# Patient Record
Sex: FEMALE | Race: ASIAN | NOT HISPANIC OR LATINO
[De-identification: names, ages, dates, MRNs, and addresses within clinical notes are randomized per-mention and may not be internally consistent; named-entity substitution may affect disease eponyms.]

---

## 2022-07-13 ENCOUNTER — APPOINTMENT (OUTPATIENT)
Dept: UROLOGY | Facility: CLINIC | Age: 57
End: 2022-07-13

## 2022-07-13 PROBLEM — Z00.00 ENCOUNTER FOR PREVENTIVE HEALTH EXAMINATION: Status: ACTIVE | Noted: 2022-07-13

## 2022-08-17 ENCOUNTER — APPOINTMENT (OUTPATIENT)
Dept: UROLOGY | Facility: CLINIC | Age: 57
End: 2022-08-17

## 2022-09-27 ENCOUNTER — INPATIENT (INPATIENT)
Facility: HOSPITAL | Age: 57
LOS: 9 days | Discharge: HOME | End: 2022-10-07
Attending: STUDENT IN AN ORGANIZED HEALTH CARE EDUCATION/TRAINING PROGRAM | Admitting: STUDENT IN AN ORGANIZED HEALTH CARE EDUCATION/TRAINING PROGRAM

## 2022-09-27 VITALS
RESPIRATION RATE: 15 BRPM | TEMPERATURE: 98 F | DIASTOLIC BLOOD PRESSURE: 154 MMHG | OXYGEN SATURATION: 100 % | SYSTOLIC BLOOD PRESSURE: 183 MMHG | HEART RATE: 67 BPM

## 2022-09-27 PROCEDURE — 93010 ELECTROCARDIOGRAM REPORT: CPT

## 2022-09-27 PROCEDURE — 99291 CRITICAL CARE FIRST HOUR: CPT | Mod: 25

## 2022-09-27 NOTE — ED PROVIDER NOTE - NSTIMEPROVIDERCAREINITIATE_GEN_ER
Instructions: This plan will send the code FBSE to the PM system.  DO NOT or CHANGE the price.
Detail Level: Simple
Price (Do Not Change): 0.00
27-Sep-2022 23:22

## 2022-09-27 NOTE — ED PROVIDER NOTE - PROGRESS NOTE DETAILS
Neptali PGY2: pt tolerating being off BIPAP, much more comfortable, talking full sentences. Off O2 altogether. Agreeable to admit.

## 2022-09-27 NOTE — ED ADULT TRIAGE NOTE - CHIEF COMPLAINT QUOTE
presents C/O SOB/ CP. No complaints of chest pain, headache, nausea, dizziness, vomiting fever, chills verbalized.. Pmhx HTN. anemia. DM

## 2022-09-27 NOTE — ED PROVIDER NOTE - CLINICAL SUMMARY MEDICAL DECISION MAKING FREE TEXT BOX
Neptali PGY2: 56yo F with PMH of HTN HLD DM2 presents to ED for eval of SOB with tachypnea and inc WOB with crackles in bases and HTN. Likely again in HTN emergency, no active CP. Plan for labs, BIPAP, EKG, CXR and plan for further tx based on results. Likely admit.

## 2022-09-27 NOTE — ED PROVIDER NOTE - NS ED ROS FT
Constitutional:  See HPI, +chills, afebrile  ENMT: No neck pain or stiffness  Cardiac:  No chest pain  Respiratory:  ++ respiratory distress. no cough  GI:  No nausea, vomiting, diarrhea or abdominal pain.  MS:  No back pain. no LE edema  Neuro:  No headache   Except as documented in the HPI,  all other systems are negative

## 2022-09-27 NOTE — ED PROVIDER NOTE - PHYSICAL EXAMINATION
CONSTITUTIONAL: mod respiratory distress   SKIN: Warm dry  HEAD: NCAT  EYES: NL inspection  ENT: dry oral mucosa  NECK: Supple; non tender.  CARD: RRR  RESP: b/l lower base crackles, tachypnea, inc WOB, accessory muscle use   ABD: S/NT no R/G  EXT: trace foot b/l pedal edema  NEURO: Grossly unremarkable  PSYCH: Cooperative, appropriate.

## 2022-09-27 NOTE — ED PROVIDER NOTE - CARE PLAN
Principal Discharge DX:	SOB (shortness of breath)   1 Principal Discharge DX:	Hypertensive emergency  Secondary Diagnosis:	SOB (shortness of breath)  Secondary Diagnosis:	ISAMAR (acute kidney injury)  Secondary Diagnosis:	Hyperkalemia

## 2022-09-27 NOTE — ED PROVIDER NOTE - ATTENDING CONTRIBUTION TO CARE
58 yo F with PMH HTN DM HLD Presenting with one day of progressive SOB that is now constant and severe.  Contrary to the triage note there is no CP reported.  Was recently seen and dc from [facility] for HTN emergency with some pleural effusions.  Symptoms had improved since dc but returned today.  Has also had some associated chills.      Vitals: I have reviewed the patients vital signs  General: nontoxic appearing severely SOB with two word sentences   HEENT: Atraumatic, normocephalic, airway patent  Eyes: EOMI, tracking appropriately  Neck: no tracheal deviation  Chest/Lungs: no trauma, symmetric chest rise, speaking in 2 word sentences,  clear resp distress with fine crackles in the bases  Heart: skin and extremities well perfused, regular rate and rhythm  Neuro: A+Ox3, appears non focal  MSK: strength at baseline in all extremities, no muscle wasting or atrophy  Skin: no cyanosis, no jaundice     58 yo with progressive and severe resp distress.  BP once again elevated.  Likely secondary to HTN emergency.  Is reporting compliance with her home meds.  Will place on BiPap for WOB and control BP.  Due to ravinder will attempt some clonidine first.  Will use parental agents if not responsive.  No signs of fluid overload.  Will look for possible infectious source.  As first time BiPap will likely need MICU consult.  Will need admission.  Also found to have some HyperK without any EKG changes.  Will treat with meds.  *The above represents an initial assessment/impression. Please refer to progress notes for potential changes in patient clinical course*    Upon my evaluation, this patient had a high probability of imminent or life-threatening deterioration due to HTN Emergency, hyperkalemia and SOB, which required my direct attention, intervention, and personal management.  The patient has a  medical condition that impairs one or more vital organ systems.  Frequent personal assessment and adjustment of medical interventions was performed.      I have personally provided 45 minutes of critical care time exclusive of time spent on separately billable procedures. Time includes review of laboratory data, radiology results, discussion with consultants, patient and family; monitoring for potential decompensation, as well as time spent retrieving data and reviewing the chart and documenting the visit. Interventions were performed as documented above.

## 2022-09-27 NOTE — ED PROVIDER NOTE - OBJECTIVE STATEMENT
58yo F with PMH of HTN HLD DM2 presents to ED for eval of SOB. Pt dc on 9/23 from [facility] for HTN emergency with pulm edema. Had CTA at that time which per papers was negative. DC on PO meds and had no sxs on dc then today suddenly developed inc SOB again to pt she was having difficulty breathing. Denying CP, cough, abd pain, NVDC. Does endorse sme chills but no fever. Endorses compliance with HTN meds but unclear if she is following.

## 2022-09-28 DIAGNOSIS — Z29.9 ENCOUNTER FOR PROPHYLACTIC MEASURES, UNSPECIFIED: ICD-10-CM

## 2022-09-28 DIAGNOSIS — E11.9 TYPE 2 DIABETES MELLITUS WITHOUT COMPLICATIONS: ICD-10-CM

## 2022-09-28 DIAGNOSIS — R74.01 ELEVATION OF LEVELS OF LIVER TRANSAMINASE LEVELS: ICD-10-CM

## 2022-09-28 DIAGNOSIS — E03.9 HYPOTHYROIDISM, UNSPECIFIED: ICD-10-CM

## 2022-09-28 DIAGNOSIS — E87.5 HYPERKALEMIA: ICD-10-CM

## 2022-09-28 DIAGNOSIS — N17.9 ACUTE KIDNEY FAILURE, UNSPECIFIED: ICD-10-CM

## 2022-09-28 DIAGNOSIS — R06.02 SHORTNESS OF BREATH: ICD-10-CM

## 2022-09-28 DIAGNOSIS — I16.1 HYPERTENSIVE EMERGENCY: ICD-10-CM

## 2022-09-28 DIAGNOSIS — I50.9 HEART FAILURE, UNSPECIFIED: ICD-10-CM

## 2022-09-28 DIAGNOSIS — D50.9 IRON DEFICIENCY ANEMIA, UNSPECIFIED: ICD-10-CM

## 2022-09-28 LAB
A1C WITH ESTIMATED AVERAGE GLUCOSE RESULT: 9.2 % — HIGH (ref 4–5.6)
ALBUMIN SERPL ELPH-MCNC: 4.3 G/DL — SIGNIFICANT CHANGE UP (ref 3.3–5)
ALBUMIN SERPL ELPH-MCNC: 4.3 G/DL — SIGNIFICANT CHANGE UP (ref 3.3–5)
ALDOST SERPL-MCNC: 5.2 NG/DL — SIGNIFICANT CHANGE UP
ALP SERPL-CCNC: 517 U/L — HIGH (ref 40–120)
ALP SERPL-CCNC: 580 U/L — HIGH (ref 40–120)
ALT FLD-CCNC: 275 U/L — HIGH (ref 4–33)
ALT FLD-CCNC: 326 U/L — HIGH (ref 4–33)
ANION GAP SERPL CALC-SCNC: 12 MMOL/L — SIGNIFICANT CHANGE UP (ref 7–14)
ANION GAP SERPL CALC-SCNC: 12 MMOL/L — SIGNIFICANT CHANGE UP (ref 7–14)
APPEARANCE UR: CLEAR — SIGNIFICANT CHANGE UP
AST SERPL-CCNC: 159 U/L — HIGH (ref 4–32)
AST SERPL-CCNC: 294 U/L — HIGH (ref 4–32)
B PERT DNA SPEC QL NAA+PROBE: SIGNIFICANT CHANGE UP
B PERT+PARAPERT DNA PNL SPEC NAA+PROBE: SIGNIFICANT CHANGE UP
BACTERIA # UR AUTO: NEGATIVE — SIGNIFICANT CHANGE UP
BASOPHILS # BLD AUTO: 0.03 K/UL — SIGNIFICANT CHANGE UP (ref 0–0.2)
BASOPHILS NFR BLD AUTO: 0.4 % — SIGNIFICANT CHANGE UP (ref 0–2)
BILIRUB SERPL-MCNC: 0.7 MG/DL — SIGNIFICANT CHANGE UP (ref 0.2–1.2)
BILIRUB SERPL-MCNC: 0.7 MG/DL — SIGNIFICANT CHANGE UP (ref 0.2–1.2)
BILIRUB UR-MCNC: NEGATIVE — SIGNIFICANT CHANGE UP
BLOOD GAS VENOUS COMPREHENSIVE RESULT: SIGNIFICANT CHANGE UP
BORDETELLA PARAPERTUSSIS (RAPRVP): SIGNIFICANT CHANGE UP
BUN SERPL-MCNC: 54 MG/DL — HIGH (ref 7–23)
BUN SERPL-MCNC: 59 MG/DL — HIGH (ref 7–23)
C PNEUM DNA SPEC QL NAA+PROBE: SIGNIFICANT CHANGE UP
CALCIUM SERPL-MCNC: 9.4 MG/DL — SIGNIFICANT CHANGE UP (ref 8.4–10.5)
CALCIUM SERPL-MCNC: 9.6 MG/DL — SIGNIFICANT CHANGE UP (ref 8.4–10.5)
CHLORIDE SERPL-SCNC: 103 MMOL/L — SIGNIFICANT CHANGE UP (ref 98–107)
CHLORIDE SERPL-SCNC: 106 MMOL/L — SIGNIFICANT CHANGE UP (ref 98–107)
CO2 SERPL-SCNC: 20 MMOL/L — LOW (ref 22–31)
CO2 SERPL-SCNC: 21 MMOL/L — LOW (ref 22–31)
COLOR SPEC: SIGNIFICANT CHANGE UP
CREAT ?TM UR-MCNC: 44 MG/DL — SIGNIFICANT CHANGE UP
CREAT SERPL-MCNC: 1.37 MG/DL — HIGH (ref 0.5–1.3)
CREAT SERPL-MCNC: 1.44 MG/DL — HIGH (ref 0.5–1.3)
DIFF PNL FLD: ABNORMAL
EGFR: 43 ML/MIN/1.73M2 — LOW
EGFR: 45 ML/MIN/1.73M2 — LOW
EOSINOPHIL # BLD AUTO: 0.15 K/UL — SIGNIFICANT CHANGE UP (ref 0–0.5)
EOSINOPHIL NFR BLD AUTO: 2 % — SIGNIFICANT CHANGE UP (ref 0–6)
EPI CELLS # UR: 1 /HPF — SIGNIFICANT CHANGE UP (ref 0–5)
ESTIMATED AVERAGE GLUCOSE: 217 — SIGNIFICANT CHANGE UP
FLUAV SUBTYP SPEC NAA+PROBE: SIGNIFICANT CHANGE UP
FLUBV RNA SPEC QL NAA+PROBE: SIGNIFICANT CHANGE UP
GLUCOSE BLDC GLUCOMTR-MCNC: 195 MG/DL — HIGH (ref 70–99)
GLUCOSE BLDC GLUCOMTR-MCNC: 205 MG/DL — HIGH (ref 70–99)
GLUCOSE BLDC GLUCOMTR-MCNC: 235 MG/DL — HIGH (ref 70–99)
GLUCOSE BLDC GLUCOMTR-MCNC: 243 MG/DL — HIGH (ref 70–99)
GLUCOSE SERPL-MCNC: 166 MG/DL — HIGH (ref 70–99)
GLUCOSE SERPL-MCNC: 370 MG/DL — HIGH (ref 70–99)
GLUCOSE UR QL: ABNORMAL
HADV DNA SPEC QL NAA+PROBE: SIGNIFICANT CHANGE UP
HCG SERPL-ACNC: <5 MIU/ML — SIGNIFICANT CHANGE UP
HCOV 229E RNA SPEC QL NAA+PROBE: SIGNIFICANT CHANGE UP
HCOV HKU1 RNA SPEC QL NAA+PROBE: SIGNIFICANT CHANGE UP
HCOV NL63 RNA SPEC QL NAA+PROBE: SIGNIFICANT CHANGE UP
HCOV OC43 RNA SPEC QL NAA+PROBE: SIGNIFICANT CHANGE UP
HCT VFR BLD CALC: 22.9 % — LOW (ref 34.5–45)
HCT VFR BLD CALC: 24.3 % — LOW (ref 34.5–45)
HGB BLD-MCNC: 7.2 G/DL — LOW (ref 11.5–15.5)
HGB BLD-MCNC: 7.8 G/DL — LOW (ref 11.5–15.5)
HMPV RNA SPEC QL NAA+PROBE: SIGNIFICANT CHANGE UP
HPIV1 RNA SPEC QL NAA+PROBE: SIGNIFICANT CHANGE UP
HPIV2 RNA SPEC QL NAA+PROBE: SIGNIFICANT CHANGE UP
HPIV3 RNA SPEC QL NAA+PROBE: SIGNIFICANT CHANGE UP
HPIV4 RNA SPEC QL NAA+PROBE: SIGNIFICANT CHANGE UP
HYALINE CASTS # UR AUTO: 1 /LPF — SIGNIFICANT CHANGE UP (ref 0–7)
IANC: 5.99 K/UL — SIGNIFICANT CHANGE UP (ref 1.8–7.4)
IMM GRANULOCYTES NFR BLD AUTO: 1.4 % — HIGH (ref 0–0.9)
KETONES UR-MCNC: NEGATIVE — SIGNIFICANT CHANGE UP
LEUKOCYTE ESTERASE UR-ACNC: NEGATIVE — SIGNIFICANT CHANGE UP
LYMPHOCYTES # BLD AUTO: 0.56 K/UL — LOW (ref 1–3.3)
LYMPHOCYTES # BLD AUTO: 7.3 % — LOW (ref 13–44)
M PNEUMO DNA SPEC QL NAA+PROBE: SIGNIFICANT CHANGE UP
MAGNESIUM SERPL-MCNC: 2.1 MG/DL — SIGNIFICANT CHANGE UP (ref 1.6–2.6)
MAGNESIUM SERPL-MCNC: 2.1 MG/DL — SIGNIFICANT CHANGE UP (ref 1.6–2.6)
MCHC RBC-ENTMCNC: 23.8 PG — LOW (ref 27–34)
MCHC RBC-ENTMCNC: 23.9 PG — LOW (ref 27–34)
MCHC RBC-ENTMCNC: 31.4 GM/DL — LOW (ref 32–36)
MCHC RBC-ENTMCNC: 32.1 GM/DL — SIGNIFICANT CHANGE UP (ref 32–36)
MCV RBC AUTO: 74.5 FL — LOW (ref 80–100)
MCV RBC AUTO: 75.6 FL — LOW (ref 80–100)
MONOCYTES # BLD AUTO: 0.82 K/UL — SIGNIFICANT CHANGE UP (ref 0–0.9)
MONOCYTES NFR BLD AUTO: 10.7 % — SIGNIFICANT CHANGE UP (ref 2–14)
NEUTROPHILS # BLD AUTO: 5.99 K/UL — SIGNIFICANT CHANGE UP (ref 1.8–7.4)
NEUTROPHILS NFR BLD AUTO: 78.2 % — HIGH (ref 43–77)
NITRITE UR-MCNC: NEGATIVE — SIGNIFICANT CHANGE UP
NRBC # BLD: 0 /100 WBCS — SIGNIFICANT CHANGE UP (ref 0–0)
NRBC # BLD: 0 /100 WBCS — SIGNIFICANT CHANGE UP (ref 0–0)
NRBC # FLD: 0 K/UL — SIGNIFICANT CHANGE UP (ref 0–0)
NRBC # FLD: 0.02 K/UL — HIGH (ref 0–0)
NT-PROBNP SERPL-SCNC: 6177 PG/ML — HIGH
PH UR: 6 — SIGNIFICANT CHANGE UP (ref 5–8)
PHOSPHATE SERPL-MCNC: 3.1 MG/DL — SIGNIFICANT CHANGE UP (ref 2.5–4.5)
PLATELET # BLD AUTO: 161 K/UL — SIGNIFICANT CHANGE UP (ref 150–400)
PLATELET # BLD AUTO: 162 K/UL — SIGNIFICANT CHANGE UP (ref 150–400)
POTASSIUM SERPL-MCNC: 4.5 MMOL/L — SIGNIFICANT CHANGE UP (ref 3.5–5.3)
POTASSIUM SERPL-MCNC: 5.8 MMOL/L — HIGH (ref 3.5–5.3)
POTASSIUM SERPL-SCNC: 4.5 MMOL/L — SIGNIFICANT CHANGE UP (ref 3.5–5.3)
POTASSIUM SERPL-SCNC: 5.8 MMOL/L — HIGH (ref 3.5–5.3)
PROT SERPL-MCNC: 7.1 G/DL — SIGNIFICANT CHANGE UP (ref 6–8.3)
PROT SERPL-MCNC: 7.1 G/DL — SIGNIFICANT CHANGE UP (ref 6–8.3)
PROT UR-MCNC: ABNORMAL
RAPID RVP RESULT: SIGNIFICANT CHANGE UP
RBC # BLD: 3.03 M/UL — LOW (ref 3.8–5.2)
RBC # BLD: 3.26 M/UL — LOW (ref 3.8–5.2)
RBC # FLD: 15.4 % — HIGH (ref 10.3–14.5)
RBC # FLD: 15.7 % — HIGH (ref 10.3–14.5)
RBC CASTS # UR COMP ASSIST: 4 /HPF — SIGNIFICANT CHANGE UP (ref 0–4)
RSV RNA SPEC QL NAA+PROBE: SIGNIFICANT CHANGE UP
RV+EV RNA SPEC QL NAA+PROBE: SIGNIFICANT CHANGE UP
SARS-COV-2 RNA SPEC QL NAA+PROBE: SIGNIFICANT CHANGE UP
SODIUM SERPL-SCNC: 135 MMOL/L — SIGNIFICANT CHANGE UP (ref 135–145)
SODIUM SERPL-SCNC: 139 MMOL/L — SIGNIFICANT CHANGE UP (ref 135–145)
SODIUM UR-SCNC: 39 MMOL/L — SIGNIFICANT CHANGE UP
SP GR SPEC: 1.01 — SIGNIFICANT CHANGE UP (ref 1.01–1.05)
TROPONIN T, HIGH SENSITIVITY RESULT: 26 NG/L — SIGNIFICANT CHANGE UP
TROPONIN T, HIGH SENSITIVITY RESULT: 26 NG/L — SIGNIFICANT CHANGE UP
TSH SERPL-MCNC: 8.63 UIU/ML — HIGH (ref 0.27–4.2)
UROBILINOGEN FLD QL: SIGNIFICANT CHANGE UP
WBC # BLD: 5.5 K/UL — SIGNIFICANT CHANGE UP (ref 3.8–10.5)
WBC # BLD: 7.66 K/UL — SIGNIFICANT CHANGE UP (ref 3.8–10.5)
WBC # FLD AUTO: 5.5 K/UL — SIGNIFICANT CHANGE UP (ref 3.8–10.5)
WBC # FLD AUTO: 7.66 K/UL — SIGNIFICANT CHANGE UP (ref 3.8–10.5)
WBC UR QL: 1 /HPF — SIGNIFICANT CHANGE UP (ref 0–5)

## 2022-09-28 PROCEDURE — 99223 1ST HOSP IP/OBS HIGH 75: CPT

## 2022-09-28 PROCEDURE — 71045 X-RAY EXAM CHEST 1 VIEW: CPT | Mod: 26

## 2022-09-28 PROCEDURE — 93306 TTE W/DOPPLER COMPLETE: CPT | Mod: 26

## 2022-09-28 PROCEDURE — 76700 US EXAM ABDOM COMPLETE: CPT | Mod: 26

## 2022-09-28 PROCEDURE — 99223 1ST HOSP IP/OBS HIGH 75: CPT | Mod: GC

## 2022-09-28 RX ORDER — AMLODIPINE BESYLATE 2.5 MG/1
10 TABLET ORAL DAILY
Refills: 0 | Status: DISCONTINUED | OUTPATIENT
Start: 2022-09-28 | End: 2022-09-28

## 2022-09-28 RX ORDER — INSULIN GLARGINE 100 [IU]/ML
13 INJECTION, SOLUTION SUBCUTANEOUS AT BEDTIME
Refills: 0 | Status: DISCONTINUED | OUTPATIENT
Start: 2022-09-28 | End: 2022-09-28

## 2022-09-28 RX ORDER — DEXTROSE 50 % IN WATER 50 %
25 SYRINGE (ML) INTRAVENOUS ONCE
Refills: 0 | Status: DISCONTINUED | OUTPATIENT
Start: 2022-09-28 | End: 2022-10-07

## 2022-09-28 RX ORDER — DEXTROSE 50 % IN WATER 50 %
15 SYRINGE (ML) INTRAVENOUS ONCE
Refills: 0 | Status: DISCONTINUED | OUTPATIENT
Start: 2022-09-28 | End: 2022-10-07

## 2022-09-28 RX ORDER — LABETALOL HCL 100 MG
50 TABLET ORAL
Refills: 0 | Status: DISCONTINUED | OUTPATIENT
Start: 2022-09-28 | End: 2022-09-28

## 2022-09-28 RX ORDER — PANTOPRAZOLE SODIUM 20 MG/1
40 TABLET, DELAYED RELEASE ORAL
Refills: 0 | Status: DISCONTINUED | OUTPATIENT
Start: 2022-09-28 | End: 2022-10-07

## 2022-09-28 RX ORDER — GLUCAGON INJECTION, SOLUTION 0.5 MG/.1ML
1 INJECTION, SOLUTION SUBCUTANEOUS ONCE
Refills: 0 | Status: DISCONTINUED | OUTPATIENT
Start: 2022-09-28 | End: 2022-10-07

## 2022-09-28 RX ORDER — HYDRALAZINE HCL 50 MG
100 TABLET ORAL THREE TIMES A DAY
Refills: 0 | Status: DISCONTINUED | OUTPATIENT
Start: 2022-09-28 | End: 2022-09-29

## 2022-09-28 RX ORDER — HEPARIN SODIUM 5000 [USP'U]/ML
5000 INJECTION INTRAVENOUS; SUBCUTANEOUS EVERY 12 HOURS
Refills: 0 | Status: DISCONTINUED | OUTPATIENT
Start: 2022-09-28 | End: 2022-10-07

## 2022-09-28 RX ORDER — INSULIN GLARGINE 100 [IU]/ML
7 INJECTION, SOLUTION SUBCUTANEOUS ONCE
Refills: 0 | Status: DISCONTINUED | OUTPATIENT
Start: 2022-09-28 | End: 2022-09-28

## 2022-09-28 RX ORDER — INSULIN LISPRO 100/ML
VIAL (ML) SUBCUTANEOUS
Refills: 0 | Status: DISCONTINUED | OUTPATIENT
Start: 2022-09-28 | End: 2022-10-07

## 2022-09-28 RX ORDER — AMLODIPINE BESYLATE 2.5 MG/1
10 TABLET ORAL DAILY
Refills: 0 | Status: DISCONTINUED | OUTPATIENT
Start: 2022-09-28 | End: 2022-09-29

## 2022-09-28 RX ORDER — LEVOTHYROXINE SODIUM 125 MCG
50 TABLET ORAL DAILY
Refills: 0 | Status: DISCONTINUED | OUTPATIENT
Start: 2022-09-28 | End: 2022-10-07

## 2022-09-28 RX ORDER — SODIUM CHLORIDE 9 MG/ML
1000 INJECTION, SOLUTION INTRAVENOUS
Refills: 0 | Status: DISCONTINUED | OUTPATIENT
Start: 2022-09-28 | End: 2022-10-07

## 2022-09-28 RX ORDER — INSULIN GLARGINE 100 [IU]/ML
16 INJECTION, SOLUTION SUBCUTANEOUS EVERY MORNING
Refills: 0 | Status: DISCONTINUED | OUTPATIENT
Start: 2022-09-28 | End: 2022-09-29

## 2022-09-28 RX ORDER — INSULIN HUMAN 100 [IU]/ML
5 INJECTION, SOLUTION SUBCUTANEOUS ONCE
Refills: 0 | Status: COMPLETED | OUTPATIENT
Start: 2022-09-28 | End: 2022-09-28

## 2022-09-28 RX ORDER — FUROSEMIDE 40 MG
20 TABLET ORAL ONCE
Refills: 0 | Status: COMPLETED | OUTPATIENT
Start: 2022-09-28 | End: 2022-09-28

## 2022-09-28 RX ORDER — HYDRALAZINE HCL 50 MG
50 TABLET ORAL THREE TIMES A DAY
Refills: 0 | Status: DISCONTINUED | OUTPATIENT
Start: 2022-09-28 | End: 2022-09-28

## 2022-09-28 RX ORDER — INSULIN LISPRO 100/ML
VIAL (ML) SUBCUTANEOUS AT BEDTIME
Refills: 0 | Status: DISCONTINUED | OUTPATIENT
Start: 2022-09-28 | End: 2022-10-07

## 2022-09-28 RX ORDER — DEXTROSE 50 % IN WATER 50 %
12.5 SYRINGE (ML) INTRAVENOUS ONCE
Refills: 0 | Status: DISCONTINUED | OUTPATIENT
Start: 2022-09-28 | End: 2022-10-07

## 2022-09-28 RX ORDER — HYDRALAZINE HCL 50 MG
50 TABLET ORAL ONCE
Refills: 0 | Status: DISCONTINUED | OUTPATIENT
Start: 2022-09-28 | End: 2022-09-28

## 2022-09-28 RX ORDER — ATORVASTATIN CALCIUM 80 MG/1
40 TABLET, FILM COATED ORAL AT BEDTIME
Refills: 0 | Status: DISCONTINUED | OUTPATIENT
Start: 2022-09-28 | End: 2022-10-07

## 2022-09-28 RX ORDER — FUROSEMIDE 40 MG
20 TABLET ORAL ONCE
Refills: 0 | Status: DISCONTINUED | OUTPATIENT
Start: 2022-09-28 | End: 2022-09-28

## 2022-09-28 RX ORDER — LABETALOL HCL 100 MG
50 TABLET ORAL
Refills: 0 | Status: DISCONTINUED | OUTPATIENT
Start: 2022-09-28 | End: 2022-09-29

## 2022-09-28 RX ADMIN — Medication 50 MILLIGRAM(S): at 21:34

## 2022-09-28 RX ADMIN — ATORVASTATIN CALCIUM 40 MILLIGRAM(S): 80 TABLET, FILM COATED ORAL at 22:14

## 2022-09-28 RX ADMIN — Medication 50 MICROGRAM(S): at 06:24

## 2022-09-28 RX ADMIN — Medication 20 MILLIGRAM(S): at 06:49

## 2022-09-28 RX ADMIN — HEPARIN SODIUM 5000 UNIT(S): 5000 INJECTION INTRAVENOUS; SUBCUTANEOUS at 18:12

## 2022-09-28 RX ADMIN — INSULIN HUMAN 5 UNIT(S): 100 INJECTION, SOLUTION SUBCUTANEOUS at 01:59

## 2022-09-28 RX ADMIN — Medication 100 MILLIGRAM(S): at 06:49

## 2022-09-28 RX ADMIN — Medication 2: at 12:41

## 2022-09-28 RX ADMIN — AMLODIPINE BESYLATE 10 MILLIGRAM(S): 2.5 TABLET ORAL at 07:19

## 2022-09-28 RX ADMIN — PANTOPRAZOLE SODIUM 40 MILLIGRAM(S): 20 TABLET, DELAYED RELEASE ORAL at 06:24

## 2022-09-28 RX ADMIN — Medication 0: at 21:35

## 2022-09-28 RX ADMIN — Medication 2: at 18:10

## 2022-09-28 RX ADMIN — Medication 0.2 MILLIGRAM(S): at 18:12

## 2022-09-28 RX ADMIN — Medication 100 MILLIGRAM(S): at 18:12

## 2022-09-28 RX ADMIN — INSULIN GLARGINE 16 UNIT(S): 100 INJECTION, SOLUTION SUBCUTANEOUS at 12:40

## 2022-09-28 RX ADMIN — HEPARIN SODIUM 5000 UNIT(S): 5000 INJECTION INTRAVENOUS; SUBCUTANEOUS at 06:24

## 2022-09-28 RX ADMIN — Medication 20 MILLIGRAM(S): at 13:30

## 2022-09-28 RX ADMIN — Medication 100 MILLIGRAM(S): at 21:34

## 2022-09-28 RX ADMIN — Medication 0.2 MILLIGRAM(S): at 02:32

## 2022-09-28 NOTE — PROGRESS NOTE ADULT - ASSESSMENT
58yo F with PMH of HTN, HLD, DM2, anemia presenting w resistant hypertension resulting in SOB c/f flash pulm edema  58yo F with PMH of HTN, HLD, DM2, anemia presenting w resistant hypertension resulting in SOB c/f flash pulm edema. Echo remarkable for elevated right-sided pressures consistent with severe pulmonary hypertension of unclear etiology. Cards c/s pending. Cont. with home BP meds, s/p IV lasix 20mg x doses for diureses. Dispo pending medical improvement.

## 2022-09-28 NOTE — H&P ADULT - PROBLEM SELECTOR PLAN 6
Anemia unchanged from prev hospitalization  Pt denies acute bleed  - F/up iron studies  - c/w ferrous sulfate  - outpt cancer screening per guidelines Unknown etx, potentially from htn emergency   - s/p 5 units insulin  - ekg if any chest pain/worsening hyperkalemia   - CTM bmp

## 2022-09-28 NOTE — H&P ADULT - ASSESSMENT
approved   56yo F with PMH of HTN, HLD, DM2, anemia presenting w resistant hypertension resulting in SOB c/f flash pulm edema

## 2022-09-28 NOTE — H&P ADULT - PROBLEM SELECTOR PLAN 1
- s/p clonidine + hydral  - goal to lower by 25% in 24 hours? - s/p clonidine + hydral  - goal to lower by 20-25% within 1st hour  - goal 160 systolic within 2-6 hrs  - lower to normal over 24 to 48 horus s/p clonidine + hydral  BP lowering goal:  - Lower by 20-25% within 1st hour  - goal 160 systolic within 2-6 hrs  - lower to normal over 24 to 48 horus  c/w home meds w/ hold parameters  - F/up renin/aldos  - f/up echo   - F/up tsh  - Consider renal artery duplex for ana paula down the line s/p clonidine + hydral  BP lowering goal:  - Lower by 20-25% within 1st hour  - goal 160 systolic within 2-6 hrs  - lower to normal over 24 to 48 horus  c/w home meds w/ hold parameters: amlodipine 10mg qd, labetalol 50mg BID, clonidine 0.2mg BID, hydral 50 --> increase to 100mg TID  - F/up renin/aldos  - f/up echo   - F/up tsh  - Consider renal artery duplex for ana paula down the line

## 2022-09-28 NOTE — H&P ADULT - ATTENDING COMMENTS
57F with PMH of HTN, HLD, DM2, hypothyroidism, anemia presenting with respiratory distress 2/2 HTN emergency with ADHF    #Hypertensive emergency  Previously requiring BIPAP and now off. Missed clonidine dose at home today, otherwise compliant with meds. Possible flash pulm edema  -c/w amlodipine 10mg qd, clonidine 0.2mg BID, labetalol 50mg BID  -increase hydral to 100mg TID  -will give 20 IV lasix now stat and reassess PRN  -echo  -trops flat  -EKG nonischemic. No CP/SOB at this time  -renin/reji    #ADHF  Mild pulm edema but likely flash pulm edema in setting of HTN emergency. Probnp elevated  Also per dc summary from [facility] has had mild-mod pericardial effusion and mild-mod pleural effusions  -echo  -lasix 20 IV now  -HF c/s in AM  -BP control as above    #ISAMAR  Creatinine elevated at OSH as well. May be from HTN  -US kidney and bladder  -urine lytes    #Transaminitis  RUQ sono    #DM2  Takes levemir 20u qhs depending on FSGs at home  -hold metformin and repaglinide inpatient  -lantus 7u stat now and 13u tonight. Uptitrate as needed    #HLD  Resume statin    #Hypothyroidism  Resume levothyroxine    #Anemia  No active bleeding  Iron studies  f/u PMD  Cancer screenings    PPx  HSQ  DASH/TLC CC fluid restriction

## 2022-09-28 NOTE — PROGRESS NOTE ADULT - PROBLEM SELECTOR PLAN 2
Likely cause of SOB, probnp >6k  c/w BIPAP O/N  s/p lasix 20, assess for prn meed   f/up echo  BP control as above  HF consult in AM  Strict I's + Os  mg >2, K >4 On admission, probnp >6k. On exam, clinical symptoms of right sided heart failure evident. Echo: EF: 64%, elevated right sided pressures consistent with severe pulmonary hypertension.  - d/c BIPAP O/N  - s/p IV lasix 20 x 2 doses, reassess for symptoms and evaluate if patient requires prn   - Cards c/s pending   - Strict I's and O's  - Goal of Mg >2, K >4

## 2022-09-28 NOTE — CONSULT NOTE ADULT - SUBJECTIVE AND OBJECTIVE BOX
CARDIOLOGY FELLOW CONSULT NOTE    HPI:  58yo F with PMH of HTN, HLD, DM2, anemia presents to ED for progressive and severe resp distress.    recently discharged on 9/23 from [facility] after one week for HTN emergency with pulm edema and chest pain. Her hospital course was complicated by ISAMAR and hyponatremia (resolving toward dc w/ bp control), hyperkalemia w/ peaked T waves on EKG (requiring calc gluconate, insulin, lokelma) and hyperglycemic to 400s.  She had CTA at that time which per papers was negative for PE but showed pulmonary edema w/ b/l pleural effusions, cardiomegaly w/ RH dysfunction and small to mod pericardial effusion.    On discharge she was initiated on norvasc 10, hydral 50 TID, told to continue her clonidine 0.2 BID, change labetalol to 100 mg 0.5 BID and stop taking hydrochlorothiazide. Per daughter pt has had multiple episodes of hypertensive emergency starting in march of this year for which she was hospitalized >3x. Today her breathing was abnormal, and she was lying down more than usual. She has worsening SOB and difficulty breathing, her  brought her to the ED. Per daughter she saw blood on the pts mask at some point however pt denies hematemsis/hemoptysis. Pt denies CP, cough, abd pain, NVDC, fevers.     Vitals on arrival 183/124, 67 bpm, 98F, 100% RA. In the ED pt recieved 5 units insulin for hyperK of 5.8, 0.2 clonidine, 50 hydral. Most recent BP is in the 190s/100s (28 Sep 2022 04:44)    Interval Events:  3L NC, BP 130s/60s-70s, HR 60s-80s  hs trop flat at 26 x 2, BNP 6177    PMHx:   Benign essential HTN    HTN (hypertension)    HLD (hyperlipidemia)    DM (diabetes mellitus)        PSHx:   No significant past surgical history        Allergies:  No Known Allergies      Home Meds:    Current Medications:   amLODIPine   Tablet 10 milliGRAM(s) Oral daily  atorvastatin 40 milliGRAM(s) Oral at bedtime  cloNIDine 0.2 milliGRAM(s) Oral two times a day  dextrose 5%. 1000 milliLiter(s) IV Continuous <Continuous>  dextrose 5%. 1000 milliLiter(s) IV Continuous <Continuous>  dextrose 50% Injectable 25 Gram(s) IV Push once  dextrose 50% Injectable 12.5 Gram(s) IV Push once  dextrose 50% Injectable 25 Gram(s) IV Push once  dextrose Oral Gel 15 Gram(s) Oral once PRN  glucagon  Injectable 1 milliGRAM(s) IntraMuscular once  heparin   Injectable 5000 Unit(s) SubCutaneous every 12 hours  hydrALAZINE 100 milliGRAM(s) Oral three times a day  insulin glargine Injectable (LANTUS) 16 Unit(s) SubCutaneous every morning  insulin lispro (ADMELOG) corrective regimen sliding scale   SubCutaneous three times a day before meals  insulin lispro (ADMELOG) corrective regimen sliding scale   SubCutaneous at bedtime  labetalol 50 milliGRAM(s) Oral two times a day  levothyroxine 50 MICROGram(s) Oral daily  pantoprazole    Tablet 40 milliGRAM(s) Oral before breakfast      FAMILY HISTORY:  No pertinent family history in first degree relatives        Social History:  Smoking History:  Alcohol Use:  Drug Use:    REVIEW OF SYSTEMS:  14pt ROS neg unless stated above    Physical Exam:  T(F): 97.1 (09-28), Max: 98 (09-27)  HR: 88 (09-28) (55 - 88)  BP: 135/78 (09-28) (130/60 - 186/144)  RR: 18 (09-28)  SpO2: 100% (09-28)  GENERAL: No acute distress, well-developed  HEAD:  Atraumatic, Normocephalic  ENT: EOMI, PERRLA, conjunctiva and sclera clear, Neck supple, No JVD, moist mucosa  CHEST/LUNG: Clear to auscultation bilaterally; No wheeze, equal breath sounds bilaterally   BACK: No spinal tenderness  HEART: Regular rate and rhythm; No murmurs, rubs, or gallops  ABDOMEN: Soft, Nontender, Nondistended; Bowel sounds present  EXTREMITIES:  No clubbing, cyanosis, or edema  PSYCH: Nl behavior, nl affect  NEUROLOGY: AAOx3, non-focal, cranial nerves intact  SKIN: Normal color, No rashes or lesions  LINES:    ECG:     Echo: DIMENSIONS:  Dimensions:     Normal Values:  LA:     3.5 cm    2.0 - 4.0 cm  Ao:     2.5 cm    2.0 - 3.8 cm  SEPTUM: 0.7 cm    0.6 - 1.2 cm  PWT:    0.8 cm    0.6 - 1.1 cm  LVIDd:  4.6 cm    3.0 - 5.6 cm  LVIDs:  3.0 cm    1.8 - 4.0 cm  Derived Variables:  LVMI: 68 g/m2  RWT: 0.34  Fractional short: 35 %  Ejection Fraction (Modified Macedo Rule): 64 %  ------------------------------------------------------------------------  OBSERVATIONS:  Mitral Valve: Normal mitral valve. Mild mitral  regurgitation.  Aortic Root: Normal aortic root.  Aortic Valve: Normal trileaflet aortic valve.  Left Atrium: Mildly dilated left atrium.  LA volume index =  36 cc/m2.  Left Ventricle: Normal left ventricular systolic function.  No segmental wall motion abnormalities. Normal left  ventricular internal dimensions and wall thicknesses.  Right Heart: Normal right atrium. The right ventricle is  not well visualized; grossly normal right ventricular  systolic function. Normal tricuspid valve.  Mild-moderate  tricuspid regurgitation. Normal pulmonic valve. Mild  pulmonic regurgitation.  Pericardium/PleuraSmall pericardial effusion posterior to  the left ventricle.  Hemodynamic: Estimated right ventricular systolic pressure  equals 68 mm Hg, assuming right atrial pressure equals 10  mm Hg, consistent with severe pulmonary hypertension.  ------------------------------------------------------------------------  CONCLUSIONS:  1. Normal mitral valve. Mild mitral regurgitation.  2. Mildly dilated left atrium.  LA volume index = 36 cc/m2.  3. Normal left ventricular internal dimensions and wall  thicknesses.  4. Normal left ventricular systolic function. No segmental  wall motion abnormalities.  5. The right ventricle is not well visualized; grossly  normal right ventricular systolic function.  6. Estimated right ventricular systolic pressure equals 68  mm Hg, assuming right atrial pressure equals 10 mm Hg,  consistent with severe pulmonary hypertension.    CXR:  bilateral pleural effusions, congestion centrally  Labs: Personally reviewed                        7.2    5.50  )-----------( 161      ( 28 Sep 2022 07:47 )             22.9     09-28    139  |  106  |  54<H>  ----------------------------<  166<H>  4.5   |  21<L>  |  1.37<H>    Ca    9.6      28 Sep 2022 07:47  Phos  3.1     09-28  Mg     2.10     09-28    TPro  7.1  /  Alb  4.3  /  TBili  0.7  /  DBili  x   /  AST  159<H>  /  ALT  275<H>  /  AlkPhos  517<H>  09-28        CARDIAC MARKERS ( 28 Sep 2022 01:57 )  26 ng/L / x     / x     / x     / x     / x      CARDIAC MARKERS ( 27 Sep 2022 23:50 )  26 ng/L / x     / x     / x     / x     / x            Serum Pro-Brain Natriuretic Peptide: 6177 pg/mL (09-27 @ 23:50)        Thyroid Stimulating Hormone, Serum: 8.63 uIU/mL (09-28 @ 07:47) CARDIOLOGY FELLOW CONSULT NOTE    HPI:  56yo F with PMH of HTN, HLD, DM2, anemia presents to ED for progressive and severe resp distress.    recently discharged on 9/23 from [facility] after one week for HTN emergency with pulm edema and chest pain. Her hospital course was complicated by ISAMAR and hyponatremia (resolving toward dc w/ bp control), hyperkalemia w/ peaked T waves on EKG (requiring calc gluconate, insulin, lokelma) and hyperglycemic to 400s.  She had CTA at that time which per papers was negative for PE but showed pulmonary edema w/ b/l pleural effusions, cardiomegaly w/ RH dysfunction and small to mod pericardial effusion.    On discharge she was initiated on norvasc 10, hydral 50 TID, told to continue her clonidine 0.2 BID, change labetalol to 100 mg 0.5 BID and stop taking hydrochlorothiazide. Per daughter pt has had multiple episodes of hypertensive emergency starting in march of this year for which she was hospitalized >3x. Today her breathing was abnormal, and she was lying down more than usual. She has worsening SOB and difficulty breathing, her  brought her to the ED. Per daughter she saw blood on the pts mask at some point however pt denies hematemsis/hemoptysis. Pt denies CP, cough, abd pain, NVDC, fevers.     Vitals on arrival 183/124, 67 bpm, 98F, 100% RA. In the ED pt recieved 5 units insulin for hyperK of 5.8, 0.2 clonidine, 50 hydral. Most recent BP is in the 190s/100s (28 Sep 2022 04:44)    Interval Events:  3L NC, BP 130s/60s-70s, HR 60s-80s  hs trop flat at 26 x 2, BNP 6177    PMHx:   Benign essential HTN    HTN (hypertension)    HLD (hyperlipidemia)    DM (diabetes mellitus)        PSHx:   No significant past surgical history        Allergies:  No Known Allergies      Home Meds:    Current Medications:   amLODIPine   Tablet 10 milliGRAM(s) Oral daily  atorvastatin 40 milliGRAM(s) Oral at bedtime  cloNIDine 0.2 milliGRAM(s) Oral two times a day  dextrose 5%. 1000 milliLiter(s) IV Continuous <Continuous>  dextrose 5%. 1000 milliLiter(s) IV Continuous <Continuous>  dextrose 50% Injectable 25 Gram(s) IV Push once  dextrose 50% Injectable 12.5 Gram(s) IV Push once  dextrose 50% Injectable 25 Gram(s) IV Push once  dextrose Oral Gel 15 Gram(s) Oral once PRN  glucagon  Injectable 1 milliGRAM(s) IntraMuscular once  heparin   Injectable 5000 Unit(s) SubCutaneous every 12 hours  hydrALAZINE 100 milliGRAM(s) Oral three times a day  insulin glargine Injectable (LANTUS) 16 Unit(s) SubCutaneous every morning  insulin lispro (ADMELOG) corrective regimen sliding scale   SubCutaneous three times a day before meals  insulin lispro (ADMELOG) corrective regimen sliding scale   SubCutaneous at bedtime  labetalol 50 milliGRAM(s) Oral two times a day  levothyroxine 50 MICROGram(s) Oral daily  pantoprazole    Tablet 40 milliGRAM(s) Oral before breakfast      FAMILY HISTORY:  No pertinent family history in first degree relatives        Social History:  Smoking History:  Alcohol Use:  Drug Use:    REVIEW OF SYSTEMS:  14pt ROS neg unless stated above    Physical Exam:  T(F): 97.1 (09-28), Max: 98 (09-27)  HR: 88 (09-28) (55 - 88)  BP: 135/78 (09-28) (130/60 - 186/144)  RR: 18 (09-28)  SpO2: 100% (09-28)  GENERAL: No acute distress, well-developed  HEAD:  Atraumatic, Normocephalic  ENT: +JVD to angle of jaw   CHEST/LUNG: crackles at the bases bilaterally   BACK: No spinal tenderness  HEART: +MENDEL  ABDOMEN: Soft, Nontender, Nondistended; Bowel sounds present  EXTREMITIES: pitting edema to lower extremities   PSYCH: Nl behavior, nl affect  NEUROLOGY: AAOx3, non-focal, cranial nerves intact  SKIN: Normal color, No rashes or lesions  LINES:    Echo: DIMENSIONS:  Dimensions:     Normal Values:  LA:     3.5 cm    2.0 - 4.0 cm  Ao:     2.5 cm    2.0 - 3.8 cm  SEPTUM: 0.7 cm    0.6 - 1.2 cm  PWT:    0.8 cm    0.6 - 1.1 cm  LVIDd:  4.6 cm    3.0 - 5.6 cm  LVIDs:  3.0 cm    1.8 - 4.0 cm  Derived Variables:  LVMI: 68 g/m2  RWT: 0.34  Fractional short: 35 %  Ejection Fraction (Modified Macedo Rule): 64 %  ------------------------------------------------------------------------  OBSERVATIONS:  Mitral Valve: Normal mitral valve. Mild mitral  regurgitation.  Aortic Root: Normal aortic root.  Aortic Valve: Normal trileaflet aortic valve.  Left Atrium: Mildly dilated left atrium.  LA volume index =  36 cc/m2.  Left Ventricle: Normal left ventricular systolic function.  No segmental wall motion abnormalities. Normal left  ventricular internal dimensions and wall thicknesses.  Right Heart: Normal right atrium. The right ventricle is  not well visualized; grossly normal right ventricular  systolic function. Normal tricuspid valve.  Mild-moderate  tricuspid regurgitation. Normal pulmonic valve. Mild  pulmonic regurgitation.  Pericardium/PleuraSmall pericardial effusion posterior to  the left ventricle.  Hemodynamic: Estimated right ventricular systolic pressure  equals 68 mm Hg, assuming right atrial pressure equals 10  mm Hg, consistent with severe pulmonary hypertension.  ------------------------------------------------------------------------  CONCLUSIONS:  1. Normal mitral valve. Mild mitral regurgitation.  2. Mildly dilated left atrium.  LA volume index = 36 cc/m2.  3. Normal left ventricular internal dimensions and wall  thicknesses.  4. Normal left ventricular systolic function. No segmental  wall motion abnormalities.  5. The right ventricle is not well visualized; grossly  normal right ventricular systolic function.  6. Estimated right ventricular systolic pressure equals 68  mm Hg, assuming right atrial pressure equals 10 mm Hg,  consistent with severe pulmonary hypertension.    CXR:  bilateral pleural effusions, congestion centrally  Labs: Personally reviewed                        7.2    5.50  )-----------( 161      ( 28 Sep 2022 07:47 )             22.9     09-28    139  |  106  |  54<H>  ----------------------------<  166<H>  4.5   |  21<L>  |  1.37<H>    Ca    9.6      28 Sep 2022 07:47  Phos  3.1     09-28  Mg     2.10     09-28    TPro  7.1  /  Alb  4.3  /  TBili  0.7  /  DBili  x   /  AST  159<H>  /  ALT  275<H>  /  AlkPhos  517<H>  09-28        CARDIAC MARKERS ( 28 Sep 2022 01:57 )  26 ng/L / x     / x     / x     / x     / x      CARDIAC MARKERS ( 27 Sep 2022 23:50 )  26 ng/L / x     / x     / x     / x     / x            Serum Pro-Brain Natriuretic Peptide: 6177 pg/mL (09-27 @ 23:50)        Thyroid Stimulating Hormone, Serum: 8.63 uIU/mL (09-28 @ 07:47)

## 2022-09-28 NOTE — PROGRESS NOTE ADULT - ATTENDING COMMENTS
57 y.o. F w/ a hx of HTN, DM2, hypothyroidism hospitalized for ADHF 2/2 HTN emergency.     Patient reports she was hospitalized at [facility] for 1 week for HTN, on discharge, BP was "normal". However, since discharge, patient has been feeling chills, took Tylenol. notes her BP was less than 180 before presenting.  On Monday, Tuesday, felt dyspneic which worsened-  BP was rising to 180's prompting her family to bring her to the ED. PE notable for clear lungs, elevated JVP, 3+ LE edema to knees (improved per pt). Labs notable for Cr 1.37 from 1.44; , AST//275. TSH 8.63, A1C 9.2.     # HTN emergency c/b ADHF: Wean O2 as able. Cont diuresis. Check TTE. BP markedly improved with just home medications, unclear why she became uncontrolled, pt reports compliance but did miss a dose of clonidine and labetalol on Tuesday- however SOB started Monday. Perform secondary HTN workup- check renal dopplers, renin/reji, screen for OLIVE. Will obtain records from [facility].   # ISAMAR: Unknown baseline, will obtain records from [facility]. Check renal Doppler  # Transaminitis: Suspect hepatic congestion, improving with diuresis, continue.   # DM2: Missed home dose of Lantus last night. Did not get AM Lantus 7. Give 16u Lantus now + SSI, up-titrate as necessary. A1C 9.2, will check [facility] records  # Hypothyroidism: TSH elevated 8.63- will need to obtain collateral if this is an improvement from OP   # Microcytic anemia: Check iron studies. Patient reports she may have had a colonoscopy at [facility]? F/u records.

## 2022-09-28 NOTE — PATIENT PROFILE ADULT - FALL HARM RISK - UNIVERSAL INTERVENTIONS
Bed in lowest position, wheels locked, appropriate side rails in place/Call bell, personal items and telephone in reach/Instruct patient to call for assistance before getting out of bed or chair/Non-slip footwear when patient is out of bed/[place] to call system/Physically safe environment - no spills, clutter or unnecessary equipment/Purposeful Proactive Rounding/Room/bathroom lighting operational, light cord in reach

## 2022-09-28 NOTE — CONSULT NOTE ADULT - ASSESSMENT
56yo F with PMH of HTN, HLD, DM2, anemia presents to ED for progressive and severe resp distress, with hypoxemic respiratory failure and symptoms of heart failure exacerbation.  Cardiology has been consulted for management of htn in lieu of multiple hypertensive crises; she had been discharged on multiple medications (hydral, clonidine); while controlled currently, they run risk of rebound hypertension. Given her successive hospitalizations, it would also be reasonable to work up for renovascular hypertension, pheochromocytoma, primary reji, etc.  We were also consulted as it relates to finding of pulmonary artery hypertension suggestion on TTE. While TTE doesn't show LV systolic dysfunction, it does have an enlarged LA indicating chronicity of structural loading, and of some component of diastolic dysfunction. At present time she is volume overloaded, with oxygen requirement, and likely has a significant component of Group II (cardiac) pulm artery hypertension. Expect this to improve after approaching euvolemia, and this test should be repeated to re-assess.    Plan:  - Continue diuresis, goal net neg 1-2L   - Cre unclear if ISAMAR (renocongestion?) vs. CKD. For now can continue labetalol, hydral/isordil for afterload control. Prioritize weaning off clonidine   - Microcytic anemia. F/u iron studies. if ferritin < 100 or iron sat < 20%, would give IV iron x 5 days this hospitalization  - Renal US with arterial duplex   - f/u measurements of urinary and plasma fractionated metanephrines   - f/u renin-reji  56yo F with PMH of HTN, HLD, DM2, anemia presents to ED for progressive and severe resp distress, with hypoxemic respiratory failure and symptoms of heart failure exacerbation.  Cardiology has been consulted for management of htn in lieu of multiple hypertensive crises; she had been discharged on multiple medications (hydral, clonidine); while controlled currently, they run risk of rebound hypertension. Given her successive hospitalizations, it would also be reasonable to work up for renovascular hypertension, pheochromocytoma, primary reji, etc.  We were also consulted as it relates to finding of pulmonary artery hypertension suggestion on TTE. While TTE doesn't show LV systolic dysfunction, it does have an enlarged LA indicating chronicity of structural loading, and of some component of diastolic dysfunction. At present time she is volume overloaded, with oxygen requirement, and likely has a significant component of Group II (cardiac) pulm artery hypertension. Expect this to improve after approaching euvolemia, and this test should be repeated to re-assess.    Plan:  - Continue diuresis, goal net neg 1-2L   - Cre unclear if ISAMAR (renocongestion?) vs. CKD. For now can continue labetalol, hydral/isordil for afterload control. Prioritize weaning off clonidine  If Cre corrects, can consider MCRA   - Microcytic anemia. F/u iron studies. if ferritin < 100 or iron sat < 20%, would give IV iron x 5 days this hospitalization  - Renal US with arterial duplex. Patient may need to be NPO prior to this study, confirm with vascular lab   - Consider measurements of urinary and plasma fractionated metanephrines, renin-reji  56yo F with PMH of HTN, HLD, DM2, anemia presents to ED for progressive and severe resp distress, with hypoxemic respiratory failure and symptoms of heart failure exacerbation.  Cardiology has been consulted for management of htn in lieu of multiple hypertensive crises; she had been discharged on multiple medications (hydral, clonidine); while controlled currently, they run risk of rebound hypertension. Given her successive hospitalizations, episodic nature, it would also be reasonable to work up for renovascular hypertension, pheochromocytoma, primary reji, etc.  We were also consulted as it relates to finding of pulmonary artery hypertension suggestion on TTE. While TTE doesn't show LV systolic dysfunction, it does have an enlarged LA indicating chronicity of structural loading, and of some component of diastolic dysfunction. At present time she is volume overloaded, with oxygen requirement, and likely has a significant component of Group II (cardiac) pulm artery hypertension. Expect this to improve after approaching euvolemia, and this test should be repeated to re-assess.    Plan:  - Continue diuresis, goal net neg 1-2L   - Cre unclear if ISAMAR (renocongestion?) vs. CKD. For now can continue labetalol, hydral/isordil for afterload control. Prioritize weaning off clonidine  If Cre corrects, can consider MCRA instead of the above    - Microcytic anemia. F/u iron studies. if ferritin < 100 or iron sat < 20%, would give IV iron x 5 days this hospitalization  - Renal US with arterial duplex. Patient may need to be NPO prior to this study, confirm with vascular lab   - Consider measurements of urinary and plasma fractionated metanephrines, renin-reji

## 2022-09-28 NOTE — H&P ADULT - PROBLEM SELECTOR PLAN 5
DVT ppx w/ lovenox  gi ppx w/ pantoprazole Unknown etx, potentially from htn emergency   - s/p 5 units insulin  - ekg if any chest pain/worsening hyperkalemia   - CTM bmp Elevated liver enzymes, likely secondary to acute decompensated HF  - f/up RUQ U/S  - CTM LFTs

## 2022-09-28 NOTE — H&P ADULT - PROBLEM SELECTOR PLAN 7
DVT ppx w/ lovenox  gi ppx w/ pantoprazole Anemia unchanged from prev hospitalization  Pt denies acute bleed  - F/up iron studies  - c/w ferrous sulfate  - outpt cancer screening per guidelines

## 2022-09-28 NOTE — ED ADULT NURSE REASSESSMENT NOTE - NS ED NURSE REASSESS COMMENT FT1
Patient returns from US- placed on cardiac monitoring- VS obtained -reported team is aware of heart rate to keep <150- Pt denies CP but reports SOB. Patient returns from US- placed on cardiac monitoring- VS obtained -Pt denies CP but reports SOB. No acute changes noted or reported.

## 2022-09-28 NOTE — H&P ADULT - PROBLEM SELECTOR PLAN 3
May be secondary to hypertensive emergency  - unkown baseline  - paperwork shows??  - Kidney U/S?  - F/up urine Cr/Na  - CTM Bun/Cr May be secondary to hypertensive emergency  - unkown baseline, elevated at last hospitlization  - f/up kidney US  - F/up urine Cr/Na  - f/up UA  - CTM Bun/Cr May be secondary to hypertensive emergency  - unknown baseline, elevated at last hospitalization  - f/up kidney US  - F/up urine Cr/Na  - f/up UA  - CTM Bun/Cr

## 2022-09-28 NOTE — H&P ADULT - NSHPLABSRESULTS_GEN_ALL_CORE
LABS:                        7.8    7.66  )-----------( 162      ( 27 Sep 2022 23:50 )             24.3     27 Sep 2022 23:50    135    |  103    |  59     ----------------------------<  370    5.8     |  20     |  1.44     Ca    9.4        27 Sep 2022 23:50  Mg     2.10      27 Sep 2022 23:50    TPro  7.1    /  Alb  4.3    /  TBili  0.7    /  DBili  x      /  AST  294    /  ALT  326    /  AlkPhos  580    27 Sep 2022 23:50      CAPILLARY BLOOD GLUCOSE      POCT Blood Glucose.: 193 mg/dL (28 Sep 2022 04:32)  POCT Blood Glucose.: 335 mg/dL (28 Sep 2022 01:52)  POCT Blood Glucose.: 415 mg/dL (27 Sep 2022 21:26)    BLOOD CULTURE    RADIOLOGY & ADDITIONAL TESTS:    I< from: Xray Chest 1 View AP/PA (09.28.22 @ 01:34) >      FINDINGS:    The lungs are clear. No pleural effusion or pneumothorax.  Cardiomediastinal silhouette is poorly evaluated on this projection.  No acute bony pathology.    IMPRESSION:    Clear lungs.    < end of copied text > Labs reviewed by me                        7.8    7.66  )-----------( 162      ( 27 Sep 2022 23:50 )             24.3     27 Sep 2022 23:50    135    |  103    |  59     ----------------------------<  370    5.8     |  20     |  1.44     Ca    9.4        27 Sep 2022 23:50  Mg     2.10      27 Sep 2022 23:50    TPro  7.1    /  Alb  4.3    /  TBili  0.7    /  DBili  x      /  AST  294    /  ALT  326    /  AlkPhos  580    27 Sep 2022 23:50      CAPILLARY BLOOD GLUCOSE      POCT Blood Glucose.: 193 mg/dL (28 Sep 2022 04:32)  POCT Blood Glucose.: 335 mg/dL (28 Sep 2022 01:52)  POCT Blood Glucose.: 415 mg/dL (27 Sep 2022 21:26)    BLOOD CULTURE      Imaging reviewed by me:  RADIOLOGY & ADDITIONAL TESTS:    I< from: Xray Chest 1 View AP/PA (09.28.22 @ 01:34) >      FINDINGS:    The lungs are clear. No pleural effusion or pneumothorax.  Cardiomediastinal silhouette is poorly evaluated on this projection.  No acute bony pathology.    IMPRESSION:    Clear lungs.    < end of copied text >    EKG interpretation: NSR, no STD or SUZANNA

## 2022-09-28 NOTE — PATIENT PROFILE ADULT - FUNCTIONAL ASSESSMENT - BASIC MOBILITY 6.
4-calculated by average/Not able to assess (calculate score using [facility] averaging method)

## 2022-09-28 NOTE — PROGRESS NOTE ADULT - PROBLEM SELECTOR PLAN 1
s/p clonidine + hydral  BP lowering goal:  - Lower by 20-25% within 1st hour  - goal 160 systolic within 2-6 hrs  - lower to normal over 24 to 48 horus  c/w home meds w/ hold parameters: amlodipine 10mg qd, labetalol 50mg BID, clonidine 0.2mg BID, hydral 50 --> increase to 100mg TID  - F/up renin/aldos  - f/up echo   - F/up tsh  - Consider renal artery duplex for ana paula down the line Patient with resistant secondary HTN of unclear etiology. Found to have severe pulmonary hypertension on Echo. D/dx for secondary HTN includes renal artery stenosis vs primary pulmonary HTN vs OLIVE, less likely pheochromocytoma given clinical symptoms.    - goal 150s systolic within 2-6 hrs, plan to lower to normal within 24-48 hrs   c/w home meds w/ hold parameters: amlodipine 10mg qd, labetalol 50mg BID, clonidine 0.2mg BID, hydral 100mg TID  - [facility] Duplex Abdomen/Pelvis pending   - TSH elevated to 8.63, Free T4 WNL  - F/up renin/reji

## 2022-09-28 NOTE — ED ADULT NURSE NOTE - OBJECTIVE STATEMENT
Pt presents with c/o sob, recently Pt presents with c/o sob, recently was seen and treated IP at [facility] for pulmonary edema and elevated K+, dc last week, states started having sob and chills while home over weekend, presents today with sob, breathing labored , unable to complete full sentences, PMH HTN, DM2, HLD, pt reports compliant with medication

## 2022-09-28 NOTE — H&P ADULT - HISTORY OF PRESENT ILLNESS
THIS NOTE IS NOT COMPLETE THIS NOTE IS NOT COMPLETE THIS NOTE IS NOT COMPLETE THIS NOTE IS NOT COMPLETE    58yo F with PMH of HTN HLD DM2 presents to ED for progressive and severe resp distress. Pt dc on 9/23 from [facility] for HTN emergency with pulm edema. She had CTA at that time which per papers was negative.  She reports compliance with her home meds. DC on PO meds and had no sxs on dc then today suddenly developed inc SOB again to pt she was having difficulty breathing. Denying CP, cough, abd pain, NVDC. Does endorse sme chills but no fever. Endorses compliance with HTN meds but unclear if she is following.    Vitals on arrival 183/124, 67 bpm, 98F, 100% RA. In the ED pt recieved 5 units insulin for hyperK of 5.8, 0.3 clonidine, 50 hydral. Most recent BP is  58yo F with PMH of HTN, HLD, DM2, anemia presents to ED for progressive and severe resp distress. the patient was recently discharged on 9/23 from [facility] for HTN emergency with pulm edema and chest pain. Her hospital course was complicated by ISAMAR and hyponatremia (resolving toward dc w/ bp control), hyperkalemia w/ peaked T waves on EKG (requiring calc gluconate, insulin, lokelma) and hyperglycemia to 400s.  She had CTA at that time which per papers was negative.  On discharge she was initiated on norvasc 10, hydral 50 TID, told to continue her clonidine 0.2 BID, increase labetalol to 100 mg 0.5 BID and stop taking hydrochlorothiazide. She reports compliance with her home meds. Per daughter pt has had multiple episodes of hypertensive emergency starting in march of this year for which she was hospitalized >3x. Today her breathing was abnormal, and she was lying down more than usual. She has worsening SOB and difficulty breathing, her  brought her to the ED. Per daughter she saw blood on the pts mask at some point however pt denies hematemsis/hemoptysis. Pt denies    CP, cough, abd pain, NVDC, fevers.     Vitals on arrival 183/124, 67 bpm, 98F, 100% RA. In the ED pt recieved 5 units insulin for hyperK of 5.8, 0.3 clonidine, 50 hydral. Most recent BP is in the 190s/100s 58yo F with PMH of HTN, HLD, DM2, anemia presents to ED for progressive and severe resp distress. the patient was recently discharged on 9/23 from [facility] for HTN emergency with pulm edema and chest pain. Her hospital course was complicated by ISAMAR and hyponatremia (resolving toward dc w/ bp control), hyperkalemia w/ peaked T waves on EKG (requiring calc gluconate, insulin, lokelma) and hyperglycemic to 400s.  She had CTA at that time which per papers was negative for PE but showed ________.  On discharge she was initiated on norvasc 10, hydral 50 TID, told to continue her clonidine 0.2 BID, change labetalol to 100 mg 0.5 BID and stop taking hydrochlorothiazide. She reports compliance with her home meds. Per daughter pt has had multiple episodes of hypertensive emergency starting in march of this year for which she was hospitalized >3x. Today her breathing was abnormal, and she was lying down more than usual. She has worsening SOB and difficulty breathing, her  brought her to the ED. Per daughter she saw blood on the pts mask at some point however pt denies hematemsis/hemoptysis. Pt denies CP, cough, abd pain, NVDC, fevers.     Vitals on arrival 183/124, 67 bpm, 98F, 100% RA. In the ED pt recieved 5 units insulin for hyperK of 5.8, 0.2 clonidine, 50 hydral. Most recent BP is in the 190s/100s 58yo F with PMH of HTN, HLD, DM2, anemia presents to ED for progressive and severe resp distress. the patient was recently discharged on 9/23 from [facility] for HTN emergency with pulm edema and chest pain. Her hospital course was complicated by ISAMAR and hyponatremia (resolving toward dc w/ bp control), hyperkalemia w/ peaked T waves on EKG (requiring calc gluconate, insulin, lokelma) and hyperglycemic to 400s.  She had CTA at that time which per papers was negative for PE but showed pulmonary edema w/ b/l pleural effusions, cardiomegaly w/ RH dysfunction and small to mod pericardial effusion.  On discharge she was initiated on norvasc 10, hydral 50 TID, told to continue her clonidine 0.2 BID, change labetalol to 100 mg 0.5 BID and stop taking hydrochlorothiazide. She reports compliance with her home meds. Per daughter pt has had multiple episodes of hypertensive emergency starting in march of this year for which she was hospitalized >3x. Today her breathing was abnormal, and she was lying down more than usual. She has worsening SOB and difficulty breathing, her  brought her to the ED. Per daughter she saw blood on the pts mask at some point however pt denies hematemsis/hemoptysis. Pt denies CP, cough, abd pain, NVDC, fevers.     Vitals on arrival 183/124, 67 bpm, 98F, 100% RA. In the ED pt recieved 5 units insulin for hyperK of 5.8, 0.2 clonidine, 50 hydral. Most recent BP is in the 190s/100s 58yo F with PMH of HTN, HLD, DM2, anemia presents to ED for progressive and severe resp distress. the patient was recently discharged on 9/23 from [facility] after one week for HTN emergency with pulm edema and chest pain. Her hospital course was complicated by ISAMAR and hyponatremia (resolving toward dc w/ bp control), hyperkalemia w/ peaked T waves on EKG (requiring calc gluconate, insulin, lokelma) and hyperglycemic to 400s.  She had CTA at that time which per papers was negative for PE but showed pulmonary edema w/ b/l pleural effusions, cardiomegaly w/ RH dysfunction and small to mod pericardial effusion.  On discharge she was initiated on norvasc 10, hydral 50 TID, told to continue her clonidine 0.2 BID, change labetalol to 100 mg 0.5 BID and stop taking hydrochlorothiazide. She reports compliance with her home meds. Per daughter pt has had multiple episodes of hypertensive emergency starting in march of this year for which she was hospitalized >3x. Today her breathing was abnormal, and she was lying down more than usual. She has worsening SOB and difficulty breathing, her  brought her to the ED. Per daughter she saw blood on the pts mask at some point however pt denies hematemsis/hemoptysis. Pt denies CP, cough, abd pain, NVDC, fevers.     Vitals on arrival 183/124, 67 bpm, 98F, 100% RA. In the ED pt recieved 5 units insulin for hyperK of 5.8, 0.2 clonidine, 50 hydral. Most recent BP is in the 190s/100s

## 2022-09-28 NOTE — H&P ADULT - NSHPPHYSICALEXAM_GEN_ALL_CORE
T(C): 36.2 (09-28-22 @ 01:05), Max: 36.7 (09-27-22 @ 21:21)  HR: 60 (09-28-22 @ 03:58) (60 - 67)  BP: 177/96 (09-28-22 @ 03:58) (130/91 - 186/144)  RR: 20 (09-28-22 @ 02:55) (15 - 22)  SpO2: 99% (09-28-22 @ 03:58) (98% - 100%)    GENERAL APPEARANCE: Well developed, NAD  HEENT:  PERRL, EOMI. hearing grossly intact.  NECK: Neck supple, non-tender no lymphadenopathy, masses or thyromegaly.  CARDIAC: Normal S1 and S2. no mrg. RRR  LUNGS: Clear to auscultation B/L, no rales, rhonchi, or wheezing  ABDOMEN: Soft , NTND, bowel sounds normal. No guarding or rebound.   MUSCULOSKELETAL: ROM intact.  No joint erythema or tenderness.   EXTREMITIES: No edema. Peripheral pulses intact.   NEUROLOGICAL: Non focal. Strength and sensation symmetric and intact throughout.   SKIN: Warm and dry , Well perfused  PSYCHIATRIC: AOx3 , Normal mood and affect T(C): 36.2 (09-28-22 @ 01:05), Max: 36.7 (09-27-22 @ 21:21)  HR: 60 (09-28-22 @ 03:58) (60 - 67)  BP: 177/96 (09-28-22 @ 03:58) (130/91 - 186/144)  RR: 20 (09-28-22 @ 02:55) (15 - 22)  SpO2: 99% (09-28-22 @ 03:58) (98% - 100%)    GENERAL APPEARANCE: Well developed, NAD  HEENT:  PERRL, EOMI. hearing grossly intact.  NECK: Neck supple, non-tender no lymphadenopathy, masses or thyromegaly.  CARDIAC: Normal S1 and S2. no mrg. RRR  LUNGS: Clear to auscultation B/L, no rales, rhonchi, or wheezing  ABDOMEN: Soft , distended, nontender. bowel sounds normal. No guarding or rebound.   MUSCULOSKELETAL: ROM intact.  No joint erythema or tenderness.   EXTREMITIES: trace peripheral edema. Peripheral pulses intact.   NEUROLOGICAL: Non focal. Strength and sensation symmetric and intact throughout.   SKIN: Warm and dry , Well perfused  PSYCHIATRIC: AOx3 , Normal mood and affect T(C): 36.2 (09-28-22 @ 01:05), Max: 36.7 (09-27-22 @ 21:21)  HR: 60 (09-28-22 @ 03:58) (60 - 67)  BP: 177/96 (09-28-22 @ 03:58) (130/91 - 186/144)  RR: 20 (09-28-22 @ 02:55) (15 - 22)  SpO2: 99% (09-28-22 @ 03:58) (98% - 100%)    GENERAL APPEARANCE: Well developed, NAD  HEENT:  PERRL, EOMI. hearing grossly intact.  NECK: Neck supple, non-tender no lymphadenopathy, masses or thyromegaly.  CARDIAC: Normal S1 and S2. no mrg. RRR, trace to 1+ pitting edema bilaterally  LUNGS: Clear to auscultation B/L, no rales, rhonchi, or wheezing, no tachypnea  ABDOMEN: Soft , distended, nontender. bowel sounds normal. No guarding or rebound.   MUSCULOSKELETAL: ROM intact.  No joint erythema or tenderness.   EXTREMITIES: trace peripheral edema. Peripheral pulses intact.   NEUROLOGICAL: Non focal. Strength and sensation symmetric and intact throughout.   SKIN: Warm and dry , Well perfused  PSYCHIATRIC: AOx3 , Normal mood and affect

## 2022-09-28 NOTE — PROGRESS NOTE ADULT - PROBLEM SELECTOR PLAN 5
Elevated liver enzymes, likely secondary to acute decompensated HF  - f/up RUQ U/S  - CTM LFTs Elevated liver enzymes, likely secondary to acute decompensated HF  - RUQ U/S: unremarkable   - CTM LFTs

## 2022-09-28 NOTE — PROGRESS NOTE ADULT - PROBLEM SELECTOR PLAN 4
Per pt takes 20 of levemir depending on FSGs at home. Missed night of admission  - Will give 7 units stat, 13 tonight  - low dose sliding scale  - CTM blood sugars for adjustment  - f/up a1c Per pt takes 20 of levemir depending on FSGs at home  - A1C: 9.2   - Will cont with 16 units of lantus   - low dose sliding scale  - CTM blood sugars for adjustment

## 2022-09-28 NOTE — PROGRESS NOTE ADULT - PROBLEM SELECTOR PLAN 6
Unknown etx, potentially from htn emergency   - s/p 5 units insulin  - ekg if any chest pain/worsening hyperkalemia   - CTM bmp IMPROVING/RESOLVED   - On admission, K: 5.8-->4.5, likely secondary to hypertensive emergency   - s/p 5 units insulin in ED   - Plan for EKG if any chest pain/worsening hyperkalemia   - CTM bmp

## 2022-09-28 NOTE — H&P ADULT - NSHPREVIEWOFSYSTEMS_GEN_ALL_CORE
CONSTITUTIONAL:  No weight loss, fever, chills, weakness or fatigue.  HEENT:  Eyes:  No visual loss, blurred vision, double vision or yellow sclerae. Ears, Nose, Throat:  No hearing loss, sneezing, congestion, runny nose or sore throat.  CARDIOVASCULAR:  No chest pain, chest pressure or chest discomfort. No palpitations.  RESPIRATORY:  No shortness of breath, cough or sputum.  GASTROINTESTINAL:  No anorexia, nausea, vomiting or diarrhea. No abdominal pain or blood.  GENITOURINARY:  Denies hematuria, dysuria.   NEUROLOGICAL:  No headache, dizziness, syncope, paralysis, ataxia, numbness or tingling in the extremities. No change in bowel or bladder control.  MUSCULOSKELETAL:  No muscle, back pain, joint pain or stiffness.  HEMATOLOGIC:  No anemia, bleeding or bruising.  LYMPHATICS:  No enlarged nodes.   PSYCHIATRIC:  No history of depression or anxiety.  ENDOCRINOLOGIC:  No reports of sweating, cold or heat intolerance. No polyuria or polydipsia.  ALLERGIES:  No history of asthma, hives, eczema or rhinitis. CONSTITUTIONAL:  No weight loss, fever  +chills, weakness, fatigue  HEENT:  Eyes:  No visual loss, blurred vision, double vision or yellow sclerae. Ears, Nose, Throat:  No hearing loss, sneezing, congestion, runny nose or sore throat.  CARDIOVASCULAR:  No chest pain, chest pressure or chest discomfort. No palpitations.  RESPIRATORY:  + shortness of breath no cough or sputum.  GASTROINTESTINAL:  No anorexia, nausea, vomiting or diarrhea. No abdominal pain or blood.  GENITOURINARY:  Denies hematuria, dysuria.   NEUROLOGICAL:  No headache, dizziness, syncope, paralysis, ataxia, numbness or tingling in the extremities. No change in bowel or bladder control.  MUSCULOSKELETAL:  No muscle, back pain, joint pain or stiffness.  HEMATOLOGIC:  No anemia, bleeding or bruising.  LYMPHATICS:  No enlarged nodes.   PSYCHIATRIC:  No history of depression or anxiety.  ENDOCRINOLOGIC:  No reports of sweating, cold or heat intolerance. No polyuria or polydipsia.  ALLERGIES:  No history of asthma, hives, eczema or rhinitis. CONSTITUTIONAL:  No weight loss, fever  +chills, weakness, fatigue  HEENT:  Eyes:  No visual loss, blurred vision, double vision or yellow sclerae. Ears, Nose, Throat:  No hearing loss, sneezing, congestion, runny nose or sore throat.  CARDIOVASCULAR:  No chest pain, chest pressure or chest discomfort. No palpitations.  RESPIRATORY:  + shortness of breath no cough or sputum.  GASTROINTESTINAL:  No anorexia, nausea, vomiting or diarrhea. No abdominal pain or blood.  GENITOURINARY:  Denies hematuria, dysuria.   NEUROLOGICAL:  No headache, dizziness, syncope, paralysis, ataxia, numbness or tingling in the extremities. No change in bowel or bladder control.  MUSCULOSKELETAL:  No muscle, back pain, joint pain or stiffness.  HEMATOLOGIC: +anemia, bleeding or bruising.  LYMPHATICS:  No enlarged nodes.   PSYCHIATRIC:  No history of depression or anxiety.  ENDOCRINOLOGIC:  No reports of sweating, cold or heat intolerance. No polyuria or polydipsia.  ALLERGIES:  No history of asthma, hives, eczema or rhinitis.

## 2022-09-28 NOTE — PROGRESS NOTE ADULT - PROBLEM SELECTOR PLAN 7
Anemia unchanged from prev hospitalization  Pt denies acute bleed  - F/up iron studies  - c/w ferrous sulfate  - outpt cancer screening per guidelines Anemia unchanged from prev hospitalization. No signs of acute bleed, pt. hemodynamically stable   - Iron studies and hemolysis labs pending   - cont to trend Hgb  - outpt cancer screening per guidelines

## 2022-09-28 NOTE — PROGRESS NOTE ADULT - PROBLEM SELECTOR PLAN 3
May be secondary to hypertensive emergency  - unknown baseline, elevated at last hospitalization  - f/up kidney US  - F/up urine Cr/Na  - f/up UA  - CTM Bun/Cr May be secondary to hypertensive emergency  - unknown baseline, elevated at last hospitalization  - F/u records from [facility] for baseline   - [facility] Duplex Abdomen/Pelvis pending   - Urine Na and Urine Cr WNL  - U/A unremarkable   - CTM Bun/Cr

## 2022-09-28 NOTE — PROGRESS NOTE ADULT - SUBJECTIVE AND OBJECTIVE BOX
LAUREN CARRILLO  57y  Female    Subjective:     Patient seen and examined. No acute overnight events.            Vital Signs Last 24 Hrs  T(C): 36.2 (28 Sep 2022 01:05), Max: 36.7 (27 Sep 2022 21:21)  T(F): 97.1 (28 Sep 2022 01:05), Max: 98 (27 Sep 2022 21:21)  HR: 60 (28 Sep 2022 08:25) (55 - 88)  BP: 146/79 (28 Sep 2022 08:25) (130/91 - 186/144)  BP(mean): --  RR: 20 (28 Sep 2022 02:55) (15 - 22)  SpO2: 97% (28 Sep 2022 07:28) (97% - 100%)    Parameters below as of 28 Sep 2022 07:28  Patient On (Oxygen Delivery Method): room air        PHYSICAL EXAM:  GENERAL: NAD, well-groomed, well-developed  HEENT - Normocephalic and atraumatic, No scleral icterus, no eye discharge, EOM intact, PERRLA; Moist mucous membranes, No lesions  NECK: Supple, No JVD  CHEST/LUNG: No Respiratory distress. Clear to auscultation bilaterally; No rales, rhonchi, wheezing  HEART: Regular rate and rhythm; No murmurs, rubs, or gallops  ABDOMEN: Soft, Nontender, Nondistended; Bowel sounds present  EXTREMITIES:  2+ Peripheral Pulses, No edema  NEURO:  No Focal deficits, sensory and motor intact  SKIN: No rashes or lesions        Consultant(s) Notes Reviewed:  [ x ] YES  [ ] NO  Care Discussed with Consultants/Other Providers [ x] YES  [ ] NO    LABS:                            7.2    5.50  )-----------( 161      ( 28 Sep 2022 07:47 )             22.9     -28    139  |  106  |  54<H>  ----------------------------<  166<H>  4.5   |  21<L>  |  1.37<H>    Ca    9.6      28 Sep 2022 07:47  Phos  3.1     -  Mg     2.10     -    TPro  7.1  /  Alb  4.3  /  TBili  0.7  /  DBili  x   /  AST  159<H>  /  ALT  275<H>  /  AlkPhos  517<H>      LIVER FUNCTIONS - ( 28 Sep 2022 07:47 )  Alb: 4.3 g/dL / Pro: 7.1 g/dL / ALK PHOS: 517 U/L / ALT: 275 U/L / AST: 159 U/L / GGT: x               LIVER FUNCTIONS - ( 28 Sep 2022 07:47 )  Alb: 4.3 g/dL / Pro: 7.1 g/dL / ALK PHOS: 517 U/L / ALT: 275 U/L / AST: 159 U/L / GGT: x           Urinalysis Basic - ( 28 Sep 2022 07:43 )    Color: Light Yellow / Appearance: Clear / S.014 / pH: x  Gluc: x / Ketone: Negative  / Bili: Negative / Urobili: <2 mg/dL   Blood: x / Protein: 300 mg/dL / Nitrite: Negative   Leuk Esterase: Negative / RBC: 4 /HPF / WBC 1 /HPF   Sq Epi: x / Non Sq Epi: 1 /HPF / Bacteria: Negative          RADIOLOGY & ADDITIONAL TESTS:    Imaging Personally Reviewed:  [ X ] YES  [ ] NO    Meds MEDICATIONS  (STANDING):  amLODIPine   Tablet 10 milliGRAM(s) Oral daily  atorvastatin 40 milliGRAM(s) Oral at bedtime  cloNIDine 0.2 milliGRAM(s) Oral two times a day  dextrose 5%. 1000 milliLiter(s) (100 mL/Hr) IV Continuous <Continuous>  dextrose 5%. 1000 milliLiter(s) (50 mL/Hr) IV Continuous <Continuous>  dextrose 50% Injectable 25 Gram(s) IV Push once  dextrose 50% Injectable 12.5 Gram(s) IV Push once  dextrose 50% Injectable 25 Gram(s) IV Push once  glucagon  Injectable 1 milliGRAM(s) IntraMuscular once  heparin   Injectable 5000 Unit(s) SubCutaneous every 12 hours  hydrALAZINE 100 milliGRAM(s) Oral three times a day  insulin glargine Injectable (LANTUS) 13 Unit(s) SubCutaneous at bedtime  insulin glargine Injectable (LANTUS) 7 Unit(s) SubCutaneous once  insulin lispro (ADMELOG) corrective regimen sliding scale   SubCutaneous three times a day before meals  insulin lispro (ADMELOG) corrective regimen sliding scale   SubCutaneous at bedtime  labetalol 50 milliGRAM(s) Oral two times a day  levothyroxine 50 MICROGram(s) Oral daily  pantoprazole    Tablet 40 milliGRAM(s) Oral before breakfast    MEDICATIONS  (PRN):  dextrose Oral Gel 15 Gram(s) Oral once PRN Blood Glucose LESS THAN 70 milliGRAM(s)/deciliter      HEALTH ISSUES - PROBLEM Dx:  Hypertensive emergency    ISAMAR (acute kidney injury)    Hyperkalemia    Prophylactic measure    Acute decompensated heart failure    Diabetes mellitus    Microcytic anemia    Transaminitis             Bishnu Hernandez, DO   PGY-1, Psychiatry    GERARDO, LAUREN  57y  Female    INTERVAL HPI/OVERNIGHT EVENTS: No overnight events. Vitals stable.     Subjective:     Patient seen and examined at bedside, on 2L NC.  Patient states that she her breathing is improved with NC and currently denies respiratory distress. Patient reports that she did not take her home dose of labetalol and clonidine last night, but has been compliant with medications otherwise. Patient states she started to experience SOB on Monday. Had been complaint with medication prior to that. Reports BPs were in the 150s systolic, with increase to 200s prior to admission. Of note, patient states she began noticing elevated BPs after COVID diagnosis. Reports B/L edema has been present since hypertensive episodes started in January. Patient denies hx of OLIVE, but has not had formal sleep study. Denies headache, fever, chills, N/V, chest pain, palpitations, abdominal pain, dysuria and hematuria.      Vital Signs Last 24 Hrs  T(C): 36.2 (28 Sep 2022 01:05), Max: 36.7 (27 Sep 2022 21:21)  T(F): 97.1 (28 Sep 2022 01:05), Max: 98 (27 Sep 2022 21:21)  HR: 60 (28 Sep 2022 08:25) (55 - 88)  BP: 146/79 (28 Sep 2022 08:25) (130/91 - 186/144)  BP(mean): --  RR: 20 (28 Sep 2022 02:55) (15 - 22)  SpO2: 97% (28 Sep 2022 07:28) (97% - 100%)    Parameters below as of 28 Sep 2022 07:28  Patient On (Oxygen Delivery Method): room air        PHYSICAL EXAM:  GENERAL: NAD, well-groomed, well-developed  HEENT - Normocephalic and atraumatic, No scleral icterus, no eye discharge, moist mucous membranes, No lesions  CHEST/LUNG: No Respiratory distress. Clear to auscultation bilaterally; No rales, rhonchi, wheezing  HEART: Regular rate and rhythm; No murmurs, rubs, or gallops, +hepatojugular reflex  EXTREMITIES:  2+ Peripheral Pulses, +1 pitting edema   NEURO:  No Focal deficits, sensory and motor intact  SKIN: No rashes or lesions      Vital Signs Last 24 Hrs  T(C): 36.2 (28 Sep 2022 01:05), Max: 36.7 (27 Sep 2022 21:21)  T(F): 97.1 (28 Sep 2022 01:05), Max: 98 (27 Sep 2022 21:21)  HR: 60 (28 Sep 2022 08:25) (55 - 88)  BP: 146/79 (28 Sep 2022 08:25) (130/91 - 186/144)  BP(mean): --  RR: 20 (28 Sep 2022 02:55) (15 - 22)  SpO2: 97% (28 Sep 2022 07:28) (97% - 100%)    Parameters below as of 28 Sep 2022 07:28  Patient On (Oxygen Delivery Method): room air        PHYSICAL EXAM:  GENERAL: NAD, well-groomed, well-developed  HEENT - Normocephalic and atraumatic, No scleral icterus, no eye discharge, EOM intact, PERRLA; Moist mucous membranes, No lesions  NECK: Supple, No JVD  CHEST/LUNG: No Respiratory distress. Clear to auscultation bilaterally; No rales, rhonchi, wheezing  HEART: Regular rate and rhythm; No murmurs, rubs, or gallops  ABDOMEN: Soft, Nontender, Nondistended; Bowel sounds present  EXTREMITIES:  2+ Peripheral Pulses, No edema  NEURO:  No Focal deficits, sensory and motor intact  SKIN: No rashes or lesions        Consultant(s) Notes Reviewed:  [ x ] YES  [ ] NO  Care Discussed with Consultants/Other Providers [ x] YES  [ ] NO    LABS:                            7.2    5.50  )-----------( 161      ( 28 Sep 2022 07:47 )             22.9         139  |  106  |  54<H>  ----------------------------<  166<H>  4.5   |  21<L>  |  1.37<H>    Ca    9.6      28 Sep 2022 07:47  Phos  3.1       Mg     2.10         TPro  7.1  /  Alb  4.3  /  TBili  0.7  /  DBili  x   /  AST  159<H>  /  ALT  275<H>  /  AlkPhos  517<H>      LIVER FUNCTIONS - ( 28 Sep 2022 07:47 )  Alb: 4.3 g/dL / Pro: 7.1 g/dL / ALK PHOS: 517 U/L / ALT: 275 U/L / AST: 159 U/L / GGT: x               LIVER FUNCTIONS - ( 28 Sep 2022 07:47 )  Alb: 4.3 g/dL / Pro: 7.1 g/dL / ALK PHOS: 517 U/L / ALT: 275 U/L / AST: 159 U/L / GGT: x           Urinalysis Basic - ( 28 Sep 2022 07:43 )    Color: Light Yellow / Appearance: Clear / S.014 / pH: x  Gluc: x / Ketone: Negative  / Bili: Negative / Urobili: <2 mg/dL   Blood: x / Protein: 300 mg/dL / Nitrite: Negative   Leuk Esterase: Negative / RBC: 4 /HPF / WBC 1 /HPF   Sq Epi: x / Non Sq Epi: 1 /HPF / Bacteria: Negative          RADIOLOGY & ADDITIONAL TESTS:    Imaging Personally Reviewed:  [ X ] YES  [ ] NO    Meds MEDICATIONS  (STANDING):  amLODIPine   Tablet 10 milliGRAM(s) Oral daily  atorvastatin 40 milliGRAM(s) Oral at bedtime  cloNIDine 0.2 milliGRAM(s) Oral two times a day  dextrose 5%. 1000 milliLiter(s) (100 mL/Hr) IV Continuous <Continuous>  dextrose 5%. 1000 milliLiter(s) (50 mL/Hr) IV Continuous <Continuous>  dextrose 50% Injectable 25 Gram(s) IV Push once  dextrose 50% Injectable 12.5 Gram(s) IV Push once  dextrose 50% Injectable 25 Gram(s) IV Push once  glucagon  Injectable 1 milliGRAM(s) IntraMuscular once  heparin   Injectable 5000 Unit(s) SubCutaneous every 12 hours  hydrALAZINE 100 milliGRAM(s) Oral three times a day  insulin glargine Injectable (LANTUS) 13 Unit(s) SubCutaneous at bedtime  insulin glargine Injectable (LANTUS) 7 Unit(s) SubCutaneous once  insulin lispro (ADMELOG) corrective regimen sliding scale   SubCutaneous three times a day before meals  insulin lispro (ADMELOG) corrective regimen sliding scale   SubCutaneous at bedtime  labetalol 50 milliGRAM(s) Oral two times a day  levothyroxine 50 MICROGram(s) Oral daily  pantoprazole    Tablet 40 milliGRAM(s) Oral before breakfast    MEDICATIONS  (PRN):  dextrose Oral Gel 15 Gram(s) Oral once PRN Blood Glucose LESS THAN 70 milliGRAM(s)/deciliter      HEALTH ISSUES - PROBLEM Dx:  Hypertensive emergency    ISAMAR (acute kidney injury)    Hyperkalemia    Prophylactic measure    Acute decompensated heart failure    Diabetes mellitus    Microcytic anemia    Transaminitis

## 2022-09-28 NOTE — H&P ADULT - PROBLEM SELECTOR PLAN 2
- BIPAP  - Pulm consult in AM - BIPAP O/N  - Pulm consult in AM Likely cause of SOB, probnp >6k  c/w BIPAP O/N  s/p lasix 20, assess for prn meed   f/up echo  BP control as above  HF consult in AM  Strict I's + Os  mg >2, K >4

## 2022-09-28 NOTE — H&P ADULT - PROBLEM SELECTOR PLAN 4
- s/p 5 units insulin  - CTM bmp - s/p 5 units insulin  - ekg if any chest pain/worsening hyperkalemia   - CTM bmp Per pt takes 20 of levemir (as needed?) at home  - Will give 7 units stat, 13 tonight  - low dose sliding scale  - CTM blood sugars for adjustment  - f/up a1c Per pt takes 20 of levemir depending on FSGs at home. Missed night of admission  - Will give 7 units stat, 13 tonight  - low dose sliding scale  - CTM blood sugars for adjustment  - f/up a1c

## 2022-09-29 LAB
A1C WITH ESTIMATED AVERAGE GLUCOSE RESULT: 9.1 % — HIGH (ref 4–5.6)
ALBUMIN SERPL ELPH-MCNC: 3.8 G/DL — SIGNIFICANT CHANGE UP (ref 3.3–5)
ALP SERPL-CCNC: 381 U/L — HIGH (ref 40–120)
ALT FLD-CCNC: 194 U/L — HIGH (ref 4–33)
ANION GAP SERPL CALC-SCNC: 11 MMOL/L — SIGNIFICANT CHANGE UP (ref 7–14)
AST SERPL-CCNC: 73 U/L — HIGH (ref 4–32)
BASOPHILS # BLD AUTO: 0.03 K/UL — SIGNIFICANT CHANGE UP (ref 0–0.2)
BASOPHILS NFR BLD AUTO: 0.5 % — SIGNIFICANT CHANGE UP (ref 0–2)
BILIRUB SERPL-MCNC: 0.3 MG/DL — SIGNIFICANT CHANGE UP (ref 0.2–1.2)
BLD GP AB SCN SERPL QL: NEGATIVE — SIGNIFICANT CHANGE UP
BUN SERPL-MCNC: 57 MG/DL — HIGH (ref 7–23)
CALCIUM SERPL-MCNC: 9.3 MG/DL — SIGNIFICANT CHANGE UP (ref 8.4–10.5)
CHLORIDE SERPL-SCNC: 106 MMOL/L — SIGNIFICANT CHANGE UP (ref 98–107)
CO2 SERPL-SCNC: 22 MMOL/L — SIGNIFICANT CHANGE UP (ref 22–31)
CREAT SERPL-MCNC: 1.7 MG/DL — HIGH (ref 0.5–1.3)
EGFR: 35 ML/MIN/1.73M2 — LOW
EOSINOPHIL # BLD AUTO: 0.74 K/UL — HIGH (ref 0–0.5)
EOSINOPHIL NFR BLD AUTO: 11.5 % — HIGH (ref 0–6)
ESTIMATED AVERAGE GLUCOSE: 214 — SIGNIFICANT CHANGE UP
FERRITIN SERPL-MCNC: 821 NG/ML — HIGH (ref 15–150)
GLUCOSE BLDC GLUCOMTR-MCNC: 139 MG/DL — HIGH (ref 70–99)
GLUCOSE BLDC GLUCOMTR-MCNC: 260 MG/DL — HIGH (ref 70–99)
GLUCOSE BLDC GLUCOMTR-MCNC: 262 MG/DL — HIGH (ref 70–99)
GLUCOSE BLDC GLUCOMTR-MCNC: 284 MG/DL — HIGH (ref 70–99)
GLUCOSE SERPL-MCNC: 130 MG/DL — HIGH (ref 70–99)
HAPTOGLOB SERPL-MCNC: 118 MG/DL — SIGNIFICANT CHANGE UP (ref 34–200)
HCT VFR BLD CALC: 21 % — CRITICAL LOW (ref 34.5–45)
HCT VFR BLD CALC: 21.5 % — LOW (ref 34.5–45)
HGB BLD-MCNC: 6.8 G/DL — CRITICAL LOW (ref 11.5–15.5)
HGB BLD-MCNC: 6.8 G/DL — CRITICAL LOW (ref 11.5–15.5)
IANC: 4.07 K/UL — SIGNIFICANT CHANGE UP (ref 1.8–7.4)
IMM GRANULOCYTES NFR BLD AUTO: 1.1 % — HIGH (ref 0–0.9)
IRON SATN MFR SERPL: 16 % — SIGNIFICANT CHANGE UP (ref 14–50)
IRON SATN MFR SERPL: 43 UG/DL — SIGNIFICANT CHANGE UP (ref 30–160)
LDH SERPL L TO P-CCNC: 249 U/L — HIGH (ref 135–225)
LYMPHOCYTES # BLD AUTO: 0.97 K/UL — LOW (ref 1–3.3)
LYMPHOCYTES # BLD AUTO: 15.1 % — SIGNIFICANT CHANGE UP (ref 13–44)
MAGNESIUM SERPL-MCNC: 2 MG/DL — SIGNIFICANT CHANGE UP (ref 1.6–2.6)
MCHC RBC-ENTMCNC: 23.6 PG — LOW (ref 27–34)
MCHC RBC-ENTMCNC: 24.5 PG — LOW (ref 27–34)
MCHC RBC-ENTMCNC: 31.6 GM/DL — LOW (ref 32–36)
MCHC RBC-ENTMCNC: 32.4 GM/DL — SIGNIFICANT CHANGE UP (ref 32–36)
MCV RBC AUTO: 74.7 FL — LOW (ref 80–100)
MCV RBC AUTO: 75.5 FL — LOW (ref 80–100)
MONOCYTES # BLD AUTO: 0.53 K/UL — SIGNIFICANT CHANGE UP (ref 0–0.9)
MONOCYTES NFR BLD AUTO: 8.3 % — SIGNIFICANT CHANGE UP (ref 2–14)
NEUTROPHILS # BLD AUTO: 4.07 K/UL — SIGNIFICANT CHANGE UP (ref 1.8–7.4)
NEUTROPHILS NFR BLD AUTO: 63.5 % — SIGNIFICANT CHANGE UP (ref 43–77)
NRBC # BLD: 0 /100 WBCS — SIGNIFICANT CHANGE UP (ref 0–0)
NRBC # BLD: 0 /100 WBCS — SIGNIFICANT CHANGE UP (ref 0–0)
NRBC # FLD: 0 K/UL — SIGNIFICANT CHANGE UP (ref 0–0)
NRBC # FLD: 0 K/UL — SIGNIFICANT CHANGE UP (ref 0–0)
PHOSPHATE SERPL-MCNC: 3.8 MG/DL — SIGNIFICANT CHANGE UP (ref 2.5–4.5)
PLATELET # BLD AUTO: 160 K/UL — SIGNIFICANT CHANGE UP (ref 150–400)
PLATELET # BLD AUTO: 168 K/UL — SIGNIFICANT CHANGE UP (ref 150–400)
POTASSIUM SERPL-MCNC: 4.9 MMOL/L — SIGNIFICANT CHANGE UP (ref 3.5–5.3)
POTASSIUM SERPL-SCNC: 4.9 MMOL/L — SIGNIFICANT CHANGE UP (ref 3.5–5.3)
PROT SERPL-MCNC: 6.1 G/DL — SIGNIFICANT CHANGE UP (ref 6–8.3)
RBC # BLD: 2.78 M/UL — LOW (ref 3.8–5.2)
RBC # BLD: 2.88 M/UL — LOW (ref 3.8–5.2)
RBC # FLD: 15.9 % — HIGH (ref 10.3–14.5)
RBC # FLD: 16 % — HIGH (ref 10.3–14.5)
RH IG SCN BLD-IMP: POSITIVE — SIGNIFICANT CHANGE UP
SODIUM SERPL-SCNC: 139 MMOL/L — SIGNIFICANT CHANGE UP (ref 135–145)
TIBC SERPL-MCNC: 266 UG/DL — SIGNIFICANT CHANGE UP (ref 220–430)
TRANSFERRIN SERPL-MCNC: 220 MG/DL — SIGNIFICANT CHANGE UP (ref 200–360)
UIBC SERPL-MCNC: 223 UG/DL — SIGNIFICANT CHANGE UP (ref 110–370)
WBC # BLD: 6.41 K/UL — SIGNIFICANT CHANGE UP (ref 3.8–10.5)
WBC # BLD: 6.73 K/UL — SIGNIFICANT CHANGE UP (ref 3.8–10.5)
WBC # FLD AUTO: 6.41 K/UL — SIGNIFICANT CHANGE UP (ref 3.8–10.5)
WBC # FLD AUTO: 6.73 K/UL — SIGNIFICANT CHANGE UP (ref 3.8–10.5)

## 2022-09-29 PROCEDURE — 93975 VASCULAR STUDY: CPT | Mod: 26

## 2022-09-29 PROCEDURE — 99233 SBSQ HOSP IP/OBS HIGH 50: CPT | Mod: GC

## 2022-09-29 PROCEDURE — 99233 SBSQ HOSP IP/OBS HIGH 50: CPT

## 2022-09-29 RX ORDER — LABETALOL HCL 100 MG
50 TABLET ORAL
Refills: 0 | Status: DISCONTINUED | OUTPATIENT
Start: 2022-09-29 | End: 2022-10-02

## 2022-09-29 RX ORDER — INSULIN GLARGINE 100 [IU]/ML
14 INJECTION, SOLUTION SUBCUTANEOUS EVERY MORNING
Refills: 0 | Status: DISCONTINUED | OUTPATIENT
Start: 2022-09-29 | End: 2022-10-03

## 2022-09-29 RX ORDER — FUROSEMIDE 40 MG
20 TABLET ORAL ONCE
Refills: 0 | Status: COMPLETED | OUTPATIENT
Start: 2022-09-29 | End: 2022-09-29

## 2022-09-29 RX ORDER — AMLODIPINE BESYLATE 2.5 MG/1
10 TABLET ORAL DAILY
Refills: 0 | Status: DISCONTINUED | OUTPATIENT
Start: 2022-09-29 | End: 2022-10-07

## 2022-09-29 RX ORDER — HYDRALAZINE HCL 50 MG
100 TABLET ORAL THREE TIMES A DAY
Refills: 0 | Status: DISCONTINUED | OUTPATIENT
Start: 2022-09-29 | End: 2022-10-07

## 2022-09-29 RX ORDER — INSULIN LISPRO 100/ML
2 VIAL (ML) SUBCUTANEOUS
Refills: 0 | Status: DISCONTINUED | OUTPATIENT
Start: 2022-09-29 | End: 2022-10-01

## 2022-09-29 RX ORDER — IRON SUCROSE 20 MG/ML
100 INJECTION, SOLUTION INTRAVENOUS EVERY 24 HOURS
Refills: 0 | Status: COMPLETED | OUTPATIENT
Start: 2022-09-29 | End: 2022-10-03

## 2022-09-29 RX ADMIN — Medication 3: at 12:17

## 2022-09-29 RX ADMIN — Medication 50 MILLIGRAM(S): at 05:29

## 2022-09-29 RX ADMIN — HEPARIN SODIUM 5000 UNIT(S): 5000 INJECTION INTRAVENOUS; SUBCUTANEOUS at 17:22

## 2022-09-29 RX ADMIN — Medication 3: at 17:24

## 2022-09-29 RX ADMIN — Medication 0.1 MILLIGRAM(S): at 15:07

## 2022-09-29 RX ADMIN — Medication 20 MILLIGRAM(S): at 17:12

## 2022-09-29 RX ADMIN — INSULIN GLARGINE 16 UNIT(S): 100 INJECTION, SOLUTION SUBCUTANEOUS at 10:20

## 2022-09-29 RX ADMIN — Medication 0.1 MILLIGRAM(S): at 21:44

## 2022-09-29 RX ADMIN — PANTOPRAZOLE SODIUM 40 MILLIGRAM(S): 20 TABLET, DELAYED RELEASE ORAL at 05:28

## 2022-09-29 RX ADMIN — HEPARIN SODIUM 5000 UNIT(S): 5000 INJECTION INTRAVENOUS; SUBCUTANEOUS at 05:28

## 2022-09-29 RX ADMIN — Medication 50 MILLIGRAM(S): at 17:22

## 2022-09-29 RX ADMIN — Medication 1: at 21:44

## 2022-09-29 RX ADMIN — Medication 0.2 MILLIGRAM(S): at 05:27

## 2022-09-29 RX ADMIN — Medication 100 MILLIGRAM(S): at 21:45

## 2022-09-29 RX ADMIN — AMLODIPINE BESYLATE 10 MILLIGRAM(S): 2.5 TABLET ORAL at 05:28

## 2022-09-29 RX ADMIN — Medication 2 UNIT(S): at 17:24

## 2022-09-29 RX ADMIN — IRON SUCROSE 210 MILLIGRAM(S): 20 INJECTION, SOLUTION INTRAVENOUS at 21:37

## 2022-09-29 RX ADMIN — ATORVASTATIN CALCIUM 40 MILLIGRAM(S): 80 TABLET, FILM COATED ORAL at 21:45

## 2022-09-29 RX ADMIN — Medication 50 MICROGRAM(S): at 05:28

## 2022-09-29 NOTE — PROGRESS NOTE ADULT - SUBJECTIVE AND OBJECTIVE BOX
Abdifatah Marina MD  Cardiology Fellow  [phone]  All Cardiology service information can be found 24/7 on [email or web address], password: [email or web address]    Patient seen and examined at bedside.    Overnight Events:     Review Of Systems: No chest pain, shortness of breath, or palpitations            Current Meds:  amLODIPine   Tablet 10 milliGRAM(s) Oral daily  atorvastatin 40 milliGRAM(s) Oral at bedtime  cloNIDine 0.2 milliGRAM(s) Oral two times a day  dextrose 5%. 1000 milliLiter(s) IV Continuous <Continuous>  dextrose 5%. 1000 milliLiter(s) IV Continuous <Continuous>  dextrose 50% Injectable 25 Gram(s) IV Push once  dextrose 50% Injectable 12.5 Gram(s) IV Push once  dextrose 50% Injectable 25 Gram(s) IV Push once  dextrose Oral Gel 15 Gram(s) Oral once PRN  glucagon  Injectable 1 milliGRAM(s) IntraMuscular once  heparin   Injectable 5000 Unit(s) SubCutaneous every 12 hours  hydrALAZINE 100 milliGRAM(s) Oral three times a day  insulin glargine Injectable (LANTUS) 16 Unit(s) SubCutaneous every morning  insulin lispro (ADMELOG) corrective regimen sliding scale   SubCutaneous three times a day before meals  insulin lispro (ADMELOG) corrective regimen sliding scale   SubCutaneous at bedtime  labetalol 50 milliGRAM(s) Oral two times a day  levothyroxine 50 MICROGram(s) Oral daily  pantoprazole    Tablet 40 milliGRAM(s) Oral before breakfast      Vitals:  T(F): 97.7 (09-29), Max: 98.1 (09-28)  HR: 53 (09-29) (53 - 88)  BP: 126/56 (09-29) (126/56 - 172/96)  RR: 17 (09-29)  SpO2: 100% (09-29)  I&O's Summary      Physical Exam:  Appearance: No acute distress; well appearing  Eyes: PERRL, EOMI, pink conjunctiva  HEENT: Normal oral mucosa  Cardiovascular: RRR, S1, S2, no murmurs, rubs, or gallops; no edema; no JVD  Respiratory: Clear to auscultation bilaterally  Gastrointestinal: soft, non-tender, non-distended with normal bowel sounds  Musculoskeletal: No clubbing; no joint deformity   Neurologic: Non-focal  Lymphatic: No lymphadenopathy  Psychiatry: AAOx3, mood & affect appropriate  Skin: No rashes, ecchymoses, or cyanosis                          6.8    6.41  )-----------( 168      ( 29 Sep 2022 06:00 )             21.5     09-29    139  |  106  |  57<H>  ----------------------------<  130<H>  4.9   |  22  |  1.70<H>    Ca    9.3      29 Sep 2022 06:00  Phos  3.8     09-29  Mg     2.00     09-29    TPro  6.1  /  Alb  3.8  /  TBili  0.3  /  DBili  x   /  AST  73<H>  /  ALT  194<H>  /  AlkPhos  381<H>  09-29      CARDIAC MARKERS ( 28 Sep 2022 01:57 )  26 ng/L / x     / x     / x     / x     / x      CARDIAC MARKERS ( 27 Sep 2022 23:50 )  26 ng/L / x     / x     / x     / x     / x          Serum Pro-Brain Natriuretic Peptide: 6177 pg/mL (09-27 @ 23:50)          New ECG(s): Personally reviewed    Echo:    Stress Testing:     Cath:    Imaging:    Interpretation of Telemetry:   Abdifatah Marina MD  Cardiology Fellow  [phone]  All Cardiology service information can be found 24/7 on [email or web address], password: [email or web address]    Patient seen and examined at bedside.    Overnight Events:     Review Of Systems: No chest pain, shortness of breath, or palpitations            Current Meds:  amLODIPine   Tablet 10 milliGRAM(s) Oral daily  atorvastatin 40 milliGRAM(s) Oral at bedtime  cloNIDine 0.2 milliGRAM(s) Oral two times a day  dextrose 5%. 1000 milliLiter(s) IV Continuous <Continuous>  dextrose 5%. 1000 milliLiter(s) IV Continuous <Continuous>  dextrose 50% Injectable 25 Gram(s) IV Push once  dextrose 50% Injectable 12.5 Gram(s) IV Push once  dextrose 50% Injectable 25 Gram(s) IV Push once  dextrose Oral Gel 15 Gram(s) Oral once PRN  glucagon  Injectable 1 milliGRAM(s) IntraMuscular once  heparin   Injectable 5000 Unit(s) SubCutaneous every 12 hours  hydrALAZINE 100 milliGRAM(s) Oral three times a day  insulin glargine Injectable (LANTUS) 16 Unit(s) SubCutaneous every morning  insulin lispro (ADMELOG) corrective regimen sliding scale   SubCutaneous three times a day before meals  insulin lispro (ADMELOG) corrective regimen sliding scale   SubCutaneous at bedtime  labetalol 50 milliGRAM(s) Oral two times a day  levothyroxine 50 MICROGram(s) Oral daily  pantoprazole    Tablet 40 milliGRAM(s) Oral before breakfast      Vitals:  T(F): 97.7 (09-29), Max: 98.1 (09-28)  HR: 53 (09-29) (53 - 88)  BP: 126/56 (09-29) (126/56 - 172/96)  RR: 17 (09-29)  SpO2: 100% (09-29)  I&O's Summary      Physical Exam:  Appearance: No acute distress; well appearing  Eyes: PERRL, EOMI, pink conjunctiva  HEENT: Normal oral mucosa  Cardiovascular: RRR, S1, S2, no murmurs, rubs, or gallops; no edema; no JVD  Respiratory: Clear to auscultation bilaterally  Gastrointestinal: soft, non-tender, non-distended with normal bowel sounds  Musculoskeletal: No clubbing; no joint deformity   Neurologic: Non-focal  Lymphatic: No lymphadenopathy  Psychiatry: AAOx3, mood & affect appropriate  Skin: No rashes, ecchymoses, or cyanosis                          6.8    6.41  )-----------( 168      ( 29 Sep 2022 06:00 )             21.5     09-29    139  |  106  |  57<H>  ----------------------------<  130<H>  4.9   |  22  |  1.70<H>    Ca    9.3      29 Sep 2022 06:00  Phos  3.8     09-29  Mg     2.00     09-29    TPro  6.1  /  Alb  3.8  /  TBili  0.3  /  DBili  x   /  AST  73<H>  /  ALT  194<H>  /  AlkPhos  381<H>  09-29      CARDIAC MARKERS ( 28 Sep 2022 01:57 )  26 ng/L / x     / x     / x     / x     / x      CARDIAC MARKERS ( 27 Sep 2022 23:50 )  26 ng/L / x     / x     / x     / x     / x          Serum Pro-Brain Natriuretic Peptide: 6177 pg/mL (09-27 @ 23:50)          New ECG(s): Personally reviewed    Echo:    Stress Testing:     Cath:    Imaging:    Interpretation of Telemetry:   Abdifatah Marina MD  Cardiology Fellow  [phone]  All Cardiology service information can be found 24/7 on [email or web address], password: [email or web address]    Patient seen and examined at bedside.    Overnight Events:   - No tele events  - Reports good UOP    Review Of Systems: No chest pain, shortness of breath, or palpitations            Current Meds:  amLODIPine   Tablet 10 milliGRAM(s) Oral daily  atorvastatin 40 milliGRAM(s) Oral at bedtime  cloNIDine 0.2 milliGRAM(s) Oral two times a day  dextrose 5%. 1000 milliLiter(s) IV Continuous <Continuous>  dextrose 5%. 1000 milliLiter(s) IV Continuous <Continuous>  dextrose 50% Injectable 25 Gram(s) IV Push once  dextrose 50% Injectable 12.5 Gram(s) IV Push once  dextrose 50% Injectable 25 Gram(s) IV Push once  dextrose Oral Gel 15 Gram(s) Oral once PRN  glucagon  Injectable 1 milliGRAM(s) IntraMuscular once  heparin   Injectable 5000 Unit(s) SubCutaneous every 12 hours  hydrALAZINE 100 milliGRAM(s) Oral three times a day  insulin glargine Injectable (LANTUS) 16 Unit(s) SubCutaneous every morning  insulin lispro (ADMELOG) corrective regimen sliding scale   SubCutaneous three times a day before meals  insulin lispro (ADMELOG) corrective regimen sliding scale   SubCutaneous at bedtime  labetalol 50 milliGRAM(s) Oral two times a day  levothyroxine 50 MICROGram(s) Oral daily  pantoprazole    Tablet 40 milliGRAM(s) Oral before breakfast      Vitals:  T(F): 97.7 (09-29), Max: 98.1 (09-28)  HR: 53 (09-29) (53 - 88)  BP: 126/56 (09-29) (126/56 - 172/96)  RR: 17 (09-29)  SpO2: 100% (09-29)  I&O's Summary      Physical Exam:  GENERAL: No acute distress, well-developed  HEAD:  Atraumatic, Normocephalic  ENT: +JVD to angle of jaw   CHEST/LUNG: crackles at the bases bilaterally   BACK: No spinal tenderness  HEART: +MENDEL  ABDOMEN: Soft, Nontender, Nondistended; Bowel sounds present  EXTREMITIES: pitting edema to lower extremities   PSYCH: Nl behavior, nl affect  NEUROLOGY: AAOx3, non-focal, cranial nerves intact  SKIN: Normal color, No rashes or lesions                          6.8    6.41  )-----------( 168      ( 29 Sep 2022 06:00 )             21.5     09-29    139  |  106  |  57<H>  ----------------------------<  130<H>  4.9   |  22  |  1.70<H>    Ca    9.3      29 Sep 2022 06:00  Phos  3.8     09-29  Mg     2.00     09-29    TPro  6.1  /  Alb  3.8  /  TBili  0.3  /  DBili  x   /  AST  73<H>  /  ALT  194<H>  /  AlkPhos  381<H>  09-29      CARDIAC MARKERS ( 28 Sep 2022 01:57 )  26 ng/L / x     / x     / x     / x     / x      CARDIAC MARKERS ( 27 Sep 2022 23:50 )  26 ng/L / x     / x     / x     / x     / x          Serum Pro-Brain Natriuretic Peptide: 6177 pg/mL (09-27 @ 23:50)          New ECG(s): Personally reviewed    Echo: DIMENSIONS:  Dimensions:     Normal Values:  LA:     3.5 cm    2.0 - 4.0 cm  Ao:     2.5 cm    2.0 - 3.8 cm  SEPTUM: 0.7 cm    0.6 - 1.2 cm  PWT:    0.8 cm    0.6 - 1.1 cm  LVIDd:  4.6 cm    3.0 - 5.6 cm  LVIDs:  3.0 cm    1.8 - 4.0 cm  Derived Variables:  LVMI: 68 g/m2  RWT: 0.34  Fractional short: 35 %  Ejection Fraction (Modified Macedo Rule): 64 %  ------------------------------------------------------------------------  OBSERVATIONS:  Mitral Valve: Normal mitral valve. Mild mitral  regurgitation.  Aortic Root: Normal aortic root.  Aortic Valve: Normal trileaflet aortic valve.  Left Atrium: Mildly dilated left atrium.  LA volume index =  36 cc/m2.  Left Ventricle: Normal left ventricular systolic function.  No segmental wall motion abnormalities. Normal left  ventricular internal dimensions and wall thicknesses.  Right Heart: Normal right atrium. The right ventricle is  not well visualized; grossly normal right ventricular  systolic function. Normal tricuspid valve.  Mild-moderate  tricuspid regurgitation. Normal pulmonic valve. Mild  pulmonic regurgitation.  Pericardium/PleuraSmall pericardial effusion posterior to  the left ventricle.  Hemodynamic: Estimated right ventricular systolic pressure  equals 68 mm Hg, assuming right atrial pressure equals 10  mm Hg, consistent with severe pulmonary hypertension.  ------------------------------------------------------------------------  CONCLUSIONS:  1. Normal mitral valve. Mild mitral regurgitation.  2. Mildly dilated left atrium.  LA volume index = 36 cc/m2.  3. Normal left ventricular internal dimensions and wall  thicknesses.  4. Normal left ventricular systolic function. No segmental  wall motion abnormalities.  5. The right ventricle is not well visualized; grossly  normal right ventricular systolic function.  6. Estimated right ventricular systolic pressure equals 68  mm Hg, assuming right atrial pressure equals 10 mm Hg,  consistent with severe pulmonary hypertension.

## 2022-09-29 NOTE — PROGRESS NOTE ADULT - PROBLEM SELECTOR PLAN 1
Patient with resistant secondary HTN of unclear etiology. Found to have severe pulmonary hypertension on Echo. D/dx for secondary HTN includes renal artery stenosis vs primary pulmonary HTN vs OLIVE, less likely pheochromocytoma given clinical symptoms.    - goal 150s systolic within 2-6 hrs, plan to lower to normal within 24-48 hrs   c/w home meds w/ hold parameters: amlodipine 10mg qd, labetalol 50mg BID, clonidine 0.2mg BID, hydral 100mg TID  - [facility] Duplex Abdomen/Pelvis pending   - TSH elevated to 8.63, Free T4 WNL  - F/up renin/reij

## 2022-09-29 NOTE — PROGRESS NOTE ADULT - PROBLEM SELECTOR PLAN 5
Elevated liver enzymes, likely secondary to acute decompensated HF  - f/up RUQ U/S  - CTM LFTs IMPROVING  Elevated liver enzymes on admission likely iso of acute decompensated HF, now improving likely 2/2 improved volume status  - RUQ: unremarkable  - ctm LFTs

## 2022-09-29 NOTE — PROGRESS NOTE ADULT - PROBLEM SELECTOR PLAN 9
HSQ  gi ppx w/ pantoprazole  Diet: DASH       Dispo: Pending medical improvement
HSQ  gi ppx w/ pantoprazole  Diet: DASH       Dispo: Pending medical improvement

## 2022-09-29 NOTE — PROGRESS NOTE ADULT - ASSESSMENT
58yo F with PMH of HTN, HLD, DM2, anemia presents to ED for progressive and severe resp distress, with hypoxemic respiratory failure and symptoms of heart failure exacerbation.  Cardiology has been consulted for management of htn in lieu of multiple hypertensive crises; she had been discharged on multiple medications (hydral, clonidine); while controlled currently, they run risk of rebound hypertension. Given her successive hospitalizations, episodic nature, it would also be reasonable to work up for renovascular hypertension, pheochromocytoma, primary reji, etc.  We were also consulted as it relates to finding of pulmonary artery hypertension suggestion on TTE. While TTE doesn't show LV systolic dysfunction, it does have an enlarged LA indicating chronicity of structural loading, and of some component of diastolic dysfunction. At present time she is volume overloaded, with oxygen requirement, and likely has a significant component of Group II (cardiac) pulm artery hypertension. Expect this to improve after approaching euvolemia, and this test should be repeated to re-assess.    Plan:  - Continue diuresis, goal net neg 1-2L   - Cre unclear if ISAMAR (renocongestion?) vs. CKD. For now can continue labetalol, hydral/isordil for afterload control. Prioritize weaning off clonidine  If Cre corrects, can consider MCRA instead of the above    - Microcytic anemia. F/u iron studies. if ferritin < 100 or iron sat < 20%, would give IV iron x 5 days this hospitalization  - Renal US with arterial duplex. Patient may need to be NPO prior to this study, confirm with vascular lab   - Consider measurements of urinary and plasma fractionated metanephrines, renin-reji  58yo F with PMH of HTN, HLD, DM2, anemia presents to ED for progressive and severe resp distress, with hypoxemic respiratory failure and symptoms of heart failure exacerbation.  Cardiology has been consulted for management of htn in lieu of multiple hypertensive crises; she had been discharged on multiple medications (hydral, clonidine); while controlled currently, they run risk of rebound hypertension. Given her successive hospitalizations, episodic nature, it would also be reasonable to work up for renovascular hypertension, pheochromocytoma, primary reji, etc.  We were also consulted as it relates to finding of pulmonary artery hypertension suggestion on TTE. While TTE doesn't show LV systolic dysfunction, it does have an enlarged LA indicating chronicity of structural loading, and of some component of diastolic dysfunction. At present time she is volume overloaded, with oxygen requirement, and likely has a significant component of Group II (cardiac) pulm artery hypertension. Expect this to improve after approaching euvolemia, and this test should be repeated to re-assess.    Plan:  - Continue diuresis, goal net neg 1-2L   - Cre unclear if ISAMAR (renocongestion?) vs. CKD. For now can continue labetalol, hydral/isordil for afterload control. Prioritize weaning off clonidine  If Cre corrects, can consider MCRA instead of the above    - Microcytic anemia. F/u iron studies. if ferritin < 100 or iron sat < 20%, would give IV iron x 5 days this hospitalization  - Renal US with arterial duplex. Patient may need to be NPO prior to this study, confirm with vascular lab   - Consider measurements of urinary and plasma fractionated metanephrines, renin-reji   - Will repeat TTE to reassess pulmonary pressures once more euvolemic

## 2022-09-29 NOTE — PROGRESS NOTE ADULT - PROBLEM SELECTOR PLAN 1
s/p clonidine + hydral  BP lowering goal:  - Lower by 20-25% within 1st hour  - goal 160 systolic within 2-6 hrs  - lower to normal over 24 to 48 horus  c/w home meds w/ hold parameters: amlodipine 10mg qd, labetalol 50mg BID, clonidine 0.2mg BID, hydral 50 --> increase to 100mg TID  - F/up renin/aldos  - f/up echo   - F/up tsh  - Consider renal artery duplex for ana paula down the line Patient with resistant secondary HTN of unclear etiology. Found to have severe pulmonary hypertension on Echo. D/dx for secondary HTN includes renal artery stenosis vs primary pulmonary HTN vs OLIVE, less likely pheochromocytoma given clinical symptoms.    - goal 120s-150s systolic    - c/w home meds w/ hold parameters: amlodipine 10mg qd, labetalol 50mg BID, clonidine 0.2mg BID, hydral 100mg TID  - plan to wean off clonidine as per Cardio recs-->start clonidine 0.1 TID  - [facility] Duplex Abdomen/Pelvis pending   - TSH elevated to 8.63, Free T4 WNL  - f/u renin/reji  - f/w urine/plasma metanephrines

## 2022-09-29 NOTE — PROGRESS NOTE ADULT - SUBJECTIVE AND OBJECTIVE BOX
LAUREN CARRILLO  57y  Female    Subjective:     Patient seen and examined. No acute overnight events.            Vital Signs Last 24 Hrs  T(C): 36.5 (29 Sep 2022 04:51), Max: 36.7 (28 Sep 2022 22:12)  T(F): 97.7 (29 Sep 2022 04:51), Max: 98.1 (28 Sep 2022 22:12)  HR: 58 (29 Sep 2022 04:51) (58 - 88)  BP: 157/74 (29 Sep 2022 04:51) (130/60 - 172/96)  BP(mean): --  RR: 18 (29 Sep 2022 04:51) (18 - 20)  SpO2: 100% (29 Sep 2022 04:51) (97% - 100%)    Parameters below as of 29 Sep 2022 04:51  Patient On (Oxygen Delivery Method): room air        PHYSICAL EXAM:  GENERAL: NAD, well-groomed, well-developed  HEENT - Normocephalic and atraumatic, No scleral icterus, no eye discharge, EOM intact, PERRLA; Moist mucous membranes, No lesions  NECK: Supple, No JVD  CHEST/LUNG: No Respiratory distress. Clear to auscultation bilaterally; No rales, rhonchi, wheezing  HEART: Regular rate and rhythm; No murmurs, rubs, or gallops  ABDOMEN: Soft, Nontender, Nondistended; Bowel sounds present  EXTREMITIES:  2+ Peripheral Pulses, No edema  NEURO:  No Focal deficits, sensory and motor intact  SKIN: No rashes or lesions        Consultant(s) Notes Reviewed:  [ x ] YES  [ ] NO  Care Discussed with Consultants/Other Providers [ x] YES  [ ] NO    LABS:                            7.2    5.50  )-----------( 161      ( 28 Sep 2022 07:47 )             22.9     09-28    139  |  106  |  54<H>  ----------------------------<  166<H>  4.5   |  21<L>  |  1.37<H>    Ca    9.6      28 Sep 2022 07:47  Phos  3.1     -  Mg     2.10     -    TPro  7.1  /  Alb  4.3  /  TBili  0.7  /  DBili  x   /  AST  159<H>  /  ALT  275<H>  /  AlkPhos  517<H>      LIVER FUNCTIONS - ( 28 Sep 2022 07:47 )  Alb: 4.3 g/dL / Pro: 7.1 g/dL / ALK PHOS: 517 U/L / ALT: 275 U/L / AST: 159 U/L / GGT: x               LIVER FUNCTIONS - ( 28 Sep 2022 07:47 )  Alb: 4.3 g/dL / Pro: 7.1 g/dL / ALK PHOS: 517 U/L / ALT: 275 U/L / AST: 159 U/L / GGT: x           Urinalysis Basic - ( 28 Sep 2022 07:43 )    Color: Light Yellow / Appearance: Clear / S.014 / pH: x  Gluc: x / Ketone: Negative  / Bili: Negative / Urobili: <2 mg/dL   Blood: x / Protein: 300 mg/dL / Nitrite: Negative   Leuk Esterase: Negative / RBC: 4 /HPF / WBC 1 /HPF   Sq Epi: x / Non Sq Epi: 1 /HPF / Bacteria: Negative          RADIOLOGY & ADDITIONAL TESTS:    Imaging Personally Reviewed:  [ X ] YES  [ ] NO    Meds MEDICATIONS  (STANDING):  amLODIPine   Tablet 10 milliGRAM(s) Oral daily  atorvastatin 40 milliGRAM(s) Oral at bedtime  cloNIDine 0.2 milliGRAM(s) Oral two times a day  dextrose 5%. 1000 milliLiter(s) (100 mL/Hr) IV Continuous <Continuous>  dextrose 5%. 1000 milliLiter(s) (50 mL/Hr) IV Continuous <Continuous>  dextrose 50% Injectable 25 Gram(s) IV Push once  dextrose 50% Injectable 12.5 Gram(s) IV Push once  dextrose 50% Injectable 25 Gram(s) IV Push once  glucagon  Injectable 1 milliGRAM(s) IntraMuscular once  heparin   Injectable 5000 Unit(s) SubCutaneous every 12 hours  hydrALAZINE 100 milliGRAM(s) Oral three times a day  insulin glargine Injectable (LANTUS) 16 Unit(s) SubCutaneous every morning  insulin lispro (ADMELOG) corrective regimen sliding scale   SubCutaneous three times a day before meals  insulin lispro (ADMELOG) corrective regimen sliding scale   SubCutaneous at bedtime  labetalol 50 milliGRAM(s) Oral two times a day  levothyroxine 50 MICROGram(s) Oral daily  pantoprazole    Tablet 40 milliGRAM(s) Oral before breakfast    MEDICATIONS  (PRN):  dextrose Oral Gel 15 Gram(s) Oral once PRN Blood Glucose LESS THAN 70 milliGRAM(s)/deciliter      HEALTH ISSUES - PROBLEM Dx:  Hypertensive emergency    Acute decompensated heart failure    ISAMAR (acute kidney injury)    Diabetes mellitus    Transaminitis    Hyperkalemia    Microcytic anemia    Hypothyroidism    Prophylactic measure             Bishnu Hernandez, DO     GERARDO, LAUREN  57y  Female    Subjective:     Patient seen and examined. No acute overnight events.            Vital Signs Last 24 Hrs  T(C): 36.5 (29 Sep 2022 04:51), Max: 36.7 (28 Sep 2022 22:12)  T(F): 97.7 (29 Sep 2022 04:51), Max: 98.1 (28 Sep 2022 22:12)  HR: 58 (29 Sep 2022 04:51) (58 - 88)  BP: 157/74 (29 Sep 2022 04:51) (130/60 - 172/96)  BP(mean): --  RR: 18 (29 Sep 2022 04:51) (18 - 20)  SpO2: 100% (29 Sep 2022 04:51) (97% - 100%)    Parameters below as of 29 Sep 2022 04:51  Patient On (Oxygen Delivery Method): room air        PHYSICAL EXAM:  GENERAL: NAD, well-groomed, well-developed  HEENT - Normocephalic and atraumatic, No scleral icterus, no eye discharge, EOM intact, PERRLA; Moist mucous membranes, No lesions  NECK: Supple, No JVD  CHEST/LUNG: No Respiratory distress. Clear to auscultation bilaterally; No rales, rhonchi, wheezing  HEART: Regular rate and rhythm; No murmurs, rubs, or gallops  ABDOMEN: Soft, Nontender, Nondistended; Bowel sounds present  EXTREMITIES:  2+ Peripheral Pulses, No edema  NEURO:  No Focal deficits, sensory and motor intact  SKIN: No rashes or lesions        Consultant(s) Notes Reviewed:  [ x ] YES  [ ] NO  Care Discussed with Consultants/Other Providers [ x] YES  [ ] NO    LABS:                            7.2    5.50  )-----------( 161      ( 28 Sep 2022 07:47 )             22.9     -    139  |  106  |  54<H>  ----------------------------<  166<H>  4.5   |  21<L>  |  1.37<H>    Ca    9.6      28 Sep 2022 07:47  Phos  3.1     -  Mg     2.10     -    TPro  7.1  /  Alb  4.3  /  TBili  0.7  /  DBili  x   /  AST  159<H>  /  ALT  275<H>  /  AlkPhos  517<H>      LIVER FUNCTIONS - ( 28 Sep 2022 07:47 )  Alb: 4.3 g/dL / Pro: 7.1 g/dL / ALK PHOS: 517 U/L / ALT: 275 U/L / AST: 159 U/L / GGT: x               LIVER FUNCTIONS - ( 28 Sep 2022 07:47 )  Alb: 4.3 g/dL / Pro: 7.1 g/dL / ALK PHOS: 517 U/L / ALT: 275 U/L / AST: 159 U/L / GGT: x           Urinalysis Basic - ( 28 Sep 2022 07:43 )    Color: Light Yellow / Appearance: Clear / S.014 / pH: x  Gluc: x / Ketone: Negative  / Bili: Negative / Urobili: <2 mg/dL   Blood: x / Protein: 300 mg/dL / Nitrite: Negative   Leuk Esterase: Negative / RBC: 4 /HPF / WBC 1 /HPF   Sq Epi: x / Non Sq Epi: 1 /HPF / Bacteria: Negative          RADIOLOGY & ADDITIONAL TESTS:    Imaging Personally Reviewed:  [ X ] YES  [ ] NO    Meds MEDICATIONS  (STANDING):  amLODIPine   Tablet 10 milliGRAM(s) Oral daily  atorvastatin 40 milliGRAM(s) Oral at bedtime  cloNIDine 0.2 milliGRAM(s) Oral two times a day  dextrose 5%. 1000 milliLiter(s) (100 mL/Hr) IV Continuous <Continuous>  dextrose 5%. 1000 milliLiter(s) (50 mL/Hr) IV Continuous <Continuous>  dextrose 50% Injectable 25 Gram(s) IV Push once  dextrose 50% Injectable 12.5 Gram(s) IV Push once  dextrose 50% Injectable 25 Gram(s) IV Push once  glucagon  Injectable 1 milliGRAM(s) IntraMuscular once  heparin   Injectable 5000 Unit(s) SubCutaneous every 12 hours  hydrALAZINE 100 milliGRAM(s) Oral three times a day  insulin glargine Injectable (LANTUS) 16 Unit(s) SubCutaneous every morning  insulin lispro (ADMELOG) corrective regimen sliding scale   SubCutaneous three times a day before meals  insulin lispro (ADMELOG) corrective regimen sliding scale   SubCutaneous at bedtime  labetalol 50 milliGRAM(s) Oral two times a day  levothyroxine 50 MICROGram(s) Oral daily  pantoprazole    Tablet 40 milliGRAM(s) Oral before breakfast    MEDICATIONS  (PRN):  dextrose Oral Gel 15 Gram(s) Oral once PRN Blood Glucose LESS THAN 70 milliGRAM(s)/deciliter      HEALTH ISSUES - PROBLEM Dx:  Hypertensive emergency    Acute decompensated heart failure    ISAMAR (acute kidney injury)    Diabetes mellitus    Transaminitis    Hyperkalemia    Microcytic anemia    Hypothyroidism    Prophylactic measure             Bishnu Hernandez,    PGY-1, Psychiatry    LAUREN CARRILLO  57y  Female    INTERVAL HPI/OVERNIGHT EVENTS: No overnight events. Vitals stable.    Subjective:     Patient seen and examined at bedside. Patient denies symptoms of respiratory distress during assessment. Denied SOB or orthopnea and reports that she feels her symptoms have improved. Otherwise, no complaints. Denies headache, lightheadedness, chest pain, SOB, palpitations, abdominal pain or dysuria.      Vital Signs Last 24 Hrs  T(C): 36.5 (29 Sep 2022 04:51), Max: 36.7 (28 Sep 2022 22:12)  T(F): 97.7 (29 Sep 2022 04:51), Max: 98.1 (28 Sep 2022 22:12)  HR: 58 (29 Sep 2022 04:51) (58 - 88)  BP: 157/74 (29 Sep 2022 04:51) (130/60 - 172/96)  BP(mean): --  RR: 18 (29 Sep 2022 04:51) (18 - 20)  SpO2: 100% (29 Sep 2022 04:51) (97% - 100%)    Parameters below as of 29 Sep 2022 04:51  Patient On (Oxygen Delivery Method): room air        PHYSICAL EXAM:  GENERAL: NAD, well-groomed, well-developed  HEENT - Normocephalic and atraumatic, No scleral icterus, no eye discharge, moist mucous membranes, No lesions  CHEST/LUNG: No Respiratory distress. Clear to auscultation bilaterally; No rales, rhonchi, wheezing  HEART: Regular rate and rhythm; No murmurs, rubs, or gallops  ABDOMEN: Soft, Nontender, Nondistended; Bowel sounds present        Consultant(s) Notes Reviewed:  [ x ] YES  [ ] NO  Care Discussed with Consultants/Other Providers [ x] YES  [ ] NO    LABS:                            7.2    5.50  )-----------( 161      ( 28 Sep 2022 07:47 )             22.9     -    139  |  106  |  54<H>  ----------------------------<  166<H>  4.5   |  21<L>  |  1.37<H>    Ca    9.6      28 Sep 2022 07:47  Phos  3.1       Mg     2.10         TPro  7.1  /  Alb  4.3  /  TBili  0.7  /  DBili  x   /  AST  159<H>  /  ALT  275<H>  /  AlkPhos  517<H>      LIVER FUNCTIONS - ( 28 Sep 2022 07:47 )  Alb: 4.3 g/dL / Pro: 7.1 g/dL / ALK PHOS: 517 U/L / ALT: 275 U/L / AST: 159 U/L / GGT: x               LIVER FUNCTIONS - ( 28 Sep 2022 07:47 )  Alb: 4.3 g/dL / Pro: 7.1 g/dL / ALK PHOS: 517 U/L / ALT: 275 U/L / AST: 159 U/L / GGT: x           Urinalysis Basic - ( 28 Sep 2022 07:43 )    Color: Light Yellow / Appearance: Clear / S.014 / pH: x  Gluc: x / Ketone: Negative  / Bili: Negative / Urobili: <2 mg/dL   Blood: x / Protein: 300 mg/dL / Nitrite: Negative   Leuk Esterase: Negative / RBC: 4 /HPF / WBC 1 /HPF   Sq Epi: x / Non Sq Epi: 1 /HPF / Bacteria: Negative          RADIOLOGY & ADDITIONAL TESTS:    Imaging Personally Reviewed:  [ X ] YES  [ ] NO    Meds MEDICATIONS  (STANDING):  amLODIPine   Tablet 10 milliGRAM(s) Oral daily  atorvastatin 40 milliGRAM(s) Oral at bedtime  cloNIDine 0.2 milliGRAM(s) Oral two times a day  dextrose 5%. 1000 milliLiter(s) (100 mL/Hr) IV Continuous <Continuous>  dextrose 5%. 1000 milliLiter(s) (50 mL/Hr) IV Continuous <Continuous>  dextrose 50% Injectable 25 Gram(s) IV Push once  dextrose 50% Injectable 12.5 Gram(s) IV Push once  dextrose 50% Injectable 25 Gram(s) IV Push once  glucagon  Injectable 1 milliGRAM(s) IntraMuscular once  heparin   Injectable 5000 Unit(s) SubCutaneous every 12 hours  hydrALAZINE 100 milliGRAM(s) Oral three times a day  insulin glargine Injectable (LANTUS) 16 Unit(s) SubCutaneous every morning  insulin lispro (ADMELOG) corrective regimen sliding scale   SubCutaneous three times a day before meals  insulin lispro (ADMELOG) corrective regimen sliding scale   SubCutaneous at bedtime  labetalol 50 milliGRAM(s) Oral two times a day  levothyroxine 50 MICROGram(s) Oral daily  pantoprazole    Tablet 40 milliGRAM(s) Oral before breakfast    MEDICATIONS  (PRN):  dextrose Oral Gel 15 Gram(s) Oral once PRN Blood Glucose LESS THAN 70 milliGRAM(s)/deciliter      HEALTH ISSUES - PROBLEM Dx:  Hypertensive emergency    Acute decompensated heart failure    ISAMAR (acute kidney injury)    Diabetes mellitus    Transaminitis    Hyperkalemia    Microcytic anemia    Hypothyroidism    Prophylactic measure

## 2022-09-29 NOTE — PROGRESS NOTE ADULT - ASSESSMENT
56yo F with PMH of HTN, HLD, DM2, anemia presenting w resistant hypertension resulting in SOB c/f flash pulm edema. Echo remarkable for elevated right-sided pressures consistent with severe pulmonary hypertension of unclear etiology. Cards c/s pending. Cont. with home BP meds, s/p IV lasix 20mg x doses for diureses. Dispo pending medical improvement.

## 2022-09-29 NOTE — PROGRESS NOTE ADULT - PROBLEM SELECTOR PLAN 4
Per pt takes 20 of levemir depending on FSGs at home. Missed night of admission  - Will give 7 units stat, 13 tonight  - low dose sliding scale  - CTM blood sugars for adjustment  - f/up a1c Per pt takes 20 of levemir depending on FSGs at home. Missed night of admission  - Increasing FSGs over past 24 hours   - 14u lantus, 2 units pre-meal ordered   - c/w low dose sliding scale  - ctm blood sugars for adjustment

## 2022-09-29 NOTE — PROGRESS NOTE ADULT - PROBLEM SELECTOR PLAN 2
On admission, probnp >6k. On exam, clinical symptoms of right sided heart failure evident. Echo: EF: 64%, elevated right sided pressures consistent with severe pulmonary hypertension.  - d/c BIPAP O/N  - s/p IV lasix 20 x 2 doses, reassess for symptoms and evaluate if patient requires prn   - Cards c/s pending   - Strict I's and O's  - Goal of Mg >2, K >4

## 2022-09-29 NOTE — PROGRESS NOTE ADULT - PROBLEM SELECTOR PLAN 4
Per pt takes 20 of levemir depending on FSGs at home  - A1C: 9.2   - Will cont with 16 units of lantus   - low dose sliding scale  - CTM blood sugars for adjustment

## 2022-09-29 NOTE — PROGRESS NOTE ADULT - PROBLEM SELECTOR PLAN 2
Likely cause of SOB, probnp >6k  c/w BIPAP O/N  s/p lasix 20, assess for prn meed   f/up echo  BP control as above  HF consult in AM  Strict I's + Os  mg >2, K >4 Acute decompensated heart failure.   On admission, probnp. Echo: EF: 64%, elevated right sided pressures consistent with severe pulmonary hypertension, but no LV dysfunction   - d/c BIPAP O/N  - s/p IV lasix 20 x 3 doses, reassess for symptoms and evaluate if patient requires additional prns  - Cards: repeat Echo when euvolemic to reassess pulmonary hypertension as patient is fluid overloaded   - Strict I's and O's  - Goal of Mg >2, K >4.

## 2022-09-29 NOTE — PROGRESS NOTE ADULT - SUBJECTIVE AND OBJECTIVE BOX
LAUREN CARRILLO  57y  Female    Subjective:     Patient seen and examined. No acute overnight events.            Vital Signs Last 24 Hrs  T(C): 36.5 (29 Sep 2022 04:51), Max: 36.7 (28 Sep 2022 22:12)  T(F): 97.7 (29 Sep 2022 04:51), Max: 98.1 (28 Sep 2022 22:12)  HR: 58 (29 Sep 2022 04:51) (58 - 88)  BP: 157/74 (29 Sep 2022 04:51) (130/60 - 172/96)  BP(mean): --  RR: 18 (29 Sep 2022 04:51) (18 - 20)  SpO2: 100% (29 Sep 2022 04:51) (97% - 100%)    Parameters below as of 29 Sep 2022 04:51  Patient On (Oxygen Delivery Method): room air        PHYSICAL EXAM:  GENERAL: NAD, well-groomed, well-developed  HEENT - Normocephalic and atraumatic, No scleral icterus, no eye discharge, EOM intact, PERRLA; Moist mucous membranes, No lesions  NECK: Supple, No JVD  CHEST/LUNG: No Respiratory distress. Clear to auscultation bilaterally; No rales, rhonchi, wheezing  HEART: Regular rate and rhythm; No murmurs, rubs, or gallops  ABDOMEN: Soft, Nontender, Nondistended; Bowel sounds present  EXTREMITIES:  2+ Peripheral Pulses, No edema  NEURO:  No Focal deficits, sensory and motor intact  SKIN: No rashes or lesions        Consultant(s) Notes Reviewed:  [ x ] YES  [ ] NO  Care Discussed with Consultants/Other Providers [ x] YES  [ ] NO    LABS:                            7.2    5.50  )-----------( 161      ( 28 Sep 2022 07:47 )             22.9     09-28    139  |  106  |  54<H>  ----------------------------<  166<H>  4.5   |  21<L>  |  1.37<H>    Ca    9.6      28 Sep 2022 07:47  Phos  3.1     -  Mg     2.10     -    TPro  7.1  /  Alb  4.3  /  TBili  0.7  /  DBili  x   /  AST  159<H>  /  ALT  275<H>  /  AlkPhos  517<H>      LIVER FUNCTIONS - ( 28 Sep 2022 07:47 )  Alb: 4.3 g/dL / Pro: 7.1 g/dL / ALK PHOS: 517 U/L / ALT: 275 U/L / AST: 159 U/L / GGT: x               LIVER FUNCTIONS - ( 28 Sep 2022 07:47 )  Alb: 4.3 g/dL / Pro: 7.1 g/dL / ALK PHOS: 517 U/L / ALT: 275 U/L / AST: 159 U/L / GGT: x           Urinalysis Basic - ( 28 Sep 2022 07:43 )    Color: Light Yellow / Appearance: Clear / S.014 / pH: x  Gluc: x / Ketone: Negative  / Bili: Negative / Urobili: <2 mg/dL   Blood: x / Protein: 300 mg/dL / Nitrite: Negative   Leuk Esterase: Negative / RBC: 4 /HPF / WBC 1 /HPF   Sq Epi: x / Non Sq Epi: 1 /HPF / Bacteria: Negative          RADIOLOGY & ADDITIONAL TESTS:    Imaging Personally Reviewed:  [ X ] YES  [ ] NO    Meds MEDICATIONS  (STANDING):  amLODIPine   Tablet 10 milliGRAM(s) Oral daily  atorvastatin 40 milliGRAM(s) Oral at bedtime  cloNIDine 0.2 milliGRAM(s) Oral two times a day  dextrose 5%. 1000 milliLiter(s) (100 mL/Hr) IV Continuous <Continuous>  dextrose 5%. 1000 milliLiter(s) (50 mL/Hr) IV Continuous <Continuous>  dextrose 50% Injectable 25 Gram(s) IV Push once  dextrose 50% Injectable 12.5 Gram(s) IV Push once  dextrose 50% Injectable 25 Gram(s) IV Push once  glucagon  Injectable 1 milliGRAM(s) IntraMuscular once  heparin   Injectable 5000 Unit(s) SubCutaneous every 12 hours  hydrALAZINE 100 milliGRAM(s) Oral three times a day  insulin glargine Injectable (LANTUS) 16 Unit(s) SubCutaneous every morning  insulin lispro (ADMELOG) corrective regimen sliding scale   SubCutaneous three times a day before meals  insulin lispro (ADMELOG) corrective regimen sliding scale   SubCutaneous at bedtime  labetalol 50 milliGRAM(s) Oral two times a day  levothyroxine 50 MICROGram(s) Oral daily  pantoprazole    Tablet 40 milliGRAM(s) Oral before breakfast    MEDICATIONS  (PRN):  dextrose Oral Gel 15 Gram(s) Oral once PRN Blood Glucose LESS THAN 70 milliGRAM(s)/deciliter      HEALTH ISSUES - PROBLEM Dx:  Hypertensive emergency    Acute decompensated heart failure    ISAMAR (acute kidney injury)    Diabetes mellitus    Transaminitis    Hyperkalemia    Microcytic anemia    Hypothyroidism    Prophylactic measure

## 2022-09-29 NOTE — PROGRESS NOTE ADULT - PROBLEM SELECTOR PLAN 7
Anemia unchanged from prev hospitalization. No signs of acute bleed, pt. hemodynamically stable   - Iron studies and hemolysis labs pending   - cont to trend Hgb  - outpt cancer screening per guidelines

## 2022-09-29 NOTE — PROGRESS NOTE ADULT - PROBLEM SELECTOR PLAN 6
IMPROVING/RESOLVED   - On admission, K: 5.8-->4.5, likely secondary to hypertensive emergency   - s/p 5 units insulin in ED   - Plan for EKG if any chest pain/worsening hyperkalemia   - CTM bmp

## 2022-09-29 NOTE — PROGRESS NOTE ADULT - PROBLEM SELECTOR PLAN 3
May be secondary to hypertensive emergency  - unknown baseline, elevated at last hospitalization  - F/u records from [facility] for baseline   - [facility] Duplex Abdomen/Pelvis pending   - Urine Na and Urine Cr WNL  - U/A unremarkable   - CTM Bun/Cr

## 2022-09-29 NOTE — PROGRESS NOTE ADULT - ASSESSMENT
58yo F with PMH of HTN, HLD, DM2, anemia presenting w resistant hypertension resulting in SOB c/f flash pulm edema

## 2022-09-29 NOTE — PROGRESS NOTE ADULT - PROBLEM SELECTOR PLAN 6
Unknown etx, potentially from htn emergency   - s/p 5 units insulin  - ekg if any chest pain/worsening hyperkalemia   - CTM bmp Hgb to 6.8 this morning, 1 unit of packed RBCs transfused. Unclear etiology, possibly undiagnosed CKD, less likely active bleed as patient is hemodynamically stable and asymptomatic.  - Iron studies WNL  - Iron saturation <20%, plan to administer IV iron sucrose x 5 days   - Hemolysis studies negative   - outpt cancer screening per guideline

## 2022-09-29 NOTE — PROGRESS NOTE ADULT - PROBLEM SELECTOR PLAN 7
Anemia unchanged from prev hospitalization  Pt denies acute bleed  - F/up iron studies  - c/w ferrous sulfate  - outpt cancer screening per guidelines HSQ  gi ppx w/ pantoprazole

## 2022-09-29 NOTE — PROGRESS NOTE ADULT - PROBLEM SELECTOR PLAN 3
May be secondary to hypertensive emergency  - unknown baseline, elevated at last hospitalization  - f/up kidney US  - F/up urine Cr/Na  - f/up UA  - CTM Bun/Cr Worsening Scr to 1.7. likely sequelae 2/2 to hypertensive emergency. Cannot r/o underlying CKD  - unknown baseline, elevated at last hospitalization  - pending [facility] records for baseline   - [facility] Duplex Abdomen/Pelvis pending   - Urine Na and Urine Cr WNL  - U/A unremarkable   - CTM Bun/Cr.

## 2022-09-29 NOTE — PROGRESS NOTE ADULT - ATTENDING COMMENTS
57 y.o. F w/ a hx of HTN, DM2, hypothyroidism hospitalized for ADHF 2/2 HTN emergency.     Patient feels well, breathing improved. Denies any lightheadedness/dizziness, increased thirst. UOP has been average. PE: 2+ LE edema to knees, moist MM. Cr 1.7, Hbg 6.8. Reportedly underwent a colonoscopy at OSH but was not a good study.     # HTN emergency c/b ADHF: BP improved. Lower clonidine to .1mg TID, cont home meds. Follow up renal Dopplers, renin/reji, plasma metanephrines. Awaiting records from [facility].   # ISAMAR: Unknown baseline though Cr rising today. BP improved, no nephrotoxic medications, no hydro on US. Does not appear to be over-diuresed. Follow up Cr and monitor I/O's.  # DM2: Change to Lantus 14 units + SSI, will add on meal time coverage as needed pending FS checks.   # Transaminitis: Suspect hepatic congestion, improving with diuresis, continue.   # Hypothyroidism: TSH elevated 8.63- will need to obtain collateral if this is an improvement from OP. Cont Synthroid.   # Microcytic anemia: Iron supplementation, follow up workup from [facility]. 57 y.o. F w/ a hx of HTN, DM2, hypothyroidism hospitalized for ADHF 2/2 HTN emergency.     Patient feels well, breathing improved. Denies any lightheadedness/dizziness, increased thirst. UOP has been average. PE: 2+ LE edema to knees, moist MM. Cr 1.7, Hbg 6.8. Reportedly underwent a colonoscopy at OSH but was not a good study.     # HTN emergency c/b ADHF: BP improved. Lower clonidine to .1mg TID, cont home meds. Follow up renal Dopplers, renin/reji, plasma metanephrines. Awaiting records from [facility].   # ISAMAR: Unknown baseline though Cr rising today. BP improved, no nephrotoxic medications, no hydro on US. Does not appear to be over-diuresed. Follow up Cr and monitor I/O's.  # DM2: Change to Lantus 14 units + SSI, will add on meal time coverage as needed pending FS checks.   # Transaminitis: Suspect hepatic congestion, improving with diuresis, continue.   # Hypothyroidism: TSH elevated 8.63- will need to obtain collateral if this is an improvement from OP. Cont Synthroid.   # Microcytic anemia: Transfuse 1 unit PRBC today. Iron supplementation, follow up workup from [facility].

## 2022-09-29 NOTE — PROGRESS NOTE ADULT - ATTENDING COMMENTS
Continue diuresis, wean clonidine as tolerated.   Echo showed normal LV and RV function, and elevated PASP 68s   Follow up Renal Us duplex

## 2022-09-29 NOTE — PROGRESS NOTE ADULT - PROBLEM SELECTOR PLAN 5
Elevated liver enzymes, likely secondary to acute decompensated HF  - RUQ U/S: unremarkable   - CTM LFTs

## 2022-09-30 LAB
ALBUMIN SERPL ELPH-MCNC: 3.8 G/DL — SIGNIFICANT CHANGE UP (ref 3.3–5)
ALP SERPL-CCNC: 377 U/L — HIGH (ref 40–120)
ALT FLD-CCNC: 155 U/L — HIGH (ref 4–33)
ANION GAP SERPL CALC-SCNC: 11 MMOL/L — SIGNIFICANT CHANGE UP (ref 7–14)
AST SERPL-CCNC: 44 U/L — HIGH (ref 4–32)
BASOPHILS # BLD AUTO: 0.02 K/UL — SIGNIFICANT CHANGE UP (ref 0–0.2)
BASOPHILS NFR BLD AUTO: 0.3 % — SIGNIFICANT CHANGE UP (ref 0–2)
BILIRUB SERPL-MCNC: 0.5 MG/DL — SIGNIFICANT CHANGE UP (ref 0.2–1.2)
BUN SERPL-MCNC: 60 MG/DL — HIGH (ref 7–23)
CALCIUM SERPL-MCNC: 8.8 MG/DL — SIGNIFICANT CHANGE UP (ref 8.4–10.5)
CHLORIDE SERPL-SCNC: 104 MMOL/L — SIGNIFICANT CHANGE UP (ref 98–107)
CO2 SERPL-SCNC: 21 MMOL/L — LOW (ref 22–31)
CREAT SERPL-MCNC: 1.54 MG/DL — HIGH (ref 0.5–1.3)
EGFR: 39 ML/MIN/1.73M2 — LOW
EOSINOPHIL # BLD AUTO: 0.62 K/UL — HIGH (ref 0–0.5)
EOSINOPHIL NFR BLD AUTO: 8.8 % — HIGH (ref 0–6)
GLUCOSE BLDC GLUCOMTR-MCNC: 132 MG/DL — HIGH (ref 70–99)
GLUCOSE BLDC GLUCOMTR-MCNC: 152 MG/DL — HIGH (ref 70–99)
GLUCOSE BLDC GLUCOMTR-MCNC: 206 MG/DL — HIGH (ref 70–99)
GLUCOSE BLDC GLUCOMTR-MCNC: 265 MG/DL — HIGH (ref 70–99)
GLUCOSE BLDC GLUCOMTR-MCNC: 277 MG/DL — HIGH (ref 70–99)
GLUCOSE BLDC GLUCOMTR-MCNC: 309 MG/DL — HIGH (ref 70–99)
GLUCOSE SERPL-MCNC: 124 MG/DL — HIGH (ref 70–99)
HCT VFR BLD CALC: 25.1 % — LOW (ref 34.5–45)
HCT VFR BLD CALC: 25.3 % — LOW (ref 34.5–45)
HGB BLD-MCNC: 7.9 G/DL — LOW (ref 11.5–15.5)
HGB BLD-MCNC: 8 G/DL — LOW (ref 11.5–15.5)
IANC: 4.94 K/UL — SIGNIFICANT CHANGE UP (ref 1.8–7.4)
IMM GRANULOCYTES NFR BLD AUTO: 0.9 % — SIGNIFICANT CHANGE UP (ref 0–0.9)
LYMPHOCYTES # BLD AUTO: 0.88 K/UL — LOW (ref 1–3.3)
LYMPHOCYTES # BLD AUTO: 12.5 % — LOW (ref 13–44)
MAGNESIUM SERPL-MCNC: 1.9 MG/DL — SIGNIFICANT CHANGE UP (ref 1.6–2.6)
MCHC RBC-ENTMCNC: 24.6 PG — LOW (ref 27–34)
MCHC RBC-ENTMCNC: 24.8 PG — LOW (ref 27–34)
MCHC RBC-ENTMCNC: 31.5 GM/DL — LOW (ref 32–36)
MCHC RBC-ENTMCNC: 31.6 GM/DL — LOW (ref 32–36)
MCV RBC AUTO: 77.8 FL — LOW (ref 80–100)
MCV RBC AUTO: 78.9 FL — LOW (ref 80–100)
MONOCYTES # BLD AUTO: 0.51 K/UL — SIGNIFICANT CHANGE UP (ref 0–0.9)
MONOCYTES NFR BLD AUTO: 7.3 % — SIGNIFICANT CHANGE UP (ref 2–14)
NEUTROPHILS # BLD AUTO: 4.94 K/UL — SIGNIFICANT CHANGE UP (ref 1.8–7.4)
NEUTROPHILS NFR BLD AUTO: 70.2 % — SIGNIFICANT CHANGE UP (ref 43–77)
NRBC # BLD: 0 /100 WBCS — SIGNIFICANT CHANGE UP (ref 0–0)
NRBC # BLD: 0 /100 WBCS — SIGNIFICANT CHANGE UP (ref 0–0)
NRBC # FLD: 0 K/UL — SIGNIFICANT CHANGE UP (ref 0–0)
NRBC # FLD: 0 K/UL — SIGNIFICANT CHANGE UP (ref 0–0)
PHOSPHATE SERPL-MCNC: 4.2 MG/DL — SIGNIFICANT CHANGE UP (ref 2.5–4.5)
PLATELET # BLD AUTO: 166 K/UL — SIGNIFICANT CHANGE UP (ref 150–400)
PLATELET # BLD AUTO: 174 K/UL — SIGNIFICANT CHANGE UP (ref 150–400)
POTASSIUM SERPL-MCNC: 5 MMOL/L — SIGNIFICANT CHANGE UP (ref 3.5–5.3)
POTASSIUM SERPL-SCNC: 5 MMOL/L — SIGNIFICANT CHANGE UP (ref 3.5–5.3)
PROT SERPL-MCNC: 6.5 G/DL — SIGNIFICANT CHANGE UP (ref 6–8.3)
RBC # BLD: 3.18 M/UL — LOW (ref 3.8–5.2)
RBC # BLD: 3.25 M/UL — LOW (ref 3.8–5.2)
RBC # FLD: 16.5 % — HIGH (ref 10.3–14.5)
RBC # FLD: 16.7 % — HIGH (ref 10.3–14.5)
SODIUM SERPL-SCNC: 136 MMOL/L — SIGNIFICANT CHANGE UP (ref 135–145)
WBC # BLD: 7.03 K/UL — SIGNIFICANT CHANGE UP (ref 3.8–10.5)
WBC # BLD: 7.52 K/UL — SIGNIFICANT CHANGE UP (ref 3.8–10.5)
WBC # FLD AUTO: 7.03 K/UL — SIGNIFICANT CHANGE UP (ref 3.8–10.5)
WBC # FLD AUTO: 7.52 K/UL — SIGNIFICANT CHANGE UP (ref 3.8–10.5)

## 2022-09-30 PROCEDURE — 99233 SBSQ HOSP IP/OBS HIGH 50: CPT

## 2022-09-30 PROCEDURE — 99233 SBSQ HOSP IP/OBS HIGH 50: CPT | Mod: GC

## 2022-09-30 RX ORDER — FUROSEMIDE 40 MG
40 TABLET ORAL ONCE
Refills: 0 | Status: COMPLETED | OUTPATIENT
Start: 2022-09-30 | End: 2022-09-30

## 2022-09-30 RX ADMIN — Medication 1: at 17:11

## 2022-09-30 RX ADMIN — ATORVASTATIN CALCIUM 40 MILLIGRAM(S): 80 TABLET, FILM COATED ORAL at 22:25

## 2022-09-30 RX ADMIN — HEPARIN SODIUM 5000 UNIT(S): 5000 INJECTION INTRAVENOUS; SUBCUTANEOUS at 17:09

## 2022-09-30 RX ADMIN — Medication 100 MILLIGRAM(S): at 22:25

## 2022-09-30 RX ADMIN — Medication 1: at 22:26

## 2022-09-30 RX ADMIN — IRON SUCROSE 210 MILLIGRAM(S): 20 INJECTION, SOLUTION INTRAVENOUS at 23:14

## 2022-09-30 RX ADMIN — PANTOPRAZOLE SODIUM 40 MILLIGRAM(S): 20 TABLET, DELAYED RELEASE ORAL at 05:41

## 2022-09-30 RX ADMIN — Medication 4: at 13:31

## 2022-09-30 RX ADMIN — Medication 0.1 MILLIGRAM(S): at 05:41

## 2022-09-30 RX ADMIN — HEPARIN SODIUM 5000 UNIT(S): 5000 INJECTION INTRAVENOUS; SUBCUTANEOUS at 05:40

## 2022-09-30 RX ADMIN — Medication 50 MILLIGRAM(S): at 17:08

## 2022-09-30 RX ADMIN — Medication 0.1 MILLIGRAM(S): at 22:25

## 2022-09-30 RX ADMIN — Medication 2 UNIT(S): at 08:03

## 2022-09-30 RX ADMIN — Medication 100 MILLIGRAM(S): at 05:39

## 2022-09-30 RX ADMIN — Medication 50 MILLIGRAM(S): at 05:39

## 2022-09-30 RX ADMIN — Medication 2 UNIT(S): at 13:31

## 2022-09-30 RX ADMIN — Medication 50 MICROGRAM(S): at 05:38

## 2022-09-30 RX ADMIN — INSULIN GLARGINE 14 UNIT(S): 100 INJECTION, SOLUTION SUBCUTANEOUS at 08:05

## 2022-09-30 RX ADMIN — Medication 2 UNIT(S): at 19:34

## 2022-09-30 RX ADMIN — AMLODIPINE BESYLATE 10 MILLIGRAM(S): 2.5 TABLET ORAL at 05:41

## 2022-09-30 RX ADMIN — Medication 40 MILLIGRAM(S): at 11:57

## 2022-09-30 NOTE — DIETITIAN INITIAL EVALUATION ADULT - NS FNS DIET ORDER
Diet, DASH/TLC:   Sodium & Cholesterol Restricted  Consistent Carbohydrate {Evening Snack} (CSTCHOSN)  1200mL Fluid Restriction ([id number])  No Beef  No Pork (09-29-22 @ 16:52)

## 2022-09-30 NOTE — PROGRESS NOTE ADULT - PROBLEM SELECTOR PLAN 3
Worsening Scr to 1.7. likely sequelae 2/2 to hypertensive emergency. Cannot r/o underlying CKD  - unknown baseline, elevated at last hospitalization  - pending [facility] records for baseline   - VA Duplex Abdomen/Pelvis pending   - Urine Na and Urine Cr WNL  - U/A unremarkable   - CTM Bun/Cr. Worsening Scr to 1.7. likely sequelae 2/2 to hypertensive emergency. Cannot r/o underlying CKD  - unknown baseline, elevated at last hospitalization  - per [facility] records Creatinine 1.83 at discharge 9/16/22  - VA Duplex Abdomen/Pelvis w/o concern for renal artery stenosis  - Urine Na and Urine Cr WNL  - U/A unremarkable   - CTM Bun/Cr.

## 2022-09-30 NOTE — PROGRESS NOTE ADULT - PROBLEM SELECTOR PLAN 1
Patient with resistant secondary HTN of unclear etiology. Found to have severe pulmonary hypertension on Echo. D/dx for secondary HTN includes renal artery stenosis vs primary pulmonary HTN vs OLIVE, less likely pheochromocytoma given clinical symptoms.    - goal 120s-150s systolic    - c/w home meds w/ hold parameters: amlodipine 10mg qd, labetalol 50mg BID, clonidine 0.2mg BID, hydral 100mg TID  - plan to wean off clonidine as per Cardio recs-->start clonidine 0.1 TID  - VA Duplex Abdomen/Pelvis w/ no significant renal stenosis  - TSH elevated to 8.63, Free T4 WNL  - f/u renin/reji (recent renin/reji at [facility] WNL)  - f/w urine/plasma metanephrines Patient with resistant secondary HTN of unclear etiology. Found to have severe pulmonary hypertension on Echo. D/dx for secondary HTN includes renal artery stenosis vs primary pulmonary HTN vs OLIVE, less likely pheochromocytoma given clinical symptoms.    - goal 120s-150s systolic    - c/w home meds w/ hold parameters: amlodipine 10mg qd, labetalol 50mg BID, clonidine 0.1mg BID, hydral 100mg TID  - plan to wean off clonidine as per Cardio recs--continue clonidine 0.1 TID  - VA Duplex Abdomen/Pelvis w/ no significant renal stenosis  - TSH elevated to 8.63, Free T4 WNL  - f/u renin/reji (recent renin/reji at [facility] WNL)  - f/w urine/plasma metanephrines

## 2022-09-30 NOTE — DIETITIAN INITIAL EVALUATION ADULT - OTHER INFO
57 y.o. F w/ a hx of HTN, DM2, hypothyroidism hospitalized for ADHF 2/2 HTN emergency.     Pt seen and reports 75% intake of meals with No GI distress (nausea/vomiting/diarrhea/constipation.) at present. Current wt- 161.7# (9/29/22)via bed scale. Pt not sure of her UBW. Noted skin intact, no edema per nursing flow sheet. Labs reviewed. A1c- 9.1% (H).

## 2022-09-30 NOTE — DIETITIAN INITIAL EVALUATION ADULT - PERTINENT LABORATORY DATA
09-30    136  |  104  |  60<H>  ----------------------------<  124<H>  5.0   |  21<L>  |  1.54<H>    Ca    8.8      30 Sep 2022 06:20  Phos  4.2     09-30  Mg     1.90     09-30    TPro  6.5  /  Alb  3.8  /  TBili  0.5  /  DBili  x   /  AST  44<H>  /  ALT  155<H>  /  AlkPhos  377<H>  09-30  POCT Blood Glucose.: 277 mg/dL (09-30-22 @ 11:52)  A1C with Estimated Average Glucose Result: 9.1 % (09-29-22 @ 06:00)  A1C with Estimated Average Glucose Result: 9.2 % (09-28-22 @ 07:47)

## 2022-09-30 NOTE — PROGRESS NOTE ADULT - PROBLEM SELECTOR PLAN 6
Hgb to 6.8 this morning, 1 unit of packed RBCs transfused. Unclear etiology, possibly undiagnosed CKD, less likely active bleed as patient is hemodynamically stable and asymptomatic.  - Iron studies WNL  - Iron saturation <20%, plan to administer IV iron sucrose x 5 days   - Hemolysis studies negative   - outpt cancer screening per guideline Hgb to 6.8 9/29, 1 unit of packed RBCs transfused 9/29. Unclear etiology, possibly undiagnosed CKD, less likely active bleed as patient is hemodynamically stable and asymptomatic.  - Iron studies WNL  - Iron saturation <20%, plan to administer IV iron sucrose x 5 days   - Hemolysis studies negative   - outpt cancer screening per guideline

## 2022-09-30 NOTE — PROGRESS NOTE ADULT - ASSESSMENT
56yo F with PMH of HTN, HLD, DM2, anemia presenting w resistant hypertension resulting in SOB c/f flash pulm edema. Now BP well controlled. Patient is still volume overloaded, requiring diuresis. Patient also anemic w/ transfusion 9/29.

## 2022-09-30 NOTE — DIETITIAN INITIAL EVALUATION ADULT - ORAL INTAKE PTA/DIET HISTORY
Pt with good appetite at home, not very compliant to CHO consistent diet. RD provided the patient with extensive verbal and written DM diet education; including, carb counting, label reading, meal planning, pre-prandial and post-prandial finger stick goals, and HbA1c goal. Patient was also made aware of the physiological implications of poor glycemic control. Also discussed Heart Healthy diet recommendations. Importance of having consistent carbohydrate with protein at each meals emphasized.

## 2022-09-30 NOTE — PROGRESS NOTE ADULT - SUBJECTIVE AND OBJECTIVE BOX
Patient seen and examined at bedside.    Overnight Events: No acute events.     Review Of Systems: No chest pain, shortness of breath, or palpitations          Tele sinus 50s.       Current Meds:  amLODIPine   Tablet 10 milliGRAM(s) Oral daily  atorvastatin 40 milliGRAM(s) Oral at bedtime  cloNIDine 0.1 milliGRAM(s) Oral three times a day  dextrose 5%. 1000 milliLiter(s) IV Continuous <Continuous>  dextrose 5%. 1000 milliLiter(s) IV Continuous <Continuous>  dextrose 50% Injectable 25 Gram(s) IV Push once  dextrose 50% Injectable 12.5 Gram(s) IV Push once  dextrose 50% Injectable 25 Gram(s) IV Push once  dextrose Oral Gel 15 Gram(s) Oral once PRN  glucagon  Injectable 1 milliGRAM(s) IntraMuscular once  heparin   Injectable 5000 Unit(s) SubCutaneous every 12 hours  hydrALAZINE 100 milliGRAM(s) Oral three times a day  insulin glargine Injectable (LANTUS) 14 Unit(s) SubCutaneous every morning  insulin lispro (ADMELOG) corrective regimen sliding scale   SubCutaneous three times a day before meals  insulin lispro (ADMELOG) corrective regimen sliding scale   SubCutaneous at bedtime  insulin lispro Injectable (ADMELOG) 2 Unit(s) SubCutaneous three times a day before meals  iron sucrose IVPB 100 milliGRAM(s) IV Intermittent every 24 hours  labetalol 50 milliGRAM(s) Oral two times a day  levothyroxine 50 MICROGram(s) Oral daily  pantoprazole    Tablet 40 milliGRAM(s) Oral before breakfast      Vitals:  T(F): 97.4 (09-30), Max: 98.4 (09-29)  HR: 60 (09-30) (50 - 60)  BP: 146/65 (09-30) (116/56 - 151/71)  RR: 20 (09-30)  SpO2: 100% (09-30)  I&O's Summary    29 Sep 2022 07:01  -  30 Sep 2022 07:00  --------------------------------------------------------  IN: 0 mL / OUT: 900 mL / NET: -900 mL        Physical Exam:  GENERAL: No acute distress, well-developed  HEAD:  Atraumatic, Normocephalic  CHEST/LUNG: crackles at the bases bilaterally   HEART: RRR, S1 S2 nl, no m/r/g  ABDOMEN: Soft, Nontender, Nondistended; Bowel sounds present  EXTREMITIES: trace edema   PSYCH: Nl behavior, nl affect  NEUROLOGY: AAOx3, non-focal, cranial nerves intact  SKIN: Normal color, No rashes or lesions                          8.0    7.52  )-----------( 174      ( 30 Sep 2022 06:20 )             25.3     09-30    136  |  104  |  60<H>  ----------------------------<  124<H>  5.0   |  21<L>  |  1.54<H>    Ca    8.8      30 Sep 2022 06:20  Phos  4.2     09-30  Mg     1.90     09-30    TPro  6.5  /  Alb  3.8  /  TBili  0.5  /  DBili  x   /  AST  44<H>  /  ALT  155<H>  /  AlkPhos  377<H>  09-30      CARDIAC MARKERS ( 28 Sep 2022 01:57 )  26 ng/L / x     / x     / x     / x     / x      CARDIAC MARKERS ( 27 Sep 2022 23:50 )  26 ng/L / x     / x     / x     / x     / x          Serum Pro-Brain Natriuretic Peptide: 6177 pg/mL (09-27 @ 23:50)

## 2022-09-30 NOTE — PROGRESS NOTE ADULT - PROBLEM SELECTOR PLAN 4
Per pt takes 20 of levemir depending on FSGs at home. Missed night of admission  - Increasing FSGs over past 24 hours   - 14u lantus, 2 units pre-meal ordered   - c/w low dose sliding scale  - ctm blood sugars for adjustment

## 2022-09-30 NOTE — PROGRESS NOTE ADULT - ASSESSMENT
56yo F with PMH of HTN, HLD, DM2, anemia presents to ED for progressive and severe resp distress, with hypoxemic respiratory failure and symptoms of heart failure exacerbation.    1. CHF  - Lasix 40mg IV daily, goal net negative 1-2L over 24hrs  - Monitor on Telemetry  - Keep K > 4, Mag > 2  - TTE 9/28 with nl LVEF, mildly dilated LA, severe pHTN (RVSP 68mmHg)  - Will repeat TTE to reassess pulmonary pressures once more euvolemic     2. HTN  - Continue labetalol, hydral/isordil for afterload control. Prioritize weaning off clonidine  - Renal ultrasound with arterial duplex   - Consider measurements of urinary and plasma fractionated metanephrines, renin-reji    Gavin Sandoval MD  Cardiology Fellow - PGY 5

## 2022-09-30 NOTE — PROGRESS NOTE ADULT - SUBJECTIVE AND OBJECTIVE BOX
Walter Kincaid, PGY2    DATE OF SERVICE: 22 @ 07:06    Patient is a 57y old  Female who presents with a chief complaint of shortness of breath (29 Sep 2022 10:15)      SUBJECTIVE / OVERNIGHT EVENTS: Post-transfusion CBC with appropriate response. Hgb 6.8->7.9. No acute events overnight. Patient seen and examined this AM.     MEDICATIONS  (STANDING):  amLODIPine   Tablet 10 milliGRAM(s) Oral daily  atorvastatin 40 milliGRAM(s) Oral at bedtime  cloNIDine 0.1 milliGRAM(s) Oral three times a day  dextrose 5%. 1000 milliLiter(s) (100 mL/Hr) IV Continuous <Continuous>  dextrose 5%. 1000 milliLiter(s) (50 mL/Hr) IV Continuous <Continuous>  dextrose 50% Injectable 25 Gram(s) IV Push once  dextrose 50% Injectable 12.5 Gram(s) IV Push once  dextrose 50% Injectable 25 Gram(s) IV Push once  glucagon  Injectable 1 milliGRAM(s) IntraMuscular once  heparin   Injectable 5000 Unit(s) SubCutaneous every 12 hours  hydrALAZINE 100 milliGRAM(s) Oral three times a day  insulin glargine Injectable (LANTUS) 14 Unit(s) SubCutaneous every morning  insulin lispro (ADMELOG) corrective regimen sliding scale   SubCutaneous three times a day before meals  insulin lispro (ADMELOG) corrective regimen sliding scale   SubCutaneous at bedtime  insulin lispro Injectable (ADMELOG) 2 Unit(s) SubCutaneous three times a day before meals  iron sucrose IVPB 100 milliGRAM(s) IV Intermittent every 24 hours  labetalol 50 milliGRAM(s) Oral two times a day  levothyroxine 50 MICROGram(s) Oral daily  pantoprazole    Tablet 40 milliGRAM(s) Oral before breakfast    MEDICATIONS  (PRN):  dextrose Oral Gel 15 Gram(s) Oral once PRN Blood Glucose LESS THAN 70 milliGRAM(s)/deciliter      Vital Signs Last 24 Hrs  T(C): 36.3 (30 Sep 2022 05:30), Max: 36.9 (29 Sep 2022 12:42)  T(F): 97.4 (30 Sep 2022 05:30), Max: 98.4 (29 Sep 2022 12:42)  HR: 60 (30 Sep 2022 05:30) (50 - 60)  BP: 146/65 (30 Sep 2022 05:30) (116/56 - 151/71)  BP(mean): --  RR: 20 (30 Sep 2022 05:30) (17 - 20)  SpO2: 100% (30 Sep 2022 05:30) (100% - 100%)    Parameters below as of 30 Sep 2022 05:30  Patient On (Oxygen Delivery Method): room air      CAPILLARY BLOOD GLUCOSE      POCT Blood Glucose.: 262 mg/dL (29 Sep 2022 21:18)  POCT Blood Glucose.: 260 mg/dL (29 Sep 2022 16:59)  POCT Blood Glucose.: 284 mg/dL (29 Sep 2022 12:02)  POCT Blood Glucose.: 139 mg/dL (29 Sep 2022 07:56)    I&O's Summary    29 Sep 2022 07:01  -  30 Sep 2022 07:00  --------------------------------------------------------  IN: 0 mL / OUT: 900 mL / NET: -900 mL        PHYSICAL EXAM:**********************************  GENERAL: NAD, well-developed  HEAD:  Atraumatic, Normocephalic  EYES: EOMI, PERRLA, conjunctiva and sclera clear  NECK: Supple, No JVD  CHEST/LUNG: Clear to auscultation bilaterally; No wheeze  HEART: Regular rate and rhythm; No murmurs, rubs, or gallops  ABDOMEN: Soft, Nontender, Nondistended; Bowel sounds present  EXTREMITIES:  2+ Peripheral Pulses, No clubbing, cyanosis, or edema  PSYCH: AAOx3  NEUROLOGY: non-focal  SKIN: No rashes or lesions    LABS:                        7.9    7.03  )-----------( 166      ( 30 Sep 2022 00:35 )             25.1     -    139  |  106  |  57<H>  ----------------------------<  130<H>  4.9   |  22  |  1.70<H>    Ca    9.3      29 Sep 2022 06:00  Phos  3.8       Mg     2.00         TPro  6.1  /  Alb  3.8  /  TBili  0.3  /  DBili  x   /  AST  73<H>  /  ALT  194<H>  /  AlkPhos  381<H>            Urinalysis Basic - ( 28 Sep 2022 07:43 )    Color: Light Yellow / Appearance: Clear / S.014 / pH: x  Gluc: x / Ketone: Negative  / Bili: Negative / Urobili: <2 mg/dL   Blood: x / Protein: 300 mg/dL / Nitrite: Negative   Leuk Esterase: Negative / RBC: 4 /HPF / WBC 1 /HPF   Sq Epi: x / Non Sq Epi: 1 /HPF / Bacteria: Negative        RADIOLOGY & ADDITIONAL TESTS:    Imaging Personally Reviewed:    Consultant(s) Notes Reviewed:      Care Discussed with Consultants/Other Providers:   Walter Kincaid, PGY2    DATE OF SERVICE: 22 @ 07:06    Patient is a 57y old  Female who presents with a chief complaint of shortness of breath (29 Sep 2022 10:15)      SUBJECTIVE / OVERNIGHT EVENTS: Post-transfusion CBC with appropriate response. Hgb 6.8->7.9. No acute events overnight. Patient seen and examined this AM. Doing well. Denies chest pain, dyspnea, abd pain, n/v. Asking about when going home, but informed she still has excess fluid that needs to be removed.     MEDICATIONS  (STANDING):  amLODIPine   Tablet 10 milliGRAM(s) Oral daily  atorvastatin 40 milliGRAM(s) Oral at bedtime  cloNIDine 0.1 milliGRAM(s) Oral three times a day  dextrose 5%. 1000 milliLiter(s) (100 mL/Hr) IV Continuous <Continuous>  dextrose 5%. 1000 milliLiter(s) (50 mL/Hr) IV Continuous <Continuous>  dextrose 50% Injectable 25 Gram(s) IV Push once  dextrose 50% Injectable 12.5 Gram(s) IV Push once  dextrose 50% Injectable 25 Gram(s) IV Push once  glucagon  Injectable 1 milliGRAM(s) IntraMuscular once  heparin   Injectable 5000 Unit(s) SubCutaneous every 12 hours  hydrALAZINE 100 milliGRAM(s) Oral three times a day  insulin glargine Injectable (LANTUS) 14 Unit(s) SubCutaneous every morning  insulin lispro (ADMELOG) corrective regimen sliding scale   SubCutaneous three times a day before meals  insulin lispro (ADMELOG) corrective regimen sliding scale   SubCutaneous at bedtime  insulin lispro Injectable (ADMELOG) 2 Unit(s) SubCutaneous three times a day before meals  iron sucrose IVPB 100 milliGRAM(s) IV Intermittent every 24 hours  labetalol 50 milliGRAM(s) Oral two times a day  levothyroxine 50 MICROGram(s) Oral daily  pantoprazole    Tablet 40 milliGRAM(s) Oral before breakfast    MEDICATIONS  (PRN):  dextrose Oral Gel 15 Gram(s) Oral once PRN Blood Glucose LESS THAN 70 milliGRAM(s)/deciliter      Vital Signs Last 24 Hrs  T(C): 36.3 (30 Sep 2022 05:30), Max: 36.9 (29 Sep 2022 12:42)  T(F): 97.4 (30 Sep 2022 05:30), Max: 98.4 (29 Sep 2022 12:42)  HR: 60 (30 Sep 2022 05:30) (50 - 60)  BP: 146/65 (30 Sep 2022 05:30) (116/56 - 151/71)  BP(mean): --  RR: 20 (30 Sep 2022 05:30) (17 - 20)  SpO2: 100% (30 Sep 2022 05:30) (100% - 100%)    Parameters below as of 30 Sep 2022 05:30  Patient On (Oxygen Delivery Method): room air      CAPILLARY BLOOD GLUCOSE      POCT Blood Glucose.: 262 mg/dL (29 Sep 2022 21:18)  POCT Blood Glucose.: 260 mg/dL (29 Sep 2022 16:59)  POCT Blood Glucose.: 284 mg/dL (29 Sep 2022 12:02)  POCT Blood Glucose.: 139 mg/dL (29 Sep 2022 07:56)    I&O's Summary    29 Sep 2022 07:01  -  30 Sep 2022 07:00  --------------------------------------------------------  IN: 0 mL / OUT: 900 mL / NET: -900 mL        PHYSICAL EXAM:  GENERAL: NAD, well-developed  HEAD:  Atraumatic, Normocephalic  EYES: EOMI, PERRLA, conjunctiva, and anicteric sclera  NECK: Supple, JVD mid neck  CHEST/LUNG: Clear to auscultation bilaterally; No wheeze  HEART: Normal rate and regular rhythm; No murmurs, rubs, or gallops  ABDOMEN: Soft, Nontender, Nondistended; Bowel sounds present  EXTREMITIES:  2+ Peripheral Pulses, 2+ bilateral lower extremity edema  PSYCH: AAOx3  NEUROLOGY: non-focal  SKIN: No rashes or lesions    LABS:                        7.9    7.03  )-----------( 166      ( 30 Sep 2022 00:35 )             25.1     -    139  |  106  |  57<H>  ----------------------------<  130<H>  4.9   |  22  |  1.70<H>    Ca    9.3      29 Sep 2022 06:00  Phos  3.8       Mg     2.00         TPro  6.1  /  Alb  3.8  /  TBili  0.3  /  DBili  x   /  AST  73<H>  /  ALT  194<H>  /  AlkPhos  381<H>            Urinalysis Basic - ( 28 Sep 2022 07:43 )    Color: Light Yellow / Appearance: Clear / S.014 / pH: x  Gluc: x / Ketone: Negative  / Bili: Negative / Urobili: <2 mg/dL   Blood: x / Protein: 300 mg/dL / Nitrite: Negative   Leuk Esterase: Negative / RBC: 4 /HPF / WBC 1 /HPF   Sq Epi: x / Non Sq Epi: 1 /HPF / Bacteria: Negative        RADIOLOGY & ADDITIONAL TESTS:    Imaging Personally Reviewed:    Consultant(s) Notes Reviewed:      Care Discussed with Consultants/Other Providers:

## 2022-09-30 NOTE — PROGRESS NOTE ADULT - ATTENDING COMMENTS
57 y.o. F w/ a hx of HTN, DM2, hypothyroidism hospitalized for ADHF 2/2 HTN emergency.     Patient feels well, reports poor compliance with some medications 2/2 inconvenient timing and concern they are lowering her BP too much. Stressed importance of compliance and adjustment by one outpatient provider. PE: 1+ LE edema. Renal Doppler negative.     # HTN emergency c/b ADHF: BP improved. Lower clonidine to .1mg TID, cont home meds. Follow up renin/reji, plasma metanephrines.   # ISAMAR: Cr 1.2 at [facility], improving here.   # DM2: Cont Lantus 14 units + Lispro 2 w/ meals+ SSI  # Transaminitis: Suspect hepatic congestion, improving with diuresis, continue.   # Hypothyroidism: TSH elevated 8.63- pt reports taking it in AM and takes a PPI soon after. Discussed importance of taking Synthroid on it's own on an empty stomach.   # Microcytic anemia: Cont iron repletion. Follow up OP GI for colonoscopy. 57 y.o. F w/ a hx of HTN, DM2, hypothyroidism hospitalized for ADHF 2/2 HTN emergency.     Patient feels well, reports poor compliance with some medications 2/2 inconvenient timing and concern they are lowering her BP too much. Stressed importance of compliance and adjustment by one outpatient provider. PE: 1+ LE edema. Renal Doppler negative.     # HTN emergency c/b ADHF: BP improved. Lower clonidine to .1mg TID, cont home meds. Follow up renin/reji, plasma metanephrines.   # ISAMAR: Unknown baseline, Cr improving here.   # DM2: Cont Lantus 14 units + Lispro 2 w/ meals+ SSI  # Transaminitis: Suspect hepatic congestion, improving with diuresis, continue.   # Hypothyroidism: TSH elevated 8.63- pt reports taking it in AM and takes a PPI soon after. Discussed importance of taking Synthroid on it's own on an empty stomach.   # Microcytic anemia: Cont iron repletion. Follow up OP GI for colonoscopy.

## 2022-09-30 NOTE — PROGRESS NOTE ADULT - PROBLEM SELECTOR PLAN 5
IMPROVING  Elevated liver enzymes on admission likely iso of acute decompensated HF, now improving likely 2/2 improved volume status  - RUQ: unremarkable  - ctm LFTs

## 2022-09-30 NOTE — PROGRESS NOTE ADULT - PROBLEM SELECTOR PLAN 2
Acute decompensated heart failure.   On admission, probnp. Echo: EF: 64%, elevated right sided pressures consistent with severe pulmonary hypertension, but no LV dysfunction   - d/c BIPAP O/N  - s/p IV lasix 20 x 3 doses, reassess for symptoms and evaluate if patient requires additional prns  - Cards: repeat Echo when euvolemic to reassess pulmonary hypertension as patient is fluid overloaded   - Strict I's and O's  - Goal of Mg >2, K >4. Acute decompensated heart failure.   On admission, probnp. Echo: EF: 64%, elevated right sided pressures consistent with severe pulmonary hypertension, but no LV dysfunction   - continue diuresis w/ 40mg IV lasix 9/30  - Cards: repeat Echo when euvolemic to reassess pulmonary hypertension as patient is fluid overloaded   - Strict I's and O's  - Goal of Mg >2, K >4.

## 2022-09-30 NOTE — DIETITIAN INITIAL EVALUATION ADULT - PERTINENT MEDS FT
MEDICATIONS  (STANDING):  amLODIPine   Tablet 10 milliGRAM(s) Oral daily  atorvastatin 40 milliGRAM(s) Oral at bedtime  cloNIDine 0.1 milliGRAM(s) Oral three times a day  dextrose 5%. 1000 milliLiter(s) (100 mL/Hr) IV Continuous <Continuous>  dextrose 5%. 1000 milliLiter(s) (50 mL/Hr) IV Continuous <Continuous>  dextrose 50% Injectable 25 Gram(s) IV Push once  dextrose 50% Injectable 12.5 Gram(s) IV Push once  dextrose 50% Injectable 25 Gram(s) IV Push once  glucagon  Injectable 1 milliGRAM(s) IntraMuscular once  heparin   Injectable 5000 Unit(s) SubCutaneous every 12 hours  hydrALAZINE 100 milliGRAM(s) Oral three times a day  insulin glargine Injectable (LANTUS) 14 Unit(s) SubCutaneous every morning  insulin lispro (ADMELOG) corrective regimen sliding scale   SubCutaneous three times a day before meals  insulin lispro (ADMELOG) corrective regimen sliding scale   SubCutaneous at bedtime  insulin lispro Injectable (ADMELOG) 2 Unit(s) SubCutaneous three times a day before meals  iron sucrose IVPB 100 milliGRAM(s) IV Intermittent every 24 hours  labetalol 50 milliGRAM(s) Oral two times a day  levothyroxine 50 MICROGram(s) Oral daily  pantoprazole    Tablet 40 milliGRAM(s) Oral before breakfast    MEDICATIONS  (PRN):  dextrose Oral Gel 15 Gram(s) Oral once PRN Blood Glucose LESS THAN 70 milliGRAM(s)/deciliter

## 2022-10-01 LAB
ALBUMIN SERPL ELPH-MCNC: 3.7 G/DL — SIGNIFICANT CHANGE UP (ref 3.3–5)
ALP SERPL-CCNC: 367 U/L — HIGH (ref 40–120)
ALT FLD-CCNC: 118 U/L — HIGH (ref 4–33)
ANION GAP SERPL CALC-SCNC: 12 MMOL/L — SIGNIFICANT CHANGE UP (ref 7–14)
AST SERPL-CCNC: 34 U/L — HIGH (ref 4–32)
BILIRUB SERPL-MCNC: 0.3 MG/DL — SIGNIFICANT CHANGE UP (ref 0.2–1.2)
BUN SERPL-MCNC: 65 MG/DL — HIGH (ref 7–23)
CALCIUM SERPL-MCNC: 8.5 MG/DL — SIGNIFICANT CHANGE UP (ref 8.4–10.5)
CHLORIDE SERPL-SCNC: 103 MMOL/L — SIGNIFICANT CHANGE UP (ref 98–107)
CO2 SERPL-SCNC: 20 MMOL/L — LOW (ref 22–31)
CREAT SERPL-MCNC: 1.65 MG/DL — HIGH (ref 0.5–1.3)
EGFR: 36 ML/MIN/1.73M2 — LOW
GLUCOSE BLDC GLUCOMTR-MCNC: 147 MG/DL — HIGH (ref 70–99)
GLUCOSE BLDC GLUCOMTR-MCNC: 175 MG/DL — HIGH (ref 70–99)
GLUCOSE BLDC GLUCOMTR-MCNC: 181 MG/DL — HIGH (ref 70–99)
GLUCOSE BLDC GLUCOMTR-MCNC: 247 MG/DL — HIGH (ref 70–99)
GLUCOSE SERPL-MCNC: 147 MG/DL — HIGH (ref 70–99)
HCT VFR BLD CALC: 23.9 % — LOW (ref 34.5–45)
HGB BLD-MCNC: 7.7 G/DL — LOW (ref 11.5–15.5)
MAGNESIUM SERPL-MCNC: 1.8 MG/DL — SIGNIFICANT CHANGE UP (ref 1.6–2.6)
MCHC RBC-ENTMCNC: 24.9 PG — LOW (ref 27–34)
MCHC RBC-ENTMCNC: 32.2 GM/DL — SIGNIFICANT CHANGE UP (ref 32–36)
MCV RBC AUTO: 77.3 FL — LOW (ref 80–100)
NRBC # BLD: 0 /100 WBCS — SIGNIFICANT CHANGE UP (ref 0–0)
NRBC # FLD: 0 K/UL — SIGNIFICANT CHANGE UP (ref 0–0)
PHOSPHATE SERPL-MCNC: 4.2 MG/DL — SIGNIFICANT CHANGE UP (ref 2.5–4.5)
PLATELET # BLD AUTO: 167 K/UL — SIGNIFICANT CHANGE UP (ref 150–400)
POTASSIUM SERPL-MCNC: 4.8 MMOL/L — SIGNIFICANT CHANGE UP (ref 3.5–5.3)
POTASSIUM SERPL-SCNC: 4.8 MMOL/L — SIGNIFICANT CHANGE UP (ref 3.5–5.3)
PROT SERPL-MCNC: 6.2 G/DL — SIGNIFICANT CHANGE UP (ref 6–8.3)
RBC # BLD: 3.09 M/UL — LOW (ref 3.8–5.2)
RBC # FLD: 16.7 % — HIGH (ref 10.3–14.5)
SODIUM SERPL-SCNC: 135 MMOL/L — SIGNIFICANT CHANGE UP (ref 135–145)
WBC # BLD: 7.19 K/UL — SIGNIFICANT CHANGE UP (ref 3.8–10.5)
WBC # FLD AUTO: 7.19 K/UL — SIGNIFICANT CHANGE UP (ref 3.8–10.5)

## 2022-10-01 PROCEDURE — 99232 SBSQ HOSP IP/OBS MODERATE 35: CPT | Mod: GC

## 2022-10-01 RX ORDER — INSULIN LISPRO 100/ML
4 VIAL (ML) SUBCUTANEOUS
Refills: 0 | Status: DISCONTINUED | OUTPATIENT
Start: 2022-10-01 | End: 2022-10-03

## 2022-10-01 RX ORDER — FUROSEMIDE 40 MG
80 TABLET ORAL DAILY
Refills: 0 | Status: DISCONTINUED | OUTPATIENT
Start: 2022-10-01 | End: 2022-10-02

## 2022-10-01 RX ADMIN — Medication 100 MILLIGRAM(S): at 05:58

## 2022-10-01 RX ADMIN — AMLODIPINE BESYLATE 10 MILLIGRAM(S): 2.5 TABLET ORAL at 05:56

## 2022-10-01 RX ADMIN — Medication 1: at 12:19

## 2022-10-01 RX ADMIN — Medication 50 MILLIGRAM(S): at 05:55

## 2022-10-01 RX ADMIN — Medication 4 UNIT(S): at 18:03

## 2022-10-01 RX ADMIN — Medication 100 MILLIGRAM(S): at 16:08

## 2022-10-01 RX ADMIN — Medication 0.1 MILLIGRAM(S): at 05:57

## 2022-10-01 RX ADMIN — Medication 0.1 MILLIGRAM(S): at 21:03

## 2022-10-01 RX ADMIN — Medication 2 UNIT(S): at 08:35

## 2022-10-01 RX ADMIN — HEPARIN SODIUM 5000 UNIT(S): 5000 INJECTION INTRAVENOUS; SUBCUTANEOUS at 18:01

## 2022-10-01 RX ADMIN — Medication 0.1 MILLIGRAM(S): at 16:09

## 2022-10-01 RX ADMIN — HEPARIN SODIUM 5000 UNIT(S): 5000 INJECTION INTRAVENOUS; SUBCUTANEOUS at 05:55

## 2022-10-01 RX ADMIN — IRON SUCROSE 210 MILLIGRAM(S): 20 INJECTION, SOLUTION INTRAVENOUS at 21:02

## 2022-10-01 RX ADMIN — ATORVASTATIN CALCIUM 40 MILLIGRAM(S): 80 TABLET, FILM COATED ORAL at 21:03

## 2022-10-01 RX ADMIN — INSULIN GLARGINE 14 UNIT(S): 100 INJECTION, SOLUTION SUBCUTANEOUS at 08:36

## 2022-10-01 RX ADMIN — Medication 100 MILLIGRAM(S): at 21:04

## 2022-10-01 RX ADMIN — Medication 3: at 18:01

## 2022-10-01 RX ADMIN — PANTOPRAZOLE SODIUM 40 MILLIGRAM(S): 20 TABLET, DELAYED RELEASE ORAL at 08:34

## 2022-10-01 RX ADMIN — Medication 50 MICROGRAM(S): at 05:55

## 2022-10-01 RX ADMIN — Medication 50 MILLIGRAM(S): at 18:01

## 2022-10-01 RX ADMIN — Medication 80 MILLIGRAM(S): at 14:58

## 2022-10-01 NOTE — PROGRESS NOTE ADULT - ASSESSMENT
58yo F with PMH of HTN, HLD, DM2, anemia presenting w resistant hypertension resulting in SOB c/f flash pulm edema. Now BP well controlled. Patient is still volume overloaded, requiring diuresis. Patient also anemic w/ transfusion 9/29.  56yo F with PMH of HTN, HLD, DM2, anemia presenting w resistant hypertension resulting in SOB c/f flash pulm edema. Now BP well controlled. Patient is still volume overloaded, requiring diuresis. Patient also anemic w/ transfusion 9/29. Increased lasxi to 80 mg IV daily on 10/1 and also increased lispro from 2u to 4u premeal.

## 2022-10-01 NOTE — PROGRESS NOTE ADULT - PROBLEM SELECTOR PLAN 2
Acute decompensated heart failure.   On admission, probnp. Echo: EF: 64%, elevated right sided pressures consistent with severe pulmonary hypertension, but no LV dysfunction   - continue diuresis w/ 40mg IV lasix 9/30  - Cards: repeat Echo when euvolemic to reassess pulmonary hypertension as patient is fluid overloaded   - Strict I's and O's  - Goal of Mg >2, K >4. Acute decompensated heart failure.   On admission, probnp. Echo: EF: 64%, elevated right sided pressures consistent with severe pulmonary hypertension, but no LV dysfunction   - s/p diuresis w/ 40mg IV lasix 9/30 --> increased to 80 mg IV lasix on 10/1 daily  - Cards: repeat Echo when euvolemic to reassess pulmonary hypertension as patient is fluid overloaded   - Strict I's and O's  - Goal of Mg >2, K >4.

## 2022-10-01 NOTE — PROGRESS NOTE ADULT - PROBLEM SELECTOR PLAN 6
Hgb to 6.8 9/29, 1 unit of packed RBCs transfused 9/29. Unclear etiology, possibly undiagnosed CKD, less likely active bleed as patient is hemodynamically stable and asymptomatic.  - Iron studies WNL  - Iron saturation <20%, plan to administer IV iron sucrose x 5 days   - Hemolysis studies negative   - outpt cancer screening per guideline

## 2022-10-01 NOTE — PROGRESS NOTE ADULT - ATTENDING COMMENTS
57 y.o. F w/ a hx of HTN, DM2, hypothyroidism hospitalized for ADHF 2/2 HTN emergency.     Patient feels well, is concerned that her SBP is in the 120's. PE: 2+ LE edema. Inaccurate I/O's, no weight.     # HTN emergency c/b ADHF: BP improved. Lower clonidine to .1mg TID, cont home meds. Follow up renin/reji, plasma metanephrines. Will diurese with Lasix 80 today as patient's edema appears mildly worse. Will need strict I/O's and weight.   # ISAMAR: Unknown baseline, Cr mildly worse today.   # DM2: Cont Lantus 14 units + increase Lispro 4 w/ meals+ SSI  # Transaminitis: Suspect hepatic congestion, improving with diuresis, continue.   # Hypothyroidism: TSH elevated 8.63- pt reports taking it in AM and takes a PPI soon after. Discussed importance of taking Synthroid on it's own on an empty stomach.   # Microcytic anemia: Cont iron repletion. Follow up OP GI for colonoscopy.

## 2022-10-01 NOTE — PROGRESS NOTE ADULT - PROBLEM SELECTOR PLAN 3
Worsening Scr to 1.7. likely sequelae 2/2 to hypertensive emergency. Cannot r/o underlying CKD  - unknown baseline, elevated at last hospitalization  - per [facility] records Creatinine 1.83 at discharge 9/16/22  - VA Duplex Abdomen/Pelvis w/o concern for renal artery stenosis  - Urine Na and Urine Cr WNL  - U/A unremarkable   - CTM Bun/Cr.

## 2022-10-01 NOTE — PROGRESS NOTE ADULT - PROBLEM SELECTOR PLAN 1
Patient with resistant secondary HTN of unclear etiology. Found to have severe pulmonary hypertension on Echo. D/dx for secondary HTN includes renal artery stenosis vs primary pulmonary HTN vs OLIVE, less likely pheochromocytoma given clinical symptoms.    - goal 120s-150s systolic    - c/w home meds w/ hold parameters: amlodipine 10mg qd, labetalol 50mg BID, clonidine 0.1mg BID, hydral 100mg TID  - plan to wean off clonidine as per Cardio recs--continue clonidine 0.1 TID  - VA Duplex Abdomen/Pelvis w/ no significant renal stenosis  - TSH elevated to 8.63, Free T4 WNL  - f/u renin/reji (recent renin/reji at [facility] WNL)  - f/w urine/plasma metanephrines

## 2022-10-01 NOTE — PROGRESS NOTE ADULT - SUBJECTIVE AND OBJECTIVE BOX
PROGRESS NOTE:   Authored by Dr. Isabella Lawton MD (PGY-1). Pager [facility] [phone] / [facility]     Patient is a 57y old  Female who presents with a chief complaint of Hypertensive emergency     (30 Sep 2022 12:07)      SUBJECTIVE / OVERNIGHT EVENTS:  No acute events overnight.     ADDITIONAL REVIEW OF SYSTEMS:  Patient denies fevers, chills, chest pain, shortness of breath, nausea, abdominal pain, diarrhea, constipation, dysuria, leg swelling, headache, light headedness.    MEDICATIONS  (STANDING):  amLODIPine   Tablet 10 milliGRAM(s) Oral daily  atorvastatin 40 milliGRAM(s) Oral at bedtime  cloNIDine 0.1 milliGRAM(s) Oral three times a day  dextrose 5%. 1000 milliLiter(s) (100 mL/Hr) IV Continuous <Continuous>  dextrose 5%. 1000 milliLiter(s) (50 mL/Hr) IV Continuous <Continuous>  dextrose 50% Injectable 25 Gram(s) IV Push once  dextrose 50% Injectable 12.5 Gram(s) IV Push once  dextrose 50% Injectable 25 Gram(s) IV Push once  glucagon  Injectable 1 milliGRAM(s) IntraMuscular once  heparin   Injectable 5000 Unit(s) SubCutaneous every 12 hours  hydrALAZINE 100 milliGRAM(s) Oral three times a day  insulin glargine Injectable (LANTUS) 14 Unit(s) SubCutaneous every morning  insulin lispro (ADMELOG) corrective regimen sliding scale   SubCutaneous three times a day before meals  insulin lispro (ADMELOG) corrective regimen sliding scale   SubCutaneous at bedtime  insulin lispro Injectable (ADMELOG) 2 Unit(s) SubCutaneous three times a day before meals  iron sucrose IVPB 100 milliGRAM(s) IV Intermittent every 24 hours  labetalol 50 milliGRAM(s) Oral two times a day  levothyroxine 50 MICROGram(s) Oral daily  pantoprazole    Tablet 40 milliGRAM(s) Oral before breakfast    MEDICATIONS  (PRN):  dextrose Oral Gel 15 Gram(s) Oral once PRN Blood Glucose LESS THAN 70 milliGRAM(s)/deciliter      CAPILLARY BLOOD GLUCOSE      POCT Blood Glucose.: 147 mg/dL (01 Oct 2022 07:33)  POCT Blood Glucose.: 265 mg/dL (30 Sep 2022 21:34)  POCT Blood Glucose.: 206 mg/dL (30 Sep 2022 19:32)  POCT Blood Glucose.: 152 mg/dL (30 Sep 2022 16:43)  POCT Blood Glucose.: 309 mg/dL (30 Sep 2022 13:26)  POCT Blood Glucose.: 277 mg/dL (30 Sep 2022 11:52)  POCT Blood Glucose.: 132 mg/dL (30 Sep 2022 07:54)    I&O's Summary      PHYSICAL EXAM:  Vital Signs Last 24 Hrs  T(C): 36.5 (01 Oct 2022 05:45), Max: 36.7 (30 Sep 2022 23:10)  T(F): 97.7 (01 Oct 2022 05:45), Max: 98 (30 Sep 2022 23:10)  HR: 59 (01 Oct 2022 05:45) (53 - 59)  BP: 145/70 (01 Oct 2022 05:45) (119/54 - 150/68)  BP(mean): --  RR: 18 (01 Oct 2022 05:45) (18 - 19)  SpO2: 100% (01 Oct 2022 05:45) (98% - 100%)    Parameters below as of 01 Oct 2022 05:45  Patient On (Oxygen Delivery Method): room air        CONSTITUTIONAL: NAD, well-developed  RESPIRATORY: Normal respiratory effort; lungs are clear to auscultation bilaterally  CARDIOVASCULAR: Regular rate and rhythm, normal S1 and S2, no murmur/rub/gallop; No lower extremity edema; Peripheral pulses are 2+ bilaterally  ABDOMEN: Nontender to palpation, normoactive bowel sounds, no rebound/guarding; No hepatosplenomegaly  MUSCLOSKELETAL: no clubbing or cyanosis of digits; no joint swelling or tenderness to palpation  PSYCH: A+O to person, place, and time; affect appropriate    LABS:                        8.0    7.52  )-----------( 174      ( 30 Sep 2022 06:20 )             25.3     09-30    136  |  104  |  60<H>  ----------------------------<  124<H>  5.0   |  21<L>  |  1.54<H>    Ca    8.8      30 Sep 2022 06:20  Phos  4.2     09-30  Mg     1.90     09-30    TPro  6.5  /  Alb  3.8  /  TBili  0.5  /  DBili  x   /  AST  44<H>  /  ALT  155<H>  /  AlkPhos  377<H>  09-30                Tele Reviewed:    RADIOLOGY & ADDITIONAL TESTS:  Results Reviewed:   Imaging Personally Reviewed:  Electrocardiogram Personally Reviewed:     PROGRESS NOTE:   Authored by Dr. Isabella Lawton MD (PGY-1). Pager [facility] [phone] / [facility]     Patient is a 57y old  Female who presents with a chief complaint of Hypertensive emergency  No complaints overnight. Pt does report edema in lower extremities.    (30 Sep 2022 12:07)      SUBJECTIVE / OVERNIGHT EVENTS:  No acute events overnight.     ADDITIONAL REVIEW OF SYSTEMS:  Patient denies fevers, chills, chest pain, shortness of breath, nausea, abdominal pain, diarrhea, constipation, dysuria, headache, light headedness.    MEDICATIONS  (STANDING):  amLODIPine   Tablet 10 milliGRAM(s) Oral daily  atorvastatin 40 milliGRAM(s) Oral at bedtime  cloNIDine 0.1 milliGRAM(s) Oral three times a day  dextrose 5%. 1000 milliLiter(s) (100 mL/Hr) IV Continuous <Continuous>  dextrose 5%. 1000 milliLiter(s) (50 mL/Hr) IV Continuous <Continuous>  dextrose 50% Injectable 25 Gram(s) IV Push once  dextrose 50% Injectable 12.5 Gram(s) IV Push once  dextrose 50% Injectable 25 Gram(s) IV Push once  glucagon  Injectable 1 milliGRAM(s) IntraMuscular once  heparin   Injectable 5000 Unit(s) SubCutaneous every 12 hours  hydrALAZINE 100 milliGRAM(s) Oral three times a day  insulin glargine Injectable (LANTUS) 14 Unit(s) SubCutaneous every morning  insulin lispro (ADMELOG) corrective regimen sliding scale   SubCutaneous three times a day before meals  insulin lispro (ADMELOG) corrective regimen sliding scale   SubCutaneous at bedtime  insulin lispro Injectable (ADMELOG) 2 Unit(s) SubCutaneous three times a day before meals  iron sucrose IVPB 100 milliGRAM(s) IV Intermittent every 24 hours  labetalol 50 milliGRAM(s) Oral two times a day  levothyroxine 50 MICROGram(s) Oral daily  pantoprazole    Tablet 40 milliGRAM(s) Oral before breakfast    MEDICATIONS  (PRN):  dextrose Oral Gel 15 Gram(s) Oral once PRN Blood Glucose LESS THAN 70 milliGRAM(s)/deciliter      CAPILLARY BLOOD GLUCOSE      POCT Blood Glucose.: 147 mg/dL (01 Oct 2022 07:33)  POCT Blood Glucose.: 265 mg/dL (30 Sep 2022 21:34)  POCT Blood Glucose.: 206 mg/dL (30 Sep 2022 19:32)  POCT Blood Glucose.: 152 mg/dL (30 Sep 2022 16:43)  POCT Blood Glucose.: 309 mg/dL (30 Sep 2022 13:26)  POCT Blood Glucose.: 277 mg/dL (30 Sep 2022 11:52)  POCT Blood Glucose.: 132 mg/dL (30 Sep 2022 07:54)    I&O's Summary      PHYSICAL EXAM:  Vital Signs Last 24 Hrs  T(C): 36.5 (01 Oct 2022 05:45), Max: 36.7 (30 Sep 2022 23:10)  T(F): 97.7 (01 Oct 2022 05:45), Max: 98 (30 Sep 2022 23:10)  HR: 59 (01 Oct 2022 05:45) (53 - 59)  BP: 145/70 (01 Oct 2022 05:45) (119/54 - 150/68)  BP(mean): --  RR: 18 (01 Oct 2022 05:45) (18 - 19)  SpO2: 100% (01 Oct 2022 05:45) (98% - 100%)    Parameters below as of 01 Oct 2022 05:45  Patient On (Oxygen Delivery Method): room air        CONSTITUTIONAL: NAD, well-developed  RESPIRATORY: Normal respiratory effort; lungs are clear to auscultation bilaterally  CARDIOVASCULAR: Regular rate and rhythm, normal S1 and S2, no murmur/rub/gallop; 1+lower extremity edema; Peripheral pulses are 2+ bilaterally  ABDOMEN: Nontender to palpation, normoactive bowel sounds, no rebound/guarding; No hepatosplenomegaly  MUSCLOSKELETAL: BL LE swelling 1+ edema  PSYCH: A+O to person, place, and time; affect appropriate    LABS:                        8.0    7.52  )-----------( 174      ( 30 Sep 2022 06:20 )             25.3     09-30    136  |  104  |  60<H>  ----------------------------<  124<H>  5.0   |  21<L>  |  1.54<H>    Ca    8.8      30 Sep 2022 06:20  Phos  4.2     09-30  Mg     1.90     09-30    TPro  6.5  /  Alb  3.8  /  TBili  0.5  /  DBili  x   /  AST  44<H>  /  ALT  155<H>  /  AlkPhos  377<H>  09-30

## 2022-10-01 NOTE — PROGRESS NOTE ADULT - PROBLEM SELECTOR PLAN 4
Per pt takes 20 of levemir depending on FSGs at home. Missed night of admission  - Increasing FSGs over past 24 hours   - 14u lantus, 2 units pre-meal ordered   - c/w low dose sliding scale  - ctm blood sugars for adjustment Per pt takes 20 of levemir depending on FSGs at home. Missed night of admission  - Increasing FSGs over past 24 hours   - 14u lantus  - increased premeal from2 units to 4 units  - c/w low dose sliding scale  - ctm blood sugars for adjustment

## 2022-10-02 LAB
ALBUMIN SERPL ELPH-MCNC: 4 G/DL — SIGNIFICANT CHANGE UP (ref 3.3–5)
ALP SERPL-CCNC: 369 U/L — HIGH (ref 40–120)
ALT FLD-CCNC: 111 U/L — HIGH (ref 4–33)
ANION GAP SERPL CALC-SCNC: 12 MMOL/L — SIGNIFICANT CHANGE UP (ref 7–14)
AST SERPL-CCNC: 39 U/L — HIGH (ref 4–32)
BILIRUB SERPL-MCNC: 0.3 MG/DL — SIGNIFICANT CHANGE UP (ref 0.2–1.2)
BUN SERPL-MCNC: 71 MG/DL — HIGH (ref 7–23)
CALCIUM SERPL-MCNC: 8.9 MG/DL — SIGNIFICANT CHANGE UP (ref 8.4–10.5)
CHLORIDE SERPL-SCNC: 100 MMOL/L — SIGNIFICANT CHANGE UP (ref 98–107)
CK SERPL-CCNC: 74 U/L — SIGNIFICANT CHANGE UP (ref 25–170)
CO2 SERPL-SCNC: 22 MMOL/L — SIGNIFICANT CHANGE UP (ref 22–31)
CREAT SERPL-MCNC: 1.75 MG/DL — HIGH (ref 0.5–1.3)
EGFR: 34 ML/MIN/1.73M2 — LOW
GLUCOSE BLDC GLUCOMTR-MCNC: 176 MG/DL — HIGH (ref 70–99)
GLUCOSE BLDC GLUCOMTR-MCNC: 231 MG/DL — HIGH (ref 70–99)
GLUCOSE BLDC GLUCOMTR-MCNC: 249 MG/DL — HIGH (ref 70–99)
GLUCOSE BLDC GLUCOMTR-MCNC: 266 MG/DL — HIGH (ref 70–99)
GLUCOSE SERPL-MCNC: 150 MG/DL — HIGH (ref 70–99)
HCT VFR BLD CALC: 25 % — LOW (ref 34.5–45)
HGB BLD-MCNC: 8 G/DL — LOW (ref 11.5–15.5)
MAGNESIUM SERPL-MCNC: 1.8 MG/DL — SIGNIFICANT CHANGE UP (ref 1.6–2.6)
MCHC RBC-ENTMCNC: 24.6 PG — LOW (ref 27–34)
MCHC RBC-ENTMCNC: 32 GM/DL — SIGNIFICANT CHANGE UP (ref 32–36)
MCV RBC AUTO: 76.9 FL — LOW (ref 80–100)
NRBC # BLD: 0 /100 WBCS — SIGNIFICANT CHANGE UP (ref 0–0)
NRBC # FLD: 0 K/UL — SIGNIFICANT CHANGE UP (ref 0–0)
NT-PROBNP SERPL-SCNC: 2164 PG/ML — HIGH
PHOSPHATE SERPL-MCNC: 4.4 MG/DL — SIGNIFICANT CHANGE UP (ref 2.5–4.5)
PLATELET # BLD AUTO: 168 K/UL — SIGNIFICANT CHANGE UP (ref 150–400)
POTASSIUM SERPL-MCNC: 4.8 MMOL/L — SIGNIFICANT CHANGE UP (ref 3.5–5.3)
POTASSIUM SERPL-SCNC: 4.8 MMOL/L — SIGNIFICANT CHANGE UP (ref 3.5–5.3)
PROT SERPL-MCNC: 6.6 G/DL — SIGNIFICANT CHANGE UP (ref 6–8.3)
RBC # BLD: 3.25 M/UL — LOW (ref 3.8–5.2)
RBC # FLD: 16.9 % — HIGH (ref 10.3–14.5)
SODIUM SERPL-SCNC: 134 MMOL/L — LOW (ref 135–145)
WBC # BLD: 6.51 K/UL — SIGNIFICANT CHANGE UP (ref 3.8–10.5)
WBC # FLD AUTO: 6.51 K/UL — SIGNIFICANT CHANGE UP (ref 3.8–10.5)

## 2022-10-02 PROCEDURE — 99233 SBSQ HOSP IP/OBS HIGH 50: CPT | Mod: GC

## 2022-10-02 RX ORDER — INFLUENZA VIRUS VACCINE 15; 15; 15; 15 UG/.5ML; UG/.5ML; UG/.5ML; UG/.5ML
0.5 SUSPENSION INTRAMUSCULAR ONCE
Refills: 0 | Status: DISCONTINUED | OUTPATIENT
Start: 2022-10-02 | End: 2022-10-07

## 2022-10-02 RX ORDER — FUROSEMIDE 40 MG
40 TABLET ORAL EVERY 12 HOURS
Refills: 0 | Status: DISCONTINUED | OUTPATIENT
Start: 2022-10-02 | End: 2022-10-03

## 2022-10-02 RX ORDER — LABETALOL HCL 100 MG
50 TABLET ORAL
Refills: 0 | Status: DISCONTINUED | OUTPATIENT
Start: 2022-10-02 | End: 2022-10-07

## 2022-10-02 RX ADMIN — Medication 40 MILLIGRAM(S): at 18:46

## 2022-10-02 RX ADMIN — PANTOPRAZOLE SODIUM 40 MILLIGRAM(S): 20 TABLET, DELAYED RELEASE ORAL at 06:23

## 2022-10-02 RX ADMIN — Medication 1: at 21:34

## 2022-10-02 RX ADMIN — Medication 50 MICROGRAM(S): at 06:23

## 2022-10-02 RX ADMIN — Medication 4 UNIT(S): at 08:53

## 2022-10-02 RX ADMIN — Medication 50 MILLIGRAM(S): at 05:27

## 2022-10-02 RX ADMIN — Medication 4 UNIT(S): at 12:31

## 2022-10-02 RX ADMIN — Medication 50 MILLIGRAM(S): at 18:43

## 2022-10-02 RX ADMIN — Medication 2: at 12:30

## 2022-10-02 RX ADMIN — INSULIN GLARGINE 14 UNIT(S): 100 INJECTION, SOLUTION SUBCUTANEOUS at 09:09

## 2022-10-02 RX ADMIN — HEPARIN SODIUM 5000 UNIT(S): 5000 INJECTION INTRAVENOUS; SUBCUTANEOUS at 18:46

## 2022-10-02 RX ADMIN — Medication 2: at 17:50

## 2022-10-02 RX ADMIN — Medication 1: at 08:52

## 2022-10-02 RX ADMIN — Medication 0.1 MILLIGRAM(S): at 14:32

## 2022-10-02 RX ADMIN — Medication 100 MILLIGRAM(S): at 14:33

## 2022-10-02 RX ADMIN — IRON SUCROSE 210 MILLIGRAM(S): 20 INJECTION, SOLUTION INTRAVENOUS at 21:33

## 2022-10-02 RX ADMIN — Medication 100 MILLIGRAM(S): at 05:27

## 2022-10-02 RX ADMIN — Medication 4 UNIT(S): at 17:50

## 2022-10-02 RX ADMIN — HEPARIN SODIUM 5000 UNIT(S): 5000 INJECTION INTRAVENOUS; SUBCUTANEOUS at 05:27

## 2022-10-02 RX ADMIN — Medication 100 MILLIGRAM(S): at 21:33

## 2022-10-02 RX ADMIN — Medication 0.1 MILLIGRAM(S): at 21:33

## 2022-10-02 RX ADMIN — ATORVASTATIN CALCIUM 40 MILLIGRAM(S): 80 TABLET, FILM COATED ORAL at 21:33

## 2022-10-02 RX ADMIN — AMLODIPINE BESYLATE 10 MILLIGRAM(S): 2.5 TABLET ORAL at 06:24

## 2022-10-02 NOTE — CONSULT NOTE ADULT - ATTENDING COMMENTS
55F with HTN, HLD, DM2, anemia presents to ED with respiratory distress, recent hospitalization for hypertensive emergency with pulm edema at [facility]  Echo with normal LV function and no significant valvular disease. PASP consistent with severe pulmonary hypertension.   SBP 130s, improved with resumption of home medications.   Consider addition of aldactone and wean off clonidine.
Pt. with ISAMAR on likely CKD. Pt. seen and examined in [facility] today (10/3/22). Pt. feels better but gives history of B/L LE edema. No fever, CP or SOB during rounds today. Pt. says she was recently advised to see a nephrologist as outpatient. Pt. clinically stable. Pt. with significant B/L LE edema, currently on IV Lasix 40 mg BID. Scr increased to 1.94 today (10/3/22). Assessment and plan for ISAMAR as outlined above. Monitor labs and urine output. Avoid any potential nephrotoxins. Dose medications as per eGFR.

## 2022-10-02 NOTE — PROGRESS NOTE ADULT - PROBLEM SELECTOR PLAN 4
Per pt takes 20 of levemir depending on FSGs at home. Missed night of admission  - Increasing FSGs over past 24 hours   - 14u lantus  - increased premeal from2 units to 4 units  - c/w low dose sliding scale  - ctm blood sugars for adjustment

## 2022-10-02 NOTE — CONSULT NOTE ADULT - SUBJECTIVE AND OBJECTIVE BOX
[facility] DIVISION OF KIDNEY DISEASES AND HYPERTENSION -- [phone]  -- INITIAL CONSULT NOTE  --------------------------------------------------------------------------------  HPI: 56yo F with PMH of HTN, HLD, DM2, anemia presents to ED for progressive and severe resp distress, hypoxemic respiratory failure due to acute on chronic heart failure exacerbation. Received IV diuresis by cardiology since admission & currently off it. Nephrology called for worsening SCr. SCr on admission was 1.4 on 9/27, which peaked to 1.70 on 9/29 & has stayed between 1.4-1.7 since then. Latest SCr is 1.75. No prior labs on [facility]. Last lasix IV dose was on 9/30. Pro-BNP trended down from 6000 to 2000.  cc in 24 hrs.         Patient seen & examined. Denies chest pain, fever, chills, increased frequency, dysuria, hematuria, pus in urine, frothy urine, SOB, leg edema, loss of appetite, pruritis, N/V.      PAST HISTORY  --------------------------------------------------------------------------------  PAST MEDICAL & SURGICAL HISTORY:  HTN (hypertension)      HLD (hyperlipidemia)      DM (diabetes mellitus)      No significant past surgical history        FAMILY HISTORY:  No pertinent family history in first degree relatives      PAST SOCIAL HISTORY:    ALLERGIES & MEDICATIONS  --------------------------------------------------------------------------------  Allergies    No Known Allergies    Intolerances      Standing Inpatient Medications  amLODIPine   Tablet 10 milliGRAM(s) Oral daily  atorvastatin 40 milliGRAM(s) Oral at bedtime  cloNIDine 0.1 milliGRAM(s) Oral three times a day  dextrose 5%. 1000 milliLiter(s) IV Continuous <Continuous>  dextrose 5%. 1000 milliLiter(s) IV Continuous <Continuous>  dextrose 50% Injectable 12.5 Gram(s) IV Push once  dextrose 50% Injectable 25 Gram(s) IV Push once  dextrose 50% Injectable 25 Gram(s) IV Push once  glucagon  Injectable 1 milliGRAM(s) IntraMuscular once  heparin   Injectable 5000 Unit(s) SubCutaneous every 12 hours  hydrALAZINE 100 milliGRAM(s) Oral three times a day  insulin glargine Injectable (LANTUS) 14 Unit(s) SubCutaneous every morning  insulin lispro (ADMELOG) corrective regimen sliding scale   SubCutaneous three times a day before meals  insulin lispro (ADMELOG) corrective regimen sliding scale   SubCutaneous at bedtime  insulin lispro Injectable (ADMELOG) 4 Unit(s) SubCutaneous three times a day before meals  iron sucrose IVPB 100 milliGRAM(s) IV Intermittent every 24 hours  labetalol 50 milliGRAM(s) Oral two times a day  levothyroxine 50 MICROGram(s) Oral daily  pantoprazole    Tablet 40 milliGRAM(s) Oral before breakfast    PRN Inpatient Medications  dextrose Oral Gel 15 Gram(s) Oral once PRN      REVIEW OF SYSTEMS  --------------------------------------------------------------------------------  Gen: No chills  Respiratory: No dyspnea, cough  CV: No chest pain  GI: No abdominal pain, diarrhea,  nausea, vomiting  : No increased frequency, dysuria, hematuria  MSK:  no edema  Neuro: No dizziness/lightheadedness    All other systems were reviewed and are negative, except as noted.    VITALS/PHYSICAL EXAM  --------------------------------------------------------------------------------  T(C): 36.9 (10-02-22 @ 05:26), Max: 36.9 (10-01-22 @ 21:02)  HR: 55 (10-02-22 @ 12:00) (55 - 80)  BP: 133/62 (10-02-22 @ 12:00) (124/78 - 138/74)  RR: 19 (10-02-22 @ 12:00) (17 - 19)  SpO2: 99% (10-02-22 @ 12:00) (99% - 100%)  Wt(kg): --        10-01-22 @ 07:01  -  10-02-22 @ 07:00  --------------------------------------------------------  IN: 0 mL / OUT: 800 mL / NET: -800 mL      Physical Exam:  	Gen: elderly, NAD  	HEENT: Anicteric, MMM, no JVD  	Pulm: CTA B/L  	CV: S1S2, no rub  	Abd: Soft, +BS, NT, ND           No presacral edema  	Ext: No LE edema B/L, no asterixis  	Neuro: Awake, alert  	Skin: Warm and dry  	Vascular access:    LABS/STUDIES  --------------------------------------------------------------------------------              8.0    6.51  >-----------<  168      [10-02-22 @ 06:22]              25.0     134  |  100  |  71  ----------------------------<  150      [10-02-22 @ 06:22]  4.8   |  22  |  1.75        Ca     8.9     [10-02-22 @ 06:22]      Mg     1.80     [10-02-22 @ 06:22]      Phos  4.4     [10-02-22 @ 06:22]    TPro  6.6  /  Alb  4.0  /  TBili  0.3  /  DBili  x   /  AST  39  /  ALT  111  /  AlkPhos  369  [10-02-22 @ 06:22]          Creatinine Trend:  SCr 1.75 [10-02 @ 06:22]  SCr 1.65 [10-01 @ 06:35]  SCr 1.54 [09-30 @ 06:20]  SCr 1.70 [09-29 @ 06:00]  SCr 1.37 [09-28 @ 07:47]    Urinalysis - [09-28-22 @ 07:43]      Color Light Yellow / Appearance Clear / SG 1.014 / pH 6.0      Gluc >= 1000 mg/dL / Ketone Negative  / Bili Negative / Urobili <2 mg/dL       Blood Trace / Protein 300 mg/dL / Leuk Est Negative / Nitrite Negative      RBC 4 / WBC 1 / Hyaline 1 / Gran  / Sq Epi  / Non Sq Epi 1 / Bacteria Negative    Urine Creatinine 44      [09-28-22 @ 07:43]  Urine Sodium 39      [09-28-22 @ 07:43]    Iron 43, TIBC 266, %sat 16      [09-29-22 @ 06:00]  Ferritin 821      [09-29-22 @ 06:00]  TSH 8.63      [09-28-22 @ 07:47]       [facility] DIVISION OF KIDNEY DISEASES AND HYPERTENSION -- [phone]  -- INITIAL CONSULT NOTE  --------------------------------------------------------------------------------  HPI: 56yo F with PMH of HTN, HLD, DM2, anemia presents to ED for progressive and severe resp distress, hypoxemic respiratory failure due to acute on chronic heart failure exacerbation. Received IV diuresis by cardiology since admission & currently off it. Nephrology called for worsening SCr. SCr on admission was 1.4 on 9/27, which peaked to 1.70 on 9/29 & has stayed between 1.4-1.7 since then. Latest SCr is 1.75. No prior labs on [facility]. Last lasix IV dose was on 9/30. Pro-BNP trended down from 6000 to 2000. Pt denies any prior history of kidney disease. No NSAIDs/ herbal meds used.  cc in 24 hrs.     Patient seen & examined. C/o SOB, PND, orthopnea & leg edema. Denies chest pain, fever, chills, dysuria, hematuria, pus in urine, frothy urine, N/V/D      PAST HISTORY  --------------------------------------------------------------------------------  PAST MEDICAL & SURGICAL HISTORY:  HTN (hypertension)      HLD (hyperlipidemia)      DM (diabetes mellitus)      No significant past surgical history        FAMILY HISTORY:  No pertinent family history in first degree relatives      PAST SOCIAL HISTORY:    ALLERGIES & MEDICATIONS  --------------------------------------------------------------------------------  Allergies    No Known Allergies    Intolerances      Standing Inpatient Medications  amLODIPine   Tablet 10 milliGRAM(s) Oral daily  atorvastatin 40 milliGRAM(s) Oral at bedtime  cloNIDine 0.1 milliGRAM(s) Oral three times a day  dextrose 5%. 1000 milliLiter(s) IV Continuous <Continuous>  dextrose 5%. 1000 milliLiter(s) IV Continuous <Continuous>  dextrose 50% Injectable 12.5 Gram(s) IV Push once  dextrose 50% Injectable 25 Gram(s) IV Push once  dextrose 50% Injectable 25 Gram(s) IV Push once  glucagon  Injectable 1 milliGRAM(s) IntraMuscular once  heparin   Injectable 5000 Unit(s) SubCutaneous every 12 hours  hydrALAZINE 100 milliGRAM(s) Oral three times a day  insulin glargine Injectable (LANTUS) 14 Unit(s) SubCutaneous every morning  insulin lispro (ADMELOG) corrective regimen sliding scale   SubCutaneous three times a day before meals  insulin lispro (ADMELOG) corrective regimen sliding scale   SubCutaneous at bedtime  insulin lispro Injectable (ADMELOG) 4 Unit(s) SubCutaneous three times a day before meals  iron sucrose IVPB 100 milliGRAM(s) IV Intermittent every 24 hours  labetalol 50 milliGRAM(s) Oral two times a day  levothyroxine 50 MICROGram(s) Oral daily  pantoprazole    Tablet 40 milliGRAM(s) Oral before breakfast    PRN Inpatient Medications  dextrose Oral Gel 15 Gram(s) Oral once PRN      REVIEW OF SYSTEMS  --------------------------------------------------------------------------------  Gen: No chills  Respiratory: + dyspnea, cough  CV: No chest pain  GI: No abdominal pain, diarrhea,  nausea, vomiting  : No increased frequency, dysuria, hematuria  MSK:  + edema  Neuro: No dizziness/lightheadedness    All other systems were reviewed and are negative, except as noted.    VITALS/PHYSICAL EXAM  --------------------------------------------------------------------------------  T(C): 36.9 (10-02-22 @ 05:26), Max: 36.9 (10-01-22 @ 21:02)  HR: 55 (10-02-22 @ 12:00) (55 - 80)  BP: 133/62 (10-02-22 @ 12:00) (124/78 - 138/74)  RR: 19 (10-02-22 @ 12:00) (17 - 19)  SpO2: 99% (10-02-22 @ 12:00) (99% - 100%)  Wt(kg): --        10-01-22 @ 07:01  -  10-02-22 @ 07:00  --------------------------------------------------------  IN: 0 mL / OUT: 800 mL / NET: -800 mL      Physical Exam:  	Gen: NAD  	HEENT: Anicteric, MMM, + JVD  	Pulm: bibasilar crackles  	CV: S1S2, no rub  	Abd: Soft, +BS, NT, + distended              + presacral edema  	Ext: ++ tense LE edema B/L  	Neuro: Awake, alert  	Skin: Warm and dry  	Vascular access:    LABS/STUDIES  --------------------------------------------------------------------------------              8.0    6.51  >-----------<  168      [10-02-22 @ 06:22]              25.0     134  |  100  |  71  ----------------------------<  150      [10-02-22 @ 06:22]  4.8   |  22  |  1.75        Ca     8.9     [10-02-22 @ 06:22]      Mg     1.80     [10-02-22 @ 06:22]      Phos  4.4     [10-02-22 @ 06:22]    TPro  6.6  /  Alb  4.0  /  TBili  0.3  /  DBili  x   /  AST  39  /  ALT  111  /  AlkPhos  369  [10-02-22 @ 06:22]          Creatinine Trend:  SCr 1.75 [10-02 @ 06:22]  SCr 1.65 [10-01 @ 06:35]  SCr 1.54 [09-30 @ 06:20]  SCr 1.70 [09-29 @ 06:00]  SCr 1.37 [09-28 @ 07:47]    Urinalysis - [09-28-22 @ 07:43]      Color Light Yellow / Appearance Clear / SG 1.014 / pH 6.0      Gluc >= 1000 mg/dL / Ketone Negative  / Bili Negative / Urobili <2 mg/dL       Blood Trace / Protein 300 mg/dL / Leuk Est Negative / Nitrite Negative      RBC 4 / WBC 1 / Hyaline 1 / Gran  / Sq Epi  / Non Sq Epi 1 / Bacteria Negative    Urine Creatinine 44      [09-28-22 @ 07:43]  Urine Sodium 39      [09-28-22 @ 07:43]    Iron 43, TIBC 266, %sat 16      [09-29-22 @ 06:00]  Ferritin 821      [09-29-22 @ 06:00]  TSH 8.63      [09-28-22 @ 07:47]       [facility] DIVISION OF KIDNEY DISEASES AND HYPERTENSION -- [phone]  -- INITIAL CONSULT NOTE  --------------------------------------------------------------------------------  HPI: 57-year-old female with PMH of HTN, HLD, DM2, anemia presents to ED for progressive and severe resp distress, hypoxemic respiratory failure due to acute on chronic heart failure exacerbation. Received IV diuresis as per by cardiology team since admission & currently off it. Nephrology called for worsening Scr. Scr on admission was 1.4 on 9/27, which peaked to 1.70 on 9/29 & has stayed between 1.4-1.7 since then. Latest Scr is 1.75. No prior labs on [facility]. Last Lasix IV dose was on 9/30. Pro-BNP trended down from 6000 to 2000. Pt. denies any prior history of kidney disease. No NSAIDs/ herbal meds used.  cc in 24 hrs.     Patient seen & examined. Pt. c/o SOB, PND, orthopnea & leg edema. Denies chest pain, fever, chills, dysuria, hematuria, pus in urine, frothy urine, N/V/D    PAST HISTORY  --------------------------------------------------------------------------------  PAST MEDICAL & SURGICAL HISTORY:  HTN (hypertension)  HLD (hyperlipidemia)  DM (diabetes mellitus)  No significant past surgical history    FAMILY HISTORY:  No pertinent family history in first degree relatives    PAST SOCIAL HISTORY:    ALLERGIES & MEDICATIONS  --------------------------------------------------------------------------------  Allergies    No Known Allergies    Intolerances    Standing Inpatient Medications  amLODIPine   Tablet 10 milliGRAM(s) Oral daily  atorvastatin 40 milliGRAM(s) Oral at bedtime  cloNIDine 0.1 milliGRAM(s) Oral three times a day  dextrose 5%. 1000 milliLiter(s) IV Continuous <Continuous>  dextrose 5%. 1000 milliLiter(s) IV Continuous <Continuous>  dextrose 50% Injectable 12.5 Gram(s) IV Push once  dextrose 50% Injectable 25 Gram(s) IV Push once  dextrose 50% Injectable 25 Gram(s) IV Push once  glucagon  Injectable 1 milliGRAM(s) IntraMuscular once  heparin   Injectable 5000 Unit(s) SubCutaneous every 12 hours  hydrALAZINE 100 milliGRAM(s) Oral three times a day  insulin glargine Injectable (LANTUS) 14 Unit(s) SubCutaneous every morning  insulin lispro (ADMELOG) corrective regimen sliding scale   SubCutaneous three times a day before meals  insulin lispro (ADMELOG) corrective regimen sliding scale   SubCutaneous at bedtime  insulin lispro Injectable (ADMELOG) 4 Unit(s) SubCutaneous three times a day before meals  iron sucrose IVPB 100 milliGRAM(s) IV Intermittent every 24 hours  labetalol 50 milliGRAM(s) Oral two times a day  levothyroxine 50 MICROGram(s) Oral daily  pantoprazole    Tablet 40 milliGRAM(s) Oral before breakfast    PRN Inpatient Medications  dextrose Oral Gel 15 Gram(s) Oral once PRN    REVIEW OF SYSTEMS  --------------------------------------------------------------------------------  Gen: No chills  Respiratory: + dyspnea, cough  CV: No chest pain  GI: No abdominal pain, diarrhea,  nausea, vomiting  : No increased frequency, dysuria, hematuria  MSK:  + edema  Neuro: No dizziness/lightheadedness    All other systems were reviewed and are negative, except as noted.    VITALS/PHYSICAL EXAM  --------------------------------------------------------------------------------  T(C): 36.9 (10-02-22 @ 05:26), Max: 36.9 (10-01-22 @ 21:02)  HR: 55 (10-02-22 @ 12:00) (55 - 80)  BP: 133/62 (10-02-22 @ 12:00) (124/78 - 138/74)  RR: 19 (10-02-22 @ 12:00) (17 - 19)  SpO2: 99% (10-02-22 @ 12:00) (99% - 100%)  Wt(kg): --    10-01-22 @ 07:01  -  10-02-22 @ 07:00  --------------------------------------------------------  IN: 0 mL / OUT: 800 mL / NET: -800 mL    Physical Exam:  	Gen: NAD  	HEENT: Anicteric, MMM, + JVD  	Pulm: bibasilar crackles+  	CV: S1S2, no rub  	Abd: Soft, +BS, NT, + distended              Back: + presacral edema  	Ext: ++ tense LE edema B/L  	Neuro: Awake, alert  	Skin: Warm and dry  	Vascular access: peripheral IV line    LABS/STUDIES  --------------------------------------------------------------------------------              8.0    6.51  >-----------<  168      [10-02-22 @ 06:22]              25.0     134  |  100  |  71  ----------------------------<  150      [10-02-22 @ 06:22]  4.8   |  22  |  1.75        Ca     8.9     [10-02-22 @ 06:22]      Mg     1.80     [10-02-22 @ 06:22]      Phos  4.4     [10-02-22 @ 06:22]    TPro  6.6  /  Alb  4.0  /  TBili  0.3  /  DBili  x   /  AST  39  /  ALT  111  /  AlkPhos  369  [10-02-22 @ 06:22]    10-03    129<L>  |  97<L>  |  78<H>  ----------------------------<  270<H>  4.7   |  19<L>  |  1.94<H>    Ca    8.9      03 Oct 2022 07:05  Phos  4.2     10-03  Mg     1.80     10-03    TPro  6.4  /  Alb  3.6  /  TBili  0.3  /  DBili  x   /  AST  23  /  ALT  85<H>  /  AlkPhos  318<H>  10-03    Creatinine Trend:  SCr 1.75 [10-02 @ 06:22]  SCr 1.65 [10-01 @ 06:35]  SCr 1.54 [09-30 @ 06:20]  SCr 1.70 [09-29 @ 06:00]  SCr 1.37 [09-28 @ 07:47]    Urinalysis - [09-28-22 @ 07:43]      Color Light Yellow / Appearance Clear / SG 1.014 / pH 6.0      Gluc >= 1000 mg/dL / Ketone Negative  / Bili Negative / Urobili <2 mg/dL       Blood Trace / Protein 300 mg/dL / Leuk Est Negative / Nitrite Negative      RBC 4 / WBC 1 / Hyaline 1 / Gran  / Sq Epi  / Non Sq Epi 1 / Bacteria Negative    Urine Creatinine 44      [09-28-22 @ 07:43]  Urine Sodium 39      [09-28-22 @ 07:43]

## 2022-10-02 NOTE — PROGRESS NOTE ADULT - PROBLEM SELECTOR PLAN 1
Patient with resistant secondary HTN of unclear etiology. Found to have severe pulmonary hypertension on Echo. D/dx for secondary HTN includes renal artery stenosis vs primary pulmonary HTN vs OLIVE, less likely pheochromocytoma given clinical symptoms.    - goal 120s-150s systolic    - c/w home meds w/ hold parameters: amlodipine 10mg qd, labetalol 50mg BID, clonidine 0.1mg BID, hydral 100mg TID  - plan to wean off clonidine as per Cardio recs--continue clonidine 0.1 TID  - [facility] Duplex Abdomen/Pelvis w/ no significant renal stenosis  - TSH elevated to 8.63, Free T4 WNL  - f/u renin/reji (recent renin/reji at [facility] WNL)  - f/w urine/plasma metanephrines

## 2022-10-02 NOTE — PROGRESS NOTE ADULT - PROBLEM SELECTOR PLAN 3
Worsening Scr to 1.7. likely sequelae 2/2 to hypertensive emergency. Cannot r/o underlying CKD  - unknown baseline, elevated at last hospitalization  - per [facility] records Creatinine 1.83 at discharge 9/16/22  - VA Duplex Abdomen/Pelvis w/o concern for renal artery stenosis  - Urine Na and Urine Cr WNL  - U/A unremarkable   - CTM Bun/Cr.

## 2022-10-02 NOTE — PROGRESS NOTE ADULT - ATTENDING COMMENTS
57 y.o. F w/ a hx of HTN, DM2, hypothyroidism hospitalized for ADHF 2/2 HTN emergency.     Patient feels well, wants to go home. PE 2+ LE edema, stable. Labs w/ Cr 1.7. BNP 2K    # HTN emergency c/b ADHF: BP improved. Lower clonidine to .1mg TID, cont home meds. Follow up renin/reji, plasma metanephrines. Will continue diuresis.   # ISAMAR: Baseline 1.3 per OSH records, not improving despite diuresis. Nephro c/s   # DM2: Cont Lantus 14 units + Lispro 4 w/ meals+ SSI  # Transaminitis: Suspect hepatic congestion, improving with diuresis, continue.   # Hypothyroidism: TSH elevated 8.63- pt reports taking it in AM and takes a PPI soon after. Discussed importance of taking Synthroid on it's own on an empty stomach.   # Microcytic anemia: Cont iron repletion. Follow up OP GI for colonoscopy.

## 2022-10-02 NOTE — CONSULT NOTE ADULT - PROBLEM SELECTOR RECOMMENDATION 9
Pt with ISAMAR (non oliguric) vs ISAMAR on CKD vs CKD in the setting of acute on chronic CHF & severe PHTN      SCr on admission was 1.4 on 9/27, which peaked to 1.70 on 9/29 & has stayed between 1.4-1.7 since then. Latest SCr is 1.75. No prior labs on [facility]. Last lasix IV dose was on 9/30. Pro-BNP trended down from 6000 to 2000.     Ua with 300 mg/dl protein, trace blood, glu. Send urine electrolytes including urine urea, spot urine TP/CR. Check CPK. Check bladder scan & Renal US. Recommend Singh catheter placement if retaining. Diuretics. Monitor labs and urine output. Avoid NSAIDs, ACEI/ARBS, RCA and nephrotoxins. Dose medications as per eGFR.        If you have any questions, please feel free to contact me  Marlyn Burton  Nephrology Fellow  Pager NS: [phone]/ [facility]: [phone]    (After 5 pm or on weekends please page the on-call fellow, can check [email or web address] for schedule. Login is natanael torres, schedule under [facility] medicine, psych, derm) Pt with ISAMAR (non oliguric) vs ISAMAR on CKD in the setting of acute on chronic CHF & severe PHTN causing renal venous congestion      SCr on admission was 1.4 on 9/27, which peaked to 1.70 on 9/29 & has stayed between 1.4-1.7 since then. Latest SCr is 1.75. No prior labs on [facility]. Last lasix IV dose was on 9/30. Pro-BNP trended down from 6000 to 2000.     UA with 300 mg/dl protein, trace blood, glu. Send urine electrolytes including urine urea, spot urine TP/CR. Check CPK. Check bladder scan & Renal US. Recommend Singh catheter placement if retaining. Pt appears volume overloaded with bibasilar crackles, +JVD & tense LE edema. Would start lasix 40 mg IV bid.  Monitor labs and urine output. Daily weight. Avoid NSAIDs, ACEI/ARBS, RCA and nephrotoxins. Dose medications as per eGFR.    Disccussed with primary team    If you have any questions, please feel free to contact alonso Bruton  Nephrology Fellow  Pager NS: [phone]/ [facility]: [phone]    (After 5 pm or on weekends please page the on-call fellow, can check [email or web address] for schedule. Login is natanael torres, schedule under [facility] medicine, psych, derm) Pt with ISAMAR (non oliguric) vs ISAMAR on CKD in the setting of acute on chronic CHF & severe PHTN causing renal venous congestion      SCr on admission was 1.4 on 9/27, which peaked to 1.70 on 9/29 & has stayed between 1.4-1.7 since then. Latest SCr is 1.75. No prior labs on [facility]. Last lasix IV dose was on 9/30. Pro-BNP trended down from 6000 to 2000.     UA with 300 mg/dl protein, trace blood, glu. Send urine electrolytes including urine urea, spot urine TP/CR. Check CPK. Check bladder scan & Renal US. Recommend Singh catheter placement if retaining. Pt appears volume overloaded with bibasilar crackles, +JVD & tense LE edema. Would start lasix 40 mg IV bid.  Monitor labs and urine output. Daily weight. Avoid NSAIDs, ACEI/ARBS, RCA and nephrotoxins. Dose medications as per eGFR.    Disccussed with primary team & Dr. Miller    If you have any questions, please feel free to contact me  Marlyn Burton  Nephrology Fellow  Pager NS: [phone]/ [facility]: [phone]    (After 5 pm or on weekends please page the on-call fellow, can check [email or web address] for schedule. Login is natanael torres, schedule under [facility] medicine, psych, derm) Pt. with non-oliguric ISAMAR on likely CKD in the setting of acute on chronic CHF & severe PHTN causing renal venous congestion. Scr on admission was 1.4 on 9/27, which peaked to 1.70 on 9/29 & has stayed between 1.4-1.7 since then. Latest Scr is 1.75. No prior labs on [facility]. Last Lasix IV dose was on 9/30. Pro-BNP trended down from 6000 to 2000. UA with 300 mg/dl protein, trace blood, glucose. Send urine electrolytes including urine urea, spot urine TP/CR. Check CPK. Check bladder scan & Renal US. Recommend Singh catheter placement if retaining. Pt appears volume overloaded with bibasilar crackles, +JVD & tense LE edema. Would start lasix 40 mg IV bid.  Monitor labs and urine output. Daily weight. Avoid NSAIDs, ACEI/ARBS, RCA and nephrotoxins. Dose medications as per eGFR.    Disccussed with primary team & Dr. Miller.    If you have any questions, please feel free to contact me  Marlyn Burton  Nephrology Fellow  Pager NS: [phone]/ [facility]: [phone]    (After 5 pm or on weekends please page the on-call fellow, can check [email or web address] for schedule. Login is natanael torres, schedule under [facility] medicine, psych, derm)

## 2022-10-02 NOTE — PROGRESS NOTE ADULT - ASSESSMENT
58yo F with PMH of HTN, HLD, DM2, anemia presenting w resistant hypertension resulting in SOB c/f flash pulm edema. Now BP well controlled. Patient is still volume overloaded, requiring diuresis. Patient also anemic w/ transfusion 9/29. Increased lasxi to 80 mg IV daily on 10/1 and also increased lispro from 2u to 4u premeal.

## 2022-10-02 NOTE — PROGRESS NOTE ADULT - PROBLEM SELECTOR PLAN 2
Acute decompensated heart failure.   On admission, probnp. Echo: EF: 64%, elevated right sided pressures consistent with severe pulmonary hypertension, but no LV dysfunction   - s/p diuresis w/ 40mg IV lasix 9/30 --> increased to 80 mg IV lasix on 10/1 daily  - Cards: repeat Echo when euvolemic to reassess pulmonary hypertension as patient is fluid overloaded   - Strict I's and O's  - Goal of Mg >2, K >4.

## 2022-10-02 NOTE — PROGRESS NOTE ADULT - SUBJECTIVE AND OBJECTIVE BOX
Walter Kincaid, PGY2    DATE OF SERVICE: 10-02-22 @ 07:36    Patient is a 57y old  Female who presents with a chief complaint of shortness of breath (01 Oct 2022 07:52)      SUBJECTIVE / OVERNIGHT EVENTS: No acute events overnight. Patient seen and examined at bedside this AM.     MEDICATIONS  (STANDING):  amLODIPine   Tablet 10 milliGRAM(s) Oral daily  atorvastatin 40 milliGRAM(s) Oral at bedtime  cloNIDine 0.1 milliGRAM(s) Oral three times a day  dextrose 5%. 1000 milliLiter(s) (100 mL/Hr) IV Continuous <Continuous>  dextrose 5%. 1000 milliLiter(s) (50 mL/Hr) IV Continuous <Continuous>  dextrose 50% Injectable 25 Gram(s) IV Push once  dextrose 50% Injectable 12.5 Gram(s) IV Push once  dextrose 50% Injectable 25 Gram(s) IV Push once  furosemide   Injectable 80 milliGRAM(s) IV Push daily  glucagon  Injectable 1 milliGRAM(s) IntraMuscular once  heparin   Injectable 5000 Unit(s) SubCutaneous every 12 hours  hydrALAZINE 100 milliGRAM(s) Oral three times a day  insulin glargine Injectable (LANTUS) 14 Unit(s) SubCutaneous every morning  insulin lispro (ADMELOG) corrective regimen sliding scale   SubCutaneous three times a day before meals  insulin lispro (ADMELOG) corrective regimen sliding scale   SubCutaneous at bedtime  insulin lispro Injectable (ADMELOG) 4 Unit(s) SubCutaneous three times a day before meals  iron sucrose IVPB 100 milliGRAM(s) IV Intermittent every 24 hours  labetalol 50 milliGRAM(s) Oral two times a day  levothyroxine 50 MICROGram(s) Oral daily  pantoprazole    Tablet 40 milliGRAM(s) Oral before breakfast    MEDICATIONS  (PRN):  dextrose Oral Gel 15 Gram(s) Oral once PRN Blood Glucose LESS THAN 70 milliGRAM(s)/deciliter      Vital Signs Last 24 Hrs  T(C): 36.9 (02 Oct 2022 05:26), Max: 36.9 (01 Oct 2022 21:02)  T(F): 98.4 (02 Oct 2022 05:26), Max: 98.4 (01 Oct 2022 21:02)  HR: 80 (02 Oct 2022 05:26) (55 - 80)  BP: 138/74 (02 Oct 2022 05:26) (122/75 - 138/74)  BP(mean): --  RR: 17 (02 Oct 2022 05:26) (17 - 18)  SpO2: 100% (02 Oct 2022 05:26) (100% - 100%)    Parameters below as of 02 Oct 2022 05:26  Patient On (Oxygen Delivery Method): room air      CAPILLARY BLOOD GLUCOSE      POCT Blood Glucose.: 181 mg/dL (01 Oct 2022 21:38)  POCT Blood Glucose.: 247 mg/dL (01 Oct 2022 16:57)  POCT Blood Glucose.: 175 mg/dL (01 Oct 2022 11:52)    I&O's Summary    01 Oct 2022 07:01  -  02 Oct 2022 07:00  --------------------------------------------------------  IN: 0 mL / OUT: 800 mL / NET: -800 mL        PHYSICAL EXAM:  CONSTITUTIONAL: NAD, well-developed  RESPIRATORY: Normal respiratory effort; lungs are clear to auscultation bilaterally  CARDIOVASCULAR: Regular rate and rhythm, normal S1 and S2, no murmur/rub/gallop; 1+lower extremity edema; Peripheral pulses are 2+ bilaterally  ABDOMEN: Nontender to palpation, normoactive bowel sounds, no rebound/guarding; No hepatosplenomegaly  MUSCLOSKELETAL: BL LE swelling 1+ edema  PSYCH: A+O to person, place, and time; affect appropriate    LABS:                        8.0    6.51  )-----------( 168      ( 02 Oct 2022 06:22 )             25.0     10-01    135  |  103  |  65<H>  ----------------------------<  147<H>  4.8   |  20<L>  |  1.65<H>    Ca    8.5      01 Oct 2022 06:35  Phos  4.2     10-01  Mg     1.80     10-01    TPro  6.2  /  Alb  3.7  /  TBili  0.3  /  DBili  x   /  AST  34<H>  /  ALT  118<H>  /  AlkPhos  367<H>  10-01              RADIOLOGY & ADDITIONAL TESTS:    Imaging Personally Reviewed:    Consultant(s) Notes Reviewed:      Care Discussed with Consultants/Other Providers:

## 2022-10-03 LAB
ALBUMIN SERPL ELPH-MCNC: 3.6 G/DL — SIGNIFICANT CHANGE UP (ref 3.3–5)
ALP SERPL-CCNC: 318 U/L — HIGH (ref 40–120)
ALT FLD-CCNC: 85 U/L — HIGH (ref 4–33)
ANION GAP SERPL CALC-SCNC: 13 MMOL/L — SIGNIFICANT CHANGE UP (ref 7–14)
AST SERPL-CCNC: 23 U/L — SIGNIFICANT CHANGE UP (ref 4–32)
BASOPHILS # BLD AUTO: 0.02 K/UL — SIGNIFICANT CHANGE UP (ref 0–0.2)
BASOPHILS NFR BLD AUTO: 0.2 % — SIGNIFICANT CHANGE UP (ref 0–2)
BILIRUB SERPL-MCNC: 0.3 MG/DL — SIGNIFICANT CHANGE UP (ref 0.2–1.2)
BLD GP AB SCN SERPL QL: NEGATIVE — SIGNIFICANT CHANGE UP
BUN SERPL-MCNC: 78 MG/DL — HIGH (ref 7–23)
CALCIUM SERPL-MCNC: 8.9 MG/DL — SIGNIFICANT CHANGE UP (ref 8.4–10.5)
CHLORIDE SERPL-SCNC: 97 MMOL/L — LOW (ref 98–107)
CHLORIDE UR-SCNC: 102 MMOL/L — SIGNIFICANT CHANGE UP
CO2 SERPL-SCNC: 19 MMOL/L — LOW (ref 22–31)
CREAT ?TM UR-MCNC: 22 MG/DL — SIGNIFICANT CHANGE UP
CREAT SERPL-MCNC: 1.94 MG/DL — HIGH (ref 0.5–1.3)
EGFR: 30 ML/MIN/1.73M2 — LOW
EOSINOPHIL # BLD AUTO: 0.48 K/UL — SIGNIFICANT CHANGE UP (ref 0–0.5)
EOSINOPHIL NFR BLD AUTO: 5.8 % — SIGNIFICANT CHANGE UP (ref 0–6)
GLUCOSE BLDC GLUCOMTR-MCNC: 133 MG/DL — HIGH (ref 70–99)
GLUCOSE BLDC GLUCOMTR-MCNC: 216 MG/DL — HIGH (ref 70–99)
GLUCOSE BLDC GLUCOMTR-MCNC: 247 MG/DL — HIGH (ref 70–99)
GLUCOSE BLDC GLUCOMTR-MCNC: 271 MG/DL — HIGH (ref 70–99)
GLUCOSE SERPL-MCNC: 270 MG/DL — HIGH (ref 70–99)
HCT VFR BLD CALC: 23.8 % — LOW (ref 34.5–45)
HGB BLD-MCNC: 7.6 G/DL — LOW (ref 11.5–15.5)
IANC: 6.58 K/UL — SIGNIFICANT CHANGE UP (ref 1.8–7.4)
IMM GRANULOCYTES NFR BLD AUTO: 1.1 % — HIGH (ref 0–0.9)
LYMPHOCYTES # BLD AUTO: 0.66 K/UL — LOW (ref 1–3.3)
LYMPHOCYTES # BLD AUTO: 7.9 % — LOW (ref 13–44)
MAGNESIUM SERPL-MCNC: 1.8 MG/DL — SIGNIFICANT CHANGE UP (ref 1.6–2.6)
MCHC RBC-ENTMCNC: 24.7 PG — LOW (ref 27–34)
MCHC RBC-ENTMCNC: 31.9 GM/DL — LOW (ref 32–36)
MCV RBC AUTO: 77.3 FL — LOW (ref 80–100)
MONOCYTES # BLD AUTO: 0.51 K/UL — SIGNIFICANT CHANGE UP (ref 0–0.9)
MONOCYTES NFR BLD AUTO: 6.1 % — SIGNIFICANT CHANGE UP (ref 2–14)
NEUTROPHILS # BLD AUTO: 6.58 K/UL — SIGNIFICANT CHANGE UP (ref 1.8–7.4)
NEUTROPHILS NFR BLD AUTO: 78.9 % — HIGH (ref 43–77)
NRBC # BLD: 0 /100 WBCS — SIGNIFICANT CHANGE UP (ref 0–0)
NRBC # FLD: 0 K/UL — SIGNIFICANT CHANGE UP (ref 0–0)
OSMOLALITY UR: 337 MOSM/KG — SIGNIFICANT CHANGE UP (ref 50–1200)
PHOSPHATE SERPL-MCNC: 4.2 MG/DL — SIGNIFICANT CHANGE UP (ref 2.5–4.5)
PLATELET # BLD AUTO: 175 K/UL — SIGNIFICANT CHANGE UP (ref 150–400)
POTASSIUM SERPL-MCNC: 4.7 MMOL/L — SIGNIFICANT CHANGE UP (ref 3.5–5.3)
POTASSIUM SERPL-SCNC: 4.7 MMOL/L — SIGNIFICANT CHANGE UP (ref 3.5–5.3)
POTASSIUM UR-SCNC: 20.9 MMOL/L — SIGNIFICANT CHANGE UP
PROT ?TM UR-MCNC: 30 MG/DL — SIGNIFICANT CHANGE UP
PROT SERPL-MCNC: 6.4 G/DL — SIGNIFICANT CHANGE UP (ref 6–8.3)
PROT/CREAT UR-RTO: 1.4 RATIO — HIGH (ref 0–0.2)
RBC # BLD: 3.08 M/UL — LOW (ref 3.8–5.2)
RBC # FLD: 17.2 % — HIGH (ref 10.3–14.5)
RH IG SCN BLD-IMP: POSITIVE — SIGNIFICANT CHANGE UP
SODIUM SERPL-SCNC: 129 MMOL/L — LOW (ref 135–145)
SODIUM UR-SCNC: 101 MMOL/L — SIGNIFICANT CHANGE UP
UUN UR-MCNC: 260.1 MG/DL — SIGNIFICANT CHANGE UP
WBC # BLD: 8.34 K/UL — SIGNIFICANT CHANGE UP (ref 3.8–10.5)
WBC # FLD AUTO: 8.34 K/UL — SIGNIFICANT CHANGE UP (ref 3.8–10.5)

## 2022-10-03 PROCEDURE — 99233 SBSQ HOSP IP/OBS HIGH 50: CPT

## 2022-10-03 PROCEDURE — 99233 SBSQ HOSP IP/OBS HIGH 50: CPT | Mod: GC

## 2022-10-03 PROCEDURE — 99222 1ST HOSP IP/OBS MODERATE 55: CPT

## 2022-10-03 RX ORDER — INSULIN GLARGINE 100 [IU]/ML
16 INJECTION, SOLUTION SUBCUTANEOUS EVERY MORNING
Refills: 0 | Status: DISCONTINUED | OUTPATIENT
Start: 2022-10-04 | End: 2022-10-07

## 2022-10-03 RX ORDER — INSULIN LISPRO 100/ML
6 VIAL (ML) SUBCUTANEOUS
Refills: 0 | Status: DISCONTINUED | OUTPATIENT
Start: 2022-10-03 | End: 2022-10-07

## 2022-10-03 RX ORDER — ACETAMINOPHEN 500 MG
650 TABLET ORAL EVERY 6 HOURS
Refills: 0 | Status: DISCONTINUED | OUTPATIENT
Start: 2022-10-03 | End: 2022-10-07

## 2022-10-03 RX ORDER — FUROSEMIDE 40 MG
40 TABLET ORAL ONCE
Refills: 0 | Status: COMPLETED | OUTPATIENT
Start: 2022-10-03 | End: 2022-10-03

## 2022-10-03 RX ADMIN — Medication 650 MILLIGRAM(S): at 18:14

## 2022-10-03 RX ADMIN — Medication 100 MILLIGRAM(S): at 05:10

## 2022-10-03 RX ADMIN — Medication 0.1 MILLIGRAM(S): at 18:15

## 2022-10-03 RX ADMIN — Medication 40 MILLIGRAM(S): at 18:14

## 2022-10-03 RX ADMIN — PANTOPRAZOLE SODIUM 40 MILLIGRAM(S): 20 TABLET, DELAYED RELEASE ORAL at 05:09

## 2022-10-03 RX ADMIN — Medication 6 UNIT(S): at 17:40

## 2022-10-03 RX ADMIN — IRON SUCROSE 210 MILLIGRAM(S): 20 INJECTION, SOLUTION INTRAVENOUS at 22:09

## 2022-10-03 RX ADMIN — Medication 0.1 MILLIGRAM(S): at 05:09

## 2022-10-03 RX ADMIN — ATORVASTATIN CALCIUM 40 MILLIGRAM(S): 80 TABLET, FILM COATED ORAL at 22:09

## 2022-10-03 RX ADMIN — AMLODIPINE BESYLATE 10 MILLIGRAM(S): 2.5 TABLET ORAL at 05:09

## 2022-10-03 RX ADMIN — Medication 3: at 12:41

## 2022-10-03 RX ADMIN — Medication 40 MILLIGRAM(S): at 05:07

## 2022-10-03 RX ADMIN — Medication 2: at 09:09

## 2022-10-03 RX ADMIN — Medication 50 MILLIGRAM(S): at 18:14

## 2022-10-03 RX ADMIN — Medication 2: at 17:40

## 2022-10-03 RX ADMIN — HEPARIN SODIUM 5000 UNIT(S): 5000 INJECTION INTRAVENOUS; SUBCUTANEOUS at 05:09

## 2022-10-03 RX ADMIN — HEPARIN SODIUM 5000 UNIT(S): 5000 INJECTION INTRAVENOUS; SUBCUTANEOUS at 18:14

## 2022-10-03 RX ADMIN — INSULIN GLARGINE 14 UNIT(S): 100 INJECTION, SOLUTION SUBCUTANEOUS at 09:10

## 2022-10-03 RX ADMIN — Medication 100 MILLIGRAM(S): at 15:10

## 2022-10-03 RX ADMIN — Medication 50 MILLIGRAM(S): at 05:08

## 2022-10-03 RX ADMIN — Medication 50 MICROGRAM(S): at 05:09

## 2022-10-03 RX ADMIN — Medication 100 MILLIGRAM(S): at 22:10

## 2022-10-03 RX ADMIN — Medication 4 UNIT(S): at 09:10

## 2022-10-03 NOTE — PROGRESS NOTE ADULT - SUBJECTIVE AND OBJECTIVE BOX
Overnight Events: None     Review Of Systems: No chest pain, shortness of breath, or palpitations            Current Meds:  amLODIPine   Tablet 10 milliGRAM(s) Oral daily  atorvastatin 40 milliGRAM(s) Oral at bedtime  cloNIDine 0.1 milliGRAM(s) Oral two times a day  dextrose 5%. 1000 milliLiter(s) IV Continuous <Continuous>  dextrose 5%. 1000 milliLiter(s) IV Continuous <Continuous>  dextrose 50% Injectable 25 Gram(s) IV Push once  dextrose 50% Injectable 12.5 Gram(s) IV Push once  dextrose 50% Injectable 25 Gram(s) IV Push once  dextrose Oral Gel 15 Gram(s) Oral once PRN  glucagon  Injectable 1 milliGRAM(s) IntraMuscular once  heparin   Injectable 5000 Unit(s) SubCutaneous every 12 hours  hydrALAZINE 100 milliGRAM(s) Oral three times a day  influenza   Vaccine 0.5 milliLiter(s) IntraMuscular once  insulin lispro (ADMELOG) corrective regimen sliding scale   SubCutaneous three times a day before meals  insulin lispro (ADMELOG) corrective regimen sliding scale   SubCutaneous at bedtime  insulin lispro Injectable (ADMELOG) 6 Unit(s) SubCutaneous three times a day before meals  iron sucrose IVPB 100 milliGRAM(s) IV Intermittent every 24 hours  labetalol 50 milliGRAM(s) Oral two times a day  levothyroxine 50 MICROGram(s) Oral daily  pantoprazole    Tablet 40 milliGRAM(s) Oral before breakfast      Vitals:  T(F): 97.6 (10-03), Max: 97.8 (10-03)  HR: 57 (10-03) (57 - 66)  BP: 118/61 (10-03) (118/61 - 140/63)  RR: 18 (10-03)  SpO2: 98% (10-03)  I&O's Summary    02 Oct 2022 07:01  -  03 Oct 2022 07:00  --------------------------------------------------------  IN: 440 mL / OUT: 600 mL / NET: -160 mL        Physical Exam:  Appearance: No acute distress; well appearing  Eyes: pink conjunctiva  HEENT: Normal oral mucosa  Cardiovascular: RRR, S1, S2, no murmurs, rubs, or gallops; 1-2+ edema; no JVD  Respiratory: Clear to auscultation bilaterally  Gastrointestinal: soft, non-tender, non-distended with normal bowel sounds  Musculoskeletal: No clubbing; no joint deformity   Neurologic: Normal speech  Psychiatry: AAOx3, mood & affect appropriate  Skin: No rashes, ecchymoses, or cyanosis                          7.6    8.34  )-----------( 175      ( 03 Oct 2022 07:05 )             23.8     10-03    129<L>  |  97<L>  |  78<H>  ----------------------------<  270<H>  4.7   |  19<L>  |  1.94<H>    Ca    8.9      03 Oct 2022 07:05  Phos  4.2     10-03  Mg     1.80     10-03    TPro  6.4  /  Alb  3.6  /  TBili  0.3  /  DBili  x   /  AST  23  /  ALT  85<H>  /  AlkPhos  318<H>  10-03      CARDIAC MARKERS ( 02 Oct 2022 06:22 )  x     / x     / x     / 74 U/L / x     / x      CARDIAC MARKERS ( 28 Sep 2022 01:57 )  26 ng/L / x     / x     / x     / x     / x      CARDIAC MARKERS ( 27 Sep 2022 23:50 )  26 ng/L / x     / x     / x     / x     / x          Serum Pro-Brain Natriuretic Peptide: 2164 pg/mL (10-02 @ 06:22)  Serum Pro-Brain Natriuretic Peptide: 6177 pg/mL (09-27 @ 23:50)

## 2022-10-03 NOTE — PROGRESS NOTE ADULT - PROBLEM SELECTOR PLAN 1
Patient with resistant secondary HTN of unclear etiology. Found to have severe pulmonary hypertension on Echo. D/dx for secondary HTN includes renal artery stenosis vs primary pulmonary HTN vs OLIVE, less likely pheochromocytoma given clinical symptoms.    - goal 120s-150s systolic    - c/w home meds w/ hold parameters: amlodipine 10mg qd, labetalol 50mg BID, clonidine 0.1mg BID, hydral 100mg TID  - plan to wean off clonidine as per Cardio recs--continue clonidine 0.1 TID  - VA Duplex Abdomen/Pelvis w/ no significant renal stenosis  - TSH elevated to 8.63, Free T4 WNL  - f/u renin/reji (recent renin/reji at [facility] WNL)  - f/w urine/plasma metanephrines Patient with resistant secondary HTN of unclear etiology. Found to have severe pulmonary hypertension on Echo. D/dx for secondary HTN includes renal artery stenosis vs primary pulmonary HTN vs OLIVE, less likely pheochromocytoma given clinical symptoms.    - goal 120s-150s systolic    - c/w amlodipine 10mg qd, labetalol 50mg BID, clonidine 0.1mg BID, hydral 100mg TID  - plan to wean off clonidine as per Cardio recs-- wean clonidine 0.1 BID  - VA Duplex Abdomen/Pelvis w/ no significant renal stenosis  - TSH elevated to 8.63, Free T4 WNL  - f/u renin/reji (recent renin/reji at [facility] WNL)  - f/w urine/plasma metanephrines

## 2022-10-03 NOTE — PROGRESS NOTE ADULT - SUBJECTIVE AND OBJECTIVE BOX
Walter Kincaid, PGY2    DATE OF SERVICE: 10-03-22 @ 11:01    Patient is a 57y old  Female who presents with a chief complaint of shortness of breath (02 Oct 2022 11:49)      SUBJECTIVE / OVERNIGHT EVENTS:    MEDICATIONS  (STANDING):  amLODIPine   Tablet 10 milliGRAM(s) Oral daily  atorvastatin 40 milliGRAM(s) Oral at bedtime  cloNIDine 0.1 milliGRAM(s) Oral three times a day  dextrose 5%. 1000 milliLiter(s) (100 mL/Hr) IV Continuous <Continuous>  dextrose 5%. 1000 milliLiter(s) (50 mL/Hr) IV Continuous <Continuous>  dextrose 50% Injectable 25 Gram(s) IV Push once  dextrose 50% Injectable 12.5 Gram(s) IV Push once  dextrose 50% Injectable 25 Gram(s) IV Push once  furosemide   Injectable 40 milliGRAM(s) IV Push every 12 hours  glucagon  Injectable 1 milliGRAM(s) IntraMuscular once  heparin   Injectable 5000 Unit(s) SubCutaneous every 12 hours  hydrALAZINE 100 milliGRAM(s) Oral three times a day  influenza   Vaccine 0.5 milliLiter(s) IntraMuscular once  insulin glargine Injectable (LANTUS) 14 Unit(s) SubCutaneous every morning  insulin lispro (ADMELOG) corrective regimen sliding scale   SubCutaneous three times a day before meals  insulin lispro (ADMELOG) corrective regimen sliding scale   SubCutaneous at bedtime  insulin lispro Injectable (ADMELOG) 4 Unit(s) SubCutaneous three times a day before meals  iron sucrose IVPB 100 milliGRAM(s) IV Intermittent every 24 hours  labetalol 50 milliGRAM(s) Oral two times a day  levothyroxine 50 MICROGram(s) Oral daily  pantoprazole    Tablet 40 milliGRAM(s) Oral before breakfast    MEDICATIONS  (PRN):  dextrose Oral Gel 15 Gram(s) Oral once PRN Blood Glucose LESS THAN 70 milliGRAM(s)/deciliter      Vital Signs Last 24 Hrs  T(C): 36.4 (03 Oct 2022 10:59), Max: 36.6 (03 Oct 2022 05:03)  T(F): 97.6 (03 Oct 2022 10:59), Max: 97.8 (03 Oct 2022 05:03)  HR: 57 (03 Oct 2022 10:59) (55 - 66)  BP: 118/61 (03 Oct 2022 10:59) (118/61 - 140/63)  BP(mean): --  RR: 18 (03 Oct 2022 10:59) (17 - 19)  SpO2: 98% (03 Oct 2022 10:59) (97% - 100%)    Parameters below as of 03 Oct 2022 10:59  Patient On (Oxygen Delivery Method): room air      CAPILLARY BLOOD GLUCOSE      POCT Blood Glucose.: 247 mg/dL (03 Oct 2022 08:35)  POCT Blood Glucose.: 266 mg/dL (02 Oct 2022 21:11)  POCT Blood Glucose.: 231 mg/dL (02 Oct 2022 17:14)  POCT Blood Glucose.: 249 mg/dL (02 Oct 2022 11:54)    I&O's Summary    02 Oct 2022 07:01  -  03 Oct 2022 07:00  --------------------------------------------------------  IN: 440 mL / OUT: 600 mL / NET: -160 mL        PHYSICAL EXAM:  GENERAL: NAD, well-developed  HEAD:  Atraumatic, Normocephalic  EYES: EOMI, PERRLA, conjunctiva and sclera clear  NECK: Supple, No JVD  CHEST/LUNG: Clear to auscultation bilaterally; No wheeze  HEART: Regular rate and rhythm; No murmurs, rubs, or gallops  ABDOMEN: Soft, Nontender, Nondistended; Bowel sounds present  EXTREMITIES:  2+ Peripheral Pulses, No clubbing, cyanosis, or edema  PSYCH: AAOx3  NEUROLOGY: non-focal  SKIN: No rashes or lesions    LABS:                        7.6    8.34  )-----------( 175      ( 03 Oct 2022 07:05 )             23.8     10-03    129<L>  |  97<L>  |  78<H>  ----------------------------<  270<H>  4.7   |  19<L>  |  1.94<H>    Ca    8.9      03 Oct 2022 07:05  Phos  4.2     10-03  Mg     1.80     10-03    TPro  6.4  /  Alb  3.6  /  TBili  0.3  /  DBili  x   /  AST  23  /  ALT  85<H>  /  AlkPhos  318<H>  10-03      CARDIAC MARKERS ( 02 Oct 2022 06:22 )  x     / x     / 74 U/L / x     / x              RADIOLOGY & ADDITIONAL TESTS:    Imaging Personally Reviewed:    Consultant(s) Notes Reviewed:      Care Discussed with Consultants/Other Providers:   Walter Kincaid, PGY2    DATE OF SERVICE: 10-03-22 @ 11:01    Patient is a 57y old  Female who presents with a chief complaint of shortness of breath (02 Oct 2022 11:49)      SUBJECTIVE / OVERNIGHT EVENTS: No acute events overnight. Patient seen and examined at bedside. Asking when she can go home. Explained patient has elevated creatinine and will need to remain in hospital. Patient notes some left chest pain after lifting herself in bed this morning. Denies dyspnea, abd pain, n/v.     MEDICATIONS  (STANDING):  amLODIPine   Tablet 10 milliGRAM(s) Oral daily  atorvastatin 40 milliGRAM(s) Oral at bedtime  cloNIDine 0.1 milliGRAM(s) Oral three times a day  dextrose 5%. 1000 milliLiter(s) (100 mL/Hr) IV Continuous <Continuous>  dextrose 5%. 1000 milliLiter(s) (50 mL/Hr) IV Continuous <Continuous>  dextrose 50% Injectable 25 Gram(s) IV Push once  dextrose 50% Injectable 12.5 Gram(s) IV Push once  dextrose 50% Injectable 25 Gram(s) IV Push once  furosemide   Injectable 40 milliGRAM(s) IV Push every 12 hours  glucagon  Injectable 1 milliGRAM(s) IntraMuscular once  heparin   Injectable 5000 Unit(s) SubCutaneous every 12 hours  hydrALAZINE 100 milliGRAM(s) Oral three times a day  influenza   Vaccine 0.5 milliLiter(s) IntraMuscular once  insulin glargine Injectable (LANTUS) 14 Unit(s) SubCutaneous every morning  insulin lispro (ADMELOG) corrective regimen sliding scale   SubCutaneous three times a day before meals  insulin lispro (ADMELOG) corrective regimen sliding scale   SubCutaneous at bedtime  insulin lispro Injectable (ADMELOG) 4 Unit(s) SubCutaneous three times a day before meals  iron sucrose IVPB 100 milliGRAM(s) IV Intermittent every 24 hours  labetalol 50 milliGRAM(s) Oral two times a day  levothyroxine 50 MICROGram(s) Oral daily  pantoprazole    Tablet 40 milliGRAM(s) Oral before breakfast    MEDICATIONS  (PRN):  dextrose Oral Gel 15 Gram(s) Oral once PRN Blood Glucose LESS THAN 70 milliGRAM(s)/deciliter      Vital Signs Last 24 Hrs  T(C): 36.4 (03 Oct 2022 10:59), Max: 36.6 (03 Oct 2022 05:03)  T(F): 97.6 (03 Oct 2022 10:59), Max: 97.8 (03 Oct 2022 05:03)  HR: 57 (03 Oct 2022 10:59) (55 - 66)  BP: 118/61 (03 Oct 2022 10:59) (118/61 - 140/63)  BP(mean): --  RR: 18 (03 Oct 2022 10:59) (17 - 19)  SpO2: 98% (03 Oct 2022 10:59) (97% - 100%)    Parameters below as of 03 Oct 2022 10:59  Patient On (Oxygen Delivery Method): room air      CAPILLARY BLOOD GLUCOSE      POCT Blood Glucose.: 247 mg/dL (03 Oct 2022 08:35)  POCT Blood Glucose.: 266 mg/dL (02 Oct 2022 21:11)  POCT Blood Glucose.: 231 mg/dL (02 Oct 2022 17:14)  POCT Blood Glucose.: 249 mg/dL (02 Oct 2022 11:54)    I&O's Summary    02 Oct 2022 07:01  -  03 Oct 2022 07:00  --------------------------------------------------------  IN: 440 mL / OUT: 600 mL / NET: -160 mL        PHYSICAL EXAM:  CONSTITUTIONAL: NAD, well-developed  RESPIRATORY: Normal respiratory effort; lungs are clear to auscultation bilaterally  CARDIOVASCULAR: Regular rate and rhythm, normal S1 and S2, no murmur/rub/gallop; 1+lower extremity edema; Peripheral pulses are 2+ bilaterally  ABDOMEN: Nontender to palpation, normoactive bowel sounds, no rebound/guarding; No hepatosplenomegaly  MUSCULOSKELETAL: BL LE swelling 1+ edema  PSYCH: A+O to person, place, and time; affect appropriate    LABS:                        7.6    8.34  )-----------( 175      ( 03 Oct 2022 07:05 )             23.8     10-03    129<L>  |  97<L>  |  78<H>  ----------------------------<  270<H>  4.7   |  19<L>  |  1.94<H>    Ca    8.9      03 Oct 2022 07:05  Phos  4.2     10-03  Mg     1.80     10-03    TPro  6.4  /  Alb  3.6  /  TBili  0.3  /  DBili  x   /  AST  23  /  ALT  85<H>  /  AlkPhos  318<H>  10-03      CARDIAC MARKERS ( 02 Oct 2022 06:22 )  x     / x     / 74 U/L / x     / x              RADIOLOGY & ADDITIONAL TESTS:    Imaging Personally Reviewed:    Consultant(s) Notes Reviewed:      Care Discussed with Consultants/Other Providers:

## 2022-10-03 NOTE — PROGRESS NOTE ADULT - PROBLEM SELECTOR PLAN 2
Acute decompensated heart failure.   On admission, probnp. Echo: EF: 64%, elevated right sided pressures consistent with severe pulmonary hypertension, but no LV dysfunction   - s/p diuresis w/ 40mg IV lasix 9/30 --> increased to 80 mg IV lasix on 10/1 daily  - Cards: repeat Echo when euvolemic to reassess pulmonary hypertension as patient is fluid overloaded   - Strict I's and O's  - Goal of Mg >2, K >4. Acute decompensated heart failure.   On admission, probnp. Echo: EF: 64%, elevated right sided pressures consistent with severe pulmonary hypertension, but no LV dysfunction   - hold off diuresis given worsening ISAMAR  - Cards: repeat Echo when euvolemic to reassess pulmonary hypertension as patient is fluid overloaded   - Strict I's and O's  - Goal of Mg >2, K >4.

## 2022-10-03 NOTE — PROGRESS NOTE ADULT - ASSESSMENT
58yo F with PMH of HTN, HLD, DM2, anemia presenting w resistant hypertension resulting in SOB c/f flash pulm edema. Now BP well controlled. Patient is still volume overloaded, requiring diuresis. Patient also anemic w/ transfusion 9/29. Increased lasxi to 80 mg IV daily on 10/1 and also increased lispro from 2u to 4u premeal.  56yo F with PMH of HTN, HLD, DM2, anemia presenting w resistant hypertension resulting in SOB c/f flash pulm edema. Now BP well controlled. Patient is still volume overloaded, requiring diuresis. Patient also anemic w/ transfusion 9/29. Nephrology consulted for ISAMAR. Adjusting BP meds as tolerated.

## 2022-10-03 NOTE — PROGRESS NOTE ADULT - ASSESSMENT
The patient is a 56yo F with PMH of HTN, HLD, DM2, anemia who presented to the ED for progressive and severe resp distress, with hypoxemic respiratory failure and symptoms of heart failure exacerbation.    Recommendations  - Continue Lasix 40mg IV daily, goal net negative 1-2L over 24hrs, can transition to PO Lasix tomorrow   - Monitor on Telemetry  - Keep K > 4, Mag > 2  - TTE 9/28 with nl LVEF, mildly dilated LA, severe pHTN (RVSP 68mmHg)  - Will repeat TTE to reassess pulmonary pressures once more euvolemic   - Continue labetalol, hydral/isordil for afterload control. Prioritize weaning off clonidine  - Consider secondary hypertension workup    Mireya Harrell MD  Cardiology Fellow     Recommendations are preliminary until cosigned by attending      The patient is a 58yo F with PMH of HTN, HLD, DM2, anemia who presented to the ED for progressive and severe resp distress, with hypoxemic respiratory failure and symptoms of heart failure exacerbation.    Recommendations  - Continue Lasix 40mg IV daily, goal net negative 1-2L over 24hrs, can transition to PO Lasix tomorrow   - Monitor on Telemetry  - Keep K > 4, Mag > 2  - TTE 9/28 with nl LVEF, mildly dilated LA, severe pHTN (RVSP 68mmHg)  - Will repeat TTE to reassess pulmonary pressures once more euvolemic   - Continue labetalol, hydral/isordil for afterload control. Prioritize weaning off clonidine  - Consider secondary hypertension workup    Mireya Harrell MD  Cardiology Fellow     Recommendations are preliminary until cosigned by attending     This patient was seen and examined personally by me and the plan was discussed with the fellow and/or resident above. Amendments were made as necessary to the above. Agree with the excellent note and plan above. Hypertensive management and TTE pend as above once euvolemic.    Franklin Marie MD, MPhil, [facility]  Cardiologist, [facility]  ; [facility] and [facility]  Email: [email or web address] on-service contact/call information, go to [email or web address] and use "[email or web address]" to login.  Outpatient Cardiology appointments, call [phone] to arrange with a colleague; I do not have outpatient Cardiology clinic.

## 2022-10-03 NOTE — PROGRESS NOTE ADULT - PROBLEM SELECTOR PLAN 3
Worsening Scr to 1.7. likely sequelae 2/2 to hypertensive emergency. Cannot r/o underlying CKD  - unknown baseline, elevated at last hospitalization  - per [facility] records Creatinine 1.83 at discharge 9/16/22  - VA Duplex Abdomen/Pelvis w/o concern for renal artery stenosis  - Urine Na and Urine Cr WNL  - U/A unremarkable   - CTM Bun/Cr. Worsening Scr to 1.7. likely sequelae 2/2 to hypertensive emergency. Cannot r/o underlying CKD  - unknown baseline, elevated at last hospitalization  - per [facility] records Creatinine 1.83  9/16/22, but baseline 1.2-1.3  - VA Duplex Abdomen/Pelvis w/o concern for renal artery stenosis  - Urine Na and Urine Cr WNL  - U/A unremarkable   - CTM Bun/Cr.  - FeUREA 29%-> indicative of pre-renal ISAMAR, hold diuretics 10/3

## 2022-10-03 NOTE — PROGRESS NOTE ADULT - ATTENDING COMMENTS
57 y.o. F w/ a hx of HTN, DM2, hypothyroidism hospitalized for ADHF 2/2 HTN emergency.     Patient feels well, wants to go home. PE 2+ LE edema, stable. Labs w/ Cr 1.9.     # HTN emergency c/b ADHF: BP improved. Lower clonidine to .1mg BID, cont home meds. Follow up renin/reji, plasma metanephrines. Will continue diuresis.   # ISAMAR: Baseline 1.3 per OSH records, worsening. Unclear overall picture- appears hypervolemic though BNP decreasing and Cr appears to be up-trending despite diuresis. Hold further diuresis for now.   # DM2: Increase Lantus 16 units + Lispro 6 w/ meals+ SSI  # Transaminitis: Suspect hepatic congestion, improving.   # Hypothyroidism: TSH elevated 8.63- pt reports taking it in AM and takes a PPI soon after. Discussed importance of taking Synthroid on it's own on an empty stomach.   # Microcytic anemia: Cont iron repletion. Follow up OP GI for colonoscopy. Patient seen and examined on 10/3 at 1200, note written shortly after however saved in incomplete state. Saved as complete on 10/4    57 y.o. F w/ a hx of HTN, DM2, hypothyroidism hospitalized for ADHF 2/2 HTN emergency.     Patient feels well, wants to go home. PE 2+ LE edema, stable. Labs w/ Cr 1.9.     # HTN emergency c/b ADHF: BP improved. Lower clonidine to .1mg BID, cont home meds. Follow up renin/reji, plasma metanephrines. Will continue diuresis.   # ISAMAR: Baseline 1.3 per OSH records, worsening. Unclear overall picture- appears hypervolemic though BNP decreasing and Cr appears to be up-trending despite diuresis. Hold further diuresis for now.   # DM2: Increase Lantus 16 units + Lispro 6 w/ meals+ SSI  # Transaminitis: Suspect hepatic congestion, improving.   # Hypothyroidism: TSH elevated 8.63- pt reports taking it in AM and takes a PPI soon after. Discussed importance of taking Synthroid on it's own on an empty stomach.   # Microcytic anemia: Cont iron repletion. Follow up OP GI for colonoscopy.

## 2022-10-03 NOTE — PROGRESS NOTE ADULT - PROBLEM SELECTOR PLAN 4
Per pt takes 20 of levemir depending on FSGs at home. Missed night of admission  - Increasing FSGs over past 24 hours   - 14u lantus  - increased premeal from2 units to 4 units  - c/w low dose sliding scale  - ctm blood sugars for adjustment Per pt takes 20 of levemir depending on FSGs at home. Missed night of admission  - Increasing FSGs over past 24 hours   - 16u lantus  - increased premeal from to 6 units  - c/w low dose sliding scale  - ctm blood sugars for adjustment

## 2022-10-04 LAB
ALBUMIN SERPL ELPH-MCNC: 3.8 G/DL — SIGNIFICANT CHANGE UP (ref 3.3–5)
ALP SERPL-CCNC: 323 U/L — HIGH (ref 40–120)
ALT FLD-CCNC: 74 U/L — HIGH (ref 4–33)
ANION GAP SERPL CALC-SCNC: 13 MMOL/L — SIGNIFICANT CHANGE UP (ref 7–14)
AST SERPL-CCNC: 25 U/L — SIGNIFICANT CHANGE UP (ref 4–32)
BASOPHILS # BLD AUTO: 0.02 K/UL — SIGNIFICANT CHANGE UP (ref 0–0.2)
BASOPHILS NFR BLD AUTO: 0.2 % — SIGNIFICANT CHANGE UP (ref 0–2)
BILIRUB SERPL-MCNC: 0.3 MG/DL — SIGNIFICANT CHANGE UP (ref 0.2–1.2)
BUN SERPL-MCNC: 81 MG/DL — HIGH (ref 7–23)
CALCIUM SERPL-MCNC: 9.1 MG/DL — SIGNIFICANT CHANGE UP (ref 8.4–10.5)
CHLORIDE SERPL-SCNC: 99 MMOL/L — SIGNIFICANT CHANGE UP (ref 98–107)
CO2 SERPL-SCNC: 21 MMOL/L — LOW (ref 22–31)
CREAT SERPL-MCNC: 1.96 MG/DL — HIGH (ref 0.5–1.3)
EGFR: 29 ML/MIN/1.73M2 — LOW
EOSINOPHIL # BLD AUTO: 0.72 K/UL — HIGH (ref 0–0.5)
EOSINOPHIL NFR BLD AUTO: 8.1 % — HIGH (ref 0–6)
GLUCOSE BLDC GLUCOMTR-MCNC: 113 MG/DL — HIGH (ref 70–99)
GLUCOSE BLDC GLUCOMTR-MCNC: 169 MG/DL — HIGH (ref 70–99)
GLUCOSE BLDC GLUCOMTR-MCNC: 183 MG/DL — HIGH (ref 70–99)
GLUCOSE BLDC GLUCOMTR-MCNC: 187 MG/DL — HIGH (ref 70–99)
GLUCOSE BLDC GLUCOMTR-MCNC: 97 MG/DL — SIGNIFICANT CHANGE UP (ref 70–99)
GLUCOSE SERPL-MCNC: 97 MG/DL — SIGNIFICANT CHANGE UP (ref 70–99)
HCT VFR BLD CALC: 25.9 % — LOW (ref 34.5–45)
HGB BLD-MCNC: 8.3 G/DL — LOW (ref 11.5–15.5)
IANC: 6.51 K/UL — SIGNIFICANT CHANGE UP (ref 1.8–7.4)
IMM GRANULOCYTES NFR BLD AUTO: 0.8 % — SIGNIFICANT CHANGE UP (ref 0–0.9)
LYMPHOCYTES # BLD AUTO: 0.98 K/UL — LOW (ref 1–3.3)
LYMPHOCYTES # BLD AUTO: 11 % — LOW (ref 13–44)
MAGNESIUM SERPL-MCNC: 1.7 MG/DL — SIGNIFICANT CHANGE UP (ref 1.6–2.6)
MCHC RBC-ENTMCNC: 24.8 PG — LOW (ref 27–34)
MCHC RBC-ENTMCNC: 32 GM/DL — SIGNIFICANT CHANGE UP (ref 32–36)
MCV RBC AUTO: 77.3 FL — LOW (ref 80–100)
METANEPHRINE, PL: 27.4 PG/ML — SIGNIFICANT CHANGE UP (ref 0–88)
MONOCYTES # BLD AUTO: 0.59 K/UL — SIGNIFICANT CHANGE UP (ref 0–0.9)
MONOCYTES NFR BLD AUTO: 6.6 % — SIGNIFICANT CHANGE UP (ref 2–14)
NEUTROPHILS # BLD AUTO: 6.51 K/UL — SIGNIFICANT CHANGE UP (ref 1.8–7.4)
NEUTROPHILS NFR BLD AUTO: 73.3 % — SIGNIFICANT CHANGE UP (ref 43–77)
NORMETANEPHRINE, PL: 141.9 PG/ML — SIGNIFICANT CHANGE UP (ref 0–244)
NRBC # BLD: 0 /100 WBCS — SIGNIFICANT CHANGE UP (ref 0–0)
NRBC # FLD: 0 K/UL — SIGNIFICANT CHANGE UP (ref 0–0)
PHOSPHATE SERPL-MCNC: 4.3 MG/DL — SIGNIFICANT CHANGE UP (ref 2.5–4.5)
PLATELET # BLD AUTO: 180 K/UL — SIGNIFICANT CHANGE UP (ref 150–400)
POTASSIUM SERPL-MCNC: 4.5 MMOL/L — SIGNIFICANT CHANGE UP (ref 3.5–5.3)
POTASSIUM SERPL-SCNC: 4.5 MMOL/L — SIGNIFICANT CHANGE UP (ref 3.5–5.3)
PROT SERPL-MCNC: 6.7 G/DL — SIGNIFICANT CHANGE UP (ref 6–8.3)
RBC # BLD: 3.35 M/UL — LOW (ref 3.8–5.2)
RBC # FLD: 17.4 % — HIGH (ref 10.3–14.5)
RENIN PLAS-CCNC: 0.78 NG/ML/HR — SIGNIFICANT CHANGE UP (ref 0.17–5.38)
SARS-COV-2 RNA SPEC QL NAA+PROBE: SIGNIFICANT CHANGE UP
SODIUM SERPL-SCNC: 133 MMOL/L — LOW (ref 135–145)
WBC # BLD: 8.89 K/UL — SIGNIFICANT CHANGE UP (ref 3.8–10.5)
WBC # FLD AUTO: 8.89 K/UL — SIGNIFICANT CHANGE UP (ref 3.8–10.5)

## 2022-10-04 PROCEDURE — 99233 SBSQ HOSP IP/OBS HIGH 50: CPT | Mod: GC

## 2022-10-04 PROCEDURE — 99232 SBSQ HOSP IP/OBS MODERATE 35: CPT | Mod: GC

## 2022-10-04 RX ORDER — FUROSEMIDE 40 MG
80 TABLET ORAL EVERY 12 HOURS
Refills: 0 | Status: COMPLETED | OUTPATIENT
Start: 2022-10-04 | End: 2022-10-06

## 2022-10-04 RX ADMIN — HEPARIN SODIUM 5000 UNIT(S): 5000 INJECTION INTRAVENOUS; SUBCUTANEOUS at 18:05

## 2022-10-04 RX ADMIN — Medication 50 MICROGRAM(S): at 05:20

## 2022-10-04 RX ADMIN — Medication 1: at 12:05

## 2022-10-04 RX ADMIN — Medication 6 UNIT(S): at 12:05

## 2022-10-04 RX ADMIN — Medication 100 MILLIGRAM(S): at 13:53

## 2022-10-04 RX ADMIN — AMLODIPINE BESYLATE 10 MILLIGRAM(S): 2.5 TABLET ORAL at 05:20

## 2022-10-04 RX ADMIN — ATORVASTATIN CALCIUM 40 MILLIGRAM(S): 80 TABLET, FILM COATED ORAL at 21:22

## 2022-10-04 RX ADMIN — Medication 0.1 MILLIGRAM(S): at 05:20

## 2022-10-04 RX ADMIN — PANTOPRAZOLE SODIUM 40 MILLIGRAM(S): 20 TABLET, DELAYED RELEASE ORAL at 05:20

## 2022-10-04 RX ADMIN — Medication 80 MILLIGRAM(S): at 18:04

## 2022-10-04 RX ADMIN — INSULIN GLARGINE 16 UNIT(S): 100 INJECTION, SOLUTION SUBCUTANEOUS at 08:42

## 2022-10-04 RX ADMIN — Medication 80 MILLIGRAM(S): at 10:59

## 2022-10-04 RX ADMIN — Medication 100 MILLIGRAM(S): at 21:22

## 2022-10-04 RX ADMIN — Medication 6 UNIT(S): at 08:42

## 2022-10-04 RX ADMIN — HEPARIN SODIUM 5000 UNIT(S): 5000 INJECTION INTRAVENOUS; SUBCUTANEOUS at 05:20

## 2022-10-04 RX ADMIN — Medication 50 MILLIGRAM(S): at 18:03

## 2022-10-04 RX ADMIN — Medication 100 MILLIGRAM(S): at 05:20

## 2022-10-04 RX ADMIN — Medication 6 UNIT(S): at 18:05

## 2022-10-04 RX ADMIN — Medication 0.1 MILLIGRAM(S): at 18:04

## 2022-10-04 NOTE — PROGRESS NOTE ADULT - PROBLEM SELECTOR PLAN 2
Acute decompensated heart failure.   On admission, probnp. Echo: EF: 64%, elevated right sided pressures consistent with severe pulmonary hypertension, but no LV dysfunction   - hold off diuresis given worsening ISAMAR  - Cards: repeat Echo when euvolemic to reassess pulmonary hypertension as patient is fluid overloaded   - Strict I's and O's  - Goal of Mg >2, K >4. Acute decompensated heart failure.   On admission, probnp. Echo: EF: 64%, elevated right sided pressures consistent with severe pulmonary hypertension, but no LV dysfunction   - diurese with 80mg IV lasix today  - Cards: repeat Echo when euvolemic to reassess pulmonary hypertension as patient is fluid overloaded   - Strict I's and O's  - Goal of Mg >2, K >4.

## 2022-10-04 NOTE — PROGRESS NOTE ADULT - ATTENDING COMMENTS
Pt. with ISAMAR on likely CKD. Pt. seen and examined in [facility] earlier today. Pt. feels better. No fever, CP or SOB during rounds today. B/L LE edema has decreased, with diuretic therapy. Scr elevated/stable at 1.96 and SNa has improved to 133 today. Assessment and plan for ISAMAR as outlined above. Monitor labs and urine output. Avoid any potential nephrotoxins. Dose medications as per eGFR.

## 2022-10-04 NOTE — PROGRESS NOTE ADULT - PROBLEM SELECTOR PLAN 1
Pt. with non-oliguric ISAMAR on likely CKD in the setting of acute on chronic CHF & severe PHTN causing renal venous congestion. Scr on admission was 1.4 on 9/27, which peaked to 1.70 on 9/29 & has stayed between 1.4-1.7 since then. Latest Scr is 1.75. No prior labs on [facility]. Last Lasix IV dose was on 9/30. Pro-BNP trended down from 6000 to 2000. UA with 300 mg/dl protein, trace blood, glucose. Send urine electrolytes including urine urea, spot urine TP/CR. Check CPK. Check bladder scan & Renal US. Recommend Singh catheter placement if retaining. Pt appears volume overloaded with bibasilar crackles, +JVD & tense LE edema. Would start lasix 40 mg IV bid.  Monitor labs and urine output. Daily weight. Avoid NSAIDs, ACEI/ARBS, RCA and nephrotoxins. Dose medications as per eGFR. Pt. with non-oliguric ISAMAR on likely CKD in the setting of acute on chronic CHF & severe PHTN causing renal venous congestion. Scr on admission was 1.4 on 9/27, which peaked to 1.90 on 10/03/22. No prior labs on [facility]. Pt currently on IV lasix 40 mg BID for volume overload. Last Scr elevated/stable at 1.96 today. Pt. clinically stable. Pt. says she feels better and LE swelling is improving. Recommend continue IV Lasix 40 mg BID. Monitor labs and urine output. Daily weight. Avoid NSAIDs, ACEI/ARBS, RCA and nephrotoxins. Dose medications as per eGFR. Pt. with non-oliguric ISAMAR on likely CKD in the setting of CHF. Scr on admission was 1.4 on 9/27, increased to 1.90 on 10/3/22. No prior labs on [facility]. Pt currently on IV Lasix 40 mg BID for volume overload/LE edema management. Scr elevated/stable at 1.96 today. Pt. clinically stable. Recommend to switch IV Lasix 40 mg BID to oral Torsemide 40 mg once daily upon discharge. Monitor labs and urine output. Daily weight. Avoid NSAIDs, ACEI/ARBS, RCA and nephrotoxins. Dose medications as per eGFR.

## 2022-10-04 NOTE — PROGRESS NOTE ADULT - ATTENDING COMMENTS
57 y.o. F w/ a hx of HTN, DM2, hypothyroidism hospitalized for ADHF 2/2 HTN emergency.     Patient feels well, wants to go home. PE 2+ LE edema, stable. Labs w/ Cr 1.96    # HTN emergency c/b ADHF: BP improved. Lower clonidine to .1mg BID, cont home meds. Follow up renin/reji, plasma metanephrines. Will continue diuresis.   # ISAMAR: Baseline 1.3 per OSH records, will obtain remainder of records. Still appears edematous. Cont diuresis today.   # DM2: Cont Lantus 14 units + Lispro 4 w/ meals+ SSI  # Transaminitis: Suspect hepatic congestion, improving with diuresis, continue.   # Hypothyroidism: TSH elevated 8.63- pt reports taking it in AM and takes a PPI soon after. Discussed importance of taking Synthroid on it's own on an empty stomach.   # Microcytic anemia: Cont iron repletion. Follow up OP GI for colonoscopy.

## 2022-10-04 NOTE — PROGRESS NOTE ADULT - SUBJECTIVE AND OBJECTIVE BOX
[facility] DIVISION OF KIDNEY DISEASES AND HYPERTENSION   FOLLOW UP NOTE    --------------------------------------------------------------------------------  HPI: 57-year-old female with PMH of HTN, HLD, DM2, anemia presents to ED for progressive and worsening SOB. Pt on admission was found to have volume overload. Initial work up in [facility] showed elevated Scr. Nephrology following for ISAMAR. .     Scr on admission was 1.4 on 9/27, and increased to 1.70 on 9/29. Pt was started on IV diuretics (lasix) for volume overload. Pt. says she was recently advised to see a nephrologist as outpatient. No prior labs on [facility]. Pt gives history recent admission at [facility] for similar complaints.     Patient seen & examined today in [facility]. Last Scr elevated/stable at 1.9. Pt. feels better but gives history of B/L LE edema. Denies chest pain, fever, chills, dysuria and hematuria.     PAST HISTORY  --------------------------------------------------------------------------------  No significant changes to PMH, PSH, FHx, SHx, unless otherwise noted    ALLERGIES & MEDICATIONS  --------------------------------------------------------------------------------  Allergies  No Known Allergies    Intolerances    Standing Inpatient Medications  amLODIPine   Tablet 10 milliGRAM(s) Oral daily  atorvastatin 40 milliGRAM(s) Oral at bedtime  cloNIDine 0.1 milliGRAM(s) Oral two times a day  dextrose 5%. 1000 milliLiter(s) IV Continuous <Continuous>  dextrose 5%. 1000 milliLiter(s) IV Continuous <Continuous>  dextrose 50% Injectable 25 Gram(s) IV Push once  dextrose 50% Injectable 12.5 Gram(s) IV Push once  dextrose 50% Injectable 25 Gram(s) IV Push once  glucagon  Injectable 1 milliGRAM(s) IntraMuscular once  heparin   Injectable 5000 Unit(s) SubCutaneous every 12 hours  hydrALAZINE 100 milliGRAM(s) Oral three times a day  influenza   Vaccine 0.5 milliLiter(s) IntraMuscular once  insulin glargine Injectable (LANTUS) 16 Unit(s) SubCutaneous every morning  insulin lispro (ADMELOG) corrective regimen sliding scale   SubCutaneous three times a day before meals  insulin lispro (ADMELOG) corrective regimen sliding scale   SubCutaneous at bedtime  insulin lispro Injectable (ADMELOG) 6 Unit(s) SubCutaneous three times a day before meals  labetalol 50 milliGRAM(s) Oral two times a day  levothyroxine 50 MICROGram(s) Oral daily  pantoprazole    Tablet 40 milliGRAM(s) Oral before breakfast    PRN Inpatient Medications  acetaminophen     Tablet .. 650 milliGRAM(s) Oral every 6 hours PRN  dextrose Oral Gel 15 Gram(s) Oral once PRN      REVIEW OF SYSTEMS  --------------------------------------------------------------------------------  Gen: No fevers/chills  Head/Eyes/Ears: No HA   Respiratory: No dyspnea, cough  CV: No chest pain  GI: No abdominal pain, diarrhea  : No dysuria, hematuria  MSK: No  edema  Skin: No rashes  Heme: No easy bruising or bleeding    All other systems were reviewed and are negative, except as noted.    VITALS/PHYSICAL EXAM  --------------------------------------------------------------------------------  T(C): 36.6 (10-04-22 @ 05:03), Max: 36.6 (10-03-22 @ 18:21)  HR: 60 (10-04-22 @ 05:03) (57 - 66)  BP: 133/55 (10-04-22 @ 05:03) (118/61 - 133/55)  RR: 18 (10-04-22 @ 05:03) (17 - 18)  SpO2: 100% (10-04-22 @ 05:03) (98% - 100%)  Wt(kg): --    Physical Exam:  	Gen: NAD  	HEENT: Anicteric  	Pulm: CTA B/L  	CV: S1S2+  	Abd: Soft, +BS      	Ext: B/L LE edema + (improved)  	Neuro: Awake        	Skin: Warm and dry    LABS/STUDIES  --------------------------------------------------------------------------------              8.3    8.89  >-----------<  180      [10-04-22 @ 05:20]              25.9     133  |  99  |  81  ----------------------------<  97      [10-04-22 @ 05:20]  4.5   |  21  |  1.96        Ca     9.1     [10-04-22 @ 05:20]      Mg     1.70     [10-04-22 @ 05:20]      Phos  4.3     [10-04-22 @ 05:20]    Creatinine Trend:  SCr 1.96 [10-04 @ 05:20]  SCr 1.94 [10-03 @ 07:05]  SCr 1.75 [10-02 @ 06:22]  SCr 1.65 [10-01 @ 06:35]  SCr 1.54 [09-30 @ 06:20]    Urinalysis - [09-28-22 @ 07:43]      Color Light Yellow / Appearance Clear / SG 1.014 / pH 6.0      Gluc >= 1000 mg/dL / Ketone Negative  / Bili Negative / Urobili <2 mg/dL       Blood Trace / Protein 300 mg/dL / Leuk Est Negative / Nitrite Negative      RBC 4 / WBC 1 / Hyaline 1 / Gran  / Sq Epi  / Non Sq Epi 1 / Bacteria Negative    Urine Creatinine 22      [10-02-22 @ 23:29]  Urine Protein 30      [10-02-22 @ 23:29]  Urine Sodium 101      [10-02-22 @ 23:29]  Urine Urea Nitrogen 260.1      [10-02-22 @ 23:29]  Urine Potassium 20.9      [10-02-22 @ 23:29]  Urine Chloride 102      [10-02-22 @ 23:29]  Urine Osmolality 337      [10-02-22 @ 23:29] [facility] DIVISION OF KIDNEY DISEASES AND HYPERTENSION   FOLLOW UP NOTE    --------------------------------------------------------------------------------  HPI: 57-year-old female with PMH of HTN, HLD, DM2, anemia presents to ED for progressive and worsening SOB. Pt on admission was found to have volume overload. Initial work up in [facility] showed elevated Scr. Nephrology following for ISAMAR. .     Scr on admission was 1.4 on 9/27, and increased to 1.70 on 9/29. Pt was started on IV diuretics (lasix) for volume overload. Pt. says she was recently advised to see a nephrologist as outpatient. No prior labs on [facility]. Pt gives history recent admission at [facility] for similar complaints.     Patient seen & examined today in [facility]. Last Scr elevated/stable at 1.9. Pt. feels better but gives history of B/L LE edema. Denies chest pain, fever, chills, dysuria and hematuria.     PAST HISTORY  --------------------------------------------------------------------------------  No significant changes to PMH, PSH, FHx, SHx, unless otherwise noted    ALLERGIES & MEDICATIONS  --------------------------------------------------------------------------------  Allergies  No Known Allergies    Intolerances    Standing Inpatient Medications  amLODIPine   Tablet 10 milliGRAM(s) Oral daily  atorvastatin 40 milliGRAM(s) Oral at bedtime  cloNIDine 0.1 milliGRAM(s) Oral two times a day  dextrose 5%. 1000 milliLiter(s) IV Continuous <Continuous>  dextrose 5%. 1000 milliLiter(s) IV Continuous <Continuous>  dextrose 50% Injectable 25 Gram(s) IV Push once  dextrose 50% Injectable 12.5 Gram(s) IV Push once  dextrose 50% Injectable 25 Gram(s) IV Push once  glucagon  Injectable 1 milliGRAM(s) IntraMuscular once  heparin   Injectable 5000 Unit(s) SubCutaneous every 12 hours  hydrALAZINE 100 milliGRAM(s) Oral three times a day  influenza   Vaccine 0.5 milliLiter(s) IntraMuscular once  insulin glargine Injectable (LANTUS) 16 Unit(s) SubCutaneous every morning  insulin lispro (ADMELOG) corrective regimen sliding scale   SubCutaneous three times a day before meals  insulin lispro (ADMELOG) corrective regimen sliding scale   SubCutaneous at bedtime  insulin lispro Injectable (ADMELOG) 6 Unit(s) SubCutaneous three times a day before meals  labetalol 50 milliGRAM(s) Oral two times a day  levothyroxine 50 MICROGram(s) Oral daily  pantoprazole    Tablet 40 milliGRAM(s) Oral before breakfast    PRN Inpatient Medications  acetaminophen     Tablet .. 650 milliGRAM(s) Oral every 6 hours PRN  dextrose Oral Gel 15 Gram(s) Oral once PRN      REVIEW OF SYSTEMS  --------------------------------------------------------------------------------  Gen: No fevers/chills  Head/Eyes/Ears: No HA   Respiratory: dyspnea (on admission), no cough  CV: No chest pain  GI: No abdominal pain, diarrhea  : No dysuria, hematuria  MSK: LE edema  Skin: No rashes  Heme: No easy bruising or bleeding    All other systems were reviewed and are negative, except as noted.    VITALS/PHYSICAL EXAM  --------------------------------------------------------------------------------  T(C): 36.6 (10-04-22 @ 05:03), Max: 36.6 (10-03-22 @ 18:21)  HR: 60 (10-04-22 @ 05:03) (57 - 66)  BP: 133/55 (10-04-22 @ 05:03) (118/61 - 133/55)  RR: 18 (10-04-22 @ 05:03) (17 - 18)  SpO2: 100% (10-04-22 @ 05:03) (98% - 100%)  Wt(kg): --    Physical Exam:  	Gen: NAD  	HEENT: Anicteric  	Pulm: CTA B/L  	CV: S1S2+  	Abd: Soft, +BS      	Ext: B/L LE edema + (improved)  	Neuro: Awake        	Skin: Warm and dry    LABS/STUDIES  --------------------------------------------------------------------------------              8.3    8.89  >-----------<  180      [10-04-22 @ 05:20]              25.9     133  |  99  |  81  ----------------------------<  97      [10-04-22 @ 05:20]  4.5   |  21  |  1.96        Ca     9.1     [10-04-22 @ 05:20]      Mg     1.70     [10-04-22 @ 05:20]      Phos  4.3     [10-04-22 @ 05:20]    Creatinine Trend:  SCr 1.96 [10-04 @ 05:20]  SCr 1.94 [10-03 @ 07:05]  SCr 1.75 [10-02 @ 06:22]  SCr 1.65 [10-01 @ 06:35]  SCr 1.54 [09-30 @ 06:20]    Urinalysis - [09-28-22 @ 07:43]      Color Light Yellow / Appearance Clear / SG 1.014 / pH 6.0      Gluc >= 1000 mg/dL / Ketone Negative  / Bili Negative / Urobili <2 mg/dL       Blood Trace / Protein 300 mg/dL / Leuk Est Negative / Nitrite Negative      RBC 4 / WBC 1 / Hyaline 1 / Gran  / Sq Epi  / Non Sq Epi 1 / Bacteria Negative    Urine Creatinine 22      [10-02-22 @ 23:29]  Urine Protein 30      [10-02-22 @ 23:29]  Urine Sodium 101      [10-02-22 @ 23:29]  Urine Urea Nitrogen 260.1      [10-02-22 @ 23:29]  Urine Potassium 20.9      [10-02-22 @ 23:29]  Urine Chloride 102      [10-02-22 @ 23:29]  Urine Osmolality 337      [10-02-22 @ 23:29] [facility] DIVISION OF KIDNEY DISEASES AND HYPERTENSION   FOLLOW UP NOTE    --------------------------------------------------------------------------------  HPI: 57-year-old female with PMH of HTN, HLD, DM2, anemia presents to ED for progressive and worsening SOB. Pt on admission was found to have volume overload. Initial work up in [facility] showed elevated Scr. Nephrology following for ISAMAR. .     Scr on admission was 1.4 on 9/27, and increased to 1.70 on 9/29. Pt was started on IV diuretics (lasix) for volume overload. Pt. says she was recently advised to see a nephrologist as outpatient. No prior labs on [facility]. Pt gives history recent admission at [facility] for similar complaints.     Patient seen & examined today in [facility]. Pt. feels better but gives history of B/L LE edema. Denies chest pain, fever, chills, dysuria and hematuria. Scr elevated/stable at 1.9 today.    PAST HISTORY  --------------------------------------------------------------------------------  No significant changes to PMH, PSH, FHx, SHx, unless otherwise noted    ALLERGIES & MEDICATIONS  --------------------------------------------------------------------------------  Allergies  No Known Allergies    Intolerances    Standing Inpatient Medications  amLODIPine   Tablet 10 milliGRAM(s) Oral daily  atorvastatin 40 milliGRAM(s) Oral at bedtime  cloNIDine 0.1 milliGRAM(s) Oral two times a day  dextrose 5%. 1000 milliLiter(s) IV Continuous <Continuous>  dextrose 5%. 1000 milliLiter(s) IV Continuous <Continuous>  dextrose 50% Injectable 25 Gram(s) IV Push once  dextrose 50% Injectable 12.5 Gram(s) IV Push once  dextrose 50% Injectable 25 Gram(s) IV Push once  glucagon  Injectable 1 milliGRAM(s) IntraMuscular once  heparin   Injectable 5000 Unit(s) SubCutaneous every 12 hours  hydrALAZINE 100 milliGRAM(s) Oral three times a day  influenza   Vaccine 0.5 milliLiter(s) IntraMuscular once  insulin glargine Injectable (LANTUS) 16 Unit(s) SubCutaneous every morning  insulin lispro (ADMELOG) corrective regimen sliding scale   SubCutaneous three times a day before meals  insulin lispro (ADMELOG) corrective regimen sliding scale   SubCutaneous at bedtime  insulin lispro Injectable (ADMELOG) 6 Unit(s) SubCutaneous three times a day before meals  labetalol 50 milliGRAM(s) Oral two times a day  levothyroxine 50 MICROGram(s) Oral daily  pantoprazole    Tablet 40 milliGRAM(s) Oral before breakfast    PRN Inpatient Medications  acetaminophen     Tablet .. 650 milliGRAM(s) Oral every 6 hours PRN  dextrose Oral Gel 15 Gram(s) Oral once PRN    REVIEW OF SYSTEMS  --------------------------------------------------------------------------------  Gen: No fever  Head/Eyes/Ears: No HA   Respiratory: dyspnea (on admission), no cough  CV: No chest pain  GI: No abdominal pain, diarrhea  : No dysuria, hematuria  MSK: LE edema+  Skin: No rashes  Heme: No easy bruising or bleeding    All other systems were reviewed and are negative, except as noted.    VITALS/PHYSICAL EXAM  --------------------------------------------------------------------------------  T(C): 36.6 (10-04-22 @ 05:03), Max: 36.6 (10-03-22 @ 18:21)  HR: 60 (10-04-22 @ 05:03) (57 - 66)  BP: 133/55 (10-04-22 @ 05:03) (118/61 - 133/55)  RR: 18 (10-04-22 @ 05:03) (17 - 18)  SpO2: 100% (10-04-22 @ 05:03) (98% - 100%)  Wt(kg): --    Physical Exam:  	Gen: resting, NAD  	HEENT: Anicteric  	Pulm: CTA B/L  	CV: S1S2+  	Abd: Soft, +BS      	Ext: Decreased B/L LE edema+   	Neuro: Awake, alert        	Skin: Warm and dry    LABS/STUDIES  --------------------------------------------------------------------------------              8.3    8.89  >-----------<  180      [10-04-22 @ 05:20]              25.9     133  |  99  |  81  ----------------------------<  97      [10-04-22 @ 05:20]  4.5   |  21  |  1.96        Ca     9.1     [10-04-22 @ 05:20]      Mg     1.70     [10-04-22 @ 05:20]      Phos  4.3     [10-04-22 @ 05:20]    Creatinine Trend:  SCr 1.96 [10-04 @ 05:20]  SCr 1.94 [10-03 @ 07:05]  SCr 1.75 [10-02 @ 06:22]  SCr 1.65 [10-01 @ 06:35]  SCr 1.54 [09-30 @ 06:20]    Urinalysis - [09-28-22 @ 07:43]      Color Light Yellow / Appearance Clear / SG 1.014 / pH 6.0      Gluc >= 1000 mg/dL / Ketone Negative  / Bili Negative / Urobili <2 mg/dL       Blood Trace / Protein 300 mg/dL / Leuk Est Negative / Nitrite Negative      RBC 4 / WBC 1 / Hyaline 1 / Gran  / Sq Epi  / Non Sq Epi 1 / Bacteria Negative    Urine Creatinine 22      [10-02-22 @ 23:29]  Urine Protein 30      [10-02-22 @ 23:29]  Urine Sodium 101      [10-02-22 @ 23:29]  Urine Urea Nitrogen 260.1      [10-02-22 @ 23:29]  Urine Potassium 20.9      [10-02-22 @ 23:29]  Urine Chloride 102      [10-02-22 @ 23:29]  Urine Osmolality 337      [10-02-22 @ 23:29]

## 2022-10-04 NOTE — PROGRESS NOTE ADULT - ASSESSMENT
56yo F with PMH of HTN, HLD, DM2, anemia presenting w resistant hypertension resulting in SOB c/f flash pulm edema. Now BP well controlled. Patient is still volume overloaded, requiring diuresis. Patient also anemic w/ transfusion 9/29. Nephrology consulted for ISAMAR. Adjusting BP meds as tolerated.

## 2022-10-04 NOTE — PROGRESS NOTE ADULT - PROBLEM SELECTOR PLAN 4
Per pt takes 20 of levemir depending on FSGs at home. Missed night of admission  - Increasing FSGs over past 24 hours   - 16u lantus  - increased premeal from to 6 units  - c/w low dose sliding scale  - ctm blood sugars for adjustment

## 2022-10-04 NOTE — PROGRESS NOTE ADULT - SUBJECTIVE AND OBJECTIVE BOX
Walter Kincaid, PGY2    DATE OF SERVICE: 10-04-22 @ 06:33    Patient is a 57y old  Female who presents with a chief complaint of shortness of breath (03 Oct 2022 15:07)      SUBJECTIVE / OVERNIGHT EVENTS: No acute events overnight. Patient seen and examined at bedside this AM.     MEDICATIONS  (STANDING):  amLODIPine   Tablet 10 milliGRAM(s) Oral daily  atorvastatin 40 milliGRAM(s) Oral at bedtime  cloNIDine 0.1 milliGRAM(s) Oral two times a day  dextrose 5%. 1000 milliLiter(s) (100 mL/Hr) IV Continuous <Continuous>  dextrose 5%. 1000 milliLiter(s) (50 mL/Hr) IV Continuous <Continuous>  dextrose 50% Injectable 25 Gram(s) IV Push once  dextrose 50% Injectable 12.5 Gram(s) IV Push once  dextrose 50% Injectable 25 Gram(s) IV Push once  glucagon  Injectable 1 milliGRAM(s) IntraMuscular once  heparin   Injectable 5000 Unit(s) SubCutaneous every 12 hours  hydrALAZINE 100 milliGRAM(s) Oral three times a day  influenza   Vaccine 0.5 milliLiter(s) IntraMuscular once  insulin glargine Injectable (LANTUS) 16 Unit(s) SubCutaneous every morning  insulin lispro (ADMELOG) corrective regimen sliding scale   SubCutaneous three times a day before meals  insulin lispro (ADMELOG) corrective regimen sliding scale   SubCutaneous at bedtime  insulin lispro Injectable (ADMELOG) 6 Unit(s) SubCutaneous three times a day before meals  labetalol 50 milliGRAM(s) Oral two times a day  levothyroxine 50 MICROGram(s) Oral daily  pantoprazole    Tablet 40 milliGRAM(s) Oral before breakfast    MEDICATIONS  (PRN):  acetaminophen     Tablet .. 650 milliGRAM(s) Oral every 6 hours PRN Temp greater or equal to 38C (100.4F), Mild Pain (1 - 3), Moderate Pain (4 - 6)  dextrose Oral Gel 15 Gram(s) Oral once PRN Blood Glucose LESS THAN 70 milliGRAM(s)/deciliter      Vital Signs Last 24 Hrs  T(C): 36.6 (04 Oct 2022 05:03), Max: 36.6 (03 Oct 2022 18:21)  T(F): 97.8 (04 Oct 2022 05:03), Max: 97.9 (03 Oct 2022 18:21)  HR: 60 (04 Oct 2022 05:03) (57 - 66)  BP: 133/55 (04 Oct 2022 05:03) (118/61 - 133/55)  BP(mean): --  RR: 18 (04 Oct 2022 05:03) (17 - 18)  SpO2: 100% (04 Oct 2022 05:03) (98% - 100%)    Parameters below as of 04 Oct 2022 05:03  Patient On (Oxygen Delivery Method): room air      CAPILLARY BLOOD GLUCOSE      POCT Blood Glucose.: 133 mg/dL (03 Oct 2022 22:18)  POCT Blood Glucose.: 216 mg/dL (03 Oct 2022 17:21)  POCT Blood Glucose.: 271 mg/dL (03 Oct 2022 11:57)  POCT Blood Glucose.: 247 mg/dL (03 Oct 2022 08:35)    I&O's Summary    02 Oct 2022 07:01  -  03 Oct 2022 07:00  --------------------------------------------------------  IN: 440 mL / OUT: 600 mL / NET: -160 mL        PHYSICAL EXAM:  GENERAL: NAD, well-developed  HEAD:  Atraumatic, Normocephalic  EYES: EOMI, PERRLA, conjunctiva and sclera clear  NECK: Supple, No JVD  CHEST/LUNG: Clear to auscultation bilaterally; No wheeze  HEART: Regular rate and rhythm; No murmurs, rubs, or gallops  ABDOMEN: Soft, Nontender, Nondistended; Bowel sounds present  EXTREMITIES:  2+ Peripheral Pulses, No clubbing, cyanosis, or edema  PSYCH: AAOx3  NEUROLOGY: non-focal  SKIN: No rashes or lesions    LABS:                        7.6    8.34  )-----------( 175      ( 03 Oct 2022 07:05 )             23.8     10-03    129<L>  |  97<L>  |  78<H>  ----------------------------<  270<H>  4.7   |  19<L>  |  1.94<H>    Ca    8.9      03 Oct 2022 07:05  Phos  4.2     10-03  Mg     1.80     10-03    TPro  6.4  /  Alb  3.6  /  TBili  0.3  /  DBili  x   /  AST  23  /  ALT  85<H>  /  AlkPhos  318<H>  10-03              RADIOLOGY & ADDITIONAL TESTS:    Imaging Personally Reviewed:    Consultant(s) Notes Reviewed:      Care Discussed with Consultants/Other Providers:   Walter Kincaid, PGY2    DATE OF SERVICE: 10-04-22 @ 06:33    Patient is a 57y old  Female who presents with a chief complaint of shortness of breath (03 Oct 2022 15:07)      SUBJECTIVE / OVERNIGHT EVENTS: No acute events overnight. Patient seen and examined at bedside this AM. Denies chest pain, dyspnea, abd pain, n/v. Has ongoing right upper extremity swelling around site of IV infiltration.     MEDICATIONS  (STANDING):  amLODIPine   Tablet 10 milliGRAM(s) Oral daily  atorvastatin 40 milliGRAM(s) Oral at bedtime  cloNIDine 0.1 milliGRAM(s) Oral two times a day  dextrose 5%. 1000 milliLiter(s) (100 mL/Hr) IV Continuous <Continuous>  dextrose 5%. 1000 milliLiter(s) (50 mL/Hr) IV Continuous <Continuous>  dextrose 50% Injectable 25 Gram(s) IV Push once  dextrose 50% Injectable 12.5 Gram(s) IV Push once  dextrose 50% Injectable 25 Gram(s) IV Push once  glucagon  Injectable 1 milliGRAM(s) IntraMuscular once  heparin   Injectable 5000 Unit(s) SubCutaneous every 12 hours  hydrALAZINE 100 milliGRAM(s) Oral three times a day  influenza   Vaccine 0.5 milliLiter(s) IntraMuscular once  insulin glargine Injectable (LANTUS) 16 Unit(s) SubCutaneous every morning  insulin lispro (ADMELOG) corrective regimen sliding scale   SubCutaneous three times a day before meals  insulin lispro (ADMELOG) corrective regimen sliding scale   SubCutaneous at bedtime  insulin lispro Injectable (ADMELOG) 6 Unit(s) SubCutaneous three times a day before meals  labetalol 50 milliGRAM(s) Oral two times a day  levothyroxine 50 MICROGram(s) Oral daily  pantoprazole    Tablet 40 milliGRAM(s) Oral before breakfast    MEDICATIONS  (PRN):  acetaminophen     Tablet .. 650 milliGRAM(s) Oral every 6 hours PRN Temp greater or equal to 38C (100.4F), Mild Pain (1 - 3), Moderate Pain (4 - 6)  dextrose Oral Gel 15 Gram(s) Oral once PRN Blood Glucose LESS THAN 70 milliGRAM(s)/deciliter      Vital Signs Last 24 Hrs  T(C): 36.6 (04 Oct 2022 05:03), Max: 36.6 (03 Oct 2022 18:21)  T(F): 97.8 (04 Oct 2022 05:03), Max: 97.9 (03 Oct 2022 18:21)  HR: 60 (04 Oct 2022 05:03) (57 - 66)  BP: 133/55 (04 Oct 2022 05:03) (118/61 - 133/55)  BP(mean): --  RR: 18 (04 Oct 2022 05:03) (17 - 18)  SpO2: 100% (04 Oct 2022 05:03) (98% - 100%)    Parameters below as of 04 Oct 2022 05:03  Patient On (Oxygen Delivery Method): room air      CAPILLARY BLOOD GLUCOSE      POCT Blood Glucose.: 133 mg/dL (03 Oct 2022 22:18)  POCT Blood Glucose.: 216 mg/dL (03 Oct 2022 17:21)  POCT Blood Glucose.: 271 mg/dL (03 Oct 2022 11:57)  POCT Blood Glucose.: 247 mg/dL (03 Oct 2022 08:35)    I&O's Summary    02 Oct 2022 07:01  -  03 Oct 2022 07:00  --------------------------------------------------------  IN: 440 mL / OUT: 600 mL / NET: -160 mL        PHYSICAL EXAM:  CONSTITUTIONAL: NAD, well-developed  RESPIRATORY: Normal respiratory effort; lungs are clear to auscultation bilaterally  CARDIOVASCULAR: Regular rate and rhythm, normal S1 and S2, no murmur/rub/gallop; 1+lower extremity edema; Peripheral pulses are 2+ bilaterally  ABDOMEN: Nontender to palpation, normoactive bowel sounds, no rebound/guarding; No hepatosplenomegaly  MUSCULOSKELETAL: BL LE swelling 1+ edema  PSYCH: A+O to person, place, and time; affect appropriate    LABS:                        7.6    8.34  )-----------( 175      ( 03 Oct 2022 07:05 )             23.8     10-03    129<L>  |  97<L>  |  78<H>  ----------------------------<  270<H>  4.7   |  19<L>  |  1.94<H>    Ca    8.9      03 Oct 2022 07:05  Phos  4.2     10-03  Mg     1.80     10-03    TPro  6.4  /  Alb  3.6  /  TBili  0.3  /  DBili  x   /  AST  23  /  ALT  85<H>  /  AlkPhos  318<H>  10-03              RADIOLOGY & ADDITIONAL TESTS:    Imaging Personally Reviewed:    Consultant(s) Notes Reviewed:      Care Discussed with Consultants/Other Providers:

## 2022-10-04 NOTE — PROGRESS NOTE ADULT - PROBLEM SELECTOR PLAN 1
Patient with resistant secondary HTN of unclear etiology. Found to have severe pulmonary hypertension on Echo. D/dx for secondary HTN includes renal artery stenosis vs primary pulmonary HTN vs OLIVE, less likely pheochromocytoma given clinical symptoms.    - goal 120s-150s systolic    - c/w amlodipine 10mg qd, labetalol 50mg BID, clonidine 0.1mg BID, hydral 100mg TID  - plan to wean off clonidine as per Cardio recs-- wean clonidine 0.1 BID  - VA Duplex Abdomen/Pelvis w/ no significant renal stenosis  - TSH elevated to 8.63, Free T4 WNL  - f/u renin/reji (recent renin/reji at [facility] WNL)  - f/w urine/plasma metanephrines Patient with resistant secondary HTN of unclear etiology. Found to have severe pulmonary hypertension on Echo. D/dx for secondary HTN includes renal artery stenosis vs primary pulmonary HTN vs OLIVE, less likely pheochromocytoma given clinical symptoms.    - goal 120s-150s systolic    - c/w amlodipine 10mg qd, labetalol 50mg BID, clonidine 0.1mg BID, hydral 100mg TID  - plan to wean off clonidine as per Cardio recs-- wean clonidine 0.1 BID  - VA Duplex Abdomen/Pelvis w/ no significant renal stenosis  - TSH elevated to 8.63, Free T4 WNL  - renin activity WNL (recent renin/reji at [facility] WNL)  - f/w urine/plasma metanephrines

## 2022-10-04 NOTE — PROVIDER CONTACT NOTE (OTHER) - RECOMMENDATIONS
DICK Tomlinson made aware. will continue to monitor Provider Carlos [id number] aware. will continue to monitor

## 2022-10-04 NOTE — PROGRESS NOTE ADULT - PROBLEM SELECTOR PLAN 3
Worsening Scr to 1.7. likely sequelae 2/2 to hypertensive emergency. Cannot r/o underlying CKD  - unknown baseline, elevated at last hospitalization  - per [facility] records Creatinine 1.83  9/16/22, but baseline 1.2-1.3  - VA Duplex Abdomen/Pelvis w/o concern for renal artery stenosis  - Urine Na and Urine Cr WNL  - U/A unremarkable   - CTM Bun/Cr.  - FeUREA 29%-> indicative of pre-renal ISAMAR likely sequelae 2/2 to hypertensive emergency. Cannot r/o underlying CKD  - unknown baseline, elevated at last hospitalization  - per [facility] records Creatinine 1.83  9/16/22, but baseline 1.2-1.3  - VA Duplex Abdomen/Pelvis w/o concern for renal artery stenosis  - Urine Na and Urine Cr WNL  - U/A unremarkable   - CTM Bun/Cr.  - FeUREA 29%-> indicative of pre-renal ISAMAR  - nephrology consulted, appreciate recs

## 2022-10-05 DIAGNOSIS — R79.89 OTHER SPECIFIED ABNORMAL FINDINGS OF BLOOD CHEMISTRY: ICD-10-CM

## 2022-10-05 LAB
ALBUMIN SERPL ELPH-MCNC: 3.9 G/DL — SIGNIFICANT CHANGE UP (ref 3.3–5)
ALP SERPL-CCNC: 280 U/L — HIGH (ref 40–120)
ALT FLD-CCNC: 61 U/L — HIGH (ref 4–33)
ANION GAP SERPL CALC-SCNC: 16 MMOL/L — HIGH (ref 7–14)
AST SERPL-CCNC: 26 U/L — SIGNIFICANT CHANGE UP (ref 4–32)
BASOPHILS # BLD AUTO: 0.02 K/UL — SIGNIFICANT CHANGE UP (ref 0–0.2)
BASOPHILS NFR BLD AUTO: 0.2 % — SIGNIFICANT CHANGE UP (ref 0–2)
BILIRUB SERPL-MCNC: 0.4 MG/DL — SIGNIFICANT CHANGE UP (ref 0.2–1.2)
BUN SERPL-MCNC: 83 MG/DL — HIGH (ref 7–23)
CALCIUM SERPL-MCNC: 9.2 MG/DL — SIGNIFICANT CHANGE UP (ref 8.4–10.5)
CHLORIDE SERPL-SCNC: 98 MMOL/L — SIGNIFICANT CHANGE UP (ref 98–107)
CO2 SERPL-SCNC: 22 MMOL/L — SIGNIFICANT CHANGE UP (ref 22–31)
CREAT SERPL-MCNC: 1.92 MG/DL — HIGH (ref 0.5–1.3)
CREATININE, RNDM UR: 92.6 MG/DL — SIGNIFICANT CHANGE UP
EGFR: 30 ML/MIN/1.73M2 — LOW
EOSINOPHIL # BLD AUTO: 0.73 K/UL — HIGH (ref 0–0.5)
EOSINOPHIL NFR BLD AUTO: 8.9 % — HIGH (ref 0–6)
GLUCOSE BLDC GLUCOMTR-MCNC: 124 MG/DL — HIGH (ref 70–99)
GLUCOSE BLDC GLUCOMTR-MCNC: 133 MG/DL — HIGH (ref 70–99)
GLUCOSE BLDC GLUCOMTR-MCNC: 208 MG/DL — HIGH (ref 70–99)
GLUCOSE BLDC GLUCOMTR-MCNC: 333 MG/DL — HIGH (ref 70–99)
GLUCOSE SERPL-MCNC: 108 MG/DL — HIGH (ref 70–99)
HCT VFR BLD CALC: 24.2 % — LOW (ref 34.5–45)
HGB BLD-MCNC: 7.8 G/DL — LOW (ref 11.5–15.5)
IANC: 5.75 K/UL — SIGNIFICANT CHANGE UP (ref 1.8–7.4)
IMM GRANULOCYTES NFR BLD AUTO: 0.6 % — SIGNIFICANT CHANGE UP (ref 0–0.9)
LYMPHOCYTES # BLD AUTO: 0.99 K/UL — LOW (ref 1–3.3)
LYMPHOCYTES # BLD AUTO: 12 % — LOW (ref 13–44)
MAGNESIUM SERPL-MCNC: 1.8 MG/DL — SIGNIFICANT CHANGE UP (ref 1.6–2.6)
MCHC RBC-ENTMCNC: 24.8 PG — LOW (ref 27–34)
MCHC RBC-ENTMCNC: 32.2 GM/DL — SIGNIFICANT CHANGE UP (ref 32–36)
MCV RBC AUTO: 77.1 FL — LOW (ref 80–100)
METANEPHS 24H UR-MCNC: 0.8 — SIGNIFICANT CHANGE UP (ref 0–1)
METANEPHS 24H UR-MCNC: 131 UG/L — SIGNIFICANT CHANGE UP
MONOCYTES # BLD AUTO: 0.68 K/UL — SIGNIFICANT CHANGE UP (ref 0–0.9)
MONOCYTES NFR BLD AUTO: 8.3 % — SIGNIFICANT CHANGE UP (ref 2–14)
NEUTROPHILS # BLD AUTO: 5.75 K/UL — SIGNIFICANT CHANGE UP (ref 1.8–7.4)
NEUTROPHILS NFR BLD AUTO: 70 % — SIGNIFICANT CHANGE UP (ref 43–77)
NORMETANEPHRINE.: 574 UG/L — SIGNIFICANT CHANGE UP
NRBC # BLD: 0 /100 WBCS — SIGNIFICANT CHANGE UP (ref 0–0)
NRBC # FLD: 0 K/UL — SIGNIFICANT CHANGE UP (ref 0–0)
PHOSPHATE SERPL-MCNC: 4.7 MG/DL — HIGH (ref 2.5–4.5)
PLATELET # BLD AUTO: 179 K/UL — SIGNIFICANT CHANGE UP (ref 150–400)
POTASSIUM SERPL-MCNC: 4.3 MMOL/L — SIGNIFICANT CHANGE UP (ref 3.5–5.3)
POTASSIUM SERPL-SCNC: 4.3 MMOL/L — SIGNIFICANT CHANGE UP (ref 3.5–5.3)
PROT SERPL-MCNC: 6.4 G/DL — SIGNIFICANT CHANGE UP (ref 6–8.3)
RBC # BLD: 3.14 M/UL — LOW (ref 3.8–5.2)
RBC # FLD: 17.4 % — HIGH (ref 10.3–14.5)
SODIUM SERPL-SCNC: 136 MMOL/L — SIGNIFICANT CHANGE UP (ref 135–145)
WBC # BLD: 8.22 K/UL — SIGNIFICANT CHANGE UP (ref 3.8–10.5)
WBC # FLD AUTO: 8.22 K/UL — SIGNIFICANT CHANGE UP (ref 3.8–10.5)

## 2022-10-05 PROCEDURE — 99232 SBSQ HOSP IP/OBS MODERATE 35: CPT | Mod: GC

## 2022-10-05 RX ORDER — MAGNESIUM SULFATE 500 MG/ML
2 VIAL (ML) INJECTION ONCE
Refills: 0 | Status: COMPLETED | OUTPATIENT
Start: 2022-10-05 | End: 2022-10-05

## 2022-10-05 RX ADMIN — HEPARIN SODIUM 5000 UNIT(S): 5000 INJECTION INTRAVENOUS; SUBCUTANEOUS at 05:26

## 2022-10-05 RX ADMIN — HEPARIN SODIUM 5000 UNIT(S): 5000 INJECTION INTRAVENOUS; SUBCUTANEOUS at 18:15

## 2022-10-05 RX ADMIN — Medication 100 MILLIGRAM(S): at 05:27

## 2022-10-05 RX ADMIN — Medication 2: at 18:39

## 2022-10-05 RX ADMIN — Medication 80 MILLIGRAM(S): at 18:16

## 2022-10-05 RX ADMIN — ATORVASTATIN CALCIUM 40 MILLIGRAM(S): 80 TABLET, FILM COATED ORAL at 21:23

## 2022-10-05 RX ADMIN — Medication 50 MILLIGRAM(S): at 18:15

## 2022-10-05 RX ADMIN — INSULIN GLARGINE 16 UNIT(S): 100 INJECTION, SOLUTION SUBCUTANEOUS at 08:59

## 2022-10-05 RX ADMIN — Medication 25 GRAM(S): at 10:24

## 2022-10-05 RX ADMIN — Medication 0.1 MILLIGRAM(S): at 05:27

## 2022-10-05 RX ADMIN — Medication 80 MILLIGRAM(S): at 05:27

## 2022-10-05 RX ADMIN — AMLODIPINE BESYLATE 10 MILLIGRAM(S): 2.5 TABLET ORAL at 05:28

## 2022-10-05 RX ADMIN — Medication 50 MILLIGRAM(S): at 05:28

## 2022-10-05 RX ADMIN — Medication 4: at 13:13

## 2022-10-05 RX ADMIN — Medication 6 UNIT(S): at 13:12

## 2022-10-05 RX ADMIN — Medication 6 UNIT(S): at 09:00

## 2022-10-05 RX ADMIN — Medication 50 MICROGRAM(S): at 05:27

## 2022-10-05 RX ADMIN — Medication 6 UNIT(S): at 18:40

## 2022-10-05 RX ADMIN — PANTOPRAZOLE SODIUM 40 MILLIGRAM(S): 20 TABLET, DELAYED RELEASE ORAL at 05:28

## 2022-10-05 RX ADMIN — Medication 100 MILLIGRAM(S): at 15:19

## 2022-10-05 NOTE — PROGRESS NOTE ADULT - PROBLEM SELECTOR PLAN 3
likely sequelae 2/2 to hypertensive emergency. Cannot r/o underlying CKD  - unknown baseline, elevated at last hospitalization  - per [facility] records Creatinine 1.83  9/16/22, but baseline 1.2-1.3  - VA Duplex Abdomen/Pelvis w/o concern for renal artery stenosis  - Urine Na and Urine Cr WNL  - U/A unremarkable   - CTM Bun/Cr.  - FeUREA 29%-> indicative of pre-renal ISAMAR  - nephrology consulted, appreciate recs likely sequelae 2/2 to hypertensive emergency. Cannot r/o underlying CKD  - unknown baseline, elevated at last hospitalization  - FeUREA 29%-> indicative of pre-renal ISAMAR, but given stable creatinine with continued diuresis, may be in setting of CKD  - per [facility] records Creatinine 1.83  9/16/22, but baseline 1.2-1.3  - VA Duplex Abdomen/Pelvis w/o concern for renal artery stenosis  - Urine Na and Urine Cr WNL  - U/A unremarkable   - CTM Bun/Cr.  - nephrology consulted, appreciate recs  - avoid nephrotoxic drugs, dose medications renally

## 2022-10-05 NOTE — PROGRESS NOTE ADULT - ASSESSMENT
58yo F with PMH of HTN, HLD, DM2, anemia presenting w resistant hypertension resulting in SOB c/f flash pulm edema. Now BP well controlled. Patient is still volume overloaded, requiring diuresis. Patient also anemic w/ transfusion 9/29. Nephrology consulted for ISAMAR. Adjusting BP meds as tolerated.

## 2022-10-05 NOTE — PROGRESS NOTE ADULT - PROBLEM SELECTOR PLAN 1
(3) Not Aware of Limitations Patient with resistant HTN of unclear etiology. Found to have severe pulmonary hypertension on Echo. Likely uncontrolled primary HTN in setting of medication non-compliance.     - goal 120s-150s systolic    - c/w amlodipine 10mg qd, labetalol 50mg BID, clonidine 0.1mg BID, hydral 100mg TID  - plan to wean off clonidine as per Cardio recs-- wean clonidine 0.1 BID  - VA Duplex Abdomen/Pelvis w/ no significant renal stenosis  - TSH elevated to 8.63, Free T4 WNL  - renin activity WNL (recent renin/reji at [facility] WNL)  - urine/plasma metanephrines WNL Patient with resistant HTN of unclear etiology. Found to have severe pulmonary hypertension on Echo. Likely uncontrolled primary HTN in setting of medication non-compliance.     - goal 120s-150s systolic    - c/w amlodipine 10mg qd, labetalol 50mg BID, clonidine 0.1mg daily, hydral 100mg TID  - plan to wean off clonidine as per Cardio recs-- wean clonidine 0.1mg  daily  - VA Duplex Abdomen/Pelvis w/ no significant renal stenosis  - TSH elevated to 8.63, Free T4 WNL  - renin activity WNL (recent renin/reji at [facility] WNL)  - urine/plasma metanephrines WNL

## 2022-10-05 NOTE — PROGRESS NOTE ADULT - SUBJECTIVE AND OBJECTIVE BOX
Walter Kincaid, PGY2    DATE OF SERVICE: 10-05-22 @ 07:31    Patient is a 57y old  Female who presents with a chief complaint of shortness of breath (04 Oct 2022 08:33)      SUBJECTIVE / OVERNIGHT EVENTS: No acute events overnight. Patient seen and examined this AM.     MEDICATIONS  (STANDING):  amLODIPine   Tablet 10 milliGRAM(s) Oral daily  atorvastatin 40 milliGRAM(s) Oral at bedtime  cloNIDine 0.1 milliGRAM(s) Oral two times a day  dextrose 5%. 1000 milliLiter(s) (100 mL/Hr) IV Continuous <Continuous>  dextrose 5%. 1000 milliLiter(s) (50 mL/Hr) IV Continuous <Continuous>  dextrose 50% Injectable 25 Gram(s) IV Push once  dextrose 50% Injectable 12.5 Gram(s) IV Push once  dextrose 50% Injectable 25 Gram(s) IV Push once  furosemide   Injectable 80 milliGRAM(s) IV Push every 12 hours  glucagon  Injectable 1 milliGRAM(s) IntraMuscular once  heparin   Injectable 5000 Unit(s) SubCutaneous every 12 hours  hydrALAZINE 100 milliGRAM(s) Oral three times a day  influenza   Vaccine 0.5 milliLiter(s) IntraMuscular once  insulin glargine Injectable (LANTUS) 16 Unit(s) SubCutaneous every morning  insulin lispro (ADMELOG) corrective regimen sliding scale   SubCutaneous three times a day before meals  insulin lispro (ADMELOG) corrective regimen sliding scale   SubCutaneous at bedtime  insulin lispro Injectable (ADMELOG) 6 Unit(s) SubCutaneous three times a day before meals  labetalol 50 milliGRAM(s) Oral two times a day  levothyroxine 50 MICROGram(s) Oral daily  pantoprazole    Tablet 40 milliGRAM(s) Oral before breakfast    MEDICATIONS  (PRN):  acetaminophen     Tablet .. 650 milliGRAM(s) Oral every 6 hours PRN Temp greater or equal to 38C (100.4F), Mild Pain (1 - 3), Moderate Pain (4 - 6)  dextrose Oral Gel 15 Gram(s) Oral once PRN Blood Glucose LESS THAN 70 milliGRAM(s)/deciliter      Vital Signs Last 24 Hrs  T(C): 36.7 (05 Oct 2022 05:25), Max: 36.7 (04 Oct 2022 10:05)  T(F): 98 (05 Oct 2022 05:25), Max: 98 (04 Oct 2022 10:05)  HR: 67 (05 Oct 2022 05:25) (60 - 67)  BP: 132/64 (05 Oct 2022 05:25) (120/52 - 147/63)  BP(mean): 84 (04 Oct 2022 13:50) (73 - 84)  RR: 19 (05 Oct 2022 05:25) (17 - 21)  SpO2: 98% (05 Oct 2022 05:25) (98% - 100%)    Parameters below as of 05 Oct 2022 05:25  Patient On (Oxygen Delivery Method): room air      CAPILLARY BLOOD GLUCOSE      POCT Blood Glucose.: 169 mg/dL (04 Oct 2022 23:09)  POCT Blood Glucose.: 187 mg/dL (04 Oct 2022 21:21)  POCT Blood Glucose.: 97 mg/dL (04 Oct 2022 16:53)  POCT Blood Glucose.: 183 mg/dL (04 Oct 2022 11:55)  POCT Blood Glucose.: 113 mg/dL (04 Oct 2022 08:27)    I&O's Summary    04 Oct 2022 07:01  -  05 Oct 2022 07:00  --------------------------------------------------------  IN: 860 mL / OUT: 1550 mL / NET: -690 mL        PHYSICAL EXAM:  CONSTITUTIONAL: NAD, well-developed  RESPIRATORY: Normal respiratory effort; lungs are clear to auscultation bilaterally  CARDIOVASCULAR: Regular rate and rhythm, normal S1 and S2, no murmur/rub/gallop; 1+lower extremity edema; Peripheral pulses are 2+ bilaterally  ABDOMEN: Nontender to palpation, normoactive bowel sounds, no rebound/guarding; No hepatosplenomegaly  MUSCULOSKELETAL: BL LE swelling 1+ edema  PSYCH: A+O to person, place, and time; affect appropriate    LABS:                        8.3    8.89  )-----------( 180      ( 04 Oct 2022 05:20 )             25.9     10-04    133<L>  |  99  |  81<H>  ----------------------------<  97  4.5   |  21<L>  |  1.96<H>    Ca    9.1      04 Oct 2022 05:20  Phos  4.3     10-04  Mg     1.70     10-04    TPro  6.7  /  Alb  3.8  /  TBili  0.3  /  DBili  x   /  AST  25  /  ALT  74<H>  /  AlkPhos  323<H>  10-04              RADIOLOGY & ADDITIONAL TESTS:    Imaging Personally Reviewed:    Consultant(s) Notes Reviewed:      Care Discussed with Consultants/Other Providers:   Walter Kincaid, PGY2    DATE OF SERVICE: 10-05-22 @ 07:31    Patient is a 57y old  Female who presents with a chief complaint of shortness of breath (04 Oct 2022 08:33)      SUBJECTIVE / OVERNIGHT EVENTS: No acute events overnight. Patient seen and examined this AM. Patient wants to go home, explained to her that she is still volume overloaded. Needs further IV diuresis. Patient doing well otherwise. Denies chest pain, dyspnea, abd pain, n/v. Right upper extremity swelling at site of IV extravasation is still present but less painful.     MEDICATIONS  (STANDING):  amLODIPine   Tablet 10 milliGRAM(s) Oral daily  atorvastatin 40 milliGRAM(s) Oral at bedtime  cloNIDine 0.1 milliGRAM(s) Oral two times a day  dextrose 5%. 1000 milliLiter(s) (100 mL/Hr) IV Continuous <Continuous>  dextrose 5%. 1000 milliLiter(s) (50 mL/Hr) IV Continuous <Continuous>  dextrose 50% Injectable 25 Gram(s) IV Push once  dextrose 50% Injectable 12.5 Gram(s) IV Push once  dextrose 50% Injectable 25 Gram(s) IV Push once  furosemide   Injectable 80 milliGRAM(s) IV Push every 12 hours  glucagon  Injectable 1 milliGRAM(s) IntraMuscular once  heparin   Injectable 5000 Unit(s) SubCutaneous every 12 hours  hydrALAZINE 100 milliGRAM(s) Oral three times a day  influenza   Vaccine 0.5 milliLiter(s) IntraMuscular once  insulin glargine Injectable (LANTUS) 16 Unit(s) SubCutaneous every morning  insulin lispro (ADMELOG) corrective regimen sliding scale   SubCutaneous three times a day before meals  insulin lispro (ADMELOG) corrective regimen sliding scale   SubCutaneous at bedtime  insulin lispro Injectable (ADMELOG) 6 Unit(s) SubCutaneous three times a day before meals  labetalol 50 milliGRAM(s) Oral two times a day  levothyroxine 50 MICROGram(s) Oral daily  pantoprazole    Tablet 40 milliGRAM(s) Oral before breakfast    MEDICATIONS  (PRN):  acetaminophen     Tablet .. 650 milliGRAM(s) Oral every 6 hours PRN Temp greater or equal to 38C (100.4F), Mild Pain (1 - 3), Moderate Pain (4 - 6)  dextrose Oral Gel 15 Gram(s) Oral once PRN Blood Glucose LESS THAN 70 milliGRAM(s)/deciliter      Vital Signs Last 24 Hrs  T(C): 36.7 (05 Oct 2022 05:25), Max: 36.7 (04 Oct 2022 10:05)  T(F): 98 (05 Oct 2022 05:25), Max: 98 (04 Oct 2022 10:05)  HR: 67 (05 Oct 2022 05:25) (60 - 67)  BP: 132/64 (05 Oct 2022 05:25) (120/52 - 147/63)  BP(mean): 84 (04 Oct 2022 13:50) (73 - 84)  RR: 19 (05 Oct 2022 05:25) (17 - 21)  SpO2: 98% (05 Oct 2022 05:25) (98% - 100%)    Parameters below as of 05 Oct 2022 05:25  Patient On (Oxygen Delivery Method): room air      CAPILLARY BLOOD GLUCOSE      POCT Blood Glucose.: 169 mg/dL (04 Oct 2022 23:09)  POCT Blood Glucose.: 187 mg/dL (04 Oct 2022 21:21)  POCT Blood Glucose.: 97 mg/dL (04 Oct 2022 16:53)  POCT Blood Glucose.: 183 mg/dL (04 Oct 2022 11:55)  POCT Blood Glucose.: 113 mg/dL (04 Oct 2022 08:27)    I&O's Summary    04 Oct 2022 07:01  -  05 Oct 2022 07:00  --------------------------------------------------------  IN: 860 mL / OUT: 1550 mL / NET: -690 mL        PHYSICAL EXAM:  CONSTITUTIONAL: NAD, well-developed  RESPIRATORY: Normal respiratory effort; lungs are clear to auscultation bilaterally  CARDIOVASCULAR: Regular rate and rhythm, normal S1 and S2, no murmur/rub/gallop; 1+lower extremity edema; Peripheral pulses are 2+ bilaterally  ABDOMEN: Nontender to palpation, normoactive bowel sounds, no rebound/guarding; No hepatosplenomegaly  MUSCULOSKELETAL: BL LE swelling 1+ edema, Right upper extremity swelling, full range of motion and sensation in right arm  PSYCH: A+O to person, place, and time; affect appropriate    LABS:                        8.3    8.89  )-----------( 180      ( 04 Oct 2022 05:20 )             25.9     10-04    133<L>  |  99  |  81<H>  ----------------------------<  97  4.5   |  21<L>  |  1.96<H>    Ca    9.1      04 Oct 2022 05:20  Phos  4.3     10-04  Mg     1.70     10-04    TPro  6.7  /  Alb  3.8  /  TBili  0.3  /  DBili  x   /  AST  25  /  ALT  74<H>  /  AlkPhos  323<H>  10-04              RADIOLOGY & ADDITIONAL TESTS:    Imaging Personally Reviewed:    Consultant(s) Notes Reviewed:      Care Discussed with Consultants/Other Providers:

## 2022-10-05 NOTE — CHART NOTE - NSCHARTNOTEFT_GEN_A_CORE
Source: Patient [x ]    Family [ ]     other [x ] Chart Review    Current Diet : Diet, DASH/TLC:   Sodium & Cholesterol Restricted  Consistent Carbohydrate {Evening Snack} (CSTCHOSN)  1200mL Fluid Restriction ([id number])  No Beef  No Pork (09-29-22 @ 16:52)    PO intake: % [x ]    Height: 5'2"/157.48cm  Weight: 65.7kg/144.8lbs (10/5/2022)  Weight History: 71.1kg/156.7lbs (10/2/2022)  Weight change: -11.9lbs/-7.6% BW x 3 days.  Fluid shift might contribute to weight changes.    Edema: As per flow sheet, patient has 1+edema to left foot, right foot, left ankle, right ankle, left leg, right leg, 2+ edema to right arm and elbow.  Skin: No pressure injury noted at this time    Nutrition Note:   58yo F with PMH of HTN, HLD, DM2, anemia presenting w resistant hypertension resulting in SOB c/f flash pulm edema, per chart.  Patient reports height of 5'2", usual body weight of around 130lbs. Patient has no known food allergies, does not follow a special diet at home, does not eat beef and pork. Patient reports good appetite, reports consuming >75% of meals. Denies any difficulty chewing or swallowing, denies any GI distress during visit, reports last bowel movement 10/4/2022. As per chart review, noted with elevated HgA1c- 9.1% (9/29/2022), elevated phos- 4.7 (10/5/2022). Recommend adding no concentrated phos restriction to diet.   Patient admits using fresh ingredients, olive oil in cooking at home. RD provided extensive nutrition education on heart healthy and diabetic diet, including avoid salt in cooking, nutrition labels, sodium in processed and canned foods, increase intake of vegetables and fiber, source of carbohydrates, portion control, avoid concentrated sweets and beverages. Patient is receptive to information provided.     __________________ Pertinent Medications__________________   MEDICATIONS  (STANDING):  amLODIPine   Tablet 10 milliGRAM(s) Oral daily  atorvastatin 40 milliGRAM(s) Oral at bedtime  dextrose 5%. 1000 milliLiter(s) (100 mL/Hr) IV Continuous <Continuous>  dextrose 5%. 1000 milliLiter(s) (50 mL/Hr) IV Continuous <Continuous>  dextrose 50% Injectable 25 Gram(s) IV Push once  dextrose 50% Injectable 12.5 Gram(s) IV Push once  dextrose 50% Injectable 25 Gram(s) IV Push once  furosemide   Injectable 80 milliGRAM(s) IV Push every 12 hours  glucagon  Injectable 1 milliGRAM(s) IntraMuscular once  heparin   Injectable 5000 Unit(s) SubCutaneous every 12 hours  hydrALAZINE 100 milliGRAM(s) Oral three times a day  influenza   Vaccine 0.5 milliLiter(s) IntraMuscular once  insulin glargine Injectable (LANTUS) 16 Unit(s) SubCutaneous every morning  insulin lispro (ADMELOG) corrective regimen sliding scale   SubCutaneous three times a day before meals  insulin lispro (ADMELOG) corrective regimen sliding scale   SubCutaneous at bedtime  insulin lispro Injectable (ADMELOG) 6 Unit(s) SubCutaneous three times a day before meals  labetalol 50 milliGRAM(s) Oral two times a day  levothyroxine 50 MICROGram(s) Oral daily  metolazone 2.5 milliGRAM(s) Oral once  pantoprazole    Tablet 40 milliGRAM(s) Oral before breakfast    MEDICATIONS  (PRN):  acetaminophen     Tablet .. 650 milliGRAM(s) Oral every 6 hours PRN Temp greater or equal to 38C (100.4F), Mild Pain (1 - 3), Moderate Pain (4 - 6)  dextrose Oral Gel 15 Gram(s) Oral once PRN Blood Glucose LESS THAN 70 milliGRAM(s)/deciliter      __________________ Pertinent Labs__________________   10-05 Na136 mmol/L Glu 108 mg/dL<H> K+ 4.3 mmol/L Cr  1.92 mg/dL<H> BUN 83 mg/dL<H> 10-05 Phos 4.7 mg/dL<H> 10-05 Alb 3.9 g/dL      Estimated Needs:   [ x] no change since previous assessment      Previous Nutrition Diagnosis:     [ x] Altered Nutrition Related Lab Values    Nutrition Diagnosis is [ x] ongoing       New Nutrition Diagnosis: [ x] not applicable      Recommend    [x] Change diet to DASH, consistent carb, no concentrated phos, no beef, no pork. Fluid needs per MD discretion.     Monitor  [x ] PO intake [ x] Tolerance to diet prescription [ x] weights [ x] follow up per protocol  [ x] other: Bowel movement, labs

## 2022-10-05 NOTE — PROGRESS NOTE ADULT - ATTENDING COMMENTS
57 y.o. F w/ a hx of HTN, DM2, hypothyroidism hospitalized for ADHF 2/2 HTN emergency.     Patient feels well, wants to go home. PE 2+ LE edema, improving. Labs w/ Cr 1.92    # HTN emergency c/b ADHF: BP improved. Lower clonidine to .1mg QD, cont home meds. Secondary HTN workup negative. Will continue diuresis. Repeat TTE when euvolemic.   # ISAMAR: Baseline 1.3 per OSH records, will obtain remainder of records. Still appears edematous. Cont diuresis today.   # DM2: Cont Lantus 16 units + Lispro 4 w/ meals+ SSI  # Transaminitis: Suspect hepatic congestion, improving with diuresis, continue.   # Hypothyroidism: TSH elevated 8.63- pt reports taking it in AM and takes a PPI soon after. Discussed importance of taking Synthroid on it's own on an empty stomach.   # Microcytic anemia: Cont iron repletion. Follow up OP GI for colonoscopy.

## 2022-10-05 NOTE — PROGRESS NOTE ADULT - PROBLEM SELECTOR PLAN 4
Per pt takes 20 of levemir depending on FSGs at home. Missed night of admission  - Increasing FSGs over past 24 hours   - 16u lantus  - increased premeal from to 6 units  - c/w low dose sliding scale  - ctm blood sugars for adjustment Per pt takes 20 of levemir depending on FSGs at home. Missed night of admission  - 16u lantus  - continue premeal 6 units  - c/w low dose sliding scale  - ctm blood sugars for adjustment

## 2022-10-05 NOTE — PROGRESS NOTE ADULT - PROBLEM SELECTOR PLAN 2
Acute decompensated heart failure.   On admission, probnp. Echo: EF: 64%, elevated right sided pressures consistent with severe pulmonary hypertension, but no LV dysfunction   - diurese with 80mg IV lasix today***  - Cards: repeat Echo when euvolemic to reassess pulmonary hypertension as patient is fluid overloaded   - Strict I's and O's  - Goal of Mg >2, K >4. Acute decompensated heart failure.   On admission, probnp. Echo: EF: 64%, elevated right sided pressures consistent with severe pulmonary hypertension, but no LV dysfunction   - diurese with 80mg IV lasix today  - Cards: repeat Echo when euvolemic to reassess pulmonary hypertension as patient is fluid overloaded   - Strict I's and O's  - Goal of Mg >2, K >4.

## 2022-10-06 ENCOUNTER — TRANSCRIPTION ENCOUNTER (OUTPATIENT)
Age: 57
End: 2022-10-06

## 2022-10-06 LAB
ALBUMIN SERPL ELPH-MCNC: 3.7 G/DL — SIGNIFICANT CHANGE UP (ref 3.3–5)
ALP SERPL-CCNC: 272 U/L — HIGH (ref 40–120)
ALT FLD-CCNC: 57 U/L — HIGH (ref 4–33)
ANION GAP SERPL CALC-SCNC: 14 MMOL/L — SIGNIFICANT CHANGE UP (ref 7–14)
AST SERPL-CCNC: 31 U/L — SIGNIFICANT CHANGE UP (ref 4–32)
BASOPHILS # BLD AUTO: 0.02 K/UL — SIGNIFICANT CHANGE UP (ref 0–0.2)
BASOPHILS NFR BLD AUTO: 0.2 % — SIGNIFICANT CHANGE UP (ref 0–2)
BILIRUB SERPL-MCNC: 0.4 MG/DL — SIGNIFICANT CHANGE UP (ref 0.2–1.2)
BLD GP AB SCN SERPL QL: NEGATIVE — SIGNIFICANT CHANGE UP
BUN SERPL-MCNC: 86 MG/DL — HIGH (ref 7–23)
CALCIUM SERPL-MCNC: 9.4 MG/DL — SIGNIFICANT CHANGE UP (ref 8.4–10.5)
CHLORIDE SERPL-SCNC: 98 MMOL/L — SIGNIFICANT CHANGE UP (ref 98–107)
CO2 SERPL-SCNC: 22 MMOL/L — SIGNIFICANT CHANGE UP (ref 22–31)
CREAT SERPL-MCNC: 1.99 MG/DL — HIGH (ref 0.5–1.3)
EGFR: 29 ML/MIN/1.73M2 — LOW
EOSINOPHIL # BLD AUTO: 0.88 K/UL — HIGH (ref 0–0.5)
EOSINOPHIL NFR BLD AUTO: 9.9 % — HIGH (ref 0–6)
GLUCOSE BLDC GLUCOMTR-MCNC: 113 MG/DL — HIGH (ref 70–99)
GLUCOSE BLDC GLUCOMTR-MCNC: 164 MG/DL — HIGH (ref 70–99)
GLUCOSE BLDC GLUCOMTR-MCNC: 168 MG/DL — HIGH (ref 70–99)
GLUCOSE BLDC GLUCOMTR-MCNC: 243 MG/DL — HIGH (ref 70–99)
GLUCOSE SERPL-MCNC: 90 MG/DL — SIGNIFICANT CHANGE UP (ref 70–99)
HCT VFR BLD CALC: 25.6 % — LOW (ref 34.5–45)
HGB BLD-MCNC: 8.1 G/DL — LOW (ref 11.5–15.5)
IANC: 6.12 K/UL — SIGNIFICANT CHANGE UP (ref 1.8–7.4)
IMM GRANULOCYTES NFR BLD AUTO: 0.6 % — SIGNIFICANT CHANGE UP (ref 0–0.9)
LYMPHOCYTES # BLD AUTO: 1.06 K/UL — SIGNIFICANT CHANGE UP (ref 1–3.3)
LYMPHOCYTES # BLD AUTO: 11.9 % — LOW (ref 13–44)
MAGNESIUM SERPL-MCNC: 2.3 MG/DL — SIGNIFICANT CHANGE UP (ref 1.6–2.6)
MCHC RBC-ENTMCNC: 24.7 PG — LOW (ref 27–34)
MCHC RBC-ENTMCNC: 31.6 GM/DL — LOW (ref 32–36)
MCV RBC AUTO: 78 FL — LOW (ref 80–100)
MONOCYTES # BLD AUTO: 0.76 K/UL — SIGNIFICANT CHANGE UP (ref 0–0.9)
MONOCYTES NFR BLD AUTO: 8.5 % — SIGNIFICANT CHANGE UP (ref 2–14)
NEUTROPHILS # BLD AUTO: 6.12 K/UL — SIGNIFICANT CHANGE UP (ref 1.8–7.4)
NEUTROPHILS NFR BLD AUTO: 68.9 % — SIGNIFICANT CHANGE UP (ref 43–77)
NRBC # BLD: 0 /100 WBCS — SIGNIFICANT CHANGE UP (ref 0–0)
NRBC # FLD: 0 K/UL — SIGNIFICANT CHANGE UP (ref 0–0)
PHOSPHATE SERPL-MCNC: 4.8 MG/DL — HIGH (ref 2.5–4.5)
PLATELET # BLD AUTO: 199 K/UL — SIGNIFICANT CHANGE UP (ref 150–400)
POTASSIUM SERPL-MCNC: 4.2 MMOL/L — SIGNIFICANT CHANGE UP (ref 3.5–5.3)
POTASSIUM SERPL-SCNC: 4.2 MMOL/L — SIGNIFICANT CHANGE UP (ref 3.5–5.3)
PROT SERPL-MCNC: 6.7 G/DL — SIGNIFICANT CHANGE UP (ref 6–8.3)
RBC # BLD: 3.28 M/UL — LOW (ref 3.8–5.2)
RBC # FLD: 17.3 % — HIGH (ref 10.3–14.5)
RH IG SCN BLD-IMP: POSITIVE — SIGNIFICANT CHANGE UP
SODIUM SERPL-SCNC: 134 MMOL/L — LOW (ref 135–145)
WBC # BLD: 8.89 K/UL — SIGNIFICANT CHANGE UP (ref 3.8–10.5)
WBC # FLD AUTO: 8.89 K/UL — SIGNIFICANT CHANGE UP (ref 3.8–10.5)

## 2022-10-06 PROCEDURE — 99232 SBSQ HOSP IP/OBS MODERATE 35: CPT | Mod: GC

## 2022-10-06 RX ORDER — AMLODIPINE BESYLATE 2.5 MG/1
10 TABLET ORAL ONCE
Refills: 0 | Status: COMPLETED | OUTPATIENT
Start: 2022-10-06 | End: 2022-10-06

## 2022-10-06 RX ADMIN — Medication 2: at 13:53

## 2022-10-06 RX ADMIN — Medication 100 MILLIGRAM(S): at 11:45

## 2022-10-06 RX ADMIN — Medication 50 MICROGRAM(S): at 05:31

## 2022-10-06 RX ADMIN — Medication 6 UNIT(S): at 09:15

## 2022-10-06 RX ADMIN — PANTOPRAZOLE SODIUM 40 MILLIGRAM(S): 20 TABLET, DELAYED RELEASE ORAL at 05:30

## 2022-10-06 RX ADMIN — Medication 0.1 MILLIGRAM(S): at 09:55

## 2022-10-06 RX ADMIN — HEPARIN SODIUM 5000 UNIT(S): 5000 INJECTION INTRAVENOUS; SUBCUTANEOUS at 18:14

## 2022-10-06 RX ADMIN — AMLODIPINE BESYLATE 10 MILLIGRAM(S): 2.5 TABLET ORAL at 09:55

## 2022-10-06 RX ADMIN — Medication 6 UNIT(S): at 18:05

## 2022-10-06 RX ADMIN — INSULIN GLARGINE 16 UNIT(S): 100 INJECTION, SOLUTION SUBCUTANEOUS at 09:14

## 2022-10-06 RX ADMIN — HEPARIN SODIUM 5000 UNIT(S): 5000 INJECTION INTRAVENOUS; SUBCUTANEOUS at 05:31

## 2022-10-06 RX ADMIN — Medication 80 MILLIGRAM(S): at 18:14

## 2022-10-06 RX ADMIN — ATORVASTATIN CALCIUM 40 MILLIGRAM(S): 80 TABLET, FILM COATED ORAL at 21:26

## 2022-10-06 RX ADMIN — Medication 100 MILLIGRAM(S): at 21:25

## 2022-10-06 RX ADMIN — Medication 50 MILLIGRAM(S): at 18:13

## 2022-10-06 RX ADMIN — Medication 6 UNIT(S): at 13:52

## 2022-10-06 RX ADMIN — Medication 1: at 18:05

## 2022-10-06 NOTE — DISCHARGE NOTE PROVIDER - CARE PROVIDERS DIRECT ADDRESSES
,DirectAddress_Unknown ,DirectAddress_Unknown,DirectAddress_Unknown,[email or web address]

## 2022-10-06 NOTE — PROGRESS NOTE ADULT - SUBJECTIVE AND OBJECTIVE BOX
[facility] DIVISION OF KIDNEY DISEASES AND HYPERTENSION   FOLLOW UP NOTE    --------------------------------------------------------------------------------  HPI: 57-year-old female with PMH of HTN, HLD, DM2, anemia presents to ED for progressive and worsening SOB. Pt on admission was found to have volume overload. Initial work up in Er showed elevated Scr. Nephrology following for ISAMAR. .     Scr on admission was 1.4 on 9/27, and increased to 1.70 on 9/29. Pt was started on IV diuretics (lasix) for volume overload. Pt. says she was recently advised to see a nephrologist as outpatient. No prior labs on [facility]. Pt gives history recent admission at [facility] for similar complaints.     Patient seen & examined today. Pt. feels better but gives history of B/L LE edema. Denies chest pain, fever, chills, dysuria and hematuria. Scr elevated/stable at 1.9 today.    PAST HISTORY  --------------------------------------------------------------------------------  No significant changes to PMH, PSH, FHx, SHx, unless otherwise noted    ALLERGIES & MEDICATIONS  --------------------------------------------------------------------------------  Allergies  No Known Allergies    Intolerances    Standing Inpatient Medications  amLODIPine   Tablet 10 milliGRAM(s) Oral daily  atorvastatin 40 milliGRAM(s) Oral at bedtime  dextrose 5%. 1000 milliLiter(s) IV Continuous <Continuous>  dextrose 5%. 1000 milliLiter(s) IV Continuous <Continuous>  dextrose 50% Injectable 25 Gram(s) IV Push once  dextrose 50% Injectable 12.5 Gram(s) IV Push once  dextrose 50% Injectable 25 Gram(s) IV Push once  furosemide   Injectable 80 milliGRAM(s) IV Push every 12 hours  glucagon  Injectable 1 milliGRAM(s) IntraMuscular once  heparin   Injectable 5000 Unit(s) SubCutaneous every 12 hours  hydrALAZINE 100 milliGRAM(s) Oral three times a day  influenza   Vaccine 0.5 milliLiter(s) IntraMuscular once  insulin glargine Injectable (LANTUS) 16 Unit(s) SubCutaneous every morning  insulin lispro (ADMELOG) corrective regimen sliding scale   SubCutaneous three times a day before meals  insulin lispro (ADMELOG) corrective regimen sliding scale   SubCutaneous at bedtime  insulin lispro Injectable (ADMELOG) 6 Unit(s) SubCutaneous three times a day before meals  labetalol 50 milliGRAM(s) Oral two times a day  levothyroxine 50 MICROGram(s) Oral daily  metolazone 2.5 milliGRAM(s) Oral once  pantoprazole    Tablet 40 milliGRAM(s) Oral before breakfast    PRN Inpatient Medications  acetaminophen     Tablet .. 650 milliGRAM(s) Oral every 6 hours PRN  dextrose Oral Gel 15 Gram(s) Oral once PRN    REVIEW OF SYSTEMS  --------------------------------------------------------------------------------  Gen: No fever  Head/Eyes/Ears: No HA   Respiratory: dyspnea (on admission), no cough  CV: No chest pain  GI: No abdominal pain, diarrhea  : No dysuria, hematuria  MSK: LE edema+  Skin: No rashes  Heme: No easy bruising or bleeding    All other systems were reviewed and are negative, except as noted.    VITALS/PHYSICAL EXAM  --------------------------------------------------------------------------------  T(C): 36.4 (10-06-22 @ 11:32), Max: 36.7 (10-05-22 @ 18:00)  HR: 68 (10-06-22 @ 11:32) (60 - 68)  BP: 136/51 (10-06-22 @ 11:32) (127/47 - 140/47)  RR: 18 (10-06-22 @ 11:32) (18 - 19)  SpO2: 100% (10-06-22 @ 11:32) (98% - 100%)  Wt(kg): --    Weight (kg): 66.5 (10-04-22 @ 23:17)    10-05-22 @ 07:01  -  10-06-22 @ 07:00  --------------------------------------------------------  IN: 0 mL / OUT: 2450 mL / NET: -2450 mL      Physical Exam:              Gen: resting, NAD  	HEENT: Anicteric  	Pulm: CTA B/L  	CV: S1S2+  	Abd: Soft, +BS      	Ext: Decreased B/L LE edema+   	Neuro: Awake, alert        	Skin: Warm and dry    LABS/STUDIES  --------------------------------------------------------------------------------              8.1    8.89  >-----------<  199      [10-06-22 @ 06:13]              25.6     134  |  98  |  86  ----------------------------<  90      [10-06-22 @ 06:13]  4.2   |  22  |  1.99        Ca     9.4     [10-06-22 @ 06:13]      Mg     2.30     [10-06-22 @ 06:13]      Phos  4.8     [10-06-22 @ 06:13]    Creatinine Trend:  SCr 1.99 [10-06 @ 06:13]  SCr 1.92 [10-05 @ 06:25]  SCr 1.96 [10-04 @ 05:20]  SCr 1.94 [10-03 @ 07:05]  SCr 1.75 [10-02 @ 06:22]    Urinalysis - [09-28-22 @ 07:43]      Color Light Yellow / Appearance Clear / SG 1.014 / pH 6.0      Gluc >= 1000 mg/dL / Ketone Negative  / Bili Negative / Urobili <2 mg/dL       Blood Trace / Protein 300 mg/dL / Leuk Est Negative / Nitrite Negative      RBC 4 / WBC 1 / Hyaline 1 / Gran  / Sq Epi  / Non Sq Epi 1 / Bacteria Negative    Urine Creatinine 22      [10-02-22 @ 23:29]  Urine Protein 30      [10-02-22 @ 23:29]  Urine Sodium 101      [10-02-22 @ 23:29]  Urine Urea Nitrogen 260.1      [10-02-22 @ 23:29]  Urine Potassium 20.9      [10-02-22 @ 23:29]  Urine Chloride 102      [10-02-22 @ 23:29]  Urine Osmolality 337      [10-02-22 @ 23:29] [facility] DIVISION OF KIDNEY DISEASES AND HYPERTENSION   FOLLOW UP NOTE    --------------------------------------------------------------------------------  HPI: 57-year-old female with PMH of HTN, HLD, DM2, anemia presents to ED for progressive and worsening SOB. Pt on admission was found to have volume overload. Initial work up in Er showed elevated Scr. Nephrology following for ISAMAR. .     Scr on admission was 1.4 on 9/27, and increased to 1.70 on 9/29. Pt was started on IV diuretics (lasix) for volume overload. Pt. says she was recently advised to see a nephrologist as outpatient. No prior labs on [facility]. Pt gives history recent admission at [facility] for similar complaints.     Pt. seen and examined today. Pt. feels better but gives history of B/L LE edema. Denies chest pain, fever, chills, dysuria and hematuria. Scr elevated/stable at 1.9 today.    PAST HISTORY  --------------------------------------------------------------------------------  No significant changes to PMH, PSH, FHx, SHx, unless otherwise noted    ALLERGIES & MEDICATIONS  --------------------------------------------------------------------------------  Allergies  No Known Allergies    Intolerances    Standing Inpatient Medications  amLODIPine   Tablet 10 milliGRAM(s) Oral daily  atorvastatin 40 milliGRAM(s) Oral at bedtime  dextrose 5%. 1000 milliLiter(s) IV Continuous <Continuous>  dextrose 5%. 1000 milliLiter(s) IV Continuous <Continuous>  dextrose 50% Injectable 25 Gram(s) IV Push once  dextrose 50% Injectable 12.5 Gram(s) IV Push once  dextrose 50% Injectable 25 Gram(s) IV Push once  furosemide   Injectable 80 milliGRAM(s) IV Push every 12 hours  glucagon  Injectable 1 milliGRAM(s) IntraMuscular once  heparin   Injectable 5000 Unit(s) SubCutaneous every 12 hours  hydrALAZINE 100 milliGRAM(s) Oral three times a day  influenza   Vaccine 0.5 milliLiter(s) IntraMuscular once  insulin glargine Injectable (LANTUS) 16 Unit(s) SubCutaneous every morning  insulin lispro (ADMELOG) corrective regimen sliding scale   SubCutaneous three times a day before meals  insulin lispro (ADMELOG) corrective regimen sliding scale   SubCutaneous at bedtime  insulin lispro Injectable (ADMELOG) 6 Unit(s) SubCutaneous three times a day before meals  labetalol 50 milliGRAM(s) Oral two times a day  levothyroxine 50 MICROGram(s) Oral daily  metolazone 2.5 milliGRAM(s) Oral once  pantoprazole    Tablet 40 milliGRAM(s) Oral before breakfast    PRN Inpatient Medications  acetaminophen     Tablet .. 650 milliGRAM(s) Oral every 6 hours PRN  dextrose Oral Gel 15 Gram(s) Oral once PRN    REVIEW OF SYSTEMS  --------------------------------------------------------------------------------  Gen: No fever  Head/Eyes/Ears: No HA   Respiratory: dyspnea (on admission), no cough  CV: No chest pain  GI: No abdominal pain, diarrhea  : No dysuria, hematuria  MSK: LE edema+  Skin: No rashes  Heme: No easy bruising or bleeding    All other systems were reviewed and are negative, except as noted.    VITALS/PHYSICAL EXAM  --------------------------------------------------------------------------------  T(C): 36.4 (10-06-22 @ 11:32), Max: 36.7 (10-05-22 @ 18:00)  HR: 68 (10-06-22 @ 11:32) (60 - 68)  BP: 136/51 (10-06-22 @ 11:32) (127/47 - 140/47)  RR: 18 (10-06-22 @ 11:32) (18 - 19)  SpO2: 100% (10-06-22 @ 11:32) (98% - 100%)  Wt(kg): --    Weight (kg): 66.5 (10-04-22 @ 23:17)    10-05-22 @ 07:01  -  10-06-22 @ 07:00  --------------------------------------------------------  IN: 0 mL / OUT: 2450 mL / NET: -2450 mL    Physical Exam:              Gen: resting, NAD  	HEENT: Anicteric  	Pulm: CTA B/L  	CV: S1S2+  	Abd: Soft, +BS      	Ext: Decreased B/L LE edema+   	Neuro: Awake, alert        	Skin: Warm and dry    LABS/STUDIES  --------------------------------------------------------------------------------              8.1    8.89  >-----------<  199      [10-06-22 @ 06:13]              25.6     134  |  98  |  86  ----------------------------<  90      [10-06-22 @ 06:13]  4.2   |  22  |  1.99        Ca     9.4     [10-06-22 @ 06:13]      Mg     2.30     [10-06-22 @ 06:13]      Phos  4.8     [10-06-22 @ 06:13]    Creatinine Trend:  SCr 1.99 [10-06 @ 06:13]  SCr 1.92 [10-05 @ 06:25]  SCr 1.96 [10-04 @ 05:20]  SCr 1.94 [10-03 @ 07:05]  SCr 1.75 [10-02 @ 06:22]    Urinalysis - [09-28-22 @ 07:43]      Color Light Yellow / Appearance Clear / SG 1.014 / pH 6.0      Gluc >= 1000 mg/dL / Ketone Negative  / Bili Negative / Urobili <2 mg/dL       Blood Trace / Protein 300 mg/dL / Leuk Est Negative / Nitrite Negative      RBC 4 / WBC 1 / Hyaline 1 / Gran  / Sq Epi  / Non Sq Epi 1 / Bacteria Negative    Urine Creatinine 22      [10-02-22 @ 23:29]  Urine Protein 30      [10-02-22 @ 23:29]  Urine Sodium 101      [10-02-22 @ 23:29]  Urine Urea Nitrogen 260.1      [10-02-22 @ 23:29]  Urine Potassium 20.9      [10-02-22 @ 23:29]  Urine Chloride 102      [10-02-22 @ 23:29]  Urine Osmolality 337      [10-02-22 @ 23:29]

## 2022-10-06 NOTE — DISCHARGE NOTE PROVIDER - HOSPITAL COURSE
Discharge Summary     Admission diagnoses:   HTN emergency  acute hypoxic respiratory failure  heart failure preserved ejection fraction  ISAMAR on CKD    Discharge diagnoses:   acute hypoxic respiratory failure 2/2 heart failure preserved ejection fraction-> improved  pulmonary htn  ISAMAR on CKD    Hospital Course:   For full details, please see H&P, progress notes, consult notes and ancillary notes. Briefly, Shila Hernandez is a 57yFemale with a history of of HTN, HLD, DM2, anemia presenting w resistant hypertension. The patient's hospital course will be summarized in a problem based format.    # HTN emergency  Patient presented in emergency with flash pulmonary edema. She was started on BIPAP and weaned to room air during hospitalization. She     # ***    On day of discharge, patient is clinically stable with no new exam findings or acute symptoms compared to prior. The patient was seen by the attending physician on the date of discharge and deemed stable and acceptable for discharge. The patient's chronic medical conditions were treated accordingly per the patient's home medication regimen. The patient's medication reconciliation (with changes made to chronic medications), follow up appointments, discharge orders, instructions, and significant lab and diagnostic studies are as noted.     Discharge follow up action items:     1. Follow up with PCP in 1-2 weeks.   2. Follow up labs CBC, CMP in 1 week.   3. Medication changes STOP clonidine, START torsemide      Patient's ordered code status: full    Patient disposition: home        Discharge Summary     Admission diagnoses:   HTN emergency  acute hypoxic respiratory failure  heart failure preserved ejection fraction  ISAMAR on CKD    Discharge diagnoses:   acute hypoxic respiratory failure 2/2 heart failure preserved ejection fraction-> improved  pulmonary htn  ISAMAR on CKD    Hospital Course:   For full details, please see H&P, progress notes, consult notes and ancillary notes. Briefly, Shila Hernandez is a 57yFemale with a history of of HTN, HLD, DM2, anemia presenting w resistant hypertension. The patient's hospital course will be summarized in a problem based format.    # HTN emergency  Patient presented in emergency with flash pulmonary edema. She was started on BIPAP and weaned to room air during hospitalization. Her medications were titrated and she will discharge on hydralazine 100mg TID, labetalol 50mg BID, amlodipine 10mg daily. Secondary HTN work-up negative. Normal plasma/renin ratio, normal metanephrines, renal vascular US without significant stenosis.     # Heart failure with preserved ejection fraction exacerbation  Patient had echo with preserved EF. Echo showed pulmonary HTN. Patient was diuresed with IV diuretics. She was approaching euvolemia at time of discharge. She will continue oral diuretics with torsemide 40mg on discharge.     #ISAMAR in CKD  Patient     On day of discharge, patient is clinically stable with no new exam findings or acute symptoms compared to prior. The patient was seen by the attending physician on the date of discharge and deemed stable and acceptable for discharge. The patient's chronic medical conditions were treated accordingly per the patient's home medication regimen. The patient's medication reconciliation (with changes made to chronic medications), follow up appointments, discharge orders, instructions, and significant lab and diagnostic studies are as noted.     Discharge follow up action items:     1. Follow up with PCP in 1-2 weeks.   2. Follow up labs CBC, CMP in 1 week.   3. Medication changes STOP clonidine, START torsemide      Patient's ordered code status: full    Patient disposition: home        Discharge Summary     Admission diagnoses:   HTN emergency  acute hypoxic respiratory failure  heart failure preserved ejection fraction  ISAMAR on CKD    Discharge diagnoses:   acute hypoxic respiratory failure 2/2 heart failure preserved ejection fraction-> improved  pulmonary htn  ISAMAR on CKD    Hospital Course:   For full details, please see H&P, progress notes, consult notes and ancillary notes. Briefly, Shila Hernandez is a 57yFemale with a history of of HTN, HLD, DM2, anemia presenting w resistant hypertension. The patient's hospital course will be summarized in a problem based format.    # HTN emergency  Patient presented in emergency with flash pulmonary edema. She was started on BIPAP and weaned to room air during hospitalization. Her medications were titrated and she will discharge on hydralazine 100mg TID, labetalol 50mg BID, amlodipine 10mg daily. Secondary HTN work-up negative. Normal plasma/renin ratio, normal metanephrines, renal vascular US without significant stenosis.     # Heart failure with preserved ejection fraction exacerbation  Patient had echo with preserved EF. Echo showed pulmonary HTN. She was evaluated by cardiology. Patient was diuresed with IV diuretics. She was approaching euvolemia at time of discharge. She will continue oral diuretics with torsemide 40mg on discharge.     #ISAMAR in CKD  Patient was noted to have elevated creatinine. Creatinine initially per with diuresis, but they remained steady. Likely that patient has CKD and was initially volume overloaded and dilution caused decreased creatinine value. Patient should follow up outpatient with nephrology.     On day of discharge, patient is clinically stable with no new exam findings or acute symptoms compared to prior. The patient was seen by the attending physician on the date of discharge and deemed stable and acceptable for discharge. The patient's chronic medical conditions were treated accordingly per the patient's home medication regimen. The patient's medication reconciliation (with changes made to chronic medications), follow up appointments, discharge orders, instructions, and significant lab and diagnostic studies are as noted.     Discharge follow up action items:     1. Follow up with PCP in 1-2 weeks.   2. Follow up labs CBC, CMP in 1 week.   3. Medication changes STOP clonidine, START torsemide      Patient's ordered code status: full    Patient disposition: home        Discharge Summary     Admission diagnoses:   HTN emergency  acute hypoxic respiratory failure  heart failure preserved ejection fraction  ISAMAR on CKD    Discharge diagnoses:   acute hypoxic respiratory failure 2/2 heart failure preserved ejection fraction-> improved  pulmonary htn  ISAMAR on CKD    Hospital Course:   For full details, please see H&P, progress notes, consult notes and ancillary notes. Briefly, Shila Hernandez is a 57yFemale with a history of of HTN, HLD, DM2, anemia presenting w resistant hypertension. The patient's hospital course will be summarized in a problem based format.    # HTN emergency  Patient presented in emergency with flash pulmonary edema. She was started on BIPAP and weaned to room air during hospitalization. Her medications were titrated and she will discharge on hydralazine 100mg TID, labetalol 50mg BID, amlodipine 10mg daily. Secondary HTN work-up negative. Normal plasma/renin ratio, normal metanephrines, renal vascular US without significant stenosis.     # Heart failure with preserved ejection fraction exacerbation  Patient had echo with preserved EF. Echo showed pulmonary HTN. She was evaluated by cardiology. Patient was diuresed with IV diuretics. She was approaching euvolemia at time of discharge. She will continue oral diuretics with torsemide 40mg on discharge.     #ISAMAR in CKD  Patient was noted to have elevated creatinine. Creatinine initially per with diuresis, but they remained steady. Likely that patient has CKD and was initially volume overloaded and dilution caused decreased creatinine value. Patient should follow up outpatient with nephrology.     On day of discharge, patient is clinically stable with no new exam findings or acute symptoms compared to prior. The patient was seen by the attending physician on the date of discharge and deemed stable and acceptable for discharge. The patient's chronic medical conditions were treated accordingly per the patient's home medication regimen. The patient's medication reconciliation (with changes made to chronic medications), follow up appointments, discharge orders, instructions, and significant lab and diagnostic studies are as noted.     Discharge follow up action items:     1. Follow up with PCP in 1 weeks. Follow with cardiology next week.   2. Follow up labs CBC, CMP in 1 week.   3. Medication changes STOP clonidine, START torsemide      Patient's ordered code status: full    Patient disposition: home        Discharge Summary     Admission diagnoses:   HTN emergency  acute hypoxic respiratory failure  heart failure preserved ejection fraction  ISAMAR on CKD    Discharge diagnoses:   acute hypoxic respiratory failure 2/2 heart failure preserved ejection fraction-> improved  pulmonary htn  ISAMAR on CKD    Hospital Course:   For full details, please see H&P, progress notes, consult notes and ancillary notes. Briefly, Shila Hernandez is a 57yFemale with a history of of HTN, HLD, DM2, anemia presenting w resistant hypertension. The patient's hospital course will be summarized in a problem based format.    # HTN emergency  Patient presented in emergency with flash pulmonary edema. She was started on BIPAP and weaned to room air during hospitalization. Her medications were titrated and she will discharge on hydralazine 100mg TID, labetalol 50mg BID, amlodipine 10mg daily. Secondary HTN work-up negative. Normal plasma/renin ratio, normal metanephrines, renal vascular US without significant stenosis.     # Heart failure with preserved ejection fraction exacerbation  Patient had echo with preserved EF. Echo showed pulmonary HTN. She was evaluated by cardiology. Patient was diuresed with IV diuretics. She was approaching euvolemia at time of discharge. She will continue oral diuretics with torsemide 40mg on discharge.     #ISAMAR in CKD  Patient was noted to have elevated creatinine. Creatinine initially per with diuresis, but they remained steady. Likely that patient has CKD and was initially volume overloaded and dilution caused decreased creatinine value. Patient should follow up outpatient with nephrology.     On day of discharge, patient is clinically stable with no new exam findings or acute symptoms compared to prior. The patient was seen by the attending physician on the date of discharge and deemed stable and acceptable for discharge. The patient's chronic medical conditions were treated accordingly per the patient's home medication regimen. The patient's medication reconciliation (with changes made to chronic medications), follow up appointments, discharge orders, instructions, and significant lab and diagnostic studies are as noted.     Discharge follow up action items:     1. Follow up with PCP in 1 weeks. Follow with cardiology next week.   2. Follow up labs CBC, CMP in 1 week.   3. Medication changes STOP clonidine, START torsemide    Patient's ordered code status: full    Patient disposition: home        Discharge Summary     Admission diagnoses:   HTN emergency  acute hypoxic respiratory failure  heart failure preserved ejection fraction  ISAMAR on CKD    Discharge diagnoses:   acute hypoxic respiratory failure 2/2 heart failure preserved ejection fraction-> improved  pulmonary htn  ISAMAR on CKD    Hospital Course:   For full details, please see H&P, progress notes, consult notes and ancillary notes. Briefly, Shila Hernandez is a 57yFemale with a history of of HTN, HLD, DM2, anemia presenting w resistant hypertension. The patient's hospital course will be summarized in a problem based format.    # HTN emergency  Patient presented in emergency with flash pulmonary edema. She was started on BIPAP and weaned to room air during hospitalization. Her medications were titrated and she will discharge on hydralazine 100mg TID, labetalol 50mg BID, amlodipine 10mg daily. Secondary HTN work-up negative. Normal plasma/renin ratio, normal metanephrines, renal vascular US without significant stenosis.     # Heart failure with preserved ejection fraction exacerbation  Patient had echo with preserved EF. Echo showed pulmonary HTN. She was evaluated by cardiology. Patient was diuresed with IV diuretics. She was approaching euvolemia at time of discharge. She will continue oral diuretics with torsemide 40mg on discharge.     #IASMAR on CKD  Patient was noted to have elevated creatinine. Creatinine initially per with diuresis, but they remained steady. Likely that patient has CKD and was initially volume overloaded and dilution caused decreased creatinine value. Patient should follow up outpatient with nephrology.     On day of discharge, patient is clinically stable with no new exam findings or acute symptoms compared to prior. The patient was seen by the attending physician on the date of discharge and deemed stable and acceptable for discharge. The patient's chronic medical conditions were treated accordingly per the patient's home medication regimen. The patient's medication reconciliation (with changes made to chronic medications), follow up appointments, discharge orders, instructions, and significant lab and diagnostic studies are as noted.     Discharge follow up action items:     1. Follow up with PCP in 1 weeks. Follow with cardiology next week.   2. Follow up labs CBC, CMP in 1 week.   3. Medication changes STOP clonidine, START torsemide    Patient's ordered code status: full    Patient disposition: home        Discharge Summary     Admission diagnoses:   HTN emergency  acute hypoxic respiratory failure  heart failure preserved ejection fraction  ISAMAR on CKD    Discharge diagnoses:   acute hypoxic respiratory failure 2/2 heart failure preserved ejection fraction-> improved  pulmonary htn  ISAMAR on CKD    Hospital Course:   For full details, please see H&P, progress notes, consult notes and ancillary notes. Briefly, Shila Hernandez is a 57yFemale with a history of of HTN, HLD, DM2, anemia presenting w resistant hypertension. The patient's hospital course will be summarized in a problem based format.    # HTN emergency  Patient presented in emergency with flash pulmonary edema. She was started on BIPAP and weaned to room air during hospitalization. Her medications were titrated and she will discharge on hydralazine 100mg TID, labetalol 50mg BID, amlodipine 10mg daily. Secondary HTN work-up negative. Normal plasma/renin ratio, normal metanephrines, renal vascular US without significant stenosis.     # Heart failure with preserved ejection fraction exacerbation  Patient had echo with preserved EF. Echo showed pulmonary HTN. She was evaluated by cardiology. Patient was diuresed with IV diuretics. She was approaching euvolemia at time of discharge. She will continue oral diuretics with torsemide 40mg on discharge.     #ISAMAR on CKD  Patient was noted to have elevated creatinine. Creatinine initially per with diuresis, but they remained steady. Likely that patient has CKD and was initially volume overloaded and dilution caused decreased creatinine value. Patient should follow up outpatient with nephrology. Creatinine at time of discharge was 1.9-2.0.     On day of discharge, patient is clinically stable with no new exam findings or acute symptoms compared to prior. The patient was seen by the attending physician on the date of discharge and deemed stable and acceptable for discharge. The patient's chronic medical conditions were treated accordingly per the patient's home medication regimen. The patient's medication reconciliation (with changes made to chronic medications), follow up appointments, discharge orders, instructions, and significant lab and diagnostic studies are as noted.     Discharge follow up action items:     1. Follow up with PCP in 1 weeks. Follow with cardiology next week.   2. Follow up labs CBC, CMP in 1 week.   3. Medication changes STOP clonidine, START torsemide    Patient's ordered code status: full    Patient disposition: home        Discharge Summary     Admission diagnoses:   HTN emergency  acute hypoxic respiratory failure  heart failure preserved ejection fraction  ISAMAR on CKD    Discharge diagnoses:   acute hypoxic respiratory failure 2/2 heart failure preserved ejection fraction-> improved  pulmonary htn  ISAMAR on CKD    Hospital Course:   For full details, please see H&P, progress notes, consult notes and ancillary notes. Briefly, Shila Hernandez is a 57yFemale with a history of of HTN, HLD, DM2, anemia presenting w resistant hypertension. The patient's hospital course will be summarized in a problem based format.    # HTN emergency  Patient presented in emergency with flash pulmonary edema. She was started on BIPAP and weaned to room air during hospitalization. Her medications were titrated and she will discharge on hydralazine 100mg TID, labetalol 50mg BID, amlodipine 10mg daily. Secondary HTN work-up negative. Normal plasma/renin ratio, normal metanephrines, renal vascular US without significant stenosis.     # Heart failure with preserved ejection fraction exacerbation  Patient had echo with preserved EF. Echo showed pulmonary HTN. She was evaluated by cardiology. Patient was diuresed with IV diuretics. She was approaching euvolemia at time of discharge. She will continue oral diuretics with torsemide 40mg on discharge.     #ISAMAR on CKD  Patient was noted to have elevated creatinine. Creatinine initially per with diuresis, but they remained steady. Likely that patient has CKD and was initially volume overloaded and dilution caused decreased creatinine value. Patient should follow up outpatient with nephrology. Creatinine at time of discharge was 1.9-2.0.     On day of discharge, patient is clinically stable with no new exam findings or acute symptoms compared to prior. The patient was seen by the attending physician on the date of discharge and deemed stable and acceptable for discharge. The patient's chronic medical conditions were treated accordingly per the patient's home medication regimen. The patient's medication reconciliation (with changes made to chronic medications), follow up appointments, discharge orders, instructions, and significant lab and diagnostic studies are as noted.     Discharge follow up action items:     1. Follow up with PCP in 1 weeks. Follow with cardiology next week.   2. Follow up labs CBC, CMP in 1 week.   3. Medication changes STOP clonidine, START torsemide. Increased levemir to 18U.     Patient's ordered code status: full    Patient disposition: home        Discharge Summary     Admission diagnoses:   HTN emergency  acute hypoxic respiratory failure  heart failure preserved ejection fraction  ISAMAR on CKD    Discharge diagnoses:   acute hypoxic respiratory failure 2/2 heart failure preserved ejection fraction-> improved  pulmonary htn  ISAMAR on CKD    Hospital Course:   For full details, please see H&P, progress notes, consult notes and ancillary notes. Briefly, Shila Hernandez is a 57yFemale with a history of of HTN, HLD, DM2, anemia presenting with shortness of breath. Patient was found to be hypoxic and required Bipap for pulmonary edema secondary to hypertensive emergency. The patient's hospital course will be summarized in a problem based format.    # HTN emergency  Patient presented in emergency with flash pulmonary edema. She was started on BIPAP and weaned to room air during hospitalization. Her medications were titrated and she will discharge on hydralazine 100mg TID, labetalol 50mg BID, amlodipine 10mg daily. Clonidine was weaned off. Secondary HTN work-up negative. Normal plasma/renin ratio, normal metanephrines, renal vascular US without significant stenosis.     # Heart failure with preserved ejection fraction exacerbation  Patient had echo with preserved EF. Echo showed pulmonary HTN. She was evaluated by cardiology. Patient was diuresed with IV diuretics. She was approaching euvolemia at time of discharge. She will continue oral diuretics with torsemide 40mg on discharge.     # Pulmonary hypertension   Patient noted to have severe pulmonary hypertension on admission TTE. She was seen by Cardiology and it was thought to be 2/2 hypervolemia. PASP improved with diuresis and patient had moderate pulmonary hypertension on repeat TTE. She will need to follow up with Cardiology for continued monitoring.     #ISAMAR on CKD  Patient was noted to have elevated creatinine. Creatinine initially per with diuresis, but they remained steady. Likely that patient has CKD and was initially volume overloaded and dilution caused decreased creatinine value. Patient should follow up outpatient with nephrology. Creatinine at time of discharge was 1.9-2.0.     On day of discharge, patient is clinically stable with no new exam findings or acute symptoms compared to prior. The patient was seen by the attending physician on the date of discharge and deemed stable and acceptable for discharge. The patient's chronic medical conditions were treated accordingly per the patient's home medication regimen. The patient's medication reconciliation (with changes made to chronic medications), follow up appointments, discharge orders, instructions, and significant lab and diagnostic studies are as noted.     Discharge follow up action items:     1. Follow up with PCP in 1 weeks. Follow with cardiology next week.   2. Follow up labs CBC, CMP in 1 week.   3. Medication changes STOP clonidine, START torsemide. Increased levemir to 18U.     Patient's ordered code status: full    Patient disposition: home

## 2022-10-06 NOTE — PROGRESS NOTE ADULT - PROBLEM SELECTOR PLAN 4
Per pt takes 20 of levemir depending on FSGs at home. Missed night of admission  - 16u lantus  - continue premeal 6 units  - c/w low dose sliding scale  - ctm blood sugars for adjustment

## 2022-10-06 NOTE — PROGRESS NOTE ADULT - SUBJECTIVE AND OBJECTIVE BOX
Walter Kincaid, PGY2    DATE OF SERVICE: 10-06-22 @ 06:11    Patient is a 57y old  Female who presents with a chief complaint of shortness of breath (05 Oct 2022 07:31)      SUBJECTIVE / OVERNIGHT EVENTS: No acute events overnight. Patient's hydralazine PM dose was held for BP in 120s and low diastolic value. Patient seen and examined at bedside this AM.     MEDICATIONS  (STANDING):  amLODIPine   Tablet 10 milliGRAM(s) Oral daily  atorvastatin 40 milliGRAM(s) Oral at bedtime  cloNIDine 0.1 milliGRAM(s) Oral daily  dextrose 5%. 1000 milliLiter(s) (100 mL/Hr) IV Continuous <Continuous>  dextrose 5%. 1000 milliLiter(s) (50 mL/Hr) IV Continuous <Continuous>  dextrose 50% Injectable 25 Gram(s) IV Push once  dextrose 50% Injectable 12.5 Gram(s) IV Push once  dextrose 50% Injectable 25 Gram(s) IV Push once  furosemide   Injectable 80 milliGRAM(s) IV Push every 12 hours  glucagon  Injectable 1 milliGRAM(s) IntraMuscular once  heparin   Injectable 5000 Unit(s) SubCutaneous every 12 hours  hydrALAZINE 100 milliGRAM(s) Oral three times a day  influenza   Vaccine 0.5 milliLiter(s) IntraMuscular once  insulin glargine Injectable (LANTUS) 16 Unit(s) SubCutaneous every morning  insulin lispro (ADMELOG) corrective regimen sliding scale   SubCutaneous three times a day before meals  insulin lispro (ADMELOG) corrective regimen sliding scale   SubCutaneous at bedtime  insulin lispro Injectable (ADMELOG) 6 Unit(s) SubCutaneous three times a day before meals  labetalol 50 milliGRAM(s) Oral two times a day  levothyroxine 50 MICROGram(s) Oral daily  pantoprazole    Tablet 40 milliGRAM(s) Oral before breakfast    MEDICATIONS  (PRN):  acetaminophen     Tablet .. 650 milliGRAM(s) Oral every 6 hours PRN Temp greater or equal to 38C (100.4F), Mild Pain (1 - 3), Moderate Pain (4 - 6)  dextrose Oral Gel 15 Gram(s) Oral once PRN Blood Glucose LESS THAN 70 milliGRAM(s)/deciliter      Vital Signs Last 24 Hrs  T(C): 36.6 (05 Oct 2022 21:30), Max: 36.7 (05 Oct 2022 13:00)  T(F): 97.9 (05 Oct 2022 21:30), Max: 98 (05 Oct 2022 13:00)  HR: 60 (05 Oct 2022 21:30) (59 - 61)  BP: 129/79 (05 Oct 2022 21:30) (122/47 - 136/74)  BP(mean): --  RR: 19 (05 Oct 2022 21:30) (19 - 19)  SpO2: 99% (05 Oct 2022 21:30) (98% - 99%)    Parameters below as of 05 Oct 2022 21:30  Patient On (Oxygen Delivery Method): room air      CAPILLARY BLOOD GLUCOSE      POCT Blood Glucose.: 124 mg/dL (05 Oct 2022 22:59)  POCT Blood Glucose.: 208 mg/dL (05 Oct 2022 18:38)  POCT Blood Glucose.: 333 mg/dL (05 Oct 2022 12:44)  POCT Blood Glucose.: 133 mg/dL (05 Oct 2022 08:46)    I&O's Summary    04 Oct 2022 07:01  -  05 Oct 2022 07:00  --------------------------------------------------------  IN: 860 mL / OUT: 1550 mL / NET: -690 mL    05 Oct 2022 07:01  -  06 Oct 2022 06:11  --------------------------------------------------------  IN: 0 mL / OUT: 1300 mL / NET: -1300 mL        PHYSICAL EXAM:  GENERAL: NAD, well-developed  HEAD:  Atraumatic, Normocephalic  EYES: EOMI, PERRLA, conjunctiva and sclera clear  NECK: Supple, No JVD  CHEST/LUNG: Clear to auscultation bilaterally; No wheeze  HEART: Regular rate and rhythm; No murmurs, rubs, or gallops  ABDOMEN: Soft, Nontender, Nondistended; Bowel sounds present  EXTREMITIES:  2+ Peripheral Pulses, No clubbing, cyanosis, or edema  PSYCH: AAOx3  NEUROLOGY: non-focal  SKIN: No rashes or lesions    LABS:                        7.8    8.22  )-----------( 179      ( 05 Oct 2022 06:25 )             24.2     10-05    136  |  98  |  83<H>  ----------------------------<  108<H>  4.3   |  22  |  1.92<H>    Ca    9.2      05 Oct 2022 06:25  Phos  4.7     10-05  Mg     1.80     10-05    TPro  6.4  /  Alb  3.9  /  TBili  0.4  /  DBili  x   /  AST  26  /  ALT  61<H>  /  AlkPhos  280<H>  10-05              RADIOLOGY & ADDITIONAL TESTS:    Imaging Personally Reviewed:    Consultant(s) Notes Reviewed:      Care Discussed with Consultants/Other Providers:   Walter Kincaid, PGY2    DATE OF SERVICE: 10-06-22 @ 06:11    Patient is a 57y old  Female who presents with a chief complaint of shortness of breath (05 Oct 2022 07:31)      SUBJECTIVE / OVERNIGHT EVENTS: No acute events overnight. Patient's hydralazine PM dose was held for BP in 120s and low diastolic value. AM anti-HTN meds held at 6AM. Patient seen and examined at bedside this AM.     MEDICATIONS  (STANDING):  amLODIPine   Tablet 10 milliGRAM(s) Oral daily  atorvastatin 40 milliGRAM(s) Oral at bedtime  cloNIDine 0.1 milliGRAM(s) Oral daily  dextrose 5%. 1000 milliLiter(s) (100 mL/Hr) IV Continuous <Continuous>  dextrose 5%. 1000 milliLiter(s) (50 mL/Hr) IV Continuous <Continuous>  dextrose 50% Injectable 25 Gram(s) IV Push once  dextrose 50% Injectable 12.5 Gram(s) IV Push once  dextrose 50% Injectable 25 Gram(s) IV Push once  furosemide   Injectable 80 milliGRAM(s) IV Push every 12 hours  glucagon  Injectable 1 milliGRAM(s) IntraMuscular once  heparin   Injectable 5000 Unit(s) SubCutaneous every 12 hours  hydrALAZINE 100 milliGRAM(s) Oral three times a day  influenza   Vaccine 0.5 milliLiter(s) IntraMuscular once  insulin glargine Injectable (LANTUS) 16 Unit(s) SubCutaneous every morning  insulin lispro (ADMELOG) corrective regimen sliding scale   SubCutaneous three times a day before meals  insulin lispro (ADMELOG) corrective regimen sliding scale   SubCutaneous at bedtime  insulin lispro Injectable (ADMELOG) 6 Unit(s) SubCutaneous three times a day before meals  labetalol 50 milliGRAM(s) Oral two times a day  levothyroxine 50 MICROGram(s) Oral daily  pantoprazole    Tablet 40 milliGRAM(s) Oral before breakfast    MEDICATIONS  (PRN):  acetaminophen     Tablet .. 650 milliGRAM(s) Oral every 6 hours PRN Temp greater or equal to 38C (100.4F), Mild Pain (1 - 3), Moderate Pain (4 - 6)  dextrose Oral Gel 15 Gram(s) Oral once PRN Blood Glucose LESS THAN 70 milliGRAM(s)/deciliter      Vital Signs Last 24 Hrs  T(C): 36.6 (05 Oct 2022 21:30), Max: 36.7 (05 Oct 2022 13:00)  T(F): 97.9 (05 Oct 2022 21:30), Max: 98 (05 Oct 2022 13:00)  HR: 60 (05 Oct 2022 21:30) (59 - 61)  BP: 129/79 (05 Oct 2022 21:30) (122/47 - 136/74)  BP(mean): --  RR: 19 (05 Oct 2022 21:30) (19 - 19)  SpO2: 99% (05 Oct 2022 21:30) (98% - 99%)    Parameters below as of 05 Oct 2022 21:30  Patient On (Oxygen Delivery Method): room air      CAPILLARY BLOOD GLUCOSE      POCT Blood Glucose.: 124 mg/dL (05 Oct 2022 22:59)  POCT Blood Glucose.: 208 mg/dL (05 Oct 2022 18:38)  POCT Blood Glucose.: 333 mg/dL (05 Oct 2022 12:44)  POCT Blood Glucose.: 133 mg/dL (05 Oct 2022 08:46)    I&O's Summary    04 Oct 2022 07:01  -  05 Oct 2022 07:00  --------------------------------------------------------  IN: 860 mL / OUT: 1550 mL / NET: -690 mL    05 Oct 2022 07:01  -  06 Oct 2022 06:11  --------------------------------------------------------  IN: 0 mL / OUT: 1300 mL / NET: -1300 mL        PHYSICAL EXAM:  CONSTITUTIONAL: NAD, well-developed  RESPIRATORY: Normal respiratory effort; lungs are clear to auscultation bilaterally  CARDIOVASCULAR: Regular rate and rhythm, normal S1 and S2, no murmur/rub/gallop; 1+lower extremity edema; Peripheral pulses are 2+ bilaterally  ABDOMEN: Nontender to palpation, normoactive bowel sounds, no rebound/guarding; No hepatosplenomegaly  MUSCULOSKELETAL: BL LE swelling 1+ edema, Right upper extremity swelling, full range of motion and sensation in right arm  PSYCH: A+O to person, place, and time; affect appropriate    LABS:                        7.8    8.22  )-----------( 179      ( 05 Oct 2022 06:25 )             24.2     10-05    136  |  98  |  83<H>  ----------------------------<  108<H>  4.3   |  22  |  1.92<H>    Ca    9.2      05 Oct 2022 06:25  Phos  4.7     10-05  Mg     1.80     10-05    TPro  6.4  /  Alb  3.9  /  TBili  0.4  /  DBili  x   /  AST  26  /  ALT  61<H>  /  AlkPhos  280<H>  10-05              RADIOLOGY & ADDITIONAL TESTS:    Imaging Personally Reviewed:    Consultant(s) Notes Reviewed:      Care Discussed with Consultants/Other Providers:   Walter Kincaid, PGY2    DATE OF SERVICE: 10-06-22 @ 06:11    Patient is a 57y old  Female who presents with a chief complaint of shortness of breath (05 Oct 2022 07:31)      SUBJECTIVE / OVERNIGHT EVENTS: No acute events overnight. Patient's hydralazine PM dose was held for BP in 120s and low diastolic value. AM anti-HTN meds held at 6AM. Patient seen and examined at bedside this AM.     Patient feels good. Denies chest pain, dyspnea, abd pain, n/v. Wanting to go home. Planning to diurese with IV lasix today and transition to PO tomorrow.     MEDICATIONS  (STANDING):  amLODIPine   Tablet 10 milliGRAM(s) Oral daily  atorvastatin 40 milliGRAM(s) Oral at bedtime  cloNIDine 0.1 milliGRAM(s) Oral daily  dextrose 5%. 1000 milliLiter(s) (100 mL/Hr) IV Continuous <Continuous>  dextrose 5%. 1000 milliLiter(s) (50 mL/Hr) IV Continuous <Continuous>  dextrose 50% Injectable 25 Gram(s) IV Push once  dextrose 50% Injectable 12.5 Gram(s) IV Push once  dextrose 50% Injectable 25 Gram(s) IV Push once  furosemide   Injectable 80 milliGRAM(s) IV Push every 12 hours  glucagon  Injectable 1 milliGRAM(s) IntraMuscular once  heparin   Injectable 5000 Unit(s) SubCutaneous every 12 hours  hydrALAZINE 100 milliGRAM(s) Oral three times a day  influenza   Vaccine 0.5 milliLiter(s) IntraMuscular once  insulin glargine Injectable (LANTUS) 16 Unit(s) SubCutaneous every morning  insulin lispro (ADMELOG) corrective regimen sliding scale   SubCutaneous three times a day before meals  insulin lispro (ADMELOG) corrective regimen sliding scale   SubCutaneous at bedtime  insulin lispro Injectable (ADMELOG) 6 Unit(s) SubCutaneous three times a day before meals  labetalol 50 milliGRAM(s) Oral two times a day  levothyroxine 50 MICROGram(s) Oral daily  pantoprazole    Tablet 40 milliGRAM(s) Oral before breakfast    MEDICATIONS  (PRN):  acetaminophen     Tablet .. 650 milliGRAM(s) Oral every 6 hours PRN Temp greater or equal to 38C (100.4F), Mild Pain (1 - 3), Moderate Pain (4 - 6)  dextrose Oral Gel 15 Gram(s) Oral once PRN Blood Glucose LESS THAN 70 milliGRAM(s)/deciliter      Vital Signs Last 24 Hrs  T(C): 36.6 (05 Oct 2022 21:30), Max: 36.7 (05 Oct 2022 13:00)  T(F): 97.9 (05 Oct 2022 21:30), Max: 98 (05 Oct 2022 13:00)  HR: 60 (05 Oct 2022 21:30) (59 - 61)  BP: 129/79 (05 Oct 2022 21:30) (122/47 - 136/74)  BP(mean): --  RR: 19 (05 Oct 2022 21:30) (19 - 19)  SpO2: 99% (05 Oct 2022 21:30) (98% - 99%)    Parameters below as of 05 Oct 2022 21:30  Patient On (Oxygen Delivery Method): room air      CAPILLARY BLOOD GLUCOSE      POCT Blood Glucose.: 124 mg/dL (05 Oct 2022 22:59)  POCT Blood Glucose.: 208 mg/dL (05 Oct 2022 18:38)  POCT Blood Glucose.: 333 mg/dL (05 Oct 2022 12:44)  POCT Blood Glucose.: 133 mg/dL (05 Oct 2022 08:46)    I&O's Summary    04 Oct 2022 07:01  -  05 Oct 2022 07:00  --------------------------------------------------------  IN: 860 mL / OUT: 1550 mL / NET: -690 mL    05 Oct 2022 07:01  -  06 Oct 2022 06:11  --------------------------------------------------------  IN: 0 mL / OUT: 1300 mL / NET: -1300 mL        PHYSICAL EXAM:  CONSTITUTIONAL: NAD, well-developed  RESPIRATORY: Normal respiratory effort; lungs are clear to auscultation bilaterally  CARDIOVASCULAR: Regular rate and rhythm, normal S1 and S2, no murmur/rub/gallop; trace-1+lower extremity edema; Peripheral pulses are 2+ bilaterally  ABDOMEN: Nontender to palpation, normoactive bowel sounds, no rebound/guarding; No hepatosplenomegaly  MUSCULOSKELETAL: BL LE swelling 1+ edema (improving), Right upper extremity swelling, full range of motion and sensation in right arm- improving  PSYCH: A+O to person, place, and time; affect appropriate    LABS:                        7.8    8.22  )-----------( 179      ( 05 Oct 2022 06:25 )             24.2     10-05    136  |  98  |  83<H>  ----------------------------<  108<H>  4.3   |  22  |  1.92<H>    Ca    9.2      05 Oct 2022 06:25  Phos  4.7     10-05  Mg     1.80     10-05    TPro  6.4  /  Alb  3.9  /  TBili  0.4  /  DBili  x   /  AST  26  /  ALT  61<H>  /  AlkPhos  280<H>  10-05              RADIOLOGY & ADDITIONAL TESTS:    Imaging Personally Reviewed:    Consultant(s) Notes Reviewed:      Care Discussed with Consultants/Other Providers:

## 2022-10-06 NOTE — DISCHARGE NOTE PROVIDER - NSDCMRMEDTOKEN_GEN_ALL_CORE_FT
amLODIPine 10 mg oral tablet: 1 tab(s) orally once a day  atorvastatin 40 mg oral tablet: 1 tab(s) orally once a day  cloNIDine 0.2 mg oral tablet: 1 tab(s) orally 2 times a day  hydrALAZINE 50 mg oral tablet: 1 tab(s) orally 3 times a day  labetalol 100 mg oral tablet: 0.5 tab(s) orally 2 times a day  levothyroxine 50 mcg (0.05 mg) oral capsule: 1 cap(s) orally once a day  metFORMIN 500 mg oral tablet: 1 tab(s) orally 2 times a day  pantoprazole 40 mg oral delayed release tablet: 1 tab(s) orally once a day  repaglinide 0.5 mg oral tablet: 1 tab(s) orally 3 times a day (before meals)   amLODIPine 10 mg oral tablet: 1 tab(s) orally once a day  atorvastatin 40 mg oral tablet: 1 tab(s) orally once a day  hydrALAZINE 100 mg oral tablet: 1 tab(s) orally 3 times a day  labetalol 100 mg oral tablet: 0.5 tab(s) orally 2 times a day  Levemir FlexPen 100 units/mL subcutaneous solution: 10 unit(s) subcutaneous once a day (at bedtime)  levothyroxine 50 mcg (0.05 mg) oral capsule: 1 cap(s) orally once a day  pantoprazole 40 mg oral delayed release tablet: 1 tab(s) orally once a day  repaglinide 0.5 mg oral tablet: 1 tab(s) orally 3 times a day (before meals)  torsemide 40 mg oral tablet: 1 tab(s) orally once a day   amLODIPine 10 mg oral tablet: 1 tab(s) orally once a day  atorvastatin 40 mg oral tablet: 1 tab(s) orally once a day  hydrALAZINE 100 mg oral tablet: 1 tab(s) orally 3 times a day  labetalol 100 mg oral tablet: 0.5 tab(s) orally 2 times a day  Levemir FlexPen 100 units/mL subcutaneous solution: 18 unit(s) subcutaneous once a day (at bedtime)   levothyroxine 50 mcg (0.05 mg) oral capsule: 1 cap(s) orally once a day  pantoprazole 40 mg oral delayed release tablet: 1 tab(s) orally once a day  repaglinide 0.5 mg oral tablet: 1 tab(s) orally 3 times a day (before meals)  torsemide 40 mg oral tablet: 1 tab(s) orally once a day

## 2022-10-06 NOTE — PROGRESS NOTE ADULT - ATTENDING COMMENTS
Pt. with ISAMAR on likely CKD. Pt. seen and examined earlier today. Pt. feels better and wants to go home. No fever, CP or SOB during rounds today. B/L LE edema has decreased, with diuretic therapy. Scr elevated/stable at 1.99 and SNa has improved to 134 today. Assessment and plan for ISAMAR as outlined above. Monitor labs and urine output. Avoid any potential nephrotoxins. Dose medications as per eGFR.

## 2022-10-06 NOTE — PROGRESS NOTE ADULT - PROBLEM SELECTOR PLAN 1
Pt. with non-oliguric ISAMAR on likely CKD in the setting of CHF. Scr on admission was 1.4 on 9/27, increased to 1.90 on 10/3/22. No prior labs on [facility]. Pt currently on IV Lasix 80 mg BID and PO Metolazone for volume overload/LE edema management as per primary team. Scr elevated/stable at 1.99 today. Pt. clinically stable. Recommend to switch to oral Torsemide 40 mg once daily upon discharge. Monitor labs and urine output. Daily weight. Avoid NSAIDs, ACEI/ARBS, RCA and nephrotoxins. Dose medications as per eGFR.

## 2022-10-06 NOTE — DISCHARGE NOTE PROVIDER - NSDCCPTREATMENT_GEN_ALL_CORE_FT
PRINCIPAL PROCEDURE  Procedure: Transthoracic echo  Findings and Treatment: DIMENSIONS: ****PRE-DIURESIS***  Dimensions:     Normal Values:  LA:     3.5 cm    2.0 - 4.0 cm  Ao:     2.5 cm    2.0 - 3.8 cm  SEPTUM: 0.7 cm    0.6 - 1.2 cm  PWT:    0.8 cm    0.6 - 1.1 cm  LVIDd:  4.6 cm    3.0 - 5.6 cm  LVIDs:  3.0 cm    1.8 - 4.0 cm  Derived Variables:  LVMI: 68 g/m2  RWT: 0.34  Fractional short: 35 %  Ejection Fraction (Modified Macedo Rule): 64 %  ------------------------------------------------------------------------  OBSERVATIONS:  Mitral Valve: Normal mitral valve. Mild mitral  regurgitation.  Aortic Root: Normal aortic root.  Aortic Valve: Normal trileaflet aortic valve.  Left Atrium: Mildly dilated left atrium.  LA volume index =  36 cc/m2.  Left Ventricle: Normal left ventricular systolic function.  No segmental wall motion abnormalities. Normal left  ventricular internal dimensions and wall thicknesses.  Right Heart: Normal right atrium. The right ventricle is  notwell visualized; grossly normal right ventricular  systolic function. Normal tricuspid valve.  Mild-moderate  tricuspid regurgitation. Normal pulmonic valve. Mild  pulmonic regurgitation.  Pericardium/PleuraSmall pericardial effusion posterior to  the left ventricle.  Hemodynamic: Estimated right ventricular systolic pressure  equals 68 mm Hg, assuming right atrial pressure equals 10  mm Hg, consistent with severe pulmonary hypertension.  ------------------------------------------------------------------------  CONCLUSIONS:  1. Normal mitral valve. Mild mitral regurgitation.  2. Mildly dilated left atrium.  LA volume index = 36 cc/m2.  3. Normal left ventricular internal dimensions and wall  thicknesses.  4. Normal left ventricular systolic function. No segmental  wall motion abnormalities.  5. The right ventricle is not well visualized; grossly  normal right ventricular systolic function.  6. Estimated right ventricular systolic pressure equals 68  mm Hg, assuming right atrial pressure equals 10 mm Hg,  consistent with severe pulmonary hypertension.         PRINCIPAL PROCEDURE  Procedure: Transthoracic echo  Findings and Treatment: DIMENSIONS: ****PRE-DIURESIS***  Dimensions:     Normal Values:  LA:     3.5 cm    2.0 - 4.0 cm  Ao:     2.5 cm    2.0 - 3.8 cm  SEPTUM: 0.7 cm    0.6 - 1.2 cm  PWT:    0.8 cm    0.6 - 1.1 cm  LVIDd:  4.6 cm    3.0 - 5.6 cm  LVIDs:  3.0 cm    1.8 - 4.0 cm  Derived Variables:  LVMI: 68 g/m2  RWT: 0.34  Fractional short: 35 %  Ejection Fraction (Modified Macedo Rule): 64 %  ------------------------------------------------------------------------  OBSERVATIONS:  Mitral Valve: Normal mitral valve. Mild mitral  regurgitation.  Aortic Root: Normal aortic root.  Aortic Valve: Normal trileaflet aortic valve.  Left Atrium: Mildly dilated left atrium.  LA volume index =  36 cc/m2.  Left Ventricle: Normal left ventricular systolic function.  No segmental wall motion abnormalities. Normal left  ventricular internal dimensions and wall thicknesses.  Right Heart: Normal right atrium. The right ventricle is  notwell visualized; grossly normal right ventricular  systolic function. Normal tricuspid valve.  Mild-moderate  tricuspid regurgitation. Normal pulmonic valve. Mild  pulmonic regurgitation.  Pericardium/PleuraSmall pericardial effusion posterior to  the left ventricle.  Hemodynamic: Estimated right ventricular systolic pressure  equals 68 mm Hg, assuming right atrial pressure equals 10  mm Hg, consistent with severe pulmonary hypertension.  ------------------------------------------------------------------------  CONCLUSIONS:  1. Normal mitral valve. Mild mitral regurgitation.  2. Mildly dilated left atrium.  LA volume index = 36 cc/m2.  3. Normal left ventricular internal dimensions and wall  thicknesses.  4. Normal left ventricular systolic function. No segmental  wall motion abnormalities.  5. The right ventricle is not well visualized; grossly  normal right ventricular systolic function.  6. Estimated right ventricular systolic pressure equals 68  mm Hg, assuming right atrial pressure equals 10 mm Hg,  consistent with severe pulmonary hypertension.        SECONDARY PROCEDURE  Procedure: Transthoracic echo  Findings and Treatment: OBSERVATIONS: *** POST DIURESIS***  Mitral Valve: Normal mitral valve.  Aortic Root: Normal aortic root.  Aortic Valve: Normal trileaflet aortic valve.  Left Atrium: Normal left atrium.  Right Heart: Normal right atrium. The right ventricle is  not well visualized; grossly normal right ventricular  systolic function. Normal tricuspid valve.  Mild tricuspid  regurgitation. Normal pulmonic valve. Mild pulmonic  regurgitation.  Pericardium/PleuraSmall pericardial effusion. Right pleural  effusion.  Hemodynamic: Estimated right ventricular systolic pressure  equals 58 mm Hg, assuming right atrial pressure equals 10  mm Hg, consistent with moderate pulmonary hypertension.  ------------------------------------------------------------------------  CONCLUSIONS:  1. Normal trileaflet aortic valve.  2. The right ventricle is not well visualized; grossly  normal right ventricular systolic function.  3. Estimated pulmonary artery systolic pressure equals 58  mm Hg, assuming right atrial pressure equals 10  mm Hg,  consistent with moderate pulmonary hypertension.  4. Right pleural effusion.

## 2022-10-06 NOTE — DISCHARGE NOTE PROVIDER - NSDCFUSCHEDAPPT_GEN_ALL_CORE_FT
Solo Yeager  [facility] Physician [facility]  UROLOGY [id number]  Scheduled Appointment: 10/18/2022

## 2022-10-06 NOTE — DISCHARGE NOTE PROVIDER - PROVIDER TOKENS
PROVIDER:[TOKEN:[[id number]],FOLLOWUP:[1 week]] PROVIDER:[TOKEN:[[id number]],FOLLOWUP:[1 week]],PROVIDER:[TOKEN:[[id number]],FOLLOWUP:[1 week]],PROVIDER:[TOKEN:[[id number]],FOLLOWUP:[2 weeks]]

## 2022-10-06 NOTE — PROGRESS NOTE ADULT - PROBLEM SELECTOR PLAN 2
Acute decompensated heart failure.   On admission, probnp. Echo: EF: 64%, elevated right sided pressures consistent with severe pulmonary hypertension, but no LV dysfunction   - diurese with 80mg IV lasix today  - Cards: repeat Echo when euvolemic to reassess pulmonary hypertension as patient is fluid overloaded   - Strict I's and O's  - Goal of Mg >2, K >4. Acute decompensated heart failure.   On admission, probnp. Echo: EF: 64%, elevated right sided pressures consistent with severe pulmonary hypertension, but no LV dysfunction   - diurese with 80mg IV lasix today and 2.5mg metolazone  - repeat TTE tomorrow  - Cards: repeat Echo when euvolemic to reassess pulmonary hypertension as patient is fluid overloaded   - Strict I's and O's  - Goal of Mg >2, K >4.

## 2022-10-06 NOTE — PROGRESS NOTE ADULT - ATTENDING COMMENTS
57 y.o. F w/ a hx of HTN, DM2, hypothyroidism hospitalized for ADHF 2/2 HTN emergency.     Patient feels well, breathing improved. PE: 2+ LE edema. Labs: Cr 1.99     # HTN emergency c/b ADHF: BP improved. Lower clonidine to .1mg QD, cont home meds. Secondary HTN workup negative. Will continue IV diuresis. Repeat TTE when euvolemic- likely tomorrow.   # Elevated Cr: Baseline 1.3 per OSH records, will obtain remainder of records. Cr stabalized- may represent true Cr and CKD rather than ISAMAR.   # DM2: Cont Lantus 16 units + Lispro 4 w/ meals+ SSI  # Transaminitis: Suspect hepatic congestion, improving with diuresis, continue.   # Hypothyroidism: TSH elevated 8.63 but improved since last hospitalization. Patient was taking synthroid shortly following it with Pantoprazole. Suspicion of poor absorption. Cont Synthroid, re-check TSH as an OP. -  # Microcytic anemia: Cont iron repletion. Follow up OP GI for colonoscopy.

## 2022-10-06 NOTE — DISCHARGE NOTE PROVIDER - NSDCCPCAREPLAN_GEN_ALL_CORE_FT
PRINCIPAL DISCHARGE DIAGNOSIS  Diagnosis: Hypertensive emergency  Assessment and Plan of Treatment: You were admitted to the hospital because your blood pressures were very elevated in the 200s. Your medications were slowly adjusted and your blood pressures became more controlled. You were discharged on amlodipine 10mg, hydralazine 100mg three times a day, labetalol 50mg twice a day. Since you had a lot of fluid on you, your were given a water pill to help you urinate more. Please continue Torsemide 40mg daily. Please check blood pressure at home. Call pcp or return to ED if Blood pressure remains extremely elevated, chest pain, shortness of breath, or worsening fluid on your lungs.      SECONDARY DISCHARGE DIAGNOSES  Diagnosis: SOB (shortness of breath)  Assessment and Plan of Treatment: You were initially short of breath due to fluid on your lungs. You were given medications to help you urinate more. Please continue torsemide 40mg daily on discharge. Echocardiogram showed preserved ejection fraction, so you were diagnosed with heart failure with preserved ejection fraction. Please follow up with cardiologist next week.    Diagnosis: Type 2 diabetes mellitus with hyperglycemia  Assessment and Plan of Treatment: Your blood glucose was monitored in the hospital. You were on lantus 16U and 6U admelog three times a day . When you go home, resume taking levemir 10U at bedtime and take repaglinide 0.5mg three times a day.    Diagnosis: Chronic kidney disease, unspecified CKD stage  Assessment and Plan of Treatment: Your creatinine level was elevated in the hospital. This is likely due to chronic kidney disease in setting of elevated blood pressures and diabetes. Please take medications as prescribed and follow with PCP and nephrology. Creatinine at time of discharge was stable 1.9-2.0 even with heavy diuresis.     PRINCIPAL DISCHARGE DIAGNOSIS  Diagnosis: Hypertensive emergency  Assessment and Plan of Treatment: You were admitted to the hospital because your blood pressures were very elevated in the 200s. Your medications were slowly adjusted and your blood pressures became more controlled. You were discharged on amlodipine 10mg, hydralazine 100mg three times a day, labetalol 50mg twice a day. Since you had a lot of fluid on you, your were given a water pill to help you urinate more. Please continue Torsemide 40mg daily. Please check blood pressure at home. Call pcp or return to ED if Blood pressure remains extremely elevated, chest pain, shortness of breath, or worsening fluid on your lungs.      SECONDARY DISCHARGE DIAGNOSES  Diagnosis: SOB (shortness of breath)  Assessment and Plan of Treatment: You were initially short of breath due to fluid on your lungs. You were given medications to help you urinate more. Please continue torsemide 40mg daily on discharge. Echocardiogram showed preserved ejection fraction, so you were diagnosed with heart failure with preserved ejection fraction. Please follow up with cardiologist next week.    Diagnosis: Type 2 diabetes mellitus with hyperglycemia  Assessment and Plan of Treatment: Your blood glucose was monitored in the hospital. You were on lantus 16U and 6U admelog three times a day . When you go home, resume taking levemir 10U at bedtime and take repaglinide 0.5mg three times a day. STOP metformin until you see your PCP.    Diagnosis: Chronic kidney disease, unspecified CKD stage  Assessment and Plan of Treatment: Your creatinine level was elevated in the hospital. This is likely due to chronic kidney disease in setting of elevated blood pressures and diabetes. Please take medications as prescribed and follow with PCP and nephrology. Creatinine at time of discharge was stable 1.9-2.0 even with heavy diuresis.     PRINCIPAL DISCHARGE DIAGNOSIS  Diagnosis: Hypertensive emergency  Assessment and Plan of Treatment: You were admitted to the hospital because your blood pressures were very elevated in the 200s. Your medications were slowly adjusted and your blood pressures became more controlled. You were discharged on amlodipine 10mg, hydralazine 100mg three times a day, labetalol 50mg twice a day. Since you had a lot of fluid on you, your were given a water pill to help you urinate more. Please continue Torsemide 40mg daily. Please check blood pressure at home. Call pcp or return to ED if Blood pressure remains extremely elevated, chest pain, shortness of breath, or worsening fluid on your lungs.      SECONDARY DISCHARGE DIAGNOSES  Diagnosis: SOB (shortness of breath)  Assessment and Plan of Treatment: You were initially short of breath due to fluid on your lungs. You were given medications to help you urinate more. Please continue torsemide 40mg daily on discharge. Echocardiogram showed preserved ejection fraction, so you were diagnosed with heart failure with preserved ejection fraction. Please follow up with cardiologist next week.    Diagnosis: Type 2 diabetes mellitus with hyperglycemia  Assessment and Plan of Treatment: Your blood glucose was monitored in the hospital. You were on lantus 16U and 6U admelog three times a day . When you go home, resume taking levemir 18U at bedtime and take repaglinide 0.5mg three times a day. STOP metformin until you see your PCP. Please follow with your PCP to increase the dosing of your levemir insulin. Please check fingersticks in the morning. If you notice sweating, fatigue, dizziness, light-headedness, please check fingerstick and call your PCP. Also, drink juice or eat a snack if sugar is low.    Diagnosis: Chronic kidney disease, unspecified CKD stage  Assessment and Plan of Treatment: Your creatinine level was elevated in the hospital. This is likely due to chronic kidney disease in setting of elevated blood pressures and diabetes. Please take medications as prescribed and follow with PCP and nephrology. Creatinine at time of discharge was stable 1.9-2.0 even with heavy diuresis.

## 2022-10-06 NOTE — PROGRESS NOTE ADULT - PROBLEM SELECTOR PLAN 1
Patient with resistant HTN of unclear etiology. Found to have severe pulmonary hypertension on Echo. Likely uncontrolled primary HTN in setting of medication non-compliance.     - goal 120s-150s systolic    - c/w amlodipine 10mg qd, labetalol 50mg BID, clonidine 0.1mg daily, hydral 100mg TID  - plan to wean off clonidine as per Cardio recs-- wean clonidine 0.1mg  daily  - VA Duplex Abdomen/Pelvis w/ no significant renal stenosis  - TSH elevated to 8.63, Free T4 WNL  - renin activity WNL (recent renin/reji at [facility] WNL)  - urine/plasma metanephrines WNL Patient with resistant HTN of unclear etiology. Found to have severe pulmonary hypertension on Echo. Likely uncontrolled primary HTN in setting of medication non-compliance.     - goal 120s-150s systolic    - c/w amlodipine 10mg qd, labetalol 50mg BID, clonidine 0.1mg daily, hydral 100mg TID  - plan to wean off clonidine as per Cardio recs-- wean clonidine 0.1mg off tomorrow  - VA Duplex Abdomen/Pelvis w/ no significant renal stenosis  - TSH elevated to 8.63, Free T4 WNL  - renin activity WNL (recent renin/reji at [facility] WNL)  - urine/plasma metanephrines WNL

## 2022-10-06 NOTE — DISCHARGE NOTE PROVIDER - CARE PROVIDER_API CALL
[facility]  [address] [place] [id number]  Phone: [phone]  Fax: [phone]  Follow Up Time: 1 week   [facility]  [address] [place] [address]  Phone: [phone]  Fax: [phone]  Follow Up Time: 1 week    Gautam Ordonez  CARDIOVASCULAR DISEASE  [address] [place] [address]  Phone: [phone]  Fax: [phone]  Follow Up Time: 1 week    Jonel Coyle)  Nephrology  [address] [facility] [facility]nd [place] [address]  Phone: [phone]  Fax: [phone]  Follow Up Time: 2 weeks

## 2022-10-06 NOTE — PROGRESS NOTE ADULT - PROBLEM SELECTOR PLAN 3
likely sequelae 2/2 to hypertensive emergency. Cannot r/o underlying CKD  - unknown baseline, elevated at last hospitalization  - FeUREA 29%-> indicative of pre-renal ISAMAR, but given stable creatinine with continued diuresis, may be in setting of CKD  - per [facility] records Creatinine 1.83  9/16/22, but baseline 1.2-1.3  - VA Duplex Abdomen/Pelvis w/o concern for renal artery stenosis  - Urine Na and Urine Cr WNL  - U/A unremarkable   - CTM Bun/Cr.  - nephrology consulted, appreciate recs  - avoid nephrotoxic drugs, dose medications renally

## 2022-10-07 ENCOUNTER — TRANSCRIPTION ENCOUNTER (OUTPATIENT)
Age: 57
End: 2022-10-07

## 2022-10-07 VITALS
SYSTOLIC BLOOD PRESSURE: 123 MMHG | DIASTOLIC BLOOD PRESSURE: 57 MMHG | HEART RATE: 65 BPM | RESPIRATION RATE: 18 BRPM | OXYGEN SATURATION: 98 % | TEMPERATURE: 98 F

## 2022-10-07 LAB
ALBUMIN SERPL ELPH-MCNC: 3.9 G/DL — SIGNIFICANT CHANGE UP (ref 3.3–5)
ALP SERPL-CCNC: 260 U/L — HIGH (ref 40–120)
ALT FLD-CCNC: 50 U/L — HIGH (ref 4–33)
ANION GAP SERPL CALC-SCNC: 14 MMOL/L — SIGNIFICANT CHANGE UP (ref 7–14)
AST SERPL-CCNC: 27 U/L — SIGNIFICANT CHANGE UP (ref 4–32)
BILIRUB SERPL-MCNC: 0.3 MG/DL — SIGNIFICANT CHANGE UP (ref 0.2–1.2)
BUN SERPL-MCNC: 92 MG/DL — HIGH (ref 7–23)
CALCIUM SERPL-MCNC: 9.1 MG/DL — SIGNIFICANT CHANGE UP (ref 8.4–10.5)
CHLORIDE SERPL-SCNC: 94 MMOL/L — LOW (ref 98–107)
CO2 SERPL-SCNC: 24 MMOL/L — SIGNIFICANT CHANGE UP (ref 22–31)
CREAT SERPL-MCNC: 2.05 MG/DL — HIGH (ref 0.5–1.3)
EGFR: 28 ML/MIN/1.73M2 — LOW
GLUCOSE BLDC GLUCOMTR-MCNC: 200 MG/DL — HIGH (ref 70–99)
GLUCOSE BLDC GLUCOMTR-MCNC: 303 MG/DL — HIGH (ref 70–99)
GLUCOSE SERPL-MCNC: 147 MG/DL — HIGH (ref 70–99)
HCT VFR BLD CALC: 23.9 % — LOW (ref 34.5–45)
HGB BLD-MCNC: 7.7 G/DL — LOW (ref 11.5–15.5)
MAGNESIUM SERPL-MCNC: 2.2 MG/DL — SIGNIFICANT CHANGE UP (ref 1.6–2.6)
MCHC RBC-ENTMCNC: 24.8 PG — LOW (ref 27–34)
MCHC RBC-ENTMCNC: 32.2 GM/DL — SIGNIFICANT CHANGE UP (ref 32–36)
MCV RBC AUTO: 77.1 FL — LOW (ref 80–100)
NRBC # BLD: 0 /100 WBCS — SIGNIFICANT CHANGE UP (ref 0–0)
NRBC # FLD: 0 K/UL — SIGNIFICANT CHANGE UP (ref 0–0)
PHOSPHATE SERPL-MCNC: 4.4 MG/DL — SIGNIFICANT CHANGE UP (ref 2.5–4.5)
PLATELET # BLD AUTO: 197 K/UL — SIGNIFICANT CHANGE UP (ref 150–400)
POTASSIUM SERPL-MCNC: 4.5 MMOL/L — SIGNIFICANT CHANGE UP (ref 3.5–5.3)
POTASSIUM SERPL-SCNC: 4.5 MMOL/L — SIGNIFICANT CHANGE UP (ref 3.5–5.3)
PROT SERPL-MCNC: 6.4 G/DL — SIGNIFICANT CHANGE UP (ref 6–8.3)
RBC # BLD: 3.1 M/UL — LOW (ref 3.8–5.2)
RBC # FLD: 17.4 % — HIGH (ref 10.3–14.5)
SODIUM SERPL-SCNC: 132 MMOL/L — LOW (ref 135–145)
WBC # BLD: 8.25 K/UL — SIGNIFICANT CHANGE UP (ref 3.8–10.5)
WBC # FLD AUTO: 8.25 K/UL — SIGNIFICANT CHANGE UP (ref 3.8–10.5)

## 2022-10-07 PROCEDURE — 99239 HOSP IP/OBS DSCHRG MGMT >30: CPT | Mod: GC

## 2022-10-07 PROCEDURE — 93306 TTE W/DOPPLER COMPLETE: CPT | Mod: 26

## 2022-10-07 RX ORDER — HYDRALAZINE HCL 50 MG
1 TABLET ORAL
Qty: 90 | Refills: 0
Start: 2022-10-07 | End: 2022-11-05

## 2022-10-07 RX ORDER — HYDRALAZINE HCL 50 MG
1 TABLET ORAL
Qty: 0 | Refills: 0 | DISCHARGE

## 2022-10-07 RX ORDER — METFORMIN HYDROCHLORIDE 850 MG/1
1 TABLET ORAL
Qty: 0 | Refills: 0 | DISCHARGE

## 2022-10-07 RX ORDER — INSULIN DETEMIR 100/ML (3)
10 INSULIN PEN (ML) SUBCUTANEOUS
Qty: 3 | Refills: 0
Start: 2022-10-07 | End: 2022-11-05

## 2022-10-07 RX ORDER — INSULIN DETEMIR 100/ML (3)
10 INSULIN PEN (ML) SUBCUTANEOUS
Qty: 0 | Refills: 0 | DISCHARGE

## 2022-10-07 RX ORDER — INSULIN DETEMIR 100/ML (3)
18 INSULIN PEN (ML) SUBCUTANEOUS
Qty: 5 | Refills: 0
Start: 2022-10-07 | End: 2022-11-05

## 2022-10-07 RX ADMIN — Medication 6 UNIT(S): at 09:03

## 2022-10-07 RX ADMIN — Medication 4: at 12:46

## 2022-10-07 RX ADMIN — Medication 100 MILLIGRAM(S): at 05:24

## 2022-10-07 RX ADMIN — PANTOPRAZOLE SODIUM 40 MILLIGRAM(S): 20 TABLET, DELAYED RELEASE ORAL at 09:04

## 2022-10-07 RX ADMIN — Medication 50 MILLIGRAM(S): at 05:24

## 2022-10-07 RX ADMIN — Medication 40 MILLIGRAM(S): at 05:25

## 2022-10-07 RX ADMIN — Medication 1: at 09:02

## 2022-10-07 RX ADMIN — Medication 50 MICROGRAM(S): at 05:24

## 2022-10-07 RX ADMIN — HEPARIN SODIUM 5000 UNIT(S): 5000 INJECTION INTRAVENOUS; SUBCUTANEOUS at 05:25

## 2022-10-07 RX ADMIN — INSULIN GLARGINE 16 UNIT(S): 100 INJECTION, SOLUTION SUBCUTANEOUS at 09:02

## 2022-10-07 RX ADMIN — AMLODIPINE BESYLATE 10 MILLIGRAM(S): 2.5 TABLET ORAL at 05:24

## 2022-10-07 RX ADMIN — Medication 6 UNIT(S): at 12:45

## 2022-10-07 NOTE — PROGRESS NOTE ADULT - SUBJECTIVE AND OBJECTIVE BOX
Walter Kincaid, PGY2    DATE OF SERVICE: 10-07-22 @ 07:15    Patient is a 57y old  Female who presents with a chief complaint of shortness of breath (06 Oct 2022 16:48)      SUBJECTIVE / OVERNIGHT EVENTS: No acute events overnight. Recorded net negative 500cc yesterday. Patient seen and examined this AM. Patient wanting to go home. Otherwise doing well. Feels like her leg swelling is improving. Denies chest pain, dyspnea, abd pain, n/v.     MEDICATIONS  (STANDING):  amLODIPine   Tablet 10 milliGRAM(s) Oral daily  atorvastatin 40 milliGRAM(s) Oral at bedtime  dextrose 5%. 1000 milliLiter(s) (50 mL/Hr) IV Continuous <Continuous>  dextrose 5%. 1000 milliLiter(s) (100 mL/Hr) IV Continuous <Continuous>  dextrose 50% Injectable 25 Gram(s) IV Push once  dextrose 50% Injectable 12.5 Gram(s) IV Push once  dextrose 50% Injectable 25 Gram(s) IV Push once  glucagon  Injectable 1 milliGRAM(s) IntraMuscular once  heparin   Injectable 5000 Unit(s) SubCutaneous every 12 hours  hydrALAZINE 100 milliGRAM(s) Oral three times a day  influenza   Vaccine 0.5 milliLiter(s) IntraMuscular once  insulin glargine Injectable (LANTUS) 16 Unit(s) SubCutaneous every morning  insulin lispro (ADMELOG) corrective regimen sliding scale   SubCutaneous three times a day before meals  insulin lispro (ADMELOG) corrective regimen sliding scale   SubCutaneous at bedtime  insulin lispro Injectable (ADMELOG) 6 Unit(s) SubCutaneous three times a day before meals  labetalol 50 milliGRAM(s) Oral two times a day  levothyroxine 50 MICROGram(s) Oral daily  pantoprazole    Tablet 40 milliGRAM(s) Oral before breakfast  torsemide 40 milliGRAM(s) Oral daily    MEDICATIONS  (PRN):  acetaminophen     Tablet .. 650 milliGRAM(s) Oral every 6 hours PRN Temp greater or equal to 38C (100.4F), Mild Pain (1 - 3), Moderate Pain (4 - 6)  dextrose Oral Gel 15 Gram(s) Oral once PRN Blood Glucose LESS THAN 70 milliGRAM(s)/deciliter      Vital Signs Last 24 Hrs  T(C): 36.6 (07 Oct 2022 05:20), Max: 36.7 (06 Oct 2022 21:20)  T(F): 97.8 (07 Oct 2022 05:20), Max: 98.1 (06 Oct 2022 21:20)  HR: 67 (07 Oct 2022 05:20) (60 - 68)  BP: 138/60 (07 Oct 2022 05:20) (111/88 - 140/47)  BP(mean): --  RR: 18 (07 Oct 2022 05:20) (16 - 18)  SpO2: 100% (07 Oct 2022 05:20) (100% - 100%)    Parameters below as of 07 Oct 2022 05:20  Patient On (Oxygen Delivery Method): room air      CAPILLARY BLOOD GLUCOSE      POCT Blood Glucose.: 164 mg/dL (06 Oct 2022 22:51)  POCT Blood Glucose.: 168 mg/dL (06 Oct 2022 17:45)  POCT Blood Glucose.: 243 mg/dL (06 Oct 2022 13:04)  POCT Blood Glucose.: 113 mg/dL (06 Oct 2022 08:32)    I&O's Summary    06 Oct 2022 07:01  -  07 Oct 2022 07:00  --------------------------------------------------------  IN: 1350 mL / OUT: 1850 mL / NET: -500 mL        PHYSICAL EXAM:  CONSTITUTIONAL: NAD, well-developed  RESPIRATORY: Normal respiratory effort; lungs are clear to auscultation bilaterally  CARDIOVASCULAR: Regular rate and rhythm, normal S1 and S2, no murmur/rub/gallop; trace-1+lower extremity edema; Peripheral pulses are 2+ bilaterally  ABDOMEN: Nontender to palpation, normoactive bowel sounds, no rebound/guarding; No hepatosplenomegaly  MUSCULOSKELETAL: BL LE swelling 1+ edema (improving), Right upper extremity swelling, full range of motion and sensation in right arm- improving  PSYCH: A+O to person, place, and time; affect appropriate    LABS:                        8.1    8.89  )-----------( 199      ( 06 Oct 2022 06:13 )             25.6     10-06    134<L>  |  98  |  86<H>  ----------------------------<  90  4.2   |  22  |  1.99<H>    Ca    9.4      06 Oct 2022 06:13  Phos  4.8     10-06  Mg     2.30     10-06    TPro  6.7  /  Alb  3.7  /  TBili  0.4  /  DBili  x   /  AST  31  /  ALT  57<H>  /  AlkPhos  272<H>  10-06              RADIOLOGY & ADDITIONAL TESTS:    Imaging Personally Reviewed:    Consultant(s) Notes Reviewed:      Care Discussed with Consultants/Other Providers:

## 2022-10-07 NOTE — PROGRESS NOTE ADULT - PROVIDER SPECIALTY LIST ADULT
Cardiology
Internal Medicine
Cardiology
Cardiology
Nephrology
Internal Medicine
Nephrology
Internal Medicine

## 2022-10-07 NOTE — PROGRESS NOTE ADULT - ATTENDING COMMENTS
57 y.o. F w/ a hx of HTN, DM2, hypothyroidism hospitalized for ADHF 2/2 HTN emergency.     Patient feels well, breathing improved. PE: 2+ LE edema. Labs: Cr 2.05    # HTN emergency c/b ADHF: BP improved. Clonidine discontinued. Cont home hydralazine, Labetalol amlodipine. Secondary HTN workup negative. Can d/c on Torsemide. Repeat TTE with improvement in PASP. OP Cardiology follow up scheduled for Wednesday.   # Elevated Cr: Baseline 1.3 per OSH records, have been unable to obtain remainder of records. Cr stabilized- may represent true Cr and CKD rather than ISAMAR.   # DM2: Cont Lantus 16 units + Lispro 4 w/ meals+ SSI  # Transaminitis: Suspect hepatic congestion, improving with diuresis, continue.   # Hypothyroidism: TSH elevated 8.63 but improved since last hospitalization. Patient was taking synthroid shortly following it with Pantoprazole. Suspicion of poor absorption. Cont Synthroid, re-check TSH as an OP. -  # Microcytic anemia: Cont iron repletion. Follow up OP GI for colonoscopy.    D/C home today. 32 minutes spent in discharge planning. Stressed importance of OP follow up for BP monitoring, diuresis, pulm HTN management, CKD monitoring and titration of Insulin and synthroid. Cardiology appointment made for Wednesday. Pt agreeable to medication regimen. 57 y.o. F w/ a hx of HTN, DM2, hypothyroidism hospitalized for ADHF 2/2 HTN emergency.     Patient feels well, breathing improved. PE: 2+ LE edema. Labs: Cr 2.05    # HTN emergency c/b ADHF: BP improved. Clonidine discontinued. Cont home hydralazine, Labetalol amlodipine. Secondary HTN workup negative. Can d/c on Torsemide. Repeat TTE with improvement in PASP. OP Cardiology follow up scheduled for Wednesday.   # Elevated Cr: Baseline 1.3 per OSH records, have been unable to obtain remainder of records. Cr stabilized- may represent true Cr and CKD rather than ISAMAR.   # DM2: Cont Lantus 16 units + Lispro 4 w/ meals+ SSI. D/C Metformin on d/c given CrCl  # Transaminitis: Suspect hepatic congestion, improving with diuresis, continue.   # Hypothyroidism: TSH elevated 8.63 but improved since last hospitalization. Patient was taking synthroid shortly following it with Pantoprazole. Suspicion of poor absorption. Cont Synthroid, re-check TSH as an OP.   # Microcytic anemia: Cont iron repletion. Follow up OP GI for colonoscopy.    D/C home today. 32 minutes spent in discharge planning. Stressed importance of OP follow up for BP monitoring, diuresis, pulm HTN management, CKD monitoring and titration of Insulin and synthroid. Cardiology appointment made for Wednesday. Pt agreeable to medication regimen.

## 2022-10-07 NOTE — DISCHARGE NOTE NURSING/CASE MANAGEMENT/SOCIAL WORK - PATIENT PORTAL LINK FT
You can access the FollowMyHealth Patient Portal offered by [facility] by registering at the following website: [email or web address]. By joining [employer]s FollowMyHealth portal, you will also be able to view your health information using other applications (apps) compatible with our system.

## 2022-10-07 NOTE — PROGRESS NOTE ADULT - PROBLEM SELECTOR PROBLEM 2
Acute decompensated heart failure

## 2022-10-07 NOTE — PROGRESS NOTE ADULT - PROBLEM SELECTOR PLAN 1
Patient with resistant HTN of unclear etiology. Found to have severe pulmonary hypertension on Echo. Likely uncontrolled primary HTN in setting of medication non-compliance.     - goal 120s-150s systolic    - c/w amlodipine 10mg qd, labetalol 50mg BID, clonidine 0.1mg daily, hydral 100mg TID  - plan to wean off clonidine as per Cardio recs-- wean clonidine 0.1mg off tomorrow  - VA Duplex Abdomen/Pelvis w/ no significant renal stenosis  - TSH elevated to 8.63, Free T4 WNL  - renin activity WNL (recent renin/reji at [facility] WNL)  - urine/plasma metanephrines WNL

## 2022-10-07 NOTE — PHYSICAL THERAPY INITIAL EVALUATION ADULT - PERTINENT HX OF CURRENT PROBLEM, REHAB EVAL
58yo F with PMH of HTN, HLD, DM2, anemia presents to ED for progressive and severe resp distress. the patient was recently discharged on 9/23 from [facility] after one week for HTN emergency with pulm edema and chest pain. Her hospital course was complicated by ISAMAR and hyponatremia (resolving toward dc w/ bp control), hyperkalemia w/ peaked T waves on EKG (requiring calc gluconate, insulin, lokelma) and hyperglycemic to 400s.

## 2022-10-07 NOTE — PHARMACOTHERAPY INTERVENTION NOTE - NSPHARMCOMMPTEDU
Patient Education - Other
Patient Education - Discharge Counseling
Patient Education - Discharge Counseling

## 2022-10-07 NOTE — PHARMACOTHERAPY INTERVENTION NOTE - COMMENTS
Discharge medications reviewed with the patient. Outpatient medication schedule was discussed in detail including: medication name, indication, dose, administration times, treatment duration, side effects, drug interactions, and special instructions. Patient questions and concerns were answered and addressed. Patient demonstrated understanding. Informed patient that medication list may change prior to discharge. Patient provided with a medication schedule but needs some adjustments before leaving to make it easier for pt to understand. Will revisit patient tomorrow with finalized medication schedule with BP meds on one sheet and all other meds on another as per pt request.    Milind Swift, PharmD  Clinical Pharmacy Specialist  [id number]  
Extensively discussed with patient the complications of longstanding hypertension and stressed the importance of adherence to medication regimen. Patient stated she takes medication exactly as its stated on the bottle, but can't recall how she takes them. When asked how she remembers to take her medication, she states she just remembers and takes it. Discussed the use of pill boxes and phone reminders to assist. Also discussed creating a calendar for patient that states what medications she can take together in the AM, afternoon, PM. Patient felt that would provide the most benefit to her and agreed to start using her pill box when she gets home based on this as well. If patient is still here next week, will do further education on HTN regimen for discharge when finalized.    Sharyn Malone, PharmD, AAHIVP  Clinical Pharmacy Specialist  Spectra: [phone]  
Discharge medications reviewed with the patient. Outpatient medication schedule was discussed in detail including: medication name, indication, dose, administration times, treatment duration and special instructions. Provided patient with updated medication schedule and helped patient set up medication reminders on her phone. She was also able to correctly demonstrate how she uses her Levemir at home with a demo pen. Also discussed A1c and the importance of diabetes control and the complications of uncontrolled diabetes. Patient questions and concerns were answered and addressed. Patient demonstrated understanding.    Kaitlin Ross, PharmD, Clinical Pharmacy Specialist, [phone]

## 2022-10-07 NOTE — PHYSICAL THERAPY INITIAL EVALUATION ADULT - ADDITIONAL COMMENTS
Pt lives in a home 5 steps to enter and staircase inside with family.  No history of falls.  Fully independent prior to admission.

## 2022-10-07 NOTE — PROGRESS NOTE ADULT - PROBLEM SELECTOR PROBLEM 1
Hypertensive emergency
ISAMAR (acute kidney injury)
Hypertensive emergency
Hypertensive emergency
ISAMAR (acute kidney injury)
Hypertensive emergency

## 2022-10-07 NOTE — PHYSICAL THERAPY INITIAL EVALUATION ADULT - MANUAL MUSCLE TESTING RESULTS, REHAB EVAL
Upper and lower extremities grossly assessed.  Bilaterally, pt presents with a 5/5 on MMT score./grossly assessed due to

## 2022-10-07 NOTE — PROGRESS NOTE ADULT - PROBLEM SELECTOR PLAN 2
Acute decompensated heart failure.   On admission, probnp. Echo: EF: 64%, elevated right sided pressures consistent with severe pulmonary hypertension, but no LV dysfunction   - transition to PO torsemide 40mg today  - repeat TTE today  - Cards: repeat Echo when euvolemic to reassess pulmonary hypertension as patient is fluid overloaded   - Strict I's and O's  - Goal of Mg >2, K >4.

## 2022-10-07 NOTE — PROGRESS NOTE ADULT - PROBLEM SELECTOR PLAN 3
likely sequelae 2/2 to hypertensive emergency. Cannot r/o underlying CKD  - unknown baseline, elevated at last hospitalization  - FeUREA 29%-> indicative of pre-renal ISAMAR, but given stable creatinine with continued diuresis, may be in setting of CKD  - per [facility] records Creatinine 1.83  9/16/22, but baseline 1.2-1.3  - VA Duplex Abdomen/Pelvis w/o concern for renal artery stenosis  - Urine Na and Urine Cr WNL  - U/A unremarkable   - CTM Bun/Cr.  - nephrology consulted, appreciate recs  - avoid nephrotoxic drugs, dose medications renally

## 2022-10-18 ENCOUNTER — APPOINTMENT (OUTPATIENT)
Dept: UROLOGY | Facility: CLINIC | Age: 57
End: 2022-10-18

## 2022-10-31 PROBLEM — E78.5 HYPERLIPIDEMIA, UNSPECIFIED: Chronic | Status: ACTIVE | Noted: 2022-09-28

## 2022-10-31 PROBLEM — I10 ESSENTIAL (PRIMARY) HYPERTENSION: Chronic | Status: ACTIVE | Noted: 2022-09-28

## 2022-10-31 PROBLEM — E11.9 TYPE 2 DIABETES MELLITUS WITHOUT COMPLICATIONS: Chronic | Status: ACTIVE | Noted: 2022-09-28

## 2022-11-25 ENCOUNTER — INPATIENT (INPATIENT)
Facility: HOSPITAL | Age: 57
LOS: 3 days | Discharge: HOME HEALTH | End: 2022-11-29
Attending: STUDENT IN AN ORGANIZED HEALTH CARE EDUCATION/TRAINING PROGRAM | Admitting: STUDENT IN AN ORGANIZED HEALTH CARE EDUCATION/TRAINING PROGRAM

## 2022-11-25 VITALS
RESPIRATION RATE: 22 BRPM | OXYGEN SATURATION: 100 % | SYSTOLIC BLOOD PRESSURE: 116 MMHG | HEART RATE: 70 BPM | DIASTOLIC BLOOD PRESSURE: 74 MMHG

## 2022-11-25 DIAGNOSIS — I10 ESSENTIAL (PRIMARY) HYPERTENSION: ICD-10-CM

## 2022-11-25 DIAGNOSIS — E11.65 TYPE 2 DIABETES MELLITUS WITH HYPERGLYCEMIA: ICD-10-CM

## 2022-11-25 DIAGNOSIS — E78.5 HYPERLIPIDEMIA, UNSPECIFIED: ICD-10-CM

## 2022-11-25 DIAGNOSIS — E03.9 HYPOTHYROIDISM, UNSPECIFIED: ICD-10-CM

## 2022-11-25 DIAGNOSIS — E87.5 HYPERKALEMIA: ICD-10-CM

## 2022-11-25 LAB
A1C WITH ESTIMATED AVERAGE GLUCOSE RESULT: 9.7 % — HIGH (ref 4–5.6)
ALBUMIN SERPL ELPH-MCNC: 3.5 G/DL — SIGNIFICANT CHANGE UP (ref 3.3–5)
ALBUMIN SERPL ELPH-MCNC: 3.8 G/DL — SIGNIFICANT CHANGE UP (ref 3.3–5)
ALBUMIN SERPL ELPH-MCNC: 3.9 G/DL — SIGNIFICANT CHANGE UP (ref 3.3–5)
ALP SERPL-CCNC: 385 U/L — HIGH (ref 40–120)
ALP SERPL-CCNC: 411 U/L — HIGH (ref 40–120)
ALP SERPL-CCNC: 434 U/L — HIGH (ref 40–120)
ALT FLD-CCNC: 112 U/L — HIGH (ref 4–33)
ALT FLD-CCNC: 129 U/L — HIGH (ref 4–33)
ALT FLD-CCNC: 130 U/L — HIGH (ref 4–33)
AMPHET UR-MCNC: NEGATIVE — SIGNIFICANT CHANGE UP
ANION GAP SERPL CALC-SCNC: 11 MMOL/L — SIGNIFICANT CHANGE UP (ref 7–14)
ANION GAP SERPL CALC-SCNC: 13 MMOL/L — SIGNIFICANT CHANGE UP (ref 7–14)
ANION GAP SERPL CALC-SCNC: 14 MMOL/L — SIGNIFICANT CHANGE UP (ref 7–14)
ANISOCYTOSIS BLD QL: SLIGHT — SIGNIFICANT CHANGE UP
APPEARANCE UR: ABNORMAL
APTT BLD: 39.6 SEC — HIGH (ref 27–36.3)
AST SERPL-CCNC: 83 U/L — HIGH (ref 4–32)
AST SERPL-CCNC: 85 U/L — HIGH (ref 4–32)
AST SERPL-CCNC: 89 U/L — HIGH (ref 4–32)
B-OH-BUTYR SERPL-SCNC: <0 MMOL/L — SIGNIFICANT CHANGE UP (ref 0–0.4)
BARBITURATES UR SCN-MCNC: NEGATIVE — SIGNIFICANT CHANGE UP
BASE EXCESS BLDV CALC-SCNC: -4.3 MMOL/L — LOW (ref -2–3)
BASE EXCESS BLDV CALC-SCNC: -7.3 MMOL/L — LOW (ref -2–3)
BASOPHILS # BLD AUTO: 0 K/UL — SIGNIFICANT CHANGE UP (ref 0–0.2)
BASOPHILS NFR BLD AUTO: 0 % — SIGNIFICANT CHANGE UP (ref 0–2)
BENZODIAZ UR-MCNC: POSITIVE
BILIRUB SERPL-MCNC: 0.5 MG/DL — SIGNIFICANT CHANGE UP (ref 0.2–1.2)
BILIRUB SERPL-MCNC: 0.6 MG/DL — SIGNIFICANT CHANGE UP (ref 0.2–1.2)
BILIRUB SERPL-MCNC: 0.6 MG/DL — SIGNIFICANT CHANGE UP (ref 0.2–1.2)
BILIRUB UR-MCNC: NEGATIVE — SIGNIFICANT CHANGE UP
BLD GP AB SCN SERPL QL: NEGATIVE — SIGNIFICANT CHANGE UP
BLOOD GAS ARTERIAL - LYTES,HGB,ICA,LACT RESULT: SIGNIFICANT CHANGE UP
BLOOD GAS VENOUS COMPREHENSIVE RESULT: SIGNIFICANT CHANGE UP
BLOOD GAS VENOUS COMPREHENSIVE RESULT: SIGNIFICANT CHANGE UP
BUN SERPL-MCNC: 97 MG/DL — HIGH (ref 7–23)
BUN SERPL-MCNC: 99 MG/DL — HIGH (ref 7–23)
BUN SERPL-MCNC: 99 MG/DL — HIGH (ref 7–23)
CALCIUM SERPL-MCNC: 10 MG/DL — SIGNIFICANT CHANGE UP (ref 8.4–10.5)
CALCIUM SERPL-MCNC: 8.8 MG/DL — SIGNIFICANT CHANGE UP (ref 8.4–10.5)
CALCIUM SERPL-MCNC: 9.1 MG/DL — SIGNIFICANT CHANGE UP (ref 8.4–10.5)
CHLORIDE BLDV-SCNC: 102 MMOL/L — SIGNIFICANT CHANGE UP (ref 96–108)
CHLORIDE BLDV-SCNC: 102 MMOL/L — SIGNIFICANT CHANGE UP (ref 96–108)
CHLORIDE SERPL-SCNC: 101 MMOL/L — SIGNIFICANT CHANGE UP (ref 98–107)
CHLORIDE SERPL-SCNC: 102 MMOL/L — SIGNIFICANT CHANGE UP (ref 98–107)
CHLORIDE SERPL-SCNC: 99 MMOL/L — SIGNIFICANT CHANGE UP (ref 98–107)
CHOLEST SERPL-MCNC: 107 MG/DL — SIGNIFICANT CHANGE UP
CK SERPL-CCNC: 150 U/L — SIGNIFICANT CHANGE UP (ref 25–170)
CO2 BLDV-SCNC: 20 MMOL/L — LOW (ref 22–26)
CO2 BLDV-SCNC: 23 MMOL/L — SIGNIFICANT CHANGE UP (ref 22–26)
CO2 SERPL-SCNC: 18 MMOL/L — LOW (ref 22–31)
CO2 SERPL-SCNC: 19 MMOL/L — LOW (ref 22–31)
CO2 SERPL-SCNC: 21 MMOL/L — LOW (ref 22–31)
COCAINE METAB.OTHER UR-MCNC: NEGATIVE — SIGNIFICANT CHANGE UP
COLOR SPEC: YELLOW — SIGNIFICANT CHANGE UP
CORTIS AM PEAK SERPL-MCNC: 26.3 UG/DL — HIGH (ref 6–18.4)
CREAT SERPL-MCNC: 2.15 MG/DL — HIGH (ref 0.5–1.3)
CREAT SERPL-MCNC: 2.16 MG/DL — HIGH (ref 0.5–1.3)
CREAT SERPL-MCNC: 2.19 MG/DL — HIGH (ref 0.5–1.3)
CREATININE URINE RESULT, DAU: 13 MG/DL — SIGNIFICANT CHANGE UP
DIFF PNL FLD: NEGATIVE — SIGNIFICANT CHANGE UP
EGFR: 26 ML/MIN/1.73M2 — LOW
EOSINOPHIL # BLD AUTO: 0.07 K/UL — SIGNIFICANT CHANGE UP (ref 0–0.5)
EOSINOPHIL NFR BLD AUTO: 1.8 % — SIGNIFICANT CHANGE UP (ref 0–6)
ESTIMATED AVERAGE GLUCOSE: 232 — SIGNIFICANT CHANGE UP
FLUAV AG NPH QL: SIGNIFICANT CHANGE UP
FLUBV AG NPH QL: SIGNIFICANT CHANGE UP
GAS PNL BLDV: 128 MMOL/L — LOW (ref 136–145)
GAS PNL BLDV: 132 MMOL/L — LOW (ref 136–145)
GAS PNL BLDV: SIGNIFICANT CHANGE UP
GIANT PLATELETS BLD QL SMEAR: PRESENT — SIGNIFICANT CHANGE UP
GLUCOSE BLDC GLUCOMTR-MCNC: 112 MG/DL — HIGH (ref 70–99)
GLUCOSE BLDC GLUCOMTR-MCNC: 148 MG/DL — HIGH (ref 70–99)
GLUCOSE BLDC GLUCOMTR-MCNC: 188 MG/DL — HIGH (ref 70–99)
GLUCOSE BLDC GLUCOMTR-MCNC: 209 MG/DL — HIGH (ref 70–99)
GLUCOSE BLDV-MCNC: 101 MG/DL — HIGH (ref 70–99)
GLUCOSE BLDV-MCNC: 389 MG/DL — HIGH (ref 70–99)
GLUCOSE SERPL-MCNC: 108 MG/DL — HIGH (ref 70–99)
GLUCOSE SERPL-MCNC: 141 MG/DL — HIGH (ref 70–99)
GLUCOSE SERPL-MCNC: 374 MG/DL — HIGH (ref 70–99)
GLUCOSE UR QL: ABNORMAL
HCG SERPL-ACNC: <5 MIU/ML — SIGNIFICANT CHANGE UP
HCO3 BLDV-SCNC: 19 MMOL/L — LOW (ref 22–29)
HCO3 BLDV-SCNC: 22 MMOL/L — SIGNIFICANT CHANGE UP (ref 22–29)
HCT VFR BLD CALC: 22.6 % — LOW (ref 34.5–45)
HCT VFR BLD CALC: 23.5 % — LOW (ref 34.5–45)
HCT VFR BLDA CALC: 22 % — LOW (ref 34.5–46.5)
HCT VFR BLDA CALC: 25 % — LOW (ref 34.5–46.5)
HDLC SERPL-MCNC: 61 MG/DL — SIGNIFICANT CHANGE UP
HGB BLD CALC-MCNC: 7.4 G/DL — LOW (ref 11.5–15.5)
HGB BLD CALC-MCNC: 8.3 G/DL — LOW (ref 11.5–15.5)
HGB BLD-MCNC: 7.1 G/DL — LOW (ref 11.5–15.5)
HGB BLD-MCNC: 7.4 G/DL — LOW (ref 11.5–15.5)
IANC: 2.84 K/UL — SIGNIFICANT CHANGE UP (ref 1.8–7.4)
INR BLD: 1.31 RATIO — HIGH (ref 0.88–1.16)
KETONES UR-MCNC: NEGATIVE — SIGNIFICANT CHANGE UP
LACTATE BLDV-MCNC: 0.7 MMOL/L — SIGNIFICANT CHANGE UP (ref 0.5–2)
LACTATE BLDV-MCNC: 1.4 MMOL/L — SIGNIFICANT CHANGE UP (ref 0.5–2)
LEUKOCYTE ESTERASE UR-ACNC: NEGATIVE — SIGNIFICANT CHANGE UP
LIPID PNL WITH DIRECT LDL SERPL: 40 MG/DL — SIGNIFICANT CHANGE UP
LYMPHOCYTES # BLD AUTO: 0.37 K/UL — LOW (ref 1–3.3)
LYMPHOCYTES # BLD AUTO: 9.6 % — LOW (ref 13–44)
MAGNESIUM SERPL-MCNC: 1.9 MG/DL — SIGNIFICANT CHANGE UP (ref 1.6–2.6)
MAGNESIUM SERPL-MCNC: 1.9 MG/DL — SIGNIFICANT CHANGE UP (ref 1.6–2.6)
MAGNESIUM SERPL-MCNC: 2 MG/DL — SIGNIFICANT CHANGE UP (ref 1.6–2.6)
MANUAL SMEAR VERIFICATION: SIGNIFICANT CHANGE UP
MCHC RBC-ENTMCNC: 24.7 PG — LOW (ref 27–34)
MCHC RBC-ENTMCNC: 24.7 PG — LOW (ref 27–34)
MCHC RBC-ENTMCNC: 31.4 GM/DL — LOW (ref 32–36)
MCHC RBC-ENTMCNC: 31.5 GM/DL — LOW (ref 32–36)
MCV RBC AUTO: 78.3 FL — LOW (ref 80–100)
MCV RBC AUTO: 78.7 FL — LOW (ref 80–100)
METHADONE UR-MCNC: NEGATIVE — SIGNIFICANT CHANGE UP
MICROCYTES BLD QL: SLIGHT — SIGNIFICANT CHANGE UP
MONOCYTES # BLD AUTO: 0.17 K/UL — SIGNIFICANT CHANGE UP (ref 0–0.9)
MONOCYTES NFR BLD AUTO: 4.4 % — SIGNIFICANT CHANGE UP (ref 2–14)
MYELOCYTES NFR BLD: 0.9 % — HIGH (ref 0–0)
NEUTROPHILS # BLD AUTO: 3.17 K/UL — SIGNIFICANT CHANGE UP (ref 1.8–7.4)
NEUTROPHILS NFR BLD AUTO: 83.3 % — HIGH (ref 43–77)
NITRITE UR-MCNC: NEGATIVE — SIGNIFICANT CHANGE UP
NON HDL CHOLESTEROL: 46 MG/DL — SIGNIFICANT CHANGE UP
NRBC # BLD: 0 /100 WBCS — SIGNIFICANT CHANGE UP (ref 0–0)
NRBC # BLD: 1 /100 — HIGH (ref 0–0)
NRBC # FLD: 0 K/UL — SIGNIFICANT CHANGE UP (ref 0–0)
NT-PROBNP SERPL-SCNC: 1395 PG/ML — HIGH
OPIATES UR-MCNC: NEGATIVE — SIGNIFICANT CHANGE UP
OXYCODONE UR-MCNC: NEGATIVE — SIGNIFICANT CHANGE UP
PCO2 BLDV: 40 MMHG — SIGNIFICANT CHANGE UP (ref 39–42)
PCO2 BLDV: 43 MMHG — HIGH (ref 39–42)
PCP SPEC-MCNC: SIGNIFICANT CHANGE UP
PCP UR-MCNC: NEGATIVE — SIGNIFICANT CHANGE UP
PH BLDV: 7.28 — LOW (ref 7.32–7.43)
PH BLDV: 7.31 — LOW (ref 7.32–7.43)
PH UR: 5.5 — SIGNIFICANT CHANGE UP (ref 5–8)
PHOSPHATE SERPL-MCNC: 5.4 MG/DL — HIGH (ref 2.5–4.5)
PHOSPHATE SERPL-MCNC: 5.6 MG/DL — HIGH (ref 2.5–4.5)
PHOSPHATE SERPL-MCNC: 5.7 MG/DL — HIGH (ref 2.5–4.5)
PLAT MORPH BLD: NORMAL — SIGNIFICANT CHANGE UP
PLATELET # BLD AUTO: 110 K/UL — LOW (ref 150–400)
PLATELET # BLD AUTO: 91 K/UL — LOW (ref 150–400)
PLATELET COUNT - ESTIMATE: ABNORMAL
PO2 BLDV: 54 MMHG — SIGNIFICANT CHANGE UP
PO2 BLDV: 87 MMHG — SIGNIFICANT CHANGE UP
POIKILOCYTOSIS BLD QL AUTO: SLIGHT — SIGNIFICANT CHANGE UP
POLYCHROMASIA BLD QL SMEAR: SLIGHT — SIGNIFICANT CHANGE UP
POTASSIUM BLDV-SCNC: 5.7 MMOL/L — HIGH (ref 3.5–5.1)
POTASSIUM BLDV-SCNC: 6.9 MMOL/L — CRITICAL HIGH (ref 3.5–5.1)
POTASSIUM SERPL-MCNC: 5.9 MMOL/L — HIGH (ref 3.5–5.3)
POTASSIUM SERPL-MCNC: 6 MMOL/L — HIGH (ref 3.5–5.3)
POTASSIUM SERPL-MCNC: 6.8 MMOL/L — CRITICAL HIGH (ref 3.5–5.3)
POTASSIUM SERPL-SCNC: 5.9 MMOL/L — HIGH (ref 3.5–5.3)
POTASSIUM SERPL-SCNC: 6 MMOL/L — HIGH (ref 3.5–5.3)
POTASSIUM SERPL-SCNC: 6.8 MMOL/L — CRITICAL HIGH (ref 3.5–5.3)
PROT SERPL-MCNC: 6.2 G/DL — SIGNIFICANT CHANGE UP (ref 6–8.3)
PROT SERPL-MCNC: 6.8 G/DL — SIGNIFICANT CHANGE UP (ref 6–8.3)
PROT SERPL-MCNC: 7.5 G/DL — SIGNIFICANT CHANGE UP (ref 6–8.3)
PROT UR-MCNC: ABNORMAL
PROTHROM AB SERPL-ACNC: 15.2 SEC — HIGH (ref 10.5–13.4)
RBC # BLD: 2.87 M/UL — LOW (ref 3.8–5.2)
RBC # BLD: 3 M/UL — LOW (ref 3.8–5.2)
RBC # FLD: 15.9 % — HIGH (ref 10.3–14.5)
RBC # FLD: 15.9 % — HIGH (ref 10.3–14.5)
RBC BLD AUTO: ABNORMAL
RBC CASTS # UR COMP ASSIST: 1 /HPF — SIGNIFICANT CHANGE UP (ref 0–4)
RH IG SCN BLD-IMP: POSITIVE — SIGNIFICANT CHANGE UP
RSV RNA NPH QL NAA+NON-PROBE: SIGNIFICANT CHANGE UP
SAO2 % BLDV: 81.9 % — SIGNIFICANT CHANGE UP
SAO2 % BLDV: 96.5 % — SIGNIFICANT CHANGE UP
SARS-COV-2 RNA SPEC QL NAA+PROBE: SIGNIFICANT CHANGE UP
SODIUM SERPL-SCNC: 129 MMOL/L — LOW (ref 135–145)
SODIUM SERPL-SCNC: 134 MMOL/L — LOW (ref 135–145)
SODIUM SERPL-SCNC: 135 MMOL/L — SIGNIFICANT CHANGE UP (ref 135–145)
SP GR SPEC: 1.01 — SIGNIFICANT CHANGE UP (ref 1.01–1.05)
T3 SERPL-MCNC: 79 NG/DL — LOW (ref 80–200)
T4 AB SER-ACNC: 5.51 UG/DL — SIGNIFICANT CHANGE UP (ref 5.1–13)
T4 FREE SERPL-MCNC: 1.2 NG/DL — SIGNIFICANT CHANGE UP (ref 0.9–1.8)
THC UR QL: NEGATIVE — SIGNIFICANT CHANGE UP
TRIGL SERPL-MCNC: 28 MG/DL — SIGNIFICANT CHANGE UP
TROPONIN T, HIGH SENSITIVITY RESULT: 39 NG/L — SIGNIFICANT CHANGE UP
TROPONIN T, HIGH SENSITIVITY RESULT: 41 NG/L — SIGNIFICANT CHANGE UP
TSH SERPL-MCNC: 40.8 UIU/ML — HIGH (ref 0.27–4.2)
UROBILINOGEN FLD QL: SIGNIFICANT CHANGE UP
WBC # BLD: 3.68 K/UL — LOW (ref 3.8–10.5)
WBC # BLD: 3.81 K/UL — SIGNIFICANT CHANGE UP (ref 3.8–10.5)
WBC # FLD AUTO: 3.68 K/UL — LOW (ref 3.8–10.5)
WBC # FLD AUTO: 3.81 K/UL — SIGNIFICANT CHANGE UP (ref 3.8–10.5)
WBC UR QL: SIGNIFICANT CHANGE UP /HPF (ref 0–5)

## 2022-11-25 PROCEDURE — 93306 TTE W/DOPPLER COMPLETE: CPT | Mod: 26

## 2022-11-25 PROCEDURE — 99291 CRITICAL CARE FIRST HOUR: CPT

## 2022-11-25 PROCEDURE — 93010 ELECTROCARDIOGRAM REPORT: CPT

## 2022-11-25 PROCEDURE — 99291 CRITICAL CARE FIRST HOUR: CPT | Mod: GC

## 2022-11-25 PROCEDURE — 99223 1ST HOSP IP/OBS HIGH 75: CPT

## 2022-11-25 PROCEDURE — 71045 X-RAY EXAM CHEST 1 VIEW: CPT | Mod: 26,77

## 2022-11-25 PROCEDURE — 36600 WITHDRAWAL OF ARTERIAL BLOOD: CPT

## 2022-11-25 PROCEDURE — 71045 X-RAY EXAM CHEST 1 VIEW: CPT | Mod: 26

## 2022-11-25 PROCEDURE — 99233 SBSQ HOSP IP/OBS HIGH 50: CPT

## 2022-11-25 PROCEDURE — 76937 US GUIDE VASCULAR ACCESS: CPT | Mod: 26

## 2022-11-25 PROCEDURE — 70450 CT HEAD/BRAIN W/O DYE: CPT | Mod: 26

## 2022-11-25 RX ORDER — DOPAMINE HYDROCHLORIDE 40 MG/ML
5 INJECTION, SOLUTION, CONCENTRATE INTRAVENOUS
Qty: 400 | Refills: 0 | Status: DISCONTINUED | OUTPATIENT
Start: 2022-11-25 | End: 2022-11-25

## 2022-11-25 RX ORDER — DEXTROSE 50 % IN WATER 50 %
50 SYRINGE (ML) INTRAVENOUS ONCE
Refills: 0 | Status: COMPLETED | OUTPATIENT
Start: 2022-11-25 | End: 2022-11-25

## 2022-11-25 RX ORDER — INSULIN HUMAN 100 [IU]/ML
5 INJECTION, SOLUTION SUBCUTANEOUS ONCE
Refills: 0 | Status: COMPLETED | OUTPATIENT
Start: 2022-11-25 | End: 2022-11-25

## 2022-11-25 RX ORDER — INSULIN LISPRO 100/ML
VIAL (ML) SUBCUTANEOUS EVERY 6 HOURS
Refills: 0 | Status: DISCONTINUED | OUTPATIENT
Start: 2022-11-25 | End: 2022-11-25

## 2022-11-25 RX ORDER — HYDROCORTISONE 20 MG
100 TABLET ORAL ONCE
Refills: 0 | Status: COMPLETED | OUTPATIENT
Start: 2022-11-25 | End: 2022-11-25

## 2022-11-25 RX ORDER — MAGNESIUM SULFATE 500 MG/ML
1 VIAL (ML) INJECTION ONCE
Refills: 0 | Status: COMPLETED | OUTPATIENT
Start: 2022-11-25 | End: 2022-11-25

## 2022-11-25 RX ORDER — PIPERACILLIN AND TAZOBACTAM 4; .5 G/20ML; G/20ML
3.38 INJECTION, POWDER, LYOPHILIZED, FOR SOLUTION INTRAVENOUS ONCE
Refills: 0 | Status: DISCONTINUED | OUTPATIENT
Start: 2022-11-25 | End: 2022-11-25

## 2022-11-25 RX ORDER — INSULIN GLARGINE 100 [IU]/ML
16 INJECTION, SOLUTION SUBCUTANEOUS AT BEDTIME
Refills: 0 | Status: DISCONTINUED | OUTPATIENT
Start: 2022-11-25 | End: 2022-11-29

## 2022-11-25 RX ORDER — PIPERACILLIN AND TAZOBACTAM 4; .5 G/20ML; G/20ML
3.38 INJECTION, POWDER, LYOPHILIZED, FOR SOLUTION INTRAVENOUS EVERY 8 HOURS
Refills: 0 | Status: COMPLETED | OUTPATIENT
Start: 2022-11-26 | End: 2022-11-28

## 2022-11-25 RX ORDER — CALCIUM GLUCONATE 100 MG/ML
1 VIAL (ML) INTRAVENOUS ONCE
Refills: 0 | Status: COMPLETED | OUTPATIENT
Start: 2022-11-25 | End: 2022-11-25

## 2022-11-25 RX ORDER — PIPERACILLIN AND TAZOBACTAM 4; .5 G/20ML; G/20ML
3.38 INJECTION, POWDER, LYOPHILIZED, FOR SOLUTION INTRAVENOUS ONCE
Refills: 0 | Status: COMPLETED | OUTPATIENT
Start: 2022-11-25 | End: 2022-11-25

## 2022-11-25 RX ORDER — SODIUM CHLORIDE 9 MG/ML
1000 INJECTION, SOLUTION INTRAVENOUS
Refills: 0 | Status: DISCONTINUED | OUTPATIENT
Start: 2022-11-25 | End: 2022-11-29

## 2022-11-25 RX ORDER — CALCIUM GLUCONATE 100 MG/ML
2 VIAL (ML) INTRAVENOUS ONCE
Refills: 0 | Status: COMPLETED | OUTPATIENT
Start: 2022-11-25 | End: 2022-11-25

## 2022-11-25 RX ORDER — INSULIN LISPRO 100/ML
VIAL (ML) SUBCUTANEOUS EVERY 6 HOURS
Refills: 0 | Status: DISCONTINUED | OUTPATIENT
Start: 2022-11-25 | End: 2022-11-26

## 2022-11-25 RX ORDER — DOPAMINE HYDROCHLORIDE 40 MG/ML
4 INJECTION, SOLUTION, CONCENTRATE INTRAVENOUS
Qty: 400 | Refills: 0 | Status: DISCONTINUED | OUTPATIENT
Start: 2022-11-25 | End: 2022-11-26

## 2022-11-25 RX ORDER — SODIUM ZIRCONIUM CYCLOSILICATE 10 G/10G
10 POWDER, FOR SUSPENSION ORAL ONCE
Refills: 0 | Status: COMPLETED | OUTPATIENT
Start: 2022-11-25 | End: 2022-11-25

## 2022-11-25 RX ORDER — FUROSEMIDE 40 MG
80 TABLET ORAL ONCE
Refills: 0 | Status: COMPLETED | OUTPATIENT
Start: 2022-11-25 | End: 2022-11-25

## 2022-11-25 RX ORDER — DEXTROSE 50 % IN WATER 50 %
50 SYRINGE (ML) INTRAVENOUS ONCE
Refills: 0 | Status: DISCONTINUED | OUTPATIENT
Start: 2022-11-25 | End: 2022-11-25

## 2022-11-25 RX ORDER — DEXTROSE 50 % IN WATER 50 %
15 SYRINGE (ML) INTRAVENOUS ONCE
Refills: 0 | Status: DISCONTINUED | OUTPATIENT
Start: 2022-11-25 | End: 2022-11-29

## 2022-11-25 RX ORDER — DEXTROSE 50 % IN WATER 50 %
25 SYRINGE (ML) INTRAVENOUS ONCE
Refills: 0 | Status: DISCONTINUED | OUTPATIENT
Start: 2022-11-25 | End: 2022-11-29

## 2022-11-25 RX ORDER — BENZOCAINE AND MENTHOL 5; 1 G/100ML; G/100ML
1 LIQUID ORAL ONCE
Refills: 0 | Status: COMPLETED | OUTPATIENT
Start: 2022-11-25 | End: 2022-11-25

## 2022-11-25 RX ORDER — GLUCAGON INJECTION, SOLUTION 0.5 MG/.1ML
1 INJECTION, SOLUTION SUBCUTANEOUS ONCE
Refills: 0 | Status: DISCONTINUED | OUTPATIENT
Start: 2022-11-25 | End: 2022-11-29

## 2022-11-25 RX ORDER — LEVOTHYROXINE SODIUM 125 MCG
100 TABLET ORAL
Refills: 0 | Status: DISCONTINUED | OUTPATIENT
Start: 2022-11-25 | End: 2022-11-27

## 2022-11-25 RX ORDER — CHLORHEXIDINE GLUCONATE 213 G/1000ML
1 SOLUTION TOPICAL
Refills: 0 | Status: DISCONTINUED | OUTPATIENT
Start: 2022-11-25 | End: 2022-11-29

## 2022-11-25 RX ORDER — FLUMAZENIL 0.1 MG/ML
0.1 VIAL (ML) INTRAVENOUS ONCE
Refills: 0 | Status: COMPLETED | OUTPATIENT
Start: 2022-11-25 | End: 2022-11-25

## 2022-11-25 RX ORDER — INSULIN HUMAN 100 [IU]/ML
5 INJECTION, SOLUTION SUBCUTANEOUS ONCE
Refills: 0 | Status: DISCONTINUED | OUTPATIENT
Start: 2022-11-25 | End: 2022-11-25

## 2022-11-25 RX ORDER — DEXTROSE 50 % IN WATER 50 %
12.5 SYRINGE (ML) INTRAVENOUS ONCE
Refills: 0 | Status: DISCONTINUED | OUTPATIENT
Start: 2022-11-25 | End: 2022-11-29

## 2022-11-25 RX ADMIN — Medication 2 GRAM(S): at 10:30

## 2022-11-25 RX ADMIN — INSULIN HUMAN 5 UNIT(S): 100 INJECTION, SOLUTION SUBCUTANEOUS at 22:43

## 2022-11-25 RX ADMIN — INSULIN HUMAN 5 UNIT(S): 100 INJECTION, SOLUTION SUBCUTANEOUS at 10:44

## 2022-11-25 RX ADMIN — Medication 50 MILLILITER(S): at 16:22

## 2022-11-25 RX ADMIN — Medication 200 GRAM(S): at 10:00

## 2022-11-25 RX ADMIN — Medication 200 GRAM(S): at 23:47

## 2022-11-25 RX ADMIN — Medication 100 GRAM(S): at 16:23

## 2022-11-25 RX ADMIN — Medication 100 MICROGRAM(S): at 18:46

## 2022-11-25 RX ADMIN — Medication 0.1 MILLIGRAM(S): at 10:58

## 2022-11-25 RX ADMIN — PIPERACILLIN AND TAZOBACTAM 200 GRAM(S): 4; .5 INJECTION, POWDER, LYOPHILIZED, FOR SOLUTION INTRAVENOUS at 16:24

## 2022-11-25 RX ADMIN — Medication 2: at 17:50

## 2022-11-25 RX ADMIN — Medication 100 MILLIGRAM(S): at 12:32

## 2022-11-25 RX ADMIN — Medication 100 GRAM(S): at 22:41

## 2022-11-25 RX ADMIN — Medication 50 MILLILITER(S): at 10:45

## 2022-11-25 RX ADMIN — Medication 80 MILLIGRAM(S): at 11:04

## 2022-11-25 RX ADMIN — SODIUM ZIRCONIUM CYCLOSILICATE 10 GRAM(S): 10 POWDER, FOR SUSPENSION ORAL at 17:18

## 2022-11-25 RX ADMIN — INSULIN GLARGINE 16 UNIT(S): 100 INJECTION, SOLUTION SUBCUTANEOUS at 22:42

## 2022-11-25 RX ADMIN — INSULIN HUMAN 5 UNIT(S): 100 INJECTION, SOLUTION SUBCUTANEOUS at 16:25

## 2022-11-25 RX ADMIN — DOPAMINE HYDROCHLORIDE 10.7 MICROGRAM(S)/KG/MIN: 40 INJECTION, SOLUTION, CONCENTRATE INTRAVENOUS at 17:52

## 2022-11-25 RX ADMIN — DOPAMINE HYDROCHLORIDE 10.7 MICROGRAM(S)/KG/MIN: 40 INJECTION, SOLUTION, CONCENTRATE INTRAVENOUS at 19:28

## 2022-11-25 RX ADMIN — Medication 50 MILLILITER(S): at 22:42

## 2022-11-25 RX ADMIN — DOPAMINE HYDROCHLORIDE 13.1 MICROGRAM(S)/KG/MIN: 40 INJECTION, SOLUTION, CONCENTRATE INTRAVENOUS at 10:32

## 2022-11-25 RX ADMIN — BENZOCAINE AND MENTHOL 1 LOZENGE: 5; 1 LIQUID ORAL at 23:47

## 2022-11-25 NOTE — CONSULT NOTE ADULT - ATTENDING COMMENTS
57F with TSH 40.8, bradycardia, hypothermia, hypotension on Dopamine in CCU consistent with myxedema coma.  Random cortisol with appropriate level (verify that blood sent before hydrocortisone given - if so then no need for ongoing steroid).  Agree with levothyroxine 100 mcg IV now then daily. DM2 uncontrolled recheck HbA1c will follow.  Patient is high risk and high level decision making, requiring ICU level of care.    Citlalli Mejia MD  Division of Endocrinology  Pager: [phone]    If after 6PM or before 9AM, or on weekends/holidays, please call endocrine answering service for assistance ([phone]).  For nonurgent matters email [email or web address] for assistance.

## 2022-11-25 NOTE — ED PROVIDER NOTE - OBJECTIVE STATEMENT
Carlos Kathia, PGY-3- 57 year old female with a pmhx of CKD, DM, HTN, CHF, pHTN, is brought to ED by EMS for evaluation of dizziness. On EMS arrival, pt with HR 30. Pt received 18G LUE IV and was given 5 mg Midazolam for pre-pacing sedation. Pt was then paced at 70 bpm 60 amps. Pt was AOX4 on EMS arrival but is now responsive to painful post Versed. Pt without hx of bradycardia per EMS. Per chart review, pt with hx of hyperkalemia.

## 2022-11-25 NOTE — ED PROVIDER NOTE - CARE PLAN
1 Principal Discharge DX:	Hyperkalemia  Secondary Diagnosis:	Acute renal failure  Secondary Diagnosis:	Hyperglycemia  Secondary Diagnosis:	Dizziness  Secondary Diagnosis:	Bradycardia

## 2022-11-25 NOTE — H&P ADULT - NSHPPHYSICALEXAM_GEN_ALL_CORE
PHYSICAL EXAM:  CONSTITUTIONAL: sedated, not responding to commands   HEAD: Atraumatic  EYES: Clear bilaterally, pupils equal, round and reactive to light.  ENMT: macroglossia  CARDIAC: bradycardia. +S1/S2. No murmurs, rubs or gallops.  RESPIRATORY: CTAB  ABDOMEN:  Soft, nondistended  NEUROLOGICAL: AOx0, not responding to commands   SKIN: Skin warm and dry. No evidence of rashes or lesions.

## 2022-11-25 NOTE — CONSULT NOTE ADULT - NS ATTEND AMEND GEN_ALL_CORE FT
57yoF w/ PMHx HTN, HLD, DM, HFpEF, CKD presents after calling EMS c/o dizziness without syncope, found to have HR 20's.  Was given versed and transcutaneously paced.  In ER, transcutaneous pacing discontinued and found to be in NSR/SB 20-30's.  Patient s/p versed administration and was not able to answer questions regarding his history of illness.  Started on dopamine gtt given SBP 's and admitted to CCU.  Labs significant for hyperkalemia K 6.8, pH 2.8. Plan to correct electrolyte derangements, will follow.

## 2022-11-25 NOTE — ED ADULT NURSE NOTE - NS ED NURSE REPORT GIVEN DT
25-Nov-2022 12:43 distance and reading/Partially impaired: cannot see medication labels or newsprint, but can see obstacles in path, and the surrounding layout; can count fingers at arm's length

## 2022-11-25 NOTE — ED ADULT TRIAGE NOTE - CHIEF COMPLAINT QUOTE
notification for low HR 30s, placed on external pacer by EMS. Midazolam was given. Taken straight to [facility]. Hx HTN, HLD, DM2

## 2022-11-25 NOTE — PROCEDURE NOTE - NSPROCDETAILS_GEN_ALL_CORE
location identified, draped/prepped, sterile technique used, needle inserted/introduced/positive blood return obtained via catheter

## 2022-11-25 NOTE — CHART NOTE - NSCHARTNOTEFT_GEN_A_CORE
Brief Endocrine Follow Up Note:    This is a 56 yo F /w a PMH of hypothyroidism the endocrine team was consulted on earlier today for concern for myxedema given presentation with AMS, hypothermia, hypotension, and elevated TSH. FT4 resulted 1.2.     Primary team reports AM cortisol resulted 26. It appears from the timing documented in the chart that the patient got the 100mg IV hydrocortisone at 12:32PM and the labs were drawn at 12:25. If sequence of events is accurate the high serum cortisol likely reflects the patient's own response to critical illness.     The patient has not received any further steroids since the dose at 12:25AM  Patient has been weaned off of pressors at this time    Recommendations:  Hold further hydrocortisone  Recheck AM serum cortisol at 8AM sharp  If patient decompensates please restart hydrocortisone 100mg every 8 hours    Case discussed with DICK Rojas MD   Endocrinology Fellow Brief Endocrine Follow Up Note:    This is a 58 yo F /w a PMH of hypothyroidism the endocrine team was consulted on earlier today for concern for myxedema given presentation with AMS, hypothermia, hypotension, and elevated TSH. FT4 resulted 1.2.     Primary team reports AM cortisol resulted 26. It appears from the timing documented in the chart that the patient got the 100mg IV hydrocortisone at 12:32PM. It is unclear if the labs were drawn or if they resulted at 12:25. The AM cortisol was done as an add on test to a troponin and the order for the troponin was written at 11:01AM. So if lab was drawn around then and hydrocortisone only given at 12:32, then this serum cortisol level would reflect the patient's own endogenous cortisol production in response to critical illness.    The patient has not received any further steroids since the dose at 12:25AM  Patient has been weaned off of pressors at this time    Recommendations:  Please clarify when the tube that the cortisol was added to either was drawn or resulted at 12:25. If tube was drawn at 11:01AM then we can safely stop the hydrocortisone  If there is any uncertainty on the timing of the tube draw, then please resume hydrocortisone 100mg every 8 hours for now. We can titrate her dose down over time and then reassess her HPA axis once on lower doses of hydrocortisone    Case and recommendations discussed with DICK Rojas MD   Endocrinology Fellow

## 2022-11-25 NOTE — PROCEDURE NOTE - NSCOMPLICATION_GEN_A_CORE
IMPRESSION: 57 Ashley Lopez NP  [employer]  [phone] no complications

## 2022-11-25 NOTE — CONSULT NOTE ADULT - SUBJECTIVE AND OBJECTIVE BOX
Date of Admission: 11/25/2022    CHIEF COMPLAINT: Dizziness     HISTORY OF PRESENT ILLNESS:      Allergies  No Known Allergies  Intolerances    MEDICATIONS:  DOPamine Infusion 5 MICROgram(s)/kG/Min IV Continuous <Continuous>    PAST MEDICAL & SURGICAL HISTORY:  HTN (hypertension)  HLD (hyperlipidemia)  DM (diabetes mellitus)  No significant past surgical history    FAMILY HISTORY:  No pertinent family history in first degree relatives    REVIEW OF SYSTEMS:  See HPI. Otherwise, 10 point ROS done and otherwise negative.    PHYSICAL EXAM:  T(C): 36.4 (11-25-22 @ 10:49), Max: 37 (11-25-22 @ 10:32)  HR: 45 (11-25-22 @ 10:32) (44 - 70)  BP: 97/48 (11-25-22 @ 10:32) (97/48 - 116/74)  RR: 15 (11-25-22 @ 10:32) (14 - 22)  SpO2: 100% (11-25-22 @ 10:32) (100% - 100%)  Wt(kg): --  I&O's Summary    Appearance: Normal	  HEENT:   Normal oral mucosa, PERRL, EOMI	  Lymphatic: No lymphadenopathy  Cardiovascular: Normal S1 S2, No JVD, No murmurs, No edema  Respiratory: Lungs clear to auscultation	  Psychiatry: A & O x 3, Mood & affect appropriate  Gastrointestinal:  Soft, Non-tender, + BS	  Skin: No rashes, No ecchymoses, No cyanosis	  Neurologic: Non-focal  Extremities: Normal range of motion, No clubbing, cyanosis or edema  Vascular: Peripheral pulses palpable 2+ bilaterally    LABS:	 	  CBC Full  -  ( 25 Nov 2022 09:57 )  WBC Count : x  Hemoglobin : 7.1 g/dL  Hematocrit : 22.6 %  Platelet Count - Automated : x  Mean Cell Volume : 78.7 fL  Mean Cell Hemoglobin : 24.7 pg  Mean Cell Hemoglobin Concentration : 31.4 gm/dL  Auto Neutrophil # : x  Auto Lymphocyte # : x  Auto Monocyte # : x  Auto Eosinophil # : x  Auto Basophil # : x  Auto Neutrophil % : x  Auto Lymphocyte % : x  Auto Monocyte % : x  Auto Eosinophil % : x  Auto Basophil % : x    11-25    x   |  x   |  99<H>  ----------------------------<  374<H>  x    |  19<L>  |  2.19<H>    Ca    8.8      25 Nov 2022 09:57  Phos  5.7     11-25  Mg     1.90     11-25    TPro  6.2  /  Alb  3.5  /  TBili  0.6  /  DBili  x   /  AST  85<H>  /  ALT  112<H>  /  AlkPhos  385<H>  11-25     Date of Admission: 11/25/2022    CHIEF COMPLAINT: Dizziness     HISTORY OF PRESENT ILLNESS:  This is a 57yoF w/ PMHx HTN, HLD, DM, HFpEF, CKD presents after calling EMS c/o dizziness without syncope, found to have HR 20's.  Was given versed and transcutaneously paced.  In ER, transcutaneous pacing discontinued and found to be in NSR/SB 20-30's.  Patient s/p versed administration and was not able to answer questions regarding his history of illness.  Started on dopamine gtt given SBP 's and admitted to CCU.  Labs significant for hyperkalemia K 6.8, pH 2.8.  EPS consulted for symptomatic bradycardia.      Allergies  No Known Allergies  Intolerances    MEDICATIONS:  DOPamine Infusion 5 MICROgram(s)/kG/Min IV Continuous <Continuous>    PAST MEDICAL & SURGICAL HISTORY:  HTN (hypertension)  HLD (hyperlipidemia)  DM (diabetes mellitus)  No significant past surgical history    FAMILY HISTORY:  No pertinent family history in first degree relatives    REVIEW OF SYSTEMS:  See HPI. Otherwise, 10 point ROS done and otherwise negative.    PHYSICAL EXAM:  T(C): 36.4 (11-25-22 @ 10:49), Max: 37 (11-25-22 @ 10:32)  HR: 45 (11-25-22 @ 10:32) (44 - 70)  BP: 97/48 (11-25-22 @ 10:32) (97/48 - 116/74)  RR: 15 (11-25-22 @ 10:32) (14 - 22)  SpO2: 100% (11-25-22 @ 10:32) (100% - 100%)  Wt(kg): --  I&O's Summary    Appearance: Normal	  HEENT:   Normal oral mucosa, PERRL, EOMI	  Lymphatic: No lymphadenopathy  Cardiovascular: Normal S1 S2, No JVD, No murmurs, No edema  Respiratory: Lungs clear to auscultation	  Psychiatry: A & O x 3, Mood & affect appropriate  Gastrointestinal:  Soft, Non-tender, + BS	  Skin: No rashes, No ecchymoses, No cyanosis	  Neurologic: Non-focal  Extremities: Normal range of motion, No clubbing, cyanosis or edema  Vascular: Peripheral pulses palpable 2+ bilaterally    LABS:	 	  CBC Full  -  ( 25 Nov 2022 09:57 )  WBC Count : x  Hemoglobin : 7.1 g/dL  Hematocrit : 22.6 %  Platelet Count - Automated : x  Mean Cell Volume : 78.7 fL  Mean Cell Hemoglobin : 24.7 pg  Mean Cell Hemoglobin Concentration : 31.4 gm/dL  Auto Neutrophil # : x  Auto Lymphocyte # : x  Auto Monocyte # : x  Auto Eosinophil # : x  Auto Basophil # : x  Auto Neutrophil % : x  Auto Lymphocyte % : x  Auto Monocyte % : x  Auto Eosinophil % : x  Auto Basophil % : x    11-25    x   |  x   |  99<H>  ----------------------------<  374<H>  x    |  19<L>  |  2.19<H>    Ca    8.8      25 Nov 2022 09:57  Phos  5.7     11-25  Mg     1.90     11-25    TPro  6.2  /  Alb  3.5  /  TBili  0.6  /  DBili  x   /  AST  85<H>  /  ALT  112<H>  /  AlkPhos  385<H>  11-25

## 2022-11-25 NOTE — CONSULT NOTE ADULT - ASSESSMENT
57yoF w/ PMHx HTN, HLD, DM, HFpEF, CKD presents after calling EMS c/o dizziness without syncope, found to have HR 20's.  Was given versed and transcutaneously paced.  In ER, transcutaneous pacing discontinued and found to be in NSR/SB 20-30's.  Patient s/p versed administration and was not able to answer questions regarding his history of illness.  Started on dopamine gtt given SBP 's and admitted to CCU.  Labs significant for hyperkalemia K 6.8, pH 2.8.  EPS consulted for symptomatic bradycardia.      Symptomatic bradycardia  - Likely 2/2 electrolyte dysfunction - correct underlying condition   - No pacing indications at this time   - On dopamine gtt- wean as tolerated  - appreciate nephro recs

## 2022-11-25 NOTE — H&P ADULT - ASSESSMENT
ASSESSMENT AND PLAN:  Shila Hernadnez is a 57 year old female with pmh of HTN, DM, pHTN, CKD presenting with dizziness, found to be profoundly bradycardic to the 30s, also noted to have a potassium of 6.9 on inital laboratory work. Patient admitted to MICU for management of hemodynamic instability and nephrology evaluation.     #Neuro  - AMS/lethargy- likely cardiac in origin i/s/o profound bradycardia vs 2/2 midazolam used prior to transcutaneous pacing     #Cardiovascular  - profound bradycardia- was transcutaneously paced by EMS  - Dopamine infusion     #Respiratory  -no active issues    #GI/Nutrition  - NPO in setting of AMS, if no resolution will re-evaluate nutrition     #/Renal  -CKD with hyperkalemia to 6.9 on initial labs without hemolysis. Awaiting repeat CMP, may require nephrology/HD    #Skin  -no active issues     #ID  - no active issues    #Endocrine  - history of hypothyroidism, macroglossia on exam- will check tsh/free t4     #Hematologic/DVT ppx  - anemia/thrombocytopenia- possible CKD contribution- will trend cbc's   - may be close to her baseline   - type/screen

## 2022-11-25 NOTE — ED PROVIDER NOTE - ATTENDING CONTRIBUTION TO CARE
Pt was seen and evaluated by me. Pt is a 56 y/o female with a PMHx of CKD, DM type 2, HTN, CHF, and HTN who presented to the ED for by EMS for dizziness. EMS was called to pt's residence and BLS crew found pt to be ravinder in the 30s. ACLS was called and gave pt 5mg IV of Versed. Pt was reported to be A&OX 3 prior. Pt arrived being paced at 70 and lethargic. Pt not responsive to voice.   VITALS: Vitals have been reviewed.  GEN APPEARANCE: Lethargic  HEAD: Atraumatic, normocephalic.   EYES: PERRL.   NOSE: No nasal discharge.   THROAT: MMM.   CV: Ravinder, being paced  LUNGS: CTAB. No wheezing. No rales. No rhonchi. No diminished breath sounds.   ABDOMEN: Soft, mild ascites noted  MSK/EXT: Spine appears normal, no spine point tenderness.   NEURO: Lethargic.  SKIN: Normal color for race, warm, dry and intact. No evidence of rash.  56 y/o female with a PMHx of CKD, DM type 2, HTN, CHF, and HTN who presented to the ED for by EMS for dizziness.   Concern for bradycardia/heart block/DKA/Renal Failure/Medication reaction  Labs, EKG, CXR, Calcium

## 2022-11-25 NOTE — ED ADULT NURSE NOTE - OBJECTIVE STATEMENT
notification for low HR 30s, placed on external pacer by EMS. Midazolam was given. Taken straight to TrC. Hx HTN, HLD, DM2  Original call from EMS was for dizziness. Pt became unresponsive on route. Patient given medication via left arm 18 gauge IV lock placed by EMS. Pt brought to [facility] and evaluated by MD for above complaints. Pt responsive to tactile stimuli and intermittently moving arms, legs and head. 20 gauge IV lock place to right antecubital and labs drawn and sent. Pt placed on pacer pads. Paced at 70BPM. Singh catheter placed to bedside drainage. Pt comfortable. No indication of pain.

## 2022-11-25 NOTE — CONSULT NOTE ADULT - SUBJECTIVE AND OBJECTIVE BOX
ENDOCRINE INITIAL CONSULT - hypothyroidism    HPI:  58 yo F with PMH of HTN, HLD, DM2, CKD, pulm HTN, hypothyroidism presenting with dizziness and found to be bradycardic to the 30s, was transcutanously paced by EMS, also found to be hyperkalemic in the ED. Patient was sedated with versed by EMS, prior was AOx4, is now lethargic but arousible.  (25 Nov 2022 13:42)      ENDOCRINE HISTORY       PAST MEDICAL & SURGICAL HISTORY:  HTN (hypertension)      HLD (hyperlipidemia)      DM (diabetes mellitus)      Hypothyroid      H/O pulmonary hypertension      CKD (chronic kidney disease)      No significant past surgical history          FAMILY HISTORY:      Social History:      Home Medications:  amLODIPine 10 mg oral tablet: 1 tab(s) orally once a day (07 Oct 2022 12:42)  atorvastatin 40 mg oral tablet: 1 tab(s) orally once a day (07 Oct 2022 12:42)  labetalol 100 mg oral tablet: 0.5 tab(s) orally 2 times a day (07 Oct 2022 12:42)  levothyroxine 50 mcg (0.05 mg) oral capsule: 1 cap(s) orally once a day (07 Oct 2022 12:42)  pantoprazole 40 mg oral delayed release tablet: 1 tab(s) orally once a day (07 Oct 2022 12:42)  repaglinide 0.5 mg oral tablet: 1 tab(s) orally 3 times a day (before meals) (07 Oct 2022 12:42)      MEDICATIONS  (STANDING):  chlorhexidine 2% Cloths 1 Application(s) Topical <User Schedule>  DOPamine Infusion 5 MICROgram(s)/kG/Min (13.1 mL/Hr) IV Continuous <Continuous>    MEDICATIONS  (PRN):      Allergies    No Known Allergies    Intolerances        REVIEW OF SYSTEMS  UNABLE TO OBTAIN     PHYSICAL EXAM   Vital Signs Last 24 Hrs  T(C): 31.4 (25 Nov 2022 13:21), Max: 37 (25 Nov 2022 10:32)  T(F): 88.5 (25 Nov 2022 13:21), Max: 98.6 (25 Nov 2022 10:32)  HR: 39 (25 Nov 2022 14:30) (31 - 70)  BP: 139/54 (25 Nov 2022 14:15) (74/47 - 141/70)  BP(mean): 72 (25 Nov 2022 14:15) (57 - 90)  RR: 16 (25 Nov 2022 14:30) (9 - 24)  SpO2: 100% (25 Nov 2022 14:30) (97% - 100%)    Parameters below as of 25 Nov 2022 13:21  Patient On (Oxygen Delivery Method): nasal cannula  O2 Flow (L/min): 3    GENERAL: NAD, well-groomed, well-developed  EYES: No proptosis, no lid lag, anicteric  HEENT:  Atraumatic, Normocephalic, moist mucous membranes  THYROID: Normal size, no palpable nodules  RESPIRATORY: Clear to auscultation bilaterally; No rales, rhonchi, wheezing  CARDIOVASCULAR: Regular rate and rhythm; No murmurs; no peripheral edema  GI: Soft, nontender, non distended, normal bowel sounds  SKIN: Dry, intact, No rashes or lesions  MUSCULOSKELETAL: Full range of motion, normal strength  NEURO: sensation intact, extraocular movements intact, no tremor  PSYCH: Alert and oriented x 3, reactive affect/mood   CUSHING'S SIGNS: no striae    CAPILLARY BLOOD GLUCOSE      POCT Blood Glucose.: 148 mg/dL (25 Nov 2022 13:42)  POCT Blood Glucose.: 463 mg/dL (25 Nov 2022 11:07)  POCT Blood Glucose.: 381 mg/dL (25 Nov 2022 10:37)  POCT Blood Glucose.: 385 mg/dL (25 Nov 2022 09:52)      eMAR:dextrose 50% Injectable   50 milliLiter(s) IV Push (11-25-22 @ 10:45)    hydrocortisone sodium succinate Injectable   100 milliGRAM(s) IV Push (11-25-22 @ 12:32)    insulin regular  human recombinant   5 Unit(s) IV Push (11-25-22 @ 10:44)        A1C with Estimated Average Glucose Result: 9.1 % (09-29-22 @ 06:00)  A1C with Estimated Average Glucose Result: 9.2 % (09-28-22 @ 07:47)                            7.4    3.68  )-----------( 110      ( 25 Nov 2022 14:20 )             23.5       11-25    129<L>  |  99  |  99<H>  ----------------------------<  374<H>  6.8<HH>   |  19<L>  |  2.19<H>  eGFR: 26 mL/min/1.73m2 (11.25.22 @ 14:20)        Ca    8.8      25 Nov 2022 09:57  Phos  5.7     11-25  Mg     1.90     11-25    TPro  6.2  /  Alb  3.5  /  TBili  0.6  /  DBili  x   /  AST  85<H>  /  ALT  112<H>  /  AlkPhos  385<H>  11-25      Thyroid Stimulating Hormone, Serum: 40.80 uIU/mL (11-25-22 @ 09:57)  Free Thyroxine, Serum: 1.2 ng/dL (11.25.22 @ 12:25)  Triiodothyronine, Total (T3 Total): 79 ng/dL (11.25.22 @ 09:57)    LIPIDS    RADIOLOGY ENDOCRINE INITIAL CONSULT - hypothyroidism    HPI:  58 yo F with PMH of HTN, HLD, DM2, CKD, pulm HTN, hypothyroidism presenting with dizziness and found to be bradycardic to the 30s, was transcutanously paced by EMS, also found to be hyperkalemic in the ED. Patient was sedated with versed by EMS, prior was AOx4, is now lethargic but arousible.  (25 Nov 2022 13:42)      ENDOCRINE HISTORY   Patient seen while in CCU. Unable to obtain history from patient, lethargic. Mildly arousable but not able to cooperate with history.   Currently on dopamine ggt. Initially presented with dizziness & severe bradycardia to the 30s requiring transcutaneous pacing by EMS. Also with hypotension, hypothermia, hyponatremia, hyperkalemia and noted to have an enlarged tongue on exam.   From chart review, patient with hx of hypothyroidism & uncontrolled dm2 (a1c 9.2%).   Was recently admitted in Late September/Early October 2022 for hypertensive emergency. TSH at that time was noted to be elevated to 8, no FT4 seen.   Patient was continued on levothyroxine 50mcg daily at that time.   From prior documentation, patient reportedly was on Levemir 20 units daily at home and Prandin 0.5mg tidac.   These medications were continued on discharge from the medication reconciliation seen in [facility].     PAST MEDICAL & SURGICAL HISTORY:  HTN (hypertension)      HLD (hyperlipidemia)      DM (diabetes mellitus)      Hypothyroid      H/O pulmonary hypertension      CKD (chronic kidney disease)      No significant past surgical history          FAMILY HISTORY:      Social History:      Home Medications:  amLODIPine 10 mg oral tablet: 1 tab(s) orally once a day (07 Oct 2022 12:42)  atorvastatin 40 mg oral tablet: 1 tab(s) orally once a day (07 Oct 2022 12:42)  labetalol 100 mg oral tablet: 0.5 tab(s) orally 2 times a day (07 Oct 2022 12:42)  levothyroxine 50 mcg (0.05 mg) oral capsule: 1 cap(s) orally once a day (07 Oct 2022 12:42)  pantoprazole 40 mg oral delayed release tablet: 1 tab(s) orally once a day (07 Oct 2022 12:42)  repaglinide 0.5 mg oral tablet: 1 tab(s) orally 3 times a day (before meals) (07 Oct 2022 12:42)      MEDICATIONS  (STANDING):  chlorhexidine 2% Cloths 1 Application(s) Topical <User Schedule>  DOPamine Infusion 5 MICROgram(s)/kG/Min (13.1 mL/Hr) IV Continuous <Continuous>    MEDICATIONS  (PRN):      Allergies    No Known Allergies    Intolerances        REVIEW OF SYSTEMS  UNABLE TO OBTAIN     PHYSICAL EXAM   Vital Signs Last 24 Hrs  T(C): 31.4 (25 Nov 2022 13:21), Max: 37 (25 Nov 2022 10:32)  T(F): 88.5 (25 Nov 2022 13:21), Max: 98.6 (25 Nov 2022 10:32)  HR: 39 (25 Nov 2022 14:30) (31 - 70)  BP: 139/54 (25 Nov 2022 14:15) (74/47 - 141/70)  BP(mean): 72 (25 Nov 2022 14:15) (57 - 90)  RR: 16 (25 Nov 2022 14:30) (9 - 24)  SpO2: 100% (25 Nov 2022 14:30) (97% - 100%)    Parameters below as of 25 Nov 2022 13:21  Patient On (Oxygen Delivery Method): nasal cannula  O2 Flow (L/min): 3    GENERAL: middle aged female, obese, somnolent  EYES: No proptosis, no lid lag  HEENT:  Atraumatic, Normocephalic, moist mucous membranes, enlarged tongue  THYROID: Normal size, no palpable nodules  RESPIRATORY: Clear to auscultation bilaterally; No rales, rhonchi, wheezing, NC, snoring  CARDIOVASCULAR: bradycardia, pitting edema in b/l LE   GI: Soft, non distended  SKIN: Dry, intact, No rashes or lesions; dry skin noted on b/l LE   MUSCULOSKELETAL: Full range of motion, normal strength   NEURO: sensation intact, no tremors, somnolent/lethargic,   PSYCH: unable to assess   CUSHING'S SIGNS: no striae    CAPILLARY BLOOD GLUCOSE      POCT Blood Glucose.: 148 mg/dL (25 Nov 2022 13:42)  POCT Blood Glucose.: 463 mg/dL (25 Nov 2022 11:07)  POCT Blood Glucose.: 381 mg/dL (25 Nov 2022 10:37)  POCT Blood Glucose.: 385 mg/dL (25 Nov 2022 09:52)      eMAR:dextrose 50% Injectable   50 milliLiter(s) IV Push (11-25-22 @ 10:45)    hydrocortisone sodium succinate Injectable   100 milliGRAM(s) IV Push (11-25-22 @ 12:32)    insulin regular  human recombinant   5 Unit(s) IV Push (11-25-22 @ 10:44)        A1C with Estimated Average Glucose Result: 9.1 % (09-29-22 @ 06:00)  A1C with Estimated Average Glucose Result: 9.2 % (09-28-22 @ 07:47)                            7.4    3.68  )-----------( 110      ( 25 Nov 2022 14:20 )             23.5       11-25    129<L>  |  99  |  99<H>  ----------------------------<  374<H>  6.8<HH>   |  19<L>  |  2.19<H>  eGFR: 26 mL/min/1.73m2 (11.25.22 @ 14:20)        Ca    8.8      25 Nov 2022 09:57  Phos  5.7     11-25  Mg     1.90     11-25    TPro  6.2  /  Alb  3.5  /  TBili  0.6  /  DBili  x   /  AST  85<H>  /  ALT  112<H>  /  AlkPhos  385<H>  11-25      Thyroid Stimulating Hormone, Serum: 40.80 uIU/mL (11-25-22 @ 09:57)  Free Thyroxine, Serum: 1.2 ng/dL (11.25.22 @ 12:25)  Triiodothyronine, Total (T3 Total): 79 ng/dL (11.25.22 @ 09:57)    LIPIDS    RADIOLOGY

## 2022-11-25 NOTE — H&P ADULT - NSICDXPASTMEDICALHX_GEN_ALL_CORE_FT
PAST MEDICAL HISTORY:  DM (diabetes mellitus)     H/O pulmonary hypertension     HLD (hyperlipidemia)     HTN (hypertension)     Hypothyroid      PAST MEDICAL HISTORY:  CKD (chronic kidney disease)     DM (diabetes mellitus)     H/O pulmonary hypertension     HLD (hyperlipidemia)     HTN (hypertension)     Hypothyroid

## 2022-11-25 NOTE — ED ADULT NURSE REASSESSMENT NOTE - NS ED NURSE REASSESS COMMENT FT1
Elian RN: MICU resident at bedside assessing pt, dopamine remains in progress, pt responsive to tactile stimuli. will continue to monitor.

## 2022-11-25 NOTE — H&P ADULT - NSHPLABSRESULTS_GEN_ALL_CORE
LABS:                        7.1    3.81  )-----------( 91       ( 2022 09:57 )             22.6     Hgb Trend: 7.1<--      129<L>  |  99  |  99<H>  ----------------------------<  374<H>  6.8<HH>   |  19<L>  |  2.19<H>    Ca    8.8      2022 09:57  Phos  5.7       Mg     1.90         TPro  6.2  /  Alb  3.5  /  TBili  0.6  /  DBili  x   /  AST  85<H>  /  ALT  112<H>  /  AlkPhos  385<H>      Creatinine Trend: 2.19<--  PT/INR - ( 2022 09:57 )   PT: 15.2 sec;   INR: 1.31 ratio         PTT - ( 2022 09:57 )  PTT:39.6 sec  Urinalysis Basic - ( 2022 09:57 )    Color: Yellow / Appearance: Slightly Turbid / S.013 / pH: x  Gluc: x / Ketone: Negative  / Bili: Negative / Urobili: <2 mg/dL   Blood: x / Protein: 100 mg/dL / Nitrite: Negative   Leuk Esterase: Negative / RBC: 1 /HPF / WBC 0-2 /HPF   Sq Epi: x / Non Sq Epi: x / Bacteria: x        Venous Blood Gas:   @ 09:57  7.28/40/54/19/81.9  VBG Lactate: 1.4

## 2022-11-25 NOTE — CONSULT NOTE ADULT - ASSESSMENT
#AMS, Hypotension, Hypothermia, Bradycardia w/ Metabolic derrangements c/f Myxedema coma vs AI?   #Hypothyroidism    vitals notable for severe bradycardia to 30s, hypothermia to 88F, hypotension requiring pressor support   TSH 40 elevated, FT4 1.2 wnl w/ TT3 mildly low at 79   hyponatremic to 129, hyperkalemic to 6.8   s/p HC 100mg IV x 1 dose in ED   Recommendations:     #DM2, uncontrolled   A1c 9.2% in September 2022  per med rec on prandin 0.5mg   Recommendations:   - check a1c   - Goal -180  - FS Blood glucose monitoring q6h  - Hypoglycemia protocol   - Carb Consistent Diet     #Hx of HTN  Recommendations:   - defer to ccu team   - at home on amlodipine & labetalol per med rec   - outpt BP goal < 130/80   - outpatient annual urinary microalb/cr ratio    #HLD  Recommendations:   - LDL goal < 70   - at home on atorvastatin 40mg per med rec  - outpatient fasting lipid profile     Mariaelena Arechiga DO   Endocrine Fellow  For follow up questions, discharge recommendations, or new consults please call answering service at [phone] (weekdays), [phone] (nights/weekends). For nonurgent matters, please email [email or web address] or [email or web address]    57 year old female with pmh of HTN, DM, pHTN, CKD presenting with dizziness, found to be profoundly bradycardic to the 30s, also noted to have a potassium of 6.9 on inital laboratory work. Patient admitted to MICU for management of hemodynamic instability. Endocrine consulted due to concern for myxemda.     #AMS, Hypotension, Hypothermia, Bradycardia w/ Metabolic derrangements c/f Myxedema coma   #Hypothyroidism  vitals notable for severe bradycardia to 30s, hypothermia to 88F, hypotension requiring pressor support   TSH 40 elevated, FT4 1.2 wnl w/ TT3 mildly low at 79 however clinical picture consistent with myxedema; myxedema coma score >60, around 110  hyponatremic to 129, hyperkalemic to 6.8   s/p Hydrocortisone 100mg IV x 1 dose in ED   Recommendations:   - Start Levothyroxine 100mcg IV daily (slightly higher than weight based LT4 dose)  - close hemodynamic monitoring     #DM2, uncontrolled   A1c 9.2% in September 2022  per med rec on prandin 0.5mg   Recommendations:   - check a1c   - Goal -180  - FS Blood glucose monitoring q6h  - Start Lantus 16 units QHS   - Start Admelog Low Dose Correction Scale [id number] (change to tidac/hs once back on po diet)   - Hypoglycemia protocol   - Carb Consistent Diet when resumed on po diet   - Once patient is back on po diet & eating, start Admelog 6 units tidac - hold if not eating     #Hx of HTN  Recommendations:   - defer to ccu team   - at home on amlodipine & labetalol per med rec   - outpt BP goal < 130/80   - outpatient annual urinary microalb/cr ratio    #HLD  Recommendations:   - LDL goal < 70   - at home on atorvastatin 40mg per med rec  - outpatient fasting lipid profile     Mariaelena Arechiga DO   Endocrine Fellow  For follow up questions, discharge recommendations, or new consults please call answering service at [phone] (weekdays), [phone] (nights/weekends). For nonurgent matters, please email [email or web address] or [email or web address]

## 2022-11-25 NOTE — H&P ADULT - HISTORY OF PRESENT ILLNESS
56 yo F with PMH of HTN, HLD, DM2, CKD, pulm HTN, hypothyroidism presenting with dizziness and found to be bradycardic to the 30s, was transcutanously paced by EMS, also found to be hyperkalemic in the ED. Patient was sedated with versed by EMS, prior was AOx4, is now lethargic but arousible.

## 2022-11-25 NOTE — H&P ADULT - ATTENDING COMMENTS
57 F with htn, CKD, pulm htn, HFpEF here with dizziness, found to have sinus bradycardia of unclear etiology, possibly myxedema coma    Patient called EMS at home for dizziness, found to have bradycardia and was given versed prior to EMS pacing her  Since then, she has had AMS; did not improve with flumazenil  TSH elevated, free T4 wnl but given clinical status, being treated for myxedema coma with synthroid  cortisol was elevated but this was drawn after solucortef was given, will likely need to continue steroids, will d/w endocrine  on dopamine for bradycardia, titrate to HR of 60 or MAP of 60  ISAMAR on CKD - hyperkalemia, will give lokelma and trend labs, uop  will start abx for possible aspiration pneumonia, f/u cultures  given pulm htn, will eventually need RHC    lvsf normal  will start dvt ppx if plt improves  full code per chart    Critically ill patient requiring frequent bedside visits with therapy changes.

## 2022-11-25 NOTE — ED PROVIDER NOTE - PROGRESS NOTE DETAILS
Carlos Bae, PGY-3- MICU called due to concern this more metabolic as opposed to cardiac. CMP partially resulted. Awaiting potassium, chloride, sodium, anion gap. Pt empirically given insulin and d50 for suspected hyperkalemia. K elevated on vbg, unknown if hemolysis. Carlos Bae, PGY-3- CCU at bedside, requesting Dopamine infusion. Do not recommend pacing at this time or other pressors. Carlos Bae, PGY-3- Concern that Versed is not being cleared efficiently due to hx of CKD. Toxicology recommending Flumazenil 0.1 mg to see if it helps. Carlos Bell, PGY-3- Potassium 6.9 not hemolyzed. Will order Lasix 80 mg. Pt on Torsemide 40 mg at home. Has faye in place. Poor urine out put thus far. MICU to evaluate. Carlos Bae, PGY-3- pt evaluated by MICU and accepted to unit. Mental status improving. Updated patient's daughter that she will need ICU.

## 2022-11-25 NOTE — ED PROVIDER NOTE - CLINICAL SUMMARY MEDICAL DECISION MAKING FREE TEXT BOX
Carlos Bae, PGY-3- 57 year old female with a pmhx of HTN, CKD, DM, pHTN, recently here for hyperkalemia, hypertensive urgency, and ISAMAR, BIBEMS for eval of dizziness. Found to be bradycardic to 30 on arrival. Arrives transcutaneously paced s/p 5 mg Midazolam for sedation. Responsive to painful stimuli. Finger stick 300s. BP stable. Received Calcium 2 g empirically for possible hyperkalemia. Unclear etiology of bradycardia, possibly medication SE, thyroid dysfunction, hyperkalemia and renal failure. Plan to continue transcutaneously pacing and await lab results for possible treatable cause. If not, may need TVP

## 2022-11-25 NOTE — PATIENT PROFILE ADULT - FALL HARM RISK - HARM RISK INTERVENTIONS

## 2022-11-25 NOTE — ED PROVIDER NOTE - PHYSICAL EXAMINATION
General: responsive to painful stimuli, appears to be sleeping   Skin: no rash, no pallor  Head: normocephalic, atraumatic  Eyes: clear conjunctiva, PERRL   ENMT: airway patent, no nasal discharge  Cardiovascular: transcutaneously paced, bradycardic, S1/S2, 2+ pitting edema thigh   Pulmonary: clear to auscultation bilaterally, no rales, rhonchi, or wheeze  Abdomen: soft, distended   Musculoskeletal: edema, no deformity  Psych: unable to assess, sleeping

## 2022-11-26 LAB
ALBUMIN SERPL ELPH-MCNC: 3.7 G/DL — SIGNIFICANT CHANGE UP (ref 3.3–5)
ALP SERPL-CCNC: 373 U/L — HIGH (ref 40–120)
ALT FLD-CCNC: 111 U/L — HIGH (ref 4–33)
ANION GAP SERPL CALC-SCNC: 16 MMOL/L — HIGH (ref 7–14)
AST SERPL-CCNC: 64 U/L — HIGH (ref 4–32)
BASE EXCESS BLDV CALC-SCNC: -6 MMOL/L — LOW (ref -2–3)
BASOPHILS # BLD AUTO: 0.01 K/UL — SIGNIFICANT CHANGE UP (ref 0–0.2)
BASOPHILS NFR BLD AUTO: 0.2 % — SIGNIFICANT CHANGE UP (ref 0–2)
BILIRUB SERPL-MCNC: 0.6 MG/DL — SIGNIFICANT CHANGE UP (ref 0.2–1.2)
BLOOD GAS VENOUS COMPREHENSIVE RESULT: SIGNIFICANT CHANGE UP
BUN SERPL-MCNC: 101 MG/DL — HIGH (ref 7–23)
CALCIUM SERPL-MCNC: 9.9 MG/DL — SIGNIFICANT CHANGE UP (ref 8.4–10.5)
CHLORIDE BLDV-SCNC: 100 MMOL/L — SIGNIFICANT CHANGE UP (ref 96–108)
CHLORIDE SERPL-SCNC: 97 MMOL/L — LOW (ref 98–107)
CO2 BLDV-SCNC: 20.8 MMOL/L — LOW (ref 22–26)
CO2 SERPL-SCNC: 19 MMOL/L — LOW (ref 22–31)
CREAT SERPL-MCNC: 2.17 MG/DL — HIGH (ref 0.5–1.3)
CULTURE RESULTS: NO GROWTH — SIGNIFICANT CHANGE UP
EGFR: 26 ML/MIN/1.73M2 — LOW
EOSINOPHIL # BLD AUTO: 0.01 K/UL — SIGNIFICANT CHANGE UP (ref 0–0.5)
EOSINOPHIL NFR BLD AUTO: 0.2 % — SIGNIFICANT CHANGE UP (ref 0–6)
GAS PNL BLDV: 129 MMOL/L — LOW (ref 136–145)
GLUCOSE BLDC GLUCOMTR-MCNC: 195 MG/DL — HIGH (ref 70–99)
GLUCOSE BLDC GLUCOMTR-MCNC: 287 MG/DL — HIGH (ref 70–99)
GLUCOSE BLDC GLUCOMTR-MCNC: 304 MG/DL — HIGH (ref 70–99)
GLUCOSE BLDV-MCNC: 222 MG/DL — HIGH (ref 70–99)
GLUCOSE SERPL-MCNC: 229 MG/DL — HIGH (ref 70–99)
HCO3 BLDV-SCNC: 20 MMOL/L — LOW (ref 22–29)
HCT VFR BLD CALC: 22.3 % — LOW (ref 34.5–45)
HCT VFR BLDA CALC: 22 % — LOW (ref 34.5–46.5)
HGB BLD CALC-MCNC: 7.3 G/DL — LOW (ref 11.5–15.5)
HGB BLD-MCNC: 7.2 G/DL — LOW (ref 11.5–15.5)
IANC: 4.53 K/UL — SIGNIFICANT CHANGE UP (ref 1.8–7.4)
IMM GRANULOCYTES NFR BLD AUTO: 1.3 % — HIGH (ref 0–0.9)
LACTATE BLDV-MCNC: 1 MMOL/L — SIGNIFICANT CHANGE UP (ref 0.5–2)
LYMPHOCYTES # BLD AUTO: 0.51 K/UL — LOW (ref 1–3.3)
LYMPHOCYTES # BLD AUTO: 9.5 % — LOW (ref 13–44)
MAGNESIUM SERPL-MCNC: 2.1 MG/DL — SIGNIFICANT CHANGE UP (ref 1.6–2.6)
MCHC RBC-ENTMCNC: 24.8 PG — LOW (ref 27–34)
MCHC RBC-ENTMCNC: 32.3 GM/DL — SIGNIFICANT CHANGE UP (ref 32–36)
MCV RBC AUTO: 76.9 FL — LOW (ref 80–100)
MONOCYTES # BLD AUTO: 0.23 K/UL — SIGNIFICANT CHANGE UP (ref 0–0.9)
MONOCYTES NFR BLD AUTO: 4.3 % — SIGNIFICANT CHANGE UP (ref 2–14)
MRSA PCR RESULT.: SIGNIFICANT CHANGE UP
NEUTROPHILS # BLD AUTO: 4.53 K/UL — SIGNIFICANT CHANGE UP (ref 1.8–7.4)
NEUTROPHILS NFR BLD AUTO: 84.5 % — HIGH (ref 43–77)
NRBC # BLD: 0 /100 WBCS — SIGNIFICANT CHANGE UP (ref 0–0)
NRBC # FLD: 0 K/UL — SIGNIFICANT CHANGE UP (ref 0–0)
PCO2 BLDV: 38 MMHG — LOW (ref 39–42)
PH BLDV: 7.32 — SIGNIFICANT CHANGE UP (ref 7.32–7.43)
PHOSPHATE SERPL-MCNC: 5.7 MG/DL — HIGH (ref 2.5–4.5)
PLATELET # BLD AUTO: 118 K/UL — LOW (ref 150–400)
PO2 BLDV: 83 MMHG — SIGNIFICANT CHANGE UP
POTASSIUM BLDV-SCNC: 5.5 MMOL/L — HIGH (ref 3.5–5.1)
POTASSIUM SERPL-MCNC: 5.4 MMOL/L — HIGH (ref 3.5–5.3)
POTASSIUM SERPL-SCNC: 5.4 MMOL/L — HIGH (ref 3.5–5.3)
PROT SERPL-MCNC: 6.4 G/DL — SIGNIFICANT CHANGE UP (ref 6–8.3)
RBC # BLD: 2.9 M/UL — LOW (ref 3.8–5.2)
RBC # FLD: 15.8 % — HIGH (ref 10.3–14.5)
S AUREUS DNA NOSE QL NAA+PROBE: DETECTED
SAO2 % BLDV: 96.1 % — SIGNIFICANT CHANGE UP
SODIUM SERPL-SCNC: 132 MMOL/L — LOW (ref 135–145)
SPECIMEN SOURCE: SIGNIFICANT CHANGE UP
WBC # BLD: 5.36 K/UL — SIGNIFICANT CHANGE UP (ref 3.8–10.5)
WBC # FLD AUTO: 5.36 K/UL — SIGNIFICANT CHANGE UP (ref 3.8–10.5)

## 2022-11-26 PROCEDURE — 93306 TTE W/DOPPLER COMPLETE: CPT | Mod: 26

## 2022-11-26 PROCEDURE — 99233 SBSQ HOSP IP/OBS HIGH 50: CPT | Mod: GC

## 2022-11-26 PROCEDURE — 99232 SBSQ HOSP IP/OBS MODERATE 35: CPT

## 2022-11-26 PROCEDURE — 99232 SBSQ HOSP IP/OBS MODERATE 35: CPT | Mod: GC

## 2022-11-26 RX ORDER — SODIUM ZIRCONIUM CYCLOSILICATE 10 G/10G
10 POWDER, FOR SUSPENSION ORAL ONCE
Refills: 0 | Status: COMPLETED | OUTPATIENT
Start: 2022-11-26 | End: 2022-11-26

## 2022-11-26 RX ORDER — INSULIN LISPRO 100/ML
6 VIAL (ML) SUBCUTANEOUS
Refills: 0 | Status: DISCONTINUED | OUTPATIENT
Start: 2022-11-26 | End: 2022-11-27

## 2022-11-26 RX ORDER — HYDROCORTISONE 20 MG
100 TABLET ORAL EVERY 8 HOURS
Refills: 0 | Status: DISCONTINUED | OUTPATIENT
Start: 2022-11-26 | End: 2022-11-26

## 2022-11-26 RX ORDER — HYDROCORTISONE 20 MG
50 TABLET ORAL EVERY 8 HOURS
Refills: 0 | Status: DISCONTINUED | OUTPATIENT
Start: 2022-11-26 | End: 2022-11-27

## 2022-11-26 RX ORDER — INSULIN LISPRO 100/ML
VIAL (ML) SUBCUTANEOUS AT BEDTIME
Refills: 0 | Status: DISCONTINUED | OUTPATIENT
Start: 2022-11-26 | End: 2022-11-29

## 2022-11-26 RX ORDER — HEPARIN SODIUM 5000 [USP'U]/ML
5000 INJECTION INTRAVENOUS; SUBCUTANEOUS EVERY 8 HOURS
Refills: 0 | Status: DISCONTINUED | OUTPATIENT
Start: 2022-11-26 | End: 2022-11-29

## 2022-11-26 RX ORDER — INSULIN LISPRO 100/ML
VIAL (ML) SUBCUTANEOUS
Refills: 0 | Status: DISCONTINUED | OUTPATIENT
Start: 2022-11-26 | End: 2022-11-29

## 2022-11-26 RX ORDER — FUROSEMIDE 40 MG
40 TABLET ORAL ONCE
Refills: 0 | Status: COMPLETED | OUTPATIENT
Start: 2022-11-26 | End: 2022-11-26

## 2022-11-26 RX ADMIN — INSULIN GLARGINE 16 UNIT(S): 100 INJECTION, SOLUTION SUBCUTANEOUS at 22:34

## 2022-11-26 RX ADMIN — Medication 6 UNIT(S): at 11:58

## 2022-11-26 RX ADMIN — SODIUM ZIRCONIUM CYCLOSILICATE 10 GRAM(S): 10 POWDER, FOR SUSPENSION ORAL at 06:03

## 2022-11-26 RX ADMIN — Medication 100 MICROGRAM(S): at 18:57

## 2022-11-26 RX ADMIN — PIPERACILLIN AND TAZOBACTAM 25 GRAM(S): 4; .5 INJECTION, POWDER, LYOPHILIZED, FOR SOLUTION INTRAVENOUS at 14:19

## 2022-11-26 RX ADMIN — CHLORHEXIDINE GLUCONATE 1 APPLICATION(S): 213 SOLUTION TOPICAL at 06:04

## 2022-11-26 RX ADMIN — Medication 1: at 22:35

## 2022-11-26 RX ADMIN — Medication 6 UNIT(S): at 18:35

## 2022-11-26 RX ADMIN — Medication 100 MILLIGRAM(S): at 14:18

## 2022-11-26 RX ADMIN — Medication 6 UNIT(S): at 07:33

## 2022-11-26 RX ADMIN — PIPERACILLIN AND TAZOBACTAM 25 GRAM(S): 4; .5 INJECTION, POWDER, LYOPHILIZED, FOR SOLUTION INTRAVENOUS at 22:36

## 2022-11-26 RX ADMIN — Medication 4: at 18:35

## 2022-11-26 RX ADMIN — Medication 3: at 11:58

## 2022-11-26 RX ADMIN — Medication 50 MILLIGRAM(S): at 22:32

## 2022-11-26 RX ADMIN — Medication 1: at 07:33

## 2022-11-26 RX ADMIN — HEPARIN SODIUM 5000 UNIT(S): 5000 INJECTION INTRAVENOUS; SUBCUTANEOUS at 14:18

## 2022-11-26 RX ADMIN — HEPARIN SODIUM 5000 UNIT(S): 5000 INJECTION INTRAVENOUS; SUBCUTANEOUS at 22:35

## 2022-11-26 RX ADMIN — Medication 100 MILLIGRAM(S): at 06:03

## 2022-11-26 RX ADMIN — Medication 40 MILLIGRAM(S): at 14:18

## 2022-11-26 NOTE — PROGRESS NOTE ADULT - ASSESSMENT
57yoF w/ PMHx HTN, HLD, DM, HFpEF, CKD presents after calling EMS c/o dizziness without syncope, found to have symptomatic bradycardia 2/2 myxedema, requiring dopamine briefly, now resolved    Symptomatic bradycardia 2/2 myxedema   Off Dopamine infusion. Treat underlying disorder. No e/o conduction disease     Horace Shea MD  Cardiology Fellow      Please check [email or web address]" for cardiology service schedule and contact information.

## 2022-11-26 NOTE — PROGRESS NOTE ADULT - SUBJECTIVE AND OBJECTIVE BOX
Significant recent/past 24 hr events:    Subjective:    Review of Systems         [ ] A ten-point review of systems was otherwise negative except as noted.  [ ] Due to altered mental status/intubation, subjective information were not able to be obtained from the patient. History was obtained, to the extent possible, from review of the chart and collateral sources of information.      Patient is a 57y old  Female who presents with a chief complaint of dizziness/bradycardia/hyperkalemia (2022 15:18)    HPI:  56 yo F with PMH of HTN, HLD, DM2, CKD, pulm HTN, hypothyroidism presenting with dizziness and found to be bradycardic to the 30s, was transcutanously paced by EMS, also found to be hyperkalemic in the ED. Patient was sedated with versed by EMS, prior was AOx4, is now lethargic but arousible.  (2022 13:42)    PAST MEDICAL & SURGICAL HISTORY:  HTN (hypertension)      HLD (hyperlipidemia)      DM (diabetes mellitus)      Hypothyroid      H/O pulmonary hypertension      CKD (chronic kidney disease)      No significant past surgical history        FAMILY HISTORY:      Vitals   ICU Vital Signs Last 24 Hrs  T(C): 36.3 (2022 06:00), Max: 37 (2022 10:32)  T(F): 97.4 (2022 06:00), Max: 98.6 (2022 10:32)  HR: 59 (2022 06:00) (31 - 70)  BP: 136/63 (2022 06:00) (74/47 - 165/68)  BP(mean): 81 (2022 06:00) (57 - 138)  ABP: --  ABP(mean): --  RR: 12 (2022 06:00) (9 - 28)  SpO2: 100% (2022 06:00) (97% - 100%)    O2 Parameters below as of 2022 13:21  Patient On (Oxygen Delivery Method): nasal cannula  O2 Flow (L/min): 3          Physical Exam:   Constitutional: NAD, well-groomed, well-developed  HEENT: PERRLA, EOMI, no drainage or redness  Neck: supple,  No JVD, Trachea midline  Back: Normal spine flexure, No CVA tenderness, No deformity or limitation of movement  Respiratory: Breath Sounds equal & clear bilaterally to auscultation, no accessory muscle use noted  Cardiovascular: Regular rate, regular rhythm, normal S1, S2; no murmurs or rub  Gastrointestinal: Soft, non-tender, non distended, no hepatosplenomegaly, normal bowel sounds  Extremities: MARTINEZ x 4, no peripheral edema, no cyanosis, no clubbing   Vascular: Equal and normal pulses: 2+ peripheral pulses throughout  Neurological: A+O x 3; speech clear and intact; no sensory, motor  deficits, normal reflexes  Psychiatric: calm, normal mood, normal affect  Musculoskeletal: No joint swelling or deformity; no limitation of movement  Skin: warm, dry, well perfused, no rashes    VENT SETTINGS     ABG - ( 2022 14:55 )  pH, Arterial: 7.30  pH, Blood: x     /  pCO2: 44    /  pO2: 155   / HCO3: 22    / Base Excess: -4.5  /  SaO2: 96.6                I&O's Detail    2022 07:01  -  2022 07:00  --------------------------------------------------------  IN:    DOPamine Infusion: 36.9 mL    DOPamine Infusion: 5 mL    DOPamine Infusion: 25.6 mL    Enteral Tube Flush: 100 mL    IV PiggyBack: 150 mL  Total IN: 317.5 mL    OUT:    Indwelling Catheter - Urethral (mL): 2175 mL    Other (mL): 150 mL  Total OUT: 2325 mL    Total NET: -2007.5 mL          LABS                        7.2    5.36  )-----------( 118      ( 2022 02:00 )             22.3     11-26    132<L>  |  97<L>  |  101<H>  ----------------------------<  229<H>  5.4<H>   |  19<L>  |  2.17<H>    Ca    9.9      2022 02:00  Phos  5.7     11-  Mg     2.10     -    TPro  6.4  /  Alb  3.7  /  TBili  0.6  /  DBili  x   /  AST  64<H>  /  ALT  111<H>  /  AlkPhos  373<H>      LIVER FUNCTIONS - ( 2022 02:00 )  Alb: 3.7 g/dL / Pro: 6.4 g/dL / ALK PHOS: 373 U/L / ALT: 111 U/L / AST: 64 U/L / GGT: x           PT/INR - ( 2022 09:57 )   PT: 15.2 sec;   INR: 1.31 ratio         PTT - ( 2022 09:57 )  PTT:39.6 sec  CARDIAC MARKERS ( 2022 21:00 )   U/L / CKMBx     / Troponin Tx     /        Urinalysis Basic - ( 2022 09:57 )    Color: Yellow / Appearance: Slightly Turbid / S.013 / pH: x  Gluc: x / Ketone: Negative  / Bili: Negative / Urobili: <2 mg/dL   Blood: x / Protein: 100 mg/dL / Nitrite: Negative   Leuk Esterase: Negative / RBC: 1 /HPF / WBC 0-2 /HPF   Sq Epi: x / Non Sq Epi: x / Bacteria: x      POCT Blood Glucose.: 195 mg/dL *H* (22 @ 07:24)  POCT Blood Glucose.: 188 mg/dL *H* (22 @ 23:17)  POCT Blood Glucose.: 112 mg/dL *H* (22 @ 22:37)  POCT Blood Glucose.: 209 mg/dL *H* (22 @ 17:02)  POCT Blood Glucose.: 148 mg/dL *H* (22 @ 13:42)  POCT Blood Glucose.: 463 mg/dL *HH* (22 @ 11:07)  POCT Blood Glucose.: 381 mg/dL *H* (22 @ 10:37)  POCT Blood Glucose.: 385 mg/dL *H* (22 @ 09:52)        MEDICATIONS  (STANDING):  chlorhexidine 2% Cloths 1 Application(s) Topical <User Schedule>  dextrose 5%. 1000 milliLiter(s) (50 mL/Hr) IV Continuous <Continuous>  dextrose 5%. 1000 milliLiter(s) (100 mL/Hr) IV Continuous <Continuous>  dextrose 50% Injectable 25 Gram(s) IV Push once  dextrose 50% Injectable 12.5 Gram(s) IV Push once  dextrose 50% Injectable 25 Gram(s) IV Push once  glucagon  Injectable 1 milliGRAM(s) IntraMuscular once  heparin   Injectable 5000 Unit(s) SubCutaneous every 8 hours  hydrocortisone sodium succinate Injectable 100 milliGRAM(s) IV Push every 8 hours  insulin glargine Injectable (LANTUS) 16 Unit(s) SubCutaneous at bedtime  insulin lispro (ADMELOG) corrective regimen sliding scale   SubCutaneous three times a day before meals  insulin lispro (ADMELOG) corrective regimen sliding scale   SubCutaneous at bedtime  insulin lispro Injectable (ADMELOG) 6 Unit(s) SubCutaneous three times a day before meals  levothyroxine Injectable 100 MICROGram(s) IV Push <User Schedule>  piperacillin/tazobactam IVPB.. 3.375 Gram(s) IV Intermittent every 8 hours    MEDICATIONS  (PRN):  dextrose Oral Gel 15 Gram(s) Oral once PRN Blood Glucose LESS THAN 70 milliGRAM(s)/deciliter      Allergies:  No Known Allergies        CRITICAL CARE TIME SPENT:  minutes of critical care time spent providing medical care for patient's acute illness/conditions that impairs at least one vital organ system and/or poses a high risk of imminent or life threatening deterioration in the patient's condition. It includes time spent evaluating and treating the patient's acute illness as well as time spent reviewing labs, radiology, discussing goals of care with patient and/or patient's family, and discussing the case with a multidisciplinary team, in an effort to prevent further life threatening deterioration or end organ damage. This time is independent of any procedures performed.       Significant recent/past 24 hr events: Weaned off dopamine gtt.     Subjective: The patient is AOx3, feels better.     Review of Systems         [ ] A ten-point review of systems was otherwise negative except as noted.  [ ] Due to altered mental status/intubation, subjective information were not able to be obtained from the patient. History was obtained, to the extent possible, from review of the chart and collateral sources of information.      Patient is a 57y old  Female who presents with a chief complaint of dizziness/bradycardia/hyperkalemia (2022 15:18)    HPI:  58 yo F with PMH of HTN, HLD, DM2, CKD, pulm HTN, hypothyroidism presenting with dizziness and found to be bradycardic to the 30s, was transcutanously paced by EMS, also found to be hyperkalemic in the ED. Patient was sedated with versed by EMS, prior was AOx4, is now lethargic but arousible.  (2022 13:42)    PAST MEDICAL & SURGICAL HISTORY:  HTN (hypertension)      HLD (hyperlipidemia)      DM (diabetes mellitus)      Hypothyroid      H/O pulmonary hypertension      CKD (chronic kidney disease)      No significant past surgical history        FAMILY HISTORY:      Vitals   ICU Vital Signs Last 24 Hrs  T(C): 36.3 (2022 06:00), Max: 37 (2022 10:32)  T(F): 97.4 (2022 06:00), Max: 98.6 (2022 10:32)  HR: 59 (2022 06:00) (31 - 70)  BP: 136/63 (2022 06:00) (74/47 - 165/68)  BP(mean): 81 (2022 06:00) (57 - 138)  ABP: --  ABP(mean): --  RR: 12 (2022 06:00) (9 - 28)  SpO2: 100% (2022 06:00) (97% - 100%)    O2 Parameters below as of 2022 13:21  Patient On (Oxygen Delivery Method): nasal cannula  O2 Flow (L/min): 3          Physical Exam:   Constitutional: NAD, well-groomed, well-developed  HEENT: PERRLA, EOMI, no drainage or redness  Neck: supple,  No JVD, Trachea midline  Back: Normal spine flexure, No CVA tenderness, No deformity or limitation of movement  Respiratory: Breath Sounds equal & clear bilaterally to auscultation, no accessory muscle use noted  Cardiovascular: Regular rate, regular rhythm, normal S1, S2; no murmurs or rub  Gastrointestinal: Soft, non-tender, non distended, no hepatosplenomegaly, normal bowel sounds  Extremities: MARTINEZ x 4, no peripheral edema, no cyanosis, no clubbing   Vascular: Equal and normal pulses: 2+ peripheral pulses throughout  Neurological: A+O x 3; speech clear and intact; no sensory, motor  deficits, normal reflexes  Psychiatric: calm, normal mood, normal affect  Musculoskeletal: No joint swelling or deformity; no limitation of movement  Skin: warm, dry, well perfused, no rashes    VENT SETTINGS     ABG - ( 2022 14:55 )  pH, Arterial: 7.30  pH, Blood: x     /  pCO2: 44    /  pO2: 155   / HCO3: 22    / Base Excess: -4.5  /  SaO2: 96.6                I&O's Detail    2022 07:01  -  2022 07:00  --------------------------------------------------------  IN:    DOPamine Infusion: 36.9 mL    DOPamine Infusion: 5 mL    DOPamine Infusion: 25.6 mL    Enteral Tube Flush: 100 mL    IV PiggyBack: 150 mL  Total IN: 317.5 mL    OUT:    Indwelling Catheter - Urethral (mL): 2175 mL    Other (mL): 150 mL  Total OUT: 2325 mL    Total NET: -2007.5 mL          LABS                        7.2    5.36  )-----------( 118      ( 2022 02:00 )             22.3     11-    132<L>  |  97<L>  |  101<H>  ----------------------------<  229<H>  5.4<H>   |  19<L>  |  2.17<H>    Ca    9.9      2022 02:00  Phos  5.7       Mg     2.10         TPro  6.4  /  Alb  3.7  /  TBili  0.6  /  DBili  x   /  AST  64<H>  /  ALT  111<H>  /  AlkPhos  373<H>      LIVER FUNCTIONS - ( 2022 02:00 )  Alb: 3.7 g/dL / Pro: 6.4 g/dL / ALK PHOS: 373 U/L / ALT: 111 U/L / AST: 64 U/L / GGT: x           PT/INR - ( 2022 09:57 )   PT: 15.2 sec;   INR: 1.31 ratio         PTT - ( 2022 09:57 )  PTT:39.6 sec  CARDIAC MARKERS ( 2022 21:00 )   U/L / CKMBx     / Troponin Tx     /        Urinalysis Basic - ( 2022 09:57 )    Color: Yellow / Appearance: Slightly Turbid / S.013 / pH: x  Gluc: x / Ketone: Negative  / Bili: Negative / Urobili: <2 mg/dL   Blood: x / Protein: 100 mg/dL / Nitrite: Negative   Leuk Esterase: Negative / RBC: 1 /HPF / WBC 0-2 /HPF   Sq Epi: x / Non Sq Epi: x / Bacteria: x      POCT Blood Glucose.: 195 mg/dL *H* (22 @ 07:24)  POCT Blood Glucose.: 188 mg/dL *H* (22 @ 23:17)  POCT Blood Glucose.: 112 mg/dL *H* (22 @ 22:37)  POCT Blood Glucose.: 209 mg/dL *H* (22 @ 17:02)  POCT Blood Glucose.: 148 mg/dL *H* (22 @ 13:42)  POCT Blood Glucose.: 463 mg/dL *HH* (22 @ 11:07)  POCT Blood Glucose.: 381 mg/dL *H* (22 @ 10:37)  POCT Blood Glucose.: 385 mg/dL *H* (22 @ 09:52)        MEDICATIONS  (STANDING):  chlorhexidine 2% Cloths 1 Application(s) Topical <User Schedule>  dextrose 5%. 1000 milliLiter(s) (50 mL/Hr) IV Continuous <Continuous>  dextrose 5%. 1000 milliLiter(s) (100 mL/Hr) IV Continuous <Continuous>  dextrose 50% Injectable 25 Gram(s) IV Push once  dextrose 50% Injectable 12.5 Gram(s) IV Push once  dextrose 50% Injectable 25 Gram(s) IV Push once  glucagon  Injectable 1 milliGRAM(s) IntraMuscular once  heparin   Injectable 5000 Unit(s) SubCutaneous every 8 hours  hydrocortisone sodium succinate Injectable 100 milliGRAM(s) IV Push every 8 hours  insulin glargine Injectable (LANTUS) 16 Unit(s) SubCutaneous at bedtime  insulin lispro (ADMELOG) corrective regimen sliding scale   SubCutaneous three times a day before meals  insulin lispro (ADMELOG) corrective regimen sliding scale   SubCutaneous at bedtime  insulin lispro Injectable (ADMELOG) 6 Unit(s) SubCutaneous three times a day before meals  levothyroxine Injectable 100 MICROGram(s) IV Push <User Schedule>  piperacillin/tazobactam IVPB.. 3.375 Gram(s) IV Intermittent every 8 hours    MEDICATIONS  (PRN):  dextrose Oral Gel 15 Gram(s) Oral once PRN Blood Glucose LESS THAN 70 milliGRAM(s)/deciliter      Allergies:  No Known Allergies        CRITICAL CARE TIME SPENT:  minutes of critical care time spent providing medical care for patient's acute illness/conditions that impairs at least one vital organ system and/or poses a high risk of imminent or life threatening deterioration in the patient's condition. It includes time spent evaluating and treating the patient's acute illness as well as time spent reviewing labs, radiology, discussing goals of care with patient and/or patient's family, and discussing the case with a multidisciplinary team, in an effort to prevent further life threatening deterioration or end organ damage. This time is independent of any procedures performed.

## 2022-11-26 NOTE — PHYSICAL THERAPY INITIAL EVALUATION ADULT - GAIT DEVIATIONS NOTED, PT EVAL
decreased hermann/decreased velocity of limb motion/decreased step length/decreased stride length/decreased weight-shifting ability

## 2022-11-26 NOTE — PHYSICAL THERAPY INITIAL EVALUATION ADULT - ADDITIONAL COMMENTS
Pt lives in a private house with her  and children; there are 5 steps to enter and ~9 steps inside to negotiate.

## 2022-11-26 NOTE — PHYSICAL THERAPY INITIAL EVALUATION ADULT - PERTINENT HX OF CURRENT PROBLEM, REHAB EVAL
Pt is a 57 year old female presenting with dizziness. Found to be profoundly bradycardiac; transcutaneously paced by EMS. In ED, pt also found to be hyperkalemic, hypothermic, hyponatremic, lethargic and not arousable. Patient admitted to MICU for management of hemodynamic instability.

## 2022-11-26 NOTE — PROGRESS NOTE ADULT - ASSESSMENT
57 year old female with pmh of HTN, DM, pHTN, CKD presenting with dizziness, found to be profoundly bradycardic to the 30s, also noted to have a potassium of 6.9 on inital laboratory work. Patient admitted to MICU for management of hemodynamic instability. Endocrine consulted due to concern for myxemda.     #AMS, Hypotension, Hypothermia, Bradycardia w/ Metabolic derrangements c/f Myxedema coma   #Hypothyroidism  vitals notable for severe bradycardia to 30s, hypothermia to 88F, hypotension requiring pressor support   TSH 40 elevated, FT4 1.2 wnl w/ TT3 mildly low at 79 however clinical picture consistent with myxedema; myxedema coma score >60, around 110  hyponatremic to 129, hyperkalemic to 6.8   s/p Hydrocortisone 100mg IV x 1 dose in ED   primary team unable to determine if patient had the serum cortisol drawn before or after the hydrocortisone dose was given  therefore continued on hydrocortisone 100mg every 8 hours  please decrease hydrocortisone to 50mg every 8 hours  Recommendations:   - Start Levothyroxine 100mcg IV daily (slightly higher than weight based LT4 dose)  - close hemodynamic monitoring     #DM2, uncontrolled   A1c 9.2% in September 2022  per med rec on prandin 0.5mg   Recommendations:   - check a1c   - Goal -180  - FS Blood glucose monitoring q6h  - Start Lantus 16 units QHS   - Start Admelog Low Dose Correction Scale [id number] (change to tidac/hs once back on po diet)   - Hypoglycemia protocol   - Carb Consistent Diet when resumed on po diet   - Once patient is back on po diet & eating, start Admelog 6 units tidac - hold if not eating     #Hx of HTN  Recommendations:   - defer to ccu team   - at home on amlodipine & labetalol per med rec   - outpt BP goal < 130/80   - outpatient annual urinary microalb/cr ratio    #HLD  Recommendations:   - LDL goal < 70   - at home on atorvastatin 40mg per med rec  - outpatient fasting lipid profile     Case discussed with Dr. Jayy Rojas MD  Endocrine Fellow  Can be reached via teams. For follow up questions, discharge recommendations, or new consults, please call answering service at [phone] (weekdays); [phone] (nights/weekends) 57 year old female with pmh of HTN, DM, pHTN, CKD presenting with dizziness, found to be profoundly bradycardic to the 30s, also noted to have a potassium of 6.9 on inital laboratory work. Patient admitted to MICU for management of hemodynamic instability. Endocrine consulted due to concern for myxemda.     #AMS, Hypotension, Hypothermia, Bradycardia w/ Metabolic derrangements c/f Myxedema coma   #Hypothyroidism  vitals notable for severe bradycardia to 30s, hypothermia to 88F, hypotension requiring pressor support   TSH 40 elevated, FT4 1.2 wnl w/ TT3 mildly low at 79 however clinical picture consistent with myxedema; myxedema coma score >60, around 110  hyponatremic to 129, hyperkalemic to 6.8   s/p Hydrocortisone 100mg IV x 1 dose in ED   primary team unable to determine if patient had the serum cortisol drawn before or after the hydrocortisone dose was given  therefore continued on hydrocortisone 100mg every 8 hours  please decrease hydrocortisone to 50mg every 8 hours  Recommendations:   - Start Levothyroxine 100mcg IV daily (slightly higher than weight based LT4 dose)  - close hemodynamic monitoring     #DM2, uncontrolled   A1c 9.2% in September 2022  per med rec on prandin 0.5mg   Recommendations:   - check a1c   - Goal -180  - FS Blood glucose monitoring q6h  - Start Lantus 16 units QHS   - Start Admelog Low Dose Correction Scale [id number] (change to tidac/hs once back on po diet)   - Hypoglycemia protocol   - Carb Consistent Diet when resumed on po diet   - Once patient is back on po diet & eating, start Admelog 6 units tidac - hold if not eating     #Hx of HTN  Recommendations:   - defer to ccu team   - at home on amlodipine & labetalol per med rec   - outpt BP goal < 130/80   - outpatient annual urinary microalb/cr ratio    #HLD  Recommendations:   - LDL goal < 70   - at home on atorvastatin 40mg per med rec  - outpatient fasting lipid profile     Case discussed with Dr. Jayy Rojas MD  Endocrine Fellow  Can be reached via teams. For follow up questions, discharge recommendations, or new consults, please call answering service at [phone] (weekdays); [phone] (nights/weekends)

## 2022-11-26 NOTE — PROGRESS NOTE ADULT - SUBJECTIVE AND OBJECTIVE BOX
Admitted for: Hyperkalemia        Following for: hypothyroidism    Subjective:   PAtient is awake and eating. She is confused about who she got to the ICU only states she remembers waiting for the ambulance. She cant tell me how long she has had hypothyroidism but states she took her synthroid most mornings, and misses it about 1-2 times per week    MEDICATIONS  (STANDING):  chlorhexidine 2% Cloths 1 Application(s) Topical <User Schedule>  dextrose 5%. 1000 milliLiter(s) (50 mL/Hr) IV Continuous <Continuous>  dextrose 5%. 1000 milliLiter(s) (100 mL/Hr) IV Continuous <Continuous>  dextrose 50% Injectable 25 Gram(s) IV Push once  dextrose 50% Injectable 12.5 Gram(s) IV Push once  dextrose 50% Injectable 25 Gram(s) IV Push once  furosemide   Injectable 40 milliGRAM(s) IV Push once  glucagon  Injectable 1 milliGRAM(s) IntraMuscular once  heparin   Injectable 5000 Unit(s) SubCutaneous every 8 hours  hydrocortisone sodium succinate Injectable 100 milliGRAM(s) IV Push every 8 hours  insulin glargine Injectable (LANTUS) 16 Unit(s) SubCutaneous at bedtime  insulin lispro (ADMELOG) corrective regimen sliding scale   SubCutaneous three times a day before meals  insulin lispro (ADMELOG) corrective regimen sliding scale   SubCutaneous at bedtime  insulin lispro Injectable (ADMELOG) 6 Unit(s) SubCutaneous three times a day before meals  levothyroxine Injectable 100 MICROGram(s) IV Push <User Schedule>  piperacillin/tazobactam IVPB.. 3.375 Gram(s) IV Intermittent every 8 hours    MEDICATIONS  (PRN):  dextrose Oral Gel 15 Gram(s) Oral once PRN Blood Glucose LESS THAN 70 milliGRAM(s)/deciliter      Allergies    No Known Allergies    Intolerances          PHYSICAL EXAM:  VITALS: T(C): 34.4 (11-26-22 @ 10:30)  T(F): 94 (11-26-22 @ 10:30), Max: 98.2 (11-26-22 @ 08:00)  HR: 66 (11-26-22 @ 14:00) (39 - 68)  BP: 125/50 (11-26-22 @ 13:00) (113/72 - 165/68)  RR:  (9 - 28)  SpO2:  (97% - 100%)  Wt(kg): --  GENERAL: NAD  EYES: No proptosis, no lid lag, anicteric  RESPIRATORY: Clear to auscultation bilaterally  CARDIOVASCULAR: Regular rate and rhythm  GI: Soft, nontender, non distended  EXT: b/l feet without wounds, 2+ pulses  PSYCH: Alert and oriented x 3, reactive affect      A1C with Estimated Average Glucose Result: 9.7 % (11-25-22 @ 14:20)  A1C with Estimated Average Glucose Result: 9.1 % (09-29-22 @ 06:00)  A1C with Estimated Average Glucose Result: 9.2 % (09-28-22 @ 07:47)      POCT Blood Glucose.: 287 mg/dL (11-26-22 @ 11:26)  POCT Blood Glucose.: 195 mg/dL (11-26-22 @ 07:24)  POCT Blood Glucose.: 188 mg/dL (11-25-22 @ 23:17)  POCT Blood Glucose.: 112 mg/dL (11-25-22 @ 22:37)  POCT Blood Glucose.: 209 mg/dL (11-25-22 @ 17:02)  POCT Blood Glucose.: 148 mg/dL (11-25-22 @ 13:42)  POCT Blood Glucose.: 463 mg/dL (11-25-22 @ 11:07)  POCT Blood Glucose.: 381 mg/dL (11-25-22 @ 10:37)  POCT Blood Glucose.: 385 mg/dL (11-25-22 @ 09:52)      11-26    132<L>  |  97<L>  |  101<H>  ----------------------------<  229<H>  5.4<H>   |  19<L>  |  2.17<H>    eGFR: 26<L>    Ca    9.9      11-26  Mg     2.10     11-26  Phos  5.7     11-26    TPro  6.4  /  Alb  3.7  /  TBili  0.6  /  DBili  x   /  AST  64<H>  /  ALT  111<H>  /  AlkPhos  373<H>  11-26      Thyroid Function Tests:  11-25 @ 12:25 TSH -- FreeT4 1.2 T3 -- Anti TPO -- Anti Thyroglobulin Ab -- TSI --  11-25 @ 09:57 TSH 40.80 FreeT4 -- T3 79 Anti TPO -- Anti Thyroglobulin Ab -- TSI --

## 2022-11-26 NOTE — PROGRESS NOTE ADULT - SUBJECTIVE AND OBJECTIVE BOX
Interval History: No overnight events. Off Dopamine.     Medications:  chlorhexidine 2% Cloths 1 Application(s) Topical <User Schedule>  dextrose 5%. 1000 milliLiter(s) IV Continuous <Continuous>  dextrose 5%. 1000 milliLiter(s) IV Continuous <Continuous>  dextrose 50% Injectable 25 Gram(s) IV Push once  dextrose 50% Injectable 12.5 Gram(s) IV Push once  dextrose 50% Injectable 25 Gram(s) IV Push once  dextrose Oral Gel 15 Gram(s) Oral once PRN  glucagon  Injectable 1 milliGRAM(s) IntraMuscular once  heparin   Injectable 5000 Unit(s) SubCutaneous every 8 hours  hydrocortisone sodium succinate Injectable 100 milliGRAM(s) IV Push every 8 hours  insulin glargine Injectable (LANTUS) 16 Unit(s) SubCutaneous at bedtime  insulin lispro (ADMELOG) corrective regimen sliding scale   SubCutaneous three times a day before meals  insulin lispro (ADMELOG) corrective regimen sliding scale   SubCutaneous at bedtime  insulin lispro Injectable (ADMELOG) 6 Unit(s) SubCutaneous three times a day before meals  levothyroxine Injectable 100 MICROGram(s) IV Push <User Schedule>  piperacillin/tazobactam IVPB.. 3.375 Gram(s) IV Intermittent every 8 hours      Vitals:  T(C): 36.8 (22 @ 08:00), Max: 37 (22 @ 10:32)  HR: 67 (22 @ 09:00) (31 - 70)  BP: 153/63 (22 @ 09:00) (74/47 - 165/68)  BP(mean): 86 (22 @ 09:00) (57 - 138)  RR: 15 (22 @ 09:00) (9 - 28)  SpO2: 99% (22 @ 09:00) (97% - 100%)      Daily Height in cm: 152.4 (2022 13:21)    Daily Weight in k.2 (2022 06:00)    Weight (kg): 71 ( @ 13:21)    I&O's Summary    2022 07:01  -  2022 07:00  --------------------------------------------------------  IN: 317.5 mL / OUT: 2425 mL / NET: -2107.5 mL    2022 07:01  -  2022 09:41  --------------------------------------------------------  IN: 0 mL / OUT: 175 mL / NET: -175 mL        Physical Exam:  Appearance: No Acute Distress  HEENT: No JVD  Cardiovascular: Normal S1 S2, No murmurs/rubs/gallops  Respiratory: Clear to auscultation bilaterally  Gastrointestinal: Soft, Non-tender	  Skin: No cyanosis	  Neurologic: Non-focal  Extremities: No LE edema  Psychiatry: A & O x 3, Mood & affect appropriate    Labs:                        7.2    5.36  )-----------( 118      ( 2022 02:00 )             22.3         132<L>  |  97<L>  |  101<H>  ----------------------------<  229<H>  5.4<H>   |  19<L>  |  2.17<H>    Ca    9.9      2022 02:00  Phos  5.7       Mg     2.10         TPro  6.4  /  Alb  3.7  /  TBili  0.6  /  DBili  x   /  AST  64<H>  /  ALT  111<H>  /  AlkPhos  373<H>      PT/INR - ( 2022 09:57 )   PT: 15.2 sec;   INR: 1.31 ratio         PTT - ( 2022 09:57 )  PTT:39.6 sec  CARDIAC MARKERS ( 2022 21:00 )  x     / x     / 150 U/L / x     / x          Serum Pro-Brain Natriuretic Peptide: 1395 pg/mL ( @ 09:57)          Total Cholesterol: 107  LDL: --  HDL: 61  T      TELEMETRY: sinus ravinder      Interval History: No overnight events. Off Dopamine.     Medications:  chlorhexidine 2% Cloths 1 Application(s) Topical <User Schedule>  heparin   Injectable 5000 Unit(s) SubCutaneous every 8 hours  hydrocortisone sodium succinate Injectable 100 milliGRAM(s) IV Push every 8 hours  insulin glargine Injectable (LANTUS) 16 Unit(s) SubCutaneous at bedtime  insulin lispro (ADMELOG) corrective regimen sliding scale   SubCutaneous three times a day before meals  insulin lispro (ADMELOG) corrective regimen sliding scale   SubCutaneous at bedtime  insulin lispro Injectable (ADMELOG) 6 Unit(s) SubCutaneous three times a day before meals  levothyroxine Injectable 100 MICROGram(s) IV Push <User Schedule>  piperacillin/tazobactam IVPB.. 3.375 Gram(s) IV Intermittent every 8 hours    Vitals:  T(C): 36.8 (22 @ 08:00), Max: 37 (22 @ 10:32)  HR: 67 (22 @ 09:00) (31 - 70)  BP: 153/63 (22 @ 09:00) (74/47 - 165/68)  BP(mean): 86 (22 @ 09:00) (57 - 138)  RR: 15 (22 @ 09:00) (9 - 28)  SpO2: 99% (22 @ 09:00) (97% - 100%)    Daily Height in cm: 152.4 (2022 13:21)    Daily Weight in k.2 (2022 06:00)    Weight (kg): 71 ( @ 13:21)    I&O's Summary    2022 07:01  -  2022 07:00  --------------------------------------------------------  IN: 317.5 mL / OUT: 2425 mL / NET: -2107.5 mL    2022 07:01  -  2022 09:41  --------------------------------------------------------  IN: 0 mL / OUT: 175 mL / NET: -175 mL    Physical Exam:  Appearance: No Acute Distress  HEENT: No JVD  Cardiovascular: Normal S1 S2, No murmurs/rubs/gallops  Respiratory: Clear to auscultation bilaterally  Gastrointestinal: Soft, Non-tender	  Skin: No cyanosis	  Neurologic: Non-focal  Extremities: No LE edema  Psychiatry: A & O x 3, Mood & affect appropriate    Labs:                        7.2    5.36  )-----------( 118      ( 2022 02:00 )             22.3         132<L>  |  97<L>  |  101<H>  ----------------------------<  229<H>  5.4<H>   |  19<L>  |  2.17<H>    Ca    9.9      2022 02:00  Phos  5.7       Mg     2.10         TPro  6.4  /  Alb  3.7  /  TBili  0.6  /  DBili  x   /  AST  64<H>  /  ALT  111<H>  /  AlkPhos  373<H>      PT/INR - ( 2022 09:57 )   PT: 15.2 sec;   INR: 1.31 ratio    PTT - ( 2022 09:57 )  PTT:39.6 sec    CARDIAC MARKERS ( 2022 21:00 )  x     / x     / 150 U/L / x     / x        Serum Pro-Brain Natriuretic Peptide: 1395 pg/mL ( @ 09:57)    Total Cholesterol: 107  LDL: --  HDL: 61  T    TELEMETRY: sinus ravinder 40s-60s

## 2022-11-26 NOTE — PROGRESS NOTE ADULT - ASSESSMENT
Shila Hernandez is a 57 year old female with pmh of HTN, DM, pHTN, CKD presenting with dizziness, found to be profoundly bradycardic to the 30s, also noted to have a potassium of 6.9 on inital laboratory work. Patient admitted to MICU for management of hemodynamic instability and nephrology evaluation.     #Neuro  - AMS/lethargy- likely cardiac in origin i/s/o profound bradycardia vs 2/2 midazolam used prior to transcutaneous pacing     #Cardiovascular  - profound bradycardia- was transcutaneously paced by EMS  - Dopamine infusion     #Respiratory  -no active issues    #GI/Nutrition  - NPO in setting of AMS, if no resolution will re-evaluate nutrition     #/Renal  -CKD with hyperkalemia to 6.9 on initial labs without hemolysis. Awaiting repeat CMP, may require nephrology/HD    #Skin  -no active issues     #ID  - no active issues    #Endocrine  - history of hypothyroidism, macroglossia on exam- will check tsh/free t4     #Hematologic/DVT ppx  - anemia/thrombocytopenia- possible CKD contribution- will trend cbc's   - may be close to her baseline   - type/screen

## 2022-11-27 LAB
ALBUMIN SERPL ELPH-MCNC: 3.8 G/DL — SIGNIFICANT CHANGE UP (ref 3.3–5)
ALP SERPL-CCNC: 342 U/L — HIGH (ref 40–120)
ALT FLD-CCNC: 93 U/L — HIGH (ref 4–33)
ANION GAP SERPL CALC-SCNC: 14 MMOL/L — SIGNIFICANT CHANGE UP (ref 7–14)
AST SERPL-CCNC: 42 U/L — HIGH (ref 4–32)
BASOPHILS # BLD AUTO: 0.01 K/UL — SIGNIFICANT CHANGE UP (ref 0–0.2)
BASOPHILS NFR BLD AUTO: 0.1 % — SIGNIFICANT CHANGE UP (ref 0–2)
BILIRUB SERPL-MCNC: 0.4 MG/DL — SIGNIFICANT CHANGE UP (ref 0.2–1.2)
BUN SERPL-MCNC: 84 MG/DL — HIGH (ref 7–23)
CALCIUM SERPL-MCNC: 9.8 MG/DL — SIGNIFICANT CHANGE UP (ref 8.4–10.5)
CHLORIDE SERPL-SCNC: 99 MMOL/L — SIGNIFICANT CHANGE UP (ref 98–107)
CO2 SERPL-SCNC: 24 MMOL/L — SIGNIFICANT CHANGE UP (ref 22–31)
CREAT SERPL-MCNC: 2.01 MG/DL — HIGH (ref 0.5–1.3)
EGFR: 29 ML/MIN/1.73M2 — LOW
EOSINOPHIL # BLD AUTO: 0.01 K/UL — SIGNIFICANT CHANGE UP (ref 0–0.5)
EOSINOPHIL NFR BLD AUTO: 0.1 % — SIGNIFICANT CHANGE UP (ref 0–6)
GLUCOSE SERPL-MCNC: 204 MG/DL — HIGH (ref 70–99)
HCT VFR BLD CALC: 24.1 % — LOW (ref 34.5–45)
HGB BLD-MCNC: 7.8 G/DL — LOW (ref 11.5–15.5)
IANC: 7.87 K/UL — HIGH (ref 1.8–7.4)
IMM GRANULOCYTES NFR BLD AUTO: 1.4 % — HIGH (ref 0–0.9)
LYMPHOCYTES # BLD AUTO: 0.66 K/UL — LOW (ref 1–3.3)
LYMPHOCYTES # BLD AUTO: 7.3 % — LOW (ref 13–44)
MCHC RBC-ENTMCNC: 25.2 PG — LOW (ref 27–34)
MCHC RBC-ENTMCNC: 32.4 GM/DL — SIGNIFICANT CHANGE UP (ref 32–36)
MCV RBC AUTO: 77.7 FL — LOW (ref 80–100)
MONOCYTES # BLD AUTO: 0.35 K/UL — SIGNIFICANT CHANGE UP (ref 0–0.9)
MONOCYTES NFR BLD AUTO: 3.9 % — SIGNIFICANT CHANGE UP (ref 2–14)
NEUTROPHILS # BLD AUTO: 7.87 K/UL — HIGH (ref 1.8–7.4)
NEUTROPHILS NFR BLD AUTO: 87.2 % — HIGH (ref 43–77)
NRBC # BLD: 0 /100 WBCS — SIGNIFICANT CHANGE UP (ref 0–0)
NRBC # FLD: 0.02 K/UL — HIGH (ref 0–0)
PLATELET # BLD AUTO: 147 K/UL — LOW (ref 150–400)
POTASSIUM SERPL-MCNC: 3.9 MMOL/L — SIGNIFICANT CHANGE UP (ref 3.5–5.3)
POTASSIUM SERPL-SCNC: 3.9 MMOL/L — SIGNIFICANT CHANGE UP (ref 3.5–5.3)
PROT SERPL-MCNC: 7 G/DL — SIGNIFICANT CHANGE UP (ref 6–8.3)
RBC # BLD: 3.1 M/UL — LOW (ref 3.8–5.2)
RBC # FLD: 16.2 % — HIGH (ref 10.3–14.5)
SODIUM SERPL-SCNC: 137 MMOL/L — SIGNIFICANT CHANGE UP (ref 135–145)
WBC # BLD: 9.03 K/UL — SIGNIFICANT CHANGE UP (ref 3.8–10.5)
WBC # FLD AUTO: 9.03 K/UL — SIGNIFICANT CHANGE UP (ref 3.8–10.5)

## 2022-11-27 PROCEDURE — 99232 SBSQ HOSP IP/OBS MODERATE 35: CPT

## 2022-11-27 RX ORDER — LEVOTHYROXINE SODIUM 125 MCG
112 TABLET ORAL DAILY
Refills: 0 | Status: DISCONTINUED | OUTPATIENT
Start: 2022-11-28 | End: 2022-11-29

## 2022-11-27 RX ORDER — HYDRALAZINE HCL 50 MG
2.5 TABLET ORAL ONCE
Refills: 0 | Status: COMPLETED | OUTPATIENT
Start: 2022-11-27 | End: 2022-11-27

## 2022-11-27 RX ORDER — LEVOTHYROXINE SODIUM 125 MCG
100 TABLET ORAL
Refills: 0 | Status: COMPLETED | OUTPATIENT
Start: 2022-11-27 | End: 2022-11-27

## 2022-11-27 RX ORDER — INSULIN LISPRO 100/ML
9 VIAL (ML) SUBCUTANEOUS
Refills: 0 | Status: DISCONTINUED | OUTPATIENT
Start: 2022-11-27 | End: 2022-11-29

## 2022-11-27 RX ORDER — HYDROCORTISONE 20 MG
25 TABLET ORAL EVERY 8 HOURS
Refills: 0 | Status: DISCONTINUED | OUTPATIENT
Start: 2022-11-27 | End: 2022-11-28

## 2022-11-27 RX ORDER — MUPIROCIN 20 MG/G
1 OINTMENT TOPICAL
Refills: 0 | Status: DISCONTINUED | OUTPATIENT
Start: 2022-11-27 | End: 2022-11-29

## 2022-11-27 RX ADMIN — MUPIROCIN 1 APPLICATION(S): 20 OINTMENT TOPICAL at 22:26

## 2022-11-27 RX ADMIN — Medication 50 MILLIGRAM(S): at 06:05

## 2022-11-27 RX ADMIN — INSULIN GLARGINE 16 UNIT(S): 100 INJECTION, SOLUTION SUBCUTANEOUS at 22:47

## 2022-11-27 RX ADMIN — Medication 6 UNIT(S): at 08:54

## 2022-11-27 RX ADMIN — HEPARIN SODIUM 5000 UNIT(S): 5000 INJECTION INTRAVENOUS; SUBCUTANEOUS at 06:05

## 2022-11-27 RX ADMIN — Medication 25 MILLIGRAM(S): at 23:58

## 2022-11-27 RX ADMIN — CHLORHEXIDINE GLUCONATE 1 APPLICATION(S): 213 SOLUTION TOPICAL at 06:04

## 2022-11-27 RX ADMIN — PIPERACILLIN AND TAZOBACTAM 25 GRAM(S): 4; .5 INJECTION, POWDER, LYOPHILIZED, FOR SOLUTION INTRAVENOUS at 22:14

## 2022-11-27 RX ADMIN — Medication 9 UNIT(S): at 18:16

## 2022-11-27 RX ADMIN — PIPERACILLIN AND TAZOBACTAM 25 GRAM(S): 4; .5 INJECTION, POWDER, LYOPHILIZED, FOR SOLUTION INTRAVENOUS at 13:10

## 2022-11-27 RX ADMIN — Medication 50 MILLIGRAM(S): at 13:02

## 2022-11-27 RX ADMIN — HEPARIN SODIUM 5000 UNIT(S): 5000 INJECTION INTRAVENOUS; SUBCUTANEOUS at 22:13

## 2022-11-27 RX ADMIN — Medication 100 MICROGRAM(S): at 22:26

## 2022-11-27 RX ADMIN — Medication 2.5 MILLIGRAM(S): at 22:15

## 2022-11-27 RX ADMIN — Medication 3: at 18:16

## 2022-11-27 RX ADMIN — Medication 2: at 08:55

## 2022-11-27 RX ADMIN — Medication 4: at 13:00

## 2022-11-27 RX ADMIN — PIPERACILLIN AND TAZOBACTAM 25 GRAM(S): 4; .5 INJECTION, POWDER, LYOPHILIZED, FOR SOLUTION INTRAVENOUS at 06:05

## 2022-11-27 RX ADMIN — Medication 1: at 22:48

## 2022-11-27 RX ADMIN — HEPARIN SODIUM 5000 UNIT(S): 5000 INJECTION INTRAVENOUS; SUBCUTANEOUS at 13:01

## 2022-11-27 RX ADMIN — Medication 9 UNIT(S): at 13:01

## 2022-11-27 NOTE — PROGRESS NOTE ADULT - SUBJECTIVE AND OBJECTIVE BOX
Admitted for: Hyperkalemia        Following for: hypothyroidism    Subjective:   Patient reports she is feeling better. She reports multiple times that she wants to go home    MEDICATIONS  (STANDING):  chlorhexidine 2% Cloths 1 Application(s) Topical <User Schedule>  dextrose 5%. 1000 milliLiter(s) (100 mL/Hr) IV Continuous <Continuous>  dextrose 5%. 1000 milliLiter(s) (50 mL/Hr) IV Continuous <Continuous>  dextrose 50% Injectable 25 Gram(s) IV Push once  dextrose 50% Injectable 12.5 Gram(s) IV Push once  dextrose 50% Injectable 25 Gram(s) IV Push once  glucagon  Injectable 1 milliGRAM(s) IntraMuscular once  heparin   Injectable 5000 Unit(s) SubCutaneous every 8 hours  hydrocortisone sodium succinate Injectable 50 milliGRAM(s) IV Push every 8 hours  insulin glargine Injectable (LANTUS) 16 Unit(s) SubCutaneous at bedtime  insulin lispro (ADMELOG) corrective regimen sliding scale   SubCutaneous three times a day before meals  insulin lispro (ADMELOG) corrective regimen sliding scale   SubCutaneous at bedtime  insulin lispro Injectable (ADMELOG) 9 Unit(s) SubCutaneous three times a day before meals  levothyroxine Injectable 100 MICROGram(s) IV Push <User Schedule>  mupirocin 2% Ointment 1 Application(s) Topical two times a day  piperacillin/tazobactam IVPB.. 3.375 Gram(s) IV Intermittent every 8 hours    MEDICATIONS  (PRN):  dextrose Oral Gel 15 Gram(s) Oral once PRN Blood Glucose LESS THAN 70 milliGRAM(s)/deciliter      Allergies    No Known Allergies    Intolerances          PHYSICAL EXAM:  VITALS: T(C): 36.6 (11-27-22 @ 08:30)  T(F): 97.9 (11-27-22 @ 08:30), Max: 98.2 (11-26-22 @ 16:20)  HR: 71 (11-27-22 @ 08:30) (66 - 78)  BP: 167/81 (11-27-22 @ 08:30) (131/57 - 167/81)  RR:  (16 - 18)  SpO2:  (97% - 100%)  Wt(kg): --  GENERAL: NAD  EYES: No proptosis, no lid lag, anicteric  RESPIRATORY: Clear to auscultation bilaterally  CARDIOVASCULAR: Regular rate and rhythm  GI: Soft, nontender, non distended  EXT: b/l feet without wounds, 2+ pulses  PSYCH: Alert and oriented x 3, reactive affect      A1C with Estimated Average Glucose Result: 9.7 % (11-25-22 @ 14:20)  A1C with Estimated Average Glucose Result: 9.1 % (09-29-22 @ 06:00)  A1C with Estimated Average Glucose Result: 9.2 % (09-28-22 @ 07:47)      POCT Blood Glucose.: 329 mg/dL (11-27-22 @ 12:29)  POCT Blood Glucose.: 215 mg/dL (11-27-22 @ 08:37)  POCT Blood Glucose.: 291 mg/dL (11-26-22 @ 22:08)  POCT Blood Glucose.: 329 mg/dL (11-26-22 @ 17:49)  POCT Blood Glucose.: 304 mg/dL (11-26-22 @ 16:45)  POCT Blood Glucose.: 287 mg/dL (11-26-22 @ 11:26)  POCT Blood Glucose.: 195 mg/dL (11-26-22 @ 07:24)  POCT Blood Glucose.: 188 mg/dL (11-25-22 @ 23:17)  POCT Blood Glucose.: 112 mg/dL (11-25-22 @ 22:37)  POCT Blood Glucose.: 209 mg/dL (11-25-22 @ 17:02)  POCT Blood Glucose.: 148 mg/dL (11-25-22 @ 13:42)  POCT Blood Glucose.: 463 mg/dL (11-25-22 @ 11:07)  POCT Blood Glucose.: 381 mg/dL (11-25-22 @ 10:37)  POCT Blood Glucose.: 385 mg/dL (11-25-22 @ 09:52)      11-27    137  |  99  |  84<H>  ----------------------------<  204<H>  3.9   |  24  |  2.01<H>    eGFR: 29<L>    Ca    9.8      11-27  Mg     2.10     11-26  Phos  5.7     11-26    TPro  7.0  /  Alb  3.8  /  TBili  0.4  /  DBili  x   /  AST  42<H>  /  ALT  93<H>  /  AlkPhos  342<H>  11-27      Thyroid Function Tests:  11-25 @ 12:25 TSH -- FreeT4 1.2 T3 -- Anti TPO -- Anti Thyroglobulin Ab -- TSI --  11-25 @ 09:57 TSH 40.80 FreeT4 -- T3 79 Anti TPO -- Anti Thyroglobulin Ab -- TSI --

## 2022-11-27 NOTE — PROGRESS NOTE ADULT - ASSESSMENT
57 year old female with pmh of HTN, DM, pHTN, CKD presenting with dizziness, found to be profoundly bradycardic to the 30s, also noted to have a potassium of 6.9 on inital laboratory work. Patient admitted to MICU for management of hemodynamic instability. Endocrine consulted due to concern for myxemda.     #AMS, Hypotension, Hypothermia, Bradycardia w/ Metabolic derrangements c/f Myxedema coma   #Hypothyroidism  vitals notable for severe bradycardia to 30s, hypothermia to 88F, hypotension requiring pressor support   TSH 40 elevated, FT4 1.2 wnl w/ TT3 mildly low at 79 however clinical picture consistent with myxedema; myxedema coma score >60, around 110  hyponatremic to 129, hyperkalemic to 6.8   s/p Hydrocortisone 100mg IV x 1 dose in ED   primary team unable to determine if patient had the serum cortisol drawn before or after the hydrocortisone dose was given  therefore continued on hydrocortisone 100mg every 8 hours  please decrease hydrocortisone to 25mg every 8 hours  Recommendations:   - stop Levothyroxine 100mcg IV daily (slightly higher than weight based LT4 dose)  -switch to oral levothyroxine 112mcg daily  - close hemodynamic monitoring     #DM2, uncontrolled   A1c 9.2% in September 2022  per med rec on prandin 0.5mg   Recommendations:   - check a1c   - Goal -180  - FS Blood glucose monitoring q6h  - Start Lantus 16 units QHS   - Start Admelog Low Dose Correction Scale [id number] (change to tidac/hs once back on po diet)   - Hypoglycemia protocol   - Carb Consistent Diet when resumed on po diet   - Once patient is back on po diet & eating, start Admelog 6 units tidac - hold if not eating     #Hx of HTN  Recommendations:   - defer to ccu team   - at home on amlodipine & labetalol per med rec   - outpt BP goal < 130/80   - outpatient annual urinary microalb/cr ratio    #HLD  Recommendations:   - LDL goal < 70   - at home on atorvastatin 40mg per med rec  - outpatient fasting lipid profile     Case discussed with Dr. Jayy Rojas MD  Endocrine Fellow  Can be reached via teams. For follow up questions, discharge recommendations, or new consults, please call answering service at [phone] (weekdays); [phone] (nights/weekends) 57 year old female with pmh of HTN, DM, pHTN, CKD presenting with dizziness, found to be profoundly bradycardic to the 30s, also noted to have a potassium of 6.9 on inital laboratory work. Patient admitted to MICU for management of hemodynamic instability. Endocrine consulted due to concern for myxemda.     #AMS, Hypotension, Hypothermia, Bradycardia w/ Metabolic derrangements c/f Myxedema coma   #Hypothyroidism  vitals notable for severe bradycardia to 30s, hypothermia to 88F, hypotension requiring pressor support   TSH 40 elevated, FT4 1.2 wnl w/ TT3 mildly low at 79 however clinical picture consistent with myxedema; myxedema coma score >60, around 110  hyponatremic to 129, hyperkalemic to 6.8   s/p Hydrocortisone 100mg IV x 1 dose in ED   primary team unable to determine if patient had the serum cortisol drawn before or after the hydrocortisone dose was given  therefore continued on hydrocortisone 100mg every 8 hours  please decrease hydrocortisone to 25mg every 8 hours  -give hydrocortisone 25mg in the morning tomorrow only  -check AM cortisol on 11/29AM prior to discharge  Recommendations:   - stop Levothyroxine 100mcg IV daily (slightly higher than weight based LT4 dose)  -switch to oral levothyroxine 112mcg daily tomororw morning on an empty stomach  - close hemodynamic monitoring     #DM2, uncontrolled   A1c 9.2% in September 2022  per med rec on prandin 0.5mg   Recommendations:   - check a1c   - Goal -180  - FS Blood glucose monitoring q6h  - Start Lantus 16 units QHS   - Start Admelog Low Dose Correction Scale [id number] (change to tidac/hs once back on po diet)   - Hypoglycemia protocol   - Carb Consistent Diet when resumed on po diet   - Once patient is back on po diet & eating, start Admelog 6 units tidac - hold if not eating     #Hx of HTN  Recommendations:   - defer to ccu team   - at home on amlodipine & labetalol per med rec   - outpt BP goal < 130/80   - outpatient annual urinary microalb/cr ratio    #HLD  Recommendations:   - LDL goal < 70   - at home on atorvastatin 40mg per med rec  - outpatient fasting lipid profile     Case discussed with Dr. Jayy Rojas MD  Endocrine Fellow  Can be reached via teams. For follow up questions, discharge recommendations, or new consults, please call answering service at [phone] (weekdays); [phone] (nights/weekends) 57 year old female with pmh of HTN, DM, pHTN, CKD presenting with dizziness, found to be profoundly bradycardic to the 30s, also noted to have a potassium of 6.9 on inital laboratory work. Patient admitted to MICU for management of hemodynamic instability. Endocrine consulted due to concern for myxemda.     #AMS, Hypotension, Hypothermia, Bradycardia w/ Metabolic derrangements c/f Myxedema coma   #Hypothyroidism  vitals notable for severe bradycardia to 30s, hypothermia to 88F, hypotension requiring pressor support   TSH 40 elevated, FT4 1.2 wnl w/ TT3 mildly low at 79 however clinical picture consistent with myxedema; myxedema coma score >60, around 110  hyponatremic to 129, hyperkalemic to 6.8   s/p Hydrocortisone 100mg IV x 1 dose in ED   primary team unable to determine if patient had the serum cortisol drawn before or after the hydrocortisone dose was given  therefore continued on hydrocortisone 100mg every 8 hours, tapered to 50 mg Q8H today.   please decrease hydrocortisone to 25mg every 8 hours  -give hydrocortisone 25mg in the morning tomorrow only  -check AM cortisol on 11/29AM prior to discharge  Recommendations:   - stop Levothyroxine 100mcg IV daily (slightly higher than weight based LT4 dose)  -switch to oral levothyroxine 112mcg daily tomororw morning on an empty stomach  - close hemodynamic monitoring     #DM2, uncontrolled   A1c 9.2% in September 2022  per med rec on prandin 0.5mg   Recommendations:   - check a1c   - Goal -180  - FS Blood glucose monitoring q6h  - Start Lantus 16 units QHS   - Start Admelog Low Dose Correction Scale [id number] (change to tidac/hs once back on po diet)   - Hypoglycemia protocol   - Carb Consistent Diet when resumed on po diet   - Once patient is back on po diet & eating, start Admelog 6 units tidac - hold if not eating     #Hx of HTN  Recommendations:   - defer to ccu team   - at home on amlodipine & labetalol per med rec   - outpt BP goal < 130/80   - outpatient annual urinary microalb/cr ratio    #HLD  Recommendations:   - LDL goal < 70   - at home on atorvastatin 40mg per med rec  - outpatient fasting lipid profile     Case discussed with Dr. Jayy Rojas MD  Endocrine Fellow  Can be reached via teams. For follow up questions, discharge recommendations, or new consults, please call answering service at [phone] (weekdays); [phone] (nights/weekends)

## 2022-11-27 NOTE — PROGRESS NOTE ADULT - SUBJECTIVE AND OBJECTIVE BOX
Medicine Progress Note    Patient is a 57y old  Female who presents with a chief complaint of dizziness/bradycardia/hyperkalemia (26 Nov 2022 14:05)      SUBJECTIVE / OVERNIGHT EVENTS: no events , Telemetry- SR @ 70 Bmp   Patient wants to go home     MEDICATIONS  (STANDING):  chlorhexidine 2% Cloths 1 Application(s) Topical <User Schedule>  dextrose 5%. 1000 milliLiter(s) (100 mL/Hr) IV Continuous <Continuous>  dextrose 5%. 1000 milliLiter(s) (50 mL/Hr) IV Continuous <Continuous>  dextrose 50% Injectable 25 Gram(s) IV Push once  dextrose 50% Injectable 12.5 Gram(s) IV Push once  dextrose 50% Injectable 25 Gram(s) IV Push once  glucagon  Injectable 1 milliGRAM(s) IntraMuscular once  heparin   Injectable 5000 Unit(s) SubCutaneous every 8 hours  hydrocortisone sodium succinate Injectable 50 milliGRAM(s) IV Push every 8 hours  insulin glargine Injectable (LANTUS) 16 Unit(s) SubCutaneous at bedtime  insulin lispro (ADMELOG) corrective regimen sliding scale   SubCutaneous three times a day before meals  insulin lispro (ADMELOG) corrective regimen sliding scale   SubCutaneous at bedtime  insulin lispro Injectable (ADMELOG) 9 Unit(s) SubCutaneous three times a day before meals  levothyroxine Injectable 100 MICROGram(s) IV Push <User Schedule>  mupirocin 2% Ointment 1 Application(s) Topical two times a day  piperacillin/tazobactam IVPB.. 3.375 Gram(s) IV Intermittent every 8 hours    MEDICATIONS  (PRN):  dextrose Oral Gel 15 Gram(s) Oral once PRN Blood Glucose LESS THAN 70 milliGRAM(s)/deciliter    CAPILLARY BLOOD GLUCOSE      POCT Blood Glucose.: 329 mg/dL (27 Nov 2022 12:29)  POCT Blood Glucose.: 215 mg/dL (27 Nov 2022 08:37)  POCT Blood Glucose.: 291 mg/dL (26 Nov 2022 22:08)  POCT Blood Glucose.: 329 mg/dL (26 Nov 2022 17:49)  POCT Blood Glucose.: 304 mg/dL (26 Nov 2022 16:45)    I&O's Summary    26 Nov 2022 07:01  -  27 Nov 2022 07:00  --------------------------------------------------------  IN: 300 mL / OUT: 1335 mL / NET: -1035 mL        PHYSICAL EXAM:  Vital Signs Last 24 Hrs  T(C): 36.6 (27 Nov 2022 08:30), Max: 36.8 (26 Nov 2022 16:20)  T(F): 97.9 (27 Nov 2022 08:30), Max: 98.2 (26 Nov 2022 16:20)  HR: 71 (27 Nov 2022 08:30) (66 - 78)  BP: 167/81 (27 Nov 2022 08:30) (125/50 - 167/81)  BP(mean): 84 (26 Nov 2022 16:20) (67 - 84)  RR: 16 (27 Nov 2022 08:30) (16 - 21)  SpO2: 100% (27 Nov 2022 08:30) (97% - 100%)    Parameters below as of 27 Nov 2022 08:30  Patient On (Oxygen Delivery Method): room air      CONSTITUTIONAL: NAD,   ENMT: Moist oral mucosa, no pharyngeal injection or exudates; normal dentition  RESPIRATORY: Normal respiratory effort; lungs are clear to auscultation bilaterally  CARDIOVASCULAR: Regular rate and rhythm, normal S1 and S2, ; No lower extremity edema;   ABDOMEN: Nontender to palpation, normoactive bowel sounds, no rebound/guarding;  PSYCH: A+O to person, place, and time; affect appropriate  NEUROLOGY: CN 2-12 are intact and symmetric; no gross sensory deficits   SKIN: No rashes; no palpable lesions    LABS:                        7.8    9.03  )-----------( 147      ( 27 Nov 2022 07:00 )             24.1     11-27    137  |  99  |  84<H>  ----------------------------<  204<H>  3.9   |  24  |  2.01<H>    Ca    9.8      27 Nov 2022 07:00  Phos  5.7     11-26  Mg     2.10     11-26    TPro  7.0  /  Alb  3.8  /  TBili  0.4  /  DBili  x   /  AST  42<H>  /  ALT  93<H>  /  AlkPhos  342<H>  11-27      CARDIAC MARKERS ( 25 Nov 2022 21:00 )  x     / x     / 150 U/L / x     / x              Culture - Blood (collected 25 Nov 2022 14:55)  Source: .Blood Blood  Preliminary Report (26 Nov 2022 20:02):    No growth to date.    Culture - Blood (collected 25 Nov 2022 14:05)  Source: .Blood Blood-Peripheral  Preliminary Report (26 Nov 2022 20:02):    No growth to date.    Culture - Urine (collected 25 Nov 2022 12:42)  Source: Clean Catch Clean Catch (Midstream)  Final Report (26 Nov 2022 10:55):    No growth      COVID-19 PCR: NotDetec (04 Oct 2022 06:50)  SARS-CoV-2: NotDetec (28 Sep 2022 00:04)      RADIOLOGY & ADDITIONAL TESTS:  Imaging from Last 24 Hours:    Electrocardiogram/QTc Interval:    COORDINATION OF CARE:  Care Discussed with Consultants/Other Providers:

## 2022-11-27 NOTE — PROGRESS NOTE ADULT - ASSESSMENT
Shila Hernandez is a 57 year old female with pmh of HTN, DM, pHTN, CKD presenting with dizziness, found to be profoundly bradycardic to the 30s, also noted to have a potassium of 6.9 on inital laboratory work. Patient admitted to MICU for management of hemodynamic instability and nephrology evaluation.     #Neuro  - AMS/lethargy- likely cardiac in origin i/s/o profound bradycardia vs 2/2 midazolam used prior to transcutaneous pacing   Now resolved   Wants to go home     #Cardiovascular  - profound bradycardia- was transcutaneously paced by EMS  - Dopamine infusion was DC   - Now telemetry- SR at 70 BMP   - c/w home BP meds   - check lipid panel, c/w Atorvastatin home dose     #Respiratory  -no active issues    #GI/Nutrition  -evaluate nutrition   - obesity     #/Renal  -CKD with hyperkalemia to 6.9 on initial labs without hemolysis. Awaiting repeat CMP, now K is 3.9     #Skin  -no active issues     #ID  - no active issues    #Endocrine  - history of hypothyroidism, macroglossia on exam- f/w Endo recs  c/w hydrocortisone 50 mg IV q 8   C/w levothyroxine 100 mcg IV daily   F/w Endo recs   - DM- f/w endo recs . check HB A1C     #Hematologic/DVT ppx  - anemia/thrombocytopenia- possible CKD contribution- will trend cbc's , now plt 147   - may be close to her baseline   - type/screen  - early ambulation , SCD

## 2022-11-28 DIAGNOSIS — D63.8 ANEMIA IN OTHER CHRONIC DISEASES CLASSIFIED ELSEWHERE: ICD-10-CM

## 2022-11-28 DIAGNOSIS — N17.9 ACUTE KIDNEY FAILURE, UNSPECIFIED: ICD-10-CM

## 2022-11-28 DIAGNOSIS — R00.1 BRADYCARDIA, UNSPECIFIED: ICD-10-CM

## 2022-11-28 DIAGNOSIS — Z29.9 ENCOUNTER FOR PROPHYLACTIC MEASURES, UNSPECIFIED: ICD-10-CM

## 2022-11-28 LAB
ALBUMIN SERPL ELPH-MCNC: 3.6 G/DL — SIGNIFICANT CHANGE UP (ref 3.3–5)
ALP SERPL-CCNC: 294 U/L — HIGH (ref 40–120)
ALT FLD-CCNC: 70 U/L — HIGH (ref 4–33)
ANION GAP SERPL CALC-SCNC: 13 MMOL/L — SIGNIFICANT CHANGE UP (ref 7–14)
AST SERPL-CCNC: 30 U/L — SIGNIFICANT CHANGE UP (ref 4–32)
BASOPHILS # BLD AUTO: 0 K/UL — SIGNIFICANT CHANGE UP (ref 0–0.2)
BASOPHILS NFR BLD AUTO: 0 % — SIGNIFICANT CHANGE UP (ref 0–2)
BILIRUB SERPL-MCNC: 0.4 MG/DL — SIGNIFICANT CHANGE UP (ref 0.2–1.2)
BUN SERPL-MCNC: 73 MG/DL — HIGH (ref 7–23)
CALCIUM SERPL-MCNC: 9.2 MG/DL — SIGNIFICANT CHANGE UP (ref 8.4–10.5)
CHLORIDE SERPL-SCNC: 102 MMOL/L — SIGNIFICANT CHANGE UP (ref 98–107)
CO2 SERPL-SCNC: 24 MMOL/L — SIGNIFICANT CHANGE UP (ref 22–31)
CREAT SERPL-MCNC: 1.99 MG/DL — HIGH (ref 0.5–1.3)
EGFR: 29 ML/MIN/1.73M2 — LOW
EOSINOPHIL # BLD AUTO: 0.07 K/UL — SIGNIFICANT CHANGE UP (ref 0–0.5)
EOSINOPHIL NFR BLD AUTO: 0.7 % — SIGNIFICANT CHANGE UP (ref 0–6)
GLUCOSE BLDC GLUCOMTR-MCNC: 110 MG/DL — HIGH (ref 70–99)
GLUCOSE BLDC GLUCOMTR-MCNC: 124 MG/DL — HIGH (ref 70–99)
GLUCOSE BLDC GLUCOMTR-MCNC: 98 MG/DL — SIGNIFICANT CHANGE UP (ref 70–99)
GLUCOSE SERPL-MCNC: 133 MG/DL — HIGH (ref 70–99)
HCT VFR BLD CALC: 23.7 % — LOW (ref 34.5–45)
HGB BLD-MCNC: 7.4 G/DL — LOW (ref 11.5–15.5)
IANC: 8.52 K/UL — HIGH (ref 1.8–7.4)
IMM GRANULOCYTES NFR BLD AUTO: 1.3 % — HIGH (ref 0–0.9)
LYMPHOCYTES # BLD AUTO: 0.93 K/UL — LOW (ref 1–3.3)
LYMPHOCYTES # BLD AUTO: 9.1 % — LOW (ref 13–44)
MCHC RBC-ENTMCNC: 24.3 PG — LOW (ref 27–34)
MCHC RBC-ENTMCNC: 31.2 GM/DL — LOW (ref 32–36)
MCV RBC AUTO: 78 FL — LOW (ref 80–100)
MONOCYTES # BLD AUTO: 0.57 K/UL — SIGNIFICANT CHANGE UP (ref 0–0.9)
MONOCYTES NFR BLD AUTO: 5.6 % — SIGNIFICANT CHANGE UP (ref 2–14)
NEUTROPHILS # BLD AUTO: 8.52 K/UL — HIGH (ref 1.8–7.4)
NEUTROPHILS NFR BLD AUTO: 83.3 % — HIGH (ref 43–77)
NRBC # BLD: 0 /100 WBCS — SIGNIFICANT CHANGE UP (ref 0–0)
NRBC # FLD: 0 K/UL — SIGNIFICANT CHANGE UP (ref 0–0)
PLATELET # BLD AUTO: 146 K/UL — LOW (ref 150–400)
POTASSIUM SERPL-MCNC: 3.4 MMOL/L — LOW (ref 3.5–5.3)
POTASSIUM SERPL-SCNC: 3.4 MMOL/L — LOW (ref 3.5–5.3)
PROT SERPL-MCNC: 6.3 G/DL — SIGNIFICANT CHANGE UP (ref 6–8.3)
RBC # BLD: 3.04 M/UL — LOW (ref 3.8–5.2)
RBC # FLD: 16.5 % — HIGH (ref 10.3–14.5)
SODIUM SERPL-SCNC: 139 MMOL/L — SIGNIFICANT CHANGE UP (ref 135–145)
WBC # BLD: 10.22 K/UL — SIGNIFICANT CHANGE UP (ref 3.8–10.5)
WBC # FLD AUTO: 10.22 K/UL — SIGNIFICANT CHANGE UP (ref 3.8–10.5)

## 2022-11-28 PROCEDURE — 99233 SBSQ HOSP IP/OBS HIGH 50: CPT

## 2022-11-28 PROCEDURE — 99233 SBSQ HOSP IP/OBS HIGH 50: CPT | Mod: GC

## 2022-11-28 RX ORDER — ATORVASTATIN CALCIUM 80 MG/1
40 TABLET, FILM COATED ORAL AT BEDTIME
Refills: 0 | Status: DISCONTINUED | OUTPATIENT
Start: 2022-11-28 | End: 2022-11-29

## 2022-11-28 RX ORDER — INSULIN GLARGINE 100 [IU]/ML
12 INJECTION, SOLUTION SUBCUTANEOUS AT BEDTIME
Refills: 0 | Status: DISCONTINUED | OUTPATIENT
Start: 2022-11-28 | End: 2022-11-29

## 2022-11-28 RX ORDER — POTASSIUM CHLORIDE 20 MEQ
10 PACKET (EA) ORAL ONCE
Refills: 0 | Status: COMPLETED | OUTPATIENT
Start: 2022-11-28 | End: 2022-11-28

## 2022-11-28 RX ORDER — LABETALOL HCL 100 MG
50 TABLET ORAL
Refills: 0 | Status: DISCONTINUED | OUTPATIENT
Start: 2022-11-28 | End: 2022-11-29

## 2022-11-28 RX ORDER — LEVOTHYROXINE SODIUM 125 MCG
112 TABLET ORAL ONCE
Refills: 0 | Status: COMPLETED | OUTPATIENT
Start: 2022-11-28 | End: 2022-11-28

## 2022-11-28 RX ORDER — HYDRALAZINE HCL 50 MG
50 TABLET ORAL EVERY 8 HOURS
Refills: 0 | Status: DISCONTINUED | OUTPATIENT
Start: 2022-11-28 | End: 2022-11-29

## 2022-11-28 RX ADMIN — ATORVASTATIN CALCIUM 40 MILLIGRAM(S): 80 TABLET, FILM COATED ORAL at 22:38

## 2022-11-28 RX ADMIN — PIPERACILLIN AND TAZOBACTAM 25 GRAM(S): 4; .5 INJECTION, POWDER, LYOPHILIZED, FOR SOLUTION INTRAVENOUS at 22:36

## 2022-11-28 RX ADMIN — Medication 50 MILLIGRAM(S): at 17:14

## 2022-11-28 RX ADMIN — CHLORHEXIDINE GLUCONATE 1 APPLICATION(S): 213 SOLUTION TOPICAL at 05:27

## 2022-11-28 RX ADMIN — Medication 9 UNIT(S): at 18:21

## 2022-11-28 RX ADMIN — HEPARIN SODIUM 5000 UNIT(S): 5000 INJECTION INTRAVENOUS; SUBCUTANEOUS at 22:37

## 2022-11-28 RX ADMIN — MUPIROCIN 1 APPLICATION(S): 20 OINTMENT TOPICAL at 05:21

## 2022-11-28 RX ADMIN — PIPERACILLIN AND TAZOBACTAM 25 GRAM(S): 4; .5 INJECTION, POWDER, LYOPHILIZED, FOR SOLUTION INTRAVENOUS at 13:27

## 2022-11-28 RX ADMIN — Medication 50 MILLIGRAM(S): at 22:38

## 2022-11-28 RX ADMIN — Medication 25 MILLIGRAM(S): at 05:21

## 2022-11-28 RX ADMIN — HEPARIN SODIUM 5000 UNIT(S): 5000 INJECTION INTRAVENOUS; SUBCUTANEOUS at 13:21

## 2022-11-28 RX ADMIN — Medication 112 MICROGRAM(S): at 18:22

## 2022-11-28 RX ADMIN — Medication 9 UNIT(S): at 12:37

## 2022-11-28 RX ADMIN — PIPERACILLIN AND TAZOBACTAM 25 GRAM(S): 4; .5 INJECTION, POWDER, LYOPHILIZED, FOR SOLUTION INTRAVENOUS at 05:20

## 2022-11-28 RX ADMIN — Medication 10 MILLIEQUIVALENT(S): at 10:28

## 2022-11-28 RX ADMIN — Medication 50 MILLIGRAM(S): at 13:44

## 2022-11-28 RX ADMIN — MUPIROCIN 1 APPLICATION(S): 20 OINTMENT TOPICAL at 17:16

## 2022-11-28 RX ADMIN — HEPARIN SODIUM 5000 UNIT(S): 5000 INJECTION INTRAVENOUS; SUBCUTANEOUS at 05:20

## 2022-11-28 RX ADMIN — Medication 9 UNIT(S): at 10:32

## 2022-11-28 NOTE — PROGRESS NOTE ADULT - PROBLEM SELECTOR PLAN 3
Noted with elevated Cr to 2s this admission  - per chart review previous baseline appears to be 1.4-1.7 range  - noted to be 1.99 today, approaching baseline  - patient noted regular urination  - tolerating PO intake  - will cont to trend, closely monitor electrolytes

## 2022-11-28 NOTE — PROVIDER CONTACT NOTE (OTHER) - SITUATION
Pt /66. asymptomatic.
patient refusing night time lantus, pt stating sugar is okay and no need for any insulin
Pt had /76 at 22:00 vitals, given IVP hydralazine, an hour later 165/70

## 2022-11-28 NOTE — PROVIDER CONTACT NOTE (OTHER) - ACTION/TREATMENT ORDERED:
Per provider, no intervention for now, will discuss what to do with patient's BP regimen once day team comes on. RN will continue to monitor pt.
Team recommends monitoring pt, if BP still elevated in morning let them know.  No further intervention at this time.
provider okay to refuse insulin, provider will come and speak to patient during night.

## 2022-11-28 NOTE — PROGRESS NOTE ADULT - PROBLEM SELECTOR PLAN 5
- A1C noted to 9.7%  - endocrinology following, recs appreciated  - Lantus 16u QHS  - Admelog 9U TIDAC and ADRIAN  - CC diet

## 2022-11-28 NOTE — PROGRESS NOTE ADULT - ASSESSMENT
57 year old female with pmh of HTN, DM, pHTN, CKD presenting with dizziness, found to be profoundly bradycardic to the 30s, also noted to have a potassium of 6.9 on inital laboratory work. Patient admitted to MICU for management of hemodynamic instability. Endocrine consulted due to concern for myxemda.     #AMS, Hypotension, Hypothermia, Bradycardia w/ Metabolic derrangements c/f Myxedema coma   #Hypothyroidism  vitals notable for severe bradycardia to 30s, hypothermia to 88F, hypotension requiring pressor support   TSH 40 elevated, FT4 1.2 wnl w/ TT3 mildly low at 79 however clinical picture consistent with myxedema; myxedema coma score >60, around 110  hyponatremic to 129, hyperkalemic to 6.8   s/p Hydrocortisone 100mg IV x 1 dose in ED   primary team unable to determine if patient had the serum cortisol drawn before or after the hydrocortisone dose was given  therefore was continued on hydrocortisone  Recommendations:   - check AM SERUM NOT FREE CORTISOL on 11/29am prior to discharge  - stop Levothyroxine 100mcg IV daily (slightly higher than weight based LT4 dose)  - switch to oral levothyroxine 112mcg daily (maintain on empty stomach (at least 1 hour before meals), separate by 4 hours from PPI or calcium supplementation which can inhibit its absorption)  - close hemodynamic monitoring     #DM2, uncontrolled   A1c 9.2% in September 2022  per med rec on prandin 0.5mg   Recommendations:   - check a1c   - Goal -180  - FS Blood glucose monitoring q6h  - Start Lantus 16 units QHS   - Start Admelog Low Dose Correction Scale [id number] (change to tidac/hs once back on po diet)   - Hypoglycemia protocol   - Carb Consistent Diet when resumed on po diet   - Once patient is back on po diet & eating, start Admelog 6 units tidac - hold if not eating   DISCHARGE RECOMMENDATION:    #Hx of HTN  Recommendations:   - defer to ccu team   - at home on amlodipine & labetalol per med rec   - outpt BP goal < 130/80   - outpatient annual urinary microalb/cr ratio    #HLD  Recommendations:   - LDL goal < 70   - at home on atorvastatin 40mg per med rec  - outpatient fasting lipid profile     Discussed with primary team.     Thank you for the interesting consult.    Chavo Crawford MD, Endocrinology Fellow  Pager [phone] from 9am to 5pm. After hours and on weekends, please call [phone]. 57 year old female with pmh of HTN, DM, pHTN, CKD presenting with dizziness, found to be profoundly bradycardic to the 30s, also noted to have a potassium of 6.9 on inital laboratory work. Patient admitted to MICU for management of hemodynamic instability. Endocrine consulted due to concern for myxemda.     #AMS, Hypotension, Hypothermia, Bradycardia w/ Metabolic derrangements c/f Myxedema coma   #Hypothyroidism  vitals notable for severe bradycardia to 30s, hypothermia to 88F, hypotension requiring pressor support   TSH 40 elevated, FT4 1.2 wnl w/ TT3 mildly low at 79 however clinical picture consistent with myxedema; myxedema coma score >60, around 110  hyponatremic to 129, hyperkalemic to 6.8   s/p Hydrocortisone 100mg IV x 1 dose in ED   primary team unable to determine if patient had the serum cortisol drawn before or after the hydrocortisone dose was given  therefore was continued on hydrocortisone  Recommendations:   - check AM SERUM NOT FREE CORTISOL on 11/29am prior to discharge  - stop Levothyroxine 100mcg IV daily (slightly higher than weight based LT4 dose)  - switch to oral levothyroxine 112mcg daily (maintain on empty stomach (at least 1 hour before meals), separate by 4 hours from PPI or calcium supplementation which can inhibit its absorption)  - close hemodynamic monitoring     #DM2, uncontrolled   A1c 9.2% in September 2022  per med rec on prandin 0.5mg , levemir 8-10 units at night time  Recommendations:   - check a1c   - Goal -180  - FS Blood glucose monitoring q6h  - Start Lantus 16 units QHS   - Start Admelog Low Dose Correction Scale [id number] (change to tidac/hs once back on po diet)   - Hypoglycemia protocol   - Carb Consistent Diet when resumed on po diet   - Once patient is back on po diet & eating, start Admelog 6 units tidac - hold if not eating   DISCHARGE RECOMMENDATION:    #Hx of HTN  Recommendations:   - defer to ccu team   - at home on amlodipine & labetalol per med rec   - outpt BP goal < 130/80   - outpatient annual urinary microalb/cr ratio    #HLD  Recommendations:   - LDL goal < 70   - at home on atorvastatin 40mg per med rec  - outpatient fasting lipid profile     Discussed with primary team.     Thank you for the interesting consult.    Chavo Crawford MD, Endocrinology Fellow  Pager [phone] from 9am to 5pm. After hours and on weekends, please call [phone]. 57 year old female with pmh of HTN, DM, pHTN, CKD presenting with dizziness, found to be profoundly bradycardic to the 30s, also noted to have a potassium of 6.9 on inital laboratory work. Patient admitted to MICU for management of hemodynamic instability. Endocrine consulted due to concern for myxemda.     #AMS, Hypotension, Hypothermia, Bradycardia w/ Metabolic derrangements c/f Myxedema coma   #Hypothyroidism  vitals notable for severe bradycardia to 30s, hypothermia to 88F, hypotension requiring pressor support   TSH 40 elevated, FT4 1.2 wnl w/ TT3 mildly low at 79 however clinical picture consistent with myxedema; myxedema coma score >60, around 110  hyponatremic to 129, hyperkalemic to 6.8   s/p Hydrocortisone 100mg IV x 1 dose in ED   primary team unable to determine if patient had the serum cortisol drawn before or after the hydrocortisone dose was given  therefore was continued on hydrocortisone  Recommendations:   - check AM SERUM NOT FREE CORTISOL on 11/29am prior to discharge  - stop Levothyroxine 100mcg IV daily (slightly higher than weight based LT4 dose)  - stat dose of levothyroxine 112mcg NOW as patient did not get PO dose earlier today and then resume levothyroxine 112mcg daily starting 11/29 at 6am (maintain on empty stomach (at least 1 hour before meals), separate by 4 hours from PPI or calcium supplementation which can inhibit its absorption)  - close hemodynamic monitoring   - called [facility] pharmacy [phone] who reports patient is not taking levothyroxine at home    #DM2, uncontrolled   A1c 9.2% in September 2022  per med rec on prandin 0.5mg , levemir 8-10 units at night time, spoke with patients pharmacy who states the patient is taking levemir 20 units at night, novolog 70/30 flexpen 10 units every 12 hr and no prandin  Recommendations:   - check a1c   - Goal -180  - FS Blood glucose monitoring q6h  - Continue Lantus 16 units QHS   - Continue admelog 9 units premeal  - Continue Admelog Low Dose Correction Scale tidac/hs   - Hypoglycemia protocol prn  - Carb Consistent Diet   DISCHARGE RECOMMENDATION: please ask patient/family if patient has another pharmacy and if they can bring the patients home medications to the hospital or send pictures to clarify what the patient is actually taking to help guide dc recs    #Hx of HTN  Recommendations:   - at home on amlodipine & labetalol per med rec   - outpt BP goal < 130/80   - outpatient annual urinary microalb/cr ratio    #HLD  Recommendations:   - LDL goal < 70   - at home on atorvastatin 40mg per med rec  - outpatient fasting lipid profile     Discussed with primary team.     Thank you for the interesting consult.    Chavo Crawford MD, Endocrinology Fellow  Pager [phone] from 9am to 5pm. After hours and on weekends, please call [phone].

## 2022-11-28 NOTE — PROGRESS NOTE ADULT - ASSESSMENT
Shila Hernandez is a 57 year old female with pmh of HTN, DM, pHTN, CKD presenting with dizziness, found to be profoundly bradycardic to the 30s, also noted to have a potassium of 6.9 on inital laboratory work. Patient admitted to MICU for management of hemodynamic instability and nephrology evaluation.     #Neuro  - AMS/lethargy- likely cardiac in origin i/s/o profound bradycardia vs 2/2 midazolam used prior to transcutaneous pacing   Now resolved   Wants to go home     #Cardiovascular  - profound bradycardia- was transcutaneously paced by EMS  - Dopamine infusion was DC   - Now telemetry- SR at 70 BMP   - c/w home BP meds   - check lipid panel, c/w Atorvastatin home dose     #Respiratory  -no active issues    #GI/Nutrition  -evaluate nutrition   - obesity     #/Renal  -CKD with hyperkalemia to 6.9 on initial labs without hemolysis. Awaiting repeat CMP, now K is 3.9     #Skin  -no active issues     #ID  - no active issues    #Endocrine  - history of hypothyroidism, macroglossia on exam- f/w Endo recs  c/w hydrocortisone 50 mg IV q 8   C/w levothyroxine 100 mcg IV daily   F/w Endo recs   - DM- f/w endo recs . check HB A1C     #Hematologic/DVT ppx  - anemia/thrombocytopenia- possible CKD contribution- will trend cbc's , now plt 147   - may be close to her baseline   - type/screen  - early ambulation , SCD Shila Hernandez is a 57 year old female with pmh of HTN, DM, pHTN, CKD presenting with dizziness, found to be profoundly bradycardic to the 30s, also noted to have a potassium of 6.9 on inital laboratory work. Patient admitted to MICU for management of hemodynamic instability and nephrology evaluation.

## 2022-11-28 NOTE — PROGRESS NOTE ADULT - SUBJECTIVE AND OBJECTIVE BOX
[facility] Department of Hospital Medicine  Paulette Sesay MD  Available on MS Teams  Pager: [phone]    Patient is a 57y old  Female who presents with a chief complaint of dizziness/bradycardia/hyperkalemia (28 Nov 2022 10:53)    OVERNIGHT EVENTS: No acute events overnight.    SUBJECTIVE: Pt seen and examined at bedside this morning. Denies any acute complaints. Overall reports feeling well. Denies any CP, palpitations, dizziness, SOB, nausea, vomiting or abdominal pain. Has been eating very little as she does not like the menu options available. Overall eager to go home.     ADDITIONAL REVIEW OF SYSTEMS: As above.    MEDICATIONS  (STANDING):  chlorhexidine 2% Cloths 1 Application(s) Topical <User Schedule>  dextrose 5%. 1000 milliLiter(s) (50 mL/Hr) IV Continuous <Continuous>  dextrose 5%. 1000 milliLiter(s) (100 mL/Hr) IV Continuous <Continuous>  dextrose 50% Injectable 25 Gram(s) IV Push once  dextrose 50% Injectable 12.5 Gram(s) IV Push once  dextrose 50% Injectable 25 Gram(s) IV Push once  glucagon  Injectable 1 milliGRAM(s) IntraMuscular once  heparin   Injectable 5000 Unit(s) SubCutaneous every 8 hours  hydrALAZINE 50 milliGRAM(s) Oral every 8 hours  insulin glargine Injectable (LANTUS) 16 Unit(s) SubCutaneous at bedtime  insulin lispro (ADMELOG) corrective regimen sliding scale   SubCutaneous three times a day before meals  insulin lispro (ADMELOG) corrective regimen sliding scale   SubCutaneous at bedtime  insulin lispro Injectable (ADMELOG) 9 Unit(s) SubCutaneous three times a day before meals  labetalol 50 milliGRAM(s) Oral two times a day  levothyroxine 112 MICROGram(s) Oral daily  mupirocin 2% Ointment 1 Application(s) Topical two times a day  piperacillin/tazobactam IVPB.. 3.375 Gram(s) IV Intermittent every 8 hours    MEDICATIONS  (PRN):  dextrose Oral Gel 15 Gram(s) Oral once PRN Blood Glucose LESS THAN 70 milliGRAM(s)/deciliter    CAPILLARY BLOOD GLUCOSE    POCT Blood Glucose.: 124 mg/dL (28 Nov 2022 12:22)  POCT Blood Glucose.: 120 mg/dL (28 Nov 2022 09:04)  POCT Blood Glucose.: 295 mg/dL (27 Nov 2022 22:39)  POCT Blood Glucose.: 270 mg/dL (27 Nov 2022 18:07)    I&O's Summary    PHYSICAL EXAM:  Vital Signs Last 24 Hrs  T(C): 37 (28 Nov 2022 13:00), Max: 37 (28 Nov 2022 13:00)  T(F): 98.6 (28 Nov 2022 13:00), Max: 98.6 (28 Nov 2022 13:00)  HR: 72 (28 Nov 2022 13:00) (72 - 77)  BP: 185/72 (28 Nov 2022 13:00) (154/64 - 185/76)  BP(mean): 101 (28 Nov 2022 13:00) (101 - 101)  RR: 18 (28 Nov 2022 13:00) (17 - 18)  SpO2: 100% (28 Nov 2022 13:00) (98% - 100%)    Parameters below as of 28 Nov 2022 13:00  Patient On (Oxygen Delivery Method): room air    CONSTITUTIONAL: NAD, well-developed  HEAD: Normocephalic, atraumatic  EYES: EOMI, PERRL  ENT: no rhinorrhea, no pharyngeal erythema  RESPIRATORY: No increased work of breathing, CTAB, no wheezes or crackles appreciated  CARDIOVASCULAR: RRR, S1 and S2 present, no m/r/g  ABDOMEN: soft, NT, ND, bowel sounds present  EXTREMITIES: No LE edema  MUSCULOSKELETAL: no joint swelling, no tenderness to palpation  NEURO: A&Ox3, moving all extremities    LABS:                        7.4    10.22 )-----------( 146      ( 28 Nov 2022 06:38 )             23.7     11-28    139  |  102  |  73<H>  ----------------------------<  133<H>  3.4<L>   |  24  |  1.99<H>    Ca    9.2      28 Nov 2022 06:38    TPro  6.3  /  Alb  3.6  /  TBili  0.4  /  DBili  x   /  AST  30  /  ALT  70<H>  /  AlkPhos  294<H>  11-28    RADIOLOGY & ADDITIONAL TESTS:    Results Reviewed:   Imaging Personally Reviewed:  Electrocardiogram Personally Reviewed:    COORDINATION OF CARE:  Care Discussed with Consultants/Other Providers [Y/N]:  Prior or Outpatient Records Reviewed [Y/N]:

## 2022-11-28 NOTE — PROGRESS NOTE ADULT - PROBLEM SELECTOR PLAN 2
Noted with Hgb in 7-8 range  - appears to be at baseline Noted with profound bradycardia on admission requiring trancutaneous pacing  - believed to be in setting of hypothyroidism  - management as above  - HRs now back to normal  - cont to monitor on telemetry

## 2022-11-28 NOTE — PROGRESS NOTE ADULT - PROBLEM SELECTOR PLAN 1
initially presented with bradycardia and hypothermia, likely in setting of profound hypothyroidism/myxedema  - history of hypothyroidism, and pt reported missing doses  - endocrinology following, recs appreciated  - now s/p hydrocortisone therapy, per endo last dose to be given today  - c/w levothyroxine 112 mcg daily

## 2022-11-28 NOTE — PROGRESS NOTE ADULT - SUBJECTIVE AND OBJECTIVE BOX
ENDOCRINE FOLLOW UP     Chief Complaint: myxedema coma    History:     MEDICATIONS  (STANDING):  chlorhexidine 2% Cloths 1 Application(s) Topical <User Schedule>  dextrose 5%. 1000 milliLiter(s) (50 mL/Hr) IV Continuous <Continuous>  dextrose 5%. 1000 milliLiter(s) (100 mL/Hr) IV Continuous <Continuous>  dextrose 50% Injectable 25 Gram(s) IV Push once  dextrose 50% Injectable 12.5 Gram(s) IV Push once  dextrose 50% Injectable 25 Gram(s) IV Push once  glucagon  Injectable 1 milliGRAM(s) IntraMuscular once  heparin   Injectable 5000 Unit(s) SubCutaneous every 8 hours  insulin glargine Injectable (LANTUS) 16 Unit(s) SubCutaneous at bedtime  insulin lispro (ADMELOG) corrective regimen sliding scale   SubCutaneous three times a day before meals  insulin lispro (ADMELOG) corrective regimen sliding scale   SubCutaneous at bedtime  insulin lispro Injectable (ADMELOG) 9 Unit(s) SubCutaneous three times a day before meals  levothyroxine 112 MICROGram(s) Oral daily  mupirocin 2% Ointment 1 Application(s) Topical two times a day  piperacillin/tazobactam IVPB.. 3.375 Gram(s) IV Intermittent every 8 hours    MEDICATIONS  (PRN):  dextrose Oral Gel 15 Gram(s) Oral once PRN Blood Glucose LESS THAN 70 milliGRAM(s)/deciliter      Allergies    No Known Allergies    Intolerances        ROS: All other systems reviewed and negative    PHYSICAL EXAM:  VITALS: T(C): 36.9 (11-28-22 @ 10:00)  T(F): 98.4 (11-28-22 @ 10:00), Max: 98.4 (11-28-22 @ 10:00)  HR: 74 (11-28-22 @ 10:00) (72 - 77)  BP: 172/81 (11-28-22 @ 10:00) (154/64 - 185/76)  RR:  (17 - 18)  SpO2:  (98% - 100%)  Wt(kg): --  GENERAL: NAD, resting comfortably   EYES: No proptosis,  anicteric  HEENT:  Atraumatic, Normocephalic, moist mucous membranes  RESPIRATORY: Nonlabored respirations on room air, normal rate/effort   CARDIOVASCULAR: Regular rate and rhythm; No murmurs  GI: Soft, nontender, non distended, normal bowel sounds  NEURO: AOx3, moves all extremities spontaneously  PSYCH:  reactive affect, euthymic mood    POCT Blood Glucose.: 120 mg/dL (11-28-22 @ 09:04)  POCT Blood Glucose.: 295 mg/dL (11-27-22 @ 22:39)  POCT Blood Glucose.: 270 mg/dL (11-27-22 @ 18:07)  POCT Blood Glucose.: 329 mg/dL (11-27-22 @ 12:29)  POCT Blood Glucose.: 215 mg/dL (11-27-22 @ 08:37)  POCT Blood Glucose.: 291 mg/dL (11-26-22 @ 22:08)  POCT Blood Glucose.: 329 mg/dL (11-26-22 @ 17:49)  POCT Blood Glucose.: 304 mg/dL (11-26-22 @ 16:45)  POCT Blood Glucose.: 287 mg/dL (11-26-22 @ 11:26)  POCT Blood Glucose.: 195 mg/dL (11-26-22 @ 07:24)  POCT Blood Glucose.: 188 mg/dL (11-25-22 @ 23:17)  POCT Blood Glucose.: 112 mg/dL (11-25-22 @ 22:37)  POCT Blood Glucose.: 209 mg/dL (11-25-22 @ 17:02)  POCT Blood Glucose.: 148 mg/dL (11-25-22 @ 13:42)  POCT Blood Glucose.: 463 mg/dL (11-25-22 @ 11:07)      11-28    139  |  102  |  73<H>  ----------------------------<  133<H>  3.4<L>   |  24  |  1.99<H>    eGFR: 29<L>    Ca    9.2      11-28  Mg     2.10     11-26  Phos  5.7     11-26    TPro  6.3  /  Alb  3.6  /  TBili  0.4  /  DBili  x   /  AST  30  /  ALT  70<H>  /  AlkPhos  294<H>  11-28      A1C with Estimated Average Glucose Result: 9.7 % (11-25-22 @ 14:20)  A1C with Estimated Average Glucose Result: 9.1 % (09-29-22 @ 06:00)  A1C with Estimated Average Glucose Result: 9.2 % (09-28-22 @ 07:47)      Thyroid Stimulating Hormone, Serum: 40.80 uIU/mL (11-25-22 @ 09:57)   ENDOCRINE FOLLOW UP     Chief Complaint: myxedema coma    History:   The patient reports feeling well. She denies chest pain, palpitations, tremor, diarrhea, constipation, nausea, or vomiting. She reports eating and drinking okay with good energy levels. She reports taking prandin 0.5mg tid at home with levemir 8-10 units at night. She has not been able to see an endocrinologist because she keeps ending up in the hospital.    MEDICATIONS  (STANDING):  chlorhexidine 2% Cloths 1 Application(s) Topical <User Schedule>  dextrose 5%. 1000 milliLiter(s) (50 mL/Hr) IV Continuous <Continuous>  dextrose 5%. 1000 milliLiter(s) (100 mL/Hr) IV Continuous <Continuous>  dextrose 50% Injectable 25 Gram(s) IV Push once  dextrose 50% Injectable 12.5 Gram(s) IV Push once  dextrose 50% Injectable 25 Gram(s) IV Push once  glucagon  Injectable 1 milliGRAM(s) IntraMuscular once  heparin   Injectable 5000 Unit(s) SubCutaneous every 8 hours  insulin glargine Injectable (LANTUS) 16 Unit(s) SubCutaneous at bedtime  insulin lispro (ADMELOG) corrective regimen sliding scale   SubCutaneous three times a day before meals  insulin lispro (ADMELOG) corrective regimen sliding scale   SubCutaneous at bedtime  insulin lispro Injectable (ADMELOG) 9 Unit(s) SubCutaneous three times a day before meals  levothyroxine 112 MICROGram(s) Oral daily  mupirocin 2% Ointment 1 Application(s) Topical two times a day  piperacillin/tazobactam IVPB.. 3.375 Gram(s) IV Intermittent every 8 hours    MEDICATIONS  (PRN):  dextrose Oral Gel 15 Gram(s) Oral once PRN Blood Glucose LESS THAN 70 milliGRAM(s)/deciliter      Allergies    No Known Allergies    Intolerances        ROS: All other systems reviewed and negative    PHYSICAL EXAM:  VITALS: T(C): 36.9 (11-28-22 @ 10:00)  T(F): 98.4 (11-28-22 @ 10:00), Max: 98.4 (11-28-22 @ 10:00)  HR: 74 (11-28-22 @ 10:00) (72 - 77)  BP: 172/81 (11-28-22 @ 10:00) (154/64 - 185/76)  RR:  (17 - 18)  SpO2:  (98% - 100%)  Wt(kg): --    GENERAL: NAD, resting comfortably   EYES: No proptosis,  anicteric  HEENT:  Atraumatic, Normocephalic, moist mucous membranes  RESPIRATORY: Nonlabored respirations on room air, normal rate/effort   CARDIOVASCULAR: Regular rate and rhythm; No murmurs  GI: Soft, nontender, non distended, normal bowel sounds  NEURO: Answering questions appropriately, moves all extremities spontaneously  PSYCH:  reactive affect, euthymic mood    POCT Blood Glucose.: 120 mg/dL (11-28-22 @ 09:04)  POCT Blood Glucose.: 295 mg/dL (11-27-22 @ 22:39)  POCT Blood Glucose.: 270 mg/dL (11-27-22 @ 18:07)  POCT Blood Glucose.: 329 mg/dL (11-27-22 @ 12:29)  POCT Blood Glucose.: 215 mg/dL (11-27-22 @ 08:37)  POCT Blood Glucose.: 291 mg/dL (11-26-22 @ 22:08)  POCT Blood Glucose.: 329 mg/dL (11-26-22 @ 17:49)  POCT Blood Glucose.: 304 mg/dL (11-26-22 @ 16:45)  POCT Blood Glucose.: 287 mg/dL (11-26-22 @ 11:26)  POCT Blood Glucose.: 195 mg/dL (11-26-22 @ 07:24)  POCT Blood Glucose.: 188 mg/dL (11-25-22 @ 23:17)  POCT Blood Glucose.: 112 mg/dL (11-25-22 @ 22:37)  POCT Blood Glucose.: 209 mg/dL (11-25-22 @ 17:02)  POCT Blood Glucose.: 148 mg/dL (11-25-22 @ 13:42)  POCT Blood Glucose.: 463 mg/dL (11-25-22 @ 11:07)      11-28    139  |  102  |  73<H>  ----------------------------<  133<H>  3.4<L>   |  24  |  1.99<H>    eGFR: 29<L>    Ca    9.2      11-28  Mg     2.10     11-26  Phos  5.7     11-26    TPro  6.3  /  Alb  3.6  /  TBili  0.4  /  DBili  x   /  AST  30  /  ALT  70<H>  /  AlkPhos  294<H>  11-28      A1C with Estimated Average Glucose Result: 9.7 % (11-25-22 @ 14:20)  A1C with Estimated Average Glucose Result: 9.1 % (09-29-22 @ 06:00)  A1C with Estimated Average Glucose Result: 9.2 % (09-28-22 @ 07:47)      Thyroid Stimulating Hormone, Serum: 40.80 uIU/mL (11-25-22 @ 09:57)   ENDOCRINE FOLLOW UP     Chief Complaint: myxedema coma    History:   The patient reports feeling well. She denies chest pain, palpitations, tremor, diarrhea, constipation, nausea, or vomiting. She reports eating and drinking okay with good energy levels. She reports taking prandin 0.5mg tid at home with levemir 8-10 units at night. She has not been able to see an endocrinologist because she keeps ending up in the hospital. Spoke with patients pharmacy, torsemide 40mg daily, hydralazine 100mg tid, labetalol 100mg half tab every 12 hours, amlodipine 10mg daily, clonidine 0.2mg twice daily, levemir 20 units at night time, novolog 70/30 flexpen 10 units every 12 hours, prandin and levothyroxine not seen.    MEDICATIONS  (STANDING):  chlorhexidine 2% Cloths 1 Application(s) Topical <User Schedule>  dextrose 5%. 1000 milliLiter(s) (50 mL/Hr) IV Continuous <Continuous>  dextrose 5%. 1000 milliLiter(s) (100 mL/Hr) IV Continuous <Continuous>  dextrose 50% Injectable 25 Gram(s) IV Push once  dextrose 50% Injectable 12.5 Gram(s) IV Push once  dextrose 50% Injectable 25 Gram(s) IV Push once  glucagon  Injectable 1 milliGRAM(s) IntraMuscular once  heparin   Injectable 5000 Unit(s) SubCutaneous every 8 hours  insulin glargine Injectable (LANTUS) 16 Unit(s) SubCutaneous at bedtime  insulin lispro (ADMELOG) corrective regimen sliding scale   SubCutaneous three times a day before meals  insulin lispro (ADMELOG) corrective regimen sliding scale   SubCutaneous at bedtime  insulin lispro Injectable (ADMELOG) 9 Unit(s) SubCutaneous three times a day before meals  levothyroxine 112 MICROGram(s) Oral daily  mupirocin 2% Ointment 1 Application(s) Topical two times a day  piperacillin/tazobactam IVPB.. 3.375 Gram(s) IV Intermittent every 8 hours    MEDICATIONS  (PRN):  dextrose Oral Gel 15 Gram(s) Oral once PRN Blood Glucose LESS THAN 70 milliGRAM(s)/deciliter      Allergies    No Known Allergies    Intolerances        ROS: All other systems reviewed and negative    PHYSICAL EXAM:  VITALS: T(C): 36.9 (11-28-22 @ 10:00)  T(F): 98.4 (11-28-22 @ 10:00), Max: 98.4 (11-28-22 @ 10:00)  HR: 74 (11-28-22 @ 10:00) (72 - 77)  BP: 172/81 (11-28-22 @ 10:00) (154/64 - 185/76)  RR:  (17 - 18)  SpO2:  (98% - 100%)  Wt(kg): --    GENERAL: NAD, resting comfortably   EYES: No proptosis,  anicteric  HEENT:  Atraumatic, Normocephalic, moist mucous membranes  RESPIRATORY: Nonlabored respirations on room air, normal rate/effort   CARDIOVASCULAR: Regular rate and rhythm; No murmurs  GI: Soft, nontender, non distended, normal bowel sounds  NEURO: Answering questions appropriately, moves all extremities spontaneously  PSYCH:  reactive affect, euthymic mood    POCT Blood Glucose.: 120 mg/dL (11-28-22 @ 09:04)  POCT Blood Glucose.: 295 mg/dL (11-27-22 @ 22:39)  POCT Blood Glucose.: 270 mg/dL (11-27-22 @ 18:07)  POCT Blood Glucose.: 329 mg/dL (11-27-22 @ 12:29)  POCT Blood Glucose.: 215 mg/dL (11-27-22 @ 08:37)  POCT Blood Glucose.: 291 mg/dL (11-26-22 @ 22:08)  POCT Blood Glucose.: 329 mg/dL (11-26-22 @ 17:49)  POCT Blood Glucose.: 304 mg/dL (11-26-22 @ 16:45)  POCT Blood Glucose.: 287 mg/dL (11-26-22 @ 11:26)  POCT Blood Glucose.: 195 mg/dL (11-26-22 @ 07:24)  POCT Blood Glucose.: 188 mg/dL (11-25-22 @ 23:17)  POCT Blood Glucose.: 112 mg/dL (11-25-22 @ 22:37)  POCT Blood Glucose.: 209 mg/dL (11-25-22 @ 17:02)  POCT Blood Glucose.: 148 mg/dL (11-25-22 @ 13:42)  POCT Blood Glucose.: 463 mg/dL (11-25-22 @ 11:07)      11-28    139  |  102  |  73<H>  ----------------------------<  133<H>  3.4<L>   |  24  |  1.99<H>    eGFR: 29<L>    Ca    9.2      11-28  Mg     2.10     11-26  Phos  5.7     11-26    TPro  6.3  /  Alb  3.6  /  TBili  0.4  /  DBili  x   /  AST  30  /  ALT  70<H>  /  AlkPhos  294<H>  11-28      A1C with Estimated Average Glucose Result: 9.7 % (11-25-22 @ 14:20)  A1C with Estimated Average Glucose Result: 9.1 % (09-29-22 @ 06:00)  A1C with Estimated Average Glucose Result: 9.2 % (09-28-22 @ 07:47)      Thyroid Stimulating Hormone, Serum: 40.80 uIU/mL (11-25-22 @ 09:57)

## 2022-11-28 NOTE — PROGRESS NOTE ADULT - PROBLEM SELECTOR PLAN 6
Patient with history of HTN, on multiple antihypertensives at home  - restart hydralazine 50mg TID today (gradually uptitrate to home dose 100mg TID)  - restart home labetalol 50mg BID

## 2022-11-28 NOTE — PROGRESS NOTE ADULT - PROBLEM SELECTOR PLAN 4
Noted with Hgb in 7-8 range  - appears to be at baseline  - not active bleeding noted  - likely in setting CKD  - cont to monitor for now

## 2022-11-29 ENCOUNTER — TRANSCRIPTION ENCOUNTER (OUTPATIENT)
Age: 57
End: 2022-11-29

## 2022-11-29 VITALS
RESPIRATION RATE: 18 BRPM | HEART RATE: 63 BPM | SYSTOLIC BLOOD PRESSURE: 129 MMHG | TEMPERATURE: 98 F | OXYGEN SATURATION: 98 % | DIASTOLIC BLOOD PRESSURE: 87 MMHG

## 2022-11-29 LAB
ANION GAP SERPL CALC-SCNC: 12 MMOL/L — SIGNIFICANT CHANGE UP (ref 7–14)
BUN SERPL-MCNC: 65 MG/DL — HIGH (ref 7–23)
CALCIUM SERPL-MCNC: 9.1 MG/DL — SIGNIFICANT CHANGE UP (ref 8.4–10.5)
CHLORIDE SERPL-SCNC: 105 MMOL/L — SIGNIFICANT CHANGE UP (ref 98–107)
CO2 SERPL-SCNC: 26 MMOL/L — SIGNIFICANT CHANGE UP (ref 22–31)
CORTIS AM PEAK SERPL-MCNC: 15.9 UG/DL — SIGNIFICANT CHANGE UP (ref 6–18.4)
CREAT SERPL-MCNC: 1.93 MG/DL — HIGH (ref 0.5–1.3)
EGFR: 30 ML/MIN/1.73M2 — LOW
GLUCOSE BLDC GLUCOMTR-MCNC: 155 MG/DL — HIGH (ref 70–99)
GLUCOSE BLDC GLUCOMTR-MCNC: 211 MG/DL — HIGH (ref 70–99)
GLUCOSE SERPL-MCNC: 160 MG/DL — HIGH (ref 70–99)
HCT VFR BLD CALC: 24.5 % — LOW (ref 34.5–45)
HGB BLD-MCNC: 7.6 G/DL — LOW (ref 11.5–15.5)
MAGNESIUM SERPL-MCNC: 1.9 MG/DL — SIGNIFICANT CHANGE UP (ref 1.6–2.6)
MCHC RBC-ENTMCNC: 25.1 PG — LOW (ref 27–34)
MCHC RBC-ENTMCNC: 31 GM/DL — LOW (ref 32–36)
MCV RBC AUTO: 80.9 FL — SIGNIFICANT CHANGE UP (ref 80–100)
NRBC # BLD: 0 /100 WBCS — SIGNIFICANT CHANGE UP (ref 0–0)
NRBC # FLD: 0 K/UL — SIGNIFICANT CHANGE UP (ref 0–0)
PHOSPHATE SERPL-MCNC: 3.5 MG/DL — SIGNIFICANT CHANGE UP (ref 2.5–4.5)
PLATELET # BLD AUTO: 132 K/UL — LOW (ref 150–400)
POTASSIUM SERPL-MCNC: 3.4 MMOL/L — LOW (ref 3.5–5.3)
POTASSIUM SERPL-SCNC: 3.4 MMOL/L — LOW (ref 3.5–5.3)
RBC # BLD: 3.03 M/UL — LOW (ref 3.8–5.2)
RBC # FLD: 16.8 % — HIGH (ref 10.3–14.5)
SODIUM SERPL-SCNC: 143 MMOL/L — SIGNIFICANT CHANGE UP (ref 135–145)
WBC # BLD: 8.77 K/UL — SIGNIFICANT CHANGE UP (ref 3.8–10.5)
WBC # FLD AUTO: 8.77 K/UL — SIGNIFICANT CHANGE UP (ref 3.8–10.5)

## 2022-11-29 PROCEDURE — 99239 HOSP IP/OBS DSCHRG MGMT >30: CPT

## 2022-11-29 PROCEDURE — 99233 SBSQ HOSP IP/OBS HIGH 50: CPT | Mod: GC

## 2022-11-29 RX ORDER — ATORVASTATIN CALCIUM 80 MG/1
1 TABLET, FILM COATED ORAL
Qty: 0 | Refills: 0 | DISCHARGE

## 2022-11-29 RX ORDER — ATORVASTATIN CALCIUM 80 MG/1
1 TABLET, FILM COATED ORAL
Qty: 0 | Refills: 0 | DISCHARGE
Start: 2022-11-29

## 2022-11-29 RX ORDER — REPAGLINIDE 1 MG/1
1 TABLET ORAL
Qty: 0 | Refills: 0 | DISCHARGE

## 2022-11-29 RX ORDER — LEVOTHYROXINE SODIUM 125 MCG
1 TABLET ORAL
Qty: 0 | Refills: 0 | DISCHARGE

## 2022-11-29 RX ORDER — INSULIN DETEMIR 100/ML (3)
14 INSULIN PEN (ML) SUBCUTANEOUS
Qty: 1 | Refills: 0
Start: 2022-11-29 | End: 2022-12-28

## 2022-11-29 RX ORDER — PANTOPRAZOLE SODIUM 20 MG/1
1 TABLET, DELAYED RELEASE ORAL
Qty: 0 | Refills: 0 | DISCHARGE

## 2022-11-29 RX ORDER — REPAGLINIDE 1 MG/1
1 TABLET ORAL
Qty: 90 | Refills: 0
Start: 2022-11-29 | End: 2022-12-28

## 2022-11-29 RX ORDER — HYDRALAZINE HCL 50 MG
1 TABLET ORAL
Qty: 90 | Refills: 0
Start: 2022-11-29 | End: 2022-12-28

## 2022-11-29 RX ORDER — POTASSIUM CHLORIDE 20 MEQ
40 PACKET (EA) ORAL ONCE
Refills: 0 | Status: COMPLETED | OUTPATIENT
Start: 2022-11-29 | End: 2022-11-29

## 2022-11-29 RX ORDER — AMLODIPINE BESYLATE 2.5 MG/1
1 TABLET ORAL
Qty: 0 | Refills: 0 | DISCHARGE

## 2022-11-29 RX ORDER — MUPIROCIN 20 MG/G
1 OINTMENT TOPICAL
Qty: 0 | Refills: 0 | DISCHARGE
Start: 2022-11-29

## 2022-11-29 RX ORDER — LEVOTHYROXINE SODIUM 125 MCG
1 TABLET ORAL
Qty: 30 | Refills: 0
Start: 2022-11-29 | End: 2022-12-28

## 2022-11-29 RX ADMIN — Medication 112 MICROGRAM(S): at 05:57

## 2022-11-29 RX ADMIN — Medication 50 MILLIGRAM(S): at 05:56

## 2022-11-29 RX ADMIN — Medication 50 MILLIGRAM(S): at 05:57

## 2022-11-29 RX ADMIN — Medication 40 MILLIEQUIVALENT(S): at 13:07

## 2022-11-29 RX ADMIN — Medication 9 UNIT(S): at 12:28

## 2022-11-29 RX ADMIN — Medication 50 MILLIGRAM(S): at 13:05

## 2022-11-29 RX ADMIN — HEPARIN SODIUM 5000 UNIT(S): 5000 INJECTION INTRAVENOUS; SUBCUTANEOUS at 13:05

## 2022-11-29 RX ADMIN — Medication 2: at 12:29

## 2022-11-29 RX ADMIN — Medication 1: at 09:19

## 2022-11-29 RX ADMIN — HEPARIN SODIUM 5000 UNIT(S): 5000 INJECTION INTRAVENOUS; SUBCUTANEOUS at 05:57

## 2022-11-29 RX ADMIN — MUPIROCIN 1 APPLICATION(S): 20 OINTMENT TOPICAL at 05:58

## 2022-11-29 RX ADMIN — Medication 9 UNIT(S): at 09:19

## 2022-11-29 RX ADMIN — CHLORHEXIDINE GLUCONATE 1 APPLICATION(S): 213 SOLUTION TOPICAL at 05:49

## 2022-11-29 NOTE — DISCHARGE NOTE NURSING/CASE MANAGEMENT/SOCIAL WORK - NSDCPEFALRISK_GEN_ALL_CORE
For information on Fall & Injury Prevention, visit: [email or web address] OR  [email or web address] OR  https://www.cdc.gov/steadi/patient.html

## 2022-11-29 NOTE — DISCHARGE NOTE PROVIDER - CARE PROVIDER_API CALL
Jonel Coyle)  Nephrology  [address] [place] [address], [address] [place] [address]  Phone: [phone]  Fax: [phone]  Established Patient  Follow Up Time: 1 week    [facility]  [address] [place] [address]  Phone: [phone]  Fax: [phone]  Established Patient  Follow Up Time: 1 week

## 2022-11-29 NOTE — DISCHARGE NOTE PROVIDER - CARE PROVIDERS DIRECT ADDRESSES
[email or web address]

## 2022-11-29 NOTE — DISCHARGE NOTE NURSING/CASE MANAGEMENT/SOCIAL WORK - NSDCFUADDAPPT_GEN_ALL_CORE_FT
Please follow up with your primary care doctor within 1-2 weeks of discharge for further medication adjustments and for repeat blood work.     Please follow up with Dr. Coyle (kidney specialist), you already have an appointment scheduled on 12/02/22 at [address] [place] [address].

## 2022-11-29 NOTE — DISCHARGE NOTE PROVIDER - ATTENDING DISCHARGE PHYSICAL EXAMINATION:
Patient seen and examined at bedside on day of discharge (11/29/22). Currently reports feeling well. Denies any acute complaints. Tolerating diet, void and stooling appropriately. Denies any dizziness, CP, SOB, nausea/vomiting or abdominal pain. Patient with stable hemodynamics. BPs elevated on 11/28 and now at goal after reintroduction on home hydral (at half dose) and labetalol. Noted with anemia however stable and no evidence of active bleeding. Patient also noted with elevated Cr but overall downtrending. Suspected that patient's presentation may have been due to noncompliance with synthroid. Restarted on synthroid 112mcg qd here and cortisol levels here normal. Case discussed with endocrinology team, patient to c/w synthroid at 112mcg and follow up for repeat TFTs. Patient informed about modifications to BP medications. Will hold off on amlodipine and torsemide at this time and patient with well controlled BPs on hydralazine and labetalol only. Patient also appearing euvolemic. Patient counseled on importance of regular follow up with her PCP and endocrinology. Also provided information for nephrology follow up. Patient is otherwise medically optimized for discharge to home.    Vital Signs Last 24 Hrs  T(C): 36.7 (29 Nov 2022 12:30), Max: 36.9 (28 Nov 2022 16:45)  T(F): 98.1 (29 Nov 2022 12:30), Max: 98.5 (28 Nov 2022 19:30)  HR: 63 (29 Nov 2022 12:30) (63 - 88)  BP: 129/87 (29 Nov 2022 12:30) (129/87 - 180/77)  BP(mean): --  RR: 18 (29 Nov 2022 12:30) (16 - 18)  SpO2: 98% (29 Nov 2022 12:30) (96% - 100%)    Parameters below as of 29 Nov 2022 12:30  Patient On (Oxygen Delivery Method): room air    PHYSICAL EXAM:  GENERAL: NAD, lying in bed comfortably  HEENT: NC/AT, EOMI, PERRL  NECK: Supple, No JVD  CHEST/LUNG: CTAB, no increased WOB  HEART: RRR, no m/r/g  ABDOMEN: soft, NT, ND, BS+  EXTREMITIES:  2+ peripheral pulses, no clubbing, no edema  NERVOUS SYSTEM: A&Ox3, no focal deficits  MSK: FROM all 4 extremities, full and equal strength  SKIN: No rashes or lesions

## 2022-11-29 NOTE — PROGRESS NOTE ADULT - REASON FOR ADMISSION
dizziness/bradycardia/hyperkalemia

## 2022-11-29 NOTE — DISCHARGE NOTE PROVIDER - NSFOLLOWUPCLINICS_GEN_ALL_ED_FT
[facility]  Endocrinology  [address] [place] [address]  Phone: [phone]  Fax:

## 2022-11-29 NOTE — DISCHARGE NOTE PROVIDER - NSDCMRMEDTOKEN_GEN_ALL_CORE_FT
amLODIPine 10 mg oral tablet: 1 tab(s) orally once a day  atorvastatin 40 mg oral tablet: 1 tab(s) orally once a day  hydrALAZINE 100 mg oral tablet: 1 tab(s) orally 3 times a day  labetalol 100 mg oral tablet: 0.5 tab(s) orally 2 times a day  Levemir FlexPen 100 units/mL subcutaneous solution: 18 unit(s) subcutaneous once a day (at bedtime)   levothyroxine 50 mcg (0.05 mg) oral capsule: 1 cap(s) orally once a day  pantoprazole 40 mg oral delayed release tablet: 1 tab(s) orally once a day  repaglinide 0.5 mg oral tablet: 1 tab(s) orally 3 times a day (before meals)  torsemide 40 mg oral tablet: 1 tab(s) orally once a day   atorvastatin 40 mg oral tablet: 1 tab(s) orally once a day (at bedtime)  hydrALAZINE 50 mg oral tablet: 1 tab(s) orally every 8 hours  labetalol 100 mg oral tablet: 0.5 tab(s) orally 2 times a day  Levemir FlexPen 100 units/mL subcutaneous solution: 14 unit(s) subcutaneous once a day (at bedtime)   levothyroxine 112 mcg (0.112 mg) oral tablet: 1 tab(s) orally once a day  mupirocin 2% topical ointment: 1 application topically 2 times a day  repaglinide 2 mg oral tablet: 1 tab(s) orally 3 times a day (before meals)

## 2022-11-29 NOTE — DISCHARGE NOTE PROVIDER - NSDCFUSCHEDAPPT_GEN_ALL_CORE_FT
Jonel Coyle  [facility]  NEPHRO [address] [place] [address]   Scheduled Appointment: 12/02/2022

## 2022-11-29 NOTE — PROGRESS NOTE ADULT - ASSESSMENT
57 year old female with pmh of HTN, DM, pHTN, CKD presenting with dizziness, found to be profoundly bradycardic to the 30s, also noted to have a potassium of 6.9 on inital laboratory work. Patient admitted to MICU for management of hemodynamic instability. Endocrine consulted due to concern for myxemda.     #AMS, Hypotension, Hypothermia, Bradycardia w/ Metabolic derrangements c/f Myxedema coma   #Hypothyroidism  vitals notable for severe bradycardia to 30s, hypothermia to 88F, hypotension requiring pressor support   TSH 40 elevated, FT4 1.2 wnl w/ TT3 mildly low at 79 however clinical picture consistent with myxedema; myxedema coma score >60, around 110  hyponatremic to 129, hyperkalemic to 6.8   s/p Hydrocortisone 100mg IV x 1 dose in ED   primary team unable to determine if patient had the serum cortisol drawn before or after the hydrocortisone dose was given  therefore was continued on hydrocortisone  Recommendations:   - check AM SERUM NOT FREE CORTISOL on 11/29am prior to discharge  - stop Levothyroxine 100mcg IV daily (slightly higher than weight based LT4 dose)  - stat dose of levothyroxine 112mcg NOW as patient did not get PO dose earlier today and then resume levothyroxine 112mcg daily starting 11/29 at 6am (maintain on empty stomach (at least 1 hour before meals), separate by 4 hours from PPI or calcium supplementation which can inhibit its absorption)  - close hemodynamic monitoring   - called [facility] pharmacy [phone] who reports patient is not taking levothyroxine at home    #DM2, uncontrolled   A1c 9.2% in September 2022  per med rec on prandin 0.5mg , levemir 8-10 units at night time, spoke with patients pharmacy who states the patient is taking levemir 20 units at night, novolog 70/30 flexpen 10 units every 12 hr and no prandin  Recommendations:   - check a1c   - Goal -180  - FS Blood glucose monitoring q6h  - Continue Lantus 16 units QHS   - Continue admelog 9 units premeal  - Continue Admelog Low Dose Correction Scale tidac/hs   - Hypoglycemia protocol prn  - Carb Consistent Diet   DISCHARGE RECOMMENDATION: please ask patient/family if patient has another pharmacy and if they can bring the patients home medications to the hospital or send pictures to clarify what the patient is actually taking to help guide dc recs    #Hx of HTN  Recommendations:   - at home on amlodipine & labetalol per med rec   - outpt BP goal < 130/80   - outpatient annual urinary microalb/cr ratio    #HLD  Recommendations:   - LDL goal < 70   - at home on atorvastatin 40mg per med rec  - outpatient fasting lipid profile     Discussed with primary team.     Thank you for the interesting consult.    Chavo Crawford MD, Endocrinology Fellow  Pager [phone] from 9am to 5pm. After hours and on weekends, please call [phone]. 57 year old female with pmh of HTN, DM, pHTN, CKD presenting with dizziness, found to be profoundly bradycardic to the 30s, also noted to have a potassium of 6.9 on inital laboratory work. Patient admitted to MICU for management of hemodynamic instability. Endocrine consulted due to concern for myxemda.     #AMS, Hypotension, Hypothermia, Bradycardia w/ Metabolic derrangements c/f Myxedema coma   #Hypothyroidism  vitals notable for severe bradycardia to 30s, hypothermia to 88F, hypotension requiring pressor support   TSH 40 elevated, FT4 1.2 wnl w/ TT3 mildly low at 79 however clinical picture consistent with myxedema; myxedema coma score >60, around 110  hyponatremic to 129, hyperkalemic to 6.8   s/p Hydrocortisone 100mg IV x 1 dose in ED   primary team unable to determine if patient had the serum cortisol drawn before or after the hydrocortisone dose was given  therefore was continued on hydrocortisone  am cortisol 15.9, essentially rules out AI  Patient takes levothyroxine 50mcg daily  Recommendations:   - continue levothyroxine 112mcg daily starting 11/29 at 6am (maintain on empty stomach (at least 1 hour before meals), separate by 4 hours from PPI or calcium supplementation which can inhibit its absorption)  - can discharge on levothyroxine 112mcg daily, repeat TSH and FT4 in 6 weeks outpatient  - does not need steroids on discharge    #DM2, uncontrolled   A1c 9.2% in September 2022  Confirmed patient takes repaglinide 1mg tid and levemir 10 units (prescribed 20 units) as states 20 units is too much  Recommendations:   - check a1c   - Goal -180  - FS Blood glucose monitoring q6h  - Continue Lantus 16 units QHS   - Continue admelog 9 units premeal  - Continue Admelog Low Dose Correction Scale tidac/hs   - Hypoglycemia protocol prn  - Carb Consistent Diet   DISCHARGE RECOMMENDATION: levemir 14 units, repaglinide 2mg tid    #Hx of HTN  Recommendations:   - at home on amlodipine & labetalol per med rec   - outpt BP goal < 130/80   - outpatient annual urinary microalb/cr ratio    #HLD  Recommendations:   - LDL goal < 70   - at home on atorvastatin 40mg per med rec  - outpatient fasting lipid profile     Will schedule appointment for patient for follow up.  [address] [place] [address]  [address]  Phone Number: [phone]    Discussed with primary team.     Thank you for the interesting consult.    Chavo Crawford MD, Endocrinology Fellow  Pager [phone] from 9am to 5pm. After hours and on weekends, please call [phone]. 57 year old female with pmh of HTN, DM, pHTN, CKD presenting with dizziness, found to be profoundly bradycardic to the 30s, also noted to have a potassium of 6.9 on inital laboratory work. Patient admitted to MICU for management of hemodynamic instability. Endocrine consulted due to concern for myxemda.     #AMS, Hypotension, Hypothermia, Bradycardia w/ Metabolic derrangements c/f Myxedema coma   #Hypothyroidism  vitals notable for severe bradycardia to 30s, hypothermia to 88F, hypotension requiring pressor support   TSH 40 elevated, FT4 1.2 wnl w/ TT3 mildly low at 79 however clinical picture consistent with myxedema; myxedema coma score >60, around 110  hyponatremic to 129, hyperkalemic to 6.8   s/p Hydrocortisone 100mg IV x 1 dose in ED   primary team unable to determine if patient had the serum cortisol drawn before or after the hydrocortisone dose was given  therefore was continued on hydrocortisone  am cortisol 15.9, essentially rules out AI  Patient takes levothyroxine 50mcg daily  Recommendations:   - continue levothyroxine 112mcg daily starting 11/29 at 6am (maintain on empty stomach (at least 1 hour before meals), separate by 4 hours from PPI or calcium supplementation which can inhibit its absorption)  - can discharge on levothyroxine 112mcg PO daily, repeat TSH and FT4 in 6 weeks outpatient  - does not need steroids on discharge    #DM2, uncontrolled   A1c 9.2% in September 2022  Confirmed patient takes repaglinide 1mg tid and levemir 10 units (prescribed 20 units) as states 20 units is too much  Recommendations:   - check a1c   - Goal -180  - FS Blood glucose monitoring q6h  - Continue Lantus 16 units QHS   - Continue admelog 9 units premeal  - Continue Admelog Low Dose Correction Scale tidac/hs   - Hypoglycemia protocol prn  - Carb Consistent Diet   DISCHARGE RECOMMENDATION: Levemir 14 units qhs, repaglinide 2mg tid premeal.    #Hx of HTN  Recommendations:   - at home on amlodipine & labetalol per med rec   - outpt BP goal < 130/80   - outpatient annual urinary microalb/cr ratio    #HLD  Recommendations:   - LDL goal < 70   - at home on atorvastatin 40mg per med rec  - outpatient fasting lipid profile     Will schedule appointment for patient for follow up.  [address] [place] [address]  [address]  Phone Number: [phone]    Discussed with primary team.     Thank you for the interesting consult.    Chavo Crawford MD, Endocrinology Fellow  Pager [phone] from 9am to 5pm. After hours and on weekends, please call [phone].

## 2022-11-29 NOTE — PROGRESS NOTE ADULT - PROVIDER SPECIALTY LIST ADULT
Hospitalist
Critical Care
Electrophysiology
Endocrinology
Hospitalist
Endocrinology

## 2022-11-29 NOTE — DISCHARGE NOTE PROVIDER - PROVIDER TOKENS
PROVIDER:[TOKEN:[[id number]],FOLLOWUP:[1 week],ESTABLISHEDPATIENT:[T]],PROVIDER:[TOKEN:[[id number]],FOLLOWUP:[1 week],ESTABLISHEDPATIENT:[T]]

## 2022-11-29 NOTE — PROGRESS NOTE ADULT - ATTENDING COMMENTS
Patient seen at bedside in CCU - sinus rates in the 60s when I saw her. No indication for pacing at this time as bradycardia has corrected with treatment of hypothyroidism. EP will sign off. Please call with questions.    AMADA Castro
57 year old female with CKD, HFpEF, and pulmonary hypertension here with sinus bradycardia, possibly secondary to myxedema coma. Initially on dopamine for bradycardia, which is now off. Has an ISAMAR on CKD associated with hyperkalemia, which is improved. Treated with IV levothyroxine and hydrocortisone.    Follow up endocrinology recommendations  Continue levothyroxine and hydrocortisone for hypothyroidism / myxedema coma  Lantus, lispro TID with meals, and insulin sliding scale for poorly controlled DM2  Empiric zosyn given initial concern for aspiration pneumonia. Consider early discontinuation if cultures negative.    Eligible for transfer to floors
Agree with Dr. Roajs's assessment and plan as outlined above. Reviewed all pertinent labs, and imaging studies. Modifications made as indicated above. 57 F with history of HTN, DM, pHTN, CKD presenting with dizziness, found to be profoundly bradycardic to the 30s, also noted to have a potassium of 6.9 on initial laboratory work. Endocrine consulted due to concern for myxedema coma in the setting of bradycardia, hypothermia, hypotension s/p Dopamine. Random cortisol 26, team cannot confirm whether blood was sent prior to initiation of steroid. Since clinically improving, would taper Hydrocortisone and then reassess HPA axis when reached BID dosing. initial TSH 40.8 with FT4 1.2 (suggests poor adherence and patient confirms that on average she misses 2 doses per week). Continue with Levothyroxine 100 mcg IVP, can transition to oral Levothyroxine 125 mcg tomorrow. Rest of the plan as above.
Agree with Dr. Rojas's assessment and plan as outlined above. Reviewed all pertinent labs, and imaging studies. Modifications made as indicated above. 57 F with history of HTN, DM, pHTN, CKD presenting with dizziness, found to be profoundly bradycardic to the 30s, also noted to have a potassium of 6.9 on initial laboratory work. Endocrine consulted due to concern for myxedema coma in the setting of bradycardia, hypothermia, hypotension s/p Dopamine. Random cortisol 26, team cannot confirm whether blood was sent prior to initiation of steroid. Since clinically improving, would taper Hydrocortisone and then reassess HPA axis when reach BID dosing and then check am cortisol. initial TSH 40.8 with FT4 1.2 (suggests poor adherence and patient confirms that on average she misses 2 doses per week). Switch from IV to PO Levothyroxine 112 mcg starting tomorrow. Adjusted basal/bolus today. Rest of the plan as above.
57F with TSH 40.8, bradycardia, hypothermia, hypotension on admission initially consistent with myxedema coma. Now clinically improved, transition from IV to po levothyroxine. Check AM cortisol tomorrow 8AM to clarify no presence of AI.  Clarify home levothyroxine will contact outpatient pharmacy. [facility] in [place] - [phone]  DM2 uncontrolled optimize regimen for dc.  Complex patient high level decision making.    Citlalli Mejia MD  Division of Endocrinology  Pager: [phone]    If after 6PM or before 9AM, or on weekends/holidays, please call endocrine answering service for assistance ([phone]).  For nonurgent matters email [email or web address] for assistance.
57F with TSH 40.8, bradycardia, hypothermia, hypotension on admission initially consistent with myxedema coma. Now clinically improved, transition from IV to po levothyroxine. Checked AM cortisol within normal limits.  Clarified home levothyroxine is 50 mcg - dc on much higher dose of 112 mcg given myxedema on admission.  DM2 uncontrolled optimize regimen for dc - raise Levemir and prandin as outlined.  Complex patient high level decision making.    Citlalli Mejia MD  Division of Endocrinology  Pager: [phone]    If after 6PM or before 9AM, or on weekends/holidays, please call endocrine answering service for assistance ([phone]).  For nonurgent matters email [email or web address] for assistance.

## 2022-11-29 NOTE — DISCHARGE NOTE PROVIDER - HOSPITAL COURSE
Shila Hernandez is a 57 year old female with pmh of HTN, DM, pHTN, CKD presenting with dizziness, found to be profoundly bradycardic to the 30s, also noted to have a potassium of 6.9 on inital laboratory work. Patient admitted to MICU for management of hemodynamic instability and nephrology evaluation.     Problem/Plan - 1:  ·  Problem: Hypothyroidism.   ·  Plan: initially presented with bradycardia and hypothermia, likely in setting of profound hypothyroidism/myxedema  - history of hypothyroidism, and pt reported missing doses  - endocrinology following,  - now s/p hydrocortisone therapy, - c/w levothyroxine 112 mcg daily on discharge     Problem/Plan - 2:  ·  Problem: Bradycardia.  ·  Plan: Noted with profound bradycardia on admission requiring trancutaneous pacing  - believed to be in setting of hypothyroidism  - management as above  - HRs now back to normal  - cont to monitor on telemetry.      Problem/Plan - 3:  ·  Problem: Acute kidney injury superimposed on CKD.  ·  Plan: Noted with elevated Cr to 2s this admission  - per chart review previous baseline appears to be 1.4-1.7 range  - noted to be 1.99 today, approaching baseline  - patient noted regular urination  - tolerating PO intake  - will cont to trend, closely monitor electrolytes.    Problem/Plan - 4:  ·  Problem: Anemia of chronic disease.  ·  Plan: Noted with Hgb in 7-8 range  - appears to be at baseline  - not active bleeding noted  - likely in setting CKD  - cont to monitor for now.    Problem/Plan - 5:  ·  Problem: Uncontrolled type 2 diabetes mellitus with hyperglycemia.  ·  Plan: - A1C noted to 9.7%  - Lantus 16u QHS  - Admelog 9U TIDAC and ADRIAN  - CC diet.  As per endocrinology will discharge home on levemir 14 units , repaglinide 2mg TID. Will follow up with endocrine clinic as outpt     Problem/Plan - 6:  ·  Problem: HTN (hypertension).  ·  Plan: Patient with history of HTN, on multiple antihypertensives at home  - restart hydralazine 50mg TID today (gradually uptitrate to home dose 100mg TID)  - restart home labetalol 50mg BID.    Problem/Plan - 7:  ·  Problem: HLD (hyperlipidemia).   ·  Plan: c/w home atorva 40mg qhs.    Problem/Plan - 8:  ·  Problem: Need for prophylactic measure.   ·  Plan: DVT:   Diet: CC/Renal  Dispo: home.     Shila Hernandez is a 57 year old female with pmh of HTN, DM, pHTN, CKD presenting with dizziness, found to be profoundly bradycardic to the 30s, also noted to have a potassium of 6.9 on inital laboratory work. Patient admitted to MICU for management of hemodynamic instability and found to have likely myxedema due to medication noncompliance. Patient downgraded to medical floors and have overall improvement in hemodynamics and labs with reinstatement of levothyroxine. Patient overall improved and cleared for discharge by primary team as well as endocrinology. Patient to closely follow up with her PCP as well as endocrinology for further monitoring.    Problem/Plan - 1:  ·  Problem: Hypothyroidism.   ·  Plan: initially presented with bradycardia and hypothermia, likely in setting of profound hypothyroidism/myxedema  - history of hypothyroidism, and pt reported missing doses  - endocrinology following,  - now s/p hydrocortisone therapy, - c/w levothyroxine 112 mcg daily on discharge     Problem/Plan - 2:  ·  Problem: Bradycardia.  ·  Plan: Noted with profound bradycardia on admission requiring trancutaneous pacing  - believed to be in setting of hypothyroidism  - management as above  - HRs now back to normal  - cont to monitor on telemetry.      Problem/Plan - 3:  ·  Problem: Acute kidney injury superimposed on CKD.  ·  Plan: Noted with elevated Cr to 2s this admission  - per chart review previous baseline appears to be 1.4-1.7 range  - noted to be 1.99 today, approaching baseline  - patient noted regular urination  - tolerating PO intake  - will cont to trend, closely monitor electrolytes.    Problem/Plan - 4:  ·  Problem: Anemia of chronic disease.  ·  Plan: Noted with Hgb in 7-8 range  - appears to be at baseline  - not active bleeding noted  - likely in setting CKD  - cont to monitor for now.    Problem/Plan - 5:  ·  Problem: Uncontrolled type 2 diabetes mellitus with hyperglycemia.  ·  Plan: - A1C noted to 9.7%  - Lantus 16u QHS  - Admelog 9U TIDAC and ADRIAN  - CC diet.  As per endocrinology will discharge home on levemir 14 units , repaglinide 2mg TID. Will follow up with endocrine clinic as outpt     Problem/Plan - 6:  ·  Problem: HTN (hypertension).  ·  Plan: Patient with history of HTN, on multiple antihypertensives at home  - restart hydralazine 50mg TID today (gradually uptitrate to home dose 100mg TID)  - restart home labetalol 50mg BID.    Problem/Plan - 7:  ·  Problem: HLD (hyperlipidemia).   ·  Plan: c/w home atorva 40mg qhs.    Problem/Plan - 8:  ·  Problem: Need for prophylactic measure.   ·  Plan: DVT:   Diet: CC/Renal  Dispo: home.

## 2022-11-29 NOTE — DISCHARGE NOTE PROVIDER - NSDCCPCAREPLAN_GEN_ALL_CORE_FT
PRINCIPAL DISCHARGE DIAGNOSIS  Diagnosis: Myxedema coma  Assessment and Plan of Treatment: you were monitored in the ICU due to your slow heart rate and hypothermia which was caused by hypothyroidism   - you were given IV steroids during your hospitalization   - MUST TAKE  your levothyroxine 112 mcg daily   - follow up in the endocrine clinic . Will need to have your thyroid function tests rechecked in 4-6 weeks   do not skip any doses of your medications      SECONDARY DISCHARGE DIAGNOSES  Diagnosis: Acute renal failure  Assessment and Plan of Treatment: follow up with your primary medical doctor to have your bloodwork rechecked    Diagnosis: Hyperglycemia  Assessment and Plan of Treatment: medications adjusted  Monitor finger sticks pre-meal and bedtime, low salt, fat and carbohydrate diet, minimize glucose intake.  Exercise daily for at least 30 minutes and weight loss.  Follow up with primary care physician and endocrinologist for routine Hemoglobin A1C checks and management.  Follow up with your ophthalmologist for routine yearly vision exams.   - continue levemir , repaglinide 2mg three times daily    Diagnosis: Dizziness  Assessment and Plan of Treatment:     Diagnosis: Bradycardia  Assessment and Plan of Treatment: caused by hypothyroidism which has now improved .   follow up with your medical doctor within 1 week    Diagnosis: Hypertension  Assessment and Plan of Treatment: YOU WILL NO LONGER BE TAKING AMLODIPINE AND TORSEMIDE   - you must follow up with your doctor to go over medication   - continue new dose of hydralazine 50mg oral every 8 hrs , labetalol 50mg oral twice daily .    Diagnosis: Anemia of chronic disease  Assessment and Plan of Treatment: follow up with your primary care doctor     PRINCIPAL DISCHARGE DIAGNOSIS  Diagnosis: Myxedema coma  Assessment and Plan of Treatment: you were monitored in the ICU due to your slow heart rate and hypothermia which was caused by hypothyroidism   - you were given IV steroids during your hospitalization   - MUST TAKE  your levothyroxine 112 mcg daily   - follow up in the endocrine clinic . Will need to have your thyroid function tests rechecked in 4-6 weeks   Please do not skip any doses of your medications      SECONDARY DISCHARGE DIAGNOSES  Diagnosis: Hypertension  Assessment and Plan of Treatment: YOU WILL NO LONGER BE TAKING AMLODIPINE AND TORSEMIDE   - you must follow up with your doctor to go over medication   - continue new dose of hydralazine 50mg oral every 8 hrs , labetalol 50mg oral twice daily.  - on this current regimen your blood pressures were well controlled in the hospital. Continue to check your pressures daily and follow up with your doctors in 1-2 weeks so that they can make additional adjustments as needed.  - if you experience headaches, dizziness, chest pain or vision changes please contact your physician or return to the hospital.    Diagnosis: Acute renal failure  Assessment and Plan of Treatment: You were noted to have a worsening of your kidney function in the hospital. Overall your kidney function improved. Please follow up with your primary care doctor for follow up testing to monitor your kidney function. Please schedule an appointment with your doctor within 1-2 weeks of discharge for follow up labs.    Diagnosis: Hyperglycemia  Assessment and Plan of Treatment: Monitor finger sticks pre-meal and bedtime, low salt, fat and carbohydrate diet, minimize glucose intake.  Exercise daily for at least 30 minutes and weight loss.  Follow up with primary care physician and endocrinologist for routine Hemoglobin A1C checks and management.  Follow up with your ophthalmologist for routine yearly vision exams.   - continue levemir 14 units at bedtime, and repaglinide 2mg three times daily    Diagnosis: Bradycardia  Assessment and Plan of Treatment: You were also noted to have a slow heart rate (known as bradycardia) in the hospital. We believe this was due to your low thyroid function. Your heart rate returned to normal after we restarted your thyroid medications. Please follow up with your primary care doctor within 1-2 weeks for further check ups.    Diagnosis: Dizziness  Assessment and Plan of Treatment:     Diagnosis: Anemia of chronic disease  Assessment and Plan of Treatment: follow up with your primary care doctor

## 2022-11-29 NOTE — DISCHARGE NOTE NURSING/CASE MANAGEMENT/SOCIAL WORK - PATIENT PORTAL LINK FT
You can access the FollowMyHealth Patient Portal offered by [facility] by registering at the following website: [email or web address]. By joining [facility]’s FollowMyHealth portal, you will also be able to view your health information using other applications (apps) compatible with our system.

## 2022-11-29 NOTE — DISCHARGE NOTE NURSING/CASE MANAGEMENT/SOCIAL WORK - NSSCNAMETXT_GEN_ALL_CORE
[facility] [phone] Nurse to visit on the day following discharge. Other appropriate services to be arranged thereafter. Please contact the home care agency at the above phone number if you have not heard from them by approximately 12 noon on the day after your hospital discharge.

## 2022-11-29 NOTE — DISCHARGE NOTE PROVIDER - ATTENDING ATTESTATION STATEMENT
resolved   I have personally seen and examined the patient. I have collaborated with and supervised the

## 2022-11-29 NOTE — PROGRESS NOTE ADULT - SUBJECTIVE AND OBJECTIVE BOX
ENDOCRINE FOLLOW UP     Chief Complaint: myxedema coma    History:     MEDICATIONS  (STANDING):  atorvastatin 40 milliGRAM(s) Oral at bedtime  chlorhexidine 2% Cloths 1 Application(s) Topical <User Schedule>  dextrose 5%. 1000 milliLiter(s) (50 mL/Hr) IV Continuous <Continuous>  dextrose 5%. 1000 milliLiter(s) (100 mL/Hr) IV Continuous <Continuous>  dextrose 50% Injectable 25 Gram(s) IV Push once  dextrose 50% Injectable 12.5 Gram(s) IV Push once  dextrose 50% Injectable 25 Gram(s) IV Push once  glucagon  Injectable 1 milliGRAM(s) IntraMuscular once  heparin   Injectable 5000 Unit(s) SubCutaneous every 8 hours  hydrALAZINE 50 milliGRAM(s) Oral every 8 hours  insulin glargine Injectable (LANTUS) 16 Unit(s) SubCutaneous at bedtime  insulin glargine Injectable (LANTUS) 12 Unit(s) SubCutaneous at bedtime  insulin lispro (ADMELOG) corrective regimen sliding scale   SubCutaneous at bedtime  insulin lispro (ADMELOG) corrective regimen sliding scale   SubCutaneous three times a day before meals  insulin lispro Injectable (ADMELOG) 9 Unit(s) SubCutaneous three times a day before meals  labetalol 50 milliGRAM(s) Oral two times a day  levothyroxine 112 MICROGram(s) Oral daily  mupirocin 2% Ointment 1 Application(s) Topical two times a day  potassium chloride    Tablet ER 40 milliEquivalent(s) Oral once    MEDICATIONS  (PRN):  dextrose Oral Gel 15 Gram(s) Oral once PRN Blood Glucose LESS THAN 70 milliGRAM(s)/deciliter      Allergies    No Known Allergies    Intolerances        ROS: All other systems reviewed and negative    PHYSICAL EXAM:  VITALS: T(C): 36.4 (11-29-22 @ 05:57)  T(F): 97.6 (11-29-22 @ 05:57), Max: 98.6 (11-28-22 @ 13:00)  HR: 65 (11-29-22 @ 05:57) (65 - 88)  BP: 146/68 (11-29-22 @ 05:57) (146/68 - 185/72)  RR:  (16 - 18)  SpO2:  (96% - 100%)  Wt(kg): --  GENERAL: NAD, resting comfortably   EYES: No proptosis,  anicteric  HEENT:  Atraumatic, Normocephalic, moist mucous membranes  RESPIRATORY: Nonlabored respirations on room air, normal rate/effort   CARDIOVASCULAR: Regular rate and rhythm; No murmurs  GI: Soft, nontender, non distended, normal bowel sounds  NEURO: AOx3, moves all extremities spontaneously  PSYCH:  reactive affect, euthymic mood    POCT Blood Glucose.: 211 mg/dL (11-29-22 @ 12:14)  POCT Blood Glucose.: 155 mg/dL (11-29-22 @ 08:49)  POCT Blood Glucose.: 98 mg/dL (11-28-22 @ 21:18)  POCT Blood Glucose.: 110 mg/dL (11-28-22 @ 17:56)  POCT Blood Glucose.: 124 mg/dL (11-28-22 @ 12:22)  POCT Blood Glucose.: 120 mg/dL (11-28-22 @ 09:04)  POCT Blood Glucose.: 295 mg/dL (11-27-22 @ 22:39)  POCT Blood Glucose.: 270 mg/dL (11-27-22 @ 18:07)  POCT Blood Glucose.: 329 mg/dL (11-27-22 @ 12:29)  POCT Blood Glucose.: 215 mg/dL (11-27-22 @ 08:37)  POCT Blood Glucose.: 291 mg/dL (11-26-22 @ 22:08)  POCT Blood Glucose.: 329 mg/dL (11-26-22 @ 17:49)  POCT Blood Glucose.: 304 mg/dL (11-26-22 @ 16:45)      11-29    143  |  105  |  65<H>  ----------------------------<  160<H>  3.4<L>   |  26  |  1.93<H>    eGFR: 30<L>    Ca    9.1      11-29  Mg     1.90     11-29  Phos  3.5     11-29    TPro  6.3  /  Alb  3.6  /  TBili  0.4  /  DBili  x   /  AST  30  /  ALT  70<H>  /  AlkPhos  294<H>  11-28      A1C with Estimated Average Glucose Result: 9.7 % (11-25-22 @ 14:20)  A1C with Estimated Average Glucose Result: 9.1 % (09-29-22 @ 06:00)  A1C with Estimated Average Glucose Result: 9.2 % (09-28-22 @ 07:47)      Thyroid Stimulating Hormone, Serum: 40.80 uIU/mL (11-25-22 @ 09:57)   ENDOCRINE FOLLOW UP     Chief Complaint: myxedema coma    History:   The patient reports feeling well, no complaints. She brought her medications with her, including repaglinide 1mg tid before meals, atorvastatin 40mg daily and levothyroxine 50mcg daily. She reports only taking levemir insulin, usually 10 units at bedtime. I spoke with her that pharmacy gave her levemir 20 units which she said she can't take all of the time because her sugar isn't high enough. If her BG is 180 she will take 10 units, reports never takes 20 units. Reports am sugars 110s. She adamantly endorses only taking levemir insulin injection at night, adamantly denies other insulin including novolog which she was told to stop.    MEDICATIONS  (STANDING):  atorvastatin 40 milliGRAM(s) Oral at bedtime  chlorhexidine 2% Cloths 1 Application(s) Topical <User Schedule>  dextrose 5%. 1000 milliLiter(s) (50 mL/Hr) IV Continuous <Continuous>  dextrose 5%. 1000 milliLiter(s) (100 mL/Hr) IV Continuous <Continuous>  dextrose 50% Injectable 25 Gram(s) IV Push once  dextrose 50% Injectable 12.5 Gram(s) IV Push once  dextrose 50% Injectable 25 Gram(s) IV Push once  glucagon  Injectable 1 milliGRAM(s) IntraMuscular once  heparin   Injectable 5000 Unit(s) SubCutaneous every 8 hours  hydrALAZINE 50 milliGRAM(s) Oral every 8 hours  insulin glargine Injectable (LANTUS) 16 Unit(s) SubCutaneous at bedtime  insulin glargine Injectable (LANTUS) 12 Unit(s) SubCutaneous at bedtime  insulin lispro (ADMELOG) corrective regimen sliding scale   SubCutaneous at bedtime  insulin lispro (ADMELOG) corrective regimen sliding scale   SubCutaneous three times a day before meals  insulin lispro Injectable (ADMELOG) 9 Unit(s) SubCutaneous three times a day before meals  labetalol 50 milliGRAM(s) Oral two times a day  levothyroxine 112 MICROGram(s) Oral daily  mupirocin 2% Ointment 1 Application(s) Topical two times a day  potassium chloride    Tablet ER 40 milliEquivalent(s) Oral once    MEDICATIONS  (PRN):  dextrose Oral Gel 15 Gram(s) Oral once PRN Blood Glucose LESS THAN 70 milliGRAM(s)/deciliter      Allergies    No Known Allergies    Intolerances        ROS: All other systems reviewed and negative    PHYSICAL EXAM:  VITALS: T(C): 36.4 (11-29-22 @ 05:57)  T(F): 97.6 (11-29-22 @ 05:57), Max: 98.6 (11-28-22 @ 13:00)  HR: 65 (11-29-22 @ 05:57) (65 - 88)  BP: 146/68 (11-29-22 @ 05:57) (146/68 - 185/72)  RR:  (16 - 18)  SpO2:  (96% - 100%)  Wt(kg): --  GENERAL: NAD, resting comfortably   EYES: No proptosis,  anicteric  HEENT:  Atraumatic, Normocephalic, moist mucous membranes  RESPIRATORY: Nonlabored respirations on room air, normal rate/effort   CARDIOVASCULAR: Regular rate and rhythm; No murmurs  GI: Soft, nontender, non distended, normal bowel sounds  NEURO: Answering questions appropriately, moves all extremities spontaneously  PSYCH:  reactive affect, euthymic mood    POCT Blood Glucose.: 211 mg/dL (11-29-22 @ 12:14)  POCT Blood Glucose.: 155 mg/dL (11-29-22 @ 08:49)  POCT Blood Glucose.: 98 mg/dL (11-28-22 @ 21:18)  POCT Blood Glucose.: 110 mg/dL (11-28-22 @ 17:56)  POCT Blood Glucose.: 124 mg/dL (11-28-22 @ 12:22)  POCT Blood Glucose.: 120 mg/dL (11-28-22 @ 09:04)  POCT Blood Glucose.: 295 mg/dL (11-27-22 @ 22:39)  POCT Blood Glucose.: 270 mg/dL (11-27-22 @ 18:07)  POCT Blood Glucose.: 329 mg/dL (11-27-22 @ 12:29)  POCT Blood Glucose.: 215 mg/dL (11-27-22 @ 08:37)  POCT Blood Glucose.: 291 mg/dL (11-26-22 @ 22:08)  POCT Blood Glucose.: 329 mg/dL (11-26-22 @ 17:49)  POCT Blood Glucose.: 304 mg/dL (11-26-22 @ 16:45)      11-29    143  |  105  |  65<H>  ----------------------------<  160<H>  3.4<L>   |  26  |  1.93<H>    eGFR: 30<L>    Ca    9.1      11-29  Mg     1.90     11-29  Phos  3.5     11-29    TPro  6.3  /  Alb  3.6  /  TBili  0.4  /  DBili  x   /  AST  30  /  ALT  70<H>  /  AlkPhos  294<H>  11-28      A1C with Estimated Average Glucose Result: 9.7 % (11-25-22 @ 14:20)  A1C with Estimated Average Glucose Result: 9.1 % (09-29-22 @ 06:00)  A1C with Estimated Average Glucose Result: 9.2 % (09-28-22 @ 07:47)      Thyroid Stimulating Hormone, Serum: 40.80 uIU/mL (11-25-22 @ 09:57)

## 2022-11-29 NOTE — DISCHARGE NOTE PROVIDER - NSDCFUADDAPPT_GEN_ALL_CORE_FT
Please follow up with your primary care doctor within 1-2 weeks of discharge for further medication adjustments and for repeat blood work.     Please follow up with Dr. Coyle (kidney specialist), you already have an appointment scheduled on 12/02/22 at [address] [place] [address].

## 2022-11-30 LAB
CULTURE RESULTS: SIGNIFICANT CHANGE UP
CULTURE RESULTS: SIGNIFICANT CHANGE UP
SPECIMEN SOURCE: SIGNIFICANT CHANGE UP
SPECIMEN SOURCE: SIGNIFICANT CHANGE UP

## 2022-12-02 ENCOUNTER — APPOINTMENT (OUTPATIENT)
Dept: NEPHROLOGY | Facility: CLINIC | Age: 57
End: 2022-12-02

## 2022-12-02 VITALS
WEIGHT: 130 LBS | RESPIRATION RATE: 16 BRPM | SYSTOLIC BLOOD PRESSURE: 120 MMHG | TEMPERATURE: 97.7 F | OXYGEN SATURATION: 98 % | BODY MASS INDEX: 23.92 KG/M2 | HEART RATE: 87 BPM | HEIGHT: 62 IN | DIASTOLIC BLOOD PRESSURE: 72 MMHG

## 2022-12-02 DIAGNOSIS — N18.32 CHRONIC KIDNEY DISEASE, STAGE 3B: ICD-10-CM

## 2022-12-02 LAB — T4 FREE SERPL-MCNC: 1.1 NG/DL — SIGNIFICANT CHANGE UP

## 2022-12-02 PROCEDURE — 99214 OFFICE O/P EST MOD 30 MIN: CPT | Mod: GC

## 2022-12-02 RX ORDER — LABETALOL HYDROCHLORIDE 200 MG/1
200 TABLET, FILM COATED ORAL
Qty: 180 | Refills: 0 | Status: DISCONTINUED | COMMUNITY
Start: 2022-08-08

## 2022-12-02 RX ORDER — LEVOTHYROXINE SODIUM 0.11 MG/1
112 TABLET ORAL
Qty: 30 | Refills: 0 | Status: ACTIVE | COMMUNITY
Start: 2022-11-29

## 2022-12-02 RX ORDER — BLOOD-GLUCOSE METER
31G X 8 MM EACH MISCELLANEOUS
Qty: 100 | Refills: 0 | Status: DISCONTINUED | COMMUNITY
Start: 2022-11-04

## 2022-12-02 RX ORDER — BLOOD SUGAR DIAGNOSTIC
STRIP MISCELLANEOUS
Qty: 100 | Refills: 0 | Status: DISCONTINUED | COMMUNITY
Start: 2022-11-04

## 2022-12-02 RX ORDER — GABAPENTIN 300 MG/1
300 CAPSULE ORAL
Qty: 30 | Refills: 0 | Status: DISCONTINUED | COMMUNITY
Start: 2022-06-30

## 2022-12-02 RX ORDER — AMLODIPINE BESYLATE 10 MG/1
10 TABLET ORAL
Qty: 90 | Refills: 0 | Status: DISCONTINUED | COMMUNITY
Start: 2022-09-22

## 2022-12-02 RX ORDER — INSULIN DETEMIR 100 [IU]/ML
100 INJECTION, SOLUTION SUBCUTANEOUS
Qty: 15 | Refills: 0 | Status: DISCONTINUED | COMMUNITY
Start: 2022-07-28

## 2022-12-02 RX ORDER — METFORMIN HYDROCHLORIDE 500 MG/1
500 TABLET, COATED ORAL
Qty: 60 | Refills: 0 | Status: DISCONTINUED | COMMUNITY
Start: 2022-09-30

## 2022-12-02 RX ORDER — HYDRALAZINE HYDROCHLORIDE 100 MG/1
100 TABLET ORAL
Qty: 90 | Refills: 0 | Status: DISCONTINUED | COMMUNITY
Start: 2022-10-07

## 2022-12-02 RX ORDER — HYDROCHLOROTHIAZIDE 12.5 MG/1
12.5 TABLET ORAL
Qty: 30 | Refills: 0 | Status: DISCONTINUED | COMMUNITY
Start: 2022-08-23

## 2022-12-02 RX ORDER — CLOTRIMAZOLE AND BETAMETHASONE DIPROPIONATE 10; .5 MG/G; MG/G
1-0.05 CREAM TOPICAL
Qty: 45 | Refills: 0 | Status: DISCONTINUED | COMMUNITY
Start: 2022-06-07

## 2022-12-02 RX ORDER — HYDROCHLOROTHIAZIDE 25 MG/1
25 TABLET ORAL
Qty: 90 | Refills: 0 | Status: DISCONTINUED | COMMUNITY
Start: 2022-09-12

## 2022-12-02 RX ORDER — CLONIDINE HYDROCHLORIDE 0.2 MG/1
0.2 TABLET ORAL
Qty: 180 | Refills: 0 | Status: DISCONTINUED | COMMUNITY
Start: 2022-10-03

## 2022-12-02 RX ORDER — VALSARTAN AND HYDROCHLOROTHIAZIDE 320; 25 MG/1; MG/1
320-25 TABLET, FILM COATED ORAL
Qty: 30 | Refills: 0 | Status: DISCONTINUED | COMMUNITY
Start: 2022-06-10

## 2022-12-02 RX ORDER — ATORVASTATIN CALCIUM 40 MG/1
40 TABLET, FILM COATED ORAL
Qty: 90 | Refills: 0 | Status: ACTIVE | COMMUNITY
Start: 2022-09-12

## 2022-12-02 RX ORDER — REPAGLINIDE 1 MG/1
1 TABLET ORAL
Qty: 90 | Refills: 0 | Status: DISCONTINUED | COMMUNITY
Start: 2022-11-03

## 2022-12-02 RX ORDER — ISOPROPYL ALCOHOL 70 ML/100ML
70 SWAB TOPICAL
Qty: 100 | Refills: 0 | Status: DISCONTINUED | COMMUNITY
Start: 2022-11-04

## 2022-12-02 RX ORDER — AMLODIPINE BESYLATE 5 MG/1
5 TABLET ORAL
Qty: 90 | Refills: 0 | Status: DISCONTINUED | COMMUNITY
Start: 2022-09-30

## 2022-12-02 RX ORDER — SITAGLIPTIN 25 MG/1
25 TABLET, FILM COATED ORAL
Qty: 30 | Refills: 0 | Status: DISCONTINUED | COMMUNITY
Start: 2022-06-01

## 2022-12-02 RX ORDER — LEVOTHYROXINE SODIUM 0.05 MG/1
50 TABLET ORAL
Qty: 30 | Refills: 0 | Status: DISCONTINUED | COMMUNITY
Start: 2022-11-03

## 2022-12-02 RX ORDER — PANTOPRAZOLE 40 MG/1
40 TABLET, DELAYED RELEASE ORAL
Qty: 30 | Refills: 0 | Status: DISCONTINUED | COMMUNITY
Start: 2022-09-30

## 2022-12-02 RX ORDER — LANCETS 33 GAUGE
EACH MISCELLANEOUS
Qty: 100 | Refills: 0 | Status: DISCONTINUED | COMMUNITY
Start: 2022-11-04

## 2022-12-02 NOTE — PHYSICAL EXAM
[General Appearance - Alert] : alert [General Appearance - Well Nourished] : well nourished [General Appearance - Well Developed] : well developed [Outer Ear] : the ears and nose were normal in appearance [Jugular Venous Distention Increased] : there was no jugular-venous distention [Auscultation Breath Sounds / Voice Sounds] : lungs were clear to auscultation bilaterally [Heart Sounds] : normal S1 and S2 [Heart Sounds Gallop] : no gallops [Abdomen Soft] : soft [No CVA Tenderness] : no ~M costovertebral angle tenderness [Nail Clubbing] : no clubbing  or cyanosis of the fingernails [] : no rash [Oriented To Time, Place, And Person] : oriented to person, place, and time [Sclera] : the sclera and conjunctiva were normal [General Appearance - In No Acute Distress] : in no acute distress [Neck Appearance] : the appearance of the neck was normal [Respiration, Rhythm And Depth] : normal respiratory rhythm and effort [Abdomen Tenderness] : non-tender [FreeTextEntry1] : B/L LE: pitting edema

## 2022-12-02 NOTE — HISTORY OF PRESENT ILLNESS
[FreeTextEntry1] : 57-year-old female with history of longstanding HTN, DM, anemia and CKD presents to clinic for initial follow-up visit after recent hospital stay.  \par \par On review of [facility], pt. noted to have recent 2 hospitalization at [facility]. Initial hospitalization at [facility] was for CHF exacerbation (9/27/22- 10/7/22). Pt was seen by inpatient nephrology team. Scr was elevated at 1.4 on admission on 9/27/22. Pt. received IV diuretic therapy for fluid overload. Scr increased to 2.07 on 10/7/22. Pt was rehospitalized at [facility] in November 2022 (11/25/22- 11/29/22) for bradycardia and hypothyroidism. Scr was elevated at 2.16 on admission on 11/25/22. Pt. also had elevated serum potassium level of 6, received medical management. Scr decreased to 1.93 on discharge on 11/29/22. UA showed glucosuria with moderate proteinuria done on 11/25/22. Spot urine TP/CR was elevated at 1.4 on 10/2/22. Kidney sonogram done on 9/28/22 showed bilateral increased echogenicity but no renal masses, hydronephrosis, or calculi. Oral diuretic was discontinued during recent hospital stay at [facility].    \par \par Pt. currently feels well but gives history of B/L LE edema. No fever, CP, SOB, HA or dizziness.

## 2022-12-02 NOTE — END OF VISIT
[FreeTextEntry3] : I was physically present for the key portions of the evaluation and management (E/M) service provided. I agree with the above history, ROS, physical exam, assessment, and plan which I have reviewed and edited where appropriate. I have also reviewed the assessment and management of CKD and HTN with the patient and her daughter during clinic visit today.\par \par Pt. with CKD stage 3b in setting of DM and HTN. BP in acceptable range during clinic visit today. Pt. with significant B/L LE edema on exam today. Pt. advised to resume oral diuretic therapy. Monitor BP and labs. Avoid nephrotoxins.

## 2022-12-02 NOTE — REVIEW OF SYSTEMS
[Lower Ext Edema] : lower extremity edema [Limb Swelling] : limb swelling [As Noted in HPI] : as noted in HPI [Fever] : no fever [Chills] : no chills [Feeling Poorly] : not feeling poorly [Feeling Tired] : not feeling tired [Eye Pain] : no eye pain [Earache] : no earache [Sore Throat] : no sore throat [Chest Pain] : no chest pain [Palpitations] : no palpitations [Shortness Of Breath] : no shortness of breath [Cough] : no cough [Abdominal Pain] : no abdominal pain [Constipation] : no constipation [Dysuria] : no dysuria [Itching] : no itching [Confused] : no confusion [Dizziness] : no dizziness [Anxiety] : no anxiety [de-identified] : DM, hypothyroidism

## 2022-12-02 NOTE — ASSESSMENT
[FreeTextEntry1] : 1.CKD stage 3b: Pt. with CKD in setting in setting of DM and HTN. On review of [facility], pt. noted to have recent 2 hospitalization at [facility]. Initial hospitalization at [facility] was for CHF exacerbation (9/27/22-10/7/22). Pt was seen by inpatient nephrology team. Scr was elevated at 1.4 on admission on 9/27/22. Pt. received IV diuretic therapy for fluid overload. Scr increased to 2.07 on 10/7/22.  Pt was rehospitalized at [facility] in November 2022 (11/25/22-11/29/22) for bradycardia. Scr was elevated at 2.16 on admission on 11/25/22. Scr decreased to 1.93 on discharge on 11/29/22. Spot urine TP/CR was elevated at 1.4 on 10/2/22. Kidney sonogram done on 9/28/22 showed bilateral increased echogenicity but no renal masses, hydronephrosis, or calculi. Importance of good glycemic and BP control reviewed with patient. Advised patient to avoid NSAIDs, RCAs, and other nephrotoxins. Monitor renal function. \par \par 2. HTN: BP in acceptable range during office visit. Low salt diet advised. Monitor BP. \par \par 3. LE edema: Pt. with significant bilateral LE pitting edema on exam today. Advised to resume oral diuretic therapy (Torsemide 20 mg daily). Low salt diet and fluid restriction advised. Monitor daily weights. \par \par \par Follow-up in 6 weeks.

## 2022-12-05 LAB — T4 FREE SERPL DIALY-MCNC: 1.3 NG/DL — SIGNIFICANT CHANGE UP

## 2022-12-16 ENCOUNTER — APPOINTMENT (OUTPATIENT)
Dept: ENDOCRINOLOGY | Facility: CLINIC | Age: 57
End: 2022-12-16

## 2023-01-16 ENCOUNTER — INPATIENT (INPATIENT)
Facility: HOSPITAL | Age: 58
LOS: 14 days | Discharge: HOME HEALTH | End: 2023-01-31
Attending: STUDENT IN AN ORGANIZED HEALTH CARE EDUCATION/TRAINING PROGRAM | Admitting: STUDENT IN AN ORGANIZED HEALTH CARE EDUCATION/TRAINING PROGRAM
Payer: COMMERCIAL

## 2023-01-16 VITALS
OXYGEN SATURATION: 100 % | SYSTOLIC BLOOD PRESSURE: 174 MMHG | TEMPERATURE: 99 F | RESPIRATION RATE: 17 BRPM | HEIGHT: 60 IN | HEART RATE: 73 BPM | DIASTOLIC BLOOD PRESSURE: 86 MMHG

## 2023-01-16 LAB
ALBUMIN SERPL ELPH-MCNC: 3.7 G/DL — SIGNIFICANT CHANGE UP (ref 3.3–5)
ALP SERPL-CCNC: 311 U/L — HIGH (ref 40–120)
ALT FLD-CCNC: 34 U/L — HIGH (ref 4–33)
ANION GAP SERPL CALC-SCNC: 13 MMOL/L — SIGNIFICANT CHANGE UP (ref 7–14)
APTT BLD: 37.3 SEC — HIGH (ref 27–36.3)
AST SERPL-CCNC: 37 U/L — HIGH (ref 4–32)
B-OH-BUTYR SERPL-SCNC: 0.6 MMOL/L — HIGH (ref 0–0.4)
BASE EXCESS BLDV CALC-SCNC: -8.8 MMOL/L — LOW (ref -2–3)
BASOPHILS # BLD AUTO: 0.02 K/UL — SIGNIFICANT CHANGE UP (ref 0–0.2)
BASOPHILS NFR BLD AUTO: 0.3 % — SIGNIFICANT CHANGE UP (ref 0–2)
BILIRUB SERPL-MCNC: 0.4 MG/DL — SIGNIFICANT CHANGE UP (ref 0.2–1.2)
BLD GP AB SCN SERPL QL: NEGATIVE — SIGNIFICANT CHANGE UP
BLOOD GAS VENOUS COMPREHENSIVE RESULT: SIGNIFICANT CHANGE UP
BUN SERPL-MCNC: 22 MG/DL — SIGNIFICANT CHANGE UP (ref 7–23)
CALCIUM SERPL-MCNC: 8.8 MG/DL — SIGNIFICANT CHANGE UP (ref 8.4–10.5)
CHLORIDE BLDV-SCNC: 92 MMOL/L — LOW (ref 96–108)
CHLORIDE SERPL-SCNC: 90 MMOL/L — LOW (ref 98–107)
CO2 BLDV-SCNC: 20.6 MMOL/L — LOW (ref 22–26)
CO2 SERPL-SCNC: 17 MMOL/L — LOW (ref 22–31)
CREAT SERPL-MCNC: 1.05 MG/DL — SIGNIFICANT CHANGE UP (ref 0.5–1.3)
D DIMER BLD IA.RAPID-MCNC: 965 NG/ML DDU — HIGH
EGFR: 62 ML/MIN/1.73M2 — SIGNIFICANT CHANGE UP
EOSINOPHIL # BLD AUTO: 0.06 K/UL — SIGNIFICANT CHANGE UP (ref 0–0.5)
EOSINOPHIL NFR BLD AUTO: 0.8 % — SIGNIFICANT CHANGE UP (ref 0–6)
FLUAV AG NPH QL: SIGNIFICANT CHANGE UP
FLUBV AG NPH QL: SIGNIFICANT CHANGE UP
GAS PNL BLDV: 119 MMOL/L — CRITICAL LOW (ref 136–145)
GLUCOSE BLDV-MCNC: 192 MG/DL — HIGH (ref 70–99)
GLUCOSE SERPL-MCNC: 196 MG/DL — HIGH (ref 70–99)
HCO3 BLDV-SCNC: 19 MMOL/L — LOW (ref 22–29)
HCT VFR BLD CALC: 22 % — LOW (ref 34.5–45)
HCT VFR BLDA CALC: 23 % — LOW (ref 34.5–46.5)
HGB BLD CALC-MCNC: 7.6 G/DL — LOW (ref 11.5–15.5)
HGB BLD-MCNC: 7.1 G/DL — LOW (ref 11.5–15.5)
IANC: 5.98 K/UL — SIGNIFICANT CHANGE UP (ref 1.8–7.4)
IMM GRANULOCYTES NFR BLD AUTO: 2.3 % — HIGH (ref 0–0.9)
INR BLD: 1.03 RATIO — SIGNIFICANT CHANGE UP (ref 0.88–1.16)
LACTATE BLDV-MCNC: 1.2 MMOL/L — SIGNIFICANT CHANGE UP (ref 0.5–2)
LYMPHOCYTES # BLD AUTO: 0.53 K/UL — LOW (ref 1–3.3)
LYMPHOCYTES # BLD AUTO: 7.5 % — LOW (ref 13–44)
MAGNESIUM SERPL-MCNC: 1.7 MG/DL — SIGNIFICANT CHANGE UP (ref 1.6–2.6)
MCHC RBC-ENTMCNC: 25.4 PG — LOW (ref 27–34)
MCHC RBC-ENTMCNC: 32.3 GM/DL — SIGNIFICANT CHANGE UP (ref 32–36)
MCV RBC AUTO: 78.9 FL — LOW (ref 80–100)
MONOCYTES # BLD AUTO: 0.35 K/UL — SIGNIFICANT CHANGE UP (ref 0–0.9)
MONOCYTES NFR BLD AUTO: 4.9 % — SIGNIFICANT CHANGE UP (ref 2–14)
NEUTROPHILS # BLD AUTO: 5.98 K/UL — SIGNIFICANT CHANGE UP (ref 1.8–7.4)
NEUTROPHILS NFR BLD AUTO: 84.2 % — HIGH (ref 43–77)
NRBC # BLD: 0 /100 WBCS — SIGNIFICANT CHANGE UP (ref 0–0)
NRBC # FLD: 0 K/UL — SIGNIFICANT CHANGE UP (ref 0–0)
NT-PROBNP SERPL-SCNC: 1654 PG/ML — HIGH
PCO2 BLDV: 51 MMHG — HIGH (ref 39–42)
PH BLDV: 7.18 — LOW (ref 7.32–7.43)
PLATELET # BLD AUTO: 122 K/UL — LOW (ref 150–400)
PO2 BLDV: 54 MMHG — SIGNIFICANT CHANGE UP
POTASSIUM BLDV-SCNC: 5 MMOL/L — SIGNIFICANT CHANGE UP (ref 3.5–5.1)
POTASSIUM SERPL-MCNC: 5.1 MMOL/L — SIGNIFICANT CHANGE UP (ref 3.5–5.3)
POTASSIUM SERPL-SCNC: 5.1 MMOL/L — SIGNIFICANT CHANGE UP (ref 3.5–5.3)
PROT SERPL-MCNC: 6.9 G/DL — SIGNIFICANT CHANGE UP (ref 6–8.3)
PROTHROM AB SERPL-ACNC: 11.9 SEC — SIGNIFICANT CHANGE UP (ref 10.5–13.4)
RBC # BLD: 2.79 M/UL — LOW (ref 3.8–5.2)
RBC # FLD: 15.2 % — HIGH (ref 10.3–14.5)
RH IG SCN BLD-IMP: POSITIVE — SIGNIFICANT CHANGE UP
RSV RNA NPH QL NAA+NON-PROBE: SIGNIFICANT CHANGE UP
SAO2 % BLDV: 82.9 % — SIGNIFICANT CHANGE UP
SARS-COV-2 RNA SPEC QL NAA+PROBE: SIGNIFICANT CHANGE UP
SODIUM SERPL-SCNC: 120 MMOL/L — CRITICAL LOW (ref 135–145)
TROPONIN T, HIGH SENSITIVITY RESULT: 26 NG/L — SIGNIFICANT CHANGE UP
TSH SERPL-MCNC: 2.56 UIU/ML — SIGNIFICANT CHANGE UP (ref 0.27–4.2)
WBC # BLD: 7.1 K/UL — SIGNIFICANT CHANGE UP (ref 3.8–10.5)
WBC # FLD AUTO: 7.1 K/UL — SIGNIFICANT CHANGE UP (ref 3.8–10.5)

## 2023-01-16 PROCEDURE — 99285 EMERGENCY DEPT VISIT HI MDM: CPT

## 2023-01-16 PROCEDURE — 71045 X-RAY EXAM CHEST 1 VIEW: CPT | Mod: 26

## 2023-01-16 RX ORDER — REPAGLINIDE 1 MG/1
2 TABLET ORAL ONCE
Refills: 0 | Status: COMPLETED | OUTPATIENT
Start: 2023-01-16 | End: 2023-01-16

## 2023-01-16 RX ORDER — LABETALOL HCL 100 MG
50 TABLET ORAL ONCE
Refills: 0 | Status: COMPLETED | OUTPATIENT
Start: 2023-01-16 | End: 2023-01-16

## 2023-01-16 RX ORDER — LEVOTHYROXINE SODIUM 125 MCG
125 TABLET ORAL DAILY
Refills: 0 | Status: DISCONTINUED | OUTPATIENT
Start: 2023-01-16 | End: 2023-01-18

## 2023-01-16 RX ORDER — ATORVASTATIN CALCIUM 80 MG/1
40 TABLET, FILM COATED ORAL AT BEDTIME
Refills: 0 | Status: DISCONTINUED | OUTPATIENT
Start: 2023-01-16 | End: 2023-01-20

## 2023-01-16 RX ORDER — HYDRALAZINE HCL 50 MG
50 TABLET ORAL ONCE
Refills: 0 | Status: COMPLETED | OUTPATIENT
Start: 2023-01-16 | End: 2023-01-16

## 2023-01-16 RX ORDER — IPRATROPIUM/ALBUTEROL SULFATE 18-103MCG
3 AEROSOL WITH ADAPTER (GRAM) INHALATION
Refills: 0 | Status: COMPLETED | OUTPATIENT
Start: 2023-01-16 | End: 2023-01-16

## 2023-01-16 RX ADMIN — Medication 3 MILLILITER(S): at 20:18

## 2023-01-16 RX ADMIN — Medication 3 MILLILITER(S): at 20:53

## 2023-01-16 RX ADMIN — Medication 3 MILLILITER(S): at 21:00

## 2023-01-16 RX ADMIN — ATORVASTATIN CALCIUM 40 MILLIGRAM(S): 80 TABLET, FILM COATED ORAL at 22:39

## 2023-01-16 NOTE — ED ADULT TRIAGE NOTE - CHIEF COMPLAINT QUOTE
pt c/o shortness of breath beginning this morning. no chest pain, cough, palpitations. no respiratory distress noted in triage. hx. HTN, DM, HLD

## 2023-01-16 NOTE — ED ADULT NURSE NOTE - OBJECTIVE STATEMENT
56y/o F presents to ED with c/o SOB x this morning. Pt used inhaler TX @ home without relief which prompted ED visit. Pt denies CP, n/v/d, fevers, chills, cough. Received pt with IV access in place, labs collected+sent, and nebulizer TX in progress. 2 more neb TX to be administered. Daughter at bedside. Safety maintained.

## 2023-01-16 NOTE — ED PROVIDER NOTE - PHYSICAL EXAMINATION
General: Mildly uncomfortable in room.  HEENT: NCAT, PERRL  Cardiac: RRR, no murmurs, 2+ peripheral pulses  Chest: CTA  Abdomen: soft, non-distended, bowel sounds present, no ttp, no rebound or guarding  Extremities: mild +1 pitting peripheral edema. No calf tenderness, or leg size discrepancies  Skin:  no rashes  Neuro: AAOx4, 5+motor, sensory grossly intact  Psych: mood and affect appropriate

## 2023-01-16 NOTE — ED PROVIDER NOTE - OBJECTIVE STATEMENT
57-year-old female  pmhx of recent admission for myxedema coma from December 16, 2022 to January 4, 2023, hypothyroidism, hypertension, diabetes, hyperlipidemia, CKD, pulmonary hypertension, ?asthma (on albuterol) comes to ED w/ shortness of breath.  Patient was recently admitted at [facility] for myxedema coma, delirium, multiorgan failure patient was intubated at that time, received vasopressors, and broad-spectrum antibiotics.  Patient and patient family member unsure if an infection was ever found, patient eventually improved during admission and was discharged home.  Patient reports that today she started randomly experiencing shortness of breath, they took their albuterol nebulizer earlier today multiple times which did not help with symptoms.  Due to the persistence symptoms of symptoms they decided to come into the emergency department. Their pain/symptom is moderate, constant, non mediating with rest or albuterol use. Started randomly. Denies headache, focal weakness, chest pain, nausea, vomiting, diarrhea, abdominal pain, urinary symptoms, stool symptoms, skin changes.  Denies blood thinner use.  Reports receiving 3 COVID shots last in 2021, is not vaccinated for flu.

## 2023-01-16 NOTE — ED PROVIDER NOTE - CLINICAL SUMMARY MEDICAL DECISION MAKING FREE TEXT BOX
Impression: 57-year-old female  pmhx of recent admission for myxedema coma from December 16, 2022 to January 4, 2023, hypothyroidism, hypertension, diabetes, hyperlipidemia, CKD, pulmonary hypertension, ?asthma/COPD (on albuterol), ?CHF (previously on lasix for unclear reason) comes to ED w/ shortness of breath. Their symptoms and exam findings of 57-year-old female  pmhx of recent admission for myxedema coma from December 16, 2022 to January 4, 2023, hypothyroidism, hypertension, diabetes, hyperlipidemia, CKD, pulmonary hypertension, ?asthma (on albuterol) comes to ED w/ shortness of breath are concerning for viral illness, asthma/copd exacerbation, CHF exacerbation. D-dimer sent for PE eval.     Ordered labs, imaging, medications for diagnosis, management, and treatment.

## 2023-01-16 NOTE — ED PROVIDER NOTE - NSICDXPASTMEDICALHX_GEN_ALL_CORE_FT
PAST MEDICAL HISTORY:  CKD (chronic kidney disease)     DM (diabetes mellitus)     H/O pulmonary hypertension     HLD (hyperlipidemia)     HTN (hypertension)     Hypothyroid

## 2023-01-16 NOTE — ED PROVIDER NOTE - ATTENDING CONTRIBUTION TO CARE
57-year-old female  pmhx of recent admission for myxedema coma from December 16, 2022 to January 4, 2023, hypothyroidism, hypertension, diabetes, hyperlipidemia, CKD, pulmonary hypertension, ?asthma (on albuterol) comes to ED w/ shortness of breath x1 day. On exam she has diffuse wheezing and rhonchorous breath sounds, increased work of breathing with accessory muscle use, normal O2 sat but slightly tachypneic and dyspneic with speaking.  Abdomen soft nontender, moving all 4 extremities.  Given trial of nebulizer without much improvement, will place on BiPAP for work of breathing.  Patient also noted to be hyponatremic to 120, at this time unclear etiology as patient does not appear to be overloaded, will send urine studies.  Also noted to be retaining with PCO2 of 50, will place on BiPAP. Also noted to have elevated d-dimer- will obtain CTA r/o PE.  We will closely monitor respiratory status and will need admission.

## 2023-01-16 NOTE — ED PROVIDER NOTE - NS ED ROS FT
Constitutional:  no fevers, chills  HEENT: no cough, rhinorrhea  Cardiac: no chest pain, palpitations  Respiratory: SOB  GI: no n/v, abd pain, bloody or dark stools  :  no dysuria, frequency, or hematuria  MSK:  no joint pain  Skin:  no rashes  Neuro:  no headache, change in vision, weakness  Psych:  negative

## 2023-01-16 NOTE — ED ADULT TRIAGE NOTE - PRO INTERPRETER NEED 2
DERMATOLOGY  NOTE - LESION OF CONCERN    Chief Complaint   Patient presents with   • Derm Problem     lesion left check        HISTORY OF PRESENT ILLNESS:   The patient is a very pleasant 89 year old female with no prior history of skin cancers or pre-cancers presenting today for lesion(s) of concern as below.  The patient presents as a new patient in consultation from Spencer Michele MD.     Lesion(s) of concern:    #1  Location: left check  Description: white, elongated lesion  First noticed: 5 months  Changing over time (better/worse/stable/waxes and wanes?): larger over time   Symptoms: asymptomatic  Modifying factors (what makes it better or worse?): worsens as she twists on it  Prior treatment (and was it effective?):none     Similar, longer-standing lesion on the right upper arm also reported.     Few specific questions about asymptomatic lesions on the trunk.    Sun protective habits:  The patient uses sunscreen of at least SPF 30   [x] Yes   [] No  The patient avoids the peak sun hours of 10 am to 3 pm [x] Yes   [] No  The patient wears sun protective clothing including hats [] Yes   [x] No    Personal history of skin cancers (details):  None    Personal history of Colon and Breast Cancer     Skin cancer risk factors/Relevant family, past medical and social history:  Family history of melanoma:     [] Yes   [x] No    Details:  Family history of non-melanoma skin cancer:   [] Yes   [x] No    Details:  Family history of pancreatic cancer:   [] Yes   [x] No    Details:  Personal history of blistering sunburns:   [] Yes   [x] No    Details:  Personal history of tanning bed use (# times):    [] Yes   [x] No    Details:  Extensive sun exposure through occupation/hobby: [] Yes   [x] No    Details:  Personal history of immunosuppression:        [x] Yes   [] No    Details:  Personal history of chemotherapy +/- radiation:    [x] Yes   [] No    Details:  Current occupation: retired    REVIEW OF  SYSTEMS:  Constitutional:  In general the patient feels well:  [x] Yes   [] No  Integument:    The patient denies any other skin concerns:  [x] Yes   [] No  Cardiovascular:  The patient has a pacemaker:  [] Yes   [x] No     The patient has a defibrillator:  [] Yes   [x] No  Hematologic:   The patient bleeds easily because of being on aspirin or an anticoagulant:  [] Yes   [x] No    The patient is pregnant:           [] Yes   [] No    [x] Not applicable.  The patient is breast feeding:  [] Yes   [] No    [x] Not applicable.    ALLERGIES:   Allergen Reactions   • Allergy      Feathers/Down    • Darvocet [Propoxyphene N-Apap]    • Dye [Contrast Media]      IVP dye    • Niacin    • No Name Available Other (See Comments)     Feathers: Can't breathe   • Penicillins RASH   • Sulfa Antibiotics HIVES   • Tape [Adhesive   (Environmental)]        Current Outpatient Medications   Medication Sig Dispense Refill   • amLODIPine (NORVASC) 5 MG tablet Take 1 tablet by mouth daily. 90 tablet 0   • chlorpheniramine (CHLOR-TRIMETON) 4 MG tablet Take 4 mg by mouth every 6 hours as needed for Allergies.     • hyoscyamine (LEVSIN SL) 0.125 MG sublingual tablet Place 1 tablet under the tongue every 4 hours. 30 tablet 3   • carbidopa-levodopa (SINEMET)  MG per tablet Take one and ONE-HALF tablets by mouth every morning and noon, and one tab every evening. 120 tablet 6   • predniSONE (DELTASONE) 5 MG tablet Take 1 tablet by mouth daily. 90 tablet 1   • metoPROLOL succinate (TOPROL-XL) 200 MG 24 hr tablet Take 1 tablet by mouth daily. 90 tablet 0   • losartan-hydrochlorothiazide (HYZAAR) 100-25 MG per tablet Take 1 tablet by mouth daily. 90 tablet 0   • insulin glargine (Lantus SoloStar) 100 UNIT/ML pen-injector Inject 26 Units into the skin nightly. Prime 2 units before each dose. 15 mL 12   • traMADol (ULTRAM) 50 MG tablet Take 1 tablet by mouth every 6 hours as needed for pain. 30 tablet 1   • simvastatin (ZOCOR) 10 MG tablet Take 1  tablet by mouth nightly. 90 tablet 3   • Lancets (onetouch ultrasoft) Misc Test twice daily. 100 each 4   • metFORMIN (GLUCOPHAGE) 500 MG tablet Take 1 tablet by mouth daily (with breakfast). 90 tablet 3   • blood glucose test strip Test blood sugars 2 times daily as needed. 100 each 12   • Insulin Pen Needle (UltiCare Mini Pen Needles) 31G X 6 MM Misc Use 1 time daily 100 each 11   • bisacodyl (DULCOLAX) 5 MG EC tablet Take by mouth daily as needed for Constipation.     • docusate calcium (SURFAK) 240 MG capsule Take 240 mg by mouth daily.     • simethicone (MYLICON) 125 MG chewable tablet Chew 125 mg by mouth every 6 hours as needed for Flatulence.     • ferrous gluconate (FERGON) 240 (27 FE) MG Tab Take by mouth daily.     • DISPENSE Pain relieving foot cream     • cyanocobalamin (VITAMIN B-12) 500 MCG tablet Take 500 mcg by mouth daily.       No current facility-administered medications for this visit.       Past Medical History:   Diagnosis Date   • Asymptomatic stenosis of right carotid artery 3/10/2017   • Breast cancer (CMS/HCC)    • Cataracts, bilateral    • CKD (chronic kidney disease), stage III (CMS/HCC)    • Closed fracture of left hip with routine healing 2/20/2017   • Colon cancer (CMS/HCC)    • Diabetes mellitus (CMS/HCC)    • Graves' disease    • HTN (hypertension)    • Hyperlipidemia    • Osteopenia 12/17/2004   • Parkinson's disease (CMS/HCC) 2/20/2017   • Pharyngeal dysphagia 2/20/2017       PHYSICAL EXAMINATION:  An examination of the following areas was performed:    [x] Patient well developed and well groomed  [x] Appropriate mood and affect    [] Examination of oropharynx  [] Palpation of lymph nodes    Inspection of:  [] Scalp and hair  [x] Head and face  [x] Neck  [x] Chest  [x] Abdomen  [] Back  [] Right upper extremity  [] Left upper extremity  [] Buttocks, no genitalia [] Buttocks and genitalia [] Groin  [] Right lower extremity  [] Left lower extremity    Exam was notable for:     Tan  to brown, waxy, stuck-on appearing papules/plaques on the trunk    Few symmetric small pink papules with reassuring features on dermoscopy (when assessed) scattered on the trunk    A. Left cheek 1.1 cm crateriform, exophytic pink papule    B. Right arm 1.5 x 1.0 cm keratotic pink papule with red slightly indurated rim      ASSESSMENT AND PLAN:    Seborrheic keratoses. Discussed benign nature of lesions, reassurance provided.  No need for further treatment unless symptoms develop.    Benign Melanocytic Nevi. Discussed benign nature of lesions, reassurance provided.  No need for further treatment.  Encouraged monitoring at home for concerning changes, return to clinic if concerns arise.    Neoplasm of uncertain behavior of skin x 1  A) Location: left cheek  - Ddx (differential diagnosis): SCC-KA vs KA  - Shave biopsy today (see procedure note below)    Procedure note: Verbal consent obtained after discussion of risks and benefits of shave biopsy including scar formation, bleeding, and infection.  Possible need for further treatment pending results was discussed with patient.  The area was cleansed with alcohol and the site was anesthetized with 1% lidocaine with 1:100,000 epinephrine.  The specimen was obtained with shave technique.  Minimal bleeding was stopped with electrocautery and aluminum chloride. The specimen was collected and submitted for a dermatopathologist to read. Vaseline ointment and bandage was applied.  The patient was instructed in wound care in oral and written forms. The patient will be notified of the results and treatment plan.    Neoplasm of uncertain behavior of skin x 1  B) Location: Right upper arm  - Ddx (differential diagnosis): SCC vs very large HAK  - Shave biopsy today (see procedure note below)    Procedure note: Verbal consent obtained after discussion of risks and benefits of shave biopsy including scar formation, bleeding, and infection.  Possible need for further treatment pending  results was discussed with patient.  The area was cleansed with alcohol and the site was anesthetized with 1 mL 1% lidocaine with 1:100,000 epinephrine.  The specimen was obtained with shave technique.  Minimal bleeding was stopped with aluminum chloride and electrocautery. The specimen was collected and submitted to dermatopathology. Petrolatum ointment and bandage was applied.  The patient was instructed in wound care in oral and written forms.     Discussed if lesions return as skin cancer (as expected), would recommend screening FBSE for other lesions of concern.      On 4/15/2021, Dinah ALANIS MA, scribed the services personally performed by Jocelien Donnelly MD.    I, Joceline Donnelly MD, attest that the documentation recorded by the scribe accurately and completely reflects the service(s) I personally performed and the decisions made by me. I also reviewed and verified the scribe's note, which I edited as appropriate.      English

## 2023-01-17 DIAGNOSIS — E87.4 MIXED DISORDER OF ACID-BASE BALANCE: ICD-10-CM

## 2023-01-17 DIAGNOSIS — J96.01 ACUTE RESPIRATORY FAILURE WITH HYPOXIA: ICD-10-CM

## 2023-01-17 DIAGNOSIS — R65.10 SYSTEMIC INFLAMMATORY RESPONSE SYNDROME (SIRS) OF NON-INFECTIOUS ORIGIN WITHOUT ACUTE ORGAN DYSFUNCTION: ICD-10-CM

## 2023-01-17 DIAGNOSIS — E78.5 HYPERLIPIDEMIA, UNSPECIFIED: ICD-10-CM

## 2023-01-17 DIAGNOSIS — Z91.89 OTHER SPECIFIED PERSONAL RISK FACTORS, NOT ELSEWHERE CLASSIFIED: ICD-10-CM

## 2023-01-17 DIAGNOSIS — D64.9 ANEMIA, UNSPECIFIED: ICD-10-CM

## 2023-01-17 DIAGNOSIS — Z29.9 ENCOUNTER FOR PROPHYLACTIC MEASURES, UNSPECIFIED: ICD-10-CM

## 2023-01-17 DIAGNOSIS — E03.9 HYPOTHYROIDISM, UNSPECIFIED: ICD-10-CM

## 2023-01-17 DIAGNOSIS — I10 ESSENTIAL (PRIMARY) HYPERTENSION: ICD-10-CM

## 2023-01-17 DIAGNOSIS — R06.02 SHORTNESS OF BREATH: ICD-10-CM

## 2023-01-17 DIAGNOSIS — E11.9 TYPE 2 DIABETES MELLITUS WITHOUT COMPLICATIONS: ICD-10-CM

## 2023-01-17 DIAGNOSIS — E11.65 TYPE 2 DIABETES MELLITUS WITH HYPERGLYCEMIA: ICD-10-CM

## 2023-01-17 DIAGNOSIS — E87.1 HYPO-OSMOLALITY AND HYPONATREMIA: ICD-10-CM

## 2023-01-17 LAB
ALBUMIN SERPL ELPH-MCNC: 3.4 G/DL — SIGNIFICANT CHANGE UP (ref 3.3–5)
ALP SERPL-CCNC: 276 U/L — HIGH (ref 40–120)
ALT FLD-CCNC: 31 U/L — SIGNIFICANT CHANGE UP (ref 4–33)
ANION GAP SERPL CALC-SCNC: 13 MMOL/L — SIGNIFICANT CHANGE UP (ref 7–14)
ANION GAP SERPL CALC-SCNC: 14 MMOL/L — SIGNIFICANT CHANGE UP (ref 7–14)
APPEARANCE UR: CLEAR — SIGNIFICANT CHANGE UP
AST SERPL-CCNC: 30 U/L — SIGNIFICANT CHANGE UP (ref 4–32)
BACTERIA # UR AUTO: NEGATIVE — SIGNIFICANT CHANGE UP
BASE EXCESS BLDV CALC-SCNC: -10 MMOL/L — LOW (ref -2–3)
BASE EXCESS BLDV CALC-SCNC: -6.6 MMOL/L — LOW (ref -2–3)
BILIRUB SERPL-MCNC: 0.3 MG/DL — SIGNIFICANT CHANGE UP (ref 0.2–1.2)
BILIRUB UR-MCNC: NEGATIVE — SIGNIFICANT CHANGE UP
BLD GP AB SCN SERPL QL: NEGATIVE — SIGNIFICANT CHANGE UP
BLOOD GAS VENOUS COMPREHENSIVE RESULT: SIGNIFICANT CHANGE UP
BUN SERPL-MCNC: 23 MG/DL — SIGNIFICANT CHANGE UP (ref 7–23)
BUN SERPL-MCNC: 24 MG/DL — HIGH (ref 7–23)
CA-I SERPL-SCNC: 1.29 MMOL/L — SIGNIFICANT CHANGE UP (ref 1.15–1.33)
CALCIUM SERPL-MCNC: 9 MG/DL — SIGNIFICANT CHANGE UP (ref 8.4–10.5)
CALCIUM SERPL-MCNC: 9.1 MG/DL — SIGNIFICANT CHANGE UP (ref 8.4–10.5)
CHLORIDE BLDV-SCNC: 94 MMOL/L — LOW (ref 96–108)
CHLORIDE BLDV-SCNC: 94 MMOL/L — LOW (ref 96–108)
CHLORIDE SERPL-SCNC: 89 MMOL/L — LOW (ref 98–107)
CHLORIDE SERPL-SCNC: 92 MMOL/L — LOW (ref 98–107)
CHLORIDE UR-SCNC: 45 MMOL/L — SIGNIFICANT CHANGE UP
CO2 BLDV-SCNC: 18.6 MMOL/L — LOW (ref 22–26)
CO2 BLDV-SCNC: 21 MMOL/L — LOW (ref 22–26)
CO2 SERPL-SCNC: 17 MMOL/L — LOW (ref 22–31)
CO2 SERPL-SCNC: 18 MMOL/L — LOW (ref 22–31)
COLOR SPEC: SIGNIFICANT CHANGE UP
CREAT ?TM UR-MCNC: 41 MG/DL — SIGNIFICANT CHANGE UP
CREAT SERPL-MCNC: 1.17 MG/DL — SIGNIFICANT CHANGE UP (ref 0.5–1.3)
CREAT SERPL-MCNC: 1.35 MG/DL — HIGH (ref 0.5–1.3)
DIFF PNL FLD: NEGATIVE — SIGNIFICANT CHANGE UP
EGFR: 46 ML/MIN/1.73M2 — LOW
EGFR: 55 ML/MIN/1.73M2 — LOW
EPI CELLS # UR: 6 /HPF — HIGH (ref 0–5)
GAS PNL BLDV: 120 MMOL/L — CRITICAL LOW (ref 136–145)
GAS PNL BLDV: 121 MMOL/L — LOW (ref 136–145)
GAS PNL BLDV: SIGNIFICANT CHANGE UP
GLUCOSE BLDV-MCNC: 229 MG/DL — HIGH (ref 70–99)
GLUCOSE BLDV-MCNC: 70 MG/DL — SIGNIFICANT CHANGE UP (ref 70–99)
GLUCOSE SERPL-MCNC: 221 MG/DL — HIGH (ref 70–99)
GLUCOSE SERPL-MCNC: 74 MG/DL — SIGNIFICANT CHANGE UP (ref 70–99)
GLUCOSE UR QL: ABNORMAL
HCO3 BLDV-SCNC: 17 MMOL/L — LOW (ref 22–29)
HCO3 BLDV-SCNC: 20 MMOL/L — LOW (ref 22–29)
HCT VFR BLD CALC: 19.5 % — CRITICAL LOW (ref 34.5–45)
HCT VFR BLD CALC: 23.5 % — LOW (ref 34.5–45)
HCT VFR BLDA CALC: 21 % — CRITICAL LOW (ref 34.5–46.5)
HCT VFR BLDA CALC: 24 % — LOW (ref 34.5–46.5)
HGB BLD CALC-MCNC: 6.9 G/DL — CRITICAL LOW (ref 11.5–15.5)
HGB BLD CALC-MCNC: 8 G/DL — LOW (ref 11.5–15.5)
HGB BLD-MCNC: 6.5 G/DL — CRITICAL LOW (ref 11.5–15.5)
HGB BLD-MCNC: 7.9 G/DL — LOW (ref 11.5–15.5)
HYALINE CASTS # UR AUTO: 0 /LPF — SIGNIFICANT CHANGE UP (ref 0–7)
KETONES UR-MCNC: NEGATIVE — SIGNIFICANT CHANGE UP
LACTATE BLDV-MCNC: 0.9 MMOL/L — SIGNIFICANT CHANGE UP (ref 0.5–2)
LACTATE BLDV-MCNC: 1.6 MMOL/L — SIGNIFICANT CHANGE UP (ref 0.5–2)
LEUKOCYTE ESTERASE UR-ACNC: NEGATIVE — SIGNIFICANT CHANGE UP
MAGNESIUM SERPL-MCNC: 1.8 MG/DL — SIGNIFICANT CHANGE UP (ref 1.6–2.6)
MAGNESIUM SERPL-MCNC: 1.9 MG/DL — SIGNIFICANT CHANGE UP (ref 1.6–2.6)
MCHC RBC-ENTMCNC: 26.2 PG — LOW (ref 27–34)
MCHC RBC-ENTMCNC: 26.6 PG — LOW (ref 27–34)
MCHC RBC-ENTMCNC: 33.3 GM/DL — SIGNIFICANT CHANGE UP (ref 32–36)
MCHC RBC-ENTMCNC: 33.6 GM/DL — SIGNIFICANT CHANGE UP (ref 32–36)
MCV RBC AUTO: 78.6 FL — LOW (ref 80–100)
MCV RBC AUTO: 79.1 FL — LOW (ref 80–100)
NITRITE UR-MCNC: NEGATIVE — SIGNIFICANT CHANGE UP
NRBC # BLD: 0 /100 WBCS — SIGNIFICANT CHANGE UP (ref 0–0)
NRBC # BLD: 0 /100 WBCS — SIGNIFICANT CHANGE UP (ref 0–0)
NRBC # FLD: 0 K/UL — SIGNIFICANT CHANGE UP (ref 0–0)
NRBC # FLD: 0.02 K/UL — HIGH (ref 0–0)
OSMOLALITY UR: 287 MOSM/KG — SIGNIFICANT CHANGE UP (ref 50–1200)
PCO2 BLDV: 42 MMHG — SIGNIFICANT CHANGE UP (ref 39–42)
PCO2 BLDV: 44 MMHG — HIGH (ref 39–42)
PH BLDV: 7.2 — LOW (ref 7.32–7.43)
PH BLDV: 7.28 — LOW (ref 7.32–7.43)
PH UR: 6 — SIGNIFICANT CHANGE UP (ref 5–8)
PHOSPHATE 24H UR-MCNC: 33.9 MG/DL — SIGNIFICANT CHANGE UP
PHOSPHATE SERPL-MCNC: 4.5 MG/DL — SIGNIFICANT CHANGE UP (ref 2.5–4.5)
PHOSPHATE SERPL-MCNC: 4.8 MG/DL — HIGH (ref 2.5–4.5)
PLATELET # BLD AUTO: 125 K/UL — LOW (ref 150–400)
PLATELET # BLD AUTO: 99 K/UL — LOW (ref 150–400)
PO2 BLDV: 47 MMHG — SIGNIFICANT CHANGE UP
PO2 BLDV: 62 MMHG — SIGNIFICANT CHANGE UP
POTASSIUM BLDV-SCNC: 4.7 MMOL/L — SIGNIFICANT CHANGE UP (ref 3.5–5.1)
POTASSIUM BLDV-SCNC: 5 MMOL/L — SIGNIFICANT CHANGE UP (ref 3.5–5.1)
POTASSIUM SERPL-MCNC: 4.8 MMOL/L — SIGNIFICANT CHANGE UP (ref 3.5–5.3)
POTASSIUM SERPL-MCNC: 5.2 MMOL/L — SIGNIFICANT CHANGE UP (ref 3.5–5.3)
POTASSIUM SERPL-SCNC: 4.8 MMOL/L — SIGNIFICANT CHANGE UP (ref 3.5–5.3)
POTASSIUM SERPL-SCNC: 5.2 MMOL/L — SIGNIFICANT CHANGE UP (ref 3.5–5.3)
POTASSIUM UR-SCNC: 19.4 MMOL/L — SIGNIFICANT CHANGE UP
PROT ?TM UR-MCNC: 243 MG/DL — SIGNIFICANT CHANGE UP
PROT ?TM UR-MCNC: 243 MG/DL — SIGNIFICANT CHANGE UP
PROT SERPL-MCNC: 6.4 G/DL — SIGNIFICANT CHANGE UP (ref 6–8.3)
PROT UR-MCNC: ABNORMAL
PROT/CREAT UR-RTO: 5.9 RATIO — HIGH (ref 0–0.2)
RBC # BLD: 2.48 M/UL — LOW (ref 3.8–5.2)
RBC # BLD: 2.97 M/UL — LOW (ref 3.8–5.2)
RBC # FLD: 14.9 % — HIGH (ref 10.3–14.5)
RBC # FLD: 15.2 % — HIGH (ref 10.3–14.5)
RBC CASTS # UR COMP ASSIST: 4 /HPF — SIGNIFICANT CHANGE UP (ref 0–4)
RH IG SCN BLD-IMP: POSITIVE — SIGNIFICANT CHANGE UP
SAO2 % BLDV: 83.2 % — SIGNIFICANT CHANGE UP
SAO2 % BLDV: 91.6 % — SIGNIFICANT CHANGE UP
SODIUM SERPL-SCNC: 120 MMOL/L — CRITICAL LOW (ref 135–145)
SODIUM SERPL-SCNC: 123 MMOL/L — LOW (ref 135–145)
SODIUM UR-SCNC: 42 MMOL/L — SIGNIFICANT CHANGE UP
SP GR SPEC: 1.02 — SIGNIFICANT CHANGE UP (ref 1.01–1.05)
TROPONIN T, HIGH SENSITIVITY RESULT: 27 NG/L — SIGNIFICANT CHANGE UP
UROBILINOGEN FLD QL: SIGNIFICANT CHANGE UP
UUN UR-MCNC: 242.9 MG/DL — SIGNIFICANT CHANGE UP
WBC # BLD: 6.52 K/UL — SIGNIFICANT CHANGE UP (ref 3.8–10.5)
WBC # BLD: 6.72 K/UL — SIGNIFICANT CHANGE UP (ref 3.8–10.5)
WBC # FLD AUTO: 6.52 K/UL — SIGNIFICANT CHANGE UP (ref 3.8–10.5)
WBC # FLD AUTO: 6.72 K/UL — SIGNIFICANT CHANGE UP (ref 3.8–10.5)
WBC UR QL: 5 /HPF — SIGNIFICANT CHANGE UP (ref 0–5)

## 2023-01-17 PROCEDURE — 71275 CT ANGIOGRAPHY CHEST: CPT | Mod: 26,MA

## 2023-01-17 PROCEDURE — 99223 1ST HOSP IP/OBS HIGH 75: CPT

## 2023-01-17 PROCEDURE — 99222 1ST HOSP IP/OBS MODERATE 55: CPT

## 2023-01-17 RX ORDER — INSULIN LISPRO 100/ML
VIAL (ML) SUBCUTANEOUS
Refills: 0 | Status: DISCONTINUED | OUTPATIENT
Start: 2023-01-17 | End: 2023-01-31

## 2023-01-17 RX ORDER — DEXTROSE 50 % IN WATER 50 %
25 SYRINGE (ML) INTRAVENOUS ONCE
Refills: 0 | Status: DISCONTINUED | OUTPATIENT
Start: 2023-01-17 | End: 2023-01-31

## 2023-01-17 RX ORDER — DEXTROSE 50 % IN WATER 50 %
15 SYRINGE (ML) INTRAVENOUS ONCE
Refills: 0 | Status: DISCONTINUED | OUTPATIENT
Start: 2023-01-17 | End: 2023-01-31

## 2023-01-17 RX ORDER — DEXTROSE 50 % IN WATER 50 %
12.5 SYRINGE (ML) INTRAVENOUS ONCE
Refills: 0 | Status: DISCONTINUED | OUTPATIENT
Start: 2023-01-17 | End: 2023-01-31

## 2023-01-17 RX ORDER — INSULIN LISPRO 100/ML
VIAL (ML) SUBCUTANEOUS AT BEDTIME
Refills: 0 | Status: DISCONTINUED | OUTPATIENT
Start: 2023-01-17 | End: 2023-01-31

## 2023-01-17 RX ORDER — INSULIN GLARGINE 100 [IU]/ML
12 INJECTION, SOLUTION SUBCUTANEOUS AT BEDTIME
Refills: 0 | Status: DISCONTINUED | OUTPATIENT
Start: 2023-01-17 | End: 2023-01-18

## 2023-01-17 RX ORDER — INSULIN LISPRO 100/ML
VIAL (ML) SUBCUTANEOUS EVERY 6 HOURS
Refills: 0 | Status: DISCONTINUED | OUTPATIENT
Start: 2023-01-17 | End: 2023-01-17

## 2023-01-17 RX ORDER — SODIUM CHLORIDE 9 MG/ML
1000 INJECTION, SOLUTION INTRAVENOUS
Refills: 0 | Status: DISCONTINUED | OUTPATIENT
Start: 2023-01-17 | End: 2023-01-31

## 2023-01-17 RX ORDER — ONDANSETRON 8 MG/1
4 TABLET, FILM COATED ORAL EVERY 8 HOURS
Refills: 0 | Status: DISCONTINUED | OUTPATIENT
Start: 2023-01-17 | End: 2023-01-31

## 2023-01-17 RX ORDER — VANCOMYCIN HCL 1 G
1000 VIAL (EA) INTRAVENOUS ONCE
Refills: 0 | Status: COMPLETED | OUTPATIENT
Start: 2023-01-17 | End: 2023-01-18

## 2023-01-17 RX ORDER — INSULIN LISPRO 100/ML
4 VIAL (ML) SUBCUTANEOUS
Refills: 0 | Status: DISCONTINUED | OUTPATIENT
Start: 2023-01-17 | End: 2023-01-18

## 2023-01-17 RX ORDER — PIPERACILLIN AND TAZOBACTAM 4; .5 G/20ML; G/20ML
3.38 INJECTION, POWDER, LYOPHILIZED, FOR SOLUTION INTRAVENOUS ONCE
Refills: 0 | Status: COMPLETED | OUTPATIENT
Start: 2023-01-17 | End: 2023-01-17

## 2023-01-17 RX ORDER — PIPERACILLIN AND TAZOBACTAM 4; .5 G/20ML; G/20ML
3.38 INJECTION, POWDER, LYOPHILIZED, FOR SOLUTION INTRAVENOUS EVERY 8 HOURS
Refills: 0 | Status: DISCONTINUED | OUTPATIENT
Start: 2023-01-17 | End: 2023-01-20

## 2023-01-17 RX ORDER — FUROSEMIDE 40 MG
40 TABLET ORAL ONCE
Refills: 0 | Status: COMPLETED | OUTPATIENT
Start: 2023-01-17 | End: 2023-01-17

## 2023-01-17 RX ORDER — DEXTROSE 50 % IN WATER 50 %
12.5 SYRINGE (ML) INTRAVENOUS ONCE
Refills: 0 | Status: COMPLETED | OUTPATIENT
Start: 2023-01-17 | End: 2023-01-17

## 2023-01-17 RX ORDER — LANOLIN ALCOHOL/MO/W.PET/CERES
3 CREAM (GRAM) TOPICAL AT BEDTIME
Refills: 0 | Status: DISCONTINUED | OUTPATIENT
Start: 2023-01-17 | End: 2023-01-31

## 2023-01-17 RX ORDER — GLUCAGON INJECTION, SOLUTION 0.5 MG/.1ML
1 INJECTION, SOLUTION SUBCUTANEOUS ONCE
Refills: 0 | Status: DISCONTINUED | OUTPATIENT
Start: 2023-01-17 | End: 2023-01-31

## 2023-01-17 RX ORDER — ACETAMINOPHEN 500 MG
650 TABLET ORAL EVERY 6 HOURS
Refills: 0 | Status: DISCONTINUED | OUTPATIENT
Start: 2023-01-17 | End: 2023-01-31

## 2023-01-17 RX ADMIN — ATORVASTATIN CALCIUM 40 MILLIGRAM(S): 80 TABLET, FILM COATED ORAL at 22:22

## 2023-01-17 RX ADMIN — Medication 40 MILLIGRAM(S): at 12:14

## 2023-01-17 RX ADMIN — REPAGLINIDE 2 MILLIGRAM(S): 1 TABLET ORAL at 00:45

## 2023-01-17 RX ADMIN — PIPERACILLIN AND TAZOBACTAM 200 GRAM(S): 4; .5 INJECTION, POWDER, LYOPHILIZED, FOR SOLUTION INTRAVENOUS at 13:46

## 2023-01-17 RX ADMIN — Medication 1: at 13:46

## 2023-01-17 RX ADMIN — Medication 40 MILLIGRAM(S): at 06:56

## 2023-01-17 RX ADMIN — PIPERACILLIN AND TAZOBACTAM 25 GRAM(S): 4; .5 INJECTION, POWDER, LYOPHILIZED, FOR SOLUTION INTRAVENOUS at 22:23

## 2023-01-17 NOTE — CHART NOTE - NSCHARTNOTEFT_GEN_A_CORE
Patient w/ finger stick of 67. Mentating at baseline. All other VSS. Patient accepting oral intake. RN activated hypoglycemia protocol. Blood glucose corrected to > 100 s/p juice. Will encourage po intake. Will continue to monitor FS. Will continue to monitor closely.

## 2023-01-17 NOTE — H&P ADULT - PROBLEM SELECTOR PLAN 4
Unclear if infection is present  Patient hypothermic with respiratory distress/hypoxia  continue infectious workup, cultures ordered

## 2023-01-17 NOTE — H&P ADULT - PROBLEM SELECTOR PLAN 3
Patient hypothermic, 91.6 rectal  Possibly endocrinopathy considering recent diagnosis of myxedema coma vs infection  Will administer empiric antibiotics  Endocrine and ICU consulted, will follow up recs Patient hypothermic, 91.6 rectal  Possibly endocrinopathy considering recent diagnosis of myxedema coma vs infection  Will administer empiric antibiotics  Endocrine and ICU consulted, will follow up recs  Currently on warming blankets

## 2023-01-17 NOTE — H&P ADULT - ASSESSMENT
56 yo lady w/ recent myxedema coma (from December 16, 2022 to January 4, 2023), hypothyroidism, hypertension, diabetes, hyperlipidemia, CKD, pulmonary hypertension, asthma on albuterol presented to the ED w/respiratory distress.

## 2023-01-17 NOTE — H&P ADULT - PROBLEM SELECTOR PLAN 9
AG normal, lactate normal, etiology is unclear  follow up MICU and enodcrine AG normal, lactate normal, etiology is unclear  follow up MICU and endocrine

## 2023-01-17 NOTE — CONSULT NOTE ADULT - SUBJECTIVE AND OBJECTIVE BOX
CHIEF COMPLAINT:    HPI:  58 yo lady w/ hypothyroidism, HTN, T2DM, HLD, CKD, PAH, asthma on albuterol presented to the ED w/respiratory distress.  As per family, the patient was recently admitted at [facility] for myxedema coma, delirium, multiorgan failure patient was intubated at that time, received vasopressors, and broad-spectrum antibiotics as per dc paperwork. After seeing her primary care Dr. Fernandez yesterday she began to experience shortness of breath, which was not alleviated by her albuterol. At baseline patient does endorse some mild dyspnea on exertion which resolves with rest.    Denies headache, focal weakness, chest pain, nausea, vomiting, diarrhea, abdominal pain, urinary symptoms, stool symptoms, skin changes.  Denies blood thinner use.  Reports receiving 3 COVID shots last in , is not vaccinated for flu. As per daughter, there has been decreased PO intake since her last admission.     Collateral information was obtained from the patient's daughter Brittany Oconnell  and documented per primary team    PAST MEDICAL & SURGICAL HISTORY:  HTN (hypertension)      HLD (hyperlipidemia)      DM (diabetes mellitus)      Hypothyroid      H/O pulmonary hypertension      CKD (chronic kidney disease)      No significant past surgical history          FAMILY HISTORY:      SOCIAL HISTORY:  Mobility: No disability  Funcational status: Does ADL and iADLs  Substance Use (street drugs): ( x ) never used  (  ) other:  Tobacco Usage:  ( x  ) never smoked   (   ) former smoker   (   ) current smoker  (     ) pack year  Alcohol Usage: None       Allergies    No Known Allergies    Intolerances        HOME MEDICATIONS:    REVIEW OF SYSTEMS:  REVIEW OF SYSTEMS:    CONSTITUTIONAL: No weakness, fevers or chills  EYES: PEERLA  ENT: No visual changes;  No vertigo or throat pain   NECK: No pain or stiffness  RESPIRATORY: No cough, wheezing, hemoptysis; + Shortness of breath  CARDIOVASCULAR: No chest pain or palpitations  GASTROINTESTINAL: No abdominal or epigastric pain. No nausea, vomiting, or hematemesis; No diarrhea or constipation. No melena or hematochezia.  GENITOURINARY: No dysuria, frequency or hematuria  NEUROLOGICAL: No numbness or weakness  SKIN: No itching, rashes  Psych: No anxiety. No depression        OBJECTIVE:  ICU Vital Signs Last 24 Hrs  T(C): 34.7 (2023 12:45), Max: 37.1 (2023 18:21)  T(F): 94.4 (2023 12:45), Max: 98.8 (2023 18:21)  HR: 69 (2023 12:45) (63 - 86)  BP: 157/80 (2023 12:45) (118/81 - 174/86)  BP(mean): --  ABP: --  ABP(mean): --  RR: 13 (2023 12:45) (13 - 22)  SpO2: 100% (2023 12:45) (100% - 100%)    O2 Parameters below as of 2023 12:45  Patient On (Oxygen Delivery Method): nasal cannula  O2 Flow (L/min): 4            CAPILLARY BLOOD GLUCOSE      POCT Blood Glucose.: 182 mg/dL (2023 13:43)    VITALS:   T(C): 34.7 (23 @ 12:45), Max: 37.1 (23 @ 18:21)  HR: 69 (23 @ 12:45) (63 - 86)  BP: 157/80 (23 @ 12:45) (118/81 - 174/86)  RR: 13 (23 @ 12:45) (13 - 22)  SpO2: 100% (23 @ 12:45) (100% - 100%)    GENERAL: NAD, lying in bed comfortably. Not in respiratory distress. Speaking in full sentences without pauses in between sentences of pursed lips  HEAD:  Atraumatic, normocephalic  EYES: EOMI, PERRLA, conjunctiva and sclera clear  ENT: Moist mucous membranes  NECK: Supple, no JVD  HEART: Regular rate and rhythm, no murmurs, rubs, or gallops  LUNGS: Unlabored respirations on 3.5L NC Clear to auscultation bilaterally, no crackles, wheezing, or rhonchi  ABDOMEN: Soft, nontender, nondistended, +BS  EXTREMITIES: 2+ pitting edema. Peripheral pulses bilaterally. No clubbing, cyanosis  NERVOUS SYSTEM:  A&Ox3, no focal deficits   SKIN: No rashes or lesions  Psych: Normal. normal behavior. normal speech    HOSPITAL MEDICATIONS:  MEDICATIONS  (STANDING):  atorvastatin 40 milliGRAM(s) Oral at bedtime  dextrose 5%. 1000 milliLiter(s) (50 mL/Hr) IV Continuous <Continuous>  dextrose 5%. 1000 milliLiter(s) (100 mL/Hr) IV Continuous <Continuous>  dextrose 50% Injectable 25 Gram(s) IV Push once  dextrose 50% Injectable 12.5 Gram(s) IV Push once  dextrose 50% Injectable 25 Gram(s) IV Push once  glucagon  Injectable 1 milliGRAM(s) IntraMuscular once  insulin lispro (ADMELOG) corrective regimen sliding scale   SubCutaneous every 6 hours  levothyroxine 125 MICROGram(s) Oral daily  piperacillin/tazobactam IVPB.- 3.375 Gram(s) IV Intermittent once  piperacillin/tazobactam IVPB.. 3.375 Gram(s) IV Intermittent every 8 hours  vancomycin  IVPB 1000 milliGRAM(s) IV Intermittent once    MEDICATIONS  (PRN):  acetaminophen     Tablet .. 650 milliGRAM(s) Oral every 6 hours PRN Temp greater or equal to 38C (100.4F), Mild Pain (1 - 3)  aluminum hydroxide/magnesium hydroxide/simethicone Suspension 30 milliLiter(s) Oral every 4 hours PRN Dyspepsia  dextrose Oral Gel 15 Gram(s) Oral once PRN Blood Glucose LESS THAN 70 milliGRAM(s)/deciliter  melatonin 3 milliGRAM(s) Oral at bedtime PRN Insomnia  ondansetron Injectable 4 milliGRAM(s) IV Push every 8 hours PRN Nausea and/or Vomiting      LABS:                        6.5    6.52  )-----------( 99       ( 2023 07:10 )             19.5         120<LL>  |  89<L>  |  23  ----------------------------<  221<H>  4.8   |  17<L>  |  1.17    Ca    9.0      2023 07:10  Phos  4.5       Mg     1.80         TPro  6.4  /  Alb  3.4  /  TBili  0.3  /  DBili  x   /  AST  30  /  ALT  31  /  AlkPhos  276<H>      PT/INR - ( 2023 20:15 )   PT: 11.9 sec;   INR: 1.03 ratio         PTT - ( 2023 20:15 )  PTT:37.3 sec  Urinalysis Basic - ( 2023 09:00 )    Color: Light Yellow / Appearance: Clear / S.017 / pH: x  Gluc: x / Ketone: Negative  / Bili: Negative / Urobili: <2 mg/dL   Blood: x / Protein: 300 mg/dL / Nitrite: Negative   Leuk Esterase: Negative / RBC: 4 /HPF / WBC 5 /HPF   Sq Epi: x / Non Sq Epi: 6 /HPF / Bacteria: Negative        Venous Blood Gas:   @ 05:20  7.20/44/62/17/91.6  VBG Lactate: 1.6  Venous Blood Gas:   @ 20:15  7.18/51/54/19/82.9  VBG Lactate: 1.2      MICROBIOLOGY:     RADIOLOGY:  [ ] Reviewed and interpreted by me    EKG:      PROCEDURE:  CT Angiogram of the chest was obtained with intravenous contrast. Three   dimensional maximum intensity projection (MIP) images were generated.    FINDINGS:    PULMONARY ANGIOGRAM: Limited evaluation of some segmental and   subsegmental pulmonary arteries. There is no pulmonary embolism to the   level of the lobar arteries or within the visualized segmental and   subsegmental branches.    LYMPH NODES: No lymphadenopathy.    HEART/VASCULATURE: Cardiomegaly. Small pericardial effusion. Coronary   artery calcifications. Aortic calcifications.    AIRWAYS/LUNGS/PLEURA: The central airways are patent. Bilateral upper   lobe predominant interlobular septal thickening. Some patchy bilateral   groundglass opacities. Bibasilar compressive atelectasis. Moderate right   and small left pleural effusions.    UPPER ABDOMEN: Cholelithiasis.    BONES/SOFT TISSUES: Generalized soft tissue edema. Degenerative changes   of the spine.    IMPRESSION:    No pulmonary embolism within the visualized pulmonary arteries as above.    Bilateral upper lobe predominant interlobular septal thickening, patchy   bilateral groundglass opacities, and moderate right and small left   pleural effusions. Findings most likely represent pulmonary edema.    Cardiomegaly. Small pericardial effusion.    COMPARISON: Radiograph chest 2022    FINDINGS:  Heart/Vascular: Heart size is slightly enlarged.  Pulmonary: Small right pleural effusion. The left lung is clear. No   pneumothorax.  Bones: No acute osseous abnormalities    IMPRESSION:  Small right pleural effusion.

## 2023-01-17 NOTE — ED ADULT NURSE REASSESSMENT NOTE - NS ED NURSE REASSESS COMMENT FT1
Pt awake and alert, A&OX4, reports feeling SOB- positioned high fowlers position. MAR at bedside. Placed on yudy hugger at this time. Denies CP,  HA, N/V, palpitations, fatigue, dizziness, blurry vision. Remains on 4L NC. NSR on CM. NAD. MAR at bedside. Pt awake and alert, A&OX4, reports feeling SOB- positioned high fowlers position. MAR at bedside. Placed on yudy hugger at this time. No obvious bleeding at this time. No vaginal or rectal bleeding. Denies CP,  HA, N/V, palpitations, fatigue, dizziness, blurry vision. Remains on 4L NC. NSR on CM. NAD. MAR at bedside.

## 2023-01-17 NOTE — H&P ADULT - PROBLEM SELECTOR PLAN 2
No active bleeding noted, rectum without blood  Anemia workup added on to earlier labs  Patient will need transfusion as she may be having #Symptomatic anemia, hb is now below 7.

## 2023-01-17 NOTE — H&P ADULT - TIME BILLING
Patient is at high risk and may need care upgraded to an ICU setting.   Her core temperature is dangerously low and she is at high risk for deterioration and death.  I spoke extensively with the ICU team who will see her as well as the endocrine team- total time of conversations were >15 mins  I also saw and examined the patient at bedside and had spoke with her daughter via phone, I also reviewed the discharge summaries from [facility] total time at bedside was ~49 mins. Rest of the time was spent documenting and ordering medications.

## 2023-01-17 NOTE — CONSULT NOTE ADULT - SUBJECTIVE AND OBJECTIVE BOX
HPI:  56 yo lady w/ hypothyroidism, HTN, T2DM, HLD, CKD, PAH, asthma on albuterol presented to the ED w/respiratory distress.  As per family, the patient was recently admitted at [facility] for myxedema coma, delirium, multiorgan failure patient was intubated at that time, received vasopressors, and broad-spectrum antibiotics as per MI paperwork. After seeing her primary care Dr. Fernandez yesterday she began to experience shortness of breath, which was not alleviated by her albuterol. Denies headache, focal weakness, chest pain, nausea, vomiting, diarrhea, abdominal pain, urinary symptoms, stool symptoms, skin changes.  Denies blood thinner use. As per daughter, there has been decreased PO intake since her last admission. Most information was obtained from the patient's daughter Brittany Oconnell .  (17 Jan 2023 08:51)    Endocrine consulted for hypothyroidism and DM2.    Patient with hx of hypothyroidism. Was previously on 50mcg PO LT4 when seen at [facility] with bradycardia to 30s, hypothermia to 88F, hypotension requiring pressor support TSH 40 elevated, FT4 1.2 wnl w/ TT3 mildly low at 79. It was thought at the time that the patient had a clinical picture consistent with myxedema. She was treated with IV LT4 initially and was ultimately discharged on LT4 112mcg daily. She reports having taken that until she went to [facility], where they increased the dose to 125mcg PO daily 2 weeks ago. She has been taking this as prescribed. TSH here today is 2.56. FT4 pending. AM cortisol 21.2, ruling out adrenal insufficiency (was not given steroids in the hospital). Sodium is low at 120. BP elevated and HR in the 60s-70s. Did have hypothermia with improvement on yudy hugger. CTA showed small pericardial effusion and pulmonary edema. Reports fatigue since hospital discharge 2 weeks ago. No cold intolerance, syncope, CP, recent illness, constipation, weight gain.    Patient also with a hx of DM2. A1c 6.9. Has had DM for 23 years. Takes repaglinide 2mg TIDac and Tresiba 1-12 units depending on BG. Checks BG at bedtime. Sometimes BG in the 200s. No lows. Has a good appetite.    PAST MEDICAL & SURGICAL HISTORY:  HTN (hypertension)      HLD (hyperlipidemia)      DM (diabetes mellitus)      Hypothyroid      H/O pulmonary hypertension      CKD (chronic kidney disease)      No significant past surgical history          FAMILY HISTORY: Father with DM      Social History: No tobacco or etoh use    Outpatient Medications: Home Medications:  atorvastatin 40 mg oral tablet: 1 tab(s) orally once a day (at bedtime) (29 Nov 2022 16:07)  labetalol 100 mg oral tablet: 0.5 tab(s) orally 2 times a day (07 Oct 2022 12:42)  mupirocin 2% topical ointment: 1 application topically 2 times a day (29 Nov 2022 16:07)      MEDICATIONS  (STANDING):  atorvastatin 40 milliGRAM(s) Oral at bedtime  dextrose 5%. 1000 milliLiter(s) (50 mL/Hr) IV Continuous <Continuous>  dextrose 5%. 1000 milliLiter(s) (100 mL/Hr) IV Continuous <Continuous>  dextrose 50% Injectable 25 Gram(s) IV Push once  dextrose 50% Injectable 12.5 Gram(s) IV Push once  dextrose 50% Injectable 25 Gram(s) IV Push once  glucagon  Injectable 1 milliGRAM(s) IntraMuscular once  insulin lispro (ADMELOG) corrective regimen sliding scale   SubCutaneous three times a day before meals  insulin lispro (ADMELOG) corrective regimen sliding scale   SubCutaneous at bedtime  levothyroxine 125 MICROGram(s) Oral daily  piperacillin/tazobactam IVPB.- 3.375 Gram(s) IV Intermittent once  piperacillin/tazobactam IVPB.. 3.375 Gram(s) IV Intermittent every 8 hours  vancomycin  IVPB 1000 milliGRAM(s) IV Intermittent once    MEDICATIONS  (PRN):  acetaminophen     Tablet .. 650 milliGRAM(s) Oral every 6 hours PRN Temp greater or equal to 38C (100.4F), Mild Pain (1 - 3)  aluminum hydroxide/magnesium hydroxide/simethicone Suspension 30 milliLiter(s) Oral every 4 hours PRN Dyspepsia  dextrose Oral Gel 15 Gram(s) Oral once PRN Blood Glucose LESS THAN 70 milliGRAM(s)/deciliter  melatonin 3 milliGRAM(s) Oral at bedtime PRN Insomnia  ondansetron Injectable 4 milliGRAM(s) IV Push every 8 hours PRN Nausea and/or Vomiting      Allergies    No Known Allergies    Intolerances      Review of Systems:  Constitutional:  No fever, No chills. + fatigue  Eye:  No eye pain, No blurring.   Ear/Nose/Mouth/Throat:  No nasal congestion, No sore throat.   Respiratory:  No shortness of breath, No cough.   Cardiovascular:  No chest pain, No palpitations.   Gastrointestinal:  No nausea, No vomiting, No diarrhea.   Genitourinary:  No dysuria, No hematuria.   Endocrine:  No excessive thirst, No polyuria.   Musculoskeletal:  No back pain, No decreased range of motion.   Integumentary:  No rash, No skin lesion.   Neurologic:  Alert and oriented X4, No confusion.   Additional ROS reviewed and negative except as indicated in HPI.        PHYSICAL EXAM:  VITALS: T(C): 35.2 (01-17-23 @ 16:15)  T(F): 95.3 (01-17-23 @ 16:15), Max: 98.8 (01-16-23 @ 18:21)  HR: 76 (01-17-23 @ 16:15) (63 - 86)  BP: 144/86 (01-17-23 @ 16:15) (118/81 - 174/86)  RR:  (13 - 22)  SpO2:  (100% - 100%)  Wt(kg): --  General: Well-developed female, No acute distress, Speaking full sentences.   Eye:  Extraocular movements are intact, No proptosis or lid lag.   HENT:  Normocephalic.   Neck:  Supple, Non-tender.   Respiratory:  Respirations are non-labored, Symmetric chest wall expansion, Breath sounds are equal.   Cardiovascular:  Normal rate, Regular rhythm, No edema.  Gastrointestinal:  Soft, Non-tender, Non-distended.   Musculoskeletal:  Normal range of motion, No gross joint swelling.   Feet:  Normal by visual exam, Normal pulses, No ulcers.   Integumentary:  Warm, dry.  Mental Status Exam:  Orientedx4, Speech clear and coherent.   Neurologic:  Alert, Orientedx4, Normal motor function, No focal deficits, Cranial Nerves II-XII are grossly intact bilaterally.   Psychiatric:  Cooperative, Appropriate mood & affect.    POCT Blood Glucose.: 182 mg/dL (01-17-23 @ 13:43)  POCT Blood Glucose.: 209 mg/dL (01-17-23 @ 09:09)  POCT Blood Glucose.: 196 mg/dL (01-16-23 @ 18:27)                            6.5    6.52  )-----------( 99       ( 17 Jan 2023 07:10 )             19.5       01-17    120<LL>  |  89<L>  |  23  ----------------------------<  221<H>  4.8   |  17<L>  |  1.17    eGFR: 55<L>    Ca    9.0      01-17  Mg     1.80     01-17  Phos  4.5     01-17    TPro  6.4  /  Alb  3.4  /  TBili  0.3  /  DBili  x   /  AST  30  /  ALT  31  /  AlkPhos  276<H>  01-17      Thyroid Function Tests:  01-16 @ 20:15 TSH 2.56 FreeT4 -- T3 -- Anti TPO -- Anti Thyroglobulin Ab -- TSI --      11-25 Chol 107 Direct LDL -- LDL calculated 40 HDL 61 Trig 28        Radiology:

## 2023-01-17 NOTE — ED ADULT NURSE REASSESSMENT NOTE - NS ED NURSE REASSESS COMMENT FT1
First unit of PRBC started per order.  Pt resting comfortably. Awake and alert. A&OX4. Resp even and unlabored. Denies CP, SOB, N/V, palpitations. Educated on s/s of transfusion rxn. Consent in chart. Call bell in reach. Will continue to monitor.

## 2023-01-17 NOTE — H&P ADULT - HISTORY OF PRESENT ILLNESS
58 yo lady w/ recent myxedema coma (from December 16, 2022 to January 4, 2023), hypothyroidism, hypertension, diabetes, hyperlipidemia, CKD, pulmonary hypertension, asthma on albuterol presented to the ED w/respiratory distress.  As per family, the [facility]ema coma, delirium, multiorgan failure patient was intubated at that time, received vasopressors, and broad-spectrum antibiotics.  Patient and patient family member unsure if an infection was ever found, patient eventually improved during admission and was discharged home.  Patient reports that today she started randomly experiencing shortness of breath, they took their albuterol nebulizer earlier today multiple times which did not help with symptoms.  Due to the persistence symptoms of symptoms they decided to come into the emergency department. Their pain/symptom is moderate, constant, non mediating with rest or albuterol use. Started randomly. Denies headache, focal weakness, chest pain, nausea, vomiting, diarrhea, abdominal pain, urinary symptoms, stool symptoms, skin changes.  Denies blood thinner use.  Reports receiving 3 COVID shots last in 2021, is not vaccinated for flu.    PAST MEDICAL & SURGICAL HISTORY:  HTN (hypertension)      HLD (hyperlipidemia)      DM (diabetes mellitus)      Hypothyroid      H/O pulmonary hypertension      CKD (chronic kidney disease)      No significant past surgical history          Review of Systems:   CONSTITUTIONAL: No fever, weight loss, or fatigue  EYES: No eye pain, visual disturbances, or discharge  ENMT:  No difficulty hearing, tinnitus, vertigo; No sinus or throat pain  NECK: No pain or stiffness  BREASTS: No pain, masses, or nipple discharge  RESPIRATORY: Respiratory distress  CARDIOVASCULAR: No chest pain, palpitations, dizziness, or leg swelling  GASTROINTESTINAL: No abdominal or epigastric pain. No nausea, vomiting, or hematemesis; No diarrhea or constipation. No melena or hematochezia.  GENITOURINARY: No dysuria, frequency, hematuria, or incontinence  NEUROLOGICAL: No headaches, memory loss, loss of strength, numbness, or tremors  SKIN: No itching, burning, rashes, or lesions   LYMPH NODES: No enlarged glands  ENDOCRINE: No heat or cold intolerance; No hair loss  MUSCULOSKELETAL: No joint pain or swelling; No muscle, back, or extremity pain  PSYCHIATRIC: No depression, anxiety, mood swings, or difficulty sleeping  HEME/LYMPH: No easy bruising, or bleeding gums  ALLERGY AND IMMUNOLOGIC: No hives or eczema    Allergies    No Known Allergies    Intolerances        Social History: No recent alcohol/tobacco or drug use    FAMILY HISTORY: No pertinent family history       MEDICATIONS  (STANDING):  atorvastatin 40 milliGRAM(s) Oral at bedtime  dextrose 5%. 1000 milliLiter(s) (50 mL/Hr) IV Continuous <Continuous>  dextrose 5%. 1000 milliLiter(s) (100 mL/Hr) IV Continuous <Continuous>  dextrose 50% Injectable 25 Gram(s) IV Push once  dextrose 50% Injectable 12.5 Gram(s) IV Push once  dextrose 50% Injectable 25 Gram(s) IV Push once  glucagon  Injectable 1 milliGRAM(s) IntraMuscular once  insulin lispro (ADMELOG) corrective regimen sliding scale   SubCutaneous every 6 hours  levothyroxine 125 MICROGram(s) Oral daily    MEDICATIONS  (PRN):  dextrose Oral Gel 15 Gram(s) Oral once PRN Blood Glucose LESS THAN 70 milliGRAM(s)/deciliter      T(C): 36.6 (01-17-23 @ 03:41), Max: 37.1 (01-16-23 @ 18:21)  HR: 71 (01-17-23 @ 06:23) (71 - 86)  BP: 157/87 (01-17-23 @ 06:23) (118/81 - 174/86)  RR: 20 (01-17-23 @ 06:23) (17 - 20)  SpO2: 100% (01-17-23 @ 06:23) (100% - 100%)  Height (cm): 152.4 (01-16-23 @ 18:21)  CAPILLARY BLOOD GLUCOSE      POCT Blood Glucose.: 196 mg/dL (16 Jan 2023 18:27)    I&O's Summary      PHYSICAL EXAM:  GENERAL: NAD, well-developed  HEAD:  Atraumatic, Normocephalic  EYES: EOMI, PERRLA, conjunctiva and sclera clear  NECK: Supple, No elevated JVD  CHEST/LUNG: Clear to auscultation bilaterally; No wheeze  HEART: Regular rate and rhythm; No murmurs, rubs, or gallops  ABDOMEN: Soft, Nontender, Nondistended; Bowel sounds present  EXTREMITIES:  2+ Peripheral Pulses, No clubbing, cyanosis, or edema  PSYCH: AAOx3  NEUROLOGY: CN II-XII grossly intact, moving all extremities  SKIN: No rashes or lesions    LABS:                        6.5    6.52  )-----------( x        ( 17 Jan 2023 07:10 )             19.5     01-17    120<LL>  |  89<L>  |  23  ----------------------------<  221<H>  4.8   |  17<L>  |  1.17    Ca    9.0      17 Jan 2023 07:10  Phos  4.5     01-17  Mg     1.80     01-17    TPro  6.4  /  Alb  3.4  /  TBili  0.3  /  DBili  x   /  AST  30  /  ALT  31  /  AlkPhos  276<H>  01-17    PT/INR - ( 16 Jan 2023 20:15 )   PT: 11.9 sec;   INR: 1.03 ratio         PTT - ( 16 Jan 2023 20:15 )  PTT:37.3 sec            RADIOLOGY & ADDITIONAL TESTS:    ECG Personally Reviewed -     Imaging Personally Reviewed:    Consultant(s) Notes Reviewed:      Care Discussed with Consultants/Other Providers:   58 yo lady w/ hypothyroidism, HTN, T2DM, HLD, CKD, PAH, asthma on albuterol presented to the ED w/respiratory distress.  As per family, the patient was recently admitted at [facility] for myxedema coma, delirium, multiorgan failure patient was intubated at that time, received vasopressors, and broad-spectrum antibiotics as per dc paperwork. After seeing her primary care Dr. Fernandez yesterday she began to experience shortness of breath, which was not alleviated by her albuterol. Denies headache, focal weakness, chest pain, nausea, vomiting, diarrhea, abdominal pain, urinary symptoms, stool symptoms, skin changes.  Denies blood thinner use.  Reports receiving 3 COVID shots last in 2021, is not vaccinated for flu. As per daughter, there has been decreased PO intake since her last admission. Most information was obtained from the patient's daughter Brittany .     PAST MEDICAL & SURGICAL HISTORY:  HTN (hypertension)      HLD (hyperlipidemia)      DM (diabetes mellitus)      Hypothyroid      H/O pulmonary hypertension      CKD (chronic kidney disease)      No significant past surgical history          Review of Systems:   CONSTITUTIONAL: No fever, weight loss  EYES: No eye pain, visual disturbances, or discharge  ENMT:  No difficulty hearing, tinnitus, vertigo; No sinus or throat pain  NECK: No pain or stiffness  BREASTS: No pain, masses, or nipple discharge  RESPIRATORY: Respiratory distress  CARDIOVASCULAR: No chest pain, palpitations, dizziness, or leg swelling  GASTROINTESTINAL: No abdominal or epigastric pain. No nausea, vomiting, or hematemesis; No diarrhea or constipation. No melena or hematochezia.  GENITOURINARY: No dysuria, frequency, hematuria, or incontinence  NEUROLOGICAL: No headaches, memory loss, loss of strength, numbness, or tremors  SKIN: No itching, burning, rashes, or lesions   LYMPH NODES: No enlarged glands  ENDOCRINE: No heat or cold intolerance; No hair loss  MUSCULOSKELETAL: No joint pain or swelling; No muscle, back, or extremity pain  PSYCHIATRIC: No depression, anxiety, mood swings, or difficulty sleeping  HEME/LYMPH: No easy bruising, or bleeding gums  ALLERGY AND IMMUNOLOGIC: No hives or eczema    Allergies    No Known Allergies    Intolerances        Social History: No recent alcohol/tobacco or drug use    FAMILY HISTORY: No pertinent family history       MEDICATIONS  (STANDING):  atorvastatin 40 milliGRAM(s) Oral at bedtime  dextrose 5%. 1000 milliLiter(s) (50 mL/Hr) IV Continuous <Continuous>  dextrose 5%. 1000 milliLiter(s) (100 mL/Hr) IV Continuous <Continuous>  dextrose 50% Injectable 25 Gram(s) IV Push once  dextrose 50% Injectable 12.5 Gram(s) IV Push once  dextrose 50% Injectable 25 Gram(s) IV Push once  glucagon  Injectable 1 milliGRAM(s) IntraMuscular once  insulin lispro (ADMELOG) corrective regimen sliding scale   SubCutaneous every 6 hours  levothyroxine 125 MICROGram(s) Oral daily    MEDICATIONS  (PRN):  dextrose Oral Gel 15 Gram(s) Oral once PRN Blood Glucose LESS THAN 70 milliGRAM(s)/deciliter      T(C): 36.6 (01-17-23 @ 03:41), Max: 37.1 (01-16-23 @ 18:21)  HR: 71 (01-17-23 @ 06:23) (71 - 86)  BP: 157/87 (01-17-23 @ 06:23) (118/81 - 174/86)  RR: 20 (01-17-23 @ 06:23) (17 - 20)  SpO2: 100% (01-17-23 @ 06:23) (100% - 100%)  Height (cm): 152.4 (01-16-23 @ 18:21)  CAPILLARY BLOOD GLUCOSE      POCT Blood Glucose.: 196 mg/dL (16 Jan 2023 18:27)    I&O's Summary      PHYSICAL EXAM:  GENERAL: NAD, well-developed, on nasal canula  HEAD:  Atraumatic, Normocephalic  EYES: conjunctiva and sclera clear  NECK: Supple, No elevated JVD  CHEST/LUNG: Clear to auscultation bilaterally; No wheeze  HEART: Regular rate and rhythm; No murmurs, rubs, or gallops  ABDOMEN: Soft, Nontender, Nondistended; Bowel sounds present  EXTREMITIES:  2+ Peripheral Pulses, No clubbing, cyanosis, or edema  PSYCH: AAOx3, though somewhat slow to respond,   NEUROLOGY: CN II-XII grossly intact, moving all extremities  SKIN: No rashes or lesions    LABS:                        6.5    6.52  )-----------( x        ( 17 Jan 2023 07:10 )             19.5     01-17    120<LL>  |  89<L>  |  23  ----------------------------<  221<H>  4.8   |  17<L>  |  1.17    Ca    9.0      17 Jan 2023 07:10  Phos  4.5     01-17  Mg     1.80     01-17    TPro  6.4  /  Alb  3.4  /  TBili  0.3  /  DBili  x   /  AST  30  /  ALT  31  /  AlkPhos  276<H>  01-17    PT/INR - ( 16 Jan 2023 20:15 )   PT: 11.9 sec;   INR: 1.03 ratio         PTT - ( 16 Jan 2023 20:15 )  PTT:37.3 sec            RADIOLOGY & ADDITIONAL TESTS:    ECG Personally Reviewed -     Imaging Personally Reviewed:    Consultant(s) Notes Reviewed:      Care Discussed with Consultants/Other Providers:   Discussed with MICU team and endocrine teams  Discharge paper work from [facility] reviewed 56 yo lady w/ hypothyroidism, HTN, T2DM, HLD, CKD, PAH, asthma on albuterol presented to the ED w/respiratory distress.  As per family, the patient was recently admitted at [facility] for myxedema coma, delirium, multiorgan failure patient was intubated at that time, received vasopressors, and broad-spectrum antibiotics as per dc paperwork. After seeing her primary care Dr. Fernandez yesterday she began to experience shortness of breath, which was not alleviated by her albuterol. Denies headache, focal weakness, chest pain, nausea, vomiting, diarrhea, abdominal pain, urinary symptoms, stool symptoms, skin changes.  Denies blood thinner use. As per daughter, there has been decreased PO intake since her last admission. Most information was obtained from the patient's daughter Brittany .     PAST MEDICAL & SURGICAL HISTORY:  HTN (hypertension)      HLD (hyperlipidemia)      DM (diabetes mellitus)      Hypothyroid      H/O pulmonary hypertension      CKD (chronic kidney disease)      No significant past surgical history          Review of Systems:   CONSTITUTIONAL: No fever, weight loss  EYES: No eye pain, visual disturbances, or discharge  ENMT:  No difficulty hearing, tinnitus, vertigo; No sinus or throat pain  NECK: No pain or stiffness  RESPIRATORY: Respiratory distress  CARDIOVASCULAR: No chest pain, palpitations, dizziness, or leg swelling  GASTROINTESTINAL: No abdominal or epigastric pain. No nausea, vomiting, or hematemesis; No diarrhea or constipation. No melena or hematochezia.  GENITOURINARY: No dysuria, frequency, hematuria, or incontinence  NEUROLOGICAL: No headaches, memory loss, loss of strength, numbness, or tremors  SKIN: No itching, burning, rashes, or lesions   LYMPH NODES: No enlarged glands  ENDOCRINE: No heat or cold intolerance; No hair loss  MUSCULOSKELETAL: No joint pain or swelling; No muscle, back, or extremity pain  PSYCHIATRIC: No depression, anxiety, mood swings, or difficulty sleeping  HEME/LYMPH: No easy bruising, or bleeding gums  ALLERGY AND IMMUNOLOGIC: No hives or eczema    Allergies    No Known Allergies    Intolerances        Social History: No recent alcohol/tobacco or drug use    FAMILY HISTORY: No pertinent family history       MEDICATIONS  (STANDING):  atorvastatin 40 milliGRAM(s) Oral at bedtime  dextrose 5%. 1000 milliLiter(s) (50 mL/Hr) IV Continuous <Continuous>  dextrose 5%. 1000 milliLiter(s) (100 mL/Hr) IV Continuous <Continuous>  dextrose 50% Injectable 25 Gram(s) IV Push once  dextrose 50% Injectable 12.5 Gram(s) IV Push once  dextrose 50% Injectable 25 Gram(s) IV Push once  glucagon  Injectable 1 milliGRAM(s) IntraMuscular once  insulin lispro (ADMELOG) corrective regimen sliding scale   SubCutaneous every 6 hours  levothyroxine 125 MICROGram(s) Oral daily    MEDICATIONS  (PRN):  dextrose Oral Gel 15 Gram(s) Oral once PRN Blood Glucose LESS THAN 70 milliGRAM(s)/deciliter      T(C): 36.6 (01-17-23 @ 03:41), Max: 37.1 (01-16-23 @ 18:21)  HR: 71 (01-17-23 @ 06:23) (71 - 86)  BP: 157/87 (01-17-23 @ 06:23) (118/81 - 174/86)  RR: 20 (01-17-23 @ 06:23) (17 - 20)  SpO2: 100% (01-17-23 @ 06:23) (100% - 100%)  Height (cm): 152.4 (01-16-23 @ 18:21)  CAPILLARY BLOOD GLUCOSE      POCT Blood Glucose.: 196 mg/dL (16 Jan 2023 18:27)    I&O's Summary      PHYSICAL EXAM:  GENERAL: NAD, well-developed, on nasal canula  HEAD:  Atraumatic, Normocephalic  EYES: conjunctiva and sclera clear  NECK: Supple, No elevated JVD  CHEST/LUNG: Clear to auscultation bilaterally; No wheeze  HEART: Regular rate and rhythm; No murmurs, rubs, or gallops  ABDOMEN: Soft, Nontender, Nondistended; Bowel sounds present  EXTREMITIES:  2+ Peripheral Pulses, No clubbing, cyanosis, or edema  PSYCH: AAOx3, though somewhat slow to respond,   NEUROLOGY: CN II-XII grossly intact, moving all extremities  SKIN: No rashes or lesions    LABS:                        6.5    6.52  )-----------( x        ( 17 Jan 2023 07:10 )             19.5     01-17    120<LL>  |  89<L>  |  23  ----------------------------<  221<H>  4.8   |  17<L>  |  1.17    Ca    9.0      17 Jan 2023 07:10  Phos  4.5     01-17  Mg     1.80     01-17    TPro  6.4  /  Alb  3.4  /  TBili  0.3  /  DBili  x   /  AST  30  /  ALT  31  /  AlkPhos  276<H>  01-17    PT/INR - ( 16 Jan 2023 20:15 )   PT: 11.9 sec;   INR: 1.03 ratio         PTT - ( 16 Jan 2023 20:15 )  PTT:37.3 sec            RADIOLOGY & ADDITIONAL TESTS:    ECG Personally Reviewed -     Imaging Personally Reviewed:    Consultant(s) Notes Reviewed:      Care Discussed with Consultants/Other Providers:   Discussed with MICU team and endocrine teams  Discharge paper work from [facility] reviewed

## 2023-01-17 NOTE — H&P ADULT - PROBLEM SELECTOR PLAN 1
Patient now saturating well on NC  Noted to have pulmonary edema on CT chest with moderate pleural effusions R>L  ProBNP slightly elevated, though no JVD or B/L extremity edema noted, echo ordered  Anemia may be exacerbating this  Will continue lasix and follow up ICU consult

## 2023-01-17 NOTE — CONSULT NOTE ADULT - ASSESSMENT
56 yo lady w/ hypothyroidism, HTN, T2DM, HLD, CKD, PAH, asthma on albuterol presented to the ED w/respiratory distress. Endocrine consulted for hypothyroidism and DM2.    Hypothyroidism, not in myxedema  Taking LT4 125mcg PO as prescribed. TSH 2.56 on 1/17/23. FT4 pending.   -F/u FT4  -Continue 125mcg PO levothyroxine  -Low sodium and hypothermia unlikely to be related to hypothyroidism (or adrenal insufficiency). Recommend pursuing non-endocrine workup. Consider nephrology consult.    Controlled T2DM with hyperglycemia  Patient with a hx of DM2. A1c 6.9. Has had DM for 23 years. Takes repaglinide 2mg TIDac and Tresiba 1-12 units depending on BG. Checks BG at bedtime. Sometimes BG in the 200s. No lows. Has a good appetite.  -Hold non-insulin DM agents while inpatient  -Start Lantus 12 units at bedtime. DO NOT HOLD IF NPO.  -Start Admelog 4 units TID pre-meal. HOLD IF NPO.  -Use low dose Admelog correction scale pre-meal  -Use low dose Admelog correction scale at bedtime  -Fingerstick BG before meals and bedtime  -Goal -180  -Carbohydrate consistent diet  Discharge plan:  -Likely to discharge patient home on home regimen. Final regimen pending clinical course.  -Recommend routine outpatient ophthalmology and PCP versus endocrinology f/u    HLD  -On atorvastatin 40mg daily. Continue this if no contraindications.    José Antonio Carrasco DO, Endocrinology Fellow  For follow-up questions, discharge recommendations, or new consults please call answering service at [phone] (weekdays), [phone] (nights/weekends). For nonurgent matters, please email [email or web address] or [email or web address].

## 2023-01-17 NOTE — CONSULT NOTE ADULT - ASSESSMENT
58 yo lady w/ recent myxedema coma (from December 16, 2022 to January 4, 2023), hypothyroidism, hypertension, diabetes, hyperlipidemia, CKD, pulmonary hypertension, asthma on albuterol presented to the ED w/respiratory distress briefly requiring BIPAP but not on NC with adequate saturation. Patient also with Hypothermia with viraj of 91.6F. MICU consulted due to initially concerning situation with hypothermia which is currently being worked up appropriately    Recommendations:  - Patient not a MICU candidate at this time. Please reconsult as needed   - Rest of workup and management per Primary Team 58 yo lady w/ recent myxedema coma (from December 16, 2022 to January 4, 2023), hypothyroidism, hypertension, diabetes, hyperlipidemia, CKD, pulmonary hypertension, asthma on albuterol presented to the ED w/respiratory distress briefly requiring BIPAP but now on NC with adequate saturation. Patient also with Hypothermia with viraj of 91.6F. MICU consulted due to initially concerning situation with hypothermia which is currently being worked up appropriately    Recommendations:  - Patient not a MICU candidate at this time. Please reconsult as needed   - Rest of workup and management per Primary Team

## 2023-01-18 DIAGNOSIS — E87.1 HYPO-OSMOLALITY AND HYPONATREMIA: ICD-10-CM

## 2023-01-18 DIAGNOSIS — I10 ESSENTIAL (PRIMARY) HYPERTENSION: ICD-10-CM

## 2023-01-18 DIAGNOSIS — D64.9 ANEMIA, UNSPECIFIED: ICD-10-CM

## 2023-01-18 DIAGNOSIS — N18.9 CHRONIC KIDNEY DISEASE, UNSPECIFIED: ICD-10-CM

## 2023-01-18 DIAGNOSIS — J96.01 ACUTE RESPIRATORY FAILURE WITH HYPOXIA: ICD-10-CM

## 2023-01-18 DIAGNOSIS — A41.9 SEPSIS, UNSPECIFIED ORGANISM: ICD-10-CM

## 2023-01-18 DIAGNOSIS — N17.9 ACUTE KIDNEY FAILURE, UNSPECIFIED: ICD-10-CM

## 2023-01-18 DIAGNOSIS — Z29.9 ENCOUNTER FOR PROPHYLACTIC MEASURES, UNSPECIFIED: ICD-10-CM

## 2023-01-18 DIAGNOSIS — R06.03 ACUTE RESPIRATORY DISTRESS: ICD-10-CM

## 2023-01-18 DIAGNOSIS — E11.9 TYPE 2 DIABETES MELLITUS WITHOUT COMPLICATIONS: ICD-10-CM

## 2023-01-18 DIAGNOSIS — Z86.69 PERSONAL HISTORY OF OTHER DISEASES OF THE NERVOUS SYSTEM AND SENSE ORGANS: ICD-10-CM

## 2023-01-18 LAB
ALBUMIN SERPL ELPH-MCNC: 3.4 G/DL — SIGNIFICANT CHANGE UP (ref 3.3–5)
ALBUMIN SERPL ELPH-MCNC: 3.4 G/DL — SIGNIFICANT CHANGE UP (ref 3.3–5)
ALP SERPL-CCNC: 311 U/L — HIGH (ref 40–120)
ALP SERPL-CCNC: 323 U/L — HIGH (ref 40–120)
ALT FLD-CCNC: 33 U/L — SIGNIFICANT CHANGE UP (ref 4–33)
ALT FLD-CCNC: 34 U/L — HIGH (ref 4–33)
ANION GAP SERPL CALC-SCNC: 13 MMOL/L — SIGNIFICANT CHANGE UP (ref 7–14)
ANION GAP SERPL CALC-SCNC: 9 MMOL/L — SIGNIFICANT CHANGE UP (ref 7–14)
APPEARANCE UR: CLEAR — SIGNIFICANT CHANGE UP
AST SERPL-CCNC: 31 U/L — SIGNIFICANT CHANGE UP (ref 4–32)
AST SERPL-CCNC: 33 U/L — HIGH (ref 4–32)
BACTERIA # UR AUTO: NEGATIVE — SIGNIFICANT CHANGE UP
BASE EXCESS BLDV CALC-SCNC: -6.6 MMOL/L — LOW (ref -2–3)
BILIRUB SERPL-MCNC: 0.6 MG/DL — SIGNIFICANT CHANGE UP (ref 0.2–1.2)
BILIRUB SERPL-MCNC: 0.7 MG/DL — SIGNIFICANT CHANGE UP (ref 0.2–1.2)
BILIRUB UR-MCNC: NEGATIVE — SIGNIFICANT CHANGE UP
BLOOD GAS VENOUS COMPREHENSIVE RESULT: SIGNIFICANT CHANGE UP
BUN SERPL-MCNC: 24 MG/DL — HIGH (ref 7–23)
BUN SERPL-MCNC: 24 MG/DL — HIGH (ref 7–23)
CALCIUM SERPL-MCNC: 8.8 MG/DL — SIGNIFICANT CHANGE UP (ref 8.4–10.5)
CALCIUM SERPL-MCNC: 8.9 MG/DL — SIGNIFICANT CHANGE UP (ref 8.4–10.5)
CHLORIDE BLDV-SCNC: 95 MMOL/L — LOW (ref 96–108)
CHLORIDE SERPL-SCNC: 92 MMOL/L — LOW (ref 98–107)
CHLORIDE SERPL-SCNC: 92 MMOL/L — LOW (ref 98–107)
CO2 BLDV-SCNC: 20.3 MMOL/L — LOW (ref 22–26)
CO2 SERPL-SCNC: 18 MMOL/L — LOW (ref 22–31)
CO2 SERPL-SCNC: 20 MMOL/L — LOW (ref 22–31)
COLOR SPEC: COLORLESS — SIGNIFICANT CHANGE UP
CREAT SERPL-MCNC: 1.52 MG/DL — HIGH (ref 0.5–1.3)
CREAT SERPL-MCNC: 1.56 MG/DL — HIGH (ref 0.5–1.3)
CULTURE RESULTS: SIGNIFICANT CHANGE UP
DIFF PNL FLD: ABNORMAL
EGFR: 39 ML/MIN/1.73M2 — LOW
EGFR: 40 ML/MIN/1.73M2 — LOW
EPI CELLS # UR: 3 /HPF — SIGNIFICANT CHANGE UP (ref 0–5)
GAS PNL BLDV: 121 MMOL/L — LOW (ref 136–145)
GLUCOSE BLDC GLUCOMTR-MCNC: 169 MG/DL — HIGH (ref 70–99)
GLUCOSE BLDC GLUCOMTR-MCNC: 182 MG/DL — HIGH (ref 70–99)
GLUCOSE BLDC GLUCOMTR-MCNC: 184 MG/DL — HIGH (ref 70–99)
GLUCOSE BLDC GLUCOMTR-MCNC: 209 MG/DL — HIGH (ref 70–99)
GLUCOSE BLDV-MCNC: 70 MG/DL — SIGNIFICANT CHANGE UP (ref 70–99)
GLUCOSE SERPL-MCNC: 127 MG/DL — HIGH (ref 70–99)
GLUCOSE SERPL-MCNC: 74 MG/DL — SIGNIFICANT CHANGE UP (ref 70–99)
GLUCOSE UR QL: ABNORMAL
HAPTOGLOB SERPL-MCNC: 60 MG/DL — SIGNIFICANT CHANGE UP (ref 34–200)
HCO3 BLDV-SCNC: 19 MMOL/L — LOW (ref 22–29)
HCT VFR BLD CALC: 22.8 % — LOW (ref 34.5–45)
HCT VFR BLDA CALC: 24 % — LOW (ref 34.5–46.5)
HGB BLD CALC-MCNC: 8.1 G/DL — LOW (ref 11.5–15.5)
HGB BLD-MCNC: 7.7 G/DL — LOW (ref 11.5–15.5)
HYALINE CASTS # UR AUTO: 1 /LPF — SIGNIFICANT CHANGE UP (ref 0–7)
KETONES UR-MCNC: NEGATIVE — SIGNIFICANT CHANGE UP
LACTATE BLDV-MCNC: 0.9 MMOL/L — SIGNIFICANT CHANGE UP (ref 0.5–2)
LDH SERPL L TO P-CCNC: 306 U/L — HIGH (ref 135–225)
LEUKOCYTE ESTERASE UR-ACNC: NEGATIVE — SIGNIFICANT CHANGE UP
MAGNESIUM SERPL-MCNC: 1.7 MG/DL — SIGNIFICANT CHANGE UP (ref 1.6–2.6)
MAGNESIUM SERPL-MCNC: 1.7 MG/DL — SIGNIFICANT CHANGE UP (ref 1.6–2.6)
MCHC RBC-ENTMCNC: 26.4 PG — LOW (ref 27–34)
MCHC RBC-ENTMCNC: 33.8 GM/DL — SIGNIFICANT CHANGE UP (ref 32–36)
MCV RBC AUTO: 78.1 FL — LOW (ref 80–100)
NITRITE UR-MCNC: NEGATIVE — SIGNIFICANT CHANGE UP
NRBC # BLD: 0 /100 WBCS — SIGNIFICANT CHANGE UP (ref 0–0)
NRBC # FLD: 0 K/UL — SIGNIFICANT CHANGE UP (ref 0–0)
OSMOLALITY UR: 194 MOSM/KG — SIGNIFICANT CHANGE UP (ref 50–1200)
PCO2 BLDV: 38 MMHG — LOW (ref 39–42)
PH BLDV: 7.31 — LOW (ref 7.32–7.43)
PH UR: 6 — SIGNIFICANT CHANGE UP (ref 5–8)
PHOSPHATE SERPL-MCNC: 4.5 MG/DL — SIGNIFICANT CHANGE UP (ref 2.5–4.5)
PHOSPHATE SERPL-MCNC: 4.7 MG/DL — HIGH (ref 2.5–4.5)
PLATELET # BLD AUTO: 124 K/UL — LOW (ref 150–400)
PO2 BLDV: 86 MMHG — SIGNIFICANT CHANGE UP
POTASSIUM BLDV-SCNC: 5.2 MMOL/L — HIGH (ref 3.5–5.1)
POTASSIUM SERPL-MCNC: 5.1 MMOL/L — SIGNIFICANT CHANGE UP (ref 3.5–5.3)
POTASSIUM SERPL-MCNC: 5.2 MMOL/L — SIGNIFICANT CHANGE UP (ref 3.5–5.3)
POTASSIUM SERPL-SCNC: 5.1 MMOL/L — SIGNIFICANT CHANGE UP (ref 3.5–5.3)
POTASSIUM SERPL-SCNC: 5.2 MMOL/L — SIGNIFICANT CHANGE UP (ref 3.5–5.3)
POTASSIUM UR-SCNC: 14.2 MMOL/L — SIGNIFICANT CHANGE UP
PROT SERPL-MCNC: 6.3 G/DL — SIGNIFICANT CHANGE UP (ref 6–8.3)
PROT SERPL-MCNC: 6.5 G/DL — SIGNIFICANT CHANGE UP (ref 6–8.3)
PROT UR-MCNC: ABNORMAL
RBC # BLD: 2.92 M/UL — LOW (ref 3.8–5.2)
RBC # FLD: 15 % — HIGH (ref 10.3–14.5)
RBC CASTS # UR COMP ASSIST: 4 /HPF — SIGNIFICANT CHANGE UP (ref 0–4)
RETICS #: 57 K/UL — SIGNIFICANT CHANGE UP (ref 25–125)
RETICS/RBC NFR: 2 % — SIGNIFICANT CHANGE UP (ref 0.5–2.5)
SAO2 % BLDV: 97.6 % — SIGNIFICANT CHANGE UP
SODIUM SERPL-SCNC: 121 MMOL/L — LOW (ref 135–145)
SODIUM SERPL-SCNC: 123 MMOL/L — LOW (ref 135–145)
SODIUM UR-SCNC: 47 MMOL/L — SIGNIFICANT CHANGE UP
SP GR SPEC: 1.01 — SIGNIFICANT CHANGE UP (ref 1.01–1.05)
SPECIMEN SOURCE: SIGNIFICANT CHANGE UP
T3 SERPL-MCNC: 54 NG/DL — LOW (ref 80–200)
T3 SERPL-MCNC: 54 NG/DL — LOW (ref 80–200)
T4 AB SER-ACNC: 6.2 UG/DL — SIGNIFICANT CHANGE UP (ref 5.1–13)
T4 AB SER-ACNC: 6.21 UG/DL — SIGNIFICANT CHANGE UP (ref 5.1–13)
T4/T3 UPTAKE INDEX SERPL: 0.92 TBI — SIGNIFICANT CHANGE UP (ref 0.8–1.3)
TSH SERPL-MCNC: 2.51 UIU/ML — SIGNIFICANT CHANGE UP (ref 0.27–4.2)
UROBILINOGEN FLD QL: SIGNIFICANT CHANGE UP
UUN UR-MCNC: 114 MG/DL — SIGNIFICANT CHANGE UP
WBC # BLD: 7.66 K/UL — SIGNIFICANT CHANGE UP (ref 3.8–10.5)
WBC # FLD AUTO: 7.66 K/UL — SIGNIFICANT CHANGE UP (ref 3.8–10.5)
WBC UR QL: 3 /HPF — SIGNIFICANT CHANGE UP (ref 0–5)

## 2023-01-18 PROCEDURE — 99232 SBSQ HOSP IP/OBS MODERATE 35: CPT | Mod: GC

## 2023-01-18 PROCEDURE — 99222 1ST HOSP IP/OBS MODERATE 55: CPT

## 2023-01-18 PROCEDURE — 99233 SBSQ HOSP IP/OBS HIGH 50: CPT

## 2023-01-18 PROCEDURE — 76770 US EXAM ABDO BACK WALL COMP: CPT | Mod: 26

## 2023-01-18 PROCEDURE — 74176 CT ABD & PELVIS W/O CONTRAST: CPT | Mod: 26

## 2023-01-18 PROCEDURE — 99223 1ST HOSP IP/OBS HIGH 75: CPT | Mod: GC

## 2023-01-18 RX ORDER — LEVOTHYROXINE SODIUM 125 MCG
94 TABLET ORAL AT BEDTIME
Refills: 0 | Status: DISCONTINUED | OUTPATIENT
Start: 2023-01-18 | End: 2023-01-27

## 2023-01-18 RX ORDER — FUROSEMIDE 40 MG
40 TABLET ORAL DAILY
Refills: 0 | Status: DISCONTINUED | OUTPATIENT
Start: 2023-01-18 | End: 2023-01-18

## 2023-01-18 RX ORDER — VANCOMYCIN HCL 1 G
1000 VIAL (EA) INTRAVENOUS ONCE
Refills: 0 | Status: COMPLETED | OUTPATIENT
Start: 2023-01-19 | End: 2023-01-19

## 2023-01-18 RX ORDER — HEPARIN SODIUM 5000 [USP'U]/ML
5000 INJECTION INTRAVENOUS; SUBCUTANEOUS EVERY 8 HOURS
Refills: 0 | Status: DISCONTINUED | OUTPATIENT
Start: 2023-01-18 | End: 2023-01-31

## 2023-01-18 RX ORDER — DEXTROSE 50 % IN WATER 50 %
25 SYRINGE (ML) INTRAVENOUS ONCE
Refills: 0 | Status: COMPLETED | OUTPATIENT
Start: 2023-01-18 | End: 2023-01-18

## 2023-01-18 RX ORDER — IPRATROPIUM/ALBUTEROL SULFATE 18-103MCG
3 AEROSOL WITH ADAPTER (GRAM) INHALATION ONCE
Refills: 0 | Status: COMPLETED | OUTPATIENT
Start: 2023-01-18 | End: 2023-01-18

## 2023-01-18 RX ORDER — INFLUENZA VIRUS VACCINE 15; 15; 15; 15 UG/.5ML; UG/.5ML; UG/.5ML; UG/.5ML
0.5 SUSPENSION INTRAMUSCULAR ONCE
Refills: 0 | Status: DISCONTINUED | OUTPATIENT
Start: 2023-01-18 | End: 2023-01-31

## 2023-01-18 RX ORDER — BUMETANIDE 0.25 MG/ML
1 INJECTION INTRAMUSCULAR; INTRAVENOUS EVERY 12 HOURS
Refills: 0 | Status: DISCONTINUED | OUTPATIENT
Start: 2023-01-18 | End: 2023-01-20

## 2023-01-18 RX ORDER — HYDRALAZINE HCL 50 MG
2.5 TABLET ORAL ONCE
Refills: 0 | Status: COMPLETED | OUTPATIENT
Start: 2023-01-18 | End: 2023-01-18

## 2023-01-18 RX ADMIN — Medication 1: at 18:58

## 2023-01-18 RX ADMIN — Medication 25 GRAM(S): at 05:26

## 2023-01-18 RX ADMIN — Medication 94 MICROGRAM(S): at 23:57

## 2023-01-18 RX ADMIN — Medication 3 MILLILITER(S): at 12:00

## 2023-01-18 RX ADMIN — PIPERACILLIN AND TAZOBACTAM 25 GRAM(S): 4; .5 INJECTION, POWDER, LYOPHILIZED, FOR SOLUTION INTRAVENOUS at 21:15

## 2023-01-18 RX ADMIN — Medication 40 MILLIGRAM(S): at 11:09

## 2023-01-18 RX ADMIN — PIPERACILLIN AND TAZOBACTAM 25 GRAM(S): 4; .5 INJECTION, POWDER, LYOPHILIZED, FOR SOLUTION INTRAVENOUS at 15:35

## 2023-01-18 RX ADMIN — HEPARIN SODIUM 5000 UNIT(S): 5000 INJECTION INTRAVENOUS; SUBCUTANEOUS at 21:16

## 2023-01-18 RX ADMIN — Medication 2.5 MILLIGRAM(S): at 23:49

## 2023-01-18 RX ADMIN — Medication 250 MILLIGRAM(S): at 06:03

## 2023-01-18 RX ADMIN — PIPERACILLIN AND TAZOBACTAM 25 GRAM(S): 4; .5 INJECTION, POWDER, LYOPHILIZED, FOR SOLUTION INTRAVENOUS at 10:47

## 2023-01-18 RX ADMIN — Medication 200 MILLIGRAM(S): at 13:12

## 2023-01-18 RX ADMIN — ATORVASTATIN CALCIUM 40 MILLIGRAM(S): 80 TABLET, FILM COATED ORAL at 21:15

## 2023-01-18 RX ADMIN — BUMETANIDE 1 MILLIGRAM(S): 0.25 INJECTION INTRAMUSCULAR; INTRAVENOUS at 18:58

## 2023-01-18 NOTE — PROGRESS NOTE ADULT - SUBJECTIVE AND OBJECTIVE BOX
Chief Complaint: Hypothyroidism and DM    History: Ate this morning but was made NPO. Did not appear to be receiving LT4 PO. Patient unsure why it wasn't brought to her.    MEDICATIONS  (STANDING):  atorvastatin 40 milliGRAM(s) Oral at bedtime  buMETAnide Injectable 1 milliGRAM(s) IV Push every 12 hours  dextrose 5%. 1000 milliLiter(s) (50 mL/Hr) IV Continuous <Continuous>  dextrose 5%. 1000 milliLiter(s) (100 mL/Hr) IV Continuous <Continuous>  dextrose 50% Injectable 25 Gram(s) IV Push once  dextrose 50% Injectable 12.5 Gram(s) IV Push once  dextrose 50% Injectable 25 Gram(s) IV Push once  glucagon  Injectable 1 milliGRAM(s) IntraMuscular once  heparin   Injectable 5000 Unit(s) SubCutaneous every 8 hours  influenza   Vaccine 0.5 milliLiter(s) IntraMuscular once  insulin lispro (ADMELOG) corrective regimen sliding scale   SubCutaneous three times a day before meals  insulin lispro (ADMELOG) corrective regimen sliding scale   SubCutaneous at bedtime  levothyroxine 125 MICROGram(s) Oral daily  piperacillin/tazobactam IVPB.. 3.375 Gram(s) IV Intermittent every 8 hours  vancomycin  IVPB 1000 milliGRAM(s) IV Intermittent once    MEDICATIONS  (PRN):  acetaminophen     Tablet .. 650 milliGRAM(s) Oral every 6 hours PRN Temp greater or equal to 38C (100.4F), Mild Pain (1 - 3)  aluminum hydroxide/magnesium hydroxide/simethicone Suspension 30 milliLiter(s) Oral every 4 hours PRN Dyspepsia  dextrose Oral Gel 15 Gram(s) Oral once PRN Blood Glucose LESS THAN 70 milliGRAM(s)/deciliter  guaiFENesin Oral Liquid (Sugar-Free) 200 milliGRAM(s) Oral every 6 hours PRN Cough  melatonin 3 milliGRAM(s) Oral at bedtime PRN Insomnia  ondansetron Injectable 4 milliGRAM(s) IV Push every 8 hours PRN Nausea and/or Vomiting      PHYSICAL EXAM:  VITALS: T(C): 36.7 (01-18-23 @ 11:19)  T(F): 98 (01-18-23 @ 11:19), Max: 98 (01-18-23 @ 11:19)  HR: 82 (01-18-23 @ 18:59) (72 - 88)  BP: 165/78 (01-18-23 @ 18:19) (145/78 - 176/81)  RR:  (12 - 24)  SpO2:  (97% - 100%)  Wt(kg): --  General: Well-developed female, in mild-to-moderate distress.   Eye:  Extraocular movements are intact, No proptosis or lid lag, No scleral icterus.   Respiratory:  Respirations were moderately labored, Symmetric chest wall expansion.  Cardiovascular:  Normal rate, Regular rhythm, No edema.  Gastrointestinal:  Soft, Non-tender, Non-distended.   Integumentary:  Warm, dry.    POCT Blood Glucose.: 182 mg/dL (01-18-23 @ 18:47)  POCT Blood Glucose.: 169 mg/dL (01-18-23 @ 17:13)  POCT Blood Glucose.: 184 mg/dL (01-18-23 @ 12:12)  POCT Blood Glucose.: 109 mg/dL (01-18-23 @ 09:09)  POCT Blood Glucose.: 128 mg/dL (01-18-23 @ 06:39)  POCT Blood Glucose.: 71 mg/dL (01-18-23 @ 05:17)  POCT Blood Glucose.: 139 mg/dL (01-17-23 @ 23:55)  POCT Blood Glucose.: 83 mg/dL (01-17-23 @ 22:46)  POCT Blood Glucose.: 67 mg/dL (01-17-23 @ 21:44)  POCT Blood Glucose.: 90 mg/dL (01-17-23 @ 18:11)  POCT Blood Glucose.: 182 mg/dL (01-17-23 @ 13:43)  POCT Blood Glucose.: 209 mg/dL (01-17-23 @ 09:09)  POCT Blood Glucose.: 196 mg/dL (01-16-23 @ 18:27)      01-18    123<L>  |  92<L>  |  24<H>  ----------------------------<  127<H>  5.1   |  18<L>  |  1.56<H>    eGFR: 39<L>    Ca    8.8      01-18  Mg     1.70     01-18  Phos  4.5     01-18    TPro  6.3  /  Alb  3.4  /  TBili  0.6  /  DBili  x   /  AST  33<H>  /  ALT  34<H>  /  AlkPhos  323<H>  01-18          Thyroid Function Tests:  01-18 @ 06:00 TSH -- FreeT4 -- T3 54 Anti TPO -- Anti Thyroglobulin Ab -- TSI --  01-18 @ 03:50 TSH 2.51 FreeT4 -- T3 54 Anti TPO -- Anti Thyroglobulin Ab -- TSI --

## 2023-01-18 NOTE — ED ADULT NURSE REASSESSMENT NOTE - NS ED NURSE REASSESS COMMENT FT1
Patient at baseline mental status. Breathing even and unlabored. Dr. Brewster at bedside states patient can go to US at this time. Denies chest pain, headache and dizziness. Noted to have nonproductive cough at this time. NSR on cardiac monitor. Medications administered as per MD order.

## 2023-01-18 NOTE — PROGRESS NOTE ADULT - PROBLEM SELECTOR PROBLEM 5
Uncontrolled type 2 diabetes mellitus with hyperglycemia History of myxedema coma ISAMAR (acute kidney injury)

## 2023-01-18 NOTE — CONSULT NOTE ADULT - SUBJECTIVE AND OBJECTIVE BOX
NEPHROLOGY CONSULTATION NOTE    56 y/o female with pmh of Hypothyroidism, HTN, T2DM, CKD, PAH, asthma, and HLD who presented to the ED for respiratory distress. The pt was recently at [facility] for myxedema coma, delirium, and multiorgan failure in which she was intubated at the time and received vasopressors and broad spectrum antibiotics. On  due to increased work of breathing the pt came to the ED. In the ED, CT chest with moderate pleural effusions, elevated proBNP, and given lasix 40 mg IVPx1.      PAST MEDICAL & SURGICAL HISTORY:  HTN (hypertension)      HLD (hyperlipidemia)      DM (diabetes mellitus)      Hypothyroid      H/O pulmonary hypertension      CKD (chronic kidney disease)      No significant past surgical history        Allergies:  No Known Allergies    Home Medications Reviewed  Hospital Medications:   MEDICATIONS  (STANDING):  atorvastatin 40 milliGRAM(s) Oral at bedtime  dextrose 5%. 1000 milliLiter(s) (50 mL/Hr) IV Continuous <Continuous>  dextrose 5%. 1000 milliLiter(s) (100 mL/Hr) IV Continuous <Continuous>  dextrose 50% Injectable 25 Gram(s) IV Push once  dextrose 50% Injectable 12.5 Gram(s) IV Push once  dextrose 50% Injectable 25 Gram(s) IV Push once  furosemide   Injectable 40 milliGRAM(s) IV Push daily  glucagon  Injectable 1 milliGRAM(s) IntraMuscular once  influenza   Vaccine 0.5 milliLiter(s) IntraMuscular once  insulin lispro (ADMELOG) corrective regimen sliding scale   SubCutaneous three times a day before meals  insulin lispro (ADMELOG) corrective regimen sliding scale   SubCutaneous at bedtime  levothyroxine 125 MICROGram(s) Oral daily  piperacillin/tazobactam IVPB.. 3.375 Gram(s) IV Intermittent every 8 hours    SOCIAL HISTORY:  Denies ETOH,Smoking,   FAMILY HISTORY:      REVIEW OF SYSTEMS:  CONSTITUTIONAL: No weakness, fevers or chills  EYES/ENT: No visual changes;  No vertigo or throat pain   NECK: No pain or stiffness  RESPIRATORY: No cough, wheezing, hemoptysis; No shortness of breath  CARDIOVASCULAR: No chest pain or palpitations.  GASTROINTESTINAL: No abdominal or epigastric pain. No nausea, vomiting, or hematemesis; No diarrhea or constipation. No melena or hematochezia.  GENITOURINARY: No dysuria, frequency, foamy urine, urinary urgency, incontinence or hematuria  NEUROLOGICAL: No numbness or weakness  SKIN: No itching, burning, rashes, or lesions   VASCULAR: No bilateral lower extremity edema.   All other review of systems is negative unless indicated above.    VITALS:  Vital Signs Last 24 Hrs  T(C): 36.7 (2023 11:19), Max: 36.7 (2023 11:19)  T(F): 98 (2023 11:19), Max: 98 (2023 11:19)  HR: 87 (2023 11:19) (69 - 87)  BP: 145/78 (2023 11:19) (144/86 - 162/72)  BP(mean): --  RR: 17 (2023 11:19) (12 - 24)  SpO2: 100% (2023 11:19) (98% - 100%)    Parameters below as of 2023 11:19  Patient On (Oxygen Delivery Method): room air          PHYSICAL EXAM:  Constitutional: NAD  HEENT: anicteric sclera, oropharynx clear, MMM  Respiratory: CTAB, expiratory wheezing, no rales or rhonchi  Cardiovascular: S1, S2, RRR  Gastrointestinal: BS+, soft, distended  Extremities: No cyanosis or clubbing. No peripheral edema  Neurological: A/O x 3, no focal deficits  Psychiatric: Normal mood, normal affect  : No CVA tenderness. No faye.   Skin: No rashes    LABS:      123<L>  |  92<L>  |  24<H>  ----------------------------<  127<H>  5.1   |  18<L>  |  1.56<H>    Ca    8.8      2023 06:00  Phos  4.5       Mg     1.70         TPro  6.3  /  Alb  3.4  /  TBili  0.6  /  DBili      /  AST  33<H>  /  ALT  34<H>  /  AlkPhos  323<H>      Creatinine Trend: 1.56 <--, 1.52 <--, 1.35 <--, 1.17 <--, 1.05 <--                        7.7    7.66  )-----------( 124      ( 2023 03:50 )             22.8     Urine Studies:  Urinalysis Basic - ( 2023 05:58 )    Color: Colorless / Appearance: Clear / S.011 / pH:   Gluc:  / Ketone: Negative  / Bili: Negative / Urobili: <2 mg/dL   Blood:  / Protein: 30 mg/dL / Nitrite: Negative   Leuk Esterase: Negative / RBC: 4 /HPF / WBC 3 /HPF   Sq Epi:  / Non Sq Epi: 3 /HPF / Bacteria: Negative      Sodium, Random Urine: 47 mmol/L ( @ 05:58)  Osmolality, Random Urine: 194 mosm/kg ( @ 05:58)  Potassium, Random Urine: 14.2 mmol/L ( 05:58)  Sodium, Random Urine: 42 mmol/L ( @ 09:00)  Potassium, Random Urine: 19.4 mmol/L ( @ 09:00)  Chloride, Random Urine: 45 mmol/L ( @ 09:00)  Creatinine, Random Urine: 41 mg/dL ( @ 09:00)  Protein/Creatinine Ratio Calculation: 5.9 Ratio ( @ 09:00)  Osmolality, Random Urine: 287 mosm/kg ( @ 09:00)      CTA Chest (2023): No P.E. B/l upper lobe predominant interlobular septal thickening, patchy bilateral ground glass opacities, and moderate right and small left pleural effusions. Findings most likely represent pulmonary edema. Cardiomegaly. Small pericardial effusion.    TTE (2022):     1. Endocardium not well visualized; grossly normal left  ventricular systolic function. Endocardial visualization  enhanced with intravenous injection of echo contrast  (Definity).  2. Normal right ventricular size and function.  3. Normal tricuspid valve. Mild-moderate tricuspid  regurgitation.  4. Estimated pulmonary artery systolic pressure equals 53  mm Hg, assuming right atrial pressure equals 10  mm Hg,  consistent with moderate pulmonary hypertension.  *** Compared with echocardiogram of 2022, no  significant changes noted.         NEPHROLOGY CONSULTATION NOTE    58 y/o female with pmh of Hypothyroidism, HTN, T2DM, CKD, PAH, asthma, and HLD who presented to the ED for respiratory distress. The pt was recently at [facility] for myxedema coma, delirium, and multiorgan failure in which she was intubated at the time and received vasopressors and broad spectrum antibiotics. On  due to increased work of breathing the pt came to the ED. In the ED, CT chest with moderate pleural effusions, elevated proBNP, and given lasix 40 mg IVPx1.       PAST MEDICAL & SURGICAL HISTORY:  HTN (hypertension)      HLD (hyperlipidemia)      DM (diabetes mellitus)      Hypothyroid      H/O pulmonary hypertension      CKD (chronic kidney disease)      No significant past surgical history        Allergies:  No Known Allergies    Home Medications Reviewed  Hospital Medications:   MEDICATIONS  (STANDING):  atorvastatin 40 milliGRAM(s) Oral at bedtime  dextrose 5%. 1000 milliLiter(s) (50 mL/Hr) IV Continuous <Continuous>  dextrose 5%. 1000 milliLiter(s) (100 mL/Hr) IV Continuous <Continuous>  dextrose 50% Injectable 25 Gram(s) IV Push once  dextrose 50% Injectable 12.5 Gram(s) IV Push once  dextrose 50% Injectable 25 Gram(s) IV Push once  furosemide   Injectable 40 milliGRAM(s) IV Push daily  glucagon  Injectable 1 milliGRAM(s) IntraMuscular once  influenza   Vaccine 0.5 milliLiter(s) IntraMuscular once  insulin lispro (ADMELOG) corrective regimen sliding scale   SubCutaneous three times a day before meals  insulin lispro (ADMELOG) corrective regimen sliding scale   SubCutaneous at bedtime  levothyroxine 125 MICROGram(s) Oral daily  piperacillin/tazobactam IVPB.. 3.375 Gram(s) IV Intermittent every 8 hours    SOCIAL HISTORY:  Denies ETOH,Smoking,   FAMILY HISTORY:      REVIEW OF SYSTEMS:  CONSTITUTIONAL: No weakness, fevers or chills  EYES/ENT: No visual changes;  No vertigo or throat pain   NECK: No pain or stiffness  RESPIRATORY: No cough, wheezing, hemoptysis; No shortness of breath  CARDIOVASCULAR: No chest pain or palpitations.  GASTROINTESTINAL: No abdominal or epigastric pain. No nausea, vomiting, or hematemesis; No diarrhea or constipation. No melena or hematochezia.  GENITOURINARY: No dysuria, frequency, foamy urine, urinary urgency, incontinence or hematuria  NEUROLOGICAL: No numbness or weakness  SKIN: No itching, burning, rashes, or lesions   VASCULAR: No bilateral lower extremity edema.   All other review of systems is negative unless indicated above.    VITALS:  Vital Signs Last 24 Hrs  T(C): 36.7 (2023 11:19), Max: 36.7 (2023 11:19)  T(F): 98 (2023 11:19), Max: 98 (2023 11:19)  HR: 87 (2023 11:19) (69 - 87)  BP: 145/78 (2023 11:19) (144/86 - 162/72)  BP(mean): --  RR: 17 (2023 11:19) (12 - 24)  SpO2: 100% (2023 11:19) (98% - 100%)    Parameters below as of 2023 11:19  Patient On (Oxygen Delivery Method): room air          PHYSICAL EXAM:  Constitutional: NAD  HEENT: anicteric sclera, oropharynx clear, MMM  Respiratory: CTAB, expiratory wheezing, no rales or rhonchi  Cardiovascular: S1, S2, RRR  Gastrointestinal: BS+, soft, distended  Extremities: No cyanosis or clubbing. No peripheral edema  Neurological: A/O x 3, no focal deficits  Psychiatric: Normal mood, normal affect  : No CVA tenderness. No faye.   Skin: No rashes    LABS:      123<L>  |  92<L>  |  24<H>  ----------------------------<  127<H>  5.1   |  18<L>  |  1.56<H>    Ca    8.8      2023 06:00  Phos  4.5       Mg     1.70         TPro  6.3  /  Alb  3.4  /  TBili  0.6  /  DBili      /  AST  33<H>  /  ALT  34<H>  /  AlkPhos  323<H>      Creatinine Trend: 1.56 <--, 1.52 <--, 1.35 <--, 1.17 <--, 1.05 <--                        7.7    7.66  )-----------( 124      ( 2023 03:50 )             22.8     Urine Studies:  Urinalysis Basic - ( 2023 05:58 )    Color: Colorless / Appearance: Clear / S.011 / pH:   Gluc:  / Ketone: Negative  / Bili: Negative / Urobili: <2 mg/dL   Blood:  / Protein: 30 mg/dL / Nitrite: Negative   Leuk Esterase: Negative / RBC: 4 /HPF / WBC 3 /HPF   Sq Epi:  / Non Sq Epi: 3 /HPF / Bacteria: Negative      Sodium, Random Urine: 47 mmol/L ( @ 05:58)  Osmolality, Random Urine: 194 mosm/kg ( @ 05:58)  Potassium, Random Urine: 14.2 mmol/L ( 05:58)  Sodium, Random Urine: 42 mmol/L ( @ 09:00)  Potassium, Random Urine: 19.4 mmol/L ( @ 09:00)  Chloride, Random Urine: 45 mmol/L ( @ 09:00)  Creatinine, Random Urine: 41 mg/dL ( @ 09:00)  Protein/Creatinine Ratio Calculation: 5.9 Ratio ( @ 09:00)  Osmolality, Random Urine: 287 mosm/kg ( @ 09:00)      CTA Chest (2023): No P.E. B/l upper lobe predominant interlobular septal thickening, patchy bilateral ground glass opacities, and moderate right and small left pleural effusions. Findings most likely represent pulmonary edema. Cardiomegaly. Small pericardial effusion.    TTE (2022):     1. Endocardium not well visualized; grossly normal left  ventricular systolic function. Endocardial visualization  enhanced with intravenous injection of echo contrast  (Definity).  2. Normal right ventricular size and function.  3. Normal tricuspid valve. Mild-moderate tricuspid  regurgitation.  4. Estimated pulmonary artery systolic pressure equals 53  mm Hg, assuming right atrial pressure equals 10  mm Hg,  consistent with moderate pulmonary hypertension.  *** Compared with echocardiogram of 2022, no  significant changes noted.         NEPHROLOGY CONSULTATION NOTE    58 y/o female with pmh of Hypothyroidism, HTN, T2DM, CKD, PAH, asthma, and HLD who presented to the ED for respiratory distress. The pt was recently at [facility] for myxedema coma, delirium, and multiorgan failure in which she was intubated at the time and received vasopressors and broad spectrum antibiotics. On  due to increased work of breathing the pt came to the ED. In the ED, CT chest with moderate pleural effusions, elevated proBNP, and given lasix 40 mg IVPx1. Nephrology consulted for hyponatremia and ISAMAR in setting of volume overloaded.       PAST MEDICAL & SURGICAL HISTORY:  HTN (hypertension)      HLD (hyperlipidemia)      DM (diabetes mellitus)      Hypothyroid      H/O pulmonary hypertension      CKD (chronic kidney disease)      No significant past surgical history        Allergies:  No Known Allergies    Home Medications Reviewed  Hospital Medications:   MEDICATIONS  (STANDING):  atorvastatin 40 milliGRAM(s) Oral at bedtime  dextrose 5%. 1000 milliLiter(s) (50 mL/Hr) IV Continuous <Continuous>  dextrose 5%. 1000 milliLiter(s) (100 mL/Hr) IV Continuous <Continuous>  dextrose 50% Injectable 25 Gram(s) IV Push once  dextrose 50% Injectable 12.5 Gram(s) IV Push once  dextrose 50% Injectable 25 Gram(s) IV Push once  furosemide   Injectable 40 milliGRAM(s) IV Push daily  glucagon  Injectable 1 milliGRAM(s) IntraMuscular once  influenza   Vaccine 0.5 milliLiter(s) IntraMuscular once  insulin lispro (ADMELOG) corrective regimen sliding scale   SubCutaneous three times a day before meals  insulin lispro (ADMELOG) corrective regimen sliding scale   SubCutaneous at bedtime  levothyroxine 125 MICROGram(s) Oral daily  piperacillin/tazobactam IVPB.. 3.375 Gram(s) IV Intermittent every 8 hours    SOCIAL HISTORY:  Denies ETOH,Smoking,   FAMILY HISTORY:      REVIEW OF SYSTEMS:  CONSTITUTIONAL: No weakness, fevers or chills  EYES/ENT: No visual changes;  No vertigo or throat pain   NECK: No pain or stiffness  RESPIRATORY: No cough, wheezing, hemoptysis; No shortness of breath  CARDIOVASCULAR: No chest pain or palpitations.  GASTROINTESTINAL: No abdominal or epigastric pain. No nausea, vomiting, or hematemesis; No diarrhea or constipation. No melena or hematochezia.  GENITOURINARY: No dysuria, frequency, foamy urine, urinary urgency, incontinence or hematuria  NEUROLOGICAL: No numbness or weakness  SKIN: No itching, burning, rashes, or lesions   VASCULAR: No bilateral lower extremity edema.   All other review of systems is negative unless indicated above.    VITALS:  Vital Signs Last 24 Hrs  T(C): 36.7 (2023 11:19), Max: 36.7 (2023 11:19)  T(F): 98 (2023 11:19), Max: 98 (2023 11:19)  HR: 87 (2023 11:19) (69 - 87)  BP: 145/78 (2023 11:19) (144/86 - 162/72)  BP(mean): --  RR: 17 (2023 11:19) (12 - 24)  SpO2: 100% (2023 11:19) (98% - 100%)    Parameters below as of 2023 11:19  Patient On (Oxygen Delivery Method): room air          PHYSICAL EXAM:  Constitutional: NAD  HEENT: anicteric sclera, oropharynx clear, MMM  Respiratory: CTAB, expiratory wheezing, no rales or rhonchi  Cardiovascular: S1, S2, RRR  Gastrointestinal: BS+, soft, distended  Extremities: No cyanosis or clubbing. No peripheral edema  Neurological: A/O x 3, no focal deficits  Psychiatric: Normal mood, normal affect  : No CVA tenderness. No faye.   Skin: No rashes    LABS:      123<L>  |  92<L>  |  24<H>  ----------------------------<  127<H>  5.1   |  18<L>  |  1.56<H>    Ca    8.8      2023 06:00  Phos  4.5       Mg     1.70         TPro  6.3  /  Alb  3.4  /  TBili  0.6  /  DBili      /  AST  33<H>  /  ALT  34<H>  /  AlkPhos  323<H>      Creatinine Trend: 1.56 <--, 1.52 <--, 1.35 <--, 1.17 <--, 1.05 <--                        7.7    7.66  )-----------( 124      ( 2023 03:50 )             22.8     Urine Studies:  Urinalysis Basic - ( 2023 05:58 )    Color: Colorless / Appearance: Clear / S.011 / pH:   Gluc:  / Ketone: Negative  / Bili: Negative / Urobili: <2 mg/dL   Blood:  / Protein: 30 mg/dL / Nitrite: Negative   Leuk Esterase: Negative / RBC: 4 /HPF / WBC 3 /HPF   Sq Epi:  / Non Sq Epi: 3 /HPF / Bacteria: Negative      Sodium, Random Urine: 47 mmol/L ( @ 05:58)  Osmolality, Random Urine: 194 mosm/kg ( 05:58)  Potassium, Random Urine: 14.2 mmol/L ( 05:58)  Sodium, Random Urine: 42 mmol/L ( @ 09:00)  Potassium, Random Urine: 19.4 mmol/L ( @ 09:00)  Chloride, Random Urine: 45 mmol/L ( @ 09:00)  Creatinine, Random Urine: 41 mg/dL ( @ 09:00)  Protein/Creatinine Ratio Calculation: 5.9 Ratio ( @ 09:00)  Osmolality, Random Urine: 287 mosm/kg ( @ 09:00)      CTA Chest (2023): No P.E. B/l upper lobe predominant interlobular septal thickening, patchy bilateral ground glass opacities, and moderate right and small left pleural effusions. Findings most likely represent pulmonary edema. Cardiomegaly. Small pericardial effusion.    TTE (2022):     1. Endocardium not well visualized; grossly normal left  ventricular systolic function. Endocardial visualization  enhanced with intravenous injection of echo contrast  (Definity).  2. Normal right ventricular size and function.  3. Normal tricuspid valve. Mild-moderate tricuspid  regurgitation.  4. Estimated pulmonary artery systolic pressure equals 53  mm Hg, assuming right atrial pressure equals 10  mm Hg,  consistent with moderate pulmonary hypertension.  *** Compared with echocardiogram of 2022, no  significant changes noted.

## 2023-01-18 NOTE — PROVIDER CONTACT NOTE (HYPOGLYCEMIA EVENT) - NS PROVIDER CONTACT BACKGROUND-HYPO
Age: 57y    Gender: Female    POCT Blood Glucose:  71 mg/dL (01-18-23 @ 05:17)  139 mg/dL (01-17-23 @ 23:55)  83 mg/dL (01-17-23 @ 22:46)  67 mg/dL (01-17-23 @ 21:44)  90 mg/dL (01-17-23 @ 18:11)  182 mg/dL (01-17-23 @ 13:43)  209 mg/dL (01-17-23 @ 09:09)      eMAR:atorvastatin   40 milliGRAM(s) Oral (01-17-23 @ 22:22)    dextrose 50% Injectable   25 Gram(s) IV Push (01-18-23 @ 05:26)    insulin lispro (ADMELOG) corrective regimen sliding scale   1 Unit(s) SubCutaneous (01-17-23 @ 13:46)

## 2023-01-18 NOTE — PROGRESS NOTE ADULT - PROBLEM SELECTOR PLAN 6
Patient with history of HTN, on multiple antihypertensives at home  - restart hydralazine 50mg TID today (gradually uptitrate to home dose 100mg TID)  - restart home labetalol 50mg BID A1c = 6.9  - C/w ISS and adjust regimen as needed   - Endo following

## 2023-01-18 NOTE — PROGRESS NOTE ADULT - PROBLEM SELECTOR PLAN 4
Noted with Hgb in 7-8 range  - appears to be at baseline  - not active bleeding noted  - likely in setting CKD  - cont to monitor for now Patient with two recent hospitalizations, one at [facility] and one at [facility] for myxedema coma   - Endo following, currently no evidence of myxedema   - TSH wnl   - C/w synthroid

## 2023-01-18 NOTE — PROGRESS NOTE ADULT - PROBLEM SELECTOR PLAN 3
Noted with elevated Cr to 2s this admission  - per chart review previous baseline appears to be 1.4-1.7 range  - noted to be 1.99 today, approaching baseline  - patient noted regular urination  - tolerating PO intake  - will cont to trend, closely monitor electrolytes Na 120->123   - Suspect multifactorial in setting of volume overload, SIADH  - Minimal improvement with lasix  - Discussed w/ renal team, start on Bumex 1mg IV BID   - Is/Os  - Monitor BMP

## 2023-01-18 NOTE — PROGRESS NOTE ADULT - PROBLEM SELECTOR PLAN 5
- A1C noted to 9.7%  - endocrinology following, recs appreciated  - Lantus 16u QHS  - Admelog 9U TIDAC and ADRIAN  - CC diet Cr: 1.05, uptrended to 1.56  - Renal following, ?cardiorenal syndrome   - C/w bumex as above  - Monitor BMP  - Avoid nephrotoxins   - Is/Os

## 2023-01-18 NOTE — PATIENT PROFILE ADULT - FUNCTIONAL ASSESSMENT - DAILY ACTIVITY 4.
Rm 9  Recent Travel Screening and Travel History documentation     Travel Screening     Question   Response    In the last month, have you been in contact with someone who was confirmed or suspected to have Coronavirus / COVID-19? No / Unsure    Have you had a COVID-19 viral test in the last 14 days? No    Do you have any of the following new or worsening symptoms? None of these    Have you traveled internationally or domestically in the last month? No      Travel History   Travel since 04/25/21     No documented travel since 04/25/21          Chief Complaint   Patient presents with    Behavioral Problem     ADHD   pt has had both covid vaccines and will update with exact dates      1. Have you been to the ER, urgent care clinic since your last visit? Hospitalized since your last visit? No    2. Have you seen or consulted any other health care providers outside of the [address] [place] [address] since your last visit? Include any pap smears or colon screening.  No        Health Maintenance Due   Topic Date Due    Hepatitis C Screening  Never done    DTaP/Tdap/Td series (2 - Tdap) 09/16/2009    COVID-19 Vaccine (1) Never done    HPV Age 9Y-34Y (2 - 3-dose series) 03/08/2016           3 most recent PHQ Screens 5/25/2021   Little interest or pleasure in doing things Not at all   Feeling down, depressed, irritable, or hopeless Not at all   Total Score PHQ 2 0   Trouble falling or staying asleep, or sleeping too much -   Feeling tired or having little energy -   Poor appetite, weight loss, or overeating -   Feeling bad about yourself - or that you are a failure or have let yourself or your family down -   Trouble concentrating on things such as school, work, reading, or watching TV -   Moving or speaking so slowly that other people could have noticed; or the opposite being so fidgety that others notice -   Thoughts of being better off dead, or hurting yourself in some way -   PHQ 9 Score -   How difficult have these problems made it for you to do your work, take care of your home and get along with others -           Learning Assessment 8/12/2019   PRIMARY LEARNER Patient   PRIMARY LANGUAGE ENGLISH   LEARNER PREFERENCE PRIMARY VIDEOS   ANSWERED BY patient   RELATIONSHIP SELF         An After Visit Summary was printed and given to the patient. 4 = No assist / stand by assistance

## 2023-01-18 NOTE — CONSULT NOTE ADULT - PROBLEM SELECTOR RECOMMENDATION 9
Na: 120 increased to Na: 123   S/p lasix 40 mg IVPx1 with mild improvement  Multifactorial as pt presents with signs of hypervolemia (CT Chest demonstrates moderate right and small left pleural effusions, pulmonary edema, cardiology, and small pericardial effusion), b/l lower extremity edema and SOB on BIPAP but also noted to have anemia required 1U of pRBCs  TTE 11/2022 demonstrating normal preserved EF  TSH: 2.5, T4: 6.2  Uosm: 287 to 194, Alfie: 47 Na: 120 increased to Na: 123   S/p lasix 40 mg IVPx1 with mild improvement  Multifactorial as pt presents with signs of hypervolemia (CT Chest demonstrates moderate right and small left pleural effusions, pulmonary edema, cardiology, and small pericardial effusion), b/l lower extremity edema and SOB on BIPAP, and elevated proBNP, but also noted to have anemia required 1U of pRBCs  TTE 11/2022 demonstrating normal preserved EF  TSH: 2.5, T4: 6.2, glucose normal  Uosm: 287 to 194, Alfie: 47 Na: 120 increased to Na: 123   S/p lasix 40 mg IVPx1 with mild improvement  Multifactorial as pt presents with signs of hypervolemia (CT Chest demonstrates moderate right and small left pleural effusions, pulmonary edema, cardiology, and small pericardial effusion), b/l lower extremity edema and SOB on BIPAP, and elevated proBNP, but also noted to have anemia required 1U of pRBCs  TTE 11/2022 demonstrating normal preserved EF  TSH: 2.5, T4: 6.2, glucose normal  Uosm: 287 to 194, Alfie: 47  Would recommend Bumex 1 mg BID, please replete electrolytes as necessary  Would recommend BIPAP

## 2023-01-18 NOTE — CONSULT NOTE ADULT - SUBJECTIVE AND OBJECTIVE BOX
CHIEF COMPLAINT:    HPI per H and P: HPI:  58 yo lady w/ hypothyroidism, HTN, T2DM, HLD, CKD, PAH, asthma on albuterol presented to the ED w/respiratory distress.  As per family, the patient was recently admitted at [facility] for myxedema coma, delirium, multiorgan failure patient was intubated at that time, received vasopressors, and broad-spectrum antibiotics as per dc paperwork. After seeing her primary care Dr. Fernandez yesterday she began to experience shortness of breath, which was not alleviated by her albuterol. Denies headache, focal weakness, chest pain, nausea, vomiting, diarrhea, abdominal pain, urinary symptoms, stool symptoms, skin changes.  Denies blood thinner use. As per daughter, there has been decreased PO intake since her last admission. Most information was obtained from the patient's daughter Brittany .     PAST MEDICAL & SURGICAL HISTORY:  HTN (hypertension)      HLD (hyperlipidemia)      DM (diabetes mellitus)      Hypothyroid      H/O pulmonary hypertension      CKD (chronic kidney disease)      No significant past surgical history          Review of Systems:   CONSTITUTIONAL: No fever, weight loss  EYES: No eye pain, visual disturbances, or discharge  ENMT:  No difficulty hearing, tinnitus, vertigo; No sinus or throat pain  NECK: No pain or stiffness  RESPIRATORY: Respiratory distress  CARDIOVASCULAR: No chest pain, palpitations, dizziness, or leg swelling  GASTROINTESTINAL: No abdominal or epigastric pain. No nausea, vomiting, or hematemesis; No diarrhea or constipation. No melena or hematochezia.  GENITOURINARY: No dysuria, frequency, hematuria, or incontinence  NEUROLOGICAL: No headaches, memory loss, loss of strength, numbness, or tremors  SKIN: No itching, burning, rashes, or lesions   LYMPH NODES: No enlarged glands  ENDOCRINE: No heat or cold intolerance; No hair loss  MUSCULOSKELETAL: No joint pain or swelling; No muscle, back, or extremity pain  PSYCHIATRIC: No depression, anxiety, mood swings, or difficulty sleeping  HEME/LYMPH: No easy bruising, or bleeding gums  ALLERGY AND IMMUNOLOGIC: No hives or eczema    Allergies    No Known Allergies    Intolerances        Social History: No recent alcohol/tobacco or drug use    FAMILY HISTORY: No pertinent family history       MEDICATIONS  (STANDING):  atorvastatin 40 milliGRAM(s) Oral at bedtime  dextrose 5%. 1000 milliLiter(s) (50 mL/Hr) IV Continuous <Continuous>  dextrose 5%. 1000 milliLiter(s) (100 mL/Hr) IV Continuous <Continuous>  dextrose 50% Injectable 25 Gram(s) IV Push once  dextrose 50% Injectable 12.5 Gram(s) IV Push once  dextrose 50% Injectable 25 Gram(s) IV Push once  glucagon  Injectable 1 milliGRAM(s) IntraMuscular once  insulin lispro (ADMELOG) corrective regimen sliding scale   SubCutaneous every 6 hours  levothyroxine 125 MICROGram(s) Oral daily    MEDICATIONS  (PRN):  dextrose Oral Gel 15 Gram(s) Oral once PRN Blood Glucose LESS THAN 70 milliGRAM(s)/deciliter      T(C): 36.6 (23 @ 03:41), Max: 37.1 (23 @ 18:21)  HR: 71 (23 @ 06:23) (71 - 86)  BP: 157/87 (23 @ 06:23) (118/81 - 174/86)  RR: 20 (23 @ 06:23) (17 - 20)  SpO2: 100% (23 @ 06:23) (100% - 100%)  Height (cm): 152.4 (23 @ 18:21)  CAPILLARY BLOOD GLUCOSE      POCT Blood Glucose.: 196 mg/dL (2023 18:27)    I&O's Summary      PHYSICAL EXAM:  GENERAL: NAD, well-developed, on nasal canula  HEAD:  Atraumatic, Normocephalic  EYES: conjunctiva and sclera clear  NECK: Supple, No elevated JVD  CHEST/LUNG: Clear to auscultation bilaterally; No wheeze  HEART: Regular rate and rhythm; No murmurs, rubs, or gallops  ABDOMEN: Soft, Nontender, Nondistended; Bowel sounds present  EXTREMITIES:  2+ Peripheral Pulses, No clubbing, cyanosis, or edema  PSYCH: AAOx3, though somewhat slow to respond,   NEUROLOGY: CN II-XII grossly intact, moving all extremities  SKIN: No rashes or lesions    LABS:                        6.5    6.52  )-----------( x        ( 2023 07:10 )             19.5     -    120<LL>  |  89<L>  |  23  ----------------------------<  221<H>  4.8   |  17<L>  |  1.17    Ca    9.0      2023 07:10  Phos  4.5       Mg     1.80         TPro  6.4  /  Alb  3.4  /  TBili  0.3  /  DBili  x   /  AST  30  /  ALT  31  /  AlkPhos  276<H>      PT/INR - ( 2023 20:15 )   PT: 11.9 sec;   INR: 1.03 ratio         PTT - ( 2023 20:15 )  PTT:37.3 sec            RADIOLOGY & ADDITIONAL TESTS:    ECG Personally Reviewed -     Imaging Personally Reviewed:    Consultant(s) Notes Reviewed:      Care Discussed with Consultants/Other Providers:   Discussed with MICU team and endocrine teams  Discharge paper work from [facility] reviewed (2023 08:51)      PAST MEDICAL & SURGICAL HISTORY:  HTN (hypertension)      HLD (hyperlipidemia)      DM (diabetes mellitus)      Hypothyroid      H/O pulmonary hypertension      CKD (chronic kidney disease)      No significant past surgical history          FAMILY HISTORY:      SOCIAL HISTORY:  Smoking: [ ] Never Smoked [ ] Former Smoker (__ packs x ___ years) [ ] Current Smoker  (__ packs x ___ years)  Substance Use: [ ] Never Used [ ] Used ____  EtOH Use:  Occupation:  Recent Travel:  Country of Birth:      Allergies    No Known Allergies    Intolerances        HOME MEDICATIONS:     ROS  Constitutional: denies fevers, chills, night sweats, weight loss  HEENT: denies visual changes, cough  Cardiovascular: denies palpitations, chest pain, edema  Respiratory: denies SOB, wheezing  Gastrointestinal: denies N/V/D, abdominal pain, hematochezia, melena  : denies dysuria, urinary urgency, increased frequency  MSK: denies muscle weakness, joint pain  Skin: denies new rashes or masses  Heme: denies bleeding, bruising  Neuro: denies headache, weakness    PHYSICAL EXAM:   GEN: Age appropriate, resting comfortably in bed, no acute distress, non toxic appearing, speaking in complete sentences.   HEENT: Conjunctiva and sclera normal  PULM: Lungs CTAB, no wheezes, rales, rhonchi  CV: RRR, S1S2, no MRG  MSK: no stiffness or joint effusions  Abdominal: Soft, nontender to palpation, non-distended, +BS  Extremities: No edema or cyanosis  NEURO: AAOx3  Psych: normal affect, normal behavior  Skin: No rashes, lesions      OBJECTIVE:  ICU Vital Signs Last 24 Hrs  T(C): 36.7 (2023 11:19), Max: 36.7 (2023 11:19)  T(F): 98 (2023 11:19), Max: 98 (2023 11:19)  HR: 81 (2023 14:57) (76 - 88)  BP: 176/81 (2023 14:57) (145/78 - 176/81)  BP(mean): --  ABP: --  ABP(mean): --  RR: 20 (2023 14:57) (12 - 24)  SpO2: 98% (2023 14:57) (98% - 100%)    O2 Parameters below as of 2023 14:57  Patient On (Oxygen Delivery Method): BiPAP/CPAP              CAPILLARY BLOOD GLUCOSE      POCT Blood Glucose.: 169 mg/dL (2023 17:13)        HOSPITAL MEDICATIONS:  heparin   Injectable 5000 Unit(s) SubCutaneous every 8 hours    piperacillin/tazobactam IVPB.. 3.375 Gram(s) IV Intermittent every 8 hours  vancomycin  IVPB 1000 milliGRAM(s) IV Intermittent once    buMETAnide Injectable 1 milliGRAM(s) IV Push every 12 hours    atorvastatin 40 milliGRAM(s) Oral at bedtime  dextrose 50% Injectable 25 Gram(s) IV Push once  dextrose 50% Injectable 12.5 Gram(s) IV Push once  dextrose 50% Injectable 25 Gram(s) IV Push once  dextrose Oral Gel 15 Gram(s) Oral once PRN  glucagon  Injectable 1 milliGRAM(s) IntraMuscular once  insulin lispro (ADMELOG) corrective regimen sliding scale   SubCutaneous three times a day before meals  insulin lispro (ADMELOG) corrective regimen sliding scale   SubCutaneous at bedtime  levothyroxine 125 MICROGram(s) Oral daily    guaiFENesin Oral Liquid (Sugar-Free) 200 milliGRAM(s) Oral every 6 hours PRN    acetaminophen     Tablet .. 650 milliGRAM(s) Oral every 6 hours PRN  melatonin 3 milliGRAM(s) Oral at bedtime PRN  ondansetron Injectable 4 milliGRAM(s) IV Push every 8 hours PRN    aluminum hydroxide/magnesium hydroxide/simethicone Suspension 30 milliLiter(s) Oral every 4 hours PRN        dextrose 5%. 1000 milliLiter(s) IV Continuous <Continuous>  dextrose 5%. 1000 milliLiter(s) IV Continuous <Continuous>    influenza   Vaccine 0.5 milliLiter(s) IntraMuscular once          LABS:                        7.7    7.66  )-----------( 124      ( 2023 03:50 )             22.8     Hgb Trend: 7.7<--, 7.9<--, 6.5<--, 7.1<--      123<L>  |  92<L>  |  24<H>  ----------------------------<  127<H>  5.1   |  18<L>  |  1.56<H>    Ca    8.8      2023 06:00  Phos  4.5       Mg     1.70         TPro  6.3  /  Alb  3.4  /  TBili  0.6  /  DBili  x   /  AST  33<H>  /  ALT  34<H>  /  AlkPhos  323<H>      Creatinine Trend: 1.56<--, 1.52<--, 1.35<--, 1.17<--, 1.05<--  PT/INR - ( 2023 20:15 )   PT: 11.9 sec;   INR: 1.03 ratio         PTT - ( 2023 20:15 )  PTT:37.3 sec  Urinalysis Basic - ( 2023 05:58 )    Color: Colorless / Appearance: Clear / S.011 / pH: x  Gluc: x / Ketone: Negative  / Bili: Negative / Urobili: <2 mg/dL   Blood: x / Protein: 30 mg/dL / Nitrite: Negative   Leuk Esterase: Negative / RBC: 4 /HPF / WBC 3 /HPF   Sq Epi: x / Non Sq Epi: 3 /HPF / Bacteria: Negative        Venous Blood Gas:   @ 03:50  7.31/38/86/19/97.6  VBG Lactate: 0.9  Venous Blood Gas:   @ 17:50  7.28/42/47/20/83.2  VBG Lactate: 0.9  Venous Blood Gas:   @ 05:20  7.20/44/62/17/91.6  VBG Lactate: 1.6  Venous Blood Gas:   @ 20:15  7.18/51/54/19/82.9  VBG Lactate: 1.2

## 2023-01-18 NOTE — CONSULT NOTE ADULT - ASSESSMENT
56 y/o female with pmh of Hypothyroidism, HTN, T2DM, CKD, PAH, asthma, and HLD who presented to the ED for respiratory distress. Course complicated by bilateral pleural effusions, ISAMAR on CKD, and hyponatremia.

## 2023-01-18 NOTE — CONSULT NOTE ADULT - PROBLEM SELECTOR RECOMMENDATION 2
Cr: 1.05, uptrended to 1.56  After receiving IV lasix 40 mg IVPx1  FEUrea: 18.1%, demonstrating signs of pre-renal disease  Monitor I&Os  Avoid nephrotoxic agents  Monitor Cr daily Cr: 1.05, uptrended to 1.56  After receiving IV lasix 40 mg IVPx1  FEUrea: 18.1%, demonstrating signs of pre-renal disease in setting of likely cardiorenal syndrome  Monitor Cr  Monitor I&Os  Avoid nephrotoxic agents  Monitor Cr daily

## 2023-01-18 NOTE — PATIENT PROFILE ADULT - LEGAL HELP
Arterial Line      Patient reassessed immediately prior to procedure    Patient location during procedure: holding area  Start time: 3/2/2022 9:56 AM  Stop Time:3/2/2022 9:59 AM       Line placed for hemodynamic monitoring.  Performed By   Anesthesiologist: Kaleb Antoine MD  Preanesthetic Checklist  Completed: patient identified, IV checked, risks and benefits discussed, surgical consent, monitors and equipment checked, pre-op evaluation and timeout performed  Arterial Line Prep   Sterile Tech: gloves and cap  Prep: ChloraPrep  Patient monitoring: blood pressure monitoring, continuous pulse oximetry and EKG  Arterial Line Procedure   Laterality:left  Location:  radial artery  Catheter size: 20 G   Guidance: landmark technique  Number of attempts: 1  Successful placement: yes  Post Assessment   Dressing Type: occlusive dressing applied and wrist guard applied.   Complications no  Circ/Move/Sens Assessment: normal and unchanged.   Patient Tolerance: patient tolerated the procedure well with no apparent complications             no

## 2023-01-18 NOTE — PROGRESS NOTE ADULT - PROBLEM SELECTOR PLAN 2
Noted with profound bradycardia on admission requiring trancutaneous pacing  - believed to be in setting of hypothyroidism  - management as above  - HRs now back to normal  - cont to monitor on telemetry Unclear etiology   - Normal rectal exam per H&P and no overt s/s of bleeding   - S/p 1U PRBCs with appropriate response, required PRBCs during previous admission as well   - No RP bleed on CT   - Monitor CBC  - Added on LDH/Hapto  - Iron studies with AOCD   - ?? Autoimmune vs. heme etiology?? SPEP, immunofixation in AM  - Consider heme eval if no improvement   - Also w/ mild thrombocytopenia

## 2023-01-18 NOTE — PROGRESS NOTE ADULT - PROBLEM SELECTOR PLAN 7
c/w home atorva 40mg qhs Holding BP meds in setting of possible sepsis, reassess and add back as indicated

## 2023-01-18 NOTE — PATIENT PROFILE ADULT - FALL HARM RISK - HARM RISK INTERVENTIONS

## 2023-01-18 NOTE — PROGRESS NOTE ADULT - ASSESSMENT
Shila Hernandez is a 57 year old female with pmh of HTN, DM, pHTN, CKD presenting with dizziness, found to be profoundly bradycardic to the 30s, also noted to have a potassium of 6.9 on inital laboratory work. Patient admitted to MICU for management of hemodynamic instability and nephrology evaluation.  57F  w/ recent myxedema coma (hospitalized from 12/16/22 to 1/4/23, requiring intubation), hypothyroidism, hypertension, diabetes, hyperlipidemia, CKD, pulmonary hypertension, asthma on albuterol presented to the ED w/ respiratory distress.

## 2023-01-18 NOTE — PROGRESS NOTE ADULT - SUBJECTIVE AND OBJECTIVE BOX
[facility] Division of Hospital Medicine  Ibis Bright MD  Pager: [phone]      Patient is a 57y old  Female who presents with a chief complaint of Respiratory distress (2023 12:18)      SUBJECTIVE / OVERNIGHT EVENTS: patient seen and examined, complaining of "congestion" in her throat and chest, states she is having trouble breathing     MEDICATIONS  (STANDING):  atorvastatin 40 milliGRAM(s) Oral at bedtime  buMETAnide Injectable 1 milliGRAM(s) IV Push every 12 hours  dextrose 5%. 1000 milliLiter(s) (50 mL/Hr) IV Continuous <Continuous>  dextrose 5%. 1000 milliLiter(s) (100 mL/Hr) IV Continuous <Continuous>  dextrose 50% Injectable 25 Gram(s) IV Push once  dextrose 50% Injectable 12.5 Gram(s) IV Push once  dextrose 50% Injectable 25 Gram(s) IV Push once  glucagon  Injectable 1 milliGRAM(s) IntraMuscular once  influenza   Vaccine 0.5 milliLiter(s) IntraMuscular once  insulin lispro (ADMELOG) corrective regimen sliding scale   SubCutaneous three times a day before meals  insulin lispro (ADMELOG) corrective regimen sliding scale   SubCutaneous at bedtime  levothyroxine 125 MICROGram(s) Oral daily  piperacillin/tazobactam IVPB.. 3.375 Gram(s) IV Intermittent every 8 hours    MEDICATIONS  (PRN):  acetaminophen     Tablet .. 650 milliGRAM(s) Oral every 6 hours PRN Temp greater or equal to 38C (100.4F), Mild Pain (1 - 3)  aluminum hydroxide/magnesium hydroxide/simethicone Suspension 30 milliLiter(s) Oral every 4 hours PRN Dyspepsia  dextrose Oral Gel 15 Gram(s) Oral once PRN Blood Glucose LESS THAN 70 milliGRAM(s)/deciliter  guaiFENesin Oral Liquid (Sugar-Free) 200 milliGRAM(s) Oral every 6 hours PRN Cough  melatonin 3 milliGRAM(s) Oral at bedtime PRN Insomnia  ondansetron Injectable 4 milliGRAM(s) IV Push every 8 hours PRN Nausea and/or Vomiting      CAPILLARY BLOOD GLUCOSE      POCT Blood Glucose.: 184 mg/dL (2023 12:12)  POCT Blood Glucose.: 109 mg/dL (2023 09:09)  POCT Blood Glucose.: 128 mg/dL (2023 06:39)  POCT Blood Glucose.: 71 mg/dL (2023 05:17)  POCT Blood Glucose.: 139 mg/dL (2023 23:55)  POCT Blood Glucose.: 83 mg/dL (2023 22:46)  POCT Blood Glucose.: 67 mg/dL (2023 21:44)  POCT Blood Glucose.: 90 mg/dL (2023 18:11)    I&O's Summary      PHYSICAL EXAM:  Vital Signs Last 24 Hrs  T(C): 36.7 (2023 11:19), Max: 36.7 (2023 11:19)  T(F): 98 (2023 11:19), Max: 98 (2023 11:19)  HR: 81 (2023 14:57) (76 - 88)  BP: 176/81 (2023 14:57) (144/86 - 176/81)  BP(mean): --  RR: 20 (2023 14:57) (12 - 24)  SpO2: 98% (2023 14:57) (98% - 100%)    Parameters below as of 2023 14:57  Patient On (Oxygen Delivery Method): BiPAP/CPAP        CONSTITUTIONAL: NAD, well-developed, well-groomed  EYES: PERRLA; conjunctiva and sclera clear  ENMT: Moist oral mucosa, no pharyngeal injection or exudates; normal dentition  NECK: Supple, no palpable masses; no thyromegaly  RESPIRATORY: Normal respiratory effort; lungs are clear to auscultation bilaterally  CARDIOVASCULAR:  S1/S2; No lower extremity edema  ABDOMEN: Nontender to palpation, normoactive bowel sounds, no rebound/guarding  MUSCULOSKELETAL:  no clubbing or cyanosis of digits; no joint swelling or tenderness to palpation  PSYCH: A+O to person, place, and time; affect appropriate  NEUROLOGY: no focal deficits  SKIN: No rashes; no palpable lesions    LABS:                        7.7    7.66  )-----------( 124      ( 2023 03:50 )             22.8         123<L>  |  92<L>  |  24<H>  ----------------------------<  127<H>  5.1   |  18<L>  |  1.56<H>    Ca    8.8      2023 06:00  Phos  4.5       Mg     1.70         TPro  6.3  /  Alb  3.4  /  TBili  0.6  /  DBili  x   /  AST  33<H>  /  ALT  34<H>  /  AlkPhos  323<H>      PT/INR - ( 2023 20:15 )   PT: 11.9 sec;   INR: 1.03 ratio         PTT - ( 2023 20:15 )  PTT:37.3 sec      Urinalysis Basic - ( 2023 05:58 )    Color: Colorless / Appearance: Clear / S.011 / pH: x  Gluc: x / Ketone: Negative  / Bili: Negative / Urobili: <2 mg/dL   Blood: x / Protein: 30 mg/dL / Nitrite: Negative   Leuk Esterase: Negative / RBC: 4 /HPF / WBC 3 /HPF   Sq Epi: x / Non Sq Epi: 3 /HPF / Bacteria: Negative        Culture - Urine (collected 2023 11:40)  Source: Clean Catch Clean Catch (Midstream)  Final Report (2023 14:46):    >=3 organisms. Probable collection contamination.        RADIOLOGY & ADDITIONAL TESTS:  Results Reviewed:   Imaging Personally Reviewed:  Electrocardiogram Personally Reviewed:    COORDINATION OF CARE:  Care Discussed with Consultants/Other Providers [Y/N]:  Prior or Outpatient Records Reviewed [Y/N]:

## 2023-01-18 NOTE — PROGRESS NOTE ADULT - ASSESSMENT
56 yo lady w/ hypothyroidism, HTN, T2DM, HLD, CKD, PAH, asthma on albuterol presented to the ED w/respiratory distress. Endocrine consulted for hypothyroidism and DM2.    Hypothyroidism, not in myxedema  Taking LT4 125mcg PO as prescribed. TSH 2.56 on 1/17/23. FT4 pending.   -F/u FT4  -Patient has not received 125mcg PO levothyroxine in 2 days for unknown reason. Will switch to IV formulation until taking all PO meds.  -Low sodium and hypothermia unlikely to be related to hypothyroidism (or adrenal insufficiency). Recommend pursuing non-endocrine workup. Nephrology consulted.    Controlled T2DM with hyperglycemia  Patient with a hx of DM2. A1c 6.9. Has had DM for 23 years. Takes repaglinide 2mg TIDac and Tresiba 1-12 units depending on BG. Checks BG at bedtime. Sometimes BG in the 200s. No lows. Has a good appetite.  -Hold non-insulin DM agents while inpatient  -BG well-controlled with minimal insulin  -Use low dose Admelog correction scale pre-meal  -Use low dose Admelog correction scale at bedtime  -Fingerstick BG before meals and bedtime  -Goal -180  -Carbohydrate consistent diet  Discharge plan:  -Likely to discharge patient home on home regimen. Final regimen pending clinical course.  -Recommend routine outpatient ophthalmology and PCP versus endocrinology f/u    HLD  -On atorvastatin 40mg daily. Continue this if no contraindications.    José Antonio Carrasco DO, Endocrinology Fellow  For follow-up questions, discharge recommendations, or new consults please call answering service at [phone] (weekdays), [phone] (nights/weekends). For nonurgent matters, please email [email or web address] or [email or web address]. 58 yo lady w/ hypothyroidism, HTN, T2DM, HLD, CKD, PAH, asthma on albuterol presented to the ED w/respiratory distress. Endocrine consulted for hypothyroidism and DM2.    Hypothyroidism, not in myxedema  Taking LT4 125mcg PO as prescribed. TSH 2.56 on 1/17/23. FT4 pending.   -F/u FT4  -Patient has not received 125mcg PO levothyroxine in 2 days for unknown reason. Will switch to IV formulation until taking all PO meds. Will dose 94mcg daily IV.  -Low sodium and hypothermia unlikely to be related to hypothyroidism (or adrenal insufficiency). Recommend pursuing non-endocrine workup. Nephrology consulted.    Controlled T2DM with hyperglycemia  Patient with a hx of DM2. A1c 6.9. Has had DM for 23 years. Takes repaglinide 2mg TIDac and Tresiba 1-12 units depending on BG. Checks BG at bedtime. Sometimes BG in the 200s. No lows. Has a good appetite.  -Hold non-insulin DM agents while inpatient  -BG well-controlled with minimal insulin  -Use low dose Admelog correction scale pre-meal  -Use low dose Admelog correction scale at bedtime  -Fingerstick BG before meals and bedtime  -Goal -180  -Carbohydrate consistent diet  Discharge plan:  -Likely to discharge patient home on home regimen. Final regimen pending clinical course.  -Recommend routine outpatient ophthalmology and PCP versus endocrinology f/u    HLD  -On atorvastatin 40mg daily. Continue this if no contraindications.    José Antonio Carrasco DO, Endocrinology Fellow  For follow-up questions, discharge recommendations, or new consults please call answering service at [phone] (weekdays), [phone] (nights/weekends). For nonurgent matters, please email [email or web address] or [email or web address].

## 2023-01-18 NOTE — CONSULT NOTE ADULT - ASSESSMENT
57F hypothyroidism (recent admissions for myxedema coma), HF? / moderate pHTN (TTE , unclear volume status at that time, normal LV/RV size and function), asthma, HTN, T2DM, hyperlipidemia, CKD, presented to the ED with shortness of breath found to have acute hypoxemic resp failure. Pulm consulted for further management.    Assessment: Respiratory failure likely multifactorial in setting of volume overload and possible PNA. Patient has moderate bilateral pleural effusions on imaging with dense consolidations bilaterally. She has lower extremity edema. Pro BNP 1600. She was hypothermic in ED. She had increased WOB on  and was started on bilevel. She has received diuretics and she appears improved. Started on broad spectrum abx. Unclear clinical picture, may have serositis given pleural and pericardial effusions. No known autoimmune or rheum disease. No findings on exam such as rash or abnormal hand findings.     Plan:  C/w diuresis  C/w abx  Rheum labs ordered  C/w bilevel for now for WOB. Can trial off bilevel around dinner time tonight. If she can stay off bilevel for 15 minutes then can let her have dinner and then wait 30-60 minutes after she finishes eating to put back on the bilevel. Patient should wear bilevel overnight  Nasal cannula ATC while not on bilevel target 92-94% (can try 6 liters initially and wean as able)  Please order duonebs q6 standing   Check sputum culture if able  Check TTE when more euvolemic to assess for pHTN  Pulm will follow    This patient will need to close hospital follow up after discharge with the pulmonary team:    Please email: [email or web address] to setup an a close hospital follow up appointment for the patient  prior to discharge with any available pulmonologist. The appointment should be within 1 week of discharge from the hospital. Include the patient's name, , MRN and contact information in the email.      [facility]  [address] [place] [address]  [phone]    Please discuss the appointment details with the patient and include the appointment details in the patients discharge summary.

## 2023-01-19 DIAGNOSIS — E78.49 OTHER HYPERLIPIDEMIA: ICD-10-CM

## 2023-01-19 DIAGNOSIS — E03.9 HYPOTHYROIDISM, UNSPECIFIED: ICD-10-CM

## 2023-01-19 LAB
% ALBUMIN: 44 % — SIGNIFICANT CHANGE UP
% ALPHA 1: 6.9 % — SIGNIFICANT CHANGE UP
% ALPHA 2: 15.5 % — SIGNIFICANT CHANGE UP
% BETA: 18.3 % — SIGNIFICANT CHANGE UP
% GAMMA: 15.4 % — SIGNIFICANT CHANGE UP
ALBUMIN SERPL ELPH-MCNC: 2.82 G/DL — LOW (ref 3.3–4.4)
ALBUMIN SERPL ELPH-MCNC: 3.4 G/DL — SIGNIFICANT CHANGE UP (ref 3.3–5)
ALBUMIN/GLOB SERPL ELPH: 0.8 RATIO — SIGNIFICANT CHANGE UP
ALP SERPL-CCNC: 306 U/L — HIGH (ref 40–120)
ALPHA1 GLOB SERPL ELPH-MCNC: 0.44 G/DL — HIGH (ref 0.1–0.3)
ALPHA2 GLOB SERPL ELPH-MCNC: 1 G/DL — SIGNIFICANT CHANGE UP (ref 0.6–1)
ALT FLD-CCNC: 38 U/L — HIGH (ref 4–33)
ANION GAP SERPL CALC-SCNC: 12 MMOL/L — SIGNIFICANT CHANGE UP (ref 7–14)
ANION GAP SERPL CALC-SCNC: 12 MMOL/L — SIGNIFICANT CHANGE UP (ref 7–14)
ANISOCYTOSIS BLD QL: SLIGHT — SIGNIFICANT CHANGE UP
AST SERPL-CCNC: 38 U/L — HIGH (ref 4–32)
B-GLOBULIN SERPL ELPH-MCNC: 1.17 G/DL — HIGH (ref 0.6–1.1)
BASE EXCESS BLDV CALC-SCNC: -7.7 MMOL/L — LOW (ref -2–3)
BASOPHILS # BLD AUTO: 0 K/UL — SIGNIFICANT CHANGE UP (ref 0–0.2)
BASOPHILS NFR BLD AUTO: 0 % — SIGNIFICANT CHANGE UP (ref 0–2)
BILIRUB SERPL-MCNC: 0.7 MG/DL — SIGNIFICANT CHANGE UP (ref 0.2–1.2)
BUN SERPL-MCNC: 29 MG/DL — HIGH (ref 7–23)
BUN SERPL-MCNC: 31 MG/DL — HIGH (ref 7–23)
C3 SERPL-MCNC: 128 MG/DL — SIGNIFICANT CHANGE UP (ref 90–180)
C4 SERPL-MCNC: 46 MG/DL — HIGH (ref 10–40)
CA-I SERPL-SCNC: 1.25 MMOL/L — SIGNIFICANT CHANGE UP (ref 1.15–1.33)
CALCIUM SERPL-MCNC: 8.6 MG/DL — SIGNIFICANT CHANGE UP (ref 8.4–10.5)
CALCIUM SERPL-MCNC: 8.8 MG/DL — SIGNIFICANT CHANGE UP (ref 8.4–10.5)
CHLORIDE BLDV-SCNC: 92 MMOL/L — LOW (ref 96–108)
CHLORIDE SERPL-SCNC: 89 MMOL/L — LOW (ref 98–107)
CHLORIDE SERPL-SCNC: 91 MMOL/L — LOW (ref 98–107)
CO2 BLDV-SCNC: 20.7 MMOL/L — LOW (ref 22–26)
CO2 SERPL-SCNC: 18 MMOL/L — LOW (ref 22–31)
CO2 SERPL-SCNC: 20 MMOL/L — LOW (ref 22–31)
CREAT ?TM UR-MCNC: 23 MG/DL — SIGNIFICANT CHANGE UP
CREAT SERPL-MCNC: 2.01 MG/DL — HIGH (ref 0.5–1.3)
CREAT SERPL-MCNC: 2.24 MG/DL — HIGH (ref 0.5–1.3)
EGFR: 25 ML/MIN/1.73M2 — LOW
EGFR: 29 ML/MIN/1.73M2 — LOW
EOSINOPHIL # BLD AUTO: 0 K/UL — SIGNIFICANT CHANGE UP (ref 0–0.5)
EOSINOPHIL NFR BLD AUTO: 0 % — SIGNIFICANT CHANGE UP (ref 0–6)
GAMMA GLOBULIN: 0.99 G/DL — SIGNIFICANT CHANGE UP (ref 0.7–1.7)
GAS PNL BLDV: 122 MMOL/L — LOW (ref 136–145)
GAS PNL BLDV: SIGNIFICANT CHANGE UP
GIANT PLATELETS BLD QL SMEAR: PRESENT — SIGNIFICANT CHANGE UP
GLUCOSE BLDC GLUCOMTR-MCNC: 143 MG/DL — HIGH (ref 70–99)
GLUCOSE BLDC GLUCOMTR-MCNC: 149 MG/DL — HIGH (ref 70–99)
GLUCOSE BLDC GLUCOMTR-MCNC: 240 MG/DL — HIGH (ref 70–99)
GLUCOSE BLDC GLUCOMTR-MCNC: 262 MG/DL — HIGH (ref 70–99)
GLUCOSE BLDV-MCNC: 130 MG/DL — HIGH (ref 70–99)
GLUCOSE SERPL-MCNC: 138 MG/DL — HIGH (ref 70–99)
GLUCOSE SERPL-MCNC: 230 MG/DL — HIGH (ref 70–99)
HAPTOGLOB SERPL-MCNC: 69 MG/DL — SIGNIFICANT CHANGE UP (ref 34–200)
HAV IGM SER-ACNC: SIGNIFICANT CHANGE UP
HBV CORE IGM SER-ACNC: SIGNIFICANT CHANGE UP
HBV SURFACE AG SER-ACNC: SIGNIFICANT CHANGE UP
HCO3 BLDV-SCNC: 19 MMOL/L — LOW (ref 22–29)
HCT VFR BLD CALC: 23.4 % — LOW (ref 34.5–45)
HCT VFR BLDA CALC: 23 % — LOW (ref 34.5–46.5)
HCV AB S/CO SERPL IA: 0.13 S/CO — SIGNIFICANT CHANGE UP (ref 0–0.99)
HCV AB SERPL-IMP: SIGNIFICANT CHANGE UP
HGB BLD CALC-MCNC: 7.7 G/DL — LOW (ref 11.5–15.5)
HGB BLD-MCNC: 8 G/DL — LOW (ref 11.5–15.5)
HIV 1+2 AB+HIV1 P24 AG SERPL QL IA: SIGNIFICANT CHANGE UP
IANC: 14.21 K/UL — HIGH (ref 1.8–7.4)
KAPPA LC SER QL IFE: 8.92 MG/DL — HIGH (ref 0.33–1.94)
KAPPA/LAMBDA FREE LIGHT CHAIN RATIO, SERUM: 2.35 RATIO — HIGH (ref 0.26–1.65)
LACTATE BLDV-MCNC: 0.8 MMOL/L — SIGNIFICANT CHANGE UP (ref 0.5–2)
LAMBDA LC SER QL IFE: 3.79 MG/DL — HIGH (ref 0.57–2.63)
LDH SERPL L TO P-CCNC: 349 U/L — HIGH (ref 135–225)
LYMPHOCYTES # BLD AUTO: 0.26 K/UL — LOW (ref 1–3.3)
LYMPHOCYTES # BLD AUTO: 1.7 % — LOW (ref 13–44)
MAGNESIUM SERPL-MCNC: 1.6 MG/DL — SIGNIFICANT CHANGE UP (ref 1.6–2.6)
MAGNESIUM SERPL-MCNC: 1.7 MG/DL — SIGNIFICANT CHANGE UP (ref 1.6–2.6)
MANUAL SMEAR VERIFICATION: SIGNIFICANT CHANGE UP
MCHC RBC-ENTMCNC: 26.8 PG — LOW (ref 27–34)
MCHC RBC-ENTMCNC: 34.2 GM/DL — SIGNIFICANT CHANGE UP (ref 32–36)
MCV RBC AUTO: 78.3 FL — LOW (ref 80–100)
MICROCYTES BLD QL: SLIGHT — SIGNIFICANT CHANGE UP
MONOCYTES # BLD AUTO: 0 K/UL — SIGNIFICANT CHANGE UP (ref 0–0.9)
MONOCYTES NFR BLD AUTO: 0 % — LOW (ref 2–14)
MRSA PCR RESULT.: SIGNIFICANT CHANGE UP
NEUTROPHILS # BLD AUTO: 15.06 K/UL — HIGH (ref 1.8–7.4)
NEUTROPHILS NFR BLD AUTO: 93.9 % — HIGH (ref 43–77)
NEUTS BAND # BLD: 4.4 % — SIGNIFICANT CHANGE UP (ref 0–6)
OVALOCYTES BLD QL SMEAR: SLIGHT — SIGNIFICANT CHANGE UP
PCO2 BLDV: 46 MMHG — HIGH (ref 39–42)
PH BLDV: 7.23 — LOW (ref 7.32–7.43)
PHOSPHATE SERPL-MCNC: 4.6 MG/DL — HIGH (ref 2.5–4.5)
PHOSPHATE SERPL-MCNC: 4.8 MG/DL — HIGH (ref 2.5–4.5)
PLAT MORPH BLD: NORMAL — SIGNIFICANT CHANGE UP
PLATELET # BLD AUTO: 102 K/UL — LOW (ref 150–400)
PLATELET COUNT - ESTIMATE: ABNORMAL
PO2 BLDV: 57 MMHG — SIGNIFICANT CHANGE UP
POIKILOCYTOSIS BLD QL AUTO: SLIGHT — SIGNIFICANT CHANGE UP
POLYCHROMASIA BLD QL SMEAR: SLIGHT — SIGNIFICANT CHANGE UP
POTASSIUM BLDV-SCNC: 5.1 MMOL/L — SIGNIFICANT CHANGE UP (ref 3.5–5.1)
POTASSIUM SERPL-MCNC: 5.1 MMOL/L — SIGNIFICANT CHANGE UP (ref 3.5–5.3)
POTASSIUM SERPL-MCNC: 5.1 MMOL/L — SIGNIFICANT CHANGE UP (ref 3.5–5.3)
POTASSIUM SERPL-SCNC: 5.1 MMOL/L — SIGNIFICANT CHANGE UP (ref 3.5–5.3)
POTASSIUM SERPL-SCNC: 5.1 MMOL/L — SIGNIFICANT CHANGE UP (ref 3.5–5.3)
PROT ?TM UR-MCNC: 116 MG/DL — SIGNIFICANT CHANGE UP
PROT PATTERN SERPL ELPH-IMP: SIGNIFICANT CHANGE UP
PROT SERPL-MCNC: 6.4 G/DL — SIGNIFICANT CHANGE UP
PROT SERPL-MCNC: 6.4 G/DL — SIGNIFICANT CHANGE UP (ref 6–8.3)
PROT SERPL-MCNC: 6.4 G/DL — SIGNIFICANT CHANGE UP (ref 6–8.3)
PROT/CREAT UR-RTO: 5.1 RATIO — HIGH (ref 0–0.2)
RBC # BLD: 2.99 M/UL — LOW (ref 3.8–5.2)
RBC # BLD: 2.99 M/UL — LOW (ref 3.8–5.2)
RBC # FLD: 14.9 % — HIGH (ref 10.3–14.5)
RBC BLD AUTO: ABNORMAL
RETICS #: 64 K/UL — SIGNIFICANT CHANGE UP (ref 25–125)
RETICS/RBC NFR: 2.1 % — SIGNIFICANT CHANGE UP (ref 0.5–2.5)
S AUREUS DNA NOSE QL NAA+PROBE: DETECTED
SAO2 % BLDV: 89.4 % — SIGNIFICANT CHANGE UP
SODIUM SERPL-SCNC: 121 MMOL/L — LOW (ref 135–145)
SODIUM SERPL-SCNC: 121 MMOL/L — LOW (ref 135–145)
URATE SERPL-MCNC: 6.3 MG/DL — SIGNIFICANT CHANGE UP (ref 2.5–7)
VANCOMYCIN FLD-MCNC: 10.1 UG/ML — SIGNIFICANT CHANGE UP
WBC # BLD: 15.32 K/UL — HIGH (ref 3.8–10.5)
WBC # FLD AUTO: 15.32 K/UL — HIGH (ref 3.8–10.5)

## 2023-01-19 PROCEDURE — 99223 1ST HOSP IP/OBS HIGH 75: CPT

## 2023-01-19 PROCEDURE — 99233 SBSQ HOSP IP/OBS HIGH 50: CPT | Mod: GC

## 2023-01-19 PROCEDURE — 86335 IMMUNFIX E-PHORSIS/URINE/CSF: CPT | Mod: 26

## 2023-01-19 PROCEDURE — 84165 PROTEIN E-PHORESIS SERUM: CPT | Mod: 26

## 2023-01-19 PROCEDURE — 93306 TTE W/DOPPLER COMPLETE: CPT | Mod: 26

## 2023-01-19 PROCEDURE — 99233 SBSQ HOSP IP/OBS HIGH 50: CPT

## 2023-01-19 PROCEDURE — 99232 SBSQ HOSP IP/OBS MODERATE 35: CPT

## 2023-01-19 PROCEDURE — 86334 IMMUNOFIX E-PHORESIS SERUM: CPT | Mod: 26

## 2023-01-19 RX ORDER — IPRATROPIUM/ALBUTEROL SULFATE 18-103MCG
3 AEROSOL WITH ADAPTER (GRAM) INHALATION EVERY 6 HOURS
Refills: 0 | Status: DISCONTINUED | OUTPATIENT
Start: 2023-01-19 | End: 2023-01-31

## 2023-01-19 RX ORDER — INSULIN GLARGINE 100 [IU]/ML
2 INJECTION, SOLUTION SUBCUTANEOUS AT BEDTIME
Refills: 0 | Status: DISCONTINUED | OUTPATIENT
Start: 2023-01-19 | End: 2023-01-21

## 2023-01-19 RX ORDER — PANTOPRAZOLE SODIUM 20 MG/1
40 TABLET, DELAYED RELEASE ORAL DAILY
Refills: 0 | Status: DISCONTINUED | OUTPATIENT
Start: 2023-01-19 | End: 2023-01-31

## 2023-01-19 RX ORDER — INSULIN LISPRO 100/ML
2 VIAL (ML) SUBCUTANEOUS
Refills: 0 | Status: DISCONTINUED | OUTPATIENT
Start: 2023-01-19 | End: 2023-01-21

## 2023-01-19 RX ADMIN — PIPERACILLIN AND TAZOBACTAM 25 GRAM(S): 4; .5 INJECTION, POWDER, LYOPHILIZED, FOR SOLUTION INTRAVENOUS at 05:18

## 2023-01-19 RX ADMIN — HEPARIN SODIUM 5000 UNIT(S): 5000 INJECTION INTRAVENOUS; SUBCUTANEOUS at 21:26

## 2023-01-19 RX ADMIN — Medication 2: at 17:38

## 2023-01-19 RX ADMIN — Medication 2 UNIT(S): at 17:37

## 2023-01-19 RX ADMIN — HEPARIN SODIUM 5000 UNIT(S): 5000 INJECTION INTRAVENOUS; SUBCUTANEOUS at 05:18

## 2023-01-19 RX ADMIN — PIPERACILLIN AND TAZOBACTAM 25 GRAM(S): 4; .5 INJECTION, POWDER, LYOPHILIZED, FOR SOLUTION INTRAVENOUS at 14:32

## 2023-01-19 RX ADMIN — ATORVASTATIN CALCIUM 40 MILLIGRAM(S): 80 TABLET, FILM COATED ORAL at 21:26

## 2023-01-19 RX ADMIN — Medication 3 MILLILITER(S): at 11:20

## 2023-01-19 RX ADMIN — Medication 3 MILLILITER(S): at 21:26

## 2023-01-19 RX ADMIN — Medication 3 MILLILITER(S): at 16:31

## 2023-01-19 RX ADMIN — INSULIN GLARGINE 2 UNIT(S): 100 INJECTION, SOLUTION SUBCUTANEOUS at 21:26

## 2023-01-19 RX ADMIN — BUMETANIDE 1 MILLIGRAM(S): 0.25 INJECTION INTRAMUSCULAR; INTRAVENOUS at 17:39

## 2023-01-19 RX ADMIN — PIPERACILLIN AND TAZOBACTAM 25 GRAM(S): 4; .5 INJECTION, POWDER, LYOPHILIZED, FOR SOLUTION INTRAVENOUS at 22:02

## 2023-01-19 RX ADMIN — Medication 250 MILLIGRAM(S): at 11:25

## 2023-01-19 RX ADMIN — Medication 3: at 12:17

## 2023-01-19 RX ADMIN — BUMETANIDE 1 MILLIGRAM(S): 0.25 INJECTION INTRAMUSCULAR; INTRAVENOUS at 05:18

## 2023-01-19 RX ADMIN — HEPARIN SODIUM 5000 UNIT(S): 5000 INJECTION INTRAVENOUS; SUBCUTANEOUS at 14:32

## 2023-01-19 RX ADMIN — PANTOPRAZOLE SODIUM 40 MILLIGRAM(S): 20 TABLET, DELAYED RELEASE ORAL at 11:25

## 2023-01-19 RX ADMIN — ONDANSETRON 4 MILLIGRAM(S): 8 TABLET, FILM COATED ORAL at 10:12

## 2023-01-19 NOTE — PROGRESS NOTE ADULT - SUBJECTIVE AND OBJECTIVE BOX
Patient is a 57y old  Female who presents with a chief complaint of Respiratory distress (2023 15:24)      SUBJECTIVE / OVERNIGHT EVENTS:    Patient seen and examined at bedside. No acute events overnight.    T(F): 97.1 ( @ 12:23), Max: 98 ( @ 22:37)  HR: 71 ( @ 18:39) (68 - 94)  BP: 150/84 ( @ 12:23) (142/76 - 180/80)  RR: 19 ( @ 12:23) (19 - 20)  SpO2: 95% ( @ 18:39) (94% - 100%)    I&O's Summary      MEDICATIONS  (STANDING):  albuterol/ipratropium for Nebulization 3 milliLiter(s) Nebulizer every 6 hours  atorvastatin 40 milliGRAM(s) Oral at bedtime  buMETAnide Injectable 1 milliGRAM(s) IV Push every 12 hours  dextrose 5%. 1000 milliLiter(s) (50 mL/Hr) IV Continuous <Continuous>  dextrose 5%. 1000 milliLiter(s) (100 mL/Hr) IV Continuous <Continuous>  dextrose 50% Injectable 25 Gram(s) IV Push once  dextrose 50% Injectable 12.5 Gram(s) IV Push once  dextrose 50% Injectable 25 Gram(s) IV Push once  glucagon  Injectable 1 milliGRAM(s) IntraMuscular once  heparin   Injectable 5000 Unit(s) SubCutaneous every 8 hours  influenza   Vaccine 0.5 milliLiter(s) IntraMuscular once  insulin glargine Injectable (LANTUS) 2 Unit(s) SubCutaneous at bedtime  insulin lispro (ADMELOG) corrective regimen sliding scale   SubCutaneous three times a day before meals  insulin lispro (ADMELOG) corrective regimen sliding scale   SubCutaneous at bedtime  insulin lispro Injectable (ADMELOG) 2 Unit(s) SubCutaneous three times a day before meals  levothyroxine Injectable 94 MICROGram(s) IV Push at bedtime  pantoprazole  Injectable 40 milliGRAM(s) IV Push daily  piperacillin/tazobactam IVPB.. 3.375 Gram(s) IV Intermittent every 8 hours    MEDICATIONS  (PRN):  acetaminophen     Tablet .. 650 milliGRAM(s) Oral every 6 hours PRN Temp greater or equal to 38C (100.4F), Mild Pain (1 - 3)  aluminum hydroxide/magnesium hydroxide/simethicone Suspension 30 milliLiter(s) Oral every 4 hours PRN Dyspepsia  dextrose Oral Gel 15 Gram(s) Oral once PRN Blood Glucose LESS THAN 70 milliGRAM(s)/deciliter  guaiFENesin Oral Liquid (Sugar-Free) 200 milliGRAM(s) Oral every 6 hours PRN Cough  melatonin 3 milliGRAM(s) Oral at bedtime PRN Insomnia  ondansetron Injectable 4 milliGRAM(s) IV Push every 8 hours PRN Nausea and/or Vomiting      Constitutional: denies fevers, chills, night sweats, weight loss  HEENT: denies visual changes, cough  Cardiovascular: denies palpitations, chest pain, edema  Respiratory: denies SOB, wheezing  Gastrointestinal: denies N/V/D, abdominal pain, hematochezia, melena  : denies dysuria, urinary urgency, increased frequency  MSK: denies muscle weakness, joint pain  Skin: denies new rashes or masses  Heme: denies bleeding, bruising  Neuro: denies headache, weakness    PHYSICAL EXAM:   GEN: Age appropriate, resting comfortably in bed, no acute distress, non toxic appearing, speaking in complete sentences. Nasal cannula  HEENT: Conjunctiva and sclera normal  PULM: Lungs decreased breath sounds at the bases  CV: RRR, S1S2, no MRG  MSK: no stiffness or joint effusions  Abdominal: Soft, nontender to palpation, non-distended, +BS  Extremities: No edema or cyanosis  NEURO: AAOx3  Psych: normal affect, normal behavior  Skin: No rashes, lesions    LABS:  Labs personally reviewed.                        8.0    15.32 )-----------( 102      ( 2023 05:02 )             23.4     Hgb Trend: 8.0<--, 7.7<--, 7.9<--, 6.5<--, 7.1<--      121<L>  |  89<L>  |  31<H>  ----------------------------<  230<H>  5.1   |  20<L>  |  2.24<H>    Ca    8.6      2023 16:01  Phos  4.6       Mg     1.70         TPro  6.4  /  Alb  3.4  /  TBili  0.7  /  DBili  x   /  AST  38<H>  /  ALT  38<H>  /  AlkPhos  306<H>      Creatinine Trend: 2.24<--, 2.01<--, 1.56<--, 1.52<--, 1.35<--, 1.17<--    Urinalysis Basic - ( 2023 05:58 )    Color: Colorless / Appearance: Clear / S.011 / pH: x  Gluc: x / Ketone: Negative  / Bili: Negative / Urobili: <2 mg/dL   Blood: x / Protein: 30 mg/dL / Nitrite: Negative   Leuk Esterase: Negative / RBC: 4 /HPF / WBC 3 /HPF   Sq Epi: x / Non Sq Epi: 3 /HPF / Bacteria: Negative          [facility]  Pulmonary and Critical Care Fellow    PGY-5 Pager: [phone]  [phone]  [facility] Spectra [phone]   Night Float:

## 2023-01-19 NOTE — PROGRESS NOTE ADULT - ASSESSMENT
58 yo lady w/ hypothyroidism, HTN, T2DM, HLD, CKD, PAH, asthma on albuterol presented to the ED w/respiratory distress. Endocrine consulted for hypothyroidism and DM2.    Hypothyroidism, not in myxedema  Taking LT4 125mcg PO as prescribed. TSH 2.56 on 1/17/23. FT4 not done.   -check Free T4 with AM labs  -Patient had not received 125mcg PO levothyroxine in 2 days for unknown reason. Switched to IV formulation which she received last night. proceed with levothyroxine 94mcg daily IV for now. Plan to resume home oral dose at [place].  -Low sodium and hypothermia unlikely to be related to hypothyroidism (or adrenal insufficiency). Recommend pursuing non-endocrine workup. Nephrology consulted.    Controlled T2DM with hyperglycemia  Patient with a hx of DM2. A1c 6.9. Has had DM for 23 years. Takes repaglinide 2mg TIDac and Tresiba 1-12 units depending on BG. Checks BG at bedtime. Sometimes BG in the 200s. No lows. Has a good appetite.  -Hold non-insulin DM agents while inpatient  -BG target 100-180 - prandial elevations noted  Add Admelog 2/2/2 units premeal  Can add Lantus 2 units qhs  -continue low dose Admelog correction scale pre-meal  -continue low dose Admelog correction scale at bedtime  -Fingerstick BG before meals and bedtime  -Goal -180  -Carbohydrate consistent diet  Discharge plan:  -Likely to discharge patient home on home regimen. Final regimen pending clinical course.  -Recommend routine outpatient ophthalmology and PCP versus endocrinology f/u    HLD  -On atorvastatin 40mg daily. Continue this if no contraindications.    Citlalli Mejia MD  Division of Endocrinology  Pager: [phone]    If after 6PM or before 9AM, or on weekends/holidays, please call endocrine answering service for assistance ([phone]).  For nonurgent matters email [email or web address] for assistance.

## 2023-01-19 NOTE — PROGRESS NOTE ADULT - PROBLEM SELECTOR PLAN 4
Patient with two recent hospitalizations, one at [facility] and one at OSH for myxedema coma   - Endo following, currently no evidence of myxedema   - TSH wnl   - C/w iv synthroid

## 2023-01-19 NOTE — DIETITIAN INITIAL EVALUATION ADULT - OTHER INFO
Per chart, pt is 57 year old female PMH hypothyroidism, HTN, type 2 DM, HLF, CKD, pulmonary hypertension, recent myxedema coma (at OSH 12/16/22-1/4/23, requiring intubation) presenting with respiratory distress. CT chest with moderate pleural effusions. Nephrology following for hyponatremia, ISAMAR. Endocrinology following for hypothyroidism, diabetes. Pulmonology following for respiratory distress.     NKFA. Pt does not consume beef or pork. History of type 2 DM for >20 years, HbA1c (1/17) 6.9%; medications PTA inclusive of Tresiba 1-12U and Repaglinide 2mg TID. Pt monitors fingersticks at bedtime. Reported decreased PO intake x2 weeks PTA.     Pt on NC at time of visit. Pt continues on therapeutic diet. No noted GI distress, last BM 1/18 per flowsheets; no bowel regimen ordered at this time. Labs notable for hyperphosphatemia, hyperglycemia, hyponatremia.

## 2023-01-19 NOTE — PROGRESS NOTE ADULT - ASSESSMENT
57F  w/ recent myxedema coma (hospitalized from 12/16/22 to 1/4/23, requiring intubation), hypothyroidism, hypertension, diabetes, hyperlipidemia, CKD, pulmonary hypertension, asthma on albuterol presented to the ED w/ respiratory distress.

## 2023-01-19 NOTE — PROGRESS NOTE ADULT - SUBJECTIVE AND OBJECTIVE BOX
Chief Complaint: Hypothyroidism, DM2    History: resting in bed  denies complaints  tolerating po  reports breathing is improved    MEDICATIONS  (STANDING):  albuterol/ipratropium for Nebulization 3 milliLiter(s) Nebulizer every 6 hours  atorvastatin 40 milliGRAM(s) Oral at bedtime  buMETAnide Injectable 1 milliGRAM(s) IV Push every 12 hours  dextrose 5%. 1000 milliLiter(s) (50 mL/Hr) IV Continuous <Continuous>  dextrose 5%. 1000 milliLiter(s) (100 mL/Hr) IV Continuous <Continuous>  dextrose 50% Injectable 25 Gram(s) IV Push once  dextrose 50% Injectable 12.5 Gram(s) IV Push once  dextrose 50% Injectable 25 Gram(s) IV Push once  glucagon  Injectable 1 milliGRAM(s) IntraMuscular once  heparin   Injectable 5000 Unit(s) SubCutaneous every 8 hours  influenza   Vaccine 0.5 milliLiter(s) IntraMuscular once  insulin lispro (ADMELOG) corrective regimen sliding scale   SubCutaneous three times a day before meals  insulin lispro (ADMELOG) corrective regimen sliding scale   SubCutaneous at bedtime  levothyroxine Injectable 94 MICROGram(s) IV Push at bedtime  pantoprazole  Injectable 40 milliGRAM(s) IV Push daily  piperacillin/tazobactam IVPB.. 3.375 Gram(s) IV Intermittent every 8 hours    MEDICATIONS  (PRN):  acetaminophen     Tablet .. 650 milliGRAM(s) Oral every 6 hours PRN Temp greater or equal to 38C (100.4F), Mild Pain (1 - 3)  aluminum hydroxide/magnesium hydroxide/simethicone Suspension 30 milliLiter(s) Oral every 4 hours PRN Dyspepsia  dextrose Oral Gel 15 Gram(s) Oral once PRN Blood Glucose LESS THAN 70 milliGRAM(s)/deciliter  guaiFENesin Oral Liquid (Sugar-Free) 200 milliGRAM(s) Oral every 6 hours PRN Cough  melatonin 3 milliGRAM(s) Oral at bedtime PRN Insomnia  ondansetron Injectable 4 milliGRAM(s) IV Push every 8 hours PRN Nausea and/or Vomiting      Allergies    No Known Allergies    Intolerances      Review of Systems:  ALL OTHER SYSTEMS REVIEWED AND NEGATIVE      PHYSICAL EXAM:  VITALS: T(C): 36.2 (01-19-23 @ 12:23)  T(F): 97.1 (01-19-23 @ 12:23), Max: 98 (01-18-23 @ 22:37)  HR: 71 (01-19-23 @ 12:23) (71 - 84)  BP: 150/84 (01-19-23 @ 12:23) (142/76 - 180/80)  RR:  (19 - 22)  SpO2:  (94% - 100%)  Wt(kg): --  GENERAL: NAD, well-groomed, well-developed  EYES: No proptosis, no lid lag, anicteric  HEENT:  Atraumatic, Normocephalic, moist mucous membranes  RESPIRATORY: nonlabored, nasal cannula in place  PSYCH: Alert and oriented x 3, normal affect, normal mood    CAPILLARY BLOOD GLUCOSE      POCT Blood Glucose.: 262 mg/dL (19 Jan 2023 12:06)  POCT Blood Glucose.: 143 mg/dL (19 Jan 2023 08:21)  POCT Blood Glucose.: 209 mg/dL (18 Jan 2023 22:18)  POCT Blood Glucose.: 182 mg/dL (18 Jan 2023 18:47)  POCT Blood Glucose.: 169 mg/dL (18 Jan 2023 17:13)      01-19    121<L>  |  91<L>  |  29<H>  ----------------------------<  138<H>  5.1   |  18<L>  |  2.01<H>    eGFR: 29<L>    Ca    8.8      01-19  Mg     1.60     01-19  Phos  4.8     01-19    TPro  6.4  /  Alb  3.4  /  TBili  0.7  /  DBili  x   /  AST  38<H>  /  ALT  38<H>  /  AlkPhos  306<H>  01-19      A1C with Estimated Average Glucose Result: 6.9 % (01-17-23 @ 07:10)  A1C with Estimated Average Glucose Result: 9.7 % (11-25-22 @ 14:20)  A1C with Estimated Average Glucose Result: 9.1 % (09-29-22 @ 06:00)  A1C with Estimated Average Glucose Result: 9.2 % (09-28-22 @ 07:47)      Thyroid Function Tests:  01-18 @ 06:00 TSH -- FreeT4 -- T3 54 Anti TPO -- Anti Thyroglobulin Ab -- TSI --  01-18 @ 03:50 TSH 2.51 FreeT4 -- T3 54 Anti TPO -- Anti Thyroglobulin Ab -- TSI --

## 2023-01-19 NOTE — PROGRESS NOTE ADULT - PROBLEM SELECTOR PLAN 2
In the setting of fluid overload. SNa was low at 121. Repeat SNa improved to 123. Last SNa is low at 121 today. Alfie is at 47 U Osm is low at 194. Continue with IV diuretics as outlined above. restrict fluid intake <1L per day. Monitor SNa q12.    If you have any questions, please feel free to contact me  Chuck Harvey  Nephrology Fellow  [phone]/ [employer] Teams(Preferred)  (After 5pm or on weekends please page the on-call fellow).

## 2023-01-19 NOTE — PROGRESS NOTE ADULT - PROBLEM SELECTOR PLAN 1
Patient w/ hypothermia POA and tachypnea POA meeting sepsis criteria   - Worsening respiratory distress, placed on BIPAP from nasal cannula. Patient recently required intubation at OSH. Low threshold for MICU re-eval   - Pulm consulted, prior team discussed w/ fellow Dr. Grace - will consider therapeutic vs. diagnostic thoracentesis    - CTA chest with bilateral pleural effusions and GGO, no PE. CT A/P commenting on increasing basilar airspace opacity compared to prior examination with dense consolidation in both lower lobes and right greater than left pleural effusions, not significantly changed.  - Given consolidations on CT, will treat empirically with Zosyn + Vanco (by level) given recent hospitalizations. Check -f/u sputum cx and MRSA swab and legionella  -Stop Vanco if able   - TTE showed grossly normal left ventricular systolic function. Mild diastolic dysfunction (Stage I). Right ventricular enlargement with decreased right ventricular systolic function. Estimated right ventricular systolic pressure equals 55  mm Hg, assuming right atrial pressure equals 10 mm Hg, consistent with moderate pulmonary hypertension.  - Diuresis w/ bumex per renal recs   - Unclear the cause of her persistent hospitalizations for multiorgan failure, she does not appear to be in myxedema coma - is this rheumatologic vs. hematologic? f/u ANCAs, complement, STEVO, dsDNA + SPEP/UPEP, immunofixation   - Cards consulted, f/u consult recs, may need RHC

## 2023-01-19 NOTE — PHYSICAL THERAPY INITIAL EVALUATION ADULT - LEVEL OF INDEPENDENCE: SUPINE/SIT, REHAB EVAL
supervision
You can access the FollowMyHealth Patient Portal offered by [facility] by registering at the following website: [email or web address]. By joining [facility]’s FollowMyHealth portal, you will also be able to view your health information using other applications (apps) compatible with our system.

## 2023-01-19 NOTE — PROGRESS NOTE ADULT - PROBLEM SELECTOR PLAN 1
Pt with ISAMAR on CKD likely in the setting of fluid overload and IV contrast use. Scr was 1.17 on 1/7/23. CT scan with IV contrast was done on 1/17. Scr was elevated at 1.52 on 1/18. Scr subsequently is elevated at 2.01 today (1/19/23). Spot urine TP/CR is elevated at 5.9 (repeat at 5.1), UA showed trace proteins with 4 RBCs. Serological work was done in view of nephrotic range proteinuria. Serum C3 is not low (128), C4 is not low (46), HIV (NR), Kappa / lambda ratio within range for CKD. Awaiting STEVO, Hep Bs Ag, Hep C Ab, serum VEE and ANCA levels. Labs reviewed. Pt clinically in fluid overload. Agree with IV Bumex 1 mg BID for now. Follow up with Pulmonology regarding pleural effusion. Cardiology note from today reviewed.  Monitor labs and urine output. Avoid any potential nephrotoxins. Dose medications as per eGFR.

## 2023-01-19 NOTE — PROGRESS NOTE ADULT - PROBLEM SELECTOR PLAN 2
Unclear etiology   - Normal rectal exam per H&P and no overt s/s of bleeding   - S/p 1U PRBCs with appropriate response, required PRBCs during previous admission as well   - No RP bleed on CT   - Monitor CBC  -Hapto wnl, hiv neg  -  LDH elevated  - Iron studies with AOCD   - ?? Autoimmune vs. heme etiology?? SPEP, immunofixation   - Consider heme eval if no improvement   - Also w/ mild thrombocytopenia  -f/u fob

## 2023-01-19 NOTE — DIETITIAN INITIAL EVALUATION ADULT - PERTINENT LABORATORY DATA
(1/19) Na 121<L>, BUN 29<H>, Cr 2.01<H>, <H>, K+ 5.1, Phos 4.8<H>, Mg 1.60, Alk Phos 306<H>, ALT/SGPT 38<H>, AST/SGOT 38<H>  (1/17) HbA1c 6.9%<H>  POCT: (1/19) 143-262, (1/18) 109-209

## 2023-01-19 NOTE — PHYSICAL THERAPY INITIAL EVALUATION ADULT - PERTINENT HX OF CURRENT PROBLEM, REHAB EVAL
56 yo lady w/ hypothyroidism, HTN, T2DM, HLD, CKD, PAH, asthma on albuterol presented to the ED w/respiratory distress.  As per family, the patient was recently admitted at [facility] for myxedema coma, delirium, multiorgan failure patient was intubated at that time, received vasopressors, and broad-spectrum antibiotics as per dc paperwork. After seeing her primary care Dr. Fernandez yesterday she began to experience shortness of breath, which was not alleviated by her albuterol.

## 2023-01-19 NOTE — DIETITIAN INITIAL EVALUATION ADULT - ADD RECOMMEND
Recommend continue DASH/TLC consistent carbohydrate diet. Fluid restriction per medical discretion.  Recommend addition of Glucerna 1 PO 2x daily (provides 220 kcal, 10 gm protein per 8oz serving).  Obtain dosing and daily weights.  Will note food preferences within  system.

## 2023-01-19 NOTE — PROGRESS NOTE ADULT - SUBJECTIVE AND OBJECTIVE BOX
Patient is a 57y old  Female who presents with a chief complaint of Respiratory distress (2023 10:48)      SUBJECTIVE / OVERNIGHT EVENTS: Pt seen and examined at 11:25am, no overnight events, comfortable on current nasal canula oxygen, denies sob, chest pain or any other complaints. Per nsg used bipap only till 3am, no other new issues reported.    MEDICATIONS  (STANDING):  albuterol/ipratropium for Nebulization 3 milliLiter(s) Nebulizer every 6 hours  atorvastatin 40 milliGRAM(s) Oral at bedtime  buMETAnide Injectable 1 milliGRAM(s) IV Push every 12 hours  dextrose 5%. 1000 milliLiter(s) (50 mL/Hr) IV Continuous <Continuous>  dextrose 5%. 1000 milliLiter(s) (100 mL/Hr) IV Continuous <Continuous>  dextrose 50% Injectable 25 Gram(s) IV Push once  dextrose 50% Injectable 12.5 Gram(s) IV Push once  dextrose 50% Injectable 25 Gram(s) IV Push once  glucagon  Injectable 1 milliGRAM(s) IntraMuscular once  heparin   Injectable 5000 Unit(s) SubCutaneous every 8 hours  influenza   Vaccine 0.5 milliLiter(s) IntraMuscular once  insulin lispro (ADMELOG) corrective regimen sliding scale   SubCutaneous three times a day before meals  insulin lispro (ADMELOG) corrective regimen sliding scale   SubCutaneous at bedtime  levothyroxine Injectable 94 MICROGram(s) IV Push at bedtime  pantoprazole  Injectable 40 milliGRAM(s) IV Push daily  piperacillin/tazobactam IVPB.. 3.375 Gram(s) IV Intermittent every 8 hours    MEDICATIONS  (PRN):  acetaminophen     Tablet .. 650 milliGRAM(s) Oral every 6 hours PRN Temp greater or equal to 38C (100.4F), Mild Pain (1 - 3)  aluminum hydroxide/magnesium hydroxide/simethicone Suspension 30 milliLiter(s) Oral every 4 hours PRN Dyspepsia  dextrose Oral Gel 15 Gram(s) Oral once PRN Blood Glucose LESS THAN 70 milliGRAM(s)/deciliter  guaiFENesin Oral Liquid (Sugar-Free) 200 milliGRAM(s) Oral every 6 hours PRN Cough  melatonin 3 milliGRAM(s) Oral at bedtime PRN Insomnia  ondansetron Injectable 4 milliGRAM(s) IV Push every 8 hours PRN Nausea and/or Vomiting      Vital Signs Last 24 Hrs  T(C): 36.2 (2023 12:23), Max: 36.7 (2023 22:37)  T(F): 97.1 (2023 12:23), Max: 98 (2023 22:37)  HR: 71 (2023 12:23) (71 - 88)  BP: 150/84 (2023 12:23) (142/76 - 180/80)  BP(mean): 79 (2023 00:55) (79 - 107)  RR: 19 (2023 12:23) (19 - 22)  SpO2: 100% (2023 12:23) (94% - 100%)    Parameters below as of 2023 12:23  Patient On (Oxygen Delivery Method): nasal cannula  O2 Flow (L/min): 4    CAPILLARY BLOOD GLUCOSE      POCT Blood Glucose.: 262 mg/dL (2023 12:06)  POCT Blood Glucose.: 143 mg/dL (2023 08:21)  POCT Blood Glucose.: 209 mg/dL (2023 22:18)  POCT Blood Glucose.: 182 mg/dL (2023 18:47)  POCT Blood Glucose.: 169 mg/dL (2023 17:13)    I&O's Summary      PHYSICAL EXAM:  GENERAL: NAD, well-developed  CHEST/LUNG: bibasilar crackles+  HEART: Regular rate and rhythm  ABDOMEN: Soft, Nontender, mildly distended  EXTREMITIES: b/l LE trace edema  PSYCH: Calm  NEUROLOGY: AAOx3  SKIN: No rashes or lesions    LABS:                        8.0    15.32 )-----------( 102      ( 2023 05:02 )             23.4     01-    121<L>  |  91<L>  |  29<H>  ----------------------------<  138<H>  5.1   |  18<L>  |  2.01<H>    Ca    8.8      2023 05:02  Phos  4.8     -  Mg     1.60     -    TPro  6.4  /  Alb  3.4  /  TBili  0.7  /  DBili  x   /  AST  38<H>  /  ALT  38<H>  /  AlkPhos  306<H>            Urinalysis Basic - ( 2023 05:58 )    Color: Colorless / Appearance: Clear / S.011 / pH: x  Gluc: x / Ketone: Negative  / Bili: Negative / Urobili: <2 mg/dL   Blood: x / Protein: 30 mg/dL / Nitrite: Negative   Leuk Esterase: Negative / RBC: 4 /HPF / WBC 3 /HPF   Sq Epi: x / Non Sq Epi: 3 /HPF / Bacteria: Negative        RADIOLOGY & ADDITIONAL TESTS:  < from: Transthoracic Echocardiogram (23 @ 09:01) >  . Normal mitral valve. Minimal mitral regurgitation.  2. Normal left ventricular internal dimensions and wall  thicknesses.  3. Endocardium not well visualized; grossly normal left  ventricular systolic function.  4. Mild diastolic dysfunction (Stage I).  5. Right ventricular enlargement with decreased right  ventricular systolic function.  6. Estimated right ventricular systolic pressure equals 55  mm Hg, assuming right atrial pressure equals 10 mm Hg,  consistent with moderate pulmonary hypertension.    < end of copied text >    Imaging Personally Reviewed:    Consultant(s) Notes Reviewed:      Care Discussed with Consultants/Other Providers:

## 2023-01-19 NOTE — PROGRESS NOTE ADULT - PROBLEM SELECTOR PLAN 3
Na 120->123 ->121  - Suspect multifactorial in setting of volume overload, SIADH  -on Bumex 1mg IV BID   - Is/Os  - Rpt BMP later today

## 2023-01-19 NOTE — PROGRESS NOTE ADULT - PROBLEM SELECTOR PLAN 5
Cr trending up to 2 today  - Renal following, ?cardiorenal syndrome   - C/w bumex as above  - Monitor BMP  - Avoid nephrotoxins   - Is/Os

## 2023-01-19 NOTE — CONSULT NOTE ADULT - SUBJECTIVE AND OBJECTIVE BOX
Cardiology Consult Note   [Please check [email or web address] password: [email or web address]" for cardiology service schedule and contact information]    History of Present Illness:   57F hypothyroidism (recent admissions for myxedema coma), HF? / moderate pHTN (TTE 11/22, unclear volume status at that time, normal LV/RV size and function), asthma, HTN, T2DM, hyperlipidemia, CKD,    PAST MEDICAL & SURGICAL HISTORY:  HTN (hypertension)      HLD (hyperlipidemia)      DM (diabetes mellitus)      Hypothyroid      H/O pulmonary hypertension      CKD (chronic kidney disease)      No significant past surgical history        FAMILY HISTORY:    SOCIAL HISTORY:  unchanged    MEDICATIONS:  buMETAnide Injectable 1 milliGRAM(s) IV Push every 12 hours  heparin   Injectable 5000 Unit(s) SubCutaneous every 8 hours    piperacillin/tazobactam IVPB.. 3.375 Gram(s) IV Intermittent every 8 hours  vancomycin  IVPB 1000 milliGRAM(s) IV Intermittent once    albuterol/ipratropium for Nebulization 3 milliLiter(s) Nebulizer every 6 hours  guaiFENesin Oral Liquid (Sugar-Free) 200 milliGRAM(s) Oral every 6 hours PRN    acetaminophen     Tablet .. 650 milliGRAM(s) Oral every 6 hours PRN  melatonin 3 milliGRAM(s) Oral at bedtime PRN  ondansetron Injectable 4 milliGRAM(s) IV Push every 8 hours PRN    aluminum hydroxide/magnesium hydroxide/simethicone Suspension 30 milliLiter(s) Oral every 4 hours PRN  pantoprazole  Injectable 40 milliGRAM(s) IV Push daily    atorvastatin 40 milliGRAM(s) Oral at bedtime  dextrose 50% Injectable 25 Gram(s) IV Push once  dextrose 50% Injectable 12.5 Gram(s) IV Push once  dextrose 50% Injectable 25 Gram(s) IV Push once  dextrose Oral Gel 15 Gram(s) Oral once PRN  glucagon  Injectable 1 milliGRAM(s) IntraMuscular once  insulin lispro (ADMELOG) corrective regimen sliding scale   SubCutaneous three times a day before meals  insulin lispro (ADMELOG) corrective regimen sliding scale   SubCutaneous at bedtime  levothyroxine Injectable 94 MICROGram(s) IV Push at bedtime    dextrose 5%. 1000 milliLiter(s) IV Continuous <Continuous>  dextrose 5%. 1000 milliLiter(s) IV Continuous <Continuous>  influenza   Vaccine 0.5 milliLiter(s) IntraMuscular once      REVIEW OF SYSTEMS:  CV: chest pain (-), palpitation (-), orthopnea (-), PND (-), edema (-)  PULM: SOB (-), cough (-), wheezing (-), hemoptysis (-).   CONST: fever (-), chills (-) or fatigue (-)  GI: abdominal distension (-), abdominal pain (-) , nausea/vomiting (-), hematemesis, (-), melena (-), hematochezia (-)  : dysuria (-), frequency (-), hematuria (-).   NEURO: numbness (-), weakness (-), dizziness (-)  SKIN: itching (-), rash (-)  HEENT:  visual changes (-); vertigo or throat pain (-);  neck stiffness (-)     All other review of systems is negative unless indicated above.   -------------------------------------------------------------------------------------------  PHYSICAL EXAM:  T(C): 36.1 (01-19-23 @ 07:15), Max: 36.7 (01-18-23 @ 11:19)  HR: 74 (01-19-23 @ 04:30) (72 - 88)  BP: 154/89 (01-19-23 @ 04:30) (142/76 - 180/80)  RR: 20 (01-19-23 @ 04:30) (17 - 22)  SpO2: 95% (01-19-23 @ 04:30) (95% - 100%)  Wt(kg): --  I&O's Summary      GENERAL: NAD  HEAD:  Atraumatic, Normocephalic.  EYES: EOMI, PERRLA, conjunctiva and sclera clear.  ENT: Moist mucous membranes.  NECK: Supple, No JVD.  CHEST/LUNG: Clear to auscultation bilaterally; No rales, rhonchi, wheezing, or rubs. Unlabored respirations.  HEART: Regular rate and rhythm; No murmurs, rubs, or gallops.  ABDOMEN: Bowel sounds present; Soft, Nontender, Nondistended.   EXTREMITIES:  2+ Peripheral Pulses, brisk capillary refill. No clubbing, cyanosis, or edema.  PSYCH: Normal affect.  SKIN: No rashes or lesions.    -------------------------------------------------------------------------------------------  LABS:                          8.0    15.32 )-----------( 102      ( 19 Jan 2023 05:02 )             23.4     01-19    121<L>  |  91<L>  |  29<H>  ----------------------------<  138<H>  5.1   |  18<L>  |  2.01<H>    Ca    8.8      19 Jan 2023 05:02  Phos  4.8     01-19  Mg     1.60     01-19    TPro  6.4  /  Alb  3.4  /  TBili  0.7  /  DBili  x   /  AST  38<H>  /  ALT  38<H>  /  AlkPhos  306<H>  01-19      CARDIAC MARKERS ( 17 Jan 2023 00:50 )  27 ng/L / x     / x     / x     / x     / x      CARDIAC MARKERS ( 16 Jan 2023 20:15 )  26 ng/L / x     / x     / x     / x     / x          piperacillin/tazobactam IVPB.. 3.375 Gram(s) IV Intermittent every 8 hours  vancomycin  IVPB 1000 milliGRAM(s) IV Intermittent once    albuterol/ipratropium for Nebulization 3 milliLiter(s) Nebulizer every 6 hours  guaiFENesin Oral Liquid (Sugar-Free) 200 milliGRAM(s) Oral every 6 hours PRN    acetaminophen     Tablet .. 650 milliGRAM(s) Oral every 6 hours PRN  melatonin 3 milliGRAM(s) Oral at bedtime PRN  ondansetron Injectable 4 milliGRAM(s) IV Push every 8 hours PRN    -------------------------------------------------------------------------------------------  Meds:  aluminum hydroxide/magnesium hydroxide/simethicone Suspension 30 milliLiter(s) Oral every 4 hours PRN  pantoprazole  Injectable 40 milliGRAM(s) IV Push daily    -------------------------------------------------------------------------------------------  Cardiovascular Diagnostic Testing:    ECG:     Echo:     Stress Testing:    Cath:    Imaging:    CXR:  reviewed  -------------------------------------------------------------------------------------------  Assessement and Plan:   1.  2.  3.  4.      Jw Trujillo MD  Attending Physician   Cardiology Inpatient Consult Service     [facility] [place]   [address], [address]  [place] [address]   Tel: [phone]  Fax: [phone]    Please check [email or web address] password: [email or web address]" for cardiology service schedule and contact information.          Cardiology Consult Note   [Please check [email or web address] password: [email or web address]" for cardiology service schedule and contact information]    History of Present Illness:   57F hypothyroidism (recent admissions for myxedema coma), moderate pHTN (TTE 11/22, unclear volume status at that time, normal LV/RV size and function), asthma, HTN, T2DM, hyperlipidemia, CKD who presents with SOB. Per patient, she has been having SOB on exertion for the several months which has been worsening. Now she has SOB at rest. She denies any weight gain or LE edema or orthopnea. She denies any chest pain at rest or exertion. Denies any palpitations. She has been diuresed with lasix/bumex with slight improvement with SOB. She also has been started on BiPAP intermittently.     PAST MEDICAL & SURGICAL HISTORY:  HTN (hypertension)      HLD (hyperlipidemia)      DM (diabetes mellitus)      Hypothyroid      H/O pulmonary hypertension      CKD (chronic kidney disease)      No significant past surgical history        FAMILY HISTORY:    SOCIAL HISTORY:  unchanged    MEDICATIONS:  buMETAnide Injectable 1 milliGRAM(s) IV Push every 12 hours  heparin   Injectable 5000 Unit(s) SubCutaneous every 8 hours    piperacillin/tazobactam IVPB.. 3.375 Gram(s) IV Intermittent every 8 hours  vancomycin  IVPB 1000 milliGRAM(s) IV Intermittent once    albuterol/ipratropium for Nebulization 3 milliLiter(s) Nebulizer every 6 hours  guaiFENesin Oral Liquid (Sugar-Free) 200 milliGRAM(s) Oral every 6 hours PRN    acetaminophen     Tablet .. 650 milliGRAM(s) Oral every 6 hours PRN  melatonin 3 milliGRAM(s) Oral at bedtime PRN  ondansetron Injectable 4 milliGRAM(s) IV Push every 8 hours PRN    aluminum hydroxide/magnesium hydroxide/simethicone Suspension 30 milliLiter(s) Oral every 4 hours PRN  pantoprazole  Injectable 40 milliGRAM(s) IV Push daily    atorvastatin 40 milliGRAM(s) Oral at bedtime  dextrose 50% Injectable 25 Gram(s) IV Push once  dextrose 50% Injectable 12.5 Gram(s) IV Push once  dextrose 50% Injectable 25 Gram(s) IV Push once  dextrose Oral Gel 15 Gram(s) Oral once PRN  glucagon  Injectable 1 milliGRAM(s) IntraMuscular once  insulin lispro (ADMELOG) corrective regimen sliding scale   SubCutaneous three times a day before meals  insulin lispro (ADMELOG) corrective regimen sliding scale   SubCutaneous at bedtime  levothyroxine Injectable 94 MICROGram(s) IV Push at bedtime    dextrose 5%. 1000 milliLiter(s) IV Continuous <Continuous>  dextrose 5%. 1000 milliLiter(s) IV Continuous <Continuous>  influenza   Vaccine 0.5 milliLiter(s) IntraMuscular once      REVIEW OF SYSTEMS:  CV: chest pain (-), palpitation (-), orthopnea (-), PND (-), edema (-)  PULM: SOB (+), cough (-), wheezing (-), hemoptysis (-).   CONST: fever (-), chills (-) or fatigue (-)  GI: abdominal distension (-), abdominal pain (-) , nausea/vomiting (-), hematemesis, (-), melena (-), hematochezia (-)  : dysuria (-), frequency (-), hematuria (-).   NEURO: numbness (-), weakness (-), dizziness (-)  SKIN: itching (-), rash (-)  HEENT:  visual changes (-); vertigo or throat pain (-);  neck stiffness (-)     All other review of systems is negative unless indicated above.   -------------------------------------------------------------------------------------------  PHYSICAL EXAM:  T(C): 36.1 (01-19-23 @ 07:15), Max: 36.7 (01-18-23 @ 11:19)  HR: 74 (01-19-23 @ 04:30) (72 - 88)  BP: 154/89 (01-19-23 @ 04:30) (142/76 - 180/80)  RR: 20 (01-19-23 @ 04:30) (17 - 22)  SpO2: 95% (01-19-23 @ 04:30) (95% - 100%)  Wt(kg): --  I&O's Summary      GENERAL: Mild distress, on NC   HEAD:  Atraumatic, Normocephalic.  NECK: Supple, No JVD.  CHEST/LUNG: Clear to auscultation bilaterally; No rales, rhonchi, wheezing, or rubs. Unlabored respirations.  HEART: Regular rate and rhythm; No murmurs, rubs, or gallops.  EXTREMITIES: Trace LE edema   PSYCH: Normal affect.  SKIN: No rashes or lesions.    -------------------------------------------------------------------------------------------  LABS:                          8.0    15.32 )-----------( 102      ( 19 Jan 2023 05:02 )             23.4     01-19    121<L>  |  91<L>  |  29<H>  ----------------------------<  138<H>  5.1   |  18<L>  |  2.01<H>    Ca    8.8      19 Jan 2023 05:02  Phos  4.8     01-19  Mg     1.60     01-19    TPro  6.4  /  Alb  3.4  /  TBili  0.7  /  DBili  x   /  AST  38<H>  /  ALT  38<H>  /  AlkPhos  306<H>  01-19      CARDIAC MARKERS ( 17 Jan 2023 00:50 )  27 ng/L / x     / x     / x     / x     / x      CARDIAC MARKERS ( 16 Jan 2023 20:15 )  26 ng/L / x     / x     / x     / x     / x          piperacillin/tazobactam IVPB.. 3.375 Gram(s) IV Intermittent every 8 hours  vancomycin  IVPB 1000 milliGRAM(s) IV Intermittent once    albuterol/ipratropium for Nebulization 3 milliLiter(s) Nebulizer every 6 hours  guaiFENesin Oral Liquid (Sugar-Free) 200 milliGRAM(s) Oral every 6 hours PRN    acetaminophen     Tablet .. 650 milliGRAM(s) Oral every 6 hours PRN  melatonin 3 milliGRAM(s) Oral at bedtime PRN  ondansetron Injectable 4 milliGRAM(s) IV Push every 8 hours PRN    -------------------------------------------------------------------------------------------  Meds:  aluminum hydroxide/magnesium hydroxide/simethicone Suspension 30 milliLiter(s) Oral every 4 hours PRN  pantoprazole  Injectable 40 milliGRAM(s) IV Push daily    -------------------------------------------------------------------------------------------  Cardiovascular Diagnostic Testing:    ECG:   Sinus rhythm 1st degree AV block     Echo:   CONCLUSIONS:  1. Normal mitral valve. Minimal mitral regurgitation.  2. Normal left ventricular internal dimensions and wall  thicknesses.  3. Endocardium not well visualized; grossly normal left  ventricular systolic function.  4. Mild diastolic dysfunction (Stage I).  5. Right ventricular enlargement with decreased right  ventricular systolic function.  6. Estimated right ventricular systolic pressure equals 55  mm Hg, assuming right atrial pressure equals 10 mm Hg,  consistent with moderate pulmonary hypertension.    Stress Testing:    Cath:    Imaging:    CXR:  reviewed  -------------------------------------------------------------------------------------------  Assessement and Plan:   57F hypothyroidism (recent admissions for myxedema coma), moderate pHTN (TTE 11/22, unclear volume status at that time, normal LV/RV size and function), asthma, HTN, T2DM, hyperlipidemia, CKD who presents with SOB found to have AHRF and hyponatremia with worsening ISAMAR.     #AHRF   #Dyspnea   #Pulm HTN   Patient with moderate pulm HTN on echo and now decreased RV function. Has hx of pulm HTN moderate-severe in past of unclear etiology. CT chest showed bilateral pleural effusion and pulm edema with elevated BNP but clinically does not seem to be volume overloaded.   -Can continue bumex IV   -Likely patient should go for [facility] for further evaluation of her etiology of her pulm HTN     #HTN  -Per primary team, holding given possible PNA/sepsis         Please check [email or web address] password: "[email or web address]" for cardiology service schedule and contact information.

## 2023-01-19 NOTE — DIETITIAN INITIAL EVALUATION ADULT - OTHER CALCULATIONS
Dosing height: 5'0" / 152.4 cm. No dosing weight available at this time. Bed scale not functioning at time of visit.  Ideal Body Weight: 100 lbs / 45.5 kg +/-10%   Recent chart weight (12/3) 130 lbs.

## 2023-01-19 NOTE — DIETITIAN INITIAL EVALUATION ADULT - PERTINENT MEDS FT
atorvastatin, ADMELOG corrective regimen sliding scale, levothyroxine IV, pantoprazole IV, piperacillin/tazobactam IV, aluminum hydroxide/magnesium hydroxide/simethicone Suspension PRN, ondansetron IV PRN

## 2023-01-19 NOTE — PROGRESS NOTE ADULT - SUBJECTIVE AND OBJECTIVE BOX
[facility] DIVISION OF KIDNEY DISEASES AND HYPERTENSION -- FOLLOW UP NOTE  --------------------------------------------------------------------------------  HPI: 58 y/o female with Hx of CKD, Hypothyroidism, HTN, DM, PAH, asthma, and HLD who is admitted at [facility] on 1/18 for SOB. Of ntoe, the pt was recently admitted at [facility] for myxedema coma, delirium, and multiorgan failure in which she was intubated at the time and received vasopressors and broad spectrum antibiotics. In the ED, pt was placed on BIPAP for respiratory distress and received IV lasix mg once. CT chest done in ER with moderate pleural effusions. Pt later received IV Bumex 1 mg BID. Nephrology consulted for hyponatremia and ISAMAR    24 hour events/subjective:  Pt. seen and examined at bedside. Pt. reports improvement in her breathing, currently on NC. Denies any SOB, CP, HA, N/V, abdominal pain , urinary symptoms or dizziness. Scr is elevated at 2.01 today.       PAST HISTORY  --------------------------------------------------------------------------------  No significant changes to PMH, PSH, FHx, SHx, unless otherwise noted    ALLERGIES & MEDICATIONS  --------------------------------------------------------------------------------  Allergies    No Known Allergies    Intolerances      Standing Inpatient Medications  albuterol/ipratropium for Nebulization 3 milliLiter(s) Nebulizer every 6 hours  atorvastatin 40 milliGRAM(s) Oral at bedtime  buMETAnide Injectable 1 milliGRAM(s) IV Push every 12 hours  dextrose 5%. 1000 milliLiter(s) IV Continuous <Continuous>  dextrose 5%. 1000 milliLiter(s) IV Continuous <Continuous>  dextrose 50% Injectable 25 Gram(s) IV Push once  dextrose 50% Injectable 12.5 Gram(s) IV Push once  dextrose 50% Injectable 25 Gram(s) IV Push once  glucagon  Injectable 1 milliGRAM(s) IntraMuscular once  heparin   Injectable 5000 Unit(s) SubCutaneous every 8 hours  influenza   Vaccine 0.5 milliLiter(s) IntraMuscular once  insulin lispro (ADMELOG) corrective regimen sliding scale   SubCutaneous three times a day before meals  insulin lispro (ADMELOG) corrective regimen sliding scale   SubCutaneous at bedtime  levothyroxine Injectable 94 MICROGram(s) IV Push at bedtime  pantoprazole  Injectable 40 milliGRAM(s) IV Push daily  piperacillin/tazobactam IVPB.. 3.375 Gram(s) IV Intermittent every 8 hours    PRN Inpatient Medications  acetaminophen     Tablet .. 650 milliGRAM(s) Oral every 6 hours PRN  aluminum hydroxide/magnesium hydroxide/simethicone Suspension 30 milliLiter(s) Oral every 4 hours PRN  dextrose Oral Gel 15 Gram(s) Oral once PRN  guaiFENesin Oral Liquid (Sugar-Free) 200 milliGRAM(s) Oral every 6 hours PRN  melatonin 3 milliGRAM(s) Oral at bedtime PRN  ondansetron Injectable 4 milliGRAM(s) IV Push every 8 hours PRN      REVIEW OF SYSTEMS  --------------------------------------------------------------------------------  Gen: No fevers/chills  Skin: No rashes  Head/Eyes/Ears: Normal hearing,   Respiratory: No dyspnea, cough  CV: No chest pain  GI: No abdominal pain, diarrhea  : No dysuria, hematuria  MSK: No  edema  Heme: No easy bruising or bleeding  Psych: No significant depression      All other systems were reviewed and are negative, except as noted.    VITALS/PHYSICAL EXAM  --------------------------------------------------------------------------------  T(C): 36.2 (01-19-23 @ 12:23), Max: 36.7 (01-18-23 @ 22:37)  HR: 71 (01-19-23 @ 12:23) (71 - 88)  BP: 150/84 (01-19-23 @ 12:23) (142/76 - 180/80)  RR: 19 (01-19-23 @ 12:23) (19 - 22)  SpO2: 100% (01-19-23 @ 12:23) (94% - 100%)  Wt(kg): --    Physical Exam:  	Gen: resting  	HEENT: MMM, on NC  	Pulm: Poor AE at bases  	CV: S1S2  	Abd: Soft, +BS   	Ext: B/L LE edema +  	Neuro: Awake  	Skin: Warm and dry  	Vascular access: IV peripheral canula    LABS/STUDIES  --------------------------------------------------------------------------------              8.0    15.32 >-----------<  102      [01-19-23 @ 05:02]              23.4     121  |  91  |  29  ----------------------------<  138      [01-19-23 @ 05:02]  5.1   |  18  |  2.01        Ca     8.8     [01-19-23 @ 05:02]      Mg     1.60     [01-19-23 @ 05:02]      Phos  4.8     [01-19-23 @ 05:02]    TPro  6.4  /  Alb  3.4  /  TBili  0.7  /  DBili  x   /  AST  38  /  ALT  38  /  AlkPhos  306  [01-19-23 @ 05:02]    Uric acid 6.3      [01-19-23 @ 05:02]        [01-19-23 @ 05:02]    Creatinine Trend:  SCr 2.01 [01-19 @ 05:02]  SCr 1.56 [01-18 @ 06:00]  SCr 1.52 [01-18 @ 03:50]  SCr 1.35 [01-17 @ 17:50]  SCr 1.17 [01-17 @ 07:10]    Urinalysis - [01-18-23 @ 05:58]      Color Colorless / Appearance Clear / SG 1.011 / pH 6.0      Gluc Trace / Ketone Negative  / Bili Negative / Urobili <2 mg/dL       Blood Trace / Protein 30 mg/dL / Leuk Est Negative / Nitrite Negative      RBC 4 / WBC 3 / Hyaline 1 / Gran  / Sq Epi  / Non Sq Epi 3 / Bacteria Negative    Urine Creatinine 23      [01-19-23 @ 10:28]  Urine Protein 116      [01-19-23 @ 10:28]  Urine Sodium 47      [01-18-23 @ 05:58]  Urine Urea Nitrogen 114.0      [01-18-23 @ 05:58]  Urine Potassium 14.2      [01-18-23 @ 05:58]  Urine Chloride 45      [01-17-23 @ 09:00]  Urine Phosphorus 33.9      [01-17-23 @ 09:00]  Urine Osmolality 194      [01-18-23 @ 05:58]    Iron 42, TIBC 306, %sat 14      [01-17-23 @ 07:10]  Ferritin 729      [01-17-23 @ 07:10]  TSH 2.51      [01-18-23 @ 03:50]  Lipid: chol 107, TG 28, HDL 61, LDL --      [11-25-22 @ 21:00]    HIV Nonreact      [01-19-23 @ 05:02]    C3 Complement 128      [01-19-23 @ 05:02]  C4 Complement 46      [01-19-23 @ 05:02]  Free Light Chains: kappa 8.92, lambda 3.79, ratio = 2.35      [01-19 @ 05:02]  SPEP Interpretation: Increased Beta fraction, possibly transferrin increase. HUMPHREY Mccrary MD      [01-19-23 @ 05:02]

## 2023-01-19 NOTE — PROGRESS NOTE ADULT - ASSESSMENT
FOLLOW-UP PATIENT VISIT     {Med/Latex Allergy:229788}   Tobacco history verified.  Medical assistant/nurse notes reviewed and accepted.     Verbal permission granted by patient to leave a detailed message with medical information at phone number listed in Epic. {yes no:[id number]}    If female, are you pregnant, trying to become pregnant, or breastfeeding? {MBPREGNYESNONA:795261}    [id number]}  Duration:  *** {DURATION:118622}  Symptoms:  {MB GROWTH SX:910437}  Changes over time:  {CHANGES:0848786}  57F hypothyroidism (recent admissions for myxedema coma), HF? / moderate pHTN (TTE , unclear volume status at that time, normal LV/RV size and function), asthma, HTN, T2DM, hyperlipidemia, CKD, presented to the ED with shortness of breath found to have acute hypoxemic resp failure. Pulm consulted for further management.    Assessment: Respiratory failure likely multifactorial in setting of volume overload and possible PNA. Patient has moderate bilateral pleural effusions on imaging with dense consolidations bilaterally. She has lower extremity edema. Pro BNP 1600. She was hypothermic in ED. She had increased WOB on  and was started on bilevel. She has received diuretics and she appears improved. Started on broad spectrum abx. Unclear clinical picture, may have serositis given pleural and pericardial effusions. No known autoimmune or rheum disease. No findings on exam such as rash or abnormal hand findings.     Ultrasound  shows smaller bilateral effusions. Patient reports symptomatic improvement.     Plan:  C/w diuresis  C/w abx  Duonebs q6 standing   Check sputum culture if able    Pulm will follow    This patient will need to close hospital follow up after discharge with the pulmonary team:    Please email: [email or web address] to setup an a close hospital follow up appointment for the patient  prior to discharge with any available pulmonologist. The appointment should be within 1 week of discharge from the hospital. Include the patient's name, , MRN and contact information in the email.      [facility]  [address] [place] [address]  [phone]    Please discuss the appointment details with the patient and include the appointment details in the patients discharge summary.

## 2023-01-19 NOTE — PHYSICAL THERAPY INITIAL EVALUATION ADULT - ADDITIONAL COMMENTS
Patient lives with  and children in private home, 5 steps to enter and 9 steps to bedroom/bathroom. patient was independent in all ADL prior to admission. Patient was not using an assistive device prior to admission. Patient reports she has grab bars in bathroom.

## 2023-01-20 DIAGNOSIS — R19.5 OTHER FECAL ABNORMALITIES: ICD-10-CM

## 2023-01-20 DIAGNOSIS — E87.5 HYPERKALEMIA: ICD-10-CM

## 2023-01-20 LAB
ALBUMIN SERPL ELPH-MCNC: 3.2 G/DL — LOW (ref 3.3–5)
ALP SERPL-CCNC: 405 U/L — HIGH (ref 40–120)
ALT FLD-CCNC: 75 U/L — HIGH (ref 4–33)
ANION GAP SERPL CALC-SCNC: 14 MMOL/L — SIGNIFICANT CHANGE UP (ref 7–14)
APTT BLD: 51.9 SEC — HIGH (ref 27–36.3)
AST SERPL-CCNC: 82 U/L — HIGH (ref 4–32)
BILIRUB SERPL-MCNC: 1.1 MG/DL — SIGNIFICANT CHANGE UP (ref 0.2–1.2)
BLD GP AB SCN SERPL QL: NEGATIVE — SIGNIFICANT CHANGE UP
BUN SERPL-MCNC: 34 MG/DL — HIGH (ref 7–23)
BUN SERPL-MCNC: 36 MG/DL — HIGH (ref 7–23)
BUN SERPL-MCNC: 37 MG/DL — HIGH (ref 7–23)
C DIFF BY PCR RESULT: SIGNIFICANT CHANGE UP
CALCIUM SERPL-MCNC: 8.6 MG/DL — SIGNIFICANT CHANGE UP (ref 8.4–10.5)
CHLORIDE SERPL-SCNC: 87 MMOL/L — LOW (ref 98–107)
CLOSURE TME COLL+EPINEP BLD: 53 K/UL — LOW (ref 150–400)
CO2 SERPL-SCNC: 17 MMOL/L — LOW (ref 22–31)
CO2 SERPL-SCNC: 19 MMOL/L — LOW (ref 22–31)
CO2 SERPL-SCNC: 19 MMOL/L — LOW (ref 22–31)
CREAT SERPL-MCNC: 2.53 MG/DL — HIGH (ref 0.5–1.3)
CREAT SERPL-MCNC: 2.67 MG/DL — HIGH (ref 0.5–1.3)
CREAT SERPL-MCNC: 2.77 MG/DL — HIGH (ref 0.5–1.3)
D DIMER BLD IA.RAPID-MCNC: 643 NG/ML DDU — HIGH
DSDNA AB SER-ACNC: <12 IU/ML — SIGNIFICANT CHANGE UP
EGFR: 19 ML/MIN/1.73M2 — LOW
EGFR: 20 ML/MIN/1.73M2 — LOW
EGFR: 22 ML/MIN/1.73M2 — LOW
FIBRINOGEN PPP-MCNC: 585 MG/DL — HIGH (ref 200–465)
FOLATE SERPL-MCNC: 15.2 NG/ML — SIGNIFICANT CHANGE UP (ref 3.1–17.5)
GBM IGG SER-ACNC: <0.2 — SIGNIFICANT CHANGE UP (ref 0–0.9)
GI PCR PANEL: SIGNIFICANT CHANGE UP
GIANT PLATELETS BLD QL SMEAR: PRESENT — SIGNIFICANT CHANGE UP
GLUCOSE BLDC GLUCOMTR-MCNC: 137 MG/DL — HIGH (ref 70–99)
GLUCOSE BLDC GLUCOMTR-MCNC: 154 MG/DL — HIGH (ref 70–99)
GLUCOSE BLDC GLUCOMTR-MCNC: 174 MG/DL — HIGH (ref 70–99)
GLUCOSE BLDC GLUCOMTR-MCNC: 191 MG/DL — HIGH (ref 70–99)
GLUCOSE BLDC GLUCOMTR-MCNC: 209 MG/DL — HIGH (ref 70–99)
GLUCOSE BLDC GLUCOMTR-MCNC: 212 MG/DL — HIGH (ref 70–99)
GLUCOSE SERPL-MCNC: 140 MG/DL — HIGH (ref 70–99)
GLUCOSE SERPL-MCNC: 159 MG/DL — HIGH (ref 70–99)
GLUCOSE SERPL-MCNC: 205 MG/DL — HIGH (ref 70–99)
HCT VFR BLD CALC: 21.2 % — LOW (ref 34.5–45)
HCT VFR BLD CALC: 21.4 % — LOW (ref 34.5–45)
HCT VFR BLD CALC: 22.1 % — LOW (ref 34.5–45)
HGB BLD-MCNC: 7.1 G/DL — LOW (ref 11.5–15.5)
HGB BLD-MCNC: 7.2 G/DL — LOW (ref 11.5–15.5)
HGB BLD-MCNC: 7.4 G/DL — LOW (ref 11.5–15.5)
HIV 1+2 AB+HIV1 P24 AG SERPL QL IA: SIGNIFICANT CHANGE UP
INR BLD: 1.26 RATIO — HIGH (ref 0.88–1.16)
LACTATE SERPL-SCNC: 1.4 MMOL/L — SIGNIFICANT CHANGE UP (ref 0.5–2)
LEGIONELLA AG UR QL: NEGATIVE — SIGNIFICANT CHANGE UP
MAGNESIUM SERPL-MCNC: 1.7 MG/DL — SIGNIFICANT CHANGE UP (ref 1.6–2.6)
MAGNESIUM SERPL-MCNC: 2.1 MG/DL — SIGNIFICANT CHANGE UP (ref 1.6–2.6)
MAGNESIUM SERPL-MCNC: 2.2 MG/DL — SIGNIFICANT CHANGE UP (ref 1.6–2.6)
MANUAL SMEAR VERIFICATION: SIGNIFICANT CHANGE UP
MCHC RBC-ENTMCNC: 26.1 PG — LOW (ref 27–34)
MCHC RBC-ENTMCNC: 26.1 PG — LOW (ref 27–34)
MCHC RBC-ENTMCNC: 26.8 PG — LOW (ref 27–34)
MCHC RBC-ENTMCNC: 33.2 GM/DL — SIGNIFICANT CHANGE UP (ref 32–36)
MCHC RBC-ENTMCNC: 33.5 GM/DL — SIGNIFICANT CHANGE UP (ref 32–36)
MCHC RBC-ENTMCNC: 34 GM/DL — SIGNIFICANT CHANGE UP (ref 32–36)
MCV RBC AUTO: 78.1 FL — LOW (ref 80–100)
MCV RBC AUTO: 78.7 FL — LOW (ref 80–100)
MCV RBC AUTO: 78.8 FL — LOW (ref 80–100)
MPO AB + PR3 PNL SER: SIGNIFICANT CHANGE UP
NRBC # BLD: 0 /100 WBCS — SIGNIFICANT CHANGE UP (ref 0–0)
NRBC # FLD: 0.02 K/UL — HIGH (ref 0–0)
NRBC # FLD: 0.03 K/UL — HIGH (ref 0–0)
NRBC # FLD: 0.03 K/UL — HIGH (ref 0–0)
PHOSPHATE SERPL-MCNC: 4.6 MG/DL — HIGH (ref 2.5–4.5)
PHOSPHATE SERPL-MCNC: 4.6 MG/DL — HIGH (ref 2.5–4.5)
PHOSPHATE SERPL-MCNC: 4.7 MG/DL — HIGH (ref 2.5–4.5)
PLAT MORPH BLD: ABNORMAL
PLATELET # BLD AUTO: 56 K/UL — LOW (ref 150–400)
PLATELET # BLD AUTO: 61 K/UL — LOW (ref 150–400)
PLATELET # BLD AUTO: 69 K/UL — LOW (ref 150–400)
PLATELET COUNT - ESTIMATE: ABNORMAL
POTASSIUM SERPL-MCNC: 5 MMOL/L — SIGNIFICANT CHANGE UP (ref 3.5–5.3)
POTASSIUM SERPL-MCNC: 5.5 MMOL/L — HIGH (ref 3.5–5.3)
POTASSIUM SERPL-MCNC: 5.8 MMOL/L — HIGH (ref 3.5–5.3)
POTASSIUM SERPL-SCNC: 5 MMOL/L — SIGNIFICANT CHANGE UP (ref 3.5–5.3)
POTASSIUM SERPL-SCNC: 5.5 MMOL/L — HIGH (ref 3.5–5.3)
POTASSIUM SERPL-SCNC: 5.8 MMOL/L — HIGH (ref 3.5–5.3)
PROT SERPL-MCNC: 6.4 G/DL — SIGNIFICANT CHANGE UP (ref 6–8.3)
PROTHROM AB SERPL-ACNC: 14.7 SEC — HIGH (ref 10.5–13.4)
RBC # BLD: 2.69 M/UL — LOW (ref 3.8–5.2)
RBC # BLD: 2.72 M/UL — LOW (ref 3.8–5.2)
RBC # BLD: 2.83 M/UL — LOW (ref 3.8–5.2)
RBC # FLD: 15.1 % — HIGH (ref 10.3–14.5)
RBC # FLD: 15.2 % — HIGH (ref 10.3–14.5)
RBC # FLD: 15.3 % — HIGH (ref 10.3–14.5)
RBC BLD AUTO: SIGNIFICANT CHANGE UP
RH IG SCN BLD-IMP: POSITIVE — SIGNIFICANT CHANGE UP
RHEUMATOID FACT SERPL-ACNC: 10 IU/ML — SIGNIFICANT CHANGE UP (ref 0–13)
SODIUM SERPL-SCNC: 118 MMOL/L — CRITICAL LOW (ref 135–145)
SODIUM SERPL-SCNC: 120 MMOL/L — CRITICAL LOW (ref 135–145)
SODIUM SERPL-SCNC: 120 MMOL/L — CRITICAL LOW (ref 135–145)
T4 FREE SERPL-MCNC: 1.6 NG/DL — SIGNIFICANT CHANGE UP (ref 0.9–1.8)
VANCOMYCIN FLD-MCNC: 18.9 UG/ML — SIGNIFICANT CHANGE UP
VIT B12 SERPL-MCNC: 2000 PG/ML — HIGH (ref 200–900)
WBC # BLD: 10.81 K/UL — HIGH (ref 3.8–10.5)
WBC # BLD: 8.81 K/UL — SIGNIFICANT CHANGE UP (ref 3.8–10.5)
WBC # BLD: 8.86 K/UL — SIGNIFICANT CHANGE UP (ref 3.8–10.5)
WBC # FLD AUTO: 10.81 K/UL — HIGH (ref 3.8–10.5)
WBC # FLD AUTO: 8.81 K/UL — SIGNIFICANT CHANGE UP (ref 3.8–10.5)
WBC # FLD AUTO: 8.86 K/UL — SIGNIFICANT CHANGE UP (ref 3.8–10.5)

## 2023-01-20 PROCEDURE — 99233 SBSQ HOSP IP/OBS HIGH 50: CPT

## 2023-01-20 PROCEDURE — 99233 SBSQ HOSP IP/OBS HIGH 50: CPT | Mod: 25

## 2023-01-20 PROCEDURE — 99233 SBSQ HOSP IP/OBS HIGH 50: CPT | Mod: GC

## 2023-01-20 PROCEDURE — 99223 1ST HOSP IP/OBS HIGH 75: CPT

## 2023-01-20 PROCEDURE — 71045 X-RAY EXAM CHEST 1 VIEW: CPT | Mod: 26

## 2023-01-20 PROCEDURE — 99232 SBSQ HOSP IP/OBS MODERATE 35: CPT

## 2023-01-20 RX ORDER — INSULIN HUMAN 100 [IU]/ML
10 INJECTION, SOLUTION SUBCUTANEOUS ONCE
Refills: 0 | Status: DISCONTINUED | OUTPATIENT
Start: 2023-01-20 | End: 2023-01-20

## 2023-01-20 RX ORDER — MAGNESIUM SULFATE 500 MG/ML
2 VIAL (ML) INJECTION ONCE
Refills: 0 | Status: COMPLETED | OUTPATIENT
Start: 2023-01-20 | End: 2023-01-20

## 2023-01-20 RX ORDER — SODIUM ZIRCONIUM CYCLOSILICATE 10 G/10G
10 POWDER, FOR SUSPENSION ORAL ONCE
Refills: 0 | Status: COMPLETED | OUTPATIENT
Start: 2023-01-20 | End: 2023-01-20

## 2023-01-20 RX ORDER — INSULIN HUMAN 100 [IU]/ML
5 INJECTION, SOLUTION SUBCUTANEOUS ONCE
Refills: 0 | Status: COMPLETED | OUTPATIENT
Start: 2023-01-20 | End: 2023-01-20

## 2023-01-20 RX ORDER — DEXTROSE 50 % IN WATER 50 %
50 SYRINGE (ML) INTRAVENOUS ONCE
Refills: 0 | Status: COMPLETED | OUTPATIENT
Start: 2023-01-20 | End: 2023-01-20

## 2023-01-20 RX ORDER — PIPERACILLIN AND TAZOBACTAM 4; .5 G/20ML; G/20ML
3.38 INJECTION, POWDER, LYOPHILIZED, FOR SOLUTION INTRAVENOUS EVERY 12 HOURS
Refills: 0 | Status: COMPLETED | OUTPATIENT
Start: 2023-01-20 | End: 2023-01-28

## 2023-01-20 RX ORDER — SODIUM CHLORIDE 5 G/100ML
500 INJECTION, SOLUTION INTRAVENOUS
Refills: 0 | Status: DISCONTINUED | OUTPATIENT
Start: 2023-01-20 | End: 2023-01-31

## 2023-01-20 RX ORDER — MUPIROCIN 20 MG/G
1 OINTMENT TOPICAL THREE TIMES A DAY
Refills: 0 | Status: DISCONTINUED | OUTPATIENT
Start: 2023-01-20 | End: 2023-01-31

## 2023-01-20 RX ORDER — BUMETANIDE 0.25 MG/ML
2 INJECTION INTRAMUSCULAR; INTRAVENOUS EVERY 12 HOURS
Refills: 0 | Status: DISCONTINUED | OUTPATIENT
Start: 2023-01-20 | End: 2023-01-21

## 2023-01-20 RX ADMIN — Medication 3 MILLILITER(S): at 21:17

## 2023-01-20 RX ADMIN — Medication 3 MILLILITER(S): at 03:30

## 2023-01-20 RX ADMIN — Medication 94 MICROGRAM(S): at 21:10

## 2023-01-20 RX ADMIN — BUMETANIDE 2 MILLIGRAM(S): 0.25 INJECTION INTRAMUSCULAR; INTRAVENOUS at 18:03

## 2023-01-20 RX ADMIN — Medication 50 MILLILITER(S): at 22:30

## 2023-01-20 RX ADMIN — SODIUM ZIRCONIUM CYCLOSILICATE 10 GRAM(S): 10 POWDER, FOR SUSPENSION ORAL at 21:11

## 2023-01-20 RX ADMIN — Medication 1: at 17:46

## 2023-01-20 RX ADMIN — SODIUM ZIRCONIUM CYCLOSILICATE 10 GRAM(S): 10 POWDER, FOR SUSPENSION ORAL at 08:04

## 2023-01-20 RX ADMIN — Medication 2 UNIT(S): at 12:24

## 2023-01-20 RX ADMIN — HEPARIN SODIUM 5000 UNIT(S): 5000 INJECTION INTRAVENOUS; SUBCUTANEOUS at 14:11

## 2023-01-20 RX ADMIN — Medication 25 GRAM(S): at 08:04

## 2023-01-20 RX ADMIN — INSULIN GLARGINE 2 UNIT(S): 100 INJECTION, SOLUTION SUBCUTANEOUS at 23:22

## 2023-01-20 RX ADMIN — MUPIROCIN 1 APPLICATION(S): 20 OINTMENT TOPICAL at 22:32

## 2023-01-20 RX ADMIN — Medication 94 MICROGRAM(S): at 01:14

## 2023-01-20 RX ADMIN — PANTOPRAZOLE SODIUM 40 MILLIGRAM(S): 20 TABLET, DELAYED RELEASE ORAL at 11:52

## 2023-01-20 RX ADMIN — PIPERACILLIN AND TAZOBACTAM 25 GRAM(S): 4; .5 INJECTION, POWDER, LYOPHILIZED, FOR SOLUTION INTRAVENOUS at 05:15

## 2023-01-20 RX ADMIN — HEPARIN SODIUM 5000 UNIT(S): 5000 INJECTION INTRAVENOUS; SUBCUTANEOUS at 05:15

## 2023-01-20 RX ADMIN — BUMETANIDE 1 MILLIGRAM(S): 0.25 INJECTION INTRAMUSCULAR; INTRAVENOUS at 05:15

## 2023-01-20 RX ADMIN — PIPERACILLIN AND TAZOBACTAM 25 GRAM(S): 4; .5 INJECTION, POWDER, LYOPHILIZED, FOR SOLUTION INTRAVENOUS at 17:47

## 2023-01-20 RX ADMIN — HEPARIN SODIUM 5000 UNIT(S): 5000 INJECTION INTRAVENOUS; SUBCUTANEOUS at 21:10

## 2023-01-20 RX ADMIN — Medication 2 UNIT(S): at 09:11

## 2023-01-20 RX ADMIN — Medication 1: at 12:25

## 2023-01-20 RX ADMIN — Medication 2 UNIT(S): at 17:47

## 2023-01-20 RX ADMIN — INSULIN HUMAN 5 UNIT(S): 100 INJECTION, SOLUTION SUBCUTANEOUS at 22:30

## 2023-01-20 RX ADMIN — SODIUM CHLORIDE 30 MILLILITER(S): 5 INJECTION, SOLUTION INTRAVENOUS at 23:22

## 2023-01-20 RX ADMIN — Medication 3 MILLILITER(S): at 10:03

## 2023-01-20 NOTE — PROGRESS NOTE ADULT - PROBLEM SELECTOR PLAN 5
Cr up trending up to 2.5 today  - Renal following, ?cardiorenal syndrome   - C/w bumex as above  - Monitor BMP  - Avoid nephrotoxins   - Is/Os  - Discussed with Nephrology attending shannan hendrickson however per pulm no role for emeka cont diuresis

## 2023-01-20 NOTE — PROGRESS NOTE ADULT - PROBLEM SELECTOR PLAN 1
Pt with ISAMAR on CKD likely in the setting of fluid overload and IV contrast use. Scr was 1.17 on 1/7/23. CT scan with IV contrast was done on 1/17. Scr was elevated at 1.52 on 1/18. Scr subsequently is elevated at 2.53  today (1/20/23). Spot urine TP/CR is elevated at 5.9 (repeat at 5.1), UA showed trace proteins with 4 RBCs. Serological work was done in view of nephrotic range proteinuria. Serum C3 is not low (128), C4 is not low (46), HIV (NR), Hep Bs Ag (negative), Hep C Ab (negative), Kappa / lambda ratio within range for CKD. Awaiting STEVO, serum VEE and ANCA levels. Labs reviewed. Pt clinically in fluid overload. Recommend to increase IV Bumex to 2 mg BID for now. Follow up with Pulmonology regarding pleural effusion. Cardiology note from 1/19/22 reviewed. Monitor labs and urine output. Avoid any potential nephrotoxins. Dose medications as per eGFR.

## 2023-01-20 NOTE — PROGRESS NOTE ADULT - PROBLEM SELECTOR PLAN 4
Na 120->123 ->121->120  - Suspect multifactorial in setting of volume overload, SIADH  -on Bumex 1mg IV BID   - Is/Os  - Rpt BMP later today

## 2023-01-20 NOTE — CHART NOTE - NSCHARTNOTEFT_GEN_A_CORE
Notified by RN overnight that patient is hypothermic with temp 90.4F. Patient seen and assessed at bedside. Currently in NAD. Patient denies feeling cold.     Vital Signs Last 24 Hrs  T(C): 32.4 (20 Jan 2023 20:32), Max: 36.9 (20 Jan 2023 18:00)  T(F): 90.4 (20 Jan 2023 20:32), Max: 98.4 (20 Jan 2023 18:00)  HR: 57 (20 Jan 2023 20:32) (52 - 75)  BP: 140/69 (20 Jan 2023 20:32) (139/70 - 151/74)  RR: 18 (20 Jan 2023 20:32) (18 - 20)  SpO2: 97% (20 Jan 2023 20:32) (85% - 100%)    PHYSICAL EXAM:  General: Awake and alert.  No acute distress.  Head: Normocephalic, atraumatic.    Eyes: PERRL.  EOMI.  No scleral icterus.  No conjunctival pallor.  Mouth: Moist MM.  No oropharyngeal exudates.    Neck: Supple.    Heart: RRR.  Normal S1 and S2.  No murmurs, rubs, or gallops.  No LE edema b/l.   Lungs: Nonlabored breathing.  Good inspiratory effort.  CTAB.  No wheezes, crackles, or rhonchi.    Abdomen: BS+, soft, NT/ND.  No hepatomegaly.   Skin: Warm and dry.  No rashes.  Extremities: No cyanosis.  2+ peripheral pulses b/l.  Musculoskeletal: No joint deformities.  No spinal or paraspinal tenderness.  Neuro: A&Ox3.      Assessment: 58 yo lady w/ recent myxedema coma (from December 16, 2022 to January 4, 2023), hypothyroidism, hypertension, diabetes, hyperlipidemia, CKD, pulmonary hypertension, asthma on albuterol presented to the ED w/respiratory distress.    Plan:  Sepsis w/u: Notified by RN overnight that patient is hypothermic with temp 90.4F. Patient seen and assessed at bedside. Currently in NAD. Patient denies feeling cold, fevers, chills, malaise, myalgias, anorexia, night sweats, generalized fatigue, lightheadedness, dizziness, syncope, headache, changes in vision, chest pain, palpitations, diaphoresis, SOB, and any other complaints.     Vital Signs Last 24 Hrs  T(C): 32.4 (20 Jan 2023 20:32), Max: 36.9 (20 Jan 2023 18:00)  T(F): 90.4 (20 Jan 2023 20:32), Max: 98.4 (20 Jan 2023 18:00)  HR: 57 (20 Jan 2023 20:32) (52 - 75)  BP: 140/69 (20 Jan 2023 20:32) (139/70 - 151/74)  RR: 18 (20 Jan 2023 20:32) (18 - 20)  SpO2: 97% (20 Jan 2023 20:32) (85% - 100%) on 4L NC    PHYSICAL EXAM:  General: Awake and alert.  No acute distress.  Head: Normocephalic, atraumatic.    Eyes: PERRL.  EOMI.  No scleral icterus.  No conjunctival pallor.  Mouth: Moist MM.  No oropharyngeal exudates.    Neck: Supple.    Heart: RRR.  Normal S1 and S2.  No murmurs, rubs, or gallops.  No LE edema b/l.   Lungs: Nonlabored breathing.  Good inspiratory effort.   Abdomen: BS+, soft, NT/ND.   Skin: Warm and dry.  No rashes.  Neuro: A&Ox3.      Assessment: Patient is a 56 yo lady w/ recent myxedema coma (from December 16, 2022 to January 4, 2023), hypothyroidism, hypertension, diabetes, hyperlipidemia, CKD, pulmonary hypertension, asthma on albuterol presented to the ED w/respiratory distress. Overnight patient hypothermic with temp of 90.4F and hyperkalemic with K of 5.5.    Plan:  -Nephrology consulted:   > Hyperkalemia: Hyperkalemia cocktail given-> 10G Lokelma PO given, HumuLIN R 5 units IVP given, Dextrose 50% IVP given   > Hyponstremia: Patient bladder scanned showing 8ml. Urine osmolality and urine sodium ordered. 3%HTS 30cc/hr over 6 hrs ordered. Will avoid overcorrection (not more then 6-7mEq per day).  -Hypothermia:   > Sepsis w/u: CBC, BMP, BCx x2, UCx, Lactate ordered    >Hyperthermia blanket   >vitals q 4 hours    Case discussed with Dr. Pearce.    Will continue to monitor patient closely.     Vicki Henson PA-C  pager [phone] Notified by RN overnight that patient is hypothermic with temp 90.4F. Patient seen and assessed at bedside. Currently in NAD. Patient denies feeling cold, fevers, chills, malaise, myalgias, anorexia, night sweats, generalized fatigue, lightheadedness, dizziness, syncope, headache, changes in vision, chest pain, palpitations, diaphoresis, SOB, and any other complaints.     Vital Signs Last 24 Hrs  T(C): 32.4 (20 Jan 2023 20:32), Max: 36.9 (20 Jan 2023 18:00)  T(F): 90.4 (20 Jan 2023 20:32), Max: 98.4 (20 Jan 2023 18:00)  HR: 57 (20 Jan 2023 20:32) (52 - 75)  BP: 140/69 (20 Jan 2023 20:32) (139/70 - 151/74)  RR: 18 (20 Jan 2023 20:32) (18 - 20)  SpO2: 97% (20 Jan 2023 20:32) (85% - 100%) on 4L NC    PHYSICAL EXAM:  General: Awake and alert.  No acute distress.  Head: Normocephalic, atraumatic.    Eyes: PERRL.  EOMI.  No scleral icterus.  No conjunctival pallor.  Mouth: Moist MM.  No oropharyngeal exudates.    Neck: Supple.    Heart: RRR.  Normal S1 and S2.  No murmurs, rubs, or gallops.  No LE edema b/l.   Lungs: Nonlabored breathing.  Good inspiratory effort.   Abdomen: BS+, soft, NT/ND.   Skin: Warm and dry.  No rashes.  Neuro: A&Ox3.      Assessment: Patient is a 58 yo lady w/ recent myxedema coma (from December 16, 2022 to January 4, 2023), hypothyroidism, hypertension, diabetes, hyperlipidemia, CKD, pulmonary hypertension, asthma on albuterol presented to the ED w/respiratory distress. Overnight patient hypothermic with temp of 90.4F and hyperkalemic with K of 5.5.    Plan:  -Nephrology consulted:   > Hyperkalemia: Hyperkalemia cocktail given-> 10G Lokelma PO given, HumuLIN R 5 units IVP given, Dextrose 50% IVP given   > Hyponitremia: Patient bladder scanned showing 8ml. Urine osmolality and urine sodium ordered. 3%HTS 30cc/hr over 6 hrs ordered. Will avoid overcorrection (not more then 6-7mEq per day). Plan to recheck BMP at 4:30 AM   -Hypothermia:   > Sepsis w/u: CBC, BMP, BCx x2, UCx, Lactate ordered    >Hyperthermia blanket ordered   >vitals q 4 hours   > Synthroid 94 micrograms IVP given at bedtime   > TSH, FT4, Cortisol ordered w/ AM labs    > Endocrine team on board.     Case discussed with Dr. Pearce.    Will continue to monitor patient closely.     Vicki Henson PA-C  pager [phone] Notified by RN overnight that patient is hypothermic with temp 90.4F. Patient seen and assessed at bedside. Currently in NAD. Patient denies feeling cold, fevers, chills, malaise, myalgias, anorexia, night sweats, generalized fatigue, lightheadedness, dizziness, syncope, headache, changes in vision, chest pain, palpitations, diaphoresis, SOB, and any other complaints.     Vital Signs Last 24 Hrs  T(C): 32.4 (20 Jan 2023 20:32), Max: 36.9 (20 Jan 2023 18:00)  T(F): 90.4 (20 Jan 2023 20:32), Max: 98.4 (20 Jan 2023 18:00)  HR: 57 (20 Jan 2023 20:32) (52 - 75)  BP: 140/69 (20 Jan 2023 20:32) (139/70 - 151/74)  RR: 18 (20 Jan 2023 20:32) (18 - 20)  SpO2: 97% (20 Jan 2023 20:32) (85% - 100%) on 4L NC    PHYSICAL EXAM:  General: Awake and alert.  No acute distress.  Head: Normocephalic, atraumatic.    Eyes: PERRL.  EOMI.  No scleral icterus.  No conjunctival pallor.  Mouth: Moist MM.  No oropharyngeal exudates.    Neck: Supple.    Heart: RRR.  Normal S1 and S2.  No murmurs, rubs, or gallops.  No LE edema b/l.   Lungs: Nonlabored breathing.  Good inspiratory effort.   Abdomen: BS+, soft, NT/ND.   Skin: Warm and dry.  No rashes.  Neuro: A&Ox3.      Assessment: Patient is a 58 yo lady w/ recent myxedema coma (from December 16, 2022 to January 4, 2023), hypothyroidism, hypertension, diabetes, hyperlipidemia, CKD, pulmonary hypertension, asthma on albuterol presented to the ED w/respiratory distress. Overnight patient hypothermic with temp of 90.4F and hyperkalemic with K of 5.5.    Plan:  -Nephrology consulted:   > Hyperkalemia: Hyperkalemia cocktail given-> 10G Lokelma PO given, HumuLIN R 5 units IVP given, Dextrose 50% IVP given   > Hyponatremia: Patient bladder scanned showing 8ml. Urine osmolality and urine sodium ordered. 3%HTS 30cc/hr over 6 hrs ordered. Will avoid overcorrection (not more then 6-7mEq per day). Plan to recheck BMP at 4:30 AM   -Hypothermia:   > Sepsis w/u: CBC, BMP, BCx x2, UCx, Lactate ordered    >Hyperthermia blanket ordered   >vitals q 4 hours   > Synthroid 94 micrograms IVP given at bedtime   > TSH, FT4, Cortisol ordered w/ AM labs    > Endocrine team on board.     Case discussed with Dr. Pearce.    Will continue to monitor patient closely.     Vicki Henson PA-C  pager [phone] Notified by RN overnight that patient is hypothermic with temp 90.4F. Patient seen and assessed at bedside. Currently in NAD. Patient denies feeling cold, fevers, chills, malaise, myalgias, anorexia, night sweats, generalized fatigue, lightheadedness, dizziness, syncope, headache, changes in vision, chest pain, palpitations, diaphoresis, SOB, and any other complaints.     Vital Signs Last 24 Hrs  T(C): 32.4 (20 Jan 2023 20:32), Max: 36.9 (20 Jan 2023 18:00)  T(F): 90.4 (20 Jan 2023 20:32), Max: 98.4 (20 Jan 2023 18:00)  HR: 57 (20 Jan 2023 20:32) (52 - 75)  BP: 140/69 (20 Jan 2023 20:32) (139/70 - 151/74)  RR: 18 (20 Jan 2023 20:32) (18 - 20)  SpO2: 97% (20 Jan 2023 20:32) (85% - 100%) on 4L NC    PHYSICAL EXAM:  General: Awake and alert.  No acute distress.  Head: Normocephalic, atraumatic.    Eyes: PERRL.  EOMI.  No scleral icterus.  No conjunctival pallor.  Mouth: Moist MM.  No oropharyngeal exudates.    Neck: Supple.    Heart: RRR.  Normal S1 and S2.  No murmurs, rubs, or gallops.  No LE edema b/l.   Lungs: Nonlabored breathing.  Good inspiratory effort.   Abdomen: BS+, soft, NT/ND.   Skin: Warm and dry.  No rashes.  Neuro: A&Ox3.      Assessment: Patient is a 56 yo lady w/ recent myxedema coma (from December 16, 2022 to January 4, 2023), hypothyroidism, hypertension, diabetes, hyperlipidemia, CKD, pulmonary hypertension, asthma on albuterol presented to the ED w/respiratory distress. Overnight patient hypothermic with temp of 90.4F and hyperkalemic with K of 5.5.    Plan:  -Nephrology consulted:   > Hyperkalemia: Hyperkalemia cocktail given-> 10G Lokelma PO given, HumuLIN R 5 units IVP given, Dextrose 50% IVP given   > Hyponatremia: Patient bladder scanned showing 8ml. Urine osmolality and urine sodium ordered. 3%HTS 30cc/hr over 6 hrs ordered. Will avoid overcorrection (not more then 6-7mEq per day). Plan to recheck BMP at 4:30 AM   -Hypothermia:   > Sepsis w/u: CBC, BMP, BCx x2, UCx, Lactate ordered    >Hyperthermia blanket ordered   >vitals q 4 hours   > Synthroid 94 micrograms IVP given at bedtime   > TSH, FT4, Cortisol ordered w/ AM labs    > Endocrine team on board.     Case discussed with Dr. Pearce.    Will continue to monitor patient closely.     Vicki Henson PA-C  pager [phone]        Addendum: Patient reassessed. Rectal temp 96.5F.    Will continue to monitor closely.    Vicki Henson PA-C  pager [phone] Notified by RN overnight that patient is hypothermic with temp 90.4F. Patient seen and assessed at bedside. Currently in NAD. Patient denies feeling cold, fevers, chills, malaise, myalgias, anorexia, night sweats, generalized fatigue, lightheadedness, dizziness, syncope, headache, changes in vision, chest pain, palpitations, diaphoresis, SOB, and any other complaints.     Vital Signs Last 24 Hrs  T(C): 32.4 (20 Jan 2023 20:32), Max: 36.9 (20 Jan 2023 18:00)  T(F): 90.4 (20 Jan 2023 20:32), Max: 98.4 (20 Jan 2023 18:00)  HR: 57 (20 Jan 2023 20:32) (52 - 75)  BP: 140/69 (20 Jan 2023 20:32) (139/70 - 151/74)  RR: 18 (20 Jan 2023 20:32) (18 - 20)  SpO2: 97% (20 Jan 2023 20:32) (85% - 100%) on 4L NC    PHYSICAL EXAM:  General: Awake and alert.  No acute distress.  Head: Normocephalic, atraumatic.    Eyes: PERRL.  EOMI.  No scleral icterus.  No conjunctival pallor.  Mouth: Moist MM.  No oropharyngeal exudates.    Neck: Supple.    Heart: RRR.  Normal S1 and S2.  No murmurs, rubs, or gallops.  No LE edema b/l.   Lungs: Nonlabored breathing.  Good inspiratory effort.   Abdomen: BS+, soft, NT/ND.   Skin: Warm and dry.  No rashes.  Neuro: A&Ox3.      Assessment: Patient is a 56 yo lady w/ recent myxedema coma (from December 16, 2022 to January 4, 2023), hypothyroidism, hypertension, diabetes, hyperlipidemia, CKD, pulmonary hypertension, asthma on albuterol presented to the ED w/respiratory distress. Overnight patient hypothermic with temp of 90.4F and hyperkalemic with K of 5.5.    Plan:  -Nephrology consulted:   > Hyperkalemia: Hyperkalemia cocktail given-> 10G Lokelma PO given, HumuLIN R 5 units IVP given, Dextrose 50% IVP given   > Hyponatremia: Patient bladder scanned showing 8ml. Urine osmolality and urine sodium ordered. 3%HTS 30cc/hr over 6 hrs ordered. Will avoid overcorrection (not more then 6-7mEq per day). Plan to recheck BMP at 4:30 AM   -Hypothermia:   > Sepsis w/u: CBC, BMP, BCx x2, UCx, Lactate ordered, portable urgent xray   >Hyperthermia blanket ordered   >vitals q 4 hours   > Synthroid 94 micrograms IVP given at bedtime   > TSH, FT4, Cortisol ordered w/ AM labs    > Endocrine team on board.     Case discussed with Dr. Pearce.    Will continue to monitor patient closely.     Vicki Henson PA-C  pager [phone]        Addendum: Patient reassessed. Rectal temp 96.5F.    Will continue to monitor closely.    Vicki Henson PA-C  pager [phone]

## 2023-01-20 NOTE — PROGRESS NOTE ADULT - PROBLEM SELECTOR PLAN 2
Unclear etiology   - Normal rectal exam per H&P and no overt s/s of bleeding   - S/p 1U PRBCs with appropriate response, required PRBCs during previous admission as well   - No RP bleed on CT   - Monitor CBC  -Hapto wnl, hiv neg  -  LDH elevated  - Iron studies with AOCD   - ?? Autoimmune vs. heme etiology?? SPEP, immunofixation   - Heme consulted as plt is also downtrending, f/u consult recs  - Also w/ mild thrombocytopenia  -f/u fob

## 2023-01-20 NOTE — CONSULT NOTE ADULT - CONSULT REASON
AHRF
Bilateral pleural effusions
Bicytopenia
Hyponatremia and ISAMAR
Severe Hypothermia
Hypothyroidism and DM2

## 2023-01-20 NOTE — CONSULT NOTE ADULT - CONSULT REQUESTED DATE/TIME
18-Jan-2023 11:07
17-Jan-2023 10:00
19-Jan-2023 00:00
20-Jan-2023 13:51
18-Jan-2023 12:19
17-Jan-2023 17:39

## 2023-01-20 NOTE — PROGRESS NOTE ADULT - PROBLEM SELECTOR PLAN 3
In the setting of fluid overload. SNa was low at 121. Repeat SNa improved to 123. Last SNa is low at 120 today. Alfie is at 47, U Osm is low at 194, serum cortisol is elevated at 21. Continue with IV diuretics as outlined above. Restrict fluid intake <1L per day. Monitor SNa q8.    If you have any questions, please feel free to contact me  Chuck Harvey  Nephrology Fellow  [phone]/ [employer] Teams(Preferred)  (After 5pm or on weekends please page the on-call fellow).

## 2023-01-20 NOTE — PROGRESS NOTE ADULT - PROBLEM SELECTOR PLAN 1
Patient w/ hypothermia POA and tachypnea POA meeting sepsis criteria   - Worsening respiratory distress, placed on BIPAP from nasal cannula. Patient recently required intubation at OSH. Low threshold for MICU re-eval   - Pulm consulted and jarrettg, discussed with pulm per pulm no role for thoracentesis    - CTA chest with bilateral pleural effusions and GGO, no PE. CT A/P commenting on increasing basilar airspace opacity compared to prior examination with dense consolidation in both lower lobes and right greater than left pleural effusions, not significantly changed.  - Given consolidations on CT, will treat empirically with Zosyn + Vanco (by level) given recent hospitalizations. Check -f/u sputum cx  - MRSA swab neg  - legionella  -Stop Vanco   - TTE showed grossly normal left ventricular systolic function. Mild diastolic dysfunction (Stage I). Right ventricular enlargement with decreased right ventricular systolic function. Estimated right ventricular systolic pressure equals 55  mm Hg, assuming right atrial pressure equals 10 mm Hg, consistent with moderate pulmonary hypertension.  - Diuresis w/ bumex per renal recs   - Unclear the cause of her persistent hospitalizations for multiorgan failure, she does not appear to be in myxedema coma - is this rheumatologic vs. hematologic? f/u ANCAs, complement, STEVO, dsDNA + SPEP/UPEP, immunofixation   - Cards consulted and bianca, may need RHC, was scheduled for cath today but cancelled due to thrombocytopenia and metabolic derangement  -hiv neg, hep panel neg

## 2023-01-20 NOTE — PROGRESS NOTE ADULT - ASSESSMENT
57F with hypothyroidism, HTN, T2DM, HLD, CKD, PAH, asthma on albuterol presented to the ED w/respiratory distress. Endocrine consulted for hypothyroidism and DM2.    Hypothyroidism, not in myxedema  Taking Levothyroxine 125mcg PO as prescribed. TSH 2.56 on 1/17/23. FT4 1.6 normal.  -Patient had not received 125mcg PO levothyroxine in 2 days for unknown reason. Switched to IV formulation which she received last night.   Proceed with levothyroxine 94mcg daily IV for now. Plan to resume home oral dose at or closer to discharge.  -Low sodium and hypothermia unlikely to be related to hypothyroidism (or adrenal insufficiency). Recommend pursuing non-endocrine workup. Nephrology consulted.  Discharge plan: levothyroxine 125 mcg po daily, take in AM on empty stomach    Controlled T2DM with hyperglycemia  Patient with a hx of DM2. HbA1c 6.9%. Has had DM for 23 years. Takes repaglinide 2mg TIDac and Tresiba 1-12 units depending on BG. Checks BG at bedtime. Sometimes BG in the 200s. No lows. Has a good appetite.  -Hold non-insulin DM agents while inpatient  -BG target 100-180 - improved today  Continue Admelog 2/2/2 units premeal TID  Continue Lantus 2 units qhs  -continue low dose Admelog correction scale pre-meal  -continue low dose Admelog correction scale at bedtime  -Fingerstick BG before meals and bedtime  -Goal -180  -Carbohydrate consistent diet  Discharge plan:  -Likely to discharge patient home on home regimen (see above). Final regimen pending clinical course.    HLD  -On atorvastatin 40mg daily. Continue this if no contraindications.    Endocrine follow up appointment is scheduled with nurse practitioner.  [address] [facility] [address], [phone]  March 2nd at 11AM    Citlalli Mejia MD  Division of Endocrinology  Pager: [phone]    If after 6PM or before 9AM, or on weekends/holidays, please call endocrine answering service for assistance ([phone]).  For nonurgent matters email [email or web address] for assistance.

## 2023-01-20 NOTE — PROGRESS NOTE ADULT - ASSESSMENT
57F hypothyroidism (recent admissions for myxedema coma), HF? / moderate pHTN (TTE , unclear volume status at that time, normal LV/RV size and function), asthma, HTN, T2DM, hyperlipidemia, CKD, presented to the ED with shortness of breath found to have acute hypoxemic resp failure. Pulm consulted for further management.    Assessment: Respiratory failure likely multifactorial in setting of volume overload and possible PNA. Patient has moderate bilateral pleural effusions on imaging with dense consolidations bilaterally. She has lower extremity edema. Pro BNP 1600. She was hypothermic in ED. She had increased WOB on  and was started on bilevel. She has received diuretics and she appears improved. Started on broad spectrum abx. Unclear clinical picture, may have serositis given pleural and pericardial effusions. No known autoimmune or rheum disease. No findings on exam such as rash or abnormal hand findings.     Ultrasound  shows smaller bilateral effusions. Patient reports symptomatic improvement.  : Patient still with small effusions however has lower extremity edema. Appears overloaded. BNP 1600 earlier this admission. Responding to diuretics     Plan:  C/w diuresis  C/w abx  Duonebs q6 standing   Check sputum culture if able  RHC planned for Monday with cardiology    Pulm will follow    This patient will need to close hospital follow up after discharge with the pulmonary team:    Please email: [email or web address] to setup an a close hospital follow up appointment for the patient  prior to discharge with any available pulmonologist. The appointment should be within 1 week of discharge from the hospital. Include the patient's name, , MRN and contact information in the email.      [facility]  [address] [place] [address]  [phone]    Please discuss the appointment details with the patient and include the appointment details in the patients discharge summary.

## 2023-01-20 NOTE — PROGRESS NOTE ADULT - SUBJECTIVE AND OBJECTIVE BOX
Chief Complaint: Hypothyroid, DM2    History: resting in bed, arousable  denies complaints  tolerating po  no hypoglycemia events    MEDICATIONS  (STANDING):  albuterol/ipratropium for Nebulization 3 milliLiter(s) Nebulizer every 6 hours  buMETAnide Injectable 2 milliGRAM(s) IV Push every 12 hours  dextrose 5%. 1000 milliLiter(s) (50 mL/Hr) IV Continuous <Continuous>  dextrose 5%. 1000 milliLiter(s) (100 mL/Hr) IV Continuous <Continuous>  dextrose 50% Injectable 25 Gram(s) IV Push once  dextrose 50% Injectable 12.5 Gram(s) IV Push once  dextrose 50% Injectable 25 Gram(s) IV Push once  glucagon  Injectable 1 milliGRAM(s) IntraMuscular once  heparin   Injectable 5000 Unit(s) SubCutaneous every 8 hours  influenza   Vaccine 0.5 milliLiter(s) IntraMuscular once  insulin glargine Injectable (LANTUS) 2 Unit(s) SubCutaneous at bedtime  insulin lispro (ADMELOG) corrective regimen sliding scale   SubCutaneous three times a day before meals  insulin lispro (ADMELOG) corrective regimen sliding scale   SubCutaneous at bedtime  insulin lispro Injectable (ADMELOG) 2 Unit(s) SubCutaneous three times a day before meals  levothyroxine Injectable 94 MICROGram(s) IV Push at bedtime  mupirocin 2% Ointment 1 Application(s) Topical three times a day  pantoprazole  Injectable 40 milliGRAM(s) IV Push daily  piperacillin/tazobactam IVPB.. 3.375 Gram(s) IV Intermittent every 12 hours    MEDICATIONS  (PRN):  acetaminophen     Tablet .. 650 milliGRAM(s) Oral every 6 hours PRN Temp greater or equal to 38C (100.4F), Mild Pain (1 - 3)  aluminum hydroxide/magnesium hydroxide/simethicone Suspension 30 milliLiter(s) Oral every 4 hours PRN Dyspepsia  dextrose Oral Gel 15 Gram(s) Oral once PRN Blood Glucose LESS THAN 70 milliGRAM(s)/deciliter  guaiFENesin Oral Liquid (Sugar-Free) 200 milliGRAM(s) Oral every 6 hours PRN Cough  melatonin 3 milliGRAM(s) Oral at bedtime PRN Insomnia  ondansetron Injectable 4 milliGRAM(s) IV Push every 8 hours PRN Nausea and/or Vomiting      Allergies    No Known Allergies    Intolerances      Review of Systems:  ALL OTHER SYSTEMS REVIEWED AND NEGATIVE      PHYSICAL EXAM:  VITALS: T(C): 36.2 (01-20-23 @ 05:00)  T(F): 97.1 (01-20-23 @ 05:00), Max: 97.1 (01-20-23 @ 05:00)  HR: 52 (01-20-23 @ 13:56) (52 - 94)  BP: 139/70 (01-20-23 @ 13:56) (131/40 - 141/83)  RR:  (18 - 20)  SpO2:  (85% - 100%)  Wt(kg): --  GENERAL: NAD, well-groomed, well-developed  EYES: No proptosis, no lid lag, anicteric  HEENT:  Atraumatic, Normocephalic, moist mucous membranes  RESPIRATORY: nonlabored respirations, no wheezing  PSYCH: Alert and oriented x 3, normal affect, normal mood    CAPILLARY BLOOD GLUCOSE      POCT Blood Glucose.: 174 mg/dL (20 Jan 2023 12:19)  POCT Blood Glucose.: 137 mg/dL (20 Jan 2023 08:20)  POCT Blood Glucose.: 149 mg/dL (19 Jan 2023 21:14)  POCT Blood Glucose.: 240 mg/dL (19 Jan 2023 16:50)      01-20    120<LL>  |  87<L>  |  36<H>  ----------------------------<  159<H>  5.0   |  19<L>  |  2.67<H>    eGFR: 20<L>    Ca    8.6      01-20  Mg     2.20     01-20  Phos  4.6     01-20    TPro  6.4  /  Alb  3.2<L>  /  TBili  1.1  /  DBili  x   /  AST  82<H>  /  ALT  75<H>  /  AlkPhos  405<H>  01-20      A1C with Estimated Average Glucose Result: 6.9 % (01-17-23 @ 07:10)  A1C with Estimated Average Glucose Result: 9.7 % (11-25-22 @ 14:20)  A1C with Estimated Average Glucose Result: 9.1 % (09-29-22 @ 06:00)  A1C with Estimated Average Glucose Result: 9.2 % (09-28-22 @ 07:47)      Thyroid Function Tests:  01-20 @ 06:04 TSH -- FreeT4 1.6 T3 -- Anti TPO -- Anti Thyroglobulin Ab -- TSI --  01-18 @ 06:00 TSH -- FreeT4 -- T3 54 Anti TPO -- Anti Thyroglobulin Ab -- TSI --

## 2023-01-20 NOTE — PROGRESS NOTE ADULT - PROBLEM SELECTOR PLAN 7
Patient with two recent hospitalizations, one at [facility] and one at OSH for myxedema coma   - Endo following, currently no evidence of myxedema   - TSH wnl   - C/w iv synthroid

## 2023-01-20 NOTE — PROGRESS NOTE ADULT - SUBJECTIVE AND OBJECTIVE BOX
[facility] DIVISION OF KIDNEY DISEASES AND HYPERTENSION -- FOLLOW UP NOTE  --------------------------------------------------------------------------------  HPI: 58 y/o female with Hx of CKD, Hypothyroidism, HTN, DM, PAH, asthma, and HLD who is admitted at [facility] on 1/18 for SOB. Of note, the pt was recently admitted at [facility] for myxedema coma, delirium, and multiorgan failure in which she was intubated at the time and received vasopressors and broad spectrum antibiotics. In the ED, pt was placed on BIPAP for respiratory distress and received IV lasix mg once. CT chest done in ER with moderate pleural effusions. Pt later received IV Bumex 1 mg BID. Nephrology consulted for hyponatremia and ISAMAR.    24 hour events/subjective:  Pt. seen and examined at bedside. Pt. reports improvement in her breathing, currently on NC. Denies any SOB, CP, HA, N/V, abdominal pain , urinary symptoms or dizziness.      PAST HISTORY  --------------------------------------------------------------------------------  No significant changes to PMH, PSH, FHx, SHx, unless otherwise noted    ALLERGIES & MEDICATIONS  --------------------------------------------------------------------------------  Allergies    No Known Allergies    Intolerances    Standing Inpatient Medications  albuterol/ipratropium for Nebulization 3 milliLiter(s) Nebulizer every 6 hours  buMETAnide Injectable 2 milliGRAM(s) IV Push every 12 hours  dextrose 5%. 1000 milliLiter(s) IV Continuous <Continuous>  dextrose 5%. 1000 milliLiter(s) IV Continuous <Continuous>  dextrose 50% Injectable 25 Gram(s) IV Push once  dextrose 50% Injectable 12.5 Gram(s) IV Push once  dextrose 50% Injectable 25 Gram(s) IV Push once  glucagon  Injectable 1 milliGRAM(s) IntraMuscular once  heparin   Injectable 5000 Unit(s) SubCutaneous every 8 hours  influenza   Vaccine 0.5 milliLiter(s) IntraMuscular once  insulin glargine Injectable (LANTUS) 2 Unit(s) SubCutaneous at bedtime  insulin lispro (ADMELOG) corrective regimen sliding scale   SubCutaneous three times a day before meals  insulin lispro (ADMELOG) corrective regimen sliding scale   SubCutaneous at bedtime  insulin lispro Injectable (ADMELOG) 2 Unit(s) SubCutaneous three times a day before meals  levothyroxine Injectable 94 MICROGram(s) IV Push at bedtime  mupirocin 2% Ointment 1 Application(s) Topical three times a day  pantoprazole  Injectable 40 milliGRAM(s) IV Push daily  piperacillin/tazobactam IVPB.. 3.375 Gram(s) IV Intermittent every 8 hours    PRN Inpatient Medications  acetaminophen     Tablet .. 650 milliGRAM(s) Oral every 6 hours PRN  aluminum hydroxide/magnesium hydroxide/simethicone Suspension 30 milliLiter(s) Oral every 4 hours PRN  dextrose Oral Gel 15 Gram(s) Oral once PRN  guaiFENesin Oral Liquid (Sugar-Free) 200 milliGRAM(s) Oral every 6 hours PRN  melatonin 3 milliGRAM(s) Oral at bedtime PRN  ondansetron Injectable 4 milliGRAM(s) IV Push every 8 hours PRN    REVIEW OF SYSTEMS  --------------------------------------------------------------------------------  Gen: No fevers   Respiratory: No dyspnea, see HPI  CV: No chest pain  GI: No abdominal pain  : No dysuria  MSK: B/l LE  edema  Neuro: no dizziness   All other systems were reviewed and are negative, except as noted.    VITALS/PHYSICAL EXAM  --------------------------------------------------------------------------------  T(C): 36.2 (01-20-23 @ 05:00), Max: 36.2 (01-19-23 @ 12:23)  HR: 70 (01-20-23 @ 05:00) (68 - 94)  BP: 141/83 (01-20-23 @ 05:00) (131/40 - 150/84)  RR: 18 (01-20-23 @ 05:00) (18 - 19)  SpO2: 100% (01-20-23 @ 05:00) (94% - 100%)  Wt(kg): --    Physical Exam:  	Gen: resting  	HEENT: MMM, on NC  	Pulm: Poor AE at bases  	CV: S1S2  	Abd: Soft, +BS   	Ext: B/L LE edema +  	Neuro: Awake  	Skin: Warm and dry  	Vascular access: IV peripheral canula    LABS/STUDIES  --------------------------------------------------------------------------------              7.2    10.81 >-----------<  x        [01-20-23 @ 06:04]              21.2     120  |  87  |  34  ----------------------------<  205      [01-20-23 @ 06:04]  5.8   |  19  |  2.53        Ca     8.6     [01-20-23 @ 06:04]      Mg     1.70     [01-20-23 @ 06:04]      Phos  4.6     [01-20-23 @ 06:04]    TPro  6.4  /  Alb  3.2  /  TBili  1.1  /  DBili  x   /  AST  82  /  ALT  75  /  AlkPhos  405  [01-20-23 @ 06:04]    Uric acid 6.3      [01-19-23 @ 05:02]        [01-19-23 @ 05:02]    Creatinine Trend:  SCr 2.53 [01-20 @ 06:04]  SCr 2.24 [01-19 @ 16:01]  SCr 2.01 [01-19 @ 05:02]  SCr 1.56 [01-18 @ 06:00]  SCr 1.52 [01-18 @ 03:50]    Urinalysis - [01-18-23 @ 05:58]      Color Colorless / Appearance Clear / SG 1.011 / pH 6.0      Gluc Trace / Ketone Negative  / Bili Negative / Urobili <2 mg/dL       Blood Trace / Protein 30 mg/dL / Leuk Est Negative / Nitrite Negative      RBC 4 / WBC 3 / Hyaline 1 / Gran  / Sq Epi  / Non Sq Epi 3 / Bacteria Negative    Urine Creatinine 23      [01-19-23 @ 10:28]  Urine Protein 116      [01-19-23 @ 10:28]  Urine Sodium 47      [01-18-23 @ 05:58]  Urine Urea Nitrogen 114.0      [01-18-23 @ 05:58]  Urine Potassium 14.2      [01-18-23 @ 05:58]  Urine Chloride 45      [01-17-23 @ 09:00]  Urine Phosphorus 33.9      [01-17-23 @ 09:00]  Urine Osmolality 194      [01-18-23 @ 05:58]    Iron 42, TIBC 306, %sat 14      [01-17-23 @ 07:10]  Ferritin 729      [01-17-23 @ 07:10]  TSH 2.51      [01-18-23 @ 03:50]  Lipid: chol 107, TG 28, HDL 61, LDL --      [11-25-22 @ 21:00]    HBsAg Nonreact      [01-19-23 @ 05:02]  HCV 0.13, Nonreact      [01-19-23 @ 05:02]  HIV Nonreact      [01-19-23 @ 05:02]    C3 Complement 128      [01-19-23 @ 05:02]  C4 Complement 46      [01-19-23 @ 05:02]  Rheumatoid Factor 10      [01-19-23 @ 05:02]  Free Light Chains: kappa 8.92, lambda 3.79, ratio = 2.35      [01-19 @ 05:02]  SPEP Interpretation: Increased Beta fraction, possibly transferrin increase. HUMPHREY Mccrary MD      [01-19-23 @ 05:02]

## 2023-01-20 NOTE — CONSULT NOTE ADULT - ASSESSMENT
58 yo with a recent hospitalization (12/16/22 - 1/4/23) for myxedema coma requiring intubation, hypothyroidism, HTN, DM, HLD, CKD, pHTN, asthma admitted for acute hypoxic respiratory failure secondary to pneumonia and volume overload, found to be incidentally bicytopenic. Hematology consulted for bicytopenia.    #Bicytopenia (anemia and thrombocytopenia)  Pt initially presented with platelets of 122 that have decreased to ~60K and a hemoglobin ~7 (with 1 unit transfused during this admission). Per chart review, patient's counts were normal in October 2022  - Given prolonged prior hospitalization and multiple comorbidites, suspect that anemia is likely multifactorial from renal insufficiency, bone marrow ischemia (during ICU stay at OSH), hypothyroidism, and suppression from critical illness.  - Iron Panel showing elevated ferritin with normal TIBC and borderline low Iron saturation showing a mixed picture  - Given normal haptoglobin, slightly elevated LDH, inappropriately low reticulocyte count, and normal total bilirubin, this is not consistent with a hemolytic process.  - Please check HIV and hepatitis panel to rule out infectious causes as well as Vitamin B12, and folate to assess for nutritional deficiencies  - PBS to be reviewed  - Please check Coags, d-dimer, and fibrinogen.    Attending recommendations to follow.    Kristel Larios M.D.  Hematology and Medical Oncology Fellow  Pager: [phone]  For weekends and evenings (5 pm - 8 am), please page Heme/Onc fellow on call. 58 yo F with a recent hospitalization (12/16/22 - 1/4/23) for myxedema coma requiring intubation, hypothyroidism, HTN, DM, HLD, CKD, pHTN, asthma admitted for acute hypoxic respiratory failure secondary to pneumonia and volume overload, found to be incidentally bicytopenic. Hematology consulted for bicytopenia.    #Bicytopenia (anemia and thrombocytopenia)  Pt initially presented with platelets of 122 that have decreased to ~60K and a hemoglobin ~7 (with 1 unit transfused during this admission). Per chart review, patient's counts were normal in October 2022  - Given prolonged prior hospitalization and multiple comorbidites, suspect that anemia is likely multifactorial from renal insufficiency, bone marrow ischemia (during ICU stay at OSH), hypothyroidism, and suppression from critical illness.  - Iron Panel showing elevated ferritin with normal TIBC and borderline low Iron saturation showing a mixed picture  - Given normal haptoglobin, slightly elevated LDH, inappropriately low reticulocyte count, and normal total bilirubin, this is not consistent with a hemolytic process.  - Please check HIV and hepatitis panel to rule out infectious causes as well as Vitamin B12, and folate to assess for nutritional deficiencies  - PBS to be reviewed  - Please check Coags, d-dimer, and fibrinogen.    Discussed with Dr. Henry Larios M.D.  Hematology and Medical Oncology Fellow  Pager: [phone]  For weekends and evenings (5 pm - 8 am), please page Heme/Onc fellow on call.

## 2023-01-20 NOTE — CHART NOTE - NSCHARTNOTEFT_GEN_A_CORE
Pt seen and evaluated again at 6 pm. Pt reports improvement in breathing. Currently on NC. Denies SOB, CP or dizziness. Cardiology note regarding RHC reviewed , planned for RHC on Monday (1/23/23). Pulmonology note from today reviewed.     Repeat labs done at ~8pm showed SNa down trended to 118 with serum potassium at 5.5 (non hemolyzed). Recommend medical management for hyperkalemia- insulin with D50, Lokelma 10 mg PO once. Restrict fluid intake <1L per day. Send Alfie, U Osm. Recommend to start 3%HTS 30cc/hr over 6 hrs. Repeat BMP in 4 hrs. Bladder scan to rule urine retention. Avoid overcorrection (not more then 6-7mEq per day). Monitor SNa q6 for now.     Plan discussed with primary team.     If you have any questions, please feel free to contact me  Chuck Harvey  Nephrology Fellow  [phone]/ [employer] Teams(Preferred)  (After 5pm or on weekends please page the on-call fellow).

## 2023-01-20 NOTE — PROGRESS NOTE ADULT - SUBJECTIVE AND OBJECTIVE BOX
Patient is a 57y old  Female who presents with a chief complaint of Respiratory distress (20 Jan 2023 08:53)      SUBJECTIVE / OVERNIGHT EVENTS: Pt seen and examined at 11:25am, no overnight events, comfortable on current nasal canula oxygen, sob improving, report 3 loose stools last night and twice this am, denies any nausea, vomiting, abdominal pain, chest pain or any other complaints, no other new issues reported.      MEDICATIONS  (STANDING):  albuterol/ipratropium for Nebulization 3 milliLiter(s) Nebulizer every 6 hours  buMETAnide Injectable 2 milliGRAM(s) IV Push every 12 hours  dextrose 5%. 1000 milliLiter(s) (50 mL/Hr) IV Continuous <Continuous>  dextrose 5%. 1000 milliLiter(s) (100 mL/Hr) IV Continuous <Continuous>  dextrose 50% Injectable 25 Gram(s) IV Push once  dextrose 50% Injectable 12.5 Gram(s) IV Push once  dextrose 50% Injectable 25 Gram(s) IV Push once  glucagon  Injectable 1 milliGRAM(s) IntraMuscular once  heparin   Injectable 5000 Unit(s) SubCutaneous every 8 hours  influenza   Vaccine 0.5 milliLiter(s) IntraMuscular once  insulin glargine Injectable (LANTUS) 2 Unit(s) SubCutaneous at bedtime  insulin lispro (ADMELOG) corrective regimen sliding scale   SubCutaneous three times a day before meals  insulin lispro (ADMELOG) corrective regimen sliding scale   SubCutaneous at bedtime  insulin lispro Injectable (ADMELOG) 2 Unit(s) SubCutaneous three times a day before meals  levothyroxine Injectable 94 MICROGram(s) IV Push at bedtime  mupirocin 2% Ointment 1 Application(s) Topical three times a day  pantoprazole  Injectable 40 milliGRAM(s) IV Push daily  piperacillin/tazobactam IVPB.. 3.375 Gram(s) IV Intermittent every 8 hours    MEDICATIONS  (PRN):  acetaminophen     Tablet .. 650 milliGRAM(s) Oral every 6 hours PRN Temp greater or equal to 38C (100.4F), Mild Pain (1 - 3)  aluminum hydroxide/magnesium hydroxide/simethicone Suspension 30 milliLiter(s) Oral every 4 hours PRN Dyspepsia  dextrose Oral Gel 15 Gram(s) Oral once PRN Blood Glucose LESS THAN 70 milliGRAM(s)/deciliter  guaiFENesin Oral Liquid (Sugar-Free) 200 milliGRAM(s) Oral every 6 hours PRN Cough  melatonin 3 milliGRAM(s) Oral at bedtime PRN Insomnia  ondansetron Injectable 4 milliGRAM(s) IV Push every 8 hours PRN Nausea and/or Vomiting      Vital Signs Last 24 Hrs  T(C): 36.2 (20 Jan 2023 05:00), Max: 36.2 (20 Jan 2023 05:00)  T(F): 97.1 (20 Jan 2023 05:00), Max: 97.1 (20 Jan 2023 05:00)  HR: 70 (20 Jan 2023 05:00) (68 - 94)  BP: 141/83 (20 Jan 2023 05:00) (131/40 - 141/83)  BP(mean): --  RR: 18 (20 Jan 2023 05:00) (18 - 18)  SpO2: 100% (20 Jan 2023 05:00) (95% - 100%)    Parameters below as of 20 Jan 2023 05:00  Patient On (Oxygen Delivery Method): nasal cannula  O2 Flow (L/min): 4    CAPILLARY BLOOD GLUCOSE      POCT Blood Glucose.: 174 mg/dL (20 Jan 2023 12:19)  POCT Blood Glucose.: 137 mg/dL (20 Jan 2023 08:20)  POCT Blood Glucose.: 149 mg/dL (19 Jan 2023 21:14)  POCT Blood Glucose.: 240 mg/dL (19 Jan 2023 16:50)    I&O's Summary      PHYSICAL EXAM:  GENERAL: NAD, well-developed  CHEST/LUNG: Decreased bs at bases b/l  HEART: Regular rate and rhythm  ABDOMEN: Soft, Nontender, non distended  EXTREMITIES: b/l LE trace edema  PSYCH: Calm  NEUROLOGY: AAOx3  SKIN: No rashes or lesions    LABS:                        7.4    8.86  )-----------( 61       ( 20 Jan 2023 12:10 )             22.1     01-20    120<LL>  |  87<L>  |  36<H>  ----------------------------<  159<H>  5.0   |  19<L>  |  2.67<H>    Ca    8.6      20 Jan 2023 12:10  Phos  4.6     01-20  Mg     2.20     01-20    TPro  6.4  /  Alb  3.2<L>  /  TBili  1.1  /  DBili  x   /  AST  82<H>  /  ALT  75<H>  /  AlkPhos  405<H>  01-20              RADIOLOGY & ADDITIONAL TESTS:    Imaging Personally Reviewed:    Consultant(s) Notes Reviewed:      Care Discussed with Consultants/Other Providers:

## 2023-01-20 NOTE — CONSULT NOTE ADULT - ATTENDING COMMENTS
57F hypothyroidism (recent admissions for myxedema coma), asthma, HTN, T2DM, hyperlipidemia, CKD, presented to the ED with shortness of breath and acute hypoxemic respiratory failure. She is grossly fluid overloaded with bilateral effusions, hypertension, and lower extremity edema.   - Recommend continued diuresis.  - No plan for thoracentesis at this time  - Continue bilevel with breaks as needed  - Daisy ATC  - TTE  - Check sputum culture, if able  - Agree with empiric antibiotics
58 yo F with a recent hospitalization (12/16/22 - 1/4/23) for myxedema coma requiring intubation, hypothyroidism, HTN, DM, HLD, CKD, pHTN, asthma admitted for acute hypoxic respiratory failure secondary to pneumonia and volume overload, found to be incidentally bicytopenic. Pt initially presented with platelets of 122, that have decreased to ~60K and a hemoglobin ~7 (with 1 unit transfused during this admission). Given prolonged prior hospitalization and multiple comorbidities, suspect that anemia is likely multifactorial from renal insufficiency, bone marrow ischemia (during ICU stay at OSH), hypothyroidism, and suppression from critical illness. Would check HIV, hepatitis panel, B12/folate and will review PBS. Hematology will follow with you.
Agree with fellow's assessment above.   Echo with normal LV systolic function, and decreased RV function.  RHC invasive for hemodynamic evaluation and assessment of pulm HTN.  Pulmonary recommendations appreciated.     Jw Trujillo MD  Attending Physician   Cardiology Inpatient Consult Service     [facility] [place]   [address], [address]  [place] [address]   Tel: [phone]  Fax: [phone]    Please check [email or web address]: [email or web address]" for cardiology service schedule and contact information.
Pt. examined and case reviewed in detail on bedside rounds     Does not MICU level at the present time Reconsult as needed
57F hypothyroidism which was uncontrolled last admission, levothyroxine raised and now with normal TSH (follow up free T4) therefore not indicative of myxedema. AM cortisol robust and not low. Regarding hyponatremia check for nonendocrine etiologies.    Citlalli Mejia MD  Division of Endocrinology  Pager: [phone]    If after 6PM or before 9AM, or on weekends/holidays, please call endocrine answering service for assistance ([phone]).  For nonurgent matters email [email or web address] for assistance.
Hyponatremia  ISAMAR on CKD  Hypervolemia  Nephrotic range proteinuria      ISAMAR multifactorial, pre renal state in the setting of volume overload/contrast use.  Would consider Bumex 2mg IVP to help with respiratory status. Consider bipap/ pulm or ICU eval.   Monitor Is/Os and daily weights. Patient with long standing diabetes so she probably has some underlying diabetic nephropathy. Hold off from biopsy for now as most likely cause. Please send GN workup for proteinuria. SPEP, Immunofixation, Free light chains, STEVO, C3, C4, uric acid, hepatitis panel, HIV.

## 2023-01-20 NOTE — PROGRESS NOTE ADULT - PROBLEM SELECTOR PLAN 8
A1c = 6.9  - C/w ISS and adjust regimen as needed   on lantus 2 units and premeal 2 units tid  - Endo following

## 2023-01-20 NOTE — PROGRESS NOTE ADULT - SUBJECTIVE AND OBJECTIVE BOX
Patient is a 57y old  Female who presents with a chief complaint of Respiratory distress (20 Jan 2023 15:49)      SUBJECTIVE / OVERNIGHT EVENTS:    Patient seen and examined at bedside. No acute events overnight.    T(F): 90.4 (01-20 @ 20:32), Max: 98.4 (01-20 @ 18:00)  HR: 57 (01-20 @ 20:32) (52 - 75)  BP: 140/69 (01-20 @ 20:32) (131/40 - 151/74)  RR: 18 (01-20 @ 20:32) (18 - 20)  SpO2: 97% (01-20 @ 20:32) (85% - 100%)    I&O's Summary      MEDICATIONS  (STANDING):  albuterol/ipratropium for Nebulization 3 milliLiter(s) Nebulizer every 6 hours  buMETAnide Injectable 2 milliGRAM(s) IV Push every 12 hours  dextrose 5%. 1000 milliLiter(s) (50 mL/Hr) IV Continuous <Continuous>  dextrose 5%. 1000 milliLiter(s) (100 mL/Hr) IV Continuous <Continuous>  dextrose 50% Injectable 25 Gram(s) IV Push once  dextrose 50% Injectable 12.5 Gram(s) IV Push once  dextrose 50% Injectable 25 Gram(s) IV Push once  glucagon  Injectable 1 milliGRAM(s) IntraMuscular once  heparin   Injectable 5000 Unit(s) SubCutaneous every 8 hours  influenza   Vaccine 0.5 milliLiter(s) IntraMuscular once  insulin glargine Injectable (LANTUS) 2 Unit(s) SubCutaneous at bedtime  insulin lispro (ADMELOG) corrective regimen sliding scale   SubCutaneous three times a day before meals  insulin lispro (ADMELOG) corrective regimen sliding scale   SubCutaneous at bedtime  insulin lispro Injectable (ADMELOG) 2 Unit(s) SubCutaneous three times a day before meals  levothyroxine Injectable 94 MICROGram(s) IV Push at bedtime  mupirocin 2% Ointment 1 Application(s) Topical three times a day  pantoprazole  Injectable 40 milliGRAM(s) IV Push daily  piperacillin/tazobactam IVPB.. 3.375 Gram(s) IV Intermittent every 12 hours    MEDICATIONS  (PRN):  acetaminophen     Tablet .. 650 milliGRAM(s) Oral every 6 hours PRN Temp greater or equal to 38C (100.4F), Mild Pain (1 - 3)  aluminum hydroxide/magnesium hydroxide/simethicone Suspension 30 milliLiter(s) Oral every 4 hours PRN Dyspepsia  dextrose Oral Gel 15 Gram(s) Oral once PRN Blood Glucose LESS THAN 70 milliGRAM(s)/deciliter  guaiFENesin Oral Liquid (Sugar-Free) 200 milliGRAM(s) Oral every 6 hours PRN Cough  melatonin 3 milliGRAM(s) Oral at bedtime PRN Insomnia  ondansetron Injectable 4 milliGRAM(s) IV Push every 8 hours PRN Nausea and/or Vomiting      Constitutional: denies fevers, chills, night sweats, weight loss  HEENT: denies visual changes, cough  Cardiovascular: denies palpitations, chest pain, edema  Respiratory: denies SOB, wheezing  Gastrointestinal: denies N/V/D, abdominal pain, hematochezia, melena  : denies dysuria, urinary urgency, increased frequency  MSK: denies muscle weakness, joint pain  Skin: denies new rashes or masses  Heme: denies bleeding, bruising  Neuro: denies headache, weakness    PHYSICAL EXAM:   GEN: Age appropriate, resting comfortably in bed, no acute distress, non toxic appearing, speaking in complete sentences.   HEENT: Conjunctiva and sclera normal  PULM: Lungs decreased breath sounds at the bases  CV: RRR, S1S2, no MRG  MSK: no stiffness or joint effusions  Abdominal: Soft, nontender to palpation, non-distended, +BS  Extremities: No edema or cyanosis  NEURO: AAOx3  Psych: normal affect, normal behavior  Skin: No rashes, lesions    LABS:  Labs personally reviewed.                        7.1    8.81  )-----------( 56       ( 20 Jan 2023 19:51 )             21.4     Hgb Trend: 7.1<--, 7.4<--, 7.2<--, 8.0<--, 7.7<--  01-20    x   |  x   |  37<H>  ----------------------------<  140<H>  x    |  17<L>  |  2.77<H>    Ca    8.6      20 Jan 2023 19:51  Phos  4.7     01-20  Mg     2.10     01-20    TPro  6.4  /  Alb  3.2<L>  /  TBili  1.1  /  DBili  x   /  AST  82<H>  /  ALT  75<H>  /  AlkPhos  405<H>  01-20    Creatinine Trend: 2.77<--, 2.67<--, 2.53<--, 2.24<--, 2.01<--, 1.56<--  PT/INR - ( 20 Jan 2023 19:51 )   PT: 14.7 sec;   INR: 1.26 ratio         PTT - ( 20 Jan 2023 19:51 )  PTT:51.9 sec        [facility]  Pulmonary and Critical Care Fellow    PGY-5 Pager: [facility]-[phone]  [phone]  [facility] Pulmonary Spectra [phone]   Night Float:

## 2023-01-20 NOTE — PROGRESS NOTE ADULT - SUBJECTIVE AND OBJECTIVE BOX
Cardiology Progress Note  ------------------------------------------------------------------------------------------  SUBJECTIVE:   No events overnight.     Denies orthopnea but continues to report mild SOB, denies chest pain   -------------------------------------------------------------------------------------------  ROS:  CV: chest pain (-), palpitation (-), orthopnea (-), PND (-), edema (-)  PULM: SOB (+), cough (-), wheezing (-), hemoptysis (-).   CONST: fever (-), chills (-) or fatigue (-)  GI: abdominal distension (-), abdominal pain (-) , nausea/vomiting (-), hematemesis, (-), melena (-), hematochezia (-), diarrhea +  : dysuria (-), frequency (-), hematuria (-).   NEURO: numbness (-), weakness (-), dizziness (-)  MSK: myalgia (-), joint pain (-)   SKIN: itching (-), rash (-)  HEENT:  visual changes (-); vertigo or throat pain (-);  neck stiffness (-)   Psych: change in mood (-), anxiety (-), depression (-)     All other review of systems is negative unless indicated above.   -------------------------------------------------------------------------------------------  VS:  T(F): 97.1 (01-20), Max: 97.1 (01-19)  HR: 70 (01-20) (68 - 94)  BP: 141/83 (01-20) (131/40 - 150/84)  RR: 18 (01-20)  SpO2: 100% (01-20)  I&O's Summary    ------------------------------------------------------------------------------------------  PHYSICAL EXAM:  GENERAL: NAD  NECK:  No JVD.  CHEST/LUNG: Decreased breath sounds   HEART: Regular rate and rhythm; No murmurs, rubs, or gallops.  EXTREMITIES: LE edema   PSYCH: Normal affect.  SKIN: No rashes or lesions.  -------------------------------------------------------------------------------------------  LABS:                          8.0    15.32 )-----------( 102      ( 19 Jan 2023 05:02 )             23.4     01-19    121<L>  |  89<L>  |  31<H>  ----------------------------<  230<H>  5.1   |  20<L>  |  2.24<H>    Ca    8.6      19 Jan 2023 16:01  Phos  4.6     01-19  Mg     1.70     01-19    TPro  6.4  /  Alb  3.4  /  TBili  0.7  /  DBili  x   /  AST  38<H>  /  ALT  38<H>  /  AlkPhos  306<H>  01-19      CARDIAC MARKERS ( 17 Jan 2023 00:50 )  27 ng/L / x     / x     / x     / x     / x      CARDIAC MARKERS ( 16 Jan 2023 20:15 )  26 ng/L / x     / x     / x     / x     / x                -------------------------------------------------------------------------------------------  Meds:  acetaminophen     Tablet .. 650 milliGRAM(s) Oral every 6 hours PRN  albuterol/ipratropium for Nebulization 3 milliLiter(s) Nebulizer every 6 hours  aluminum hydroxide/magnesium hydroxide/simethicone Suspension 30 milliLiter(s) Oral every 4 hours PRN  atorvastatin 40 milliGRAM(s) Oral at bedtime  buMETAnide Injectable 1 milliGRAM(s) IV Push every 12 hours  dextrose 5%. 1000 milliLiter(s) IV Continuous <Continuous>  dextrose 5%. 1000 milliLiter(s) IV Continuous <Continuous>  dextrose 50% Injectable 25 Gram(s) IV Push once  dextrose 50% Injectable 12.5 Gram(s) IV Push once  dextrose 50% Injectable 25 Gram(s) IV Push once  dextrose Oral Gel 15 Gram(s) Oral once PRN  glucagon  Injectable 1 milliGRAM(s) IntraMuscular once  guaiFENesin Oral Liquid (Sugar-Free) 200 milliGRAM(s) Oral every 6 hours PRN  heparin   Injectable 5000 Unit(s) SubCutaneous every 8 hours  influenza   Vaccine 0.5 milliLiter(s) IntraMuscular once  insulin glargine Injectable (LANTUS) 2 Unit(s) SubCutaneous at bedtime  insulin lispro (ADMELOG) corrective regimen sliding scale   SubCutaneous three times a day before meals  insulin lispro (ADMELOG) corrective regimen sliding scale   SubCutaneous at bedtime  insulin lispro Injectable (ADMELOG) 2 Unit(s) SubCutaneous three times a day before meals  levothyroxine Injectable 94 MICROGram(s) IV Push at bedtime  melatonin 3 milliGRAM(s) Oral at bedtime PRN  mupirocin 2% Ointment 1 Application(s) Topical three times a day  ondansetron Injectable 4 milliGRAM(s) IV Push every 8 hours PRN  pantoprazole  Injectable 40 milliGRAM(s) IV Push daily  piperacillin/tazobactam IVPB.. 3.375 Gram(s) IV Intermittent every 8 hours    -------------------------------------------------------------------------------------------  Cardiovascular Diagnostic Testing:    ECG:     Echo:     Stress Testing:    Cath:    Imaging:    CXR:  reviewed  -------------------------------------------------------------------------------------------  Assessment and Plan:   57F hypothyroidism (recent admissions for myxedema coma), moderate pHTN (TTE 11/22, unclear volume status at that time, normal LV/RV size and function), asthma, HTN, T2DM, hyperlipidemia, CKD who presents with SOB found to have AHRF and hyponatremia with worsening ISAMAR.     #AHRF   #Dyspnea   #Pulm HTN   Patient with moderate pulm HTN on echo and now decreased RV function. Has hx of pulm HTN moderate-severe in past of unclear etiology. CT chest showed bilateral pleural effusion and pulm edema with elevated BNP but clinically does not seem to be volume overloaded.   -Can continue bumex IV   -Plan for RHC today     #HTN  -Per primary team, holding given possible PNA/sepsis     Please check [email or web address] password: [email or web address] for cardiology service schedule and contact information.  Cardiology Progress Note  ------------------------------------------------------------------------------------------  SUBJECTIVE:   No events overnight.     Denies orthopnea but continues to report mild SOB, denies chest pain   -------------------------------------------------------------------------------------------  ROS:  CV: chest pain (-), palpitation (-), orthopnea (-), PND (-), edema (-)  PULM: SOB (+), cough (-), wheezing (-), hemoptysis (-).   CONST: fever (-), chills (-) or fatigue (-)  GI: abdominal distension (-), abdominal pain (-) , nausea/vomiting (-), hematemesis, (-), melena (-), hematochezia (-), diarrhea +  : dysuria (-), frequency (-), hematuria (-).   NEURO: numbness (-), weakness (-), dizziness (-)  MSK: myalgia (-), joint pain (-)   SKIN: itching (-), rash (-)  HEENT:  visual changes (-); vertigo or throat pain (-);  neck stiffness (-)   Psych: change in mood (-), anxiety (-), depression (-)     All other review of systems is negative unless indicated above.   -------------------------------------------------------------------------------------------  VS:  T(F): 97.1 (01-20), Max: 97.1 (01-19)  HR: 70 (01-20) (68 - 94)  BP: 141/83 (01-20) (131/40 - 150/84)  RR: 18 (01-20)  SpO2: 100% (01-20)  I&O's Summary    ------------------------------------------------------------------------------------------  PHYSICAL EXAM:  GENERAL: NAD  NECK:  No JVD.  CHEST/LUNG: Decreased breath sounds   HEART: Regular rate and rhythm; No murmurs, rubs, or gallops.  EXTREMITIES: LE edema   PSYCH: Normal affect.  SKIN: No rashes or lesions.  -------------------------------------------------------------------------------------------  LABS:                          8.0    15.32 )-----------( 102      ( 19 Jan 2023 05:02 )             23.4     01-19    121<L>  |  89<L>  |  31<H>  ----------------------------<  230<H>  5.1   |  20<L>  |  2.24<H>    Ca    8.6      19 Jan 2023 16:01  Phos  4.6     01-19  Mg     1.70     01-19    TPro  6.4  /  Alb  3.4  /  TBili  0.7  /  DBili  x   /  AST  38<H>  /  ALT  38<H>  /  AlkPhos  306<H>  01-19      CARDIAC MARKERS ( 17 Jan 2023 00:50 )  27 ng/L / x     / x     / x     / x     / x      CARDIAC MARKERS ( 16 Jan 2023 20:15 )  26 ng/L / x     / x     / x     / x     / x                -------------------------------------------------------------------------------------------  Meds:  acetaminophen     Tablet .. 650 milliGRAM(s) Oral every 6 hours PRN  albuterol/ipratropium for Nebulization 3 milliLiter(s) Nebulizer every 6 hours  aluminum hydroxide/magnesium hydroxide/simethicone Suspension 30 milliLiter(s) Oral every 4 hours PRN  atorvastatin 40 milliGRAM(s) Oral at bedtime  buMETAnide Injectable 1 milliGRAM(s) IV Push every 12 hours  dextrose 5%. 1000 milliLiter(s) IV Continuous <Continuous>  dextrose 5%. 1000 milliLiter(s) IV Continuous <Continuous>  dextrose 50% Injectable 25 Gram(s) IV Push once  dextrose 50% Injectable 12.5 Gram(s) IV Push once  dextrose 50% Injectable 25 Gram(s) IV Push once  dextrose Oral Gel 15 Gram(s) Oral once PRN  glucagon  Injectable 1 milliGRAM(s) IntraMuscular once  guaiFENesin Oral Liquid (Sugar-Free) 200 milliGRAM(s) Oral every 6 hours PRN  heparin   Injectable 5000 Unit(s) SubCutaneous every 8 hours  influenza   Vaccine 0.5 milliLiter(s) IntraMuscular once  insulin glargine Injectable (LANTUS) 2 Unit(s) SubCutaneous at bedtime  insulin lispro (ADMELOG) corrective regimen sliding scale   SubCutaneous three times a day before meals  insulin lispro (ADMELOG) corrective regimen sliding scale   SubCutaneous at bedtime  insulin lispro Injectable (ADMELOG) 2 Unit(s) SubCutaneous three times a day before meals  levothyroxine Injectable 94 MICROGram(s) IV Push at bedtime  melatonin 3 milliGRAM(s) Oral at bedtime PRN  mupirocin 2% Ointment 1 Application(s) Topical three times a day  ondansetron Injectable 4 milliGRAM(s) IV Push every 8 hours PRN  pantoprazole  Injectable 40 milliGRAM(s) IV Push daily  piperacillin/tazobactam IVPB.. 3.375 Gram(s) IV Intermittent every 8 hours    -------------------------------------------------------------------------------------------  Cardiovascular Diagnostic Testing:    ECG:     Echo:     Stress Testing:    Cath:    Imaging:    CXR:  reviewed  -------------------------------------------------------------------------------------------  Assessment and Plan:   57F hypothyroidism (recent admissions for myxedema coma), moderate pHTN (TTE 11/22, unclear volume status at that time, normal LV/RV size and function), asthma, HTN, T2DM, hyperlipidemia, CKD who presents with SOB found to have AHRF and hyponatremia with worsening ISAMAR.     #AHRF   #Dyspnea   #Pulm HTN   Patient with moderate pulm HTN on echo and now decreased RV function. Has hx of pulm HTN moderate-severe in past of unclear etiology. CT chest showed bilateral pleural effusion and pulm edema with elevated BNP but clinically does not seem to be volume overloaded.   -Hold bumex for today   -Plan for RHC today     #HTN  -Per primary team, holding given possible PNA/sepsis     Please check [email or web address] password: [email or web address]" for cardiology service schedule and contact information.  Cardiology Progress Note  ------------------------------------------------------------------------------------------  SUBJECTIVE:   No events overnight.     Denies orthopnea but continues to report mild SOB, denies chest pain   -------------------------------------------------------------------------------------------  ROS:  CV: chest pain (-), palpitation (-), orthopnea (-), PND (-), edema (-)  PULM: SOB (+), cough (-), wheezing (-), hemoptysis (-).   CONST: fever (-), chills (-) or fatigue (-)  GI: abdominal distension (-), abdominal pain (-) , nausea/vomiting (-), hematemesis, (-), melena (-), hematochezia (-), diarrhea +  : dysuria (-), frequency (-), hematuria (-).   NEURO: numbness (-), weakness (-), dizziness (-)  MSK: myalgia (-), joint pain (-)   SKIN: itching (-), rash (-)  HEENT:  visual changes (-); vertigo or throat pain (-);  neck stiffness (-)   Psych: change in mood (-), anxiety (-), depression (-)     All other review of systems is negative unless indicated above.   -------------------------------------------------------------------------------------------  VS:  T(F): 97.1 (01-20), Max: 97.1 (01-19)  HR: 70 (01-20) (68 - 94)  BP: 141/83 (01-20) (131/40 - 150/84)  RR: 18 (01-20)  SpO2: 100% (01-20)  I&O's Summary    ------------------------------------------------------------------------------------------  PHYSICAL EXAM:  GENERAL: NAD  NECK:  No JVD.  CHEST/LUNG: Decreased breath sounds   HEART: Regular rate and rhythm; No murmurs, rubs, or gallops.  EXTREMITIES: LE edema   PSYCH: Normal affect.  SKIN: No rashes or lesions.  -------------------------------------------------------------------------------------------  LABS:                          8.0    15.32 )-----------( 102      ( 19 Jan 2023 05:02 )             23.4     01-19    121<L>  |  89<L>  |  31<H>  ----------------------------<  230<H>  5.1   |  20<L>  |  2.24<H>    Ca    8.6      19 Jan 2023 16:01  Phos  4.6     01-19  Mg     1.70     01-19    TPro  6.4  /  Alb  3.4  /  TBili  0.7  /  DBili  x   /  AST  38<H>  /  ALT  38<H>  /  AlkPhos  306<H>  01-19      CARDIAC MARKERS ( 17 Jan 2023 00:50 )  27 ng/L / x     / x     / x     / x     / x      CARDIAC MARKERS ( 16 Jan 2023 20:15 )  26 ng/L / x     / x     / x     / x     / x                -------------------------------------------------------------------------------------------  Meds:  acetaminophen     Tablet .. 650 milliGRAM(s) Oral every 6 hours PRN  albuterol/ipratropium for Nebulization 3 milliLiter(s) Nebulizer every 6 hours  aluminum hydroxide/magnesium hydroxide/simethicone Suspension 30 milliLiter(s) Oral every 4 hours PRN  atorvastatin 40 milliGRAM(s) Oral at bedtime  buMETAnide Injectable 1 milliGRAM(s) IV Push every 12 hours  dextrose 5%. 1000 milliLiter(s) IV Continuous <Continuous>  dextrose 5%. 1000 milliLiter(s) IV Continuous <Continuous>  dextrose 50% Injectable 25 Gram(s) IV Push once  dextrose 50% Injectable 12.5 Gram(s) IV Push once  dextrose 50% Injectable 25 Gram(s) IV Push once  dextrose Oral Gel 15 Gram(s) Oral once PRN  glucagon  Injectable 1 milliGRAM(s) IntraMuscular once  guaiFENesin Oral Liquid (Sugar-Free) 200 milliGRAM(s) Oral every 6 hours PRN  heparin   Injectable 5000 Unit(s) SubCutaneous every 8 hours  influenza   Vaccine 0.5 milliLiter(s) IntraMuscular once  insulin glargine Injectable (LANTUS) 2 Unit(s) SubCutaneous at bedtime  insulin lispro (ADMELOG) corrective regimen sliding scale   SubCutaneous three times a day before meals  insulin lispro (ADMELOG) corrective regimen sliding scale   SubCutaneous at bedtime  insulin lispro Injectable (ADMELOG) 2 Unit(s) SubCutaneous three times a day before meals  levothyroxine Injectable 94 MICROGram(s) IV Push at bedtime  melatonin 3 milliGRAM(s) Oral at bedtime PRN  mupirocin 2% Ointment 1 Application(s) Topical three times a day  ondansetron Injectable 4 milliGRAM(s) IV Push every 8 hours PRN  pantoprazole  Injectable 40 milliGRAM(s) IV Push daily  piperacillin/tazobactam IVPB.. 3.375 Gram(s) IV Intermittent every 8 hours    -------------------------------------------------------------------------------------------  Cardiovascular Diagnostic Testing:    ECG:     Echo:     Stress Testing:    Cath:    Imaging:    CXR:  reviewed  -------------------------------------------------------------------------------------------  Assessment and Plan:   57F hypothyroidism (recent admissions for myxedema coma), moderate pHTN (TTE 11/22, unclear volume status at that time, normal LV/RV size and function), asthma, HTN, T2DM, hyperlipidemia, CKD who presents with SOB found to have AHRF and hyponatremia with worsening ISAMAR.     #AHRF   #Dyspnea   #Pulm HTN   Patient with moderate pulm HTN on echo and now decreased RV function. Has hx of pulm HTN moderate-severe in past of unclear etiology. CT chest showed bilateral pleural effusion and pulm edema with elevated BNP but clinically does not seem to be volume overloaded.   -Hold bumex for today   -Plan for RHC today   -Strict I/O   -Daily weights     #HTN  -Per primary team, holding given possible PNA/sepsis     Please check [email or web address] password: [email or web address] for cardiology service schedule and contact information.  Cardiology Progress Note  ------------------------------------------------------------------------------------------  SUBJECTIVE:   No events overnight.     Denies orthopnea but continues to report mild SOB, denies chest pain   -------------------------------------------------------------------------------------------  ROS:  CV: chest pain (-), palpitation (-), orthopnea (-), PND (-), edema (-)  PULM: SOB (+), cough (-), wheezing (-), hemoptysis (-).   CONST: fever (-), chills (-) or fatigue (-)  GI: abdominal distension (-), abdominal pain (-) , nausea/vomiting (-), hematemesis, (-), melena (-), hematochezia (-), diarrhea +  : dysuria (-), frequency (-), hematuria (-).   NEURO: numbness (-), weakness (-), dizziness (-)  MSK: myalgia (-), joint pain (-)   SKIN: itching (-), rash (-)  HEENT:  visual changes (-); vertigo or throat pain (-);  neck stiffness (-)   Psych: change in mood (-), anxiety (-), depression (-)     All other review of systems is negative unless indicated above.   -------------------------------------------------------------------------------------------  VS:  T(F): 97.1 (01-20), Max: 97.1 (01-19)  HR: 70 (01-20) (68 - 94)  BP: 141/83 (01-20) (131/40 - 150/84)  RR: 18 (01-20)  SpO2: 100% (01-20)  I&O's Summary    ------------------------------------------------------------------------------------------  PHYSICAL EXAM:  GENERAL: NAD  NECK:  No JVD.  CHEST/LUNG: Decreased breath sounds   HEART: Regular rate and rhythm; No murmurs, rubs, or gallops.  EXTREMITIES: LE edema   PSYCH: Normal affect.  SKIN: No rashes or lesions.  -------------------------------------------------------------------------------------------  LABS:                          8.0    15.32 )-----------( 102      ( 19 Jan 2023 05:02 )             23.4     01-19    121<L>  |  89<L>  |  31<H>  ----------------------------<  230<H>  5.1   |  20<L>  |  2.24<H>    Ca    8.6      19 Jan 2023 16:01  Phos  4.6     01-19  Mg     1.70     01-19    TPro  6.4  /  Alb  3.4  /  TBili  0.7  /  DBili  x   /  AST  38<H>  /  ALT  38<H>  /  AlkPhos  306<H>  01-19      CARDIAC MARKERS ( 17 Jan 2023 00:50 )  27 ng/L / x     / x     / x     / x     / x      CARDIAC MARKERS ( 16 Jan 2023 20:15 )  26 ng/L / x     / x     / x     / x     / x                -------------------------------------------------------------------------------------------  Meds:  acetaminophen     Tablet .. 650 milliGRAM(s) Oral every 6 hours PRN  albuterol/ipratropium for Nebulization 3 milliLiter(s) Nebulizer every 6 hours  aluminum hydroxide/magnesium hydroxide/simethicone Suspension 30 milliLiter(s) Oral every 4 hours PRN  atorvastatin 40 milliGRAM(s) Oral at bedtime  buMETAnide Injectable 1 milliGRAM(s) IV Push every 12 hours  dextrose 5%. 1000 milliLiter(s) IV Continuous <Continuous>  dextrose 5%. 1000 milliLiter(s) IV Continuous <Continuous>  dextrose 50% Injectable 25 Gram(s) IV Push once  dextrose 50% Injectable 12.5 Gram(s) IV Push once  dextrose 50% Injectable 25 Gram(s) IV Push once  dextrose Oral Gel 15 Gram(s) Oral once PRN  glucagon  Injectable 1 milliGRAM(s) IntraMuscular once  guaiFENesin Oral Liquid (Sugar-Free) 200 milliGRAM(s) Oral every 6 hours PRN  heparin   Injectable 5000 Unit(s) SubCutaneous every 8 hours  influenza   Vaccine 0.5 milliLiter(s) IntraMuscular once  insulin glargine Injectable (LANTUS) 2 Unit(s) SubCutaneous at bedtime  insulin lispro (ADMELOG) corrective regimen sliding scale   SubCutaneous three times a day before meals  insulin lispro (ADMELOG) corrective regimen sliding scale   SubCutaneous at bedtime  insulin lispro Injectable (ADMELOG) 2 Unit(s) SubCutaneous three times a day before meals  levothyroxine Injectable 94 MICROGram(s) IV Push at bedtime  melatonin 3 milliGRAM(s) Oral at bedtime PRN  mupirocin 2% Ointment 1 Application(s) Topical three times a day  ondansetron Injectable 4 milliGRAM(s) IV Push every 8 hours PRN  pantoprazole  Injectable 40 milliGRAM(s) IV Push daily  piperacillin/tazobactam IVPB.. 3.375 Gram(s) IV Intermittent every 8 hours    -------------------------------------------------------------------------------------------  Cardiovascular Diagnostic Testing:    ECG:     Echo:     Stress Testing:    Cath:    Imaging:    CXR:  reviewed  -------------------------------------------------------------------------------------------  Assessment and Plan:   57F hypothyroidism (recent admissions for myxedema coma), moderate pHTN (TTE 11/22, unclear volume status at that time, normal LV/RV size and function), asthma, HTN, T2DM, hyperlipidemia, CKD who presents with SOB found to have AHRF and hyponatremia with worsening ISAMAR.     #AHRF   #Dyspnea   #Pulm HTN   Patient with moderate pulm HTN on echo and now decreased RV function. Has hx of pulm HTN moderate-severe in past of unclear etiology. CT chest showed bilateral pleural effusion and pulm edema with elevated BNP but clinically does not seem to be volume overloaded.   -Continue diuresis per renal's discretion   -RHC on hold today given plt drop and metabolic derangements, will reassess next week once patient is optimized   -Strict I/O   -Daily weights     #HTN  -Per primary team, holding given possible PNA/sepsis     Please check [email or web address] password: [email or web address]" for cardiology service schedule and contact information.

## 2023-01-20 NOTE — CONSULT NOTE ADULT - SUBJECTIVE AND OBJECTIVE BOX
HEMATOLOGY ONCOLOGY CONSULT     Patient is a 57y old  Female who presents with a chief complaint of Respiratory distress (20 Jan 2023 13:44)      HPI:  56 yo lady w/ hypothyroidism, HTN, T2DM, HLD, CKD, PAH, asthma on albuterol presented to the ED w/respiratory distress.  As per family, the patient was recently admitted at [facility] for myxedema coma, delirium, multiorgan failure patient was intubated at that time, received vasopressors, and broad-spectrum antibiotics as per dc paperwork. After seeing her primary care Dr. Fernandez yesterday she began to experience shortness of breath, which was not alleviated by her albuterol. Denies headache, focal weakness, chest pain, nausea, vomiting, diarrhea, abdominal pain, urinary symptoms, stool symptoms, skin changes.  Denies blood thinner use. As per daughter, there has been decreased PO intake since her last admission. Most information was obtained from the patient's daughter Brittany .     PAST MEDICAL & SURGICAL HISTORY:  HTN (hypertension)      HLD (hyperlipidemia)      DM (diabetes mellitus)      Hypothyroid      H/O pulmonary hypertension      CKD (chronic kidney disease)      No significant past surgical history          Review of Systems:   CONSTITUTIONAL: No fever, weight loss  EYES: No eye pain, visual disturbances, or discharge  ENMT:  No difficulty hearing, tinnitus, vertigo; No sinus or throat pain  NECK: No pain or stiffness  RESPIRATORY: Respiratory distress  CARDIOVASCULAR: No chest pain, palpitations, dizziness, or leg swelling  GASTROINTESTINAL: No abdominal or epigastric pain. No nausea, vomiting, or hematemesis; No diarrhea or constipation. No melena or hematochezia.  GENITOURINARY: No dysuria, frequency, hematuria, or incontinence  NEUROLOGICAL: No headaches, memory loss, loss of strength, numbness, or tremors  SKIN: No itching, burning, rashes, or lesions   LYMPH NODES: No enlarged glands  ENDOCRINE: No heat or cold intolerance; No hair loss  MUSCULOSKELETAL: No joint pain or swelling; No muscle, back, or extremity pain  PSYCHIATRIC: No depression, anxiety, mood swings, or difficulty sleeping  HEME/LYMPH: No easy bruising, or bleeding gums  ALLERGY AND IMMUNOLOGIC: No hives or eczema    Allergies    No Known Allergies    Intolerances        Social History: No recent alcohol/tobacco or drug use    FAMILY HISTORY: No pertinent family history       MEDICATIONS  (STANDING):  atorvastatin 40 milliGRAM(s) Oral at bedtime  dextrose 5%. 1000 milliLiter(s) (50 mL/Hr) IV Continuous <Continuous>  dextrose 5%. 1000 milliLiter(s) (100 mL/Hr) IV Continuous <Continuous>  dextrose 50% Injectable 25 Gram(s) IV Push once  dextrose 50% Injectable 12.5 Gram(s) IV Push once  dextrose 50% Injectable 25 Gram(s) IV Push once  glucagon  Injectable 1 milliGRAM(s) IntraMuscular once  insulin lispro (ADMELOG) corrective regimen sliding scale   SubCutaneous every 6 hours  levothyroxine 125 MICROGram(s) Oral daily    MEDICATIONS  (PRN):  dextrose Oral Gel 15 Gram(s) Oral once PRN Blood Glucose LESS THAN 70 milliGRAM(s)/deciliter      T(C): 36.6 (01-17-23 @ 03:41), Max: 37.1 (01-16-23 @ 18:21)  HR: 71 (01-17-23 @ 06:23) (71 - 86)  BP: 157/87 (01-17-23 @ 06:23) (118/81 - 174/86)  RR: 20 (01-17-23 @ 06:23) (17 - 20)  SpO2: 100% (01-17-23 @ 06:23) (100% - 100%)  Height (cm): 152.4 (01-16-23 @ 18:21)  CAPILLARY BLOOD GLUCOSE      POCT Blood Glucose.: 196 mg/dL (16 Jan 2023 18:27)    I&O's Summary      PHYSICAL EXAM:  GENERAL: NAD, well-developed, on nasal canula  HEAD:  Atraumatic, Normocephalic  EYES: conjunctiva and sclera clear  NECK: Supple, No elevated JVD  CHEST/LUNG: Clear to auscultation bilaterally; No wheeze  HEART: Regular rate and rhythm; No murmurs, rubs, or gallops  ABDOMEN: Soft, Nontender, Nondistended; Bowel sounds present  EXTREMITIES:  2+ Peripheral Pulses, No clubbing, cyanosis, or edema  PSYCH: AAOx3, though somewhat slow to respond,   NEUROLOGY: CN II-XII grossly intact, moving all extremities  SKIN: No rashes or lesions    LABS:                        6.5    6.52  )-----------( x        ( 17 Jan 2023 07:10 )             19.5     01-17    120<LL>  |  89<L>  |  23  ----------------------------<  221<H>  4.8   |  17<L>  |  1.17    Ca    9.0      17 Jan 2023 07:10  Phos  4.5     01-17  Mg     1.80     01-17    TPro  6.4  /  Alb  3.4  /  TBili  0.3  /  DBili  x   /  AST  30  /  ALT  31  /  AlkPhos  276<H>  01-17    PT/INR - ( 16 Jan 2023 20:15 )   PT: 11.9 sec;   INR: 1.03 ratio         PTT - ( 16 Jan 2023 20:15 )  PTT:37.3 sec            RADIOLOGY & ADDITIONAL TESTS:    ECG Personally Reviewed -     Imaging Personally Reviewed:    Consultant(s) Notes Reviewed:      Care Discussed with Consultants/Other Providers:   Discussed with MICU team and endocrine teams  Discharge paper work from [facility] reviewed (17 Jan 2023 08:51)       ROS:  Negative except for:    PAST MEDICAL & SURGICAL HISTORY:  HTN (hypertension)      HLD (hyperlipidemia)      DM (diabetes mellitus)      Hypothyroid      H/O pulmonary hypertension      CKD (chronic kidney disease)      No significant past surgical history          SOCIAL HISTORY:    FAMILY HISTORY:      MEDICATIONS  (STANDING):  albuterol/ipratropium for Nebulization 3 milliLiter(s) Nebulizer every 6 hours  buMETAnide Injectable 2 milliGRAM(s) IV Push every 12 hours  dextrose 5%. 1000 milliLiter(s) (50 mL/Hr) IV Continuous <Continuous>  dextrose 5%. 1000 milliLiter(s) (100 mL/Hr) IV Continuous <Continuous>  dextrose 50% Injectable 25 Gram(s) IV Push once  dextrose 50% Injectable 12.5 Gram(s) IV Push once  dextrose 50% Injectable 25 Gram(s) IV Push once  glucagon  Injectable 1 milliGRAM(s) IntraMuscular once  heparin   Injectable 5000 Unit(s) SubCutaneous every 8 hours  influenza   Vaccine 0.5 milliLiter(s) IntraMuscular once  insulin glargine Injectable (LANTUS) 2 Unit(s) SubCutaneous at bedtime  insulin lispro (ADMELOG) corrective regimen sliding scale   SubCutaneous three times a day before meals  insulin lispro (ADMELOG) corrective regimen sliding scale   SubCutaneous at bedtime  insulin lispro Injectable (ADMELOG) 2 Unit(s) SubCutaneous three times a day before meals  levothyroxine Injectable 94 MICROGram(s) IV Push at bedtime  mupirocin 2% Ointment 1 Application(s) Topical three times a day  pantoprazole  Injectable 40 milliGRAM(s) IV Push daily  piperacillin/tazobactam IVPB.. 3.375 Gram(s) IV Intermittent every 12 hours    MEDICATIONS  (PRN):  acetaminophen     Tablet .. 650 milliGRAM(s) Oral every 6 hours PRN Temp greater or equal to 38C (100.4F), Mild Pain (1 - 3)  aluminum hydroxide/magnesium hydroxide/simethicone Suspension 30 milliLiter(s) Oral every 4 hours PRN Dyspepsia  dextrose Oral Gel 15 Gram(s) Oral once PRN Blood Glucose LESS THAN 70 milliGRAM(s)/deciliter  guaiFENesin Oral Liquid (Sugar-Free) 200 milliGRAM(s) Oral every 6 hours PRN Cough  melatonin 3 milliGRAM(s) Oral at bedtime PRN Insomnia  ondansetron Injectable 4 milliGRAM(s) IV Push every 8 hours PRN Nausea and/or Vomiting      Allergies    No Known Allergies    Intolerances        Vital Signs Last 24 Hrs  T(C): 36.2 (20 Jan 2023 05:00), Max: 36.2 (20 Jan 2023 05:00)  T(F): 97.1 (20 Jan 2023 05:00), Max: 97.1 (20 Jan 2023 05:00)  HR: 70 (20 Jan 2023 05:00) (68 - 94)  BP: 141/83 (20 Jan 2023 05:00) (131/40 - 141/83)  BP(mean): --  RR: 18 (20 Jan 2023 05:00) (18 - 18)  SpO2: 100% (20 Jan 2023 05:00) (95% - 100%)    Parameters below as of 20 Jan 2023 05:00  Patient On (Oxygen Delivery Method): nasal cannula  O2 Flow (L/min): 4      PHYSICAL EXAM  General: adult in NAD  HEENT: clear oropharynx, anicteric sclera, pink conjunctiva  Neck: supple  CV: normal S1/S2 with no murmur rubs or gallops  Lungs: positive air movement b/l ant lungs,clear to auscultation, no wheezes, no rales  Abdomen: soft non-tender non-distended, no hepatosplenomegaly  Ext: no clubbing cyanosis or edema  Skin: no rashes and no petechiae  Neuro: alert and oriented X 4, no focal deficits      LABS:                          7.4    8.86  )-----------( 61       ( 20 Jan 2023 12:10 )             22.1         Mean Cell Volume : 78.1 fL  Mean Cell Hemoglobin : 26.1 pg  Mean Cell Hemoglobin Concentration : 33.5 gm/dL  Auto Neutrophil # : x  Auto Lymphocyte # : x  Auto Monocyte # : x  Auto Eosinophil # : x  Auto Basophil # : x  Auto Neutrophil % : x  Auto Lymphocyte % : x  Auto Monocyte % : x  Auto Eosinophil % : x  Auto Basophil % : x      01-20    120<LL>  |  87<L>  |  36<H>  ----------------------------<  159<H>  5.0   |  19<L>  |  2.67<H>    Ca    8.6      20 Jan 2023 12:10  Phos  4.6     01-20  Mg     2.20     01-20    TPro  6.4  /  Alb  3.2<L>  /  TBili  1.1  /  DBili  x   /  AST  82<H>  /  ALT  75<H>  /  AlkPhos  405<H>  01-20          Reticulocyte Percent: 2.1 % (01-19 @ 05:02)  Reticulocyte Percent: 2.0 % (01-18 @ 03:50)  Ferritin, Serum: 729 ng/mL (01-17 @ 07:10)  Iron - Total Binding Capacity.: 306 ug/dL (01-17 @ 07:10)      Serum Protein Electrophoresis Interp: Increased Beta fraction, possibly transferrin increase. HUMPHREY Mccrary MD (01-19 @ 05:02)  VEE Kappa: 8.92 mg/dL (01-19 @ 05:02)  VEE Lambda: 3.79 mg/dL (01-19 @ 05:02)          BLOOD SMEAR INTERPRETATION:       RADIOLOGY & ADDITIONAL STUDIES:       HEMATOLOGY ONCOLOGY CONSULT     Patient is a 57y old  Female who presents with a chief complaint of Respiratory distress (20 Jan 2023 13:44)      HPI:  56 yo F w/ hypothyroidism, HTN, T2DM, HLD, CKD, PAH, asthma on albuterol presented to the ED w/respiratory distress.  As per family, the patient was recently admitted at [facility] for myxedema coma, delirium, multiorgan failure patient was intubated at that time, received vasopressors, and broad-spectrum antibiotics as per dc paperwork. After seeing her primary care Dr. Fernandez yesterday she began to experience shortness of breath, which was not alleviated by her albuterol. Denies headache, focal weakness, chest pain, nausea, vomiting, diarrhea, abdominal pain, urinary symptoms, stool symptoms, skin changes.  Denies blood thinner use. As per daughter, there has been decreased PO intake since her last admission. Most information was obtained from the patient's daughter Brittany Oconnell .     PAST MEDICAL & SURGICAL HISTORY:  HTN (hypertension)      HLD (hyperlipidemia)      DM (diabetes mellitus)      Hypothyroid      H/O pulmonary hypertension      CKD (chronic kidney disease)      No significant past surgical history          Review of Systems:   CONSTITUTIONAL: No fever, weight loss  EYES: No eye pain, visual disturbances, or discharge  ENMT:  No difficulty hearing, tinnitus, vertigo; No sinus or throat pain  NECK: No pain or stiffness  RESPIRATORY: Respiratory distress  CARDIOVASCULAR: No chest pain, palpitations, dizziness, or leg swelling  GASTROINTESTINAL: No abdominal or epigastric pain. No nausea, vomiting, or hematemesis; No diarrhea or constipation. No melena or hematochezia.  GENITOURINARY: No dysuria, frequency, hematuria, or incontinence  NEUROLOGICAL: No headaches, memory loss, loss of strength, numbness, or tremors  SKIN: No itching, burning, rashes, or lesions   LYMPH NODES: No enlarged glands  ENDOCRINE: No heat or cold intolerance; No hair loss  MUSCULOSKELETAL: No joint pain or swelling; No muscle, back, or extremity pain  PSYCHIATRIC: No depression, anxiety, mood swings, or difficulty sleeping  HEME/LYMPH: No easy bruising, or bleeding gums  ALLERGY AND IMMUNOLOGIC: No hives or eczema    Allergies    No Known Allergies    Intolerances        Social History: No recent alcohol/tobacco or drug use    FAMILY HISTORY: No pertinent family history       MEDICATIONS  (STANDING):  atorvastatin 40 milliGRAM(s) Oral at bedtime  dextrose 5%. 1000 milliLiter(s) (50 mL/Hr) IV Continuous <Continuous>  dextrose 5%. 1000 milliLiter(s) (100 mL/Hr) IV Continuous <Continuous>  dextrose 50% Injectable 25 Gram(s) IV Push once  dextrose 50% Injectable 12.5 Gram(s) IV Push once  dextrose 50% Injectable 25 Gram(s) IV Push once  glucagon  Injectable 1 milliGRAM(s) IntraMuscular once  insulin lispro (ADMELOG) corrective regimen sliding scale   SubCutaneous every 6 hours  levothyroxine 125 MICROGram(s) Oral daily    MEDICATIONS  (PRN):  dextrose Oral Gel 15 Gram(s) Oral once PRN Blood Glucose LESS THAN 70 milliGRAM(s)/deciliter      T(C): 36.6 (01-17-23 @ 03:41), Max: 37.1 (01-16-23 @ 18:21)  HR: 71 (01-17-23 @ 06:23) (71 - 86)  BP: 157/87 (01-17-23 @ 06:23) (118/81 - 174/86)  RR: 20 (01-17-23 @ 06:23) (17 - 20)  SpO2: 100% (01-17-23 @ 06:23) (100% - 100%)  Height (cm): 152.4 (01-16-23 @ 18:21)  CAPILLARY BLOOD GLUCOSE      POCT Blood Glucose.: 196 mg/dL (16 Jan 2023 18:27)    I&O's Summary      PHYSICAL EXAM:  GENERAL: NAD, well-developed, on nasal canula  HEAD:  Atraumatic, Normocephalic  EYES: conjunctiva and sclera clear  NECK: Supple, No elevated JVD  CHEST/LUNG: Clear to auscultation bilaterally; No wheeze  HEART: Regular rate and rhythm; No murmurs, rubs, or gallops  ABDOMEN: Soft, Nontender, Nondistended; Bowel sounds present  EXTREMITIES:  2+ Peripheral Pulses, No clubbing, cyanosis, or edema  PSYCH: AAOx3, though somewhat slow to respond,   NEUROLOGY: CN II-XII grossly intact, moving all extremities  SKIN: No rashes or lesions    LABS:                        6.5    6.52  )-----------( x        ( 17 Jan 2023 07:10 )             19.5     01-17    120<LL>  |  89<L>  |  23  ----------------------------<  221<H>  4.8   |  17<L>  |  1.17    Ca    9.0      17 Jan 2023 07:10  Phos  4.5     01-17  Mg     1.80     01-17    TPro  6.4  /  Alb  3.4  /  TBili  0.3  /  DBili  x   /  AST  30  /  ALT  31  /  AlkPhos  276<H>  01-17    PT/INR - ( 16 Jan 2023 20:15 )   PT: 11.9 sec;   INR: 1.03 ratio         PTT - ( 16 Jan 2023 20:15 )  PTT:37.3 sec            RADIOLOGY & ADDITIONAL TESTS:    ECG Personally Reviewed -     Imaging Personally Reviewed:    Consultant(s) Notes Reviewed:      Care Discussed with Consultants/Other Providers:   Discussed with MICU team and endocrine teams  Discharge paper work from [facility] reviewed (17 Jan 2023 08:51)       ROS:  Negative except for:    PAST MEDICAL & SURGICAL HISTORY:  HTN (hypertension)      HLD (hyperlipidemia)      DM (diabetes mellitus)      Hypothyroid      H/O pulmonary hypertension      CKD (chronic kidney disease)      No significant past surgical history          SOCIAL HISTORY:    FAMILY HISTORY:      MEDICATIONS  (STANDING):  albuterol/ipratropium for Nebulization 3 milliLiter(s) Nebulizer every 6 hours  buMETAnide Injectable 2 milliGRAM(s) IV Push every 12 hours  dextrose 5%. 1000 milliLiter(s) (50 mL/Hr) IV Continuous <Continuous>  dextrose 5%. 1000 milliLiter(s) (100 mL/Hr) IV Continuous <Continuous>  dextrose 50% Injectable 25 Gram(s) IV Push once  dextrose 50% Injectable 12.5 Gram(s) IV Push once  dextrose 50% Injectable 25 Gram(s) IV Push once  glucagon  Injectable 1 milliGRAM(s) IntraMuscular once  heparin   Injectable 5000 Unit(s) SubCutaneous every 8 hours  influenza   Vaccine 0.5 milliLiter(s) IntraMuscular once  insulin glargine Injectable (LANTUS) 2 Unit(s) SubCutaneous at bedtime  insulin lispro (ADMELOG) corrective regimen sliding scale   SubCutaneous three times a day before meals  insulin lispro (ADMELOG) corrective regimen sliding scale   SubCutaneous at bedtime  insulin lispro Injectable (ADMELOG) 2 Unit(s) SubCutaneous three times a day before meals  levothyroxine Injectable 94 MICROGram(s) IV Push at bedtime  mupirocin 2% Ointment 1 Application(s) Topical three times a day  pantoprazole  Injectable 40 milliGRAM(s) IV Push daily  piperacillin/tazobactam IVPB.. 3.375 Gram(s) IV Intermittent every 12 hours    MEDICATIONS  (PRN):  acetaminophen     Tablet .. 650 milliGRAM(s) Oral every 6 hours PRN Temp greater or equal to 38C (100.4F), Mild Pain (1 - 3)  aluminum hydroxide/magnesium hydroxide/simethicone Suspension 30 milliLiter(s) Oral every 4 hours PRN Dyspepsia  dextrose Oral Gel 15 Gram(s) Oral once PRN Blood Glucose LESS THAN 70 milliGRAM(s)/deciliter  guaiFENesin Oral Liquid (Sugar-Free) 200 milliGRAM(s) Oral every 6 hours PRN Cough  melatonin 3 milliGRAM(s) Oral at bedtime PRN Insomnia  ondansetron Injectable 4 milliGRAM(s) IV Push every 8 hours PRN Nausea and/or Vomiting      Allergies    No Known Allergies    Intolerances        Vital Signs Last 24 Hrs  T(C): 36.2 (20 Jan 2023 05:00), Max: 36.2 (20 Jan 2023 05:00)  T(F): 97.1 (20 Jan 2023 05:00), Max: 97.1 (20 Jan 2023 05:00)  HR: 70 (20 Jan 2023 05:00) (68 - 94)  BP: 141/83 (20 Jan 2023 05:00) (131/40 - 141/83)  BP(mean): --  RR: 18 (20 Jan 2023 05:00) (18 - 18)  SpO2: 100% (20 Jan 2023 05:00) (95% - 100%)    Parameters below as of 20 Jan 2023 05:00  Patient On (Oxygen Delivery Method): nasal cannula  O2 Flow (L/min): 4      PHYSICAL EXAM  General: adult in NAD  HEENT: clear oropharynx, anicteric sclera, pink conjunctiva  Neck: supple  CV: normal S1/S2 with no murmur rubs or gallops  Lungs: positive air movement b/l ant lungs,clear to auscultation, no wheezes, no rales  Abdomen: soft non-tender non-distended, no hepatosplenomegaly  Ext: no clubbing cyanosis or edema  Skin: no rashes and no petechiae  Neuro: alert and oriented X 4, no focal deficits      LABS:                          7.4    8.86  )-----------( 61       ( 20 Jan 2023 12:10 )             22.1         Mean Cell Volume : 78.1 fL  Mean Cell Hemoglobin : 26.1 pg  Mean Cell Hemoglobin Concentration : 33.5 gm/dL  Auto Neutrophil # : x  Auto Lymphocyte # : x  Auto Monocyte # : x  Auto Eosinophil # : x  Auto Basophil # : x  Auto Neutrophil % : x  Auto Lymphocyte % : x  Auto Monocyte % : x  Auto Eosinophil % : x  Auto Basophil % : x      01-20    120<LL>  |  87<L>  |  36<H>  ----------------------------<  159<H>  5.0   |  19<L>  |  2.67<H>    Ca    8.6      20 Jan 2023 12:10  Phos  4.6     01-20  Mg     2.20     01-20    TPro  6.4  /  Alb  3.2<L>  /  TBili  1.1  /  DBili  x   /  AST  82<H>  /  ALT  75<H>  /  AlkPhos  405<H>  01-20          Reticulocyte Percent: 2.1 % (01-19 @ 05:02)  Reticulocyte Percent: 2.0 % (01-18 @ 03:50)  Ferritin, Serum: 729 ng/mL (01-17 @ 07:10)  Iron - Total Binding Capacity.: 306 ug/dL (01-17 @ 07:10)      Serum Protein Electrophoresis Interp: Increased Beta fraction, possibly transferrin increase. HUMPHREY Mccrary MD (01-19 @ 05:02)  VEE Kappa: 8.92 mg/dL (01-19 @ 05:02)  VEE Lambda: 3.79 mg/dL (01-19 @ 05:02)          BLOOD SMEAR INTERPRETATION:       RADIOLOGY & ADDITIONAL STUDIES:

## 2023-01-20 NOTE — CHART NOTE - NSCHARTNOTEFT_GEN_A_CORE
Evening BMP shows Na 118 and potassium of 5.5. Nephrology called overnight. Will f/u recs.    Vicki Henson PA-C  pager [phone]

## 2023-01-20 NOTE — PROGRESS NOTE ADULT - PROBLEM SELECTOR PLAN 2
In the setting of ISAMAR. Serum potassium is elevated at 5.8(non hemolyzed) today. Received medical management- insulin with D50 and lokelma. Also received Bumex 1 mg IV . Repeat BMP in 4 hrs. Low potassium diet. Monitor serum potassium.

## 2023-01-21 LAB
ALBUMIN SERPL ELPH-MCNC: 3.2 G/DL — LOW (ref 3.3–5)
ALP SERPL-CCNC: 432 U/L — HIGH (ref 40–120)
ALT FLD-CCNC: 84 U/L — HIGH (ref 4–33)
ANA TITR SER: NEGATIVE — SIGNIFICANT CHANGE UP
ANION GAP SERPL CALC-SCNC: 15 MMOL/L — HIGH (ref 7–14)
ANION GAP SERPL CALC-SCNC: 16 MMOL/L — HIGH (ref 7–14)
ANION GAP SERPL CALC-SCNC: 17 MMOL/L — HIGH (ref 7–14)
AST SERPL-CCNC: 85 U/L — HIGH (ref 4–32)
BILIRUB SERPL-MCNC: 1.1 MG/DL — SIGNIFICANT CHANGE UP (ref 0.2–1.2)
BUN SERPL-MCNC: 39 MG/DL — HIGH (ref 7–23)
BUN SERPL-MCNC: 40 MG/DL — HIGH (ref 7–23)
BUN SERPL-MCNC: 40 MG/DL — HIGH (ref 7–23)
CALCIUM SERPL-MCNC: 8.7 MG/DL — SIGNIFICANT CHANGE UP (ref 8.4–10.5)
CALCIUM SERPL-MCNC: 8.7 MG/DL — SIGNIFICANT CHANGE UP (ref 8.4–10.5)
CALCIUM SERPL-MCNC: 8.8 MG/DL — SIGNIFICANT CHANGE UP (ref 8.4–10.5)
CHLORIDE SERPL-SCNC: 89 MMOL/L — LOW (ref 98–107)
CHLORIDE SERPL-SCNC: 89 MMOL/L — LOW (ref 98–107)
CHLORIDE SERPL-SCNC: 90 MMOL/L — LOW (ref 98–107)
CO2 SERPL-SCNC: 16 MMOL/L — LOW (ref 22–31)
CO2 SERPL-SCNC: 16 MMOL/L — LOW (ref 22–31)
CO2 SERPL-SCNC: 17 MMOL/L — LOW (ref 22–31)
CREAT SERPL-MCNC: 2.87 MG/DL — HIGH (ref 0.5–1.3)
CREAT SERPL-MCNC: 2.9 MG/DL — HIGH (ref 0.5–1.3)
CREAT SERPL-MCNC: 3.19 MG/DL — HIGH (ref 0.5–1.3)
EGFR: 16 ML/MIN/1.73M2 — LOW
EGFR: 18 ML/MIN/1.73M2 — LOW
EGFR: 19 ML/MIN/1.73M2 — LOW
GLUCOSE BLDC GLUCOMTR-MCNC: 150 MG/DL — HIGH (ref 70–99)
GLUCOSE BLDC GLUCOMTR-MCNC: 152 MG/DL — HIGH (ref 70–99)
GLUCOSE BLDC GLUCOMTR-MCNC: 171 MG/DL — HIGH (ref 70–99)
GLUCOSE BLDC GLUCOMTR-MCNC: 196 MG/DL — HIGH (ref 70–99)
GLUCOSE BLDC GLUCOMTR-MCNC: 82 MG/DL — SIGNIFICANT CHANGE UP (ref 70–99)
GLUCOSE BLDC GLUCOMTR-MCNC: 88 MG/DL — SIGNIFICANT CHANGE UP (ref 70–99)
GLUCOSE SERPL-MCNC: 157 MG/DL — HIGH (ref 70–99)
GLUCOSE SERPL-MCNC: 88 MG/DL — SIGNIFICANT CHANGE UP (ref 70–99)
GLUCOSE SERPL-MCNC: 88 MG/DL — SIGNIFICANT CHANGE UP (ref 70–99)
HAV IGM SER-ACNC: SIGNIFICANT CHANGE UP
HBV CORE IGM SER-ACNC: SIGNIFICANT CHANGE UP
HBV SURFACE AG SER-ACNC: SIGNIFICANT CHANGE UP
HCT VFR BLD CALC: 19.7 % — CRITICAL LOW (ref 34.5–45)
HCT VFR BLD CALC: 23.3 % — LOW (ref 34.5–45)
HCV AB S/CO SERPL IA: 0.09 S/CO — SIGNIFICANT CHANGE UP (ref 0–0.99)
HCV AB SERPL-IMP: SIGNIFICANT CHANGE UP
HGB BLD-MCNC: 6.8 G/DL — CRITICAL LOW (ref 11.5–15.5)
HGB BLD-MCNC: 7.8 G/DL — LOW (ref 11.5–15.5)
MAGNESIUM SERPL-MCNC: 2 MG/DL — SIGNIFICANT CHANGE UP (ref 1.6–2.6)
MAGNESIUM SERPL-MCNC: 2 MG/DL — SIGNIFICANT CHANGE UP (ref 1.6–2.6)
MCHC RBC-ENTMCNC: 26.4 PG — LOW (ref 27–34)
MCHC RBC-ENTMCNC: 26.7 PG — LOW (ref 27–34)
MCHC RBC-ENTMCNC: 33.5 GM/DL — SIGNIFICANT CHANGE UP (ref 32–36)
MCHC RBC-ENTMCNC: 34.5 GM/DL — SIGNIFICANT CHANGE UP (ref 32–36)
MCV RBC AUTO: 77.3 FL — LOW (ref 80–100)
MCV RBC AUTO: 78.7 FL — LOW (ref 80–100)
NRBC # BLD: 0 /100 WBCS — SIGNIFICANT CHANGE UP (ref 0–0)
NRBC # BLD: 0 /100 WBCS — SIGNIFICANT CHANGE UP (ref 0–0)
NRBC # FLD: 0.05 K/UL — HIGH (ref 0–0)
NRBC # FLD: 0.06 K/UL — HIGH (ref 0–0)
PHOSPHATE SERPL-MCNC: 4.6 MG/DL — HIGH (ref 2.5–4.5)
PHOSPHATE SERPL-MCNC: 5 MG/DL — HIGH (ref 2.5–4.5)
PLATELET # BLD AUTO: 60 K/UL — LOW (ref 150–400)
PLATELET # BLD AUTO: 81 K/UL — LOW (ref 150–400)
POTASSIUM SERPL-MCNC: 5.4 MMOL/L — HIGH (ref 3.5–5.3)
POTASSIUM SERPL-MCNC: 5.4 MMOL/L — HIGH (ref 3.5–5.3)
POTASSIUM SERPL-MCNC: 5.5 MMOL/L — HIGH (ref 3.5–5.3)
POTASSIUM SERPL-SCNC: 5.4 MMOL/L — HIGH (ref 3.5–5.3)
POTASSIUM SERPL-SCNC: 5.4 MMOL/L — HIGH (ref 3.5–5.3)
POTASSIUM SERPL-SCNC: 5.5 MMOL/L — HIGH (ref 3.5–5.3)
PROT SERPL-MCNC: 6.2 G/DL — SIGNIFICANT CHANGE UP (ref 6–8.3)
RBC # BLD: 2.55 M/UL — LOW (ref 3.8–5.2)
RBC # BLD: 2.96 M/UL — LOW (ref 3.8–5.2)
RBC # FLD: 15 % — HIGH (ref 10.3–14.5)
RBC # FLD: 15.4 % — HIGH (ref 10.3–14.5)
SODIUM SERPL-SCNC: 121 MMOL/L — LOW (ref 135–145)
SODIUM SERPL-SCNC: 122 MMOL/L — LOW (ref 135–145)
SODIUM SERPL-SCNC: 122 MMOL/L — LOW (ref 135–145)
T4 AB SER-ACNC: 6.59 UG/DL — SIGNIFICANT CHANGE UP (ref 5.1–13)
TSH SERPL-MCNC: 1.35 UIU/ML — SIGNIFICANT CHANGE UP (ref 0.27–4.2)
WBC # BLD: 12.02 K/UL — HIGH (ref 3.8–10.5)
WBC # BLD: 8.61 K/UL — SIGNIFICANT CHANGE UP (ref 3.8–10.5)
WBC # FLD AUTO: 12.02 K/UL — HIGH (ref 3.8–10.5)
WBC # FLD AUTO: 8.61 K/UL — SIGNIFICANT CHANGE UP (ref 3.8–10.5)

## 2023-01-21 PROCEDURE — 99233 SBSQ HOSP IP/OBS HIGH 50: CPT | Mod: GC

## 2023-01-21 PROCEDURE — 99233 SBSQ HOSP IP/OBS HIGH 50: CPT

## 2023-01-21 RX ORDER — CALCIUM GLUCONATE 100 MG/ML
2 VIAL (ML) INTRAVENOUS ONCE
Refills: 0 | Status: COMPLETED | OUTPATIENT
Start: 2023-01-21 | End: 2023-01-21

## 2023-01-21 RX ORDER — DEXTROSE 50 % IN WATER 50 %
50 SYRINGE (ML) INTRAVENOUS ONCE
Refills: 0 | Status: COMPLETED | OUTPATIENT
Start: 2023-01-21 | End: 2023-01-21

## 2023-01-21 RX ORDER — INSULIN HUMAN 100 [IU]/ML
5 INJECTION, SOLUTION SUBCUTANEOUS ONCE
Refills: 0 | Status: COMPLETED | OUTPATIENT
Start: 2023-01-21 | End: 2023-01-21

## 2023-01-21 RX ORDER — SODIUM ZIRCONIUM CYCLOSILICATE 10 G/10G
10 POWDER, FOR SUSPENSION ORAL ONCE
Refills: 0 | Status: COMPLETED | OUTPATIENT
Start: 2023-01-21 | End: 2023-01-21

## 2023-01-21 RX ORDER — BUMETANIDE 0.25 MG/ML
2 INJECTION INTRAMUSCULAR; INTRAVENOUS EVERY 8 HOURS
Refills: 0 | Status: DISCONTINUED | OUTPATIENT
Start: 2023-01-21 | End: 2023-01-25

## 2023-01-21 RX ORDER — SODIUM ZIRCONIUM CYCLOSILICATE 10 G/10G
10 POWDER, FOR SUSPENSION ORAL DAILY
Refills: 0 | Status: DISCONTINUED | OUTPATIENT
Start: 2023-01-21 | End: 2023-01-24

## 2023-01-21 RX ADMIN — Medication 94 MICROGRAM(S): at 22:55

## 2023-01-21 RX ADMIN — BUMETANIDE 2 MILLIGRAM(S): 0.25 INJECTION INTRAMUSCULAR; INTRAVENOUS at 22:56

## 2023-01-21 RX ADMIN — PIPERACILLIN AND TAZOBACTAM 25 GRAM(S): 4; .5 INJECTION, POWDER, LYOPHILIZED, FOR SOLUTION INTRAVENOUS at 06:44

## 2023-01-21 RX ADMIN — MUPIROCIN 1 APPLICATION(S): 20 OINTMENT TOPICAL at 15:38

## 2023-01-21 RX ADMIN — Medication 3 MILLILITER(S): at 04:24

## 2023-01-21 RX ADMIN — Medication 1: at 11:56

## 2023-01-21 RX ADMIN — Medication 3 MILLILITER(S): at 23:06

## 2023-01-21 RX ADMIN — HEPARIN SODIUM 5000 UNIT(S): 5000 INJECTION INTRAVENOUS; SUBCUTANEOUS at 13:10

## 2023-01-21 RX ADMIN — MUPIROCIN 1 APPLICATION(S): 20 OINTMENT TOPICAL at 06:50

## 2023-01-21 RX ADMIN — Medication 1: at 17:29

## 2023-01-21 RX ADMIN — INSULIN HUMAN 5 UNIT(S): 100 INJECTION, SOLUTION SUBCUTANEOUS at 06:53

## 2023-01-21 RX ADMIN — Medication 2 UNIT(S): at 08:01

## 2023-01-21 RX ADMIN — Medication 200 GRAM(S): at 07:36

## 2023-01-21 RX ADMIN — Medication 2 UNIT(S): at 11:56

## 2023-01-21 RX ADMIN — BUMETANIDE 2 MILLIGRAM(S): 0.25 INJECTION INTRAMUSCULAR; INTRAVENOUS at 06:44

## 2023-01-21 RX ADMIN — SODIUM ZIRCONIUM CYCLOSILICATE 10 GRAM(S): 10 POWDER, FOR SUSPENSION ORAL at 07:36

## 2023-01-21 RX ADMIN — MUPIROCIN 1 APPLICATION(S): 20 OINTMENT TOPICAL at 21:39

## 2023-01-21 RX ADMIN — Medication 3 MILLILITER(S): at 07:41

## 2023-01-21 RX ADMIN — PIPERACILLIN AND TAZOBACTAM 25 GRAM(S): 4; .5 INJECTION, POWDER, LYOPHILIZED, FOR SOLUTION INTRAVENOUS at 17:29

## 2023-01-21 RX ADMIN — Medication 50 MILLILITER(S): at 06:53

## 2023-01-21 RX ADMIN — HEPARIN SODIUM 5000 UNIT(S): 5000 INJECTION INTRAVENOUS; SUBCUTANEOUS at 06:43

## 2023-01-21 RX ADMIN — PANTOPRAZOLE SODIUM 40 MILLIGRAM(S): 20 TABLET, DELAYED RELEASE ORAL at 11:32

## 2023-01-21 RX ADMIN — Medication 3 MILLILITER(S): at 15:23

## 2023-01-21 RX ADMIN — HEPARIN SODIUM 5000 UNIT(S): 5000 INJECTION INTRAVENOUS; SUBCUTANEOUS at 21:39

## 2023-01-21 NOTE — PROGRESS NOTE ADULT - PROBLEM SELECTOR PLAN 3
In the setting of ISAMAR. Serum potassium is elevated at 5.5 (non hemolyzed) today. Received medical management- insulin with D50 and lokelma. Recommend to start Lokelma 10 gm qd standing. Monitor serum potassium.    If you have any questions, please feel free to contact me  Cody Mcguire  Nephrology Fellow  [phone] / [employer] Teams(Preferred)  (After 5pm or on weekends please page the on-call fellow) Pt. with hyperkalemia in the setting of ISAMAR. Serum potassium elevated at 5.5 today. Received medical management as per primary team. Recommend Lokelma 10 gm qd. Low potassium diet. Monitor serum potassium.    If you have any questions, please feel free to contact me  Cody Mcguire  Nephrology Fellow  [phone] / [employer] Teams(Preferred)  (After 5pm or on weekends please page the on-call fellow)

## 2023-01-21 NOTE — PROGRESS NOTE ADULT - PROBLEM SELECTOR PLAN 1
Patient w/ hypothermia POA and tachypnea POA meeting sepsis criteria   - Worsening respiratory distress, placed on BIPAP from nasal cannula. Patient recently required intubation at OSH. Low threshold for MICU re-eval   - Pulm consulted and following - per pulm no role for thoracentesis    - CTA chest with bilateral pleural effusions and GGO, no PE. CT A/P commenting on increasing basilar airspace opacity compared to prior examination with dense consolidation in both lower lobes and right greater than left pleural effusions, not significantly changed.  - Given consolidations on CT, will treat empirically with Zosyn; MRSA negative, can stop vancomycin   - TTE showed grossly normal left ventricular systolic function. Mild diastolic dysfunction (Stage I). Right ventricular enlargement with decreased right ventricular systolic function. Estimated right ventricular systolic pressure equals 55  mm Hg, assuming right atrial pressure equals 10 mm Hg, consistent with moderate pulmonary hypertension.  - Diuresis w/ bumex per renal recs - titrating upward  - Unclear the cause of her persistent hospitalizations for multiorgan failure, she does not appear to be in myxedema coma - is this rheumatologic vs. hematologic? f/u ANCAs, complement, STEVO, dsDNA + SPEP/UPEP, immunofixation   - Cards consulted and following, may need RHC, but will need platelet and electrolyte optimization  - hiv neg, hep panel neg

## 2023-01-21 NOTE — PROGRESS NOTE ADULT - PROBLEM SELECTOR PLAN 1
Pt with ISAMAR on CKD likely in the setting of fluid overload and IV contrast use. Scr was 1.17 on 1/7/23. CT scan with IV contrast was done on 1/17. Scr was elevated at 1.52 on 1/18. Scr subsequently is elevated at 2.90 today (1/21/23). UOP is not documented. UA showed trace proteins with 4 RBCs. Spot urine TP/CR is elevated at 5.9 (repeat at 5.1). Serological work was done in view of nephrotic range proteinuria. Serum C3 is not low (128), C4 is not low (46), HIV (NR), Hep Bs Ag (negative), Hep C Ab (negative), Kappa / lambda ratio within range for CKD. STEVO is negative. Awaiting serum VEE and ANCA levels. Labs reviewed. Pt clinically in fluid overload. Recommend to increase Bumex dose to 3 mg IV BID. Cardiology note from 1/19/22 reviewed. Monitor labs and urine output. Avoid any potential nephrotoxins. Dose medications as per eGFR. Pt. with ISAMAR on CKD in the setting of infection and recent IV contrast use. CT scan with IV contrast was done on 1/17. Scr was elevated at 1.52 on 1/18. Scr has increased to 2.90 today (1/21/23). UOP is not documented. Spot urine TP/CR ratio was significantly elevated at 5.9. C3 and C4 were not low. Testing for HIV was negative. STEVO, HBsAg and Hep C Ab were nonreactive/negative. Awaiting serum VEE and ANCA levels. Pt. with LE edema/fluid overload, on IV Bumex therapy. Increase IV Bumex 2 mg IV TID. Cardiology note from 1/20/23 reviewed. Monitor labs and urine output. Avoid any potential nephrotoxins. Dose medications as per eGFR.

## 2023-01-21 NOTE — PROGRESS NOTE ADULT - PROBLEM SELECTOR PLAN 4
Na remains around 120  - Suspect multifactorial in setting of volume overload, SIADH  - receiving IV bumex, escalated per nephrology recommendations  - received an infusion of 3% saline 1/20  - Is/Os  - trend BMP regularly; goal correction of Na 4-6 mEq/24h, do not exceed >8 mEq correction in 24h

## 2023-01-21 NOTE — PROGRESS NOTE ADULT - SUBJECTIVE AND OBJECTIVE BOX
[facility] Division of Kidney Diseases & Hypertension  FOLLOW UP NOTE  [phone]--------------------------------------------------------------------------------    HPI: 56 y/o female with Hx of CKD, Hypothyroidism, HTN, DM, PAH, asthma, and HLD who is admitted at [facility] on 1/18/23 for SOB due to pneumonia. Pt being seen for ISAMAR on CKD, and hyponatremia.      24 hour events/subjective: This morning, pt was noted to be hypothermic to 90.4 F. Pt received a heating blanket with improvement in temperature. Pt was seen and evaluated bedside this morning. Pt reports feeling hot. Pt also endorses BL LE edema. Denies any headaches, nausea, vomiting, fevers/chills, chest pain, palpitations, SOB, and abdominal pain.        PAST HISTORY  --------------------------------------------------------------------------------  No significant changes to PMH, PSH, FHx, SHx, unless otherwise noted    ALLERGIES & MEDICATIONS  --------------------------------------------------------------------------------  Allergies    No Known Allergies    Intolerances      Standing Inpatient Medications  albuterol/ipratropium for Nebulization 3 milliLiter(s) Nebulizer every 6 hours  buMETAnide Injectable 2 milliGRAM(s) IV Push every 12 hours  dextrose 5%. 1000 milliLiter(s) IV Continuous <Continuous>  dextrose 5%. 1000 milliLiter(s) IV Continuous <Continuous>  dextrose 50% Injectable 25 Gram(s) IV Push once  dextrose 50% Injectable 12.5 Gram(s) IV Push once  dextrose 50% Injectable 25 Gram(s) IV Push once  glucagon  Injectable 1 milliGRAM(s) IntraMuscular once  heparin   Injectable 5000 Unit(s) SubCutaneous every 8 hours  influenza   Vaccine 0.5 milliLiter(s) IntraMuscular once  insulin glargine Injectable (LANTUS) 2 Unit(s) SubCutaneous at bedtime  insulin lispro (ADMELOG) corrective regimen sliding scale   SubCutaneous three times a day before meals  insulin lispro (ADMELOG) corrective regimen sliding scale   SubCutaneous at bedtime  insulin lispro Injectable (ADMELOG) 2 Unit(s) SubCutaneous three times a day before meals  levothyroxine Injectable 94 MICROGram(s) IV Push at bedtime  mupirocin 2% Ointment 1 Application(s) Topical three times a day  pantoprazole  Injectable 40 milliGRAM(s) IV Push daily  piperacillin/tazobactam IVPB.. 3.375 Gram(s) IV Intermittent every 12 hours  sodium chloride 3%. 500 milliLiter(s) IV Continuous <Continuous>    PRN Inpatient Medications  acetaminophen     Tablet .. 650 milliGRAM(s) Oral every 6 hours PRN  aluminum hydroxide/magnesium hydroxide/simethicone Suspension 30 milliLiter(s) Oral every 4 hours PRN  dextrose Oral Gel 15 Gram(s) Oral once PRN  guaiFENesin Oral Liquid (Sugar-Free) 200 milliGRAM(s) Oral every 6 hours PRN  melatonin 3 milliGRAM(s) Oral at bedtime PRN  ondansetron Injectable 4 milliGRAM(s) IV Push every 8 hours PRN      REVIEW OF SYSTEMS  --------------------------------------------------------------------------------  Gen: +subjective fever  Skin: No rashes  Head/Eyes/Ears/Mouth: No headache  Respiratory: No dyspnea  CV: No chest pain  GI: No abdominal pain  : No dysuria  MSK: +BL LE edema  Neuro: No dizziness/lightheadedness    All other systems were reviewed and are negative, except as noted.    VITALS/PHYSICAL EXAM  --------------------------------------------------------------------------------  T(C): 36.9 (01-21-23 @ 07:10), Max: 37 (01-21-23 @ 06:44)  HR: 91 (01-21-23 @ 07:41) (52 - 92)  BP: 145/65 (01-21-23 @ 06:55) (126/60 - 154/71)  RR: 18 (01-21-23 @ 06:55) (18 - 20)  SpO2: 100% (01-21-23 @ 07:41) (85% - 100%)  Wt(kg): --    Weight (kg): 68.209 (01-20-23 @ 16:32)      Physical Exam:  Gen: Resting  HEENT: MMM, on NC  Pulm: CTABL  CV: S1S2  Abd: Soft, +BS   Ext: B/L LE edema +  Neuro: Awake  Skin: Warm and dry  Vascular access: IV peripheral canula    LABS/STUDIES  --------------------------------------------------------------------------------              6.8    8.61  >-----------<  60       [01-21-23 @ 04:50]              19.7     121  |  89  |  39  ----------------------------<  88      [01-21-23 @ 04:50]  5.5   |  16  |  2.90        Ca     8.7     [01-21-23 @ 04:50]      Mg     2.00     [01-21-23 @ 04:50]      Phos  5.0     [01-21-23 @ 04:50]    TPro  6.2  /  Alb  3.2  /  TBili  1.1  /  DBili  x   /  AST  85  /  ALT  84  /  AlkPhos  432  [01-21-23 @ 04:50]    PT/INR: PT 14.7 , INR 1.26       [01-20-23 @ 19:51]  PTT: 51.9       [01-20-23 @ 19:51]      Creatinine Trend:  SCr 2.90 [01-21 @ 04:50]  SCr 2.77 [01-20 @ 19:51]  SCr 2.67 [01-20 @ 12:10]  SCr 2.53 [01-20 @ 06:04]  SCr 2.24 [01-19 @ 16:01]    Urinalysis - [01-18-23 @ 05:58]      Color Colorless / Appearance Clear / SG 1.011 / pH 6.0      Gluc Trace / Ketone Negative  / Bili Negative / Urobili <2 mg/dL       Blood Trace / Protein 30 mg/dL / Leuk Est Negative / Nitrite Negative      RBC 4 / WBC 3 / Hyaline 1 / Gran  / Sq Epi  / Non Sq Epi 3 / Bacteria Negative    Urine Creatinine 23      [01-19-23 @ 10:28]  Urine Protein 116      [01-19-23 @ 10:28]  Urine Sodium 47      [01-18-23 @ 05:58]  Urine Urea Nitrogen 114.0      [01-18-23 @ 05:58]  Urine Potassium 14.2      [01-18-23 @ 05:58]  Urine Chloride 45      [01-17-23 @ 09:00]  Urine Phosphorus 33.9      [01-17-23 @ 09:00]  Urine Osmolality 194      [01-18-23 @ 05:58]    Iron 42, TIBC 306, %sat 14      [01-17-23 @ 07:10]  Ferritin 729      [01-17-23 @ 07:10]  TSH 1.35      [01-21-23 @ 04:50]    HBsAg Nonreact      [01-19-23 @ 05:02]  HCV 0.13, Nonreact      [01-19-23 @ 05:02]  HIV Nonreact      [01-20-23 @ 19:51]    STEVO: titer Negative, pattern --      [01-19-23 @ 05:02]  dsDNA <12      [01-19-23 @ 05:02]  C3 Complement 128      [01-19-23 @ 05:02]  C4 Complement 46      [01-19-23 @ 05:02]  Rheumatoid Factor 10      [01-19-23 @ 05:02]  anti-GBM <0.2      [01-19-23 @ 05:02]  Free Light Chains: kappa 8.92, lambda 3.79, ratio = 2.35      [01-19 @ 05:02]  SPEP Interpretation: Increased Beta fraction, possibly transferrin increase. HUMPHREY Mccrary MD      [01-19-23 @ 05:02]   [facility] Division of Kidney Diseases & Hypertension  FOLLOW UP NOTE  [phone]--------------------------------------------------------------------------------    HPI: 57-year-old female with history of DM, HTN, CHF, CKD, hypothyroidism, PAH, asthma, and HLD admitted at [facility] on 1/18/23 for SOB/pneumonia. Pt. being seen for ISAMAR on CKD and hyponatremia.    24 hour events/subjective: This morning, pt was noted to be hypothermic to 90.4 F. Pt received a heating blanket with improvement in temperature. Pt. seen and examined this morning. Pt. says she feels hot. Pt. also gives history of B/L LE edema. No CP, SOB, abdominal pain, HA or dizziness.    PAST HISTORY  --------------------------------------------------------------------------------  No significant changes to PMH, PSH, FHx, SHx, unless otherwise noted    ALLERGIES & MEDICATIONS  --------------------------------------------------------------------------------  Allergies    No Known Allergies    Intolerances    Standing Inpatient Medications  albuterol/ipratropium for Nebulization 3 milliLiter(s) Nebulizer every 6 hours  buMETAnide Injectable 2 milliGRAM(s) IV Push every 12 hours  dextrose 5%. 1000 milliLiter(s) IV Continuous <Continuous>  dextrose 5%. 1000 milliLiter(s) IV Continuous <Continuous>  dextrose 50% Injectable 25 Gram(s) IV Push once  dextrose 50% Injectable 12.5 Gram(s) IV Push once  dextrose 50% Injectable 25 Gram(s) IV Push once  glucagon  Injectable 1 milliGRAM(s) IntraMuscular once  heparin   Injectable 5000 Unit(s) SubCutaneous every 8 hours  influenza   Vaccine 0.5 milliLiter(s) IntraMuscular once  insulin glargine Injectable (LANTUS) 2 Unit(s) SubCutaneous at bedtime  insulin lispro (ADMELOG) corrective regimen sliding scale   SubCutaneous three times a day before meals  insulin lispro (ADMELOG) corrective regimen sliding scale   SubCutaneous at bedtime  insulin lispro Injectable (ADMELOG) 2 Unit(s) SubCutaneous three times a day before meals  levothyroxine Injectable 94 MICROGram(s) IV Push at bedtime  mupirocin 2% Ointment 1 Application(s) Topical three times a day  pantoprazole  Injectable 40 milliGRAM(s) IV Push daily  piperacillin/tazobactam IVPB.. 3.375 Gram(s) IV Intermittent every 12 hours  sodium chloride 3%. 500 milliLiter(s) IV Continuous <Continuous>    PRN Inpatient Medications  acetaminophen     Tablet .. 650 milliGRAM(s) Oral every 6 hours PRN  aluminum hydroxide/magnesium hydroxide/simethicone Suspension 30 milliLiter(s) Oral every 4 hours PRN  dextrose Oral Gel 15 Gram(s) Oral once PRN  guaiFENesin Oral Liquid (Sugar-Free) 200 milliGRAM(s) Oral every 6 hours PRN  melatonin 3 milliGRAM(s) Oral at bedtime PRN  ondansetron Injectable 4 milliGRAM(s) IV Push every 8 hours PRN    REVIEW OF SYSTEMS  --------------------------------------------------------------------------------  Gen: See HPI   Respiratory: No dyspnea   CV: No chest pain   GI: No abdominal pain   MSK: LE edema   Neuro: No dizziness    All other systems were reviewed and are negative, except as noted.    VITALS/PHYSICAL EXAM  --------------------------------------------------------------------------------  T(C): 36.9 (01-21-23 @ 07:10), Max: 37 (01-21-23 @ 06:44)  HR: 91 (01-21-23 @ 07:41) (52 - 92)  BP: 145/65 (01-21-23 @ 06:55) (126/60 - 154/71)  RR: 18 (01-21-23 @ 06:55) (18 - 20)  SpO2: 100% (01-21-23 @ 07:41) (85% - 100%)  Wt(kg): --    Weight (kg): 68.209 (01-20-23 @ 16:32)    Physical Exam:    Gen: resting  HEENT: On NC  Pulm: CTA B/L  CV: S1S2+  Abd: Soft, +BS   Ext: B/L LE pitting edema+  Neuro: Responds to verbal questions+  Skin: Dry  Vascular access: IV peripheral canula    LABS/STUDIES  --------------------------------------------------------------------------------              6.8    8.61  >-----------<  60       [01-21-23 @ 04:50]              19.7     121  |  89  |  39  ----------------------------<  88      [01-21-23 @ 04:50]  5.5   |  16  |  2.90        Ca     8.7     [01-21-23 @ 04:50]      Mg     2.00     [01-21-23 @ 04:50]      Phos  5.0     [01-21-23 @ 04:50]    TPro  6.2  /  Alb  3.2  /  TBili  1.1  /  DBili  x   /  AST  85  /  ALT  84  /  AlkPhos  432  [01-21-23 @ 04:50]    Creatinine Trend:  SCr 2.90 [01-21 @ 04:50]  SCr 2.77 [01-20 @ 19:51]  SCr 2.67 [01-20 @ 12:10]  SCr 2.53 [01-20 @ 06:04]  SCr 2.24 [01-19 @ 16:01]    Urinalysis - [01-18-23 @ 05:58]      Color Colorless / Appearance Clear / SG 1.011 / pH 6.0      Gluc Trace / Ketone Negative  / Bili Negative / Urobili <2 mg/dL       Blood Trace / Protein 30 mg/dL / Leuk Est Negative / Nitrite Negative      RBC 4 / WBC 3 / Hyaline 1 / Gran  / Sq Epi  / Non Sq Epi 3 / Bacteria Negative    Urine Creatinine 23      [01-19-23 @ 10:28]  Urine Protein 116      [01-19-23 @ 10:28]  Urine Sodium 47      [01-18-23 @ 05:58]  Urine Urea Nitrogen 114.0      [01-18-23 @ 05:58]  Urine Potassium 14.2      [01-18-23 @ 05:58]  Urine Chloride 45      [01-17-23 @ 09:00]  Urine Phosphorus 33.9      [01-17-23 @ 09:00]  Urine Osmolality 194      [01-18-23 @ 05:58]    HBsAg Nonreact      [01-19-23 @ 05:02]  HCV 0.13, Nonreact      [01-19-23 @ 05:02]  HIV Nonreact      [01-20-23 @ 19:51]    STEVO: titer Negative, pattern --      [01-19-23 @ 05:02]  dsDNA <12      [01-19-23 @ 05:02]  C3 Complement 128      [01-19-23 @ 05:02]  C4 Complement 46      [01-19-23 @ 05:02]  Rheumatoid Factor 10      [01-19-23 @ 05:02]  anti-GBM <0.2      [01-19-23 @ 05:02]

## 2023-01-21 NOTE — CHART NOTE - NSCHARTNOTEFT_GEN_A_CORE
Brief endo note  57F with hypothyroidism, HTN, T2DM, HLD, CKD, PAH, asthma on albuterol presented to the ED w/respiratory distress. Endocrine consulted for hypothyroidism and DM2.    Interval hx  chart reviewed  glucose 212-->82 this morning with lantus 2 units  ordered for diet    PLAN:  1)Hypothyroidism  Taking Levothyroxine 125mcg PO as prescribed. TSH 2.56 on 1/17/23. FT4 1.6 normal.  -Patient had not received 125mcg PO levothyroxine in 2 days for unknown reason. Switched to IV formulation which she received last night.   Proceed with levothyroxine 94mcg daily IV for now. Plan to resume home oral dose at or closer to discharge.  -Low sodium and hypothermia unlikely to be related to hypothyroidism (or adrenal insufficiency). Recommend pursuing non-endocrine workup. Nephrology consulted.  Discharge plan: levothyroxine 125 mcg po daily, take in AM on empty stomach    2)Controlled T2DM  Patient with a hx of DM2. HbA1c 6.9%. Has had DM for 23 years. Takes repaglinide 2mg TIDac and Tresiba 1-12 units depending on BG. Checks BG at bedtime. Sometimes BG in the 200s. No lows. Has a good appetite.  -Hold non-insulin DM agents while inpatient  -BG target 100-180 - low today  Continue Admelog 2/2/2 units premeal TID  HOLD Lantus 2 units qhs for now large drop overnight and glucose in 80s (order discontinued)  -continue low dose Admelog correction scale pre-meal  -continue low dose Admelog correction scale at bedtime  -Fingerstick BG before meals and bedtime  -Goal -180  -Carbohydrate consistent diet  Discharge plan:  -Likely to discharge patient home on home regimen (see above). Final regimen pending clinical course.    HLD  -On atorvastatin 40mg daily. Continue this if no contraindications.    Ibis Vasquez MD  Attending Physician   Department of Endocrinology, Diabetes and Metabolism   [employer] Teams on 01-21-23 @ 10:37    If before 9AM or after 5PM, or on weekends/holidays, please call the Endocrine answering service for assistance ([phone]).  For nonurgent matters, please email [email or web address] for assistance.

## 2023-01-21 NOTE — PROGRESS NOTE ADULT - PROBLEM SELECTOR PLAN 5
Cr continues to trend upward  - Renal following, ?cardiorenal syndrome   - C/w bumex as above  - Monitor BMP  - Avoid nephrotoxins   - Is/Os  - Discussed with Nephrology attending rec emeka however per pulm no role for thorsaul cont diuresis

## 2023-01-21 NOTE — PROGRESS NOTE ADULT - PROBLEM SELECTOR PLAN 2
In the setting of fluid overload. SNa was low at 121. Repeat SNa improved to 123. Last SNa is low at 121 today. Alfie is at 47, U Osm is low at 194, serum cortisol is elevated at 21. Continue with IV diuretics as outlined above. Restrict fluid intake <1L per day. Monitor SNa q8. Pt. with hyponatremia in the setting of fluid overload. SNa low at 121 today. Alfie is at 47, U Osm is low at 194, serum cortisol is elevated at 21. Increase IV Bumex 2 mg IV TID. Restrict fluid intake <1L per day. Monitor SNa q12.

## 2023-01-21 NOTE — PROGRESS NOTE ADULT - PROBLEM SELECTOR PLAN 2
Unclear etiology   - Normal rectal exam per H&P and no overt s/s of bleeding   - S/p 1U PRBCs with appropriate response, required PRBCs during previous admission as well   - No RP bleed on CT   - Monitor CBC  -Hapto wnl, hiv neg  -  LDH elevated  - Iron studies with AOCD   - ?? Autoimmune vs. heme etiology?? SPEP, immunofixation   - Heme consulted as plt is also downtrending - they suspect bicytopenia is likely multifactorial from bone marrow ischemia, hypothyroidism, suppression from chronic illness, and renal insufficiency  - Also w/ mild thrombocytopenia  -f/u fob

## 2023-01-21 NOTE — PROGRESS NOTE ADULT - SUBJECTIVE AND OBJECTIVE BOX
[facility] Division of Hospital Medicine  Richmond SaeedDO  Available via MS Teams  In house pager [phone]    SUBJECTIVE / OVERNIGHT EVENTS:  Overnight hypothermic requiring warming blanket. Receiving PRBC transfusion this AM.  Was given 3% hypertonic saline infusion overnight for hyponatremia.  Wearing BIPAP mask this AM.  Asked me to speak to someone, but not clear who. Gestured at someone but there was no one else in the room.  Asking how long until she can go home.    ADDITIONAL REVIEW OF SYSTEMS:    MEDICATIONS  (STANDING):  albuterol/ipratropium for Nebulization 3 milliLiter(s) Nebulizer every 6 hours  buMETAnide Injectable 2 milliGRAM(s) IV Push every 12 hours  dextrose 5%. 1000 milliLiter(s) (50 mL/Hr) IV Continuous <Continuous>  dextrose 5%. 1000 milliLiter(s) (100 mL/Hr) IV Continuous <Continuous>  dextrose 50% Injectable 25 Gram(s) IV Push once  dextrose 50% Injectable 12.5 Gram(s) IV Push once  dextrose 50% Injectable 25 Gram(s) IV Push once  glucagon  Injectable 1 milliGRAM(s) IntraMuscular once  heparin   Injectable 5000 Unit(s) SubCutaneous every 8 hours  influenza   Vaccine 0.5 milliLiter(s) IntraMuscular once  insulin lispro (ADMELOG) corrective regimen sliding scale   SubCutaneous three times a day before meals  insulin lispro (ADMELOG) corrective regimen sliding scale   SubCutaneous at bedtime  levothyroxine Injectable 94 MICROGram(s) IV Push at bedtime  mupirocin 2% Ointment 1 Application(s) Topical three times a day  pantoprazole  Injectable 40 milliGRAM(s) IV Push daily  piperacillin/tazobactam IVPB.. 3.375 Gram(s) IV Intermittent every 12 hours  sodium chloride 3%. 500 milliLiter(s) (30 mL/Hr) IV Continuous <Continuous>    MEDICATIONS  (PRN):  acetaminophen     Tablet .. 650 milliGRAM(s) Oral every 6 hours PRN Temp greater or equal to 38C (100.4F), Mild Pain (1 - 3)  aluminum hydroxide/magnesium hydroxide/simethicone Suspension 30 milliLiter(s) Oral every 4 hours PRN Dyspepsia  dextrose Oral Gel 15 Gram(s) Oral once PRN Blood Glucose LESS THAN 70 milliGRAM(s)/deciliter  guaiFENesin Oral Liquid (Sugar-Free) 200 milliGRAM(s) Oral every 6 hours PRN Cough  melatonin 3 milliGRAM(s) Oral at bedtime PRN Insomnia  ondansetron Injectable 4 milliGRAM(s) IV Push every 8 hours PRN Nausea and/or Vomiting      I&O's Summary      PHYSICAL EXAM:  Vital Signs Last 24 Hrs  T(C): 35.8 (21 Jan 2023 12:52), Max: 37 (21 Jan 2023 06:44)  T(F): 96.5 (21 Jan 2023 12:52), Max: 98.6 (21 Jan 2023 06:44)  HR: 82 (21 Jan 2023 10:46) (57 - 92)  BP: 144/64 (21 Jan 2023 10:46) (126/60 - 154/71)  BP(mean): --  RR: 18 (21 Jan 2023 10:46) (18 - 18)  SpO2: 98% (21 Jan 2023 10:46) (95% - 100%)    Parameters below as of 21 Jan 2023 10:46  Patient On (Oxygen Delivery Method): nasal cannula  O2 Flow (L/min): 4    CONSTITUTIONAL: NAD, well-developed, well-groomed; wearing BIPAP mask  EYES: PERRLA; conjunctiva and sclera clear  ENMT: Moist oral mucosa, no pharyngeal injection or exudates; normal dentition  NECK: Supple, no palpable masses; no thyromegaly  RESPIRATORY: Normal respiratory effort; lungs are clear to auscultation bilaterally  CARDIOVASCULAR: Regular rate and rhythm, normal S1 and S2, no murmur/rub/gallop; 1+ pitting edema b/l LEs; Peripheral pulses are 2+ bilaterally  ABDOMEN: Nontender to palpation, normoactive bowel sounds, no rebound/guarding; No hepatosplenomegaly  MUSCULOSKELETAL:  no clubbing or cyanosis of digits; no joint swelling or tenderness to palpation  PSYCH: A+O to person, place, but not to time; affect appropriate  NEUROLOGY: CN 2-12 are intact and symmetric; no gross sensory deficits   SKIN: No rashes; no palpable lesions; ashen and dry digits and peripheral extremities but more proximally extremities are swollen    LABS:                        6.8    8.61  )-----------( 60       ( 21 Jan 2023 04:50 )             19.7     01-21    121<L>  |  89<L>  |  39<H>  ----------------------------<  88  5.5<H>   |  16<L>  |  2.90<H>    Ca    8.7      21 Jan 2023 04:50  Phos  5.0     01-21  Mg     2.00     01-21    TPro  6.2  /  Alb  3.2<L>  /  TBili  1.1  /  DBili  x   /  AST  85<H>  /  ALT  84<H>  /  AlkPhos  432<H>  01-21    PT/INR - ( 20 Jan 2023 19:51 )   PT: 14.7 sec;   INR: 1.26 ratio         PTT - ( 20 Jan 2023 19:51 )  PTT:51.9 sec          COVID-19 PCR: NotDetec (04 Oct 2022 06:50)  SARS-CoV-2: NotDetec (28 Sep 2022 00:04)    COMMUNICATION:  Care Discussed with Consultants/Other Providers and Details of Discussion: discussed with PA

## 2023-01-21 NOTE — PROGRESS NOTE ADULT - PROBLEM SELECTOR PLAN 7
Patient with two recent hospitalizations, one at [facility] and one at [facility] for myxedema coma   - Endo following, currently no evidence of myxedema   - TSH wnl   - C/w iv synthroid

## 2023-01-22 LAB
ANION GAP SERPL CALC-SCNC: 15 MMOL/L — HIGH (ref 7–14)
AUTO DIFF PNL BLD: ABNORMAL
BUN SERPL-MCNC: 44 MG/DL — HIGH (ref 7–23)
C-ANCA SER-ACNC: NEGATIVE — SIGNIFICANT CHANGE UP
CALCIUM SERPL-MCNC: 8.9 MG/DL — SIGNIFICANT CHANGE UP (ref 8.4–10.5)
CHLORIDE SERPL-SCNC: 91 MMOL/L — LOW (ref 98–107)
CO2 SERPL-SCNC: 16 MMOL/L — LOW (ref 22–31)
CREAT SERPL-MCNC: 3.18 MG/DL — HIGH (ref 0.5–1.3)
CULTURE RESULTS: SIGNIFICANT CHANGE UP
EGFR: 16 ML/MIN/1.73M2 — LOW
GLUCOSE BLDC GLUCOMTR-MCNC: 151 MG/DL — HIGH (ref 70–99)
GLUCOSE BLDC GLUCOMTR-MCNC: 217 MG/DL — HIGH (ref 70–99)
GLUCOSE BLDC GLUCOMTR-MCNC: 259 MG/DL — HIGH (ref 70–99)
GLUCOSE BLDC GLUCOMTR-MCNC: 313 MG/DL — HIGH (ref 70–99)
GLUCOSE SERPL-MCNC: 139 MG/DL — HIGH (ref 70–99)
HCT VFR BLD CALC: 22.1 % — LOW (ref 34.5–45)
HGB BLD-MCNC: 7.8 G/DL — LOW (ref 11.5–15.5)
MAGNESIUM SERPL-MCNC: 1.9 MG/DL — SIGNIFICANT CHANGE UP (ref 1.6–2.6)
MCHC RBC-ENTMCNC: 27.6 PG — SIGNIFICANT CHANGE UP (ref 27–34)
MCHC RBC-ENTMCNC: 35.3 GM/DL — SIGNIFICANT CHANGE UP (ref 32–36)
MCV RBC AUTO: 78.1 FL — LOW (ref 80–100)
NRBC # BLD: 0 /100 WBCS — SIGNIFICANT CHANGE UP (ref 0–0)
NRBC # FLD: 0.07 K/UL — HIGH (ref 0–0)
P-ANCA SER-ACNC: NEGATIVE — SIGNIFICANT CHANGE UP
PHOSPHATE SERPL-MCNC: 5 MG/DL — HIGH (ref 2.5–4.5)
PLATELET # BLD AUTO: 79 K/UL — LOW (ref 150–400)
POTASSIUM SERPL-MCNC: 5.5 MMOL/L — HIGH (ref 3.5–5.3)
POTASSIUM SERPL-SCNC: 5.5 MMOL/L — HIGH (ref 3.5–5.3)
RBC # BLD: 2.83 M/UL — LOW (ref 3.8–5.2)
RBC # FLD: 15.6 % — HIGH (ref 10.3–14.5)
SARS-COV-2 RNA SPEC QL NAA+PROBE: SIGNIFICANT CHANGE UP
SODIUM SERPL-SCNC: 122 MMOL/L — LOW (ref 135–145)
SPECIMEN SOURCE: SIGNIFICANT CHANGE UP
WBC # BLD: 9.63 K/UL — SIGNIFICANT CHANGE UP (ref 3.8–10.5)
WBC # FLD AUTO: 9.63 K/UL — SIGNIFICANT CHANGE UP (ref 3.8–10.5)

## 2023-01-22 PROCEDURE — 99232 SBSQ HOSP IP/OBS MODERATE 35: CPT | Mod: GC

## 2023-01-22 PROCEDURE — 99233 SBSQ HOSP IP/OBS HIGH 50: CPT

## 2023-01-22 RX ADMIN — Medication 1: at 08:16

## 2023-01-22 RX ADMIN — HEPARIN SODIUM 5000 UNIT(S): 5000 INJECTION INTRAVENOUS; SUBCUTANEOUS at 05:57

## 2023-01-22 RX ADMIN — SODIUM ZIRCONIUM CYCLOSILICATE 10 GRAM(S): 10 POWDER, FOR SUSPENSION ORAL at 08:17

## 2023-01-22 RX ADMIN — MUPIROCIN 1 APPLICATION(S): 20 OINTMENT TOPICAL at 15:08

## 2023-01-22 RX ADMIN — BUMETANIDE 2 MILLIGRAM(S): 0.25 INJECTION INTRAMUSCULAR; INTRAVENOUS at 15:08

## 2023-01-22 RX ADMIN — HEPARIN SODIUM 5000 UNIT(S): 5000 INJECTION INTRAVENOUS; SUBCUTANEOUS at 15:07

## 2023-01-22 RX ADMIN — Medication 3: at 12:18

## 2023-01-22 RX ADMIN — PIPERACILLIN AND TAZOBACTAM 25 GRAM(S): 4; .5 INJECTION, POWDER, LYOPHILIZED, FOR SOLUTION INTRAVENOUS at 17:25

## 2023-01-22 RX ADMIN — Medication 3 MILLILITER(S): at 16:10

## 2023-01-22 RX ADMIN — MUPIROCIN 1 APPLICATION(S): 20 OINTMENT TOPICAL at 21:43

## 2023-01-22 RX ADMIN — Medication 2: at 17:25

## 2023-01-22 RX ADMIN — MUPIROCIN 1 APPLICATION(S): 20 OINTMENT TOPICAL at 06:00

## 2023-01-22 RX ADMIN — PANTOPRAZOLE SODIUM 40 MILLIGRAM(S): 20 TABLET, DELAYED RELEASE ORAL at 15:08

## 2023-01-22 RX ADMIN — Medication 3 MILLILITER(S): at 22:32

## 2023-01-22 RX ADMIN — Medication 94 MICROGRAM(S): at 21:44

## 2023-01-22 RX ADMIN — PIPERACILLIN AND TAZOBACTAM 25 GRAM(S): 4; .5 INJECTION, POWDER, LYOPHILIZED, FOR SOLUTION INTRAVENOUS at 05:56

## 2023-01-22 RX ADMIN — BUMETANIDE 2 MILLIGRAM(S): 0.25 INJECTION INTRAMUSCULAR; INTRAVENOUS at 21:43

## 2023-01-22 RX ADMIN — Medication 3 MILLILITER(S): at 04:25

## 2023-01-22 RX ADMIN — BUMETANIDE 2 MILLIGRAM(S): 0.25 INJECTION INTRAMUSCULAR; INTRAVENOUS at 05:59

## 2023-01-22 RX ADMIN — HEPARIN SODIUM 5000 UNIT(S): 5000 INJECTION INTRAVENOUS; SUBCUTANEOUS at 21:43

## 2023-01-22 RX ADMIN — Medication 2: at 22:14

## 2023-01-22 NOTE — PROGRESS NOTE ADULT - ASSESSMENT
58 yo lady PMHx of HFpEF and pulmonary HTN who was admitted for acute on chronic decompensated HF exacerbation and ISAMAR on CKD    Plan  - C/w IV bumex at current dose w/ strict I/O and daily standing weight  - Will obtain RHC tomorrow     Thank you    Karthikeyan Riley M.D.  PGY6 Cardiology

## 2023-01-22 NOTE — PROGRESS NOTE ADULT - SUBJECTIVE AND OBJECTIVE BOX
[facility] Division of Hospital Medicine  [place] SaeedDO  Available via MS Teams  In house pager [phone]    SUBJECTIVE / OVERNIGHT EVENTS:  Overnight patient with rising creatinine.  Remains on BIPAP this morning.  Says she feels okay but is concerned about her breathing.  I discussed her overall condition with her and particularly the kidney function and respiratory status.    ADDITIONAL REVIEW OF SYSTEMS:    MEDICATIONS  (STANDING):  albuterol/ipratropium for Nebulization 3 milliLiter(s) Nebulizer every 6 hours  buMETAnide Injectable 2 milliGRAM(s) IV Push every 8 hours  dextrose 5%. 1000 milliLiter(s) (50 mL/Hr) IV Continuous <Continuous>  dextrose 5%. 1000 milliLiter(s) (100 mL/Hr) IV Continuous <Continuous>  dextrose 50% Injectable 25 Gram(s) IV Push once  dextrose 50% Injectable 12.5 Gram(s) IV Push once  dextrose 50% Injectable 25 Gram(s) IV Push once  glucagon  Injectable 1 milliGRAM(s) IntraMuscular once  heparin   Injectable 5000 Unit(s) SubCutaneous every 8 hours  influenza   Vaccine 0.5 milliLiter(s) IntraMuscular once  insulin lispro (ADMELOG) corrective regimen sliding scale   SubCutaneous three times a day before meals  insulin lispro (ADMELOG) corrective regimen sliding scale   SubCutaneous at bedtime  levothyroxine Injectable 94 MICROGram(s) IV Push at bedtime  mupirocin 2% Ointment 1 Application(s) Topical three times a day  pantoprazole  Injectable 40 milliGRAM(s) IV Push daily  piperacillin/tazobactam IVPB.. 3.375 Gram(s) IV Intermittent every 12 hours  sodium chloride 3%. 500 milliLiter(s) (30 mL/Hr) IV Continuous <Continuous>  sodium zirconium cyclosilicate 10 Gram(s) Oral daily    MEDICATIONS  (PRN):  acetaminophen     Tablet .. 650 milliGRAM(s) Oral every 6 hours PRN Temp greater or equal to 38C (100.4F), Mild Pain (1 - 3)  aluminum hydroxide/magnesium hydroxide/simethicone Suspension 30 milliLiter(s) Oral every 4 hours PRN Dyspepsia  dextrose Oral Gel 15 Gram(s) Oral once PRN Blood Glucose LESS THAN 70 milliGRAM(s)/deciliter  guaiFENesin Oral Liquid (Sugar-Free) 200 milliGRAM(s) Oral every 6 hours PRN Cough  melatonin 3 milliGRAM(s) Oral at bedtime PRN Insomnia  ondansetron Injectable 4 milliGRAM(s) IV Push every 8 hours PRN Nausea and/or Vomiting      I&O's Summary    21 Jan 2023 07:01  -  22 Jan 2023 07:00  --------------------------------------------------------  IN: 600 mL / OUT: 850 mL / NET: -250 mL        PHYSICAL EXAM:  Vital Signs Last 24 Hrs  T(C): 36.5 (22 Jan 2023 12:50), Max: 36.8 (21 Jan 2023 18:00)  T(F): 97.7 (22 Jan 2023 12:50), Max: 98.2 (21 Jan 2023 18:00)  HR: 88 (22 Jan 2023 12:50) (77 - 105)  BP: 145/69 (22 Jan 2023 12:50) (140/74 - 153/69)  BP(mean): --  RR: 17 (22 Jan 2023 12:50) (17 - 20)  SpO2: 100% (22 Jan 2023 12:50) (96% - 100%)    Parameters below as of 22 Jan 2023 12:50  Patient On (Oxygen Delivery Method): BiPAP/CPAP        CONSTITUTIONAL: NAD, well-developed, well-groomed; wearing BIPAP mask  EYES: PERRLA; conjunctiva and sclera clear  ENMT: Moist oral mucosa, no pharyngeal injection or exudates; normal dentition  NECK: Supple, no palpable masses; no thyromegaly  RESPIRATORY: Normal respiratory effort; lungs are clear to auscultation bilaterally  CARDIOVASCULAR: Regular rate and rhythm, normal S1 and S2, no murmur/rub/gallop; 1+ pitting edema b/l LEs; Peripheral pulses are 2+ bilaterally  ABDOMEN: Nontender to palpation, normoactive bowel sounds, no rebound/guarding; No hepatosplenomegaly  MUSCULOSKELETAL:  no clubbing or cyanosis of digits; no joint swelling or tenderness to palpation  PSYCH: A+O to person, place, but not to time; affect appropriate  NEUROLOGY: CN 2-12 are intact and symmetric; no gross sensory deficits   SKIN: No rashes; no palpable lesions; ashen and dry digits and peripheral extremities but more proximally extremities are swollen    LABS:                        7.8    9.63  )-----------( 79       ( 22 Jan 2023 03:45 )             22.1     01-22    122<L>  |  91<L>  |  44<H>  ----------------------------<  139<H>  5.5<H>   |  16<L>  |  3.18<H>    Ca    8.9      22 Jan 2023 03:45  Phos  5.0     01-22  Mg     1.90     01-22    TPro  6.2  /  Alb  3.2<L>  /  TBili  1.1  /  DBili  x   /  AST  85<H>  /  ALT  84<H>  /  AlkPhos  432<H>  01-21    PT/INR - ( 20 Jan 2023 19:51 )   PT: 14.7 sec;   INR: 1.26 ratio         PTT - ( 20 Jan 2023 19:51 )  PTT:51.9 sec          Culture - Blood (collected 20 Jan 2023 21:04)  Source: .Blood Blood-Peripheral  Preliminary Report (22 Jan 2023 03:02):    No growth to date.    Culture - Blood (collected 20 Jan 2023 21:04)  Source: .Blood Blood-Peripheral  Preliminary Report (22 Jan 2023 03:02):    No growth to date.      COVID-19 PCR: [facility] (04 Oct 2022 06:50)  SARS-CoV-2: [facility] (28 Sep 2022 00:04)

## 2023-01-22 NOTE — PROGRESS NOTE ADULT - PROBLEM SELECTOR PLAN 3
Pt. with hyperkalemia in the setting of ISAMAR. Serum potassium elevated at 5.5 today, although moderately hemolyzed specimen. Received medical management as per primary team. Recommend to continue Lokelma 10 gm qd. Low potassium diet. Monitor serum potassium.    If you have any questions, please feel free to contact me  Cody Mcguire  Nephrology Fellow  [phone] / Microsoft Teams(Preferred)  (After 5pm or on weekends please page the on-call fellow)

## 2023-01-22 NOTE — PROGRESS NOTE ADULT - PROBLEM SELECTOR PLAN 1
Pt. with ISAMAR on CKD in the setting of infection and recent IV contrast use. CT scan with IV contrast was done on 1/17. Scr was elevated at 1.52 on 1/18. Scr is elevated/stable at 3.18 today (1/22/23). UOP is not documented. Spot urine TP/CR ratio was significantly elevated at 5.9. C3 and C4 were not low. Testing for HIV was negative. STEVO, HBsAg and Hep C Ab were nonreactive/negative. Awaiting serum VEE and ANCA levels. Pt. with LE edema/fluid overload, on IV Bumex therapy. Recommend to continue with IV Bumex 2 mg IV TID. Cardiology note from 1/20/23 reviewed. Monitor labs and urine output. Avoid any potential nephrotoxins. Dose medications as per eGFR.

## 2023-01-22 NOTE — PROGRESS NOTE ADULT - PROBLEM SELECTOR PLAN 2
Pt. with hyponatremia in the setting of fluid overload. SNa low/stable at 122 today. Alfie is at 47, U Osm is low at 194, serum cortisol is elevated at 21. Recommend to continue with diuretic therapy, as above. Restrict fluid intake <1L per day. Monitor SNa q12.

## 2023-01-22 NOTE — PROGRESS NOTE ADULT - SUBJECTIVE AND OBJECTIVE BOX
[facility] Division of Kidney Diseases & Hypertension  FOLLOW UP NOTE  [phone]--------------------------------------------------------------------------------  HPI: 57-year-old female with history of DM, HTN, CHF, CKD, hypothyroidism, PAH, asthma, and HLD admitted at [facility] on 1/18/23 for SOB/pneumonia. Pt. being seen for ISAMAR on CKD and hyponatremia.    24 hour events/subjective: Pt. seen and examined this morning. Reports feeling well, endorses no complaints. No CP, SOB, abdominal pain, HA or dizziness.      PAST HISTORY  --------------------------------------------------------------------------------  No significant changes to PMH, PSH, FHx, SHx, unless otherwise noted    ALLERGIES & MEDICATIONS  --------------------------------------------------------------------------------  Allergies    No Known Allergies    Intolerances      Standing Inpatient Medications  albuterol/ipratropium for Nebulization 3 milliLiter(s) Nebulizer every 6 hours  buMETAnide Injectable 2 milliGRAM(s) IV Push every 8 hours  dextrose 5%. 1000 milliLiter(s) IV Continuous <Continuous>  dextrose 5%. 1000 milliLiter(s) IV Continuous <Continuous>  dextrose 50% Injectable 25 Gram(s) IV Push once  dextrose 50% Injectable 12.5 Gram(s) IV Push once  dextrose 50% Injectable 25 Gram(s) IV Push once  glucagon  Injectable 1 milliGRAM(s) IntraMuscular once  heparin   Injectable 5000 Unit(s) SubCutaneous every 8 hours  influenza   Vaccine 0.5 milliLiter(s) IntraMuscular once  insulin lispro (ADMELOG) corrective regimen sliding scale   SubCutaneous three times a day before meals  insulin lispro (ADMELOG) corrective regimen sliding scale   SubCutaneous at bedtime  levothyroxine Injectable 94 MICROGram(s) IV Push at bedtime  mupirocin 2% Ointment 1 Application(s) Topical three times a day  pantoprazole  Injectable 40 milliGRAM(s) IV Push daily  piperacillin/tazobactam IVPB.. 3.375 Gram(s) IV Intermittent every 12 hours  sodium chloride 3%. 500 milliLiter(s) IV Continuous <Continuous>  sodium zirconium cyclosilicate 10 Gram(s) Oral daily    PRN Inpatient Medications  acetaminophen     Tablet .. 650 milliGRAM(s) Oral every 6 hours PRN  aluminum hydroxide/magnesium hydroxide/simethicone Suspension 30 milliLiter(s) Oral every 4 hours PRN  dextrose Oral Gel 15 Gram(s) Oral once PRN  guaiFENesin Oral Liquid (Sugar-Free) 200 milliGRAM(s) Oral every 6 hours PRN  melatonin 3 milliGRAM(s) Oral at bedtime PRN  ondansetron Injectable 4 milliGRAM(s) IV Push every 8 hours PRN      REVIEW OF SYSTEMS  --------------------------------------------------------------------------------  Gen: No fevers/chills   Respiratory: No dyspnea   CV: No chest pain   GI: No abdominal pain   MSK: LE edema   Neuro: No dizziness    All other systems were reviewed and are negative, except as noted.    VITALS/PHYSICAL EXAM  --------------------------------------------------------------------------------  T(C): 36.6 (01-22-23 @ 05:45), Max: 36.8 (01-21-23 @ 18:00)  HR: 84 (01-22-23 @ 08:20) (77 - 105)  BP: 145/73 (01-22-23 @ 06:00) (140/74 - 153/69)  RR: 18 (01-22-23 @ 06:00) (18 - 20)  SpO2: 96% (01-22-23 @ 08:20) (96% - 100%)  Wt(kg): --    Weight (kg): 68.209 (01-20-23 @ 16:32)      01-21-23 @ 07:01  -  01-22-23 @ 07:00  --------------------------------------------------------  IN: 600 mL / OUT: 850 mL / NET: -250 mL      Physical Exam:  Gen: resting  HEENT: On NC  Pulm: CTA B/L  CV: S1S2+  Abd: Soft, +BS   Ext: B/L LE pitting edema+  Neuro: Awake and alert  Skin: Dry  Vascular access: IV peripheral     LABS/STUDIES  --------------------------------------------------------------------------------              7.8    9.63  >-----------<  79       [01-22-23 @ 03:45]              22.1     122  |  91  |  44  ----------------------------<  139      [01-22-23 @ 03:45]  5.5   |  16  |  3.18        Ca     8.9     [01-22-23 @ 03:45]      Mg     1.90     [01-22-23 @ 03:45]      Phos  5.0     [01-22-23 @ 03:45]    TPro  6.2  /  Alb  3.2  /  TBili  1.1  /  DBili  x   /  AST  85  /  ALT  84  /  AlkPhos  432  [01-21-23 @ 04:50]    PT/INR: PT 14.7 , INR 1.26       [01-20-23 @ 19:51]  PTT: 51.9       [01-20-23 @ 19:51]    Creatinine Trend:  SCr 3.18 [01-22 @ 03:45]  SCr 3.19 [01-21 @ 16:52]  SCr 2.90 [01-21 @ 04:50]  SCr 2.77 [01-20 @ 19:51]  SCr 2.67 [01-20 @ 12:10]    Urinalysis - [01-18-23 @ 05:58]      Color Colorless / Appearance Clear / SG 1.011 / pH 6.0      Gluc Trace / Ketone Negative  / Bili Negative / Urobili <2 mg/dL       Blood Trace / Protein 30 mg/dL / Leuk Est Negative / Nitrite Negative      RBC 4 / WBC 3 / Hyaline 1 / Gran  / Sq Epi  / Non Sq Epi 3 / Bacteria Negative    Urine Creatinine 23      [01-19-23 @ 10:28]  Urine Protein 116      [01-19-23 @ 10:28]  Urine Sodium 47      [01-18-23 @ 05:58]  Urine Urea Nitrogen 114.0      [01-18-23 @ 05:58]  Urine Potassium 14.2      [01-18-23 @ 05:58]  Urine Chloride 45      [01-17-23 @ 09:00]  Urine Phosphorus 33.9      [01-17-23 @ 09:00]  Urine Osmolality 194      [01-18-23 @ 05:58]    Iron 42, TIBC 306, %sat 14      [01-17-23 @ 07:10]  Ferritin 729      [01-17-23 @ 07:10]  TSH 1.35      [01-21-23 @ 04:50]    HBsAg Nonreact      [01-20-23 @ 19:51]  HCV 0.09, Nonreact      [01-20-23 @ 19:51]  HIV Nonreact      [01-20-23 @ 19:51]    STEVO: titer Negative, pattern --      [01-19-23 @ 05:02]  dsDNA <12      [01-19-23 @ 05:02]  C3 Complement 128      [01-19-23 @ 05:02]  C4 Complement 46      [01-19-23 @ 05:02]  Rheumatoid Factor 10      [01-19-23 @ 05:02]  anti-GBM <0.2      [01-19-23 @ 05:02]  Free Light Chains: kappa 8.92, lambda 3.79, ratio = 2.35      [01-19 @ 05:02]  SPEP Interpretation: Increased Beta fraction, possibly transferrin increase. HUMPHREY Mccrary MD      [01-19-23 @ 05:02]

## 2023-01-22 NOTE — PROGRESS NOTE ADULT - PROBLEM SELECTOR PLAN 1
Patient w/ hypothermia POA and tachypnea POA meeting sepsis criteria   - Worsening respiratory distress, placed on BIPAP from nasal cannula. Patient recently required intubation at OSH. Low threshold for MICU re-eval   - Pulm consulted and following - per pulm no role for thoracentesis    - CTA chest with bilateral pleural effusions and GGO, no PE. CT A/P commenting on increasing basilar airspace opacity compared to prior examination with dense consolidation in both lower lobes and right greater than left pleural effusions, not significantly changed.  - Given consolidations on CT, will treat empirically with Zosyn; MRSA negative, can stop vancomycin   - TTE showed grossly normal left ventricular systolic function. Mild diastolic dysfunction (Stage I). Right ventricular enlargement with decreased right ventricular systolic function. Estimated right ventricular systolic pressure equals 55  mm Hg, assuming right atrial pressure equals 10 mm Hg, consistent with moderate pulmonary hypertension.  - Diuresis w/ bumex per renal recs - titrating upward  - Unclear the cause of her persistent hospitalizations for multiorgan failure, she does not appear to be in myxedema coma - is this rheumatologic vs. hematologic? f/u ANCAs, complement, STEVO, dsDNA + SPEP/UPEP, immunofixation   - Cards consulted and following, now planning for [facility] 1/23  - hiv neg, hep panel neg

## 2023-01-22 NOTE — PROGRESS NOTE ADULT - PROBLEM SELECTOR PLAN 2
Unclear etiology   - Normal rectal exam per H&P and no overt s/s of bleeding   - S/p 1U PRBCs with appropriate response, required PRBCs during previous admission as well   - No RP bleed on CT   - Monitor CBC  - Hapto wnl, hiv neg  -  LDH elevated  - Iron studies with AOCD   - considering Autoimmune vs. heme etiology - SPEP, immunofixation   - Heme consulted as plt is also downtrending - they suspect bicytopenia is likely multifactorial from bone marrow ischemia, hypothyroidism, suppression from chronic illness, and renal insufficiency  - Also w/ mild thrombocytopenia  - f/u fob

## 2023-01-22 NOTE — PROGRESS NOTE ADULT - SUBJECTIVE AND OBJECTIVE BOX
CARDIOLOGY FELLOW PROGRESS NOTE    Subjective:    No acute events overnight.   ROS negative.     Current Medications:   acetaminophen     Tablet .. 650 milliGRAM(s) Oral every 6 hours PRN  albuterol/ipratropium for Nebulization 3 milliLiter(s) Nebulizer every 6 hours  aluminum hydroxide/magnesium hydroxide/simethicone Suspension 30 milliLiter(s) Oral every 4 hours PRN  buMETAnide Injectable 2 milliGRAM(s) IV Push every 8 hours  dextrose 5%. 1000 milliLiter(s) IV Continuous <Continuous>  dextrose 5%. 1000 milliLiter(s) IV Continuous <Continuous>  dextrose 50% Injectable 25 Gram(s) IV Push once  dextrose 50% Injectable 12.5 Gram(s) IV Push once  dextrose 50% Injectable 25 Gram(s) IV Push once  dextrose Oral Gel 15 Gram(s) Oral once PRN  glucagon  Injectable 1 milliGRAM(s) IntraMuscular once  guaiFENesin Oral Liquid (Sugar-Free) 200 milliGRAM(s) Oral every 6 hours PRN  heparin   Injectable 5000 Unit(s) SubCutaneous every 8 hours  influenza   Vaccine 0.5 milliLiter(s) IntraMuscular once  insulin lispro (ADMELOG) corrective regimen sliding scale   SubCutaneous three times a day before meals  insulin lispro (ADMELOG) corrective regimen sliding scale   SubCutaneous at bedtime  levothyroxine Injectable 94 MICROGram(s) IV Push at bedtime  melatonin 3 milliGRAM(s) Oral at bedtime PRN  mupirocin 2% Ointment 1 Application(s) Topical three times a day  ondansetron Injectable 4 milliGRAM(s) IV Push every 8 hours PRN  pantoprazole  Injectable 40 milliGRAM(s) IV Push daily  piperacillin/tazobactam IVPB.. 3.375 Gram(s) IV Intermittent every 12 hours  sodium chloride 3%. 500 milliLiter(s) IV Continuous <Continuous>  sodium zirconium cyclosilicate 10 Gram(s) Oral daily      REVIEW OF SYSTEMS:  CONSTITUTIONAL: No weakness, fevers or chills  EYES/ENT: No visual changes;  No dysphagia  NECK: No pain or stiffness  RESPIRATORY: No cough, wheezing, hemoptysis; No shortness of breath  CARDIOVASCULAR: No chest pain or palpitations; No lower extremity edema  GASTROINTESTINAL: No abdominal or epigastric pain. No nausea, vomiting, or hematemesis; No diarrhea or constipation. No melena or hematochezia.  BACK: No back pain  GENITOURINARY: No dysuria, frequency or hematuria  NEUROLOGICAL: No numbness or weakness  SKIN: No itching, burning, rashes, or lesions   All other review of systems is negative unless indicated above.    Physical Exam:  T(F): 97.8 (01-22), Max: 98.2 (01-21)  HR: 84 (01-22) (77 - 105)  BP: 145/73 (01-22) (140/74 - 153/69)  BP(mean): --  ABP: --  ABP(mean): --  RR: 18 (01-22)  SpO2: 96% (01-22)  GENERAL: No acute distress, well-developed  HEAD:  Atraumatic, Normocephalic  ENT: EOMI, PERRLA, conjunctiva and sclera clear, Neck supple, No JVD, moist mucosa  CHEST/LUNG: Clear to auscultation bilaterally; No wheeze, equal breath sounds bilaterally   BACK: No spinal tenderness  HEART: Regular rate and rhythm; No murmurs, rubs, or gallops  ABDOMEN: Soft, Nontender, Nondistended; Bowel sounds present  EXTREMITIES:  No clubbing, cyanosis, or edema  PSYCH: Nl behavior, nl affect  NEUROLOGY: AAOx3, non-focal, cranial nerves intact  SKIN: Normal color, No rashes or lesions      Cardiovascular Diagnostic Testing: personally reviewed    CXR: Personally reviewed    Labs: Personally reviewed                        7.8    9.63  )-----------( 79       ( 22 Jan 2023 03:45 )             22.1     01-22    122<L>  |  91<L>  |  44<H>  ----------------------------<  139<H>  5.5<H>   |  16<L>  |  3.18<H>    Ca    8.9      22 Jan 2023 03:45  Phos  5.0     01-22  Mg     1.90     01-22    TPro  6.2  /  Alb  3.2<L>  /  TBili  1.1  /  DBili  x   /  AST  85<H>  /  ALT  84<H>  /  AlkPhos  432<H>  01-21    PT/INR - ( 20 Jan 2023 19:51 )   PT: 14.7 sec;   INR: 1.26 ratio         PTT - ( 20 Jan 2023 19:51 )  PTT:51.9 sec    CARDIAC MARKERS ( 17 Jan 2023 00:50 )  27 ng/L / x     / x     / x     / x     / x      CARDIAC MARKERS ( 16 Jan 2023 20:15 )  26 ng/L / x     / x     / x     / x     / x            Serum Pro-Brain Natriuretic Peptide: 1654 pg/mL (01-16 @ 20:15)        Thyroid Stimulating Hormone, Serum: 1.35 uIU/mL (01-21 @ 04:50)  Thyroid Stimulating Hormone, Serum: 2.51 uIU/mL (01-18 @ 03:50)  Thyroid Stimulating Hormone, Serum: 2.56 uIU/mL (01-16 @ 20:15)

## 2023-01-22 NOTE — PROGRESS NOTE ADULT - PROBLEM SELECTOR PLAN 7
Patient with two recent hospitalizations, one at [facility] and one at OSH for myxedema coma   - Endo following, currently no evidence of myxedema   - TSH wnl   - C/w iv synthroid

## 2023-01-23 LAB
ANION GAP SERPL CALC-SCNC: 17 MMOL/L — HIGH (ref 7–14)
BUN SERPL-MCNC: 44 MG/DL — HIGH (ref 7–23)
CALCIUM SERPL-MCNC: 9 MG/DL — SIGNIFICANT CHANGE UP (ref 8.4–10.5)
CHLORIDE SERPL-SCNC: 89 MMOL/L — LOW (ref 98–107)
CO2 SERPL-SCNC: 21 MMOL/L — LOW (ref 22–31)
CREAT SERPL-MCNC: 3.1 MG/DL — HIGH (ref 0.5–1.3)
EGFR: 17 ML/MIN/1.73M2 — LOW
GLUCOSE BLDC GLUCOMTR-MCNC: 111 MG/DL — HIGH (ref 70–99)
GLUCOSE BLDC GLUCOMTR-MCNC: 167 MG/DL — HIGH (ref 70–99)
GLUCOSE BLDC GLUCOMTR-MCNC: 176 MG/DL — HIGH (ref 70–99)
GLUCOSE BLDC GLUCOMTR-MCNC: 198 MG/DL — HIGH (ref 70–99)
GLUCOSE BLDC GLUCOMTR-MCNC: 219 MG/DL — HIGH (ref 70–99)
GLUCOSE SERPL-MCNC: 182 MG/DL — HIGH (ref 70–99)
HCT VFR BLD CALC: 21.4 % — LOW (ref 34.5–45)
HGB BLD-MCNC: 7.3 G/DL — LOW (ref 11.5–15.5)
MAGNESIUM SERPL-MCNC: 1.7 MG/DL — SIGNIFICANT CHANGE UP (ref 1.6–2.6)
MCHC RBC-ENTMCNC: 26.6 PG — LOW (ref 27–34)
MCHC RBC-ENTMCNC: 34.1 GM/DL — SIGNIFICANT CHANGE UP (ref 32–36)
MCV RBC AUTO: 78.1 FL — LOW (ref 80–100)
NRBC # BLD: 0 /100 WBCS — SIGNIFICANT CHANGE UP (ref 0–0)
NRBC # FLD: 0.05 K/UL — HIGH (ref 0–0)
PHOSPHATE SERPL-MCNC: 5.1 MG/DL — HIGH (ref 2.5–4.5)
PLATELET # BLD AUTO: 69 K/UL — LOW (ref 150–400)
POTASSIUM SERPL-MCNC: 3.9 MMOL/L — SIGNIFICANT CHANGE UP (ref 3.5–5.3)
POTASSIUM SERPL-SCNC: 3.9 MMOL/L — SIGNIFICANT CHANGE UP (ref 3.5–5.3)
RBC # BLD: 2.74 M/UL — LOW (ref 3.8–5.2)
RBC # FLD: 15.3 % — HIGH (ref 10.3–14.5)
SODIUM SERPL-SCNC: 127 MMOL/L — LOW (ref 135–145)
WBC # BLD: 10 K/UL — SIGNIFICANT CHANGE UP (ref 3.8–10.5)
WBC # FLD AUTO: 10 K/UL — SIGNIFICANT CHANGE UP (ref 3.8–10.5)

## 2023-01-23 PROCEDURE — 99232 SBSQ HOSP IP/OBS MODERATE 35: CPT

## 2023-01-23 PROCEDURE — 99233 SBSQ HOSP IP/OBS HIGH 50: CPT

## 2023-01-23 PROCEDURE — 93451 RIGHT HEART CATH: CPT | Mod: 26

## 2023-01-23 PROCEDURE — 99233 SBSQ HOSP IP/OBS HIGH 50: CPT | Mod: GC

## 2023-01-23 RX ORDER — INSULIN GLARGINE 100 [IU]/ML
2 INJECTION, SOLUTION SUBCUTANEOUS AT BEDTIME
Refills: 0 | Status: DISCONTINUED | OUTPATIENT
Start: 2023-01-23 | End: 2023-01-26

## 2023-01-23 RX ORDER — INSULIN LISPRO 100/ML
2 VIAL (ML) SUBCUTANEOUS
Refills: 0 | Status: DISCONTINUED | OUTPATIENT
Start: 2023-01-23 | End: 2023-01-25

## 2023-01-23 RX ADMIN — BUMETANIDE 2 MILLIGRAM(S): 0.25 INJECTION INTRAMUSCULAR; INTRAVENOUS at 05:30

## 2023-01-23 RX ADMIN — BUMETANIDE 2 MILLIGRAM(S): 0.25 INJECTION INTRAMUSCULAR; INTRAVENOUS at 17:31

## 2023-01-23 RX ADMIN — HEPARIN SODIUM 5000 UNIT(S): 5000 INJECTION INTRAVENOUS; SUBCUTANEOUS at 05:30

## 2023-01-23 RX ADMIN — Medication 3 MILLILITER(S): at 04:06

## 2023-01-23 RX ADMIN — PIPERACILLIN AND TAZOBACTAM 25 GRAM(S): 4; .5 INJECTION, POWDER, LYOPHILIZED, FOR SOLUTION INTRAVENOUS at 05:30

## 2023-01-23 RX ADMIN — PANTOPRAZOLE SODIUM 40 MILLIGRAM(S): 20 TABLET, DELAYED RELEASE ORAL at 17:27

## 2023-01-23 RX ADMIN — Medication 2 UNIT(S): at 17:32

## 2023-01-23 RX ADMIN — Medication 3 MILLILITER(S): at 21:19

## 2023-01-23 RX ADMIN — MUPIROCIN 1 APPLICATION(S): 20 OINTMENT TOPICAL at 05:30

## 2023-01-23 RX ADMIN — INSULIN GLARGINE 2 UNIT(S): 100 INJECTION, SOLUTION SUBCUTANEOUS at 21:39

## 2023-01-23 RX ADMIN — MUPIROCIN 1 APPLICATION(S): 20 OINTMENT TOPICAL at 21:40

## 2023-01-23 RX ADMIN — Medication 3 MILLILITER(S): at 09:38

## 2023-01-23 RX ADMIN — HEPARIN SODIUM 5000 UNIT(S): 5000 INJECTION INTRAVENOUS; SUBCUTANEOUS at 17:26

## 2023-01-23 RX ADMIN — Medication 94 MICROGRAM(S): at 21:40

## 2023-01-23 RX ADMIN — MUPIROCIN 1 APPLICATION(S): 20 OINTMENT TOPICAL at 17:26

## 2023-01-23 RX ADMIN — Medication 1: at 16:49

## 2023-01-23 RX ADMIN — PIPERACILLIN AND TAZOBACTAM 25 GRAM(S): 4; .5 INJECTION, POWDER, LYOPHILIZED, FOR SOLUTION INTRAVENOUS at 18:42

## 2023-01-23 NOTE — PROGRESS NOTE ADULT - PROBLEM SELECTOR PLAN 2
Pt. with hyponatremia in the setting of fluid overload. SNa low/improved to 127 today. Alfie is at 47, U Osm is low at 194, serum cortisol is elevated at 21. Recommend to continue with diuretic therapy, as above. Restrict fluid intake <1L per day. Monitor SNa q12.

## 2023-01-23 NOTE — PROGRESS NOTE ADULT - PROBLEM SELECTOR PLAN 4
Na improved to 127 today.   - Suspect multifactorial in setting of volume overload, SIADH  - receiving IV bumex, escalated per nephrology recommendations  - received an infusion of 3% saline 1/20  - Is/Os  - trend BMP regularly; goal correction of Na 4-6 mEq/24h, do not exceed >8 mEq correction in 24h

## 2023-01-23 NOTE — PROGRESS NOTE ADULT - PROBLEM SELECTOR PLAN 3
Pt. with hyperkalemia in the setting of ISAMAR. Serum potassium WNL at 3.9 today. Recommend to continue Lokelma 10 gm qd. Low potassium diet. Monitor serum potassium.      If you have any questions, please feel free to contact me  Chuck Harvey  Nephrology Fellow  [phone]/ [employer] Teams(Preferred)  (After 5pm or on weekends please page the on-call fellow).

## 2023-01-23 NOTE — PROGRESS NOTE ADULT - PROBLEM SELECTOR PLAN 7
Patient with two recent hospitalizations, one at [facility] and one at OSH for myxedema coma   - Endo following, currently no evidence of myxedema   - TSH wnl   - C/w iv synthroid

## 2023-01-23 NOTE — PROGRESS NOTE ADULT - SUBJECTIVE AND OBJECTIVE BOX
Chief Complaint: DM2, hypothyroid    History: episode of nosebleed at time of visit  NPO for R heart cath  no hypoglycemia events  hypoglycemia noted yesterday    MEDICATIONS  (STANDING):  albuterol/ipratropium for Nebulization 3 milliLiter(s) Nebulizer every 6 hours  buMETAnide Injectable 2 milliGRAM(s) IV Push every 8 hours  dextrose 5%. 1000 milliLiter(s) (50 mL/Hr) IV Continuous <Continuous>  dextrose 5%. 1000 milliLiter(s) (100 mL/Hr) IV Continuous <Continuous>  dextrose 50% Injectable 25 Gram(s) IV Push once  dextrose 50% Injectable 12.5 Gram(s) IV Push once  dextrose 50% Injectable 25 Gram(s) IV Push once  glucagon  Injectable 1 milliGRAM(s) IntraMuscular once  heparin   Injectable 5000 Unit(s) SubCutaneous every 8 hours  influenza   Vaccine 0.5 milliLiter(s) IntraMuscular once  insulin lispro (ADMELOG) corrective regimen sliding scale   SubCutaneous three times a day before meals  insulin lispro (ADMELOG) corrective regimen sliding scale   SubCutaneous at bedtime  levothyroxine Injectable 94 MICROGram(s) IV Push at bedtime  mupirocin 2% Ointment 1 Application(s) Topical three times a day  pantoprazole  Injectable 40 milliGRAM(s) IV Push daily  piperacillin/tazobactam IVPB.. 3.375 Gram(s) IV Intermittent every 12 hours  sodium chloride 3%. 500 milliLiter(s) (30 mL/Hr) IV Continuous <Continuous>  sodium zirconium cyclosilicate 10 Gram(s) Oral daily    MEDICATIONS  (PRN):  acetaminophen     Tablet .. 650 milliGRAM(s) Oral every 6 hours PRN Temp greater or equal to 38C (100.4F), Mild Pain (1 - 3)  aluminum hydroxide/magnesium hydroxide/simethicone Suspension 30 milliLiter(s) Oral every 4 hours PRN Dyspepsia  dextrose Oral Gel 15 Gram(s) Oral once PRN Blood Glucose LESS THAN 70 milliGRAM(s)/deciliter  guaiFENesin Oral Liquid (Sugar-Free) 200 milliGRAM(s) Oral every 6 hours PRN Cough  melatonin 3 milliGRAM(s) Oral at bedtime PRN Insomnia  ondansetron Injectable 4 milliGRAM(s) IV Push every 8 hours PRN Nausea and/or Vomiting      Allergies    No Known Allergies    Intolerances      Review of Systems:    ALL OTHER SYSTEMS REVIEWED AND NEGATIVE        PHYSICAL EXAM:  VITALS: T(C): 36.3 (01-23-23 @ 12:02)  T(F): 97.3 (01-23-23 @ 12:02), Max: 98.6 (01-22-23 @ 21:39)  HR: 84 (01-23-23 @ 12:02) (72 - 84)  BP: 147/64 (01-23-23 @ 12:02) (147/64 - 177/78)  RR:  (17 - 18)  SpO2:  (78% - 100%)  Wt(kg): --  GENERAL: NAD, well-groomed, well-developed  EYES: No proptosis, no lid lag, anicteric  HEENT:  Atraumatic, Normocephalic, + epistaxis  RESPIRATORY: O2 nasal cannula  PSYCH: Alert and oriented x 3, normal affect, normal mood    CAPILLARY BLOOD GLUCOSE  219 (23 Jan 2023 06:02)      POCT Blood Glucose.: 198 mg/dL (23 Jan 2023 11:57)  POCT Blood Glucose.: 219 mg/dL (23 Jan 2023 06:02)  POCT Blood Glucose.: 313 mg/dL (22 Jan 2023 22:03)  POCT Blood Glucose.: 217 mg/dL (22 Jan 2023 17:05)      01-23    127<L>  |  89<L>  |  44<H>  ----------------------------<  182<H>  3.9   |  21<L>  |  3.10<H>    eGFR: 17<L>    Ca    9.0      01-23  Mg     1.70     01-23  Phos  5.1     01-23    TPro  6.2  /  Alb  3.2<L>  /  TBili  1.1  /  DBili  x   /  AST  85<H>  /  ALT  84<H>  /  AlkPhos  432<H>  01-21      A1C with Estimated Average Glucose Result: 6.9 % (01-17-23 @ 07:10)  A1C with Estimated Average Glucose Result: 9.7 % (11-25-22 @ 14:20)  A1C with Estimated Average Glucose Result: 9.1 % (09-29-22 @ 06:00)  A1C with Estimated Average Glucose Result: 9.2 % (09-28-22 @ 07:47)      Thyroid Function Tests:  01-21 @ 04:50 TSH 1.35 FreeT4 -- T3 -- Anti TPO -- Anti Thyroglobulin Ab -- TSI --  01-20 @ 06:04 TSH -- FreeT4 1.6 T3 -- Anti TPO -- Anti Thyroglobulin Ab -- TSI --

## 2023-01-23 NOTE — PROGRESS NOTE ADULT - SUBJECTIVE AND OBJECTIVE BOX
CHIEF COMPLAINT:Patient is a 57y old  Female who presents with a chief complaint of Respiratory distress (23 Jan 2023 14:20)      Interval Events: No interval events. Pt seen and evaluated at bedside    REVIEW OF SYSTEMS:  [x] All other systems negative except per HPI   [ ] Unable to assess ROS because ________    OBJECTIVE:  ICU Vital Signs Last 24 Hrs  T(C): 36.3 (23 Jan 2023 12:02), Max: 37 (22 Jan 2023 21:39)  T(F): 97.3 (23 Jan 2023 12:02), Max: 98.6 (22 Jan 2023 21:39)  HR: 65 (23 Jan 2023 17:25) (65 - 84)  BP: 160/71 (23 Jan 2023 17:25) (147/64 - 177/78)  BP(mean): --  ABP: --  ABP(mean): --  RR: 17 (23 Jan 2023 12:02) (17 - 18)  SpO2: 100% (23 Jan 2023 12:02) (78% - 100%)    O2 Parameters below as of 23 Jan 2023 12:02  Patient On (Oxygen Delivery Method): nasal cannula              01-22 @ 07:01 - 01-23 @ 07:00  --------------------------------------------------------  IN: 890 mL / OUT: 1375 mL / NET: -485 mL    01-23 @ 07:01 - 01-23 @ 18:33  --------------------------------------------------------  IN: 200 mL / OUT: 1200 mL / NET: -1000 mL        PHYSICAL EXAM:  GENERAL: NAD, well-groomed, well-developed  HEAD:  Atraumatic, Normocephalic  EYES: EOMI, PERRLA, conjunctiva and sclera clear  ENMT: No tonsillar erythema, exudates, or enlargement; Moist mucous membranes, Good dentition, No lesions  NECK: Supple, No JVD, Normal thyroid  CHEST/LUNG: Clear to auscultation bilaterally; No rales, rhonchi, wheezing, or rubs  HEART: Regular rate and rhythm; No murmurs, rubs, or gallops  ABDOMEN: Soft, Nontender, Nondistended; Bowel sounds present  VASCULAR:  2+ Peripheral Pulses, No clubbing, cyanosis, or edema  LYMPH: No lymphadenopathy noted  SKIN: No rashes or lesions  NERVOUS SYSTEM:  Alert & Oriented X3, Good concentration; Motor Strength 5/5 B/L upper and lower extremities; DTRs 2+ intact and symmetric    HOSPITAL MEDICATIONS:  MEDICATIONS  (STANDING):  albuterol/ipratropium for Nebulization 3 milliLiter(s) Nebulizer every 6 hours  buMETAnide Injectable 2 milliGRAM(s) IV Push every 8 hours  dextrose 5%. 1000 milliLiter(s) (50 mL/Hr) IV Continuous <Continuous>  dextrose 5%. 1000 milliLiter(s) (100 mL/Hr) IV Continuous <Continuous>  dextrose 50% Injectable 25 Gram(s) IV Push once  dextrose 50% Injectable 12.5 Gram(s) IV Push once  dextrose 50% Injectable 25 Gram(s) IV Push once  glucagon  Injectable 1 milliGRAM(s) IntraMuscular once  heparin   Injectable 5000 Unit(s) SubCutaneous every 8 hours  influenza   Vaccine 0.5 milliLiter(s) IntraMuscular once  insulin glargine Injectable (LANTUS) 2 Unit(s) SubCutaneous at bedtime  insulin lispro (ADMELOG) corrective regimen sliding scale   SubCutaneous at bedtime  insulin lispro (ADMELOG) corrective regimen sliding scale   SubCutaneous three times a day before meals  insulin lispro Injectable (ADMELOG) 2 Unit(s) SubCutaneous three times a day before meals  levothyroxine Injectable 94 MICROGram(s) IV Push at bedtime  mupirocin 2% Ointment 1 Application(s) Topical three times a day  pantoprazole  Injectable 40 milliGRAM(s) IV Push daily  piperacillin/tazobactam IVPB.. 3.375 Gram(s) IV Intermittent every 12 hours  sodium chloride 3%. 500 milliLiter(s) (30 mL/Hr) IV Continuous <Continuous>  sodium zirconium cyclosilicate 10 Gram(s) Oral daily    MEDICATIONS  (PRN):  acetaminophen     Tablet .. 650 milliGRAM(s) Oral every 6 hours PRN Temp greater or equal to 38C (100.4F), Mild Pain (1 - 3)  aluminum hydroxide/magnesium hydroxide/simethicone Suspension 30 milliLiter(s) Oral every 4 hours PRN Dyspepsia  dextrose Oral Gel 15 Gram(s) Oral once PRN Blood Glucose LESS THAN 70 milliGRAM(s)/deciliter  guaiFENesin Oral Liquid (Sugar-Free) 200 milliGRAM(s) Oral every 6 hours PRN Cough  melatonin 3 milliGRAM(s) Oral at bedtime PRN Insomnia  ondansetron Injectable 4 milliGRAM(s) IV Push every 8 hours PRN Nausea and/or Vomiting      LABS:    The Labs were reviewed by me   The Radiology was reviewed by me    EKG tracing reviewed by me    01-23    127<L>  |  89<L>  |  44<H>  ----------------------------<  182<H>  3.9   |  21<L>  |  3.10<H>  01-22    122<L>  |  91<L>  |  44<H>  ----------------------------<  139<H>  5.5<H>   |  16<L>  |  3.18<H>  01-21    122<L>  |  90<L>  |  40<H>  ----------------------------<  157<H>  5.4<H>   |  17<L>  |  3.19<H>    Ca    9.0      23 Jan 2023 05:30  Ca    8.9      22 Jan 2023 03:45  Ca    8.8      21 Jan 2023 16:52  Phos  5.1     01-23  Mg     1.70     01-23    TPro  6.2  /  Alb  3.2<L>  /  TBili  1.1  /  DBili  x   /  AST  85<H>  /  ALT  84<H>  /  AlkPhos  432<H>  01-21    Magnesium, Serum: 1.70 mg/dL (01-23-23 @ 05:30)  Magnesium, Serum: 1.90 mg/dL (01-22-23 @ 03:45)  Magnesium, Serum: 2.00 mg/dL (01-21-23 @ 16:52)  Magnesium, Serum: 2.00 mg/dL (01-21-23 @ 04:50)  Magnesium, Serum: 2.10 mg/dL (01-20-23 @ 19:51)    Phosphorus Level, Serum: 5.1 mg/dL (01-23-23 @ 05:30)  Phosphorus Level, Serum: 5.0 mg/dL (01-22-23 @ 03:45)  Phosphorus Level, Serum: 4.6 mg/dL (01-21-23 @ 16:52)  Phosphorus Level, Serum: 5.0 mg/dL (01-21-23 @ 04:50)  Phosphorus Level, Serum: 4.7 mg/dL (01-20-23 @ 19:51)                                              7.3    10.00 )-----------( 69       ( 23 Jan 2023 05:30 )             21.4                         7.8    9.63  )-----------( 79       ( 22 Jan 2023 03:45 )             22.1                         7.8    12.02 )-----------( 81       ( 21 Jan 2023 16:52 )             23.3     CAPILLARY BLOOD GLUCOSE  219 (23 Jan 2023 06:02)      POCT Blood Glucose.: 176 mg/dL (23 Jan 2023 17:04)  POCT Blood Glucose.: 167 mg/dL (23 Jan 2023 16:27)  POCT Blood Glucose.: 198 mg/dL (23 Jan 2023 11:57)  POCT Blood Glucose.: 219 mg/dL (23 Jan 2023 06:02)  POCT Blood Glucose.: 313 mg/dL (22 Jan 2023 22:03)        MICROBIOLOGY:     RADIOLOGY:  [x] Reviewed and interpreted by me    Point of Care Ultrasound Findings:    PFT:    EKG:    RHC:  < from: Cardiac Catheterization (01.23.23 @ 14:32) >  RA 17 mmHg. PA 58/27/41 mmHg. PCW 22 mmHg.   CO 8.79 L/min (Hgb 7.7). CI 5.33 L/min/m2.  dsc. PVR 2.16 Kaur.     < end of copied text >

## 2023-01-23 NOTE — PROGRESS NOTE ADULT - SUBJECTIVE AND OBJECTIVE BOX
Hospitalist Progress Note  Sophie Ferreira MD Pager # [phone]    OVERNIGHT EVENTS: LAURYN    SUBJECTIVE / INTERVAL HPI: Patient seen and examined at bedside.     VITAL SIGNS:  Vital Signs Last 24 Hrs  T(C): 36.3 (23 Jan 2023 12:02), Max: 37 (22 Jan 2023 21:39)  T(F): 97.3 (23 Jan 2023 12:02), Max: 98.6 (22 Jan 2023 21:39)  HR: 84 (23 Jan 2023 12:02) (72 - 84)  BP: 147/64 (23 Jan 2023 12:02) (147/64 - 177/78)  BP(mean): --  RR: 17 (23 Jan 2023 12:02) (17 - 18)  SpO2: 100% (23 Jan 2023 12:02) (78% - 100%)    Parameters below as of 23 Jan 2023 12:02  Patient On (Oxygen Delivery Method): nasal cannula        PHYSICAL EXAM:    General: WDWN  HEENT: NC/AT; PERRL, anicteric sclera; MMM  Neck: supple  Cardiovascular: +S1/S2; RRR  Respiratory: CTA B/L; no W/R/R  Gastrointestinal: soft, NT/ND; +BSx4  Extremities: WWP; no edema, clubbing or cyanosis  Vascular: 2+ radial, DP/PT pulses B/L  Neurological: AAOx3; no focal deficits    MEDICATIONS:  MEDICATIONS  (STANDING):  albuterol/ipratropium for Nebulization 3 milliLiter(s) Nebulizer every 6 hours  buMETAnide Injectable 2 milliGRAM(s) IV Push every 8 hours  dextrose 5%. 1000 milliLiter(s) (50 mL/Hr) IV Continuous <Continuous>  dextrose 5%. 1000 milliLiter(s) (100 mL/Hr) IV Continuous <Continuous>  dextrose 50% Injectable 25 Gram(s) IV Push once  dextrose 50% Injectable 12.5 Gram(s) IV Push once  dextrose 50% Injectable 25 Gram(s) IV Push once  glucagon  Injectable 1 milliGRAM(s) IntraMuscular once  heparin   Injectable 5000 Unit(s) SubCutaneous every 8 hours  influenza   Vaccine 0.5 milliLiter(s) IntraMuscular once  insulin lispro (ADMELOG) corrective regimen sliding scale   SubCutaneous three times a day before meals  insulin lispro (ADMELOG) corrective regimen sliding scale   SubCutaneous at bedtime  levothyroxine Injectable 94 MICROGram(s) IV Push at bedtime  mupirocin 2% Ointment 1 Application(s) Topical three times a day  pantoprazole  Injectable 40 milliGRAM(s) IV Push daily  piperacillin/tazobactam IVPB.. 3.375 Gram(s) IV Intermittent every 12 hours  sodium chloride 3%. 500 milliLiter(s) (30 mL/Hr) IV Continuous <Continuous>  sodium zirconium cyclosilicate 10 Gram(s) Oral daily    MEDICATIONS  (PRN):  acetaminophen     Tablet .. 650 milliGRAM(s) Oral every 6 hours PRN Temp greater or equal to 38C (100.4F), Mild Pain (1 - 3)  aluminum hydroxide/magnesium hydroxide/simethicone Suspension 30 milliLiter(s) Oral every 4 hours PRN Dyspepsia  dextrose Oral Gel 15 Gram(s) Oral once PRN Blood Glucose LESS THAN 70 milliGRAM(s)/deciliter  guaiFENesin Oral Liquid (Sugar-Free) 200 milliGRAM(s) Oral every 6 hours PRN Cough  melatonin 3 milliGRAM(s) Oral at bedtime PRN Insomnia  ondansetron Injectable 4 milliGRAM(s) IV Push every 8 hours PRN Nausea and/or Vomiting      ALLERGIES:  Allergies    No Known Allergies    Intolerances        LABS:                        7.3    10.00 )-----------( 69       ( 23 Jan 2023 05:30 )             21.4     01-23    127<L>  |  89<L>  |  44<H>  ----------------------------<  182<H>  3.9   |  21<L>  |  3.10<H>    Ca    9.0      23 Jan 2023 05:30  Phos  5.1     01-23  Mg     1.70     01-23          CAPILLARY BLOOD GLUCOSE  219 (23 Jan 2023 06:02)      POCT Blood Glucose.: 198 mg/dL (23 Jan 2023 11:57)      RADIOLOGY & ADDITIONAL TESTS: Reviewed.    ASSESSMENT:    PLAN:

## 2023-01-23 NOTE — PROGRESS NOTE ADULT - SUBJECTIVE AND OBJECTIVE BOX
[facility] DIVISION OF KIDNEY DISEASES AND HYPERTENSION -- FOLLOW UP NOTE  --------------------------------------------------------------------------------  HPI: 57-year-old female with history of DM, HTN, CHF, CKD, hypothyroidism, PAH, asthma, and HLD admitted at [facility] on 1/18/23 for SOB/pneumonia. Pt. being seen for ISAMAR on CKD and hyponatremia.    24 hour events/subjective: Pt. seen and examined this morning. Reports feeling well, endorses no complaints. Currently on NC.  No CP, SOB, abdominal pain, HA or dizziness.    PAST HISTORY  --------------------------------------------------------------------------------  No significant changes to PMH, PSH, FHx, SHx, unless otherwise noted    ALLERGIES & MEDICATIONS  --------------------------------------------------------------------------------  Allergies    No Known Allergies    Intolerances    Standing Inpatient Medications  albuterol/ipratropium for Nebulization 3 milliLiter(s) Nebulizer every 6 hours  buMETAnide Injectable 2 milliGRAM(s) IV Push every 8 hours  dextrose 5%. 1000 milliLiter(s) IV Continuous <Continuous>  dextrose 5%. 1000 milliLiter(s) IV Continuous <Continuous>  dextrose 50% Injectable 25 Gram(s) IV Push once  dextrose 50% Injectable 12.5 Gram(s) IV Push once  dextrose 50% Injectable 25 Gram(s) IV Push once  glucagon  Injectable 1 milliGRAM(s) IntraMuscular once  heparin   Injectable 5000 Unit(s) SubCutaneous every 8 hours  influenza   Vaccine 0.5 milliLiter(s) IntraMuscular once  insulin lispro (ADMELOG) corrective regimen sliding scale   SubCutaneous three times a day before meals  insulin lispro (ADMELOG) corrective regimen sliding scale   SubCutaneous at bedtime  levothyroxine Injectable 94 MICROGram(s) IV Push at bedtime  mupirocin 2% Ointment 1 Application(s) Topical three times a day  pantoprazole  Injectable 40 milliGRAM(s) IV Push daily  piperacillin/tazobactam IVPB.. 3.375 Gram(s) IV Intermittent every 12 hours  sodium chloride 3%. 500 milliLiter(s) IV Continuous <Continuous>  sodium zirconium cyclosilicate 10 Gram(s) Oral daily    PRN Inpatient Medications  acetaminophen     Tablet .. 650 milliGRAM(s) Oral every 6 hours PRN  aluminum hydroxide/magnesium hydroxide/simethicone Suspension 30 milliLiter(s) Oral every 4 hours PRN  dextrose Oral Gel 15 Gram(s) Oral once PRN  guaiFENesin Oral Liquid (Sugar-Free) 200 milliGRAM(s) Oral every 6 hours PRN  melatonin 3 milliGRAM(s) Oral at bedtime PRN  ondansetron Injectable 4 milliGRAM(s) IV Push every 8 hours PRN    REVIEW OF SYSTEMS  --------------------------------------------------------------------------------  Gen: No fevers/chills   Respiratory: No dyspnea   CV: No chest pain   GI: No abdominal pain   MSK: LE edema   Neuro: No dizziness  All other systems were reviewed and are negative, except as noted.    VITALS/PHYSICAL EXAM  --------------------------------------------------------------------------------  T(C): 36.3 (01-23-23 @ 12:02), Max: 37 (01-22-23 @ 21:39)  HR: 84 (01-23-23 @ 12:02) (72 - 84)  BP: 147/64 (01-23-23 @ 12:02) (147/64 - 177/78)  RR: 17 (01-23-23 @ 12:02) (17 - 18)  SpO2: 100% (01-23-23 @ 12:02) (78% - 100%)  Wt(kg): --    01-22-23 @ 07:01  -  01-23-23 @ 07:00  --------------------------------------------------------  IN: 890 mL / OUT: 1375 mL / NET: -485 mL    Physical Exam:  	Gen: NAD  	HEENT: MMM, on NC  	Pulm: CTA B/L  	CV: S1S2  	Abd: Soft, +BS   	Ext: No LE edema B/L  	Neuro: Awake  	Skin: Warm and dry  	Vascular access: IV peripheral canula    LABS/STUDIES  --------------------------------------------------------------------------------              7.3    10.00 >-----------<  69       [01-23-23 @ 05:30]              21.4     127  |  89  |  44  ----------------------------<  182      [01-23-23 @ 05:30]  3.9   |  21  |  3.10        Ca     9.0     [01-23-23 @ 05:30]      Mg     1.70     [01-23-23 @ 05:30]      Phos  5.1     [01-23-23 @ 05:30]    Creatinine Trend:  SCr 3.10 [01-23 @ 05:30]  SCr 3.18 [01-22 @ 03:45]  SCr 3.19 [01-21 @ 16:52]  SCr 2.90 [01-21 @ 04:50]  SCr 2.77 [01-20 @ 19:51]    Urinalysis - [01-18-23 @ 05:58]      Color Colorless / Appearance Clear / SG 1.011 / pH 6.0      Gluc Trace / Ketone Negative  / Bili Negative / Urobili <2 mg/dL       Blood Trace / Protein 30 mg/dL / Leuk Est Negative / Nitrite Negative      RBC 4 / WBC 3 / Hyaline 1 / Gran  / Sq Epi  / Non Sq Epi 3 / Bacteria Negative    Urine Creatinine 23      [01-19-23 @ 10:28]  Urine Protein 116      [01-19-23 @ 10:28]  Urine Sodium 47      [01-18-23 @ 05:58]  Urine Urea Nitrogen 114.0      [01-18-23 @ 05:58]  Urine Potassium 14.2      [01-18-23 @ 05:58]  Urine Chloride 45      [01-17-23 @ 09:00]  Urine Phosphorus 33.9      [01-17-23 @ 09:00]  Urine Osmolality 194      [01-18-23 @ 05:58]    Iron 42, TIBC 306, %sat 14      [01-17-23 @ 07:10]  Ferritin 729      [01-17-23 @ 07:10]  TSH 1.35      [01-21-23 @ 04:50]  Lipid: chol 107, TG 28, HDL 61, LDL --      [11-25-22 @ 21:00]    HBsAg Nonreact      [01-20-23 @ 19:51]  HCV 0.09, Nonreact      [01-20-23 @ 19:51]  HIV Nonreact      [01-20-23 @ 19:51]    STEVO: titer Negative, pattern --      [01-19-23 @ 05:02]  dsDNA <12      [01-19-23 @ 05:02]  C3 Complement 128      [01-19-23 @ 05:02]  C4 Complement 46      [01-19-23 @ 05:02]  Rheumatoid Factor 10      [01-19-23 @ 05:02]  ANCA: cANCA Negative, pANCA Negative, atypical ANCA Indeterminate Method interference due to STEVO Fluorescence      [01-19-23 @ 05:02]  anti-GBM <0.2      [01-19-23 @ 05:02]  Free Light Chains: kappa 8.92, lambda 3.79, ratio = 2.35      [01-19 @ 05:02]  SPEP Interpretation: Increased Beta fraction, possibly transferrin increase. HUMPHREY Mccrary MD      [01-19-23 @ 05:02]

## 2023-01-23 NOTE — PROGRESS NOTE ADULT - PROBLEM SELECTOR PLAN 1
Pt. with ISAMAR on CKD in the setting of infection and recent IV contrast use. CT scan with IV contrast was done on 1/17. Scr was elevated at 1.52 on 1/18. Scr is elevated/stable at 3.10 today (1/23/23). UOP ~1.4 L in last 24 hrs. Spot urine TP/CR ratio was significantly elevated at 5.9. C3 and C4 were not low. Testing for HIV was negative. STEVO, HBsAg and Hep C Ab were nonreactive/negative and negative ANCA levels. Awaiting serum VEE. Pt. with LE edema/fluid overload, on IV Bumex therapy. Recommend to continue with IV Bumex 2 mg IV TID. Cardiology note from 1/20/23 reviewed. Monitor labs and urine output. Avoid any potential nephrotoxins. Dose medications as per eGFR.

## 2023-01-23 NOTE — CHART NOTE - NSCHARTNOTEFT_GEN_A_CORE
Patient status post RHC via right brachial access. Site clean, dry, and intact. No hematoma or active bleeding. No evidence of active bleeding noted. Extremities warm to touch. Pulses present b/l, capillary refill appropriate. Denies any pain, numbness or tingling. Will continue to monitor.    T(C): 36.3 (01-23-23 @ 12:02), Max: 36.5 (01-23-23 @ 05:30)  HR: 85 (01-23-23 @ 21:39) (65 - 87)  BP: 160/71 (01-23-23 @ 17:25) (147/64 - 170/79)  RR: 17 (01-23-23 @ 12:02) (17 - 17)  SpO2: 100% (01-23-23 @ 21:39) (98% - 100%)      Minesh Joseph PA-C  Department of Medicine  [facility]   In House Page [phone]

## 2023-01-23 NOTE — PROGRESS NOTE ADULT - ASSESSMENT
57F with hypothyroidism, HTN, T2DM, HLD, CKD, PAH, asthma on albuterol presented to the ED w/respiratory distress. Endocrine consulted for hypothyroidism and DM2.    Hypothyroidism, not in myxedema  Taking Levothyroxine 125mcg PO as prescribed. TSH 2.56 on 1/17/23. FT4 1.6 normal.  -Patient had not received 125mcg PO levothyroxine in 2 days for unknown reason. Switched to IV formulation which she received last night.   Proceed with levothyroxine 94mcg daily IV for now. Plan to resume home oral dose at or closer to discharge.  -Low sodium and hypothermia unlikely to be related to hypothyroidism (or adrenal insufficiency). Recommend pursuing non-endocrine workup. Nephrology consulted.  Discharge plan: levothyroxine 125 mcg po daily, take in AM on empty stomach    Controlled T2DM with hyperglycemia  Patient with a hx of DM2. HbA1c 6.9%. Has had DM for 23 years. Takes repaglinide 2mg TIDac and Tresiba 1-12 units depending on BG. Checks BG at bedtime. Sometimes BG in the 200s. No lows. Has a good appetite.  -Hold non-insulin DM agents while inpatient  -BG target 100-180 - above goal  Was NPO past midnight  Resume Admelog 2/2/2 units premeal TID once resume po diet  Resume Lantus 2 units qhs  -continue low dose Admelog correction scale pre-meal  -continue low dose Admelog correction scale at bedtime  -Fingerstick BG before meals and bedtime  -Goal -180  -Carbohydrate consistent diet  Discharge plan:  -Likely to discharge patient home on home regimen (see above). Final regimen pending clinical course.    HLD  -On atorvastatin 40mg daily. Continue this if no contraindications.    Endocrine follow up appointment is scheduled with nurse practitioner.  [address] [place] [address] [address], [phone]  March 2nd at 11AM    Citlalli Mejia MD  Division of Endocrinology  Pager: [phone]    If after 6PM or before 9AM, or on weekends/holidays, please call endocrine answering service for assistance ([phone]).  For nonurgent matters email [email or web address] for assistance.

## 2023-01-23 NOTE — PROGRESS NOTE ADULT - PROBLEM SELECTOR PLAN 1
Patient w/ hypothermia POA and tachypnea POA meeting sepsis criteria   - Worsening respiratory distress, placed on BIPAP from nasal cannula. Patient recently required intubation at OSH. Low threshold for MICU re-eval   - Pulm consulted and following - per pulm no role for thoracentesis    - CTA chest with bilateral pleural effusions and GGO, no PE. CT A/P commenting on increasing basilar airspace opacity compared to prior examination with dense consolidation in both lower lobes and right greater than left pleural effusions, not significantly changed.  - Given consolidations on CT, will treat empirically with Zosyn; MRSA negative, can stop vancomycin   - TTE showed grossly normal left ventricular systolic function. Mild diastolic dysfunction (Stage I). Right ventricular enlargement with decreased right ventricular systolic function. Estimated right ventricular systolic pressure equals 55  mm Hg, assuming right atrial pressure equals 10 mm Hg, consistent with moderate pulmonary hypertension.  - Diuresis w/ bumex per renal recs -  - Unclear the cause of her persistent hospitalizations for multiorgan failure, she does not appear to be in myxedema coma - is this rheumatologic vs. hematologic? f/u ANCAs, complement, STEVO, dsDNA + SPEP/UPEP, immunofixation   - Cards consulted and following, now planning for RHC 1/23  - hiv neg, hep panel neg

## 2023-01-23 NOTE — PROGRESS NOTE ADULT - ASSESSMENT
58 yo lady PMHx of HFpEF and pulmonary HTN who was admitted for acute on chronic decompensated HF exacerbation and ISAMAR on CKD    Plan  - C/w IV bumex at current dose w/ strict I/O and daily standing weight  - Will obtain RHC today

## 2023-01-23 NOTE — PROGRESS NOTE ADULT - ASSESSMENT
57F hypothyroidism (recent admissions for myxedema coma), HF? / moderate pHTN (TTE , unclear volume status at that time, normal LV/RV size and function), asthma, HTN, T2DM, hyperlipidemia, CKD, presented to the ED with shortness of breath found to have acute hypoxemic resp failure. Pulm consulted for further management.    Assessment: Respiratory failure likely multifactorial in setting of volume overload and possible PNA. Patient has moderate bilateral pleural effusions on imaging with dense consolidations bilaterally. She has lower extremity edema. Pro BNP 1600. She was hypothermic in ED. She had increased WOB on  and was started on bilevel. She has received diuretics and she appears improved. Started on broad spectrum abx. Unclear clinical picture, may have serositis given pleural and pericardial effusions. No known autoimmune or rheum disease. No findings on exam such as rash or abnormal hand findings.     Ultrasound  shows smaller bilateral effusions. Patient reports symptomatic improvement.  : Patient still with small effusions however has lower extremity edema. Appears overloaded. BNP 1600 earlier this admission. Responding to diuretics  : RHC demonstrating RA 17 mmHg. PA 58/27/41 mmHg. PCW 22 mmHg.    Recommendations:  - C/w abx  - Duonebs q6 standing   - Check sputum culture if able  - F/u RHC  - c/w diuresis as per primary team/cardiology    Pulm will follow    This patient will need to close hospital follow up after discharge with the pulmonary team:    Please email: [email or web address] to setup an a close hospital follow up appointment for the patient  prior to discharge with any available pulmonologist. The appointment should be within 1 week of discharge from the hospital. Include the patient's name, , MRN and contact information in the email.      [facility]  [address] [place] [address]  [phone]    Please discuss the appointment details with the patient and include the appointment details in the patients discharge summary.

## 2023-01-23 NOTE — PROGRESS NOTE ADULT - SUBJECTIVE AND OBJECTIVE BOX
Patient seen and examined at bedside.    Overnight Events:     No AEON     Continues to report SOB but improved, denies any chest pain or palpitations     Review Of Systems: As above            Current Meds:  acetaminophen     Tablet .. 650 milliGRAM(s) Oral every 6 hours PRN  albuterol/ipratropium for Nebulization 3 milliLiter(s) Nebulizer every 6 hours  aluminum hydroxide/magnesium hydroxide/simethicone Suspension 30 milliLiter(s) Oral every 4 hours PRN  buMETAnide Injectable 2 milliGRAM(s) IV Push every 8 hours  dextrose 5%. 1000 milliLiter(s) IV Continuous <Continuous>  dextrose 5%. 1000 milliLiter(s) IV Continuous <Continuous>  dextrose 50% Injectable 25 Gram(s) IV Push once  dextrose 50% Injectable 12.5 Gram(s) IV Push once  dextrose 50% Injectable 25 Gram(s) IV Push once  dextrose Oral Gel 15 Gram(s) Oral once PRN  glucagon  Injectable 1 milliGRAM(s) IntraMuscular once  guaiFENesin Oral Liquid (Sugar-Free) 200 milliGRAM(s) Oral every 6 hours PRN  heparin   Injectable 5000 Unit(s) SubCutaneous every 8 hours  influenza   Vaccine 0.5 milliLiter(s) IntraMuscular once  insulin lispro (ADMELOG) corrective regimen sliding scale   SubCutaneous three times a day before meals  insulin lispro (ADMELOG) corrective regimen sliding scale   SubCutaneous at bedtime  levothyroxine Injectable 94 MICROGram(s) IV Push at bedtime  melatonin 3 milliGRAM(s) Oral at bedtime PRN  mupirocin 2% Ointment 1 Application(s) Topical three times a day  ondansetron Injectable 4 milliGRAM(s) IV Push every 8 hours PRN  pantoprazole  Injectable 40 milliGRAM(s) IV Push daily  piperacillin/tazobactam IVPB.. 3.375 Gram(s) IV Intermittent every 12 hours  sodium chloride 3%. 500 milliLiter(s) IV Continuous <Continuous>  sodium zirconium cyclosilicate 10 Gram(s) Oral daily      Vitals:  T(F): 97.7 (01-23), Max: 98.6 (01-22)  HR: 78 (01-23) (72 - 88)  BP: 162/77 (01-23) (145/69 - 177/78)  RR: 17 (01-23)  SpO2: 100% (01-23)  I&O's Summary    22 Jan 2023 07:01  -  23 Jan 2023 07:00  --------------------------------------------------------  IN: 890 mL / OUT: 1375 mL / NET: -485 mL        Physical Exam:  Appearance: No acute distress, on BiPAP   Cardiovascular: RRR, S1, S2, no murmurs, rubs, or gallops; no edema; no JVD  Respiratory: Decreased breath sounds   Musculoskeletal: No clubbing; no joint deformity   Neurologic: Non-focal  Psychiatry: AAOx3, mood & affect appropriate  Skin: No rashes, ecchymoses, or cyanosis                          7.8    9.63  )-----------( 79       ( 22 Jan 2023 03:45 )             22.1     01-22    122<L>  |  91<L>  |  44<H>  ----------------------------<  139<H>  5.5<H>   |  16<L>  |  3.18<H>    Ca    8.9      22 Jan 2023 03:45  Phos  5.0     01-22  Mg     1.90     01-22        CARDIAC MARKERS ( 17 Jan 2023 00:50 )  27 ng/L / x     / x     / x     / x     / x      CARDIAC MARKERS ( 16 Jan 2023 20:15 )  26 ng/L / x     / x     / x     / x     / x          Serum Pro-Brain Natriuretic Peptide: 1654 pg/mL (01-16 @ 20:15)          New ECG(s): Personally reviewed    Echo:    Stress Testing:     Cath:    Imaging:    Interpretation of Telemetry:

## 2023-01-24 LAB
ANION GAP SERPL CALC-SCNC: 14 MMOL/L — SIGNIFICANT CHANGE UP (ref 7–14)
BUN SERPL-MCNC: 43 MG/DL — HIGH (ref 7–23)
CALCIUM SERPL-MCNC: 9.1 MG/DL — SIGNIFICANT CHANGE UP (ref 8.4–10.5)
CHLORIDE SERPL-SCNC: 92 MMOL/L — LOW (ref 98–107)
CO2 SERPL-SCNC: 26 MMOL/L — SIGNIFICANT CHANGE UP (ref 22–31)
CREAT SERPL-MCNC: 2.86 MG/DL — HIGH (ref 0.5–1.3)
EGFR: 19 ML/MIN/1.73M2 — LOW
GLUCOSE BLDC GLUCOMTR-MCNC: 115 MG/DL — HIGH (ref 70–99)
GLUCOSE BLDC GLUCOMTR-MCNC: 184 MG/DL — HIGH (ref 70–99)
GLUCOSE BLDC GLUCOMTR-MCNC: 201 MG/DL — HIGH (ref 70–99)
GLUCOSE BLDC GLUCOMTR-MCNC: 202 MG/DL — HIGH (ref 70–99)
GLUCOSE SERPL-MCNC: 90 MG/DL — SIGNIFICANT CHANGE UP (ref 70–99)
HCT VFR BLD CALC: 21.4 % — LOW (ref 34.5–45)
HGB BLD-MCNC: 7.1 G/DL — LOW (ref 11.5–15.5)
INTERPRETATION 24H UR IFE-IMP: SIGNIFICANT CHANGE UP
INTERPRETATION 24H UR IFE-IMP: SIGNIFICANT CHANGE UP
INTERPRETATION SERPL IFE-IMP: SIGNIFICANT CHANGE UP
MAGNESIUM SERPL-MCNC: 1.6 MG/DL — SIGNIFICANT CHANGE UP (ref 1.6–2.6)
MCHC RBC-ENTMCNC: 26.4 PG — LOW (ref 27–34)
MCHC RBC-ENTMCNC: 33.2 GM/DL — SIGNIFICANT CHANGE UP (ref 32–36)
MCV RBC AUTO: 79.6 FL — LOW (ref 80–100)
NRBC # BLD: 0 /100 WBCS — SIGNIFICANT CHANGE UP (ref 0–0)
NRBC # FLD: 0.09 K/UL — HIGH (ref 0–0)
PHOSPHATE SERPL-MCNC: 5.2 MG/DL — HIGH (ref 2.5–4.5)
PLATELET # BLD AUTO: 55 K/UL — LOW (ref 150–400)
POTASSIUM SERPL-MCNC: 3.9 MMOL/L — SIGNIFICANT CHANGE UP (ref 3.5–5.3)
POTASSIUM SERPL-SCNC: 3.9 MMOL/L — SIGNIFICANT CHANGE UP (ref 3.5–5.3)
RBC # BLD: 2.69 M/UL — LOW (ref 3.8–5.2)
RBC # FLD: 15.3 % — HIGH (ref 10.3–14.5)
SODIUM SERPL-SCNC: 132 MMOL/L — LOW (ref 135–145)
WBC # BLD: 7.59 K/UL — SIGNIFICANT CHANGE UP (ref 3.8–10.5)
WBC # FLD AUTO: 7.59 K/UL — SIGNIFICANT CHANGE UP (ref 3.8–10.5)

## 2023-01-24 PROCEDURE — 99232 SBSQ HOSP IP/OBS MODERATE 35: CPT

## 2023-01-24 PROCEDURE — 99233 SBSQ HOSP IP/OBS HIGH 50: CPT | Mod: GC

## 2023-01-24 RX ORDER — SODIUM CHLORIDE 0.65 %
1 AEROSOL, SPRAY (ML) NASAL THREE TIMES A DAY
Refills: 0 | Status: DISCONTINUED | OUTPATIENT
Start: 2023-01-24 | End: 2023-01-31

## 2023-01-24 RX ORDER — OXYMETAZOLINE HYDROCHLORIDE 0.5 MG/ML
1 SPRAY NASAL
Refills: 0 | Status: DISCONTINUED | OUTPATIENT
Start: 2023-01-24 | End: 2023-01-24

## 2023-01-24 RX ADMIN — Medication 3 MILLILITER(S): at 15:59

## 2023-01-24 RX ADMIN — PIPERACILLIN AND TAZOBACTAM 25 GRAM(S): 4; .5 INJECTION, POWDER, LYOPHILIZED, FOR SOLUTION INTRAVENOUS at 05:41

## 2023-01-24 RX ADMIN — MUPIROCIN 1 APPLICATION(S): 20 OINTMENT TOPICAL at 05:47

## 2023-01-24 RX ADMIN — Medication 3 MILLILITER(S): at 22:12

## 2023-01-24 RX ADMIN — PANTOPRAZOLE SODIUM 40 MILLIGRAM(S): 20 TABLET, DELAYED RELEASE ORAL at 10:57

## 2023-01-24 RX ADMIN — MUPIROCIN 1 APPLICATION(S): 20 OINTMENT TOPICAL at 11:03

## 2023-01-24 RX ADMIN — MUPIROCIN 1 APPLICATION(S): 20 OINTMENT TOPICAL at 21:09

## 2023-01-24 RX ADMIN — Medication 94 MICROGRAM(S): at 21:08

## 2023-01-24 RX ADMIN — Medication 2 UNIT(S): at 08:23

## 2023-01-24 RX ADMIN — PIPERACILLIN AND TAZOBACTAM 25 GRAM(S): 4; .5 INJECTION, POWDER, LYOPHILIZED, FOR SOLUTION INTRAVENOUS at 18:30

## 2023-01-24 RX ADMIN — Medication 1 SPRAY(S): at 17:17

## 2023-01-24 RX ADMIN — HEPARIN SODIUM 5000 UNIT(S): 5000 INJECTION INTRAVENOUS; SUBCUTANEOUS at 18:29

## 2023-01-24 RX ADMIN — HEPARIN SODIUM 5000 UNIT(S): 5000 INJECTION INTRAVENOUS; SUBCUTANEOUS at 10:47

## 2023-01-24 RX ADMIN — INSULIN GLARGINE 2 UNIT(S): 100 INJECTION, SOLUTION SUBCUTANEOUS at 21:08

## 2023-01-24 RX ADMIN — Medication 3 MILLILITER(S): at 03:45

## 2023-01-24 RX ADMIN — Medication 2 UNIT(S): at 12:07

## 2023-01-24 RX ADMIN — HEPARIN SODIUM 5000 UNIT(S): 5000 INJECTION INTRAVENOUS; SUBCUTANEOUS at 02:23

## 2023-01-24 RX ADMIN — Medication 2: at 17:16

## 2023-01-24 RX ADMIN — Medication 1: at 12:08

## 2023-01-24 RX ADMIN — BUMETANIDE 2 MILLIGRAM(S): 0.25 INJECTION INTRAMUSCULAR; INTRAVENOUS at 10:47

## 2023-01-24 RX ADMIN — BUMETANIDE 2 MILLIGRAM(S): 0.25 INJECTION INTRAMUSCULAR; INTRAVENOUS at 02:22

## 2023-01-24 RX ADMIN — BUMETANIDE 2 MILLIGRAM(S): 0.25 INJECTION INTRAMUSCULAR; INTRAVENOUS at 18:29

## 2023-01-24 RX ADMIN — Medication 1 SPRAY(S): at 21:11

## 2023-01-24 RX ADMIN — Medication 2 UNIT(S): at 17:16

## 2023-01-24 NOTE — PROGRESS NOTE ADULT - PROBLEM SELECTOR PLAN 1
Pt. with ISAMAR on CKD in the setting of infection and recent IV contrast use. CT scan with IV contrast was done on 1/17. Scr was elevated at 1.52 on 1/18. Scr is elevated/stable at 3.10 today (1/23/23). UOP ~1.4 L in last 24 hrs. Spot urine TP/CR ratio was significantly elevated at 5.9. C3 and C4 were not low. Testing for HIV was negative. STEVO, HBsAg and Hep C Ab were nonreactive/negative, negative ANCA levels and serum VEE. Pt. with LE edema/fluid overload, on IV Bumex therapy. Recommend to continue with IV Bumex 2 mg IV TID. Cardiology note from 1/24/23 reviewed. Monitor labs and urine output. Avoid any potential nephrotoxins. Dose medications as per eGFR.

## 2023-01-24 NOTE — PROGRESS NOTE ADULT - PROBLEM SELECTOR PLAN 4
Na improving. 132 today.   - Suspect multifactorial in setting of volume overload, SIADH  - receiving IV bumex, escalated per nephrology recommendations  - received an infusion of 3% saline 1/20  - Is/Os  - trend BMP regularly; goal correction of Na 4-6 mEq/24h, do not exceed >8 mEq correction in 24h

## 2023-01-24 NOTE — PROGRESS NOTE ADULT - PROBLEM SELECTOR PLAN 3
Pt. with hyperkalemia in the setting of ISAMAR. Serum potassium WNL at 3.9 today. Low potassium diet. Monitor serum potassium.      If you have any questions, please feel free to contact me  Chuck Harvey  Nephrology Fellow  [phone]/ [employer] Teams(Preferred)  (After 5pm or on weekends please page the on-call fellow).

## 2023-01-24 NOTE — PROGRESS NOTE ADULT - ASSESSMENT
56 yo lady PMHx of HFpEF and pulmonary HTN who was admitted for acute on chronic decompensated HF exacerbation and ISAMAR on CKD    #AHRF   #Dyspnea   #Pulm HTN   Patient with moderate pulm HTN on echo and now decreased RV function. Has hx of pulm HTN moderate-severe in past of unclear etiology. CT chest showed bilateral pleural effusion and pulm edema with elevated BNP but clinically does not seem to be volume overloaded.   -Continue bumex 2mg IV q8   -RHC results noted as above with elevated wedge pressure and noted moderate-severe pulm HTN and elevated CVP   -Strict I/O, aim for net negative 1L   -Daily weights

## 2023-01-24 NOTE — PROGRESS NOTE ADULT - PROBLEM SELECTOR PLAN 7
Patient with two recent hospitalizations, one at [facility] and one at OSH for myxedema coma   - Endo following, currently no evidence of myxedema   - TSH wnl   - C/w iv synthroid

## 2023-01-24 NOTE — PROGRESS NOTE ADULT - ASSESSMENT
57F with hypothyroidism, HTN, T2DM, HLD, CKD, PAH, asthma on albuterol presented to the ED w/respiratory distress. Endocrine consulted for hypothyroidism and DM2.    Hypothyroidism, not in myxedema  Taking Levothyroxine 125mcg PO as prescribed. TSH 2.56 on 1/17/23. FT4 1.6 normal.  -Patient had not received 125mcg PO levothyroxine in 2 days for unknown reason. Switched to IV formulation which she received last night.   Proceed with levothyroxine 94mcg daily IV for now. Plan to resume home oral dose at or closer to discharge.  -Low sodium and hypothermia unlikely to be related to hypothyroidism (or adrenal insufficiency). Recommend pursuing non-endocrine workup. Nephrology consulted.  Discharge plan: levothyroxine 125 mcg po daily, take in AM on empty stomach    Controlled T2DM with hyperglycemia  Patient with a hx of DM2. HbA1c 6.9%. Has had DM for 23 years. Takes repaglinide 2mg TIDac and Tresiba 1-12 units depending on BG. Checks BG at bedtime. Sometimes BG in the 200s. No lows. Has a good appetite.  -Hold non-insulin DM agents while inpatient  -BG target 100-180 - glucose improved today  Continue Admelog 2/2/2 units premeal TID   Continue Lantus 2 units qhs  -continue low dose Admelog correction scale pre-meal  -continue low dose Admelog correction scale at bedtime  -Fingerstick BG before meals and bedtime  -Goal -180  -Carbohydrate consistent diet  Discharge plan:  -Likely to discharge patient home on home regimen (see above). Final regimen pending clinical course.    HLD  -On atorvastatin 40mg daily. Continue this if no contraindications.    Endocrine follow up appointment is scheduled with nurse practitioner.  [address] [facility] [address], [phone]  March 2nd at 11AM    Ciltalli Mejia MD  Division of Endocrinology  Pager: [phone]    If after 6PM or before 9AM, or on weekends/holidays, please call endocrine answering service for assistance ([phone]).  For nonurgent matters email [email or web address] for assistance.

## 2023-01-24 NOTE — PROGRESS NOTE ADULT - SUBJECTIVE AND OBJECTIVE BOX
Patient seen and examined at bedside.    Overnight Events:     Had noted epistaxis overnight while on BiPAP, switched to NC this morning after bleeding stopped     Denies any chest pain or palpitations, SOB slightly improved today     Review Of Systems: As above      Current Meds:  acetaminophen     Tablet .. 650 milliGRAM(s) Oral every 6 hours PRN  albuterol/ipratropium for Nebulization 3 milliLiter(s) Nebulizer every 6 hours  aluminum hydroxide/magnesium hydroxide/simethicone Suspension 30 milliLiter(s) Oral every 4 hours PRN  buMETAnide Injectable 2 milliGRAM(s) IV Push every 8 hours  dextrose 5%. 1000 milliLiter(s) IV Continuous <Continuous>  dextrose 5%. 1000 milliLiter(s) IV Continuous <Continuous>  dextrose 50% Injectable 25 Gram(s) IV Push once  dextrose 50% Injectable 12.5 Gram(s) IV Push once  dextrose 50% Injectable 25 Gram(s) IV Push once  dextrose Oral Gel 15 Gram(s) Oral once PRN  glucagon  Injectable 1 milliGRAM(s) IntraMuscular once  guaiFENesin Oral Liquid (Sugar-Free) 200 milliGRAM(s) Oral every 6 hours PRN  heparin   Injectable 5000 Unit(s) SubCutaneous every 8 hours  influenza   Vaccine 0.5 milliLiter(s) IntraMuscular once  insulin glargine Injectable (LANTUS) 2 Unit(s) SubCutaneous at bedtime  insulin lispro (ADMELOG) corrective regimen sliding scale   SubCutaneous at bedtime  insulin lispro (ADMELOG) corrective regimen sliding scale   SubCutaneous three times a day before meals  insulin lispro Injectable (ADMELOG) 2 Unit(s) SubCutaneous three times a day before meals  levothyroxine Injectable 94 MICROGram(s) IV Push at bedtime  melatonin 3 milliGRAM(s) Oral at bedtime PRN  mupirocin 2% Ointment 1 Application(s) Topical three times a day  ondansetron Injectable 4 milliGRAM(s) IV Push every 8 hours PRN  pantoprazole  Injectable 40 milliGRAM(s) IV Push daily  piperacillin/tazobactam IVPB.. 3.375 Gram(s) IV Intermittent every 12 hours  sodium chloride 3%. 500 milliLiter(s) IV Continuous <Continuous>  sodium zirconium cyclosilicate 10 Gram(s) Oral daily      Vitals:  T(F): 97.9 (01-24), Max: 97.9 (01-24)  HR: 78 (01-24) (65 - 87)  BP: 133/80 (01-24) (133/80 - 160/71)  RR: 18 (01-24)  SpO2: 100% (01-24)  I&O's Summary    23 Jan 2023 07:01  -  24 Jan 2023 07:00  --------------------------------------------------------  IN: 300 mL / OUT: 1700 mL / NET: -1400 mL        Physical Exam:  Appearance: No acute distress; on NC   Cardiovascular: RRR, S1, S2, no murmurs, rubs, or gallops; LE edema noted   Respiratory: Coarse breath sounds   Musculoskeletal: No clubbing; no joint deformity   Neurologic: Non-focal  Psychiatry: AAOx3, mood & affect appropriate  Skin: No rashes, ecchymoses, or cyanosis                          7.3    10.00 )-----------( 69       ( 23 Jan 2023 05:30 )             21.4     01-24    132<L>  |  92<L>  |  43<H>  ----------------------------<  90  3.9   |  26  |  2.86<H>    Ca    9.1      24 Jan 2023 06:02  Phos  5.2     01-24  Mg     1.60     01-24                    New ECG(s): Personally reviewed    Echo:    Stress Testing:     Cath:  RA 17 mmHg. PA 58/27/41 mmHg. PCW 22 mmHg.   CO 8.79 L/min (Hgb 7.7). CI 5.33 L/min/m2.  dsc. PVR 2.16 Kaur.  Imaging:    Interpretation of Telemetry:

## 2023-01-24 NOTE — PROGRESS NOTE ADULT - SUBJECTIVE AND OBJECTIVE BOX
[facility] DIVISION OF KIDNEY DISEASES AND HYPERTENSION -- FOLLOW UP NOTE  --------------------------------------------------------------------------------  HPI: 57-year-old female with history of DM, HTN, CHF, CKD, hypothyroidism, PAH, asthma, and HLD admitted at [facility] on 1/18/23 for SOB/pneumonia. Pt. being seen for ISAMAR on CKD and hyponatremia.    24 hour events/subjective: Pt. seen and examined this morning. Reports feeling well, endorses no complaints. Currently on NC.  No CP, SOB, abdominal pain, HA or dizziness. Underwent RHC yesterday, tolerated the procedure well.     PAST HISTORY  --------------------------------------------------------------------------------  No significant changes to PMH, PSH, FHx, SHx, unless otherwise noted    ALLERGIES & MEDICATIONS  --------------------------------------------------------------------------------  Allergies    No Known Allergies    Intolerances    Standing Inpatient Medications  albuterol/ipratropium for Nebulization 3 milliLiter(s) Nebulizer every 6 hours  buMETAnide Injectable 2 milliGRAM(s) IV Push every 8 hours  dextrose 5%. 1000 milliLiter(s) IV Continuous <Continuous>  dextrose 5%. 1000 milliLiter(s) IV Continuous <Continuous>  dextrose 50% Injectable 25 Gram(s) IV Push once  dextrose 50% Injectable 12.5 Gram(s) IV Push once  dextrose 50% Injectable 25 Gram(s) IV Push once  glucagon  Injectable 1 milliGRAM(s) IntraMuscular once  heparin   Injectable 5000 Unit(s) SubCutaneous every 8 hours  influenza   Vaccine 0.5 milliLiter(s) IntraMuscular once  insulin glargine Injectable (LANTUS) 2 Unit(s) SubCutaneous at bedtime  insulin lispro (ADMELOG) corrective regimen sliding scale   SubCutaneous three times a day before meals  insulin lispro (ADMELOG) corrective regimen sliding scale   SubCutaneous at bedtime  insulin lispro Injectable (ADMELOG) 2 Unit(s) SubCutaneous three times a day before meals  levothyroxine Injectable 94 MICROGram(s) IV Push at bedtime  mupirocin 2% Ointment 1 Application(s) Topical three times a day  pantoprazole  Injectable 40 milliGRAM(s) IV Push daily  piperacillin/tazobactam IVPB.. 3.375 Gram(s) IV Intermittent every 12 hours  sodium chloride 3%. 500 milliLiter(s) IV Continuous <Continuous>    PRN Inpatient Medications  acetaminophen     Tablet .. 650 milliGRAM(s) Oral every 6 hours PRN  aluminum hydroxide/magnesium hydroxide/simethicone Suspension 30 milliLiter(s) Oral every 4 hours PRN  dextrose Oral Gel 15 Gram(s) Oral once PRN  guaiFENesin Oral Liquid (Sugar-Free) 200 milliGRAM(s) Oral every 6 hours PRN  melatonin 3 milliGRAM(s) Oral at bedtime PRN  ondansetron Injectable 4 milliGRAM(s) IV Push every 8 hours PRN  sodium chloride 0.65% Nasal 1 Spray(s) Both Nostrils three times a day PRN    REVIEW OF SYSTEMS  --------------------------------------------------------------------------------  Gen: No fevers/chills   Respiratory: No dyspnea   CV: No chest pain   GI: No abdominal pain   MSK: LE edema   Neuro: No dizziness  All other systems were reviewed and are negative, except as noted.    VITALS/PHYSICAL EXAM  --------------------------------------------------------------------------------  T(C): 36.6 (01-24-23 @ 10:45), Max: 36.6 (01-24-23 @ 05:00)  HR: 75 (01-24-23 @ 10:45) (65 - 87)  BP: 160/54 (01-24-23 @ 10:45) (133/80 - 160/71)  RR: 18 (01-24-23 @ 10:45) (18 - 18)  SpO2: 98% (01-24-23 @ 10:45) (98% - 100%)  Wt(kg): --    01-23-23 @ 07:01  -  01-24-23 @ 07:00  --------------------------------------------------------  IN: 300 mL / OUT: 1700 mL / NET: -1400 mL    Physical Exam:  	Gen: NAD  	HEENT: MMM, on NC  	Pulm: CTA B/L  	CV: S1S2  	Abd: Soft, +BS   	Ext: No LE edema B/L  	Neuro: Awake  	Skin: Warm and dry  	Vascular access: IV peripheral canula    LABS/STUDIES  --------------------------------------------------------------------------------              7.1    7.59  >-----------<  55       [01-24-23 @ 06:02]              21.4     132  |  92  |  43  ----------------------------<  90      [01-24-23 @ 06:02]  3.9   |  26  |  2.86        Ca     9.1     [01-24-23 @ 06:02]      Mg     1.60     [01-24-23 @ 06:02]      Phos  5.2     [01-24-23 @ 06:02]    Creatinine Trend:  SCr 2.86 [01-24 @ 06:02]  SCr 3.10 [01-23 @ 05:30]  SCr 3.18 [01-22 @ 03:45]  SCr 3.19 [01-21 @ 16:52]  SCr 2.90 [01-21 @ 04:50]    Urinalysis - [01-18-23 @ 05:58]      Color Colorless / Appearance Clear / SG 1.011 / pH 6.0      Gluc Trace / Ketone Negative  / Bili Negative / Urobili <2 mg/dL       Blood Trace / Protein 30 mg/dL / Leuk Est Negative / Nitrite Negative      RBC 4 / WBC 3 / Hyaline 1 / Gran  / Sq Epi  / Non Sq Epi 3 / Bacteria Negative    Urine Creatinine 23      [01-19-23 @ 10:28]  Urine Protein 116      [01-19-23 @ 10:28]  Urine Sodium 47      [01-18-23 @ 05:58]  Urine Urea Nitrogen 114.0      [01-18-23 @ 05:58]  Urine Potassium 14.2      [01-18-23 @ 05:58]  Urine Osmolality 194      [01-18-23 @ 05:58]    Iron 42, TIBC 306, %sat 14      [01-17-23 @ 07:10]  Ferritin 729      [01-17-23 @ 07:10]  TSH 1.35      [01-21-23 @ 04:50]  Lipid: chol 107, TG 28, HDL 61, LDL --      [11-25-22 @ 21:00]    HBsAg Nonreact      [01-20-23 @ 19:51]  HCV 0.09, Nonreact      [01-20-23 @ 19:51]  HIV Nonreact      [01-20-23 @ 19:51]    STEVO: titer Negative, pattern --      [01-19-23 @ 05:02]  dsDNA <12      [01-19-23 @ 05:02]  C3 Complement 128      [01-19-23 @ 05:02]  C4 Complement 46      [01-19-23 @ 05:02]  Rheumatoid Factor 10      [01-19-23 @ 05:02]  ANCA: cANCA Negative, pANCA Negative, atypical ANCA Indeterminate Method interference due to STEVO Fluorescence      [01-19-23 @ 05:02]  anti-GBM <0.2      [01-19-23 @ 05:02]  Free Light Chains: kappa 8.92, lambda 3.79, ratio = 2.35      [01-19 @ 05:02]  Immunofixation Serum:   No Monoclonal Band Identified. HUMPHREY Mccrary MD    Reference Range: None Detected      [01-19-23 @ 05:02]  SPEP Interpretation: Increased Beta fraction, possibly transferrin increase. HUMPHREY Mccrary MD      [01-19-23 @ 05:02]  Immunofixation Urine:   Reference Range: None Detected      [01-19-23 @ 08:10]

## 2023-01-24 NOTE — PROGRESS NOTE ADULT - ASSESSMENT
57F hypothyroidism (recent admissions for myxedema coma), HF? / moderate pHTN (TTE , unclear volume status at that time, normal LV/RV size and function), asthma, HTN, T2DM, hyperlipidemia, CKD, presented to the ED with shortness of breath found to have acute hypoxemic resp failure. Pulm consulted for further management.    Assessment: Respiratory failure likely multifactorial in setting of volume overload and possible PNA. Patient has moderate bilateral pleural effusions on imaging with dense consolidations bilaterally. She has lower extremity edema. Pro BNP 1600. She was hypothermic in ED. She had increased WOB on  and was started on bilevel. She has received diuretics and she appears improved. Started on broad spectrum abx. Unclear clinical picture, may have serositis given pleural and pericardial effusions. No known autoimmune or rheum disease. No findings on exam such as rash or abnormal hand findings.     Ultrasound  shows smaller bilateral effusions. Patient reports symptomatic improvement.  : Patient still with small effusions however has lower extremity edema. Appears overloaded. BNP 1600 earlier this admission. Responding to diuretics  : RHC demonstrating RA 17 mmHg. PA 58/27/41 mmHg. PCW 22 mmHg.    Recommendations:  - high mean PA pressure with elevated wedge, DPG of 5 and PVR ~2 suggests post capillary/grp II pHTN, c/w negative fluid balance  - C/w abx  - Duonebs q6 standing   - Check sputum culture if able  - c/w diuresis as per primary team/cardiology  - will reassess pleff with pocus this week  - please repeat ABG    Pulm will follow    This patient will need to close hospital follow up after discharge with the pulmonary team:    Please email: [email or web address] to setup an a close hospital follow up appointment for the patient  prior to discharge with any available pulmonologist. The appointment should be within 1 week of discharge from the hospital. Include the patient's name, , MRN and contact information in the email.      [facility]  [address] [place] [address]  [phone]    Please discuss the appointment details with the patient and include the appointment details in the patients discharge summary.

## 2023-01-24 NOTE — CHART NOTE - NSCHARTNOTEFT_GEN_A_CORE
Spoke with Nephrology - ok to d/c lokelma for now and continue to monitor labs closely    Marixa Cohn PA-C  Medicine Team  In House [phone]

## 2023-01-24 NOTE — PROGRESS NOTE ADULT - PROBLEM SELECTOR PLAN 1
Patient w/ hypothermia POA and tachypnea POA meeting sepsis criteria   - Worsening respiratory distress, placed on BIPAP from nasal cannula. Patient recently required intubation at OSH. Low threshold for MICU re-eval   - Pulm consulted and following - per pulm no role for thoracentesis    - CTA chest with bilateral pleural effusions and GGO, no PE. CT A/P commenting on increasing basilar airspace opacity compared to prior examination with dense consolidation in both lower lobes and right greater than left pleural effusions, not significantly changed.  - Given consolidations on CT, will treat empirically with Zosyn; MRSA negative, can stop vancomycin   - TTE showed grossly normal left ventricular systolic function. Mild diastolic dysfunction (Stage I). Right ventricular enlargement with decreased right ventricular systolic function. Estimated right ventricular systolic pressure equals 55  mm Hg, assuming right atrial pressure equals 10 mm Hg, consistent with moderate pulmonary hypertension.  - Diuresis w/ bumex - continue with 2IV q8 for now.  - Unclear the cause of her persistent hospitalizations for multiorgan failure, she does not appear to be in myxedema coma - is this rheumatologic vs. hematologic? f/u ANCAs, complement, STEVO, dsDNA + SPEP/UPEP, immunofixation   - Cards consulted and following, RHC 1/23 - showing elevated wedge pressure and mod-severe pulm HTN and elevated CVP. Pending pulm and cards recs.   - hiv neg, hep panel neg

## 2023-01-24 NOTE — PROGRESS NOTE ADULT - SUBJECTIVE AND OBJECTIVE BOX
CHIEF COMPLAINT:Patient is a 57y old  Female who presents with a chief complaint of Respiratory distress (24 Jan 2023 14:44)      Interval Events:    REVIEW OF SYSTEMS:  [x] All other systems negative except per HPI   [ ] Unable to assess ROS because ________    OBJECTIVE:  ICU Vital Signs Last 24 Hrs  T(C): 36.6 (24 Jan 2023 10:45), Max: 36.6 (24 Jan 2023 05:00)  T(F): 97.8 (24 Jan 2023 10:45), Max: 97.9 (24 Jan 2023 05:00)  HR: 78 (24 Jan 2023 18:35) (66 - 87)  BP: 159/75 (24 Jan 2023 18:35) (133/80 - 162/72)  BP(mean): --  ABP: --  ABP(mean): --  RR: 18 (24 Jan 2023 10:45) (18 - 18)  SpO2: 98% (24 Jan 2023 10:45) (98% - 100%)    O2 Parameters below as of 24 Jan 2023 15:59  Patient On (Oxygen Delivery Method): nasal cannula              01-23 @ 07:01 - 01-24 @ 07:00  --------------------------------------------------------  IN: 300 mL / OUT: 1700 mL / NET: -1400 mL    01-24 @ 07:01 - 01-24 @ 20:07  --------------------------------------------------------  IN: 720 mL / OUT: 1200 mL / NET: -480 mL        PHYSICAL EXAM:  GENERAL: NAD, well-groomed, well-developed  HEAD:  Atraumatic, Normocephalic  EYES: EOMI, PERRLA, conjunctiva and sclera clear  ENMT: No tonsillar erythema, exudates, or enlargement; Moist mucous membranes, Good dentition, No lesions  NECK: Supple, No JVD, Normal thyroid  CHEST/LUNG: Clear to auscultation bilaterally; No rales, rhonchi, wheezing, or rubs  HEART: Regular rate and rhythm; No murmurs, rubs, or gallops  ABDOMEN: Soft, Nontender, Nondistended; Bowel sounds present  VASCULAR:  2+ Peripheral Pulses, No clubbing, cyanosis, or edema  LYMPH: No lymphadenopathy noted  SKIN: No rashes or lesions  NERVOUS SYSTEM:  Alert & Oriented X3, Good concentration; Motor Strength 5/5 B/L upper and lower extremities; DTRs 2+ intact and symmetric    HOSPITAL MEDICATIONS:  MEDICATIONS  (STANDING):  albuterol/ipratropium for Nebulization 3 milliLiter(s) Nebulizer every 6 hours  buMETAnide Injectable 2 milliGRAM(s) IV Push every 8 hours  dextrose 5%. 1000 milliLiter(s) (50 mL/Hr) IV Continuous <Continuous>  dextrose 5%. 1000 milliLiter(s) (100 mL/Hr) IV Continuous <Continuous>  dextrose 50% Injectable 25 Gram(s) IV Push once  dextrose 50% Injectable 12.5 Gram(s) IV Push once  dextrose 50% Injectable 25 Gram(s) IV Push once  glucagon  Injectable 1 milliGRAM(s) IntraMuscular once  heparin   Injectable 5000 Unit(s) SubCutaneous every 8 hours  influenza   Vaccine 0.5 milliLiter(s) IntraMuscular once  insulin glargine Injectable (LANTUS) 2 Unit(s) SubCutaneous at bedtime  insulin lispro (ADMELOG) corrective regimen sliding scale   SubCutaneous three times a day before meals  insulin lispro (ADMELOG) corrective regimen sliding scale   SubCutaneous at bedtime  insulin lispro Injectable (ADMELOG) 2 Unit(s) SubCutaneous three times a day before meals  levothyroxine Injectable 94 MICROGram(s) IV Push at bedtime  mupirocin 2% Ointment 1 Application(s) Topical three times a day  pantoprazole  Injectable 40 milliGRAM(s) IV Push daily  piperacillin/tazobactam IVPB.. 3.375 Gram(s) IV Intermittent every 12 hours  sodium chloride 3%. 500 milliLiter(s) (30 mL/Hr) IV Continuous <Continuous>    MEDICATIONS  (PRN):  acetaminophen     Tablet .. 650 milliGRAM(s) Oral every 6 hours PRN Temp greater or equal to 38C (100.4F), Mild Pain (1 - 3)  aluminum hydroxide/magnesium hydroxide/simethicone Suspension 30 milliLiter(s) Oral every 4 hours PRN Dyspepsia  dextrose Oral Gel 15 Gram(s) Oral once PRN Blood Glucose LESS THAN 70 milliGRAM(s)/deciliter  guaiFENesin Oral Liquid (Sugar-Free) 200 milliGRAM(s) Oral every 6 hours PRN Cough  melatonin 3 milliGRAM(s) Oral at bedtime PRN Insomnia  ondansetron Injectable 4 milliGRAM(s) IV Push every 8 hours PRN Nausea and/or Vomiting  sodium chloride 0.65% Nasal 1 Spray(s) Both Nostrils three times a day PRN nose bleed      LABS:    The Labs were reviewed by me   The Radiology was reviewed by me    EKG tracing reviewed by me    01-24    132<L>  |  92<L>  |  43<H>  ----------------------------<  90  3.9   |  26  |  2.86<H>  01-23    127<L>  |  89<L>  |  44<H>  ----------------------------<  182<H>  3.9   |  21<L>  |  3.10<H>  01-22    122<L>  |  91<L>  |  44<H>  ----------------------------<  139<H>  5.5<H>   |  16<L>  |  3.18<H>    Ca    9.1      24 Jan 2023 06:02  Ca    9.0      23 Jan 2023 05:30  Ca    8.9      22 Jan 2023 03:45  Phos  5.2     01-24  Mg     1.60     01-24      Magnesium, Serum: 1.60 mg/dL (01-24-23 @ 06:02)  Magnesium, Serum: 1.70 mg/dL (01-23-23 @ 05:30)  Magnesium, Serum: 1.90 mg/dL (01-22-23 @ 03:45)    Phosphorus Level, Serum: 5.2 mg/dL (01-24-23 @ 06:02)  Phosphorus Level, Serum: 5.1 mg/dL (01-23-23 @ 05:30)  Phosphorus Level, Serum: 5.0 mg/dL (01-22-23 @ 03:45)                                              7.1    7.59  )-----------( 55       ( 24 Jan 2023 06:02 )             21.4                         7.3    10.00 )-----------( 69       ( 23 Jan 2023 05:30 )             21.4                         7.8    9.63  )-----------( 79       ( 22 Jan 2023 03:45 )             22.1     CAPILLARY BLOOD GLUCOSE  115 (24 Jan 2023 07:32)      POCT Blood Glucose.: 201 mg/dL (24 Jan 2023 16:36)  POCT Blood Glucose.: 184 mg/dL (24 Jan 2023 11:56)  POCT Blood Glucose.: 115 mg/dL (24 Jan 2023 07:32)  POCT Blood Glucose.: 111 mg/dL (23 Jan 2023 21:02)        MICROBIOLOGY:     RADIOLOGY:  [ ] Reviewed and interpreted by me    Point of Care Ultrasound Findings:    PFT:    EKG: CHIEF COMPLAINT:Patient is a 57y old  Female who presents with a chief complaint of Respiratory distress (24 Jan 2023 14:44)      Interval Events:  underwent RHC yesterday    REVIEW OF SYSTEMS:  [x] All other systems negative except per HPI   [ ] Unable to assess ROS because ________    OBJECTIVE:  ICU Vital Signs Last 24 Hrs  T(C): 36.6 (24 Jan 2023 10:45), Max: 36.6 (24 Jan 2023 05:00)  T(F): 97.8 (24 Jan 2023 10:45), Max: 97.9 (24 Jan 2023 05:00)  HR: 78 (24 Jan 2023 18:35) (66 - 87)  BP: 159/75 (24 Jan 2023 18:35) (133/80 - 162/72)  BP(mean): --  ABP: --  ABP(mean): --  RR: 18 (24 Jan 2023 10:45) (18 - 18)  SpO2: 98% (24 Jan 2023 10:45) (98% - 100%)    O2 Parameters below as of 24 Jan 2023 15:59  Patient On (Oxygen Delivery Method): nasal cannula              01-23 @ 07:01 - 01-24 @ 07:00  --------------------------------------------------------  IN: 300 mL / OUT: 1700 mL / NET: -1400 mL    01-24 @ 07:01 - 01-24 @ 20:07  --------------------------------------------------------  IN: 720 mL / OUT: 1200 mL / NET: -480 mL        PHYSICAL EXAM:  GEN: Age appropriate, resting comfortably in bed, no acute distress, non toxic appearing, speaking in complete sentences.   HEENT: Conjunctiva and sclera normal  PULM: Lungs decreased breath sounds at the bases  CV: RRR, S1S2, no MRG  MSK: no stiffness or joint effusions  Abdominal: Soft, nontender to palpation, non-distended, +BS  Extremities: No edema or cyanosis  NEURO: AAOx3  Psych: normal affect, normal behavior  Skin: No rashes, lesions    HOSPITAL MEDICATIONS:  MEDICATIONS  (STANDING):  albuterol/ipratropium for Nebulization 3 milliLiter(s) Nebulizer every 6 hours  buMETAnide Injectable 2 milliGRAM(s) IV Push every 8 hours  dextrose 5%. 1000 milliLiter(s) (50 mL/Hr) IV Continuous <Continuous>  dextrose 5%. 1000 milliLiter(s) (100 mL/Hr) IV Continuous <Continuous>  dextrose 50% Injectable 25 Gram(s) IV Push once  dextrose 50% Injectable 12.5 Gram(s) IV Push once  dextrose 50% Injectable 25 Gram(s) IV Push once  glucagon  Injectable 1 milliGRAM(s) IntraMuscular once  heparin   Injectable 5000 Unit(s) SubCutaneous every 8 hours  influenza   Vaccine 0.5 milliLiter(s) IntraMuscular once  insulin glargine Injectable (LANTUS) 2 Unit(s) SubCutaneous at bedtime  insulin lispro (ADMELOG) corrective regimen sliding scale   SubCutaneous three times a day before meals  insulin lispro (ADMELOG) corrective regimen sliding scale   SubCutaneous at bedtime  insulin lispro Injectable (ADMELOG) 2 Unit(s) SubCutaneous three times a day before meals  levothyroxine Injectable 94 MICROGram(s) IV Push at bedtime  mupirocin 2% Ointment 1 Application(s) Topical three times a day  pantoprazole  Injectable 40 milliGRAM(s) IV Push daily  piperacillin/tazobactam IVPB.. 3.375 Gram(s) IV Intermittent every 12 hours  sodium chloride 3%. 500 milliLiter(s) (30 mL/Hr) IV Continuous <Continuous>    MEDICATIONS  (PRN):  acetaminophen     Tablet .. 650 milliGRAM(s) Oral every 6 hours PRN Temp greater or equal to 38C (100.4F), Mild Pain (1 - 3)  aluminum hydroxide/magnesium hydroxide/simethicone Suspension 30 milliLiter(s) Oral every 4 hours PRN Dyspepsia  dextrose Oral Gel 15 Gram(s) Oral once PRN Blood Glucose LESS THAN 70 milliGRAM(s)/deciliter  guaiFENesin Oral Liquid (Sugar-Free) 200 milliGRAM(s) Oral every 6 hours PRN Cough  melatonin 3 milliGRAM(s) Oral at bedtime PRN Insomnia  ondansetron Injectable 4 milliGRAM(s) IV Push every 8 hours PRN Nausea and/or Vomiting  sodium chloride 0.65% Nasal 1 Spray(s) Both Nostrils three times a day PRN nose bleed      LABS:    The Labs were reviewed by me   The Radiology was reviewed by me    EKG tracing reviewed by me    01-24    132<L>  |  92<L>  |  43<H>  ----------------------------<  90  3.9   |  26  |  2.86<H>  01-23    127<L>  |  89<L>  |  44<H>  ----------------------------<  182<H>  3.9   |  21<L>  |  3.10<H>  01-22    122<L>  |  91<L>  |  44<H>  ----------------------------<  139<H>  5.5<H>   |  16<L>  |  3.18<H>    Ca    9.1      24 Jan 2023 06:02  Ca    9.0      23 Jan 2023 05:30  Ca    8.9      22 Jan 2023 03:45  Phos  5.2     01-24  Mg     1.60     01-24      Magnesium, Serum: 1.60 mg/dL (01-24-23 @ 06:02)  Magnesium, Serum: 1.70 mg/dL (01-23-23 @ 05:30)  Magnesium, Serum: 1.90 mg/dL (01-22-23 @ 03:45)    Phosphorus Level, Serum: 5.2 mg/dL (01-24-23 @ 06:02)  Phosphorus Level, Serum: 5.1 mg/dL (01-23-23 @ 05:30)  Phosphorus Level, Serum: 5.0 mg/dL (01-22-23 @ 03:45)                                              7.1    7.59  )-----------( 55       ( 24 Jan 2023 06:02 )             21.4                         7.3    10.00 )-----------( 69       ( 23 Jan 2023 05:30 )             21.4                         7.8    9.63  )-----------( 79       ( 22 Jan 2023 03:45 )             22.1     CAPILLARY BLOOD GLUCOSE  115 (24 Jan 2023 07:32)      POCT Blood Glucose.: 201 mg/dL (24 Jan 2023 16:36)  POCT Blood Glucose.: 184 mg/dL (24 Jan 2023 11:56)  POCT Blood Glucose.: 115 mg/dL (24 Jan 2023 07:32)  POCT Blood Glucose.: 111 mg/dL (23 Jan 2023 21:02)        MICROBIOLOGY:     RADIOLOGY:  [ ] Reviewed and interpreted by me    Point of Care Ultrasound Findings:    PFT:    EKG:

## 2023-01-24 NOTE — PROGRESS NOTE ADULT - PROBLEM SELECTOR PLAN 2
Pt. with hyponatremia in the setting of fluid overload. SNa low/improved to 132 today. Alfie is at 47, U Osm is low at 194, serum cortisol is elevated at 21. Recommend to continue with diuretic therapy, as above. Restrict fluid intake <1L per day. Monitor SNa daily.

## 2023-01-24 NOTE — PROGRESS NOTE ADULT - PROBLEM SELECTOR PLAN 2
Seems to be due to bone marrow suppression in the setting of critical illness as per hematology. Transfuse for Hgb <7. No signs of HI bleeding.   - Continue to trend Hgb   - Transfuse for Hgb <7.

## 2023-01-24 NOTE — PROGRESS NOTE ADULT - SUBJECTIVE AND OBJECTIVE BOX
Hospitalist Progress Note  Sophie Ferreira MD Pager # [phone]    OVERNIGHT EVENTS: LAURYN    SUBJECTIVE / INTERVAL HPI: Patient seen and examined at bedside. Patient reports feeling better since coming to the hospital. SOB is stable. She is having nose bleeds and her nose feels dry.     VITAL SIGNS:  Vital Signs Last 24 Hrs  T(C): 36.6 (24 Jan 2023 10:45), Max: 36.6 (24 Jan 2023 05:00)  T(F): 97.8 (24 Jan 2023 10:45), Max: 97.9 (24 Jan 2023 05:00)  HR: 75 (24 Jan 2023 10:45) (65 - 87)  BP: 160/54 (24 Jan 2023 10:45) (133/80 - 160/71)  BP(mean): --  RR: 18 (24 Jan 2023 10:45) (18 - 18)  SpO2: 98% (24 Jan 2023 10:45) (98% - 100%)    Parameters below as of 24 Jan 2023 10:45  Patient On (Oxygen Delivery Method): nasal cannula  O2 Flow (L/min): 4      PHYSICAL EXAM:    General: Ill appearing female - resting in bed - eating lunch   HEENT: NC/AT; PERRL, anicteric sclera; MMM  Neck: supple  Cardiovascular: +S1/S2; RRR  Respiratory: CTA B/L; no W/R/R  Gastrointestinal: soft, NT/ND; +BSx4  Extremities: WWP; no edema, clubbing or cyanosis  Vascular: 2+ radial, DP/PT pulses B/L  Neurological: AAOx3; no focal deficits    MEDICATIONS:  MEDICATIONS  (STANDING):  albuterol/ipratropium for Nebulization 3 milliLiter(s) Nebulizer every 6 hours  buMETAnide Injectable 2 milliGRAM(s) IV Push every 8 hours  dextrose 5%. 1000 milliLiter(s) (50 mL/Hr) IV Continuous <Continuous>  dextrose 5%. 1000 milliLiter(s) (100 mL/Hr) IV Continuous <Continuous>  dextrose 50% Injectable 25 Gram(s) IV Push once  dextrose 50% Injectable 12.5 Gram(s) IV Push once  dextrose 50% Injectable 25 Gram(s) IV Push once  glucagon  Injectable 1 milliGRAM(s) IntraMuscular once  heparin   Injectable 5000 Unit(s) SubCutaneous every 8 hours  influenza   Vaccine 0.5 milliLiter(s) IntraMuscular once  insulin glargine Injectable (LANTUS) 2 Unit(s) SubCutaneous at bedtime  insulin lispro (ADMELOG) corrective regimen sliding scale   SubCutaneous at bedtime  insulin lispro (ADMELOG) corrective regimen sliding scale   SubCutaneous three times a day before meals  insulin lispro Injectable (ADMELOG) 2 Unit(s) SubCutaneous three times a day before meals  levothyroxine Injectable 94 MICROGram(s) IV Push at bedtime  mupirocin 2% Ointment 1 Application(s) Topical three times a day  pantoprazole  Injectable 40 milliGRAM(s) IV Push daily  piperacillin/tazobactam IVPB.. 3.375 Gram(s) IV Intermittent every 12 hours  sodium chloride 3%. 500 milliLiter(s) (30 mL/Hr) IV Continuous <Continuous>    MEDICATIONS  (PRN):  acetaminophen     Tablet .. 650 milliGRAM(s) Oral every 6 hours PRN Temp greater or equal to 38C (100.4F), Mild Pain (1 - 3)  aluminum hydroxide/magnesium hydroxide/simethicone Suspension 30 milliLiter(s) Oral every 4 hours PRN Dyspepsia  dextrose Oral Gel 15 Gram(s) Oral once PRN Blood Glucose LESS THAN 70 milliGRAM(s)/deciliter  guaiFENesin Oral Liquid (Sugar-Free) 200 milliGRAM(s) Oral every 6 hours PRN Cough  melatonin 3 milliGRAM(s) Oral at bedtime PRN Insomnia  ondansetron Injectable 4 milliGRAM(s) IV Push every 8 hours PRN Nausea and/or Vomiting  sodium chloride 0.65% Nasal 1 Spray(s) Both Nostrils three times a day PRN nose bleed      ALLERGIES:  Allergies    No Known Allergies    Intolerances        LABS:                        7.1    7.59  )-----------( 55       ( 24 Jan 2023 06:02 )             21.4     01-24    132<L>  |  92<L>  |  43<H>  ----------------------------<  90  3.9   |  26  |  2.86<H>    Ca    9.1      24 Jan 2023 06:02  Phos  5.2     01-24  Mg     1.60     01-24          CAPILLARY BLOOD GLUCOSE  115 (24 Jan 2023 07:32)      POCT Blood Glucose.: 184 mg/dL (24 Jan 2023 11:56)      RADIOLOGY & ADDITIONAL TESTS: Reviewed.    ASSESSMENT:    PLAN:

## 2023-01-24 NOTE — PROGRESS NOTE ADULT - PROBLEM SELECTOR PLAN 5
Cr. downtrending today.   - Renal following, ?cardiorenal syndrome   - C/w bumex as above  - Monitor BMP  - Avoid nephrotoxins   - Is/Os  - Discussed with Nephrology attending rec emeka however per pulm no role for emeka cont diuresis

## 2023-01-24 NOTE — PROGRESS NOTE ADULT - SUBJECTIVE AND OBJECTIVE BOX
Chief Complaint: DM2, hypothyroidism    History: tolerating po  denies SOB  no hypoglycemia events    MEDICATIONS  (STANDING):  albuterol/ipratropium for Nebulization 3 milliLiter(s) Nebulizer every 6 hours  buMETAnide Injectable 2 milliGRAM(s) IV Push every 8 hours  dextrose 5%. 1000 milliLiter(s) (50 mL/Hr) IV Continuous <Continuous>  dextrose 5%. 1000 milliLiter(s) (100 mL/Hr) IV Continuous <Continuous>  dextrose 50% Injectable 25 Gram(s) IV Push once  dextrose 50% Injectable 12.5 Gram(s) IV Push once  dextrose 50% Injectable 25 Gram(s) IV Push once  glucagon  Injectable 1 milliGRAM(s) IntraMuscular once  heparin   Injectable 5000 Unit(s) SubCutaneous every 8 hours  influenza   Vaccine 0.5 milliLiter(s) IntraMuscular once  insulin glargine Injectable (LANTUS) 2 Unit(s) SubCutaneous at bedtime  insulin lispro (ADMELOG) corrective regimen sliding scale   SubCutaneous at bedtime  insulin lispro (ADMELOG) corrective regimen sliding scale   SubCutaneous three times a day before meals  insulin lispro Injectable (ADMELOG) 2 Unit(s) SubCutaneous three times a day before meals  levothyroxine Injectable 94 MICROGram(s) IV Push at bedtime  mupirocin 2% Ointment 1 Application(s) Topical three times a day  pantoprazole  Injectable 40 milliGRAM(s) IV Push daily  piperacillin/tazobactam IVPB.. 3.375 Gram(s) IV Intermittent every 12 hours  sodium chloride 3%. 500 milliLiter(s) (30 mL/Hr) IV Continuous <Continuous>    MEDICATIONS  (PRN):  acetaminophen     Tablet .. 650 milliGRAM(s) Oral every 6 hours PRN Temp greater or equal to 38C (100.4F), Mild Pain (1 - 3)  aluminum hydroxide/magnesium hydroxide/simethicone Suspension 30 milliLiter(s) Oral every 4 hours PRN Dyspepsia  dextrose Oral Gel 15 Gram(s) Oral once PRN Blood Glucose LESS THAN 70 milliGRAM(s)/deciliter  guaiFENesin Oral Liquid (Sugar-Free) 200 milliGRAM(s) Oral every 6 hours PRN Cough  melatonin 3 milliGRAM(s) Oral at bedtime PRN Insomnia  ondansetron Injectable 4 milliGRAM(s) IV Push every 8 hours PRN Nausea and/or Vomiting  sodium chloride 0.65% Nasal 1 Spray(s) Both Nostrils three times a day PRN nose bleed      Allergies    No Known Allergies    Intolerances      Review of Systems:    ALL OTHER SYSTEMS REVIEWED AND NEGATIVE      PHYSICAL EXAM:  VITALS: T(C): 36.6 (01-24-23 @ 10:45)  T(F): 97.8 (01-24-23 @ 10:45), Max: 97.9 (01-24-23 @ 05:00)  HR: 75 (01-24-23 @ 10:45) (65 - 87)  BP: 160/54 (01-24-23 @ 10:45) (133/80 - 160/71)  RR:  (18 - 18)  SpO2:  (98% - 100%)  Wt(kg): --  GENERAL: NAD, well-groomed, well-developed  EYES: No proptosis, no lid lag, anicteric  HEENT:  Atraumatic, Normocephalic, moist mucous membranes  RESPIRATORY: nasal cannula in place, nonlabored respirations  PSYCH: Alert and oriented x 3, normal affect, normal mood    CAPILLARY BLOOD GLUCOSE  115 (24 Jan 2023 07:32)      POCT Blood Glucose.: 184 mg/dL (24 Jan 2023 11:56)  POCT Blood Glucose.: 115 mg/dL (24 Jan 2023 07:32)  POCT Blood Glucose.: 111 mg/dL (23 Jan 2023 21:02)  POCT Blood Glucose.: 176 mg/dL (23 Jan 2023 17:04)  POCT Blood Glucose.: 167 mg/dL (23 Jan 2023 16:27)      01-24    132<L>  |  92<L>  |  43<H>  ----------------------------<  90  3.9   |  26  |  2.86<H>    eGFR: 19<L>    Ca    9.1      01-24  Mg     1.60     01-24  Phos  5.2     01-24        A1C with Estimated Average Glucose Result: 6.9 % (01-17-23 @ 07:10)  A1C with Estimated Average Glucose Result: 9.7 % (11-25-22 @ 14:20)  A1C with Estimated Average Glucose Result: 9.1 % (09-29-22 @ 06:00)  A1C with Estimated Average Glucose Result: 9.2 % (09-28-22 @ 07:47)      Thyroid Function Tests:  01-21 @ 04:50 TSH 1.35 FreeT4 -- T3 -- Anti TPO -- Anti Thyroglobulin Ab -- TSI --  01-20 @ 06:04 TSH -- FreeT4 1.6 T3 -- Anti TPO -- Anti Thyroglobulin Ab -- TSI --

## 2023-01-25 LAB
ANION GAP SERPL CALC-SCNC: 14 MMOL/L — SIGNIFICANT CHANGE UP (ref 7–14)
BASE EXCESS BLDA CALC-SCNC: 8.5 MMOL/L — HIGH (ref -2–3)
BASE EXCESS BLDV CALC-SCNC: 6.8 MMOL/L — HIGH (ref -2–3)
BLOOD GAS VENOUS COMPREHENSIVE RESULT: SIGNIFICANT CHANGE UP
BUN SERPL-MCNC: 42 MG/DL — HIGH (ref 7–23)
CALCIUM SERPL-MCNC: 9.2 MG/DL — SIGNIFICANT CHANGE UP (ref 8.4–10.5)
CHLORIDE BLDV-SCNC: 92 MMOL/L — LOW (ref 96–108)
CHLORIDE SERPL-SCNC: 89 MMOL/L — LOW (ref 98–107)
CO2 BLDA-SCNC: 36 MMOL/L — HIGH (ref 19–24)
CO2 BLDV-SCNC: 34.7 MMOL/L — HIGH (ref 22–26)
CO2 SERPL-SCNC: 30 MMOL/L — SIGNIFICANT CHANGE UP (ref 22–31)
CREAT SERPL-MCNC: 2.64 MG/DL — HIGH (ref 0.5–1.3)
EGFR: 21 ML/MIN/1.73M2 — LOW
GAS PNL BLDV: 131 MMOL/L — LOW (ref 136–145)
GLUCOSE BLDC GLUCOMTR-MCNC: 155 MG/DL — HIGH (ref 70–99)
GLUCOSE BLDC GLUCOMTR-MCNC: 233 MG/DL — HIGH (ref 70–99)
GLUCOSE BLDC GLUCOMTR-MCNC: 242 MG/DL — HIGH (ref 70–99)
GLUCOSE BLDC GLUCOMTR-MCNC: 251 MG/DL — HIGH (ref 70–99)
GLUCOSE BLDV-MCNC: 104 MG/DL — HIGH (ref 70–99)
GLUCOSE SERPL-MCNC: 111 MG/DL — HIGH (ref 70–99)
HCO3 BLDA-SCNC: 34 MMOL/L — HIGH (ref 21–28)
HCO3 BLDV-SCNC: 33 MMOL/L — HIGH (ref 22–29)
HCT VFR BLD CALC: 21.9 % — LOW (ref 34.5–45)
HCT VFR BLDA CALC: 23 % — LOW (ref 34.5–46.5)
HGB BLD CALC-MCNC: 7.6 G/DL — LOW (ref 11.5–15.5)
HGB BLD-MCNC: 7.2 G/DL — LOW (ref 11.5–15.5)
HOROWITZ INDEX BLDA+IHG-RTO: 30 — SIGNIFICANT CHANGE UP
LACTATE BLDV-MCNC: 1.9 MMOL/L — SIGNIFICANT CHANGE UP (ref 0.5–2)
MAGNESIUM SERPL-MCNC: 1.5 MG/DL — LOW (ref 1.6–2.6)
MCHC RBC-ENTMCNC: 26.3 PG — LOW (ref 27–34)
MCHC RBC-ENTMCNC: 32.9 GM/DL — SIGNIFICANT CHANGE UP (ref 32–36)
MCV RBC AUTO: 79.9 FL — LOW (ref 80–100)
NRBC # BLD: 0 /100 WBCS — SIGNIFICANT CHANGE UP (ref 0–0)
NRBC # FLD: 0.07 K/UL — HIGH (ref 0–0)
PCO2 BLDA: 52 MMHG — HIGH (ref 32–35)
PCO2 BLDV: 57 MMHG — HIGH (ref 39–42)
PH BLDA: 7.43 — SIGNIFICANT CHANGE UP (ref 7.35–7.45)
PH BLDV: 7.37 — SIGNIFICANT CHANGE UP (ref 7.32–7.43)
PHOSPHATE SERPL-MCNC: 4.5 MG/DL — SIGNIFICANT CHANGE UP (ref 2.5–4.5)
PLATELET # BLD AUTO: 67 K/UL — LOW (ref 150–400)
PO2 BLDA: 130 MMHG — HIGH (ref 83–108)
PO2 BLDV: 94 MMHG — SIGNIFICANT CHANGE UP
POTASSIUM BLDV-SCNC: 3.6 MMOL/L — SIGNIFICANT CHANGE UP (ref 3.5–5.1)
POTASSIUM SERPL-MCNC: 3.7 MMOL/L — SIGNIFICANT CHANGE UP (ref 3.5–5.3)
POTASSIUM SERPL-SCNC: 3.7 MMOL/L — SIGNIFICANT CHANGE UP (ref 3.5–5.3)
RBC # BLD: 2.74 M/UL — LOW (ref 3.8–5.2)
RBC # FLD: 15.6 % — HIGH (ref 10.3–14.5)
SAO2 % BLDA: 99.6 % — HIGH (ref 94–98)
SAO2 % BLDV: 97.6 % — SIGNIFICANT CHANGE UP
SODIUM SERPL-SCNC: 133 MMOL/L — LOW (ref 135–145)
WBC # BLD: 8.26 K/UL — SIGNIFICANT CHANGE UP (ref 3.8–10.5)
WBC # FLD AUTO: 8.26 K/UL — SIGNIFICANT CHANGE UP (ref 3.8–10.5)

## 2023-01-25 PROCEDURE — 99232 SBSQ HOSP IP/OBS MODERATE 35: CPT

## 2023-01-25 PROCEDURE — 99233 SBSQ HOSP IP/OBS HIGH 50: CPT | Mod: 25

## 2023-01-25 PROCEDURE — 99232 SBSQ HOSP IP/OBS MODERATE 35: CPT | Mod: GC

## 2023-01-25 PROCEDURE — 76604 US EXAM CHEST: CPT | Mod: 26,GC

## 2023-01-25 RX ORDER — INSULIN LISPRO 100/ML
4 VIAL (ML) SUBCUTANEOUS
Refills: 0 | Status: DISCONTINUED | OUTPATIENT
Start: 2023-01-25 | End: 2023-01-27

## 2023-01-25 RX ORDER — BUMETANIDE 0.25 MG/ML
2 INJECTION INTRAMUSCULAR; INTRAVENOUS
Refills: 0 | Status: DISCONTINUED | OUTPATIENT
Start: 2023-01-25 | End: 2023-01-29

## 2023-01-25 RX ADMIN — PANTOPRAZOLE SODIUM 40 MILLIGRAM(S): 20 TABLET, DELAYED RELEASE ORAL at 12:27

## 2023-01-25 RX ADMIN — PIPERACILLIN AND TAZOBACTAM 25 GRAM(S): 4; .5 INJECTION, POWDER, LYOPHILIZED, FOR SOLUTION INTRAVENOUS at 05:34

## 2023-01-25 RX ADMIN — Medication 3: at 12:25

## 2023-01-25 RX ADMIN — Medication 2 UNIT(S): at 12:25

## 2023-01-25 RX ADMIN — Medication 1 SPRAY(S): at 12:27

## 2023-01-25 RX ADMIN — Medication 4 UNIT(S): at 17:16

## 2023-01-25 RX ADMIN — INSULIN GLARGINE 2 UNIT(S): 100 INJECTION, SOLUTION SUBCUTANEOUS at 21:52

## 2023-01-25 RX ADMIN — HEPARIN SODIUM 5000 UNIT(S): 5000 INJECTION INTRAVENOUS; SUBCUTANEOUS at 17:14

## 2023-01-25 RX ADMIN — Medication 2 UNIT(S): at 08:00

## 2023-01-25 RX ADMIN — Medication 3 MILLILITER(S): at 15:26

## 2023-01-25 RX ADMIN — BUMETANIDE 2 MILLIGRAM(S): 0.25 INJECTION INTRAMUSCULAR; INTRAVENOUS at 17:18

## 2023-01-25 RX ADMIN — Medication 1: at 08:00

## 2023-01-25 RX ADMIN — Medication 3 MILLILITER(S): at 22:04

## 2023-01-25 RX ADMIN — Medication 2: at 17:16

## 2023-01-25 RX ADMIN — PIPERACILLIN AND TAZOBACTAM 25 GRAM(S): 4; .5 INJECTION, POWDER, LYOPHILIZED, FOR SOLUTION INTRAVENOUS at 17:14

## 2023-01-25 RX ADMIN — Medication 94 MICROGRAM(S): at 21:52

## 2023-01-25 RX ADMIN — HEPARIN SODIUM 5000 UNIT(S): 5000 INJECTION INTRAVENOUS; SUBCUTANEOUS at 09:00

## 2023-01-25 RX ADMIN — MUPIROCIN 1 APPLICATION(S): 20 OINTMENT TOPICAL at 05:37

## 2023-01-25 RX ADMIN — Medication 3 MILLILITER(S): at 10:19

## 2023-01-25 RX ADMIN — BUMETANIDE 2 MILLIGRAM(S): 0.25 INJECTION INTRAMUSCULAR; INTRAVENOUS at 01:36

## 2023-01-25 RX ADMIN — HEPARIN SODIUM 5000 UNIT(S): 5000 INJECTION INTRAVENOUS; SUBCUTANEOUS at 01:41

## 2023-01-25 NOTE — PROGRESS NOTE ADULT - TIME BILLING
Time-based billing (NON-critical care).     50 minutes spent on total encounter; more than 50% of the visit was spent counseling and / or coordinating care by the attending physician.  The necessity of the time spent during the encounter on this date of service was due to:     review of laboratory data, radiology results, consultants' recommendations, documentation in [place], discussion with patient/ACP and interdisciplinary staff (such as , social workers, etc). Interventions were performed as documented above.
Medical management as above, review of results/records, discussion with patient and primary team, documentation.
Medical management as above, review of results/records, discussion with patient and primary team, documentation.
patient care, POCUS
Medical management as above, review of results/records, discussion with patient and primary team, documentation.
Time-based billing (NON-critical care).     50 minutes spent on total encounter; more than 50% of the visit was spent counseling and / or coordinating care by the attending physician.  The necessity of the time spent during the encounter on this date of service was due to:     review of laboratory data, radiology results, consultants' recommendations, documentation in [place], discussion with patient/ACP and interdisciplinary staff (such as , social workers, etc). Interventions were performed as documented above.

## 2023-01-25 NOTE — PROGRESS NOTE ADULT - SUBJECTIVE AND OBJECTIVE BOX
Chief Complaint: Controlled T2DM with hyperglycemia and hypothyroidism     History: Pt seen at bedside. Pt tolerating oral diet. Pt denies nausea and vomiting/any signs of hypoglycemia. Pt reports an adequate.     MEDICATIONS  (STANDING):  albuterol/ipratropium for Nebulization 3 milliLiter(s) Nebulizer every 6 hours  buMETAnide 2 milliGRAM(s) Oral two times a day  dextrose 5%. 1000 milliLiter(s) (50 mL/Hr) IV Continuous <Continuous>  dextrose 5%. 1000 milliLiter(s) (100 mL/Hr) IV Continuous <Continuous>  dextrose 50% Injectable 25 Gram(s) IV Push once  dextrose 50% Injectable 12.5 Gram(s) IV Push once  dextrose 50% Injectable 25 Gram(s) IV Push once  glucagon  Injectable 1 milliGRAM(s) IntraMuscular once  heparin   Injectable 5000 Unit(s) SubCutaneous every 8 hours  influenza   Vaccine 0.5 milliLiter(s) IntraMuscular once  insulin glargine Injectable (LANTUS) 2 Unit(s) SubCutaneous at bedtime  insulin lispro (ADMELOG) corrective regimen sliding scale   SubCutaneous three times a day before meals  insulin lispro (ADMELOG) corrective regimen sliding scale   SubCutaneous at bedtime  insulin lispro Injectable (ADMELOG) 4 Unit(s) SubCutaneous three times a day before meals  levothyroxine Injectable 94 MICROGram(s) IV Push at bedtime  mupirocin 2% Ointment 1 Application(s) Topical three times a day  pantoprazole  Injectable 40 milliGRAM(s) IV Push daily  piperacillin/tazobactam IVPB.. 3.375 Gram(s) IV Intermittent every 12 hours  sodium chloride 3%. 500 milliLiter(s) (30 mL/Hr) IV Continuous <Continuous>    MEDICATIONS  (PRN):  acetaminophen     Tablet .. 650 milliGRAM(s) Oral every 6 hours PRN Temp greater or equal to 38C (100.4F), Mild Pain (1 - 3)  aluminum hydroxide/magnesium hydroxide/simethicone Suspension 30 milliLiter(s) Oral every 4 hours PRN Dyspepsia  dextrose Oral Gel 15 Gram(s) Oral once PRN Blood Glucose LESS THAN 70 milliGRAM(s)/deciliter  guaiFENesin Oral Liquid (Sugar-Free) 200 milliGRAM(s) Oral every 6 hours PRN Cough  melatonin 3 milliGRAM(s) Oral at bedtime PRN Insomnia  ondansetron Injectable 4 milliGRAM(s) IV Push every 8 hours PRN Nausea and/or Vomiting  sodium chloride 0.65% Nasal 1 Spray(s) Both Nostrils three times a day PRN nose bleed      Allergies: No Known Allergies      Review of Systems:  Respiratory: No SOB, no cough  GI: No nausea, vomiting, abdominal pain  Endocrine: no polyuria, polydipsia      PHYSICAL EXAM:  VITALS: T(C): 36.2 (01-25-23 @ 12:18)  T(F): 97.2 (01-25-23 @ 12:18), Max: 97.9 (01-24-23 @ 21:00)  HR: 85 (01-25-23 @ 15:26) (70 - 125)  BP: 160/79 (01-25-23 @ 12:18) (150/70 - 162/72)  RR:  (17 - 18)  SpO2:  (95% - 100%)  Wt(kg): --  GENERAL: NAD, well-groomed, well-developed  RESPIRATORY: No labored breathing   GI: Soft, nontender, non distended  PSYCH: Alert and oriented x 3, normal affect, normal mood      CAPILLARY BLOOD GLUCOSE  POCT Blood Glucose.: 233 mg/dL (25 Jan 2023 16:51)  POCT Blood Glucose.: 251 mg/dL (25 Jan 2023 11:58)  POCT Blood Glucose.: 155 mg/dL (25 Jan 2023 07:35)  POCT Blood Glucose.: 202 mg/dL (24 Jan 2023 21:07)    A1C with Estimated Average Glucose (01.17.23 @ 07:10)    A1C with Estimated Average Glucose Result: 6.9    Estimated Average Glucose: 151      01-25    133<L>  |  89<L>  |  42<H>  ----------------------------<  111<H>  3.7   |  30  |  2.64<H>    eGFR: 21<L>    Ca    9.2      01-25  Mg     1.50     01-25  Phos  4.5     01-25      Thyroid Function Tests:  01-21 @ 04:50 TSH 1.35 FreeT4 -- T3 -- Anti TPO -- Anti Thyroglobulin Ab -- TSI --  01-20 @ 06:04 TSH -- FreeT4 1.6 T3 -- Anti TPO -- Anti Thyroglobulin Ab -- TSI --      Diet, DASH/TLC:   Sodium & Cholesterol Restricted  Consistent Carbohydrate No Snacks (CSTCHO)  1000mL Fluid Restriction ([id number]) (01-20-23 @ 07:52) [Active]

## 2023-01-25 NOTE — PROGRESS NOTE ADULT - ASSESSMENT
58 yo lady PMHx of HFpEF and pulmonary HTN who was admitted for acute on chronic decompensated HF exacerbation and ISAMAR on CKD    #AHRF   #Dyspnea   #Pulm HTN   Patient with moderate pulm HTN on echo and now decreased RV function. Has hx of pulm HTN moderate-severe in past of unclear etiology. CT chest showed bilateral pleural effusion and pulm edema with elevated BNP but clinically does not seem to be volume overloaded.   -Would decrease bumex 2mg to BID   -RHC results with elevated wedge pressure and noted moderate-severe pulm HTN and elevated CVP   -Strict I/O, aim for net negative 1L   -Daily weights

## 2023-01-25 NOTE — CHART NOTE - NSCHARTNOTEFT_GEN_A_CORE
:  Jayy/LeJeune    INDICATION:  Pleff, hypoxia    PROCEDURE:  [ ] LIMITED ECHO  [x ] LIMITED CHEST  [ ] LIMITED RETROPERITONEAL  [ ] LIMITED ABDOMINAL  [ ] LIMITED DVT  [ ] NEEDLE GUIDANCE VASCULAR  [ ] NEEDLE GUIDANCE THORACENTESIS  [ ] NEEDLE GUIDANCE PARACENTESIS  [ ] NEEDLE GUIDANCE PERICARDIOCENTESIS  [ ] OTHER    FINDINGS:  B lines present anteriorly b/l. small b/l pleural effusions.  Lung sliding throughout    INTERPRETATION:  b/l b lines and pleural effusions, not amenable to thoracentesis    Images uploaded to [employer]    Time spent on the procedure was separate from the critical care time spent for patient care.

## 2023-01-25 NOTE — PROGRESS NOTE ADULT - PROBLEM SELECTOR PLAN 1
Pt. with ISAMAR on CKD in the setting of infection and recent IV contrast use. CT scan with IV contrast was done on 1/17. Scr was elevated at 1.52 on 1/18. Scr is elevated/stable at 2.64 today (1/25/23). UOP ~2.6 L in last 24 hrs. Spot urine TP/CR ratio was significantly elevated at 5.9. C3 and C4 were not low. Testing for HIV was negative. STEVO, HBsAg and Hep C Ab were nonreactive/negative, negative ANCA levels and serum VEE. Pt. with LE edema/fluid overload, on IV Bumex therapy. Recommend to reduce IV Bumex 2 mg IV to BID. Cardiology note from 1/24/23 reviewed. Monitor labs and urine output. Avoid any potential nephrotoxins. Dose medications as per eGFR.

## 2023-01-25 NOTE — PROGRESS NOTE ADULT - SUBJECTIVE AND OBJECTIVE BOX
Patient seen and examined at bedside.    Overnight Events:     No AEON     SOB has improved, denies chest pain or palpitations     Review Of Systems: As above       Current Meds:  acetaminophen     Tablet .. 650 milliGRAM(s) Oral every 6 hours PRN  albuterol/ipratropium for Nebulization 3 milliLiter(s) Nebulizer every 6 hours  aluminum hydroxide/magnesium hydroxide/simethicone Suspension 30 milliLiter(s) Oral every 4 hours PRN  buMETAnide Injectable 2 milliGRAM(s) IV Push every 8 hours  dextrose 5%. 1000 milliLiter(s) IV Continuous <Continuous>  dextrose 5%. 1000 milliLiter(s) IV Continuous <Continuous>  dextrose 50% Injectable 25 Gram(s) IV Push once  dextrose 50% Injectable 12.5 Gram(s) IV Push once  dextrose 50% Injectable 25 Gram(s) IV Push once  dextrose Oral Gel 15 Gram(s) Oral once PRN  glucagon  Injectable 1 milliGRAM(s) IntraMuscular once  guaiFENesin Oral Liquid (Sugar-Free) 200 milliGRAM(s) Oral every 6 hours PRN  heparin   Injectable 5000 Unit(s) SubCutaneous every 8 hours  influenza   Vaccine 0.5 milliLiter(s) IntraMuscular once  insulin glargine Injectable (LANTUS) 2 Unit(s) SubCutaneous at bedtime  insulin lispro (ADMELOG) corrective regimen sliding scale   SubCutaneous three times a day before meals  insulin lispro (ADMELOG) corrective regimen sliding scale   SubCutaneous at bedtime  insulin lispro Injectable (ADMELOG) 2 Unit(s) SubCutaneous three times a day before meals  levothyroxine Injectable 94 MICROGram(s) IV Push at bedtime  melatonin 3 milliGRAM(s) Oral at bedtime PRN  mupirocin 2% Ointment 1 Application(s) Topical three times a day  ondansetron Injectable 4 milliGRAM(s) IV Push every 8 hours PRN  pantoprazole  Injectable 40 milliGRAM(s) IV Push daily  piperacillin/tazobactam IVPB.. 3.375 Gram(s) IV Intermittent every 12 hours  sodium chloride 0.65% Nasal 1 Spray(s) Both Nostrils three times a day PRN  sodium chloride 3%. 500 milliLiter(s) IV Continuous <Continuous>      Vitals:  T(F): 97.3 (01-25), Max: 97.9 (01-24)  HR: 70 (01-25) (70 - 106)  BP: 152/71 (01-25) (150/70 - 162/72)  RR: 18 (01-25)  SpO2: 99% (01-25)  I&O's Summary    24 Jan 2023 07:01  -  25 Jan 2023 07:00  --------------------------------------------------------  IN: 1020 mL / OUT: 2600 mL / NET: -1580 mL        Physical Exam:  Appearance: On NC   Cardiovascular: RRR, S1, S2, no murmurs, rubs, or gallops; Trace LE edema, no JVD appreciated   Respiratory: Decreased breath sounds   Musculoskeletal: No clubbing; no joint deformity   Neurologic: Non-focal  Skin: No rashes, ecchymoses, or cyanosis                          7.1    7.59  )-----------( 55       ( 24 Jan 2023 06:02 )             21.4     01-24    132<L>  |  92<L>  |  43<H>  ----------------------------<  90  3.9   |  26  |  2.86<H>    Ca    9.1      24 Jan 2023 06:02  Phos  5.2     01-24  Mg     1.60     01-24                    New ECG(s): Personally reviewed    Echo:    Stress Testing:     Cath:    Imaging:    Interpretation of Telemetry:

## 2023-01-25 NOTE — PROGRESS NOTE ADULT - PROBLEM SELECTOR PLAN 3
Pt. with hyperkalemia in the setting of ISAMAR. Serum potassium WNL at 3.7 today. Low potassium diet. Monitor serum potassium.      If you have any questions, please feel free to contact me  Chuck Harvey  Nephrology Fellow  [phone]/ [employer] Teams(Preferred)  (After 5pm or on weekends please page the on-call fellow).

## 2023-01-25 NOTE — PROGRESS NOTE ADULT - SUBJECTIVE AND OBJECTIVE BOX
Hospitalist Progress Note  Sophie Ferreira MD Pager # [phone]    OVERNIGHT EVENTS: LAURYN    SUBJECTIVE / INTERVAL HPI: Patient seen and examined at bedside. Patient reports SOB is improving. No chest pain. She is having nose bleeds from all of the oxygen.     VITAL SIGNS:  Vital Signs Last 24 Hrs  T(C): 36.2 (25 Jan 2023 12:18), Max: 36.6 (24 Jan 2023 21:00)  T(F): 97.2 (25 Jan 2023 12:18), Max: 97.9 (24 Jan 2023 21:00)  HR: 85 (25 Jan 2023 15:26) (70 - 125)  BP: 160/79 (25 Jan 2023 12:18) (150/70 - 160/79)  BP(mean): --  RR: 17 (25 Jan 2023 12:18) (17 - 18)  SpO2: 100% (25 Jan 2023 15:26) (95% - 100%)    Parameters below as of 25 Jan 2023 15:26  Patient On (Oxygen Delivery Method): nasal cannula        PHYSICAL EXAM:    General: Weak appearing female resting in bed   HEENT: NC/AT; PERRL, anicteric sclera; MMM  Neck: supple  Cardiovascular: +S1/S2; RRR  Respiratory: CTA B/L; no W/R/R  Gastrointestinal: soft, NT/ND; +BSx4  Extremities: WWP; no edema, clubbing or cyanosis  Vascular: 2+ radial, DP/PT pulses B/L  Neurological: AAOx3; no focal deficits    MEDICATIONS:  MEDICATIONS  (STANDING):  albuterol/ipratropium for Nebulization 3 milliLiter(s) Nebulizer every 6 hours  buMETAnide 2 milliGRAM(s) Oral two times a day  dextrose 5%. 1000 milliLiter(s) (50 mL/Hr) IV Continuous <Continuous>  dextrose 5%. 1000 milliLiter(s) (100 mL/Hr) IV Continuous <Continuous>  dextrose 50% Injectable 25 Gram(s) IV Push once  dextrose 50% Injectable 12.5 Gram(s) IV Push once  dextrose 50% Injectable 25 Gram(s) IV Push once  glucagon  Injectable 1 milliGRAM(s) IntraMuscular once  heparin   Injectable 5000 Unit(s) SubCutaneous every 8 hours  influenza   Vaccine 0.5 milliLiter(s) IntraMuscular once  insulin glargine Injectable (LANTUS) 2 Unit(s) SubCutaneous at bedtime  insulin lispro (ADMELOG) corrective regimen sliding scale   SubCutaneous three times a day before meals  insulin lispro (ADMELOG) corrective regimen sliding scale   SubCutaneous at bedtime  insulin lispro Injectable (ADMELOG) 4 Unit(s) SubCutaneous three times a day before meals  levothyroxine Injectable 94 MICROGram(s) IV Push at bedtime  mupirocin 2% Ointment 1 Application(s) Topical three times a day  pantoprazole  Injectable 40 milliGRAM(s) IV Push daily  piperacillin/tazobactam IVPB.. 3.375 Gram(s) IV Intermittent every 12 hours  sodium chloride 3%. 500 milliLiter(s) (30 mL/Hr) IV Continuous <Continuous>    MEDICATIONS  (PRN):  acetaminophen     Tablet .. 650 milliGRAM(s) Oral every 6 hours PRN Temp greater or equal to 38C (100.4F), Mild Pain (1 - 3)  aluminum hydroxide/magnesium hydroxide/simethicone Suspension 30 milliLiter(s) Oral every 4 hours PRN Dyspepsia  dextrose Oral Gel 15 Gram(s) Oral once PRN Blood Glucose LESS THAN 70 milliGRAM(s)/deciliter  guaiFENesin Oral Liquid (Sugar-Free) 200 milliGRAM(s) Oral every 6 hours PRN Cough  melatonin 3 milliGRAM(s) Oral at bedtime PRN Insomnia  ondansetron Injectable 4 milliGRAM(s) IV Push every 8 hours PRN Nausea and/or Vomiting  sodium chloride 0.65% Nasal 1 Spray(s) Both Nostrils three times a day PRN nose bleed      ALLERGIES:  Allergies    No Known Allergies    Intolerances        LABS:                        7.2    8.26  )-----------( 67       ( 25 Jan 2023 04:45 )             21.9     01-25    133<L>  |  89<L>  |  42<H>  ----------------------------<  111<H>  3.7   |  30  |  2.64<H>    Ca    9.2      25 Jan 2023 04:45  Phos  4.5     01-25  Mg     1.50     01-25          CAPILLARY BLOOD GLUCOSE  115 (24 Jan 2023 07:32)      POCT Blood Glucose.: 233 mg/dL (25 Jan 2023 16:51)      RADIOLOGY & ADDITIONAL TESTS: Reviewed.    ASSESSMENT:    PLAN:

## 2023-01-25 NOTE — PROGRESS NOTE ADULT - ASSESSMENT
57F with hypothyroidism, HTN, T2DM, HLD, CKD, PAH, asthma on albuterol presented to the ED w/respiratory distress. Endocrine consulted for hypothyroidism and DM2.    Hypothyroidism, not in myxedema  Taking Levothyroxine 125mcg PO as prescribed. TSH 2.56 on 1/17/23. FT4 1.6 normal.  -Patient had not received 125mcg PO levothyroxine in 2 days for unknown reason. Switched to IV formulation which she received last night.   Proceed with levothyroxine 94mcg daily IV for now. Plan to resume home oral dose at or closer to discharge.  -Low sodium and hypothermia unlikely to be related to hypothyroidism (or adrenal insufficiency). Recommend pursuing non-endocrine workup. Nephrology consulted.  Discharge plan: levothyroxine 125 mcg po daily, take in AM on empty stomach    Controlled T2DM with hyperglycemia  Patient with a hx of DM2. HbA1c 6.9%. Has had DM for 23 years. Takes repaglinide 2mg TIDac and Tresiba 1-12 units depending on BG. Checks BG at bedtime. Sometimes BG in the 200s. No lows. Has a good appetite.  -Hold non-insulin DM agents while inpatient  -BG target 100-180 - above goal today : fasting glucose within normal limits   -Increase Admelog to 4 units premeal TID (please hold if npo/not eating)  -Continue Lantus 2 units qhs  -continue low dose Admelog correction scale pre-meal  -continue low dose Admelog correction scale at bedtime  -Fingerstick BG before meals and bedtime  -Carbohydrate consistent diet  -Hypoglycemia Protocol   -RD consult     Discharge plan:  Likely to discharge patient home on home regimen (see above). Final regimen pending clinical course.  Ensure patient has working glucometer, test strips, lancets, alcohol pads, and BD pastor pen needles  Please also prescribe glucose tabs, Baqsimi nasal spray or glucagon emergency kit for hypoglycemia risk   Endocrine follow up appointment is scheduled with nurse practitioner.  [address] [place] [address] [address], [phone]  March 2nd at 11AM    HLD  -On atorvastatin 40mg daily.   -Continue this if no contraindications.        Marilyn Seals  Nurse Practitioner  Division of Endocrinology & Diabetes  In house pager #[phone]    If before 9AM or after 6PM, or on weekends/holidays, please call endocrine answering service for assistance ([phone]).For nonurgent matters email [email or web address] for assistance.

## 2023-01-25 NOTE — PROGRESS NOTE ADULT - PROBLEM SELECTOR PLAN 7
Patient with two recent hospitalizations, one at [facility] and one at OSH for myxedema coma   - Endo following, currently no evidence of myxedema   - TSH wnl   - C/w iv synthroid

## 2023-01-25 NOTE — PROGRESS NOTE ADULT - PROBLEM SELECTOR PLAN 4
Na improving.  - Suspect multifactorial in setting of volume overload, SIADH  - receiving IV bumex, escalated per nephrology recommendations  - received an infusion of 3% saline 1/20  - Is/Os  - trend BMP regularly; goal correction of Na 4-6 mEq/24h, do not exceed >8 mEq correction in 24h

## 2023-01-25 NOTE — PROGRESS NOTE ADULT - SUBJECTIVE AND OBJECTIVE BOX
CHIEF COMPLAINT:Patient is a 57y old  Female who presents with a chief complaint of Respiratory distress (25 Jan 2023 17:21)      Interval Events:    REVIEW OF SYSTEMS:  [x] All other systems negative except per HPI   [ ] Unable to assess ROS because ________    OBJECTIVE:  ICU Vital Signs Last 24 Hrs  T(C): 36.2 (25 Jan 2023 12:18), Max: 36.6 (24 Jan 2023 21:00)  T(F): 97.2 (25 Jan 2023 12:18), Max: 97.9 (24 Jan 2023 21:00)  HR: 85 (25 Jan 2023 15:26) (70 - 125)  BP: 160/79 (25 Jan 2023 12:18) (150/70 - 162/72)  BP(mean): --  ABP: --  ABP(mean): --  RR: 17 (25 Jan 2023 12:18) (17 - 18)  SpO2: 100% (25 Jan 2023 15:26) (95% - 100%)    O2 Parameters below as of 25 Jan 2023 15:26  Patient On (Oxygen Delivery Method): nasal cannula              01-24 @ 07:01  -  01-25 @ 07:00  --------------------------------------------------------  IN: 1020 mL / OUT: 2600 mL / NET: -1580 mL        PHYSICAL EXAM:  GENERAL: NAD, well-groomed, well-developed  HEAD:  Atraumatic, Normocephalic  EYES: EOMI, PERRLA, conjunctiva and sclera clear  ENMT: No tonsillar erythema, exudates, or enlargement; Moist mucous membranes, Good dentition, No lesions  NECK: Supple, No JVD, Normal thyroid  CHEST/LUNG: Clear to auscultation bilaterally; No rales, rhonchi, wheezing, or rubs  HEART: Regular rate and rhythm; No murmurs, rubs, or gallops  ABDOMEN: Soft, Nontender, Nondistended; Bowel sounds present  VASCULAR:  2+ Peripheral Pulses, No clubbing, cyanosis, or edema  LYMPH: No lymphadenopathy noted  SKIN: No rashes or lesions  NERVOUS SYSTEM:  Alert & Oriented X3, Good concentration; Motor Strength 5/5 B/L upper and lower extremities; DTRs 2+ intact and symmetric    HOSPITAL MEDICATIONS:  MEDICATIONS  (STANDING):  albuterol/ipratropium for Nebulization 3 milliLiter(s) Nebulizer every 6 hours  buMETAnide 2 milliGRAM(s) Oral two times a day  dextrose 5%. 1000 milliLiter(s) (50 mL/Hr) IV Continuous <Continuous>  dextrose 5%. 1000 milliLiter(s) (100 mL/Hr) IV Continuous <Continuous>  dextrose 50% Injectable 25 Gram(s) IV Push once  dextrose 50% Injectable 12.5 Gram(s) IV Push once  dextrose 50% Injectable 25 Gram(s) IV Push once  glucagon  Injectable 1 milliGRAM(s) IntraMuscular once  heparin   Injectable 5000 Unit(s) SubCutaneous every 8 hours  influenza   Vaccine 0.5 milliLiter(s) IntraMuscular once  insulin glargine Injectable (LANTUS) 2 Unit(s) SubCutaneous at bedtime  insulin lispro (ADMELOG) corrective regimen sliding scale   SubCutaneous three times a day before meals  insulin lispro (ADMELOG) corrective regimen sliding scale   SubCutaneous at bedtime  insulin lispro Injectable (ADMELOG) 4 Unit(s) SubCutaneous three times a day before meals  levothyroxine Injectable 94 MICROGram(s) IV Push at bedtime  mupirocin 2% Ointment 1 Application(s) Topical three times a day  pantoprazole  Injectable 40 milliGRAM(s) IV Push daily  piperacillin/tazobactam IVPB.. 3.375 Gram(s) IV Intermittent every 12 hours  sodium chloride 3%. 500 milliLiter(s) (30 mL/Hr) IV Continuous <Continuous>    MEDICATIONS  (PRN):  acetaminophen     Tablet .. 650 milliGRAM(s) Oral every 6 hours PRN Temp greater or equal to 38C (100.4F), Mild Pain (1 - 3)  aluminum hydroxide/magnesium hydroxide/simethicone Suspension 30 milliLiter(s) Oral every 4 hours PRN Dyspepsia  dextrose Oral Gel 15 Gram(s) Oral once PRN Blood Glucose LESS THAN 70 milliGRAM(s)/deciliter  guaiFENesin Oral Liquid (Sugar-Free) 200 milliGRAM(s) Oral every 6 hours PRN Cough  melatonin 3 milliGRAM(s) Oral at bedtime PRN Insomnia  ondansetron Injectable 4 milliGRAM(s) IV Push every 8 hours PRN Nausea and/or Vomiting  sodium chloride 0.65% Nasal 1 Spray(s) Both Nostrils three times a day PRN nose bleed      LABS:    The Labs were reviewed by me   The Radiology was reviewed by me    EKG tracing reviewed by me    01-25    133<L>  |  89<L>  |  42<H>  ----------------------------<  111<H>  3.7   |  30  |  2.64<H>  01-24    132<L>  |  92<L>  |  43<H>  ----------------------------<  90  3.9   |  26  |  2.86<H>  01-23    127<L>  |  89<L>  |  44<H>  ----------------------------<  182<H>  3.9   |  21<L>  |  3.10<H>    Ca    9.2      25 Jan 2023 04:45  Ca    9.1      24 Jan 2023 06:02  Ca    9.0      23 Jan 2023 05:30  Phos  4.5     01-25  Mg     1.50     01-25      Magnesium, Serum: 1.50 mg/dL (01-25-23 @ 04:45)  Magnesium, Serum: 1.60 mg/dL (01-24-23 @ 06:02)  Magnesium, Serum: 1.70 mg/dL (01-23-23 @ 05:30)    Phosphorus Level, Serum: 4.5 mg/dL (01-25-23 @ 04:45)  Phosphorus Level, Serum: 5.2 mg/dL (01-24-23 @ 06:02)  Phosphorus Level, Serum: 5.1 mg/dL (01-23-23 @ 05:30)                      ABG - ( 25 Jan 2023 08:49 )  pH, Arterial: 7.43  pH, Blood: x     /  pCO2: 52    /  pO2: 130   / HCO3: 34    / Base Excess: 8.5   /  SaO2: 99.6                                    7.2    8.26  )-----------( 67       ( 25 Jan 2023 04:45 )             21.9                         7.1    7.59  )-----------( 55       ( 24 Jan 2023 06:02 )             21.4                         7.3    10.00 )-----------( 69       ( 23 Jan 2023 05:30 )             21.4     CAPILLARY BLOOD GLUCOSE      POCT Blood Glucose.: 233 mg/dL (25 Jan 2023 16:51)  POCT Blood Glucose.: 251 mg/dL (25 Jan 2023 11:58)  POCT Blood Glucose.: 155 mg/dL (25 Jan 2023 07:35)  POCT Blood Glucose.: 202 mg/dL (24 Jan 2023 21:07)        MICROBIOLOGY:     RADIOLOGY:  [ ] Reviewed and interpreted by me    Point of Care Ultrasound Findings:    PFT:    EKG:

## 2023-01-25 NOTE — PROGRESS NOTE ADULT - SUBJECTIVE AND OBJECTIVE BOX
[facility] DIVISION OF KIDNEY DISEASES AND HYPERTENSION -- FOLLOW UP NOTE  --------------------------------------------------------------------------------  HPI: 57-year-old female with history of DM, HTN, CHF, CKD, hypothyroidism, PAH, asthma, and HLD admitted at [facility] on 1/18/23 for SOB/pneumonia. Pt. being seen for ISAMAR on CKD and hyponatremia.    24 hour events/subjective: Pt. seen and examined this morning. Reports feeling well, endorses no complaints. Currently on NC.  No CP, SOB, abdominal pain, HA or dizziness.    PAST HISTORY  --------------------------------------------------------------------------------  No significant changes to PMH, PSH, FHx, SHx, unless otherwise noted    ALLERGIES & MEDICATIONS  --------------------------------------------------------------------------------  Allergies    No Known Allergies    Intolerances    Standing Inpatient Medications  albuterol/ipratropium for Nebulization 3 milliLiter(s) Nebulizer every 6 hours  buMETAnide 2 milliGRAM(s) Oral two times a day  dextrose 5%. 1000 milliLiter(s) IV Continuous <Continuous>  dextrose 5%. 1000 milliLiter(s) IV Continuous <Continuous>  dextrose 50% Injectable 25 Gram(s) IV Push once  dextrose 50% Injectable 12.5 Gram(s) IV Push once  dextrose 50% Injectable 25 Gram(s) IV Push once  glucagon  Injectable 1 milliGRAM(s) IntraMuscular once  heparin   Injectable 5000 Unit(s) SubCutaneous every 8 hours  influenza   Vaccine 0.5 milliLiter(s) IntraMuscular once  insulin glargine Injectable (LANTUS) 2 Unit(s) SubCutaneous at bedtime  insulin lispro (ADMELOG) corrective regimen sliding scale   SubCutaneous three times a day before meals  insulin lispro (ADMELOG) corrective regimen sliding scale   SubCutaneous at bedtime  insulin lispro Injectable (ADMELOG) 2 Unit(s) SubCutaneous three times a day before meals  levothyroxine Injectable 94 MICROGram(s) IV Push at bedtime  mupirocin 2% Ointment 1 Application(s) Topical three times a day  pantoprazole  Injectable 40 milliGRAM(s) IV Push daily  piperacillin/tazobactam IVPB.. 3.375 Gram(s) IV Intermittent every 12 hours  sodium chloride 3%. 500 milliLiter(s) IV Continuous <Continuous>    PRN Inpatient Medications  acetaminophen     Tablet .. 650 milliGRAM(s) Oral every 6 hours PRN  aluminum hydroxide/magnesium hydroxide/simethicone Suspension 30 milliLiter(s) Oral every 4 hours PRN  dextrose Oral Gel 15 Gram(s) Oral once PRN  guaiFENesin Oral Liquid (Sugar-Free) 200 milliGRAM(s) Oral every 6 hours PRN  melatonin 3 milliGRAM(s) Oral at bedtime PRN  ondansetron Injectable 4 milliGRAM(s) IV Push every 8 hours PRN  sodium chloride 0.65% Nasal 1 Spray(s) Both Nostrils three times a day PRN    REVIEW OF SYSTEMS  --------------------------------------------------------------------------------  Gen: No fevers/chills   Respiratory: No dyspnea   CV: No chest pain   GI: No abdominal pain   MSK: LE edema   Neuro: No dizziness  All other systems were reviewed and are negative, except as noted.    VITALS/PHYSICAL EXAM  --------------------------------------------------------------------------------  T(C): 36.2 (01-25-23 @ 12:18), Max: 36.6 (01-24-23 @ 21:00)  HR: 71 (01-25-23 @ 14:42) (70 - 125)  BP: 160/79 (01-25-23 @ 12:18) (150/70 - 162/72)  RR: 17 (01-25-23 @ 12:18) (17 - 18)  SpO2: 100% (01-25-23 @ 14:42) (95% - 100%)  Wt(kg): --    01-24-23 @ 07:01  -  01-25-23 @ 07:00  --------------------------------------------------------  IN: 1020 mL / OUT: 2600 mL / NET: -1580 mL    Physical Exam:  	Gen: NAD  	HEENT: MMM, on NC  	Pulm: CTA B/L  	CV: S1S2  	Abd: Soft, +BS   	Ext: No LE edema B/L  	Neuro: Awake  	Skin: Warm and dry  	Vascular access: IV peripheral canula    LABS/STUDIES  --------------------------------------------------------------------------------              7.2    8.26  >-----------<  67       [01-25-23 @ 04:45]              21.9     133  |  89  |  42  ----------------------------<  111      [01-25-23 @ 04:45]  3.7   |  30  |  2.64        Ca     9.2     [01-25-23 @ 04:45]      Mg     1.50     [01-25-23 @ 04:45]      Phos  4.5     [01-25-23 @ 04:45]    Creatinine Trend:  SCr 2.64 [01-25 @ 04:45]  SCr 2.86 [01-24 @ 06:02]  SCr 3.10 [01-23 @ 05:30]  SCr 3.18 [01-22 @ 03:45]  SCr 3.19 [01-21 @ 16:52]    Urinalysis - [01-18-23 @ 05:58]      Color Colorless / Appearance Clear / SG 1.011 / pH 6.0      Gluc Trace / Ketone Negative  / Bili Negative / Urobili <2 mg/dL       Blood Trace / Protein 30 mg/dL / Leuk Est Negative / Nitrite Negative      RBC 4 / WBC 3 / Hyaline 1 / Gran  / Sq Epi  / Non Sq Epi 3 / Bacteria Negative    Urine Creatinine 23      [01-19-23 @ 10:28]  Urine Protein 116      [01-19-23 @ 10:28]    Iron 42, TIBC 306, %sat 14      [01-17-23 @ 07:10]  Ferritin 729      [01-17-23 @ 07:10]  TSH 1.35      [01-21-23 @ 04:50]  Lipid: chol 107, TG 28, HDL 61, LDL --      [11-25-22 @ 21:00]    HBsAg Nonreact      [01-20-23 @ 19:51]  HCV 0.09, Nonreact      [01-20-23 @ 19:51]  HIV Nonreact      [01-20-23 @ 19:51]    STEVO: titer Negative, pattern --      [01-19-23 @ 05:02]  dsDNA <12      [01-19-23 @ 05:02]  C3 Complement 128      [01-19-23 @ 05:02]  C4 Complement 46      [01-19-23 @ 05:02]  Rheumatoid Factor 10      [01-19-23 @ 05:02]  ANCA: cANCA Negative, pANCA Negative, atypical ANCA Indeterminate Method interference due to STEVO Fluorescence      [01-19-23 @ 05:02]  anti-GBM <0.2      [01-19-23 @ 05:02]  Free Light Chains: kappa 8.92, lambda 3.79, ratio = 2.35      [01-19 @ 05:02]  Immunofixation Serum:   No Monoclonal Band Identified. HUMPHREY Mccrary MD    Reference Range: None Detected      [01-19-23 @ 05:02]  SPEP Interpretation: Increased Beta fraction, possibly transferrin increase. HUMPHREY Mccrary MD      [01-19-23 @ 05:02]  Immunofixation Urine:   Reference Range: None Detected      [01-19-23 @ 08:10]

## 2023-01-26 LAB
ANION GAP SERPL CALC-SCNC: 13 MMOL/L — SIGNIFICANT CHANGE UP (ref 7–14)
BLD GP AB SCN SERPL QL: NEGATIVE — SIGNIFICANT CHANGE UP
BUN SERPL-MCNC: 47 MG/DL — HIGH (ref 7–23)
CALCIUM SERPL-MCNC: 8.7 MG/DL — SIGNIFICANT CHANGE UP (ref 8.4–10.5)
CHLORIDE SERPL-SCNC: 90 MMOL/L — LOW (ref 98–107)
CO2 SERPL-SCNC: 31 MMOL/L — SIGNIFICANT CHANGE UP (ref 22–31)
CREAT SERPL-MCNC: 2.3 MG/DL — HIGH (ref 0.5–1.3)
CULTURE RESULTS: SIGNIFICANT CHANGE UP
CULTURE RESULTS: SIGNIFICANT CHANGE UP
EGFR: 24 ML/MIN/1.73M2 — LOW
GLUCOSE BLDC GLUCOMTR-MCNC: 110 MG/DL — HIGH (ref 70–99)
GLUCOSE BLDC GLUCOMTR-MCNC: 174 MG/DL — HIGH (ref 70–99)
GLUCOSE BLDC GLUCOMTR-MCNC: 217 MG/DL — HIGH (ref 70–99)
GLUCOSE BLDC GLUCOMTR-MCNC: 235 MG/DL — HIGH (ref 70–99)
GLUCOSE BLDC GLUCOMTR-MCNC: 238 MG/DL — HIGH (ref 70–99)
GLUCOSE SERPL-MCNC: 156 MG/DL — HIGH (ref 70–99)
HCT VFR BLD CALC: 21.1 % — LOW (ref 34.5–45)
HCT VFR BLD CALC: 23.1 % — LOW (ref 34.5–45)
HGB BLD-MCNC: 6.9 G/DL — CRITICAL LOW (ref 11.5–15.5)
HGB BLD-MCNC: 7.5 G/DL — LOW (ref 11.5–15.5)
MAGNESIUM SERPL-MCNC: 1.7 MG/DL — SIGNIFICANT CHANGE UP (ref 1.6–2.6)
MCHC RBC-ENTMCNC: 26.4 PG — LOW (ref 27–34)
MCHC RBC-ENTMCNC: 26.4 PG — LOW (ref 27–34)
MCHC RBC-ENTMCNC: 32.5 GM/DL — SIGNIFICANT CHANGE UP (ref 32–36)
MCHC RBC-ENTMCNC: 32.7 GM/DL — SIGNIFICANT CHANGE UP (ref 32–36)
MCV RBC AUTO: 80.8 FL — SIGNIFICANT CHANGE UP (ref 80–100)
MCV RBC AUTO: 81.3 FL — SIGNIFICANT CHANGE UP (ref 80–100)
NRBC # BLD: 0 /100 WBCS — SIGNIFICANT CHANGE UP (ref 0–0)
NRBC # BLD: 0 /100 WBCS — SIGNIFICANT CHANGE UP (ref 0–0)
NRBC # FLD: 0.04 K/UL — HIGH (ref 0–0)
NRBC # FLD: 0.04 K/UL — HIGH (ref 0–0)
PHOSPHATE SERPL-MCNC: 3.9 MG/DL — SIGNIFICANT CHANGE UP (ref 2.5–4.5)
PLATELET # BLD AUTO: 64 K/UL — LOW (ref 150–400)
PLATELET # BLD AUTO: 67 K/UL — LOW (ref 150–400)
POTASSIUM SERPL-MCNC: 4.7 MMOL/L — SIGNIFICANT CHANGE UP (ref 3.5–5.3)
POTASSIUM SERPL-SCNC: 4.7 MMOL/L — SIGNIFICANT CHANGE UP (ref 3.5–5.3)
RBC # BLD: 2.61 M/UL — LOW (ref 3.8–5.2)
RBC # BLD: 2.84 M/UL — LOW (ref 3.8–5.2)
RBC # FLD: 15.1 % — HIGH (ref 10.3–14.5)
RBC # FLD: 15.2 % — HIGH (ref 10.3–14.5)
RH IG SCN BLD-IMP: POSITIVE — SIGNIFICANT CHANGE UP
SODIUM SERPL-SCNC: 134 MMOL/L — LOW (ref 135–145)
SPECIMEN SOURCE: SIGNIFICANT CHANGE UP
SPECIMEN SOURCE: SIGNIFICANT CHANGE UP
WBC # BLD: 8.34 K/UL — SIGNIFICANT CHANGE UP (ref 3.8–10.5)
WBC # BLD: 8.85 K/UL — SIGNIFICANT CHANGE UP (ref 3.8–10.5)
WBC # FLD AUTO: 8.34 K/UL — SIGNIFICANT CHANGE UP (ref 3.8–10.5)
WBC # FLD AUTO: 8.85 K/UL — SIGNIFICANT CHANGE UP (ref 3.8–10.5)

## 2023-01-26 PROCEDURE — 99232 SBSQ HOSP IP/OBS MODERATE 35: CPT | Mod: GC

## 2023-01-26 PROCEDURE — 99232 SBSQ HOSP IP/OBS MODERATE 35: CPT

## 2023-01-26 RX ORDER — INSULIN GLARGINE 100 [IU]/ML
3 INJECTION, SOLUTION SUBCUTANEOUS AT BEDTIME
Refills: 0 | Status: DISCONTINUED | OUTPATIENT
Start: 2023-01-26 | End: 2023-01-31

## 2023-01-26 RX ORDER — LABETALOL HCL 100 MG
50 TABLET ORAL EVERY 12 HOURS
Refills: 0 | Status: DISCONTINUED | OUTPATIENT
Start: 2023-01-26 | End: 2023-01-31

## 2023-01-26 RX ADMIN — Medication 2: at 17:12

## 2023-01-26 RX ADMIN — Medication 4 UNIT(S): at 17:13

## 2023-01-26 RX ADMIN — Medication 3 MILLILITER(S): at 08:03

## 2023-01-26 RX ADMIN — Medication 1: at 07:31

## 2023-01-26 RX ADMIN — Medication 94 MICROGRAM(S): at 23:37

## 2023-01-26 RX ADMIN — PANTOPRAZOLE SODIUM 40 MILLIGRAM(S): 20 TABLET, DELAYED RELEASE ORAL at 11:46

## 2023-01-26 RX ADMIN — HEPARIN SODIUM 5000 UNIT(S): 5000 INJECTION INTRAVENOUS; SUBCUTANEOUS at 00:57

## 2023-01-26 RX ADMIN — Medication 4 UNIT(S): at 07:31

## 2023-01-26 RX ADMIN — BUMETANIDE 2 MILLIGRAM(S): 0.25 INJECTION INTRAMUSCULAR; INTRAVENOUS at 17:14

## 2023-01-26 RX ADMIN — PIPERACILLIN AND TAZOBACTAM 25 GRAM(S): 4; .5 INJECTION, POWDER, LYOPHILIZED, FOR SOLUTION INTRAVENOUS at 06:15

## 2023-01-26 RX ADMIN — PIPERACILLIN AND TAZOBACTAM 25 GRAM(S): 4; .5 INJECTION, POWDER, LYOPHILIZED, FOR SOLUTION INTRAVENOUS at 17:16

## 2023-01-26 RX ADMIN — INSULIN GLARGINE 3 UNIT(S): 100 INJECTION, SOLUTION SUBCUTANEOUS at 23:35

## 2023-01-26 RX ADMIN — HEPARIN SODIUM 5000 UNIT(S): 5000 INJECTION INTRAVENOUS; SUBCUTANEOUS at 07:32

## 2023-01-26 RX ADMIN — HEPARIN SODIUM 5000 UNIT(S): 5000 INJECTION INTRAVENOUS; SUBCUTANEOUS at 17:14

## 2023-01-26 RX ADMIN — Medication 50 MILLIGRAM(S): at 17:15

## 2023-01-26 RX ADMIN — Medication 4 UNIT(S): at 11:45

## 2023-01-26 RX ADMIN — BUMETANIDE 2 MILLIGRAM(S): 0.25 INJECTION INTRAMUSCULAR; INTRAVENOUS at 06:14

## 2023-01-26 RX ADMIN — Medication 3 MILLILITER(S): at 21:03

## 2023-01-26 NOTE — PROGRESS NOTE ADULT - PROBLEM SELECTOR PLAN 2
Pt. with hyponatremia in the setting of fluid overload. SNa low/improved to 134 today. Alfie is at 47, U Osm is low at 194, serum cortisol is elevated at 21. Recommend to continue with diuretic therapy, as above. Restrict fluid intake <1L per day. Monitor SNa daily.

## 2023-01-26 NOTE — PROGRESS NOTE ADULT - PROBLEM SELECTOR PLAN 3
Pt. with hyperkalemia in the setting of ISAMAR. Serum potassium WNL at 4.7 today. Low potassium diet. Monitor serum potassium.      If you have any questions, please feel free to contact me  Chuck Harvey  Nephrology Fellow  [phone]/ [employer] Teams(Preferred)  (After 5pm or on weekends please page the on-call fellow).

## 2023-01-26 NOTE — PROGRESS NOTE ADULT - PROBLEM SELECTOR PLAN 7
Patient with two recent hospitalizations, one at [facility] and one at OSH for myxedema coma   - Endo following, currently no evidence of myxedema   - TSH wnl   - C/w iv synthroid

## 2023-01-26 NOTE — PROGRESS NOTE ADULT - PROBLEM SELECTOR PLAN 1
Pt. with ISAMAR on CKD in the setting of infection and recent IV contrast use. CT scan with IV contrast was done on 1/17. Scr was elevated at 1.52 on 1/18. Scr is elevated/stable at 2.30 today (1/26/23). UOP ~1 L in last 24 hrs. Spot urine TP/CR ratio was significantly elevated at 5.9. C3 and C4 were not low. Testing for HIV was negative. STEVO, HBsAg and Hep C Ab were nonreactive/negative, negative ANCA levels and serum VEE. Pt. with LE edema/fluid overload, on Bumex therapy. Recommend to PO Bumex 2 mg BID. Cardiology note from 1/25/23 reviewed. Monitor labs and urine output. Avoid any potential nephrotoxins. Dose medications as per eGFR.

## 2023-01-26 NOTE — PROGRESS NOTE ADULT - SUBJECTIVE AND OBJECTIVE BOX
Patient seen and examined at bedside.    Overnight Events:     No AEON     SOB improved, denies chest pain or palpitaitons     Review Of Systems:As above     Current Meds:  acetaminophen     Tablet .. 650 milliGRAM(s) Oral every 6 hours PRN  albuterol/ipratropium for Nebulization 3 milliLiter(s) Nebulizer every 6 hours  aluminum hydroxide/magnesium hydroxide/simethicone Suspension 30 milliLiter(s) Oral every 4 hours PRN  buMETAnide 2 milliGRAM(s) Oral two times a day  dextrose 5%. 1000 milliLiter(s) IV Continuous <Continuous>  dextrose 5%. 1000 milliLiter(s) IV Continuous <Continuous>  dextrose 50% Injectable 25 Gram(s) IV Push once  dextrose 50% Injectable 12.5 Gram(s) IV Push once  dextrose 50% Injectable 25 Gram(s) IV Push once  dextrose Oral Gel 15 Gram(s) Oral once PRN  glucagon  Injectable 1 milliGRAM(s) IntraMuscular once  guaiFENesin Oral Liquid (Sugar-Free) 200 milliGRAM(s) Oral every 6 hours PRN  heparin   Injectable 5000 Unit(s) SubCutaneous every 8 hours  influenza   Vaccine 0.5 milliLiter(s) IntraMuscular once  insulin glargine Injectable (LANTUS) 2 Unit(s) SubCutaneous at bedtime  insulin lispro (ADMELOG) corrective regimen sliding scale   SubCutaneous three times a day before meals  insulin lispro (ADMELOG) corrective regimen sliding scale   SubCutaneous at bedtime  insulin lispro Injectable (ADMELOG) 4 Unit(s) SubCutaneous three times a day before meals  levothyroxine Injectable 94 MICROGram(s) IV Push at bedtime  melatonin 3 milliGRAM(s) Oral at bedtime PRN  mupirocin 2% Ointment 1 Application(s) Topical three times a day  ondansetron Injectable 4 milliGRAM(s) IV Push every 8 hours PRN  pantoprazole  Injectable 40 milliGRAM(s) IV Push daily  piperacillin/tazobactam IVPB.. 3.375 Gram(s) IV Intermittent every 12 hours  sodium chloride 0.65% Nasal 1 Spray(s) Both Nostrils three times a day PRN  sodium chloride 3%. 500 milliLiter(s) IV Continuous <Continuous>      Vitals:  T(F): 97.4 (01-26), Max: 97.4 (01-25)  HR: 81 (01-26) (71 - 125)  BP: 168/71 (01-26) (160/79 - 175/64)  RR: 17 (01-26)  SpO2: 100% (01-26)  I&O's Summary    25 Jan 2023 07:01  -  26 Jan 2023 07:00  --------------------------------------------------------  IN: 720 mL / OUT: 1000 mL / NET: -280 mL        Physical Exam:  Appearance: No acute distress; On NC   Cardiovascular: RRR, S1, S2, no murmurs, rubs, or gallops; trace LE edema   Respiratory: Clear to auscultation bilaterally  Musculoskeletal: No clubbing; no joint deformity   Neurologic: Non-focal  Psychiatry: AAOx3, mood & affect appropriate  Skin: No rashes, ecchymoses, or cyanosis                          7.2    8.26  )-----------( 67       ( 25 Jan 2023 04:45 )             21.9     01-25    133<L>  |  89<L>  |  42<H>  ----------------------------<  111<H>  3.7   |  30  |  2.64<H>    Ca    9.2      25 Jan 2023 04:45  Phos  4.5     01-25  Mg     1.50     01-25                    New ECG(s):     Echo:    Stress Testing:     Cath:    Imaging:    Interpretation of Telemetry:

## 2023-01-26 NOTE — PROGRESS NOTE ADULT - ASSESSMENT
57F with hypothyroidism, HTN, T2DM, HLD, CKD, PAH, asthma on albuterol presented to the ED w/respiratory distress. Endocrine consulted for hypothyroidism and DM2.    Hypothyroidism, not in myxedema  Taking Levothyroxine 125mcg PO as prescribed. TSH 2.56 on 1/17/23. FT4 1.6 normal.  Patient had not received 125mcg PO levothyroxine in 2 days for unknown reason. Switched to IV formulation which she received last night.   Proceed with levothyroxine 94mcg daily IV for now. Plan to resume home oral dose at or closer to discharge.  Low sodium and hypothermia unlikely to be related to hypothyroidism (or adrenal insufficiency). Recommend pursuing non-endocrine workup. Nephrology consulted.  Discharge plan: levothyroxine 125 mcg po daily, take in AM on empty stomach    Controlled T2DM with hyperglycemia  Patient with a hx of DM2. HbA1c 6.9%. Has had DM for 23 years. Takes repaglinide 2mg TIDac and Tresiba 1-12 units depending on BG. Checks BG at bedtime. Sometimes BG in the 200s. No lows. Has a good appetite.  -Hold non-insulin DM agents while inpatient  -BG target 100-180 - stable today  -Continue Admelog 4 units premeal TID (please hold if npo/not eating)  -Increase Lantus slightly to 3 units qhs  -continue low dose Admelog correction scale pre-meal  -continue low dose Admelog correction scale at bedtime  -Fingerstick BG before meals and bedtime  -Carbohydrate consistent diet  -Hypoglycemia Protocol   -RD consult     Discharge plan:  Likely to discharge patient home on home regimen (see above). Final regimen pending clinical course.  Ensure patient has working glucometer, test strips, lancets, alcohol pads, and BD pastor pen needles  Please also prescribe glucose tabs, Baqsimi nasal spray or glucagon emergency kit for hypoglycemia risk   Endocrine follow up appointment is scheduled with nurse practitioner.  [address] [place] [address] [address], [phone]  March 2nd at 11AM    HTN   -Goal SBP <130/80  -on Labetolol   -Obtain urine micro albumin cr ratio as an outpt   -Titration as per primary team     HLD  -On atorvastatin 40mg daily.   -Continue this if no contraindications.  -Obtain outpt lipid panel         Marilyn Seals  Nurse Practitioner  Division of Endocrinology & Diabetes  In house pager #[phone]    If before 9AM or after 6PM, or on weekends/holidays, please call endocrine answering service for assistance ([phone]).For nonurgent matters email [email or web address] for assistance.  57F with hypothyroidism, HTN, T2DM, HLD, CKD, PAH, asthma on albuterol presented to the ED w/respiratory distress. Endocrine consulted for hypothyroidism and DM2.    Hypothyroidism, not in myxedema  Taking Levothyroxine 125mcg PO as prescribed. TSH 2.56 on 1/17/23. FT4 1.6 normal.  Patient had not received 125mcg PO levothyroxine in 2 days for unknown reason. Switched to IV formulation which she received last night.   Proceed with levothyroxine 94mcg daily IV for now. Plan to resume home oral dose at or closer to discharge.  Low sodium and hypothermia unlikely to be related to hypothyroidism (or adrenal insufficiency). Recommend pursuing non-endocrine workup. Nephrology consulted.  Discharge plan: levothyroxine 125 mcg po daily, take in AM on empty stomach    Controlled T2DM with hyperglycemia  Patient with a hx of DM2. HbA1c 6.9%. Has had DM for 23 years. Takes repaglinide 2mg TIDac and Tresiba 1-12 units depending on BG. Checks BG at bedtime. Sometimes BG in the 200s. No lows. Has a good appetite.  -Hold non-insulin DM agents while inpatient  -BG target 100-180 - stable today  -Continue Admelog 4 units premeal TID (please hold if npo/not eating)  -Increase Lantus slightly to 3 units qhs  -continue low dose Admelog correction scale pre-meal  -continue low dose Admelog correction scale at bedtime  -Fingerstick BG before meals and bedtime  -Carbohydrate consistent diet  -Hypoglycemia Protocol   -RD consult     Discharge plan:  Likely to discharge patient home on home regimen (see above). Final regimen pending clinical course.  Ensure patient has working glucometer, test strips, lancets, alcohol pads, and BD pastor pen needles  Please also prescribe glucose tabs, Baqsimi nasal spray or glucagon emergency kit for hypoglycemia risk   Please follow up with your pcp, endocrinologist Dr. Karma Henriquez, podiatrist, and opthalmologist as an outpt     HTN   -Goal SBP <130/80  -on Labetolol   -Obtain urine micro albumin cr ratio as an outpt   -Titration as per primary team     HLD  -On atorvastatin 40mg daily.   -Continue this if no contraindications.  -Obtain outpt lipid panel         Marilyn Seals  Nurse Practitioner  Division of Endocrinology & Diabetes  In house pager #[phone]    If before 9AM or after 6PM, or on weekends/holidays, please call endocrine answering service for assistance ([phone]).For nonurgent matters email [email or web address] for assistance.

## 2023-01-26 NOTE — PROGRESS NOTE ADULT - SUBJECTIVE AND OBJECTIVE BOX
Hospitalist Progress Note  Sophie Ferreira MD Pager # [phone]    OVERNIGHT EVENTS: LAURYN    SUBJECTIVE / INTERVAL HPI: Patient seen and examined at bedside. Patient reports SOB is improved. No nosebleeds this AM.     VITAL SIGNS:  Vital Signs Last 24 Hrs  T(C): 36.2 (26 Jan 2023 11:57), Max: 36.3 (25 Jan 2023 21:20)  T(F): 97.2 (26 Jan 2023 11:57), Max: 97.4 (25 Jan 2023 21:20)  HR: 71 (26 Jan 2023 11:57) (71 - 86)  BP: 172/76 (26 Jan 2023 11:57) (161/70 - 175/64)  BP(mean): --  RR: 17 (26 Jan 2023 11:57) (17 - 19)  SpO2: 100% (26 Jan 2023 11:57) (100% - 100%)    Parameters below as of 26 Jan 2023 11:57  Patient On (Oxygen Delivery Method): nasal cannula        PHYSICAL EXAM:    General: Weak appearing female resting in bed   HEENT: NC/AT; PERRL, anicteric sclera; MMM  Neck: supple  Cardiovascular: +S1/S2; RRR  Respiratory: CTA B/L; no W/R/R  Gastrointestinal: soft, NT/ND; +BSx4  Extremities: WWP; no edema, clubbing or cyanosis  Vascular: 2+ radial, DP/PT pulses B/L  Neurological: AAOx3; no focal deficits    MEDICATIONS:  MEDICATIONS  (STANDING):  albuterol/ipratropium for Nebulization 3 milliLiter(s) Nebulizer every 6 hours  buMETAnide 2 milliGRAM(s) Oral two times a day  dextrose 5%. 1000 milliLiter(s) (50 mL/Hr) IV Continuous <Continuous>  dextrose 5%. 1000 milliLiter(s) (100 mL/Hr) IV Continuous <Continuous>  dextrose 50% Injectable 25 Gram(s) IV Push once  dextrose 50% Injectable 12.5 Gram(s) IV Push once  dextrose 50% Injectable 25 Gram(s) IV Push once  glucagon  Injectable 1 milliGRAM(s) IntraMuscular once  heparin   Injectable 5000 Unit(s) SubCutaneous every 8 hours  influenza   Vaccine 0.5 milliLiter(s) IntraMuscular once  insulin glargine Injectable (LANTUS) 2 Unit(s) SubCutaneous at bedtime  insulin lispro (ADMELOG) corrective regimen sliding scale   SubCutaneous three times a day before meals  insulin lispro (ADMELOG) corrective regimen sliding scale   SubCutaneous at bedtime  insulin lispro Injectable (ADMELOG) 4 Unit(s) SubCutaneous three times a day before meals  levothyroxine Injectable 94 MICROGram(s) IV Push at bedtime  mupirocin 2% Ointment 1 Application(s) Topical three times a day  pantoprazole  Injectable 40 milliGRAM(s) IV Push daily  piperacillin/tazobactam IVPB.. 3.375 Gram(s) IV Intermittent every 12 hours  sodium chloride 3%. 500 milliLiter(s) (30 mL/Hr) IV Continuous <Continuous>    MEDICATIONS  (PRN):  acetaminophen     Tablet .. 650 milliGRAM(s) Oral every 6 hours PRN Temp greater or equal to 38C (100.4F), Mild Pain (1 - 3)  aluminum hydroxide/magnesium hydroxide/simethicone Suspension 30 milliLiter(s) Oral every 4 hours PRN Dyspepsia  dextrose Oral Gel 15 Gram(s) Oral once PRN Blood Glucose LESS THAN 70 milliGRAM(s)/deciliter  guaiFENesin Oral Liquid (Sugar-Free) 200 milliGRAM(s) Oral every 6 hours PRN Cough  melatonin 3 milliGRAM(s) Oral at bedtime PRN Insomnia  ondansetron Injectable 4 milliGRAM(s) IV Push every 8 hours PRN Nausea and/or Vomiting  sodium chloride 0.65% Nasal 1 Spray(s) Both Nostrils three times a day PRN nose bleed      ALLERGIES:  Allergies    No Known Allergies    Intolerances        LABS:                        7.5    8.85  )-----------( 67       ( 26 Jan 2023 09:22 )             23.1     01-26    134<L>  |  90<L>  |  47<H>  ----------------------------<  156<H>  4.7   |  31  |  2.30<H>    Ca    8.7      26 Jan 2023 06:55  Phos  3.9     01-26  Mg     1.70     01-26          CAPILLARY BLOOD GLUCOSE      POCT Blood Glucose.: 110 mg/dL (26 Jan 2023 11:26)      RADIOLOGY & ADDITIONAL TESTS: Reviewed.    ASSESSMENT:    PLAN:

## 2023-01-26 NOTE — PROGRESS NOTE ADULT - PROBLEM SELECTOR PLAN 1
Pt. presented with hypothermia and hypoxic respiratory failure. Sepsis 2/2 PNA resolved. Hypoxic respiratory failure was due to fluid overload. S/p IV diuresis. Required BiPAP and now on 4L NC. No longer requiring BiPAP. Desaturates to 91% at rest on 2L. RHC performed showing increased wedge pressure and moderate-severe pulm HTN and elevated CVP. Pulm performed lung u/s yesterday and shows small-moderate left pleural effusion, not optimal window for therapeutic thoracentesis.    - Transitioned to oral bumex yesterday, monitoring on oral meds for now   - Wean down on O2 - not on O2 at home   - Will need outpatient cardiology and pulm follow up

## 2023-01-26 NOTE — PROGRESS NOTE ADULT - PROBLEM SELECTOR PLAN 4
Na improving. 134 today.   - Suspect multifactorial in setting of volume overload, SIADH  - receiving IV bumex, escalated per nephrology recommendations  - received an infusion of 3% saline 1/20  - Is/Os  - trend BMP regularly; goal correction of Na 4-6 mEq/24h, do not exceed >8 mEq correction in 24h

## 2023-01-26 NOTE — PROGRESS NOTE ADULT - SUBJECTIVE AND OBJECTIVE BOX
[facility] DIVISION OF KIDNEY DISEASES AND HYPERTENSION -- FOLLOW UP NOTE  --------------------------------------------------------------------------------  HPI: 57-year-old female with history of DM, HTN, CHF, CKD, hypothyroidism, PAH, asthma, and HLD admitted at [facility] on 1/18/23 for SOB/pneumonia. Pt. being seen for ISAMAR on CKD and hyponatremia.    24 hour events/subjective: Pt. seen and examined this morning. Reports feeling well, endorses no complaints. Currently on NC.  No CP, SOB, abdominal pain, HA or dizziness.    PAST HISTORY  --------------------------------------------------------------------------------  No significant changes to PMH, PSH, FHx, SHx, unless otherwise noted    ALLERGIES & MEDICATIONS  --------------------------------------------------------------------------------  Allergies    No Known Allergies    Intolerances    Standing Inpatient Medications  albuterol/ipratropium for Nebulization 3 milliLiter(s) Nebulizer every 6 hours  buMETAnide 2 milliGRAM(s) Oral two times a day  dextrose 5%. 1000 milliLiter(s) IV Continuous <Continuous>  dextrose 5%. 1000 milliLiter(s) IV Continuous <Continuous>  dextrose 50% Injectable 25 Gram(s) IV Push once  dextrose 50% Injectable 12.5 Gram(s) IV Push once  dextrose 50% Injectable 25 Gram(s) IV Push once  glucagon  Injectable 1 milliGRAM(s) IntraMuscular once  heparin   Injectable 5000 Unit(s) SubCutaneous every 8 hours  influenza   Vaccine 0.5 milliLiter(s) IntraMuscular once  insulin glargine Injectable (LANTUS) 2 Unit(s) SubCutaneous at bedtime  insulin lispro (ADMELOG) corrective regimen sliding scale   SubCutaneous three times a day before meals  insulin lispro (ADMELOG) corrective regimen sliding scale   SubCutaneous at bedtime  insulin lispro Injectable (ADMELOG) 4 Unit(s) SubCutaneous three times a day before meals  levothyroxine Injectable 94 MICROGram(s) IV Push at bedtime  mupirocin 2% Ointment 1 Application(s) Topical three times a day  pantoprazole  Injectable 40 milliGRAM(s) IV Push daily  piperacillin/tazobactam IVPB.. 3.375 Gram(s) IV Intermittent every 12 hours  sodium chloride 3%. 500 milliLiter(s) IV Continuous <Continuous>    PRN Inpatient Medications  acetaminophen     Tablet .. 650 milliGRAM(s) Oral every 6 hours PRN  aluminum hydroxide/magnesium hydroxide/simethicone Suspension 30 milliLiter(s) Oral every 4 hours PRN  dextrose Oral Gel 15 Gram(s) Oral once PRN  guaiFENesin Oral Liquid (Sugar-Free) 200 milliGRAM(s) Oral every 6 hours PRN  melatonin 3 milliGRAM(s) Oral at bedtime PRN  ondansetron Injectable 4 milliGRAM(s) IV Push every 8 hours PRN  sodium chloride 0.65% Nasal 1 Spray(s) Both Nostrils three times a day PRN    REVIEW OF SYSTEMS  --------------------------------------------------------------------------------  Gen: No fevers/chills   Respiratory: No dyspnea   CV: No chest pain   GI: No abdominal pain   MSK: LE edema   Neuro: No dizziness  All other systems were reviewed and are negative, except as noted.    VITALS/PHYSICAL EXAM  --------------------------------------------------------------------------------  T(C): 36.3 (01-26-23 @ 06:13), Max: 36.3 (01-25-23 @ 21:20)  HR: 81 (01-26-23 @ 06:13) (71 - 113)  BP: 168/71 (01-26-23 @ 06:13) (160/79 - 175/64)  RR: 17 (01-26-23 @ 06:13) (17 - 19)  SpO2: 100% (01-26-23 @ 06:13) (98% - 100%)  Wt(kg): --    01-25-23 @ 07:01  -  01-26-23 @ 07:00  --------------------------------------------------------  IN: 720 mL / OUT: 1000 mL / NET: -280 mL    Physical Exam:  	Gen: NAD  	HEENT: MMM, on NC  	Pulm: CTA B/L  	CV: S1S2  	Abd: Soft, +BS   	Ext: No LE edema B/L  	Neuro: Awake  	Skin: Warm and dry  	Vascular access: IV peripheral canula    LABS/STUDIES  --------------------------------------------------------------------------------              6.9    8.34  >-----------<  64       [01-26-23 @ 06:55]              21.1     134  |  90  |  47  ----------------------------<  156      [01-26-23 @ 06:55]  4.7   |  31  |  2.30        Ca     8.7     [01-26-23 @ 06:55]      Mg     1.70     [01-26-23 @ 06:55]      Phos  3.9     [01-26-23 @ 06:55]    Creatinine Trend:  SCr 2.30 [01-26 @ 06:55]  SCr 2.64 [01-25 @ 04:45]  SCr 2.86 [01-24 @ 06:02]  SCr 3.10 [01-23 @ 05:30]  SCr 3.18 [01-22 @ 03:45]    Urinalysis - [01-18-23 @ 05:58]      Color Colorless / Appearance Clear / SG 1.011 / pH 6.0      Gluc Trace / Ketone Negative  / Bili Negative / Urobili <2 mg/dL       Blood Trace / Protein 30 mg/dL / Leuk Est Negative / Nitrite Negative      RBC 4 / WBC 3 / Hyaline 1 / Gran  / Sq Epi  / Non Sq Epi 3 / Bacteria Negative    Urine Creatinine 23      [01-19-23 @ 10:28]  Urine Protein 116      [01-19-23 @ 10:28]    Iron 42, TIBC 306, %sat 14      [01-17-23 @ 07:10]  Ferritin 729      [01-17-23 @ 07:10]  TSH 1.35      [01-21-23 @ 04:50]  Lipid: chol 107, TG 28, HDL 61, LDL --      [11-25-22 @ 21:00]    HBsAg Nonreact      [01-20-23 @ 19:51]  HCV 0.09, Nonreact      [01-20-23 @ 19:51]  HIV Nonreact      [01-20-23 @ 19:51]    STEVO: titer Negative, pattern --      [01-19-23 @ 05:02]  dsDNA <12      [01-19-23 @ 05:02]  C3 Complement 128      [01-19-23 @ 05:02]  C4 Complement 46      [01-19-23 @ 05:02]  Rheumatoid Factor 10      [01-19-23 @ 05:02]  ANCA: cANCA Negative, pANCA Negative, atypical ANCA Indeterminate Method interference due to STEVO Fluorescence      [01-19-23 @ 05:02]  anti-GBM <0.2      [01-19-23 @ 05:02]  Free Light Chains: kappa 8.92, lambda 3.79, ratio = 2.35      [01-19 @ 05:02]  Immunofixation Serum:   No Monoclonal Band Identified. HUMPHREY Mccrary MD    Reference Range: None Detected      [01-19-23 @ 05:02]  SPEP Interpretation: Increased Beta fraction, possibly transferrin increase. HUMPHREY Mccrary MD      [01-19-23 @ 05:02]  Immunofixation Urine:   Reference Range: None Detected      [01-19-23 @ 08:10]

## 2023-01-26 NOTE — PROGRESS NOTE ADULT - ASSESSMENT
56 yo lady PMHx of HFpEF and pulmonary HTN who was admitted for acute on chronic decompensated HF exacerbation and ISAMAR on CKD    #AHRF   #Dyspnea   #Pulm HTN   Patient with moderate pulm HTN on echo and now decreased RV function. Has hx of pulm HTN moderate-severe in past of unclear etiology. CT chest showed bilateral pleural effusion and pulm edema with elevated BNP but clinically does not seem to be volume overloaded.   -Continue bumex 2mg PO BID   -RHC results with elevated wedge pressure and noted moderate-severe pulm HTN and elevated CVP   -Strict I/O, aim for net negative 1L   -Daily weights   -Wean O2

## 2023-01-26 NOTE — PROGRESS NOTE ADULT - SUBJECTIVE AND OBJECTIVE BOX
Chief Complaint: Controlled T2DM with hyperglycemia and Hypothyroidism, not in myxedema    History: Pt seen at bedside. Pt tolerating oral diet. Pt denies nausea and vomiting/any signs of hypoglycemia. Pt reports an adequate appetite.     MEDICATIONS  (STANDING):  albuterol/ipratropium for Nebulization 3 milliLiter(s) Nebulizer every 6 hours  buMETAnide 2 milliGRAM(s) Oral two times a day  dextrose 5%. 1000 milliLiter(s) (50 mL/Hr) IV Continuous <Continuous>  dextrose 5%. 1000 milliLiter(s) (100 mL/Hr) IV Continuous <Continuous>  dextrose 50% Injectable 25 Gram(s) IV Push once  dextrose 50% Injectable 12.5 Gram(s) IV Push once  dextrose 50% Injectable 25 Gram(s) IV Push once  glucagon  Injectable 1 milliGRAM(s) IntraMuscular once  heparin   Injectable 5000 Unit(s) SubCutaneous every 8 hours  influenza   Vaccine 0.5 milliLiter(s) IntraMuscular once  insulin glargine Injectable (LANTUS) 2 Unit(s) SubCutaneous at bedtime  insulin lispro (ADMELOG) corrective regimen sliding scale   SubCutaneous three times a day before meals  insulin lispro (ADMELOG) corrective regimen sliding scale   SubCutaneous at bedtime  insulin lispro Injectable (ADMELOG) 4 Unit(s) SubCutaneous three times a day before meals  labetalol 50 milliGRAM(s) Oral every 12 hours  levothyroxine Injectable 94 MICROGram(s) IV Push at bedtime  mupirocin 2% Ointment 1 Application(s) Topical three times a day  pantoprazole  Injectable 40 milliGRAM(s) IV Push daily  piperacillin/tazobactam IVPB.. 3.375 Gram(s) IV Intermittent every 12 hours  sodium chloride 3%. 500 milliLiter(s) (30 mL/Hr) IV Continuous <Continuous>    MEDICATIONS  (PRN):  acetaminophen     Tablet .. 650 milliGRAM(s) Oral every 6 hours PRN Temp greater or equal to 38C (100.4F), Mild Pain (1 - 3)  aluminum hydroxide/magnesium hydroxide/simethicone Suspension 30 milliLiter(s) Oral every 4 hours PRN Dyspepsia  dextrose Oral Gel 15 Gram(s) Oral once PRN Blood Glucose LESS THAN 70 milliGRAM(s)/deciliter  guaiFENesin Oral Liquid (Sugar-Free) 200 milliGRAM(s) Oral every 6 hours PRN Cough  melatonin 3 milliGRAM(s) Oral at bedtime PRN Insomnia  ondansetron Injectable 4 milliGRAM(s) IV Push every 8 hours PRN Nausea and/or Vomiting  sodium chloride 0.65% Nasal 1 Spray(s) Both Nostrils three times a day PRN nose bleed      Allergies: No Known Allergies    Review of Systems:  Respiratory: No SOB, no cough  GI: No nausea, vomiting, abdominal pain  Endocrine: no polyuria, polydipsia      PHYSICAL EXAM:  VITALS: T(C): 36.7 (01-26-23 @ 17:00)  T(F): 98.1 (01-26-23 @ 17:00), Max: 98.1 (01-26-23 @ 17:00)  HR: 79 (01-26-23 @ 17:00) (66 - 81)  BP: 176/76 (01-26-23 @ 17:00) (161/70 - 176/76)  RR:  (17 - 19)  SpO2:  (100% - 100%)  Wt(kg): --  GENERAL: NAD, well-groomed, well-developed  RESPIRATORY: No labored breathing   GI: Soft, nontender, non distended  PSYCH: Alert and oriented x 3, normal affect, normal mood      CAPILLARY BLOOD GLUCOSE  POCT Blood Glucose.: 235 mg/dL (26 Jan 2023 16:56)  POCT Blood Glucose.: 110 mg/dL (26 Jan 2023 11:26)  POCT Blood Glucose.: 174 mg/dL (26 Jan 2023 06:39)  POCT Blood Glucose.: 242 mg/dL (25 Jan 2023 21:46)    A1C with Estimated Average Glucose (01.17.23 @ 07:10)    A1C with Estimated Average Glucose Result: 6.9    Estimated Average Glucose: 151      01-26    134<L>  |  90<L>  |  47<H>  ----------------------------<  156<H>  4.7   |  31  |  2.30<H>    eGFR: 24<L>    Ca    8.7      01-26  Mg     1.70     01-26  Phos  3.9     01-26      Thyroid Function Tests:  01-21 @ 04:50 TSH 1.35 FreeT4 -- T3 -- Anti TPO -- Anti Thyroglobulin Ab -- TSI --  01-20 @ 06:04 TSH -- FreeT4 1.6 T3 -- Anti TPO -- Anti Thyroglobulin Ab -- TSI --      Diet, DASH/TLC:   Sodium & Cholesterol Restricted  Consistent Carbohydrate No Snacks (CSTCHO)  1000mL Fluid Restriction ([id number]) (01-20-23 @ 07:52) [Active]

## 2023-01-27 ENCOUNTER — TRANSCRIPTION ENCOUNTER (OUTPATIENT)
Age: 58
End: 2023-01-27

## 2023-01-27 LAB
ANION GAP SERPL CALC-SCNC: 11 MMOL/L — SIGNIFICANT CHANGE UP (ref 7–14)
BUN SERPL-MCNC: 48 MG/DL — HIGH (ref 7–23)
CALCIUM SERPL-MCNC: 9.1 MG/DL — SIGNIFICANT CHANGE UP (ref 8.4–10.5)
CHLORIDE SERPL-SCNC: 89 MMOL/L — LOW (ref 98–107)
CO2 SERPL-SCNC: 33 MMOL/L — HIGH (ref 22–31)
CREAT SERPL-MCNC: 2 MG/DL — HIGH (ref 0.5–1.3)
EGFR: 29 ML/MIN/1.73M2 — LOW
GLUCOSE BLDC GLUCOMTR-MCNC: 185 MG/DL — HIGH (ref 70–99)
GLUCOSE BLDC GLUCOMTR-MCNC: 270 MG/DL — HIGH (ref 70–99)
GLUCOSE BLDC GLUCOMTR-MCNC: 339 MG/DL — HIGH (ref 70–99)
GLUCOSE SERPL-MCNC: 183 MG/DL — HIGH (ref 70–99)
HCT VFR BLD CALC: 21.6 % — LOW (ref 34.5–45)
HGB BLD-MCNC: 7.1 G/DL — LOW (ref 11.5–15.5)
MAGNESIUM SERPL-MCNC: 1.4 MG/DL — LOW (ref 1.6–2.6)
MCHC RBC-ENTMCNC: 26.5 PG — LOW (ref 27–34)
MCHC RBC-ENTMCNC: 32.9 GM/DL — SIGNIFICANT CHANGE UP (ref 32–36)
MCV RBC AUTO: 80.6 FL — SIGNIFICANT CHANGE UP (ref 80–100)
NRBC # BLD: 0 /100 WBCS — SIGNIFICANT CHANGE UP (ref 0–0)
NRBC # FLD: 0.02 K/UL — HIGH (ref 0–0)
PHOSPHATE SERPL-MCNC: 3.5 MG/DL — SIGNIFICANT CHANGE UP (ref 2.5–4.5)
PLATELET # BLD AUTO: 60 K/UL — LOW (ref 150–400)
POTASSIUM SERPL-MCNC: 4 MMOL/L — SIGNIFICANT CHANGE UP (ref 3.5–5.3)
POTASSIUM SERPL-SCNC: 4 MMOL/L — SIGNIFICANT CHANGE UP (ref 3.5–5.3)
RBC # BLD: 2.68 M/UL — LOW (ref 3.8–5.2)
RBC # FLD: 14.7 % — HIGH (ref 10.3–14.5)
SODIUM SERPL-SCNC: 133 MMOL/L — LOW (ref 135–145)
WBC # BLD: 8.5 K/UL — SIGNIFICANT CHANGE UP (ref 3.8–10.5)
WBC # FLD AUTO: 8.5 K/UL — SIGNIFICANT CHANGE UP (ref 3.8–10.5)

## 2023-01-27 PROCEDURE — 99232 SBSQ HOSP IP/OBS MODERATE 35: CPT | Mod: GC

## 2023-01-27 PROCEDURE — 99232 SBSQ HOSP IP/OBS MODERATE 35: CPT

## 2023-01-27 RX ORDER — INSULIN LISPRO 100/ML
5 VIAL (ML) SUBCUTANEOUS
Refills: 0 | Status: DISCONTINUED | OUTPATIENT
Start: 2023-01-27 | End: 2023-01-29

## 2023-01-27 RX ORDER — MAGNESIUM SULFATE 500 MG/ML
2 VIAL (ML) INJECTION ONCE
Refills: 0 | Status: COMPLETED | OUTPATIENT
Start: 2023-01-27 | End: 2023-01-27

## 2023-01-27 RX ORDER — LEVOTHYROXINE SODIUM 125 MCG
125 TABLET ORAL DAILY
Refills: 0 | Status: DISCONTINUED | OUTPATIENT
Start: 2023-01-28 | End: 2023-01-31

## 2023-01-27 RX ORDER — HYDRALAZINE HCL 50 MG
50 TABLET ORAL EVERY 8 HOURS
Refills: 0 | Status: DISCONTINUED | OUTPATIENT
Start: 2023-01-27 | End: 2023-01-31

## 2023-01-27 RX ADMIN — BUMETANIDE 2 MILLIGRAM(S): 0.25 INJECTION INTRAMUSCULAR; INTRAVENOUS at 14:57

## 2023-01-27 RX ADMIN — BUMETANIDE 2 MILLIGRAM(S): 0.25 INJECTION INTRAMUSCULAR; INTRAVENOUS at 05:45

## 2023-01-27 RX ADMIN — Medication 50 MILLIGRAM(S): at 17:55

## 2023-01-27 RX ADMIN — Medication 3 MILLILITER(S): at 20:34

## 2023-01-27 RX ADMIN — HEPARIN SODIUM 5000 UNIT(S): 5000 INJECTION INTRAVENOUS; SUBCUTANEOUS at 17:55

## 2023-01-27 RX ADMIN — Medication 3 MILLILITER(S): at 03:38

## 2023-01-27 RX ADMIN — HEPARIN SODIUM 5000 UNIT(S): 5000 INJECTION INTRAVENOUS; SUBCUTANEOUS at 08:11

## 2023-01-27 RX ADMIN — PIPERACILLIN AND TAZOBACTAM 25 GRAM(S): 4; .5 INJECTION, POWDER, LYOPHILIZED, FOR SOLUTION INTRAVENOUS at 05:46

## 2023-01-27 RX ADMIN — Medication 3 MILLILITER(S): at 08:46

## 2023-01-27 RX ADMIN — INSULIN GLARGINE 3 UNIT(S): 100 INJECTION, SOLUTION SUBCUTANEOUS at 22:30

## 2023-01-27 RX ADMIN — Medication 4: at 17:39

## 2023-01-27 RX ADMIN — PIPERACILLIN AND TAZOBACTAM 25 GRAM(S): 4; .5 INJECTION, POWDER, LYOPHILIZED, FOR SOLUTION INTRAVENOUS at 17:55

## 2023-01-27 RX ADMIN — PANTOPRAZOLE SODIUM 40 MILLIGRAM(S): 20 TABLET, DELAYED RELEASE ORAL at 13:16

## 2023-01-27 RX ADMIN — Medication 50 MILLIGRAM(S): at 22:32

## 2023-01-27 RX ADMIN — Medication 4 UNIT(S): at 07:56

## 2023-01-27 RX ADMIN — Medication 5 UNIT(S): at 17:39

## 2023-01-27 RX ADMIN — Medication 1: at 07:56

## 2023-01-27 RX ADMIN — Medication 1: at 22:29

## 2023-01-27 RX ADMIN — Medication 25 GRAM(S): at 07:59

## 2023-01-27 RX ADMIN — Medication 3 MILLILITER(S): at 15:25

## 2023-01-27 NOTE — PROGRESS NOTE ADULT - SUBJECTIVE AND OBJECTIVE BOX
[facility] DIVISION OF KIDNEY DISEASES AND HYPERTENSION -- FOLLOW UP NOTE  --------------------------------------------------------------------------------  HPI: 57-year-old female with history of DM, HTN, CHF, CKD, hypothyroidism, PAH, asthma, and HLD admitted at [facility] on 1/18/23 for SOB/pneumonia. Pt. being seen for ISAMAR on CKD and hyponatremia.    24 hour events/subjective: Pt. seen and examined this morning. Reports feeling well, endorses no complaints. Currently on NC.  Wishes to go home. No CP, SOB, abdominal pain, HA or dizziness.    PAST HISTORY  --------------------------------------------------------------------------------  No significant changes to PMH, PSH, FHx, SHx, unless otherwise noted    ALLERGIES & MEDICATIONS  --------------------------------------------------------------------------------  Allergies    No Known Allergies    Intolerances    Standing Inpatient Medications  albuterol/ipratropium for Nebulization 3 milliLiter(s) Nebulizer every 6 hours  buMETAnide 2 milliGRAM(s) Oral two times a day  dextrose 5%. 1000 milliLiter(s) IV Continuous <Continuous>  dextrose 5%. 1000 milliLiter(s) IV Continuous <Continuous>  dextrose 50% Injectable 25 Gram(s) IV Push once  dextrose 50% Injectable 12.5 Gram(s) IV Push once  dextrose 50% Injectable 25 Gram(s) IV Push once  glucagon  Injectable 1 milliGRAM(s) IntraMuscular once  heparin   Injectable 5000 Unit(s) SubCutaneous every 8 hours  influenza   Vaccine 0.5 milliLiter(s) IntraMuscular once  insulin glargine Injectable (LANTUS) 3 Unit(s) SubCutaneous at bedtime  insulin lispro (ADMELOG) corrective regimen sliding scale   SubCutaneous three times a day before meals  insulin lispro (ADMELOG) corrective regimen sliding scale   SubCutaneous at bedtime  insulin lispro Injectable (ADMELOG) 4 Unit(s) SubCutaneous three times a day before meals  labetalol 50 milliGRAM(s) Oral every 12 hours  levothyroxine Injectable 94 MICROGram(s) IV Push at bedtime  mupirocin 2% Ointment 1 Application(s) Topical three times a day  pantoprazole  Injectable 40 milliGRAM(s) IV Push daily  piperacillin/tazobactam IVPB.. 3.375 Gram(s) IV Intermittent every 12 hours  sodium chloride 3%. 500 milliLiter(s) IV Continuous <Continuous>    PRN Inpatient Medications  acetaminophen     Tablet .. 650 milliGRAM(s) Oral every 6 hours PRN  aluminum hydroxide/magnesium hydroxide/simethicone Suspension 30 milliLiter(s) Oral every 4 hours PRN  dextrose Oral Gel 15 Gram(s) Oral once PRN  guaiFENesin Oral Liquid (Sugar-Free) 200 milliGRAM(s) Oral every 6 hours PRN  melatonin 3 milliGRAM(s) Oral at bedtime PRN  ondansetron Injectable 4 milliGRAM(s) IV Push every 8 hours PRN  sodium chloride 0.65% Nasal 1 Spray(s) Both Nostrils three times a day PRN    REVIEW OF SYSTEMS  --------------------------------------------------------------------------------  Gen: No fevers/chills   Respiratory: No dyspnea   CV: No chest pain   GI: No abdominal pain   MSK: LE edema   Neuro: No dizziness  All other systems were reviewed and are negative, except as noted.    VITALS/PHYSICAL EXAM  --------------------------------------------------------------------------------  T(C): 36.3 (01-27-23 @ 05:40), Max: 36.7 (01-26-23 @ 17:00)  HR: 60 (01-27-23 @ 08:48) (56 - 79)  BP: 137/56 (01-27-23 @ 05:40) (137/56 - 176/76)  RR: 18 (01-27-23 @ 05:40) (17 - 18)  SpO2: 96% (01-27-23 @ 08:48) (94% - 100%)  Wt(kg): --    01-26-23 @ 07:01  -  01-27-23 @ 07:00  --------------------------------------------------------  IN: 1050 mL / OUT: 1650 mL / NET: -600 mL    Physical Exam:  	Gen: NAD  	HEENT: MMM, on NC  	Pulm: CTA B/L  	CV: S1S2  	Abd: Soft, +BS   	Ext: No LE edema B/L  	Neuro: Awake  	Skin: Warm and dry  	Vascular access: IV peripheral canula    LABS/STUDIES  --------------------------------------------------------------------------------              7.1    8.50  >-----------<  60       [01-27-23 @ 04:43]              21.6     133  |  89  |  48  ----------------------------<  183      [01-27-23 @ 04:43]  4.0   |  33  |  2.00        Ca     9.1     [01-27-23 @ 04:43]      Mg     1.40     [01-27-23 @ 04:43]      Phos  3.5     [01-27-23 @ 04:43]    Creatinine Trend:  SCr 2.00 [01-27 @ 04:43]  SCr 2.30 [01-26 @ 06:55]  SCr 2.64 [01-25 @ 04:45]  SCr 2.86 [01-24 @ 06:02]  SCr 3.10 [01-23 @ 05:30]    Urinalysis - [01-18-23 @ 05:58]      Color Colorless / Appearance Clear / SG 1.011 / pH 6.0      Gluc Trace / Ketone Negative  / Bili Negative / Urobili <2 mg/dL       Blood Trace / Protein 30 mg/dL / Leuk Est Negative / Nitrite Negative      RBC 4 / WBC 3 / Hyaline 1 / Gran  / Sq Epi  / Non Sq Epi 3 / Bacteria Negative    Iron 42, TIBC 306, %sat 14      [01-17-23 @ 07:10]  Ferritin 729      [01-17-23 @ 07:10]  TSH 1.35      [01-21-23 @ 04:50]  Lipid: chol 107, TG 28, HDL 61, LDL --      [11-25-22 @ 21:00]    HBsAg Nonreact      [01-20-23 @ 19:51]  HCV 0.09, Nonreact      [01-20-23 @ 19:51]  HIV Nonreact      [01-20-23 @ 19:51]    STEVO: titer Negative, pattern --      [01-19-23 @ 05:02]  dsDNA <12      [01-19-23 @ 05:02]  C3 Complement 128      [01-19-23 @ 05:02]  C4 Complement 46      [01-19-23 @ 05:02]  Rheumatoid Factor 10      [01-19-23 @ 05:02]  ANCA: cANCA Negative, pANCA Negative, atypical ANCA Indeterminate Method interference due to STEVO Fluorescence      [01-19-23 @ 05:02]  anti-GBM <0.2      [01-19-23 @ 05:02]  Free Light Chains: kappa 8.92, lambda 3.79, ratio = 2.35      [01-19 @ 05:02]  Immunofixation Serum:   No Monoclonal Band Identified. HUMPHREY Mccrary MD    Reference Range: None Detected      [01-19-23 @ 05:02]  SPEP Interpretation: Increased Beta fraction, possibly transferrin increase. HUMPHREY Mccrary MD      [01-19-23 @ 05:02]  Immunofixation Urine:   Reference Range: None Detected      [01-19-23 @ 08:10]

## 2023-01-27 NOTE — DISCHARGE NOTE PROVIDER - NSDCMRMEDTOKEN_GEN_ALL_CORE_FT
atorvastatin 40 mg oral tablet: 1 tab(s) orally once a day (at bedtime)  hydrALAZINE 50 mg oral tablet: 1 tab(s) orally every 8 hours  labetalol 100 mg oral tablet: 0.5 tab(s) orally 2 times a day  Levemir FlexPen 100 units/mL subcutaneous solution: 14 unit(s) subcutaneous once a day (at bedtime)   levothyroxine 112 mcg (0.112 mg) oral tablet: 1 tab(s) orally once a day  mupirocin 2% topical ointment: 1 application topically 2 times a day  repaglinide 2 mg oral tablet: 1 tab(s) orally 3 times a day (before meals)    alcohol swabs : Apply topically to affected area once a day   atorvastatin 40 mg oral tablet: 1 tab(s) orally once a day (at bedtime)  bumetanide 1 mg oral tablet: 1 tab(s) orally 2 times a day  Glucagon Emergency Kit for Low Blood Sugar 1 mg injection: 1 milligram(s) intramuscular once as needed for severe hypoglycemia, then call 911.  hydrALAZINE 50 mg oral tablet: 1 tab(s) orally every 8 hours  Insulin Pen Needles, 4mm: 1 application subcutaneously once a day . ** Use with insulin pen **   labetalol 100 mg oral tablet: 0.5 tab(s) orally 2 times a day  Levemir FlexPen 100 units/mL subcutaneous solution: 3 unit(s) subcutaneous once a day (at bedtime)   levothyroxine 125 mcg (0.125 mg) oral tablet: 1 tab(s) orally once a day  mupirocin 2% topical ointment: 1 application topically 2 times a day  Prandin 1 mg oral tablet: 1 tab(s) orally 3 times a day (before meals)  Protonix 40 mg oral delayed release tablet: 1 tab(s) orally once a day  test strips (per patient&#x27;s insurance): 1 application subcutaneously 4 times a day. ** Compatible with patient&#x27;s glucometer **

## 2023-01-27 NOTE — PROGRESS NOTE ADULT - PROBLEM SELECTOR PLAN 7
Patient with two recent hospitalizations, one at [facility] and one at OSH for myxedema coma   - Endo following, currently no evidence of myxedema   - TSH wnl   - C/w iv synthroid

## 2023-01-27 NOTE — DISCHARGE NOTE PROVIDER - ATTENDING DISCHARGE PHYSICAL EXAMINATION:
.  VITAL SIGNS:  T(C): 36.2 (01-31-23 @ 11:57), Max: 36.7 (01-31-23 @ 05:25)  T(F): 97.2 (01-31-23 @ 11:57), Max: 98 (01-31-23 @ 05:25)  HR: 56 (01-31-23 @ 11:57) (56 - 80)  BP: 144/65 (01-31-23 @ 11:57) (116/50 - 154/61)  BP(mean): --  RR: 18 (01-31-23 @ 11:57) (18 - 18)  SpO2: 98% (01-31-23 @ 11:57) (98% - 100%)  Wt(kg): --    PHYSICAL EXAM:    Constitutional: Thin appearing female resting comfortably in bed; NAD  Head: NC/AT  Eyes: PERRL, EOMI, anicteric sclera  ENT: no nasal discharge; uvula midline, no oropharyngeal erythema or exudates; MMM  Neck: supple; no JVD or thyromegaly  Respiratory: CTA B/L; no W/R/R, no retractions  Cardiac: +S1/S2; RRR; no M/R/G; PMI non-displaced  Gastrointestinal: abdomen soft, NT/ND; no rebound or guarding; +BSx4  Back: spine midline, no bony tenderness or step-offs; no CVAT B/L  Extremities: WWP, no clubbing or cyanosis; no peripheral edema  Musculoskeletal: NROM x4; no joint swelling, tenderness or erythema  Vascular: 2+ radial, femoral, DP/PT pulses B/L  Dermatologic: skin warm, dry and intact; no rashes, wounds, or scars  Lymphatic: no submandibular or cervical LAD  Neurologic: AAOx3; CNII-XII grossly intact; no focal deficits  Psychiatric: affect and characteristics of appearance, verbalizations, behaviors are appropriate

## 2023-01-27 NOTE — PROGRESS NOTE ADULT - PROBLEM SELECTOR PLAN 1
Pt. with ISAMAR on CKD in the setting of infection and recent IV contrast use. CT scan with IV contrast was done on 1/17. Scr was elevated at 1.52 on 1/18. Scr is elevated/improved at 2 today (1/27/23). UOP ~1.6 L in last 24 hrs. Spot urine TP/CR ratio was significantly elevated at 5.9. C3 and C4 were not low. Testing for HIV was negative. STEVO, HBsAg and Hep C Ab were nonreactive/negative, negative ANCA levels and serum VEE. Pt. with LE edema/fluid overload (improving), on Bumex therapy. Recommend to PO Bumex 2 mg BID. Cardiology note from 1/25/23 reviewed. Monitor labs and urine output. Avoid any potential nephrotoxins. Dose medications as per eGFR.

## 2023-01-27 NOTE — DISCHARGE NOTE NURSING/CASE MANAGEMENT/SOCIAL WORK - PATIENT PORTAL LINK FT
You can access the FollowMyHealth Patient Portal offered by [facility] by registering at the following website: [email or web address]. By joining [employer]’s FollowMyHealth portal, you will also be able to view your health information using other applications (apps) compatible with our system.

## 2023-01-27 NOTE — PROGRESS NOTE ADULT - PROBLEM SELECTOR PLAN 2
Pt. with hyponatremia in the setting of fluid overload. SNa low/improved to 133 today. Alfie is at 47, U Osm is low at 194, serum cortisol is elevated at 21. Recommend to continue with diuretic therapy, as above. Restrict fluid intake <1L per day. Monitor SNa daily.

## 2023-01-27 NOTE — PROGRESS NOTE ADULT - ASSESSMENT
57F with hypothyroidism, HTN, T2DM, HLD, CKD, PAH, asthma on albuterol presented to the ED w/respiratory distress. Endocrine consulted for hypothyroidism and DM2.    Hypothyroidism, not in myxedema  Taking Levothyroxine 125 mcg PO as prescribed. TSH 2.56 on 1/17/23. FT4 1.6 normal.  Patient had not received 125 mcg PO levothyroxine for 2 days, unclear why, Switched to IV formulation at 94 mcg  Will stop IV dose tonight to transition to oral therapy tomorrow morning   Low sodium and hypothermia unlikely to be related to hypothyroidism (or adrenal insufficiency). Defer to non-endocrine workup. Nephrology following   Discharge plan: levothyroxine 125 mcg po daily, take in AM on empty stomach    Controlled T2DM with hyperglycemia  Patient with a hx of DM2. HbA1c 6.9%. Has had DM for 23 years. Takes repaglinide 2mg TIDac and Tresiba 1-12 units depending on BG. Checks BG at bedtime. Sometimes BG in the 200s. No lows. Has a good appetite.  -Hold non-insulin DM agents while inpatient  -BG target 100-180 - prandial glucose above goal   -Increasing Admelog to 5 units premeal TID (please hold if npo/not eating)  -Increase Lantus slightly to 3 units qhs  -continue low dose Admelog correction scale pre-meal  -continue low dose Admelog correction scale at bedtime  -Fingerstick BG before meals and bedtime  -Carbohydrate consistent diet  -Hypoglycemia Protocol   -RD consult completed     Discharge plan:  Likely to discharge patient home on home regimen (see above). Final regimen pending clinical course.  Ensure patient has working glucometer, test strips, lancets, alcohol pads, and BD pastor pen needles  Please also prescribe glucose tabs, Baqsimi nasal spray or glucagon emergency kit for hypoglycemia risk   Please follow up with your pcp, endocrinologist Dr. Karma Henriquez, podiatrist, and opthalmologist as an outpt     HTN   -Goal SBP <130/80  -on Labetolol   -Obtain urine micro albumin cr ratio as an outpt   -Titration as per primary team     HLD  -On atorvastatin 40mg daily.   -Continue this if no contraindications.  -Obtain outpt lipid panel     KERRIE Gerardo-BC  Nurse Practitioner   Division of Endocrinology  Pager: EDEN pager [phone]    If out of hospital/unavailable when paged, please note: patient will be cared for by another provider on the endocrine service.  Please call the endocrine answering service for assistance to reach covering provider ([phone]). For non-urgent matters, please email [email or web address] for assistance.

## 2023-01-27 NOTE — DISCHARGE NOTE PROVIDER - CARE PROVIDER_API CALL
Jonel Coyle)  Nephrology  [address] [place] [address], [address] [place] [address]  Phone: [phone]  Fax: [phone]  Follow Up Time:

## 2023-01-27 NOTE — DISCHARGE NOTE PROVIDER - NSDCCPCAREPLAN_GEN_ALL_CORE_FT
PRINCIPAL DISCHARGE DIAGNOSIS  Diagnosis: SOB (shortness of breath)  Assessment and Plan of Treatment:       SECONDARY DISCHARGE DIAGNOSES  Diagnosis: Acute kidney injury superimposed on CKD  Assessment and Plan of Treatment:     Diagnosis: Heart failure  Assessment and Plan of Treatment:     Diagnosis: Type 2 diabetes mellitus  Assessment and Plan of Treatment:     Diagnosis: Hypothyroidism  Assessment and Plan of Treatment:      PRINCIPAL DISCHARGE DIAGNOSIS  Diagnosis: SOB (shortness of breath)  Assessment and Plan of Treatment: You were short of breath and required a lot of oxygen to help you breathe. You had pneumonia and fluid on your lungs. You were treated with antibiotics for the pneumonia. You received medications to help you urinate off the fluid. These medications helped you get better. You no longer need oxygen. You need to follow up with the pulmonary doctor and cardiology doctor in the office for further care.      SECONDARY DISCHARGE DIAGNOSES  Diagnosis: Acute kidney injury superimposed on CKD  Assessment and Plan of Treatment: Your kidney's were injured and the creatinine level went high. It improved a little and remained around 2. We discussed with the kidney doctors and they are okay with managing your kidneys as an outpatient. Please follow up with the kidney doctors.    Diagnosis: Heart failure  Assessment and Plan of Treatment: You have a history of diastolic heart failure. Please continue to take your medications and follow up with your cardiologist.    Diagnosis: Type 2 diabetes mellitus  Assessment and Plan of Treatment:     Diagnosis: Hypothyroidism  Assessment and Plan of Treatment: You have a history of hypothyrodism and have had a severe complication from your thyroid levels being too low. 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 Our endocrinology doctors were seeing you while you were in the hospital to help get your thyroid levels better. Please continue to take synthroid 125mcg once a day (on an empty stomach in the AM).     PRINCIPAL DISCHARGE DIAGNOSIS  Diagnosis: SOB (shortness of breath)  Assessment and Plan of Treatment: You were short of breath and required a lot of oxygen to help you breathe. You had pneumonia and fluid on your lungs. You were treated with antibiotics for the pneumonia. You received medications to help you urinate off the fluid. These medications helped you get better. You no longer need oxygen. You need to follow up with the pulmonary doctor and cardiology doctor in the office for further care.      SECONDARY DISCHARGE DIAGNOSES  Diagnosis: Acute kidney injury superimposed on CKD  Assessment and Plan of Treatment: Your kidney's were injured and the creatinine level went high. It improved a little and remained around 2. We discussed with the kidney doctors and they are okay with managing your kidneys as an outpatient. Please follow up with the kidney doctors.    Diagnosis: Heart failure  Assessment and Plan of Treatment: You have a history of diastolic heart failure. Please continue to take your medications and follow up with your cardiologist.    Diagnosis: Type 2 diabetes mellitus  Assessment and Plan of Treatment: You should go down on your Prandin dosing to once a day and should continue to take 3 units of your insulin at bedtime. Record your morning blood sugars in the morning and give these to your endrocrine team to further adjust your medications    Diagnosis: Hypothyroidism  Assessment and Plan of Treatment: You have a history of hypothyrodism and have had a severe complication from your thyroid levels being too low. 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 Our endocrinology doctors were seeing you while you were in the hospital to help get your thyroid levels better. Please continue to take synthroid 125mcg once a day (on an empty stomach in the AM).

## 2023-01-27 NOTE — PROGRESS NOTE ADULT - ASSESSMENT
58 yo lady PMHx of HFpEF and pulmonary HTN who was admitted for acute on chronic decompensated HF exacerbation and ISAMAR on CKD    #AHRF   #Dyspnea   #Pulm HTN   Patient with moderate pulm HTN on echo and now decreased RV function. Has hx of pulm HTN moderate-severe in past of unclear etiology. CT chest showed bilateral pleural effusion and pulm edema with elevated BNP but clinically does not seem to be volume overloaded.   -Continue bumex 2mg PO BID   -RHC results with elevated wedge pressure and noted moderate-severe pulm HTN and elevated CVP   -Strict I/O, aim for net negative 1L   -Daily weights   -Wean O2

## 2023-01-27 NOTE — PROGRESS NOTE ADULT - SUBJECTIVE AND OBJECTIVE BOX
Hospitalist Progress Note  Sophie Ferreira MD Pager # [phone]    OVERNIGHT EVENTS: LAURYN    SUBJECTIVE / INTERVAL HPI: Patient seen and examined at bedside. Patient reports that she is feeling better. She has been walking back and forth to the bathroom and has not been experiencing SOB. All other ROS negative.     VITAL SIGNS:  Vital Signs Last 24 Hrs  T(C): 36.8 (27 Jan 2023 14:23), Max: 36.8 (27 Jan 2023 14:23)  T(F): 98.2 (27 Jan 2023 14:23), Max: 98.2 (27 Jan 2023 14:23)  HR: 62 (27 Jan 2023 15:25) (56 - 79)  BP: 151/60 (27 Jan 2023 14:23) (137/56 - 176/76)  BP(mean): --  RR: 18 (27 Jan 2023 14:23) (18 - 18)  SpO2: 98% (27 Jan 2023 15:29) (94% - 100%)    Parameters below as of 27 Jan 2023 15:29  Patient On (Oxygen Delivery Method): room air        PHYSICAL EXAM:    General: Thin appearing female resting in bed - comfortable  HEENT: NC/AT; PERRL, anicteric sclera; MMM  Neck: supple  Cardiovascular: +S1/S2; RRR  Respiratory: CTA B/L; no W/R/R  Gastrointestinal: soft, NT/ND; +BSx4  Extremities: WWP; no edema, clubbing or cyanosis  Vascular: 2+ radial, DP/PT pulses B/L  Neurological: AAOx3; no focal deficits    MEDICATIONS:  MEDICATIONS  (STANDING):  albuterol/ipratropium for Nebulization 3 milliLiter(s) Nebulizer every 6 hours  buMETAnide 2 milliGRAM(s) Oral two times a day  dextrose 5%. 1000 milliLiter(s) (50 mL/Hr) IV Continuous <Continuous>  dextrose 5%. 1000 milliLiter(s) (100 mL/Hr) IV Continuous <Continuous>  dextrose 50% Injectable 25 Gram(s) IV Push once  dextrose 50% Injectable 12.5 Gram(s) IV Push once  dextrose 50% Injectable 25 Gram(s) IV Push once  glucagon  Injectable 1 milliGRAM(s) IntraMuscular once  heparin   Injectable 5000 Unit(s) SubCutaneous every 8 hours  influenza   Vaccine 0.5 milliLiter(s) IntraMuscular once  insulin glargine Injectable (LANTUS) 3 Unit(s) SubCutaneous at bedtime  insulin lispro (ADMELOG) corrective regimen sliding scale   SubCutaneous three times a day before meals  insulin lispro (ADMELOG) corrective regimen sliding scale   SubCutaneous at bedtime  insulin lispro Injectable (ADMELOG) 4 Unit(s) SubCutaneous three times a day before meals  labetalol 50 milliGRAM(s) Oral every 12 hours  levothyroxine Injectable 94 MICROGram(s) IV Push at bedtime  mupirocin 2% Ointment 1 Application(s) Topical three times a day  pantoprazole  Injectable 40 milliGRAM(s) IV Push daily  piperacillin/tazobactam IVPB.. 3.375 Gram(s) IV Intermittent every 12 hours  sodium chloride 3%. 500 milliLiter(s) (30 mL/Hr) IV Continuous <Continuous>    MEDICATIONS  (PRN):  acetaminophen     Tablet .. 650 milliGRAM(s) Oral every 6 hours PRN Temp greater or equal to 38C (100.4F), Mild Pain (1 - 3)  aluminum hydroxide/magnesium hydroxide/simethicone Suspension 30 milliLiter(s) Oral every 4 hours PRN Dyspepsia  dextrose Oral Gel 15 Gram(s) Oral once PRN Blood Glucose LESS THAN 70 milliGRAM(s)/deciliter  guaiFENesin Oral Liquid (Sugar-Free) 200 milliGRAM(s) Oral every 6 hours PRN Cough  melatonin 3 milliGRAM(s) Oral at bedtime PRN Insomnia  ondansetron Injectable 4 milliGRAM(s) IV Push every 8 hours PRN Nausea and/or Vomiting  sodium chloride 0.65% Nasal 1 Spray(s) Both Nostrils three times a day PRN nose bleed      ALLERGIES:  Allergies    No Known Allergies    Intolerances        LABS:                        7.1    8.50  )-----------( 60       ( 27 Jan 2023 04:43 )             21.6     01-27    133<L>  |  89<L>  |  48<H>  ----------------------------<  183<H>  4.0   |  33<H>  |  2.00<H>    Ca    9.1      27 Jan 2023 04:43  Phos  3.5     01-27  Mg     1.40     01-27          CAPILLARY BLOOD GLUCOSE      POCT Blood Glucose.: 185 mg/dL (27 Jan 2023 07:36)      RADIOLOGY & ADDITIONAL TESTS: Reviewed.    ASSESSMENT:    PLAN:

## 2023-01-27 NOTE — DISCHARGE NOTE PROVIDER - NSDCFUSCHEDAPPT_GEN_ALL_CORE_FT
[facility]  [place] [id number] [facility]  Scheduled Appointment: 03/02/2023

## 2023-01-27 NOTE — PROGRESS NOTE ADULT - PROBLEM SELECTOR PLAN 1
Pt. presented with hypothermia and hypoxic respiratory failure. Sepsis 2/2 PNA resolved. Hypoxic respiratory failure was due to fluid overload. S/p IV diuresis. Required BiPAP and now on 4L NC. No longer requiring BiPAP. Desaturates to 91% at rest on 2L. RHC performed showing increased wedge pressure and moderate-severe pulm HTN and elevated CVP. Pulm performed lung u/s this week and shows small-moderate left pleural effusion, not optimal window for therapeutic thoracentesis.    - Transitioned to oral bumex on 1/25, monitoring on oral meds for now   - Has been on RA all day - if ambulatory O2 sat is stable then she is stable to go home - pending   - Will need outpatient cardiology and pulm follow up

## 2023-01-27 NOTE — PROGRESS NOTE ADULT - SUBJECTIVE AND OBJECTIVE BOX
Patient seen and examined at bedside.    Overnight Events:     No AEON     SOB improved, denies orthopnea, denies chest pain or palpitations     Review Of Systems: As above     Current Meds:  acetaminophen     Tablet .. 650 milliGRAM(s) Oral every 6 hours PRN  albuterol/ipratropium for Nebulization 3 milliLiter(s) Nebulizer every 6 hours  aluminum hydroxide/magnesium hydroxide/simethicone Suspension 30 milliLiter(s) Oral every 4 hours PRN  buMETAnide 2 milliGRAM(s) Oral two times a day  dextrose 5%. 1000 milliLiter(s) IV Continuous <Continuous>  dextrose 5%. 1000 milliLiter(s) IV Continuous <Continuous>  dextrose 50% Injectable 25 Gram(s) IV Push once  dextrose 50% Injectable 12.5 Gram(s) IV Push once  dextrose 50% Injectable 25 Gram(s) IV Push once  dextrose Oral Gel 15 Gram(s) Oral once PRN  glucagon  Injectable 1 milliGRAM(s) IntraMuscular once  guaiFENesin Oral Liquid (Sugar-Free) 200 milliGRAM(s) Oral every 6 hours PRN  heparin   Injectable 5000 Unit(s) SubCutaneous every 8 hours  influenza   Vaccine 0.5 milliLiter(s) IntraMuscular once  insulin glargine Injectable (LANTUS) 3 Unit(s) SubCutaneous at bedtime  insulin lispro (ADMELOG) corrective regimen sliding scale   SubCutaneous three times a day before meals  insulin lispro (ADMELOG) corrective regimen sliding scale   SubCutaneous at bedtime  insulin lispro Injectable (ADMELOG) 4 Unit(s) SubCutaneous three times a day before meals  labetalol 50 milliGRAM(s) Oral every 12 hours  levothyroxine Injectable 94 MICROGram(s) IV Push at bedtime  melatonin 3 milliGRAM(s) Oral at bedtime PRN  mupirocin 2% Ointment 1 Application(s) Topical three times a day  ondansetron Injectable 4 milliGRAM(s) IV Push every 8 hours PRN  pantoprazole  Injectable 40 milliGRAM(s) IV Push daily  piperacillin/tazobactam IVPB.. 3.375 Gram(s) IV Intermittent every 12 hours  sodium chloride 0.65% Nasal 1 Spray(s) Both Nostrils three times a day PRN  sodium chloride 3%. 500 milliLiter(s) IV Continuous <Continuous>      Vitals:  T(F): 97.4 (01-27), Max: 98.1 (01-26)  HR: 57 (01-27) (56 - 79)  BP: 137/56 (01-27) (137/56 - 176/76)  RR: 18 (01-27)  SpO2: 94% (01-27)  I&O's Summary    26 Jan 2023 07:01  -  27 Jan 2023 07:00  --------------------------------------------------------  IN: 650 mL / OUT: 750 mL / NET: -100 mL        Physical Exam:  Appearance: No acute distress; on NC   Cardiovascular: RRR, S1, S2, no murmurs, rubs, or gallops; trace LE edema   Respiratory: Clear to auscultation bilaterally  Musculoskeletal: No clubbing; no joint deformity   Neurologic: Non-focal  Psychiatry: AAOx3, mood & affect appropriate  Skin: No rashes, ecchymoses, or cyanosis                          7.1    8.50  )-----------( 60       ( 27 Jan 2023 04:43 )             21.6     01-27    133<L>  |  89<L>  |  48<H>  ----------------------------<  183<H>  4.0   |  33<H>  |  2.00<H>    Ca    9.1      27 Jan 2023 04:43  Phos  3.5     01-27  Mg     1.40     01-27                    New ECG(s):     Echo:    Stress Testing:     Cath:    Imaging:    Interpretation of Telemetry:

## 2023-01-27 NOTE — PROGRESS NOTE ADULT - PROBLEM SELECTOR PLAN 4
Resolved  - Suspect multifactorial in setting of volume overload, SIADH  - receiving IV bumex, escalated per nephrology recommendations  - received an infusion of 3% saline 1/20  - Is/Os  - trend BMP regularly; goal correction of Na 4-6 mEq/24h, do not exceed >8 mEq correction in 24h

## 2023-01-27 NOTE — PROGRESS NOTE ADULT - PROBLEM SELECTOR PLAN 5
Creatinine continues to downtrend  - Renal following, ?cardiorenal syndrome   - C/w bumex as above  - Monitor BMP  - Avoid nephrotoxins   - Is/Os  - Discussed with Nephrology attending rec emeka however per pulm no role for thora cont diuresis

## 2023-01-27 NOTE — PROGRESS NOTE ADULT - SUBJECTIVE AND OBJECTIVE BOX
Chief Complaint: Controlled T2DM with hyperglycemia and Hypothyroidism, not in myxedema    History: Pt seen at bedside. Pt tolerating oral diet. Pt denies nausea and vomiting/any signs of hypoglycemia. Pt reports an adequate appetite. EMAR review: patient refused lunch pre-meal insulin today     MEDICATIONS  (STANDING):  albuterol/ipratropium for Nebulization 3 milliLiter(s) Nebulizer every 6 hours  buMETAnide 2 milliGRAM(s) Oral two times a day  dextrose 5%. 1000 milliLiter(s) (50 mL/Hr) IV Continuous <Continuous>  dextrose 5%. 1000 milliLiter(s) (100 mL/Hr) IV Continuous <Continuous>  dextrose 50% Injectable 25 Gram(s) IV Push once  dextrose 50% Injectable 12.5 Gram(s) IV Push once  dextrose 50% Injectable 25 Gram(s) IV Push once  glucagon  Injectable 1 milliGRAM(s) IntraMuscular once  heparin   Injectable 5000 Unit(s) SubCutaneous every 8 hours  influenza   Vaccine 0.5 milliLiter(s) IntraMuscular once  insulin glargine Injectable (LANTUS) 3 Unit(s) SubCutaneous at bedtime  insulin lispro (ADMELOG) corrective regimen sliding scale   SubCutaneous three times a day before meals  insulin lispro (ADMELOG) corrective regimen sliding scale   SubCutaneous at bedtime  insulin lispro Injectable (ADMELOG) 4 Unit(s) SubCutaneous three times a day before meals  labetalol 50 milliGRAM(s) Oral every 12 hours  levothyroxine Injectable 94 MICROGram(s) IV Push at bedtime  mupirocin 2% Ointment 1 Application(s) Topical three times a day  pantoprazole  Injectable 40 milliGRAM(s) IV Push daily  piperacillin/tazobactam IVPB.. 3.375 Gram(s) IV Intermittent every 12 hours  sodium chloride 3%. 500 milliLiter(s) (30 mL/Hr) IV Continuous <Continuous>        Allergies: No Known Allergies    Review of Systems:  Respiratory: No SOB, no cough  GI: No nausea, vomiting, abdominal pain  Endocrine: no polyuria, polydipsia      PHYSICAL EXAM:  Vital Signs Last 24 Hrs  T(C): 36.8 (27 Jan 2023 14:23), Max: 36.8 (27 Jan 2023 14:23)  T(F): 98.2 (27 Jan 2023 14:23), Max: 98.2 (27 Jan 2023 14:23)  HR: 62 (27 Jan 2023 15:25) (56 - 79)  BP: 151/60 (27 Jan 2023 14:23) (137/56 - 176/76)  BP(mean): --  RR: 18 (27 Jan 2023 14:23) (18 - 18)  SpO2: 98% (27 Jan 2023 15:29) (94% - 100%)    Parameters below as of 27 Jan 2023 15:29  Patient On (Oxygen Delivery Method): room air      GENERAL: NAD==  RESPIRATORY: No labored breathing   GI: Soft, nontender, non distended  PSYCH: Alert and oriented x 3, normal affect, normal mood    CAPILLARY BLOOD GLUCOSE      POCT Blood Glucose.: 185 mg/dL (27 Jan 2023 07:36)  POCT Blood Glucose.: 238 mg/dL (26 Jan 2023 23:34)  POCT Blood Glucose.: 217 mg/dL (26 Jan 2023 22:16)  POCT Blood Glucose.: 235 mg/dL (26 Jan 2023 16:56)      A1C with Estimated Average Glucose (01.17.23 @ 07:10)    A1C with Estimated Average Glucose Result: 6.9    Estimated Average Glucose: 151      01-27    133<L>  |  89<L>  |  48<H>  ----------------------------<  183<H>  4.0   |  33<H>  |  2.00<H>    Ca    9.1      27 Jan 2023 04:43  Phos  3.5     01-27  Mg     1.40     01-27        Thyroid Function Tests:  01-21 @ 04:50 TSH 1.35 FreeT4 -- T3 -- Anti TPO -- Anti Thyroglobulin Ab -- TSI --  01-20 @ 06:04 TSH -- FreeT4 1.6 T3 -- Anti TPO -- Anti Thyroglobulin Ab -- TSI --      Diet, DASH/TLC:   Sodium & Cholesterol Restricted  Consistent Carbohydrate No Snacks (CSTCHO)  1000mL Fluid Restriction ([id number]) (01-20-23 @ 07:52) [Active]

## 2023-01-27 NOTE — DISCHARGE NOTE PROVIDER - NSDCFUADDAPPT_GEN_ALL_CORE_FT
PCP in 1 week  Endocrine as scheduled 3/2  Nephrology in 1 week for Cr monitoring  PCP in 1 week  Endocrine as scheduled 3/2  Nephrology in 1 week for Cr monitoring   Pulmonology in one week

## 2023-01-27 NOTE — DISCHARGE NOTE PROVIDER - NSFOLLOWUPCLINICS_GEN_ALL_ED_FT
[facility]  Endocrinology  [address] [place] [address]  Phone: [phone]  Fax:

## 2023-01-27 NOTE — DISCHARGE NOTE PROVIDER - HOSPITAL COURSE
57F  w/ recent myxedema coma (hospitalized from 12/16/22 to 1/4/23, requiring intubation), hypothyroidism, hypertension, diabetes, hyperlipidemia, CKD, pulmonary hypertension, asthma on albuterol presented to the ED w/ respiratory distress. 57F  w/ recent myxedema coma (hospitalized from 12/16/22 to 1/4/23, requiring intubation), hypothyroidism, hypertension, diabetes, hyperlipidemia, CKD, pulmonary hypertension, asthma on albuterol presented to the ED w/ respiratory distress.    #Hypoxic respiratory failure 2/2 PNA and pulmonary edema, required bipap and was transitioned off of oxygen s/p bumex IV and IV antibiotics. Transitioned to oral bumex on 1/25. Pulmonary edema likely 2/2 diastolic HF. RHC performed to assess etiology for hypoxia/fluid overload and found increased wedge pressure and moderate-severe pulm HTN and elevated CVP. Continuing on bumex 1mg BID and follow up outpatient with pulmonology and cardiology for further testing.     #ISAMAR, pt creatinine rising to >3 and then downtrended. Unclear etiology for ISAMAR. Nephrology following. BUN and bicarb rising which was concerning for contraction alkalosis and bumex dose was reduced. Discussed with nephrology and plan for follow up outpatient in 1 week.      #Hx of myxedema coma, two recent hospitalization for myxedema coma. TSH wnl. Endocrine following. C/w synthroid 125mcg POqd.     #Pancytopenia, anemia requiring blood transfusions but no evidence of blood loss a/w thrombocytopenia. Heme consulted. Think this is 2/2 myelosuppression in the setting of critical illness. Platelet count uptrending. Hgb stable.

## 2023-01-27 NOTE — PROGRESS NOTE ADULT - PROBLEM SELECTOR PLAN 3
Pt. with hyperkalemia in the setting of ISAMAR. Serum potassium WNL at 4.0 today. Low potassium diet. Monitor serum potassium.      If you have any questions, please feel free to contact me  Chuck Harvey  Nephrology Fellow  [phone]/ [employer] Teams(Preferred)  (After 5pm or on weekends please page the on-call fellow).

## 2023-01-28 LAB
ANION GAP SERPL CALC-SCNC: 11 MMOL/L — SIGNIFICANT CHANGE UP (ref 7–14)
BUN SERPL-MCNC: 52 MG/DL — HIGH (ref 7–23)
CALCIUM SERPL-MCNC: 9.5 MG/DL — SIGNIFICANT CHANGE UP (ref 8.4–10.5)
CHLORIDE SERPL-SCNC: 90 MMOL/L — LOW (ref 98–107)
CO2 SERPL-SCNC: 32 MMOL/L — HIGH (ref 22–31)
CREAT SERPL-MCNC: 2.05 MG/DL — HIGH (ref 0.5–1.3)
EGFR: 28 ML/MIN/1.73M2 — LOW
GLUCOSE BLDC GLUCOMTR-MCNC: 170 MG/DL — HIGH (ref 70–99)
GLUCOSE BLDC GLUCOMTR-MCNC: 210 MG/DL — HIGH (ref 70–99)
GLUCOSE BLDC GLUCOMTR-MCNC: 220 MG/DL — HIGH (ref 70–99)
GLUCOSE BLDC GLUCOMTR-MCNC: 339 MG/DL — HIGH (ref 70–99)
GLUCOSE SERPL-MCNC: 152 MG/DL — HIGH (ref 70–99)
HCT VFR BLD CALC: 22.3 % — LOW (ref 34.5–45)
HGB BLD-MCNC: 7.2 G/DL — LOW (ref 11.5–15.5)
MAGNESIUM SERPL-MCNC: 2 MG/DL — SIGNIFICANT CHANGE UP (ref 1.6–2.6)
MCHC RBC-ENTMCNC: 26.4 PG — LOW (ref 27–34)
MCHC RBC-ENTMCNC: 32.3 GM/DL — SIGNIFICANT CHANGE UP (ref 32–36)
MCV RBC AUTO: 81.7 FL — SIGNIFICANT CHANGE UP (ref 80–100)
NRBC # BLD: 0 /100 WBCS — SIGNIFICANT CHANGE UP (ref 0–0)
NRBC # FLD: 0.02 K/UL — HIGH (ref 0–0)
PHOSPHATE SERPL-MCNC: 4 MG/DL — SIGNIFICANT CHANGE UP (ref 2.5–4.5)
PLATELET # BLD AUTO: 61 K/UL — LOW (ref 150–400)
POTASSIUM SERPL-MCNC: 4.1 MMOL/L — SIGNIFICANT CHANGE UP (ref 3.5–5.3)
POTASSIUM SERPL-SCNC: 4.1 MMOL/L — SIGNIFICANT CHANGE UP (ref 3.5–5.3)
RBC # BLD: 2.73 M/UL — LOW (ref 3.8–5.2)
RBC # FLD: 14.9 % — HIGH (ref 10.3–14.5)
SODIUM SERPL-SCNC: 133 MMOL/L — LOW (ref 135–145)
WBC # BLD: 9.49 K/UL — SIGNIFICANT CHANGE UP (ref 3.8–10.5)
WBC # FLD AUTO: 9.49 K/UL — SIGNIFICANT CHANGE UP (ref 3.8–10.5)

## 2023-01-28 PROCEDURE — 99232 SBSQ HOSP IP/OBS MODERATE 35: CPT

## 2023-01-28 RX ADMIN — PIPERACILLIN AND TAZOBACTAM 25 GRAM(S): 4; .5 INJECTION, POWDER, LYOPHILIZED, FOR SOLUTION INTRAVENOUS at 06:07

## 2023-01-28 RX ADMIN — Medication 5 UNIT(S): at 07:51

## 2023-01-28 RX ADMIN — HEPARIN SODIUM 5000 UNIT(S): 5000 INJECTION INTRAVENOUS; SUBCUTANEOUS at 01:31

## 2023-01-28 RX ADMIN — Medication 50 MILLIGRAM(S): at 15:52

## 2023-01-28 RX ADMIN — PANTOPRAZOLE SODIUM 40 MILLIGRAM(S): 20 TABLET, DELAYED RELEASE ORAL at 12:10

## 2023-01-28 RX ADMIN — Medication 50 MILLIGRAM(S): at 21:33

## 2023-01-28 RX ADMIN — Medication 3 MILLILITER(S): at 09:17

## 2023-01-28 RX ADMIN — Medication 125 MICROGRAM(S): at 06:07

## 2023-01-28 RX ADMIN — Medication 2: at 12:01

## 2023-01-28 RX ADMIN — Medication 3 MILLILITER(S): at 03:26

## 2023-01-28 RX ADMIN — Medication 4: at 17:22

## 2023-01-28 RX ADMIN — BUMETANIDE 2 MILLIGRAM(S): 0.25 INJECTION INTRAMUSCULAR; INTRAVENOUS at 17:23

## 2023-01-28 RX ADMIN — Medication 5 UNIT(S): at 17:22

## 2023-01-28 RX ADMIN — Medication 50 MILLIGRAM(S): at 06:05

## 2023-01-28 RX ADMIN — Medication 5 UNIT(S): at 12:02

## 2023-01-28 RX ADMIN — BUMETANIDE 2 MILLIGRAM(S): 0.25 INJECTION INTRAMUSCULAR; INTRAVENOUS at 06:05

## 2023-01-28 RX ADMIN — Medication 1: at 07:50

## 2023-01-28 RX ADMIN — INSULIN GLARGINE 3 UNIT(S): 100 INJECTION, SOLUTION SUBCUTANEOUS at 21:28

## 2023-01-28 RX ADMIN — HEPARIN SODIUM 5000 UNIT(S): 5000 INJECTION INTRAVENOUS; SUBCUTANEOUS at 08:49

## 2023-01-28 RX ADMIN — HEPARIN SODIUM 5000 UNIT(S): 5000 INJECTION INTRAVENOUS; SUBCUTANEOUS at 17:22

## 2023-01-28 RX ADMIN — Medication 50 MILLIGRAM(S): at 08:49

## 2023-01-28 RX ADMIN — Medication 3 MILLILITER(S): at 15:53

## 2023-01-28 RX ADMIN — Medication 50 MILLIGRAM(S): at 20:30

## 2023-01-28 RX ADMIN — Medication 3 MILLILITER(S): at 20:39

## 2023-01-28 NOTE — PROGRESS NOTE ADULT - PROBLEM SELECTOR PLAN 1
Pt. presented with hypothermia and hypoxic respiratory failure. Sepsis 2/2 PNA resolved. Hypoxic respiratory failure was due to fluid overload. S/p IV diuresis. Required BiPAP and now on 4L NC. No longer requiring BiPAP. Desaturates to 91% at rest on 2L. RHC performed showing increased wedge pressure and moderate-severe pulm HTN and elevated CVP. Pulm performed lung u/s this week and shows small-moderate left pleural effusion, not optimal window for therapeutic thoracentesis.    - Transitioned to oral bumex on 1/25, monitoring on oral meds for now   - On RA - ambulatory O2 sat on RA 92%.   - Will need outpatient cardiology and pulm follow up

## 2023-01-28 NOTE — PROVIDER CONTACT NOTE (OTHER) - ASSESSMENT
Pt's oral temp and axillary temp keeps reading as 94.2 upon multiple attempts. Pt stated this is not something new; she said this situation has happened to her multiple times already. Pt requested rectal temp to be taken; rectal temp is 94.4 F.
Pt resting comfortably in bed VSS documented. Pt asymptomatic, has low temperature at this time. Pt denies feeling cold.

## 2023-01-28 NOTE — PROGRESS NOTE ADULT - PROBLEM SELECTOR PLAN 7
Patient with two recent hospitalizations, one at [facility] and one at OSH for myxedema coma   - Endo following, currently no evidence of myxedema   - TSH wnl   - C/w iv synthroid

## 2023-01-28 NOTE — PROGRESS NOTE ADULT - SUBJECTIVE AND OBJECTIVE BOX
Hospitalist Progress Note  Sophie Ferreira MD Pager # [phone]    OVERNIGHT EVENTS: LAURYN    SUBJECTIVE / INTERVAL HPI: Patient seen and examined at bedside. Patient reports feeling well and having no complaints. She says she did not feel SOB when walking in the hallway yesterday. No CP.     VITAL SIGNS:  Vital Signs Last 24 Hrs  T(C): 34.9 (28 Jan 2023 08:45), Max: 36.8 (27 Jan 2023 14:23)  T(F): 94.8 (28 Jan 2023 08:45), Max: 98.2 (27 Jan 2023 14:23)  HR: 62 (28 Jan 2023 09:17) (53 - 70)  BP: 132/68 (28 Jan 2023 08:45) (132/68 - 151/60)  BP(mean): --  RR: 17 (28 Jan 2023 08:45) (17 - 18)  SpO2: 97% (28 Jan 2023 09:17) (96% - 100%)    Parameters below as of 28 Jan 2023 09:17  Patient On (Oxygen Delivery Method): room air        PHYSICAL EXAM:    General: Comfortable appearing female sitting up in bed   HEENT: NC/AT; PERRL, anicteric sclera; MMM  Neck: supple  Cardiovascular: +S1/S2; RRR  Respiratory: CTA B/L; no W/R/R  Gastrointestinal: soft, NT/ND; +BSx4  Extremities: WWP; no edema, clubbing or cyanosis  Vascular: 2+ radial, DP/PT pulses B/L  Neurological: AAOx3; no focal deficits    MEDICATIONS:  MEDICATIONS  (STANDING):  albuterol/ipratropium for Nebulization 3 milliLiter(s) Nebulizer every 6 hours  buMETAnide 2 milliGRAM(s) Oral two times a day  dextrose 5%. 1000 milliLiter(s) (100 mL/Hr) IV Continuous <Continuous>  dextrose 5%. 1000 milliLiter(s) (50 mL/Hr) IV Continuous <Continuous>  dextrose 50% Injectable 25 Gram(s) IV Push once  dextrose 50% Injectable 12.5 Gram(s) IV Push once  dextrose 50% Injectable 25 Gram(s) IV Push once  glucagon  Injectable 1 milliGRAM(s) IntraMuscular once  heparin   Injectable 5000 Unit(s) SubCutaneous every 8 hours  hydrALAZINE 50 milliGRAM(s) Oral every 8 hours  influenza   Vaccine 0.5 milliLiter(s) IntraMuscular once  insulin glargine Injectable (LANTUS) 3 Unit(s) SubCutaneous at bedtime  insulin lispro (ADMELOG) corrective regimen sliding scale   SubCutaneous three times a day before meals  insulin lispro (ADMELOG) corrective regimen sliding scale   SubCutaneous at bedtime  insulin lispro Injectable (ADMELOG) 5 Unit(s) SubCutaneous three times a day before meals  labetalol 50 milliGRAM(s) Oral every 12 hours  levothyroxine 125 MICROGram(s) Oral daily  mupirocin 2% Ointment 1 Application(s) Topical three times a day  pantoprazole  Injectable 40 milliGRAM(s) IV Push daily  sodium chloride 3%. 500 milliLiter(s) (30 mL/Hr) IV Continuous <Continuous>    MEDICATIONS  (PRN):  acetaminophen     Tablet .. 650 milliGRAM(s) Oral every 6 hours PRN Temp greater or equal to 38C (100.4F), Mild Pain (1 - 3)  aluminum hydroxide/magnesium hydroxide/simethicone Suspension 30 milliLiter(s) Oral every 4 hours PRN Dyspepsia  dextrose Oral Gel 15 Gram(s) Oral once PRN Blood Glucose LESS THAN 70 milliGRAM(s)/deciliter  guaiFENesin Oral Liquid (Sugar-Free) 200 milliGRAM(s) Oral every 6 hours PRN Cough  melatonin 3 milliGRAM(s) Oral at bedtime PRN Insomnia  ondansetron Injectable 4 milliGRAM(s) IV Push every 8 hours PRN Nausea and/or Vomiting  sodium chloride 0.65% Nasal 1 Spray(s) Both Nostrils three times a day PRN nose bleed      ALLERGIES:  Allergies    No Known Allergies    Intolerances        LABS:                        7.2    9.49  )-----------( 61       ( 28 Jan 2023 06:10 )             22.3     01-28    133<L>  |  90<L>  |  52<H>  ----------------------------<  152<H>  4.1   |  32<H>  |  2.05<H>    Ca    9.5      28 Jan 2023 06:10  Phos  4.0     01-28  Mg     2.00     01-28          CAPILLARY BLOOD GLUCOSE      POCT Blood Glucose.: 170 mg/dL (28 Jan 2023 07:35)      RADIOLOGY & ADDITIONAL TESTS: Reviewed.    ASSESSMENT:    PLAN:

## 2023-01-28 NOTE — PROGRESS NOTE ADULT - PROBLEM SELECTOR PLAN 5
Creatinine now stable around 2. Is this her new baseline? Will f/u with renal to see if this Cr is acceptable prior to d/c or if we want to see the Cr come down more.   - Renal following, ?cardiorenal syndrome   - C/w bumex as above  - Monitor BMP  - Avoid nephrotoxins   - Is/Os

## 2023-01-28 NOTE — PROGRESS NOTE ADULT - PROBLEM SELECTOR PLAN 3
Thoracic wound stable, debridement & Ag Nitrate used per MD, cont. With current wound care regime. Left ischial wound showing improvement, debridement per MD, cont. With AgAlginate to wound as ordered. Right Ischial wound cont. To have >15cm tunnel present, debridement of wound, 1/4\" Iodoform placed into tunnel per MD, otherwise cont. With current wound care regime. Pt. States that he has not been out of bed all week. 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 Right lateral BKA stump wound stable, cont. With NPWT as ordered. Knee wounds stable, cont. With Hydrogel & dry dressing as previously ordered. Left heel with several areas of bone exposure this week, bone debridement & culture obtained per MD. Dr Manjinder Parson advised pt. He may need PO &/or IV antibiotics pending culture results. Dr Manjinder Parson will be in touch with pt. When culture results obtained. Will otherwise cont. With current wound are regime with TCC for off-loading of wound. F/u in [facility] in 1 week as ordered. Discharge instructions reviewed with patient, all questions answered, copy given to patient. Dressings were applied to all wounds per M.D. Instructions at this visit. daily lokelma started but now holding as per renal. K normalized.   appreciate nephrology input

## 2023-01-28 NOTE — PROVIDER CONTACT NOTE (OTHER) - SITUATION
Temp 90.4
Pt's oral temp and axillary temp was attempted to be taken multiple times. Oral temp and axillary temp shows 94.2 F. Rectal temp is 94.4 F.

## 2023-01-29 LAB
ANION GAP SERPL CALC-SCNC: 10 MMOL/L — SIGNIFICANT CHANGE UP (ref 7–14)
BUN SERPL-MCNC: 60 MG/DL — HIGH (ref 7–23)
CALCIUM SERPL-MCNC: 9.3 MG/DL — SIGNIFICANT CHANGE UP (ref 8.4–10.5)
CHLORIDE SERPL-SCNC: 89 MMOL/L — LOW (ref 98–107)
CO2 SERPL-SCNC: 33 MMOL/L — HIGH (ref 22–31)
CREAT SERPL-MCNC: 2.08 MG/DL — HIGH (ref 0.5–1.3)
EGFR: 27 ML/MIN/1.73M2 — LOW
GLUCOSE BLDC GLUCOMTR-MCNC: 191 MG/DL — HIGH (ref 70–99)
GLUCOSE BLDC GLUCOMTR-MCNC: 238 MG/DL — HIGH (ref 70–99)
GLUCOSE BLDC GLUCOMTR-MCNC: 281 MG/DL — HIGH (ref 70–99)
GLUCOSE BLDC GLUCOMTR-MCNC: 78 MG/DL — SIGNIFICANT CHANGE UP (ref 70–99)
GLUCOSE BLDC GLUCOMTR-MCNC: >600 MG/DL — CRITICAL HIGH (ref 70–99)
GLUCOSE SERPL-MCNC: 168 MG/DL — HIGH (ref 70–99)
HCT VFR BLD CALC: 22.6 % — LOW (ref 34.5–45)
HGB BLD-MCNC: 7.2 G/DL — LOW (ref 11.5–15.5)
MAGNESIUM SERPL-MCNC: 1.8 MG/DL — SIGNIFICANT CHANGE UP (ref 1.6–2.6)
MCHC RBC-ENTMCNC: 26.2 PG — LOW (ref 27–34)
MCHC RBC-ENTMCNC: 31.9 GM/DL — LOW (ref 32–36)
MCV RBC AUTO: 82.2 FL — SIGNIFICANT CHANGE UP (ref 80–100)
NRBC # BLD: 0 /100 WBCS — SIGNIFICANT CHANGE UP (ref 0–0)
NRBC # FLD: 0.02 K/UL — HIGH (ref 0–0)
PHOSPHATE SERPL-MCNC: 4.6 MG/DL — HIGH (ref 2.5–4.5)
PLATELET # BLD AUTO: 85 K/UL — LOW (ref 150–400)
POTASSIUM SERPL-MCNC: 4.7 MMOL/L — SIGNIFICANT CHANGE UP (ref 3.5–5.3)
POTASSIUM SERPL-SCNC: 4.7 MMOL/L — SIGNIFICANT CHANGE UP (ref 3.5–5.3)
RBC # BLD: 2.75 M/UL — LOW (ref 3.8–5.2)
RBC # FLD: 15.2 % — HIGH (ref 10.3–14.5)
SODIUM SERPL-SCNC: 132 MMOL/L — LOW (ref 135–145)
WBC # BLD: 9.6 K/UL — SIGNIFICANT CHANGE UP (ref 3.8–10.5)
WBC # FLD AUTO: 9.6 K/UL — SIGNIFICANT CHANGE UP (ref 3.8–10.5)

## 2023-01-29 PROCEDURE — 99232 SBSQ HOSP IP/OBS MODERATE 35: CPT | Mod: GC

## 2023-01-29 PROCEDURE — 99232 SBSQ HOSP IP/OBS MODERATE 35: CPT

## 2023-01-29 RX ORDER — INSULIN LISPRO 100/ML
6 VIAL (ML) SUBCUTANEOUS
Refills: 0 | Status: DISCONTINUED | OUTPATIENT
Start: 2023-01-29 | End: 2023-01-31

## 2023-01-29 RX ORDER — BUMETANIDE 0.25 MG/ML
1 INJECTION INTRAMUSCULAR; INTRAVENOUS
Refills: 0 | Status: DISCONTINUED | OUTPATIENT
Start: 2023-01-30 | End: 2023-01-31

## 2023-01-29 RX ADMIN — Medication 50 MILLIGRAM(S): at 05:14

## 2023-01-29 RX ADMIN — Medication 1: at 21:59

## 2023-01-29 RX ADMIN — Medication 3 MILLILITER(S): at 16:06

## 2023-01-29 RX ADMIN — PANTOPRAZOLE SODIUM 40 MILLIGRAM(S): 20 TABLET, DELAYED RELEASE ORAL at 12:51

## 2023-01-29 RX ADMIN — HEPARIN SODIUM 5000 UNIT(S): 5000 INJECTION INTRAVENOUS; SUBCUTANEOUS at 16:58

## 2023-01-29 RX ADMIN — Medication 3 MILLILITER(S): at 09:35

## 2023-01-29 RX ADMIN — Medication 5 UNIT(S): at 12:52

## 2023-01-29 RX ADMIN — Medication 50 MILLIGRAM(S): at 12:52

## 2023-01-29 RX ADMIN — Medication 1: at 08:38

## 2023-01-29 RX ADMIN — Medication 3 MILLILITER(S): at 03:50

## 2023-01-29 RX ADMIN — INSULIN GLARGINE 3 UNIT(S): 100 INJECTION, SOLUTION SUBCUTANEOUS at 21:58

## 2023-01-29 RX ADMIN — Medication 3 MILLILITER(S): at 19:51

## 2023-01-29 RX ADMIN — Medication 50 MILLIGRAM(S): at 22:00

## 2023-01-29 RX ADMIN — Medication 50 MILLIGRAM(S): at 08:54

## 2023-01-29 RX ADMIN — Medication 5 UNIT(S): at 08:40

## 2023-01-29 RX ADMIN — Medication 2: at 12:52

## 2023-01-29 RX ADMIN — BUMETANIDE 2 MILLIGRAM(S): 0.25 INJECTION INTRAMUSCULAR; INTRAVENOUS at 05:13

## 2023-01-29 RX ADMIN — HEPARIN SODIUM 5000 UNIT(S): 5000 INJECTION INTRAVENOUS; SUBCUTANEOUS at 00:41

## 2023-01-29 RX ADMIN — Medication 125 MICROGRAM(S): at 05:13

## 2023-01-29 RX ADMIN — HEPARIN SODIUM 5000 UNIT(S): 5000 INJECTION INTRAVENOUS; SUBCUTANEOUS at 08:55

## 2023-01-29 NOTE — PROGRESS NOTE ADULT - SUBJECTIVE AND OBJECTIVE BOX
Hospitalist Progress Note  Sophie Ferreira MD Pager # [phone]    OVERNIGHT EVENTS: LAURYN    SUBJECTIVE / INTERVAL HPI: Patient seen and examined at bedside. Patient reports that she has no SOB or chest pain. She feels well.     VITAL SIGNS:  Vital Signs Last 24 Hrs  T(C): 36.3 (29 Jan 2023 12:32), Max: 37 (29 Jan 2023 05:07)  T(F): 97.3 (29 Jan 2023 12:32), Max: 98.6 (29 Jan 2023 05:07)  HR: 57 (29 Jan 2023 12:32) (57 - 68)  BP: 121/49 (29 Jan 2023 12:32) (111/58 - 147/54)  BP(mean): --  RR: 17 (29 Jan 2023 12:32) (17 - 17)  SpO2: 100% (29 Jan 2023 12:32) (97% - 100%)    Parameters below as of 29 Jan 2023 09:30  Patient On (Oxygen Delivery Method): room air        PHYSICAL EXAM:    General: Thin appearing female resting in bed   HEENT: NC/AT; PERRL, anicteric sclera; MMM  Neck: supple  Cardiovascular: +S1/S2; RRR  Respiratory: CTA B/L; no W/R/R  Gastrointestinal: soft, NT/ND; +BSx4  Extremities: WWP; no edema, clubbing or cyanosis  Vascular: 2+ radial, DP/PT pulses B/L  Neurological: AAOx3; no focal deficits    MEDICATIONS:  MEDICATIONS  (STANDING):  albuterol/ipratropium for Nebulization 3 milliLiter(s) Nebulizer every 6 hours  buMETAnide 2 milliGRAM(s) Oral two times a day  dextrose 5%. 1000 milliLiter(s) (100 mL/Hr) IV Continuous <Continuous>  dextrose 5%. 1000 milliLiter(s) (50 mL/Hr) IV Continuous <Continuous>  dextrose 50% Injectable 25 Gram(s) IV Push once  dextrose 50% Injectable 12.5 Gram(s) IV Push once  dextrose 50% Injectable 25 Gram(s) IV Push once  glucagon  Injectable 1 milliGRAM(s) IntraMuscular once  heparin   Injectable 5000 Unit(s) SubCutaneous every 8 hours  hydrALAZINE 50 milliGRAM(s) Oral every 8 hours  influenza   Vaccine 0.5 milliLiter(s) IntraMuscular once  insulin glargine Injectable (LANTUS) 3 Unit(s) SubCutaneous at bedtime  insulin lispro (ADMELOG) corrective regimen sliding scale   SubCutaneous three times a day before meals  insulin lispro (ADMELOG) corrective regimen sliding scale   SubCutaneous at bedtime  insulin lispro Injectable (ADMELOG) 5 Unit(s) SubCutaneous three times a day before meals  labetalol 50 milliGRAM(s) Oral every 12 hours  levothyroxine 125 MICROGram(s) Oral daily  mupirocin 2% Ointment 1 Application(s) Topical three times a day  pantoprazole  Injectable 40 milliGRAM(s) IV Push daily  sodium chloride 3%. 500 milliLiter(s) (30 mL/Hr) IV Continuous <Continuous>    MEDICATIONS  (PRN):  acetaminophen     Tablet .. 650 milliGRAM(s) Oral every 6 hours PRN Temp greater or equal to 38C (100.4F), Mild Pain (1 - 3)  aluminum hydroxide/magnesium hydroxide/simethicone Suspension 30 milliLiter(s) Oral every 4 hours PRN Dyspepsia  dextrose Oral Gel 15 Gram(s) Oral once PRN Blood Glucose LESS THAN 70 milliGRAM(s)/deciliter  guaiFENesin Oral Liquid (Sugar-Free) 200 milliGRAM(s) Oral every 6 hours PRN Cough  melatonin 3 milliGRAM(s) Oral at bedtime PRN Insomnia  ondansetron Injectable 4 milliGRAM(s) IV Push every 8 hours PRN Nausea and/or Vomiting  sodium chloride 0.65% Nasal 1 Spray(s) Both Nostrils three times a day PRN nose bleed      ALLERGIES:  Allergies    No Known Allergies    Intolerances        LABS:                        7.2    9.60  )-----------( 85       ( 29 Jan 2023 07:17 )             22.6     01-29    132<L>  |  89<L>  |  60<H>  ----------------------------<  168<H>  4.7   |  33<H>  |  2.08<H>    Ca    9.3      29 Jan 2023 07:17  Phos  4.6     01-29  Mg     1.80     01-29          CAPILLARY BLOOD GLUCOSE      POCT Blood Glucose.: 238 mg/dL (29 Jan 2023 11:55)      RADIOLOGY & ADDITIONAL TESTS: Reviewed.    ASSESSMENT:    PLAN:

## 2023-01-29 NOTE — PROGRESS NOTE ADULT - PROBLEM SELECTOR PLAN 1
Pt. with ISAMAR on CKD in the setting of infection and recent IV contrast use. CT scan with IV contrast was done on 1/17. Scr was elevated at 1.52 on 1/18. Scr is elevated/improved at 2 today (1/27/23). UOP ~1.6 L in last 24 hrs. Spot urine TP/CR ratio was significantly elevated at 5.9. C3 and C4 were not low. Testing for HIV was negative. STEVO, HBsAg and Hep C Ab were nonreactive/negative, negative ANCA levels and serum VEE. Pt currently on PO Bumex 2 mg BID. Pt clinically improved. Consider decreasing to PO Bumex 1 mg BID. Cardiology note from 1/25/23 reviewed. Monitor labs and urine output. Avoid any potential nephrotoxins. Dose medications as per eGFR.

## 2023-01-29 NOTE — PROGRESS NOTE ADULT - PROBLEM SELECTOR PLAN 5
Creatinine now stable around 2 however BUN is rising. Discussed with nephrology and thinks possibly we may have overdiuresed given her bicarb is rising as well.  - Renal following, ?cardiorenal syndrome   - F/u recommendations for bumex - likely will need to reduce dose   - Monitor BMP  - Avoid nephrotoxins   - Is/Os

## 2023-01-29 NOTE — PROGRESS NOTE ADULT - SUBJECTIVE AND OBJECTIVE BOX
[facility] DIVISION OF KIDNEY DISEASES AND HYPERTENSION   FOLLOW UP NOTE    --------------------------------------------------------------------------------  Chief Complaint:    24 hour events/subjective: Pt. was seen and examined today. Pt says she is feeling better and requesting to go home. Denies fever, chills, nausea, vomiting, CP and SOB.  Scr elevated/stable at 2.08 with BUN of 60.       PAST HISTORY  --------------------------------------------------------------------------------  No significant changes to PMH, PSH, FHx, SHx, unless otherwise noted    ALLERGIES & MEDICATIONS  --------------------------------------------------------------------------------  Allergies    No Known Allergies    Intolerances    Standing Inpatient Medications  albuterol/ipratropium for Nebulization 3 milliLiter(s) Nebulizer every 6 hours  buMETAnide 2 milliGRAM(s) Oral two times a day  dextrose 5%. 1000 milliLiter(s) IV Continuous <Continuous>  dextrose 5%. 1000 milliLiter(s) IV Continuous <Continuous>  dextrose 50% Injectable 25 Gram(s) IV Push once  dextrose 50% Injectable 12.5 Gram(s) IV Push once  dextrose 50% Injectable 25 Gram(s) IV Push once  glucagon  Injectable 1 milliGRAM(s) IntraMuscular once  heparin   Injectable 5000 Unit(s) SubCutaneous every 8 hours  hydrALAZINE 50 milliGRAM(s) Oral every 8 hours  influenza   Vaccine 0.5 milliLiter(s) IntraMuscular once  insulin glargine Injectable (LANTUS) 3 Unit(s) SubCutaneous at bedtime  insulin lispro (ADMELOG) corrective regimen sliding scale   SubCutaneous three times a day before meals  insulin lispro (ADMELOG) corrective regimen sliding scale   SubCutaneous at bedtime  insulin lispro Injectable (ADMELOG) 5 Unit(s) SubCutaneous three times a day before meals  labetalol 50 milliGRAM(s) Oral every 12 hours  levothyroxine 125 MICROGram(s) Oral daily  mupirocin 2% Ointment 1 Application(s) Topical three times a day  pantoprazole  Injectable 40 milliGRAM(s) IV Push daily  sodium chloride 3%. 500 milliLiter(s) IV Continuous <Continuous>    PRN Inpatient Medications  acetaminophen     Tablet .. 650 milliGRAM(s) Oral every 6 hours PRN  aluminum hydroxide/magnesium hydroxide/simethicone Suspension 30 milliLiter(s) Oral every 4 hours PRN  dextrose Oral Gel 15 Gram(s) Oral once PRN  guaiFENesin Oral Liquid (Sugar-Free) 200 milliGRAM(s) Oral every 6 hours PRN  melatonin 3 milliGRAM(s) Oral at bedtime PRN  ondansetron Injectable 4 milliGRAM(s) IV Push every 8 hours PRN  sodium chloride 0.65% Nasal 1 Spray(s) Both Nostrils three times a day PRN      REVIEW OF SYSTEMS  --------------------------------------------------------------------------------  Gen: No fevers/chills  Head/Eyes/Ears: No HA   Respiratory: No dyspnea, cough  CV: No chest pain  GI: No abdominal pain, diarrhea  : No dysuria, hematuria  MSK: No  edema  Skin: No rashes    VITALS/PHYSICAL EXAM  --------------------------------------------------------------------------------  LALO): 36.3 (01-29-23 @ 12:32), Max: 37 (01-29-23 @ 05:07)  HR: 57 (01-29-23 @ 12:32) (57 - 68)  BP: 121/49 (01-29-23 @ 12:32) (111/58 - 147/54)  RR: 17 (01-29-23 @ 12:32) (17 - 17)  SpO2: 100% (01-29-23 @ 12:32) (97% - 100%)  Wt(kg): --    01-28-23 @ 07:01 - 01-29-23 @ 07:00  --------------------------------------------------------  IN: 1330 mL / OUT: 0 mL / NET: 1330 mL    Physical Exam:  	Gen: NAD  	HEENT: Anicteric  	Pulm: CTA B/L  	CV: S1S2+  	Abd: Soft, +BS   	Ext: No LE edema B/L  	Neuro: Awake      	Skin: Warm and dry    LABS/STUDIES  --------------------------------------------------------------------------------              7.2    9.60  >-----------<  85       [01-29-23 @ 07:17]              22.6     132  |  89  |  60  ----------------------------<  168      [01-29-23 @ 07:17]  4.7   |  33  |  2.08        Ca     9.3     [01-29-23 @ 07:17]      Mg     1.80     [01-29-23 @ 07:17]      Phos  4.6     [01-29-23 @ 07:17]    Creatinine Trend:  SCr 2.08 [01-29 @ 07:17]  SCr 2.05 [01-28 @ 06:10]  SCr 2.00 [01-27 @ 04:43]  SCr 2.30 [01-26 @ 06:55]  SCr 2.64 [01-25 @ 04:45]    Urinalysis - [01-18-23 @ 05:58]      Color Colorless / Appearance Clear / SG 1.011 / pH 6.0      Gluc Trace / Ketone Negative  / Bili Negative / Urobili <2 mg/dL       Blood Trace / Protein 30 mg/dL / Leuk Est Negative / Nitrite Negative      RBC 4 / WBC 3 / Hyaline 1 / Gran  / Sq Epi  / Non Sq Epi 3 / Bacteria Negative      HBsAg Nonreact      [01-20-23 @ 19:51]  HCV 0.09, Nonreact      [01-20-23 @ 19:51]  HIV Nonreact      [01-20-23 @ 19:51]    STEVO: titer Negative, pattern --      [01-19-23 @ 05:02]  dsDNA <12      [01-19-23 @ 05:02]  C3 Complement 128      [01-19-23 @ 05:02]  C4 Complement 46      [01-19-23 @ 05:02]  Rheumatoid Factor 10      [01-19-23 @ 05:02]  ANCA: cANCA Negative, pANCA Negative, atypical ANCA Indeterminate Method interference due to STEVO Fluorescence      [01-19-23 @ 05:02]  anti-GBM <0.2      [01-19-23 @ 05:02]  Free Light Chains: kappa 8.92, lambda 3.79, ratio = 2.35      [01-19 @ 05:02]  Immunofixation Serum:   No Monoclonal Band Identified. HUMPHREY Mccrary MD    Reference Range: None Detected      [01-19-23 @ 05:02]  SPEP Interpretation: Increased Beta fraction, possibly transferrin increase. HUMPHREY Mccrary MD      [01-19-23 @ 05:02]  Immunofixation Urine:   Reference Range: None Detected      [01-19-23 @ 08:10]

## 2023-01-29 NOTE — PROGRESS NOTE ADULT - PROBLEM SELECTOR PLAN 2
Pt. with hyponatremia in the setting of fluid overload. SNa low/improved to 132 today. Recommend to continue with diuretic therapy, as above. Restrict fluid intake <1L per day. Monitor SNa daily.

## 2023-01-29 NOTE — CHART NOTE - NSCHARTNOTEFT_GEN_A_CORE
Chart reviewed   See last progress note for complete plan of care including discharge planning   Hyperglycemia noted, will increase pre-meal Admelog to 6 units (cautionly increasing with renal function)  Continue with Lantus and Admelog correctional scales as ordered below   Endocrine      CAPILLARY BLOOD GLUCOSE      POCT Blood Glucose.: 238 mg/dL (29 Jan 2023 11:55)  POCT Blood Glucose.: 191 mg/dL (29 Jan 2023 07:53)  POCT Blood Glucose.: 210 mg/dL (28 Jan 2023 20:36)  POCT Blood Glucose.: 339 mg/dL (28 Jan 2023 17:07)      01-29    132<L>  |  89<L>  |  60<H>  ----------------------------<  168<H>  4.7   |  33<H>  |  2.08<H>    Ca    9.3      29 Jan 2023 07:17  Phos  4.6     01-29  Mg     1.80     01-29        MEDICATIONS  (STANDING):  albuterol/ipratropium for Nebulization 3 milliLiter(s) Nebulizer every 6 hours  buMETAnide 2 milliGRAM(s) Oral two times a day  dextrose 5%. 1000 milliLiter(s) (100 mL/Hr) IV Continuous <Continuous>  dextrose 5%. 1000 milliLiter(s) (50 mL/Hr) IV Continuous <Continuous>  dextrose 50% Injectable 25 Gram(s) IV Push once  dextrose 50% Injectable 12.5 Gram(s) IV Push once  dextrose 50% Injectable 25 Gram(s) IV Push once  glucagon  Injectable 1 milliGRAM(s) IntraMuscular once  heparin   Injectable 5000 Unit(s) SubCutaneous every 8 hours  hydrALAZINE 50 milliGRAM(s) Oral every 8 hours  influenza   Vaccine 0.5 milliLiter(s) IntraMuscular once  insulin glargine Injectable (LANTUS) 3 Unit(s) SubCutaneous at bedtime  insulin lispro (ADMELOG) corrective regimen sliding scale   SubCutaneous three times a day before meals  insulin lispro (ADMELOG) corrective regimen sliding scale   SubCutaneous at bedtime  insulin lispro Injectable (ADMELOG) 5 Unit(s) SubCutaneous three times a day before meals  labetalol 50 milliGRAM(s) Oral every 12 hours  levothyroxine 125 MICROGram(s) Oral daily  mupirocin 2% Ointment 1 Application(s) Topical three times a day  pantoprazole  Injectable 40 milliGRAM(s) IV Push daily  sodium chloride 3%. 500 milliLiter(s) (30 mL/Hr) IV Continuous <Continuous>      Diet, DASH/TLC:   Sodium & Cholesterol Restricted  Consistent Carbohydrate {No Snacks} (CSTCHO)  1000mL Fluid Restriction ([id number]) (01-20-23 @ 07:52)      A1C with Estimated Average Glucose Result: 6.9 % (01-17-23 @ 07:10)  A1C with Estimated Average Glucose Result: 9.7 % (11-25-22 @ 14:20)  A1C with Estimated Average Glucose Result: 9.1 % (09-29-22 @ 06:00)  A1C with Estimated Average Glucose Result: 9.2 % (09-28-22 @ 07:47)

## 2023-01-29 NOTE — PROGRESS NOTE ADULT - PROBLEM SELECTOR PLAN 1
Pt. presented with hypothermia and hypoxic respiratory failure. Sepsis 2/2 PNA resolved. Hypoxic respiratory failure was due to fluid overload. S/p IV diuresis. Required BiPAP and now on 4L NC. No longer requiring BiPAP. RHC performed showing increased wedge pressure and moderate-severe pulm HTN and elevated CVP. Pulm performed lung u/s this week and shows small-moderate left pleural effusion, not optimal window for therapeutic thoracentesis.   - Transitioned to oral bumex on 1/25, monitoring on oral meds for now   - On RA - ambulatory O2 sat on RA 92%.   - Will need outpatient cardiology and pulm follow up

## 2023-01-29 NOTE — PROGRESS NOTE ADULT - PROBLEM SELECTOR PLAN 7
Patient with two recent hospitalizations, one at [facility] and one at OSH for myxedema coma   - Endo following, currently no evidence of myxedema   - TSH wnl   - C/w iv synthroid

## 2023-01-30 LAB
ANION GAP SERPL CALC-SCNC: 11 MMOL/L — SIGNIFICANT CHANGE UP (ref 7–14)
ANION GAP SERPL CALC-SCNC: 13 MMOL/L — SIGNIFICANT CHANGE UP (ref 7–14)
BLD GP AB SCN SERPL QL: NEGATIVE — SIGNIFICANT CHANGE UP
BUN SERPL-MCNC: 66 MG/DL — HIGH (ref 7–23)
BUN SERPL-MCNC: 69 MG/DL — HIGH (ref 7–23)
CALCIUM SERPL-MCNC: 9.2 MG/DL — SIGNIFICANT CHANGE UP (ref 8.4–10.5)
CALCIUM SERPL-MCNC: 9.5 MG/DL — SIGNIFICANT CHANGE UP (ref 8.4–10.5)
CHLORIDE SERPL-SCNC: 88 MMOL/L — LOW (ref 98–107)
CHLORIDE SERPL-SCNC: 91 MMOL/L — LOW (ref 98–107)
CO2 SERPL-SCNC: 26 MMOL/L — SIGNIFICANT CHANGE UP (ref 22–31)
CO2 SERPL-SCNC: 29 MMOL/L — SIGNIFICANT CHANGE UP (ref 22–31)
CREAT SERPL-MCNC: 2.17 MG/DL — HIGH (ref 0.5–1.3)
CREAT SERPL-MCNC: 2.32 MG/DL — HIGH (ref 0.5–1.3)
EGFR: 24 ML/MIN/1.73M2 — LOW
EGFR: 26 ML/MIN/1.73M2 — LOW
GLUCOSE BLDC GLUCOMTR-MCNC: 144 MG/DL — HIGH (ref 70–99)
GLUCOSE BLDC GLUCOMTR-MCNC: 172 MG/DL — HIGH (ref 70–99)
GLUCOSE BLDC GLUCOMTR-MCNC: 175 MG/DL — HIGH (ref 70–99)
GLUCOSE BLDC GLUCOMTR-MCNC: 241 MG/DL — HIGH (ref 70–99)
GLUCOSE SERPL-MCNC: 178 MG/DL — HIGH (ref 70–99)
GLUCOSE SERPL-MCNC: 238 MG/DL — HIGH (ref 70–99)
HCT VFR BLD CALC: 20.6 % — CRITICAL LOW (ref 34.5–45)
HCT VFR BLD CALC: 21.8 % — LOW (ref 34.5–45)
HCT VFR BLD CALC: 25.3 % — LOW (ref 34.5–45)
HGB BLD-MCNC: 6.7 G/DL — CRITICAL LOW (ref 11.5–15.5)
HGB BLD-MCNC: 6.9 G/DL — CRITICAL LOW (ref 11.5–15.5)
HGB BLD-MCNC: 8.1 G/DL — LOW (ref 11.5–15.5)
MAGNESIUM SERPL-MCNC: 1.8 MG/DL — SIGNIFICANT CHANGE UP (ref 1.6–2.6)
MAGNESIUM SERPL-MCNC: 1.8 MG/DL — SIGNIFICANT CHANGE UP (ref 1.6–2.6)
MCHC RBC-ENTMCNC: 25.9 PG — LOW (ref 27–34)
MCHC RBC-ENTMCNC: 25.9 PG — LOW (ref 27–34)
MCHC RBC-ENTMCNC: 26.2 PG — LOW (ref 27–34)
MCHC RBC-ENTMCNC: 31.7 GM/DL — LOW (ref 32–36)
MCHC RBC-ENTMCNC: 32 GM/DL — SIGNIFICANT CHANGE UP (ref 32–36)
MCHC RBC-ENTMCNC: 32.5 GM/DL — SIGNIFICANT CHANGE UP (ref 32–36)
MCV RBC AUTO: 80.5 FL — SIGNIFICANT CHANGE UP (ref 80–100)
MCV RBC AUTO: 80.8 FL — SIGNIFICANT CHANGE UP (ref 80–100)
MCV RBC AUTO: 82 FL — SIGNIFICANT CHANGE UP (ref 80–100)
NRBC # BLD: 0 /100 WBCS — SIGNIFICANT CHANGE UP (ref 0–0)
NRBC # FLD: 0 K/UL — SIGNIFICANT CHANGE UP (ref 0–0)
NRBC # FLD: 0 K/UL — SIGNIFICANT CHANGE UP (ref 0–0)
NRBC # FLD: 0.02 K/UL — HIGH (ref 0–0)
PHOSPHATE SERPL-MCNC: 5.2 MG/DL — HIGH (ref 2.5–4.5)
PHOSPHATE SERPL-MCNC: 5.4 MG/DL — HIGH (ref 2.5–4.5)
PLATELET # BLD AUTO: 101 K/UL — LOW (ref 150–400)
PLATELET # BLD AUTO: 113 K/UL — LOW (ref 150–400)
PLATELET # BLD AUTO: 125 K/UL — LOW (ref 150–400)
POTASSIUM SERPL-MCNC: 4.6 MMOL/L — SIGNIFICANT CHANGE UP (ref 3.5–5.3)
POTASSIUM SERPL-MCNC: 5.6 MMOL/L — HIGH (ref 3.5–5.3)
POTASSIUM SERPL-SCNC: 4.6 MMOL/L — SIGNIFICANT CHANGE UP (ref 3.5–5.3)
POTASSIUM SERPL-SCNC: 5.6 MMOL/L — HIGH (ref 3.5–5.3)
RBC # BLD: 2.56 M/UL — LOW (ref 3.8–5.2)
RBC # BLD: 2.66 M/UL — LOW (ref 3.8–5.2)
RBC # BLD: 3.13 M/UL — LOW (ref 3.8–5.2)
RBC # FLD: 14.8 % — HIGH (ref 10.3–14.5)
RBC # FLD: 15 % — HIGH (ref 10.3–14.5)
RBC # FLD: 15.4 % — HIGH (ref 10.3–14.5)
RH IG SCN BLD-IMP: POSITIVE — SIGNIFICANT CHANGE UP
SODIUM SERPL-SCNC: 128 MMOL/L — LOW (ref 135–145)
SODIUM SERPL-SCNC: 130 MMOL/L — LOW (ref 135–145)
WBC # BLD: 7.46 K/UL — SIGNIFICANT CHANGE UP (ref 3.8–10.5)
WBC # BLD: 8.23 K/UL — SIGNIFICANT CHANGE UP (ref 3.8–10.5)
WBC # BLD: 9.91 K/UL — SIGNIFICANT CHANGE UP (ref 3.8–10.5)
WBC # FLD AUTO: 7.46 K/UL — SIGNIFICANT CHANGE UP (ref 3.8–10.5)
WBC # FLD AUTO: 8.23 K/UL — SIGNIFICANT CHANGE UP (ref 3.8–10.5)
WBC # FLD AUTO: 9.91 K/UL — SIGNIFICANT CHANGE UP (ref 3.8–10.5)

## 2023-01-30 PROCEDURE — 99232 SBSQ HOSP IP/OBS MODERATE 35: CPT

## 2023-01-30 RX ORDER — SODIUM ZIRCONIUM CYCLOSILICATE 10 G/10G
10 POWDER, FOR SUSPENSION ORAL ONCE
Refills: 0 | Status: COMPLETED | OUTPATIENT
Start: 2023-01-30 | End: 2023-01-30

## 2023-01-30 RX ADMIN — Medication 50 MILLIGRAM(S): at 05:16

## 2023-01-30 RX ADMIN — HEPARIN SODIUM 5000 UNIT(S): 5000 INJECTION INTRAVENOUS; SUBCUTANEOUS at 09:05

## 2023-01-30 RX ADMIN — Medication 3 MILLILITER(S): at 15:31

## 2023-01-30 RX ADMIN — BUMETANIDE 1 MILLIGRAM(S): 0.25 INJECTION INTRAMUSCULAR; INTRAVENOUS at 05:17

## 2023-01-30 RX ADMIN — Medication 3 MILLILITER(S): at 19:43

## 2023-01-30 RX ADMIN — HEPARIN SODIUM 5000 UNIT(S): 5000 INJECTION INTRAVENOUS; SUBCUTANEOUS at 01:07

## 2023-01-30 RX ADMIN — Medication 50 MILLIGRAM(S): at 15:20

## 2023-01-30 RX ADMIN — Medication 3 MILLILITER(S): at 10:20

## 2023-01-30 RX ADMIN — Medication 50 MILLIGRAM(S): at 21:26

## 2023-01-30 RX ADMIN — BUMETANIDE 1 MILLIGRAM(S): 0.25 INJECTION INTRAMUSCULAR; INTRAVENOUS at 15:20

## 2023-01-30 RX ADMIN — Medication 1: at 17:33

## 2023-01-30 RX ADMIN — Medication 6 UNIT(S): at 11:45

## 2023-01-30 RX ADMIN — PANTOPRAZOLE SODIUM 40 MILLIGRAM(S): 20 TABLET, DELAYED RELEASE ORAL at 11:47

## 2023-01-30 RX ADMIN — INSULIN GLARGINE 3 UNIT(S): 100 INJECTION, SOLUTION SUBCUTANEOUS at 21:26

## 2023-01-30 RX ADMIN — Medication 6 UNIT(S): at 17:33

## 2023-01-30 RX ADMIN — Medication 125 MICROGRAM(S): at 05:22

## 2023-01-30 RX ADMIN — Medication 50 MILLIGRAM(S): at 05:17

## 2023-01-30 RX ADMIN — Medication 6 UNIT(S): at 08:27

## 2023-01-30 RX ADMIN — Medication 50 MILLIGRAM(S): at 17:34

## 2023-01-30 RX ADMIN — Medication 2: at 08:26

## 2023-01-30 RX ADMIN — HEPARIN SODIUM 5000 UNIT(S): 5000 INJECTION INTRAVENOUS; SUBCUTANEOUS at 17:34

## 2023-01-30 NOTE — PROVIDER CONTACT NOTE (CRITICAL VALUE NOTIFICATION) - BACKGROUND
57 year old female admitted with SOB. pmhx hypothyroidism, hypertension, DM
Pmhx of HTN, DM, CKD, presenting to the ED with respiratory distress.
57 year old women came in to the ED with respiratory distress
57 year old female admitted with SOB. pmhx hypothyroidism, hypertension, DM
57 year old women came in to the ED with respiratory distress

## 2023-01-30 NOTE — PROGRESS NOTE ADULT - PROBLEM SELECTOR PLAN 9
Patient is on labetalol 50mg BID and hydralazine 50mg TID at home. BPs in 170s today.   - Will add back on labetalol 50mg BID and assess BP   - Will likely need to add on hydralazine tomorrow.
Patient is on labetalol 50mg BID and hydralazine 50mg TID at home. BPs in 170s today.   - Will add back on labetalol 50mg BID and assess BP   - Will likely need to add on hydralazine tomorrow.
Holding BP meds in setting of possible sepsis, reassess and add back as indicated
Patient is on labetalol 50mg BID and hydralazine 50mg TID at home. BPs in 170s today.   - Will add back on labetalol 50mg BID and assess BP   - Will likely need to add on hydralazine tomorrow.
Patient is on labetalol 50mg BID and hydralazine 50mg TID at home. BPs in 170s today.   - Will add back on labetalol 50mg BID and assess BP   - Will likely need to add on hydralazine tomorrow.
Holding BP meds in setting of possible sepsis, reassess and add back as indicated
Patient is on labetalol 50mg BID and hydralazine 50mg TID at home. BPs in 170s today.   - Will add back on labetalol 50mg BID and assess BP   - Will likely need to add on hydralazine tomorrow.
Holding BP meds in setting of possible sepsis, reassess and add back as indicated
Holding BP meds in setting of possible sepsis, reassess and add back as indicated
AG normal, lactate normal, etiology is unclear  follow up MICU and endocrine
Holding BP meds in setting of possible sepsis, reassess and add back as indicated
Holding BP meds in setting of possible sepsis, reassess and add back as indicated

## 2023-01-30 NOTE — PROVIDER CONTACT NOTE (CRITICAL VALUE NOTIFICATION) - ACTION/TREATMENT ORDERED:
Repeat CBC and obtain type & screen. Repeat hgb was 7.5. Monitor for s/s of bleeding or distress.
monitor sodium levels
PRBC ordered
Blood Transfusion
Repeat labs

## 2023-01-30 NOTE — PROGRESS NOTE ADULT - PROBLEM SELECTOR PROBLEM 8
Need for prophylactic measure
Type 2 diabetes mellitus
Need for prophylactic measure
Type 2 diabetes mellitus

## 2023-01-30 NOTE — PROGRESS NOTE ADULT - PROBLEM SELECTOR PLAN 10
DVT ppx: HSQ for now may have to dc hsq if plts <50    Discussed with ACP    Dispo: patient is improving. PT recommending no needs - walks with rolling walker. Given worsening kidney function - will need to stay inpatient for monitoring and medication adjustment.
DVT ppx: HSQ for now may have to dc hsq if plts <50    Discussed with ACP    Dispo: patient is improving. PT recommending no needs - walks with rolling walker.
DVT ppx: HSQ for now may have to dc hsq if plts <50    Discussed with ACP    Dispo: patient remains very ill with multi-organ dysfunction, including but not limited to hypothermia, bicytopenia, liver dysfunction, cardiac diastolic dysfunction, hypothyroidism, acute kidney injury, respiratory failure, and electrolyte abnormalities.  I discussed this with her daughter Sulema Hernandez over the phone 1/21.
DVT ppx: HSQ for now may have to dc hsq if plts <50    Discussed with ACP    Dispo: patient is improving. PT recommending no needs - walks with rolling walker. Likely discharge over the weekend.
DVT ppx: HSQ for now may have to dc hsq if plts <50    Discussed with ACP
DVT ppx: HSQ for now may have to dc hsq if plts <50    Discussed with ACP    Dispo: patient is showing signs of improvement but is still medically active as she continues with IV diuresis. Pending next steps after RHC.
DVT ppx: HSQ for now may have to dc hsq if plts <50    Discussed with ACP    Dispo: patient remains very ill with multi-organ dysfunction, including but not limited to hypothermia, bicytopenia, liver dysfunction, cardiac diastolic dysfunction, hypothyroidism, acute kidney injury, respiratory failure, and electrolyte abnormalities.
DVT ppx: HSQ for now may have to dc hsq if plts <50    Discussed with ACP    Dispo: patient is showing signs of improvement but is still medically active as she continues with IV diuresis. Pending next steps after RHC.
DVT ppx: HSQ for now may have to dc hsq if plts <50    Discussed with ACP    Dispo: patient remains very ill with multi-organ dysfunction, including but not limited to hypothermia, bicytopenia, liver dysfunction, cardiac diastolic dysfunction, hypothyroidism, acute kidney injury, respiratory failure, and electrolyte abnormalities.  I discussed this with her daughter Sulema Hernandez over the phone 1/21.
DVT ppx: HSQ for now may have to dc hsq if plts <50    Discussed with ACP    Dispo: patient is improving. PT recommending no needs - walks with rolling walker. Given worsening kidney function - will need to stay inpatient for monitoring and medication adjustment.
Will not administer DVT prophylaxis, will need to workup anemia before
DVT ppx: HSQ for now may have to dc hsq if plts <50    Discussed with ACP    Dispo: patient is improving. PT recommending no needs - walks with rolling walker. Likely discharge over the weekend.

## 2023-01-30 NOTE — PROGRESS NOTE ADULT - PROBLEM SELECTOR PROBLEM 9
Essential hypertension
Metabolic acidosis with respiratory acidosis

## 2023-01-30 NOTE — PROGRESS NOTE ADULT - SUBJECTIVE AND OBJECTIVE BOX
Hospitalist Progress Note  Sophie Ferreira MD Pager # [phone]    OVERNIGHT EVENTS: LAURYN    SUBJECTIVE / INTERVAL HPI: Patient seen and examined at bedside. Patient reports she wants to go home and is upset her kidneys are the reason. She denies SOB or difficulty breathing. No dizziness or lightheadedness. All other ROS negative.     VITAL SIGNS:  Vital Signs Last 24 Hrs  T(C): 36.4 (30 Jan 2023 14:43), Max: 37.1 (30 Jan 2023 05:00)  T(F): 97.5 (30 Jan 2023 14:43), Max: 98.7 (30 Jan 2023 05:00)  HR: 60 (30 Jan 2023 14:43) (51 - 70)  BP: 116/50 (30 Jan 2023 14:43) (116/50 - 128/70)  BP(mean): --  RR: 18 (30 Jan 2023 14:43) (18 - 18)  SpO2: 98% (30 Jan 2023 14:43) (97% - 100%)    Parameters below as of 30 Jan 2023 14:43  Patient On (Oxygen Delivery Method): room air        PHYSICAL EXAM:    General: Comfortable appearing female resting in bed  HEENT: NC/AT; PERRL, anicteric sclera; MMM  Neck: supple  Cardiovascular: +S1/S2; RRR  Respiratory: CTA B/L; no W/R/R  Gastrointestinal: soft, NT/ND; +BSx4  Extremities: WWP; no edema, clubbing or cyanosis  Vascular: 2+ radial, DP/PT pulses B/L  Neurological: AAOx3; no focal deficits    MEDICATIONS:  MEDICATIONS  (STANDING):  albuterol/ipratropium for Nebulization 3 milliLiter(s) Nebulizer every 6 hours  buMETAnide 1 milliGRAM(s) Oral two times a day  dextrose 5%. 1000 milliLiter(s) (100 mL/Hr) IV Continuous <Continuous>  dextrose 5%. 1000 milliLiter(s) (50 mL/Hr) IV Continuous <Continuous>  dextrose 50% Injectable 25 Gram(s) IV Push once  dextrose 50% Injectable 12.5 Gram(s) IV Push once  dextrose 50% Injectable 25 Gram(s) IV Push once  glucagon  Injectable 1 milliGRAM(s) IntraMuscular once  heparin   Injectable 5000 Unit(s) SubCutaneous every 8 hours  hydrALAZINE 50 milliGRAM(s) Oral every 8 hours  influenza   Vaccine 0.5 milliLiter(s) IntraMuscular once  insulin glargine Injectable (LANTUS) 3 Unit(s) SubCutaneous at bedtime  insulin lispro (ADMELOG) corrective regimen sliding scale   SubCutaneous three times a day before meals  insulin lispro (ADMELOG) corrective regimen sliding scale   SubCutaneous at bedtime  insulin lispro Injectable (ADMELOG) 6 Unit(s) SubCutaneous three times a day before meals  labetalol 50 milliGRAM(s) Oral every 12 hours  levothyroxine 125 MICROGram(s) Oral daily  mupirocin 2% Ointment 1 Application(s) Topical three times a day  pantoprazole  Injectable 40 milliGRAM(s) IV Push daily  sodium chloride 3%. 500 milliLiter(s) (30 mL/Hr) IV Continuous <Continuous>    MEDICATIONS  (PRN):  acetaminophen     Tablet .. 650 milliGRAM(s) Oral every 6 hours PRN Temp greater or equal to 38C (100.4F), Mild Pain (1 - 3)  aluminum hydroxide/magnesium hydroxide/simethicone Suspension 30 milliLiter(s) Oral every 4 hours PRN Dyspepsia  dextrose Oral Gel 15 Gram(s) Oral once PRN Blood Glucose LESS THAN 70 milliGRAM(s)/deciliter  guaiFENesin Oral Liquid (Sugar-Free) 200 milliGRAM(s) Oral every 6 hours PRN Cough  melatonin 3 milliGRAM(s) Oral at bedtime PRN Insomnia  ondansetron Injectable 4 milliGRAM(s) IV Push every 8 hours PRN Nausea and/or Vomiting  sodium chloride 0.65% Nasal 1 Spray(s) Both Nostrils three times a day PRN nose bleed      ALLERGIES:  Allergies    No Known Allergies    Intolerances        LABS:                        6.9    8.23  )-----------( 113      ( 30 Jan 2023 10:00 )             21.8     01-30    128<L>  |  88<L>  |  66<H>  ----------------------------<  238<H>  4.6   |  29  |  2.17<H>    Ca    9.2      30 Jan 2023 07:00  Phos  5.2     01-30  Mg     1.80     01-30          CAPILLARY BLOOD GLUCOSE      POCT Blood Glucose.: 144 mg/dL (30 Jan 2023 11:30)      RADIOLOGY & ADDITIONAL TESTS: Reviewed.    ASSESSMENT:    PLAN:

## 2023-01-30 NOTE — PROGRESS NOTE ADULT - PROBLEM SELECTOR PLAN 1
Pt. presented with hypothermia and hypoxic respiratory failure. Sepsis 2/2 PNA resolved. Hypoxic respiratory failure was due to fluid overload. S/p IV diuresis. Required BiPAP and now on 4L NC. No longer requiring BiPAP. RHC performed showing increased wedge pressure and moderate-severe pulm HTN and elevated CVP. Pulm performed lung u/s this week and shows small-moderate left pleural effusion, not optimal window for therapeutic thoracentesis.   - Transitioned to oral bumex on 1/25, monitoring on oral meds for now - had to decrease bumex dose in the setting of contraction alkalosis - now on 1mg q12.   - On RA - ambulatory O2 sat on RA 92%.   - Will need outpatient cardiology and pulm follow up

## 2023-01-30 NOTE — PROVIDER CONTACT NOTE (CRITICAL VALUE NOTIFICATION) - RECOMMENDATIONS
Repeat labs
PRBC ordered
Blood Transfusion
Repeat CBC and obtain type & screen. Monitor for s/s of bleeding or distress.
monitor sodium levels

## 2023-01-30 NOTE — PROGRESS NOTE ADULT - PROBLEM SELECTOR PROBLEM 10
Need for prophylactic measure
Prophylactic measure

## 2023-01-30 NOTE — PROVIDER CONTACT NOTE (CRITICAL VALUE NOTIFICATION) - ASSESSMENT
Pt resting comfortably in bed, denies SOB and pain.
pt A&Ox4. No s/s of pain or discomfort. Denies SOB or MARKS. No signs of bleeding noted.
No s/s of bleeding noted. No s/s of distress noted. Patient resting comfortably in bed asleep at this time.
pt A&Ox4. No s/s of pain or discomfort. Denies SOB or MARKS. No signs of bleeding noted.
pt resting comfortably in bed VSS documented. Pt has low temperature at this time. Hypothermia blanket in use; provider already aware.

## 2023-01-30 NOTE — PROVIDER CONTACT NOTE (CRITICAL VALUE NOTIFICATION) - TEST AND RESULT REPORTED:
CR sodium 118
h/h 6.5/19.5
Hg 6.8 Hct 19.7
hemoglobin 6.9 and hematocrit 21.8
Hgb 6.9 & hct 21.1
Hemoglobin 6.7 hematocrit 20.6

## 2023-01-30 NOTE — PROVIDER CONTACT NOTE (CRITICAL VALUE NOTIFICATION) - SITUATION
Critical value notification- Hgb 6.9 & hct 21.1
CR value sodium 118
Cr Hg 6.8 Hct 19.7
Hemoglobin 6.7 hematocrit 20.6
hemoglobin 6.9 and hematocrit 21.8

## 2023-01-30 NOTE — CHART NOTE - NSCHARTNOTEFT_GEN_A_CORE
Chart reviewed   See last progress note for complete plan of care including discharge planning     DM  Borderline hypoglycemia at dinner last night, pre-meal Admelog held, hyperglycemia resulted  Would not change inpatient insulin regimen at this time   Glucose has been variable with some missed doses/refused doses  Discharge plan:  Likely to discharge patient home on home regimen (Tresiba and Prandin) Final regimen pending clinical course    Hypothyroidism  Continue levothyroxine 125 mcg po daily, take in AM on empty stomach    Endocrine      CAPILLARY BLOOD GLUCOSE      POCT Blood Glucose.: 144 mg/dL (30 Jan 2023 11:30)  POCT Blood Glucose.: 241 mg/dL (30 Jan 2023 07:39)  POCT Blood Glucose.: 281 mg/dL (29 Jan 2023 21:51)  POCT Blood Glucose.: >600 mg/dL (29 Jan 2023 21:50) (not accurate, rechecked 1 min later)  POCT Blood Glucose.: 78 mg/dL (29 Jan 2023 16:41)    Thyroid Stimulating Hormone, Serum: 1.35 uIU/mL (01-21-23 @ 04:50)  Thyroid Stimulating Hormone, Serum: 2.51 uIU/mL (01-18-23 @ 03:50)  Thyroid Stimulating Hormone, Serum: 2.56 uIU/mL (01-16-23 @ 20:15)        01-30    128<L>  |  88<L>  |  66<H>  ----------------------------<  238<H>  4.6   |  29  |  2.17<H>    Ca    9.2      30 Jan 2023 07:00  Phos  5.2     01-30  Mg     1.80     01-30      MEDICATIONS  (STANDING):  albuterol/ipratropium for Nebulization 3 milliLiter(s) Nebulizer every 6 hours  buMETAnide 1 milliGRAM(s) Oral two times a day  dextrose 5%. 1000 milliLiter(s) (50 mL/Hr) IV Continuous <Continuous>  dextrose 5%. 1000 milliLiter(s) (100 mL/Hr) IV Continuous <Continuous>  dextrose 50% Injectable 25 Gram(s) IV Push once  dextrose 50% Injectable 12.5 Gram(s) IV Push once  dextrose 50% Injectable 25 Gram(s) IV Push once  glucagon  Injectable 1 milliGRAM(s) IntraMuscular once  heparin   Injectable 5000 Unit(s) SubCutaneous every 8 hours  hydrALAZINE 50 milliGRAM(s) Oral every 8 hours  influenza   Vaccine 0.5 milliLiter(s) IntraMuscular once  insulin glargine Injectable (LANTUS) 3 Unit(s) SubCutaneous at bedtime  insulin lispro (ADMELOG) corrective regimen sliding scale   SubCutaneous three times a day before meals  insulin lispro (ADMELOG) corrective regimen sliding scale   SubCutaneous at bedtime  insulin lispro Injectable (ADMELOG) 6 Unit(s) SubCutaneous three times a day before meals  labetalol 50 milliGRAM(s) Oral every 12 hours  levothyroxine 125 MICROGram(s) Oral daily  mupirocin 2% Ointment 1 Application(s) Topical three times a day  pantoprazole  Injectable 40 milliGRAM(s) IV Push daily  sodium chloride 3%. 500 milliLiter(s) (30 mL/Hr) IV Continuous <Continuous>    Diet, DASH/TLC:   Sodium & Cholesterol Restricted  Consistent Carbohydrate {No Snacks} (CSTCHO)  1000mL Fluid Restriction ([id number]) (01-20-23 @ 07:52) [Active]    A1C with Estimated Average Glucose Result: 6.9 % (01-17-23 @ 07:10)  A1C with Estimated Average Glucose Result: 9.7 % (11-25-22 @ 14:20)  A1C with Estimated Average Glucose Result: 9.1 % (09-29-22 @ 06:00)  A1C with Estimated Average Glucose Result: 9.2 % (09-28-22 @ 07:47)

## 2023-01-30 NOTE — PROGRESS NOTE ADULT - PROBLEM SELECTOR PLAN 5
Creatinine now stable around 2 however BUN is rising. Discussed with nephrology and thinks possibly we may have overdiuresed given her bicarb is rising as well. Contraction alkalosis.   - Reduced bumex dose to 1mg q12 and will monitor for improvement.   - Monitor BMP  - Avoid nephrotoxins   - Is/Os

## 2023-01-30 NOTE — PROGRESS NOTE ADULT - PROBLEM SELECTOR PLAN 2
Seems to be due to bone marrow suppression in the setting of critical illness as per hematology. Transfuse for Hgb <7. Hgb 6.7 today. Receiving 1U PRBC  - Continue to trend Hgb   - Transfuse for Hgb <7.

## 2023-01-30 NOTE — PROGRESS NOTE ADULT - PROBLEM SELECTOR PLAN 7
Patient with two recent hospitalizations, one at [facility] and one at OSH for myxedema coma   - Endo following, currently no evidence of myxedema   - TSH wnl   - C/w iv synthroid

## 2023-01-31 VITALS
RESPIRATION RATE: 18 BRPM | TEMPERATURE: 98 F | SYSTOLIC BLOOD PRESSURE: 140 MMHG | OXYGEN SATURATION: 100 % | DIASTOLIC BLOOD PRESSURE: 80 MMHG | HEART RATE: 60 BPM

## 2023-01-31 LAB
ANION GAP SERPL CALC-SCNC: 13 MMOL/L — SIGNIFICANT CHANGE UP (ref 7–14)
BUN SERPL-MCNC: 72 MG/DL — HIGH (ref 7–23)
CALCIUM SERPL-MCNC: 9.5 MG/DL — SIGNIFICANT CHANGE UP (ref 8.4–10.5)
CHLORIDE SERPL-SCNC: 92 MMOL/L — LOW (ref 98–107)
CO2 SERPL-SCNC: 25 MMOL/L — SIGNIFICANT CHANGE UP (ref 22–31)
CREAT SERPL-MCNC: 2.27 MG/DL — HIGH (ref 0.5–1.3)
EGFR: 25 ML/MIN/1.73M2 — LOW
GLUCOSE BLDC GLUCOMTR-MCNC: 188 MG/DL — HIGH (ref 70–99)
GLUCOSE BLDC GLUCOMTR-MCNC: 280 MG/DL — HIGH (ref 70–99)
GLUCOSE BLDC GLUCOMTR-MCNC: 84 MG/DL — SIGNIFICANT CHANGE UP (ref 70–99)
GLUCOSE SERPL-MCNC: 179 MG/DL — HIGH (ref 70–99)
HCT VFR BLD CALC: 24.5 % — LOW (ref 34.5–45)
HGB BLD-MCNC: 7.9 G/DL — LOW (ref 11.5–15.5)
MAGNESIUM SERPL-MCNC: 1.7 MG/DL — SIGNIFICANT CHANGE UP (ref 1.6–2.6)
MCHC RBC-ENTMCNC: 26.5 PG — LOW (ref 27–34)
MCHC RBC-ENTMCNC: 32.2 GM/DL — SIGNIFICANT CHANGE UP (ref 32–36)
MCV RBC AUTO: 82.2 FL — SIGNIFICANT CHANGE UP (ref 80–100)
NRBC # BLD: 0 /100 WBCS — SIGNIFICANT CHANGE UP (ref 0–0)
NRBC # FLD: 0 K/UL — SIGNIFICANT CHANGE UP (ref 0–0)
PHOSPHATE SERPL-MCNC: 5.6 MG/DL — HIGH (ref 2.5–4.5)
PLATELET # BLD AUTO: 131 K/UL — LOW (ref 150–400)
POTASSIUM SERPL-MCNC: 5 MMOL/L — SIGNIFICANT CHANGE UP (ref 3.5–5.3)
POTASSIUM SERPL-SCNC: 5 MMOL/L — SIGNIFICANT CHANGE UP (ref 3.5–5.3)
RBC # BLD: 2.98 M/UL — LOW (ref 3.8–5.2)
RBC # FLD: 15.7 % — HIGH (ref 10.3–14.5)
SODIUM SERPL-SCNC: 130 MMOL/L — LOW (ref 135–145)
WBC # BLD: 8.86 K/UL — SIGNIFICANT CHANGE UP (ref 3.8–10.5)
WBC # FLD AUTO: 8.86 K/UL — SIGNIFICANT CHANGE UP (ref 3.8–10.5)

## 2023-01-31 PROCEDURE — 99232 SBSQ HOSP IP/OBS MODERATE 35: CPT

## 2023-01-31 PROCEDURE — 99239 HOSP IP/OBS DSCHRG MGMT >30: CPT

## 2023-01-31 PROCEDURE — 99232 SBSQ HOSP IP/OBS MODERATE 35: CPT | Mod: GC

## 2023-01-31 RX ORDER — GLUCAGON INJECTION, SOLUTION 0.5 MG/.1ML
1 INJECTION, SOLUTION SUBCUTANEOUS
Qty: 1 | Refills: 0
Start: 2023-01-31 | End: 2023-03-01

## 2023-01-31 RX ORDER — ATORVASTATIN CALCIUM 80 MG/1
1 TABLET, FILM COATED ORAL
Qty: 30 | Refills: 0
Start: 2023-01-31 | End: 2023-03-01

## 2023-01-31 RX ORDER — HYDRALAZINE HCL 50 MG
1 TABLET ORAL
Qty: 90 | Refills: 0
Start: 2023-01-31 | End: 2023-03-01

## 2023-01-31 RX ORDER — LABETALOL HCL 100 MG
0.5 TABLET ORAL
Qty: 0 | Refills: 0 | DISCHARGE

## 2023-01-31 RX ORDER — LABETALOL HCL 100 MG
0.5 TABLET ORAL
Qty: 30 | Refills: 0
Start: 2023-01-31 | End: 2023-03-01

## 2023-01-31 RX ORDER — PANTOPRAZOLE SODIUM 20 MG/1
1 TABLET, DELAYED RELEASE ORAL
Qty: 30 | Refills: 0
Start: 2023-01-31 | End: 2023-03-01

## 2023-01-31 RX ORDER — ISOPROPYL ALCOHOL, BENZOCAINE .7; .06 ML/ML; ML/ML
1 SWAB TOPICAL
Qty: 30 | Refills: 1
Start: 2023-01-31 | End: 2023-03-31

## 2023-01-31 RX ORDER — INSULIN DETEMIR 100/ML (3)
3 INSULIN PEN (ML) SUBCUTANEOUS
Qty: 15 | Refills: 0
Start: 2023-01-31 | End: 2023-03-01

## 2023-01-31 RX ORDER — LEVOTHYROXINE SODIUM 125 MCG
1 TABLET ORAL
Qty: 30 | Refills: 0
Start: 2023-01-31 | End: 2023-03-01

## 2023-01-31 RX ORDER — REPAGLINIDE 1 MG/1
1 TABLET ORAL
Qty: 90 | Refills: 0
Start: 2023-01-31 | End: 2023-03-01

## 2023-01-31 RX ORDER — BUMETANIDE 0.25 MG/ML
1 INJECTION INTRAMUSCULAR; INTRAVENOUS
Qty: 60 | Refills: 0
Start: 2023-01-31 | End: 2023-03-01

## 2023-01-31 RX ADMIN — PANTOPRAZOLE SODIUM 40 MILLIGRAM(S): 20 TABLET, DELAYED RELEASE ORAL at 12:15

## 2023-01-31 RX ADMIN — HEPARIN SODIUM 5000 UNIT(S): 5000 INJECTION INTRAVENOUS; SUBCUTANEOUS at 01:07

## 2023-01-31 RX ADMIN — Medication 3 MILLILITER(S): at 09:19

## 2023-01-31 RX ADMIN — Medication 3: at 12:16

## 2023-01-31 RX ADMIN — Medication 50 MILLIGRAM(S): at 14:33

## 2023-01-31 RX ADMIN — HEPARIN SODIUM 5000 UNIT(S): 5000 INJECTION INTRAVENOUS; SUBCUTANEOUS at 08:34

## 2023-01-31 RX ADMIN — Medication 6 UNIT(S): at 08:34

## 2023-01-31 RX ADMIN — BUMETANIDE 1 MILLIGRAM(S): 0.25 INJECTION INTRAMUSCULAR; INTRAVENOUS at 05:45

## 2023-01-31 RX ADMIN — Medication 125 MICROGRAM(S): at 05:27

## 2023-01-31 RX ADMIN — Medication 6 UNIT(S): at 12:17

## 2023-01-31 RX ADMIN — Medication 50 MILLIGRAM(S): at 05:27

## 2023-01-31 RX ADMIN — Medication 1: at 08:33

## 2023-01-31 RX ADMIN — SODIUM ZIRCONIUM CYCLOSILICATE 10 GRAM(S): 10 POWDER, FOR SUSPENSION ORAL at 01:07

## 2023-01-31 RX ADMIN — BUMETANIDE 1 MILLIGRAM(S): 0.25 INJECTION INTRAMUSCULAR; INTRAVENOUS at 14:34

## 2023-01-31 RX ADMIN — Medication 3 MILLILITER(S): at 15:09

## 2023-01-31 NOTE — PROGRESS NOTE ADULT - ATTENDING SUPERVISION STATEMENT
Fellow
Student
Fellow

## 2023-01-31 NOTE — PROGRESS NOTE ADULT - SUBJECTIVE AND OBJECTIVE BOX
Chief Complaint:     History:    MEDICATIONS  (STANDING):  albuterol/ipratropium for Nebulization 3 milliLiter(s) Nebulizer every 6 hours  buMETAnide 1 milliGRAM(s) Oral two times a day  dextrose 5%. 1000 milliLiter(s) (50 mL/Hr) IV Continuous <Continuous>  dextrose 5%. 1000 milliLiter(s) (100 mL/Hr) IV Continuous <Continuous>  dextrose 50% Injectable 25 Gram(s) IV Push once  dextrose 50% Injectable 12.5 Gram(s) IV Push once  dextrose 50% Injectable 25 Gram(s) IV Push once  glucagon  Injectable 1 milliGRAM(s) IntraMuscular once  heparin   Injectable 5000 Unit(s) SubCutaneous every 8 hours  hydrALAZINE 50 milliGRAM(s) Oral every 8 hours  influenza   Vaccine 0.5 milliLiter(s) IntraMuscular once  insulin glargine Injectable (LANTUS) 3 Unit(s) SubCutaneous at bedtime  insulin lispro (ADMELOG) corrective regimen sliding scale   SubCutaneous at bedtime  insulin lispro (ADMELOG) corrective regimen sliding scale   SubCutaneous three times a day before meals  insulin lispro Injectable (ADMELOG) 6 Unit(s) SubCutaneous three times a day before meals  labetalol 50 milliGRAM(s) Oral every 12 hours  levothyroxine 125 MICROGram(s) Oral daily  mupirocin 2% Ointment 1 Application(s) Topical three times a day  pantoprazole  Injectable 40 milliGRAM(s) IV Push daily  sodium chloride 3%. 500 milliLiter(s) (30 mL/Hr) IV Continuous <Continuous>    MEDICATIONS  (PRN):  acetaminophen     Tablet .. 650 milliGRAM(s) Oral every 6 hours PRN Temp greater or equal to 38C (100.4F), Mild Pain (1 - 3)  aluminum hydroxide/magnesium hydroxide/simethicone Suspension 30 milliLiter(s) Oral every 4 hours PRN Dyspepsia  dextrose Oral Gel 15 Gram(s) Oral once PRN Blood Glucose LESS THAN 70 milliGRAM(s)/deciliter  guaiFENesin Oral Liquid (Sugar-Free) 200 milliGRAM(s) Oral every 6 hours PRN Cough  melatonin 3 milliGRAM(s) Oral at bedtime PRN Insomnia  ondansetron Injectable 4 milliGRAM(s) IV Push every 8 hours PRN Nausea and/or Vomiting  sodium chloride 0.65% Nasal 1 Spray(s) Both Nostrils three times a day PRN nose bleed      Allergies: No Known Allergies    Review of Systems:  Respiratory: No SOB, no cough  GI: No nausea, vomiting, abdominal pain  Endocrine: no polyuria, polydipsia    PHYSICAL EXAM:  VITALS: T(C): 36.3 (01-31-23 @ 14:28)  T(F): 97.3 (01-31-23 @ 14:28), Max: 98 (01-31-23 @ 05:25)  HR: 55 (01-31-23 @ 14:28) (55 - 80)  BP: 145/82 (01-31-23 @ 14:28) (116/50 - 154/61)  RR:  (18 - 18)  SpO2:  (98% - 100%)  Wt(kg): --  GENERAL: NAD, well-groomed, well-developed  EYES: No proptosis, no lid lag, anicteric  HEENT:  Atraumatic, Normocephalic, moist mucous membranes  THYROID: Normal size, no palpable nodules  RESPIRATORY: No Labored breathing   GI: Soft, nontender, non distended  PSYCH: Alert and oriented x 3, normal affect, normal mood      CAPILLARY BLOOD GLUCOSE  POCT Blood Glucose.: 280 mg/dL (31 Jan 2023 11:30)  POCT Blood Glucose.: 188 mg/dL (31 Jan 2023 07:38)  POCT Blood Glucose.: 172 mg/dL (30 Jan 2023 21:14)  POCT Blood Glucose.: 175 mg/dL (30 Jan 2023 16:37)    A1C with Estimated Average Glucose (01.17.23 @ 07:10)    A1C with Estimated Average Glucose Result: 6.9    Estimated Average Glucose: 151      01-31    130<L>  |  92<L>  |  72<H>  ----------------------------<  179<H>  5.0   |  25  |  2.27<H>    eGFR: 25<L>    Ca    9.5      01-31  Mg     1.70     01-31  Phos  5.6     01-31      Thyroid Function Tests:  01-21 @ 04:50 TSH 1.35 FreeT4 -- T3 -- Anti TPO -- Anti Thyroglobulin Ab -- TSI --  01-20 @ 06:04 TSH -- FreeT4 1.6 T3 -- Anti TPO -- Anti Thyroglobulin Ab -- TSI --      A1C with Estimated Average Glucose (01.17.23 @ 07:10)    A1C with Estimated Average Glucose Result: 6.9    Estimated Average Glucose: 151                     Chief Complaint: Controlled T2DM with hyperglycemia; Hypothyroidism, not in myxedema    History: Pt seen at bedside. Pt tolerating oral diet. Pt denies nausea and vomiting/any signs of hypoglycemia. Pt reports an adequate appetite.     MEDICATIONS  (STANDING):  albuterol/ipratropium for Nebulization 3 milliLiter(s) Nebulizer every 6 hours  buMETAnide 1 milliGRAM(s) Oral two times a day  dextrose 5%. 1000 milliLiter(s) (50 mL/Hr) IV Continuous <Continuous>  dextrose 5%. 1000 milliLiter(s) (100 mL/Hr) IV Continuous <Continuous>  dextrose 50% Injectable 25 Gram(s) IV Push once  dextrose 50% Injectable 12.5 Gram(s) IV Push once  dextrose 50% Injectable 25 Gram(s) IV Push once  glucagon  Injectable 1 milliGRAM(s) IntraMuscular once  heparin   Injectable 5000 Unit(s) SubCutaneous every 8 hours  hydrALAZINE 50 milliGRAM(s) Oral every 8 hours  influenza   Vaccine 0.5 milliLiter(s) IntraMuscular once  insulin glargine Injectable (LANTUS) 3 Unit(s) SubCutaneous at bedtime  insulin lispro (ADMELOG) corrective regimen sliding scale   SubCutaneous at bedtime  insulin lispro (ADMELOG) corrective regimen sliding scale   SubCutaneous three times a day before meals  insulin lispro Injectable (ADMELOG) 6 Unit(s) SubCutaneous three times a day before meals  labetalol 50 milliGRAM(s) Oral every 12 hours  levothyroxine 125 MICROGram(s) Oral daily  mupirocin 2% Ointment 1 Application(s) Topical three times a day  pantoprazole  Injectable 40 milliGRAM(s) IV Push daily  sodium chloride 3%. 500 milliLiter(s) (30 mL/Hr) IV Continuous <Continuous>    MEDICATIONS  (PRN):  acetaminophen     Tablet .. 650 milliGRAM(s) Oral every 6 hours PRN Temp greater or equal to 38C (100.4F), Mild Pain (1 - 3)  aluminum hydroxide/magnesium hydroxide/simethicone Suspension 30 milliLiter(s) Oral every 4 hours PRN Dyspepsia  dextrose Oral Gel 15 Gram(s) Oral once PRN Blood Glucose LESS THAN 70 milliGRAM(s)/deciliter  guaiFENesin Oral Liquid (Sugar-Free) 200 milliGRAM(s) Oral every 6 hours PRN Cough  melatonin 3 milliGRAM(s) Oral at bedtime PRN Insomnia  ondansetron Injectable 4 milliGRAM(s) IV Push every 8 hours PRN Nausea and/or Vomiting  sodium chloride 0.65% Nasal 1 Spray(s) Both Nostrils three times a day PRN nose bleed      Allergies: No Known Allergies    Review of Systems:  Respiratory: No SOB, no cough  GI: No nausea, vomiting, abdominal pain  Endocrine: no polyuria, polydipsia    PHYSICAL EXAM:  VITALS: T(C): 36.3 (01-31-23 @ 14:28)  T(F): 97.3 (01-31-23 @ 14:28), Max: 98 (01-31-23 @ 05:25)  HR: 55 (01-31-23 @ 14:28) (55 - 80)  BP: 145/82 (01-31-23 @ 14:28) (116/50 - 154/61)  RR:  (18 - 18)  SpO2:  (98% - 100%)  Wt(kg): --  GENERAL: NAD, well-groomed, well-developed  EYES: No proptosis, no lid lag, anicteric  HEENT:  Atraumatic, Normocephalic, moist mucous membranes  THYROID: Normal size, no palpable nodules  RESPIRATORY: No Labored breathing   GI: Soft, nontender, non distended  PSYCH: Alert and oriented x 3, normal affect, normal mood      CAPILLARY BLOOD GLUCOSE  POCT Blood Glucose.: 280 mg/dL (31 Jan 2023 11:30)  POCT Blood Glucose.: 188 mg/dL (31 Jan 2023 07:38)  POCT Blood Glucose.: 172 mg/dL (30 Jan 2023 21:14)  POCT Blood Glucose.: 175 mg/dL (30 Jan 2023 16:37)    A1C with Estimated Average Glucose (01.17.23 @ 07:10)    A1C with Estimated Average Glucose Result: 6.9    Estimated Average Glucose: 151      01-31    130<L>  |  92<L>  |  72<H>  ----------------------------<  179<H>  5.0   |  25  |  2.27<H>    eGFR: 25<L>    Ca    9.5      01-31  Mg     1.70     01-31  Phos  5.6     01-31      Thyroid Function Tests:  01-21 @ 04:50 TSH 1.35 FreeT4 -- T3 -- Anti TPO -- Anti Thyroglobulin Ab -- TSI --  01-20 @ 06:04 TSH -- FreeT4 1.6 T3 -- Anti TPO -- Anti Thyroglobulin Ab -- TSI --      A1C with Estimated Average Glucose (01.17.23 @ 07:10)    A1C with Estimated Average Glucose Result: 6.9    Estimated Average Glucose: 151

## 2023-01-31 NOTE — PROGRESS NOTE ADULT - PROVIDER SPECIALTY LIST ADULT
Nephrology
Nephrology
Pulmonology
Endocrinology
Nephrology
Nephrology
Pulmonology
Cardiology
Pulmonology
Pulmonology
Cardiology
Nephrology
Pulmonology
Endocrinology
Pulmonology
Endocrinology
Hospitalist
Nephrology
Endocrinology
Nephrology
Hospitalist
Nephrology
Hospitalist

## 2023-01-31 NOTE — PROGRESS NOTE ADULT - PROBLEM SELECTOR PLAN 2
Pt. with hyponatremia in the setting of fluid overload on admission. SNa stable at 130 today. Recommend to continue with diuretic therapy, as above. Restrict fluid intake <1L per day. Monitor SNa daily. Pt. with hyponatremia in the setting of fluid overload on admission. SNa low/stable at 130 today. Recommend to continue with diuretic therapy, as above. Restrict fluid intake <1L per day. Monitor SNa daily.

## 2023-01-31 NOTE — PROGRESS NOTE ADULT - ATTENDING COMMENTS
57F hypothyroidism, asthma, HTN, T2DM, hyperlipidemia, CKD, presented to the ED with shortness of breath and acute hypoxemic respiratory failure.   - Clinically improved with improved respiratory status  - POCUS shows improved size of pleural effusions  - Continue diuresis.  - Continue bilevel qHS  - Duonebs ATC  - TTE  - Check sputum culture, if able  - Continue Zosyn
Diarrhoea overnight, being ruled out c-diff. Renal function decreased Cr 2.5 from 2.2.   RHC to assess invasive hemodynamic parameters and guide diuretic strategy.   Appreciate renal recommendations.
Respiratory failure likely multifactorial in setting of volume overload and possible PNA. Patient has moderate bilateral pleural effusions on imaging with dense consolidations bilaterally. She has lower extremity edema. Pro BNP 1600. Diastolic HF, RV dysfx, moderate pum htn, f/u RHC. CTPA negative for segmental PE, limited, getting diuresed with bumex 2 mg Q8. ? need for bilevel, pt unclear regarding adherence and tolerance.
Patient admitted for hypoxemic respiratory failure, and pulmonary edema, history of CKD, hospitalization for myxedema coma and multiorgan failure at [facility] per chart.  Right heart cath on 1/23/2023 demonstrates PVR normal, elevated PCWP, mPAP 41, s/o group 2 PHTN.  Limited CTPA however no demonstrated lobar, segmental and subsegmental PE.  Patient denies history of pulmonary disease and no history of home O2 use.  Lung parenchyma on CT chest is consistent with pulmonary edema, no clear evidence of fibrosis.  With history of CKD, on Bumex 2 mg Q8 with net -1000 to 1400 cc over 24 hours over the past few days.  Lower extremity edema appears to be improved.  Unable to wean O2, on 4 L she is 97%, desaturates to 91% at rest on 2 L.  Agree with continuing diuresis.  1.5 L negative/last 24 hours.  on Lung US today, small right pleural effusion, small-moderate left pleural effusion, not optimal window for therapeutic thoracentesis.
57F hypothyroidism, asthma, HTN, T2DM, hyperlipidemia, CKD, presented to the ED with shortness of breath and acute hypoxemic respiratory failure.   - Pleural effusions have been improving with diuresis. She remains fluid overloaded clinically with bilateral lower extremity edema. Does not appear euvolemic at this time and would recommend to continue diuresis. As she is responding to diuresis, there is no plan for thoracentesis at this time. TTE shows diastolic dysfunction, RV dysfunction, and pulmonary hypertension. Follow up eventual RHC.
Patient admitted for hypoxemic respiratory failure, and pulmonary edema, history of CKD, hospitalization for myxedema coma and multiorgan failure at [facility] per chart.  Right heart cath on 1/23/2023 demonstrates PVR normal, elevated PCWP, mPAP 41, s/o group 2 PHTN.  Limited CTPA however no demonstrated lobar, segmental and subsegmental PE.  Patient denies history of pulmonary disease and no history of home O2 use.  Lung parenchyma on CT chest is consistent with pulmonary edema, no clear evidence of fibrosis.  With history of CKD, on Bumex 2 mg Q8 with net -1000 to 1400 cc over 24 hours over the past few days.  Lower extremity edema appears to be improved.  Unable to wean O2, on 4 L she is 97%, desaturates to 91% at rest on 2 L.  Agree with continuing diuresis. We will plan to ultrasound lungs to evaluate bilateral pleural effusions.    Patient does not appear to be hypercapnic, highest PCO2 of 51 on VBG performed on 1/16.  Patient had epistaxis on BiPAP, not tolerating it.  Please repeat ABG to reassess need for NIV.
57F hypothyroidism which was uncontrolled last admission, levothyroxine raised and now with normal TSH (follow up free T4) therefore not indicative of myxedema. AM cortisol robust and not low. Regarding hyponatremia check for nonendocrine etiologies.  change to IV formulation if not taking PO   please evaluate cough and workup as this is pts main concern at this time
1. ISAMAR on CKD - creatinine stable but with increasing BUN and elevated bicarbonate consistent with developing contraction alkalosis.  Diuretic adjustment as noted above.    2. Hypertension - monitor on current regimen.  3. Hyponatremia - fluid restrict and monitor on adjusted diuretics.
ISAMAR on CKD  Hyponatremia    REspiratory status improved, please cont diuresis  Monitor Is/Os and daily weights
Pt. with ISAMAR on CKD and hyponatremia. Scr elevated/stable at 2.27 today. Assessment and plan for ISAMAR and hyponatremia as outlined above. Monitor labs and urine output. Avoid any potential nephrotoxins. Dose medications as per eGFR.
ISAMAR  Hyponatremia  Hypervolemia    On diuretics, cont to monitor Is/Os and labs for now
ISAMAR  Volume overload  Hyperkalemia    For RHC today, monitor renal function  Hold bumex today but she will likely need it tomorrow
ISAMAR on CKD  Hyponatremia    Hypervolemic  Cont diuresis with bumex, avoid nephrotoxic agents
ISAMAR on CKD  Hyponatremia  Hyperkalemia    Cont to monitor while on diuresis
ISAMAR, Hyperkalemia, Hyponatremia, Hypervolemia   IV bumex  LokelmA
ISAMAR on CKD  Hypervolemia  Hyponatremia    Improving clinically today, cont Bumex diuresis    Will monitor Is/Os and daily weights
ISAMAR on CKD  Hyponatremia    Improving with diuresis, cont po meds  Monitor Is/Os and daily weights
Pt. with ISAMAR on CKD, hyponatremia and hyperkalemia. Scr increased to 2.9 today. Assessment and plan for AKIon CKD, hyponatremia and hyperkalemia as outlined above. Monitor labs and urine output. Avoid any potential nephrotoxins. Dose medications as per eGFR.

## 2023-01-31 NOTE — PROGRESS NOTE ADULT - PROBLEM SELECTOR PLAN 1
Pt. with ISAMAR on CKD in the setting of infection and recent IV contrast use. CT scan with IV contrast was done on 1/17. Scr peak at 3.19 on 1/23. Scr remains elevated but continues to improve to 2.27 (1/31/23). She is non-oliguric. Spot urine TP/CR ratio was significantly elevated at 5.9. C3 and C4 were not low. Testing for HIV was negative. STEVO, HBsAg and Hep C Ab were nonreactive/negative, negative ANCA levels and serum VEE. Pt currently on PO Bumex 1 mg BID and recommend continuing at this dose upon discharge. Continue to monitor labs and urine output while admitted. Avoid any potential nephrotoxins. Dose medications as per eGFR.

## 2023-01-31 NOTE — PROGRESS NOTE ADULT - ASSESSMENT
57F with hypothyroidism, HTN, T2DM, HLD, CKD, PAH, asthma on albuterol presented to the ED w/respiratory distress. Endocrine consulted for hypothyroidism and DM2.    Hypothyroidism, not in myxedema  Taking Levothyroxine 125 mcg PO as prescribed. TSH 2.56 on 1/17/23. FT4 1.6 normal.  Patient had not received 125 mcg PO levothyroxine for 2 days, unclear why, Switched to IV formulation at 94 mcg  Will stop IV dose tonight to transition to oral therapy tomorrow morning   Low sodium and hypothermia unlikely to be related to hypothyroidism (or adrenal insufficiency). Defer to non-endocrine workup. Nephrology following   Discharge plan: levothyroxine 125 mcg po daily, take in AM on empty stomach    Controlled T2DM with hyperglycemia  Patient with a hx of DM2. HbA1c 6.9%. Has had DM for 23 years. Takes repaglinide 2mg TIDac and Tresiba 1-12 units depending on BG. Checks BG at bedtime. Sometimes BG in the 200s. No lows. Has a good appetite.  -Hold non-insulin DM agents while inpatient  -BG target 100-180 - prandial glucose above goal   -Increasing Admelog to 5 units premeal TID (please hold if npo/not eating)  -Increase Lantus slightly to 3 units qhs  -continue low dose Admelog correction scale pre-meal  -continue low dose Admelog correction scale at bedtime  -Fingerstick BG before meals and bedtime  -Carbohydrate consistent diet  -Hypoglycemia Protocol   -RD consult completed     Discharge plan:  Likely to discharge patient home on home regimen (see above). Final regimen pending clinical course.  Ensure patient has working glucometer, test strips, lancets, alcohol pads, and BD pastor pen needles  Please also prescribe glucose tabs, Baqsimi nasal spray or glucagon emergency kit for hypoglycemia risk   Please follow up with your pcp, endocrinologist Dr. Karma Henriquez, podiatrist, and opthalmologist as an outpt     HTN   -Goal SBP <130/80  -on Labetolol   -Obtain urine micro albumin cr ratio as an outpt   -Titration as per primary team     HLD  -On atorvastatin 40mg daily.   -Continue this if no contraindications.  -Obtain outpt lipid panel     KERRIE Gerardo-BC  Nurse Practitioner   Division of Endocrinology  Pager: EDEN pager [phone]    If out of hospital/unavailable when paged, please note: patient will be cared for by another provider on the endocrine service.  Please call the endocrine answering service for assistance to reach covering provider ([phone]). For non-urgent matters, please email [email or web address] for assistance.      57F with hypothyroidism, HTN, T2DM, HLD, CKD, PAH, asthma on albuterol presented to the ED w/respiratory distress. Endocrine consulted for hypothyroidism and DM2.    Hypothyroidism, not in myxedema  Taking Levothyroxine 125 mcg PO as prescribed. TSH 2.56 on 1/17/23. FT4 1.6 normal.  Patient had not received 125 mcg PO levothyroxine for 2 days, unclear why, Switched to IV formulation at 94 mcg  Low sodium and hypothermia unlikely to be related to hypothyroidism (or adrenal insufficiency). Defer to non-endocrine workup. Nephrology following   Discharge plan: levothyroxine 125 mcg po daily, take in AM on empty stomach  Repeat TFTs within 4-6 weeks of discharge     Controlled T2DM with hyperglycemia  Patient with a hx of DM2. HbA1c 6.9%. Has had DM for 23 years. Takes repaglinide 2mg TIDac and Tresiba 1-12 units depending on BG. Checks BG at bedtime. Sometimes BG in the 200s. No lows. Has a good appetite.  -Hold non-insulin DM agents while inpatient  -BG target 100-180 -elevated glucose at lunch today As per pt and RN pt eating fruits in between meals   -Conitnue Admelog 6 units premeal TID (please hold if npo/not eating)  -Continue Lantus 3 units qhs  -continue low dose Admelog correction scale pre-meal  -continue low dose Admelog correction scale at bedtime  -Fingerstick BG before meals and bedtime  -Carbohydrate consistent diet  -Hypoglycemia Protocol   -RD consult completed     Discharge plan:  Please discharge pt on Tresiba Pen 3 units sq qhs and Prandin 1mg p.o. TID AC (please hold if skips meals): IF glucose above 200 and or 70 and below pt instructed to call her endocrinologist for medication dose adjustments  Ensure patient has working glucometer, test strips, lancets, alcohol pads, and BD pastor pen needles  Please also prescribe glucose tabs, Baqsimi nasal spray or glucagon emergency kit for hypoglycemia risk   Please follow up with your pcp, endocrinologist Dr. Karma Henriquez, podiatrist, and opthalmologist as an outpt     HTN   -Goal SBP <130/80  -on Labetolol   -Obtain urine micro albumin cr ratio as an outpt   -Titration as per primary team     HLD  -On atorvastatin 40mg daily.   -Continue this if no contraindications.  -Obtain outpt lipid panel     Marilyn Seals  Nurse Practitioner  Division of Endocrinology & Diabetes  In house pager #[phone]    If before 9AM or after 6PM, or on weekends/holidays, please call endocrine answering service for assistance ([phone]).For nonurgent matters email [email or web address] for assistance.

## 2023-01-31 NOTE — CHART NOTE - NSCHARTNOTEFT_GEN_A_CORE
Confirmed discharge medications with nephrology - Bumex 1mg BID  Spoke with endocrine NP regarding discharge medications  Sent all medications to pharmacy and went over care plan including close follow up with repeat labs to monitor Cr on Bumex  Pulm emailed for appointment    Marixa Cohn PA-C  Medicine Team  In House [phone]

## 2023-01-31 NOTE — CHART NOTE - NSCHARTNOTESELECT_GEN_ALL_CORE
Endocrine/Event Note
Event Note
Hematology/Off Service Note
Endocrine
Endocrine
Event Note
Event Note
Nephrology/Event Note
pulmonary pocus note

## 2023-01-31 NOTE — PROGRESS NOTE ADULT - REASON FOR ADMISSION
Respiratory distress

## 2023-01-31 NOTE — CHART NOTE - NSCHARTNOTEFT_GEN_A_CORE
56 yo F with a recent hospitalization (12/16/22 - 1/4/23) for myxedema coma requiring intubation, hypothyroidism, HTN, DM, HLD, CKD, pHTN, asthma admitted for acute hypoxic respiratory failure secondary to pneumonia and volume overload, found to be incidentally bicytopenic.    Hematology consulted for bicytopenia. Pt initially presented with platelets of 122 that have decreased to ~60K and a Hb ~7 (with 1 unit transfused during this admission). Per chart review, patient's counts were normal in October 2022. Given prolonged prior hospitalization and multiple comorbidites, suspect that anemia is likely multifactorial from renal insufficiency, bone marrow ischemia (during ICU stay at OSH), hypothyroidism, and suppression from critical illness. Peripheral smear showed no schistocytes, no dysplastic cells, no platelet clumps (a couple large platelets).    Counts are relatively stable.  Will sign off.  Patient can follow up with her PCP outpatient and when patient recovers from her illness above, recheck CBC. If concerns for lack of improvement in her counts at that time, PCP can refer patient to Hematology.      Aryles Hedjar, MD, PGY-5  Hematology/Oncology Fellow  [facility]  Pager: [phone]  After 5PM and on weekends and holidays, please call the inpatient fellow on call.

## 2023-01-31 NOTE — PROGRESS NOTE ADULT - SUBJECTIVE AND OBJECTIVE BOX
[facility] DIVISION OF KIDNEY DISEASES AND HYPERTENSION   FOLLOW UP NOTE  --------------------------------------------------------------------------------  Chief Complaint: ISAMAR on CKD    24 hour events/subjective: Pt. was seen and examined today. No overnight events. She states she is urinating without difficulty. She is feeling well and hopeful to go home soon.      PAST HISTORY  --------------------------------------------------------------------------------  No significant changes to PMH, PSH, FHx, SHx, unless otherwise noted    ALLERGIES & MEDICATIONS  --------------------------------------------------------------------------------  Allergies  No Known Allergies    Intolerances    Standing Inpatient Medications  albuterol/ipratropium for Nebulization 3 milliLiter(s) Nebulizer every 6 hours  buMETAnide 1 milliGRAM(s) Oral two times a day  dextrose 5%. 1000 milliLiter(s) IV Continuous <Continuous>  dextrose 5%. 1000 milliLiter(s) IV Continuous <Continuous>  dextrose 50% Injectable 25 Gram(s) IV Push once  dextrose 50% Injectable 12.5 Gram(s) IV Push once  dextrose 50% Injectable 25 Gram(s) IV Push once  glucagon  Injectable 1 milliGRAM(s) IntraMuscular once  heparin   Injectable 5000 Unit(s) SubCutaneous every 8 hours  hydrALAZINE 50 milliGRAM(s) Oral every 8 hours  influenza   Vaccine 0.5 milliLiter(s) IntraMuscular once  insulin glargine Injectable (LANTUS) 3 Unit(s) SubCutaneous at bedtime  insulin lispro (ADMELOG) corrective regimen sliding scale   SubCutaneous at bedtime  insulin lispro (ADMELOG) corrective regimen sliding scale   SubCutaneous three times a day before meals  insulin lispro Injectable (ADMELOG) 6 Unit(s) SubCutaneous three times a day before meals  labetalol 50 milliGRAM(s) Oral every 12 hours  levothyroxine 125 MICROGram(s) Oral daily  mupirocin 2% Ointment 1 Application(s) Topical three times a day  pantoprazole  Injectable 40 milliGRAM(s) IV Push daily  sodium chloride 3%. 500 milliLiter(s) IV Continuous <Continuous>    PRN Inpatient Medications  acetaminophen     Tablet .. 650 milliGRAM(s) Oral every 6 hours PRN  aluminum hydroxide/magnesium hydroxide/simethicone Suspension 30 milliLiter(s) Oral every 4 hours PRN  dextrose Oral Gel 15 Gram(s) Oral once PRN  guaiFENesin Oral Liquid (Sugar-Free) 200 milliGRAM(s) Oral every 6 hours PRN  melatonin 3 milliGRAM(s) Oral at bedtime PRN  ondansetron Injectable 4 milliGRAM(s) IV Push every 8 hours PRN  sodium chloride 0.65% Nasal 1 Spray(s) Both Nostrils three times a day PRN    REVIEW OF SYSTEMS  --------------------------------------------------------------------------------  Gen: No fevers/chills  Head/Eyes/Ears: No HA   Respiratory: No dyspnea, cough  CV: No chest pain  GI: No abdominal pain, diarrhea  : +admit to urine output   MSK: No  edema  Skin: No rash  Heme: No easy bruising or bleeding    All other systems were reviewed and are negative, except as noted.    VITALS/PHYSICAL EXAM  --------------------------------------------------------------------------------  T(C): 36.3 (01-31-23 @ 14:28), Max: 36.7 (01-31-23 @ 05:25)  HR: 80 (01-31-23 @ 15:09) (55 - 82)  BP: 145/82 (01-31-23 @ 14:28) (139/57 - 154/61)  RR: 18 (01-31-23 @ 14:28) (18 - 18)  SpO2: 99% (01-31-23 @ 14:28) (98% - 100%)  Wt(kg): --  01-30-23 @ 07:01  -  01-31-23 @ 07:00  --------------------------------------------------------  IN: 0 mL / OUT: 500 mL / NET: -500 mL    Physical Exam:  	Gen: Laying in bed in NAD  	HEENT: Anicteric  	Pulm: CTA B/L  	CV: S1S2+  	Abd: Soft, +BS   	Ext: No LE edema B/L  	Neuro: Awake  	Skin: Warm and dry    LABS/STUDIES  --------------------------------------------------------------------------------              7.9    8.86  >-----------<  131      [01-31-23 @ 04:58]              24.5     130  |  92  |  72  ----------------------------<  179      [01-31-23 @ 04:58]  5.0   |  25  |  2.27        Ca     9.5     [01-31-23 @ 04:58]      Mg     1.70     [01-31-23 @ 04:58]      Phos  5.6     [01-31-23 @ 04:58]    Creatinine Trend:  SCr 2.27 [01-31 @ 04:58]  SCr 2.32 [01-30 @ 22:50]  SCr 2.17 [01-30 @ 07:00]  SCr 2.08 [01-29 @ 07:17]  SCr 2.05 [01-28 @ 06:10]

## 2023-02-02 ENCOUNTER — APPOINTMENT (OUTPATIENT)
Dept: NEPHROLOGY | Facility: CLINIC | Age: 58
End: 2023-02-02

## 2023-02-08 ENCOUNTER — APPOINTMENT (OUTPATIENT)
Dept: PULMONOLOGY | Facility: CLINIC | Age: 58
End: 2023-02-08
Payer: COMMERCIAL

## 2023-02-08 VITALS
HEIGHT: 62 IN | HEART RATE: 50 BPM | SYSTOLIC BLOOD PRESSURE: 120 MMHG | BODY MASS INDEX: 21.92 KG/M2 | RESPIRATION RATE: 15 BRPM | TEMPERATURE: 97.5 F | WEIGHT: 119.13 LBS | OXYGEN SATURATION: 98 % | DIASTOLIC BLOOD PRESSURE: 56 MMHG

## 2023-02-08 PROCEDURE — 99205 OFFICE O/P NEW HI 60 MIN: CPT | Mod: 25

## 2023-02-08 PROCEDURE — 76604 US EXAM CHEST: CPT

## 2023-02-08 NOTE — PHYSICAL EXAM
[No Acute Distress] : no acute distress [Normal Oropharynx] : normal oropharynx [Normal Appearance] : normal appearance [Normal Rate/Rhythm] : normal rate/rhythm [Normal S1, S2] : normal s1, s2 [No Murmurs] : no murmurs [No Resp Distress] : no resp distress [No Acc Muscle Use] : no acc muscle use [Benign] : benign [No Focal Deficits] : no focal deficits [Oriented x3] : oriented x3 [Normal Affect] : normal affect [TextBox_68] : no wheezing, speakin gin full sentences, no crackles on exam  [TextBox_105] : chronic venous stasis, no edema today

## 2023-02-08 NOTE — PROCEDURE
[Thoracic Ultrasound] : Thoracic Ultrasound [B line] : B line: Yes [Pleural Effusion] : Pleural Effusion: Yes [Simple] : simple [Lung sliding] : Lung sliding: Yes [Consolidation] : Consolidation: No [FreeTextEntry2] : Trace bilateral pleural effusions R>L. Effusions are small and appear simple. Lung sliding present, occasional B lines present bilaterally

## 2023-02-08 NOTE — REASON FOR VISIT
[Follow-Up - From Hospitalization] : a follow-up visit after a recent hospitalization [Pulmonary Hypertension] : pulmonary hypertension [Family Member] : family member [TextBox_44] : Heart failure

## 2023-02-25 ENCOUNTER — INPATIENT (INPATIENT)
Facility: HOSPITAL | Age: 58
LOS: 3 days | Discharge: HOME | End: 2023-03-01
Attending: HOSPITALIST | Admitting: HOSPITALIST
Payer: COMMERCIAL

## 2023-02-25 VITALS
HEART RATE: 44 BPM | SYSTOLIC BLOOD PRESSURE: 98 MMHG | RESPIRATION RATE: 18 BRPM | HEIGHT: 60 IN | OXYGEN SATURATION: 99 % | DIASTOLIC BLOOD PRESSURE: 60 MMHG

## 2023-02-25 DIAGNOSIS — N17.9 ACUTE KIDNEY FAILURE, UNSPECIFIED: ICD-10-CM

## 2023-02-25 DIAGNOSIS — I10 ESSENTIAL (PRIMARY) HYPERTENSION: ICD-10-CM

## 2023-02-25 DIAGNOSIS — R00.1 BRADYCARDIA, UNSPECIFIED: ICD-10-CM

## 2023-02-25 DIAGNOSIS — R74.01 ELEVATION OF LEVELS OF LIVER TRANSAMINASE LEVELS: ICD-10-CM

## 2023-02-25 DIAGNOSIS — D64.9 ANEMIA, UNSPECIFIED: ICD-10-CM

## 2023-02-25 DIAGNOSIS — E03.5 MYXEDEMA COMA: ICD-10-CM

## 2023-02-25 DIAGNOSIS — E87.1 HYPO-OSMOLALITY AND HYPONATREMIA: ICD-10-CM

## 2023-02-25 DIAGNOSIS — E11.9 TYPE 2 DIABETES MELLITUS WITHOUT COMPLICATIONS: ICD-10-CM

## 2023-02-25 DIAGNOSIS — E03.9 HYPOTHYROIDISM, UNSPECIFIED: ICD-10-CM

## 2023-02-25 DIAGNOSIS — I50.9 HEART FAILURE, UNSPECIFIED: ICD-10-CM

## 2023-02-25 DIAGNOSIS — Z29.9 ENCOUNTER FOR PROPHYLACTIC MEASURES, UNSPECIFIED: ICD-10-CM

## 2023-02-25 DIAGNOSIS — E87.5 HYPERKALEMIA: ICD-10-CM

## 2023-02-25 LAB
ALBUMIN SERPL ELPH-MCNC: 3.9 G/DL — SIGNIFICANT CHANGE UP (ref 3.3–5)
ALBUMIN SERPL ELPH-MCNC: 4 G/DL — SIGNIFICANT CHANGE UP (ref 3.3–5)
ALP SERPL-CCNC: 1137 U/L — HIGH (ref 40–120)
ALP SERPL-CCNC: 991 U/L — HIGH (ref 40–120)
ALT FLD-CCNC: 136 U/L — HIGH (ref 4–33)
ALT FLD-CCNC: 167 U/L — HIGH (ref 4–33)
ANION GAP SERPL CALC-SCNC: 13 MMOL/L — SIGNIFICANT CHANGE UP (ref 7–14)
ANION GAP SERPL CALC-SCNC: 14 MMOL/L — SIGNIFICANT CHANGE UP (ref 7–14)
ANION GAP SERPL CALC-SCNC: 15 MMOL/L — HIGH (ref 7–14)
ANION GAP SERPL CALC-SCNC: 16 MMOL/L — HIGH (ref 7–14)
ANION GAP SERPL CALC-SCNC: 16 MMOL/L — HIGH (ref 7–14)
APAP SERPL-MCNC: <10 UG/ML — LOW (ref 15–25)
APPEARANCE UR: CLEAR — SIGNIFICANT CHANGE UP
APTT BLD: 43.4 SEC — HIGH (ref 27–36.3)
AST SERPL-CCNC: 137 U/L — HIGH (ref 4–32)
AST SERPL-CCNC: 93 U/L — HIGH (ref 4–32)
BACTERIA # UR AUTO: ABNORMAL
BASE EXCESS BLDV CALC-SCNC: -10.4 MMOL/L — LOW (ref -2–3)
BASOPHILS # BLD AUTO: 0 K/UL — SIGNIFICANT CHANGE UP (ref 0–0.2)
BASOPHILS NFR BLD AUTO: 0 % — SIGNIFICANT CHANGE UP (ref 0–2)
BILIRUB SERPL-MCNC: 0.5 MG/DL — SIGNIFICANT CHANGE UP (ref 0.2–1.2)
BILIRUB SERPL-MCNC: 0.6 MG/DL — SIGNIFICANT CHANGE UP (ref 0.2–1.2)
BILIRUB UR-MCNC: NEGATIVE — SIGNIFICANT CHANGE UP
BLOOD GAS VENOUS COMPREHENSIVE RESULT: SIGNIFICANT CHANGE UP
BUN SERPL-MCNC: 105 MG/DL — HIGH (ref 7–23)
BUN SERPL-MCNC: 107 MG/DL — HIGH (ref 7–23)
BUN SERPL-MCNC: 108 MG/DL — HIGH (ref 7–23)
BUN SERPL-MCNC: 109 MG/DL — HIGH (ref 7–23)
BUN SERPL-MCNC: 114 MG/DL — HIGH (ref 7–23)
CA-I BLD-SCNC: 1.3 MMOL/L — HIGH (ref 1.15–1.29)
CALCIUM SERPL-MCNC: 10.1 MG/DL — SIGNIFICANT CHANGE UP (ref 8.4–10.5)
CALCIUM SERPL-MCNC: 10.3 MG/DL — SIGNIFICANT CHANGE UP (ref 8.4–10.5)
CALCIUM SERPL-MCNC: 10.7 MG/DL — HIGH (ref 8.4–10.5)
CALCIUM SERPL-MCNC: 9.2 MG/DL — SIGNIFICANT CHANGE UP (ref 8.4–10.5)
CALCIUM SERPL-MCNC: 9.9 MG/DL — SIGNIFICANT CHANGE UP (ref 8.4–10.5)
CHLORIDE BLDV-SCNC: 94 MMOL/L — LOW (ref 96–108)
CHLORIDE SERPL-SCNC: 90 MMOL/L — LOW (ref 98–107)
CHLORIDE SERPL-SCNC: 91 MMOL/L — LOW (ref 98–107)
CHLORIDE SERPL-SCNC: 94 MMOL/L — LOW (ref 98–107)
CHLORIDE SERPL-SCNC: 95 MMOL/L — LOW (ref 98–107)
CHLORIDE SERPL-SCNC: 96 MMOL/L — LOW (ref 98–107)
CO2 BLDV-SCNC: 17.5 MMOL/L — LOW (ref 22–26)
CO2 SERPL-SCNC: 16 MMOL/L — LOW (ref 22–31)
CO2 SERPL-SCNC: 16 MMOL/L — LOW (ref 22–31)
CO2 SERPL-SCNC: 17 MMOL/L — LOW (ref 22–31)
COLOR SPEC: SIGNIFICANT CHANGE UP
CORTIS AM PEAK SERPL-MCNC: 25.8 UG/DL — HIGH (ref 6–18.4)
CREAT ?TM UR-MCNC: 20 MG/DL — SIGNIFICANT CHANGE UP
CREAT SERPL-MCNC: 2.35 MG/DL — HIGH (ref 0.5–1.3)
CREAT SERPL-MCNC: 2.37 MG/DL — HIGH (ref 0.5–1.3)
CREAT SERPL-MCNC: 2.39 MG/DL — HIGH (ref 0.5–1.3)
CREAT SERPL-MCNC: 2.41 MG/DL — HIGH (ref 0.5–1.3)
CREAT SERPL-MCNC: 2.49 MG/DL — HIGH (ref 0.5–1.3)
DIFF PNL FLD: NEGATIVE — SIGNIFICANT CHANGE UP
EGFR: 22 ML/MIN/1.73M2 — LOW
EGFR: 23 ML/MIN/1.73M2 — LOW
EGFR: 24 ML/MIN/1.73M2 — LOW
ELLIPTOCYTES BLD QL SMEAR: SLIGHT — SIGNIFICANT CHANGE UP
EOSINOPHIL # BLD AUTO: 0.29 K/UL — SIGNIFICANT CHANGE UP (ref 0–0.5)
EOSINOPHIL NFR BLD AUTO: 4.4 % — SIGNIFICANT CHANGE UP (ref 0–6)
EPI CELLS # UR: SIGNIFICANT CHANGE UP
ETHANOL SERPL-MCNC: <10 MG/DL — SIGNIFICANT CHANGE UP
FLUAV AG NPH QL: SIGNIFICANT CHANGE UP
FLUBV AG NPH QL: SIGNIFICANT CHANGE UP
GAS PNL BLDV: 121 MMOL/L — LOW (ref 136–145)
GAS PNL BLDV: SIGNIFICANT CHANGE UP
GIANT PLATELETS BLD QL SMEAR: PRESENT — SIGNIFICANT CHANGE UP
GLUCOSE BLDC GLUCOMTR-MCNC: 104 MG/DL — HIGH (ref 70–99)
GLUCOSE BLDC GLUCOMTR-MCNC: 151 MG/DL — HIGH (ref 70–99)
GLUCOSE BLDC GLUCOMTR-MCNC: 155 MG/DL — HIGH (ref 70–99)
GLUCOSE BLDC GLUCOMTR-MCNC: 172 MG/DL — HIGH (ref 70–99)
GLUCOSE BLDC GLUCOMTR-MCNC: 222 MG/DL — HIGH (ref 70–99)
GLUCOSE BLDC GLUCOMTR-MCNC: 359 MG/DL — HIGH (ref 70–99)
GLUCOSE BLDC GLUCOMTR-MCNC: 376 MG/DL — HIGH (ref 70–99)
GLUCOSE BLDV-MCNC: 176 MG/DL — HIGH (ref 70–99)
GLUCOSE SERPL-MCNC: 131 MG/DL — HIGH (ref 70–99)
GLUCOSE SERPL-MCNC: 139 MG/DL — HIGH (ref 70–99)
GLUCOSE SERPL-MCNC: 171 MG/DL — HIGH (ref 70–99)
GLUCOSE SERPL-MCNC: 180 MG/DL — HIGH (ref 70–99)
GLUCOSE SERPL-MCNC: 209 MG/DL — HIGH (ref 70–99)
GLUCOSE UR QL: NEGATIVE — SIGNIFICANT CHANGE UP
HCO3 BLDV-SCNC: 16 MMOL/L — LOW (ref 22–29)
HCT VFR BLD CALC: 21.9 % — LOW (ref 34.5–45)
HCT VFR BLDA CALC: 23 % — LOW (ref 34.5–46.5)
HGB BLD CALC-MCNC: 7.7 G/DL — LOW (ref 11.7–16.1)
HGB BLD-MCNC: 7.2 G/DL — LOW (ref 11.5–15.5)
IANC: 5.34 K/UL — SIGNIFICANT CHANGE UP (ref 1.8–7.4)
INR BLD: 1.14 RATIO — SIGNIFICANT CHANGE UP (ref 0.88–1.16)
KETONES UR-MCNC: NEGATIVE — SIGNIFICANT CHANGE UP
LACTATE BLDV-MCNC: 1.5 MMOL/L — SIGNIFICANT CHANGE UP (ref 0.5–2)
LEUKOCYTE ESTERASE UR-ACNC: NEGATIVE — SIGNIFICANT CHANGE UP
LIDOCAIN IGE QN: 108 U/L — HIGH (ref 7–60)
LYMPHOCYTES # BLD AUTO: 0.35 K/UL — LOW (ref 1–3.3)
LYMPHOCYTES # BLD AUTO: 5.3 % — LOW (ref 13–44)
MAGNESIUM SERPL-MCNC: 1.9 MG/DL — SIGNIFICANT CHANGE UP (ref 1.6–2.6)
MAGNESIUM SERPL-MCNC: 2 MG/DL — SIGNIFICANT CHANGE UP (ref 1.6–2.6)
MAGNESIUM SERPL-MCNC: 2.2 MG/DL — SIGNIFICANT CHANGE UP (ref 1.6–2.6)
MANUAL SMEAR VERIFICATION: SIGNIFICANT CHANGE UP
MCHC RBC-ENTMCNC: 25.9 PG — LOW (ref 27–34)
MCHC RBC-ENTMCNC: 32.9 GM/DL — SIGNIFICANT CHANGE UP (ref 32–36)
MCV RBC AUTO: 78.8 FL — LOW (ref 80–100)
MONOCYTES # BLD AUTO: 0.35 K/UL — SIGNIFICANT CHANGE UP (ref 0–0.9)
MONOCYTES NFR BLD AUTO: 5.3 % — SIGNIFICANT CHANGE UP (ref 2–14)
NEUTROPHILS # BLD AUTO: 5.46 K/UL — SIGNIFICANT CHANGE UP (ref 1.8–7.4)
NEUTROPHILS NFR BLD AUTO: 82.4 % — HIGH (ref 43–77)
NEUTS BAND # BLD: 0.9 % — SIGNIFICANT CHANGE UP (ref 0–6)
NITRITE UR-MCNC: NEGATIVE — SIGNIFICANT CHANGE UP
NT-PROBNP SERPL-SCNC: 2919 PG/ML — HIGH
PCO2 BLDV: 39 MMHG — SIGNIFICANT CHANGE UP (ref 39–52)
PH BLDV: 7.23 — LOW (ref 7.32–7.43)
PH UR: 6 — SIGNIFICANT CHANGE UP (ref 5–8)
PHOSPHATE SERPL-MCNC: 4.8 MG/DL — HIGH (ref 2.5–4.5)
PHOSPHATE SERPL-MCNC: 4.9 MG/DL — HIGH (ref 2.5–4.5)
PHOSPHATE SERPL-MCNC: 5 MG/DL — HIGH (ref 2.5–4.5)
PLAT MORPH BLD: NORMAL — SIGNIFICANT CHANGE UP
PLATELET # BLD AUTO: 73 K/UL — LOW (ref 150–400)
PLATELET COUNT - ESTIMATE: ABNORMAL
PO2 BLDV: 77 MMHG — HIGH (ref 25–45)
POIKILOCYTOSIS BLD QL AUTO: SLIGHT — SIGNIFICANT CHANGE UP
POTASSIUM BLDV-SCNC: 6.2 MMOL/L — CRITICAL HIGH (ref 3.5–5.1)
POTASSIUM SERPL-MCNC: 4.5 MMOL/L — SIGNIFICANT CHANGE UP (ref 3.5–5.3)
POTASSIUM SERPL-MCNC: 5.5 MMOL/L — HIGH (ref 3.5–5.3)
POTASSIUM SERPL-MCNC: 5.8 MMOL/L — HIGH (ref 3.5–5.3)
POTASSIUM SERPL-MCNC: 5.9 MMOL/L — HIGH (ref 3.5–5.3)
POTASSIUM SERPL-MCNC: 6.3 MMOL/L — CRITICAL HIGH (ref 3.5–5.3)
POTASSIUM SERPL-SCNC: 4.5 MMOL/L — SIGNIFICANT CHANGE UP (ref 3.5–5.3)
POTASSIUM SERPL-SCNC: 5.5 MMOL/L — HIGH (ref 3.5–5.3)
POTASSIUM SERPL-SCNC: 5.8 MMOL/L — HIGH (ref 3.5–5.3)
POTASSIUM SERPL-SCNC: 5.9 MMOL/L — HIGH (ref 3.5–5.3)
POTASSIUM SERPL-SCNC: 6.3 MMOL/L — CRITICAL HIGH (ref 3.5–5.3)
PROT SERPL-MCNC: 7 G/DL — SIGNIFICANT CHANGE UP (ref 6–8.3)
PROT SERPL-MCNC: 7.3 G/DL — SIGNIFICANT CHANGE UP (ref 6–8.3)
PROT UR-MCNC: ABNORMAL
PROTHROM AB SERPL-ACNC: 13.3 SEC — SIGNIFICANT CHANGE UP (ref 10.5–13.4)
RBC # BLD: 2.78 M/UL — LOW (ref 3.8–5.2)
RBC # FLD: 14.8 % — HIGH (ref 10.3–14.5)
RBC BLD AUTO: ABNORMAL
RBC CASTS # UR COMP ASSIST: SIGNIFICANT CHANGE UP /HPF (ref 0–4)
RSV RNA NPH QL NAA+NON-PROBE: SIGNIFICANT CHANGE UP
SALICYLATES SERPL-MCNC: <0.3 MG/DL — LOW (ref 15–30)
SAO2 % BLDV: 96 % — HIGH (ref 67–88)
SARS-COV-2 RNA SPEC QL NAA+PROBE: SIGNIFICANT CHANGE UP
SODIUM SERPL-SCNC: 121 MMOL/L — LOW (ref 135–145)
SODIUM SERPL-SCNC: 123 MMOL/L — LOW (ref 135–145)
SODIUM SERPL-SCNC: 126 MMOL/L — LOW (ref 135–145)
SODIUM SERPL-SCNC: 126 MMOL/L — LOW (ref 135–145)
SODIUM SERPL-SCNC: 127 MMOL/L — LOW (ref 135–145)
SP GR SPEC: 1.01 — LOW (ref 1.01–1.05)
T3 SERPL-MCNC: 55 NG/DL — LOW (ref 80–200)
T4 AB SER-ACNC: 5.42 UG/DL — SIGNIFICANT CHANGE UP (ref 5.1–13)
T4 FREE SERPL-MCNC: 2.4 NG/DL — HIGH (ref 0.9–1.8)
TARGETS BLD QL SMEAR: SLIGHT — SIGNIFICANT CHANGE UP
TOXICOLOGY SCREEN, DRUGS OF ABUSE, SERUM RESULT: SIGNIFICANT CHANGE UP
TROPONIN T, HIGH SENSITIVITY RESULT: 42 NG/L — SIGNIFICANT CHANGE UP
TROPONIN T, HIGH SENSITIVITY RESULT: 46 NG/L — SIGNIFICANT CHANGE UP
TSH SERPL-MCNC: 11.51 UIU/ML — HIGH (ref 0.27–4.2)
UROBILINOGEN FLD QL: SIGNIFICANT CHANGE UP
UUN UR-MCNC: 332.7 MG/DL — SIGNIFICANT CHANGE UP
VARIANT LYMPHS # BLD: 1.7 % — SIGNIFICANT CHANGE UP (ref 0–6)
WBC # BLD: 6.55 K/UL — SIGNIFICANT CHANGE UP (ref 3.8–10.5)
WBC # FLD AUTO: 6.55 K/UL — SIGNIFICANT CHANGE UP (ref 3.8–10.5)
WBC UR QL: SIGNIFICANT CHANGE UP /HPF (ref 0–5)

## 2023-02-25 PROCEDURE — 99291 CRITICAL CARE FIRST HOUR: CPT

## 2023-02-25 PROCEDURE — 71045 X-RAY EXAM CHEST 1 VIEW: CPT | Mod: 26

## 2023-02-25 PROCEDURE — 99223 1ST HOSP IP/OBS HIGH 75: CPT

## 2023-02-25 PROCEDURE — 99233 SBSQ HOSP IP/OBS HIGH 50: CPT | Mod: GC

## 2023-02-25 RX ORDER — INSULIN LISPRO 100/ML
VIAL (ML) SUBCUTANEOUS AT BEDTIME
Refills: 0 | Status: DISCONTINUED | OUTPATIENT
Start: 2023-02-25 | End: 2023-03-01

## 2023-02-25 RX ORDER — SODIUM ZIRCONIUM CYCLOSILICATE 10 G/10G
10 POWDER, FOR SUSPENSION ORAL ONCE
Refills: 0 | Status: COMPLETED | OUTPATIENT
Start: 2023-02-25 | End: 2023-02-25

## 2023-02-25 RX ORDER — LEVOTHYROXINE SODIUM 125 MCG
88 TABLET ORAL AT BEDTIME
Refills: 0 | Status: DISCONTINUED | OUTPATIENT
Start: 2023-02-25 | End: 2023-02-25

## 2023-02-25 RX ORDER — INSULIN HUMAN 100 [IU]/ML
5 INJECTION, SOLUTION SUBCUTANEOUS ONCE
Refills: 0 | Status: COMPLETED | OUTPATIENT
Start: 2023-02-25 | End: 2023-02-25

## 2023-02-25 RX ORDER — DEXTROSE 50 % IN WATER 50 %
25 SYRINGE (ML) INTRAVENOUS ONCE
Refills: 0 | Status: DISCONTINUED | OUTPATIENT
Start: 2023-02-25 | End: 2023-03-01

## 2023-02-25 RX ORDER — DEXTROSE 50 % IN WATER 50 %
100 SYRINGE (ML) INTRAVENOUS ONCE
Refills: 0 | Status: COMPLETED | OUTPATIENT
Start: 2023-02-25 | End: 2023-02-25

## 2023-02-25 RX ORDER — ATROPINE SULFATE 0.1 MG/ML
0.5 SYRINGE (ML) INJECTION ONCE
Refills: 0 | Status: COMPLETED | OUTPATIENT
Start: 2023-02-25 | End: 2023-02-25

## 2023-02-25 RX ORDER — BUMETANIDE 0.25 MG/ML
1 INJECTION INTRAMUSCULAR; INTRAVENOUS
Refills: 0 | Status: DISCONTINUED | OUTPATIENT
Start: 2023-02-26 | End: 2023-02-26

## 2023-02-25 RX ORDER — ALBUTEROL 90 UG/1
10 AEROSOL, METERED ORAL ONCE
Refills: 0 | Status: COMPLETED | OUTPATIENT
Start: 2023-02-25 | End: 2023-02-25

## 2023-02-25 RX ORDER — ALBUTEROL 90 UG/1
10 AEROSOL, METERED ORAL ONCE
Refills: 0 | Status: DISCONTINUED | OUTPATIENT
Start: 2023-02-25 | End: 2023-02-25

## 2023-02-25 RX ORDER — INFLUENZA VIRUS VACCINE 15; 15; 15; 15 UG/.5ML; UG/.5ML; UG/.5ML; UG/.5ML
0.5 SUSPENSION INTRAMUSCULAR ONCE
Refills: 0 | Status: DISCONTINUED | OUTPATIENT
Start: 2023-02-25 | End: 2023-03-01

## 2023-02-25 RX ORDER — HYDROCORTISONE 20 MG
50 TABLET ORAL EVERY 6 HOURS
Refills: 0 | Status: DISCONTINUED | OUTPATIENT
Start: 2023-02-25 | End: 2023-02-25

## 2023-02-25 RX ORDER — EPINEPHRINE 0.3 MG/.3ML
0.2 INJECTION INTRAMUSCULAR; SUBCUTANEOUS
Qty: 4 | Refills: 0 | Status: DISCONTINUED | OUTPATIENT
Start: 2023-02-25 | End: 2023-02-25

## 2023-02-25 RX ORDER — CALCIUM GLUCONATE 100 MG/ML
2 VIAL (ML) INTRAVENOUS ONCE
Refills: 0 | Status: COMPLETED | OUTPATIENT
Start: 2023-02-25 | End: 2023-02-25

## 2023-02-25 RX ORDER — SODIUM CHLORIDE 9 MG/ML
500 INJECTION INTRAMUSCULAR; INTRAVENOUS; SUBCUTANEOUS ONCE
Refills: 0 | Status: COMPLETED | OUTPATIENT
Start: 2023-02-25 | End: 2023-02-25

## 2023-02-25 RX ORDER — LEVOTHYROXINE SODIUM 125 MCG
300 TABLET ORAL ONCE
Refills: 0 | Status: COMPLETED | OUTPATIENT
Start: 2023-02-25 | End: 2023-02-25

## 2023-02-25 RX ORDER — BUMETANIDE 0.25 MG/ML
1 INJECTION INTRAMUSCULAR; INTRAVENOUS ONCE
Refills: 0 | Status: COMPLETED | OUTPATIENT
Start: 2023-02-25 | End: 2023-02-25

## 2023-02-25 RX ORDER — EPINEPHRINE 0.3 MG/.3ML
0.1 INJECTION INTRAMUSCULAR; SUBCUTANEOUS
Qty: 4 | Refills: 0 | Status: DISCONTINUED | OUTPATIENT
Start: 2023-02-25 | End: 2023-02-25

## 2023-02-25 RX ORDER — HEPARIN SODIUM 5000 [USP'U]/ML
5000 INJECTION INTRAVENOUS; SUBCUTANEOUS EVERY 8 HOURS
Refills: 0 | Status: DISCONTINUED | OUTPATIENT
Start: 2023-02-25 | End: 2023-03-01

## 2023-02-25 RX ORDER — HYDROCORTISONE 20 MG
100 TABLET ORAL ONCE
Refills: 0 | Status: COMPLETED | OUTPATIENT
Start: 2023-02-25 | End: 2023-02-25

## 2023-02-25 RX ORDER — LEVOTHYROXINE SODIUM 125 MCG
94 TABLET ORAL AT BEDTIME
Refills: 0 | Status: DISCONTINUED | OUTPATIENT
Start: 2023-02-25 | End: 2023-02-27

## 2023-02-25 RX ORDER — SODIUM CHLORIDE 9 MG/ML
1000 INJECTION INTRAMUSCULAR; INTRAVENOUS; SUBCUTANEOUS
Refills: 0 | Status: DISCONTINUED | OUTPATIENT
Start: 2023-02-25 | End: 2023-02-25

## 2023-02-25 RX ORDER — DEXTROSE 50 % IN WATER 50 %
50 SYRINGE (ML) INTRAVENOUS ONCE
Refills: 0 | Status: COMPLETED | OUTPATIENT
Start: 2023-02-25 | End: 2023-02-25

## 2023-02-25 RX ORDER — INSULIN LISPRO 100/ML
VIAL (ML) SUBCUTANEOUS
Refills: 0 | Status: DISCONTINUED | OUTPATIENT
Start: 2023-02-25 | End: 2023-03-01

## 2023-02-25 RX ORDER — PANTOPRAZOLE SODIUM 20 MG/1
40 TABLET, DELAYED RELEASE ORAL
Refills: 0 | Status: DISCONTINUED | OUTPATIENT
Start: 2023-02-25 | End: 2023-03-01

## 2023-02-25 RX ORDER — INSULIN HUMAN 100 [IU]/ML
10 INJECTION, SOLUTION SUBCUTANEOUS ONCE
Refills: 0 | Status: COMPLETED | OUTPATIENT
Start: 2023-02-25 | End: 2023-02-25

## 2023-02-25 RX ORDER — GLUCAGON INJECTION, SOLUTION 0.5 MG/.1ML
1 INJECTION, SOLUTION SUBCUTANEOUS ONCE
Refills: 0 | Status: DISCONTINUED | OUTPATIENT
Start: 2023-02-25 | End: 2023-03-01

## 2023-02-25 RX ORDER — DEXTROSE 50 % IN WATER 50 %
12.5 SYRINGE (ML) INTRAVENOUS ONCE
Refills: 0 | Status: DISCONTINUED | OUTPATIENT
Start: 2023-02-25 | End: 2023-03-01

## 2023-02-25 RX ORDER — DEXTROSE 50 % IN WATER 50 %
15 SYRINGE (ML) INTRAVENOUS ONCE
Refills: 0 | Status: DISCONTINUED | OUTPATIENT
Start: 2023-02-25 | End: 2023-03-01

## 2023-02-25 RX ORDER — INSULIN GLARGINE 100 [IU]/ML
3 INJECTION, SOLUTION SUBCUTANEOUS AT BEDTIME
Refills: 0 | Status: DISCONTINUED | OUTPATIENT
Start: 2023-02-25 | End: 2023-02-26

## 2023-02-25 RX ADMIN — ALBUTEROL 10 MILLIGRAM(S): 90 AEROSOL, METERED ORAL at 12:28

## 2023-02-25 RX ADMIN — INSULIN GLARGINE 3 UNIT(S): 100 INJECTION, SOLUTION SUBCUTANEOUS at 22:07

## 2023-02-25 RX ADMIN — Medication 100 MILLILITER(S): at 19:46

## 2023-02-25 RX ADMIN — Medication 50 MILLILITER(S): at 11:47

## 2023-02-25 RX ADMIN — SODIUM ZIRCONIUM CYCLOSILICATE 10 GRAM(S): 10 POWDER, FOR SUSPENSION ORAL at 19:50

## 2023-02-25 RX ADMIN — INSULIN HUMAN 10 UNIT(S): 100 INJECTION, SOLUTION SUBCUTANEOUS at 19:51

## 2023-02-25 RX ADMIN — ALBUTEROL 10 MILLIGRAM(S): 90 AEROSOL, METERED ORAL at 19:54

## 2023-02-25 RX ADMIN — SODIUM CHLORIDE 500 MILLILITER(S): 9 INJECTION INTRAMUSCULAR; INTRAVENOUS; SUBCUTANEOUS at 10:53

## 2023-02-25 RX ADMIN — HEPARIN SODIUM 5000 UNIT(S): 5000 INJECTION INTRAVENOUS; SUBCUTANEOUS at 22:38

## 2023-02-25 RX ADMIN — SODIUM ZIRCONIUM CYCLOSILICATE 10 GRAM(S): 10 POWDER, FOR SUSPENSION ORAL at 11:48

## 2023-02-25 RX ADMIN — Medication 100 MILLIGRAM(S): at 10:55

## 2023-02-25 RX ADMIN — Medication 94 MICROGRAM(S): at 22:38

## 2023-02-25 RX ADMIN — Medication 200 GRAM(S): at 11:00

## 2023-02-25 RX ADMIN — EPINEPHRINE 45 MICROGRAM(S)/KG/MIN: 0.3 INJECTION INTRAMUSCULAR; SUBCUTANEOUS at 11:18

## 2023-02-25 RX ADMIN — BUMETANIDE 1 MILLIGRAM(S): 0.25 INJECTION INTRAMUSCULAR; INTRAVENOUS at 18:12

## 2023-02-25 RX ADMIN — INSULIN HUMAN 5 UNIT(S): 100 INJECTION, SOLUTION SUBCUTANEOUS at 11:47

## 2023-02-25 RX ADMIN — Medication 200 GRAM(S): at 16:01

## 2023-02-25 RX ADMIN — EPINEPHRINE 22.5 MICROGRAM(S)/KG/MIN: 0.3 INJECTION INTRAMUSCULAR; SUBCUTANEOUS at 10:59

## 2023-02-25 RX ADMIN — Medication 0.5 MILLIGRAM(S): at 10:00

## 2023-02-25 RX ADMIN — Medication 2: at 21:11

## 2023-02-25 RX ADMIN — Medication 300 MICROGRAM(S): at 11:37

## 2023-02-25 RX ADMIN — Medication 200 GRAM(S): at 19:42

## 2023-02-25 NOTE — H&P ADULT - PROBLEM SELECTOR PLAN 10
hgb 7.2 at admission  No signs of bleeding   stable from prior, likely in setting of ACD/CKD  microcytic  -check iron panel  -active T&S hgb 7.2 at admission  No signs of bleeding   stable from prior, likely in setting of ACD/CKD  microcytic  -check iron panel  -active T&S    Thrombocytopenia  stable from prior  -trend

## 2023-02-25 NOTE — H&P ADULT - NSHPLABSRESULTS_GEN_ALL_CORE
LABS:  cret                        7.2    6.55  )-----------( 73       ( 25 Feb 2023 10:28 )             21.9     02-25    126<L>  |  94<L>  |  114<H>  ----------------------------<  139<H>  5.5<H>   |  16<L>  |  2.41<H>    Ca    9.9      25 Feb 2023 14:13  Phos  4.9     02-25  Mg     2.20     02-25    TPro  7.3  /  Alb  4.0  /  TBili  0.6  /  DBili  x   /  AST  137<H>  /  ALT  167<H>  /  AlkPhos  1137<H>  02-25    PT/INR - ( 25 Feb 2023 10:28 )   PT: 13.3 sec;   INR: 1.14 ratio         PTT - ( 25 Feb 2023 10:28 )  PTT:43.4 sec

## 2023-02-25 NOTE — H&P ADULT - ATTENDING COMMENTS
58 y/o F with pmhx of hypothyroidism, HTN, T2DM, HLD, CKD, PAH, asthma who presents for generalized  weakness and chest pain.   On presentation to ED, patient noted to have severe bradycardia to 28, hypothermia to 90.5. received atropine x1 with minimal improvement and was started on epinephrine gtt for approximate 5 hours with gradually improvement in HR to 50s. Patient also initiated with hypothermia protocol and placed on yudy hugger with improvement to 96.8F. Labs notable for acute on chronic hyponatremia to 121 with hyperkalemia to 5.9. Received 500cc bolus in ED in addition to hyperkalemia protocol with improvement of K to 5.5  EKG personally reviewed. HR 28 sinus bradycardia with 1st degree AVB.      #Myxedema coma  - patient presents with fatigue, vague chest pain, found to have severe bradycardia with associated electrolyte abnormalities, with high suspicion for myxedema coma   - TSH elevated to 11.56 with T4 5.92, received LTX load of 300mg x1 and hydrocortisone load 100mg   - family states patient has been complaint with 125 mcg LTX at home  - MICU consulted, deemed not a MICU candidate   - Low Threshold to reconsult MICU should patient decompensate (has been intubated and on vasopressors at hospitalization in OSH for myxedema coma)   - cortisol level elevated 25,8, low suspicion for concurrent adrenal crisis, so no need for further steroids at this time   - endo consulted recommending 94 mcg IV qd, will formally leave consult in AM   - received hyperK cocktail x2 , K still elevated to 6.3 will consult nephrology for further recs, will trend CMP q6hrs    - VS q4 hours    #Transaminitis   - AST//167 with ALP 1137  - Acute hepatitis panel   - RUQ U/S pending   - trend CMP    #Anemia  - currently near baseline,  - maintain active T&S, transfuse for Hgb <7

## 2023-02-25 NOTE — ED PROVIDER NOTE - PHYSICAL EXAMINATION
General: WN/WD NAD  Head: Atraumatic, normocephalic  Eyes: EOM grossly in tact, no scleral icterus, no discharge  ENT: dry mucous membranes  Neurology: A&Ox3, nonfocal, MARTNIEZ x 4  Respiratory: CTAB, no wheezing, normal respiratory effort  CV: bradycardic to 20's. Extremities warm and well perfused  Abdominal: Soft, non-distended, non-tender, no masses  Extremities: No edema, no deformities  Skin: warm and dry. No rashes

## 2023-02-25 NOTE — ED PROVIDER NOTE - CHRONIC CONDITION OTHER
myxedema coma (hospitalized from 12/16/22 to 1/4/23, requiring intubation), hypothyroidism, hypertension, diabetes, hyperlipidemia, CKD, pulmonary hypertension, asthma on albuterol presented to the ED w/ respiratory distress

## 2023-02-25 NOTE — H&P ADULT - HISTORY OF PRESENT ILLNESS
56 yo F w/ hypothyroidism, HTN, T2DM, HLD, CKD, PAH, asthma on albuterol presented to the ED w/ c/o weakness and R sided CP. Pt with recent admission for myxedema coma requiring intubation and vasopressors. Pt on arrival to ED bradycardic to 20's. Pt complaining of sharp R sided CP for 2-3 days, confirmed with daughter as patient is a poor historian. As per daughter, patient has been having pleuritic left-sided lower chest pain. Pt has apparently been drinking more than she should and daughter is worried about fluid overload. Noted pt was complaining of excessive sweating this morning. The daughter and son administer all medications but the patient is otherwise fairly independent in ADLs. Notably, patient has apparently had HR in 40s consistently since last hospitalization (and has been receiving labetalol at home). No fever, cough, sore throat, dyspnea.     In ED, patient received one dose of atropine with no effect and then placed on epi drip. After bairhugger placed for temp of 90.5, temp came up and patient's HR also responded, now in 50s. Ekg w/ sinus bradycardia and first degree AV block. No ischemic changes or hyperkalemic changes noted. Na 121 and K of 5.9 on presentation. Given albuterol, insulin 5 + D50, lokelma. Repeat K 5.5.  56 yo F w/ hypothyroidism, HTN, T2DM, HLD, CKD, PAH, asthma on albuterol presented to the ED w/ c/o weakness and R sided CP. Pt with recent admission for myxedema coma requiring intubation and vasopressors. Pt on arrival to ED bradycardic to 20's. Pt complaining of sharp L sided CP for 2-3 days, confirmed with daughter as patient is a poor historian. As per daughter, patient has been having pleuritic left-sided lower chest pain. Pt has apparently been drinking more than she should and daughter is worried about fluid overload. Noted pt was complaining of excessive sweating this morning. The daughter and son administer all medications but the patient is otherwise fairly independent in ADLs. Notably, patient has apparently had HR in 40s consistently since last hospitalization (and has been receiving labetalol at home). No fever, cough, sore throat, dyspnea.     In ED, patient received one dose of atropine with no effect and then placed on epi drip. After bairhugger placed for temp of 90.5, temp came up and patient's HR also responded, now in 50s. Ekg w/ sinus bradycardia and first degree AV block. No ischemic changes or hyperkalemic changes noted. Na 121 and K of 5.9 on presentation. Given albuterol, insulin 5 + D50, lokelma. Repeat K 5.5.

## 2023-02-25 NOTE — H&P ADULT - PROBLEM SELECTOR PLAN 8
On Levemir 3 at bedtime and prandin 1 tid   last A1C 6.9% in Jan 23  -check A1c  -low dose ISS  -FS qAC and qHS On Levemir 3 at bedtime and prandin 1 tid   last A1C 6.9% in Jan 23  -check A1c  -c/w lantus 3  -low dose ISS  -FS qAC and qHS

## 2023-02-25 NOTE — PATIENT PROFILE ADULT - FALL HARM RISK - HARM RISK INTERVENTIONS
Assistance with ambulation/Assistance OOB with selected safe patient handling equipment/Communicate Risk of Fall with Harm to all staff/Discuss with provider need for PT consult/Monitor gait and stability/Provide patient with walking aids - walker, cane, crutches/Reinforce activity limits and safety measures with patient and family/Review medications for side effects contributing to fall risk/Sit up slowly, dangle for a short time, stand at bedside before walking/Tailored Fall Risk Interventions/Use of alarms - bed, chair and/or voice tab/Visual Cue: Yellow wristband and red socks/Bed in lowest position, wheels locked, appropriate side rails in place/Call bell, personal items and telephone in reach/Instruct patient to call for assistance before getting out of bed or chair/Non-slip footwear when patient is out of bed/[place] to call system/Physically safe environment - no spills, clutter or unnecessary equipment/Purposeful Proactive Rounding/Room/bathroom lighting operational, light cord in reach

## 2023-02-25 NOTE — ED ADULT NURSE NOTE - OBJECTIVE STATEMENT
Pt received to rm 8 presents with CP and SOB. Pt a&ox2 upon initial assessment, bradycardic to high 20s on CM. Pt respirations even and unlabored. 20G Iv placed in left arm, 18G Iv placed in rt arm, labs drawn and sent. Atropine administered per MD orders. Pt initiated on epi drop beginning at 0.1mcg. Will await further orders and continue to monitor.

## 2023-02-25 NOTE — ED ADULT NURSE REASSESSMENT NOTE - NS ED NURSE REASSESS COMMENT FT1
Mobile Critical Care RN: patient is 57/F presenting with chest pain, PMHx of hypothyroidism, HTN, DM2, HLD, CKD, PAH, asthma. patient recently admitted for myxedema coma requiring intubation/vasopressors. patient was bradycardic on arrival to 20s, hypothermic. patient placed on yudy hugger warming blanket and epinephrine gtt initiated. Goal for HR above 40, hypertension to  okay as per MD Brian. patient also found to be hyperkalemic and meds given as ordered. repeat CMP sent to lab. patient remains in room 8 at this time, alert and oriented x3, temperature reassessed, and maintained on yudy hugger blanket. comfortable on room air, no complaints of SOB or chest pain. bradycardic to 47 at this time, -160. PIV x2. tolerating PO with no complaints of nausea/vomiting/diarrhea. patient voids spontaneously and primafit in place, UA/UCx sent. skin with areas of hyperpigmentation on lower abdomen and lower extremities. MICU to assess.

## 2023-02-25 NOTE — H&P ADULT - PROBLEM SELECTOR PLAN 6
Last echo in Jan 2023 EF 61%. Mild diastolic dysfunction. RV enlargement and RV systolic dysfunction. Mod pulm HTN  BNP 2919, higher than prior hospitalizations  CXR w/ mild pulm congestion. POCUS w/ some B lines. Lung exam unremarkable  Transaminitis 2/2 to congestive hepatopathy?  -repeat TTE  -tx as acute exacerbation will give bumex IV 1 and restart home regimen (bumex 1 PO bid) Last echo in Jan 2023 EF 61%. Mild diastolic dysfunction. RV enlargement and RV systolic dysfunction. Mod pulm HTN  BNP 2919, higher than prior hospitalizations  CXR w/ mild pulm congestion. POCUS w/ some B lines. Lung exam unremarkable  Transaminitis 2/2 to congestive hepatopathy?  -repeat TTE  -tx as acute exacerbation will give bumex IV 1 and restart home regimen (bumex 1 PO bid)  -strict I/Os  -daily weights Last echo in Jan 2023 EF 61%. Mild diastolic dysfunction. RV enlargement and RV systolic dysfunction. Mod pulm HTN  BNP 2919, higher than prior hospitalizations  CXR w/ mild pulm congestion. POCUS w/ some B lines. Lung exam unremarkable  Transaminitis 2/2 to congestive hepatopathy?  -repeat TTE  -tx as acute exacerbation will give bumex IV 1 and restart home regimen (bumex 1 PO bid)  -strict I/Os  -daily weights    #chest pain  pleuritic chest pain past few days in left side of chest under breast, not reproducible on palpation  Ekg w/o ischemic changes  no hypoxia or tachycardia to suggest PE  uremic pericarditis on ddx ?  Trop 46  -trend trop to peak  -tele monitoring Last echo in Jan 2023 EF 61%. Mild diastolic dysfunction. RV enlargement and RV systolic dysfunction. Mod pulm HTN  BNP 2919, higher than prior hospitalizations  CXR w/ mild pulm congestion. POCUS w/ some B lines. Lung exam unremarkable  Transaminitis 2/2 to congestive hepatopathy?  gave one dose   - repeat TTE  - strict I/Os  - daily weights    #chest pain  pleuritic chest pain past few days in left side of chest under breast, not reproducible on palpation  Ekg w/o ischemic changes  no hypoxia or tachycardia to suggest PE  uremic pericarditis on ddx ?  Trop 46  -trend trop to peak  -tele monitoring

## 2023-02-25 NOTE — ED PROVIDER NOTE - OBJECTIVE STATEMENT
58 yo F w/ hypothyroidism, HTN, T2DM, HLD, CKD, PAH, asthma on albuterol presented to the ED w/ c/o weakness and R sided CP. Pt with recent admission for myxedema coma requiring intubation and vasopressors. Pt On arrival to ED bradycardic to 20's. Pt complaining of sharp R sided CP for 2-3 days. No fever, cough, sore throat, dyspnea.     Hx obtained patient's daughter Brittany : Pt complaining of fatigue. They were worried about her being volume overloaded. Pt with remote hx of vomiting 2 weeks ago, but this week she has not been vomiting. This morning pt was complaining of diaphoresis. This morning when they checked her BP her HR was 37. Her daughter and son have been giving her her medications judiciously after her hospitalizations. No known sick contacts.

## 2023-02-25 NOTE — ED PROVIDER NOTE - PROGRESS NOTE DETAILS
Jillian Pereyra, PGY-2, EM: Pt arrived bradycardic to 20's. Initially given bradycardia put in atropine

## 2023-02-25 NOTE — H&P ADULT - PROBLEM SELECTOR PLAN 2
has been chronically ravinder at home since last admission, perhaps precipitated by poorly controlled hypothyroidism (though as per daughter has been taking medications) and use of labetalol at home  now in 50s  Ekg sinus ravinder w/ first degree AV block  responded to tx of hypothermia  -manage hypothyroidism  -c/w bearhugger  -hold BB  -if ravinder < 40 address reversible causes and then can consider atropine -> consider EP/MICU eval; MICU suggesting dopamine infusion if ravinder  if unstable, ACLS  -tele monitoring has been chronically ravinder at home since last admission, perhaps precipitated by poorly controlled hypothyroidism (though as per daughter has been taking medications) and use of labetalol at home  now in 50s  Ekg sinus ravinder w/ first degree AV block  responded to tx of hypothermia  -manage hypothyroidism  -c/w bearhugger  -hold BB  -if ravinder < 40 address reversible causes and then can consider atropine -> consider EP/MICU eval; MICU suggesting dopamine infusion if ravinder  if unstable, ACLS  -tele monitoring    #Hypothermia  ~90 on admission now normothermic s/p blanket  Unclear why patient so hypothermic; manifestation of myxedema coma?  possible infection but no leukocytosis or infectious symptoms. few bacteria on U/A but no other findings on U/A suggestive of infection or patient/family noting urinary symptoms  -bear hugger  -endo recs  -f/u BCx drawn in ED has been chronically ravinder at home since last admission, perhaps precipitated by poorly controlled hypothyroidism (though as per daughter has been taking medications) and use of labetalol at home  now in 50s  Ekg sinus ravinder w/ first degree AV block  responded to tx of hypothermia  -manage hypothyroidism  -c/w bearhugger  -hold BB  -if ravinder < 40 address reversible causes and then can consider atropine -> consider EP/MICU eval; MICU suggesting dopamine infusion if ravinder  if unstable, ACLS  -tele monitoring    #Hypothermia  ~90 on admission now normothermic s/p blanket  Unclear why patient so hypothermic; manifestation of myxedema coma?  possible infection but no leukocytosis or infectious symptoms. few bacteria on U/A but no other findings on U/A suggestive of infection or patient/family noting urinary symptoms  -bear roby  -endo recs  -f/u BCx drawn in ED  -monitor off Abx

## 2023-02-25 NOTE — CONSULT NOTE ADULT - SUBJECTIVE AND OBJECTIVE BOX
CHIEF COMPLAINT: chest pain    ED HPI: "56 yo F w/ hypothyroidism, HTN, T2DM, HLD, CKD, PAH, asthma on albuterol presented to the ED w/ c/o weakness and R sided CP. Pt with recent admission for myxedema coma requiring intubation and vasopressors. Pt On arrival to ED bradycardic to 20's. Pt complaining of sharp R sided CP for 2-3 days. No fever, cough, sore throat, dyspnea.     	Hx obtained patient's daughter Brittany Oconnell : Pt complaining of fatigue. They were worried about her being volume overloaded. Pt with remote hx of vomiting 2 weeks ago, but this week she has not been vomiting. This morning pt was complaining of diaphoresis. This morning when they checked her BP her HR was 37. Her daughter and son have been giving her her medications judiciously after her hospitalizations. No known sick contacts."    PAST MEDICAL & SURGICAL HISTORY:  HTN (hypertension)    HLD (hyperlipidemia)    DM (diabetes mellitus)    Hypothyroid    H/O pulmonary hypertension    CKD (chronic kidney disease)    No significant past surgical history      FAMILY HISTORY:      SOCIAL HISTORY:  unable to obtain given mental status    Allergies    No Known Allergies    Intolerances        HOME MEDICATIONS:    REVIEW OF SYSTEMS:  Constitutional: +weakness +fatigue +sweats  Eyes:  ENT:  CV: + Left sided chest discomfort  Resp:  GI:  :  MSK:  Integumentary:  Neurological:  Psychiatric:  Endocrine:  Hematologic/Lymphatic:  Allergic/Immunologic:  [ ] All other systems as per HPI      OBJECTIVE:  ICU Vital Signs Last 24 Hrs  T(C): 34.6 (2023 14:15), Max: 34.6 (2023 14:15)  T(F): 94.3 (2023 14:15), Max: 94.3 (2023 14:15)  HR: 50 (2023 14:45) (28 - 64)  BP: 117/51 (2023 14:45) (98/60 - 228/109)  BP(mean): 71 (2023 14:45) (71 - 132)  ABP: --  ABP(mean): --  RR: 10 (2023 14:45) (10 - 18)  SpO2: 100% (2023 14:45) (99% - 100%)    O2 Parameters below as of 2023 14:45  Patient On (Oxygen Delivery Method): room air    CAPILLARY BLOOD GLUCOSE      POCT Blood Glucose.: 196 mg/dL (2023 11:44)      PHYSICAL EXAM:  T(C): 34.6 (23 @ 14:15), Max: 34.6 (23 @ 14:15)  HR: 50 (23 @ 14:45) (28 - 64)  BP: 117/51 (23 @ 14:45) (98/60 - 228/109)  RR: 10 (23 @ 14:45) (10 - 18)  SpO2: 100% (23 @ 14:45) (99% - 100%)    PHYSICAL EXAM:  GENERAL: NAD, resting in bed  HEAD:  Atraumatic, Normocephalic  EYES: EOMI,, conjunctiva and sclera clear  ENMT: Moist mucous membranes  NECK: Supple, No JVD  CHEST/LUNG: Clear to auscultation bilaterally; No rales, rhonchi, wheezing  HEART: +bradycardic rate; No murmurs  ABDOMEN: Soft, Nontender, Nondistended; Bowel sounds present  EXTREMITIES:  2+ Peripheral Pulses, No LE edema  LYMPH: No lymphadenopathy noted  SKIN: No rashes or lesions  NERVOUS SYSTEM:  Alert & Oriented X3, non-focal    HOSPITAL MEDICATIONS:  MEDICATIONS  (STANDING):  EPINEPHrine    Infusion 0.2 MICROgram(s)/kG/Min (45 mL/Hr) IV Continuous <Continuous>  sodium chloride 0.9%. 1000 milliLiter(s) (75 mL/Hr) IV Continuous <Continuous>    MEDICATIONS  (PRN):      LABS:                        7.2    6.55  )-----------( 73       ( 2023 10:28 )             21.9     -    126<L>  |  94<L>  |  114<H>  ----------------------------<  139<H>  5.5<H>   |  16<L>  |  2.41<H>    Ca    9.9      2023 14:13  Phos  4.9       Mg     2.20         TPro  7.3  /  Alb  4.0  /  TBili  0.6  /  DBili  x   /  AST  137<H>  /  ALT  167<H>  /  AlkPhos  1137<H>      PT/INR - ( 2023 10:28 )   PT: 13.3 sec;   INR: 1.14 ratio         PTT - ( 2023 10:28 )  PTT:43.4 sec  Urinalysis Basic - ( 2023 12:20 )    Color: Light Yellow / Appearance: Clear / S.007 / pH: x  Gluc: x / Ketone: Negative  / Bili: Negative / Urobili: <2 mg/dL   Blood: x / Protein: 30 mg/dL / Nitrite: Negative   Leuk Esterase: Negative / RBC: 2-5 /HPF / WBC 1-3 /HPF   Sq Epi: x / Non Sq Epi: Few / Bacteria: Occasional      Venous Blood Gas:   @ 10:07  7.23/39/77/16/96.0  VBG Lactate: 1.5      MICROBIOLOGY:     RADIOLOGY:  [ ] Reviewed and interpreted by me    EKG: CHIEF COMPLAINT: chest pain    HPI: Patient is a 57F w/ hypothyroidism, HTN, T2DM, HLD, CKD, PAH, asthma on albuterol presented to the ED w/ c/o weakness and left sided chest pain. Pt with recent admission for myxedema coma requiring intubation and vasopressors. She reports she felt weakness and sweats for one day prior to presentation. Reports a 2 day history of left sided chest pain worse with inspiration. Denies fever, cough, shortness of breath or abdominal pain. On arrival to ED bradycardic to 20's and hypothermic to T=90.5F    PAST MEDICAL & SURGICAL HISTORY:  HTN (hypertension)    HLD (hyperlipidemia)    DM (diabetes mellitus)    Hypothyroid    H/O pulmonary hypertension    CKD (chronic kidney disease)    No significant past surgical history      FAMILY HISTORY:      SOCIAL HISTORY:  Denies tobacco, alcohol,     Allergies    No Known Allergies    Intolerances        HOME MEDICATIONS:    REVIEW OF SYSTEMS:  Constitutional: +weakness +fatigue +sweats  Eyes:  ENT:  CV: + Left sided chest discomfort  Resp:  GI:  :  MSK:  Integumentary:  Neurological:  Psychiatric:  Endocrine:  Hematologic/Lymphatic:  Allergic/Immunologic:  [ ] All other systems as per HPI      OBJECTIVE:  ICU Vital Signs Last 24 Hrs  T(C): 34.6 (2023 14:15), Max: 34.6 (2023 14:15)  T(F): 94.3 (2023 14:15), Max: 94.3 (2023 14:15)  HR: 50 (2023 14:45) (28 - 64)  BP: 117/51 (2023 14:45) (98/60 - 228/109)  BP(mean): 71 (2023 14:45) (71 - 132)  ABP: --  ABP(mean): --  RR: 10 (2023 14:45) (10 - 18)  SpO2: 100% (2023 14:45) (99% - 100%)    O2 Parameters below as of 2023 14:45  Patient On (Oxygen Delivery Method): room air    CAPILLARY BLOOD GLUCOSE      POCT Blood Glucose.: 196 mg/dL (2023 11:44)      PHYSICAL EXAM:  T(C): 34.6 (23 @ 14:15), Max: 34.6 (23 @ 14:15)  HR: 50 (23 @ 14:45) (28 - 64)  BP: 117/51 (23 @ 14:45) (98/60 - 228/109)  RR: 10 (23 @ 14:45) (10 - 18)  SpO2: 100% (23 @ 14:45) (99% - 100%)    PHYSICAL EXAM:  GENERAL: NAD, resting in bed  HEAD:  Atraumatic, Normocephalic  EYES: EOMI,, conjunctiva and sclera clear  ENMT: Moist mucous membranes  NECK: Supple, No JVD  CHEST/LUNG: Clear to auscultation bilaterally; No rales, rhonchi, wheezing  HEART: +bradycardic rate; No murmurs  ABDOMEN: Soft, Nontender, Nondistended; Bowel sounds present  EXTREMITIES:  2+ Peripheral Pulses, No LE edema  LYMPH: No lymphadenopathy noted  SKIN: No rashes or lesions  NERVOUS SYSTEM:  Alert & Oriented X3, non-focal    HOSPITAL MEDICATIONS:  MEDICATIONS  (STANDING):  EPINEPHrine    Infusion 0.2 MICROgram(s)/kG/Min (45 mL/Hr) IV Continuous <Continuous>  sodium chloride 0.9%. 1000 milliLiter(s) (75 mL/Hr) IV Continuous <Continuous>    MEDICATIONS  (PRN):      LABS:                        7.2    6.55  )-----------( 73       ( 2023 10:28 )             21.9         126<L>  |  94<L>  |  114<H>  ----------------------------<  139<H>  5.5<H>   |  16<L>  |  2.41<H>    Ca    9.9      2023 14:13  Phos  4.9       Mg     2.20         TPro  7.3  /  Alb  4.0  /  TBili  0.6  /  DBili  x   /  AST  137<H>  /  ALT  167<H>  /  AlkPhos  1137<H>      PT/INR - ( 2023 10:28 )   PT: 13.3 sec;   INR: 1.14 ratio         PTT - ( 2023 10:28 )  PTT:43.4 sec  Urinalysis Basic - ( 2023 12:20 )    Color: Light Yellow / Appearance: Clear / S.007 / pH: x  Gluc: x / Ketone: Negative  / Bili: Negative / Urobili: <2 mg/dL   Blood: x / Protein: 30 mg/dL / Nitrite: Negative   Leuk Esterase: Negative / RBC: 2-5 /HPF / WBC 1-3 /HPF   Sq Epi: x / Non Sq Epi: Few / Bacteria: Occasional      Venous Blood Gas:   @ 10:07  7.23/39/77/16/96.0  VBG Lactate: 1.5      MICROBIOLOGY:     RADIOLOGY:  [ ] Reviewed and interpreted by me    EKG: CHIEF COMPLAINT: chest pain    HPI: Patient is a 57F w/ hypothyroidism, HTN, T2DM, HLD, CKD, PAH, asthma on albuterol presented to the ED w/ c/o weakness and left sided chest pain. Pt with recent admission for myxedema coma requiring intubation and vasopressors. She reports she felt weakness and sweats for one day prior to presentation. Reports a 2 day history of left sided chest pain worse with inspiration. Denies fever, cough, shortness of breath or abdominal pain. On arrival to ED bradycardic to 20's and hypothermic to T=90.5F    PAST MEDICAL & SURGICAL HISTORY:  HTN (hypertension)    HLD (hyperlipidemia)    DM (diabetes mellitus)    Hypothyroid    H/O pulmonary hypertension    CKD (chronic kidney disease)    No significant past surgical history      FAMILY HISTORY:      SOCIAL HISTORY:  Denies tobacco, alcohol, drug use    Allergies    No Known Allergies    Intolerances        HOME MEDICATIONS:    REVIEW OF SYSTEMS:  Constitutional: +weakness +fatigue +sweats  Eyes:  ENT:  CV: + Left sided chest discomfort  Resp:  GI:  :  MSK:  Integumentary:  Neurological:  Psychiatric:  Endocrine:   Hematologic/Lymphatic:  Allergic/Immunologic:  [ ] All other systems as per HPI      OBJECTIVE:  ICU Vital Signs Last 24 Hrs  T(C): 34.6 (2023 14:15), Max: 34.6 (2023 14:15)  T(F): 94.3 (2023 14:15), Max: 94.3 (2023 14:15)  HR: 50 (2023 14:45) (28 - 64)  BP: 117/51 (2023 14:45) (98/60 - 228/109)  BP(mean): 71 (2023 14:45) (71 - 132)  ABP: --  ABP(mean): --  RR: 10 (2023 14:45) (10 - 18)  SpO2: 100% (2023 14:45) (99% - 100%)    O2 Parameters below as of 2023 14:45  Patient On (Oxygen Delivery Method): room air    CAPILLARY BLOOD GLUCOSE      POCT Blood Glucose.: 196 mg/dL (2023 11:44)      PHYSICAL EXAM:  T(C): 34.6 (23 @ 14:15), Max: 34.6 (23 @ 14:15)  HR: 50 (23 @ 14:45) (28 - 64)  BP: 117/51 (23 @ 14:45) (98/60 - 228/109)  RR: 10 (23 @ 14:45) (10 - 18)  SpO2: 100% (23 @ 14:45) (99% - 100%)    PHYSICAL EXAM:  GENERAL: NAD, resting in bed  HEAD:  Atraumatic, Normocephalic  EYES: EOMI,, conjunctiva and sclera clear  ENMT: Moist mucous membranes  NECK: Supple, No JVD  CHEST/LUNG: Clear to auscultation bilaterally; No rales, rhonchi, wheezing  HEART: +bradycardic rate; No murmurs  ABDOMEN: Soft, Nontender, Nondistended; Bowel sounds present  EXTREMITIES:  2+ Peripheral Pulses, +trace LE edema  LYMPH: No lymphadenopathy noted  SKIN: No rashes or lesions  NERVOUS SYSTEM:  Alert & Oriented X3, non-focal    HOSPITAL MEDICATIONS:  MEDICATIONS  (STANDING):  EPINEPHrine    Infusion 0.2 MICROgram(s)/kG/Min (45 mL/Hr) IV Continuous <Continuous>  sodium chloride 0.9%. 1000 milliLiter(s) (75 mL/Hr) IV Continuous <Continuous>    MEDICATIONS  (PRN):      LABS:                        7.2    6.55  )-----------( 73       ( 2023 10:28 )             21.9         126<L>  |  94<L>  |  114<H>  ----------------------------<  139<H>  5.5<H>   |  16<L>  |  2.41<H>    Ca    9.9      2023 14:13  Phos  4.9       Mg     2.20         TPro  7.3  /  Alb  4.0  /  TBili  0.6  /  DBili  x   /  AST  137<H>  /  ALT  167<H>  /  AlkPhos  1137<H>      PT/INR - ( 2023 10:28 )   PT: 13.3 sec;   INR: 1.14 ratio         PTT - ( 2023 10:28 )  PTT:43.4 sec  Urinalysis Basic - ( 2023 12:20 )    Color: Light Yellow / Appearance: Clear / S.007 / pH: x  Gluc: x / Ketone: Negative  / Bili: Negative / Urobili: <2 mg/dL   Blood: x / Protein: 30 mg/dL / Nitrite: Negative   Leuk Esterase: Negative / RBC: 2-5 /HPF / WBC 1-3 /HPF   Sq Epi: x / Non Sq Epi: Few / Bacteria: Occasional      Venous Blood Gas:   @ 10:07  7.23/39/77/16/96.0  VBG Lactate: 1.5      MICROBIOLOGY:     RADIOLOGY:  [ ] Reviewed and interpreted by me    EKG:

## 2023-02-25 NOTE — ED PROVIDER NOTE - ATTENDING CONTRIBUTION TO CARE
I performed a history and physical exam of the patient and discussed their management with the resident and /or advanced care provider. I reviewed the resident and /or ACP's note and agree with the documented findings and plan of care. My medical decision making and observations are found above.  Lungs rales at base, awake and alert moving all 4

## 2023-02-25 NOTE — ED ADULT TRIAGE NOTE - CHIEF COMPLAINT QUOTE
Patient brought to ER by EMS from home for c/o pain on inspiration under left breast since 3 days ago. Pt also states she has no taste and no appetite. Pt has bradycardia. Pt has type 2 DM: FS: 192 Unable to obtain Temperature.

## 2023-02-25 NOTE — H&P ADULT - NSHPPHYSICALEXAM_GEN_ALL_CORE
Vital Signs Last 24 Hrs  T(C): 36 (25 Feb 2023 16:31), Max: 36 (25 Feb 2023 16:31)  T(F): 96.8 (25 Feb 2023 16:31), Max: 96.8 (25 Feb 2023 16:31)  HR: 55 (25 Feb 2023 16:31) (28 - 64)  BP: 133/61 (25 Feb 2023 16:31) (98/60 - 228/109)  BP(mean): 73 (25 Feb 2023 15:15) (70 - 132)  RR: 12 (25 Feb 2023 16:31) (10 - 18)  SpO2: 99% (25 Feb 2023 16:31) (99% - 100%)    Parameters below as of 25 Feb 2023 16:31  Patient On (Oxygen Delivery Method): room air    GENERAL APPEARANCE: NAD, sleepy but arousable  HEENT:  PERRL, EOMI. MMM  NECK: Neck supple, non-tender no lymphadenopathy, masses or thyromegaly  CARDIAC: Normal S1 and S2. Systolic murmur. ravinder normal rhythm  LUNGS: Clear to auscultation B/L, no rales, rhonchi, or wheezing appreciated  ABDOMEN: Soft , NTND, bowel sounds normal. No guarding or rebound.   MUSCULOSKELETAL: ROM intact.  No joint erythema or tenderness.   EXTREMITIES: Minimal pitting edema b/l LEs. Peripheral pulses intact.   NEUROLOGICAL: Non focal. Strength and sensation symmetric and intact throughout.   SKIN: Warm and dry , Well perfused  PSYCHIATRIC: AOx3 , Normal mood and affect Vital Signs Last 24 Hrs  T(C): 36 (25 Feb 2023 16:31), Max: 36 (25 Feb 2023 16:31)  T(F): 96.8 (25 Feb 2023 16:31), Max: 96.8 (25 Feb 2023 16:31)  HR: 55 (25 Feb 2023 16:31) (28 - 64)  BP: 133/61 (25 Feb 2023 16:31) (98/60 - 228/109)  BP(mean): 73 (25 Feb 2023 15:15) (70 - 132)  RR: 12 (25 Feb 2023 16:31) (10 - 18)  SpO2: 99% (25 Feb 2023 16:31) (99% - 100%)    Parameters below as of 25 Feb 2023 16:31  Patient On (Oxygen Delivery Method): room air    GENERAL APPEARANCE: NAD, sleepy but arousable  HEENT:  PERRL, EOMI. MMM  NECK: Neck supple, non-tender no lymphadenopathy, masses or thyromegaly  CARDIAC: Normal S1 and S2. Systolic murmur. ravinder normal rhythm  LUNGS: Clear to auscultation B/L, no rales, rhonchi, or wheezing appreciated  ABDOMEN: Soft , NTND, bowel sounds normal. No guarding or rebound.   MUSCULOSKELETAL: ROM intact.  No joint erythema or tenderness.   EXTREMITIES: Minimal pitting edema b/l LEs. Peripheral pulses intact.   NEUROLOGICAL: Non focal. Patient not fully cooperative with neuro exam but moving all extremities; appears generally weak  SKIN: Warm and dry , Well perfused  PSYCHIATRIC: AOx3

## 2023-02-25 NOTE — H&P ADULT - PROBLEM SELECTOR PLAN 7
ALP elevation to > 1000. AST and /167  Appears to have chronic ALP elevation and occasional AST/ALT elevation in past but acute rise noted\  perhaps in setting of congestive hepatopathy and bradycardia  -trend LFTs  -tx HF ALP elevation to > 1000. AST and /167  Appears to have chronic ALP elevation and occasional AST/ALT elevation in past but acute rise noted  perhaps in setting of congestive hepatopathy and bradycardia  -trend LFTs q6 for now  -hepatitis panel  -RUQ US

## 2023-02-25 NOTE — H&P ADULT - PROBLEM SELECTOR PLAN 4
B/l Cr unclear but appears to range from 1.5 to 2.0 though in January was 2.0 to 2.3  Cr 2.4 at admission but BUN overtly elevated suggestive of prerenal ISAMAR, perhaps cardiorenal in etiology given CHF and per daughter, increased fluid intake since last admission B/l Cr unclear but appears to range from 1.5 to 2.0 though in January was 2.0 to 2.3  Cr 2.4 at admission but BUN overtly elevated suggestive of prerenal ISAMAR, perhaps cardiorenal in etiology given CHF and per daughter, increased fluid intake since last admission  -tx fluid overload with diuresis  -urine lytes likely to be spurious given recent fluids B/l Cr unclear but appears to range from 1.5 to 2.0 though in January was 2.0 to 2.3  Cr 2.4 at admission but BUN overtly elevated suggestive of prerenal ISAMAR, perhaps cardiorenal in etiology given CHF and per daughter, increased fluid intake since last admission but equivocal if fluid overloaded  -bumex x 1; restart home regimen tmrw  -fluids as needed for managing hypoNa  -urine lytes likely to be spurious given recent fluids but will check

## 2023-02-25 NOTE — ED ADULT NURSE REASSESSMENT NOTE - NS ED NURSE REASSESS COMMENT FT1
Mobile Critical Care RN: patient remains in room 8 at this time, hypothermia near resolving, remains on yudy hugger. epinephrine gtt held at this time with MAP stable above 65 and HR 47-51. plan to admit to medicine.

## 2023-02-25 NOTE — PATIENT PROFILE ADULT - FUNCTIONAL ASSESSMENT - BASIC MOBILITY 6.
2-calculated by average/Not able to assess (calculate score using [facility] averaging method)

## 2023-02-25 NOTE — H&P ADULT - PROBLEM SELECTOR PLAN 3
5.9 on admission, improved to 5.5 s/p albuterol, insulin + dextrose and lokelma  no Ekg changes  likely in setting of ISAMAR  -will repeat BMP and consider another insulin + dextrose  -diuretics as above (patient makes urine) 5.9 on admission, improved to 5.5 s/p albuterol, insulin + dextrose and lokelma  no Ekg changes  likely in setting of ISAMAR  -will repeat BMP and consider another insulin + dextrose

## 2023-02-25 NOTE — H&P ADULT - TIME BILLING
20mins-Chart and previous hospitalizations reviewed as well as labs, vitals prior to patient being seen for approximately   20mins-Meds reviewed and prior outpatient workup   20 mins-Patient seen and examined and plan discussed, all questions were answered to the best of my ability  30 mins-Charting/documentation and orders

## 2023-02-25 NOTE — ED PROVIDER NOTE - CLINICAL SUMMARY MEDICAL DECISION MAKING FREE TEXT BOX
Snehal: 58 yo who presents with dizziness and weakness and cp. found to have heart rate of 24. sinus ravinder. Pacer pads placed, labs sent. Lab studies ordered, independently reviewed and acted on as appropriate.

## 2023-02-25 NOTE — H&P ADULT - NSHPREVIEWOFSYSTEMS_GEN_ALL_CORE
REVIEW OF SYSTEMS:    CONSTITUTIONAL: No fevers or chills +generalized weakness, fatigue, excessive sleepiness  EYES/ENT: No visual changes;  No vertigo or throat pain   NECK: No pain or stiffness  RESPIRATORY: No cough, wheezing, hemoptysis; No shortness of breath  CARDIOVASCULAR: No palpitations +chest pain, pleuritic  GASTROINTESTINAL: No abdominal or epigastric pain. No nausea, vomiting, or hematemesis; No diarrhea or constipation. No melena or hematochezia.  GENITOURINARY: No dysuria, frequency or hematuria  NEUROLOGICAL: No numbness or weakness  SKIN: No itching, rashes

## 2023-02-25 NOTE — H&P ADULT - PROBLEM SELECTOR PLAN 1
Na 121 at admission Na 121 at admission (associated with hyperkalemia)  Appears to have had hyponatremia in past associated with ISAMAR on CKD and fluid overload  CXR with some mild pulmonary congestion, minimal pitting edema, BNP elevated over b/l suggestive of some fluid overload  Na to 126 s/p 500 cc NS bolus, perhaps due to getting hypertonic fluid  -can check urine Osm, urine Na though given patient on diuretics on home and received fluids, likely to be spurious  -give Bumex IV 1 x 1 and put back on home regimen (bumex 1 bid)  -trend BMP q6  -avoid Na increase more than 6-8 in 24 hour period  -if overcorrection, can give D5 50cc/hr for 4 hours and recheck BMP  -fluid restrict to 1L Na 121 at admission (associated with hyperkalemia)  Appears to have had hyponatremia in past associated with ISAMAR on CKD and fluid overload  CXR with some mild pulmonary congestion, BNP elevated over b/l suggestive of some fluid overload but on PE equivocal for fluid overload as normal lung exam, minimal edema  Na to 126 s/p 500 cc NS bolus, perhaps due to getting hypertonic fluid  -can check urine Osm, urine Na though given patient on diuretics on home and received fluids, likely to be spurious  -given Bumex IV 1 x 1; put back on home regimen (bumex 1 bid) tmrw  -trend BMP q6  -avoid Na increase more than 6-8 in 24 hour period  -if overcorrection, can give D5 50cc/hr for 4 hours and recheck BMP  -if sodium drops, can give NS 75 cc/hr for 4 hours

## 2023-02-25 NOTE — H&P ADULT - PROBLEM SELECTOR PLAN 5
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  hypothermia, bradycardia, fatigue might be related  TSH elevated at 11.51, T3 low at 55, no T4 measured  initial concern for hypoadrenalism (given hydrocortisone x 1) given electrolyte abnormalities and clinical picture but cortisol AM not low  given stress dose levothyroxine 300 IV  -measure T4  -endo emailed -> recommending 94 IV levothyroxine QD for now 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  hypothermia, bradycardia, fatigue might be related  TSH elevated at 11.51, T3 low at 55, T4 normal  initial concern for hypoadrenalism (given hydrocortisone x 1) given electrolyte abnormalities and clinical picture but cortisol AM not low  given stress dose levothyroxine 300 IV  -endo emailed -> recommending 94 IV levothyroxine QD for now

## 2023-02-25 NOTE — CONSULT NOTE ADULT - ASSESSMENT
58 yo F w/ hypothyroidism, HTN, T2DM, HLD, CKD, PAH, asthma on albuterol presented to the ED w/ c/o weakness and chest pain. MICU consulted for bradycardia.    #Hypothermia  #Bradycardia  #Hyperkalemia  Patient with severe hypothermia to T=90.5F. Noted to have severe bradycardia to the 20s, likely secondary to hypothermia. Patient with hyperkalemia, hyponatremia and acidosis - concerning for HPA axis involvement, unclear etiology at this time. Noted TSH=11.51, T3=55, not consistent with myxedema coma. In ED patient was started on an epinephrine infusion with improvement in HR to 50s at this time.   Patient started on yudy hugger for hypothermia with improvement in Temp to 94F.  Currently mentating well, hemodynamically stable, and protecting her airway.     Recommend:  - would wean off epinephrine infusion. If patient remains bradycardic with HR<40 after correcting hypothermia, then may benefit from dopamine infusion for bradycardia  - continue with yudy hugger for hypothermia  - monitor vital signs, specifically bradycardia on telemetry   - Not a MICU candidate at this time  - Please call MICU with new concerns 57F w/ hypothyroidism, HTN, T2DM, HLD, CKD, PAH, asthma on albuterol presented to the ED w/ c/o weakness and chest pain. MICU consulted for bradycardia.    #Hypothermia  #Bradycardia  #Hyperkalemia  Patient with severe hypothermia to T=90.5F of unclear etiology - endocrinopathy vs infection. Noted to have severe bradycardia to the 20s, likely secondary to hypothermia. Patient with hyperkalemia, hyponatremia and acidosis - concerning for HPA axis involvement, unclear etiology at this time. Noted TSH=11.51, T3=55, likely myxedema coma (on prior admission TSH=40.8 when myxedema coma was diagnosed). In ED patient was started on an epinephrine infusion with improvement in HR to 50s at this time. Hyperkalemia managed with calcium gluconate, insulin + D50 and lokelma. elevated pBNP possibly secondary to renal dysfunction, was also elevated on prior admissions.   Patient started on yudy hugger for hypothermia with improvement in Temp to 94F.  Currently mentating well, stable HR>50 during encounter, and protecting her airway     Recommend:  - Wean off epinephrine infusion at this time. If patient remains bradycardic with HR<40 after correcting hypothermia, then may benefit from dopamine infusion for bradycardia  - Telemetry to monitor bradycardia, anticipate will improve once hypothermia is treated   - Repeat BMP, correct electrolytes as needed  - Agree with infectious workup, though suspect hypothermia is endocrinopathy related  - Ongoing endocrinopathy workup as per primary team - consider checking cortisol, ACTH stim Test  - Not a MICU candidate at this time  - Please call MICU with new concerns    **Recommendations not finalized until signed by attending**    Case discussed with Dr. Nya Whitt, PGY-3

## 2023-02-25 NOTE — H&P ADULT - NSHPSOCIALHISTORY_GEN_ALL_CORE
No smoking, alcohol, or drug use  No sick contacts  Independent in ADLs but kids manage her medications

## 2023-02-25 NOTE — ED PROVIDER NOTE - CONSIDERATION OF ADMISSION OBSERVATION
heart rate and symptoms of chest pain and dizziness makes her very high risk Consideration of Admission/Observation

## 2023-02-26 DIAGNOSIS — N18.32 CHRONIC KIDNEY DISEASE, STAGE 3B: ICD-10-CM

## 2023-02-26 DIAGNOSIS — E87.1 HYPO-OSMOLALITY AND HYPONATREMIA: ICD-10-CM

## 2023-02-26 LAB
A1C WITH ESTIMATED AVERAGE GLUCOSE RESULT: 6.1 % — HIGH (ref 4–5.6)
ALBUMIN SERPL ELPH-MCNC: 3.4 G/DL — SIGNIFICANT CHANGE UP (ref 3.3–5)
ALBUMIN SERPL ELPH-MCNC: 3.7 G/DL — SIGNIFICANT CHANGE UP (ref 3.3–5)
ALP SERPL-CCNC: 1050 U/L — HIGH (ref 40–120)
ALP SERPL-CCNC: 912 U/L — HIGH (ref 40–120)
ALT FLD-CCNC: 120 U/L — HIGH (ref 4–33)
ALT FLD-CCNC: 122 U/L — HIGH (ref 4–33)
ANION GAP SERPL CALC-SCNC: 13 MMOL/L — SIGNIFICANT CHANGE UP (ref 7–14)
ANION GAP SERPL CALC-SCNC: 14 MMOL/L — SIGNIFICANT CHANGE UP (ref 7–14)
APTT BLD: 41.3 SEC — HIGH (ref 27–36.3)
AST SERPL-CCNC: 73 U/L — HIGH (ref 4–32)
AST SERPL-CCNC: 78 U/L — HIGH (ref 4–32)
BILIRUB SERPL-MCNC: 0.3 MG/DL — SIGNIFICANT CHANGE UP (ref 0.2–1.2)
BILIRUB SERPL-MCNC: 0.5 MG/DL — SIGNIFICANT CHANGE UP (ref 0.2–1.2)
BLD GP AB SCN SERPL QL: NEGATIVE — SIGNIFICANT CHANGE UP
BUN SERPL-MCNC: 104 MG/DL — HIGH (ref 7–23)
BUN SERPL-MCNC: 107 MG/DL — HIGH (ref 7–23)
CALCIUM SERPL-MCNC: 10.1 MG/DL — SIGNIFICANT CHANGE UP (ref 8.4–10.5)
CALCIUM SERPL-MCNC: 10.4 MG/DL — SIGNIFICANT CHANGE UP (ref 8.4–10.5)
CHLORIDE SERPL-SCNC: 97 MMOL/L — LOW (ref 98–107)
CHLORIDE SERPL-SCNC: 99 MMOL/L — SIGNIFICANT CHANGE UP (ref 98–107)
CO2 SERPL-SCNC: 17 MMOL/L — LOW (ref 22–31)
CO2 SERPL-SCNC: 18 MMOL/L — LOW (ref 22–31)
CREAT SERPL-MCNC: 2.29 MG/DL — HIGH (ref 0.5–1.3)
CREAT SERPL-MCNC: 2.31 MG/DL — HIGH (ref 0.5–1.3)
CULTURE RESULTS: SIGNIFICANT CHANGE UP
EGFR: 24 ML/MIN/1.73M2 — LOW
EGFR: 24 ML/MIN/1.73M2 — LOW
ESTIMATED AVERAGE GLUCOSE: 128 — SIGNIFICANT CHANGE UP
FERRITIN SERPL-MCNC: 660 NG/ML — HIGH (ref 15–150)
GAS PNL BLDV: SIGNIFICANT CHANGE UP
GLUCOSE BLDC GLUCOMTR-MCNC: 101 MG/DL — HIGH (ref 70–99)
GLUCOSE BLDC GLUCOMTR-MCNC: 124 MG/DL — HIGH (ref 70–99)
GLUCOSE BLDC GLUCOMTR-MCNC: 134 MG/DL — HIGH (ref 70–99)
GLUCOSE BLDC GLUCOMTR-MCNC: 153 MG/DL — HIGH (ref 70–99)
GLUCOSE BLDC GLUCOMTR-MCNC: 182 MG/DL — HIGH (ref 70–99)
GLUCOSE BLDC GLUCOMTR-MCNC: 206 MG/DL — HIGH (ref 70–99)
GLUCOSE BLDC GLUCOMTR-MCNC: 249 MG/DL — HIGH (ref 70–99)
GLUCOSE BLDC GLUCOMTR-MCNC: 51 MG/DL — CRITICAL LOW (ref 70–99)
GLUCOSE BLDC GLUCOMTR-MCNC: 55 MG/DL — LOW (ref 70–99)
GLUCOSE BLDC GLUCOMTR-MCNC: 58 MG/DL — LOW (ref 70–99)
GLUCOSE BLDC GLUCOMTR-MCNC: 58 MG/DL — LOW (ref 70–99)
GLUCOSE BLDC GLUCOMTR-MCNC: 86 MG/DL — SIGNIFICANT CHANGE UP (ref 70–99)
GLUCOSE SERPL-MCNC: 158 MG/DL — HIGH (ref 70–99)
GLUCOSE SERPL-MCNC: 44 MG/DL — CRITICAL LOW (ref 70–99)
HAV IGM SER-ACNC: SIGNIFICANT CHANGE UP
HBV CORE IGM SER-ACNC: SIGNIFICANT CHANGE UP
HBV SURFACE AG SER-ACNC: SIGNIFICANT CHANGE UP
HCT VFR BLD CALC: 18.9 % — CRITICAL LOW (ref 34.5–45)
HCT VFR BLD CALC: 23.5 % — LOW (ref 34.5–45)
HCV AB S/CO SERPL IA: 0.11 S/CO — SIGNIFICANT CHANGE UP (ref 0–0.99)
HCV AB SERPL-IMP: SIGNIFICANT CHANGE UP
HGB BLD-MCNC: 6.3 G/DL — CRITICAL LOW (ref 11.5–15.5)
HGB BLD-MCNC: 7.7 G/DL — LOW (ref 11.5–15.5)
INR BLD: 1.16 RATIO — SIGNIFICANT CHANGE UP (ref 0.88–1.16)
IRON SATN MFR SERPL: 30 % — SIGNIFICANT CHANGE UP (ref 14–50)
IRON SATN MFR SERPL: 85 UG/DL — SIGNIFICANT CHANGE UP (ref 30–160)
MAGNESIUM SERPL-MCNC: 1.9 MG/DL — SIGNIFICANT CHANGE UP (ref 1.6–2.6)
MAGNESIUM SERPL-MCNC: 2 MG/DL — SIGNIFICANT CHANGE UP (ref 1.6–2.6)
MCHC RBC-ENTMCNC: 25.7 PG — LOW (ref 27–34)
MCHC RBC-ENTMCNC: 26.3 PG — LOW (ref 27–34)
MCHC RBC-ENTMCNC: 32.8 GM/DL — SIGNIFICANT CHANGE UP (ref 32–36)
MCHC RBC-ENTMCNC: 33.3 GM/DL — SIGNIFICANT CHANGE UP (ref 32–36)
MCV RBC AUTO: 77.1 FL — LOW (ref 80–100)
MCV RBC AUTO: 80.2 FL — SIGNIFICANT CHANGE UP (ref 80–100)
NRBC # BLD: 0 /100 WBCS — SIGNIFICANT CHANGE UP (ref 0–0)
NRBC # BLD: 0 /100 WBCS — SIGNIFICANT CHANGE UP (ref 0–0)
NRBC # FLD: 0.03 K/UL — HIGH (ref 0–0)
NRBC # FLD: 0.03 K/UL — HIGH (ref 0–0)
OSMOLALITY UR: 316 MOSM/KG — SIGNIFICANT CHANGE UP (ref 50–1200)
PHOSPHATE SERPL-MCNC: 5.1 MG/DL — HIGH (ref 2.5–4.5)
PHOSPHATE SERPL-MCNC: 5.2 MG/DL — HIGH (ref 2.5–4.5)
PLATELET # BLD AUTO: 82 K/UL — LOW (ref 150–400)
PLATELET # BLD AUTO: 88 K/UL — LOW (ref 150–400)
POTASSIUM SERPL-MCNC: 4 MMOL/L — SIGNIFICANT CHANGE UP (ref 3.5–5.3)
POTASSIUM SERPL-MCNC: 4.2 MMOL/L — SIGNIFICANT CHANGE UP (ref 3.5–5.3)
POTASSIUM SERPL-SCNC: 4 MMOL/L — SIGNIFICANT CHANGE UP (ref 3.5–5.3)
POTASSIUM SERPL-SCNC: 4.2 MMOL/L — SIGNIFICANT CHANGE UP (ref 3.5–5.3)
PROT SERPL-MCNC: 6.3 G/DL — SIGNIFICANT CHANGE UP (ref 6–8.3)
PROT SERPL-MCNC: 6.4 G/DL — SIGNIFICANT CHANGE UP (ref 6–8.3)
PROTHROM AB SERPL-ACNC: 13.5 SEC — HIGH (ref 10.5–13.4)
RBC # BLD: 2.45 M/UL — LOW (ref 3.8–5.2)
RBC # BLD: 2.93 M/UL — LOW (ref 3.8–5.2)
RBC # FLD: 14.7 % — HIGH (ref 10.3–14.5)
RBC # FLD: 15.1 % — HIGH (ref 10.3–14.5)
RH IG SCN BLD-IMP: POSITIVE — SIGNIFICANT CHANGE UP
SODIUM SERPL-SCNC: 127 MMOL/L — LOW (ref 135–145)
SODIUM SERPL-SCNC: 131 MMOL/L — LOW (ref 135–145)
SODIUM UR-SCNC: 69 MMOL/L — SIGNIFICANT CHANGE UP
SPECIMEN SOURCE: SIGNIFICANT CHANGE UP
TIBC SERPL-MCNC: 280 UG/DL — SIGNIFICANT CHANGE UP (ref 220–430)
UIBC SERPL-MCNC: 195 UG/DL — SIGNIFICANT CHANGE UP (ref 110–370)
WBC # BLD: 6.31 K/UL — SIGNIFICANT CHANGE UP (ref 3.8–10.5)
WBC # BLD: 6.36 K/UL — SIGNIFICANT CHANGE UP (ref 3.8–10.5)
WBC # FLD AUTO: 6.31 K/UL — SIGNIFICANT CHANGE UP (ref 3.8–10.5)
WBC # FLD AUTO: 6.36 K/UL — SIGNIFICANT CHANGE UP (ref 3.8–10.5)

## 2023-02-26 PROCEDURE — 76705 ECHO EXAM OF ABDOMEN: CPT | Mod: 26

## 2023-02-26 PROCEDURE — 99233 SBSQ HOSP IP/OBS HIGH 50: CPT | Mod: GC

## 2023-02-26 PROCEDURE — 99222 1ST HOSP IP/OBS MODERATE 55: CPT

## 2023-02-26 PROCEDURE — 99223 1ST HOSP IP/OBS HIGH 75: CPT

## 2023-02-26 RX ORDER — BUMETANIDE 0.25 MG/ML
1 INJECTION INTRAMUSCULAR; INTRAVENOUS
Refills: 0 | Status: DISCONTINUED | OUTPATIENT
Start: 2023-02-26 | End: 2023-03-01

## 2023-02-26 RX ORDER — DEXTROSE 50 % IN WATER 50 %
15 SYRINGE (ML) INTRAVENOUS ONCE
Refills: 0 | Status: COMPLETED | OUTPATIENT
Start: 2023-02-26 | End: 2023-02-26

## 2023-02-26 RX ORDER — SODIUM CHLORIDE 9 MG/ML
200 INJECTION, SOLUTION INTRAVENOUS
Refills: 0 | Status: DISCONTINUED | OUTPATIENT
Start: 2023-02-26 | End: 2023-02-28

## 2023-02-26 RX ADMIN — BUMETANIDE 1 MILLIGRAM(S): 0.25 INJECTION INTRAMUSCULAR; INTRAVENOUS at 05:41

## 2023-02-26 RX ADMIN — Medication 94 MICROGRAM(S): at 21:44

## 2023-02-26 RX ADMIN — BUMETANIDE 1 MILLIGRAM(S): 0.25 INJECTION INTRAMUSCULAR; INTRAVENOUS at 17:28

## 2023-02-26 RX ADMIN — PANTOPRAZOLE SODIUM 40 MILLIGRAM(S): 20 TABLET, DELAYED RELEASE ORAL at 05:41

## 2023-02-26 RX ADMIN — SODIUM CHLORIDE 50 MILLILITER(S): 9 INJECTION, SOLUTION INTRAVENOUS at 07:42

## 2023-02-26 RX ADMIN — HEPARIN SODIUM 5000 UNIT(S): 5000 INJECTION INTRAVENOUS; SUBCUTANEOUS at 21:45

## 2023-02-26 RX ADMIN — Medication 15 GRAM(S): at 05:28

## 2023-02-26 RX ADMIN — Medication 1: at 09:34

## 2023-02-26 RX ADMIN — HEPARIN SODIUM 5000 UNIT(S): 5000 INJECTION INTRAVENOUS; SUBCUTANEOUS at 14:51

## 2023-02-26 RX ADMIN — HEPARIN SODIUM 5000 UNIT(S): 5000 INJECTION INTRAVENOUS; SUBCUTANEOUS at 05:41

## 2023-02-26 NOTE — CONSULT NOTE ADULT - PROBLEM SELECTOR RECOMMENDATION 2
Pt. with hyperkalemia in the setting of CKD and fluid overload. Serum potassium was initially 5.9. Pt. received medical management with IV insulin/D50, Lokelma, and IV Bumex. Serum potassium improved to 4.0 today. Recommend to monitor serum potassium. Low potassium diet. Pt. with hyperkalemia in the setting of CKD. Pt. received medical management with IV insulin/D50, Lokelma, and IV Bumex. Serum potassium improved to 4.0 today. Recommend to monitor serum potassium. Low potassium diet. English

## 2023-02-26 NOTE — PROGRESS NOTE ADULT - SUBJECTIVE AND OBJECTIVE BOX
Sultan Hussein MD  PGY 1 Department of Internal Medicine        Patient is a 57y old  Female who presents with a chief complaint of bradycardia (2023 16:29)      SUBJECTIVE / OVERNIGHT EVENTS: Pt seen and examined. No acute overnight events. Denies fevers, chills, CP, SOB, Abdominal pain, N/V, Constipation, Diarrhea        MEDICATIONS  (STANDING):  buMETAnide Injectable 1 milliGRAM(s) IV Push two times a day  dextrose 50% Injectable 25 Gram(s) IV Push once  dextrose 50% Injectable 12.5 Gram(s) IV Push once  dextrose 50% Injectable 25 Gram(s) IV Push once  dextrose Oral Gel 15 Gram(s) Oral once  glucagon  Injectable 1 milliGRAM(s) IntraMuscular once  heparin   Injectable 5000 Unit(s) SubCutaneous every 8 hours  influenza   Vaccine 0.5 milliLiter(s) IntraMuscular once  insulin glargine Injectable (LANTUS) 3 Unit(s) SubCutaneous at bedtime  insulin lispro (ADMELOG) corrective regimen sliding scale   SubCutaneous three times a day before meals  insulin lispro (ADMELOG) corrective regimen sliding scale   SubCutaneous at bedtime  levothyroxine Injectable 94 MICROGram(s) IV Push at bedtime  pantoprazole    Tablet 40 milliGRAM(s) Oral before breakfast    MEDICATIONS  (PRN):      I&O's Summary      Vital Signs Last 24 Hrs  T(C): 34.8 (2023 01:13), Max: 36.4 (2023 17:56)  T(F): 94.6 (2023 01:13), Max: 97.6 (2023 21:16)  HR: 56 (2023 01:13) (28 - 64)  BP: 147/56 (2023 01:13) (98/60 - 228/109)  BP(mean): 73 (2023 15:15) (70 - 132)  RR: 18 (2023 01:13) (10 - 18)  SpO2: 100% (2023 01:13) (99% - 100%)    Parameters below as of 2023 01:13  Patient On (Oxygen Delivery Method): room air        CAPILLARY BLOOD GLUCOSE      POCT Blood Glucose.: 101 mg/dL (2023 01:08)  POCT Blood Glucose.: 151 mg/dL (2023 22:58)  POCT Blood Glucose.: 172 mg/dL (2023 22:03)  POCT Blood Glucose.: 222 mg/dL (2023 21:06)  POCT Blood Glucose.: 359 mg/dL (2023 20:07)  POCT Blood Glucose.: 376 mg/dL (2023 20:05)  POCT Blood Glucose.: 104 mg/dL (2023 19:43)  POCT Blood Glucose.: 155 mg/dL (2023 16:11)  POCT Blood Glucose.: 196 mg/dL (2023 11:44)  POCT Blood Glucose.: 192 mg/dL (2023 09:50)      PHYSICAL EXAM:  GENERAL APPEARANCE: NAD, sleepy but arousable  HEENT:  PERRL, EOMI. MMM  NECK: Neck supple, non-tender no lymphadenopathy, masses or thyromegaly  CARDIAC: Normal S1 and S2. Systolic murmur. ravinder normal rhythm  LUNGS: Clear to auscultation B/L, no rales, rhonchi, or wheezing appreciated  ABDOMEN: Soft , NTND, bowel sounds normal. No guarding or rebound.   MUSCULOSKELETAL: ROM intact.  No joint erythema or tenderness.   EXTREMITIES: Minimal pitting edema b/l LEs. Peripheral pulses intact.   NEUROLOGICAL: Non focal. Patient not fully cooperative with neuro exam but moving all extremities; appears generally weak  SKIN: Warm and dry , Well perfused  PSYCHIATRIC: AOx3     LABS:                        7.2    6.55  )-----------( 73       ( 2023 10:28 )             21.9     Auto Eosinophil # 0.29  / Auto Eosinophil % 4.4   / Auto Neutrophil # 5.46  / Auto Neutrophil % 82.4  / BANDS % 0.9          127<L>  |  95<L>  |  105<H>  ----------------------------<  171<H>  4.5   |  16<L>  |  2.37<H>      126<L>  |  96<L>  |  107<H>  ----------------------------<  131<H>  6.3<HH>   |  17<L>  |  2.35<H>      126<L>  |  94<L>  |  114<H>  ----------------------------<  139<H>  5.5<H>   |  16<L>  |  2.41<H>    Ca    10.7<H>      2023 22:10  Mg     1.90       Phos  4.8       TPro  7.3  /  Alb  4.0  /  TBili  0.6  /  DBili  x   /  AST  137<H>  /  ALT  167<H>  /  AlkPhos  1137<H>    TPro  7.0  /  Alb  3.9  /  TBili  0.5  /  DBili  x   /  AST  93<H>  /  ALT  136<H>  /  AlkPhos  991<H>      PT/INR - ( 2023 10:28 )   PT: 13.3 sec;   INR: 1.14 ratio         PTT - ( 2023 10:28 )  PTT:43.4 sec      Urinalysis Basic - ( 2023 12:20 )    Color: Light Yellow / Appearance: Clear / S.007 / pH: x  Gluc: x / Ketone: Negative  / Bili: Negative / Urobili: <2 mg/dL   Blood: x / Protein: 30 mg/dL / Nitrite: Negative   Leuk Esterase: Negative / RBC: 2-5 /HPF / WBC 1-3 /HPF   Sq Epi: x / Non Sq Epi: Few / Bacteria: Occasional            RADIOLOGY & ADDITIONAL TESTS:    Imaging Personally Reviewed:    Consultant(s) Notes Reviewed:      Care Discussed with Consultants/Other Providers:   Sultan Hussein MD  PGY 1 Department of Internal Medicine        Patient is a 57y old  Female who presents with a chief complaint of bradycardia (2023 16:29)      SUBJECTIVE / OVERNIGHT EVENTS: Pt seen and examined. Overnight, noted to be hypoglycemic         MEDICATIONS  (STANDING):  buMETAnide Injectable 1 milliGRAM(s) IV Push two times a day  dextrose 50% Injectable 25 Gram(s) IV Push once  dextrose 50% Injectable 12.5 Gram(s) IV Push once  dextrose 50% Injectable 25 Gram(s) IV Push once  dextrose Oral Gel 15 Gram(s) Oral once  glucagon  Injectable 1 milliGRAM(s) IntraMuscular once  heparin   Injectable 5000 Unit(s) SubCutaneous every 8 hours  influenza   Vaccine 0.5 milliLiter(s) IntraMuscular once  insulin glargine Injectable (LANTUS) 3 Unit(s) SubCutaneous at bedtime  insulin lispro (ADMELOG) corrective regimen sliding scale   SubCutaneous three times a day before meals  insulin lispro (ADMELOG) corrective regimen sliding scale   SubCutaneous at bedtime  levothyroxine Injectable 94 MICROGram(s) IV Push at bedtime  pantoprazole    Tablet 40 milliGRAM(s) Oral before breakfast    MEDICATIONS  (PRN):      I&O's Summary      Vital Signs Last 24 Hrs  T(C): 34.8 (2023 01:13), Max: 36.4 (2023 17:56)  T(F): 94.6 (2023 01:13), Max: 97.6 (2023 21:16)  HR: 56 (2023 01:13) (28 - 64)  BP: 147/56 (2023 01:13) (98/60 - 228/109)  BP(mean): 73 (2023 15:15) (70 - 132)  RR: 18 (2023 01:13) (10 - 18)  SpO2: 100% (2023 01:13) (99% - 100%)    Parameters below as of 2023 01:13  Patient On (Oxygen Delivery Method): room air        CAPILLARY BLOOD GLUCOSE      POCT Blood Glucose.: 101 mg/dL (2023 01:08)  POCT Blood Glucose.: 151 mg/dL (2023 22:58)  POCT Blood Glucose.: 172 mg/dL (2023 22:03)  POCT Blood Glucose.: 222 mg/dL (2023 21:06)  POCT Blood Glucose.: 359 mg/dL (2023 20:07)  POCT Blood Glucose.: 376 mg/dL (2023 20:05)  POCT Blood Glucose.: 104 mg/dL (2023 19:43)  POCT Blood Glucose.: 155 mg/dL (2023 16:11)  POCT Blood Glucose.: 196 mg/dL (2023 11:44)  POCT Blood Glucose.: 192 mg/dL (2023 09:50)      PHYSICAL EXAM:  GENERAL APPEARANCE: NAD, sleepy but arousable  HEENT:  PERRL, EOMI. MMM  NECK: Neck supple, non-tender no lymphadenopathy, masses or thyromegaly  CARDIAC: Normal S1 and S2. Systolic murmur. ravinder normal rhythm  LUNGS: Clear to auscultation B/L, no rales, rhonchi, or wheezing appreciated  ABDOMEN: Soft , NTND, bowel sounds normal. No guarding or rebound.   MUSCULOSKELETAL: ROM intact.  No joint erythema or tenderness.   EXTREMITIES: Minimal pitting edema b/l LEs. Peripheral pulses intact.   NEUROLOGICAL: Non focal. Patient not fully cooperative with neuro exam but moving all extremities; appears generally weak  SKIN: Warm and dry , Well perfused  PSYCHIATRIC: AOx3     LABS:                        7.2    6.55  )-----------( 73       ( 2023 10:28 )             21.9     Auto Eosinophil # 0.29  / Auto Eosinophil % 4.4   / Auto Neutrophil # 5.46  / Auto Neutrophil % 82.4  / BANDS % 0.9          127<L>  |  95<L>  |  105<H>  ----------------------------<  171<H>  4.5   |  16<L>  |  2.37<H>      126<L>  |  96<L>  |  107<H>  ----------------------------<  131<H>  6.3<HH>   |  17<L>  |  2.35<H>      126<L>  |  94<L>  |  114<H>  ----------------------------<  139<H>  5.5<H>   |  16<L>  |  2.41<H>    Ca    10.7<H>      2023 22:10  Mg     1.90       Phos  4.8       TPro  7.3  /  Alb  4.0  /  TBili  0.6  /  DBili  x   /  AST  137<H>  /  ALT  167<H>  /  AlkPhos  1137<H>    TPro  7.0  /  Alb  3.9  /  TBili  0.5  /  DBili  x   /  AST  93<H>  /  ALT  136<H>  /  AlkPhos  991<H>      PT/INR - ( 2023 10:28 )   PT: 13.3 sec;   INR: 1.14 ratio         PTT - ( 2023 10:28 )  PTT:43.4 sec      Urinalysis Basic - ( 2023 12:20 )    Color: Light Yellow / Appearance: Clear / S.007 / pH: x  Gluc: x / Ketone: Negative  / Bili: Negative / Urobili: <2 mg/dL   Blood: x / Protein: 30 mg/dL / Nitrite: Negative   Leuk Esterase: Negative / RBC: 2-5 /HPF / WBC 1-3 /HPF   Sq Epi: x / Non Sq Epi: Few / Bacteria: Occasional            RADIOLOGY & ADDITIONAL TESTS:    Imaging Personally Reviewed:    Consultant(s) Notes Reviewed:      Care Discussed with Consultants/Other Providers:   Sultan Hussein MD  PGY 1 Department of Internal Medicine        Patient is a 57y old  Female who presents with a chief complaint of bradycardia (2023 16:29)      SUBJECTIVE / OVERNIGHT EVENTS: Pt seen and examined. Overnight, noted to be hypoglycemic to 50s; received some dextrose oral gel and juice. Became hypothermic again but responded to warming blanket. K WNL now s/p cocktail. Sodium increased to 131 this morning, a 10 point increase in ~20 hours. D5 50 cc/hr for 4 hours ordered. Hgb 6.3 this morning so unit ordered. No bleeding noted. Patient feels much better today, endorsing improvement in strength, appetite, and mentation. Still feels weak. Had a BM. Occasional pain under left breast but not present at time of encounter.         MEDICATIONS  (STANDING):  buMETAnide Injectable 1 milliGRAM(s) IV Push two times a day  dextrose 50% Injectable 25 Gram(s) IV Push once  dextrose 50% Injectable 12.5 Gram(s) IV Push once  dextrose 50% Injectable 25 Gram(s) IV Push once  dextrose Oral Gel 15 Gram(s) Oral once  glucagon  Injectable 1 milliGRAM(s) IntraMuscular once  heparin   Injectable 5000 Unit(s) SubCutaneous every 8 hours  influenza   Vaccine 0.5 milliLiter(s) IntraMuscular once  insulin glargine Injectable (LANTUS) 3 Unit(s) SubCutaneous at bedtime  insulin lispro (ADMELOG) corrective regimen sliding scale   SubCutaneous three times a day before meals  insulin lispro (ADMELOG) corrective regimen sliding scale   SubCutaneous at bedtime  levothyroxine Injectable 94 MICROGram(s) IV Push at bedtime  pantoprazole    Tablet 40 milliGRAM(s) Oral before breakfast    MEDICATIONS  (PRN):      I&O's Summary      Vital Signs Last 24 Hrs  T(C): 34.8 (2023 01:13), Max: 36.4 (2023 17:56)  T(F): 94.6 (2023 01:13), Max: 97.6 (2023 21:16)  HR: 56 (2023 01:13) (28 - 64)  BP: 147/56 (2023 01:13) (98/60 - 228/109)  BP(mean): 73 (2023 15:15) (70 - 132)  RR: 18 (2023 01:13) (10 - 18)  SpO2: 100% (2023 01:13) (99% - 100%)    Parameters below as of 2023 01:13  Patient On (Oxygen Delivery Method): room air        CAPILLARY BLOOD GLUCOSE      POCT Blood Glucose.: 101 mg/dL (2023 01:08)  POCT Blood Glucose.: 151 mg/dL (2023 22:58)  POCT Blood Glucose.: 172 mg/dL (2023 22:03)  POCT Blood Glucose.: 222 mg/dL (2023 21:06)  POCT Blood Glucose.: 359 mg/dL (2023 20:07)  POCT Blood Glucose.: 376 mg/dL (2023 20:05)  POCT Blood Glucose.: 104 mg/dL (2023 19:43)  POCT Blood Glucose.: 155 mg/dL (2023 16:11)  POCT Blood Glucose.: 196 mg/dL (2023 11:44)  POCT Blood Glucose.: 192 mg/dL (2023 09:50)      PHYSICAL EXAM:  GENERAL APPEARANCE: NAD, alert and awake  HEENT:  PERRL, EOMI. MMM  NECK: Neck supple, non-tender no lymphadenopathy, masses or thyromegaly  CARDIAC: Normal S1 and S2. Systolic murmur. ravinder normal rhythm  LUNGS: Clear to auscultation B/L, no rales, rhonchi, or wheezing appreciated  ABDOMEN: Soft , NTND, bowel sounds normal. No guarding or rebound.   MUSCULOSKELETAL: ROM intact.  No joint erythema or tenderness.   EXTREMITIES: Minimal pitting edema b/l LEs. Peripheral pulses intact.   NEUROLOGICAL: Non focal. Strength exam much improved  SKIN: Warm and dry , Well perfused  PSYCHIATRIC: AOx3     LABS:                        7.2    6.55  )-----------( 73       ( 2023 10:28 )             21.9     Auto Eosinophil # 0.29  / Auto Eosinophil % 4.4   / Auto Neutrophil # 5.46  / Auto Neutrophil % 82.4  / BANDS % 0.9      02-25    127<L>  |  95<L>  |  105<H>  ----------------------------<  171<H>  4.5   |  16<L>  |  2.37<H>      126<L>  |  96<L>  |  107<H>  ----------------------------<  131<H>  6.3<HH>   |  17<L>  |  2.35<H>      126<L>  |  94<L>  |  114<H>  ----------------------------<  139<H>  5.5<H>   |  16<L>  |  2.41<H>    Ca    10.7<H>      2023 22:10  Mg     1.90       Phos  4.8       TPro  7.3  /  Alb  4.0  /  TBili  0.6  /  DBili  x   /  AST  137<H>  /  ALT  167<H>  /  AlkPhos  1137<H>    TPro  7.0  /  Alb  3.9  /  TBili  0.5  /  DBili  x   /  AST  93<H>  /  ALT  136<H>  /  AlkPhos  991<H>      PT/INR - ( 2023 10:28 )   PT: 13.3 sec;   INR: 1.14 ratio         PTT - ( 2023 10:28 )  PTT:43.4 sec      Urinalysis Basic - ( 2023 12:20 )    Color: Light Yellow / Appearance: Clear / S.007 / pH: x  Gluc: x / Ketone: Negative  / Bili: Negative / Urobili: <2 mg/dL   Blood: x / Protein: 30 mg/dL / Nitrite: Negative   Leuk Esterase: Negative / RBC: 2-5 /HPF / WBC 1-3 /HPF   Sq Epi: x / Non Sq Epi: Few / Bacteria: Occasional            RADIOLOGY & ADDITIONAL TESTS:    Imaging Personally Reviewed:    Consultant(s) Notes Reviewed:      Care Discussed with Consultants/Other Providers:

## 2023-02-26 NOTE — PROGRESS NOTE ADULT - ASSESSMENT
56 yo F w/ hypothyroidism, HTN, T2DM, HLD, CKD, PAH, asthma on albuterol presented to the ED w/ c/o generalized weakness and right sided CP being admitted for management of bradycardia, electrolyte derangements, and ISAMAR on CKD.

## 2023-02-26 NOTE — PROGRESS NOTE ADULT - PROBLEM SELECTOR PLAN 7
ALP elevation to > 1000. AST and /167  Appears to have chronic ALP elevation and occasional AST/ALT elevation in past but acute rise noted  perhaps in setting of congestive hepatopathy and bradycardia  -trend LFTs q6 for now  -hepatitis panel  -RUQ US ALP elevation to > 1000. AST and /167  Appears to have chronic ALP elevation and occasional AST/ALT elevation in past but acute rise noted  perhaps in setting of congestive hepatopathy and bradycardia  -trend LFTs -> improving  -hepatitis panel  -RUQ US

## 2023-02-26 NOTE — PROGRESS NOTE ADULT - PROBLEM SELECTOR PLAN 4
B/l Cr unclear but appears to range from 1.5 to 2.0 though in January was 2.0 to 2.3  Cr 2.4 at admission but BUN overtly elevated suggestive of prerenal ISAMAR, perhaps cardiorenal in etiology given CHF and per daughter, increased fluid intake since last admission but equivocal if fluid overloaded  -bumex x 1; restart home regimen tmrw  -fluids as needed for managing hypoNa  -urine lytes likely to be spurious given recent fluids but will check B/l Cr unclear but appears to range from 1.5 to 2.0 though in January was 2.0 to 2.3  Cr 2.4 at admission but BUN overtly elevated suggestive of prerenal ISAMAR, perhaps cardiorenal in etiology given CHF and per daughter, increased fluid intake since last admission but equivocal if fluid overloaded; patient herself noting she has not been drinking as much  -bumex x 1; f/u nephro recs on diuresis  -fluids as needed for managing hypoNa  -urine lytes likely to be spurious given recent fluids but will check (FeUrea > 35% suggestive of intrinsic process) B/l Cr unclear but appears to range from 1.5 to 2.0 though in January was 2.0 to 2.3  Cr 2.4 at admission but BUN overtly elevated suggestive of prerenal ISAMAR, perhaps cardiorenal in etiology given CHF and per daughter, increased fluid intake since last admission but equivocal if fluid overloaded; patient herself noting she has not been drinking as much  -bumex x 1; f/u nephro recs   -urine lytes likely to be spurious given recent fluids but will check (FeUrea > 35% suggestive of intrinsic process)

## 2023-02-26 NOTE — PROGRESS NOTE ADULT - PROBLEM SELECTOR PLAN 3
5.9 on admission, improved to 5.5 s/p albuterol, insulin + dextrose and lokelma  no Ekg changes  likely in setting of ISAMAR  -will repeat BMP and consider another insulin + dextrose 5.9 on admission, improved to 5.5 s/p albuterol, insulin + dextrose and lokelma  no Ekg changes  likely in setting of ISAMAR  now improved as ISAMAR improving  -monitor BMP

## 2023-02-26 NOTE — CONSULT NOTE ADULT - SUBJECTIVE AND OBJECTIVE BOX
HPI:  57 year old female with history of hypothyroidism, HTN, Dm Type 2, CKD, PAH, asthma on albuterol who presented to the ED with weakness and right sided chest pain.   Patient reported that he had a recent admission for myxedema coma requiring intubation and vasopressors.   This admission patient presented with bradycardia to the 20s and reported that she had been having sharp left sided chest pain for the past 2-3 days.   As per daughter, patient had also been consuming increased fluids.   Her HR has been in the 40s since the last hospitalization.   In the ED she received Atropine and was started on the epinephrine drip.   She also had a placement of a bairhugger and temp slightly came up.   Na of 121 and K of 5.9 on admission.   Prior to giving the patient Hydrocortisone 100 mg IV, am cortisol was drawn which was 25.8      TSH was notable of 11.5 with T3 of 55   As per the patient she had been taking her levothyroxine every day for the past couple of months.   Currently on levothyroxine 125 mcg   In the ED she was given LT4 300 mcg as a loading dose and then received 94 IV mcg after that.   Reported increased fatigue and constipation with hair loss.   Denied any recent changes in weight.      PAST MEDICAL & SURGICAL HISTORY:  HTN (hypertension)      HLD (hyperlipidemia)      DM (diabetes mellitus)      Hypothyroid      H/O pulmonary hypertension      CKD (chronic kidney disease)      No significant past surgical history          FAMILY HISTORY:  FH: stroke (Father)        Social History:    Outpatient Medications:    MEDICATIONS  (STANDING):  buMETAnide 1 milliGRAM(s) Oral two times a day  dextrose 5%. 200 milliLiter(s) (50 mL/Hr) IV Continuous <Continuous>  dextrose 50% Injectable 25 Gram(s) IV Push once  dextrose 50% Injectable 12.5 Gram(s) IV Push once  dextrose 50% Injectable 25 Gram(s) IV Push once  dextrose Oral Gel 15 Gram(s) Oral once  glucagon  Injectable 1 milliGRAM(s) IntraMuscular once  heparin   Injectable 5000 Unit(s) SubCutaneous every 8 hours  influenza   Vaccine 0.5 milliLiter(s) IntraMuscular once  insulin lispro (ADMELOG) corrective regimen sliding scale   SubCutaneous three times a day before meals  insulin lispro (ADMELOG) corrective regimen sliding scale   SubCutaneous at bedtime  levothyroxine Injectable 94 MICROGram(s) IV Push at bedtime  pantoprazole    Tablet 40 milliGRAM(s) Oral before breakfast    MEDICATIONS  (PRN):      Allergies    No Known Allergies    Intolerances      Review of Systems:  Constitutional: No fever  Eyes: No blurry vision  Neuro: No tremors  HEENT: No pain  Cardiovascular: No chest pain, palpitations  Respiratory: No SOB, no cough  GI: No nausea, vomiting, abdominal pain  : No dysuria  Skin: no rash  Psych: no depression  Endocrine: no polyuria, polydipsia  Hem/lymph: no swelling  Osteoporosis: no fractures    ALL OTHER SYSTEMS REVIEWED AND NEGATIVE    UNABLE TO OBTAIN    PHYSICAL EXAM:  VITALS: T(C): 36.8 (02-26-23 @ 13:37)  T(F): 98.3 (02-26-23 @ 13:37), Max: 98.3 (02-26-23 @ 13:37)  HR: 55 (02-26-23 @ 13:37) (49 - 63)  BP: 123/44 (02-26-23 @ 13:37) (114/47 - 156/66)  RR:  (10 - 18)  SpO2:  (99% - 100%)  Wt(kg): --  GENERAL: NAD, well-groomed, well-developed  EYES: No proptosis, no lid lag, anicteric  HEENT:  Atraumatic, Normocephalic, moist mucous membranes  THYROID: Normal size, no palpable nodules  RESPIRATORY: Clear to auscultation bilaterally; No rales, rhonchi, wheezing, or rubs  CARDIOVASCULAR: Regular rate and rhythm; No murmurs; no peripheral edema  GI: Soft, nontender, non distended, normal bowel sounds  SKIN: Dry, intact, No rashes or lesions  MUSCULOSKELETAL: Full range of motion, normal strength  NEURO: sensation intact, extraocular movements intact, no tremor, normal reflexes  PSYCH: Alert and oriented x 3, normal affect, normal mood  CUSHING'S SIGNS: no striae    POCT Blood Glucose.: 134 mg/dL (02-26-23 @ 12:32)  POCT Blood Glucose.: 182 mg/dL (02-26-23 @ 08:38)  POCT Blood Glucose.: 153 mg/dL (02-26-23 @ 06:45)  POCT Blood Glucose.: 86 mg/dL (02-26-23 @ 06:06)  POCT Blood Glucose.: 58 mg/dL (02-26-23 @ 05:45)  POCT Blood Glucose.: 58 mg/dL (02-26-23 @ 05:43)  POCT Blood Glucose.: 51 mg/dL (02-26-23 @ 05:21)  POCT Blood Glucose.: 55 mg/dL (02-26-23 @ 05:18)  POCT Blood Glucose.: 101 mg/dL (02-26-23 @ 01:08)  POCT Blood Glucose.: 151 mg/dL (02-25-23 @ 22:58)  POCT Blood Glucose.: 172 mg/dL (02-25-23 @ 22:03)  POCT Blood Glucose.: 222 mg/dL (02-25-23 @ 21:06)  POCT Blood Glucose.: 359 mg/dL (02-25-23 @ 20:07)  POCT Blood Glucose.: 376 mg/dL (02-25-23 @ 20:05)  POCT Blood Glucose.: 104 mg/dL (02-25-23 @ 19:43)  POCT Blood Glucose.: 155 mg/dL (02-25-23 @ 16:11)  POCT Blood Glucose.: 196 mg/dL (02-25-23 @ 11:44)  POCT Blood Glucose.: 192 mg/dL (02-25-23 @ 09:50)                            6.3    6.31  )-----------( 88       ( 26 Feb 2023 05:32 )             18.9       02-26    131<L>  |  99  |  104<H>  ----------------------------<  44<LL>  4.0   |  18<L>  |  2.31<H>    eGFR: 24<L>    Ca    10.4      02-26  Mg     2.00     02-26  Phos  5.1     02-26    TPro  6.4  /  Alb  3.4  /  TBili  0.3  /  DBili  x   /  AST  73<H>  /  ALT  120<H>  /  AlkPhos  912<H>  02-26      Thyroid Function Tests:  02-25 @ 14:13 TSH -- FreeT4 2.4 T3 -- Anti TPO -- Anti Thyroglobulin Ab -- TSI --  02-25 @ 10:28 TSH 11.51 FreeT4 -- T3 55 Anti TPO -- Anti Thyroglobulin Ab -- TSI --

## 2023-02-26 NOTE — PROGRESS NOTE ADULT - PROBLEM SELECTOR PLAN 1
Na 121 at admission (associated with hyperkalemia)  Appears to have had hyponatremia in past associated with ISAMAR on CKD and fluid overload  CXR with some mild pulmonary congestion, BNP elevated over b/l suggestive of some fluid overload but on PE equivocal for fluid overload as normal lung exam, minimal edema  Na to 126 s/p 500 cc NS bolus, perhaps due to getting hypertonic fluid  -can check urine Osm, urine Na though given patient on diuretics on home and received fluids, likely to be spurious  -given Bumex IV 1 x 1; put back on home regimen (bumex 1 bid) tmrw  -trend BMP q6  -avoid Na increase more than 6-8 in 24 hour period  -if overcorrection, can give D5 50cc/hr for 4 hours and recheck BMP  -if sodium drops, can give NS 75 cc/hr for 4 hours Na 121 at admission (associated with hyperkalemia)  Appears to have had hyponatremia in past associated with ISAMAR on CKD and fluid overload  CXR with some mild pulmonary congestion, BNP elevated over b/l suggestive of some fluid overload but on PE equivocal for fluid overload as normal lung exam, minimal edema  Na to 126 s/p 500 cc NS bolus, perhaps due to getting hypertonic fluid  Could be multifactorial as equivocal volume exam; 2/2 to hypothyroidism vs SIADH vs fluid overload vs overdiuresis   -can check urine Osm, urine Na though given patient on diuretics on home and received fluids, likely to be spurious -> urine Na high, urine Osm ~ 300, calculate serum Osm hypertonic (high BUN)  -given Bumex IV 1 x 1; will f/u nephro recs on continuing home regimen of bumex  -trend BMP q6  -avoid Na increase more than 6-8 in 24 hour period  -if overcorrection, can give D5 50cc/hr for 4 hours and recheck BMP  -if sodium drops, can give NS 75 cc/hr for 4 hours  -f/u nephro recs Na 121 at admission (associated with hyperkalemia)  Appears to have had hyponatremia in past associated with ISAMAR on CKD and fluid overload  CXR with some mild pulmonary congestion, BNP elevated over b/l suggestive of some fluid overload but on PE equivocal for fluid overload as normal lung exam, minimal edema  Na to 126 s/p 500 cc NS bolus, perhaps due to getting hypertonic fluid  Could be multifactorial as equivocal volume exam; 2/2 to hypothyroidism vs SIADH vs fluid overload vs overdiuresis   -can check urine Osm, urine Na though given patient on diuretics on home and received fluids, likely to be spurious -> urine Na high, urine Osm ~ 300, calculate serum Osm hypertonic (high BUN)  -given Bumex IV 1 x 1; c/w home Bumex as per nephro  -trend BMP q6  -avoid Na increase more than 6-8 in 24 hour period  -f/u nephro recs

## 2023-02-26 NOTE — PROGRESS NOTE ADULT - PROBLEM SELECTOR PLAN 8
On Levemir 3 at bedtime and prandin 1 tid   last A1C 6.9% in Jan 23  -check A1c  -c/w lantus 3  -low dose ISS  -FS qAC and qHS On Levemir 3 at bedtime and prandin 1 tid   last A1C 6.9% in Jan 23  -check A1c  -dc Lantus as hypoglycemic O/N  -low dose ISS  -FS qAC and qHS On Levemir 3 at bedtime and prandin 1 tid   last A1C 6.9% in Jan 23  repeat A1C 6.1%  -check A1c  -dc Lantus as hypoglycemic O/N  -low dose ISS  -FS qAC and qHS

## 2023-02-26 NOTE — CONSULT NOTE ADULT - ATTENDING COMMENTS
This is a 58 y/o F with PMHx of hypothyroidism, DM II, HTN, CKD, asthma, mild pulmonary HTN who presents with severe hypothermia and bradycardia and electrolyte derangements. Heart rate was reportedly in the 20s on arrival with a temperature ~90F. She was started on an epinephrine gtt with improvement in heart rate, however consequently her blood pressure became severely elevated. MICU consulted for severe bradycardia in the setting of hypothermia and electrolyte derangements.    On exam, the patient is alert and oriented and cooperative. Her only complaint is mild L sided pleuritic chest pain and chills. She denies coughing, sputum production, abdominal pain, nausea, vomiting. Of note, she has had several similar presentations in the past, and was diagnosed with myxedema coma during one (with a TSH of 40.8 on 11/2022).    I believe that her profound bradycardia is likely a result of her severe hypothermia, and will resolve with rewarming. It remains unclear why she became so profoundly hypothermic in the first place, and why she has had several of these presentations over the past several months with relatively normal thyroid and adrenal function.    Recommendations:  - Continue rewarming measures  - Full septic workup to rule out infection  - Repeat thyroid function, consider cortisol and ACTH stimulation  - Endocrine consult  - Correct electrolyte abnormalities  - Tele monitoring    Not a MICU candidate at this time. Please reconsult as needed.
Pt. with CKD. Scr elevated/stable at 2.31 today. Assessment and plan for CKD, hyperkalemia and hyponatremia as outlined above. Monitor labs and urine output. Avoid any potential nephrotoxins. Dose medications as per eGFR.

## 2023-02-26 NOTE — PROGRESS NOTE ADULT - PROBLEM SELECTOR PLAN 2
has been chronically ravinder at home since last admission, perhaps precipitated by poorly controlled hypothyroidism (though as per daughter has been taking medications) and use of labetalol at home  now in 50s  Ekg sinus ravinder w/ first degree AV block  responded to tx of hypothermia  -manage hypothyroidism  -c/w bearhugger  -hold BB  -if ravinder < 40 address reversible causes and then can consider atropine -> consider EP/MICU eval; MICU suggesting dopamine infusion if ravinder  if unstable, ACLS  -tele monitoring    #Hypothermia  ~90 on admission now normothermic s/p blanket  Unclear why patient so hypothermic; manifestation of myxedema coma?  possible infection but no leukocytosis or infectious symptoms. few bacteria on U/A but no other findings on U/A suggestive of infection or patient/family noting urinary symptoms  -bear roby  -endo recs  -f/u BCx drawn in ED  -monitor off Abx has been chronically ravinder at home since last admission, perhaps precipitated by poorly controlled hypothyroidism (though as per daughter has been taking medications) and use of labetalol at home  now in 50s  Ekg sinus ravinder w/ first degree AV block  responded to tx of hypothermia  -manage hypothyroidism  -c/w bearhugger  -hold BB  -if ravinder < 40 address reversible causes and then can consider atropine -> consider EP/MICU eval; MICU suggesting dopamine infusion if ravinder  if unstable, ACLS  -tele monitoring    #Hypothermia  ~90 on admission now normothermic s/p blanket  Unclear why patient so hypothermic; manifestation of myxedema coma?  possible infection but no leukocytosis or infectious symptoms. few bacteria on U/A but no other findings on U/A suggestive of infection or patient/family noting urinary symptoms  hypoglycemica noted O/N -> possible contribution (as per patient though sugars are ok at home)  -bear hugger  -endo recs  -f/u BCx drawn in ED  -monitor off Abx

## 2023-02-26 NOTE — CONSULT NOTE ADULT - PROBLEM SELECTOR RECOMMENDATION 3
Pt. with hyponatremia likely in the setting of volume overload. Serum sodium was initially 121 (2/25). Pt. received IV Bumex. SNa improved to 131 today. Recommend to monitor SNa.     If you have any questions, please feel free to contact me  Cody Mcguire  Nephrology Fellow  [phone] / [employer] Teams(Preferred)  (After 5pm or on weekends please page the on-call fellow) Pt. with hyponatremia likely in the setting of volume overload. SNa was initially 121 (2/25). Pt. received IV Bumex. SNa improved to 131 today. Recommend to monitor SNa.     If you have any questions, please feel free to contact me  Cody Mcguire  Nephrology Fellow  [phone] / [employer] Teams(Preferred)  (After 5pm or on weekends please page the on-call fellow)

## 2023-02-26 NOTE — CONSULT NOTE ADULT - SUBJECTIVE AND OBJECTIVE BOX
[facility] DIVISION OF KIDNEY DISEASES AND HYPERTENSION -- [phone]  -- INITIAL CONSULT NOTE  --------------------------------------------------------------------------------  HPI: 58 yo F with h/o CKD-3b in the setting of longstanding DM and HTN, HLD, and asthma initially presented with generalized weakness and L-sided chest/breast pain. Pt. was found to be bradycardic to 20s and hypothermic to 90.5 F. MICU was consulted. Pt. received IV epinephrine infusion with improvement in vitals. Nephrology was consulted for CKD, and hyperkalemia. [facility]. SCr initially during this admission was 2.49 on 2/25/23. Pt. received IV Bumex. SCr is elevated/stable at 2.31 today.     Pt. was seen and evaluated in the ER this morning. She reports severe generalized weakness which prompted her to come to the ER. She reports improvement in weakness this morning. She also reports L breast pain. Otherwise, feels well. Outpatient nephrologist is Dr. Coyle, last seen in clinic on 12/2/22. Pt. reports compliance with medications and fluid intake. However, she does report consuming eddo root often. Pt. was informed that eddo root is high in potassium and should be avoided. Denies any headaches, nausea, vomiting, fevers/chills, chest pain, SOB, abdominal pain, and leg swelling.     PAST HISTORY  --------------------------------------------------------------------------------  PAST MEDICAL & SURGICAL HISTORY:  HTN (hypertension)  HLD (hyperlipidemia)  DM (diabetes mellitus)  Hypothyroid  H/O pulmonary hypertension  CKD (chronic kidney disease)    No significant past surgical history    FAMILY HISTORY:  FH: stroke (Father)    ALLERGIES & MEDICATIONS  --------------------------------------------------------------------------------  Allergies    No Known Allergies    Intolerances      Standing Inpatient Medications  buMETAnide 1 milliGRAM(s) Oral two times a day  dextrose 5%. 200 milliLiter(s) IV Continuous <Continuous>  dextrose 50% Injectable 25 Gram(s) IV Push once  dextrose 50% Injectable 12.5 Gram(s) IV Push once  dextrose 50% Injectable 25 Gram(s) IV Push once  dextrose Oral Gel 15 Gram(s) Oral once  glucagon  Injectable 1 milliGRAM(s) IntraMuscular once  heparin   Injectable 5000 Unit(s) SubCutaneous every 8 hours  influenza   Vaccine 0.5 milliLiter(s) IntraMuscular once  insulin lispro (ADMELOG) corrective regimen sliding scale   SubCutaneous three times a day before meals  insulin lispro (ADMELOG) corrective regimen sliding scale   SubCutaneous at bedtime  levothyroxine Injectable 94 MICROGram(s) IV Push at bedtime  pantoprazole    Tablet 40 milliGRAM(s) Oral before breakfast    PRN Inpatient Medications      REVIEW OF SYSTEMS  --------------------------------------------------------------------------------  Gen: +Generalized weakness  Skin: No rashes  Respiratory: No dyspnea  CV: No chest pain  GI: No abdominal pain  : No dysuria, hematuria  MSK: No edema, +L breast pain  Heme: No easy bruising or bleeding  Psych: No significant depression    All other systems were reviewed and are negative, except as noted.    VITALS/PHYSICAL EXAM  --------------------------------------------------------------------------------  T(C): 36.7 (02-26-23 @ 10:27), Max: 36.8 (02-26-23 @ 05:40)  HR: 55 (02-26-23 @ 10:27) (46 - 63)  BP: 118/46 (02-26-23 @ 10:27) (114/47 - 156/66)  RR: 14 (02-26-23 @ 10:27) (10 - 18)  SpO2: 100% (02-26-23 @ 10:27) (99% - 100%)  Wt(kg): --  Height (cm): 152.4 (02-25-23 @ 09:44)  Weight (kg): 60 (02-25-23 @ 10:39)  BMI (kg/m2): 25.8 (02-25-23 @ 10:39)  BSA (m2): 1.57 (02-25-23 @ 10:39)      02-25-23 @ 07:01  -  02-26-23 @ 07:00  --------------------------------------------------------  IN: 0 mL / OUT: 600 mL / NET: -600 mL      Physical Exam:  Gen: NAD  HEENT: MMM  Pulm: CTA B/L  CV: S1S2  Abd: Soft, +BS   Ext: No LE edema B/L  Neuro: Awake and alert  Skin: Warm and dry  Vascular access: Peripheral IV    LABS/STUDIES  --------------------------------------------------------------------------------              6.3    6.31  >-----------<  88       [02-26-23 @ 05:32]              18.9     131  |  99  |  104  ----------------------------<  44      [02-26-23 @ 05:32]  4.0   |  18  |  2.31        Ca     10.4     [02-26-23 @ 05:32]      iCa    1.30     [02-25 @ 10:27]      Mg     2.00     [02-26-23 @ 05:32]      Phos  5.1     [02-26-23 @ 05:32]    TPro  6.4  /  Alb  3.4  /  TBili  0.3  /  DBili  x   /  AST  73  /  ALT  120  /  AlkPhos  912  [02-26-23 @ 05:32]    PT/INR: PT 13.5 , INR 1.16       [02-26-23 @ 05:32]  PTT: 41.3       [02-26-23 @ 05:32]    Creatinine Trend:  SCr 2.31 [02-26 @ 05:32]  SCr 2.37 [02-25 @ 22:10]  SCr 2.35 [02-25 @ 18:20]  SCr 2.41 [02-25 @ 14:13]  SCr 2.39 [02-25 @ 12:02]    Urinalysis - [02-25-23 @ 12:20]      Color Light Yellow / Appearance Clear / SG 1.007 / pH 6.0      Gluc Negative / Ketone Negative  / Bili Negative / Urobili <2 mg/dL       Blood Negative / Protein 30 mg/dL / Leuk Est Negative / Nitrite Negative      RBC 2-5 / WBC 1-3 / Hyaline  / Gran  / Sq Epi  / Non Sq Epi Few / Bacteria Occasional    Urine Creatinine 20      [02-25-23 @ 21:25]  Urine Sodium 69      [02-25-23 @ 21:25]  Urine Urea Nitrogen 332.7      [02-25-23 @ 21:25]  Urine Osmolality 316      [02-25-23 @ 21:25]    Iron 85, TIBC 280, %sat 30      [02-26-23 @ 05:32]  Ferritin 660      [02-26-23 @ 05:32]  TSH 11.51      [02-25-23 @ 10:28]  Lipid: chol 107, TG 28, HDL 61, LDL --      [11-25-22 @ 21:00]    HBsAg Nonreact      [02-26-23 @ 05:32]  HCV 0.11, Nonreact      [02-26-23 @ 05:32]  HIV Nonreact      [01-20-23 @ 19:51]    STEVO: titer Negative, pattern --      [01-19-23 @ 05:02]  dsDNA <12      [01-19-23 @ 05:02]  C3 Complement 128      [01-19-23 @ 05:02]  C4 Complement 46      [01-19-23 @ 05:02]  Rheumatoid Factor 10      [01-19-23 @ 05:02]  ANCA: cANCA Negative, pANCA Negative, atypical ANCA Indeterminate Method interference due to STEVO Fluorescence      [01-19-23 @ 05:02]  anti-GBM <0.2      [01-19-23 @ 05:02]  Free Light Chains: kappa 8.92, lambda 3.79, ratio = 2.35      [01-19 @ 05:02]  Immunofixation Serum:   No Monoclonal Band Identified. HUMPHREY Mccrary MD    Reference Range: None Detected      [01-19-23 @ 05:02]  SPEP Interpretation: Increased Beta fraction, possibly transferrin increase. HUMPHREY Mccrary MD      [01-19-23 @ 05:02]  Immunofixation Urine:   Reference Range: None Detected      [01-19-23 @ 08:10]   [facility] DIVISION OF KIDNEY DISEASES AND HYPERTENSION -- [phone]  -- INITIAL CONSULT NOTE  --------------------------------------------------------------------------------  HPI: 56 yo F with history of CKD-3b in the setting of longstanding DM and HTN, HLD, and asthma initially presented with generalized weakness and left sided chest/breast pain. Pt. was found to be bradycardic to 20s and hypothermic to 90.5 F. MICU was consulted. Pt. received IV epinephrine infusion with improvement in vitals. Nephrology was consulted for CKD, and hyperkalemia. [facility]. Scr initially during this admission was 2.49 on 2/25/23. Pt. received IV Bumex. Scr is elevated/stable at 2.31 and serum potassium WNL at 4.0 today.     Pt. was seen and evaluated in the ER this morning. She reports severe generalized weakness which prompted her to come to the ER. She reports improvement in weakness this morning. She also reports L breast pain. Otherwise, feels well. Outpatient nephrologist is Dr. Coyle, last seen in clinic on 12/2/22. Pt. reports compliance with medications and fluid intake. However, she does report consuming "eddo root" recently. Pt. was informed that eddo root is high in potassium and should be avoided. Denies any headaches, nausea, vomiting, fevers/chills, chest pain, SOB, abdominal pain, and leg swelling.     PAST HISTORY  --------------------------------------------------------------------------------  PAST MEDICAL & SURGICAL HISTORY:  HTN (hypertension)  HLD (hyperlipidemia)  DM (diabetes mellitus)  Hypothyroid  H/O pulmonary hypertension  CKD (chronic kidney disease)    No significant past surgical history    FAMILY HISTORY:  FH: stroke (Father)    ALLERGIES & MEDICATIONS  --------------------------------------------------------------------------------  Allergies    No Known Allergies    Intolerances    Standing Inpatient Medications  buMETAnide 1 milliGRAM(s) Oral two times a day  dextrose 5%. 200 milliLiter(s) IV Continuous <Continuous>  dextrose 50% Injectable 25 Gram(s) IV Push once  dextrose 50% Injectable 12.5 Gram(s) IV Push once  dextrose 50% Injectable 25 Gram(s) IV Push once  dextrose Oral Gel 15 Gram(s) Oral once  glucagon  Injectable 1 milliGRAM(s) IntraMuscular once  heparin   Injectable 5000 Unit(s) SubCutaneous every 8 hours  influenza   Vaccine 0.5 milliLiter(s) IntraMuscular once  insulin lispro (ADMELOG) corrective regimen sliding scale   SubCutaneous three times a day before meals  insulin lispro (ADMELOG) corrective regimen sliding scale   SubCutaneous at bedtime  levothyroxine Injectable 94 MICROGram(s) IV Push at bedtime  pantoprazole    Tablet 40 milliGRAM(s) Oral before breakfast    PRN Inpatient Medications    REVIEW OF SYSTEMS  --------------------------------------------------------------------------------  Gen: +Generalized weakness  Skin: No rashes  Respiratory: No dyspnea  CV: See HPI, +L breast/chest pain  GI: No abdominal pain  : No dysuria, hematuria  MSK: No LE edema,   Heme: No easy bruising or bleeding  Psych: No significant depression    All other systems were reviewed and are negative, except as noted.    VITALS/PHYSICAL EXAM  --------------------------------------------------------------------------------  T(C): 36.7 (02-26-23 @ 10:27), Max: 36.8 (02-26-23 @ 05:40)  HR: 55 (02-26-23 @ 10:27) (46 - 63)  BP: 118/46 (02-26-23 @ 10:27) (114/47 - 156/66)  RR: 14 (02-26-23 @ 10:27) (10 - 18)  SpO2: 100% (02-26-23 @ 10:27) (99% - 100%)  Wt(kg): --  Height (cm): 152.4 (02-25-23 @ 09:44)  Weight (kg): 60 (02-25-23 @ 10:39)  BMI (kg/m2): 25.8 (02-25-23 @ 10:39)  BSA (m2): 1.57 (02-25-23 @ 10:39)    02-25-23 @ 07:01  -  02-26-23 @ 07:00  --------------------------------------------------------  IN: 0 mL / OUT: 600 mL / NET: -600 mL    Physical Exam:    Gen: resting, NAD  HEENT: MMM  Pulm: CTA B/L  CV: S1S2+  Abd: Soft, +BS   Ext: No LE edema B/L  Neuro: Awake and alert  Skin: Warm and dry  Vascular access: Peripheral IV    LABS/STUDIES  --------------------------------------------------------------------------------              6.3    6.31  >-----------<  88       [02-26-23 @ 05:32]              18.9     131  |  99  |  104  ----------------------------<  44      [02-26-23 @ 05:32]  4.0   |  18  |  2.31        Ca     10.4     [02-26-23 @ 05:32]      iCa    1.30     [02-25 @ 10:27]      Mg     2.00     [02-26-23 @ 05:32]      Phos  5.1     [02-26-23 @ 05:32]    TPro  6.4  /  Alb  3.4  /  TBili  0.3  /  DBili  x   /  AST  73  /  ALT  120  /  AlkPhos  912  [02-26-23 @ 05:32]    Creatinine Trend:  SCr 2.31 [02-26 @ 05:32]  SCr 2.37 [02-25 @ 22:10]  SCr 2.35 [02-25 @ 18:20]  SCr 2.41 [02-25 @ 14:13]  SCr 2.39 [02-25 @ 12:02]    Urinalysis - [02-25-23 @ 12:20]      Color Light Yellow / Appearance Clear / SG 1.007 / pH 6.0      Gluc Negative / Ketone Negative  / Bili Negative / Urobili <2 mg/dL       Blood Negative / Protein 30 mg/dL / Leuk Est Negative / Nitrite Negative      RBC 2-5 / WBC 1-3 / Hyaline  / Gran  / Sq Epi  / Non Sq Epi Few / Bacteria Occasional    Urine Creatinine 20      [02-25-23 @ 21:25]  Urine Sodium 69      [02-25-23 @ 21:25]  Urine Urea Nitrogen 332.7      [02-25-23 @ 21:25]  Urine Osmolality 316      [02-25-23 @ 21:25]    HBsAg Nonreact      [02-26-23 @ 05:32]  HCV 0.11, Nonreact      [02-26-23 @ 05:32]  HIV Nonreact      [01-20-23 @ 19:51]    STEVO: titer Negative, pattern --      [01-19-23 @ 05:02]  dsDNA <12      [01-19-23 @ 05:02]  C3 Complement 128      [01-19-23 @ 05:02]  C4 Complement 46      [01-19-23 @ 05:02]  Rheumatoid Factor 10      [01-19-23 @ 05:02]  ANCA: cANCA Negative, pANCA Negative, atypical ANCA Indeterminate Method interference due to STEVO Fluorescence      [01-19-23 @ 05:02]  anti-GBM <0.2      [01-19-23 @ 05:02]  Free Light Chains: kappa 8.92, lambda 3.79, ratio = 2.35      [01-19 @ 05:02]  Immunofixation Serum:   No Monoclonal Band Identified. HUMPHREY Mccrary MD    Reference Range: None Detected      [01-19-23 @ 05:02]  SPEP Interpretation: Increased Beta fraction, possibly transferrin increase. HUMPHREY Mccrary MD      [01-19-23 @ 05:02]  Immunofixation Urine:   Reference Range: None Detected      [01-19-23 @ 08:10]

## 2023-02-26 NOTE — CONSULT NOTE ADULT - ASSESSMENT
57 year old female with history of hypothyroidism, HTN, Dm Type 2, CKD, PAH, asthma on albuterol who presented to the ED with weakness and right sided chest pain found to be bradycardia and hypothermic.   TSH was 11.5 in the setting of previous history of myxedema and then was consulted based on that.   Patient reported compliance with her levothyroxine outpatient       Hypothyroidism   -Mild hypothyroidism, not explaining the current situation of bradycardia and hypothermia  -Adrenal insufficiency ruled out given elevated cortisol   -Would continue IV levothyroxine in this setting at 94 mcg, incase there is GI edema causing decreased absorption   -Check FT4 and TT3 tomorrow and can trend every 1-2 days     Hypoglycemia   -Likely 2/2 overtreatment of her initial hyperglycemia with regular insulin   -At this time best to keep with low correctional scale   -HgA1C of 6.1% outpatient     Endocrine team will continue to monitor

## 2023-02-26 NOTE — PROGRESS NOTE ADULT - PROBLEM SELECTOR PLAN 10
hgb 7.2 at admission  No signs of bleeding   stable from prior, likely in setting of ACD/CKD  microcytic  -check iron panel  -active T&S    Thrombocytopenia  stable from prior  -trend hgb 7.2 at admission  No signs of bleeding   stable from prior, likely in setting of ACD/CKD  microcytic  -check iron panel -> suggestive of ACD  -active T&S  -transfuse for Hgb < 7  -may benefit from EPO?    Thrombocytopenia  stable from prior  -trend hgb 7.2 at admission  No signs of bleeding   stable from prior, likely in setting of ACD/CKD  microcytic  drop to 6.3 2/26 -> 1 unit ordered  -check iron panel -> suggestive of ACD  -active T&S  -transfuse for Hgb < 7  -may benefit from EPO?    Thrombocytopenia  stable from prior  -trend

## 2023-02-26 NOTE — PROVIDER CONTACT NOTE (HYPOGLYCEMIA EVENT) - NS PROVIDER CONTACT NOTE-NOTIFY DATE & TIME-HYPO
26-Feb-2023 05:22 Date/Time Patient Seen:  		  Referring MD:   Data Reviewed	       Patient is a 81y old  Male who presents with a chief complaint of CHF, A fib, Flu (18 Jan 2023 14:10)      Subjective/HPI     PAST MEDICAL & SURGICAL HISTORY:  Congestive heart failure (CHF)    Atrial fibrillation    HLD (hyperlipidemia)    HTN (hypertension)    CAD (coronary artery disease)    S/P coronary artery stent placement    S/P CABG (coronary artery bypass graft)          Medication list         MEDICATIONS  (STANDING):  apixaban 5 milliGRAM(s) Oral every 12 hours  atorvastatin 10 milliGRAM(s) Oral at bedtime  benzonatate 100 milliGRAM(s) Oral every 8 hours  chlorproMAZINE    Tablet 25 milliGRAM(s) Oral every 8 hours  clopidogrel Tablet 75 milliGRAM(s) Oral daily  diltiazem    milliGRAM(s) Oral every 24 hours  FIRST- Mouthwash  BLM 5 milliLiter(s) Swish and Spit three times a day  furosemide    Tablet 40 milliGRAM(s) Oral daily  gabapentin 100 milliGRAM(s) Oral two times a day  isosorbide   mononitrate ER Tablet (IMDUR) 60 milliGRAM(s) Oral daily  metoprolol tartrate 50 milliGRAM(s) Oral every 6 hours  polyethylene glycol 3350 17 Gram(s) Oral daily  senna 2 Tablet(s) Oral at bedtime  sucralfate suspension 1 Gram(s) Oral two times a day    MEDICATIONS  (PRN):  benzocaine 20% Spray 1 Spray(s) Topical <User Schedule> PRN Sore Throat  bisacodyl 5 milliGRAM(s) Oral every 12 hours PRN Constipation  guaiFENesin Oral Liquid (Sugar-Free) 200 milliGRAM(s) Oral every 6 hours PRN Cough  ondansetron Injectable 4 milliGRAM(s) IV Push every 8 hours PRN Nausea and/or Vomiting         Vitals log        ICU Vital Signs Last 24 Hrs  T(C): 36.3 (19 Jan 2023 05:22), Max: 36.9 (18 Jan 2023 12:28)  T(F): 97.4 (19 Jan 2023 05:22), Max: 98.4 (18 Jan 2023 12:28)  HR: 93 (19 Jan 2023 05:22) (91 - 106)  BP: 112/71 (19 Jan 2023 05:22) (102/62 - 124/68)  BP(mean): --  ABP: --  ABP(mean): --  RR: 19 (19 Jan 2023 05:22) (19 - 20)  SpO2: 93% (19 Jan 2023 05:22) (92% - 96%)    O2 Parameters below as of 19 Jan 2023 05:22  Patient On (Oxygen Delivery Method): nasal cannula  O2 Flow (L/min): 2               Input and Output:  I&O's Detail    17 Jan 2023 07:01  -  18 Jan 2023 07:00  --------------------------------------------------------  IN:  Total IN: 0 mL    OUT:    Voided (mL): 600 mL  Total OUT: 600 mL    Total NET: -600 mL      18 Jan 2023 07:01  -  19 Jan 2023 05:43  --------------------------------------------------------  IN:    Oral Fluid: 540 mL  Total IN: 540 mL    OUT:  Total OUT: 0 mL    Total NET: 540 mL          Lab Data                        12.3   14.27 )-----------( 332      ( 18 Jan 2023 06:53 )             37.1     01-18    132<L>  |  92<L>  |  15  ----------------------------<  124<H>  3.8   |  35<H>  |  0.73    Ca    8.4<L>      18 Jan 2023 06:53  Phos  1.9     01-17  Mg     1.9     01-17    TPro  7.7  /  Alb  1.9<L>  /  TBili  1.4<H>  /  DBili  x   /  AST  29  /  ALT  21  /  AlkPhos  111  01-17            Review of Systems	      Objective     Physical Examination    heart s1s2  lung dec BS  head nc      Pertinent Lab findings & Imaging      Christiano:  NO   Adequate UO     I&O's Detail    17 Jan 2023 07:01  -  18 Jan 2023 07:00  --------------------------------------------------------  IN:  Total IN: 0 mL    OUT:    Voided (mL): 600 mL  Total OUT: 600 mL    Total NET: -600 mL      18 Jan 2023 07:01  -  19 Jan 2023 05:43  --------------------------------------------------------  IN:    Oral Fluid: 540 mL  Total IN: 540 mL    OUT:  Total OUT: 0 mL    Total NET: 540 mL               Discussed with:     Cultures:	        Radiology

## 2023-02-26 NOTE — PROVIDER CONTACT NOTE (HYPOGLYCEMIA EVENT) - NS PROVIDER CONTACT BACKGROUND-HYPO
Age: 57y    Gender: Female    POCT Blood Glucose:  86 mg/dL (02-26-23 @ 06:06)  58 mg/dL (02-26-23 @ 05:45)  58 mg/dL (02-26-23 @ 05:43)  51 mg/dL (02-26-23 @ 05:21)  55 mg/dL (02-26-23 @ 05:18)  101 mg/dL (02-26-23 @ 01:08)  151 mg/dL (02-25-23 @ 22:58)  172 mg/dL (02-25-23 @ 22:03)      eMAR:dextrose 50% Injectable   50 milliLiter(s) IV Push (02-25-23 @ 11:47)    dextrose 50% Injectable   100 milliLiter(s) IV Push (02-25-23 @ 19:46)    dextrose Oral Gel   15 Gram(s) Oral (02-26-23 @ 05:28)    hydrocortisone sodium succinate Injectable   100 milliGRAM(s) IV Push (02-25-23 @ 10:55)    insulin glargine Injectable (LANTUS)   3 Unit(s) SubCutaneous (02-25-23 @ 22:07)    insulin lispro (ADMELOG) corrective regimen sliding scale   2 Unit(s) SubCutaneous (02-25-23 @ 21:11)    insulin regular  human recombinant   10 Unit(s) IV Push (02-25-23 @ 19:51)    insulin regular  human recombinant   5 Unit(s) IV Push (02-25-23 @ 11:47)    levothyroxine Injectable   300 MICROGram(s) IV Push (02-25-23 @ 11:37)    levothyroxine Injectable   94 MICROGram(s) IV Push (02-25-23 @ 22:38)

## 2023-02-26 NOTE — CONSULT NOTE ADULT - PROBLEM SELECTOR RECOMMENDATION 9
Pt. with h/o stage 3b CKD in the setting of longstanding DM and HTN. Upon review of [place]/[facility], SCr was 2.27 on 1/31/22. SCr initially during this admission was 2.49 on 2/25/23. Pt. received IV Bumex. SCr is elevated/stable at 2.31 today. Documented urine output is 600 cc over the past 24 hours. UA shows dilute urine (specific gravity: 1.007), and proteinuria. Serum phosphorus is 5.1. Pt. with CKD stage 3b. Recommend to resume home diuretic (Bumex 1 mg po bid). Monitor labs and urine output. Avoid nephrotoxins. Dose medications as per eGFR. Pt. with CKD stage 3b in the setting of longstanding DM and HTN. Upon review of [place]/[facility], Scr was 2.27 on 1/31/22. Scr was 2.49 on 2/25/23. Pt. received IV Bumex. Scr is elevated/stable at 2.31 today. Documented urine output is 600 cc over the past 24 hours. UA shows dilute urine (specific gravity: 1.007), and proteinuria. Serum phosphorus is 5.1. Pt. with stable CKD stage 3b. Recommend to resume home/oral diuretic therapy (Bumex 1 mg PO BID). Monitor labs and urine output. Avoid nephrotoxins. Dose medications as per eGFR.

## 2023-02-27 DIAGNOSIS — E16.2 HYPOGLYCEMIA, UNSPECIFIED: ICD-10-CM

## 2023-02-27 LAB
ALBUMIN SERPL ELPH-MCNC: 3.7 G/DL — SIGNIFICANT CHANGE UP (ref 3.3–5)
ALP SERPL-CCNC: 1032 U/L — HIGH (ref 40–120)
ALT FLD-CCNC: 110 U/L — HIGH (ref 4–33)
ANION GAP SERPL CALC-SCNC: 13 MMOL/L — SIGNIFICANT CHANGE UP (ref 7–14)
ANION GAP SERPL CALC-SCNC: 14 MMOL/L — SIGNIFICANT CHANGE UP (ref 7–14)
APTT BLD: 49.5 SEC — HIGH (ref 27–36.3)
AST SERPL-CCNC: 60 U/L — HIGH (ref 4–32)
BASOPHILS # BLD AUTO: 0.02 K/UL — SIGNIFICANT CHANGE UP (ref 0–0.2)
BASOPHILS NFR BLD AUTO: 0.3 % — SIGNIFICANT CHANGE UP (ref 0–2)
BILIRUB SERPL-MCNC: 0.4 MG/DL — SIGNIFICANT CHANGE UP (ref 0.2–1.2)
BUN SERPL-MCNC: 100 MG/DL — HIGH (ref 7–23)
BUN SERPL-MCNC: 101 MG/DL — HIGH (ref 7–23)
CALCIUM SERPL-MCNC: 9.9 MG/DL — SIGNIFICANT CHANGE UP (ref 8.4–10.5)
CALCIUM SERPL-MCNC: 9.9 MG/DL — SIGNIFICANT CHANGE UP (ref 8.4–10.5)
CHLORIDE SERPL-SCNC: 101 MMOL/L — SIGNIFICANT CHANGE UP (ref 98–107)
CHLORIDE SERPL-SCNC: 98 MMOL/L — SIGNIFICANT CHANGE UP (ref 98–107)
CO2 SERPL-SCNC: 19 MMOL/L — LOW (ref 22–31)
CO2 SERPL-SCNC: 20 MMOL/L — LOW (ref 22–31)
CREAT SERPL-MCNC: 2.1 MG/DL — HIGH (ref 0.5–1.3)
CREAT SERPL-MCNC: 2.15 MG/DL — HIGH (ref 0.5–1.3)
EGFR: 26 ML/MIN/1.73M2 — LOW
EGFR: 27 ML/MIN/1.73M2 — LOW
EOSINOPHIL # BLD AUTO: 0.32 K/UL — SIGNIFICANT CHANGE UP (ref 0–0.5)
EOSINOPHIL NFR BLD AUTO: 5 % — SIGNIFICANT CHANGE UP (ref 0–6)
GLUCOSE BLDC GLUCOMTR-MCNC: 99 MG/DL — SIGNIFICANT CHANGE UP (ref 70–99)
GLUCOSE SERPL-MCNC: 224 MG/DL — HIGH (ref 70–99)
GLUCOSE SERPL-MCNC: 99 MG/DL — SIGNIFICANT CHANGE UP (ref 70–99)
HCT VFR BLD CALC: 24 % — LOW (ref 34.5–45)
HGB BLD-MCNC: 8.1 G/DL — LOW (ref 11.5–15.5)
IANC: 4.8 K/UL — SIGNIFICANT CHANGE UP (ref 1.8–7.4)
IMM GRANULOCYTES NFR BLD AUTO: 0.8 % — SIGNIFICANT CHANGE UP (ref 0–0.9)
INR BLD: 1.16 RATIO — SIGNIFICANT CHANGE UP (ref 0.88–1.16)
LYMPHOCYTES # BLD AUTO: 0.81 K/UL — LOW (ref 1–3.3)
LYMPHOCYTES # BLD AUTO: 12.6 % — LOW (ref 13–44)
MAGNESIUM SERPL-MCNC: 1.8 MG/DL — SIGNIFICANT CHANGE UP (ref 1.6–2.6)
MAGNESIUM SERPL-MCNC: 1.9 MG/DL — SIGNIFICANT CHANGE UP (ref 1.6–2.6)
MCHC RBC-ENTMCNC: 27 PG — SIGNIFICANT CHANGE UP (ref 27–34)
MCHC RBC-ENTMCNC: 33.8 GM/DL — SIGNIFICANT CHANGE UP (ref 32–36)
MCV RBC AUTO: 80 FL — SIGNIFICANT CHANGE UP (ref 80–100)
MONOCYTES # BLD AUTO: 0.45 K/UL — SIGNIFICANT CHANGE UP (ref 0–0.9)
MONOCYTES NFR BLD AUTO: 7 % — SIGNIFICANT CHANGE UP (ref 2–14)
NEUTROPHILS # BLD AUTO: 4.8 K/UL — SIGNIFICANT CHANGE UP (ref 1.8–7.4)
NEUTROPHILS NFR BLD AUTO: 74.3 % — SIGNIFICANT CHANGE UP (ref 43–77)
NRBC # BLD: 0 /100 WBCS — SIGNIFICANT CHANGE UP (ref 0–0)
NRBC # FLD: 0.03 K/UL — HIGH (ref 0–0)
PHOSPHATE SERPL-MCNC: 4.3 MG/DL — SIGNIFICANT CHANGE UP (ref 2.5–4.5)
PHOSPHATE SERPL-MCNC: 4.7 MG/DL — HIGH (ref 2.5–4.5)
PLATELET # BLD AUTO: 93 K/UL — LOW (ref 150–400)
POTASSIUM SERPL-MCNC: 4.2 MMOL/L — SIGNIFICANT CHANGE UP (ref 3.5–5.3)
POTASSIUM SERPL-MCNC: 4.4 MMOL/L — SIGNIFICANT CHANGE UP (ref 3.5–5.3)
POTASSIUM SERPL-SCNC: 4.2 MMOL/L — SIGNIFICANT CHANGE UP (ref 3.5–5.3)
POTASSIUM SERPL-SCNC: 4.4 MMOL/L — SIGNIFICANT CHANGE UP (ref 3.5–5.3)
PROT SERPL-MCNC: 6.7 G/DL — SIGNIFICANT CHANGE UP (ref 6–8.3)
PROTHROM AB SERPL-ACNC: 13.5 SEC — HIGH (ref 10.5–13.4)
RBC # BLD: 3 M/UL — LOW (ref 3.8–5.2)
RBC # FLD: 14.8 % — HIGH (ref 10.3–14.5)
SODIUM SERPL-SCNC: 131 MMOL/L — LOW (ref 135–145)
SODIUM SERPL-SCNC: 134 MMOL/L — LOW (ref 135–145)
T3 SERPL-MCNC: 66 NG/DL — LOW (ref 80–200)
T4 FREE SERPL-MCNC: 2.5 NG/DL — HIGH (ref 0.9–1.8)
WBC # BLD: 6.45 K/UL — SIGNIFICANT CHANGE UP (ref 3.8–10.5)
WBC # FLD AUTO: 6.45 K/UL — SIGNIFICANT CHANGE UP (ref 3.8–10.5)

## 2023-02-27 PROCEDURE — 99232 SBSQ HOSP IP/OBS MODERATE 35: CPT | Mod: GC

## 2023-02-27 PROCEDURE — 93306 TTE W/DOPPLER COMPLETE: CPT | Mod: 26

## 2023-02-27 PROCEDURE — 99233 SBSQ HOSP IP/OBS HIGH 50: CPT | Mod: GC

## 2023-02-27 PROCEDURE — 99233 SBSQ HOSP IP/OBS HIGH 50: CPT

## 2023-02-27 RX ORDER — HYDRALAZINE HCL 50 MG
50 TABLET ORAL EVERY 8 HOURS
Refills: 0 | Status: DISCONTINUED | OUTPATIENT
Start: 2023-02-27 | End: 2023-03-01

## 2023-02-27 RX ORDER — ACETAMINOPHEN 500 MG
650 TABLET ORAL EVERY 6 HOURS
Refills: 0 | Status: DISCONTINUED | OUTPATIENT
Start: 2023-02-27 | End: 2023-03-01

## 2023-02-27 RX ORDER — LEVOTHYROXINE SODIUM 125 MCG
125 TABLET ORAL DAILY
Refills: 0 | Status: DISCONTINUED | OUTPATIENT
Start: 2023-02-27 | End: 2023-03-01

## 2023-02-27 RX ORDER — LINAGLIPTIN 5 MG/1
5 TABLET, FILM COATED ORAL DAILY
Refills: 0 | Status: DISCONTINUED | OUTPATIENT
Start: 2023-02-27 | End: 2023-03-01

## 2023-02-27 RX ORDER — LINAGLIPTIN 5 MG/1
1 TABLET, FILM COATED ORAL
Qty: 30 | Refills: 0
Start: 2023-02-27 | End: 2023-03-28

## 2023-02-27 RX ADMIN — BUMETANIDE 1 MILLIGRAM(S): 0.25 INJECTION INTRAMUSCULAR; INTRAVENOUS at 17:08

## 2023-02-27 RX ADMIN — Medication 50 MILLIGRAM(S): at 14:21

## 2023-02-27 RX ADMIN — Medication 1: at 17:09

## 2023-02-27 RX ADMIN — Medication 2: at 13:47

## 2023-02-27 RX ADMIN — HEPARIN SODIUM 5000 UNIT(S): 5000 INJECTION INTRAVENOUS; SUBCUTANEOUS at 05:19

## 2023-02-27 RX ADMIN — BUMETANIDE 1 MILLIGRAM(S): 0.25 INJECTION INTRAMUSCULAR; INTRAVENOUS at 05:29

## 2023-02-27 RX ADMIN — PANTOPRAZOLE SODIUM 40 MILLIGRAM(S): 20 TABLET, DELAYED RELEASE ORAL at 05:19

## 2023-02-27 RX ADMIN — Medication 50 MILLIGRAM(S): at 22:11

## 2023-02-27 RX ADMIN — HEPARIN SODIUM 5000 UNIT(S): 5000 INJECTION INTRAVENOUS; SUBCUTANEOUS at 14:20

## 2023-02-27 RX ADMIN — HEPARIN SODIUM 5000 UNIT(S): 5000 INJECTION INTRAVENOUS; SUBCUTANEOUS at 23:08

## 2023-02-27 NOTE — PROGRESS NOTE ADULT - ASSESSMENT
58 yo F w/ hypothyroidism, HTN, T2DM, HLD, CKD, PAH, asthma on albuterol presented to the ED w/ c/o generalized weakness and right sided CP being admitted for management of bradycardia, electrolyte derangements, and ISAMAR on CKD.

## 2023-02-27 NOTE — PROGRESS NOTE ADULT - PROBLEM SELECTOR PLAN 2
B/l Cr unclear but appears to range from 1.5 to 2.0 though in January was 2.0 to 2.3  Cr 2.4 at admission but BUN overtly elevated suggestive of prerenal ISAMAR, perhaps cardiorenal in etiology given CHF and per daughter, increased fluid intake since last admission but equivocal if fluid overloaded; patient herself noting she has not been drinking as much  -bumex x 1; f/u nephro recs   -urine lytes likely to be spurious given recent fluids but will check (FeUrea > 35% suggestive of intrinsic process)  -resume home bumex; strict I/Os; fluid restriction

## 2023-02-27 NOTE — PROGRESS NOTE ADULT - SUBJECTIVE AND OBJECTIVE BOX
Chief Complaint: Hypothyroidism    History: complain of pain with breathing on left chest  was wearing warming blanket overnight, at time of visit patient in chair without warming blanket but temp checked by RN and is low    MEDICATIONS  (STANDING):  buMETAnide 1 milliGRAM(s) Oral two times a day  dextrose 5%. 200 milliLiter(s) (50 mL/Hr) IV Continuous <Continuous>  dextrose 50% Injectable 25 Gram(s) IV Push once  dextrose 50% Injectable 12.5 Gram(s) IV Push once  dextrose 50% Injectable 25 Gram(s) IV Push once  dextrose Oral Gel 15 Gram(s) Oral once  glucagon  Injectable 1 milliGRAM(s) IntraMuscular once  heparin   Injectable 5000 Unit(s) SubCutaneous every 8 hours  hydrALAZINE 50 milliGRAM(s) Oral every 8 hours  influenza   Vaccine 0.5 milliLiter(s) IntraMuscular once  insulin lispro (ADMELOG) corrective regimen sliding scale   SubCutaneous three times a day before meals  insulin lispro (ADMELOG) corrective regimen sliding scale   SubCutaneous at bedtime  levothyroxine Injectable 94 MICROGram(s) IV Push at bedtime  pantoprazole    Tablet 40 milliGRAM(s) Oral before breakfast    MEDICATIONS  (PRN):  acetaminophen     Tablet .. 650 milliGRAM(s) Oral every 6 hours PRN Mild Pain (1 - 3), Moderate Pain (4 - 6)      Allergies    No Known Allergies    Intolerances      Review of Systems:  ALL OTHER SYSTEMS REVIEWED AND NEGATIVE        PHYSICAL EXAM:  VITALS: T(C): 35 (02-27-23 @ 14:27)  T(F): 95 (02-27-23 @ 14:27), Max: 98.2 (02-26-23 @ 17:31)  HR: 62 (02-27-23 @ 14:27) (56 - 82)  BP: 169/64 (02-27-23 @ 14:27) (114/70 - 169/64)  RR:  (15 - 18)  SpO2:  (100% - 100%)  Wt(kg): --  GENERAL: NAD, well-groomed, well-developed  EYES: No proptosis, no lid lag, anicteric  HEENT:  Atraumatic, Normocephalic, moist mucous membranes  RESPIRATORY: nonlabored respirations, no wheezing  PSYCH: Alert and oriented x 3, normal affect, normal mood    CAPILLARY BLOOD GLUCOSE      POCT Blood Glucose.: 208 mg/dL (27 Feb 2023 13:09)  POCT Blood Glucose.: 99 mg/dL (27 Feb 2023 07:40)  POCT Blood Glucose.: 249 mg/dL (26 Feb 2023 22:18)  POCT Blood Glucose.: 206 mg/dL (26 Feb 2023 20:17)  POCT Blood Glucose.: 124 mg/dL (26 Feb 2023 17:01)      02-27    134<L>  |  101  |  100<H>  ----------------------------<  99  4.2   |  20<L>  |  2.10<H>    eGFR: 27<L>    Ca    9.9      02-27  Mg     1.80     02-27  Phos  4.3     02-27    TPro  6.7  /  Alb  3.7  /  TBili  0.4  /  DBili  x   /  AST  60<H>  /  ALT  110<H>  /  AlkPhos  1032<H>  02-27      A1C with Estimated Average Glucose Result: 6.1 % (02-26-23 @ 05:32)  A1C with Estimated Average Glucose Result: 6.9 % (01-17-23 @ 07:10)  A1C with Estimated Average Glucose Result: 9.7 % (11-25-22 @ 14:20)  A1C with Estimated Average Glucose Result: 9.1 % (09-29-22 @ 06:00)  A1C with Estimated Average Glucose Result: 9.2 % (09-28-22 @ 07:47)      Thyroid Function Tests:  02-27 @ 05:42 TSH -- FreeT4 2.5 T3 66 Anti TPO -- Anti Thyroglobulin Ab -- TSI --  02-25 @ 14:13 TSH -- FreeT4 2.4 T3 -- Anti TPO -- Anti Thyroglobulin Ab -- TSI --

## 2023-02-27 NOTE — PROGRESS NOTE ADULT - PROBLEM SELECTOR PLAN 8
hgb 7.2 at admission  No signs of bleeding   stable from prior, likely in setting of ACD/CKD  microcytic  drop to 6.3 2/26 -> 1 unit ordered  -check iron panel -> suggestive of ACD  -active T&S  -transfuse for Hgb < 7  -may benefit from EPO?    Thrombocytopenia  stable from prior  -trend

## 2023-02-27 NOTE — PROGRESS NOTE ADULT - PROBLEM SELECTOR PLAN 3
-Reviewed recent lipid panel-atorvastatin 20 mg started per cardiology given comorbidities  -Plan for repeat lipid panel next visit with cardiology   Last echo in Jan 2023 EF 61%. Mild diastolic dysfunction. RV enlargement and RV systolic dysfunction. Mod pulm HTN  BNP 2919, higher than prior hospitalizations  CXR w/ mild pulm congestion. POCUS w/ some B lines. Lung exam unremarkable  Transaminitis 2/2 to congestive hepatopathy?  gave one dose   - repeat TTE  - strict I/Os  - daily weights  - consider restarting hydral    #chest pain  pleuritic chest pain past few days in left side of chest under breast, not reproducible on palpation  Ekg w/o ischemic changes  no hypoxia or tachycardia to suggest PE  uremic pericarditis on ddx ?  Trop 46  -trend trop to peak  -tele monitoring Last echo in Jan 2023 EF 61%. Mild diastolic dysfunction. RV enlargement and RV systolic dysfunction. Mod pulm HTN  BNP 2919, higher than prior hospitalizations  CXR w/ mild pulm congestion. POCUS w/ some B lines. Lung exam unremarkable  Transaminitis 2/2 to congestive hepatopathy?  gave one dose   - repeat TTE as profound bradycardia at admission  - strict I/Os  - daily weights  - restart hydral for afterload reduction    #chest pain  pleuritic chest pain past few days in left side of chest under breast, not reproducible on palpation  Ekg w/o ischemic changes  no hypoxia or tachycardia to suggest PE  uremic pericarditis on ddx ?  Trop 46  -trend trop to peak  -tele monitoring Last echo in Jan 2023 EF 61%. Mild diastolic dysfunction. RV enlargement and RV systolic dysfunction. Mod pulm HTN  BNP 2919, higher than prior hospitalizations  CXR w/ mild pulm congestion. POCUS w/ some B lines. Lung exam unremarkable  Transaminitis 2/2 to congestive hepatopathy?    - repeat TTE as profound bradycardia at admission  - strict I/Os  - daily weights  - restart hydral for afterload reduction    #chest pain  pleuritic chest pain past few days in left side of chest under breast, not reproducible on palpation  Ekg w/o ischemic changes  no hypoxia or tachycardia to suggest PE  uremic pericarditis on ddx ?  Trop 46  -trend trop to peak  -tele monitoring Last echo in Jan 2023 EF 61%. Mild diastolic dysfunction. RV enlargement and RV systolic dysfunction. Mod pulm HTN  BNP 2919, higher than prior hospitalizations  CXR w/ mild pulm congestion. POCUS w/ some B lines. Lung exam unremarkable  Transaminitis 2/2 to congestive hepatopathy?    - repeat TTE as profound bradycardia at admission  - strict I/Os  - daily weights  - restart hydral for afterload reduction    #chest pain  pleuritic chest pain past few days in left side of chest under breast, at times was reproducible, and is exacerbated with movement to one side  Ekg w/o ischemic changes  no hypoxia or tachycardia to suggest PE  uremic pericarditis on ddx ?  Trop 46  -trend trop to peak  -tele monitoring

## 2023-02-27 NOTE — PROGRESS NOTE ADULT - PROBLEM SELECTOR PLAN 10
on labetalol and hydral at home  pressures ok  -consider restarting hydral on labetalol and hydral at home  pressures ok  -restart hydral

## 2023-02-27 NOTE — PROVIDER CONTACT NOTE (OTHER) - ASSESSMENT
Pt A&Ox4, not complaining of pain or cold.
denies cp, sob, n/v
Rectal temp 95 on admission Recheck post 2 hrs - 94.9 Rectal  and Oral 96.

## 2023-02-27 NOTE — PROGRESS NOTE ADULT - SUBJECTIVE AND OBJECTIVE BOX
[facility] DIVISION OF KIDNEY DISEASES AND HYPERTENSION   FOLLOW UP NOTE  --------------------------------------------------------------------------------  HPI: 56 yo F with history of CKD-3b in the setting of longstanding DM and HTN, HLD, and asthma initially presented with generalized weakness and left sided chest/breast pain. Pt. was found to be bradycardic to 20s and hypothermic to 90.5 F. MICU was consulted. Pt. received IV epinephrine infusion with improvement in vitals. Nephrology was consulted for CKD, and hyperkalemia. Upon review of [place]/[facility], Scr was 2.27 on 1/31/22. Scr initially during this admission was 2.49 on 2/25/23. Pt. received IV Bumex and now on home oral bumex. Pt being seen for ISAMAR and hyperkalemia on CKD.     Pt. was seen and evaluated this morning. She continues to have occasional chest/breast discomfort but denied shortness of breath, nausea, vomiting, fevers, chills, LE swelling.     PAST HISTORY  --------------------------------------------------------------------------------  No significant changes to PMH, PSH, FHx, SHx, unless otherwise noted    ALLERGIES & MEDICATIONS  --------------------------------------------------------------------------------  Allergies  No Known Allergies    Standing Inpatient Medications  buMETAnide 1 milliGRAM(s) Oral two times a day  dextrose 5%. 200 milliLiter(s) IV Continuous <Continuous>  dextrose 50% Injectable 25 Gram(s) IV Push once  dextrose 50% Injectable 12.5 Gram(s) IV Push once  dextrose 50% Injectable 25 Gram(s) IV Push once  dextrose Oral Gel 15 Gram(s) Oral once  glucagon  Injectable 1 milliGRAM(s) IntraMuscular once  heparin   Injectable 5000 Unit(s) SubCutaneous every 8 hours  hydrALAZINE 50 milliGRAM(s) Oral every 8 hours  influenza   Vaccine 0.5 milliLiter(s) IntraMuscular once  insulin lispro (ADMELOG) corrective regimen sliding scale   SubCutaneous three times a day before meals  insulin lispro (ADMELOG) corrective regimen sliding scale   SubCutaneous at bedtime  levothyroxine 125 MICROGram(s) Oral daily  linagliptin 5 milliGRAM(s) Oral daily  pantoprazole    Tablet 40 milliGRAM(s) Oral before breakfast    PRN Inpatient Medications  acetaminophen     Tablet .. 650 milliGRAM(s) Oral every 6 hours PRN    REVIEW OF SYSTEMS  --------------------------------------------------------------------------------  Gen: +Generalized weakness  Skin: No rashes  Respiratory: No dyspnea  CV: See HPI, +L breast/chest pain  GI: No abdominal pain  : No dysuria, hematuria  MSK: No LE edema,   Heme: No easy bruising or bleeding  Psych: No significant depression    All other systems were reviewed and are negative, except as noted.    VITALS/PHYSICAL EXAM  --------------------------------------------------------------------------------  T(C): 35 (02-27-23 @ 14:27), Max: 36.8 (02-26-23 @ 17:31)  HR: 62 (02-27-23 @ 14:27) (56 - 82)  BP: 169/64 (02-27-23 @ 14:27) (114/70 - 169/64)  RR: 18 (02-27-23 @ 14:27) (15 - 19)  SpO2: 100% (02-27-23 @ 14:27) (99% - 100%)  Wt(kg): --  Height (cm): 157.5 (02-26-23 @ 22:00)  Weight (kg): 60 (02-26-23 @ 22:00)  BMI (kg/m2): 24.2 (02-26-23 @ 22:00)  BSA (m2): 1.6 (02-26-23 @ 22:00)      02-26-23 @ 07:01  -  02-27-23 @ 07:00  --------------------------------------------------------  IN: 800 mL / OUT: 1600 mL / NET: -800 mL    Physical Exam:  Gen: resting, NAD  HEENT: MMM  Pulm: CTA B/L  CV: S1S2+  Abd: Soft, +BS   Ext: No LE edema B/L  Neuro: Awake and alert  Skin: Warm and dry  Vascular access: Peripheral IV    LABS/STUDIES  --------------------------------------------------------------------------------              8.1    6.45  >-----------<  93       [02-27-23 @ 05:42]              24.0     134  |  101  |  100  ----------------------------<  99      [02-27-23 @ 05:42]  4.2   |  20  |  2.10        Ca     9.9     [02-27-23 @ 05:42]      Mg     1.80     [02-27-23 @ 05:42]      Phos  4.3     [02-27-23 @ 05:42]    TPro  6.7  /  Alb  3.7  /  TBili  0.4  /  DBili  x   /  AST  60  /  ALT  110  /  AlkPhos  1032  [02-27-23 @ 05:42]    PT/INR: PT 13.5 , INR 1.16       [02-27-23 @ 05:42]  PTT: 49.5       [02-27-23 @ 05:42]    Creatinine Trend:  SCr 2.10 [02-27 @ 05:42]  SCr 2.15 [02-26 @ 23:50]  SCr 2.29 [02-26 @ 14:10]  SCr 2.31 [02-26 @ 05:32]  SCr 2.37 [02-25 @ 22:10]

## 2023-02-27 NOTE — PROGRESS NOTE ADULT - PROBLEM SELECTOR PLAN 9
On Levemir 3 at bedtime and prandin 1 tid   last A1C 6.9% in Jan 23  repeat A1C 6.1%  -check A1c  -dc Lantus as hypoglycemic O/N  -low dose ISS  -FS qAC and qHS

## 2023-02-27 NOTE — PROGRESS NOTE ADULT - PROBLEM SELECTOR PLAN 1
Pt. with CKD stage 3b in the setting of longstanding DM and HTN. Upon review of [place]/[facility], Scr was 2.27 on 1/31/22. Scr was 2.49 on 2/25/23. Pt. received IV Bumex. Scr is elevated/improved at 2.10 today. Documented urine output is 1600 cc over the past 24 hours. UA shows dilute urine (specific gravity: 1.007), and proteinuria. Recommend to continue home/oral diuretic therapy (Bumex 1 mg PO BID). Monitor labs and urine output. Avoid nephrotoxins. Dose medications as per eGFR.

## 2023-02-27 NOTE — PROGRESS NOTE ADULT - PROBLEM SELECTOR PLAN 3
Pt. with resolving hyperkalemia in the setting of CKD. Pt. received medical management with IV insulin/D50, Lokelma, and IV Bumex. Serum potassium stable at 4.2 today. Low potassium diet. Recommend to monitor serum potassium.

## 2023-02-27 NOTE — PROVIDER CONTACT NOTE (OTHER) - SITUATION
Patient temperature 94.7
Pt Admitted to  via ED for Jem. hypothermia with rectal temp 95. Order to Recheck in 2 hrs post Hypothermia [place] and Hot Packs applied.
Pt with low temp; previously on warming blanket

## 2023-02-27 NOTE — PROGRESS NOTE ADULT - PROBLEM SELECTOR PLAN 7
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  hypothermia, bradycardia, fatigue might be related  TSH elevated at 11.51, T3 low at 55, T4 normal  initial concern for hypoadrenalism (given hydrocortisone x 1) given electrolyte abnormalities and clinical picture but cortisol AM not low  given stress dose levothyroxine 300 IV  -endo emailed -> recommending 94 IV levothyroxine QD for now 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  hypothermia, bradycardia, fatigue might be related  TSH elevated at 11.51, T3 low at 55, T4 normal  initial concern for hypoadrenalism (given hydrocortisone x 1) given electrolyte abnormalities and clinical picture but cortisol AM not low  given stress dose levothyroxine 300 IV  -endo emailed -> recommending 94 IV levothyroxine QD for now; repeat T4/T3 q1-2 days

## 2023-02-27 NOTE — PROVIDER CONTACT NOTE (OTHER) - BACKGROUND
New admit to  form ED. Pt Hypothermia and Jem in ED. Pt transported with no Hot Packs or [place] . [place] applied on admission to floor.

## 2023-02-27 NOTE — PROGRESS NOTE ADULT - PROBLEM SELECTOR PLAN 2
Pt. with hyponatremia likely in the setting of volume overload. SNa was initially 121 (2/25). Pt. received IV Bumex and now on home oral bumex. SNa improved to 134 today. Recommend to monitor SNa.

## 2023-02-27 NOTE — PROGRESS NOTE ADULT - PROBLEM SELECTOR PLAN 4
ALP elevation to > 1000. AST and /167  Appears to have chronic ALP elevation and occasional AST/ALT elevation in past but acute rise noted  perhaps in setting of congestive hepatopathy and bradycardia  -trend LFTs -> improving  -hepatitis panel  -RUQ US -> steatosis, gallstones ALP elevation to > 1000. AST and /167  Appears to have chronic ALP elevation and occasional AST/ALT elevation in past but acute rise noted  perhaps in setting of congestive hepatopathy and bradycardia  -trend LFTs -> improving  -hepatitis panel neg  -RUQ US -> steatosis, gallstones

## 2023-02-27 NOTE — PROGRESS NOTE ADULT - ASSESSMENT
57 year old female with history of hypothyroidism, HTN, Dm Type 2, CKD, PAH, asthma on albuterol who presented to the ED with weakness and right sided chest pain found to be bradycardia and hypothermic.   TSH was 11.5 in the setting of previous history of myxedema and then was consulted based on that.   Patient reported compliance with her levothyroxine outpatient. Complex patient high level decision making.    1) Primary Hypothyroidism   -TSH 11.5 (mild elevation), not explaining the current situation of bradycardia and hypothermia  -Furthermore patient given loading dose of levothyroxine 300 mcg IV in ED then 94 mcg IV daily. Free T4 is now elevated at 2.5 (confirmed x 2).  In the setting of elevated free T4 the thyroid cannot be the explanation for the ongoing hypothermia and other medical issues.  -Adrenal insufficiency ruled out given elevated cortisol   -Change back to home dose levothyroxine 125 mcg po daily. Patient reports adherence to this prior to admission.  -Can repeat TFTs in 1 week if remains inpatient on 3/6/23    2) Hypoglycemia   -Likely 2/2 overtreatment of her initial hyperglycemia with regular insulin   GFR 27    3) Type 2 DM with hypoglycemia  Home regimen: Tresiba 3 units qhs and prandin 1mg premeal TID. Patient reports episode of hypoglycemia at home prior to admission with AMS.  -HbA1C of 6.1% outpatient   -While inpatient fasting glucose controlled and some prandial glucose elevations.  -Add linagliptin 5mg po daily  -continue low correction scale premeal and low bedtime scale  DC plan: will consider stopping basal insulin (Tresiba) and change to DPP4i + prandin on dc.    Citlalli Mejia MD  Division of Endocrinology  Pager: [phone]    If after 6PM or before 9AM, or on weekends/holidays, please call endocrine answering service for assistance ([phone]).  For nonurgent matters email [email or web address] for assistance.     57 year old female with history of hypothyroidism, HTN, Dm Type 2, CKD, PAH, asthma on albuterol who presented to the ED with weakness and right sided chest pain found to be bradycardia and hypothermic.   TSH was 11.5 in the setting of previous history of myxedema and then was consulted based on that.   Patient reported compliance with her levothyroxine outpatient. Complex patient high level decision making.    1) Primary Hypothyroidism   -TSH 11.5 (mild elevation), not explaining the current situation of bradycardia and hypothermia  -Furthermore patient given loading dose of levothyroxine 300 mcg IV in ED then 94 mcg IV daily. Free T4 is now elevated at 2.5 (confirmed x 2).  In the setting of elevated free T4 the thyroid cannot be the explanation for the ongoing hypothermia and other medical issues.  -Adrenal insufficiency ruled out given elevated cortisol   -Change back to home dose levothyroxine 125 mcg po daily. Patient reports adherence to this prior to admission.  -Can repeat TFTs in 1 week if remains inpatient on 3/6/23    2) Hypoglycemia   -Likely 2/2 overtreatment of her initial hyperglycemia with regular insulin   GFR 27    3) Type 2 DM with hypoglycemia  Home regimen: Tresiba 3 units qhs and prandin 1mg premeal TID. Patient reports episode of hypoglycemia at home prior to admission with AMS.  -HbA1C of 6.1% outpatient   -While inpatient fasting glucose controlled and some prandial glucose elevations.  -Add linagliptin 5mg po daily  -continue low correction scale premeal and low bedtime scale  DC plan: will consider stopping basal insulin (Tresiba) and change to DPP4i + prandin on dc.  Januvia is covered for dc (would be Januvia 25mg daily).    Citlalli Mejia MD  Division of Endocrinology  Pager: [phone]    If after 6PM or before 9AM, or on weekends/holidays, please call endocrine answering service for assistance ([phone]).  For nonurgent matters email [email or web address] for assistance.

## 2023-02-27 NOTE — PROGRESS NOTE ADULT - SUBJECTIVE AND OBJECTIVE BOX
Sultan Hussein MD  PGY 1 Department of Internal Medicine        Patient is a 57y old  Female who presents with a chief complaint of bradycardia (2023 13:38)      SUBJECTIVE / OVERNIGHT EVENTS: Pt seen and examined. No acute overnight events. Denies fevers, chills, CP, SOB, Abdominal pain, N/V, Constipation, Diarrhea        MEDICATIONS  (STANDING):  buMETAnide 1 milliGRAM(s) Oral two times a day  dextrose 5%. 200 milliLiter(s) (50 mL/Hr) IV Continuous <Continuous>  dextrose 50% Injectable 25 Gram(s) IV Push once  dextrose 50% Injectable 12.5 Gram(s) IV Push once  dextrose 50% Injectable 25 Gram(s) IV Push once  dextrose Oral Gel 15 Gram(s) Oral once  glucagon  Injectable 1 milliGRAM(s) IntraMuscular once  heparin   Injectable 5000 Unit(s) SubCutaneous every 8 hours  influenza   Vaccine 0.5 milliLiter(s) IntraMuscular once  insulin lispro (ADMELOG) corrective regimen sliding scale   SubCutaneous three times a day before meals  insulin lispro (ADMELOG) corrective regimen sliding scale   SubCutaneous at bedtime  levothyroxine Injectable 94 MICROGram(s) IV Push at bedtime  pantoprazole    Tablet 40 milliGRAM(s) Oral before breakfast    MEDICATIONS  (PRN):      I&O's Summary    2023 07:01  -  2023 07:00  --------------------------------------------------------  IN: 0 mL / OUT: 600 mL / NET: -600 mL    2023 07:01  -  2023 05:26  --------------------------------------------------------  IN: 600 mL / OUT: 1000 mL / NET: -400 mL        Vital Signs Last 24 Hrs  T(C): 36.5 (2023 03:00), Max: 36.8 (2023 05:40)  T(F): 97.7 (2023 03:00), Max: 98.3 (2023 13:37)  HR: 66 (2023 03:00) (55 - 66)  BP: 148/68 (2023 03:00) (114/47 - 165/70)  BP(mean): --  RR: 18 (2023 03:00) (14 - 18)  SpO2: 100% (2023 03:00) (100% - 100%)    Parameters below as of 2023 03:00  Patient On (Oxygen Delivery Method): room air        CAPILLARY BLOOD GLUCOSE      POCT Blood Glucose.: 249 mg/dL (2023 22:18)  POCT Blood Glucose.: 206 mg/dL (2023 20:17)  POCT Blood Glucose.: 124 mg/dL (2023 17:01)  POCT Blood Glucose.: 134 mg/dL (2023 12:32)  POCT Blood Glucose.: 182 mg/dL (2023 08:38)  POCT Blood Glucose.: 153 mg/dL (2023 06:45)  POCT Blood Glucose.: 86 mg/dL (2023 06:06)  POCT Blood Glucose.: 58 mg/dL (2023 05:45)  POCT Blood Glucose.: 58 mg/dL (2023 05:43)      PHYSICAL EXAM:  GENERAL APPEARANCE: NAD, alert and awake  HEENT:  PERRL, EOMI. MMM  NECK: Neck supple, non-tender no lymphadenopathy, masses or thyromegaly  CARDIAC: Normal S1 and S2. Systolic murmur. ravinder normal rhythm  LUNGS: Clear to auscultation B/L, no rales, rhonchi, or wheezing appreciated  ABDOMEN: Soft , NTND, bowel sounds normal. No guarding or rebound.   MUSCULOSKELETAL: ROM intact.  No joint erythema or tenderness.   EXTREMITIES: Minimal pitting edema b/l LEs. Peripheral pulses intact.   NEUROLOGICAL: Non focal. Strength exam much improved  SKIN: Warm and dry , Well perfused  PSYCHIATRIC: AOx3        LABS:                        7.7    6.36  )-----------( 82       ( 2023 14:10 )             23.5     Auto Eosinophil # x     / Auto Eosinophil % x     / Auto Neutrophil # x     / Auto Neutrophil % x     / BANDS % x                            6.3    6.31  )-----------( 88       ( 2023 05:32 )             18.9     Auto Eosinophil # x     / Auto Eosinophil % x     / Auto Neutrophil # x     / Auto Neutrophil % x     / BANDS % x                            7.2    6.55  )-----------( 73       ( 2023 10:28 )             21.9     Auto Eosinophil # 0.29  / Auto Eosinophil % 4.4   / Auto Neutrophil # 5.46  / Auto Neutrophil % 82.4  / BANDS % 0.9          131<L>  |  98  |  101<H>  ----------------------------<  224<H>  4.4   |  19<L>  |  2.15<H>      127<L>  |  97<L>  |  107<H>  ----------------------------<  158<H>  4.2   |  17<L>  |  2.29<H>      131<L>  |  99  |  104<H>  ----------------------------<  44<LL>  4.0   |  18<L>  |  2.31<H>    Ca    9.9      2023 23:50  Mg     1.90       Phos  4.7       TPro  6.3  /  Alb  3.7  /  TBili  0.5  /  DBili  x   /  AST  78<H>  /  ALT  122<H>  /  AlkPhos  1050<H>    TPro  6.4  /  Alb  3.4  /  TBili  0.3  /  DBili  x   /  AST  73<H>  /  ALT  120<H>  /  AlkPhos  912<H>    TPro  7.3  /  Alb  4.0  /  TBili  0.6  /  DBili  x   /  AST  137<H>  /  ALT  167<H>  /  AlkPhos  1137<H>      PT/INR - ( 2023 05:32 )   PT: 13.5 sec;   INR: 1.16 ratio         PTT - ( 2023 05:32 )  PTT:41.3 sec      Urinalysis Basic - ( 2023 12:20 )    Color: Light Yellow / Appearance: Clear / S.007 / pH: x  Gluc: x / Ketone: Negative  / Bili: Negative / Urobili: <2 mg/dL   Blood: x / Protein: 30 mg/dL / Nitrite: Negative   Leuk Esterase: Negative / RBC: 2-5 /HPF / WBC 1-3 /HPF   Sq Epi: x / Non Sq Epi: Few / Bacteria: Occasional            RADIOLOGY & ADDITIONAL TESTS:    Imaging Personally Reviewed:    Consultant(s) Notes Reviewed:      Care Discussed with Consultants/Other Providers:   Sultan Hussein MD  PGY 1 Department of Internal Medicine        Patient is a 57y old  Female who presents with a chief complaint of bradycardia (2023 13:38)      SUBJECTIVE / OVERNIGHT EVENTS: Pt seen and examined. Hypothermic O/N, warming blanket corrected temp. Denies fevers, chills, CP, SOB, Abdominal pain, N/V, Constipation, Diarrhea. Notes persistent pain under the left breast, but only with deep breaths and when she turns to left side. Feels overall well and energy has been improving, was able to walk to bathroom. I/O -800. Tele sinus ravinder to sinus normal rate, rate improving through night.         MEDICATIONS  (STANDING):  buMETAnide 1 milliGRAM(s) Oral two times a day  dextrose 5%. 200 milliLiter(s) (50 mL/Hr) IV Continuous <Continuous>  dextrose 50% Injectable 25 Gram(s) IV Push once  dextrose 50% Injectable 12.5 Gram(s) IV Push once  dextrose 50% Injectable 25 Gram(s) IV Push once  dextrose Oral Gel 15 Gram(s) Oral once  glucagon  Injectable 1 milliGRAM(s) IntraMuscular once  heparin   Injectable 5000 Unit(s) SubCutaneous every 8 hours  influenza   Vaccine 0.5 milliLiter(s) IntraMuscular once  insulin lispro (ADMELOG) corrective regimen sliding scale   SubCutaneous three times a day before meals  insulin lispro (ADMELOG) corrective regimen sliding scale   SubCutaneous at bedtime  levothyroxine Injectable 94 MICROGram(s) IV Push at bedtime  pantoprazole    Tablet 40 milliGRAM(s) Oral before breakfast    MEDICATIONS  (PRN):      I&O's Summary    2023 07:  -  2023 07:00  --------------------------------------------------------  IN: 0 mL / OUT: 600 mL / NET: -600 mL    2023 07:01  -  2023 05:26  --------------------------------------------------------  IN: 600 mL / OUT: 1000 mL / NET: -400 mL        Vital Signs Last 24 Hrs  T(C): 36.5 (2023 03:00), Max: 36.8 (2023 05:40)  T(F): 97.7 (2023 03:00), Max: 98.3 (2023 13:37)  HR: 66 (2023 03:00) (55 - 66)  BP: 148/68 (2023 03:00) (114/47 - 165/70)  BP(mean): --  RR: 18 (2023 03:00) (14 - 18)  SpO2: 100% (2023 03:00) (100% - 100%)    Parameters below as of 2023 03:00  Patient On (Oxygen Delivery Method): room air        CAPILLARY BLOOD GLUCOSE      POCT Blood Glucose.: 249 mg/dL (2023 22:18)  POCT Blood Glucose.: 206 mg/dL (2023 20:17)  POCT Blood Glucose.: 124 mg/dL (2023 17:01)  POCT Blood Glucose.: 134 mg/dL (2023 12:32)  POCT Blood Glucose.: 182 mg/dL (2023 08:38)  POCT Blood Glucose.: 153 mg/dL (2023 06:45)  POCT Blood Glucose.: 86 mg/dL (2023 06:06)  POCT Blood Glucose.: 58 mg/dL (2023 05:45)  POCT Blood Glucose.: 58 mg/dL (2023 05:43)      PHYSICAL EXAM:  GENERAL APPEARANCE: NAD, alert and awake  HEENT:  PERRL, EOMI. MMM  NECK: Neck supple, non-tender no lymphadenopathy, masses or thyromegaly  CARDIAC: Normal S1 and S2. Systolic murmur. RRR  LUNGS: Clear to auscultation B/L, no rales, rhonchi, or wheezing appreciated  ABDOMEN: Soft , NTND, bowel sounds normal. No guarding or rebound.   MUSCULOSKELETAL: ROM intact.  No joint erythema or tenderness.   EXTREMITIES: No pitting edema b/l LEs. Peripheral pulses intact.   NEUROLOGICAL: Non focal. Strength exam much improved  SKIN: Warm and dry , Well perfused  PSYCHIATRIC: AOx3        LABS:                        7.7    6.36  )-----------( 82       ( 2023 14:10 )             23.5     Auto Eosinophil # x     / Auto Eosinophil % x     / Auto Neutrophil # x     / Auto Neutrophil % x     / BANDS % x                            6.3    6.31  )-----------( 88       ( 2023 05:32 )             18.9     Auto Eosinophil # x     / Auto Eosinophil % x     / Auto Neutrophil # x     / Auto Neutrophil % x     / BANDS % x                            7.2    6.55  )-----------( 73       ( 2023 10:28 )             21.9     Auto Eosinophil # 0.29  / Auto Eosinophil % 4.4   / Auto Neutrophil # 5.46  / Auto Neutrophil % 82.4  / BANDS % 0.9          131<L>  |  98  |  101<H>  ----------------------------<  224<H>  4.4   |  19<L>  |  2.15<H>      127<L>  |  97<L>  |  107<H>  ----------------------------<  158<H>  4.2   |  17<L>  |  2.29<H>      131<L>  |  99  |  104<H>  ----------------------------<  44<LL>  4.0   |  18<L>  |  2.31<H>    Ca    9.9      2023 23:50  Mg     1.90       Phos  4.7       TPro  6.3  /  Alb  3.7  /  TBili  0.5  /  DBili  x   /  AST  78<H>  /  ALT  122<H>  /  AlkPhos  1050<H>    TPro  6.4  /  Alb  3.4  /  TBili  0.3  /  DBili  x   /  AST  73<H>  /  ALT  120<H>  /  AlkPhos  912<H>    TPro  7.3  /  Alb  4.0  /  TBili  0.6  /  DBili  x   /  AST  137<H>  /  ALT  167<H>  /  AlkPhos  1137<H>      PT/INR - ( 2023 05:32 )   PT: 13.5 sec;   INR: 1.16 ratio         PTT - ( 2023 05:32 )  PTT:41.3 sec      Urinalysis Basic - ( 2023 12:20 )    Color: Light Yellow / Appearance: Clear / S.007 / pH: x  Gluc: x / Ketone: Negative  / Bili: Negative / Urobili: <2 mg/dL   Blood: x / Protein: 30 mg/dL / Nitrite: Negative   Leuk Esterase: Negative / RBC: 2-5 /HPF / WBC 1-3 /HPF   Sq Epi: x / Non Sq Epi: Few / Bacteria: Occasional            RADIOLOGY & ADDITIONAL TESTS:    Imaging Personally Reviewed:    Consultant(s) Notes Reviewed:      Care Discussed with Consultants/Other Providers:

## 2023-02-27 NOTE — PROGRESS NOTE ADULT - PROBLEM SELECTOR PLAN 1
has been chronically ravinder at home since last admission, perhaps precipitated by poorly controlled hypothyroidism (though as per daughter has been taking medications) and use of labetalol at home  now in 50s  Ekg sinus ravinder w/ first degree AV block  responded to tx of hypothermia  -manage hypothyroidism  -c/w bearhugger  -hold BB  -if ravinder < 40 address reversible causes and then can consider atropine -> consider EP/MICU eval; MICU suggesting dopamine infusion if ravinder  if unstable, ACLS  -tele monitoring    #Hypothermia  ~90 on admission now normothermic s/p blanket  Unclear why patient so hypothermic; manifestation of myxedema coma?  possible infection but no leukocytosis or infectious symptoms. few bacteria on U/A but no other findings on U/A suggestive of infection or patient/family noting urinary symptoms  hypoglycemica noted O/N -> possible contribution (as per patient though sugars are ok at home)  -bear hugger  -endo recs  -f/u BCx drawn in ED  -monitor off Abx

## 2023-02-28 ENCOUNTER — TRANSCRIPTION ENCOUNTER (OUTPATIENT)
Age: 58
End: 2023-02-28

## 2023-02-28 LAB
ALBUMIN SERPL ELPH-MCNC: 3.6 G/DL — SIGNIFICANT CHANGE UP (ref 3.3–5)
ALP SERPL-CCNC: 966 U/L — HIGH (ref 40–120)
ALT FLD-CCNC: 87 U/L — HIGH (ref 4–33)
ANION GAP SERPL CALC-SCNC: 14 MMOL/L — SIGNIFICANT CHANGE UP (ref 7–14)
AST SERPL-CCNC: 40 U/L — HIGH (ref 4–32)
BASOPHILS # BLD AUTO: 0.02 K/UL — SIGNIFICANT CHANGE UP (ref 0–0.2)
BASOPHILS NFR BLD AUTO: 0.3 % — SIGNIFICANT CHANGE UP (ref 0–2)
BILIRUB SERPL-MCNC: 0.4 MG/DL — SIGNIFICANT CHANGE UP (ref 0.2–1.2)
BUN SERPL-MCNC: 86 MG/DL — HIGH (ref 7–23)
CALCIUM SERPL-MCNC: 9.6 MG/DL — SIGNIFICANT CHANGE UP (ref 8.4–10.5)
CHLORIDE SERPL-SCNC: 100 MMOL/L — SIGNIFICANT CHANGE UP (ref 98–107)
CO2 SERPL-SCNC: 20 MMOL/L — LOW (ref 22–31)
CREAT SERPL-MCNC: 1.88 MG/DL — HIGH (ref 0.5–1.3)
EGFR: 31 ML/MIN/1.73M2 — LOW
EOSINOPHIL # BLD AUTO: 0.37 K/UL — SIGNIFICANT CHANGE UP (ref 0–0.5)
EOSINOPHIL NFR BLD AUTO: 5.1 % — SIGNIFICANT CHANGE UP (ref 0–6)
GLUCOSE SERPL-MCNC: 85 MG/DL — SIGNIFICANT CHANGE UP (ref 70–99)
HCT VFR BLD CALC: 25.4 % — LOW (ref 34.5–45)
HGB BLD-MCNC: 8.2 G/DL — LOW (ref 11.5–15.5)
IANC: 5.06 K/UL — SIGNIFICANT CHANGE UP (ref 1.8–7.4)
IMM GRANULOCYTES NFR BLD AUTO: 0.7 % — SIGNIFICANT CHANGE UP (ref 0–0.9)
LYMPHOCYTES # BLD AUTO: 1.3 K/UL — SIGNIFICANT CHANGE UP (ref 1–3.3)
LYMPHOCYTES # BLD AUTO: 17.8 % — SIGNIFICANT CHANGE UP (ref 13–44)
MAGNESIUM SERPL-MCNC: 1.7 MG/DL — SIGNIFICANT CHANGE UP (ref 1.6–2.6)
MCHC RBC-ENTMCNC: 26.2 PG — LOW (ref 27–34)
MCHC RBC-ENTMCNC: 32.3 GM/DL — SIGNIFICANT CHANGE UP (ref 32–36)
MCV RBC AUTO: 81.2 FL — SIGNIFICANT CHANGE UP (ref 80–100)
MONOCYTES # BLD AUTO: 0.51 K/UL — SIGNIFICANT CHANGE UP (ref 0–0.9)
MONOCYTES NFR BLD AUTO: 7 % — SIGNIFICANT CHANGE UP (ref 2–14)
NEUTROPHILS # BLD AUTO: 5.06 K/UL — SIGNIFICANT CHANGE UP (ref 1.8–7.4)
NEUTROPHILS NFR BLD AUTO: 69.1 % — SIGNIFICANT CHANGE UP (ref 43–77)
NRBC # BLD: 0 /100 WBCS — SIGNIFICANT CHANGE UP (ref 0–0)
NRBC # FLD: 0 K/UL — SIGNIFICANT CHANGE UP (ref 0–0)
PHOSPHATE SERPL-MCNC: 3.7 MG/DL — SIGNIFICANT CHANGE UP (ref 2.5–4.5)
PLATELET # BLD AUTO: 113 K/UL — LOW (ref 150–400)
POTASSIUM SERPL-MCNC: 4 MMOL/L — SIGNIFICANT CHANGE UP (ref 3.5–5.3)
POTASSIUM SERPL-SCNC: 4 MMOL/L — SIGNIFICANT CHANGE UP (ref 3.5–5.3)
PROT SERPL-MCNC: 6.9 G/DL — SIGNIFICANT CHANGE UP (ref 6–8.3)
RBC # BLD: 3.13 M/UL — LOW (ref 3.8–5.2)
RBC # FLD: 15.3 % — HIGH (ref 10.3–14.5)
SODIUM SERPL-SCNC: 134 MMOL/L — LOW (ref 135–145)
WBC # BLD: 7.31 K/UL — SIGNIFICANT CHANGE UP (ref 3.8–10.5)
WBC # FLD AUTO: 7.31 K/UL — SIGNIFICANT CHANGE UP (ref 3.8–10.5)

## 2023-02-28 PROCEDURE — 99232 SBSQ HOSP IP/OBS MODERATE 35: CPT | Mod: GC

## 2023-02-28 PROCEDURE — 99233 SBSQ HOSP IP/OBS HIGH 50: CPT

## 2023-02-28 RX ORDER — SITAGLIPTIN 50 MG/1
1 TABLET, FILM COATED ORAL
Qty: 30 | Refills: 0
Start: 2023-02-28 | End: 2023-03-29

## 2023-02-28 RX ADMIN — BUMETANIDE 1 MILLIGRAM(S): 0.25 INJECTION INTRAMUSCULAR; INTRAVENOUS at 05:12

## 2023-02-28 RX ADMIN — Medication 50 MILLIGRAM(S): at 22:22

## 2023-02-28 RX ADMIN — HEPARIN SODIUM 5000 UNIT(S): 5000 INJECTION INTRAVENOUS; SUBCUTANEOUS at 22:22

## 2023-02-28 RX ADMIN — BUMETANIDE 1 MILLIGRAM(S): 0.25 INJECTION INTRAMUSCULAR; INTRAVENOUS at 17:00

## 2023-02-28 RX ADMIN — HEPARIN SODIUM 5000 UNIT(S): 5000 INJECTION INTRAVENOUS; SUBCUTANEOUS at 05:12

## 2023-02-28 RX ADMIN — Medication 50 MILLIGRAM(S): at 12:27

## 2023-02-28 RX ADMIN — HEPARIN SODIUM 5000 UNIT(S): 5000 INJECTION INTRAVENOUS; SUBCUTANEOUS at 12:27

## 2023-02-28 RX ADMIN — Medication 1: at 17:00

## 2023-02-28 RX ADMIN — Medication 2: at 11:48

## 2023-02-28 RX ADMIN — Medication 125 MICROGRAM(S): at 05:12

## 2023-02-28 RX ADMIN — LINAGLIPTIN 5 MILLIGRAM(S): 5 TABLET, FILM COATED ORAL at 12:26

## 2023-02-28 RX ADMIN — PANTOPRAZOLE SODIUM 40 MILLIGRAM(S): 20 TABLET, DELAYED RELEASE ORAL at 05:12

## 2023-02-28 RX ADMIN — Medication 50 MILLIGRAM(S): at 05:12

## 2023-02-28 NOTE — DISCHARGE NOTE PROVIDER - NSDCFUADDINST_GEN_ALL_CORE_FT
Please limit your fluid intake to 1.5 L daily. Also follow your nephrologist's instructions to reduce the amount of potassium in your diet.

## 2023-02-28 NOTE — DISCHARGE NOTE PROVIDER - CARE PROVIDER_API CALL
Bryan Fernandez; MD)  Medicine  [id number] [place] [address]  Phone: [phone]  Fax: [phone]  Established Patient  Follow Up Time: 2 weeks

## 2023-02-28 NOTE — PROGRESS NOTE ADULT - PROBLEM SELECTOR PLAN 2
B/l Cr unclear but appears to range from 1.5 to 2.0 though in January was 2.0 to 2.3  Cr 2.4 at admission but BUN overtly elevated suggestive of prerenal ISAMAR, perhaps cardiorenal in etiology given CHF and per daughter, increased fluid intake since last admission but equivocal if fluid overloaded; patient herself noting she has not been drinking as much  -bumex x 1; f/u nephro recs   -urine lytes likely to be spurious given recent fluids but will check (FeUrea > 35% suggestive of intrinsic process)  -resume home bumex; strict I/Os; fluid restriction B/l Cr unclear but appears to range from 1.5 to 2.0 though in January was 2.0 to 2.3  Cr 2.4 at admission but BUN overtly elevated suggestive of prerenal ISAMAR, perhaps cardiorenal in etiology given CHF and per daughter, increased fluid intake since last admission but equivocal if fluid overloaded; patient herself noting she has not been drinking as much  Improving  -bumex x 1; f/u nephro recs   -urine lytes likely to be spurious given recent fluids but will check (FeUrea > 35% suggestive of intrinsic process)  -resume home bumex; strict I/Os; fluid restriction

## 2023-02-28 NOTE — PROGRESS NOTE ADULT - PROBLEM SELECTOR PLAN 8
hgb 7.2 at admission  No signs of bleeding   stable from prior, likely in setting of ACD/CKD  microcytic  drop to 6.3 2/26 -> 1 unit ordered  -check iron panel -> suggestive of ACD  -active T&S  -transfuse for Hgb < 7  -may benefit from EPO?    Thrombocytopenia  stable from prior  -trend hgb 7.2 at admission  No signs of bleeding   stable from prior, likely in setting of ACD/CKD  microcytic  drop to 6.3 2/26 -> 1 unit ordered  -check iron panel -> suggestive of ACD  -active T&S  -transfuse for Hgb < 7    Thrombocytopenia  stable from prior  -trend hgb 7.2 at admission  No signs of bleeding   stable from prior, likely in setting of ACD/CKD  microcytic  drop to 6.3 2/26 -> 1 unit ordered  -check iron panel -> suggestive of ACD  -active T&S  -transfuse for Hgb < 7    Thrombocytopenia  stable from prior  could be insetting of hypothermia (bone marrow sequestration)  -trend

## 2023-02-28 NOTE — PROGRESS NOTE ADULT - PROBLEM SELECTOR PLAN 3
Pt. with resolving hyperkalemia in the setting of CKD. Pt. received medical management with IV insulin/D50, Lokelma, and IV Bumex. Now on home oral bumex. Serum potassium stable at 4.0 today. Low potassium diet. Recommend to monitor serum potassium.

## 2023-02-28 NOTE — PROGRESS NOTE ADULT - PROBLEM SELECTOR PLAN 7
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  hypothermia, bradycardia, fatigue might be related  TSH elevated at 11.51, T3 low at 55, T4 normal  initial concern for hypoadrenalism (given hydrocortisone x 1) given electrolyte abnormalities and clinical picture but cortisol AM not low  given stress dose levothyroxine 300 IV  -endo emailed -> recommending 94 IV levothyroxine -> switched back to home PO dose 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

## 2023-02-28 NOTE — PROGRESS NOTE ADULT - PROBLEM SELECTOR PLAN 2
Pt. with hyponatremia likely in the setting of volume overload. SNa was initially 121 (2/25). Pt. received IV Bumex and now on home oral bumex. SNa improved/stable to 134 today. Recommend to monitor SNa.

## 2023-02-28 NOTE — PROGRESS NOTE ADULT - PROBLEM SELECTOR PLAN 9
On Levemir 3 at bedtime and prandin 1 tid   last A1C 6.9% in Jan 23  repeat A1C 6.1%  -check A1c  -dc Lantus as hypoglycemic O/N  -low dose ISS  -FS qAC and qHS On Levemir 3 at bedtime and prandin 1 tid   last A1C 6.9% in Jan 23  repeat A1C 6.1%  -check A1c  -dc Lantus as hypoglycemic O/N  -low dose ISS  -FS qAC and qHS  -start Tradjenta in hospital; can do Januvia on dc, will price check On Levemir 3 at bedtime and prandin 1 tid   last A1C 6.9% in Jan 23  repeat A1C 6.1%  -check A1c  -dc Lantus as hypoglycemic O/N  -low dose ISS  -FS qAC and qHS  -start Tradjenta in hospital; can do Januvia on dc, will price check (~20 dollars/month)

## 2023-02-28 NOTE — PHYSICAL THERAPY INITIAL EVALUATION ADULT - PERTINENT HX OF CURRENT PROBLEM, REHAB EVAL
58 y/o Female w/ hypothyroidism, HTN, T2DM, HLD, CKD, PAH, asthma on albuterol presented to the ED w/ c/o generalized weakness and right sided CP, admitted for management of bradycardia, electrolyte derangements, and ISAMAR on CKD

## 2023-02-28 NOTE — DISCHARGE NOTE PROVIDER - NSDCCPCAREPLAN_GEN_ALL_CORE_FT
PRINCIPAL DISCHARGE DIAGNOSIS  Diagnosis: Symptomatic bradycardia  Assessment and Plan of Treatment: When you came in, your heart rate was dangerously low (in the 20s) requiring us to give you some medications to bring it up. We also tried to correct reasons that could contribute to your heart rate going down including addressing low body temperature, decreasing high potassium levels, and stopping medications that slow HR (labetalol). Once we addressed these factors, your heart rate came up into normal range. Please stop taking labetalol; you will have to discuss with your PCP and cardiologist alternative medications you can take for high blood pressure. You also expressed an interest in monitoring your heart rate; you can purchase a pulse oximeter at the pharmacy.      SECONDARY DISCHARGE DIAGNOSES  Diagnosis: Anemia  Assessment and Plan of Treatment: You chronically have low blood levels, likely because of your renal disease. Your hemoglobin dropped below 7 during your hospital course so we gave you one transfusion and thereafter, your levels were stable. You should discuss with your nephrologist if you are a candidate to receive any medications to boost your blood levels.    Diagnosis: Acute kidney injury superimposed on CKD  Assessment and Plan of Treatment: When you came in, your kidney function was noted to be worse than your usual. This was accompanied by low sodium levels and high potassium levels, which were likely caused by the worsening kidney function. This was likely because you were retaining some fluid in your system. We gave you some IV medication to make you urinate and resumed your home Bumex. With this treatment your kidney function began to improve and your sodium and potassium levels normalized as well. It is really important that you continue to take your Bumex as prescribed, limit your fluid intake daily to 1.5L, and follow nephrology's recommendations to limit your potassium intake. Please follow-up with nephrology within two weeks.    Diagnosis: Congestive heart failure  Assessment and Plan of Treatment: Because of your really slow heart rate, we wanted to image your heart again. It showed that you have very high pressures in your lung's arteries, more so than was present in your imaging done in January. You should follow-up with your pulmonologist and cardiologsit within two weeks to discuss this finding.    Diagnosis: Transaminitis  Assessment and Plan of Treatment: Your liver function was noted to be worse than usual. This is likely because of the slow heart rate decreasing blood flow to your liver. Once your heart rate was corrected and we got rid of the extra fluid in your body, your liver function improved. It is not completely back to normal so you should follow-up with your PCP within two weeks to monitor with further labwork.    Diagnosis: Type 2 diabetes mellitus  Assessment and Plan of Treatment: You had low blood sugars noted at one point of your hospitalization and your daughter noted that you had an episode at home as well. As per endocrinology, we will be discharging you on Januvia 25 mg daily and stopping your nightly Tresiba. You can continue to take your Prandin as prescribed. Please follow up with endocrinology at your scheduled appointment on 3/2.    Diagnosis: At risk for hypothermia  Assessment and Plan of Treatment: You came in with a very low body temperature and we had to place a warming blanket on you to bring your temperature up. Low body temperature is dangerous because it can cause low heart rate and slow down your body's metabolism. It is still unclear why your temperature was so low though it improved as we addressed your other medical issues. You should discuss this with your PCP to see if further workup is warranted.    Diagnosis: Hypothyroidism  Assessment and Plan of Treatment: You have a history of hypothyroidism and initially there was concern that the symptoms you presented with were related to hypothyroidism. The endocrinologist saw you and did not think this was the case; we initially treated you with IV levothyroxine but eventually changed you back to your home oral dose. The endocrinologists are recommending that you have your thyroid function tests be repeated on 3/6.     PRINCIPAL DISCHARGE DIAGNOSIS  Diagnosis: Symptomatic bradycardia  Assessment and Plan of Treatment: When you came in, your heart rate was dangerously low (in the 20s) requiring us to give you some medications to bring it up. We also tried to correct reasons that could contribute to your heart rate going down including addressing low body temperature, decreasing high potassium levels, and stopping medications that slow HR (labetalol). Once we addressed these factors, your heart rate came up into normal range. Please stop taking labetalol; you will have to discuss with your PCP and cardiologist alternative medications you can take for high blood pressure. You also expressed an interest in monitoring your heart rate; you can purchase a pulse oximeter at the pharmacy.      SECONDARY DISCHARGE DIAGNOSES  Diagnosis: Anemia  Assessment and Plan of Treatment: You chronically have low blood levels, likely because of your renal disease. Your hemoglobin dropped below 7 during your hospital course so we gave you one transfusion and thereafter, your levels were stable. You should discuss with your nephrologist if you are a candidate to receive any medications to boost your blood levels.    Diagnosis: Acute kidney injury superimposed on CKD  Assessment and Plan of Treatment: When you came in, your kidney function was noted to be worse than your usual. This was accompanied by low sodium levels and high potassium levels, which were likely caused by the worsening kidney function. This was likely because you were retaining some fluid in your system. We gave you some IV medication to make you urinate and resumed your home Bumex. With this treatment your kidney function began to improve and your sodium and potassium levels normalized as well. It is really important that you continue to take your Bumex as prescribed, limit your fluid intake daily to 1.5L, and follow nephrology's recommendations to limit your potassium intake. Please follow-up with nephrology within two weeks.    Diagnosis: Congestive heart failure  Assessment and Plan of Treatment: Because of your really slow heart rate, we wanted to image your heart again. It showed that you have very high pressures in your lung's arteries, more so than was present in your imaging done in January. You should follow-up with your pulmonologist and cardiologsit within two weeks to discuss this finding.    Diagnosis: Transaminitis  Assessment and Plan of Treatment: Your liver function was noted to be worse than usual. This is likely because of the slow heart rate decreasing blood flow to your liver. Once your heart rate was corrected and we got rid of the extra fluid in your body, your liver function improved. It is not completely back to normal so you should follow-up with your PCP within two weeks to monitor with further labwork.    Diagnosis: Type 2 diabetes mellitus  Assessment and Plan of Treatment: You had low blood sugars noted at one point of your hospitalization and your daughter noted that you had an episode at home as well. As per endocrinology, we will be discharging you on Januvia 25 mg daily and stopping your nightly Tresiba. You should reduce the amount of Prandin you take to 0.5 mg three times a day before meals (do not take pill if you skip a meal). Please follow up with endocrinology within a month.    Diagnosis: At risk for hypothermia  Assessment and Plan of Treatment: You came in with a very low body temperature and we had to place a warming blanket on you to bring your temperature up. Low body temperature is dangerous because it can cause low heart rate and slow down your body's metabolism. It is still unclear why your temperature was so low though it improved as we addressed your other medical issues. You should discuss this with your PCP to see if further workup is warranted.    Diagnosis: Hypothyroidism  Assessment and Plan of Treatment: You have a history of hypothyroidism and initially there was concern that the symptoms you presented with were related to hypothyroidism. The endocrinologist saw you and did not think this was the case; we initially treated you with IV levothyroxine but eventually changed you back to your home oral dose. The endocrinologists are recommending that you have your thyroid function tests be repeated in 4-6 weeks with your PCP.

## 2023-02-28 NOTE — PROGRESS NOTE ADULT - PROBLEM SELECTOR PLAN 3
Last echo in Jan 2023 EF 61%. Mild diastolic dysfunction. RV enlargement and RV systolic dysfunction. Mod pulm HTN  BNP 2919, higher than prior hospitalizations  CXR w/ mild pulm congestion. POCUS w/ some B lines. Lung exam unremarkable  Transaminitis 2/2 to congestive hepatopathy?    - repeat TTE as profound bradycardia at admission  - strict I/Os  - daily weights  - restart hydral for afterload reduction    #chest pain  pleuritic chest pain past few days in left side of chest under breast, at times was reproducible, and is exacerbated with movement to one side  Ekg w/o ischemic changes  no hypoxia or tachycardia to suggest PE  uremic pericarditis on ddx ?  Trop 46  -trend trop to peak  -tele monitoring Last echo in Jan 2023 EF 61%. Mild diastolic dysfunction. RV enlargement and RV systolic dysfunction. Mod pulm HTN  BNP 2919, higher than prior hospitalizations  CXR w/ mild pulm congestion. POCUS w/ some B lines. Lung exam unremarkable  Transaminitis 2/2 to congestive hepatopathy?    - repeat TTE as profound bradycardia at admission -> severe PH and mod-severe TR, some hepatojugular reflux on exam?  - strict I/Os  - daily weights  - restart hydral for afterload reduction    #chest pain  pleuritic chest pain past few days in left side of chest under breast, at times was reproducible, and is exacerbated with movement to one side  Ekg w/o ischemic changes  no hypoxia or tachycardia to suggest PE  Trops peaked  -tele monitoring Last echo in Jan 2023 EF 61%. Mild diastolic dysfunction. RV enlargement and RV systolic dysfunction. Mod pulm HTN  BNP 2919, higher than prior hospitalizations  CXR w/ mild pulm congestion. POCUS w/ some B lines. Lung exam unremarkable  Transaminitis 2/2 to congestive hepatopathy?    - repeat TTE as profound bradycardia at admission -> severe PH and mod-severe TR, some hepatojugular reflux on exam? -> outpatient cards and pulm f/u  - strict I/Os  - daily weights  - restart hydral for afterload reduction    #chest pain  pleuritic chest pain past few days in left side of chest under breast, at times was reproducible, and is exacerbated with movement to one side  Ekg w/o ischemic changes  no hypoxia or tachycardia to suggest PE  Trops peaked  -tele monitoring Last echo in Jan 2023 EF 61%. Mild diastolic dysfunction. RV enlargement and RV systolic dysfunction. Mod pulm HTN  BNP 2919, higher than prior hospitalizations  CXR w/ mild pulm congestion. POCUS w/ some B lines. Lung exam unremarkable  Transaminitis 2/2 to congestive hepatopathy?    - repeat TTE as profound bradycardia at admission -> severe PH and mod-severe TR -> outpatient cards and pulm f/u  - strict I/Os  - daily weights  - restart hydral for afterload reduction    #chest pain  pleuritic chest pain past few days in left side of chest under breast, at times was reproducible, and is exacerbated with movement to one side  Ekg w/o ischemic changes  no hypoxia or tachycardia to suggest PE  Trops peaked  -tele monitoring

## 2023-02-28 NOTE — PROGRESS NOTE ADULT - PROBLEM SELECTOR PLAN 1
Pt. with CKD stage 3b in the setting of longstanding DM and HTN. Upon review of [facility], Scr was 2.27 on 1/31/22. Scr was 2.49 on 2/25/23. Pt. received IV Bumex. Scr is elevated/improved at 2.10 today. Documented urine output is 980cc over the past 24 hours. UA shows dilute urine (specific gravity: 1.007), and proteinuria. Recommend to continue home/oral diuretic therapy (Bumex 1 mg PO BID). Monitor labs and urine output. Avoid nephrotoxins. Dose medications as per eGFR.

## 2023-02-28 NOTE — PROGRESS NOTE ADULT - PROBLEM SELECTOR PLAN 11
Diet: CC/DASH/TLC  DVT PPx: SQH  Dispo: pending clinical course Diet: CC/DASH/TLC  DVT PPx: SQ[facility]  Dispo: pending clinical course and PT eval

## 2023-02-28 NOTE — PROGRESS NOTE ADULT - SUBJECTIVE AND OBJECTIVE BOX
Sultan Hussein MD  PGY 1 Department of Internal Medicine        Patient is a 57y old  Female who presents with a chief complaint of bradycardia (27 Feb 2023 16:53)      SUBJECTIVE / OVERNIGHT EVENTS: Pt seen and examined. No acute overnight events. Denies fevers, chills, CP, SOB, Abdominal pain, N/V, Constipation, Diarrhea        MEDICATIONS  (STANDING):  buMETAnide 1 milliGRAM(s) Oral two times a day  dextrose 5%. 200 milliLiter(s) (50 mL/Hr) IV Continuous <Continuous>  dextrose 50% Injectable 25 Gram(s) IV Push once  dextrose 50% Injectable 12.5 Gram(s) IV Push once  dextrose 50% Injectable 25 Gram(s) IV Push once  dextrose Oral Gel 15 Gram(s) Oral once  glucagon  Injectable 1 milliGRAM(s) IntraMuscular once  heparin   Injectable 5000 Unit(s) SubCutaneous every 8 hours  hydrALAZINE 50 milliGRAM(s) Oral every 8 hours  influenza   Vaccine 0.5 milliLiter(s) IntraMuscular once  insulin lispro (ADMELOG) corrective regimen sliding scale   SubCutaneous three times a day before meals  insulin lispro (ADMELOG) corrective regimen sliding scale   SubCutaneous at bedtime  levothyroxine 125 MICROGram(s) Oral daily  linagliptin 5 milliGRAM(s) Oral daily  pantoprazole    Tablet 40 milliGRAM(s) Oral before breakfast    MEDICATIONS  (PRN):  acetaminophen     Tablet .. 650 milliGRAM(s) Oral every 6 hours PRN Mild Pain (1 - 3), Moderate Pain (4 - 6)      I&O's Summary    26 Feb 2023 07:01  -  27 Feb 2023 07:00  --------------------------------------------------------  IN: 800 mL / OUT: 1600 mL / NET: -800 mL    27 Feb 2023 07:01  -  28 Feb 2023 05:22  --------------------------------------------------------  IN: 200 mL / OUT: 600 mL / NET: -400 mL        Vital Signs Last 24 Hrs  T(C): 36.6 (27 Feb 2023 21:19), Max: 36.7 (27 Feb 2023 09:00)  T(F): 97.9 (27 Feb 2023 21:19), Max: 98 (27 Feb 2023 09:00)  HR: 66 (27 Feb 2023 21:19) (62 - 78)  BP: 141/62 (27 Feb 2023 21:19) (134/72 - 169/64)  BP(mean): 81 (27 Feb 2023 17:11) (81 - 90)  RR: 17 (27 Feb 2023 21:19) (17 - 19)  SpO2: 100% (27 Feb 2023 21:19) (99% - 100%)    Parameters below as of 27 Feb 2023 21:19  Patient On (Oxygen Delivery Method): room air        CAPILLARY BLOOD GLUCOSE      POCT Blood Glucose.: 146 mg/dL (27 Feb 2023 22:05)  POCT Blood Glucose.: 178 mg/dL (27 Feb 2023 16:53)  POCT Blood Glucose.: 208 mg/dL (27 Feb 2023 13:09)  POCT Blood Glucose.: 99 mg/dL (27 Feb 2023 07:40)      PHYSICAL EXAM:  GENERAL APPEARANCE: NAD, alert and awake  HEENT:  PERRL, EOMI. MMM  NECK: Neck supple, non-tender no lymphadenopathy, masses or thyromegaly  CARDIAC: Normal S1 and S2. Systolic murmur. RRR  LUNGS: Clear to auscultation B/L, no rales, rhonchi, or wheezing appreciated  ABDOMEN: Soft , NTND, bowel sounds normal. No guarding or rebound.   MUSCULOSKELETAL: ROM intact.  No joint erythema or tenderness.   EXTREMITIES: No pitting edema b/l LEs. Peripheral pulses intact.   NEUROLOGICAL: Non focal. Strength exam much improved  SKIN: Warm and dry , Well perfused  PSYCHIATRIC: AOx3      LABS:                        8.1    6.45  )-----------( 93       ( 27 Feb 2023 05:42 )             24.0     Auto Eosinophil # 0.32  / Auto Eosinophil % 5.0   / Auto Neutrophil # 4.80  / Auto Neutrophil % 74.3  / BANDS % x                            7.7    6.36  )-----------( 82       ( 26 Feb 2023 14:10 )             23.5     Auto Eosinophil # x     / Auto Eosinophil % x     / Auto Neutrophil # x     / Auto Neutrophil % x     / BANDS % x                            6.3    6.31  )-----------( 88       ( 26 Feb 2023 05:32 )             18.9     Auto Eosinophil # x     / Auto Eosinophil % x     / Auto Neutrophil # x     / Auto Neutrophil % x     / BANDS % x        02-27    134<L>  |  101  |  100<H>  ----------------------------<  99  4.2   |  20<L>  |  2.10<H>  02-26    131<L>  |  98  |  101<H>  ----------------------------<  224<H>  4.4   |  19<L>  |  2.15<H>  02-26    127<L>  |  97<L>  |  107<H>  ----------------------------<  158<H>  4.2   |  17<L>  |  2.29<H>    Ca    9.9      27 Feb 2023 05:42  Mg     1.80     02-27  Phos  4.3     02-27  TPro  6.7  /  Alb  3.7  /  TBili  0.4  /  DBili  x   /  AST  60<H>  /  ALT  110<H>  /  AlkPhos  1032<H>  02-27  TPro  6.3  /  Alb  3.7  /  TBili  0.5  /  DBili  x   /  AST  78<H>  /  ALT  122<H>  /  AlkPhos  1050<H>  02-26  TPro  6.4  /  Alb  3.4  /  TBili  0.3  /  DBili  x   /  AST  73<H>  /  ALT  120<H>  /  AlkPhos  912<H>  02-26    PT/INR - ( 27 Feb 2023 05:42 )   PT: 13.5 sec;   INR: 1.16 ratio         PTT - ( 27 Feb 2023 05:42 )  PTT:49.5 sec              RADIOLOGY & ADDITIONAL TESTS:    Imaging Personally Reviewed:    Consultant(s) Notes Reviewed:      Care Discussed with Consultants/Other Providers:   Sultan Hussein MD  PGY 1 Department of Internal Medicine        Patient is a 57y old  Female who presents with a chief complaint of bradycardia (27 Feb 2023 16:53)      SUBJECTIVE / OVERNIGHT EVENTS: Pt seen and examined. No acute overnight events. Denies fevers, chills, CP, SOB, Abdominal pain, N/V, Constipation, Diarrhea. No pain under left breast as before. Has been ambulating to bathroom with some assistance. Feels energy level is improving.         MEDICATIONS  (STANDING):  buMETAnide 1 milliGRAM(s) Oral two times a day  dextrose 5%. 200 milliLiter(s) (50 mL/Hr) IV Continuous <Continuous>  dextrose 50% Injectable 25 Gram(s) IV Push once  dextrose 50% Injectable 12.5 Gram(s) IV Push once  dextrose 50% Injectable 25 Gram(s) IV Push once  dextrose Oral Gel 15 Gram(s) Oral once  glucagon  Injectable 1 milliGRAM(s) IntraMuscular once  heparin   Injectable 5000 Unit(s) SubCutaneous every 8 hours  hydrALAZINE 50 milliGRAM(s) Oral every 8 hours  influenza   Vaccine 0.5 milliLiter(s) IntraMuscular once  insulin lispro (ADMELOG) corrective regimen sliding scale   SubCutaneous three times a day before meals  insulin lispro (ADMELOG) corrective regimen sliding scale   SubCutaneous at bedtime  levothyroxine 125 MICROGram(s) Oral daily  linagliptin 5 milliGRAM(s) Oral daily  pantoprazole    Tablet 40 milliGRAM(s) Oral before breakfast    MEDICATIONS  (PRN):  acetaminophen     Tablet .. 650 milliGRAM(s) Oral every 6 hours PRN Mild Pain (1 - 3), Moderate Pain (4 - 6)      I&O's Summary    26 Feb 2023 07:01  -  27 Feb 2023 07:00  --------------------------------------------------------  IN: 800 mL / OUT: 1600 mL / NET: -800 mL    27 Feb 2023 07:01  -  28 Feb 2023 05:22  --------------------------------------------------------  IN: 200 mL / OUT: 600 mL / NET: -400 mL        Vital Signs Last 24 Hrs  T(C): 36.6 (27 Feb 2023 21:19), Max: 36.7 (27 Feb 2023 09:00)  T(F): 97.9 (27 Feb 2023 21:19), Max: 98 (27 Feb 2023 09:00)  HR: 66 (27 Feb 2023 21:19) (62 - 78)  BP: 141/62 (27 Feb 2023 21:19) (134/72 - 169/64)  BP(mean): 81 (27 Feb 2023 17:11) (81 - 90)  RR: 17 (27 Feb 2023 21:19) (17 - 19)  SpO2: 100% (27 Feb 2023 21:19) (99% - 100%)    Parameters below as of 27 Feb 2023 21:19  Patient On (Oxygen Delivery Method): room air        CAPILLARY BLOOD GLUCOSE      POCT Blood Glucose.: 146 mg/dL (27 Feb 2023 22:05)  POCT Blood Glucose.: 178 mg/dL (27 Feb 2023 16:53)  POCT Blood Glucose.: 208 mg/dL (27 Feb 2023 13:09)  POCT Blood Glucose.: 99 mg/dL (27 Feb 2023 07:40)      PHYSICAL EXAM:  GENERAL APPEARANCE: NAD, alert and awake  HEENT:  PERRL, EOMI. MMM  NECK: Neck supple, non-tender no lymphadenopathy, masses or thyromegaly  CARDIAC: Normal S1 and S2. Systolic murmur. RRR. hepatojugular reflux?  LUNGS: Clear to auscultation B/L, no rales, rhonchi, or wheezing appreciated  ABDOMEN: Soft , NTND, bowel sounds normal. No guarding or rebound.   MUSCULOSKELETAL: ROM intact.  No joint erythema or tenderness.   EXTREMITIES: No pitting edema b/l LEs. Peripheral pulses intact.   NEUROLOGICAL: Non focal. Strength exam much improved  SKIN: Warm and dry , Well perfused  PSYCHIATRIC: AOx3      LABS:                        8.1    6.45  )-----------( 93       ( 27 Feb 2023 05:42 )             24.0     Auto Eosinophil # 0.32  / Auto Eosinophil % 5.0   / Auto Neutrophil # 4.80  / Auto Neutrophil % 74.3  / BANDS % x                            7.7    6.36  )-----------( 82       ( 26 Feb 2023 14:10 )             23.5     Auto Eosinophil # x     / Auto Eosinophil % x     / Auto Neutrophil # x     / Auto Neutrophil % x     / BANDS % x                            6.3    6.31  )-----------( 88       ( 26 Feb 2023 05:32 )             18.9     Auto Eosinophil # x     / Auto Eosinophil % x     / Auto Neutrophil # x     / Auto Neutrophil % x     / BANDS % x        02-27    134<L>  |  101  |  100<H>  ----------------------------<  99  4.2   |  20<L>  |  2.10<H>  02-26    131<L>  |  98  |  101<H>  ----------------------------<  224<H>  4.4   |  19<L>  |  2.15<H>  02-26    127<L>  |  97<L>  |  107<H>  ----------------------------<  158<H>  4.2   |  17<L>  |  2.29<H>    Ca    9.9      27 Feb 2023 05:42  Mg     1.80     02-27  Phos  4.3     02-27  TPro  6.7  /  Alb  3.7  /  TBili  0.4  /  DBili  x   /  AST  60<H>  /  ALT  110<H>  /  AlkPhos  1032<H>  02-27  TPro  6.3  /  Alb  3.7  /  TBili  0.5  /  DBili  x   /  AST  78<H>  /  ALT  122<H>  /  AlkPhos  1050<H>  02-26  TPro  6.4  /  Alb  3.4  /  TBili  0.3  /  DBili  x   /  AST  73<H>  /  ALT  120<H>  /  AlkPhos  912<H>  02-26    PT/INR - ( 27 Feb 2023 05:42 )   PT: 13.5 sec;   INR: 1.16 ratio         PTT - ( 27 Feb 2023 05:42 )  PTT:49.5 sec              RADIOLOGY & ADDITIONAL TESTS:    Imaging Personally Reviewed:    Consultant(s) Notes Reviewed:      Care Discussed with Consultants/Other Providers:   Sultan Hussein MD  PGY 1 Department of Internal Medicine        Patient is a 57y old  Female who presents with a chief complaint of bradycardia (27 Feb 2023 16:53)      SUBJECTIVE / OVERNIGHT EVENTS: Pt seen and examined. No acute overnight events. Denies fevers, chills, CP, SOB, Abdominal pain, N/V, Constipation, Diarrhea. No pain under left breast as before. Has been ambulating to bathroom with some assistance. Feels energy level is improving.         MEDICATIONS  (STANDING):  buMETAnide 1 milliGRAM(s) Oral two times a day  dextrose 5%. 200 milliLiter(s) (50 mL/Hr) IV Continuous <Continuous>  dextrose 50% Injectable 25 Gram(s) IV Push once  dextrose 50% Injectable 12.5 Gram(s) IV Push once  dextrose 50% Injectable 25 Gram(s) IV Push once  dextrose Oral Gel 15 Gram(s) Oral once  glucagon  Injectable 1 milliGRAM(s) IntraMuscular once  heparin   Injectable 5000 Unit(s) SubCutaneous every 8 hours  hydrALAZINE 50 milliGRAM(s) Oral every 8 hours  influenza   Vaccine 0.5 milliLiter(s) IntraMuscular once  insulin lispro (ADMELOG) corrective regimen sliding scale   SubCutaneous three times a day before meals  insulin lispro (ADMELOG) corrective regimen sliding scale   SubCutaneous at bedtime  levothyroxine 125 MICROGram(s) Oral daily  linagliptin 5 milliGRAM(s) Oral daily  pantoprazole    Tablet 40 milliGRAM(s) Oral before breakfast    MEDICATIONS  (PRN):  acetaminophen     Tablet .. 650 milliGRAM(s) Oral every 6 hours PRN Mild Pain (1 - 3), Moderate Pain (4 - 6)      I&O's Summary    26 Feb 2023 07:01  -  27 Feb 2023 07:00  --------------------------------------------------------  IN: 800 mL / OUT: 1600 mL / NET: -800 mL    27 Feb 2023 07:01  -  28 Feb 2023 05:22  --------------------------------------------------------  IN: 200 mL / OUT: 600 mL / NET: -400 mL        Vital Signs Last 24 Hrs  T(C): 36.6 (27 Feb 2023 21:19), Max: 36.7 (27 Feb 2023 09:00)  T(F): 97.9 (27 Feb 2023 21:19), Max: 98 (27 Feb 2023 09:00)  HR: 66 (27 Feb 2023 21:19) (62 - 78)  BP: 141/62 (27 Feb 2023 21:19) (134/72 - 169/64)  BP(mean): 81 (27 Feb 2023 17:11) (81 - 90)  RR: 17 (27 Feb 2023 21:19) (17 - 19)  SpO2: 100% (27 Feb 2023 21:19) (99% - 100%)    Parameters below as of 27 Feb 2023 21:19  Patient On (Oxygen Delivery Method): room air        CAPILLARY BLOOD GLUCOSE      POCT Blood Glucose.: 146 mg/dL (27 Feb 2023 22:05)  POCT Blood Glucose.: 178 mg/dL (27 Feb 2023 16:53)  POCT Blood Glucose.: 208 mg/dL (27 Feb 2023 13:09)  POCT Blood Glucose.: 99 mg/dL (27 Feb 2023 07:40)      PHYSICAL EXAM:  GENERAL APPEARANCE: NAD, alert and awake  HEENT:  PERRL, EOMI. MMM  NECK: Neck supple, non-tender no lymphadenopathy, masses or thyromegaly  CARDIAC: Normal S1 and S2. Systolic murmur. RRR.   LUNGS: Clear to auscultation B/L, no rales, rhonchi, or wheezing appreciated  ABDOMEN: Soft , NTND, bowel sounds normal. No guarding or rebound.   MUSCULOSKELETAL: ROM intact.  No joint erythema or tenderness.   EXTREMITIES: No pitting edema b/l LEs. Peripheral pulses intact.   NEUROLOGICAL: Non focal. Strength exam much improved  SKIN: Warm and dry , Well perfused  PSYCHIATRIC: AOx3      LABS:                        8.1    6.45  )-----------( 93       ( 27 Feb 2023 05:42 )             24.0     Auto Eosinophil # 0.32  / Auto Eosinophil % 5.0   / Auto Neutrophil # 4.80  / Auto Neutrophil % 74.3  / BANDS % x                            7.7    6.36  )-----------( 82       ( 26 Feb 2023 14:10 )             23.5     Auto Eosinophil # x     / Auto Eosinophil % x     / Auto Neutrophil # x     / Auto Neutrophil % x     / BANDS % x                            6.3    6.31  )-----------( 88       ( 26 Feb 2023 05:32 )             18.9     Auto Eosinophil # x     / Auto Eosinophil % x     / Auto Neutrophil # x     / Auto Neutrophil % x     / BANDS % x        02-27    134<L>  |  101  |  100<H>  ----------------------------<  99  4.2   |  20<L>  |  2.10<H>  02-26    131<L>  |  98  |  101<H>  ----------------------------<  224<H>  4.4   |  19<L>  |  2.15<H>  02-26    127<L>  |  97<L>  |  107<H>  ----------------------------<  158<H>  4.2   |  17<L>  |  2.29<H>    Ca    9.9      27 Feb 2023 05:42  Mg     1.80     02-27  Phos  4.3     02-27  TPro  6.7  /  Alb  3.7  /  TBili  0.4  /  DBili  x   /  AST  60<H>  /  ALT  110<H>  /  AlkPhos  1032<H>  02-27  TPro  6.3  /  Alb  3.7  /  TBili  0.5  /  DBili  x   /  AST  78<H>  /  ALT  122<H>  /  AlkPhos  1050<H>  02-26  TPro  6.4  /  Alb  3.4  /  TBili  0.3  /  DBili  x   /  AST  73<H>  /  ALT  120<H>  /  AlkPhos  912<H>  02-26    PT/INR - ( 27 Feb 2023 05:42 )   PT: 13.5 sec;   INR: 1.16 ratio         PTT - ( 27 Feb 2023 05:42 )  PTT:49.5 sec              RADIOLOGY & ADDITIONAL TESTS:    Imaging Personally Reviewed:    Consultant(s) Notes Reviewed:      Care Discussed with Consultants/Other Providers:

## 2023-02-28 NOTE — PROGRESS NOTE ADULT - PROBLEM SELECTOR PLAN 4
ALP elevation to > 1000. AST and /167  Appears to have chronic ALP elevation and occasional AST/ALT elevation in past but acute rise noted  perhaps in setting of congestive hepatopathy and bradycardia  -trend LFTs -> improving  -hepatitis panel neg  -RUQ US -> steatosis, gallstones

## 2023-02-28 NOTE — PROGRESS NOTE ADULT - SUBJECTIVE AND OBJECTIVE BOX
Chief Complaint: Hypothyroidism, DM2    History: continues to use warming blanket. Is not sure what her usual temperature is at home.  tolerating po  no hypoglycemia events    MEDICATIONS  (STANDING):  buMETAnide 1 milliGRAM(s) Oral two times a day  dextrose 50% Injectable 25 Gram(s) IV Push once  dextrose 50% Injectable 12.5 Gram(s) IV Push once  dextrose 50% Injectable 25 Gram(s) IV Push once  dextrose Oral Gel 15 Gram(s) Oral once  glucagon  Injectable 1 milliGRAM(s) IntraMuscular once  heparin   Injectable 5000 Unit(s) SubCutaneous every 8 hours  hydrALAZINE 50 milliGRAM(s) Oral every 8 hours  influenza   Vaccine 0.5 milliLiter(s) IntraMuscular once  insulin lispro (ADMELOG) corrective regimen sliding scale   SubCutaneous three times a day before meals  insulin lispro (ADMELOG) corrective regimen sliding scale   SubCutaneous at bedtime  levothyroxine 125 MICROGram(s) Oral daily  linagliptin 5 milliGRAM(s) Oral daily  pantoprazole    Tablet 40 milliGRAM(s) Oral before breakfast    MEDICATIONS  (PRN):  acetaminophen     Tablet .. 650 milliGRAM(s) Oral every 6 hours PRN Mild Pain (1 - 3), Moderate Pain (4 - 6)      Allergies    No Known Allergies    Intolerances      Review of Systems:    ALL OTHER SYSTEMS REVIEWED AND NEGATIVE      PHYSICAL EXAM:  VITALS: T(C): 36.3 (02-28-23 @ 12:00)  T(F): 97.3 (02-28-23 @ 12:00), Max: 98.6 (02-28-23 @ 05:00)  HR: 60 (02-28-23 @ 12:00) (60 - 69)  BP: 161/72 (02-28-23 @ 12:00) (141/62 - 169/64)  RR:  (17 - 19)  SpO2:  (99% - 100%)  Wt(kg): --  GENERAL: NAD, well-groomed, well-developed, warming blanket in place  EYES: No proptosis, no lid lag, anicteric  HEENT:  Atraumatic, Normocephalic, moist mucous membranes  RESPIRATORY: nonlabored respirations, no wheezing  PSYCH: Alert and oriented x 3, normal affect, normal mood    CAPILLARY BLOOD GLUCOSE      POCT Blood Glucose.: 214 mg/dL (28 Feb 2023 11:29)  POCT Blood Glucose.: 102 mg/dL (28 Feb 2023 07:40)  POCT Blood Glucose.: 146 mg/dL (27 Feb 2023 22:05)  POCT Blood Glucose.: 178 mg/dL (27 Feb 2023 16:53)      02-28    134<L>  |  100  |  86<H>  ----------------------------<  85  4.0   |  20<L>  |  1.88<H>    eGFR: 31<L>    Ca    9.6      02-28  Mg     1.70     02-28  Phos  3.7     02-28    TPro  6.9  /  Alb  3.6  /  TBili  0.4  /  DBili  x   /  AST  40<H>  /  ALT  87<H>  /  AlkPhos  966<H>  02-28      A1C with Estimated Average Glucose Result: 6.1 % (02-26-23 @ 05:32)  A1C with Estimated Average Glucose Result: 6.9 % (01-17-23 @ 07:10)  A1C with Estimated Average Glucose Result: 9.7 % (11-25-22 @ 14:20)  A1C with Estimated Average Glucose Result: 9.1 % (09-29-22 @ 06:00)  A1C with Estimated Average Glucose Result: 9.2 % (09-28-22 @ 07:47)      Thyroid Function Tests:  02-27 @ 05:42 TSH -- FreeT4 2.5 T3 66 Anti TPO -- Anti Thyroglobulin Ab -- TSI --  02-25 @ 14:13 TSH -- FreeT4 2.4 T3 -- Anti TPO -- Anti Thyroglobulin Ab -- TSI --

## 2023-02-28 NOTE — DISCHARGE NOTE PROVIDER - NSDCFUADDAPPT_GEN_ALL_CORE_FT
Please follow-up with your PCP within two weeks.    Please follow-up with your cardiologist within two weeks.     Please follow-up with endocrinology at your scheduled appointment on 3/2.     Please follow-up with pulmonology at your scheduled appointment on 3/10.    Please follow-up with your nephrologist at your scheduled appointment on 3/24.

## 2023-02-28 NOTE — PROGRESS NOTE ADULT - PROBLEM SELECTOR PLAN 10
on labetalol and hydral at home  pressures ok  -restart hydral on labetalol and hydral at home  pressures ok  -restart hydral  -hold labetalol at d/c

## 2023-02-28 NOTE — PROGRESS NOTE ADULT - PROBLEM SELECTOR PLAN 1
has been chronically ravinder at home since last admission, perhaps precipitated by poorly controlled hypothyroidism (though as per daughter has been taking medications) and use of labetalol at home  now in 50s  Ekg sinus ravinder w/ first degree AV block  responded to tx of hypothermia  -manage hypothyroidism  -c/w bearhugger  -hold BB  -if ravinder < 40 address reversible causes and then can consider atropine -> consider EP/MICU eval; MICU suggesting dopamine infusion if ravinder  if unstable, ACLS  -tele monitoring    #Hypothermia  ~90 on admission now normothermic s/p blanket  Unclear why patient so hypothermic; manifestation of myxedema coma?  possible infection but no leukocytosis or infectious symptoms. few bacteria on U/A but no other findings on U/A suggestive of infection or patient/family noting urinary symptoms  hypoglycemica noted O/N -> possible contribution (as per patient though sugars are ok at home)  -bear hugger  -endo recs  -f/u BCx drawn in ED  -monitor off Abx has been chronically ravinder at home since last admission, perhaps precipitated by poorly controlled hypothyroidism (though as per daughter has been taking medications) and use of labetalol at home  now in 50s  Ekg sinus ravinder w/ first degree AV block  responded to tx of hypothermia  -manage hypothyroidism  -c/w bearhugger  -hold BB  -if ravinder < 40 address reversible causes and then can consider atropine -> consider EP/MICU eval; MICU suggesting dopamine infusion if ravinder  if unstable, ACLS  -tele monitoring    #Hypothermia  ~90 on admission now normothermic s/p blanket  Unclear why patient so hypothermic; manifestation of myxedema coma?  possible infection but no leukocytosis or infectious symptoms. few bacteria on U/A but no other findings on U/A suggestive of infection or patient/family noting urinary symptoms  hypoglycemica noted O/N -> possible contribution (as per patient though sugars are ok at home)  -bear hugger  -endo recs -> do not think it is an endo issue; could be related to hypothalamic dysfunction  -f/u BCx drawn in ED  -monitor off Abx has been chronically ravinder at home since last admission, perhaps precipitated by poorly controlled hypothyroidism (though as per daughter has been taking medications) and use of labetalol at home  now in 50s  Ekg sinus ravinder w/ first degree AV block  responded to tx of hypothermia  -manage hypothyroidism  -c/w bearhugger  -hold BB  -if ravinder < 40 address reversible causes and then can consider atropine -> consider EP/MICU eval; MICU suggesting dopamine infusion if ravinder  if unstable, ACLS  -tele monitoring    #Hypothermia  ~90 on admission now normothermic s/p blanket  Unclear why patient so hypothermic; not related to endo cause as per endo  possible infection but no leukocytosis or infectious symptoms. few bacteria on U/A but no other findings on U/A suggestive of infection or patient/family noting urinary symptoms  hypoglycemica noted -> possible contribution though sugars now ok  -bear hugger  -endo recs -> do not think it is an endo issue; could be related to hypothalamic dysfunction -> outpt workup w/ MRI?  -f/u BCx drawn in ED  -monitor off Abx has been chronically ravinder at home since last admission, perhaps precipitated by poorly controlled hypothyroidism (though as per daughter has been taking medications) and use of labetalol at home  now in 50s  Ekg sinus ravinder w/ first degree AV block  responded to tx of hypothermia, hyperkalemia and discontinuation BB  -manage hypothyroidism  -c/w bearhugger  -hold BB  -if ravinder < 40 address reversible causes and then can consider atropine -> consider EP/MICU eval; MICU suggesting dopamine infusion if ravinder  if unstable, ACLS  -tele monitoring    #Hypothermia  ~90 on admission now normothermic s/p blanket  Unclear why patient so hypothermic; not related to endo cause as per endo  possible infection but no leukocytosis or infectious symptoms. few bacteria on U/A but no other findings on U/A suggestive of infection or patient/family noting urinary symptoms  hypoglycemica noted -> possible contribution though sugars now ok  -bear hugger  -endo recs -> do not think it is an endo issue; could be related to hypothalamic dysfunction -> outpt workup w/ MRI?  -f/u BCx drawn in ED  -monitor off Abx

## 2023-02-28 NOTE — DISCHARGE NOTE PROVIDER - HOSPITAL COURSE
HPI: 56 yo F w/ hypothyroidism, HTN, T2DM, HLD, CKD, PAH, asthma on albuterol presented to the ED w/ c/o weakness and R sided CP. Pt with recent admission for myxedema coma requiring intubation and vasopressors. Pt on arrival to ED bradycardic to 20's. Pt complaining of sharp L sided CP for 2-3 days, confirmed with daughter as patient is a poor historian. As per daughter, patient has been having pleuritic left-sided lower chest pain. Pt has apparently been drinking more than she should and daughter is worried about fluid overload. Noted pt was complaining of excessive sweating this morning. The daughter and son administer all medications but the patient is otherwise fairly independent in ADLs. Notably, patient has apparently had HR in 40s consistently since last hospitalization (and has been receiving labetalol at home). No fever, cough, sore throat, dyspnea.     In ED, patient received one dose of atropine with no effect and then placed on epi drip. After bairhugger placed for temp of 90.5, temp came up and patient's HR also responded, now in 50s. Ekg w/ sinus bradycardia and first degree AV block. No ischemic changes or hyperkalemic changes noted. Na 121 and K of 5.9 on presentation. Received 500 cc bolus NS. Given albuterol, insulin 5 + D50, lokelma. Repeat K 5.5. Also given stress dose levothyroxine 300 IV due to concern for hypothyroid crisis    Hospital course: CXR with mild pulmonary congestion and POCUS with some B-lines. Given dose of IV Bumex and sodium closely monitored to avoid overcorrection (required short run of D5 in first 24 hours) with sodium gradually returning to baseline. ISAMAR on CKD in setting of possible fluid overload. Second hyperkalemia cocktail provided due to persistent hyperkalemia but with diuresis and renal function improving, patient's potassium returned to normal. Nephrology initially consulted for hyperkalemia and possible need for HD but patient's renal function improved with fluid restriction and continuing home Bumex. Endocrinology consulted due to concern for myxedema coma; given TFTs, which were consistent with mild hypothyroidism suspicion for hypothyroidism as the cause for clinical picture low. Initially treated with IV levothyroxine due to concern for gut malabsorption but switched back to home PO dose; endo recommending TFTs on 3/6. Bradycardia corrected with discontinuation of home beta-blocker, correction of hypothermia, and resolution of hyperkalemia. Transaminitis (ALP-predominant) noted, likely in setting of bradycardia and possible congestive hepatopathy. Hepatitis panel negative and RUQ only noting cholelithiasis (asymptomatic) and steatosis. Transaminitis continued to improve though ALP remained elevated over baseline; can be followed up with outpatient labs. Hypoglycemia noted during first night; Lantus discontinued. Endocrinology recommending discontinuation of home Tresiba and starting Januvia 25 mg QD. TTE done given profound bradycardia, demonstrating preserved EF, severe PH, and mod-severe TR; patient with no dyspnea and appearing euvolemic so should have close follow-up with cardiology and pulmonology to further evaluate these findings. Left-sided pleuritic chest pain exacerbated with movement eventually resolved; trops peaked and Ekg with no ischemic change; most likely MSK in nature. Etiology of patient's hypothermia still unclear; it is possible that it is related to hypothalamic dysfunction and may warrant neurological workup at the discretion of PCP. At time of discharge, patient medically optimized and hemodynamically stable for discharge to home.     Medication changes:  Stopped home Tresiba  Started Januvia  Stopped labetalol HPI: 56 yo F w/ hypothyroidism, HTN, T2DM, HLD, CKD, PAH, asthma on albuterol presented to the ED w/ c/o weakness and R sided CP. Pt with recent admission for myxedema coma requiring intubation and vasopressors. Pt on arrival to ED bradycardic to 20's. Pt complaining of sharp L sided CP for 2-3 days, confirmed with daughter as patient is a poor historian. As per daughter, patient has been having pleuritic left-sided lower chest pain. Pt has apparently been drinking more than she should and daughter is worried about fluid overload. Noted pt was complaining of excessive sweating this morning. The daughter and son administer all medications but the patient is otherwise fairly independent in ADLs. Notably, patient has apparently had HR in 40s consistently since last hospitalization (and has been receiving labetalol at home). No fever, cough, sore throat, dyspnea.     In ED, patient received one dose of atropine with no effect and then placed on epi drip. After bairhugger placed for temp of 90.5, temp came up and patient's HR also responded, now in 50s. Ekg w/ sinus bradycardia and first degree AV block. No ischemic changes or hyperkalemic changes noted. Na 121 and K of 5.9 on presentation. Received 500 cc bolus NS. Given albuterol, insulin 5 + D50, lokelma. Repeat K 5.5. Also given stress dose levothyroxine 300 IV due to concern for hypothyroid crisis    Hospital course: CXR with mild pulmonary congestion and POCUS with some B-lines. Given dose of IV Bumex and sodium closely monitored to avoid overcorrection (required short run of D5 in first 24 hours) with sodium gradually returning to baseline. ISAMAR on CKD in setting of possible fluid overload. Second hyperkalemia cocktail provided due to persistent hyperkalemia but with diuresis and renal function improving, patient's potassium returned to normal. Nephrology initially consulted for hyperkalemia and possible need for HD but patient's renal function improved with fluid restriction and continuing home Bumex. Endocrinology consulted due to concern for myxedema coma; given TFTs, which were consistent with mild hypothyroidism suspicion for hypothyroidism as the cause for clinical picture low. Low suspicion for hypoadrenalism as well due to cortisol not being low. Initially treated with IV levothyroxine due to concern for gut malabsorption but switched back to home PO dose; endo recommending TFTs on 3/6. Bradycardia corrected with discontinuation of home beta-blocker, correction of hypothermia, and resolution of hyperkalemia. Transaminitis (ALP-predominant) noted, likely in setting of bradycardia and possible congestive hepatopathy. Hepatitis panel negative and RUQ only noting cholelithiasis (asymptomatic) and steatosis. Transaminitis continued to improve though ALP remained elevated over baseline; can be followed up with outpatient labs. Hypoglycemia noted during first night; Lantus discontinued. Endocrinology recommending discontinuation of home Tresiba and starting Januvia 25 mg QD. TTE done given profound bradycardia, demonstrating preserved EF, severe PH, and mod-severe TR; patient with no dyspnea and appearing euvolemic so should have close follow-up with cardiology and pulmonology to further evaluate these findings. Left-sided pleuritic chest pain exacerbated with movement eventually resolved; trops peaked and Ekg with no ischemic change; most likely MSK in nature. Etiology of patient's hypothermia still unclear; it is possible that it is related to hypothalamic dysfunction and may warrant neurological workup at the discretion of PCP. At time of discharge, patient medically optimized and hemodynamically stable for discharge to home.     Medication changes:  Stopped home Tresiba  Started Januvia  Stopped labetalol HPI: 56 yo F w/ hypothyroidism, HTN, T2DM, HLD, CKD, PAH, asthma on albuterol presented to the ED w/ c/o weakness and R sided CP. Pt with recent admission for myxedema coma requiring intubation and vasopressors. Pt on arrival to ED bradycardic to 20's. Pt complaining of sharp L sided CP for 2-3 days, confirmed with daughter as patient is a poor historian. As per daughter, patient has been having pleuritic left-sided lower chest pain. Pt has apparently been drinking more than she should and daughter is worried about fluid overload. Noted pt was complaining of excessive sweating this morning. The daughter and son administer all medications but the patient is otherwise fairly independent in ADLs. Notably, patient has apparently had HR in 40s consistently since last hospitalization (and has been receiving labetalol at home). No fever, cough, sore throat, dyspnea.     In ED, patient received one dose of atropine with no effect and then placed on epi drip. After bairhugger placed for temp of 90.5, temp came up and patient's HR also responded, now in 50s. Ekg w/ sinus bradycardia and first degree AV block. No ischemic changes or hyperkalemic changes noted. Na 121 and K of 5.9 on presentation. Received 500 cc bolus NS. Given albuterol, insulin 5 + D50, lokelma. Repeat K 5.5. Also given stress dose levothyroxine 300 IV due to concern for hypothyroid crisis    Hospital course: CXR with mild pulmonary congestion and POCUS with some B-lines. Given dose of IV Bumex and sodium closely monitored to avoid overcorrection (required short run of D5 in first 24 hours) with sodium gradually returning to baseline. ISAMAR on CKD in setting of possible fluid overload. Second hyperkalemia cocktail provided due to persistent hyperkalemia but with diuresis and renal function improving, patient's potassium returned to normal. Nephrology initially consulted for hyperkalemia and possible need for HD but patient's renal function improved with fluid restriction and continuing home Bumex. Endocrinology consulted due to concern for myxedema coma; given TFTs, which were consistent with mild hypothyroidism suspicion for hypothyroidism as the cause for clinical picture low. Low suspicion for hypoadrenalism as well due to cortisol not being low. Initially treated with IV levothyroxine due to concern for gut malabsorption but switched back to home PO dose; endo recommending TFTs w/ PCP in 4-6 weeks. Bradycardia corrected with discontinuation of home beta-blocker, correction of hypothermia, and resolution of hyperkalemia. Transaminitis (ALP-predominant) noted, likely in setting of bradycardia and possible congestive hepatopathy. Hepatitis panel negative and RUQ only noting cholelithiasis (asymptomatic) and steatosis. Transaminitis continued to improve though ALP remained elevated over baseline; can be followed up with outpatient labs. Hypoglycemia noted during first night; Lantus discontinued. Endocrinology recommending discontinuation of home Tresiba, starting Januvia 25 mg QD, and decreasing prandin dose to 0.5 mg tid. TTE done given profound bradycardia, demonstrating preserved EF, severe PH, and mod-severe TR; patient with no dyspnea and appearing euvolemic so should have close follow-up with cardiology and pulmonology to further evaluate these findings. Left-sided pleuritic chest pain exacerbated with movement eventually resolved; trops peaked and Ekg with no ischemic change; most likely MSK in nature. Etiology of patient's hypothermia still unclear; it is possible that it is related to hypothalamic dysfunction and may warrant neurological workup at the discretion of PCP. At time of discharge, patient medically optimized and hemodynamically stable for discharge to home.     Medication changes:  Stopped home Tresiba  Started Januvia  Stopped labetalol   Decreased prandin dose to 0.5 mg tid

## 2023-02-28 NOTE — DISCHARGE NOTE PROVIDER - NSDCFUSCHEDAPPT_GEN_ALL_CORE_FT
[facility]  ENDOCRIN [address] [facility]  Scheduled Appointment: 03/02/2023    [facility]  PULMMED [address]  Scheduled Appointment: 03/10/2023    Courtney Dao  [facility]  PULED [address]  Scheduled Appointment: 03/10/2023    Jonel Coyle  [facility]  NEPHRO [address]   Scheduled Appointment: 03/24/2023

## 2023-02-28 NOTE — DISCHARGE NOTE PROVIDER - NSDCMRMEDTOKEN_GEN_ALL_CORE_FT
alcohol swabs : Apply topically to affected area once a day   atorvastatin 40 mg oral tablet: 1 tab(s) orally once a day (at bedtime)  bumetanide 1 mg oral tablet: 1 tab(s) orally 2 times a day  hydrALAZINE 50 mg oral tablet: 1 tab(s) orally every 8 hours  Januvia 25 mg oral tablet: 1 tab(s) orally once a day     ***test script  labetalol 100 mg oral tablet: 0.5 tab(s) orally 2 times a day  Levemir FlexPen 100 units/mL subcutaneous solution: 3 unit(s) subcutaneous once a day (at bedtime)   levothyroxine 125 mcg (0.125 mg) oral tablet: 1 tab(s) orally once a day  linagliptin 5 mg oral tablet: 1 tab(s) orally once a day       ****test script  omeprazole 20 mg oral delayed release tablet: 1 tab(s) orally once a day  Prandin 1 mg oral tablet: 1 tab(s) orally 3 times a day (before meals)  test strips (per patient&#x27;s insurance): 1 application subcutaneously 4 times a day. ** Compatible with patient&#x27;s glucometer **   atorvastatin 40 mg oral tablet: 1 tab(s) orally once a day (at bedtime)  bumetanide 1 mg oral tablet: 1 tab(s) orally 2 times a day  hydrALAZINE 50 mg oral tablet: 1 tab(s) orally every 8 hours  levothyroxine 125 mcg (0.125 mg) oral tablet: 1 tab(s) orally once a day  omeprazole 20 mg oral delayed release tablet: 1 tab(s) orally once a day   alcohol swabs : Apply topically to affected area 4 times a day   atorvastatin 40 mg oral tablet: 1 tab(s) orally once a day (at bedtime)  bumetanide 1 mg oral tablet: 1 tab(s) orally 2 times a day  glucometer (per patient&#x27;s insurance): Test blood sugars four times a day. Dispense #1 glucometer.  hydrALAZINE 50 mg oral tablet: 1 tab(s) orally every 8 hours  Insulin Pen Needles, 4mm: 1 application subcutaneously 4 times a day. ** Use with insulin pen **   Januvia 25 mg oral tablet: 1 tab(s) orally once a day   lancets: 1 application subcutaneously 4 times a day   levothyroxine 125 mcg (0.125 mg) oral tablet: 1 tab(s) orally once a day  omeprazole 20 mg oral delayed release tablet: 1 tab(s) orally once a day  Prandin 1 mg oral tablet: 0.5 tab(s) orally 3 times a day (before meals)     Please do not take if you skip a meal  test strips (per patient&#x27;s insurance): 1 application subcutaneously 4 times a day. ** Compatible with patient&#x27;s glucometer **   alcohol swabs : Apply topically to affected area 4 times a day   atorvastatin 40 mg oral tablet: 1 tab(s) orally once a day (at bedtime)  bumetanide 1 mg oral tablet: 1 tab(s) orally 2 times a day  glucometer (per patient&#x27;s insurance): Test blood sugars four times a day. Dispense #1 glucometer.  hydrALAZINE 50 mg oral tablet: 1 tab(s) orally every 8 hours  Insulin Pen Needles, 4mm: 1 application subcutaneously 4 times a day. ** Use with insulin pen **   Januvia 25 mg oral tablet: 1 tab(s) orally once a day   lancets: 1 application subcutaneously 4 times a day   levothyroxine 125 mcg (0.125 mg) oral tablet: 1 tab(s) orally once a day  omeprazole 20 mg oral delayed release tablet: 1 tab(s) orally once a day  outpatient physical therapy: outpatient PT  Prandin 1 mg oral tablet: 0.5 tab(s) orally 3 times a day (before meals)     Please do not take if you skip a meal  test strips (per patient&#x27;s insurance): 1 application subcutaneously 4 times a day. ** Compatible with patient&#x27;s glucometer **

## 2023-02-28 NOTE — PROGRESS NOTE ADULT - ASSESSMENT
57 year old female with history of hypothyroidism, HTN, Dm Type 2, CKD, PAH, asthma on albuterol who presented to the ED with weakness and right sided chest pain found to be bradycardia and hypothermic. TSH was 11.5 in the setting of previous history of myxedema and then was consulted based on that. Patient reported adherence with her levothyroxine outpatient. Complex patient high level decision making.    1) Primary Hypothyroidism   -TSH 11.5 (mild elevation), not explaining the current situation of bradycardia and hypothermia  -Furthermore patient given loading dose of levothyroxine 300 mcg IV in ED then 94 mcg IV daily. Free T4 then was elevated at 2.5 on 2/27 (confirmed x 2).  In the setting of elevated free T4 the thyroid cannot be the explanation for the ongoing hypothermia and other medical issues.  -Adrenal insufficiency ruled out given elevated cortisol   -Changed back to home dose levothyroxine 125 mcg po daily. Patient reports adherence to this prior to admission.  -Can repeat TFTs in 1 week if remains inpatient on 3/6/23    2) Hypoglycemia   -Likely 2/2 overtreatment of her initial hyperglycemia with regular insulin   GFR 31    3) Type 2 DM with hypoglycemia  Home regimen: Tresiba 3 units qhs and prandin 1mg premeal TID. Patient reports episode of hypoglycemia at home prior to admission with AMS.  Patient also reports using glyburide in the past and had passed out. She wants to make sure her discharge meds will not cause hypoglycemia.  -HbA1C of 6.1% outpatient   -While inpatient fasting glucose controlled and some prandial glucose elevations.  -Added linagliptin 5mg po daily, first dose to start today  -continue low correction scale premeal and low bedtime scale  DC plan: will plan to stop basal insulin (Tresiba) and change to DPP4i + prandin dose TBD on dc. (Or if glucose control on linagliptin is adequate inpatient may consider using only dpp4i on dc).  Januvia is covered for dc (would be Januvia 25mg daily).    Citlalli Mejia MD  Division of Endocrinology  Pager: [phone]    If after 6PM or before 9AM, or on weekends/holidays, please call endocrine answering service for assistance ([phone]).  For nonurgent matters email [email or web address] for assistance.

## 2023-02-28 NOTE — DISCHARGE NOTE PROVIDER - NSDCCPTREATMENT_GEN_ALL_CORE_FT
PRINCIPAL PROCEDURE  Procedure: US RUQ abdomen  Findings and Treatment:   < end of copied text >  1. Mildly increased echogenicity of the liver suggestive of hepatic   steatosis. No biliary ductal dilatation.  2. Cholelithiasis without evidence of cholecystitis.  3. Partially imaged right pleural effusion.  4. Mild ascites.< from: US Abdomen Upper Quadrant Right (02.26.23 @ 11:51) >        SECONDARY PROCEDURE  Procedure: Transthoracic echocardiography (TTE)  Findings and Treatment:   < end of copied text >  OBSERVATIONS:  Mitral Valve: Normal mitral valve. Minimal mitral  regurgitation.  Aortic Root: Normal aortic root.  Aortic Valve: Normal trileaflet aortic valve.  Left Atrium: Mildly dilated left atrium.  LA volume index =  38 cc/m2.  Left Ventricle: Normal left ventricular systolic function.  No segmental wall motion abnormalities. Normal left  ventricular internal dimensions and wall thicknesses.  Moderate diastolic dysfunction (Stage II).  Right Heart: Normal right atrium. Right ventricular  enlargement with normal right ventricular systolic  function. Normal tricuspid valve. Moderate-severe tricuspid  regurgitation. Normal pulmonic valve. Minimal pulmonic  regurgitation.  Pericardium/PleuraNormal pericardium with trace pericardial  effusion. Right pleural effusion.  Hemodynamic: Estimated right ventricular systolic pressure  equals 63 mm Hg, assuming right atrial pressure equals 10  mm Hg, consistent with severe pulmonary hypertension.  ------------------------------------------------------------------------  CONCLUSIONS:  1. Normal left ventricular internal dimensions and wall  thicknesses.  2. Normal left ventricular systolic function. No segmental  wall motion abnormalities.  3. Moderate diastolic dysfunction (Stage II).  4. Right ventricular enlargement with normal right  ventricular systolic function.  5. Normal tricuspid valve. Moderate-severe tricuspid  regurgitation.  6. Estimated pulmonary artery systolic pressure equals 63  mm Hg, assuming right atrial pressure equals 10  mm Hg,  consistent with severe pulmonary hypertension.  7. Normal pericardium with trace pericardial effusion.  8. Right pleural effusion.< from: Transthoracic Echocardiogram (02.27.23 @ 11:28) >

## 2023-03-01 ENCOUNTER — TRANSCRIPTION ENCOUNTER (OUTPATIENT)
Age: 58
End: 2023-03-01

## 2023-03-01 VITALS
HEART RATE: 66 BPM | SYSTOLIC BLOOD PRESSURE: 137 MMHG | DIASTOLIC BLOOD PRESSURE: 60 MMHG | RESPIRATION RATE: 18 BRPM | OXYGEN SATURATION: 100 % | TEMPERATURE: 98 F

## 2023-03-01 LAB
ALBUMIN SERPL ELPH-MCNC: 3.6 G/DL — SIGNIFICANT CHANGE UP (ref 3.3–5)
ALP SERPL-CCNC: 819 U/L — HIGH (ref 40–120)
ALT FLD-CCNC: 65 U/L — HIGH (ref 4–33)
ANION GAP SERPL CALC-SCNC: 13 MMOL/L — SIGNIFICANT CHANGE UP (ref 7–14)
AST SERPL-CCNC: 26 U/L — SIGNIFICANT CHANGE UP (ref 4–32)
BASOPHILS # BLD AUTO: 0.01 K/UL — SIGNIFICANT CHANGE UP (ref 0–0.2)
BASOPHILS NFR BLD AUTO: 0.1 % — SIGNIFICANT CHANGE UP (ref 0–2)
BILIRUB SERPL-MCNC: 0.4 MG/DL — SIGNIFICANT CHANGE UP (ref 0.2–1.2)
BUN SERPL-MCNC: 81 MG/DL — HIGH (ref 7–23)
CALCIUM SERPL-MCNC: 9.4 MG/DL — SIGNIFICANT CHANGE UP (ref 8.4–10.5)
CHLORIDE SERPL-SCNC: 102 MMOL/L — SIGNIFICANT CHANGE UP (ref 98–107)
CO2 SERPL-SCNC: 22 MMOL/L — SIGNIFICANT CHANGE UP (ref 22–31)
CREAT SERPL-MCNC: 1.94 MG/DL — HIGH (ref 0.5–1.3)
EGFR: 30 ML/MIN/1.73M2 — LOW
EOSINOPHIL # BLD AUTO: 0.32 K/UL — SIGNIFICANT CHANGE UP (ref 0–0.5)
EOSINOPHIL NFR BLD AUTO: 4.6 % — SIGNIFICANT CHANGE UP (ref 0–6)
GLUCOSE SERPL-MCNC: 156 MG/DL — HIGH (ref 70–99)
HCT VFR BLD CALC: 24.9 % — LOW (ref 34.5–45)
HGB BLD-MCNC: 8.1 G/DL — LOW (ref 11.5–15.5)
IANC: 5.13 K/UL — SIGNIFICANT CHANGE UP (ref 1.8–7.4)
IMM GRANULOCYTES NFR BLD AUTO: 0.6 % — SIGNIFICANT CHANGE UP (ref 0–0.9)
LYMPHOCYTES # BLD AUTO: 1.05 K/UL — SIGNIFICANT CHANGE UP (ref 1–3.3)
LYMPHOCYTES # BLD AUTO: 14.9 % — SIGNIFICANT CHANGE UP (ref 13–44)
MAGNESIUM SERPL-MCNC: 1.7 MG/DL — SIGNIFICANT CHANGE UP (ref 1.6–2.6)
MCHC RBC-ENTMCNC: 26.6 PG — LOW (ref 27–34)
MCHC RBC-ENTMCNC: 32.5 GM/DL — SIGNIFICANT CHANGE UP (ref 32–36)
MCV RBC AUTO: 81.6 FL — SIGNIFICANT CHANGE UP (ref 80–100)
MONOCYTES # BLD AUTO: 0.48 K/UL — SIGNIFICANT CHANGE UP (ref 0–0.9)
MONOCYTES NFR BLD AUTO: 6.8 % — SIGNIFICANT CHANGE UP (ref 2–14)
NEUTROPHILS # BLD AUTO: 5.13 K/UL — SIGNIFICANT CHANGE UP (ref 1.8–7.4)
NEUTROPHILS NFR BLD AUTO: 73 % — SIGNIFICANT CHANGE UP (ref 43–77)
NRBC # BLD: 0 /100 WBCS — SIGNIFICANT CHANGE UP (ref 0–0)
NRBC # FLD: 0 K/UL — SIGNIFICANT CHANGE UP (ref 0–0)
PHOSPHATE SERPL-MCNC: 3.4 MG/DL — SIGNIFICANT CHANGE UP (ref 2.5–4.5)
PLATELET # BLD AUTO: 95 K/UL — LOW (ref 150–400)
POTASSIUM SERPL-MCNC: 4.1 MMOL/L — SIGNIFICANT CHANGE UP (ref 3.5–5.3)
POTASSIUM SERPL-SCNC: 4.1 MMOL/L — SIGNIFICANT CHANGE UP (ref 3.5–5.3)
PROT SERPL-MCNC: 6.6 G/DL — SIGNIFICANT CHANGE UP (ref 6–8.3)
RBC # BLD: 3.05 M/UL — LOW (ref 3.8–5.2)
RBC # FLD: 15.4 % — HIGH (ref 10.3–14.5)
SODIUM SERPL-SCNC: 137 MMOL/L — SIGNIFICANT CHANGE UP (ref 135–145)
WBC # BLD: 7.03 K/UL — SIGNIFICANT CHANGE UP (ref 3.8–10.5)
WBC # FLD AUTO: 7.03 K/UL — SIGNIFICANT CHANGE UP (ref 3.8–10.5)

## 2023-03-01 PROCEDURE — 99239 HOSP IP/OBS DSCHRG MGMT >30: CPT | Mod: GC

## 2023-03-01 RX ORDER — SITAGLIPTIN 50 MG/1
1 TABLET, FILM COATED ORAL
Qty: 30 | Refills: 0
Start: 2023-03-01 | End: 2023-03-30

## 2023-03-01 RX ORDER — REPAGLINIDE 1 MG/1
0.5 TABLET ORAL
Qty: 45 | Refills: 0
Start: 2023-03-01 | End: 2023-03-30

## 2023-03-01 RX ORDER — REPAGLINIDE 1 MG/1
0.5 TABLET ORAL
Qty: 45 | Refills: 0 | DISCHARGE
Start: 2023-03-01 | End: 2023-03-30

## 2023-03-01 RX ORDER — ISOPROPYL ALCOHOL, BENZOCAINE .7; .06 ML/ML; ML/ML
1 SWAB TOPICAL
Qty: 100 | Refills: 1
Start: 2023-03-01 | End: 2023-04-19

## 2023-03-01 RX ADMIN — Medication 125 MICROGRAM(S): at 05:20

## 2023-03-01 RX ADMIN — HEPARIN SODIUM 5000 UNIT(S): 5000 INJECTION INTRAVENOUS; SUBCUTANEOUS at 11:49

## 2023-03-01 RX ADMIN — Medication 3: at 11:50

## 2023-03-01 RX ADMIN — HEPARIN SODIUM 5000 UNIT(S): 5000 INJECTION INTRAVENOUS; SUBCUTANEOUS at 05:20

## 2023-03-01 RX ADMIN — Medication 50 MILLIGRAM(S): at 11:49

## 2023-03-01 RX ADMIN — LINAGLIPTIN 5 MILLIGRAM(S): 5 TABLET, FILM COATED ORAL at 11:49

## 2023-03-01 RX ADMIN — Medication 50 MILLIGRAM(S): at 05:20

## 2023-03-01 RX ADMIN — BUMETANIDE 1 MILLIGRAM(S): 0.25 INJECTION INTRAMUSCULAR; INTRAVENOUS at 05:20

## 2023-03-01 RX ADMIN — PANTOPRAZOLE SODIUM 40 MILLIGRAM(S): 20 TABLET, DELAYED RELEASE ORAL at 05:20

## 2023-03-01 RX ADMIN — BUMETANIDE 1 MILLIGRAM(S): 0.25 INJECTION INTRAMUSCULAR; INTRAVENOUS at 11:49

## 2023-03-01 NOTE — PROGRESS NOTE ADULT - PROBLEM SELECTOR PLAN 7
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

## 2023-03-01 NOTE — PROGRESS NOTE ADULT - PROBLEM SELECTOR PROBLEM 3
Congestive heart failure
Hyperkalemia
Hyperkalemia
Hypoglycemia
Hypoglycemia
Hyperkalemia
Congestive heart failure
Congestive heart failure

## 2023-03-01 NOTE — PROGRESS NOTE ADULT - ATTENDING COMMENTS
ISAMAR on CKD   Improving Hyponatremia and Hyperkalemia   continue with home dose diuresis   Further detailed plan of management and recommendation as outlined above by the renal fellow.  Assessment and plan as outlined above. Monitor labs and urine output. Avoid any potential nephrotoxins. Dose medications as per eGFR.
ISAMAR on CKD   Improving ISAMAR, Hyponatremia, and Hyperkalemia   continue with home dose diuresis   Further detailed plan of management and recommendation as outlined above by the renal fellow.  Assessment and plan as outlined above. Monitor labs and urine output. Avoid any potential nephrotoxins. Dose medications as per eGFR.  Renal service will sign off at this time. please re-consult if needed
58 y/o F with pmhx of hypothyroidism, HTN, T2DM, HLD, CKD3, HFpef, PAH, asthma who presents for generalized weakness and chest pain. Noted to have severe bradycardia, hypothermia w/ labs notable for acute on chronic hyponatremia and hyperkalemia. Concern was for myxedema coma, however, per endo pts mild hypothyroidism, not explaining the current situation of bradycardia and hypothermia. Hyperkalemia resolved s/p albuterol, insulin + dextrose and lokelma. Hyponatremia improved as well.     -Bradycardia likely multifactorial in setting of hypothyroidism and home BB use. Now s/p TTE personally reviewed and notable for EF 72 %, diastolic dysfunction (Stage II), Moderate-severe tricuspid regurgitation and severe pulmonary hypertension. Continue to hold beta blocker- including on discharge.  Bradycardia now improved/resolved.  -Hypothermia- Now resolved as of 2/27. TSH elevated and now improving. However, overall unclear etiology of hypothermia as TSH level was not significantly elevated to explain this.  Low suspicion for concurrent adrenal crisis given cortisol level, so no need for further steroids at this time. Infectious w/u unremarkable.  -TSH elevated to 11.56 with T4 5.92. Pt received LTX load of 300mg x1 and hydrocortisone load 100mg . Was on levothyroxine 94 mcg IV qd  now switched to PO 125mcg qd. Per endo will repeat TFTs in 1 week if remains inpatient on 3/6/23 vs outpt in 2-3 weeks   -Noted transaminitis. No Localized RUQ pain.  Acute hepatitis panel neg. Recent HIV test neg. RUQ U/S Mildly increased echogenicity of the liver suggestive of hepatic steatosis w/ no biliary ductal dilatation. Trend - continues to improve. Outpt f/u     Rest of care as stated above    Dc planning for today. Plan and f/u d/w patient. Endo discharge recs for hypothyroidism and DM2 also appreciated. Further details outlined in discharge documentation. Spent 38 min in discharge planning and coordination.     D/w pt and HS2
58 y/o F with pmhx of hypothyroidism, HTN, T2DM, HLD, CKD3, HFpef, PAH, asthma who presents for generalized  weakness and chest pain. Noted to have severe bradycardia, hypothermia w/ labs notable for acute on chronic hyponatremia and hyperkalemia. Concern was for myxedema coma, however, per endo pts mild hypothyroidism, not explaining the current situation of bradycardia and hypothermia. Hyperkalemia resolved s/p albuterol, insulin + dextrose and lokelma. Hyponatremia improving.     -Bradycardia likely multifactorial in setting of hypothyroidism and home BB use. Now s/p TTE personally reviewed and notable for EF 72 %, diastolic dysfunction (Stage II), Moderate-severe tricuspid regurgitation and severe pulmonary hypertension. Continue to hold beta blocker.   -Hypothermia- still has intermittent episodes. Cortisol level elevated 25,8, low suspicion for concurrent adrenal crisis, so no need for further steroids at this time. Infectious w/u unremarkable.   - TSH elevated to 11.56 with T4 5.92. Pt received LTX load of 300mg x1 and hydrocortisone load 100mg . WIll c/w levothyroxine 94 mcg IV qd for now per endo recs and f/u freeT4, T3 q 1-2 days for now.   -Noted transaminitis . Acute hepatitis panel neg. Recent HIV test neg. RUQ U/S Mildly increased echogenicity of the liver suggestive of hepatic steatosis w/ no biliary ductal dilatation. Trend CMP    Rest of care as stated above
In brief, 56 y/o F with pmhx of hypothyroidism, HTN, T2DM, HLD, CKD, PAH, asthma who presents for generalized  weakness and chest pain. patient noted to have severe bradycardia to 28, hypothermia to 90.5. Labs notable for acute on chronic hyponatremia to 121 with hyperkalemia to 5.9.     #Myxedema coma  - patient presents with fatigue, vague chest pain, found to have severe bradycardia with associated electrolyte abnormalities, with high suspicion for myxedema coma   - TSH elevated to 11.56 with T4 5.92, received LTX load of 300mg x1 and hydrocortisone load 100mg   - family states patient has been complaint with 125 mcg LTX at home  - MICU consulted, deemed not a MICU candidate   - Low Threshold to reconsult MICU should patient decompensate (has been intubated and on vasopressors at hospitalization in OSH for myxedema coma)   - cortisol level elevated 25,8, low suspicion for concurrent adrenal crisis, so no need for further steroids at this time   - endo consulted recommending 94 mcg IV qd,   - received hyperK cocktail x2 , K still elevated to 6.3 will consult nephrology for further recs, will trend CMP   - VS q4 hours    #Transaminitis   - AST//167 with ALP 1137  - Acute hepatitis panel   - RUQ U/S Mildly increased echogenicity of the liver suggestive of hepatic   steatosis. No biliary ductal dilatation.    - trend CMP    #Anemia  - currently near baseline,  - maintain active T&S, transfuse for Hgb <7 .
56 y/o F with pmhx of hypothyroidism, HTN, T2DM, HLD, CKD3, HFpef, PAH, asthma who presents for generalized  weakness and chest pain. Noted to have severe bradycardia, hypothermia w/ labs notable for acute on chronic hyponatremia and hyperkalemia. Concern was for myxedema coma, however, per endo pts mild hypothyroidism, not explaining the current situation of bradycardia and hypothermia. Hyperkalemia resolved s/p albuterol, insulin + dextrose and lokelma. Hyponatremia improving.     -Bradycardia likely multifactorial in setting of hypothyroidism and home BB use. Now s/p TTE personally reviewed and notable for EF 72 %, diastolic dysfunction (Stage II), Moderate-severe tricuspid regurgitation and severe pulmonary hypertension. Continue to hold beta blocker.  Bradycardia improved.  -Hypothermia- Still has intermittent  hypothermia (last yesterday on 2/27). TSH elevated and now improving. However, overall unclear etiology of hypothermia as TSH level was not significantly elevated to explain this.  Low suspicion for concurrent adrenal crisis given cortisol level, so no need for further steroids at this time. Infectious w/u unremarkable.  -TSH elevated to 11.56 with T4 5.92. Pt received LTX load of 300mg x1 and hydrocortisone load 100mg . Was on levothyroxine 94 mcg IV qd  now switched to PO 125mcg qd. Per endo will repeat TFTs in 1 week if remains inpatient on 3/6/23 vs outpt  -Noted transaminitis. No Localized RUQ pain.  Acute hepatitis panel neg. Recent HIV test neg. RUQ U/S Mildly increased echogenicity of the liver suggestive of hepatic steatosis w/ no biliary ductal dilatation. Trend - improving on todays labs    Rest of care as stated above    Dc planning if pt remains stable.     D/w pt and HS2

## 2023-03-01 NOTE — PROGRESS NOTE ADULT - PROBLEM SELECTOR PROBLEM 2
Acute kidney injury superimposed on CKD
Acute kidney injury superimposed on CKD
Type 2 diabetes mellitus
Type 2 diabetes mellitus
Hyponatremia
Acute kidney injury superimposed on CKD
Hyponatremia
Bradycardia

## 2023-03-01 NOTE — PROGRESS NOTE ADULT - PROBLEM SELECTOR PLAN 1
has been chronically ravinder at home since last admission, perhaps precipitated by poorly controlled hypothyroidism (though as per daughter has been taking medications) and use of labetalol at home  now in 50s  Ekg sinus ravinder w/ first degree AV block  responded to tx of hypothermia, hyperkalemia and discontinuation BB  -manage hypothyroidism  -c/w bearhugger  -hold BB  -if ravinder < 40 address reversible causes and then can consider atropine -> consider EP/MICU eval; MICU suggesting dopamine infusion if ravinder  if unstable, ACLS  -tele monitoring    #Hypothermia  ~90 on admission now normothermic s/p blanket  Unclear why patient so hypothermic; not related to endo cause as per endo  possible infection but no leukocytosis or infectious symptoms. few bacteria on U/A but no other findings on U/A suggestive of infection or patient/family noting urinary symptoms  hypoglycemica noted -> possible contribution though sugars now ok  -bear hugger  -endo recs -> do not think it is an endo issue; could be related to hypothalamic dysfunction -> outpt workup w/ MRI?  -f/u BCx drawn in ED  -monitor off Abx

## 2023-03-01 NOTE — DISCHARGE NOTE NURSING/CASE MANAGEMENT/SOCIAL WORK - PATIENT PORTAL LINK FT
You can access the FollowMyHealth Patient Portal offered by [facility] by registering at the following website: [email or web address]. By joining [facility]’s FollowMyHealth portal, you will also be able to view your health information using other applications (apps) compatible with our system.

## 2023-03-01 NOTE — PROGRESS NOTE ADULT - PROBLEM SELECTOR PLAN 6
5.9 on admission, improved to 5.5 s/p albuterol, insulin + dextrose and lokelma  no Ekg changes  likely in setting of ISAMAR  now improved as ISAMAR improving  -monitor BMP
Last echo in Jan 2023 EF 61%. Mild diastolic dysfunction. RV enlargement and RV systolic dysfunction. Mod pulm HTN  BNP 2919, higher than prior hospitalizations  CXR w/ mild pulm congestion. POCUS w/ some B lines. Lung exam unremarkable  Transaminitis 2/2 to congestive hepatopathy?  gave one dose   - repeat TTE  - strict I/Os  - daily weights    #chest pain  pleuritic chest pain past few days in left side of chest under breast, not reproducible on palpation  Ekg w/o ischemic changes  no hypoxia or tachycardia to suggest PE  uremic pericarditis on ddx ?  Trop 46  -trend trop to peak  -tele monitoring
5.9 on admission, improved to 5.5 s/p albuterol, insulin + dextrose and lokelma  no Ekg changes  likely in setting of ISAMAR  now improved as ISAMAR improving  -monitor BMP
5.9 on admission, improved to 5.5 s/p albuterol, insulin + dextrose and lokelma  no Ekg changes  likely in setting of ISAMAR  now improved as ISAMAR improving  -monitor BMP

## 2023-03-01 NOTE — PROGRESS NOTE ADULT - PROBLEM SELECTOR PLAN 9
On Levemir 3 at bedtime and prandin 1 tid   last A1C 6.9% in Jan 23  repeat A1C 6.1%  -check A1c  -dc Lantus as hypoglycemic O/N  -low dose ISS  -FS qAC and qHS  -start Tradjenta in hospital; can do Januvia on dc, will price check (~20 dollars/month)

## 2023-03-01 NOTE — PROGRESS NOTE ADULT - PROBLEM SELECTOR PLAN 3
Last echo in Jan 2023 EF 61%. Mild diastolic dysfunction. RV enlargement and RV systolic dysfunction. Mod pulm HTN  BNP 2919, higher than prior hospitalizations  CXR w/ mild pulm congestion. POCUS w/ some B lines. Lung exam unremarkable  Transaminitis 2/2 to congestive hepatopathy?    - repeat TTE as profound bradycardia at admission -> severe PH and mod-severe TR -> outpatient cards and pulm f/u  - strict I/Os  - daily weights  - restart hydral for afterload reduction    #chest pain  pleuritic chest pain past few days in left side of chest under breast, at times was reproducible, and is exacerbated with movement to one side  Ekg w/o ischemic changes  no hypoxia or tachycardia to suggest PE  Trops peaked  -tele monitoring

## 2023-03-01 NOTE — PROGRESS NOTE ADULT - PROVIDER SPECIALTY LIST ADULT
Internal Medicine
Endocrinology
Endocrinology
Nephrology
Nephrology
Internal Medicine

## 2023-03-01 NOTE — PROGRESS NOTE ADULT - PROBLEM SELECTOR PLAN 8
hgb 7.2 at admission  No signs of bleeding   stable from prior, likely in setting of ACD/CKD  microcytic  drop to 6.3 2/26 -> 1 unit ordered  -check iron panel -> suggestive of ACD  -active T&S  -transfuse for Hgb < 7    Thrombocytopenia  stable from prior  could be insetting of hypothermia (bone marrow sequestration)  -trend

## 2023-03-01 NOTE — DISCHARGE NOTE NURSING/CASE MANAGEMENT/SOCIAL WORK - NSDCPEFALRISK_GEN_ALL_CORE
For information on Fall & Injury Prevention, visit: [email or web address] OR  [email or web address] OR  https://www.cdc.gov/steadi/patient.html

## 2023-03-01 NOTE — PROGRESS NOTE ADULT - PROBLEM SELECTOR PROBLEM 1
Acute kidney injury superimposed on CKD
Hyponatremia
Hypothyroidism
Hypothyroidism
Acute kidney injury superimposed on CKD
Bradycardia

## 2023-03-01 NOTE — CHART NOTE - NSCHARTNOTEFT_GEN_A_CORE
57 year old female with history of hypothyroidism, HTN, Dm Type 2, CKD, PAH, asthma on albuterol who presented to the ED with weakness and right sided chest pain found to be bradycardia and hypothermic. TSH was 11.5 in the setting of previous history of myxedema and then was consulted based on that. Patient reported adherence with her levothyroxine outpatient.     Contacted by primary team for discharge recommendations  In regards to hypothyroidism, see prior endocrine progress notes for complete plan of care. For discharge, patient should resume home dose of Levothyroxine 125 mcg PO daily. Please ensure she follows up with her PCP for repeat TFTs in 4-6 weeks. If she remains admitted, can recheck FT4 in inpatient setting on 3/6/23  In regards to DM 2: Patient was taking Tresiba 3 units qHS, Prandin 1 mg TID before meals, having hypoglycemia at home, A1c 6.1%  Recommend to discharge on DPP4i: Januvia 25 mg daily (covered per team), plus reduce Prandin to 0.5 mg PO TID before meals (Hold if skips meal)  STOP Tresiba, STOP Glyburide (was taking in the past)  Ensure she has glucometer, glucose test strips, lancets, alcohol swabs   Followup with PCP  Reviewed recommendations with primary team MD Savage    CAPILLARY BLOOD GLUCOSE    POCT Blood Glucose.: 149 mg/dL (01 Mar 2023 07:35)  POCT Blood Glucose.: 183 mg/dL (28 Feb 2023 22:27)  POCT Blood Glucose.: 195 mg/dL (28 Feb 2023 16:35)  POCT Blood Glucose.: 214 mg/dL (28 Feb 2023 11:29)      03-01    137  |  102  |  81<H>  ----------------------------<  156<H>  4.1   |  22  |  1.94<H>    Ca    9.4      01 Mar 2023 05:08  Phos  3.4     03-01  Mg     1.70     03-01    TPro  6.6  /  Alb  3.6  /  TBili  0.4  /  DBili  x   /  AST  26  /  ALT  65<H>  /  AlkPhos  819<H>  03-01      MEDICATIONS  (STANDING):  buMETAnide 1 milliGRAM(s) Oral two times a day  dextrose 50% Injectable 25 Gram(s) IV Push once  dextrose 50% Injectable 12.5 Gram(s) IV Push once  dextrose 50% Injectable 25 Gram(s) IV Push once  dextrose Oral Gel 15 Gram(s) Oral once  glucagon  Injectable 1 milliGRAM(s) IntraMuscular once  heparin   Injectable 5000 Unit(s) SubCutaneous every 8 hours  hydrALAZINE 50 milliGRAM(s) Oral every 8 hours  influenza   Vaccine 0.5 milliLiter(s) IntraMuscular once  insulin lispro (ADMELOG) corrective regimen sliding scale   SubCutaneous three times a day before meals  insulin lispro (ADMELOG) corrective regimen sliding scale   SubCutaneous at bedtime  levothyroxine 125 MICROGram(s) Oral daily  linagliptin 5 milliGRAM(s) Oral daily  pantoprazole    Tablet 40 milliGRAM(s) Oral before breakfast      Diet, DASH/TLC:   Sodium & Cholesterol Restricted  Consistent Carbohydrate {No Snacks} (CSTCHO)  1500mL Fluid Restriction ([id number]) (02-25-23 @ 19:21)      A1C with Estimated Average Glucose Result: 6.1 % (02-26-23 @ 05:32)  A1C with Estimated Average Glucose Result: 6.9 % (01-17-23 @ 07:10)  A1C with Estimated Average Glucose Result: 9.7 % (11-25-22 @ 14:20)  A1C with Estimated Average Glucose Result: 9.1 % (09-29-22 @ 06:00)  A1C with Estimated Average Glucose Result: 9.2 % (09-28-22 @ 07:47)      Deedee Vu  Nurse Practitioner  Division of Endocrinology & Diabetes  In house pager #[phone]/long range pager #[phone]    If before 9AM or after 6PM, or on weekends/holidays, please call endocrine answering service for assistance ([phone]).  For nonurgent matters email [email or web address] for assistance.

## 2023-03-01 NOTE — PROGRESS NOTE ADULT - SUBJECTIVE AND OBJECTIVE BOX
Sultan Hussein MD  PGY 1 Department of Internal Medicine        Patient is a 57y old  Female who presents with a chief complaint of bradycardia (28 Feb 2023 13:29)      SUBJECTIVE / OVERNIGHT EVENTS: Pt seen and examined. No acute overnight events. Denies fevers, chills, CP, SOB, Abdominal pain, N/V, Constipation, Diarrhea        MEDICATIONS  (STANDING):  buMETAnide 1 milliGRAM(s) Oral two times a day  dextrose 50% Injectable 25 Gram(s) IV Push once  dextrose 50% Injectable 12.5 Gram(s) IV Push once  dextrose 50% Injectable 25 Gram(s) IV Push once  dextrose Oral Gel 15 Gram(s) Oral once  glucagon  Injectable 1 milliGRAM(s) IntraMuscular once  heparin   Injectable 5000 Unit(s) SubCutaneous every 8 hours  hydrALAZINE 50 milliGRAM(s) Oral every 8 hours  influenza   Vaccine 0.5 milliLiter(s) IntraMuscular once  insulin lispro (ADMELOG) corrective regimen sliding scale   SubCutaneous three times a day before meals  insulin lispro (ADMELOG) corrective regimen sliding scale   SubCutaneous at bedtime  levothyroxine 125 MICROGram(s) Oral daily  linagliptin 5 milliGRAM(s) Oral daily  pantoprazole    Tablet 40 milliGRAM(s) Oral before breakfast    MEDICATIONS  (PRN):  acetaminophen     Tablet .. 650 milliGRAM(s) Oral every 6 hours PRN Mild Pain (1 - 3), Moderate Pain (4 - 6)      I&O's Summary    27 Feb 2023 07:01  -  28 Feb 2023 07:00  --------------------------------------------------------  IN: 380 mL / OUT: 980 mL / NET: -600 mL    28 Feb 2023 07:01  -  01 Mar 2023 05:08  --------------------------------------------------------  IN: 700 mL / OUT: 650 mL / NET: 50 mL        Vital Signs Last 24 Hrs  T(C): 36.6 (01 Mar 2023 01:30), Max: 36.7 (28 Feb 2023 08:02)  T(F): 97.9 (01 Mar 2023 01:30), Max: 98.1 (28 Feb 2023 21:30)  HR: 73 (01 Mar 2023 01:30) (60 - 73)  BP: 150/69 (01 Mar 2023 01:30) (130/62 - 161/72)  BP(mean): --  RR: 18 (01 Mar 2023 01:30) (17 - 18)  SpO2: 100% (01 Mar 2023 01:30) (99% - 100%)    Parameters below as of 01 Mar 2023 01:30  Patient On (Oxygen Delivery Method): room air        CAPILLARY BLOOD GLUCOSE      POCT Blood Glucose.: 183 mg/dL (28 Feb 2023 22:27)  POCT Blood Glucose.: 195 mg/dL (28 Feb 2023 16:35)  POCT Blood Glucose.: 214 mg/dL (28 Feb 2023 11:29)  POCT Blood Glucose.: 102 mg/dL (28 Feb 2023 07:40)      PHYSICAL EXAM:  GENERAL APPEARANCE: NAD, alert and awake  HEENT:  PERRL, EOMI. MMM  NECK: Neck supple, non-tender no lymphadenopathy, masses or thyromegaly  CARDIAC: Normal S1 and S2. Systolic murmur. RRR.   LUNGS: Clear to auscultation B/L, no rales, rhonchi, or wheezing appreciated  ABDOMEN: Soft , NTND, bowel sounds normal. No guarding or rebound.   MUSCULOSKELETAL: ROM intact.  No joint erythema or tenderness.   EXTREMITIES: No pitting edema b/l LEs. Peripheral pulses intact.   NEUROLOGICAL: Non focal. Strength exam much improved  SKIN: Warm and dry , Well perfused  PSYCHIATRIC: AOx3       LABS:                        8.2    7.31  )-----------( 113      ( 28 Feb 2023 06:27 )             25.4     Auto Eosinophil # 0.37  / Auto Eosinophil % 5.1   / Auto Neutrophil # 5.06  / Auto Neutrophil % 69.1  / BANDS % x                            8.1    6.45  )-----------( 93       ( 27 Feb 2023 05:42 )             24.0     Auto Eosinophil # 0.32  / Auto Eosinophil % 5.0   / Auto Neutrophil # 4.80  / Auto Neutrophil % 74.3  / BANDS % x        02-28    134<L>  |  100  |  86<H>  ----------------------------<  85  4.0   |  20<L>  |  1.88<H>  02-27    134<L>  |  101  |  100<H>  ----------------------------<  99  4.2   |  20<L>  |  2.10<H>    Ca    9.6      28 Feb 2023 06:27  Mg     1.70     02-28  Phos  3.7     02-28  TPro  6.9  /  Alb  3.6  /  TBili  0.4  /  DBili  x   /  AST  40<H>  /  ALT  87<H>  /  AlkPhos  966<H>  02-28  TPro  6.7  /  Alb  3.7  /  TBili  0.4  /  DBili  x   /  AST  60<H>  /  ALT  110<H>  /  AlkPhos  1032<H>  02-27    PT/INR - ( 27 Feb 2023 05:42 )   PT: 13.5 sec;   INR: 1.16 ratio         PTT - ( 27 Feb 2023 05:42 )  PTT:49.5 sec              RADIOLOGY & ADDITIONAL TESTS:    Imaging Personally Reviewed:    Consultant(s) Notes Reviewed:      Care Discussed with Consultants/Other Providers:   Sultan Hussein MD  PGY 1 Department of Internal Medicine        Patient is a 57y old  Female who presents with a chief complaint of bradycardia (28 Feb 2023 13:29)      SUBJECTIVE / OVERNIGHT EVENTS: Pt seen and examined. No acute overnight events. Denies fevers, chills, CP, SOB, Abdominal pain, N/V, Constipation, Diarrhea. Sinus w/ 1st degree AV block on tele as before. Patient continues to feel well. Occasionally feels well but actually felt hot under warming blanket last night.        MEDICATIONS  (STANDING):  buMETAnide 1 milliGRAM(s) Oral two times a day  dextrose 50% Injectable 25 Gram(s) IV Push once  dextrose 50% Injectable 12.5 Gram(s) IV Push once  dextrose 50% Injectable 25 Gram(s) IV Push once  dextrose Oral Gel 15 Gram(s) Oral once  glucagon  Injectable 1 milliGRAM(s) IntraMuscular once  heparin   Injectable 5000 Unit(s) SubCutaneous every 8 hours  hydrALAZINE 50 milliGRAM(s) Oral every 8 hours  influenza   Vaccine 0.5 milliLiter(s) IntraMuscular once  insulin lispro (ADMELOG) corrective regimen sliding scale   SubCutaneous three times a day before meals  insulin lispro (ADMELOG) corrective regimen sliding scale   SubCutaneous at bedtime  levothyroxine 125 MICROGram(s) Oral daily  linagliptin 5 milliGRAM(s) Oral daily  pantoprazole    Tablet 40 milliGRAM(s) Oral before breakfast    MEDICATIONS  (PRN):  acetaminophen     Tablet .. 650 milliGRAM(s) Oral every 6 hours PRN Mild Pain (1 - 3), Moderate Pain (4 - 6)      I&O's Summary    27 Feb 2023 07:01  -  28 Feb 2023 07:00  --------------------------------------------------------  IN: 380 mL / OUT: 980 mL / NET: -600 mL    28 Feb 2023 07:01  -  01 Mar 2023 05:08  --------------------------------------------------------  IN: 700 mL / OUT: 650 mL / NET: 50 mL        Vital Signs Last 24 Hrs  T(C): 36.6 (01 Mar 2023 01:30), Max: 36.7 (28 Feb 2023 08:02)  T(F): 97.9 (01 Mar 2023 01:30), Max: 98.1 (28 Feb 2023 21:30)  HR: 73 (01 Mar 2023 01:30) (60 - 73)  BP: 150/69 (01 Mar 2023 01:30) (130/62 - 161/72)  BP(mean): --  RR: 18 (01 Mar 2023 01:30) (17 - 18)  SpO2: 100% (01 Mar 2023 01:30) (99% - 100%)    Parameters below as of 01 Mar 2023 01:30  Patient On (Oxygen Delivery Method): room air        CAPILLARY BLOOD GLUCOSE      POCT Blood Glucose.: 183 mg/dL (28 Feb 2023 22:27)  POCT Blood Glucose.: 195 mg/dL (28 Feb 2023 16:35)  POCT Blood Glucose.: 214 mg/dL (28 Feb 2023 11:29)  POCT Blood Glucose.: 102 mg/dL (28 Feb 2023 07:40)      PHYSICAL EXAM:  GENERAL APPEARANCE: NAD, alert and awake  HEENT:  PERRL, EOMI. MMM  NECK: Neck supple, non-tender no lymphadenopathy, masses or thyromegaly  CARDIAC: Normal S1 and S2. Systolic murmur. RRR.   LUNGS: Clear to auscultation B/L, no rales, rhonchi, or wheezing appreciated  ABDOMEN: Soft , NTND, bowel sounds normal. No guarding or rebound.   MUSCULOSKELETAL: ROM intact.  No joint erythema or tenderness.   EXTREMITIES: No pitting edema b/l LEs. Peripheral pulses intact.   NEUROLOGICAL: Non focal. Strength exam much improved  SKIN: Warm and dry , Well perfused  PSYCHIATRIC: AOx3       LABS:                        8.2    7.31  )-----------( 113      ( 28 Feb 2023 06:27 )             25.4     Auto Eosinophil # 0.37  / Auto Eosinophil % 5.1   / Auto Neutrophil # 5.06  / Auto Neutrophil % 69.1  / BANDS % x                            8.1    6.45  )-----------( 93       ( 27 Feb 2023 05:42 )             24.0     Auto Eosinophil # 0.32  / Auto Eosinophil % 5.0   / Auto Neutrophil # 4.80  / Auto Neutrophil % 74.3  / BANDS % x        02-28    134<L>  |  100  |  86<H>  ----------------------------<  85  4.0   |  20<L>  |  1.88<H>  02-27    134<L>  |  101  |  100<H>  ----------------------------<  99  4.2   |  20<L>  |  2.10<H>    Ca    9.6      28 Feb 2023 06:27  Mg     1.70     02-28  Phos  3.7     02-28  TPro  6.9  /  Alb  3.6  /  TBili  0.4  /  DBili  x   /  AST  40<H>  /  ALT  87<H>  /  AlkPhos  966<H>  02-28  TPro  6.7  /  Alb  3.7  /  TBili  0.4  /  DBili  x   /  AST  60<H>  /  ALT  110<H>  /  AlkPhos  1032<H>  02-27    PT/INR - ( 27 Feb 2023 05:42 )   PT: 13.5 sec;   INR: 1.16 ratio         PTT - ( 27 Feb 2023 05:42 )  PTT:49.5 sec              RADIOLOGY & ADDITIONAL TESTS:    Imaging Personally Reviewed:    Consultant(s) Notes Reviewed:      Care Discussed with Consultants/Other Providers:

## 2023-03-01 NOTE — PROGRESS NOTE ADULT - PROBLEM SELECTOR PLAN 5
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  hypothermia, bradycardia, fatigue might be related  TSH elevated at 11.51, T3 low at 55, T4 normal  initial concern for hypoadrenalism (given hydrocortisone x 1) given electrolyte abnormalities and clinical picture but cortisol AM not low  given stress dose levothyroxine 300 IV  -endo emailed -> recommending 94 IV levothyroxine QD for now
Na 121 at admission (associated with hyperkalemia)  Appears to have had hyponatremia in past associated with ISAMAR on CKD and fluid overload  CXR with some mild pulmonary congestion, BNP elevated over b/l suggestive of some fluid overload but on PE equivocal for fluid overload as normal lung exam, minimal edema  Na to 126 s/p 500 cc NS bolus, perhaps due to getting hypertonic fluid  Could be multifactorial as equivocal volume exam; 2/2 to hypothyroidism vs SIADH vs fluid overload vs overdiuresis   Now close to b/l (low 130s)  -can check urine Osm, urine Na though given patient on diuretics on home and received fluids, likely to be spurious -> urine Na high, urine Osm ~ 300, calculate serum Osm hypertonic (high BUN)  -given Bumex IV 1 x 1; c/w home Bumex as per nephro  -avoid Na increase more than 6-8 in 24 hour period  -f/u nephro recs

## 2023-03-02 ENCOUNTER — APPOINTMENT (OUTPATIENT)
Dept: ENDOCRINOLOGY | Facility: CLINIC | Age: 58
End: 2023-03-02
Payer: COMMERCIAL

## 2023-03-02 VITALS
BODY MASS INDEX: 22.08 KG/M2 | OXYGEN SATURATION: 99 % | SYSTOLIC BLOOD PRESSURE: 138 MMHG | DIASTOLIC BLOOD PRESSURE: 80 MMHG | WEIGHT: 120 LBS | HEIGHT: 62 IN | HEART RATE: 79 BPM

## 2023-03-02 DIAGNOSIS — I50.30 UNSPECIFIED DIASTOLIC (CONGESTIVE) HEART FAILURE: ICD-10-CM

## 2023-03-02 PROCEDURE — 99215 OFFICE O/P EST HI 40 MIN: CPT

## 2023-03-02 NOTE — PHYSICAL EXAM
[Alert] : alert [No Acute Distress] : no acute distress [Well Developed] : well developed [No Neck Mass] : no neck mass was observed [Supple] : the neck was supple [Thyroid Not Enlarged] : the thyroid was not enlarged [No Respiratory Distress] : no respiratory distress [Clear to Auscultation] : lungs were clear to auscultation bilaterally [Regular Rhythm] : with a regular rhythm [Pedal Pulses Normal] : the pedal pulses are present [Normal Bowel Sounds] : normal bowel sounds [Not Tender] : non-tender [Soft] : abdomen soft [Oriented x3] : oriented to person, place, and time [Normal Insight/Judgement] : insight and judgment were intact [de-identified] : Systolic murmur, loudest over right upper sternal border [de-identified] : Trace edema to feet [de-identified] : Normal sensation to pinprick. Subjective paresthesias over feet. Patellar, achilles reflex 0 b/l

## 2023-03-02 NOTE — ASSESSMENT
[FreeTextEntry1] : 1. Diabetes Mellitus \par Type: T2DM\par A1c: 6.1 (2/2023)\par Current Regimen: Januvia 25 mg daily, Prandin 1 mg TID before meals\par \par PLAN: \par - Regimen: continue Januvia 25 mg daily, Prandin 1 mg TID before meals\par \par - BG monitoring: FBG; family interested in CGM\par \par - Labs: CMP, Urine microalbumin/creatinine\par \par - Preventive: \par        Nephropathy screening: urine albumin/creatinine ordered, follows with nephrology\par        Retinopathy screening: Ophthalmologist UTD 2/2023\par        Foot exam/podiatrist: likely beginnings of neuropathy, advised to see podiatrist. Foot exam 3/2/23. \par \par - Counseling: We discussed diabetes foot care, long term complications of diabetes including but not limited to neuropathy, nephropathy, retinopathy and cardiovascular disease and the benefits of good glycemic control in preventing said complications. We discussed the risks and benefits of diabetes medications and/or insulin as relevant for today, prevention and management of hypoglycemia, importance of medication compliance and blood glucose monitoring.\par \par 2. Hypothyroidism: Diagnosed 3/2022. H/o myxedema coma. \par - During hospitalization (2/2023): TSH: 11.5, Free T3 55, Total T4 2.5\par - Continue Levothyroxine 125 mcg daily \par - we discussed the importance of medication hygiene:\par           Take levothyroxine with water on an empty stomach, at least 30 minutes before any food/beverages or other medication intake. \par           Space any iron or calcium containing supplements by at least 4 hours after/before levothyroxine intake. \par - repeat TSH and free T4 ordered today\par \par 3. Hypertension\par BP goal <130/90\par Current medications: Hydralazine 50 mg q8hr\par - Continue management and medication titration with PCP\par \par 4. Hyperlipidemia\par Last LDL: 59 (12/2022)\par LDL goal: <70\par - Continue atorvastatin 40 mg daily \par \par 5. CKD3b: Likely 2/2 HTN, DM\par -During hospitalization: eGFR 30 \par -follows with Nephrology\par -no prior testing for DM nephropathy noted on chart review\par -labs: CMP, urine microalbumin/creatinine ordered\par \par Return to clinic in 3 months. \par \par Reji Mac MD\par [facility]\par Endocrinology at [place] \par [address], [address]\par Ph: [phone]\par Fax: [phone]\par \par

## 2023-03-03 ENCOUNTER — NON-APPOINTMENT (OUTPATIENT)
Age: 58
End: 2023-03-03

## 2023-03-03 LAB
ALBUMIN SERPL ELPH-MCNC: 4.3 G/DL
ALP BLD-CCNC: 886 U/L
ALT SERPL-CCNC: 62 U/L
ANION GAP SERPL CALC-SCNC: 17 MMOL/L
AST SERPL-CCNC: 25 U/L
BILIRUB SERPL-MCNC: 0.6 MG/DL
BUN SERPL-MCNC: 73 MG/DL
CALCIUM SERPL-MCNC: 10 MG/DL
CHLORIDE SERPL-SCNC: 99 MMOL/L
CO2 SERPL-SCNC: 22 MMOL/L
CREAT SERPL-MCNC: 1.98 MG/DL
CREAT SPEC-SCNC: 19 MG/DL
EGFR: 29 ML/MIN/1.73M2
GLUCOSE SERPL-MCNC: 338 MG/DL
MICROALBUMIN 24H UR DL<=1MG/L-MCNC: 49.6 MG/DL
MICROALBUMIN/CREAT 24H UR-RTO: 2638 MG/G
POTASSIUM SERPL-SCNC: 4.7 MMOL/L
PROT SERPL-MCNC: 7.4 G/DL
SODIUM SERPL-SCNC: 137 MMOL/L
T4 FREE SERPL-MCNC: 2.3 NG/DL
TSH SERPL-ACNC: 0.53 UIU/ML

## 2023-03-03 RX ORDER — FLASH GLUCOSE SENSOR
KIT MISCELLANEOUS
Qty: 2 | Refills: 6 | Status: ACTIVE | COMMUNITY
Start: 2023-03-02 | End: 1900-01-01

## 2023-03-03 RX ORDER — FLASH GLUCOSE SCANNING READER
EACH MISCELLANEOUS
Qty: 1 | Refills: 0 | Status: ACTIVE | COMMUNITY
Start: 2023-03-02 | End: 1900-01-01

## 2023-03-10 ENCOUNTER — APPOINTMENT (OUTPATIENT)
Dept: PULMONOLOGY | Facility: CLINIC | Age: 58
End: 2023-03-10
Payer: COMMERCIAL

## 2023-03-10 ENCOUNTER — MED ADMIN CHARGE (OUTPATIENT)
Age: 58
End: 2023-03-10

## 2023-03-10 VITALS
SYSTOLIC BLOOD PRESSURE: 154 MMHG | WEIGHT: 121 LBS | HEART RATE: 63 BPM | HEIGHT: 61 IN | DIASTOLIC BLOOD PRESSURE: 66 MMHG | BODY MASS INDEX: 22.84 KG/M2

## 2023-03-10 DIAGNOSIS — Z23 ENCOUNTER FOR IMMUNIZATION: ICD-10-CM

## 2023-03-10 DIAGNOSIS — N18.30 CHRONIC KIDNEY DISEASE, STAGE 3 UNSPECIFIED: ICD-10-CM

## 2023-03-10 PROCEDURE — 94010 BREATHING CAPACITY TEST: CPT

## 2023-03-10 PROCEDURE — G0009: CPT

## 2023-03-10 PROCEDURE — ZZZZZ: CPT

## 2023-03-10 PROCEDURE — 94726 PLETHYSMOGRAPHY LUNG VOLUMES: CPT

## 2023-03-10 PROCEDURE — 94729 DIFFUSING CAPACITY: CPT

## 2023-03-10 PROCEDURE — 90677 PCV20 VACCINE IM: CPT

## 2023-03-10 PROCEDURE — 99215 OFFICE O/P EST HI 40 MIN: CPT | Mod: 25

## 2023-03-10 NOTE — HISTORY OF PRESENT ILLNESS
[Never] : never [TextBox_4] : Ms. Hernandez is a 58 yo F w/ PMH of hypothyroidism w/ recent myxedema coma (Hospitalized 12/16/22 to 1/4/23 requiring intubation at [facility]), CKD III, asthma, pulmonary hypertension who presents for follow up. \par \par She had a prior hospitalization for acute hypoxic respiratory failure in the setting of pneumonia, heart failure exacerbation in Jan 2023. During this admission she was found to have pulmonary edema, bilateral pleural effusions, moderate pulmonary hypertension. She underwent RHC - found to have increased wedge pressure, elevated CVP abnd moderate to severe pulm htn. She states she currently takes Bumex bid.\par \par She was again admitted from February 25 to March 1, 2023.  Her most recent admission was due to symptomatic bradycardia.  Her heart rate was in the 20s.  She received atropine and epinephrine.  She was also found to be hypothermic to 90.5 °F.  Her EKG showed first-degree AV block and sinus bradycardia.  Her course was further complicated by hyperkalemia with a K of 5.9 and acute kidney injury.  She was also found to be fluid overloaded and required Bumex IV briefly.  She was treated with intravenous levothyroxine as well given the concern for gut malabsorption.  Bradycardia eventually corrected especially with the discontinuation of home labetalol and correction of electrolyte abnormalities and hypothermia.  \par \par Since discharge patient reports feeling much better.  Not currently short of breath. No current cough. No wheezing. No LE edema currently She denies limitations due to dyspnea. She states she is fully indepdent of all ADLs, IADLs, able to climb stairs, do laundry etc. she has no complaints at this time.  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 She denies history of smoking, dust exposures. She denies history of asthma - though carries the label on her chart. \par  \par

## 2023-03-10 NOTE — PHYSICAL EXAM
[No Acute Distress] : no acute distress [Normal Appearance] : normal appearance [No Neck Mass] : no neck mass [Normal Rate/Rhythm] : normal rate/rhythm [Normal S1, S2] : normal s1, s2 [No Resp Distress] : no resp distress [Clear to Auscultation Bilaterally] : clear to auscultation bilaterally [No Abnormalities] : no abnormalities [No Edema] : no edema [No Focal Deficits] : no focal deficits [Oriented x3] : oriented x3 [Normal Affect] : normal affect

## 2023-03-24 ENCOUNTER — APPOINTMENT (OUTPATIENT)
Dept: NEPHROLOGY | Facility: CLINIC | Age: 58
End: 2023-03-24
Payer: COMMERCIAL

## 2023-03-24 VITALS
HEART RATE: 52 BPM | DIASTOLIC BLOOD PRESSURE: 66 MMHG | HEIGHT: 61 IN | TEMPERATURE: 97 F | RESPIRATION RATE: 15 BRPM | SYSTOLIC BLOOD PRESSURE: 131 MMHG | BODY MASS INDEX: 23.41 KG/M2 | WEIGHT: 124 LBS | OXYGEN SATURATION: 96 %

## 2023-03-24 DIAGNOSIS — N18.32 CHRONIC KIDNEY DISEASE, STAGE 3B: ICD-10-CM

## 2023-03-24 PROCEDURE — 99214 OFFICE O/P EST MOD 30 MIN: CPT | Mod: GC

## 2023-03-24 RX ORDER — BUMETANIDE 1 MG/1
1 TABLET ORAL TWICE DAILY
Qty: 60 | Refills: 3 | Status: ACTIVE | COMMUNITY
Start: 2023-03-24

## 2023-03-24 RX ORDER — BLOOD SUGAR DIAGNOSTIC
STRIP MISCELLANEOUS
Qty: 4 | Refills: 0 | Status: ACTIVE | COMMUNITY
Start: 2023-03-24 | End: 1900-01-01

## 2023-03-24 RX ORDER — BLOOD-GLUCOSE METER
W/DEVICE EACH MISCELLANEOUS
Qty: 1 | Refills: 0 | Status: ACTIVE | COMMUNITY
Start: 2023-03-24 | End: 1900-01-01

## 2023-03-24 RX ORDER — TORSEMIDE 20 MG/1
20 TABLET ORAL DAILY
Qty: 30 | Refills: 3 | Status: DISCONTINUED | COMMUNITY
Start: 2022-11-07 | End: 2023-03-24

## 2023-03-24 RX ORDER — LABETALOL HYDROCHLORIDE 100 MG/1
100 TABLET, FILM COATED ORAL TWICE DAILY
Refills: 0 | Status: DISCONTINUED | COMMUNITY
Start: 2022-09-22 | End: 2023-03-24

## 2023-03-24 NOTE — END OF VISIT
[FreeTextEntry3] : I was physically present for the key portions of the evaluation and management (E/M) service provided. I agree with the above history, ROS, physical exam, assessment, and plan which I have reviewed and edited where appropriate. I have also reviewed the assessment and management of CKD and HTN with the patient and her daughter during clinic visit today.\par \par Pt. with CKD stage 4 in setting of DM and HTN. BP in acceptable range during clinic visit today. Pt. with B/L LE edema, on oral diuretic therapy. Monitor BP and labs. Avoid nephrotoxins.

## 2023-03-24 NOTE — REVIEW OF SYSTEMS
[Lower Ext Edema] : lower extremity edema [Limb Swelling] : limb swelling [As Noted in HPI] : as noted in HPI [Fever] : no fever [Feeling Poorly] : not feeling poorly [Feeling Tired] : not feeling tired [Eye Pain] : no eye pain [Earache] : no earache [Sore Throat] : no sore throat [Chest Pain] : no chest pain [Palpitations] : no palpitations [Shortness Of Breath] : no shortness of breath [Cough] : no cough [Abdominal Pain] : no abdominal pain [Constipation] : no constipation [Dysuria] : no dysuria [Itching] : no itching [Confused] : no confusion [Dizziness] : no dizziness [Anxiety] : no anxiety [de-identified] : DM, hypothyroidism

## 2023-03-24 NOTE — HISTORY OF PRESENT ILLNESS
[FreeTextEntry1] : 57-year-old female with history of longstanding HTN, DM, anemia and CKD presents to clinic for follow-up visit after recent hospital stay. Pt. was last seen in clinic on 12/2/22.  \par \par Pt. noted to have recent 2 hospitalizations at [facility] since last visit. First hospitalization at [facility] (1/17/23-1/31/23) was for shortness of breath. Pt. was again hospitalized at [facility] (2/25/23-3/2/23) for bradycardia. During last hospitalization, oral Labetalol was discontinued. Last Scr was elevated/stable at 1.98 on 3/2/23. \par \par Pt. currently feels well but gives history of B/L LE edema. No fever, CP, SOB, HA or dizziness.

## 2023-03-24 NOTE — PHYSICAL EXAM
[General Appearance - Alert] : alert [General Appearance - In No Acute Distress] : in no acute distress [General Appearance - Well Nourished] : well nourished [General Appearance - Well Developed] : well developed [Sclera] : the sclera and conjunctiva were normal [Outer Ear] : the ears and nose were normal in appearance [Neck Appearance] : the appearance of the neck was normal [Jugular Venous Distention Increased] : there was no jugular-venous distention [Respiration, Rhythm And Depth] : normal respiratory rhythm and effort [Auscultation Breath Sounds / Voice Sounds] : lungs were clear to auscultation bilaterally [Heart Sounds] : normal S1 and S2 [Heart Sounds Gallop] : no gallops [Abdomen Soft] : soft [Abdomen Tenderness] : non-tender [No CVA Tenderness] : no ~M costovertebral angle tenderness [Nail Clubbing] : no clubbing  or cyanosis of the fingernails [] : no rash [Oriented To Time, Place, And Person] : oriented to person, place, and time [FreeTextEntry1] : B/L LE: mild pitting edema

## 2023-03-27 NOTE — ED ADULT NURSE NOTE - NSFALLRSKASSESASSIST_ED_ALL_ED
Patient called stating she missed the appointment today because she went to the wrong office      Patient can be reached at [phone] no

## 2023-04-05 ENCOUNTER — APPOINTMENT (OUTPATIENT)
Dept: CARDIOLOGY | Facility: CLINIC | Age: 58
End: 2023-04-05
Payer: COMMERCIAL

## 2023-04-05 VITALS
SYSTOLIC BLOOD PRESSURE: 166 MMHG | TEMPERATURE: 95 F | BODY MASS INDEX: 23.74 KG/M2 | WEIGHT: 129 LBS | HEART RATE: 53 BPM | DIASTOLIC BLOOD PRESSURE: 72 MMHG | OXYGEN SATURATION: 100 % | HEIGHT: 62 IN

## 2023-04-05 VITALS — SYSTOLIC BLOOD PRESSURE: 160 MMHG | DIASTOLIC BLOOD PRESSURE: 62 MMHG

## 2023-04-05 DIAGNOSIS — Z83.3 FAMILY HISTORY OF DIABETES MELLITUS: ICD-10-CM

## 2023-04-05 DIAGNOSIS — Z84.1 FAMILY HISTORY OF DISORDERS OF KIDNEY AND URETER: ICD-10-CM

## 2023-04-05 DIAGNOSIS — Z78.9 OTHER SPECIFIED HEALTH STATUS: ICD-10-CM

## 2023-04-05 DIAGNOSIS — R06.02 SHORTNESS OF BREATH: ICD-10-CM

## 2023-04-05 DIAGNOSIS — Z82.3 FAMILY HISTORY OF STROKE: ICD-10-CM

## 2023-04-05 PROCEDURE — 99214 OFFICE O/P EST MOD 30 MIN: CPT

## 2023-04-07 NOTE — DISCUSSION/SUMMARY
[FreeTextEntry1] : In a summary Shila Coley is a middle aged female with Hypertension, systolic BP high. Continue Hydralazine and cut down on salt intake. Follow up in 1 month . If BP still high will adjust medications. Bilateral leg edema, continue Bumetanide. Elevate legs. Hypercholesterolemia, continue Atorvastatin and low cholesterol diet. Diabetes, on medication and low Carb diet. Echo report reviewed. Continue follow up with Pulmonary and Nephrology.

## 2023-04-07 NOTE — PHYSICAL EXAM
[Normal Conjunctiva] : normal conjunctiva [No Carotid Bruit] : no carotid bruit [Normal S1, S2] : normal S1, S2 [Soft] : abdomen soft [Non Tender] : non-tender [Normal Bowel Sounds] : normal bowel sounds [Normal Gait] : normal gait [Normal] : alert and oriented, normal memory [de-identified] : bilateral 1+ pitting edema.

## 2023-04-07 NOTE — HISTORY OF PRESENT ILLNESS
[FreeTextEntry1] : Shila Coley is a 57 year old female with history of Hypertension, hypercholesterolemia, Diabetes, Hypothyroidism, chronic kidney disease and Pulmonary hypertension comes for cardiac evaluation. Admitted to [facility] in 12/2022 for Myxedema coma and was treated. In 2/2023 was admitted to [facility] again with marked bradycardia , HR in 20' s secondary to Labetalol and improved. Had right heart cath which showed moderate pulmonary hypertension. Follows up with Pulmonary. Denies any chest pain or palpitations. On and off bilateral edema of feet . Mild shortness of breath on exertion which is improved with rest and chronic. Follows up with Nephrologist for CKD. Came with daughter. Recently seen by another Cardiologist where she had Echo.

## 2023-04-07 NOTE — REVIEW OF SYSTEMS
[Feeling Fatigued] : feeling fatigued [Dyspnea on exertion] : dyspnea during exertion [Lower Ext Edema] : lower extremity edema [Joint Pain] : joint pain [Negative] : Respiratory [Fever] : no fever [Headache] : no headache [Chills] : no chills [Blurry Vision] : no blurred vision [Chest Discomfort] : no chest discomfort [Palpitations] : no palpitations [Orthopnea] : no orthopnea [PND] : no PND [Abdominal Pain] : no abdominal pain [Nausea] : no nausea [Vomiting] : no vomiting [Heartburn] : no heartburn [Rash] : no rash [Itching] : no itching [Dizziness] : no dizziness [Tremor] : no tremor was seen [Numbness (Hypoesthesia)] : no numbness [Tingling (Paresthesia)] : no tingling [Confusion] : no confusion was observed [Anxiety] : no anxiety [Under Stress] : not under stress [Easy Bleeding] : no tendency for easy bleeding [Easy Bruising] : no tendency for easy bruising

## 2023-04-18 ENCOUNTER — APPOINTMENT (OUTPATIENT)
Dept: ENDOCRINOLOGY | Facility: CLINIC | Age: 58
End: 2023-04-18

## 2023-04-24 ENCOUNTER — INPATIENT (INPATIENT)
Facility: HOSPITAL | Age: 58
LOS: 1 days | Discharge: HOME | End: 2023-04-26
Attending: STUDENT IN AN ORGANIZED HEALTH CARE EDUCATION/TRAINING PROGRAM | Admitting: STUDENT IN AN ORGANIZED HEALTH CARE EDUCATION/TRAINING PROGRAM
Payer: COMMERCIAL

## 2023-04-24 VITALS
DIASTOLIC BLOOD PRESSURE: 72 MMHG | HEIGHT: 62 IN | HEART RATE: 66 BPM | OXYGEN SATURATION: 100 % | RESPIRATION RATE: 16 BRPM | SYSTOLIC BLOOD PRESSURE: 168 MMHG

## 2023-04-24 DIAGNOSIS — D64.9 ANEMIA, UNSPECIFIED: ICD-10-CM

## 2023-04-24 LAB
ALBUMIN SERPL ELPH-MCNC: 3.6 G/DL — SIGNIFICANT CHANGE UP (ref 3.3–5)
ALP SERPL-CCNC: 612 U/L — HIGH (ref 40–120)
ALT FLD-CCNC: 44 U/L — HIGH (ref 4–33)
ANION GAP SERPL CALC-SCNC: 13 MMOL/L — SIGNIFICANT CHANGE UP (ref 7–14)
APTT BLD: 44.4 SEC — HIGH (ref 27–36.3)
AST SERPL-CCNC: 34 U/L — HIGH (ref 4–32)
B-OH-BUTYR SERPL-SCNC: <0 MMOL/L — SIGNIFICANT CHANGE UP (ref 0–0.4)
BASOPHILS # BLD AUTO: 0.02 K/UL — SIGNIFICANT CHANGE UP (ref 0–0.2)
BASOPHILS NFR BLD AUTO: 0.4 % — SIGNIFICANT CHANGE UP (ref 0–2)
BILIRUB SERPL-MCNC: 0.3 MG/DL — SIGNIFICANT CHANGE UP (ref 0.2–1.2)
BLD GP AB SCN SERPL QL: NEGATIVE — SIGNIFICANT CHANGE UP
BLOOD GAS VENOUS COMPREHENSIVE RESULT: SIGNIFICANT CHANGE UP
BUN SERPL-MCNC: 67 MG/DL — HIGH (ref 7–23)
CALCIUM SERPL-MCNC: 9.3 MG/DL — SIGNIFICANT CHANGE UP (ref 8.4–10.5)
CHLORIDE SERPL-SCNC: 99 MMOL/L — SIGNIFICANT CHANGE UP (ref 98–107)
CO2 SERPL-SCNC: 21 MMOL/L — LOW (ref 22–31)
CREAT SERPL-MCNC: 1.98 MG/DL — HIGH (ref 0.5–1.3)
EGFR: 29 ML/MIN/1.73M2 — LOW
EOSINOPHIL # BLD AUTO: 0.12 K/UL — SIGNIFICANT CHANGE UP (ref 0–0.5)
EOSINOPHIL NFR BLD AUTO: 2.1 % — SIGNIFICANT CHANGE UP (ref 0–6)
FERRITIN SERPL-MCNC: 820 NG/ML — HIGH (ref 15–150)
GLUCOSE SERPL-MCNC: 444 MG/DL — HIGH (ref 70–99)
HCT VFR BLD CALC: 20.2 % — CRITICAL LOW (ref 34.5–45)
HCT VFR BLD CALC: 21.6 % — LOW (ref 34.5–45)
HGB BLD-MCNC: 6.3 G/DL — CRITICAL LOW (ref 11.5–15.5)
HGB BLD-MCNC: 6.8 G/DL — CRITICAL LOW (ref 11.5–15.5)
IANC: 4.54 K/UL — SIGNIFICANT CHANGE UP (ref 1.8–7.4)
IMM GRANULOCYTES NFR BLD AUTO: 0.9 % — SIGNIFICANT CHANGE UP (ref 0–0.9)
INR BLD: 1.21 RATIO — HIGH (ref 0.88–1.16)
IRON SATN MFR SERPL: 17 % — SIGNIFICANT CHANGE UP (ref 14–50)
IRON SATN MFR SERPL: 50 UG/DL — SIGNIFICANT CHANGE UP (ref 30–160)
LYMPHOCYTES # BLD AUTO: 0.62 K/UL — LOW (ref 1–3.3)
LYMPHOCYTES # BLD AUTO: 10.9 % — LOW (ref 13–44)
MCHC RBC-ENTMCNC: 25.1 PG — LOW (ref 27–34)
MCHC RBC-ENTMCNC: 25.8 PG — LOW (ref 27–34)
MCHC RBC-ENTMCNC: 31.2 GM/DL — LOW (ref 32–36)
MCHC RBC-ENTMCNC: 31.5 GM/DL — LOW (ref 32–36)
MCV RBC AUTO: 80.5 FL — SIGNIFICANT CHANGE UP (ref 80–100)
MCV RBC AUTO: 81.8 FL — SIGNIFICANT CHANGE UP (ref 80–100)
MONOCYTES # BLD AUTO: 0.33 K/UL — SIGNIFICANT CHANGE UP (ref 0–0.9)
MONOCYTES NFR BLD AUTO: 5.8 % — SIGNIFICANT CHANGE UP (ref 2–14)
NEUTROPHILS # BLD AUTO: 4.54 K/UL — SIGNIFICANT CHANGE UP (ref 1.8–7.4)
NEUTROPHILS NFR BLD AUTO: 79.9 % — HIGH (ref 43–77)
NRBC # BLD: 0 /100 WBCS — SIGNIFICANT CHANGE UP (ref 0–0)
NRBC # BLD: 0 /100 WBCS — SIGNIFICANT CHANGE UP (ref 0–0)
NRBC # FLD: 0 K/UL — SIGNIFICANT CHANGE UP (ref 0–0)
NRBC # FLD: 0 K/UL — SIGNIFICANT CHANGE UP (ref 0–0)
PLATELET # BLD AUTO: 224 K/UL — SIGNIFICANT CHANGE UP (ref 150–400)
PLATELET # BLD AUTO: 257 K/UL — SIGNIFICANT CHANGE UP (ref 150–400)
POTASSIUM SERPL-MCNC: 5 MMOL/L — SIGNIFICANT CHANGE UP (ref 3.5–5.3)
POTASSIUM SERPL-SCNC: 5 MMOL/L — SIGNIFICANT CHANGE UP (ref 3.5–5.3)
PROT SERPL-MCNC: 6.8 G/DL — SIGNIFICANT CHANGE UP (ref 6–8.3)
PROTHROM AB SERPL-ACNC: 14.1 SEC — HIGH (ref 10.5–13.4)
RBC # BLD: 2.51 M/UL — LOW (ref 3.8–5.2)
RBC # BLD: 2.64 M/UL — LOW (ref 3.8–5.2)
RBC # FLD: 14.8 % — HIGH (ref 10.3–14.5)
RBC # FLD: 15.1 % — HIGH (ref 10.3–14.5)
RH IG SCN BLD-IMP: POSITIVE — SIGNIFICANT CHANGE UP
SODIUM SERPL-SCNC: 133 MMOL/L — LOW (ref 135–145)
TIBC SERPL-MCNC: 296 UG/DL — SIGNIFICANT CHANGE UP (ref 220–430)
UIBC SERPL-MCNC: 246 UG/DL — SIGNIFICANT CHANGE UP (ref 110–370)
WBC # BLD: 5.4 K/UL — SIGNIFICANT CHANGE UP (ref 3.8–10.5)
WBC # BLD: 5.68 K/UL — SIGNIFICANT CHANGE UP (ref 3.8–10.5)
WBC # FLD AUTO: 5.4 K/UL — SIGNIFICANT CHANGE UP (ref 3.8–10.5)
WBC # FLD AUTO: 5.68 K/UL — SIGNIFICANT CHANGE UP (ref 3.8–10.5)

## 2023-04-24 PROCEDURE — 99285 EMERGENCY DEPT VISIT HI MDM: CPT

## 2023-04-24 PROCEDURE — 71046 X-RAY EXAM CHEST 2 VIEWS: CPT | Mod: 26

## 2023-04-24 RX ORDER — SODIUM CHLORIDE 9 MG/ML
1000 INJECTION INTRAMUSCULAR; INTRAVENOUS; SUBCUTANEOUS ONCE
Refills: 0 | Status: DISCONTINUED | OUTPATIENT
Start: 2023-04-24 | End: 2023-04-24

## 2023-04-24 RX ORDER — INSULIN LISPRO 100/ML
4 VIAL (ML) SUBCUTANEOUS ONCE
Refills: 0 | Status: COMPLETED | OUTPATIENT
Start: 2023-04-24 | End: 2023-04-24

## 2023-04-24 RX ADMIN — Medication 4 UNIT(S): at 19:13

## 2023-04-24 NOTE — ED PROVIDER NOTE - PROGRESS NOTE DETAILS
apryl PARKS PGY-1: Patient feels well.  Beta negative.  No anion gap.  Low concern for DKA at this time.  We will treat elevated blood glucose with lispro and repeat blood gas.   Patient hemoglobin 6.8, consent signed, blood to be hung.  CDU for GI.

## 2023-04-24 NOTE — ED ADULT NURSE NOTE - OBJECTIVE STATEMENT
Pt presents to the ED with complaints of low H&H. At triage pt hyperglycemic. Pt rpeorts she was seen by PCP last week and told she is anemic. Pt reports history of previous transfusions, and iron infusion. PMH of HTN, DM, and hypothyroidism. Pt alert and oriented x4. Pt ambulating. Pt denies chest pain, headache. Pt does endorse dyspnea with exertion. Pulse ox- 97% on room air. 20g IV placed in LAC> Labs collected. Support ongoing. Shane

## 2023-04-24 NOTE — ED ADULT NURSE NOTE - CHIEF COMPLAINT QUOTE
Pt blood work done 1 week ago at PCP office. Pt was called by MD today to inform pt that Hgb 6.5. Pt c/o chills, lower ext weakness. Denies any dizziness or SOB this past week. Unable to obtain oral temp in triage, attempted several times. PMHX HTN, DM2, anemia. TZ=620

## 2023-04-24 NOTE — ED PROVIDER NOTE - ATTENDING CONTRIBUTION TO CARE
The patient is a 57y Female who has a past medical and surgery history of HTN HLD DM Hypothyroid Pulm HTN CKD PTED b/c blood work done 1 week ago at PCP office. Pt was called by MD today to inform pt that Hgb 6.5. Pt c/o chills, lower ext weakness. Patient with MARKS     Vital Signs Last 24 Hrs  HR: 66 BP: 168/72 RR: 16 SpO2: 100% (24 Apr 2023 16:02)   PE: as described; my additions and exceptions are noted in the chart    DATA:  EKG: ; Pending   LAB: Pending at time of evaluation    IMPRESSION/RISK:  Dx=Anemia     Consideration include Probable combo AOCD + melena dark stools   Plan  repeat CBC x 2 coags type and screen if PT to be admitted for EGD by GI will obtain EKG

## 2023-04-24 NOTE — ED ADULT NURSE REASSESSMENT NOTE - NS ED NURSE REASSESS COMMENT FT1
Writer received another voice message from patient who stated she would like to attend [facility] for psychiatry & psychotherapy.  She called them, but they did not answer.  She left a voice message requesting to make an appointment as a new patient.  She will call this writer back if she encounters any difficulty making the appointment.  Writer will check in with her next week to confirm appointment was made.      Assumed care of pt from DANIEL Peter. Pt A&Ox4, respirations even/unlabored on room air. Repeat labs obtained, labeled in front of pt and sent, Pt denies having any c/o pain or discomfort. All safety precautions maintained. Awaiting further disposition.

## 2023-04-24 NOTE — ED PROVIDER NOTE - OBJECTIVE STATEMENT
HPI & ROS: 57-year-old female history of hypothyroidism ,myxedema, type 2 diabetes,  hypertension, cardiac failure, presenting with hyperglycemia.  Also presenting with anemia from doctor's office.  Lab work last week showed today a hemoglobin of 6.5.  This is  similar to her labs resulting in hemoglobins around 7.  Patient has had multiple transfusions and iron infusions with iron p.o. in the past.  Patient without chest pain. No headache.  No blurry vision.  Patient states she is slightly more dyspneic when walking normal distances for her.  patient with some swelling in her legs, is getting better.  Of note recently admitted with hypothermia in the 90s and bradycardia down to the 20s with concern for myxedema coma.    PCP: perez omalley    Exam as stated below:   CONSTITUTIONAL: In NAD.   SKIN: Warm dry. No rashes noted.   HEAD: NCAT.   EYES: No scleral icterus. Conjunctiva pink.  ENT: Posterior pharynx with no erythema.  NECK: No ttp.    CARD: RRR. No murmurs.  RESP: Clear to ausculation b/l. No Crackles noted. No Wheezing noted.  ABD: Soft. Non-tender. Not distended.   MSK: No pedal edema. No calf tenderness.  NEURO: UE/LE grossly intact. Motor UE/LE sensation grossly intact. CN II-XII grossly intact.   PSYCH: Cooperative, appropriate. HPI & ROS: 57-year-old female history of hypothyroidism ,myxedema, type 2 diabetes,  hypertension, cardiac failure, presenting with hyperglycemia.  Also presenting with anemia from doctor's office.  Lab work last week showed today a hemoglobin of 6.5.  This is  similar to her labs resulting in hemoglobins around 7.  Patient has had multiple transfusions and iron infusions with iron p.o. in the past.  Patient without chest pain. No headache.  No blurry vision.  Patient states she is slightly more dyspneic when walking normal distances for her.  patient with some swelling in her legs, is getting better.  patient with some melenic stools recently.  No history of colonoscopy.  Was scheduled to get 1 but was in hospital.  Of note recently admitted with hypothermia in the 90s and bradycardia down to the 20s with concern for myxedema coma.     PCP: perez omalley    Exam as stated below:   CONSTITUTIONAL: In NAD.   SKIN: Warm dry. No rashes noted.   HEAD: NCAT.   EYES: No scleral icterus. Conjunctiva pink.  ENT: Posterior pharynx with no erythema.  NECK: No ttp.    CARD: RRR. No murmurs.  RESP: Clear to ausculation b/l. No Crackles noted. No Wheezing noted.  ABD: Soft. Non-tender. Not distended.   MSK: No pedal edema. No calf tenderness.  NEURO: UE/LE grossly intact. Motor UE/LE sensation grossly intact. CN II-XII grossly intact.   PSYCH: Cooperative, appropriate.

## 2023-04-24 NOTE — ED PROVIDER NOTE - CLINICAL SUMMARY MEDICAL DECISION MAKING FREE TEXT BOX
MDM & Summary:  57-year-old female history of hypothyroidism, type 2 diabetes, HF, CKD,, presenting with hemoglobin of 6.5 with dyspnea on exertion.  vital signs here showing not bradycardic.  Sinus rhythm. .   No abdominal pain.  No urinary pain, no chest pain.  No problems defecating.  No fevers at home.  Not hypothermic here  DDx:  anemia possibly in the setting of renal failure  versus anemia of chronic disease, less likely bleeding though does state that she is having darker stools than normal but only once.  Likely not myxedema coma as no swelling, not bradycardia, not hypothermic.  Vital stable.  Less likely infection such as  pneumonia or UTI,  Exam not consistent with.  Labs:  CBC CMP VBG PT PTT coag, COVID, beta  Imaging:  chest x-ray, twelve-lead unremarkable  Tx:  patient not fluid overloaded at this time, as patient was fluid overloaded on last admission, will be careful dosing fluids  Consults/Resources:  less likely  Dispo:  likely admit    Triage note reviewed. VS reviewed. EKG reviewed and documented in "RESULTS" section, if possible at given time.     DDx in MDM includes the most likely ddx, but is not limited to solely what is listed. Progress notes written as needed, and are included in "PROGRESS NOTE" section below.       Medical, family, and social determinants of health reviewed and discussed w/ pt/family/caretaker, when allowable, and is incorporated into note above, whenever possible.

## 2023-04-24 NOTE — ED ADULT TRIAGE NOTE - CHIEF COMPLAINT QUOTE
Pt blood work done 1 week ago at PCP office. Pt was called by MD today to inform pt that Hgb 6.5. Pt c/o chills, lower ext weakness. Denies any dizziness or SOB this past week. Unable to obtain oral temp in triage, attempted several times. PMHX HTN, DM2, anemia. [id number]

## 2023-04-25 DIAGNOSIS — Z29.9 ENCOUNTER FOR PROPHYLACTIC MEASURES, UNSPECIFIED: ICD-10-CM

## 2023-04-25 DIAGNOSIS — Z98.49 CATARACT EXTRACTION STATUS, UNSPECIFIED EYE: Chronic | ICD-10-CM

## 2023-04-25 DIAGNOSIS — I50.32 CHRONIC DIASTOLIC (CONGESTIVE) HEART FAILURE: ICD-10-CM

## 2023-04-25 DIAGNOSIS — E03.9 HYPOTHYROIDISM, UNSPECIFIED: ICD-10-CM

## 2023-04-25 DIAGNOSIS — T68.XXXA HYPOTHERMIA, INITIAL ENCOUNTER: ICD-10-CM

## 2023-04-25 DIAGNOSIS — R53.83 OTHER FATIGUE: ICD-10-CM

## 2023-04-25 DIAGNOSIS — R74.01 ELEVATION OF LEVELS OF LIVER TRANSAMINASE LEVELS: ICD-10-CM

## 2023-04-25 DIAGNOSIS — I10 ESSENTIAL (PRIMARY) HYPERTENSION: ICD-10-CM

## 2023-04-25 DIAGNOSIS — E11.65 TYPE 2 DIABETES MELLITUS WITH HYPERGLYCEMIA: ICD-10-CM

## 2023-04-25 DIAGNOSIS — E87.29 OTHER ACIDOSIS: ICD-10-CM

## 2023-04-25 DIAGNOSIS — D64.9 ANEMIA, UNSPECIFIED: ICD-10-CM

## 2023-04-25 LAB
ALBUMIN SERPL ELPH-MCNC: 3.3 G/DL — SIGNIFICANT CHANGE UP (ref 3.3–5)
ALP SERPL-CCNC: 551 U/L — HIGH (ref 40–120)
ALT FLD-CCNC: 38 U/L — HIGH (ref 4–33)
ANION GAP SERPL CALC-SCNC: 12 MMOL/L — SIGNIFICANT CHANGE UP (ref 7–14)
AST SERPL-CCNC: 33 U/L — HIGH (ref 4–32)
BASE EXCESS BLDV CALC-SCNC: -0.4 MMOL/L — SIGNIFICANT CHANGE UP (ref -2–3)
BASOPHILS # BLD AUTO: 0.02 K/UL — SIGNIFICANT CHANGE UP (ref 0–0.2)
BASOPHILS # BLD AUTO: 0.02 K/UL — SIGNIFICANT CHANGE UP (ref 0–0.2)
BASOPHILS NFR BLD AUTO: 0.4 % — SIGNIFICANT CHANGE UP (ref 0–2)
BASOPHILS NFR BLD AUTO: 0.4 % — SIGNIFICANT CHANGE UP (ref 0–2)
BILIRUB SERPL-MCNC: 0.6 MG/DL — SIGNIFICANT CHANGE UP (ref 0.2–1.2)
BUN SERPL-MCNC: 64 MG/DL — HIGH (ref 7–23)
CALCIUM SERPL-MCNC: 9.3 MG/DL — SIGNIFICANT CHANGE UP (ref 8.4–10.5)
CHLORIDE SERPL-SCNC: 102 MMOL/L — SIGNIFICANT CHANGE UP (ref 98–107)
CO2 BLDV-SCNC: 26.1 MMOL/L — HIGH (ref 22–26)
CO2 SERPL-SCNC: 23 MMOL/L — SIGNIFICANT CHANGE UP (ref 22–31)
CREAT SERPL-MCNC: 1.92 MG/DL — HIGH (ref 0.5–1.3)
EGFR: 30 ML/MIN/1.73M2 — LOW
EOSINOPHIL # BLD AUTO: 0.1 K/UL — SIGNIFICANT CHANGE UP (ref 0–0.5)
EOSINOPHIL # BLD AUTO: 0.12 K/UL — SIGNIFICANT CHANGE UP (ref 0–0.5)
EOSINOPHIL NFR BLD AUTO: 2 % — SIGNIFICANT CHANGE UP (ref 0–6)
EOSINOPHIL NFR BLD AUTO: 2.5 % — SIGNIFICANT CHANGE UP (ref 0–6)
GLUCOSE BLDC GLUCOMTR-MCNC: 101 MG/DL — HIGH (ref 70–99)
GLUCOSE BLDC GLUCOMTR-MCNC: 150 MG/DL — HIGH (ref 70–99)
GLUCOSE BLDC GLUCOMTR-MCNC: 199 MG/DL — HIGH (ref 70–99)
GLUCOSE BLDC GLUCOMTR-MCNC: 201 MG/DL — HIGH (ref 70–99)
GLUCOSE BLDC GLUCOMTR-MCNC: 230 MG/DL — HIGH (ref 70–99)
GLUCOSE BLDC GLUCOMTR-MCNC: 85 MG/DL — SIGNIFICANT CHANGE UP (ref 70–99)
GLUCOSE SERPL-MCNC: 85 MG/DL — SIGNIFICANT CHANGE UP (ref 70–99)
HCO3 BLDV-SCNC: 25 MMOL/L — SIGNIFICANT CHANGE UP (ref 22–29)
HCT VFR BLD CALC: 22 % — LOW (ref 34.5–45)
HCT VFR BLD CALC: 22.2 % — LOW (ref 34.5–45)
HCT VFR BLD CALC: 22.5 % — LOW (ref 34.5–45)
HGB BLD-MCNC: 7.1 G/DL — LOW (ref 11.5–15.5)
HGB BLD-MCNC: 7.1 G/DL — LOW (ref 11.5–15.5)
HGB BLD-MCNC: 7.2 G/DL — LOW (ref 11.5–15.5)
IANC: 3.56 K/UL — SIGNIFICANT CHANGE UP (ref 1.8–7.4)
IANC: 3.65 K/UL — SIGNIFICANT CHANGE UP (ref 1.8–7.4)
IMM GRANULOCYTES NFR BLD AUTO: 0.2 % — SIGNIFICANT CHANGE UP (ref 0–0.9)
IMM GRANULOCYTES NFR BLD AUTO: 0.4 % — SIGNIFICANT CHANGE UP (ref 0–0.9)
LYMPHOCYTES # BLD AUTO: 0.65 K/UL — LOW (ref 1–3.3)
LYMPHOCYTES # BLD AUTO: 0.74 K/UL — LOW (ref 1–3.3)
LYMPHOCYTES # BLD AUTO: 13.7 % — SIGNIFICANT CHANGE UP (ref 13–44)
LYMPHOCYTES # BLD AUTO: 15.1 % — SIGNIFICANT CHANGE UP (ref 13–44)
MAGNESIUM SERPL-MCNC: 1.7 MG/DL — SIGNIFICANT CHANGE UP (ref 1.6–2.6)
MCHC RBC-ENTMCNC: 24.9 PG — LOW (ref 27–34)
MCHC RBC-ENTMCNC: 25.4 PG — LOW (ref 27–34)
MCHC RBC-ENTMCNC: 25.6 PG — LOW (ref 27–34)
MCHC RBC-ENTMCNC: 32 GM/DL — SIGNIFICANT CHANGE UP (ref 32–36)
MCHC RBC-ENTMCNC: 32 GM/DL — SIGNIFICANT CHANGE UP (ref 32–36)
MCHC RBC-ENTMCNC: 32.3 GM/DL — SIGNIFICANT CHANGE UP (ref 32–36)
MCV RBC AUTO: 77.9 FL — LOW (ref 80–100)
MCV RBC AUTO: 78.9 FL — LOW (ref 80–100)
MCV RBC AUTO: 80.1 FL — SIGNIFICANT CHANGE UP (ref 80–100)
MONOCYTES # BLD AUTO: 0.37 K/UL — SIGNIFICANT CHANGE UP (ref 0–0.9)
MONOCYTES # BLD AUTO: 0.38 K/UL — SIGNIFICANT CHANGE UP (ref 0–0.9)
MONOCYTES NFR BLD AUTO: 7.6 % — SIGNIFICANT CHANGE UP (ref 2–14)
MONOCYTES NFR BLD AUTO: 8 % — SIGNIFICANT CHANGE UP (ref 2–14)
NEUTROPHILS # BLD AUTO: 3.56 K/UL — SIGNIFICANT CHANGE UP (ref 1.8–7.4)
NEUTROPHILS # BLD AUTO: 3.65 K/UL — SIGNIFICANT CHANGE UP (ref 1.8–7.4)
NEUTROPHILS NFR BLD AUTO: 74.7 % — SIGNIFICANT CHANGE UP (ref 43–77)
NEUTROPHILS NFR BLD AUTO: 75 % — SIGNIFICANT CHANGE UP (ref 43–77)
NRBC # BLD: 0 /100 WBCS — SIGNIFICANT CHANGE UP (ref 0–0)
NRBC # FLD: 0 K/UL — SIGNIFICANT CHANGE UP (ref 0–0)
PCO2 BLDV: 42 MMHG — SIGNIFICANT CHANGE UP (ref 39–52)
PH BLDV: 7.38 — SIGNIFICANT CHANGE UP (ref 7.32–7.43)
PHOSPHATE SERPL-MCNC: 4.6 MG/DL — HIGH (ref 2.5–4.5)
PLATELET # BLD AUTO: 203 K/UL — SIGNIFICANT CHANGE UP (ref 150–400)
PLATELET # BLD AUTO: 212 K/UL — SIGNIFICANT CHANGE UP (ref 150–400)
PLATELET # BLD AUTO: 221 K/UL — SIGNIFICANT CHANGE UP (ref 150–400)
PO2 BLDV: 70 MMHG — HIGH (ref 25–45)
POTASSIUM SERPL-MCNC: 4.5 MMOL/L — SIGNIFICANT CHANGE UP (ref 3.5–5.3)
POTASSIUM SERPL-SCNC: 4.5 MMOL/L — SIGNIFICANT CHANGE UP (ref 3.5–5.3)
PROT SERPL-MCNC: 6.6 G/DL — SIGNIFICANT CHANGE UP (ref 6–8.3)
RBC # BLD: 2.77 M/UL — LOW (ref 3.8–5.2)
RBC # BLD: 2.79 M/UL — LOW (ref 3.8–5.2)
RBC # BLD: 2.89 M/UL — LOW (ref 3.8–5.2)
RBC # FLD: 15.2 % — HIGH (ref 10.3–14.5)
RBC # FLD: 15.5 % — HIGH (ref 10.3–14.5)
RBC # FLD: 15.5 % — HIGH (ref 10.3–14.5)
RETICS #: 81.1 K/UL — SIGNIFICANT CHANGE UP (ref 25–125)
RETICS/RBC NFR: 3.1 % — HIGH (ref 0.5–2.5)
SAO2 % BLDV: 95.6 % — HIGH (ref 67–88)
SODIUM SERPL-SCNC: 137 MMOL/L — SIGNIFICANT CHANGE UP (ref 135–145)
T3 SERPL-MCNC: 71 NG/DL — LOW (ref 80–200)
T4 FREE SERPL-MCNC: 1.8 NG/DL — SIGNIFICANT CHANGE UP (ref 0.9–1.8)
TSH SERPL-MCNC: 0.4 UIU/ML — SIGNIFICANT CHANGE UP (ref 0.27–4.2)
WBC # BLD: 4.75 K/UL — SIGNIFICANT CHANGE UP (ref 3.8–10.5)
WBC # BLD: 4.84 K/UL — SIGNIFICANT CHANGE UP (ref 3.8–10.5)
WBC # BLD: 4.89 K/UL — SIGNIFICANT CHANGE UP (ref 3.8–10.5)
WBC # FLD AUTO: 4.75 K/UL — SIGNIFICANT CHANGE UP (ref 3.8–10.5)
WBC # FLD AUTO: 4.84 K/UL — SIGNIFICANT CHANGE UP (ref 3.8–10.5)
WBC # FLD AUTO: 4.89 K/UL — SIGNIFICANT CHANGE UP (ref 3.8–10.5)

## 2023-04-25 PROCEDURE — 99223 1ST HOSP IP/OBS HIGH 75: CPT

## 2023-04-25 PROCEDURE — 99221 1ST HOSP IP/OBS SF/LOW 40: CPT | Mod: GC

## 2023-04-25 PROCEDURE — 12345: CPT | Mod: NC,GC

## 2023-04-25 RX ORDER — GLUCAGON INJECTION, SOLUTION 0.5 MG/.1ML
1 INJECTION, SOLUTION SUBCUTANEOUS ONCE
Refills: 0 | Status: DISCONTINUED | OUTPATIENT
Start: 2023-04-25 | End: 2023-04-26

## 2023-04-25 RX ORDER — SODIUM CHLORIDE 9 MG/ML
1000 INJECTION, SOLUTION INTRAVENOUS
Refills: 0 | Status: DISCONTINUED | OUTPATIENT
Start: 2023-04-25 | End: 2023-04-26

## 2023-04-25 RX ORDER — LEVOTHYROXINE SODIUM 125 MCG
125 TABLET ORAL DAILY
Refills: 0 | Status: DISCONTINUED | OUTPATIENT
Start: 2023-04-25 | End: 2023-04-26

## 2023-04-25 RX ORDER — INSULIN LISPRO 100/ML
VIAL (ML) SUBCUTANEOUS
Refills: 0 | Status: DISCONTINUED | OUTPATIENT
Start: 2023-04-25 | End: 2023-04-26

## 2023-04-25 RX ORDER — ONDANSETRON 8 MG/1
4 TABLET, FILM COATED ORAL EVERY 8 HOURS
Refills: 0 | Status: DISCONTINUED | OUTPATIENT
Start: 2023-04-25 | End: 2023-04-26

## 2023-04-25 RX ORDER — LANOLIN ALCOHOL/MO/W.PET/CERES
3 CREAM (GRAM) TOPICAL AT BEDTIME
Refills: 0 | Status: DISCONTINUED | OUTPATIENT
Start: 2023-04-25 | End: 2023-04-26

## 2023-04-25 RX ORDER — HYDRALAZINE HCL 50 MG
50 TABLET ORAL EVERY 8 HOURS
Refills: 0 | Status: DISCONTINUED | OUTPATIENT
Start: 2023-04-25 | End: 2023-04-26

## 2023-04-25 RX ORDER — INSULIN LISPRO 100/ML
VIAL (ML) SUBCUTANEOUS AT BEDTIME
Refills: 0 | Status: DISCONTINUED | OUTPATIENT
Start: 2023-04-25 | End: 2023-04-26

## 2023-04-25 RX ORDER — DEXTROSE 50 % IN WATER 50 %
15 SYRINGE (ML) INTRAVENOUS ONCE
Refills: 0 | Status: DISCONTINUED | OUTPATIENT
Start: 2023-04-25 | End: 2023-04-26

## 2023-04-25 RX ORDER — DEXTROSE 50 % IN WATER 50 %
25 SYRINGE (ML) INTRAVENOUS ONCE
Refills: 0 | Status: DISCONTINUED | OUTPATIENT
Start: 2023-04-25 | End: 2023-04-26

## 2023-04-25 RX ORDER — BUMETANIDE 0.25 MG/ML
1 INJECTION INTRAMUSCULAR; INTRAVENOUS
Refills: 0 | Status: DISCONTINUED | OUTPATIENT
Start: 2023-04-25 | End: 2023-04-26

## 2023-04-25 RX ORDER — INSULIN LISPRO 100/ML
VIAL (ML) SUBCUTANEOUS EVERY 6 HOURS
Refills: 0 | Status: DISCONTINUED | OUTPATIENT
Start: 2023-04-25 | End: 2023-04-25

## 2023-04-25 RX ORDER — PANTOPRAZOLE SODIUM 20 MG/1
80 TABLET, DELAYED RELEASE ORAL ONCE
Refills: 0 | Status: COMPLETED | OUTPATIENT
Start: 2023-04-25 | End: 2023-04-25

## 2023-04-25 RX ORDER — PANTOPRAZOLE SODIUM 20 MG/1
40 TABLET, DELAYED RELEASE ORAL
Refills: 0 | Status: DISCONTINUED | OUTPATIENT
Start: 2023-04-25 | End: 2023-04-26

## 2023-04-25 RX ORDER — ATORVASTATIN CALCIUM 80 MG/1
40 TABLET, FILM COATED ORAL AT BEDTIME
Refills: 0 | Status: DISCONTINUED | OUTPATIENT
Start: 2023-04-25 | End: 2023-04-26

## 2023-04-25 RX ORDER — PANTOPRAZOLE SODIUM 20 MG/1
TABLET, DELAYED RELEASE ORAL
Refills: 0 | Status: DISCONTINUED | OUTPATIENT
Start: 2023-04-25 | End: 2023-04-26

## 2023-04-25 RX ORDER — DEXTROSE 50 % IN WATER 50 %
12.5 SYRINGE (ML) INTRAVENOUS ONCE
Refills: 0 | Status: DISCONTINUED | OUTPATIENT
Start: 2023-04-25 | End: 2023-04-26

## 2023-04-25 RX ORDER — ACETAMINOPHEN 500 MG
650 TABLET ORAL EVERY 6 HOURS
Refills: 0 | Status: DISCONTINUED | OUTPATIENT
Start: 2023-04-25 | End: 2023-04-26

## 2023-04-25 RX ADMIN — PANTOPRAZOLE SODIUM 80 MILLIGRAM(S): 20 TABLET, DELAYED RELEASE ORAL at 07:10

## 2023-04-25 RX ADMIN — Medication 50 MILLIGRAM(S): at 07:09

## 2023-04-25 RX ADMIN — PANTOPRAZOLE SODIUM 40 MILLIGRAM(S): 20 TABLET, DELAYED RELEASE ORAL at 18:09

## 2023-04-25 RX ADMIN — BUMETANIDE 1 MILLIGRAM(S): 0.25 INJECTION INTRAMUSCULAR; INTRAVENOUS at 15:14

## 2023-04-25 RX ADMIN — Medication 125 MICROGRAM(S): at 07:10

## 2023-04-25 RX ADMIN — Medication 1: at 18:55

## 2023-04-25 RX ADMIN — Medication 50 MILLIGRAM(S): at 15:14

## 2023-04-25 RX ADMIN — BUMETANIDE 1 MILLIGRAM(S): 0.25 INJECTION INTRAMUSCULAR; INTRAVENOUS at 07:10

## 2023-04-25 RX ADMIN — ATORVASTATIN CALCIUM 40 MILLIGRAM(S): 80 TABLET, FILM COATED ORAL at 22:21

## 2023-04-25 RX ADMIN — Medication 50 MILLIGRAM(S): at 22:21
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
## 2023-04-25 NOTE — PROVIDER CONTACT NOTE (OTHER) - ACTION/TREATMENT ORDERED:
As per ACP blood warmer not indicated, draw 0300 CBC and hold off on next blood transfusion until CBC results

## 2023-04-25 NOTE — H&P ADULT - PROBLEM SELECTOR PLAN 2
- Suspect multifactorial from her anemia, recent myxedema coma, recent admissions  - Improving at home  - Obtain PT melanieal

## 2023-04-25 NOTE — PROGRESS NOTE ADULT - ATTENDING COMMENTS
acute on chronic anemia - s/p 1 unit PRBC w arjun response   previously had been attributed to multifactorial etiologies w CKD, marrow suppression, AOCD but now on this admission reports melena   arjun GI, possible scope rustam pulm input prior considering PAH piror to scope  in regards to episodic hypothermia and bradycardia appears to be chronic issue w unremarkable work up based on review of prior records   CKD 3 at baseline, transaminitis chronic w neg work up and sono w hepatic steatosis  hfpef - compensated, cw home Bumex  uncontrolled DM2 with known labile FS and recurrent hypoglycemia - last admission taken off Tresiba and sent home on Januvia and Prandin pre-meal - FS better after ED visit and insulin - for now on NISS - considering will be npo for scope, will keep on NISS and monitor for now - if FS labile, will have endo input   cw synthroid - check TSH acute on chronic anemia - s/p 1 unit PRBC w arjun response   previously had been attributed to multifactorial etiologies w CKD, marrow suppression, AOCD but now on this admission reports melena   arjun GI, possible scope rustam pulm input prior considering PAH piror to scope  in regards to episodic hypothermia and bradycardia appears to be chronic issue w unremarkable work up based on review of prior records   CKD 3 at baseline, transaminitis chronic w neg work up and sono w hepatic steatosis  hfpef - compensated, cw home Bumex  uncontrolled DM2 with known labile FS and recurrent hypoglycemia - last admission taken off Tresiba and sent home on Januvia and Prandin pre-meal - FS better after ED visit and insulin - for now on NISS - considering will be npo for scope, will keep on NISS and monitor for now - if FS labile, will have endo input   cw synthroid - check TSH      addendum: GI note amened to now no plans for scope - keep inpt today to assure no further melena and counts remain stable before dc planning

## 2023-04-25 NOTE — H&P ADULT - PROBLEM SELECTOR PLAN 5
- Seems to have subacute transaminitis, stable/slowly improving, last US RUQ with hepatic statosis  - Would recommend continued outpatient follow up for her transaminitis - Poorly controlled DM2, patient recently had her long acting insulin stopped on her recent admission due to hypoglycemia, patient and family also report her having issues with hypoglycemia at home though FS seem to be variable (pt states FS range from 80s to 300s in AM)  - Patient reports poor dietary compliance which likely is playing some role in her hyperglycemia  - Monitor FS, will place on low dose ISS for now  - Readjust home medications as needed

## 2023-04-25 NOTE — H&P ADULT - NSHPPHYSICALEXAM_GEN_ALL_CORE
Vital Signs Last 24 Hrs  T(C): 36.5 (25 Apr 2023 06:14), Max: 36.5 (25 Apr 2023 06:14)  T(F): 97.7 (25 Apr 2023 06:14), Max: 97.7 (25 Apr 2023 06:14)  HR: 58 (25 Apr 2023 06:14) (54 - 66)  BP: 152/53 (25 Apr 2023 06:14) (152/53 - 168/72)  BP(mean): --  RR: 20 (25 Apr 2023 06:14) (16 - 20)  SpO2: 100% (25 Apr 2023 06:14) (100% - 100%)    Parameters below as of 25 Apr 2023 06:14  Patient On (Oxygen Delivery Method): room air    GENERAL: No acute distress, well-developed  EYES: EOMI, PERRL, conjunctiva and sclera clear  ENT: Neck supple, No JVD, moist mucosa  CHEST/LUNG: +Faint crackles bibasilar without wheezing, good air entry b/l  BACK: No spinal tenderness, no CVA TTP  HEART: Regular rate and rhythm; +MENDEL loudest at the LUSB  ABDOMEN: Soft, Nontender, Nondistended; Bowel sounds present  EXTREMITIES: +DP/PT/Radial pulses, No clubbing, cyanosis, or edema  PSYCH: Nl behavior, nl affect  NEUROLOGY: AAOx3, non-focal  SKIN: Normal color, No rashes or lesions

## 2023-04-25 NOTE — H&P ADULT - PROBLEM SELECTOR PLAN 6
- c/w synthroid - Seems to have subacute transaminitis, stable/slowly improving, last US RUQ with hepatic statosis  - Would recommend continued outpatient follow up for her transaminitis

## 2023-04-25 NOTE — CONSULT NOTE ADULT - ATTENDING COMMENTS
History/PE as above. Patient seen/ examined. Recommendations as noted-chronic anemia. Not clear that this is attributed to a GI pathologic process. Advise hematology consultation. Request resultS of prior colonoscopy and endoscopic evaluation from outside hospital. Pending hematology assessment if warranted can reevaluate any need of endoscopic evaluation regarding chronic anemia.

## 2023-04-25 NOTE — CONSULT NOTE ADULT - ASSESSMENT
Impression:     #Chronic anemia  Baseline hgb around 7, was admitted w/ hgb 6.3. No overt signs of GI bleeding, reportedly intermittent dark stools, currently has brown stool.   - Iron studies were normal, ferritin was elevated.   - colonoscopy last year, no records present here, was completed in [facility], was recommended to repeat colonoscopy in one year, for unknown reasons. No prior EGD. Denied AC, ASA and NSAID use.   - Differentials include possible chronic GI blood loss, including PUD, angioectasia, GAVE etc. less likely malignancy w/ recent colonoscopy completed last year, ACD is higher on the differential in the setting of CKD    #Severe Pulm HTN  #Moderate to severe TR  Last TTE in 2/2023 showed EF 72% w/ PASP 63 and grade II diastolic dysfunction    Recommendations:   - Chronic anemia likely related to ACD, patient might benefit from upper endoscopy and colonoscopy either as inpatient or outpatient. Would recommend obtaining colonoscopy records from [facility] which was completed last year.   - Place her on CLD for now.   - Make her NPO after midnight for potential EGD and colonoscopy.   - Patient will need pulmonary clearance prior to the procedure in the setting of severe pulm hypertension.   - CBC Q12 hours and transfuse if hgb < 7.     GI will continue to follow.     All recommendations are tentative until note is attested by an attending.     Paula Bhatt, PGY-4  Gastroenterology/Hepatology Fellow  Available on Microsoft Teams  [phone] (Short Range Pager)  [phone] (Long Range Pager)    After 5pm, please contact the on-call GI fellow. [phone]           Impression:     #Chronic anemia  Baseline hgb around 7, was admitted w/ hgb 6.3. No overt signs of GI bleeding, reportedly intermittent dark stools, currently has brown stool.   - Iron studies were normal, ferritin was elevated.   - colonoscopy last year, no records present here, was completed in [facility], was recommended to repeat colonoscopy in one year, for unknown reasons. No prior EGD. Denied AC, ASA and NSAID use.   - Differentials include possible chronic GI blood loss, including PUD, angioectasia, GAVE etc. less likely malignancy w/ recent colonoscopy completed last year, ACD is higher on the differential in the setting of CKD especially in the setting of normal iron studies.     #Severe Pulm HTN  #Moderate to severe TR  Last TTE in 2/2023 showed EF 72% w/ PASP 63 and grade II diastolic dysfunction    Recommendations:   - Chronic anemia likely related to ACD given her normal iron studies.   - Place her on CLD for now.   - Make her NPO after midnight for potential EGD and colonoscopy.   - Patient will need pulmonary clearance prior to the procedure in the setting of severe pulm hypertension.   - CBC Q12 hours and transfuse if hgb < 7.     GI will continue to follow.     All recommendations are tentative until note is attested by an attending.     Paula Bhatt, PGY-4  Gastroenterology/Hepatology Fellow  Available on Microsoft Teams  [phone] (Short Range Pager)  [phone] (Long Range Pager)    After 5pm, please contact the on-call GI fellow. [phone]           Impression:     #Chronic anemia  Baseline hgb around 7, was admitted w/ hgb 6.3. No overt signs of GI bleeding, reportedly intermittent dark stools, currently has brown stool.   - Iron studies were normal, ferritin was elevated.   - colonoscopy last year, no records present here, was completed in [facility], was recommended to repeat colonoscopy in one year, for unknown reasons. No prior EGD. Denied AC, ASA and NSAID use.   - Differentials include possible chronic GI blood loss, including PUD, angioectasia, GAVE etc. less likely malignancy w/ recent colonoscopy completed last year, ACD is higher on the differential in the setting of CKD especially in the setting of normal iron studies.     #Severe Pulm HTN  #Moderate to severe TR  Last TTE in 2/2023 showed EF 72% w/ PASP 63 and grade II diastolic dysfunction    Recommendations:   - Chronic anemia likely related to ACD given her normal iron studies, would defer endoscopic evaluation at this time given no overt signs of GI bleeding, risks outweigh benefits.   - Please obtain colonoscopy records from [facility].   - Recommend hematology consultation for chronic anemia, was previously seen by them, reportedly dx w/ BM suppression due to critical illness, might benefit from further evaluation  - Diet as tolerated.   - CBC Q12 hours and transfuse if hgb < 7.     All recommendations are tentative until note is attested by an attending.     Paula Bhatt, PGY-4  Gastroenterology/Hepatology Fellow  Available on Microsoft Teams  [phone] (Short Range Pager)  [phone] (Long Range Pager)    After 5pm, please contact the on-call GI fellow. [phone]

## 2023-04-25 NOTE — CONSULT NOTE ADULT - SUBJECTIVE AND OBJECTIVE BOX
HPI:    Allergies:  No Known Allergies      Home Medications:    Hospital Medications:  acetaminophen     Tablet .. 650 milliGRAM(s) Oral every 6 hours PRN  aluminum hydroxide/magnesium hydroxide/simethicone Suspension 30 milliLiter(s) Oral every 4 hours PRN  atorvastatin 40 milliGRAM(s) Oral at bedtime  buMETAnide 1 milliGRAM(s) Oral two times a day  dextrose 5%. 1000 milliLiter(s) IV Continuous <Continuous>  dextrose 5%. 1000 milliLiter(s) IV Continuous <Continuous>  dextrose 50% Injectable 25 Gram(s) IV Push once  dextrose 50% Injectable 12.5 Gram(s) IV Push once  dextrose 50% Injectable 25 Gram(s) IV Push once  dextrose Oral Gel 15 Gram(s) Oral once PRN  glucagon  Injectable 1 milliGRAM(s) IntraMuscular once  hydrALAZINE 50 milliGRAM(s) Oral every 8 hours  insulin lispro (ADMELOG) corrective regimen sliding scale   SubCutaneous every 6 hours  levothyroxine 125 MICROGram(s) Oral daily  melatonin 3 milliGRAM(s) Oral at bedtime PRN  ondansetron Injectable 4 milliGRAM(s) IV Push every 8 hours PRN  pantoprazole  Injectable 40 milliGRAM(s) IV Push two times a day  pantoprazole  Injectable          PMHX/PSHX:  Benign essential HTN    HTN (hypertension)    HLD (hyperlipidemia)    DM (diabetes mellitus)    Hypothyroid    H/O pulmonary hypertension    CKD (chronic kidney disease)    No significant past surgical history    S/P cataract surgery        Family history:  No pertinent family history in first degree relatives    No pertinent family history in first degree relatives    FH: stroke (Father)        Denies family history of colon cancer/polyps, stomach cancer/polyps, pancreatic cancer/masses, liver cancer/disease, ovarian cancer and endometrial cancer.    Social History:   Tob: Denies  EtOH: Denies  Illicit Drugs: Denies    ROS:     General:  No wt loss, fevers, chills, night sweats, fatigue  Eyes:  Good vision, no reported pain  ENT:  No sore throat, pain, runny nose, dysphagia  CV:  No pain, palpitations, hypo/hypertension  Pulm:  No dyspnea, cough, tachypnea, wheezing  GI:  see HPI  :  No pain, bleeding, incontinence, nocturia  Muscle:  No pain, weakness  Neuro:  No weakness, tingling, memory problems  Psych:  No fatigue, insomnia, mood problems, depression  Endocrine:  No polyuria, polydipsia, cold/heat intolerance  Heme:  No petechiae, ecchymosis, easy bruisability  Skin:  No rash, tattoos, scars, edema    PHYSICAL EXAM:     GENERAL:  No acute distress  HEENT:  NCAT, no scleral icterus   CHEST:  no respiratory distress  HEART:  Regular rate and rhythm  ABDOMEN:  Soft, non-tender, non-distended, normoactive bowel sounds,  no masses  EXTREMITIES: No edema  SKIN:  No rash/erythema/ecchymoses/petechiae/wounds/abscess/warm/dry  NEURO:  Alert and oriented x 3, no asterixis    Vital Signs:  Vital Signs Last 24 Hrs  T(C): 36.5 (25 Apr 2023 06:14), Max: 36.5 (25 Apr 2023 06:14)  T(F): 97.7 (25 Apr 2023 06:14), Max: 97.7 (25 Apr 2023 06:14)  HR: 58 (25 Apr 2023 06:14) (54 - 66)  BP: 152/53 (25 Apr 2023 06:14) (152/53 - 168/72)  BP(mean): --  RR: 20 (25 Apr 2023 06:14) (16 - 20)  SpO2: 100% (25 Apr 2023 06:14) (100% - 100%)    Parameters below as of 25 Apr 2023 06:14  Patient On (Oxygen Delivery Method): room air      Daily Height in cm: 157.48 (25 Apr 2023 01:37)    Daily     LABS:                        7.2    4.89  )-----------( 212      ( 25 Apr 2023 09:40 )             22.5     Mean Cell Volume: 77.9 fL (04-25-23 @ 09:40)    04-24    133<L>  |  99  |  67<H>  ----------------------------<  444<H>  5.0   |  21<L>  |  1.98<H>    Ca    9.3      24 Apr 2023 17:32    TPro  6.8  /  Alb  3.6  /  TBili  0.3  /  DBili  x   /  AST  34<H>  /  ALT  44<H>  /  AlkPhos  612<H>  04-24    LIVER FUNCTIONS - ( 24 Apr 2023 17:32 )  Alb: 3.6 g/dL / Pro: 6.8 g/dL / ALK PHOS: 612 U/L / ALT: 44 U/L / AST: 34 U/L / GGT: x           PT/INR - ( 24 Apr 2023 17:32 )   PT: 14.1 sec;   INR: 1.21 ratio         PTT - ( 24 Apr 2023 17:32 )  PTT:44.4 sec                            7.2    4.89  )-----------( 212      ( 25 Apr 2023 09:40 )             22.5                         7.1    4.84  )-----------( 221      ( 25 Apr 2023 05:51 )             22.2                         7.1    4.75  )-----------( 203      ( 25 Apr 2023 03:30 )             22.0                         6.3    5.40  )-----------( 224      ( 24 Apr 2023 20:40 )             20.2                         6.8    5.68  )-----------( 257      ( 24 Apr 2023 17:32 )             21.6       Imaging:             HPI:    Ms. Hernandez is a 57F with history of DM2, HTN, HLD, CKD (Most recently CKD 3-4), Hypothyroidism (recent admission for myxedema coma), Pulmonary Arterial Hypertension, and Asthma who presented w/ anemia. Pt. had multiple hospitalizations in the past, was admitted in 1/2023 for HF exacerbation and [facility], during that admission, she was anemic which was attributed to myelosuppression due to critical illness. She was also admitted in 2/23 for chest pain and bradycardia, for which was found to have severe PH and severe TR. Had 2 prior hospitalizations for myxedema coma, for which endocrine was consulted. Pt. is a poor historian. Reported that she was d/c March 1 st and was doing but developed SOB w/ exertion. She completed routine blood work last week and was found to have hgb 6.5, so was told to come to the ED. Reported she has dark bowel movements intermittently, denied black stools, no abd pain, nausea or vomiting. Had colonoscopy last year in [facility], she was told to repeat the procedure in one year, unclear reason. No prior EGD. Denied hematochezia. On admission, VSS, lab showed hgb 6.3. GI consulted anemia.     Allergies:  No Known Allergies    Home Medications:  · 	Prandin 1 mg oral tablet: Last Dose Taken:  , 0.5 tab(s) orally 3 times a day (before meals)   	  	Please do not take if you skip a meal  · 	Januvia 25 mg oral tablet: Last Dose Taken:  , 1 tab(s) orally once a day   · 	levothyroxine 125 mcg (0.125 mg) oral tablet: Last Dose Taken:  , 1 tab(s) orally once a day  · 	hydrALAZINE 50 mg oral tablet: Last Dose Taken:  , 1 tab(s) orally every 8 hours  · 	bumetanide 1 mg oral tablet: Last Dose Taken:  , 1 tab(s) orally 2 times a day  · 	atorvastatin 40 mg oral tablet: Last Dose Taken:  , 1 tab(s) orally once a day (at bedtime)  · 	omeprazole 20 mg oral delayed release tablet: Last Dose Taken:  , 1 tab(s) orally once a day    Hospital Medications:  acetaminophen     Tablet .. 650 milliGRAM(s) Oral every 6 hours PRN  aluminum hydroxide/magnesium hydroxide/simethicone Suspension 30 milliLiter(s) Oral every 4 hours PRN  atorvastatin 40 milliGRAM(s) Oral at bedtime  buMETAnide 1 milliGRAM(s) Oral two times a day  dextrose 5%. 1000 milliLiter(s) IV Continuous <Continuous>  dextrose 5%. 1000 milliLiter(s) IV Continuous <Continuous>  dextrose 50% Injectable 25 Gram(s) IV Push once  dextrose 50% Injectable 12.5 Gram(s) IV Push once  dextrose 50% Injectable 25 Gram(s) IV Push once  dextrose Oral Gel 15 Gram(s) Oral once PRN  glucagon  Injectable 1 milliGRAM(s) IntraMuscular once  hydrALAZINE 50 milliGRAM(s) Oral every 8 hours  insulin lispro (ADMELOG) corrective regimen sliding scale   SubCutaneous every 6 hours  levothyroxine 125 MICROGram(s) Oral daily  melatonin 3 milliGRAM(s) Oral at bedtime PRN  ondansetron Injectable 4 milliGRAM(s) IV Push every 8 hours PRN  pantoprazole  Injectable 40 milliGRAM(s) IV Push two times a day  pantoprazole  Injectable          PMHX/PSHX:  Benign essential HTN    HTN (hypertension)    HLD (hyperlipidemia)    DM (diabetes mellitus)    Hypothyroid    H/O pulmonary hypertension    CKD (chronic kidney disease)    No significant past surgical history    S/P cataract surgery    Family history:   stroke (Father)    Social History:   Tob: Denies  EtOH: Denies  Illicit Drugs: Denies    ROS:     General:  No wt loss, fevers, chills, night sweats, fatigue  Eyes:  Good vision, no reported pain  ENT:  No sore throat, pain, runny nose, dysphagia  CV:  No pain, palpitations, hypo/hypertension  Pulm:  No dyspnea, cough, tachypnea, wheezing  GI:  see HPI  :  No pain, bleeding, incontinence, nocturia  Muscle:  No pain, weakness  Neuro:  No weakness, tingling, memory problems  Psych:  No fatigue, insomnia, mood problems, depression  Endocrine:  No polyuria, polydipsia, cold/heat intolerance  Heme:  No petechiae, ecchymosis, easy bruisability  Skin:  No rash, tattoos, scars, edema    PHYSICAL EXAM:     GENERAL:  No acute distress, lying in bed, sleeping.   HEENT:  NCAT, no scleral icterus   CHEST:  no respiratory distress  HEART:  Regular rate and rhythm  ABDOMEN:  Soft, non-tender, non-distended, no masses  EXTREMITIES: No LE edema  SKIN:  No rash/erythema/ecchymoses/petechiae/wounds/abscess/warm/dry  NEURO:  Alert and oriented x 3, no tremors    Vital Signs:  Vital Signs Last 24 Hrs  T(C): 36.5 (25 Apr 2023 06:14), Max: 36.5 (25 Apr 2023 06:14)  T(F): 97.7 (25 Apr 2023 06:14), Max: 97.7 (25 Apr 2023 06:14)  HR: 58 (25 Apr 2023 06:14) (54 - 66)  BP: 152/53 (25 Apr 2023 06:14) (152/53 - 168/72)  BP(mean): --  RR: 20 (25 Apr 2023 06:14) (16 - 20)  SpO2: 100% (25 Apr 2023 06:14) (100% - 100%)    Parameters below as of 25 Apr 2023 06:14  Patient On (Oxygen Delivery Method): room air      Daily Height in cm: 157.48 (25 Apr 2023 01:37)    Daily     LABS:                        7.2    4.89  )-----------( 212      ( 25 Apr 2023 09:40 )             22.5     Mean Cell Volume: 77.9 fL (04-25-23 @ 09:40)    04-24    133<L>  |  99  |  67<H>  ----------------------------<  444<H>  5.0   |  21<L>  |  1.98<H>    Ca    9.3      24 Apr 2023 17:32    TPro  6.8  /  Alb  3.6  /  TBili  0.3  /  DBili  x   /  AST  34<H>  /  ALT  44<H>  /  AlkPhos  612<H>  04-24    LIVER FUNCTIONS - ( 24 Apr 2023 17:32 )  Alb: 3.6 g/dL / Pro: 6.8 g/dL / ALK PHOS: 612 U/L / ALT: 44 U/L / AST: 34 U/L / GGT: x           PT/INR - ( 24 Apr 2023 17:32 )   PT: 14.1 sec;   INR: 1.21 ratio         PTT - ( 24 Apr 2023 17:32 )  PTT:44.4 sec                            7.2    4.89  )-----------( 212      ( 25 Apr 2023 09:40 )             22.5                         7.1    4.84  )-----------( 221      ( 25 Apr 2023 05:51 )             22.2                         7.1    4.75  )-----------( 203      ( 25 Apr 2023 03:30 )             22.0                         6.3    5.40  )-----------( 224      ( 24 Apr 2023 20:40 )             20.2                         6.8    5.68  )-----------( 257      ( 24 Apr 2023 17:32 )             21.6       Imaging:    US abdomen on 2/26/23      FINDINGS:  Liver: Increased echogenicity.  Bile ducts: Normal caliber. Common bile duct measures 6 mm.  Gallbladder: Numerous small mobile gallstones occupying the dependent   gallbladder lumen. No gallbladder wall thickening or pericholecystic   fluid. Negative Reeder sign.  Pancreas: Visualized portions are within normal limits.  Right kidney: 9.9 cm. No hydronephrosis.  Ascites: Mild ascites.  IVC: Visualized portions are within normal limits.    Additional findings: Partial imaged right pleural effusion.    IMPRESSION:    1. Mildly increased echogenicity of the liver suggestive of hepatic   steatosis. No biliary ductal dilatation.  2. Cholelithiasis without evidence of cholecystitis.  3. Partially imaged right pleural effusion.  4. Mild ascites.    CT A/P on 1/18/23    PROCEDURE:  CT of the Abdomen and Pelvis was performed.  Sagittal and coronal reformats were performed.    FINDINGS:  LOWER CHEST: Patchy airspace opacity in both lung bases, increased   compared to prior exam. Bilateral consolidation, left greater than right.   Moderate right and small left pleural effusion.    LIVER: Within normal limits.  BILE DUCTS: Normal caliber.  GALLBLADDER: Cholelithiasis.  SPLEEN: Within normal limits.  PANCREAS: Within normal limits.  ADRENALS: Within normal limits.  KIDNEYS/URETERS: Within normal limits.    BLADDER: Excreted contrast in the bladder.  REPRODUCTIVE ORGANS: Small calcified uterine myoma. Bilateral adnexa   within normal limits.    BOWEL: No bowel obstruction. Appendix is normal.  PERITONEUM: No ascites.  VESSELS: Atherosclerotic calcifications.  RETROPERITONEUM/LYMPH NODES: No lymphadenopathy. No retroperitoneal   hematoma.  ABDOMINAL WALL: Within normal limits.  BONES: Within normal limits.    IMPRESSION:  *  No evidence of retroperitoneal or intra-abdominal hematoma.  *  Increasing basilar airspace opacity compared to prior examination with   dense consolidation in both lower lobes.  *  Right greater than left pleural effusions, not significantly changed.

## 2023-04-25 NOTE — H&P ADULT - NSHPLABSRESULTS_GEN_ALL_CORE
LABS and ADDITIONAL STUDIES:                        7.1    4.75  )-----------( 203      ( 25 Apr 2023 03:30 )             22.0     04-24    133<L>  |  99  |  67<H>  ----------------------------<  444<H>  5.0   |  21<L>  |  1.98<H>    Ca    9.3      24 Apr 2023 17:32    TPro  6.8  /  Alb  3.6  /  TBili  0.3  /  DBili  x   /  AST  34<H>  /  ALT  44<H>  /  AlkPhos  612<H>  04-24    LIVER FUNCTIONS - ( 24 Apr 2023 17:32 )  Alb: 3.6 g/dL / Pro: 6.8 g/dL / ALK PHOS: 612 U/L / ALT: 44 U/L / AST: 34 U/L / GGT: x           PT/INR - ( 24 Apr 2023 17:32 )   PT: 14.1 sec;   INR: 1.21 ratio     PTT - ( 24 Apr 2023 17:32 )  PTT:44.4 sec    Iron with Total Binding Capacity (04.24.23 @ 17:32)   Iron - Total Binding Capacity.: 296 ug/dL  % Saturation, Iron: 17 %  Iron Total, Serum: 50 ug/dL  Unsaturated Iron Binding Capacity: 246 ug/dL  Ferritin, Serum (04.24.23 @ 17:32)   Ferritin, Serum: 820 ng/mL    Blood Gas Profile - Venous (04.24.23 @ 17:32)   pH, Venous: 7.27  pCO2, Venous: 52 mmHg  pO2, Venous: 38 mmHg  HCO3, Venous: 24 mmol/L  Base Excess, Venous: -2.9 mmol/L  Oxygen Saturation, Venous: 58.0 %  Total CO2, Venous: 25.5 mmol/L    < from: Xray Chest 2 Views PA/Lat (04.24.23 @ 18:11) >    IMPRESSION:  No acute pulmonary disease.  Scattered bilateral hazy and reticular opacities, unchanged from   2/25/2023.    --- End of Report ---    < end of copied text >    EKG - Sinus rhythm with 1st degree AV block, rate 65, QTc 451, TWI V2 (present on prior), no significant ST-T wave changes

## 2023-04-25 NOTE — H&P ADULT - PROBLEM SELECTOR PLAN 9
DVT ppx - SCDs  Diet - NPO for now  Activity - OOB to chair/with assistance, Increase as tolerated, PT eval    Fall and aspiration precautions DVT ppx - SCDs  Diet - NPO for now  Activity - OOB to chair/with assistance, Increase as tolerated, PT eval    Fall and aspiration precautions  Med Hx pharmacist emailed to help reconcile patient's medications, currently continued as per recent discharge which patient states is her uptodate medications. - Currently euvolemic, will c/w bumex

## 2023-04-25 NOTE — H&P ADULT - PROBLEM SELECTOR PLAN 4
- Poorly controlled DM2, patient recently had her long acting insulin stopped on her recent admission due to hypoglycemia, patient and family also report her having issues with hypoglycemia at home though FS seem to be variable (pt states FS range from 80s to 300s in AM)  - Patient reports poor dietary compliance which likely is playing some role in her hyperglycemia  - Monitor FS, will place on low dose ISS for now  - Readjust home medications as needed - Initially in ED with temps within normal ranges but on arrival to the [facility] her temp dropped to 94.6, denies any infectious complaints  - Was also hypothermic on her prior admission but unclear etiology  - s/p warming blanket now with temps improved  - Continue to monitor patient's temps

## 2023-04-25 NOTE — H&P ADULT - PROBLEM SELECTOR PLAN 1
- Worsening anemia, unclear on cause, possibly 2/2 CKD vs AOCD vs GI losses vs BM suppression from recent thyroid issues vs other  - Iron studies more suggestive of AOCD with nl iron/TIBC and elevated ferritin  - Lower suspicion of acute GI losses but given reports of dark BMs recently would want to obtain GI eval -> place on pantoprazole 40mg IVP BID, NPO for now  - Check retic count to assess for BM issues (add on to initial lab work)  - If no GI work up as inpatient then likely outpatient follow up for repeat colonoscopy/?EGD  - Transfuse as needed, patient seems to have responded well to her 1U pRBC transfusion

## 2023-04-25 NOTE — PROVIDER CONTACT NOTE (OTHER) - ASSESSMENT
Rectal temp 95F, HR 55, /73, SpO2 100%, RR 18. pt reports feeling cold and that she always has low temperature

## 2023-04-25 NOTE — H&P ADULT - PROBLEM SELECTOR PLAN 3
- Noted to have some respiratory acidosis, possibly in setting of her chronic lung changes on CXR  - On lung auscultation has some fine crackles mostly in the bases  - Check repeat VBG in AM, if still acidotic consider further evaluation for her acidosis

## 2023-04-25 NOTE — H&P ADULT - PROBLEM SELECTOR PLAN 10
DVT ppx - SCDs  Diet - NPO for now  Activity - OOB to chair/with assistance, Increase as tolerated, PT eval    Fall and aspiration precautions  Med Hx pharmacist emailed to help reconcile patient's medications, currently continued as per recent discharge which patient states is her uptodate medications.

## 2023-04-25 NOTE — H&P ADULT - NSHPREVIEWOFSYSTEMS_GEN_ALL_CORE
REVIEW OF SYSTEMS:    CONSTITUTIONAL: +Generalized weakness but improving from prior, No fevers or chills  EYES: No visual changes or eye discharge  ENT: No rhinorrhea or sore throat  NECK: No pain or stiffness  RESPIRATORY: No cough, wheezing, hemoptysis; No shortness of breath  CARDIOVASCULAR: No chest pain or palpitations; No lower extremity edema, no exertional chest pain  GASTROINTESTINAL: No abdominal or epigastric pain. No nausea, vomiting, or hematemesis; No diarrhea or constipation. No melena or hematochezia.  BACK: No back pain, no flank pain  GENITOURINARY: No dysuria, frequency or hematuria  NEUROLOGICAL: No numbness or weakness  SKIN: No itching, burning, rashes, or lesions

## 2023-04-25 NOTE — H&P ADULT - HISTORY OF PRESENT ILLNESS
This is a 57F with history of DM2, HTN, HLD, CKD (Most recently CKD 3-4), Hypothyroidism (recent admission for myxedema coma), Pulmonary Arterial Hypertension, and Asthma who presents for low Hgb as an outpatient. Patient was recently admitted at [facility] initially in January for myxedema coma requiring ICU stay and then in late February for ADHF. Was discharged on march 1st and said that she was doing well at home. Said that she still has some MARKS but its better than prior to her recent admission. Otherwise denies any significant chest pain, palpitations, syncope, or worsening SOB with exertion. Denies any known bleeding episodes but states that her BMs have been dark at times (though most recent BM was brown in color). No other acute complaints. Patient states she had blood work with her PCP last week that resulted yesterday and was told by her PCP that her Hgb was 6.5 and that she should come to the hospital for a blood transfusion. Patient reports having a colonoscopy early last year but does not know the results, does state that she was supposed to have a repeat colonoscopy but was not able to schedule it.     On arrival to the ED, her vitals were T 97.6, P 66, /72, RR 16, O2 sat 100% RA. Her lab work showed anemia with stable CKD and mild transaminitis. Her CXR showed unchanged bilateral scattered and hazy reticular opacities. She was given admelog 4U and PRBC 1U (second unit held as per temp had dropped when she arrived to the [facility]). She was admitted to medicine.

## 2023-04-25 NOTE — PATIENT PROFILE ADULT - FALL HARM RISK - HARM RISK INTERVENTIONS

## 2023-04-26 ENCOUNTER — TRANSCRIPTION ENCOUNTER (OUTPATIENT)
Age: 58
End: 2023-04-26

## 2023-04-26 VITALS
DIASTOLIC BLOOD PRESSURE: 60 MMHG | HEART RATE: 60 BPM | RESPIRATION RATE: 18 BRPM | TEMPERATURE: 98 F | OXYGEN SATURATION: 100 % | SYSTOLIC BLOOD PRESSURE: 158 MMHG

## 2023-04-26 LAB
ALBUMIN SERPL ELPH-MCNC: 3.4 G/DL — SIGNIFICANT CHANGE UP (ref 3.3–5)
ALP SERPL-CCNC: 575 U/L — HIGH (ref 40–120)
ALT FLD-CCNC: 35 U/L — HIGH (ref 4–33)
ANION GAP SERPL CALC-SCNC: 15 MMOL/L — HIGH (ref 7–14)
AST SERPL-CCNC: 29 U/L — SIGNIFICANT CHANGE UP (ref 4–32)
BASOPHILS # BLD AUTO: 0.03 K/UL — SIGNIFICANT CHANGE UP (ref 0–0.2)
BASOPHILS NFR BLD AUTO: 0.5 % — SIGNIFICANT CHANGE UP (ref 0–2)
BILIRUB SERPL-MCNC: 0.4 MG/DL — SIGNIFICANT CHANGE UP (ref 0.2–1.2)
BUN SERPL-MCNC: 59 MG/DL — HIGH (ref 7–23)
CALCIUM SERPL-MCNC: 9.4 MG/DL — SIGNIFICANT CHANGE UP (ref 8.4–10.5)
CHLORIDE SERPL-SCNC: 101 MMOL/L — SIGNIFICANT CHANGE UP (ref 98–107)
CO2 SERPL-SCNC: 22 MMOL/L — SIGNIFICANT CHANGE UP (ref 22–31)
CREAT SERPL-MCNC: 1.87 MG/DL — HIGH (ref 0.5–1.3)
EGFR: 31 ML/MIN/1.73M2 — LOW
EOSINOPHIL # BLD AUTO: 0.15 K/UL — SIGNIFICANT CHANGE UP (ref 0–0.5)
EOSINOPHIL NFR BLD AUTO: 2.7 % — SIGNIFICANT CHANGE UP (ref 0–6)
FOLATE SERPL-MCNC: >20 NG/ML — HIGH (ref 3.1–17.5)
GLUCOSE BLDC GLUCOMTR-MCNC: 115 MG/DL — HIGH (ref 70–99)
GLUCOSE BLDC GLUCOMTR-MCNC: 291 MG/DL — HIGH (ref 70–99)
GLUCOSE SERPL-MCNC: 88 MG/DL — SIGNIFICANT CHANGE UP (ref 70–99)
HCT VFR BLD CALC: 24.1 % — LOW (ref 34.5–45)
HGB BLD-MCNC: 7.7 G/DL — LOW (ref 11.5–15.5)
IANC: 3.82 K/UL — SIGNIFICANT CHANGE UP (ref 1.8–7.4)
IMM GRANULOCYTES NFR BLD AUTO: 0.2 % — SIGNIFICANT CHANGE UP (ref 0–0.9)
LYMPHOCYTES # BLD AUTO: 1.06 K/UL — SIGNIFICANT CHANGE UP (ref 1–3.3)
LYMPHOCYTES # BLD AUTO: 19 % — SIGNIFICANT CHANGE UP (ref 13–44)
MAGNESIUM SERPL-MCNC: 1.7 MG/DL — SIGNIFICANT CHANGE UP (ref 1.6–2.6)
MCHC RBC-ENTMCNC: 25.3 PG — LOW (ref 27–34)
MCHC RBC-ENTMCNC: 32 GM/DL — SIGNIFICANT CHANGE UP (ref 32–36)
MCV RBC AUTO: 79.3 FL — LOW (ref 80–100)
MONOCYTES # BLD AUTO: 0.51 K/UL — SIGNIFICANT CHANGE UP (ref 0–0.9)
MONOCYTES NFR BLD AUTO: 9.1 % — SIGNIFICANT CHANGE UP (ref 2–14)
NEUTROPHILS # BLD AUTO: 3.82 K/UL — SIGNIFICANT CHANGE UP (ref 1.8–7.4)
NEUTROPHILS NFR BLD AUTO: 68.5 % — SIGNIFICANT CHANGE UP (ref 43–77)
NRBC # BLD: 0 /100 WBCS — SIGNIFICANT CHANGE UP (ref 0–0)
NRBC # FLD: 0 K/UL — SIGNIFICANT CHANGE UP (ref 0–0)
PHOSPHATE SERPL-MCNC: 4.2 MG/DL — SIGNIFICANT CHANGE UP (ref 2.5–4.5)
PLATELET # BLD AUTO: 221 K/UL — SIGNIFICANT CHANGE UP (ref 150–400)
POTASSIUM SERPL-MCNC: 4.1 MMOL/L — SIGNIFICANT CHANGE UP (ref 3.5–5.3)
POTASSIUM SERPL-SCNC: 4.1 MMOL/L — SIGNIFICANT CHANGE UP (ref 3.5–5.3)
PROT SERPL-MCNC: 6.8 G/DL — SIGNIFICANT CHANGE UP (ref 6–8.3)
RBC # BLD: 3.04 M/UL — LOW (ref 3.8–5.2)
RBC # FLD: 15.8 % — HIGH (ref 10.3–14.5)
SODIUM SERPL-SCNC: 138 MMOL/L — SIGNIFICANT CHANGE UP (ref 135–145)
VIT B12 SERPL-MCNC: 1138 PG/ML — HIGH (ref 200–900)
WBC # BLD: 5.58 K/UL — SIGNIFICANT CHANGE UP (ref 3.8–10.5)
WBC # FLD AUTO: 5.58 K/UL — SIGNIFICANT CHANGE UP (ref 3.8–10.5)

## 2023-04-26 PROCEDURE — 99232 SBSQ HOSP IP/OBS MODERATE 35: CPT | Mod: GC

## 2023-04-26 PROCEDURE — 99239 HOSP IP/OBS DSCHRG MGMT >30: CPT

## 2023-04-26 RX ADMIN — PANTOPRAZOLE SODIUM 40 MILLIGRAM(S): 20 TABLET, DELAYED RELEASE ORAL at 06:20

## 2023-04-26 RX ADMIN — Medication 50 MILLIGRAM(S): at 13:12

## 2023-04-26 RX ADMIN — Medication 3: at 13:18

## 2023-04-26 RX ADMIN — Medication 50 MILLIGRAM(S): at 06:20

## 2023-04-26 RX ADMIN — Medication 125 MICROGRAM(S): at 06:19

## 2023-04-26 RX ADMIN — BUMETANIDE 1 MILLIGRAM(S): 0.25 INJECTION INTRAMUSCULAR; INTRAVENOUS at 06:20

## 2023-04-26 RX ADMIN — BUMETANIDE 1 MILLIGRAM(S): 0.25 INJECTION INTRAMUSCULAR; INTRAVENOUS at 13:12

## 2023-04-26 NOTE — PHYSICAL THERAPY INITIAL EVALUATION ADULT - ADDITIONAL COMMENTS
Pt lives with her  and kids in a house with 4 stairs to enter and 9 steps to the bedroom. prior to admission Pt was independent with all mobility and ambulated without an assistive device. Pt denies any falls over the last 6 months.     Pt. left comfortable in bed with all tubes/lines intact, call bell in reach and in NAD.

## 2023-04-26 NOTE — DISCHARGE NOTE NURSING/CASE MANAGEMENT/SOCIAL WORK - NSDCPEFALRISK_GEN_ALL_CORE
For information on Fall & Injury Prevention, visit: [email or web address] OR  [email or web address] OR  https://www.cdc.gov/steadi/patient.html

## 2023-04-26 NOTE — PROGRESS NOTE ADULT - PROBLEM SELECTOR PLAN 3
- Noted to have some respiratory acidosis, possibly in setting of her chronic lung changes on CXR  - On lung auscultation has some fine crackles mostly in the bases  - Check repeat VBG in AM, if still acidotic consider further evaluation for her acidosis
- Noted to have some respiratory acidosis, possibly in setting of her chronic lung changes on CXR  - On lung auscultation has some fine crackles mostly in the bases  - Check repeat VBG in AM, if still acidotic consider further evaluation for her acidosis

## 2023-04-26 NOTE — PROGRESS NOTE ADULT - ATTENDING COMMENTS
per GI no plans for scope  pt denies serjio bleed or melena  counts stable today  stable for dc home w out-pt f.u

## 2023-04-26 NOTE — PROGRESS NOTE ADULT - ASSESSMENT
Impression:     #Chronic anemia  Baseline hgb around 7, was admitted w/ hgb 6.3. No overt signs of GI bleeding, reportedly intermittent dark stools, currently has brown stool.   - Iron studies were normal, ferritin was elevated.   - colonoscopy last year, no records present here, was completed in [facility], was recommended to repeat colonoscopy in one year, for unknown reasons. No prior EGD. Denied AC, ASA and NSAID use.   - Differentials include possible chronic GI blood loss, including PUD, angioectasia, GAVE etc. less likely malignancy w/ recent colonoscopy completed last year, ACD is higher on the differential in the setting of CKD especially in the setting of normal iron studies.   - Hgb stable around 7, had yellow stool this morning. Would hold off GI intervention at this time. Can consider outpatient follow up.     #Severe Pulm HTN  #Moderate to severe TR  Last TTE in 2/2023 showed EF 72% w/ PASP 63 and grade II diastolic dysfunction    Recommendations:   - Chronic anemia likely related to ACD given her normal iron studies, would defer endoscopic evaluation at this time given no overt signs of GI bleeding, risks outweigh benefits. Patient could consider outpatient follow up for endoscopic evaluation.   - Please obtain colonoscopy records from [facility].   - Recommend hematology consultation for chronic anemia, was previously seen by them, reportedly dx w/ BM suppression due to critical illness, might benefit from further evaluation  - Diet as tolerated.   - CBC Q12 hours and transfuse if hgb < 7.     Thank you for the consult. Please call us back if you have any questions or concerns.     All recommendations are tentative until the note is attested by an attending.     Paula Bhatt, PGY-4  Gastroenterology/Hepatology Fellow  Available on [employer] Teams  [phone] (Short Range Pager)  [phone] (Long Range Pager)    After 5pm, please contact the on-call GI fellow. [phone]    
This is a 57F with history as above who presents to the hospital with anemia as outpatient. 
This is a 57F with history as above who presents to the hospital with anemia as outpatient.

## 2023-04-26 NOTE — DISCHARGE NOTE PROVIDER - NSDCFUSCHEDAPPT_GEN_ALL_CORE_FT
La [place]ena Gay  [facility]  CARDIOLOGY [address] [address] [facility]  Scheduled Appointment: 05/09/2023    Courtney Dao  [facility]  PULMMED [address]  Scheduled Appointment: 06/13/2023    [facility]  ENDOCRIN [address]  Scheduled Appointment: 06/20/2023

## 2023-04-26 NOTE — PROGRESS NOTE ADULT - PROBLEM SELECTOR PLAN 5
- Seems to have subacute transaminitis, stable/slowly improving, last US RUQ with hepatic statosis  - Would recommend continued outpatient follow up for her transaminitis
- Seems to have subacute transaminitis, stable/slowly improving, last US RUQ with hepatic statosis  - Would recommend continued outpatient follow up for her transaminitis

## 2023-04-26 NOTE — PROGRESS NOTE ADULT - ATTENDING COMMENTS
#Chronic anemia  Baseline hgb around 7, was admitted w/ hgb 6.3. No overt signs of GI bleeding, reportedly intermittent dark stools, currently has brown stool.   - Iron studies were normal, ferritin was elevated.   - colonoscopy last year, no records present here, was completed in [facility], was recommended to repeat colonoscopy in one year, for unknown reasons. No prior EGD. Denied AC, ASA and NSAID use.   - Differentials include possible chronic GI blood loss, including PUD, angioectasia, GAVE etc. less likely malignancy w/ recent colonoscopy completed last year, ACD is higher on the differential in the setting of CKD especially in the setting of normal iron studies.   - Hgb stable around 7, had yellow stool this morning. Would hold off GI intervention at this time. Can consider outpatient follow up.     #Severe Pulm HTN  #Moderate to severe TR  Last TTE in 2/2023 showed EF 72% w/ PASP 63 and grade II diastolic dysfunction    Recommendations:   - Chronic anemia likely related to ACD given her normal iron studies, would defer endoscopic evaluation at this time given no overt signs of GI bleeding, risks outweigh benefits. Patient could consider outpatient follow up for endoscopic evaluation.   - Please obtain colonoscopy records from [facility].   - Recommend hematology consultation for chronic anemia, was previously seen by them, reportedly dx w/ BM suppression due to critical illness, might benefit from further evaluation  - Diet as tolerated.   - CBC Q12 hours and transfuse if hgb < 7.

## 2023-04-26 NOTE — DISCHARGE NOTE PROVIDER - NSFOLLOWUPCLINICS_GEN_ALL_ED_FT
[facility]  Gastroenterology  [address] [place] [address]  Phone: [phone]  Fax:

## 2023-04-26 NOTE — PROGRESS NOTE ADULT - PROBLEM SELECTOR PLAN 9
DVT ppx - SCDs  Diet - NPO for now  Activity - OOB to chair/with assistance, Increase as tolerated, PT eval    Fall and aspiration precautions
DVT ppx - SCDs  Diet - NPO for now  Activity - OOB to chair/with assistance, Increase as tolerated, PT eval    Fall and aspiration precautions

## 2023-04-26 NOTE — DISCHARGE NOTE NURSING/CASE MANAGEMENT/SOCIAL WORK - PATIENT PORTAL LINK FT
You can access the FollowMyHealth Patient Portal offered by [facility] by registering at the following website: [email or web address]. By joining [employer]’s FollowMyHealth portal, you will also be able to view your health information using other applications (apps) compatible with our system.

## 2023-04-26 NOTE — DISCHARGE NOTE PROVIDER - NSDCCPCAREPLAN_GEN_ALL_CORE_FT
PRINCIPAL DISCHARGE DIAGNOSIS  Diagnosis: Anemia  Assessment and Plan of Treatment:       SECONDARY DISCHARGE DIAGNOSES  Diagnosis: Dark stools  Assessment and Plan of Treatment:     Diagnosis: Fatigue  Assessment and Plan of Treatment:      PRINCIPAL DISCHARGE DIAGNOSIS  Diagnosis: Anemia  Assessment and Plan of Treatment: You presented with a low hemoglobin, which is seen in patients with anemia. You likely have anemia of chronic disease (ACD): a type of anemia occurs in people with chronic or long-term illnesses, such as rheumatoid arthritis or in your case: chronic kidney disease.  ACD is characterized by low levels of red blood cells, which are responsible for carrying oxygen throughout the body. The condition develops as a result of the body's response to the chronic illness, which can interfere with the normal production of red blood cells.  The symptoms of ACD are similar to those of other types of anemia and may include fatigue, weakness, shortness of breath, dizziness, and pale skin. However, in some cases, people with ACD may not experience any symptoms at all.  Treatment for ACD involves managing the underlying chronic illness. In some cases, a medication called erythropoietin-stimulating agents (ESAs) may be used to stimulate the production of red blood cells. However, ESAs carry risks and are generally only used in people with severe anemia who have not responded to other treatments.   After you were transfused with packed red blood cells your hemoglobin per to 7.7 and you were deemed medically stable for discharge home.      SECONDARY DISCHARGE DIAGNOSES  Diagnosis: Dark stools  Assessment and Plan of Treatment: You presented to the hospital for treatment of your anemia as noted above, however, you were also complaining of dark stools. Foods that are high in iron, such as red meat or spinach, can turn stools dark in color. Iron supplements and some other medications, such as Pepto-Bismol, can also cause dark stools. You were seen by Gastroenterology who evaluated you and stated that you do not need to have a repeat colonoscopy at the current moment in the hospital, given that you already had one colonoscopy done one year ago. It is important to pay attention to any other symptoms you may be experiencing along with dark stools, such as abdominal pain, nausea, vomiting, or diarrhea. If you are experiencing these symptoms, it is important to seek medical attention right away, as they may indicate a more serious underlying condition.  If you are not experiencing any other symptoms and believe that the dark stools are due to dietary or medication factors, you may wish to make changes to your diet or speak with your healthcare provider.  If you are concerned about dark stools or experiencing any other symptoms, please do not hesitate to speak with your primary care physician, if you wish to have them refer you to a gastroenterologist. We have also provided in your discharge paperwork a gastroenterology clinic you can call if you wish to be seen.    Diagnosis: Fatigue  Assessment and Plan of Treatment: Fatigue is a common symptom of anemia of chronic disease (ACD) and can occur due to several factors related to the condition. One of the main factors contributing to fatigue in ACD is the decrease in oxygen-carrying capacity of the blood due to the low levels of red blood cells. With fewer red blood cells to carry oxygen to the tissues and organs, the body may not be receiving enough oxygen to function properly, leading to feelings of tiredness and weakness. Chronic inflammation associated with ACD can also contribute to fatigue.   The truth is, everyone feels tired from time to time. But fatigue means feeling severely overtired. Extreme fatigue makes it hard to get up in the morning, go to work, do your usual activities and make it through your day. Fatigue feels like you have an overwhelming urge to sleep, but you may not feel refreshed after you rest or sleep.lifestyle changes may improve your symptoms. To reduce fatigue, you can: Practice good sleep habits: Aim for seven to nine hours of sleep every night. Don’t drink caffeine, use electronics or exercise right before bed. Avoid alcohol and substance use: Don’t use illegal drugs, and drink alcohol in moderation, if at all.  Eat a healthy diet: A balanced diet and plenty of water will keep your body nourished and hydrated.  Manage stress: Yoga, mindfulness, meditation and regular exercise can help you relieve stress and gain more energy.  Exercise often: Regular exercise is crucial for a healthy lifestyle. Though it might seem counter-intuitive, vigorous exercise can help you feel more energetic once you get used to it. But exercising too much can cause fatigue, so talk to your provider about what’s best for you.

## 2023-04-26 NOTE — PROGRESS NOTE ADULT - SUBJECTIVE AND OBJECTIVE BOX
Gastroenterology/Hepatology Progress Note      Interval Events:     Pt. reported she had a yellow stool this morning. Denied abd pain, nausea and vomiting. No fevers and chills. Tolerating po diet well.     Allergies:  No Known Allergies      Hospital Medications:  acetaminophen     Tablet .. 650 milliGRAM(s) Oral every 6 hours PRN  aluminum hydroxide/magnesium hydroxide/simethicone Suspension 30 milliLiter(s) Oral every 4 hours PRN  atorvastatin 40 milliGRAM(s) Oral at bedtime  buMETAnide 1 milliGRAM(s) Oral two times a day  dextrose 5%. 1000 milliLiter(s) IV Continuous <Continuous>  dextrose 5%. 1000 milliLiter(s) IV Continuous <Continuous>  dextrose 50% Injectable 25 Gram(s) IV Push once  dextrose 50% Injectable 12.5 Gram(s) IV Push once  dextrose 50% Injectable 25 Gram(s) IV Push once  dextrose Oral Gel 15 Gram(s) Oral once PRN  glucagon  Injectable 1 milliGRAM(s) IntraMuscular once  hydrALAZINE 50 milliGRAM(s) Oral every 8 hours  insulin lispro (ADMELOG) corrective regimen sliding scale   SubCutaneous three times a day before meals  insulin lispro (ADMELOG) corrective regimen sliding scale   SubCutaneous at bedtime  levothyroxine 125 MICROGram(s) Oral daily  melatonin 3 milliGRAM(s) Oral at bedtime PRN  ondansetron Injectable 4 milliGRAM(s) IV Push every 8 hours PRN  pantoprazole  Injectable 40 milliGRAM(s) IV Push two times a day  pantoprazole  Injectable          ROS: 14 point ROS negative unless otherwise state in subjective    PHYSICAL EXAM:   Vital Signs:  Vital Signs Last 24 Hrs  T(C): 36.3 (26 Apr 2023 06:00), Max: 36.6 (25 Apr 2023 21:21)  T(F): 97.4 (26 Apr 2023 06:00), Max: 97.9 (25 Apr 2023 21:21)  HR: 61 (26 Apr 2023 06:00) (60 - 69)  BP: 155/73 (26 Apr 2023 06:00) (127/60 - 155/73)  BP(mean): --  RR: 18 (26 Apr 2023 06:00) (18 - 20)  SpO2: 100% (26 Apr 2023 06:00) (100% - 100%)    Parameters below as of 26 Apr 2023 06:00  Patient On (Oxygen Delivery Method): room air      Daily     Daily     PHYSICAL EXAM:     GENERAL:  No acute distress, lying in bed, sleeping.   HEENT:  NCAT, no scleral icterus   CHEST:  no respiratory distress  ABDOMEN:  Soft, non-tender, non-distended, no masses  NEURO:  Alert and oriented x 3, no tremors    LABS:                        7.7    5.58  )-----------( 221      ( 26 Apr 2023 06:02 )             24.1     Mean Cell Volume: 79.3 fL (04-26-23 @ 06:02)    04-26    138  |  101  |  59<H>  ----------------------------<  88  4.1   |  22  |  1.87<H>    Ca    9.4      26 Apr 2023 06:02  Phos  4.2     04-26  Mg     1.70     04-26    TPro  6.8  /  Alb  3.4  /  TBili  0.4  /  DBili  x   /  AST  29  /  ALT  35<H>  /  AlkPhos  575<H>  04-26    LIVER FUNCTIONS - ( 26 Apr 2023 06:02 )  Alb: 3.4 g/dL / Pro: 6.8 g/dL / ALK PHOS: 575 U/L / ALT: 35 U/L / AST: 29 U/L / GGT: x           PT/INR - ( 24 Apr 2023 17:32 )   PT: 14.1 sec;   INR: 1.21 ratio         PTT - ( 24 Apr 2023 17:32 )  PTT:44.4 sec          Imaging:          US abdomen on 2/26/23      FINDINGS:  Liver: Increased echogenicity.  Bile ducts: Normal caliber. Common bile duct measures 6 mm.  Gallbladder: Numerous small mobile gallstones occupying the dependent   gallbladder lumen. No gallbladder wall thickening or pericholecystic   fluid. Negative Reeder sign.  Pancreas: Visualized portions are within normal limits.  Right kidney: 9.9 cm. No hydronephrosis.  Ascites: Mild ascites.  IVC: Visualized portions are within normal limits.    Additional findings: Partial imaged right pleural effusion.    IMPRESSION:    1. Mildly increased echogenicity of the liver suggestive of hepatic   steatosis. No biliary ductal dilatation.  2. Cholelithiasis without evidence of cholecystitis.  3. Partially imaged right pleural effusion.  4. Mild ascites.    CT A/P on 1/18/23    PROCEDURE:  CT of the Abdomen and Pelvis was performed.  Sagittal and coronal reformats were performed.    FINDINGS:  LOWER CHEST: Patchy airspace opacity in both lung bases, increased   compared to prior exam. Bilateral consolidation, left greater than right.   Moderate right and small left pleural effusion.    LIVER: Within normal limits.  BILE DUCTS: Normal caliber.  GALLBLADDER: Cholelithiasis.  SPLEEN: Within normal limits.  PANCREAS: Within normal limits.  ADRENALS: Within normal limits.  KIDNEYS/URETERS: Within normal limits.    BLADDER: Excreted contrast in the bladder.  REPRODUCTIVE ORGANS: Small calcified uterine myoma. Bilateral adnexa   within normal limits.    BOWEL: No bowel obstruction. Appendix is normal.  PERITONEUM: No ascites.  VESSELS: Atherosclerotic calcifications.  RETROPERITONEUM/LYMPH NODES: No lymphadenopathy. No retroperitoneal   hematoma.  ABDOMINAL WALL: Within normal limits.  BONES: Within normal limits.    IMPRESSION:  *  No evidence of retroperitoneal or intra-abdominal hematoma.  *  Increasing basilar airspace opacity compared to prior examination with   dense consolidation in both lower lobes.  *  Right greater than left pleural effusions, not significantly changed.      
Shanita Gonzalez, PGY1  Internal Medicine    Patient is a 57y old  Female who presents with a chief complaint of Worsening Anemia (25 Apr 2023 10:28)      SUBJECTIVE/INTERVAL EVENTS: NAEON. Denies any current abdominal pain. States her fatigue is improved. Denies chills/fevers. Wants to go home.     MEDICATIONS  (STANDING):  atorvastatin 40 milliGRAM(s) Oral at bedtime  buMETAnide 1 milliGRAM(s) Oral two times a day  dextrose 5%. 1000 milliLiter(s) (100 mL/Hr) IV Continuous <Continuous>  dextrose 5%. 1000 milliLiter(s) (50 mL/Hr) IV Continuous <Continuous>  dextrose 50% Injectable 12.5 Gram(s) IV Push once  dextrose 50% Injectable 25 Gram(s) IV Push once  dextrose 50% Injectable 25 Gram(s) IV Push once  glucagon  Injectable 1 milliGRAM(s) IntraMuscular once  hydrALAZINE 50 milliGRAM(s) Oral every 8 hours  insulin lispro (ADMELOG) corrective regimen sliding scale   SubCutaneous three times a day before meals  insulin lispro (ADMELOG) corrective regimen sliding scale   SubCutaneous at bedtime  levothyroxine 125 MICROGram(s) Oral daily  pantoprazole  Injectable 40 milliGRAM(s) IV Push two times a day  pantoprazole  Injectable        MEDICATIONS  (PRN):  acetaminophen     Tablet .. 650 milliGRAM(s) Oral every 6 hours PRN Temp greater or equal to 38C (100.4F), Mild Pain (1 - 3)  aluminum hydroxide/magnesium hydroxide/simethicone Suspension 30 milliLiter(s) Oral every 4 hours PRN Dyspepsia  dextrose Oral Gel 15 Gram(s) Oral once PRN Blood Glucose LESS THAN 70 milliGRAM(s)/deciliter  melatonin 3 milliGRAM(s) Oral at bedtime PRN Insomnia  ondansetron Injectable 4 milliGRAM(s) IV Push every 8 hours PRN Nausea and/or Vomiting      VITAL SIGNS:  T(F): 97.4 (04-26-23 @ 06:00), Max: 97.9 (04-25-23 @ 21:21)  HR: 61 (04-26-23 @ 06:00) (60 - 69)  BP: 155/73 (04-26-23 @ 06:00) (127/60 - 155/73)  RR: 18 (04-26-23 @ 06:00) (18 - 20)  SpO2: 100% (04-26-23 @ 06:00) (100% - 100%)    I&O's Summary    Daily     Daily     PHYSICAL EXAM:  Gen: Alert, NAD  HEENT: NCAT, conjunctiva clear, sclera anicteric, no erythema or exudates in the oropharynx, mmm  Neck: Supple, no JVD  CV: RRR, S1S2, no m/r/g  Resp: CTAB, normal respiratory effort  Abd: Soft, nontender, nondistended, normal bowel sounds  Ext: no edema, no clubbing or cyanosis  Neuro: AOx3, CN2-12 grossly intact, MARTINEZ  SKIN: warm, perfused    LABS:                        7.7    5.58  )-----------( 221      ( 26 Apr 2023 06:02 )             24.1     Hgb Trend: 7.7<--, 7.2<--, 7.1<--, 7.1<--, 6.3<--  04-26    138  |  101  |  59<H>  ----------------------------<  88  4.1   |  22  |  1.87<H>    Ca    9.4      26 Apr 2023 06:02  Phos  4.2     04-26  Mg     1.70     04-26    TPro  6.8  /  Alb  3.4  /  TBili  0.4  /  DBili  x   /  AST  29  /  ALT  35<H>  /  AlkPhos  575<H>  04-26    Creatinine Trend: 1.87<--, 1.92<--, 1.98<--  LIVER FUNCTIONS - ( 26 Apr 2023 06:02 )  Alb: 3.4 g/dL / Pro: 6.8 g/dL / ALK PHOS: 575 U/L / ALT: 35 U/L / AST: 29 U/L / GGT: x           PT/INR - ( 24 Apr 2023 17:32 )   PT: 14.1 sec;   INR: 1.21 ratio         PTT - ( 24 Apr 2023 17:32 )  PTT:44.4 sec          CAPILLARY BLOOD GLUCOSE      POCT Blood Glucose.: 150 mg/dL (25 Apr 2023 22:29)  POCT Blood Glucose.: 199 mg/dL (25 Apr 2023 18:05)  POCT Blood Glucose.: 85 mg/dL (25 Apr 2023 13:15)      RADIOLOGY & ADDITIONAL TESTS: Reviewed    Imaging Personally Reviewed:    Consultant(s) Notes Reviewed:      Care Discussed with Consultants/Other Providers:  
Shanita Gonzalez, PGY1  Internal Medicine    Patient is a 57y old  Female who presents with a chief complaint of Worsening Anemia (25 Apr 2023 06:43)      SUBJECTIVE/INTERVAL EVENTS: Patient seen and evaluated at bedside this morning. Patient endorses having dark bowel movements. Patient states that she does not have any SOB. Has some fatigue, worse from her baseline.     MEDICATIONS  (STANDING):  atorvastatin 40 milliGRAM(s) Oral at bedtime  buMETAnide 1 milliGRAM(s) Oral two times a day  dextrose 5%. 1000 milliLiter(s) (100 mL/Hr) IV Continuous <Continuous>  dextrose 5%. 1000 milliLiter(s) (50 mL/Hr) IV Continuous <Continuous>  dextrose 50% Injectable 12.5 Gram(s) IV Push once  dextrose 50% Injectable 25 Gram(s) IV Push once  dextrose 50% Injectable 25 Gram(s) IV Push once  glucagon  Injectable 1 milliGRAM(s) IntraMuscular once  hydrALAZINE 50 milliGRAM(s) Oral every 8 hours  insulin lispro (ADMELOG) corrective regimen sliding scale   SubCutaneous every 6 hours  levothyroxine 125 MICROGram(s) Oral daily  pantoprazole  Injectable 40 milliGRAM(s) IV Push two times a day  pantoprazole  Injectable        MEDICATIONS  (PRN):  acetaminophen     Tablet .. 650 milliGRAM(s) Oral every 6 hours PRN Temp greater or equal to 38C (100.4F), Mild Pain (1 - 3)  aluminum hydroxide/magnesium hydroxide/simethicone Suspension 30 milliLiter(s) Oral every 4 hours PRN Dyspepsia  dextrose Oral Gel 15 Gram(s) Oral once PRN Blood Glucose LESS THAN 70 milliGRAM(s)/deciliter  melatonin 3 milliGRAM(s) Oral at bedtime PRN Insomnia  ondansetron Injectable 4 milliGRAM(s) IV Push every 8 hours PRN Nausea and/or Vomiting      VITAL SIGNS:  T(F): 97.7 (04-25-23 @ 06:14), Max: 97.7 (04-25-23 @ 06:14)  HR: 58 (04-25-23 @ 06:14) (54 - 66)  BP: 152/53 (04-25-23 @ 06:14) (152/53 - 168/72)  RR: 20 (04-25-23 @ 06:14) (16 - 20)  SpO2: 100% (04-25-23 @ 06:14) (100% - 100%)    I&O's Summary    Daily Height in cm: 157.48 (25 Apr 2023 01:37)    Daily     PHYSICAL EXAM:  Gen: Alert, NAD  HEENT: NCAT, conjunctiva clear, sclera anicteric, no erythema or exudates in the oropharynx, mmm  Neck: Supple, no JVD  CV: RRR, S1S2, no m/r/g  Resp: CTAB, normal respiratory effort  Abd: Soft, nontender, nondistended, normal bowel sounds  Ext: no edema, no clubbing or cyanosis  Neuro: AOx3, CN2-12 grossly intact, MARTINEZ  SKIN: warm, perfused    LABS:                        7.1    4.75  )-----------( 203      ( 25 Apr 2023 03:30 )             22.0     Hgb Trend: 7.1<--, 6.3<--, 6.8<--  04-24    133<L>  |  99  |  67<H>  ----------------------------<  444<H>  5.0   |  21<L>  |  1.98<H>    Ca    9.3      24 Apr 2023 17:32    TPro  6.8  /  Alb  3.6  /  TBili  0.3  /  DBili  x   /  AST  34<H>  /  ALT  44<H>  /  AlkPhos  612<H>  04-24    Creatinine Trend: 1.98<--  LIVER FUNCTIONS - ( 24 Apr 2023 17:32 )  Alb: 3.6 g/dL / Pro: 6.8 g/dL / ALK PHOS: 612 U/L / ALT: 44 U/L / AST: 34 U/L / GGT: x           PT/INR - ( 24 Apr 2023 17:32 )   PT: 14.1 sec;   INR: 1.21 ratio         PTT - ( 24 Apr 2023 17:32 )  PTT:44.4 sec          CAPILLARY BLOOD GLUCOSE      POCT Blood Glucose.: 101 mg/dL (25 Apr 2023 07:21)  POCT Blood Glucose.: 201 mg/dL (25 Apr 2023 01:33)  POCT Blood Glucose.: 230 mg/dL (25 Apr 2023 00:08)  POCT Blood Glucose.: 327 mg/dL (24 Apr 2023 22:09)  POCT Blood Glucose.: 454 mg/dL (24 Apr 2023 19:59)  POCT Blood Glucose.: 447 mg/dL (24 Apr 2023 19:10)  POCT Blood Glucose.: 425 mg/dL (24 Apr 2023 16:08)      RADIOLOGY & ADDITIONAL TESTS: Reviewed    Imaging Personally Reviewed:    Consultant(s) Notes Reviewed:      Care Discussed with Consultants/Other Providers:

## 2023-04-26 NOTE — DISCHARGE NOTE PROVIDER - NSDCMRMEDTOKEN_GEN_ALL_CORE_FT
atorvastatin 40 mg oral tablet: 1 tab(s) orally once a day (at bedtime)  bumetanide 1 mg oral tablet: 1 tab(s) orally 2 times a day  hydrALAZINE 50 mg oral tablet: 1 tab(s) orally every 8 hours  Januvia 25 mg oral tablet: 1 tab(s) orally once a day   levothyroxine 125 mcg (0.125 mg) oral tablet: 1 tab(s) orally once a day  omeprazole 20 mg oral delayed release tablet: 1 tab(s) orally once a day  Prandin 1 mg oral tablet: 0.5 tab(s) orally 3 times a day (before meals) (Pharmacy filled for 1 tablet PO TID).

## 2023-04-26 NOTE — DISCHARGE NOTE PROVIDER - CARE PROVIDER_API CALL
Bryan Fernandez; MD)  Medicine  [address] [place] [address]  Phone: [phone]  Fax: [phone]  Established Patient  Follow Up Time: 1-3 days

## 2023-04-26 NOTE — DISCHARGE NOTE PROVIDER - HOSPITAL COURSE
This is a 57F with history of DM2, HTN, HLD, CKD (Most recently CKD 3-4), Hypothyroidism (recent admission for myxedema coma), Pulmonary Arterial Hypertension, and Asthma who presents for low Hgb as an outpatient. Patient was recently admitted at [facility] initially in January for myxedema coma requiring ICU stay and then in late February for ADHF. Was discharged on march 1st and said that she was doing well at home. Said that she still has some MARKS but its better than prior to her recent admission. Otherwise denies any significant chest pain, palpitations, syncope, or worsening SOB with exertion. Denies any known bleeding episodes but states that her BMs have been dark at times (though most recent BM was brown in color). No other acute complaints. Patient states she had blood work with her PCP last week that resulted yesterday and was told by her PCP that her Hgb was 6.5 and that she should come to the hospital for a blood transfusion. Patient reports having a colonoscopy early last year but does not know the results, does state that she was supposed to have a repeat colonoscopy but was not able to schedule it.     On arrival to the ED, her vitals were T 97.6, P 66, /72, RR 16, O2 sat 100% RA. Her lab work showed anemia with stable CKD and mild transaminitis. Her CXR showed unchanged bilateral scattered and hazy reticular opacities. She was given admelog 4U and PRBC 1U (second unit held as per temp had dropped when she arrived to the [facility]). She was admitted to medicine.    Ms. Shila Hernandez is a 57 year old female with a past medical history of DM2, HTN, HLD, CKD (baseline CKD 3-4), Hypothyroidism (recent admission for myxedema coma in Jan 2023), Pulmonary Arterial Hypertension, and Asthma who presents for further treatment and evaluation after being referred to come into the hospital by her PCP for a low hemoglobin (6.5) as as an outpatient. Patient was recently admitted at [facility] initially in January for myxedema coma requiring ICU stay and then in late February 2023 for ADHF. Was discharged 3/1/2023 and said that she was doing well at home. Said that she still has some MARKS but improved prior to her recent admission. Otherwise denies any significant chest pain, palpitations, syncope, or worsening SOB with exertion. Denies any known bleeding episodes but states that her BMs have been dark at times (though most recent BM was brown in color). No other acute complaints. Patient reports having a colonoscopy early last year but does not know the results, does state that she was supposed to have a repeat colonoscopy but was not able to schedule it.     On arrival to the ED, her vitals were T 97.6, P 66, /72, RR 16, O2 sat 100% RA. Her lab work showed anemia with stable CKD and mild transaminitis. Her CXR showed unchanged bilateral scattered and hazy reticular opacities. She was found to have in the ED initially a hemoglobin of 6.3. Then, she was transfused with one unit of packed red blood cells. Her hemoglobin was then trended. Results showed her hemoglobin increased to 7.1. --> 7.2. --> 7.7. Patient was seen by gastroenterology for the dark stools, given that her stools were unlikely to be melena and she had a colonoscopy performed 1 year ago at outside hospital, she was advised to go home and follow up outpatient.     Patient was medically stable for discharge home with close follow up with her outpatient providers: primary care physician and her pulmonologist. Ms. Shila Hernandez is a 57 year old female with a past medical history of DM2, HTN, HLD, CKD (baseline CKD 3-4), Hypothyroidism (recent admission for myxedema coma in Jan 2023), Pulmonary Arterial Hypertension, and Asthma who presents for further treatment and evaluation after being referred to come into the hospital by her PCP for a low hemoglobin (6.5) as as an outpatient. Patient was recently admitted at [facility] initially in January for myxedema coma requiring ICU stay and then in late February 2023 for ADHF. Was discharged 3/1/2023 and said that she was doing well at home. Said that she still has some MARKS but improved prior to her recent admission. Otherwise denies any significant chest pain, palpitations, syncope, or worsening SOB with exertion. Denies any known bleeding episodes but states that her BMs have been dark at times (though most recent BM was brown in color). No other acute complaints. Patient reports having a colonoscopy early last year but does not know the results, does state that she was supposed to have a repeat colonoscopy but was not able to schedule it.     On arrival to the ED, her vitals were T 97.6, P 66, /72, RR 16, O2 sat 100% RA. Her lab work showed anemia with stable CKD and mild transaminitis. Her CXR showed unchanged bilateral scattered and hazy reticular opacities. She was found to have in the ED initially a hemoglobin of 6.3. Then, she was transfused with one unit of packed red blood cells. Her hemoglobin was then trended. Results showed her hemoglobin increased to 7.1. --> 7.2. --> 7.7. Patient was seen by gastroenterology for the dark stools, given that her stools were unlikely to be melena and she had a colonoscopy performed 1 year ago at outside hospital. Per GI, they believe her chronic anemia likely related to ACD given her normal iron studies (patient also had elevated ferritin and CKD), they deferred endoscopic evaluation at this time given no overt signs of GI bleeding, risks outweigh benefits.     Patient was medically stable for discharge home with close follow up with her outpatient providers: her primary care physician, her endocrinologist, her cardiologist, and her pulmonologist. Ms. Shila Hernandez is a 57 year old female with a past medical history of DM2, HTN, HLD, CKD (baseline CKD 3-4), Hypothyroidism (recent admission for myxedema coma in Jan 2023), Pulmonary Arterial Hypertension, and Asthma who presents for further treatment and evaluation after being referred to come into the hospital by her PCP for a low hemoglobin (6.5) as as an outpatient. Patient was recently admitted at [facility] initially in January for myxedema coma requiring ICU stay and then in late February 2023 for ADHF. Was discharged 3/1/2023 and said that she was doing well at home. Said that she still has some MARKS but improved prior to her recent admission. Otherwise denies any significant chest pain, palpitations, syncope, or worsening SOB with exertion. Denies any known bleeding episodes but states that her BMs have been dark at times (though most recent BM was brown in color). No other acute complaints. Patient reports having a colonoscopy early last year but does not know the results, does state that she was supposed to have a repeat colonoscopy but was not able to schedule it.     On arrival to the ED, her vitals were T 97.6, P 66, /72, RR 16, O2 sat 100% RA. Her lab work showed anemia with stable CKD and mild transaminitis. Her CXR showed unchanged bilateral scattered and hazy reticular opacities. She was found to have in the ED initially a hemoglobin of 6.3. Then, she was transfused with one unit of packed red blood cells. Her hemoglobin was then trended. Results showed her hemoglobin increased to 7.1. --> 7.2. --> 7.7. Patient was seen by gastroenterology for the dark stools, given that her stools were unlikely to be melena and she had a colonoscopy performed 1 year ago at outside hospital. Per GI, they believe her chronic anemia likely related to ACD given her normal iron studies (patient also had elevated ferritin and CKD), they deferred endoscopic evaluation at this time given no overt signs of GI bleeding, risks outweigh benefits.     PT also saw patient and said no skilled PT needs.    Patient was medically stable for discharge home with close follow up with her outpatient providers: her primary care physician, her endocrinologist, her cardiologist, and her pulmonologist. OUTSTANDING ISSUES AT DISCHARGE  #Anemia of Chronic Disease  [ ] FYI: Discharge Hemoglobin 7.7  [ ] Pt has history of abnormal colonoscopy at [facility], but no evidence of GIB here. Consider reconnecting her with GI for EGD/Colonoscopy as OP.  [ ] Pt received 1u PRBC during admission, responded appropriately & hemoglobin stable.    HOSPITAL COURSE  Ms. Shila Hernandez is a 57 year old female with a past medical history of DM2, HTN, HLD, CKD (baseline CKD 3-4), Hypothyroidism (recent admission for myxedema coma in Jan 2023), Pulmonary Arterial Hypertension, and Asthma who presents for further treatment and evaluation after being referred to come into the hospital by her PCP for a low hemoglobin (6.5) as as an outpatient. Patient was recently admitted at [facility] initially in January for myxedema coma requiring ICU stay and then in late February 2023 for ADHF. Was discharged 3/1/2023 and said that she was doing well at home. Said that she still has some MARKS but improved prior to her recent admission. Otherwise denies any significant chest pain, palpitations, syncope. Denies any known bleeding episodes but states that her BMs have been dark (NOT Melanic). No other acute complaints. Patient reports having a colonoscopy early last year but does not know the results, does state that she was supposed to have a repeat colonoscopy but was not able to schedule it.     She was evaluated by Gastroenterology inpatient for consideration of possible GI bleeding in the context of her self-reporting dark stools; however, she did not have melena and her hemoglobin responded well to 1u PRBC, so there was no strong indication for inpatient colonoscopy / endoscopy given that she was approximately at her baseline anemia and symptomatology. She should still have an EGD/Colonoscopy as an outpatient to follow-up on the abnormal colonoscopy from last year.    Patient was medically stable for discharge home with close follow up with her outpatient providers: her primary care physician, her endocrinologist, her cardiologist, and her pulmonologist.

## 2023-04-26 NOTE — PROGRESS NOTE ADULT - PROBLEM SELECTOR PLAN 4
- Poorly controlled DM2, patient recently had her long acting insulin stopped on her recent admission due to hypoglycemia, patient and family also report her having issues with hypoglycemia at home though FS seem to be variable (pt states FS range from 80s to 300s in AM)  - Patient reports poor dietary compliance which likely is playing some role in her hyperglycemia  - Monitor FS, will place on low dose ISS for now  - Readjust home medications as needed
- Poorly controlled DM2, patient recently had her long acting insulin stopped on her recent admission due to hypoglycemia, patient and family also report her having issues with hypoglycemia at home though FS seem to be variable (pt states FS range from 80s to 300s in AM)  - Patient reports poor dietary compliance which likely is playing some role in her hyperglycemia  - Monitor FS, will place on low dose ISS for now  - Readjust home medications as needed

## 2023-04-26 NOTE — PROGRESS NOTE ADULT - PROBLEM SELECTOR PLAN 2
- Suspect multifactorial from her anemia, recent myxedema coma, recent admissions  - Improving at home  - Obtain PT melanieal
- Suspect multifactorial from her anemia, recent myxedema coma, recent admissions  - Improving at home  - Obtain PT melanieal

## 2023-04-26 NOTE — PROGRESS NOTE ADULT - PROBLEM SELECTOR PLAN 1
- Worsening anemia, unclear on cause, possibly 2/2 CKD vs AOCD vs GI losses vs BM suppression from recent thyroid issues vs other  - Iron studies more suggestive of AOCD with nl iron/TIBC and elevated ferritin  - Lower suspicion of acute GI losses but given reports of dark BMs recently would want to obtain GI eval -> place on pantoprazole 40mg IVP BID, NPO for now  - Check retic count to assess for BM issues (add on to initial lab work)  - If no GI work up as inpatient then likely outpatient follow up for repeat colonoscopy/?EGD  - Transfuse as needed, patient seems to have responded well to her 1U pRBC transfusion
- Worsening anemia, unclear on cause, possibly 2/2 CKD vs AOCD vs GI losses vs BM suppression from recent thyroid issues vs other  - Iron studies more suggestive of AOCD with nl iron/TIBC and elevated ferritin  - Lower suspicion of acute GI losses but given reports of dark BMs recently would want to obtain GI eval -> place on pantoprazole 40mg IVP BID, NPO for now  - Check retic count to assess for BM issues (add on to initial lab work)  - If no GI work up as inpatient then likely outpatient follow up for repeat colonoscopy/?EGD  - Transfuse as needed, patient seems to have responded well to her 1U pRBC transfusion

## 2023-05-22 ENCOUNTER — APPOINTMENT (OUTPATIENT)
Dept: CARDIOLOGY | Facility: CLINIC | Age: 58
End: 2023-05-22
Payer: COMMERCIAL

## 2023-05-22 VITALS
SYSTOLIC BLOOD PRESSURE: 166 MMHG | HEIGHT: 62 IN | OXYGEN SATURATION: 96 % | BODY MASS INDEX: 22.08 KG/M2 | HEART RATE: 80 BPM | WEIGHT: 120 LBS | DIASTOLIC BLOOD PRESSURE: 68 MMHG

## 2023-05-22 PROCEDURE — 99214 OFFICE O/P EST MOD 30 MIN: CPT

## 2023-05-22 NOTE — PHYSICAL EXAM
[Normal Conjunctiva] : normal conjunctiva [No Carotid Bruit] : no carotid bruit [Normal S1, S2] : normal S1, S2 [Soft] : abdomen soft [Non Tender] : non-tender [Normal Bowel Sounds] : normal bowel sounds [Normal Gait] : normal gait [Normal] : alert and oriented, normal memory [de-identified] : bilateral 1+ pitting edema.

## 2023-05-22 NOTE — REVIEW OF SYSTEMS
[Dyspnea on exertion] : dyspnea during exertion [Joint Pain] : joint pain [Negative] : Respiratory [Fever] : no fever [Headache] : no headache [Chills] : no chills [Feeling Fatigued] : not feeling fatigued [Blurry Vision] : no blurred vision [Chest Discomfort] : no chest discomfort [Lower Ext Edema] : no extremity edema [Palpitations] : no palpitations [Orthopnea] : no orthopnea [PND] : no PND [Abdominal Pain] : no abdominal pain [Nausea] : no nausea [Vomiting] : no vomiting [Heartburn] : no heartburn [Rash] : no rash [Itching] : no itching [Dizziness] : no dizziness [Tremor] : no tremor was seen [Numbness (Hypoesthesia)] : no numbness [Tingling (Paresthesia)] : no tingling [Confusion] : no confusion was observed [Anxiety] : no anxiety [Under Stress] : not under stress [Easy Bleeding] : no tendency for easy bleeding [Easy Bruising] : no tendency for easy bruising

## 2023-05-22 NOTE — DISCUSSION/SUMMARY
[FreeTextEntry1] : In a summary Shila Coley is a middle aged female with Hypertension, systolic BP high. Continue Hydralazine and cut down on salt intake.  Bilateral leg edema, continue Bumetanide. Elevate legs. Hypercholesterolemia, continue Atorvastatin and low cholesterol diet. Diabetes, on medication and low Carb diet.  Continue follow up with Pulmonary and Nephrology.

## 2023-05-22 NOTE — HISTORY OF PRESENT ILLNESS
[FreeTextEntry1] : Shila Coley is a 57 year old female with history of Hypertension, hypercholesterolemia, Diabetes, Hypothyroidism, chronic kidney disease and Pulmonary hypertension comes for follow up visit. Admitted to [facility] in 12/2022 for Myxedema coma and was treated. In 2/2023 was admitted to [facility] again with marked bradycardia , HR in 20' s secondary to Labetalol and improved. Had right heart cath which showed moderate pulmonary hypertension. Follows up with Pulmonary. Denies any chest pain or palpitations. On and off bilateral edema of feet . Mild shortness of breath on exertion which is improved with rest and chronic. Follows up with Nephrologist for CKD. Came with daughter. Recently seen by another Cardiologist where she had Echo. Recently admitted to [facility] for anemia and received transfusion.

## 2023-06-13 ENCOUNTER — APPOINTMENT (OUTPATIENT)
Dept: PULMONOLOGY | Facility: CLINIC | Age: 58
End: 2023-06-13
Payer: COMMERCIAL

## 2023-06-13 VITALS
HEART RATE: 68 BPM | HEIGHT: 62 IN | RESPIRATION RATE: 15 BRPM | SYSTOLIC BLOOD PRESSURE: 152 MMHG | OXYGEN SATURATION: 96 % | TEMPERATURE: 97 F | DIASTOLIC BLOOD PRESSURE: 71 MMHG | BODY MASS INDEX: 22.26 KG/M2 | WEIGHT: 121 LBS

## 2023-06-13 DIAGNOSIS — J90 PLEURAL EFFUSION, NOT ELSEWHERE CLASSIFIED: ICD-10-CM

## 2023-06-13 DIAGNOSIS — I50.32 CHRONIC DIASTOLIC (CONGESTIVE) HEART FAILURE: ICD-10-CM

## 2023-06-13 DIAGNOSIS — I27.20 PULMONARY HYPERTENSION, UNSPECIFIED: ICD-10-CM

## 2023-06-13 PROCEDURE — 99213 OFFICE O/P EST LOW 20 MIN: CPT

## 2023-06-14 PROBLEM — I27.20 PULMONARY HYPERTENSION: Status: ACTIVE | Noted: 2023-02-08

## 2023-06-14 PROBLEM — J90 PLEURAL EFFUSION: Status: ACTIVE | Noted: 2023-02-08

## 2023-06-14 PROBLEM — I50.32 CHRONIC DIASTOLIC HEART FAILURE: Status: ACTIVE | Noted: 2023-02-08

## 2023-06-14 NOTE — PHYSICAL EXAM
[No Acute Distress] : no acute distress [Normal Oropharynx] : normal oropharynx [Normal Appearance] : normal appearance [Normal S1, S2] : normal s1, s2 [No Murmurs] : no murmurs [No Resp Distress] : no resp distress [Clear to Auscultation Bilaterally] : clear to auscultation bilaterally [No Edema] : no edema [Oriented x3] : oriented x3 [Normal Affect] : normal affect [TextBox_105] : warm

## 2023-06-14 NOTE — HISTORY OF PRESENT ILLNESS
[Never] : never [TextBox_4] : Ms. Hernandez is a 56 yo F w/ PMH of hypothyroidism w/ recent myxedema coma (Hospitalized 12/16/22 to 1/4/23 requiring intubation at [facility]), CKD III, asthma, pulmonary hypertension who presents for follow up. \par \par She had a prior hospitalization for acute hypoxic respiratory failure in the setting of pneumonia, heart failure exacerbation in Jan 2023. During this admission she was found to have pulmonary edema, bilateral pleural effusions, moderate pulmonary hypertension. She underwent RHC - found to have increased wedge pressure, elevated CVP abnd moderate to severe pulm htn. She states she currently takes Bumex bid.\par \par She was again admitted from February 25 to March 1, 2023.  Her most recent admission was due to symptomatic bradycardia.  Her heart rate was in the 20s.  She received atropine and epinephrine.  She was also found to be hypothermic to 90.5 °F.  Her EKG showed first-degree AV block and sinus bradycardia.  Her course was further complicated by hyperkalemia with a K of 5.9 and acute kidney injury.  She was also found to be fluid overloaded and required Bumex IV briefly.  She was treated with intravenous levothyroxine as well given the concern for gut malabsorption.  Bradycardia eventually corrected especially with the discontinuation of home labetalol and correction of electrolyte abnormalities and hypothermia.  \par \par Most recent admission 4/24-26,2023 for   anemia to Hb 6.5. She received transfusion, no clear source of anemia. \par \par Overall she feels well. Not currently short of breath. No current cough. No wheezing. No LE edema currently She denies limitations due to dyspnea. She states she is fully indepdent of all ADLs, IADLs, able to climb stairs, do laundry etc. she has no complaints at this time.  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 She denies history of smoking, dust exposures. She denies history of asthma - though carries the label on her chart. \par  \par

## 2023-06-14 NOTE — ASSESSMENT
[FreeTextEntry1] : Pt had a RHC on prior hospitalization with findings consistent with moderate pulmonary hypertension - likely class II. She has had hospitalizations for fluid overload. Most recent CT chest 1/23 with bilateral effusions, consolidations. Most recently admitted for anemia requiring prBC transfusion \par \par -Pulmonary function test appreciated.  No evidence of obstruction however restrictive ventilatory defect was present along with a reduced DLCO.\par -still pending repeat CT chest noncontrast.  \par -Continue home Bumex.\par - Defer antihypertensive regimen to cardiology.Currently on hydralazine 75 mg po tid\par - We had an extensive discussion regarding the importance of control of her blood pressure and fluid regulation.  She was counseled on the importance of a low-salt, fluid restriction diet.

## 2023-06-20 ENCOUNTER — APPOINTMENT (OUTPATIENT)
Dept: ENDOCRINOLOGY | Facility: CLINIC | Age: 58
End: 2023-06-20
Payer: COMMERCIAL

## 2023-06-20 ENCOUNTER — NON-APPOINTMENT (OUTPATIENT)
Age: 58
End: 2023-06-20

## 2023-06-20 VITALS
BODY MASS INDEX: 22.45 KG/M2 | DIASTOLIC BLOOD PRESSURE: 70 MMHG | HEIGHT: 62 IN | WEIGHT: 122 LBS | SYSTOLIC BLOOD PRESSURE: 112 MMHG | HEART RATE: 82 BPM | OXYGEN SATURATION: 97 %

## 2023-06-20 DIAGNOSIS — E06.3 AUTOIMMUNE THYROIDITIS: ICD-10-CM

## 2023-06-20 DIAGNOSIS — E03.9 HYPOTHYROIDISM, UNSPECIFIED: ICD-10-CM

## 2023-06-20 PROCEDURE — 99214 OFFICE O/P EST MOD 30 MIN: CPT

## 2023-06-20 NOTE — PHYSICAL EXAM
[Alert] : alert [No Acute Distress] : no acute distress [Well Developed] : well developed [No Neck Mass] : no neck mass was observed [Supple] : the neck was supple [Thyroid Not Enlarged] : the thyroid was not enlarged [No Respiratory Distress] : no respiratory distress [Clear to Auscultation] : lungs were clear to auscultation bilaterally [Regular Rhythm] : with a regular rhythm [Pedal Pulses Normal] : the pedal pulses are present [Normal Bowel Sounds] : normal bowel sounds [Not Tender] : non-tender [Soft] : abdomen soft [Oriented x3] : oriented to person, place, and time [Normal Insight/Judgement] : insight and judgment were intact [de-identified] : Systolic murmur, loudest over right upper sternal border [de-identified] : Trace edema to feet [de-identified] : Normal sensation to pinprick. Subjective paresthesias over feet. Patellar, achilles reflex 0 b/l

## 2023-06-20 NOTE — ASSESSMENT
[FreeTextEntry1] : 1. Diabetes Mellitus \par Type: T2DM\par A1c: 6.1 (2/2023)\par Current Regimen: Prandin 1 mg TID before meals\par \par Januvia was stopped by PCP after last visit.\par \par PLAN: \par - Regimen: Continue Prandin 1 mg TID before meals\par      If additional agents needed, can consider adding Tradjenta.\par \par - BG monitoring: Continue fingersticks\par \par - Labs: CMP, A1c, TSH, free T4, fructosamine\par \par - Preventive: \par        Nephropathy screening: urine albumin/creatinine elevated, follows with nephrology\par        Retinopathy screening: Ophthalmologist UTD 2/2023\par        Foot exam/podiatrist: likely beginnings of neuropathy, advised to see podiatrist. Foot exam 3/2/23. \par \par - Counseling: We discussed diabetes foot care, long term complications of diabetes including but not limited to neuropathy, nephropathy, retinopathy and cardiovascular disease and the benefits of good glycemic control in preventing said complications. We discussed the risks and benefits of diabetes medications and/or insulin as relevant for today, prevention and management of hypoglycemia, importance of medication compliance and blood glucose monitoring.\par \par 2. Hypothyroidism: Diagnosed 3/2022. H/o myxedema coma. \par - Continue Levothyroxine 125 mcg daily \par - we discussed the importance of medication hygiene:\par           Take levothyroxine with water on an empty stomach, at least 30 minutes before any food/beverages or other medication intake. \par           Space any iron or calcium containing supplements by at least 4 hours after/before levothyroxine intake. \par - repeat TSH and free T4 \par \par 3. Hypertension\par BP goal <130/90\par Current medications: Hydralazine\par - Continue management and medication titration with PCP\par \par 4. Hyperlipidemia\par Last LDL: 59 (12/2022)\par LDL goal: <70\par - Continue atorvastatin 40 mg daily \par \par Return to clinic in 3 months with NP, 6 months with me. \par \par Reji Mac MD\par [facility]\par Endocrinology at [place] \par [address], [address]\par Ph: [phone]\par Fax: [phone]\par \par

## 2023-06-20 NOTE — HISTORY OF PRESENT ILLNESS
[FreeTextEntry1] : CHIEF COMPLAINT: Hypothyroidism, Type 2 DM \par \par HISTORY OF PRESENTING ILLNESS:\par The patient is a 58 year old F being seen in the office today for evaluation of hypothyroidism and diabetes.\par \par Diagnosed with hypothyroidism while hospitalized (3/2022) with concern for myxedema coma. Has had multiple hospitalization in [facility]. Previously followed with Endocrinologist Dr. Karma Henriquez ([place]). Pt most recently hospitalized at [facility] from 23-3/1/23 due to bradycardia (2/2 labetalol) and chest pain. Endocrinology consulted due to c/f uncontrolled hypothyroidism with h/o myxedema coma and T2DM. TSH 11.5, Free T3 55, Total T4 2.4. \par \par THYROID: \par Takes levothyroxine 125 mcg at 6 AM. Takes all morning medications at the same time with levothyroxine. Waits at least 30 minutes to eat.\par \par Hypo/Hyperthyroid symptoms: \par No constipation or increased frequency of BMs. No weight gain. Notes weight loss due to diuretic use. Endorses cold intolerance. No fatigue. Complaining of decreased appetite. \par No palpitations. No tremors. No anxiety or depression. \par Positive skin (dry) or hair changes (hair loss). No facial or peripheral edema. \par Compressive symptoms: No neck swelling, no dysphagia, no dyspnea. Positive change in voice (lower).\par \par No family history of thyroid disease or thyroid cancer. \par No history of biotin intake. No personal history of radiation exposure in the head and neck area. \par \par DIABETES HPI: \par Type of Diabetes: T2DM\par Duration of Diabetes: 24 years, pt initially diagnosed with GDM in \par \par A1c: 6.1 (2023)\par \par Current home regimen: Repaglinide  1 mg TID\par \par Home medications prior to hospitalization in 2023 include Tresiba 3u QHS, Repaglinide 1 mg TID. A1c 6.1 (2023). Discharged on Januvia 25 mg daily, Repaglinide  0.5 mg TID. Discontinued Tresiba. Discontinued Januvia with PCP. \par \par Notes numbness in feet b/l, blurry vision. S/p cataract surgery\par \par Diabetes complications: \par Microvascular: [x] neuropathy, [-] nephropathy, [?] retinopathy - per pt h/o retinal hemorrhages, unclear HTN vs DM related\par Macrovascular: [-] CAD, [-] CVA, [-] PAD\par \par Last retinopathy screenin2023, noted retinal hemorrhage\par Last nephropathy screening (urine ACR): Urine ACR elevated, 3/2023, follows with nephrologist\par Last foot exam/podiatry visit: Foot exam 3/2/23\par \par BG self monitoring: Fingersticks 3x/day\par BG Trends: Fasting less than 100, postprandial 100-200\par \par Diet: fish, alcantara, pasta, lasagna, basmati diabetic rice\par Exercise: no dedicated exercise\par \par Comorbidities: HTN, HLD, Obesity, CKD, pulmonary HTN, Hypothyroid\par \par PERTINENT LABS: \par \par 3/1/2023 eGFR 30\par \par 2023\par TSH 11.5\par A1c 6.1%\par Total T3 55\par \par 2022\par TPO antibodies positive\par

## 2023-06-21 ENCOUNTER — NON-APPOINTMENT (OUTPATIENT)
Age: 58
End: 2023-06-21

## 2023-06-21 ENCOUNTER — INPATIENT (INPATIENT)
Facility: HOSPITAL | Age: 58
LOS: 1 days | Discharge: HOME | End: 2023-06-23
Attending: INTERNAL MEDICINE | Admitting: INTERNAL MEDICINE
Payer: COMMERCIAL

## 2023-06-21 VITALS
RESPIRATION RATE: 16 BRPM | HEART RATE: 61 BPM | TEMPERATURE: 97 F | SYSTOLIC BLOOD PRESSURE: 163 MMHG | DIASTOLIC BLOOD PRESSURE: 56 MMHG | HEIGHT: 62 IN | WEIGHT: 121.25 LBS | OXYGEN SATURATION: 100 %

## 2023-06-21 DIAGNOSIS — Z98.49 CATARACT EXTRACTION STATUS, UNSPECIFIED EYE: Chronic | ICD-10-CM

## 2023-06-21 LAB
ALBUMIN SERPL ELPH-MCNC: 4 G/DL
ALP BLD-CCNC: 407 U/L
ALT SERPL-CCNC: 48 U/L
ANION GAP SERPL CALC-SCNC: 13 MMOL/L
AST SERPL-CCNC: 33 U/L
BILIRUB SERPL-MCNC: 0.4 MG/DL
BUN SERPL-MCNC: 75 MG/DL
CALCIUM SERPL-MCNC: 9.4 MG/DL
CHLORIDE SERPL-SCNC: 101 MMOL/L
CO2 SERPL-SCNC: 17 MMOL/L
CREAT SERPL-MCNC: 2.29 MG/DL
EGFR: 24 ML/MIN/1.73M2
ESTIMATED AVERAGE GLUCOSE: 157 MG/DL
FRUCTOSAMINE SERPL-MCNC: 367 UMOL/L
GLUCOSE SERPL-MCNC: 288 MG/DL
HBA1C MFR BLD HPLC: 7.1 %
POTASSIUM SERPL-SCNC: 6.4 MMOL/L
PROT SERPL-MCNC: 7.5 G/DL
SODIUM SERPL-SCNC: 132 MMOL/L
T4 FREE SERPL-MCNC: 1.6 NG/DL
TSH SERPL-ACNC: 0.46 UIU/ML

## 2023-06-21 PROCEDURE — 99285 EMERGENCY DEPT VISIT HI MDM: CPT

## 2023-06-22 DIAGNOSIS — E87.1 HYPO-OSMOLALITY AND HYPONATREMIA: ICD-10-CM

## 2023-06-22 DIAGNOSIS — I10 ESSENTIAL (PRIMARY) HYPERTENSION: ICD-10-CM

## 2023-06-22 DIAGNOSIS — E11.9 TYPE 2 DIABETES MELLITUS WITHOUT COMPLICATIONS: ICD-10-CM

## 2023-06-22 DIAGNOSIS — E87.20 ACIDOSIS, UNSPECIFIED: ICD-10-CM

## 2023-06-22 DIAGNOSIS — I27.20 PULMONARY HYPERTENSION, UNSPECIFIED: ICD-10-CM

## 2023-06-22 DIAGNOSIS — J45.909 UNSPECIFIED ASTHMA, UNCOMPLICATED: ICD-10-CM

## 2023-06-22 DIAGNOSIS — Z29.9 ENCOUNTER FOR PROPHYLACTIC MEASURES, UNSPECIFIED: ICD-10-CM

## 2023-06-22 DIAGNOSIS — E78.5 HYPERLIPIDEMIA, UNSPECIFIED: ICD-10-CM

## 2023-06-22 DIAGNOSIS — R50.9 FEVER, UNSPECIFIED: ICD-10-CM

## 2023-06-22 DIAGNOSIS — D64.9 ANEMIA, UNSPECIFIED: ICD-10-CM

## 2023-06-22 DIAGNOSIS — E03.9 HYPOTHYROIDISM, UNSPECIFIED: ICD-10-CM

## 2023-06-22 DIAGNOSIS — E87.5 HYPERKALEMIA: ICD-10-CM

## 2023-06-22 LAB
ALBUMIN SERPL ELPH-MCNC: 3.6 G/DL — SIGNIFICANT CHANGE UP (ref 3.3–5)
ALBUMIN SERPL ELPH-MCNC: 3.9 G/DL — SIGNIFICANT CHANGE UP (ref 3.3–5)
ALBUMIN SERPL ELPH-MCNC: 4.2 G/DL
ALP BLD-CCNC: 425 U/L
ALP SERPL-CCNC: 341 U/L — HIGH (ref 40–120)
ALP SERPL-CCNC: 374 U/L — HIGH (ref 40–120)
ALT FLD-CCNC: 50 U/L — HIGH (ref 4–33)
ALT FLD-CCNC: 59 U/L — HIGH (ref 4–33)
ALT SERPL-CCNC: 63 U/L
ANION GAP SERPL CALC-SCNC: 12 MMOL/L
ANION GAP SERPL CALC-SCNC: 14 MMOL/L — SIGNIFICANT CHANGE UP (ref 7–14)
ANION GAP SERPL CALC-SCNC: 15 MMOL/L — HIGH (ref 7–14)
ANION GAP SERPL CALC-SCNC: 15 MMOL/L — HIGH (ref 7–14)
APPEARANCE UR: CLEAR — SIGNIFICANT CHANGE UP
APTT BLD: 35.2 SEC — SIGNIFICANT CHANGE UP (ref 27–36.3)
AST SERPL-CCNC: 30 U/L — SIGNIFICANT CHANGE UP (ref 4–32)
AST SERPL-CCNC: 51 U/L
AST SERPL-CCNC: 55 U/L — HIGH (ref 4–32)
B PERT DNA SPEC QL NAA+PROBE: SIGNIFICANT CHANGE UP
B PERT+PARAPERT DNA PNL SPEC NAA+PROBE: SIGNIFICANT CHANGE UP
BACTERIA # UR AUTO: NEGATIVE — SIGNIFICANT CHANGE UP
BASE EXCESS BLDV CALC-SCNC: -7.4 MMOL/L — LOW (ref -2–3)
BASE EXCESS BLDV CALC-SCNC: -7.5 MMOL/L — LOW (ref -2–3)
BASOPHILS # BLD AUTO: 0.03 K/UL — SIGNIFICANT CHANGE UP (ref 0–0.2)
BASOPHILS NFR BLD AUTO: 0.5 % — SIGNIFICANT CHANGE UP (ref 0–2)
BILIRUB SERPL-MCNC: 0.5 MG/DL
BILIRUB SERPL-MCNC: 0.5 MG/DL — SIGNIFICANT CHANGE UP (ref 0.2–1.2)
BILIRUB SERPL-MCNC: 0.8 MG/DL — SIGNIFICANT CHANGE UP (ref 0.2–1.2)
BILIRUB UR-MCNC: NEGATIVE — SIGNIFICANT CHANGE UP
BLD GP AB SCN SERPL QL: NEGATIVE — SIGNIFICANT CHANGE UP
BLOOD GAS VENOUS COMPREHENSIVE RESULT: SIGNIFICANT CHANGE UP
BORDETELLA PARAPERTUSSIS (RAPRVP): SIGNIFICANT CHANGE UP
BUN SERPL-MCNC: 71 MG/DL — HIGH (ref 7–23)
BUN SERPL-MCNC: 72 MG/DL — HIGH (ref 7–23)
BUN SERPL-MCNC: 73 MG/DL — HIGH (ref 7–23)
BUN SERPL-MCNC: 77 MG/DL
C PNEUM DNA SPEC QL NAA+PROBE: SIGNIFICANT CHANGE UP
CA-I SERPL-SCNC: 1.25 MMOL/L — SIGNIFICANT CHANGE UP (ref 1.15–1.33)
CALCIUM SERPL-MCNC: 8.6 MG/DL — SIGNIFICANT CHANGE UP (ref 8.4–10.5)
CALCIUM SERPL-MCNC: 8.9 MG/DL — SIGNIFICANT CHANGE UP (ref 8.4–10.5)
CALCIUM SERPL-MCNC: 9 MG/DL — SIGNIFICANT CHANGE UP (ref 8.4–10.5)
CALCIUM SERPL-MCNC: 9.1 MG/DL
CHLORIDE BLDV-SCNC: 105 MMOL/L — SIGNIFICANT CHANGE UP (ref 96–108)
CHLORIDE BLDV-SCNC: 105 MMOL/L — SIGNIFICANT CHANGE UP (ref 96–108)
CHLORIDE SERPL-SCNC: 100 MMOL/L — SIGNIFICANT CHANGE UP (ref 98–107)
CHLORIDE SERPL-SCNC: 101 MMOL/L
CHLORIDE SERPL-SCNC: 98 MMOL/L — SIGNIFICANT CHANGE UP (ref 98–107)
CHLORIDE SERPL-SCNC: 98 MMOL/L — SIGNIFICANT CHANGE UP (ref 98–107)
CO2 BLDV-SCNC: 19.1 MMOL/L — LOW (ref 22–26)
CO2 BLDV-SCNC: 20.1 MMOL/L — LOW (ref 22–26)
CO2 SERPL-SCNC: 16 MMOL/L — LOW (ref 22–31)
CO2 SERPL-SCNC: 17 MMOL/L
CO2 SERPL-SCNC: 17 MMOL/L — LOW (ref 22–31)
CO2 SERPL-SCNC: 17 MMOL/L — LOW (ref 22–31)
COLOR SPEC: SIGNIFICANT CHANGE UP
CORTIS AM PEAK SERPL-MCNC: 16.9 UG/DL — SIGNIFICANT CHANGE UP (ref 6–18.4)
CREAT SERPL-MCNC: 1.89 MG/DL — HIGH (ref 0.5–1.3)
CREAT SERPL-MCNC: 1.93 MG/DL — HIGH (ref 0.5–1.3)
CREAT SERPL-MCNC: 1.93 MG/DL — HIGH (ref 0.5–1.3)
CREAT SERPL-MCNC: 2.09 MG/DL
D DIMER BLD IA.RAPID-MCNC: 419 NG/ML DDU — HIGH
DIFF PNL FLD: NEGATIVE — SIGNIFICANT CHANGE UP
EGFR: 27 ML/MIN/1.73M2
EGFR: 30 ML/MIN/1.73M2 — LOW
EGFR: 30 ML/MIN/1.73M2 — LOW
EGFR: 31 ML/MIN/1.73M2 — LOW
EOSINOPHIL # BLD AUTO: 0.63 K/UL — HIGH (ref 0–0.5)
EOSINOPHIL NFR BLD AUTO: 10.3 % — HIGH (ref 0–6)
EPI CELLS # UR: 1 /HPF — SIGNIFICANT CHANGE UP (ref 0–5)
FIBRINOGEN PPP-MCNC: 376 MG/DL — SIGNIFICANT CHANGE UP (ref 200–465)
FLUAV SUBTYP SPEC NAA+PROBE: SIGNIFICANT CHANGE UP
FLUBV RNA SPEC QL NAA+PROBE: SIGNIFICANT CHANGE UP
GAS PNL BLDV: 130 MMOL/L — LOW (ref 136–145)
GAS PNL BLDV: 131 MMOL/L — LOW (ref 136–145)
GAS PNL BLDV: SIGNIFICANT CHANGE UP
GLUCOSE BLDC GLUCOMTR-MCNC: 151 MG/DL — HIGH (ref 70–99)
GLUCOSE BLDC GLUCOMTR-MCNC: 188 MG/DL — HIGH (ref 70–99)
GLUCOSE BLDC GLUCOMTR-MCNC: 203 MG/DL — HIGH (ref 70–99)
GLUCOSE BLDV-MCNC: 162 MG/DL — HIGH (ref 70–99)
GLUCOSE BLDV-MCNC: 282 MG/DL — HIGH (ref 70–99)
GLUCOSE SERPL-MCNC: 167 MG/DL — HIGH (ref 70–99)
GLUCOSE SERPL-MCNC: 268 MG/DL
GLUCOSE SERPL-MCNC: 270 MG/DL — HIGH (ref 70–99)
GLUCOSE SERPL-MCNC: 277 MG/DL — HIGH (ref 70–99)
GLUCOSE UR QL: ABNORMAL
HADV DNA SPEC QL NAA+PROBE: SIGNIFICANT CHANGE UP
HAPTOGLOB SERPL-MCNC: 78 MG/DL — SIGNIFICANT CHANGE UP (ref 34–200)
HCO3 BLDV-SCNC: 18 MMOL/L — LOW (ref 22–29)
HCO3 BLDV-SCNC: 19 MMOL/L — LOW (ref 22–29)
HCOV 229E RNA SPEC QL NAA+PROBE: SIGNIFICANT CHANGE UP
HCOV HKU1 RNA SPEC QL NAA+PROBE: SIGNIFICANT CHANGE UP
HCOV NL63 RNA SPEC QL NAA+PROBE: SIGNIFICANT CHANGE UP
HCOV OC43 RNA SPEC QL NAA+PROBE: SIGNIFICANT CHANGE UP
HCT VFR BLD CALC: 17.9 % — CRITICAL LOW (ref 34.5–45)
HCT VFR BLD CALC: 20.2 % — CRITICAL LOW (ref 34.5–45)
HCT VFR BLD CALC: 21.6 % — LOW (ref 34.5–45)
HCT VFR BLDA CALC: 19 % — CRITICAL LOW (ref 34.5–46.5)
HCT VFR BLDA CALC: 21 % — CRITICAL LOW (ref 34.5–46.5)
HGB BLD CALC-MCNC: 6.4 G/DL — CRITICAL LOW (ref 11.7–16.1)
HGB BLD CALC-MCNC: 7 G/DL — CRITICAL LOW (ref 11.7–16.1)
HGB BLD-MCNC: 5.7 G/DL — CRITICAL LOW (ref 11.5–15.5)
HGB BLD-MCNC: 6.3 G/DL — CRITICAL LOW (ref 11.5–15.5)
HGB BLD-MCNC: 6.9 G/DL — CRITICAL LOW (ref 11.5–15.5)
HMPV RNA SPEC QL NAA+PROBE: SIGNIFICANT CHANGE UP
HPIV1 RNA SPEC QL NAA+PROBE: SIGNIFICANT CHANGE UP
HPIV2 RNA SPEC QL NAA+PROBE: SIGNIFICANT CHANGE UP
HPIV3 RNA SPEC QL NAA+PROBE: SIGNIFICANT CHANGE UP
HPIV4 RNA SPEC QL NAA+PROBE: SIGNIFICANT CHANGE UP
IANC: 4.33 K/UL — SIGNIFICANT CHANGE UP (ref 1.8–7.4)
IMM GRANULOCYTES NFR BLD AUTO: 0.5 % — SIGNIFICANT CHANGE UP (ref 0–0.9)
INR BLD: 1.15 RATIO — SIGNIFICANT CHANGE UP (ref 0.88–1.16)
KETONES UR-MCNC: NEGATIVE — SIGNIFICANT CHANGE UP
LACTATE BLDV-MCNC: 0.5 MMOL/L — SIGNIFICANT CHANGE UP (ref 0.5–2)
LACTATE BLDV-MCNC: 0.8 MMOL/L — SIGNIFICANT CHANGE UP (ref 0.5–2)
LDH SERPL L TO P-CCNC: 292 U/L — HIGH (ref 135–225)
LEUKOCYTE ESTERASE UR-ACNC: NEGATIVE — SIGNIFICANT CHANGE UP
LYMPHOCYTES # BLD AUTO: 0.67 K/UL — LOW (ref 1–3.3)
LYMPHOCYTES # BLD AUTO: 10.9 % — LOW (ref 13–44)
M PNEUMO DNA SPEC QL NAA+PROBE: SIGNIFICANT CHANGE UP
MAGNESIUM SERPL-MCNC: 1.8 MG/DL — SIGNIFICANT CHANGE UP (ref 1.6–2.6)
MAGNESIUM SERPL-MCNC: 1.8 MG/DL — SIGNIFICANT CHANGE UP (ref 1.6–2.6)
MCHC RBC-ENTMCNC: 25 PG — LOW (ref 27–34)
MCHC RBC-ENTMCNC: 25 PG — LOW (ref 27–34)
MCHC RBC-ENTMCNC: 25.8 PG — LOW (ref 27–34)
MCHC RBC-ENTMCNC: 31.2 GM/DL — LOW (ref 32–36)
MCHC RBC-ENTMCNC: 31.8 GM/DL — LOW (ref 32–36)
MCHC RBC-ENTMCNC: 31.9 GM/DL — LOW (ref 32–36)
MCV RBC AUTO: 78.5 FL — LOW (ref 80–100)
MCV RBC AUTO: 80.2 FL — SIGNIFICANT CHANGE UP (ref 80–100)
MCV RBC AUTO: 80.9 FL — SIGNIFICANT CHANGE UP (ref 80–100)
MONOCYTES # BLD AUTO: 0.45 K/UL — SIGNIFICANT CHANGE UP (ref 0–0.9)
MONOCYTES NFR BLD AUTO: 7.3 % — SIGNIFICANT CHANGE UP (ref 2–14)
NEUTROPHILS # BLD AUTO: 4.33 K/UL — SIGNIFICANT CHANGE UP (ref 1.8–7.4)
NEUTROPHILS NFR BLD AUTO: 70.5 % — SIGNIFICANT CHANGE UP (ref 43–77)
NITRITE UR-MCNC: NEGATIVE — SIGNIFICANT CHANGE UP
NRBC # BLD: 0 /100 WBCS — SIGNIFICANT CHANGE UP (ref 0–0)
NRBC # FLD: 0 K/UL — SIGNIFICANT CHANGE UP (ref 0–0)
NT-PROBNP SERPL-SCNC: 5336 PG/ML — HIGH
PCO2 BLDV: 35 MMHG — LOW (ref 39–52)
PCO2 BLDV: 41 MMHG — SIGNIFICANT CHANGE UP (ref 39–52)
PH BLDV: 7.27 — LOW (ref 7.32–7.43)
PH BLDV: 7.32 — SIGNIFICANT CHANGE UP (ref 7.32–7.43)
PH UR: 6 — SIGNIFICANT CHANGE UP (ref 5–8)
PHOSPHATE SERPL-MCNC: 5 MG/DL — HIGH (ref 2.5–4.5)
PHOSPHATE SERPL-MCNC: 5.1 MG/DL — HIGH (ref 2.5–4.5)
PLATELET # BLD AUTO: 111 K/UL — LOW (ref 150–400)
PLATELET # BLD AUTO: 116 K/UL — LOW (ref 150–400)
PLATELET # BLD AUTO: 126 K/UL — LOW (ref 150–400)
PO2 BLDV: 44 MMHG — SIGNIFICANT CHANGE UP (ref 25–45)
PO2 BLDV: 86 MMHG — HIGH (ref 25–45)
POTASSIUM BLDV-SCNC: 5.6 MMOL/L — HIGH (ref 3.5–5.1)
POTASSIUM BLDV-SCNC: 5.6 MMOL/L — HIGH (ref 3.5–5.1)
POTASSIUM SERPL-MCNC: 5.1 MMOL/L — SIGNIFICANT CHANGE UP (ref 3.5–5.3)
POTASSIUM SERPL-MCNC: 5.5 MMOL/L — HIGH (ref 3.5–5.3)
POTASSIUM SERPL-MCNC: 6 MMOL/L — HIGH (ref 3.5–5.3)
POTASSIUM SERPL-SCNC: 5.1 MMOL/L — SIGNIFICANT CHANGE UP (ref 3.5–5.3)
POTASSIUM SERPL-SCNC: 5.5 MMOL/L — HIGH (ref 3.5–5.3)
POTASSIUM SERPL-SCNC: 6 MMOL/L — HIGH (ref 3.5–5.3)
POTASSIUM SERPL-SCNC: 6.4 MMOL/L
PROCALCITONIN SERPL-MCNC: 0.08 NG/ML — SIGNIFICANT CHANGE UP (ref 0.02–0.1)
PROT SERPL-MCNC: 6.9 G/DL — SIGNIFICANT CHANGE UP (ref 6–8.3)
PROT SERPL-MCNC: 7.5 G/DL
PROT SERPL-MCNC: 7.5 G/DL — SIGNIFICANT CHANGE UP (ref 6–8.3)
PROT UR-MCNC: ABNORMAL
PROTHROM AB SERPL-ACNC: 13.4 SEC — SIGNIFICANT CHANGE UP (ref 10.5–13.4)
RAPID RVP RESULT: SIGNIFICANT CHANGE UP
RBC # BLD: 2.28 M/UL — LOW (ref 3.8–5.2)
RBC # BLD: 2.52 M/UL — LOW (ref 3.8–5.2)
RBC # BLD: 2.67 M/UL — LOW (ref 3.8–5.2)
RBC # FLD: 15.8 % — HIGH (ref 10.3–14.5)
RBC # FLD: 15.9 % — HIGH (ref 10.3–14.5)
RBC # FLD: 15.9 % — HIGH (ref 10.3–14.5)
RBC CASTS # UR COMP ASSIST: 3 /HPF — SIGNIFICANT CHANGE UP (ref 0–4)
RH IG SCN BLD-IMP: POSITIVE — SIGNIFICANT CHANGE UP
RSV RNA SPEC QL NAA+PROBE: SIGNIFICANT CHANGE UP
RV+EV RNA SPEC QL NAA+PROBE: SIGNIFICANT CHANGE UP
SAO2 % BLDV: 67.2 % — SIGNIFICANT CHANGE UP (ref 67–88)
SAO2 % BLDV: 98.2 % — HIGH (ref 67–88)
SARS-COV-2 RNA SPEC QL NAA+PROBE: SIGNIFICANT CHANGE UP
SODIUM SERPL-SCNC: 129 MMOL/L — LOW (ref 135–145)
SODIUM SERPL-SCNC: 130 MMOL/L — LOW (ref 135–145)
SODIUM SERPL-SCNC: 131 MMOL/L
SODIUM SERPL-SCNC: 131 MMOL/L — LOW (ref 135–145)
SP GR SPEC: 1.01 — LOW (ref 1.01–1.05)
T3 SERPL-MCNC: 76 NG/DL — LOW (ref 80–200)
T4 AB SER-ACNC: 6.27 UG/DL — SIGNIFICANT CHANGE UP (ref 5.1–13)
T4 FREE SERPL-MCNC: 1.5 NG/DL — SIGNIFICANT CHANGE UP (ref 0.9–1.8)
TSH SERPL-MCNC: 0.68 UIU/ML — SIGNIFICANT CHANGE UP (ref 0.27–4.2)
TSH SERPL-MCNC: 0.71 UIU/ML — SIGNIFICANT CHANGE UP (ref 0.27–4.2)
UROBILINOGEN FLD QL: SIGNIFICANT CHANGE UP
WBC # BLD: 6.14 K/UL — SIGNIFICANT CHANGE UP (ref 3.8–10.5)
WBC # BLD: 6.77 K/UL — SIGNIFICANT CHANGE UP (ref 3.8–10.5)
WBC # BLD: 6.81 K/UL — SIGNIFICANT CHANGE UP (ref 3.8–10.5)
WBC # FLD AUTO: 6.14 K/UL — SIGNIFICANT CHANGE UP (ref 3.8–10.5)
WBC # FLD AUTO: 6.77 K/UL — SIGNIFICANT CHANGE UP (ref 3.8–10.5)
WBC # FLD AUTO: 6.81 K/UL — SIGNIFICANT CHANGE UP (ref 3.8–10.5)
WBC UR QL: 1 /HPF — SIGNIFICANT CHANGE UP (ref 0–5)

## 2023-06-22 PROCEDURE — 71045 X-RAY EXAM CHEST 1 VIEW: CPT | Mod: 26

## 2023-06-22 PROCEDURE — 99223 1ST HOSP IP/OBS HIGH 75: CPT | Mod: GC

## 2023-06-22 PROCEDURE — 99284 EMERGENCY DEPT VISIT MOD MDM: CPT

## 2023-06-22 RX ORDER — HYDRALAZINE HCL 50 MG
10 TABLET ORAL ONCE
Refills: 0 | Status: COMPLETED | OUTPATIENT
Start: 2023-06-22 | End: 2023-06-22

## 2023-06-22 RX ORDER — DEXTROSE 50 % IN WATER 50 %
25 SYRINGE (ML) INTRAVENOUS ONCE
Refills: 0 | Status: DISCONTINUED | OUTPATIENT
Start: 2023-06-22 | End: 2023-06-23

## 2023-06-22 RX ORDER — PIPERACILLIN AND TAZOBACTAM 4; .5 G/20ML; G/20ML
3.38 INJECTION, POWDER, LYOPHILIZED, FOR SOLUTION INTRAVENOUS EVERY 8 HOURS
Refills: 0 | Status: DISCONTINUED | OUTPATIENT
Start: 2023-06-22 | End: 2023-06-22

## 2023-06-22 RX ORDER — ONDANSETRON 8 MG/1
4 TABLET, FILM COATED ORAL EVERY 8 HOURS
Refills: 0 | Status: DISCONTINUED | OUTPATIENT
Start: 2023-06-22 | End: 2023-06-23

## 2023-06-22 RX ORDER — PIPERACILLIN AND TAZOBACTAM 4; .5 G/20ML; G/20ML
3.38 INJECTION, POWDER, LYOPHILIZED, FOR SOLUTION INTRAVENOUS ONCE
Refills: 0 | Status: COMPLETED | OUTPATIENT
Start: 2023-06-22 | End: 2023-06-22

## 2023-06-22 RX ORDER — HYDRALAZINE HCL 50 MG
50 TABLET ORAL EVERY 8 HOURS
Refills: 0 | Status: DISCONTINUED | OUTPATIENT
Start: 2023-06-22 | End: 2023-06-23

## 2023-06-22 RX ORDER — VANCOMYCIN HCL 1 G
750 VIAL (EA) INTRAVENOUS ONCE
Refills: 0 | Status: COMPLETED | OUTPATIENT
Start: 2023-06-22 | End: 2023-06-22

## 2023-06-22 RX ORDER — SODIUM BICARBONATE 1 MEQ/ML
650 SYRINGE (ML) INTRAVENOUS
Refills: 0 | Status: DISCONTINUED | OUTPATIENT
Start: 2023-06-22 | End: 2023-06-23

## 2023-06-22 RX ORDER — DEXTROSE 50 % IN WATER 50 %
12.5 SYRINGE (ML) INTRAVENOUS ONCE
Refills: 0 | Status: DISCONTINUED | OUTPATIENT
Start: 2023-06-22 | End: 2023-06-23

## 2023-06-22 RX ORDER — SODIUM ZIRCONIUM CYCLOSILICATE 10 G/10G
5 POWDER, FOR SUSPENSION ORAL ONCE
Refills: 0 | Status: COMPLETED | OUTPATIENT
Start: 2023-06-22 | End: 2023-06-22

## 2023-06-22 RX ORDER — LANOLIN ALCOHOL/MO/W.PET/CERES
3 CREAM (GRAM) TOPICAL AT BEDTIME
Refills: 0 | Status: DISCONTINUED | OUTPATIENT
Start: 2023-06-22 | End: 2023-06-23

## 2023-06-22 RX ORDER — LEVOTHYROXINE SODIUM 125 MCG
125 TABLET ORAL DAILY
Refills: 0 | Status: DISCONTINUED | OUTPATIENT
Start: 2023-06-22 | End: 2023-06-23

## 2023-06-22 RX ORDER — PIPERACILLIN AND TAZOBACTAM 4; .5 G/20ML; G/20ML
3.38 INJECTION, POWDER, LYOPHILIZED, FOR SOLUTION INTRAVENOUS EVERY 8 HOURS
Refills: 0 | Status: DISCONTINUED | OUTPATIENT
Start: 2023-06-23 | End: 2023-06-23

## 2023-06-22 RX ORDER — VANCOMYCIN HCL 1 G
VIAL (EA) INTRAVENOUS
Refills: 0 | Status: DISCONTINUED | OUTPATIENT
Start: 2023-06-22 | End: 2023-06-22

## 2023-06-22 RX ORDER — ATORVASTATIN CALCIUM 80 MG/1
40 TABLET, FILM COATED ORAL AT BEDTIME
Refills: 0 | Status: DISCONTINUED | OUTPATIENT
Start: 2023-06-22 | End: 2023-06-23

## 2023-06-22 RX ORDER — ACETAMINOPHEN 500 MG
650 TABLET ORAL EVERY 6 HOURS
Refills: 0 | Status: DISCONTINUED | OUTPATIENT
Start: 2023-06-22 | End: 2023-06-23

## 2023-06-22 RX ORDER — INSULIN LISPRO 100/ML
VIAL (ML) SUBCUTANEOUS AT BEDTIME
Refills: 0 | Status: DISCONTINUED | OUTPATIENT
Start: 2023-06-22 | End: 2023-06-23

## 2023-06-22 RX ORDER — DEXTROSE 50 % IN WATER 50 %
15 SYRINGE (ML) INTRAVENOUS ONCE
Refills: 0 | Status: DISCONTINUED | OUTPATIENT
Start: 2023-06-22 | End: 2023-06-23

## 2023-06-22 RX ORDER — CHLORHEXIDINE GLUCONATE 213 G/1000ML
1 SOLUTION TOPICAL DAILY
Refills: 0 | Status: DISCONTINUED | OUTPATIENT
Start: 2023-06-22 | End: 2023-06-23

## 2023-06-22 RX ORDER — GLUCAGON INJECTION, SOLUTION 0.5 MG/.1ML
1 INJECTION, SOLUTION SUBCUTANEOUS ONCE
Refills: 0 | Status: DISCONTINUED | OUTPATIENT
Start: 2023-06-22 | End: 2023-06-23

## 2023-06-22 RX ORDER — INSULIN LISPRO 100/ML
VIAL (ML) SUBCUTANEOUS
Refills: 0 | Status: DISCONTINUED | OUTPATIENT
Start: 2023-06-22 | End: 2023-06-23

## 2023-06-22 RX ORDER — LEVOTHYROXINE SODIUM 125 MCG
125 TABLET ORAL ONCE
Refills: 0 | Status: COMPLETED | OUTPATIENT
Start: 2023-06-22 | End: 2023-06-22

## 2023-06-22 RX ORDER — HYDRALAZINE HCL 50 MG
5 TABLET ORAL ONCE
Refills: 0 | Status: COMPLETED | OUTPATIENT
Start: 2023-06-22 | End: 2023-06-22

## 2023-06-22 RX ORDER — VANCOMYCIN HCL 1 G
750 VIAL (EA) INTRAVENOUS EVERY 24 HOURS
Refills: 0 | Status: DISCONTINUED | OUTPATIENT
Start: 2023-06-22 | End: 2023-06-22

## 2023-06-22 RX ORDER — BUMETANIDE 0.25 MG/ML
1 INJECTION INTRAMUSCULAR; INTRAVENOUS
Refills: 0 | Status: DISCONTINUED | OUTPATIENT
Start: 2023-06-22 | End: 2023-06-23

## 2023-06-22 RX ADMIN — BUMETANIDE 1 MILLIGRAM(S): 0.25 INJECTION INTRAMUSCULAR; INTRAVENOUS at 16:19

## 2023-06-22 RX ADMIN — SODIUM ZIRCONIUM CYCLOSILICATE 5 GRAM(S): 10 POWDER, FOR SUSPENSION ORAL at 05:38

## 2023-06-22 RX ADMIN — ATORVASTATIN CALCIUM 40 MILLIGRAM(S): 80 TABLET, FILM COATED ORAL at 22:12

## 2023-06-22 RX ADMIN — PIPERACILLIN AND TAZOBACTAM 200 GRAM(S): 4; .5 INJECTION, POWDER, LYOPHILIZED, FOR SOLUTION INTRAVENOUS at 09:18

## 2023-06-22 RX ADMIN — Medication 125 MICROGRAM(S): at 09:20

## 2023-06-22 RX ADMIN — Medication 650 MILLIGRAM(S): at 18:35

## 2023-06-22 RX ADMIN — PIPERACILLIN AND TAZOBACTAM 25 GRAM(S): 4; .5 INJECTION, POWDER, LYOPHILIZED, FOR SOLUTION INTRAVENOUS at 15:58

## 2023-06-22 RX ADMIN — Medication 50 MILLIGRAM(S): at 15:45

## 2023-06-22 RX ADMIN — Medication 750 MILLIGRAM(S): at 12:34

## 2023-06-22 RX ADMIN — Medication 5 MILLIGRAM(S): at 19:37

## 2023-06-22 RX ADMIN — Medication 10 MILLIGRAM(S): at 18:10

## 2023-06-22 RX ADMIN — Medication 50 MILLIGRAM(S): at 22:12

## 2023-06-22 RX ADMIN — Medication 2: at 11:46

## 2023-06-22 NOTE — CONSULT NOTE ADULT - ASSESSMENT
Ms. Hernandez is a 58 year old female with a PMHx of T2DM, hypothyroidism c/b myxedema coma, HLD, CKD, PAH, asthma who presents with hyperkalemia on outpatient labs to 6.4. Endocrinology consulted for management of T2DM and hypothyroidism.    #Hypothyroidism  #Hyperkalemia  #Hyponatremia  - patient presented with K outpatient 6.4, and hyponatremia Na <130, has had multiple am cortisols checked 1/17 21 and 2/25/23 25.8 ruling out adrenal insufficiency Ms. Hernandez is a 58 year old female with a PMHx of T2DM, hypothyroidism c/b myxedema coma, HLD, CKD, PAH, asthma who presents with hyperkalemia on outpatient labs to 6.4. Endocrinology consulted for management of T2DM and hypothyroidism.    #Hypothyroidism  #Hyperkalemia  #Hyponatremia  - patient presented with K outpatient 6.4, and hyponatremia Na <130, has had multiple am cortisols checked 1/17 21 and 2/25/23 25.8 ruling out adrenal insufficiency    T2DM with hyperglycemia  - HbA1c:  - Home Regimen:   - Endocrinologist:  PLAN  - Hold oral DM agents while inpatient  - Start Lantus  units at bedtime. DO NOT HOLD IF NPO.  - Start Admelog  units TID pre-meal. HOLD IF NPO.  - Use moderate/Use low dose Admelog correction scale pre-meal  - Use moderate/Use low dose Admelog correction scale at bedtime  - Fingerstick BG before meals and bedtime  - Goal -180  - Carbohydrate consistent diet  - RD consult  Discharge plan:  - Discharge medications: ************************  - Patient to call doctor with persistent high or low BG at home.   - Ensure patient has glucometer, test strips and lancets on discharge.  - Recommend routine outpatient ophthalmology, podiatry and endocrinology f/u    HTN  - Home regimen:  PLAN  - Can check urine microalbumin outpatient  - Outpatient goal BP <130/80. Management per primary team.    HLD  - Home regimen:  PLAN  - Continue  - Would likely benefit from a statin if no contraindication  - Can check lipid profile if not done recently    Discussed with primary team.    Chavo Crawford MD, Endocrinology Fellow  Pager [phone] from 9am to 5pm. After hours and on weekends, please call [phone].   Ms. Hernandez is a 58 year old female with a PMHx of T2DM, hypothyroidism c/b myxedema coma, HLD, CKD, PAH, asthma who presents with hyperkalemia on outpatient labs to 6.4. Endocrinology consulted for management of T2DM and hypothyroidism.    #Hypothyroidism  #Hyperkalemia  #Hyponatremia  - patient presented with K outpatient 6.4, and hyponatremia Na <130, has had multiple am cortisols checked 1/17 21 and 2/25/23 25.8 ruling out adrenal insufficiency  - patient not bradycardic, is hypertensive but also hypothermic which may be related to severe anemia  - TSH 0.68, FT4 1.5 both within normal limits  - patient endorses compliance with levothyroxine 125 mcg daily (although cannot remember dose)  - low suspicion for AI given prior recent normal cortisols  PLAN  - continue levothyroxine 125 mcg daily (maintain on empty stomach (at least 1 hour before meals), separate by 4 hours from PPI or calcium supplementation which can inhibit its absorption)  - can check 8am SERUM cortisol and ACTH but low suspicion for AI  - can consider nephrology consult for hyperkalemia and hyponatremia and workup for additional etiologies besides AI    T2DM with hyperglycemia  - HbA1c: 6.1 2/23  - Home Regimen: prandin 1mg tid  - Endocrinologist: Dr. Mac  - does endorse hypoglycemia at home and poor po intake  PLAN  - Hold oral DM agents while inpatient  - can check HbA1c  - ensure IV medications in non-dextrose containing formulation as able  - Use low dose Admelog correction scale pre-meal  - Use low dose Admelog correction scale at bedtime  - Fingerstick BG before meals and bedtime  - Goal -180  - Carbohydrate consistent diet  - hypoglycemia protocol prn  - RD consult  Discharge plan:  - Discharge medications: Given hypoglycemia, pending BG control would consider decrease prandin to 0.5mg tid vs stopping prandin and possibly adding a DPP-4 inhibitor  - Patient to call doctor with persistent high or low BG at home.   - Ensure patient has glucometer, test strips and lancets on discharge.  - Recommend routine outpatient ophthalmology, podiatry and endocrinology f/u    HTN  - Home regimen: bumex, hydralazine  PLAN  - Can check urine microalbumin outpatient  - Outpatient goal BP <130/80. Management per primary team.    HLD  - Home regimen: atorvastatin 40mg daily  PLAN  - Continue atorvastatin 40mg daily per primary team  - Can check lipid profile if not done recently    Discussed with primary team.    Chavo Crawford MD, Endocrinology Fellow  Pager [phone] from 9am to 5pm. After hours and on weekends, please call [phone].   Ms. Hernandez is a 58 year old female with a PMHx of T2DM, hypothyroidism c/b myxedema coma, HLD, CKD, PAH, asthma who presents with hyperkalemia on outpatient labs to 6.4. Endocrinology consulted for management of T2DM and hypothyroidism.    #Hypothyroidism  #Hyperkalemia  #Hyponatremia  - patient presented with K outpatient 6.4, and hyponatremia Na <130, has had multiple am cortisols checked 1/17 21 and 2/25/23 25.8 ruling out adrenal insufficiency  - patient not bradycardic, is hypertensive but also hypothermic which may be related to severe anemia  - TSH 0.68, FT4 1.5 both within normal limits  - patient endorses compliance with levothyroxine 125 mcg daily (although cannot remember dose)  - low suspicion for AI given prior recent normal cortisols  PLAN  - continue levothyroxine 125 mcg daily (maintain on empty stomach (at least 1 hour before meals), separate by 4 hours from PPI or calcium supplementation which can inhibit its absorption)  - can check 8am SERUM cortisol and ACTH but low suspicion for AI  - can consider nephrology consult for hyperkalemia and hyponatremia and workup for additional etiologies besides AI    T2DM with hyperglycemia  - HbA1c: 6.1 2/23  - Home Regimen: prandin 1mg tid  - Endocrinologist: Dr. Mac  - does endorse hypoglycemia at home and poor po intake  PLAN  - Hold oral DM agents while inpatient  - can check HbA1c  - ensure IV medications in non-dextrose containing formulation as able  - Use low dose Admelog correction scale pre-meal  - Use low dose Admelog correction scale at bedtime  - Fingerstick BG before meals and bedtime  - Goal -180  - Carbohydrate consistent diet  - hypoglycemia protocol prn  - RD consult  Discharge plan:  - Discharge medications: Given hypoglycemia, pending BG control would consider decrease prandin to 0.5mg tid vs stopping prandin and possibly adding a DPP-4 inhibitor  - Patient to call doctor with persistent high or low BG at home.   - Ensure patient has glucometer, test strips and lancets on discharge.  - Recommend routine outpatient ophthalmology, podiatry and endocrinology f/u    HTN  - Home regimen: bumex, hydralazine  PLAN  - Can check urine microalbumin outpatient  - Outpatient goal BP <130/80. Management per primary team.    HLD  - Home regimen: atorvastatin 40mg daily  PLAN  - Continue atorvastatin 40mg daily per primary team  - Can check lipid profile if not done recently    Patient has follow up with SOULEYMANE Recinos on 9/26 at 2pm for follow up.  [address] [place] [address]  Phone Number: [phone]    Discussed with primary team.    Chavo Crawford MD, Endocrinology Fellow  Pager [phone] from 9am to 5pm. After hours and on weekends, please call [phone].

## 2023-06-22 NOTE — CONSULT NOTE ADULT - SUBJECTIVE AND OBJECTIVE BOX
ENDOCRINE INITIAL CONSULT - hypothyroidism    HPI:  Ms. Hernandez is a 58 year old female with a PMHx of T2DM, hypothyroidism c/b myxedema coma, HLD, CKD, PAH, asthma who presents with hyperkalemia on outpatient labs to 6.4.    ENDOCRINE HISTORY   The patient has a significant history of hypothyroidism for many years complicated by myxedema coma. Several times the patient has had hyponatremia to 120s with multiple cortisols checked ruling out adrenal insufficiency (1/17/23 am cortisol 21.2, 2/25/23 am cortisol 25.8). On     Endocrinologist:  Social History:  Family History:  Surgical History:  Insurance:  Resides In:    PAST MEDICAL & SURGICAL HISTORY:  HTN (hypertension)      HLD (hyperlipidemia)      DM (diabetes mellitus)      Hypothyroid      H/O pulmonary hypertension      CKD (chronic kidney disease)      S/P cataract surgery          FAMILY HISTORY:  FH: stroke (Father)        Social History:      Home Medications:  omeprazole 20 mg oral delayed release tablet: 1 tab(s) orally once a day (22 Jun 2023 08:52)  Prandin 1 mg oral tablet: 0.5 tab(s) orally 3 times a day (before meals) (Pharmacy filled for 1 tablet PO TID). (22 Jun 2023 08:52)      MEDICATIONS  (STANDING):  atorvastatin 40 milliGRAM(s) Oral at bedtime  buMETAnide 1 milliGRAM(s) Oral two times a day  dextrose 50% Injectable 25 Gram(s) IV Push once  dextrose 50% Injectable 12.5 Gram(s) IV Push once  dextrose 50% Injectable 25 Gram(s) IV Push once  dextrose Oral Gel 15 Gram(s) Oral once  glucagon  Injectable 1 milliGRAM(s) IntraMuscular once  hydrALAZINE 50 milliGRAM(s) Oral every 8 hours  insulin lispro (ADMELOG) corrective regimen sliding scale   SubCutaneous three times a day before meals  insulin lispro (ADMELOG) corrective regimen sliding scale   SubCutaneous at bedtime  levothyroxine 125 MICROGram(s) Oral daily  piperacillin/tazobactam IVPB.- 3.375 Gram(s) IV Intermittent once  piperacillin/tazobactam IVPB.. 3.375 Gram(s) IV Intermittent every 8 hours  sodium bicarbonate 650 milliGRAM(s) Oral two times a day  vancomycin  IVPB        MEDICATIONS  (PRN):  acetaminophen     Tablet .. 650 milliGRAM(s) Oral every 6 hours PRN Temp greater or equal to 38C (100.4F), Mild Pain (1 - 3)  aluminum hydroxide/magnesium hydroxide/simethicone Suspension 30 milliLiter(s) Oral every 4 hours PRN Dyspepsia  melatonin 3 milliGRAM(s) Oral at bedtime PRN Insomnia  ondansetron Injectable 4 milliGRAM(s) IV Push every 8 hours PRN Nausea and/or Vomiting      Allergies    No Known Allergies    Intolerances        REVIEW OF SYSTEMS  Constitutional: No fever  Eyes: No blurry vision  Neuro: No tremors  HEENT: No pain  Cardiovascular: No chest pain, palpitations  Respiratory: No SOB, no cough  GI: No nausea, vomiting, abdominal pain  : No dysuria  Skin: no rash  Psych: no depression  Endocrine: no polyuria, polydipsia  Hem/lymph: no swelling  Osteoporosis: no fractures  ALL OTHER SYSTEMS REVIEWED AND NEGATIVE     UNABLE TO OBTAIN     PHYSICAL EXAM   Vital Signs Last 24 Hrs  T(C): 36.7 (22 Jun 2023 08:35), Max: 36.7 (22 Jun 2023 08:35)  T(F): 98 (22 Jun 2023 08:35), Max: 98 (22 Jun 2023 08:35)  HR: 64 (22 Jun 2023 09:15) (58 - 64)  BP: 146/60 (22 Jun 2023 09:15) (146/60 - 181/68)  BP(mean): --  RR: 16 (22 Jun 2023 09:15) (16 - 16)  SpO2: 99% (22 Jun 2023 09:15) (99% - 100%)    Parameters below as of 22 Jun 2023 06:53  Patient On (Oxygen Delivery Method): room air      GENERAL: NAD, well-groomed, well-developed  EYES: No proptosis, no lid lag, anicteric  HEENT:  Atraumatic, Normocephalic, moist mucous membranes  THYROID: Normal size, no palpable nodules  RESPIRATORY: Clear to auscultation bilaterally; No rales, rhonchi, wheezing  CARDIOVASCULAR: Regular rate and rhythm; No murmurs; no peripheral edema  GI: Soft, nontender, non distended, normal bowel sounds  SKIN: Dry, intact, No rashes or lesions  MUSCULOSKELETAL: Full range of motion, normal strength  NEURO: sensation intact, extraocular movements intact, no tremor  PSYCH: Alert and oriented x 3, normal affect, normal mood  CUSHING'S SIGNS: no striae    CAPILLARY BLOOD GLUCOSE      POCT Blood Glucose.: 203 mg/dL (22 Jun 2023 11:37)  POCT Blood Glucose.: 291 mg/dL (22 Jun 2023 00:50)      A1C with Estimated Average Glucose Result: 6.1 % (02-26-23 @ 05:32)  A1C with Estimated Average Glucose Result: 6.9 % (01-17-23 @ 07:10)                            6.9    6.14  )-----------( 116      ( 22 Jun 2023 06:50 )             21.6       06-22    131<L>  |  100  |  71<H>  ----------------------------<  167<H>  5.1   |  16<L>  |  1.89<H>    Ca    8.9      22 Jun 2023 06:50  Phos  5.0     06-22  Mg     1.80     06-22    TPro  6.9  /  Alb  3.6  /  TBili  0.8  /  DBili  x   /  AST  30  /  ALT  50<H>  /  AlkPhos  341<H>  06-22      Thyroid Stimulating Hormone, Serum: 0.68 uIU/mL (06-22-23 @ 01:17)  Thyroid Stimulating Hormone, Serum: 0.71 uIU/mL (06-22-23 @ 00:50)      LIPIDS    RADIOLOGY ENDOCRINE INITIAL CONSULT - hypothyroidism    HPI:  Ms. Hernandez is a 58 year old female with a PMHx of T2DM, hypothyroidism c/b myxedema coma, HLD, CKD, PAH, asthma who presents with hyperkalemia on outpatient labs to 6.4.    ENDOCRINE HISTORY   The patient has a significant history of hypothyroidism diagnosed when had myxedma coma 3/22, + TPO 12/22. Several times the patient has had hyponatremia to 120s with multiple cortisols checked ruling out adrenal insufficiency (1/17/23 am cortisol 21.2, 2/25/23 am cortisol 25.8). The patient takes levothyroxine 125 mcg daily. The patient also has T2DM for 24 years, initially diagnosed with GDM in 1999. Per chart review has no macrovascular complications of diabetes, no neuropathy, does have nephropathy and possible retinopathy, had retinal hemorrhage on recent exam. The patient was previously on januvia 25mg daily, low dose tresiba which were both stopped outpatient given HbA1c 6.1.    Endocrinologist: Dr. Mac  Social History:  Family History:  Surgical History:  Insurance:  Resides In:    PAST MEDICAL & SURGICAL HISTORY:  HTN (hypertension)      HLD (hyperlipidemia)      DM (diabetes mellitus)      Hypothyroid      H/O pulmonary hypertension      CKD (chronic kidney disease)      S/P cataract surgery          FAMILY HISTORY:  FH: stroke (Father)        Social History:      Home Medications:  omeprazole 20 mg oral delayed release tablet: 1 tab(s) orally once a day (22 Jun 2023 08:52)  Prandin 1 mg oral tablet: 0.5 tab(s) orally 3 times a day (before meals) (Pharmacy filled for 1 tablet PO TID). (22 Jun 2023 08:52)      MEDICATIONS  (STANDING):  atorvastatin 40 milliGRAM(s) Oral at bedtime  buMETAnide 1 milliGRAM(s) Oral two times a day  dextrose 50% Injectable 25 Gram(s) IV Push once  dextrose 50% Injectable 12.5 Gram(s) IV Push once  dextrose 50% Injectable 25 Gram(s) IV Push once  dextrose Oral Gel 15 Gram(s) Oral once  glucagon  Injectable 1 milliGRAM(s) IntraMuscular once  hydrALAZINE 50 milliGRAM(s) Oral every 8 hours  insulin lispro (ADMELOG) corrective regimen sliding scale   SubCutaneous three times a day before meals  insulin lispro (ADMELOG) corrective regimen sliding scale   SubCutaneous at bedtime  levothyroxine 125 MICROGram(s) Oral daily  piperacillin/tazobactam IVPB.- 3.375 Gram(s) IV Intermittent once  piperacillin/tazobactam IVPB.. 3.375 Gram(s) IV Intermittent every 8 hours  sodium bicarbonate 650 milliGRAM(s) Oral two times a day  vancomycin  IVPB        MEDICATIONS  (PRN):  acetaminophen     Tablet .. 650 milliGRAM(s) Oral every 6 hours PRN Temp greater or equal to 38C (100.4F), Mild Pain (1 - 3)  aluminum hydroxide/magnesium hydroxide/simethicone Suspension 30 milliLiter(s) Oral every 4 hours PRN Dyspepsia  melatonin 3 milliGRAM(s) Oral at bedtime PRN Insomnia  ondansetron Injectable 4 milliGRAM(s) IV Push every 8 hours PRN Nausea and/or Vomiting      Allergies    No Known Allergies    Intolerances        REVIEW OF SYSTEMS  Constitutional: No fever  Eyes: No blurry vision  Neuro: No tremors  HEENT: No pain  Cardiovascular: No chest pain, palpitations  Respiratory: No SOB, no cough  GI: No nausea, vomiting, abdominal pain  : No dysuria  Skin: no rash  Psych: no depression  Endocrine: no polyuria, polydipsia  Hem/lymph: no swelling  Osteoporosis: no fractures  ALL OTHER SYSTEMS REVIEWED AND NEGATIVE     UNABLE TO OBTAIN     PHYSICAL EXAM   Vital Signs Last 24 Hrs  T(C): 36.7 (22 Jun 2023 08:35), Max: 36.7 (22 Jun 2023 08:35)  T(F): 98 (22 Jun 2023 08:35), Max: 98 (22 Jun 2023 08:35)  HR: 64 (22 Jun 2023 09:15) (58 - 64)  BP: 146/60 (22 Jun 2023 09:15) (146/60 - 181/68)  BP(mean): --  RR: 16 (22 Jun 2023 09:15) (16 - 16)  SpO2: 99% (22 Jun 2023 09:15) (99% - 100%)    Parameters below as of 22 Jun 2023 06:53  Patient On (Oxygen Delivery Method): room air      GENERAL: NAD, well-groomed, well-developed  EYES: No proptosis, no lid lag, anicteric  HEENT:  Atraumatic, Normocephalic, moist mucous membranes  THYROID: Normal size, no palpable nodules  RESPIRATORY: Clear to auscultation bilaterally; No rales, rhonchi, wheezing  CARDIOVASCULAR: Regular rate and rhythm; No murmurs; no peripheral edema  GI: Soft, nontender, non distended, normal bowel sounds  SKIN: Dry, intact, No rashes or lesions  MUSCULOSKELETAL: Full range of motion, normal strength  NEURO: sensation intact, extraocular movements intact, no tremor  PSYCH: Alert and oriented x 3, normal affect, normal mood  CUSHING'S SIGNS: no striae    CAPILLARY BLOOD GLUCOSE      POCT Blood Glucose.: 203 mg/dL (22 Jun 2023 11:37)  POCT Blood Glucose.: 291 mg/dL (22 Jun 2023 00:50)      A1C with Estimated Average Glucose Result: 6.1 % (02-26-23 @ 05:32)  A1C with Estimated Average Glucose Result: 6.9 % (01-17-23 @ 07:10)                            6.9    6.14  )-----------( 116      ( 22 Jun 2023 06:50 )             21.6       06-22    131<L>  |  100  |  71<H>  ----------------------------<  167<H>  5.1   |  16<L>  |  1.89<H>    Ca    8.9      22 Jun 2023 06:50  Phos  5.0     06-22  Mg     1.80     06-22    TPro  6.9  /  Alb  3.6  /  TBili  0.8  /  DBili  x   /  AST  30  /  ALT  50<H>  /  AlkPhos  341<H>  06-22      Thyroid Stimulating Hormone, Serum: 0.68 uIU/mL (06-22-23 @ 01:17)  Thyroid Stimulating Hormone, Serum: 0.71 uIU/mL (06-22-23 @ 00:50)      LIPIDS    RADIOLOGY ENDOCRINE INITIAL CONSULT - hypothyroidism    HPI:  Ms. Hernandez is a 58 year old female with a PMHx of T2DM, hypothyroidism c/b myxedema coma, HLD, CKD, PAH, asthma who presents with hyperkalemia on outpatient labs to 6.4.    ENDOCRINE HISTORY   The patient has a significant history of hypothyroidism diagnosed when had myxedma coma 3/22, + TPO 12/22. Several times the patient has had hyponatremia to 120s with multiple cortisols checked ruling out adrenal insufficiency (1/17/23 am cortisol 21.2, 2/25/23 am cortisol 25.8). The patient takes levothyroxine 125 mcg daily. The patient also has T2DM for 24 years, initially diagnosed with GDM in 1999. Per chart review has no macrovascular complications of diabetes, no neuropathy, does have nephropathy and possible retinopathy, had retinal hemorrhage on recent exam. The patient was previously on januvia 25mg daily, low dose tresiba which were both stopped outpatient given HbA1c 6.1.    She reports she has been taking levothyroxine (does not know the dose) every morning around 6:30am and eats around 7-8am, repaglinide 1mg tid with meals    Endocrinologist: Dr. Mac  Social History:  Family History:  Surgical History:  Insurance:  Resides In:    PAST MEDICAL & SURGICAL HISTORY:  HTN (hypertension)      HLD (hyperlipidemia)      DM (diabetes mellitus)      Hypothyroid      H/O pulmonary hypertension      CKD (chronic kidney disease)      S/P cataract surgery          FAMILY HISTORY:  FH: stroke (Father)        Social History:      Home Medications:  omeprazole 20 mg oral delayed release tablet: 1 tab(s) orally once a day (22 Jun 2023 08:52)  Prandin 1 mg oral tablet: 0.5 tab(s) orally 3 times a day (before meals) (Pharmacy filled for 1 tablet PO TID). (22 Jun 2023 08:52)      MEDICATIONS  (STANDING):  atorvastatin 40 milliGRAM(s) Oral at bedtime  buMETAnide 1 milliGRAM(s) Oral two times a day  dextrose 50% Injectable 25 Gram(s) IV Push once  dextrose 50% Injectable 12.5 Gram(s) IV Push once  dextrose 50% Injectable 25 Gram(s) IV Push once  dextrose Oral Gel 15 Gram(s) Oral once  glucagon  Injectable 1 milliGRAM(s) IntraMuscular once  hydrALAZINE 50 milliGRAM(s) Oral every 8 hours  insulin lispro (ADMELOG) corrective regimen sliding scale   SubCutaneous three times a day before meals  insulin lispro (ADMELOG) corrective regimen sliding scale   SubCutaneous at bedtime  levothyroxine 125 MICROGram(s) Oral daily  piperacillin/tazobactam IVPB.- 3.375 Gram(s) IV Intermittent once  piperacillin/tazobactam IVPB.. 3.375 Gram(s) IV Intermittent every 8 hours  sodium bicarbonate 650 milliGRAM(s) Oral two times a day  vancomycin  IVPB        MEDICATIONS  (PRN):  acetaminophen     Tablet .. 650 milliGRAM(s) Oral every 6 hours PRN Temp greater or equal to 38C (100.4F), Mild Pain (1 - 3)  aluminum hydroxide/magnesium hydroxide/simethicone Suspension 30 milliLiter(s) Oral every 4 hours PRN Dyspepsia  melatonin 3 milliGRAM(s) Oral at bedtime PRN Insomnia  ondansetron Injectable 4 milliGRAM(s) IV Push every 8 hours PRN Nausea and/or Vomiting      Allergies    No Known Allergies    Intolerances        REVIEW OF SYSTEMS  Constitutional: No fever  Eyes: No blurry vision  Neuro: No tremors  HEENT: No pain  Cardiovascular: No chest pain, palpitations  Respiratory: No SOB, no cough  GI: No nausea, vomiting, abdominal pain  : No dysuria  Skin: no rash  Psych: no depression  Endocrine: no polyuria, polydipsia  Hem/lymph: no swelling  Osteoporosis: no fractures  ALL OTHER SYSTEMS REVIEWED AND NEGATIVE     UNABLE TO OBTAIN     PHYSICAL EXAM   Vital Signs Last 24 Hrs  T(C): 36.7 (22 Jun 2023 08:35), Max: 36.7 (22 Jun 2023 08:35)  T(F): 98 (22 Jun 2023 08:35), Max: 98 (22 Jun 2023 08:35)  HR: 64 (22 Jun 2023 09:15) (58 - 64)  BP: 146/60 (22 Jun 2023 09:15) (146/60 - 181/68)  BP(mean): --  RR: 16 (22 Jun 2023 09:15) (16 - 16)  SpO2: 99% (22 Jun 2023 09:15) (99% - 100%)    Parameters below as of 22 Jun 2023 06:53  Patient On (Oxygen Delivery Method): room air      GENERAL: NAD, well-groomed, well-developed  EYES: No proptosis, no lid lag, anicteric  HEENT:  Atraumatic, Normocephalic, moist mucous membranes  THYROID: Normal size, no palpable nodules  RESPIRATORY: Clear to auscultation bilaterally; No rales, rhonchi, wheezing  CARDIOVASCULAR: Regular rate and rhythm; No murmurs; no peripheral edema  GI: Soft, nontender, non distended, normal bowel sounds  SKIN: Dry, intact, No rashes or lesions  MUSCULOSKELETAL: Full range of motion, normal strength  NEURO: sensation intact, extraocular movements intact, no tremor  PSYCH: Alert and oriented x 3, normal affect, normal mood  CUSHING'S SIGNS: no striae    CAPILLARY BLOOD GLUCOSE      POCT Blood Glucose.: 203 mg/dL (22 Jun 2023 11:37)  POCT Blood Glucose.: 291 mg/dL (22 Jun 2023 00:50)      A1C with Estimated Average Glucose Result: 6.1 % (02-26-23 @ 05:32)  A1C with Estimated Average Glucose Result: 6.9 % (01-17-23 @ 07:10)                            6.9    6.14  )-----------( 116      ( 22 Jun 2023 06:50 )             21.6       06-22    131<L>  |  100  |  71<H>  ----------------------------<  167<H>  5.1   |  16<L>  |  1.89<H>    Ca    8.9      22 Jun 2023 06:50  Phos  5.0     06-22  Mg     1.80     06-22    TPro  6.9  /  Alb  3.6  /  TBili  0.8  /  DBili  x   /  AST  30  /  ALT  50<H>  /  AlkPhos  341<H>  06-22      Thyroid Stimulating Hormone, Serum: 0.68 uIU/mL (06-22-23 @ 01:17)  Thyroid Stimulating Hormone, Serum: 0.71 uIU/mL (06-22-23 @ 00:50)      LIPIDS    RADIOLOGY ENDOCRINE INITIAL CONSULT - hypothyroidism    HPI:  Ms. Hernandez is a 58 year old female with a PMHx of T2DM, hypothyroidism c/b myxedema coma, HLD, CKD, PAH, asthma who presents with hyperkalemia on outpatient labs to 6.4.    ENDOCRINE HISTORY   The patient has a significant history of hypothyroidism diagnosed when had myxedma coma 3/22, + TPO 12/22. Several times the patient has had hyponatremia to 120s with multiple cortisols checked ruling out adrenal insufficiency (1/17/23 am cortisol 21.2, 2/25/23 am cortisol 25.8). The patient takes levothyroxine 125 mcg daily. The patient also has T2DM for 24 years, initially diagnosed with GDM in 1999. Per chart review has no macrovascular complications of diabetes, no neuropathy, does have nephropathy and possible retinopathy, had retinal hemorrhage on recent exam. The patient was previously on januvia 25mg daily, low dose tresiba which were both stopped outpatient given HbA1c 6.1.    She reports she has been taking levothyroxine (does not know the dose) every morning around 6:30am and eats around 7-8am, repaglinide 1mg tid with meals. She denies missing doses of her medication. She reports her appetite hasn't been good and has only been eating lighter foods since last being in the hospital. She reports checking her sugars at home in the morning , no symptoms when BG <70, reports does not happen often (cannot tell me how often), before lunch: 100s, before dinner same as lunch, bedtime: can be 200. She reports she is not on insulin anymore because she has had nocturnal hypoglycemia. She denies palpitations, tremors, diarrhea, constipation, nausea, vomiting, chest pain, sob, neck pain, dysphonia, she endorses skin changes from the hospital and hair loss and thinks she is allergic to something as her skin has stuff on it, reports may have lost some weight as not eating much, endorses dysphagia blurry vision.    Endocrinologist: Dr. Mac  Social History: denies tobacco use, alcohol use, recreational drug use  Family History: endorses father with T2DM, denies family history of thyroid disease  Surgical History: as above    PAST MEDICAL & SURGICAL HISTORY:  HTN (hypertension)      HLD (hyperlipidemia)      DM (diabetes mellitus)      Hypothyroid      H/O pulmonary hypertension      CKD (chronic kidney disease)      S/P cataract surgery          FAMILY HISTORY:  FH: stroke (Father)          Home Medications:  omeprazole 20 mg oral delayed release tablet: 1 tab(s) orally once a day (22 Jun 2023 08:52)  Prandin 1 mg oral tablet: 0.5 tab(s) orally 3 times a day (before meals) (Pharmacy filled for 1 tablet PO TID). (22 Jun 2023 08:52)      MEDICATIONS  (STANDING):  atorvastatin 40 milliGRAM(s) Oral at bedtime  buMETAnide 1 milliGRAM(s) Oral two times a day  dextrose 50% Injectable 25 Gram(s) IV Push once  dextrose 50% Injectable 12.5 Gram(s) IV Push once  dextrose 50% Injectable 25 Gram(s) IV Push once  dextrose Oral Gel 15 Gram(s) Oral once  glucagon  Injectable 1 milliGRAM(s) IntraMuscular once  hydrALAZINE 50 milliGRAM(s) Oral every 8 hours  insulin lispro (ADMELOG) corrective regimen sliding scale   SubCutaneous three times a day before meals  insulin lispro (ADMELOG) corrective regimen sliding scale   SubCutaneous at bedtime  levothyroxine 125 MICROGram(s) Oral daily  piperacillin/tazobactam IVPB.- 3.375 Gram(s) IV Intermittent once  piperacillin/tazobactam IVPB.. 3.375 Gram(s) IV Intermittent every 8 hours  sodium bicarbonate 650 milliGRAM(s) Oral two times a day  vancomycin  IVPB        MEDICATIONS  (PRN):  acetaminophen     Tablet .. 650 milliGRAM(s) Oral every 6 hours PRN Temp greater or equal to 38C (100.4F), Mild Pain (1 - 3)  aluminum hydroxide/magnesium hydroxide/simethicone Suspension 30 milliLiter(s) Oral every 4 hours PRN Dyspepsia  melatonin 3 milliGRAM(s) Oral at bedtime PRN Insomnia  ondansetron Injectable 4 milliGRAM(s) IV Push every 8 hours PRN Nausea and/or Vomiting      Allergies    No Known Allergies    Intolerances        REVIEW OF SYSTEMS  Constitutional: No fever  Eyes: No blurry vision  Neuro: No tremors  HEENT: No pain  Cardiovascular: No chest pain, palpitations  Respiratory: No SOB, no cough  GI: No nausea, vomiting, abdominal pain, diarrhea, constipation  : No dysuria  Skin: endorses rash  Psych: no depression  Endocrine: no polyuria, polydipsia  Hem/lymph: no swelling  Osteoporosis: no fractures  ALL OTHER SYSTEMS REVIEWED AND NEGATIVE     UNABLE TO OBTAIN     PHYSICAL EXAM   Vital Signs Last 24 Hrs  T(C): 36.7 (22 Jun 2023 08:35), Max: 36.7 (22 Jun 2023 08:35)  T(F): 98 (22 Jun 2023 08:35), Max: 98 (22 Jun 2023 08:35)  HR: 64 (22 Jun 2023 09:15) (58 - 64)  BP: 146/60 (22 Jun 2023 09:15) (146/60 - 181/68)  BP(mean): --  RR: 16 (22 Jun 2023 09:15) (16 - 16)  SpO2: 99% (22 Jun 2023 09:15) (99% - 100%)    Parameters below as of 22 Jun 2023 06:53  Patient On (Oxygen Delivery Method): room air      GENERAL: NAD, well-groomed, well-developed  EYES: No proptosis, no lid lag, anicteric  HEENT:  Atraumatic, Normocephalic, moist mucous membranes  THYROID: Normal size, no palpable nodules  RESPIRATORY: Clear to auscultation bilaterally; No rales, rhonchi, wheezing  CARDIOVASCULAR: Regular rate and rhythm; No murmurs; no peripheral edema  GI: Soft, nontender, non distended, normal bowel sounds  SKIN: Dry, intact, No rashes or lesions  MUSCULOSKELETAL: Full range of motion, normal strength  NEURO: sensation intact, extraocular movements intact, no tremor  PSYCH: Alert and oriented x 3, normal affect, normal mood  CUSHING'S SIGNS: no striae    CAPILLARY BLOOD GLUCOSE      POCT Blood Glucose.: 203 mg/dL (22 Jun 2023 11:37)  POCT Blood Glucose.: 291 mg/dL (22 Jun 2023 00:50)      A1C with Estimated Average Glucose Result: 6.1 % (02-26-23 @ 05:32)  A1C with Estimated Average Glucose Result: 6.9 % (01-17-23 @ 07:10)                            6.9    6.14  )-----------( 116      ( 22 Jun 2023 06:50 )             21.6       06-22    131<L>  |  100  |  71<H>  ----------------------------<  167<H>  5.1   |  16<L>  |  1.89<H>    Ca    8.9      22 Jun 2023 06:50  Phos  5.0     06-22  Mg     1.80     06-22    TPro  6.9  /  Alb  3.6  /  TBili  0.8  /  DBili  x   /  AST  30  /  ALT  50<H>  /  AlkPhos  341<H>  06-22      Thyroid Stimulating Hormone, Serum: 0.68 uIU/mL (06-22-23 @ 01:17)  Thyroid Stimulating Hormone, Serum: 0.71 uIU/mL (06-22-23 @ 00:50)      LIPIDS    RADIOLOGY ENDOCRINE INITIAL CONSULT - hypothyroidism    HPI:  Ms. Hernandez is a 58 year old female with a PMHx of T2DM, hypothyroidism c/b myxedema coma, HLD, CKD, PAH, asthma who presents with hyperkalemia on outpatient labs to 6.4.    ENDOCRINE HISTORY   The patient has a significant history of hypothyroidism diagnosed when had myxedma coma 3/22, + TPO 12/22. Several times the patient has had hyponatremia to 120s with multiple cortisols checked ruling out adrenal insufficiency (1/17/23 am cortisol 21.2, 2/25/23 am cortisol 25.8). The patient takes levothyroxine 125 mcg daily. The patient also has T2DM for 24 years, initially diagnosed with GDM in 1999. Per chart review has no macrovascular complications of diabetes, no neuropathy, does have nephropathy and possible retinopathy, had retinal hemorrhage on recent exam. The patient was previously on januvia 25mg daily, low dose tresiba which were both stopped outpatient given HbA1c 6.1.    She reports she has been taking levothyroxine (does not know the dose) every morning around 6:30am and eats around 7-8am, repaglinide 1mg tid with meals. She denies missing doses of her medication. She reports her appetite hasn't been good and has only been eating lighter foods since last being in the hospital. She reports checking her sugars at home in the morning , no symptoms when BG <70, reports does not happen often (cannot tell me how often), before lunch: 100s, before dinner same as lunch, bedtime: can be 200. She reports she is not on insulin anymore because she has had nocturnal hypoglycemia. She denies palpitations, tremors, diarrhea, constipation, nausea, vomiting, chest pain, sob, neck pain, dysphonia, she endorses fatigue, skin changes from the hospital and hair loss and thinks she is allergic to something as her skin has stuff on it, reports may have lost some weight as not eating much, endorses dysphagia blurry vision. She endorses neuropathy.    Endocrinologist: Dr. Mac  Social History: denies tobacco use, alcohol use, recreational drug use  Family History: endorses father with T2DM, denies family history of thyroid disease  Surgical History: as above    PAST MEDICAL & SURGICAL HISTORY:  HTN (hypertension)      HLD (hyperlipidemia)      DM (diabetes mellitus)      Hypothyroid      H/O pulmonary hypertension      CKD (chronic kidney disease)      S/P cataract surgery          FAMILY HISTORY:  FH: stroke (Father)          Home Medications:  omeprazole 20 mg oral delayed release tablet: 1 tab(s) orally once a day (22 Jun 2023 08:52)  Prandin 1 mg oral tablet: 0.5 tab(s) orally 3 times a day (before meals) (Pharmacy filled for 1 tablet PO TID). (22 Jun 2023 08:52)      MEDICATIONS  (STANDING):  atorvastatin 40 milliGRAM(s) Oral at bedtime  buMETAnide 1 milliGRAM(s) Oral two times a day  dextrose 50% Injectable 25 Gram(s) IV Push once  dextrose 50% Injectable 12.5 Gram(s) IV Push once  dextrose 50% Injectable 25 Gram(s) IV Push once  dextrose Oral Gel 15 Gram(s) Oral once  glucagon  Injectable 1 milliGRAM(s) IntraMuscular once  hydrALAZINE 50 milliGRAM(s) Oral every 8 hours  insulin lispro (ADMELOG) corrective regimen sliding scale   SubCutaneous three times a day before meals  insulin lispro (ADMELOG) corrective regimen sliding scale   SubCutaneous at bedtime  levothyroxine 125 MICROGram(s) Oral daily  piperacillin/tazobactam IVPB.- 3.375 Gram(s) IV Intermittent once  piperacillin/tazobactam IVPB.. 3.375 Gram(s) IV Intermittent every 8 hours  sodium bicarbonate 650 milliGRAM(s) Oral two times a day  vancomycin  IVPB        MEDICATIONS  (PRN):  acetaminophen     Tablet .. 650 milliGRAM(s) Oral every 6 hours PRN Temp greater or equal to 38C (100.4F), Mild Pain (1 - 3)  aluminum hydroxide/magnesium hydroxide/simethicone Suspension 30 milliLiter(s) Oral every 4 hours PRN Dyspepsia  melatonin 3 milliGRAM(s) Oral at bedtime PRN Insomnia  ondansetron Injectable 4 milliGRAM(s) IV Push every 8 hours PRN Nausea and/or Vomiting      Allergies    No Known Allergies    Intolerances        REVIEW OF SYSTEMS  Constitutional: No fever  Eyes: No blurry vision  Neuro: No tremors  HEENT: No pain  Cardiovascular: No chest pain, palpitations  Respiratory: No SOB, no cough  GI: No nausea, vomiting, abdominal pain, diarrhea, constipation  : No dysuria  Skin: endorses rash  Psych: no depression  Endocrine: no polyuria, polydipsia  Hem/lymph: no swelling  Osteoporosis: no fractures  ALL OTHER SYSTEMS REVIEWED AND NEGATIVE     PHYSICAL EXAM   Vital Signs Last 24 Hrs  T(C): 36.7 (22 Jun 2023 08:35), Max: 36.7 (22 Jun 2023 08:35)  T(F): 98 (22 Jun 2023 08:35), Max: 98 (22 Jun 2023 08:35)  HR: 64 (22 Jun 2023 09:15) (58 - 64)  BP: 146/60 (22 Jun 2023 09:15) (146/60 - 181/68)  BP(mean): --  RR: 16 (22 Jun 2023 09:15) (16 - 16)  SpO2: 99% (22 Jun 2023 09:15) (99% - 100%)    Parameters below as of 22 Jun 2023 06:53  Patient On (Oxygen Delivery Method): room air      GENERAL: tired appearing, lying in bed  EYES: No proptosis, no lid lag, anicteric  HEENT:  Atraumatic, Normocephalic, dry mucous membranes  THYROID: Normal size, no palpable nodules  RESPIRATORY: Clear to auscultation bilaterally; No rales, rhonchi, wheezing  CARDIOVASCULAR: Regular rate and rhythm; No murmurs  GI: Soft, nontender, non distended, normal bowel sounds  SKIN: Dry, intact  MUSCULOSKELETAL: moving extremities spontaneously, no peripheral edema  NEURO: sensation intact on plantar surface of feet to gross palpation, extraocular movements intact, no tremor  PSYCH: Answering questions appropriately, normal affect, normal mood  CUSHING'S SIGNS: no striae, no acanthosis, no dorsocervical fat pad    CAPILLARY BLOOD GLUCOSE      POCT Blood Glucose.: 203 mg/dL (22 Jun 2023 11:37)  POCT Blood Glucose.: 291 mg/dL (22 Jun 2023 00:50)      A1C with Estimated Average Glucose Result: 6.1 % (02-26-23 @ 05:32)  A1C with Estimated Average Glucose Result: 6.9 % (01-17-23 @ 07:10)                            6.9    6.14  )-----------( 116      ( 22 Jun 2023 06:50 )             21.6       06-22    131<L>  |  100  |  71<H>  ----------------------------<  167<H>  5.1   |  16<L>  |  1.89<H>    Ca    8.9      22 Jun 2023 06:50  Phos  5.0     06-22  Mg     1.80     06-22    TPro  6.9  /  Alb  3.6  /  TBili  0.8  /  DBili  x   /  AST  30  /  ALT  50<H>  /  AlkPhos  341<H>  06-22      Thyroid Stimulating Hormone, Serum: 0.68 uIU/mL (06-22-23 @ 01:17)  Thyroid Stimulating Hormone, Serum: 0.71 uIU/mL (06-22-23 @ 00:50)      LIPIDS    RADIOLOGY

## 2023-06-22 NOTE — CONSULT NOTE ADULT - ATTENDING COMMENTS
Ms. Hernandez is a 58 year old female with a PMHx of T2DM, hypothyroidism c/b myxedema coma, HLD, CKD, PAH, asthma who presents with hyperkalemia on outpatient labs to 6.4. Endocrinology consulted for management of T2DM and hypothyroidism. Low suspicion for AI given normal AM cortisol in the past. Continue with LT4 as noted above.

## 2023-06-22 NOTE — ED PROVIDER NOTE - IV ALTEPLASE INCLUSION HIDDEN
show Consent (Scalp)/Introductory Paragraph: SCALP: The rationale for Mohs was explained to the patient and consent was obtained. The risks and benefits of Mohs surgery were discussed in detail. Specifically, the risks of swelling/bruising, scar, infection, bleeding, prolonged wound healing, incomplete removal/recurrence, allergy to anesthesia, nerve injury, numbness, contour/shape change. Also discussed numbness, change in hair growth, contour/shape/indent change, swelling may come forward to forehead/eyes, likely rotation flap to optimize contours and minimize tension and discussed it would likely be larger than might be expected to decrease tension and optimize contour. Prior to the procedure, the treatment site was clearly identified and confirmed by the patient with either a mirror or photograph as documented on the Mohs Map. All questions answered. All components of Universal Protocol/PAUSE Rule completed.

## 2023-06-22 NOTE — H&P ADULT - NSHPREVIEWOFSYSTEMS_GEN_ALL_CORE
Review of Systems:  Constitutional: No fever, No weight loss, poor appetite/po intake  Head: No headache   Eyes: No blurry vision, No diplopia  Neuro: No tremors, No muscle weakness   Cardiovascular: No chest pain, No palpitations  Respiratory: No SOB, No cough  GI: No nausea, No vomiting, No diarrhea  : No dysuria, No hematuria  Skin: No rash  MSK: No joint pain   Psych: No depression  Heme: No abnormal bruising, no abnormal bleeding

## 2023-06-22 NOTE — ED PROVIDER NOTE - CLINICAL SUMMARY MEDICAL DECISION MAKING FREE TEXT BOX
58F with history of DM2, HTN, HLD, CKD (Most recently CKD 3-4), Hypothyroidism ( hx of admission for myxedema coma), Pulmonary Arterial Hypertension with mod-severe TR, and Asthma who presents because of hyperkalemia on outpatient labs to 6.4 (repeated outpatient as well).    Patient reports no other symptoms, has been urinating normally, no new medications, no chest pain, sob, no muscle spasms, weakness, cramping. No recent dietary changes. No fever, chill, n/v/d. Is no longer on insulin, last time using insulin was several months prior.      Concern for worsening CKD with hyperkalemia, will obtain labs. No ekg changes concerning. If hyperK, will give D50 insulin, lokelma, ca gluconate, eventual renal consult (follows [facility] renal outpatient). 58F with history of DM2, HTN, HLD, CKD (Most recently CKD 3-4), Hypothyroidism ( hx of admission for myxedema coma), Pulmonary Arterial Hypertension with mod-severe TR, and Asthma who presents because of hyperkalemia on outpatient labs to 6.4 (repeated outpatient as well).    Patient reports no other symptoms, has been urinating normally, no new medications, no chest pain, sob, no muscle spasms, weakness, cramping. No recent dietary changes. No fever, chill, n/v/d. Is no longer on insulin, last time using insulin was several months prior.      Concern for worsening CKD with hyperkalemia, will obtain labs. No ekg changes concerning. If hyperK, will give D50 insulin, lokelma, ca gluconate, eventual renal consult (follows [facility] renal outpatient).    Patient found to be hypothermic to 95 rectal; BCx, UA UCx, CXR ordered. Put on warming blanket.     Admit to medicine under Dr. Baron.

## 2023-06-22 NOTE — H&P ADULT - PROBLEM SELECTOR PLAN 5
-echo noted severe pulmonary htn and diastolic heart failure, with TR, lung mild crackle appreciated, not on O2  -c/w bumex 1mg BID

## 2023-06-22 NOTE — PATIENT PROFILE ADULT - FALL HARM RISK - HARM RISK INTERVENTIONS

## 2023-06-22 NOTE — H&P ADULT - PROBLEM SELECTOR PLAN 7
-A1C 7.1, complained of neuropathy baseline, bg read at home 100s, not on insulin at home  -ISS,  -endo consult

## 2023-06-22 NOTE — ED ADULT NURSE REASSESSMENT NOTE - NS ED NURSE REASSESS COMMENT FT1
Patient is awake and alert, ambulated to the bathroom with out difficulty.  Patient remains on warming blanket, temp = 98.0 at this time.

## 2023-06-22 NOTE — H&P ADULT - PROBLEM SELECTOR PLAN 10
Diet: CC/DASH   DVT PPT: SCD in a pt with anemia   Dispo: pending clinical work up and improvement, from home.

## 2023-06-22 NOTE — H&P ADULT - PROBLEM SELECTOR PLAN 1
-likely 2/2 CKD acidosis, or AI, or hypothyroidism, resolved after lokema, no ekg changes  -check cortisol level  -650mg Bicarb BID   -consider nephro consult  -endo consulted

## 2023-06-22 NOTE — ED PROVIDER NOTE - OBJECTIVE STATEMENT
58F with history of DM2, HTN, HLD, CKD (Most recently CKD 3-4), Hypothyroidism ( hx of admission for myxedema coma), Pulmonary Arterial Hypertension with mod-severe TR, and Asthma who presents because of hyperkalemia on outpatient labs to 6.4 (repeated outpatient as well).    Patient reports no other symptoms, has been urinating normally, no new medications, no chest pain, sob, no muscle spasms, weakness, cramping. No recent dietary changes. No fever, chill, n/v/d. Is no longer on insulin, last time using insulin was several months prior.

## 2023-06-22 NOTE — H&P ADULT - ATTENDING COMMENTS
Seen and examined by me this morning during rounds, non-toxic looking, no complaints at time of visit, feeling warmer after yudy ca was placed on  Referred by outpatient Endocrinology due to hyperkalemia found on outpatient labs (6.4, EKG with no changes noted)  Hyperkalemia has now resolved after lokelma was given  Chronic blood loss anemia likely due to underlying CKD, Hg on admission of 5.7 (similar to recent adm in 5/2023) w/ no e/o GIB  Will transfuse to keep Hg above 7  CKD stage 3 currently at baseline but with metabolic acidosis, will start sodium bicarbonate PO, f/up BMP closely  DM with A1C of 6.1 in 2/2023, c/w ADRIAN, f/up A1C in AM  C/w hydralazine for HTN  Hypothyroidism w/ h/o myxedema coma, current TSH/FT4 WNL, c/w home dose of synthroid  Hyponatremia likely diuretic related (is chronically low), monitor for now  Hypothermia of unclear etiology, r/o infectious source (but seems unlikely so far), f/up all cultures, procalcitonin, IV vanco/zosyn for now (suspect temperature dysregulation as a result of hypothyroidsm, pt states she is always using extra blankets and a warmer blanket on winter time) but could also be due to underlying anemia, monitor temp curve closely  AM cortisol is WNL as well, Endocrine has been  consulted, apprec recs, can check 8am SERUM cortisol and ACTH but low suspicion for AI  Moderate diastolic dysfunction/severe Pulm HTN, mod-severe TR, euvolemic at present time, c/w home dose of bumex  Rest as above

## 2023-06-22 NOTE — H&P ADULT - HISTORY OF PRESENT ILLNESS
58F with history of DM2, HTN, HLD, CKD (Most recently CKD 3-4), Hypothyroidism ( hx of admission for myxedema coma), Pulmonary Arterial Hypertension with mod-severe TR, and Asthma who presents because of hyperkalemia on outpatient labs to 6.4 (repeated outpatient as well). Her outpt endo found that  her K was high, and asked her to repeat it in the urgent care, which showed consistent hyperkalemia 6.4.     Patient reports no other symptoms, poor appetize recently since last discharge, and feeling cold all the time, associated with hair loss. she fluid restrict herself at home.  has been urinating normally, no new medications, no chest pain, sob, no muscle spasms, weakness, cramping. No recent dietary changes. No fever,n/v/d.  BG at home in th 100s, Is no longer on insulin, last time using insulin was several months prior.    In the ed, bp on arrival is 163/56, temp 36.1, HR 61, saturating on RA at 100%. No ekg changes. K was 6.0. HGB was 6.3, Na was 130. D dimer elevated at 419,

## 2023-06-22 NOTE — ED ADULT NURSE NOTE - OBJECTIVE STATEMENT
59 y/o F presents to ED room 10 A&Ox4 c/o hyperkalemia. pt got call back from endocrinologist office advising pt to go to ED d/t high potassium. pt unsure of exact number, 6.1-6.7. pt denies chest pain, HA, weakness, tremors, pain, abd pain, N/V/D, SOB. no acute distress noted. placed on continuos monitor, NSR noted. respirations even and unlabored. dark scarring patches noted along B/L upper extremities. PMHx CKD, DM (glucose monitor noted to R upper arm), HTN. labs drawn and sent. awaiting results. MD at bedside performing USIV.

## 2023-06-22 NOTE — H&P ADULT - PROBLEM SELECTOR PLAN 4
-baseline is 8, likely 2/2 CKD, GI saw her last admission, no scope was offered since no JANE or overt symptoms  -s/p 2 units PRBC  -daily CBC

## 2023-06-22 NOTE — H&P ADULT - ASSESSMENT
58F with history of DM2, HTN, HLD, CKD (Most recently CKD 3-4), Hypothyroidism ( hx of admission for myxedema coma), Pulmonary Arterial Hypertension with mod-severe TR, and Asthma who presents because of hyperkalemia on outpatient labs to 6.4, likely secondary to CKD related metabolic acidosis, resolved after lokema. also found to be anemic, s/p PRBC *1 unit

## 2023-06-22 NOTE — ED ADULT NURSE REASSESSMENT NOTE - NS ED NURSE REASSESS COMMENT FT1
transfusion complete. pt denies c/p, SOB, weakness, fevers, N/V/D. VS as noted in flowsheet. pt remains hypothermic rectally. Admitting MD Baron paged to be made aware. no acute distress noted. respirations even and unlabored. awaiting bed assignment. safety maintained, side rails up. call bell within reach. transfusion complete. pt denies c/p, SOB, weakness, fevers, N/V/D. VS as noted in flowsheet. pt remains hypothermic rectally. Admitting MD Baron paged and aware. advised RN to keep pt on Beir hugger at this time. pt remains asymptomatic. NSR to sinus bradycardic on monitor at this time. no acute distress noted. respirations even and unlabored. awaiting bed assignment. safety maintained, side rails up. call bell within reach.

## 2023-06-22 NOTE — ED PROVIDER NOTE - IV ALTEPLASE EXCL ABS HIDDEN
Problem: Inadequate oral food/beverage intake (NI-2.1)  Goal: Food and/or Nutrient Delivery  Description: Individualized approach for food/nutrient provision.   Outcome: Ongoing show

## 2023-06-22 NOTE — H&P ADULT - NSHPPHYSICALEXAM_GEN_ALL_CORE
PHYSICAL EXAM    Vital Signs Last 24 Hrs  T(C): 36.7 (22 Jun 2023 08:35), Max: 36.7 (22 Jun 2023 08:35)  T(F): 98 (22 Jun 2023 08:35), Max: 98 (22 Jun 2023 08:35)  HR: 64 (22 Jun 2023 09:15) (58 - 64)  BP: 146/60 (22 Jun 2023 09:15) (146/60 - 181/68)  BP(mean): --  RR: 16 (22 Jun 2023 09:15) (16 - 16)  SpO2: 99% (22 Jun 2023 09:15) (99% - 100%)    Parameters below as of 22 Jun 2023 06:53  Patient On (Oxygen Delivery Method): room air          GENERAL: NAD, lying comfortably in bed   HEAD:  Atraumatic, Normocephalic  EYES: EOMI b/l, PERRLA b/l, conjunctiva and sclera clear  NECK: Supple,  JVD noted, No LAD   CHEST/LUNG: mild crackles  HEART: Regular rate and rhythm; S1 and S2 present, 2/6 systolic murmurs  ABDOMEN: Soft, Nontender, Nondistended; Bowel sounds present  EXTREMITIES:  2+ Peripheral Pulses, No clubbing, cyanosis, or edema  NEURO: AAOx3, non-focal   SKIN: macule hyperpigmentations noticed scattered  in the body , trunk, neck, and extremities, non itchy, non raised. new since last admission   SKIN: No rashes or lesions

## 2023-06-22 NOTE — ED ADULT NURSE REASSESSMENT NOTE - NS ED NURSE REASSESS COMMENT FT1
pt advised she requires blood transfusion. pt remains asymptomatic at this time. remains on continuos monitor, NSR noted. respirations even and unlabored. pt found to be hypothermic rectally at this time. MDs made aware. placed on Beir Hugger at this time. pt educated regarding transfusion reactions. blood transfusion initiated. blood running through 20G to RAC, patent at this time. awaiting reassessment. safety maintained, side rails up. call bell within reach. pt advised she requires blood transfusion. pt remains asymptomatic at this time. 20G placed to L hand. remains on continuos monitor, NSR noted. respirations even and unlabored. pt found to be hypothermic rectally at this time. MDs made aware. placed on Beir Hugger at this time. pt educated regarding transfusion reactions. blood transfusion initiated. blood running through 20G to RAC, patent at this time. awaiting reassessment. safety maintained, side rails up. call bell within reach.

## 2023-06-22 NOTE — ED PROVIDER NOTE - NS ED ROS FT
CONSTITUTIONAL:  No weight loss, fever, chills, weakness or fatigue.  HEENT:  Eyes:  No visual loss, blurred vision, double vision or yellow sclerae. Ears, Nose, Throat:  No hearing loss, sneezing, congestion, runny nose or sore throat.  SKIN:  No rash or itching.  CARDIOVASCULAR:  No chest pain, chest pressure or chest discomfort. No palpitations.  RESPIRATORY:  No shortness of breath, cough or sputum.  GASTROINTESTINAL:  No anorexia, nausea, vomiting or diarrhea. No abdominal pain or blood.  GENITOURINARY:  Denies hematuria, dysuria.   NEUROLOGICAL:  No headache, dizziness, syncope, paralysis, ataxia, numbness or tingling in the extremities. No change in bowel or bladder control.  MUSCULOSKELETAL:  No muscle, back pain, joint pain or stiffness.  HEMATOLOGIC:  No anemia, bleeding or bruising.  LYMPHATICS:  No enlarged nodes.   PSYCHIATRIC:  No history of depression or anxiety.  ENDOCRINOLOGIC:  + feeling cold   ALLERGIES:  No history of asthma, hives, eczema or rhinitis.

## 2023-06-22 NOTE — H&P ADULT - TIME BILLING
Bedside exam and interview   Reviewed vitals, labs, consultant notes  Discussed patient's plan of care with housestaff, consultants, patient and family  Documentation of encounter

## 2023-06-22 NOTE — H&P ADULT - PROBLEM SELECTOR PLAN 3
-2/2 possible infection or lower set point s/p myxedema coma  -RVP neg, f/u blood culture urine culture, UA neg  -f/u procal   -vanc and zosyn empirically   -yudy livingston as needed  -levothyroxine resumed

## 2023-06-22 NOTE — ED PROVIDER NOTE - PHYSICAL EXAMINATION
GENERAL APPEARANCE: Well developed, NAD  HEENT:  PERRL, EOMI. hearing grossly intact.  NECK: Neck supple, non-tender no lymphadenopathy, masses or thyromegaly.  CARDIAC: + holosystolic murmur/rumble appreciated in LL sternal border, Normal S1 and S2. RRR  LUNGS: Clear to auscultation B/L, no rales, rhonchi, or wheezing  ABDOMEN: Soft , NTND, bowel sounds normal. No guarding or rebound.   MUSCULOSKELETAL: ROM intact.  No joint erythema or tenderness.   EXTREMITIES: No edema. Peripheral pulses intact.   NEUROLOGICAL: Non focal. Strength and sensation symmetric and intact throughout.   SKIN: Warm and dry , Well perfused  PSYCHIATRIC: AOx4, Normal mood and affect

## 2023-06-22 NOTE — H&P ADULT - NSHPLABSRESULTS_GEN_ALL_CORE
.  LABS:                         6.9    6.14  )-----------( 116      ( 2023 06:50 )             21.6         131<L>  |  100  |  71<H>  ----------------------------<  167<H>  5.1   |  16<L>  |  1.89<H>    Ca    8.9      2023 06:50  Phos  5.0       Mg     1.80         TPro  6.9  /  Alb  3.6  /  TBili  0.8  /  DBili  x   /  AST  30  /  ALT  50<H>  /  AlkPhos  341<H>      PT/INR - ( 2023 06:50 )   PT: 13.4 sec;   INR: 1.15 ratio         PTT - ( 2023 06:50 )  PTT:35.2 sec  Urinalysis Basic - ( 2023 09:00 )    Color: Light Yellow / Appearance: Clear / S.008 / pH: x  Gluc: x / Ketone: Negative  / Bili: Negative / Urobili: <2 mg/dL   Blood: x / Protein: 100 mg/dL / Nitrite: Negative   Leuk Esterase: Negative / RBC: 3 /HPF / WBC 1 /HPF   Sq Epi: x / Non Sq Epi: x / Bacteria: Negative

## 2023-06-23 ENCOUNTER — TRANSCRIPTION ENCOUNTER (OUTPATIENT)
Age: 58
End: 2023-06-23

## 2023-06-23 VITALS — DIASTOLIC BLOOD PRESSURE: 74 MMHG | SYSTOLIC BLOOD PRESSURE: 162 MMHG

## 2023-06-23 LAB
A1C WITH ESTIMATED AVERAGE GLUCOSE RESULT: 6.3 % — HIGH (ref 4–5.6)
ACTH SER-ACNC: 45 PG/ML — SIGNIFICANT CHANGE UP (ref 7.2–63.3)
ANION GAP SERPL CALC-SCNC: 15 MMOL/L — HIGH (ref 7–14)
BUN SERPL-MCNC: 70 MG/DL — HIGH (ref 7–23)
CALCIUM SERPL-MCNC: 9.2 MG/DL — SIGNIFICANT CHANGE UP (ref 8.4–10.5)
CHLORIDE SERPL-SCNC: 104 MMOL/L — SIGNIFICANT CHANGE UP (ref 98–107)
CO2 SERPL-SCNC: 16 MMOL/L — LOW (ref 22–31)
CORTIS AM PEAK SERPL-MCNC: 12.1 UG/DL — SIGNIFICANT CHANGE UP (ref 6–18.4)
CREAT SERPL-MCNC: 2.09 MG/DL — HIGH (ref 0.5–1.3)
CULTURE RESULTS: SIGNIFICANT CHANGE UP
EGFR: 27 ML/MIN/1.73M2 — LOW
ESTIMATED AVERAGE GLUCOSE: 134 — SIGNIFICANT CHANGE UP
GLUCOSE BLDC GLUCOMTR-MCNC: 169 MG/DL — HIGH (ref 70–99)
GLUCOSE BLDC GLUCOMTR-MCNC: 283 MG/DL — HIGH (ref 70–99)
GLUCOSE BLDC GLUCOMTR-MCNC: 88 MG/DL — SIGNIFICANT CHANGE UP (ref 70–99)
GLUCOSE SERPL-MCNC: 83 MG/DL — SIGNIFICANT CHANGE UP (ref 70–99)
HCT VFR BLD CALC: 26.3 % — LOW (ref 34.5–45)
HGB BLD-MCNC: 8.4 G/DL — LOW (ref 11.5–15.5)
MAGNESIUM SERPL-MCNC: 1.8 MG/DL — SIGNIFICANT CHANGE UP (ref 1.6–2.6)
MCHC RBC-ENTMCNC: 25.8 PG — LOW (ref 27–34)
MCHC RBC-ENTMCNC: 31.9 GM/DL — LOW (ref 32–36)
MCV RBC AUTO: 80.9 FL — SIGNIFICANT CHANGE UP (ref 80–100)
MRSA PCR RESULT.: SIGNIFICANT CHANGE UP
NRBC # BLD: 0 /100 WBCS — SIGNIFICANT CHANGE UP (ref 0–0)
NRBC # FLD: 0 K/UL — SIGNIFICANT CHANGE UP (ref 0–0)
PHOSPHATE SERPL-MCNC: 5.1 MG/DL — HIGH (ref 2.5–4.5)
PLATELET # BLD AUTO: 105 K/UL — LOW (ref 150–400)
POTASSIUM SERPL-MCNC: 5.2 MMOL/L — SIGNIFICANT CHANGE UP (ref 3.5–5.3)
POTASSIUM SERPL-SCNC: 5.2 MMOL/L — SIGNIFICANT CHANGE UP (ref 3.5–5.3)
RBC # BLD: 3.25 M/UL — LOW (ref 3.8–5.2)
RBC # FLD: 15.6 % — HIGH (ref 10.3–14.5)
S AUREUS DNA NOSE QL NAA+PROBE: DETECTED
SODIUM SERPL-SCNC: 135 MMOL/L — SIGNIFICANT CHANGE UP (ref 135–145)
SPECIMEN SOURCE: SIGNIFICANT CHANGE UP
VANCOMYCIN FLD-MCNC: 7.8 UG/ML — SIGNIFICANT CHANGE UP
WBC # BLD: 6.7 K/UL — SIGNIFICANT CHANGE UP (ref 3.8–10.5)
WBC # FLD AUTO: 6.7 K/UL — SIGNIFICANT CHANGE UP (ref 3.8–10.5)

## 2023-06-23 PROCEDURE — 99239 HOSP IP/OBS DSCHRG MGMT >30: CPT | Mod: GC

## 2023-06-23 RX ORDER — REPAGLINIDE 1 MG/1
1 TABLET ORAL
Qty: 90 | Refills: 2
Start: 2023-06-23 | End: 2023-09-20

## 2023-06-23 RX ORDER — HYDRALAZINE HCL 50 MG
1 TABLET ORAL
Qty: 90 | Refills: 2
Start: 2023-06-23 | End: 2023-09-20

## 2023-06-23 RX ORDER — HYDRALAZINE HCL 50 MG
100 TABLET ORAL EVERY 8 HOURS
Refills: 0 | Status: DISCONTINUED | OUTPATIENT
Start: 2023-06-23 | End: 2023-06-23

## 2023-06-23 RX ORDER — OMEPRAZOLE 10 MG/1
1 CAPSULE, DELAYED RELEASE ORAL
Qty: 0 | Refills: 0 | DISCHARGE

## 2023-06-23 RX ORDER — HYDRALAZINE HCL 50 MG
50 TABLET ORAL ONCE
Refills: 0 | Status: COMPLETED | OUTPATIENT
Start: 2023-06-23 | End: 2023-06-23

## 2023-06-23 RX ORDER — SODIUM BICARBONATE 1 MEQ/ML
1 SYRINGE (ML) INTRAVENOUS
Qty: 90 | Refills: 2
Start: 2023-06-23 | End: 2023-09-20

## 2023-06-23 RX ADMIN — CHLORHEXIDINE GLUCONATE 1 APPLICATION(S): 213 SOLUTION TOPICAL at 12:46

## 2023-06-23 RX ADMIN — PIPERACILLIN AND TAZOBACTAM 25 GRAM(S): 4; .5 INJECTION, POWDER, LYOPHILIZED, FOR SOLUTION INTRAVENOUS at 02:19

## 2023-06-23 RX ADMIN — Medication 125 MICROGRAM(S): at 06:34

## 2023-06-23 RX ADMIN — Medication 1: at 18:14

## 2023-06-23 RX ADMIN — BUMETANIDE 1 MILLIGRAM(S): 0.25 INJECTION INTRAMUSCULAR; INTRAVENOUS at 17:09

## 2023-06-23 RX ADMIN — Medication 650 MILLIGRAM(S): at 17:10

## 2023-06-23 RX ADMIN — Medication 3: at 12:44

## 2023-06-23 RX ADMIN — BUMETANIDE 1 MILLIGRAM(S): 0.25 INJECTION INTRAMUSCULAR; INTRAVENOUS at 06:35

## 2023-06-23 RX ADMIN — Medication 50 MILLIGRAM(S): at 06:35

## 2023-06-23 RX ADMIN — Medication 650 MILLIGRAM(S): at 06:35

## 2023-06-23 RX ADMIN — Medication 50 MILLIGRAM(S): at 14:46

## 2023-06-23 RX ADMIN — PIPERACILLIN AND TAZOBACTAM 25 GRAM(S): 4; .5 INJECTION, POWDER, LYOPHILIZED, FOR SOLUTION INTRAVENOUS at 10:46

## 2023-06-23 RX ADMIN — Medication 50 MILLIGRAM(S): at 16:50

## 2023-06-23 NOTE — DISCHARGE NOTE PROVIDER - NSDCFUSCHEDAPPT_GEN_ALL_CORE_FT
Jonel Coyle  [facility] Physician [facility]  [address] [place] [address]   Scheduled Appointment: 07/21/2023

## 2023-06-23 NOTE — DISCHARGE NOTE PROVIDER - NSDCMRMEDTOKEN_GEN_ALL_CORE_FT
atorvastatin 40 mg oral tablet: 1 tab(s) orally once a day (at bedtime)  bumetanide 1 mg oral tablet: 1 tab(s) orally 2 times a day  hydrALAZINE 50 mg oral tablet: 1 tab(s) orally every 8 hours  levothyroxine 125 mcg (0.125 mg) oral tablet: 1 tab(s) orally once a day  omeprazole 20 mg oral delayed release tablet: 1 tab(s) orally once a day  Prandin 1 mg oral tablet: 0.5 tab(s) orally 3 times a day (before meals) (Pharmacy filled for 1 tablet PO TID).   atorvastatin 40 mg oral tablet: 1 tab(s) orally once a day (at bedtime)  bumetanide 1 mg oral tablet: 1 tab(s) orally 2 times a day  hydrALAZINE 50 mg oral tablet: 1 tab(s) orally every 8 hours  levothyroxine 125 mcg (0.125 mg) oral tablet: 1 tab(s) orally once a day  repaglinide 0.5 mg oral tablet: 1 tab(s) orally 3 times a day  sodium bicarbonate 650 mg oral tablet: 1 tab(s) orally 3 times a day   atorvastatin 40 mg oral tablet: 1 tab(s) orally once a day (at bedtime)  bumetanide 1 mg oral tablet: 1 tab(s) orally 2 times a day  hydrALAZINE 100 mg oral tablet: 1 tab(s) orally every 8 hours  levothyroxine 125 mcg (0.125 mg) oral tablet: 1 tab(s) orally once a day  repaglinide 0.5 mg oral tablet: 1 tab(s) orally 3 times a day  sodium bicarbonate 650 mg oral tablet: 1 tab(s) orally 3 times a day

## 2023-06-23 NOTE — PROGRESS NOTE ADULT - PROBLEM SELECTOR PLAN 2
-initial VBG ph 7.27, bicarb 17, improving  -sodium bicarb 650 bid -initial VBG ph 7.27, bicarb 17, improving  -sodium bicarb 650 TID

## 2023-06-23 NOTE — PROVIDER CONTACT NOTE (OTHER) - REASON
BP Elevated
BP High
elevated blood pressure
pt Hypertensive
Patient's /72, okay to give PO hydralazine 50mg?

## 2023-06-23 NOTE — DISCHARGE NOTE NURSING/CASE MANAGEMENT/SOCIAL WORK - PATIENT PORTAL LINK FT
You can access the FollowMyHealth Patient Portal offered by [facility] by registering at the following website: [email or web address]. By joining [facility] FollowMyHealth portal, you will also be able to view your health information using other applications (apps) compatible with our system.

## 2023-06-23 NOTE — PROGRESS NOTE ADULT - PROBLEM SELECTOR PLAN 10
Diet: CC/DASH   DVT PPT: SCD in a pt with anemia   Dispo: pending clinical work up and improvement, from home. Diet: CC/DASH   DVT PPT: SCD in a pt with anemia   Dispo: can be discharge home

## 2023-06-23 NOTE — PROGRESS NOTE ADULT - ATTENDING COMMENTS
Seen and examined by me this afternoon, doing well, wants to go home, normal temperature  Referred by outpatient Endocrinology due to hyperkalemia found on outpatient labs (6.4, EKG with no changes noted)  Hyperkalemia has now resolved after lokelma was given  Chronic blood loss anemia likely due to underlying CKD, Hg on admission of 5.7 (similar to recent adm in 5/2023) w/ no e/o GIB, s/p 2U PRBC with appropriate response  CKD stage 3 currently at baseline but with metabolic acidosis, will increase sodium bicarbonate to TID, to be continued on DC  DM with A1C of 6.3, well controlled, c/w ADRIAN, apprec Endocrine recs, decreasing home dose of prandin to 0.5mg TI  C/w hydralazine for HTN, dose increased to 100mg TID as with uncontrolled BP today (and pt states that her BP is sometimes elevated)  Hypothyroidism w/ h/o myxedema coma, current TSH/FT4 WNL, c/w home dose of synthroid  Hyponatremia likely diuretic related (is chronically low), monitor for now (normalized today)  Hypothermia likely due to anemia/underlying hypothyroidism, no e/o infection so far, negative UA/BCx's and procalcitonin is low, ok to DC Abx  AM cortisol is WNL/ACTH is WNL, no e/o AI either, apprec Endocrine recs  Moderate diastolic dysfunction/severe Pulm HTN, mod-severe TR, euvolemic at present time, c/w home dose of bumex  Planned for discharge home today with PMD, Endocrine, Pulmonary, Cards and Renal f/up as outpatient  Rest as above

## 2023-06-23 NOTE — PROGRESS NOTE ADULT - ASSESSMENT
58F with history of DM2, HTN, HLD, CKD (Most recently CKD 3-4), Hypothyroidism ( hx of admission for myxedema coma), Pulmonary Arterial Hypertension with mod-severe TR, and Asthma who presents because of hyperkalemia on outpatient labs to 6.4, likely secondary to CKD related metabolic acidosis, resolved after lokema. also found to be anemic, s/p PRBC *1 unit 58F with history of DM2, HTN, HLD, CKD (Most recently CKD 3-4), Hypothyroidism ( hx of admission for myxedema coma), Pulmonary Arterial Hypertension with mod-severe TR, and Asthma who presents because of hyperkalemia on outpatient labs to 6.4, likely secondary to CKD related metabolic acidosis, resolved after lokema. also found to be anemic, s/p PRBC *2 unit

## 2023-06-23 NOTE — PROGRESS NOTE ADULT - SUBJECTIVE AND OBJECTIVE BOX
PROGRESS NOTE:   Authored by Dr. Robbie Moeller  Patient is a 58y old  Female who presents with a chief complaint of hyperkalemia (22 Jun 2023 13:14)      SUBJECTIVE / OVERNIGHT EVENTS:    MEDICATIONS  (STANDING):  atorvastatin 40 milliGRAM(s) Oral at bedtime  buMETAnide 1 milliGRAM(s) Oral two times a day  chlorhexidine 2% Cloths 1 Application(s) Topical daily  dextrose 50% Injectable 25 Gram(s) IV Push once  dextrose 50% Injectable 12.5 Gram(s) IV Push once  dextrose 50% Injectable 25 Gram(s) IV Push once  dextrose Oral Gel 15 Gram(s) Oral once  glucagon  Injectable 1 milliGRAM(s) IntraMuscular once  hydrALAZINE 50 milliGRAM(s) Oral every 8 hours  insulin lispro (ADMELOG) corrective regimen sliding scale   SubCutaneous three times a day before meals  insulin lispro (ADMELOG) corrective regimen sliding scale   SubCutaneous at bedtime  levothyroxine 125 MICROGram(s) Oral daily  piperacillin/tazobactam IVPB.. 3.375 Gram(s) IV Intermittent every 8 hours  sodium bicarbonate 650 milliGRAM(s) Oral two times a day    MEDICATIONS  (PRN):  acetaminophen     Tablet .. 650 milliGRAM(s) Oral every 6 hours PRN Temp greater or equal to 38C (100.4F), Mild Pain (1 - 3)  aluminum hydroxide/magnesium hydroxide/simethicone Suspension 30 milliLiter(s) Oral every 4 hours PRN Dyspepsia  melatonin 3 milliGRAM(s) Oral at bedtime PRN Insomnia  ondansetron Injectable 4 milliGRAM(s) IV Push every 8 hours PRN Nausea and/or Vomiting      CAPILLARY BLOOD GLUCOSE      POCT Blood Glucose.: 188 mg/dL (22 Jun 2023 22:52)  POCT Blood Glucose.: 151 mg/dL (22 Jun 2023 16:43)  POCT Blood Glucose.: 203 mg/dL (22 Jun 2023 11:37)    I&O's Summary      PHYSICAL EXAM:  Vital Signs Last 24 Hrs  T(C): 36.2 (23 Jun 2023 06:15), Max: 36.8 (22 Jun 2023 17:40)  T(F): 97.1 (23 Jun 2023 06:15), Max: 98.3 (22 Jun 2023 17:40)  HR: 62 (23 Jun 2023 06:15) (60 - 75)  BP: 149/61 (23 Jun 2023 06:15) (146/60 - 206/65)  BP(mean): --  RR: 18 (23 Jun 2023 06:15) (16 - 18)  SpO2: 100% (23 Jun 2023 06:15) (99% - 100%)    Parameters below as of 23 Jun 2023 06:15  Patient On (Oxygen Delivery Method): room air        GENERAL: NAD, lying comfortably in bed  HEAD: Atraumatic, normocephalic  EYES: EOMI b/l PERRLA b/l, conjunctiva and sclera clear  NECK: Supple, No JVD, No LAD  RESPIRATORY: Normal respiratory effort; lungs are clear to auscultation bilaterally  CARDIOVASCULAR: Regular rate and rhythm, normal S1 and S2, no murmur/rub/gallop; No lower extremity edema  ABDOMEN: Nontender, normoactive bowel sounds, no rebound/guarding; No hepatosplenomegaly  MUSCULOSKELETAL: no clubbing or cyanosis of digits; no joint swelling or tenderness to palpation  NEURO: Non focal   PSYCH: A+O to person, place, and time; affect appropriate     PROGRESS NOTE:   Authored by Dr. Robbie Moeller  Patient is a 58y old  Female who presents with a chief complaint of hyperkalemia (22 Jun 2023 13:14)      SUBJECTIVE / OVERNIGHT EVENTS:  pt denied any overt symptoms, such as sob or cp.     MEDICATIONS  (STANDING):  atorvastatin 40 milliGRAM(s) Oral at bedtime  buMETAnide 1 milliGRAM(s) Oral two times a day  chlorhexidine 2% Cloths 1 Application(s) Topical daily  dextrose 50% Injectable 25 Gram(s) IV Push once  dextrose 50% Injectable 12.5 Gram(s) IV Push once  dextrose 50% Injectable 25 Gram(s) IV Push once  dextrose Oral Gel 15 Gram(s) Oral once  glucagon  Injectable 1 milliGRAM(s) IntraMuscular once  hydrALAZINE 50 milliGRAM(s) Oral every 8 hours  insulin lispro (ADMELOG) corrective regimen sliding scale   SubCutaneous three times a day before meals  insulin lispro (ADMELOG) corrective regimen sliding scale   SubCutaneous at bedtime  levothyroxine 125 MICROGram(s) Oral daily  piperacillin/tazobactam IVPB.. 3.375 Gram(s) IV Intermittent every 8 hours  sodium bicarbonate 650 milliGRAM(s) Oral two times a day    MEDICATIONS  (PRN):  acetaminophen     Tablet .. 650 milliGRAM(s) Oral every 6 hours PRN Temp greater or equal to 38C (100.4F), Mild Pain (1 - 3)  aluminum hydroxide/magnesium hydroxide/simethicone Suspension 30 milliLiter(s) Oral every 4 hours PRN Dyspepsia  melatonin 3 milliGRAM(s) Oral at bedtime PRN Insomnia  ondansetron Injectable 4 milliGRAM(s) IV Push every 8 hours PRN Nausea and/or Vomiting      CAPILLARY BLOOD GLUCOSE      POCT Blood Glucose.: 188 mg/dL (22 Jun 2023 22:52)  POCT Blood Glucose.: 151 mg/dL (22 Jun 2023 16:43)  POCT Blood Glucose.: 203 mg/dL (22 Jun 2023 11:37)    I&O's Summary      PHYSICAL EXAM:  Vital Signs Last 24 Hrs  T(C): 36.2 (23 Jun 2023 06:15), Max: 36.8 (22 Jun 2023 17:40)  T(F): 97.1 (23 Jun 2023 06:15), Max: 98.3 (22 Jun 2023 17:40)  HR: 62 (23 Jun 2023 06:15) (60 - 75)  BP: 149/61 (23 Jun 2023 06:15) (146/60 - 206/65)  BP(mean): --  RR: 18 (23 Jun 2023 06:15) (16 - 18)  SpO2: 100% (23 Jun 2023 06:15) (99% - 100%)    Parameters below as of 23 Jun 2023 06:15  Patient On (Oxygen Delivery Method): room air        GENERAL: NAD, lying comfortably in bed  HEAD: Atraumatic, normocephalic  EYES: EOMI b/l PERRLA b/l, conjunctiva and sclera clear  NECK: Supple, No JVD, No LAD  RESPIRATORY: Normal respiratory effort; lungs are clear to auscultation bilaterally  CARDIOVASCULAR: Regular rate and rhythm, normal S1 and S2, 2/6 systolic murmur; No lower extremity edema  ABDOMEN: Nontender, normoactive bowel sounds, no rebound/guarding; No hepatosplenomegaly  MUSCULOSKELETAL: no clubbing or cyanosis of digits; no joint swelling or tenderness to palpation  NEURO: Non focal   PSYCH: A+O to person, place, and time; affect appropriate        LABS:                        8.4    6.70  )-----------( 105      ( 23 Jun 2023 07:53 )             26.3     06-23    135  |  104  |  70<H>  ----------------------------<  83  5.2   |  16<L>  |  2.09<H>    Ca    9.2      23 Jun 2023 07:53  Phos  5.1     06-23  Mg     1.80     06-23    TPro  6.9  /  Alb  3.6  /  TBili  0.8  /  DBili  x   /  AST  30  /  ALT  50<H>  /  AlkPhos  341<H>  06-22    PT/INR - ( 22 Jun 2023 06:50 )   PT: 13.4 sec;   INR: 1.15 ratio         PTT - ( 22 Jun 2023 06:50 )  PTT:35.2 sec      Urinalysis Basic - ( 23 Jun 2023 07:53 )    Color: x / Appearance: x / SG: x / pH: x  Gluc: 83 mg/dL / Ketone: x  / Bili: x / Urobili: x   Blood: x / Protein: x / Nitrite: x   Leuk Esterase: x / RBC: x / WBC x   Sq Epi: x / Non Sq Epi: x / Bacteria: x        Culture - Blood (collected 22 Jun 2023 06:50)  Source: .Blood Blood-Peripheral  Preliminary Report (23 Jun 2023 09:01):    No growth to date.    Culture - Blood (collected 22 Jun 2023 06:35)  Source: .Blood Blood-Venous  Preliminary Report (23 Jun 2023 09:01):    No growth to date.

## 2023-06-23 NOTE — DISCHARGE NOTE PROVIDER - HOSPITAL COURSE
HPI:  58F with history of DM2, HTN, HLD, CKD (Most recently CKD 3-4), Hypothyroidism ( hx of admission for myxedema coma), Pulmonary Arterial Hypertension with mod-severe TR, and Asthma who presents because of hyperkalemia on outpatient labs to 6.4 (repeated outpatient as well). Her outpt endo found that  her K was high, and asked her to repeat it in the urgent care, which showed consistent hyperkalemia 6.4.     Patient reports no other symptoms, poor appetize recently since last discharge, and feeling cold all the time, associated with hair loss. she fluid restrict herself at home.  has been urinating normally, no new medications, no chest pain, sob, no muscle spasms, weakness, cramping. No recent dietary changes. No fever,n/v/d.  BG at home in th 100s, Is no longer on insulin, last time using insulin was several months prior.    In the ed, bp on arrival is 163/56, temp 36.1, HR 61, saturating on RA at 100%. No ekg changes. K was 6.0. HGB was 6.3, Na was 130. D dimer elevated at 419,    (22 Jun 2023 12:51)    Hospital course:  On admission, her K was normalized after lokema and bicarbs, she is put on bicarb 650mg BID which will be increased to TID upon dc. She had episodes of hypothermia, vanc and zosyn was started. due to concern for some underlying endocrinopathy, we consulted endo, which didn't think the symptoms were related to thyroid an adrenal gland, her labs improved, she is deemed medically optimized for discharge with endo follow up. 58F with history of DM2, HTN, HLD, CKD (Most recently CKD 3-4), Hypothyroidism (hx of admission for myxedema coma), Pulmonary Arterial Hypertension with mod-severe TR, and asthma who presented due to hyperkalemia found on outpatient labs of 6.4, she was sent in by her endocrinologist. Patient reported no new symptoms on admission She has had poor appetize recently since last discharge and feels cold all the time, She denies new medications, chest pain, sob, no muscle spasms, weakness, cramping. No recent dietary changes. No fever,n/v/d.     In the ed, bp on arrival is 163/56, temp 36.1, HR 61, saturating on RA at 100%. No EKG changes. K was 6.0. HGB was 6.3, Na was 130. D dimer elevated at 419. Patient was given a blood transfusion, as well as lokelma. Her potassium and CBC normalized. She was additionally iniated on 650 mg bicarb TID given her stage 4 renal disease. She had episodes of hypothermia, vanc and zosyn was started. However given negative blood cultures and negative procalcitonin, suspicion for infection was low and antibiotics were discontinued. Due to concern for some underlying endocrinopathy, we consulted endo, who didn't think the symptoms were related to thyroid an adrenal gland. They recommended decreasing her home prandin dose from 1mg TID to 0.5 mg TID.    She is deemed medically optimized for discharge with endo follow up. 58F with history of DM2, HTN, HLD, CKD (Most recently CKD 3-4), Hypothyroidism (hx of admission for myxedema coma), Pulmonary Arterial Hypertension with mod-severe TR, and asthma who presented due to hyperkalemia found on outpatient labs of 6.4, she was sent in by her endocrinologist. Patient reported no new symptoms on admission She has had poor appetize recently since last discharge and feels cold all the time, She denies new medications, chest pain, sob, no muscle spasms, weakness, cramping. No recent dietary changes. No fever,n/v/d.     In the ed, bp on arrival is 163/56, temp 36.1, HR 61, saturating on RA at 100%. No EKG changes. K was 6.0. HGB was 6.3, Na was 130. D dimer elevated at 419. Patient was given a blood transfusion, as well as lokelma. Her potassium and CBC normalized. She was additionally iniated on 650 mg bicarb TID given her stage 4 renal disease. She had episodes of hypothermia, vanc and zosyn was started. However given negative blood cultures and negative procalcitonin, suspicion for infection was low and antibiotics were discontinued. Due to concern for some underlying endocrinopathy, we consulted endo, who didn't think the symptoms were related to thyroid an adrenal gland. They recommended decreasing her home prandin dose from 1mg TID to 0.5 mg TID. Patient was hypertensive, her home hydralazine was increased from 50q8 to 100q8.    She is deemed medically optimized for discharge with endo follow up.

## 2023-06-23 NOTE — DISCHARGE NOTE PROVIDER - NSDCCPCAREPLAN_GEN_ALL_CORE_FT
PRINCIPAL DISCHARGE DIAGNOSIS  Diagnosis: Anemia  Assessment and Plan of Treatment: You came in with low blood count, we think its related to your chronic kidney disease, we gave you two units of blood. and please follow up with your nephrologist for further managment options. if you experience any weakness or dizziness, please return to ED.      SECONDARY DISCHARGE DIAGNOSES  Diagnosis: Hyperkalemia  Assessment and Plan of Treatment: You came in with high potassium, for which we treated you with a medication called lokema, and your potassium quickly normalized, we think it might be related to your blood acidity which is caused by kidney disease, if you have any future symptoms of high potassium, please return to ED.  please follow up with your nephrologist and endocrinologist for further management    Diagnosis: Hypothermia  Assessment and Plan of Treatment: you came in with low temp, we consulted our endocrinologist, who didn't think it was related to your thyroid or adrenal gland, you have anemia, and low set point might be related to your coma you had last year, we briefly put you on antibiotics, you temperature quickly improved. If you have any signs of infection, please return to ED.     PRINCIPAL DISCHARGE DIAGNOSIS  Diagnosis: Anemia  Assessment and Plan of Treatment: You came in with low blood count, we think its related to your chronic kidney disease, we gave you two units of blood. and please follow up with your nephrologist for further managment options. If you experience any weakness or dizziness, please return to ED.      SECONDARY DISCHARGE DIAGNOSES  Diagnosis: Hyperkalemia  Assessment and Plan of Treatment: You came in with high potassium, for which we treated you with a medication called lokema, and your potassium quickly normalized, we think it might be related to your blood acidity which is caused by kidney disease, if you have any future symptoms of high potassium, please return to ED.  Please follow up with your nephrologist and endocrinologist for further management    Diagnosis: Hypothermia  Assessment and Plan of Treatment: You came in with low temp, we consulted our endocrinologist, who didn't think it was related to your thyroid or adrenal gland. The low temperature may be related to your anemia or hypothyrodisim.   We briefly put you on antibiotics, however you did not have any signs of infection so these were discontinued. Your temperature quickly improved. If you have any signs of infection such as fevers, worsening chills, shortness of breath please return to ED.     PRINCIPAL DISCHARGE DIAGNOSIS  Diagnosis: Anemia  Assessment and Plan of Treatment: You came in with low blood count, we think its related to your chronic kidney disease, we gave you two units of blood. and please follow up with your nephrologist for further managment options. If you experience any weakness or dizziness, please return to ED.      SECONDARY DISCHARGE DIAGNOSES  Diagnosis: Hyperkalemia  Assessment and Plan of Treatment: You came in with high potassium, for which we treated you with a medication called lokema, and your potassium quickly normalized, we think it might be related to your blood acidity which is caused by kidney disease, if you have any future symptoms of high potassium, please return to ED.  Please follow up with your nephrologist and endocrinologist for further management    Diagnosis: Hypothermia  Assessment and Plan of Treatment: You came in with low temp, we consulted our endocrinologist, who didn't think it was related to your thyroid or adrenal gland. The low temperature may be related to your anemia or hypothyrodisim.   We briefly put you on antibiotics, however you did not have any signs of infection so these were discontinued. Your temperature quickly improved. If you have any signs of infection such as fevers, worsening chills, shortness of breath please return to ED.    Diagnosis: HTN (hypertension)  Assessment and Plan of Treatment: Your blood pressure was high while you were admitted. We increased your blood pressure medication from 50 mg hydralazine to 100 mg 3 times a day. Please follow up with your primary care provider. If you start to feel dizzy, faint, have severe headaches, or start to have numbness/loss of function in your limbs please return to the hospital

## 2023-06-23 NOTE — DISCHARGE NOTE NURSING/CASE MANAGEMENT/SOCIAL WORK - NSDCPEFALRISK_GEN_ALL_CORE
For information on Fall & Injury Prevention, visit: [email or web address] OR  [email or web address] OR  https://www.cdc.gov/steadi/patient.html

## 2023-06-23 NOTE — PROVIDER CONTACT NOTE (OTHER) - BACKGROUND
Anemia, HTN, DM
HTN, Anemia, Hyperkalemia
Anemia
Anemia, HTN, Hyperkalemia
Anemia, Hyperkalemia, HTN, DM

## 2023-06-23 NOTE — PROVIDER CONTACT NOTE (OTHER) - SITUATION
Patient's /72, okay to give PO hydralazine 50mg?
Pt BP re-checked 1 hr post Hydralazine IVP and is 184/72 manually
Pt BP re-checked after the Hydralazine was given and is still elevated manually at 196/78
Pt blood pressure taken before giving the Hydralazine PO and is elevated 184/63
Pt received from the ED as a new admission to the unit, BP is 190/78 manual, RN in the ED called when pt on her way that her pressure was 202/75 and the provider wants pt transferred & given meds

## 2023-06-23 NOTE — PROVIDER CONTACT NOTE (OTHER) - ACTION/TREATMENT ORDERED:
Provider notified will order a dose of 5mg IVP Hydralazine
Provider notified, will order additional BP meds- Hydralazine 50mg PO to be given now and increasing home dose
STAT dose of Hydralazine 10mg IVP was ordered to be given on the floor, Provider is aware, re-check BP in one hour
Team 7 Jerald Daley notified. Okay to give bedtime 50mg PO hydralazine.
Provider notified, Hydralazine 50mg PO given, Dr. Llamas at bedside and aware, repeat BP in one hour

## 2023-06-23 NOTE — PROVIDER CONTACT NOTE (OTHER) - ASSESSMENT
Pt is settled into bed, no c/o of chest pain or headache, pt reports her BP has been running higher than usual today
Pt is sitting up in bed, no symptoms or complaints, BP has been running high
Pt resting comfortably, no complaints
Pt resting in bed, pressures have been running on the higher side, no c/o pain or symptoms, due for the 2pm Hydralazine 50mg PO
Patient's /72, okay to give PO hydralazine 50mg? Received IVP hydralazine at 19:37.

## 2023-06-23 NOTE — DISCHARGE NOTE PROVIDER - NSFOLLOWUPCLINICS_GEN_ALL_ED_FT
[facility]  Endocrinology  [address] [place] [address]  Phone: [phone]  Fax:   Scheduled Appointment: 9/26/2023 2:00 PM

## 2023-06-23 NOTE — PROGRESS NOTE ADULT - SUBJECTIVE AND OBJECTIVE BOX
ENDOCRINE FOLLOW UP     Chief Complaint: hypothyroidism    History:     MEDICATIONS  (STANDING):  atorvastatin 40 milliGRAM(s) Oral at bedtime  buMETAnide 1 milliGRAM(s) Oral two times a day  chlorhexidine 2% Cloths 1 Application(s) Topical daily  dextrose 50% Injectable 25 Gram(s) IV Push once  dextrose 50% Injectable 12.5 Gram(s) IV Push once  dextrose 50% Injectable 25 Gram(s) IV Push once  dextrose Oral Gel 15 Gram(s) Oral once  glucagon  Injectable 1 milliGRAM(s) IntraMuscular once  hydrALAZINE 50 milliGRAM(s) Oral every 8 hours  insulin lispro (ADMELOG) corrective regimen sliding scale   SubCutaneous three times a day before meals  insulin lispro (ADMELOG) corrective regimen sliding scale   SubCutaneous at bedtime  levothyroxine 125 MICROGram(s) Oral daily  piperacillin/tazobactam IVPB.. 3.375 Gram(s) IV Intermittent every 8 hours  sodium bicarbonate 650 milliGRAM(s) Oral two times a day    MEDICATIONS  (PRN):  acetaminophen     Tablet .. 650 milliGRAM(s) Oral every 6 hours PRN Temp greater or equal to 38C (100.4F), Mild Pain (1 - 3)  aluminum hydroxide/magnesium hydroxide/simethicone Suspension 30 milliLiter(s) Oral every 4 hours PRN Dyspepsia  melatonin 3 milliGRAM(s) Oral at bedtime PRN Insomnia  ondansetron Injectable 4 milliGRAM(s) IV Push every 8 hours PRN Nausea and/or Vomiting      Allergies    No Known Allergies    Intolerances        ROS: All other systems reviewed and negative    PHYSICAL EXAM:  VITALS: T(C): 36.2 (06-23-23 @ 06:15)  T(F): 97.1 (06-23-23 @ 06:15), Max: 98.3 (06-22-23 @ 17:40)  HR: 62 (06-23-23 @ 06:15) (60 - 75)  BP: 149/61 (06-23-23 @ 06:15) (149/61 - 206/65)  RR:  (17 - 18)  SpO2:  (100% - 100%)  Wt(kg): --  GENERAL: NAD, resting comfortably   EYES: No proptosis,  anicteric  HEENT:  Atraumatic, Normocephalic, moist mucous membranes  RESPIRATORY: Nonlabored respirations on room air, normal rate/effort   CARDIOVASCULAR: Did not appear cyanotic, no lower extremity swelling visualized  GI: Soft, nontender, non distended  NEURO: Answering questions appropriately, moves all extremities spontaneously  PSYCH:  reactive affect, euthymic mood    POCT Blood Glucose.: 88 mg/dL (06-23-23 @ 08:34)  POCT Blood Glucose.: 188 mg/dL (06-22-23 @ 22:52)  POCT Blood Glucose.: 151 mg/dL (06-22-23 @ 16:43)  POCT Blood Glucose.: 203 mg/dL (06-22-23 @ 11:37)  POCT Blood Glucose.: 291 mg/dL (06-22-23 @ 00:50)      06-23    135  |  104  |  70<H>  ----------------------------<  83  5.2   |  16<L>  |  2.09<H>    eGFR: 27<L>    Ca    9.2      06-23  Mg     1.80     06-23  Phos  5.1     06-23    TPro  6.9  /  Alb  3.6  /  TBili  0.8  /  DBili  x   /  AST  30  /  ALT  50<H>  /  AlkPhos  341<H>  06-22      A1C with Estimated Average Glucose Result: 6.3 % (06-23-23 @ 07:53)  A1C with Estimated Average Glucose Result: 6.1 % (02-26-23 @ 05:32)  A1C with Estimated Average Glucose Result: 6.9 % (01-17-23 @ 07:10)      Thyroid Stimulating Hormone, Serum: 0.68 uIU/mL (06-22-23 @ 01:17)  Thyroid Stimulating Hormone, Serum: 0.71 uIU/mL (06-22-23 @ 00:50)   ENDOCRINE FOLLOW UP     Chief Complaint: hypothyroidism    History:   The patient denies nausea, vomiting, diarrhea, constipation. Discussed again patient having hypoglycemia to 60s while only on prandin 1mg tid but reports did not have symptoms of hypoglycemia. Patient reports she prefers to stay on prandin and does not want to change medications.    MEDICATIONS  (STANDING):  atorvastatin 40 milliGRAM(s) Oral at bedtime  buMETAnide 1 milliGRAM(s) Oral two times a day  chlorhexidine 2% Cloths 1 Application(s) Topical daily  dextrose 50% Injectable 25 Gram(s) IV Push once  dextrose 50% Injectable 12.5 Gram(s) IV Push once  dextrose 50% Injectable 25 Gram(s) IV Push once  dextrose Oral Gel 15 Gram(s) Oral once  glucagon  Injectable 1 milliGRAM(s) IntraMuscular once  hydrALAZINE 50 milliGRAM(s) Oral every 8 hours  insulin lispro (ADMELOG) corrective regimen sliding scale   SubCutaneous three times a day before meals  insulin lispro (ADMELOG) corrective regimen sliding scale   SubCutaneous at bedtime  levothyroxine 125 MICROGram(s) Oral daily  piperacillin/tazobactam IVPB.. 3.375 Gram(s) IV Intermittent every 8 hours  sodium bicarbonate 650 milliGRAM(s) Oral two times a day    MEDICATIONS  (PRN):  acetaminophen     Tablet .. 650 milliGRAM(s) Oral every 6 hours PRN Temp greater or equal to 38C (100.4F), Mild Pain (1 - 3)  aluminum hydroxide/magnesium hydroxide/simethicone Suspension 30 milliLiter(s) Oral every 4 hours PRN Dyspepsia  melatonin 3 milliGRAM(s) Oral at bedtime PRN Insomnia  ondansetron Injectable 4 milliGRAM(s) IV Push every 8 hours PRN Nausea and/or Vomiting      Allergies    No Known Allergies    Intolerances        ROS: All other systems reviewed and negative    PHYSICAL EXAM:  VITALS: T(C): 36.2 (06-23-23 @ 06:15)  T(F): 97.1 (06-23-23 @ 06:15), Max: 98.3 (06-22-23 @ 17:40)  HR: 62 (06-23-23 @ 06:15) (60 - 75)  BP: 149/61 (06-23-23 @ 06:15) (149/61 - 206/65)  RR:  (17 - 18)  SpO2:  (100% - 100%)  Wt(kg): --  GENERAL: NAD, resting comfortably in bed  EYES: No proptosis,  anicteric  HEENT:  Atraumatic, Normocephalic, dry mucous membranes  RESPIRATORY: Nonlabored respirations on room air, normal rate/effort   CARDIOVASCULAR: Did not appear cyanotic, no lower extremity swelling visualized  GI: did not appear distended  NEURO: Answering questions appropriately, moves all extremities spontaneously  PSYCH:  reactive affect, euthymic mood    POCT Blood Glucose.: 88 mg/dL (06-23-23 @ 08:34)  POCT Blood Glucose.: 188 mg/dL (06-22-23 @ 22:52)  POCT Blood Glucose.: 151 mg/dL (06-22-23 @ 16:43)  POCT Blood Glucose.: 203 mg/dL (06-22-23 @ 11:37)  POCT Blood Glucose.: 291 mg/dL (06-22-23 @ 00:50)      06-23    135  |  104  |  70<H>  ----------------------------<  83  5.2   |  16<L>  |  2.09<H>    eGFR: 27<L>    Ca    9.2      06-23  Mg     1.80     06-23  Phos  5.1     06-23    TPro  6.9  /  Alb  3.6  /  TBili  0.8  /  DBili  x   /  AST  30  /  ALT  50<H>  /  AlkPhos  341<H>  06-22      A1C with Estimated Average Glucose Result: 6.3 % (06-23-23 @ 07:53)  A1C with Estimated Average Glucose Result: 6.1 % (02-26-23 @ 05:32)  A1C with Estimated Average Glucose Result: 6.9 % (01-17-23 @ 07:10)      Thyroid Stimulating Hormone, Serum: 0.68 uIU/mL (06-22-23 @ 01:17)  Thyroid Stimulating Hormone, Serum: 0.71 uIU/mL (06-22-23 @ 00:50)

## 2023-06-23 NOTE — PROGRESS NOTE ADULT - ASSESSMENT
Ms. Hernandez is a 58 year old female with a PMHx of T2DM, hypothyroidism c/b myxedema coma, HLD, CKD, PAH, asthma who presents with hyperkalemia on outpatient labs to 6.4. Endocrinology consulted for management of T2DM and hypothyroidism.    #Hypothyroidism  #Hyperkalemia  #Hyponatremia  - patient presented with K outpatient 6.4, and hyponatremia Na <130, has had multiple am cortisols checked 1/17 21 and 2/25/23 25.8 ruling out adrenal insufficiency  - patient not bradycardic, is hypertensive but also hypothermic which may be related to severe anemia  - TSH 0.68, FT4 1.5 both within normal limits  - patient endorses compliance with levothyroxine 125 mcg daily (although cannot remember dose)  - low suspicion for AI given prior recent normal cortisols  - 6/23 Na 135, K 5.2  - 6/23 7:53am serum cortisol 12.1  PLAN  - continue levothyroxine 125 mcg daily (maintain on empty stomach (at least 1 hour before meals), separate by 4 hours from PPI or calcium supplementation which can inhibit its absorption)  - f/u ACTH  - can consider nephrology consult for hyperkalemia and hyponatremia and workup for additional etiologies besides AI    T2DM with hyperglycemia  - HbA1c: 6.3  - Home Regimen: prandin 1mg tid  - Endocrinologist: Dr. Mac  - does endorse hypoglycemia at home and poor po intake  PLAN  - Hold oral DM agents while inpatient  - ensure IV medications in non-dextrose containing formulation as able  - Use low dose Admelog correction scale pre-meal  - Use low dose Admelog correction scale at bedtime  - Fingerstick BG before meals and bedtime  - Goal -180  - Carbohydrate consistent diet  - hypoglycemia protocol prn  - RD consult  Discharge plan:  - Discharge medications: Given hypoglycemia, pending BG control would consider decrease prandin to 0.5mg tid vs stopping prandin and possibly adding a DPP-4 inhibitor  - Patient to call doctor with persistent high or low BG at home.   - Ensure patient has glucometer, test strips and lancets on discharge.  - Recommend routine outpatient ophthalmology, podiatry and endocrinology f/u    HTN  - Home regimen: bumex, hydralazine  PLAN  - Can check urine microalbumin outpatient  - Outpatient goal BP <130/80. Management per primary team.    HLD  - Home regimen: atorvastatin 40mg daily  PLAN  - Continue atorvastatin 40mg daily per primary team  - Can check lipid profile if not done recently    Patient has follow up with SOULEYMANE Recinos on 9/26 at 2pm for follow up.  [address] [place] [address]  Phone Number: [phone]    Discussed with primary team.    Chavo Crawford MD, Endocrinology Fellow  Pager [phone] from 9am to 5pm. After hours and on weekends, please call [phone].   Ms. Hernandez is a 58 year old female with a PMHx of T2DM, hypothyroidism c/b myxedema coma, HLD, CKD, PAH, asthma who presents with hyperkalemia on outpatient labs to 6.4. Endocrinology consulted for management of T2DM and hypothyroidism.    #Hypothyroidism  #Hyperkalemia  #Hyponatremia  - patient presented with K outpatient 6.4, and hyponatremia Na <130, has had multiple am cortisols checked 1/17 21 and 2/25/23 25.8 ruling out adrenal insufficiency  - patient not bradycardic, is hypertensive but also hypothermic which may be related to severe anemia  - TSH 0.68, FT4 1.5 both within normal limits  - patient endorses compliance with levothyroxine 125 mcg daily (although cannot remember dose)  - low suspicion for AI given prior recent normal cortisols  - 6/23 Na 135, K 5.2  - 6/23 7:53am serum cortisol 12.1  PLAN  - continue levothyroxine 125 mcg daily (maintain on empty stomach (at least 1 hour before meals), separate by 4 hours from PPI or calcium supplementation which can inhibit its absorption)  - f/u ACTH  - can consider nephrology consult for hyperkalemia and hyponatremia and workup for additional etiologies besides AI    T2DM with hyperglycemia  - HbA1c: 6.3, 6/23 fructosamine corresponds to HbA1c 7.8  - Home Regimen: prandin 1mg tid  - Endocrinologist: Dr. Mac  - does endorse hypoglycemia at home and poor po intake  PLAN  - Hold oral DM agents while inpatient  - ensure IV medications in non-dextrose containing formulation as able  - Use low dose Admelog correction scale pre-meal  - Use low dose Admelog correction scale at bedtime  - Fingerstick BG before meals and bedtime  - Goal -180  - Carbohydrate consistent diet  - hypoglycemia protocol prn  - RD consult  Discharge plan:  - Discharge medications: Given hypoglycemia, pending BG control would consider decrease prandin to 0.5mg tid vs stopping prandin and possibly adding a DPP-4 inhibitor  - Patient to call doctor with persistent high or low BG at home.   - Ensure patient has glucometer, test strips and lancets on discharge.  - Recommend routine outpatient ophthalmology, podiatry and endocrinology f/u    HTN  - Home regimen: bumex, hydralazine  PLAN  - Can check urine microalbumin outpatient  - Outpatient goal BP <130/80. Management per primary team.    HLD  - Home regimen: atorvastatin 40mg daily  PLAN  - Continue atorvastatin 40mg daily per primary team  - Can check lipid profile if not done recently    Patient has follow up with SOULEYMANE Recinos on 9/26 at 2pm for follow up.  [address] [place] [address]  Phone Number: [phone]    Discussed with primary team.    Chavo Crawford MD, Endocrinology Fellow  Pager [phone] from 9am to 5pm. After hours and on weekends, please call [phone].   Ms. Hernandez is a 58 year old female with a PMHx of T2DM, hypothyroidism c/b myxedema coma, HLD, CKD, PAH, asthma who presents with hyperkalemia on outpatient labs to 6.4. Endocrinology consulted for management of T2DM and hypothyroidism.    #Hypothyroidism  #Hyperkalemia  #Hyponatremia  - patient presented with K outpatient 6.4, and hyponatremia Na <130, has had multiple am cortisols checked 1/17 21 and 2/25/23 25.8 ruling out adrenal insufficiency  - patient not bradycardic, is hypertensive but also hypothermic which may be related to severe anemia  - TSH 0.68, FT4 1.5 both within normal limits  - patient endorses compliance with levothyroxine 125 mcg daily (although cannot remember dose)  - low suspicion for AI given prior recent normal cortisols  - 6/23 Na 135, K 5.2  - 6/23 7:53am serum cortisol 12.1  PLAN  - continue levothyroxine 125 mcg daily (maintain on empty stomach (at least 1 hour before meals), separate by 4 hours from PPI or calcium supplementation which can inhibit its absorption)  - f/u ACTH  - can consider nephrology consult for hyperkalemia and hyponatremia and workup for additional etiologies besides AI    T2DM with hyperglycemia  - HbA1c: 6.3, 6/23 fructosamine corresponds to HbA1c 7.8  - Home Regimen: prandin 1mg tid  - Endocrinologist: Dr. Mac  - does endorse hypoglycemia at home and poor po intake  PLAN  - Hold oral DM agents while inpatient  - ensure IV medications in non-dextrose containing formulation as able  - Use low dose Admelog correction scale pre-meal  - Use low dose Admelog correction scale at bedtime  - Fingerstick BG before meals and bedtime  - Goal -180  - Carbohydrate consistent diet  - hypoglycemia protocol prn  - RD consult  Discharge plan:  - Discharge medications: Given hypoglycemia, would decrease to prandin 0.5mg tid, patient is not interested in starting a DPP-4 inhibitor and would prefer to stay on prandin.  - Patient to call doctor with persistent high or low BG at home.   - Ensure patient has glucometer, test strips and lancets on discharge.  - Recommend routine outpatient ophthalmology, podiatry and endocrinology f/u    HTN  - Home regimen: bumex, hydralazine  PLAN  - Can check urine microalbumin outpatient  - Outpatient goal BP <130/80. Management per primary team.    HLD  - Home regimen: atorvastatin 40mg daily  PLAN  - Continue atorvastatin 40mg daily per primary team  - Can check lipid profile if not done recently    Patient has follow up with SOULEYMANE Recinos on 9/26 at 2pm for follow up.  [address] [place] [address]  Phone Number: [phone]    Discussed with primary team.    Chavo Crawford MD, Endocrinology Fellow  Pager [phone] from 9am to 5pm. After hours and on weekends, please call [phone].   Ms. Hernandez is a 58 year old female with a PMHx of T2DM, hypothyroidism c/b myxedema coma, HLD, CKD, PAH, asthma who presents with hyperkalemia on outpatient labs to 6.4. Endocrinology consulted for management of T2DM and hypothyroidism.    #Hypothyroidism  #Hyperkalemia  #Hyponatremia  - patient presented with K outpatient 6.4, and hyponatremia Na <130, has had multiple am cortisols checked 1/17 21 and 2/25/23 25.8 ruling out adrenal insufficiency  - patient not bradycardic, is hypertensive but also hypothermic which may be related to severe anemia  - TSH 0.68, FT4 1.5 both within normal limits  - patient endorses compliance with levothyroxine 125 mcg daily (although cannot remember dose)  - low suspicion for AI given prior recent normal cortisols  - 6/23 Na 135, K 5.2  - 6/23 7:53am serum cortisol 12.1, ACTH 45, low suspicion for AI  PLAN  - continue levothyroxine 125 mcg daily (maintain on empty stomach (at least 1 hour before meals), separate by 4 hours from PPI or calcium supplementation which can inhibit its absorption)  - f/u ACTH  - can consider nephrology consult for hyperkalemia and hyponatremia and workup for additional etiologies besides AI    T2DM with hyperglycemia  - HbA1c: 6.3, 6/23 outpatient fructosamine corresponds to HbA1c 7.8  - Home Regimen: prandin 1mg tid  - Endocrinologist: Dr. Mac  - does endorse hypoglycemia at home and poor po intake  PLAN  - Hold oral DM agents while inpatient  - ensure IV medications in non-dextrose containing formulation as able  - Use low dose Admelog correction scale pre-meal  - Use low dose Admelog correction scale at bedtime  - Fingerstick BG before meals and bedtime  - Goal -180  - Carbohydrate consistent diet  - hypoglycemia protocol prn  - RD consult  Discharge plan:  - Discharge medications: Given hypoglycemia at home, would decrease to prandin 0.5mg tid, patient is not interested in starting a DPP-4 inhibitor and would prefer to stay on prandin.  - Patient to call doctor with persistent high or low BG at home.   - Ensure patient has glucometer, test strips and lancets on discharge.  - Recommend routine outpatient ophthalmology, podiatry and endocrinology f/u    HTN  - Home regimen: bumex, hydralazine  PLAN  - Can check urine microalbumin outpatient  - Outpatient goal BP <130/80. Management per primary team.    HLD  - Home regimen: atorvastatin 40mg daily  PLAN  - Continue atorvastatin 40mg daily per primary team  - Can check lipid profile if not done recently    Patient has follow up with SOULEYMANE Recinos on 9/26 at 2pm for follow up.  [address] [place] [address]  Phone Number: [phone]    Discussed with primary team.    Chavo Crawford MD, Endocrinology Fellow  Pager [phone] from 9am to 5pm. After hours and on weekends, please call [phone].

## 2023-06-23 NOTE — PROGRESS NOTE ADULT - PROBLEM SELECTOR PLAN 1
-likely 2/2 CKD acidosis, or AI, or hypothyroidism, resolved after lokema, no ekg changes  -check cortisol level  -650mg Bicarb BID   -consider nephro consult  -endo consulted -likely 2/2 CKD acidosis, or AI, or hypothyroidism, resolved after lokema, no ekg changes  -check cortisol level-> wnl  -650mg Bicarb TID  -endo consulted  --decrease pradin on 0.5mg and add DPP4 upon dc

## 2023-06-23 NOTE — PROGRESS NOTE ADULT - ASSESSMENT
Impression: Type 2 diabetes mellitus with ophthalmic complications Plan: Reviewed the presence of diabetic retinopathy. control DM to reduce the risks of progression. Pt. with ISAMAR on CKD and hyponatremia

## 2023-06-23 NOTE — PROGRESS NOTE ADULT - PROBLEM SELECTOR PLAN 7
-A1C 7.1, complained of neuropathy baseline, bg read at home 100s, not on insulin at home  -ISS,  -endo consult -A1C 7.1, complained of neuropathy baseline, bg read at home 100s, not on insulin at home  -ISS,  -endo consulted-switched IVF to NS based  -decrease prandin

## 2023-06-23 NOTE — PROGRESS NOTE ADULT - PROBLEM SELECTOR PLAN 3
-2/2 possible infection or lower set point s/p myxedema coma  -RVP neg, f/u blood culture urine culture, UA neg  -f/u procal   -vanc and zosyn empirically   -yudy livingston as needed  -levothyroxine resumed -2/2 possible infection or lower set point s/p myxedema coma  -RVP neg, f/u blood culture urine culture, UA neg  -f/u procal   -dc vanc and zosyn  -yudy hug as needed  -levothyroxine resumed

## 2023-06-28 NOTE — PATIENT PROFILE ADULT - [id number]_NUR
No PAST MEDICAL HISTORY:  Nonintractable headache, unspecified chronicity pattern, unspecified headache type Chiari malformation- unspecified type

## 2023-06-29 LAB
CORTICOSTEROID BINDING GLOBULIN RESULT: 1.9 MG/DL — SIGNIFICANT CHANGE UP
CORTIS F/TOTAL MFR SERPL: 8.5 % — SIGNIFICANT CHANGE UP
CORTIS SERPL-MCNC: 10 UG/DL — SIGNIFICANT CHANGE UP
CORTISOL, FREE RESULT: 0.85 UG/DL — SIGNIFICANT CHANGE UP

## 2023-07-05 ENCOUNTER — NON-APPOINTMENT (OUTPATIENT)
Age: 58
End: 2023-07-05

## 2023-07-21 ENCOUNTER — APPOINTMENT (OUTPATIENT)
Dept: NEPHROLOGY | Facility: CLINIC | Age: 58
End: 2023-07-21
Payer: COMMERCIAL

## 2023-07-21 VITALS
BODY MASS INDEX: 23.21 KG/M2 | TEMPERATURE: 97.8 F | HEIGHT: 62 IN | OXYGEN SATURATION: 95 % | DIASTOLIC BLOOD PRESSURE: 67 MMHG | RESPIRATION RATE: 14 BRPM | WEIGHT: 126.13 LBS | SYSTOLIC BLOOD PRESSURE: 170 MMHG | HEART RATE: 76 BPM

## 2023-07-21 VITALS — DIASTOLIC BLOOD PRESSURE: 68 MMHG | SYSTOLIC BLOOD PRESSURE: 160 MMHG

## 2023-07-21 DIAGNOSIS — E87.5 HYPERKALEMIA: ICD-10-CM

## 2023-07-21 DIAGNOSIS — R60.0 LOCALIZED EDEMA: ICD-10-CM

## 2023-07-21 DIAGNOSIS — N18.4 CHRONIC KIDNEY DISEASE, STAGE 4 (SEVERE): ICD-10-CM

## 2023-07-21 PROCEDURE — 99214 OFFICE O/P EST MOD 30 MIN: CPT

## 2023-07-21 RX ORDER — OMEPRAZOLE 20 MG/1
20 TABLET, DELAYED RELEASE ORAL
Refills: 0 | Status: DISCONTINUED | COMMUNITY
Start: 2023-03-24 | End: 2023-07-21

## 2023-07-21 RX ORDER — REPAGLINIDE 1 MG/1
1 TABLET ORAL 3 TIMES DAILY
Refills: 0 | Status: ACTIVE | COMMUNITY
Start: 2022-11-29

## 2023-07-21 RX ORDER — HYDRALAZINE HYDROCHLORIDE 100 MG/1
100 TABLET ORAL
Qty: 180 | Refills: 3 | Status: ACTIVE | COMMUNITY
Start: 2022-11-29

## 2023-07-21 NOTE — REVIEW OF SYSTEMS
[As Noted in HPI] : as noted in HPI [Fever] : no fever [Feeling Poorly] : not feeling poorly [Feeling Tired] : not feeling tired [Eye Pain] : no eye pain [Earache] : no earache [Sore Throat] : no sore throat [Chest Pain] : no chest pain [Palpitations] : no palpitations [Lower Ext Edema] : no lower extremity edema [Shortness Of Breath] : no shortness of breath [Cough] : no cough [Abdominal Pain] : no abdominal pain [Constipation] : no constipation [Dysuria] : no dysuria [Limb Swelling] : no limb swelling [Itching] : no itching [Confused] : no confusion [Dizziness] : no dizziness [Anxiety] : no anxiety [de-identified] : DM, hypothyroidism

## 2023-07-21 NOTE — PHYSICAL EXAM
[General Appearance - Alert] : alert [General Appearance - Well Nourished] : well nourished [General Appearance - In No Acute Distress] : in no acute distress [General Appearance - Well Developed] : well developed [Sclera] : the sclera and conjunctiva were normal [Outer Ear] : the ears and nose were normal in appearance [Neck Appearance] : the appearance of the neck was normal [Jugular Venous Distention Increased] : there was no jugular-venous distention [Respiration, Rhythm And Depth] : normal respiratory rhythm and effort [Auscultation Breath Sounds / Voice Sounds] : lungs were clear to auscultation bilaterally [Heart Sounds] : normal S1 and S2 [Heart Sounds Gallop] : no gallops [Abdomen Soft] : soft [Abdomen Tenderness] : non-tender [No CVA Tenderness] : no ~M costovertebral angle tenderness [Nail Clubbing] : no clubbing  or cyanosis of the fingernails [] : no rash [Oriented To Time, Place, And Person] : oriented to person, place, and time [Edema] : there was no peripheral edema

## 2023-07-21 NOTE — ASSESSMENT
[FreeTextEntry1] : 1.CKD stage 4: Pt. with CKD in setting in setting of uncontrolled DM and HTN. Scr was elevated/stable at 2.09 on labs done during recent hospital stay at [facility] on 6/23/23. Spot urine TP/CR was elevated at 1.4 on 10/2/22. Kidney sonogram done on 9/28/22 showed bilateral increased echogenicity but no renal masses, hydronephrosis, or calculi. Check renal panel today. Importance of good glycemic and BP control reviewed with patient. Advised patient to avoid NSAIDs, RCAs, and other nephrotoxins. Monitor renal function. \par \par 2. HTN: BP elevated during office visit. Add oral Amlodipine 5 mg once daily. Low salt diet advised. Monitor BP. \par \par 3. LE edema: No LE edema on exam today. Continue oral diuretic therapy (oral Bumex 1 mg twice daily). Low salt diet and fluid restriction advised. Monitor daily weights. \par \par 4. Hyperkalemia: in setting of CKD and metabolic acidosis. Serum potassium improved (with medical management) to 5.2 on labs done during recent hospital stay at [facility] on 6/23/23. Pt. currently on oral sodium bicarbonate therapy. Low potassium diet advised. Check serum potassium today. \par \par \par Follow-up pending lab results.

## 2023-07-21 NOTE — HISTORY OF PRESENT ILLNESS
[FreeTextEntry1] : 58-year-old female with history of longstanding HTN, DM, anemia, CKD and hyperkalemia presents to clinic for follow-up visit. Pt. was last seen in clinic on 3/24/23.  \par \par Pt. was briefly hospitalized at [facility] (6/22/23-6/23/23) for hyperkalemia and severe anemia. Pt. received medical management for hyperkalemia. Pt. also received blood transfusion during hospital stay. Scr was elevated at 2.09 and serum potassium was WNL at 5.2 on 6/23/23. \par \par Pt. currently feels well. Pt. says she currently has no LE edema. No fever, CP, SOB, HA or dizziness.

## 2023-07-24 ENCOUNTER — NON-APPOINTMENT (OUTPATIENT)
Age: 58
End: 2023-07-24

## 2023-07-24 LAB
ALBUMIN SERPL ELPH-MCNC: 4 G/DL
ANION GAP SERPL CALC-SCNC: 14 MMOL/L
BUN SERPL-MCNC: 62 MG/DL
CALCIUM SERPL-MCNC: 9.3 MG/DL
CHLORIDE SERPL-SCNC: 100 MMOL/L
CO2 SERPL-SCNC: 18 MMOL/L
CREAT SERPL-MCNC: 2.05 MG/DL
EGFR: 28 ML/MIN/1.73M2
GLUCOSE SERPL-MCNC: 211 MG/DL
PHOSPHATE SERPL-MCNC: 4.8 MG/DL
POTASSIUM SERPL-SCNC: 4.8 MMOL/L
SODIUM SERPL-SCNC: 132 MMOL/L

## 2023-08-04 ENCOUNTER — INPATIENT (INPATIENT)
Facility: HOSPITAL | Age: 58
LOS: 2 days | Discharge: HOME | End: 2023-08-07
Attending: STUDENT IN AN ORGANIZED HEALTH CARE EDUCATION/TRAINING PROGRAM | Admitting: STUDENT IN AN ORGANIZED HEALTH CARE EDUCATION/TRAINING PROGRAM
Payer: COMMERCIAL

## 2023-08-04 VITALS
OXYGEN SATURATION: 98 % | SYSTOLIC BLOOD PRESSURE: 158 MMHG | TEMPERATURE: 98 F | HEIGHT: 62 IN | HEART RATE: 64 BPM | RESPIRATION RATE: 16 BRPM | DIASTOLIC BLOOD PRESSURE: 71 MMHG

## 2023-08-04 DIAGNOSIS — Z98.49 CATARACT EXTRACTION STATUS, UNSPECIFIED EYE: Chronic | ICD-10-CM

## 2023-08-04 LAB
ALBUMIN SERPL ELPH-MCNC: 3.8 G/DL — SIGNIFICANT CHANGE UP (ref 3.3–5)
ALP SERPL-CCNC: 410 U/L — HIGH (ref 40–120)
ALT FLD-CCNC: 81 U/L — HIGH (ref 4–33)
ANION GAP SERPL CALC-SCNC: 12 MMOL/L — SIGNIFICANT CHANGE UP (ref 7–14)
APTT BLD: 37.8 SEC — HIGH (ref 24.5–35.6)
AST SERPL-CCNC: 77 U/L — HIGH (ref 4–32)
BASOPHILS # BLD AUTO: 0.03 K/UL — SIGNIFICANT CHANGE UP (ref 0–0.2)
BASOPHILS NFR BLD AUTO: 0.4 % — SIGNIFICANT CHANGE UP (ref 0–2)
BILIRUB SERPL-MCNC: 0.5 MG/DL — SIGNIFICANT CHANGE UP (ref 0.2–1.2)
BLD GP AB SCN SERPL QL: NEGATIVE — SIGNIFICANT CHANGE UP
BUN SERPL-MCNC: 61 MG/DL — HIGH (ref 7–23)
CALCIUM SERPL-MCNC: 9.2 MG/DL — SIGNIFICANT CHANGE UP (ref 8.4–10.5)
CHLORIDE SERPL-SCNC: 103 MMOL/L — SIGNIFICANT CHANGE UP (ref 98–107)
CK SERPL-CCNC: 113 U/L — SIGNIFICANT CHANGE UP (ref 25–170)
CO2 SERPL-SCNC: 20 MMOL/L — LOW (ref 22–31)
CREAT SERPL-MCNC: 2.06 MG/DL — HIGH (ref 0.5–1.3)
EGFR: 28 ML/MIN/1.73M2 — LOW
EOSINOPHIL # BLD AUTO: 0.19 K/UL — SIGNIFICANT CHANGE UP (ref 0–0.5)
EOSINOPHIL NFR BLD AUTO: 2.8 % — SIGNIFICANT CHANGE UP (ref 0–6)
GLUCOSE SERPL-MCNC: 157 MG/DL — HIGH (ref 70–99)
HCT VFR BLD CALC: 22.3 % — LOW (ref 34.5–45)
HGB BLD-MCNC: 7.2 G/DL — LOW (ref 11.5–15.5)
IANC: 5.37 K/UL — SIGNIFICANT CHANGE UP (ref 1.8–7.4)
IMM GRANULOCYTES NFR BLD AUTO: 0.4 % — SIGNIFICANT CHANGE UP (ref 0–0.9)
INR BLD: 1.11 RATIO — SIGNIFICANT CHANGE UP (ref 0.85–1.18)
LYMPHOCYTES # BLD AUTO: 0.63 K/UL — LOW (ref 1–3.3)
LYMPHOCYTES # BLD AUTO: 9.4 % — LOW (ref 13–44)
MCHC RBC-ENTMCNC: 26.1 PG — LOW (ref 27–34)
MCHC RBC-ENTMCNC: 32.3 GM/DL — SIGNIFICANT CHANGE UP (ref 32–36)
MCV RBC AUTO: 80.8 FL — SIGNIFICANT CHANGE UP (ref 80–100)
MONOCYTES # BLD AUTO: 0.42 K/UL — SIGNIFICANT CHANGE UP (ref 0–0.9)
MONOCYTES NFR BLD AUTO: 6.3 % — SIGNIFICANT CHANGE UP (ref 2–14)
NEUTROPHILS # BLD AUTO: 5.37 K/UL — SIGNIFICANT CHANGE UP (ref 1.8–7.4)
NEUTROPHILS NFR BLD AUTO: 80.7 % — HIGH (ref 43–77)
NRBC # BLD: 0 /100 WBCS — SIGNIFICANT CHANGE UP (ref 0–0)
NRBC # FLD: 0 K/UL — SIGNIFICANT CHANGE UP (ref 0–0)
NT-PROBNP SERPL-SCNC: 4808 PG/ML — HIGH
PLATELET # BLD AUTO: 145 K/UL — LOW (ref 150–400)
POTASSIUM SERPL-MCNC: 5.8 MMOL/L — HIGH (ref 3.5–5.3)
POTASSIUM SERPL-SCNC: 5.8 MMOL/L — HIGH (ref 3.5–5.3)
PROT SERPL-MCNC: 7.7 G/DL — SIGNIFICANT CHANGE UP (ref 6–8.3)
PROTHROM AB SERPL-ACNC: 12.4 SEC — SIGNIFICANT CHANGE UP (ref 9.5–13)
RBC # BLD: 2.76 M/UL — LOW (ref 3.8–5.2)
RBC # FLD: 14.4 % — SIGNIFICANT CHANGE UP (ref 10.3–14.5)
RH IG SCN BLD-IMP: POSITIVE — SIGNIFICANT CHANGE UP
SODIUM SERPL-SCNC: 135 MMOL/L — SIGNIFICANT CHANGE UP (ref 135–145)
TROPONIN T, HIGH SENSITIVITY RESULT: 52 NG/L — HIGH
WBC # BLD: 6.67 K/UL — SIGNIFICANT CHANGE UP (ref 3.8–10.5)
WBC # FLD AUTO: 6.67 K/UL — SIGNIFICANT CHANGE UP (ref 3.8–10.5)

## 2023-08-04 PROCEDURE — 99285 EMERGENCY DEPT VISIT HI MDM: CPT

## 2023-08-04 PROCEDURE — 71046 X-RAY EXAM CHEST 2 VIEWS: CPT | Mod: 26

## 2023-08-04 NOTE — ED ADULT NURSE NOTE - OBJECTIVE STATEMENT
received pt in spot 5A, 58 yr/o female A+OX4, ambulatory at baseline. pt was sent to ED by PCP for low hgb. pt endorses generalized weakness and fatigue for 1 week. skin is pale. VSS, denies chest pain and SOB, RR even and unlabored. left hand 20g placed, labs drawn and sent meds given as ordered. pt is stable at time. pending blood results.

## 2023-08-04 NOTE — ED PROVIDER NOTE - PROGRESS NOTE DETAILS
Hallie Ron DO PGY-3  Received sign out on patient, in short is here with symptomatic anemia and dyspnea on exertion. pRBC running. Currently has no acute complaints, hemodynamically stable. Annemarie, PGY3: Patient pending trop and bnp. Will keep in cdu or tba for symptomatic anemia.

## 2023-08-04 NOTE — ED PROVIDER NOTE - PHYSICAL EXAMINATION
Dr Chu  Vital signs noted heart rate 64 rest 16 pulse ox 99% on room air.  Patient ambulatory able to change without assistance.  ANO x3.  No retractions no work of breathing talking in full sentences.    Soft nontender abdomen.    No significant pedal edema no calf tenderness bilaterally.  Not icteric not jaundiced.

## 2023-08-04 NOTE — ED ADULT TRIAGE NOTE - CHIEF COMPLAINT QUOTE
PHx kidney disease, DM2, hypothyroid, heart failure, anemia w blood transfusions. Last transfusion last month. Pt had blood work done one week ago. Results given to pt at PCP appointment on Wednesday 8/2/23. Was told Hgb 7.0 and to come for blood transfusion. Pt c/o chills and generalized weakness.  Denies SOB, dizziness. Fingerstick 202

## 2023-08-04 NOTE — PROGRESS NOTE ADULT - PROBLEM SELECTOR PROBLEM 4
Vinayak Soni  [phone]    Asking for date of last office visit. Provided. History of myxedema coma

## 2023-08-04 NOTE — ED PROVIDER NOTE - OBJECTIVE STATEMENT
Dr Chu  58-year-old female history of hypertension, CHF, CKD not on hemodialysis, diabetes, hypothyroidism, asthma from home chief complaint of low blood counts.  Patient states she went to her primary doctor last week went today to get her results and was told that her hemoglobin was low hemoglobin of 7 to come to the hospital for admission and transfusion.  Patient endorsing shortness of breath and dyspnea on exertion.  States he Like  Heart hemoglobin is low.  Denies any chest pain denies any fever or chills.  Daughter with her states she has CKD but not on hemodialysis.  Makes urine.  Has a history of heart failure on Bumex.  Patient complaining of feeling cold but has no other complaints at this time.  Denies any rectal bleed.  States her stool is black the patient has been taking iron.  Has no urinary complaints and has no hematuria.  Denies taking anticoagulation.

## 2023-08-04 NOTE — ED ADULT NURSE NOTE - NSFALLRISKINTERV_ED_ALL_ED
Assistance OOB with selected safe patient handling equipment if applicable/Assistance with ambulation/Communicate fall risk and risk factors to all staff, patient, and family/Provide patient with walking aids/Provide visual cue: yellow wristband, yellow gown, etc/Reinforce activity limits and safety measures with patient and family/Call bell, personal items and telephone in reach/Instruct patient to call for assistance before getting out of bed/chair/stretcher/Non-slip footwear applied when patient is off stretcher/[place] to call system/Physically safe environment - no spills, clutter or unnecessary equipment/Purposeful Proactive Rounding/Room/bathroom lighting operational, light cord in reach

## 2023-08-05 DIAGNOSIS — R06.09 OTHER FORMS OF DYSPNEA: ICD-10-CM

## 2023-08-05 DIAGNOSIS — D64.9 ANEMIA, UNSPECIFIED: ICD-10-CM

## 2023-08-05 DIAGNOSIS — E11.9 TYPE 2 DIABETES MELLITUS WITHOUT COMPLICATIONS: ICD-10-CM

## 2023-08-05 DIAGNOSIS — I50.23 ACUTE ON CHRONIC SYSTOLIC (CONGESTIVE) HEART FAILURE: ICD-10-CM

## 2023-08-05 DIAGNOSIS — Z29.9 ENCOUNTER FOR PROPHYLACTIC MEASURES, UNSPECIFIED: ICD-10-CM

## 2023-08-05 DIAGNOSIS — N18.4 CHRONIC KIDNEY DISEASE, STAGE 4 (SEVERE): ICD-10-CM

## 2023-08-05 LAB
ALBUMIN SERPL ELPH-MCNC: 3.9 G/DL — SIGNIFICANT CHANGE UP (ref 3.3–5)
ALP SERPL-CCNC: 391 U/L — HIGH (ref 40–120)
ALT FLD-CCNC: 75 U/L — HIGH (ref 4–33)
ANION GAP SERPL CALC-SCNC: 11 MMOL/L — SIGNIFICANT CHANGE UP (ref 7–14)
AST SERPL-CCNC: 53 U/L — HIGH (ref 4–32)
BASOPHILS # BLD AUTO: 0.03 K/UL — SIGNIFICANT CHANGE UP (ref 0–0.2)
BASOPHILS NFR BLD AUTO: 0.4 % — SIGNIFICANT CHANGE UP (ref 0–2)
BILIRUB SERPL-MCNC: 0.8 MG/DL — SIGNIFICANT CHANGE UP (ref 0.2–1.2)
BUN SERPL-MCNC: 56 MG/DL — HIGH (ref 7–23)
CALCIUM SERPL-MCNC: 8.9 MG/DL — SIGNIFICANT CHANGE UP (ref 8.4–10.5)
CHLORIDE SERPL-SCNC: 105 MMOL/L — SIGNIFICANT CHANGE UP (ref 98–107)
CO2 SERPL-SCNC: 19 MMOL/L — LOW (ref 22–31)
CREAT SERPL-MCNC: 1.78 MG/DL — HIGH (ref 0.5–1.3)
EGFR: 33 ML/MIN/1.73M2 — LOW
EOSINOPHIL # BLD AUTO: 0.19 K/UL — SIGNIFICANT CHANGE UP (ref 0–0.5)
EOSINOPHIL NFR BLD AUTO: 2.5 % — SIGNIFICANT CHANGE UP (ref 0–6)
GLUCOSE BLDC GLUCOMTR-MCNC: 169 MG/DL — HIGH (ref 70–99)
GLUCOSE BLDC GLUCOMTR-MCNC: 180 MG/DL — HIGH (ref 70–99)
GLUCOSE BLDC GLUCOMTR-MCNC: 188 MG/DL — HIGH (ref 70–99)
GLUCOSE BLDC GLUCOMTR-MCNC: 215 MG/DL — HIGH (ref 70–99)
GLUCOSE BLDC GLUCOMTR-MCNC: 221 MG/DL — HIGH (ref 70–99)
GLUCOSE BLDC GLUCOMTR-MCNC: 281 MG/DL — HIGH (ref 70–99)
GLUCOSE SERPL-MCNC: 143 MG/DL — HIGH (ref 70–99)
HCT VFR BLD CALC: 24.1 % — LOW (ref 34.5–45)
HCT VFR BLD CALC: 26.3 % — LOW (ref 34.5–45)
HGB BLD-MCNC: 8 G/DL — LOW (ref 11.5–15.5)
HGB BLD-MCNC: 8.4 G/DL — LOW (ref 11.5–15.5)
IANC: 6.4 K/UL — SIGNIFICANT CHANGE UP (ref 1.8–7.4)
IMM GRANULOCYTES NFR BLD AUTO: 0.7 % — SIGNIFICANT CHANGE UP (ref 0–0.9)
LYMPHOCYTES # BLD AUTO: 0.53 K/UL — LOW (ref 1–3.3)
LYMPHOCYTES # BLD AUTO: 6.9 % — LOW (ref 13–44)
MAGNESIUM SERPL-MCNC: 1.8 MG/DL — SIGNIFICANT CHANGE UP (ref 1.6–2.6)
MCHC RBC-ENTMCNC: 25.5 PG — LOW (ref 27–34)
MCHC RBC-ENTMCNC: 26.4 PG — LOW (ref 27–34)
MCHC RBC-ENTMCNC: 31.9 GM/DL — LOW (ref 32–36)
MCHC RBC-ENTMCNC: 33.2 GM/DL — SIGNIFICANT CHANGE UP (ref 32–36)
MCV RBC AUTO: 79.5 FL — LOW (ref 80–100)
MCV RBC AUTO: 79.7 FL — LOW (ref 80–100)
MONOCYTES # BLD AUTO: 0.48 K/UL — SIGNIFICANT CHANGE UP (ref 0–0.9)
MONOCYTES NFR BLD AUTO: 6.3 % — SIGNIFICANT CHANGE UP (ref 2–14)
NEUTROPHILS # BLD AUTO: 6.4 K/UL — SIGNIFICANT CHANGE UP (ref 1.8–7.4)
NEUTROPHILS NFR BLD AUTO: 83.2 % — HIGH (ref 43–77)
NRBC # BLD: 0 /100 WBCS — SIGNIFICANT CHANGE UP (ref 0–0)
NRBC # BLD: 0 /100 WBCS — SIGNIFICANT CHANGE UP (ref 0–0)
NRBC # FLD: 0 K/UL — SIGNIFICANT CHANGE UP (ref 0–0)
NRBC # FLD: 0 K/UL — SIGNIFICANT CHANGE UP (ref 0–0)
PHOSPHATE SERPL-MCNC: 4.4 MG/DL — SIGNIFICANT CHANGE UP (ref 2.5–4.5)
PLATELET # BLD AUTO: 143 K/UL — LOW (ref 150–400)
PLATELET # BLD AUTO: 151 K/UL — SIGNIFICANT CHANGE UP (ref 150–400)
POTASSIUM SERPL-MCNC: 5.2 MMOL/L — SIGNIFICANT CHANGE UP (ref 3.5–5.3)
POTASSIUM SERPL-SCNC: 5.2 MMOL/L — SIGNIFICANT CHANGE UP (ref 3.5–5.3)
PROT SERPL-MCNC: 7 G/DL — SIGNIFICANT CHANGE UP (ref 6–8.3)
RBC # BLD: 3.03 M/UL — LOW (ref 3.8–5.2)
RBC # BLD: 3.3 M/UL — LOW (ref 3.8–5.2)
RBC # FLD: 13.8 % — SIGNIFICANT CHANGE UP (ref 10.3–14.5)
RBC # FLD: 14.1 % — SIGNIFICANT CHANGE UP (ref 10.3–14.5)
RETICS #: 42 K/UL — SIGNIFICANT CHANGE UP (ref 25–125)
RETICS/RBC NFR: 1.4 % — SIGNIFICANT CHANGE UP (ref 0.5–2.5)
SODIUM SERPL-SCNC: 135 MMOL/L — SIGNIFICANT CHANGE UP (ref 135–145)
TROPONIN T, HIGH SENSITIVITY RESULT: 54 NG/L — CRITICAL HIGH
WBC # BLD: 6.86 K/UL — SIGNIFICANT CHANGE UP (ref 3.8–10.5)
WBC # BLD: 7.68 K/UL — SIGNIFICANT CHANGE UP (ref 3.8–10.5)
WBC # FLD AUTO: 6.86 K/UL — SIGNIFICANT CHANGE UP (ref 3.8–10.5)
WBC # FLD AUTO: 7.68 K/UL — SIGNIFICANT CHANGE UP (ref 3.8–10.5)

## 2023-08-05 PROCEDURE — 99223 1ST HOSP IP/OBS HIGH 75: CPT

## 2023-08-05 RX ORDER — SODIUM BICARBONATE 1 MEQ/ML
650 SYRINGE (ML) INTRAVENOUS THREE TIMES A DAY
Refills: 0 | Status: DISCONTINUED | OUTPATIENT
Start: 2023-08-05 | End: 2023-08-05

## 2023-08-05 RX ORDER — BUMETANIDE 0.25 MG/ML
1 INJECTION INTRAMUSCULAR; INTRAVENOUS EVERY 12 HOURS
Refills: 0 | Status: DISCONTINUED | OUTPATIENT
Start: 2023-08-05 | End: 2023-08-05

## 2023-08-05 RX ORDER — INSULIN LISPRO 100/ML
VIAL (ML) SUBCUTANEOUS
Refills: 0 | Status: DISCONTINUED | OUTPATIENT
Start: 2023-08-05 | End: 2023-08-07

## 2023-08-05 RX ORDER — DEXTROSE 50 % IN WATER 50 %
12.5 SYRINGE (ML) INTRAVENOUS ONCE
Refills: 0 | Status: DISCONTINUED | OUTPATIENT
Start: 2023-08-05 | End: 2023-08-07

## 2023-08-05 RX ORDER — SODIUM CHLORIDE 9 MG/ML
1000 INJECTION, SOLUTION INTRAVENOUS
Refills: 0 | Status: DISCONTINUED | OUTPATIENT
Start: 2023-08-05 | End: 2023-08-07

## 2023-08-05 RX ORDER — AMLODIPINE BESYLATE 2.5 MG/1
5 TABLET ORAL DAILY
Refills: 0 | Status: DISCONTINUED | OUTPATIENT
Start: 2023-08-05 | End: 2023-08-07

## 2023-08-05 RX ORDER — SODIUM BICARBONATE 1 MEQ/ML
1350 SYRINGE (ML) INTRAVENOUS THREE TIMES A DAY
Refills: 0 | Status: DISCONTINUED | OUTPATIENT
Start: 2023-08-05 | End: 2023-08-05

## 2023-08-05 RX ORDER — INSULIN HUMAN 100 [IU]/ML
5 INJECTION, SOLUTION SUBCUTANEOUS ONCE
Refills: 0 | Status: COMPLETED | OUTPATIENT
Start: 2023-08-05 | End: 2023-08-05

## 2023-08-05 RX ORDER — GLUCAGON INJECTION, SOLUTION 0.5 MG/.1ML
1 INJECTION, SOLUTION SUBCUTANEOUS ONCE
Refills: 0 | Status: DISCONTINUED | OUTPATIENT
Start: 2023-08-05 | End: 2023-08-07

## 2023-08-05 RX ORDER — DEXTROSE 50 % IN WATER 50 %
25 SYRINGE (ML) INTRAVENOUS ONCE
Refills: 0 | Status: DISCONTINUED | OUTPATIENT
Start: 2023-08-05 | End: 2023-08-07

## 2023-08-05 RX ORDER — DEXTROSE 50 % IN WATER 50 %
15 SYRINGE (ML) INTRAVENOUS ONCE
Refills: 0 | Status: DISCONTINUED | OUTPATIENT
Start: 2023-08-05 | End: 2023-08-07

## 2023-08-05 RX ORDER — BUMETANIDE 0.25 MG/ML
1 INJECTION INTRAMUSCULAR; INTRAVENOUS
Refills: 0 | Status: COMPLETED | OUTPATIENT
Start: 2023-08-05 | End: 2023-08-06

## 2023-08-05 RX ORDER — BUMETANIDE 0.25 MG/ML
1 INJECTION INTRAMUSCULAR; INTRAVENOUS ONCE
Refills: 0 | Status: COMPLETED | OUTPATIENT
Start: 2023-08-05 | End: 2023-08-05

## 2023-08-05 RX ORDER — ATORVASTATIN CALCIUM 80 MG/1
40 TABLET, FILM COATED ORAL AT BEDTIME
Refills: 0 | Status: DISCONTINUED | OUTPATIENT
Start: 2023-08-05 | End: 2023-08-07

## 2023-08-05 RX ORDER — SODIUM BICARBONATE 1 MEQ/ML
1300 SYRINGE (ML) INTRAVENOUS THREE TIMES A DAY
Refills: 0 | Status: DISCONTINUED | OUTPATIENT
Start: 2023-08-05 | End: 2023-08-07

## 2023-08-05 RX ORDER — LEVOTHYROXINE SODIUM 125 MCG
125 TABLET ORAL DAILY
Refills: 0 | Status: DISCONTINUED | OUTPATIENT
Start: 2023-08-05 | End: 2023-08-07

## 2023-08-05 RX ORDER — DEXTROSE 50 % IN WATER 50 %
25 SYRINGE (ML) INTRAVENOUS ONCE
Refills: 0 | Status: COMPLETED | OUTPATIENT
Start: 2023-08-05 | End: 2023-08-05

## 2023-08-05 RX ORDER — PANTOPRAZOLE SODIUM 20 MG/1
40 TABLET, DELAYED RELEASE ORAL
Refills: 0 | Status: DISCONTINUED | OUTPATIENT
Start: 2023-08-05 | End: 2023-08-07

## 2023-08-05 RX ORDER — BUMETANIDE 0.25 MG/ML
2 INJECTION INTRAMUSCULAR; INTRAVENOUS ONCE
Refills: 0 | Status: DISCONTINUED | OUTPATIENT
Start: 2023-08-05 | End: 2023-08-05

## 2023-08-05 RX ORDER — HYDRALAZINE HCL 50 MG
100 TABLET ORAL EVERY 8 HOURS
Refills: 0 | Status: DISCONTINUED | OUTPATIENT
Start: 2023-08-05 | End: 2023-08-07

## 2023-08-05 RX ORDER — INSULIN LISPRO 100/ML
VIAL (ML) SUBCUTANEOUS AT BEDTIME
Refills: 0 | Status: DISCONTINUED | OUTPATIENT
Start: 2023-08-05 | End: 2023-08-07

## 2023-08-05 RX ADMIN — Medication 1300 MILLIGRAM(S): at 14:53

## 2023-08-05 RX ADMIN — BUMETANIDE 1 MILLIGRAM(S): 0.25 INJECTION INTRAMUSCULAR; INTRAVENOUS at 11:59

## 2023-08-05 RX ADMIN — Medication 25 MILLILITER(S): at 09:56

## 2023-08-05 RX ADMIN — Medication 100 MILLIGRAM(S): at 22:20

## 2023-08-05 RX ADMIN — BUMETANIDE 1 MILLIGRAM(S): 0.25 INJECTION INTRAMUSCULAR; INTRAVENOUS at 22:16

## 2023-08-05 RX ADMIN — PANTOPRAZOLE SODIUM 40 MILLIGRAM(S): 20 TABLET, DELAYED RELEASE ORAL at 11:59

## 2023-08-05 RX ADMIN — INSULIN HUMAN 5 UNIT(S): 100 INJECTION, SOLUTION SUBCUTANEOUS at 09:56

## 2023-08-05 RX ADMIN — ATORVASTATIN CALCIUM 40 MILLIGRAM(S): 80 TABLET, FILM COATED ORAL at 22:20

## 2023-08-05 RX ADMIN — AMLODIPINE BESYLATE 5 MILLIGRAM(S): 2.5 TABLET ORAL at 11:59

## 2023-08-05 RX ADMIN — Medication 100 MILLIGRAM(S): at 14:23

## 2023-08-05 RX ADMIN — Medication 1: at 18:25

## 2023-08-05 RX ADMIN — Medication 1: at 13:07

## 2023-08-05 NOTE — H&P ADULT - PROBLEM SELECTOR PLAN 3
-pt with CKD 4, baseline cr or approximately 2, currently at baseline  -K 5.8 with mild hemolysis, s/p temporizing measures. f/u repeat BMP  -monitor i's and o's  -increase bicarb to 1350 mg TID -pt with CKD 4, baseline cr or approximately 2, currently at baseline  -K 5.8 with mild hemolysis, s/p temporizing measures. f/u repeat BMP  -monitor i's and o's  -increase bicarb to 1300 mg TID

## 2023-08-05 NOTE — H&P ADULT - PROBLEM SELECTOR PLAN 2
-pt with moderate diastolic dysfunction on last TTE in February of 2023  -given worsening MARKS and LE edema, will repeat TTE  -on Bumex 1 mg BID at home  -will dose IV Bumex 1 mg IV x 2 doses today; re-evaluate volume status tomorrow to determine whether to continue IV or switch back to PO  -strict i's and o's, daily weights

## 2023-08-05 NOTE — ED ADULT NURSE REASSESSMENT NOTE - NS ED NURSE REASSESS COMMENT FT1
Pt waiting for transportation to floor. Pt denies any pain for discomfort, Pt lying comfortably in bed. Pt safety maintained.
No signs of transfusion reaction noted, denies back pain, chills, sob, cp, and hives. Respirations even and unlabored. In NAD.
Received report from Day RN, pt lying in bed comfortably. pt denies any pain or discomfort. Pt waiting for lab results. Pt safety maintained.
Pt educated on need for blood transfusion and to notify RN for adverse side effects include SOB, CP, itching, hives, back pain. and chill. Consent for blood products in chart.

## 2023-08-05 NOTE — H&P ADULT - HISTORY OF PRESENT ILLNESS
58F with history of T2DM on oral agents, HTN, HLD, CKD (Most recently CKD 3-4), Hypothyroidism ( hx of admission for myxedema coma), Pulmonary Arterial Hypertension with mod-severe TR, and Asthma who presents due to low hemoglobin on outpatient labs. Patient states she went to her primary doctor last week went today to get her results and was told that her hemoglobin was low hemoglobin of 7 to come to the hospital for admission and transfusion. Patient endorses SOB and worsening MARKS. Also endorsing orthopnea. Also reports pain in her LEs with worsening swelling. No rectal bleeding; has dark stools at baseline as she states she takes iron. Pt states last colonoscopy took place about a year and a half ago; she was supposed to have a repeat procedure but has not followed up.     In the ED, pt was found to have hgb ot 7.4. She was transfused 1 u PRBCs.  58F with history of T2DM on oral agents, HTN, HLD, CKD (Most recently CKD 3-4), Hypothyroidism ( hx of admission for myxedema coma), Pulmonary Arterial Hypertension with mod-severe TR, and Asthma who presents due to low hemoglobin on outpatient labs. Patient states she went to her primary doctor last week went today to get her results and was told that her hemoglobin was low hemoglobin of 7 to come to the hospital for admission and transfusion. Patient endorses SOB and worsening MARKS. Also endorsing orthopnea. Also reports pain in her LEs with worsening swelling. No rectal bleeding; has dark stools at baseline as she states she takes iron. Pt states last colonoscopy took place about a year and a half ago; she was supposed to have a repeat procedure but has not followed up.     In the ED, pt was found to have hgb ot 7.2. She was transfused 1 u PRBCs.

## 2023-08-05 NOTE — H&P ADULT - PROBLEM/PLAN-2
Cant stand / walk, blood inside leg and really cant sit more than 15 mins.    Patient has had two major dvt and prone to blood clots and daughter is prone to blood clots   DISPLAY PLAN FREE TEXT

## 2023-08-05 NOTE — H&P ADULT - TIME BILLING
Review of laboratory data, radiology results, consultants' recommendations, documentation in [place], discussion with patient/house staff and interdisciplinary staff (such as , social workers, etc). Interventions were performed as documented above.

## 2023-08-05 NOTE — H&P ADULT - ASSESSMENT
58F with history of T2DM on oral agents, HTN, HLD, CKD (Most recently CKD 3-4), Hypothyroidism ( hx of admission for myxedema coma), Pulmonary Arterial Hypertension with mod-severe TR, and Asthma who presents due to low hemoglobin of 7 on outpatient labs. In the ED, pt was found to have hgb of 7.2; transfused 1 u PRBCs due to concern for symptomatic anemia as pt was endorsing worsening SOB. Also found to have signs of volume overload on exam.

## 2023-08-05 NOTE — H&P ADULT - PROBLEM SELECTOR PLAN 1
-pt with normocytic anemia, prior iron studies demonstrate elevated ferritin, consistent with anemia of chronic disease with suspected component of anemia of CKD  -s/p 1 u PRBCs given symptomatic anemia; hgb appropriately improved to 8.4  -no serological evidence of hemolysis noted on labs  -no evidence of active bleeding; pt has chronically dark stools due to iron intake  -pt had last colonoscopy 1.5 years ago, was supposed to repeat but never followed up. Given pt has had 3 admissions over the past few months for the same issue, emailed GI to request possible inpatient colonoscopy

## 2023-08-05 NOTE — H&P ADULT - NSHPPHYSICALEXAM_GEN_ALL_CORE
Vital Signs Last 24 Hrs  T(C): 36.6 (05 Aug 2023 10:43), Max: 36.9 (04 Aug 2023 21:12)  T(F): 97.9 (05 Aug 2023 10:43), Max: 98.4 (04 Aug 2023 21:12)  HR: 71 (05 Aug 2023 10:43) (64 - 76)  BP: 161/61 (05 Aug 2023 10:43) (148/64 - 164/71)  BP(mean): --  RR: 18 (05 Aug 2023 10:43) (16 - 19)  SpO2: 100% (05 Aug 2023 10:43) (98% - 100%)    Parameters below as of 05 Aug 2023 10:43  Patient On (Oxygen Delivery Method): nasal cannula  O2 Flow (L/min): 1      General: WN/WD NAD  Neurology: A&Ox3, nonfocal, MARTINEZ x 4  Eyes: PERRLA/ EOMI, Gross vision intact  ENT/Neck: Neck supple, trachea midline, No JVD, Gross hearing intact  Respiratory: Mild tachypnea. CTA B/L, No wheezing, rales, rhonchi  CV: RRR, S1S2, no murmurs, rubs or gallops. 1-2+ LE edema with pitting b/l  Abdominal: Soft, NT, ND +BS,   Extremities:  + peripheral pulses  Skin: No Rashes, Hematoma, Ecchymosis

## 2023-08-05 NOTE — PATIENT PROFILE ADULT - FALL HARM RISK - HARM RISK INTERVENTIONS

## 2023-08-05 NOTE — H&P ADULT - NSHPLABSRESULTS_GEN_ALL_CORE
.                        8.4    6.86  )-----------( 151      ( 05 Aug 2023 02:25 )             26.3     Hgb Trend: 8.4<--, 7.2<--  08-04    135  |  103  |  61<H>  ----------------------------<  157<H>  5.8<H>   |  20<L>  |  2.06<H>    Ca    9.2      04 Aug 2023 19:40    TPro  7.7  /  Alb  3.8  /  TBili  0.5  /  DBili  x   /  AST  77<H>  /  ALT  81<H>  /  AlkPhos  410<H>  08-04    Creatinine Trend: 2.06<--  PT/INR - ( 04 Aug 2023 19:23 )   PT: 12.4 sec;   INR: 1.11 ratio         PTT - ( 04 Aug 2023 19:23 )  PTT:37.8 sec  Urinalysis Basic - ( 04 Aug 2023 19:40 )    Color: x / Appearance: x / SG: x / pH: x  Gluc: 157 mg/dL / Ketone: x  / Bili: x / Urobili: x   Blood: x / Protein: x / Nitrite: x   Leuk Esterase: x / RBC: x / WBC x   Sq Epi: x / Non Sq Epi: x / Bacteria: x      INTERPRETATION:  CLINICAL INFORMATION: Low hemoglobin and shortness of   breath.          CXR 8/4  FINDINGS:    Redemonstration of diffuse bilateral hazy/reticular opacities, as   compared to chest radiograph from 6/22/2023.  No large pleural effusion or discernible pneumothorax.  Heart size is within normal limits.  No acute abnormality within visible osseous structures.      IMPRESSION:  Small left pleural effusion.  Redemonstration of diffuse bilateral hazy/reticular opacities, as   compared to chest radiograph from 6/22/2023

## 2023-08-06 DIAGNOSIS — I50.33 ACUTE ON CHRONIC DIASTOLIC (CONGESTIVE) HEART FAILURE: ICD-10-CM

## 2023-08-06 DIAGNOSIS — E03.9 HYPOTHYROIDISM, UNSPECIFIED: ICD-10-CM

## 2023-08-06 LAB
ANION GAP SERPL CALC-SCNC: 16 MMOL/L — HIGH (ref 7–14)
BUN SERPL-MCNC: 57 MG/DL — HIGH (ref 7–23)
CALCIUM SERPL-MCNC: 9 MG/DL — SIGNIFICANT CHANGE UP (ref 8.4–10.5)
CHLORIDE SERPL-SCNC: 99 MMOL/L — SIGNIFICANT CHANGE UP (ref 98–107)
CO2 SERPL-SCNC: 14 MMOL/L — LOW (ref 22–31)
CREAT SERPL-MCNC: 1.77 MG/DL — HIGH (ref 0.5–1.3)
EGFR: 33 ML/MIN/1.73M2 — LOW
GLUCOSE BLDC GLUCOMTR-MCNC: 172 MG/DL — HIGH (ref 70–99)
GLUCOSE BLDC GLUCOMTR-MCNC: 174 MG/DL — HIGH (ref 70–99)
GLUCOSE BLDC GLUCOMTR-MCNC: 231 MG/DL — HIGH (ref 70–99)
GLUCOSE BLDC GLUCOMTR-MCNC: 266 MG/DL — HIGH (ref 70–99)
GLUCOSE SERPL-MCNC: 124 MG/DL — HIGH (ref 70–99)
HCT VFR BLD CALC: 25.3 % — LOW (ref 34.5–45)
HGB BLD-MCNC: 8 G/DL — LOW (ref 11.5–15.5)
MAGNESIUM SERPL-MCNC: 1.7 MG/DL — SIGNIFICANT CHANGE UP (ref 1.6–2.6)
MCHC RBC-ENTMCNC: 25.6 PG — LOW (ref 27–34)
MCHC RBC-ENTMCNC: 31.6 GM/DL — LOW (ref 32–36)
MCV RBC AUTO: 80.8 FL — SIGNIFICANT CHANGE UP (ref 80–100)
NRBC # BLD: 0 /100 WBCS — SIGNIFICANT CHANGE UP (ref 0–0)
NRBC # FLD: 0 K/UL — SIGNIFICANT CHANGE UP (ref 0–0)
PHOSPHATE SERPL-MCNC: 5 MG/DL — HIGH (ref 2.5–4.5)
PLATELET # BLD AUTO: 144 K/UL — LOW (ref 150–400)
POTASSIUM SERPL-MCNC: 5.1 MMOL/L — SIGNIFICANT CHANGE UP (ref 3.5–5.3)
POTASSIUM SERPL-SCNC: 5.1 MMOL/L — SIGNIFICANT CHANGE UP (ref 3.5–5.3)
RBC # BLD: 3.13 M/UL — LOW (ref 3.8–5.2)
RBC # FLD: 14.1 % — SIGNIFICANT CHANGE UP (ref 10.3–14.5)
SODIUM SERPL-SCNC: 129 MMOL/L — LOW (ref 135–145)
WBC # BLD: 7.65 K/UL — SIGNIFICANT CHANGE UP (ref 3.8–10.5)
WBC # FLD AUTO: 7.65 K/UL — SIGNIFICANT CHANGE UP (ref 3.8–10.5)

## 2023-08-06 PROCEDURE — 99233 SBSQ HOSP IP/OBS HIGH 50: CPT

## 2023-08-06 PROCEDURE — 93306 TTE W/DOPPLER COMPLETE: CPT | Mod: 26

## 2023-08-06 RX ORDER — BUMETANIDE 0.25 MG/ML
1 INJECTION INTRAMUSCULAR; INTRAVENOUS ONCE
Refills: 0 | Status: COMPLETED | OUTPATIENT
Start: 2023-08-06 | End: 2023-08-06

## 2023-08-06 RX ADMIN — Medication 100 MILLIGRAM(S): at 21:55

## 2023-08-06 RX ADMIN — Medication 1300 MILLIGRAM(S): at 05:45

## 2023-08-06 RX ADMIN — ATORVASTATIN CALCIUM 40 MILLIGRAM(S): 80 TABLET, FILM COATED ORAL at 22:01

## 2023-08-06 RX ADMIN — BUMETANIDE 1 MILLIGRAM(S): 0.25 INJECTION INTRAMUSCULAR; INTRAVENOUS at 17:22

## 2023-08-06 RX ADMIN — BUMETANIDE 1 MILLIGRAM(S): 0.25 INJECTION INTRAMUSCULAR; INTRAVENOUS at 05:44

## 2023-08-06 RX ADMIN — Medication 125 MICROGRAM(S): at 05:44

## 2023-08-06 RX ADMIN — Medication 1: at 17:21

## 2023-08-06 RX ADMIN — Medication 1: at 08:59

## 2023-08-06 RX ADMIN — AMLODIPINE BESYLATE 5 MILLIGRAM(S): 2.5 TABLET ORAL at 05:45

## 2023-08-06 RX ADMIN — Medication 100 MILLIGRAM(S): at 05:44

## 2023-08-06 RX ADMIN — Medication 2: at 12:20

## 2023-08-06 RX ADMIN — Medication 1300 MILLIGRAM(S): at 21:55

## 2023-08-06 RX ADMIN — Medication 100 MILLIGRAM(S): at 15:00

## 2023-08-06 RX ADMIN — Medication 1300 MILLIGRAM(S): at 15:01

## 2023-08-06 RX ADMIN — PANTOPRAZOLE SODIUM 40 MILLIGRAM(S): 20 TABLET, DELAYED RELEASE ORAL at 06:11

## 2023-08-06 RX ADMIN — Medication 1: at 21:56

## 2023-08-06 NOTE — PROGRESS NOTE ADULT - PROBLEM SELECTOR PLAN 1
- presenting with complaints of SOB/MARKS  - pt with normocytic anemia, prior iron studies demonstrate elevated ferritin, consistent with anemia of chronic disease with suspected component of anemia of CKD  - hgb 7.2 on 8/4, was transfused 1u PRBCs for symptomatic anemia; hgb appropriately improved to 8.4  - hgb currently stable at 8  - no serological evidence of hemolysis noted on labs  - no evidence of active bleeding; pt has chronically dark stools due to iron intake  - pt had last colonoscopy 1.5 years ago, was supposed to repeat but never followed up  - ACP discussed with GI, deferring evaluation at this time given stable hgb, no evidence of overt bleeding and currently being treated for HF/hypervolemia with IV diuresis  - continue to monitor hgb

## 2023-08-06 NOTE — PROGRESS NOTE ADULT - PROBLEM SELECTOR PLAN 4
- on oral agents at home (hold while inpatient)  - low dose ISS hs and ac.  - A1C 6.3 in June  - monitor FS and adjust regimen as needed    Essential HTN  - on hydralazine and amlodipine  - monitor BP and adjust regimen as needed    HLD  - c/w statin

## 2023-08-06 NOTE — PROGRESS NOTE ADULT - PROBLEM SELECTOR PLAN 6
VTE ppx: SCDs given anemia  Consistent carb, low K diet    Dispo: pending further clinical improvement, anticipate home

## 2023-08-06 NOTE — PROGRESS NOTE ADULT - PROBLEM SELECTOR PLAN 3
- pt with CKD 3-4, baseline cr approximately 2  - Cr 1.77 today, stable  - monitor renal function/UOP, avoid nephrotoxins  - metabolic acidosis, c/w sodium bicarb 1300 mg TID.  - hyponatremia potentially 2/2 CHF vs renal dysfunction, continue to monitor Na level, consider urine studies if worsening

## 2023-08-06 NOTE — PROGRESS NOTE ADULT - SUBJECTIVE AND OBJECTIVE BOX
[facility] Medicine  Pager [phone]    Patient is a 58y old  Female who presents with a chief complaint of Anemia (05 Aug 2023 11:00)      INTERVAL HPI/OVERNIGHT EVENTS:    MEDICATIONS  (STANDING):  amLODIPine   Tablet 5 milliGRAM(s) Oral daily  atorvastatin 40 milliGRAM(s) Oral at bedtime  dextrose 5%. 1000 milliLiter(s) (50 mL/Hr) IV Continuous <Continuous>  dextrose 5%. 1000 milliLiter(s) (100 mL/Hr) IV Continuous <Continuous>  dextrose 50% Injectable 25 Gram(s) IV Push once  dextrose 50% Injectable 12.5 Gram(s) IV Push once  dextrose 50% Injectable 25 Gram(s) IV Push once  glucagon  Injectable 1 milliGRAM(s) IntraMuscular once  hydrALAZINE 100 milliGRAM(s) Oral every 8 hours  insulin lispro (ADMELOG) corrective regimen sliding scale   SubCutaneous three times a day before meals  insulin lispro (ADMELOG) corrective regimen sliding scale   SubCutaneous at bedtime  levothyroxine 125 MICROGram(s) Oral daily  pantoprazole    Tablet 40 milliGRAM(s) Oral before breakfast  sodium bicarbonate 1300 milliGRAM(s) Oral three times a day    MEDICATIONS  (PRN):  dextrose Oral Gel 15 Gram(s) Oral once PRN Blood Glucose LESS THAN 70 milliGRAM(s)/deciliter      Allergies    No Known Allergies    Intolerances        REVIEW OF SYSTEMS:  Please see interval HPI:    Vital Signs Last 24 Hrs  T(C): 36.4 (06 Aug 2023 05:23), Max: 36.6 (05 Aug 2023 10:43)  T(F): 97.6 (06 Aug 2023 05:23), Max: 97.9 (05 Aug 2023 10:43)  HR: 69 (06 Aug 2023 05:23) (65 - 71)  BP: 138/62 (06 Aug 2023 05:23) (138/62 - 165/75)  BP(mean): --  RR: 18 (06 Aug 2023 05:23) (18 - 18)  SpO2: 100% (06 Aug 2023 05:23) (95% - 100%)    Parameters below as of 06 Aug 2023 05:23  Patient On (Oxygen Delivery Method): nasal cannula  O2 Flow (L/min): 1    I&O's Detail    05 Aug 2023 07:01  -  06 Aug 2023 07:00  --------------------------------------------------------  IN:    Oral Fluid: 200 mL  Total IN: 200 mL    OUT:    Voided (mL): 125 mL  Total OUT: 125 mL    Total NET: 75 mL            PHYSICAL EXAM:  GENERAL:   HEAD:    EYES:   ENMT:   NECK:   NERVOUS SYSTEM:    CHEST/LUNG:   HEART:   ABDOMEN:   EXTREMITIES:    LYMPH:   SKIN:     LABS:                        8.0    7.65  )-----------( 144      ( 06 Aug 2023 05:35 )             25.3     06 Aug 2023 05:35    129    |  99     |  57     ----------------------------<  124    5.1     |  14     |  1.77     Ca    9.0        06 Aug 2023 05:35  Phos  5.0       06 Aug 2023 05:35  Mg     1.70      06 Aug 2023 05:35    TPro  7.0    /  Alb  3.9    /  TBili  0.8    /  DBili  x      /  AST  53     /  ALT  75     /  AlkPhos  391    05 Aug 2023 11:30    PT/INR - ( 04 Aug 2023 19:23 )   PT: 12.4 sec;   INR: 1.11 ratio         PTT - ( 04 Aug 2023 19:23 )  PTT:37.8 sec  CAPILLARY BLOOD GLUCOSE      POCT Blood Glucose.: 172 mg/dL (06 Aug 2023 08:46)  POCT Blood Glucose.: 221 mg/dL (05 Aug 2023 21:13)  POCT Blood Glucose.: 188 mg/dL (05 Aug 2023 17:49)  POCT Blood Glucose.: 169 mg/dL (05 Aug 2023 12:10)    BLOOD CULTURE    RADIOLOGY & ADDITIONAL TESTS:    Imaging Personally Reviewed:  [ ] YES     Consultant(s) Notes Reviewed:      Care Discussed with Consultants/Other Providers: [facility] Medicine  Pager [phone]    Patient is a 58y old  Female who presents with a chief complaint of Anemia (05 Aug 2023 11:00)      INTERVAL HPI/OVERNIGHT EVENTS:  Reports breathing much better today compared to yesterday though still little SOB, unable to lie flat. Reports having felt worse after the blood transfusion, thought she needed more diuresis. Denies lower extremity swelling at this time, no CP. Agreeable to continued optimization though is concerned about costs of hospitalization (copay is high).    MEDICATIONS  (STANDING):  amLODIPine   Tablet 5 milliGRAM(s) Oral daily  atorvastatin 40 milliGRAM(s) Oral at bedtime  dextrose 5%. 1000 milliLiter(s) (50 mL/Hr) IV Continuous <Continuous>  dextrose 5%. 1000 milliLiter(s) (100 mL/Hr) IV Continuous <Continuous>  dextrose 50% Injectable 25 Gram(s) IV Push once  dextrose 50% Injectable 12.5 Gram(s) IV Push once  dextrose 50% Injectable 25 Gram(s) IV Push once  glucagon  Injectable 1 milliGRAM(s) IntraMuscular once  hydrALAZINE 100 milliGRAM(s) Oral every 8 hours  insulin lispro (ADMELOG) corrective regimen sliding scale   SubCutaneous three times a day before meals  insulin lispro (ADMELOG) corrective regimen sliding scale   SubCutaneous at bedtime  levothyroxine 125 MICROGram(s) Oral daily  pantoprazole    Tablet 40 milliGRAM(s) Oral before breakfast  sodium bicarbonate 1300 milliGRAM(s) Oral three times a day    MEDICATIONS  (PRN):  dextrose Oral Gel 15 Gram(s) Oral once PRN Blood Glucose LESS THAN 70 milliGRAM(s)/deciliter      Allergies    No Known Allergies    Intolerances        REVIEW OF SYSTEMS:  Please see interval HPI:    Vital Signs Last 24 Hrs  T(C): 36.4 (06 Aug 2023 05:23), Max: 36.6 (05 Aug 2023 10:43)  T(F): 97.6 (06 Aug 2023 05:23), Max: 97.9 (05 Aug 2023 10:43)  HR: 69 (06 Aug 2023 05:23) (65 - 71)  BP: 138/62 (06 Aug 2023 05:23) (138/62 - 165/75)  BP(mean): --  RR: 18 (06 Aug 2023 05:23) (18 - 18)  SpO2: 100% (06 Aug 2023 05:23) (95% - 100%)    Parameters below as of 06 Aug 2023 05:23  Patient On (Oxygen Delivery Method): nasal cannula  O2 Flow (L/min): 1    I&O's Detail    05 Aug 2023 07:01  -  06 Aug 2023 07:00  --------------------------------------------------------  IN:    Oral Fluid: 200 mL  Total IN: 200 mL    OUT:    Voided (mL): 125 mL  Total OUT: 125 mL    Total NET: 75 mL      PHYSICAL EXAM:  GENERAL: NAD, sitting in bed  HEAD:  NC/AT  EYES: EOMI, clear slcera/conjunctiva  ENMT: MMM, hearing intact to voice  NECK: supple, no JVD  NERVOUS SYSTEM:  moving all extremities, non-focal  CHEST/LUNG: mild tachypnea, but able to speak in sentences, no wheezing but +bibasilar crackles, on supplemental O2 via NC  HEART: S1S2 RRR  ABDOMEN: soft, non-tender, +BS  EXTREMITIES:  no c/c/e    LABS:                        8.0    7.65  )-----------( 144      ( 06 Aug 2023 05:35 )             25.3     06 Aug 2023 05:35    129    |  99     |  57     ----------------------------<  124    5.1     |  14     |  1.77     Ca    9.0        06 Aug 2023 05:35  Phos  5.0       06 Aug 2023 05:35  Mg     1.70      06 Aug 2023 05:35    TPro  7.0    /  Alb  3.9    /  TBili  0.8    /  DBili  x      /  AST  53     /  ALT  75     /  AlkPhos  391    05 Aug 2023 11:30    PT/INR - ( 04 Aug 2023 19:23 )   PT: 12.4 sec;   INR: 1.11 ratio    PTT - ( 04 Aug 2023 19:23 )  PTT:37.8 sec    CAPILLARY BLOOD GLUCOSE  POCT Blood Glucose.: 172 mg/dL (06 Aug 2023 08:46)  POCT Blood Glucose.: 221 mg/dL (05 Aug 2023 21:13)  POCT Blood Glucose.: 188 mg/dL (05 Aug 2023 17:49)  POCT Blood Glucose.: 169 mg/dL (05 Aug 2023 12:10)      BLOOD CULTURE    RADIOLOGY & ADDITIONAL TESTS:    Imaging Personally Reviewed:  [ ] YES   < from: Xray Chest 2 Views PA/Lat (08.04.23 @ 18:28) >    IMPRESSION:  Small left pleural effusion.  Redemonstration of diffuse bilateral hazy/reticular opacities, as   compared to chest radiograph from 6/22/2023    < end of copied text >    < from: Transthoracic Echocardiogram (08.06.23 @ 12:30) >  EF 69%    CONCLUSIONS:  1. Normal left ventricular systolic function. No segmental  wall motion abnormalities.  2. Right ventricular enlargement with normal right  ventricular systolic function.  3. Normal tricuspid valve.Moderate-severe tricuspid  regurgitation.  4. Estimated pulmonary artery systolic pressure equals 65  mm Hg, assuming right atrial pressure equals 10  mm Hg,  consistent with severe pulmonary hypertension.  *** Compared with echocardiogram of 2/27/2023, no  significant changes noted.    < end of copied text >      Consultant(s) Notes Reviewed:  otpt records via [facility] (renal visit 7/21 w/ Dr. Coyle Endo review of glucose readings 7/5, pulm visit 6/14 (reinforced importance of BP control and fluid regulation), cards 5/22 c/w diuresis), records from prior hospitalization June.     Care Discussed with Consultants/Other Providers: SHARON Parra re: bibasilar crackles on exam, continue IV diuresis today, potential transition back to PO regimen tomorrow. She discussed with GI, holding off on evaluation given hgb stable and lack of active bleeding, also with CHF requiring diuresis.

## 2023-08-06 NOTE — PROGRESS NOTE ADULT - PROBLEM SELECTOR PLAN 2
- pt with moderate diastolic dysfunction on prior echo  - on Bumex 1 mg BID at home  - proBNP 4808, noted to have edema/hypervolemia on admission exam, hazy opacities on CXR  - ?exacerbated by volume from blood transfusion??  - s/p IV diuresis, still w/ some bibasilar crackles on exam, will give another dose of IV bumex this evening, reassess re: potentially transitioning patient back to oral diuretic regimen in AM  - echo here showing EF 69%, normal LVSF, mod-severe TR  - strict i's and o's, daily weights.  - wean supplemental O2 as tolerated

## 2023-08-06 NOTE — PROGRESS NOTE ADULT - TIME BILLING
Reviewing labs/imaging/echo/vitals, prior hospital records (hospitalized in June), otpt records, interviewing/examining patient, coordinating care

## 2023-08-06 NOTE — PROGRESS NOTE ADULT - ASSESSMENT
59 yo F w/ HTN, HLD, DM2, CKD (stage 3-4), hypothyroidism (c/b myxedema coma in past), severe pulmonary hypertension, mod-severe TR, asthma, presenting for symptomatic anemia, s/p pRBC transfusion, also with concern for volume overload.

## 2023-08-07 ENCOUNTER — TRANSCRIPTION ENCOUNTER (OUTPATIENT)
Age: 58
End: 2023-08-07

## 2023-08-07 VITALS — OXYGEN SATURATION: 98 % | RESPIRATION RATE: 18 BRPM | WEIGHT: 138.45 LBS

## 2023-08-07 LAB
ANION GAP SERPL CALC-SCNC: 9 MMOL/L — SIGNIFICANT CHANGE UP (ref 7–14)
BUN SERPL-MCNC: 58 MG/DL — HIGH (ref 7–23)
CALCIUM SERPL-MCNC: 9.1 MG/DL — SIGNIFICANT CHANGE UP (ref 8.4–10.5)
CHLORIDE SERPL-SCNC: 97 MMOL/L — LOW (ref 98–107)
CO2 SERPL-SCNC: 22 MMOL/L — SIGNIFICANT CHANGE UP (ref 22–31)
CREAT SERPL-MCNC: 1.97 MG/DL — HIGH (ref 0.5–1.3)
EGFR: 29 ML/MIN/1.73M2 — LOW
GLUCOSE BLDC GLUCOMTR-MCNC: 130 MG/DL — HIGH (ref 70–99)
GLUCOSE BLDC GLUCOMTR-MCNC: 286 MG/DL — HIGH (ref 70–99)
GLUCOSE SERPL-MCNC: 109 MG/DL — HIGH (ref 70–99)
HCT VFR BLD CALC: 25.9 % — LOW (ref 34.5–45)
HGB BLD-MCNC: 8.4 G/DL — LOW (ref 11.5–15.5)
MAGNESIUM SERPL-MCNC: 1.7 MG/DL — SIGNIFICANT CHANGE UP (ref 1.6–2.6)
MCHC RBC-ENTMCNC: 25.8 PG — LOW (ref 27–34)
MCHC RBC-ENTMCNC: 32.4 GM/DL — SIGNIFICANT CHANGE UP (ref 32–36)
MCV RBC AUTO: 79.7 FL — LOW (ref 80–100)
NRBC # BLD: 0 /100 WBCS — SIGNIFICANT CHANGE UP (ref 0–0)
NRBC # FLD: 0 K/UL — SIGNIFICANT CHANGE UP (ref 0–0)
PHOSPHATE SERPL-MCNC: 5.1 MG/DL — HIGH (ref 2.5–4.5)
PLATELET # BLD AUTO: 152 K/UL — SIGNIFICANT CHANGE UP (ref 150–400)
POTASSIUM SERPL-MCNC: 5.2 MMOL/L — SIGNIFICANT CHANGE UP (ref 3.5–5.3)
POTASSIUM SERPL-SCNC: 5.2 MMOL/L — SIGNIFICANT CHANGE UP (ref 3.5–5.3)
RBC # BLD: 3.25 M/UL — LOW (ref 3.8–5.2)
RBC # FLD: 13.9 % — SIGNIFICANT CHANGE UP (ref 10.3–14.5)
SODIUM SERPL-SCNC: 128 MMOL/L — LOW (ref 135–145)
WBC # BLD: 7.23 K/UL — SIGNIFICANT CHANGE UP (ref 3.8–10.5)
WBC # FLD AUTO: 7.23 K/UL — SIGNIFICANT CHANGE UP (ref 3.8–10.5)

## 2023-08-07 PROCEDURE — 99232 SBSQ HOSP IP/OBS MODERATE 35: CPT

## 2023-08-07 RX ORDER — SODIUM BICARBONATE 1 MEQ/ML
2 SYRINGE (ML) INTRAVENOUS
Qty: 180 | Refills: 0
Start: 2023-08-07 | End: 2023-09-05

## 2023-08-07 RX ORDER — OMEPRAZOLE 10 MG/1
1 CAPSULE, DELAYED RELEASE ORAL
Refills: 0 | DISCHARGE

## 2023-08-07 RX ORDER — BUMETANIDE 0.25 MG/ML
1 INJECTION INTRAMUSCULAR; INTRAVENOUS
Refills: 0 | Status: DISCONTINUED | OUTPATIENT
Start: 2023-08-07 | End: 2023-08-07

## 2023-08-07 RX ORDER — PANTOPRAZOLE SODIUM 20 MG/1
1 TABLET, DELAYED RELEASE ORAL
Qty: 30 | Refills: 0
Start: 2023-08-07 | End: 2023-09-05

## 2023-08-07 RX ADMIN — PANTOPRAZOLE SODIUM 40 MILLIGRAM(S): 20 TABLET, DELAYED RELEASE ORAL at 06:03

## 2023-08-07 RX ADMIN — Medication 100 MILLIGRAM(S): at 14:04

## 2023-08-07 RX ADMIN — Medication 1300 MILLIGRAM(S): at 06:04

## 2023-08-07 RX ADMIN — BUMETANIDE 1 MILLIGRAM(S): 0.25 INJECTION INTRAMUSCULAR; INTRAVENOUS at 12:42

## 2023-08-07 RX ADMIN — Medication 125 MICROGRAM(S): at 06:04

## 2023-08-07 RX ADMIN — Medication 100 MILLIGRAM(S): at 06:03

## 2023-08-07 RX ADMIN — Medication 3: at 12:42

## 2023-08-07 RX ADMIN — Medication 1300 MILLIGRAM(S): at 14:04

## 2023-08-07 RX ADMIN — AMLODIPINE BESYLATE 5 MILLIGRAM(S): 2.5 TABLET ORAL at 06:03

## 2023-08-07 NOTE — DISCHARGE NOTE PROVIDER - NSDCCPTREATMENT_GEN_ALL_CORE_FT
PRINCIPAL PROCEDURE  Procedure: 2D echo  Findings and Treatment: CONCLUSIONS:  1. Normal left ventricular systolic function. No segmental  wall motion abnormalities.  2. Right ventricular enlargement with normal right  ventricular systolic function.  3. Normal tricuspid valve.Moderate-severe tricuspid  regurgitation.  4. Estimated pulmonary artery systolic pressure equals 65  mm Hg, assuming right atrial pressure equals 10  mm Hg,  consistent with severe pulmonary hypertension.  *** Compared with echocardiogram of 2/27/2023, no  significant changes noted.

## 2023-08-07 NOTE — DISCHARGE NOTE PROVIDER - NSDCFUSCHEDAPPT_GEN_ALL_CORE_FT
Adriana Recinos  [facility] Physician [facility]  ENDOCRIN [address]  Scheduled Appointment: 09/26/2023    Jonel Coyle  [facility]  NEPHRO [address]   Scheduled Appointment: 10/20/2023

## 2023-08-07 NOTE — DISCHARGE NOTE PROVIDER - CARE PROVIDERS DIRECT ADDRESSES
,DirectAddress_Unknown,[email or web address],DirectAddress_Unknown

## 2023-08-07 NOTE — DISCHARGE NOTE NURSING/CASE MANAGEMENT/SOCIAL WORK - NSDCPEPTCAREGIVEDUMATLIST _GEN_ALL_CORE
Heart Failure/Diabetes
Implemented All Universal Safety Interventions:  [place] to call system. Call bell, personal items and telephone within reach. Instruct patient to call for assistance. Room bathroom lighting operational. Non-slip footwear when patient is off stretcher. Physically safe environment: no spills, clutter or unnecessary equipment. Stretcher in lowest position, wheels locked, appropriate side rails in place.

## 2023-08-07 NOTE — DISCHARGE NOTE PROVIDER - NSDCMRMEDTOKEN_GEN_ALL_CORE_FT
amLODIPine 5 mg oral tablet: 1 orally  atorvastatin 40 mg oral tablet: 1 tab(s) orally once a day (at bedtime)  bumetanide 1 mg oral tablet: 1 tab(s) orally 2 times a day  hydrALAZINE 100 mg oral tablet: 1 tab(s) orally every 8 hours  levothyroxine 125 mcg (0.125 mg) oral tablet: 1 tab(s) orally once a day  pantoprazole 40 mg oral delayed release tablet: 1 tab(s) orally once a day (before a meal)  repaglinide 0.5 mg oral tablet: 1 tab(s) orally 3 times a day  sodium bicarbonate 650 mg oral tablet: 2 tab(s) orally 3 times a day

## 2023-08-07 NOTE — DISCHARGE NOTE PROVIDER - CARE PROVIDER_API CALL
Adriana Recinos  NP in Family Health  [address], [address]  [place] [address]  Phone: [phone]  Fax: [phone]  Follow Up Time:     Jonel Coyle  Nephrology  [address] [place] [address]  Phone: [phone]  Fax: [phone]  Follow Up Time:     Bryan Fernandez  [facility] Medicine  [address] [place] [address]  Phone: [phone]  Fax: [phone]  Follow Up Time:

## 2023-08-07 NOTE — DISCHARGE NOTE PROVIDER - ATTENDING DISCHARGE PHYSICAL EXAMINATION:
T(C): 36.8 (08-07-23 @ 06:00), Max: 36.8 (08-07-23 @ 06:00)  HR: 74 (08-07-23 @ 06:00) (64 - 74)  BP: 158/61 (08-07-23 @ 06:00) (144/60 - 158/61)  RR: 18 (08-07-23 @ 06:00) (18 - 18)  SpO2: 100% (08-07-23 @ 06:00) (98% - 100%)    CONSTITUTIONAL: Well groomed, no apparent distress  EYES: PERRLA and symmetric, EOMI, No conjunctival or scleral injection, non-icteric  RESP: No respiratory distress, no use of accessory muscles; CTA b/l, no WRR  CV: RRR, +S1S2, no MRG; no JVD; no peripheral edema  GI: Soft, NT, ND, no rebound, no guarding; no palpable masses; no hepatosplenomegaly; no hernia palpated  LYMPH: No cervical LAD or tenderness; no axillary LAD or tenderness; no inguinal LAD or tenderness  MSK: Normal gait; No digital clubbing or cyanosis; examination of the (head/neck/spine/ribs/pelvis, RUE, LUE, RLE, LLE) without misalignment,            Normal ROM without pain, no spinal tenderness, normal muscle strength/tone  SKIN: No rashes or ulcers noted; no subcutaneous nodules or induration palpable  NEURO: CN II-XII intact; normal reflexes in upper and lower extremities, sensation intact in upper and lower extremities b/l to light touch   PSYCH: Appropriate insight/judgment; A+O x 3, mood and affect appropriate, recent/remote memory intact

## 2023-08-07 NOTE — DISCHARGE NOTE PROVIDER - PROVIDER TOKENS
PROVIDER:[TOKEN:[[id number]]],PROVIDER:[TOKEN:[[id number]]],PROVIDER:[TOKEN:[[id number]]]

## 2023-08-07 NOTE — DISCHARGE NOTE PROVIDER - NSDCFUADDAPPT_GEN_ALL_CORE_FT
Continue medications as prescribed. Follow up with your primary care doctor, nephrologist, and endocrinologist for further evaluation and management. Please call to make an appointment within 1-2 weeks of discharge.

## 2023-08-07 NOTE — DISCHARGE NOTE PROVIDER - HOSPITAL COURSE
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 Pt is s/p 1U pRBC with appropriate response. Hemoglobin stabilized at 8. Case d/w GI who recommended outpatient follow up for workup of anemia. Pt is due for repeat colonoscopy.    # Acute on chronic diastolic heart failure- improved with IV diuretics. HF likely exacerbated by blood transfusion. Pt now transitioned to PO diuretics and weaned off of supplemental oxygen.    To Do:  [ ] outpatient GI follow up for colonoscopy and anemia workup  [ ] PMD follow up in 1-2 weeks for repeat CBC and BMP  [ ] continue bumex 1mg BID

## 2023-08-07 NOTE — DISCHARGE NOTE NURSING/CASE MANAGEMENT/SOCIAL WORK - PATIENT PORTAL LINK FT
You can access the FollowMyHealth Patient Portal offered by [facility] by registering at the following website: [email or web address]. By joining [employer]’s FollowMyHealth portal, you will also be able to view your health information using other applications (apps) compatible with our system.

## 2023-08-07 NOTE — DISCHARGE NOTE PROVIDER - NSDCCPCAREPLAN_GEN_ALL_CORE_FT
PRINCIPAL DISCHARGE DIAGNOSIS  Diagnosis: Dyspnea on exertion  Assessment and Plan of Treatment: You were found to have worsening shortness of breath due to fluid overload from the blood transfusion. You recieved IV diuretics and are now transitioned back to pill diuretics. Continue to take bumex 1mg two times per day and the rest of your medications as directed. Follow up with your PMD within one week.  Return to the ED if you have shortness of breath, chest pain, leg swelling, or if any other concerning symptoms arise.      SECONDARY DISCHARGE DIAGNOSES  Diagnosis: Anemia  Assessment and Plan of Treatment: You were found to have low hemoglobin and recieved one unit of blood transfusion. Your hemoglobin has been stable since transfusion. Follow up with your PMD within 1 week for repeat blood work. Follow up with a GI doctor within one month to have a colonoscopy performed. Return to the ED if you have any bleeding, bloody stools, black stools, or if any other concerning symptoms arise.

## 2023-08-08 LAB — EPO SERPL-MCNC: 7.5 MIU/ML — SIGNIFICANT CHANGE UP (ref 2.6–18.5)

## 2023-08-11 ENCOUNTER — INPATIENT (INPATIENT)
Facility: HOSPITAL | Age: 58
LOS: 18 days | Discharge: HOME | End: 2023-08-30
Attending: HOSPITALIST | Admitting: HOSPITALIST
Payer: COMMERCIAL

## 2023-08-11 VITALS
HEIGHT: 62 IN | TEMPERATURE: 99 F | HEART RATE: 78 BPM | RESPIRATION RATE: 16 BRPM | OXYGEN SATURATION: 100 % | DIASTOLIC BLOOD PRESSURE: 72 MMHG | SYSTOLIC BLOOD PRESSURE: 160 MMHG

## 2023-08-11 DIAGNOSIS — Z98.49 CATARACT EXTRACTION STATUS, UNSPECIFIED EYE: Chronic | ICD-10-CM

## 2023-08-11 LAB
ALBUMIN SERPL ELPH-MCNC: 2.9 G/DL — LOW (ref 3.3–5)
ALBUMIN SERPL ELPH-MCNC: 4 G/DL — SIGNIFICANT CHANGE UP (ref 3.3–5)
ALP SERPL-CCNC: 321 U/L — HIGH (ref 40–120)
ALP SERPL-CCNC: 443 U/L — HIGH (ref 40–120)
ALT FLD-CCNC: 40 U/L — HIGH (ref 4–33)
ALT FLD-CCNC: 51 U/L — HIGH (ref 4–33)
ANION GAP SERPL CALC-SCNC: 12 MMOL/L — SIGNIFICANT CHANGE UP (ref 7–14)
ANION GAP SERPL CALC-SCNC: 15 MMOL/L — HIGH (ref 7–14)
APTT BLD: 38.9 SEC — HIGH (ref 24.5–35.6)
AST SERPL-CCNC: 32 U/L — SIGNIFICANT CHANGE UP (ref 4–32)
AST SERPL-CCNC: 34 U/L — HIGH (ref 4–32)
B PERT DNA SPEC QL NAA+PROBE: SIGNIFICANT CHANGE UP
B PERT+PARAPERT DNA PNL SPEC NAA+PROBE: SIGNIFICANT CHANGE UP
BASE EXCESS BLDV CALC-SCNC: -4.7 MMOL/L — LOW (ref -2–3)
BASOPHILS # BLD AUTO: 0.03 K/UL — SIGNIFICANT CHANGE UP (ref 0–0.2)
BASOPHILS NFR BLD AUTO: 0.4 % — SIGNIFICANT CHANGE UP (ref 0–2)
BILIRUB SERPL-MCNC: 0.4 MG/DL — SIGNIFICANT CHANGE UP (ref 0.2–1.2)
BILIRUB SERPL-MCNC: 0.6 MG/DL — SIGNIFICANT CHANGE UP (ref 0.2–1.2)
BLD GP AB SCN SERPL QL: NEGATIVE — SIGNIFICANT CHANGE UP
BLOOD GAS VENOUS COMPREHENSIVE RESULT: SIGNIFICANT CHANGE UP
BORDETELLA PARAPERTUSSIS (RAPRVP): SIGNIFICANT CHANGE UP
BUN SERPL-MCNC: 45 MG/DL — HIGH (ref 7–23)
BUN SERPL-MCNC: 53 MG/DL — HIGH (ref 7–23)
C PNEUM DNA SPEC QL NAA+PROBE: SIGNIFICANT CHANGE UP
CALCIUM SERPL-MCNC: 7.2 MG/DL — LOW (ref 8.4–10.5)
CALCIUM SERPL-MCNC: 9.4 MG/DL — SIGNIFICANT CHANGE UP (ref 8.4–10.5)
CHLORIDE BLDV-SCNC: 101 MMOL/L — SIGNIFICANT CHANGE UP (ref 96–108)
CHLORIDE SERPL-SCNC: 108 MMOL/L — HIGH (ref 98–107)
CHLORIDE SERPL-SCNC: 98 MMOL/L — SIGNIFICANT CHANGE UP (ref 98–107)
CO2 BLDV-SCNC: 22.4 MMOL/L — SIGNIFICANT CHANGE UP (ref 22–26)
CO2 SERPL-SCNC: 15 MMOL/L — LOW (ref 22–31)
CO2 SERPL-SCNC: 20 MMOL/L — LOW (ref 22–31)
CREAT SERPL-MCNC: 1.62 MG/DL — HIGH (ref 0.5–1.3)
CREAT SERPL-MCNC: 2.19 MG/DL — HIGH (ref 0.5–1.3)
EGFR: 26 ML/MIN/1.73M2 — LOW
EGFR: 37 ML/MIN/1.73M2 — LOW
EOSINOPHIL # BLD AUTO: 0.23 K/UL — SIGNIFICANT CHANGE UP (ref 0–0.5)
EOSINOPHIL NFR BLD AUTO: 2.8 % — SIGNIFICANT CHANGE UP (ref 0–6)
FLUAV SUBTYP SPEC NAA+PROBE: SIGNIFICANT CHANGE UP
FLUBV RNA SPEC QL NAA+PROBE: SIGNIFICANT CHANGE UP
GAS PNL BLDV: 132 MMOL/L — LOW (ref 136–145)
GAS PNL BLDV: SIGNIFICANT CHANGE UP
GLUCOSE BLDV-MCNC: 237 MG/DL — HIGH (ref 70–99)
GLUCOSE SERPL-MCNC: 133 MG/DL — HIGH (ref 70–99)
GLUCOSE SERPL-MCNC: 229 MG/DL — HIGH (ref 70–99)
HADV DNA SPEC QL NAA+PROBE: SIGNIFICANT CHANGE UP
HCO3 BLDV-SCNC: 21 MMOL/L — LOW (ref 22–29)
HCOV 229E RNA SPEC QL NAA+PROBE: SIGNIFICANT CHANGE UP
HCOV HKU1 RNA SPEC QL NAA+PROBE: SIGNIFICANT CHANGE UP
HCOV NL63 RNA SPEC QL NAA+PROBE: SIGNIFICANT CHANGE UP
HCOV OC43 RNA SPEC QL NAA+PROBE: SIGNIFICANT CHANGE UP
HCT VFR BLD CALC: 24.9 % — LOW (ref 34.5–45)
HCT VFR BLDA CALC: 28 % — LOW (ref 34.5–46.5)
HGB BLD CALC-MCNC: 9.2 G/DL — LOW (ref 11.7–16.1)
HGB BLD-MCNC: 8.3 G/DL — LOW (ref 11.5–15.5)
HMPV RNA SPEC QL NAA+PROBE: SIGNIFICANT CHANGE UP
HPIV1 RNA SPEC QL NAA+PROBE: SIGNIFICANT CHANGE UP
HPIV2 RNA SPEC QL NAA+PROBE: SIGNIFICANT CHANGE UP
HPIV3 RNA SPEC QL NAA+PROBE: SIGNIFICANT CHANGE UP
HPIV4 RNA SPEC QL NAA+PROBE: SIGNIFICANT CHANGE UP
IANC: 6.73 K/UL — SIGNIFICANT CHANGE UP (ref 1.8–7.4)
IMM GRANULOCYTES NFR BLD AUTO: 0.7 % — SIGNIFICANT CHANGE UP (ref 0–0.9)
INR BLD: 1.1 RATIO — SIGNIFICANT CHANGE UP (ref 0.85–1.18)
LACTATE BLDV-MCNC: 1.2 MMOL/L — SIGNIFICANT CHANGE UP (ref 0.5–2)
LYMPHOCYTES # BLD AUTO: 0.58 K/UL — LOW (ref 1–3.3)
LYMPHOCYTES # BLD AUTO: 7 % — LOW (ref 13–44)
M PNEUMO DNA SPEC QL NAA+PROBE: SIGNIFICANT CHANGE UP
MCHC RBC-ENTMCNC: 26.2 PG — LOW (ref 27–34)
MCHC RBC-ENTMCNC: 33.3 GM/DL — SIGNIFICANT CHANGE UP (ref 32–36)
MCV RBC AUTO: 78.5 FL — LOW (ref 80–100)
MONOCYTES # BLD AUTO: 0.61 K/UL — SIGNIFICANT CHANGE UP (ref 0–0.9)
MONOCYTES NFR BLD AUTO: 7.4 % — SIGNIFICANT CHANGE UP (ref 2–14)
NEUTROPHILS # BLD AUTO: 6.73 K/UL — SIGNIFICANT CHANGE UP (ref 1.8–7.4)
NEUTROPHILS NFR BLD AUTO: 81.7 % — HIGH (ref 43–77)
NRBC # BLD: 0 /100 WBCS — SIGNIFICANT CHANGE UP (ref 0–0)
NRBC # FLD: 0 K/UL — SIGNIFICANT CHANGE UP (ref 0–0)
NT-PROBNP SERPL-SCNC: 6228 PG/ML — HIGH
PCO2 BLDV: 41 MMHG — SIGNIFICANT CHANGE UP (ref 39–52)
PH BLDV: 7.32 — SIGNIFICANT CHANGE UP (ref 7.32–7.43)
PLATELET # BLD AUTO: 159 K/UL — SIGNIFICANT CHANGE UP (ref 150–400)
PO2 BLDV: 33 MMHG — SIGNIFICANT CHANGE UP (ref 25–45)
POTASSIUM BLDV-SCNC: 5.7 MMOL/L — HIGH (ref 3.5–5.1)
POTASSIUM SERPL-MCNC: 4.3 MMOL/L — SIGNIFICANT CHANGE UP (ref 3.5–5.3)
POTASSIUM SERPL-MCNC: 5.4 MMOL/L — HIGH (ref 3.5–5.3)
POTASSIUM SERPL-SCNC: 4.3 MMOL/L — SIGNIFICANT CHANGE UP (ref 3.5–5.3)
POTASSIUM SERPL-SCNC: 5.4 MMOL/L — HIGH (ref 3.5–5.3)
PROT SERPL-MCNC: 5.7 G/DL — LOW (ref 6–8.3)
PROT SERPL-MCNC: 8 G/DL — SIGNIFICANT CHANGE UP (ref 6–8.3)
PROTHROM AB SERPL-ACNC: 12.4 SEC — SIGNIFICANT CHANGE UP (ref 9.5–13)
RAPID RVP RESULT: SIGNIFICANT CHANGE UP
RBC # BLD: 3.17 M/UL — LOW (ref 3.8–5.2)
RBC # FLD: 13.5 % — SIGNIFICANT CHANGE UP (ref 10.3–14.5)
RH IG SCN BLD-IMP: POSITIVE — SIGNIFICANT CHANGE UP
RSV RNA SPEC QL NAA+PROBE: SIGNIFICANT CHANGE UP
RV+EV RNA SPEC QL NAA+PROBE: SIGNIFICANT CHANGE UP
SAO2 % BLDV: 56.1 % — LOW (ref 67–88)
SARS-COV-2 RNA SPEC QL NAA+PROBE: SIGNIFICANT CHANGE UP
SODIUM SERPL-SCNC: 133 MMOL/L — LOW (ref 135–145)
SODIUM SERPL-SCNC: 135 MMOL/L — SIGNIFICANT CHANGE UP (ref 135–145)
TROPONIN T, HIGH SENSITIVITY RESULT: 48 NG/L — SIGNIFICANT CHANGE UP
WBC # BLD: 8.24 K/UL — SIGNIFICANT CHANGE UP (ref 3.8–10.5)
WBC # FLD AUTO: 8.24 K/UL — SIGNIFICANT CHANGE UP (ref 3.8–10.5)

## 2023-08-11 PROCEDURE — 99285 EMERGENCY DEPT VISIT HI MDM: CPT

## 2023-08-11 PROCEDURE — 71046 X-RAY EXAM CHEST 2 VIEWS: CPT | Mod: 26

## 2023-08-11 RX ORDER — FUROSEMIDE 40 MG
40 TABLET ORAL ONCE
Refills: 0 | Status: COMPLETED | OUTPATIENT
Start: 2023-08-11 | End: 2023-08-11

## 2023-08-11 RX ORDER — ALBUTEROL 90 UG/1
10 AEROSOL, METERED ORAL ONCE
Refills: 0 | Status: COMPLETED | OUTPATIENT
Start: 2023-08-11 | End: 2023-08-11

## 2023-08-11 RX ADMIN — Medication 40 MILLIGRAM(S): at 23:48

## 2023-08-11 RX ADMIN — ALBUTEROL 10 MILLIGRAM(S): 90 AEROSOL, METERED ORAL at 23:32

## 2023-08-11 NOTE — ED ADULT TRIAGE NOTE - CHIEF COMPLAINT QUOTE
Presents to ED c/o dyspnea on exertion since Monday. History of anemia, DM2, HTN, HF. Pt admitted to hospital recently for blood transfusion.

## 2023-08-11 NOTE — ED PROVIDER NOTE - PROGRESS NOTE DETAILS
Ruthy De La Torre PGY2: Pt continued to be dyspneic and unable to finish full sentences with elevated BNP so will start pt on BiPAP. Ruthy De La Torre PGY2: Labs show elevated BNP and potassium. No EKG changes significant for hyperkalemia. Pt given Lasix and albuterol. Ruthy De La Torre PGY2: Pt reassessed and tolerating BiPAP well. Pt resting comfortably. Admitted to medicine on tele.

## 2023-08-11 NOTE — ED PROVIDER NOTE - PHYSICAL EXAMINATION
Constitutional: VS reviewed. Alert and orientedx3, well appearing, dyspneic unable to complete full sentences  HEENT: Atraumatic, EOMI, PERRL  CV: RRR  Lungs: Tachypneic. Clear and equal bilaterally, no wheezes, rales or crackles  Abdomen: Soft, nondistended, nontender  MSK: No deformities  Skin: Warm and dry. As visualized no rashes, lesions, bruising or erythema  Neuro: Appears nonfocal  Lymph: No pitting edema in extremities.

## 2023-08-11 NOTE — ED ADULT NURSE NOTE - OBJECTIVE STATEMENT
Elian RN  received pt in bed A and ox3 in NAD breathing I rapid and shallow but able to communicate with full and complete sentences, lung  sounds clear B/l. pt co worsening MARKS for the past 3 days, JVD noted, no pedal edema, abd soft non distended non tender, pt's ski I dry and flaky, IV instead labs drawn and sent, pt being evaluated by MD at bedside.

## 2023-08-11 NOTE — ED PROVIDER NOTE - OBJECTIVE STATEMENT
58-year-old female past medical history of anemia, asthma, CHF, CKD, DM presents emergency department for 5 days of worsening dyspnea on exertion.  Patient states she was recently discharged 5 days ago for symptomatic anemia requiring blood transfusion.  Patient states she has had worsening MARKS ED since then.  Patient also endorsing dry cough over the last few days.  Patient denies fevers, chills, headache, vision changes, chest pain, abdominal pain, nausea/vomiting, diarrhea/constipation, dysuria, hematuria.

## 2023-08-11 NOTE — ED PROVIDER NOTE - CLINICAL SUMMARY MEDICAL DECISION MAKING FREE TEXT BOX
58-year-old female past medical history of anemia, asthma, CHF, CKD, DM presents emergency department for 5 days of worsening dyspnea on exertion.  Patient recently discharged 5 days ago after symptomatic anemia requiring blood transfusion.  Patient is having worsening MARKS since then.  Patient having difficulty completing full sentences.  Patient otherwise nontoxic-appearing.  Lungs clear and equal bilaterally.  Tachypneic.  Patient denies fevers, chills, headache no pitting edema in lower extremities.  Patient not requiring supplemental O2 at this time. Differential diagnoses include but not limited to symptomatic anemia, asthma exacerbation, viral illness, CHF exacerbation.  Most likely symptomatic anemia in setting of recent transfusions and low counts.  Plan for labs, chest x-ray and close monitoring.  Dispo likely admission pending lab and imaging results.

## 2023-08-11 NOTE — ED ADULT NURSE NOTE - NSFALLHARMRISKINTERV_ED_ALL_ED
Assistance OOB with selected safe patient handling equipment if applicable/Assistance with ambulation/Communicate risk of Fall with Harm to all staff, patient, and family/Monitor gait and stability/Provide visual cue: red socks, yellow wristband, yellow gown, etc/Reinforce activity limits and safety measures with patient and family/Bed in lowest position, wheels locked, appropriate side rails in place/Call bell, personal items and telephone in reach/Instruct patient to call for assistance before getting out of bed/chair/stretcher/Non-slip footwear applied when patient is off stretcher/[place] to call system/Physically safe environment - no spills, clutter or unnecessary equipment/Purposeful Proactive Rounding/Room/bathroom lighting operational, light cord in reach

## 2023-08-11 NOTE — ED PROVIDER NOTE - ATTENDING CONTRIBUTION TO CARE
58-year-old female past medical history of anemia, asthma, CHF, CKD, diabetes, presenting with complaints of shortness of breath in the setting of congestion and a cough that started when she returned home from the hospital.  Patient was admitted for symptomatic anemia requiring blood transfusion.  Denies any black or bloody stools, no mopped assist, no hematemesis, no hematuria.  On exam, patient short of breath between sentences, tachypneic with accessory muscle use.  No lower extremity edema, no crackles.  For, including proBNP, RVP, chest x-ray.  Differential includes asthma attack, CHF exacerbation, viral URI, pneumonia.  On reassessment, patient with increasing tachypnea, BNP noted to be over 6000, started on BiPAP.  Potassium greater than 5, treated with albuterol and Lasix.  Plan for repeat BMP.  To be admitted.

## 2023-08-12 DIAGNOSIS — I50.9 HEART FAILURE, UNSPECIFIED: ICD-10-CM

## 2023-08-12 DIAGNOSIS — D64.9 ANEMIA, UNSPECIFIED: ICD-10-CM

## 2023-08-12 DIAGNOSIS — E11.9 TYPE 2 DIABETES MELLITUS WITHOUT COMPLICATIONS: ICD-10-CM

## 2023-08-12 DIAGNOSIS — I27.20 PULMONARY HYPERTENSION, UNSPECIFIED: ICD-10-CM

## 2023-08-12 DIAGNOSIS — I10 ESSENTIAL (PRIMARY) HYPERTENSION: ICD-10-CM

## 2023-08-12 DIAGNOSIS — E03.9 HYPOTHYROIDISM, UNSPECIFIED: ICD-10-CM

## 2023-08-12 DIAGNOSIS — Z29.9 ENCOUNTER FOR PROPHYLACTIC MEASURES, UNSPECIFIED: ICD-10-CM

## 2023-08-12 DIAGNOSIS — N18.9 CHRONIC KIDNEY DISEASE, UNSPECIFIED: ICD-10-CM

## 2023-08-12 DIAGNOSIS — I50.33 ACUTE ON CHRONIC DIASTOLIC (CONGESTIVE) HEART FAILURE: ICD-10-CM

## 2023-08-12 LAB
ALBUMIN SERPL ELPH-MCNC: 3.9 G/DL — SIGNIFICANT CHANGE UP (ref 3.3–5)
ALP SERPL-CCNC: 416 U/L — HIGH (ref 40–120)
ALT FLD-CCNC: 45 U/L — HIGH (ref 4–33)
ANION GAP SERPL CALC-SCNC: 11 MMOL/L — SIGNIFICANT CHANGE UP (ref 7–14)
ANION GAP SERPL CALC-SCNC: 13 MMOL/L — SIGNIFICANT CHANGE UP (ref 7–14)
APTT BLD: 39.5 SEC — HIGH (ref 24.5–35.6)
AST SERPL-CCNC: 29 U/L — SIGNIFICANT CHANGE UP (ref 4–32)
BASE EXCESS BLDV CALC-SCNC: -4.2 MMOL/L — LOW (ref -2–3)
BASOPHILS # BLD AUTO: 0.02 K/UL — SIGNIFICANT CHANGE UP (ref 0–0.2)
BASOPHILS NFR BLD AUTO: 0.3 % — SIGNIFICANT CHANGE UP (ref 0–2)
BILIRUB SERPL-MCNC: 0.5 MG/DL — SIGNIFICANT CHANGE UP (ref 0.2–1.2)
BLD GP AB SCN SERPL QL: NEGATIVE — SIGNIFICANT CHANGE UP
BLOOD GAS VENOUS COMPREHENSIVE RESULT: SIGNIFICANT CHANGE UP
BUN SERPL-MCNC: 52 MG/DL — HIGH (ref 7–23)
BUN SERPL-MCNC: 55 MG/DL — HIGH (ref 7–23)
CALCIUM SERPL-MCNC: 9.2 MG/DL — SIGNIFICANT CHANGE UP (ref 8.4–10.5)
CALCIUM SERPL-MCNC: 9.4 MG/DL — SIGNIFICANT CHANGE UP (ref 8.4–10.5)
CHLORIDE BLDV-SCNC: 103 MMOL/L — SIGNIFICANT CHANGE UP (ref 96–108)
CHLORIDE SERPL-SCNC: 100 MMOL/L — SIGNIFICANT CHANGE UP (ref 98–107)
CHLORIDE SERPL-SCNC: 99 MMOL/L — SIGNIFICANT CHANGE UP (ref 98–107)
CO2 BLDV-SCNC: 22.9 MMOL/L — SIGNIFICANT CHANGE UP (ref 22–26)
CO2 SERPL-SCNC: 20 MMOL/L — LOW (ref 22–31)
CO2 SERPL-SCNC: 22 MMOL/L — SIGNIFICANT CHANGE UP (ref 22–31)
CREAT SERPL-MCNC: 2.09 MG/DL — HIGH (ref 0.5–1.3)
CREAT SERPL-MCNC: 2.21 MG/DL — HIGH (ref 0.5–1.3)
EGFR: 25 ML/MIN/1.73M2 — LOW
EGFR: 27 ML/MIN/1.73M2 — LOW
EOSINOPHIL # BLD AUTO: 0.3 K/UL — SIGNIFICANT CHANGE UP (ref 0–0.5)
EOSINOPHIL NFR BLD AUTO: 4.4 % — SIGNIFICANT CHANGE UP (ref 0–6)
GAS PNL BLDV: 133 MMOL/L — LOW (ref 136–145)
GLUCOSE BLDV-MCNC: 86 MG/DL — SIGNIFICANT CHANGE UP (ref 70–99)
GLUCOSE SERPL-MCNC: 174 MG/DL — HIGH (ref 70–99)
GLUCOSE SERPL-MCNC: 92 MG/DL — SIGNIFICANT CHANGE UP (ref 70–99)
HCO3 BLDV-SCNC: 22 MMOL/L — SIGNIFICANT CHANGE UP (ref 22–29)
HCT VFR BLD CALC: 23.3 % — LOW (ref 34.5–45)
HCT VFR BLDA CALC: 23 % — LOW (ref 34.5–46.5)
HGB BLD CALC-MCNC: 7.8 G/DL — LOW (ref 11.7–16.1)
HGB BLD-MCNC: 7.7 G/DL — LOW (ref 11.5–15.5)
IANC: 5.15 K/UL — SIGNIFICANT CHANGE UP (ref 1.8–7.4)
IMM GRANULOCYTES NFR BLD AUTO: 0.4 % — SIGNIFICANT CHANGE UP (ref 0–0.9)
INR BLD: 1.1 RATIO — SIGNIFICANT CHANGE UP (ref 0.85–1.18)
LACTATE BLDV-MCNC: 0.5 MMOL/L — SIGNIFICANT CHANGE UP (ref 0.5–2)
LYMPHOCYTES # BLD AUTO: 0.68 K/UL — LOW (ref 1–3.3)
LYMPHOCYTES # BLD AUTO: 10 % — LOW (ref 13–44)
MAGNESIUM SERPL-MCNC: 1.6 MG/DL — SIGNIFICANT CHANGE UP (ref 1.6–2.6)
MAGNESIUM SERPL-MCNC: 2.6 MG/DL — SIGNIFICANT CHANGE UP (ref 1.6–2.6)
MCHC RBC-ENTMCNC: 26.1 PG — LOW (ref 27–34)
MCHC RBC-ENTMCNC: 33 GM/DL — SIGNIFICANT CHANGE UP (ref 32–36)
MCV RBC AUTO: 79 FL — LOW (ref 80–100)
MONOCYTES # BLD AUTO: 0.63 K/UL — SIGNIFICANT CHANGE UP (ref 0–0.9)
MONOCYTES NFR BLD AUTO: 9.3 % — SIGNIFICANT CHANGE UP (ref 2–14)
NEUTROPHILS # BLD AUTO: 5.15 K/UL — SIGNIFICANT CHANGE UP (ref 1.8–7.4)
NEUTROPHILS NFR BLD AUTO: 75.6 % — SIGNIFICANT CHANGE UP (ref 43–77)
NRBC # BLD: 0 /100 WBCS — SIGNIFICANT CHANGE UP (ref 0–0)
NRBC # FLD: 0 K/UL — SIGNIFICANT CHANGE UP (ref 0–0)
PCO2 BLDV: 42 MMHG — SIGNIFICANT CHANGE UP (ref 39–52)
PH BLDV: 7.32 — SIGNIFICANT CHANGE UP (ref 7.32–7.43)
PHOSPHATE SERPL-MCNC: 5.2 MG/DL — HIGH (ref 2.5–4.5)
PHOSPHATE SERPL-MCNC: 5.9 MG/DL — HIGH (ref 2.5–4.5)
PLATELET # BLD AUTO: 152 K/UL — SIGNIFICANT CHANGE UP (ref 150–400)
PO2 BLDV: 69 MMHG — HIGH (ref 25–45)
POTASSIUM BLDV-SCNC: 4.7 MMOL/L — SIGNIFICANT CHANGE UP (ref 3.5–5.1)
POTASSIUM SERPL-MCNC: 4.5 MMOL/L — SIGNIFICANT CHANGE UP (ref 3.5–5.3)
POTASSIUM SERPL-MCNC: 4.8 MMOL/L — SIGNIFICANT CHANGE UP (ref 3.5–5.3)
POTASSIUM SERPL-SCNC: 4.5 MMOL/L — SIGNIFICANT CHANGE UP (ref 3.5–5.3)
POTASSIUM SERPL-SCNC: 4.8 MMOL/L — SIGNIFICANT CHANGE UP (ref 3.5–5.3)
PROT SERPL-MCNC: 7.6 G/DL — SIGNIFICANT CHANGE UP (ref 6–8.3)
PROTHROM AB SERPL-ACNC: 12.3 SEC — SIGNIFICANT CHANGE UP (ref 9.5–13)
RBC # BLD: 2.95 M/UL — LOW (ref 3.8–5.2)
RBC # FLD: 13.8 % — SIGNIFICANT CHANGE UP (ref 10.3–14.5)
RH IG SCN BLD-IMP: POSITIVE — SIGNIFICANT CHANGE UP
SAO2 % BLDV: 92.7 % — HIGH (ref 67–88)
SODIUM SERPL-SCNC: 132 MMOL/L — LOW (ref 135–145)
SODIUM SERPL-SCNC: 133 MMOL/L — LOW (ref 135–145)
TROPONIN T, HIGH SENSITIVITY RESULT: 51 NG/L — HIGH
WBC # BLD: 6.81 K/UL — SIGNIFICANT CHANGE UP (ref 3.8–10.5)
WBC # FLD AUTO: 6.81 K/UL — SIGNIFICANT CHANGE UP (ref 3.8–10.5)

## 2023-08-12 PROCEDURE — 12345: CPT | Mod: NC

## 2023-08-12 PROCEDURE — 99223 1ST HOSP IP/OBS HIGH 75: CPT | Mod: GC

## 2023-08-12 RX ORDER — BUMETANIDE 0.25 MG/ML
2 INJECTION INTRAMUSCULAR; INTRAVENOUS
Refills: 0 | Status: DISCONTINUED | OUTPATIENT
Start: 2023-08-12 | End: 2023-08-13

## 2023-08-12 RX ORDER — ATORVASTATIN CALCIUM 80 MG/1
40 TABLET, FILM COATED ORAL AT BEDTIME
Refills: 0 | Status: DISCONTINUED | OUTPATIENT
Start: 2023-08-12 | End: 2023-08-30

## 2023-08-12 RX ORDER — DEXTROSE 50 % IN WATER 50 %
15 SYRINGE (ML) INTRAVENOUS ONCE
Refills: 0 | Status: DISCONTINUED | OUTPATIENT
Start: 2023-08-12 | End: 2023-08-30

## 2023-08-12 RX ORDER — AMLODIPINE BESYLATE 2.5 MG/1
5 TABLET ORAL DAILY
Refills: 0 | Status: DISCONTINUED | OUTPATIENT
Start: 2023-08-12 | End: 2023-08-13

## 2023-08-12 RX ORDER — SODIUM CHLORIDE 9 MG/ML
1000 INJECTION, SOLUTION INTRAVENOUS
Refills: 0 | Status: DISCONTINUED | OUTPATIENT
Start: 2023-08-12 | End: 2023-08-30

## 2023-08-12 RX ORDER — DEXTROSE 50 % IN WATER 50 %
25 SYRINGE (ML) INTRAVENOUS ONCE
Refills: 0 | Status: DISCONTINUED | OUTPATIENT
Start: 2023-08-12 | End: 2023-08-30

## 2023-08-12 RX ORDER — REPAGLINIDE 1 MG/1
0.5 TABLET ORAL
Refills: 0 | Status: DISCONTINUED | OUTPATIENT
Start: 2023-08-12 | End: 2023-08-12

## 2023-08-12 RX ORDER — LEVOTHYROXINE SODIUM 125 MCG
125 TABLET ORAL DAILY
Refills: 0 | Status: DISCONTINUED | OUTPATIENT
Start: 2023-08-12 | End: 2023-08-30

## 2023-08-12 RX ORDER — HYDRALAZINE HCL 50 MG
100 TABLET ORAL EVERY 8 HOURS
Refills: 0 | Status: DISCONTINUED | OUTPATIENT
Start: 2023-08-12 | End: 2023-08-30

## 2023-08-12 RX ORDER — INSULIN LISPRO 100/ML
VIAL (ML) SUBCUTANEOUS
Refills: 0 | Status: DISCONTINUED | OUTPATIENT
Start: 2023-08-12 | End: 2023-08-30

## 2023-08-12 RX ORDER — PANTOPRAZOLE SODIUM 20 MG/1
40 TABLET, DELAYED RELEASE ORAL
Refills: 0 | Status: DISCONTINUED | OUTPATIENT
Start: 2023-08-12 | End: 2023-08-30

## 2023-08-12 RX ORDER — GLUCAGON INJECTION, SOLUTION 0.5 MG/.1ML
1 INJECTION, SOLUTION SUBCUTANEOUS ONCE
Refills: 0 | Status: DISCONTINUED | OUTPATIENT
Start: 2023-08-12 | End: 2023-08-30

## 2023-08-12 RX ORDER — MAGNESIUM SULFATE 500 MG/ML
2 VIAL (ML) INJECTION ONCE
Refills: 0 | Status: COMPLETED | OUTPATIENT
Start: 2023-08-12 | End: 2023-08-12

## 2023-08-12 RX ORDER — DEXTROSE 50 % IN WATER 50 %
12.5 SYRINGE (ML) INTRAVENOUS ONCE
Refills: 0 | Status: DISCONTINUED | OUTPATIENT
Start: 2023-08-12 | End: 2023-08-30

## 2023-08-12 RX ORDER — INSULIN LISPRO 100/ML
VIAL (ML) SUBCUTANEOUS AT BEDTIME
Refills: 0 | Status: DISCONTINUED | OUTPATIENT
Start: 2023-08-12 | End: 2023-08-30

## 2023-08-12 RX ORDER — SODIUM BICARBONATE 1 MEQ/ML
1300 SYRINGE (ML) INTRAVENOUS THREE TIMES A DAY
Refills: 0 | Status: DISCONTINUED | OUTPATIENT
Start: 2023-08-12 | End: 2023-08-12

## 2023-08-12 RX ADMIN — Medication 3: at 15:46

## 2023-08-12 RX ADMIN — Medication 25 GRAM(S): at 04:43

## 2023-08-12 RX ADMIN — Medication 100 MILLIGRAM(S): at 21:00

## 2023-08-12 RX ADMIN — Medication 125 MICROGRAM(S): at 06:46

## 2023-08-12 RX ADMIN — PANTOPRAZOLE SODIUM 40 MILLIGRAM(S): 20 TABLET, DELAYED RELEASE ORAL at 06:46

## 2023-08-12 RX ADMIN — AMLODIPINE BESYLATE 5 MILLIGRAM(S): 2.5 TABLET ORAL at 06:46

## 2023-08-12 RX ADMIN — Medication 100 MILLIGRAM(S): at 15:15

## 2023-08-12 RX ADMIN — BUMETANIDE 2 MILLIGRAM(S): 0.25 INJECTION INTRAMUSCULAR; INTRAVENOUS at 08:48

## 2023-08-12 RX ADMIN — Medication 100 MILLIGRAM(S): at 06:47

## 2023-08-12 RX ADMIN — BUMETANIDE 2 MILLIGRAM(S): 0.25 INJECTION INTRAMUSCULAR; INTRAVENOUS at 15:13

## 2023-08-12 RX ADMIN — ATORVASTATIN CALCIUM 40 MILLIGRAM(S): 80 TABLET, FILM COATED ORAL at 21:00

## 2023-08-12 NOTE — H&P ADULT - NSHPSOCIALHISTORY_GEN_ALL_CORE
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  Denies tobacco, EtOH, or illicit substance use

## 2023-08-12 NOTE — H&P ADULT - NSHPREVIEWOFSYSTEMS_GEN_ALL_CORE
REVIEW OF SYSTEMS:  CONSTITUTIONAL: No weakness, fevers or chills  EYES/ENT: No visual changes;  No vertigo or throat pain   NECK: No pain or stiffness  RESPIRATORY: + Cough and shortness of breath.  CARDIOVASCULAR: No chest pain or palpitations  GASTROINTESTINAL: No abdominal or epigastric pain. No nausea, vomiting, or hematemesis; No diarrhea or constipation. No melena or hematochezia.  GENITOURINARY: No dysuria, frequency or hematuria  NEUROLOGICAL: No numbness or weakness  SKIN: No itching, rashes REVIEW OF SYSTEMS:  CONSTITUTIONAL: No weakness, fevers or chills  EYES/ENT: No visual changes;  No vertigo or throat pain   NECK: No pain or stiffness  RESPIRATORY: + Cough and shortness of breath.  CARDIOVASCULAR: No chest pain or palpitations  GASTROINTESTINAL: No abdominal or epigastric pain. No nausea, vomiting, or hematemesis; No diarrhea or constipation. No melena or hematochezia.  GENITOURINARY: No dysuria, frequency or hematuria  NEUROLOGICAL: No numbness or weakness  SKIN: No itching, +blotchy discolored rash on her body

## 2023-08-12 NOTE — H&P ADULT - PROBLEM SELECTOR PLAN 5
- Admission earlier this month for symptomatic anemia, given 1U PRBC  - Prior iron studies demonstrate elevated ferritin, consistent with anemia of chronic disease with suspected component of anemia of CKD  - Pt needs to follow up with GI per last admission  - Hgb stable on admission.

## 2023-08-12 NOTE — PROGRESS NOTE ADULT - ASSESSMENT
59 yo F w/ HTN, HLD, DM2, CKD (stage 3-4), hypothyroidism (c/b myxedema coma in past), severe pulmonary hypertension, mod-severe TR, asthma, presenting for acute shortness of breath found to be in CHF exacerbation.

## 2023-08-12 NOTE — H&P ADULT - PROBLEM SELECTOR PLAN 3
- History of myxedema coma at other institution  - Last TFTs from June wnl, will repeat  - Low concern for myxedema coma now. 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 Slightly hyponatremic to 132 but more likely due to CHF  - Continue synthroid 125

## 2023-08-12 NOTE — H&P ADULT - PROBLEM SELECTOR PLAN 1
- Appears to be ISO noncompliance with PO bumex 1mg PO BID  - BNP 6228, CXR with persistent small L pleural effusion but appears to be more hazy than CXR 8/4.  - TTE 8/6/23 with LVEF 69%, mod-sev TR, severe pulmonary HTN  - Wean BIPAP  - Diurese with Bumex 2mg BID IV for now, transition to PO  - Strict I/O, daily weight. Pt has had multiple admissions for CHF, please document dry weight upon discharge - Home regimen PO bumex 1mg PO BID  - BNP 6228, CXR with persistent small L pleural effusion but appears to be more hazy than CXR 8/4.  - TTE 8/6/23 with LVEF 69%, mod-sev TR, severe pulmonary HTN  - Wean BIPAP  - Diurese with Bumex 2mg BID IV for now, transition to PO  - Strict I/O, daily weight. Pt has had multiple admissions for CHF, please document dry weight upon discharge - Home regimen PO bumex 1mg PO BID  - BNP 6228, CXR with persistent small L pleural effusion but appears to be more hazy than CXR 8/4.  - TTE 8/6/23 with LVEF 69%, mod-sev TR, severe pulmonary HTN  - Wean BIPAP  - Diurese with Bumex 2mg BID IV for now, transition to PO  - Given the discrepancy between LF function and class 2 pulm HTN, would recommend heart failure consult in am.  - Strict I/O, daily weight. Pt has had multiple admissions for CHF, please document dry weight upon discharge - Home regimen PO bumex 1mg PO BID  - BNP 6228, CXR with persistent small L pleural effusion but appears to be more hazy than CXR 8/4.  - TTE 8/6/23 with LVEF 69%, mod-sev TR, severe pulmonary HTN  - Wean BIPAP  - Diurese with Bumex 2mg BID IV for now, transition to PO  - Given the discrepancy between LV function and class 2 pulm HTN, would recommend heart failure consult in am.  - Strict I/O, daily weight. Pt has had multiple admissions for CHF, please document dry weight upon discharge

## 2023-08-12 NOTE — H&P ADULT - PROBLEM SELECTOR PLAN 4
- Pt with CKD 3-4, baseline cr approximately 2  - Cr 2.09 on admission  - Monitor renal function/UOP, avoid nephrotoxins  - Metabolic acidosis, c/w sodium bicarb 1300 mg TID - Pt with CKD 3-4, baseline cr approximately 2  - Cr 2.09 on admission  - Monitor renal function/UOP, avoid nephrotoxins - Pt with CKD 3-4, baseline cr approximately 2  - Cr 2.09 on admission  - Monitor renal function/UOP, avoid nephrotoxins  - Hold bicarb tabs due to salt content. - Pt with CKD 3-4, baseline cr approximately 2  - Cr 2.09 on admission  - Monitor renal function/UOP, avoid nephrotoxins  - Hold bicarb tabs due to salt content, restart as needed

## 2023-08-12 NOTE — H&P ADULT - ATTENDING COMMENTS
Patient seen and examined on 8/12/23, case discussed with Dr. Nato Gomez. This is a 58F with history as above who presents to the hospital with complaints of worsening SOB here found to have likely HFpEF exacerbation. Patient with known history of HFpEF (last EF 69%) with class II pHTN though TTE without significant LV dysfunction to explain her class II pHTN. On diuresis but with frequent HFpEF exacerbations. Given frequent readmissions for ADHF would consult heart failure team and have patient establish with them to help guide her management. For now c/w IV diuresis with bumex as above, monitor I/O. Has recent TTE and patient without acute chest pain, stable. troponins, and non-ischemic EKG so low concern for ischemia as a cause of her HFpEF exacerbation, would hold off on re-doing a TTE. Continue with BiPAP and reassess WOB in AM. Other management as above.

## 2023-08-12 NOTE — PROGRESS NOTE ADULT - PROBLEM SELECTOR PLAN 3
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 Slightly hyponatremic to 132 but more likely due to CHF  - Continue synthroid 125

## 2023-08-12 NOTE — PROGRESS NOTE ADULT - PROBLEM SELECTOR PLAN 2
- Right heart cath on 1/23/2023 demonstrates PVR normal, elevated PCWP, mPAP 41, s/o group 2 PHTN, but LV function is normal on echo  - Pulm notes from january 2023 reviewed. No evidence of fibrosis on CT

## 2023-08-12 NOTE — H&P ADULT - PROBLEM SELECTOR PLAN 7
- On repaglinide at home  - ISS here  - A1c 6.3 in June. Outpatient fructosamine corresponds to A1c 7.8

## 2023-08-12 NOTE — H&P ADULT - HISTORY OF PRESENT ILLNESS
58-year-old female past medical history of anemia, asthma, CHF, CKD, DM presents emergency department for 5 days of worsening dyspnea on exertion. Patient states she was recently discharged 5 days ago for symptomatic anemia requiring blood transfusion. Patient states she has had worsening MARKS ED since then. She did not get/ her bumex apparently.    ED Course:  T98.8, HR 78 160/72 saturating 100% on RA.    Pt was dyspneic with SOB, elevated BNP, CXR hazier than previous. Given lasiv 40mg IV. Placed on bipap for comfort.    On my exam in the ED, patient resting comfortably on BIPAP 10/5 FiO2 21% RR 18 saturating 100% RA. Gave limited answers as was trying to sleep. 58-year-old female past medical history of anemia, asthma, CHF, CKD, DM presents emergency department for 5 days of worsening dyspnea on exertion. Patient states she was recently discharged 5 days ago for symptomatic anemia requiring blood transfusion. Patient states she has had worsening MARKS ED since then.    ED Course:  T98.8, HR 78 160/72 saturating 100% on RA.    Pt was dyspneic with SOB, elevated BNP, CXR hazier than previous. Given lasiv 40mg IV. Placed on bipap for comfort.    On my exam in the ED, patient resting comfortably on BIPAP 10/5 FiO2 21% RR 18 saturating 100% RA. Gave limited answers as was trying to sleep. 58-year-old female past medical history of anemia, asthma, CHF, CKD, DM presents emergency department for 5 days of worsening dyspnea on exertion. Patient states she was recently discharged 5 days ago for symptomatic anemia requiring blood transfusion. Patient states she has had worsening MARKS ED since then.    ED Course:  T98.8, HR 78 160/72 saturating 100% on RA.    Pt was dyspneic with SOB, elevated BNP, CXR hazier than previous. Given Lasix 40mg IV. Placed on bipap for comfort.    On my exam in the ED, patient resting comfortably on BIPAP 10/5 FiO2 21% RR 18 saturating 100% RA. Gave limited answers as was trying to sleep. 59 yo F w/ HTN, HLD, DM2, CKD (stage 3-4), hypothyroidism (c/b myxedema coma in past), severe pulmonary hypertension, mod-severe TR, asthma, presents emergency department for 5 days of worsening dyspnea on exertion. Patient states she was recently discharged 5 days ago for symptomatic anemia requiring blood transfusion. Patient states she has had worsening MARKS ED since then.    ED Course:  T98.8, HR 78 160/72 saturating 100% on RA.    Pt was dyspneic with SOB, elevated BNP, CXR hazier than previous. Given Lasix 40mg IV. Placed on bipap for comfort.    On my exam in the ED, patient resting comfortably on BIPAP 10/5 FiO2 21% RR 18 saturating 100% RA. Gave limited answers as was trying to sleep. 59 yo F w/ HTN, HLD, DM2, CKD (stage 3-4), hypothyroidism (c/b myxedema coma in past), severe pulmonary hypertension, mod-severe TR, asthma, presents emergency department for 5 days of worsening dyspnea on exertion. Patient states she was recently discharged 5 days ago for symptomatic anemia requiring blood transfusion with stay c/b HFpEF exacerbation due to the transfusions. Reports MARKS with limitation in exertion but no chest pain, palpitations, or syncope. Reports nasal congestion but no sore throat. Otherwise reports blotchy rash on her body that started a few months ago and has progressively worsened. No other acute complaints.     ED Course:  T98.8, HR 78 160/72 saturating 100% on RA.    Pt was dyspneic with SOB, elevated BNP, CXR hazier than previous. Given Lasix 40mg IV. Placed on bipap for comfort.    On my exam in the ED, patient resting comfortably on BIPAP 10/5 FiO2 21% RR 18 saturating 100% RA. Gave limited answers as was trying to sleep.

## 2023-08-12 NOTE — H&P ADULT - PROBLEM SELECTOR PLAN 2
- Right heart cath on 1/23/2023 demonstrates PVR normal, elevated PCWP, mPAP 41, s/o group 2 PHTN.  - Pulm notes from january 2023 reviewed. No evidence of fibrosis on CT - Right heart cath on 1/23/2023 demonstrates PVR normal, elevated PCWP, mPAP 41, s/o group 2 PHTN, but LV function is normal on echo  - Pulm notes from january 2023 reviewed. No evidence of fibrosis on CT

## 2023-08-12 NOTE — H&P ADULT - NSHPLABSRESULTS_GEN_ALL_CORE
LABS:                         8.3    8.24  )-----------( 159      ( 11 Aug 2023 19:40 )             24.9     08-12    132<L>  |  99  |  55<H>  ----------------------------<  174<H>  4.8   |  22  |  2.09<H>    Ca    9.2      12 Aug 2023 00:43  Phos  5.2     08-12  Mg     1.60     08-12    TPro  5.7<L>  /  Alb  2.9<L>  /  TBili  0.4  /  DBili  x   /  AST  32  /  ALT  40<H>  /  AlkPhos  321<H>  08-11    PT/INR - ( 11 Aug 2023 19:40 )   PT: 12.4 sec;   INR: 1.10 ratio         PTT - ( 11 Aug 2023 19:40 )  PTT:38.9 sec  Urinalysis Basic - ( 12 Aug 2023 00:43 )    Color: x / Appearance: x / SG: x / pH: x  Gluc: 174 mg/dL / Ketone: x  / Bili: x / Urobili: x   Blood: x / Protein: x / Nitrite: x   Leuk Esterase: x / RBC: x / WBC x   Sq Epi: x / Non Sq Epi: x / Bacteria: x            RADIOLOGY, EKG & ADDITIONAL TESTS:  < from: Xray Chest 2 Views PA/Lat (08.11.23 @ 19:51) >    INTERPRETATION:  Small left pleural effusion with associated atelectasis.   No focal consolidation.    < end of copied text > LABS:                         8.3    8.24  )-----------( 159      ( 11 Aug 2023 19:40 )             24.9     08-12    132<L>  |  99  |  55<H>  ----------------------------<  174<H>  4.8   |  22  |  2.09<H>    Ca    9.2      12 Aug 2023 00:43  Phos  5.2     08-12  Mg     1.60     08-12    TPro  5.7<L>  /  Alb  2.9<L>  /  TBili  0.4  /  DBili  x   /  AST  32  /  ALT  40<H>  /  AlkPhos  321<H>  08-11    PT/INR - ( 11 Aug 2023 19:40 )   PT: 12.4 sec;   INR: 1.10 ratio    PTT - ( 11 Aug 2023 19:40 )  PTT:38.9 sec    Troponin T, High Sensitivity (08.11.23 @ 19:40)   Troponin T, High Sensitivity Result: 48 ng/L  Troponin T, High Sensitivity (08.12.23 @ 06:49)   Troponin T, High Sensitivity Result: 51 ng/L    Pro-Brain Natriuretic Peptide (08.11.23 @ 19:40)   Pro-Brain Natriuretic Peptide: 6228 pg/mL    Thyroid Stimulating Hormone, Serum (08.12.23 @ 00:43)   Thyroid Stimulating Hormone, Serum: 4.82 uIU/mL  Free Thyroxine, Serum (08.12.23 @ 00:43)   Free Thyroxine, Serum: 1.9 ng/dL  Triiodothyronine, Total (T3 Total) (08.12.23 @ 00:43)   Triiodothyronine, Total (T3 Total): 56 ng/dL    RVP - negative    EKG - NSR at 75, QTc 431, TWI V2 (present on prior EKG), no significant ST-T wave changes    RADIOLOGY, EKG & ADDITIONAL TESTS:  < from: Xray Chest 2 Views PA/Lat (08.11.23 @ 19:51) >    INTERPRETATION:  Small left pleural effusion with associated atelectasis.   No focal consolidation.    < end of copied text >

## 2023-08-12 NOTE — PROGRESS NOTE ADULT - SUBJECTIVE AND OBJECTIVE BOX
[facility] - Department of Medicine   Kyra Tovar DO   Pager: [phone]    SUBJECTIVE / OVERNIGHT EVENTS: Pt seen and examined in the ED. Breathing comfortable on NC, speaking full sentences, off of BIPAP with no increased work of breathing currently.     MEDICATIONS  (STANDING):  amLODIPine   Tablet 5 milliGRAM(s) Oral daily  atorvastatin 40 milliGRAM(s) Oral at bedtime  buMETAnide Injectable 2 milliGRAM(s) IV Push <User Schedule>  dextrose 5%. 1000 milliLiter(s) (50 mL/Hr) IV Continuous <Continuous>  dextrose 5%. 1000 milliLiter(s) (100 mL/Hr) IV Continuous <Continuous>  dextrose 50% Injectable 25 Gram(s) IV Push once  dextrose 50% Injectable 25 Gram(s) IV Push once  dextrose 50% Injectable 12.5 Gram(s) IV Push once  glucagon  Injectable 1 milliGRAM(s) IntraMuscular once  hydrALAZINE 100 milliGRAM(s) Oral every 8 hours  insulin lispro (ADMELOG) corrective regimen sliding scale   SubCutaneous three times a day before meals  insulin lispro (ADMELOG) corrective regimen sliding scale   SubCutaneous at bedtime  levothyroxine 125 MICROGram(s) Oral daily  pantoprazole    Tablet 40 milliGRAM(s) Oral before breakfast    MEDICATIONS  (PRN):  dextrose Oral Gel 15 Gram(s) Oral once PRN Blood Glucose LESS THAN 70 milliGRAM(s)/deciliter      Vital Signs Last 24 Hrs  T(C): 36.9 (12 Aug 2023 07:07), Max: 36.9 (12 Aug 2023 04:00)  T(F): 98.5 (12 Aug 2023 07:07), Max: 98.5 (12 Aug 2023 04:00)  HR: 91 (12 Aug 2023 15:23) (59 - 91)  BP: 132/85 (12 Aug 2023 15:23) (132/85 - 175/84)  BP(mean): 90 (12 Aug 2023 07:07) (90 - 112)  RR: 22 (12 Aug 2023 15:23) (14 - 24)  SpO2: 100% (12 Aug 2023 15:23) (100% - 100%)    Parameters below as of 12 Aug 2023 15:23  Patient On (Oxygen Delivery Method): nasal cannula  O2 Flow (L/min): 2    CAPILLARY BLOOD GLUCOSE      POCT Blood Glucose.: 263 mg/dL (12 Aug 2023 15:28)  POCT Blood Glucose.: 107 mg/dL (12 Aug 2023 10:32)  POCT Blood Glucose.: 108 mg/dL (12 Aug 2023 07:42)      PHYSICAL EXAM:  GENERAL: NAD, well-developed  HEAD:  Atraumatic, Normocephalic  EYES: EOMI, conjunctiva and sclera clear  NECK: Supple, No JVD  CHEST/LUNG: Clear to auscultation bilaterally; No wheeze  HEART: Regular rate and rhythm; +systolic ejection murmur  ABDOMEN: Soft, Nontender, Nondistended; Bowel sounds present  EXTREMITIES:  2+ Peripheral Pulses, No clubbing, cyanosis, or edema  PSYCH: AAOx3  NEUROLOGY: non-focal  SKIN: No rashes or lesions    LABS:                        7.7    6.81  )-----------( 152      ( 12 Aug 2023 06:49 )             23.3     08-12    133<L>  |  100  |  52<H>  ----------------------------<  92  4.5   |  20<L>  |  2.21<H>    Ca    9.4      12 Aug 2023 06:49  Phos  5.9     08-12  Mg     2.60     08-12    TPro  7.6  /  Alb  3.9  /  TBili  0.5  /  DBili  x   /  AST  29  /  ALT  45<H>  /  AlkPhos  416<H>  08-12    PT/INR - ( 12 Aug 2023 06:49 )   PT: 12.3 sec;   INR: 1.10 ratio         PTT - ( 12 Aug 2023 06:49 )  PTT:39.5 sec      Urinalysis Basic - ( 12 Aug 2023 06:49 )    Color: x / Appearance: x / SG: x / pH: x  Gluc: 92 mg/dL / Ketone: x  / Bili: x / Urobili: x   Blood: x / Protein: x / Nitrite: x   Leuk Esterase: x / RBC: x / WBC x   Sq Epi: x / Non Sq Epi: x / Bacteria: x        RADIOLOGY & ADDITIONAL TESTS:

## 2023-08-12 NOTE — H&P ADULT - NSHPPHYSICALEXAM_GEN_ALL_CORE
VITALS:   T(C): 36.7 (08-12-23 @ 00:41), Max: 37.1 (08-11-23 @ 17:17)  HR: 63 (08-12-23 @ 02:01) (63 - 79)  BP: 175/84 (08-12-23 @ 02:01) (160/72 - 175/84)  RR: 14 (08-12-23 @ 02:01) (14 - 18)  SpO2: 100% (08-12-23 @ 02:01) (100% - 100%)    GENERAL: NAD, lying in bed comfortably on BIPAP  HEAD: Atraumatic, Normocephalic  EYES: EOMI, PERRLA, conjunctiva and sclera clear  NECK: No JVD  CHEST/LUNG: Clear to auscultation bilaterally; No rhonchi, crackles, or wheezing. Unlabored respirations  HEART: Regular rate and rhythm; No murmurs.  Peripheral edema: None  ABDOMEN: BSx4; Soft, nontender, nondistended  EXTREMITIES:  2+ radial pulses  NERVOUS SYSTEM: A&Ox3, no gross lateralizing deficits   SKIN: No rashes or lesions VITALS:   T(C): 36.7 (08-12-23 @ 00:41), Max: 37.1 (08-11-23 @ 17:17)  HR: 63 (08-12-23 @ 02:01) (63 - 79)  BP: 175/84 (08-12-23 @ 02:01) (160/72 - 175/84)  RR: 14 (08-12-23 @ 02:01) (14 - 18)  SpO2: 100% (08-12-23 @ 02:01) (100% - 100%)    GENERAL: NAD, lying in bed comfortably on BIPAP  HEAD: Atraumatic, Normocephalic  EYES: EOMI, PERRL, conjunctiva and sclera clear  NECK: No JVD  CHEST/LUNG: Clear to auscultation bilaterally; No rhonchi, crackles, or wheezing. Unlabored respirations  HEART: Regular rate and rhythm; +MENDEL loudest at the LUSB  Peripheral edema: None  ABDOMEN: BSx4; Soft, nontender, nondistended  EXTREMITIES:  2+ radial pulses  NERVOUS SYSTEM: A&Ox3, no gross lateralizing deficits   SKIN: Noted to have some discolored patches on her arms and legs, No lesions

## 2023-08-12 NOTE — PROGRESS NOTE ADULT - PROBLEM SELECTOR PLAN 1
- Home regimen PO bumex 1mg PO BID  - BNP 6228, CXR with persistent small L pleural effusion but appears to be more hazy than CXR 8/4.  - TTE 8/6/23 with LVEF 69%, mod-sev TR, severe pulmonary HTN  - Weaned off BIPAP -> now on O2  - Diurese with Bumex 2mg BID IV for now  - Given the discrepancy between LV function and class 2 pulm HTN, will consult cardiology   - Strict I/O, daily weight. Pt has had multiple admissions for CHF, please document dry weight upon discharge

## 2023-08-12 NOTE — H&P ADULT - ASSESSMENT
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 59 yo F w/ HTN, HLD, DM2, CKD (stage 3-4), hypothyroidism (c/b myxedema coma in past), severe pulmonary hypertension, mod-severe TR, asthma, presenting for acute shortness of breath found to be in CHF exacerbation.

## 2023-08-12 NOTE — ED ADULT NURSE REASSESSMENT NOTE - NS ED NURSE REASSESS COMMENT FT1
Pt remains stable. Pt taken off of BiPap. 6 liters placed via NC. Will continue to monitor respiratory status.
Pt currently resting comfortably in bed. All meds given as ordered. Continuous cardiac monitoring in progress. Safety maintained.
patient observed resting in bed. no indication of pain or discomfort. vs reassessed, vs wnl. patient medicated as ordered  fall and safety precautions maintained. patient still on bipap, tolerated well, patient requesting food at this time
patient reassessed, patient observed resting in bed. no indication of pain or discomfort. vs reassessed, vs wnl. fall and safety precautions maintained. patient still on BiPAP, tolerated well, spo2 wnl.
patient received from previous RN. patient observed resting in bed. patient is axo4, no indication of pain or discomfort. no active distress noted. vs reassessed, vs wnl. patient on bipap, tolerated well at this time, spo2 wnl. fall and safety precautions maintained. plan of care reviewed with patient, patient is pending inpatient admission

## 2023-08-13 DIAGNOSIS — N17.9 ACUTE KIDNEY FAILURE, UNSPECIFIED: ICD-10-CM

## 2023-08-13 DIAGNOSIS — E87.5 HYPERKALEMIA: ICD-10-CM

## 2023-08-13 LAB
ANION GAP SERPL CALC-SCNC: 14 MMOL/L — SIGNIFICANT CHANGE UP (ref 7–14)
ANION GAP SERPL CALC-SCNC: 14 MMOL/L — SIGNIFICANT CHANGE UP (ref 7–14)
BUN SERPL-MCNC: 62 MG/DL — HIGH (ref 7–23)
BUN SERPL-MCNC: 62 MG/DL — HIGH (ref 7–23)
CALCIUM SERPL-MCNC: 8.8 MG/DL — SIGNIFICANT CHANGE UP (ref 8.4–10.5)
CALCIUM SERPL-MCNC: 8.9 MG/DL — SIGNIFICANT CHANGE UP (ref 8.4–10.5)
CHLORIDE SERPL-SCNC: 96 MMOL/L — LOW (ref 98–107)
CHLORIDE SERPL-SCNC: 97 MMOL/L — LOW (ref 98–107)
CO2 SERPL-SCNC: 20 MMOL/L — LOW (ref 22–31)
CO2 SERPL-SCNC: 21 MMOL/L — LOW (ref 22–31)
CREAT SERPL-MCNC: 2.46 MG/DL — HIGH (ref 0.5–1.3)
CREAT SERPL-MCNC: 2.47 MG/DL — HIGH (ref 0.5–1.3)
EGFR: 22 ML/MIN/1.73M2 — LOW
EGFR: 22 ML/MIN/1.73M2 — LOW
GLUCOSE SERPL-MCNC: 176 MG/DL — HIGH (ref 70–99)
GLUCOSE SERPL-MCNC: 201 MG/DL — HIGH (ref 70–99)
HCT VFR BLD CALC: 22.4 % — LOW (ref 34.5–45)
HGB BLD-MCNC: 7.5 G/DL — LOW (ref 11.5–15.5)
MAGNESIUM SERPL-MCNC: 2.1 MG/DL — SIGNIFICANT CHANGE UP (ref 1.6–2.6)
MAGNESIUM SERPL-MCNC: 2.2 MG/DL — SIGNIFICANT CHANGE UP (ref 1.6–2.6)
MCHC RBC-ENTMCNC: 26.5 PG — LOW (ref 27–34)
MCHC RBC-ENTMCNC: 33.5 GM/DL — SIGNIFICANT CHANGE UP (ref 32–36)
MCV RBC AUTO: 79.2 FL — LOW (ref 80–100)
NRBC # BLD: 0 /100 WBCS — SIGNIFICANT CHANGE UP (ref 0–0)
NRBC # FLD: 0 K/UL — SIGNIFICANT CHANGE UP (ref 0–0)
PHOSPHATE SERPL-MCNC: 6 MG/DL — HIGH (ref 2.5–4.5)
PHOSPHATE SERPL-MCNC: 6.2 MG/DL — HIGH (ref 2.5–4.5)
PLATELET # BLD AUTO: 166 K/UL — SIGNIFICANT CHANGE UP (ref 150–400)
POTASSIUM SERPL-MCNC: 4.9 MMOL/L — SIGNIFICANT CHANGE UP (ref 3.5–5.3)
POTASSIUM SERPL-MCNC: 5.6 MMOL/L — HIGH (ref 3.5–5.3)
POTASSIUM SERPL-SCNC: 4.9 MMOL/L — SIGNIFICANT CHANGE UP (ref 3.5–5.3)
POTASSIUM SERPL-SCNC: 5.6 MMOL/L — HIGH (ref 3.5–5.3)
RBC # BLD: 2.83 M/UL — LOW (ref 3.8–5.2)
RBC # FLD: 13.7 % — SIGNIFICANT CHANGE UP (ref 10.3–14.5)
SODIUM SERPL-SCNC: 131 MMOL/L — LOW (ref 135–145)
SODIUM SERPL-SCNC: 131 MMOL/L — LOW (ref 135–145)
WBC # BLD: 7.74 K/UL — SIGNIFICANT CHANGE UP (ref 3.8–10.5)
WBC # FLD AUTO: 7.74 K/UL — SIGNIFICANT CHANGE UP (ref 3.8–10.5)

## 2023-08-13 PROCEDURE — 99222 1ST HOSP IP/OBS MODERATE 55: CPT | Mod: GC

## 2023-08-13 PROCEDURE — 99233 SBSQ HOSP IP/OBS HIGH 50: CPT

## 2023-08-13 RX ORDER — HEPARIN SODIUM 5000 [USP'U]/ML
5000 INJECTION INTRAVENOUS; SUBCUTANEOUS EVERY 12 HOURS
Refills: 0 | Status: DISCONTINUED | OUTPATIENT
Start: 2023-08-13 | End: 2023-08-30

## 2023-08-13 RX ORDER — AMLODIPINE BESYLATE 2.5 MG/1
5 TABLET ORAL DAILY
Refills: 0 | Status: DISCONTINUED | OUTPATIENT
Start: 2023-08-14 | End: 2023-08-19

## 2023-08-13 RX ORDER — SODIUM ZIRCONIUM CYCLOSILICATE 10 G/10G
10 POWDER, FOR SUSPENSION ORAL ONCE
Refills: 0 | Status: COMPLETED | OUTPATIENT
Start: 2023-08-13 | End: 2023-08-13

## 2023-08-13 RX ORDER — SODIUM BICARBONATE 1 MEQ/ML
1300 SYRINGE (ML) INTRAVENOUS THREE TIMES A DAY
Refills: 0 | Status: DISCONTINUED | OUTPATIENT
Start: 2023-08-13 | End: 2023-08-27

## 2023-08-13 RX ADMIN — Medication 2: at 13:45

## 2023-08-13 RX ADMIN — Medication 1300 MILLIGRAM(S): at 22:29

## 2023-08-13 RX ADMIN — Medication 100 MILLIGRAM(S): at 05:05

## 2023-08-13 RX ADMIN — HEPARIN SODIUM 5000 UNIT(S): 5000 INJECTION INTRAVENOUS; SUBCUTANEOUS at 18:19

## 2023-08-13 RX ADMIN — Medication 2: at 18:19

## 2023-08-13 RX ADMIN — Medication 100 MILLIGRAM(S): at 22:30

## 2023-08-13 RX ADMIN — ATORVASTATIN CALCIUM 40 MILLIGRAM(S): 80 TABLET, FILM COATED ORAL at 22:29

## 2023-08-13 RX ADMIN — Medication 1300 MILLIGRAM(S): at 14:24

## 2023-08-13 RX ADMIN — BUMETANIDE 2 MILLIGRAM(S): 0.25 INJECTION INTRAMUSCULAR; INTRAVENOUS at 09:50

## 2023-08-13 RX ADMIN — Medication 100 MILLIGRAM(S): at 14:22

## 2023-08-13 RX ADMIN — PANTOPRAZOLE SODIUM 40 MILLIGRAM(S): 20 TABLET, DELAYED RELEASE ORAL at 05:05

## 2023-08-13 RX ADMIN — Medication 1: at 09:37

## 2023-08-13 RX ADMIN — SODIUM ZIRCONIUM CYCLOSILICATE 10 GRAM(S): 10 POWDER, FOR SUSPENSION ORAL at 14:47

## 2023-08-13 RX ADMIN — Medication 125 MICROGRAM(S): at 05:05

## 2023-08-13 RX ADMIN — AMLODIPINE BESYLATE 5 MILLIGRAM(S): 2.5 TABLET ORAL at 05:05

## 2023-08-13 NOTE — CONSULT NOTE ADULT - PROBLEM SELECTOR RECOMMENDATION 2
Patient with elevated potassium today of 5.5. On admission, K was normal at 4.3. Reviewed medications, unlikely medication induced. Please treat with insulin/dextrose and lokelma 10g. Monitor serum potassium. Ensure low potassium/renal diet.      Please do not stop her home sodium bicarb tabs as they are needed for long term kidney protection in patients with CKD, target serum bicarb>22. Sodium bicarb tabs can also help with potassium control.    If you have any questions, please feel free to contact me  Frederic Villareal  Nephrology Fellow  [phone]; Prefer [employer] TEAMS  (After 5pm or on weekends please page the on-call fellow).

## 2023-08-13 NOTE — PATIENT PROFILE ADULT - FUNCTIONAL ASSESSMENT - DAILY ACTIVITY ASSESSMENT TYPE
SEVERITY:- NORMAL ECG -

-------------------- PEDIATRIC ECG INTERPRETATION --------------------

SINUS RHYTHM

:

Confirmed by: Edson Nixon MD 10-Martin-2019 11:42:38 Admission

## 2023-08-13 NOTE — CONSULT NOTE ADULT - SUBJECTIVE AND OBJECTIVE BOX
[facility] DIVISION OF KIDNEY DISEASES AND HYPERTENSION -- [phone]  -- INITIAL CONSULT NOTE  --------------------------------------------------------------------------------  HPI:  58 y.o F w/ PMhx of HTN, DM, CKD (baseline Cr 2), hypothyroidism, pulmonary HTN here with SOB and found to be in decompensated heart failure exacerbation. Patient was recently discharged on 8/7 after she had an admission for a similar reason and improved with IV diuresis. Nephrology consulted for rising serum creatinine, Cr of 2.47. Follows with Dr. Jonna mackenzie.     Seen at bedside with the cardiology fellow. Patient reports feeling very short of breath and being unable to lay down in bed. She felt like she was choking when laying flat and also noted her abdomen to be more distended than usual. Also reported dyspnea on exertion. No problems with urination at home and endorses compliance with home meds. She has no SOB at rest or with exertion at this time, no LE swelling, or abdominal distention.       PAST HISTORY  --------------------------------------------------------------------------------  PAST MEDICAL & SURGICAL HISTORY:  HTN (hypertension)      HLD (hyperlipidemia)      DM (diabetes mellitus)      Hypothyroid      H/O pulmonary hypertension      CKD (chronic kidney disease)      S/P cataract surgery        FAMILY HISTORY:  FH: stroke (Father)      PAST SOCIAL HISTORY:    ALLERGIES & MEDICATIONS  --------------------------------------------------------------------------------  Allergies    No Known Allergies    Intolerances      Standing Inpatient Medications  amLODIPine   Tablet 5 milliGRAM(s) Oral daily  atorvastatin 40 milliGRAM(s) Oral at bedtime  dextrose 5%. 1000 milliLiter(s) IV Continuous <Continuous>  dextrose 5%. 1000 milliLiter(s) IV Continuous <Continuous>  dextrose 50% Injectable 25 Gram(s) IV Push once  dextrose 50% Injectable 25 Gram(s) IV Push once  dextrose 50% Injectable 12.5 Gram(s) IV Push once  glucagon  Injectable 1 milliGRAM(s) IntraMuscular once  hydrALAZINE 100 milliGRAM(s) Oral every 8 hours  insulin lispro (ADMELOG) corrective regimen sliding scale   SubCutaneous three times a day before meals  insulin lispro (ADMELOG) corrective regimen sliding scale   SubCutaneous at bedtime  levothyroxine 125 MICROGram(s) Oral daily  pantoprazole    Tablet 40 milliGRAM(s) Oral before breakfast  sodium zirconium cyclosilicate 10 Gram(s) Oral once    PRN Inpatient Medications  dextrose Oral Gel 15 Gram(s) Oral once PRN      REVIEW OF SYSTEMS  --------------------------------------------------------------------------------  per above     VITALS/PHYSICAL EXAM  --------------------------------------------------------------------------------  T(C): 36.7 (08-13-23 @ 05:00), Max: 36.7 (08-12-23 @ 19:15)  HR: 72 (08-13-23 @ 05:00) (63 - 91)  BP: 144/63 (08-13-23 @ 05:00) (132/85 - 161/76)  RR: 18 (08-13-23 @ 05:00) (18 - 24)  SpO2: 100% (08-13-23 @ 05:00) (97% - 100%)  Wt(kg): --  Height (cm): 157.5 (08-11-23 @ 17:17)  Weight (kg): 55.8 (08-12-23 @ 21:10)  BMI (kg/m2): 22.5 (08-12-23 @ 21:10)  BSA (m2): 1.55 (08-12-23 @ 21:10)      08-13-23 @ 07:01  -  08-13-23 @ 12:34  --------------------------------------------------------  IN: 0 mL / OUT: 400 mL / NET: -400 mL        Physical Exam:  	Gen: NAD; sitting up on the side of bed  	HEENT: Anicteric  	Pulm: right lower lung with crackles; clear left lung  	CV: S1S2+  	Abd: Soft, +BS    	Ext: No LE edema B/L  	Neuro: Awake  	Skin: Warm and dry  	Dialysis access:     LABS/STUDIES  --------------------------------------------------------------------------------              7.5    7.74  >-----------<  166      [08-13-23 @ 06:31]              22.4     131  |  97  |  62  ----------------------------<  176      [08-13-23 @ 06:31]  5.6   |  20  |  2.47        Ca     8.8     [08-13-23 @ 06:31]      Mg     2.20     [08-13-23 @ 06:31]      Phos  6.2     [08-13-23 @ 06:31]    TPro  7.6  /  Alb  3.9  /  TBili  0.5  /  DBili  x   /  AST  29  /  ALT  45  /  AlkPhos  416  [08-12-23 @ 06:49]    PT/INR: PT 12.3 , INR 1.10       [08-12-23 @ 06:49]  PTT: 39.5       [08-12-23 @ 06:49]      Creatinine Trend:  SCr 2.47 [08-13 @ 06:31]  SCr 2.21 [08-12 @ 06:49]  SCr 2.09 [08-12 @ 00:43]  SCr 1.62 [08-11 @ 23:00]  SCr 2.19 [08-11 @ 19:40]    Urinalysis - [08-13-23 @ 06:31]      Color  / Appearance  / SG  / pH       Gluc 176 / Ketone   / Bili  / Urobili        Blood  / Protein  / Leuk Est  / Nitrite       RBC  / WBC  / Hyaline  / Gran  / Sq Epi  / Non Sq Epi  / Bacteria       HBsAg Nonreact      [02-26-23 @ 05:32]  HCV 0.11, Nonreact      [02-26-23 @ 05:32]  HIV Nonreact      [01-20-23 @ 19:51]    STEVO: titer Negative, pattern --      [01-19-23 @ 05:02]  dsDNA <12      [01-19-23 @ 05:02]  C3 Complement 128      [01-19-23 @ 05:02]  C4 Complement 46      [01-19-23 @ 05:02]  Rheumatoid Factor 10      [01-19-23 @ 05:02]  ANCA: cANCA Negative, pANCA Negative, atypical ANCA Indeterminate Method interference due to STEVO Fluorescence      [01-19-23 @ 05:02]  anti-GBM <0.2      [01-19-23 @ 05:02]  Free Light Chains: kappa 8.92, lambda 3.79, ratio = 2.35      [01-19 @ 05:02]  Immunofixation Serum:   No Monoclonal Band Identified. HUMPHREY Mccrary MD    Reference Range: None Detected      [01-19-23 @ 05:02]  SPEP Interpretation: Increased Beta fraction, possibly transferrin increase. HUMPHREY Mccrary MD      [01-19-23 @ 05:02]  Immunofixation Urine:   Reference Range: None Detected      [01-19-23 @ 08:10]   [facility] DIVISION OF KIDNEY DISEASES AND HYPERTENSION -- [phone]  -- INITIAL CONSULT NOTE  --------------------------------------------------------------------------------  HPI:  58 y.o F w/ PMhx of HTN, DM, CKD (baseline Cr 2), hypothyroidism, pulmonary HTN here with SOB and found to be in decompensated heart failure exacerbation. Patient was recently discharged on 8/7 after she had an admission for a similar reason and improved with IV diuresis. Nephrology consulted for rising serum creatinine, Cr of 2.47. CXR with diffuse haziness, worse than before and BNP of ~6K vs 5K on 8/5. Initially needed bipap, now off.  Follows with Dr. Jonna mackenzie.     Seen at bedside with the cardiology fellow. Patient reports feeling very short of breath and being unable to lay down in bed. She felt like she was choking when laying flat and also noted her abdomen to be more distended than usual. Also reported dyspnea on exertion. No problems with urination at home and endorses compliance with home meds. She has no SOB at rest or with exertion at this time, no LE swelling, or abdominal distention.       PAST HISTORY  --------------------------------------------------------------------------------  PAST MEDICAL & SURGICAL HISTORY:  HTN (hypertension)      HLD (hyperlipidemia)      DM (diabetes mellitus)      Hypothyroid      H/O pulmonary hypertension      CKD (chronic kidney disease)      S/P cataract surgery        FAMILY HISTORY:  FH: stroke (Father)      PAST SOCIAL HISTORY:    ALLERGIES & MEDICATIONS  --------------------------------------------------------------------------------  Allergies    No Known Allergies    Intolerances      Standing Inpatient Medications  amLODIPine   Tablet 5 milliGRAM(s) Oral daily  atorvastatin 40 milliGRAM(s) Oral at bedtime  dextrose 5%. 1000 milliLiter(s) IV Continuous <Continuous>  dextrose 5%. 1000 milliLiter(s) IV Continuous <Continuous>  dextrose 50% Injectable 25 Gram(s) IV Push once  dextrose 50% Injectable 25 Gram(s) IV Push once  dextrose 50% Injectable 12.5 Gram(s) IV Push once  glucagon  Injectable 1 milliGRAM(s) IntraMuscular once  hydrALAZINE 100 milliGRAM(s) Oral every 8 hours  insulin lispro (ADMELOG) corrective regimen sliding scale   SubCutaneous three times a day before meals  insulin lispro (ADMELOG) corrective regimen sliding scale   SubCutaneous at bedtime  levothyroxine 125 MICROGram(s) Oral daily  pantoprazole    Tablet 40 milliGRAM(s) Oral before breakfast  sodium zirconium cyclosilicate 10 Gram(s) Oral once    PRN Inpatient Medications  dextrose Oral Gel 15 Gram(s) Oral once PRN      REVIEW OF SYSTEMS  --------------------------------------------------------------------------------  per above     VITALS/PHYSICAL EXAM  --------------------------------------------------------------------------------  T(C): 36.7 (08-13-23 @ 05:00), Max: 36.7 (08-12-23 @ 19:15)  HR: 72 (08-13-23 @ 05:00) (63 - 91)  BP: 144/63 (08-13-23 @ 05:00) (132/85 - 161/76)  RR: 18 (08-13-23 @ 05:00) (18 - 24)  SpO2: 100% (08-13-23 @ 05:00) (97% - 100%)  Wt(kg): --  Height (cm): 157.5 (08-11-23 @ 17:17)  Weight (kg): 55.8 (08-12-23 @ 21:10)  BMI (kg/m2): 22.5 (08-12-23 @ 21:10)  BSA (m2): 1.55 (08-12-23 @ 21:10)      08-13-23 @ 07:01  -  08-13-23 @ 12:34  --------------------------------------------------------  IN: 0 mL / OUT: 400 mL / NET: -400 mL        Physical Exam:  	Gen: NAD; sitting up on the side of bed  	HEENT: Anicteric  	Pulm: right lower lung with crackles; clear left lung  	CV: S1S2+  	Abd: Soft, +BS    	Ext: No LE edema B/L  	Neuro: Awake  	Skin: Warm and dry  	Dialysis access:     LABS/STUDIES  --------------------------------------------------------------------------------              7.5    7.74  >-----------<  166      [08-13-23 @ 06:31]              22.4     131  |  97  |  62  ----------------------------<  176      [08-13-23 @ 06:31]  5.6   |  20  |  2.47        Ca     8.8     [08-13-23 @ 06:31]      Mg     2.20     [08-13-23 @ 06:31]      Phos  6.2     [08-13-23 @ 06:31]    TPro  7.6  /  Alb  3.9  /  TBili  0.5  /  DBili  x   /  AST  29  /  ALT  45  /  AlkPhos  416  [08-12-23 @ 06:49]    PT/INR: PT 12.3 , INR 1.10       [08-12-23 @ 06:49]  PTT: 39.5       [08-12-23 @ 06:49]      Creatinine Trend:  SCr 2.47 [08-13 @ 06:31]  SCr 2.21 [08-12 @ 06:49]  SCr 2.09 [08-12 @ 00:43]  SCr 1.62 [08-11 @ 23:00]  SCr 2.19 [08-11 @ 19:40]    Urinalysis - [08-13-23 @ 06:31]      Color  / Appearance  / SG  / pH       Gluc 176 / Ketone   / Bili  / Urobili        Blood  / Protein  / Leuk Est  / Nitrite       RBC  / WBC  / Hyaline  / Gran  / Sq Epi  / Non Sq Epi  / Bacteria       HBsAg Nonreact      [02-26-23 @ 05:32]  HCV 0.11, Nonreact      [02-26-23 @ 05:32]  HIV Nonreact      [01-20-23 @ 19:51]    STEVO: titer Negative, pattern --      [01-19-23 @ 05:02]  dsDNA <12      [01-19-23 @ 05:02]  C3 Complement 128      [01-19-23 @ 05:02]  C4 Complement 46      [01-19-23 @ 05:02]  Rheumatoid Factor 10      [01-19-23 @ 05:02]  ANCA: cANCA Negative, pANCA Negative, atypical ANCA Indeterminate Method interference due to SETVO Fluorescence      [01-19-23 @ 05:02]  anti-GBM <0.2      [01-19-23 @ 05:02]  Free Light Chains: kappa 8.92, lambda 3.79, ratio = 2.35      [01-19 @ 05:02]  Immunofixation Serum:   No Monoclonal Band Identified. HUMPHREY Mccrary MD    Reference Range: None Detected      [01-19-23 @ 05:02]  SPEP Interpretation: Increased Beta fraction, possibly transferrin increase. HUMPHREY Mccrary MD      [01-19-23 @ 05:02]  Immunofixation Urine:   Reference Range: None Detected      [01-19-23 @ 08:10]   [facility] DIVISION OF KIDNEY DISEASES AND HYPERTENSION -- [phone]  -- INITIAL CONSULT NOTE  --------------------------------------------------------------------------------  HPI: 58 y.o F w/ PMhx of HTN, DM, CKD (baseline Cr 2), hypothyroidism, pulmonary HTN here with SOB and found to be in decompensated heart failure exacerbation. Patient was recently discharged on 8/7 after she had an admission for a similar reason and improved with IV diuresis. Nephrology consulted for rising serum creatinine, Cr of 2.47. CXR with diffuse haziness, worse than before and BNP of ~6K vs 5K on 8/5. Initially needed bipap, now off.  Follows with Dr. Jonna mackenzie.     Seen at bedside with the cardiology fellow. Patient reports feeling very short of breath and being unable to lay down in bed. She felt like she was choking when laying flat and also noted her abdomen to be more distended than usual. Also reported dyspnea on exertion. No problems with urination at home and endorses compliance with home meds. She has no SOB at rest or with exertion at this time, no LE swelling, or abdominal distention.     PAST HISTORY  --------------------------------------------------------------------------------  PAST MEDICAL & SURGICAL HISTORY:  HTN (hypertension)      HLD (hyperlipidemia)      DM (diabetes mellitus)      Hypothyroid      H/O pulmonary hypertension      CKD (chronic kidney disease)      S/P cataract surgery        FAMILY HISTORY:  FH: stroke (Father)      PAST SOCIAL HISTORY:  non contributory    ALLERGIES & MEDICATIONS  --------------------------------------------------------------------------------  Allergies    No Known Allergies    Intolerances      Standing Inpatient Medications  amLODIPine   Tablet 5 milliGRAM(s) Oral daily  atorvastatin 40 milliGRAM(s) Oral at bedtime  dextrose 5%. 1000 milliLiter(s) IV Continuous <Continuous>  dextrose 5%. 1000 milliLiter(s) IV Continuous <Continuous>  dextrose 50% Injectable 25 Gram(s) IV Push once  dextrose 50% Injectable 25 Gram(s) IV Push once  dextrose 50% Injectable 12.5 Gram(s) IV Push once  glucagon  Injectable 1 milliGRAM(s) IntraMuscular once  hydrALAZINE 100 milliGRAM(s) Oral every 8 hours  insulin lispro (ADMELOG) corrective regimen sliding scale   SubCutaneous three times a day before meals  insulin lispro (ADMELOG) corrective regimen sliding scale   SubCutaneous at bedtime  levothyroxine 125 MICROGram(s) Oral daily  pantoprazole    Tablet 40 milliGRAM(s) Oral before breakfast  sodium zirconium cyclosilicate 10 Gram(s) Oral once    PRN Inpatient Medications  dextrose Oral Gel 15 Gram(s) Oral once PRN      REVIEW OF SYSTEMS  --------------------------------------------------------------------------------  per above     VITALS/PHYSICAL EXAM  --------------------------------------------------------------------------------  T(C): 36.7 (08-13-23 @ 05:00), Max: 36.7 (08-12-23 @ 19:15)  HR: 72 (08-13-23 @ 05:00) (63 - 91)  BP: 144/63 (08-13-23 @ 05:00) (132/85 - 161/76)  RR: 18 (08-13-23 @ 05:00) (18 - 24)  SpO2: 100% (08-13-23 @ 05:00) (97% - 100%)  Wt(kg): --  Height (cm): 157.5 (08-11-23 @ 17:17)  Weight (kg): 55.8 (08-12-23 @ 21:10)  BMI (kg/m2): 22.5 (08-12-23 @ 21:10)  BSA (m2): 1.55 (08-12-23 @ 21:10)      08-13-23 @ 07:01  -  08-13-23 @ 12:34  --------------------------------------------------------  IN: 0 mL / OUT: 400 mL / NET: -400 mL        Physical Exam:  	Gen: NAD; sitting up on the side of bed  	HEENT: Anicteric  	Pulm: right lower lung with crackles; clear left lung  	CV: S1S2+  	Abd: Soft, +BS    	Ext: No LE edema B/L  	Neuro: Awake  	Skin: Warm and dry  	Dialysis access:     LABS/STUDIES  --------------------------------------------------------------------------------              7.5    7.74  >-----------<  166      [08-13-23 @ 06:31]              22.4     131  |  97  |  62  ----------------------------<  176      [08-13-23 @ 06:31]  5.6   |  20  |  2.47        Ca     8.8     [08-13-23 @ 06:31]      Mg     2.20     [08-13-23 @ 06:31]      Phos  6.2     [08-13-23 @ 06:31]    TPro  7.6  /  Alb  3.9  /  TBili  0.5  /  DBili  x   /  AST  29  /  ALT  45  /  AlkPhos  416  [08-12-23 @ 06:49]    PT/INR: PT 12.3 , INR 1.10       [08-12-23 @ 06:49]  PTT: 39.5       [08-12-23 @ 06:49]    Creatinine Trend:  SCr 2.47 [08-13 @ 06:31]  SCr 2.21 [08-12 @ 06:49]  SCr 2.09 [08-12 @ 00:43]  SCr 1.62 [08-11 @ 23:00]  SCr 2.19 [08-11 @ 19:40]    Urinalysis - [08-13-23 @ 06:31]      Color  / Appearance  / SG  / pH       Gluc 176 / Ketone   / Bili  / Urobili        Blood  / Protein  / Leuk Est  / Nitrite       RBC  / WBC  / Hyaline  / Gran  / Sq Epi  / Non Sq Epi  / Bacteria       HBsAg Nonreact      [02-26-23 @ 05:32]  HCV 0.11, Nonreact      [02-26-23 @ 05:32]  HIV Nonreact      [01-20-23 @ 19:51]    STEVO: titer Negative, pattern --      [01-19-23 @ 05:02]  dsDNA <12      [01-19-23 @ 05:02]  C3 Complement 128      [01-19-23 @ 05:02]  C4 Complement 46      [01-19-23 @ 05:02]  Rheumatoid Factor 10      [01-19-23 @ 05:02]  ANCA: cANCA Negative, pANCA Negative, atypical ANCA Indeterminate Method interference due to STEVO Fluorescence      [01-19-23 @ 05:02]  anti-GBM <0.2      [01-19-23 @ 05:02]  Free Light Chains: kappa 8.92, lambda 3.79, ratio = 2.35      [01-19 @ 05:02]  Immunofixation Serum:   No Monoclonal Band Identified. HUMPHREY Mccrary MD    Reference Range: None Detected      [01-19-23 @ 05:02]  SPEP Interpretation: Increased Beta fraction, possibly transferrin increase. HUMPHREY Mccrary MD      [01-19-23 @ 05:02]  Immunofixation Urine:   Reference Range: None Detected      [01-19-23 @ 08:10]

## 2023-08-13 NOTE — PATIENT PROFILE ADULT - FALL HARM RISK - HARM RISK INTERVENTIONS

## 2023-08-13 NOTE — PROGRESS NOTE ADULT - PROBLEM SELECTOR PLAN 1
- Home regimen PO bumex 1mg PO BID  - BNP 6228, CXR with persistent small L pleural effusion but appears to be more hazy than CXR 8/4.  - TTE 8/6/23 with LVEF 69%, mod-sev TR, severe pulmonary HTN  - Weaned off BIPAP -> now on O2  - discontinued Bumex 2mg BID IV for now, will likely restart PO on discharge, pending cardio/renal recs   - Given the discrepancy between LV function and class 2 pulm HTN, consulted cardio  - Strict I/O, daily weight. Pt has had multiple admissions for CHF, please document dry weight upon discharge

## 2023-08-13 NOTE — CONSULT NOTE ADULT - SUBJECTIVE AND OBJECTIVE BOX
CARDIOLOGY FELLOW CONSULT NOTE    HPI:  59 yo F w/ HTN, HLD, DM2, CKD (stage 3-4), hypothyroidism (c/b myxedema coma in past), severe pulmonary hypertension, mod-severe TR, asthma, presents emergency department for 5 days of worsening dyspnea on exertion.     Patient states she was recently discharged 5 days ago for symptomatic anemia requiring blood transfusion with stay c/b HFpEF exacerbation due to the transfusions. Case d/w GI who recommended outpatient follow up for workup of anemia. Pt is due for repeat colonoscopy.  Reports MARKS with limitation in exertion but no chest pain, palpitations, or syncope. Reports nasal congestion but no sore throat. Otherwise reports blotchy rash on her body that started a few months ago and has progressively worsened. No other acute complaints.     ED Course:  T98.8, HR 78 160/72 saturating 100% on RA.  Pt was dyspneic with SOB, elevated BNP, CXR hazier than previous. Given Lasix 40mg IV. Placed on bipap for comfort.  On my exam in the ED, patient resting comfortably on BIPAP 10/5 FiO2 21% RR 18 saturating 100% RA. Gave limited answers as was trying to sleep.    PMHx:   Benign essential HTN  HTN (hypertension)  HLD (hyperlipidemia)  DM (diabetes mellitus)  Hypothyroid  H/O pulmonary hypertension  CKD (chronic kidney disease)    PSHx:   No significant past surgical history  S/P cataract surgery    Allergies:  No Known Allergies    Home Meds:    Current Medications:   amLODIPine   Tablet 5 milliGRAM(s) Oral daily  atorvastatin 40 milliGRAM(s) Oral at bedtime  dextrose 5%. 1000 milliLiter(s) IV Continuous <Continuous>  dextrose 5%. 1000 milliLiter(s) IV Continuous <Continuous>  dextrose 50% Injectable 25 Gram(s) IV Push once  dextrose 50% Injectable 25 Gram(s) IV Push once  dextrose 50% Injectable 12.5 Gram(s) IV Push once  dextrose Oral Gel 15 Gram(s) Oral once PRN  glucagon  Injectable 1 milliGRAM(s) IntraMuscular once  hydrALAZINE 100 milliGRAM(s) Oral every 8 hours  insulin lispro (ADMELOG) corrective regimen sliding scale   SubCutaneous three times a day before meals  insulin lispro (ADMELOG) corrective regimen sliding scale   SubCutaneous at bedtime  levothyroxine 125 MICROGram(s) Oral daily  pantoprazole    Tablet 40 milliGRAM(s) Oral before breakfast  sodium zirconium cyclosilicate 10 Gram(s) Oral once    FAMILY HISTORY:  FH: stroke (Father)    Social History:  Smoking History:  Alcohol Use:  Drug Use:    REVIEW OF SYSTEMS:  CONSTITUTIONAL: No weakness, fevers or chills  EYES/ENT: No visual changes;  No dysphagia  NECK: No pain or stiffness  RESPIRATORY: No cough, wheezing, hemoptysis; No shortness of breath  CARDIOVASCULAR: No chest pain or palpitations; No lower extremity edema  GASTROINTESTINAL: No abdominal or epigastric pain. No nausea, vomiting, or hematemesis; No diarrhea or constipation. No melena or hematochezia.  BACK: No back pain  GENITOURINARY: No dysuria, frequency or hematuria  NEUROLOGICAL: No numbness or weakness  SKIN: No itching, burning, rashes, or lesions   All other review of systems is negative unless indicated above.    Physical Exam:  T(F): 98 (08-13), Max: 98.1 (08-13)  HR: 72 (08-13) (63 - 91)  BP: 144/63 (08-13) (132/85 - 161/76)  RR: 18 (08-13)  SpO2: 100% (08-13)  GENERAL: No acute distress, well-developed  HEAD:  Atraumatic, Normocephalic  ENT: EOMI, PERRLA, conjunctiva and sclera clear, Neck supple, No JVD, moist mucosa  CHEST/LUNG: Clear to auscultation bilaterally; No wheeze, equal breath sounds bilaterally   BACK: No spinal tenderness  HEART: Regular rate and rhythm; No murmurs, rubs, or gallops  ABDOMEN: Soft, Nontender, Nondistended; Bowel sounds present  EXTREMITIES:  No clubbing, cyanosis, or edema  PSYCH: Nl behavior, nl affect  NEUROLOGY: AAOx3, non-focal, cranial nerves intact  SKIN: Normal color, No rashes or lesions  LINES:    ECG: Personally reviewed    TTE  DIMENSIONS:  Dimensions:     Normal Values:  LA:     3.6 cm    2.0 - 4.0 cm  Ao:     2.2 cm    2.0 - 3.8 cm  SEPTUM: 0.7 cm    0.6 - 1.2 cm  PWT:    0.9 cm    0.6 - 1.1 cm  LVIDd:  4.6 cm    3.0 - 5.6 cm  LVIDs:  2.6 cm    1.8 - 4.0 cm  Derived Variables:  LVMI: 72 g/m2  RWT: 0.39  Fractional short: 43 %  Ejection Fraction (Modified Macedo Rule): 69 %  ------------------------------------------------------------------------  OBSERVATIONS:  Mitral Valve: Normal mitral valve. Minimal mitral  regurgitation.  Aortic Root: Normal aortic root.  Aortic Valve: Normal trileaflet aortic valve.  Left Atrium: Moderately dilated left atrium.  LA volume  index = 45 cc/m2.  Left Ventricle: Normal left ventricular systolic function.  No segmental wall motion abnormalities. Normal left  ventricular internal dimensions and wall thicknesses.  (DT:152 ms).  Right Heart: Normal right atrium. Right ventricular  enlargement with normal right ventricular systolic  function. Normal tricuspid valve.  Moderate-severe  tricuspid regurgitation. Normal pulmonic valve. Moderate  pulmonic regurgitation.  Pericardium/PleuraNormal pericardium with trace pericardial  effusion. Right pleural effusion.  Hemodynamic: Estimated right ventricular systolic pressure  equals 65 mm Hg, assuming right atrial pressure equals 10  mm Hg, consistent with severe pulmonary hypertension.  ------------------------------------------------------------------------  CONCLUSIONS:  1. Normal left ventricular systolic function. No segmental  wall motion abnormalities.  2. Right ventricular enlargement with normal right  ventricular systolic function.  3. Normal tricuspid valve. Moderate-severe tricuspid  regurgitation.  4. Estimated pulmonary artery systolic pressure equals 65  mm Hg, assuming right atrial pressure equals 10  mm Hg,  consistent with severe pulmonary hypertension.  *** Compared with echocardiogram of 2/27/2023, no  significant changes noted.    RHC  Diagnostic Conclusions:   RA 17 mmHg. PA 58/27/41 mmHg. PCW 22 mmHg.   CO 8.79 L/min (Hgb 7.7). CI 5.33 L/min/m2.  dsc. PVR 2.16 Kaur.     CXR: Personally reviewed    Labs: Personally reviewed                        7.5    7.74  )-----------( 166      ( 13 Aug 2023 06:31 )             22.4     08-13    131<L>  |  97<L>  |  62<H>  ----------------------------<  176<H>  5.6<H>   |  20<L>  |  2.47<H>    Ca    8.8      13 Aug 2023 06:31  Phos  6.2     08-13  Mg     2.20     08-13    TPro  7.6  /  Alb  3.9  /  TBili  0.5  /  DBili  x   /  AST  29  /  ALT  45<H>  /  AlkPhos  416<H>  08-12    PT/INR - ( 12 Aug 2023 06:49 )   PT: 12.3 sec;   INR: 1.10 ratio         PTT - ( 12 Aug 2023 06:49 )  PTT:39.5 sec    CARDIAC MARKERS ( 12 Aug 2023 06:49 )  51 ng/L / x     / x     / x     / x     / x      CARDIAC MARKERS ( 11 Aug 2023 19:40 )  48 ng/L / x     / x     / x     / x     / x        Thyroid Stimulating Hormone, Serum: 4.82 uIU/mL (08-12 @ 00:43) CARDIOLOGY FELLOW CONSULT NOTE    HPI:  57 yo F w/ HTN, HLD, DM2, CKD (stage 3-4), hypothyroidism (c/b myxedema coma in past), severe pulmonary hypertension, mod-severe TR, asthma, presents emergency department for 5 days of worsening dyspnea on exertion.     Patient states she was recently discharged 5 days ago for symptomatic anemia requiring blood transfusion with stay c/b HFpEF exacerbation due to the transfusions. Case d/w GI who recommended outpatient follow up for workup of anemia. Pt is due for repeat colonoscopy.  Reports MARKS with limitation in exertion but no chest pain, palpitations, or syncope. Reports nasal congestion but no sore throat. Otherwise reports blotchy rash on her body that started a few months ago and has progressively worsened. No other acute complaints.     Regarding prior pHTN workup, had [facility] January 2023, thought 2/2 volume overload. Pulm evaluated and CTPA without PE, no history of lung disease. CT imaging reviewed and consistent with pulmonary edema and no evidence of fibrosis    ED Course:  T98.8, HR 78 160/72 saturating 100% on RA.  Pt was dyspneic with SOB, elevated BNP, CXR hazier than previous. Given Lasix 40mg IV. Placed on bipap for comfort.  On my exam in the ED, patient resting comfortably on BIPAP 10/5 FiO2 21% RR 18 saturating 100% RA. Gave limited answers as was trying to sleep.    PMHx:   Benign essential HTN  HTN (hypertension)  HLD (hyperlipidemia)  DM (diabetes mellitus)  Hypothyroid  H/O pulmonary hypertension  CKD (chronic kidney disease)    PSHx:   No significant past surgical history  S/P cataract surgery    Allergies:  No Known Allergies    Home Meds:    Current Medications:   amLODIPine   Tablet 5 milliGRAM(s) Oral daily  atorvastatin 40 milliGRAM(s) Oral at bedtime  dextrose 5%. 1000 milliLiter(s) IV Continuous <Continuous>  dextrose 5%. 1000 milliLiter(s) IV Continuous <Continuous>  dextrose 50% Injectable 25 Gram(s) IV Push once  dextrose 50% Injectable 25 Gram(s) IV Push once  dextrose 50% Injectable 12.5 Gram(s) IV Push once  dextrose Oral Gel 15 Gram(s) Oral once PRN  glucagon  Injectable 1 milliGRAM(s) IntraMuscular once  hydrALAZINE 100 milliGRAM(s) Oral every 8 hours  insulin lispro (ADMELOG) corrective regimen sliding scale   SubCutaneous three times a day before meals  insulin lispro (ADMELOG) corrective regimen sliding scale   SubCutaneous at bedtime  levothyroxine 125 MICROGram(s) Oral daily  pantoprazole    Tablet 40 milliGRAM(s) Oral before breakfast  sodium zirconium cyclosilicate 10 Gram(s) Oral once    FAMILY HISTORY:  FH: stroke (Father)    Social History:  Smoking History:  Alcohol Use:  Drug Use:    REVIEW OF SYSTEMS:  CONSTITUTIONAL: No weakness, fevers or chills  EYES/ENT: No visual changes;  No dysphagia  NECK: No pain or stiffness  RESPIRATORY: No cough, wheezing, hemoptysis; No shortness of breath  CARDIOVASCULAR: No chest pain or palpitations; No lower extremity edema  GASTROINTESTINAL: No abdominal or epigastric pain. No nausea, vomiting, or hematemesis; No diarrhea or constipation. No melena or hematochezia.  BACK: No back pain  GENITOURINARY: No dysuria, frequency or hematuria  NEUROLOGICAL: No numbness or weakness  SKIN: No itching, burning, rashes, or lesions   All other review of systems is negative unless indicated above.    Physical Exam:  T(F): 98 (08-13), Max: 98.1 (08-13)  HR: 72 (08-13) (63 - 91)  BP: 144/63 (08-13) (132/85 - 161/76)  RR: 18 (08-13)  SpO2: 100% (08-13)  GENERAL: No acute distress, well-developed  HEAD:  Atraumatic, Normocephalic  ENT: EOMI, PERRLA, conjunctiva and sclera clear, Neck supple, No JVD, moist mucosa  CHEST/LUNG: Clear to auscultation bilaterally; No wheeze, equal breath sounds bilaterally   BACK: No spinal tenderness  HEART: Regular rate and rhythm; No murmurs, rubs, or gallops  ABDOMEN: Soft, Nontender, Nondistended; Bowel sounds present  EXTREMITIES:  No clubbing, cyanosis, or edema  PSYCH: Nl behavior, nl affect  NEUROLOGY: AAOx3, non-focal, cranial nerves intact  SKIN: Normal color, No rashes or lesions    ECG: Personally reviewed    TTE  DIMENSIONS:  Dimensions:     Normal Values:  LA:     3.6 cm    2.0 - 4.0 cm  Ao:     2.2 cm    2.0 - 3.8 cm  SEPTUM: 0.7 cm    0.6 - 1.2 cm  PWT:    0.9 cm    0.6 - 1.1 cm  LVIDd:  4.6 cm    3.0 - 5.6 cm  LVIDs:  2.6 cm    1.8 - 4.0 cm  Derived Variables:  LVMI: 72 g/m2  RWT: 0.39  Fractional short: 43 %  Ejection Fraction (Modified Macedo Rule): 69 %  ------------------------------------------------------------------------  OBSERVATIONS:  Mitral Valve: Normal mitral valve. Minimal mitral  regurgitation.  Aortic Root: Normal aortic root.  Aortic Valve: Normal trileaflet aortic valve.  Left Atrium: Moderately dilated left atrium.  LA volume  index = 45 cc/m2.  Left Ventricle: Normal left ventricular systolic function.  No segmental wall motion abnormalities. Normal left  ventricular internal dimensions and wall thicknesses.  (DT:152 ms).  Right Heart: Normal right atrium. Right ventricular  enlargement with normal right ventricular systolic  function. Normal tricuspid valve.  Moderate-severe  tricuspid regurgitation. Normal pulmonic valve. Moderate  pulmonic regurgitation.  Pericardium/PleuraNormal pericardium with trace pericardial  effusion. Right pleural effusion.  Hemodynamic: Estimated right ventricular systolic pressure  equals 65 mm Hg, assuming right atrial pressure equals 10  mm Hg, consistent with severe pulmonary hypertension.  ------------------------------------------------------------------------  CONCLUSIONS:  1. Normal left ventricular systolic function. No segmental  wall motion abnormalities.  2. Right ventricular enlargement with normal right  ventricular systolic function.  3. Normal tricuspid valve. Moderate-severe tricuspid  regurgitation.  4. Estimated pulmonary artery systolic pressure equals 65  mm Hg, assuming right atrial pressure equals 10  mm Hg,  consistent with severe pulmonary hypertension.  *** Compared with echocardiogram of 2/27/2023, no  significant changes noted.    RHC  Diagnostic Conclusions:   RA 17 mmHg. PA 58/27/41 mmHg. PCW 22 mmHg.   CO 8.79 L/min (Hgb 7.7). CI 5.33 L/min/m2.  dsc. PVR 2.16 Kaur.     CXR: Personally reviewed    Labs: Personally reviewed                        7.5    7.74  )-----------( 166      ( 13 Aug 2023 06:31 )             22.4     08-13    131<L>  |  97<L>  |  62<H>  ----------------------------<  176<H>  5.6<H>   |  20<L>  |  2.47<H>    Ca    8.8      13 Aug 2023 06:31  Phos  6.2     08-13  Mg     2.20     08-13    TPro  7.6  /  Alb  3.9  /  TBili  0.5  /  DBili  x   /  AST  29  /  ALT  45<H>  /  AlkPhos  416<H>  08-12    PT/INR - ( 12 Aug 2023 06:49 )   PT: 12.3 sec;   INR: 1.10 ratio         PTT - ( 12 Aug 2023 06:49 )  PTT:39.5 sec    CARDIAC MARKERS ( 12 Aug 2023 06:49 )  51 ng/L / x     / x     / x     / x     / x      CARDIAC MARKERS ( 11 Aug 2023 19:40 )  48 ng/L / x     / x     / x     / x     / x        Thyroid Stimulating Hormone, Serum: 4.82 uIU/mL (08-12 @ 00:43) CARDIOLOGY FELLOW CONSULT NOTE    HPI:  57 yo F w/ HTN, HLD, DM2, CKD (stage 3-4), hypothyroidism (c/b myxedema coma in past), severe pulmonary hypertension, mod-severe TR, asthma, presents emergency department for 5 days of worsening dyspnea on exertion.     Patient states she was recently discharged 5 days ago for symptomatic anemia (to the 7's) requiring blood transfusion with stay c/b HFpEF exacerbation due to the transfusions. Case d/w GI who recommended outpatient follow up for workup of anemia. Pt is due for repeat colonoscopy.  Over the days after discharge, with progressive orthopnea, bloatedness, dyspnea on exertion that progressed to even present at rest with lump in her throat. Also with nasal congestion. No other acute complaints.     Regarding prior pHTN workup, had [facility] January 2023, thought 2/2 volume overload. Pulm evaluated and CTPA without PE, no history of lung disease. CT imaging reviewed and consistent with pulmonary edema and no evidence of fibrosis    Hospital course:   AF HR 70s BP 140s-160s/60s-70s   Pt was dyspneic with SOB, elevated BNP, CXR hazier than previous. Given Lasix 40mg IV. Placed on bipap for comfort.  2mg IV BID on 8/12 and in am on 8/13.  Now weaned to Ra  Hgb 7.5 MCV 79.2 WBC 7.74 plt 166  131/5.6 | 97/20 | 62/2.47 < 176 Mg 2.2 Phos 6.2     PMHx:   Benign essential HTN  HTN (hypertension)  HLD (hyperlipidemia)  DM (diabetes mellitus)  Hypothyroid  H/O pulmonary hypertension  CKD (chronic kidney disease)    PSHx:   No significant past surgical history  S/P cataract surgery    Allergies:  No Known Allergies    Home Meds:    Current Medications:   amLODIPine   Tablet 5 milliGRAM(s) Oral daily  atorvastatin 40 milliGRAM(s) Oral at bedtime  dextrose 5%. 1000 milliLiter(s) IV Continuous <Continuous>  dextrose 5%. 1000 milliLiter(s) IV Continuous <Continuous>  dextrose 50% Injectable 25 Gram(s) IV Push once  dextrose 50% Injectable 25 Gram(s) IV Push once  dextrose 50% Injectable 12.5 Gram(s) IV Push once  dextrose Oral Gel 15 Gram(s) Oral once PRN  glucagon  Injectable 1 milliGRAM(s) IntraMuscular once  hydrALAZINE 100 milliGRAM(s) Oral every 8 hours  insulin lispro (ADMELOG) corrective regimen sliding scale   SubCutaneous three times a day before meals  insulin lispro (ADMELOG) corrective regimen sliding scale   SubCutaneous at bedtime  levothyroxine 125 MICROGram(s) Oral daily  pantoprazole    Tablet 40 milliGRAM(s) Oral before breakfast  sodium zirconium cyclosilicate 10 Gram(s) Oral once    FAMILY HISTORY:  FH: stroke (Father)    REVIEW OF SYSTEMS:  CONSTITUTIONAL: No weakness, fevers or chills  EYES/ENT: No visual changes;  No dysphagia  NECK: No pain or stiffness  RESPIRATORY: No cough, wheezing, hemoptysis; No shortness of breath  CARDIOVASCULAR: No chest pain or palpitations; No lower extremity edema  GASTROINTESTINAL: No abdominal or epigastric pain. No nausea, vomiting, or hematemesis; No diarrhea or constipation. No melena or hematochezia.  BACK: No back pain  GENITOURINARY: No dysuria, frequency or hematuria  NEUROLOGICAL: No numbness or weakness  SKIN: No itching, burning, rashes, or lesions   All other review of systems is negative unless indicated above.    Physical Exam:  T(F): 98 (08-13), Max: 98.1 (08-13)  HR: 72 (08-13) (63 - 91)  BP: 144/63 (08-13) (132/85 - 161/76)  RR: 18 (08-13)  SpO2: 100% (08-13)  GENERAL: No acute distress, well-developed  HEAD:  Atraumatic, Normocephalic  ENT: EOMI, PERRLA, conjunctiva and sclera clear, Neck supple, No JVD, moist mucosa  CHEST/LUNG: Mild crackles left lung base   BACK: No spinal tenderness  HEART: Regular rate and rhythm; No murmurs, rubs, or gallops  ABDOMEN: Soft, Nontender, Nondistended; Bowel sounds present  EXTREMITIES:  No clubbing, cyanosis, or edema  PSYCH: Nl behavior, nl affect  NEUROLOGY: AAOx3, non-focal, cranial nerves intact  SKIN: Normal color, No rashes or lesions    ECG: sinus rhythm, normal axis, near low voltage limb leads, no significant ST deviations     TTE  DIMENSIONS:  Dimensions:     Normal Values:  LA:     3.6 cm    2.0 - 4.0 cm  Ao:     2.2 cm    2.0 - 3.8 cm  SEPTUM: 0.7 cm    0.6 - 1.2 cm  PWT:    0.9 cm    0.6 - 1.1 cm  LVIDd:  4.6 cm    3.0 - 5.6 cm  LVIDs:  2.6 cm    1.8 - 4.0 cm  Derived Variables:  LVMI: 72 g/m2  RWT: 0.39  Fractional short: 43 %  Ejection Fraction (Modified Macedo Rule): 69 %  ------------------------------------------------------------------------  OBSERVATIONS:  Mitral Valve: Normal mitral valve. Minimal mitral  regurgitation.  Aortic Root: Normal aortic root.  Aortic Valve: Normal trileaflet aortic valve.  Left Atrium: Moderately dilated left atrium.  LA volume  index = 45 cc/m2.  Left Ventricle: Normal left ventricular systolic function.  No segmental wall motion abnormalities. Normal left  ventricular internal dimensions and wall thicknesses.  (DT:152 ms).  Right Heart: Normal right atrium. Right ventricular  enlargement with normal right ventricular systolic  function. Normal tricuspid valve.  Moderate-severe  tricuspid regurgitation. Normal pulmonic valve. Moderate  pulmonic regurgitation.  Pericardium/PleuraNormal pericardium with trace pericardial  effusion. Right pleural effusion.  Hemodynamic: Estimated right ventricular systolic pressure  equals 65 mm Hg, assuming right atrial pressure equals 10  mm Hg, consistent with severe pulmonary hypertension.  ------------------------------------------------------------------------  CONCLUSIONS:  1. Normal left ventricular systolic function. No segmental  wall motion abnormalities.  2. Right ventricular enlargement with normal right  ventricular systolic function.  3. Normal tricuspid valve. Moderate-severe tricuspid  regurgitation.  4. Estimated pulmonary artery systolic pressure equals 65  mm Hg, assuming right atrial pressure equals 10  mm Hg,  consistent with severe pulmonary hypertension.  *** Compared with echocardiogram of 2/27/2023, no  significant changes noted.    RHC  Diagnostic Conclusions:   RA 17 mmHg. PA 58/27/41 mmHg. PCW 22 mmHg.   CO 8.79 L/min (Hgb 7.7). CI 5.33 L/min/m2.  dsc. PVR 2.16 Kaur.     CXR : congested     Labs: Personally reviewed                        7.5    7.74  )-----------( 166      ( 13 Aug 2023 06:31 )             22.4     08-13    131<L>  |  97<L>  |  62<H>  ----------------------------<  176<H>  5.6<H>   |  20<L>  |  2.47<H>    Ca    8.8      13 Aug 2023 06:31  Phos  6.2     08-13  Mg     2.20     08-13    TPro  7.6  /  Alb  3.9  /  TBili  0.5  /  DBili  x   /  AST  29  /  ALT  45<H>  /  AlkPhos  416<H>  08-12    PT/INR - ( 12 Aug 2023 06:49 )   PT: 12.3 sec;   INR: 1.10 ratio         PTT - ( 12 Aug 2023 06:49 )  PTT:39.5 sec    CARDIAC MARKERS ( 12 Aug 2023 06:49 )  51 ng/L / x     / x     / x     / x     / x      CARDIAC MARKERS ( 11 Aug 2023 19:40 )  48 ng/L / x     / x     / x     / x     / x        Thyroid Stimulating Hormone, Serum: 4.82 uIU/mL (08-12 @ 00:43)

## 2023-08-13 NOTE — CONSULT NOTE ADULT - ASSESSMENT
57 yo F w/ HTN, HLD, DM2, CKD (stage 3-4), hypothyroidism (c/b myxedema coma in past), severe pulmonary hypertension, mod-severe TR, asthma, presents emergency department for 5 days of worsening dyspnea on exertion after recent admission for pRBC transfusion.   Volume overload improved on diuretics, but now with resultant ISAMAR     Impression:  Volume overload in the setting of recent transfusion  pHTN (WHO class II) based on prior RHC  HTN   Anemia (microcytic)   Hyperkalemia, ISAMAR     Plan:  - Suspect overdiuresis for isamar, she is now with only mild crackles at left lung base that improves with cough and suspect atelectasis. No longer orthopneic. Hold diuretic as per nephro and resume maintenance 1mg bid on discharge  - HTN control with uptitration goal < 130/80 (amlodipine can be increased to 10mg, continue hydral)  - Treat electrolyte abn with hyperkalemia cocktail   - qoD iron supplementation

## 2023-08-13 NOTE — PROGRESS NOTE ADULT - SUBJECTIVE AND OBJECTIVE BOX
[facility] - Department of Medicine   Kyra Tovar DO   Pager: [phone]    SUBJECTIVE / OVERNIGHT EVENTS: Pt seen and examined at bedside in the morning Doing ok. States that the IV diuretic is too strong and she feels very dry, will discontinue afternoon dose for now and explained we will be calling cardio and renal consults today to help manage her care given her multiple hospital admissions for HF exacerbation + class 2 pulm HTN. Explained she needs GI work-up outpatient for anemia - spoke to her daughter Sulema and gave her phone number for GI clinic to follow-up outpatient. Otherwise, she states that her breathing is better and denies any medical complaints this AM.     MEDICATIONS  (STANDING):  amLODIPine   Tablet 5 milliGRAM(s) Oral daily  atorvastatin 40 milliGRAM(s) Oral at bedtime  dextrose 5%. 1000 milliLiter(s) (50 mL/Hr) IV Continuous <Continuous>  dextrose 5%. 1000 milliLiter(s) (100 mL/Hr) IV Continuous <Continuous>  dextrose 50% Injectable 25 Gram(s) IV Push once  dextrose 50% Injectable 12.5 Gram(s) IV Push once  dextrose 50% Injectable 25 Gram(s) IV Push once  glucagon  Injectable 1 milliGRAM(s) IntraMuscular once  hydrALAZINE 100 milliGRAM(s) Oral every 8 hours  insulin lispro (ADMELOG) corrective regimen sliding scale   SubCutaneous three times a day before meals  insulin lispro (ADMELOG) corrective regimen sliding scale   SubCutaneous at bedtime  levothyroxine 125 MICROGram(s) Oral daily  pantoprazole    Tablet 40 milliGRAM(s) Oral before breakfast  sodium bicarbonate 1300 milliGRAM(s) Oral three times a day    MEDICATIONS  (PRN):  dextrose Oral Gel 15 Gram(s) Oral once PRN Blood Glucose LESS THAN 70 milliGRAM(s)/deciliter      Vital Signs Last 24 Hrs  T(C): 37 (13 Aug 2023 14:20), Max: 37 (13 Aug 2023 14:20)  T(F): 98.6 (13 Aug 2023 14:20), Max: 98.6 (13 Aug 2023 14:20)  HR: 72 (13 Aug 2023 15:47) (63 - 72)  BP: 145/64 (13 Aug 2023 14:20) (144/63 - 161/76)  BP(mean): --  RR: 18 (13 Aug 2023 14:20) (18 - 18)  SpO2: 100% (13 Aug 2023 15:47) (97% - 100%)    Parameters below as of 13 Aug 2023 14:20  Patient On (Oxygen Delivery Method): room air      CAPILLARY BLOOD GLUCOSE      POCT Blood Glucose.: 247 mg/dL (13 Aug 2023 13:44)  POCT Blood Glucose.: 187 mg/dL (13 Aug 2023 09:35)  POCT Blood Glucose.: 268 mg/dL (12 Aug 2023 21:50)  POCT Blood Glucose.: 214 mg/dL (12 Aug 2023 19:31)      PHYSICAL EXAM:  GENERAL: NAD, well-developed  HEAD:  Atraumatic, Normocephalic  EYES: EOMI, conjunctiva and sclera clear  NECK: Supple, No JVD  CHEST/LUNG: Clear to auscultation bilaterally; No wheeze  HEART: Regular rate and rhythm; No murmurs, rubs, or gallops  ABDOMEN: Soft, Nontender, Nondistended; Bowel sounds present  EXTREMITIES:  2+ Peripheral Pulses, No clubbing, cyanosis, or edema  PSYCH: AAOx3  NEUROLOGY: non-focal  SKIN: No rashes or lesions    LABS:                        7.5    7.74  )-----------( 166      ( 13 Aug 2023 06:31 )             22.4     08-13    131<L>  |  97<L>  |  62<H>  ----------------------------<  176<H>  5.6<H>   |  20<L>  |  2.47<H>    Ca    8.8      13 Aug 2023 06:31  Phos  6.2     08-13  Mg     2.20     08-13    TPro  7.6  /  Alb  3.9  /  TBili  0.5  /  DBili  x   /  AST  29  /  ALT  45<H>  /  AlkPhos  416<H>  08-12    PT/INR - ( 12 Aug 2023 06:49 )   PT: 12.3 sec;   INR: 1.10 ratio         PTT - ( 12 Aug 2023 06:49 )  PTT:39.5 sec      Urinalysis Basic - ( 13 Aug 2023 06:31 )    Color: x / Appearance: x / SG: x / pH: x  Gluc: 176 mg/dL / Ketone: x  / Bili: x / Urobili: x   Blood: x / Protein: x / Nitrite: x   Leuk Esterase: x / RBC: x / WBC x   Sq Epi: x / Non Sq Epi: x / Bacteria: x        RADIOLOGY & ADDITIONAL TESTS:

## 2023-08-13 NOTE — CONSULT NOTE ADULT - TIME BILLING
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 She presented with 5 days of worsening MARKS after recent admission for pRBC transfusion. Volume overload status improved on diuretics, but with resultant ISAMAR, possibly a result of over diuresis.

## 2023-08-13 NOTE — CONSULT NOTE ADULT - PROBLEM SELECTOR RECOMMENDATION 9
Pt with ISAMAR on CKD likely in setting of HF exacerbation. Per [facility], baseline Scr ~2, admitted with Scr of 1.62, now increased to 2.47. At home, she was on bumex 2mg PO BID, given 40 IV lasix on admission, then bumex 2mg IV BID on 8/12 and in am on 8/13. Patient appears euvolemic at this time. Has a lung etiology also contributing to her SOB. She was admitted 8/4-8/7 with SOB/volume overload after a pRBC transfusion and may not have been completely optimized prior to DC on 8/11. Please obtain UA, Upr/creat. PVR to ensure no urinary retention. Agree with PO bumex 2mg BID at this time as patient euvolemic. Monitor BMP and I/O. Dose meds per eGFR and avoid nephrotoxic agents.     With recurrent AKIs from volume overload, it is likely patient's baseline Scr will slowly increase. May benefit from cardiomems device in future if has recurrent admissions for volume overload. Pt with ISAMAR on CKD likely in setting of HF exacerbation. Per [facility], baseline Scr ~2, admitted with Scr of 1.62, now increased to 2.47. At home, she was on bumex 2mg PO BID, given 40 IV lasix on admission, then bumex 2mg IV BID on 8/12 and in am on 8/13.  Has a lung etiology also contributing to her SOB. She was admitted 8/4-8/7 with SOB/volume overload after a pRBC transfusion and may not have been completely optimized prior to DC on 8/11. With recurrent AKIs from volume overload, it is likely patient's baseline Scr will slowly increase. May benefit from cardiomems device in future if has recurrent admissions for volume overload.    Would recommend continuing with Bumex IV for today given the severity of her presenting symptoms.  Please obtain UA, Upr/creat. PVR to ensure no urinary retention. Monitor BMP and I/O. Dose meds per eGFR and avoid nephrotoxic agents. Pt with ISAMAR on CKD likely in setting of HF exacerbation. Per [facility], baseline Scr ~2, admitted with Scr of 1.62, now increased to 2.47. At home, she was on bumex 1mg PO BID, given 40 IV lasix on admission, then bumex 2mg IV BID on 8/12 and in am on 8/13.  Has a lung etiology also contributing to her SOB. She was admitted 8/4-8/7 with SOB/volume overload after a pRBC transfusion and may not have been completely optimized prior to DC on 8/11. With recurrent AKIs from volume overload, it is likely patient's baseline Scr will slowly increase. May benefit from cardiomems device in future if has recurrent admissions for volume overload.    Would recommend continuing with Bumex IV for today given the severity of her presenting symptoms.  Please obtain UA, Upr/creat. PVR to ensure no urinary retention. Monitor BMP and I/O. Dose meds per eGFR and avoid nephrotoxic agents. Pt with ISAMAR on CKD likely in setting of HF exacerbation. Per [facility], baseline Scr ~2, admitted with Scr of 1.62, now increased to 2.47. At home, she was on bumex 1 mg PO BID, given 40 IV lasix on admission, then bumex 2mg IV BID on 8/12 and in am on 8/13.  Has a lung etiology also contributing to her SOB. She was admitted 8/4-8/7 with SOB/volume overload after a pRBC transfusion and may not have been completely optimized prior to DC on 8/11.     Would recommend continuing with Bumex IV for today given the severity of her presenting symptoms.  Please obtain UA, Upr/creat. PVR to ensure no urinary retention. Monitor BMP and I/O. Dose meds per eGFR and avoid nephrotoxic agents.

## 2023-08-13 NOTE — CONSULT NOTE ADULT - ATTENDING COMMENTS
Shila Hernandez is a 58-year-old woman with history of HTN, HLD, DM2, CKD (stage 3-4), hypothyroidism (c/b myxedema coma in past), severe pulmonary hypertension, mod-severe TR and asthma. She presented with 5 days of worsening MARKS after recent admission for pRBC transfusion. Volume overload status improved on diuretics, but with resultant ISAMAR, possibly a result of over diuresis. Hold diuretic as indicated by nephrology and resume maintenance of bumetanide (Bumex) 1mg oral twice daily, upon discharge. HTN management is with amlodipine and hydralazine.  Standing regimen: amlodipine (Norvasc) 5 mg oral once daily, atorvastatin (LIpitor) 40 mg oral at bedtime, heparin 5000 Units SC every 12 hours, hydralazine 100 mg oral 3X daily' insulin lispro (Admelog) corrective regimen sliding scale SC 3X daily before meals and once at bedtime, levothyroxine 125 mCg oral once daily, pantoprazole 40 mg oral before breakfast and sodium bicarbonate 1300 mg oral 3X daily
seen and evaluated this morning 8/14 still with hyponatremia elevated JVP noted on my examination.  would recommend continue IV bumex again today and re-evaluated tomorrow.

## 2023-08-14 LAB
ANION GAP SERPL CALC-SCNC: 13 MMOL/L — SIGNIFICANT CHANGE UP (ref 7–14)
ANION GAP SERPL CALC-SCNC: 14 MMOL/L — SIGNIFICANT CHANGE UP (ref 7–14)
APPEARANCE UR: CLEAR — SIGNIFICANT CHANGE UP
BACTERIA # UR AUTO: NEGATIVE /HPF — SIGNIFICANT CHANGE UP
BASE EXCESS BLDV CALC-SCNC: -3.5 MMOL/L — LOW (ref -2–3)
BILIRUB UR-MCNC: NEGATIVE — SIGNIFICANT CHANGE UP
BUN SERPL-MCNC: 64 MG/DL — HIGH (ref 7–23)
BUN SERPL-MCNC: 64 MG/DL — HIGH (ref 7–23)
CA-I SERPL-SCNC: 1.15 MMOL/L — SIGNIFICANT CHANGE UP (ref 1.15–1.33)
CALCIUM SERPL-MCNC: 8.8 MG/DL — SIGNIFICANT CHANGE UP (ref 8.4–10.5)
CALCIUM SERPL-MCNC: 8.8 MG/DL — SIGNIFICANT CHANGE UP (ref 8.4–10.5)
CAST: 0 /LPF — SIGNIFICANT CHANGE UP (ref 0–4)
CHLORIDE BLDV-SCNC: 99 MMOL/L — SIGNIFICANT CHANGE UP (ref 96–108)
CHLORIDE SERPL-SCNC: 97 MMOL/L — LOW (ref 98–107)
CHLORIDE SERPL-SCNC: 97 MMOL/L — LOW (ref 98–107)
CO2 BLDV-SCNC: 22.5 MMOL/L — SIGNIFICANT CHANGE UP (ref 22–26)
CO2 SERPL-SCNC: 21 MMOL/L — LOW (ref 22–31)
CO2 SERPL-SCNC: 21 MMOL/L — LOW (ref 22–31)
COLOR SPEC: YELLOW — SIGNIFICANT CHANGE UP
CREAT ?TM UR-MCNC: 46 MG/DL — SIGNIFICANT CHANGE UP
CREAT SERPL-MCNC: 2.5 MG/DL — HIGH (ref 0.5–1.3)
CREAT SERPL-MCNC: 2.51 MG/DL — HIGH (ref 0.5–1.3)
DIFF PNL FLD: NEGATIVE — SIGNIFICANT CHANGE UP
EGFR: 22 ML/MIN/1.73M2 — LOW
EGFR: 22 ML/MIN/1.73M2 — LOW
GAS PNL BLDV: 130 MMOL/L — LOW (ref 136–145)
GAS PNL BLDV: SIGNIFICANT CHANGE UP
GLUCOSE BLDC GLUCOMTR-MCNC: 219 MG/DL — HIGH (ref 70–99)
GLUCOSE BLDC GLUCOMTR-MCNC: 221 MG/DL — HIGH (ref 70–99)
GLUCOSE BLDC GLUCOMTR-MCNC: 255 MG/DL — HIGH (ref 70–99)
GLUCOSE BLDV-MCNC: 211 MG/DL — HIGH (ref 70–99)
GLUCOSE SERPL-MCNC: 116 MG/DL — HIGH (ref 70–99)
GLUCOSE SERPL-MCNC: 212 MG/DL — HIGH (ref 70–99)
GLUCOSE UR QL: 100 MG/DL
HCO3 BLDV-SCNC: 21 MMOL/L — LOW (ref 22–29)
HCT VFR BLD CALC: 22.6 % — LOW (ref 34.5–45)
HCT VFR BLDA CALC: 27 % — LOW (ref 34.5–46.5)
HGB BLD CALC-MCNC: 8.9 G/DL — LOW (ref 11.7–16.1)
HGB BLD-MCNC: 7.4 G/DL — LOW (ref 11.5–15.5)
KETONES UR-MCNC: NEGATIVE MG/DL — SIGNIFICANT CHANGE UP
LACTATE BLDV-MCNC: 1 MMOL/L — SIGNIFICANT CHANGE UP (ref 0.5–2)
LACTATE SERPL-SCNC: 1 MMOL/L — SIGNIFICANT CHANGE UP (ref 0.5–2)
LEUKOCYTE ESTERASE UR-ACNC: ABNORMAL
MAGNESIUM SERPL-MCNC: 2 MG/DL — SIGNIFICANT CHANGE UP (ref 1.6–2.6)
MAGNESIUM SERPL-MCNC: 2.1 MG/DL — SIGNIFICANT CHANGE UP (ref 1.6–2.6)
MCHC RBC-ENTMCNC: 25.8 PG — LOW (ref 27–34)
MCHC RBC-ENTMCNC: 32.7 GM/DL — SIGNIFICANT CHANGE UP (ref 32–36)
MCV RBC AUTO: 78.7 FL — LOW (ref 80–100)
NITRITE UR-MCNC: NEGATIVE — SIGNIFICANT CHANGE UP
NRBC # BLD: 0 /100 WBCS — SIGNIFICANT CHANGE UP (ref 0–0)
NRBC # FLD: 0 K/UL — SIGNIFICANT CHANGE UP (ref 0–0)
PCO2 BLDV: 37 MMHG — LOW (ref 39–52)
PH BLDV: 7.37 — SIGNIFICANT CHANGE UP (ref 7.32–7.43)
PH UR: 6.5 — SIGNIFICANT CHANGE UP (ref 5–8)
PHOSPHATE SERPL-MCNC: 4.9 MG/DL — HIGH (ref 2.5–4.5)
PHOSPHATE SERPL-MCNC: 5.7 MG/DL — HIGH (ref 2.5–4.5)
PLATELET # BLD AUTO: 130 K/UL — LOW (ref 150–400)
PO2 BLDV: 55 MMHG — HIGH (ref 25–45)
POTASSIUM BLDV-SCNC: 5.4 MMOL/L — HIGH (ref 3.5–5.1)
POTASSIUM SERPL-MCNC: 4.8 MMOL/L — SIGNIFICANT CHANGE UP (ref 3.5–5.3)
POTASSIUM SERPL-MCNC: 5.3 MMOL/L — SIGNIFICANT CHANGE UP (ref 3.5–5.3)
POTASSIUM SERPL-SCNC: 4.8 MMOL/L — SIGNIFICANT CHANGE UP (ref 3.5–5.3)
POTASSIUM SERPL-SCNC: 5.3 MMOL/L — SIGNIFICANT CHANGE UP (ref 3.5–5.3)
PROT ?TM UR-MCNC: 183 MG/DL — SIGNIFICANT CHANGE UP
PROT UR-MCNC: 300 MG/DL
PROT/CREAT UR-RTO: 4 RATIO — HIGH (ref 0–0.2)
RBC # BLD: 2.87 M/UL — LOW (ref 3.8–5.2)
RBC # FLD: 13.6 % — SIGNIFICANT CHANGE UP (ref 10.3–14.5)
RBC CASTS # UR COMP ASSIST: 6 /HPF — HIGH (ref 0–4)
REVIEW: SIGNIFICANT CHANGE UP
SAO2 % BLDV: 85.5 % — SIGNIFICANT CHANGE UP (ref 67–88)
SODIUM SERPL-SCNC: 131 MMOL/L — LOW (ref 135–145)
SODIUM SERPL-SCNC: 132 MMOL/L — LOW (ref 135–145)
SP GR SPEC: 1.01 — SIGNIFICANT CHANGE UP (ref 1–1.03)
SQUAMOUS # UR AUTO: 2 /HPF — SIGNIFICANT CHANGE UP (ref 0–5)
UROBILINOGEN FLD QL: 0.2 MG/DL — SIGNIFICANT CHANGE UP (ref 0.2–1)
WBC # BLD: 5.8 K/UL — SIGNIFICANT CHANGE UP (ref 3.8–10.5)
WBC # FLD AUTO: 5.8 K/UL — SIGNIFICANT CHANGE UP (ref 3.8–10.5)
WBC UR QL: 4 /HPF — SIGNIFICANT CHANGE UP (ref 0–5)

## 2023-08-14 PROCEDURE — 71045 X-RAY EXAM CHEST 1 VIEW: CPT | Mod: 26

## 2023-08-14 PROCEDURE — 99223 1ST HOSP IP/OBS HIGH 75: CPT

## 2023-08-14 PROCEDURE — 99232 SBSQ HOSP IP/OBS MODERATE 35: CPT

## 2023-08-14 PROCEDURE — 99233 SBSQ HOSP IP/OBS HIGH 50: CPT

## 2023-08-14 RX ADMIN — PANTOPRAZOLE SODIUM 40 MILLIGRAM(S): 20 TABLET, DELAYED RELEASE ORAL at 05:12

## 2023-08-14 RX ADMIN — Medication 2: at 12:02

## 2023-08-14 RX ADMIN — Medication 1300 MILLIGRAM(S): at 21:59

## 2023-08-14 RX ADMIN — Medication 1300 MILLIGRAM(S): at 13:46

## 2023-08-14 RX ADMIN — Medication 100 MILLIGRAM(S): at 22:00

## 2023-08-14 RX ADMIN — HEPARIN SODIUM 5000 UNIT(S): 5000 INJECTION INTRAVENOUS; SUBCUTANEOUS at 05:13

## 2023-08-14 RX ADMIN — Medication 2: at 18:05

## 2023-08-14 RX ADMIN — HEPARIN SODIUM 5000 UNIT(S): 5000 INJECTION INTRAVENOUS; SUBCUTANEOUS at 18:06

## 2023-08-14 RX ADMIN — ATORVASTATIN CALCIUM 40 MILLIGRAM(S): 80 TABLET, FILM COATED ORAL at 21:59

## 2023-08-14 RX ADMIN — Medication 1300 MILLIGRAM(S): at 05:12

## 2023-08-14 RX ADMIN — Medication 100 MILLIGRAM(S): at 05:11

## 2023-08-14 RX ADMIN — Medication 125 MICROGRAM(S): at 05:12

## 2023-08-14 RX ADMIN — AMLODIPINE BESYLATE 5 MILLIGRAM(S): 2.5 TABLET ORAL at 05:12

## 2023-08-14 RX ADMIN — Medication 100 MILLIGRAM(S): at 13:45

## 2023-08-14 NOTE — PROGRESS NOTE ADULT - ASSESSMENT
57 yo F w/ HTN, HLD, DM2, CKD (stage 3-4), hypothyroidism (c/b myxedema coma in past), severe pulmonary hypertension, mod-severe TR, asthma, presenting for acute shortness of breath found to be in CHF exacerbation.

## 2023-08-14 NOTE — PROGRESS NOTE ADULT - PROBLEM SELECTOR PLAN 5
- Admission earlier this month for symptomatic anemia, given 1U PRBC  - Prior iron studies demonstrate elevated ferritin, consistent with anemia of chronic disease with suspected component of anemia of CKD  - Pt needs to follow up with GI per last admission  - Hgb stable on admission. - Admission earlier this month for symptomatic anemia, given 1U PRBC  - Prior iron studies demonstrate elevated ferritin, consistent with anemia of chronic disease with suspected component of anemia of CKD  - Pt needs to follow up with GI per last admission  - Hgb stable on admission - remains stable >7    Check iron studies.  Outpatient GI follow-up as recommended on previous admission. Potentially hematology outpatient as well.

## 2023-08-14 NOTE — PROGRESS NOTE ADULT - PROBLEM SELECTOR PLAN 1
- Home regimen PO bumex 1mg PO BID  - BNP 6228, CXR with persistent small L pleural effusion but appears to be more hazy than CXR 8/4.  - TTE 8/6/23 with LVEF 69%, mod-sev TR, severe pulmonary HTN  - Weaned off BIPAP -> now on O2  - discontinued Bumex 2mg BID IV for now, will likely restart PO on discharge, pending cardio/renal recs   - Given the discrepancy between LV function and class 2 pulm HTN, consulted cardio - appreciate input  - Strict I/O, daily weight. Pt has had multiple admissions for CHF, please document dry weight upon discharge #Acute hypoxic respiratory failure - resolved, currently weaned to room air  - Home regimen PO bumex 1mg PO BID  - BNP 6228, CXR with persistent small L pleural effusion but appears to be more hazy than CXR 8/4.  - TTE 8/6/23 with LVEF 69%, mod-sev TR, severe pulmonary HTN  - Weaned off BIPAP -> now on room air  - discontinued Bumex 2mg BID IV for now, will likely restart PO on discharge, pending cardio/renal recs   - Given the discrepancy between LV function and class 2 pulm HTN, consulted cardio - appreciate input  - Strict I/O, daily weight. Pt has had multiple admissions for CHF, please document dry weight upon discharge

## 2023-08-14 NOTE — PROGRESS NOTE ADULT - SUBJECTIVE AND OBJECTIVE BOX
[facility]  Richmond Saeed DO  Available via MS Teams  In house pager [phone]    SUBJECTIVE / OVERNIGHT EVENTS:  No acute events overnight.  Denies acute complaints.  hopes to go home soon. Understands we are keeping a close eye on her kidney function.    ADDITIONAL REVIEW OF SYSTEMS:    MEDICATIONS  (STANDING):  amLODIPine   Tablet 5 milliGRAM(s) Oral daily  atorvastatin 40 milliGRAM(s) Oral at bedtime  dextrose 5%. 1000 milliLiter(s) (50 mL/Hr) IV Continuous <Continuous>  dextrose 5%. 1000 milliLiter(s) (100 mL/Hr) IV Continuous <Continuous>  dextrose 50% Injectable 25 Gram(s) IV Push once  dextrose 50% Injectable 25 Gram(s) IV Push once  dextrose 50% Injectable 12.5 Gram(s) IV Push once  glucagon  Injectable 1 milliGRAM(s) IntraMuscular once  heparin   Injectable 5000 Unit(s) SubCutaneous every 12 hours  hydrALAZINE 100 milliGRAM(s) Oral every 8 hours  insulin lispro (ADMELOG) corrective regimen sliding scale   SubCutaneous three times a day before meals  insulin lispro (ADMELOG) corrective regimen sliding scale   SubCutaneous at bedtime  levothyroxine 125 MICROGram(s) Oral daily  pantoprazole    Tablet 40 milliGRAM(s) Oral before breakfast  sodium bicarbonate 1300 milliGRAM(s) Oral three times a day    MEDICATIONS  (PRN):  dextrose Oral Gel 15 Gram(s) Oral once PRN Blood Glucose LESS THAN 70 milliGRAM(s)/deciliter  guaiFENesin Oral Liquid (Sugar-Free) 200 milliGRAM(s) Oral every 6 hours PRN Cough      I&O's Summary    13 Aug 2023 07:01  -  14 Aug 2023 07:00  --------------------------------------------------------  IN: 720 mL / OUT: 400 mL / NET: 320 mL    14 Aug 2023 07:01  -  14 Aug 2023 13:42  --------------------------------------------------------  IN: 236 mL / OUT: 0 mL / NET: 236 mL        PHYSICAL EXAM:  Vital Signs Last 24 Hrs  T(C): 36.4 (14 Aug 2023 13:00), Max: 37.1 (14 Aug 2023 02:10)  T(F): 97.6 (14 Aug 2023 13:00), Max: 98.7 (14 Aug 2023 02:10)  HR: 62 (14 Aug 2023 13:00) (62 - 75)  BP: 138/60 (14 Aug 2023 13:00) (133/60 - 148/66)  BP(mean): --  RR: 18 (14 Aug 2023 13:00) (17 - 18)  SpO2: 98% (14 Aug 2023 13:00) (96% - 100%)    Parameters below as of 14 Aug 2023 13:00  Patient On (Oxygen Delivery Method): room air      CONSTITUTIONAL: NAD, well-developed, well-groomed  EYES: PERRLA; conjunctiva and sclera clear  ENMT: Moist oral mucosa, no pharyngeal injection or exudates; normal dentition  NECK: Supple, no palpable masses; no thyromegaly  RESPIRATORY: Normal respiratory effort; lungs are clear to auscultation bilaterally  CARDIOVASCULAR: Regular rate and rhythm, normal S1 and S2, no murmur/rub/gallop; No lower extremity edema; Peripheral pulses are 2+ bilaterally  ABDOMEN: Nontender to palpation, normoactive bowel sounds, no rebound/guarding; No hepatosplenomegaly  MUSCULOSKELETAL:  no clubbing or cyanosis of digits; no joint swelling or tenderness to palpation  PSYCH: A+O to person, place, and time; affect appropriate  NEUROLOGY: CN 2-12 are intact and symmetric; no gross sensory deficits   SKIN: No rashes; no palpable lesions    LABS:                        7.4    5.80  )-----------( 130      ( 14 Aug 2023 05:00 )             22.6     08-14    132<L>  |  97<L>  |  64<H>  ----------------------------<  116<H>  4.8   |  21<L>  |  2.51<H>    Ca    8.8      14 Aug 2023 05:00  Phos  5.7     08-14  Mg     2.10     08-14            Urinalysis Basic - ( 14 Aug 2023 05:00 )    Color: x / Appearance: x / SG: x / pH: x  Gluc: 116 mg/dL / Ketone: x  / Bili: x / Urobili: x   Blood: x / Protein: x / Nitrite: x   Leuk Esterase: x / RBC: x / WBC x   Sq Epi: x / Non Sq Epi: x / Bacteria: x        SARS-CoV-2: NotDetec (11 Aug 2023 21:46)  SARS-CoV-2: NotDetec (22 Jun 2023 05:10)

## 2023-08-14 NOTE — PROGRESS NOTE ADULT - SUBJECTIVE AND OBJECTIVE BOX
Cardiology Progress Note  ------------------------------------------------------------------------------------------  SUBJECTIVE:   No events overnight. Denies CP, SOB or Palpitations.   Tele: NSR  -------------------------------------------------------------------------------------------  ROS:  CV: chest pain (-), palpitation (-), orthopnea (-), PND (-), edema (-)  PULM: SOB (-), cough (-), wheezing (-), hemoptysis (-).   CONST: fever (-), chills (-) or fatigue (-)  GI: abdominal distension (-), abdominal pain (-) , nausea/vomiting (-), hematemesis, (-), melena (-), hematochezia (-)  : dysuria (-), frequency (-), hematuria (-).   NEURO: numbness (-), weakness (-), dizziness (-)  MSK: myalgia (-), joint pain (-)   SKIN: itching (-), rash (-)  HEENT:  visual changes (-); vertigo or throat pain (-);  neck stiffness (-)   Psych: change in mood (-), anxiety (-), depression (-)     All other review of systems is negative unless indicated above.   -------------------------------------------------------------------------------------------  VS:  T(F): 98.6 (08-14), Max: 98.7 (08-14)  HR: 71 (08-14) (63 - 75)  BP: 146/64 (08-14) (139/67 - 148/66)  RR: 18 (08-14)  SpO2: 96% (08-14)  I&O's Summary    13 Aug 2023 07:01  -  14 Aug 2023 07:00  --------------------------------------------------------  IN: 720 mL / OUT: 400 mL / NET: 320 mL      ------------------------------------------------------------------------------------------  PHYSICAL EXAM:  General: No acute distress. Awake and conversant.   Eyes: Normal conjunctiva, anicteric. Round symmetric pupils.   ENT: Hearing grossly intact. No nasal discharge.   Neck: Neck is supple. No masses or thyromegaly.   Respiratory: Respirations are non-labored. No wheezing.   Skin: Warm. No rashes or ulcers.   Psych: Alert and oriented. Cooperative, Appropriate mood and affect, Normal judgment.   CV: RRR, no MRG. No lower extremity edema.   MSK: Normal ambulation. No clubbing or cyanosis.   Neuro: Sensation and CN II-XII grossly normal.  -------------------------------------------------------------------------------------------  LABS:                          7.4    5.80  )-----------( 130      ( 14 Aug 2023 05:00 )             22.6     08-14    132<L>  |  97<L>  |  64<H>  ----------------------------<  116<H>  4.8   |  21<L>  |  2.51<H>    Ca    8.8      14 Aug 2023 05:00  Phos  5.7     08-14  Mg     2.10     08-14        CARDIAC MARKERS ( 12 Aug 2023 06:49 )  51 ng/L / x     / x     / x     / x     / x      CARDIAC MARKERS ( 11 Aug 2023 19:40 )  48 ng/L / x     / x     / x     / x     / x                -------------------------------------------------------------------------------------------  Meds:  amLODIPine   Tablet 5 milliGRAM(s) Oral daily  atorvastatin 40 milliGRAM(s) Oral at bedtime  dextrose 5%. 1000 milliLiter(s) IV Continuous <Continuous>  dextrose 5%. 1000 milliLiter(s) IV Continuous <Continuous>  dextrose 50% Injectable 25 Gram(s) IV Push once  dextrose 50% Injectable 12.5 Gram(s) IV Push once  dextrose 50% Injectable 25 Gram(s) IV Push once  dextrose Oral Gel 15 Gram(s) Oral once PRN  glucagon  Injectable 1 milliGRAM(s) IntraMuscular once  guaiFENesin Oral Liquid (Sugar-Free) 200 milliGRAM(s) Oral every 6 hours PRN  heparin   Injectable 5000 Unit(s) SubCutaneous every 12 hours  hydrALAZINE 100 milliGRAM(s) Oral every 8 hours  insulin lispro (ADMELOG) corrective regimen sliding scale   SubCutaneous three times a day before meals  insulin lispro (ADMELOG) corrective regimen sliding scale   SubCutaneous at bedtime  levothyroxine 125 MICROGram(s) Oral daily  pantoprazole    Tablet 40 milliGRAM(s) Oral before breakfast  sodium bicarbonate 1300 milliGRAM(s) Oral three times a day    -------------------------------------------------------------------------------------------  Cardiovascular Diagnostic Testing:    ECG: sinus rhythm, normal axis, near low voltage limb leads, no significant ST deviations     Echo:   TTE  DIMENSIONS:  Dimensions:     Normal Values:  LA:     3.6 cm    2.0 - 4.0 cm  Ao:     2.2 cm    2.0 - 3.8 cm  SEPTUM: 0.7 cm    0.6 - 1.2 cm  PWT:    0.9 cm    0.6 - 1.1 cm  LVIDd:  4.6 cm    3.0 - 5.6 cm  LVIDs:  2.6 cm    1.8 - 4.0 cm  Derived Variables:  LVMI: 72 g/m2  RWT: 0.39  Fractional short: 43 %  Ejection Fraction (Modified Macedo Rule): 69 %  ------------------------------------------------------------------------  OBSERVATIONS:  Mitral Valve: Normal mitral valve. Minimal mitral  regurgitation.  Aortic Root: Normal aortic root.  Aortic Valve: Normal trileaflet aortic valve.  Left Atrium: Moderately dilated left atrium.  LA volume  index = 45 cc/m2.  Left Ventricle: Normal left ventricular systolic function.  No segmental wall motion abnormalities. Normal left  ventricular internal dimensions and wall thicknesses.  (DT:152 ms).  Right Heart: Normal right atrium. Right ventricular  enlargement with normal right ventricular systolic  function. Normal tricuspid valve.  Moderate-severe  tricuspid regurgitation. Normal pulmonic valve. Moderate  pulmonic regurgitation.  Pericardium/PleuraNormal pericardium with trace pericardial  effusion. Right pleural effusion.  Hemodynamic: Estimated right ventricular systolic pressure  equals 65 mm Hg, assuming right atrial pressure equals 10  mm Hg, consistent with severe pulmonary hypertension.  ------------------------------------------------------------------------  CONCLUSIONS:  1. Normal left ventricular systolic function. No segmental  wall motion abnormalities.  2. Right ventricular enlargement with normal right  ventricular systolic function.  3. Normal tricuspid valve. Moderate-severe tricuspid  regurgitation.  4. Estimated pulmonary artery systolic pressure equals 65  mm Hg, assuming right atrial pressure equals 10  mm Hg,  consistent with severe pulmonary hypertension.  *** Compared with echocardiogram of 2/27/2023, no  significant changes noted.    Stress Testing:    Cath:  RHC  Diagnostic Conclusions:   RA 17 mmHg. PA 58/27/41 mmHg. PCW 22 mmHg.   CO 8.79 L/min (Hgb 7.7). CI 5.33 L/min/m2.  dsc. PVR 2.16 Kaur.     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   Volume overload improved on diuretics, but now with resultant ISAMAR   1. Severe pHTN  - Hold diuresis in setting of ISAMAR  - Patient euvolemic on exam  2. HTN  - Cont Hydral 100mg q8, Amlodipine 5mg  3.  4.    Chavo Zamora MD  Fellow Physician   Cardiology Inpatient Consult Service     Please check [email or web address] password: [email or web address]" for cardiology service schedule and contact information.  Cardiology Progress Note  ------------------------------------------------------------------------------------------  SUBJECTIVE:   No events overnight. Denies CP, SOB or Palpitations.   Tele: NSR  -------------------------------------------------------------------------------------------  ROS:  CV: chest pain (-), palpitation (-), orthopnea (-), PND (-), edema (-)  PULM: SOB (-), cough (-), wheezing (-), hemoptysis (-).   CONST: fever (-), chills (-) or fatigue (-)  GI: abdominal distension (-), abdominal pain (-) , nausea/vomiting (-), hematemesis, (-), melena (-), hematochezia (-)  : dysuria (-), frequency (-), hematuria (-).   NEURO: numbness (-), weakness (-), dizziness (-)  MSK: myalgia (-), joint pain (-)   SKIN: itching (-), rash (-)  HEENT:  visual changes (-); vertigo or throat pain (-);  neck stiffness (-)   Psych: change in mood (-), anxiety (-), depression (-)     All other review of systems is negative unless indicated above.   -------------------------------------------------------------------------------------------  VS:  T(F): 98.6 (08-14), Max: 98.7 (08-14)  HR: 71 (08-14) (63 - 75)  BP: 146/64 (08-14) (139/67 - 148/66)  RR: 18 (08-14)  SpO2: 96% (08-14)  I&O's Summary    13 Aug 2023 07:01  -  14 Aug 2023 07:00  --------------------------------------------------------  IN: 720 mL / OUT: 400 mL / NET: 320 mL      ------------------------------------------------------------------------------------------  PHYSICAL EXAM:  General: No acute distress. Awake and conversant.   Eyes: Normal conjunctiva, anicteric. Round symmetric pupils.   ENT: Hearing grossly intact. No nasal discharge.   Neck: Neck is supple. No masses or thyromegaly.   Respiratory: Respirations are non-labored. No wheezing.   Skin: Warm. No rashes or ulcers.   Psych: Alert and oriented. Cooperative, Appropriate mood and affect, Normal judgment.   CV: RRR, no MRG. No lower extremity edema.   MSK: Normal ambulation. No clubbing or cyanosis.   Neuro: Sensation and CN II-XII grossly normal.  -------------------------------------------------------------------------------------------  LABS:                          7.4    5.80  )-----------( 130      ( 14 Aug 2023 05:00 )             22.6     08-14    132<L>  |  97<L>  |  64<H>  ----------------------------<  116<H>  4.8   |  21<L>  |  2.51<H>    Ca    8.8      14 Aug 2023 05:00  Phos  5.7     08-14  Mg     2.10     08-14        CARDIAC MARKERS ( 12 Aug 2023 06:49 )  51 ng/L / x     / x     / x     / x     / x      CARDIAC MARKERS ( 11 Aug 2023 19:40 )  48 ng/L / x     / x     / x     / x     / x                -------------------------------------------------------------------------------------------  Meds:  amLODIPine   Tablet 5 milliGRAM(s) Oral daily  atorvastatin 40 milliGRAM(s) Oral at bedtime  dextrose 5%. 1000 milliLiter(s) IV Continuous <Continuous>  dextrose 5%. 1000 milliLiter(s) IV Continuous <Continuous>  dextrose 50% Injectable 25 Gram(s) IV Push once  dextrose 50% Injectable 12.5 Gram(s) IV Push once  dextrose 50% Injectable 25 Gram(s) IV Push once  dextrose Oral Gel 15 Gram(s) Oral once PRN  glucagon  Injectable 1 milliGRAM(s) IntraMuscular once  guaiFENesin Oral Liquid (Sugar-Free) 200 milliGRAM(s) Oral every 6 hours PRN  heparin   Injectable 5000 Unit(s) SubCutaneous every 12 hours  hydrALAZINE 100 milliGRAM(s) Oral every 8 hours  insulin lispro (ADMELOG) corrective regimen sliding scale   SubCutaneous three times a day before meals  insulin lispro (ADMELOG) corrective regimen sliding scale   SubCutaneous at bedtime  levothyroxine 125 MICROGram(s) Oral daily  pantoprazole    Tablet 40 milliGRAM(s) Oral before breakfast  sodium bicarbonate 1300 milliGRAM(s) Oral three times a day    -------------------------------------------------------------------------------------------  Cardiovascular Diagnostic Testing:    ECG: sinus rhythm, normal axis, near low voltage limb leads, no significant ST deviations     Echo:   TTE  DIMENSIONS:  Dimensions:     Normal Values:  LA:     3.6 cm    2.0 - 4.0 cm  Ao:     2.2 cm    2.0 - 3.8 cm  SEPTUM: 0.7 cm    0.6 - 1.2 cm  PWT:    0.9 cm    0.6 - 1.1 cm  LVIDd:  4.6 cm    3.0 - 5.6 cm  LVIDs:  2.6 cm    1.8 - 4.0 cm  Derived Variables:  LVMI: 72 g/m2  RWT: 0.39  Fractional short: 43 %  Ejection Fraction (Modified Macedo Rule): 69 %  ------------------------------------------------------------------------  OBSERVATIONS:  Mitral Valve: Normal mitral valve. Minimal mitral  regurgitation.  Aortic Root: Normal aortic root.  Aortic Valve: Normal trileaflet aortic valve.  Left Atrium: Moderately dilated left atrium.  LA volume  index = 45 cc/m2.  Left Ventricle: Normal left ventricular systolic function.  No segmental wall motion abnormalities. Normal left  ventricular internal dimensions and wall thicknesses.  (DT:152 ms).  Right Heart: Normal right atrium. Right ventricular  enlargement with normal right ventricular systolic  function. Normal tricuspid valve.  Moderate-severe  tricuspid regurgitation. Normal pulmonic valve. Moderate  pulmonic regurgitation.  Pericardium/PleuraNormal pericardium with trace pericardial  effusion. Right pleural effusion.  Hemodynamic: Estimated right ventricular systolic pressure  equals 65 mm Hg, assuming right atrial pressure equals 10  mm Hg, consistent with severe pulmonary hypertension.  ------------------------------------------------------------------------  CONCLUSIONS:  1. Normal left ventricular systolic function. No segmental  wall motion abnormalities.  2. Right ventricular enlargement with normal right  ventricular systolic function.  3. Normal tricuspid valve. Moderate-severe tricuspid  regurgitation.  4. Estimated pulmonary artery systolic pressure equals 65  mm Hg, assuming right atrial pressure equals 10  mm Hg,  consistent with severe pulmonary hypertension.  *** Compared with echocardiogram of 2/27/2023, no  significant changes noted.    Stress Testing:    Cath:  RHC  Diagnostic Conclusions:   RA 17 mmHg. PA 58/27/41 mmHg. PCW 22 mmHg.   CO 8.79 L/min (Hgb 7.7). CI 5.33 L/min/m2.  dsc. PVR 2.16 Kaur.     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   Volume overload improved on diuretics, but now with resultant ISAMAR   1. Severe pHTN  - Hold diuresis in setting of ISAMAR  - Patient euvolemic on exam  2. HTN  - Cont Hydral 100mg q8, Amlodipine 5mg  3. ISAMAR  - Likely due to overdiuresis, cont to hold diuretics  - Appreciate renal recs    Chavo Zamora MD  Fellow Physician   Cardiology Inpatient Consult Service     Please check [email or web address] password: "[email or web address]" for cardiology service schedule and contact information.

## 2023-08-14 NOTE — CHART NOTE - NSCHARTNOTEFT_GEN_A_CORE
57 yo F w/ HTN, HLD, DM2, CKD (stage 3-4), hypothyroidism (c/b myxedema coma in past), severe pulmonary hypertension, mod-severe TR, asthma, presents emergency department for 5 days of worsening dyspnea on exertion after recent admission for pRBC transfusion. Volume overload improved on diuretics, but now with resultant ISAMAR.  Cardiology overnight consulted for evaluation of continued diuresis.     -SCR 1.62--> 2.09-->2.51, lactate 1.2-->0.5.     -Patient euvolemic on exam  -POCUS with evidence of b-lines (severe pulmonary hypertension) and IVC 1.66cm  -no JVD, no SOB, tolerates lying flat, on RA.    -Send VBG with lactate and BMP.  -Respirations are non-labored. No wheezing.   -Would continue to hold diuresis in setting of ISAMAR and revaluate need tomorrow. 57 yo F w/ HTN, HLD, DM2, CKD (stage 3-4), hypothyroidism (c/b myxedema coma in past), severe pulmonary hypertension, mod-severe TR, asthma, presents emergency department for 5 days of worsening dyspnea on exertion after recent admission for pRBC transfusion. Volume overload improved on diuretics, but now with resultant ISAMAR.  Cardiology overnight consulted for evaluation of continued diuresis.     -SCR 1.62--> 2.09-->2.51, lactate 1.2-->0.5.     -Patient euvolemic on exam  -POCUS with evidence of b-lines (severe pulmonary hypertension) and IVC 1.66cm  -no JVD, no SOB, tolerates lying flat, on RA.    -Please keep strict i/o  -Send VBG with lactate and BMP.  -Respirations are non-labored. No wheezing.   -Would continue to hold diuresis in setting of ISAMAR and revaluate need tomorrow.

## 2023-08-15 LAB
ANION GAP SERPL CALC-SCNC: 12 MMOL/L — SIGNIFICANT CHANGE UP (ref 7–14)
BUN SERPL-MCNC: 64 MG/DL — HIGH (ref 7–23)
CALCIUM SERPL-MCNC: 8.9 MG/DL — SIGNIFICANT CHANGE UP (ref 8.4–10.5)
CHLORIDE SERPL-SCNC: 98 MMOL/L — SIGNIFICANT CHANGE UP (ref 98–107)
CO2 SERPL-SCNC: 22 MMOL/L — SIGNIFICANT CHANGE UP (ref 22–31)
CREAT SERPL-MCNC: 2.48 MG/DL — HIGH (ref 0.5–1.3)
EGFR: 22 ML/MIN/1.73M2 — LOW
GLUCOSE BLDC GLUCOMTR-MCNC: 156 MG/DL — HIGH (ref 70–99)
GLUCOSE BLDC GLUCOMTR-MCNC: 180 MG/DL — HIGH (ref 70–99)
GLUCOSE BLDC GLUCOMTR-MCNC: 217 MG/DL — HIGH (ref 70–99)
GLUCOSE BLDC GLUCOMTR-MCNC: 258 MG/DL — HIGH (ref 70–99)
GLUCOSE SERPL-MCNC: 135 MG/DL — HIGH (ref 70–99)
HCT VFR BLD CALC: 23 % — LOW (ref 34.5–45)
HGB BLD-MCNC: 7.5 G/DL — LOW (ref 11.5–15.5)
MAGNESIUM SERPL-MCNC: 2 MG/DL — SIGNIFICANT CHANGE UP (ref 1.6–2.6)
MCHC RBC-ENTMCNC: 25.8 PG — LOW (ref 27–34)
MCHC RBC-ENTMCNC: 32.6 GM/DL — SIGNIFICANT CHANGE UP (ref 32–36)
MCV RBC AUTO: 79 FL — LOW (ref 80–100)
NRBC # BLD: 0 /100 WBCS — SIGNIFICANT CHANGE UP (ref 0–0)
NRBC # FLD: 0 K/UL — SIGNIFICANT CHANGE UP (ref 0–0)
PHOSPHATE SERPL-MCNC: 4.7 MG/DL — HIGH (ref 2.5–4.5)
PLATELET # BLD AUTO: 145 K/UL — LOW (ref 150–400)
POTASSIUM SERPL-MCNC: 4.7 MMOL/L — SIGNIFICANT CHANGE UP (ref 3.5–5.3)
POTASSIUM SERPL-SCNC: 4.7 MMOL/L — SIGNIFICANT CHANGE UP (ref 3.5–5.3)
RBC # BLD: 2.91 M/UL — LOW (ref 3.8–5.2)
RBC # FLD: 13.7 % — SIGNIFICANT CHANGE UP (ref 10.3–14.5)
SODIUM SERPL-SCNC: 132 MMOL/L — LOW (ref 135–145)
WBC # BLD: 5.15 K/UL — SIGNIFICANT CHANGE UP (ref 3.8–10.5)
WBC # FLD AUTO: 5.15 K/UL — SIGNIFICANT CHANGE UP (ref 3.8–10.5)

## 2023-08-15 PROCEDURE — 99232 SBSQ HOSP IP/OBS MODERATE 35: CPT

## 2023-08-15 PROCEDURE — 99233 SBSQ HOSP IP/OBS HIGH 50: CPT

## 2023-08-15 RX ADMIN — Medication 2: at 12:53

## 2023-08-15 RX ADMIN — PANTOPRAZOLE SODIUM 40 MILLIGRAM(S): 20 TABLET, DELAYED RELEASE ORAL at 05:50

## 2023-08-15 RX ADMIN — Medication 1300 MILLIGRAM(S): at 13:25

## 2023-08-15 RX ADMIN — Medication 1300 MILLIGRAM(S): at 05:49

## 2023-08-15 RX ADMIN — Medication 100 MILLIGRAM(S): at 13:25

## 2023-08-15 RX ADMIN — HEPARIN SODIUM 5000 UNIT(S): 5000 INJECTION INTRAVENOUS; SUBCUTANEOUS at 17:16

## 2023-08-15 RX ADMIN — AMLODIPINE BESYLATE 5 MILLIGRAM(S): 2.5 TABLET ORAL at 05:50

## 2023-08-15 RX ADMIN — Medication 1: at 08:50

## 2023-08-15 RX ADMIN — Medication 125 MICROGRAM(S): at 05:49

## 2023-08-15 RX ADMIN — Medication 1: at 17:16

## 2023-08-15 RX ADMIN — Medication 100 MILLIGRAM(S): at 22:07

## 2023-08-15 RX ADMIN — Medication 100 MILLIGRAM(S): at 05:50

## 2023-08-15 RX ADMIN — Medication 1: at 22:14

## 2023-08-15 RX ADMIN — HEPARIN SODIUM 5000 UNIT(S): 5000 INJECTION INTRAVENOUS; SUBCUTANEOUS at 06:05

## 2023-08-15 RX ADMIN — ATORVASTATIN CALCIUM 40 MILLIGRAM(S): 80 TABLET, FILM COATED ORAL at 22:06

## 2023-08-15 RX ADMIN — Medication 1300 MILLIGRAM(S): at 22:07

## 2023-08-15 NOTE — PROGRESS NOTE ADULT - SUBJECTIVE AND OBJECTIVE BOX
[facility] Division of Kidney Diseases & Hypertension  FOLLOW UP NOTE  [phone]--------------------------------------------------------------------------------  Chief Complaint:Heart failure    HPI: 58 y.o F w/ PMhx of HTN, DM, CKD (baseline Cr 2), hypothyroidism, pulmonary HTN here with SOB and found to be in decompensated heart failure exacerbation. Patient was recently discharged on 8/7 after she had an admission for a similar reason and improved with IV diuresis. Nephrology consulted for rising serum creatinine, Cr of 2.47. CXR with diffuse haziness, worse than before and BNP of ~6K vs 5K on 8/5. Initially needed bipap, now off.  Follows with Dr. Coyle outpatient.     24 hour events/subjective: Pt seen and examined at bedside. Reports feeling well, SOB has resolved. Able to lie flat and is back to baseline. Denies fevers, HA, SOB, CP, n/v, edema.     PAST HISTORY  --------------------------------------------------------------------------------  No significant changes to PMH, PSH, FHx, SHx, unless otherwise noted    ALLERGIES & MEDICATIONS  --------------------------------------------------------------------------------  Allergies  No Known Allergies  Intolerances    Standing Inpatient Medications  amLODIPine   Tablet 5 milliGRAM(s) Oral daily  atorvastatin 40 milliGRAM(s) Oral at bedtime  dextrose 5%. 1000 milliLiter(s) IV Continuous <Continuous>  dextrose 5%. 1000 milliLiter(s) IV Continuous <Continuous>  dextrose 50% Injectable 25 Gram(s) IV Push once  dextrose 50% Injectable 12.5 Gram(s) IV Push once  dextrose 50% Injectable 25 Gram(s) IV Push once  glucagon  Injectable 1 milliGRAM(s) IntraMuscular once  heparin   Injectable 5000 Unit(s) SubCutaneous every 12 hours  hydrALAZINE 100 milliGRAM(s) Oral every 8 hours  insulin lispro (ADMELOG) corrective regimen sliding scale   SubCutaneous three times a day before meals  insulin lispro (ADMELOG) corrective regimen sliding scale   SubCutaneous at bedtime  levothyroxine 125 MICROGram(s) Oral daily  pantoprazole    Tablet 40 milliGRAM(s) Oral before breakfast  sodium bicarbonate 1300 milliGRAM(s) Oral three times a day  PRN Inpatient Medications  dextrose Oral Gel 15 Gram(s) Oral once PRN  guaiFENesin Oral Liquid (Sugar-Free) 200 milliGRAM(s) Oral every 6 hours PRN    REVIEW OF SYSTEMS  --------------------------------------------------------------------------------  see above    VITALS/PHYSICAL EXAM  --------------------------------------------------------------------------------  T(C): 36.4 (08-15-23 @ 09:50), Max: 36.8 (08-14-23 @ 17:00)  HR: 67 (08-15-23 @ 09:50) (62 - 71)  BP: 146/70 (08-15-23 @ 09:50) (136/66 - 154/74)  RR: 18 (08-15-23 @ 09:50) (18 - 18)  SpO2: 97% (08-15-23 @ 09:50) (97% - 98%)  Wt(kg): --    08-14-23 @ 07:01  -  08-15-23 @ 07:00  --------------------------------------------------------  IN: 947 mL / OUT: 0 mL / NET: 947 mL    Physical Exam:  Gen: NAD; sitting up and watching TV   HEENT: Anicteric  Pulm: CTAB  CV: S1S2+  Abd: Soft, +BS    Ext: No LE edema B/L  Neuro: Awake  Skin: Warm and dry    LABS/STUDIES  --------------------------------------------------------------------------------              7.5    5.15  >-----------<  145      [08-15-23 @ 05:40]              23.0     132  |  98  |  64  ----------------------------<  135      [08-15-23 @ 05:40]  4.7   |  22  |  2.48        Ca     8.9     [08-15-23 @ 05:40]      Mg     2.00     [08-15-23 @ 05:40]      Phos  4.7     [08-15-23 @ 05:40]    Creatinine Trend:  SCr 2.48 [08-15 @ 05:40]  SCr 2.50 [08-14 @ 20:47]  SCr 2.51 [08-14 @ 05:00]  SCr 2.46 [08-13 @ 18:24]  SCr 2.47 [08-13 @ 06:31]    Urine Creatinine 46      [08-14-23 @ 00:05]  Urine Protein 183      [08-14-23 @ 00:05]    TSH 4.82      [08-12-23 @ 00:43]

## 2023-08-15 NOTE — PROGRESS NOTE ADULT - PROBLEM SELECTOR PLAN 1
Pt with ISAMAR on CKD likely in setting of HF exacerbation. Per [facility], baseline Scr ~2, admitted with Scr of 1.62, now increased to 2.48. At home, she was on bumex 1 mg PO BID, given 40 IV lasix on admission, then bumex 2mg IV BID on 8/12 and in am on 8/13.  Has a lung etiology also contributing to her SOB. She was admitted 8/4-8/7 with SOB/volume overload after a pRBC transfusion and may not have been completely optimized prior to DC on 8/11. Pt currently euvolemic, saturating well on RA. Case discussed with cardiology team. Agree to hold diuretic therapy for now. Consider right heart cath. Monitor BMP and I/O. Dose meds per eGFR and avoid nephrotoxic agents.

## 2023-08-15 NOTE — PROGRESS NOTE ADULT - SUBJECTIVE AND OBJECTIVE BOX
[facility] Division of [facility] Medicine  [place] Saeed   Available via MS Teams  In house pager [phone]    SUBJECTIVE / OVERNIGHT EVENTS:  No acute events overnight. Denies acute complaints. Really hoping to be discharged.    ADDITIONAL REVIEW OF SYSTEMS:    MEDICATIONS  (STANDING):  amLODIPine   Tablet 5 milliGRAM(s) Oral daily  atorvastatin 40 milliGRAM(s) Oral at bedtime  dextrose 5%. 1000 milliLiter(s) (100 mL/Hr) IV Continuous <Continuous>  dextrose 5%. 1000 milliLiter(s) (50 mL/Hr) IV Continuous <Continuous>  dextrose 50% Injectable 25 Gram(s) IV Push once  dextrose 50% Injectable 12.5 Gram(s) IV Push once  dextrose 50% Injectable 25 Gram(s) IV Push once  glucagon  Injectable 1 milliGRAM(s) IntraMuscular once  heparin   Injectable 5000 Unit(s) SubCutaneous every 12 hours  hydrALAZINE 100 milliGRAM(s) Oral every 8 hours  insulin lispro (ADMELOG) corrective regimen sliding scale   SubCutaneous three times a day before meals  insulin lispro (ADMELOG) corrective regimen sliding scale   SubCutaneous at bedtime  levothyroxine 125 MICROGram(s) Oral daily  pantoprazole    Tablet 40 milliGRAM(s) Oral before breakfast  sodium bicarbonate 1300 milliGRAM(s) Oral three times a day    MEDICATIONS  (PRN):  dextrose Oral Gel 15 Gram(s) Oral once PRN Blood Glucose LESS THAN 70 milliGRAM(s)/deciliter  guaiFENesin Oral Liquid (Sugar-Free) 200 milliGRAM(s) Oral every 6 hours PRN Cough      I&O's Summary    14 Aug 2023 07:01  -  15 Aug 2023 07:00  --------------------------------------------------------  IN: 947 mL / OUT: 0 mL / NET: 947 mL        PHYSICAL EXAM:  Vital Signs Last 24 Hrs  T(C): 36.4 (15 Aug 2023 09:50), Max: 36.8 (14 Aug 2023 17:00)  T(F): 97.5 (15 Aug 2023 09:50), Max: 98.3 (14 Aug 2023 17:00)  HR: 67 (15 Aug 2023 09:50) (62 - 71)  BP: 146/70 (15 Aug 2023 09:50) (136/66 - 154/74)  BP(mean): --  RR: 18 (15 Aug 2023 09:50) (18 - 18)  SpO2: 97% (15 Aug 2023 09:50) (97% - 98%)    Parameters below as of 15 Aug 2023 09:50  Patient On (Oxygen Delivery Method): room air      CONSTITUTIONAL: NAD, well-developed, well-groomed  EYES: PERRLA; conjunctiva and sclera clear  ENMT: Moist oral mucosa, no pharyngeal injection or exudates; normal dentition  NECK: Supple, no palpable masses; no thyromegaly  RESPIRATORY: Normal respiratory effort; mild wheezing b/l upper lungs anteriorly  CARDIOVASCULAR: Regular rate and rhythm, normal S1 and S2, no murmur/rub/gallop; No lower extremity edema; Peripheral pulses are 2+ bilaterally  ABDOMEN: Nontender to palpation, normoactive bowel sounds, no rebound/guarding; No hepatosplenomegaly  MUSCULOSKELETAL:  no clubbing or cyanosis of digits; no joint swelling or tenderness to palpation  PSYCH: A+O to person, place, and time; affect appropriate  NEUROLOGY: CN 2-12 are intact and symmetric; no gross sensory deficits   SKIN: No rashes; no palpable lesions    LABS:                        7.5    5.15  )-----------( 145      ( 15 Aug 2023 05:40 )             23.0     08-15    132<L>  |  98  |  64<H>  ----------------------------<  135<H>  4.7   |  22  |  2.48<H>    Ca    8.9      15 Aug 2023 05:40  Phos  4.7     08-15  Mg     2.00     08-15            Urinalysis Basic - ( 15 Aug 2023 05:40 )    Color: x / Appearance: x / SG: x / pH: x  Gluc: 135 mg/dL / Ketone: x  / Bili: x / Urobili: x   Blood: x / Protein: x / Nitrite: x   Leuk Esterase: x / RBC: x / WBC x   Sq Epi: x / Non Sq Epi: x / Bacteria: x        SARS-CoV-2: NotDetec (11 Aug 2023 21:46)  SARS-CoV-2: Abel (22 Jun 2023 05:10)

## 2023-08-15 NOTE — PROGRESS NOTE ADULT - PROBLEM SELECTOR PLAN 2
Patient with elevated potassium on admission. Now normalized. Continue with home sodium bicarbonate, as they are needed for long term kidney protection in patients with CKD, target serum bicarb>22. Sodium bicarb tabs can also help with potassium control. Monitor serum potassium. Ensure low potassium/renal diet.     Final recommendations pending attending signature.    If you have any questions, please feel free to contact me  Chay Koehler  Nephrology Fellow  [employer]/Page [phone]  (After 5pm or on weekends please page the on-call fellow)

## 2023-08-15 NOTE — PROGRESS NOTE ADULT - SUBJECTIVE AND OBJECTIVE BOX
Cardiology Progress Note  ------------------------------------------------------------------------------------------  SUBJECTIVE:   No events overnight. Denies CP, SOB or Palpitations.   -------------------------------------------------------------------------------------------  ROS:  CV: chest pain (-), palpitation (-), orthopnea (-), PND (-), edema (-)  PULM: SOB (-), cough (-), wheezing (-), hemoptysis (-).   CONST: fever (-), chills (-) or fatigue (-)  GI: abdominal distension (-), abdominal pain (-) , nausea/vomiting (-), hematemesis, (-), melena (-), hematochezia (-)  : dysuria (-), frequency (-), hematuria (-).   NEURO: numbness (-), weakness (-), dizziness (-)  MSK: myalgia (-), joint pain (-)   SKIN: itching (-), rash (-)  HEENT:  visual changes (-); vertigo or throat pain (-);  neck stiffness (-)   Psych: change in mood (-), anxiety (-), depression (-)     All other review of systems is negative unless indicated above.   -------------------------------------------------------------------------------------------  VS:  T(F): 97.7 (08-15), Max: 98.3 (08-14)  HR: 70 (08-15) (62 - 71)  BP: 152/72 (08-15) (136/66 - 154/74)  RR: 18 (08-15)  SpO2: 97% (08-15)  I&O's Summary    14 Aug 2023 07:01  -  15 Aug 2023 07:00  --------------------------------------------------------  IN: 947 mL / OUT: 0 mL / NET: 947 mL      ------------------------------------------------------------------------------------------  PHYSICAL EXAM:  General: No acute distress. Awake and conversant.   Eyes: Normal conjunctiva, anicteric. Round symmetric pupils.   ENT: Hearing grossly intact. No nasal discharge.   Neck: Neck is supple. No masses or thyromegaly.   Respiratory: Respirations are non-labored. No wheezing.   Skin: Warm. No rashes or ulcers.   Psych: Alert and oriented. Cooperative, Appropriate mood and affect, Normal judgment.   CV: No lower extremity edema.   MSK: Normal ambulation. No clubbing or cyanosis.   Neuro: Sensation and CN II-XII grossly normal.  -------------------------------------------------------------------------------------------  LABS:                          7.5    5.15  )-----------( 145      ( 15 Aug 2023 05:40 )             23.0     08-15    132<L>  |  98  |  64<H>  ----------------------------<  135<H>  4.7   |  22  |  2.48<H>    Ca    8.9      15 Aug 2023 05:40  Phos  4.7     08-15  Mg     2.00     08-15        CARDIAC MARKERS ( 12 Aug 2023 06:49 )  51 ng/L / x     / x     / x     / x     / x      CARDIAC MARKERS ( 11 Aug 2023 19:40 )  48 ng/L / x     / x     / x     / x     / x                -------------------------------------------------------------------------------------------  Meds:  amLODIPine   Tablet 5 milliGRAM(s) Oral daily  atorvastatin 40 milliGRAM(s) Oral at bedtime  dextrose 5%. 1000 milliLiter(s) IV Continuous <Continuous>  dextrose 5%. 1000 milliLiter(s) IV Continuous <Continuous>  dextrose 50% Injectable 25 Gram(s) IV Push once  dextrose 50% Injectable 12.5 Gram(s) IV Push once  dextrose 50% Injectable 25 Gram(s) IV Push once  dextrose Oral Gel 15 Gram(s) Oral once PRN  glucagon  Injectable 1 milliGRAM(s) IntraMuscular once  guaiFENesin Oral Liquid (Sugar-Free) 200 milliGRAM(s) Oral every 6 hours PRN  heparin   Injectable 5000 Unit(s) SubCutaneous every 12 hours  hydrALAZINE 100 milliGRAM(s) Oral every 8 hours  insulin lispro (ADMELOG) corrective regimen sliding scale   SubCutaneous three times a day before meals  insulin lispro (ADMELOG) corrective regimen sliding scale   SubCutaneous at bedtime  levothyroxine 125 MICROGram(s) Oral daily  pantoprazole    Tablet 40 milliGRAM(s) Oral before breakfast  sodium bicarbonate 1300 milliGRAM(s) Oral three times a day    -------------------------------------------------------------------------------------------  Cardiovascular Diagnostic Testing:    ECG: sinus rhythm, normal axis, near low voltage limb leads, no significant ST deviations     Echo:   TTE  DIMENSIONS:  Dimensions:     Normal Values:  LA:     3.6 cm    2.0 - 4.0 cm  Ao:     2.2 cm    2.0 - 3.8 cm  SEPTUM: 0.7 cm    0.6 - 1.2 cm  PWT:    0.9 cm    0.6 - 1.1 cm  LVIDd:  4.6 cm    3.0 - 5.6 cm  LVIDs:  2.6 cm    1.8 - 4.0 cm  Derived Variables:  LVMI: 72 g/m2  RWT: 0.39  Fractional short: 43 %  Ejection Fraction (Modified Macedo Rule): 69 %  ------------------------------------------------------------------------  OBSERVATIONS:  Mitral Valve: Normal mitral valve. Minimal mitral  regurgitation.  Aortic Root: Normal aortic root.  Aortic Valve: Normal trileaflet aortic valve.  Left Atrium: Moderately dilated left atrium.  LA volume  index = 45 cc/m2.  Left Ventricle: Normal left ventricular systolic function.  No segmental wall motion abnormalities. Normal left  ventricular internal dimensions and wall thicknesses.  (DT:152 ms).  Right Heart: Normal right atrium. Right ventricular  enlargement with normal right ventricular systolic  function. Normal tricuspid valve.  Moderate-severe  tricuspid regurgitation. Normal pulmonic valve. Moderate  pulmonic regurgitation.  Pericardium/PleuraNormal pericardium with trace pericardial  effusion. Right pleural effusion.  Hemodynamic: Estimated right ventricular systolic pressure  equals 65 mm Hg, assuming right atrial pressure equals 10  mm Hg, consistent with severe pulmonary hypertension.  ------------------------------------------------------------------------  CONCLUSIONS:  1. Normal left ventricular systolic function. No segmental  wall motion abnormalities.  2. Right ventricular enlargement with normal right  ventricular systolic function.  3. Normal tricuspid valve. Moderate-severe tricuspid  regurgitation.  4. Estimated pulmonary artery systolic pressure equals 65  mm Hg, assuming right atrial pressure equals 10  mm Hg,  consistent with severe pulmonary hypertension.  *** Compared with echocardiogram of 2/27/2023, no  significant changes noted.    Stress Testing:    Cath:  RHC  Diagnostic Conclusions:   RA 17 mmHg. PA 58/27/41 mmHg. PCW 22 mmHg.   CO 8.79 L/min (Hgb 7.7). CI 5.33 L/min/m2.  dsc. PVR 2.16 Kaur.       Imaging:    CXR:  reviewed  -------------------------------------------------------------------------------------------  Assessment and Plan: 59 yo F w/ HTN, HLD, DM2, CKD (stage 3-4), hypothyroidism (c/b myxedema coma in past), severe pulmonary hypertension, mod-severe TR, asthma, presents emergency department for 5 days of worsening dyspnea on exertion after recent admission for pRBC transfusion.   Volume overload improved on diuretics, but now with resultant ISAMAR   1. Severe pHTN  - Hold diuresis in setting of ISAMAR  - Patient euvolemic on exam  2. HTN  - Cont Hydral 100mg q8, Amlodipine 5mg  3. ISAMAR  - Likely due to overdiuresis, cont to hold diuretics  - Appreciate renal recs    Chavo Zamora MD  Fellow Physician   Cardiology Inpatient Consult Service     Please check [email or web address]: [email or web address] for cardiology service schedule and contact information.  Cardiology Progress Note  ------------------------------------------------------------------------------------------  SUBJECTIVE:   No events overnight. Denies CP, SOB or Palpitations.   -------------------------------------------------------------------------------------------  ROS:  CV: chest pain (-), palpitation (-), orthopnea (-), PND (-), edema (-)  PULM: SOB (-), cough (-), wheezing (-), hemoptysis (-).   CONST: fever (-), chills (-) or fatigue (-)  GI: abdominal distension (-), abdominal pain (-) , nausea/vomiting (-), hematemesis, (-), melena (-), hematochezia (-)  : dysuria (-), frequency (-), hematuria (-).   NEURO: numbness (-), weakness (-), dizziness (-)  MSK: myalgia (-), joint pain (-)   SKIN: itching (-), rash (-)  HEENT:  visual changes (-); vertigo or throat pain (-);  neck stiffness (-)   Psych: change in mood (-), anxiety (-), depression (-)     All other review of systems is negative unless indicated above.   -------------------------------------------------------------------------------------------  VS:  T(F): 97.7 (08-15), Max: 98.3 (08-14)  HR: 70 (08-15) (62 - 71)  BP: 152/72 (08-15) (136/66 - 154/74)  RR: 18 (08-15)  SpO2: 97% (08-15)  I&O's Summary    14 Aug 2023 07:01  -  15 Aug 2023 07:00  --------------------------------------------------------  IN: 947 mL / OUT: 0 mL / NET: 947 mL      ------------------------------------------------------------------------------------------  PHYSICAL EXAM:  General: No acute distress. Awake and conversant.   Eyes: Normal conjunctiva, anicteric. Round symmetric pupils.   ENT: Hearing grossly intact. No nasal discharge.   Neck: Neck is supple. No masses or thyromegaly.   Respiratory: Respirations are non-labored. No wheezing.   Skin: Warm. No rashes or ulcers.   Psych: Alert and oriented. Cooperative, Appropriate mood and affect, Normal judgment.   CV: No lower extremity edema.   MSK: Normal ambulation. No clubbing or cyanosis.   Neuro: Sensation and CN II-XII grossly normal.  -------------------------------------------------------------------------------------------  LABS:                          7.5    5.15  )-----------( 145      ( 15 Aug 2023 05:40 )             23.0     08-15    132<L>  |  98  |  64<H>  ----------------------------<  135<H>  4.7   |  22  |  2.48<H>    Ca    8.9      15 Aug 2023 05:40  Phos  4.7     08-15  Mg     2.00     08-15        CARDIAC MARKERS ( 12 Aug 2023 06:49 )  51 ng/L / x     / x     / x     / x     / x      CARDIAC MARKERS ( 11 Aug 2023 19:40 )  48 ng/L / x     / x     / x     / x     / x                -------------------------------------------------------------------------------------------  Meds:  amLODIPine   Tablet 5 milliGRAM(s) Oral daily  atorvastatin 40 milliGRAM(s) Oral at bedtime  dextrose 5%. 1000 milliLiter(s) IV Continuous <Continuous>  dextrose 5%. 1000 milliLiter(s) IV Continuous <Continuous>  dextrose 50% Injectable 25 Gram(s) IV Push once  dextrose 50% Injectable 12.5 Gram(s) IV Push once  dextrose 50% Injectable 25 Gram(s) IV Push once  dextrose Oral Gel 15 Gram(s) Oral once PRN  glucagon  Injectable 1 milliGRAM(s) IntraMuscular once  guaiFENesin Oral Liquid (Sugar-Free) 200 milliGRAM(s) Oral every 6 hours PRN  heparin   Injectable 5000 Unit(s) SubCutaneous every 12 hours  hydrALAZINE 100 milliGRAM(s) Oral every 8 hours  insulin lispro (ADMELOG) corrective regimen sliding scale   SubCutaneous three times a day before meals  insulin lispro (ADMELOG) corrective regimen sliding scale   SubCutaneous at bedtime  levothyroxine 125 MICROGram(s) Oral daily  pantoprazole    Tablet 40 milliGRAM(s) Oral before breakfast  sodium bicarbonate 1300 milliGRAM(s) Oral three times a day    -------------------------------------------------------------------------------------------  Cardiovascular Diagnostic Testing:    ECG: sinus rhythm, normal axis, near low voltage limb leads, no significant ST deviations     Echo:   TTE  DIMENSIONS:  Dimensions:     Normal Values:  LA:     3.6 cm    2.0 - 4.0 cm  Ao:     2.2 cm    2.0 - 3.8 cm  SEPTUM: 0.7 cm    0.6 - 1.2 cm  PWT:    0.9 cm    0.6 - 1.1 cm  LVIDd:  4.6 cm    3.0 - 5.6 cm  LVIDs:  2.6 cm    1.8 - 4.0 cm  Derived Variables:  LVMI: 72 g/m2  RWT: 0.39  Fractional short: 43 %  Ejection Fraction (Modified Macedo Rule): 69 %  ------------------------------------------------------------------------  OBSERVATIONS:  Mitral Valve: Normal mitral valve. Minimal mitral  regurgitation.  Aortic Root: Normal aortic root.  Aortic Valve: Normal trileaflet aortic valve.  Left Atrium: Moderately dilated left atrium.  LA volume  index = 45 cc/m2.  Left Ventricle: Normal left ventricular systolic function.  No segmental wall motion abnormalities. Normal left  ventricular internal dimensions and wall thicknesses.  (DT:152 ms).  Right Heart: Normal right atrium. Right ventricular  enlargement with normal right ventricular systolic  function. Normal tricuspid valve.  Moderate-severe  tricuspid regurgitation. Normal pulmonic valve. Moderate  pulmonic regurgitation.  Pericardium/PleuraNormal pericardium with trace pericardial  effusion. Right pleural effusion.  Hemodynamic: Estimated right ventricular systolic pressure  equals 65 mm Hg, assuming right atrial pressure equals 10  mm Hg, consistent with severe pulmonary hypertension.  ------------------------------------------------------------------------  CONCLUSIONS:  1. Normal left ventricular systolic function. No segmental  wall motion abnormalities.  2. Right ventricular enlargement with normal right  ventricular systolic function.  3. Normal tricuspid valve. Moderate-severe tricuspid  regurgitation.  4. Estimated pulmonary artery systolic pressure equals 65  mm Hg, assuming right atrial pressure equals 10  mm Hg,  consistent with severe pulmonary hypertension.  *** Compared with echocardiogram of 2/27/2023, no  significant changes noted.    Stress Testing:    Cath:  RHC  Diagnostic Conclusions:   RA 17 mmHg. PA 58/27/41 mmHg. PCW 22 mmHg.   CO 8.79 L/min (Hgb 7.7). CI 5.33 L/min/m2.  dsc. PVR 2.16 Kaur.       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   Volume overload improved on diuretics, but now with resultant ISAMAR   1. Severe pHTN  - Ok with resuming diuresis per renal recs  - Patient euvolemic on exam  - No further inpatient cardiac workup, needs outpatient stress on dc  2. HTN  - Cont Hydral 100mg q8, Amlodipine 5mg  3. ISAMAR  - Appreciate renal recs, ok with resuming diuresis    Chavo Zamora MD  Fellow Physician   Cardiology Inpatient Consult Service     Please check [email or web address]: [email or web address] for cardiology service schedule and contact information.

## 2023-08-15 NOTE — PROGRESS NOTE ADULT - PROBLEM SELECTOR PLAN 1
- Home regimen PO bumex 1mg PO BID  - BNP 6228, CXR with persistent small L pleural effusion but appears to be more hazy than CXR 8/4.  - TTE 8/6/23 with LVEF 69%, mod-sev TR, severe pulmonary HTN  - Weaned off BIPAP -> now on O2  - discontinued Bumex 2mg BID IV for now, will likely restart PO on discharge, pending cardio/renal recs   - Given the discrepancy between LV function and class 2 pulm HTN, consulted cardio - appreciate input  - Strict I/O, daily weight. Pt has had multiple admissions for CHF, please document dry weight upon discharge    Repeat CXR 8/14 shows mild b/l pleural effusions. Mild wheezing on auscultation.  Cardiology agreeable to restarting diuresis, if recommended by nephrology. Cr stable at this time around 2.5.

## 2023-08-16 LAB
ANION GAP SERPL CALC-SCNC: 12 MMOL/L — SIGNIFICANT CHANGE UP (ref 7–14)
BLD GP AB SCN SERPL QL: NEGATIVE — SIGNIFICANT CHANGE UP
BUN SERPL-MCNC: 66 MG/DL — HIGH (ref 7–23)
CALCIUM SERPL-MCNC: 9 MG/DL — SIGNIFICANT CHANGE UP (ref 8.4–10.5)
CHLORIDE SERPL-SCNC: 98 MMOL/L — SIGNIFICANT CHANGE UP (ref 98–107)
CO2 SERPL-SCNC: 22 MMOL/L — SIGNIFICANT CHANGE UP (ref 22–31)
CREAT SERPL-MCNC: 2.38 MG/DL — HIGH (ref 0.5–1.3)
EGFR: 23 ML/MIN/1.73M2 — LOW
GLUCOSE BLDC GLUCOMTR-MCNC: 155 MG/DL — HIGH (ref 70–99)
GLUCOSE BLDC GLUCOMTR-MCNC: 224 MG/DL — HIGH (ref 70–99)
GLUCOSE BLDC GLUCOMTR-MCNC: 259 MG/DL — HIGH (ref 70–99)
GLUCOSE BLDC GLUCOMTR-MCNC: 276 MG/DL — HIGH (ref 70–99)
GLUCOSE SERPL-MCNC: 142 MG/DL — HIGH (ref 70–99)
HCT VFR BLD CALC: 20.4 % — CRITICAL LOW (ref 34.5–45)
HCT VFR BLD CALC: 22.5 % — LOW (ref 34.5–45)
HGB BLD-MCNC: 7 G/DL — CRITICAL LOW (ref 11.5–15.5)
HGB BLD-MCNC: 7.2 G/DL — LOW (ref 11.5–15.5)
MAGNESIUM SERPL-MCNC: 2 MG/DL — SIGNIFICANT CHANGE UP (ref 1.6–2.6)
MCHC RBC-ENTMCNC: 25.3 PG — LOW (ref 27–34)
MCHC RBC-ENTMCNC: 26.8 PG — LOW (ref 27–34)
MCHC RBC-ENTMCNC: 32 GM/DL — SIGNIFICANT CHANGE UP (ref 32–36)
MCHC RBC-ENTMCNC: 34.3 GM/DL — SIGNIFICANT CHANGE UP (ref 32–36)
MCV RBC AUTO: 78.2 FL — LOW (ref 80–100)
MCV RBC AUTO: 78.9 FL — LOW (ref 80–100)
NRBC # BLD: 0 /100 WBCS — SIGNIFICANT CHANGE UP (ref 0–0)
NRBC # BLD: 0 /100 WBCS — SIGNIFICANT CHANGE UP (ref 0–0)
NRBC # FLD: 0 K/UL — SIGNIFICANT CHANGE UP (ref 0–0)
NRBC # FLD: 0 K/UL — SIGNIFICANT CHANGE UP (ref 0–0)
PHOSPHATE SERPL-MCNC: 4.7 MG/DL — HIGH (ref 2.5–4.5)
PLATELET # BLD AUTO: 140 K/UL — LOW (ref 150–400)
PLATELET # BLD AUTO: 141 K/UL — LOW (ref 150–400)
POTASSIUM SERPL-MCNC: 4.9 MMOL/L — SIGNIFICANT CHANGE UP (ref 3.5–5.3)
POTASSIUM SERPL-SCNC: 4.9 MMOL/L — SIGNIFICANT CHANGE UP (ref 3.5–5.3)
RBC # BLD: 2.61 M/UL — LOW (ref 3.8–5.2)
RBC # BLD: 2.85 M/UL — LOW (ref 3.8–5.2)
RBC # FLD: 13.6 % — SIGNIFICANT CHANGE UP (ref 10.3–14.5)
RBC # FLD: 13.7 % — SIGNIFICANT CHANGE UP (ref 10.3–14.5)
RH IG SCN BLD-IMP: POSITIVE — SIGNIFICANT CHANGE UP
SODIUM SERPL-SCNC: 132 MMOL/L — LOW (ref 135–145)
WBC # BLD: 6.25 K/UL — SIGNIFICANT CHANGE UP (ref 3.8–10.5)
WBC # BLD: 6.55 K/UL — SIGNIFICANT CHANGE UP (ref 3.8–10.5)
WBC # FLD AUTO: 6.25 K/UL — SIGNIFICANT CHANGE UP (ref 3.8–10.5)
WBC # FLD AUTO: 6.55 K/UL — SIGNIFICANT CHANGE UP (ref 3.8–10.5)

## 2023-08-16 PROCEDURE — 99233 SBSQ HOSP IP/OBS HIGH 50: CPT

## 2023-08-16 PROCEDURE — 99232 SBSQ HOSP IP/OBS MODERATE 35: CPT

## 2023-08-16 PROCEDURE — 99235 HOSP IP/OBS SAME DATE MOD 70: CPT

## 2023-08-16 PROCEDURE — 99233 SBSQ HOSP IP/OBS HIGH 50: CPT | Mod: GC

## 2023-08-16 RX ORDER — BUMETANIDE 0.25 MG/ML
1 INJECTION INTRAMUSCULAR; INTRAVENOUS DAILY
Refills: 0 | Status: DISCONTINUED | OUTPATIENT
Start: 2023-08-16 | End: 2023-08-18

## 2023-08-16 RX ORDER — DARBEPOETIN ALFA IN POLYSORBAT 200MCG/0.4
60 PEN INJECTOR (ML) SUBCUTANEOUS ONCE
Refills: 0 | Status: COMPLETED | OUTPATIENT
Start: 2023-08-16 | End: 2023-08-26

## 2023-08-16 RX ADMIN — AMLODIPINE BESYLATE 5 MILLIGRAM(S): 2.5 TABLET ORAL at 05:37

## 2023-08-16 RX ADMIN — Medication 3: at 17:41

## 2023-08-16 RX ADMIN — Medication 1: at 21:50

## 2023-08-16 RX ADMIN — ATORVASTATIN CALCIUM 40 MILLIGRAM(S): 80 TABLET, FILM COATED ORAL at 21:50

## 2023-08-16 RX ADMIN — Medication 125 MICROGRAM(S): at 05:38

## 2023-08-16 RX ADMIN — Medication 1: at 08:47

## 2023-08-16 RX ADMIN — Medication 100 MILLIGRAM(S): at 21:50

## 2023-08-16 RX ADMIN — PANTOPRAZOLE SODIUM 40 MILLIGRAM(S): 20 TABLET, DELAYED RELEASE ORAL at 06:58

## 2023-08-16 RX ADMIN — Medication 1300 MILLIGRAM(S): at 21:50

## 2023-08-16 RX ADMIN — HEPARIN SODIUM 5000 UNIT(S): 5000 INJECTION INTRAVENOUS; SUBCUTANEOUS at 17:42

## 2023-08-16 RX ADMIN — HEPARIN SODIUM 5000 UNIT(S): 5000 INJECTION INTRAVENOUS; SUBCUTANEOUS at 05:38

## 2023-08-16 RX ADMIN — Medication 1300 MILLIGRAM(S): at 05:37

## 2023-08-16 RX ADMIN — Medication 100 MILLIGRAM(S): at 05:37

## 2023-08-16 RX ADMIN — Medication 1300 MILLIGRAM(S): at 14:32

## 2023-08-16 RX ADMIN — Medication 100 MILLIGRAM(S): at 14:32

## 2023-08-16 RX ADMIN — Medication 2: at 12:00

## 2023-08-16 NOTE — PROGRESS NOTE ADULT - PROBLEM SELECTOR PLAN 3
Anemia of chronic disease and thrombocytopenia. Iron studies reviewed, Tsat 25% Ferritin 753. Recommend hematology evaluation. Monitor Hgb, transfusion per primary team.    Final recommendations pending attending signature.    If you have any questions, please feel free to contact me  Chay Koehler  Nephrology Fellow  [employer]/Page [phone]  (After 5pm or on weekends please page the on-call fellow) Anemia of chronic disease and thrombocytopenia. Iron studies reviewed, Tsat 25% Ferritin 753. Will dose DEA today 8/16. Recommend hematology evaluation. Recommend age appropriate screening. Monitor Hgb, transfusion per primary team.    Final recommendations pending attending signature.    If you have any questions, please feel free to contact me  Chay Koehler  Nephrology Fellow  [email or web address]/Page [phone]  (After 5pm or on weekends please page the on-call fellow)

## 2023-08-16 NOTE — PATIENT PROFILE ADULT - NSPROSPHOSPCHAPLAINYN_GEN_A_NUR
[de-identified] : 70 year old female with history of hypertension, hypothyroidism, hyperlipidemia presents for annual exam.  Overall doing okay.  No acute issues.  Has been actively working on healthier diet.  Recently started on statin   recently retired  , 2 adult children  non-smoker      
no

## 2023-08-16 NOTE — PROGRESS NOTE ADULT - PROBLEM SELECTOR PLAN 1
Pt with ISAMAR on CKD likely in setting of HF exacerbation. Per [facility], baseline Scr ~2, admitted with Scr of 1.62, now increased to 2.48. At home, she was on bumex 1 mg PO BID, given 40 IV lasix on admission, then bumex 2mg IV BID on 8/12 and in am on 8/13.  Has a lung etiology also contributing to her SOB. She was admitted 8/4-8/7 with SOB/volume overload after a pRBC transfusion and may not have been completely optimized prior to DC on 8/11. Pt currently euvolemic, saturating well on RA. Case discussed with cardiology team. Planned for RHC. Agree to hold diuretic therapy for now. Monitor BMP and I/O. Dose meds per eGFR and avoid nephrotoxic agents. Pt with ISAMAR on CKD likely in setting of HF exacerbation. Per [facility], baseline Scr ~2, admitted with Scr of 1.62, now increased to 2.48. At home, she was on bumex 1 mg PO BID, given 40 IV lasix on admission, then bumex 2mg IV BID on 8/12 and in am on 8/13.  Has a lung etiology also contributing to her SOB. She was admitted 8/4-8/7 with SOB/volume overload after a pRBC transfusion and may not have been completely optimized prior to DC on 8/11. Pt currently euvolemic, saturating well on RA. Case discussed with cardiology team. Planned for RHC.  Monitor BMP and I/O. Dose meds per eGFR and avoid nephrotoxic agents. Pt with ISAMAR on CKD likely in setting of HF exacerbation. Per [facility], baseline Scr ~2, admitted with Scr of 1.62, now increased to 2.48. At home, she was on bumex 1 mg PO BID, given 40 IV lasix on admission, then bumex 2mg IV BID on 8/12 and in am on 8/13.  Has a lung etiology also contributing to her SOB. She was admitted 8/4-8/7 with SOB/volume overload after a pRBC transfusion and may not have been completely optimized prior to DC on 8/11. Pt currently euvolemic, saturating well on RA. Case discussed with cardiology team. Planned for RHC. Continue with Sodium Bicarbonate. Monitor BMP and I/O. Dose meds per eGFR and avoid nephrotoxic agents.

## 2023-08-16 NOTE — CONSULT NOTE ADULT - NS ATTEND AMEND GEN_ALL_CORE FT
59 y/o F with HFpEF and pulmonary HTN, admitted with ADHF. Diuresed with improvement in symptoms. HF consulted for consideration of CardioMEMs. Discussed with patient and she is hesitant to proceed at this time, but will think about it. Can be done inpatient or outpatient so no urgency.   Patient appears mildly volume overloaded with bibasilar crackles. Recommend resuming low dose PO diuretics, Bumex 1mg PO daily for now.

## 2023-08-16 NOTE — CONSULT NOTE ADULT - SUBJECTIVE AND OBJECTIVE BOX
Patient is a 58y old  Female who presents with a chief complaint of CHF Exacerbation (16 Aug 2023 13:23)      HPI:    PAST MEDICAL & SURGICAL HISTORY:  HTN (hypertension)      HLD (hyperlipidemia)      DM (diabetes mellitus)      Hypothyroid      H/O pulmonary hypertension      CKD (chronic kidney disease)      S/P cataract surgery          FAMILY HISTORY:  FH: stroke (Father)        Home Medications:  amLODIPine 5 mg oral tablet: 1 orally (05 Aug 2023 10:50)      Medications:  amLODIPine   Tablet 5 milliGRAM(s) Oral daily  atorvastatin 40 milliGRAM(s) Oral at bedtime  darbepoetin Injectable ViaL 60 MICROGram(s) SubCutaneous once  dextrose 5%. 1000 milliLiter(s) IV Continuous <Continuous>  dextrose 5%. 1000 milliLiter(s) IV Continuous <Continuous>  dextrose 50% Injectable 25 Gram(s) IV Push once  dextrose 50% Injectable 12.5 Gram(s) IV Push once  dextrose 50% Injectable 25 Gram(s) IV Push once  dextrose Oral Gel 15 Gram(s) Oral once PRN  glucagon  Injectable 1 milliGRAM(s) IntraMuscular once  guaiFENesin Oral Liquid (Sugar-Free) 200 milliGRAM(s) Oral every 6 hours PRN  heparin   Injectable 5000 Unit(s) SubCutaneous every 12 hours  hydrALAZINE 100 milliGRAM(s) Oral every 8 hours  insulin lispro (ADMELOG) corrective regimen sliding scale   SubCutaneous three times a day before meals  insulin lispro (ADMELOG) corrective regimen sliding scale   SubCutaneous at bedtime  levothyroxine 125 MICROGram(s) Oral daily  pantoprazole    Tablet 40 milliGRAM(s) Oral before breakfast  sodium bicarbonate 1300 milliGRAM(s) Oral three times a day      Review of systems:  10 point review of systems completed and are negative unless noted in HPI    Vitals:  T(C): 36.3 (23 @ 14:26), Max: 36.4 (23 @ 05:35)  HR: 67 (23 @ 14:26) (65 - 70)  BP: 151/72 (23 @ 14:26) (140/62 - 151/72)  BP(mean): --  RR: 17 (23 @ 14:26) (17 - 18)  SpO2: 96% (23 @ 14:26) (96% - 100%)    Daily     Daily Weight in k (16 Aug 2023 05:35)        I&O's Summary    15 Aug 2023 07:01  -  16 Aug 2023 07:00  --------------------------------------------------------  IN: 236 mL / OUT: 0 mL / NET: 236 mL    16 Aug 2023 07:01  -  16 Aug 2023 15:03  --------------------------------------------------------  IN: 472 mL / OUT: 0 mL / NET: 472 mL        Physical Exam:  Appearance: No Acute Distress  Neck: JVP~12  Cardiovascular: Normal S1 S2  Respiratory: Clear to auscultation bilaterally  Gastrointestinal: Soft, Non-tender	  Skin: No cyanosis	  Neurologic: Non-focal  Extremities: Trace BLE edema  Psychiatry: A & O x 3, Mood & affect appropriate    Labs:                        7.2    6.25  )-----------( 141      ( 16 Aug 2023 11:18 )             22.5     08-    132<L>  |  98  |  66<H>  ----------------------------<  142<H>  4.9   |  22  |  2.38<H>    Ca    9.0      16 Aug 2023 05:55  Phos  4.7       Mg     2.00         Xray Chest 1 View- PORTABLE-Urgent (Xray Chest 1 View- PORTABLE-Urgent .) (23 @ 15:50)     FINDINGS:  The is midline  The heart is normal in size.  No focal consolidations.  There is no pneumothorax. There are small, bilateral pleural effusions   bilaterally.  No acute bony abnormality.    IMPRESSION:  Mild bilateral pleural effusions.        12 Lead ECG (23 @ 17:30)     Ventricular Rate 75 BPM    Atrial Rate 75 BPM    P-R Interval 178 ms    QRS Duration 78 ms    Q-T Interval 386 ms    QTC Calculation(Bazett) 431 ms    P Axis 50 degrees    R Axis -9 degrees    T Axis 63 degrees    Diagnosis Line Normal sinus rhythm  Possible Left atrial enlargement  Borderline ECG      TELEMETRY: Sinus Rhythm     Echocardiogram:  Transthoracic Echocardiogram (23 @ 12:30)   DIMENSIONS:  Dimensions:     Normal Values:  LA:     3.6 cm    2.0 - 4.0 cm  Ao:     2.2 cm    2.0 - 3.8 cm  SEPTUM: 0.7 cm    0.6 - 1.2 cm  PWT:    0.9 cm    0.6 - 1.1 cm  LVIDd:  4.6 cm    3.0 - 5.6 cm  LVIDs:  2.6 cm    1.8 - 4.0 cm  Derived Variables:  LVMI: 72 g/m2  RWT: 0.39  Fractional short: 43 %  Ejection Fraction (Modified Macedo Rule): 69 %  ------------------------------------------------------------------------  OBSERVATIONS:  Mitral Valve: Normal mitral valve. Minimal mitral  regurgitation.  Aortic Root: Normal aortic root.  Aortic Valve: Normal trileaflet aortic valve.  Left Atrium: Moderately dilated left atrium.  LA volume  index = 45 cc/m2.  Left Ventricle: Normal left ventricular systolic function.  No segmental wall motion abnormalities. Normal left  ventricular internal dimensions and wall thicknesses.  (DT:152 ms).  Right Heart: Normal right atrium. Right ventricular  enlargement with normal right ventricular systolic  function. Normal tricuspid valve.  Moderate-severe  tricuspid regurgitation. Normal pulmonic valve. Moderate  pulmonic regurgitation.  Pericardium/PleuraNormal pericardium with trace pericardial  effusion. Right pleural effusion.  Hemodynamic: Estimated right ventricular systolic pressure  equals 65 mm Hg, assuming right atrial pressure equals 10  mm Hg, consistent with severe pulmonary hypertension.  ------------------------------------------------------------------------  CONCLUSIONS:  1. Normal left ventricular systolic function. No segmental  wall motion abnormalities.  2. Right ventricular enlargement with normal right  ventricular systolic function.  3. Normal tricuspid valve.Moderate-severe tricuspid  regurgitation.  4. Estimated pulmonary artery systolic pressure equals 65  mm Hg, assuming right atrial pressure equals 10  mm Hg,  consistent with severe pulmonary hypertension.  *** Compared with echocardiogram of 2023, no  significant changes noted.         Patient is a 58y old  Female who presents with a chief complaint of CHF Exacerbation (16 Aug 2023 13:23)      HPI:    58 year old Female with PMH of  HTN, HLD, DM2, asthma, CKD (baseline SCr ~2.0), hypothyroidism c/b myxedema coma in past), anemia requiring recent transfusions (followed by GI with plan for scope), and HFpEF with severe pulmonary hypertension (EF 64%;LVIDd 4.6 RVE with normal RV function, mod-severe TR and severe PH RVSP~65). Patient presented with c/o progressive worsening SOB/MARKS and 3 pillow orthopnea X 5days; treated in the ED with IV Lasix 40mg followed by IV Bumex 2mg daily on  and .        HF Consult request for diuretic management and CardioMEMS implant (discussed/reviewed with patient and booklet provided, she will consider as OP).      PAST MEDICAL & SURGICAL HISTORY:  HTN (hypertension)      HLD (hyperlipidemia)      DM (diabetes mellitus)      Hypothyroid      H/O pulmonary hypertension      CKD (chronic kidney disease)      S/P cataract surgery          FAMILY HISTORY:  FH: stroke (Father)        Home Medications:  amLODIPine 5 mg oral tablet: 1 orally (05 Aug 2023 10:50)      Medications:  amLODIPine   Tablet 5 milliGRAM(s) Oral daily  atorvastatin 40 milliGRAM(s) Oral at bedtime  darbepoetin Injectable ViaL 60 MICROGram(s) SubCutaneous once  dextrose 5%. 1000 milliLiter(s) IV Continuous <Continuous>  dextrose 5%. 1000 milliLiter(s) IV Continuous <Continuous>  dextrose 50% Injectable 25 Gram(s) IV Push once  dextrose 50% Injectable 12.5 Gram(s) IV Push once  dextrose 50% Injectable 25 Gram(s) IV Push once  dextrose Oral Gel 15 Gram(s) Oral once PRN  glucagon  Injectable 1 milliGRAM(s) IntraMuscular once  guaiFENesin Oral Liquid (Sugar-Free) 200 milliGRAM(s) Oral every 6 hours PRN  heparin   Injectable 5000 Unit(s) SubCutaneous every 12 hours  hydrALAZINE 100 milliGRAM(s) Oral every 8 hours  insulin lispro (ADMELOG) corrective regimen sliding scale   SubCutaneous three times a day before meals  insulin lispro (ADMELOG) corrective regimen sliding scale   SubCutaneous at bedtime  levothyroxine 125 MICROGram(s) Oral daily  pantoprazole    Tablet 40 milliGRAM(s) Oral before breakfast  sodium bicarbonate 1300 milliGRAM(s) Oral three times a day      Review of systems:  10 point review of systems completed and are negative unless noted in HPI    Vitals:  T(C): 36.3 (23 @ 14:26), Max: 36.4 (23 @ 05:35)  HR: 67 (23 @ 14:26) (65 - 70)  BP: 151/72 (23 @ 14:26) (140/62 - 151/72)  BP(mean): --  RR: 17 (23 @ 14:26) (17 - 18)  SpO2: 96% (23 @ 14:26) (96% - 100%)    Daily     Daily Weight in k (16 Aug 2023 05:35)        I&O's Summary    15 Aug 2023 07:  -  16 Aug 2023 07:00  --------------------------------------------------------  IN: 236 mL / OUT: 0 mL / NET: 236 mL    16 Aug 2023 07:01  -  16 Aug 2023 15:03  --------------------------------------------------------  IN: 472 mL / OUT: 0 mL / NET: 472 mL        Physical Exam:  Appearance: No Acute Distress  Neck: JVP~12  Cardiovascular: Normal S1 S2  Respiratory: Clear to auscultation bilaterally  Gastrointestinal: Soft, Non-tender	  Skin: No cyanosis	  Neurologic: Non-focal  Extremities: Trace BLE edema  Psychiatry: A & O x 3, Mood & affect appropriate    Labs:                        7.2    6.25  )-----------( 141      ( 16 Aug 2023 11:18 )             22.5     08-    132<L>  |  98  |  66<H>  ----------------------------<  142<H>  4.9   |  22  |  2.38<H>    Ca    9.0      16 Aug 2023 05:55  Phos  4.7     08-16  Mg     2.00     08-16    Xray Chest 1 View- PORTABLE-Urgent (Xray Chest 1 View- PORTABLE-Urgent .) (23 @ 15:50)     FINDINGS:  The is midline  The heart is normal in size.  No focal consolidations.  There is no pneumothorax. There are small, bilateral pleural effusions   bilaterally.  No acute bony abnormality.    IMPRESSION:  Mild bilateral pleural effusions.        12 Lead ECG (23 @ 17:30)     Ventricular Rate 75 BPM    Atrial Rate 75 BPM    P-R Interval 178 ms    QRS Duration 78 ms    Q-T Interval 386 ms    QTC Calculation(Bazett) 431 ms    P Axis 50 degrees    R Axis -9 degrees    T Axis 63 degrees    Diagnosis Line Normal sinus rhythm  Possible Left atrial enlargement  Borderline ECG      TELEMETRY: Sinus Rhythm     Echocardiogram:  Transthoracic Echocardiogram (23 @ 12:30)   DIMENSIONS:  Dimensions:     Normal Values:  LA:     3.6 cm    2.0 - 4.0 cm  Ao:     2.2 cm    2.0 - 3.8 cm  SEPTUM: 0.7 cm    0.6 - 1.2 cm  PWT:    0.9 cm    0.6 - 1.1 cm  LVIDd:  4.6 cm    3.0 - 5.6 cm  LVIDs:  2.6 cm    1.8 - 4.0 cm  Derived Variables:  LVMI: 72 g/m2  RWT: 0.39  Fractional short: 43 %  Ejection Fraction (Modified Macedo Rule): 69 %  ------------------------------------------------------------------------  OBSERVATIONS:  Mitral Valve: Normal mitral valve. Minimal mitral  regurgitation.  Aortic Root: Normal aortic root.  Aortic Valve: Normal trileaflet aortic valve.  Left Atrium: Moderately dilated left atrium.  LA volume  index = 45 cc/m2.  Left Ventricle: Normal left ventricular systolic function.  No segmental wall motion abnormalities. Normal left  ventricular internal dimensions and wall thicknesses.  (DT:152 ms).  Right Heart: Normal right atrium. Right ventricular  enlargement with normal right ventricular systolic  function. Normal tricuspid valve.  Moderate-severe  tricuspid regurgitation. Normal pulmonic valve. Moderate  pulmonic regurgitation.  Pericardium/PleuraNormal pericardium with trace pericardial  effusion. Right pleural effusion.  Hemodynamic: Estimated right ventricular systolic pressure  equals 65 mm Hg, assuming right atrial pressure equals 10  mm Hg, consistent with severe pulmonary hypertension.  ------------------------------------------------------------------------  CONCLUSIONS:  1. Normal left ventricular systolic function. No segmental  wall motion abnormalities.  2. Right ventricular enlargement with normal right  ventricular systolic function.  3. Normal tricuspid valve.Moderate-severe tricuspid  regurgitation.  4. Estimated pulmonary artery systolic pressure equals 65  mm Hg, assuming right atrial pressure equals 10  mm Hg,  consistent with severe pulmonary hypertension.  *** Compared with echocardiogram of 2023, no  significant changes noted.         Patient is a 58y old  Female who presents with a chief complaint of CHF Exacerbation (16 Aug 2023 13:23)      HPI:    58 year old Female with PMH of  HTN, HLD, DM2, asthma, CKD (baseline SCr ~2.0), hypothyroidism c/b myxedema coma in past), anemia requiring recent transfusions (followed by GI with plan for scope), and HFpEF with severe pulmonary hypertension (EF 64%;LVIDd 4.6 RVE with normal RV function, mod-severe TR and severe PH RVSP~65). Patient presented with c/o progressive worsening SOB/MARKS and 3 pillow orthopnea X 5days; treated in the ED with IV Lasix 40mg followed by IV Bumex 2mg daily on  and .        HF Consult request for diuretic management and CardioMEMS implant (discussed/reviewed with patient and booklet provided, she will consider as OP).      PAST MEDICAL & SURGICAL HISTORY:  HTN (hypertension)      HLD (hyperlipidemia)      DM (diabetes mellitus)      Hypothyroid      H/O pulmonary hypertension      CKD (chronic kidney disease)      S/P cataract surgery          FAMILY HISTORY:  FH: stroke (Father)        Home Medications:  amLODIPine 5 mg oral tablet: 1 orally (05 Aug 2023 10:50)      Medications:  amLODIPine   Tablet 5 milliGRAM(s) Oral daily  atorvastatin 40 milliGRAM(s) Oral at bedtime  darbepoetin Injectable ViaL 60 MICROGram(s) SubCutaneous once  dextrose 5%. 1000 milliLiter(s) IV Continuous <Continuous>  dextrose 5%. 1000 milliLiter(s) IV Continuous <Continuous>  dextrose 50% Injectable 25 Gram(s) IV Push once  dextrose 50% Injectable 12.5 Gram(s) IV Push once  dextrose 50% Injectable 25 Gram(s) IV Push once  dextrose Oral Gel 15 Gram(s) Oral once PRN  glucagon  Injectable 1 milliGRAM(s) IntraMuscular once  guaiFENesin Oral Liquid (Sugar-Free) 200 milliGRAM(s) Oral every 6 hours PRN  heparin   Injectable 5000 Unit(s) SubCutaneous every 12 hours  hydrALAZINE 100 milliGRAM(s) Oral every 8 hours  insulin lispro (ADMELOG) corrective regimen sliding scale   SubCutaneous three times a day before meals  insulin lispro (ADMELOG) corrective regimen sliding scale   SubCutaneous at bedtime  levothyroxine 125 MICROGram(s) Oral daily  pantoprazole    Tablet 40 milliGRAM(s) Oral before breakfast  sodium bicarbonate 1300 milliGRAM(s) Oral three times a day      Review of systems:  10 point review of systems completed and are negative unless noted in HPI    Vitals:  T(C): 36.3 (23 @ 14:26), Max: 36.4 (23 @ 05:35)  HR: 67 (23 @ 14:26) (65 - 70)  BP: 151/72 (23 @ 14:26) (140/62 - 151/72)  BP(mean): --  RR: 17 (23 @ 14:26) (17 - 18)  SpO2: 96% (23 @ 14:26) (96% - 100%)    Daily     Daily Weight in k (16 Aug 2023 05:35)        I&O's Summary    15 Aug 2023 07:  -  16 Aug 2023 07:00  --------------------------------------------------------  IN: 236 mL / OUT: 0 mL / NET: 236 mL    16 Aug 2023 07:01  -  16 Aug 2023 15:03  --------------------------------------------------------  IN: 472 mL / OUT: 0 mL / NET: 472 mL        Physical Exam:  Appearance: No Acute Distress  Neck: JVP~12  Cardiovascular: Normal S1 S2  Respiratory: Clear to auscultation fine bibasilar  rales   Gastrointestinal: Soft, Non-tender	  Skin: No cyanosis	  Neurologic: Non-focal  Extremities: Trace BLE edema  Psychiatry: A & O x 3, Mood & affect appropriate    Labs:                        7.2    6.25  )-----------( 141      ( 16 Aug 2023 11:18 )             22.5     08-16    132<L>  |  98  |  66<H>  ----------------------------<  142<H>  4.9   |  22  |  2.38<H>    Ca    9.0      16 Aug 2023 05:55  Phos  4.7       Mg     2.00         Xray Chest 1 View- PORTABLE-Urgent (Xray Chest 1 View- PORTABLE-Urgent .) (23 @ 15:50)     FINDINGS:  The is midline  The heart is normal in size.  No focal consolidations.  There is no pneumothorax. There are small, bilateral pleural effusions   bilaterally.  No acute bony abnormality.    IMPRESSION:  Mild bilateral pleural effusions.        12 Lead ECG (23 @ 17:30)     Ventricular Rate 75 BPM    Atrial Rate 75 BPM    P-R Interval 178 ms    QRS Duration 78 ms    Q-T Interval 386 ms    QTC Calculation(Bazett) 431 ms    P Axis 50 degrees    R Axis -9 degrees    T Axis 63 degrees    Diagnosis Line Normal sinus rhythm  Possible Left atrial enlargement  Borderline ECG      TELEMETRY: Sinus Rhythm     Echocardiogram:  Transthoracic Echocardiogram (23 @ 12:30)   DIMENSIONS:  Dimensions:     Normal Values:  LA:     3.6 cm    2.0 - 4.0 cm  Ao:     2.2 cm    2.0 - 3.8 cm  SEPTUM: 0.7 cm    0.6 - 1.2 cm  PWT:    0.9 cm    0.6 - 1.1 cm  LVIDd:  4.6 cm    3.0 - 5.6 cm  LVIDs:  2.6 cm    1.8 - 4.0 cm  Derived Variables:  LVMI: 72 g/m2  RWT: 0.39  Fractional short: 43 %  Ejection Fraction (Modified Macedo Rule): 69 %  ------------------------------------------------------------------------  OBSERVATIONS:  Mitral Valve: Normal mitral valve. Minimal mitral  regurgitation.  Aortic Root: Normal aortic root.  Aortic Valve: Normal trileaflet aortic valve.  Left Atrium: Moderately dilated left atrium.  LA volume  index = 45 cc/m2.  Left Ventricle: Normal left ventricular systolic function.  No segmental wall motion abnormalities. Normal left  ventricular internal dimensions and wall thicknesses.  (DT:152 ms).  Right Heart: Normal right atrium. Right ventricular  enlargement with normal right ventricular systolic  function. Normal tricuspid valve.  Moderate-severe  tricuspid regurgitation. Normal pulmonic valve. Moderate  pulmonic regurgitation.  Pericardium/PleuraNormal pericardium with trace pericardial  effusion. Right pleural effusion.  Hemodynamic: Estimated right ventricular systolic pressure  equals 65 mm Hg, assuming right atrial pressure equals 10  mm Hg, consistent with severe pulmonary hypertension.  ------------------------------------------------------------------------  CONCLUSIONS:  1. Normal left ventricular systolic function. No segmental  wall motion abnormalities.  2. Right ventricular enlargement with normal right  ventricular systolic function.  3. Normal tricuspid valve.Moderate-severe tricuspid  regurgitation.  4. Estimated pulmonary artery systolic pressure equals 65  mm Hg, assuming right atrial pressure equals 10  mm Hg,  consistent with severe pulmonary hypertension.  *** Compared with echocardiogram of 2023, no  significant changes noted.

## 2023-08-16 NOTE — PROGRESS NOTE ADULT - SUBJECTIVE AND OBJECTIVE BOX
[facility] Division of Kidney Diseases & Hypertension  FOLLOW UP NOTE  [phone]--------------------------------------------------------------------------------  Chief Complaint:Heart failure    HPI: 58 y.o F w/ PMhx of HTN, DM, CKD (baseline Cr 2), hypothyroidism, pulmonary HTN here with SOB and found to be in decompensated heart failure exacerbation. Patient was recently discharged on 8/7 after she had an admission for a similar reason and improved with IV diuresis. Nephrology consulted for rising serum creatinine, Cr of 2.47. CXR with diffuse haziness, worse than before and BNP of ~6K vs 5K on 8/5. Initially needed bipap, now off.  Follows with Dr. Coyle outpatient.     24 hour events/subjective: Pt seen and examined at bedside. Reports feeling well, SOB has resolved. No acute complaints. Denies fevers, HA, SOB, CP, n/v, edema.     PAST HISTORY  --------------------------------------------------------------------------------  No significant changes to PMH, PSH, FHx, SHx, unless otherwise noted    ALLERGIES & MEDICATIONS  --------------------------------------------------------------------------------  Allergies  No Known Allergies  Intolerances    Standing Inpatient Medications  amLODIPine   Tablet 5 milliGRAM(s) Oral daily  atorvastatin 40 milliGRAM(s) Oral at bedtime  dextrose 5%. 1000 milliLiter(s) IV Continuous <Continuous>  dextrose 5%. 1000 milliLiter(s) IV Continuous <Continuous>  dextrose 50% Injectable 25 Gram(s) IV Push once  dextrose 50% Injectable 12.5 Gram(s) IV Push once  dextrose 50% Injectable 25 Gram(s) IV Push once  glucagon  Injectable 1 milliGRAM(s) IntraMuscular once  heparin   Injectable 5000 Unit(s) SubCutaneous every 12 hours  hydrALAZINE 100 milliGRAM(s) Oral every 8 hours  insulin lispro (ADMELOG) corrective regimen sliding scale   SubCutaneous three times a day before meals  insulin lispro (ADMELOG) corrective regimen sliding scale   SubCutaneous at bedtime  levothyroxine 125 MICROGram(s) Oral daily  pantoprazole    Tablet 40 milliGRAM(s) Oral before breakfast  sodium bicarbonate 1300 milliGRAM(s) Oral three times a day    PRN Inpatient Medications  dextrose Oral Gel 15 Gram(s) Oral once PRN  guaiFENesin Oral Liquid (Sugar-Free) 200 milliGRAM(s) Oral every 6 hours PRN      REVIEW OF SYSTEMS  --------------------------------------------------------------------------------  as per above    VITALS/PHYSICAL EXAM  --------------------------------------------------------------------------------  T(C): 36.4 (08-16-23 @ 09:30), Max: 36.4 (08-16-23 @ 05:35)  HR: 67 (08-16-23 @ 09:30) (65 - 70)  BP: 141/60 (08-16-23 @ 09:30) (141/60 - 151/64)  RR: 17 (08-16-23 @ 09:30) (17 - 18)  SpO2: 100% (08-16-23 @ 09:30) (96% - 100%)  Wt(kg): --    08-15-23 @ 07:01  -  08-16-23 @ 07:00  --------------------------------------------------------  IN: 236 mL / OUT: 0 mL / NET: 236 mL    08-16-23 @ 07:01  -  08-16-23 @ 13:20  --------------------------------------------------------  IN: 236 mL / OUT: 0 mL / NET: 236 mL      Physical Exam:  Gen: NAD; sitting up and watching TV   HEENT: Anicteric  Pulm: CTAB  CV: S1S2+  Abd: Soft, +BS    Ext: No LE edema B/L  Neuro: Awake  Skin: Warm and dry    LABS/STUDIES  --------------------------------------------------------------------------------              7.2    6.25  >-----------<  141      [08-16-23 @ 11:18]              22.5     132  |  98  |  66  ----------------------------<  142      [08-16-23 @ 05:55]  4.9   |  22  |  2.38        Ca     9.0     [08-16-23 @ 05:55]      Mg     2.00     [08-16-23 @ 05:55]      Phos  4.7     [08-16-23 @ 05:55]  Creatinine Trend:  SCr 2.38 [08-16 @ 05:55]  SCr 2.48 [08-15 @ 05:40]  SCr 2.50 [08-14 @ 20:47]  SCr 2.51 [08-14 @ 05:00]  SCr 2.46 [08-13 @ 18:24]    Urinalysis - [08-16-23 @ 05:55]      Color  / Appearance  / SG  / pH       Gluc 142 / Ketone   / Bili  / Urobili        Blood  / Protein  / Leuk Est  / Nitrite       RBC  / WBC  / Hyaline  / Gran  / Sq Epi  / Non Sq Epi  / Bacteria     Urine Creatinine 46      [08-14-23 @ 00:05]  Urine Protein 183      [08-14-23 @ 00:05]    TSH 4.82      [08-12-23 @ 00:43]

## 2023-08-16 NOTE — PROGRESS NOTE ADULT - PROBLEM SELECTOR PLAN 1
- Home regimen PO bumex 1mg PO BID  - BNP 6228, CXR with persistent small L pleural effusion but appears to be more hazy than CXR 8/4.  - TTE 8/6/23 with LVEF 69%, mod-sev TR, severe pulmonary HTN  - Weaned off BIPAP -> now on O2  - discontinued Bumex 2mg BID IV for now  - Given the discrepancy between LV function and class 2 pulm HTN, consulted cardio - appreciate input  - Strict I/O, daily weight. Pt has had multiple admissions for CHF, please document dry weight upon discharge    Repeat CXR 8/14 shows mild b/l pleural effusions. Mild wheezing on auscultation.  Cardiology and nephrology currently recommending to hold further diuresis. Heart failure team consulted, appreciate input. Cardiology/nephrology thinking about RHC/cardioMEMS

## 2023-08-16 NOTE — PROGRESS NOTE ADULT - SUBJECTIVE AND OBJECTIVE BOX
[facility] of [facility] Medicine  [place] Saeed   Available via MS Teams  In house pager [phone]    SUBJECTIVE / OVERNIGHT EVENTS:  No acute events overnight.  Patient today denies acute complaints.  Became tearful, saying she has been in multiple hospitals for the majority of the past couple of years since her coma.    ADDITIONAL REVIEW OF SYSTEMS:    MEDICATIONS  (STANDING):  amLODIPine   Tablet 5 milliGRAM(s) Oral daily  atorvastatin 40 milliGRAM(s) Oral at bedtime  dextrose 5%. 1000 milliLiter(s) (100 mL/Hr) IV Continuous <Continuous>  dextrose 5%. 1000 milliLiter(s) (50 mL/Hr) IV Continuous <Continuous>  dextrose 50% Injectable 25 Gram(s) IV Push once  dextrose 50% Injectable 12.5 Gram(s) IV Push once  dextrose 50% Injectable 25 Gram(s) IV Push once  glucagon  Injectable 1 milliGRAM(s) IntraMuscular once  heparin   Injectable 5000 Unit(s) SubCutaneous every 12 hours  hydrALAZINE 100 milliGRAM(s) Oral every 8 hours  insulin lispro (ADMELOG) corrective regimen sliding scale   SubCutaneous three times a day before meals  insulin lispro (ADMELOG) corrective regimen sliding scale   SubCutaneous at bedtime  levothyroxine 125 MICROGram(s) Oral daily  pantoprazole    Tablet 40 milliGRAM(s) Oral before breakfast  sodium bicarbonate 1300 milliGRAM(s) Oral three times a day    MEDICATIONS  (PRN):  dextrose Oral Gel 15 Gram(s) Oral once PRN Blood Glucose LESS THAN 70 milliGRAM(s)/deciliter  guaiFENesin Oral Liquid (Sugar-Free) 200 milliGRAM(s) Oral every 6 hours PRN Cough      I&O's Summary    15 Aug 2023 07:01  -  16 Aug 2023 07:00  --------------------------------------------------------  IN: 236 mL / OUT: 0 mL / NET: 236 mL    16 Aug 2023 07:01  -  16 Aug 2023 13:23  --------------------------------------------------------  IN: 236 mL / OUT: 0 mL / NET: 236 mL        PHYSICAL EXAM:  Vital Signs Last 24 Hrs  T(C): 36.4 (16 Aug 2023 09:30), Max: 36.4 (16 Aug 2023 05:35)  T(F): 97.6 (16 Aug 2023 09:30), Max: 97.6 (16 Aug 2023 05:35)  HR: 67 (16 Aug 2023 09:30) (65 - 70)  BP: 141/60 (16 Aug 2023 09:30) (141/60 - 151/64)  BP(mean): --  RR: 17 (16 Aug 2023 09:30) (17 - 18)  SpO2: 100% (16 Aug 2023 09:30) (96% - 100%)    Parameters below as of 16 Aug 2023 09:30  Patient On (Oxygen Delivery Method): room air      CONSTITUTIONAL: NAD, well-developed, well-groomed  EYES: PERRLA; conjunctiva and sclera clear  ENMT: Moist oral mucosa, no pharyngeal injection or exudates; normal dentition  NECK: Supple, no palpable masses; no thyromegaly  RESPIRATORY: Normal respiratory effort; lungs are clear to auscultation bilaterally  CARDIOVASCULAR: Regular rate and rhythm, normal S1 and S2, no murmur/rub/gallop; No lower extremity edema; Peripheral pulses are 2+ bilaterally  ABDOMEN: Nontender to palpation, normoactive bowel sounds, no rebound/guarding; No hepatosplenomegaly  MUSCULOSKELETAL:  Normal gait; no clubbing or cyanosis of digits; no joint swelling or tenderness to palpation  PSYCH: A+O to person, place, and time; affect tearful  NEUROLOGY: CN 2-12 are intact and symmetric; no gross sensory deficits   SKIN: No rashes; no palpable lesions    LABS:                        7.2    6.25  )-----------( 141      ( 16 Aug 2023 11:18 )             22.5     08-16    132<L>  |  98  |  66<H>  ----------------------------<  142<H>  4.9   |  22  |  2.38<H>    Ca    9.0      16 Aug 2023 05:55  Phos  4.7     08-16  Mg     2.00     08-16            Urinalysis Basic - ( 16 Aug 2023 05:55 )    Color: x / Appearance: x / SG: x / pH: x  Gluc: 142 mg/dL / Ketone: x  / Bili: x / Urobili: x   Blood: x / Protein: x / Nitrite: x   Leuk Esterase: x / RBC: x / WBC x   Sq Epi: x / Non Sq Epi: x / Bacteria: x        SARS-CoV-2: NotDetec (11 Aug 2023 21:46)  SARS-CoV-2: NotDetec (22 Jun 2023 05:10)

## 2023-08-16 NOTE — PROGRESS NOTE ADULT - PROBLEM SELECTOR PLAN 2
Patient with elevated potassium on admission. Now normalized. Continue with home sodium bicarbonate, as they are needed for long term kidney protection in patients with CKD, target serum bicarb>22. Sodium bicarb tabs can also help with potassium control. Monitor serum potassium. Ensure low potassium/renal diet.

## 2023-08-16 NOTE — CONSULT NOTE ADULT - ASSESSMENT
58 year old female with PMH of  HTN, HLD, DM2, asthma, CKD (baseline SCr ~2.0), hypothyroidism c/b myxedema coma in past), anemia requiring recent transfusions (followed by GI with plan for scope), and HFpEF with severe pulmonary hypertension (EF 64%;LVIDd 4.6 RVE with normal RV function, mod-severe TR and severe PH RVSP~65). Patient presented with c/o progressive worsening SOB/MARKS and 3 pillow orthopnea X 5days; treated in the ED with IV Lasix 40mg followed by IV Bumex 2mg daily on 8/12 and 8/13.        Pertinent Labs: BNP  6, 450     Lactate 1.0    Troponin levels    51/48    BUN/Cr  66/2.3    TSH: 4.8  T3/T4  56/1.9            K/L  (1/2023)  8.9/3.75 ratio 2.35    CXR: Mild bilateral pleural effusions    EKG:  Normal sinus rhythm, possible Left atrial enlargement    TTE: Normal LV systolic function, Right ventricular enlargement with normal RV systolic function, Moderate-severe TR and PASP equals 65 consistent with PH.    RHC (1/2023):  RA 17 PA 58/27/41  PCWP  22 CO/CI 8.8/5.3  and PVR 2.16 POWERS         Home Meds:    58 year old Female with PMH of  HTN, HLD, DM2, asthma, CKD (baseline SCr ~2.0), hypothyroidism c/b myxedema coma in past), anemia requiring recent transfusions (followed by GI with plan for scope), and HFpEF with severe pulmonary hypertension (EF 64%;LVIDd 4.6 RVE with normal RV function, mod-severe TR and severe PH RVSP~65). Patient presented with c/o progressive worsening SOB/MARKS and 3 pillow orthopnea X 5days; treated in the ED with IV Lasix 40mg followed by IV Bumex 2mg daily on 8/12 and 8/13.        HF Consult request for diuretic management and CardioMEMS implant (discussed/reviewed with patient and booklet provided, she will consider as OP)         Pertinent Labs: BNP  6, 450     Lactate 1.0    Troponin levels    51/48    BUN/Cr  66/2.3    TSH: 4.8  T3/T4  56/1.9            K/L  (1/2023)  8.9/3.75 ratio 2.35    CXR: Mild bilateral pleural effusions    EKG:  Normal sinus rhythm, possible Left atrial enlargement    TTE: Normal LV systolic function, Right ventricular enlargement with normal RV systolic function, Moderate-severe TR and PASP equals 65 consistent with PH.    RHC (1/2023):  RA 17 PA 58/27/41  PCWP  22 CO/CI 8.8/5.3  and PVR 2.16 POWERS       Home Meds:   Bumex 1mg BID (currently on hold per Nephrology)   Hydralazine 100mg Q8H   Amlodipine 5mg daily

## 2023-08-16 NOTE — PROGRESS NOTE ADULT - SUBJECTIVE AND OBJECTIVE BOX
Cardiology Progress Note  ------------------------------------------------------------------------------------------  SUBJECTIVE:   No events overnight. Denies CP, SOB or Palpitations.   Tele: NSR  -------------------------------------------------------------------------------------------  ROS:  CV: chest pain (-), palpitation (-), orthopnea (-), PND (-), edema (-)  PULM: SOB (-), cough (-), wheezing (-), hemoptysis (-).   CONST: fever (-), chills (-) or fatigue (-)  GI: abdominal distension (-), abdominal pain (-) , nausea/vomiting (-), hematemesis, (-), melena (-), hematochezia (-)  : dysuria (-), frequency (-), hematuria (-).   NEURO: numbness (-), weakness (-), dizziness (-)  MSK: myalgia (-), joint pain (-)   SKIN: itching (-), rash (-)  HEENT:  visual changes (-); vertigo or throat pain (-);  neck stiffness (-)   Psych: change in mood (-), anxiety (-), depression (-)     All other review of systems is negative unless indicated above.   -------------------------------------------------------------------------------------------  VS:  T(F): 97.6 (08-16), Max: 97.6 (08-16)  HR: 70 (08-16) (62 - 70)  BP: 143/65 (08-16) (137/62 - 151/64)  RR: 18 (08-16)  SpO2: 97% (08-16)  I&O's Summary    15 Aug 2023 07:01  -  16 Aug 2023 07:00  --------------------------------------------------------  IN: 236 mL / OUT: 0 mL / NET: 236 mL      ------------------------------------------------------------------------------------------  PHYSICAL EXAM:  General: No acute distress. Awake and conversant.   Eyes: Normal conjunctiva, anicteric. Round symmetric pupils.   ENT: Hearing grossly intact. No nasal discharge.   Neck: Neck is supple. No masses or thyromegaly.   Respiratory: Respirations are non-labored. No wheezing.   Skin: Warm. No rashes or ulcers.   Psych: Alert and oriented. Cooperative, Appropriate mood and affect, Normal judgment.   CV: RRR, no MRG, no JVD. No lower extremity edema.   MSK: Normal ambulation. No clubbing or cyanosis.   Neuro: Sensation and CN II-XII grossly normal.  -------------------------------------------------------------------------------------------  LABS:                          7.0    6.55  )-----------( 140      ( 16 Aug 2023 05:55 )             20.4     08-16    132<L>  |  98  |  66<H>  ----------------------------<  142<H>  4.9   |  22  |  2.38<H>    Ca    9.0      16 Aug 2023 05:55  Phos  4.7     08-16  Mg     2.00     08-16        CARDIAC MARKERS ( 12 Aug 2023 06:49 )  51 ng/L / x     / x     / x     / x     / x      CARDIAC MARKERS ( 11 Aug 2023 19:40 )  48 ng/L / x     / x     / x     / x     / x                -------------------------------------------------------------------------------------------  Meds:  amLODIPine   Tablet 5 milliGRAM(s) Oral daily  atorvastatin 40 milliGRAM(s) Oral at bedtime  dextrose 5%. 1000 milliLiter(s) IV Continuous <Continuous>  dextrose 5%. 1000 milliLiter(s) IV Continuous <Continuous>  dextrose 50% Injectable 25 Gram(s) IV Push once  dextrose 50% Injectable 12.5 Gram(s) IV Push once  dextrose 50% Injectable 25 Gram(s) IV Push once  dextrose Oral Gel 15 Gram(s) Oral once PRN  glucagon  Injectable 1 milliGRAM(s) IntraMuscular once  guaiFENesin Oral Liquid (Sugar-Free) 200 milliGRAM(s) Oral every 6 hours PRN  heparin   Injectable 5000 Unit(s) SubCutaneous every 12 hours  hydrALAZINE 100 milliGRAM(s) Oral every 8 hours  insulin lispro (ADMELOG) corrective regimen sliding scale   SubCutaneous three times a day before meals  insulin lispro (ADMELOG) corrective regimen sliding scale   SubCutaneous at bedtime  levothyroxine 125 MICROGram(s) Oral daily  pantoprazole    Tablet 40 milliGRAM(s) Oral before breakfast  sodium bicarbonate 1300 milliGRAM(s) Oral three times a day    -------------------------------------------------------------------------------------------  Cardiovascular Diagnostic Testing:    ECG: sinus rhythm, normal axis, near low voltage limb leads, no significant ST deviations     Echo:   TTE  DIMENSIONS:  Dimensions:     Normal Values:  LA:     3.6 cm    2.0 - 4.0 cm  Ao:     2.2 cm    2.0 - 3.8 cm  SEPTUM: 0.7 cm    0.6 - 1.2 cm  PWT:    0.9 cm    0.6 - 1.1 cm  LVIDd:  4.6 cm    3.0 - 5.6 cm  LVIDs:  2.6 cm    1.8 - 4.0 cm  Derived Variables:  LVMI: 72 g/m2  RWT: 0.39  Fractional short: 43 %  Ejection Fraction (Modified Macedo Rule): 69 %  ------------------------------------------------------------------------  OBSERVATIONS:  Mitral Valve: Normal mitral valve. Minimal mitral  regurgitation.  Aortic Root: Normal aortic root.  Aortic Valve: Normal trileaflet aortic valve.  Left Atrium: Moderately dilated left atrium.  LA volume  index = 45 cc/m2.  Left Ventricle: Normal left ventricular systolic function.  No segmental wall motion abnormalities. Normal left  ventricular internal dimensions and wall thicknesses.  (DT:152 ms).  Right Heart: Normal right atrium. Right ventricular  enlargement with normal right ventricular systolic  function. Normal tricuspid valve.  Moderate-severe  tricuspid regurgitation. Normal pulmonic valve. Moderate  pulmonic regurgitation.  Pericardium/PleuraNormal pericardium with trace pericardial  effusion. Right pleural effusion.  Hemodynamic: Estimated right ventricular systolic pressure  equals 65 mm Hg, assuming right atrial pressure equals 10  mm Hg, consistent with severe pulmonary hypertension.  ------------------------------------------------------------------------  CONCLUSIONS:  1. Normal left ventricular systolic function. No segmental  wall motion abnormalities.  2. Right ventricular enlargement with normal right  ventricular systolic function.  3. Normal tricuspid valve. Moderate-severe tricuspid  regurgitation.  4. Estimated pulmonary artery systolic pressure equals 65  mm Hg, assuming right atrial pressure equals 10  mm Hg,  consistent with severe pulmonary hypertension.  *** Compared with echocardiogram of 2/27/2023, no  significant changes noted.    Stress Testing:    Cath:  RHC  Diagnostic Conclusions:   RA 17 mmHg. PA 58/27/41 mmHg. PCW 22 mmHg.   CO 8.79 L/min (Hgb 7.7). CI 5.33 L/min/m2.  dsc. PVR 2.16 Kaur.       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   Volume overload improved on diuretics, but now with resultant ISAMAR   1. Severe pHTN  - Cont to hold diuretics, Cr improved today. Case discussed with Nephrology  - Patient euvolemic on exam  2. HTN  - Cont Hydral 100mg q8, Amlodipine 5mg  3. ISAMAR  - Appreciate renal recs, will hold diuresis today  as above      Chavo Zamora MD  Fellow Physician   Cardiology Inpatient Consult Service     Please check [email or web address]: [email or web address] for cardiology service schedule and contact information.  Cardiology Progress Note  ------------------------------------------------------------------------------------------  SUBJECTIVE:   No events overnight. Denies CP, SOB or Palpitations.   Tele: NSR  -------------------------------------------------------------------------------------------  ROS:  CV: chest pain (-), palpitation (-), orthopnea (-), PND (-), edema (-)  PULM: SOB (-), cough (-), wheezing (-), hemoptysis (-).   CONST: fever (-), chills (-) or fatigue (-)  GI: abdominal distension (-), abdominal pain (-) , nausea/vomiting (-), hematemesis, (-), melena (-), hematochezia (-)  : dysuria (-), frequency (-), hematuria (-).   NEURO: numbness (-), weakness (-), dizziness (-)  MSK: myalgia (-), joint pain (-)   SKIN: itching (-), rash (-)  HEENT:  visual changes (-); vertigo or throat pain (-);  neck stiffness (-)   Psych: change in mood (-), anxiety (-), depression (-)     All other review of systems is negative unless indicated above.   -------------------------------------------------------------------------------------------  VS:  T(F): 97.6 (08-16), Max: 97.6 (08-16)  HR: 70 (08-16) (62 - 70)  BP: 143/65 (08-16) (137/62 - 151/64)  RR: 18 (08-16)  SpO2: 97% (08-16)  I&O's Summary    15 Aug 2023 07:01  -  16 Aug 2023 07:00  --------------------------------------------------------  IN: 236 mL / OUT: 0 mL / NET: 236 mL      ------------------------------------------------------------------------------------------  PHYSICAL EXAM:  General: No acute distress. Awake and conversant.   Eyes: Normal conjunctiva, anicteric. Round symmetric pupils.   ENT: Hearing grossly intact. No nasal discharge.   Neck: Neck is supple. No masses or thyromegaly.   Respiratory: Respirations are non-labored. No wheezing.   Skin: Warm. No rashes or ulcers.   Psych: Alert and oriented. Cooperative, Appropriate mood and affect, Normal judgment.   CV: RRR, no MRG, no JVD. No lower extremity edema.   MSK: Normal ambulation. No clubbing or cyanosis.   Neuro: Sensation and CN II-XII grossly normal.  -------------------------------------------------------------------------------------------  LABS:                          7.0    6.55  )-----------( 140      ( 16 Aug 2023 05:55 )             20.4     08-16    132<L>  |  98  |  66<H>  ----------------------------<  142<H>  4.9   |  22  |  2.38<H>    Ca    9.0      16 Aug 2023 05:55  Phos  4.7     08-16  Mg     2.00     08-16        CARDIAC MARKERS ( 12 Aug 2023 06:49 )  51 ng/L / x     / x     / x     / x     / x      CARDIAC MARKERS ( 11 Aug 2023 19:40 )  48 ng/L / x     / x     / x     / x     / x                -------------------------------------------------------------------------------------------  Meds:  amLODIPine   Tablet 5 milliGRAM(s) Oral daily  atorvastatin 40 milliGRAM(s) Oral at bedtime  dextrose 5%. 1000 milliLiter(s) IV Continuous <Continuous>  dextrose 5%. 1000 milliLiter(s) IV Continuous <Continuous>  dextrose 50% Injectable 25 Gram(s) IV Push once  dextrose 50% Injectable 12.5 Gram(s) IV Push once  dextrose 50% Injectable 25 Gram(s) IV Push once  dextrose Oral Gel 15 Gram(s) Oral once PRN  glucagon  Injectable 1 milliGRAM(s) IntraMuscular once  guaiFENesin Oral Liquid (Sugar-Free) 200 milliGRAM(s) Oral every 6 hours PRN  heparin   Injectable 5000 Unit(s) SubCutaneous every 12 hours  hydrALAZINE 100 milliGRAM(s) Oral every 8 hours  insulin lispro (ADMELOG) corrective regimen sliding scale   SubCutaneous three times a day before meals  insulin lispro (ADMELOG) corrective regimen sliding scale   SubCutaneous at bedtime  levothyroxine 125 MICROGram(s) Oral daily  pantoprazole    Tablet 40 milliGRAM(s) Oral before breakfast  sodium bicarbonate 1300 milliGRAM(s) Oral three times a day    -------------------------------------------------------------------------------------------  Cardiovascular Diagnostic Testing:    ECG: sinus rhythm, normal axis, near low voltage limb leads, no significant ST deviations     Echo:   TTE  DIMENSIONS:  Dimensions:     Normal Values:  LA:     3.6 cm    2.0 - 4.0 cm  Ao:     2.2 cm    2.0 - 3.8 cm  SEPTUM: 0.7 cm    0.6 - 1.2 cm  PWT:    0.9 cm    0.6 - 1.1 cm  LVIDd:  4.6 cm    3.0 - 5.6 cm  LVIDs:  2.6 cm    1.8 - 4.0 cm  Derived Variables:  LVMI: 72 g/m2  RWT: 0.39  Fractional short: 43 %  Ejection Fraction (Modified Macedo Rule): 69 %  ------------------------------------------------------------------------  OBSERVATIONS:  Mitral Valve: Normal mitral valve. Minimal mitral  regurgitation.  Aortic Root: Normal aortic root.  Aortic Valve: Normal trileaflet aortic valve.  Left Atrium: Moderately dilated left atrium.  LA volume  index = 45 cc/m2.  Left Ventricle: Normal left ventricular systolic function.  No segmental wall motion abnormalities. Normal left  ventricular internal dimensions and wall thicknesses.  (DT:152 ms).  Right Heart: Normal right atrium. Right ventricular  enlargement with normal right ventricular systolic  function. Normal tricuspid valve.  Moderate-severe  tricuspid regurgitation. Normal pulmonic valve. Moderate  pulmonic regurgitation.  Pericardium/PleuraNormal pericardium with trace pericardial  effusion. Right pleural effusion.  Hemodynamic: Estimated right ventricular systolic pressure  equals 65 mm Hg, assuming right atrial pressure equals 10  mm Hg, consistent with severe pulmonary hypertension.  ------------------------------------------------------------------------  CONCLUSIONS:  1. Normal left ventricular systolic function. No segmental  wall motion abnormalities.  2. Right ventricular enlargement with normal right  ventricular systolic function.  3. Normal tricuspid valve. Moderate-severe tricuspid  regurgitation.  4. Estimated pulmonary artery systolic pressure equals 65  mm Hg, assuming right atrial pressure equals 10  mm Hg,  consistent with severe pulmonary hypertension.  *** Compared with echocardiogram of 2/27/2023, no  significant changes noted.    Stress Testing:    Cath:  RHC  Diagnostic Conclusions:   RA 17 mmHg. PA 58/27/41 mmHg. PCW 22 mmHg.   CO 8.79 L/min (Hgb 7.7). CI 5.33 L/min/m2.  dsc. PVR 2.16 Kaur.       Imaging:    CXR:  reviewed  -------------------------------------------------------------------------------------------  Assessment and Plan: 57 yo F HFpEF, HTN, HLD, DM2, CKD (stage 3-4), hypothyroidism (c/b myxedema coma in past), severe pulmonary hypertension, mod-severe TR, asthma, presents emergency department for 5 days of worsening dyspnea on exertion after recent admission for pRBC transfusion.   Volume overload improved on diuretics, but now with resultant ISAMAR   1. Severe pHTN  - Cont to hold diuretics, Cr improved today. Case discussed with Nephrology  - Patient euvolemic on exam  2. HTN  - Cont Hydral 100mg q8, Amlodipine 5mg  3. ISAMAR  - Appreciate renal recs, will hold diuresis today  as above  4. Acute on chronic HFpEF  - creatinine stable  - RHC with potential Cardiomems given multiple hospitalizations    Chavo Zamora MD  Fellow Physician   Cardiology Inpatient Consult Service     Please check [email or web address]: [email or web address] for cardiology service schedule and contact information.

## 2023-08-17 ENCOUNTER — TRANSCRIPTION ENCOUNTER (OUTPATIENT)
Age: 58
End: 2023-08-17

## 2023-08-17 LAB
ANION GAP SERPL CALC-SCNC: 17 MMOL/L — HIGH (ref 7–14)
BUN SERPL-MCNC: 68 MG/DL — HIGH (ref 7–23)
CALCIUM SERPL-MCNC: 9.2 MG/DL — SIGNIFICANT CHANGE UP (ref 8.4–10.5)
CHLORIDE SERPL-SCNC: 97 MMOL/L — LOW (ref 98–107)
CO2 SERPL-SCNC: 19 MMOL/L — LOW (ref 22–31)
CREAT SERPL-MCNC: 2.38 MG/DL — HIGH (ref 0.5–1.3)
EGFR: 23 ML/MIN/1.73M2 — LOW
FERRITIN SERPL-MCNC: 984 NG/ML — HIGH (ref 13–330)
GLUCOSE BLDC GLUCOMTR-MCNC: 192 MG/DL — HIGH (ref 70–99)
GLUCOSE BLDC GLUCOMTR-MCNC: 200 MG/DL — HIGH (ref 70–99)
GLUCOSE BLDC GLUCOMTR-MCNC: 213 MG/DL — HIGH (ref 70–99)
GLUCOSE BLDC GLUCOMTR-MCNC: 228 MG/DL — HIGH (ref 70–99)
GLUCOSE BLDC GLUCOMTR-MCNC: 276 MG/DL — HIGH (ref 70–99)
GLUCOSE SERPL-MCNC: 176 MG/DL — HIGH (ref 70–99)
HCT VFR BLD CALC: 22 % — LOW (ref 34.5–45)
HGB BLD-MCNC: 7.2 G/DL — LOW (ref 11.5–15.5)
IRON SATN MFR SERPL: 32 % — SIGNIFICANT CHANGE UP (ref 14–50)
IRON SATN MFR SERPL: 86 UG/DL — SIGNIFICANT CHANGE UP (ref 30–160)
MAGNESIUM SERPL-MCNC: 1.9 MG/DL — SIGNIFICANT CHANGE UP (ref 1.6–2.6)
MCHC RBC-ENTMCNC: 26.1 PG — LOW (ref 27–34)
MCHC RBC-ENTMCNC: 32.7 GM/DL — SIGNIFICANT CHANGE UP (ref 32–36)
MCV RBC AUTO: 79.7 FL — LOW (ref 80–100)
NRBC # BLD: 0 /100 WBCS — SIGNIFICANT CHANGE UP (ref 0–0)
NRBC # FLD: 0 K/UL — SIGNIFICANT CHANGE UP (ref 0–0)
PHOSPHATE SERPL-MCNC: 4.4 MG/DL — SIGNIFICANT CHANGE UP (ref 2.5–4.5)
PLATELET # BLD AUTO: 151 K/UL — SIGNIFICANT CHANGE UP (ref 150–400)
POTASSIUM SERPL-MCNC: 5.4 MMOL/L — HIGH (ref 3.5–5.3)
POTASSIUM SERPL-SCNC: 5.4 MMOL/L — HIGH (ref 3.5–5.3)
RBC # BLD: 2.76 M/UL — LOW (ref 3.8–5.2)
RBC # FLD: 13.8 % — SIGNIFICANT CHANGE UP (ref 10.3–14.5)
SODIUM SERPL-SCNC: 133 MMOL/L — LOW (ref 135–145)
TIBC SERPL-MCNC: 269 UG/DL — SIGNIFICANT CHANGE UP (ref 220–430)
UIBC SERPL-MCNC: 183 UG/DL — SIGNIFICANT CHANGE UP (ref 110–370)
WBC # BLD: 7.84 K/UL — SIGNIFICANT CHANGE UP (ref 3.8–10.5)
WBC # FLD AUTO: 7.84 K/UL — SIGNIFICANT CHANGE UP (ref 3.8–10.5)

## 2023-08-17 PROCEDURE — 99232 SBSQ HOSP IP/OBS MODERATE 35: CPT | Mod: GC

## 2023-08-17 PROCEDURE — 99233 SBSQ HOSP IP/OBS HIGH 50: CPT | Mod: GC

## 2023-08-17 PROCEDURE — 99232 SBSQ HOSP IP/OBS MODERATE 35: CPT

## 2023-08-17 RX ADMIN — BUMETANIDE 1 MILLIGRAM(S): 0.25 INJECTION INTRAMUSCULAR; INTRAVENOUS at 05:01

## 2023-08-17 RX ADMIN — Medication 2: at 17:46

## 2023-08-17 RX ADMIN — Medication 100 MILLIGRAM(S): at 22:55

## 2023-08-17 RX ADMIN — Medication 1: at 09:17

## 2023-08-17 RX ADMIN — Medication 100 MILLIGRAM(S): at 14:59

## 2023-08-17 RX ADMIN — Medication 1300 MILLIGRAM(S): at 14:59

## 2023-08-17 RX ADMIN — Medication 125 MICROGRAM(S): at 05:03

## 2023-08-17 RX ADMIN — PANTOPRAZOLE SODIUM 40 MILLIGRAM(S): 20 TABLET, DELAYED RELEASE ORAL at 05:04

## 2023-08-17 RX ADMIN — Medication 1300 MILLIGRAM(S): at 22:56

## 2023-08-17 RX ADMIN — HEPARIN SODIUM 5000 UNIT(S): 5000 INJECTION INTRAVENOUS; SUBCUTANEOUS at 05:04

## 2023-08-17 RX ADMIN — Medication 1300 MILLIGRAM(S): at 05:04

## 2023-08-17 RX ADMIN — AMLODIPINE BESYLATE 5 MILLIGRAM(S): 2.5 TABLET ORAL at 05:02

## 2023-08-17 RX ADMIN — Medication 3: at 12:58

## 2023-08-17 RX ADMIN — Medication 100 MILLIGRAM(S): at 05:00

## 2023-08-17 RX ADMIN — HEPARIN SODIUM 5000 UNIT(S): 5000 INJECTION INTRAVENOUS; SUBCUTANEOUS at 17:46

## 2023-08-17 RX ADMIN — ATORVASTATIN CALCIUM 40 MILLIGRAM(S): 80 TABLET, FILM COATED ORAL at 22:56

## 2023-08-17 NOTE — DISCHARGE NOTE PROVIDER - CARE PROVIDER_API CALL
Marc Ospina  [facility]  [address] [address] [place] [address], [address] [place] [id number]  Phone: [phone]  Fax: [phone]  Scheduled Appointment: 09/07/2023 12:00 PM

## 2023-08-17 NOTE — PROGRESS NOTE ADULT - PROBLEM SELECTOR PLAN 1
Pt with ISAMAR on CKD likely in setting of HF exacerbation. Per [facility], baseline Scr ~2, admitted with Scr of 1.62, now increased to 2.48. At home, she was on bumex 1 mg PO BID, given 40 IV lasix on admission, then bumex 2mg IV BID on 8/12 and in am on 8/13.  Has a lung etiology also contributing to her SOB. She was admitted 8/4-8/7 with SOB/volume overload after a pRBC transfusion and may not have been completely optimized prior to DC on 8/11. Saturating well on RA. Case discussed with cardiology team. Planned for RHC. Resumed Bumex 1gm qd (8/16). Continue with Sodium Bicarbonate. Monitor BMP and I/O. Dose meds per eGFR and avoid nephrotoxic agents.

## 2023-08-17 NOTE — PROGRESS NOTE ADULT - SUBJECTIVE AND OBJECTIVE BOX
Interval History:  Patient resting comfortably in bed   Denies CP/SOB/palpitations/dizziness  No acute events overnight    Medications:  amLODIPine   Tablet 5 milliGRAM(s) Oral daily  atorvastatin 40 milliGRAM(s) Oral at bedtime  buMETAnide 1 milliGRAM(s) Oral daily  darbepoetin Injectable ViaL 60 MICROGram(s) SubCutaneous once  dextrose 5%. 1000 milliLiter(s) IV Continuous <Continuous>  dextrose 5%. 1000 milliLiter(s) IV Continuous <Continuous>  dextrose 50% Injectable 25 Gram(s) IV Push once  dextrose 50% Injectable 12.5 Gram(s) IV Push once  dextrose 50% Injectable 25 Gram(s) IV Push once  dextrose Oral Gel 15 Gram(s) Oral once PRN  glucagon  Injectable 1 milliGRAM(s) IntraMuscular once  guaiFENesin Oral Liquid (Sugar-Free) 200 milliGRAM(s) Oral every 6 hours PRN  heparin   Injectable 5000 Unit(s) SubCutaneous every 12 hours  hydrALAZINE 100 milliGRAM(s) Oral every 8 hours  insulin lispro (ADMELOG) corrective regimen sliding scale   SubCutaneous three times a day before meals  insulin lispro (ADMELOG) corrective regimen sliding scale   SubCutaneous at bedtime  levothyroxine 125 MICROGram(s) Oral daily  pantoprazole    Tablet 40 milliGRAM(s) Oral before breakfast  sodium bicarbonate 1300 milliGRAM(s) Oral three times a day      Vitals:  T(C): 36.3 (08-17-23 @ 05:00), Max: 36.4 (08-16-23 @ 13:30)  HR: 77 (08-17-23 @ 05:00) (66 - 78)  BP: 159/77 (08-17-23 @ 05:00) (140/62 - 159/77)  BP(mean): --  RR: 18 (08-17-23 @ 05:00) (17 - 18)  SpO2: 100% (08-17-23 @ 05:00) (96% - 100%)    Daily     Daily         I&O's Summary    16 Aug 2023 07:01  -  17 Aug 2023 07:00  --------------------------------------------------------  IN: 672 mL / OUT: 0 mL / NET: 672 mL        Physical Exam:  Appearance: No Acute Distress  Neck: JVP  Cardiovascular: Normal S1 S2  Respiratory: Clear to auscultation bilaterally  Gastrointestinal: Soft, Non-tender	  Skin: No cyanosis	  Neurologic: Non-focal  Extremities: BLE edema  Psychiatry: A & O x 3, Mood & affect appropriate    Labs:                        7.2    7.84  )-----------( 151      ( 17 Aug 2023 04:54 )             22.0     08-17    133<L>  |  97<L>  |  68<H>  ----------------------------<  176<H>  5.4<H>   |  19<L>  |  2.38<H>    Ca    9.2      17 Aug 2023 04:54  Phos  4.4     08-17  Mg     1.90     08-17          TELEMETRY: Sinus Rhythm     Echocardiogram:  Transthoracic Echocardiogram (08.06.23 @ 12:30)     DIMENSIONS:  Dimensions:     Normal Values:  LA:     3.6 cm    2.0 - 4.0 cm  Ao:     2.2 cm    2.0 - 3.8 cm  SEPTUM: 0.7 cm    0.6 - 1.2 cm  PWT:    0.9 cm    0.6 - 1.1 cm  LVIDd:  4.6 cm    3.0 - 5.6 cm  LVIDs:  2.6 cm    1.8 - 4.0 cm  Derived Variables:  LVMI: 72 g/m2  RWT: 0.39  Fractional short: 43 %  Ejection Fraction (Modified Macedo Rule): 69 %  ------------------------------------------------------------------------  OBSERVATIONS:  Mitral Valve: Normal mitral valve. Minimal mitral  regurgitation.  Aortic Root: Normal aortic root.  Aortic Valve: Normal trileaflet aortic valve.  Left Atrium: Moderately dilated left atrium.  LA volume  index = 45 cc/m2.  Left Ventricle: Normal left ventricular systolic function.  No segmental wall motion abnormalities. Normal left  ventricular internal dimensions and wall thicknesses.  (DT:152 ms).  Right Heart: Normal right atrium. Right ventricular  enlargement with normal right ventricular systolic  function. Normal tricuspid valve.  Moderate-severe  tricuspid regurgitation. Normal pulmonic valve. Moderate  pulmonic regurgitation.  Pericardium/PleuraNormal pericardium with trace pericardial  effusion. Right pleural effusion.  Hemodynamic: Estimated right ventricular systolic pressure  equals 65 mm Hg, assuming right atrial pressure equals 10  mm Hg, consistent with severe pulmonary hypertension.  ------------------------------------------------------------------------  CONCLUSIONS:  1. Normal left ventricular systolic function. No segmental  wall motion abnormalities.  2. Right ventricular enlargement with normal right  ventricular systolic function.  3. Normal tricuspid valve.Moderate-severe tricuspid  regurgitation.  4. Estimated pulmonary artery systolic pressure equals 65  mm Hg, assuming right atrial pressure equals 10  mm Hg,  consistent with severe pulmonary hypertension.  *** Compared with echocardiogram of 2/27/2023, no  significant changes noted.

## 2023-08-17 NOTE — DISCHARGE NOTE PROVIDER - NSDCMRMEDTOKEN_GEN_ALL_CORE_FT
amLODIPine 5 mg oral tablet: 1 orally  atorvastatin 40 mg oral tablet: 1 tab(s) orally once a day (at bedtime)  bumetanide 1 mg oral tablet: 1 tab(s) orally 2 times a day  hydrALAZINE 100 mg oral tablet: 1 tab(s) orally every 8 hours  levothyroxine 125 mcg (0.125 mg) oral tablet: 1 tab(s) orally once a day  pantoprazole 40 mg oral delayed release tablet: 1 tab(s) orally once a day (before a meal)  repaglinide 0.5 mg oral tablet: 1 tab(s) orally 3 times a day  sodium bicarbonate 650 mg oral tablet: 2 tab(s) orally 3 times a day   atorvastatin 40 mg oral tablet: 1 tab(s) orally once a day (at bedtime)  bumetanide 2 mg oral tablet: 1 tab(s) orally 2 times a day  hydrALAZINE 100 mg oral tablet: 1 tab(s) orally every 8 hours  isosorbide dinitrate 10 mg oral tablet: 1 tab(s) orally 3 times a day  levoFLOXacin 500 mg oral tablet: 1 tab(s) orally once a day Please take on 9/2/2023.  levoFLOXacin 750 mg oral tablet: 1 tab(s) orally once a day Please take on 8/31/2023.  levothyroxine 125 mcg (0.125 mg) oral tablet: 1 tab(s) orally once a day  NIFEdipine 60 mg oral tablet, extended release: 1 tab(s) orally once a day  pantoprazole 40 mg oral delayed release tablet: 1 tab(s) orally once a day (before a meal)  repaglinide 0.5 mg oral tablet: 1 tab(s) orally 3 times a day

## 2023-08-17 NOTE — DISCHARGE NOTE PROVIDER - NSDCCPCAREPLAN_GEN_ALL_CORE_FT
PRINCIPAL DISCHARGE DIAGNOSIS  Diagnosis: CHF exacerbation  Assessment and Plan of Treatment: You were admitted in the hospital for congestive heart failure. You were being treated with IV diuretics during your inpatient stay. On discharge, continue taking oral bumex 2mg twice a day, hydralazine 100mg three times a day, and isordil 10mg three times a day. Follow up heart failure Dr. Ospina outpatient on 9/7/2023 at 12pm.         SECONDARY DISCHARGE DIAGNOSES  Diagnosis: Acute kidney injury superimposed on CKD  Assessment and Plan of Treatment: You have some acute kidney injury. Follow up with nephrologist outpatient.    Diagnosis: Hypothyroid  Assessment and Plan of Treatment: Continue taking synthroid upon discharge.      Diagnosis: HTN (hypertension)  Assessment and Plan of Treatment: Low sodium and fat diet, continue anti-hypertensive medications, and follow up with primary care physician.    Diagnosis: Acute cellulitis  Assessment and Plan of Treatment: You were found to have acute cellulitis. You were treated with IV antibiotics. On discharge, complete antibiotics course with oral antibiotics.     PRINCIPAL DISCHARGE DIAGNOSIS  Diagnosis: Acute on chronic right-sided congestive heart failure  Assessment and Plan of Treatment: You were admitted in the hospital for congestive heart failure. You were being treated with IV diuretics during your inpatient stay. On discharge, continue taking oral bumex 2mg twice a day, hydralazine 100mg three times a day, and isordil 10mg three times a day. Follow up heart failure Dr. Ospina outpatient on 9/7/2023 at 12pm.      SECONDARY DISCHARGE DIAGNOSES  Diagnosis: Acute cellulitis  Assessment and Plan of Treatment: You were found to have acute cellulitis. You were treated with IV antibiotics. On discharge, complete antibiotics course with oral antibiotics.    Diagnosis: Acute kidney injury superimposed on CKD  Assessment and Plan of Treatment: You have some acute kidney injury. Follow up with nephrologist outpatient.    Diagnosis: Hypothyroid  Assessment and Plan of Treatment: Continue taking synthroid upon discharge.      Diagnosis: HTN (hypertension)  Assessment and Plan of Treatment: Low sodium and fat diet, continue anti-hypertensive medications, and follow up with primary care physician.

## 2023-08-17 NOTE — DISCHARGE NOTE PROVIDER - HOSPITAL COURSE
Patient is a 57yo F with PMH significant for HTN, HLD, T2DM, CKD stage 3-4, hypothyroidism (c/b myxedema coma), severe pHTN, mod-severe TR, and asthma who presented with progressively worsened dyspnea on exertion. She was seen by cardiology and given diuresis. Her kidney function had been improving but then began to worsen, and nephrology evaluated the patient. Diuresis was managed by nephrology and cardiology, and patient was also evaluated by heart failure for potential CardioMEMS vs RHC. 57 yo F w/ HTN, HLD, DM2, CKD (stage 3-4), hypothyroidism (c/b myxedema coma in past), severe pulmonary hypertension, mod-severe TR, asthma, presenting for acute shortness of breath found to be in CHF exacerbation.    Acute on chronic right-sided congestive heart failure.   Acute hypoxic respiratory failure - resolved s/p BIPAP, now on oxgen via NC, will wean as tolerated  - TTE 8/6/23 with LVEF 69%, mod-sev TR, severe pulmonary HTN, BNP 6228  - Given the discrepancy between LV function and class 2 pulm HTN, consulted cardio - appreciate input  - Strict I/O, daily weight. Pt has had multiple admissions for CHF, please document dry weight upon discharge  Repeat CXR 8/14 shows mild b/l pleural effusions.   - course was complicated by worsening fluid status, renal failure, hence IV bumex was switched to gtt  - s/p RHC 8/28, elevated PCWP, will need ongoing diuresis, f/u HF recs  - Appreciate ongoing nephrology, cardiology, heart failure input, f/u further recs  - Elevated transaminases likely due to congestive hepatopathy, will monitor.    Acute cellulitis.   Given L wrist erythema and swelling, c/w ancef   USx neg for DVT, wrist nodule noted likely IV site  c/w monitoring.    Acute renal failure superimposed on stage 3b chronic kidney disease.   - Pt with CKD 3-4, baseline cr approximately 2, now with ISAMAR on CKD  - Monitor renal function/UOP, avoid nephrotoxins  - Restarted bicarb tabs  - per renal   -Cr had decreased, now again risen. Nephrology consulted, appreciate input.   FEUrea consistent with intrinsic pathology.    Pulmonary HTN.   - Right heart cath on 1/23/2023 demonstrates PVR normal, elevated PCWP, mPAP 41, s/o group 2 PHTN  - Pulm notes from January 2023 reviewed. No evidence of fibrosis on CT  - s/p RHC 8/28.    Hypothyroid.   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  - Continue synthroid 125.    Hyponatremia.   ·likely due to HF  -Pt on IV bumex which might be causing worsening hyponatremia, Hypertonic saline per nephrology recs, will increase rate  -Will trend BMP daily  -F/u with nephrology.    Normocytic anemia.   - Admission earlier this month for symptomatic anemia, given 1U PRBC  - Prior iron studies demonstrate elevated ferritin, consistent with anemia of chronic disease with suspected component of anemia of CKD  - Hgb downtrended will transfuse prbc, over 6 hours to avoid fluid overload  - F/u with repeat CBC post-transfusion  - Transfuse if Hgb <7.    HTN (hypertension).   Uncontrolled HTN  - Nifedipine 60mg qd, can uptitrate as needed   - Continue hydral 100 q8.    DM (diabetes mellitus).     Plan: - Type 2 On repaglinide at home  - ISS here  - A1c 6.3 in June. Outpatient fructosamine corresponds to A1c 7.8.   57 yo F w/ HTN, HLD, DM2, CKD (stage 3-4), hypothyroidism (c/b myxedema coma in past), severe pulmonary hypertension, mod-severe TR, asthma, presenting for acute shortness of breath found to be in CHF exacerbation.    Acute on chronic right-sided congestive heart failure.   Acute hypoxic respiratory failure - resolved s/p BIPAP, now on oxgen via NC, will wean as tolerated  - TTE 8/6/23 with LVEF 69%, mod-sev TR, severe pulmonary HTN, BNP 6228  - Given the discrepancy between LV function and class 2 pulm HTN, consulted cardio - appreciate input  - course was complicated by worsening fluid status, renal failure, hence IV bumex was switched to gtt  - s/p RHC 8/28, elevated PCWP pt was diureses via bumex gtt with improvement.  - Elevated transaminases likely due to congestive hepatopathy, will monitor.    Acute cellulitis.   Given L wrist erythema and swelling, c/w ancef, transition to renally dosed levaquin outpt   USx neg for DVT, wrist nodule noted likely IV site      Acute renal failure superimposed on stage 3b chronic kidney disease.   - Pt with CKD 3-4, baseline cr approximately 2, now with ISAMAR on CKD  - Monitor renal function/UOP, avoid nephrotoxins  - Restarted bicarb tabs  - per renal   -Outpt nephrology follow up  FEUrea consistent with intrinsic pathology.    Pulmonary HTN.   - Right heart cath on 1/23/2023 demonstrates PVR normal, elevated PCWP, mPAP 41, s/o group 2 PHTN  - Pulm notes from January 2023 reviewed. No evidence of fibrosis on CT  - s/p RHC 8/28.    Hypothyroid.   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  - Continue synthroid 125.    Hyponatremia.   ·likely due to HF  improved with hypertonic saline    Normocytic anemia due ot renal disease   - Admission earlier this month for symptomatic anemia, given 1U PRBC  - Prior iron studies demonstrate elevated ferritin, consistent with anemia of chronic disease with suspected component of anemia of CKD  - Hgb downtrended will transfuse prbc, over 6 hours to avoid fluid overload  - F/u with repeat CBC post-transfusion  - Transfuse if Hgb <7.    HTN (hypertension).   Uncontrolled HTN  - Nifedipine 60mg qd, can uptitrate as needed   - Continue hydral 100 q8.    DM (diabetes mellitus).     Plan: - Type 2 On repaglinide at home  - ISS here  - A1c 6.3 in June. Outpatient fructosamine corresponds to A1c 7.8.

## 2023-08-17 NOTE — PROGRESS NOTE ADULT - SUBJECTIVE AND OBJECTIVE BOX
[facility] Division of Kidney Diseases & Hypertension  FOLLOW UP NOTE  [phone]--------------------------------------------------------------------------------  Chief Complaint:Heart failure  HPI: 58 y.o F w/ PMhx of HTN, DM, CKD (baseline Cr 2), hypothyroidism, pulmonary HTN here with SOB and found to be in decompensated heart failure exacerbation. Patient was recently discharged on 8/7 after she had an admission for a similar reason and improved with IV diuresis. Nephrology consulted for rising serum creatinine, Cr of 2.47. CXR with diffuse haziness, worse than before and BNP of ~6K vs 5K on 8/5. Initially needed bipap, now off.  Follows with Dr. Coyle outpatient.     24 hour events/subjective: Pt seen and examined at bedside. No acute complaints. Denies fevers, HA, SOB, CP, n/v, edema.    PAST HISTORY  --------------------------------------------------------------------------------  No significant changes to PMH, PSH, FHx, SHx, unless otherwise noted    ALLERGIES & MEDICATIONS  --------------------------------------------------------------------------------  Allergies  No Known Allergies  Intolerances    Standing Inpatient Medications  amLODIPine   Tablet 5 milliGRAM(s) Oral daily  atorvastatin 40 milliGRAM(s) Oral at bedtime  buMETAnide 1 milliGRAM(s) Oral daily  darbepoetin Injectable ViaL 60 MICROGram(s) SubCutaneous once  dextrose 5%. 1000 milliLiter(s) IV Continuous <Continuous>  dextrose 5%. 1000 milliLiter(s) IV Continuous <Continuous>  dextrose 50% Injectable 25 Gram(s) IV Push once  dextrose 50% Injectable 12.5 Gram(s) IV Push once  dextrose 50% Injectable 25 Gram(s) IV Push once  glucagon  Injectable 1 milliGRAM(s) IntraMuscular once  heparin   Injectable 5000 Unit(s) SubCutaneous every 12 hours  hydrALAZINE 100 milliGRAM(s) Oral every 8 hours  insulin lispro (ADMELOG) corrective regimen sliding scale   SubCutaneous three times a day before meals  insulin lispro (ADMELOG) corrective regimen sliding scale   SubCutaneous at bedtime  levothyroxine 125 MICROGram(s) Oral daily  pantoprazole    Tablet 40 milliGRAM(s) Oral before breakfast  sodium bicarbonate 1300 milliGRAM(s) Oral three times a day  PRN Inpatient Medications  dextrose Oral Gel 15 Gram(s) Oral once PRN  guaiFENesin Oral Liquid (Sugar-Free) 200 milliGRAM(s) Oral every 6 hours PRN    REVIEW OF SYSTEMS  --------------------------------------------------------------------------------  Gen: no fever  Respiratory: no sob  CV: no cp  GI: no pain, no n/v  : no complaints  MSK: no pain    VITALS/PHYSICAL EXAM  --------------------------------------------------------------------------------  T(C): 36.3 (08-17-23 @ 05:00), Max: 36.4 (08-16-23 @ 09:30)  HR: 77 (08-17-23 @ 05:00) (66 - 78)  BP: 159/77 (08-17-23 @ 05:00) (140/62 - 159/77)  RR: 18 (08-17-23 @ 05:00) (17 - 18)  SpO2: 100% (08-17-23 @ 05:00) (96% - 100%)  Wt(kg): --    08-16-23 @ 07:01  -  08-17-23 @ 07:00  --------------------------------------------------------  IN: 672 mL / OUT: 0 mL / NET: 672 mL    Physical Exam:  Gen: NAD  HEENT: Anicteric  Pulm: CTAB  CV: S1S2+  Abd: Soft, +BS    Ext: trace LE edema B/L  Neuro: Awake  Skin: Warm and dry    LABS/STUDIES  --------------------------------------------------------------------------------              7.2    6.25  >-----------<  141      [08-16-23 @ 11:18]              22.5     132  |  98  |  66  ----------------------------<  142      [08-16-23 @ 05:55]  4.9   |  22  |  2.38        Ca     9.0     [08-16-23 @ 05:55]      Mg     2.00     [08-16-23 @ 05:55]      Phos  4.7     [08-16-23 @ 05:55]    Creatinine Trend:  SCr 2.38 [08-16 @ 05:55]  SCr 2.48 [08-15 @ 05:40]  SCr 2.50 [08-14 @ 20:47]  SCr 2.51 [08-14 @ 05:00]  SCr 2.46 [08-13 @ 18:24]    Urine Creatinine 46      [08-14-23 @ 00:05]  Urine Protein 183      [08-14-23 @ 00:05]    TSH 4.82      [08-12-23 @ 00:43]

## 2023-08-17 NOTE — PROGRESS NOTE ADULT - PROBLEM SELECTOR PLAN 3
Anemia of chronic disease and thrombocytopenia. Iron studies reviewed, Tsat 25% Ferritin 753. DEA dosed 8/16. Recommend hematology evaluation. Recommend age appropriate screening. Monitor Hgb, transfusion per primary team.    Final recommendations pending attending signature.    If you have any questions, please feel free to contact me  Chay Koehler  Nephrology Fellow  [facility]/Page [phone]  (After 5pm or on weekends please page the on-call fellow)

## 2023-08-17 NOTE — PROGRESS NOTE ADULT - ASSESSMENT
58 year old Female with PMH of  HTN, HLD, DM2, asthma, CKD (baseline SCr ~2.0), hypothyroidism c/b myxedema coma in past), anemia requiring recent transfusions (followed by GI with plan for scope), and HFpEF with severe pulmonary hypertension (EF 64%;LVIDd 4.6 RVE with normal RV function, mod-severe TR and severe PH RVSP~65). Patient presented with c/o progressive worsening SOB/MARKS and 3 pillow orthopnea X 5days; treated in the ED with IV Lasix 40mg followed by IV Bumex 2mg daily on 8/12 and 8/13.        HF Consult request for diuretic management and CardioMEMS implant (discussed/reviewed with patient and booklet provided, she will consider as OP)         Pertinent Labs: BNP  6, 450     Lactate 1.0    Troponin levels    51/48    BUN/Cr  66/2.3    TSH: 4.8  T3/T4  56/1.9            K/L  (1/2023)  8.9/3.75 ratio 2.35    CXR: Mild bilateral pleural effusions    EKG:  Normal sinus rhythm, possible Left atrial enlargement    TTE: Normal LV systolic function, Right ventricular enlargement with normal RV systolic function, Moderate-severe TR and PASP equals 65 consistent with PH.    RHC (1/2023):  RA 17 PA 58/27/41  PCWP  22 CO/CI 8.8/5.3  and PVR 2.16 POWERS       Home Meds:   Bumex 1mg BID (currently on hold per Nephrology)   Hydralazine 100mg Q8H   Amlodipine 5mg daily

## 2023-08-17 NOTE — PROGRESS NOTE ADULT - SUBJECTIVE AND OBJECTIVE BOX
Cardiology Progress Note  ------------------------------------------------------------------------------------------  SUBJECTIVE:   Seen by HF yesterday, restarted on Bumex, pt will consider CardioMEMS outpatient  No events overnight. Denies CP, SOB or Palpitations.   Tele: SR  -------------------------------------------------------------------------------------------  ROS:  CV: chest pain (-), palpitation (-), orthopnea (-), PND (-), edema (-)  PULM: SOB (-), cough (-), wheezing (-), hemoptysis (-).   CONST: fever (-), chills (-) or fatigue (-)  GI: abdominal distension (-), abdominal pain (-) , nausea/vomiting (-), hematemesis, (-), melena (-), hematochezia (-)  : dysuria (-), frequency (-), hematuria (-).   NEURO: numbness (-), weakness (-), dizziness (-)  MSK: myalgia (-), joint pain (-)   SKIN: itching (-), rash (-)  HEENT:  visual changes (-); vertigo or throat pain (-);  neck stiffness (-)   Psych: change in mood (-), anxiety (-), depression (-)     All other review of systems is negative unless indicated above.   -------------------------------------------------------------------------------------------  VS:  T(F): 97.3 (08-17), Max: 97.6 (08-16)  HR: 77 (08-17) (66 - 78)  BP: 159/77 (08-17) (140/62 - 159/77)  RR: 18 (08-17)  SpO2: 100% (08-17)  I&O's Summary    16 Aug 2023 07:01  -  17 Aug 2023 07:00  --------------------------------------------------------  IN: 672 mL / OUT: 0 mL / NET: 672 mL      ------------------------------------------------------------------------------------------  PHYSICAL EXAM:  General: No acute distress. Awake and conversant.   Eyes: Normal conjunctiva, anicteric. Round symmetric pupils.   ENT: Hearing grossly intact. No nasal discharge.   Neck: Neck is supple. No masses or thyromegaly.   Respiratory: Respirations are non-labored. No wheezing.   Skin: Warm. No rashes or ulcers.   Psych: Alert and oriented. Cooperative, Appropriate mood and affect, Normal judgment.   CV: RRR, no MRG, no JVD, No lower extremity edema.   MSK: Normal ambulation. No clubbing or cyanosis.   Neuro: Sensation and CN II-XII grossly normal.  -------------------------------------------------------------------------------------------  LABS:                          7.2    7.84  )-----------( 151      ( 17 Aug 2023 04:54 )             22.0     08-16    132<L>  |  98  |  66<H>  ----------------------------<  142<H>  4.9   |  22  |  2.38<H>    Ca    9.0      16 Aug 2023 05:55  Phos  4.7     08-16  Mg     2.00     08-16        CARDIAC MARKERS ( 12 Aug 2023 06:49 )  51 ng/L / x     / x     / x     / x     / x      CARDIAC MARKERS ( 11 Aug 2023 19:40 )  48 ng/L / x     / x     / x     / x     / x                -------------------------------------------------------------------------------------------  Meds:  amLODIPine   Tablet 5 milliGRAM(s) Oral daily  atorvastatin 40 milliGRAM(s) Oral at bedtime  buMETAnide 1 milliGRAM(s) Oral daily  darbepoetin Injectable ViaL 60 MICROGram(s) SubCutaneous once  dextrose 5%. 1000 milliLiter(s) IV Continuous <Continuous>  dextrose 5%. 1000 milliLiter(s) IV Continuous <Continuous>  dextrose 50% Injectable 25 Gram(s) IV Push once  dextrose 50% Injectable 12.5 Gram(s) IV Push once  dextrose 50% Injectable 25 Gram(s) IV Push once  dextrose Oral Gel 15 Gram(s) Oral once PRN  glucagon  Injectable 1 milliGRAM(s) IntraMuscular once  guaiFENesin Oral Liquid (Sugar-Free) 200 milliGRAM(s) Oral every 6 hours PRN  heparin   Injectable 5000 Unit(s) SubCutaneous every 12 hours  hydrALAZINE 100 milliGRAM(s) Oral every 8 hours  insulin lispro (ADMELOG) corrective regimen sliding scale   SubCutaneous three times a day before meals  insulin lispro (ADMELOG) corrective regimen sliding scale   SubCutaneous at bedtime  levothyroxine 125 MICROGram(s) Oral daily  pantoprazole    Tablet 40 milliGRAM(s) Oral before breakfast  sodium bicarbonate 1300 milliGRAM(s) Oral three times a day    -------------------------------------------------------------------------------------------  Cardiovascular Diagnostic Testing:    ECG: sinus rhythm, normal axis, near low voltage limb leads, no significant ST deviations     Echo:   TTE  DIMENSIONS:  Dimensions:     Normal Values:  LA:     3.6 cm    2.0 - 4.0 cm  Ao:     2.2 cm    2.0 - 3.8 cm  SEPTUM: 0.7 cm    0.6 - 1.2 cm  PWT:    0.9 cm    0.6 - 1.1 cm  LVIDd:  4.6 cm    3.0 - 5.6 cm  LVIDs:  2.6 cm    1.8 - 4.0 cm  Derived Variables:  LVMI: 72 g/m2  RWT: 0.39  Fractional short: 43 %  Ejection Fraction (Modified Macedo Rule): 69 %  ------------------------------------------------------------------------  OBSERVATIONS:  Mitral Valve: Normal mitral valve. Minimal mitral  regurgitation.  Aortic Root: Normal aortic root.  Aortic Valve: Normal trileaflet aortic valve.  Left Atrium: Moderately dilated left atrium.  LA volume  index = 45 cc/m2.  Left Ventricle: Normal left ventricular systolic function.  No segmental wall motion abnormalities. Normal left  ventricular internal dimensions and wall thicknesses.  (DT:152 ms).  Right Heart: Normal right atrium. Right ventricular  enlargement with normal right ventricular systolic  function. Normal tricuspid valve.  Moderate-severe  tricuspid regurgitation. Normal pulmonic valve. Moderate  pulmonic regurgitation.  Pericardium/PleuraNormal pericardium with trace pericardial  effusion. Right pleural effusion.  Hemodynamic: Estimated right ventricular systolic pressure  equals 65 mm Hg, assuming right atrial pressure equals 10  mm Hg, consistent with severe pulmonary hypertension.  ------------------------------------------------------------------------  CONCLUSIONS:  1. Normal left ventricular systolic function. No segmental  wall motion abnormalities.  2. Right ventricular enlargement with normal right  ventricular systolic function.  3. Normal tricuspid valve. Moderate-severe tricuspid  regurgitation.  4. Estimated pulmonary artery systolic pressure equals 65  mm Hg, assuming right atrial pressure equals 10  mm Hg,  consistent with severe pulmonary hypertension.  *** Compared with echocardiogram of 2/27/2023, no  significant changes noted.    Stress Testing:    Cath:  RHC  Diagnostic Conclusions:   RA 17 mmHg. PA 58/27/41 mmHg. PCW 22 mmHg.   CO 8.79 L/min (Hgb 7.7). CI 5.33 L/min/m2.  dsc. PVR 2.16 Kaur.       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   Volume overload improved on diuretics, but now with resultant ISAMAR   1. Severe pHTN  - Restarted on diuretics, cont Bumex 1mg PO daily  2. HTN  - Cont Hydral 100mg q8, Amlodipine 5mg  3. ISAMAR  - Appreciate renal recs, Cr improving slightly  4. Acute on chronic HFpEF  - HF consulted for potential Cardiomems given multiple hospitalizations, patient will consider as outpatient      Chavo Zamora MD  Fellow Physician   Cardiology Inpatient Consult Service     Please check [email or web address] [email or web address] for cardiology service schedule and contact information.

## 2023-08-17 NOTE — DISCHARGE NOTE PROVIDER - NSDCFUSCHEDAPPT_GEN_ALL_CORE_FT
Adriana Recinos  [facility] Physician [facility]  ENDOCRIN [address]  Scheduled Appointment: 09/26/2023    Jonel Coyle  [facility]  NEPHRO [address]   Scheduled Appointment: 10/20/2023     Adriana Recinos  [facility] Physician [facility]  ENDOCRIN [address] [facility]  Scheduled Appointment: 09/26/2023    Jonel Coyle  [facility]  NEPHRO [address]   Scheduled Appointment: 10/20/2023    La Nena Gay  [facility]  CARDIOLOGY [id number]  Scheduled Appointment: 11/21/2023    Courtney Dao  [facility] Physician [facility]  PULMMED [address]  Scheduled Appointment: 11/28/2023

## 2023-08-17 NOTE — PROGRESS NOTE ADULT - SUBJECTIVE AND OBJECTIVE BOX
[facility] Division of Hospital Medicine  Rich[place] Saeed   Available via MS Teams  In house pager [phone]    SUBJECTIVE / OVERNIGHT EVENTS:  no acute events overnight  This morning continues to state she feels well  Apprehensive about any further procedures at this time    ADDITIONAL REVIEW OF SYSTEMS:    MEDICATIONS  (STANDING):  amLODIPine   Tablet 5 milliGRAM(s) Oral daily  atorvastatin 40 milliGRAM(s) Oral at bedtime  buMETAnide 1 milliGRAM(s) Oral daily  darbepoetin Injectable ViaL 60 MICROGram(s) SubCutaneous once  dextrose 5%. 1000 milliLiter(s) (100 mL/Hr) IV Continuous <Continuous>  dextrose 5%. 1000 milliLiter(s) (50 mL/Hr) IV Continuous <Continuous>  dextrose 50% Injectable 25 Gram(s) IV Push once  dextrose 50% Injectable 12.5 Gram(s) IV Push once  dextrose 50% Injectable 25 Gram(s) IV Push once  glucagon  Injectable 1 milliGRAM(s) IntraMuscular once  heparin   Injectable 5000 Unit(s) SubCutaneous every 12 hours  hydrALAZINE 100 milliGRAM(s) Oral every 8 hours  insulin lispro (ADMELOG) corrective regimen sliding scale   SubCutaneous three times a day before meals  insulin lispro (ADMELOG) corrective regimen sliding scale   SubCutaneous at bedtime  levothyroxine 125 MICROGram(s) Oral daily  pantoprazole    Tablet 40 milliGRAM(s) Oral before breakfast  sodium bicarbonate 1300 milliGRAM(s) Oral three times a day    MEDICATIONS  (PRN):  dextrose Oral Gel 15 Gram(s) Oral once PRN Blood Glucose LESS THAN 70 milliGRAM(s)/deciliter  guaiFENesin Oral Liquid (Sugar-Free) 200 milliGRAM(s) Oral every 6 hours PRN Cough      I&O's Summary    16 Aug 2023 07:01  -  17 Aug 2023 07:00  --------------------------------------------------------  IN: 672 mL / OUT: 0 mL / NET: 672 mL        PHYSICAL EXAM:  Vital Signs Last 24 Hrs  T(C): 36.3 (17 Aug 2023 05:00), Max: 36.4 (16 Aug 2023 13:30)  T(F): 97.3 (17 Aug 2023 05:00), Max: 97.5 (16 Aug 2023 13:30)  HR: 77 (17 Aug 2023 05:00) (66 - 78)  BP: 159/77 (17 Aug 2023 05:00) (140/62 - 159/77)  BP(mean): --  RR: 18 (17 Aug 2023 05:00) (17 - 18)  SpO2: 100% (17 Aug 2023 05:00) (96% - 100%)    Parameters below as of 17 Aug 2023 05:00  Patient On (Oxygen Delivery Method): room air      CONSTITUTIONAL: NAD, well-developed, well-groomed  EYES: PERRLA; conjunctiva and sclera clear  ENMT: Moist oral mucosa, no pharyngeal injection or exudates; normal dentition  NECK: Supple, no palpable masses; no thyromegaly  RESPIRATORY: Normal respiratory effort; lungs are clear to auscultation bilaterally  CARDIOVASCULAR: Regular rate and rhythm, normal S1 and S2, no murmur/rub/gallop; No lower extremity edema; Peripheral pulses are 2+ bilaterally  ABDOMEN: Nontender to palpation, normoactive bowel sounds, no rebound/guarding; No hepatosplenomegaly  MUSCULOSKELETAL: no clubbing or cyanosis of digits; no joint swelling or tenderness to palpation  PSYCH: A+O to person, place, and time; affect appropriate  NEUROLOGY: CN 2-12 are intact and symmetric; no gross sensory deficits   SKIN: No rashes; no palpable lesions    LABS:                        7.2    7.84  )-----------( 151      ( 17 Aug 2023 04:54 )             22.0     08-17    133<L>  |  97<L>  |  68<H>  ----------------------------<  176<H>  5.4<H>   |  19<L>  |  2.38<H>    Ca    9.2      17 Aug 2023 04:54  Phos  4.4     08-17  Mg     1.90     08-17            Urinalysis Basic - ( 17 Aug 2023 04:54 )    Color: x / Appearance: x / SG: x / pH: x  Gluc: 176 mg/dL / Ketone: x  / Bili: x / Urobili: x   Blood: x / Protein: x / Nitrite: x   Leuk Esterase: x / RBC: x / WBC x   Sq Epi: x / Non Sq Epi: x / Bacteria: x        SARS-CoV-2: NotDetec (11 Aug 2023 21:46)  SARS-CoV-2: NotDetec (22 Jun 2023 05:10)

## 2023-08-18 LAB
ANION GAP SERPL CALC-SCNC: 13 MMOL/L — SIGNIFICANT CHANGE UP (ref 7–14)
BUN SERPL-MCNC: 70 MG/DL — HIGH (ref 7–23)
CALCIUM SERPL-MCNC: 9.1 MG/DL — SIGNIFICANT CHANGE UP (ref 8.4–10.5)
CHLORIDE SERPL-SCNC: 98 MMOL/L — SIGNIFICANT CHANGE UP (ref 98–107)
CO2 SERPL-SCNC: 23 MMOL/L — SIGNIFICANT CHANGE UP (ref 22–31)
CREAT SERPL-MCNC: 2.36 MG/DL — HIGH (ref 0.5–1.3)
EGFR: 23 ML/MIN/1.73M2 — LOW
GLUCOSE BLDC GLUCOMTR-MCNC: 140 MG/DL — HIGH (ref 70–99)
GLUCOSE BLDC GLUCOMTR-MCNC: 256 MG/DL — HIGH (ref 70–99)
GLUCOSE BLDC GLUCOMTR-MCNC: 263 MG/DL — HIGH (ref 70–99)
GLUCOSE BLDC GLUCOMTR-MCNC: 278 MG/DL — HIGH (ref 70–99)
GLUCOSE SERPL-MCNC: 121 MG/DL — HIGH (ref 70–99)
HCT VFR BLD CALC: 20 % — CRITICAL LOW (ref 34.5–45)
HCT VFR BLD CALC: 20.8 % — CRITICAL LOW (ref 34.5–45)
HCT VFR BLD CALC: 25.6 % — LOW (ref 34.5–45)
HGB BLD-MCNC: 6.7 G/DL — CRITICAL LOW (ref 11.5–15.5)
HGB BLD-MCNC: 6.8 G/DL — CRITICAL LOW (ref 11.5–15.5)
HGB BLD-MCNC: 8.5 G/DL — LOW (ref 11.5–15.5)
MAGNESIUM SERPL-MCNC: 1.8 MG/DL — SIGNIFICANT CHANGE UP (ref 1.6–2.6)
MCHC RBC-ENTMCNC: 26 PG — LOW (ref 27–34)
MCHC RBC-ENTMCNC: 26.1 PG — LOW (ref 27–34)
MCHC RBC-ENTMCNC: 26.7 PG — LOW (ref 27–34)
MCHC RBC-ENTMCNC: 32.7 GM/DL — SIGNIFICANT CHANGE UP (ref 32–36)
MCHC RBC-ENTMCNC: 33.2 GM/DL — SIGNIFICANT CHANGE UP (ref 32–36)
MCHC RBC-ENTMCNC: 33.5 GM/DL — SIGNIFICANT CHANGE UP (ref 32–36)
MCV RBC AUTO: 77.5 FL — LOW (ref 80–100)
MCV RBC AUTO: 79.7 FL — LOW (ref 80–100)
MCV RBC AUTO: 80.5 FL — SIGNIFICANT CHANGE UP (ref 80–100)
NRBC # BLD: 0 /100 WBCS — SIGNIFICANT CHANGE UP (ref 0–0)
NRBC # FLD: 0 K/UL — SIGNIFICANT CHANGE UP (ref 0–0)
PHOSPHATE SERPL-MCNC: 5.2 MG/DL — HIGH (ref 2.5–4.5)
PLATELET # BLD AUTO: 148 K/UL — LOW (ref 150–400)
PLATELET # BLD AUTO: 148 K/UL — LOW (ref 150–400)
PLATELET # BLD AUTO: 171 K/UL — SIGNIFICANT CHANGE UP (ref 150–400)
POTASSIUM SERPL-MCNC: 4.8 MMOL/L — SIGNIFICANT CHANGE UP (ref 3.5–5.3)
POTASSIUM SERPL-SCNC: 4.8 MMOL/L — SIGNIFICANT CHANGE UP (ref 3.5–5.3)
RBC # BLD: 2.58 M/UL — LOW (ref 3.8–5.2)
RBC # BLD: 2.61 M/UL — LOW (ref 3.8–5.2)
RBC # BLD: 3.18 M/UL — LOW (ref 3.8–5.2)
RBC # FLD: 13.6 % — SIGNIFICANT CHANGE UP (ref 10.3–14.5)
RBC # FLD: 13.7 % — SIGNIFICANT CHANGE UP (ref 10.3–14.5)
RBC # FLD: 13.9 % — SIGNIFICANT CHANGE UP (ref 10.3–14.5)
SODIUM SERPL-SCNC: 134 MMOL/L — LOW (ref 135–145)
WBC # BLD: 7.8 K/UL — SIGNIFICANT CHANGE UP (ref 3.8–10.5)
WBC # BLD: 8.15 K/UL — SIGNIFICANT CHANGE UP (ref 3.8–10.5)
WBC # BLD: 9.85 K/UL — SIGNIFICANT CHANGE UP (ref 3.8–10.5)
WBC # FLD AUTO: 7.8 K/UL — SIGNIFICANT CHANGE UP (ref 3.8–10.5)
WBC # FLD AUTO: 8.15 K/UL — SIGNIFICANT CHANGE UP (ref 3.8–10.5)
WBC # FLD AUTO: 9.85 K/UL — SIGNIFICANT CHANGE UP (ref 3.8–10.5)

## 2023-08-18 PROCEDURE — 71045 X-RAY EXAM CHEST 1 VIEW: CPT | Mod: 26

## 2023-08-18 PROCEDURE — 99233 SBSQ HOSP IP/OBS HIGH 50: CPT | Mod: GC

## 2023-08-18 PROCEDURE — 99233 SBSQ HOSP IP/OBS HIGH 50: CPT

## 2023-08-18 RX ORDER — BUMETANIDE 0.25 MG/ML
1 INJECTION INTRAMUSCULAR; INTRAVENOUS
Refills: 0 | Status: DISCONTINUED | OUTPATIENT
Start: 2023-08-18 | End: 2023-08-21

## 2023-08-18 RX ORDER — BUMETANIDE 0.25 MG/ML
2 INJECTION INTRAMUSCULAR; INTRAVENOUS ONCE
Refills: 0 | Status: COMPLETED | OUTPATIENT
Start: 2023-08-18 | End: 2023-08-18

## 2023-08-18 RX ORDER — BUMETANIDE 0.25 MG/ML
1 INJECTION INTRAMUSCULAR; INTRAVENOUS ONCE
Refills: 0 | Status: DISCONTINUED | OUTPATIENT
Start: 2023-08-18 | End: 2023-08-18

## 2023-08-18 RX ADMIN — HEPARIN SODIUM 5000 UNIT(S): 5000 INJECTION INTRAVENOUS; SUBCUTANEOUS at 06:09

## 2023-08-18 RX ADMIN — Medication 3: at 12:54

## 2023-08-18 RX ADMIN — Medication 100 MILLIGRAM(S): at 06:08

## 2023-08-18 RX ADMIN — Medication 100 MILLIGRAM(S): at 13:41

## 2023-08-18 RX ADMIN — Medication 1300 MILLIGRAM(S): at 06:06

## 2023-08-18 RX ADMIN — Medication 125 MICROGRAM(S): at 06:07

## 2023-08-18 RX ADMIN — Medication 1300 MILLIGRAM(S): at 22:12

## 2023-08-18 RX ADMIN — Medication 100 MILLIGRAM(S): at 22:11

## 2023-08-18 RX ADMIN — BUMETANIDE 1 MILLIGRAM(S): 0.25 INJECTION INTRAMUSCULAR; INTRAVENOUS at 06:07

## 2023-08-18 RX ADMIN — HEPARIN SODIUM 5000 UNIT(S): 5000 INJECTION INTRAVENOUS; SUBCUTANEOUS at 17:46

## 2023-08-18 RX ADMIN — PANTOPRAZOLE SODIUM 40 MILLIGRAM(S): 20 TABLET, DELAYED RELEASE ORAL at 06:08

## 2023-08-18 RX ADMIN — Medication 3: at 18:00

## 2023-08-18 RX ADMIN — AMLODIPINE BESYLATE 5 MILLIGRAM(S): 2.5 TABLET ORAL at 06:07

## 2023-08-18 RX ADMIN — Medication 1300 MILLIGRAM(S): at 13:42

## 2023-08-18 RX ADMIN — ATORVASTATIN CALCIUM 40 MILLIGRAM(S): 80 TABLET, FILM COATED ORAL at 22:11

## 2023-08-18 RX ADMIN — BUMETANIDE 1 MILLIGRAM(S): 0.25 INJECTION INTRAMUSCULAR; INTRAVENOUS at 17:46

## 2023-08-18 RX ADMIN — BUMETANIDE 2 MILLIGRAM(S): 0.25 INJECTION INTRAMUSCULAR; INTRAVENOUS at 12:48

## 2023-08-18 NOTE — PROGRESS NOTE ADULT - PROBLEM SELECTOR PLAN 1
Pt with ISAMAR on CKD likely in setting of HF exacerbation. Per [facility], baseline Scr ~2, admitted with Scr of 1.62, now increased to 2.48. At home, she was on bumex 1 mg PO BID, given 40 IV lasix on admission, then bumex 2mg IV BID on 8/12 and in am on 8/13.  Has a lung etiology also contributing to her SOB. She was admitted 8/4-8/7 with SOB/volume overload after a pRBC transfusion and may not have been completely optimized prior to DC on 8/11. Now experiecing SOB again on NC, with bibasilar crackles. Recommend increasing bumex 1gm bid PO, and obtain CXR. Continue with Sodium Bicarbonate. Monitor BMP and I/O. Dose meds per eGFR and avoid nephrotoxic agents.

## 2023-08-18 NOTE — PROGRESS NOTE ADULT - PROBLEM SELECTOR PLAN 1
- Home regimen PO bumex 1mg PO BID  - BNP 6228, CXR with persistent small L pleural effusion but appears to be more hazy than CXR 8/4.  - TTE 8/6/23 with LVEF 69%, mod-sev TR, severe pulmonary HTN  - Weaned off BIPAP -> now on O2  - discontinued Bumex 2mg BID IV for now  - Given the discrepancy between LV function and class 2 pulm HTN, consulted cardio - appreciate input  - Strict I/O, daily weight. Pt has had multiple admissions for CHF, please document dry weight upon discharge    Repeat CXR 8/14 shows mild b/l pleural effusions.   Heart failure consulted, restarted diuresis with bumetanide  Today nephrology recommending IV diuresis given O2 requirements, bibasilar crackles. Additional IV diuresis given while getting PRBC transfusion for anemia.  Wean off O2 as tolerated.

## 2023-08-18 NOTE — PROGRESS NOTE ADULT - PROBLEM SELECTOR PLAN 3
Anemia of chronic disease and thrombocytopenia. Iron studies reviewed, Tsat 25% Ferritin 753. DEA dosed 8/16. Recommend hematology evaluation. Recommend age appropriate screening. Monitor Hgb, transfusion per primary team.    Final recommendations pending attending signature.    If you have any questions, please feel free to contact me  Chay Koehler  Nephrology Fellow  [employer]/Page [phone]  (After 5pm or on weekends please page the on-call fellow)

## 2023-08-18 NOTE — PROGRESS NOTE ADULT - SUBJECTIVE AND OBJECTIVE BOX
[facility] Division of Kidney Diseases & Hypertension  FOLLOW UP NOTE  [phone]--------------------------------------------------------------------------------  Chief Complaint:Heart failure    HPI: 58 y.o F w/ PMhx of HTN, DM, CKD (baseline Cr 2), hypothyroidism, pulmonary HTN here with SOB and found to be in decompensated heart failure exacerbation. Patient was recently discharged on 8/7 after she had an admission for a similar reason and improved with IV diuresis. Nephrology consulted for rising serum creatinine, Cr of 2.47. CXR with diffuse haziness, worse than before and BNP of ~6K vs 5K on 8/5. Initially needed bipap, now off.  Follows with Dr. Coyle outpatient.     24 hour events/subjective: Pt seen and examined at bedside. Report experiencing sob since yesterday and has to be placed on NA. No other complaints. Still unsure about RHC and states she needs to speak with her family before proceeding with procedure. Denies fevers, HA, CP, n/v, edema.    PAST HISTORY  --------------------------------------------------------------------------------  No significant changes to PMH, PSH, FHx, SHx, unless otherwise noted    ALLERGIES & MEDICATIONS  --------------------------------------------------------------------------------  Allergies  No Known Allergies  Intolerances    Standing Inpatient Medications  amLODIPine   Tablet 5 milliGRAM(s) Oral daily  atorvastatin 40 milliGRAM(s) Oral at bedtime  buMETAnide 1 milliGRAM(s) Oral daily  darbepoetin Injectable ViaL 60 MICROGram(s) SubCutaneous once  dextrose 5%. 1000 milliLiter(s) IV Continuous <Continuous>  dextrose 5%. 1000 milliLiter(s) IV Continuous <Continuous>  dextrose 50% Injectable 25 Gram(s) IV Push once  dextrose 50% Injectable 12.5 Gram(s) IV Push once  dextrose 50% Injectable 25 Gram(s) IV Push once  glucagon  Injectable 1 milliGRAM(s) IntraMuscular once  heparin   Injectable 5000 Unit(s) SubCutaneous every 12 hours  hydrALAZINE 100 milliGRAM(s) Oral every 8 hours  insulin lispro (ADMELOG) corrective regimen sliding scale   SubCutaneous three times a day before meals  insulin lispro (ADMELOG) corrective regimen sliding scale   SubCutaneous at bedtime  levothyroxine 125 MICROGram(s) Oral daily  pantoprazole    Tablet 40 milliGRAM(s) Oral before breakfast  sodium bicarbonate 1300 milliGRAM(s) Oral three times a day  PRN Inpatient Medications  dextrose Oral Gel 15 Gram(s) Oral once PRN  guaiFENesin Oral Liquid (Sugar-Free) 200 milliGRAM(s) Oral every 6 hours PRN    REVIEW OF SYSTEMS  --------------------------------------------------------------------------------  as above     VITALS/PHYSICAL EXAM  --------------------------------------------------------------------------------  T(C): 36.4 (08-17-23 @ 22:50), Max: 36.4 (08-17-23 @ 11:50)  HR: 73 (08-17-23 @ 22:50) (72 - 79)  BP: 170/68 (08-17-23 @ 22:50) (146/76 - 170/68)  RR: 17 (08-17-23 @ 22:50) (17 - 18)  SpO2: 98% (08-17-23 @ 22:50) (98% - 100%)  Wt(kg): --    Physical Exam:  Gen: NAD  HEENT: Anicteric  Pulm: bibasilar crackle, NC   CV: S1S2+  Abd: Soft, +BS    Ext: trace LE edema B/L  Neuro: Awake  Skin: Warm and dry    LABS/STUDIES  --------------------------------------------------------------------------------              7.2    7.84  >-----------<  151      [08-17-23 @ 04:54]              22.0     134  |  98  |  70  ----------------------------<  121      [08-18-23 @ 05:45]  4.8   |  23  |  2.36        Ca     9.1     [08-18-23 @ 05:45]      Mg     1.80     [08-18-23 @ 05:45]      Phos  5.2     [08-18-23 @ 05:45]    Creatinine Trend:  SCr 2.36 [08-18 @ 05:45]  SCr 2.38 [08-17 @ 04:54]  SCr 2.38 [08-16 @ 05:55]  SCr 2.48 [08-15 @ 05:40]  SCr 2.50 [08-14 @ 20:47]    Urine Creatinine 46      [08-14-23 @ 00:05]  Urine Protein 183      [08-14-23 @ 00:05]    Iron 86, TIBC 269, %sat 32      [08-17-23 @ 04:54]  Ferritin 984      [08-17-23 @ 04:54]  TSH 4.82      [08-12-23 @ 00:43]

## 2023-08-18 NOTE — PROGRESS NOTE ADULT - PROBLEM SELECTOR PLAN 2
Patient with elevated potassium on 8/17 mildy hemolyzed. Today K wnl. Continue with home sodium bicarbonate, as they are needed for long term kidney protection in patients with CKD, target serum bicarb>22. Sodium bicarb tabs can also help with potassium control. Monitor serum potassium. Ensure low potassium/renal diet.

## 2023-08-18 NOTE — PROGRESS NOTE ADULT - PROBLEM SELECTOR PLAN 5
- Admission earlier this month for symptomatic anemia, given 1U PRBC  - Prior iron studies demonstrate elevated ferritin, consistent with anemia of chronic disease with suspected component of anemia of CKD  - Pt needs to follow up with GI per last admission  - Hgb stable on admission  Today anemic to Hb <7  Offer PRBC transfusion (diurese prior to avoid hypervolemia/TACO)

## 2023-08-18 NOTE — PROGRESS NOTE ADULT - SUBJECTIVE AND OBJECTIVE BOX
[facility] Division of Hospital Medicine  Rich[place] SaeedDO  Available via MS Teams  In house pager [phone]    SUBJECTIVE / OVERNIGHT EVENTS:  Overnight complained of dyspnea, placed on NC.  Today feels better.    ADDITIONAL REVIEW OF SYSTEMS:    MEDICATIONS  (STANDING):  amLODIPine   Tablet 5 milliGRAM(s) Oral daily  atorvastatin 40 milliGRAM(s) Oral at bedtime  buMETAnide 1 milliGRAM(s) Oral two times a day  buMETAnide Injectable 2 milliGRAM(s) IV Push once  darbepoetin Injectable ViaL 60 MICROGram(s) SubCutaneous once  dextrose 5%. 1000 milliLiter(s) (100 mL/Hr) IV Continuous <Continuous>  dextrose 5%. 1000 milliLiter(s) (50 mL/Hr) IV Continuous <Continuous>  dextrose 50% Injectable 25 Gram(s) IV Push once  dextrose 50% Injectable 12.5 Gram(s) IV Push once  dextrose 50% Injectable 25 Gram(s) IV Push once  glucagon  Injectable 1 milliGRAM(s) IntraMuscular once  heparin   Injectable 5000 Unit(s) SubCutaneous every 12 hours  hydrALAZINE 100 milliGRAM(s) Oral every 8 hours  insulin lispro (ADMELOG) corrective regimen sliding scale   SubCutaneous three times a day before meals  insulin lispro (ADMELOG) corrective regimen sliding scale   SubCutaneous at bedtime  levothyroxine 125 MICROGram(s) Oral daily  pantoprazole    Tablet 40 milliGRAM(s) Oral before breakfast  sodium bicarbonate 1300 milliGRAM(s) Oral three times a day    MEDICATIONS  (PRN):  dextrose Oral Gel 15 Gram(s) Oral once PRN Blood Glucose LESS THAN 70 milliGRAM(s)/deciliter  guaiFENesin Oral Liquid (Sugar-Free) 200 milliGRAM(s) Oral every 6 hours PRN Cough      I&O's Summary      PHYSICAL EXAM:  Vital Signs Last 24 Hrs  T(C): 36.8 (18 Aug 2023 06:00), Max: 36.8 (18 Aug 2023 06:00)  T(F): 98.2 (18 Aug 2023 06:00), Max: 98.2 (18 Aug 2023 06:00)  HR: 80 (18 Aug 2023 06:00) (70 - 80)  BP: 138/60 (18 Aug 2023 06:00) (138/60 - 170/68)  BP(mean): --  RR: 18 (18 Aug 2023 06:00) (17 - 18)  SpO2: 100% (18 Aug 2023 06:00) (98% - 100%)    Parameters below as of 18 Aug 2023 06:00  Patient On (Oxygen Delivery Method): room air      CONSTITUTIONAL: NAD, well-developed, well-groomed  EYES: PERRLA; conjunctiva and sclera clear  ENMT: Moist oral mucosa, no pharyngeal injection or exudates; normal dentition  NECK: Supple, no palpable masses; no thyromegaly  RESPIRATORY: Normal respiratory effort; bibasilar crackles  CARDIOVASCULAR: Regular rate and rhythm, normal S1 and S2, no murmur/rub/gallop; No lower extremity edema; Peripheral pulses are 2+ bilaterally  ABDOMEN: Nontender to palpation, normoactive bowel sounds, no rebound/guarding; No hepatosplenomegaly  MUSCULOSKELETAL:  no clubbing or cyanosis of digits; no joint swelling or tenderness to palpation  PSYCH: A+O to person, place, and time; affect appropriate  NEUROLOGY: CN 2-12 are intact and symmetric; no gross sensory deficits   SKIN: No rashes; no palpable lesions    LABS:                        6.8    8.15  )-----------( 148      ( 18 Aug 2023 10:43 )             20.8     08-18    134<L>  |  98  |  70<H>  ----------------------------<  121<H>  4.8   |  23  |  2.36<H>    Ca    9.1      18 Aug 2023 05:45  Phos  5.2     08-18  Mg     1.80     08-18            Urinalysis Basic - ( 18 Aug 2023 05:45 )    Color: x / Appearance: x / SG: x / pH: x  Gluc: 121 mg/dL / Ketone: x  / Bili: x / Urobili: x   Blood: x / Protein: x / Nitrite: x   Leuk Esterase: x / RBC: x / WBC x   Sq Epi: x / Non Sq Epi: x / Bacteria: x        SARS-CoV-2: NotDetec (11 Aug 2023 21:46)  SARS-CoV-2: NotDetec (22 Jun 2023 05:10)

## 2023-08-19 LAB
ANION GAP SERPL CALC-SCNC: 11 MMOL/L — SIGNIFICANT CHANGE UP (ref 7–14)
BUN SERPL-MCNC: 77 MG/DL — HIGH (ref 7–23)
CALCIUM SERPL-MCNC: 9.7 MG/DL — SIGNIFICANT CHANGE UP (ref 8.4–10.5)
CHLORIDE SERPL-SCNC: 95 MMOL/L — LOW (ref 98–107)
CO2 SERPL-SCNC: 26 MMOL/L — SIGNIFICANT CHANGE UP (ref 22–31)
CREAT ?TM UR-MCNC: 27 MG/DL — SIGNIFICANT CHANGE UP
CREAT SERPL-MCNC: 2.7 MG/DL — HIGH (ref 0.5–1.3)
EGFR: 20 ML/MIN/1.73M2 — LOW
GLUCOSE BLDC GLUCOMTR-MCNC: 168 MG/DL — HIGH (ref 70–99)
GLUCOSE BLDC GLUCOMTR-MCNC: 262 MG/DL — HIGH (ref 70–99)
GLUCOSE BLDC GLUCOMTR-MCNC: 275 MG/DL — HIGH (ref 70–99)
GLUCOSE BLDC GLUCOMTR-MCNC: 282 MG/DL — HIGH (ref 70–99)
GLUCOSE SERPL-MCNC: 144 MG/DL — HIGH (ref 70–99)
HCT VFR BLD CALC: 24.1 % — LOW (ref 34.5–45)
HGB BLD-MCNC: 7.9 G/DL — LOW (ref 11.5–15.5)
MAGNESIUM SERPL-MCNC: 1.7 MG/DL — SIGNIFICANT CHANGE UP (ref 1.6–2.6)
MCHC RBC-ENTMCNC: 26.2 PG — LOW (ref 27–34)
MCHC RBC-ENTMCNC: 32.8 GM/DL — SIGNIFICANT CHANGE UP (ref 32–36)
MCV RBC AUTO: 80.1 FL — SIGNIFICANT CHANGE UP (ref 80–100)
NRBC # BLD: 0 /100 WBCS — SIGNIFICANT CHANGE UP (ref 0–0)
NRBC # FLD: 0 K/UL — SIGNIFICANT CHANGE UP (ref 0–0)
OSMOLALITY SERPL: 317 MOSM/KG — HIGH (ref 275–295)
OSMOLALITY UR: 347 MOSM/KG — SIGNIFICANT CHANGE UP (ref 50–1200)
PHOSPHATE SERPL-MCNC: 4.8 MG/DL — HIGH (ref 2.5–4.5)
PLATELET # BLD AUTO: 146 K/UL — LOW (ref 150–400)
POTASSIUM SERPL-MCNC: 4.9 MMOL/L — SIGNIFICANT CHANGE UP (ref 3.5–5.3)
POTASSIUM SERPL-SCNC: 4.9 MMOL/L — SIGNIFICANT CHANGE UP (ref 3.5–5.3)
RBC # BLD: 3.01 M/UL — LOW (ref 3.8–5.2)
RBC # FLD: 13.7 % — SIGNIFICANT CHANGE UP (ref 10.3–14.5)
SODIUM SERPL-SCNC: 132 MMOL/L — LOW (ref 135–145)
SODIUM UR-SCNC: 95 MMOL/L — SIGNIFICANT CHANGE UP
UUN UR-MCNC: 350.4 MG/DL — SIGNIFICANT CHANGE UP
WBC # BLD: 7.57 K/UL — SIGNIFICANT CHANGE UP (ref 3.8–10.5)
WBC # FLD AUTO: 7.57 K/UL — SIGNIFICANT CHANGE UP (ref 3.8–10.5)

## 2023-08-19 PROCEDURE — 99233 SBSQ HOSP IP/OBS HIGH 50: CPT | Mod: GC

## 2023-08-19 PROCEDURE — 99232 SBSQ HOSP IP/OBS MODERATE 35: CPT

## 2023-08-19 RX ORDER — NIFEDIPINE 30 MG
60 TABLET, EXTENDED RELEASE 24 HR ORAL DAILY
Refills: 0 | Status: DISCONTINUED | OUTPATIENT
Start: 2023-08-20 | End: 2023-08-30

## 2023-08-19 RX ADMIN — Medication 1300 MILLIGRAM(S): at 14:37

## 2023-08-19 RX ADMIN — PANTOPRAZOLE SODIUM 40 MILLIGRAM(S): 20 TABLET, DELAYED RELEASE ORAL at 05:47

## 2023-08-19 RX ADMIN — Medication 100 MILLIGRAM(S): at 21:32

## 2023-08-19 RX ADMIN — BUMETANIDE 1 MILLIGRAM(S): 0.25 INJECTION INTRAMUSCULAR; INTRAVENOUS at 05:44

## 2023-08-19 RX ADMIN — Medication 3: at 12:58

## 2023-08-19 RX ADMIN — Medication 1: at 09:00

## 2023-08-19 RX ADMIN — Medication 1300 MILLIGRAM(S): at 21:30

## 2023-08-19 RX ADMIN — HEPARIN SODIUM 5000 UNIT(S): 5000 INJECTION INTRAVENOUS; SUBCUTANEOUS at 05:47

## 2023-08-19 RX ADMIN — Medication 3: at 17:59

## 2023-08-19 RX ADMIN — AMLODIPINE BESYLATE 5 MILLIGRAM(S): 2.5 TABLET ORAL at 05:44

## 2023-08-19 RX ADMIN — Medication 100 MILLIGRAM(S): at 14:37

## 2023-08-19 RX ADMIN — ATORVASTATIN CALCIUM 40 MILLIGRAM(S): 80 TABLET, FILM COATED ORAL at 21:32

## 2023-08-19 RX ADMIN — HEPARIN SODIUM 5000 UNIT(S): 5000 INJECTION INTRAVENOUS; SUBCUTANEOUS at 18:00

## 2023-08-19 RX ADMIN — BUMETANIDE 1 MILLIGRAM(S): 0.25 INJECTION INTRAMUSCULAR; INTRAVENOUS at 14:38

## 2023-08-19 RX ADMIN — Medication 100 MILLIGRAM(S): at 05:44

## 2023-08-19 RX ADMIN — Medication 1300 MILLIGRAM(S): at 05:46

## 2023-08-19 RX ADMIN — Medication 125 MICROGRAM(S): at 05:44

## 2023-08-19 NOTE — PROGRESS NOTE ADULT - PROBLEM SELECTOR PLAN 2
- Right heart cath on 1/23/2023 demonstrates PVR normal, elevated PCWP, mPAP 41, s/o group 2 PHTN  - Pulm notes from January 2023 reviewed. No evidence of fibrosis on CT

## 2023-08-19 NOTE — PROGRESS NOTE ADULT - PROBLEM SELECTOR PLAN 2
Patient with elevated potassium on 8/17 mildy hemolyzed. Today K wnl. Continue with home sodium bicarbonate, as they are needed for long term kidney protection in patients with CKD, target serum bicarb>22. Sodium bicarb tabs can also help with potassium control. Monitor serum potassium. Ensure low potassium/renal diet. Patient with elevated potassium on 8/17 mildly hemolyzed. Today K wnl. Continue with home sodium bicarbonate. Monitor serum potassium. Low potassium/renal diet.

## 2023-08-19 NOTE — PROGRESS NOTE ADULT - PROBLEM SELECTOR PLAN 1
Pt with ISAMAR on CKD likely in setting of HF exacerbation and anemia. Per [facility], baseline Scr ~2, admitted with Scr of 1.62, now increased to 2.7. Continue with bumex 1gm bid PO. UA w/ 300 protein. UPCR 4. UOP not documented. Monitor BMP and I/O. Dose meds per eGFR and avoid nephrotoxic agents.

## 2023-08-19 NOTE — PROGRESS NOTE ADULT - PROBLEM SELECTOR PLAN 6
- Continue hydral 100 q8 and amlodipine 5 Uncontrolled HTN  - switch amlodpine to Nifedipine 60mg qd   - Continue hydral 100 q8

## 2023-08-19 NOTE — PROGRESS NOTE ADULT - PROBLEM SELECTOR PLAN 3
Anemia of chronic disease and thrombocytopenia. Iron studies reviewed, Tsat 25% Ferritin 753. DEA dosed 8/16. Recommend hematology evaluation. Recommend age appropriate screening. Monitor Hgb, transfusion per primary team.    Final recommendations pending attending signature.    If you have any questions, please feel free to contact me  Chay Koehler  Nephrology Fellow  [employer]/Page [phone]  (After 5pm or on weekends please page the on-call fellow) Anemia of chronic disease and thrombocytopenia. Iron studies reviewed, Tsat 25% Ferritin 753. DEA dosed 8/16. Management as per primary team.     If you have any questions, please feel free to contact me.  Ezequiel Sierra  Nephrology Fellow  [phone] / [phone] / [employer] Teams (Preferred)  (After 4pm or on weekends, please call the on-call Fellow)

## 2023-08-19 NOTE — PROGRESS NOTE ADULT - SUBJECTIVE AND OBJECTIVE BOX
PROGRESS NOTE:   Authoted by Dr. Cody Dang MD, YESI  Pager [phone] [facility], Available via [employer] Teams     Patient is a 58y old  Female who presents with a chief complaint of CHF Exacerbation (19 Aug 2023 10:47)    SUBJECTIVE / OVERNIGHT EVENTS:  No acute events overnight   Does not feel significantly differently with increased bumex dose.   No additional complaints.     MEDICATIONS  (STANDING):  amLODIPine   Tablet 5 milliGRAM(s) Oral daily  atorvastatin 40 milliGRAM(s) Oral at bedtime  buMETAnide 1 milliGRAM(s) Oral two times a day  darbepoetin Injectable ViaL 60 MICROGram(s) SubCutaneous once  dextrose 5%. 1000 milliLiter(s) (50 mL/Hr) IV Continuous <Continuous>  dextrose 5%. 1000 milliLiter(s) (100 mL/Hr) IV Continuous <Continuous>  dextrose 50% Injectable 25 Gram(s) IV Push once  dextrose 50% Injectable 12.5 Gram(s) IV Push once  dextrose 50% Injectable 25 Gram(s) IV Push once  glucagon  Injectable 1 milliGRAM(s) IntraMuscular once  heparin   Injectable 5000 Unit(s) SubCutaneous every 12 hours  hydrALAZINE 100 milliGRAM(s) Oral every 8 hours  insulin lispro (ADMELOG) corrective regimen sliding scale   SubCutaneous three times a day before meals  insulin lispro (ADMELOG) corrective regimen sliding scale   SubCutaneous at bedtime  levothyroxine 125 MICROGram(s) Oral daily  pantoprazole    Tablet 40 milliGRAM(s) Oral before breakfast  sodium bicarbonate 1300 milliGRAM(s) Oral three times a day    MEDICATIONS  (PRN):  dextrose Oral Gel 15 Gram(s) Oral once PRN Blood Glucose LESS THAN 70 milliGRAM(s)/deciliter  guaiFENesin Oral Liquid (Sugar-Free) 200 milliGRAM(s) Oral every 6 hours PRN Cough    OBJECTIVE:  Vital Signs Last 24 Hrs  T(C): 36.9 (19 Aug 2023 12:50), Max: 37.1 (19 Aug 2023 05:42)  T(F): 98.4 (19 Aug 2023 12:50), Max: 98.7 (19 Aug 2023 05:42)  HR: 72 (19 Aug 2023 12:50) (72 - 82)  BP: 148/62 (19 Aug 2023 12:50) (148/62 - 171/68)  RR: 18 (19 Aug 2023 12:50) (18 - 18)  SpO2: 97% (19 Aug 2023 12:50) (95% - 98%)    Parameters below as of 19 Aug 2023 12:50  Patient On (Oxygen Delivery Method): room air    I&O's Summary    18 Aug 2023 07:01  -  19 Aug 2023 07:00  --------------------------------------------------------  IN: 300 mL / OUT: 0 mL / NET: 300 mL    CONSTITUTIONAL: NAD  HEAD:  Atraumatic, Normocephalic  EYES: EOMI, conjunctiva and sclera clear  ENMT: No tonsillar erythema, exudates, or enlargement; Moist mucous membranes  NECK: Supple, +JVD  NERVOUS SYSTEM: AOX3, motor and sensation grossly intact in b/l UE and b/l LE  PSYCHIATRIC: Appropriate affect and mood  CHEST/LUNG: Clear to auscultation bilaterally  HEART: Regular rate and rhythm; No murmurs, rubs, or gallops. No LE edema  ABDOMEN: Soft, Nontender, Nondistended; Bowel sounds present  EXTREMITIES:  2+ Peripheral Pulses, No clubbing, cyanosis  SKIN: No rashes or lesions    LABS:                        7.9    7.57  )-----------( 146      ( 19 Aug 2023 07:01 )             24.1     08-19    132<L>  |  95<L>  |  77<H>  ----------------------------<  144<H>  4.9   |  26  |  2.70<H>    Ca    9.7      19 Aug 2023 07:01  Phos  4.8     08-19  Mg     1.70     08-19    Urinalysis Basic - ( 19 Aug 2023 07:01 )  Color: x / Appearance: x / SG: x / pH: x  Gluc: 144 mg/dL / Ketone: x  / Bili: x / Urobili: x   Blood: x / Protein: x / Nitrite: x   Leuk Esterase: x / RBC: x / WBC x   Sq Epi: x / Non Sq Epi: x / Bacteria: x    CAPILLARY BLOOD GLUCOSE  POCT Blood Glucose.: 282 mg/dL (19 Aug 2023 12:35)  POCT Blood Glucose.: 168 mg/dL (19 Aug 2023 08:58)  POCT Blood Glucose.: 278 mg/dL (18 Aug 2023 22:08)  POCT Blood Glucose.: 256 mg/dL (18 Aug 2023 17:59)

## 2023-08-19 NOTE — PROGRESS NOTE ADULT - PROBLEM SELECTOR PLAN 3
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  - Continue synthroid 125

## 2023-08-19 NOTE — PROGRESS NOTE ADULT - SUBJECTIVE AND OBJECTIVE BOX
[facility] Division of Kidney Diseases & Hypertension  FOLLOW UP NOTE  [phone]--------------------------------------------------------------------------------    Chief Complaint: ISAMAR on CKD    24 hour events/subjective: Pt seen and examined at bedside. Report breathing is better today. No other complaints. Denies fevers, HA, CP, SOB, n/v, edema.    PAST HISTORY  --------------------------------------------------------------------------------  No significant changes to PMH, PSH, FHx, SHx, unless otherwise noted    ALLERGIES & MEDICATIONS  --------------------------------------------------------------------------------  Allergies  No Known Allergies    Intolerances    Standing Inpatient Medications  amLODIPine   Tablet 5 milliGRAM(s) Oral daily  atorvastatin 40 milliGRAM(s) Oral at bedtime  buMETAnide 1 milliGRAM(s) Oral two times a day  darbepoetin Injectable ViaL 60 MICROGram(s) SubCutaneous once  dextrose 5%. 1000 milliLiter(s) IV Continuous <Continuous>  dextrose 5%. 1000 milliLiter(s) IV Continuous <Continuous>  dextrose 50% Injectable 25 Gram(s) IV Push once  dextrose 50% Injectable 12.5 Gram(s) IV Push once  dextrose 50% Injectable 25 Gram(s) IV Push once  glucagon  Injectable 1 milliGRAM(s) IntraMuscular once  heparin   Injectable 5000 Unit(s) SubCutaneous every 12 hours  hydrALAZINE 100 milliGRAM(s) Oral every 8 hours  insulin lispro (ADMELOG) corrective regimen sliding scale   SubCutaneous three times a day before meals  insulin lispro (ADMELOG) corrective regimen sliding scale   SubCutaneous at bedtime  levothyroxine 125 MICROGram(s) Oral daily  pantoprazole    Tablet 40 milliGRAM(s) Oral before breakfast  sodium bicarbonate 1300 milliGRAM(s) Oral three times a day    PRN Inpatient Medications  dextrose Oral Gel 15 Gram(s) Oral once PRN  guaiFENesin Oral Liquid (Sugar-Free) 200 milliGRAM(s) Oral every 6 hours PRN    REVIEW OF SYSTEMS  --------------------------------------------------------------------------------  All other systems were reviewed and are negative, except as noted.    VITALS/PHYSICAL EXAM  --------------------------------------------------------------------------------  T(C): 37.1 (08-19-23 @ 05:42), Max: 37.1 (08-19-23 @ 05:42)  HR: 74 (08-19-23 @ 05:42) (74 - 82)  BP: 171/68 (08-19-23 @ 05:42) (148/60 - 171/68)  RR: 18 (08-19-23 @ 05:42) (18 - 18)  SpO2: 97% (08-19-23 @ 05:42) (95% - 98%)  Wt(kg): --    08-18-23 @ 07:01  -  08-19-23 @ 07:00  --------------------------------------------------------  IN: 300 mL / OUT: 0 mL / NET: 300 mL    Physical Exam:  Gen: NAD  HEENT: Anicteric  Pulm: bibasilar crackle  CV: S1S2+  Abd: Soft, +BS    Ext: trace LE edema B/L  Neuro: Awake  Skin: Warm and dry    LABS/STUDIES  --------------------------------------------------------------------------------              7.9    7.57  >-----------<  146      [08-19-23 @ 07:01]              24.1     132  |  95  |  77  ----------------------------<  144      [08-19-23 @ 07:01]  4.9   |  26  |  2.70        Ca     9.7     [08-19-23 @ 07:01]      Mg     1.70     [08-19-23 @ 07:01]      Phos  4.8     [08-19-23 @ 07:01]    Creatinine Trend:  SCr 2.70 [08-19 @ 07:01]  SCr 2.36 [08-18 @ 05:45]  SCr 2.38 [08-17 @ 04:54]  SCr 2.38 [08-16 @ 05:55]  SCr 2.48 [08-15 @ 05:40]    Urinalysis - [08-19-23 @ 07:01]      Color  / Appearance  / SG  / pH       Gluc 144 / Ketone   / Bili  / Urobili        Blood  / Protein  / Leuk Est  / Nitrite       RBC  / WBC  / Hyaline  / Gran  / Sq Epi  / Non Sq Epi  / Bacteria     Urine Creatinine 46      [08-14-23 @ 00:05]  Urine Protein 183      [08-14-23 @ 00:05]    Iron 86, TIBC 269, %sat 32      [08-17-23 @ 04:54]  Ferritin 984      [08-17-23 @ 04:54]

## 2023-08-19 NOTE — PROGRESS NOTE ADULT - PROBLEM SELECTOR PLAN 1
- Home regimen PO bumex 1mg PO BID  - BNP 6228  - TTE 8/6/23 with LVEF 69%, mod-sev TR, severe pulmonary HTN  - Weaned off BIPAP -> now on O2  - Given the discrepancy between LV function and class 2 pulm HTN, consulted cardio - appreciate input  - Strict I/O, daily weight. Pt has had multiple admissions for CHF, please document dry weight upon discharge  Repeat CXR 8/14 shows mild b/l pleural effusions.   Heart failure consulted, restarted diuresis with bumetanide (also considering CardioMEMS)   She received additional IV bumex yesterday per nephrology recs while getting PRBC transfusion for anemia.  Weaned off O2   - complicated by worsening renal function. Will continue home dose bumex for now

## 2023-08-19 NOTE — PROGRESS NOTE ADULT - PROBLEM SELECTOR PLAN 5
- Admission earlier this month for symptomatic anemia, given 1U PRBC  - Prior iron studies demonstrate elevated ferritin, consistent with anemia of chronic disease with suspected component of anemia of CKD  - Pt needs to follow up with GI per last admission  - Hgb stable on admission  s/p PRBC transfusion

## 2023-08-20 LAB
ANION GAP SERPL CALC-SCNC: 14 MMOL/L — SIGNIFICANT CHANGE UP (ref 7–14)
BUN SERPL-MCNC: 76 MG/DL — HIGH (ref 7–23)
CALCIUM SERPL-MCNC: 9.4 MG/DL — SIGNIFICANT CHANGE UP (ref 8.4–10.5)
CHLORIDE SERPL-SCNC: 97 MMOL/L — LOW (ref 98–107)
CO2 SERPL-SCNC: 23 MMOL/L — SIGNIFICANT CHANGE UP (ref 22–31)
CREAT SERPL-MCNC: 2.84 MG/DL — HIGH (ref 0.5–1.3)
EGFR: 19 ML/MIN/1.73M2 — LOW
GLUCOSE BLDC GLUCOMTR-MCNC: 154 MG/DL — HIGH (ref 70–99)
GLUCOSE BLDC GLUCOMTR-MCNC: 221 MG/DL — HIGH (ref 70–99)
GLUCOSE BLDC GLUCOMTR-MCNC: 271 MG/DL — HIGH (ref 70–99)
GLUCOSE BLDC GLUCOMTR-MCNC: 333 MG/DL — HIGH (ref 70–99)
GLUCOSE SERPL-MCNC: 127 MG/DL — HIGH (ref 70–99)
HCT VFR BLD CALC: 23.4 % — LOW (ref 34.5–45)
HGB BLD-MCNC: 7.9 G/DL — LOW (ref 11.5–15.5)
MAGNESIUM SERPL-MCNC: 1.7 MG/DL — SIGNIFICANT CHANGE UP (ref 1.6–2.6)
MCHC RBC-ENTMCNC: 27 PG — SIGNIFICANT CHANGE UP (ref 27–34)
MCHC RBC-ENTMCNC: 33.8 GM/DL — SIGNIFICANT CHANGE UP (ref 32–36)
MCV RBC AUTO: 79.9 FL — LOW (ref 80–100)
NRBC # BLD: 0 /100 WBCS — SIGNIFICANT CHANGE UP (ref 0–0)
NRBC # FLD: 0 K/UL — SIGNIFICANT CHANGE UP (ref 0–0)
PHOSPHATE SERPL-MCNC: 4.9 MG/DL — HIGH (ref 2.5–4.5)
PLATELET # BLD AUTO: 155 K/UL — SIGNIFICANT CHANGE UP (ref 150–400)
POTASSIUM SERPL-MCNC: 4.8 MMOL/L — SIGNIFICANT CHANGE UP (ref 3.5–5.3)
POTASSIUM SERPL-SCNC: 4.8 MMOL/L — SIGNIFICANT CHANGE UP (ref 3.5–5.3)
RBC # BLD: 2.93 M/UL — LOW (ref 3.8–5.2)
RBC # FLD: 13.9 % — SIGNIFICANT CHANGE UP (ref 10.3–14.5)
SODIUM SERPL-SCNC: 134 MMOL/L — LOW (ref 135–145)
WBC # BLD: 7.5 K/UL — SIGNIFICANT CHANGE UP (ref 3.8–10.5)
WBC # FLD AUTO: 7.5 K/UL — SIGNIFICANT CHANGE UP (ref 3.8–10.5)

## 2023-08-20 PROCEDURE — 99232 SBSQ HOSP IP/OBS MODERATE 35: CPT

## 2023-08-20 RX ORDER — INSULIN LISPRO 100/ML
1 VIAL (ML) SUBCUTANEOUS ONCE
Refills: 0 | Status: COMPLETED | OUTPATIENT
Start: 2023-08-20 | End: 2023-08-20

## 2023-08-20 RX ADMIN — HEPARIN SODIUM 5000 UNIT(S): 5000 INJECTION INTRAVENOUS; SUBCUTANEOUS at 17:23

## 2023-08-20 RX ADMIN — Medication 2: at 11:47

## 2023-08-20 RX ADMIN — Medication 1: at 08:51

## 2023-08-20 RX ADMIN — Medication 125 MICROGRAM(S): at 05:56

## 2023-08-20 RX ADMIN — Medication 3: at 17:22

## 2023-08-20 RX ADMIN — Medication 1 UNIT(S): at 22:40

## 2023-08-20 RX ADMIN — HEPARIN SODIUM 5000 UNIT(S): 5000 INJECTION INTRAVENOUS; SUBCUTANEOUS at 05:57

## 2023-08-20 RX ADMIN — ATORVASTATIN CALCIUM 40 MILLIGRAM(S): 80 TABLET, FILM COATED ORAL at 21:48

## 2023-08-20 RX ADMIN — Medication 100 MILLIGRAM(S): at 13:50

## 2023-08-20 RX ADMIN — Medication 100 MILLIGRAM(S): at 05:55

## 2023-08-20 RX ADMIN — BUMETANIDE 1 MILLIGRAM(S): 0.25 INJECTION INTRAMUSCULAR; INTRAVENOUS at 05:56

## 2023-08-20 RX ADMIN — Medication 60 MILLIGRAM(S): at 05:56

## 2023-08-20 RX ADMIN — BUMETANIDE 1 MILLIGRAM(S): 0.25 INJECTION INTRAMUSCULAR; INTRAVENOUS at 13:50

## 2023-08-20 RX ADMIN — Medication 100 MILLIGRAM(S): at 21:45

## 2023-08-20 RX ADMIN — Medication 1300 MILLIGRAM(S): at 05:56

## 2023-08-20 RX ADMIN — Medication 1300 MILLIGRAM(S): at 21:48

## 2023-08-20 RX ADMIN — Medication 1300 MILLIGRAM(S): at 13:50

## 2023-08-20 RX ADMIN — PANTOPRAZOLE SODIUM 40 MILLIGRAM(S): 20 TABLET, DELAYED RELEASE ORAL at 06:00

## 2023-08-20 NOTE — PROGRESS NOTE ADULT - PROBLEM SELECTOR PLAN 2
- Right heart cath on 1/23/2023 demonstrates PVR normal, elevated PCWP, mPAP 41, s/o group 2 PHTN  - Pulm notes from January 2023 reviewed. No evidence of fibrosis on CT  - can consider repeat RHC given worsening renal function with diuretics

## 2023-08-20 NOTE — DIETITIAN INITIAL EVALUATION ADULT - NS FNS DIET ORDER
Diet, DASH/TLC:   Sodium & Cholesterol Restricted  Consistent Carbohydrate {No Snacks} (CSTCHO)  For patients receiving Renal Replacement - No Protein Restr, No Conc K, No Conc Phos, Low Sodium (RENAL)  No Concentrated Potassium  No Beef  No Pork (08-13-23 @ 15:59)

## 2023-08-20 NOTE — DIETITIAN INITIAL EVALUATION ADULT - OTHER INFO
57 yo F w/ HTN, HLD, DM2, CKD (stage 3-4), hypothyroidism (c/b myxedema coma in past), severe pulmonary hypertension, mod-severe TR, asthma, presenting for acute shortness of breath found to be in CHF exacerbation.    Pt seen and reports 75% intake of meals with No GI distress (nausea/vomiting/diarrhea/constipation.) at present. UBW- 123# per pt. Noted skin ecchymosis, no edema per nursing flow sheet. Labs reviewed. A1c- 6.3% (6/23/23).

## 2023-08-20 NOTE — DIETITIAN INITIAL EVALUATION ADULT - PERTINENT MEDS FT
MEDICATIONS  (STANDING):  atorvastatin 40 milliGRAM(s) Oral at bedtime  buMETAnide 1 milliGRAM(s) Oral two times a day  darbepoetin Injectable ViaL 60 MICROGram(s) SubCutaneous once  dextrose 5%. 1000 milliLiter(s) (50 mL/Hr) IV Continuous <Continuous>  dextrose 5%. 1000 milliLiter(s) (100 mL/Hr) IV Continuous <Continuous>  dextrose 50% Injectable 25 Gram(s) IV Push once  dextrose 50% Injectable 25 Gram(s) IV Push once  dextrose 50% Injectable 12.5 Gram(s) IV Push once  glucagon  Injectable 1 milliGRAM(s) IntraMuscular once  heparin   Injectable 5000 Unit(s) SubCutaneous every 12 hours  hydrALAZINE 100 milliGRAM(s) Oral every 8 hours  insulin lispro (ADMELOG) corrective regimen sliding scale   SubCutaneous at bedtime  insulin lispro (ADMELOG) corrective regimen sliding scale   SubCutaneous three times a day before meals  levothyroxine 125 MICROGram(s) Oral daily  NIFEdipine XL 60 milliGRAM(s) Oral daily  pantoprazole    Tablet 40 milliGRAM(s) Oral before breakfast  sodium bicarbonate 1300 milliGRAM(s) Oral three times a day    MEDICATIONS  (PRN):  dextrose Oral Gel 15 Gram(s) Oral once PRN Blood Glucose LESS THAN 70 milliGRAM(s)/deciliter  guaiFENesin Oral Liquid (Sugar-Free) 200 milliGRAM(s) Oral every 6 hours PRN Cough

## 2023-08-20 NOTE — DIETITIAN INITIAL EVALUATION ADULT - PROBLEM SELECTOR PLAN 1
- Home regimen PO bumex 1mg PO BID  - BNP 6228, CXR with persistent small L pleural effusion but appears to be more hazy than CXR 8/4.  - TTE 8/6/23 with LVEF 69%, mod-sev TR, severe pulmonary HTN  - Wean BIPAP  - Diurese with Bumex 2mg BID IV for now, transition to PO  - Given the discrepancy between LV function and class 2 pulm HTN, would recommend heart failure consult in am.  - Strict I/O, daily weight. Pt has had multiple admissions for CHF, please document dry weight upon discharge

## 2023-08-20 NOTE — PROGRESS NOTE ADULT - SUBJECTIVE AND OBJECTIVE BOX
PROGRESS NOTE:   Authoted by Dr. Cody Dang MD, YESI  Pager [phone] [facility], Available via [employer] Teams     Patient is a 58y old  Female who presents with a chief complaint of Heart failure (20 Aug 2023 09:58)    SUBJECTIVE / OVERNIGHT EVENTS:  No acute events overnight   No subjective complaints    MEDICATIONS  (STANDING):  atorvastatin 40 milliGRAM(s) Oral at bedtime  buMETAnide 1 milliGRAM(s) Oral two times a day  darbepoetin Injectable ViaL 60 MICROGram(s) SubCutaneous once  dextrose 5%. 1000 milliLiter(s) (50 mL/Hr) IV Continuous <Continuous>  dextrose 5%. 1000 milliLiter(s) (100 mL/Hr) IV Continuous <Continuous>  dextrose 50% Injectable 25 Gram(s) IV Push once  dextrose 50% Injectable 12.5 Gram(s) IV Push once  dextrose 50% Injectable 25 Gram(s) IV Push once  glucagon  Injectable 1 milliGRAM(s) IntraMuscular once  heparin   Injectable 5000 Unit(s) SubCutaneous every 12 hours  hydrALAZINE 100 milliGRAM(s) Oral every 8 hours  insulin lispro (ADMELOG) corrective regimen sliding scale   SubCutaneous three times a day before meals  insulin lispro (ADMELOG) corrective regimen sliding scale   SubCutaneous at bedtime  levothyroxine 125 MICROGram(s) Oral daily  NIFEdipine XL 60 milliGRAM(s) Oral daily  pantoprazole    Tablet 40 milliGRAM(s) Oral before breakfast  sodium bicarbonate 1300 milliGRAM(s) Oral three times a day    MEDICATIONS  (PRN):  dextrose Oral Gel 15 Gram(s) Oral once PRN Blood Glucose LESS THAN 70 milliGRAM(s)/deciliter  guaiFENesin Oral Liquid (Sugar-Free) 200 milliGRAM(s) Oral every 6 hours PRN Cough    OBJECTIVE:  Vital Signs Last 24 Hrs  T(C): 36.4 (20 Aug 2023 11:35), Max: 36.9 (19 Aug 2023 17:55)  T(F): 97.6 (20 Aug 2023 11:35), Max: 98.4 (19 Aug 2023 17:55)  HR: 69 (20 Aug 2023 11:35) (69 - 79)  BP: 125/60 (20 Aug 2023 11:35) (125/60 - 158/70)  RR: 18 (20 Aug 2023 11:35) (18 - 18)  SpO2: 100% (20 Aug 2023 11:35) (97% - 100%)    Parameters below as of 20 Aug 2023 11:35  Patient On (Oxygen Delivery Method): room air    I&O's Summary    19 Aug 2023 07:01  -  20 Aug 2023 07:00  --------------------------------------------------------  IN: 575 mL / OUT: 1500 mL / NET: -925 mL    20 Aug 2023 07:01  -  20 Aug 2023 14:16  --------------------------------------------------------  IN: 236 mL / OUT: 0 mL / NET: 236 mL    CONSTITUTIONAL: NAD  HEAD:  Atraumatic, Normocephalic  EYES: EOMI, conjunctiva and sclera clear  ENMT: No tonsillar erythema, exudates, or enlargement; Moist mucous membranes  NECK: Supple, No JVD  NERVOUS SYSTEM: AOX3, motor and sensation grossly intact in b/l UE and b/l LE  PSYCHIATRIC: Appropriate affect and mood  CHEST/LUNG: Clear to auscultation bilaterally; No rales, rhonchi, wheezing, or rubs  HEART: Regular rate and rhythm; No murmurs, rubs, or gallops. No LE edema  ABDOMEN: Soft, Nontender, Nondistended; Bowel sounds present  EXTREMITIES:  2+ Peripheral Pulses, No clubbing, cyanosis  SKIN: No rashes or lesions    LABS:                        7.9    7.50  )-----------( 155      ( 20 Aug 2023 06:02 )             23.4     08-20    134<L>  |  97<L>  |  76<H>  ----------------------------<  127<H>  4.8   |  23  |  2.84<H>    Ca    9.4      20 Aug 2023 06:02  Phos  4.9     08-20  Mg     1.70     08-20    Urinalysis Basic - ( 20 Aug 2023 06:02 )    Color: x / Appearance: x / SG: x / pH: x  Gluc: 127 mg/dL / Ketone: x  / Bili: x / Urobili: x   Blood: x / Protein: x / Nitrite: x   Leuk Esterase: x / RBC: x / WBC x   Sq Epi: x / Non Sq Epi: x / Bacteria: x    CAPILLARY BLOOD GLUCOSE  POCT Blood Glucose.: 221 mg/dL (20 Aug 2023 11:43)  POCT Blood Glucose.: 154 mg/dL (20 Aug 2023 08:49)  POCT Blood Glucose.: 275 mg/dL (19 Aug 2023 21:35)  POCT Blood Glucose.: 262 mg/dL (19 Aug 2023 17:58)

## 2023-08-20 NOTE — PROGRESS NOTE ADULT - PROBLEM SELECTOR PLAN 1
BNP 6228  - TTE 8/6/23 with LVEF 69%, mod-sev TR, severe pulmonary HTN  - Weaned off BIPAP -> now on O2  - Given the discrepancy between LV function and class 2 pulm HTN, consulted cardio - appreciate input  - Strict I/O, daily weight. Pt has had multiple admissions for CHF, please document dry weight upon discharge  Repeat CXR 8/14 shows mild b/l pleural effusions.   Heart failure consulted, restarted diuresis with bumetanide (also considering CardioMEMS)   She received additional IV bumex per nephrology recs while getting PRBC transfusion for anemia.  Weaned off O2   - complicated by worsening renal function. Will continue home dose bumex for now per nephrology recs

## 2023-08-20 NOTE — DIETITIAN INITIAL EVALUATION ADULT - ADD RECOMMEND
Honor food and beverage preferences within diet restriction of patient in an effort to maximize level of nutrient intake   Monitor PO intake, tolerance to nutrition supplement, weight trends, nutrition related lab values, BMs/GI distress, hydration status, skin integrity.  [place]  Progress Note Bianca Rodriguez Astheimer 1948, 69 y o  male MRN: [id number]  Unit/Bed#: -Melissa Encounter: [id number]  Primary Care Provider: Isabel Alex DO   Date and time admitted to hospital: 1/12/2023  6:33 AM    * Hypoxia  Assessment & Plan  · SpO2 88% on RA - improved to 98% on 2 L NC --> currently weaned to RA (89-95% on RA today)  · Not on O2 at baseline  · CXR unremarkable   · Procalcitonin normal  · Suspect related COVID infection, COPD exac  · Wean O2 as able, see individual problems   · Home O2 eval tomorrow     Chronic obstructive pulmonary disease with acute exacerbation (HCC)  Assessment & Plan  · History of tobacco use  · Not chronically on O2  · Exacerbation in setting of COVID infection  · Continue IV solumedrol BID today, hopeful transition to PO tomorrow   · scheduled neb treatments, respiratory protocol   · Zithromax x3 days    Syncope  Assessment & Plan  · Presented to the ED with witnessed syncopal episode at home, reportedly no head strike  · Check orthostatic BP q shift  · Negative orthostatics on 1/12 after IVF, repeat today   · Echo 10/2022: EF 26% grade 1 diastolic dysfunction  No significant valvular disease  · Suspect related to COVID infection + underlying orthostatic hypotension vs bradycardia  · Patient has been on 30 mg propranolol TID for parkinson's/tremor  Wife notes he has had issues with low HR and syncope in the past requiring dose reduction  · Asymptomatic bradycardia today 1/14 with HR 43 in early am --> increased to 70-80s after am propranolol was held   · Will reduce propranolol to 20 mg TID and monitor   · EKG- sinus bradycardia w sinus arrhythmia  · Monitor telemetry     COVID-19  Assessment & Plan  · Presented to the ED following syncopal episode    Last few days has been having cough/congestion, fever  · Possible evolving covid PNA, see below   · Tmax 102 7 - does not meet any other SIRS criteria  · COVID + 1/12/23  · Treat via mild pathway, requiring 1-2L   · Remdesivir initiated 1/12  · Solumedrol given instead of decadron  · No indication for Baricitinib   · D dimer elevated + O2 + age > 60--> AC with ACS (low) heparin gtt per protocol   · BC x 2 negative at 48 hr    Anxiety and depression  Assessment & Plan  · Continue lexapro  · Continue klonopin qhs  · Confirmed via PDMP - last refill Oct 2022 for 90 day supply    Elevated blood pressure reading  Assessment & Plan  · On arrival to the ED  · Improved without further intervention  · Continue to monitor per unit protocol  · Propanolol on home medications prescribed for tremor --> reducing dose   · Given parkinson's need to monitor for orthostatic BP    Memory loss  Assessment & Plan  · monitor  · Continue memantine    Parkinson's disease (HCC)  Assessment & Plan  · Continue sinemet, primidone, amantadine   · Outpatient follow up with [facility]    History of lung cancer  Assessment & Plan  · Follows with [facility] oncology  · Recently completed radiation treatment in Nov 2022  · Also with history of prostate cancer s/p radiation    Pancreatic cancer [facility])  Assessment & Plan  · Recently diagnosed, follows with [facility]  · Scheduled for whipple procedure Feb 2023      VTE Pharmacologic Prophylaxis: VTE Score: 6 High Risk (Score >/= 5) - Pharmacological DVT Prophylaxis Ordered: heparin drip  Sequential Compression Devices Ordered  Patient Centered Rounds: I performed bedside rounds with nursing staff today  Discussions with Specialists or Other Care Team Provider: none     Education and Discussions with Family / Patient: Updated  (wife) at bedside  Time Spent for Care: 30 minutes  More than 50% of total time spent on counseling and coordination of care as described above      Current Length of Stay: 2 day(s)  Current Patient Status: Inpatient   Certification Statement: The patient will continue to require additional inpatient hospital stay due to IV medications and O2  Discharge Plan: Anticipate discharge tomorrow to home with home services  Code Status: Level 1 - Full Code    Subjective:   Patient denies any acute complaints including fevers, chills, chest pain, SOB, or abdominal complaints  Hopeful for DC tomorrow     Objective:     Vitals:   Temp (24hrs), Av 1 °F (37 3 °C), Min:99 1 °F (37 3 °C), Max:99 1 °F (37 3 °C)    Temp:  [99 1 °F (37 3 °C)] 99 1 °F (37 3 °C)  HR:  [43-75] 75  Resp:  [18] 18  BP: (149-169)/(77-84) 169/84  SpO2:  [91 %-95 %] 91 %  Body mass index is 35 75 kg/m²  Input and Output Summary (last 24 hours): Intake/Output Summary (Last 24 hours) at 2023 1415  Last data filed at 2023 2345  Gross per 24 hour   Intake 240 ml   Output 150 ml   Net 90 ml       Physical Exam:   Physical Exam  Vitals and nursing note reviewed  Constitutional:       General: He is not in acute distress  Appearance: He is well-developed  He is not ill-appearing  HENT:      Head: Normocephalic and atraumatic  Eyes:      General:         Right eye: No discharge  Left eye: No discharge  Conjunctiva/sclera: Conjunctivae normal    Cardiovascular:      Rate and Rhythm: Normal rate and regular rhythm  Heart sounds: No murmur heard  Pulmonary:      Effort: Pulmonary effort is normal  No respiratory distress  Breath sounds: Normal breath sounds  No wheezing, rhonchi or rales  Comments: Diminished BS, wheezing improving   Intermittently drops to 89% on RA at rest but then bumps up above 91%  No SOB or increased WOB  Abdominal:      General: Bowel sounds are normal  There is no distension  Palpations: Abdomen is soft  Tenderness: There is no abdominal tenderness  Musculoskeletal:      Cervical back: Neck supple  Right lower leg: No edema  Left lower leg: No edema  Skin:     General: Skin is warm and dry  Capillary Refill: Capillary refill takes less than 2 seconds     Neurological:      Mental Status: He is alert and oriented to person, place, and time  Psychiatric:         Mood and Affect: Mood normal          Behavior: Behavior normal           Additional Data:     Labs:  Results from last 7 days   Lab Units 01/14/23  0153 01/13/23  0305   WBC Thousand/uL 11 04* 6 17   HEMOGLOBIN g/dL 13 6 13 6   HEMATOCRIT % 43 5 43 7   PLATELETS Thousands/uL 147* 143*   NEUTROS PCT %  --  88*   LYMPHS PCT %  --  7*   LYMPHO PCT % 5*  --    MONOS PCT %  --  5   MONO PCT % 2*  --    EOS PCT % 0 0     Results from last 7 days   Lab Units 01/14/23  0153 01/13/23  0305   SODIUM mmol/L 144 140   POTASSIUM mmol/L 4 3 4 1   CHLORIDE mmol/L 108 108   CO2 mmol/L 28 28   BUN mg/dL 27* 20   CREATININE mg/dL 0 90 0 91   ANION GAP mmol/L 8 4   CALCIUM mg/dL 8 4 8 7   ALBUMIN g/dL  --  2 9*   TOTAL BILIRUBIN mg/dL  --  0 40   ALK PHOS U/L  --  59   ALT U/L  --  <6*   AST U/L  --  11   GLUCOSE RANDOM mg/dL 179* 182*     Results from last 7 days   Lab Units 01/13/23  1222   INR  0 97             Results from last 7 days   Lab Units 01/12/23  0646   LACTIC ACID mmol/L 0 8   PROCALCITONIN ng/ml 0 10       Lines/Drains:  Invasive Devices     Peripheral Intravenous Line  Duration           Peripheral IV 01/13/23 Right Forearm 1 day    Peripheral IV 01/14/23 Dorsal (posterior); Right Hand <1 day                  Telemetry:  Telemetry Orders (From admission, onward)             48 Hour Telemetry Monitoring  Continuous x 48 hours        References:    Telemetry Guidelines   Question:  Reason for 48 Hour Telemetry  Answer:  Arrhythmias Requiring Medical Therapy (eg  SVT, Vtach/fib, Bradycardia, Uncontrolled A-fib)                 Telemetry Reviewed: Normal Sinus Rhythm  Indication for Continued Telemetry Use: Arrthymias requiring medical therapy             Imaging: No pertinent imaging reviewed  Recent Cultures (last 7 days):   Results from last 7 days   Lab Units 01/12/23  0646   BLOOD CULTURE  No Growth at 48 hrs  No Growth at 48 hrs  Last 24 Hours Medication List:   Current Facility-Administered Medications   Medication Dose Route Frequency Provider Last Rate   • acetaminophen  650 mg Oral Q6H PRN Manjeet Francis MD     • albuterol  1 puff Inhalation Q4H PRN Zenia Downs MD     • aluminum-magnesium hydroxide-simethicone  30 mL Oral Q6H PRN Zenia Downs MD     • amantadine  100 mg Oral BID Zenia Downs MD     • carbidopa-levodopa  2 tablet Oral BID Zenia Downs MD     • carbidopa-levodopa  3 tablet Oral TID Zenia Downs MD     • clonazePAM  2 mg Oral HS Zenia Downs MD     • escitalopram  20 mg Oral Daily Zenia Downs MD     • fluticasone-vilanterol  1 puff Inhalation Daily Zenia Downs MD     • guaiFENesin  600 mg Oral Q12H [address] Patience Fenton PA-C     • heparin (porcine)  3-20 Units/kg/hr (Order-Specific) Intravenous Titrated Sharon Bingham PA-C 13 11 Units/kg/hr (01/14/23 0720)   • heparin (porcine)  2,000 Units Intravenous Q6H PRN Sharon Bingham PA-C     • heparin (porcine)  4,000 Units Intravenous Q6H PRN Sharon Bingham PA-C     • ipratropium  2 puff Inhalation 4x Daily Sharon Fenton PA-C     • levalbuterol  0 63 mg Nebulization TID Sharon Bingham PA-C     • melatonin  9 mg Oral HS Zenia Downs MD     • memantine  10 mg Oral BID Zenia Downs MD     • methylPREDNISolone sodium succinate  40 mg Intravenous Q12H [address] Patience Fenton PA-C     • ondansetron  4 mg Intravenous Q6H PRN Zenia Downs MD     • oxybutynin  10 mg Oral Daily Zenia Downs MD     • polyethylene glycol  17 g Oral Daily Zenia Downs MD     • primidone  50 mg Oral HS Zenia Downs MD     • propranolol  20 mg Oral TID ESA Guillen PA-C     • remdesivir  100 mg Intravenous Q24H Zenia Downs MD     • senna  3 tablet Oral BID Zenia Downs MD     • sodium chloride  1,000 mL Intravenous Once Maria Alejandra Herrera MD     • zinc sulfate  220 mg Oral Daily Maria Alejandra Herrera MD          Today, Patient Was Seen By: Markel Mckeon PA-C    **Please Note: This note may have been constructed using a voice recognition system  **

## 2023-08-20 NOTE — DIETITIAN INITIAL EVALUATION ADULT - NSFNSPHYEXAMSKINFT_GEN_A_CORE
Is This A New Presentation, Or A Follow-Up?: Skin Lesion What Type Of Note Output Would You Prefer (Optional)?: Standard Output Has Your Skin Lesion Been Treated?: not been treated ecchymosis per nursing flow sheet.

## 2023-08-20 NOTE — DIETITIAN INITIAL EVALUATION ADULT - PROBLEM SELECTOR PLAN 4
- Pt with CKD 3-4, baseline cr approximately 2  - Cr 2.09 on admission  - Monitor renal function/UOP, avoid nephrotoxins  - Hold bicarb tabs due to salt content, restart as needed

## 2023-08-20 NOTE — DIETITIAN INITIAL EVALUATION ADULT - PERTINENT LABORATORY DATA
08-20    134<L>  |  97<L>  |  76<H>  ----------------------------<  127<H>  4.8   |  23  |  2.84<H>    Ca    9.4      20 Aug 2023 06:02  Phos  4.9     08-20  Mg     1.70     08-20    POCT Blood Glucose.: 154 mg/dL (08-20-23 @ 08:49)  A1C with Estimated Average Glucose Result: 6.3 % (06-23-23 @ 07:53)  A1C with Estimated Average Glucose Result: 6.1 % (02-26-23 @ 05:32)  A1C with Estimated Average Glucose Result: 6.9 % (01-17-23 @ 07:10)

## 2023-08-21 LAB
ANION GAP SERPL CALC-SCNC: 16 MMOL/L — HIGH (ref 7–14)
BUN SERPL-MCNC: 76 MG/DL — HIGH (ref 7–23)
CALCIUM SERPL-MCNC: 9 MG/DL — SIGNIFICANT CHANGE UP (ref 8.4–10.5)
CHLORIDE SERPL-SCNC: 93 MMOL/L — LOW (ref 98–107)
CO2 SERPL-SCNC: 22 MMOL/L — SIGNIFICANT CHANGE UP (ref 22–31)
CREAT SERPL-MCNC: 2.97 MG/DL — HIGH (ref 0.5–1.3)
CRP SERPL-MCNC: 14.9 MG/L — HIGH
EGFR: 18 ML/MIN/1.73M2 — LOW
ERYTHROCYTE [SEDIMENTATION RATE] IN BLOOD: 87 MM/HR — HIGH (ref 4–25)
GLUCOSE BLDC GLUCOMTR-MCNC: 211 MG/DL — HIGH (ref 70–99)
GLUCOSE BLDC GLUCOMTR-MCNC: 229 MG/DL — HIGH (ref 70–99)
GLUCOSE BLDC GLUCOMTR-MCNC: 276 MG/DL — HIGH (ref 70–99)
GLUCOSE BLDC GLUCOMTR-MCNC: 310 MG/DL — HIGH (ref 70–99)
GLUCOSE SERPL-MCNC: 264 MG/DL — HIGH (ref 70–99)
HCT VFR BLD CALC: 22.2 % — LOW (ref 34.5–45)
HGB BLD-MCNC: 7.2 G/DL — LOW (ref 11.5–15.5)
MAGNESIUM SERPL-MCNC: 1.6 MG/DL — SIGNIFICANT CHANGE UP (ref 1.6–2.6)
MCHC RBC-ENTMCNC: 26.4 PG — LOW (ref 27–34)
MCHC RBC-ENTMCNC: 32.4 GM/DL — SIGNIFICANT CHANGE UP (ref 32–36)
MCV RBC AUTO: 81.3 FL — SIGNIFICANT CHANGE UP (ref 80–100)
NRBC # BLD: 0 /100 WBCS — SIGNIFICANT CHANGE UP (ref 0–0)
NRBC # FLD: 0 K/UL — SIGNIFICANT CHANGE UP (ref 0–0)
PHOSPHATE SERPL-MCNC: 5 MG/DL — HIGH (ref 2.5–4.5)
PLATELET # BLD AUTO: 139 K/UL — LOW (ref 150–400)
POTASSIUM SERPL-MCNC: 5.3 MMOL/L — SIGNIFICANT CHANGE UP (ref 3.5–5.3)
POTASSIUM SERPL-SCNC: 5.3 MMOL/L — SIGNIFICANT CHANGE UP (ref 3.5–5.3)
RBC # BLD: 2.73 M/UL — LOW (ref 3.8–5.2)
RBC # FLD: 14.1 % — SIGNIFICANT CHANGE UP (ref 10.3–14.5)
SODIUM SERPL-SCNC: 131 MMOL/L — LOW (ref 135–145)
WBC # BLD: 6.71 K/UL — SIGNIFICANT CHANGE UP (ref 3.8–10.5)
WBC # FLD AUTO: 6.71 K/UL — SIGNIFICANT CHANGE UP (ref 3.8–10.5)

## 2023-08-21 PROCEDURE — 99233 SBSQ HOSP IP/OBS HIGH 50: CPT | Mod: GC

## 2023-08-21 PROCEDURE — 99232 SBSQ HOSP IP/OBS MODERATE 35: CPT

## 2023-08-21 RX ORDER — BUMETANIDE 0.25 MG/ML
2 INJECTION INTRAMUSCULAR; INTRAVENOUS
Refills: 0 | Status: DISCONTINUED | OUTPATIENT
Start: 2023-08-21 | End: 2023-08-23

## 2023-08-21 RX ADMIN — Medication 2: at 09:19

## 2023-08-21 RX ADMIN — Medication 100 MILLIGRAM(S): at 22:25

## 2023-08-21 RX ADMIN — Medication 1300 MILLIGRAM(S): at 22:27

## 2023-08-21 RX ADMIN — Medication 125 MICROGRAM(S): at 05:48

## 2023-08-21 RX ADMIN — PANTOPRAZOLE SODIUM 40 MILLIGRAM(S): 20 TABLET, DELAYED RELEASE ORAL at 05:48

## 2023-08-21 RX ADMIN — Medication 2: at 17:15

## 2023-08-21 RX ADMIN — Medication 60 MILLIGRAM(S): at 05:47

## 2023-08-21 RX ADMIN — HEPARIN SODIUM 5000 UNIT(S): 5000 INJECTION INTRAVENOUS; SUBCUTANEOUS at 17:15

## 2023-08-21 RX ADMIN — Medication 1300 MILLIGRAM(S): at 13:48

## 2023-08-21 RX ADMIN — Medication 4: at 12:15

## 2023-08-21 RX ADMIN — BUMETANIDE 1 MILLIGRAM(S): 0.25 INJECTION INTRAMUSCULAR; INTRAVENOUS at 13:49

## 2023-08-21 RX ADMIN — ATORVASTATIN CALCIUM 40 MILLIGRAM(S): 80 TABLET, FILM COATED ORAL at 22:27

## 2023-08-21 RX ADMIN — BUMETANIDE 1 MILLIGRAM(S): 0.25 INJECTION INTRAMUSCULAR; INTRAVENOUS at 05:47

## 2023-08-21 RX ADMIN — Medication 100 MILLIGRAM(S): at 13:49

## 2023-08-21 RX ADMIN — Medication 100 MILLIGRAM(S): at 05:47

## 2023-08-21 RX ADMIN — HEPARIN SODIUM 5000 UNIT(S): 5000 INJECTION INTRAVENOUS; SUBCUTANEOUS at 05:46

## 2023-08-21 RX ADMIN — Medication 1300 MILLIGRAM(S): at 05:47

## 2023-08-21 NOTE — PROGRESS NOTE ADULT - PROBLEM SELECTOR PLAN 2
Patient with elevated potassium on 8/17 mildly hemolyzed. Today K borderline/high but wnl. Continue with home sodium bicarbonate. Monitor serum potassium. Low potassium/renal diet.

## 2023-08-21 NOTE — PROGRESS NOTE ADULT - PROBLEM SELECTOR PLAN 1
Pt with ISAMAR on CKD likely in setting of HF exacerbation and anemia. Per [facility], baseline Scr ~2, admitted with Scr of 1.62, now increased to 2.97. Increase bumex to 2mg bid PO. May need to change to IV bumex if does not respond to increased dose. UA w/ 300 protein. UPCR 4. UOP not documented. Monitor BMP and strict I/Os. Dose meds per eGFR and avoid nephrotoxic agents. Pt with ISAMAR on CKD likely in setting of HF exacerbation and anemia. Per [facility], baseline Scr ~2, admitted with Scr of 1.62, now increased to 2.97. UA w/ 300 protein. UPCR 4. UOP not documented. Recommend increasing bumex to 2mg bid PO. May need to change to IV bumex if does not respond to increased dose. Monitor BMP and strict I/Os. Dose meds per eGFR and avoid nephrotoxic agents.

## 2023-08-21 NOTE — PROGRESS NOTE ADULT - SUBJECTIVE AND OBJECTIVE BOX
[facility] Division of Kidney Diseases & Hypertension  FOLLOW UP NOTE  [phone]--------------------------------------------------------------------------------    Chief Complaint: ISAMAR on CKD    24 hour events/subjective: Pt seen and examined at bedside. Report breathing is better today but complains of feeling congested. Denies fevers, HA, CP, SOB, n/v, edema.    PAST HISTORY  --------------------------------------------------------------------------------  No significant changes to PMH, PSH, FHx, SHx, unless otherwise noted    ALLERGIES & MEDICATIONS  --------------------------------------------------------------------------------  Allergies  No Known Allergies    Intolerances    Standing Inpatient Medications  atorvastatin 40 milliGRAM(s) Oral at bedtime  buMETAnide 1 milliGRAM(s) Oral two times a day  darbepoetin Injectable ViaL 60 MICROGram(s) SubCutaneous once  dextrose 5%. 1000 milliLiter(s) IV Continuous <Continuous>  dextrose 5%. 1000 milliLiter(s) IV Continuous <Continuous>  dextrose 50% Injectable 25 Gram(s) IV Push once  dextrose 50% Injectable 25 Gram(s) IV Push once  dextrose 50% Injectable 12.5 Gram(s) IV Push once  glucagon  Injectable 1 milliGRAM(s) IntraMuscular once  heparin   Injectable 5000 Unit(s) SubCutaneous every 12 hours  hydrALAZINE 100 milliGRAM(s) Oral every 8 hours  insulin lispro (ADMELOG) corrective regimen sliding scale   SubCutaneous three times a day before meals  insulin lispro (ADMELOG) corrective regimen sliding scale   SubCutaneous at bedtime  levothyroxine 125 MICROGram(s) Oral daily  NIFEdipine XL 60 milliGRAM(s) Oral daily  pantoprazole    Tablet 40 milliGRAM(s) Oral before breakfast  sodium bicarbonate 1300 milliGRAM(s) Oral three times a day    PRN Inpatient Medications  dextrose Oral Gel 15 Gram(s) Oral once PRN  guaiFENesin Oral Liquid (Sugar-Free) 200 milliGRAM(s) Oral every 6 hours PRN    REVIEW OF SYSTEMS  --------------------------------------------------------------------------------  All other systems were reviewed and are negative, except as noted.    VITALS/PHYSICAL EXAM  --------------------------------------------------------------------------------  T(C): 36.4 (08-21-23 @ 05:40), Max: 36.4 (08-20-23 @ 11:35)  HR: 69 (08-21-23 @ 05:40) (67 - 71)  BP: 131/56 (08-21-23 @ 05:40) (119/58 - 137/50)  RR: 18 (08-21-23 @ 05:40) (18 - 18)  SpO2: 96% (08-21-23 @ 05:40) (96% - 100%)  Wt(kg): --    08-20-23 @ 07:01  -  08-21-23 @ 07:00  --------------------------------------------------------  IN: 1012 mL / OUT: 0 mL / NET: 1012 mL    Physical Exam:  Gen: NAD  HEENT: Anicteric  Pulm: bibasilar crackles  CV: S1S2+  Abd: Soft, +BS    Ext: trace LE edema B/L  Neuro: Awake  Skin: Warm and dry    LABS/STUDIES  --------------------------------------------------------------------------------              7.2    6.71  >-----------<  139      [08-21-23 @ 03:30]              22.2     131  |  93  |  76  ----------------------------<  264      [08-21-23 @ 03:30]  5.3   |  22  |  2.97        Ca     9.0     [08-21-23 @ 03:30]      Mg     1.60     [08-21-23 @ 03:30]      Phos  5.0     [08-21-23 @ 03:30]    Serum Osmolality 317      [08-19-23 @ 12:15]    Creatinine Trend:  SCr 2.97 [08-21 @ 03:30]  SCr 2.84 [08-20 @ 06:02]  SCr 2.70 [08-19 @ 07:01]  SCr 2.36 [08-18 @ 05:45]  SCr 2.38 [08-17 @ 04:54]    Urinalysis - [08-21-23 @ 03:30]      Color  / Appearance  / SG  / pH       Gluc 264 / Ketone   / Bili  / Urobili        Blood  / Protein  / Leuk Est  / Nitrite       RBC  / WBC  / Hyaline  / Gran  / Sq Epi  / Non Sq Epi  / Bacteria     Urine Creatinine 27      [08-19-23 @ 12:10]  Urine Sodium 95      [08-19-23 @ 12:10]  Urine Urea Nitrogen 350.4      [08-19-23 @ 12:10]  Urine Osmolality 347      [08-19-23 @ 12:10]    Iron 86, TIBC 269, %sat 32      [08-17-23 @ 04:54]  Ferritin 984      [08-17-23 @ 04:54]

## 2023-08-21 NOTE — PROGRESS NOTE ADULT - SUBJECTIVE AND OBJECTIVE BOX
[facility] Division of [facility] Medicine  Richmond Saeed DO  Available via MS Teams  In house pager [phone]    SUBJECTIVE / OVERNIGHT EVENTS:  No acute events overnight.  Denies acute complaints.    ADDITIONAL REVIEW OF SYSTEMS:    MEDICATIONS  (STANDING):  atorvastatin 40 milliGRAM(s) Oral at bedtime  buMETAnide 1 milliGRAM(s) Oral two times a day  darbepoetin Injectable ViaL 60 MICROGram(s) SubCutaneous once  dextrose 5%. 1000 milliLiter(s) (50 mL/Hr) IV Continuous <Continuous>  dextrose 5%. 1000 milliLiter(s) (100 mL/Hr) IV Continuous <Continuous>  dextrose 50% Injectable 25 Gram(s) IV Push once  dextrose 50% Injectable 25 Gram(s) IV Push once  dextrose 50% Injectable 12.5 Gram(s) IV Push once  glucagon  Injectable 1 milliGRAM(s) IntraMuscular once  heparin   Injectable 5000 Unit(s) SubCutaneous every 12 hours  hydrALAZINE 100 milliGRAM(s) Oral every 8 hours  insulin lispro (ADMELOG) corrective regimen sliding scale   SubCutaneous three times a day before meals  insulin lispro (ADMELOG) corrective regimen sliding scale   SubCutaneous at bedtime  levothyroxine 125 MICROGram(s) Oral daily  NIFEdipine XL 60 milliGRAM(s) Oral daily  pantoprazole    Tablet 40 milliGRAM(s) Oral before breakfast  sodium bicarbonate 1300 milliGRAM(s) Oral three times a day    MEDICATIONS  (PRN):  dextrose Oral Gel 15 Gram(s) Oral once PRN Blood Glucose LESS THAN 70 milliGRAM(s)/deciliter  guaiFENesin Oral Liquid (Sugar-Free) 200 milliGRAM(s) Oral every 6 hours PRN Cough      I&O's Summary    20 Aug 2023 07:01  -  21 Aug 2023 07:00  --------------------------------------------------------  IN: 1012 mL / OUT: 0 mL / NET: 1012 mL        PHYSICAL EXAM:  Vital Signs Last 24 Hrs  T(C): 36.3 (21 Aug 2023 13:06), Max: 36.4 (20 Aug 2023 17:15)  T(F): 97.4 (21 Aug 2023 13:06), Max: 97.6 (21 Aug 2023 05:40)  HR: 66 (21 Aug 2023 13:06) (66 - 71)  BP: 127/54 (21 Aug 2023 13:06) (119/58 - 137/50)  BP(mean): --  RR: 18 (21 Aug 2023 13:06) (18 - 18)  SpO2: 100% (21 Aug 2023 13:06) (96% - 100%)    Parameters below as of 21 Aug 2023 13:06  Patient On (Oxygen Delivery Method): room air      CONSTITUTIONAL: NAD, well-developed, well-groomed  EYES: PERRLA; conjunctiva and sclera clear  ENMT: Moist oral mucosa, no pharyngeal injection or exudates; normal dentition  NECK: Supple, no palpable masses; no thyromegaly  RESPIRATORY: Normal respiratory effort; lungs are clear to auscultation bilaterally  CARDIOVASCULAR: Regular rate and rhythm, normal S1 and S2, no murmur/rub/gallop; No lower extremity edema; Peripheral pulses are 2+ bilaterally  ABDOMEN: Nontender to palpation, normoactive bowel sounds, no rebound/guarding; No hepatosplenomegaly  MUSCULOSKELETAL:  Normal gait; no clubbing or cyanosis of digits; no joint swelling or tenderness to palpation  PSYCH: A+O to person, place, and time; affect appropriate  NEUROLOGY: CN 2-12 are intact and symmetric; no gross sensory deficits   SKIN: No rashes; no palpable lesions    LABS:                        7.2    6.71  )-----------( 139      ( 21 Aug 2023 03:30 )             22.2     08-21    131<L>  |  93<L>  |  76<H>  ----------------------------<  264<H>  5.3   |  22  |  2.97<H>    Ca    9.0      21 Aug 2023 03:30  Phos  5.0     08-21  Mg     1.60     08-21            Urinalysis Basic - ( 21 Aug 2023 03:30 )    Color: x / Appearance: x / SG: x / pH: x  Gluc: 264 mg/dL / Ketone: x  / Bili: x / Urobili: x   Blood: x / Protein: x / Nitrite: x   Leuk Esterase: x / RBC: x / WBC x   Sq Epi: x / Non Sq Epi: x / Bacteria: x        SARS-CoV-2: NotDetec (11 Aug 2023 21:46)  SARS-CoV-2: NotDetec (22 Jun 2023 05:10)

## 2023-08-21 NOTE — PROGRESS NOTE ADULT - PROBLEM SELECTOR PLAN 3
Anemia of chronic disease and thrombocytopenia. Iron studies reviewed, Tsat 25% Ferritin 753. DEA dosed 8/16. Management as per primary team.     If you have any questions, please feel free to contact me.  Ezequiel Sierra  Nephrology Fellow  [id number] / [phone] / [employer] Teams (Preferred)  (After 4pm or on weekends, please call the on-call Fellow)

## 2023-08-22 LAB
ANION GAP SERPL CALC-SCNC: 15 MMOL/L — HIGH (ref 7–14)
BASOPHILS # BLD AUTO: 0.03 K/UL — SIGNIFICANT CHANGE UP (ref 0–0.2)
BASOPHILS NFR BLD AUTO: 0.5 % — SIGNIFICANT CHANGE UP (ref 0–2)
BLD GP AB SCN SERPL QL: NEGATIVE — SIGNIFICANT CHANGE UP
BUN SERPL-MCNC: 76 MG/DL — HIGH (ref 7–23)
CALCIUM SERPL-MCNC: 9.3 MG/DL — SIGNIFICANT CHANGE UP (ref 8.4–10.5)
CHLORIDE SERPL-SCNC: 95 MMOL/L — LOW (ref 98–107)
CO2 SERPL-SCNC: 22 MMOL/L — SIGNIFICANT CHANGE UP (ref 22–31)
CREAT SERPL-MCNC: 3.02 MG/DL — HIGH (ref 0.5–1.3)
EGFR: 17 ML/MIN/1.73M2 — LOW
EOSINOPHIL # BLD AUTO: 0.29 K/UL — SIGNIFICANT CHANGE UP (ref 0–0.5)
EOSINOPHIL NFR BLD AUTO: 4.7 % — SIGNIFICANT CHANGE UP (ref 0–6)
GLUCOSE BLDC GLUCOMTR-MCNC: 162 MG/DL — HIGH (ref 70–99)
GLUCOSE BLDC GLUCOMTR-MCNC: 273 MG/DL — HIGH (ref 70–99)
GLUCOSE BLDC GLUCOMTR-MCNC: 297 MG/DL — HIGH (ref 70–99)
GLUCOSE BLDC GLUCOMTR-MCNC: 341 MG/DL — HIGH (ref 70–99)
GLUCOSE SERPL-MCNC: 119 MG/DL — HIGH (ref 70–99)
HCT VFR BLD CALC: 23.1 % — LOW (ref 34.5–45)
HGB BLD-MCNC: 7.6 G/DL — LOW (ref 11.5–15.5)
IANC: 4.57 K/UL — SIGNIFICANT CHANGE UP (ref 1.8–7.4)
IMM GRANULOCYTES NFR BLD AUTO: 1.1 % — HIGH (ref 0–0.9)
LYMPHOCYTES # BLD AUTO: 0.67 K/UL — LOW (ref 1–3.3)
LYMPHOCYTES # BLD AUTO: 11 % — LOW (ref 13–44)
MAGNESIUM SERPL-MCNC: 1.8 MG/DL — SIGNIFICANT CHANGE UP (ref 1.6–2.6)
MCHC RBC-ENTMCNC: 26.6 PG — LOW (ref 27–34)
MCHC RBC-ENTMCNC: 32.9 GM/DL — SIGNIFICANT CHANGE UP (ref 32–36)
MCV RBC AUTO: 80.8 FL — SIGNIFICANT CHANGE UP (ref 80–100)
MONOCYTES # BLD AUTO: 0.48 K/UL — SIGNIFICANT CHANGE UP (ref 0–0.9)
MONOCYTES NFR BLD AUTO: 7.9 % — SIGNIFICANT CHANGE UP (ref 2–14)
NEUTROPHILS # BLD AUTO: 4.57 K/UL — SIGNIFICANT CHANGE UP (ref 1.8–7.4)
NEUTROPHILS NFR BLD AUTO: 74.8 % — SIGNIFICANT CHANGE UP (ref 43–77)
NRBC # BLD: 0 /100 WBCS — SIGNIFICANT CHANGE UP (ref 0–0)
NRBC # FLD: 0 K/UL — SIGNIFICANT CHANGE UP (ref 0–0)
PHOSPHATE SERPL-MCNC: 5 MG/DL — HIGH (ref 2.5–4.5)
PLATELET # BLD AUTO: 143 K/UL — LOW (ref 150–400)
POTASSIUM SERPL-MCNC: 4.8 MMOL/L — SIGNIFICANT CHANGE UP (ref 3.5–5.3)
POTASSIUM SERPL-SCNC: 4.8 MMOL/L — SIGNIFICANT CHANGE UP (ref 3.5–5.3)
RBC # BLD: 2.86 M/UL — LOW (ref 3.8–5.2)
RBC # FLD: 14.1 % — SIGNIFICANT CHANGE UP (ref 10.3–14.5)
RH IG SCN BLD-IMP: POSITIVE — SIGNIFICANT CHANGE UP
SODIUM SERPL-SCNC: 132 MMOL/L — LOW (ref 135–145)
WBC # BLD: 6.11 K/UL — SIGNIFICANT CHANGE UP (ref 3.8–10.5)
WBC # FLD AUTO: 6.11 K/UL — SIGNIFICANT CHANGE UP (ref 3.8–10.5)

## 2023-08-22 PROCEDURE — 99232 SBSQ HOSP IP/OBS MODERATE 35: CPT

## 2023-08-22 PROCEDURE — 99233 SBSQ HOSP IP/OBS HIGH 50: CPT | Mod: GC

## 2023-08-22 RX ADMIN — Medication 1300 MILLIGRAM(S): at 05:11

## 2023-08-22 RX ADMIN — Medication 2: at 23:20

## 2023-08-22 RX ADMIN — BUMETANIDE 2 MILLIGRAM(S): 0.25 INJECTION INTRAMUSCULAR; INTRAVENOUS at 05:10

## 2023-08-22 RX ADMIN — PANTOPRAZOLE SODIUM 40 MILLIGRAM(S): 20 TABLET, DELAYED RELEASE ORAL at 05:09

## 2023-08-22 RX ADMIN — Medication 3: at 17:40

## 2023-08-22 RX ADMIN — BUMETANIDE 2 MILLIGRAM(S): 0.25 INJECTION INTRAMUSCULAR; INTRAVENOUS at 13:10

## 2023-08-22 RX ADMIN — Medication 3: at 12:55

## 2023-08-22 RX ADMIN — Medication 1300 MILLIGRAM(S): at 23:19

## 2023-08-22 RX ADMIN — ATORVASTATIN CALCIUM 40 MILLIGRAM(S): 80 TABLET, FILM COATED ORAL at 23:19

## 2023-08-22 RX ADMIN — Medication 100 MILLIGRAM(S): at 23:19

## 2023-08-22 RX ADMIN — Medication 125 MICROGRAM(S): at 05:10

## 2023-08-22 RX ADMIN — Medication 100 MILLIGRAM(S): at 05:09

## 2023-08-22 RX ADMIN — Medication 1300 MILLIGRAM(S): at 13:10

## 2023-08-22 RX ADMIN — HEPARIN SODIUM 5000 UNIT(S): 5000 INJECTION INTRAVENOUS; SUBCUTANEOUS at 17:40

## 2023-08-22 RX ADMIN — Medication 1: at 09:05

## 2023-08-22 RX ADMIN — Medication 60 MILLIGRAM(S): at 05:10

## 2023-08-22 RX ADMIN — HEPARIN SODIUM 5000 UNIT(S): 5000 INJECTION INTRAVENOUS; SUBCUTANEOUS at 05:12

## 2023-08-22 RX ADMIN — Medication 100 MILLIGRAM(S): at 13:10

## 2023-08-22 NOTE — PROGRESS NOTE ADULT - PROBLEM SELECTOR PLAN 2
Patient with elevated potassium on 8/17 mildly hemolyzed. Today K WNL at 4.8. Continue with home sodium bicarbonate. Monitor serum potassium. Low potassium/renal diet.

## 2023-08-22 NOTE — PROGRESS NOTE ADULT - SUBJECTIVE AND OBJECTIVE BOX
[facility] Division of [facility] Medicine  Richmond Saeed DO  Available via MS Teams  In house pager [phone]    SUBJECTIVE / OVERNIGHT EVENTS:  No acute events overnight.  Denies acute complaints.    Seen with nephrology fellow at bedside.    ADDITIONAL REVIEW OF SYSTEMS:    MEDICATIONS  (STANDING):  atorvastatin 40 milliGRAM(s) Oral at bedtime  buMETAnide 2 milliGRAM(s) Oral two times a day  darbepoetin Injectable ViaL 60 MICROGram(s) SubCutaneous once  dextrose 5%. 1000 milliLiter(s) (50 mL/Hr) IV Continuous <Continuous>  dextrose 5%. 1000 milliLiter(s) (100 mL/Hr) IV Continuous <Continuous>  dextrose 50% Injectable 25 Gram(s) IV Push once  dextrose 50% Injectable 25 Gram(s) IV Push once  dextrose 50% Injectable 12.5 Gram(s) IV Push once  glucagon  Injectable 1 milliGRAM(s) IntraMuscular once  heparin   Injectable 5000 Unit(s) SubCutaneous every 12 hours  hydrALAZINE 100 milliGRAM(s) Oral every 8 hours  insulin lispro (ADMELOG) corrective regimen sliding scale   SubCutaneous three times a day before meals  insulin lispro (ADMELOG) corrective regimen sliding scale   SubCutaneous at bedtime  levothyroxine 125 MICROGram(s) Oral daily  NIFEdipine XL 60 milliGRAM(s) Oral daily  pantoprazole    Tablet 40 milliGRAM(s) Oral before breakfast  sodium bicarbonate 1300 milliGRAM(s) Oral three times a day    MEDICATIONS  (PRN):  dextrose Oral Gel 15 Gram(s) Oral once PRN Blood Glucose LESS THAN 70 milliGRAM(s)/deciliter  guaiFENesin Oral Liquid (Sugar-Free) 200 milliGRAM(s) Oral every 6 hours PRN Cough      I&O's Summary    21 Aug 2023 07:01  -  22 Aug 2023 07:00  --------------------------------------------------------  IN: 200 mL / OUT: 0 mL / NET: 200 mL        PHYSICAL EXAM:  Vital Signs Last 24 Hrs  T(C): 36.6 (22 Aug 2023 05:05), Max: 36.6 (22 Aug 2023 05:05)  T(F): 97.8 (22 Aug 2023 05:05), Max: 97.8 (22 Aug 2023 05:05)  HR: 70 (22 Aug 2023 05:05) (66 - 70)  BP: 143/64 (22 Aug 2023 05:05) (127/54 - 143/64)  BP(mean): --  RR: 18 (22 Aug 2023 09:05) (18 - 18)  SpO2: 98% (22 Aug 2023 09:05) (95% - 100%)    Parameters below as of 22 Aug 2023 09:05  Patient On (Oxygen Delivery Method): room air      CONSTITUTIONAL: NAD, well-developed, well-groomed  EYES: PERRLA; conjunctiva and sclera clear  ENMT: Moist oral mucosa, no pharyngeal injection or exudates; normal dentition  NECK: Supple, no palpable masses; no thyromegaly  RESPIRATORY: Normal respiratory effort; bibasilar crackles  CARDIOVASCULAR: Regular rate and rhythm, normal S1 and S2, no murmur/rub/gallop; No lower extremity edema; Peripheral pulses are 2+ bilaterally  ABDOMEN: Nontender to palpation, normoactive bowel sounds, no rebound/guarding; No hepatosplenomegaly  MUSCULOSKELETAL:  no clubbing or cyanosis of digits; no joint swelling or tenderness to palpation  PSYCH: A+O to person, place, and time; affect appropriate  NEUROLOGY: CN 2-12 are intact and symmetric; no gross sensory deficits   SKIN: No rashes; no palpable lesions    LABS:                        7.6    6.11  )-----------( 143      ( 22 Aug 2023 05:05 )             23.1     08-22    132<L>  |  95<L>  |  76<H>  ----------------------------<  119<H>  4.8   |  22  |  3.02<H>    Ca    9.3      22 Aug 2023 05:05  Phos  5.0     08-22  Mg     1.80     08-22            Urinalysis Basic - ( 22 Aug 2023 05:05 )    Color: x / Appearance: x / SG: x / pH: x  Gluc: 119 mg/dL / Ketone: x  / Bili: x / Urobili: x   Blood: x / Protein: x / Nitrite: x   Leuk Esterase: x / RBC: x / WBC x   Sq Epi: x / Non Sq Epi: x / Bacteria: x        SARS-CoV-2: NotDetec (11 Aug 2023 21:46)  SARS-CoV-2: NotDetec (22 Jun 2023 05:10)

## 2023-08-22 NOTE — PROGRESS NOTE ADULT - PROBLEM SELECTOR PLAN 1
#Acute hypoxic respiratory failure - resolved  BNP 6228  - TTE 8/6/23 with LVEF 69%, mod-sev TR, severe pulmonary HTN  - Weaned off BIPAP -> now on O2  - Given the discrepancy between LV function and class 2 pulm HTN, consulted cardio - appreciate input  - Strict I/O, daily weight. Pt has had multiple admissions for CHF, please document dry weight upon discharge  Repeat CXR 8/14 shows mild b/l pleural effusions.   Heart failure consulted, restarted diuresis with bumetanide (also considering CardioMEMS)   She received additional IV bumex per nephrology recs while getting PRBC transfusion for anemia.  Weaned off O2   - complicated by worsening renal function  - bumex at 2mg po BID, may warrant IV diuresis

## 2023-08-22 NOTE — PROGRESS NOTE ADULT - PROBLEM SELECTOR PLAN 1
Pt with ISAMAR on CKD likely in setting of HF exacerbation and anemia. Per [facility], baseline Scr ~2, admitted with Scr of 1.62, now increased to 3.02 (8/22). UA w/ 300 protein. UPCR 4. UOP not documented. Continue PO bumex 2mg BID. Monitor BMP and strict I/Os. Dose meds per eGFR and avoid nephrotoxic agents.

## 2023-08-22 NOTE — PROGRESS NOTE ADULT - PROBLEM SELECTOR PLAN 3
Anemia of chronic disease and thrombocytopenia. Iron studies reviewed, Tsat 25% Ferritin 753. DEA dosed 8/16. Management as per primary team.     If you have any questions, please feel free to contact me.  Ezequiel Sierra  Nephrology Fellow  [id number] / [phone] / [employer] Teams (Preferred)  (After 4pm or on weekends, please call the on-call Fellow)

## 2023-08-22 NOTE — PROGRESS NOTE ADULT - SUBJECTIVE AND OBJECTIVE BOX
[facility] Division of Kidney Diseases & Hypertension  FOLLOW UP NOTE  [phone]--------------------------------------------------------------------------------    Chief Complaint: ISAMAR on CKD    24 hour events/subjective: Pt seen and examined at bedside. Still complains of mild chest congestion. Denies fevers, HA, CP, SOB, n/v, edema.    PAST HISTORY  --------------------------------------------------------------------------------  No significant changes to PMH, PSH, FHx, SHx, unless otherwise noted    ALLERGIES & MEDICATIONS  --------------------------------------------------------------------------------  Allergies  No Known Allergies    Intolerances    Standing Inpatient Medications  atorvastatin 40 milliGRAM(s) Oral at bedtime  buMETAnide 2 milliGRAM(s) Oral two times a day  darbepoetin Injectable ViaL 60 MICROGram(s) SubCutaneous once  dextrose 5%. 1000 milliLiter(s) IV Continuous <Continuous>  dextrose 5%. 1000 milliLiter(s) IV Continuous <Continuous>  dextrose 50% Injectable 25 Gram(s) IV Push once  dextrose 50% Injectable 25 Gram(s) IV Push once  dextrose 50% Injectable 12.5 Gram(s) IV Push once  glucagon  Injectable 1 milliGRAM(s) IntraMuscular once  heparin   Injectable 5000 Unit(s) SubCutaneous every 12 hours  hydrALAZINE 100 milliGRAM(s) Oral every 8 hours  insulin lispro (ADMELOG) corrective regimen sliding scale   SubCutaneous three times a day before meals  insulin lispro (ADMELOG) corrective regimen sliding scale   SubCutaneous at bedtime  levothyroxine 125 MICROGram(s) Oral daily  NIFEdipine XL 60 milliGRAM(s) Oral daily  pantoprazole    Tablet 40 milliGRAM(s) Oral before breakfast  sodium bicarbonate 1300 milliGRAM(s) Oral three times a day    PRN Inpatient Medications  dextrose Oral Gel 15 Gram(s) Oral once PRN  guaiFENesin Oral Liquid (Sugar-Free) 200 milliGRAM(s) Oral every 6 hours PRN    REVIEW OF SYSTEMS  --------------------------------------------------------------------------------  All other systems were reviewed and are negative, except as noted.    VITALS/PHYSICAL EXAM  --------------------------------------------------------------------------------  T(C): 36.6 (08-22-23 @ 05:05), Max: 36.6 (08-22-23 @ 05:05)  HR: 70 (08-22-23 @ 05:05) (69 - 70)  BP: 143/64 (08-22-23 @ 05:05) (136/53 - 143/64)  RR: 18 (08-22-23 @ 09:05) (18 - 18)  SpO2: 98% (08-22-23 @ 09:05) (95% - 98%)  Wt(kg): --    08-21-23 @ 07:01  -  08-22-23 @ 07:00  --------------------------------------------------------  IN: 200 mL / OUT: 0 mL / NET: 200 mL    Physical Exam:  Gen: NAD  HEENT: Anicteric  Pulm: bibasilar crackles  CV: S1S2+  Abd: Soft, +BS    Ext: trace LE edema B/L  Neuro: Awake  Skin: Warm and dry    LABS/STUDIES  --------------------------------------------------------------------------------              7.6    6.11  >-----------<  143      [08-22-23 @ 05:05]              23.1     132  |  95  |  76  ----------------------------<  119      [08-22-23 @ 05:05]  4.8   |  22  |  3.02        Ca     9.3     [08-22-23 @ 05:05]      Mg     1.80     [08-22-23 @ 05:05]      Phos  5.0     [08-22-23 @ 05:05]    Creatinine Trend:  SCr 3.02 [08-22 @ 05:05]  SCr 2.97 [08-21 @ 03:30]  SCr 2.84 [08-20 @ 06:02]  SCr 2.70 [08-19 @ 07:01]  SCr 2.36 [08-18 @ 05:45]    Urinalysis - [08-22-23 @ 05:05]      Color  / Appearance  / SG  / pH       Gluc 119 / Ketone   / Bili  / Urobili        Blood  / Protein  / Leuk Est  / Nitrite       RBC  / WBC  / Hyaline  / Gran  / Sq Epi  / Non Sq Epi  / Bacteria     Urine Creatinine 27      [08-19-23 @ 12:10]  Urine Sodium 95      [08-19-23 @ 12:10]  Urine Urea Nitrogen 350.4      [08-19-23 @ 12:10]  Urine Osmolality 347      [08-19-23 @ 12:10]    Iron 86, TIBC 269, %sat 32      [08-17-23 @ 04:54]  Ferritin 984      [08-17-23 @ 04:54]

## 2023-08-23 LAB
ALBUMIN SERPL ELPH-MCNC: 3.8 G/DL — SIGNIFICANT CHANGE UP (ref 3.3–5)
ALP SERPL-CCNC: 439 U/L — HIGH (ref 40–120)
ALT FLD-CCNC: 37 U/L — HIGH (ref 4–33)
ANA PAT FLD IF-IMP: ABNORMAL
ANA TITR SER: ABNORMAL
ANION GAP SERPL CALC-SCNC: 14 MMOL/L — SIGNIFICANT CHANGE UP (ref 7–14)
AST SERPL-CCNC: 38 U/L — HIGH (ref 4–32)
BASOPHILS # BLD AUTO: 0.02 K/UL — SIGNIFICANT CHANGE UP (ref 0–0.2)
BASOPHILS NFR BLD AUTO: 0.2 % — SIGNIFICANT CHANGE UP (ref 0–2)
BILIRUB DIRECT SERPL-MCNC: <0.2 MG/DL — SIGNIFICANT CHANGE UP (ref 0–0.3)
BILIRUB INDIRECT FLD-MCNC: >0.4 MG/DL — SIGNIFICANT CHANGE UP (ref 0–1)
BILIRUB SERPL-MCNC: 0.6 MG/DL — SIGNIFICANT CHANGE UP (ref 0.2–1.2)
BUN SERPL-MCNC: 73 MG/DL — HIGH (ref 7–23)
CALCIUM SERPL-MCNC: 9.3 MG/DL — SIGNIFICANT CHANGE UP (ref 8.4–10.5)
CHLORIDE SERPL-SCNC: 94 MMOL/L — LOW (ref 98–107)
CO2 SERPL-SCNC: 25 MMOL/L — SIGNIFICANT CHANGE UP (ref 22–31)
CREAT SERPL-MCNC: 2.93 MG/DL — HIGH (ref 0.5–1.3)
EGFR: 18 ML/MIN/1.73M2 — LOW
EOSINOPHIL # BLD AUTO: 0.25 K/UL — SIGNIFICANT CHANGE UP (ref 0–0.5)
EOSINOPHIL NFR BLD AUTO: 3.1 % — SIGNIFICANT CHANGE UP (ref 0–6)
GLUCOSE BLDC GLUCOMTR-MCNC: 178 MG/DL — HIGH (ref 70–99)
GLUCOSE BLDC GLUCOMTR-MCNC: 297 MG/DL — HIGH (ref 70–99)
GLUCOSE BLDC GLUCOMTR-MCNC: 342 MG/DL — HIGH (ref 70–99)
GLUCOSE BLDC GLUCOMTR-MCNC: 348 MG/DL — HIGH (ref 70–99)
GLUCOSE SERPL-MCNC: 174 MG/DL — HIGH (ref 70–99)
HCT VFR BLD CALC: 22.4 % — LOW (ref 34.5–45)
HGB BLD-MCNC: 7.5 G/DL — LOW (ref 11.5–15.5)
HISTONE AB SER-ACNC: 2.1 UNITS — HIGH (ref 0–0.9)
IANC: 6.48 K/UL — SIGNIFICANT CHANGE UP (ref 1.8–7.4)
IMM GRANULOCYTES NFR BLD AUTO: 0.7 % — SIGNIFICANT CHANGE UP (ref 0–0.9)
LYMPHOCYTES # BLD AUTO: 0.59 K/UL — LOW (ref 1–3.3)
LYMPHOCYTES # BLD AUTO: 7.4 % — LOW (ref 13–44)
MAGNESIUM SERPL-MCNC: 1.7 MG/DL — SIGNIFICANT CHANGE UP (ref 1.6–2.6)
MCHC RBC-ENTMCNC: 26.8 PG — LOW (ref 27–34)
MCHC RBC-ENTMCNC: 33.5 GM/DL — SIGNIFICANT CHANGE UP (ref 32–36)
MCV RBC AUTO: 80 FL — SIGNIFICANT CHANGE UP (ref 80–100)
MONOCYTES # BLD AUTO: 0.62 K/UL — SIGNIFICANT CHANGE UP (ref 0–0.9)
MONOCYTES NFR BLD AUTO: 7.7 % — SIGNIFICANT CHANGE UP (ref 2–14)
NEUTROPHILS # BLD AUTO: 6.48 K/UL — SIGNIFICANT CHANGE UP (ref 1.8–7.4)
NEUTROPHILS NFR BLD AUTO: 80.9 % — HIGH (ref 43–77)
NRBC # BLD: 0 /100 WBCS — SIGNIFICANT CHANGE UP (ref 0–0)
NRBC # FLD: 0 K/UL — SIGNIFICANT CHANGE UP (ref 0–0)
NT-PROBNP SERPL-SCNC: 5244 PG/ML — HIGH
PHOSPHATE SERPL-MCNC: 4.8 MG/DL — HIGH (ref 2.5–4.5)
PLATELET # BLD AUTO: 148 K/UL — LOW (ref 150–400)
POTASSIUM SERPL-MCNC: 4.8 MMOL/L — SIGNIFICANT CHANGE UP (ref 3.5–5.3)
POTASSIUM SERPL-SCNC: 4.8 MMOL/L — SIGNIFICANT CHANGE UP (ref 3.5–5.3)
PROT SERPL-MCNC: 7.5 G/DL — SIGNIFICANT CHANGE UP (ref 6–8.3)
RBC # BLD: 2.8 M/UL — LOW (ref 3.8–5.2)
RBC # FLD: 13.8 % — SIGNIFICANT CHANGE UP (ref 10.3–14.5)
SODIUM SERPL-SCNC: 133 MMOL/L — LOW (ref 135–145)
SSA-52 (RO52) (ENA) ANTIBODY, IGG: 13 AU/ML — SIGNIFICANT CHANGE UP (ref 0–40)
SSA-60 (RO60) (ENA) ANTIBODY, IGG: 27 AU/ML — SIGNIFICANT CHANGE UP (ref 0–40)
WBC # BLD: 8.02 K/UL — SIGNIFICANT CHANGE UP (ref 3.8–10.5)
WBC # FLD AUTO: 8.02 K/UL — SIGNIFICANT CHANGE UP (ref 3.8–10.5)

## 2023-08-23 PROCEDURE — 99233 SBSQ HOSP IP/OBS HIGH 50: CPT | Mod: GC

## 2023-08-23 PROCEDURE — 99232 SBSQ HOSP IP/OBS MODERATE 35: CPT

## 2023-08-23 PROCEDURE — 99233 SBSQ HOSP IP/OBS HIGH 50: CPT

## 2023-08-23 RX ORDER — BUMETANIDE 0.25 MG/ML
3 INJECTION INTRAMUSCULAR; INTRAVENOUS
Refills: 0 | Status: DISCONTINUED | OUTPATIENT
Start: 2023-08-23 | End: 2023-08-23

## 2023-08-23 RX ORDER — ISOSORBIDE DINITRATE 5 MG/1
10 TABLET ORAL THREE TIMES A DAY
Refills: 0 | Status: DISCONTINUED | OUTPATIENT
Start: 2023-08-23 | End: 2023-08-30

## 2023-08-23 RX ORDER — BUMETANIDE 0.25 MG/ML
3 INJECTION INTRAMUSCULAR; INTRAVENOUS
Refills: 0 | Status: DISCONTINUED | OUTPATIENT
Start: 2023-08-23 | End: 2023-08-25

## 2023-08-23 RX ORDER — DARBEPOETIN ALFA IN POLYSORBAT 200MCG/0.4
60 PEN INJECTOR (ML) SUBCUTANEOUS ONCE
Refills: 0 | Status: DISCONTINUED | OUTPATIENT
Start: 2023-08-23 | End: 2023-08-30

## 2023-08-23 RX ORDER — ACETAMINOPHEN 500 MG
650 TABLET ORAL ONCE
Refills: 0 | Status: COMPLETED | OUTPATIENT
Start: 2023-08-23 | End: 2023-08-23

## 2023-08-23 RX ADMIN — Medication 1: at 09:01

## 2023-08-23 RX ADMIN — BUMETANIDE 124 MILLIGRAM(S): 0.25 INJECTION INTRAMUSCULAR; INTRAVENOUS at 18:15

## 2023-08-23 RX ADMIN — Medication 3: at 13:17

## 2023-08-23 RX ADMIN — Medication 1300 MILLIGRAM(S): at 13:17

## 2023-08-23 RX ADMIN — Medication 650 MILLIGRAM(S): at 23:16

## 2023-08-23 RX ADMIN — HEPARIN SODIUM 5000 UNIT(S): 5000 INJECTION INTRAVENOUS; SUBCUTANEOUS at 06:03

## 2023-08-23 RX ADMIN — Medication 650 MILLIGRAM(S): at 22:16

## 2023-08-23 RX ADMIN — HEPARIN SODIUM 5000 UNIT(S): 5000 INJECTION INTRAVENOUS; SUBCUTANEOUS at 17:51

## 2023-08-23 RX ADMIN — Medication 100 MILLIGRAM(S): at 06:01

## 2023-08-23 RX ADMIN — Medication 60 MILLIGRAM(S): at 06:02

## 2023-08-23 RX ADMIN — BUMETANIDE 2 MILLIGRAM(S): 0.25 INJECTION INTRAMUSCULAR; INTRAVENOUS at 06:02

## 2023-08-23 RX ADMIN — ISOSORBIDE DINITRATE 10 MILLIGRAM(S): 5 TABLET ORAL at 19:02

## 2023-08-23 RX ADMIN — Medication 125 MICROGRAM(S): at 06:03

## 2023-08-23 RX ADMIN — Medication 100 MILLIGRAM(S): at 22:16

## 2023-08-23 RX ADMIN — ATORVASTATIN CALCIUM 40 MILLIGRAM(S): 80 TABLET, FILM COATED ORAL at 22:15

## 2023-08-23 RX ADMIN — Medication 1300 MILLIGRAM(S): at 06:03

## 2023-08-23 RX ADMIN — Medication 4: at 17:50

## 2023-08-23 RX ADMIN — Medication 1300 MILLIGRAM(S): at 22:15

## 2023-08-23 RX ADMIN — Medication 2: at 22:10

## 2023-08-23 RX ADMIN — Medication 100 MILLIGRAM(S): at 13:18

## 2023-08-23 RX ADMIN — PANTOPRAZOLE SODIUM 40 MILLIGRAM(S): 20 TABLET, DELAYED RELEASE ORAL at 06:03

## 2023-08-23 NOTE — PROGRESS NOTE ADULT - PROBLEM SELECTOR PLAN 2
Patient with elevated potassium on 8/17 mildly hemolyzed. K WNL 8/22. Continue with home sodium bicarbonate. Monitor serum potassium. Low potassium/renal diet.

## 2023-08-23 NOTE — PROGRESS NOTE ADULT - PROBLEM SELECTOR PLAN 3
Anemia of chronic disease and thrombocytopenia. Iron studies reviewed, Tsat 25% Ferritin 753. Aranesp dosed 8/16. will dose Aranesp today 8/23. Management as per primary team.     Final recommendations pending attending signature.    If you have any questions, please feel free to contact me  hCay Koehler  Nephrology Fellow  Onepager/Page [phone]  (After 5pm or on weekends please page the on-call fellow)

## 2023-08-23 NOTE — PROGRESS NOTE ADULT - PROBLEM SELECTOR PLAN 1
Overall stage C HF, NYHA class III-IV- JVP 18cm. Moderately hypervolemic and hypertensive.   ISAMAR on CKD likely in setting of increasing renal venous congestion.   As d/w Dr. Coyle, discontinue oral diuretics. Start Bumex 3 mg IV BID.   Add Isordil 10 mg po TID.   Continue hydralazine 100 mg po TID.   Continue nifedipine 60 mg po daily.   Keep K 4.0-5.0 and mag >2.0.   Daily standing weights and strict I/O.   Check LFTs, proBNP, kappa/lambda chain ratio.   HF booklet provided.   Reviewed CardioMEMS again with patient, and will follow up her decision again tomorrow.  The above communicated with primary ACP.

## 2023-08-23 NOTE — PROGRESS NOTE ADULT - PROBLEM SELECTOR PLAN 1
Pt with ISAMAR on CKD likely in setting of HF exacerbation and anemia. Per [facility], baseline Scr ~2, admitted with Scr of 1.62, now increased to 3.02 (8/22). UA w/ 300 protein. UPCR 4. UOP 500cc in 24hr. Continue PO bumex 2mg BID. Monitor BMP and strict I/Os. Dose meds per eGFR and avoid nephrotoxic agents.

## 2023-08-23 NOTE — PROGRESS NOTE ADULT - SUBJECTIVE AND OBJECTIVE BOX
[facility] Division of Kidney Diseases & Hypertension  FOLLOW UP NOTE  [phone]--------------------------------------------------------------------------------  Chief Complaint:Heart failure    24 hour events/subjective: Pt seen and examined at bedside. continues to complain of mild chest congestion. Denies fevers, HA, CP, SOB, n/v, edema.    PAST HISTORY  --------------------------------------------------------------------------------  No significant changes to PMH, PSH, FHx, SHx, unless otherwise noted    ALLERGIES & MEDICATIONS  --------------------------------------------------------------------------------  Allergies    No Known Allergies    Intolerances    Standing Inpatient Medications  atorvastatin 40 milliGRAM(s) Oral at bedtime  buMETAnide 2 milliGRAM(s) Oral two times a day  darbepoetin Injectable ViaL 60 MICROGram(s) SubCutaneous once  dextrose 5%. 1000 milliLiter(s) IV Continuous <Continuous>  dextrose 5%. 1000 milliLiter(s) IV Continuous <Continuous>  dextrose 50% Injectable 25 Gram(s) IV Push once  dextrose 50% Injectable 12.5 Gram(s) IV Push once  dextrose 50% Injectable 25 Gram(s) IV Push once  glucagon  Injectable 1 milliGRAM(s) IntraMuscular once  heparin   Injectable 5000 Unit(s) SubCutaneous every 12 hours  hydrALAZINE 100 milliGRAM(s) Oral every 8 hours  insulin lispro (ADMELOG) corrective regimen sliding scale   SubCutaneous three times a day before meals  insulin lispro (ADMELOG) corrective regimen sliding scale   SubCutaneous at bedtime  levothyroxine 125 MICROGram(s) Oral daily  NIFEdipine XL 60 milliGRAM(s) Oral daily  pantoprazole    Tablet 40 milliGRAM(s) Oral before breakfast  sodium bicarbonate 1300 milliGRAM(s) Oral three times a day    PRN Inpatient Medications  dextrose Oral Gel 15 Gram(s) Oral once PRN  guaiFENesin Oral Liquid (Sugar-Free) 200 milliGRAM(s) Oral every 6 hours PRN      REVIEW OF SYSTEMS  --------------------------------------------------------------------------------  as above    VITALS/PHYSICAL EXAM  --------------------------------------------------------------------------------  T(C): 36.6 (08-23-23 @ 06:00), Max: 37 (08-22-23 @ 17:08)  HR: 74 (08-23-23 @ 06:00) (72 - 77)  BP: 148/65 (08-23-23 @ 06:00) (128/60 - 148/65)  RR: 17 (08-23-23 @ 06:00) (16 - 18)  SpO2: 100% (08-23-23 @ 06:00) (96% - 100%)  Wt(kg): --      08-22-23 @ 07:01  -  08-23-23 @ 07:00  --------------------------------------------------------  IN: 540 mL / OUT: 500 mL / NET: 40 mL    Physical Exam:  Gen: NAD  HEENT: Anicteric  Pulm: bibasilar crackles  CV: S1S2+  Abd: Soft, +BS    Ext: trace LE edema B/L  Neuro: Awake  Skin: Warm and dry    LABS/STUDIES  --------------------------------------------------------------------------------              7.5    8.02  >-----------<  148      [08-23-23 @ 06:33]              22.4     132  |  95  |  76  ----------------------------<  119      [08-22-23 @ 05:05]  4.8   |  22  |  3.02        Ca     9.3     [08-22-23 @ 05:05]      Mg     1.80     [08-22-23 @ 05:05]      Phos  5.0     [08-22-23 @ 05:05]    Creatinine Trend:  SCr 3.02 [08-22 @ 05:05]  SCr 2.97 [08-21 @ 03:30]  SCr 2.84 [08-20 @ 06:02]  SCr 2.70 [08-19 @ 07:01]  SCr 2.36 [08-18 @ 05:45]    Urinalysis - [08-22-23 @ 05:05]      Color  / Appearance  / SG  / pH       Gluc 119 / Ketone   / Bili  / Urobili        Blood  / Protein  / Leuk Est  / Nitrite       RBC  / WBC  / Hyaline  / Gran  / Sq Epi  / Non Sq Epi  / Bacteria     Urine Creatinine 27      [08-19-23 @ 12:10]  Urine Sodium 95      [08-19-23 @ 12:10]  Urine Urea Nitrogen 350.4      [08-19-23 @ 12:10]  Urine Osmolality 347      [08-19-23 @ 12:10]    Iron 86, TIBC 269, %sat 32      [08-17-23 @ 04:54]  Ferritin 984      [08-17-23 @ 04:54]      STEVO: titer 1:160, pattern Speckled      [08-21-23 @ 03:30]

## 2023-08-23 NOTE — PROGRESS NOTE ADULT - PROBLEM SELECTOR PLAN 1
#Acute hypoxic respiratory failure - resolved  BNP 6228  - TTE 8/6/23 with LVEF 69%, mod-sev TR, severe pulmonary HTN  - Weaned off BIPAP -> now on O2  - Given the discrepancy between LV function and class 2 pulm HTN, consulted cardio - appreciate input  - Strict I/O, daily weight. Pt has had multiple admissions for CHF, please document dry weight upon discharge  Repeat CXR 8/14 shows mild b/l pleural effusions.   She received additional IV bumex per nephrology recs while getting PRBC transfusion for anemia.  Weaned off O2   - complicated by worsening renal function  - now escalated to IV diuresis with bumetanide    Appreciate ongoing nephrology, cardiology, heart failure input

## 2023-08-23 NOTE — PROGRESS NOTE ADULT - SUBJECTIVE AND OBJECTIVE BOX
Medications:  atorvastatin 40 milliGRAM(s) Oral at bedtime  buMETAnide IVPB 3 milliGRAM(s) IV Intermittent two times a day  darbepoetin Injectable ViaL 60 MICROGram(s) SubCutaneous once  darbepoetin Injectable ViaL 60 MICROGram(s) SubCutaneous once  dextrose 5%. 1000 milliLiter(s) IV Continuous <Continuous>  dextrose 5%. 1000 milliLiter(s) IV Continuous <Continuous>  dextrose 50% Injectable 25 Gram(s) IV Push once  dextrose 50% Injectable 25 Gram(s) IV Push once  dextrose 50% Injectable 12.5 Gram(s) IV Push once  dextrose Oral Gel 15 Gram(s) Oral once PRN  glucagon  Injectable 1 milliGRAM(s) IntraMuscular once  guaiFENesin Oral Liquid (Sugar-Free) 200 milliGRAM(s) Oral every 6 hours PRN  heparin   Injectable 5000 Unit(s) SubCutaneous every 12 hours  hydrALAZINE 100 milliGRAM(s) Oral every 8 hours  insulin lispro (ADMELOG) corrective regimen sliding scale   SubCutaneous three times a day before meals  insulin lispro (ADMELOG) corrective regimen sliding scale   SubCutaneous at bedtime  isosorbide   dinitrate Tablet (ISORDIL) 10 milliGRAM(s) Oral three times a day  levothyroxine 125 MICROGram(s) Oral daily  NIFEdipine XL 60 milliGRAM(s) Oral daily  pantoprazole    Tablet 40 milliGRAM(s) Oral before breakfast  sodium bicarbonate 1300 milliGRAM(s) Oral three times a day      Vitals:  Vital Signs Last 24 Hrs  T(C): 36.6 (23 Aug 2023 06:00), Max: 37 (22 Aug 2023 17:08)  T(F): 97.8 (23 Aug 2023 06:00), Max: 98.6 (22 Aug 2023 17:08)  HR: 74 (23 Aug 2023 06:00) (74 - 77)  BP: 148/65 (23 Aug 2023 06:00) (128/60 - 148/65)  BP(mean): --  RR: 17 (23 Aug 2023 06:00) (16 - 18)  SpO2: 100% (23 Aug 2023 06:00) (96% - 100%)    Parameters below as of 23 Aug 2023 06:00  Patient On (Oxygen Delivery Method): room air        Daily     Daily Weight in k.3 (23 Aug 2023 06:00)    I&O's Detail    22 Aug 2023 07:01  -  23 Aug 2023 07:00  --------------------------------------------------------  IN:    Oral Fluid: 540 mL  Total IN: 540 mL    OUT:    Voided (mL): 500 mL  Total OUT: 500 mL    Total NET: 40 mL          Physical Exam:     General: No distress. Comfortable.  HEENT: EOM intact.  Neck: Neck supple. JVP not elevated. No masses  Chest: Clear to auscultation bilaterally  CV: Normal S1 and S2. No murmurs, rub, or gallops. Radial pulses normal.  Abdomen: Soft, non-distended, non-tender  Skin: No rashes or skin breakdown  Neurology: Alert and oriented times three. Sensation intact  Psych: Affect normal    Labs:                        7.5    8.02  )-----------( 148      ( 23 Aug 2023 06:33 )             22.4     08-23    133<L>  |  94<L>  |  73<H>  ----------------------------<  174<H>  4.8   |  25  |  2.93<H>    Ca    9.3      23 Aug 2023 06:33  Phos  4.8     08-23  Mg     1.70     08-23             Patient seen and examined. She states she has MARKS, fatigue and LE edema.   No CP, palpitations, dizziness.       Medications:  atorvastatin 40 milliGRAM(s) Oral at bedtime  buMETAnide IVPB 3 milliGRAM(s) IV Intermittent two times a day  darbepoetin Injectable ViaL 60 MICROGram(s) SubCutaneous once  darbepoetin Injectable ViaL 60 MICROGram(s) SubCutaneous once  dextrose 5%. 1000 milliLiter(s) IV Continuous <Continuous>  dextrose 5%. 1000 milliLiter(s) IV Continuous <Continuous>  dextrose 50% Injectable 25 Gram(s) IV Push once  dextrose 50% Injectable 25 Gram(s) IV Push once  dextrose 50% Injectable 12.5 Gram(s) IV Push once  dextrose Oral Gel 15 Gram(s) Oral once PRN  glucagon  Injectable 1 milliGRAM(s) IntraMuscular once  guaiFENesin Oral Liquid (Sugar-Free) 200 milliGRAM(s) Oral every 6 hours PRN  heparin   Injectable 5000 Unit(s) SubCutaneous every 12 hours  hydrALAZINE 100 milliGRAM(s) Oral every 8 hours  insulin lispro (ADMELOG) corrective regimen sliding scale   SubCutaneous three times a day before meals  insulin lispro (ADMELOG) corrective regimen sliding scale   SubCutaneous at bedtime  isosorbide   dinitrate Tablet (ISORDIL) 10 milliGRAM(s) Oral three times a day  levothyroxine 125 MICROGram(s) Oral daily  NIFEdipine XL 60 milliGRAM(s) Oral daily  pantoprazole    Tablet 40 milliGRAM(s) Oral before breakfast  sodium bicarbonate 1300 milliGRAM(s) Oral three times a day      Vitals:  Vital Signs Last 24 Hrs  T(C): 36.6 (23 Aug 2023 06:00), Max: 37 (22 Aug 2023 17:08)  T(F): 97.8 (23 Aug 2023 06:00), Max: 98.6 (22 Aug 2023 17:08)  HR: 74 (23 Aug 2023 06:00) (74 - 77)  BP: 148/65 (23 Aug 2023 06:00) (128/60 - 148/65)  BP(mean): --  RR: 17 (23 Aug 2023 06:00) (16 - 18)  SpO2: 100% (23 Aug 2023 06:00) (96% - 100%)    Parameters below as of 23 Aug 2023 06:00  Patient On (Oxygen Delivery Method): room air        Daily     Daily Weight in k.3 (23 Aug 2023 06:00)    I&O's Detail    22 Aug 2023 07:01  -  23 Aug 2023 07:00  --------------------------------------------------------  IN:    Oral Fluid: 540 mL  Total IN: 540 mL    OUT:    Voided (mL): 500 mL  Total OUT: 500 mL    Total NET: 40 mL          Physical Exam:     General: No distress. Comfortable.  HEENT: EOM intact.  Neck: Neck supple. JVP elevated 20 cm. No masses  Chest: Clear to auscultation bilaterally  CV: Normal S1 and S2. No murmurs, rub, or gallops. Radial pulses normal. 2+ b/l LE edema and is warm b/l.   Abdomen: Soft, non-distended, non-tender  Skin: No rashes or skin breakdown  Neurology: Alert and oriented times three. Sensation intact  Psych: Affect normal    Labs:                        7.5    8.02  )-----------( 148      ( 23 Aug 2023 06:33 )             22.4     08-23    133<L>  |  94<L>  |  73<H>  ----------------------------<  174<H>  4.8   |  25  |  2.93<H>    Ca    9.3      23 Aug 2023 06:33  Phos  4.8     08-23  Mg     1.70     08-23

## 2023-08-23 NOTE — PROGRESS NOTE ADULT - SUBJECTIVE AND OBJECTIVE BOX
[facility]  Richmond Saeed DO  Available via MS Teams  In house pager [phone]    SUBJECTIVE / OVERNIGHT EVENTS:  No acute events overnight. Denies acute complaints.  Still really eager to go home but understands we are watching her blood work and physical exam.    ADDITIONAL REVIEW OF SYSTEMS:    MEDICATIONS  (STANDING):  atorvastatin 40 milliGRAM(s) Oral at bedtime  buMETAnide IVPB 3 milliGRAM(s) IV Intermittent two times a day  darbepoetin Injectable ViaL 60 MICROGram(s) SubCutaneous once  darbepoetin Injectable ViaL 60 MICROGram(s) SubCutaneous once  dextrose 5%. 1000 milliLiter(s) (100 mL/Hr) IV Continuous <Continuous>  dextrose 5%. 1000 milliLiter(s) (50 mL/Hr) IV Continuous <Continuous>  dextrose 50% Injectable 25 Gram(s) IV Push once  dextrose 50% Injectable 12.5 Gram(s) IV Push once  dextrose 50% Injectable 25 Gram(s) IV Push once  glucagon  Injectable 1 milliGRAM(s) IntraMuscular once  heparin   Injectable 5000 Unit(s) SubCutaneous every 12 hours  hydrALAZINE 100 milliGRAM(s) Oral every 8 hours  insulin lispro (ADMELOG) corrective regimen sliding scale   SubCutaneous at bedtime  insulin lispro (ADMELOG) corrective regimen sliding scale   SubCutaneous three times a day before meals  isosorbide   dinitrate Tablet (ISORDIL) 10 milliGRAM(s) Oral three times a day  levothyroxine 125 MICROGram(s) Oral daily  NIFEdipine XL 60 milliGRAM(s) Oral daily  pantoprazole    Tablet 40 milliGRAM(s) Oral before breakfast  sodium bicarbonate 1300 milliGRAM(s) Oral three times a day    MEDICATIONS  (PRN):  dextrose Oral Gel 15 Gram(s) Oral once PRN Blood Glucose LESS THAN 70 milliGRAM(s)/deciliter  guaiFENesin Oral Liquid (Sugar-Free) 200 milliGRAM(s) Oral every 6 hours PRN Cough      I&O's Summary    22 Aug 2023 07:01  -  23 Aug 2023 07:00  --------------------------------------------------------  IN: 540 mL / OUT: 500 mL / NET: 40 mL        PHYSICAL EXAM:  Vital Signs Last 24 Hrs  T(C): 36.6 (23 Aug 2023 13:15), Max: 37 (22 Aug 2023 17:08)  T(F): 97.8 (23 Aug 2023 13:15), Max: 98.6 (22 Aug 2023 17:08)  HR: 70 (23 Aug 2023 13:15) (70 - 77)  BP: 139/61 (23 Aug 2023 13:15) (128/60 - 148/65)  BP(mean): --  RR: 18 (23 Aug 2023 13:15) (16 - 18)  SpO2: 98% (23 Aug 2023 13:15) (96% - 100%)    Parameters below as of 23 Aug 2023 13:15  Patient On (Oxygen Delivery Method): room air      CONSTITUTIONAL: NAD,  well-groomed  EYES: PERRLA; conjunctiva and sclera clear  ENMT: Moist oral mucosa, no pharyngeal injection or exudates; normal dentition  NECK: Supple, no palpable masses; no thyromegaly  RESPIRATORY: Normal respiratory effort; bibasilar crackles  CARDIOVASCULAR: Regular rate and rhythm, normal S1 and S2, no murmur/rub/gallop; No lower extremity edema; Peripheral pulses are 2+ bilaterally  ABDOMEN: Nontender to palpation, normoactive bowel sounds, no rebound/guarding; No hepatosplenomegaly  MUSCULOSKELETAL:  no clubbing or cyanosis of digits; no joint swelling or tenderness to palpation  PSYCH: A+O to person, place, and time; affect appropriate  NEUROLOGY: CN 2-12 are intact and symmetric; no gross sensory deficits   SKIN: No rashes; no palpable lesions    LABS:                        7.5    8.02  )-----------( 148      ( 23 Aug 2023 06:33 )             22.4     08-23    133<L>  |  94<L>  |  73<H>  ----------------------------<  174<H>  4.8   |  25  |  2.93<H>    Ca    9.3      23 Aug 2023 06:33  Phos  4.8     08-23  Mg     1.70     08-23            Urinalysis Basic - ( 23 Aug 2023 06:33 )    Color: x / Appearance: x / SG: x / pH: x  Gluc: 174 mg/dL / Ketone: x  / Bili: x / Urobili: x   Blood: x / Protein: x / Nitrite: x   Leuk Esterase: x / RBC: x / WBC x   Sq Epi: x / Non Sq Epi: x / Bacteria: x        SARS-CoV-2: NotDetec (11 Aug 2023 21:46)  SARS-CoV-2: NotDetec (22 Jun 2023 05:10)

## 2023-08-23 NOTE — PROGRESS NOTE ADULT - ASSESSMENT
58 year old Female with frequent hospital admissions, PMH of  HTN, HLD, DM2, asthma, CKD (baseline SCr ~2.0), hypothyroidism c/b myxedema coma in past), anemia requiring recent transfusions (followed by GI with plan for scope), and HFpEF with severe pulmonary hypertension (EF 64%;LVIDd 4.6 RVE with normal RV function, mod-severe TR and severe PH RVSP~65). Patient presented with c/o progressive worsening SOB/MARKS and 3 pillow orthopnea X 5days; treated in the ED with IV Lasix 40mg followed by IV Bumex 2mg daily on 8/12 and 8/13.      HF Consult request for diuretic management and CardioMEMS implant.     Pertinent Labs: BNP  6, 450     Lactate 1.0    Troponin levels    51/48    BUN/Cr  66/2.3    TSH: 4.8  T3/T4  56/1.9            K/L  (1/2023)  8.9/3.75 ratio 2.35    CXR: Mild bilateral pleural effusions    EKG:  Normal sinus rhythm, possible Left atrial enlargement    TTE: Normal LV systolic function, Right ventricular enlargement with normal RV systolic function, Moderate-severe TR and PASP equals 65 consistent with PH.    RHC (1/2023):  RA 17 PA 58/27/41  PCWP  22 CO/CI 8.8/5.3  and PVR 2.16 POWERS     Overall stage C HF, NYHA class III-IV- moderately hypervolemic and hypertensive.

## 2023-08-24 LAB
ANION GAP SERPL CALC-SCNC: 16 MMOL/L — HIGH (ref 7–14)
BUN SERPL-MCNC: 72 MG/DL — HIGH (ref 7–23)
CALCIUM SERPL-MCNC: 9 MG/DL — SIGNIFICANT CHANGE UP (ref 8.4–10.5)
CHLORIDE SERPL-SCNC: 89 MMOL/L — LOW (ref 98–107)
CO2 SERPL-SCNC: 22 MMOL/L — SIGNIFICANT CHANGE UP (ref 22–31)
CREAT SERPL-MCNC: 2.95 MG/DL — HIGH (ref 0.5–1.3)
EGFR: 18 ML/MIN/1.73M2 — LOW
GLUCOSE BLDC GLUCOMTR-MCNC: 151 MG/DL — HIGH (ref 70–99)
GLUCOSE BLDC GLUCOMTR-MCNC: 261 MG/DL — HIGH (ref 70–99)
GLUCOSE BLDC GLUCOMTR-MCNC: 344 MG/DL — HIGH (ref 70–99)
GLUCOSE BLDC GLUCOMTR-MCNC: 391 MG/DL — HIGH (ref 70–99)
GLUCOSE SERPL-MCNC: 349 MG/DL — HIGH (ref 70–99)
IGA FLD-MCNC: 265 MG/DL — SIGNIFICANT CHANGE UP (ref 84–499)
IGG FLD-MCNC: 1151 MG/DL — SIGNIFICANT CHANGE UP (ref 610–1660)
IGM SERPL-MCNC: 52 MG/DL — SIGNIFICANT CHANGE UP (ref 35–242)
KAPPA LC SER QL IFE: 10.9 MG/DL — HIGH (ref 0.33–1.94)
KAPPA/LAMBDA FREE LIGHT CHAIN RATIO, SERUM: 1.72 RATIO — HIGH (ref 0.26–1.65)
LAMBDA LC SER QL IFE: 6.34 MG/DL — HIGH (ref 0.57–2.63)
MAGNESIUM SERPL-MCNC: 1.6 MG/DL — SIGNIFICANT CHANGE UP (ref 1.6–2.6)
PHOSPHATE SERPL-MCNC: 4.5 MG/DL — SIGNIFICANT CHANGE UP (ref 2.5–4.5)
POTASSIUM SERPL-MCNC: 5.6 MMOL/L — HIGH (ref 3.5–5.3)
POTASSIUM SERPL-SCNC: 5.6 MMOL/L — HIGH (ref 3.5–5.3)
SODIUM SERPL-SCNC: 127 MMOL/L — LOW (ref 135–145)

## 2023-08-24 PROCEDURE — 99232 SBSQ HOSP IP/OBS MODERATE 35: CPT | Mod: GC

## 2023-08-24 PROCEDURE — 99232 SBSQ HOSP IP/OBS MODERATE 35: CPT

## 2023-08-24 PROCEDURE — 99233 SBSQ HOSP IP/OBS HIGH 50: CPT

## 2023-08-24 RX ADMIN — Medication 1300 MILLIGRAM(S): at 05:42

## 2023-08-24 RX ADMIN — BUMETANIDE 124 MILLIGRAM(S): 0.25 INJECTION INTRAMUSCULAR; INTRAVENOUS at 05:44

## 2023-08-24 RX ADMIN — Medication 3: at 23:29

## 2023-08-24 RX ADMIN — Medication 1300 MILLIGRAM(S): at 23:20

## 2023-08-24 RX ADMIN — Medication 100 MILLIGRAM(S): at 05:43

## 2023-08-24 RX ADMIN — Medication 1: at 09:09

## 2023-08-24 RX ADMIN — BUMETANIDE 124 MILLIGRAM(S): 0.25 INJECTION INTRAMUSCULAR; INTRAVENOUS at 15:31

## 2023-08-24 RX ADMIN — ISOSORBIDE DINITRATE 10 MILLIGRAM(S): 5 TABLET ORAL at 05:43

## 2023-08-24 RX ADMIN — ISOSORBIDE DINITRATE 10 MILLIGRAM(S): 5 TABLET ORAL at 12:46

## 2023-08-24 RX ADMIN — Medication 1300 MILLIGRAM(S): at 13:25

## 2023-08-24 RX ADMIN — HEPARIN SODIUM 5000 UNIT(S): 5000 INJECTION INTRAVENOUS; SUBCUTANEOUS at 17:22

## 2023-08-24 RX ADMIN — PANTOPRAZOLE SODIUM 40 MILLIGRAM(S): 20 TABLET, DELAYED RELEASE ORAL at 05:43

## 2023-08-24 RX ADMIN — Medication 100 MILLIGRAM(S): at 13:27

## 2023-08-24 RX ADMIN — ISOSORBIDE DINITRATE 10 MILLIGRAM(S): 5 TABLET ORAL at 17:22

## 2023-08-24 RX ADMIN — ATORVASTATIN CALCIUM 40 MILLIGRAM(S): 80 TABLET, FILM COATED ORAL at 23:20

## 2023-08-24 RX ADMIN — Medication 4: at 13:24

## 2023-08-24 RX ADMIN — Medication 125 MICROGRAM(S): at 05:44

## 2023-08-24 RX ADMIN — Medication 3: at 18:14

## 2023-08-24 RX ADMIN — HEPARIN SODIUM 5000 UNIT(S): 5000 INJECTION INTRAVENOUS; SUBCUTANEOUS at 05:43

## 2023-08-24 RX ADMIN — Medication 100 MILLIGRAM(S): at 23:20

## 2023-08-24 RX ADMIN — Medication 60 MILLIGRAM(S): at 05:43

## 2023-08-24 NOTE — PROVIDER CONTACT NOTE (OTHER) - BACKGROUND
Pt admitted for Heart Failure
Patient admitted for heart failure. PMH HTN, DM, CKD.
Pt admitted for Heart Failure

## 2023-08-24 NOTE — PROGRESS NOTE ADULT - SUBJECTIVE AND OBJECTIVE BOX
Interval History:  Patient resting comfortably in bed   Denies CP/SOB/palpitations/dizziness  No acute events overnight      Medications:  atorvastatin 40 milliGRAM(s) Oral at bedtime  buMETAnide IVPB 3 milliGRAM(s) IV Intermittent two times a day  darbepoetin Injectable ViaL 60 MICROGram(s) SubCutaneous once  darbepoetin Injectable ViaL 60 MICROGram(s) SubCutaneous once  dextrose 5%. 1000 milliLiter(s) IV Continuous <Continuous>  dextrose 5%. 1000 milliLiter(s) IV Continuous <Continuous>  dextrose 50% Injectable 25 Gram(s) IV Push once  dextrose 50% Injectable 12.5 Gram(s) IV Push once  dextrose 50% Injectable 25 Gram(s) IV Push once  dextrose Oral Gel 15 Gram(s) Oral once PRN  glucagon  Injectable 1 milliGRAM(s) IntraMuscular once  guaiFENesin Oral Liquid (Sugar-Free) 200 milliGRAM(s) Oral every 6 hours PRN  heparin   Injectable 5000 Unit(s) SubCutaneous every 12 hours  hydrALAZINE 100 milliGRAM(s) Oral every 8 hours  insulin lispro (ADMELOG) corrective regimen sliding scale   SubCutaneous three times a day before meals  insulin lispro (ADMELOG) corrective regimen sliding scale   SubCutaneous at bedtime  isosorbide   dinitrate Tablet (ISORDIL) 10 milliGRAM(s) Oral three times a day  levothyroxine 125 MICROGram(s) Oral daily  NIFEdipine XL 60 milliGRAM(s) Oral daily  pantoprazole    Tablet 40 milliGRAM(s) Oral before breakfast  sodium bicarbonate 1300 milliGRAM(s) Oral three times a day      Vitals:  T(C): 36.3 (23 @ 05:40), Max: 36.6 (23 @ 13:15)  HR: 67 (23 @ 05:40) (66 - 70)  BP: 151/67 (23 @ 05:40) (139/61 - 151/67)  BP(mean): --  RR: 16 (23 @ 05:40) (16 - 18)  SpO2: 100% (23 @ 05:40) (98% - 100%)    Daily     Daily Weight in k.7 (24 Aug 2023 05:40)        I&O's Summary      Physical Exam:  Appearance: No Acute Distress  Neck: JVP  Cardiovascular: Normal S1 S2  Respiratory: Clear to auscultation bilaterally  Gastrointestinal: Soft, Non-tender	  Skin: No cyanosis	  Neurologic: Non-focal  Extremities: No LE edema  Psychiatry: A & O x 3, Mood & affect appropriate    Labs:                        7.5    8.02  )-----------( 148      ( 23 Aug 2023 06:33 )             22.4     08-    133<L>  |  94<L>  |  73<H>  ----------------------------<  174<H>  4.8   |  25  |  2.93<H>    Ca    9.3      23 Aug 2023 06:33  Phos  4.8       Mg     1.70         TPro  7.5  /  Alb  3.8  /  TBili  0.6  /  DBili  <0.2  /  AST  38<H>  /  ALT  37<H>  /  AlkPhos  439<H>          TELEMETRY:    Echocardiogram:  < from: Transthoracic Echocardiogram (23 @ 12:30) >  DIMENSIONS:  Dimensions:     Normal Values:  LA:     3.6 cm    2.0 - 4.0 cm  Ao:     2.2 cm    2.0 - 3.8 cm  SEPTUM: 0.7 cm    0.6 - 1.2 cm  PWT:    0.9 cm    0.6 - 1.1 cm  LVIDd:  4.6 cm    3.0 - 5.6 cm  LVIDs:  2.6 cm    1.8 - 4.0 cm  Derived Variables:  LVMI: 72 g/m2  RWT: 0.39  Fractional short: 43 %  Ejection Fraction (Modified Macedo Rule): 69 %  ------------------------------------------------------------------------  OBSERVATIONS:  Mitral Valve: Normal mitral valve. Minimal mitral  regurgitation.  Aortic Root: Normal aortic root.  Aortic Valve: Normal trileaflet aortic valve.  Left Atrium: Moderately dilated left atrium.  LA volume  index = 45 cc/m2.  Left Ventricle: Normal left ventricular systolic function.  No segmental wall motion abnormalities. Normal left  ventricular internal dimensions and wall thicknesses.  (DT:152 ms).  Right Heart: Normal right atrium. Right ventricular  enlargement with normal right ventricular systolic  function. Normal tricuspid valve.  Moderate-severe  tricuspid regurgitation. Normal pulmonic valve. Moderate  pulmonic regurgitation.  Pericardium/PleuraNormal pericardium with trace pericardial  effusion. Right pleural effusion.  Hemodynamic: Estimated right ventricular systolic pressure  equals 65 mm Hg, assuming right atrial pressure equals 10  mm Hg, consistent with severe pulmonary hypertension.  ------------------------------------------------------------------------  CONCLUSIONS:  1. Normal left ventricular systolic function. No segmental  wall motion abnormalities.  2. Right ventricular enlargement with normal right  ventricular systolic function.  3. Normal tricuspid valve.Moderate-severe tricuspid  regurgitation.  4. Estimated pulmonary artery systolic pressure equals 65  mm Hg, assuming right atrial pressure equals 10  mm Hg,  consistent with severe pulmonary hypertension.  *** Compared with echocardiogram of 2023, no  significant changes noted.         Interval History:  Patient resting comfortably in bed   Denies CP/SOB/palpitations/dizziness  No acute events overnight      Medications:  atorvastatin 40 milliGRAM(s) Oral at bedtime  buMETAnide IVPB 3 milliGRAM(s) IV Intermittent two times a day  darbepoetin Injectable ViaL 60 MICROGram(s) SubCutaneous once  darbepoetin Injectable ViaL 60 MICROGram(s) SubCutaneous once  dextrose 5%. 1000 milliLiter(s) IV Continuous <Continuous>  dextrose 5%. 1000 milliLiter(s) IV Continuous <Continuous>  dextrose 50% Injectable 25 Gram(s) IV Push once  dextrose 50% Injectable 12.5 Gram(s) IV Push once  dextrose 50% Injectable 25 Gram(s) IV Push once  dextrose Oral Gel 15 Gram(s) Oral once PRN  glucagon  Injectable 1 milliGRAM(s) IntraMuscular once  guaiFENesin Oral Liquid (Sugar-Free) 200 milliGRAM(s) Oral every 6 hours PRN  heparin   Injectable 5000 Unit(s) SubCutaneous every 12 hours  hydrALAZINE 100 milliGRAM(s) Oral every 8 hours  insulin lispro (ADMELOG) corrective regimen sliding scale   SubCutaneous three times a day before meals  insulin lispro (ADMELOG) corrective regimen sliding scale   SubCutaneous at bedtime  isosorbide   dinitrate Tablet (ISORDIL) 10 milliGRAM(s) Oral three times a day  levothyroxine 125 MICROGram(s) Oral daily  NIFEdipine XL 60 milliGRAM(s) Oral daily  pantoprazole    Tablet 40 milliGRAM(s) Oral before breakfast  sodium bicarbonate 1300 milliGRAM(s) Oral three times a day      Vitals:  T(C): 36.3 (23 @ 05:40), Max: 36.6 (23 @ 13:15)  HR: 67 (23 @ 05:40) (66 - 70)  BP: 151/67 (23 @ 05:40) (139/61 - 151/67)  BP(mean): --  RR: 16 (23 @ 05:40) (16 - 18)  SpO2: 100% (23 @ 05:40) (98% - 100%)    Daily     Daily Weight in k.7 (24 Aug 2023 05:40)        I&O's Summary      Physical Exam:  Appearance: No Acute Distress  Neck: JVP  Cardiovascular: Normal S1 S2  Respiratory: Clear to auscultation bilaterally  Gastrointestinal: Soft, Non-tender	  Skin: No cyanosis	  Neurologic: Non-focal  Extremities: No LE edema  Psychiatry: A & O x 3, Mood & affect appropriate    Labs:                        7.5    8.02  )-----------( 148      ( 23 Aug 2023 06:33 )             22.4         133<L>  |  94<L>  |  73<H>  ----------------------------<  174<H>  4.8   |  25  |  2.93<H>    Ca    9.3      23 Aug 2023 06:33  Phos  4.8       Mg     1.70         TPro  7.5  /  Alb  3.8  /  TBili  0.6  /  DBili  <0.2  /  AST  38<H>  /  ALT  37<H>  /  AlkPhos  439<H>      Pro-Brain Natriuretic Peptide: 5244:  (23 @ 06:33)        TELEMETRY:    Echocardiogram:  < from: Transthoracic Echocardiogram (23 @ 12:30) >  DIMENSIONS:  Dimensions:     Normal Values:  LA:     3.6 cm    2.0 - 4.0 cm  Ao:     2.2 cm    2.0 - 3.8 cm  SEPTUM: 0.7 cm    0.6 - 1.2 cm  PWT:    0.9 cm    0.6 - 1.1 cm  LVIDd:  4.6 cm    3.0 - 5.6 cm  LVIDs:  2.6 cm    1.8 - 4.0 cm  Derived Variables:  LVMI: 72 g/m2  RWT: 0.39  Fractional short: 43 %  Ejection Fraction (Modified Macedo Rule): 69 %  ------------------------------------------------------------------------  OBSERVATIONS:  Mitral Valve: Normal mitral valve. Minimal mitral  regurgitation.  Aortic Root: Normal aortic root.  Aortic Valve: Normal trileaflet aortic valve.  Left Atrium: Moderately dilated left atrium.  LA volume  index = 45 cc/m2.  Left Ventricle: Normal left ventricular systolic function.  No segmental wall motion abnormalities. Normal left  ventricular internal dimensions and wall thicknesses.  (DT:152 ms).  Right Heart: Normal right atrium. Right ventricular  enlargement with normal right ventricular systolic  function. Normal tricuspid valve.  Moderate-severe  tricuspid regurgitation. Normal pulmonic valve. Moderate  pulmonic regurgitation.  Pericardium/PleuraNormal pericardium with trace pericardial  effusion. Right pleural effusion.  Hemodynamic: Estimated right ventricular systolic pressure  equals 65 mm Hg, assuming right atrial pressure equals 10  mm Hg, consistent with severe pulmonary hypertension.  ------------------------------------------------------------------------  CONCLUSIONS:  1. Normal left ventricular systolic function. No segmental  wall motion abnormalities.  2. Right ventricular enlargement with normal right  ventricular systolic function.  3. Normal tricuspid valve.Moderate-severe tricuspid  regurgitation.  4. Estimated pulmonary artery systolic pressure equals 65  mm Hg, assuming right atrial pressure equals 10  mm Hg,  consistent with severe pulmonary hypertension.  *** Compared with echocardiogram of 2023, no  significant changes noted.         Interval History:  Patient resting comfortably in bed   Denies CP/SOB/palpitations/dizziness  No acute events overnight      Medications:  atorvastatin 40 milliGRAM(s) Oral at bedtime  buMETAnide IVPB 3 milliGRAM(s) IV Intermittent two times a day  darbepoetin Injectable ViaL 60 MICROGram(s) SubCutaneous once  darbepoetin Injectable ViaL 60 MICROGram(s) SubCutaneous once  dextrose 5%. 1000 milliLiter(s) IV Continuous <Continuous>  dextrose 5%. 1000 milliLiter(s) IV Continuous <Continuous>  dextrose 50% Injectable 25 Gram(s) IV Push once  dextrose 50% Injectable 12.5 Gram(s) IV Push once  dextrose 50% Injectable 25 Gram(s) IV Push once  dextrose Oral Gel 15 Gram(s) Oral once PRN  glucagon  Injectable 1 milliGRAM(s) IntraMuscular once  guaiFENesin Oral Liquid (Sugar-Free) 200 milliGRAM(s) Oral every 6 hours PRN  heparin   Injectable 5000 Unit(s) SubCutaneous every 12 hours  hydrALAZINE 100 milliGRAM(s) Oral every 8 hours  insulin lispro (ADMELOG) corrective regimen sliding scale   SubCutaneous three times a day before meals  insulin lispro (ADMELOG) corrective regimen sliding scale   SubCutaneous at bedtime  isosorbide   dinitrate Tablet (ISORDIL) 10 milliGRAM(s) Oral three times a day  levothyroxine 125 MICROGram(s) Oral daily  NIFEdipine XL 60 milliGRAM(s) Oral daily  pantoprazole    Tablet 40 milliGRAM(s) Oral before breakfast  sodium bicarbonate 1300 milliGRAM(s) Oral three times a day      Vitals:  T(C): 36.3 (23 @ 05:40), Max: 36.6 (23 @ 13:15)  HR: 67 (23 @ 05:40) (66 - 70)  BP: 151/67 (23 @ 05:40) (139/61 - 151/67)  BP(mean): --  RR: 16 (23 @ 05:40) (16 - 18)  SpO2: 100% (23 @ 05:40) (98% - 100%)    Daily     Daily Weight in k.7 (24 Aug 2023 05:40)        I&O's Summary      Physical Exam:  Appearance: No Acute Distress  Neck: JVP to mandible  Cardiovascular: Normal S1 S2  Respiratory: Clear to auscultation bilaterally  Gastrointestinal: Soft, Non-tender	  Skin: No cyanosis	  Neurologic: Non-focal  Extremities: No LE edema  Psychiatry: A & O x 3, Mood & affect appropriate    Labs:                        7.5    8.02  )-----------( 148      ( 23 Aug 2023 06:33 )             22.4         133<L>  |  94<L>  |  73<H>  ----------------------------<  174<H>  4.8   |  25  |  2.93<H>    Ca    9.3      23 Aug 2023 06:33  Phos  4.8       Mg     1.70         TPro  7.5  /  Alb  3.8  /  TBili  0.6  /  DBili  <0.2  /  AST  38<H>  /  ALT  37<H>  /  AlkPhos  439<H>      Pro-Brain Natriuretic Peptide: 5244:  (23 @ 06:33)        TELEMETRY:    Echocardiogram:  < from: Transthoracic Echocardiogram (23 @ 12:30) >  DIMENSIONS:  Dimensions:     Normal Values:  LA:     3.6 cm    2.0 - 4.0 cm  Ao:     2.2 cm    2.0 - 3.8 cm  SEPTUM: 0.7 cm    0.6 - 1.2 cm  PWT:    0.9 cm    0.6 - 1.1 cm  LVIDd:  4.6 cm    3.0 - 5.6 cm  LVIDs:  2.6 cm    1.8 - 4.0 cm  Derived Variables:  LVMI: 72 g/m2  RWT: 0.39  Fractional short: 43 %  Ejection Fraction (Modified Macedo Rule): 69 %  ------------------------------------------------------------------------  OBSERVATIONS:  Mitral Valve: Normal mitral valve. Minimal mitral  regurgitation.  Aortic Root: Normal aortic root.  Aortic Valve: Normal trileaflet aortic valve.  Left Atrium: Moderately dilated left atrium.  LA volume  index = 45 cc/m2.  Left Ventricle: Normal left ventricular systolic function.  No segmental wall motion abnormalities. Normal left  ventricular internal dimensions and wall thicknesses.  (DT:152 ms).  Right Heart: Normal right atrium. Right ventricular  enlargement with normal right ventricular systolic  function. Normal tricuspid valve.  Moderate-severe  tricuspid regurgitation. Normal pulmonic valve. Moderate  pulmonic regurgitation.  Pericardium/PleuraNormal pericardium with trace pericardial  effusion. Right pleural effusion.  Hemodynamic: Estimated right ventricular systolic pressure  equals 65 mm Hg, assuming right atrial pressure equals 10  mm Hg, consistent with severe pulmonary hypertension.  ------------------------------------------------------------------------  CONCLUSIONS:  1. Normal left ventricular systolic function. No segmental  wall motion abnormalities.  2. Right ventricular enlargement with normal right  ventricular systolic function.  3. Normal tricuspid valve.Moderate-severe tricuspid  regurgitation.  4. Estimated pulmonary artery systolic pressure equals 65  mm Hg, assuming right atrial pressure equals 10  mm Hg,  consistent with severe pulmonary hypertension.  *** Compared with echocardiogram of 2023, no  significant changes noted.

## 2023-08-24 NOTE — PROGRESS NOTE ADULT - SUBJECTIVE AND OBJECTIVE BOX
[facility]  Richmond SaeedDO  Available via MS Teams  In house pager [phone]    SUBJECTIVE / OVERNIGHT EVENTS:  No acute events overnight.  Patient denies acute complaints.   Says she spoke to the heart failure team who urged her to consider cardioMEMS device, but patient is still unsure about it.    ADDITIONAL REVIEW OF SYSTEMS:    MEDICATIONS  (STANDING):  atorvastatin 40 milliGRAM(s) Oral at bedtime  buMETAnide IVPB 3 milliGRAM(s) IV Intermittent two times a day  darbepoetin Injectable ViaL 60 MICROGram(s) SubCutaneous once  darbepoetin Injectable ViaL 60 MICROGram(s) SubCutaneous once  dextrose 5%. 1000 milliLiter(s) (50 mL/Hr) IV Continuous <Continuous>  dextrose 5%. 1000 milliLiter(s) (100 mL/Hr) IV Continuous <Continuous>  dextrose 50% Injectable 25 Gram(s) IV Push once  dextrose 50% Injectable 12.5 Gram(s) IV Push once  dextrose 50% Injectable 25 Gram(s) IV Push once  glucagon  Injectable 1 milliGRAM(s) IntraMuscular once  heparin   Injectable 5000 Unit(s) SubCutaneous every 12 hours  hydrALAZINE 100 milliGRAM(s) Oral every 8 hours  insulin lispro (ADMELOG) corrective regimen sliding scale   SubCutaneous three times a day before meals  insulin lispro (ADMELOG) corrective regimen sliding scale   SubCutaneous at bedtime  isosorbide   dinitrate Tablet (ISORDIL) 10 milliGRAM(s) Oral three times a day  levothyroxine 125 MICROGram(s) Oral daily  NIFEdipine XL 60 milliGRAM(s) Oral daily  pantoprazole    Tablet 40 milliGRAM(s) Oral before breakfast  sodium bicarbonate 1300 milliGRAM(s) Oral three times a day    MEDICATIONS  (PRN):  dextrose Oral Gel 15 Gram(s) Oral once PRN Blood Glucose LESS THAN 70 milliGRAM(s)/deciliter  guaiFENesin Oral Liquid (Sugar-Free) 200 milliGRAM(s) Oral every 6 hours PRN Cough      I&O's Summary      PHYSICAL EXAM:  Vital Signs Last 24 Hrs  T(C): 36.3 (24 Aug 2023 12:40), Max: 36.6 (23 Aug 2023 13:15)  T(F): 97.3 (24 Aug 2023 12:40), Max: 97.8 (23 Aug 2023 13:15)  HR: 69 (24 Aug 2023 12:40) (66 - 70)  BP: 134/62 (24 Aug 2023 12:40) (134/62 - 151/67)  BP(mean): --  RR: 18 (24 Aug 2023 12:40) (16 - 18)  SpO2: 100% (24 Aug 2023 12:40) (98% - 100%)    Parameters below as of 24 Aug 2023 12:40  Patient On (Oxygen Delivery Method): room air      CONSTITUTIONAL: NAD, well-developed, well-groomed  EYES: PERRLA; conjunctiva and sclera clear  ENMT: Moist oral mucosa, no pharyngeal injection or exudates; normal dentition  NECK: Supple, no palpable masses; no thyromegaly  RESPIRATORY: Normal respiratory effort; lungs are clear to auscultation bilaterally  CARDIOVASCULAR: Regular rate and rhythm, normal S1 and S2, no murmur/rub/gallop; No lower extremity edema; Peripheral pulses are 2+ bilaterally  ABDOMEN: Nontender to palpation, normoactive bowel sounds, no rebound/guarding; No hepatosplenomegaly  MUSCULOSKELETAL:  no clubbing or cyanosis of digits; no joint swelling or tenderness to palpation  PSYCH: A+O to person, place, and time; affect appropriate  NEUROLOGY: CN 2-12 are intact and symmetric; no gross sensory deficits   SKIN: No rashes; no palpable lesions    LABS:                        7.5    8.02  )-----------( 148      ( 23 Aug 2023 06:33 )             22.4     08-23    133<L>  |  94<L>  |  73<H>  ----------------------------<  174<H>  4.8   |  25  |  2.93<H>    Ca    9.3      23 Aug 2023 06:33  Phos  4.8     08-23  Mg     1.70     08-23    TPro  7.5  /  Alb  3.8  /  TBili  0.6  /  DBili  <0.2  /  AST  38<H>  /  ALT  37<H>  /  AlkPhos  439<H>  08-23          Urinalysis Basic - ( 23 Aug 2023 06:33 )    Color: x / Appearance: x / SG: x / pH: x  Gluc: 174 mg/dL / Ketone: x  / Bili: x / Urobili: x   Blood: x / Protein: x / Nitrite: x   Leuk Esterase: x / RBC: x / WBC x   Sq Epi: x / Non Sq Epi: x / Bacteria: x        SARS-CoV-2: NotDetec (11 Aug 2023 21:46)  SARS-CoV-2: NotDetec (22 Jun 2023 05:10)

## 2023-08-24 NOTE — PROGRESS NOTE ADULT - PROBLEM SELECTOR PLAN 1
IV Bumex 3mg Q12H  Hydralazine 100mg TID (hold SBP<90)  ISDN 10mg TID (hold SBP<90)  Strict I/O  Daily standing weights  Monitor lytes replete K>4.0 and Mg>2.0   Trend SCr  BNP = 5240 (6220 on 8/18)   Management per primary team  Appreciate Nephrology recommendations  HF counseling provided including CardioMEMs  Pending final recommendations from HF attending

## 2023-08-24 NOTE — PROGRESS NOTE ADULT - PROBLEM SELECTOR PLAN 1
Pt with ISAMAR on CKD likely in setting of HF exacerbation and anemia. Per [facility], baseline Scr ~2, admitted with Scr of 1.62, now increased to 2.93 (8/23), labs for today pending. UA w/ 300 protein. UPCR 4. UOP 500cc in 24hr. Yesterday (8/23) pt started on bumex 3mg IVPB. HF consult recs reviewed. Monitor BMP and strict I/Os. Dose meds per eGFR and avoid nephrotoxic agents.

## 2023-08-24 NOTE — PROGRESS NOTE ADULT - PROBLEM SELECTOR PLAN 2
Patient with elevated potassium on 8/17 mildly hemolyzed. K WNL 8/23. Continue with home sodium bicarbonate. Monitor serum potassium. Low potassium/renal diet.

## 2023-08-24 NOTE — PROGRESS NOTE ADULT - ASSESSMENT
58 year old Female with PMH of  HTN, HLD, DM2, asthma, CKD (baseline SCr ~2.0), hypothyroidism c/b myxedema coma in past), anemia requiring recent transfusions (followed by GI with plan for scope), and HFpEF with severe pulmonary hypertension (EF 64%;LVIDd 4.6 RVE with normal RV function, mod-severe TR and severe PH RVSP~65). Patient presented with c/o progressive worsening SOB/MARKS and 3 pillow orthopnea X 5days; treated in the ED with IV Lasix 40mg followed by IV Bumex 2mg daily on 8/12 and 8/13.        HF Consult request for diuretic management and CardioMEMS implant (discussed/reviewed with patient and booklet provided, she will consider as OP)         Pertinent Labs: BNP  6, 450     Lactate 1.0    Troponin levels    51/48    BUN/Cr  66/2.3    TSH: 4.8  T3/T4  56/1.9            K/L  (1/2023)  8.9/3.75 ratio 2.35    CXR: Mild bilateral pleural effusions    EKG:  Normal sinus rhythm, possible Left atrial enlargement    TTE: Normal LV systolic function, Right ventricular enlargement with normal RV systolic function, Moderate-severe TR and PASP equals 65 consistent with PH.    RHC (1/2023):  RA 17 PA 58/27/41  PCWP  22 CO/CI 8.8/5.3  and PVR 2.16 POWERS       Home Meds:   Bumex 1mg BID    Hydralazine 100mg Q8H   Amlodipine 5mg daily

## 2023-08-24 NOTE — PROGRESS NOTE ADULT - PROBLEM SELECTOR PLAN 3
Anemia of chronic disease and thrombocytopenia. Iron studies reviewed, Tsat 25% Ferritin 753. Aranesp dosed 8/16 and 8/23. will dose Aranesp 8/31 if still inpt. Management as per primary team.     Final recommendations pending attending signature.    If you have any questions, please feel free to contact me  Chay Koehler  Nephrology Fellow  [employer]/Page [phone]  (After 5pm or on weekends please page the on-call fellow)

## 2023-08-24 NOTE — PROGRESS NOTE ADULT - SUBJECTIVE AND OBJECTIVE BOX
[facility] Division of Kidney Diseases & Hypertension  FOLLOW UP NOTE  [phone]--------------------------------------------------------------------------------  Chief Complaint:Heart failure      24 hour events/subjective: Pt seen and examined at bedside. Endorses persistent chest congestion. Denies fevers, HA, CP, SOB, n/v, edema.    PAST HISTORY  --------------------------------------------------------------------------------  No significant changes to PMH, PSH, FHx, SHx, unless otherwise noted    ALLERGIES & MEDICATIONS  --------------------------------------------------------------------------------  Allergies    No Known Allergies    Intolerances      Standing Inpatient Medications  atorvastatin 40 milliGRAM(s) Oral at bedtime  buMETAnide IVPB 3 milliGRAM(s) IV Intermittent two times a day  darbepoetin Injectable ViaL 60 MICROGram(s) SubCutaneous once  darbepoetin Injectable ViaL 60 MICROGram(s) SubCutaneous once  dextrose 5%. 1000 milliLiter(s) IV Continuous <Continuous>  dextrose 5%. 1000 milliLiter(s) IV Continuous <Continuous>  dextrose 50% Injectable 25 Gram(s) IV Push once  dextrose 50% Injectable 12.5 Gram(s) IV Push once  dextrose 50% Injectable 25 Gram(s) IV Push once  glucagon  Injectable 1 milliGRAM(s) IntraMuscular once  heparin   Injectable 5000 Unit(s) SubCutaneous every 12 hours  hydrALAZINE 100 milliGRAM(s) Oral every 8 hours  insulin lispro (ADMELOG) corrective regimen sliding scale   SubCutaneous three times a day before meals  insulin lispro (ADMELOG) corrective regimen sliding scale   SubCutaneous at bedtime  isosorbide   dinitrate Tablet (ISORDIL) 10 milliGRAM(s) Oral three times a day  levothyroxine 125 MICROGram(s) Oral daily  NIFEdipine XL 60 milliGRAM(s) Oral daily  pantoprazole    Tablet 40 milliGRAM(s) Oral before breakfast  sodium bicarbonate 1300 milliGRAM(s) Oral three times a day    PRN Inpatient Medications  dextrose Oral Gel 15 Gram(s) Oral once PRN  guaiFENesin Oral Liquid (Sugar-Free) 200 milliGRAM(s) Oral every 6 hours PRN      REVIEW OF SYSTEMS  --------------------------------------------------------------------------------    All other systems were reviewed and are negative, except as noted above    VITALS/PHYSICAL EXAM  --------------------------------------------------------------------------------  T(C): 36.3 (08-24-23 @ 05:40), Max: 36.6 (08-23-23 @ 13:15)  HR: 67 (08-24-23 @ 05:40) (66 - 70)  BP: 151/67 (08-24-23 @ 05:40) (139/61 - 151/67)  RR: 16 (08-24-23 @ 05:40) (16 - 18)  SpO2: 100% (08-24-23 @ 05:40) (98% - 100%)  Wt(kg): --    Physical Exam:  Gen: NAD  HEENT: Anicteric  Pulm: bibasilar crackles  CV: S1S2+  Abd: Soft, +BS    Ext: LE edema B/L  Neuro: Awake  Skin: Warm and dry    LABS/STUDIES  --------------------------------------------------------------------------------              7.5    8.02  >-----------<  148      [08-23-23 @ 06:33]              22.4     133  |  94  |  73  ----------------------------<  174      [08-23-23 @ 06:33]  4.8   |  25  |  2.93        Ca     9.3     [08-23-23 @ 06:33]      Mg     1.70     [08-23-23 @ 06:33]      Phos  4.8     [08-23-23 @ 06:33]    TPro  7.5  /  Alb  3.8  /  TBili  0.6  /  DBili  <0.2  /  AST  38  /  ALT  37  /  AlkPhos  439  [08-23-23 @ 06:33]    Creatinine Trend:  SCr 2.93 [08-23 @ 06:33]  SCr 3.02 [08-22 @ 05:05]  SCr 2.97 [08-21 @ 03:30]  SCr 2.84 [08-20 @ 06:02]  SCr 2.70 [08-19 @ 07:01]    Urinalysis - [08-23-23 @ 06:33]      Color  / Appearance  / SG  / pH       Gluc 174 / Ketone   / Bili  / Urobili        Blood  / Protein  / Leuk Est  / Nitrite       RBC  / WBC  / Hyaline  / Gran  / Sq Epi  / Non Sq Epi  / Bacteria     Urine Creatinine 27      [08-19-23 @ 12:10]  Urine Sodium 95      [08-19-23 @ 12:10]  Urine Urea Nitrogen 350.4      [08-19-23 @ 12:10]  Urine Osmolality 347      [08-19-23 @ 12:10]    STEVO: titer 1:160, pattern Speckled      [08-21-23 @ 03:30]

## 2023-08-25 LAB
ANION GAP SERPL CALC-SCNC: 13 MMOL/L — SIGNIFICANT CHANGE UP (ref 7–14)
BASOPHILS # BLD AUTO: 0.08 K/UL — SIGNIFICANT CHANGE UP (ref 0–0.2)
BASOPHILS NFR BLD AUTO: 0.9 % — SIGNIFICANT CHANGE UP (ref 0–2)
BLD GP AB SCN SERPL QL: NEGATIVE — SIGNIFICANT CHANGE UP
BUN SERPL-MCNC: 71 MG/DL — HIGH (ref 7–23)
CALCIUM SERPL-MCNC: 9.5 MG/DL — SIGNIFICANT CHANGE UP (ref 8.4–10.5)
CHLORIDE SERPL-SCNC: 92 MMOL/L — LOW (ref 98–107)
CO2 SERPL-SCNC: 27 MMOL/L — SIGNIFICANT CHANGE UP (ref 22–31)
CREAT SERPL-MCNC: 2.88 MG/DL — HIGH (ref 0.5–1.3)
EGFR: 18 ML/MIN/1.73M2 — LOW
EOSINOPHIL # BLD AUTO: 0.08 K/UL — SIGNIFICANT CHANGE UP (ref 0–0.5)
EOSINOPHIL NFR BLD AUTO: 0.9 % — SIGNIFICANT CHANGE UP (ref 0–6)
GIANT PLATELETS BLD QL SMEAR: PRESENT — SIGNIFICANT CHANGE UP
GLUCOSE BLDC GLUCOMTR-MCNC: 170 MG/DL — HIGH (ref 70–99)
GLUCOSE BLDC GLUCOMTR-MCNC: 184 MG/DL — HIGH (ref 70–99)
GLUCOSE BLDC GLUCOMTR-MCNC: 195 MG/DL — HIGH (ref 70–99)
GLUCOSE BLDC GLUCOMTR-MCNC: 318 MG/DL — HIGH (ref 70–99)
GLUCOSE SERPL-MCNC: 177 MG/DL — HIGH (ref 70–99)
HCT VFR BLD CALC: 22.7 % — LOW (ref 34.5–45)
HCT VFR BLD CALC: 23.3 % — LOW (ref 34.5–45)
HGB BLD-MCNC: 7.5 G/DL — LOW (ref 11.5–15.5)
HGB BLD-MCNC: 7.6 G/DL — LOW (ref 11.5–15.5)
IANC: 7.99 K/UL — HIGH (ref 1.8–7.4)
LYMPHOCYTES # BLD AUTO: 0.41 K/UL — LOW (ref 1–3.3)
LYMPHOCYTES # BLD AUTO: 4.4 % — LOW (ref 13–44)
MAGNESIUM SERPL-MCNC: 1.8 MG/DL — SIGNIFICANT CHANGE UP (ref 1.6–2.6)
MANUAL SMEAR VERIFICATION: SIGNIFICANT CHANGE UP
MCHC RBC-ENTMCNC: 26.1 PG — LOW (ref 27–34)
MCHC RBC-ENTMCNC: 26.6 PG — LOW (ref 27–34)
MCHC RBC-ENTMCNC: 32.6 GM/DL — SIGNIFICANT CHANGE UP (ref 32–36)
MCHC RBC-ENTMCNC: 33 GM/DL — SIGNIFICANT CHANGE UP (ref 32–36)
MCV RBC AUTO: 79.1 FL — LOW (ref 80–100)
MCV RBC AUTO: 81.5 FL — SIGNIFICANT CHANGE UP (ref 80–100)
MONOCYTES # BLD AUTO: 0.33 K/UL — SIGNIFICANT CHANGE UP (ref 0–0.9)
MONOCYTES NFR BLD AUTO: 3.5 % — SIGNIFICANT CHANGE UP (ref 2–14)
MYELOCYTES NFR BLD: 0.9 % — HIGH (ref 0–0)
NEUTROPHILS # BLD AUTO: 8.31 K/UL — HIGH (ref 1.8–7.4)
NEUTROPHILS NFR BLD AUTO: 89.4 % — HIGH (ref 43–77)
NRBC # BLD: 0 /100 WBCS — SIGNIFICANT CHANGE UP (ref 0–0)
NRBC # FLD: 0 K/UL — SIGNIFICANT CHANGE UP (ref 0–0)
PHOSPHATE SERPL-MCNC: 4.7 MG/DL — HIGH (ref 2.5–4.5)
PLAT MORPH BLD: NORMAL — SIGNIFICANT CHANGE UP
PLATELET # BLD AUTO: 141 K/UL — LOW (ref 150–400)
PLATELET # BLD AUTO: 144 K/UL — LOW (ref 150–400)
PLATELET COUNT - ESTIMATE: ABNORMAL
POTASSIUM SERPL-MCNC: 4.9 MMOL/L — SIGNIFICANT CHANGE UP (ref 3.5–5.3)
POTASSIUM SERPL-SCNC: 4.9 MMOL/L — SIGNIFICANT CHANGE UP (ref 3.5–5.3)
RBC # BLD: 2.86 M/UL — LOW (ref 3.8–5.2)
RBC # BLD: 2.87 M/UL — LOW (ref 3.8–5.2)
RBC # FLD: 14.1 % — SIGNIFICANT CHANGE UP (ref 10.3–14.5)
RBC # FLD: 14.1 % — SIGNIFICANT CHANGE UP (ref 10.3–14.5)
RBC BLD AUTO: NORMAL — SIGNIFICANT CHANGE UP
RH IG SCN BLD-IMP: POSITIVE — SIGNIFICANT CHANGE UP
SODIUM SERPL-SCNC: 132 MMOL/L — LOW (ref 135–145)
WBC # BLD: 7.71 K/UL — SIGNIFICANT CHANGE UP (ref 3.8–10.5)
WBC # BLD: 9.3 K/UL — SIGNIFICANT CHANGE UP (ref 3.8–10.5)
WBC # FLD AUTO: 7.71 K/UL — SIGNIFICANT CHANGE UP (ref 3.8–10.5)
WBC # FLD AUTO: 9.3 K/UL — SIGNIFICANT CHANGE UP (ref 3.8–10.5)

## 2023-08-25 PROCEDURE — 99232 SBSQ HOSP IP/OBS MODERATE 35: CPT | Mod: GC

## 2023-08-25 PROCEDURE — 99232 SBSQ HOSP IP/OBS MODERATE 35: CPT

## 2023-08-25 RX ORDER — ACETAMINOPHEN 500 MG
650 TABLET ORAL EVERY 6 HOURS
Refills: 0 | Status: DISCONTINUED | OUTPATIENT
Start: 2023-08-25 | End: 2023-08-30

## 2023-08-25 RX ORDER — SODIUM ZIRCONIUM CYCLOSILICATE 10 G/10G
5 POWDER, FOR SUSPENSION ORAL ONCE
Refills: 0 | Status: COMPLETED | OUTPATIENT
Start: 2023-08-25 | End: 2023-08-25

## 2023-08-25 RX ORDER — INSULIN LISPRO 100/ML
2 VIAL (ML) SUBCUTANEOUS
Refills: 0 | Status: DISCONTINUED | OUTPATIENT
Start: 2023-08-25 | End: 2023-08-30

## 2023-08-25 RX ORDER — BUMETANIDE 0.25 MG/ML
1 INJECTION INTRAMUSCULAR; INTRAVENOUS
Qty: 20 | Refills: 0 | Status: DISCONTINUED | OUTPATIENT
Start: 2023-08-25 | End: 2023-08-30

## 2023-08-25 RX ADMIN — Medication 1300 MILLIGRAM(S): at 21:29

## 2023-08-25 RX ADMIN — Medication 125 MICROGRAM(S): at 06:40

## 2023-08-25 RX ADMIN — Medication 100 MILLIGRAM(S): at 06:40

## 2023-08-25 RX ADMIN — BUMETANIDE 124 MILLIGRAM(S): 0.25 INJECTION INTRAMUSCULAR; INTRAVENOUS at 06:41

## 2023-08-25 RX ADMIN — ISOSORBIDE DINITRATE 10 MILLIGRAM(S): 5 TABLET ORAL at 13:46

## 2023-08-25 RX ADMIN — Medication 650 MILLIGRAM(S): at 01:28

## 2023-08-25 RX ADMIN — Medication 2 UNIT(S): at 18:17

## 2023-08-25 RX ADMIN — ATORVASTATIN CALCIUM 40 MILLIGRAM(S): 80 TABLET, FILM COATED ORAL at 21:29

## 2023-08-25 RX ADMIN — Medication 4: at 13:37

## 2023-08-25 RX ADMIN — Medication 1: at 18:18

## 2023-08-25 RX ADMIN — PANTOPRAZOLE SODIUM 40 MILLIGRAM(S): 20 TABLET, DELAYED RELEASE ORAL at 06:39

## 2023-08-25 RX ADMIN — Medication 1300 MILLIGRAM(S): at 06:40

## 2023-08-25 RX ADMIN — SODIUM ZIRCONIUM CYCLOSILICATE 5 GRAM(S): 10 POWDER, FOR SUSPENSION ORAL at 00:29

## 2023-08-25 RX ADMIN — HEPARIN SODIUM 5000 UNIT(S): 5000 INJECTION INTRAVENOUS; SUBCUTANEOUS at 17:12

## 2023-08-25 RX ADMIN — Medication 1: at 09:03

## 2023-08-25 RX ADMIN — Medication 100 MILLIGRAM(S): at 13:47

## 2023-08-25 RX ADMIN — Medication 1300 MILLIGRAM(S): at 13:46

## 2023-08-25 RX ADMIN — HEPARIN SODIUM 5000 UNIT(S): 5000 INJECTION INTRAVENOUS; SUBCUTANEOUS at 06:38

## 2023-08-25 RX ADMIN — BUMETANIDE 5 MG/HR: 0.25 INJECTION INTRAMUSCULAR; INTRAVENOUS at 17:30

## 2023-08-25 RX ADMIN — Medication 650 MILLIGRAM(S): at 00:28

## 2023-08-25 RX ADMIN — BUMETANIDE 124 MILLIGRAM(S): 0.25 INJECTION INTRAMUSCULAR; INTRAVENOUS at 14:58

## 2023-08-25 RX ADMIN — Medication 100 MILLIGRAM(S): at 21:29

## 2023-08-25 RX ADMIN — ISOSORBIDE DINITRATE 10 MILLIGRAM(S): 5 TABLET ORAL at 06:39

## 2023-08-25 RX ADMIN — Medication 60 MILLIGRAM(S): at 06:40

## 2023-08-25 RX ADMIN — ISOSORBIDE DINITRATE 10 MILLIGRAM(S): 5 TABLET ORAL at 21:29

## 2023-08-25 NOTE — PROGRESS NOTE ADULT - PROBLEM SELECTOR PLAN 3
Anemia of chronic disease and thrombocytopenia. Iron studies reviewed, Tsat 25% Ferritin 753. Aranesp dosed 8/16 and 8/23. will dose Aranesp 8/31 if still inpt. Management as per primary team.     Final recommendations pending attending signature.    If you have any questions, please feel free to contact me  Chay Koehler  Nephrology Fellow  [employer]/Page [phone]  (After 5pm or on weekends please page the on-call fellow)

## 2023-08-25 NOTE — PROGRESS NOTE ADULT - TIME BILLING
Time-based billing (NON-critical care).     35 minutes spent on total encounter; more than 50% of the visit was spent counseling and / or coordinating care by the attending physician.  The necessity of the time spent during the encounter on this date of service was due to:     review of laboratory data, radiology results, consultants' recommendations, documentation in [place], discussion with patient/ACP, cardiology team, and interdisciplinary staff (such as , social workers, etc). Interventions were performed as documented above.
coordination of care
Time-based billing (NON-critical care).     50 minutes spent on total encounter; more than 50% of the visit was spent counseling and / or coordinating care by the attending physician.  The necessity of the time spent during the encounter on this date of service was due to:     review of laboratory data, radiology results, consultants' recommendations, documentation in [place], discussion with patient/ACP and interdisciplinary staff (such as , social workers, etc). Interventions were performed as documented above.
Time-based billing (NON-critical care).     50 minutes spent on total encounter; more than 50% of the visit was spent counseling and / or coordinating care by the attending physician.  The necessity of the time spent during the encounter on this date of service was due to:     review of laboratory data, radiology results, consultants' recommendations, documentation in [place], discussion with patient/ACP and interdisciplinary staff (such as , social workers, etc). Interventions were performed as documented above.
Time-based billing (NON-critical care).     35 minutes spent on total encounter; more than 50% of the visit was spent counseling and / or coordinating care by the attending physician.  The necessity of the time spent during the encounter on this date of service was due to:     review of laboratory data, radiology results, consultants' recommendations, documentation in [place], discussion with patient/ACP and interdisciplinary staff (such as , social workers, etc). Interventions were performed as documented above.
Time-based billing (NON-critical care).     50 minutes spent on total encounter; more than 50% of the visit was spent counseling and / or coordinating care by the attending physician.  The necessity of the time spent during the encounter on this date of service was due to:     review of laboratory data, radiology results, consultants' recommendations, documentation in [place], discussion with patient/ACP, nephrology fellow, and interdisciplinary staff (such as , social workers, etc). Interventions were performed as documented above.
Time-based billing (NON-critical care).     35 minutes spent on total encounter; more than 50% of the visit was spent counseling and / or coordinating care by the attending physician.  The necessity of the time spent during the encounter on this date of service was due to:     review of laboratory data, radiology results, consultants' recommendations, documentation in [place], discussion with patient/ACP and interdisciplinary staff (such as , social workers, etc). Interventions were performed as documented above.
Time-based billing (NON-critical care).     50 minutes spent on total encounter; more than 50% of the visit was spent counseling and / or coordinating care by the attending physician.  The necessity of the time spent during the encounter on this date of service was due to:     review of laboratory data, radiology results, consultants' recommendations, documentation in [place], discussion with patient/ACP and interdisciplinary staff (such as , social workers, etc). Interventions were performed as documented above.
Time-based billing (NON-critical care).     35 minutes spent on total encounter; more than 50% of the visit was spent counseling and / or coordinating care by the attending physician.  The necessity of the time spent during the encounter on this date of service was due to:     review of laboratory data, radiology results, consultants' recommendations, documentation in [place], discussion with patient/ACP, nephrology fellow, and interdisciplinary staff (such as , social workers, etc). Interventions were performed as documented above.
Time-based billing (NON-critical care).     35 minutes spent on total encounter; more than 50% of the visit was spent counseling and / or coordinating care by the attending physician.  The necessity of the time spent during the encounter on this date of service was due to:     review of laboratory data, radiology results, consultants' recommendations, documentation in [place], discussion with patient/ACP and interdisciplinary staff (such as , social workers, etc). Interventions were performed as documented above.
Time-based billing (NON-critical care).     35 minutes spent on total encounter; more than 50% of the visit was spent counseling and / or coordinating care by the attending physician.  The necessity of the time spent during the encounter on this date of service was due to:     review of laboratory data, radiology results, consultants' recommendations, documentation in [place], discussion with patient/ACP and interdisciplinary staff (such as , social workers, etc). Interventions were performed as documented above.

## 2023-08-25 NOTE — PROGRESS NOTE ADULT - SUBJECTIVE AND OBJECTIVE BOX
[facility] Division of Kidney Diseases & Hypertension  FOLLOW UP NOTE  [phone]--------------------------------------------------------------------------------  Chief Complaint:Heart failure    24 hour events/subjective: Pt seen and examined at bedside this AM. Endorses persistent chest congestion and LE edema. Denies fevers, HA, CP, SOB, n/v.    PAST HISTORY  --------------------------------------------------------------------------------  No significant changes to PMH, PSH, FHx, SHx, unless otherwise noted    ALLERGIES & MEDICATIONS  --------------------------------------------------------------------------------  Allergies  No Known Allergies  Intolerances    Standing Inpatient Medications  atorvastatin 40 milliGRAM(s) Oral at bedtime  buMETAnide IVPB 3 milliGRAM(s) IV Intermittent two times a day  darbepoetin Injectable ViaL 60 MICROGram(s) SubCutaneous once  darbepoetin Injectable ViaL 60 MICROGram(s) SubCutaneous once  dextrose 5%. 1000 milliLiter(s) IV Continuous <Continuous>  dextrose 5%. 1000 milliLiter(s) IV Continuous <Continuous>  dextrose 50% Injectable 25 Gram(s) IV Push once  dextrose 50% Injectable 25 Gram(s) IV Push once  dextrose 50% Injectable 12.5 Gram(s) IV Push once  glucagon  Injectable 1 milliGRAM(s) IntraMuscular once  heparin   Injectable 5000 Unit(s) SubCutaneous every 12 hours  hydrALAZINE 100 milliGRAM(s) Oral every 8 hours  insulin lispro (ADMELOG) corrective regimen sliding scale   SubCutaneous three times a day before meals  insulin lispro (ADMELOG) corrective regimen sliding scale   SubCutaneous at bedtime  insulin lispro Injectable (ADMELOG) 2 Unit(s) SubCutaneous three times a day before meals  isosorbide   dinitrate Tablet (ISORDIL) 10 milliGRAM(s) Oral three times a day  levothyroxine 125 MICROGram(s) Oral daily  NIFEdipine XL 60 milliGRAM(s) Oral daily  pantoprazole    Tablet 40 milliGRAM(s) Oral before breakfast  sodium bicarbonate 1300 milliGRAM(s) Oral three times a day    PRN Inpatient Medications  acetaminophen     Tablet .. 650 milliGRAM(s) Oral every 6 hours PRN  dextrose Oral Gel 15 Gram(s) Oral once PRN  guaiFENesin Oral Liquid (Sugar-Free) 200 milliGRAM(s) Oral every 6 hours PRN    REVIEW OF SYSTEMS  --------------------------------------------------------------------------------    All other systems were reviewed and are negative, except as noted above    VITALS/PHYSICAL EXAM  --------------------------------------------------------------------------------  T(C): 36.3 (08-25-23 @ 13:40), Max: 36.3 (08-24-23 @ 17:20)  HR: 78 (08-25-23 @ 13:40) (66 - 78)  BP: 157/66 (08-25-23 @ 13:40) (131/62 - 157/66)  RR: 18 (08-25-23 @ 13:40) (16 - 18)  SpO2: 98% (08-25-23 @ 13:40) (95% - 100%)  Wt(kg): --        08-24-23 @ 07:01  -  08-25-23 @ 07:00  --------------------------------------------------------  IN: 360 mL / OUT: 0 mL / NET: 360 mL    08-25-23 @ 07:01  -  08-25-23 @ 14:14  --------------------------------------------------------  IN: 220 mL / OUT: 0 mL / NET: 220 mL      Physical Exam:  Gen: NAD  HEENT: Anicteric  Pulm: bibasilar crackles  CV: S1S2+  Abd: Soft, +BS    Ext: LE edema B/L  Neuro: Awake  Skin: Warm and dry    LABS/STUDIES  --------------------------------------------------------------------------------              7.5    7.71  >-----------<  144      [08-25-23 @ 06:46]              22.7     132  |  92  |  71  ----------------------------<  177      [08-25-23 @ 06:46]  4.9   |  27  |  2.88        Ca     9.5     [08-25-23 @ 06:46]      Mg     1.80     [08-25-23 @ 06:46]      Phos  4.7     [08-25-23 @ 06:46]    Creatinine Trend:  SCr 2.88 [08-25 @ 06:46]  SCr 2.95 [08-24 @ 22:47]  SCr 2.93 [08-23 @ 06:33]  SCr 3.02 [08-22 @ 05:05]  SCr 2.97 [08-21 @ 03:30]    Urinalysis - [08-25-23 @ 06:46]      Color  / Appearance  / SG  / pH       Gluc 177 / Ketone   / Bili  / Urobili        Blood  / Protein  / Leuk Est  / Nitrite       RBC  / WBC  / Hyaline  / Gran  / Sq Epi  / Non Sq Epi  / Bacteria     Urine Creatinine 27      [08-19-23 @ 12:10]  Urine Sodium 95      [08-19-23 @ 12:10]  Urine Urea Nitrogen 350.4      [08-19-23 @ 12:10]  Urine Osmolality 347      [08-19-23 @ 12:10]    STEVO: titer 1:160, pattern Speckled      [08-21-23 @ 03:30]  Free Light Chains: kappa 10.90, lambda 6.34, ratio = 1.72      [08-24 @ 06:20]

## 2023-08-25 NOTE — PROGRESS NOTE ADULT - SUBJECTIVE AND OBJECTIVE BOX
[facility] Division of Hospital Medicine  Richmond Saeed DO  Available via MS Teams  In house pager [phone]    SUBJECTIVE / OVERNIGHT EVENTS:  No acute events overnight.  Some ongoing left hand pain. Has kept hand in a warm compress - improving. Likely from infiltrated IV.     ADDITIONAL REVIEW OF SYSTEMS:    MEDICATIONS  (STANDING):  atorvastatin 40 milliGRAM(s) Oral at bedtime  buMETAnide IVPB 3 milliGRAM(s) IV Intermittent two times a day  darbepoetin Injectable ViaL 60 MICROGram(s) SubCutaneous once  darbepoetin Injectable ViaL 60 MICROGram(s) SubCutaneous once  dextrose 5%. 1000 milliLiter(s) (50 mL/Hr) IV Continuous <Continuous>  dextrose 5%. 1000 milliLiter(s) (100 mL/Hr) IV Continuous <Continuous>  dextrose 50% Injectable 25 Gram(s) IV Push once  dextrose 50% Injectable 25 Gram(s) IV Push once  dextrose 50% Injectable 12.5 Gram(s) IV Push once  glucagon  Injectable 1 milliGRAM(s) IntraMuscular once  heparin   Injectable 5000 Unit(s) SubCutaneous every 12 hours  hydrALAZINE 100 milliGRAM(s) Oral every 8 hours  insulin lispro (ADMELOG) corrective regimen sliding scale   SubCutaneous three times a day before meals  insulin lispro (ADMELOG) corrective regimen sliding scale   SubCutaneous at bedtime  isosorbide   dinitrate Tablet (ISORDIL) 10 milliGRAM(s) Oral three times a day  levothyroxine 125 MICROGram(s) Oral daily  NIFEdipine XL 60 milliGRAM(s) Oral daily  pantoprazole    Tablet 40 milliGRAM(s) Oral before breakfast  sodium bicarbonate 1300 milliGRAM(s) Oral three times a day    MEDICATIONS  (PRN):  acetaminophen     Tablet .. 650 milliGRAM(s) Oral every 6 hours PRN Mild Pain (1 - 3), Moderate Pain (4 - 6)  dextrose Oral Gel 15 Gram(s) Oral once PRN Blood Glucose LESS THAN 70 milliGRAM(s)/deciliter  guaiFENesin Oral Liquid (Sugar-Free) 200 milliGRAM(s) Oral every 6 hours PRN Cough      I&O's Summary    24 Aug 2023 07:01  -  25 Aug 2023 07:00  --------------------------------------------------------  IN: 360 mL / OUT: 0 mL / NET: 360 mL        PHYSICAL EXAM:  Vital Signs Last 24 Hrs  T(C): 36.3 (25 Aug 2023 06:40), Max: 36.3 (24 Aug 2023 17:20)  T(F): 97.3 (25 Aug 2023 06:40), Max: 97.4 (24 Aug 2023 23:20)  HR: 72 (25 Aug 2023 06:40) (66 - 72)  BP: 145/60 (25 Aug 2023 06:40) (130/60 - 145/60)  BP(mean): --  RR: 18 (25 Aug 2023 06:40) (16 - 18)  SpO2: 100% (25 Aug 2023 06:40) (95% - 100%)    Parameters below as of 25 Aug 2023 06:40  Patient On (Oxygen Delivery Method): room air      CONSTITUTIONAL: NAD, well-developed, well-groomed  EYES: PERRLA; conjunctiva and sclera clear  ENMT: Moist oral mucosa, no pharyngeal injection or exudates; normal dentition  NECK: Supple, no palpable masses; no thyromegaly  RESPIRATORY: Normal respiratory effort; lungs are clear to auscultation bilaterally  CARDIOVASCULAR: Regular rate and rhythm, normal S1 and S2, no murmur/rub/gallop; No lower extremity edema; Peripheral pulses are 2+ bilaterally  ABDOMEN: Nontender to palpation, normoactive bowel sounds, no rebound/guarding; No hepatosplenomegaly  MUSCULOSKELETAL: no clubbing or cyanosis of digits; left wrist swollen with mild limitation in finger flexion and extension  PSYCH: A+O to person, place, and time; affect appropriate  NEUROLOGY: CN 2-12 are intact and symmetric; no gross sensory deficits   SKIN: No rashes; no palpable lesions    LABS:                        7.5    7.71  )-----------( 144      ( 25 Aug 2023 06:46 )             22.7     08-25    132<L>  |  92<L>  |  71<H>  ----------------------------<  177<H>  4.9   |  27  |  2.88<H>    Ca    9.5      25 Aug 2023 06:46  Phos  4.7     08-25  Mg     1.80     08-25            Urinalysis Basic - ( 25 Aug 2023 06:46 )    Color: x / Appearance: x / SG: x / pH: x  Gluc: 177 mg/dL / Ketone: x  / Bili: x / Urobili: x   Blood: x / Protein: x / Nitrite: x   Leuk Esterase: x / RBC: x / WBC x   Sq Epi: x / Non Sq Epi: x / Bacteria: x        SARS-CoV-2: NotDetec (11 Aug 2023 21:46)  SARS-CoV-2: NotDetec (22 Jun 2023 05:10)

## 2023-08-25 NOTE — PROGRESS NOTE ADULT - PROBLEM SELECTOR PLAN 1
Pt with ISAMAR on CKD likely in setting of HF exacerbation and anemia. Per [facility], baseline Scr ~2, admitted with Scr of 1.62, now stable 2.88 (8/25)UA w/ 300 protein. UPCR 4. On 8/23 pt started on bumex 3mg BID IVPB. HF consult recs reviewed. Monitor BMP and strict I/Os. Dose meds per eGFR and avoid nephrotoxic agents.

## 2023-08-25 NOTE — PROGRESS NOTE ADULT - PROBLEM SELECTOR PLAN 1
#Acute hypoxic respiratory failure - resolved  BNP 6228  - TTE 8/6/23 with LVEF 69%, mod-sev TR, severe pulmonary HTN  - Weaned off BIPAP -> now on O2  - Given the discrepancy between LV function and class 2 pulm HTN, consulted cardio - appreciate input  - Strict I/O, daily weight. Pt has had multiple admissions for CHF, please document dry weight upon discharge  Repeat CXR 8/14 shows mild b/l pleural effusions.   She received additional IV bumex per nephrology recs while getting PRBC transfusion for anemia.  Weaned off O2   - complicated by worsening renal function  - now escalated to IV diuresis with bumetanide  - Planned for RHC 8/28    Appreciate ongoing nephrology, cardiology, heart failure input

## 2023-08-25 NOTE — PROGRESS NOTE ADULT - PROBLEM SELECTOR PLAN 2
- Right heart cath on 1/23/2023 demonstrates PVR normal, elevated PCWP, mPAP 41, s/o group 2 PHTN  - Pulm notes from January 2023 reviewed. No evidence of fibrosis on CT  - planned for RHC 8/28

## 2023-08-26 DIAGNOSIS — E87.1 HYPO-OSMOLALITY AND HYPONATREMIA: ICD-10-CM

## 2023-08-26 LAB
A1C WITH ESTIMATED AVERAGE GLUCOSE RESULT: 6 % — HIGH (ref 4–5.6)
ANION GAP SERPL CALC-SCNC: 15 MMOL/L — HIGH (ref 7–14)
BUN SERPL-MCNC: 68 MG/DL — HIGH (ref 7–23)
CALCIUM SERPL-MCNC: 9 MG/DL — SIGNIFICANT CHANGE UP (ref 8.4–10.5)
CHLORIDE SERPL-SCNC: 89 MMOL/L — LOW (ref 98–107)
CO2 SERPL-SCNC: 25 MMOL/L — SIGNIFICANT CHANGE UP (ref 22–31)
CREAT SERPL-MCNC: 2.97 MG/DL — HIGH (ref 0.5–1.3)
EGFR: 18 ML/MIN/1.73M2 — LOW
ESTIMATED AVERAGE GLUCOSE: 126 — SIGNIFICANT CHANGE UP
GLUCOSE BLDC GLUCOMTR-MCNC: 189 MG/DL — HIGH (ref 70–99)
GLUCOSE BLDC GLUCOMTR-MCNC: 215 MG/DL — HIGH (ref 70–99)
GLUCOSE BLDC GLUCOMTR-MCNC: 310 MG/DL — HIGH (ref 70–99)
GLUCOSE BLDC GLUCOMTR-MCNC: 313 MG/DL — HIGH (ref 70–99)
GLUCOSE SERPL-MCNC: 176 MG/DL — HIGH (ref 70–99)
HCT VFR BLD CALC: 20.5 % — CRITICAL LOW (ref 34.5–45)
HCT VFR BLD CALC: 22 % — LOW (ref 34.5–45)
HGB BLD-MCNC: 6.9 G/DL — CRITICAL LOW (ref 11.5–15.5)
HGB BLD-MCNC: 7.2 G/DL — LOW (ref 11.5–15.5)
MAGNESIUM SERPL-MCNC: 1.6 MG/DL — SIGNIFICANT CHANGE UP (ref 1.6–2.6)
MCHC RBC-ENTMCNC: 26.3 PG — LOW (ref 27–34)
MCHC RBC-ENTMCNC: 26.4 PG — LOW (ref 27–34)
MCHC RBC-ENTMCNC: 32.7 GM/DL — SIGNIFICANT CHANGE UP (ref 32–36)
MCHC RBC-ENTMCNC: 33.7 GM/DL — SIGNIFICANT CHANGE UP (ref 32–36)
MCV RBC AUTO: 78.5 FL — LOW (ref 80–100)
MCV RBC AUTO: 80.3 FL — SIGNIFICANT CHANGE UP (ref 80–100)
NRBC # BLD: 0 /100 WBCS — SIGNIFICANT CHANGE UP (ref 0–0)
NRBC # BLD: 0 /100 WBCS — SIGNIFICANT CHANGE UP (ref 0–0)
NRBC # FLD: 0 K/UL — SIGNIFICANT CHANGE UP (ref 0–0)
NRBC # FLD: 0 K/UL — SIGNIFICANT CHANGE UP (ref 0–0)
PHOSPHATE SERPL-MCNC: 4.5 MG/DL — SIGNIFICANT CHANGE UP (ref 2.5–4.5)
PLATELET # BLD AUTO: 143 K/UL — LOW (ref 150–400)
PLATELET # BLD AUTO: 147 K/UL — LOW (ref 150–400)
POTASSIUM SERPL-MCNC: 4.3 MMOL/L — SIGNIFICANT CHANGE UP (ref 3.5–5.3)
POTASSIUM SERPL-SCNC: 4.3 MMOL/L — SIGNIFICANT CHANGE UP (ref 3.5–5.3)
RBC # BLD: 2.61 M/UL — LOW (ref 3.8–5.2)
RBC # BLD: 2.74 M/UL — LOW (ref 3.8–5.2)
RBC # FLD: 14 % — SIGNIFICANT CHANGE UP (ref 10.3–14.5)
RBC # FLD: 14 % — SIGNIFICANT CHANGE UP (ref 10.3–14.5)
SODIUM SERPL-SCNC: 129 MMOL/L — LOW (ref 135–145)
WBC # BLD: 9.42 K/UL — SIGNIFICANT CHANGE UP (ref 3.8–10.5)
WBC # BLD: 9.8 K/UL — SIGNIFICANT CHANGE UP (ref 3.8–10.5)
WBC # FLD AUTO: 9.42 K/UL — SIGNIFICANT CHANGE UP (ref 3.8–10.5)
WBC # FLD AUTO: 9.8 K/UL — SIGNIFICANT CHANGE UP (ref 3.8–10.5)

## 2023-08-26 PROCEDURE — 99233 SBSQ HOSP IP/OBS HIGH 50: CPT

## 2023-08-26 PROCEDURE — 99232 SBSQ HOSP IP/OBS MODERATE 35: CPT | Mod: GC

## 2023-08-26 RX ADMIN — Medication 60 MILLIGRAM(S): at 05:24

## 2023-08-26 RX ADMIN — Medication 125 MICROGRAM(S): at 05:23

## 2023-08-26 RX ADMIN — Medication 100 MILLIGRAM(S): at 13:17

## 2023-08-26 RX ADMIN — Medication 1: at 08:53

## 2023-08-26 RX ADMIN — Medication 60 MICROGRAM(S): at 13:40

## 2023-08-26 RX ADMIN — HEPARIN SODIUM 5000 UNIT(S): 5000 INJECTION INTRAVENOUS; SUBCUTANEOUS at 05:25

## 2023-08-26 RX ADMIN — ATORVASTATIN CALCIUM 40 MILLIGRAM(S): 80 TABLET, FILM COATED ORAL at 21:39

## 2023-08-26 RX ADMIN — Medication 4: at 13:14

## 2023-08-26 RX ADMIN — Medication 100 MILLIGRAM(S): at 05:25

## 2023-08-26 RX ADMIN — Medication 200 MILLIGRAM(S): at 13:48

## 2023-08-26 RX ADMIN — ISOSORBIDE DINITRATE 10 MILLIGRAM(S): 5 TABLET ORAL at 05:24

## 2023-08-26 RX ADMIN — BUMETANIDE 5 MG/HR: 0.25 INJECTION INTRAMUSCULAR; INTRAVENOUS at 13:38

## 2023-08-26 RX ADMIN — Medication 4: at 18:04

## 2023-08-26 RX ADMIN — Medication 2 UNIT(S): at 13:14

## 2023-08-26 RX ADMIN — Medication 1300 MILLIGRAM(S): at 21:39

## 2023-08-26 RX ADMIN — Medication 2 UNIT(S): at 18:05

## 2023-08-26 RX ADMIN — Medication 1300 MILLIGRAM(S): at 13:17

## 2023-08-26 RX ADMIN — Medication 2 UNIT(S): at 08:54

## 2023-08-26 RX ADMIN — Medication 650 MILLIGRAM(S): at 13:48

## 2023-08-26 RX ADMIN — ISOSORBIDE DINITRATE 10 MILLIGRAM(S): 5 TABLET ORAL at 21:39

## 2023-08-26 RX ADMIN — Medication 1300 MILLIGRAM(S): at 05:24

## 2023-08-26 RX ADMIN — Medication 100 MILLIGRAM(S): at 21:39

## 2023-08-26 RX ADMIN — Medication 650 MILLIGRAM(S): at 14:38

## 2023-08-26 RX ADMIN — HEPARIN SODIUM 5000 UNIT(S): 5000 INJECTION INTRAVENOUS; SUBCUTANEOUS at 18:01

## 2023-08-26 RX ADMIN — PANTOPRAZOLE SODIUM 40 MILLIGRAM(S): 20 TABLET, DELAYED RELEASE ORAL at 05:24

## 2023-08-26 RX ADMIN — ISOSORBIDE DINITRATE 10 MILLIGRAM(S): 5 TABLET ORAL at 13:16

## 2023-08-26 NOTE — PROGRESS NOTE ADULT - PROBLEM SELECTOR PLAN 1
Pt with ISAMAR on CKD likely in setting of HF exacerbation and anemia. Per [facility], baseline Scr ~2, admitted with Scr of 1.62, today 2.97 (8/26). UA w/ 300 protein. UPCR 4. On 8/25 pt started on bumex infusion with plans for RHC. HF consult recs reviewed. Recommend IV hydration prior and post RHC. Monitor BMP and strict I/Os. Dose meds per eGFR and avoid nephrotoxic agents.

## 2023-08-26 NOTE — PROGRESS NOTE ADULT - PROBLEM SELECTOR PLAN 5
- Admission earlier this month for symptomatic anemia, given 1U PRBC  - Prior iron studies demonstrate elevated ferritin, consistent with anemia of chronic disease with suspected component of anemia of CKD  - Pt needs to follow up with GI per last admission  - Hgb stable on admission  s/p PRBC transfusion - Admission earlier this month for symptomatic anemia, given 1U PRBC  - Prior iron studies demonstrate elevated ferritin, consistent with anemia of chronic disease with suspected component of anemia of CKD  - Hgb downtrended to 6.9. No reports of active bleeding   - F/u with repeat CBC   - Transfuse if Hgb <7

## 2023-08-26 NOTE — PROGRESS NOTE ADULT - PROBLEM SELECTOR PLAN 2
Anemia of chronic disease and thrombocytopenia. Iron studies reviewed, Tsat 25% Ferritin 753. Aranesp dosed 8/16 and 8/23. will dose Aranesp 8/31 if still inpt. Management as per primary team.

## 2023-08-26 NOTE — PROGRESS NOTE ADULT - SUBJECTIVE AND OBJECTIVE BOX
Patient is a 58y old  Female who presents with a chief complaint of CHF Exacerbation (26 Aug 2023 09:41)      SUBJECTIVE / OVERNIGHT EVENTS:    MEDICATIONS  (STANDING):  atorvastatin 40 milliGRAM(s) Oral at bedtime  buMETAnide Infusion 1 mG/Hr (5 mL/Hr) IV Continuous <Continuous>  darbepoetin Injectable ViaL 60 MICROGram(s) SubCutaneous once  darbepoetin Injectable ViaL 60 MICROGram(s) SubCutaneous once  dextrose 5%. 1000 milliLiter(s) (50 mL/Hr) IV Continuous <Continuous>  dextrose 5%. 1000 milliLiter(s) (100 mL/Hr) IV Continuous <Continuous>  dextrose 50% Injectable 25 Gram(s) IV Push once  dextrose 50% Injectable 12.5 Gram(s) IV Push once  dextrose 50% Injectable 25 Gram(s) IV Push once  glucagon  Injectable 1 milliGRAM(s) IntraMuscular once  heparin   Injectable 5000 Unit(s) SubCutaneous every 12 hours  hydrALAZINE 100 milliGRAM(s) Oral every 8 hours  insulin lispro (ADMELOG) corrective regimen sliding scale   SubCutaneous three times a day before meals  insulin lispro (ADMELOG) corrective regimen sliding scale   SubCutaneous at bedtime  insulin lispro Injectable (ADMELOG) 2 Unit(s) SubCutaneous three times a day before meals  isosorbide   dinitrate Tablet (ISORDIL) 10 milliGRAM(s) Oral three times a day  levothyroxine 125 MICROGram(s) Oral daily  NIFEdipine XL 60 milliGRAM(s) Oral daily  pantoprazole    Tablet 40 milliGRAM(s) Oral before breakfast  sodium bicarbonate 1300 milliGRAM(s) Oral three times a day    MEDICATIONS  (PRN):  acetaminophen     Tablet .. 650 milliGRAM(s) Oral every 6 hours PRN Mild Pain (1 - 3), Moderate Pain (4 - 6)  dextrose Oral Gel 15 Gram(s) Oral once PRN Blood Glucose LESS THAN 70 milliGRAM(s)/deciliter  guaiFENesin Oral Liquid (Sugar-Free) 200 milliGRAM(s) Oral every 6 hours PRN Cough      T(C): 36.5 (08-26-23 @ 05:20), Max: 36.8 (08-25-23 @ 14:55)  HR: 80 (08-26-23 @ 05:20) (72 - 80)  BP: 145/63 (08-26-23 @ 05:20) (132/62 - 157/66)  RR: 18 (08-26-23 @ 05:20) (18 - 18)  SpO2: 94% (08-26-23 @ 05:20) (94% - 98%)  CAPILLARY BLOOD GLUCOSE      POCT Blood Glucose.: 189 mg/dL (26 Aug 2023 08:31)  POCT Blood Glucose.: 170 mg/dL (25 Aug 2023 21:28)  POCT Blood Glucose.: 195 mg/dL (25 Aug 2023 18:13)  POCT Blood Glucose.: 318 mg/dL (25 Aug 2023 13:34)    I&O's Summary    25 Aug 2023 07:01  -  26 Aug 2023 07:00  --------------------------------------------------------  IN: 620 mL / OUT: 0 mL / NET: 620 mL        PHYSICAL EXAM:  PHYSICAL EXAM:    Constitutional: NAD. well-developed; well-groomed; well-nourished;  HEENT: AT/NC, PERRLA; EOMI, MMM, no oropharyngeal lesions, no erythema, no exudates, Supple neck; normal thyroid gland, no cervical lymphadenopathy  Respiratory: CTAB. equal aeration bilaterally. no wheezing, no crackes, no rhonchi. no increase in WOB  Cardiovascular: RRR, no M/R/G. 2+ distal pulses. Cap refill < 2 seconds. no JVD  Gastrointestinal: soft; NT/ND, +BS, no rebounding tenderness / guarding / HSM / mass / ascites.  Genitourinary: not examined.  Extremities: no clubbing; no cyanosis; no pedal edema, non-tender to palpation, DP and Radial pulses intact.  Skin: warm and dry; color normal: no rash: no ulcers  Neurological: A&Ox 3; responds to pain; responds to verbal commands; epicritic and protopathic sensation intact: CN nerves grossly intact.   MSK/Back: no deformities. Active and passive ROM intact; strength intact, no CVA tenderness, No joint tenderness, swelling, erythema  Psychiatric: normal mood/affect. Denies SI/HI    LABS:                        6.9    9.42  )-----------( 143      ( 26 Aug 2023 08:22 )             20.5     08-26    129<L>  |  89<L>  |  68<H>  ----------------------------<  176<H>  4.3   |  25  |  2.97<H>    Ca    9.0      26 Aug 2023 08:22  Phos  4.5     08-26  Mg     1.60     08-26            Urinalysis Basic - ( 26 Aug 2023 08:22 )    Color: x / Appearance: x / SG: x / pH: x  Gluc: 176 mg/dL / Ketone: x  / Bili: x / Urobili: x   Blood: x / Protein: x / Nitrite: x   Leuk Esterase: x / RBC: x / WBC x   Sq Epi: x / Non Sq Epi: x / Bacteria: x        RADIOLOGY & ADDITIONAL TESTS:    Imaging Personally Reviewed: ANJELICA    Consultant(s) Notes Reviewed:  ANJELICA    Care Discussed with Consultants/Other Providers: ANJELICA   Patient is a 58y old  Female who presents with a chief complaint of CHF Exacerbation (26 Aug 2023 09:41)      SUBJECTIVE / OVERNIGHT EVENTS:  no acute events overnight.  Patient remains asymptomatic this AM. Reports ambulating to bathroom.  She frequently urinates with good amount per patient.  Denies CP, palpitation, visual disturbance, lightheadedness.    Tele: SR 80s.    MEDICATIONS  (STANDING):  atorvastatin 40 milliGRAM(s) Oral at bedtime  buMETAnide Infusion 1 mG/Hr (5 mL/Hr) IV Continuous <Continuous>  darbepoetin Injectable ViaL 60 MICROGram(s) SubCutaneous once  darbepoetin Injectable ViaL 60 MICROGram(s) SubCutaneous once  dextrose 5%. 1000 milliLiter(s) (50 mL/Hr) IV Continuous <Continuous>  dextrose 5%. 1000 milliLiter(s) (100 mL/Hr) IV Continuous <Continuous>  dextrose 50% Injectable 25 Gram(s) IV Push once  dextrose 50% Injectable 12.5 Gram(s) IV Push once  dextrose 50% Injectable 25 Gram(s) IV Push once  glucagon  Injectable 1 milliGRAM(s) IntraMuscular once  heparin   Injectable 5000 Unit(s) SubCutaneous every 12 hours  hydrALAZINE 100 milliGRAM(s) Oral every 8 hours  insulin lispro (ADMELOG) corrective regimen sliding scale   SubCutaneous three times a day before meals  insulin lispro (ADMELOG) corrective regimen sliding scale   SubCutaneous at bedtime  insulin lispro Injectable (ADMELOG) 2 Unit(s) SubCutaneous three times a day before meals  isosorbide   dinitrate Tablet (ISORDIL) 10 milliGRAM(s) Oral three times a day  levothyroxine 125 MICROGram(s) Oral daily  NIFEdipine XL 60 milliGRAM(s) Oral daily  pantoprazole    Tablet 40 milliGRAM(s) Oral before breakfast  sodium bicarbonate 1300 milliGRAM(s) Oral three times a day    MEDICATIONS  (PRN):  acetaminophen     Tablet .. 650 milliGRAM(s) Oral every 6 hours PRN Mild Pain (1 - 3), Moderate Pain (4 - 6)  dextrose Oral Gel 15 Gram(s) Oral once PRN Blood Glucose LESS THAN 70 milliGRAM(s)/deciliter  guaiFENesin Oral Liquid (Sugar-Free) 200 milliGRAM(s) Oral every 6 hours PRN Cough      T(C): 36.5 (08-26-23 @ 05:20), Max: 36.8 (08-25-23 @ 14:55)  HR: 80 (08-26-23 @ 05:20) (72 - 80)  BP: 145/63 (08-26-23 @ 05:20) (132/62 - 157/66)  RR: 18 (08-26-23 @ 05:20) (18 - 18)  SpO2: 94% (08-26-23 @ 05:20) (94% - 98%)  CAPILLARY BLOOD GLUCOSE      POCT Blood Glucose.: 189 mg/dL (26 Aug 2023 08:31)  POCT Blood Glucose.: 170 mg/dL (25 Aug 2023 21:28)  POCT Blood Glucose.: 195 mg/dL (25 Aug 2023 18:13)  POCT Blood Glucose.: 318 mg/dL (25 Aug 2023 13:34)    I&O's Summary    25 Aug 2023 07:01  -  26 Aug 2023 07:00  --------------------------------------------------------  IN: 620 mL / OUT: 0 mL / NET: 620 mL        PHYSICAL EXAM:  Constitutional: NAD.   HEENT: AT/NC, Supple neck;  Respiratory: no increase in WOB  Cardiovascular: RRR, S1, S2, no M/R/G. 2+ distal pulses. JVD +HJR, no LE edema.  Gastrointestinal: soft; NT/ND, +BS  Extremities: no cyanosis; non-tender to palpation, DP and Radial pulses intact.  Neurological: A&Ox 3;  Psychiatric: normal mood/affect.      LABS:                        6.9    9.42  )-----------( 143      ( 26 Aug 2023 08:22 )             20.5     08-26    129<L>  |  89<L>  |  68<H>  ----------------------------<  176<H>  4.3   |  25  |  2.97<H>    Ca    9.0      26 Aug 2023 08:22  Phos  4.5     08-26  Mg     1.60     08-26            Urinalysis Basic - ( 26 Aug 2023 08:22 )    Color: x / Appearance: x / SG: x / pH: x  Gluc: 176 mg/dL / Ketone: x  / Bili: x / Urobili: x   Blood: x / Protein: x / Nitrite: x   Leuk Esterase: x / RBC: x / WBC x   Sq Epi: x / Non Sq Epi: x / Bacteria: x        RADIOLOGY & ADDITIONAL TESTS:    Imaging Personally Reviewed: ANJELICA    Consultant(s) Notes Reviewed:  NA    Care Discussed with Consultants/Other Providers: ANJELICA

## 2023-08-26 NOTE — PROGRESS NOTE ADULT - PROBLEM SELECTOR PLAN 1
Patient remains symptom free. Hypervolemic on exam today given JVP elevation with HJR.  - c/w Bumex gtt   - c/w hydralazine 100mg TID (hold SBP<90)  - c/w ISDN 10mg TID (hold SBP<90)  - Strict I/O and Daily standing weights  - Monitor lytes replete K>4.0 and Mg>2.0   - Trend SCr  - Appreciate Nephrology recommendations  - if repeat CBC shows low Hgb and requires pRBC transfusion, recommend extra bolus of Bumex with slow infusion of pRBC.  - Plan for RHC on Monday. RHC does not involve contrast.  - Encouraged patient to consider CardioMEMs but family and patient reluctant

## 2023-08-26 NOTE — PROGRESS NOTE ADULT - PROBLEM SELECTOR PLAN 3
SNa 129. Obtain SOsm, Uosm and Alfie. Monitor labs closely    Final recommendations pending attending signature.    If you have any questions, please feel free to contact me  Chay Koehler  Nephrology Fellow  [employer]/Page [phone]  (After 5pm or on weekends please page the on-call fellow)

## 2023-08-26 NOTE — PROGRESS NOTE ADULT - SUBJECTIVE AND OBJECTIVE BOX
[facility] Division of Kidney Diseases & Hypertension  FOLLOW UP NOTE  [phone]--------------------------------------------------------------------------------  Chief Complaint:Heart failure    24 hour events/subjective: Pt seen and examined at bedside this AM. Started on bumex infusion and planned for RHC on monday. Reports feeling better.  Endorses persistent LE edema. Denies fevers, HA, CP, SOB, n/v.      PAST HISTORY  --------------------------------------------------------------------------------  No significant changes to PMH, PSH, FHx, SHx, unless otherwise noted    ALLERGIES & MEDICATIONS  --------------------------------------------------------------------------------  Allergies    No Known Allergies    Intolerances      Standing Inpatient Medications  atorvastatin 40 milliGRAM(s) Oral at bedtime  buMETAnide Infusion 1 mG/Hr IV Continuous <Continuous>  darbepoetin Injectable ViaL 60 MICROGram(s) SubCutaneous once  darbepoetin Injectable ViaL 60 MICROGram(s) SubCutaneous once  dextrose 5%. 1000 milliLiter(s) IV Continuous <Continuous>  dextrose 5%. 1000 milliLiter(s) IV Continuous <Continuous>  dextrose 50% Injectable 25 Gram(s) IV Push once  dextrose 50% Injectable 25 Gram(s) IV Push once  dextrose 50% Injectable 12.5 Gram(s) IV Push once  glucagon  Injectable 1 milliGRAM(s) IntraMuscular once  heparin   Injectable 5000 Unit(s) SubCutaneous every 12 hours  hydrALAZINE 100 milliGRAM(s) Oral every 8 hours  insulin lispro (ADMELOG) corrective regimen sliding scale   SubCutaneous three times a day before meals  insulin lispro (ADMELOG) corrective regimen sliding scale   SubCutaneous at bedtime  insulin lispro Injectable (ADMELOG) 2 Unit(s) SubCutaneous three times a day before meals  isosorbide   dinitrate Tablet (ISORDIL) 10 milliGRAM(s) Oral three times a day  levothyroxine 125 MICROGram(s) Oral daily  NIFEdipine XL 60 milliGRAM(s) Oral daily  pantoprazole    Tablet 40 milliGRAM(s) Oral before breakfast  sodium bicarbonate 1300 milliGRAM(s) Oral three times a day    PRN Inpatient Medications  acetaminophen     Tablet .. 650 milliGRAM(s) Oral every 6 hours PRN  dextrose Oral Gel 15 Gram(s) Oral once PRN  guaiFENesin Oral Liquid (Sugar-Free) 200 milliGRAM(s) Oral every 6 hours PRN      REVIEW OF SYSTEMS  --------------------------------------------------------------------------------    All other systems were reviewed and are negative, except as noted above    VITALS/PHYSICAL EXAM  --------------------------------------------------------------------------------  T(C): 36.5 (08-26-23 @ 05:20), Max: 36.8 (08-25-23 @ 14:55)  HR: 80 (08-26-23 @ 05:20) (72 - 80)  BP: 145/63 (08-26-23 @ 05:20) (132/62 - 157/66)  RR: 18 (08-26-23 @ 05:20) (18 - 18)  SpO2: 94% (08-26-23 @ 05:20) (94% - 98%)  Wt(kg): --    08-25-23 @ 07:01  -  08-26-23 @ 07:00  --------------------------------------------------------  IN: 620 mL / OUT: 0 mL / NET: 620 mL      Physical Exam:  Gen: NAD  HEENT: Anicteric  Pulm: bibasilar crackles  CV: S1S2+  Abd: Soft, +BS    Ext: LE edema B/L  Neuro: Awake  Skin: Warm and dry    LABS/STUDIES  --------------------------------------------------------------------------------              6.9    9.42  >-----------<  143      [08-26-23 @ 08:22]              20.5     129  |  89  |  68  ----------------------------<  176      [08-26-23 @ 08:22]  4.3   |  25  |  2.97        Ca     9.0     [08-26-23 @ 08:22]      Mg     1.60     [08-26-23 @ 08:22]      Phos  4.5     [08-26-23 @ 08:22]    Creatinine Trend:  SCr 2.97 [08-26 @ 08:22]  SCr 2.88 [08-25 @ 06:46]  SCr 2.95 [08-24 @ 22:47]  SCr 2.93 [08-23 @ 06:33]  SCr 3.02 [08-22 @ 05:05]    Urinalysis - [08-26-23 @ 08:22]      Color  / Appearance  / SG  / pH       Gluc 176 / Ketone   / Bili  / Urobili        Blood  / Protein  / Leuk Est  / Nitrite       RBC  / WBC  / Hyaline  / Gran  / Sq Epi  / Non Sq Epi  / Bacteria     Urine Creatinine 27      [08-19-23 @ 12:10]  Urine Sodium 95      [08-19-23 @ 12:10]  Urine Urea Nitrogen 350.4      [08-19-23 @ 12:10]  Urine Osmolality 347      [08-19-23 @ 12:10]        STEVO: titer 1:160, pattern Speckled      [08-21-23 @ 03:30]  Free Light Chains: kappa 10.90, lambda 6.34, ratio = 1.72      [08-24 @ 06:20]

## 2023-08-26 NOTE — PROGRESS NOTE ADULT - SUBJECTIVE AND OBJECTIVE BOX
Patient is a 58y old  Female who presents with a chief complaint of CHF Exacerbation (25 Aug 2023 14:14)      SUBJECTIVE / OVERNIGHT EVENTS:    Pt was started on bumex gtt. Pt is seen and examined at bedside. Denies any new concern.       MEDICATIONS  (STANDING):  atorvastatin 40 milliGRAM(s) Oral at bedtime  buMETAnide Infusion 1 mG/Hr (5 mL/Hr) IV Continuous <Continuous>  darbepoetin Injectable ViaL 60 MICROGram(s) SubCutaneous once  darbepoetin Injectable ViaL 60 MICROGram(s) SubCutaneous once  dextrose 5%. 1000 milliLiter(s) (50 mL/Hr) IV Continuous <Continuous>  dextrose 5%. 1000 milliLiter(s) (100 mL/Hr) IV Continuous <Continuous>  dextrose 50% Injectable 25 Gram(s) IV Push once  dextrose 50% Injectable 12.5 Gram(s) IV Push once  dextrose 50% Injectable 25 Gram(s) IV Push once  glucagon  Injectable 1 milliGRAM(s) IntraMuscular once  heparin   Injectable 5000 Unit(s) SubCutaneous every 12 hours  hydrALAZINE 100 milliGRAM(s) Oral every 8 hours  insulin lispro (ADMELOG) corrective regimen sliding scale   SubCutaneous three times a day before meals  insulin lispro (ADMELOG) corrective regimen sliding scale   SubCutaneous at bedtime  insulin lispro Injectable (ADMELOG) 2 Unit(s) SubCutaneous three times a day before meals  isosorbide   dinitrate Tablet (ISORDIL) 10 milliGRAM(s) Oral three times a day  levothyroxine 125 MICROGram(s) Oral daily  NIFEdipine XL 60 milliGRAM(s) Oral daily  pantoprazole    Tablet 40 milliGRAM(s) Oral before breakfast  sodium bicarbonate 1300 milliGRAM(s) Oral three times a day    MEDICATIONS  (PRN):  acetaminophen     Tablet .. 650 milliGRAM(s) Oral every 6 hours PRN Mild Pain (1 - 3), Moderate Pain (4 - 6)  dextrose Oral Gel 15 Gram(s) Oral once PRN Blood Glucose LESS THAN 70 milliGRAM(s)/deciliter  guaiFENesin Oral Liquid (Sugar-Free) 200 milliGRAM(s) Oral every 6 hours PRN Cough      Vital Signs Last 24 Hrs  T(C): 36.5 (26 Aug 2023 05:20), Max: 36.8 (25 Aug 2023 14:55)  T(F): 97.7 (26 Aug 2023 05:20), Max: 98.2 (25 Aug 2023 14:55)  HR: 80 (26 Aug 2023 05:20) (72 - 80)  BP: 145/63 (26 Aug 2023 05:20) (132/62 - 157/66)  BP(mean): --  RR: 18 (26 Aug 2023 05:20) (18 - 18)  SpO2: 94% (26 Aug 2023 05:20) (94% - 100%)    Parameters below as of 26 Aug 2023 05:20  Patient On (Oxygen Delivery Method): room air          PHYSICAL EXAM  GENERAL: NAD, well-developed  HEAD:  Atraumatic, Normocephalic  EYES: EOMI, PERRLA, conjunctiva and sclera clear  NECK: Supple, No JVD  CHEST/LUNG: Clear to auscultation bilaterally; No wheeze  HEART: Regular rate and rhythm; No murmurs, rubs, or gallops  ABDOMEN: Soft, Nontender, Nondistended; Bowel sounds present  EXTREMITIES:  2+ Peripheral Pulses, No clubbing, cyanosis, or edema  PSYCH: AAOx3  SKIN: No rashes or lesions    CAPILLARY BLOOD GLUCOSE      POCT Blood Glucose.: 170 mg/dL (25 Aug 2023 21:28)  POCT Blood Glucose.: 195 mg/dL (25 Aug 2023 18:13)  POCT Blood Glucose.: 318 mg/dL (25 Aug 2023 13:34)  POCT Blood Glucose.: 184 mg/dL (25 Aug 2023 09:01)    I&O's Summary    24 Aug 2023 07:01  -  25 Aug 2023 07:00  --------------------------------------------------------  IN: 360 mL / OUT: 0 mL / NET: 360 mL    25 Aug 2023 07:01  -  26 Aug 2023 06:37  --------------------------------------------------------  IN: 620 mL / OUT: 0 mL / NET: 620 mL        LABS:                        7.5    7.71  )-----------( 144      ( 25 Aug 2023 06:46 )             22.7     08-25    132<L>  |  92<L>  |  71<H>  ----------------------------<  177<H>  4.9   |  27  |  2.88<H>    Ca    9.5      25 Aug 2023 06:46  Phos  4.7     08-25  Mg     1.80     08-25            Urinalysis Basic - ( 25 Aug 2023 06:46 )    Color: x / Appearance: x / SG: x / pH: x  Gluc: 177 mg/dL / Ketone: x  / Bili: x / Urobili: x   Blood: x / Protein: x / Nitrite: x   Leuk Esterase: x / RBC: x / WBC x   Sq Epi: x / Non Sq Epi: x / Bacteria: x        RADIOLOGY & ADDITIONAL TESTS:     MICROBIOLOGY:    ANTIMICROBIALS:    CONSULTS: Patient is a 58y old  Female who presents with a chief complaint of CHF Exacerbation (25 Aug 2023 14:14)      SUBJECTIVE / OVERNIGHT EVENTS:    Pt was started on bumex gtt. Pt is seen and examined at bedside. Denies any new concern.       MEDICATIONS  (STANDING):  atorvastatin 40 milliGRAM(s) Oral at bedtime  buMETAnide Infusion 1 mG/Hr (5 mL/Hr) IV Continuous <Continuous>  darbepoetin Injectable ViaL 60 MICROGram(s) SubCutaneous once  darbepoetin Injectable ViaL 60 MICROGram(s) SubCutaneous once  dextrose 5%. 1000 milliLiter(s) (50 mL/Hr) IV Continuous <Continuous>  dextrose 5%. 1000 milliLiter(s) (100 mL/Hr) IV Continuous <Continuous>  dextrose 50% Injectable 25 Gram(s) IV Push once  dextrose 50% Injectable 12.5 Gram(s) IV Push once  dextrose 50% Injectable 25 Gram(s) IV Push once  glucagon  Injectable 1 milliGRAM(s) IntraMuscular once  heparin   Injectable 5000 Unit(s) SubCutaneous every 12 hours  hydrALAZINE 100 milliGRAM(s) Oral every 8 hours  insulin lispro (ADMELOG) corrective regimen sliding scale   SubCutaneous three times a day before meals  insulin lispro (ADMELOG) corrective regimen sliding scale   SubCutaneous at bedtime  insulin lispro Injectable (ADMELOG) 2 Unit(s) SubCutaneous three times a day before meals  isosorbide   dinitrate Tablet (ISORDIL) 10 milliGRAM(s) Oral three times a day  levothyroxine 125 MICROGram(s) Oral daily  NIFEdipine XL 60 milliGRAM(s) Oral daily  pantoprazole    Tablet 40 milliGRAM(s) Oral before breakfast  sodium bicarbonate 1300 milliGRAM(s) Oral three times a day    MEDICATIONS  (PRN):  acetaminophen     Tablet .. 650 milliGRAM(s) Oral every 6 hours PRN Mild Pain (1 - 3), Moderate Pain (4 - 6)  dextrose Oral Gel 15 Gram(s) Oral once PRN Blood Glucose LESS THAN 70 milliGRAM(s)/deciliter  guaiFENesin Oral Liquid (Sugar-Free) 200 milliGRAM(s) Oral every 6 hours PRN Cough      Vital Signs Last 24 Hrs  T(C): 36.5 (26 Aug 2023 05:20), Max: 36.8 (25 Aug 2023 14:55)  T(F): 97.7 (26 Aug 2023 05:20), Max: 98.2 (25 Aug 2023 14:55)  HR: 80 (26 Aug 2023 05:20) (72 - 80)  BP: 145/63 (26 Aug 2023 05:20) (132/62 - 157/66)  BP(mean): --  RR: 18 (26 Aug 2023 05:20) (18 - 18)  SpO2: 94% (26 Aug 2023 05:20) (94% - 100%)    Parameters below as of 26 Aug 2023 05:20  Patient On (Oxygen Delivery Method): room air          PHYSICAL EXAM  GENERAL: NAD, well-developed  HEAD:  Atraumatic, Normocephalic  EYES: EOMI, PERRLA, conjunctiva and sclera clear  NECK: Supple, No JVD  CHEST/LUNG: Clear to auscultation bilaterally; No wheeze  HEART: systolic murmurs, no gallops   ABDOMEN: Soft, Nontender, Nondistended; Bowel sounds present  EXTREMITIES:  2+ Peripheral Pulses, 1+ pitting edema in b/l LE   PSYCH: AAOx3  SKIN: No rashes or lesions    CAPILLARY BLOOD GLUCOSE      POCT Blood Glucose.: 170 mg/dL (25 Aug 2023 21:28)  POCT Blood Glucose.: 195 mg/dL (25 Aug 2023 18:13)  POCT Blood Glucose.: 318 mg/dL (25 Aug 2023 13:34)  POCT Blood Glucose.: 184 mg/dL (25 Aug 2023 09:01)    I&O's Summary    24 Aug 2023 07:01  -  25 Aug 2023 07:00  --------------------------------------------------------  IN: 360 mL / OUT: 0 mL / NET: 360 mL    25 Aug 2023 07:01  -  26 Aug 2023 06:37  --------------------------------------------------------  IN: 620 mL / OUT: 0 mL / NET: 620 mL        LABS:                        7.5    7.71  )-----------( 144      ( 25 Aug 2023 06:46 )             22.7     08-25    132<L>  |  92<L>  |  71<H>  ----------------------------<  177<H>  4.9   |  27  |  2.88<H>    Ca    9.5      25 Aug 2023 06:46  Phos  4.7     08-25  Mg     1.80     08-25            Urinalysis Basic - ( 25 Aug 2023 06:46 )    Color: x / Appearance: x / SG: x / pH: x  Gluc: 177 mg/dL / Ketone: x  / Bili: x / Urobili: x   Blood: x / Protein: x / Nitrite: x   Leuk Esterase: x / RBC: x / WBC x   Sq Epi: x / Non Sq Epi: x / Bacteria: x

## 2023-08-27 DIAGNOSIS — E87.1 HYPO-OSMOLALITY AND HYPONATREMIA: ICD-10-CM

## 2023-08-27 LAB
ANION GAP SERPL CALC-SCNC: 12 MMOL/L — SIGNIFICANT CHANGE UP (ref 7–14)
ANION GAP SERPL CALC-SCNC: 16 MMOL/L — HIGH (ref 7–14)
BLD GP AB SCN SERPL QL: NEGATIVE — SIGNIFICANT CHANGE UP
BUN SERPL-MCNC: 69 MG/DL — HIGH (ref 7–23)
BUN SERPL-MCNC: 69 MG/DL — HIGH (ref 7–23)
CALCIUM SERPL-MCNC: 8.9 MG/DL — SIGNIFICANT CHANGE UP (ref 8.4–10.5)
CALCIUM SERPL-MCNC: 9.4 MG/DL — SIGNIFICANT CHANGE UP (ref 8.4–10.5)
CHLORIDE SERPL-SCNC: 86 MMOL/L — LOW (ref 98–107)
CHLORIDE SERPL-SCNC: 86 MMOL/L — LOW (ref 98–107)
CO2 SERPL-SCNC: 25 MMOL/L — SIGNIFICANT CHANGE UP (ref 22–31)
CO2 SERPL-SCNC: 29 MMOL/L — SIGNIFICANT CHANGE UP (ref 22–31)
CREAT ?TM UR-MCNC: 15 MG/DL — SIGNIFICANT CHANGE UP
CREAT SERPL-MCNC: 3.08 MG/DL — HIGH (ref 0.5–1.3)
CREAT SERPL-MCNC: 3.25 MG/DL — HIGH (ref 0.5–1.3)
EGFR: 16 ML/MIN/1.73M2 — LOW
EGFR: 17 ML/MIN/1.73M2 — LOW
GLUCOSE BLDC GLUCOMTR-MCNC: 131 MG/DL — HIGH (ref 70–99)
GLUCOSE BLDC GLUCOMTR-MCNC: 185 MG/DL — HIGH (ref 70–99)
GLUCOSE BLDC GLUCOMTR-MCNC: 273 MG/DL — HIGH (ref 70–99)
GLUCOSE BLDC GLUCOMTR-MCNC: 298 MG/DL — HIGH (ref 70–99)
GLUCOSE SERPL-MCNC: 112 MG/DL — HIGH (ref 70–99)
GLUCOSE SERPL-MCNC: 168 MG/DL — HIGH (ref 70–99)
HCT VFR BLD CALC: 22.9 % — LOW (ref 34.5–45)
HGB BLD-MCNC: 7.4 G/DL — LOW (ref 11.5–15.5)
MAGNESIUM SERPL-MCNC: 1.6 MG/DL — SIGNIFICANT CHANGE UP (ref 1.6–2.6)
MAGNESIUM SERPL-MCNC: 1.7 MG/DL — SIGNIFICANT CHANGE UP (ref 1.6–2.6)
MCHC RBC-ENTMCNC: 25.8 PG — LOW (ref 27–34)
MCHC RBC-ENTMCNC: 32.3 GM/DL — SIGNIFICANT CHANGE UP (ref 32–36)
MCV RBC AUTO: 79.8 FL — LOW (ref 80–100)
NRBC # BLD: 0 /100 WBCS — SIGNIFICANT CHANGE UP (ref 0–0)
NRBC # FLD: 0 K/UL — SIGNIFICANT CHANGE UP (ref 0–0)
OSMOLALITY SERPL: 293 MOSM/KG — SIGNIFICANT CHANGE UP (ref 275–295)
OSMOLALITY UR: 287 MOSM/KG — SIGNIFICANT CHANGE UP (ref 50–1200)
PHOSPHATE SERPL-MCNC: 4.6 MG/DL — HIGH (ref 2.5–4.5)
PHOSPHATE SERPL-MCNC: 5 MG/DL — HIGH (ref 2.5–4.5)
PLATELET # BLD AUTO: 157 K/UL — SIGNIFICANT CHANGE UP (ref 150–400)
POTASSIUM SERPL-MCNC: 4.5 MMOL/L — SIGNIFICANT CHANGE UP (ref 3.5–5.3)
POTASSIUM SERPL-MCNC: 4.8 MMOL/L — SIGNIFICANT CHANGE UP (ref 3.5–5.3)
POTASSIUM SERPL-SCNC: 4.5 MMOL/L — SIGNIFICANT CHANGE UP (ref 3.5–5.3)
POTASSIUM SERPL-SCNC: 4.8 MMOL/L — SIGNIFICANT CHANGE UP (ref 3.5–5.3)
PROT ?TM UR-MCNC: 54 MG/DL — SIGNIFICANT CHANGE UP
PROT/CREAT UR-RTO: 3.6 RATIO — HIGH (ref 0–0.2)
RBC # BLD: 2.87 M/UL — LOW (ref 3.8–5.2)
RBC # FLD: 14 % — SIGNIFICANT CHANGE UP (ref 10.3–14.5)
RH IG SCN BLD-IMP: POSITIVE — SIGNIFICANT CHANGE UP
SODIUM SERPL-SCNC: 127 MMOL/L — LOW (ref 135–145)
SODIUM SERPL-SCNC: 127 MMOL/L — LOW (ref 135–145)
SODIUM UR-SCNC: 101 MMOL/L — SIGNIFICANT CHANGE UP
WBC # BLD: 7.85 K/UL — SIGNIFICANT CHANGE UP (ref 3.8–10.5)
WBC # FLD AUTO: 7.85 K/UL — SIGNIFICANT CHANGE UP (ref 3.8–10.5)

## 2023-08-27 PROCEDURE — 99233 SBSQ HOSP IP/OBS HIGH 50: CPT

## 2023-08-27 PROCEDURE — 99232 SBSQ HOSP IP/OBS MODERATE 35: CPT | Mod: GC

## 2023-08-27 RX ORDER — OXYCODONE HYDROCHLORIDE 5 MG/1
5 TABLET ORAL ONCE
Refills: 0 | Status: DISCONTINUED | OUTPATIENT
Start: 2023-08-27 | End: 2023-08-30

## 2023-08-27 RX ADMIN — Medication 60 MILLIGRAM(S): at 05:07

## 2023-08-27 RX ADMIN — ISOSORBIDE DINITRATE 10 MILLIGRAM(S): 5 TABLET ORAL at 21:20

## 2023-08-27 RX ADMIN — Medication 125 MICROGRAM(S): at 05:07

## 2023-08-27 RX ADMIN — Medication 3: at 12:34

## 2023-08-27 RX ADMIN — Medication 200 MILLIGRAM(S): at 09:05

## 2023-08-27 RX ADMIN — Medication 650 MILLIGRAM(S): at 09:05

## 2023-08-27 RX ADMIN — Medication 2 UNIT(S): at 18:32

## 2023-08-27 RX ADMIN — ISOSORBIDE DINITRATE 10 MILLIGRAM(S): 5 TABLET ORAL at 05:07

## 2023-08-27 RX ADMIN — PANTOPRAZOLE SODIUM 40 MILLIGRAM(S): 20 TABLET, DELAYED RELEASE ORAL at 05:07

## 2023-08-27 RX ADMIN — BUMETANIDE 5 MG/HR: 0.25 INJECTION INTRAMUSCULAR; INTRAVENOUS at 08:07

## 2023-08-27 RX ADMIN — Medication 100 MILLIGRAM(S): at 13:09

## 2023-08-27 RX ADMIN — Medication 1: at 17:45

## 2023-08-27 RX ADMIN — Medication 100 MILLIGRAM(S): at 05:08

## 2023-08-27 RX ADMIN — HEPARIN SODIUM 5000 UNIT(S): 5000 INJECTION INTRAVENOUS; SUBCUTANEOUS at 17:48

## 2023-08-27 RX ADMIN — HEPARIN SODIUM 5000 UNIT(S): 5000 INJECTION INTRAVENOUS; SUBCUTANEOUS at 05:09

## 2023-08-27 RX ADMIN — Medication 650 MILLIGRAM(S): at 10:05

## 2023-08-27 RX ADMIN — Medication 2 UNIT(S): at 12:34

## 2023-08-27 RX ADMIN — ISOSORBIDE DINITRATE 10 MILLIGRAM(S): 5 TABLET ORAL at 13:10

## 2023-08-27 RX ADMIN — Medication 1: at 22:25

## 2023-08-27 RX ADMIN — Medication 100 MILLIGRAM(S): at 21:20

## 2023-08-27 RX ADMIN — ATORVASTATIN CALCIUM 40 MILLIGRAM(S): 80 TABLET, FILM COATED ORAL at 21:20

## 2023-08-27 RX ADMIN — Medication 1300 MILLIGRAM(S): at 05:06

## 2023-08-27 RX ADMIN — Medication 2 UNIT(S): at 08:56

## 2023-08-27 NOTE — PROGRESS NOTE ADULT - PROBLEM SELECTOR PLAN 6
- Admission earlier this month for symptomatic anemia, given 1U PRBC  - Prior iron studies demonstrate elevated ferritin, consistent with anemia of chronic disease with suspected component of anemia of CKD  - Hgb downtrended to 6.9. No reports of active bleeding   - F/u with repeat CBC   - Transfuse if Hgb <7

## 2023-08-27 NOTE — PROVIDER CONTACT NOTE (OTHER) - DATE AND TIME:
21-Aug-2023 22:00
27-Aug-2023 19:54
20-Aug-2023 21:35
20-Aug-2023 22:00
12-Aug-2023 22:22
23-Aug-2023 21:30

## 2023-08-27 NOTE — PROGRESS NOTE ADULT - PROBLEM SELECTOR PLAN 3
SNa 127. Obtain SOsm, Uosm and Alfie. High protein diet. Monitor labs closely    Final recommendations pending attending signature.    If you have any questions, please feel free to contact me  Chay Koehler  Nephrology Fellow  [employer]/Page [phone]  (After 5pm or on weekends please page the on-call fellow) SNa 127. Patients daily weight is increasing and is still volume overloaded. Obtain SOsm, Uosm and Alfie. High protein diet. Monitor labs closely    Final recommendations pending attending signature.    If you have any questions, please feel free to contact me  Chay Koehler  Nephrology Fellow  [email or web address]/Page [phone]  (After 5pm or on weekends please page the on-call fellow)

## 2023-08-27 NOTE — PROGRESS NOTE ADULT - SUBJECTIVE AND OBJECTIVE BOX
Patient is a 58y old  Female who presents with a chief complaint of CHF Exacerbation (27 Aug 2023 07:50)      SUBJECTIVE / OVERNIGHT EVENTS:    No acute events overnight. Pt is seen and examined at bedside. Reports to have chronic b/l LE edema which is improving. Denies any chest pain or SOB.       MEDICATIONS  (STANDING):  atorvastatin 40 milliGRAM(s) Oral at bedtime  buMETAnide Infusion 1 mG/Hr (5 mL/Hr) IV Continuous <Continuous>  darbepoetin Injectable ViaL 60 MICROGram(s) SubCutaneous once  dextrose 5%. 1000 milliLiter(s) (100 mL/Hr) IV Continuous <Continuous>  dextrose 5%. 1000 milliLiter(s) (50 mL/Hr) IV Continuous <Continuous>  dextrose 50% Injectable 25 Gram(s) IV Push once  dextrose 50% Injectable 12.5 Gram(s) IV Push once  dextrose 50% Injectable 25 Gram(s) IV Push once  glucagon  Injectable 1 milliGRAM(s) IntraMuscular once  heparin   Injectable 5000 Unit(s) SubCutaneous every 12 hours  hydrALAZINE 100 milliGRAM(s) Oral every 8 hours  insulin lispro (ADMELOG) corrective regimen sliding scale   SubCutaneous three times a day before meals  insulin lispro (ADMELOG) corrective regimen sliding scale   SubCutaneous at bedtime  insulin lispro Injectable (ADMELOG) 2 Unit(s) SubCutaneous three times a day before meals  isosorbide   dinitrate Tablet (ISORDIL) 10 milliGRAM(s) Oral three times a day  levothyroxine 125 MICROGram(s) Oral daily  NIFEdipine XL 60 milliGRAM(s) Oral daily  pantoprazole    Tablet 40 milliGRAM(s) Oral before breakfast    MEDICATIONS  (PRN):  acetaminophen     Tablet .. 650 milliGRAM(s) Oral every 6 hours PRN Mild Pain (1 - 3), Moderate Pain (4 - 6)  dextrose Oral Gel 15 Gram(s) Oral once PRN Blood Glucose LESS THAN 70 milliGRAM(s)/deciliter  guaiFENesin Oral Liquid (Sugar-Free) 200 milliGRAM(s) Oral every 6 hours PRN Cough      Vital Signs Last 24 Hrs  T(C): 36.4 (27 Aug 2023 05:05), Max: 36.7 (26 Aug 2023 18:51)  T(F): 97.5 (27 Aug 2023 05:05), Max: 98 (26 Aug 2023 18:51)  HR: 73 (27 Aug 2023 05:05) (66 - 73)  BP: 143/63 (27 Aug 2023 05:05) (117/52 - 143/63)  BP(mean): --  RR: 18 (27 Aug 2023 05:05) (17 - 18)  SpO2: 98% (27 Aug 2023 05:05) (95% - 98%)    Parameters below as of 27 Aug 2023 05:05  Patient On (Oxygen Delivery Method): room air          PHYSICAL EXAM  GENERAL: NAD, well-developed  HEAD:  Atraumatic, Normocephalic  EYES: EOMI, PERRLA, conjunctiva and sclera clear  NECK: Supple, No JVD  CHEST/LUNG: Clear to auscultation bilaterally; No wheeze  HEART: Regular rate and rhythm; No murmurs, rubs, or gallops  ABDOMEN: Soft, Nontender, Nondistended; Bowel sounds present  EXTREMITIES:  2+ Peripheral Pulses, No clubbing, cyanosis, or edema  PSYCH: AAOx3  SKIN: No rashes or lesions    CAPILLARY BLOOD GLUCOSE      POCT Blood Glucose.: 131 mg/dL (27 Aug 2023 08:55)  POCT Blood Glucose.: 215 mg/dL (26 Aug 2023 21:36)  POCT Blood Glucose.: 310 mg/dL (26 Aug 2023 17:48)  POCT Blood Glucose.: 313 mg/dL (26 Aug 2023 12:30)    I&O's Summary    26 Aug 2023 07:01  -  27 Aug 2023 07:00  --------------------------------------------------------  IN: 400 mL / OUT: 2600 mL / NET: -2200 mL        LABS:                        7.4    7.85  )-----------( 157      ( 27 Aug 2023 06:29 )             22.9     08-27    127<L>  |  86<L>  |  69<H>  ----------------------------<  112<H>  4.5   |  29  |  3.08<H>    Ca    9.4      27 Aug 2023 06:29  Phos  5.0     08-27  Mg     1.60     08-27            Urinalysis Basic - ( 27 Aug 2023 06:29 )    Color: x / Appearance: x / SG: x / pH: x  Gluc: 112 mg/dL / Ketone: x  / Bili: x / Urobili: x   Blood: x / Protein: x / Nitrite: x   Leuk Esterase: x / RBC: x / WBC x   Sq Epi: x / Non Sq Epi: x / Bacteria: x        RADIOLOGY & ADDITIONAL TESTS:     MICROBIOLOGY:    ANTIMICROBIALS:    CONSULTS:

## 2023-08-27 NOTE — PROVIDER CONTACT NOTE (OTHER) - RECOMMENDATIONS
ACP aware
ACP aware - 1U to be ordered
Provider made aware, education provided.
ACP made aware.
provider notified.

## 2023-08-27 NOTE — PROVIDER CONTACT NOTE (OTHER) - SITUATION
Pt refusing 1U Insulin () despite education.
Pt refusing 1U Insulin () despite education.
BMP sent CR level elevated. pt on bumex running at 5ml/hr
patient c/o pain and swelling to left hand where IV was
pt refusing sliding scale order of 2U for Finger Stick of 333. pt only willing to receive 1U of insulin. education was provided of the importance of the sliding scale and order dose.
pt /50 HR 71 pt asymptomatic

## 2023-08-27 NOTE — PROGRESS NOTE ADULT - PROBLEM SELECTOR PLAN 1
Pt with ISAMAR on CKD likely in setting of HF exacerbation and anemia. Per [facility], baseline Scr ~2, admitted with Scr of 1.62, today 3.08 (8/27). UA w/ 300 protein. UPCR 4. Serum bicarb 29 today. Stop sodium bicarb tabs, serum bicarb goal 22-24. On 8/25 pt started on bumex infusion with plans for RHC. HF consult recs reviewed. Monitor BMP and strict I/Os. Dose meds per eGFR and avoid nephrotoxic agents.

## 2023-08-27 NOTE — CHART NOTE - NSCHARTNOTEFT_GEN_A_CORE
Pt is 58 yr old female with ISAMAR on CKD likely in setting of HF exacerbation and anemia currently on iv bumex infusion. Signed out by day team to f/u with renal fellow with evening BMP due to elevated creatinine on bumex drip. Discussed the BMP results with nephrology fellow on call Dr Koehler. Recommended to continue bumex drip at the current rate which is 5 ml/hr (1mg/hr). VSS. Plan updated with the night primary RN

## 2023-08-27 NOTE — PROVIDER CONTACT NOTE (OTHER) - ASSESSMENT
IV was previously removed
BMP sent CR level elevated. pt on bumex running at 5ml/hr
pt refusing sliding scale order of 2U for Finger Stick of 333. pt only willing to receive 1U of insulin. education was provided of the importance of the sliding scale and order dose.
Pt AOX4, VSS, no c/o of distress, pain, chest pain. Pt refusing Insulin despite , claiming this happens at home and FS resolves by AM.
Pt refusing Insulin .   Pt states this happens at home and FS resolves by AM.
pt /50 HR 71 pt asymptomatic

## 2023-08-27 NOTE — PROVIDER CONTACT NOTE (OTHER) - REASON
BMP CR level
pt /50 HR 71
Refusing Insulin
patient c/o pain and swelling to left hand
pt refusing sliding scale order of 2U for Finger Stick of 333
Refusing Insulin

## 2023-08-27 NOTE — PROGRESS NOTE ADULT - SUBJECTIVE AND OBJECTIVE BOX
[facility] Division of Kidney Diseases & Hypertension  FOLLOW UP NOTE  [phone]--------------------------------------------------------------------------------  Chief Complaint:Heart failure    24 hour events/subjective: Pt seen and examined at bedside this AM. on bumex infusion since 8/25 and planned for RHC on monday. Reports feeling better. Denies fevers, HA, CP, SOB, n/v.    PAST HISTORY  --------------------------------------------------------------------------------  No significant changes to PMH, PSH, FHx, SHx, unless otherwise noted    ALLERGIES & MEDICATIONS  --------------------------------------------------------------------------------  Allergies  No Known Allergies  Intolerances    Standing Inpatient Medications  atorvastatin 40 milliGRAM(s) Oral at bedtime  buMETAnide Infusion 1 mG/Hr IV Continuous <Continuous>  darbepoetin Injectable ViaL 60 MICROGram(s) SubCutaneous once  dextrose 5%. 1000 milliLiter(s) IV Continuous <Continuous>  dextrose 5%. 1000 milliLiter(s) IV Continuous <Continuous>  dextrose 50% Injectable 25 Gram(s) IV Push once  dextrose 50% Injectable 12.5 Gram(s) IV Push once  dextrose 50% Injectable 25 Gram(s) IV Push once  glucagon  Injectable 1 milliGRAM(s) IntraMuscular once  heparin   Injectable 5000 Unit(s) SubCutaneous every 12 hours  hydrALAZINE 100 milliGRAM(s) Oral every 8 hours  insulin lispro (ADMELOG) corrective regimen sliding scale   SubCutaneous three times a day before meals  insulin lispro (ADMELOG) corrective regimen sliding scale   SubCutaneous at bedtime  insulin lispro Injectable (ADMELOG) 2 Unit(s) SubCutaneous three times a day before meals  isosorbide   dinitrate Tablet (ISORDIL) 10 milliGRAM(s) Oral three times a day  levothyroxine 125 MICROGram(s) Oral daily  NIFEdipine XL 60 milliGRAM(s) Oral daily  pantoprazole    Tablet 40 milliGRAM(s) Oral before breakfast  sodium bicarbonate 1300 milliGRAM(s) Oral three times a day    PRN Inpatient Medications  acetaminophen     Tablet .. 650 milliGRAM(s) Oral every 6 hours PRN  dextrose Oral Gel 15 Gram(s) Oral once PRN  guaiFENesin Oral Liquid (Sugar-Free) 200 milliGRAM(s) Oral every 6 hours PRN      REVIEW OF SYSTEMS  --------------------------------------------------------------------------------  as above    VITALS/PHYSICAL EXAM  --------------------------------------------------------------------------------  T(C): 36.4 (08-27-23 @ 05:05), Max: 36.7 (08-26-23 @ 18:51)  HR: 73 (08-27-23 @ 05:05) (66 - 73)  BP: 143/63 (08-27-23 @ 05:05) (117/52 - 143/63)  RR: 18 (08-27-23 @ 05:05) (17 - 18)  SpO2: 98% (08-27-23 @ 05:05) (95% - 98%)  Wt(kg): --    08-26-23 @ 07:01  -  08-27-23 @ 07:00  --------------------------------------------------------  IN: 400 mL / OUT: 2600 mL / NET: -2200 mL      Physical Exam:  Gen: NAD  HEENT: Anicteric  Pulm: ctab  CV: S1S2+  Abd: Soft, +BS    Ext: no LE edema   Neuro: Awake  Skin: Warm and dry    LABS/STUDIES  --------------------------------------------------------------------------------              7.4    7.85  >-----------<  157      [08-27-23 @ 06:29]              22.9     127  |  86  |  69  ----------------------------<  112      [08-27-23 @ 06:29]  4.5   |  29  |  3.08        Ca     9.4     [08-27-23 @ 06:29]      Mg     1.60     [08-27-23 @ 06:29]      Phos  5.0     [08-27-23 @ 06:29]    Creatinine Trend:  SCr 3.08 [08-27 @ 06:29]  SCr 2.97 [08-26 @ 08:22]  SCr 2.88 [08-25 @ 06:46]  SCr 2.95 [08-24 @ 22:47]  SCr 2.93 [08-23 @ 06:33]    Urinalysis - [08-27-23 @ 06:29]      Color  / Appearance  / SG  / pH       Gluc 112 / Ketone   / Bili  / Urobili        Blood  / Protein  / Leuk Est  / Nitrite       RBC  / WBC  / Hyaline  / Gran  / Sq Epi  / Non Sq Epi  / Bacteria     STEVO: titer 1:160, pattern Speckled      [08-21-23 @ 03:30]  Free Light Chains: kappa 10.90, lambda 6.34, ratio = 1.72      [08-24 @ 06:20] [facility] Division of Kidney Diseases & Hypertension  FOLLOW UP NOTE  [phone]--------------------------------------------------------------------------------  Chief Complaint:Heart failure    24 hour events/subjective: Pt seen and examined at bedside this AM. on bumex infusion since 8/25 and planned for RHC on monday. Reports feeling mfatigued. Denies fevers, HA, CP, SOB, n/v.    PAST HISTORY  --------------------------------------------------------------------------------  No significant changes to PMH, PSH, FHx, SHx, unless otherwise noted    ALLERGIES & MEDICATIONS  --------------------------------------------------------------------------------  Allergies  No Known Allergies  Intolerances    Standing Inpatient Medications  atorvastatin 40 milliGRAM(s) Oral at bedtime  buMETAnide Infusion 1 mG/Hr IV Continuous <Continuous>  darbepoetin Injectable ViaL 60 MICROGram(s) SubCutaneous once  dextrose 5%. 1000 milliLiter(s) IV Continuous <Continuous>  dextrose 5%. 1000 milliLiter(s) IV Continuous <Continuous>  dextrose 50% Injectable 25 Gram(s) IV Push once  dextrose 50% Injectable 12.5 Gram(s) IV Push once  dextrose 50% Injectable 25 Gram(s) IV Push once  glucagon  Injectable 1 milliGRAM(s) IntraMuscular once  heparin   Injectable 5000 Unit(s) SubCutaneous every 12 hours  hydrALAZINE 100 milliGRAM(s) Oral every 8 hours  insulin lispro (ADMELOG) corrective regimen sliding scale   SubCutaneous three times a day before meals  insulin lispro (ADMELOG) corrective regimen sliding scale   SubCutaneous at bedtime  insulin lispro Injectable (ADMELOG) 2 Unit(s) SubCutaneous three times a day before meals  isosorbide   dinitrate Tablet (ISORDIL) 10 milliGRAM(s) Oral three times a day  levothyroxine 125 MICROGram(s) Oral daily  NIFEdipine XL 60 milliGRAM(s) Oral daily  pantoprazole    Tablet 40 milliGRAM(s) Oral before breakfast  sodium bicarbonate 1300 milliGRAM(s) Oral three times a day    PRN Inpatient Medications  acetaminophen     Tablet .. 650 milliGRAM(s) Oral every 6 hours PRN  dextrose Oral Gel 15 Gram(s) Oral once PRN  guaiFENesin Oral Liquid (Sugar-Free) 200 milliGRAM(s) Oral every 6 hours PRN      REVIEW OF SYSTEMS  --------------------------------------------------------------------------------  as above    VITALS/PHYSICAL EXAM  --------------------------------------------------------------------------------  T(C): 36.4 (08-27-23 @ 05:05), Max: 36.7 (08-26-23 @ 18:51)  HR: 73 (08-27-23 @ 05:05) (66 - 73)  BP: 143/63 (08-27-23 @ 05:05) (117/52 - 143/63)  RR: 18 (08-27-23 @ 05:05) (17 - 18)  SpO2: 98% (08-27-23 @ 05:05) (95% - 98%)  Wt(kg): --    08-26-23 @ 07:01  -  08-27-23 @ 07:00  --------------------------------------------------------  IN: 400 mL / OUT: 2600 mL / NET: -2200 mL      Physical Exam:  Gen: NAD  HEENT: Anicteric  Pulm: bibasilar crackles   CV: S1S2+  Abd: Soft, +BS    Ext: +LE edema   Neuro: Awake  Skin: Warm and dry    LABS/STUDIES  --------------------------------------------------------------------------------              7.4    7.85  >-----------<  157      [08-27-23 @ 06:29]              22.9     127  |  86  |  69  ----------------------------<  112      [08-27-23 @ 06:29]  4.5   |  29  |  3.08        Ca     9.4     [08-27-23 @ 06:29]      Mg     1.60     [08-27-23 @ 06:29]      Phos  5.0     [08-27-23 @ 06:29]    Creatinine Trend:  SCr 3.08 [08-27 @ 06:29]  SCr 2.97 [08-26 @ 08:22]  SCr 2.88 [08-25 @ 06:46]  SCr 2.95 [08-24 @ 22:47]  SCr 2.93 [08-23 @ 06:33]    Urinalysis - [08-27-23 @ 06:29]      Color  / Appearance  / SG  / pH       Gluc 112 / Ketone   / Bili  / Urobili        Blood  / Protein  / Leuk Est  / Nitrite       RBC  / WBC  / Hyaline  / Gran  / Sq Epi  / Non Sq Epi  / Bacteria     STEVO: titer 1:160, pattern Speckled      [08-21-23 @ 03:30]  Free Light Chains: kappa 10.90, lambda 6.34, ratio = 1.72      [08-24 @ 06:20]

## 2023-08-27 NOTE — PROGRESS NOTE ADULT - PROBLEM SELECTOR PLAN 5
Downtrended to 127 this AM   -F/u with Serum Osm, urine osm, and urine Na   -Pt on IV bumex which might be causing the hyponatremia   -Will trend BMP q12 hrs   -F/u with nephrology

## 2023-08-27 NOTE — PROVIDER CONTACT NOTE (OTHER) - ACTION/TREATMENT ORDERED:
ACP aware - 1U to be ordered
Provider made aware- OK'd to ariana as not done d/t pt refusal.
administer acetaminophen 650 mg and apply ice packs
awaiting provider orders. night rn maribeth to review.
ACP made aware. Marked as PT refused.
ACP aware

## 2023-08-28 DIAGNOSIS — I50.813 ACUTE ON CHRONIC RIGHT HEART FAILURE: ICD-10-CM

## 2023-08-28 DIAGNOSIS — N17.9 ACUTE KIDNEY FAILURE, UNSPECIFIED: ICD-10-CM

## 2023-08-28 DIAGNOSIS — L03.90 CELLULITIS, UNSPECIFIED: ICD-10-CM

## 2023-08-28 LAB
ANION GAP SERPL CALC-SCNC: 15 MMOL/L — HIGH (ref 7–14)
BUN SERPL-MCNC: 72 MG/DL — HIGH (ref 7–23)
CALCIUM SERPL-MCNC: 9.1 MG/DL — SIGNIFICANT CHANGE UP (ref 8.4–10.5)
CHLORIDE SERPL-SCNC: 87 MMOL/L — LOW (ref 98–107)
CO2 SERPL-SCNC: 26 MMOL/L — SIGNIFICANT CHANGE UP (ref 22–31)
CREAT SERPL-MCNC: 3.27 MG/DL — HIGH (ref 0.5–1.3)
EGFR: 16 ML/MIN/1.73M2 — LOW
GLUCOSE BLDC GLUCOMTR-MCNC: 174 MG/DL — HIGH (ref 70–99)
GLUCOSE SERPL-MCNC: 159 MG/DL — HIGH (ref 70–99)
HCT VFR BLD CALC: 19.5 % — CRITICAL LOW (ref 34.5–45)
HCT VFR BLD CALC: 19.5 % — CRITICAL LOW (ref 34.5–45)
HCT VFR BLD CALC: 23.9 % — LOW (ref 34.5–45)
HGB BLD-MCNC: 6.4 G/DL — CRITICAL LOW (ref 11.5–15.5)
HGB BLD-MCNC: 6.5 G/DL — CRITICAL LOW (ref 11.5–15.5)
HGB BLD-MCNC: 8.1 G/DL — LOW (ref 11.5–15.5)
MAGNESIUM SERPL-MCNC: 1.6 MG/DL — SIGNIFICANT CHANGE UP (ref 1.6–2.6)
MCHC RBC-ENTMCNC: 26 PG — LOW (ref 27–34)
MCHC RBC-ENTMCNC: 26.4 PG — LOW (ref 27–34)
MCHC RBC-ENTMCNC: 27.1 PG — SIGNIFICANT CHANGE UP (ref 27–34)
MCHC RBC-ENTMCNC: 32.8 GM/DL — SIGNIFICANT CHANGE UP (ref 32–36)
MCHC RBC-ENTMCNC: 33.3 GM/DL — SIGNIFICANT CHANGE UP (ref 32–36)
MCHC RBC-ENTMCNC: 33.9 GM/DL — SIGNIFICANT CHANGE UP (ref 32–36)
MCV RBC AUTO: 79.3 FL — LOW (ref 80–100)
MCV RBC AUTO: 79.3 FL — LOW (ref 80–100)
MCV RBC AUTO: 79.9 FL — LOW (ref 80–100)
NRBC # BLD: 0 /100 WBCS — SIGNIFICANT CHANGE UP (ref 0–0)
NRBC # FLD: 0 K/UL — SIGNIFICANT CHANGE UP (ref 0–0)
NRBC # FLD: 0 K/UL — SIGNIFICANT CHANGE UP (ref 0–0)
NRBC # FLD: 0.02 K/UL — HIGH (ref 0–0)
PHOSPHATE SERPL-MCNC: 5 MG/DL — HIGH (ref 2.5–4.5)
PLATELET # BLD AUTO: 140 K/UL — LOW (ref 150–400)
PLATELET # BLD AUTO: 151 K/UL — SIGNIFICANT CHANGE UP (ref 150–400)
PLATELET # BLD AUTO: 163 K/UL — SIGNIFICANT CHANGE UP (ref 150–400)
POTASSIUM SERPL-MCNC: 4.5 MMOL/L — SIGNIFICANT CHANGE UP (ref 3.5–5.3)
POTASSIUM SERPL-SCNC: 4.5 MMOL/L — SIGNIFICANT CHANGE UP (ref 3.5–5.3)
RBC # BLD: 2.46 M/UL — LOW (ref 3.8–5.2)
RBC # BLD: 2.46 M/UL — LOW (ref 3.8–5.2)
RBC # BLD: 2.99 M/UL — LOW (ref 3.8–5.2)
RBC # FLD: 13.6 % — SIGNIFICANT CHANGE UP (ref 10.3–14.5)
RBC # FLD: 13.8 % — SIGNIFICANT CHANGE UP (ref 10.3–14.5)
RBC # FLD: 13.9 % — SIGNIFICANT CHANGE UP (ref 10.3–14.5)
SODIUM SERPL-SCNC: 128 MMOL/L — LOW (ref 135–145)
WBC # BLD: 6.96 K/UL — SIGNIFICANT CHANGE UP (ref 3.8–10.5)
WBC # BLD: 7.49 K/UL — SIGNIFICANT CHANGE UP (ref 3.8–10.5)
WBC # BLD: 8.32 K/UL — SIGNIFICANT CHANGE UP (ref 3.8–10.5)
WBC # FLD AUTO: 6.96 K/UL — SIGNIFICANT CHANGE UP (ref 3.8–10.5)
WBC # FLD AUTO: 7.49 K/UL — SIGNIFICANT CHANGE UP (ref 3.8–10.5)
WBC # FLD AUTO: 8.32 K/UL — SIGNIFICANT CHANGE UP (ref 3.8–10.5)

## 2023-08-28 PROCEDURE — 93451 RIGHT HEART CATH: CPT | Mod: 26

## 2023-08-28 PROCEDURE — 99233 SBSQ HOSP IP/OBS HIGH 50: CPT

## 2023-08-28 PROCEDURE — 99232 SBSQ HOSP IP/OBS MODERATE 35: CPT | Mod: GC

## 2023-08-28 RX ORDER — CEFAZOLIN SODIUM 1 G
1000 VIAL (EA) INJECTION EVERY 12 HOURS
Refills: 0 | Status: DISCONTINUED | OUTPATIENT
Start: 2023-08-29 | End: 2023-08-30

## 2023-08-28 RX ORDER — CEFAZOLIN SODIUM 1 G
VIAL (EA) INJECTION
Refills: 0 | Status: DISCONTINUED | OUTPATIENT
Start: 2023-08-28 | End: 2023-08-30

## 2023-08-28 RX ORDER — CEFAZOLIN SODIUM 1 G
1000 VIAL (EA) INJECTION ONCE
Refills: 0 | Status: COMPLETED | OUTPATIENT
Start: 2023-08-28 | End: 2023-08-28

## 2023-08-28 RX ADMIN — ISOSORBIDE DINITRATE 10 MILLIGRAM(S): 5 TABLET ORAL at 05:18

## 2023-08-28 RX ADMIN — ISOSORBIDE DINITRATE 10 MILLIGRAM(S): 5 TABLET ORAL at 21:24

## 2023-08-28 RX ADMIN — Medication 100 MILLIGRAM(S): at 16:41

## 2023-08-28 RX ADMIN — Medication 60 MILLIGRAM(S): at 05:19

## 2023-08-28 RX ADMIN — Medication 125 MICROGRAM(S): at 05:17

## 2023-08-28 RX ADMIN — Medication 100 MILLIGRAM(S): at 21:24

## 2023-08-28 RX ADMIN — PANTOPRAZOLE SODIUM 40 MILLIGRAM(S): 20 TABLET, DELAYED RELEASE ORAL at 05:18

## 2023-08-28 RX ADMIN — BUMETANIDE 5 MG/HR: 0.25 INJECTION INTRAMUSCULAR; INTRAVENOUS at 21:25

## 2023-08-28 RX ADMIN — Medication 650 MILLIGRAM(S): at 05:24

## 2023-08-28 RX ADMIN — Medication 1: at 21:52

## 2023-08-28 RX ADMIN — HEPARIN SODIUM 5000 UNIT(S): 5000 INJECTION INTRAVENOUS; SUBCUTANEOUS at 05:19

## 2023-08-28 RX ADMIN — Medication 100 MILLIGRAM(S): at 05:17

## 2023-08-28 RX ADMIN — HEPARIN SODIUM 5000 UNIT(S): 5000 INJECTION INTRAVENOUS; SUBCUTANEOUS at 18:57

## 2023-08-28 RX ADMIN — Medication 2 UNIT(S): at 13:02

## 2023-08-28 RX ADMIN — Medication 100 MILLIGRAM(S): at 13:37

## 2023-08-28 RX ADMIN — ATORVASTATIN CALCIUM 40 MILLIGRAM(S): 80 TABLET, FILM COATED ORAL at 21:24

## 2023-08-28 RX ADMIN — Medication 4: at 18:09

## 2023-08-28 RX ADMIN — Medication 1: at 13:02

## 2023-08-28 RX ADMIN — ISOSORBIDE DINITRATE 10 MILLIGRAM(S): 5 TABLET ORAL at 13:38

## 2023-08-28 RX ADMIN — Medication 2 UNIT(S): at 18:10

## 2023-08-28 NOTE — PROVIDER CONTACT NOTE (CRITICAL VALUE NOTIFICATION) - ACTION/TREATMENT ORDERED:
awaiting provider orders.
ACP made aware, pending repeat CBC to be ordered.
DICK Bernal aware. Pending 1 unit prbc's to be ordered.
ACP made aware, awaiting orders
See MD orders

## 2023-08-28 NOTE — PROGRESS NOTE ADULT - PROBLEM SELECTOR PLAN 1
IV Bumex 3mg Q12H  Hydralazine 100mg TID (hold SBP<90)  ISDN 10mg TID (hold SBP<90)  Strict I/O  Daily standing weights  Monitor lytes replete K>4.0 and Mg>2.0   Trend SCr  BNP = 5240 (6220 on 8/18)   Management per primary team  Appreciate Nephrology recommendations  HF counseling provided including CardioMEMs  Pending final recommendations from HF attending 8/28/23: RHC RA 15, PCWP 25, mean PA 35, CO 6.69, CI 4.25,   Elevated biventricular filling pressures, high-normal CO/CI.   Continue Bumex 1 mg/hr.  Hydralazine 100mg TID (hold SBP<100)  ISDN 10mg TID (hold SBP<100)  Procardia 60 mg po qd.   Strict I/O  Daily standing weights  Monitor lytes replete K to keep 4.0-5.0 and Mg>2.0

## 2023-08-28 NOTE — PROGRESS NOTE ADULT - ASSESSMENT
58 year old Female with PMH of  HTN, HLD, DM2, asthma, CKD (baseline SCr ~2.0), hypothyroidism c/b myxedema coma in past), anemia requiring recent transfusions (followed by GI with plan for scope), and HFpEF with severe pulmonary hypertension (EF 64%;LVIDd 4.6 RVE with normal RV function, mod-severe TR and severe PH RVSP~65). Patient presented with c/o progressive worsening SOB/MARKS and 3 pillow orthopnea X 5days; treated in the ED with IV Lasix 40mg followed by IV Bumex 2mg daily on 8/12 and 8/13.        HF Consult request for diuretic management and CardioMEMS implant (discussed/reviewed with patient and booklet provided, she will consider as OP)         Pertinent Labs: BNP  6, 450     Lactate 1.0    Troponin levels    51/48    BUN/Cr  66/2.3    TSH: 4.8  T3/T4  56/1.9            K/L  (1/2023)  8.9/3.75 ratio 2.35    CXR: Mild bilateral pleural effusions    EKG:  Normal sinus rhythm, possible Left atrial enlargement    TTE: Normal LV systolic function, Right ventricular enlargement with normal RV systolic function, Moderate-severe TR and PASP equals 65 consistent with PH.    RHC (1/2023):  RA 17 PA 58/27/41  PCWP  22 CO/CI 8.8/5.3  and PVR 2.16 POWERS       Home Meds:   Bumex 1mg BID    Hydralazine 100mg Q8H   Amlodipine 5mg daily        58 year old Female with PMH of  HTN, HLD, DM2, asthma, CKD (baseline SCr ~2.0), hypothyroidism c/b myxedema coma in past), anemia requiring recent transfusions (followed by GI with plan for scope), and HFpEF with severe pulmonary hypertension (EF 64%;LVIDd 4.6 RVE with normal RV function, mod-severe TR and severe PH RVSP~65). Patient presented with c/o progressive worsening SOB/MARKS and 3 pillow orthopnea X 5days; treated in the ED with IV Lasix 40mg followed by IV Bumex 2mg daily on 8/12 and 8/13.        HF Consult request for diuretic management and CardioMEMS implant (discussed/reviewed with patient and booklet provided, she will consider as OP)         Pertinent Labs: BNP  6, 450     Lactate 1.0    Troponin levels    51/48    BUN/Cr  66/2.3    TSH: 4.8  T3/T4  56/1.9            K/L  (1/2023)  8.9/3.75 ratio 2.35    CXR: Mild bilateral pleural effusions    EKG:  Normal sinus rhythm, possible Left atrial enlargement    TTE: Normal LV systolic function, Right ventricular enlargement with normal RV systolic function, Moderate-severe TR and PASP equals 65 consistent with PH.    RHC (1/2023):  RA 17 PA 58/27/41  PCWP  22 CO/CI 8.8/5.3  and PVR 2.16 POWERS       Home Meds:   Bumex 1mg BID    Hydralazine 100mg Q8H   Amlodipine 5mg daily     8/28/23: RHC RA 15, PCWP 25, mean PA 35, CO 6.69, CI 4.25,     Overall Stage C HF,  NYHA class III- moderately hypervolemic.

## 2023-08-28 NOTE — PROVIDER CONTACT NOTE (CRITICAL VALUE NOTIFICATION) - ASSESSMENT
Pt denies any pain or distress, vitals stable.
patient Hgb 7.0/Hct 20.4. Asymptomatic
Patient on procedure table, no complaints, vitals stable.
Pt denies any pain or distress, vitals stable.
hgb 6.9 hct 20.5

## 2023-08-28 NOTE — PROGRESS NOTE ADULT - SUBJECTIVE AND OBJECTIVE BOX
[facility] Division of Kidney Diseases & Hypertension  FOLLOW UP NOTE  [phone]--------------------------------------------------------------------------------    Chief Complaint:Heart failure    24 hour events/subjective: Pt seen and examined at bedside this AM. on bumex infusion since 8/25 and planned for RHC on monday. Reports feeling mfatigued. Denies fevers, HA, CP, SOB, n/v.    PAST HISTORY  --------------------------------------------------------------------------------  No significant changes to PMH, PSH, FHx, SHx, unless otherwise noted    ALLERGIES & MEDICATIONS  --------------------------------------------------------------------------------  Allergies  No Known Allergies    Intolerances    Standing Inpatient Medications  atorvastatin 40 milliGRAM(s) Oral at bedtime  buMETAnide Infusion 1 mG/Hr IV Continuous <Continuous>  ceFAZolin   IVPB      ceFAZolin   IVPB 1000 milliGRAM(s) IV Intermittent once  darbepoetin Injectable ViaL 60 MICROGram(s) SubCutaneous once  dextrose 5%. 1000 milliLiter(s) IV Continuous <Continuous>  dextrose 5%. 1000 milliLiter(s) IV Continuous <Continuous>  dextrose 50% Injectable 25 Gram(s) IV Push once  dextrose 50% Injectable 12.5 Gram(s) IV Push once  dextrose 50% Injectable 25 Gram(s) IV Push once  glucagon  Injectable 1 milliGRAM(s) IntraMuscular once  heparin   Injectable 5000 Unit(s) SubCutaneous every 12 hours  hydrALAZINE 100 milliGRAM(s) Oral every 8 hours  insulin lispro (ADMELOG) corrective regimen sliding scale   SubCutaneous three times a day before meals  insulin lispro (ADMELOG) corrective regimen sliding scale   SubCutaneous at bedtime  insulin lispro Injectable (ADMELOG) 2 Unit(s) SubCutaneous three times a day before meals  isosorbide   dinitrate Tablet (ISORDIL) 10 milliGRAM(s) Oral three times a day  levothyroxine 125 MICROGram(s) Oral daily  NIFEdipine XL 60 milliGRAM(s) Oral daily  oxyCODONE    IR 5 milliGRAM(s) Oral once  pantoprazole    Tablet 40 milliGRAM(s) Oral before breakfast    PRN Inpatient Medications  acetaminophen     Tablet .. 650 milliGRAM(s) Oral every 6 hours PRN  dextrose Oral Gel 15 Gram(s) Oral once PRN  guaiFENesin Oral Liquid (Sugar-Free) 200 milliGRAM(s) Oral every 6 hours PRN    REVIEW OF SYSTEMS  --------------------------------------------------------------------------------  All other systems were reviewed and are negative, except as noted.    VITALS/PHYSICAL EXAM  --------------------------------------------------------------------------------  T(C): 36.6 (08-28-23 @ 05:17), Max: 36.8 (08-27-23 @ 17:25)  HR: 75 (08-28-23 @ 05:17) (60 - 75)  BP: 139/64 (08-28-23 @ 05:17) (123/50 - 139/64)  RR: 17 (08-28-23 @ 05:17) (17 - 18)  SpO2: 99% (08-28-23 @ 05:17) (95% - 99%)  Wt(kg): --    08-27-23 @ 07:01  -  08-28-23 @ 07:00  --------------------------------------------------------  IN: 920 mL / OUT: 1600 mL / NET: -680 mL    Physical Exam:  Gen: NAD  HEENT: Anicteric  Pulm: bibasilar crackles   CV: S1S2+  Abd: Soft, +BS    Ext: no LE edema   Neuro: Awake  Skin: Warm and dry    LABS/STUDIES  --------------------------------------------------------------------------------              6.5    6.96  >-----------<  140      [08-28-23 @ 10:50]              19.5     128  |  87  |  72  ----------------------------<  159      [08-28-23 @ 05:23]  4.5   |  26  |  3.27        Ca     9.1     [08-28-23 @ 05:23]      Mg     1.60     [08-28-23 @ 05:23]      Phos  5.0     [08-28-23 @ 05:23]    Serum Osmolality 293      [08-27-23 @ 09:00]    Creatinine Trend:  SCr 3.27 [08-28 @ 05:23]  SCr 3.25 [08-27 @ 16:58]  SCr 3.08 [08-27 @ 06:29]  SCr 2.97 [08-26 @ 08:22]  SCr 2.88 [08-25 @ 06:46]    Urinalysis - [08-28-23 @ 05:23]      Color  / Appearance  / SG  / pH       Gluc 159 / Ketone   / Bili  / Urobili        Blood  / Protein  / Leuk Est  / Nitrite       RBC  / WBC  / Hyaline  / Gran  / Sq Epi  / Non Sq Epi  / Bacteria     Urine Creatinine 15      [08-27-23 @ 09:00]  Urine Protein 54      [08-27-23 @ 09:00]  Urine Sodium 101      [08-27-23 @ 09:00]  Urine Osmolality 287      [08-27-23 @ 09:00]    Free Light Chains: kappa 10.90, lambda 6.34, ratio = 1.72      [08-24 @ 06:20]

## 2023-08-28 NOTE — PROGRESS NOTE ADULT - PROBLEM SELECTOR PLAN 3
SNa 128. Likely from fluid overload due to HF. Contiue on bumex gtt. Monitor labs closely.      If you have any questions, please feel free to contact me.  Ezequiel Sierra  Nephrology Fellow  [id number] / [phone] / [employer] Teams (Preferred)  (After 4pm or on weekends, please call the on-call Fellow)

## 2023-08-28 NOTE — PROVIDER CONTACT NOTE (CRITICAL VALUE NOTIFICATION) - SITUATION
Hgb 6.7 Hct 20.0
patient Hgb 7.0/Hct 20.4
hgb 6.9 hct 20.5
Hgb 6.8 Hct 20.8
Pt having Right Heart Cath

## 2023-08-28 NOTE — PROGRESS NOTE ADULT - PROBLEM SELECTOR PLAN 1
Pt with ISAMAR on CKD likely in setting of HF exacerbation and anemia. Per [facility], baseline Scr ~2, admitted with Scr of 1.62, today 3.27 (8/28). UA w/ 300 protein. UPCR 4. Monitor BMP and strict I/Os. Dose meds per eGFR and avoid nephrotoxic agents.

## 2023-08-28 NOTE — PROGRESS NOTE ADULT - PROBLEM SELECTOR PLAN 6
matthew due to HF  -Pt on IV bumex which might be causing the hyponatremia   -Will trend BMPdaily  -F/u with nephrology

## 2023-08-28 NOTE — CHART NOTE - NSCHARTNOTEFT_GEN_A_CORE
LAUREN GERARDO 58y Female s/p cath Right brachial site check. Dressing is clear/dry/intact. Site is without hematoma or bleeding.   Patient denies pain, numbness, tingling, CP, SOB. VSS. Will continue to monitor.       Vital Signs Last 24 Hrs  T(C): 36.6 (28 Aug 2023 05:17), Max: 36.6 (28 Aug 2023 05:17)  T(F): 97.9 (28 Aug 2023 05:17), Max: 97.9 (28 Aug 2023 05:17)  HR: 75 (28 Aug 2023 05:17) (70 - 75)  BP: 139/64 (28 Aug 2023 05:17) (134/61 - 139/64)  BP(mean): --  RR: 17 (28 Aug 2023 05:17) (17 - 17)  SpO2: 99% (28 Aug 2023 05:17) (97% - 99%)    Parameters below as of 28 Aug 2023 05:17  Patient On (Oxygen Delivery Method): room air      Pulses palpable, cap refill <2 sec.     Rissa Velazco NP-BC  [id number]  Medicine

## 2023-08-28 NOTE — PROVIDER CONTACT NOTE (CRITICAL VALUE NOTIFICATION) - RECOMMENDATIONS
DICK Bernal aware. Pending 1 unit prbc's to be ordered.
provider notified
ACP made aware, pending repeat CBC to be ordered.
ACP made aware, awaiting orders
Work up for Anaemia as per MD /Stool Guiac

## 2023-08-28 NOTE — PROGRESS NOTE ADULT - SUBJECTIVE AND OBJECTIVE BOX
Medications:  acetaminophen     Tablet .. 650 milliGRAM(s) Oral every 6 hours PRN  atorvastatin 40 milliGRAM(s) Oral at bedtime  buMETAnide Infusion 1 mG/Hr IV Continuous <Continuous>  ceFAZolin   IVPB      darbepoetin Injectable ViaL 60 MICROGram(s) SubCutaneous once  dextrose 5%. 1000 milliLiter(s) IV Continuous <Continuous>  dextrose 5%. 1000 milliLiter(s) IV Continuous <Continuous>  dextrose 50% Injectable 25 Gram(s) IV Push once  dextrose 50% Injectable 12.5 Gram(s) IV Push once  dextrose 50% Injectable 25 Gram(s) IV Push once  dextrose Oral Gel 15 Gram(s) Oral once PRN  glucagon  Injectable 1 milliGRAM(s) IntraMuscular once  guaiFENesin Oral Liquid (Sugar-Free) 200 milliGRAM(s) Oral every 6 hours PRN  heparin   Injectable 5000 Unit(s) SubCutaneous every 12 hours  hydrALAZINE 100 milliGRAM(s) Oral every 8 hours  insulin lispro (ADMELOG) corrective regimen sliding scale   SubCutaneous three times a day before meals  insulin lispro (ADMELOG) corrective regimen sliding scale   SubCutaneous at bedtime  insulin lispro Injectable (ADMELOG) 2 Unit(s) SubCutaneous three times a day before meals  isosorbide   dinitrate Tablet (ISORDIL) 10 milliGRAM(s) Oral three times a day  levothyroxine 125 MICROGram(s) Oral daily  NIFEdipine XL 60 milliGRAM(s) Oral daily  oxyCODONE    IR 5 milliGRAM(s) Oral once  pantoprazole    Tablet 40 milliGRAM(s) Oral before breakfast      Vitals:  Vital Signs Last 24 Hrs  T(C): 36.6 (28 Aug 2023 05:17), Max: 36.8 (27 Aug 2023 17:25)  T(F): 97.9 (28 Aug 2023 05:17), Max: 98.2 (27 Aug 2023 17:25)  HR: 75 (28 Aug 2023 05:17) (60 - 75)  BP: 139/64 (28 Aug 2023 05:17) (123/50 - 139/64)  BP(mean): --  RR: 17 (28 Aug 2023 05:17) (17 - 18)  SpO2: 99% (28 Aug 2023 05:17) (95% - 99%)    Parameters below as of 28 Aug 2023 05:17  Patient On (Oxygen Delivery Method): room air        Daily     Daily Weight in k (28 Aug 2023 11:50)    I&O's Detail    27 Aug 2023 07:01  -  28 Aug 2023 07:00  --------------------------------------------------------  IN:    Bumetanide: 60 mL    Oral Fluid: 860 mL  Total IN: 920 mL    OUT:    IV PiggyBack: 0 mL    Voided (mL): 1600 mL  Total OUT: 1600 mL    Total NET: -680 mL          Physical Exam:     General: No distress. Comfortable.  HEENT: EOM intact.  Neck: Neck supple. JVP not elevated. No masses  Chest: Clear to auscultation bilaterally  CV: Normal S1 and S2. No murmurs, rub, or gallops. Radial pulses normal.  Abdomen: Soft, non-distended, non-tender  Skin: No rashes or skin breakdown  Neurology: Alert and oriented times three. Sensation intact  Psych: Affect normal    Labs:                        6.5    6.96  )-----------( 140      ( 28 Aug 2023 10:50 )             19.5         128<L>  |  87<L>  |  72<H>  ----------------------------<  159<H>  4.5   |  26  |  3.27<H>    Ca    9.1      28 Aug 2023 05:23  Phos  5.0       Mg     1.60                  Patient seen and examined. S/p C.       Medications:  acetaminophen     Tablet .. 650 milliGRAM(s) Oral every 6 hours PRN  atorvastatin 40 milliGRAM(s) Oral at bedtime  buMETAnide Infusion 1 mG/Hr IV Continuous <Continuous>  ceFAZolin   IVPB      darbepoetin Injectable ViaL 60 MICROGram(s) SubCutaneous once  dextrose 5%. 1000 milliLiter(s) IV Continuous <Continuous>  dextrose 5%. 1000 milliLiter(s) IV Continuous <Continuous>  dextrose 50% Injectable 25 Gram(s) IV Push once  dextrose 50% Injectable 12.5 Gram(s) IV Push once  dextrose 50% Injectable 25 Gram(s) IV Push once  dextrose Oral Gel 15 Gram(s) Oral once PRN  glucagon  Injectable 1 milliGRAM(s) IntraMuscular once  guaiFENesin Oral Liquid (Sugar-Free) 200 milliGRAM(s) Oral every 6 hours PRN  heparin   Injectable 5000 Unit(s) SubCutaneous every 12 hours  hydrALAZINE 100 milliGRAM(s) Oral every 8 hours  insulin lispro (ADMELOG) corrective regimen sliding scale   SubCutaneous three times a day before meals  insulin lispro (ADMELOG) corrective regimen sliding scale   SubCutaneous at bedtime  insulin lispro Injectable (ADMELOG) 2 Unit(s) SubCutaneous three times a day before meals  isosorbide   dinitrate Tablet (ISORDIL) 10 milliGRAM(s) Oral three times a day  levothyroxine 125 MICROGram(s) Oral daily  NIFEdipine XL 60 milliGRAM(s) Oral daily  oxyCODONE    IR 5 milliGRAM(s) Oral once  pantoprazole    Tablet 40 milliGRAM(s) Oral before breakfast      Vitals:  Vital Signs Last 24 Hrs  T(C): 36.6 (28 Aug 2023 05:17), Max: 36.8 (27 Aug 2023 17:25)  T(F): 97.9 (28 Aug 2023 05:17), Max: 98.2 (27 Aug 2023 17:25)  HR: 75 (28 Aug 2023 05:17) (60 - 75)  BP: 139/64 (28 Aug 2023 05:17) (123/50 - 139/64)  BP(mean): --  RR: 17 (28 Aug 2023 05:17) (17 - 18)  SpO2: 99% (28 Aug 2023 05:17) (95% - 99%)    Parameters below as of 28 Aug 2023 05:17  Patient On (Oxygen Delivery Method): room air        Daily     Daily Weight in k (28 Aug 2023 11:50)    I&O's Detail    27 Aug 2023 07:01  -  28 Aug 2023 07:00  --------------------------------------------------------  IN:    Bumetanide: 60 mL    Oral Fluid: 860 mL  Total IN: 920 mL    OUT:    IV PiggyBack: 0 mL    Voided (mL): 1600 mL  Total OUT: 1600 mL    Total NET: -680 mL          Physical Exam:     General: No distress. Comfortable.  HEENT: EOM intact.  Neck: Neck supple. JVP elevated. No masses  Chest: Clear to auscultation bilaterally  CV: Normal S1 and S2. No murmurs, rub, or gallops. Radial pulses normal. Tace b/l LE edema and is warm b/l.   Abdomen: Soft, non-distended, non-tender  Skin: No rashes or skin breakdown  Neurology: Alert and oriented times three. Sensation intact  Psych: Affect normal    Labs:                        6.5    6.96  )-----------( 140      ( 28 Aug 2023 10:50 )             19.5         128<L>  |  87<L>  |  72<H>  ----------------------------<  159<H>  4.5   |  26  |  3.27<H>    Ca    9.1      28 Aug 2023 05:23  Phos  5.0       Mg     1.60

## 2023-08-28 NOTE — PROGRESS NOTE ADULT - PROBLEM SELECTOR PLAN 1
#Acute hypoxic respiratory failure - resolved s/p BIPAP, now on oxgen via NC, will wean as tolerated  - TTE 8/6/23 with LVEF 69%, mod-sev TR, severe pulmonary HTN, BNP 6228  - Given the discrepancy between LV function and class 2 pulm HTN, consulted cardio - appreciate input  - Strict I/O, daily weight. Pt has had multiple admissions for CHF, please document dry weight upon discharge  Repeat CXR 8/14 shows mild b/l pleural effusions.   - course was complicated by worsening fluid status, renal failure, hence iV bumex switched to gtt  - s/p RHC 8/28  - Appreciate ongoing nephrology, cardiology, heart failure input, f/u further recs

## 2023-08-28 NOTE — PROGRESS NOTE ADULT - SUBJECTIVE AND OBJECTIVE BOX
[facility] Division of Hospital Medicine  Bobo Hart MD  Pager [phone]      Patient is a 58y old  Female who presents with a chief complaint of CHF Exacerbation (28 Aug 2023 14:45)      SUBJECTIVE / OVERNIGHT EVENTS: Improved SOB. LUE pain and swelling. No fever or chills    MEDICATIONS  (STANDING):  atorvastatin 40 milliGRAM(s) Oral at bedtime  buMETAnide Infusion 1 mG/Hr (5 mL/Hr) IV Continuous <Continuous>  ceFAZolin   IVPB      ceFAZolin   IVPB 1000 milliGRAM(s) IV Intermittent once  darbepoetin Injectable ViaL 60 MICROGram(s) SubCutaneous once  dextrose 5%. 1000 milliLiter(s) (100 mL/Hr) IV Continuous <Continuous>  dextrose 5%. 1000 milliLiter(s) (50 mL/Hr) IV Continuous <Continuous>  dextrose 50% Injectable 25 Gram(s) IV Push once  dextrose 50% Injectable 12.5 Gram(s) IV Push once  dextrose 50% Injectable 25 Gram(s) IV Push once  glucagon  Injectable 1 milliGRAM(s) IntraMuscular once  heparin   Injectable 5000 Unit(s) SubCutaneous every 12 hours  hydrALAZINE 100 milliGRAM(s) Oral every 8 hours  insulin lispro (ADMELOG) corrective regimen sliding scale   SubCutaneous three times a day before meals  insulin lispro (ADMELOG) corrective regimen sliding scale   SubCutaneous at bedtime  insulin lispro Injectable (ADMELOG) 2 Unit(s) SubCutaneous three times a day before meals  isosorbide   dinitrate Tablet (ISORDIL) 10 milliGRAM(s) Oral three times a day  levothyroxine 125 MICROGram(s) Oral daily  NIFEdipine XL 60 milliGRAM(s) Oral daily  oxyCODONE    IR 5 milliGRAM(s) Oral once  pantoprazole    Tablet 40 milliGRAM(s) Oral before breakfast    MEDICATIONS  (PRN):  acetaminophen     Tablet .. 650 milliGRAM(s) Oral every 6 hours PRN Mild Pain (1 - 3), Moderate Pain (4 - 6)  dextrose Oral Gel 15 Gram(s) Oral once PRN Blood Glucose LESS THAN 70 milliGRAM(s)/deciliter  guaiFENesin Oral Liquid (Sugar-Free) 200 milliGRAM(s) Oral every 6 hours PRN Cough      CAPILLARY BLOOD GLUCOSE      POCT Blood Glucose.: 160 mg/dL (28 Aug 2023 12:45)  POCT Blood Glucose.: 174 mg/dL (28 Aug 2023 08:16)  POCT Blood Glucose.: 298 mg/dL (27 Aug 2023 22:23)  POCT Blood Glucose.: 185 mg/dL (27 Aug 2023 17:44)    I&O's Summary    27 Aug 2023 07:01  -  28 Aug 2023 07:00  --------------------------------------------------------  IN: 920 mL / OUT: 1600 mL / NET: -680 mL        PHYSICAL EXAM:  Vital Signs Last 24 Hrs  T(C): 36.6 (28 Aug 2023 05:17), Max: 36.8 (27 Aug 2023 17:25)  T(F): 97.9 (28 Aug 2023 05:17), Max: 98.2 (27 Aug 2023 17:25)  HR: 75 (28 Aug 2023 05:17) (60 - 75)  BP: 139/64 (28 Aug 2023 05:17) (123/50 - 139/64)  BP(mean): --  RR: 17 (28 Aug 2023 05:17) (17 - 18)  SpO2: 99% (28 Aug 2023 05:17) (95% - 99%)    Parameters below as of 28 Aug 2023 05:17  Patient On (Oxygen Delivery Method): room air      CONSTITUTIONAL: NAD  EYES: conjunctiva and sclera clear  ENMT: mmm  NECK: Supple, +JVD  RESPIRATORY: Normal respiratory effort; lungs are clear to auscultation bilaterally  CARDIOVASCULAR: Regular rate and rhythm, + S1 and S2  ABDOMEN: Nontender to palpation, normoactive bowel sounds, no rebound/guarding  PSYCH: A+O x 3  Skin: L wrist swelling with erythema and warmth    LABS:                        6.5    6.96  )-----------( 140      ( 28 Aug 2023 10:50 )             19.5     08-28    128<L>  |  87<L>  |  72<H>  ----------------------------<  159<H>  4.5   |  26  |  3.27<H>    Ca    9.1      28 Aug 2023 05:23  Phos  5.0     08-28  Mg     1.60     08-28            Urinalysis Basic - ( 28 Aug 2023 05:23 )    Color: x / Appearance: x / SG: x / pH: x  Gluc: 159 mg/dL / Ketone: x  / Bili: x / Urobili: x   Blood: x / Protein: x / Nitrite: x   Leuk Esterase: x / RBC: x / WBC x   Sq Epi: x / Non Sq Epi: x / Bacteria: x

## 2023-08-28 NOTE — PROGRESS NOTE ADULT - PROBLEM SELECTOR PLAN 3
- Pt with CKD 3-4, baseline cr approximately 2, now with ISAMAR on CKD  - Monitor renal function/UOP, avoid nephrotoxins  - Restarted bicarb tabs  - per renal   -Cr had decreased, now again risen. Nephrology consulted, appreciate input.   FEUrea consistent with intrinsic pathology

## 2023-08-29 LAB
ALBUMIN SERPL ELPH-MCNC: 3.8 G/DL — SIGNIFICANT CHANGE UP (ref 3.3–5)
ALP SERPL-CCNC: 560 U/L — HIGH (ref 40–120)
ALT FLD-CCNC: 34 U/L — HIGH (ref 4–33)
ANION GAP SERPL CALC-SCNC: 16 MMOL/L — HIGH (ref 7–14)
AST SERPL-CCNC: 34 U/L — HIGH (ref 4–32)
BASOPHILS # BLD AUTO: 0.04 K/UL — SIGNIFICANT CHANGE UP (ref 0–0.2)
BASOPHILS NFR BLD AUTO: 0.5 % — SIGNIFICANT CHANGE UP (ref 0–2)
BILIRUB SERPL-MCNC: 1 MG/DL — SIGNIFICANT CHANGE UP (ref 0.2–1.2)
BUN SERPL-MCNC: 77 MG/DL — HIGH (ref 7–23)
CALCIUM SERPL-MCNC: 9.1 MG/DL — SIGNIFICANT CHANGE UP (ref 8.4–10.5)
CHLORIDE SERPL-SCNC: 86 MMOL/L — LOW (ref 98–107)
CO2 SERPL-SCNC: 25 MMOL/L — SIGNIFICANT CHANGE UP (ref 22–31)
CREAT SERPL-MCNC: 3.32 MG/DL — HIGH (ref 0.5–1.3)
EGFR: 16 ML/MIN/1.73M2 — LOW
EOSINOPHIL # BLD AUTO: 0.29 K/UL — SIGNIFICANT CHANGE UP (ref 0–0.5)
EOSINOPHIL NFR BLD AUTO: 3.6 % — SIGNIFICANT CHANGE UP (ref 0–6)
GLUCOSE SERPL-MCNC: 171 MG/DL — HIGH (ref 70–99)
HCT VFR BLD CALC: 24 % — LOW (ref 34.5–45)
HGB BLD-MCNC: 8.2 G/DL — LOW (ref 11.5–15.5)
IANC: 6.29 K/UL — SIGNIFICANT CHANGE UP (ref 1.8–7.4)
IMM GRANULOCYTES NFR BLD AUTO: 1.8 % — HIGH (ref 0–0.9)
LYMPHOCYTES # BLD AUTO: 0.69 K/UL — LOW (ref 1–3.3)
LYMPHOCYTES # BLD AUTO: 8.5 % — LOW (ref 13–44)
MAGNESIUM SERPL-MCNC: 1.7 MG/DL — SIGNIFICANT CHANGE UP (ref 1.6–2.6)
MCHC RBC-ENTMCNC: 26.8 PG — LOW (ref 27–34)
MCHC RBC-ENTMCNC: 34.2 GM/DL — SIGNIFICANT CHANGE UP (ref 32–36)
MCV RBC AUTO: 78.4 FL — LOW (ref 80–100)
MONOCYTES # BLD AUTO: 0.67 K/UL — SIGNIFICANT CHANGE UP (ref 0–0.9)
MONOCYTES NFR BLD AUTO: 8.2 % — SIGNIFICANT CHANGE UP (ref 2–14)
NEUTROPHILS # BLD AUTO: 6.29 K/UL — SIGNIFICANT CHANGE UP (ref 1.8–7.4)
NEUTROPHILS NFR BLD AUTO: 77.4 % — HIGH (ref 43–77)
NRBC # BLD: 0 /100 WBCS — SIGNIFICANT CHANGE UP (ref 0–0)
NRBC # FLD: 0 K/UL — SIGNIFICANT CHANGE UP (ref 0–0)
PHOSPHATE SERPL-MCNC: 4.7 MG/DL — HIGH (ref 2.5–4.5)
PLATELET # BLD AUTO: 158 K/UL — SIGNIFICANT CHANGE UP (ref 150–400)
POTASSIUM SERPL-MCNC: 4.6 MMOL/L — SIGNIFICANT CHANGE UP (ref 3.5–5.3)
POTASSIUM SERPL-SCNC: 4.6 MMOL/L — SIGNIFICANT CHANGE UP (ref 3.5–5.3)
PROT SERPL-MCNC: 7.5 G/DL — SIGNIFICANT CHANGE UP (ref 6–8.3)
RBC # BLD: 3.06 M/UL — LOW (ref 3.8–5.2)
RBC # FLD: 14 % — SIGNIFICANT CHANGE UP (ref 10.3–14.5)
SODIUM SERPL-SCNC: 127 MMOL/L — LOW (ref 135–145)
WBC # BLD: 8.13 K/UL — SIGNIFICANT CHANGE UP (ref 3.8–10.5)
WBC # FLD AUTO: 8.13 K/UL — SIGNIFICANT CHANGE UP (ref 3.8–10.5)

## 2023-08-29 PROCEDURE — 76882 US LMTD JT/FCL EVL NVASC XTR: CPT | Mod: 26,LT

## 2023-08-29 PROCEDURE — 99233 SBSQ HOSP IP/OBS HIGH 50: CPT | Mod: GC

## 2023-08-29 PROCEDURE — 99233 SBSQ HOSP IP/OBS HIGH 50: CPT

## 2023-08-29 RX ORDER — SODIUM CHLORIDE 5 G/100ML
500 INJECTION, SOLUTION INTRAVENOUS
Refills: 0 | Status: DISCONTINUED | OUTPATIENT
Start: 2023-08-29 | End: 2023-08-30

## 2023-08-29 RX ADMIN — Medication 100 MILLIGRAM(S): at 06:49

## 2023-08-29 RX ADMIN — HEPARIN SODIUM 5000 UNIT(S): 5000 INJECTION INTRAVENOUS; SUBCUTANEOUS at 06:49

## 2023-08-29 RX ADMIN — Medication 60 MILLIGRAM(S): at 06:48

## 2023-08-29 RX ADMIN — ISOSORBIDE DINITRATE 10 MILLIGRAM(S): 5 TABLET ORAL at 12:41

## 2023-08-29 RX ADMIN — ATORVASTATIN CALCIUM 40 MILLIGRAM(S): 80 TABLET, FILM COATED ORAL at 22:16

## 2023-08-29 RX ADMIN — Medication 125 MICROGRAM(S): at 06:48

## 2023-08-29 RX ADMIN — Medication 1: at 08:50

## 2023-08-29 RX ADMIN — BUMETANIDE 5 MG/HR: 0.25 INJECTION INTRAMUSCULAR; INTRAVENOUS at 22:20

## 2023-08-29 RX ADMIN — Medication 100 MILLIGRAM(S): at 17:15

## 2023-08-29 RX ADMIN — PANTOPRAZOLE SODIUM 40 MILLIGRAM(S): 20 TABLET, DELAYED RELEASE ORAL at 06:48

## 2023-08-29 RX ADMIN — Medication 2: at 17:51

## 2023-08-29 RX ADMIN — SODIUM CHLORIDE 30 MILLILITER(S): 5 INJECTION, SOLUTION INTRAVENOUS at 16:15

## 2023-08-29 RX ADMIN — Medication 100 MILLIGRAM(S): at 06:48

## 2023-08-29 RX ADMIN — Medication 2 UNIT(S): at 12:31

## 2023-08-29 RX ADMIN — Medication 2 UNIT(S): at 17:51

## 2023-08-29 RX ADMIN — ISOSORBIDE DINITRATE 10 MILLIGRAM(S): 5 TABLET ORAL at 22:16

## 2023-08-29 RX ADMIN — Medication 3: at 12:32

## 2023-08-29 RX ADMIN — Medication 100 MILLIGRAM(S): at 12:43

## 2023-08-29 RX ADMIN — Medication 100 MILLIGRAM(S): at 22:16

## 2023-08-29 RX ADMIN — ISOSORBIDE DINITRATE 10 MILLIGRAM(S): 5 TABLET ORAL at 06:48

## 2023-08-29 RX ADMIN — Medication 2: at 22:16

## 2023-08-29 RX ADMIN — HEPARIN SODIUM 5000 UNIT(S): 5000 INJECTION INTRAVENOUS; SUBCUTANEOUS at 17:17

## 2023-08-29 RX ADMIN — Medication 2 UNIT(S): at 08:50

## 2023-08-29 NOTE — PROGRESS NOTE ADULT - PROBLEM SELECTOR PLAN 3
SNa 128, labs pending today. Likely from fluid overload due to HF. Continue on bumex gtt. Monitor labs closely.    Final recommendations pending attending signature.    If you have any questions, please feel free to contact me  Chay Koehler  Nephrology Fellow  [place]/Page [phone]  (After 5pm or on weekends please page the on-call fellow) SNa 127. Give 3% HTS 30cc/hr for 5hrs and then rpt BMP. Continue on bumex gtt. Monitor labs closely.    Final recommendations pending attending signature.    If you have any questions, please feel free to contact me  Chay Koehler  Nephrology Fellow  [employer]/Page [phone]  (After 5pm or on weekends please page the on-call fellow) SNa 127. Give 3% HTS 30cc/hr for 5hrs and then rpt BMP. Continue on bumex gtt. Monitor labs closely.        If you have any questions, please feel free to contact me  Chay Koehler  Nephrology Fellow  [employer]/Page [phone]  (After 5pm or on weekends please page the on-call fellow)

## 2023-08-29 NOTE — PROGRESS NOTE ADULT - PROBLEM SELECTOR PLAN 1
Pt with ISAMAR on CKD likely in setting of HF exacerbation and anemia. Per [facility], baseline Scr ~2, admitted with Scr of 1.62, and 3.27 (8/28). Labs pending today. UA w/ 300 protein. UPCR 4. c/w bumex ggt. Monitor BMP and strict I/Os. Dose meds per eGFR and avoid nephrotoxic agents. Pt with ISAMAR on CKD likely in setting of HF exacerbation and anemia. Per [facility], baseline Scr ~2, admitted with Scr of 1.62, and 3.32 today. UA w/ 300 protein. UPCR 4. c/w bumex ggt. Monitor BMP and strict I/Os. Dose meds per eGFR and avoid nephrotoxic agents.

## 2023-08-29 NOTE — PROGRESS NOTE ADULT - PROBLEM SELECTOR PLAN 1
#Acute hypoxic respiratory failure - resolved s/p BIPAP, now on oxgen via NC, will wean as tolerated  - TTE 8/6/23 with LVEF 69%, mod-sev TR, severe pulmonary HTN, BNP 6228  - Given the discrepancy between LV function and class 2 pulm HTN, consulted cardio - appreciate input  - Strict I/O, daily weight. Pt has had multiple admissions for CHF, please document dry weight upon discharge  Repeat CXR 8/14 shows mild b/l pleural effusions.   - course was complicated by worsening fluid status, renal failure, hence iV bumex switched to gtt  - s/p RHC 8/28, elevated PCWP, will need ongoing diuresis  - Appreciate ongoing nephrology, cardiology, heart failure input, f/u further recs  - Elevated transaminases likely due to congestive hepatopathy, will monitor

## 2023-08-29 NOTE — PROGRESS NOTE ADULT - SUBJECTIVE AND OBJECTIVE BOX
[facility] Division of Hospital Medicine  Bobo Hart MD  Pager [phone]      Patient is a 58y old  Female who presents with a chief complaint of CHF Exacerbation (29 Aug 2023 11:34)      SUBJECTIVE / OVERNIGHT EVENTS: No SOB or CP. No fever or chills. Mild improvement of wrist swelling    MEDICATIONS  (STANDING):  atorvastatin 40 milliGRAM(s) Oral at bedtime  buMETAnide Infusion 1 mG/Hr (5 mL/Hr) IV Continuous <Continuous>  ceFAZolin   IVPB      ceFAZolin   IVPB 1000 milliGRAM(s) IV Intermittent every 12 hours  darbepoetin Injectable ViaL 60 MICROGram(s) SubCutaneous once  dextrose 5%. 1000 milliLiter(s) (50 mL/Hr) IV Continuous <Continuous>  dextrose 5%. 1000 milliLiter(s) (100 mL/Hr) IV Continuous <Continuous>  dextrose 50% Injectable 25 Gram(s) IV Push once  dextrose 50% Injectable 12.5 Gram(s) IV Push once  dextrose 50% Injectable 25 Gram(s) IV Push once  glucagon  Injectable 1 milliGRAM(s) IntraMuscular once  heparin   Injectable 5000 Unit(s) SubCutaneous every 12 hours  hydrALAZINE 100 milliGRAM(s) Oral every 8 hours  insulin lispro (ADMELOG) corrective regimen sliding scale   SubCutaneous three times a day before meals  insulin lispro (ADMELOG) corrective regimen sliding scale   SubCutaneous at bedtime  insulin lispro Injectable (ADMELOG) 2 Unit(s) SubCutaneous three times a day before meals  isosorbide   dinitrate Tablet (ISORDIL) 10 milliGRAM(s) Oral three times a day  levothyroxine 125 MICROGram(s) Oral daily  NIFEdipine XL 60 milliGRAM(s) Oral daily  oxyCODONE    IR 5 milliGRAM(s) Oral once  pantoprazole    Tablet 40 milliGRAM(s) Oral before breakfast    MEDICATIONS  (PRN):  acetaminophen     Tablet .. 650 milliGRAM(s) Oral every 6 hours PRN Mild Pain (1 - 3), Moderate Pain (4 - 6)  dextrose Oral Gel 15 Gram(s) Oral once PRN Blood Glucose LESS THAN 70 milliGRAM(s)/deciliter  guaiFENesin Oral Liquid (Sugar-Free) 200 milliGRAM(s) Oral every 6 hours PRN Cough      CAPILLARY BLOOD GLUCOSE      POCT Blood Glucose.: 292 mg/dL (29 Aug 2023 12:30)  POCT Blood Glucose.: 177 mg/dL (29 Aug 2023 08:48)  POCT Blood Glucose.: 285 mg/dL (28 Aug 2023 21:23)  POCT Blood Glucose.: 328 mg/dL (28 Aug 2023 18:08)    I&O's Summary    28 Aug 2023 07:01  -  29 Aug 2023 07:00  --------------------------------------------------------  IN: 400 mL / OUT: 2 mL / NET: 398 mL    29 Aug 2023 07:01  -  29 Aug 2023 13:49  --------------------------------------------------------  IN: 160 mL / OUT: 0 mL / NET: 160 mL        PHYSICAL EXAM:  Vital Signs Last 24 Hrs  T(C): 36.2 (29 Aug 2023 12:40), Max: 37 (28 Aug 2023 18:30)  T(F): 97.2 (29 Aug 2023 12:40), Max: 98.6 (28 Aug 2023 18:30)  HR: 68 (29 Aug 2023 12:40) (67 - 73)  BP: 133/59 (29 Aug 2023 12:40) (120/57 - 146/55)  BP(mean): --  RR: 18 (29 Aug 2023 12:40) (17 - 18)  SpO2: 98% (29 Aug 2023 12:40) (98% - 100%)    Parameters below as of 29 Aug 2023 12:40  Patient On (Oxygen Delivery Method): room air      CONSTITUTIONAL: NAD  EYES: conjunctiva and sclera clear  ENMT: mmm  NECK: Supple,  RESPIRATORY: Normal respiratory effort; lungs are clear to auscultation bilaterally  CARDIOVASCULAR: Regular rate and rhythm, + S1 and S2  ABDOMEN: Nontender to palpation, normoactive bowel sounds, no rebound/guarding  PSYCH: A+O x 3  Skin: improved erythema of L wrist    LABS:                        8.2    8.13  )-----------( 158      ( 29 Aug 2023 05:56 )             24.0     08-29    127<L>  |  86<L>  |  77<H>  ----------------------------<  171<H>  4.6   |  25  |  3.32<H>    Ca    9.1      29 Aug 2023 05:56  Phos  4.7     08-29  Mg     1.70     08-29    TPro  7.5  /  Alb  3.8  /  TBili  1.0  /  DBili  x   /  AST  34<H>  /  ALT  34<H>  /  AlkPhos  560<H>  08-29          Urinalysis Basic - ( 29 Aug 2023 05:56 )    Color: x / Appearance: x / SG: x / pH: x  Gluc: 171 mg/dL / Ketone: x  / Bili: x / Urobili: x   Blood: x / Protein: x / Nitrite: x   Leuk Esterase: x / RBC: x / WBC x   Sq Epi: x / Non Sq Epi: x / Bacteria: x

## 2023-08-29 NOTE — PROGRESS NOTE ADULT - PROBLEM SELECTOR PLAN 1
8/28/23: RHC RA 15, PCWP 25, mean PA 35, CO 6.69, CI 4.25,   Elevated biventricular filling pressures, high-normal CO/CI.   Continue Bumex 1 mg/hr. Hypertonic saline per renal.   Hydralazine 100mg TID (hold SBP<100)  ISDN 10mg TID (hold SBP<100)  Procardia 60 mg po qd.   Strict I/O  Daily standing weights  Monitor lytes replete K to keep 4.0-5.0 and Mg>2.0

## 2023-08-29 NOTE — PROGRESS NOTE ADULT - SUBJECTIVE AND OBJECTIVE BOX
[facility] Division of Kidney Diseases & Hypertension  FOLLOW UP NOTE  [phone]--------------------------------------------------------------------------------  Chief Complaint:Heart failure    24 hour events/subjective: Pt seen and examined at bedside this AM. on bumex infusion since 8/25 and s/p RHC 8/28. Reports feeling fatigued. Denies fevers, HA, CP, SOB, n/v.    PAST HISTORY  --------------------------------------------------------------------------------  No significant changes to PMH, PSH, FHx, SHx, unless otherwise noted    ALLERGIES & MEDICATIONS  --------------------------------------------------------------------------------  Allergies  No Known Allergies  Intolerances    Standing Inpatient Medications  atorvastatin 40 milliGRAM(s) Oral at bedtime  buMETAnide Infusion 1 mG/Hr IV Continuous <Continuous>  ceFAZolin   IVPB      ceFAZolin   IVPB 1000 milliGRAM(s) IV Intermittent every 12 hours  darbepoetin Injectable ViaL 60 MICROGram(s) SubCutaneous once  dextrose 5%. 1000 milliLiter(s) IV Continuous <Continuous>  dextrose 5%. 1000 milliLiter(s) IV Continuous <Continuous>  dextrose 50% Injectable 25 Gram(s) IV Push once  dextrose 50% Injectable 12.5 Gram(s) IV Push once  dextrose 50% Injectable 25 Gram(s) IV Push once  glucagon  Injectable 1 milliGRAM(s) IntraMuscular once  heparin   Injectable 5000 Unit(s) SubCutaneous every 12 hours  hydrALAZINE 100 milliGRAM(s) Oral every 8 hours  insulin lispro (ADMELOG) corrective regimen sliding scale   SubCutaneous three times a day before meals  insulin lispro (ADMELOG) corrective regimen sliding scale   SubCutaneous at bedtime  insulin lispro Injectable (ADMELOG) 2 Unit(s) SubCutaneous three times a day before meals  isosorbide   dinitrate Tablet (ISORDIL) 10 milliGRAM(s) Oral three times a day  levothyroxine 125 MICROGram(s) Oral daily  NIFEdipine XL 60 milliGRAM(s) Oral daily  oxyCODONE    IR 5 milliGRAM(s) Oral once  pantoprazole    Tablet 40 milliGRAM(s) Oral before breakfast    PRN Inpatient Medications  acetaminophen     Tablet .. 650 milliGRAM(s) Oral every 6 hours PRN  dextrose Oral Gel 15 Gram(s) Oral once PRN  guaiFENesin Oral Liquid (Sugar-Free) 200 milliGRAM(s) Oral every 6 hours PRN      REVIEW OF SYSTEMS  --------------------------------------------------------------------------------  as above    VITALS/PHYSICAL EXAM  --------------------------------------------------------------------------------  T(C): 36.2 (08-28-23 @ 21:00), Max: 37 (08-28-23 @ 18:30)  HR: 70 (08-28-23 @ 21:00) (67 - 70)  BP: 144/61 (08-28-23 @ 21:00) (120/57 - 144/61)  RR: 17 (08-28-23 @ 21:00) (17 - 18)  SpO2: 100% (08-28-23 @ 21:00) (100% - 100%)  Wt(kg): --    08-28-23 @ 07:01  -  08-29-23 @ 07:00  --------------------------------------------------------  IN: 400 mL / OUT: 2 mL / NET: 398 mL      Physical Exam:  Gen: NAD  HEENT: Anicteric  Pulm: bibasilar crackles   CV: S1S2+  Abd: Soft, +BS    Ext: LE edema   Neuro: Awake  Skin: Warm and dry    LABS/STUDIES  --------------------------------------------------------------------------------              8.1    8.32  >-----------<  163      [08-28-23 @ 23:31]              23.9     128  |  87  |  72  ----------------------------<  159      [08-28-23 @ 05:23]  4.5   |  26  |  3.27        Ca     9.1     [08-28-23 @ 05:23]      Mg     1.60     [08-28-23 @ 05:23]      Phos  5.0     [08-28-23 @ 05:23]    Serum Osmolality 293      [08-27-23 @ 09:00]    Creatinine Trend:  SCr 3.27 [08-28 @ 05:23]  SCr 3.25 [08-27 @ 16:58]  SCr 3.08 [08-27 @ 06:29]  SCr 2.97 [08-26 @ 08:22]  SCr 2.88 [08-25 @ 06:46]    Urinalysis - [08-28-23 @ 05:23]      Color  / Appearance  / SG  / pH       Gluc 159 / Ketone   / Bili  / Urobili        Blood  / Protein  / Leuk Est  / Nitrite       RBC  / WBC  / Hyaline  / Gran  / Sq Epi  / Non Sq Epi  / Bacteria     Urine Creatinine 15      [08-27-23 @ 09:00]  Urine Protein 54      [08-27-23 @ 09:00]  Urine Sodium 101      [08-27-23 @ 09:00]  Urine Osmolality 287      [08-27-23 @ 09:00]    Free Light Chains: kappa 10.90, lambda 6.34, ratio = 1.72      [08-24 @ 06:20]   [facility] Division of Kidney Diseases & Hypertension  FOLLOW UP NOTE  [phone]--------------------------------------------------------------------------------  Chief Complaint:Heart failure    24 hour events/subjective: Pt seen and examined at bedside this AM. on bumex infusion since 8/25 and s/p RHC 8/28. Reports feeling fatigued. Denies fevers, HA, CP, SOB, n/v.    PAST HISTORY  --------------------------------------------------------------------------------  No significant changes to PMH, PSH, FHx, SHx, unless otherwise noted    ALLERGIES & MEDICATIONS  --------------------------------------------------------------------------------  Allergies  No Known Allergies  Intolerances    Standing Inpatient Medications  atorvastatin 40 milliGRAM(s) Oral at bedtime  buMETAnide Infusion 1 mG/Hr IV Continuous <Continuous>  ceFAZolin   IVPB      ceFAZolin   IVPB 1000 milliGRAM(s) IV Intermittent every 12 hours  darbepoetin Injectable ViaL 60 MICROGram(s) SubCutaneous once  dextrose 5%. 1000 milliLiter(s) IV Continuous <Continuous>  dextrose 5%. 1000 milliLiter(s) IV Continuous <Continuous>  dextrose 50% Injectable 25 Gram(s) IV Push once  dextrose 50% Injectable 12.5 Gram(s) IV Push once  dextrose 50% Injectable 25 Gram(s) IV Push once  glucagon  Injectable 1 milliGRAM(s) IntraMuscular once  heparin   Injectable 5000 Unit(s) SubCutaneous every 12 hours  hydrALAZINE 100 milliGRAM(s) Oral every 8 hours  insulin lispro (ADMELOG) corrective regimen sliding scale   SubCutaneous three times a day before meals  insulin lispro (ADMELOG) corrective regimen sliding scale   SubCutaneous at bedtime  insulin lispro Injectable (ADMELOG) 2 Unit(s) SubCutaneous three times a day before meals  isosorbide   dinitrate Tablet (ISORDIL) 10 milliGRAM(s) Oral three times a day  levothyroxine 125 MICROGram(s) Oral daily  NIFEdipine XL 60 milliGRAM(s) Oral daily  oxyCODONE    IR 5 milliGRAM(s) Oral once  pantoprazole    Tablet 40 milliGRAM(s) Oral before breakfast    PRN Inpatient Medications  acetaminophen     Tablet .. 650 milliGRAM(s) Oral every 6 hours PRN  dextrose Oral Gel 15 Gram(s) Oral once PRN  guaiFENesin Oral Liquid (Sugar-Free) 200 milliGRAM(s) Oral every 6 hours PRN      REVIEW OF SYSTEMS  --------------------------------------------------------------------------------  as above    VITALS/PHYSICAL EXAM  --------------------------------------------------------------------------------  T(C): 36.2 (08-28-23 @ 21:00), Max: 37 (08-28-23 @ 18:30)  HR: 70 (08-28-23 @ 21:00) (67 - 70)  BP: 144/61 (08-28-23 @ 21:00) (120/57 - 144/61)  RR: 17 (08-28-23 @ 21:00) (17 - 18)  SpO2: 100% (08-28-23 @ 21:00) (100% - 100%)  Wt(kg): --    08-28-23 @ 07:01  -  08-29-23 @ 07:00  --------------------------------------------------------  IN: 400 mL / OUT: 2 mL / NET: 398 mL      Physical Exam:  Gen: NAD  HEENT: Anicteric  Pulm: ctab b/l  CV: S1S2+  Abd: Soft, +BS    Ext: no LE edema   Neuro: Awake  Skin: Warm and dry    LABS/STUDIES  --------------------------------------------------------------------------------              8.1    8.32  >-----------<  163      [08-28-23 @ 23:31]              23.9     128  |  87  |  72  ----------------------------<  159      [08-28-23 @ 05:23]  4.5   |  26  |  3.27        Ca     9.1     [08-28-23 @ 05:23]      Mg     1.60     [08-28-23 @ 05:23]      Phos  5.0     [08-28-23 @ 05:23]    Serum Osmolality 293      [08-27-23 @ 09:00]    Creatinine Trend:  SCr 3.27 [08-28 @ 05:23]  SCr 3.25 [08-27 @ 16:58]  SCr 3.08 [08-27 @ 06:29]  SCr 2.97 [08-26 @ 08:22]  SCr 2.88 [08-25 @ 06:46]    Urinalysis - [08-28-23 @ 05:23]      Color  / Appearance  / SG  / pH       Gluc 159 / Ketone   / Bili  / Urobili        Blood  / Protein  / Leuk Est  / Nitrite       RBC  / WBC  / Hyaline  / Gran  / Sq Epi  / Non Sq Epi  / Bacteria     Urine Creatinine 15      [08-27-23 @ 09:00]  Urine Protein 54      [08-27-23 @ 09:00]  Urine Sodium 101      [08-27-23 @ 09:00]  Urine Osmolality 287      [08-27-23 @ 09:00]    Free Light Chains: kappa 10.90, lambda 6.34, ratio = 1.72      [08-24 @ 06:20]

## 2023-08-29 NOTE — PROGRESS NOTE ADULT - ASSESSMENT
58 year old Female with PMH of  HTN, HLD, DM2, asthma, CKD (baseline SCr ~2.0), hypothyroidism c/b myxedema coma in past), anemia requiring recent transfusions (followed by GI with plan for scope), and HFpEF with severe pulmonary hypertension (EF 64%;LVIDd 4.6 RVE with normal RV function, mod-severe TR and severe PH RVSP~65). Patient presented with c/o progressive worsening SOB/MARKS and 3 pillow orthopnea X 5days; treated in the ED with IV Lasix 40mg followed by IV Bumex 2mg daily on 8/12 and 8/13.        HF Consult request for diuretic management and CardioMEMS implant (discussed/reviewed with patient and booklet provided, she will consider as OP)         Pertinent Labs: BNP  6, 450     Lactate 1.0    Troponin levels    51/48    BUN/Cr  66/2.3    TSH: 4.8  T3/T4  56/1.9            K/L  (1/2023)  8.9/3.75 ratio 2.35    CXR: Mild bilateral pleural effusions    EKG:  Normal sinus rhythm, possible Left atrial enlargement    TTE: Normal LV systolic function, Right ventricular enlargement with normal RV systolic function, Moderate-severe TR and PASP equals 65 consistent with PH.    RHC (1/2023):  RA 17 PA 58/27/41  PCWP  22 CO/CI 8.8/5.3  and PVR 2.16 POWERS       Home Meds:   Bumex 1mg BID    Hydralazine 100mg Q8H   Amlodipine 5mg daily     8/28/23: RHC RA 15, PCWP 25, mean PA 35, CO 6.69, CI 4.25,     Overall Stage C HF,  NYHA class III- Was moderately hypervolemic, but now improving.

## 2023-08-29 NOTE — PROGRESS NOTE ADULT - SUBJECTIVE AND OBJECTIVE BOX
Medications:  acetaminophen     Tablet .. 650 milliGRAM(s) Oral every 6 hours PRN  atorvastatin 40 milliGRAM(s) Oral at bedtime  buMETAnide Infusion 1 mG/Hr IV Continuous <Continuous>  ceFAZolin   IVPB      ceFAZolin   IVPB 1000 milliGRAM(s) IV Intermittent every 12 hours  darbepoetin Injectable ViaL 60 MICROGram(s) SubCutaneous once  dextrose 5%. 1000 milliLiter(s) IV Continuous <Continuous>  dextrose 5%. 1000 milliLiter(s) IV Continuous <Continuous>  dextrose 50% Injectable 25 Gram(s) IV Push once  dextrose 50% Injectable 12.5 Gram(s) IV Push once  dextrose 50% Injectable 25 Gram(s) IV Push once  dextrose Oral Gel 15 Gram(s) Oral once PRN  glucagon  Injectable 1 milliGRAM(s) IntraMuscular once  guaiFENesin Oral Liquid (Sugar-Free) 200 milliGRAM(s) Oral every 6 hours PRN  heparin   Injectable 5000 Unit(s) SubCutaneous every 12 hours  hydrALAZINE 100 milliGRAM(s) Oral every 8 hours  insulin lispro (ADMELOG) corrective regimen sliding scale   SubCutaneous three times a day before meals  insulin lispro (ADMELOG) corrective regimen sliding scale   SubCutaneous at bedtime  insulin lispro Injectable (ADMELOG) 2 Unit(s) SubCutaneous three times a day before meals  isosorbide   dinitrate Tablet (ISORDIL) 10 milliGRAM(s) Oral three times a day  levothyroxine 125 MICROGram(s) Oral daily  NIFEdipine XL 60 milliGRAM(s) Oral daily  oxyCODONE    IR 5 milliGRAM(s) Oral once  pantoprazole    Tablet 40 milliGRAM(s) Oral before breakfast      Vitals:  Vital Signs Last 24 Hrs  T(C): 36.4 (29 Aug 2023 06:45), Max: 37 (28 Aug 2023 18:30)  T(F): 97.5 (29 Aug 2023 06:45), Max: 98.6 (28 Aug 2023 18:30)  HR: 73 (29 Aug 2023 06:45) (67 - 73)  BP: 146/55 (29 Aug 2023 06:45) (120/57 - 146/55)  BP(mean): --  RR: 18 (29 Aug 2023 06:45) (17 - 18)  SpO2: 100% (29 Aug 2023 06:45) (100% - 100%)    Parameters below as of 29 Aug 2023 06:45  Patient On (Oxygen Delivery Method): room air        Daily     Daily Weight in k.9 (29 Aug 2023 06:45)    I&O's Detail    28 Aug 2023 07:01  -  29 Aug 2023 07:00  --------------------------------------------------------  IN:    Oral Fluid: 400 mL  Total IN: 400 mL    OUT:    Voided (mL): 2 mL  Total OUT: 2 mL    Total NET: 398 mL      29 Aug 2023 07:01  -  29 Aug 2023 11:34  --------------------------------------------------------  IN:    Oral Fluid: 160 mL  Total IN: 160 mL    OUT:  Total OUT: 0 mL    Total NET: 160 mL          Physical Exam:     General: No distress. Comfortable.  HEENT: EOM intact.  Neck: Neck supple. JVP not elevated. No masses  Chest: Clear to auscultation bilaterally  CV: Normal S1 and S2. No murmurs, rub, or gallops. Radial pulses normal.  Abdomen: Soft, non-distended, non-tender  Skin: No rashes or skin breakdown  Neurology: Alert and oriented times three. Sensation intact  Psych: Affect normal    Labs:                        8.2    8.13  )-----------( 158      ( 29 Aug 2023 05:56 )             24.0         127<L>  |  86<L>  |  77<H>  ----------------------------<  171<H>  4.6   |  25  |  3.32<H>    Ca    9.1      29 Aug 2023 05:56  Phos  4.7       Mg     1.70         TPro  7.5  /  Alb  3.8  /  TBili  1.0  /  DBili  x   /  AST  34<H>  /  ALT  34<H>  /  AlkPhos  560<H>             Patient seen and examined. She states she feels much better, no SOB at rest, MARKS, CP, palpitations, dizziness.   Asking to go home tomorrow.       Medications:  acetaminophen     Tablet .. 650 milliGRAM(s) Oral every 6 hours PRN  atorvastatin 40 milliGRAM(s) Oral at bedtime  buMETAnide Infusion 1 mG/Hr IV Continuous <Continuous>  ceFAZolin   IVPB      ceFAZolin   IVPB 1000 milliGRAM(s) IV Intermittent every 12 hours  darbepoetin Injectable ViaL 60 MICROGram(s) SubCutaneous once  dextrose 5%. 1000 milliLiter(s) IV Continuous <Continuous>  dextrose 5%. 1000 milliLiter(s) IV Continuous <Continuous>  dextrose 50% Injectable 25 Gram(s) IV Push once  dextrose 50% Injectable 12.5 Gram(s) IV Push once  dextrose 50% Injectable 25 Gram(s) IV Push once  dextrose Oral Gel 15 Gram(s) Oral once PRN  glucagon  Injectable 1 milliGRAM(s) IntraMuscular once  guaiFENesin Oral Liquid (Sugar-Free) 200 milliGRAM(s) Oral every 6 hours PRN  heparin   Injectable 5000 Unit(s) SubCutaneous every 12 hours  hydrALAZINE 100 milliGRAM(s) Oral every 8 hours  insulin lispro (ADMELOG) corrective regimen sliding scale   SubCutaneous three times a day before meals  insulin lispro (ADMELOG) corrective regimen sliding scale   SubCutaneous at bedtime  insulin lispro Injectable (ADMELOG) 2 Unit(s) SubCutaneous three times a day before meals  isosorbide   dinitrate Tablet (ISORDIL) 10 milliGRAM(s) Oral three times a day  levothyroxine 125 MICROGram(s) Oral daily  NIFEdipine XL 60 milliGRAM(s) Oral daily  oxyCODONE    IR 5 milliGRAM(s) Oral once  pantoprazole    Tablet 40 milliGRAM(s) Oral before breakfast      Vitals:  Vital Signs Last 24 Hrs  T(C): 36.4 (29 Aug 2023 06:45), Max: 37 (28 Aug 2023 18:30)  T(F): 97.5 (29 Aug 2023 06:45), Max: 98.6 (28 Aug 2023 18:30)  HR: 73 (29 Aug 2023 06:45) (67 - 73)  BP: 146/55 (29 Aug 2023 06:45) (120/57 - 146/55)  BP(mean): --  RR: 18 (29 Aug 2023 06:45) (17 - 18)  SpO2: 100% (29 Aug 2023 06:45) (100% - 100%)    Parameters below as of 29 Aug 2023 06:45  Patient On (Oxygen Delivery Method): room air        Daily     Daily Weight in k.9 (29 Aug 2023 06:45)    I&O's Detail    28 Aug 2023 07:01  -  29 Aug 2023 07:00  --------------------------------------------------------  IN:    Oral Fluid: 400 mL  Total IN: 400 mL    OUT:    Voided (mL): 2 mL  Total OUT: 2 mL    Total NET: 398 mL      29 Aug 2023 07:01  -  29 Aug 2023 11:34  --------------------------------------------------------  IN:    Oral Fluid: 160 mL  Total IN: 160 mL    OUT:  Total OUT: 0 mL    Total NET: 160 mL          Physical Exam:     General: No distress. Comfortable.  HEENT: EOM intact.  Neck: Neck supple. JVP not elevated. No masses  Chest: Clear to auscultation bilaterally  CV: Normal S1 and S2. No murmurs, rub, or gallops. Radial pulses normal. No LE edema and is warm b/l.   Abdomen: Soft, non-distended, non-tender  Skin: No rashes or skin breakdown  Neurology: Alert and oriented times three. Sensation intact  Psych: Affect normal    Labs:                        8.2    8.13  )-----------( 158      ( 29 Aug 2023 05:56 )             24.0     0829    127<L>  |  86<L>  |  77<H>  ----------------------------<  171<H>  4.6   |  25  |  3.32<H>    Ca    9.1      29 Aug 2023 05:56  Phos  4.7       Mg     1.70         TPro  7.5  /  Alb  3.8  /  TBili  1.0  /  DBili  x   /  AST  34<H>  /  ALT  34<H>  /  AlkPhos  560<H>

## 2023-08-30 ENCOUNTER — TRANSCRIPTION ENCOUNTER (OUTPATIENT)
Age: 58
End: 2023-08-30

## 2023-08-30 VITALS
OXYGEN SATURATION: 97 % | DIASTOLIC BLOOD PRESSURE: 53 MMHG | TEMPERATURE: 97 F | RESPIRATION RATE: 17 BRPM | SYSTOLIC BLOOD PRESSURE: 123 MMHG | HEART RATE: 66 BPM

## 2023-08-30 LAB
ALBUMIN SERPL ELPH-MCNC: 3.8 G/DL — SIGNIFICANT CHANGE UP (ref 3.3–5)
ALP SERPL-CCNC: 523 U/L — HIGH (ref 40–120)
ALT FLD-CCNC: 15 U/L — SIGNIFICANT CHANGE UP (ref 4–33)
ANION GAP SERPL CALC-SCNC: 15 MMOL/L — HIGH (ref 7–14)
ANION GAP SERPL CALC-SCNC: 16 MMOL/L — HIGH (ref 7–14)
ANION GAP SERPL CALC-SCNC: 19 MMOL/L — HIGH (ref 7–14)
ANISOCYTOSIS BLD QL: SLIGHT — SIGNIFICANT CHANGE UP
AST SERPL-CCNC: 26 U/L — SIGNIFICANT CHANGE UP (ref 4–32)
BASOPHILS # BLD AUTO: 0 K/UL — SIGNIFICANT CHANGE UP (ref 0–0.2)
BASOPHILS NFR BLD AUTO: 0 % — SIGNIFICANT CHANGE UP (ref 0–2)
BILIRUB SERPL-MCNC: 0.4 MG/DL — SIGNIFICANT CHANGE UP (ref 0.2–1.2)
BUN SERPL-MCNC: 76 MG/DL — HIGH (ref 7–23)
BUN SERPL-MCNC: 77 MG/DL — HIGH (ref 7–23)
BUN SERPL-MCNC: 79 MG/DL — HIGH (ref 7–23)
CALCIUM SERPL-MCNC: 8.9 MG/DL — SIGNIFICANT CHANGE UP (ref 8.4–10.5)
CALCIUM SERPL-MCNC: 9.4 MG/DL — SIGNIFICANT CHANGE UP (ref 8.4–10.5)
CALCIUM SERPL-MCNC: 9.5 MG/DL — SIGNIFICANT CHANGE UP (ref 8.4–10.5)
CHLORIDE SERPL-SCNC: 89 MMOL/L — LOW (ref 98–107)
CHLORIDE SERPL-SCNC: 91 MMOL/L — LOW (ref 98–107)
CHLORIDE SERPL-SCNC: 92 MMOL/L — LOW (ref 98–107)
CO2 SERPL-SCNC: 21 MMOL/L — LOW (ref 22–31)
CO2 SERPL-SCNC: 23 MMOL/L — SIGNIFICANT CHANGE UP (ref 22–31)
CO2 SERPL-SCNC: 23 MMOL/L — SIGNIFICANT CHANGE UP (ref 22–31)
CREAT SERPL-MCNC: 3.28 MG/DL — HIGH (ref 0.5–1.3)
CREAT SERPL-MCNC: 3.3 MG/DL — HIGH (ref 0.5–1.3)
CREAT SERPL-MCNC: 3.31 MG/DL — HIGH (ref 0.5–1.3)
EGFR: 16 ML/MIN/1.73M2 — LOW
EOSINOPHIL # BLD AUTO: 0.84 K/UL — HIGH (ref 0–0.5)
EOSINOPHIL NFR BLD AUTO: 9.5 % — HIGH (ref 0–6)
GLUCOSE SERPL-MCNC: 154 MG/DL — HIGH (ref 70–99)
GLUCOSE SERPL-MCNC: 155 MG/DL — HIGH (ref 70–99)
GLUCOSE SERPL-MCNC: 266 MG/DL — HIGH (ref 70–99)
HCT VFR BLD CALC: 24.5 % — LOW (ref 34.5–45)
HGB BLD-MCNC: 8.2 G/DL — LOW (ref 11.5–15.5)
HYPOCHROMIA BLD QL: SLIGHT — SIGNIFICANT CHANGE UP
IANC: 6.62 K/UL — SIGNIFICANT CHANGE UP (ref 1.8–7.4)
LYMPHOCYTES # BLD AUTO: 0.91 K/UL — LOW (ref 1–3.3)
LYMPHOCYTES # BLD AUTO: 10.3 % — LOW (ref 13–44)
MACROCYTES BLD QL: SLIGHT — SIGNIFICANT CHANGE UP
MAGNESIUM SERPL-MCNC: 1.7 MG/DL — SIGNIFICANT CHANGE UP (ref 1.6–2.6)
MAGNESIUM SERPL-MCNC: 1.8 MG/DL — SIGNIFICANT CHANGE UP (ref 1.6–2.6)
MANUAL SMEAR VERIFICATION: SIGNIFICANT CHANGE UP
MCHC RBC-ENTMCNC: 26.7 PG — LOW (ref 27–34)
MCHC RBC-ENTMCNC: 33.5 GM/DL — SIGNIFICANT CHANGE UP (ref 32–36)
MCV RBC AUTO: 79.8 FL — LOW (ref 80–100)
MICROCYTES BLD QL: SLIGHT — SIGNIFICANT CHANGE UP
MONOCYTES # BLD AUTO: 0.23 K/UL — SIGNIFICANT CHANGE UP (ref 0–0.9)
MONOCYTES NFR BLD AUTO: 2.6 % — SIGNIFICANT CHANGE UP (ref 2–14)
NEUTROPHILS # BLD AUTO: 6.85 K/UL — SIGNIFICANT CHANGE UP (ref 1.8–7.4)
NEUTROPHILS NFR BLD AUTO: 75 % — SIGNIFICANT CHANGE UP (ref 43–77)
NEUTS BAND # BLD: 2.6 % — SIGNIFICANT CHANGE UP (ref 0–6)
PHOSPHATE SERPL-MCNC: 4.5 MG/DL — SIGNIFICANT CHANGE UP (ref 2.5–4.5)
PHOSPHATE SERPL-MCNC: 4.6 MG/DL — HIGH (ref 2.5–4.5)
PLAT MORPH BLD: NORMAL — SIGNIFICANT CHANGE UP
PLATELET # BLD AUTO: 172 K/UL — SIGNIFICANT CHANGE UP (ref 150–400)
PLATELET COUNT - ESTIMATE: NORMAL — SIGNIFICANT CHANGE UP
POIKILOCYTOSIS BLD QL AUTO: SLIGHT — SIGNIFICANT CHANGE UP
POLYCHROMASIA BLD QL SMEAR: SIGNIFICANT CHANGE UP
POTASSIUM SERPL-MCNC: 4 MMOL/L — SIGNIFICANT CHANGE UP (ref 3.5–5.3)
POTASSIUM SERPL-MCNC: 4.1 MMOL/L — SIGNIFICANT CHANGE UP (ref 3.5–5.3)
POTASSIUM SERPL-MCNC: 4.2 MMOL/L — SIGNIFICANT CHANGE UP (ref 3.5–5.3)
POTASSIUM SERPL-SCNC: 4 MMOL/L — SIGNIFICANT CHANGE UP (ref 3.5–5.3)
POTASSIUM SERPL-SCNC: 4.1 MMOL/L — SIGNIFICANT CHANGE UP (ref 3.5–5.3)
POTASSIUM SERPL-SCNC: 4.2 MMOL/L — SIGNIFICANT CHANGE UP (ref 3.5–5.3)
PROT SERPL-MCNC: 7.7 G/DL — SIGNIFICANT CHANGE UP (ref 6–8.3)
RBC # BLD: 3.07 M/UL — LOW (ref 3.8–5.2)
RBC # FLD: 13.9 % — SIGNIFICANT CHANGE UP (ref 10.3–14.5)
RBC BLD AUTO: ABNORMAL
SODIUM SERPL-SCNC: 126 MMOL/L — LOW (ref 135–145)
SODIUM SERPL-SCNC: 130 MMOL/L — LOW (ref 135–145)
SODIUM SERPL-SCNC: 133 MMOL/L — LOW (ref 135–145)
SPHEROCYTES BLD QL SMEAR: SLIGHT — SIGNIFICANT CHANGE UP
WBC # BLD: 8.83 K/UL — SIGNIFICANT CHANGE UP (ref 3.8–10.5)
WBC # FLD AUTO: 8.83 K/UL — SIGNIFICANT CHANGE UP (ref 3.8–10.5)

## 2023-08-30 PROCEDURE — 99239 HOSP IP/OBS DSCHRG MGMT >30: CPT

## 2023-08-30 PROCEDURE — 99233 SBSQ HOSP IP/OBS HIGH 50: CPT | Mod: GC

## 2023-08-30 PROCEDURE — 99232 SBSQ HOSP IP/OBS MODERATE 35: CPT

## 2023-08-30 RX ORDER — ISOSORBIDE DINITRATE 5 MG/1
1 TABLET ORAL
Qty: 90 | Refills: 0
Start: 2023-08-30 | End: 2023-09-28

## 2023-08-30 RX ORDER — DARBEPOETIN ALFA IN POLYSORBAT 200MCG/0.4
60 PEN INJECTOR (ML) SUBCUTANEOUS ONCE
Refills: 0 | Status: COMPLETED | OUTPATIENT
Start: 2023-08-30 | End: 2023-08-30

## 2023-08-30 RX ORDER — AMLODIPINE BESYLATE 2.5 MG/1
1 TABLET ORAL
Refills: 0 | DISCHARGE

## 2023-08-30 RX ORDER — BUMETANIDE 0.25 MG/ML
2 INJECTION INTRAMUSCULAR; INTRAVENOUS
Refills: 0 | Status: DISCONTINUED | OUTPATIENT
Start: 2023-08-30 | End: 2023-08-30

## 2023-08-30 RX ORDER — LEVOFLOXACIN 5 MG/ML
1 INJECTION, SOLUTION INTRAVENOUS
Qty: 1 | Refills: 0
Start: 2023-08-30 | End: 2023-08-30

## 2023-08-30 RX ORDER — BUMETANIDE 0.25 MG/ML
1 INJECTION INTRAMUSCULAR; INTRAVENOUS
Qty: 60 | Refills: 0
Start: 2023-08-30 | End: 2023-09-28

## 2023-08-30 RX ORDER — NIFEDIPINE 30 MG
1 TABLET, EXTENDED RELEASE 24 HR ORAL
Qty: 30 | Refills: 0 | DISCHARGE
Start: 2023-08-30 | End: 2023-09-28

## 2023-08-30 RX ORDER — SODIUM CHLORIDE 5 G/100ML
500 INJECTION, SOLUTION INTRAVENOUS
Refills: 0 | Status: DISCONTINUED | OUTPATIENT
Start: 2023-08-30 | End: 2023-08-30

## 2023-08-30 RX ORDER — NIFEDIPINE 30 MG
1 TABLET, EXTENDED RELEASE 24 HR ORAL
Qty: 30 | Refills: 0
Start: 2023-08-30 | End: 2023-09-28

## 2023-08-30 RX ADMIN — Medication 100 MILLIGRAM(S): at 05:39

## 2023-08-30 RX ADMIN — Medication 2 UNIT(S): at 08:35

## 2023-08-30 RX ADMIN — HEPARIN SODIUM 5000 UNIT(S): 5000 INJECTION INTRAVENOUS; SUBCUTANEOUS at 05:38

## 2023-08-30 RX ADMIN — Medication 60 MICROGRAM(S): at 17:54

## 2023-08-30 RX ADMIN — Medication 60 MILLIGRAM(S): at 05:39

## 2023-08-30 RX ADMIN — Medication 100 MILLIGRAM(S): at 17:54

## 2023-08-30 RX ADMIN — ISOSORBIDE DINITRATE 10 MILLIGRAM(S): 5 TABLET ORAL at 05:38

## 2023-08-30 RX ADMIN — Medication 125 MICROGRAM(S): at 05:39

## 2023-08-30 RX ADMIN — Medication 3: at 13:20

## 2023-08-30 RX ADMIN — Medication 100 MILLIGRAM(S): at 13:27

## 2023-08-30 RX ADMIN — Medication 2 UNIT(S): at 13:21

## 2023-08-30 RX ADMIN — Medication 1: at 08:36

## 2023-08-30 RX ADMIN — SODIUM CHLORIDE 30 MILLILITER(S): 5 INJECTION, SOLUTION INTRAVENOUS at 01:05

## 2023-08-30 RX ADMIN — ISOSORBIDE DINITRATE 10 MILLIGRAM(S): 5 TABLET ORAL at 13:27

## 2023-08-30 RX ADMIN — PANTOPRAZOLE SODIUM 40 MILLIGRAM(S): 20 TABLET, DELAYED RELEASE ORAL at 05:39

## 2023-08-30 NOTE — PROGRESS NOTE ADULT - PROBLEM SELECTOR PROBLEM 4
CKD (chronic kidney disease)
Pulmonary HTN
CKD (chronic kidney disease)
CKD (chronic kidney disease)
Pulmonary HTN
CKD (chronic kidney disease)
Pulmonary HTN
CKD (chronic kidney disease)

## 2023-08-30 NOTE — PROGRESS NOTE ADULT - PROBLEM SELECTOR PLAN 1
#Acute hypoxic respiratory failure - resolved s/p BIPAP, now on oxgen via NC, will wean as tolerated  - TTE 8/6/23 with LVEF 69%, mod-sev TR, severe pulmonary HTN, BNP 6228  - Given the discrepancy between LV function and class 2 pulm HTN, consulted cardio - appreciate input  - Strict I/O, daily weight. Pt has had multiple admissions for CHF, please document dry weight upon discharge  Repeat CXR 8/14 shows mild b/l pleural effusions.   - course was complicated by worsening fluid status, renal failure, hence IV bumex was switched to gtt  - s/p RHC 8/28, elevated PCWP, will need ongoing diuresis, f/u HF recs  - Appreciate ongoing nephrology, cardiology, heart failure input, f/u further recs  - Elevated transaminases likely due to congestive hepatopathy, will monitor

## 2023-08-30 NOTE — PROGRESS NOTE ADULT - PROBLEM SELECTOR PLAN 3
SNa 127 and received 3% HTS. Rpt SNa 126 and given additional 3% HTS. Pending rpt labs this AM. Continue on bumex gtt. Monitor labs closely.        If you have any questions, please feel free to contact me  Chay Koehler  Nephrology Fellow  [employer]/Page [phone]  (After 5pm or on weekends please page the on-call fellow) Cimzia Pregnancy And Lactation Text: This medication crosses the placenta but can be considered safe in certain situations. Cimzia may be excreted in breast milk.

## 2023-08-30 NOTE — PROGRESS NOTE ADULT - PROBLEM SELECTOR PLAN 10
Diet: CC  DVT proph: heparin subQ for DVT ppx  Dispo: Likely home      Pending improvement and stability in Cr, establishment of stable diuretic regimen, CHF input

## 2023-08-30 NOTE — PROGRESS NOTE ADULT - SUBJECTIVE AND OBJECTIVE BOX
[facility] Division of Hospital Medicine  Bobo Hart MD  Pager [phone]      Patient is a 58y old  Female who presents with a chief complaint of CHF Exacerbation (30 Aug 2023 09:49)      SUBJECTIVE / OVERNIGHT EVENTS: Pt endorses improved breathing and can lay flay. Improved wrist swelling, no fever or chills    MEDICATIONS  (STANDING):  atorvastatin 40 milliGRAM(s) Oral at bedtime  ceFAZolin   IVPB      ceFAZolin   IVPB 1000 milliGRAM(s) IV Intermittent every 12 hours  darbepoetin Injectable ViaL 60 MICROGram(s) SubCutaneous once  dextrose 5%. 1000 milliLiter(s) (100 mL/Hr) IV Continuous <Continuous>  dextrose 5%. 1000 milliLiter(s) (50 mL/Hr) IV Continuous <Continuous>  dextrose 50% Injectable 25 Gram(s) IV Push once  dextrose 50% Injectable 12.5 Gram(s) IV Push once  dextrose 50% Injectable 25 Gram(s) IV Push once  glucagon  Injectable 1 milliGRAM(s) IntraMuscular once  heparin   Injectable 5000 Unit(s) SubCutaneous every 12 hours  hydrALAZINE 100 milliGRAM(s) Oral every 8 hours  insulin lispro (ADMELOG) corrective regimen sliding scale   SubCutaneous three times a day before meals  insulin lispro (ADMELOG) corrective regimen sliding scale   SubCutaneous at bedtime  insulin lispro Injectable (ADMELOG) 2 Unit(s) SubCutaneous three times a day before meals  isosorbide   dinitrate Tablet (ISORDIL) 10 milliGRAM(s) Oral three times a day  levothyroxine 125 MICROGram(s) Oral daily  NIFEdipine XL 60 milliGRAM(s) Oral daily  oxyCODONE    IR 5 milliGRAM(s) Oral once  pantoprazole    Tablet 40 milliGRAM(s) Oral before breakfast  sodium chloride 3%. 500 milliLiter(s) (30 mL/Hr) IV Continuous <Continuous>  sodium chloride 3%. 500 milliLiter(s) (30 mL/Hr) IV Continuous <Continuous>    MEDICATIONS  (PRN):  acetaminophen     Tablet .. 650 milliGRAM(s) Oral every 6 hours PRN Mild Pain (1 - 3), Moderate Pain (4 - 6)  dextrose Oral Gel 15 Gram(s) Oral once PRN Blood Glucose LESS THAN 70 milliGRAM(s)/deciliter  guaiFENesin Oral Liquid (Sugar-Free) 200 milliGRAM(s) Oral every 6 hours PRN Cough      CAPILLARY BLOOD GLUCOSE      POCT Blood Glucose.: 293 mg/dL (30 Aug 2023 12:28)  POCT Blood Glucose.: 186 mg/dL (30 Aug 2023 08:34)  POCT Blood Glucose.: 305 mg/dL (29 Aug 2023 22:14)  POCT Blood Glucose.: 236 mg/dL (29 Aug 2023 17:50)    I&O's Summary    29 Aug 2023 07:01  -  30 Aug 2023 07:00  --------------------------------------------------------  IN: 840 mL / OUT: 0 mL / NET: 840 mL    30 Aug 2023 07:01  -  30 Aug 2023 13:44  --------------------------------------------------------  IN: 0 mL / OUT: 200 mL / NET: -200 mL        PHYSICAL EXAM:  Vital Signs Last 24 Hrs  T(C): 36.3 (30 Aug 2023 13:25), Max: 36.4 (29 Aug 2023 22:12)  T(F): 97.4 (30 Aug 2023 13:25), Max: 97.6 (29 Aug 2023 22:12)  HR: 66 (30 Aug 2023 13:25) (66 - 75)  BP: 123/53 (30 Aug 2023 13:25) (122/74 - 132/62)  BP(mean): --  RR: 17 (30 Aug 2023 13:25) (17 - 17)  SpO2: 97% (30 Aug 2023 13:25) (97% - 100%)    Parameters below as of 30 Aug 2023 13:25  Patient On (Oxygen Delivery Method): room air      CONSTITUTIONAL: NAD  EYES: conjunctiva and sclera clear  ENMT: mmm  NECK: Supple,  RESPIRATORY: Normal respiratory effort; lungs are clear to auscultation bilaterally  CARDIOVASCULAR: Regular rate and rhythm, + S1 and S2  ABDOMEN: Nontender to palpation, normoactive bowel sounds, no rebound/guarding  PSYCH: A+O x 3  Skin: improved left wrist erythema and swelling.    LABS:                        8.2    8.83  )-----------( 172      ( 30 Aug 2023 09:18 )             24.5     08-30    130<L>  |  92<L>  |  76<H>  ----------------------------<  155<H>  4.0   |  23  |  3.31<H>    Ca    9.4      30 Aug 2023 09:18  Phos  4.5     08-30  Mg     1.70     08-30    TPro  7.7  /  Alb  3.8  /  TBili  0.4  /  DBili  x   /  AST  26  /  ALT  15  /  AlkPhos  523<H>  08-30          Urinalysis Basic - ( 30 Aug 2023 09:18 )    Color: x / Appearance: x / SG: x / pH: x  Gluc: 155 mg/dL / Ketone: x  / Bili: x / Urobili: x   Blood: x / Protein: x / Nitrite: x   Leuk Esterase: x / RBC: x / WBC x   Sq Epi: x / Non Sq Epi: x / Bacteria: x

## 2023-08-30 NOTE — PROGRESS NOTE ADULT - NS ATTEND AMEND GEN_ALL_CORE FT
Hypertonic saline to be given for hyponatremia (per nephrology note). May help with diuresis.  Continue Bumex gtt.  If hyponatremia persists, could also use tolvaptan.
RHC data noted. High RA and PCWP with high CI (~4), possibly due to anemia/CKD.  Hgb 6.4; now s/p PRBC transfusion.  Continue Bumex gtt.
Briefly, 59 y/o F w/ h/o HTN, HL, DM2 (A1c 6.3 6/23), hypothyroid c/b myxedema coma, HFpEF, CKD (b/l Cr 2) who presented on 8/12 with shortness of breath and orthopnea where was volume overloaded and received diuretics. Of note has had several hospitalizations in the past 2 years. Reports dyspnea with minimal exertion. Was seen by HF on 8/16 for possible CardioMEMs but patient was reluctant to consider now. Since, had been on oral diuretics and noted to have worsening renal function along with worsenign dyspnea. Weights have downtrended. On exam, JVP elevated, RRR, crackles b/l, nontender abdomen, b/l pitting edema 1+. Labs reviewed - BUN/Cr 73/2.93 (uptrending), Hb 7.5, Kappa/lambda wnl, SPEP 1/23 nl. TTE reviewed - nl EF, mod LA dilatation, mod-severe TR, nl RV size/function, RVSP 65. RHC 1/23 RA 17, PA 58/27/41, PCWP22, CO/CI 8.8/5.3. Overall stage C HF, NYHA class III with volume overload and worsenign renal dysfunction.  - encouraged patient to consider CardioMEMs  - increase bumex to 3 mg IV q12  - add isordil 10 mg q8h  - c/w hydral 100 mg q8h  - check BNP
Briefly, 59 y/o F w/ h/o HTN, HL, DM2 (A1c 6.3 6/23), hypothyroid c/b myxedema coma, HFpEF, CKD (b/l Cr 2) who presented on 8/12 with shortness of breath and orthopnea where was volume overloaded and received diuretics. Of note has had several hospitalizations in the past 2 years. Reports dyspnea with minimal exertion. Was seen by HF on 8/16 for possible CardioMEMs but patient was reluctant to consider now. Since, had been on oral diuretics and noted to have worsening renal function along with worsening dyspnea. Weights have downtrended. Started on IV bumex. On exam, JVP elevated, RRR, CTAB, nontender abdomen, trace edema. Labs not sent - BUN/Cr 73/2.93 (uptrending), Hb 7.5, Kappa/lambda wnl, SPEP 1/23 nl. TTE reviewed - nl EF, mod LA dilatation, mod-severe TR, nl RV size/function, RVSP 65. RHC 1/23 RA 17, PA 58/27/41, PCWP22, CO/CI 8.8/5.3. Overall stage C HF, NYHA class III with volume overload and worsening renal dysfunction.  - encouraged patient to consider CardioMEMs but family and patient reluctant  - c/w bumex 3 mg IV q12  - c/w isordil 10 mg q8h  - c/w hydral 100 mg q8h  - plan for RHC Monday
Change Bumex 2 mg po bid.  D/c planning for today or tomorrow.  She can f/u in 1 week.

## 2023-08-30 NOTE — PROGRESS NOTE ADULT - PROBLEM SELECTOR PLAN 7
- On repaglinide at home  - ISS here  - A1c 6.3 in June. Outpatient fructosamine corresponds to A1c 7.8
Uncontrolled HTN  - Nifedipine 60mg qd, can uptitrate as needed   - Continue hydral 100 q8
- On repaglinide at home  - ISS here  - A1c 6.3 in June. Outpatient fructosamine corresponds to A1c 7.8
- Admission earlier this month for symptomatic anemia, given 1U PRBC  - Prior iron studies demonstrate elevated ferritin, consistent with anemia of chronic disease with suspected component of anemia of CKD  - Hgb downtrended will transfuse prbc, over 6 hours to avoid fluid overload  - F/u with repeat CBC post-transfusion  - Transfuse if Hgb <7
- On repaglinide at home  - ISS here  - A1c 6.3 in June. Outpatient fructosamine corresponds to A1c 7.8
- On repaglinide at home  - ISS here  - A1c 6.3 in June. Outpatient fructosamine corresponds to A1c 7.8
- Admission earlier this month for symptomatic anemia, given 1U PRBC  - Prior iron studies demonstrate elevated ferritin, consistent with anemia of chronic disease with suspected component of anemia of CKD  - Hgb downtrended will transfuse prbc, over 6 hours to avoid fluid overload  - F/u with repeat CBC post-transfusion  - Transfuse if Hgb <7
- On repaglinide at home  - ISS here  - A1c 6.3 in June. Outpatient fructosamine corresponds to A1c 7.8
- Admission earlier this month for symptomatic anemia, given 1U PRBC  - Prior iron studies demonstrate elevated ferritin, consistent with anemia of chronic disease with suspected component of anemia of CKD  - Hgb downtrended will transfuse prbc, over 6 hours to avoid fluid overload  - F/u with repeat CBC post-transfusion  - Transfuse if Hgb <7

## 2023-08-30 NOTE — PROGRESS NOTE ADULT - ASSESSMENT
58 year old Female with PMH of  HTN, HLD, DM2, asthma, CKD (baseline SCr ~2.0), hypothyroidism c/b myxedema coma in past), anemia requiring recent transfusions (followed by GI with plan for scope), and HFpEF with severe pulmonary hypertension (EF 64%;LVIDd 4.6 RVE with normal RV function, mod-severe TR and severe PH RVSP~65). Patient presented with c/o progressive worsening SOB/MARKS and 3 pillow orthopnea X 5days; treated in the ED with IV Lasix 40mg followed by IV Bumex 2mg daily on 8/12 and 8/13.        HF Consult request for diuretic management and CardioMEMS implant (discussed/reviewed with patient and booklet provided, she will consider as OP)         Pertinent Labs: BNP  6, 450     Lactate 1.0    Troponin levels    51/48    BUN/Cr  66/2.3    TSH: 4.8  T3/T4  56/1.9            K/L  (1/2023)  8.9/3.75 ratio 2.35    CXR: Mild bilateral pleural effusions    EKG:  Normal sinus rhythm, possible Left atrial enlargement    TTE: Normal LV systolic function, Right ventricular enlargement with normal RV systolic function, Moderate-severe TR and PASP equals 65 consistent with PH.    RHC (1/2023):  RA 17 PA 58/27/41  PCWP  22 CO/CI 8.8/5.3  and PVR 2.16 POWERS       Home Meds:   Bumex 1mg BID    Hydralazine 100mg Q8H   Amlodipine 5mg daily    58 year old Female with PMH of  HTN, HLD, DM2, asthma, CKD (baseline SCr ~2.0), hypothyroidism c/b myxedema coma in past), anemia requiring recent transfusions (followed by GI with plan for scope), and HFpEF with severe pulmonary hypertension (EF 64%;LVIDd 4.6 RVE with normal RV function, mod-severe TR and severe PH RVSP~65). Patient presented with c/o progressive worsening SOB/MARKS and 3 pillow orthopnea X 5days; treated in the ED with IV Lasix 40mg followed by IV Bumex 2mg daily on 8/12 and 8/13.        HF Consult request for diuretic management and CardioMEMS implant (discussed/reviewed with patient and booklet provided, she will consider as OP)         Pertinent Labs: BNP  6, 450     Lactate 1.0    Troponin levels    51/48    BUN/Cr  66/2.3    TSH: 4.8  T3/T4  56/1.9            K/L  (1/2023)  8.9/3.75 ratio 2.35  CXR: Mild bilateral pleural effusions  EKG:  Normal sinus rhythm, possible Left atrial enlargement  TTE: Normal LV systolic function, Right ventricular enlargement with normal RV systolic function, Moderate-severe TR and PASP equals 65 consistent with PH.  RHC (1/2023):  RA 17 PA 58/27/41  PCWP  22 CO/CI 8.8/5.3  and PVR 2.16 POWERS       Home Meds:   Bumex 1mg BID    Hydralazine 100mg Q8H   Amlodipine 5mg daily

## 2023-08-30 NOTE — PROGRESS NOTE ADULT - PROVIDER SPECIALTY LIST ADULT
Heart Failure
Cardiology
Heart Failure
Nephrology
Heart Failure
Heart Failure
Nephrology
Heart Failure
Hospitalist
Nephrology
Hospitalist
Nephrology
Hospitalist
Nephrology
Nephrology
Hospitalist
Hospitalist
Nephrology
Hospitalist
Hospitalist
Internal Medicine
Hospitalist
Internal Medicine
Hospitalist

## 2023-08-30 NOTE — PROGRESS NOTE ADULT - PROBLEM SELECTOR PROBLEM 1
Acute kidney injury superimposed on CKD
Acute kidney injury superimposed on CKD
Acute on chronic diastolic congestive heart failure
Acute on chronic diastolic congestive heart failure
Acute kidney injury superimposed on CKD
Acute on chronic diastolic congestive heart failure
Acute kidney injury superimposed on CKD
Acute kidney injury superimposed on CKD
Acute on chronic diastolic congestive heart failure
Acute kidney injury superimposed on CKD
Acute on chronic diastolic congestive heart failure
Acute kidney injury superimposed on CKD
Acute kidney injury superimposed on CKD
Acute on chronic diastolic congestive heart failure
Acute kidney injury superimposed on CKD
Acute on chronic diastolic congestive heart failure
Acute on chronic diastolic congestive heart failure
Acute kidney injury superimposed on CKD
Acute kidney injury superimposed on CKD
Acute on chronic diastolic congestive heart failure
Acute on chronic diastolic congestive heart failure
Acute kidney injury superimposed on CKD
Acute kidney injury superimposed on CKD
Acute on chronic diastolic congestive heart failure
Acute on chronic right-sided congestive heart failure
Acute on chronic diastolic congestive heart failure
Acute on chronic diastolic congestive heart failure
Acute on chronic right-sided congestive heart failure
Acute on chronic diastolic congestive heart failure
Acute on chronic right-sided congestive heart failure

## 2023-08-30 NOTE — PROGRESS NOTE ADULT - ATTENDING COMMENTS
Agree with fellow's assessment above.    Jw Trujillo MD  Attending Physician   Cardiology Inpatient Consult Service     [facility] [place]   [address], [address]  [place] [address]   Tel: [phone]  Fax: [phone]    Please check [email or web address]: [email or web address]" for cardiology service schedule and contact information.
agree with fellow's assessment.   continue on oral diuretic
agree with fellows assessment
1. HTN  2. HLD  3. Acute on chroonic HFpEF: improving.   4. Severe pulm HTN   5. ISAMAR     Hold diuresis in setting of ISAMAR   Dyspnea likely multifactorial - improved.   Outpatient pharm stress for evaluation of ischemia
58 year old woman with acute on chronic diastolic heart failure presents with progressive fluid overload  Continue Bumex gtt  Continue isordil and hydralazine  For RHC on Monday
agree with above. challenging volume status patient feels better lying flat.  d/w cardiology team.  hold diuretics for now.
Acute on CKD  HF improving   agree with RHC   resume diuretics   continue wit bicarbonate for acidosis   anemia. iron replete. needs GI and hem workup  s/p  DEA here 8/16. should be once weekly   avoid contrast studies, ACE-I, ARB, or NSAIDs   Further detailed plan of management and recommendation as outlined above.
Acute on CKD  HF worse today  increase diuretics to BID. given extra Bumex 2mg IV today   continue wit bicarbonate for acidosis   anemia. iron replete. needs GI and hem workup  s/p  DEA here 8/16. should be once weekly   avoid contrast studies, ACE-I, ARB, or NSAIDs   Further detailed plan of management and recommendation as outlined above.
Patient examined and ROS reviewed. A case of  ISAMAR on CKD with hyperkalemia, acidosis, and anemia in the setting of HF exacerbation. Patient is being diuresed with IV BUMEX. Advised to monitor electrolytes. Hyperkalemia and acidosis  are resolving.
patient examined earlier this am during rounds.   Acute on CKD. cardiorenal syndrome   still with HF on exam   increase diuretics as outline above   continue with bicarbonate for acidosis   anemia. iron replete.  s/p  DEA here 8/16. should be once weekly   avoid contrast studies, ACE-I, ARB, or NSAIDs   Further detailed plan of management and recommendation as outlined above.
patient examined earlier this am during rounds.   Acute on CKD. cardiorenal syndrome with pulmonary HTN   still with HF on exam   increase diuretics as per Hf team.   continue with bicarbonate for acidosis   anemia. iron replete.  s/p  DEA here 8/16. should be once weekly . re-dose today( ordered)   avoid contrast studies, ACE-I, ARB, or NSAIDs   Further detailed plan of management and recommendation as outlined above.
A case of ISAMAR on CKD with hyponatremia, anemia and CHF. Patient is being diuresed with IV BUMEX and plan to  undergo right heart cath on Monday. Advised two hour hydration before dye infusion.
Acute on CKD  HF improving   agree with RHC   resume diuretics soon   continue wit bicarbonate for acidosis   anemia. iron replete. needs GI and hem workup  will start DEA here   avoid contrast studies, ACE-I, ARB, or NSAIDs   Further detailed plan of management and recommendation as outlined above.
Acute on CKD  still with HF on exam   increase diuretics as outline above   continue with bicarbonate for acidosis   anemia. iron replete.  s/p  DEA here 8/16. should be once weekly   avoid contrast studies, ACE-I, ARB, or NSAIDs   Further detailed plan of management and recommendation as outlined above.
Ali/CKD  continue Bumex   anemia worsening s/p DEA. consider blood transfusion   s/p RHC
ISAMAR on CKD  cardiorenal syndrome due to RHF and needs to be diuresed aggressively.   appreciate cards/HF excellent care and guidance in terms of her diuretics and RHC.   Na better  on exam she is close to euvolemic   will re-dose DEA today   no objection to DC   Renal service will sign off at this time. please re-consult if needed   Upon discharge please make appointment with Nephrology clinic. For scheduling please email Nephrology at [email or web address]
Patient examined and ROS reviewed. A case of ISAMAR on CKD with exacerbated CHF and hyponatremia. Patient is on IV BUMEX but weight is still increasing suggesting still more free water intake. Advised to decrease free water intake.
patient examined earlier this am during rounds.   Acute on CKD. cardiorenal syndrome with pulmonary HTN   increase diuretics as per Hf team.   continue with bicarbonate for acidosis   anemia. iron replete.  s/p  DEA here 8/16. 8/23. should be once weekly   avoid contrast studies, ACE-I, ARB, or NSAIDs   Further detailed plan of management and recommendation as outlined above.
Agree with fellows note above, now on oral diuresis.   Cont to monitor labs and Is/Os for now.
continue Bumex   add HTS to assist diuresis and hyponatremia

## 2023-08-30 NOTE — PROGRESS NOTE ADULT - PROBLEM SELECTOR PLAN 9
Diet: CC  DVT proph: heparin subQ for DVT ppx  Dispo: Likely home      Pending improvement and stability in Cr, establishment of stable diuretic regimen, CHF input  Pending [facility] 8/28
- On repaglinide at home  - ISS here  - A1c 6.3 in June. Outpatient fructosamine corresponds to A1c 7.8
- On repaglinide at home  - ISS here  - A1c 6.3 in June. Outpatient fructosamine corresponds to A1c 7.8
- Type 2 On repaglinide at home  - ISS here  - A1c 6.3 in June. Outpatient fructosamine corresponds to A1c 7.8

## 2023-08-30 NOTE — PROGRESS NOTE ADULT - PROBLEM SELECTOR PLAN 1
IV Bumex 1mg/hr  Hydralazine 100mg TID (hold SBP<90)  ISDN 10mg TID (hold SBP<90)  Strict I/O  Daily standing weights  Monitor lytes replete K>4.0 and Mg>2.0   Trend SCr  Management per primary team  Appreciate Nephrology recommendations  Pending final recommendations from HF attending IV Bumex 1mg/hr; Please discontinue and start PO Bumex    Hydralazine 100mg TID (hold SBP<90)  ISDN 10mg TID (hold SBP<90)  Strict I/O  Daily standing weights  Monitor lytes replete K>4.0 and Mg>2.0   Trend SCr  Management per primary team  Appreciate Nephrology recommendations  Discharge planning  Follow up appointment with Dr. Ospina on   Pending final recommendations from HF attending

## 2023-08-30 NOTE — CHART NOTE - NSCHARTNOTEFT_GEN_A_CORE
Patient w/ hyponatremia. Na of 127. received HTS 3% 30cc/hr x5 hours. BMP sp HTS w/ Na now 126. Nephro paged, awaiting recs. Patient w/ hyponatremia. Na of 127. received HTS 3% 30cc/hr x5 hours. BMP sp HTS w/ Na now 126. Cased discussed w/ Nephro Dr Sierra with reordered HTS 3% 30cc/hr x5 hours and repeat BMP 1 hour after intervention. Patient w/ hyponatremia. Na of 127. received HTS 3% 30cc/hr x5 hours. BMP sp HTS w/ Na now 126. Cased discussed w/ Nephro Dr Sierra will do additional HTS 3% at 30cc/hr x5 hours and repeat BMP 1 hour after intervention.

## 2023-08-30 NOTE — PROGRESS NOTE ADULT - SUBJECTIVE AND OBJECTIVE BOX
Interval History:  Patient resting comfortably in bed   Denies CP/SOB/palpitations/dizziness  No acute events overnight    Medications:  acetaminophen     Tablet .. 650 milliGRAM(s) Oral every 6 hours PRN  atorvastatin 40 milliGRAM(s) Oral at bedtime  buMETAnide Infusion 1 mG/Hr IV Continuous <Continuous>  ceFAZolin   IVPB      ceFAZolin   IVPB 1000 milliGRAM(s) IV Intermittent every 12 hours  darbepoetin Injectable ViaL 60 MICROGram(s) SubCutaneous once  dextrose 5%. 1000 milliLiter(s) IV Continuous <Continuous>  dextrose 5%. 1000 milliLiter(s) IV Continuous <Continuous>  dextrose 50% Injectable 25 Gram(s) IV Push once  dextrose 50% Injectable 12.5 Gram(s) IV Push once  dextrose 50% Injectable 25 Gram(s) IV Push once  dextrose Oral Gel 15 Gram(s) Oral once PRN  glucagon  Injectable 1 milliGRAM(s) IntraMuscular once  guaiFENesin Oral Liquid (Sugar-Free) 200 milliGRAM(s) Oral every 6 hours PRN  heparin   Injectable 5000 Unit(s) SubCutaneous every 12 hours  hydrALAZINE 100 milliGRAM(s) Oral every 8 hours  insulin lispro (ADMELOG) corrective regimen sliding scale   SubCutaneous three times a day before meals  insulin lispro (ADMELOG) corrective regimen sliding scale   SubCutaneous at bedtime  insulin lispro Injectable (ADMELOG) 2 Unit(s) SubCutaneous three times a day before meals  isosorbide   dinitrate Tablet (ISORDIL) 10 milliGRAM(s) Oral three times a day  levothyroxine 125 MICROGram(s) Oral daily  NIFEdipine XL 60 milliGRAM(s) Oral daily  oxyCODONE    IR 5 milliGRAM(s) Oral once  pantoprazole    Tablet 40 milliGRAM(s) Oral before breakfast  sodium chloride 3%. 500 milliLiter(s) IV Continuous <Continuous>  sodium chloride 3%. 500 milliLiter(s) IV Continuous <Continuous>      Vitals:  T(C): 36.3 (23 @ 05:34), Max: 36.4 (23 @ 22:12)  HR: 75 (23 @ 05:34) (68 - 75)  BP: 122/74 (23 @ 05:34) (122/74 - 133/59)  BP(mean): --  RR: 17 (23 @ 05:34) (17 - 18)  SpO2: 97% (23 @ 05:34) (97% - 100%)    Daily     Daily Weight in k.3 (30 Aug 2023 05:34)        I&O's Summary    29 Aug 2023 07:01  -  30 Aug 2023 07:00  --------------------------------------------------------  IN: 840 mL / OUT: 0 mL / NET: 840 mL        Physical Exam:  Appearance: No Acute Distress  Neck: JVP  Cardiovascular: Normal S1 S2  Respiratory: Clear to auscultation bilaterally  Gastrointestinal: Soft, Non-tender	  Skin: No cyanosis	  Neurologic: Non-focal  Extremities:  LE edema  Psychiatry: A & O x 3, Mood & affect appropriate    Labs:                        8.2    8.13  )-----------( 158      ( 29 Aug 2023 05:56 )             24.0         126<L>  |  89<L>  |  79<H>  ----------------------------<  266<H>  4.2   |  21<L>  |  3.28<H>    Ca    8.9      29 Aug 2023 23:39  Phos  4.7       Mg     1.70         TPro  7.5  /  Alb  3.8  /  TBili  1.0  /  DBili  x   /  AST  34<H>  /  ALT  34<H>  /  AlkPhos  560<H>        TELEMETRY:    Echocardiogram:  < from: Transthoracic Echocardiogram (23 @ 12:30) >  Dimensions:     Normal Values:  LA:     3.6 cm    2.0 - 4.0 cm  Ao:     2.2 cm    2.0 - 3.8 cm  SEPTUM: 0.7 cm    0.6 - 1.2 cm  PWT:    0.9 cm    0.6 - 1.1 cm  LVIDd:  4.6 cm    3.0 - 5.6 cm  LVIDs:  2.6 cm    1.8 - 4.0 cm  Derived Variables:  LVMI: 72 g/m2  RWT: 0.39  Fractional short: 43 %  Ejection Fraction (Modified Macedo Rule): 69 %  ------------------------------------------------------------------------  OBSERVATIONS:  Mitral Valve: Normal mitral valve. Minimal mitral  regurgitation.  Aortic Root: Normal aortic root.  Aortic Valve: Normal trileaflet aortic valve.  Left Atrium: Moderately dilated left atrium.  LA volume  index = 45 cc/m2.  Left Ventricle: Normal left ventricular systolic function.  No segmental wall motion abnormalities. Normal left  ventricular internal dimensions and wall thicknesses.  (DT:152 ms).  Right Heart: Normal right atrium. Right ventricular  enlargement with normal right ventricular systolic  function. Normal tricuspid valve.  Moderate-severe  tricuspid regurgitation. Normal pulmonic valve. Moderate  pulmonic regurgitation.  Pericardium/PleuraNormal pericardium with trace pericardial  effusion. Right pleural effusion.  Hemodynamic: Estimated right ventricular systolic pressure  equals 65 mm Hg, assuming right atrial pressure equals 10  mm Hg, consistent with severe pulmonary hypertension.  ------------------------------------------------------------------------  CONCLUSIONS:  1. Normal left ventricular systolic function. No segmental  wall motion abnormalities.  2. Right ventricular enlargement with normal right  ventricular systolic function.  3. Normal tricuspid valve.Moderate-severe tricuspid  regurgitation.  4. Estimated pulmonary artery systolic pressure equals 65  mm Hg, assuming right atrial pressure equals 10  mm Hg,  consistent with severe pulmonary hypertension.  *** Compared with echocardiogram of 2023, no  significant changes noted.       Interval History:  Patient resting comfortably in bed   Denies CP/SOB/palpitations/dizziness  No acute events overnight    Medications:  acetaminophen     Tablet .. 650 milliGRAM(s) Oral every 6 hours PRN  atorvastatin 40 milliGRAM(s) Oral at bedtime  buMETAnide Infusion 1 mG/Hr IV Continuous <Continuous>  ceFAZolin   IVPB      ceFAZolin   IVPB 1000 milliGRAM(s) IV Intermittent every 12 hours  darbepoetin Injectable ViaL 60 MICROGram(s) SubCutaneous once  dextrose 5%. 1000 milliLiter(s) IV Continuous <Continuous>  dextrose 5%. 1000 milliLiter(s) IV Continuous <Continuous>  dextrose 50% Injectable 25 Gram(s) IV Push once  dextrose 50% Injectable 12.5 Gram(s) IV Push once  dextrose 50% Injectable 25 Gram(s) IV Push once  dextrose Oral Gel 15 Gram(s) Oral once PRN  glucagon  Injectable 1 milliGRAM(s) IntraMuscular once  guaiFENesin Oral Liquid (Sugar-Free) 200 milliGRAM(s) Oral every 6 hours PRN  heparin   Injectable 5000 Unit(s) SubCutaneous every 12 hours  hydrALAZINE 100 milliGRAM(s) Oral every 8 hours  insulin lispro (ADMELOG) corrective regimen sliding scale   SubCutaneous three times a day before meals  insulin lispro (ADMELOG) corrective regimen sliding scale   SubCutaneous at bedtime  insulin lispro Injectable (ADMELOG) 2 Unit(s) SubCutaneous three times a day before meals  isosorbide   dinitrate Tablet (ISORDIL) 10 milliGRAM(s) Oral three times a day  levothyroxine 125 MICROGram(s) Oral daily  NIFEdipine XL 60 milliGRAM(s) Oral daily  oxyCODONE    IR 5 milliGRAM(s) Oral once  pantoprazole    Tablet 40 milliGRAM(s) Oral before breakfast  sodium chloride 3%. 500 milliLiter(s) IV Continuous <Continuous>  sodium chloride 3%. 500 milliLiter(s) IV Continuous <Continuous>      Vitals:  T(C): 36.3 (23 @ 05:34), Max: 36.4 (23 @ 22:12)  HR: 75 (23 @ 05:34) (68 - 75)  BP: 122/74 (23 @ 05:34) (122/74 - 133/59)  BP(mean): --  RR: 17 (23 @ 05:34) (17 - 18)  SpO2: 97% (23 @ 05:34) (97% - 100%)    Daily     Daily Weight in k.3 (30 Aug 2023 05:34)        I&O's Summary    29 Aug 2023 07:01  -  30 Aug 2023 07:00  --------------------------------------------------------  IN: 840 mL / OUT: 0 mL / NET: 840 mL        Physical Exam:  Appearance: No Acute Distress  Neck: JVP  Cardiovascular: Normal S1 S2  Respiratory: Clear to auscultation bilaterally  Gastrointestinal: Soft, Non-tender	  Skin: No cyanosis	  Neurologic: Non-focal  Extremities:  LE edema  Psychiatry: A & O x 3, Mood & affect appropriate    Labs:                        8.2    8.13  )-----------( 158      ( 29 Aug 2023 05:56 )             24.0         126<L>  |  89<L>  |  79<H>  ----------------------------<  266<H>  4.2   |  21<L>  |  3.28<H>    Ca    8.9      29 Aug 2023 23:39  Phos  4.7       Mg     1.70         TPro  7.5  /  Alb  3.8  /  TBili  1.0  /  DBili  x   /  AST  34<H>  /  ALT  34<H>  /  AlkPhos  560<H>        TELEMETRY: Sinus Rhythm     Echocardiogram:  Transthoracic Echocardiogram (23 @ 12:30)     Dimensions:     Normal Values:  LA:     3.6 cm    2.0 - 4.0 cm  Ao:     2.2 cm    2.0 - 3.8 cm  SEPTUM: 0.7 cm    0.6 - 1.2 cm  PWT:    0.9 cm    0.6 - 1.1 cm  LVIDd:  4.6 cm    3.0 - 5.6 cm  LVIDs:  2.6 cm    1.8 - 4.0 cm  Derived Variables:  LVMI: 72 g/m2  RWT: 0.39  Fractional short: 43 %  Ejection Fraction (Modified Macedo Rule): 69 %  ------------------------------------------------------------------------  OBSERVATIONS:  Mitral Valve: Normal mitral valve. Minimal mitral  regurgitation.  Aortic Root: Normal aortic root.  Aortic Valve: Normal trileaflet aortic valve.  Left Atrium: Moderately dilated left atrium.  LA volume  index = 45 cc/m2.  Left Ventricle: Normal left ventricular systolic function.  No segmental wall motion abnormalities. Normal left  ventricular internal dimensions and wall thicknesses.  (DT:152 ms).  Right Heart: Normal right atrium. Right ventricular  enlargement with normal right ventricular systolic  function. Normal tricuspid valve.  Moderate-severe  tricuspid regurgitation. Normal pulmonic valve. Moderate  pulmonic regurgitation.  Pericardium/PleuraNormal pericardium with trace pericardial  effusion. Right pleural effusion.  Hemodynamic: Estimated right ventricular systolic pressure  equals 65 mm Hg, assuming right atrial pressure equals 10  mm Hg, consistent with severe pulmonary hypertension.  ------------------------------------------------------------------------  CONCLUSIONS:  1. Normal left ventricular systolic function. No segmental  wall motion abnormalities.  2. Right ventricular enlargement with normal right  ventricular systolic function.  3. Normal tricuspid valve.Moderate-severe tricuspid  regurgitation.  4. Estimated pulmonary artery systolic pressure equals 65  mm Hg, assuming right atrial pressure equals 10  mm Hg,  consistent with severe pulmonary hypertension.  *** Compared with echocardiogram of 2023, no  significant changes noted.

## 2023-08-30 NOTE — PROGRESS NOTE ADULT - PROBLEM/PLAN-10
DISPLAY PLAN FREE TEXT
medical evaluation
DISPLAY PLAN FREE TEXT

## 2023-08-30 NOTE — PROGRESS NOTE ADULT - PROBLEM SELECTOR PLAN 2
Given L wrist erythema and swelling, c/w ancef   USx neg for DVT, wrist nodule noted likely IV site  c/w monitoring

## 2023-08-30 NOTE — PROGRESS NOTE ADULT - PROBLEM SELECTOR PLAN 1
Pt with ISAMAR on CKD likely in setting of HF exacerbation and anemia. Per [facility], baseline Scr ~2, admitted with Scr of 1.62, and 3.28 on 8/29. Pending AM labs. UA w/ 300 protein. UPCR 4. c/w bumex ggt. Monitor BMP and strict I/Os. Dose meds per eGFR and avoid nephrotoxic agents.

## 2023-08-30 NOTE — PROGRESS NOTE ADULT - PROBLEM SELECTOR PLAN 4
- Pt with CKD 3-4, baseline cr approximately 2, now with ISAMAR on CKD  - Cr 2.09 on admission  - Monitor renal function/UOP, avoid nephrotoxins  - Restarted bicarb tabs  - per renal   Cr had decreased, now again risen. Nephrology consulted, appreciate input.   Currently Cr stable around 2.4  FEUrea consistent with intrinsic pathology
- Pt with CKD 3-4, baseline cr approximately 2, now with ISAMAR on CKD  - Cr 2.09 on admission  - Monitor renal function/UOP, avoid nephrotoxins  - Restarted bicarb tabs  - per renal - will help with hyper K     Cr had decreased, now again risen. Nephrology consulted, appreciate input.   Currently Cr stable around 2.4  HF consulted - consideration for CardioMEMS/RHC.
- Pt with CKD 3-4, baseline cr approximately 2, now with ISAMAR on CKD  - Cr 2.09 on admission  - Monitor renal function/UOP, avoid nephrotoxins  - Restarted bicarb tabs  - per renal - will help with hyper K     Cr had decreased, now again risen. Nephrology consulted, appreciate input. Of the opinion that patient is hypervolemic.  Repeat CXR 8/14 shows mild b/l pleural effusions. Mild wheezing on auscultation.  Cardiology agreeable to restarting diuresis though believe patient is euvolemic, if recommended by nephrology. Cr stable at this time around 2.5.
- Pt with CKD 3-4, baseline cr approximately 2, now with ISAMAR on CKD  - Cr 2.09 on admission  - Monitor renal function/UOP, avoid nephrotoxins  - Restarted bicarb tabs  - per renal   Cr had decreased, now again risen. Nephrology consulted, appreciate input.   Currently Cr stable around 2.4  FEUrea consistent with intrinsic pathology
- Pt with CKD 3-4, baseline cr approximately 2, now with ISAMAR on CKD  - Cr 2.09 on admission  - Monitor renal function/UOP, avoid nephrotoxins  - Restarted bicarb tabs  - per renal - will help with hyper K     Cr had decreased, now again risen. Nephrology consulted, appreciate input.   Currently Cr stable around 2.4  HF consulted - consideration for CardioMEMS/RHC. Pt currently declining inpatient CardioMEMS.
- Pt with CKD 3-4, baseline cr approximately 2, now with ISAMAR on CKD  - Cr 2.09 on admission  - Monitor renal function/UOP, avoid nephrotoxins  - Restarted bicarb tabs  - per renal   Cr had decreased, now again risen. Nephrology consulted, appreciate input.   Currently Cr stable around 2.4  FEUrea consistent with intrinsic pathology
- Pt with CKD 3-4, baseline cr approximately 2, now with ISAMAR on CKD  - Cr 2.09 on admission  - Monitor renal function/UOP, avoid nephrotoxins  - Restarted bicarb tabs  - per renal   Cr had decreased, now again risen. Nephrology consulted, appreciate input.   Currently Cr stable around 2.4  FEUrea consistent with intrinsic pathology
- Pt with CKD 3-4, baseline cr approximately 2, now with ISAMAR on CKD  - Cr 2.09 on admission  - Monitor renal function/UOP, avoid nephrotoxins  - Restarted bicarb tabs  - per renal - will help with hyper K   - given meds for hyperK this AM, will recheck later in the afternoon, Cr 2.25 -> today 2.47
- Right heart cath on 1/23/2023 demonstrates PVR normal, elevated PCWP, mPAP 41, s/o group 2 PHTN  - Pulm notes from January 2023 reviewed. No evidence of fibrosis on CT  - planned for RHC 8/28
- Pt with CKD 3-4, baseline cr approximately 2  - Cr 2.09 on admission  - Monitor renal function/UOP, avoid nephrotoxins  - Hold bicarb tabs due to salt content, restart as needed
2 weeks
- Pt with CKD 3-4, baseline cr approximately 2, now with ISAMAR on CKD  - Cr 2.09 on admission  - Monitor renal function/UOP, avoid nephrotoxins  - Restarted bicarb tabs  - per renal     Cr had decreased, now again risen. Nephrology consulted, appreciate input.   Currently Cr stable around 2.4  HF consulted - consideration for CardioMEMS/RHC. Pt currently declining inpatient CardioMEMS.
- Pt with CKD 3-4, baseline cr approximately 2, now with ISAMAR on CKD  - Cr 2.09 on admission  - Monitor renal function/UOP, avoid nephrotoxins  - Restarted bicarb tabs  - per renal   Cr had decreased, now again risen. Nephrology consulted, appreciate input.   Currently Cr stable around 2.4  FEUrea consistent with intrinsic pathology
- Pt with CKD 3-4, baseline cr approximately 2, now with ISAMAR on CKD  - Cr 2.09 on admission  - Monitor renal function/UOP, avoid nephrotoxins  - Restarted bicarb tabs  - per renal   Cr had decreased, now again risen. Nephrology consulted, appreciate input.   Currently Cr stable around 2.4  FEUrea consistent with intrinsic pathology
- Pt with CKD 3-4, baseline cr approximately 2, now with ISAMAR on CKD  - Cr 2.09 on admission  - Monitor renal function/UOP, avoid nephrotoxins  - Restarted bicarb tabs  - per renal   Cr had decreased, now again risen. Nephrology consulted, appreciate input.   Currently Cr stable around 2.4  Feura consistent with intrinsic pathology
- Pt with CKD 3-4, baseline cr approximately 2, now with ISAMAR on CKD  - Cr 2.09 on admission  - Monitor renal function/UOP, avoid nephrotoxins  - Restarted bicarb tabs  - per renal - will help with hyper K     Cr had decreased, now again risen. Nephrology consulted, appreciate input.   Now recommending HF consult. Pending consideration for CardioMEMS/RHC.
- Right heart cath on 1/23/2023 demonstrates PVR normal, elevated PCWP, mPAP 41, s/o group 2 PHTN  - Pulm notes from January 2023 reviewed. No evidence of fibrosis on CT  - s/pr RHC 8/28
- Right heart cath on 1/23/2023 demonstrates PVR normal, elevated PCWP, mPAP 41, s/o group 2 PHTN  - Pulm notes from January 2023 reviewed. No evidence of fibrosis on CT  - s/pr RHC 8/28
- Pt with CKD 3-4, baseline cr approximately 2, now with ISAMAR on CKD  - Cr 2.09 on admission  - Monitor renal function/UOP, avoid nephrotoxins  - Restarted bicarb tabs  - per renal - will help with hyper K     Cr had decreased, now again risen. Nephrology consulted, appreciate input.
- Pt with CKD 3-4, baseline cr approximately 2, now with ISAMAR on CKD  - Cr 2.09 on admission  - Monitor renal function/UOP, avoid nephrotoxins  - Restarted bicarb tabs  - per renal   Cr had decreased, now again risen. Nephrology consulted, appreciate input.   Currently Cr stable around 2.4  FEUrea consistent with intrinsic pathology

## 2023-08-30 NOTE — PROGRESS NOTE ADULT - REASON FOR ADMISSION
CHF Exacerbation

## 2023-08-30 NOTE — PROGRESS NOTE ADULT - PROBLEM SELECTOR PROBLEM 8
Prophylactic measure
DM (diabetes mellitus)
Prophylactic measure
HTN (hypertension)
Prophylactic measure
HTN (hypertension)
Prophylactic measure
HTN (hypertension)
Prophylactic measure

## 2023-08-30 NOTE — PROGRESS NOTE ADULT - PROBLEM SELECTOR PROBLEM 2
Therapy change made today includes:    Continue Current Plan    -------------------------------------------------------------------------------------------------------------------  The patient was seen for Diabetes Self-Management Education in an individual setting for a diagnosis of diabetes. Time spent with patient was 30 minutes.  Pt referred by Ivette Mckinney MD.  Order located in the patient's computer chart.  Reason for Visit: Post Program Diabetes Education .  Relevant medical history reviewed.    Pt is at office visit today by self.    The barriers to self-care and learning limitations were assessed as no changes since initial/last visit.    Preferences for learning: Visual, repetition, verbal, observation, reading, and doing are acceptable to the patient.       The patients learning needs: Nutrition therapy   Diabetes medications  Acute complications    Verify assessment form is up to date: current and reviewed with patient    Latex allergy: No known latex allergy  -------------------------------------------------------------------------------------------------------------------  Teaching was provided on the following:    Diabetes Disease Process and Treatment Options   Importance of Diabetes Control  Ongoing Education     Labs    Hemoglobin A1C: target value and patient current value reviewed . HbA1C on 5/4/17 was 10.5  Order in for HbA1C.  She will have this done prior to next appt with Dr. Hopper    Nutrition Therapy    Feels she is doing better with diet.  Smaller portions, lower carb foods. Was treated for dehydration in early July and found to have low potassium so she was eating more bananas, apricots, potatoes, etc and she did see an increase in her blood sugars because of this.  She is now back to her regular eating habits. No longer taking cinnamon supplement.    Physical Activity - Incorporating Activity into Lifestyle   Limited because of neuropathy                                          
   Blood Glucose Monitoring   Using One Touch Verio  Did not bring in meter or log book  States blood sugar this morning was 88mg and 91mg prior to lunch    Diabetes Medications  Taking Humalog Mix 75/25 22 units AM and 22 units PM  Metformin 500mg BID    Acute Complications   Patient has not experienced hypoglycemia  Patient instructed on risks, causes, signs, and treatment of Hypoglycemia    Patient is able to state treatment of hypoglycemia  Reminded her that she may feel low even though her blood sugars are in a normal range until her blood sugars remain consistently in control.      Weight  Discussed importance of weight loss with patient for Diabetes management     Chronic Complications     Advised on the potential complications related to uncontrolled diabetes  Foot Care: self-foot exam discussed   Reviewed Essential CareGuidelines (MD/dental/eye visits, lower extremity exams), daily self-foot exams, signs/symptoms of problems/infection, skin care and hygiene, and general health benefits.    Sick Days  Reminded patient to get yearly influenza vaccine.    Diabetes Psychosocial Adjustment and Strategies    Not addressed    Discussed readiness to make changes with patient  Patient states:  she has already made the changes  ------------------------------------------------------------------------------------------------------------------  Written materials provided were: Taking Care of Your Feet for a Lifetime    Goal Setting to Promote Health & Problem Solving for Daily Living was discussed.  See DM Care Plan for goals.    Diabetes Self-Management Support Plan   Diabetes educator contact number/business card  Follow-up with DM educator every 3-6 months pending MD referral    Plan:  Please contact us with any questions/concerns  [facility] Pt Education Services   Report sent to referring provider   The need to follow-up with physician for a diabetes focused visit every 3-6 months addressed.   Pt to follow up with DM 
Hyperkalemia
Hyperkalemia
education in a one on one visit.                    
Hyperkalemia
Hyperkalemia
Normocytic anemia
Hyperkalemia
Pulmonary HTN
Normocytic anemia
Pulmonary HTN
Hyperkalemia
Pulmonary HTN
Hyperkalemia
Normocytic anemia
Normocytic anemia
Pulmonary HTN
Normocytic anemia
Pulmonary HTN
Acute cellulitis
Pulmonary HTN
Acute cellulitis
Pulmonary HTN
Acute cellulitis

## 2023-08-30 NOTE — PROGRESS NOTE ADULT - PROBLEM SELECTOR PROBLEM 6
HTN (hypertension)
Hyponatremia
HTN (hypertension)
Normocytic anemia
HTN (hypertension)
HTN (hypertension)
Hyponatremia
HTN (hypertension)
Hyponatremia

## 2023-08-30 NOTE — DISCHARGE NOTE NURSING/CASE MANAGEMENT/SOCIAL WORK - PATIENT PORTAL LINK FT
You can access the FollowMyHealth Patient Portal offered by [facility] by registering at the following website: [email or web address]. By joining [facility]’s FollowMyHealth portal, you will also be able to view your health information using other applications (apps) compatible with our system.

## 2023-08-30 NOTE — PROGRESS NOTE ADULT - PROBLEM SELECTOR PLAN 6
matthew due to HF  -Pt on IV bumex which might be causing worsening hyponatremia, Hypertonic saline per nephrology recs, will increase rate  -Will trend BMP daily  -F/u with nephrology

## 2023-08-30 NOTE — PROGRESS NOTE ADULT - SUBJECTIVE AND OBJECTIVE BOX
[facility] Division of Kidney Diseases & Hypertension  FOLLOW UP NOTE  [phone]--------------------------------------------------------------------------------  Chief Complaint:Heart failure      24 hour events/subjective: Pt seen and examined at bedside this AM. on bumex infusion since 8/25 and s/p RHC 8/28. Reports feeling fatigued, but better today. Denies fevers, HA, CP, SOB, n/v.    PAST HISTORY  --------------------------------------------------------------------------------  No significant changes to PMH, PSH, FHx, SHx, unless otherwise noted    ALLERGIES & MEDICATIONS  --------------------------------------------------------------------------------  Allergies  No Known Allergies  Intolerances    Standing Inpatient Medications  atorvastatin 40 milliGRAM(s) Oral at bedtime  buMETAnide Infusion 1 mG/Hr IV Continuous <Continuous>  ceFAZolin   IVPB      ceFAZolin   IVPB 1000 milliGRAM(s) IV Intermittent every 12 hours  darbepoetin Injectable ViaL 60 MICROGram(s) SubCutaneous once  dextrose 5%. 1000 milliLiter(s) IV Continuous <Continuous>  dextrose 5%. 1000 milliLiter(s) IV Continuous <Continuous>  dextrose 50% Injectable 25 Gram(s) IV Push once  dextrose 50% Injectable 12.5 Gram(s) IV Push once  dextrose 50% Injectable 25 Gram(s) IV Push once  glucagon  Injectable 1 milliGRAM(s) IntraMuscular once  heparin   Injectable 5000 Unit(s) SubCutaneous every 12 hours  hydrALAZINE 100 milliGRAM(s) Oral every 8 hours  insulin lispro (ADMELOG) corrective regimen sliding scale   SubCutaneous three times a day before meals  insulin lispro (ADMELOG) corrective regimen sliding scale   SubCutaneous at bedtime  insulin lispro Injectable (ADMELOG) 2 Unit(s) SubCutaneous three times a day before meals  isosorbide   dinitrate Tablet (ISORDIL) 10 milliGRAM(s) Oral three times a day  levothyroxine 125 MICROGram(s) Oral daily  NIFEdipine XL 60 milliGRAM(s) Oral daily  oxyCODONE    IR 5 milliGRAM(s) Oral once  pantoprazole    Tablet 40 milliGRAM(s) Oral before breakfast  sodium chloride 3%. 500 milliLiter(s) IV Continuous <Continuous>  sodium chloride 3%. 500 milliLiter(s) IV Continuous <Continuous>    PRN Inpatient Medications  acetaminophen     Tablet .. 650 milliGRAM(s) Oral every 6 hours PRN  dextrose Oral Gel 15 Gram(s) Oral once PRN  guaiFENesin Oral Liquid (Sugar-Free) 200 milliGRAM(s) Oral every 6 hours PRN      REVIEW OF SYSTEMS  --------------------------------------------------------------------------------  as above    VITALS/PHYSICAL EXAM  --------------------------------------------------------------------------------  T(C): 36.3 (08-30-23 @ 05:34), Max: 36.4 (08-29-23 @ 22:12)  HR: 75 (08-30-23 @ 05:34) (68 - 75)  BP: 122/74 (08-30-23 @ 05:34) (122/74 - 133/59)  RR: 17 (08-30-23 @ 05:34) (17 - 18)  SpO2: 97% (08-30-23 @ 05:34) (97% - 100%)  Wt(kg): --    08-29-23 @ 07:01  -  08-30-23 @ 07:00  --------------------------------------------------------  IN: 840 mL / OUT: 0 mL / NET: 840 mL      Physical Exam:  Gen: NAD  HEENT: Anicteric  Pulm: ctab b/l  CV: S1S2+  Abd: Soft, +BS    Ext: no LE edema   Neuro: Awake  Skin: Warm and dry  LABS/STUDIES  --------------------------------------------------------------------------------              8.2    8.13  >-----------<  158      [08-29-23 @ 05:56]              24.0     126  |  89  |  79  ----------------------------<  266      [08-29-23 @ 23:39]  4.2   |  21  |  3.28        Ca     8.9     [08-29-23 @ 23:39]      Mg     1.70     [08-29-23 @ 05:56]      Phos  4.7     [08-29-23 @ 05:56]    TPro  7.5  /  Alb  3.8  /  TBili  1.0  /  DBili  x   /  AST  34  /  ALT  34  /  AlkPhos  560  [08-29-23 @ 05:56]          Creatinine Trend:  SCr 3.28 [08-29 @ 23:39]  SCr 3.32 [08-29 @ 05:56]  SCr 3.27 [08-28 @ 05:23]  SCr 3.25 [08-27 @ 16:58]  SCr 3.08 [08-27 @ 06:29]    Urinalysis - [08-29-23 @ 23:39]      Color  / Appearance  / SG  / pH       Gluc 266 / Ketone   / Bili  / Urobili        Blood  / Protein  / Leuk Est  / Nitrite       RBC  / WBC  / Hyaline  / Gran  / Sq Epi  / Non Sq Epi  / Bacteria     Urine Creatinine 15      [08-27-23 @ 09:00]  Urine Protein 54      [08-27-23 @ 09:00]  Urine Sodium 101      [08-27-23 @ 09:00]  Urine Osmolality 287      [08-27-23 @ 09:00]        Free Light Chains: kappa 10.90, lambda 6.34, ratio = 1.72      [08-24 @ 06:20]

## 2023-08-30 NOTE — PROGRESS NOTE ADULT - ATTENDING SUPERVISION STATEMENT
Fellow

## 2023-08-30 NOTE — PROGRESS NOTE ADULT - PROBLEM SELECTOR PROBLEM 7
DM (diabetes mellitus)
Normocytic anemia
DM (diabetes mellitus)
DM (diabetes mellitus)
HTN (hypertension)
DM (diabetes mellitus)
Normocytic anemia
DM (diabetes mellitus)
Normocytic anemia
DM (diabetes mellitus)

## 2023-08-30 NOTE — PROGRESS NOTE ADULT - PROBLEM SELECTOR PROBLEM 5
Normocytic anemia
Hypothyroid
Normocytic anemia
Hyponatremia
Normocytic anemia
Hypothyroid
Normocytic anemia
Hypothyroid

## 2023-08-30 NOTE — PROGRESS NOTE ADULT - PROBLEM SELECTOR PLAN 8
Diet: CC  DVT proph: heparin subQ for DVT ppx  Dispo: Likely home      Pending improvement and stability in Cr, establishment of stable diuretic regimen, CHF input
Diet: CC  DVT proph: given anemia Hg stable, no bleeding, given reduced mobility and hx of CHF, will start heparin subQ for DVT ppx  Dispo: Likely home      Pending improvement and stability in Cr, establishment of stable diuretic regimen.
Diet: CC  DVT proph: heparin subQ for DVT ppx  Dispo: Likely home      Pending improvement and stability in Cr, establishment of stable diuretic regimen, CHF input
Diet: CC  DVT proph: SCDs  Dispo: Likely home
Diet: CC  DVT proph: given anemia Hg stable, no bleeding, given reduced mobility and hx of CHF, will start heparin subQ for DVT ppx  Dispo: Likely home      Pending improvement and stability in Cr, establishment of stable diuretic regimen, CHF input - CardioMEMS?
Diet: CC  DVT proph: heparin subQ for DVT ppx  Dispo: Likely home      Pending improvement and stability in Cr, establishment of stable diuretic regimen, CHF input  Pending [facility] 8/28
Uncontrolled HTN  - Nifedipine 60mg qd, can uptitrate as needed   - Continue hydral 100 q8
Diet: CC  DVT proph: heparin subQ for DVT ppx  Dispo: Likely home      Pending improvement and stability in Cr, establishment of stable diuretic regimen, CHF input - CardioMEMS?
Diet: CC  DVT proph: given anemia Hg stable, no bleeding, given reduced mobility and hx of CHF, will start heparin subQ for DVT ppx  Dispo: Likely home
Diet: CC  DVT proph: heparin subQ for DVT ppx  Dispo: Likely home      Pending improvement and stability in Cr, establishment of stable diuretic regimen, CHF input  Pending [facility] 8/28
Uncontrolled HTN  - Nifedipine 60mg qd, can uptitrate as needed   - Continue hydral 100 q8
Diet: CC  DVT proph: heparin subQ for DVT ppx  Dispo: Likely home      Pending improvement and stability in Cr, establishment of stable diuretic regimen, CHF input
- On repaglinide at home  - ISS here  - A1c 6.3 in June. Outpatient fructosamine corresponds to A1c 7.8
Diet: CC  DVT proph: heparin subQ for DVT ppx  Dispo: Likely home      Pending improvement and stability in Cr, establishment of stable diuretic regimen, CHF input
Uncontrolled HTN  - Nifedipine 60mg qd, can uptitrate as needed   - Continue hydral 100 q8
Diet: CC  DVT proph: given anemia Hg stable, no bleeding, given reduced mobility and hx of CHF, will start heparin subQ for DVT ppx  Dispo: Likely home      Pending improvement and stability in Cr, establishment of stable diuretic regimen.
Diet: CC  DVT proph: given anemia Hg stable, no bleeding, given reduced mobility and hx of CHF, will start heparin subQ for DVT ppx  Dispo: Likely home      Pending improvement and stability in Cr, establishment of stable diuretic regimen, CHF input.
Diet: CC  DVT proph: heparin subQ for DVT ppx  Dispo: Likely home      Pending improvement and stability in Cr, establishment of stable diuretic regimen, CHF input - CardioMEMS?
Diet: CC  DVT proph: given anemia Hg stable, no bleeding, given reduced mobility and hx of CHF, will start heparin subQ for DVT ppx  Dispo: Likely home      Pending improvement and stability in Cr, establishment of stable diuretic regimen, CHF input - CardioMEMS?

## 2023-08-30 NOTE — PROGRESS NOTE ADULT - PROBLEM SELECTOR PROBLEM 3
Acute renal failure superimposed on stage 3b chronic kidney disease
Hypothyroid
Normocytic anemia
Hyponatremia
Normocytic anemia
Normocytic anemia
Hypothyroid
Normocytic anemia
Hyponatremia
Normocytic anemia
Hypothyroid
Hypothyroid
Normocytic anemia
Normocytic anemia
Hyponatremia
Hypothyroid
Acute renal failure superimposed on stage 3b chronic kidney disease
Hypothyroid
Acute renal failure superimposed on stage 3b chronic kidney disease
Hypothyroid
Hypothyroid

## 2023-09-03 NOTE — H&P ADULT - PROBLEM SELECTOR PROBLEM 1
Anemia

## 2023-09-07 ENCOUNTER — APPOINTMENT (OUTPATIENT)
Dept: HEART FAILURE | Facility: CLINIC | Age: 58
End: 2023-09-07

## 2023-09-07 NOTE — PROGRESS NOTE ADULT - PROBLEM SELECTOR PROBLEM 6
Pt called today with update on blood pressures  Last night at 5:30  134/52  This am before meds  147/77  10 am this morning  154/66  1110 this morning 123/61  Heart rate has been in the upper 50s  He did not change his Hyzaar because he had felt the low reading was from dehydration   Microcytic anemia

## 2023-09-20 ENCOUNTER — INPATIENT (INPATIENT)
Facility: HOSPITAL | Age: 58
LOS: 15 days | Discharge: HOME | End: 2023-10-06
Attending: HOSPITALIST | Admitting: HOSPITALIST
Payer: COMMERCIAL

## 2023-09-20 VITALS
OXYGEN SATURATION: 100 % | RESPIRATION RATE: 16 BRPM | HEIGHT: 62 IN | SYSTOLIC BLOOD PRESSURE: 134 MMHG | HEART RATE: 82 BPM | DIASTOLIC BLOOD PRESSURE: 66 MMHG | TEMPERATURE: 98 F

## 2023-09-20 DIAGNOSIS — Z98.49 CATARACT EXTRACTION STATUS, UNSPECIFIED EYE: Chronic | ICD-10-CM

## 2023-09-20 DIAGNOSIS — I50.9 HEART FAILURE, UNSPECIFIED: ICD-10-CM

## 2023-09-20 LAB
ALBUMIN SERPL ELPH-MCNC: 4 G/DL — SIGNIFICANT CHANGE UP (ref 3.3–5)
ALBUMIN SERPL ELPH-MCNC: 4 G/DL — SIGNIFICANT CHANGE UP (ref 3.3–5)
ALP SERPL-CCNC: 1093 U/L — HIGH (ref 40–120)
ALP SERPL-CCNC: 1130 U/L — HIGH (ref 40–120)
ALT FLD-CCNC: 23 U/L — SIGNIFICANT CHANGE UP (ref 4–33)
ALT FLD-CCNC: 23 U/L — SIGNIFICANT CHANGE UP (ref 4–33)
ANION GAP SERPL CALC-SCNC: 20 MMOL/L — HIGH (ref 7–14)
ANION GAP SERPL CALC-SCNC: 21 MMOL/L — HIGH (ref 7–14)
AST SERPL-CCNC: 29 U/L — SIGNIFICANT CHANGE UP (ref 4–32)
AST SERPL-CCNC: 32 U/L — SIGNIFICANT CHANGE UP (ref 4–32)
B PERT DNA SPEC QL NAA+PROBE: SIGNIFICANT CHANGE UP
B PERT+PARAPERT DNA PNL SPEC NAA+PROBE: SIGNIFICANT CHANGE UP
BASE EXCESS BLDV CALC-SCNC: -12.3 MMOL/L — LOW (ref -2–3)
BASE EXCESS BLDV CALC-SCNC: -12.3 MMOL/L — LOW (ref -2–3)
BASE EXCESS BLDV CALC-SCNC: -12.9 MMOL/L — LOW (ref -2–3)
BASOPHILS # BLD AUTO: 0.03 K/UL — SIGNIFICANT CHANGE UP (ref 0–0.2)
BASOPHILS NFR BLD AUTO: 0.3 % — SIGNIFICANT CHANGE UP (ref 0–2)
BILIRUB SERPL-MCNC: 0.4 MG/DL — SIGNIFICANT CHANGE UP (ref 0.2–1.2)
BILIRUB SERPL-MCNC: 0.4 MG/DL — SIGNIFICANT CHANGE UP (ref 0.2–1.2)
BLOOD GAS VENOUS COMPREHENSIVE RESULT: SIGNIFICANT CHANGE UP
BORDETELLA PARAPERTUSSIS (RAPRVP): SIGNIFICANT CHANGE UP
BUN SERPL-MCNC: 105 MG/DL — HIGH (ref 7–23)
BUN SERPL-MCNC: 110 MG/DL — HIGH (ref 7–23)
C PNEUM DNA SPEC QL NAA+PROBE: SIGNIFICANT CHANGE UP
CALCIUM SERPL-MCNC: 9.2 MG/DL — SIGNIFICANT CHANGE UP (ref 8.4–10.5)
CALCIUM SERPL-MCNC: 9.4 MG/DL — SIGNIFICANT CHANGE UP (ref 8.4–10.5)
CHLORIDE BLDV-SCNC: 91 MMOL/L — LOW (ref 96–108)
CHLORIDE BLDV-SCNC: 92 MMOL/L — LOW (ref 96–108)
CHLORIDE BLDV-SCNC: 92 MMOL/L — LOW (ref 96–108)
CHLORIDE SERPL-SCNC: 87 MMOL/L — LOW (ref 98–107)
CHLORIDE SERPL-SCNC: 89 MMOL/L — LOW (ref 98–107)
CO2 BLDV-SCNC: 15.2 MMOL/L — LOW (ref 22–26)
CO2 BLDV-SCNC: 15.2 MMOL/L — LOW (ref 22–26)
CO2 BLDV-SCNC: 16.1 MMOL/L — LOW (ref 22–26)
CO2 SERPL-SCNC: 13 MMOL/L — LOW (ref 22–31)
CO2 SERPL-SCNC: 15 MMOL/L — LOW (ref 22–31)
CREAT SERPL-MCNC: 3.92 MG/DL — HIGH (ref 0.5–1.3)
CREAT SERPL-MCNC: 3.94 MG/DL — HIGH (ref 0.5–1.3)
D DIMER BLD IA.RAPID-MCNC: 3509 NG/ML DDU — HIGH
EGFR: 13 ML/MIN/1.73M2 — LOW
EGFR: 13 ML/MIN/1.73M2 — LOW
EOSINOPHIL # BLD AUTO: 0.13 K/UL — SIGNIFICANT CHANGE UP (ref 0–0.5)
EOSINOPHIL NFR BLD AUTO: 1.4 % — SIGNIFICANT CHANGE UP (ref 0–6)
FLUAV SUBTYP SPEC NAA+PROBE: SIGNIFICANT CHANGE UP
FLUBV RNA SPEC QL NAA+PROBE: SIGNIFICANT CHANGE UP
GAS PNL BLDV: 121 MMOL/L — LOW (ref 136–145)
GAS PNL BLDV: 121 MMOL/L — LOW (ref 136–145)
GAS PNL BLDV: 122 MMOL/L — LOW (ref 136–145)
GAS PNL BLDV: SIGNIFICANT CHANGE UP
GLUCOSE BLDV-MCNC: 235 MG/DL — HIGH (ref 70–99)
GLUCOSE BLDV-MCNC: 249 MG/DL — HIGH (ref 70–99)
GLUCOSE BLDV-MCNC: 260 MG/DL — HIGH (ref 70–99)
GLUCOSE SERPL-MCNC: 245 MG/DL — HIGH (ref 70–99)
GLUCOSE SERPL-MCNC: 250 MG/DL — HIGH (ref 70–99)
HADV DNA SPEC QL NAA+PROBE: SIGNIFICANT CHANGE UP
HCO3 BLDV-SCNC: 14 MMOL/L — LOW (ref 22–29)
HCO3 BLDV-SCNC: 14 MMOL/L — LOW (ref 22–29)
HCO3 BLDV-SCNC: 15 MMOL/L — LOW (ref 22–29)
HCOV 229E RNA SPEC QL NAA+PROBE: SIGNIFICANT CHANGE UP
HCOV HKU1 RNA SPEC QL NAA+PROBE: SIGNIFICANT CHANGE UP
HCOV NL63 RNA SPEC QL NAA+PROBE: SIGNIFICANT CHANGE UP
HCOV OC43 RNA SPEC QL NAA+PROBE: SIGNIFICANT CHANGE UP
HCT VFR BLD CALC: 26.7 % — LOW (ref 34.5–45)
HCT VFR BLDA CALC: 26 % — LOW (ref 34.5–46.5)
HCT VFR BLDA CALC: 26 % — LOW (ref 34.5–46.5)
HCT VFR BLDA CALC: 28 % — LOW (ref 34.5–46.5)
HGB BLD CALC-MCNC: 8.7 G/DL — LOW (ref 11.7–16.1)
HGB BLD CALC-MCNC: 8.8 G/DL — LOW (ref 11.7–16.1)
HGB BLD CALC-MCNC: 9.2 G/DL — LOW (ref 11.7–16.1)
HGB BLD-MCNC: 8.9 G/DL — LOW (ref 11.5–15.5)
HMPV RNA SPEC QL NAA+PROBE: SIGNIFICANT CHANGE UP
HPIV1 RNA SPEC QL NAA+PROBE: SIGNIFICANT CHANGE UP
HPIV2 RNA SPEC QL NAA+PROBE: SIGNIFICANT CHANGE UP
HPIV3 RNA SPEC QL NAA+PROBE: SIGNIFICANT CHANGE UP
HPIV4 RNA SPEC QL NAA+PROBE: SIGNIFICANT CHANGE UP
IANC: 8 K/UL — HIGH (ref 1.8–7.4)
IMM GRANULOCYTES NFR BLD AUTO: 0.7 % — SIGNIFICANT CHANGE UP (ref 0–0.9)
LACTATE BLDV-MCNC: 0.9 MMOL/L — SIGNIFICANT CHANGE UP (ref 0.5–2)
LACTATE BLDV-MCNC: 1.1 MMOL/L — SIGNIFICANT CHANGE UP (ref 0.5–2)
LACTATE BLDV-MCNC: 1.6 MMOL/L — SIGNIFICANT CHANGE UP (ref 0.5–2)
LYMPHOCYTES # BLD AUTO: 0.47 K/UL — LOW (ref 1–3.3)
LYMPHOCYTES # BLD AUTO: 5.1 % — LOW (ref 13–44)
M PNEUMO DNA SPEC QL NAA+PROBE: SIGNIFICANT CHANGE UP
MCHC RBC-ENTMCNC: 25.1 PG — LOW (ref 27–34)
MCHC RBC-ENTMCNC: 33.3 GM/DL — SIGNIFICANT CHANGE UP (ref 32–36)
MCV RBC AUTO: 75.4 FL — LOW (ref 80–100)
MONOCYTES # BLD AUTO: 0.5 K/UL — SIGNIFICANT CHANGE UP (ref 0–0.9)
MONOCYTES NFR BLD AUTO: 5.4 % — SIGNIFICANT CHANGE UP (ref 2–14)
NEUTROPHILS # BLD AUTO: 8 K/UL — HIGH (ref 1.8–7.4)
NEUTROPHILS NFR BLD AUTO: 87.1 % — HIGH (ref 43–77)
NRBC # BLD: 0 /100 WBCS — SIGNIFICANT CHANGE UP (ref 0–0)
NRBC # FLD: 0 K/UL — SIGNIFICANT CHANGE UP (ref 0–0)
NT-PROBNP SERPL-SCNC: 2092 PG/ML — HIGH
NT-PROBNP SERPL-SCNC: 2155 PG/ML — HIGH
PCO2 BLDV: 34 MMHG — LOW (ref 39–52)
PCO2 BLDV: 36 MMHG — LOW (ref 39–52)
PCO2 BLDV: 38 MMHG — LOW (ref 39–52)
PH BLDV: 7.2 — LOW (ref 7.32–7.43)
PH BLDV: 7.2 — LOW (ref 7.32–7.43)
PH BLDV: 7.23 — LOW (ref 7.32–7.43)
PLATELET # BLD AUTO: 307 K/UL — SIGNIFICANT CHANGE UP (ref 150–400)
PO2 BLDV: 34 MMHG — SIGNIFICANT CHANGE UP (ref 25–45)
PO2 BLDV: 43 MMHG — SIGNIFICANT CHANGE UP (ref 25–45)
PO2 BLDV: 48 MMHG — HIGH (ref 25–45)
POTASSIUM BLDV-SCNC: 5.1 MMOL/L — SIGNIFICANT CHANGE UP (ref 3.5–5.1)
POTASSIUM BLDV-SCNC: 5.1 MMOL/L — SIGNIFICANT CHANGE UP (ref 3.5–5.1)
POTASSIUM BLDV-SCNC: 5.3 MMOL/L — HIGH (ref 3.5–5.1)
POTASSIUM SERPL-MCNC: 4.9 MMOL/L — SIGNIFICANT CHANGE UP (ref 3.5–5.3)
POTASSIUM SERPL-MCNC: 5.2 MMOL/L — SIGNIFICANT CHANGE UP (ref 3.5–5.3)
POTASSIUM SERPL-SCNC: 4.9 MMOL/L — SIGNIFICANT CHANGE UP (ref 3.5–5.3)
POTASSIUM SERPL-SCNC: 5.2 MMOL/L — SIGNIFICANT CHANGE UP (ref 3.5–5.3)
PROT SERPL-MCNC: 8.2 G/DL — SIGNIFICANT CHANGE UP (ref 6–8.3)
PROT SERPL-MCNC: 8.3 G/DL — SIGNIFICANT CHANGE UP (ref 6–8.3)
RAPID RVP RESULT: SIGNIFICANT CHANGE UP
RBC # BLD: 3.54 M/UL — LOW (ref 3.8–5.2)
RBC # FLD: 14.6 % — HIGH (ref 10.3–14.5)
RSV RNA SPEC QL NAA+PROBE: SIGNIFICANT CHANGE UP
RV+EV RNA SPEC QL NAA+PROBE: SIGNIFICANT CHANGE UP
SAO2 % BLDV: 54.4 % — LOW (ref 67–88)
SAO2 % BLDV: 67.8 % — SIGNIFICANT CHANGE UP (ref 67–88)
SAO2 % BLDV: 76.4 % — SIGNIFICANT CHANGE UP (ref 67–88)
SARS-COV-2 RNA SPEC QL NAA+PROBE: SIGNIFICANT CHANGE UP
SODIUM SERPL-SCNC: 122 MMOL/L — LOW (ref 135–145)
SODIUM SERPL-SCNC: 123 MMOL/L — LOW (ref 135–145)
TROPONIN T, HIGH SENSITIVITY RESULT: 64 NG/L — CRITICAL HIGH
TROPONIN T, HIGH SENSITIVITY RESULT: 65 NG/L — CRITICAL HIGH
WBC # BLD: 9.19 K/UL — SIGNIFICANT CHANGE UP (ref 3.8–10.5)
WBC # FLD AUTO: 9.19 K/UL — SIGNIFICANT CHANGE UP (ref 3.8–10.5)

## 2023-09-20 PROCEDURE — 99291 CRITICAL CARE FIRST HOUR: CPT

## 2023-09-20 PROCEDURE — 71045 X-RAY EXAM CHEST 1 VIEW: CPT | Mod: 26

## 2023-09-20 RX ORDER — INSULIN LISPRO 100/ML
VIAL (ML) SUBCUTANEOUS
Refills: 0 | Status: DISCONTINUED | OUTPATIENT
Start: 2023-09-20 | End: 2023-09-22

## 2023-09-20 RX ORDER — LANOLIN ALCOHOL/MO/W.PET/CERES
3 CREAM (GRAM) TOPICAL AT BEDTIME
Refills: 0 | Status: DISCONTINUED | OUTPATIENT
Start: 2023-09-20 | End: 2023-10-06

## 2023-09-20 RX ORDER — DEXTROSE 50 % IN WATER 50 %
12.5 SYRINGE (ML) INTRAVENOUS ONCE
Refills: 0 | Status: DISCONTINUED | OUTPATIENT
Start: 2023-09-20 | End: 2023-10-06

## 2023-09-20 RX ORDER — INSULIN LISPRO 100/ML
VIAL (ML) SUBCUTANEOUS AT BEDTIME
Refills: 0 | Status: DISCONTINUED | OUTPATIENT
Start: 2023-09-20 | End: 2023-09-22

## 2023-09-20 RX ORDER — DEXTROSE 50 % IN WATER 50 %
25 SYRINGE (ML) INTRAVENOUS ONCE
Refills: 0 | Status: DISCONTINUED | OUTPATIENT
Start: 2023-09-20 | End: 2023-10-06

## 2023-09-20 RX ORDER — HEPARIN SODIUM 5000 [USP'U]/ML
5000 INJECTION INTRAVENOUS; SUBCUTANEOUS EVERY 8 HOURS
Refills: 0 | Status: DISCONTINUED | OUTPATIENT
Start: 2023-09-20 | End: 2023-09-26

## 2023-09-20 RX ORDER — SODIUM CHLORIDE 9 MG/ML
1000 INJECTION, SOLUTION INTRAVENOUS
Refills: 0 | Status: DISCONTINUED | OUTPATIENT
Start: 2023-09-20 | End: 2023-10-06

## 2023-09-20 RX ORDER — FUROSEMIDE 40 MG
80 TABLET ORAL ONCE
Refills: 0 | Status: COMPLETED | OUTPATIENT
Start: 2023-09-20 | End: 2023-09-20

## 2023-09-20 RX ORDER — IPRATROPIUM/ALBUTEROL SULFATE 18-103MCG
3 AEROSOL WITH ADAPTER (GRAM) INHALATION ONCE
Refills: 0 | Status: COMPLETED | OUTPATIENT
Start: 2023-09-20 | End: 2023-09-20

## 2023-09-20 RX ORDER — DEXTROSE 50 % IN WATER 50 %
15 SYRINGE (ML) INTRAVENOUS ONCE
Refills: 0 | Status: DISCONTINUED | OUTPATIENT
Start: 2023-09-20 | End: 2023-10-06

## 2023-09-20 RX ORDER — GLUCAGON INJECTION, SOLUTION 0.5 MG/.1ML
1 INJECTION, SOLUTION SUBCUTANEOUS ONCE
Refills: 0 | Status: DISCONTINUED | OUTPATIENT
Start: 2023-09-20 | End: 2023-10-06

## 2023-09-20 RX ADMIN — Medication 80 MILLIGRAM(S): at 19:56

## 2023-09-20 RX ADMIN — Medication 3 MILLILITER(S): at 18:08

## 2023-09-20 NOTE — ED ADULT NURSE REASSESSMENT NOTE - NS ED NURSE REASSESS COMMENT FT1
Patient states "I just took my Bumex medication at around 630pm". Patient educated on not to take home medications at this time. MD Wiggins aware and states OK to still give Lasix IV. Safety maintained.
Pt alert and orientedx4, in no apparent distress. Pt denies chest pain, SOB, lightheadedness, pain. Pt maintained on Bipap, pt wants it off but pt explained that it needs to remain in place until doctor allows removal. Call bell within reach, bed in lowest position, will continue to monitor.
Break Relief RN: Patient A&Ox4, ambulatory at baseline on bipap at this time O2 saturation 100%. RR equal and unlabored post nebulizer treatment. IV intact. Remains NSR on cardiac monitor.  Care plan continued. Comfort measures provided. Safety maintained. Awaiting further orders.

## 2023-09-20 NOTE — ED PROVIDER NOTE - CLINICAL SUMMARY MEDICAL DECISION MAKING FREE TEXT BOX
Shila Hernandez is a 58 y.o. F PMHx significant for CKD, pulmonary HTN, hypothyroidism, DM, HLD, HTN who presents to the ED with C.O. weakness, L-sided chest "heaviness", and SOB on exertion. Given HPI and physical exam findings, most concerning differentials include but not limited to acute CHF exacerbation vs PE. ACS vs PNA is considered however, EKG is reassuring and not indicative of ischemic changes, lung sounds are clear diffusely, and patient is afebrile reassuring for PNA.  Anemia versus infection including but not limited to UTI is considered as potential etiology of weakness.  Will obtain CBC, CMP, UA, VBG to assess for electrolyte disturbance versus anemia versus infectious etiology.  Will obtain EKG, troponin, proBNP to assess CHF versus ACS.  CXR will be obtained to rule out pulmonary etiology and assess likely cardiac etiology.  This patient will likely be admitted given concern for acute CHF exacerbation and concerning clinical status.

## 2023-09-20 NOTE — ED PROVIDER NOTE - PROGRESS NOTE DETAILS
Gary Duran, PGY3 This patient was signed out to me pending repeat blood gas.  Patient was placed on BiPAP and she said that she took her Bumex at 6 PM.  Patient also received 80 of Lasix in the emergency department.  Patient notes her shortness of breath has improved with treatment.  Patient's blood gas shows a pH of 7.2 and a low bicarb likely due to patient's CKD.  Patient is comfortable on BiPAP currently.  Patient was on BiPAP previously in December for CHF.

## 2023-09-20 NOTE — ED ADULT NURSE NOTE - NSFALLUNIVINTERV_ED_ALL_ED
Bed/Stretcher in lowest position, wheels locked, appropriate side rails in place/Call bell, personal items and telephone in reach/Instruct patient to call for assistance before getting out of bed/chair/stretcher/Non-slip footwear applied when patient is off stretcher/[place] to call system/Physically safe environment - no spills, clutter or unnecessary equipment/Purposeful proactive rounding/Room/bathroom lighting operational, light cord in reach

## 2023-09-20 NOTE — ED ADULT TRIAGE NOTE - CHIEF COMPLAINT QUOTE
Pt c/o weakness, chest discomfort,  nausea, cough and congestion x 3 days; 1 episode of diarrhea this am.  Denies dizziness, chills

## 2023-09-20 NOTE — ED PROVIDER NOTE - ATTENDING CONTRIBUTION TO CARE
I (Slade) agree with above, I performed a history and physical. Counseled rahel medical staff, physician assistant, and/or medical student on medical decision making as documented. Medical decisions and treatment interventions were made in real time during the patient encounter. Additionally and/or with the following exceptions: 58-year-old female past medical history of right-sided congestive heart failure chronic kidney disease who is presenting emergency department with several days of shortness of breath has been getting worse.  Daughter is at bedside to also provide history.  Chart was reviewed patient is speaking in one-word sentences.  She had respiratory distress requiring BiPAP patient was upgraded to the main and care endorsed to the team with plan for admission.  At that time patient was pending labs, chest x-ray, reassessment.  Dimer found to be elevated patient was not a candidate for CT angiography patient would undoubtedly require admission.  The patient's condition was not amenable to outpatient treatment due either the lack of feasibility of outpatient care coordination, possibility for further decompensation with adverse outcome if discharge, or treatments and diagnostic  modalities only available during an inpatient hospitalization.  Additional time as above spent by myself, separate from billable procedures, included coordination of patient care with consultants/admitting team, performing reassessments on the patient, documentation, and counseling patient/family members on the care provided.

## 2023-09-20 NOTE — ED ADULT NURSE NOTE - OBJECTIVE STATEMENT
pt received to rm 15  , a&ox4 , ambulatory , phx of CHF, CKD, Asthma, DM2  , p/w weakness, SOB, and orthopnea x 2 days worse today  . Pt breathing even and unlabored on room air. Denies fever, chills, cough, palpitations, dizziness, nausea, vomiting, diarrhea, constipation, numbness, tingling. IV placed. Labs collected and sent. EKG in chart. pending moving pt to the main for increased level of care  .  pt educated on fall precautions and confirms understanding via teach back method. Stretcher locked in lowest position with siderails up x2.

## 2023-09-20 NOTE — ED PROVIDER NOTE - OBJECTIVE STATEMENT
Shila Hernandez is a 58 y.o. F PMHx significant for CKD, pulmonary hypertension, hypothyroidism, DM, hyperlipidemia, HTN who presents to the ED with C.O.weakness, left sided chest "heaviness", and SOB on exertion.  Pt was recently discharged on 12/30 after 18-day admission for acute on chronic CHF exacerbation.  Since that time, patient states she has generally not felt well.  States generalized fatigue and weakness since then.  She states chest heaviness, has been intermittent, localized to the L. chest without radiation, worse when laying flat and on the left side.  She is also had SOB particularly on exertion.  Over the last few days, Shila Hernandez is a 58 y.o. F PMHx significant for CKD, pulmonary HTN, hypothyroidism, DM, HLD, HTN who presents to the ED with C.O. weakness, L-sided chest "heaviness", and SOB on exertion.  Pt was recently discharged on 12/30 after 18-day admission for acute on chronic CHF exacerbation.  Since that time, patient states she has generally not felt well.  States generalized fatigue and weakness since then.  Chest heaviness, has been intermittent, localized to the L. chest without radiation, worse when laying flat and on the left Decubital position. Pt also endorses SOB particularly on exertion. Over the last few days, pt also c.o. congestion and decreased PO intake.  She denies feeling sick with the flu or cold, denies fevers, N/V, ABD pain, dysuria, burning with urination, hematuria, she does report an episode of diarrhea this a.m. that was without blood.  Otherwise denies other myalgias or arthralgias.    Of note, this patient was discharged on Bumex 2 mg twice daily, hydralazine 100 mg 3 times daily and Isordil 10 mg 3 times daily.  Patient does endorse taking these medications as prescribed, last dose of Bumex this a.m.

## 2023-09-21 DIAGNOSIS — I27.20 PULMONARY HYPERTENSION, UNSPECIFIED: ICD-10-CM

## 2023-09-21 DIAGNOSIS — Z71.89 OTHER SPECIFIED COUNSELING: ICD-10-CM

## 2023-09-21 DIAGNOSIS — I50.813 ACUTE ON CHRONIC RIGHT HEART FAILURE: ICD-10-CM

## 2023-09-21 DIAGNOSIS — N17.9 ACUTE KIDNEY FAILURE, UNSPECIFIED: ICD-10-CM

## 2023-09-21 DIAGNOSIS — E11.9 TYPE 2 DIABETES MELLITUS WITHOUT COMPLICATIONS: ICD-10-CM

## 2023-09-21 DIAGNOSIS — D64.9 ANEMIA, UNSPECIFIED: ICD-10-CM

## 2023-09-21 DIAGNOSIS — E03.9 HYPOTHYROIDISM, UNSPECIFIED: ICD-10-CM

## 2023-09-21 DIAGNOSIS — Z29.9 ENCOUNTER FOR PROPHYLACTIC MEASURES, UNSPECIFIED: ICD-10-CM

## 2023-09-21 DIAGNOSIS — I50.33 ACUTE ON CHRONIC DIASTOLIC (CONGESTIVE) HEART FAILURE: ICD-10-CM

## 2023-09-21 DIAGNOSIS — R74.8 ABNORMAL LEVELS OF OTHER SERUM ENZYMES: ICD-10-CM

## 2023-09-21 DIAGNOSIS — E87.1 HYPO-OSMOLALITY AND HYPONATREMIA: ICD-10-CM

## 2023-09-21 DIAGNOSIS — I10 ESSENTIAL (PRIMARY) HYPERTENSION: ICD-10-CM

## 2023-09-21 DIAGNOSIS — R06.00 DYSPNEA, UNSPECIFIED: ICD-10-CM

## 2023-09-21 LAB
ALBUMIN SERPL ELPH-MCNC: 4 G/DL — SIGNIFICANT CHANGE UP (ref 3.3–5)
ALP SERPL-CCNC: 965 U/L — HIGH (ref 40–120)
ALT FLD-CCNC: 25 U/L — SIGNIFICANT CHANGE UP (ref 4–33)
ANION GAP SERPL CALC-SCNC: 18 MMOL/L — HIGH (ref 7–14)
ANION GAP SERPL CALC-SCNC: 20 MMOL/L — HIGH (ref 7–14)
AST SERPL-CCNC: 29 U/L — SIGNIFICANT CHANGE UP (ref 4–32)
BILIRUB SERPL-MCNC: 0.4 MG/DL — SIGNIFICANT CHANGE UP (ref 0.2–1.2)
BUN SERPL-MCNC: 108 MG/DL — HIGH (ref 7–23)
BUN SERPL-MCNC: 111 MG/DL — HIGH (ref 7–23)
CALCIUM SERPL-MCNC: 8.9 MG/DL — SIGNIFICANT CHANGE UP (ref 8.4–10.5)
CALCIUM SERPL-MCNC: 9.1 MG/DL — SIGNIFICANT CHANGE UP (ref 8.4–10.5)
CHLORIDE SERPL-SCNC: 88 MMOL/L — LOW (ref 98–107)
CHLORIDE SERPL-SCNC: 90 MMOL/L — LOW (ref 98–107)
CO2 SERPL-SCNC: 12 MMOL/L — LOW (ref 22–31)
CO2 SERPL-SCNC: 15 MMOL/L — LOW (ref 22–31)
CREAT SERPL-MCNC: 3.66 MG/DL — HIGH (ref 0.5–1.3)
CREAT SERPL-MCNC: 3.76 MG/DL — HIGH (ref 0.5–1.3)
EGFR: 13 ML/MIN/1.73M2 — LOW
EGFR: 14 ML/MIN/1.73M2 — LOW
GLUCOSE BLDC GLUCOMTR-MCNC: 152 MG/DL — HIGH (ref 70–99)
GLUCOSE BLDC GLUCOMTR-MCNC: 219 MG/DL — HIGH (ref 70–99)
GLUCOSE BLDC GLUCOMTR-MCNC: 245 MG/DL — HIGH (ref 70–99)
GLUCOSE BLDC GLUCOMTR-MCNC: 255 MG/DL — HIGH (ref 70–99)
GLUCOSE BLDC GLUCOMTR-MCNC: 276 MG/DL — HIGH (ref 70–99)
GLUCOSE SERPL-MCNC: 223 MG/DL — HIGH (ref 70–99)
GLUCOSE SERPL-MCNC: 271 MG/DL — HIGH (ref 70–99)
HCT VFR BLD CALC: 25.9 % — LOW (ref 34.5–45)
HGB BLD-MCNC: 8.7 G/DL — LOW (ref 11.5–15.5)
MAGNESIUM SERPL-MCNC: 1.8 MG/DL — SIGNIFICANT CHANGE UP (ref 1.6–2.6)
MAGNESIUM SERPL-MCNC: 1.9 MG/DL — SIGNIFICANT CHANGE UP (ref 1.6–2.6)
MAGNESIUM SERPL-MCNC: 1.9 MG/DL — SIGNIFICANT CHANGE UP (ref 1.6–2.6)
MCHC RBC-ENTMCNC: 25.7 PG — LOW (ref 27–34)
MCHC RBC-ENTMCNC: 33.6 GM/DL — SIGNIFICANT CHANGE UP (ref 32–36)
MCV RBC AUTO: 76.6 FL — LOW (ref 80–100)
NRBC # BLD: 0 /100 WBCS — SIGNIFICANT CHANGE UP (ref 0–0)
NRBC # FLD: 0 K/UL — SIGNIFICANT CHANGE UP (ref 0–0)
NT-PROBNP SERPL-SCNC: 1819 PG/ML — HIGH
PHOSPHATE SERPL-MCNC: 7.3 MG/DL — HIGH (ref 2.5–4.5)
PLATELET # BLD AUTO: 225 K/UL — SIGNIFICANT CHANGE UP (ref 150–400)
POTASSIUM SERPL-MCNC: 4.3 MMOL/L — SIGNIFICANT CHANGE UP (ref 3.5–5.3)
POTASSIUM SERPL-MCNC: 4.7 MMOL/L — SIGNIFICANT CHANGE UP (ref 3.5–5.3)
POTASSIUM SERPL-SCNC: 4.3 MMOL/L — SIGNIFICANT CHANGE UP (ref 3.5–5.3)
POTASSIUM SERPL-SCNC: 4.7 MMOL/L — SIGNIFICANT CHANGE UP (ref 3.5–5.3)
PROT SERPL-MCNC: 7.7 G/DL — SIGNIFICANT CHANGE UP (ref 6–8.3)
RBC # BLD: 3.38 M/UL — LOW (ref 3.8–5.2)
RBC # FLD: 14.5 % — SIGNIFICANT CHANGE UP (ref 10.3–14.5)
SODIUM SERPL-SCNC: 121 MMOL/L — LOW (ref 135–145)
SODIUM SERPL-SCNC: 122 MMOL/L — LOW (ref 135–145)
WBC # BLD: 5.95 K/UL — SIGNIFICANT CHANGE UP (ref 3.8–10.5)
WBC # FLD AUTO: 5.95 K/UL — SIGNIFICANT CHANGE UP (ref 3.8–10.5)

## 2023-09-21 PROCEDURE — 74176 CT ABD & PELVIS W/O CONTRAST: CPT | Mod: 26

## 2023-09-21 PROCEDURE — 99497 ADVNCD CARE PLAN 30 MIN: CPT | Mod: 25

## 2023-09-21 PROCEDURE — 99223 1ST HOSP IP/OBS HIGH 75: CPT

## 2023-09-21 PROCEDURE — 99222 1ST HOSP IP/OBS MODERATE 55: CPT

## 2023-09-21 RX ORDER — BUMETANIDE 0.25 MG/ML
4 INJECTION INTRAMUSCULAR; INTRAVENOUS ONCE
Refills: 0 | Status: COMPLETED | OUTPATIENT
Start: 2023-09-21 | End: 2023-09-21

## 2023-09-21 RX ORDER — BUMETANIDE 0.25 MG/ML
2 INJECTION INTRAMUSCULAR; INTRAVENOUS EVERY 12 HOURS
Refills: 0 | Status: DISCONTINUED | OUTPATIENT
Start: 2023-09-21 | End: 2023-09-21

## 2023-09-21 RX ORDER — NIFEDIPINE 30 MG
60 TABLET, EXTENDED RELEASE 24 HR ORAL DAILY
Refills: 0 | Status: DISCONTINUED | OUTPATIENT
Start: 2023-09-21 | End: 2023-09-24

## 2023-09-21 RX ORDER — ATORVASTATIN CALCIUM 80 MG/1
40 TABLET, FILM COATED ORAL AT BEDTIME
Refills: 0 | Status: DISCONTINUED | OUTPATIENT
Start: 2023-09-21 | End: 2023-09-25

## 2023-09-21 RX ORDER — HYDRALAZINE HCL 50 MG
100 TABLET ORAL THREE TIMES A DAY
Refills: 0 | Status: DISCONTINUED | OUTPATIENT
Start: 2023-09-21 | End: 2023-09-24

## 2023-09-21 RX ORDER — PANTOPRAZOLE SODIUM 20 MG/1
40 TABLET, DELAYED RELEASE ORAL
Refills: 0 | Status: DISCONTINUED | OUTPATIENT
Start: 2023-09-21 | End: 2023-10-06

## 2023-09-21 RX ORDER — BUMETANIDE 0.25 MG/ML
1 INJECTION INTRAMUSCULAR; INTRAVENOUS
Qty: 20 | Refills: 0 | Status: DISCONTINUED | OUTPATIENT
Start: 2023-09-21 | End: 2023-09-22

## 2023-09-21 RX ORDER — BUMETANIDE 0.25 MG/ML
2 INJECTION INTRAMUSCULAR; INTRAVENOUS
Refills: 0 | Status: DISCONTINUED | OUTPATIENT
Start: 2023-09-21 | End: 2023-09-21

## 2023-09-21 RX ORDER — LEVOTHYROXINE SODIUM 125 MCG
125 TABLET ORAL DAILY
Refills: 0 | Status: DISCONTINUED | OUTPATIENT
Start: 2023-09-21 | End: 2023-10-06

## 2023-09-21 RX ORDER — ISOSORBIDE DINITRATE 5 MG/1
10 TABLET ORAL THREE TIMES A DAY
Refills: 0 | Status: DISCONTINUED | OUTPATIENT
Start: 2023-09-21 | End: 2023-09-24

## 2023-09-21 RX ADMIN — Medication 60 MILLIGRAM(S): at 07:13

## 2023-09-21 RX ADMIN — Medication 100 MILLIGRAM(S): at 18:00

## 2023-09-21 RX ADMIN — ISOSORBIDE DINITRATE 10 MILLIGRAM(S): 5 TABLET ORAL at 13:35

## 2023-09-21 RX ADMIN — Medication 100 MILLIGRAM(S): at 07:10

## 2023-09-21 RX ADMIN — BUMETANIDE 132 MILLIGRAM(S): 0.25 INJECTION INTRAMUSCULAR; INTRAVENOUS at 13:16

## 2023-09-21 RX ADMIN — HEPARIN SODIUM 5000 UNIT(S): 5000 INJECTION INTRAVENOUS; SUBCUTANEOUS at 06:50

## 2023-09-21 RX ADMIN — HEPARIN SODIUM 5000 UNIT(S): 5000 INJECTION INTRAVENOUS; SUBCUTANEOUS at 17:47

## 2023-09-21 RX ADMIN — Medication 3: at 17:44

## 2023-09-21 RX ADMIN — BUMETANIDE 5 MG/HR: 0.25 INJECTION INTRAMUSCULAR; INTRAVENOUS at 15:12

## 2023-09-21 RX ADMIN — HEPARIN SODIUM 5000 UNIT(S): 5000 INJECTION INTRAVENOUS; SUBCUTANEOUS at 22:44

## 2023-09-21 RX ADMIN — BUMETANIDE 5 MG/HR: 0.25 INJECTION INTRAMUSCULAR; INTRAVENOUS at 22:40

## 2023-09-21 RX ADMIN — Medication 2: at 13:39

## 2023-09-21 RX ADMIN — PANTOPRAZOLE SODIUM 40 MILLIGRAM(S): 20 TABLET, DELAYED RELEASE ORAL at 07:13

## 2023-09-21 RX ADMIN — Medication 1: at 09:31

## 2023-09-21 RX ADMIN — Medication 125 MICROGRAM(S): at 06:50

## 2023-09-21 RX ADMIN — ISOSORBIDE DINITRATE 10 MILLIGRAM(S): 5 TABLET ORAL at 23:07

## 2023-09-21 RX ADMIN — ATORVASTATIN CALCIUM 40 MILLIGRAM(S): 80 TABLET, FILM COATED ORAL at 22:40

## 2023-09-21 RX ADMIN — Medication 100 MILLIGRAM(S): at 22:40

## 2023-09-21 RX ADMIN — ISOSORBIDE DINITRATE 10 MILLIGRAM(S): 5 TABLET ORAL at 06:50

## 2023-09-21 RX ADMIN — BUMETANIDE 2 MILLIGRAM(S): 0.25 INJECTION INTRAMUSCULAR; INTRAVENOUS at 06:50

## 2023-09-21 NOTE — H&P ADULT - NSHPLABSRESULTS_GEN_ALL_CORE
8.9    9.19  )-----------( 307      ( 20 Sep 2023 17:46 )             26.7     122<L>  |  87<L>  |  110<H>  ----------------------------<  250<H>     09-20  4.9   |  15<L>  |  3.92<H>    Ca    9.2      20 Sep 2023 19:52    TPro  8.2  /  Alb  4.0  /  TBili  0.4  /  DBili  x   /  AST  29  /  ALT  23  /  AlkPhos  1093<H>  09-20        21:42 - VBG - pH: 7.20  | pCO2: 36    | pO2: 48    | Lactate: 0.9    19:52 - VBG - pH: 7.20  | pCO2: 38    | pO2: 43    | Lactate: 1.1    17:46 - VBG - pH: 7.23  | pCO2: 34    | pO2: 34    | Lactate: 1.6            hs Troponin, T - 64 ng/L (09-20-23 @ 19:52)  hs Troponin, T - 65 ng/L (09-20-23 @ 17:46)      Pro-BNP: 2092 (09-20-23 @ 19:52)  Pro-BNP: 2155 (09-20-23 @ 17:46)

## 2023-09-21 NOTE — PROGRESS NOTE ADULT - PROBLEM SELECTOR PLAN 2
Acute on chronic right sided CHF vs progressive renal failure. PE lower on differential given significant improvement with diuresis   -dimer elevated 3500s up from prior baseline of 300s   -duplex of lower extremity, V/Q pending   -troponin 65->64  -pro-BNP 2092   -RVP negative     # Acute hypoxemic respiratory failure   - s/p BIPAP in the ED, transitioned to high flow, now tolerating RA

## 2023-09-21 NOTE — H&P ADULT - PROBLEM SELECTOR PLAN 11
Full code- discussed options including resuscitation, antibiotics, IV fluids, antibiotics, dialysis.  Given MOLST form. Wants full code for now. Patient will complete when able. DVT prophylaxis: heparin 5000 units every 8 hours  GI prophylaxis: at home on pantoprazole 40 mg PO daily   Diet: Diabetes cardiac diet with restriction

## 2023-09-21 NOTE — H&P ADULT - PROBLEM SELECTOR PLAN 5
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  - Continue Synthroid 125.

## 2023-09-21 NOTE — PROGRESS NOTE ADULT - SUBJECTIVE AND OBJECTIVE BOX
Patient is a 58y old  Female who presents with a chief complaint of progressive dyspnea (21 Sep 2023 11:14)    Jimmy Teran MD   [facility]   Pager [phone]  Reachable on [employer] Teams     SUBJECTIVE / OVERNIGHT EVENTS:  Patient seen and examined this morning. Seen eating breakfast on RA without and shortness of breath. She states that her SOB is significantly improved.   No chest pain, fever, chills, abdominal pain, nausea or vomting.     MEDICATIONS  (STANDING):  atorvastatin 40 milliGRAM(s) Oral at bedtime  buMETAnide Infusion 1 mG/Hr (5 mL/Hr) IV Continuous <Continuous>  buMETAnide IVPB 4 milliGRAM(s) IV Intermittent once  dextrose 5%. 1000 milliLiter(s) (50 mL/Hr) IV Continuous <Continuous>  dextrose 5%. 1000 milliLiter(s) (100 mL/Hr) IV Continuous <Continuous>  dextrose 50% Injectable 12.5 Gram(s) IV Push once  dextrose 50% Injectable 25 Gram(s) IV Push once  dextrose 50% Injectable 25 Gram(s) IV Push once  glucagon  Injectable 1 milliGRAM(s) IntraMuscular once  heparin   Injectable 5000 Unit(s) SubCutaneous every 8 hours  hydrALAZINE 100 milliGRAM(s) Oral three times a day  insulin lispro (ADMELOG) corrective regimen sliding scale   SubCutaneous at bedtime  insulin lispro (ADMELOG) corrective regimen sliding scale   SubCutaneous three times a day before meals  isosorbide   dinitrate Tablet (ISORDIL) 10 milliGRAM(s) Oral three times a day  levothyroxine 125 MICROGram(s) Oral daily  NIFEdipine XL 60 milliGRAM(s) Oral daily  pantoprazole    Tablet 40 milliGRAM(s) Oral before breakfast    MEDICATIONS  (PRN):  dextrose Oral Gel 15 Gram(s) Oral once PRN Blood Glucose LESS THAN 70 milliGRAM(s)/deciliter  melatonin 3 milliGRAM(s) Oral at bedtime PRN Insomnia      Vital Signs Last 24 Hrs  T(C): 36.8 (21 Sep 2023 06:45), Max: 37 (21 Sep 2023 00:50)  T(F): 98.2 (21 Sep 2023 06:45), Max: 98.6 (21 Sep 2023 00:50)  HR: 66 (21 Sep 2023 06:45) (61 - 82)  BP: 150/81 (21 Sep 2023 06:45) (122/83 - 158/80)  BP(mean): 93 (20 Sep 2023 23:46) (93 - 93)  RR: 20 (21 Sep 2023 06:45) (16 - 20)  SpO2: 96% (21 Sep 2023 06:45) (95% - 100%)  CAPILLARY BLOOD GLUCOSE      POCT Blood Glucose.: 219 mg/dL (21 Sep 2023 12:41)  POCT Blood Glucose.: 152 mg/dL (21 Sep 2023 08:59)  POCT Blood Glucose.: 245 mg/dL (21 Sep 2023 00:06)  POCT Blood Glucose.: 249 mg/dL (20 Sep 2023 20:27)    I&O's Summary      General: woman sitting up in bed appears comfortable in NAD, awake and alert  Eyes: conjunctiva clear, nonicteric sclera  Respiratory: No respiratory distress, CTABL, No rales, rhonchi, wheezing.  Cardiovascular: S1,S2; Regular rate and rhythm; No m/g/r. 2+ peripheral pulses  Gastrointestinal: Soft, Nontender, Nondistended; +BS.   Extremities: B/l 2+ edema. No c/c; warm to touch  Psych:  appropriate mood and affect    LABS:                        8.7    5.95  )-----------( 225      ( 21 Sep 2023 11:27 )             25.9     09-21    121<L>  |  88<L>  |  111<H>  ----------------------------<  223<H>  4.3   |  15<L>  |  3.76<H>    Ca    9.1      21 Sep 2023 11:27  Phos  7.3     09-21  Mg     1.90     09-21    TPro  7.7  /  Alb  4.0  /  TBili  0.4  /  DBili  x   /  AST  29  /  ALT  25  /  AlkPhos  965<H>  09-21          Urinalysis Basic - ( 21 Sep 2023 11:27 )    Color: x / Appearance: x / SG: x / pH: x  Gluc: 223 mg/dL / Ketone: x  / Bili: x / Urobili: x   Blood: x / Protein: x / Nitrite: x   Leuk Esterase: x / RBC: x / WBC x   Sq Epi: x / Non Sq Epi: x / Bacteria: x        RADIOLOGY & ADDITIONAL TESTS:    Imaging Personally Reviewed:    Consultant(s) Notes Reviewed:      Care Discussed with Consultants/Other Providers:

## 2023-09-21 NOTE — PROGRESS NOTE ADULT - PROBLEM SELECTOR PLAN 8
BP controlled   -continue home Nifedipine 60mg qd and hydral 100 q8 with blood pressure parameters  -continue to monitor BP

## 2023-09-21 NOTE — H&P ADULT - PROBLEM SELECTOR PLAN 4
- Right heart cath on 1/23/2023 demonstrates PVR normal, elevated PCWP, mPAP 41, s/o group 2 PHTN  - s/p RHC 8/28  -diuresis as above  -HF consult in AM as above

## 2023-09-21 NOTE — H&P ADULT - ASSESSMENT
Ms. Hernandez is a 57 yo F w/ HTN, HLD, DM2, CKD (stage 3-4), hypothyroidism (c/b myxedema coma in past), severe pulmonary hypertension, mod-severe TR, asthma, HFpEF, presents for progressive dyspnea at rest with left sided chest tightness that started this morning.

## 2023-09-21 NOTE — PROGRESS NOTE ADULT - PROBLEM SELECTOR PLAN 6
In setting of CHF, likely hypervolemic hypernatremia   -will fluid restrict for now and diuresis   - nephro following

## 2023-09-21 NOTE — CONSULT NOTE ADULT - ASSESSMENT
58 y.o. F PMHx significant for CKD, pulmonary HTN, hypothyroidism, DM, HLD, HTN who presents to the ED with C.O. weakness, L-sided chest "heaviness", and SOB on exertion.  Pt was recently discharged on 12/30 after 18-day admission for acute on chronic CHF exacerbation.  Since that time, patient states she has generally not felt well.  States generalized fatigue and weakness since then.  Chest heaviness, has been intermittent, localized to the L. chest without radiation, worse when laying flat and on the left Decubital position. Pt also endorses SOB particularly on exertion. Over the last few days, pt also c.o. congestion and decreased PO intake.  She denies feeling sick with the flu or cold, denies fevers, N/V, ABD pain, dysuria, burning with urination, hematuria, she does report an episode of diarrhea this a.m. that was without blood.  Otherwise denies other myalgias or arthralgias. f note, this patient was discharged on Bumex 2 mg twice daily, hydralazine 100 mg 3 times daily and Isordil 10 mg 3 times daily. 58 year old Female with PMH of HTN, HLD,         58 year old Female with PMH of  HTN, HLD, DM2, asthma, CKD (baseline SCr ~2.0), hypothyroidism c/b myxedema coma in past), anemia requiring recent transfusions (followed by GI with plan for scope), and HFpEF with severe pulmonary hypertension (EF 64%;LVIDd 4.6 RVE with normal RV function, mod-severe TR and severe PH RVSP~65). Patient presented with c/o progressive worsening SOB/MARKS/PND and 3 pillow orthopnea X 5days; treated in the ED with IV Lasix 40mg followed by IV Bumex 2mg BID.        Pertinent Labs: BNP  1819     Lactate 1.0    Troponin levels    64/65    BUN/Cr  110/3.92   Na 122    TSH: T3/T4  Pending             K/L  (1/2023)  8.9/3.75 ratio 2.35  CXR: Pulmonary edema with small bilateral pleural effusions  EKG:  Normal sinus rhythm, possible Left atrial enlargement  TTE: Normal LV systolic function, Right ventricular enlargement with normal RV systolic function, Moderate-severe TR and PASP equals 65 consistent with PH.  RHC (8/23):  RA 17 PA 51/22/36  PCWP  25  CO/CI 6.7/4.2     and  PVR 2.6 POWERS       Home Meds:   Bumex 2mg BID    Hydralazine 100mg TID   ISDN 10mg TID  Procardia XL 60mg daily        Please give IV Bumex 4mg now followed by 1mg/hr    Hydralazine 100mg TID (hold SBP<90)  ISDN 10mg TID (hold SBP<90)  Strict I/O  Daily standing weights  Monitor lytes replete K>4.0 and Mg>2.0   Trend SCr  Please order thyroid function in AM  Management per primary team  Appreciate Nephrology recommendations  Pending final recommendations from HF attending.

## 2023-09-21 NOTE — H&P ADULT - NSICDXPASTMEDICALHX_GEN_ALL_CORE_FT
PAST MEDICAL HISTORY:  (HFpEF) heart failure with preserved ejection fraction     CKD (chronic kidney disease)     DM (diabetes mellitus)     H/O pulmonary hypertension     HLD (hyperlipidemia)     HTN (hypertension)     Hypothyroid

## 2023-09-21 NOTE — PATIENT PROFILE ADULT - HAVE YOU RECENTLY LOST WEIGHT WITHOUT TRYING?
Physical Exam: 


SUBJECTIVE: Patient seen and examined








OBJECTIVE:





 Vital Signs











 Period  Temp  Pulse  Resp  BP Sys/Grewal  Pulse Ox


 


 Last 24 Hr  98.5 F-99.1 F    18-20  114-124/49-64  97-99











GENERAL: The patient is awake, alert, and fully oriented, in no acute distress.


HEAD: Normal with no signs of trauma.


EYES: PERRL, extraocular movements intact, sclera anicteric, conjunctiva clear. 

No ptosis. 


ENT: Ears normal, nares patent, oropharynx clear without exudates, moist mucous 


membranes.


NECK: Trachea midline, full range of motion, supple. 


LUNGS: Breath sounds equal, clear to auscultation bilaterally, no wheezes, no 

crackles, no 


accessory muscle use. 


HEART: Regular rate and rhythm, S1, S2 without murmur, rub or gallop.


ABDOMEN: Soft, nontender, nondistended, normoactive bowel sounds, no guarding, 

no 


rebound, no hepatosplenomegaly, no masses.


EXTREMITIES: 2+ pulses, warm, well-perfused, no edema. 


NEUROLOGICAL: Cranial nerves II through XII grossly intact. Normal speech, gait 

not 


observed.


PSYCH: Normal mood, normal affect.


SKIN: Warm, dry, normal turgor, no rashes or lesions noted














 Laboratory Results - last 24 hr











  06/05/18 06/05/18 06/05/18





  14:17 14:17 14:17


 


WBC  6.9  


 


RBC  2.37 L  


 


Hgb  7.6 L D  


 


Hct  22.0 L D  


 


MCV  93.1  


 


MCH  31.9  


 


MCHC  34.3  


 


RDW  13.3  


 


Plt Count  278  


 


MPV  7.4 L  


 


Absolute Neuts (auto)  5.5  


 


Neutrophils %  78.6  


 


Lymphocytes %  12.8  


 


Monocytes %  7.8  


 


Eosinophils %  0.5  


 


Basophils %  0.3  


 


PT with INR   


 


INR   


 


Sodium   130 L 


 


Potassium   3.2 L 


 


Chloride   99 


 


Carbon Dioxide   24 


 


Anion Gap   7 L 


 


BUN   24 H 


 


Creatinine   1.0 


 


Creat Clearance w eGFR   54.35 


 


Random Glucose   122 H 


 


Calcium   7.5 L 


 


Total Bilirubin   < 0.5 


 


AST   30  D 


 


ALT   17  D 


 


Alkaline Phosphatase   49 


 


Total Protein   5.4 L 


 


Albumin   2.7 L 


 


Blood Type    O POSITIVE


 


Antibody Screen    Negative














  06/05/18 06/05/18 06/05/18





  14:17 14:25 22:55


 


WBC    6.3


 


RBC    2.31 L


 


Hgb    7.4 L


 


Hct    21.5 L


 


MCV    92.9


 


MCH    32.2


 


MCHC    34.6


 


RDW    13.2


 


Plt Count    267


 


MPV    7.5


 


Absolute Neuts (auto)    4.4


 


Neutrophils %    68.8


 


Lymphocytes %    19.5


 


Monocytes %    10.1


 


Eosinophils %    1.2


 


Basophils %    0.4


 


PT with INR  12.1  


 


INR  1.08  


 


Sodium   


 


Potassium   


 


Chloride   


 


Carbon Dioxide   


 


Anion Gap   


 


BUN   


 


Creatinine   


 


Creat Clearance w eGFR   


 


Random Glucose   


 


Calcium   


 


Total Bilirubin   


 


AST   


 


ALT   


 


Alkaline Phosphatase   


 


Total Protein   


 


Albumin   


 


Blood Type   O POSITIVE 


 


Antibody Screen   








Active Medications











Generic Name Dose Route Start Last Admin





  Trade Name Freq  PRN Reason Stop Dose Admin


 


Acetaminophen  650 mg  06/05/18 19:30  06/06/18 06:39





  Tylenol -  PO   650 mg





  Q6H [facility]   Administration





     





     





     





     


 


Ascorbic Acid  1,000 mg  06/06/18 10:00  





  Vitamin C -  PO   





  DAILY [facility]   





     





     





     





     


 


Aspirin  325 mg  06/05/18 22:00  06/05/18 21:40





  Asa -  PO   325 mg





  BID [facility]   Administration





     





     





     





     


 


Atorvastatin Calcium  10 mg  06/05/18 22:00  06/05/18 21:40





  Lipitor -  PO   10 mg





  HS [facility]   Administration





     





     





     





     


 


Cholecalciferol  2,000 unit  06/06/18 10:00  





  Vitamin D3 -  PO   





  DAILY [facility]   





     





     





     





     


 


Levothyroxine Sodium 25 mcg/  137 mcg  06/06/18 07:00  06/06/18 06:39





  Levothyroxine Sodium 112 mcg  PO   137 mcg





  DAILY@0700 [facility]   Administration





     





     





     





     


 


Magnesium Hydroxide  30 ml  06/05/18 23:25  





  Milk Of Magnesia -  PO   





  TID PRN   





  [facility]   





     





     





     


 


Metoprolol Succinate  50 mg  06/06/18 10:00  





  Toprol Xl -  PO   





  DAILY [facility]   





     





     





     





     


 


Multivitamins/Minerals  1 each  06/06/18 10:00  





  Theragran-M  PO   





  DAILY [facility]   





     





     





     





     


 


Oxycodone HCl  5 mg  06/05/18 19:20  06/05/18 23:56





  Roxicodone -  PO   5 mg





  Q6H PRN   Administration





  PAIN LEVEL 7 - 10   





     





     





     


 


Pantoprazole Sodium  40 mg  06/06/18 10:00  





  Protonix -  PO   





  DAILY [facility]   





     





     





     





     











ASSESSMENT/PLAN:
Unsure (2)

## 2023-09-21 NOTE — PROGRESS NOTE ADULT - PROBLEM SELECTOR PLAN 1
#Acute CHFpEF   - TTE 8/6/23 with LVEF 69%, mod-sev TR, severe pulmonary HTN, BNP 6228  - at home on bumex 2 mg BID, giving IV Bumex IV 4mg stat and then on gtt   - s/p RHC 8/28, elevated PCWP improved with diuresis   - TTE pending   - HF consult appreciated, possible for CardioMEMS  - monitor on tele

## 2023-09-21 NOTE — H&P ADULT - NSHPPHYSICALEXAM_GEN_ALL_CORE
Vital Signs Last 24 Hrs  T(C): 37 (21 Sep 2023 00:50), Max: 37 (21 Sep 2023 00:50)  T(F): 98.6 (21 Sep 2023 00:50), Max: 98.6 (21 Sep 2023 00:50)  HR: 61 (21 Sep 2023 00:50) (61 - 82)  BP: 136/75 (21 Sep 2023 00:50) (122/83 - 158/80)  BP(mean): 93 (20 Sep 2023 23:46) (93 - 93)  RR: 20 (21 Sep 2023 04:30) (16 - 20)  SpO2: 95% (21 Sep 2023 04:30) (95% - 100%)    Parameters below as of 21 Sep 2023 04:30  Patient On (Oxygen Delivery Method): nasal cannula, high flow  O2 Flow (L/min): 40  O2 Concentration (%): 25    CONSTITUTIONAL: Well-groomed, in no apparent distress  EYES: No conjunctival or scleral injection, non-icteric; PERRLA and symmetric  ENMT: No external nasal lesions; nasal mucosa not inflamed; normal dentition;  oral mucosa with moist membranes  NECK: Trachea midline without palpable neck mass; thyroid not enlarged and non-tender  RESPIRATORY: Breathing comfortably;  lungs CTA with crackles audible bilaterally, sating well on RA  CARDIOVASCULAR: +S1S2, RRR, no M/G/R; pedal pulses full and symmetric; 2+ lower extremity edema bilaterally   GASTROINTESTINAL: No palpable masses or tenderness, +BS throughout, no rebound/guarding; no hepatosplenomegaly; no hernia palpated, firm distended abdomen  MUSCULOSKELETAL:  no digital clubbing or cyanosis; no paraspinal tenderness;  normal strength and tone of extremities  SKIN: No rashes or ulcers noted; no subcutaneous nodules or induration palpable  NEUROLOGIC: CN II-XII intact; sensation intact in LEs b/l to light touch  PSYCHIATRIC: A+O x 3; mood and affect appropriate; appropriate insight and judgment

## 2023-09-21 NOTE — H&P ADULT - PROBLEM SELECTOR PLAN 8
- continue Nifedipine 60mg qd and hydral 100 q8 with blood pressure parameters  -continue to monitor BP

## 2023-09-21 NOTE — CONSULT NOTE ADULT - PROBLEM SELECTOR RECOMMENDATION 2
Pt with hx of hyponatremia p/w Na of 123. Pt was recently hospitalized and also had hyponatremia with Na of 127 requiring 3% HTS. Na improved with the aforementioned treatment. Last Na level on 08/30 was 133. Pt with Na of 122. Pt w hyponatremia likely due to fluid overload. Would recommend to continue Bumex 2 mg IV BID. do not correct by more than 6-8 meq in 24hr. No labs are available today. Goal of Na is 130 by 6 pm today. BMP q12h. send serum osm, urine osm, urine na. Pt with hx of hyponatremia p/w Na of 123. Pt was recently hospitalized and also had hyponatremia with Na of 127 requiring 3% HTS. Na improved with the aforementioned treatment. Last Na level on 08/30 was 133. Pt with Na of 121 today. Pt w hyponatremia likely due to fluid overload. Would recommend to continue Bumex gtt. do not correct by more than 6-8 meq in 24hr. No labs are available today. Goal of Na is 130 by 6 pm today. BMP q12h. send serum osm, urine osm, urine na. Pt. with hypervolemia hyponatremia. Last SNa low at 121. Pt. initiated on IV Bumex infusion for hypervolemia management. Recommend fluid restriction (<1 L/day). Monitor SNa q8 hrs. Avoid overcorrection of SNa.

## 2023-09-21 NOTE — CONSULT NOTE ADULT - PROBLEM SELECTOR RECOMMENDATION 9
Pt with ISAMAR on CKD stage 3-4 with baseline Scr of 2 as of 06/2023 as per HIE presents to the hospital with Scr of 3.92. Pt was recently hospitalized for ADHF and had ISAMAR as well during that admission due to fluid overload reqruing diuresis. Last Scr on 08/30 was 3.30. Pt was discharged on Bumex 2 mg BID. Pt p/w SOB, dyspnea and orthopnea similar to previous admission. Xray showed pulm edema and b/l pleural effusion. Physical exam shows b/l LE edema and b/l crackles on lung bases, saturating well on 2 LNC. Pt has ISAMAR on CKD likely due to ADHF and fluid overload. Pt received lasix 80 mg in the ED. Pt was started on Bumex 2 mg IV BID. Would recommend to continue aggressively diureses with Bumex 2 mg BID. Monitor BMP daily. strict I/Os. Dose meds per eGFR. avoid nephrotoxic agents. Pt with ISAMAR on CKD stage 3-4 with baseline Scr of 2 as of 06/2023 as per HIE presents to the hospital with Scr of 3.92. Pt was recently hospitalized for ADHF and had ISAMAR as well during that admission due to fluid overload reqruing diuresis. Last Scr on 08/30 was 3.30. Pt was discharged on Bumex 2 mg BID. Pt p/w SOB, dyspnea and orthopnea similar to previous admission. Xray showed pulm edema and b/l pleural effusion. Physical exam shows b/l LE edema and b/l crackles on lung bases, saturating well on 2 LNC. Today Scr is 3.76 slightly improved. Pt has ISAMAR on CKD likely due to ADHF and fluid overload. Pt received lasix 80 mg in the ED. Pt was started on Bumex 2 mg IV BID. Would recommend to continue aggressively diureses with Bumex gtt. Monitor BMP daily. strict I/Os. Dose meds per eGFR. avoid nephrotoxic agents. Pt. with ISAMAR on CKD in setting of HF and fluid overload. On review of labs on [place], Scr elevated at 3.31 on 8/30/23, increased to 3.92 on 9/20/23. Scr elevated/stable at 3.76 today. Pt. initiated on IV Bumex infusion for hypervolemia management. Monitor labs and urine output. Avoid any potential nephrotoxins. Dose medications as per eGFR

## 2023-09-21 NOTE — H&P ADULT - PROBLEM SELECTOR PLAN 12
Full code- discussed options including resuscitation, antibiotics, IV fluids, antibiotics, dialysis.  Given MOLST form. Wants full code for now. Patient will complete when able.

## 2023-09-21 NOTE — H&P ADULT - PROBLEM SELECTOR PLAN 7
secondary to anemia of chronic disease  - Prior iron studies demonstrate elevated ferritin, consistent with anemia of chronic disease with suspected component of anemia of CKD  - continue to monitor stable currently.   - Transfuse if Hgb <7.

## 2023-09-21 NOTE — H&P ADULT - PROBLEM SELECTOR PLAN 1
Acute on chronic right sided CHF vs progressive renal failure vs PE (will need to r/o with PE, but given kidney dysfunction will do dopplers of LE first to r/o DVT given high risk of ESRD with contrast).   -STAT duplex of lower extremity reordered to r/o DVT  -troponin 65->64  -pro-BNP 2092   -RVP negative   -TTE ordered   -received 80 mg IV lasix, with minimal response will start on bumex 2 mg IV BID and adjust based on urine output

## 2023-09-21 NOTE — PHARMACOTHERAPY INTERVENTION NOTE - COMMENTS
Medication list in Outpatient Medication Review ([facility]) verified with outpatient pharmacy.   Of Note:   - Pharmacy has on file: Hydralazine 100mg Q8H (filled 8/14/23 x 1 month) and Hydralazine 50mg Q8H (filled on 9/3/23 x 1 month) ?

## 2023-09-21 NOTE — CONSULT NOTE ADULT - NS ATTEND AMEND GEN_ALL_CORE FT
58F with HFpEF and severe post-capillary pHTN, p/w dyspnea at rest and w/ exertion, orthopnea, PND, hyponatremia, elevated BUN/SCR, and radiographic signs of pulm edema. Denies med or dietary noncompliance. This is her 6th hospitalization this year.  On exam, has JVD. 1+ LE edema. O2 sat ~97%.  Give Bumex 4 mg iv x1 now, followed by drip at 1 mg/hr.  Continue other home BP meds.  May benefit from CardioMEMS.

## 2023-09-21 NOTE — H&P ADULT - HISTORY OF PRESENT ILLNESS
Ms. 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 She reports this morning she had acute shortness of breath at rest with associated weakness. She reports having left sided chest tightness that worsened when laying flat and in the decubitus position. She reports SOB on exertion as well. She reports over last few days she has had non-productive cough, decrease PO intake, nausea this morning, and diarrhea (without blood) this morning. She reports not eating much over the last few days with decrease PO intake. She reports taking her bumex today (both AM and PM). She was placed on BIPAP in the ED. CXR was sig for the following: small bilateral pleural effusions with prominent interstitial markings bilaterally. Labs were sig for the following: BUN/Cr 110/3.92, Na 122 (last 133 in August), CO2 15, AG 20, -290, d-dimer 3509, WBC 9.19, Hb 8.9/26.7, (8.2/1245), MCV 75.4, RDW 14.6, neutrophil count 87.1%, alk phos 1093, pro-BNP 2092, LA 0.9, pCO2 36. She denies any abdominal pain, dysuria, urinary frequency, fever or chills.

## 2023-09-21 NOTE — CONSULT NOTE ADULT - SUBJECTIVE AND OBJECTIVE BOX
Patient is a 58y old  Female who presents with a chief complaint of progressive dyspnea (21 Sep 2023 05:13)      HPI:    PAST MEDICAL & SURGICAL HISTORY:  HTN (hypertension)      HLD (hyperlipidemia)      DM (diabetes mellitus)      Hypothyroid      H/O pulmonary hypertension      CKD (chronic kidney disease)      (HFpEF) heart failure with preserved ejection fraction      S/P cataract surgery          FAMILY HISTORY:  FH: stroke (Father)        Home Medications:      Medications:  atorvastatin 40 milliGRAM(s) Oral at bedtime  buMETAnide Injectable 2 milliGRAM(s) IV Push two times a day  dextrose 5%. 1000 milliLiter(s) IV Continuous <Continuous>  dextrose 5%. 1000 milliLiter(s) IV Continuous <Continuous>  dextrose 50% Injectable 25 Gram(s) IV Push once  dextrose 50% Injectable 25 Gram(s) IV Push once  dextrose 50% Injectable 12.5 Gram(s) IV Push once  dextrose Oral Gel 15 Gram(s) Oral once PRN  glucagon  Injectable 1 milliGRAM(s) IntraMuscular once  heparin   Injectable 5000 Unit(s) SubCutaneous every 8 hours  hydrALAZINE 100 milliGRAM(s) Oral three times a day  insulin lispro (ADMELOG) corrective regimen sliding scale   SubCutaneous three times a day before meals  insulin lispro (ADMELOG) corrective regimen sliding scale   SubCutaneous at bedtime  isosorbide   dinitrate Tablet (ISORDIL) 10 milliGRAM(s) Oral three times a day  levothyroxine 125 MICROGram(s) Oral daily  melatonin 3 milliGRAM(s) Oral at bedtime PRN  NIFEdipine XL 60 milliGRAM(s) Oral daily  pantoprazole    Tablet 40 milliGRAM(s) Oral before breakfast      Review of systems:  10 point review of systems completed and are negative unless noted in HPI    Vitals:  T(C): 36.8 (09-21-23 @ 06:45), Max: 37 (09-21-23 @ 00:50)  HR: 66 (09-21-23 @ 06:45) (61 - 82)  BP: 150/81 (09-21-23 @ 06:45) (122/83 - 158/80)  BP(mean): 93 (09-20-23 @ 23:46) (93 - 93)  RR: 20 (09-21-23 @ 06:45) (16 - 20)  SpO2: 96% (09-21-23 @ 06:45) (95% - 100%)    Daily Height in cm: 157.48 (20 Sep 2023 15:40)    Daily         I&O's Summary      Physical Exam:  Appearance: No Acute Distress  Neck: JVP  Cardiovascular: Normal S1 S2  Respiratory: Clear to auscultation bilaterally  Gastrointestinal: Soft, Non-tender	  Skin: No cyanosis	  Neurologic: Non-focal  Extremities: No LE edema  Psychiatry: A & O x 3, Mood & affect appropriate    Labs:                        8.9    9.19  )-----------( 307      ( 20 Sep 2023 17:46 )             26.7     09-20    122<L>  |  87<L>  |  110<H>  ----------------------------<  250<H>  4.9   |  15<L>  |  3.92<H>    Ca    9.2      20 Sep 2023 19:52  Phos  7.3     09-20  Mg     1.80     09-20    TPro  8.2  /  Alb  4.0  /  TBili  0.4  /  DBili  x   /  AST  29  /  ALT  23  /  AlkPhos  1093<H>  09-20        TELEMETRY:    Echocardiogram:    < from: Transthoracic Echocardiogram (08.06.23 @ 12:30) >  ------------------------------------------------------------------------  DIMENSIONS:  Dimensions:     Normal Values:  LA:     3.6 cm    2.0 - 4.0 cm  Ao:     2.2 cm    2.0 - 3.8 cm  SEPTUM: 0.7 cm    0.6 - 1.2 cm  PWT:    0.9 cm    0.6 - 1.1 cm  LVIDd:  4.6 cm    3.0 - 5.6 cm  LVIDs:  2.6 cm    1.8 - 4.0 cm  Derived Variables:  LVMI: 72 g/m2  RWT: 0.39  Fractional short: 43 %  Ejection Fraction (Modified Macedo Rule): 69 %  ------------------------------------------------------------------------  OBSERVATIONS:  Mitral Valve: Normal mitral valve. Minimal mitral  regurgitation.  Aortic Root: Normal aortic root.  Aortic Valve: Normal trileaflet aortic valve.  Left Atrium: Moderately dilated left atrium.  LA volume  index = 45 cc/m2.  Left Ventricle: Normal left ventricular systolic function.  No segmental wall motion abnormalities. Normal left  ventricular internal dimensions and wall thicknesses.  (DT:152 ms).  Right Heart: Normal right atrium. Right ventricular  enlargement with normal right ventricular systolic  function. Normal tricuspid valve.  Moderate-severe  tricuspid regurgitation. Normal pulmonic valve. Moderate  pulmonic regurgitation.  Pericardium/PleuraNormal pericardium with trace pericardial  effusion. Right pleural effusion.  Hemodynamic: Estimated right ventricular systolic pressure  equals 65 mm Hg, assuming right atrial pressure equals 10  mm Hg, consistent with severe pulmonary hypertension.  ------------------------------------------------------------------------  CONCLUSIONS:  1. Normal left ventricular systolic function. No segmental  wall motion abnormalities.  2. Right ventricular enlargement with normal right  ventricular systolic function.  3. Normal tricuspid valve.Moderate-severe tricuspid  regurgitation.  4. Estimated pulmonary artery systolic pressure equals 65  mm Hg, assuming right atrial pressure equals 10  mm Hg,  consistent with severe pulmonary hypertension.  *** Compared with echocardiogram of 2/27/2023, no  significant changes noted    < end of copied text >      EKG: Patient is a 58y old  Female who presents with a chief complaint of progressive dyspnea (21 Sep 2023 05:13)      HPI:  58 year old Female with PMH of  HTN, HLD, DM2, asthma, CKD (baseline SCr ~2.0), hypothyroidism c/b myxedema coma in past), anemia requiring recent transfusions (followed by GI with plan for scope), and HFpEF with severe pulmonary hypertension (EF 64%;LVIDd 4.6 RVE with normal RV function, mod-severe TR and severe PH RVSP~65). Patient presented with c/o progressive worsening SOB/MARKS/PND and 3 pillow orthopnea X 5days; treated in the ED with IV Lasix 40mg followed by IV Bumex 2mg BID.            PAST MEDICAL & SURGICAL HISTORY:  HTN (hypertension)      HLD (hyperlipidemia)      DM (diabetes mellitus)      Hypothyroid      H/O pulmonary hypertension      CKD (chronic kidney disease)      (HFpEF) heart failure with preserved ejection fraction      S/P cataract surgery          FAMILY HISTORY:  FH: stroke (Father)        Home Medications:      Medications:  atorvastatin 40 milliGRAM(s) Oral at bedtime  buMETAnide Injectable 2 milliGRAM(s) IV Push two times a day  dextrose 5%. 1000 milliLiter(s) IV Continuous <Continuous>  dextrose 5%. 1000 milliLiter(s) IV Continuous <Continuous>  dextrose 50% Injectable 25 Gram(s) IV Push once  dextrose 50% Injectable 25 Gram(s) IV Push once  dextrose 50% Injectable 12.5 Gram(s) IV Push once  dextrose Oral Gel 15 Gram(s) Oral once PRN  glucagon  Injectable 1 milliGRAM(s) IntraMuscular once  heparin   Injectable 5000 Unit(s) SubCutaneous every 8 hours  hydrALAZINE 100 milliGRAM(s) Oral three times a day  insulin lispro (ADMELOG) corrective regimen sliding scale   SubCutaneous three times a day before meals  insulin lispro (ADMELOG) corrective regimen sliding scale   SubCutaneous at bedtime  isosorbide   dinitrate Tablet (ISORDIL) 10 milliGRAM(s) Oral three times a day  levothyroxine 125 MICROGram(s) Oral daily  melatonin 3 milliGRAM(s) Oral at bedtime PRN  NIFEdipine XL 60 milliGRAM(s) Oral daily  pantoprazole    Tablet 40 milliGRAM(s) Oral before breakfast      Review of systems:  10 point review of systems completed and are negative unless noted in HPI    Vitals:  T(C): 36.8 (09-21-23 @ 06:45), Max: 37 (09-21-23 @ 00:50)  HR: 66 (09-21-23 @ 06:45) (61 - 82)  BP: 150/81 (09-21-23 @ 06:45) (122/83 - 158/80)  BP(mean): 93 (09-20-23 @ 23:46) (93 - 93)  RR: 20 (09-21-23 @ 06:45) (16 - 20)  SpO2: 96% (09-21-23 @ 06:45) (95% - 100%)    Daily Height in cm: 157.48 (20 Sep 2023 15:40)    Daily         I&O's Summary      Physical Exam:  Appearance: No Acute Distress  Neck: JVP to mandible  Cardiovascular: Normal S1 S2  Respiratory: Clear to auscultation with fine bibasilar rales diminished  bilateral bases   Gastrointestinal: Soft, Non-tender, distended  	  Skin: No cyanosis	  Neurologic: Non-focal  Extremities: No LE edema  Psychiatry: A & O x 3, Mood & affect appropriate    Labs:                        8.9    9.19  )-----------( 307      ( 20 Sep 2023 17:46 )             26.7     09-20    122<L>  |  87<L>  |  110<H>  ----------------------------<  250<H>  4.9   |  15<L>  |  3.92<H>    Ca    9.2      20 Sep 2023 19:52  Phos  7.3     09-20  Mg     1.80     09-20    TPro  8.2  /  Alb  4.0  /  TBili  0.4  /  DBili  x   /  AST  29  /  ALT  23  /  AlkPhos  1093<H>  09-20    Pro-Brain Natriuretic Peptide: 1819: (09.21.23 @ 11:27)    Blood Gas Venous - Lactate: 0.9 mmol/L (09.20.23 @ 21:42)    Troponin T, High Sensitivity Result: 64:  (09.20.23 @ 19:52)      D-Dimer Assay, Quantitative: 3509: (09.20.23 @ 17:46)      Xray Chest 1 View- PORTABLE-Urgent (Xray Chest 1 View- PORTABLE-Urgent .) (09.20.23 @ 20:29) >  FINDINGS:    No focal consolidations.  Small bilateral pleural effusions. No pneumothorax.  Cardiomediastinal silhouette is poorly evaluated on this projection.  No acute bony pathology.    IMPRESSION:    Pulmonary edema with small bilateral pleural effusions.          12 Lead ECG (08.11.23 @ 17:30)     Ventricular Rate 75 BPM    Atrial Rate 75 BPM    P-R Interval 178 ms    QRS Duration 78 ms    Q-T Interval 386 ms    QTC Calculation(Bazett) 431 ms    P Axis 50 degrees    R Axis -9 degrees    T Axis 63 degrees    Diagnosis Line Normal sinus rhythm  Possible Left atrial enlargement  Borderline ECG        TELEMETRY: Sinus Rhythm      Echocardiogram:    Transthoracic Echocardiogram (08.06.23 @ 12:30)   ------------------------------------------------------------------------  DIMENSIONS:  Dimensions:     Normal Values:  LA:     3.6 cm    2.0 - 4.0 cm  Ao:     2.2 cm    2.0 - 3.8 cm  SEPTUM: 0.7 cm    0.6 - 1.2 cm  PWT:    0.9 cm    0.6 - 1.1 cm  LVIDd:  4.6 cm    3.0 - 5.6 cm  LVIDs:  2.6 cm    1.8 - 4.0 cm  Derived Variables:  LVMI: 72 g/m2  RWT: 0.39  Fractional short: 43 %  Ejection Fraction (Modified Macedo Rule): 69 %  ------------------------------------------------------------------------  OBSERVATIONS:  Mitral Valve: Normal mitral valve. Minimal mitral  regurgitation.  Aortic Root: Normal aortic root.  Aortic Valve: Normal trileaflet aortic valve.  Left Atrium: Moderately dilated left atrium.  LA volume  index = 45 cc/m2.  Left Ventricle: Normal left ventricular systolic function.  No segmental wall motion abnormalities. Normal left  ventricular internal dimensions and wall thicknesses.  (DT:152 ms).  Right Heart: Normal right atrium. Right ventricular  enlargement with normal right ventricular systolic  function. Normal tricuspid valve.  Moderate-severe  tricuspid regurgitation. Normal pulmonic valve. Moderate  pulmonic regurgitation.  Pericardium/PleuraNormal pericardium with trace pericardial  effusion. Right pleural effusion.  Hemodynamic: Estimated right ventricular systolic pressure  equals 65 mm Hg, assuming right atrial pressure equals 10  mm Hg, consistent with severe pulmonary hypertension.  ------------------------------------------------------------------------  CONCLUSIONS:  1. Normal left ventricular systolic function. No segmental  wall motion abnormalities.  2. Right ventricular enlargement with normal right  ventricular systolic function.  3. Normal tricuspid valve.Moderate-severe tricuspid  regurgitation.  4. Estimated pulmonary artery systolic pressure equals 65  mm Hg, assuming right atrial pressure equals 10  mm Hg,  consistent with severe pulmonary hypertension.  *** Compared with echocardiogram of 2/27/2023, no  significant changes noted      Cardiac Catheterization (08.28.23 @ 09:32)     Hemodynamic Pressures:     Phase          Location           [mmHg]                [mmHg]  [mmHg]            HR  Baseline       Ao                   s      144                d  58                m       92       68  Baseline       RV                   s       57  ed          19          59  Baseline       PA                   s       51                d  22                m       36          68  Baseline       PCW                  a       27      v  39                m       25          68  Baseline       RA                   a       20                 v  21                m       17          69            Oxygen Saturations   Phase          Location        Hb             Sat            pO2  Content        Group  Baseline        AO                         7              99  9.2   SA  Baseline        PA                         7       67  6.2   PA    Oxygen Saturation Average, Phase: Baseline   PV Hb                PV Sat                 PV pO2  PV Content  SA Hb      6.80 g/dL SA Sat     99.00 %     SA pO2  SA Content     9.16 %  PA Hb      6.80 g/dL PA Sat     67.20 %PA pO2  PA Content     6.21 %  MV Hb                MV Sat                 MV pO2  MV Content  Strokework:   Phase:                    Baseline   LVSW:                     89.58 g*m                  LVSW (index):  56.96 g*m/m2  RVSW:   25.40 g*m                  RVSW (index):  16.15 g*m/m2  Flow Calculations, Phase: Baseline   CO                    6.69 l/min    HR  O2Insp  CI                 4.25 l/min/m2    PO2  O2Exp  SV                                  VO2                 196.60 ml/min  O2(ExAir)  SVI                                 A-VO2  Hb                  6.80 gm/d  Shunts:   Qs                    6.69 l/min    Qs Index              4.25  l/min/m2  Qp                    6.69 l/min    Qp Index              4.25  l/min/m2    Qp/Qs  EPBF                                EPBF Index   L-R                                 L-R Index   R-L                                 R-L Index   Systemic Resistances, Phase: Baseline   SVR              897.29 dyn*s/cm5                   TVR  1100.68 dyn*s/cm5  SVRI             1411.26 dyn*s*m2/cm5               TVRI  1731.15 dyn*s*m2/cm5  SVR              11.22 POWERS                           TVR  13.76 POWERS  SVRI             17.65 POWERS*m2                        TVRI  21.64 POWERS*m2  PulmonaryResistances:   PVR              131.60 dyn*s/cm5                   TPR  430.70 dyn*s/cm5  PVRI             206.99 dyn*s*m2/cm5                TPRI  677.41 dyn*s*m2/cm5  PVR              1.65 POWERS                            TPR  5.39 POWERS  PVRI 2.59 POWERS*m2                         TPRI  8.47 POWERS*m2

## 2023-09-21 NOTE — CONSULT NOTE ADULT - SUBJECTIVE AND OBJECTIVE BOX
[facility] DIVISION OF KIDNEY DISEASES AND HYPERTENSION -- [phone]  -- INITIAL CONSULT NOTE  --------------------------------------------------------------------------------  HPI:  Pt is a 59 yo F w/ PMH of HTN, HLD, DM2, CKD (stage 3-4), hypothyroidism (c/b myxedema coma in past), severe pulmonary hypertension, mod-severe TR, asthma, HFpEF, presents for progressive dyspnea at rest with left sided chest tightness that started the day of admission. She reports that she had acute shortness of breath at rest. She reports having left sided chest tightness that worsened when laying flat and in the decubitus position. She reports SOB on exertion as well. She reports over last few days she has had non-productive cough, decrease PO intake. She states that she is compliant with her bumex medication. Pt was initially placed on BIPAP in the ED. CXR showed pulm edema with small bilateral pleural effusions. She denies any abdominal pain, dysuria, urinary frequency, fever or chills. Pt was found to have hyponatremia with Na of 123 (133 on Aug 30th). Also pt was found to have elevated creatinine with Scr of 3.93 (3.3 on Aug 30th). Nephro was consulted for ISAMAR on CKD and hyponatremia.     pt. seen and examined at bedside, she is complaining of dyspnea and SOB at rest. She states that she has dyspnea when she lays flat as well. She denies any fever, chills, CP. She is saturating well on 2 L NC.         PAST HISTORY  --------------------------------------------------------------------------------  PAST MEDICAL & SURGICAL HISTORY:  HTN (hypertension)      HLD (hyperlipidemia)      DM (diabetes mellitus)      Hypothyroid      H/O pulmonary hypertension      CKD (chronic kidney disease)      (HFpEF) heart failure with preserved ejection fraction      S/P cataract surgery        FAMILY HISTORY:  FH: stroke (Father)      PAST SOCIAL HISTORY:  Denies smoking, alcohol, illicit drug use. Lives at home with her family. She is a retired HHA.   ALLERGIES & MEDICATIONS  --------------------------------------------------------------------------------  Allergies    No Known Allergies    Intolerances      Standing Inpatient Medications  atorvastatin 40 milliGRAM(s) Oral at bedtime  buMETAnide Injectable 2 milliGRAM(s) IV Push two times a day  dextrose 5%. 1000 milliLiter(s) IV Continuous <Continuous>  dextrose 5%. 1000 milliLiter(s) IV Continuous <Continuous>  dextrose 50% Injectable 25 Gram(s) IV Push once  dextrose 50% Injectable 25 Gram(s) IV Push once  dextrose 50% Injectable 12.5 Gram(s) IV Push once  glucagon  Injectable 1 milliGRAM(s) IntraMuscular once  heparin   Injectable 5000 Unit(s) SubCutaneous every 8 hours  hydrALAZINE 100 milliGRAM(s) Oral three times a day  insulin lispro (ADMELOG) corrective regimen sliding scale   SubCutaneous three times a day before meals  insulin lispro (ADMELOG) corrective regimen sliding scale   SubCutaneous at bedtime  isosorbide   dinitrate Tablet (ISORDIL) 10 milliGRAM(s) Oral three times a day  levothyroxine 125 MICROGram(s) Oral daily  NIFEdipine XL 60 milliGRAM(s) Oral daily  pantoprazole    Tablet 40 milliGRAM(s) Oral before breakfast    PRN Inpatient Medications  dextrose Oral Gel 15 Gram(s) Oral once PRN  melatonin 3 milliGRAM(s) Oral at bedtime PRN      REVIEW OF SYSTEMS  --------------------------------------------------------------------------------  Gen: No fevers/chills  Skin: No rashes  Head/Eyes/Ears: Normal hearing,   Respiratory: + dyspnea, SOB, orthopnea, Denies CP, chest tightness  CV: No chest pain  GI: No abdominal pain, diarrhea  : No dysuria, hematuria  MSK: + b/l LE edema  Heme: No easy bruising or bleeding  Psych: No significant depression    All other systems were reviewed and are negative, except as noted.    VITALS/PHYSICAL EXAM  --------------------------------------------------------------------------------  T(C): 36.8 (09-21-23 @ 06:45), Max: 37 (09-21-23 @ 00:50)  HR: 66 (09-21-23 @ 06:45) (61 - 82)  BP: 150/81 (09-21-23 @ 06:45) (122/83 - 158/80)  RR: 20 (09-21-23 @ 06:45) (16 - 20)  SpO2: 96% (09-21-23 @ 06:45) (95% - 100%)  Wt(kg): --  Height (cm): 157.5 (09-20-23 @ 15:40)      Physical Exam:  Gen: NAD,   HEENT: breathing via NC  Pulm: B/L crackles at the bases, on 2 LNC  CV: S1S2  Abd: Soft, +BS   Ext: +2 LE edema B/L up to the knee  Neuro: Awake  Skin: Warm and dry      LABS/STUDIES  --------------------------------------------------------------------------------              8.9    9.19  >-----------<  307      [09-20-23 @ 17:46]              26.7     122  |  87  |  110  ----------------------------<  250      [09-20-23 @ 19:52]  4.9   |  15  |  3.92        Ca     9.2     [09-20-23 @ 19:52]      Mg     1.80     [09-20-23 @ 19:52]      Phos  7.3     [09-20-23 @ 19:52]    TPro  8.2  /  Alb  4.0  /  TBili  0.4  /  DBili  x   /  AST  29  /  ALT  23  /  AlkPhos  1093  [09-20-23 @ 19:52]          Creatinine Trend:  SCr 3.92 [09-20 @ 19:52]  SCr 3.94 [09-20 @ 17:46]  SCr 3.31 [08-30 @ 09:18]  SCr 3.28 [08-29 @ 23:39]  SCr 3.32 [08-29 @ 05:56]    Urinalysis - [09-20-23 @ 19:52]      Color  / Appearance  / SG  / pH       Gluc 250 / Ketone   / Bili  / Urobili        Blood  / Protein  / Leuk Est  / Nitrite       RBC  / WBC  / Hyaline  / Gran  / Sq Epi  / Non Sq Epi  / Bacteria       Iron 86, TIBC 269, %sat 32      [08-17-23 @ 04:54]  Ferritin 984      [08-17-23 @ 04:54]  TSH 4.82      [08-12-23 @ 00:43]  Lipid: chol 107, TG 28, HDL 61, LDL --      [11-25-22 @ 21:00]    HBsAg Nonreact      [02-26-23 @ 05:32]  HCV 0.11, Nonreact      [02-26-23 @ 05:32]  HIV Nonreact      [01-20-23 @ 19:51]    STEVO: titer 1:160, pattern Speckled      [08-21-23 @ 03:30]  dsDNA <12      [01-19-23 @ 05:02]  C3 Complement 128      [01-19-23 @ 05:02]  C4 Complement 46      [01-19-23 @ 05:02]  Rheumatoid Factor 10      [01-19-23 @ 05:02]  ANCA: cANCA Negative, pANCA Negative, atypical ANCA Indeterminate Method interference due to STEVO Fluorescence      [01-19-23 @ 05:02]  anti-GBM <0.2      [01-19-23 @ 05:02]  Free Light Chains: kappa 10.90, lambda 6.34, ratio = 1.72      [08-24 @ 06:20]  Immunofixation Serum:   No Monoclonal Band Identified. HUMPHREY Mccrary MD    Reference Range: None Detected      [01-19-23 @ 05:02]  SPEP Interpretation: Increased Beta fraction, possibly transferrin increase. HUMPHREY Mccrary MD      [01-19-23 @ 05:02]  Immunofixation Urine:   Reference Range: None Detected      [01-19-23 @ 08:10]   [facility] DIVISION OF KIDNEY DISEASES AND HYPERTENSION -- [phone]  -- INITIAL CONSULT NOTE  --------------------------------------------------------------------------------  HPI: Pt is a 59 yo F w/ PMH of HTN, HLD, DM2, CKD (stage 3-4), hypothyroidism (c/b myxedema coma in past), severe pulmonary hypertension, mod-severe TR, asthma, HFpEF, presents for progressive dyspnea at rest with left sided chest tightness that started the day of admission. Nephrology was consulted for ISAMAR on CKD and hyponatremia.     Pt. seen and examined at bedside. Pt. complaining of dyspnea at rest. Pt. gives history of left sided chest tightness that worsened when laying flat and in the decubitus position. She reports SOB on exertion as well. She reports over last few days she has had non-productive cough, decrease PO intake. She states that she is compliant with her Bumex therapy. Pt. was initially placed on BiPAP in the ED. CXR showed pulm edema with small bilateral pleural effusions. She denies any abdominal pain, dysuria, urinary frequency, fever or chills. Pt was found to have hyponatremia with Na of 123 (133 on Aug 30th). Also pt was found to have elevated Scr of 3.93 (3.3 on Aug 30th).       PAST HISTORY  --------------------------------------------------------------------------------  PAST MEDICAL & SURGICAL HISTORY:  HTN (hypertension)  HLD (hyperlipidemia)  DM (diabetes mellitus)  Hypothyroid  H/O pulmonary hypertension  CKD (chronic kidney disease)  (HFpEF) heart failure with preserved ejection fraction  S/P cataract surgery    FAMILY HISTORY:  FH: stroke (Father)      PAST SOCIAL HISTORY:  Denies smoking, alcohol, illicit drug use. Lives at home with her family. She is a retired HHA.   ALLERGIES & MEDICATIONS  --------------------------------------------------------------------------------  Allergies    No Known Allergies    Intolerances    Standing Inpatient Medications  atorvastatin 40 milliGRAM(s) Oral at bedtime  buMETAnide Injectable 2 milliGRAM(s) IV Push two times a day  dextrose 5%. 1000 milliLiter(s) IV Continuous <Continuous>  dextrose 5%. 1000 milliLiter(s) IV Continuous <Continuous>  dextrose 50% Injectable 25 Gram(s) IV Push once  dextrose 50% Injectable 25 Gram(s) IV Push once  dextrose 50% Injectable 12.5 Gram(s) IV Push once  glucagon  Injectable 1 milliGRAM(s) IntraMuscular once  heparin   Injectable 5000 Unit(s) SubCutaneous every 8 hours  hydrALAZINE 100 milliGRAM(s) Oral three times a day  insulin lispro (ADMELOG) corrective regimen sliding scale   SubCutaneous three times a day before meals  insulin lispro (ADMELOG) corrective regimen sliding scale   SubCutaneous at bedtime  isosorbide   dinitrate Tablet (ISORDIL) 10 milliGRAM(s) Oral three times a day  levothyroxine 125 MICROGram(s) Oral daily  NIFEdipine XL 60 milliGRAM(s) Oral daily  pantoprazole    Tablet 40 milliGRAM(s) Oral before breakfast    PRN Inpatient Medications  dextrose Oral Gel 15 Gram(s) Oral once PRN  melatonin 3 milliGRAM(s) Oral at bedtime PRN    REVIEW OF SYSTEMS  --------------------------------------------------------------------------------  Gen: No fevers/chills, =gen weakness  Skin: No rashes  Head/Eyes/Ears: No HA   Respiratory: + dyspnea, orthopnea  CV: See HPI  GI: No abdominal pain, diarrhea  : No dysuria, hematuria  MSK: + b/l LE edema  Heme: No easy bruising or bleeding  Psych: No significant depression    All other systems were reviewed and are negative, except as noted.    VITALS/PHYSICAL EXAM  --------------------------------------------------------------------------------  T(C): 36.8 (09-21-23 @ 06:45), Max: 37 (09-21-23 @ 00:50)  HR: 66 (09-21-23 @ 06:45) (61 - 82)  BP: 150/81 (09-21-23 @ 06:45) (122/83 - 158/80)  RR: 20 (09-21-23 @ 06:45) (16 - 20)  SpO2: 96% (09-21-23 @ 06:45) (95% - 100%)  Wt(kg): --  Height (cm): 157.5 (09-20-23 @ 15:40)    Physical Exam:  Gen: resting, NAD   HEENT: breathing via NC  Pulm: B/L crackles at the bases, on 2 LNC  CV: S1S2+  Abd: Soft, +BS   Ext: LE epitting kali B/L up to the knee  Neuro: Awake  Skin: Warm and dry    LABS/STUDIES  --------------------------------------------------------------------------------              8.9    9.19  >-----------<  307      [09-20-23 @ 17:46]              26.7     122  |  87  |  110  ----------------------------<  250      [09-20-23 @ 19:52]  4.9   |  15  |  3.92        Ca     9.2     [09-20-23 @ 19:52]      Mg     1.80     [09-20-23 @ 19:52]      Phos  7.3     [09-20-23 @ 19:52]    TPro  8.2  /  Alb  4.0  /  TBili  0.4  /  DBili  x   /  AST  29  /  ALT  23  /  AlkPhos  1093  [09-20-23 @ 19:52]    09-21    121<L>  |  88<L>  |  111<H>  ----------------------------<  223<H>  4.3   |  15<L>  |  3.76<H>    Ca    9.1      21 Sep 2023 11:27  Phos  7.3     09-21  Mg     1.90     09-21    TPro  7.7  /  Alb  4.0  /  TBili  0.4  /  DBili  x   /  AST  29  /  ALT  25  /  AlkPhos  965<H>  09-21    Creatinine Trend:  SCr 3.92 [09-20 @ 19:52]  SCr 3.94 [09-20 @ 17:46]  SCr 3.31 [08-30 @ 09:18]  SCr 3.28 [08-29 @ 23:39]  SCr 3.32 [08-29 @ 05:56]

## 2023-09-21 NOTE — PROGRESS NOTE ADULT - PROBLEM SELECTOR PLAN 3
CKD4, baseline cr approximately 3, now with ISAMAR on CKD  - continue with diuresis as above   - Monitor renal function, avoid nephrotoxins  - renal consulted, f/u recs

## 2023-09-21 NOTE — H&P ADULT - PROBLEM SELECTOR PLAN 3
- Pt with CKD 3-4, baseline cr approximately 2, now with ISAMAR on CKD  - Monitor renal function/UOP, avoid nephrotoxins  - renal consult in the AM  -previously on sodium bicarbonate tablets-will need to confirm with med-pharmacy, emailed.   -FEUrea ordered, UA ordered

## 2023-09-21 NOTE — H&P ADULT - PROBLEM SELECTOR PLAN 2
Acute hypoxic respiratory failure – improved with BIPAP, will transition to HFNC given ABG showing PO2 of 48 and PCO2 unchanged.   - TTE 8/6/23 with LVEF 69%, mod-sev TR, severe pulmonary HTN, BNP 6228  - at home on bumex 2 mg IV BID, will need to do IV bumex BID 1mg (order).   - s/p RHC 8/28, elevated PCWP improved with diuresis   -BNP 2092 from 6000s on last admission   -TTE ordered in AM   -HF consult in AM  -BID lyte repletion

## 2023-09-21 NOTE — PROGRESS NOTE ADULT - ASSESSMENT
59 yo F w/ HTN, HLD, DM2, CKD (stage 3-4), hypothyroidism (c/b myxedema coma in past), severe pulmonary hypertension, mod-severe TR, asthma, HFpEF, presents for progressive dyspnea at rest with left sided chest tightness that started this morning.

## 2023-09-21 NOTE — PATIENT PROFILE ADULT - FALL HARM RISK - HARM RISK INTERVENTIONS
Assistance with ambulation/Assistance OOB with selected safe patient handling equipment/Communicate Risk of Fall with Harm to all staff/Discuss with provider need for PT consult/Monitor gait and stability/Provide patient with walking aids - walker, cane, crutches/Reinforce activity limits and safety measures with patient and family/Sit up slowly, dangle for a short time, stand at bedside before walking/Tailored Fall Risk Interventions/Visual Cue: Yellow wristband and red socks/Bed in lowest position, wheels locked, appropriate side rails in place/Call bell, personal items and telephone in reach/Instruct patient to call for assistance before getting out of bed or chair/Non-slip footwear when patient is out of bed/Pfeifer to call system/Physically safe environment - no spills, clutter or unnecessary equipment/Purposeful Proactive Rounding/Room/bathroom lighting operational, light cord in reach

## 2023-09-21 NOTE — H&P ADULT - PROBLEM SELECTOR PLAN 6
·likely due to HF  -previously has needed hypertonic saline   -Na 122-> from Na 133 in August 2023  -will not change more than 6-8 mEq in 24 hours.   -renal consult in AM   -will fluid restrict for now  Improved in the past with hypertonic saline

## 2023-09-21 NOTE — PROGRESS NOTE ADULT - PROBLEM SELECTOR PLAN 4
Right heart cath on 1/23/2023 demonstrates PVR normal, elevated PCWP, mPAP 41, s/o group 2 PHTN  - s/p RHC 8/28  - diuresis as above

## 2023-09-22 DIAGNOSIS — E87.20 ACIDOSIS, UNSPECIFIED: ICD-10-CM

## 2023-09-22 DIAGNOSIS — I50.33 ACUTE ON CHRONIC DIASTOLIC (CONGESTIVE) HEART FAILURE: ICD-10-CM

## 2023-09-22 LAB
ALBUMIN SERPL ELPH-MCNC: 3.7 G/DL — SIGNIFICANT CHANGE UP (ref 3.3–5)
ALBUMIN SERPL ELPH-MCNC: 3.9 G/DL — SIGNIFICANT CHANGE UP (ref 3.3–5)
ALP SERPL-CCNC: 838 U/L — HIGH (ref 40–120)
ALP SERPL-CCNC: 936 U/L — HIGH (ref 40–120)
ALT FLD-CCNC: 18 U/L — SIGNIFICANT CHANGE UP (ref 4–33)
ALT FLD-CCNC: 26 U/L — SIGNIFICANT CHANGE UP (ref 4–33)
ANION GAP SERPL CALC-SCNC: 20 MMOL/L — HIGH (ref 7–14)
ANION GAP SERPL CALC-SCNC: 20 MMOL/L — HIGH (ref 7–14)
ANION GAP SERPL CALC-SCNC: 22 MMOL/L — HIGH (ref 7–14)
ANION GAP SERPL CALC-SCNC: 23 MMOL/L — HIGH (ref 7–14)
APTT BLD: 42.5 SEC — HIGH (ref 24.5–35.6)
APTT BLD: 50.4 SEC — HIGH (ref 24.5–35.6)
APTT BLD: 52.2 SEC — HIGH (ref 24.5–35.6)
AST SERPL-CCNC: 28 U/L — SIGNIFICANT CHANGE UP (ref 4–32)
AST SERPL-CCNC: 29 U/L — SIGNIFICANT CHANGE UP (ref 4–32)
BASE EXCESS BLDV CALC-SCNC: -13 MMOL/L — LOW (ref -2–3)
BASE EXCESS BLDV CALC-SCNC: -13.3 MMOL/L — LOW (ref -2–3)
BASOPHILS # BLD AUTO: 0.01 K/UL — SIGNIFICANT CHANGE UP (ref 0–0.2)
BASOPHILS # BLD AUTO: 0.01 K/UL — SIGNIFICANT CHANGE UP (ref 0–0.2)
BASOPHILS NFR BLD AUTO: 0.1 % — SIGNIFICANT CHANGE UP (ref 0–2)
BASOPHILS NFR BLD AUTO: 0.2 % — SIGNIFICANT CHANGE UP (ref 0–2)
BILIRUB SERPL-MCNC: 0.4 MG/DL — SIGNIFICANT CHANGE UP (ref 0.2–1.2)
BILIRUB SERPL-MCNC: 0.4 MG/DL — SIGNIFICANT CHANGE UP (ref 0.2–1.2)
BLOOD GAS VENOUS COMPREHENSIVE RESULT: SIGNIFICANT CHANGE UP
BUN SERPL-MCNC: 111 MG/DL — HIGH (ref 7–23)
BUN SERPL-MCNC: 111 MG/DL — HIGH (ref 7–23)
BUN SERPL-MCNC: 113 MG/DL — HIGH (ref 7–23)
BUN SERPL-MCNC: 81 MG/DL — HIGH (ref 7–23)
CA-I BLD-SCNC: 1.14 MMOL/L — LOW (ref 1.15–1.29)
CALCIUM SERPL-MCNC: 8.9 MG/DL — SIGNIFICANT CHANGE UP (ref 8.4–10.5)
CALCIUM SERPL-MCNC: 9 MG/DL — SIGNIFICANT CHANGE UP (ref 8.4–10.5)
CHLORIDE BLDV-SCNC: 91 MMOL/L — LOW (ref 96–108)
CHLORIDE BLDV-SCNC: 91 MMOL/L — LOW (ref 96–108)
CHLORIDE SERPL-SCNC: 85 MMOL/L — LOW (ref 98–107)
CHLORIDE SERPL-SCNC: 85 MMOL/L — LOW (ref 98–107)
CHLORIDE SERPL-SCNC: 87 MMOL/L — LOW (ref 98–107)
CHLORIDE SERPL-SCNC: 92 MMOL/L — LOW (ref 98–107)
CK MB BLD-MCNC: 23.8 % — HIGH (ref 0–2.5)
CK MB CFR SERPL CALC: 18.3 NG/ML — HIGH
CK SERPL-CCNC: 77 U/L — SIGNIFICANT CHANGE UP (ref 25–170)
CO2 BLDV-SCNC: 14.9 MMOL/L — LOW (ref 22–26)
CO2 BLDV-SCNC: 15.8 MMOL/L — LOW (ref 22–26)
CO2 SERPL-SCNC: 13 MMOL/L — LOW (ref 22–31)
CO2 SERPL-SCNC: 13 MMOL/L — LOW (ref 22–31)
CO2 SERPL-SCNC: 14 MMOL/L — LOW (ref 22–31)
CO2 SERPL-SCNC: 18 MMOL/L — LOW (ref 22–31)
CREAT SERPL-MCNC: 3.18 MG/DL — HIGH (ref 0.5–1.3)
CREAT SERPL-MCNC: 3.71 MG/DL — HIGH (ref 0.5–1.3)
CREAT SERPL-MCNC: 3.75 MG/DL — HIGH (ref 0.5–1.3)
CREAT SERPL-MCNC: 3.89 MG/DL — HIGH (ref 0.5–1.3)
DIALYSIS INSTRUMENT RESULT - HEPATITIS B SURFACE ANTIGEN: NEGATIVE — SIGNIFICANT CHANGE UP
EGFR: 13 ML/MIN/1.73M2 — LOW
EGFR: 13 ML/MIN/1.73M2 — LOW
EGFR: 14 ML/MIN/1.73M2 — LOW
EGFR: 16 ML/MIN/1.73M2 — LOW
EOSINOPHIL # BLD AUTO: 0.05 K/UL — SIGNIFICANT CHANGE UP (ref 0–0.5)
EOSINOPHIL # BLD AUTO: 0.07 K/UL — SIGNIFICANT CHANGE UP (ref 0–0.5)
EOSINOPHIL NFR BLD AUTO: 0.7 % — SIGNIFICANT CHANGE UP (ref 0–6)
EOSINOPHIL NFR BLD AUTO: 1.1 % — SIGNIFICANT CHANGE UP (ref 0–6)
GAS PNL BLDV: 121 MMOL/L — LOW (ref 136–145)
GAS PNL BLDV: 122 MMOL/L — LOW (ref 136–145)
GLUCOSE BLDC GLUCOMTR-MCNC: 192 MG/DL — HIGH (ref 70–99)
GLUCOSE BLDC GLUCOMTR-MCNC: 196 MG/DL — HIGH (ref 70–99)
GLUCOSE BLDC GLUCOMTR-MCNC: 224 MG/DL — HIGH (ref 70–99)
GLUCOSE BLDV-MCNC: 180 MG/DL — HIGH (ref 70–99)
GLUCOSE BLDV-MCNC: 236 MG/DL — HIGH (ref 70–99)
GLUCOSE SERPL-MCNC: 121 MG/DL — HIGH (ref 70–99)
GLUCOSE SERPL-MCNC: 173 MG/DL — HIGH (ref 70–99)
GLUCOSE SERPL-MCNC: 199 MG/DL — HIGH (ref 70–99)
GLUCOSE SERPL-MCNC: 200 MG/DL — HIGH (ref 70–99)
HCO3 BLDV-SCNC: 14 MMOL/L — LOW (ref 22–29)
HCO3 BLDV-SCNC: 15 MMOL/L — LOW (ref 22–29)
HCT VFR BLD CALC: 20.9 % — CRITICAL LOW (ref 34.5–45)
HCT VFR BLD CALC: 23.5 % — LOW (ref 34.5–45)
HCT VFR BLD CALC: 24 % — LOW (ref 34.5–45)
HCT VFR BLDA CALC: 24 % — LOW (ref 34.5–46.5)
HCT VFR BLDA CALC: 25 % — LOW (ref 34.5–46.5)
HGB BLD CALC-MCNC: 8 G/DL — LOW (ref 11.7–16.1)
HGB BLD CALC-MCNC: 8.4 G/DL — LOW (ref 11.7–16.1)
HGB BLD-MCNC: 7 G/DL — CRITICAL LOW (ref 11.5–15.5)
HGB BLD-MCNC: 7.9 G/DL — LOW (ref 11.5–15.5)
HGB BLD-MCNC: 8.1 G/DL — LOW (ref 11.5–15.5)
IANC: 5.68 K/UL — SIGNIFICANT CHANGE UP (ref 1.8–7.4)
IANC: 6.19 K/UL — SIGNIFICANT CHANGE UP (ref 1.8–7.4)
IMM GRANULOCYTES NFR BLD AUTO: 1.2 % — HIGH (ref 0–0.9)
IMM GRANULOCYTES NFR BLD AUTO: 1.9 % — HIGH (ref 0–0.9)
INR BLD: 1.27 RATIO — HIGH (ref 0.85–1.18)
INR BLD: 1.34 RATIO — HIGH (ref 0.85–1.18)
INR BLD: 1.39 RATIO — HIGH (ref 0.85–1.18)
LACTATE BLDV-MCNC: 0.8 MMOL/L — SIGNIFICANT CHANGE UP (ref 0.5–2)
LACTATE BLDV-MCNC: 1.4 MMOL/L — SIGNIFICANT CHANGE UP (ref 0.5–2)
LYMPHOCYTES # BLD AUTO: 0.27 K/UL — LOW (ref 1–3.3)
LYMPHOCYTES # BLD AUTO: 0.41 K/UL — LOW (ref 1–3.3)
LYMPHOCYTES # BLD AUTO: 4.2 % — LOW (ref 13–44)
LYMPHOCYTES # BLD AUTO: 5.6 % — LOW (ref 13–44)
MAGNESIUM SERPL-MCNC: 1.8 MG/DL — SIGNIFICANT CHANGE UP (ref 1.6–2.6)
MAGNESIUM SERPL-MCNC: 1.8 MG/DL — SIGNIFICANT CHANGE UP (ref 1.6–2.6)
MAGNESIUM SERPL-MCNC: 1.9 MG/DL — SIGNIFICANT CHANGE UP (ref 1.6–2.6)
MAGNESIUM SERPL-MCNC: 1.9 MG/DL — SIGNIFICANT CHANGE UP (ref 1.6–2.6)
MCHC RBC-ENTMCNC: 25.1 PG — LOW (ref 27–34)
MCHC RBC-ENTMCNC: 25.2 PG — LOW (ref 27–34)
MCHC RBC-ENTMCNC: 25.2 PG — LOW (ref 27–34)
MCHC RBC-ENTMCNC: 33.5 GM/DL — SIGNIFICANT CHANGE UP (ref 32–36)
MCHC RBC-ENTMCNC: 33.6 GM/DL — SIGNIFICANT CHANGE UP (ref 32–36)
MCHC RBC-ENTMCNC: 33.8 GM/DL — SIGNIFICANT CHANGE UP (ref 32–36)
MCV RBC AUTO: 74.5 FL — LOW (ref 80–100)
MCV RBC AUTO: 74.8 FL — LOW (ref 80–100)
MCV RBC AUTO: 74.9 FL — LOW (ref 80–100)
MONOCYTES # BLD AUTO: 0.37 K/UL — SIGNIFICANT CHANGE UP (ref 0–0.9)
MONOCYTES # BLD AUTO: 0.48 K/UL — SIGNIFICANT CHANGE UP (ref 0–0.9)
MONOCYTES NFR BLD AUTO: 5.7 % — SIGNIFICANT CHANGE UP (ref 2–14)
MONOCYTES NFR BLD AUTO: 6.6 % — SIGNIFICANT CHANGE UP (ref 2–14)
NEUTROPHILS # BLD AUTO: 5.68 K/UL — SIGNIFICANT CHANGE UP (ref 1.8–7.4)
NEUTROPHILS # BLD AUTO: 6.19 K/UL — SIGNIFICANT CHANGE UP (ref 1.8–7.4)
NEUTROPHILS NFR BLD AUTO: 85.1 % — HIGH (ref 43–77)
NEUTROPHILS NFR BLD AUTO: 87.6 % — HIGH (ref 43–77)
NRBC # BLD: 0 /100 WBCS — SIGNIFICANT CHANGE UP (ref 0–0)
NRBC # FLD: 0 K/UL — SIGNIFICANT CHANGE UP (ref 0–0)
NRBC # FLD: 0 K/UL — SIGNIFICANT CHANGE UP (ref 0–0)
NRBC # FLD: 0.03 K/UL — HIGH (ref 0–0)
PCO2 BLDV: 36 MMHG — LOW (ref 39–52)
PCO2 BLDV: 40 MMHG — SIGNIFICANT CHANGE UP (ref 39–52)
PH BLDV: 7.17 — LOW (ref 7.32–7.43)
PH BLDV: 7.19 — LOW (ref 7.32–7.43)
PHOSPHATE SERPL-MCNC: 6.7 MG/DL — HIGH (ref 2.5–4.5)
PHOSPHATE SERPL-MCNC: 7.4 MG/DL — HIGH (ref 2.5–4.5)
PHOSPHATE SERPL-MCNC: 7.5 MG/DL — HIGH (ref 2.5–4.5)
PLATELET # BLD AUTO: 206 K/UL — SIGNIFICANT CHANGE UP (ref 150–400)
PLATELET # BLD AUTO: 233 K/UL — SIGNIFICANT CHANGE UP (ref 150–400)
PLATELET # BLD AUTO: 240 K/UL — SIGNIFICANT CHANGE UP (ref 150–400)
PO2 BLDV: 70 MMHG — HIGH (ref 25–45)
PO2 BLDV: 77 MMHG — HIGH (ref 25–45)
POTASSIUM BLDV-SCNC: 4.8 MMOL/L — SIGNIFICANT CHANGE UP (ref 3.5–5.1)
POTASSIUM BLDV-SCNC: 4.9 MMOL/L — SIGNIFICANT CHANGE UP (ref 3.5–5.1)
POTASSIUM SERPL-MCNC: 3.9 MMOL/L — SIGNIFICANT CHANGE UP (ref 3.5–5.3)
POTASSIUM SERPL-MCNC: 4.6 MMOL/L — SIGNIFICANT CHANGE UP (ref 3.5–5.3)
POTASSIUM SERPL-MCNC: 4.7 MMOL/L — SIGNIFICANT CHANGE UP (ref 3.5–5.3)
POTASSIUM SERPL-MCNC: 4.7 MMOL/L — SIGNIFICANT CHANGE UP (ref 3.5–5.3)
POTASSIUM SERPL-SCNC: 3.9 MMOL/L — SIGNIFICANT CHANGE UP (ref 3.5–5.3)
POTASSIUM SERPL-SCNC: 4.6 MMOL/L — SIGNIFICANT CHANGE UP (ref 3.5–5.3)
POTASSIUM SERPL-SCNC: 4.7 MMOL/L — SIGNIFICANT CHANGE UP (ref 3.5–5.3)
POTASSIUM SERPL-SCNC: 4.7 MMOL/L — SIGNIFICANT CHANGE UP (ref 3.5–5.3)
PROT SERPL-MCNC: 7.3 G/DL — SIGNIFICANT CHANGE UP (ref 6–8.3)
PROT SERPL-MCNC: 7.9 G/DL — SIGNIFICANT CHANGE UP (ref 6–8.3)
PROTHROM AB SERPL-ACNC: 14.3 SEC — HIGH (ref 9.5–13)
PROTHROM AB SERPL-ACNC: 14.9 SEC — HIGH (ref 9.5–13)
PROTHROM AB SERPL-ACNC: 15.4 SEC — HIGH (ref 9.5–13)
RBC # BLD: 2.79 M/UL — LOW (ref 3.8–5.2)
RBC # BLD: 3.14 M/UL — LOW (ref 3.8–5.2)
RBC # BLD: 3.22 M/UL — LOW (ref 3.8–5.2)
RBC # FLD: 14.3 % — SIGNIFICANT CHANGE UP (ref 10.3–14.5)
RBC # FLD: 14.6 % — HIGH (ref 10.3–14.5)
RBC # FLD: 14.6 % — HIGH (ref 10.3–14.5)
SAO2 % BLDV: 94.8 % — HIGH (ref 67–88)
SAO2 % BLDV: 94.8 % — HIGH (ref 67–88)
SODIUM SERPL-SCNC: 120 MMOL/L — CRITICAL LOW (ref 135–145)
SODIUM SERPL-SCNC: 121 MMOL/L — LOW (ref 135–145)
SODIUM SERPL-SCNC: 121 MMOL/L — LOW (ref 135–145)
SODIUM SERPL-SCNC: 130 MMOL/L — LOW (ref 135–145)
TROPONIN T, HIGH SENSITIVITY RESULT: 56 NG/L — CRITICAL HIGH
TSH SERPL-MCNC: 4.58 UIU/ML — HIGH (ref 0.27–4.2)
WBC # BLD: 6.48 K/UL — SIGNIFICANT CHANGE UP (ref 3.8–10.5)
WBC # BLD: 7.28 K/UL — SIGNIFICANT CHANGE UP (ref 3.8–10.5)
WBC # BLD: 7.34 K/UL — SIGNIFICANT CHANGE UP (ref 3.8–10.5)
WBC # FLD AUTO: 6.48 K/UL — SIGNIFICANT CHANGE UP (ref 3.8–10.5)
WBC # FLD AUTO: 7.28 K/UL — SIGNIFICANT CHANGE UP (ref 3.8–10.5)
WBC # FLD AUTO: 7.34 K/UL — SIGNIFICANT CHANGE UP (ref 3.8–10.5)

## 2023-09-22 PROCEDURE — 93970 EXTREMITY STUDY: CPT | Mod: 26

## 2023-09-22 PROCEDURE — 71045 X-RAY EXAM CHEST 1 VIEW: CPT | Mod: 26

## 2023-09-22 PROCEDURE — 99233 SBSQ HOSP IP/OBS HIGH 50: CPT

## 2023-09-22 PROCEDURE — 99233 SBSQ HOSP IP/OBS HIGH 50: CPT | Mod: GC

## 2023-09-22 PROCEDURE — 99291 CRITICAL CARE FIRST HOUR: CPT | Mod: GC

## 2023-09-22 RX ORDER — INSULIN LISPRO 100/ML
VIAL (ML) SUBCUTANEOUS EVERY 6 HOURS
Refills: 0 | Status: DISCONTINUED | OUTPATIENT
Start: 2023-09-22 | End: 2023-09-23

## 2023-09-22 RX ORDER — BUMETANIDE 0.25 MG/ML
2 INJECTION INTRAMUSCULAR; INTRAVENOUS
Qty: 20 | Refills: 0 | Status: DISCONTINUED | OUTPATIENT
Start: 2023-09-22 | End: 2023-09-22

## 2023-09-22 RX ORDER — CHLORHEXIDINE GLUCONATE 213 G/1000ML
1 SOLUTION TOPICAL
Refills: 0 | Status: DISCONTINUED | OUTPATIENT
Start: 2023-09-22 | End: 2023-09-29

## 2023-09-22 RX ORDER — SODIUM BICARBONATE 1 MEQ/ML
650 SYRINGE (ML) INTRAVENOUS THREE TIMES A DAY
Refills: 0 | Status: DISCONTINUED | OUTPATIENT
Start: 2023-09-22 | End: 2023-09-28

## 2023-09-22 RX ORDER — ACETAMINOPHEN 500 MG
1000 TABLET ORAL ONCE
Refills: 0 | Status: COMPLETED | OUTPATIENT
Start: 2023-09-22 | End: 2023-09-22

## 2023-09-22 RX ADMIN — HEPARIN SODIUM 5000 UNIT(S): 5000 INJECTION INTRAVENOUS; SUBCUTANEOUS at 05:00

## 2023-09-22 RX ADMIN — ISOSORBIDE DINITRATE 10 MILLIGRAM(S): 5 TABLET ORAL at 05:00

## 2023-09-22 RX ADMIN — Medication 100 MILLIGRAM(S): at 05:01

## 2023-09-22 RX ADMIN — Medication 125 MICROGRAM(S): at 05:00

## 2023-09-22 RX ADMIN — HEPARIN SODIUM 5000 UNIT(S): 5000 INJECTION INTRAVENOUS; SUBCUTANEOUS at 13:08

## 2023-09-22 RX ADMIN — Medication 650 MILLIGRAM(S): at 21:05

## 2023-09-22 RX ADMIN — Medication 1: at 18:29

## 2023-09-22 RX ADMIN — BUMETANIDE 10 MG/HR: 0.25 INJECTION INTRAMUSCULAR; INTRAVENOUS at 06:29

## 2023-09-22 RX ADMIN — Medication 1: at 09:15

## 2023-09-22 RX ADMIN — ISOSORBIDE DINITRATE 10 MILLIGRAM(S): 5 TABLET ORAL at 13:05

## 2023-09-22 RX ADMIN — Medication 400 MILLIGRAM(S): at 00:19

## 2023-09-22 RX ADMIN — Medication 1000 MILLIGRAM(S): at 01:19

## 2023-09-22 RX ADMIN — Medication 2: at 13:07

## 2023-09-22 RX ADMIN — PANTOPRAZOLE SODIUM 40 MILLIGRAM(S): 20 TABLET, DELAYED RELEASE ORAL at 05:01

## 2023-09-22 RX ADMIN — Medication 100 MILLIGRAM(S): at 13:05

## 2023-09-22 RX ADMIN — Medication 60 MILLIGRAM(S): at 05:01

## 2023-09-22 RX ADMIN — Medication 650 MILLIGRAM(S): at 03:00

## 2023-09-22 RX ADMIN — Medication 650 MILLIGRAM(S): at 13:05

## 2023-09-22 RX ADMIN — ISOSORBIDE DINITRATE 10 MILLIGRAM(S): 5 TABLET ORAL at 21:05

## 2023-09-22 RX ADMIN — BUMETANIDE 10 MG/HR: 0.25 INJECTION INTRAMUSCULAR; INTRAVENOUS at 03:00

## 2023-09-22 RX ADMIN — Medication 100 MILLIGRAM(S): at 21:05

## 2023-09-22 RX ADMIN — HEPARIN SODIUM 5000 UNIT(S): 5000 INJECTION INTRAVENOUS; SUBCUTANEOUS at 21:05

## 2023-09-22 RX ADMIN — ATORVASTATIN CALCIUM 40 MILLIGRAM(S): 80 TABLET, FILM COATED ORAL at 21:05

## 2023-09-22 NOTE — CHART NOTE - NSCHARTNOTEFT_GEN_A_CORE
MICU ACCEPT NOTE    CHIEF COMPLAINT: Patient is a 58y old  Female who presents with a chief complaint of progressive dyspnea (22 Sep 2023 11:40)      HPI:  Ms. Hernandez is a 59 yo F w/ HTN, HLD, DM2, CKD (stage 3-4), hypothyroidism (c/b myxedema coma in past), severe pulmonary hypertension, mod-severe TR, asthma, HFpEF, presents for progressive dyspnea at rest with left sided chest tightness. She reports SOB on exertion as well. She reports over last few days she has had non-productive cough, decrease PO intake, nausea this morning, and diarrhea (without blood) this morning. She was placed on BIPAP in the ED. CXR was sig for small bilateral pleural effusions. Labs were sig for the following: BUN/Cr 110/3.92, Na 122 (last 133 in August), CO2 15, AG 20, -290, d-dimer 3509, WBC 9.19, Hb 8.9/26.7, (8.2/1245), MCV 75.4, RDW 14.6, neutrophil count 87.1%, alk phos 1093, pro-BNP 2092, LA 0.9, pCO2 36.     Most recent TTE (8/8) showing HFpEF with elevated RVSP and RV dilation.   Most recent RHC (8/28): RA 17 mmHg. PA 51/22/36 mmHg. PCW 25 mmHg. CO 6.69 L/min with CI 4.25 L/min/m2 (Hgb 6.8).       PAST MEDICAL & SURGICAL HISTORY:  HTN (hypertension)      HLD (hyperlipidemia)      DM (diabetes mellitus)      Hypothyroid      H/O pulmonary hypertension      CKD (chronic kidney disease)      (HFpEF) heart failure with preserved ejection fraction      S/P cataract surgery          FAMILY HISTORY:  FH: stroke (Father)        SOCIAL HISTORY:  Smoking:   Substance Use:   EtOH Use:   Advance Directives:     MEDICATIONS  (STANDING):  atorvastatin 40 milliGRAM(s) Oral at bedtime  buMETAnide Infusion 2 mG/Hr (10 mL/Hr) IV Continuous <Continuous>  dextrose 5%. 1000 milliLiter(s) (100 mL/Hr) IV Continuous <Continuous>  dextrose 5%. 1000 milliLiter(s) (50 mL/Hr) IV Continuous <Continuous>  dextrose 50% Injectable 25 Gram(s) IV Push once  dextrose 50% Injectable 12.5 Gram(s) IV Push once  dextrose 50% Injectable 25 Gram(s) IV Push once  glucagon  Injectable 1 milliGRAM(s) IntraMuscular once  heparin   Injectable 5000 Unit(s) SubCutaneous every 8 hours  hydrALAZINE 100 milliGRAM(s) Oral three times a day  insulin lispro (ADMELOG) corrective regimen sliding scale   SubCutaneous three times a day before meals  insulin lispro (ADMELOG) corrective regimen sliding scale   SubCutaneous at bedtime  isosorbide   dinitrate Tablet (ISORDIL) 10 milliGRAM(s) Oral three times a day  levothyroxine 125 MICROGram(s) Oral daily  NIFEdipine XL 60 milliGRAM(s) Oral daily  pantoprazole    Tablet 40 milliGRAM(s) Oral before breakfast  sodium bicarbonate 650 milliGRAM(s) Oral three times a day    MEDICATIONS  (PRN):  dextrose Oral Gel 15 Gram(s) Oral once PRN Blood Glucose LESS THAN 70 milliGRAM(s)/deciliter  melatonin 3 milliGRAM(s) Oral at bedtime PRN Insomnia      Allergies    No Known Allergies    Intolerances        REVIEW OF SYSTEMS:  CONSTITUTIONAL: No weakness, fevers or chills  EYES/ENT: No visual changes;  No vertigo or throat pain   NECK: No pain or stiffness  RESPIRATORY: No cough, wheezing, hemoptysis; No shortness of breath  CARDIOVASCULAR: No chest pain or palpitations  GASTROINTESTINAL: No abdominal or epigastric pain. No nausea, vomiting, or hematemesis; No diarrhea or constipation. No melena or hematochezia.  GENITOURINARY: No dysuria, frequency or hematuria  NEUROLOGICAL: No numbness or weakness  SKIN: No itching, rashes  [ ] All other systems negative  [ ] Unable to assess ROS because ________    OBJECTIVE:  ICU Vital Signs Last 24 Hrs  T(C): --  T(F): --  HR: 61 (22 Sep 2023 09:24) (60 - 62)  BP: 133/105 (22 Sep 2023 09:00) (105/62 - 149/75)  BP(mean): --  ABP: --  ABP(mean): --  RR: 18 (22 Sep 2023 09:00) (18 - 18)  SpO2: 98% (22 Sep 2023 09:24) (98% - 100%)    O2 Parameters below as of 22 Sep 2023 09:00  Patient On (Oxygen Delivery Method): nasal cannula  O2 Flow (L/min): 3            09-21 @ 07:01  -  09-22 @ 07:00  --------------------------------------------------------  IN: 0 mL / OUT: 0 mL / NET: 0 mL    09-22 @ 07:01 - 09-22 @ 11:57  --------------------------------------------------------  IN: 50 mL / OUT: 0 mL / NET: 50 mL      CAPILLARY BLOOD GLUCOSE      POCT Blood Glucose.: 196 mg/dL (22 Sep 2023 08:42)      PHYSICAL EXAM:  GENERAL: NAD, lying in bed comfortably  HEAD:  Atraumatic, normocephalic  EYES: EOMI, PERRLA, conjunctiva and sclera clear  ENT: Moist mucous membranes  NECK: Supple, no JVD  HEART: S1, S2, regular rate and rhythm, no murmurs, rubs, or gallops  LUNGS: Unlabored respirations.  Clear to auscultation bilaterally, no crackles, wheezing, or rhonchi  ABDOMEN: Soft, nontender, nondistended, +BS  EXTREMITIES: 2+ peripheral pulses bilaterally. No clubbing, cyanosis, or edema  NERVOUS SYSTEM:  A&Ox3, no focal deficits   SKIN: No rashes or lesions  LINES:     LABS:                        7.9    7.28  )-----------( 233      ( 22 Sep 2023 08:04 )             23.5     Hgb Trend: 7.9<--, 8.1<--, 8.7<--, 8.9<--  09-22    121<L>  |  85<L>  |  113<H>  ----------------------------<  173<H>  4.7   |  14<L>  |  3.89<H>    Ca    9.0      22 Sep 2023 08:04  Phos  7.5     09-22  Mg     1.90     09-22    TPro  7.9  /  Alb  3.9  /  TBili  0.4  /  DBili  x   /  AST  29  /  ALT  26  /  AlkPhos  936<H>  09-22    Creatinine Trend: 3.89<--, 3.71<--, 3.66<--, 3.76<--, 3.92<--, 3.94<--  PT/INR - ( 22 Sep 2023 08:04 )   PT: 14.9 sec;   INR: 1.34 ratio         PTT - ( 22 Sep 2023 08:04 )  PTT:52.2 sec  Urinalysis Basic - ( 22 Sep 2023 08:04 )    Color: x / Appearance: x / SG: x / pH: x  Gluc: 173 mg/dL / Ketone: x  / Bili: x / Urobili: x   Blood: x / Protein: x / Nitrite: x   Leuk Esterase: x / RBC: x / WBC x   Sq Epi: x / Non Sq Epi: x / Bacteria: x        Venous Blood Gas:  09-22 @ 08:04  7.19/36/70/14/94.8  VBG Lactate: 0.8  Venous Blood Gas:  09-22 @ 01:13  7.17/40/77/15/94.8  VBG Lactate: 1.4  Venous Blood Gas:  09-20 @ 21:42  7.20/36/48/14/76.4  VBG Lactate: 0.9  Venous Blood Gas:  09-20 @ 19:52  7.20/38/43/15/67.8  VBG Lactate: 1.1  Venous Blood Gas:  09-20 @ 17:46  7.23/34/34/14/54.4  VBG Lactate: 1.6      MICROBIOLOGY:     RADIOLOGY & ADDITIONAL TESTS:    ASSESSMENT  AIDE HERNANDEZ is a 58y female with PMH *** in the hospital for 2d transferred to the MICU for ***.    PLAN  =====Neurologic=====  Patient is AOx4    =====Pulmonary=====  #Dyspnea   Plan: Acute on chronic right sided CHF vs progressive renal failure vs PE (will need to r/o with PE, but given kidney dysfunction will do dopplers of LE first to r/o DVT given high risk of ESRD with contrast).   -STAT duplex of lower extremity reordered to r/o DVT  -troponin 65->64  -pro-BNP 2092   -RVP negative   Most recent TTE (8/6) showing HFpEF with elevated RVSP and RV dilation.   -TTE ordered   -received 80 mg IV lasix, with minimal response will start on bumex 2 mg IV BID and adjust based on urine output.  - Currently on 3L NC, kai      =====Cardiovascular=====  #Acute on chronic diastolic congestive heart failure  - Bumex gtt 2mg/hr    - Hydralazine 100mg TID (hold SBP<90)  - Isosorbide dinitrate 10mg TID (hold SBP<90)  - Strict I/O  - Daily standing weights  - Monitor lytes replete K>4.0 and Mg>2.0   - Trend SCr  - thyroid function in AM        =====GI=====  #GERD  - Protonix 40mg IV QD    #Diet  - NPO    =====Renal/=====  #Electrolytes  - Maintain K>4, Phos>3, Mag>2, iCal>1    #Hyponatremia  - Pt. with hypervolemia hyponatremia.   - Last SNa low at 121.   - Pt. initiated on IV Bumex infusion for hypervolemia management.   - fluid restriction (<1 L/day).   - Monitor SNa q8 hrs.   - Avoid overcorrection of SNa.    #Acute renal failure superimposed on chronic kidney disease.   - Pt with CKD 3-4, baseline cr approximately 2, now with ISAMAR on CKD  - Monitor renal function/UOP, avoid nephrotoxins  - renal consult in the AM  - previously on sodium bicarbonate tablets-will need to confirm with med-pharmacy, emailed.   - FEUrea ordered, UA ordered.  - urgent HD today per nephro for volume overload and anuria,     #Metabolic acidosis  Pt. with metabolic acidosis in the setting of volume overload and renal failure. pH is low at 7.19, and SCO2 decreased to 14 today.   - Plan to initiate HD, as outlined above.   - Monitor SCO2.    =====Endocrine=====  #DM2  - While NPO, FSBG and ADRIAN q6h    =====Infectious Disease=====  Patient is afebrile and is not currently being treated for any infection.    =====Heme/Onc=====  #DVT Ppx  - heparin 5000 subQ    =====Ethics=====  FULL CODE MICU ACCEPT NOTE    CHIEF COMPLAINT: Patient is a 58y old  Female who presents with a chief complaint of progressive dyspnea (22 Sep 2023 11:40)      HPI:  Ms. Hernandez is a 57 yo F w/ HTN, HLD, DM2, CKD (stage 3-4), hypothyroidism (c/b myxedema coma in past), severe pulmonary hypertension, mod-severe TR, asthma, HFpEF, presents for progressive dyspnea at rest with left sided chest tightness. She reports SOB on exertion as well. She reports over last few days she has had non-productive cough, decrease PO intake, nausea this morning, and diarrhea (without blood) this morning. She was placed on BIPAP in the ED. CXR was sig for small bilateral pleural effusions. Labs were sig for the following: BUN/Cr 110/3.92, Na 122 (last 133 in August), CO2 15, AG 20, -290, d-dimer 3509, WBC 9.19, Hb 8.9/26.7, (8.2/1245), MCV 75.4, RDW 14.6, neutrophil count 87.1%, alk phos 1093, pro-BNP 2092, LA 0.9, pCO2 36.     Most recent TTE (8/8) showing HFpEF with elevated RVSP and RV dilation.   Most recent RHC (8/28): RA 17 mmHg. PA 51/22/36 mmHg. PCW 25 mmHg. CO 6.69 L/min with CI 4.25 L/min/m2 (Hgb 6.8).       PAST MEDICAL & SURGICAL HISTORY:  HTN (hypertension)      HLD (hyperlipidemia)      DM (diabetes mellitus)      Hypothyroid      H/O pulmonary hypertension      CKD (chronic kidney disease)      (HFpEF) heart failure with preserved ejection fraction      S/P cataract surgery          FAMILY HISTORY:  FH: stroke (Father)        SOCIAL HISTORY:  Smoking:   Substance Use:   EtOH Use:   Advance Directives:     MEDICATIONS  (STANDING):  atorvastatin 40 milliGRAM(s) Oral at bedtime  buMETAnide Infusion 2 mG/Hr (10 mL/Hr) IV Continuous <Continuous>  dextrose 5%. 1000 milliLiter(s) (100 mL/Hr) IV Continuous <Continuous>  dextrose 5%. 1000 milliLiter(s) (50 mL/Hr) IV Continuous <Continuous>  dextrose 50% Injectable 25 Gram(s) IV Push once  dextrose 50% Injectable 12.5 Gram(s) IV Push once  dextrose 50% Injectable 25 Gram(s) IV Push once  glucagon  Injectable 1 milliGRAM(s) IntraMuscular once  heparin   Injectable 5000 Unit(s) SubCutaneous every 8 hours  hydrALAZINE 100 milliGRAM(s) Oral three times a day  insulin lispro (ADMELOG) corrective regimen sliding scale   SubCutaneous three times a day before meals  insulin lispro (ADMELOG) corrective regimen sliding scale   SubCutaneous at bedtime  isosorbide   dinitrate Tablet (ISORDIL) 10 milliGRAM(s) Oral three times a day  levothyroxine 125 MICROGram(s) Oral daily  NIFEdipine XL 60 milliGRAM(s) Oral daily  pantoprazole    Tablet 40 milliGRAM(s) Oral before breakfast  sodium bicarbonate 650 milliGRAM(s) Oral three times a day    MEDICATIONS  (PRN):  dextrose Oral Gel 15 Gram(s) Oral once PRN Blood Glucose LESS THAN 70 milliGRAM(s)/deciliter  melatonin 3 milliGRAM(s) Oral at bedtime PRN Insomnia      Allergies    No Known Allergies    Intolerances        REVIEW OF SYSTEMS:  CONSTITUTIONAL: No weakness, fevers or chills  EYES/ENT: No visual changes;  No vertigo or throat pain   NECK: No pain or stiffness  RESPIRATORY: No cough, wheezing, hemoptysis; No shortness of breath  CARDIOVASCULAR: No chest pain or palpitations  GASTROINTESTINAL: No abdominal or epigastric pain. No nausea, vomiting, or hematemesis; No diarrhea or constipation. No melena or hematochezia.  GENITOURINARY: No dysuria, frequency or hematuria  NEUROLOGICAL: No numbness or weakness  SKIN: No itching, rashes  [ ] All other systems negative  [ ] Unable to assess ROS because ________    OBJECTIVE:  ICU Vital Signs Last 24 Hrs  T(C): --  T(F): --  HR: 61 (22 Sep 2023 09:24) (60 - 62)  BP: 133/105 (22 Sep 2023 09:00) (105/62 - 149/75)  BP(mean): --  ABP: --  ABP(mean): --  RR: 18 (22 Sep 2023 09:00) (18 - 18)  SpO2: 98% (22 Sep 2023 09:24) (98% - 100%)    O2 Parameters below as of 22 Sep 2023 09:00  Patient On (Oxygen Delivery Method): nasal cannula  O2 Flow (L/min): 3            09-21 @ 07:01  -  09-22 @ 07:00  --------------------------------------------------------  IN: 0 mL / OUT: 0 mL / NET: 0 mL    09-22 @ 07:01 - 09-22 @ 11:57  --------------------------------------------------------  IN: 50 mL / OUT: 0 mL / NET: 50 mL      CAPILLARY BLOOD GLUCOSE      POCT Blood Glucose.: 196 mg/dL (22 Sep 2023 08:42)      PHYSICAL EXAM:  GENERAL: NAD, lying in bed comfortably  HEAD:  Atraumatic, normocephalic  EYES: EOMI, PERRLA, conjunctiva and sclera clear  ENT: Moist mucous membranes  NECK: Supple, no JVD  HEART: S1, S2, regular rate and rhythm, no murmurs, rubs, or gallops  LUNGS: Unlabored respirations.  Clear to auscultation bilaterally, no crackles, wheezing, or rhonchi  ABDOMEN: Soft, nontender, nondistended, +BS  EXTREMITIES: 2+ peripheral pulses bilaterally. No clubbing, cyanosis, or edema  NERVOUS SYSTEM:  A&Ox3, no focal deficits   SKIN: No rashes or lesions  LINES:     LABS:                        7.9    7.28  )-----------( 233      ( 22 Sep 2023 08:04 )             23.5     Hgb Trend: 7.9<--, 8.1<--, 8.7<--, 8.9<--  09-22    121<L>  |  85<L>  |  113<H>  ----------------------------<  173<H>  4.7   |  14<L>  |  3.89<H>    Ca    9.0      22 Sep 2023 08:04  Phos  7.5     09-22  Mg     1.90     09-22    TPro  7.9  /  Alb  3.9  /  TBili  0.4  /  DBili  x   /  AST  29  /  ALT  26  /  AlkPhos  936<H>  09-22    Creatinine Trend: 3.89<--, 3.71<--, 3.66<--, 3.76<--, 3.92<--, 3.94<--  PT/INR - ( 22 Sep 2023 08:04 )   PT: 14.9 sec;   INR: 1.34 ratio         PTT - ( 22 Sep 2023 08:04 )  PTT:52.2 sec  Urinalysis Basic - ( 22 Sep 2023 08:04 )    Color: x / Appearance: x / SG: x / pH: x  Gluc: 173 mg/dL / Ketone: x  / Bili: x / Urobili: x   Blood: x / Protein: x / Nitrite: x   Leuk Esterase: x / RBC: x / WBC x   Sq Epi: x / Non Sq Epi: x / Bacteria: x        Venous Blood Gas:  09-22 @ 08:04  7.19/36/70/14/94.8  VBG Lactate: 0.8  Venous Blood Gas:  09-22 @ 01:13  7.17/40/77/15/94.8  VBG Lactate: 1.4  Venous Blood Gas:  09-20 @ 21:42  7.20/36/48/14/76.4  VBG Lactate: 0.9  Venous Blood Gas:  09-20 @ 19:52  7.20/38/43/15/67.8  VBG Lactate: 1.1  Venous Blood Gas:  09-20 @ 17:46  7.23/34/34/14/54.4  VBG Lactate: 1.6      MICROBIOLOGY:     RADIOLOGY & ADDITIONAL TESTS:    ASSESSMENT  AIDE HERNANDEZ is a 58y female with PMH *** in the hospital for 2d transferred to the MICU for ***.    PLAN  =====Neurologic=====  Patient is AOx4    =====Pulmonary=====  #Dyspnea   Plan: Acute on chronic right sided CHF vs progressive renal failure vs PE (will need to r/o with PE, but given kidney dysfunction will do dopplers of LE first to r/o DVT given high risk of ESRD with contrast).   -STAT duplex of lower extremity reordered to r/o DVT  -troponin 65->64  -pro-BNP 2092   -RVP negative   Most recent TTE (8/6) showing HFpEF with elevated RVSP and RV dilation.   -TTE ordered   - escalated to bumex drip, poor UOP  - Currently on 3L NC, kai    =====Cardiovascular=====  #Acute on chronic diastolic congestive heart failure  - Bumex gtt 2mg/hr->Urgent HD for volume removal due to anuria  - Hydralazine 100mg TID (hold SBP<90)  - Isosorbide dinitrate 10mg TID (hold SBP<90)  - Strict I/O  - Daily standing weights  - Monitor lytes replete K>4.0 and Mg>2.0   - Trend SCr  - thyroid function in AM    =====GI=====  #GERD  - Protonix 40mg IV QD    #Diet  - NPO    =====Renal/=====  #Electrolytes  - Maintain K>4, Phos>3, Mag>2, iCal>1    #Hyponatremia  - Pt. with hypervolemia hyponatremia.   - Last SNa low at 121.   - Pt. initiated on IV Bumex infusion for hypervolemia management->Urgent HD  - fluid restriction (<1 L/day).   - Monitor SNa q8 hrs.   - Avoid overcorrection of SNa.    #Acute renal failure superimposed on chronic kidney disease.   - Pt with CKD 3-4, baseline cr approximately 2, now with ISAMAR on CKD  - Monitor renal function/UOP, avoid nephrotoxins  - Sodium Bicarb Tabs  - urgent HD today per nephro for volume overload and anuria,     #Metabolic acidosis  Pt. with metabolic acidosis in the setting of volume overload and renal failure. pH is low at 7.19, and SCO2 decreased to 14 today.   - Plan to initiate HD, as outlined above.   - Monitor SCO2.    =====Endocrine=====  #DM2  - While NPO, FSBG and ADRIAN q6h    =====Infectious Disease=====  Patient is afebrile and is not currently being treated for any infection.    =====Heme/Onc=====  #DVT Ppx  - heparin 5000 subQ    =====Ethics=====  FULL CODE MICU ACCEPT NOTE    CHIEF COMPLAINT: Patient is a 58y old  Female who presents with a chief complaint of progressive dyspnea (22 Sep 2023 11:40)      HPI:  Ms. Hernandez is a 59 yo F w/ HTN, HLD, DM2, CKD (stage 3-4), hypothyroidism (c/b myxedema coma in past), severe pulmonary hypertension, mod-severe TR, asthma, HFpEF, presents for progressive dyspnea at rest with left sided chest tightness. She reports SOB on exertion as well. She reports over last few days she has had non-productive cough, decrease PO intake, nausea this morning, and diarrhea (without blood) this morning. She was placed on BIPAP in the ED. CXR was sig for small bilateral pleural effusions. Labs were sig for the following: BUN/Cr 110/3.92, Na 122 (last 133 in August), CO2 15, AG 20, -290, d-dimer 3509, WBC 9.19, Hb 8.9/26.7, (8.2/1245), MCV 75.4, RDW 14.6, neutrophil count 87.1%, alk phos 1093, pro-BNP 2092, LA 0.9, pCO2 36.     Most recent TTE (8/8) showing HFpEF with elevated RVSP and RV dilation.   Most recent RHC (8/28): RA 17 mmHg. PA 51/22/36 mmHg. PCW 25 mmHg. CO 6.69 L/min with CI 4.25 L/min/m2 (Hgb 6.8).       PAST MEDICAL & SURGICAL HISTORY:  HTN (hypertension)      HLD (hyperlipidemia)      DM (diabetes mellitus)      Hypothyroid      H/O pulmonary hypertension      CKD (chronic kidney disease)      (HFpEF) heart failure with preserved ejection fraction      S/P cataract surgery          FAMILY HISTORY:  FH: stroke (Father)        SOCIAL HISTORY:  Smoking:   Substance Use:   EtOH Use:   Advance Directives:     MEDICATIONS  (STANDING):  atorvastatin 40 milliGRAM(s) Oral at bedtime  buMETAnide Infusion 2 mG/Hr (10 mL/Hr) IV Continuous <Continuous>  dextrose 5%. 1000 milliLiter(s) (100 mL/Hr) IV Continuous <Continuous>  dextrose 5%. 1000 milliLiter(s) (50 mL/Hr) IV Continuous <Continuous>  dextrose 50% Injectable 25 Gram(s) IV Push once  dextrose 50% Injectable 12.5 Gram(s) IV Push once  dextrose 50% Injectable 25 Gram(s) IV Push once  glucagon  Injectable 1 milliGRAM(s) IntraMuscular once  heparin   Injectable 5000 Unit(s) SubCutaneous every 8 hours  hydrALAZINE 100 milliGRAM(s) Oral three times a day  insulin lispro (ADMELOG) corrective regimen sliding scale   SubCutaneous three times a day before meals  insulin lispro (ADMELOG) corrective regimen sliding scale   SubCutaneous at bedtime  isosorbide   dinitrate Tablet (ISORDIL) 10 milliGRAM(s) Oral three times a day  levothyroxine 125 MICROGram(s) Oral daily  NIFEdipine XL 60 milliGRAM(s) Oral daily  pantoprazole    Tablet 40 milliGRAM(s) Oral before breakfast  sodium bicarbonate 650 milliGRAM(s) Oral three times a day    MEDICATIONS  (PRN):  dextrose Oral Gel 15 Gram(s) Oral once PRN Blood Glucose LESS THAN 70 milliGRAM(s)/deciliter  melatonin 3 milliGRAM(s) Oral at bedtime PRN Insomnia      Allergies    No Known Allergies    Intolerances        REVIEW OF SYSTEMS:  CONSTITUTIONAL: No weakness, fevers or chills  EYES/ENT: No visual changes;  No vertigo or throat pain   NECK: No pain or stiffness  RESPIRATORY: No cough, wheezing, hemoptysis; No shortness of breath  CARDIOVASCULAR: No chest pain or palpitations  GASTROINTESTINAL: No abdominal or epigastric pain. No nausea, vomiting, or hematemesis; No diarrhea or constipation. No melena or hematochezia.  GENITOURINARY: No dysuria, frequency or hematuria  NEUROLOGICAL: No numbness or weakness  SKIN: No itching, rashes  [ ] All other systems negative  [ ] Unable to assess ROS because ________    OBJECTIVE:  ICU Vital Signs Last 24 Hrs  T(C): --  T(F): --  HR: 61 (22 Sep 2023 09:24) (60 - 62)  BP: 133/105 (22 Sep 2023 09:00) (105/62 - 149/75)  BP(mean): --  ABP: --  ABP(mean): --  RR: 18 (22 Sep 2023 09:00) (18 - 18)  SpO2: 98% (22 Sep 2023 09:24) (98% - 100%)    O2 Parameters below as of 22 Sep 2023 09:00  Patient On (Oxygen Delivery Method): nasal cannula  O2 Flow (L/min): 3            09-21 @ 07:01  -  09-22 @ 07:00  --------------------------------------------------------  IN: 0 mL / OUT: 0 mL / NET: 0 mL    09-22 @ 07:01 - 09-22 @ 11:57  --------------------------------------------------------  IN: 50 mL / OUT: 0 mL / NET: 50 mL      CAPILLARY BLOOD GLUCOSE      POCT Blood Glucose.: 196 mg/dL (22 Sep 2023 08:42)      PHYSICAL EXAM:  GENERAL: NAD, lying in bed comfortably  HEAD:  Atraumatic, normocephalic  EYES: EOMI, PERRLA, conjunctiva and sclera clear  ENT: Moist mucous membranes  NECK: Supple, no JVD  HEART: S1, S2, regular rate and rhythm, no murmurs, rubs, or gallops  LUNGS: Unlabored respirations.  Clear to auscultation bilaterally, no crackles, wheezing, or rhonchi  ABDOMEN: Soft, nontender, nondistended, +BS  EXTREMITIES: 2+ peripheral pulses bilaterally. No clubbing, cyanosis, or edema  NERVOUS SYSTEM:  A&Ox3, no focal deficits   SKIN: No rashes or lesions  LINES:     LABS:                        7.9    7.28  )-----------( 233      ( 22 Sep 2023 08:04 )             23.5     Hgb Trend: 7.9<--, 8.1<--, 8.7<--, 8.9<--  09-22    121<L>  |  85<L>  |  113<H>  ----------------------------<  173<H>  4.7   |  14<L>  |  3.89<H>    Ca    9.0      22 Sep 2023 08:04  Phos  7.5     09-22  Mg     1.90     09-22    TPro  7.9  /  Alb  3.9  /  TBili  0.4  /  DBili  x   /  AST  29  /  ALT  26  /  AlkPhos  936<H>  09-22    Creatinine Trend: 3.89<--, 3.71<--, 3.66<--, 3.76<--, 3.92<--, 3.94<--  PT/INR - ( 22 Sep 2023 08:04 )   PT: 14.9 sec;   INR: 1.34 ratio         PTT - ( 22 Sep 2023 08:04 )  PTT:52.2 sec  Urinalysis Basic - ( 22 Sep 2023 08:04 )    Color: x / Appearance: x / SG: x / pH: x  Gluc: 173 mg/dL / Ketone: x  / Bili: x / Urobili: x   Blood: x / Protein: x / Nitrite: x   Leuk Esterase: x / RBC: x / WBC x   Sq Epi: x / Non Sq Epi: x / Bacteria: x        Venous Blood Gas:  09-22 @ 08:04  7.19/36/70/14/94.8  VBG Lactate: 0.8  Venous Blood Gas:  09-22 @ 01:13  7.17/40/77/15/94.8  VBG Lactate: 1.4  Venous Blood Gas:  09-20 @ 21:42  7.20/36/48/14/76.4  VBG Lactate: 0.9  Venous Blood Gas:  09-20 @ 19:52  7.20/38/43/15/67.8  VBG Lactate: 1.1  Venous Blood Gas:  09-20 @ 17:46  7.23/34/34/14/54.4  VBG Lactate: 1.6      MICROBIOLOGY:     RADIOLOGY & ADDITIONAL TESTS:    ASSESSMENT  Ms. Hernandez is a 59 yo F w/ HTN, HLD, DM2, CKD (stage 3-4), hypothyroidism (c/b myxedema coma in past), severe pulmonary hypertension, mod-severe TR, asthma, HFpEF, presents for progressive dyspnea at rest with left sided chest tightness, found to be in ADHF, admitted to MICU for urgent HD due to acidosis and volume OL, anuric on bumex drip    PLAN  =====Neurologic=====  No active issues    =====Pulmonary=====  #Dyspnea   Plan: Acute on chronic right sided CHF vs progressive renal failure vs PE (will need to r/o with PE, but given kidney dysfunction will do dopplers of LE first to r/o DVT given high risk of ESRD with contrast).   -STAT duplex of lower extremity reordered to r/o DVT  -troponin 65->64  -pro-BNP 2092   -RVP negative   Most recent TTE (8/6) showing HFpEF with elevated RVSP and RV dilation.   -TTE ordered   - escalated to bumex drip, poor UOP  - Currently on 3L NC, kai    =====Cardiovascular=====  #Acute on chronic diastolic congestive heart failure  - Last EF 8/2023 69%  - Bumex gtt 2mg/hr->Urgent HD for volume removal due to anuria  - Strict I/O  - Daily standing weights  - Monitor lytes replete K>4.0 and Mg>2.0   - Trend SCr  - HF following, appreciate recommendations  - thyroid function in AM    #HTN  - Hydralazine. Isosorbide dinitrate, Nifedipine    =====GI=====  #GERD  - Protonix 40mg IV QD    #Diet  - NPO    =====Renal/=====  #Electrolytes  - Maintain K>4, Phos>3, Mag>2, iCal>1    #Hyponatremia  - Pt. with hypervolemia hyponatremia.   - Last SNa low at 121.   - Pt. initiated on IV Bumex infusion for hypervolemia management->Urgent HD  - fluid restriction (<1 L/day).   - Monitor SNa q8 hrs.   - Avoid overcorrection of SNa.    #Acute renal failure superimposed on chronic kidney disease.   - Pt with CKD 3-4, baseline cr approximately 2, now with ISAMAR on CKD  - Monitor renal function/UOP, avoid nephrotoxins  - Sodium Bicarb Tabs  - urgent HD today per nephro for volume overload and anuria,     #Metabolic acidosis  Pt. with metabolic acidosis in the setting of volume overload and renal failure. pH is low at 7.19, and SCO2 decreased to 14 today.   - Plan to initiate HD, as outlined above.   - Monitor SCO2.    =====Endocrine=====  #DM2  - While NPO, FSBG and ADRIAN q6h    =====Infectious Disease=====  Patient is afebrile and is not currently being treated for any infection.    =====Heme/Onc=====  #DVT Ppx  - heparin 5000 subQ    =====Ethics=====  FULL CODE MICU ACCEPT NOTE    CHIEF COMPLAINT: Patient is a 58y old  Female who presents with a chief complaint of progressive dyspnea (22 Sep 2023 11:40)      HPI:  Ms. Hernandez is a 57 yo F w/ HTN, HLD, DM2, CKD (stage 3-4), hypothyroidism (c/b myxedema coma in past), severe pulmonary hypertension, mod-severe TR, asthma, HFpEF, presents for progressive dyspnea at rest with left sided chest tightness. She reports SOB on exertion as well. She reports over last few days she has had non-productive cough, decrease PO intake, nausea this morning, and diarrhea (without blood) this morning. She was placed on BIPAP in the ED. CXR was sig for small bilateral pleural effusions. Labs were sig for the following: BUN/Cr 110/3.92, Na 122 (last 133 in August), CO2 15, AG 20, -290, d-dimer 3509, WBC 9.19, Hb 8.9/26.7, (8.2/1245), MCV 75.4, RDW 14.6, neutrophil count 87.1%, alk phos 1093, pro-BNP 2092, LA 0.9, pCO2 36.     Most recent TTE (8/8) showing HFpEF with elevated RVSP and RV dilation.   Most recent RHC (8/28): RA 17 mmHg. PA 51/22/36 mmHg. PCW 25 mmHg. CO 6.69 L/min with CI 4.25 L/min/m2 (Hgb 6.8).       PAST MEDICAL & SURGICAL HISTORY:  HTN (hypertension)      HLD (hyperlipidemia)      DM (diabetes mellitus)      Hypothyroid      H/O pulmonary hypertension      CKD (chronic kidney disease)      (HFpEF) heart failure with preserved ejection fraction      S/P cataract surgery          FAMILY HISTORY:  FH: stroke (Father)        SOCIAL HISTORY:  Smoking:   Substance Use:   EtOH Use:   Advance Directives:     MEDICATIONS  (STANDING):  atorvastatin 40 milliGRAM(s) Oral at bedtime  buMETAnide Infusion 2 mG/Hr (10 mL/Hr) IV Continuous <Continuous>  dextrose 5%. 1000 milliLiter(s) (100 mL/Hr) IV Continuous <Continuous>  dextrose 5%. 1000 milliLiter(s) (50 mL/Hr) IV Continuous <Continuous>  dextrose 50% Injectable 25 Gram(s) IV Push once  dextrose 50% Injectable 12.5 Gram(s) IV Push once  dextrose 50% Injectable 25 Gram(s) IV Push once  glucagon  Injectable 1 milliGRAM(s) IntraMuscular once  heparin   Injectable 5000 Unit(s) SubCutaneous every 8 hours  hydrALAZINE 100 milliGRAM(s) Oral three times a day  insulin lispro (ADMELOG) corrective regimen sliding scale   SubCutaneous three times a day before meals  insulin lispro (ADMELOG) corrective regimen sliding scale   SubCutaneous at bedtime  isosorbide   dinitrate Tablet (ISORDIL) 10 milliGRAM(s) Oral three times a day  levothyroxine 125 MICROGram(s) Oral daily  NIFEdipine XL 60 milliGRAM(s) Oral daily  pantoprazole    Tablet 40 milliGRAM(s) Oral before breakfast  sodium bicarbonate 650 milliGRAM(s) Oral three times a day    MEDICATIONS  (PRN):  dextrose Oral Gel 15 Gram(s) Oral once PRN Blood Glucose LESS THAN 70 milliGRAM(s)/deciliter  melatonin 3 milliGRAM(s) Oral at bedtime PRN Insomnia      Allergies    No Known Allergies    Intolerances        REVIEW OF SYSTEMS:  CONSTITUTIONAL: No weakness, fevers or chills  EYES/ENT: No visual changes;  No vertigo or throat pain   NECK: No pain or stiffness  RESPIRATORY: No cough, wheezing, hemoptysis; No shortness of breath  CARDIOVASCULAR: No chest pain or palpitations  GASTROINTESTINAL: No abdominal or epigastric pain. No nausea, vomiting, or hematemesis; No diarrhea or constipation. No melena or hematochezia.  GENITOURINARY: No dysuria, frequency or hematuria  NEUROLOGICAL: No numbness or weakness  SKIN: No itching, rashes  [ ] All other systems negative  [ ] Unable to assess ROS because ________    OBJECTIVE:  ICU Vital Signs Last 24 Hrs  T(C): --  T(F): --  HR: 61 (22 Sep 2023 09:24) (60 - 62)  BP: 133/105 (22 Sep 2023 09:00) (105/62 - 149/75)  BP(mean): --  ABP: --  ABP(mean): --  RR: 18 (22 Sep 2023 09:00) (18 - 18)  SpO2: 98% (22 Sep 2023 09:24) (98% - 100%)    O2 Parameters below as of 22 Sep 2023 09:00  Patient On (Oxygen Delivery Method): nasal cannula  O2 Flow (L/min): 3            09-21 @ 07:01  -  09-22 @ 07:00  --------------------------------------------------------  IN: 0 mL / OUT: 0 mL / NET: 0 mL    09-22 @ 07:01 - 09-22 @ 11:57  --------------------------------------------------------  IN: 50 mL / OUT: 0 mL / NET: 50 mL      CAPILLARY BLOOD GLUCOSE      POCT Blood Glucose.: 196 mg/dL (22 Sep 2023 08:42)      PHYSICAL EXAM:  GENERAL: NAD, lying in bed comfortably  HEAD:  Atraumatic, normocephalic  EYES: EOMI, PERRLA, conjunctiva and sclera clear  ENT: Moist mucous membranes  NECK: Supple, no JVD  HEART: S1, S2, regular rate and rhythm, no murmurs, rubs, or gallops  LUNGS: Unlabored respirations.  Clear to auscultation bilaterally, no crackles, wheezing, or rhonchi  ABDOMEN: Soft, nontender, nondistended, +BS  EXTREMITIES: 2+ peripheral pulses bilaterally. No clubbing, cyanosis, or edema  NERVOUS SYSTEM:  A&Ox3, no focal deficits   SKIN: No rashes or lesions  LINES:     LABS:                        7.9    7.28  )-----------( 233      ( 22 Sep 2023 08:04 )             23.5     Hgb Trend: 7.9<--, 8.1<--, 8.7<--, 8.9<--  09-22    121<L>  |  85<L>  |  113<H>  ----------------------------<  173<H>  4.7   |  14<L>  |  3.89<H>    Ca    9.0      22 Sep 2023 08:04  Phos  7.5     09-22  Mg     1.90     09-22    TPro  7.9  /  Alb  3.9  /  TBili  0.4  /  DBili  x   /  AST  29  /  ALT  26  /  AlkPhos  936<H>  09-22    Creatinine Trend: 3.89<--, 3.71<--, 3.66<--, 3.76<--, 3.92<--, 3.94<--  PT/INR - ( 22 Sep 2023 08:04 )   PT: 14.9 sec;   INR: 1.34 ratio         PTT - ( 22 Sep 2023 08:04 )  PTT:52.2 sec  Urinalysis Basic - ( 22 Sep 2023 08:04 )    Color: x / Appearance: x / SG: x / pH: x  Gluc: 173 mg/dL / Ketone: x  / Bili: x / Urobili: x   Blood: x / Protein: x / Nitrite: x   Leuk Esterase: x / RBC: x / WBC x   Sq Epi: x / Non Sq Epi: x / Bacteria: x        Venous Blood Gas:  09-22 @ 08:04  7.19/36/70/14/94.8  VBG Lactate: 0.8  Venous Blood Gas:  09-22 @ 01:13  7.17/40/77/15/94.8  VBG Lactate: 1.4  Venous Blood Gas:  09-20 @ 21:42  7.20/36/48/14/76.4  VBG Lactate: 0.9  Venous Blood Gas:  09-20 @ 19:52  7.20/38/43/15/67.8  VBG Lactate: 1.1  Venous Blood Gas:  09-20 @ 17:46  7.23/34/34/14/54.4  VBG Lactate: 1.6      MICROBIOLOGY:     RADIOLOGY & ADDITIONAL TESTS:    ASSESSMENT  Ms. Hernandez is a 57 yo F w/ HTN, HLD, DM2, CKD (stage 3-4), hypothyroidism (c/b myxedema coma in past), severe pulmonary hypertension, mod-severe TR, asthma, HFpEF, presents for progressive dyspnea at rest with left sided chest tightness, found to be in ADHF, admitted to MICU for urgent HD due to acidosis and volume OL, anuric on bumex drip    PLAN  =====Neurologic=====  No active issues    =====Pulmonary=====  #Dyspnea   Plan: Acute on chronic right sided CHF vs progressive renal failure vs PE (will need to r/o with PE, but given kidney dysfunction will do dopplers of LE first to r/o DVT given high risk of ESRD with contrast).   -STAT duplex of lower extremity reordered to r/o DVT  -troponin 65->64  -pro-BNP 2092   -RVP negative   Most recent TTE (8/6) showing HFpEF with elevated RVSP and RV dilation.   -TTE ordered   - escalated to bumex drip, poor UOP  - Currently on 3L NC, kai    =====Cardiovascular=====  #Acute on chronic diastolic congestive heart failure  - Last EF 8/2023 69%  - Bumex gtt 2mg/hr->Urgent HD for volume removal due to anuria  - Strict I/O  - Daily standing weights  - Monitor lytes replete K>4.0 and Mg>2.0   - Trend SCr  - HF following, appreciate recommendations    #HTN  - Hydralazine. Isosorbide dinitrate, Nifedipine    =====GI=====  #GERD  - Protonix 40mg IV QD    #Diet  - NPO except medications    =====Renal/=====  #Electrolytes  - Maintain K>4, Phos>3, Mag>2, iCal>1    #Hyponatremia  - Pt. with hypervolemia hyponatremia.   - Last SNa low at 121.   - Pt. initiated on IV Bumex infusion for hypervolemia management->Urgent HD  - fluid restriction (<1 L/day).   - Monitor SNa q8 hrs.   - Avoid overcorrection of SNa.    #Acute renal failure superimposed on chronic kidney disease.   - Pt with CKD 3-4, baseline cr approximately 2, now with ISAMAR on CKD  - Monitor renal function/UOP, avoid nephrotoxins  - Sodium Bicarb Tabs  - urgent HD today per nephro for volume overload and anuria,     #Metabolic acidosis  Pt. with metabolic acidosis in the setting of volume overload and renal failure. pH is low at 7.19, and SCO2 decreased to 14 today.   - Plan to initiate HD, as outlined above.   - Monitor SCO2    =====Endocrine=====  #DM2  - While NPO, FSBG and ADRIAN q6h    #Hypothyroidism  - Synthroid 125 mcg daily    =====Infectious Disease=====  Patient is afebrile and is not currently being treated for any infection.    =====Heme/Onc=====  #DVT Ppx  - heparin 5000 subQ    #Anemia  - Hgb 7-8, not clinically bleeding. Likely in s/o renal disease.   - CTM, maintain Hgb >7    =====Ethics=====  FULL CODE MICU ACCEPT NOTE    CHIEF COMPLAINT: Patient is a 58y old  Female who presents with a chief complaint of progressive dyspnea (22 Sep 2023 11:40)      HPI:  Ms. Hernandez is a 57 yo F w/ HTN, HLD, DM2, CKD (stage 3-4), hypothyroidism (c/b myxedema coma in past), severe pulmonary hypertension, mod-severe TR, asthma, HFpEF, presents for progressive dyspnea at rest with left sided chest tightness. She reports SOB on exertion as well. She reports over last few days she has had non-productive cough, decrease PO intake, nausea this morning, and diarrhea (without blood) this morning. She was placed on BIPAP in the ED. CXR was sig for small bilateral pleural effusions. Labs were sig for the following: BUN/Cr 110/3.92, Na 122 (last 133 in August), CO2 15, AG 20, -290, d-dimer 3509, WBC 9.19, Hb 8.9/26.7, (8.2/1245), MCV 75.4, RDW 14.6, neutrophil count 87.1%, alk phos 1093, pro-BNP 2092, LA 0.9, pCO2 36.     Most recent TTE (8/8) showing HFpEF with elevated RVSP and RV dilation.   Most recent RHC (8/28): RA 17 mmHg. PA 51/22/36 mmHg. PCW 25 mmHg. CO 6.69 L/min with CI 4.25 L/min/m2 (Hgb 6.8).       PAST MEDICAL & SURGICAL HISTORY:  HTN (hypertension)      HLD (hyperlipidemia)      DM (diabetes mellitus)      Hypothyroid      H/O pulmonary hypertension      CKD (chronic kidney disease)      (HFpEF) heart failure with preserved ejection fraction      S/P cataract surgery          FAMILY HISTORY:  FH: stroke (Father)        SOCIAL HISTORY:  Smoking:   Substance Use:   EtOH Use:   Advance Directives:     MEDICATIONS  (STANDING):  atorvastatin 40 milliGRAM(s) Oral at bedtime  buMETAnide Infusion 2 mG/Hr (10 mL/Hr) IV Continuous <Continuous>  dextrose 5%. 1000 milliLiter(s) (100 mL/Hr) IV Continuous <Continuous>  dextrose 5%. 1000 milliLiter(s) (50 mL/Hr) IV Continuous <Continuous>  dextrose 50% Injectable 25 Gram(s) IV Push once  dextrose 50% Injectable 12.5 Gram(s) IV Push once  dextrose 50% Injectable 25 Gram(s) IV Push once  glucagon  Injectable 1 milliGRAM(s) IntraMuscular once  heparin   Injectable 5000 Unit(s) SubCutaneous every 8 hours  hydrALAZINE 100 milliGRAM(s) Oral three times a day  insulin lispro (ADMELOG) corrective regimen sliding scale   SubCutaneous three times a day before meals  insulin lispro (ADMELOG) corrective regimen sliding scale   SubCutaneous at bedtime  isosorbide   dinitrate Tablet (ISORDIL) 10 milliGRAM(s) Oral three times a day  levothyroxine 125 MICROGram(s) Oral daily  NIFEdipine XL 60 milliGRAM(s) Oral daily  pantoprazole    Tablet 40 milliGRAM(s) Oral before breakfast  sodium bicarbonate 650 milliGRAM(s) Oral three times a day    MEDICATIONS  (PRN):  dextrose Oral Gel 15 Gram(s) Oral once PRN Blood Glucose LESS THAN 70 milliGRAM(s)/deciliter  melatonin 3 milliGRAM(s) Oral at bedtime PRN Insomnia      Allergies    No Known Allergies    Intolerances        REVIEW OF SYSTEMS:  CONSTITUTIONAL: No weakness, fevers or chills  EYES/ENT: No visual changes;  No vertigo or throat pain   NECK: No pain or stiffness  RESPIRATORY: No cough, wheezing, hemoptysis; No shortness of breath  CARDIOVASCULAR: No chest pain or palpitations  GASTROINTESTINAL: No abdominal or epigastric pain. No nausea, vomiting, or hematemesis; No diarrhea or constipation. No melena or hematochezia.  GENITOURINARY: No dysuria, frequency or hematuria  NEUROLOGICAL: No numbness or weakness  SKIN: No itching, rashes  [ ] All other systems negative  [ ] Unable to assess ROS because ________    OBJECTIVE:  ICU Vital Signs Last 24 Hrs  T(C): --  T(F): --  HR: 61 (22 Sep 2023 09:24) (60 - 62)  BP: 133/105 (22 Sep 2023 09:00) (105/62 - 149/75)  BP(mean): --  ABP: --  ABP(mean): --  RR: 18 (22 Sep 2023 09:00) (18 - 18)  SpO2: 98% (22 Sep 2023 09:24) (98% - 100%)    O2 Parameters below as of 22 Sep 2023 09:00  Patient On (Oxygen Delivery Method): nasal cannula  O2 Flow (L/min): 3            09-21 @ 07:01  -  09-22 @ 07:00  --------------------------------------------------------  IN: 0 mL / OUT: 0 mL / NET: 0 mL    09-22 @ 07:01 - 09-22 @ 11:57  --------------------------------------------------------  IN: 50 mL / OUT: 0 mL / NET: 50 mL      CAPILLARY BLOOD GLUCOSE      POCT Blood Glucose.: 196 mg/dL (22 Sep 2023 08:42)      PHYSICAL EXAM:  GENERAL: NAD, lying in bed comfortably  HEAD:  Atraumatic, normocephalic  EYES: EOMI, PERRLA, conjunctiva and sclera clear  ENT: Moist mucous membranes  NECK: Supple, no JVD  HEART: S1, S2, regular rate and rhythm, no murmurs, rubs, or gallops  LUNGS: Unlabored respirations.  Clear to auscultation bilaterally, no crackles, wheezing, or rhonchi  ABDOMEN: Soft, nontender, nondistended, +BS  EXTREMITIES: 2+ peripheral pulses bilaterally. No clubbing, cyanosis, or edema  NERVOUS SYSTEM:  A&Ox3, no focal deficits   SKIN: No rashes or lesions  LINES:     LABS:                        7.9    7.28  )-----------( 233      ( 22 Sep 2023 08:04 )             23.5     Hgb Trend: 7.9<--, 8.1<--, 8.7<--, 8.9<--  09-22    121<L>  |  85<L>  |  113<H>  ----------------------------<  173<H>  4.7   |  14<L>  |  3.89<H>    Ca    9.0      22 Sep 2023 08:04  Phos  7.5     09-22  Mg     1.90     09-22    TPro  7.9  /  Alb  3.9  /  TBili  0.4  /  DBili  x   /  AST  29  /  ALT  26  /  AlkPhos  936<H>  09-22    Creatinine Trend: 3.89<--, 3.71<--, 3.66<--, 3.76<--, 3.92<--, 3.94<--  PT/INR - ( 22 Sep 2023 08:04 )   PT: 14.9 sec;   INR: 1.34 ratio         PTT - ( 22 Sep 2023 08:04 )  PTT:52.2 sec  Urinalysis Basic - ( 22 Sep 2023 08:04 )    Color: x / Appearance: x / SG: x / pH: x  Gluc: 173 mg/dL / Ketone: x  / Bili: x / Urobili: x   Blood: x / Protein: x / Nitrite: x   Leuk Esterase: x / RBC: x / WBC x   Sq Epi: x / Non Sq Epi: x / Bacteria: x        Venous Blood Gas:  09-22 @ 08:04  7.19/36/70/14/94.8  VBG Lactate: 0.8  Venous Blood Gas:  09-22 @ 01:13  7.17/40/77/15/94.8  VBG Lactate: 1.4  Venous Blood Gas:  09-20 @ 21:42  7.20/36/48/14/76.4  VBG Lactate: 0.9  Venous Blood Gas:  09-20 @ 19:52  7.20/38/43/15/67.8  VBG Lactate: 1.1  Venous Blood Gas:  09-20 @ 17:46  7.23/34/34/14/54.4  VBG Lactate: 1.6      MICROBIOLOGY:     RADIOLOGY & ADDITIONAL TESTS:    ASSESSMENT  Ms. Hernandez is a 57 yo F w/ HTN, HLD, DM2, CKD (stage 3-4), hypothyroidism (c/b myxedema coma in past), severe pulmonary hypertension, mod-severe TR, asthma, HFpEF, presents for progressive dyspnea at rest with left sided chest tightness, found to be in ADHF, admitted to MICU for urgent HD due to acidosis and volume OL, anuric on bumex drip    PLAN  =====Neurologic=====  No active issues    =====Pulmonary=====  #Dyspnea   Plan: Acute on chronic right sided CHF vs progressive renal failure vs PE (will need to r/o with PE, but given kidney dysfunction will do dopplers of LE first to r/o DVT given high risk of ESRD with contrast).   -STAT duplex of lower extremity reordered to r/o DVT  -troponin 65->64  -pro-BNP 2092   -RVP negative   Most recent TTE (8/6) showing HFpEF with elevated RVSP and RV dilation.   -TTE ordered   - escalated to bumex drip, poor UOP  - Currently on 3L NC, kai    =====Cardiovascular=====  #Acute on chronic diastolic congestive heart failure  - Last EF 8/2023 69%  - Bumex gtt 2mg/hr->Urgent HD for volume removal due to anuria  - Strict I/O  - Daily standing weights  - Monitor lytes replete K>4.0 and Mg>2.0   - Trend SCr  - HF following, appreciate recommendations    #HTN  - Hydralazine. Isosorbide dinitrate, Nifedipine    =====GI=====  #GERD  - Protonix 40mg IV QD    #Diet  - NPO except medications    =====Renal/=====  #Electrolytes  - Maintain K>4, Phos>3, Mag>2, iCal>1    #Hyponatremia  - Pt. with hypervolemia hyponatremia.   - Last SNa low at 121.   - Pt. initiated on IV Bumex infusion for hypervolemia management->Urgent HD  - fluid restriction (<1 L/day).   - Monitor SNa q8 hrs.   - Avoid overcorrection of SNa.    #Acute renal failure superimposed on chronic kidney disease.   - Pt with CKD 3-4, baseline cr approximately 2, now with ISAMAR on CKD  - Monitor renal function/UOP, avoid nephrotoxins  - Sodium Bicarb Tabs  - urgent HD today per nephro for volume overload and anuria,     #Metabolic acidosis  Pt. with metabolic acidosis in the setting of volume overload and renal failure. pH is low at 7.19, and SCO2 decreased to 14 today.   - Plan to initiate HD, as outlined above.   - Monitor SCO2    =====Endocrine=====  #DM2  - While NPO, FSBG and ADRIAN q6h    #Hypothyroidism  - Synthroid 125 mcg daily    =====Infectious Disease=====  Patient is afebrile and is not currently being treated for any infection.    =====Heme/Onc=====  #DVT Ppx  - heparin 5000 subQ    #Anemia  - Hgb 7-8, not clinically bleeding. Likely in s/o renal disease.   - CTM, maintain Hgb >7    =====Ethics=====  FULL CODE    Attending addendum:  Agree with above. Seen and examined with team on rounds. Critically ill requiring frequent bedside visits. Acute renal failure requiring urgent HD. Supportive care. Shiley placement, electrolyte checks. Severe acidosis.   SAPNA Vilchis DO, [facility]    CHIEF COMPLAINT: Patient is a 58y old  Female who presents with a chief complaint of progressive dyspnea (22 Sep 2023 11:40)      HPI:  Ms. Hernandez is a 59 yo F w/ HTN, HLD, DM2, CKD (stage 3-4), hypothyroidism (c/b myxedema coma in past), severe pulmonary hypertension, mod-severe TR, asthma, HFpEF, presents for progressive dyspnea at rest with left sided chest tightness. She reports SOB on exertion as well. She reports over last few days she has had non-productive cough, decrease PO intake, nausea this morning, and diarrhea (without blood) this morning. She was placed on BIPAP in the ED. CXR was sig for small bilateral pleural effusions. Labs were sig for the following: BUN/Cr 110/3.92, Na 122 (last 133 in August), CO2 15, AG 20, -290, d-dimer 3509, WBC 9.19, Hb 8.9/26.7, (8.2/1245), MCV 75.4, RDW 14.6, neutrophil count 87.1%, alk phos 1093, pro-BNP 2092, LA 0.9, pCO2 36.   MICU consulted for urgent dialysis catheter (shiley) placement and HD i/s/o anuria and rising SCr and hypervolemia.     Most recent TTE (8/8) showing HFpEF with elevated RVSP and RV dilation.   Most recent RHC (8/28): RA 17 mmHg. PA 51/22/36 mmHg. PCW 25 mmHg. CO 6.69 L/min with CI 4.25 L/min/m2 (Hgb 6.8).       PAST MEDICAL & SURGICAL HISTORY:  HTN (hypertension)      HLD (hyperlipidemia)      DM (diabetes mellitus)      Hypothyroid      H/O pulmonary hypertension      CKD (chronic kidney disease)      (HFpEF) heart failure with preserved ejection fraction      S/P cataract surgery          FAMILY HISTORY:  FH: stroke (Father)        SOCIAL HISTORY:  Smoking:   Substance Use:   EtOH Use:   Advance Directives:     MEDICATIONS  (STANDING):  atorvastatin 40 milliGRAM(s) Oral at bedtime  buMETAnide Infusion 2 mG/Hr (10 mL/Hr) IV Continuous <Continuous>  dextrose 5%. 1000 milliLiter(s) (100 mL/Hr) IV Continuous <Continuous>  dextrose 5%. 1000 milliLiter(s) (50 mL/Hr) IV Continuous <Continuous>  dextrose 50% Injectable 25 Gram(s) IV Push once  dextrose 50% Injectable 12.5 Gram(s) IV Push once  dextrose 50% Injectable 25 Gram(s) IV Push once  glucagon  Injectable 1 milliGRAM(s) IntraMuscular once  heparin   Injectable 5000 Unit(s) SubCutaneous every 8 hours  hydrALAZINE 100 milliGRAM(s) Oral three times a day  insulin lispro (ADMELOG) corrective regimen sliding scale   SubCutaneous three times a day before meals  insulin lispro (ADMELOG) corrective regimen sliding scale   SubCutaneous at bedtime  isosorbide   dinitrate Tablet (ISORDIL) 10 milliGRAM(s) Oral three times a day  levothyroxine 125 MICROGram(s) Oral daily  NIFEdipine XL 60 milliGRAM(s) Oral daily  pantoprazole    Tablet 40 milliGRAM(s) Oral before breakfast  sodium bicarbonate 650 milliGRAM(s) Oral three times a day    MEDICATIONS  (PRN):  dextrose Oral Gel 15 Gram(s) Oral once PRN Blood Glucose LESS THAN 70 milliGRAM(s)/deciliter  melatonin 3 milliGRAM(s) Oral at bedtime PRN Insomnia      Allergies    No Known Allergies    Intolerances        REVIEW OF SYSTEMS:  CONSTITUTIONAL: No weakness, fevers or chills  EYES/ENT: No visual changes;  No vertigo or throat pain   NECK: No pain or stiffness  RESPIRATORY: No cough, wheezing, hemoptysis; No shortness of breath  CARDIOVASCULAR: No chest pain or palpitations  GASTROINTESTINAL: No abdominal or epigastric pain. No nausea, vomiting, or hematemesis; No diarrhea or constipation. No melena or hematochezia.  GENITOURINARY: No dysuria, frequency or hematuria  NEUROLOGICAL: No numbness or weakness  SKIN: No itching, rashes  [ ] All other systems negative  [ ] Unable to assess ROS because ________    OBJECTIVE:  ICU Vital Signs Last 24 Hrs  T(C): --  T(F): --  HR: 61 (22 Sep 2023 09:24) (60 - 62)  BP: 133/105 (22 Sep 2023 09:00) (105/62 - 149/75)  BP(mean): --  ABP: --  ABP(mean): --  RR: 18 (22 Sep 2023 09:00) (18 - 18)  SpO2: 98% (22 Sep 2023 09:24) (98% - 100%)    O2 Parameters below as of 22 Sep 2023 09:00  Patient On (Oxygen Delivery Method): nasal cannula  O2 Flow (L/min): 3            09-21 @ 07:01  -  09-22 @ 07:00  --------------------------------------------------------  IN: 0 mL / OUT: 0 mL / NET: 0 mL    09-22 @ 07:01  -  09-22 @ 11:57  --------------------------------------------------------  IN: 50 mL / OUT: 0 mL / NET: 50 mL      CAPILLARY BLOOD GLUCOSE      POCT Blood Glucose.: 196 mg/dL (22 Sep 2023 08:42)      PHYSICAL EXAM:  GENERAL: NAD, lying in bed comfortably  HEAD:  Atraumatic, normocephalic  EYES: EOMI, PERRLA, conjunctiva and sclera clear  ENT: Moist mucous membranes  NECK: Supple, no JVD  HEART: S1, S2, regular rate and rhythm, no murmurs, rubs, or gallops  LUNGS: Unlabored respirations.  Clear to auscultation bilaterally, no crackles, wheezing, or rhonchi  ABDOMEN: Soft, nontender, nondistended, +BS  EXTREMITIES: 2+ peripheral pulses bilaterally. No clubbing, cyanosis, or edema  NERVOUS SYSTEM:  A&Ox3, no focal deficits   SKIN: No rashes or lesions  LINES:     LABS:                        7.9    7.28  )-----------( 233      ( 22 Sep 2023 08:04 )             23.5     Hgb Trend: 7.9<--, 8.1<--, 8.7<--, 8.9<--  09-22    121<L>  |  85<L>  |  113<H>  ----------------------------<  173<H>  4.7   |  14<L>  |  3.89<H>    Ca    9.0      22 Sep 2023 08:04  Phos  7.5     09-22  Mg     1.90     09-22    TPro  7.9  /  Alb  3.9  /  TBili  0.4  /  DBili  x   /  AST  29  /  ALT  26  /  AlkPhos  936<H>  09-22    Creatinine Trend: 3.89<--, 3.71<--, 3.66<--, 3.76<--, 3.92<--, 3.94<--  PT/INR - ( 22 Sep 2023 08:04 )   PT: 14.9 sec;   INR: 1.34 ratio         PTT - ( 22 Sep 2023 08:04 )  PTT:52.2 sec  Urinalysis Basic - ( 22 Sep 2023 08:04 )    Color: x / Appearance: x / SG: x / pH: x  Gluc: 173 mg/dL / Ketone: x  / Bili: x / Urobili: x   Blood: x / Protein: x / Nitrite: x   Leuk Esterase: x / RBC: x / WBC x   Sq Epi: x / Non Sq Epi: x / Bacteria: x        Venous Blood Gas:  09-22 @ 08:04  7.19/36/70/14/94.8  VBG Lactate: 0.8  Venous Blood Gas:  09-22 @ 01:13  7.17/40/77/15/94.8  VBG Lactate: 1.4  Venous Blood Gas:  09-20 @ 21:42  7.20/36/48/14/76.4  VBG Lactate: 0.9  Venous Blood Gas:  09-20 @ 19:52  7.20/38/43/15/67.8  VBG Lactate: 1.1  Venous Blood Gas:  09-20 @ 17:46  7.23/34/34/14/54.4  VBG Lactate: 1.6      MICROBIOLOGY:     RADIOLOGY & ADDITIONAL TESTS:    ASSESSMENT  Ms. Hernandez is a 59 yo F w/ HTN, HLD, DM2, CKD (stage 3-4), hypothyroidism (c/b myxedema coma in past), severe pulmonary hypertension, mod-severe TR, asthma, HFpEF, presents for progressive dyspnea at rest with left sided chest tightness, found to be in ADHF, admitted to MICU for urgent HD due to acidosis and volume OL, anuric on bumex drip    PLAN  =====Neurologic=====  No active issues    =====Pulmonary=====  #Dyspnea   Plan: Acute on chronic right sided CHF vs progressive renal failure vs PE (will need to r/o with PE, but given kidney dysfunction will do dopplers of LE first to r/o DVT given high risk of ESRD with contrast).   -STAT duplex of lower extremity reordered to r/o DVT  -troponin 65->64  -pro-BNP 2092   -RVP negative   Most recent TTE (8/6) showing HFpEF with elevated RVSP and RV dilation.   -TTE ordered   - escalated to bumex drip, poor UOP  - Currently on 3L NC, kai    =====Cardiovascular=====  #Acute on chronic diastolic congestive heart failure  - Last EF 8/2023 69%  - Bumex gtt 2mg/hr->Urgent HD for volume removal due to anuria  - Strict I/O  - Daily standing weights  - Monitor lytes replete K>4.0 and Mg>2.0   - Trend SCr  - HF following, appreciate recommendations    #HTN  - Hydralazine. Isosorbide dinitrate, Nifedipine    =====GI=====  #GERD  - Protonix 40mg IV QD    #Diet  - NPO except medications    =====Renal/=====  #Electrolytes  - Maintain K>4, Phos>3, Mag>2, iCal>1    #Hyponatremia  - Pt. with hypervolemia hyponatremia.   - Last SNa low at 121.   - Pt. initiated on IV Bumex infusion for hypervolemia management->Urgent HD  - fluid restriction (<1 L/day).   - Monitor SNa q8 hrs.   - Avoid overcorrection of SNa.    #Acute renal failure superimposed on chronic kidney disease.   - Pt with CKD 3-4, baseline cr approximately 2, now with ISAMAR on CKD  - Monitor renal function/UOP, avoid nephrotoxins  - Sodium Bicarb Tabs  - urgent HD today per nephro for volume overload and anuria,     #Metabolic acidosis  Pt. with metabolic acidosis in the setting of volume overload and renal failure. pH is low at 7.19, and SCO2 decreased to 14 today.   - Plan to initiate HD, as outlined above.   - Monitor SCO2    =====Endocrine=====  #DM2  - While NPO, FSBG and ADRIAN q6h    #Hypothyroidism  - Synthroid 125 mcg daily    =====Infectious Disease=====  Patient is afebrile and is not currently being treated for any infection.    =====Heme/Onc=====  #DVT Ppx  - heparin 5000 subQ    #Anemia  - Hgb 7-8, not clinically bleeding. Likely in s/o renal disease.   - CTM, maintain Hgb >7    =====Ethics=====  FULL CODE    Attending addendum:  Agree with above. Seen and examined with team on rounds. Critically ill requiring frequent bedside visits. Acute renal failure requiring urgent HD. Supportive care. Shiley placement, electrolyte checks. Severe acidosis.   SAPNA Vilchis DO, FCCP MICU ACCEPT NOTE    CHIEF COMPLAINT: Patient is a 58y old  Female who presents with a chief complaint of progressive dyspnea (22 Sep 2023 11:40)      HPI:  Ms. Hernandez is a 57 yo F w/ HTN, HLD, DM2, CKD (stage 3-4), hypothyroidism (c/b myxedema coma in past), severe pulmonary hypertension, mod-severe TR, asthma, HFpEF, presents for progressive dyspnea at rest with left sided chest tightness. She reports SOB on exertion as well. She reports over last few days she has had non-productive cough, decrease PO intake, nausea this morning, and diarrhea (without blood) this morning. She was placed on BIPAP in the ED. CXR was sig for small bilateral pleural effusions. Labs were sig for the following: BUN/Cr 110/3.92, Na 122 (last 133 in August), CO2 15, AG 20, -290, d-dimer 3509, WBC 9.19, Hb 8.9/26.7, (8.2/1245), MCV 75.4, RDW 14.6, neutrophil count 87.1%, alk phos 1093, pro-BNP 2092, LA 0.9, pCO2 36.   MICU consulted for urgent dialysis catheter (shiley) placement and HD i/s/o anuria and rising SCr and hypervolemia.     Most recent TTE (8/8) showing HFpEF with elevated RVSP and RV dilation.   Most recent RHC (8/28): RA 17 mmHg. PA 51/22/36 mmHg. PCW 25 mmHg. CO 6.69 L/min with CI 4.25 L/min/m2 (Hgb 6.8).       PAST MEDICAL & SURGICAL HISTORY:  HTN (hypertension)      HLD (hyperlipidemia)      DM (diabetes mellitus)      Hypothyroid      H/O pulmonary hypertension      CKD (chronic kidney disease)      (HFpEF) heart failure with preserved ejection fraction      S/P cataract surgery          FAMILY HISTORY:  FH: stroke (Father)        SOCIAL HISTORY:  Smoking:   Substance Use:   EtOH Use:   Advance Directives:     MEDICATIONS  (STANDING):  atorvastatin 40 milliGRAM(s) Oral at bedtime  buMETAnide Infusion 2 mG/Hr (10 mL/Hr) IV Continuous <Continuous>  dextrose 5%. 1000 milliLiter(s) (100 mL/Hr) IV Continuous <Continuous>  dextrose 5%. 1000 milliLiter(s) (50 mL/Hr) IV Continuous <Continuous>  dextrose 50% Injectable 25 Gram(s) IV Push once  dextrose 50% Injectable 12.5 Gram(s) IV Push once  dextrose 50% Injectable 25 Gram(s) IV Push once  glucagon  Injectable 1 milliGRAM(s) IntraMuscular once  heparin   Injectable 5000 Unit(s) SubCutaneous every 8 hours  hydrALAZINE 100 milliGRAM(s) Oral three times a day  insulin lispro (ADMELOG) corrective regimen sliding scale   SubCutaneous three times a day before meals  insulin lispro (ADMELOG) corrective regimen sliding scale   SubCutaneous at bedtime  isosorbide   dinitrate Tablet (ISORDIL) 10 milliGRAM(s) Oral three times a day  levothyroxine 125 MICROGram(s) Oral daily  NIFEdipine XL 60 milliGRAM(s) Oral daily  pantoprazole    Tablet 40 milliGRAM(s) Oral before breakfast  sodium bicarbonate 650 milliGRAM(s) Oral three times a day    MEDICATIONS  (PRN):  dextrose Oral Gel 15 Gram(s) Oral once PRN Blood Glucose LESS THAN 70 milliGRAM(s)/deciliter  melatonin 3 milliGRAM(s) Oral at bedtime PRN Insomnia      Allergies    No Known Allergies    Intolerances        REVIEW OF SYSTEMS:  CONSTITUTIONAL: No weakness, fevers or chills  EYES/ENT: No visual changes;  No vertigo or throat pain   NECK: No pain or stiffness  RESPIRATORY: No cough, wheezing, hemoptysis; No shortness of breath  CARDIOVASCULAR: No chest pain or palpitations  GASTROINTESTINAL: No abdominal or epigastric pain. No nausea, vomiting, or hematemesis; No diarrhea or constipation. No melena or hematochezia.  GENITOURINARY: No dysuria, frequency or hematuria  NEUROLOGICAL: No numbness or weakness  SKIN: No itching, rashes  [ ] All other systems negative  [ ] Unable to assess ROS because ________    OBJECTIVE:  ICU Vital Signs Last 24 Hrs  T(C): --  T(F): --  HR: 61 (22 Sep 2023 09:24) (60 - 62)  BP: 133/105 (22 Sep 2023 09:00) (105/62 - 149/75)  BP(mean): --  ABP: --  ABP(mean): --  RR: 18 (22 Sep 2023 09:00) (18 - 18)  SpO2: 98% (22 Sep 2023 09:24) (98% - 100%)    O2 Parameters below as of 22 Sep 2023 09:00  Patient On (Oxygen Delivery Method): nasal cannula  O2 Flow (L/min): 3            09-21 @ 07:01  -  09-22 @ 07:00  --------------------------------------------------------  IN: 0 mL / OUT: 0 mL / NET: 0 mL    09-22 @ 07:01  -  09-22 @ 11:57  --------------------------------------------------------  IN: 50 mL / OUT: 0 mL / NET: 50 mL      CAPILLARY BLOOD GLUCOSE      POCT Blood Glucose.: 196 mg/dL (22 Sep 2023 08:42)      PHYSICAL EXAM:  GENERAL: NAD, lying in bed comfortably  HEAD:  Atraumatic, normocephalic  EYES: EOMI, PERRLA, conjunctiva and sclera clear  ENT: Moist mucous membranes  NECK: Supple, no JVD  HEART: S1, S2, regular rate and rhythm, no murmurs, rubs, or gallops  LUNGS: Unlabored respirations.  Clear to auscultation bilaterally, no crackles, wheezing, or rhonchi  ABDOMEN: Soft, nontender, nondistended, +BS  EXTREMITIES: 2+ peripheral pulses bilaterally. No clubbing, cyanosis, or edema  NERVOUS SYSTEM:  A&Ox3, no focal deficits   SKIN: No rashes or lesions  LINES:     LABS:                        7.9    7.28  )-----------( 233      ( 22 Sep 2023 08:04 )             23.5     Hgb Trend: 7.9<--, 8.1<--, 8.7<--, 8.9<--  09-22    121<L>  |  85<L>  |  113<H>  ----------------------------<  173<H>  4.7   |  14<L>  |  3.89<H>    Ca    9.0      22 Sep 2023 08:04  Phos  7.5     09-22  Mg     1.90     09-22    TPro  7.9  /  Alb  3.9  /  TBili  0.4  /  DBili  x   /  AST  29  /  ALT  26  /  AlkPhos  936<H>  09-22    Creatinine Trend: 3.89<--, 3.71<--, 3.66<--, 3.76<--, 3.92<--, 3.94<--  PT/INR - ( 22 Sep 2023 08:04 )   PT: 14.9 sec;   INR: 1.34 ratio         PTT - ( 22 Sep 2023 08:04 )  PTT:52.2 sec  Urinalysis Basic - ( 22 Sep 2023 08:04 )    Color: x / Appearance: x / SG: x / pH: x  Gluc: 173 mg/dL / Ketone: x  / Bili: x / Urobili: x   Blood: x / Protein: x / Nitrite: x   Leuk Esterase: x / RBC: x / WBC x   Sq Epi: x / Non Sq Epi: x / Bacteria: x        Venous Blood Gas:  09-22 @ 08:04  7.19/36/70/14/94.8  VBG Lactate: 0.8  Venous Blood Gas:  09-22 @ 01:13  7.17/40/77/15/94.8  VBG Lactate: 1.4  Venous Blood Gas:  09-20 @ 21:42  7.20/36/48/14/76.4  VBG Lactate: 0.9  Venous Blood Gas:  09-20 @ 19:52  7.20/38/43/15/67.8  VBG Lactate: 1.1  Venous Blood Gas:  09-20 @ 17:46  7.23/34/34/14/54.4  VBG Lactate: 1.6      MICROBIOLOGY:     RADIOLOGY & ADDITIONAL TESTS:    ASSESSMENT  Ms. Hernandez is a 57 yo F w/ HTN, HLD, DM2, CKD (stage 3-4), hypothyroidism (c/b myxedema coma in past), severe pulmonary hypertension, mod-severe TR, asthma, HFpEF, presents for progressive dyspnea at rest with left sided chest tightness, found to be in ADHF, admitted to MICU for urgent HD due to acidosis and volume OL, anuric on bumex drip    PLAN  =====Neurologic=====  No active issues    =====Pulmonary=====  #Dyspnea   Plan: Acute on chronic right sided CHF vs progressive renal failure vs PE (will need to r/o with PE, but given kidney dysfunction will do dopplers of LE first to r/o DVT given high risk of ESRD with contrast).   -STAT duplex of lower extremity reordered to r/o DVT  -troponin 65->64  -pro-BNP 2092   -RVP negative   Most recent TTE (8/6) showing HFpEF with elevated RVSP and RV dilation.   -TTE ordered   - escalated to bumex drip, poor UOP  - Currently on 3L NC, kai    =====Cardiovascular=====  #Acute on chronic diastolic congestive heart failure  - Last EF 8/2023 69%  - Bumex gtt 2mg/hr->Urgent HD for volume removal due to anuria  - Strict I/O  - Daily standing weights  - Monitor lytes replete K>4.0 and Mg>2.0   - Trend SCr  - HF following, appreciate recommendations    #HTN  - Hydralazine. Isosorbide dinitrate, Nifedipine    =====GI=====  #GERD  - Protonix 40mg IV QD    #Diet  - NPO except medications    =====Renal/=====  #Electrolytes  - Maintain K>4, Phos>3, Mag>2, iCal>1    #Hyponatremia  - Pt. with hypervolemia hyponatremia.   - Last SNa low at 121.   - Pt. initiated on IV Bumex infusion for hypervolemia management->Urgent HD  - fluid restriction (<1 L/day).   - Monitor SNa q8 hrs.   - Avoid overcorrection of SNa.    #Acute renal failure superimposed on chronic kidney disease.   - Pt with CKD 3-4, baseline cr approximately 2, now with ISAMAR on CKD  - Monitor renal function/UOP, avoid nephrotoxins  - Sodium Bicarb Tabs  - urgent HD today per nephro for volume overload and anuria,     #Metabolic acidosis  Pt. with metabolic acidosis in the setting of volume overload and renal failure. pH is low at 7.19, and SCO2 decreased to 14 today.   - Plan to initiate HD, as outlined above.   - Monitor SCO2    =====Endocrine=====  #DM2  - While NPO, FSBG and ADRIAN q6h    #Hypothyroidism  - Synthroid 125 mcg daily    =====Infectious Disease=====  Pt hypothermic on oral and rectal temp (unable to obtain temp on thermometer)  - f/u blood cultures, UA    =====Heme/Onc=====  #DVT Ppx  - heparin 5000 subQ    #Anemia  - Hgb 7-8, not clinically bleeding. Likely in s/o renal disease.   - CTM, maintain Hgb >7    =====Ethics=====  FULL CODE    Attending addendum:  Agree with above. Seen and examined with team on rounds. Critically ill requiring frequent bedside visits. Acute renal failure requiring urgent HD. Supportive care. Shiley placement, electrolyte checks. Severe acidosis.   SAPNA Vilchis DO, FCCP

## 2023-09-22 NOTE — PROGRESS NOTE ADULT - SUBJECTIVE AND OBJECTIVE BOX
Patient is a 58y old  Female who presents with a chief complaint of progressive dyspnea (22 Sep 2023 11:40)    Jimmy Teran MD   [facility] Division of Hospital Medicine   Pager [phone]  Reachable on [employer] Teams     SUBJECTIVE / OVERNIGHT EVENTS:  Patient seen and examined this morning lethargic, slow to answer but responding appropriately.   States that she is in hospital for problems breathing.   Feeling more short of breath this morning, denies chest pain.     MEDICATIONS  (STANDING):  atorvastatin 40 milliGRAM(s) Oral at bedtime  buMETAnide Infusion 2 mG/Hr (10 mL/Hr) IV Continuous <Continuous>  chlorhexidine 2% Cloths 1 Application(s) Topical <User Schedule>  dextrose 5%. 1000 milliLiter(s) (100 mL/Hr) IV Continuous <Continuous>  dextrose 5%. 1000 milliLiter(s) (50 mL/Hr) IV Continuous <Continuous>  dextrose 50% Injectable 25 Gram(s) IV Push once  dextrose 50% Injectable 12.5 Gram(s) IV Push once  dextrose 50% Injectable 25 Gram(s) IV Push once  glucagon  Injectable 1 milliGRAM(s) IntraMuscular once  heparin   Injectable 5000 Unit(s) SubCutaneous every 8 hours  hydrALAZINE 100 milliGRAM(s) Oral three times a day  insulin lispro (ADMELOG) corrective regimen sliding scale   SubCutaneous at bedtime  insulin lispro (ADMELOG) corrective regimen sliding scale   SubCutaneous three times a day before meals  isosorbide   dinitrate Tablet (ISORDIL) 10 milliGRAM(s) Oral three times a day  levothyroxine 125 MICROGram(s) Oral daily  NIFEdipine XL 60 milliGRAM(s) Oral daily  pantoprazole    Tablet 40 milliGRAM(s) Oral before breakfast  sodium bicarbonate 650 milliGRAM(s) Oral three times a day    MEDICATIONS  (PRN):  dextrose Oral Gel 15 Gram(s) Oral once PRN Blood Glucose LESS THAN 70 milliGRAM(s)/deciliter  melatonin 3 milliGRAM(s) Oral at bedtime PRN Insomnia      Vital Signs Last 24 Hrs  T(C): --  T(F): --  HR: 63 (22 Sep 2023 13:07) (60 - 63)  BP: 128/73 (22 Sep 2023 12:59) (105/62 - 133/105)  BP(mean): --  RR: 17 (22 Sep 2023 12:59) (17 - 18)  SpO2: 97% (22 Sep 2023 13:07) (97% - 100%)  CAPILLARY BLOOD GLUCOSE      POCT Blood Glucose.: 224 mg/dL (22 Sep 2023 12:48)  POCT Blood Glucose.: 196 mg/dL (22 Sep 2023 08:42)  POCT Blood Glucose.: 255 mg/dL (21 Sep 2023 21:50)  POCT Blood Glucose.: 276 mg/dL (21 Sep 2023 16:58)    I&O's Summary    21 Sep 2023 07:01  -  22 Sep 2023 07:00  --------------------------------------------------------  IN: 0 mL / OUT: 0 mL / NET: 0 mL    22 Sep 2023 07:01  -  22 Sep 2023 14:12  --------------------------------------------------------  IN: 50 mL / OUT: 0 mL / NET: 50 mL      General: woman sitting up in bed appears comfortable in NAD, awake and alert  Eyes: conjunctiva clear, nonicteric sclera  Neck: +JVD, supple   Respiratory: No respiratory distress, CTABL, No rales, rhonchi, wheezing.  Cardiovascular: S1,S2; Regular rate and rhythm; No m/g/r. 2+ peripheral pulses in BUEs.   Gastrointestinal: Soft, Nontender, Nondistended; +BS.   Extremities: B/l 2+ edema. No c/c; warm to touch  Psych:  appropriate mood and affect    LABS:                        7.9    7.28  )-----------( 233      ( 22 Sep 2023 08:04 )             23.5     09-22    121<L>  |  85<L>  |  113<H>  ----------------------------<  173<H>  4.7   |  14<L>  |  3.89<H>    Ca    9.0      22 Sep 2023 08:04  Phos  7.5     09-22  Mg     1.90     09-22    TPro  7.9  /  Alb  3.9  /  TBili  0.4  /  DBili  x   /  AST  29  /  ALT  26  /  AlkPhos  936<H>  09-22    PT/INR - ( 22 Sep 2023 08:04 )   PT: 14.9 sec;   INR: 1.34 ratio         PTT - ( 22 Sep 2023 08:04 )  PTT:52.2 sec  CARDIAC MARKERS ( 22 Sep 2023 01:13 )  x     / x     / 77 U/L / x     / 18.3 ng/mL      Urinalysis Basic - ( 22 Sep 2023 08:04 )    Color: x / Appearance: x / SG: x / pH: x  Gluc: 173 mg/dL / Ketone: x  / Bili: x / Urobili: x   Blood: x / Protein: x / Nitrite: x   Leuk Esterase: x / RBC: x / WBC x   Sq Epi: x / Non Sq Epi: x / Bacteria: x        RADIOLOGY & ADDITIONAL TESTS:    Imaging Personally Reviewed:    Consultant(s) Notes Reviewed:      Care Discussed with Consultants/Other Providers:

## 2023-09-22 NOTE — CONSULT NOTE ADULT - SUBJECTIVE AND OBJECTIVE BOX
Asael Lam MD  Cardiology Fellow  [phone]  All Cardiology service information can be found 24/7 on [email or web address], password: [email or web address]    Patient seen and evaluated at bedside    Chief Complaint:    HPI:  Ms. Hernandez is a 57 yo F w/ HTN, HLD, DM2, CKD (stage 3-4), hypothyroidism (c/b myxedema coma in past), severe pulmonary hypertension, mod-severe TR, asthma, HFpEF, presents for progressive dyspnea, placed initially on bipap. Attempted diuresis on the floor with bumex gtt but patient with progressive anuric ISAMAR on CKD, with worsening hyponatremia (likely hypervolemic).     Most recent TTE showing HFpEF with elevated RVSP and RV dilation.     Most recent RHC: RA 17 mmHg. PA 51/22/36 mmHg. PCW 25 mmHg. CO 6.69 L/min with CI 4.25 L/min/m2 (Hgb 6.8).       PMHx:   Benign essential HTN    HTN (hypertension)    HLD (hyperlipidemia)    DM (diabetes mellitus)    Hypothyroid    H/O pulmonary hypertension    CKD (chronic kidney disease)    (HFpEF) heart failure with preserved ejection fraction        PSHx:   No significant past surgical history    S/P cataract surgery      Allergies:  No Known Allergies    Current Medications:   atorvastatin 40 milliGRAM(s) Oral at bedtime  buMETAnide Infusion 2 mG/Hr IV Continuous <Continuous>  dextrose 5%. 1000 milliLiter(s) IV Continuous <Continuous>  dextrose 5%. 1000 milliLiter(s) IV Continuous <Continuous>  dextrose 50% Injectable 12.5 Gram(s) IV Push once  dextrose 50% Injectable 25 Gram(s) IV Push once  dextrose 50% Injectable 25 Gram(s) IV Push once  dextrose Oral Gel 15 Gram(s) Oral once PRN  glucagon  Injectable 1 milliGRAM(s) IntraMuscular once  heparin   Injectable 5000 Unit(s) SubCutaneous every 8 hours  hydrALAZINE 100 milliGRAM(s) Oral three times a day  insulin lispro (ADMELOG) corrective regimen sliding scale   SubCutaneous three times a day before meals  insulin lispro (ADMELOG) corrective regimen sliding scale   SubCutaneous at bedtime  isosorbide   dinitrate Tablet (ISORDIL) 10 milliGRAM(s) Oral three times a day  levothyroxine 125 MICROGram(s) Oral daily  melatonin 3 milliGRAM(s) Oral at bedtime PRN  NIFEdipine XL 60 milliGRAM(s) Oral daily  pantoprazole    Tablet 40 milliGRAM(s) Oral before breakfast  sodium bicarbonate 650 milliGRAM(s) Oral three times a day      FAMILY HISTORY:  FH: stroke (Father)    REVIEW OF SYSTEMS:  All other review of systems is negative unless indicated above.    Physical Exam:  T(F): --  HR: 61 (09-22) (60 - 62)  BP: 133/105 (09-22) (105/62 - 149/75)  RR: 18 (09-22)  SpO2: 98% (09-22)  HEAD:  nontraumatic  ENT: EOMI, PERRL  CHEST/LUNG: Crackles b/l  HEART: Regular rate and rhythm  ABDOMEN: Soft, Nondistended    CXR: Personally reviewed    Labs: Personally reviewed                        7.9    7.28  )-----------( 233      ( 22 Sep 2023 08:04 )             23.5     09-22    121<L>  |  85<L>  |  113<H>  ----------------------------<  173<H>  4.7   |  14<L>  |  3.89<H>    Ca    9.0      22 Sep 2023 08:04  Phos  7.5     09-22  Mg     1.90     09-22    TPro  7.9  /  Alb  3.9  /  TBili  0.4  /  DBili  x   /  AST  29  /  ALT  26  /  AlkPhos  936<H>  09-22    PT/INR - ( 22 Sep 2023 08:04 )   PT: 14.9 sec;   INR: 1.34 ratio         PTT - ( 22 Sep 2023 08:04 )  PTT:52.2 sec    CARDIAC MARKERS ( 22 Sep 2023 01:13 )  56 ng/L / x     / x     / 77 U/L / x     / 18.3 ng/mL  CARDIAC MARKERS ( 20 Sep 2023 19:52 )  64 ng/L / x     / x     / x     / x     / x      CARDIAC MARKERS ( 20 Sep 2023 17:46 )  65 ng/L / x     / x     / x     / x     / x                  Thyroid Stimulating Hormone, Serum: 4.58 uIU/mL (09-22 @ 08:04)

## 2023-09-22 NOTE — PROGRESS NOTE ADULT - SUBJECTIVE AND OBJECTIVE BOX
Interval History:  Patient resting comfortably in bed   Denies CP/SOB/palpitations/dizziness  No acute events overnight    Medications:  atorvastatin 40 milliGRAM(s) Oral at bedtime  buMETAnide Infusion 2 mG/Hr IV Continuous <Continuous>  dextrose 5%. 1000 milliLiter(s) IV Continuous <Continuous>  dextrose 5%. 1000 milliLiter(s) IV Continuous <Continuous>  dextrose 50% Injectable 25 Gram(s) IV Push once  dextrose 50% Injectable 25 Gram(s) IV Push once  dextrose 50% Injectable 12.5 Gram(s) IV Push once  dextrose Oral Gel 15 Gram(s) Oral once PRN  glucagon  Injectable 1 milliGRAM(s) IntraMuscular once  heparin   Injectable 5000 Unit(s) SubCutaneous every 8 hours  hydrALAZINE 100 milliGRAM(s) Oral three times a day  insulin lispro (ADMELOG) corrective regimen sliding scale   SubCutaneous at bedtime  insulin lispro (ADMELOG) corrective regimen sliding scale   SubCutaneous three times a day before meals  isosorbide   dinitrate Tablet (ISORDIL) 10 milliGRAM(s) Oral three times a day  levothyroxine 125 MICROGram(s) Oral daily  melatonin 3 milliGRAM(s) Oral at bedtime PRN  NIFEdipine XL 60 milliGRAM(s) Oral daily  pantoprazole    Tablet 40 milliGRAM(s) Oral before breakfast  sodium bicarbonate 650 milliGRAM(s) Oral three times a day      Vitals:  T(C): --  HR: 61 (09-22-23 @ 09:24) (60 - 62)  BP: 127/66 (09-22-23 @ 05:00) (105/62 - 149/75)  BP(mean): --  RR: 18 (09-22-23 @ 05:00) (18 - 18)  SpO2: 98% (09-22-23 @ 09:24) (98% - 100%)    Daily     Daily         I&O's Summary    21 Sep 2023 07:01  -  22 Sep 2023 07:00  --------------------------------------------------------  IN: 0 mL / OUT: 0 mL / NET: 0 mL        Physical Exam:  Appearance: No Acute Distress  HEENT: PERRL  Neck: No JVD  Cardiovascular: Normal S1 S2, No murmurs/rubs/gallops  Respiratory: Clear to auscultation bilaterally  Gastrointestinal: Soft, Non-tender	  Skin: No cyanosis	  Neurologic: Non-focal  Extremities: No LE edema  Psychiatry: A & O x 3, Mood & affect appropriate    Labs:                        7.9    7.28  )-----------( 233      ( 22 Sep 2023 08:04 )             23.5     09-22    121<L>  |  85<L>  |  113<H>  ----------------------------<  173<H>  4.7   |  14<L>  |  3.89<H>    Ca    9.0      22 Sep 2023 08:04  Phos  7.5     09-22  Mg     1.90     09-22    TPro  7.9  /  Alb  3.9  /  TBili  0.4  /  DBili  x   /  AST  29  /  ALT  26  /  AlkPhos  936<H>  09-22    PT/INR - ( 22 Sep 2023 08:04 )   PT: 14.9 sec;   INR: 1.34 ratio         PTT - ( 22 Sep 2023 08:04 )  PTT:52.2 sec  CARDIAC MARKERS ( 22 Sep 2023 01:13 )  x     / x     / 77 U/L / x     / 18.3 ng/mL        TELEMETRY:    Echocardiogram:   Interval History:  Patient resting comfortably in bed   Denies CP/SOB/palpitations/dizziness  No acute events overnight    Medications:  atorvastatin 40 milliGRAM(s) Oral at bedtime  buMETAnide Infusion 2 mG/Hr IV Continuous <Continuous>  dextrose 5%. 1000 milliLiter(s) IV Continuous <Continuous>  dextrose 5%. 1000 milliLiter(s) IV Continuous <Continuous>  dextrose 50% Injectable 25 Gram(s) IV Push once  dextrose 50% Injectable 25 Gram(s) IV Push once  dextrose 50% Injectable 12.5 Gram(s) IV Push once  dextrose Oral Gel 15 Gram(s) Oral once PRN  glucagon  Injectable 1 milliGRAM(s) IntraMuscular once  heparin   Injectable 5000 Unit(s) SubCutaneous every 8 hours  hydrALAZINE 100 milliGRAM(s) Oral three times a day  insulin lispro (ADMELOG) corrective regimen sliding scale   SubCutaneous at bedtime  insulin lispro (ADMELOG) corrective regimen sliding scale   SubCutaneous three times a day before meals  isosorbide   dinitrate Tablet (ISORDIL) 10 milliGRAM(s) Oral three times a day  levothyroxine 125 MICROGram(s) Oral daily  melatonin 3 milliGRAM(s) Oral at bedtime PRN  NIFEdipine XL 60 milliGRAM(s) Oral daily  pantoprazole    Tablet 40 milliGRAM(s) Oral before breakfast  sodium bicarbonate 650 milliGRAM(s) Oral three times a day      Vitals:  T(C): --  HR: 61 (09-22-23 @ 09:24) (60 - 62)  BP: 127/66 (09-22-23 @ 05:00) (105/62 - 149/75)  BP(mean): --  RR: 18 (09-22-23 @ 05:00) (18 - 18)  SpO2: 98% (09-22-23 @ 09:24) (98% - 100%)    Daily     Daily         I&O's Summary    21 Sep 2023 07:01  -  22 Sep 2023 07:00  --------------------------------------------------------  IN: 0 mL / OUT: 0 mL / NET: 0 mL        Physical Exam:  Appearance: No Acute Distress  Neck:  Cardiovascular: Normal S1 S2  Respiratory: Clear to auscultation bilaterally  Gastrointestinal: Soft, Non-tender	  Skin: No cyanosis	  Neurologic: Non-focal  Extremities: No LE edema  Psychiatry: A & O x 3, Mood & affect appropriate    Labs:                        7.9    7.28  )-----------( 233      ( 22 Sep 2023 08:04 )             23.5     09-22    121<L>  |  85<L>  |  113<H>  ----------------------------<  173<H>  4.7   |  14<L>  |  3.89<H>    Ca    9.0      22 Sep 2023 08:04  Phos  7.5     09-22  Mg     1.90     09-22    TPro  7.9  /  Alb  3.9  /  TBili  0.4  /  DBili  x   /  AST  29  /  ALT  26  /  AlkPhos  936<H>  09-22    PT/INR - ( 22 Sep 2023 08:04 )   PT: 14.9 sec;   INR: 1.34 ratio         PTT - ( 22 Sep 2023 08:04 )  PTT:52.2 sec  CARDIAC MARKERS ( 22 Sep 2023 01:13 )  x     / x     / 77 U/L / x     / 18.3 ng/mL        TELEMETRY: Sinus Rhythm     Echocardiogram:  Transthoracic Echocardiogram (08.06.23 @ 12:30)     DIMENSIONS:  Dimensions:     Normal Values:  LA:     3.6 cm    2.0 - 4.0 cm  Ao:     2.2 cm    2.0 - 3.8 cm  SEPTUM: 0.7 cm    0.6 - 1.2 cm  PWT:    0.9 cm    0.6 - 1.1 cm  LVIDd:  4.6 cm    3.0 - 5.6 cm  LVIDs:  2.6 cm    1.8 - 4.0 cm  Derived Variables:  LVMI: 72 g/m2  RWT: 0.39  Fractional short: 43 %  Ejection Fraction (Modified Macedo Rule): 69 %  ------------------------------------------------------------------------  OBSERVATIONS:  Mitral Valve: Normal mitral valve. Minimal mitral  regurgitation.  Aortic Root: Normal aortic root.  Aortic Valve: Normal trileaflet aortic valve.  Left Atrium: Moderately dilated left atrium.  LA volume  index = 45 cc/m2.  Left Ventricle: Normal left ventricular systolic function.  No segmental wall motion abnormalities. Normal left  ventricular internal dimensions and wall thicknesses.  (DT:152 ms).  Right Heart: Normal right atrium. Right ventricular  enlargement with normal right ventricular systolic  function. Normal tricuspid valve.  Moderate-severe  tricuspid regurgitation. Normal pulmonic valve. Moderate  pulmonic regurgitation.  Pericardium/PleuraNormal pericardium with trace pericardial  effusion. Right pleural effusion.  Hemodynamic: Estimated right ventricular systolic pressure  equals 65 mm Hg, assuming right atrial pressure equals 10  mm Hg, consistent with severe pulmonary hypertension.  ------------------------------------------------------------------------  CONCLUSIONS:  1. Normal left ventricular systolic function. No segmental  wall motion abnormalities.  2. Right ventricular enlargement with normal right  ventricular systolic function.  3. Normal tricuspid valve.Moderate-severe tricuspid  regurgitation.  4. Estimated pulmonary artery systolic pressure equals 65  mm Hg, assuming right atrial pressure equals 10  mm Hg,  consistent with severe pulmonary hypertension.  *** Compared with echocardiogram of 2/27/2023, no  significant changes noted.

## 2023-09-22 NOTE — PROGRESS NOTE ADULT - PROBLEM SELECTOR PLAN 2
Acute on chronic right sided CHF vs progressive renal failure. PE lower on differential   -dimer elevated 3500s up from prior baseline of 300s   -duplex of lower extremity, V/Q pending   -troponin 65->64  -pro-BNP 2092   -RVP negative     # Acute hypoxemic respiratory failure   - s/p BIPAP in the ED, transitioned to high flow, now on NC

## 2023-09-22 NOTE — PROGRESS NOTE ADULT - PROBLEM SELECTOR PLAN 1
Pt. with ISAMAR on CKD in setting of HF and fluid overload. On review of labs on [place], Scr elevated at 3.31 on 8/30/23, increased to 3.92 on 9/20/23. Scr elevated/stable at 3.76 today. Pt. initiated on IV Bumex infusion for hypervolemia management. Today, pt is anuric and Scr increased to 3.89. Pt will need urgent HD. Risk and benefit was explained to the pt. Consent form was obtained and placed in chart. Plan for HD today awaiting dialysis catheter placement. Monitor labs and urine output. Avoid any potential nephrotoxins. Dose medications as per eGFR. Pt. with ISAMAR on CKD in setting of HF and fluid overload. On review of labs on [place], Scr elevated at 3.31 on 8/30/23, increased to 3.92 on admission (9/20). Pt. initiated on IV Bumex infusion on 9/21. However, pt. is anuric despite increase in IV Bumex infusion rate. Scr remains elevated at 3.89 today. Labs reviewed. Given volume overload and anuria, plan to initiate HD today pending HD catheter placement. HD consent was obtained at bedside and placed in chart. Monitor labs and urine output. Avoid any potential nephrotoxins. Dose medications as per eGFR. Pt. with oliguric ISAMAR on CKD in setting of HF and fluid overload. On review of labs on [place], Scr elevated at 3.31 on 8/30/23, increased to 3.92 on admission (9/20). Pt. initiated on IV Bumex infusion on 9/21. However, pt. is anuric despite increase in IV Bumex infusion rate. Scr remains elevated at 3.89 today. Labs reviewed. Given volume overload and anuria, plan to initiate HD today following HD catheter placement. HD consent was obtained at bedside and placed in chart. Monitor labs and urine output. Avoid any potential nephrotoxins. Dose medications as per eGFR.

## 2023-09-22 NOTE — DIETITIAN INITIAL EVALUATION ADULT - PROBLEM SELECTOR PLAN 10
-No abdominal pain, but significant distention on exam.   -ABD US ordered and lipase ordered for AM  -consider CT abdomen and pelvis if above workup not definitive

## 2023-09-22 NOTE — PROGRESS NOTE ADULT - ATTENDING COMMENTS
Pt. with ISAMAR on CKD, hyponatremia and fluid overload. Scr elevated at 3.89 and SNa low at 121 today. Assessment and plan for ISAMAR on CKD, hyponatremia and metabolic acidosis as outlined above. Pt. anuric despite IV diuretic infusion. Plan to initiate HD today following HD catheter placement. Monitor labs and urine output. Avoid any potential nephrotoxins. Dose medications as per eGFR.

## 2023-09-22 NOTE — PROVIDER CONTACT NOTE (CRITICAL VALUE NOTIFICATION) - ACTION/TREATMENT ORDERED:
ACP notified. No interventions made at this time, will continue to monitor pt
ACP notified. No interventions made at this time, will continue to monitor pt

## 2023-09-22 NOTE — CONSULT NOTE ADULT - ASSESSMENT
Ms. Hernandez is a 59 yo F w/ HTN, HLD, DM2, CKD (stage 3-4), hypothyroidism (c/b myxedema coma in past), severe pulmonary hypertension, mod-severe TR, asthma, HFpEF, here for hypoxic respiratory failure in the setting of HFpEF exacerbation, now with anuric renal failure. Patient will need a shiley placed for urgent HD with UF.     Plan:  -Recommend admission to ICU level care for urgent UF  -Discussed with HF attending and MICU team, patient to be admitted to MICU service

## 2023-09-22 NOTE — PROGRESS NOTE ADULT - PROBLEM SELECTOR PLAN 1
#Acute CHFpEF   - TTE 8/6/23 with LVEF 69%, mod-sev TR, severe pulmonary HTN, BNP 6228  - at home on bumex 2 mg BID, on Bumex gtt overnight with no UOP   - RHC 8/28, elevated PCWP suggestive of group 2 PHT  - TTE pending   - HF consult, CCU, MICU consults appreciated, plan for transfer to the MICU today for urgent dialysis

## 2023-09-22 NOTE — PROGRESS NOTE ADULT - PROBLEM SELECTOR PLAN 1
Bumex 2mg/hr    Hydralazine 100mg TID (hold SBP<90)  ISDN 10mg TID (hold SBP<90)  Strict I/O  Daily standing weights  Monitor lytes replete K>4.0 and Mg>2.0   Trend SCr  Please order thyroid function in AM  Management per primary team  Appreciate Nephrology recommendations (emergency HD)   Pending final recommendations from HF attending.

## 2023-09-22 NOTE — PROVIDER CONTACT NOTE (CRITICAL VALUE NOTIFICATION) - ASSESSMENT
Patient is AOx4, On 3L NC, pulse ox is 100. Patient denies chest pain.
Patient is AOx4, On 3L NC, pulse ox is 100. Patient denies chest pain.

## 2023-09-22 NOTE — PROGRESS NOTE ADULT - PROBLEM SELECTOR PLAN 3
# Metabolic acidosis   CKD4, baseline cr approximately 3, now with ISAMAR on CKD  - renal function and acidosis worsening, no UOP despite bumex gtt  - renal consulted appreciate plan for urgent dialsis   - d/w MICU plan for shiley placement in MICU

## 2023-09-22 NOTE — CHART NOTE - NSCHARTNOTEFT_GEN_A_CORE
Attempted to visit patient twice. Unable to see patient, patient was transferred to MICU. Will reattempt at a later date.

## 2023-09-22 NOTE — PROGRESS NOTE ADULT - PROBLEM SELECTOR PLAN 2
Pt. with hypervolemia hyponatremia. Last SNa low at 121. Pt. initiated on IV Bumex infusion for hypervolemia management. Scr remains 121 today. However, pt is anuric at this time. Will require HD. Plan for HD today awaiting dialysis catheter placement. Recommend fluid restriction (<1 L/day). Monitor SNa q8 hrs. Avoid overcorrection of SNa. Pt. with hypervolemic hyponatremia. SNa remains low at 121 today. Plan to initiate HD with UF, as outlined above. Monitor SNa.

## 2023-09-22 NOTE — PROGRESS NOTE ADULT - ASSESSMENT
57 yo F w/ HTN, HLD, DM2, CKD (stage 3-4), hypothyroidism (c/b myxedema coma in past), severe pulmonary hypertension, mod-severe TR, asthma, HFpEF, presents for progressive dyspnea admitted for acute CHFpEF and ISAMAR on CKD, now requiring urgent dialysis and MICU transfer.  17-Jan-2023

## 2023-09-22 NOTE — PROGRESS NOTE ADULT - ASSESSMENT
58 year old Female with PMH of  HTN, HLD, DM2, asthma, CKD (baseline SCr ~2.0), hypothyroidism c/b myxedema coma in past), anemia requiring recent transfusions (followed by GI with plan for scope), and HFpEF with severe pulmonary hypertension (EF 64%;LVIDd 4.6 RVE with normal RV function, mod-severe TR and severe PH RVSP~65). Patient presented with c/o progressive worsening SOB/MARKS/PND and 3 pillow orthopnea X 5days, she reports compliance with medication/diet; treated in the ED with IV Lasix 40mg followed by IV Bumex 2mg BID.        Pertinent Labs: BNP  1819     Lactate 1.0    Troponin levels    64/65    BUN/Cr  110/3.92   Na 122    TSH: T3/T4  Pending             K/L  (1/2023)  8.9/3.75 ratio 2.35  CXR: Pulmonary edema with small bilateral pleural effusions  EKG:  Normal sinus rhythm, possible Left atrial enlargement  TTE: Normal LV systolic function, Right ventricular enlargement with normal RV systolic function, Moderate-severe TR and PASP equals 65 consistent with PH.  RHC (8/23):  RA 17 PA 51/22/36  PCWP  25  CO/CI 6.7/4.2     and  PVR 2.6 POWERS       Home Meds:   Bumex 2mg BID    Hydralazine 100mg TID   ISDN 10mg TID  Procardia XL 60mg daily

## 2023-09-22 NOTE — PROGRESS NOTE ADULT - PROBLEM SELECTOR PLAN 3
Pt. with metabolic acidosis in the setting of volume overload and renal failure. pH is low at 7.19, and SCO2 decreased to 14 today. Plan to initiate HD, as outlined above. Monitor SCO2.    If you have any questions, please feel free to contact me  Cody Mcguire  Nephrology Fellow  [phone] / [employer] Teams(Preferred)  (After 5pm or on weekends please page the on-call fellow) Pt. with metabolic acidosis in the setting of advanced kidney failure. pH is low at 7.19, and SCO2 decreased to 14 today. Plan to initiate HD, as outlined above. Monitor SCO2.    If you have any questions, please feel free to contact me  Cody Mcguire  Nephrology Fellow  [phone] / [employer] Teams(Preferred)  (After 5pm or on weekends please page the on-call fellow)

## 2023-09-22 NOTE — CHART NOTE - NSCHARTNOTEFT_GEN_A_CORE
[employer] ACP COVERAGE:    Called by RN that pt c/o worsening SOB upon arrival to 5N from ED. Pt was a/w dyspnea, s/p BIPAP->HFNC in ED, now transitioned to 3L NC since 13:00 today.  Pt seen and evaluated at bedside [employer] ACP COVERAGE:    Called by RN that pt c/o worsening SOB upon arrival to 5N from ED. Pt was a/w dyspnea, s/p BIPAP->HFNC in ED, now transitioned to 3L NC since 13:00 today. Pt also had D-dimer done on admission noted to be 3509.   Pt seen and evaluated at bedside. Pt noted to be sitting upright in bed, states that she cannot lay flat due to her SOB.     Exam: Pt does not appear tachypneic or with any increased WOB that would require BiPAP at this time. Pt able to converse in full sentences while sitting upright.   Cards: RRR, Lungs: Diminished lung fields b/l [employer] ACP COVERAGE:    Called by RN that pt c/o worsening SOB upon arrival to 5N from ED. Pt was a/w dyspnea, s/p BIPAP->HFNC in ED, now transitioned to 3L NC since 13:00 today. Pt also had D-dimer done on admission noted to be 3509. Differential includes PE, pt was ordered for VQ scan (not CTA 2/2 CKD) and B/L LE duplex however pt refused earlier today.  Pt seen and evaluated at bedside. Pt noted to be sitting upright in bed, states that she cannot lay flat due to her SOB.     Exam: Pt does not appear tachypneic or with any increased WOB that would require BiPAP at this time. Pt able to converse in full sentences while sitting upright.   Cards: RRR, Lungs: Diminished lung fields b/l    Case discussed with [facility] [id number] Dr. Pearce:  -rec follow up with renal [employer] ACP COVERAGE:    Called by RN that pt c/o worsening SOB upon arrival to 5N from ED. Pt was a/w dyspnea, s/p BIPAP->HFNC in ED, now transitioned to 3L NC since 13:00 today. Pt is on a bumex gtt, no I/O documented in ED, on verbal report 5N RN was told that pt had been urinating, unsure of amount. Since pt has been on 5N (approx 5 hours), has only urinated 75cc total. Pt also had D-dimer done on admission noted to be 3509. Differential includes PE, pt was ordered for VQ scan (not CTA 2/2 CKD) and B/L LE duplex however pt refused earlier today.    Pt seen and evaluated at bedside. Pt noted to be sitting upright in bed, states that she cannot lay flat due to her SOB.  Exam: Pt does not appear tachypneic or with any increased WOB that would require BiPAP at this time. Pt able to converse in full sentences while sitting upright.   Cards: RRR, Lungs: Diminished lung fields b/l    -Repeat labs (CBC/BMP w/mg&phos/CE/VBG w/lactate) stat sent and reviewed   -Strict I/O monitoring   -Bladder scan -0cc, not retaining     [ ] Discussed case with [facility] [id number] Dr. Pearce and possibly starting hep gtt empirically given concern for PE. Pt not tachycardic at this time and O2 sat stable on 3L. Will hold off on AC for now in the event that pt needs access for HD or until hemodynamically decompensates.   [ ] Discussed labs/case with nephro fellow on call - Advised to increase bumex gtt to 2mg/hr, start sodium bicarb tabs TID, place faye for strict monitoring. Discussed concern for possible new dialysis given low UOP, however as pt's Cr has remained relatively the same, no need for urgent HD tonight.   [ ] If patient's clinical status changes or continues to have minimal UOP- will need CCU eval.   [ ] Discussed need for VQ scan and LE dopplers with pt, she is now agreeable to do in AM  [ ] TTE ordered   [ ] VS q4 hrs,  [employer] ACP COVERAGE:    Called by RN that pt c/o worsening SOB upon arrival to 5N from ED. Pt was a/w dyspnea, s/p BIPAP->HFNC in ED, now transitioned to 3L NC since 13:00 today. Pt is on a bumex gtt, no I/O documented in ED, on verbal report 5N RN was told that pt had been urinating, unsure of amount. Since pt has been on 5N (approx 5 hours), has only urinated 75cc total. Pt also had D-dimer done on admission noted to be 3509. Differential includes PE, pt was ordered for VQ scan (not CTA 2/2 CKD) and B/L LE duplex however pt refused earlier today.    Pt seen and evaluated at bedside. Pt noted to be sitting upright in bed, states that she cannot lay flat due to her SOB.  Exam: Pt does not appear tachypneic or with any increased WOB that would require BiPAP at this time. Pt able to converse in full sentences while sitting upright.   Cards: RRR, Lungs: Diminished lung fields b/l    -Repeat labs (CBC/BMP w/mg&phos/CE/VBG w/lactate) stat sent and reviewed   -Strict I/O monitoring   -Bladder scan -0cc, not retaining     [ ] Discussed case with [facility] [id number] Dr. Pearce and possibly starting hep gtt empirically given concern for PE. Pt not tachycardic at this time and O2 sat stable on 3L. Will hold off on AC for now in the event that pt needs access for HD or if hemodynamically decompensates.   [ ] Discussed labs/case with nephro fellow on call - Advised to increase bumex gtt to 2mg/hr, start sodium bicarb tabs TID, place faye for strict monitoring. Discussed concern for possible new dialysis given low UOP, however as pt's Cr has remained relatively the same, no need for urgent HD tonight.   [ ] If patient's clinical status changes or continues to have minimal UOP- will need CCU eval.   [ ] Discussed need for VQ scan and LE dopplers with pt, she is now agreeable to do in AM  [ ] TTE ordered   [ ] VS q4 hrs,

## 2023-09-22 NOTE — PROGRESS NOTE ADULT - SUBJECTIVE AND OBJECTIVE BOX
[facility] Division of Kidney Diseases & Hypertension  FOLLOW UP NOTE  [phone]--------------------------------------------------------------------------------  Chief Complaint:Heart failure  24 hour events/subjective:  no acute overnight events, pt. seen and examined at bedside. Pt states that she is still feeling SOB on exertion. She was scheduled for a doppler study but felt dyspnea and SOB and was unable to perform the test. Pt is anuric at this time despite being on bumex drip.         PAST HISTORY  --------------------------------------------------------------------------------  No significant changes to PMH, PSH, FHx, SHx, unless otherwise noted    ALLERGIES & MEDICATIONS  --------------------------------------------------------------------------------  Allergies    No Known Allergies    Intolerances      Standing Inpatient Medications  atorvastatin 40 milliGRAM(s) Oral at bedtime  buMETAnide Infusion 2 mG/Hr IV Continuous <Continuous>  dextrose 5%. 1000 milliLiter(s) IV Continuous <Continuous>  dextrose 5%. 1000 milliLiter(s) IV Continuous <Continuous>  dextrose 50% Injectable 12.5 Gram(s) IV Push once  dextrose 50% Injectable 25 Gram(s) IV Push once  dextrose 50% Injectable 25 Gram(s) IV Push once  glucagon  Injectable 1 milliGRAM(s) IntraMuscular once  heparin   Injectable 5000 Unit(s) SubCutaneous every 8 hours  hydrALAZINE 100 milliGRAM(s) Oral three times a day  insulin lispro (ADMELOG) corrective regimen sliding scale   SubCutaneous three times a day before meals  insulin lispro (ADMELOG) corrective regimen sliding scale   SubCutaneous at bedtime  isosorbide   dinitrate Tablet (ISORDIL) 10 milliGRAM(s) Oral three times a day  levothyroxine 125 MICROGram(s) Oral daily  NIFEdipine XL 60 milliGRAM(s) Oral daily  pantoprazole    Tablet 40 milliGRAM(s) Oral before breakfast  sodium bicarbonate 650 milliGRAM(s) Oral three times a day    PRN Inpatient Medications  dextrose Oral Gel 15 Gram(s) Oral once PRN  melatonin 3 milliGRAM(s) Oral at bedtime PRN      REVIEW OF SYSTEMS  --------------------------------------------------------------------------------  Gen: No fevers/chills, + gen weakness  Skin: No rashes  Head/Eyes/Ears: No HA   Respiratory: + dyspnea, orthopnea, dyspnea on exertion  CV: See HPI  GI: No abdominal pain, diarrhea  : No dysuria, hematuria  MSK: + b/l LE edema  Heme: No easy bruising or bleeding  Psych: No significant depression      All other systems were reviewed and are negative, except as noted.    VITALS/PHYSICAL EXAM  --------------------------------------------------------------------------------  T(C): --  HR: 61 (09-22-23 @ 09:24) (60 - 62)  BP: 133/105 (09-22-23 @ 09:00) (105/62 - 149/75)  RR: 18 (09-22-23 @ 09:00) (18 - 18)  SpO2: 98% (09-22-23 @ 09:24) (98% - 100%)  Wt(kg): --  Height (cm): 157.5 (09-20-23 @ 15:40)      09-21-23 @ 07:01  -  09-22-23 @ 07:00  --------------------------------------------------------  IN: 0 mL / OUT: 0 mL / NET: 0 mL    09-22-23 @ 07:01  -  09-22-23 @ 10:21  --------------------------------------------------------  IN: 50 mL / OUT: 0 mL / NET: 50 mL      Physical Exam:  Gen: resting, NAD   HEENT: breathing via NC  Pulm: B/L crackles at the bases, on 2 LNC  CV: S1S2+  Abd: Soft, +BS   Ext: LE pitting edema B/L up to the knee  Neuro: Awake  Skin: Warm and dry    LABS/STUDIES  --------------------------------------------------------------------------------              7.9    7.28  >-----------<  233      [09-22-23 @ 08:04]              23.5     121  |  85  |  113  ----------------------------<  173      [09-22-23 @ 08:04]  4.7   |  14  |  3.89        Ca     9.0     [09-22-23 @ 08:04]      Mg     1.90     [09-22-23 @ 08:04]      Phos  7.5     [09-22-23 @ 08:04]    TPro  7.9  /  Alb  3.9  /  TBili  0.4  /  DBili  x   /  AST  29  /  ALT  26  /  AlkPhos  936  [09-22-23 @ 08:04]    PT/INR: PT 14.9 , INR 1.34       [09-22-23 @ 08:04]  PTT: 52.2       [09-22-23 @ 08:04]    CK 77      [09-22-23 @ 01:13]    Creatinine Trend:  SCr 3.89 [09-22 @ 08:04]  SCr 3.71 [09-22 @ 01:13]  SCr 3.66 [09-21 @ 19:34]  SCr 3.76 [09-21 @ 11:27]  SCr 3.92 [09-20 @ 19:52]    Urinalysis - [09-22-23 @ 08:04]      Color  / Appearance  / SG  / pH       Gluc 173 / Ketone   / Bili  / Urobili        Blood  / Protein  / Leuk Est  / Nitrite       RBC  / WBC  / Hyaline  / Gran  / Sq Epi  / Non Sq Epi  / Bacteria       TSH 4.58      [09-22-23 @ 08:04]       [facility] Division of Kidney Diseases & Hypertension  FOLLOW UP NOTE  [phone]--------------------------------------------------------------------------------  Chief Complaint:Heart failure    24 hour events/subjective: Overnight, pt. noted to be anuric despite increased dose of IV Bumex infusion. Pt. was seen and evaluated at bedside this morning. Pt. continues to have generalized weakness, dyspnea, and low urine output. Pt. denies any headaches, fevers/chills, chest pain, and abdominal pain.      PAST HISTORY  --------------------------------------------------------------------------------  No significant changes to PMH, PSH, FHx, SHx, unless otherwise noted    ALLERGIES & MEDICATIONS  --------------------------------------------------------------------------------  Allergies    No Known Allergies    Intolerances      Standing Inpatient Medications  atorvastatin 40 milliGRAM(s) Oral at bedtime  buMETAnide Infusion 2 mG/Hr IV Continuous <Continuous>  dextrose 5%. 1000 milliLiter(s) IV Continuous <Continuous>  dextrose 5%. 1000 milliLiter(s) IV Continuous <Continuous>  dextrose 50% Injectable 12.5 Gram(s) IV Push once  dextrose 50% Injectable 25 Gram(s) IV Push once  dextrose 50% Injectable 25 Gram(s) IV Push once  glucagon  Injectable 1 milliGRAM(s) IntraMuscular once  heparin   Injectable 5000 Unit(s) SubCutaneous every 8 hours  hydrALAZINE 100 milliGRAM(s) Oral three times a day  insulin lispro (ADMELOG) corrective regimen sliding scale   SubCutaneous three times a day before meals  insulin lispro (ADMELOG) corrective regimen sliding scale   SubCutaneous at bedtime  isosorbide   dinitrate Tablet (ISORDIL) 10 milliGRAM(s) Oral three times a day  levothyroxine 125 MICROGram(s) Oral daily  NIFEdipine XL 60 milliGRAM(s) Oral daily  pantoprazole    Tablet 40 milliGRAM(s) Oral before breakfast  sodium bicarbonate 650 milliGRAM(s) Oral three times a day    PRN Inpatient Medications  dextrose Oral Gel 15 Gram(s) Oral once PRN  melatonin 3 milliGRAM(s) Oral at bedtime PRN      REVIEW OF SYSTEMS  --------------------------------------------------------------------------------  Gen: No fevers/chills, +Generalized weakness  Skin: No rashes  Head/Eyes/Ears: No HA   Respiratory: +Dyspnea  CV: See HPI  GI: No abdominal pain, diarrhea  : No dysuria, hematuria  MSK: +B/L LE edema  Heme: No easy bruising or bleeding  Psych: No significant depression    All other systems were reviewed and are negative, except as noted.    VITALS/PHYSICAL EXAM  --------------------------------------------------------------------------------  T(C): --  HR: 61 (09-22-23 @ 09:24) (60 - 62)  BP: 133/105 (09-22-23 @ 09:00) (105/62 - 149/75)  RR: 18 (09-22-23 @ 09:00) (18 - 18)  SpO2: 98% (09-22-23 @ 09:24) (98% - 100%)  Wt(kg): --  Height (cm): 157.5 (09-20-23 @ 15:40)      09-21-23 @ 07:01  -  09-22-23 @ 07:00  --------------------------------------------------------  IN: 0 mL / OUT: 0 mL / NET: 0 mL    09-22-23 @ 07:01  -  09-22-23 @ 10:21  --------------------------------------------------------  IN: 50 mL / OUT: 0 mL / NET: 50 mL      Physical Exam:  Gen: resting, NAD   HEENT: Receiving supplemental oxygen via nasal cannula  Pulm: +Bibasilar crackles  CV: S1S2+  Abd: Soft, +BS   Ext: +BL LE edema  Neuro: Awake and alert  Skin: Warm and dry    LABS/STUDIES  --------------------------------------------------------------------------------              7.9    7.28  >-----------<  233      [09-22-23 @ 08:04]              23.5     121  |  85  |  113  ----------------------------<  173      [09-22-23 @ 08:04]  4.7   |  14  |  3.89        Ca     9.0     [09-22-23 @ 08:04]      Mg     1.90     [09-22-23 @ 08:04]      Phos  7.5     [09-22-23 @ 08:04]    TPro  7.9  /  Alb  3.9  /  TBili  0.4  /  DBili  x   /  AST  29  /  ALT  26  /  AlkPhos  936  [09-22-23 @ 08:04]    PT/INR: PT 14.9 , INR 1.34       [09-22-23 @ 08:04]  PTT: 52.2       [09-22-23 @ 08:04]    CK 77      [09-22-23 @ 01:13]    Creatinine Trend:  SCr 3.89 [09-22 @ 08:04]  SCr 3.71 [09-22 @ 01:13]  SCr 3.66 [09-21 @ 19:34]  SCr 3.76 [09-21 @ 11:27]  SCr 3.92 [09-20 @ 19:52]    TSH 4.58      [09-22-23 @ 08:04] [facility] Division of Kidney Diseases & Hypertension  FOLLOW UP NOTE  [phone]--------------------------------------------------------------------------------    Chief Complaint: ISAMAR on CKD, volume overload    24 hour events/subjective: Overnight, pt. noted to be anuric despite increased rate of IV Bumex infusion. Pt. was seen and evaluated at bedside this morning. Pt. continues to have generalized weakness, dyspnea, and low urine output. Pt. denies any headaches, fevers/chills, chest pain, and abdominal pain.      PAST HISTORY  --------------------------------------------------------------------------------  No significant changes to PMH, PSH, FHx, SHx, unless otherwise noted    ALLERGIES & MEDICATIONS  --------------------------------------------------------------------------------  Allergies    No Known Allergies    Intolerances      Standing Inpatient Medications  atorvastatin 40 milliGRAM(s) Oral at bedtime  buMETAnide Infusion 2 mG/Hr IV Continuous <Continuous>  dextrose 5%. 1000 milliLiter(s) IV Continuous <Continuous>  dextrose 5%. 1000 milliLiter(s) IV Continuous <Continuous>  dextrose 50% Injectable 12.5 Gram(s) IV Push once  dextrose 50% Injectable 25 Gram(s) IV Push once  dextrose 50% Injectable 25 Gram(s) IV Push once  glucagon  Injectable 1 milliGRAM(s) IntraMuscular once  heparin   Injectable 5000 Unit(s) SubCutaneous every 8 hours  hydrALAZINE 100 milliGRAM(s) Oral three times a day  insulin lispro (ADMELOG) corrective regimen sliding scale   SubCutaneous three times a day before meals  insulin lispro (ADMELOG) corrective regimen sliding scale   SubCutaneous at bedtime  isosorbide   dinitrate Tablet (ISORDIL) 10 milliGRAM(s) Oral three times a day  levothyroxine 125 MICROGram(s) Oral daily  NIFEdipine XL 60 milliGRAM(s) Oral daily  pantoprazole    Tablet 40 milliGRAM(s) Oral before breakfast  sodium bicarbonate 650 milliGRAM(s) Oral three times a day    PRN Inpatient Medications  dextrose Oral Gel 15 Gram(s) Oral once PRN  melatonin 3 milliGRAM(s) Oral at bedtime PRN      REVIEW OF SYSTEMS  --------------------------------------------------------------------------------  Gen: No fevers/chills, +Generalized weakness  Skin: No rashes  Head/Eyes/Ears: No HA   Respiratory: +Dyspnea  CV: See HPI  GI: No abdominal pain, diarrhea  : No dysuria, hematuria  MSK: +B/L LE edema  Heme: No easy bruising or bleeding  Psych: No significant depression    All other systems were reviewed and are negative, except as noted.    VITALS/PHYSICAL EXAM  --------------------------------------------------------------------------------  T(C): --  HR: 61 (09-22-23 @ 09:24) (60 - 62)  BP: 133/105 (09-22-23 @ 09:00) (105/62 - 149/75)  RR: 18 (09-22-23 @ 09:00) (18 - 18)  SpO2: 98% (09-22-23 @ 09:24) (98% - 100%)  Wt(kg): --  Height (cm): 157.5 (09-20-23 @ 15:40)      09-21-23 @ 07:01  -  09-22-23 @ 07:00  --------------------------------------------------------  IN: 0 mL / OUT: 0 mL / NET: 0 mL    09-22-23 @ 07:01  -  09-22-23 @ 10:21  --------------------------------------------------------  IN: 50 mL / OUT: 0 mL / NET: 50 mL      Physical Exam:  Gen: resting, NAD   HEENT: Receiving supplemental oxygen via nasal cannula  Pulm: +Bibasilar crackles  CV: S1S2+  Abd: Soft, +BS   Ext: +BL LE edema  Neuro: Awake and alert  Skin: Warm and dry    LABS/STUDIES  --------------------------------------------------------------------------------              7.9    7.28  >-----------<  233      [09-22-23 @ 08:04]              23.5     121  |  85  |  113  ----------------------------<  173      [09-22-23 @ 08:04]  4.7   |  14  |  3.89        Ca     9.0     [09-22-23 @ 08:04]      Mg     1.90     [09-22-23 @ 08:04]      Phos  7.5     [09-22-23 @ 08:04]    TPro  7.9  /  Alb  3.9  /  TBili  0.4  /  DBili  x   /  AST  29  /  ALT  26  /  AlkPhos  936  [09-22-23 @ 08:04]    PT/INR: PT 14.9 , INR 1.34       [09-22-23 @ 08:04]  PTT: 52.2       [09-22-23 @ 08:04]    CK 77      [09-22-23 @ 01:13]    Creatinine Trend:  SCr 3.89 [09-22 @ 08:04]  SCr 3.71 [09-22 @ 01:13]  SCr 3.66 [09-21 @ 19:34]  SCr 3.76 [09-21 @ 11:27]  SCr 3.92 [09-20 @ 19:52]    TSH 4.58      [09-22-23 @ 08:04] [facility] Division of Kidney Diseases & Hypertension  FOLLOW UP NOTE  [phone]--------------------------------------------------------------------------------    Chief Complaint: ISAMAR on CKD, Fluid Overload    24 hour events/subjective: Pt. noted to be anuric despite increased rate of IV Bumex infusion. Pt. seen and evaluated earlier today. Pt. gives history of generalized weakness and SOB. Pt. reports decreased UOP. No fevers/chills, chest pain, abdominal pain or HA.    PAST HISTORY  --------------------------------------------------------------------------------  No significant changes to PMH, PSH, FHx, SHx, unless otherwise noted    ALLERGIES & MEDICATIONS  --------------------------------------------------------------------------------  Allergies    No Known Allergies    Intolerances    Standing Inpatient Medications  atorvastatin 40 milliGRAM(s) Oral at bedtime  buMETAnide Infusion 2 mG/Hr IV Continuous <Continuous>  dextrose 5%. 1000 milliLiter(s) IV Continuous <Continuous>  dextrose 5%. 1000 milliLiter(s) IV Continuous <Continuous>  dextrose 50% Injectable 12.5 Gram(s) IV Push once  dextrose 50% Injectable 25 Gram(s) IV Push once  dextrose 50% Injectable 25 Gram(s) IV Push once  glucagon  Injectable 1 milliGRAM(s) IntraMuscular once  heparin   Injectable 5000 Unit(s) SubCutaneous every 8 hours  hydrALAZINE 100 milliGRAM(s) Oral three times a day  insulin lispro (ADMELOG) corrective regimen sliding scale   SubCutaneous three times a day before meals  insulin lispro (ADMELOG) corrective regimen sliding scale   SubCutaneous at bedtime  isosorbide   dinitrate Tablet (ISORDIL) 10 milliGRAM(s) Oral three times a day  levothyroxine 125 MICROGram(s) Oral daily  NIFEdipine XL 60 milliGRAM(s) Oral daily  pantoprazole    Tablet 40 milliGRAM(s) Oral before breakfast  sodium bicarbonate 650 milliGRAM(s) Oral three times a day    PRN Inpatient Medications  dextrose Oral Gel 15 Gram(s) Oral once PRN  melatonin 3 milliGRAM(s) Oral at bedtime PRN    REVIEW OF SYSTEMS  --------------------------------------------------------------------------------  Gen: No fevers/chills, +generalized weakness  Skin: No rash  Head/Eyes/Ears: No HA   Respiratory: +Dyspnea  CV: See HPI  GI: No abdominal pain, diarrhea  : +Decreased UOP  MSK: +B/L LE edema  Heme: No easy bruising or bleeding  Psych: No significant depression    All other systems were reviewed and are negative, except as noted.    VITALS/PHYSICAL EXAM  --------------------------------------------------------------------------------  T(C): --  HR: 61 (09-22-23 @ 09:24) (60 - 62)  BP: 133/105 (09-22-23 @ 09:00) (105/62 - 149/75)  RR: 18 (09-22-23 @ 09:00) (18 - 18)  SpO2: 98% (09-22-23 @ 09:24) (98% - 100%)  Wt(kg): --  Height (cm): 157.5 (09-20-23 @ 15:40)    09-21-23 @ 07:01  -  09-22-23 @ 07:00  --------------------------------------------------------  IN: 0 mL / OUT: 0 mL / NET: 0 mL    09-22-23 @ 07:01  -  09-22-23 @ 10:21  --------------------------------------------------------  IN: 50 mL / OUT: 0 mL / NET: 50 mL    Physical Exam:  Gen: resting, NAD   HEENT: Receiving supplemental oxygen via nasal cannula  Pulm: +Bibasilar crackles  CV: S1S2+  Abd: Soft, +BS   Ext: +B/L LE pitting edema  Neuro: Awake and alert  Skin: Warm and dry    LABS/STUDIES  --------------------------------------------------------------------------------              7.9    7.28  >-----------<  233      [09-22-23 @ 08:04]              23.5     121  |  85  |  113  ----------------------------<  173      [09-22-23 @ 08:04]  4.7   |  14  |  3.89        Ca     9.0     [09-22-23 @ 08:04]      Mg     1.90     [09-22-23 @ 08:04]      Phos  7.5     [09-22-23 @ 08:04]    TPro  7.9  /  Alb  3.9  /  TBili  0.4  /  DBili  x   /  AST  29  /  ALT  26  /  AlkPhos  936  [09-22-23 @ 08:04]    CK 77      [09-22-23 @ 01:13]    Creatinine Trend:  SCr 3.89 [09-22 @ 08:04]  SCr 3.71 [09-22 @ 01:13]  SCr 3.66 [09-21 @ 19:34]  SCr 3.76 [09-21 @ 11:27]  SCr 3.92 [09-20 @ 19:52]

## 2023-09-22 NOTE — PROGRESS NOTE ADULT - ASSESSMENT
Pt. with ISAMAR on CKD and hyponatremia. Pt. with oliguric ISAMAR on CKD, hyponatremia, metabolic acidosis and fluid overload.

## 2023-09-22 NOTE — DIETITIAN INITIAL EVALUATION ADULT - REASON FOR ADMISSION
Heart failure     Heart failure    57 yo F w/ HTN, HLD, DM2, CKD (stage 3-4), hypothyroidism (c/b myxedema coma in past), severe pulmonary hypertension, mod-severe TR, asthma, HFpEF, presents for progressive dyspnea at rest with left sided chest tightness that started this morning.

## 2023-09-23 LAB
ALBUMIN SERPL ELPH-MCNC: 3.7 G/DL — SIGNIFICANT CHANGE UP (ref 3.3–5)
ALBUMIN SERPL ELPH-MCNC: 3.7 G/DL — SIGNIFICANT CHANGE UP (ref 3.3–5)
ALP SERPL-CCNC: 814 U/L — HIGH (ref 40–120)
ALP SERPL-CCNC: 843 U/L — HIGH (ref 40–120)
ALT FLD-CCNC: 19 U/L — SIGNIFICANT CHANGE UP (ref 4–33)
ALT FLD-CCNC: 19 U/L — SIGNIFICANT CHANGE UP (ref 4–33)
ANION GAP SERPL CALC-SCNC: 18 MMOL/L — HIGH (ref 7–14)
ANION GAP SERPL CALC-SCNC: 20 MMOL/L — HIGH (ref 7–14)
ANISOCYTOSIS BLD QL: SLIGHT — SIGNIFICANT CHANGE UP
APTT BLD: 49 SEC — HIGH (ref 24.5–35.6)
AST SERPL-CCNC: 26 U/L — SIGNIFICANT CHANGE UP (ref 4–32)
AST SERPL-CCNC: 26 U/L — SIGNIFICANT CHANGE UP (ref 4–32)
BASOPHILS # BLD AUTO: 0 K/UL — SIGNIFICANT CHANGE UP (ref 0–0.2)
BASOPHILS # BLD AUTO: 0.01 K/UL — SIGNIFICANT CHANGE UP (ref 0–0.2)
BASOPHILS NFR BLD AUTO: 0 % — SIGNIFICANT CHANGE UP (ref 0–2)
BASOPHILS NFR BLD AUTO: 0.1 % — SIGNIFICANT CHANGE UP (ref 0–2)
BILIRUB SERPL-MCNC: 0.4 MG/DL — SIGNIFICANT CHANGE UP (ref 0.2–1.2)
BILIRUB SERPL-MCNC: 0.4 MG/DL — SIGNIFICANT CHANGE UP (ref 0.2–1.2)
BLD GP AB SCN SERPL QL: NEGATIVE — SIGNIFICANT CHANGE UP
BLOOD GAS VENOUS COMPREHENSIVE RESULT: SIGNIFICANT CHANGE UP
BLOOD GAS VENOUS COMPREHENSIVE RESULT: SIGNIFICANT CHANGE UP
BUN SERPL-MCNC: 83 MG/DL — HIGH (ref 7–23)
BUN SERPL-MCNC: 86 MG/DL — HIGH (ref 7–23)
CA-I BLD-SCNC: 1.1 MMOL/L — LOW (ref 1.15–1.29)
CA-I BLD-SCNC: 1.11 MMOL/L — LOW (ref 1.15–1.29)
CALCIUM SERPL-MCNC: 8.5 MG/DL — SIGNIFICANT CHANGE UP (ref 8.4–10.5)
CALCIUM SERPL-MCNC: 8.9 MG/DL — SIGNIFICANT CHANGE UP (ref 8.4–10.5)
CHLORIDE SERPL-SCNC: 91 MMOL/L — LOW (ref 98–107)
CHLORIDE SERPL-SCNC: 92 MMOL/L — LOW (ref 98–107)
CO2 SERPL-SCNC: 18 MMOL/L — LOW (ref 22–31)
CO2 SERPL-SCNC: 19 MMOL/L — LOW (ref 22–31)
CREAT SERPL-MCNC: 3.31 MG/DL — HIGH (ref 0.5–1.3)
CREAT SERPL-MCNC: 3.37 MG/DL — HIGH (ref 0.5–1.3)
EGFR: 15 ML/MIN/1.73M2 — LOW
EGFR: 16 ML/MIN/1.73M2 — LOW
EOSINOPHIL # BLD AUTO: 0 K/UL — SIGNIFICANT CHANGE UP (ref 0–0.5)
EOSINOPHIL # BLD AUTO: 0.07 K/UL — SIGNIFICANT CHANGE UP (ref 0–0.5)
EOSINOPHIL NFR BLD AUTO: 0 % — SIGNIFICANT CHANGE UP (ref 0–6)
EOSINOPHIL NFR BLD AUTO: 0.9 % — SIGNIFICANT CHANGE UP (ref 0–6)
GIANT PLATELETS BLD QL SMEAR: PRESENT — SIGNIFICANT CHANGE UP
GLUCOSE BLDC GLUCOMTR-MCNC: 102 MG/DL — HIGH (ref 70–99)
GLUCOSE BLDC GLUCOMTR-MCNC: 116 MG/DL — HIGH (ref 70–99)
GLUCOSE BLDC GLUCOMTR-MCNC: 118 MG/DL — HIGH (ref 70–99)
GLUCOSE BLDC GLUCOMTR-MCNC: 136 MG/DL — HIGH (ref 70–99)
GLUCOSE BLDC GLUCOMTR-MCNC: 185 MG/DL — HIGH (ref 70–99)
GLUCOSE SERPL-MCNC: 189 MG/DL — HIGH (ref 70–99)
GLUCOSE SERPL-MCNC: 97 MG/DL — SIGNIFICANT CHANGE UP (ref 70–99)
HBV CORE IGM SER-ACNC: SIGNIFICANT CHANGE UP
HBV SURFACE AB SER-ACNC: <3 MIU/ML — LOW
HBV SURFACE AG SER-ACNC: SIGNIFICANT CHANGE UP
HCT VFR BLD CALC: 20.9 % — CRITICAL LOW (ref 34.5–45)
HCT VFR BLD CALC: 21.2 % — LOW (ref 34.5–45)
HCT VFR BLD CALC: 22.1 % — LOW (ref 34.5–45)
HCV AB S/CO SERPL IA: 0.1 S/CO — SIGNIFICANT CHANGE UP (ref 0–0.99)
HCV AB SERPL-IMP: SIGNIFICANT CHANGE UP
HGB BLD-MCNC: 7 G/DL — CRITICAL LOW (ref 11.5–15.5)
HGB BLD-MCNC: 7.2 G/DL — LOW (ref 11.5–15.5)
HGB BLD-MCNC: 7.3 G/DL — LOW (ref 11.5–15.5)
IANC: 6.84 K/UL — SIGNIFICANT CHANGE UP (ref 1.8–7.4)
IANC: 7.2 K/UL — SIGNIFICANT CHANGE UP (ref 1.8–7.4)
IMM GRANULOCYTES NFR BLD AUTO: 0.9 % — SIGNIFICANT CHANGE UP (ref 0–0.9)
INR BLD: 1.36 RATIO — HIGH (ref 0.85–1.18)
LYMPHOCYTES # BLD AUTO: 0 % — LOW (ref 13–44)
LYMPHOCYTES # BLD AUTO: 0 K/UL — LOW (ref 1–3.3)
LYMPHOCYTES # BLD AUTO: 0.4 K/UL — LOW (ref 1–3.3)
LYMPHOCYTES # BLD AUTO: 4.9 % — LOW (ref 13–44)
MAGNESIUM SERPL-MCNC: 1.8 MG/DL — SIGNIFICANT CHANGE UP (ref 1.6–2.6)
MAGNESIUM SERPL-MCNC: 2.7 MG/DL — HIGH (ref 1.6–2.6)
MCHC RBC-ENTMCNC: 25.2 PG — LOW (ref 27–34)
MCHC RBC-ENTMCNC: 25.4 PG — LOW (ref 27–34)
MCHC RBC-ENTMCNC: 25.6 PG — LOW (ref 27–34)
MCHC RBC-ENTMCNC: 33 GM/DL — SIGNIFICANT CHANGE UP (ref 32–36)
MCHC RBC-ENTMCNC: 33.5 GM/DL — SIGNIFICANT CHANGE UP (ref 32–36)
MCHC RBC-ENTMCNC: 34 GM/DL — SIGNIFICANT CHANGE UP (ref 32–36)
MCV RBC AUTO: 75.2 FL — LOW (ref 80–100)
MCV RBC AUTO: 75.4 FL — LOW (ref 80–100)
MCV RBC AUTO: 77 FL — LOW (ref 80–100)
MICROCYTES BLD QL: SLIGHT — SIGNIFICANT CHANGE UP
MONOCYTES # BLD AUTO: 0 K/UL — SIGNIFICANT CHANGE UP (ref 0–0.9)
MONOCYTES # BLD AUTO: 0.47 K/UL — SIGNIFICANT CHANGE UP (ref 0–0.9)
MONOCYTES NFR BLD AUTO: 0 % — LOW (ref 2–14)
MONOCYTES NFR BLD AUTO: 5.7 % — SIGNIFICANT CHANGE UP (ref 2–14)
MRSA PCR RESULT.: SIGNIFICANT CHANGE UP
NEUTROPHILS # BLD AUTO: 7.2 K/UL — SIGNIFICANT CHANGE UP (ref 1.8–7.4)
NEUTROPHILS # BLD AUTO: 7.67 K/UL — HIGH (ref 1.8–7.4)
NEUTROPHILS NFR BLD AUTO: 100 % — HIGH (ref 43–77)
NEUTROPHILS NFR BLD AUTO: 87.5 % — HIGH (ref 43–77)
NRBC # BLD: 0 /100 WBCS — SIGNIFICANT CHANGE UP (ref 0–0)
NRBC # BLD: 0 /100 WBCS — SIGNIFICANT CHANGE UP (ref 0–0)
NRBC # FLD: 0 K/UL — SIGNIFICANT CHANGE UP (ref 0–0)
NRBC # FLD: 0.02 K/UL — HIGH (ref 0–0)
OVALOCYTES BLD QL SMEAR: SLIGHT — SIGNIFICANT CHANGE UP
PHOSPHATE SERPL-MCNC: 6.6 MG/DL — HIGH (ref 2.5–4.5)
PHOSPHATE SERPL-MCNC: 6.8 MG/DL — HIGH (ref 2.5–4.5)
PLAT MORPH BLD: ABNORMAL
PLATELET # BLD AUTO: 188 K/UL — SIGNIFICANT CHANGE UP (ref 150–400)
PLATELET # BLD AUTO: 210 K/UL — SIGNIFICANT CHANGE UP (ref 150–400)
PLATELET # BLD AUTO: 211 K/UL — SIGNIFICANT CHANGE UP (ref 150–400)
PLATELET COUNT - ESTIMATE: NORMAL — SIGNIFICANT CHANGE UP
POIKILOCYTOSIS BLD QL AUTO: SLIGHT — SIGNIFICANT CHANGE UP
POTASSIUM SERPL-MCNC: 4.3 MMOL/L — SIGNIFICANT CHANGE UP (ref 3.5–5.3)
POTASSIUM SERPL-MCNC: 4.5 MMOL/L — SIGNIFICANT CHANGE UP (ref 3.5–5.3)
POTASSIUM SERPL-SCNC: 4.3 MMOL/L — SIGNIFICANT CHANGE UP (ref 3.5–5.3)
POTASSIUM SERPL-SCNC: 4.5 MMOL/L — SIGNIFICANT CHANGE UP (ref 3.5–5.3)
PROT SERPL-MCNC: 6.7 G/DL — SIGNIFICANT CHANGE UP (ref 6–8.3)
PROT SERPL-MCNC: 6.8 G/DL — SIGNIFICANT CHANGE UP (ref 6–8.3)
PROTHROM AB SERPL-ACNC: 15.1 SEC — HIGH (ref 9.5–13)
RBC # BLD: 2.78 M/UL — LOW (ref 3.8–5.2)
RBC # BLD: 2.81 M/UL — LOW (ref 3.8–5.2)
RBC # BLD: 2.87 M/UL — LOW (ref 3.8–5.2)
RBC # FLD: 14.6 % — HIGH (ref 10.3–14.5)
RBC # FLD: 14.8 % — HIGH (ref 10.3–14.5)
RBC # FLD: 14.8 % — HIGH (ref 10.3–14.5)
RBC BLD AUTO: ABNORMAL
RH IG SCN BLD-IMP: POSITIVE — SIGNIFICANT CHANGE UP
S AUREUS DNA NOSE QL NAA+PROBE: SIGNIFICANT CHANGE UP
SODIUM SERPL-SCNC: 128 MMOL/L — LOW (ref 135–145)
SODIUM SERPL-SCNC: 130 MMOL/L — LOW (ref 135–145)
WBC # BLD: 7.34 K/UL — SIGNIFICANT CHANGE UP (ref 3.8–10.5)
WBC # BLD: 7.67 K/UL — SIGNIFICANT CHANGE UP (ref 3.8–10.5)
WBC # BLD: 8.22 K/UL — SIGNIFICANT CHANGE UP (ref 3.8–10.5)
WBC # FLD AUTO: 7.34 K/UL — SIGNIFICANT CHANGE UP (ref 3.8–10.5)
WBC # FLD AUTO: 7.67 K/UL — SIGNIFICANT CHANGE UP (ref 3.8–10.5)
WBC # FLD AUTO: 8.22 K/UL — SIGNIFICANT CHANGE UP (ref 3.8–10.5)

## 2023-09-23 PROCEDURE — 93308 TTE F-UP OR LMTD: CPT | Mod: 26,GC

## 2023-09-23 PROCEDURE — 76604 US EXAM CHEST: CPT | Mod: 26,GC

## 2023-09-23 PROCEDURE — 93306 TTE W/DOPPLER COMPLETE: CPT | Mod: 26

## 2023-09-23 PROCEDURE — 99233 SBSQ HOSP IP/OBS HIGH 50: CPT | Mod: GC

## 2023-09-23 PROCEDURE — 99291 CRITICAL CARE FIRST HOUR: CPT | Mod: GC,25

## 2023-09-23 RX ORDER — ONDANSETRON 8 MG/1
4 TABLET, FILM COATED ORAL ONCE
Refills: 0 | Status: COMPLETED | OUTPATIENT
Start: 2023-09-23 | End: 2023-09-23

## 2023-09-23 RX ORDER — INSULIN LISPRO 100/ML
VIAL (ML) SUBCUTANEOUS
Refills: 0 | Status: DISCONTINUED | OUTPATIENT
Start: 2023-09-23 | End: 2023-09-25

## 2023-09-23 RX ORDER — MAGNESIUM SULFATE 500 MG/ML
2 VIAL (ML) INJECTION ONCE
Refills: 0 | Status: COMPLETED | OUTPATIENT
Start: 2023-09-23 | End: 2023-09-23

## 2023-09-23 RX ORDER — INSULIN LISPRO 100/ML
VIAL (ML) SUBCUTANEOUS AT BEDTIME
Refills: 0 | Status: DISCONTINUED | OUTPATIENT
Start: 2023-09-23 | End: 2023-10-04

## 2023-09-23 RX ADMIN — HEPARIN SODIUM 5000 UNIT(S): 5000 INJECTION INTRAVENOUS; SUBCUTANEOUS at 06:06

## 2023-09-23 RX ADMIN — CHLORHEXIDINE GLUCONATE 1 APPLICATION(S): 213 SOLUTION TOPICAL at 06:22

## 2023-09-23 RX ADMIN — Medication 650 MILLIGRAM(S): at 16:26

## 2023-09-23 RX ADMIN — ATORVASTATIN CALCIUM 40 MILLIGRAM(S): 80 TABLET, FILM COATED ORAL at 23:54

## 2023-09-23 RX ADMIN — HEPARIN SODIUM 5000 UNIT(S): 5000 INJECTION INTRAVENOUS; SUBCUTANEOUS at 13:55

## 2023-09-23 RX ADMIN — PANTOPRAZOLE SODIUM 40 MILLIGRAM(S): 20 TABLET, DELAYED RELEASE ORAL at 06:06

## 2023-09-23 RX ADMIN — ISOSORBIDE DINITRATE 10 MILLIGRAM(S): 5 TABLET ORAL at 06:05

## 2023-09-23 RX ADMIN — Medication 100 MILLIGRAM(S): at 23:55

## 2023-09-23 RX ADMIN — Medication 125 MICROGRAM(S): at 08:51

## 2023-09-23 RX ADMIN — ISOSORBIDE DINITRATE 10 MILLIGRAM(S): 5 TABLET ORAL at 17:12

## 2023-09-23 RX ADMIN — HEPARIN SODIUM 5000 UNIT(S): 5000 INJECTION INTRAVENOUS; SUBCUTANEOUS at 23:53

## 2023-09-23 RX ADMIN — Medication 100 MILLIGRAM(S): at 06:05

## 2023-09-23 RX ADMIN — Medication 650 MILLIGRAM(S): at 23:53

## 2023-09-23 RX ADMIN — Medication 1: at 12:24

## 2023-09-23 RX ADMIN — Medication 0: at 08:51

## 2023-09-23 RX ADMIN — Medication 0: at 00:35

## 2023-09-23 RX ADMIN — Medication 0: at 16:32

## 2023-09-23 RX ADMIN — Medication 25 GRAM(S): at 02:11

## 2023-09-23 RX ADMIN — Medication 650 MILLIGRAM(S): at 06:05

## 2023-09-23 RX ADMIN — Medication 60 MILLIGRAM(S): at 06:05

## 2023-09-23 RX ADMIN — ONDANSETRON 4 MILLIGRAM(S): 8 TABLET, FILM COATED ORAL at 19:27

## 2023-09-23 NOTE — PROGRESS NOTE ADULT - SUBJECTIVE AND OBJECTIVE BOX
INTERVAL HPI/OVERNIGHT EVENTS:    SUBJECTIVE: Patient seen and examined at bedside. Intubated, sedated, ROS cannot be obtained.       VITAL SIGNS:  ICU Vital Signs Last 24 Hrs  T(C): 35.6 (23 Sep 2023 04:00), Max: 36.9 (23 Sep 2023 00:00)  T(F): 96.1 (23 Sep 2023 04:00), Max: 98.4 (23 Sep 2023 00:00)  HR: 76 (23 Sep 2023 06:27) (59 - 91)  BP: 150/76 (23 Sep 2023 06:00) (116/58 - 150/76)  BP(mean): 96 (23 Sep 2023 06:00) (76 - 98)  ABP: --  ABP(mean): --  RR: 20 (23 Sep 2023 06:00) (12 - 29)  SpO2: 96% (23 Sep 2023 06:27) (95% - 100%)    O2 Parameters below as of 22 Sep 2023 20:00  Patient On (Oxygen Delivery Method): nasal cannula w/ humidification  O2 Flow (L/min): 2          Plateau pressure:   P/F ratio:     09-22 @ 07:01  -  09-23 @ 07:00  --------------------------------------------------------  IN: 500 mL / OUT: 2105 mL / NET: -1605 mL      CAPILLARY BLOOD GLUCOSE      POCT Blood Glucose.: 102 mg/dL (23 Sep 2023 05:51)    ECG:    PHYSICAL EXAMINATION:  General: Comfortable, no acute distress, cooperative with exam.  HEENT: PERRLA, EOMI, moist mucous membranes.  Respiratory: CTAB, normal respiratory effort, no coughing, wheezes, crackles, or rales.  CV: RRR, S1S2, no murmurs, rubs or gallops. No JVD. Distal pulses intact.  Abdominal: Soft, nontender, nondistended, no rebound or guarding, normal bowel sounds.  Neurology: AOx3, no focal neuro defects, MARTINEZ x 4.  Extremities: No pitting edema, + Peripheral pulses.  Incisions:   Tubes:    MEDICATIONS:  MEDICATIONS  (STANDING):  atorvastatin 40 milliGRAM(s) Oral at bedtime  chlorhexidine 2% Cloths 1 Application(s) Topical <User Schedule>  dextrose 5%. 1000 milliLiter(s) (50 mL/Hr) IV Continuous <Continuous>  dextrose 5%. 1000 milliLiter(s) (100 mL/Hr) IV Continuous <Continuous>  dextrose 50% Injectable 12.5 Gram(s) IV Push once  dextrose 50% Injectable 25 Gram(s) IV Push once  dextrose 50% Injectable 25 Gram(s) IV Push once  glucagon  Injectable 1 milliGRAM(s) IntraMuscular once  heparin   Injectable 5000 Unit(s) SubCutaneous every 8 hours  hydrALAZINE 100 milliGRAM(s) Oral three times a day  insulin lispro (ADMELOG) corrective regimen sliding scale   SubCutaneous at bedtime  insulin lispro (ADMELOG) corrective regimen sliding scale   SubCutaneous three times a day before meals  isosorbide   dinitrate Tablet (ISORDIL) 10 milliGRAM(s) Oral three times a day  levothyroxine 125 MICROGram(s) Oral daily  NIFEdipine XL 60 milliGRAM(s) Oral daily  pantoprazole    Tablet 40 milliGRAM(s) Oral before breakfast  sodium bicarbonate 650 milliGRAM(s) Oral three times a day    MEDICATIONS  (PRN):  dextrose Oral Gel 15 Gram(s) Oral once PRN Blood Glucose LESS THAN 70 milliGRAM(s)/deciliter  melatonin 3 milliGRAM(s) Oral at bedtime PRN Insomnia      ALLERGIES:  Allergies    No Known Allergies    Intolerances        LABS:                        7.0    7.34  )-----------( 211      ( 23 Sep 2023 01:10 )             20.9     09-23    130<L>  |  91<L>  |  83<H>  ----------------------------<  97  4.3   |  19<L>  |  3.31<H>    Ca    8.9      23 Sep 2023 01:10  Phos  6.6     09-23  Mg     1.80     09-23    TPro  6.8  /  Alb  3.7  /  TBili  0.4  /  DBili  x   /  AST  26  /  ALT  19  /  AlkPhos  843<H>  09-23    PT/INR - ( 23 Sep 2023 01:10 )   PT: 15.1 sec;   INR: 1.36 ratio         PTT - ( 23 Sep 2023 01:10 )  PTT:49.0 sec  Urinalysis Basic - ( 23 Sep 2023 01:10 )    Color: x / Appearance: x / SG: x / pH: x  Gluc: 97 mg/dL / Ketone: x  / Bili: x / Urobili: x   Blood: x / Protein: x / Nitrite: x   Leuk Esterase: x / RBC: x / WBC x   Sq Epi: x / Non Sq Epi: x / Bacteria: x        RADIOLOGY & ADDITIONAL TESTS: Reviewed.

## 2023-09-23 NOTE — DIETITIAN INITIAL EVALUATION ADULT - PROBLEM SELECTOR PROBLEM 3
Acute renal failure superimposed on chronic kidney disease
Acute renal failure superimposed on chronic kidney disease

## 2023-09-23 NOTE — PHYSICAL THERAPY INITIAL EVALUATION ADULT - PERTINENT HX OF CURRENT PROBLEM, REHAB EVAL
Pt is a 58 year old female presenting with progressive dyspnea at rest associated with left sided chest tightness, weakness, non-productive cough, decrease PO intake, nausea, and diarrhea. CXR significant for pulmonary edema with small bilateral pleural effusions. CT A/P revealed new small pericardial effusion, small bilateral pleural effusions with diffuse groundglass attenuation of the lungs which may represent pulmonary edema., enlarged liver with residual findings consistent with passive liver congestion, and trace ascites. Pt admitted for hypoxic respiratory failure in the setting of HFpEF exacerbation c/b anuric renal failure requiring devin placement for urgent HD.

## 2023-09-23 NOTE — PHYSICAL THERAPY INITIAL EVALUATION ADULT - PATIENT PROFILE REVIEW, REHAB EVAL
PT initial evaluation received and chart review completed. Pt agreeable to participate in PT evaluation. Pt cleared by RN Fe./yes

## 2023-09-23 NOTE — DIETITIAN INITIAL EVALUATION ADULT - OTHER INFO
Patient A1c trending down 6.3 June 23 and 6.0 August 26. POCT Glucose not tightly controlled September 21 and 22 >200 mg/dL. Potassium with in limits. Phosporus trending high since August 30 4.6 / 7.3 / 7.3 / 7.3 / 7.4 / 7.5 mg/dL.     Patient with fluctuating weights per HIE, 150.6 lbs June 20, 119.2 lbs February 8, 132.4 lbs February 25, no new weights entered into HIE. 
Per chart, pt is 58 year old female PMH HTN, HLD, type 2 DM, CKD (stage 3-4), hypothyroidism (complicated by myxedema coma in past), severe pulmonary HTN, mod-severe TR, asthma, HFpEF presenting for progressive dyspnea at rest with left sided chest tightness found to be in ADHF transferred to MICU for urgent HD due to acidosis and volume overload. Nephrology following.    Pt confirms NKFA, denies difficulties chewing/swallowing. Pt lives at home with , no issues with access to/preparation of food PTA. History of type 2 DM, recent HbA1c (8/26) 6.0%; medications PTA inclusive of Repaglinide 0.5 mg TID. Pt reports generally good appetite/PO separate from onset of nausea/diarrhea for a few days PTA.     Pt with continued poor appetite/PO intake since admission. Pt does not wish to receive chicken, beef or pork in house. Preference vocalized for soup, declines broth. Therapeutic diet restrictions limit menu availability. Pt amenable to trial of nutrition shake. Labs notable for hyperphosphatemia, hypermagnesemia. Fingersticks WDL.

## 2023-09-23 NOTE — PROGRESS NOTE ADULT - ATTENDING COMMENTS
58F PMH of CKD, HTN/HLD, sev.pHTN, HFpEF, Asthma p/w progressive dyspnea iso volume overload subsequently requiring MICU admission for iHD for volume and metabolic disarray.       #ARF on CKD c/b volume overload   - iHD, renal following  - aiming for net negative as BP allows   #pHTN, HfpEF  - Bedside echo today with pericardial effusion -> formal TTE  # Hyponatremia / metabolic acidosis 2/2 renal failure   - continue iHD  - Goal correction of HypoNa 8 over 24 hrs.   #POCUS today  #DVT ppx - hsq  #GOC - full code, bedside discussion today

## 2023-09-23 NOTE — DIETITIAN INITIAL EVALUATION ADULT - ADD RECOMMEND
1. Monitor bowel movements, labs, PO intake/tolerance, skin integrity, and weights 
1) Recommend change diet to consistent carbohydrate renal. Fluid restriction per medical discretion.  2) RDN to provide [employer]gain Vegan shake 1 PO 2x daily (provides 220 kcal, 16 gm protein per 11oz serving).  3) Obtained food preferences, will honor as able.  4) Obtain daily weights.

## 2023-09-23 NOTE — DIETITIAN INITIAL EVALUATION ADULT - NAME AND PHONE
Cheryl Bowens MS, RDN, CDN [id number] TEAMS 
Lizbeth Souza RDN, CDN #[id number]  Also available on Microsoft Teams

## 2023-09-23 NOTE — PROGRESS NOTE ADULT - SUBJECTIVE AND OBJECTIVE BOX
[facility] Division of Kidney Diseases & Hypertension  FOLLOW UP NOTE  [phone]--------------------------------------------------------------------------------    Chief Complaint: ISAMAR on CKD, Fluid Overload    24 hour events/subjective: Pt. was seen and evaluated in the MICU this morning. Resting comfortably, offers no com[plaints. Received HD treatment yesterday, was well tolerated.      PAST HISTORY  --------------------------------------------------------------------------------  No significant changes to PMH, PSH, FHx, SHx, unless otherwise noted    ALLERGIES & MEDICATIONS  --------------------------------------------------------------------------------  Allergies    No Known Allergies    Intolerances      Standing Inpatient Medications  atorvastatin 40 milliGRAM(s) Oral at bedtime  chlorhexidine 2% Cloths 1 Application(s) Topical <User Schedule>  dextrose 5%. 1000 milliLiter(s) IV Continuous <Continuous>  dextrose 5%. 1000 milliLiter(s) IV Continuous <Continuous>  dextrose 50% Injectable 25 Gram(s) IV Push once  dextrose 50% Injectable 12.5 Gram(s) IV Push once  dextrose 50% Injectable 25 Gram(s) IV Push once  glucagon  Injectable 1 milliGRAM(s) IntraMuscular once  heparin   Injectable 5000 Unit(s) SubCutaneous every 8 hours  hydrALAZINE 100 milliGRAM(s) Oral three times a day  insulin lispro (ADMELOG) corrective regimen sliding scale   SubCutaneous at bedtime  insulin lispro (ADMELOG) corrective regimen sliding scale   SubCutaneous three times a day before meals  isosorbide   dinitrate Tablet (ISORDIL) 10 milliGRAM(s) Oral three times a day  levothyroxine 125 MICROGram(s) Oral daily  NIFEdipine XL 60 milliGRAM(s) Oral daily  pantoprazole    Tablet 40 milliGRAM(s) Oral before breakfast  sodium bicarbonate 650 milliGRAM(s) Oral three times a day    PRN Inpatient Medications  dextrose Oral Gel 15 Gram(s) Oral once PRN  melatonin 3 milliGRAM(s) Oral at bedtime PRN      REVIEW OF SYSTEMS  --------------------------------------------------------------------------------  Gen: No fevers/chills,  Skin: No rash  Head/Eyes/Ears: No HA   Respiratory: +Dyspnea (improved)  CV: No chest pain  GI: No abdominal pain, diarrhea  : +Decreased UOP  MSK: +B/L LE edema (improving)  Heme: No easy bruising or bleeding  Psych: No significant depression    All other systems were reviewed and are negative, except as noted.    VITALS/PHYSICAL EXAM  --------------------------------------------------------------------------------  T(C): 35.6 (09-23-23 @ 04:00), Max: 36.9 (09-23-23 @ 00:00)  HR: 79 (09-23-23 @ 08:00) (59 - 91)  BP: 136/66 (09-23-23 @ 08:00) (116/58 - 150/76)  RR: 12 (09-23-23 @ 08:00) (12 - 29)  SpO2: 97% (09-23-23 @ 08:00) (95% - 100%)  Wt(kg): --    Weight (kg): 60.7 (09-22-23 @ 13:07)      09-22-23 @ 07:01  -  09-23-23 @ 07:00  --------------------------------------------------------  IN: 500 mL / OUT: 2105 mL / NET: -1605 mL    Physical Exam:  Gen: resting, NAD   HEENT: Receiving supplemental oxygen via nasal cannula  Pulm: +Bibasilar crackles  CV: S1S2+  Abd: Soft, +BS   Ext: +B/L LE pitting edema (improving)  Neuro: Awake and alert  Skin: Warm and dry    LABS/STUDIES  --------------------------------------------------------------------------------              7.0    7.34  >-----------<  211      [09-23-23 @ 01:10]              20.9     130  |  91  |  83  ----------------------------<  97      [09-23-23 @ 01:10]  4.3   |  19  |  3.31        Ca     8.9     [09-23-23 @ 01:10]      iCa    1.11     [09-23 @ 01:10]      Mg     1.80     [09-23-23 @ 01:10]      Phos  6.6     [09-23-23 @ 01:10]    TPro  6.8  /  Alb  3.7  /  TBili  0.4  /  DBili  x   /  AST  26  /  ALT  19  /  AlkPhos  843  [09-23-23 @ 01:10]    PT/INR: PT 15.1 , INR 1.36       [09-23-23 @ 01:10]  PTT: 49.0       [09-23-23 @ 01:10]    CK 77      [09-22-23 @ 01:13]    Creatinine Trend:  SCr 3.31 [09-23 @ 01:10]  SCr 3.18 [09-22 @ 22:25]  SCr 3.89 [09-22 @ 08:04]  SCr 3.71 [09-22 @ 01:13]  SCr 3.66 [09-21 @ 19:34]    TSH 4.58      [09-22-23 @ 08:04]    HBsAb <3.0      [09-22-23 @ 17:55]  HBsAg Nonreact      [09-22-23 @ 17:55]  HCV 0.10, Nonreact      [09-22-23 @ 17:55]

## 2023-09-23 NOTE — PROGRESS NOTE ADULT - ASSESSMENT
ASSESSMENT  Ms. Hernandez is a 59 yo F w/ HTN, HLD, DM2, CKD (stage 3-4), hypothyroidism (c/b myxedema coma in past), severe pulmonary hypertension, mod-severe TR, asthma, HFpEF, presents for progressive dyspnea at rest with left sided chest tightness, found to be in ADHF, admitted to MICU for urgent HD due to acidosis and volume OL, anuric on bumex drip    PLAN  =====Neurologic=====  No active issues    =====Pulmonary=====  #Dyspnea   Plan: Acute on chronic right sided CHF vs progressive renal failure vs PE (will need to r/o with PE, but given kidney dysfunction will do dopplers of LE first to r/o DVT given high risk of ESRD with contrast).   -STAT duplex of lower extremity reordered to r/o DVT  -troponin 65->64  -pro-BNP 2092   -RVP negative   Most recent TTE (8/6) showing HFpEF with elevated RVSP and RV dilation.   -TTE ordered   - escalated to bumex drip, poor UOP  - Currently on 3L NC, kai    =====Cardiovascular=====  #Acute on chronic diastolic congestive heart failure  - Last EF 8/2023 69%  - Bumex gtt 2mg/hr->Urgent HD for volume removal due to anuria  - Strict I/O  - Daily standing weights  - Monitor lytes replete K>4.0 and Mg>2.0   - Trend SCr  - HF following, appreciate recommendations    #HTN  - Hydralazine. Isosorbide dinitrate, Nifedipine    =====GI=====  #GERD  - Protonix 40mg IV QD    #Diet  - NPO except medications    =====Renal/=====  #Electrolytes  - Maintain K>4, Phos>3, Mag>2, iCal>1    #Hyponatremia  - Pt. with hypervolemia hyponatremia.   - Last SNa low at 121.   - Pt. initiated on IV Bumex infusion for hypervolemia management->Urgent HD  - fluid restriction (<1 L/day).   - Monitor SNa q8 hrs.   - Avoid overcorrection of SNa.    #Acute renal failure superimposed on chronic kidney disease.   - Pt with CKD 3-4, baseline cr approximately 2, now with ISAMAR on CKD  - Monitor renal function/UOP, avoid nephrotoxins  - Sodium Bicarb Tabs  - urgent HD today per nephro for volume overload and anuria,     #Metabolic acidosis  Pt. with metabolic acidosis in the setting of volume overload and renal failure. pH is low at 7.19, and SCO2 decreased to 14 today.   - Plan to initiate HD, as outlined above.   - Monitor SCO2    =====Endocrine=====  #DM2  - While NPO, FSBG and ADRIAN q6h    #Hypothyroidism  - Synthroid 125 mcg daily    =====Infectious Disease=====  Pt hypothermic on oral and rectal temp (unable to obtain temp on thermometer)  - f/u blood cultures, UA    =====Heme/Onc=====  #DVT Ppx  - heparin 5000 subQ    #Anemia  - Hgb 7-8, not clinically bleeding. Likely in s/o renal disease.   - CTM, maintain Hgb >7    =====Ethics=====  FULL CODE

## 2023-09-23 NOTE — PHYSICAL THERAPY INITIAL EVALUATION ADULT - MANUAL MUSCLE TESTING RESULTS, REHAB EVAL
Bilateral UE grossly at least 3/5. In supine, patient able to perform bilateral ankle pumps, and knee flexion to extension through full ROM.

## 2023-09-23 NOTE — PROGRESS NOTE ADULT - PROBLEM SELECTOR PLAN 1
Pt. with oliguric ISAMAR on CKD in setting of HF and fluid overload. On review of labs on [place], Scr elevated at 3.31 on 8/30/23, increased to 3.92 on admission (9/20). Pt. initiated on IV Bumex infusion on 9/21. However, pt. remained anuric despite increase in IV Bumex infusion rate. Scr increased to 3.89 on 9/22. Pt. underwent Right IJ non-tunneled HD catheter placement and received 1st HD treatment on 9/22. Pt. clinically stable, remains hypervolemic on exam.. Labs reviewed. Plan for 2nd HD treatment today. Monitor labs and urine output. Avoid any potential nephrotoxins. Dose medications as per eGFR.

## 2023-09-23 NOTE — PROGRESS NOTE ADULT - PROBLEM SELECTOR PLAN 3
Pt. with metabolic acidosis in the setting of advanced kidney failure. SCO2 remains low but improved to 18 today following HD treatment. Monitor SCO2.    If you have any questions, please feel free to contact me  Cody Mcguire  Nephrology Fellow  [phone] / Microsoft Teams(Preferred)  (After 5pm or on weekends please page the on-call fellow)

## 2023-09-23 NOTE — DIETITIAN INITIAL EVALUATION ADULT - OTHER CALCULATIONS
Weight history, per HIE: (9/22 dosing) 133.8 lbs / 60.7 kg, (8/12) 123 lbs, (6/13) 121 lbs, (3/13) 121 lbs.  Ideal Body Weight: 110 lbs / 50 kg +/-10%

## 2023-09-23 NOTE — DIETITIAN INITIAL EVALUATION ADULT - NS FNS DIET ORDER
Diet, Regular:   Consistent Carbohydrate {Evening Snack} (CSTCHOSN)  DASH/TLC {Sodium & Cholesterol Restricted} (DASH)  1000mL Fluid Restriction (NECAOB0036)  No Concentrated Potassium  Low Sodium  No Concentrated Phosphorus (09-21-23 @ 18:33)  
Consistent Carbohydrate {Evening Snack}  DASH/TLC {Sodium & Cholesterol Restricted}  1500mL Fluid Restriction  No Concentrated Potassium  Low Sodium  No Concentrated Phosphorus  No Beef  No Pork

## 2023-09-23 NOTE — DIETITIAN INITIAL EVALUATION ADULT - NSICDXPASTMEDICALHX_GEN_ALL_CORE_FT
PAST MEDICAL HISTORY:  (HFpEF) heart failure with preserved ejection fraction     CKD (chronic kidney disease)     DM (diabetes mellitus)     H/O pulmonary hypertension     HLD (hyperlipidemia)     HTN (hypertension)     Hypothyroid     
PAST MEDICAL HISTORY:  (HFpEF) heart failure with preserved ejection fraction     CKD (chronic kidney disease)     DM (diabetes mellitus)     H/O pulmonary hypertension     HLD (hyperlipidemia)     HTN (hypertension)     Hypothyroid

## 2023-09-23 NOTE — CHART NOTE - NSCHARTNOTEFT_GEN_A_CORE
: Abiola Lewis    INDICATION: critical illness    PROCEDURE:  [x] LIMITED ECHO  [x] LIMITED CHEST  [ ] LIMITED RETROPERITONEAL  [ ] LIMITED ABDOMINAL  [ ] LIMITED DVT  [ ] NEEDLE GUIDANCE VASCULAR  [ ] NEEDLE GUIDANCE THORACENTESIS  [ ] NEEDLE GUIDANCE PARACENTESIS  [ ] NEEDLE GUIDANCE PERICARDIOCENTESIS  [ ] OTHER    FINDINGS:  Diffuse B line predominance bilaterally L > R simple pleural effusion surrounding consolidated lung   LVSF preserved, RV < LV  +Pericardial effusion, ~1.1cm anteriorly without evidence of RA or RV collapse  Plethoric IVC    INTERPRETATION:  B line predominance, bilateral pleural effusions, and pericardial effusion consistent with volume overload state  No sign of cardiac tamponade, pt hemodynamically stable  Continue volume removal with HD    Images uploaded on [facility] : Abiola Lewis    INDICATION: critical illness    PROCEDURE:  [x] LIMITED ECHO  [x] LIMITED CHEST  [ ] LIMITED RETROPERITONEAL  [ ] LIMITED ABDOMINAL  [ ] LIMITED DVT  [ ] NEEDLE GUIDANCE VASCULAR  [ ] NEEDLE GUIDANCE THORACENTESIS  [ ] NEEDLE GUIDANCE PARACENTESIS  [ ] NEEDLE GUIDANCE PERICARDIOCENTESIS  [ ] OTHER    FINDINGS:  Diffuse B line predominance bilaterally L > R simple pleural effusion surrounding consolidated lung   LVSF preserved, RV < LV  +Pericardial effusion, ~1.1cm anteriorly without evidence of RA or RV collapse  Plethoric IVC    INTERPRETATION:  B line predominance, bilateral pleural effusions, and pericardial effusion consistent with volume overload state  No sign of cardiac tamponade, pt hemodynamically stable  Continue volume removal with HD    Images uploaded on [facility]      Attending Addendum:     I was present during the entire procedure and agree with the above findings and interpretation. TIme spent on the procedure was separate from the critical care time spent caring for the patient.     Bryan Chaudhari MD

## 2023-09-23 NOTE — DIETITIAN INITIAL EVALUATION ADULT - PERTINENT LABORATORY DATA
09-22    121<L>  |  85<L>  |  113<H>  ----------------------------<  173<H>  4.7   |  14<L>  |  3.89<H>    Ca    9.0      22 Sep 2023 08:04  Phos  7.5     09-22  Mg     1.90     09-22    TPro  7.9  /  Alb  3.9  /  TBili  0.4  /  DBili  x   /  AST  29  /  ALT  26  /  AlkPhos  936<H>  09-22  POCT Blood Glucose.: 196 mg/dL (09-22-23 @ 08:42)  A1C with Estimated Average Glucose Result: 6.0 % (08-26-23 @ 08:22)  A1C with Estimated Average Glucose Result: 6.3 % (06-23-23 @ 07:53)  A1C with Estimated Average Glucose Result: 6.1 % (02-26-23 @ 05:32)  
(9/23) Na 128<L>, BUN 86<H>, Cr 3.37<H>, <H>, K+ 4.5, Phos 6.8<H>, Mg 2.70<H>, Alk Phos 814<H>, ALT/SGPT 19, AST/SGOT 26  POCT: (9/23) 102-185, (9/22) 192-224

## 2023-09-23 NOTE — DIETITIAN INITIAL EVALUATION ADULT - PERTINENT MEDS FT
atorvastatin   ADMELOG corrective regimen sliding scale  levothyroxine   pantoprazole Tablet  sodium bicarbonate 
MEDICATIONS  (STANDING):  atorvastatin 40 milliGRAM(s) Oral at bedtime  buMETAnide Infusion 2 mG/Hr (10 mL/Hr) IV Continuous <Continuous>  dextrose 5%. 1000 milliLiter(s) (50 mL/Hr) IV Continuous <Continuous>  dextrose 5%. 1000 milliLiter(s) (100 mL/Hr) IV Continuous <Continuous>  dextrose 50% Injectable 12.5 Gram(s) IV Push once  dextrose 50% Injectable 25 Gram(s) IV Push once  dextrose 50% Injectable 25 Gram(s) IV Push once  glucagon  Injectable 1 milliGRAM(s) IntraMuscular once  heparin   Injectable 5000 Unit(s) SubCutaneous every 8 hours  hydrALAZINE 100 milliGRAM(s) Oral three times a day  insulin lispro (ADMELOG) corrective regimen sliding scale   SubCutaneous three times a day before meals  insulin lispro (ADMELOG) corrective regimen sliding scale   SubCutaneous at bedtime  isosorbide   dinitrate Tablet (ISORDIL) 10 milliGRAM(s) Oral three times a day  levothyroxine 125 MICROGram(s) Oral daily  NIFEdipine XL 60 milliGRAM(s) Oral daily  pantoprazole    Tablet 40 milliGRAM(s) Oral before breakfast  sodium bicarbonate 650 milliGRAM(s) Oral three times a day    MEDICATIONS  (PRN):  dextrose Oral Gel 15 Gram(s) Oral once PRN Blood Glucose LESS THAN 70 milliGRAM(s)/deciliter  melatonin 3 milliGRAM(s) Oral at bedtime PRN Insomnia

## 2023-09-23 NOTE — PHYSICAL THERAPY INITIAL EVALUATION ADULT - ADDITIONAL COMMENTS
Pt lives in a private house with her  and children; there are 6 steps to enter and ~11 steps to second floor.

## 2023-09-23 NOTE — DIETITIAN INITIAL EVALUATION ADULT - ETIOLOGY
inability to meet increased protein-energy needs with HD, decreased appetite with acute illness endocrine, renal related organ dysfunction

## 2023-09-23 NOTE — PHYSICAL THERAPY INITIAL EVALUATION ADULT - GENERAL OBSERVATIONS, REHAB EVAL
Upon entry, pt semi-supine in bed in NAD; + ICU monitoring, + faye, + nasal cannula. /65 HR 67 bpm SpO2 97% on 2L O2 via nasal cannula.

## 2023-09-23 NOTE — PROGRESS NOTE ADULT - PROBLEM SELECTOR PLAN 2
Pt. with hypervolemic hyponatremia. SNa remains low but improved to 130 today following HD treatment. Monitor SNa.

## 2023-09-24 LAB
ALBUMIN FLD-MCNC: 3.4 G/DL — SIGNIFICANT CHANGE UP
ALBUMIN SERPL ELPH-MCNC: 3.3 G/DL — SIGNIFICANT CHANGE UP (ref 3.3–5)
ALBUMIN SERPL ELPH-MCNC: 3.7 G/DL — SIGNIFICANT CHANGE UP (ref 3.3–5)
ALP SERPL-CCNC: 1017 U/L — HIGH (ref 40–120)
ALP SERPL-CCNC: 900 U/L — HIGH (ref 40–120)
ALT FLD-CCNC: 32 U/L — SIGNIFICANT CHANGE UP (ref 4–33)
ALT FLD-CCNC: 395 U/L — HIGH (ref 4–33)
ANION GAP SERPL CALC-SCNC: 23 MMOL/L — HIGH (ref 7–14)
ANION GAP SERPL CALC-SCNC: 32 MMOL/L — HIGH (ref 7–14)
ANISOCYTOSIS BLD QL: SLIGHT — SIGNIFICANT CHANGE UP
APTT BLD: 73.9 SEC — HIGH (ref 24.5–35.6)
AST SERPL-CCNC: 63 U/L — HIGH (ref 4–32)
AST SERPL-CCNC: 737 U/L — HIGH (ref 4–32)
B PERT IGG+IGM PNL SER: ABNORMAL
BASOPHILS # BLD AUTO: 0 K/UL — SIGNIFICANT CHANGE UP (ref 0–0.2)
BASOPHILS # BLD AUTO: 0.01 K/UL — SIGNIFICANT CHANGE UP (ref 0–0.2)
BASOPHILS NFR BLD AUTO: 0 % — SIGNIFICANT CHANGE UP (ref 0–2)
BASOPHILS NFR BLD AUTO: 0.1 % — SIGNIFICANT CHANGE UP (ref 0–2)
BILIRUB SERPL-MCNC: 1 MG/DL — SIGNIFICANT CHANGE UP (ref 0.2–1.2)
BILIRUB SERPL-MCNC: 1.9 MG/DL — HIGH (ref 0.2–1.2)
BLOOD GAS ARTERIAL COMPREHENSIVE RESULT: SIGNIFICANT CHANGE UP
BLOOD GAS VENOUS COMPREHENSIVE RESULT: SIGNIFICANT CHANGE UP
BUN SERPL-MCNC: 52 MG/DL — HIGH (ref 7–23)
BUN SERPL-MCNC: 53 MG/DL — HIGH (ref 7–23)
CA-I BLD-SCNC: 1.03 MMOL/L — LOW (ref 1.15–1.29)
CALCIUM SERPL-MCNC: 7.9 MG/DL — LOW (ref 8.4–10.5)
CALCIUM SERPL-MCNC: 8.8 MG/DL — SIGNIFICANT CHANGE UP (ref 8.4–10.5)
CHLORIDE SERPL-SCNC: 94 MMOL/L — LOW (ref 98–107)
CHLORIDE SERPL-SCNC: 98 MMOL/L — SIGNIFICANT CHANGE UP (ref 98–107)
CK MB BLD-MCNC: 16.3 % — HIGH (ref 0–2.5)
CK MB CFR SERPL CALC: 8.5 NG/ML — HIGH
CK SERPL-CCNC: 52 U/L — SIGNIFICANT CHANGE UP (ref 25–170)
CO2 SERPL-SCNC: 12 MMOL/L — LOW (ref 22–31)
CO2 SERPL-SCNC: 19 MMOL/L — LOW (ref 22–31)
COLOR FLD: SIGNIFICANT CHANGE UP
CREAT SERPL-MCNC: 2.58 MG/DL — HIGH (ref 0.5–1.3)
CREAT SERPL-MCNC: 2.89 MG/DL — HIGH (ref 0.5–1.3)
EGFR: 18 ML/MIN/1.73M2 — LOW
EGFR: 21 ML/MIN/1.73M2 — LOW
EOSINOPHIL # BLD AUTO: 0 K/UL — SIGNIFICANT CHANGE UP (ref 0–0.5)
EOSINOPHIL # BLD AUTO: 0.03 K/UL — SIGNIFICANT CHANGE UP (ref 0–0.5)
EOSINOPHIL # FLD: 0 % — SIGNIFICANT CHANGE UP
EOSINOPHIL NFR BLD AUTO: 0 % — SIGNIFICANT CHANGE UP (ref 0–6)
EOSINOPHIL NFR BLD AUTO: 0.4 % — SIGNIFICANT CHANGE UP (ref 0–6)
FLUID INTAKE SUBSTANCE CLASS: SIGNIFICANT CHANGE UP
FOLATE+VIT B12 SERBLD-IMP: 0 % — SIGNIFICANT CHANGE UP
GAS PNL BLDA: SIGNIFICANT CHANGE UP
GIANT PLATELETS BLD QL SMEAR: PRESENT — SIGNIFICANT CHANGE UP
GLUCOSE BLDC GLUCOMTR-MCNC: 159 MG/DL — HIGH (ref 70–99)
GLUCOSE BLDC GLUCOMTR-MCNC: 178 MG/DL — HIGH (ref 70–99)
GLUCOSE BLDC GLUCOMTR-MCNC: 179 MG/DL — HIGH (ref 70–99)
GLUCOSE BLDC GLUCOMTR-MCNC: 194 MG/DL — HIGH (ref 70–99)
GLUCOSE BLDC GLUCOMTR-MCNC: 197 MG/DL — HIGH (ref 70–99)
GLUCOSE BLDC GLUCOMTR-MCNC: 380 MG/DL — HIGH (ref 70–99)
GLUCOSE BLDC GLUCOMTR-MCNC: 59 MG/DL — LOW (ref 70–99)
GLUCOSE FLD-MCNC: 61 MG/DL — SIGNIFICANT CHANGE UP
GLUCOSE SERPL-MCNC: 144 MG/DL — HIGH (ref 70–99)
GLUCOSE SERPL-MCNC: 160 MG/DL — HIGH (ref 70–99)
HCT VFR BLD CALC: 23.4 % — LOW (ref 34.5–45)
HCT VFR BLD CALC: 23.7 % — LOW (ref 34.5–45)
HGB BLD-MCNC: 7.4 G/DL — LOW (ref 11.5–15.5)
HGB BLD-MCNC: 7.7 G/DL — LOW (ref 11.5–15.5)
IANC: 19.69 K/UL — HIGH (ref 1.8–7.4)
IANC: 5.8 K/UL — SIGNIFICANT CHANGE UP (ref 1.8–7.4)
IMM GRANULOCYTES NFR BLD AUTO: 1.3 % — HIGH (ref 0–0.9)
INR BLD: 1.56 RATIO — HIGH (ref 0.85–1.18)
LACTATE BLDA-MCNC: 10.8 MMOL/L — CRITICAL HIGH (ref 0.5–2)
LACTATE BLDA-MCNC: 13 MMOL/L — CRITICAL HIGH (ref 0.5–2)
LACTATE BLDA-MCNC: 13.4 MMOL/L — CRITICAL HIGH (ref 0.5–2)
LACTATE BLDA-MCNC: 13.5 MMOL/L — CRITICAL HIGH (ref 0.5–2)
LACTATE BLDA-MCNC: 14.3 MMOL/L — CRITICAL HIGH (ref 0.5–2)
LACTATE SERPL-SCNC: 7.4 MMOL/L — CRITICAL HIGH (ref 0.5–2)
LDH SERPL L TO P-CCNC: 1210 U/L — SIGNIFICANT CHANGE UP
LYMPHOCYTES # BLD AUTO: 0.38 K/UL — LOW (ref 1–3.3)
LYMPHOCYTES # BLD AUTO: 0.41 K/UL — LOW (ref 1–3.3)
LYMPHOCYTES # BLD AUTO: 1.7 % — LOW (ref 13–44)
LYMPHOCYTES # BLD AUTO: 6.1 % — LOW (ref 13–44)
LYMPHOCYTES # FLD: 6 % — SIGNIFICANT CHANGE UP
MAGNESIUM SERPL-MCNC: 2.3 MG/DL — SIGNIFICANT CHANGE UP (ref 1.6–2.6)
MAGNESIUM SERPL-MCNC: 2.4 MG/DL — SIGNIFICANT CHANGE UP (ref 1.6–2.6)
MANUAL SMEAR VERIFICATION: SIGNIFICANT CHANGE UP
MCHC RBC-ENTMCNC: 25.8 PG — LOW (ref 27–34)
MCHC RBC-ENTMCNC: 25.8 PG — LOW (ref 27–34)
MCHC RBC-ENTMCNC: 31.2 GM/DL — LOW (ref 32–36)
MCHC RBC-ENTMCNC: 32.9 GM/DL — SIGNIFICANT CHANGE UP (ref 32–36)
MCV RBC AUTO: 78.5 FL — LOW (ref 80–100)
MCV RBC AUTO: 82.6 FL — SIGNIFICANT CHANGE UP (ref 80–100)
MESOTHL CELL # FLD: 0 % — SIGNIFICANT CHANGE UP
MICROCYTES BLD QL: SIGNIFICANT CHANGE UP
MONOCYTES # BLD AUTO: 0.2 K/UL — SIGNIFICANT CHANGE UP (ref 0–0.9)
MONOCYTES # BLD AUTO: 0.33 K/UL — SIGNIFICANT CHANGE UP (ref 0–0.9)
MONOCYTES NFR BLD AUTO: 0.9 % — LOW (ref 2–14)
MONOCYTES NFR BLD AUTO: 4.9 % — SIGNIFICANT CHANGE UP (ref 2–14)
MONOS+MACROS # FLD: 15 % — SIGNIFICANT CHANGE UP
MYELOCYTES NFR BLD: 2.6 % — HIGH (ref 0–0)
NEUTROPHILS # BLD AUTO: 21.42 K/UL — HIGH (ref 1.8–7.4)
NEUTROPHILS # BLD AUTO: 5.8 K/UL — SIGNIFICANT CHANGE UP (ref 1.8–7.4)
NEUTROPHILS NFR BLD AUTO: 87.2 % — HIGH (ref 43–77)
NEUTROPHILS NFR BLD AUTO: 89.6 % — HIGH (ref 43–77)
NEUTROPHILS-BODY FLUID: 79 % — SIGNIFICANT CHANGE UP
NEUTS BAND # BLD: 5.2 % — SIGNIFICANT CHANGE UP (ref 0–6)
NRBC # BLD: 2 /100 WBCS — HIGH (ref 0–0)
NRBC # BLD: 3 /100 — HIGH (ref 0–0)
NRBC # FLD: 0.17 K/UL — HIGH (ref 0–0)
OTHER CELLS FLD MANUAL: 0 % — SIGNIFICANT CHANGE UP
OVALOCYTES BLD QL SMEAR: SLIGHT — SIGNIFICANT CHANGE UP
PHOSPHATE SERPL-MCNC: 5.4 MG/DL — HIGH (ref 2.5–4.5)
PHOSPHATE SERPL-MCNC: 7.3 MG/DL — HIGH (ref 2.5–4.5)
PLAT MORPH BLD: NORMAL — SIGNIFICANT CHANGE UP
PLATELET # BLD AUTO: 228 K/UL — SIGNIFICANT CHANGE UP (ref 150–400)
PLATELET # BLD AUTO: 244 K/UL — SIGNIFICANT CHANGE UP (ref 150–400)
PLATELET COUNT - ESTIMATE: NORMAL — SIGNIFICANT CHANGE UP
POIKILOCYTOSIS BLD QL AUTO: SLIGHT — SIGNIFICANT CHANGE UP
POTASSIUM SERPL-MCNC: 3.6 MMOL/L — SIGNIFICANT CHANGE UP (ref 3.5–5.3)
POTASSIUM SERPL-MCNC: 4.9 MMOL/L — SIGNIFICANT CHANGE UP (ref 3.5–5.3)
POTASSIUM SERPL-SCNC: 3.6 MMOL/L — SIGNIFICANT CHANGE UP (ref 3.5–5.3)
POTASSIUM SERPL-SCNC: 4.9 MMOL/L — SIGNIFICANT CHANGE UP (ref 3.5–5.3)
PROT FLD-MCNC: 6.4 G/DL — SIGNIFICANT CHANGE UP
PROT SERPL-MCNC: 6.3 G/DL — SIGNIFICANT CHANGE UP (ref 6–8.3)
PROT SERPL-MCNC: 6.9 G/DL — SIGNIFICANT CHANGE UP (ref 6–8.3)
PROTHROM AB SERPL-ACNC: 17.3 SEC — HIGH (ref 9.5–13)
RBC # BLD: 2.87 M/UL — LOW (ref 3.8–5.2)
RBC # BLD: 2.98 M/UL — LOW (ref 3.8–5.2)
RBC # FLD: 15.4 % — HIGH (ref 10.3–14.5)
RBC # FLD: 15.9 % — HIGH (ref 10.3–14.5)
RBC BLD AUTO: NORMAL — SIGNIFICANT CHANGE UP
RCV VOL RI: HIGH CELLS/UL (ref 0–5)
SODIUM SERPL-SCNC: 136 MMOL/L — SIGNIFICANT CHANGE UP (ref 135–145)
SODIUM SERPL-SCNC: 142 MMOL/L — SIGNIFICANT CHANGE UP (ref 135–145)
TOTAL CELLS COUNTED, BODY FLUID: 100 CELLS — SIGNIFICANT CHANGE UP
TOTAL NUCLEATED CELL COUNT, BODY FLUID: 3997 CELLS/UL — HIGH (ref 0–5)
TROPONIN T, HIGH SENSITIVITY RESULT: 61 NG/L — CRITICAL HIGH
TUBE TYPE: SIGNIFICANT CHANGE UP
WBC # BLD: 22.6 K/UL — HIGH (ref 3.8–10.5)
WBC # BLD: 6.67 K/UL — SIGNIFICANT CHANGE UP (ref 3.8–10.5)
WBC # FLD AUTO: 22.6 K/UL — HIGH (ref 3.8–10.5)
WBC # FLD AUTO: 6.67 K/UL — SIGNIFICANT CHANGE UP (ref 3.8–10.5)

## 2023-09-24 PROCEDURE — 36569 INSJ PICC 5 YR+ W/O IMAGING: CPT | Mod: GC

## 2023-09-24 PROCEDURE — 93321 DOPPLER ECHO F-UP/LMTD STD: CPT | Mod: 26

## 2023-09-24 PROCEDURE — 93308 TTE F-UP OR LMTD: CPT | Mod: 26,GC

## 2023-09-24 PROCEDURE — 99291 CRITICAL CARE FIRST HOUR: CPT | Mod: GC,25

## 2023-09-24 PROCEDURE — 99233 SBSQ HOSP IP/OBS HIGH 50: CPT | Mod: GC

## 2023-09-24 PROCEDURE — 76604 US EXAM CHEST: CPT | Mod: 26,GC

## 2023-09-24 PROCEDURE — 71045 X-RAY EXAM CHEST 1 VIEW: CPT | Mod: 26

## 2023-09-24 PROCEDURE — 99292 CRITICAL CARE ADDL 30 MIN: CPT | Mod: GC,25

## 2023-09-24 PROCEDURE — 74174 CTA ABD&PLVS W/CONTRAST: CPT | Mod: 26

## 2023-09-24 PROCEDURE — 36620 INSERTION CATHETER ARTERY: CPT | Mod: GC

## 2023-09-24 PROCEDURE — 71260 CT THORAX DX C+: CPT | Mod: 26

## 2023-09-24 RX ORDER — PIPERACILLIN AND TAZOBACTAM 4; .5 G/20ML; G/20ML
3.38 INJECTION, POWDER, LYOPHILIZED, FOR SOLUTION INTRAVENOUS EVERY 12 HOURS
Refills: 0 | Status: DISCONTINUED | OUTPATIENT
Start: 2023-09-25 | End: 2023-10-01

## 2023-09-24 RX ORDER — DEXTROSE 50 % IN WATER 50 %
50 SYRINGE (ML) INTRAVENOUS ONCE
Refills: 0 | Status: COMPLETED | OUTPATIENT
Start: 2023-09-24 | End: 2023-09-24

## 2023-09-24 RX ORDER — PHENYLEPHRINE HYDROCHLORIDE 10 MG/ML
0.05 INJECTION INTRAVENOUS
Qty: 160 | Refills: 0 | Status: DISCONTINUED | OUTPATIENT
Start: 2023-09-24 | End: 2023-09-25

## 2023-09-24 RX ORDER — NOREPINEPHRINE BITARTRATE/D5W 8 MG/250ML
0.05 PLASTIC BAG, INJECTION (ML) INTRAVENOUS
Qty: 8 | Refills: 0 | Status: DISCONTINUED | OUTPATIENT
Start: 2023-09-24 | End: 2023-09-24

## 2023-09-24 RX ORDER — PIPERACILLIN AND TAZOBACTAM 4; .5 G/20ML; G/20ML
3.38 INJECTION, POWDER, LYOPHILIZED, FOR SOLUTION INTRAVENOUS ONCE
Refills: 0 | Status: COMPLETED | OUTPATIENT
Start: 2023-09-24 | End: 2023-09-24

## 2023-09-24 RX ORDER — ONDANSETRON 8 MG/1
4 TABLET, FILM COATED ORAL ONCE
Refills: 0 | Status: COMPLETED | OUTPATIENT
Start: 2023-09-24 | End: 2023-09-24

## 2023-09-24 RX ORDER — PHENYLEPHRINE HYDROCHLORIDE 10 MG/ML
1 INJECTION INTRAVENOUS
Qty: 40 | Refills: 0 | Status: DISCONTINUED | OUTPATIENT
Start: 2023-09-24 | End: 2023-09-24

## 2023-09-24 RX ORDER — HYDROCORTISONE 20 MG
50 TABLET ORAL EVERY 6 HOURS
Refills: 0 | Status: DISCONTINUED | OUTPATIENT
Start: 2023-09-24 | End: 2023-09-24

## 2023-09-24 RX ORDER — VANCOMYCIN HCL 1 G
1000 VIAL (EA) INTRAVENOUS ONCE
Refills: 0 | Status: COMPLETED | OUTPATIENT
Start: 2023-09-24 | End: 2023-09-24

## 2023-09-24 RX ORDER — SODIUM CHLORIDE 9 MG/ML
1000 INJECTION INTRAMUSCULAR; INTRAVENOUS; SUBCUTANEOUS ONCE
Refills: 0 | Status: COMPLETED | OUTPATIENT
Start: 2023-09-24 | End: 2023-09-24

## 2023-09-24 RX ORDER — SODIUM BICARBONATE 1 MEQ/ML
50 SYRINGE (ML) INTRAVENOUS ONCE
Refills: 0 | Status: COMPLETED | OUTPATIENT
Start: 2023-09-24 | End: 2023-09-24

## 2023-09-24 RX ORDER — LANOLIN ALCOHOL/MO/W.PET/CERES
5 CREAM (GRAM) TOPICAL AT BEDTIME
Refills: 0 | Status: DISCONTINUED | OUTPATIENT
Start: 2023-09-24 | End: 2023-09-24

## 2023-09-24 RX ORDER — PIPERACILLIN AND TAZOBACTAM 4; .5 G/20ML; G/20ML
2.25 INJECTION, POWDER, LYOPHILIZED, FOR SOLUTION INTRAVENOUS ONCE
Refills: 0 | Status: DISCONTINUED | OUTPATIENT
Start: 2023-09-24 | End: 2023-09-24

## 2023-09-24 RX ORDER — NOREPINEPHRINE BITARTRATE/D5W 8 MG/250ML
0.05 PLASTIC BAG, INJECTION (ML) INTRAVENOUS
Qty: 16 | Refills: 0 | Status: DISCONTINUED | OUTPATIENT
Start: 2023-09-24 | End: 2023-09-25

## 2023-09-24 RX ORDER — NOREPINEPHRINE BITARTRATE/D5W 8 MG/250ML
0.05 PLASTIC BAG, INJECTION (ML) INTRAVENOUS
Qty: 16 | Refills: 0 | Status: DISCONTINUED | OUTPATIENT
Start: 2023-09-24 | End: 2023-09-24

## 2023-09-24 RX ORDER — HYDROCORTISONE 20 MG
50 TABLET ORAL EVERY 6 HOURS
Refills: 0 | Status: DISCONTINUED | OUTPATIENT
Start: 2023-09-24 | End: 2023-09-26

## 2023-09-24 RX ORDER — PIPERACILLIN AND TAZOBACTAM 4; .5 G/20ML; G/20ML
3.38 INJECTION, POWDER, LYOPHILIZED, FOR SOLUTION INTRAVENOUS ONCE
Refills: 0 | Status: DISCONTINUED | OUTPATIENT
Start: 2023-09-24 | End: 2023-09-24

## 2023-09-24 RX ORDER — VASOPRESSIN 20 [USP'U]/ML
0.04 INJECTION INTRAVENOUS
Qty: 40 | Refills: 0 | Status: DISCONTINUED | OUTPATIENT
Start: 2023-09-24 | End: 2023-09-25

## 2023-09-24 RX ADMIN — Medication 50 MILLIGRAM(S): at 16:29

## 2023-09-24 RX ADMIN — ONDANSETRON 4 MILLIGRAM(S): 8 TABLET, FILM COATED ORAL at 11:37

## 2023-09-24 RX ADMIN — PANTOPRAZOLE SODIUM 40 MILLIGRAM(S): 20 TABLET, DELAYED RELEASE ORAL at 06:03

## 2023-09-24 RX ADMIN — SODIUM CHLORIDE 1000 MILLILITER(S): 9 INJECTION INTRAMUSCULAR; INTRAVENOUS; SUBCUTANEOUS at 10:05

## 2023-09-24 RX ADMIN — Medication 1: at 18:28

## 2023-09-24 RX ADMIN — Medication 50 MILLILITER(S): at 11:39

## 2023-09-24 RX ADMIN — Medication 3: at 23:28

## 2023-09-24 RX ADMIN — PHENYLEPHRINE HYDROCHLORIDE 0.57 MICROGRAM(S)/KG/MIN: 10 INJECTION INTRAVENOUS at 21:37

## 2023-09-24 RX ADMIN — Medication 2.85 MICROGRAM(S)/KG/MIN: at 21:37

## 2023-09-24 RX ADMIN — Medication 650 MILLIGRAM(S): at 06:02

## 2023-09-24 RX ADMIN — Medication 650 MILLIGRAM(S): at 23:27

## 2023-09-24 RX ADMIN — PIPERACILLIN AND TAZOBACTAM 200 GRAM(S): 4; .5 INJECTION, POWDER, LYOPHILIZED, FOR SOLUTION INTRAVENOUS at 16:53

## 2023-09-24 RX ADMIN — HEPARIN SODIUM 5000 UNIT(S): 5000 INJECTION INTRAVENOUS; SUBCUTANEOUS at 05:19

## 2023-09-24 RX ADMIN — Medication 50 MILLIGRAM(S): at 23:27

## 2023-09-24 RX ADMIN — Medication 100 MILLIGRAM(S): at 06:02

## 2023-09-24 RX ADMIN — CHLORHEXIDINE GLUCONATE 1 APPLICATION(S): 213 SOLUTION TOPICAL at 05:19

## 2023-09-24 RX ADMIN — Medication 1: at 11:38

## 2023-09-24 RX ADMIN — ISOSORBIDE DINITRATE 10 MILLIGRAM(S): 5 TABLET ORAL at 06:02

## 2023-09-24 RX ADMIN — PIPERACILLIN AND TAZOBACTAM 25 GRAM(S): 4; .5 INJECTION, POWDER, LYOPHILIZED, FOR SOLUTION INTRAVENOUS at 23:41

## 2023-09-24 RX ADMIN — HEPARIN SODIUM 5000 UNIT(S): 5000 INJECTION INTRAVENOUS; SUBCUTANEOUS at 23:27

## 2023-09-24 RX ADMIN — PHENYLEPHRINE HYDROCHLORIDE 0.57 MICROGRAM(S)/KG/MIN: 10 INJECTION INTRAVENOUS at 16:30

## 2023-09-24 RX ADMIN — Medication 125 MICROGRAM(S): at 05:19

## 2023-09-24 RX ADMIN — Medication 1: at 16:46

## 2023-09-24 RX ADMIN — Medication 250 MILLIGRAM(S): at 16:54

## 2023-09-24 RX ADMIN — VASOPRESSIN 6 UNIT(S)/MIN: 20 INJECTION INTRAVENOUS at 21:38

## 2023-09-24 RX ADMIN — Medication 2.85 MICROGRAM(S)/KG/MIN: at 16:30

## 2023-09-24 RX ADMIN — Medication 50 MILLIEQUIVALENT(S): at 19:40

## 2023-09-24 RX ADMIN — ATORVASTATIN CALCIUM 40 MILLIGRAM(S): 80 TABLET, FILM COATED ORAL at 23:27

## 2023-09-24 NOTE — PROGRESS NOTE ADULT - ASSESSMENT
ASSESSMENT  Ms. Hernandez is a 57 yo F w/ HTN, HLD, DM2, CKD (stage 3-4), hypothyroidism (c/b myxedema coma in past), severe pulmonary hypertension, mod-severe TR, asthma, HFpEF, presents for progressive dyspnea at rest with left sided chest tightness, found to be in ADHF, admitted to MICU for urgent HD due to acidosis and volume OL, anuric on bumex drip    PLAN  =====Neurologic=====  No active issues    =====Pulmonary=====  #Dyspnea   Plan: Acute on chronic right sided CHF vs progressive renal failure vs PE (will need to r/o with PE, but given kidney dysfunction will do dopplers of LE first to r/o DVT given high risk of ESRD with contrast).   -STAT duplex of lower extremity reordered to r/o DVT  -troponin 65->64  -pro-BNP 2092   -RVP negative   Most recent TTE (8/6) showing HFpEF with elevated RVSP and RV dilation.   -TTE ordered   - escalated to bumex drip, poor UOP  - Currently on 3L NC, kai    =====Cardiovascular=====  #Acute on chronic diastolic congestive heart failure  - Last EF 8/2023 69%  - Bumex gtt 2mg/hr->Urgent HD for volume removal due to anuria  - Strict I/O  - Daily standing weights  - Monitor lytes replete K>4.0 and Mg>2.0   - Trend SCr  - HF following, appreciate recommendations    #HTN  - Hydralazine. Isosorbide dinitrate, Nifedipine    =====GI=====  #GERD  - Protonix 40mg IV QD    #Diet  - NPO except medications    =====Renal/=====  #Electrolytes  - Maintain K>4, Phos>3, Mag>2, iCal>1    #Hyponatremia  - Pt. with hypervolemia hyponatremia.   - Last SNa low at 121.   - Pt. initiated on IV Bumex infusion for hypervolemia management->Urgent HD  - fluid restriction (<1 L/day).   - Monitor SNa q8 hrs.   - Avoid overcorrection of SNa.    #Acute renal failure superimposed on chronic kidney disease.   - Pt with CKD 3-4, baseline cr approximately 2, now with ISAMAR on CKD  - Monitor renal function/UOP, avoid nephrotoxins  - Sodium Bicarb Tabs  - urgent HD today per nephro for volume overload and anuria,     #Metabolic acidosis  Pt. with metabolic acidosis in the setting of volume overload and renal failure. pH is low at 7.19, and SCO2 decreased to 14 today.   - Plan to initiate HD, as outlined above.   - Monitor SCO2    =====Endocrine=====  #DM2  - While NPO, FSBG and ADRIAN q6h    #Hypothyroidism  - Synthroid 125 mcg daily    =====Infectious Disease=====  Pt hypothermic on oral and rectal temp (unable to obtain temp on thermometer)  - f/u blood cultures, UA    =====Heme/Onc=====  #DVT Ppx  - heparin 5000 subQ    #Anemia  - Hgb 7-8, not clinically bleeding. Likely in s/o renal disease.   - CTM, maintain Hgb >7    =====Ethics=====  FULL CODE   ASSESSMENT  Ms. Hernandez is a 59 yo F w/ HTN, HLD, DM2, CKD (stage 3-4), hypothyroidism (c/b myxedema coma in past), severe pulmonary hypertension, mod-severe TR, asthma, HFpEF, presents for progressive dyspnea at rest with left sided chest tightness, found to be in ADHF, admitted to MICU for urgent HD due to acidosis and volume OL, anuric on bumex drip    PLAN  =====Neurologic=====  No active issues    =====Pulmonary=====  #Dyspnea   Plan: Acute on chronic right sided CHF vs progressive renal failure vs PE (will need to r/o with PE, but given kidney dysfunction will do dopplers of LE first to r/o DVT given high risk of ESRD with contrast).   -STAT duplex of lower extremity reordered to r/o DVT  -troponin 65->64  -pro-BNP 2092   -RVP negative   Most recent TTE (8/6) showing HFpEF with elevated RVSP and RV dilation.   -TTE ordered   - escalated to bumex drip, poor UOP  - Currently on 3L NC, kai    =====Cardiovascular=====  #Cardiogenic shock  - i/s/o pericardial effusion   - started levo, kai  - gave 1L IVF  - consulted cards, urgent TTE performed, f/u read + recs    #Acute on chronic diastolic congestive heart failure  - Last EF 8/2023 69%  - Bumex gtt 2mg/hr->Urgent HD for volume removal due to anuria  - Strict I/O  - Daily standing weights  - Monitor lytes replete K>4.0 and Mg>2.0   - Trend SCr  - HF following, appreciate recommendations    #HTN  - Hydralazine. Isosorbide dinitrate, Nifedipine    =====GI=====  #GERD  - Protonix 40mg IV QD    #Nausea   - i/s/o kidney injury, rising lactate  - zofran PRN    #Diet  - CC diet w/ volume restriction    =====Renal/=====  #Electrolytes  - Maintain K>4, Phos>3, Mag>2, iCal>1    #Hyponatremia  - Pt. with hypervolemia hyponatremia.   - Last SNa low at 121.   - Pt. initiated on IV Bumex infusion for hypervolemia management->Urgent HD  - fluid restriction (<1 L/day).   - Monitor SNa q8 hrs.   - Avoid overcorrection of Na. (4-6/ 24hr)    #Acute renal failure superimposed on chronic kidney disease.   - Pt with CKD 3-4, baseline cr approximately 2, now with ISAMAR on CKD  - Monitor renal function/UOP, avoid nephrotoxins  - Sodium Bicarb Tabs  - urgent HD today per nephro for volume overload and anuria,     #Metabolic acidosis  Pt. with metabolic acidosis in the setting of volume overload and renal failure. pH is low at 7.19, and SCO2 decreased to 14 today.   - iHD per nephro, as outlined above.   - Monitor SCO2    =====Endocrine=====  #DM2  - FSBG and ADRIAN     #Hypothyroidism  - Synthroid 125 mcg daily    =====Infectious Disease=====  Pt hypothermic on oral and rectal temp (unable to obtain temp on thermometer)  - f/u blood cultures, UA  - NGTD    =====Heme/Onc=====  #DVT Ppx  - heparin 5000 subQ    #Anemia  - Hgb 7-8, not clinically bleeding. Likely in s/o renal disease.   - CTM, maintain Hgb >7    =====Ethics=====  FULL CODE

## 2023-09-24 NOTE — CHART NOTE - NSCHARTNOTEFT_GEN_A_CORE
MICU Transfer Note  ---------------------------    Transfer from: MICU  Transfer to:  ( x ) Medicine    (  ) Telemetry    (  ) RCU    (  ) Palliative    (  ) Stroke Unit    (  ) _______________  Accepting Physician:     SAMSON   Ms. Hernandez is a 59 yo F w/ HTN, HLD, DM2, CKD (stage 3-4), hypothyroidism (c/b myxedema coma in past), severe pulmonary hypertension, mod-severe TR, asthma, HFpEF, presents for progressive dyspnea at rest with left sided chest tightness. She reports SOB on exertion as well. She reports over last few days she has had non-productive cough, decrease PO intake, nausea this morning, and diarrhea (without blood) this morning. She was placed on BIPAP in the ED. CXR was sig for small bilateral pleural effusions. Labs were sig for the following: BUN/Cr 110/3.92, Na 122 (last 133 in August), CO2 15, AG 20, -290, d-dimer 3509, WBC 9.19, Hb 8.9/26.7, (8.2/1245), MCV 75.4, RDW 14.6, neutrophil count 87.1%, alk phos 1093, pro-BNP 2092, LA 0.9, pCO2 36.     Most recent TTE (8/8) showing HFpEF with elevated RVSP and RV dilation.   Most recent RHC (8/28): RA 17 mmHg. PA 51/22/36 mmHg. PCW 25 mmHg. CO 6.69 L/min with CI 4.25 L/min/m2 (Hgb 6.8). SVR 89    MICU COURSE  MICU consulted for urgent dialysis catheter (shiley) placement and HD i/s/o anuria and rising SCr and hypervolemia. During MICU stay, shiley placed and patient started on HD w/ 1L removal on 9/22 and 9/23. S/p HD, patient's labs and mentation remarkably improved. TTE remarkable for large pericardial effusion w/o tamponade physiology; LE duplex ordered to r/o DVT.      To-Do:  [ ] F/u Nephrology recs   [ ] F/u HF recs   [ ] F/u LE Duplex to r/o DVT   [ ] F/u Blood cx (9/22 - NGTD)    OBJECTIVE --  Vital Signs Last 24 Hrs  T(C): 35.8 (23 Sep 2023 23:00), Max: 35.8 (23 Sep 2023 23:00)  T(F): 96.5 (23 Sep 2023 23:00), Max: 96.5 (23 Sep 2023 23:00)  HR: 84 (24 Sep 2023 02:00) (67 - 84)  BP: 115/78 (24 Sep 2023 02:00) (103/72 - 150/76)  BP(mean): 83 (24 Sep 2023 02:00) (81 - 111)  RR: 20 (24 Sep 2023 02:00) (12 - 26)  SpO2: 93% (24 Sep 2023 02:00) (92% - 100%)    Parameters below as of 24 Sep 2023 00:00  Patient On (Oxygen Delivery Method): nasal cannula  O2 Flow (L/min): 2      I&O's Summary    22 Sep 2023 07:01  -  23 Sep 2023 07:00  --------------------------------------------------------  IN: 500 mL / OUT: 2105 mL / NET: -1605 mL    23 Sep 2023 07:01  -  24 Sep 2023 03:41  --------------------------------------------------------  IN: 728 mL / OUT: 1500 mL / NET: -772 mL        MEDICATIONS  (STANDING):  atorvastatin 40 milliGRAM(s) Oral at bedtime  chlorhexidine 2% Cloths 1 Application(s) Topical <User Schedule>  dextrose 5%. 1000 milliLiter(s) (100 mL/Hr) IV Continuous <Continuous>  dextrose 5%. 1000 milliLiter(s) (50 mL/Hr) IV Continuous <Continuous>  dextrose 50% Injectable 25 Gram(s) IV Push once  dextrose 50% Injectable 25 Gram(s) IV Push once  dextrose 50% Injectable 12.5 Gram(s) IV Push once  glucagon  Injectable 1 milliGRAM(s) IntraMuscular once  heparin   Injectable 5000 Unit(s) SubCutaneous every 8 hours  hydrALAZINE 100 milliGRAM(s) Oral three times a day  insulin lispro (ADMELOG) corrective regimen sliding scale   SubCutaneous three times a day before meals  insulin lispro (ADMELOG) corrective regimen sliding scale   SubCutaneous at bedtime  isosorbide   dinitrate Tablet (ISORDIL) 10 milliGRAM(s) Oral three times a day  levothyroxine 125 MICROGram(s) Oral daily  NIFEdipine XL 60 milliGRAM(s) Oral daily  pantoprazole    Tablet 40 milliGRAM(s) Oral before breakfast  sodium bicarbonate 650 milliGRAM(s) Oral three times a day    MEDICATIONS  (PRN):  dextrose Oral Gel 15 Gram(s) Oral once PRN Blood Glucose LESS THAN 70 milliGRAM(s)/deciliter  melatonin 3 milliGRAM(s) Oral at bedtime PRN Insomnia        LABS                                            7.7                   Neurophils% (auto):   87.2   (09-24 @ 02:28):    6.67 )-----------(228          Lymphocytes% (auto):  6.1                                           23.4                   Eosinphils% (auto):   0.4      Manual%: Neutrophils x    ; Lymphocytes x    ; Eosinophils x    ; Bands%: x    ; Blasts x                                    136    |  94     |  52                  Calcium: 8.8   / iCa: 1.03   (09-24 @ 02:28)    ----------------------------<  144       Magnesium: 2.40                             4.9     |  19     |  2.58             Phosphorous: 5.4      TPro  6.9    /  Alb  3.7    /  TBili  1.0    /  DBili  x      /  AST  63     /  ALT  32     /  AlkPhos  900    24 Sep 2023 02:28    ( 09-24 @ 02:28 )   PT: 17.3 sec;   INR: 1.56 ratio  aPTT: 73.9 sec          ASSESSMENT & PLAN:   Ms. Hernandez is a 59 yo F w/ HTN, HLD, DM2, CKD (stage 3-4), hypothyroidism (c/b myxedema coma in past), severe pulmonary hypertension, mod-severe TR, asthma, HFpEF, presents for progressive dyspnea at rest with left sided chest tightness, found to be in ADHF, admitted to MICU for urgent HD due to acidosis and volume OL, anuric on bumex drip    PLAN  =====Neurologic=====  No active issues    =====Pulmonary=====  #Dyspnea   Plan: Acute on chronic right sided CHF vs progressive renal failure vs PE (will need to r/o with PE, but given kidney dysfunction will do dopplers of LE first to r/o DVT given high risk of ESRD with contrast).   -STAT duplex of lower extremity reordered to r/o DVT  -troponin 65->64  -pro-BNP 2092   -RVP negative   Most recent TTE (8/6) showing HFpEF with elevated RVSP and RV dilation.   -TTE ordered   - escalated to bumex drip, poor UOP  - Currently on 3L NC, kai    =====Cardiovascular=====  #Acute on chronic diastolic congestive heart failure  - Last EF 8/2023 69%  - Bumex gtt 2mg/hr->Urgent HD for volume removal due to anuria  - Strict I/O  - Daily standing weights  - Monitor lytes replete K>4.0 and Mg>2.0   - Trend SCr  - HF following, appreciate recommendations    #HTN  - Hydralazine. Isosorbide dinitrate, Nifedipine    =====GI=====  #GERD  - Protonix 40mg IV QD    #Diet  - NPO except medications    =====Renal/=====  #Electrolytes  - Maintain K>4, Phos>3, Mag>2, iCal>1    #Hyponatremia  - Pt. with hypervolemia hyponatremia.   - Last SNa low at 121.   - Pt. initiated on IV Bumex infusion for hypervolemia management->Urgent HD  - fluid restriction (<1 L/day).   - Monitor SNa q8 hrs.   - Avoid overcorrection of SNa.    #Acute renal failure superimposed on chronic kidney disease.   - Pt with CKD 3-4, baseline cr approximately 2, now with ISAMAR on CKD  - Monitor renal function/UOP, avoid nephrotoxins  - Sodium Bicarb Tabs  - urgent HD today per nephro for volume overload and anuria,     #Metabolic acidosis  Pt. with metabolic acidosis in the setting of volume overload and renal failure. pH is low at 7.19, and SCO2 decreased to 14 today.   - Plan to initiate HD, as outlined above.   - Monitor SCO2    =====Endocrine=====  #DM2  - While NPO, FSBG and ADRIAN q6h    #Hypothyroidism  - Synthroid 125 mcg daily    =====Infectious Disease=====  Pt hypothermic on oral and rectal temp (unable to obtain temp on thermometer)  - f/u blood cultures, UA    =====Heme/Onc=====  #DVT Ppx  - heparin 5000 subQ    #Anemia  - Hgb 7-8, not clinically bleeding. Likely in s/o renal disease.   - CTM, maintain Hgb >7    =====Ethics=====  FULL CODE MICU Transfer Note  ---------------------------    Transfer from: MICU  Transfer to:  ( x ) Medicine    (  ) Telemetry    (  ) RCU    (  ) Palliative    (  ) Stroke Unit    (  ) _______________  Accepting Physician:     SAMSON   Ms. Hernandez is a 57 yo F w/ HTN, HLD, DM2, CKD (stage 3-4), hypothyroidism (c/b myxedema coma in past), severe pulmonary hypertension, mod-severe TR, asthma, HFpEF, presents for progressive dyspnea at rest with left sided chest tightness. She reports SOB on exertion as well. She reports over last few days she has had non-productive cough, decrease PO intake, nausea this morning, and diarrhea (without blood) this morning. She was placed on BIPAP in the ED. CXR was sig for small bilateral pleural effusions. Labs were sig for the following: BUN/Cr 110/3.92, Na 122 (last 133 in August), CO2 15, AG 20, -290, d-dimer 3509, WBC 9.19, Hb 8.9/26.7, (8.2/1245), MCV 75.4, RDW 14.6, neutrophil count 87.1%, alk phos 1093, pro-BNP 2092, LA 0.9, pCO2 36.     Most recent TTE (8/8) showing HFpEF with elevated RVSP and RV dilation.   Most recent RHC (8/28): RA 17 mmHg. PA 51/22/36 mmHg. PCW 25 mmHg. CO 6.69 L/min with CI 4.25 L/min/m2 (Hgb 6.8). SVR 89    MICU COURSE  MICU consulted for urgent dialysis catheter (shiley) placement and HD i/s/o anuria and rising SCr and hypervolemia. During MICU stay, shiley placed and patient started on HD w/ 1L removal on 9/22 and 9/23. S/p HD, patient's labs and mentation remarkably improved. TTE remarkable for large pericardial effusion w/o tamponade physiology; LE duplex ordered to r/o DVT.      To-Do:  [ ] F/u Nephrology recs   [ ] F/u HF recs  [ ] F/u Lactate   [ ] F/u LE Duplex to r/o DVT   [ ] F/u Blood cx (9/22 - NGTD)    OBJECTIVE --  Vital Signs Last 24 Hrs  T(C): 35.8 (23 Sep 2023 23:00), Max: 35.8 (23 Sep 2023 23:00)  T(F): 96.5 (23 Sep 2023 23:00), Max: 96.5 (23 Sep 2023 23:00)  HR: 84 (24 Sep 2023 02:00) (67 - 84)  BP: 115/78 (24 Sep 2023 02:00) (103/72 - 150/76)  BP(mean): 83 (24 Sep 2023 02:00) (81 - 111)  RR: 20 (24 Sep 2023 02:00) (12 - 26)  SpO2: 93% (24 Sep 2023 02:00) (92% - 100%)    Parameters below as of 24 Sep 2023 00:00  Patient On (Oxygen Delivery Method): nasal cannula  O2 Flow (L/min): 2      I&O's Summary    22 Sep 2023 07:01  -  23 Sep 2023 07:00  --------------------------------------------------------  IN: 500 mL / OUT: 2105 mL / NET: -1605 mL    23 Sep 2023 07:01  -  24 Sep 2023 03:41  --------------------------------------------------------  IN: 728 mL / OUT: 1500 mL / NET: -772 mL        MEDICATIONS  (STANDING):  atorvastatin 40 milliGRAM(s) Oral at bedtime  chlorhexidine 2% Cloths 1 Application(s) Topical <User Schedule>  dextrose 5%. 1000 milliLiter(s) (100 mL/Hr) IV Continuous <Continuous>  dextrose 5%. 1000 milliLiter(s) (50 mL/Hr) IV Continuous <Continuous>  dextrose 50% Injectable 25 Gram(s) IV Push once  dextrose 50% Injectable 25 Gram(s) IV Push once  dextrose 50% Injectable 12.5 Gram(s) IV Push once  glucagon  Injectable 1 milliGRAM(s) IntraMuscular once  heparin   Injectable 5000 Unit(s) SubCutaneous every 8 hours  hydrALAZINE 100 milliGRAM(s) Oral three times a day  insulin lispro (ADMELOG) corrective regimen sliding scale   SubCutaneous three times a day before meals  insulin lispro (ADMELOG) corrective regimen sliding scale   SubCutaneous at bedtime  isosorbide   dinitrate Tablet (ISORDIL) 10 milliGRAM(s) Oral three times a day  levothyroxine 125 MICROGram(s) Oral daily  NIFEdipine XL 60 milliGRAM(s) Oral daily  pantoprazole    Tablet 40 milliGRAM(s) Oral before breakfast  sodium bicarbonate 650 milliGRAM(s) Oral three times a day    MEDICATIONS  (PRN):  dextrose Oral Gel 15 Gram(s) Oral once PRN Blood Glucose LESS THAN 70 milliGRAM(s)/deciliter  melatonin 3 milliGRAM(s) Oral at bedtime PRN Insomnia        LABS                                            7.7                   Neurophils% (auto):   87.2   (09-24 @ 02:28):    6.67 )-----------(228          Lymphocytes% (auto):  6.1                                           23.4                   Eosinphils% (auto):   0.4      Manual%: Neutrophils x    ; Lymphocytes x    ; Eosinophils x    ; Bands%: x    ; Blasts x                                    136    |  94     |  52                  Calcium: 8.8   / iCa: 1.03   (09-24 @ 02:28)    ----------------------------<  144       Magnesium: 2.40                             4.9     |  19     |  2.58             Phosphorous: 5.4      TPro  6.9    /  Alb  3.7    /  TBili  1.0    /  DBili  x      /  AST  63     /  ALT  32     /  AlkPhos  900    24 Sep 2023 02:28    ( 09-24 @ 02:28 )   PT: 17.3 sec;   INR: 1.56 ratio  aPTT: 73.9 sec          ASSESSMENT & PLAN:   Ms. Hernandez is a 57 yo F w/ HTN, HLD, DM2, CKD (stage 3-4), hypothyroidism (c/b myxedema coma in past), severe pulmonary hypertension, mod-severe TR, asthma, HFpEF, presents for progressive dyspnea at rest with left sided chest tightness, found to be in ADHF, admitted to MICU for urgent HD due to acidosis and volume OL, anuric on bumex drip    PLAN  =====Neurologic=====  No active issues    =====Pulmonary=====  #Dyspnea   Plan: Acute on chronic right sided CHF vs progressive renal failure vs PE (will need to r/o with PE, but given kidney dysfunction will do dopplers of LE first to r/o DVT given high risk of ESRD with contrast).   -STAT duplex of lower extremity reordered to r/o DVT  -troponin 65->64  -pro-BNP 2092   -RVP negative   Most recent TTE (8/6) showing HFpEF with elevated RVSP and RV dilation.   -TTE ordered   - escalated to bumex drip, poor UOP  - Currently on 3L NC, kai    =====Cardiovascular=====  #Acute on chronic diastolic congestive heart failure  - Last EF 8/2023 69%  - Bumex gtt 2mg/hr->Urgent HD for volume removal due to anuria  - Strict I/O  - Daily standing weights  - Monitor lytes replete K>4.0 and Mg>2.0   - Trend SCr  - HF following, appreciate recommendations    #HTN  - Hydralazine. Isosorbide dinitrate, Nifedipine    =====GI=====  #GERD  - Protonix 40mg IV QD    #Diet  - NPO except medications    =====Renal/=====  #Electrolytes  - Maintain K>4, Phos>3, Mag>2, iCal>1    #Hyponatremia  - Pt. with hypervolemia hyponatremia.   - Last SNa low at 121.   - Pt. initiated on IV Bumex infusion for hypervolemia management->Urgent HD  - fluid restriction (<1 L/day).   - Monitor SNa q8 hrs.   - Avoid overcorrection of SNa.    #Acute renal failure superimposed on chronic kidney disease.   - Pt with CKD 3-4, baseline cr approximately 2, now with ISAMAR on CKD  - Monitor renal function/UOP, avoid nephrotoxins  - Sodium Bicarb Tabs  - urgent HD today per nephro for volume overload and anuria,     #Metabolic acidosis  Pt. with metabolic acidosis in the setting of volume overload and renal failure. pH is low at 7.19, and SCO2 decreased to 14 today.   - Plan to initiate HD, as outlined above.   - Monitor SCO2    =====Endocrine=====  #DM2  - While NPO, FSBG and ADRIAN q6h    #Hypothyroidism  - Synthroid 125 mcg daily    =====Infectious Disease=====  Pt hypothermic on oral and rectal temp (unable to obtain temp on thermometer)  - f/u blood cultures, UA    =====Heme/Onc=====  #DVT Ppx  - heparin 5000 subQ    #Anemia  - Hgb 7-8, not clinically bleeding. Likely in s/o renal disease.   - CTM, maintain Hgb >7    =====Ethics=====  FULL CODE MICU Transfer Note  ---------------------------    Transfer from: MICU  Transfer to:  ( x ) Medicine    (  ) Telemetry    (  ) RCU    (  ) Palliative    (  ) Stroke Unit    (  ) _______________  Accepting Physician:     SAMSON   Ms. Hernandez is a 59 yo F w/ HTN, HLD, DM2, CKD (stage 3-4), hypothyroidism (c/b myxedema coma in past), severe pulmonary hypertension, mod-severe TR, asthma, HFpEF, presents for progressive dyspnea at rest with left sided chest tightness. She reports SOB on exertion as well. She reports over last few days she has had non-productive cough, decrease PO intake, nausea this morning, and diarrhea (without blood) this morning. She was placed on BIPAP in the ED. CXR was sig for small bilateral pleural effusions. Labs were sig for the following: BUN/Cr 110/3.92, Na 122 (last 133 in August), CO2 15, AG 20, -290, d-dimer 3509, WBC 9.19, Hb 8.9/26.7, (8.2/1245), MCV 75.4, RDW 14.6, neutrophil count 87.1%, alk phos 1093, pro-BNP 2092, LA 0.9, pCO2 36.     Most recent TTE (8/8) showing HFpEF with elevated RVSP and RV dilation.   Most recent RHC (8/28): RA 17 mmHg. PA 51/22/36 mmHg. PCW 25 mmHg. CO 6.69 L/min with CI 4.25 L/min/m2 (Hgb 6.8). SVR 89    MICU COURSE  MICU consulted for urgent dialysis catheter (shiley) placement and HD i/s/o anuria and rising SCr and hypervolemia. During MICU stay, shiley placed and patient started on HD w/ 1L removal on 9/22 and 9/23. S/p HD, patient's labs and mentation remarkably improved. TTE remarkable for large pericardial effusion w/o tamponade physiology; LE duplex ordered to r/o DVT.      To-Do:  [ ] F/u Nephrology recs re: more HD or permanent HD, need for access  [ ] F/u HF recs  [ ] F/u Lactate   [ ] Once output decreases to less than 30cc in 24 hours, interventional NP will remove drain - contact once     OBJECTIVE --  Vital Signs Last 24 Hrs  T(C): 35.8 (23 Sep 2023 23:00), Max: 35.8 (23 Sep 2023 23:00)  T(F): 96.5 (23 Sep 2023 23:00), Max: 96.5 (23 Sep 2023 23:00)  HR: 84 (24 Sep 2023 02:00) (67 - 84)  BP: 115/78 (24 Sep 2023 02:00) (103/72 - 150/76)  BP(mean): 83 (24 Sep 2023 02:00) (81 - 111)  RR: 20 (24 Sep 2023 02:00) (12 - 26)  SpO2: 93% (24 Sep 2023 02:00) (92% - 100%)    Parameters below as of 24 Sep 2023 00:00  Patient On (Oxygen Delivery Method): nasal cannula  O2 Flow (L/min): 2      I&O's Summary    22 Sep 2023 07:01  -  23 Sep 2023 07:00  --------------------------------------------------------  IN: 500 mL / OUT: 2105 mL / NET: -1605 mL    23 Sep 2023 07:01  -  24 Sep 2023 03:41  --------------------------------------------------------  IN: 728 mL / OUT: 1500 mL / NET: -772 mL        MEDICATIONS  (STANDING):  atorvastatin 40 milliGRAM(s) Oral at bedtime  chlorhexidine 2% Cloths 1 Application(s) Topical <User Schedule>  dextrose 5%. 1000 milliLiter(s) (100 mL/Hr) IV Continuous <Continuous>  dextrose 5%. 1000 milliLiter(s) (50 mL/Hr) IV Continuous <Continuous>  dextrose 50% Injectable 25 Gram(s) IV Push once  dextrose 50% Injectable 25 Gram(s) IV Push once  dextrose 50% Injectable 12.5 Gram(s) IV Push once  glucagon  Injectable 1 milliGRAM(s) IntraMuscular once  heparin   Injectable 5000 Unit(s) SubCutaneous every 8 hours  hydrALAZINE 100 milliGRAM(s) Oral three times a day  insulin lispro (ADMELOG) corrective regimen sliding scale   SubCutaneous three times a day before meals  insulin lispro (ADMELOG) corrective regimen sliding scale   SubCutaneous at bedtime  isosorbide   dinitrate Tablet (ISORDIL) 10 milliGRAM(s) Oral three times a day  levothyroxine 125 MICROGram(s) Oral daily  NIFEdipine XL 60 milliGRAM(s) Oral daily  pantoprazole    Tablet 40 milliGRAM(s) Oral before breakfast  sodium bicarbonate 650 milliGRAM(s) Oral three times a day    MEDICATIONS  (PRN):  dextrose Oral Gel 15 Gram(s) Oral once PRN Blood Glucose LESS THAN 70 milliGRAM(s)/deciliter  melatonin 3 milliGRAM(s) Oral at bedtime PRN Insomnia        LABS                                            7.7                   Neurophils% (auto):   87.2   (09-24 @ 02:28):    6.67 )-----------(228          Lymphocytes% (auto):  6.1                                           23.4                   Eosinphils% (auto):   0.4      Manual%: Neutrophils x    ; Lymphocytes x    ; Eosinophils x    ; Bands%: x    ; Blasts x                                    136    |  94     |  52                  Calcium: 8.8   / iCa: 1.03   (09-24 @ 02:28)    ----------------------------<  144       Magnesium: 2.40                             4.9     |  19     |  2.58             Phosphorous: 5.4      TPro  6.9    /  Alb  3.7    /  TBili  1.0    /  DBili  x      /  AST  63     /  ALT  32     /  AlkPhos  900    24 Sep 2023 02:28    ( 09-24 @ 02:28 )   PT: 17.3 sec;   INR: 1.56 ratio  aPTT: 73.9 sec          ASSESSMENT & PLAN:   Ms. Hernandez is a 59 yo F w/ HTN, HLD, DM2, CKD (stage 3-4), hypothyroidism (c/b myxedema coma in past), severe pulmonary hypertension, mod-severe TR, asthma, HFpEF, presents for progressive dyspnea at rest with left sided chest tightness, found to be in ADHF, admitted to MICU for urgent HD due to acidosis and volume OL, anuric on bumex drip    PLAN  =====Neurologic=====  No active issues    =====Pulmonary=====  #Dyspnea   Plan: Acute on chronic right sided CHF vs progressive renal failure vs PE (will need to r/o with PE, but given kidney dysfunction will do dopplers of LE first to r/o DVT given high risk of ESRD with contrast).   -STAT duplex of lower extremity reordered to r/o DVT  -troponin 65->64  -pro-BNP 2092   -RVP negative   Most recent TTE (8/6) showing HFpEF with elevated RVSP and RV dilation.   -TTE ordered   - escalated to bumex drip, poor UOP  - Currently on 3L NC, kai    =====Cardiovascular=====  #Acute on chronic diastolic congestive heart failure  - Last EF 8/2023 69%  - Bumex gtt 2mg/hr->Urgent HD for volume removal due to anuria  - Strict I/O  - Daily standing weights  - Monitor lytes replete K>4.0 and Mg>2.0   - Trend SCr  - HF following, appreciate recommendations    #HTN  - Hydralazine. Isosorbide dinitrate, Nifedipine    =====GI=====  #GERD  - Protonix 40mg IV QD    #Diet  - NPO except medications    =====Renal/=====  #Electrolytes  - Maintain K>4, Phos>3, Mag>2, iCal>1    #Hyponatremia  - Pt. with hypervolemia hyponatremia.   - Last SNa low at 121.   - Pt. initiated on IV Bumex infusion for hypervolemia management->Urgent HD  - fluid restriction (<1 L/day).   - Monitor SNa q8 hrs.   - Avoid overcorrection of SNa.    #Acute renal failure superimposed on chronic kidney disease.   - Pt with CKD 3-4, baseline cr approximately 2, now with ISAMAR on CKD  - Monitor renal function/UOP, avoid nephrotoxins  - Sodium Bicarb Tabs  - urgent HD today per nephro for volume overload and anuria,     #Metabolic acidosis  Pt. with metabolic acidosis in the setting of volume overload and renal failure. pH is low at 7.19, and SCO2 decreased to 14 today.   - Plan to initiate HD, as outlined above.   - Monitor SCO2    =====Endocrine=====  #DM2  - While NPO, FSBG and ADRIAN q6h    #Hypothyroidism  - Synthroid 125 mcg daily    =====Infectious Disease=====  Pt hypothermic on oral and rectal temp (unable to obtain temp on thermometer)  - f/u blood cultures, UA    =====Heme/Onc=====  #DVT Ppx  - heparin 5000 subQ    #Anemia  - Hgb 7-8, not clinically bleeding. Likely in s/o renal disease.   - CTM, maintain Hgb >7    =====Ethics=====  FULL CODE MICU Transfer Note  ---------------------------    Transfer from: MICU  Transfer to:  (  ) Medicine    ( X ) Telemetry    (  ) RCU    (  ) Palliative    (  ) Stroke Unit    (  ) _______________  Accepting Physician:     SAMSON   Ms. Hernandez is a 57 yo F w/ HTN, HLD, DM2, CKD (stage 3-4), hypothyroidism (c/b myxedema coma in past), severe pulmonary hypertension, mod-severe TR, asthma, HFpEF, presents for progressive dyspnea at rest with left sided chest tightness. She reports SOB on exertion as well. She reports over last few days she has had non-productive cough, decrease PO intake, nausea this morning, and diarrhea (without blood) this morning. She was placed on BIPAP in the ED. CXR was sig for small bilateral pleural effusions. Labs were sig for the following: BUN/Cr 110/3.92, Na 122 (last 133 in August), CO2 15, AG 20, -290, d-dimer 3509, WBC 9.19, Hb 8.9/26.7, (8.2/1245), MCV 75.4, RDW 14.6, neutrophil count 87.1%, alk phos 1093, pro-BNP 2092, LA 0.9, pCO2 36.     Most recent TTE (8/8) showing HFpEF with elevated RVSP and RV dilation.   Most recent RHC (8/28): RA 17 mmHg. PA 51/22/36 mmHg. PCW 25 mmHg. CO 6.69 L/min with CI 4.25 L/min/m2 (Hgb 6.8). SVR 89    MICU COURSE  MICU consulted for urgent dialysis catheter (shiley) placement and HD i/s/o anuria and rising SCr and hypervolemia. During MICU stay, shiley placed and patient started on HD w/ 1L removal on 9/22 and 9/23. S/p HD, patient's labs and mentation remarkably improved. TTE remarkable for large pericardial effusion w/o tamponade physiology; LE duplex ordered to r/o DVT.      To-Do:  [ ] F/u Nephrology recs re: more HD or permanent HD, need for access  [ ] F/u HF recs  [ ] F/u Lactate   [ ] Once output decreases to less than 30cc in 24 hours, interventional NP will remove drain - contact once     OBJECTIVE --  Vital Signs Last 24 Hrs  T(C): 35.8 (23 Sep 2023 23:00), Max: 35.8 (23 Sep 2023 23:00)  T(F): 96.5 (23 Sep 2023 23:00), Max: 96.5 (23 Sep 2023 23:00)  HR: 84 (24 Sep 2023 02:00) (67 - 84)  BP: 115/78 (24 Sep 2023 02:00) (103/72 - 150/76)  BP(mean): 83 (24 Sep 2023 02:00) (81 - 111)  RR: 20 (24 Sep 2023 02:00) (12 - 26)  SpO2: 93% (24 Sep 2023 02:00) (92% - 100%)    Parameters below as of 24 Sep 2023 00:00  Patient On (Oxygen Delivery Method): nasal cannula  O2 Flow (L/min): 2      I&O's Summary    22 Sep 2023 07:01  -  23 Sep 2023 07:00  --------------------------------------------------------  IN: 500 mL / OUT: 2105 mL / NET: -1605 mL    23 Sep 2023 07:01  -  24 Sep 2023 03:41  --------------------------------------------------------  IN: 728 mL / OUT: 1500 mL / NET: -772 mL        MEDICATIONS  (STANDING):  atorvastatin 40 milliGRAM(s) Oral at bedtime  chlorhexidine 2% Cloths 1 Application(s) Topical <User Schedule>  dextrose 5%. 1000 milliLiter(s) (100 mL/Hr) IV Continuous <Continuous>  dextrose 5%. 1000 milliLiter(s) (50 mL/Hr) IV Continuous <Continuous>  dextrose 50% Injectable 25 Gram(s) IV Push once  dextrose 50% Injectable 25 Gram(s) IV Push once  dextrose 50% Injectable 12.5 Gram(s) IV Push once  glucagon  Injectable 1 milliGRAM(s) IntraMuscular once  heparin   Injectable 5000 Unit(s) SubCutaneous every 8 hours  hydrALAZINE 100 milliGRAM(s) Oral three times a day  insulin lispro (ADMELOG) corrective regimen sliding scale   SubCutaneous three times a day before meals  insulin lispro (ADMELOG) corrective regimen sliding scale   SubCutaneous at bedtime  isosorbide   dinitrate Tablet (ISORDIL) 10 milliGRAM(s) Oral three times a day  levothyroxine 125 MICROGram(s) Oral daily  NIFEdipine XL 60 milliGRAM(s) Oral daily  pantoprazole    Tablet 40 milliGRAM(s) Oral before breakfast  sodium bicarbonate 650 milliGRAM(s) Oral three times a day    MEDICATIONS  (PRN):  dextrose Oral Gel 15 Gram(s) Oral once PRN Blood Glucose LESS THAN 70 milliGRAM(s)/deciliter  melatonin 3 milliGRAM(s) Oral at bedtime PRN Insomnia        LABS                                            7.7                   Neurophils% (auto):   87.2   (09-24 @ 02:28):    6.67 )-----------(228          Lymphocytes% (auto):  6.1                                           23.4                   Eosinphils% (auto):   0.4      Manual%: Neutrophils x    ; Lymphocytes x    ; Eosinophils x    ; Bands%: x    ; Blasts x                                    136    |  94     |  52                  Calcium: 8.8   / iCa: 1.03   (09-24 @ 02:28)    ----------------------------<  144       Magnesium: 2.40                             4.9     |  19     |  2.58             Phosphorous: 5.4      TPro  6.9    /  Alb  3.7    /  TBili  1.0    /  DBili  x      /  AST  63     /  ALT  32     /  AlkPhos  900    24 Sep 2023 02:28    ( 09-24 @ 02:28 )   PT: 17.3 sec;   INR: 1.56 ratio  aPTT: 73.9 sec          ASSESSMENT & PLAN:   Ms. Hernandez is a 57 yo F w/ HTN, HLD, DM2, CKD (stage 3-4), hypothyroidism (c/b myxedema coma in past), severe pulmonary hypertension, mod-severe TR, asthma, HFpEF, presents for progressive dyspnea at rest with left sided chest tightness, found to be in ADHF, admitted to MICU for urgent HD due to acidosis and volume OL, anuric on bumex drip    PLAN  =====Neurologic=====  No active issues    =====Pulmonary=====  #Dyspnea   Plan: Acute on chronic right sided CHF vs progressive renal failure vs PE (will need to r/o with PE, but given kidney dysfunction will do dopplers of LE first to r/o DVT given high risk of ESRD with contrast).   -STAT duplex of lower extremity reordered to r/o DVT  -troponin 65->64  -pro-BNP 2092   -RVP negative   Most recent TTE (8/6) showing HFpEF with elevated RVSP and RV dilation.   -TTE ordered   - escalated to bumex drip, poor UOP  - Currently on 3L NC, kai    =====Cardiovascular=====  #Acute on chronic diastolic congestive heart failure  - Last EF 8/2023 69%  - Bumex gtt 2mg/hr->Urgent HD for volume removal due to anuria  - Strict I/O  - Daily standing weights  - Monitor lytes replete K>4.0 and Mg>2.0   - Trend SCr  - HF following, appreciate recommendations    #HTN  - Hydralazine. Isosorbide dinitrate, Nifedipine    =====GI=====  #GERD  - Protonix 40mg IV QD    #Diet  - NPO except medications    =====Renal/=====  #Electrolytes  - Maintain K>4, Phos>3, Mag>2, iCal>1    #Hyponatremia  - Pt. with hypervolemia hyponatremia.   - Last SNa low at 121.   - Pt. initiated on IV Bumex infusion for hypervolemia management->Urgent HD  - fluid restriction (<1 L/day).   - Monitor SNa q8 hrs.   - Avoid overcorrection of SNa.    #Acute renal failure superimposed on chronic kidney disease.   - Pt with CKD 3-4, baseline cr approximately 2, now with ISAMAR on CKD  - Monitor renal function/UOP, avoid nephrotoxins  - Sodium Bicarb Tabs  - urgent HD today per nephro for volume overload and anuria,     #Metabolic acidosis  Pt. with metabolic acidosis in the setting of volume overload and renal failure. pH is low at 7.19, and SCO2 decreased to 14 today.   - Plan to initiate HD, as outlined above.   - Monitor SCO2    =====Endocrine=====  #DM2  - While NPO, FSBG and ADRIAN q6h    #Hypothyroidism  - Synthroid 125 mcg daily    =====Infectious Disease=====  Pt hypothermic on oral and rectal temp (unable to obtain temp on thermometer)  - f/u blood cultures, UA    =====Heme/Onc=====  #DVT Ppx  - heparin 5000 subQ    #Anemia  - Hgb 7-8, not clinically bleeding. Likely in s/o renal disease.   - CTM, maintain Hgb >7    =====Ethics=====  FULL CODE MICU Transfer Note  ---------------------------    Transfer from: MICU  Transfer to:  (  ) Medicine    ( X ) Telemetry    (  ) RCU    (  ) Palliative    (  ) Stroke Unit    (  ) _______________  Accepting Physician:     SAMSON   Ms. Hernandez is a 57 yo F w/ HTN, HLD, DM2, CKD (stage 3-4), hypothyroidism (c/b myxedema coma in past), severe pulmonary hypertension, mod-severe TR, asthma, HFpEF, presents for progressive dyspnea at rest with left sided chest tightness. She reports SOB on exertion as well. She reports over last few days she has had non-productive cough, decrease PO intake, nausea, and diarrhea (without blood). She was placed on BIPAP in the ED. CXR was sig for small bilateral pleural effusions. Labs were sig for the following: BUN/Cr 110/3.92, Na 122 (last 133 in August), CO2 15, AG 20, -290, d-dimer 3509, WBC 9.19, Hb 8.9/26.7, (8.2/1245), MCV 75.4, RDW 14.6, neutrophil count 87.1%, alk phos 1093, pro-BNP 2092, LA 0.9, pCO2 36.     MICU COURSE  MICU consulted for urgent dialysis catheter (shiley) placement and HD i/s/o anuria and rising SCr and hypervolemia. During MICU stay, shiley placed and patient started on HD w/ 1L removal on 9/22 and 9/23. S/p HD, patient's labs and mentation remarkably improved. TTE remarkable for large pericardial effusion w/o tamponade physiology; LE duplex r/o DVT. On HD as per nephro recs with resolving volume overload and HTN. Pericardial drain in place and draining less, pending  Cx and removal.       To-Do:  [ ] F/u Nephrology recs re: more HD or permanent HD, need for access  [ ] F/u HF recs  [ ] F/u pericardial drain removal  [ ] Once output decreases to less than 30cc in 24 hours, interventional NP will remove drain - contact once   [ ] F/u Lactate (decreasing)   [ ] F/u Pericardial fluid Cx and need for Abx     OBJECTIVE --  Vital Signs Last 24 Hrs  T(C): 35.8 (23 Sep 2023 23:00), Max: 35.8 (23 Sep 2023 23:00)  T(F): 96.5 (23 Sep 2023 23:00), Max: 96.5 (23 Sep 2023 23:00)  HR: 84 (24 Sep 2023 02:00) (67 - 84)  BP: 115/78 (24 Sep 2023 02:00) (103/72 - 150/76)  BP(mean): 83 (24 Sep 2023 02:00) (81 - 111)  RR: 20 (24 Sep 2023 02:00) (12 - 26)  SpO2: 93% (24 Sep 2023 02:00) (92% - 100%)    Parameters below as of 24 Sep 2023 00:00  Patient On (Oxygen Delivery Method): nasal cannula  O2 Flow (L/min): 2      I&O's Summary    22 Sep 2023 07:01  -  23 Sep 2023 07:00  --------------------------------------------------------  IN: 500 mL / OUT: 2105 mL / NET: -1605 mL    23 Sep 2023 07:01  -  24 Sep 2023 03:41  --------------------------------------------------------  IN: 728 mL / OUT: 1500 mL / NET: -772 mL        MEDICATIONS  (STANDING):  atorvastatin 40 milliGRAM(s) Oral at bedtime  chlorhexidine 2% Cloths 1 Application(s) Topical <User Schedule>  dextrose 5%. 1000 milliLiter(s) (100 mL/Hr) IV Continuous <Continuous>  dextrose 5%. 1000 milliLiter(s) (50 mL/Hr) IV Continuous <Continuous>  dextrose 50% Injectable 25 Gram(s) IV Push once  dextrose 50% Injectable 25 Gram(s) IV Push once  dextrose 50% Injectable 12.5 Gram(s) IV Push once  glucagon  Injectable 1 milliGRAM(s) IntraMuscular once  heparin   Injectable 5000 Unit(s) SubCutaneous every 8 hours  hydrALAZINE 100 milliGRAM(s) Oral three times a day  insulin lispro (ADMELOG) corrective regimen sliding scale   SubCutaneous three times a day before meals  insulin lispro (ADMELOG) corrective regimen sliding scale   SubCutaneous at bedtime  isosorbide   dinitrate Tablet (ISORDIL) 10 milliGRAM(s) Oral three times a day  levothyroxine 125 MICROGram(s) Oral daily  NIFEdipine XL 60 milliGRAM(s) Oral daily  pantoprazole    Tablet 40 milliGRAM(s) Oral before breakfast  sodium bicarbonate 650 milliGRAM(s) Oral three times a day    MEDICATIONS  (PRN):  dextrose Oral Gel 15 Gram(s) Oral once PRN Blood Glucose LESS THAN 70 milliGRAM(s)/deciliter  melatonin 3 milliGRAM(s) Oral at bedtime PRN Insomnia        LABS                                            7.7                   Neurophils% (auto):   87.2   (09-24 @ 02:28):    6.67 )-----------(228          Lymphocytes% (auto):  6.1                                           23.4                   Eosinphils% (auto):   0.4      Manual%: Neutrophils x    ; Lymphocytes x    ; Eosinophils x    ; Bands%: x    ; Blasts x                                    136    |  94     |  52                  Calcium: 8.8   / iCa: 1.03   (09-24 @ 02:28)    ----------------------------<  144       Magnesium: 2.40                             4.9     |  19     |  2.58             Phosphorous: 5.4      TPro  6.9    /  Alb  3.7    /  TBili  1.0    /  DBili  x      /  AST  63     /  ALT  32     /  AlkPhos  900    24 Sep 2023 02:28    ( 09-24 @ 02:28 )   PT: 17.3 sec;   INR: 1.56 ratio  aPTT: 73.9 sec          ASSESSMENT & PLAN:     ASSESSMENT  Ms. Hernandez is a 57 yo F w/ HTN, HLD, DM2, CKD (stage 3-4), hypothyroidism (c/b myxedema coma in past), severe pulmonary hypertension, mod-severe TR, asthma, HFpEF, presents for progressive dyspnea at rest with left sided chest tightness, found to be in ADHF, admitted to MICU for urgent HD due to acidosis and volume OL, anuric on MWF HD. On zosyn pending Cx and s/p HD most recently on 9/26. pericardial drain removal pending f/u recs.    PLAN  =====Neurologic=====  No active issues; AOx3    =====Pulmonary=====  #Dyspnea   Plan: likely d/t Acute on chronic right sided CHF vs progressive renal failure. Less likely d/t PE. However, will need to r/o with PE, but given kidney dysfunction will do dopplers of LE first to r/o DVT given high risk of ESRD with contrast  - Neg duplex US  of lower extremity on 9/22  -troponin 65->64  - pro-BNP 2092 --> 1819  - RVP negative   -TTE showing HFpEF with elevated RVSP and RV dilation.   - Currently on 2L NC    =====Cardiovascular=====  #Cardiogenic shock  - i/s/o pericardial effusion s/p drain  - 9/25 POCUS f/u  --> Bilateral femoral and popliteal venous compression noted bilaterally. INTERPRETATION: Bilateral pleural effusions at the lung bases left>right with an off-axis heart, trace pericardial effusion and low suspicion of DVT  - lactate improved 1.4<--3.7 <-- 11.6   - Off levo during HD, will continue if needed with hold parameters  - CT a/p 09/24 showing no evidence of bleed  - TTE 09/24 showing evidence of RA and RV partial compression consistent w/o possible tamponade physiology  - Repeat TTE 09/25 ordered; Cardiology signed off on her downgrade to telemetry    #Acute on chronic diastolic congestive heart failure  - Last EF 8/2023 69%  - C/w HD as per nephro; last session Tuesday 9/26  - Strict I/O & Daily standing weights  - Monitor lytes replete K>4.0 and Mg>2.0  - SCr increased slightly to 2.1 from 1.57 on 9/25, getting HD today  - Remains oliguric   - HF following, appreciate recommendations    #HTN  - Dc'd Hydralazine. Isosorbide dinitrate, Nifedipine on 9/24  - BPs WNL over last 24h    =====GI=====  #GERD  - Protonix 40mg IV QD  #DIET CC w/ evening snack    #Nausea   - i/s/o kidney injury, rising lactate  - zofran PRN    #Diet  - CC diet w/ volume restriction    =====Renal/=====  #Electrolytes  - Maintain K>4, Phos>3, Mag>2, iCal>1    #Hyponatremia  - Pt. with hypervolemia hyponatremia, today improved to 140  - Pt. initiated on IV Bumex infusion for hypervolemia management->Urgent HD  - fluid restriction (<1 L/day).   - Monitor SNa q12 hrs.   - Avoid overcorrection of Na. (4-6/ 24hr)     #Acute renal failure superimposed on chronic kidney disease.   - Pt with CKD 3-4, baseline cr approximately 2, now with ISAMAR on CKD  - Monitor renal function/UOP, avoid nephrotoxins  - Sodium Bicarb Tabs  - S/p HD 9/25 and 9/26 for volume overload and oliguria, removed 3L on 9/26.    #Metabolic acidosis  Pt. with metabolic acidosis in the setting of volume overload and renal failure. improving pH 7.44 <-- 7.18 <-- 7.19   - iHD per nephro, as outlined above.   - Monitor SCO2    =====Endocrine=====  #DM2  - FSBG   - MISS <-- ADRIAN given elevated blood glucose.   - Basal insulin held 9/26 AM d/t  low POCT 80    #Hypothyroidism  - TSH 4.58; C/w 125 synthroid daily    =====Infectious Disease=====  Pt hypothermic on oral and rectal temp (unable to obtain temp on thermometer)  - Temps 95-97 range  - Urine and Blood Cx NGTD    =====Heme/Onc=====  #DVT Ppx  - DC heparin 5000 subQ  - SCD initiated    #Anemia  - Hgb 7-8, not clinically bleeding. Likely in s/o renal disease.   - CTM, maintain Hgb >7 (6.9 -->8.2 on 9/25 s/p HD and 1 unit pRBC)    =====Ethics=====  FULL CODE MICU Transfer Note  ---------------------------    Transfer from: MICU  Transfer to:  (  ) Medicine    ( X ) Telemetry    (  ) RCU    (  ) Palliative    (  ) Stroke Unit    (  ) 818T  Accepting Physician: Dr. Shama DAVIES   Ms. Hernandez is a 57 yo F w/ HTN, HLD, DM2, CKD (stage 3-4), hypothyroidism (c/b myxedema coma in past), severe pulmonary hypertension, mod-severe TR, asthma, HFpEF, presents for progressive dyspnea at rest with left sided chest tightness. She reports SOB on exertion as well. She reports over last few days she has had non-productive cough, decrease PO intake, nausea, and diarrhea (without blood). She was placed on BIPAP in the ED. CXR was sig for small bilateral pleural effusions. Labs were sig for the following: BUN/Cr 110/3.92, Na 122 (last 133 in August), CO2 15, AG 20, -290, d-dimer 3509, WBC 9.19, Hb 8.9/26.7, (8.2/1245), MCV 75.4, RDW 14.6, neutrophil count 87.1%, alk phos 1093, pro-BNP 2092, LA 0.9, pCO2 36.     MICU COURSE  MICU consulted for urgent dialysis catheter (shiley) placement and HD i/s/o anuria and rising SCr and hypervolemia. During MICU stay, shiley placed and patient started on HD w/ 1L removal on 9/22 and 9/23. S/p HD, patient's labs and mentation remarkably improved. TTE remarkable for large pericardial effusion w/o tamponade physiology; LE duplex r/o DVT. On HD as per nephro recs with resolving volume overload and HTN. Pericardial drain in place and draining less, pending  Cx and removal.       To-Do:  [ ] F/u Nephrology recs re: more HD or permanent HD, need for access  [ ] F/u HF recs  [ ] F/u pericardial drain removal  [ ] Once output decreases to less than 30cc in 24 hours, interventional NP will remove drain - contact once   [ ] F/u Lactate (decreasing)   [ ] F/u Pericardial fluid Cx and need for Abx     OBJECTIVE --  Vital Signs Last 24 Hrs  T(C): 35.8 (23 Sep 2023 23:00), Max: 35.8 (23 Sep 2023 23:00)  T(F): 96.5 (23 Sep 2023 23:00), Max: 96.5 (23 Sep 2023 23:00)  HR: 84 (24 Sep 2023 02:00) (67 - 84)  BP: 115/78 (24 Sep 2023 02:00) (103/72 - 150/76)  BP(mean): 83 (24 Sep 2023 02:00) (81 - 111)  RR: 20 (24 Sep 2023 02:00) (12 - 26)  SpO2: 93% (24 Sep 2023 02:00) (92% - 100%)    Parameters below as of 24 Sep 2023 00:00  Patient On (Oxygen Delivery Method): nasal cannula  O2 Flow (L/min): 2      I&O's Summary    22 Sep 2023 07:01  -  23 Sep 2023 07:00  --------------------------------------------------------  IN: 500 mL / OUT: 2105 mL / NET: -1605 mL    23 Sep 2023 07:01  -  24 Sep 2023 03:41  --------------------------------------------------------  IN: 728 mL / OUT: 1500 mL / NET: -772 mL        MEDICATIONS  (STANDING):  atorvastatin 40 milliGRAM(s) Oral at bedtime  chlorhexidine 2% Cloths 1 Application(s) Topical <User Schedule>  dextrose 5%. 1000 milliLiter(s) (100 mL/Hr) IV Continuous <Continuous>  dextrose 5%. 1000 milliLiter(s) (50 mL/Hr) IV Continuous <Continuous>  dextrose 50% Injectable 25 Gram(s) IV Push once  dextrose 50% Injectable 25 Gram(s) IV Push once  dextrose 50% Injectable 12.5 Gram(s) IV Push once  glucagon  Injectable 1 milliGRAM(s) IntraMuscular once  heparin   Injectable 5000 Unit(s) SubCutaneous every 8 hours  hydrALAZINE 100 milliGRAM(s) Oral three times a day  insulin lispro (ADMELOG) corrective regimen sliding scale   SubCutaneous three times a day before meals  insulin lispro (ADMELOG) corrective regimen sliding scale   SubCutaneous at bedtime  isosorbide   dinitrate Tablet (ISORDIL) 10 milliGRAM(s) Oral three times a day  levothyroxine 125 MICROGram(s) Oral daily  NIFEdipine XL 60 milliGRAM(s) Oral daily  pantoprazole    Tablet 40 milliGRAM(s) Oral before breakfast  sodium bicarbonate 650 milliGRAM(s) Oral three times a day    MEDICATIONS  (PRN):  dextrose Oral Gel 15 Gram(s) Oral once PRN Blood Glucose LESS THAN 70 milliGRAM(s)/deciliter  melatonin 3 milliGRAM(s) Oral at bedtime PRN Insomnia        LABS                                            7.7                   Neurophils% (auto):   87.2   (09-24 @ 02:28):    6.67 )-----------(228          Lymphocytes% (auto):  6.1                                           23.4                   Eosinphils% (auto):   0.4      Manual%: Neutrophils x    ; Lymphocytes x    ; Eosinophils x    ; Bands%: x    ; Blasts x                                    136    |  94     |  52                  Calcium: 8.8   / iCa: 1.03   (09-24 @ 02:28)    ----------------------------<  144       Magnesium: 2.40                             4.9     |  19     |  2.58             Phosphorous: 5.4      TPro  6.9    /  Alb  3.7    /  TBili  1.0    /  DBili  x      /  AST  63     /  ALT  32     /  AlkPhos  900    24 Sep 2023 02:28    ( 09-24 @ 02:28 )   PT: 17.3 sec;   INR: 1.56 ratio  aPTT: 73.9 sec          ASSESSMENT & PLAN:     ASSESSMENT  Ms. Hernandez is a 57 yo F w/ HTN, HLD, DM2, CKD (stage 3-4), hypothyroidism (c/b myxedema coma in past), severe pulmonary hypertension, mod-severe TR, asthma, HFpEF, presents for progressive dyspnea at rest with left sided chest tightness, found to be in ADHF, admitted to MICU for urgent HD due to acidosis and volume OL, anuric on MWF HD. On zosyn pending Cx and s/p HD most recently on 9/26. pericardial drain removal pending f/u recs.    PLAN  =====Neurologic=====  No active issues; AOx3    =====Pulmonary=====  #Dyspnea   Plan: likely d/t Acute on chronic right sided CHF vs progressive renal failure. Less likely d/t PE. However, will need to r/o with PE, but given kidney dysfunction will do dopplers of LE first to r/o DVT given high risk of ESRD with contrast  - Neg duplex US  of lower extremity on 9/22  -troponin 65->64  - pro-BNP 2092 --> 1819  - RVP negative   -TTE showing HFpEF with elevated RVSP and RV dilation.   - Currently on 2L NC    =====Cardiovascular=====  #Cardiogenic shock  - i/s/o pericardial effusion s/p drain  - 9/25 POCUS f/u  --> Bilateral femoral and popliteal venous compression noted bilaterally. INTERPRETATION: Bilateral pleural effusions at the lung bases left>right with an off-axis heart, trace pericardial effusion and low suspicion of DVT  - lactate improved 1.4<--3.7 <-- 11.6   - Off levo during HD, will continue if needed with hold parameters  - CT a/p 09/24 showing no evidence of bleed  - TTE 09/24 showing evidence of RA and RV partial compression consistent w/o possible tamponade physiology  - Repeat TTE 09/25 ordered; Cardiology signed off on her downgrade to telemetry    #Acute on chronic diastolic congestive heart failure  - Last EF 8/2023 69%  - C/w HD as per nephro; last session Tuesday 9/26  - Strict I/O & Daily standing weights  - Monitor lytes replete K>4.0 and Mg>2.0  - SCr increased slightly to 2.1 from 1.57 on 9/25, getting HD today  - Remains oliguric   - HF following, appreciate recommendations    #HTN  - Dc'd Hydralazine. Isosorbide dinitrate, Nifedipine on 9/24  - BPs WNL over last 24h    =====GI=====  #GERD  - Protonix 40mg IV QD  #DIET CC w/ evening snack    #Nausea   - i/s/o kidney injury, rising lactate  - zofran PRN    #Diet  - CC diet w/ volume restriction    =====Renal/=====  #Electrolytes  - Maintain K>4, Phos>3, Mag>2, iCal>1    #Hyponatremia  - Pt. with hypervolemia hyponatremia, today improved to 140  - Pt. initiated on IV Bumex infusion for hypervolemia management->Urgent HD  - fluid restriction (<1 L/day).   - Monitor SNa q12 hrs.   - Avoid overcorrection of Na. (4-6/ 24hr)     #Acute renal failure superimposed on chronic kidney disease.   - Pt with CKD 3-4, baseline cr approximately 2, now with ISAMAR on CKD  - Monitor renal function/UOP, avoid nephrotoxins  - Sodium Bicarb Tabs  - S/p HD 9/25 and 9/26 for volume overload and oliguria, removed 3L on 9/26.    #Metabolic acidosis  Pt. with metabolic acidosis in the setting of volume overload and renal failure. improving pH 7.44 <-- 7.18 <-- 7.19   - iHD per nephro, as outlined above.   - Monitor SCO2    =====Endocrine=====  #DM2  - FSBG   - MISS <-- ADRIAN given elevated blood glucose.   - Basal insulin held 9/26 AM d/t  low POCT 80    #Hypothyroidism  - TSH 4.58; C/w 125 synthroid daily    =====Infectious Disease=====  Pt hypothermic on oral and rectal temp (unable to obtain temp on thermometer)  - Temps 95-97 range  - Urine and Blood Cx NGTD    =====Heme/Onc=====  #DVT Ppx  - DC heparin 5000 subQ  - SCD initiated    #Anemia  - Hgb 7-8, not clinically bleeding. Likely in s/o renal disease.   - CTM, maintain Hgb >7 (6.9 -->8.2 on 9/25 s/p HD and 1 unit pRBC)    =====Ethics=====  FULL CODE MICU Transfer Note  ---------------------------    Transfer from: MICU  Transfer to:  (  ) Medicine    ( X ) Telemetry    (  ) RCU    (  ) Palliative    (  ) Stroke Unit    (  ) 818T  Accepting Physician: Dr. Shama DAVIES   Ms. Hernandez is a 59 yo F w/ HTN, HLD, DM2, CKD (stage 3-4), hypothyroidism (c/b myxedema coma in past), severe pulmonary hypertension, mod-severe TR, asthma, HFpEF, presents for progressive dyspnea at rest with left sided chest tightness. She reports SOB on exertion as well. She reports over last few days she has had non-productive cough, decrease PO intake, nausea, and diarrhea (without blood). She was placed on BIPAP in the ED. CXR was sig for small bilateral pleural effusions. Labs were sig for the following: BUN/Cr 110/3.92, Na 122 (last 133 in August), CO2 15, AG 20, -290, d-dimer 3509, WBC 9.19, Hb 8.9/26.7, (8.2/1245), MCV 75.4, RDW 14.6, neutrophil count 87.1%, alk phos 1093, pro-BNP 2092, LA 0.9, pCO2 36.     MICU COURSE  MICU consulted for urgent dialysis catheter (shiley) placement and HD i/s/o anuria and rising SCr and hypervolemia. During MICU stay, shiley placed and patient started on HD w/ 1L removal on 9/22 and 9/23. S/p HD, patient's labs and mentation remarkably improved. TTE remarkable for large pericardial effusion w/o tamponade physiology; LE duplex r/o DVT. On HD as per nephro recs with resolving volume overload and HTN. Pericardial drain in place and draining less, pending Cx and removal when < 30cc.      To-Do:  [ ] F/u Nephrology recs re: more HD or permanent HD, need for access  [ ] F/u HF recs  [ ] F/u with pericardial drain removal; will need FFP and IV vit K prior to removal due to high INR/low PLTs and overall bleeding risk   --> Contact interventional NP for removal once output has decreased to less than 30cc in 24 hours, which is when they will remove the drain  [ ] F/u Lactate (decreasing)   [ ] F/u Pericardial fluid Cx and need for Abx     OBJECTIVE --  Vital Signs Last 24 Hrs  T(C): 35.8 (23 Sep 2023 23:00), Max: 35.8 (23 Sep 2023 23:00)  T(F): 96.5 (23 Sep 2023 23:00), Max: 96.5 (23 Sep 2023 23:00)  HR: 84 (24 Sep 2023 02:00) (67 - 84)  BP: 115/78 (24 Sep 2023 02:00) (103/72 - 150/76)  BP(mean): 83 (24 Sep 2023 02:00) (81 - 111)  RR: 20 (24 Sep 2023 02:00) (12 - 26)  SpO2: 93% (24 Sep 2023 02:00) (92% - 100%)    Parameters below as of 24 Sep 2023 00:00  Patient On (Oxygen Delivery Method): nasal cannula  O2 Flow (L/min): 2      I&O's Summary    22 Sep 2023 07:01  -  23 Sep 2023 07:00  --------------------------------------------------------  IN: 500 mL / OUT: 2105 mL / NET: -1605 mL    23 Sep 2023 07:01  -  24 Sep 2023 03:41  --------------------------------------------------------  IN: 728 mL / OUT: 1500 mL / NET: -772 mL        MEDICATIONS  (STANDING):  atorvastatin 40 milliGRAM(s) Oral at bedtime  chlorhexidine 2% Cloths 1 Application(s) Topical <User Schedule>  dextrose 5%. 1000 milliLiter(s) (100 mL/Hr) IV Continuous <Continuous>  dextrose 5%. 1000 milliLiter(s) (50 mL/Hr) IV Continuous <Continuous>  dextrose 50% Injectable 25 Gram(s) IV Push once  dextrose 50% Injectable 25 Gram(s) IV Push once  dextrose 50% Injectable 12.5 Gram(s) IV Push once  glucagon  Injectable 1 milliGRAM(s) IntraMuscular once  heparin   Injectable 5000 Unit(s) SubCutaneous every 8 hours  hydrALAZINE 100 milliGRAM(s) Oral three times a day  insulin lispro (ADMELOG) corrective regimen sliding scale   SubCutaneous three times a day before meals  insulin lispro (ADMELOG) corrective regimen sliding scale   SubCutaneous at bedtime  isosorbide   dinitrate Tablet (ISORDIL) 10 milliGRAM(s) Oral three times a day  levothyroxine 125 MICROGram(s) Oral daily  NIFEdipine XL 60 milliGRAM(s) Oral daily  pantoprazole    Tablet 40 milliGRAM(s) Oral before breakfast  sodium bicarbonate 650 milliGRAM(s) Oral three times a day    MEDICATIONS  (PRN):  dextrose Oral Gel 15 Gram(s) Oral once PRN Blood Glucose LESS THAN 70 milliGRAM(s)/deciliter  melatonin 3 milliGRAM(s) Oral at bedtime PRN Insomnia        LABS                                            7.7                   Neurophils% (auto):   87.2   (09-24 @ 02:28):    6.67 )-----------(228          Lymphocytes% (auto):  6.1                                           23.4                   Eosinphils% (auto):   0.4      Manual%: Neutrophils x    ; Lymphocytes x    ; Eosinophils x    ; Bands%: x    ; Blasts x                                    136    |  94     |  52                  Calcium: 8.8   / iCa: 1.03   (09-24 @ 02:28)    ----------------------------<  144       Magnesium: 2.40                             4.9     |  19     |  2.58             Phosphorous: 5.4      TPro  6.9    /  Alb  3.7    /  TBili  1.0    /  DBili  x      /  AST  63     /  ALT  32     /  AlkPhos  900    24 Sep 2023 02:28    ( 09-24 @ 02:28 )   PT: 17.3 sec;   INR: 1.56 ratio  aPTT: 73.9 sec          ASSESSMENT & PLAN:     ASSESSMENT  Ms. 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 On zosyn pending Cx and s/p HD most recently on 9/26. pericardial drain removal pending f/u recs.    PLAN  =====Neurologic=====  No active issues; AOx3    =====Pulmonary=====  #Dyspnea   Plan: likely d/t Acute on chronic right sided CHF vs progressive renal failure. Less likely d/t PE. However, will need to r/o with PE, but given kidney dysfunction will do dopplers of LE first to r/o DVT given high risk of ESRD with contrast  - Neg duplex US  of lower extremity on 9/22  -troponin 65->64  - pro-BNP 2092 --> 1819  - RVP negative   -TTE showing HFpEF with elevated RVSP and RV dilation.   - Currently on 2L NC    =====Cardiovascular=====  #Cardiogenic shock  - i/s/o pericardial effusion s/p drain  - 9/25 POCUS f/u  --> Bilateral femoral and popliteal venous compression noted bilaterally. INTERPRETATION: Bilateral pleural effusions at the lung bases left>right with an off-axis heart, trace pericardial effusion and low suspicion of DVT  - lactate improved 1.4<--3.7 <-- 11.6   - Off levo during HD, will continue if needed with hold parameters  - CT a/p 09/24 showing no evidence of bleed  - TTE 09/24 showing evidence of RA and RV partial compression consistent w/o possible tamponade physiology  - Repeat TTE 09/25 ordered; Cardiology signed off on her downgrade to telemetry    #Acute on chronic diastolic congestive heart failure  - Last EF 8/2023 69%  - C/w HD as per nephro; last session Tuesday 9/26  - Strict I/O & Daily standing weights  - Monitor lytes replete K>4.0 and Mg>2.0  - SCr increased slightly to 2.1 from 1.57 on 9/25, getting HD today  - Remains oliguric   - HF following, appreciate recommendations    #HTN  - Dc'd Hydralazine. Isosorbide dinitrate, Nifedipine on 9/24  - BPs WNL over last 24h    =====GI=====  #GERD  - Protonix 40mg IV QD  #DIET CC w/ evening snack    #Nausea   - i/s/o kidney injury, rising lactate  - zofran PRN    #Diet  - CC diet w/ volume restriction    =====Renal/=====  #Electrolytes  - Maintain K>4, Phos>3, Mag>2, iCal>1    #Hyponatremia  - Pt. with hypervolemia hyponatremia, today improved to 140  - Pt. initiated on IV Bumex infusion for hypervolemia management->Urgent HD  - fluid restriction (<1 L/day).   - Monitor SNa q12 hrs.   - Avoid overcorrection of Na. (4-6/ 24hr)     #Acute renal failure superimposed on chronic kidney disease.   - Pt with CKD 3-4, baseline cr approximately 2, now with ISAMAR on CKD  - Monitor renal function/UOP, avoid nephrotoxins  - Sodium Bicarb Tabs  - S/p HD 9/25 and 9/26 for volume overload and oliguria, removed 3L on 9/26.    #Metabolic acidosis  Pt. with metabolic acidosis in the setting of volume overload and renal failure. improving pH 7.44 <-- 7.18 <-- 7.19   - iHD per nephro, as outlined above.   - Monitor SCO2    =====Endocrine=====  #DM2  - FSBG   - MISS <-- ADRIAN given elevated blood glucose.   - Basal insulin held 9/26 AM d/t  low POCT 80    #Hypothyroidism  - TSH 4.58; C/w 125 synthroid daily    =====Infectious Disease=====  Pt hypothermic on oral and rectal temp (unable to obtain temp on thermometer)  - Temps 95-97 range  - Urine and Blood Cx NGTD    =====Heme/Onc=====  #DVT Ppx  - DC heparin 5000 subQ  - SCD initiated    #Anemia  - Hgb 7-8, not clinically bleeding. Likely in s/o renal disease.   - CTM, maintain Hgb >7 (6.9 -->8.2 on 9/25 s/p HD and 1 unit pRBC)    =====Ethics=====  FULL CODE

## 2023-09-24 NOTE — PROGRESS NOTE ADULT - SUBJECTIVE AND OBJECTIVE BOX
[facility] Division of Kidney Diseases & Hypertension  FOLLOW UP NOTE  [phone]--------------------------------------------------------------------------------    Chief Complaint: ISAMAR on CKD, Fluid Overload    24 hour events/subjective: Pt. was seen and evaluated in the MICU this morning. Resting comfortably, reports fatigue. Otherwise feels well. Received 2nd HD treatment yesterday (9/23), was well tolerated.      PAST HISTORY  --------------------------------------------------------------------------------  No significant changes to PMH, PSH, FHx, SHx, unless otherwise noted    ALLERGIES & MEDICATIONS  --------------------------------------------------------------------------------  Allergies    No Known Allergies    Intolerances      Standing Inpatient Medications  atorvastatin 40 milliGRAM(s) Oral at bedtime  chlorhexidine 2% Cloths 1 Application(s) Topical <User Schedule>  dextrose 5%. 1000 milliLiter(s) IV Continuous <Continuous>  dextrose 5%. 1000 milliLiter(s) IV Continuous <Continuous>  dextrose 50% Injectable 25 Gram(s) IV Push once  dextrose 50% Injectable 25 Gram(s) IV Push once  dextrose 50% Injectable 12.5 Gram(s) IV Push once  glucagon  Injectable 1 milliGRAM(s) IntraMuscular once  heparin   Injectable 5000 Unit(s) SubCutaneous every 8 hours  hydrALAZINE 100 milliGRAM(s) Oral three times a day  insulin lispro (ADMELOG) corrective regimen sliding scale   SubCutaneous at bedtime  insulin lispro (ADMELOG) corrective regimen sliding scale   SubCutaneous three times a day before meals  isosorbide   dinitrate Tablet (ISORDIL) 10 milliGRAM(s) Oral three times a day  levothyroxine 125 MICROGram(s) Oral daily  NIFEdipine XL 60 milliGRAM(s) Oral daily  pantoprazole    Tablet 40 milliGRAM(s) Oral before breakfast  sodium bicarbonate 650 milliGRAM(s) Oral three times a day    PRN Inpatient Medications  dextrose Oral Gel 15 Gram(s) Oral once PRN  melatonin 3 milliGRAM(s) Oral at bedtime PRN      REVIEW OF SYSTEMS  --------------------------------------------------------------------------------  Gen: No fevers/chills,  Skin: No rash  Head/Eyes/Ears: No HA   Respiratory: +Dyspnea (improved)  CV: No chest pain  GI: No abdominal pain, diarrhea  : +Decreased UOP  MSK: +B/L LE edema (improving)  Heme: No easy bruising or bleeding  Psych: No significant depression    All other systems were reviewed and are negative, except as noted.    VITALS/PHYSICAL EXAM  --------------------------------------------------------------------------------  T(C): 35.4 (09-24-23 @ 04:00), Max: 35.8 (09-23-23 @ 23:00)  HR: 80 (09-24-23 @ 07:04) (67 - 91)  BP: 113/61 (09-24-23 @ 06:00) (98/65 - 149/69)  RR: 21 (09-24-23 @ 06:00) (12 - 26)  SpO2: 94% (09-24-23 @ 07:04) (92% - 100%)  Wt(kg): --    Weight (kg): 60.7 (09-22-23 @ 13:07)      09-23-23 @ 07:01  -  09-24-23 @ 07:00  --------------------------------------------------------  IN: 728 mL / OUT: 1500 mL / NET: -772 mL    Physical Exam:  Gen: resting, NAD   HEENT: Receiving supplemental oxygen via nasal cannula  Pulm: CTABL  CV: S1S2+  Abd: Soft, +BS   Ext: +B/L LE pitting edema (resolved)  Neuro: Awake and alert  Skin: Warm and dry  Vascular access: +Right IJ non-tunneled HD catheter    LABS/STUDIES  --------------------------------------------------------------------------------              7.7    6.67  >-----------<  228      [09-24-23 @ 02:28]              23.4     136  |  94  |  52  ----------------------------<  144      [09-24-23 @ 02:28]  4.9   |  19  |  2.58        Ca     8.8     [09-24-23 @ 02:28]      iCa    1.03     [09-24 @ 02:28]      Mg     2.40     [09-24-23 @ 02:28]      Phos  5.4     [09-24-23 @ 02:28]    TPro  6.9  /  Alb  3.7  /  TBili  1.0  /  DBili  x   /  AST  63  /  ALT  32  /  AlkPhos  900  [09-24-23 @ 02:28]    PT/INR: PT 17.3 , INR 1.56       [09-24-23 @ 02:28]  PTT: 73.9       [09-24-23 @ 02:28]    Creatinine Trend:  SCr 2.58 [09-24 @ 02:28]  SCr 3.37 [09-23 @ 12:05]  SCr 3.31 [09-23 @ 01:10]  SCr 3.18 [09-22 @ 22:25]  SCr 3.89 [09-22 @ 08:04]    TSH 4.58      [09-22-23 @ 08:04]    HBsAb <3.0      [09-22-23 @ 17:55]  HBsAg Nonreact      [09-22-23 @ 17:55]  HCV 0.10, Nonreact      [09-22-23 @ 17:55]

## 2023-09-24 NOTE — CONSULT NOTE ADULT - SUBJECTIVE AND OBJECTIVE BOX
CARDIOLOGY FELLOW CONSULT NOTE    Consulting service: MICU  Reason for consult: R/o tamponade    HPI:  Ms. 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 She reports this morning she had acute shortness of breath at rest with associated weakness. She reports having left sided chest tightness that worsened when laying flat and in the decubitus position. She reports SOB on exertion as well. She reports over last few days she has had non-productive cough, decrease PO intake, nausea this morning, and diarrhea (without blood) this morning. She reports not eating much over the last few days with decrease PO intake. She reports taking her bumex today (both AM and PM). She was placed on BIPAP in the ED. CXR was sig for the following: small bilateral pleural effusions with prominent interstitial markings bilaterally. Labs were sig for the following: BUN/Cr 110/3.92, Na 122 (last 133 in August), CO2 15, AG 20, -290, d-dimer 3509, WBC 9.19, Hb 8.9/26.7, (8.2/1245), MCV 75.4, RDW 14.6, neutrophil count 87.1%, alk phos 1093, pro-BNP 2092, LA 0.9, pCO2 36. She denies any abdominal pain, dysuria, urinary frequency, fever or chills.     Patient minimally interactive during my exam, thus limited history. Pt notes she is feeling tired and SOB. Denies any CP or dizziness.     PMH:   Benign essential HTN  HTN (hypertension)  HLD (hyperlipidemia)  DM (diabetes mellitus)  Hypothyroid  H/O pulmonary hypertension  CKD (chronic kidney disease)  (HFpEF) heart failure with preserved ejection fraction    PSH:   S/P cataract surgery    FAMILY HISTORY:  FH: stroke (Father)    Allergies:  No Known Allergies    Home Meds:    Current Medications:   atorvastatin 40 milliGRAM(s) Oral at bedtime  chlorhexidine 2% Cloths 1 Application(s) Topical <User Schedule>  dextrose 5%. 1000 milliLiter(s) IV Continuous <Continuous>  dextrose 5%. 1000 milliLiter(s) IV Continuous <Continuous>  dextrose 50% Injectable 25 Gram(s) IV Push once  dextrose 50% Injectable 50 milliLiter(s) IV Push once  dextrose 50% Injectable 12.5 Gram(s) IV Push once  dextrose 50% Injectable 25 Gram(s) IV Push once  dextrose Oral Gel 15 Gram(s) Oral once PRN  glucagon  Injectable 1 milliGRAM(s) IntraMuscular once  heparin   Injectable 5000 Unit(s) SubCutaneous every 8 hours  hydrALAZINE 100 milliGRAM(s) Oral three times a day  insulin lispro (ADMELOG) corrective regimen sliding scale   SubCutaneous three times a day before meals  insulin lispro (ADMELOG) corrective regimen sliding scale   SubCutaneous at bedtime  isosorbide   dinitrate Tablet (ISORDIL) 10 milliGRAM(s) Oral three times a day  levothyroxine 125 MICROGram(s) Oral daily  melatonin 3 milliGRAM(s) Oral at bedtime PRN  NIFEdipine XL 60 milliGRAM(s) Oral daily  norepinephrine Infusion 0.05 MICROgram(s)/kG/Min IV Continuous <Continuous>  ondansetron Injectable 4 milliGRAM(s) IV Push once  pantoprazole    Tablet 40 milliGRAM(s) Oral before breakfast  sodium bicarbonate 650 milliGRAM(s) Oral three times a day  sodium chloride 0.9% Bolus 1000 milliLiter(s) IV Bolus once    REVIEW OF SYSTEMS:  All other review of systems is negative unless indicated above.    Physical Exam:  T(F): 96.5 (09-24), Max: 96.5 (09-23)  HR: 135 (09-24) (61 - 135)  BP: 123/67 (09-24) (60/46 - 146/79)  RR: 20 (09-24)  SpO2: 91% (09-24)  GENERAL: No acute distress, well-developed  HEAD:  Atraumatic, Normocephalic  ENT: EOMI, PERRLA, conjunctiva and sclera clear, Neck supple, , moist mucosa  CHEST/LUNG: Clear to auscultation bilaterally; No wheeze, equal breath sounds bilaterally   HEART: Tachycardic regular rhythm, intermittent extra beats, no m/g/r, JVD to mandible  ABDOMEN: Soft, Nontender, Nondistended; Bowel sounds present  EXTREMITIES:  No clubbing, cyanosis, or edema  PSYCH: Nl behavior, nl affect  NEUROLOGY: AAOx3, non-focal, cranial nerves intact  SKIN: Normal color, No rashes or lesions      ECG: Personally reviewed    Echo:   CONCLUSIONS:      1. Left ventricular systolic function is normal with an ejection fraction of 55 % by Macedo's method of disks.   2. Based on visual assessment, the right ventricle appears mildly enlarged. reduced systolic function.   3. Borderline left ventricular hypertrophy.   4. Large pericardial effusion.   5. There is a large circumferential pericardial effusion measuring 1.8 cm posterior to the left ventricle in the parasternal view and appearsto measure ~1.5 cm anterior to the right ventricle in the subcostal view. The right ventricle does not appear to completely expand during diastole however there is no overt diastolic collapse. There is 23% respirophasic variation across the mitral valve. These findings are consistent with increased intrapericardial pressure however there is no evidence of overt tamponade physiology.      Cath:   Diagnostic Conclusions:   Elevated filling pressures on RHC.    RA 17 mmHg. PA 51/22/36 mmHg. PCW 25 mmHg. CO 6.69 L/min with CI 4.25  L/min/m2 (Hgb 6.8).  dsc.      CXR: Personally reviewed    Labs: Personally reviewed                        7.7    6.67  )-----------( 228      ( 24 Sep 2023 02:28 )             23.4     09-24    136  |  94<L>  |  52<H>  ----------------------------<  144<H>  4.9   |  19<L>  |  2.58<H>    Ca    8.8      24 Sep 2023 02:28  Phos  5.4     09-24  Mg     2.40     09-24    TPro  6.9  /  Alb  3.7  /  TBili  1.0  /  DBili  x   /  AST  63<H>  /  ALT  32  /  AlkPhos  900<H>  09-24    PT/INR - ( 24 Sep 2023 02:28 )   PT: 17.3 sec;   INR: 1.56 ratio         PTT - ( 24 Sep 2023 02:28 )  PTT:73.9 sec    CARDIAC MARKERS ( 22 Sep 2023 01:13 )  56 ng/L / x     / x     / 77 U/L / x     / 18.3 ng/mL  CARDIAC MARKERS ( 20 Sep 2023 19:52 )  64 ng/L / x     / x     / x     / x     / x      CARDIAC MARKERS ( 20 Sep 2023 17:46 )  65 ng/L / x     / x     / x     / x     / x        Thyroid Stimulating Hormone, Serum: 4.58 uIU/mL (09-22 @ 08:04)    A/P  59 yo F w/ HTN, HLD, DM2, CKD (stage 3-4), hypothyroidism (c/b myxedema coma in past), severe pulmonary hypertension, mod-severe TR, asthma, HFpEF, presents for progressive dyspnea at rest with left sided chest tightness. Cardiology consulted for r/o tamponade.    #Pericardial Effusion  #R/o Tamponade  #HFpEF  #Mod-Sev TR  #PH  - Pt with known HFpEF p/w vol overload and chronic pericardial effusion cardiology consulted for c/f tamponade  - Initial TTE: There is a large circumferential pericardial effusion measuring 1.8 cm posterior to the left ventricle in the parasternal view and appearsto measure ~1.5 cm anterior to the right ventricle in the subcostal view. The right ventricle does not appear to completely expand during diastole however there is no overt diastolic collapse. There is 23% respirophasic variation across the mitral valve. These findings are consistent with increased intrapericardial pressure however there is no evidence of overt tamponade physiology.  - Pt decompensated overnight 9/23 with hypotension requiring pressors and LA elevation 0.8 --> 5 --> 7  - Repeat limited TTE c/f early signs of tamponade per echo attending   Plan:  - Imaging reviewed with interventionalist on call - pt appears to have a markedly diminished LV cavity and c/f OSVALDO. She likely has a multifactorial presentation (limited preload after HD, large effusion, OSVALDO, small cavity). Would likely benefit more from pure afterload reduction with neosynephrine and more IVF with pre and post LVOT gradients. If pt still not improving can consider pericardiocentesis at that time.        Signed by:  Cody Armstrong PGY5  Cardiology Fellow    [facility]    CARDIOLOGY FELLOW CONSULT NOTE    Consulting service: MICU  Reason for consult: R/o tamponade    HPI:  Ms. Hernandez is a 57 yo F w/ HTN, HLD, DM2, CKD (stage 3-4), hypothyroidism (c/b myxedema coma in past), severe pulmonary hypertension, mod-severe TR, asthma, HFpEF, presents for progressive dyspnea at rest with left sided chest tightness that started this morning. She reports this morning she had acute shortness of breath at rest with associated weakness. She reports having left sided chest tightness that worsened when laying flat and in the decubitus position. She reports SOB on exertion as well. She reports over last few days she has had non-productive cough, decrease PO intake, nausea this morning, and diarrhea (without blood) this morning. She reports not eating much over the last few days with decrease PO intake. She reports taking her bumex today (both AM and PM). She was placed on BIPAP in the ED. CXR was sig for the following: small bilateral pleural effusions with prominent interstitial markings bilaterally. Labs were sig for the following: BUN/Cr 110/3.92, Na 122 (last 133 in August), CO2 15, AG 20, -290, d-dimer 3509, WBC 9.19, Hb 8.9/26.7, (8.2/1245), MCV 75.4, RDW 14.6, neutrophil count 87.1%, alk phos 1093, pro-BNP 2092, LA 0.9, pCO2 36. She denies any abdominal pain, dysuria, urinary frequency, fever or chills.     Patient minimally interactive during my exam, thus limited history. Pt notes she is feeling tired and SOB. Denies any CP or dizziness.     PMH:   Benign essential HTN  HTN (hypertension)  HLD (hyperlipidemia)  DM (diabetes mellitus)  Hypothyroid  H/O pulmonary hypertension  CKD (chronic kidney disease)  (HFpEF) heart failure with preserved ejection fraction    PSH:   S/P cataract surgery    FAMILY HISTORY:  FH: stroke (Father)    Allergies:  No Known Allergies    Home Meds:    Current Medications:   atorvastatin 40 milliGRAM(s) Oral at bedtime  chlorhexidine 2% Cloths 1 Application(s) Topical <User Schedule>  dextrose 5%. 1000 milliLiter(s) IV Continuous <Continuous>  dextrose 5%. 1000 milliLiter(s) IV Continuous <Continuous>  dextrose 50% Injectable 25 Gram(s) IV Push once  dextrose 50% Injectable 50 milliLiter(s) IV Push once  dextrose 50% Injectable 12.5 Gram(s) IV Push once  dextrose 50% Injectable 25 Gram(s) IV Push once  dextrose Oral Gel 15 Gram(s) Oral once PRN  glucagon  Injectable 1 milliGRAM(s) IntraMuscular once  heparin   Injectable 5000 Unit(s) SubCutaneous every 8 hours  hydrALAZINE 100 milliGRAM(s) Oral three times a day  insulin lispro (ADMELOG) corrective regimen sliding scale   SubCutaneous three times a day before meals  insulin lispro (ADMELOG) corrective regimen sliding scale   SubCutaneous at bedtime  isosorbide   dinitrate Tablet (ISORDIL) 10 milliGRAM(s) Oral three times a day  levothyroxine 125 MICROGram(s) Oral daily  melatonin 3 milliGRAM(s) Oral at bedtime PRN  NIFEdipine XL 60 milliGRAM(s) Oral daily  norepinephrine Infusion 0.05 MICROgram(s)/kG/Min IV Continuous <Continuous>  ondansetron Injectable 4 milliGRAM(s) IV Push once  pantoprazole    Tablet 40 milliGRAM(s) Oral before breakfast  sodium bicarbonate 650 milliGRAM(s) Oral three times a day  sodium chloride 0.9% Bolus 1000 milliLiter(s) IV Bolus once    REVIEW OF SYSTEMS:  All other review of systems is negative unless indicated above.    Physical Exam:  T(F): 96.5 (09-24), Max: 96.5 (09-23)  HR: 135 (09-24) (61 - 135)  BP: 123/67 (09-24) (60/46 - 146/79)  RR: 20 (09-24)  SpO2: 91% (09-24)  GENERAL: No acute distress, well-developed  HEAD:  Atraumatic, Normocephalic  ENT: EOMI, PERRLA, conjunctiva and sclera clear, Neck supple, , moist mucosa  CHEST/LUNG: Clear to auscultation bilaterally; No wheeze, equal breath sounds bilaterally   HEART: Tachycardic regular rhythm, intermittent extra beats, no m/g/r, JVD to mandible  ABDOMEN: Soft, Nontender, Nondistended; Bowel sounds present  EXTREMITIES:  No clubbing, cyanosis, or edema  PSYCH: Nl behavior, nl affect  NEUROLOGY: AAOx3, non-focal, cranial nerves intact  SKIN: Normal color, No rashes or lesions      ECG: Personally reviewed    Echo:   CONCLUSIONS:      1. Left ventricular systolic function is normal with an ejection fraction of 55 % by Macedo's method of disks.   2. Based on visual assessment, the right ventricle appears mildly enlarged. reduced systolic function.   3. Borderline left ventricular hypertrophy.   4. Large pericardial effusion.   5. There is a large circumferential pericardial effusion measuring 1.8 cm posterior to the left ventricle in the parasternal view and appearsto measure ~1.5 cm anterior to the right ventricle in the subcostal view. The right ventricle does not appear to completely expand during diastole however there is no overt diastolic collapse. There is 23% respirophasic variation across the mitral valve. These findings are consistent with increased intrapericardial pressure however there is no evidence of overt tamponade physiology.      Cath:   Diagnostic Conclusions:   Elevated filling pressures on RHC.    RA 17 mmHg. PA 51/22/36 mmHg. PCW 25 mmHg. CO 6.69 L/min with CI 4.25  L/min/m2 (Hgb 6.8).  dsc.      CXR: Personally reviewed    Labs: Personally reviewed                        7.7    6.67  )-----------( 228      ( 24 Sep 2023 02:28 )             23.4     09-24    136  |  94<L>  |  52<H>  ----------------------------<  144<H>  4.9   |  19<L>  |  2.58<H>    Ca    8.8      24 Sep 2023 02:28  Phos  5.4     09-24  Mg     2.40     09-24    TPro  6.9  /  Alb  3.7  /  TBili  1.0  /  DBili  x   /  AST  63<H>  /  ALT  32  /  AlkPhos  900<H>  09-24    PT/INR - ( 24 Sep 2023 02:28 )   PT: 17.3 sec;   INR: 1.56 ratio         PTT - ( 24 Sep 2023 02:28 )  PTT:73.9 sec    CARDIAC MARKERS ( 22 Sep 2023 01:13 )  56 ng/L / x     / x     / 77 U/L / x     / 18.3 ng/mL  CARDIAC MARKERS ( 20 Sep 2023 19:52 )  64 ng/L / x     / x     / x     / x     / x      CARDIAC MARKERS ( 20 Sep 2023 17:46 )  65 ng/L / x     / x     / x     / x     / x        Thyroid Stimulating Hormone, Serum: 4.58 uIU/mL (09-22 @ 08:04)    A/P  57 yo F w/ HTN, HLD, DM2, CKD (stage 3-4), hypothyroidism (c/b myxedema coma in past), severe pulmonary hypertension, mod-severe TR, asthma, HFpEF, presents for progressive dyspnea at rest with left sided chest tightness. Cardiology consulted for r/o tamponade.    #Pericardial Effusion  #R/o Tamponade  #HFpEF  #Mod-Sev TR  #PH  - Pt with known HFpEF p/w vol overload and chronic pericardial effusion cardiology consulted for c/f tamponade  - Initial TTE: There is a large circumferential pericardial effusion measuring 1.8 cm posterior to the left ventricle in the parasternal view and appearsto measure ~1.5 cm anterior to the right ventricle in the subcostal view. The right ventricle does not appear to completely expand during diastole however there is no overt diastolic collapse. There is 23% respirophasic variation across the mitral valve. These findings are consistent with increased intrapericardial pressure however there is no evidence of overt tamponade physiology.  - Pt decompensated overnight 9/23 with hypotension requiring pressors and LA elevation 0.8 --> 5 --> 7  - Repeat limited TTE c/f early signs of tamponade per echo attending   Plan:  - Imaging reviewed with interventionalist on call - pt appears to have a markedly diminished LV cavity and c/f OSVALDO. She likely has a multifactorial presentation (limited preload after HD, large effusion, OSVALDO, small cavity). Would likely benefit more from pure afterload reduction with neosynephrine and more IVF with pre and post LVOT gradients. If pt still not improving can consider pericardiocentesis at that time.  - Initial LVOT velocity on levo: VMax 148 V mean 94.5 VTI 18.5 @ 11:39  - Repeat:****      Signed by:  Cody Armstrong PGY5  Cardiology Fellow    [facility]    CARDIOLOGY FELLOW CONSULT NOTE    Consulting service: MICU  Reason for consult: R/o tamponade    HPI:  Ms. 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 She reports this morning she had acute shortness of breath at rest with associated weakness. She reports having left sided chest tightness that worsened when laying flat and in the decubitus position. She reports SOB on exertion as well. She reports over last few days she has had non-productive cough, decrease PO intake, nausea this morning, and diarrhea (without blood) this morning. She reports not eating much over the last few days with decrease PO intake. She reports taking her bumex today (both AM and PM). She was placed on BIPAP in the ED. CXR was sig for the following: small bilateral pleural effusions with prominent interstitial markings bilaterally. Labs were sig for the following: BUN/Cr 110/3.92, Na 122 (last 133 in August), CO2 15, AG 20, -290, d-dimer 3509, WBC 9.19, Hb 8.9/26.7, (8.2/1245), MCV 75.4, RDW 14.6, neutrophil count 87.1%, alk phos 1093, pro-BNP 2092, LA 0.9, pCO2 36. She denies any abdominal pain, dysuria, urinary frequency, fever or chills.     Patient minimally interactive during my exam, thus limited history. Pt notes she is feeling tired and SOB. Denies any CP or dizziness.     PMH:   Benign essential HTN  HTN (hypertension)  HLD (hyperlipidemia)  DM (diabetes mellitus)  Hypothyroid  H/O pulmonary hypertension  CKD (chronic kidney disease)  (HFpEF) heart failure with preserved ejection fraction    PSH:   S/P cataract surgery    FAMILY HISTORY:  FH: stroke (Father)    Allergies:  No Known Allergies    Home Meds:    Current Medications:   atorvastatin 40 milliGRAM(s) Oral at bedtime  chlorhexidine 2% Cloths 1 Application(s) Topical <User Schedule>  dextrose 5%. 1000 milliLiter(s) IV Continuous <Continuous>  dextrose 5%. 1000 milliLiter(s) IV Continuous <Continuous>  dextrose 50% Injectable 25 Gram(s) IV Push once  dextrose 50% Injectable 50 milliLiter(s) IV Push once  dextrose 50% Injectable 12.5 Gram(s) IV Push once  dextrose 50% Injectable 25 Gram(s) IV Push once  dextrose Oral Gel 15 Gram(s) Oral once PRN  glucagon  Injectable 1 milliGRAM(s) IntraMuscular once  heparin   Injectable 5000 Unit(s) SubCutaneous every 8 hours  hydrALAZINE 100 milliGRAM(s) Oral three times a day  insulin lispro (ADMELOG) corrective regimen sliding scale   SubCutaneous three times a day before meals  insulin lispro (ADMELOG) corrective regimen sliding scale   SubCutaneous at bedtime  isosorbide   dinitrate Tablet (ISORDIL) 10 milliGRAM(s) Oral three times a day  levothyroxine 125 MICROGram(s) Oral daily  melatonin 3 milliGRAM(s) Oral at bedtime PRN  NIFEdipine XL 60 milliGRAM(s) Oral daily  norepinephrine Infusion 0.05 MICROgram(s)/kG/Min IV Continuous <Continuous>  ondansetron Injectable 4 milliGRAM(s) IV Push once  pantoprazole    Tablet 40 milliGRAM(s) Oral before breakfast  sodium bicarbonate 650 milliGRAM(s) Oral three times a day  sodium chloride 0.9% Bolus 1000 milliLiter(s) IV Bolus once    REVIEW OF SYSTEMS:  All other review of systems is negative unless indicated above.    Physical Exam:  T(F): 96.5 (09-24), Max: 96.5 (09-23)  HR: 135 (09-24) (61 - 135)  BP: 123/67 (09-24) (60/46 - 146/79)  RR: 20 (09-24)  SpO2: 91% (09-24)  GENERAL: No acute distress, well-developed  HEAD:  Atraumatic, Normocephalic  ENT: EOMI, PERRLA, conjunctiva and sclera clear, Neck supple, , moist mucosa  CHEST/LUNG: Clear to auscultation bilaterally; No wheeze, equal breath sounds bilaterally   HEART: Tachycardic regular rhythm, intermittent extra beats, no m/g/r, JVD to mandible  ABDOMEN: Soft, Nontender, Nondistended; Bowel sounds present  EXTREMITIES:  No clubbing, cyanosis, or edema  PSYCH: Nl behavior, nl affect  NEUROLOGY: AAOx3, non-focal, cranial nerves intact  SKIN: Normal color, No rashes or lesions      ECG: Personally reviewed    Echo:   CONCLUSIONS:      1. Left ventricular systolic function is normal with an ejection fraction of 55 % by Macedo's method of disks.   2. Based on visual assessment, the right ventricle appears mildly enlarged. reduced systolic function.   3. Borderline left ventricular hypertrophy.   4. Large pericardial effusion.   5. There is a large circumferential pericardial effusion measuring 1.8 cm posterior to the left ventricle in the parasternal view and appearsto measure ~1.5 cm anterior to the right ventricle in the subcostal view. The right ventricle does not appear to completely expand during diastole however there is no overt diastolic collapse. There is 23% respirophasic variation across the mitral valve. These findings are consistent with increased intrapericardial pressure however there is no evidence of overt tamponade physiology.      Cath:   Diagnostic Conclusions:   Elevated filling pressures on RHC.    RA 17 mmHg. PA 51/22/36 mmHg. PCW 25 mmHg. CO 6.69 L/min with CI 4.25  L/min/m2 (Hgb 6.8).  dsc.      CXR: Personally reviewed    Labs: Personally reviewed                        7.7    6.67  )-----------( 228      ( 24 Sep 2023 02:28 )             23.4     09-24    136  |  94<L>  |  52<H>  ----------------------------<  144<H>  4.9   |  19<L>  |  2.58<H>    Ca    8.8      24 Sep 2023 02:28  Phos  5.4     09-24  Mg     2.40     09-24    TPro  6.9  /  Alb  3.7  /  TBili  1.0  /  DBili  x   /  AST  63<H>  /  ALT  32  /  AlkPhos  900<H>  09-24    PT/INR - ( 24 Sep 2023 02:28 )   PT: 17.3 sec;   INR: 1.56 ratio         PTT - ( 24 Sep 2023 02:28 )  PTT:73.9 sec    CARDIAC MARKERS ( 22 Sep 2023 01:13 )  56 ng/L / x     / x     / 77 U/L / x     / 18.3 ng/mL  CARDIAC MARKERS ( 20 Sep 2023 19:52 )  64 ng/L / x     / x     / x     / x     / x      CARDIAC MARKERS ( 20 Sep 2023 17:46 )  65 ng/L / x     / x     / x     / x     / x        Thyroid Stimulating Hormone, Serum: 4.58 uIU/mL (09-22 @ 08:04)    A/P  59 yo F w/ HTN, HLD, DM2, CKD (stage 3-4), hypothyroidism (c/b myxedema coma in past), severe pulmonary hypertension, mod-severe TR, asthma, HFpEF, presents for progressive dyspnea at rest with left sided chest tightness. Cardiology consulted for r/o tamponade.    #Pericardial Effusion  #R/o Tamponade  #HFpEF  #Mod-Sev TR  #PH  - Pt with known HFpEF p/w vol overload and chronic pericardial effusion cardiology consulted for c/f tamponade  - Initial TTE: There is a large circumferential pericardial effusion measuring 1.8 cm posterior to the left ventricle in the parasternal view and appearsto measure ~1.5 cm anterior to the right ventricle in the subcostal view. The right ventricle does not appear to completely expand during diastole however there is no overt diastolic collapse. There is 23% respirophasic variation across the mitral valve. These findings are consistent with increased intrapericardial pressure however there is no evidence of overt tamponade physiology.  - Pt decompensated overnight 9/23 with hypotension requiring pressors and LA elevation 0.8 --> 5 --> 7  - Repeat limited TTE c/f early signs of tamponade per echo attending   Plan:  - Imaging reviewed with interventionalist on call - pt appears to have a markedly diminished LV cavity and c/f OSVALDO. She likely has a multifactorial presentation (limited preload after HD, large effusion, OSVALDO, small cavity). Would likely benefit more from pure afterload reduction with neosynephrine and more IVF with pre and post LVOT gradients. If pt still not improving can consider pericardiocentesis at that time.  - Initial LVOT velocity on levo 0.35 at 11:39: VMax 148 V mean 94.5 VTI 18.5 @ 11:39  - Repeat after 1.5L IVF on jamie 5 mcg at 12:45: VMax 145 v mean 93.8 VTI 27.9  - LA increasing to 10 --> 13  - Re-discussed case with Dr. Corrales, will activate cath lab for emergent pericardiocentesis       Signed by:  Cody Armstrong PGY5  Cardiology Fellow    [facility]    CARDIOLOGY FELLOW CONSULT NOTE    Consulting service: MICU  Reason for consult: R/o tamponade    HPI:  Ms. 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 She reports this morning she had acute shortness of breath at rest with associated weakness. She reports having left sided chest tightness that worsened when laying flat and in the decubitus position. She reports SOB on exertion as well. She reports over last few days she has had non-productive cough, decrease PO intake, nausea this morning, and diarrhea (without blood) this morning. She reports not eating much over the last few days with decrease PO intake. She reports taking her bumex today (both AM and PM). She was placed on BIPAP in the ED. CXR was sig for the following: small bilateral pleural effusions with prominent interstitial markings bilaterally. Labs were sig for the following: BUN/Cr 110/3.92, Na 122 (last 133 in August), CO2 15, AG 20, -290, d-dimer 3509, WBC 9.19, Hb 8.9/26.7, (8.2/1245), MCV 75.4, RDW 14.6, neutrophil count 87.1%, alk phos 1093, pro-BNP 2092, LA 0.9, pCO2 36. She denies any abdominal pain, dysuria, urinary frequency, fever or chills.     Patient minimally interactive during my exam, thus limited history. Pt notes she is feeling tired and SOB. Denies any CP or dizziness.     PMH:   Benign essential HTN  HTN (hypertension)  HLD (hyperlipidemia)  DM (diabetes mellitus)  Hypothyroid  H/O pulmonary hypertension  CKD (chronic kidney disease)  (HFpEF) heart failure with preserved ejection fraction    PSH:   S/P cataract surgery    FAMILY HISTORY:  FH: stroke (Father)    Allergies:  No Known Allergies    Home Meds:    Current Medications:   atorvastatin 40 milliGRAM(s) Oral at bedtime  chlorhexidine 2% Cloths 1 Application(s) Topical <User Schedule>  dextrose 5%. 1000 milliLiter(s) IV Continuous <Continuous>  dextrose 5%. 1000 milliLiter(s) IV Continuous <Continuous>  dextrose 50% Injectable 25 Gram(s) IV Push once  dextrose 50% Injectable 50 milliLiter(s) IV Push once  dextrose 50% Injectable 12.5 Gram(s) IV Push once  dextrose 50% Injectable 25 Gram(s) IV Push once  dextrose Oral Gel 15 Gram(s) Oral once PRN  glucagon  Injectable 1 milliGRAM(s) IntraMuscular once  heparin   Injectable 5000 Unit(s) SubCutaneous every 8 hours  hydrALAZINE 100 milliGRAM(s) Oral three times a day  insulin lispro (ADMELOG) corrective regimen sliding scale   SubCutaneous three times a day before meals  insulin lispro (ADMELOG) corrective regimen sliding scale   SubCutaneous at bedtime  isosorbide   dinitrate Tablet (ISORDIL) 10 milliGRAM(s) Oral three times a day  levothyroxine 125 MICROGram(s) Oral daily  melatonin 3 milliGRAM(s) Oral at bedtime PRN  NIFEdipine XL 60 milliGRAM(s) Oral daily  norepinephrine Infusion 0.05 MICROgram(s)/kG/Min IV Continuous <Continuous>  ondansetron Injectable 4 milliGRAM(s) IV Push once  pantoprazole    Tablet 40 milliGRAM(s) Oral before breakfast  sodium bicarbonate 650 milliGRAM(s) Oral three times a day  sodium chloride 0.9% Bolus 1000 milliLiter(s) IV Bolus once    REVIEW OF SYSTEMS:  All other review of systems is negative unless indicated above.    Physical Exam:  T(F): 96.5 (09-24), Max: 96.5 (09-23)  HR: 135 (09-24) (61 - 135)  BP: 123/67 (09-24) (60/46 - 146/79)  RR: 20 (09-24)  SpO2: 91% (09-24)  GENERAL: No acute distress, well-developed  HEAD:  Atraumatic, Normocephalic  ENT: EOMI, PERRLA, conjunctiva and sclera clear, Neck supple, , moist mucosa  CHEST/LUNG: Clear to auscultation bilaterally; No wheeze, equal breath sounds bilaterally   HEART: Tachycardic regular rhythm, intermittent extra beats, no m/g/r, JVD to mandible  ABDOMEN: Soft, Nontender, Nondistended; Bowel sounds present  EXTREMITIES:  No clubbing, cyanosis, or edema  PSYCH: Nl behavior, nl affect  NEUROLOGY: AAOx3, non-focal, cranial nerves intact  SKIN: Normal color, No rashes or lesions      ECG: Personally reviewed    Echo:   CONCLUSIONS:      1. Left ventricular systolic function is normal with an ejection fraction of 55 % by Macedo's method of disks.   2. Based on visual assessment, the right ventricle appears mildly enlarged. reduced systolic function.   3. Borderline left ventricular hypertrophy.   4. Large pericardial effusion.   5. There is a large circumferential pericardial effusion measuring 1.8 cm posterior to the left ventricle in the parasternal view and appearsto measure ~1.5 cm anterior to the right ventricle in the subcostal view. The right ventricle does not appear to completely expand during diastole however there is no overt diastolic collapse. There is 23% respirophasic variation across the mitral valve. These findings are consistent with increased intrapericardial pressure however there is no evidence of overt tamponade physiology.      Cath:   Diagnostic Conclusions:   Elevated filling pressures on RHC.    RA 17 mmHg. PA 51/22/36 mmHg. PCW 25 mmHg. CO 6.69 L/min with CI 4.25  L/min/m2 (Hgb 6.8).  dsc.      CXR: Personally reviewed    Labs: Personally reviewed                        7.7    6.67  )-----------( 228      ( 24 Sep 2023 02:28 )             23.4     09-24    136  |  94<L>  |  52<H>  ----------------------------<  144<H>  4.9   |  19<L>  |  2.58<H>    Ca    8.8      24 Sep 2023 02:28  Phos  5.4     09-24  Mg     2.40     09-24    TPro  6.9  /  Alb  3.7  /  TBili  1.0  /  DBili  x   /  AST  63<H>  /  ALT  32  /  AlkPhos  900<H>  09-24    PT/INR - ( 24 Sep 2023 02:28 )   PT: 17.3 sec;   INR: 1.56 ratio         PTT - ( 24 Sep 2023 02:28 )  PTT:73.9 sec    CARDIAC MARKERS ( 22 Sep 2023 01:13 )  56 ng/L / x     / x     / 77 U/L / x     / 18.3 ng/mL  CARDIAC MARKERS ( 20 Sep 2023 19:52 )  64 ng/L / x     / x     / x     / x     / x      CARDIAC MARKERS ( 20 Sep 2023 17:46 )  65 ng/L / x     / x     / x     / x     / x        Thyroid Stimulating Hormone, Serum: 4.58 uIU/mL (09-22 @ 08:04)    A/P  59 yo F w/ HTN, HLD, DM2, CKD (stage 3-4), hypothyroidism (c/b myxedema coma in past), severe pulmonary hypertension, mod-severe TR, asthma, HFpEF, presents for progressive dyspnea at rest with left sided chest tightness. Cardiology consulted for r/o tamponade.    #Pericardial Effusion  #R/o Tamponade  #HFpEF  #Mod-Sev TR  #PH  - Pt with known HFpEF p/w vol overload and chronic pericardial effusion cardiology consulted for c/f tamponade  - Initial TTE: There is a large circumferential pericardial effusion measuring 1.8 cm posterior to the left ventricle in the parasternal view and appearsto measure ~1.5 cm anterior to the right ventricle in the subcostal view. The right ventricle does not appear to completely expand during diastole however there is no overt diastolic collapse. There is 23% respirophasic variation across the mitral valve. These findings are consistent with increased intrapericardial pressure however there is no evidence of overt tamponade physiology.  - Pt decompensated overnight 9/23 with hypotension requiring pressors and LA elevation 0.8 --> 5 --> 7  - Repeat limited TTE c/f early signs of tamponade per echo attending   Plan:  - Imaging reviewed with interventionalist on call - pt appears to have a markedly diminished LV cavity and c/f OSVALDO. She likely has a multifactorial presentation (limited preload after HD, large effusion, OSVALDO, small cavity). Would likely benefit more from pure afterload reduction with neosynephrine and more IVF with pre and post LVOT gradients. If pt still not improving can consider pericardiocentesis at that time.  - Initial LVOT velocity on levo 0.35 at 11:39: VMax 148 V mean 94.5 VTI 18.5   - Repeat after 1.5L IVF on jamie 5 mcg at 12:45: VMax 145 v mean 93.8 VTI 27.9  - LA increasing to 10 --> 13  - Re-discussed case with Dr. Corrales, will activate cath lab for emergent pericardiocentesis     > Drain in place at 2:00 in the cath lab    > Fluid studies ordered     Signed by:  Cody Armstrong PGY5  Cardiology Fellow    [facility]

## 2023-09-24 NOTE — PROGRESS NOTE ADULT - ATTENDING COMMENTS
58F PMH of CKD, HTN/HLD, sev.pHTN, HFpEF, Asthma p/w progressive dyspnea iso volume overload subsequently requiring MICU admission for iHD for volume and metabolic disarray. Overnight (9/23) developed rising lactate in the setting of obstructive shock from pericardial effusion. Significant time today spent coordinating with cardiology fellow, interventional cardiology, repeat bedside pocus for pressor and fluid titration.       #Obstructive shock secondary to pericardial effusion w/ tamponade physiology  #pHTN, HfpEF  - cardiology consulted for pericardial drain   - temporized with fluids and switch to phenylephrine   - NPO  #ARF on CKD c/b volume overload   - iHD yesterday, renal following  - aiming for net positive due to pericardial effusion   # Hyponatremia / metabolic acidosis 2/2 renal failure   - continue iHD  - Goal correction of HypoNa 8 over 24 hrs.   #POCUS today  #DVT ppx - hsq  #GOC - full code 58F PMH of CKD, HTN/HLD, sev.pHTN, HFpEF, Asthma p/w progressive dyspnea iso volume overload subsequently requiring MICU admission for iHD for volume and metabolic disarray. Overnight (9/23) developed rising lactate in the setting of obstructive shock from pericardial effusion. Significant time today spent coordinating with cardiology fellow, interventional cardiology, repeat bedside pocus for pressor and fluid titration.       #Obstructive shock secondary to pericardial effusion w/ tamponade physiology  #pHTN, HfpEF  - cardiology consulted for pericardial drain   - temporized with fluids and switch to phenylephrine   - NPO  #ARF on CKD c/b volume overload   - iHD yesterday, renal following  - aiming for net positive due to pericardial effusion   # Hyponatremia / metabolic acidosis 2/2 renal failure   - continue iHD  - Goal correction of HypoNa 8 over 24 hrs.   #POCUS today  #DVT ppx - hsq  #GOC - full code      Bryan Chaudhari MD  Interventional Pulmonology & Critical Care Medicine

## 2023-09-24 NOTE — PROGRESS NOTE ADULT - PROBLEM SELECTOR PLAN 3
Pt. with metabolic acidosis in the setting of advanced kidney failure. SCO2 remains low but improved to 19 today following HD treatment. Monitor SCO2.    If you have any questions, please feel free to contact me  Cody Mcguire  Nephrology Fellow  [phone] / [employer] Teams(Preferred)  (After 5pm or on weekends please page the on-call fellow)

## 2023-09-24 NOTE — PROGRESS NOTE ADULT - PROBLEM SELECTOR PLAN 1
Pt. with oliguric ISAMAR on CKD in setting of HF and fluid overload. On review of labs on [place], Scr elevated at 3.31 on 8/30/23, increased to 3.92 on admission (9/20). Pt. initiated on IV Bumex infusion on 9/21. However, pt. remained anuric despite increase in IV Bumex infusion rate. Scr increased to 3.89 on 9/22. Pt. underwent Right IJ non-tunneled HD catheter placement and received 1st HD treatment on 9/22. Pt. clinically stable. Labs reviewed. No plan for HD today. Monitor labs and urine output. Avoid any potential nephrotoxins. Dose medications as per eGFR.

## 2023-09-24 NOTE — CHART NOTE - NSCHARTNOTEFT_GEN_A_CORE
: Abioal Lewis    INDICATION: hypotension    PROCEDURE:  [x] LIMITED ECHO  [x] LIMITED CHEST  [ ] LIMITED RETROPERITONEAL  [ ] LIMITED ABDOMINAL  [ ] LIMITED DVT  [ ] NEEDLE GUIDANCE VASCULAR  [ ] NEEDLE GUIDANCE THORACENTESIS  [ ] NEEDLE GUIDANCE PARACENTESIS  [ ] NEEDLE GUIDANCE PERICARDIOCENTESIS  [ ] OTHER    FINDINGS:  Bilateral B line predominance, L >R pleural effusion  LV with end systolic effacement, +OSVALDO, pericardial effusion appears increased from prior (1.6cm anteriorly, 2cm posteriorly with complexity)    INTERPRETATION:  Hypotension secondary to obstructive shock from cardiac tamponade, exacerbated by OSVALDO           Images uploaded on [employer] : Abiola Lewis    INDICATION: hypotension    PROCEDURE:  [x] LIMITED ECHO  [x] LIMITED CHEST  [ ] LIMITED RETROPERITONEAL  [ ] LIMITED ABDOMINAL  [ ] LIMITED DVT  [ ] NEEDLE GUIDANCE VASCULAR  [ ] NEEDLE GUIDANCE THORACENTESIS  [ ] NEEDLE GUIDANCE PARACENTESIS  [ ] NEEDLE GUIDANCE PERICARDIOCENTESIS  [ ] OTHER    FINDINGS:  Bilateral B line predominance, L >R pleural effusion  LV with end systolic effacement, +OSVALDO, pericardial effusion appears increased from prior (1.6cm anteriorly, 2cm posteriorly with complexity)    INTERPRETATION:  Hypotension secondary to obstructive shock from cardiac tamponade, exacerbated by OSVALDO           Images uploaded on [employer]      Attending Addendum:     I was present during the entire procedure and agree with the above findings and interpretation. TIme spent on the procedure was separate from the critical care time spent caring for the patient.     Bryan Chaudhari MD

## 2023-09-24 NOTE — PROGRESS NOTE ADULT - ATTENDING COMMENTS
ISAMAR on CKD  Hypervolemia  Pericardial effusion    Pt had HD yesterday however hypotensive since with a large sized effusion, concerning for tamponade. Would follow up with cardiology / CT surgery regarding drain/window.  No plans for HD.   Cont to monitor labs and Is/Os closely

## 2023-09-24 NOTE — PROGRESS NOTE ADULT - SUBJECTIVE AND OBJECTIVE BOX
INTERNAL MEDICINE PROGRESS NOTE  Mau Andrea MD, PGY-1  Please contact via TEAMS    Patient is a 58y old  Female who presents with a chief complaint of Heart failure     (23 Sep 2023 13:02)      SUBJECTIVE / OVERNIGHT EVENTS::  No acute overnight events. Pt seen and examined. Denies fevers, chills, CP, SOB, Abdominal pain, N/V, Constipation, Diarrhea        =================Meds=================  MEDICATIONS  (STANDING):  atorvastatin 40 milliGRAM(s) Oral at bedtime  chlorhexidine 2% Cloths 1 Application(s) Topical <User Schedule>  dextrose 5%. 1000 milliLiter(s) (100 mL/Hr) IV Continuous <Continuous>  dextrose 5%. 1000 milliLiter(s) (50 mL/Hr) IV Continuous <Continuous>  dextrose 50% Injectable 25 Gram(s) IV Push once  dextrose 50% Injectable 25 Gram(s) IV Push once  dextrose 50% Injectable 12.5 Gram(s) IV Push once  glucagon  Injectable 1 milliGRAM(s) IntraMuscular once  heparin   Injectable 5000 Unit(s) SubCutaneous every 8 hours  hydrALAZINE 100 milliGRAM(s) Oral three times a day  insulin lispro (ADMELOG) corrective regimen sliding scale   SubCutaneous at bedtime  insulin lispro (ADMELOG) corrective regimen sliding scale   SubCutaneous three times a day before meals  isosorbide   dinitrate Tablet (ISORDIL) 10 milliGRAM(s) Oral three times a day  levothyroxine 125 MICROGram(s) Oral daily  NIFEdipine XL 60 milliGRAM(s) Oral daily  pantoprazole    Tablet 40 milliGRAM(s) Oral before breakfast  sodium bicarbonate 650 milliGRAM(s) Oral three times a day    MEDICATIONS  (PRN):  dextrose Oral Gel 15 Gram(s) Oral once PRN Blood Glucose LESS THAN 70 milliGRAM(s)/deciliter  melatonin 3 milliGRAM(s) Oral at bedtime PRN Insomnia      I&O's Summary    23 Sep 2023 07:01  -  24 Sep 2023 07:00  --------------------------------------------------------  IN: 728 mL / OUT: 1500 mL / NET: -772 mL          =================Vitals=================  Vital Signs Last 24 Hrs  T(C): 35.4 (24 Sep 2023 04:00), Max: 35.8 (23 Sep 2023 23:00)  T(F): 95.8 (24 Sep 2023 04:00), Max: 96.5 (23 Sep 2023 23:00)  HR: 82 (24 Sep 2023 06:00) (67 - 91)  BP: 113/61 (24 Sep 2023 06:00) (98/65 - 149/69)  BP(mean): 79 (24 Sep 2023 06:00) (68 - 111)  RR: 21 (24 Sep 2023 06:00) (12 - 26)  SpO2: 93% (24 Sep 2023 06:00) (92% - 100%)    Parameters below as of 24 Sep 2023 06:00  Patient On (Oxygen Delivery Method): nasal cannula  O2 Flow (L/min): 4      =================PHYSICAL EXAM=================  GENERAL: Laying comfortably, NAD  EYES: EOMI, PERRL, no scleral icterus  NECK: No JVD  LUNG: Clear to auscultation bilaterally; No wheeze, crackles or rhonci  HEART: Regular rate and rhythm; No murmurs, rubs, or gallops  ABDOMEN: Soft, Nontender, Nondistended  EXTREMITIES:  No LE edema, 2+ Peripheral Pulses, No clubbing, cyanosis, or edema  PSYCH: AAOx3  NEUROLOGY: non-focal, strength 5/5 in all extremities, sensation intact  SKIN: No rashes or lesions    ===================LABS=======================                        7.7    6.67  )-----------( 228      ( 24 Sep 2023 02:28 )             23.4     Auto Eosinophil # 0.03  / Auto Eosinophil % 0.4   / Auto Neutrophil # 5.80  / Auto Neutrophil % 87.2  / BANDS % x                            7.2    7.67  )-----------( 210      ( 23 Sep 2023 16:10 )             21.2     Auto Eosinophil # 0.00  / Auto Eosinophil % 0.0   / Auto Neutrophil # 7.67  / Auto Neutrophil % 100.0 / BANDS % x                            7.3    8.22  )-----------( 188      ( 23 Sep 2023 12:05 )             22.1     Auto Eosinophil # 0.07  / Auto Eosinophil % 0.9   / Auto Neutrophil # 7.20  / Auto Neutrophil % 87.5  / BANDS % x        09-24    136  |  94<L>  |  52<H>  ----------------------------<  144<H>  4.9   |  19<L>  |  2.58<H>  09-23    128<L>  |  92<L>  |  86<H>  ----------------------------<  189<H>  4.5   |  18<L>  |  3.37<H>  09-23    130<L>  |  91<L>  |  83<H>  ----------------------------<  97  4.3   |  19<L>  |  3.31<H>    Ca    8.8      24 Sep 2023 02:28  Mg     2.40     09-24  Phos  5.4     09-24  TPro  6.9  /  Alb  3.7  /  TBili  1.0  /  DBili  x   /  AST  63<H>  /  ALT  32  /  AlkPhos  900<H>  09-24  TPro  6.7  /  Alb  3.7  /  TBili  0.4  /  DBili  x   /  AST  26  /  ALT  19  /  AlkPhos  814<H>  09-23  TPro  6.8  /  Alb  3.7  /  TBili  0.4  /  DBili  x   /  AST  26  /  ALT  19  /  AlkPhos  843<H>  09-23    PT/INR - ( 24 Sep 2023 02:28 )   PT: 17.3 sec;   INR: 1.56 ratio         PTT - ( 24 Sep 2023 02:28 )  PTT:73.9 sec      Urinalysis Basic - ( 24 Sep 2023 02:28 )    Color: x / Appearance: x / SG: x / pH: x  Gluc: 144 mg/dL / Ketone: x  / Bili: x / Urobili: x   Blood: x / Protein: x / Nitrite: x   Leuk Esterase: x / RBC: x / WBC x   Sq Epi: x / Non Sq Epi: x / Bacteria: x      Lactate, Blood: 7.4 mmol/L (09-24 @ 04:10)      ===============Radiology & Additional Tests==================         INTERNAL MEDICINE PROGRESS NOTE  Mau Andrea MD, PGY-1  Please contact via TEAMS    Patient is a 58y old  Female who presents with a chief complaint of Heart failure     (23 Sep 2023 13:02)      SUBJECTIVE / OVERNIGHT EVENTS::  No acute overnight events. Pt seen and examined. Hypotensive this am to 60/40 i/s/o pericardial effusion/ temponade. started on levophed, s/p 1L IVF.      =================Meds=================  MEDICATIONS  (STANDING):  atorvastatin 40 milliGRAM(s) Oral at bedtime  chlorhexidine 2% Cloths 1 Application(s) Topical <User Schedule>  dextrose 5%. 1000 milliLiter(s) (100 mL/Hr) IV Continuous <Continuous>  dextrose 5%. 1000 milliLiter(s) (50 mL/Hr) IV Continuous <Continuous>  dextrose 50% Injectable 25 Gram(s) IV Push once  dextrose 50% Injectable 25 Gram(s) IV Push once  dextrose 50% Injectable 12.5 Gram(s) IV Push once  glucagon  Injectable 1 milliGRAM(s) IntraMuscular once  heparin   Injectable 5000 Unit(s) SubCutaneous every 8 hours  hydrALAZINE 100 milliGRAM(s) Oral three times a day  insulin lispro (ADMELOG) corrective regimen sliding scale   SubCutaneous at bedtime  insulin lispro (ADMELOG) corrective regimen sliding scale   SubCutaneous three times a day before meals  isosorbide   dinitrate Tablet (ISORDIL) 10 milliGRAM(s) Oral three times a day  levothyroxine 125 MICROGram(s) Oral daily  NIFEdipine XL 60 milliGRAM(s) Oral daily  pantoprazole    Tablet 40 milliGRAM(s) Oral before breakfast  sodium bicarbonate 650 milliGRAM(s) Oral three times a day    MEDICATIONS  (PRN):  dextrose Oral Gel 15 Gram(s) Oral once PRN Blood Glucose LESS THAN 70 milliGRAM(s)/deciliter  melatonin 3 milliGRAM(s) Oral at bedtime PRN Insomnia      I&O's Summary    23 Sep 2023 07:01  -  24 Sep 2023 07:00  --------------------------------------------------------  IN: 728 mL / OUT: 1500 mL / NET: -772 mL          =================Vitals=================  Vital Signs Last 24 Hrs  T(C): 35.4 (24 Sep 2023 04:00), Max: 35.8 (23 Sep 2023 23:00)  T(F): 95.8 (24 Sep 2023 04:00), Max: 96.5 (23 Sep 2023 23:00)  HR: 82 (24 Sep 2023 06:00) (67 - 91)  BP: 113/61 (24 Sep 2023 06:00) (98/65 - 149/69)  BP(mean): 79 (24 Sep 2023 06:00) (68 - 111)  RR: 21 (24 Sep 2023 06:00) (12 - 26)  SpO2: 93% (24 Sep 2023 06:00) (92% - 100%)    Parameters below as of 24 Sep 2023 06:00  Patient On (Oxygen Delivery Method): nasal cannula  O2 Flow (L/min): 4      =================PHYSICAL EXAM=================  GENERAL: Laying comfortably, NAD  EYES: EOMI, PERRL, no scleral icterus  NECK: No JVD  LUNG: Clear to auscultation bilaterally; No wheeze, crackles or rhonci  HEART: Regular rate and rhythm; No murmurs, rubs, or gallops  ABDOMEN: Soft, Nontender, Nondistended  EXTREMITIES:  No LE edema, 2+ Peripheral Pulses, No clubbing, cyanosis, or edema  PSYCH: AAOx3  NEUROLOGY: non-focal, strength 5/5 in all extremities, sensation intact  SKIN: No rashes or lesions    ===================LABS=======================                        7.7    6.67  )-----------( 228      ( 24 Sep 2023 02:28 )             23.4     Auto Eosinophil # 0.03  / Auto Eosinophil % 0.4   / Auto Neutrophil # 5.80  / Auto Neutrophil % 87.2  / BANDS % x                            7.2    7.67  )-----------( 210      ( 23 Sep 2023 16:10 )             21.2     Auto Eosinophil # 0.00  / Auto Eosinophil % 0.0   / Auto Neutrophil # 7.67  / Auto Neutrophil % 100.0 / BANDS % x                            7.3    8.22  )-----------( 188      ( 23 Sep 2023 12:05 )             22.1     Auto Eosinophil # 0.07  / Auto Eosinophil % 0.9   / Auto Neutrophil # 7.20  / Auto Neutrophil % 87.5  / BANDS % x        09-24    136  |  94<L>  |  52<H>  ----------------------------<  144<H>  4.9   |  19<L>  |  2.58<H>  09-23    128<L>  |  92<L>  |  86<H>  ----------------------------<  189<H>  4.5   |  18<L>  |  3.37<H>  09-23    130<L>  |  91<L>  |  83<H>  ----------------------------<  97  4.3   |  19<L>  |  3.31<H>    Ca    8.8      24 Sep 2023 02:28  Mg     2.40     09-24  Phos  5.4     09-24  TPro  6.9  /  Alb  3.7  /  TBili  1.0  /  DBili  x   /  AST  63<H>  /  ALT  32  /  AlkPhos  900<H>  09-24  TPro  6.7  /  Alb  3.7  /  TBili  0.4  /  DBili  x   /  AST  26  /  ALT  19  /  AlkPhos  814<H>  09-23  TPro  6.8  /  Alb  3.7  /  TBili  0.4  /  DBili  x   /  AST  26  /  ALT  19  /  AlkPhos  843<H>  09-23    PT/INR - ( 24 Sep 2023 02:28 )   PT: 17.3 sec;   INR: 1.56 ratio         PTT - ( 24 Sep 2023 02:28 )  PTT:73.9 sec      Urinalysis Basic - ( 24 Sep 2023 02:28 )    Color: x / Appearance: x / SG: x / pH: x  Gluc: 144 mg/dL / Ketone: x  / Bili: x / Urobili: x   Blood: x / Protein: x / Nitrite: x   Leuk Esterase: x / RBC: x / WBC x   Sq Epi: x / Non Sq Epi: x / Bacteria: x      Lactate, Blood: 7.4 mmol/L (09-24 @ 04:10)      ===============Radiology & Additional Tests==================

## 2023-09-25 ENCOUNTER — APPOINTMENT (OUTPATIENT)
Dept: HEART FAILURE | Facility: CLINIC | Age: 58
End: 2023-09-25

## 2023-09-25 LAB
ALBUMIN SERPL ELPH-MCNC: 3.1 G/DL — LOW (ref 3.3–5)
ALBUMIN SERPL ELPH-MCNC: 3.2 G/DL — LOW (ref 3.3–5)
ALP SERPL-CCNC: 855 U/L — HIGH (ref 40–120)
ALP SERPL-CCNC: 942 U/L — HIGH (ref 40–120)
ALT FLD-CCNC: 589 U/L — HIGH (ref 4–33)
ALT FLD-CCNC: 643 U/L — HIGH (ref 4–33)
ANION GAP SERPL CALC-SCNC: 13 MMOL/L — SIGNIFICANT CHANGE UP (ref 7–14)
ANION GAP SERPL CALC-SCNC: 30 MMOL/L — HIGH (ref 7–14)
APTT BLD: 102 SEC — HIGH (ref 24.5–35.6)
APTT BLD: >200 SEC — SIGNIFICANT CHANGE UP (ref 24.5–35.6)
AST SERPL-CCNC: 1074 U/L — HIGH (ref 4–32)
AST SERPL-CCNC: 1165 U/L — HIGH (ref 4–32)
BASOPHILS # BLD AUTO: 0.01 K/UL — SIGNIFICANT CHANGE UP (ref 0–0.2)
BASOPHILS # BLD AUTO: 0.03 K/UL — SIGNIFICANT CHANGE UP (ref 0–0.2)
BASOPHILS NFR BLD AUTO: 0.1 % — SIGNIFICANT CHANGE UP (ref 0–2)
BASOPHILS NFR BLD AUTO: 0.1 % — SIGNIFICANT CHANGE UP (ref 0–2)
BILIRUB SERPL-MCNC: 1.3 MG/DL — HIGH (ref 0.2–1.2)
BILIRUB SERPL-MCNC: 1.4 MG/DL — HIGH (ref 0.2–1.2)
BLOOD GAS ARTERIAL - LYTES,HGB,ICA,LACT RESULT: SIGNIFICANT CHANGE UP
BLOOD GAS ARTERIAL COMPREHENSIVE RESULT: SIGNIFICANT CHANGE UP
BUN SERPL-MCNC: 26 MG/DL — HIGH (ref 7–23)
BUN SERPL-MCNC: 58 MG/DL — HIGH (ref 7–23)
CA-I BLD-SCNC: 0.97 MMOL/L — LOW (ref 1.15–1.29)
CALCIUM SERPL-MCNC: 7.7 MG/DL — LOW (ref 8.4–10.5)
CALCIUM SERPL-MCNC: 8.2 MG/DL — LOW (ref 8.4–10.5)
CHLORIDE SERPL-SCNC: 91 MMOL/L — LOW (ref 98–107)
CHLORIDE SERPL-SCNC: 99 MMOL/L — SIGNIFICANT CHANGE UP (ref 98–107)
CO2 SERPL-SCNC: 14 MMOL/L — LOW (ref 22–31)
CO2 SERPL-SCNC: 28 MMOL/L — SIGNIFICANT CHANGE UP (ref 22–31)
CREAT SERPL-MCNC: 1.57 MG/DL — HIGH (ref 0.5–1.3)
CREAT SERPL-MCNC: 2.91 MG/DL — HIGH (ref 0.5–1.3)
EGFR: 18 ML/MIN/1.73M2 — LOW
EGFR: 38 ML/MIN/1.73M2 — LOW
EOSINOPHIL # BLD AUTO: 0 K/UL — SIGNIFICANT CHANGE UP (ref 0–0.5)
EOSINOPHIL # BLD AUTO: 0.02 K/UL — SIGNIFICANT CHANGE UP (ref 0–0.5)
EOSINOPHIL NFR BLD AUTO: 0 % — SIGNIFICANT CHANGE UP (ref 0–6)
EOSINOPHIL NFR BLD AUTO: 0.1 % — SIGNIFICANT CHANGE UP (ref 0–6)
GLUCOSE BLDC GLUCOMTR-MCNC: 113 MG/DL — HIGH (ref 70–99)
GLUCOSE BLDC GLUCOMTR-MCNC: 144 MG/DL — HIGH (ref 70–99)
GLUCOSE BLDC GLUCOMTR-MCNC: 323 MG/DL — HIGH (ref 70–99)
GLUCOSE BLDC GLUCOMTR-MCNC: 419 MG/DL — HIGH (ref 70–99)
GLUCOSE SERPL-MCNC: 164 MG/DL — HIGH (ref 70–99)
GLUCOSE SERPL-MCNC: 385 MG/DL — HIGH (ref 70–99)
GRAM STN FLD: SIGNIFICANT CHANGE UP
HCT VFR BLD CALC: 19 % — CRITICAL LOW (ref 34.5–45)
HCT VFR BLD CALC: 21.8 % — LOW (ref 34.5–45)
HCT VFR BLD CALC: 23.7 % — LOW (ref 34.5–45)
HGB BLD-MCNC: 6.3 G/DL — CRITICAL LOW (ref 11.5–15.5)
HGB BLD-MCNC: 6.9 G/DL — CRITICAL LOW (ref 11.5–15.5)
HGB BLD-MCNC: 8.2 G/DL — LOW (ref 11.5–15.5)
IANC: 15.51 K/UL — HIGH (ref 1.8–7.4)
IANC: 24.25 K/UL — HIGH (ref 1.8–7.4)
IMM GRANULOCYTES NFR BLD AUTO: 1 % — HIGH (ref 0–0.9)
IMM GRANULOCYTES NFR BLD AUTO: 1.4 % — HIGH (ref 0–0.9)
INR BLD: 2.61 RATIO — HIGH (ref 0.85–1.18)
INR BLD: 2.69 RATIO — HIGH (ref 0.85–1.18)
LACTATE BLDA-MCNC: 11.6 MMOL/L — CRITICAL HIGH (ref 0.5–2)
LACTATE SERPL-SCNC: 1.4 MMOL/L — SIGNIFICANT CHANGE UP (ref 0.5–2)
LYMPHOCYTES # BLD AUTO: 0.53 K/UL — LOW (ref 1–3.3)
LYMPHOCYTES # BLD AUTO: 0.91 K/UL — LOW (ref 1–3.3)
LYMPHOCYTES # BLD AUTO: 3.1 % — LOW (ref 13–44)
LYMPHOCYTES # BLD AUTO: 3.4 % — LOW (ref 13–44)
MAGNESIUM SERPL-MCNC: 1.9 MG/DL — SIGNIFICANT CHANGE UP (ref 1.6–2.6)
MAGNESIUM SERPL-MCNC: 2.2 MG/DL — SIGNIFICANT CHANGE UP (ref 1.6–2.6)
MCHC RBC-ENTMCNC: 25.4 PG — LOW (ref 27–34)
MCHC RBC-ENTMCNC: 25.5 PG — LOW (ref 27–34)
MCHC RBC-ENTMCNC: 26.4 PG — LOW (ref 27–34)
MCHC RBC-ENTMCNC: 31.7 GM/DL — LOW (ref 32–36)
MCHC RBC-ENTMCNC: 33.2 GM/DL — SIGNIFICANT CHANGE UP (ref 32–36)
MCHC RBC-ENTMCNC: 34.6 GM/DL — SIGNIFICANT CHANGE UP (ref 32–36)
MCV RBC AUTO: 76.2 FL — LOW (ref 80–100)
MCV RBC AUTO: 76.9 FL — LOW (ref 80–100)
MCV RBC AUTO: 80.1 FL — SIGNIFICANT CHANGE UP (ref 80–100)
MONOCYTES # BLD AUTO: 0.64 K/UL — SIGNIFICANT CHANGE UP (ref 0–0.9)
MONOCYTES # BLD AUTO: 1.28 K/UL — HIGH (ref 0–0.9)
MONOCYTES NFR BLD AUTO: 3.8 % — SIGNIFICANT CHANGE UP (ref 2–14)
MONOCYTES NFR BLD AUTO: 4.8 % — SIGNIFICANT CHANGE UP (ref 2–14)
MRSA PCR RESULT.: SIGNIFICANT CHANGE UP
NEUTROPHILS # BLD AUTO: 15.51 K/UL — HIGH (ref 1.8–7.4)
NEUTROPHILS # BLD AUTO: 24.25 K/UL — HIGH (ref 1.8–7.4)
NEUTROPHILS NFR BLD AUTO: 90.3 % — HIGH (ref 43–77)
NEUTROPHILS NFR BLD AUTO: 91.9 % — HIGH (ref 43–77)
NIGHT BLUE STAIN TISS: SIGNIFICANT CHANGE UP
NRBC # BLD: 1 /100 WBCS — HIGH (ref 0–0)
NRBC # BLD: 1 /100 WBCS — HIGH (ref 0–0)
NRBC # BLD: 2 /100 WBCS — HIGH (ref 0–0)
NRBC # FLD: 0.21 K/UL — HIGH (ref 0–0)
NRBC # FLD: 0.33 K/UL — HIGH (ref 0–0)
NRBC # FLD: 0.35 K/UL — HIGH (ref 0–0)
PHOSPHATE SERPL-MCNC: 2.7 MG/DL — SIGNIFICANT CHANGE UP (ref 2.5–4.5)
PHOSPHATE SERPL-MCNC: 5.8 MG/DL — HIGH (ref 2.5–4.5)
PLATELET # BLD AUTO: 129 K/UL — LOW (ref 150–400)
PLATELET # BLD AUTO: 141 K/UL — LOW (ref 150–400)
PLATELET # BLD AUTO: 219 K/UL — SIGNIFICANT CHANGE UP (ref 150–400)
POTASSIUM SERPL-MCNC: 3.2 MMOL/L — LOW (ref 3.5–5.3)
POTASSIUM SERPL-MCNC: 4 MMOL/L — SIGNIFICANT CHANGE UP (ref 3.5–5.3)
POTASSIUM SERPL-SCNC: 3.2 MMOL/L — LOW (ref 3.5–5.3)
POTASSIUM SERPL-SCNC: 4 MMOL/L — SIGNIFICANT CHANGE UP (ref 3.5–5.3)
PROT SERPL-MCNC: 5.6 G/DL — LOW (ref 6–8.3)
PROT SERPL-MCNC: 6.6 G/DL — SIGNIFICANT CHANGE UP (ref 6–8.3)
PROTHROM AB SERPL-ACNC: 28.7 SEC — HIGH (ref 9.5–13)
PROTHROM AB SERPL-ACNC: 29.3 SEC — HIGH (ref 9.5–13)
RBC # BLD: 2.47 M/UL — LOW (ref 3.8–5.2)
RBC # BLD: 2.72 M/UL — LOW (ref 3.8–5.2)
RBC # BLD: 3.11 M/UL — LOW (ref 3.8–5.2)
RBC # FLD: 15 % — HIGH (ref 10.3–14.5)
RBC # FLD: 15.8 % — HIGH (ref 10.3–14.5)
RBC # FLD: 15.8 % — HIGH (ref 10.3–14.5)
S AUREUS DNA NOSE QL NAA+PROBE: SIGNIFICANT CHANGE UP
SODIUM SERPL-SCNC: 135 MMOL/L — SIGNIFICANT CHANGE UP (ref 135–145)
SODIUM SERPL-SCNC: 140 MMOL/L — SIGNIFICANT CHANGE UP (ref 135–145)
SPECIMEN SOURCE: SIGNIFICANT CHANGE UP
SPECIMEN SOURCE: SIGNIFICANT CHANGE UP
WBC # BLD: 16.88 K/UL — HIGH (ref 3.8–10.5)
WBC # BLD: 18.49 K/UL — HIGH (ref 3.8–10.5)
WBC # BLD: 26.84 K/UL — HIGH (ref 3.8–10.5)
WBC # FLD AUTO: 16.88 K/UL — HIGH (ref 3.8–10.5)
WBC # FLD AUTO: 18.49 K/UL — HIGH (ref 3.8–10.5)
WBC # FLD AUTO: 26.84 K/UL — HIGH (ref 3.8–10.5)

## 2023-09-25 PROCEDURE — 93321 DOPPLER ECHO F-UP/LMTD STD: CPT | Mod: 26

## 2023-09-25 PROCEDURE — 99233 SBSQ HOSP IP/OBS HIGH 50: CPT | Mod: GC

## 2023-09-25 PROCEDURE — 99291 CRITICAL CARE FIRST HOUR: CPT

## 2023-09-25 PROCEDURE — 99291 CRITICAL CARE FIRST HOUR: CPT | Mod: GC

## 2023-09-25 PROCEDURE — 93308 TTE F-UP OR LMTD: CPT | Mod: 26,GC

## 2023-09-25 RX ORDER — NOREPINEPHRINE BITARTRATE/D5W 8 MG/250ML
0.05 PLASTIC BAG, INJECTION (ML) INTRAVENOUS
Qty: 16 | Refills: 0 | Status: DISCONTINUED | OUTPATIENT
Start: 2023-09-25 | End: 2023-09-25

## 2023-09-25 RX ORDER — PHYTONADIONE (VIT K1) 5 MG
5 TABLET ORAL ONCE
Refills: 0 | Status: COMPLETED | OUTPATIENT
Start: 2023-09-25 | End: 2023-09-25

## 2023-09-25 RX ORDER — INSULIN LISPRO 100/ML
VIAL (ML) SUBCUTANEOUS
Refills: 0 | Status: DISCONTINUED | OUTPATIENT
Start: 2023-09-25 | End: 2023-10-04

## 2023-09-25 RX ORDER — INSULIN GLARGINE 100 [IU]/ML
6 INJECTION, SOLUTION SUBCUTANEOUS ONCE
Refills: 0 | Status: COMPLETED | OUTPATIENT
Start: 2023-09-25 | End: 2023-09-25

## 2023-09-25 RX ORDER — INSULIN LISPRO 100/ML
2 VIAL (ML) SUBCUTANEOUS
Refills: 0 | Status: DISCONTINUED | OUTPATIENT
Start: 2023-09-25 | End: 2023-09-26

## 2023-09-25 RX ORDER — SENNA PLUS 8.6 MG/1
2 TABLET ORAL AT BEDTIME
Refills: 0 | Status: DISCONTINUED | OUTPATIENT
Start: 2023-09-25 | End: 2023-10-06

## 2023-09-25 RX ORDER — VASOPRESSIN 20 [USP'U]/ML
0.04 INJECTION INTRAVENOUS
Qty: 40 | Refills: 0 | Status: DISCONTINUED | OUTPATIENT
Start: 2023-09-25 | End: 2023-09-25

## 2023-09-25 RX ORDER — CALCIUM GLUCONATE 100 MG/ML
2 VIAL (ML) INTRAVENOUS ONCE
Refills: 0 | Status: COMPLETED | OUTPATIENT
Start: 2023-09-25 | End: 2023-09-25

## 2023-09-25 RX ORDER — POLYETHYLENE GLYCOL 3350 17 G/17G
17 POWDER, FOR SOLUTION ORAL DAILY
Refills: 0 | Status: DISCONTINUED | OUTPATIENT
Start: 2023-09-25 | End: 2023-10-06

## 2023-09-25 RX ADMIN — Medication 50 MILLIGRAM(S): at 23:17

## 2023-09-25 RX ADMIN — CHLORHEXIDINE GLUCONATE 1 APPLICATION(S): 213 SOLUTION TOPICAL at 05:59

## 2023-09-25 RX ADMIN — Medication 50 MILLIGRAM(S): at 05:38

## 2023-09-25 RX ADMIN — PIPERACILLIN AND TAZOBACTAM 25 GRAM(S): 4; .5 INJECTION, POWDER, LYOPHILIZED, FOR SOLUTION INTRAVENOUS at 05:44

## 2023-09-25 RX ADMIN — Medication 125 MICROGRAM(S): at 05:34

## 2023-09-25 RX ADMIN — HEPARIN SODIUM 5000 UNIT(S): 5000 INJECTION INTRAVENOUS; SUBCUTANEOUS at 05:43

## 2023-09-25 RX ADMIN — POLYETHYLENE GLYCOL 3350 17 GRAM(S): 17 POWDER, FOR SOLUTION ORAL at 12:10

## 2023-09-25 RX ADMIN — Medication 2 UNIT(S): at 17:40

## 2023-09-25 RX ADMIN — Medication 50 MILLIGRAM(S): at 12:10

## 2023-09-25 RX ADMIN — Medication 650 MILLIGRAM(S): at 07:13

## 2023-09-25 RX ADMIN — Medication 2.85 MICROGRAM(S)/KG/MIN: at 07:47

## 2023-09-25 RX ADMIN — Medication 0: at 21:49

## 2023-09-25 RX ADMIN — PIPERACILLIN AND TAZOBACTAM 25 GRAM(S): 4; .5 INJECTION, POWDER, LYOPHILIZED, FOR SOLUTION INTRAVENOUS at 17:07

## 2023-09-25 RX ADMIN — PANTOPRAZOLE SODIUM 40 MILLIGRAM(S): 20 TABLET, DELAYED RELEASE ORAL at 07:12

## 2023-09-25 RX ADMIN — Medication 6: at 08:12

## 2023-09-25 RX ADMIN — Medication 50 MILLIGRAM(S): at 17:07

## 2023-09-25 RX ADMIN — Medication 650 MILLIGRAM(S): at 21:50

## 2023-09-25 RX ADMIN — SENNA PLUS 2 TABLET(S): 8.6 TABLET ORAL at 21:50

## 2023-09-25 RX ADMIN — Medication 650 MILLIGRAM(S): at 13:43

## 2023-09-25 RX ADMIN — Medication 101 MILLIGRAM(S): at 20:16

## 2023-09-25 RX ADMIN — HEPARIN SODIUM 5000 UNIT(S): 5000 INJECTION INTRAVENOUS; SUBCUTANEOUS at 13:43

## 2023-09-25 RX ADMIN — Medication 200 GRAM(S): at 01:51

## 2023-09-25 RX ADMIN — INSULIN GLARGINE 6 UNIT(S): 100 INJECTION, SOLUTION SUBCUTANEOUS at 09:43

## 2023-09-25 NOTE — PROGRESS NOTE ADULT - SUBJECTIVE AND OBJECTIVE BOX
Interval History:  Patient resting comfortably in bed   Denies CP/SOB/palpitations/dizziness  No acute events overnight    Medications:  atorvastatin 40 milliGRAM(s) Oral at bedtime  chlorhexidine 2% Cloths 1 Application(s) Topical <User Schedule>  dextrose 5%. 1000 milliLiter(s) IV Continuous <Continuous>  dextrose 5%. 1000 milliLiter(s) IV Continuous <Continuous>  dextrose 50% Injectable 12.5 Gram(s) IV Push once  dextrose 50% Injectable 25 Gram(s) IV Push once  dextrose 50% Injectable 25 Gram(s) IV Push once  dextrose Oral Gel 15 Gram(s) Oral once PRN  glucagon  Injectable 1 milliGRAM(s) IntraMuscular once  heparin   Injectable 5000 Unit(s) SubCutaneous every 8 hours  hydrocortisone sodium succinate Injectable 50 milliGRAM(s) IV Push every 6 hours  insulin lispro (ADMELOG) corrective regimen sliding scale   SubCutaneous three times a day before meals  insulin lispro (ADMELOG) corrective regimen sliding scale   SubCutaneous at bedtime  levothyroxine 125 MICROGram(s) Oral daily  melatonin 3 milliGRAM(s) Oral at bedtime PRN  norepinephrine Infusion 0.05 MICROgram(s)/kG/Min IV Continuous <Continuous>  pantoprazole    Tablet 40 milliGRAM(s) Oral before breakfast  piperacillin/tazobactam IVPB.. 3.375 Gram(s) IV Intermittent every 12 hours  polyethylene glycol 3350 17 Gram(s) Oral daily  senna 2 Tablet(s) Oral at bedtime  sodium bicarbonate 650 milliGRAM(s) Oral three times a day  vasopressin Infusion 0.04 Unit(s)/Min IV Continuous <Continuous>      Vitals:  T(C): 35 (09-25-23 @ 08:00), Max: 36.1 (09-24-23 @ 16:00)  HR: 65 (09-25-23 @ 09:00) (52 - 135)  BP: --  BP(mean): --  RR: 21 (09-25-23 @ 09:00) (12 - 29)  SpO2: 95% (09-25-23 @ 09:00) (89% - 100%)    Daily     Daily         I&O's Summary    24 Sep 2023 07:01  -  25 Sep 2023 07:00  --------------------------------------------------------  IN: 2940.2 mL / OUT: 505 mL / NET: 2435.2 mL    25 Sep 2023 07:01  -  25 Sep 2023 10:00  --------------------------------------------------------  IN: 5.6 mL / OUT: 20 mL / NET: -14.4 mL        Physical Exam:  Appearance: No Acute Distress  Neck: JVP  Cardiovascular: Normal S1 S2  Respiratory: Clear to auscultation bilaterally  Gastrointestinal: Soft, Non-tender	  Skin: No cyanosis	  Neurologic: Non-focal  Extremities:  LE edema    Labs:                        6.3    18.49 )-----------( 141      ( 25 Sep 2023 06:30 )             19.0     09-25    135  |  91<L>  |  58<H>  ----------------------------<  385<H>  4.0   |  14<L>  |  2.91<H>    Ca    7.7<L>      25 Sep 2023 00:25  Phos  5.8     09-25  Mg     2.20     09-25    TPro  5.6<L>  /  Alb  3.1<L>  /  TBili  1.3<H>  /  DBili  x   /  AST  1074<H>  /  ALT  589<H>  /  AlkPhos  942<H>  09-25    PT/INR - ( 25 Sep 2023 00:25 )   PT: 29.3 sec;   INR: 2.69 ratio         PTT - ( 25 Sep 2023 00:25 )  PTT:102.0 sec  CARDIAC MARKERS ( 24 Sep 2023 02:28 )  x     / x     / 52 U/L / x     / 8.5 ng/mL        TELEMETRY:    Echocardiogram:  TTE W or WO Ultrasound Enhancing Agent (09.23.23 @ 07:51)      CONCLUSIONS:      1. Left ventricular systolic function is normal with an ejection fraction of 55 % by Macedo's method of disks.   2. Based on visual assessment, the right ventricle appears mildly enlarged. reduced systolic function.   3. Borderline left ventricular hypertrophy.   4. Large pericardial effusion.   5. There is a large circumferential pericardial effusion measuring 1.8 cm posterior to the left ventricle in the parasternal view and appearsto measure ~1.5 cm anterior to the right ventricle in the subcostal view. The right ventricle does not appear to completely expand during diastole however there is no overt diastolic collapse. There is 23% respirophasic variation across the mitral valve. These findings are consistent with increased intrapericardial pressure however there is no evidence of overt tamponade physiology.    ________________________________________________________________________________________  FINDINGS:     Left Ventricle:  Left ventricular wall thickness is mildly increased. Left ventricular systolic function is normal with a calculated ejection fraction of 55 % by the Macedo's biplane method of disks. Borderline left ventricular hypertrophy.     Right Ventricle:  Based on visual assessment, the right ventricle appears mildly enlarged. Reduced systolic function. Tricuspid annular plane systolic excursion (TAPSE) is 1.3 cm (normal >=1.7 cm).     Aortic Valve:  The aortic valve appears trileaflet. There is calcification of the aortic valve leaflets. There is no aortic stenosis.     Tricuspid Valve:  There is trace tricuspid regurgitation.     Pulmonic Valve:  There is trace pulmonic regurgitation.     Pericardium:  There is a large circumferential pericardial effusion measuring 1.8 cm posterior to the left ventricle in the parasternal view and appears to measure ~1.5 cm anterior to the right ventricle in the subcostal view. The right ventricle does not appear to completely expand during diastole however there is no overt diastolic collapse. There is 23% respirophasic variation across the mitral valve. These findings are consistent with increased intrapericardial pressure however there is no evidence of overt tamponade physiology.    Pleura:  Large left pleural effusion noted.  ____________________________________________________________________  Quantitative Data:  Left Ventricle Measurements: (Indexed to BSA)     IVSd (2D):   1.3 cm  LVPWd (2D):  1.2 cm  LVIDd (2D):  3.6 cm  LVIDs (2D):  2.5cm  LV Mass:     153 g  95.1 g/m²  LV Vol d, MOD A2C: 36.9 ml 22.96 ml/m²  LV Vol d, MOD A4C: 44.2 ml 27.50 ml/m²  LV Vol d, MOD BP:  40.3 ml 25.07 ml/m²  LV Vol s, MOD A2C: 16.6 ml 10.33 ml/m²  LV Vol s, MOD A4C: 19.9 ml 12.38 ml/m²  LV Vol s, MOD BP:  18.1 ml 11.27 ml/m²  LVOT SV MOD BP:    22.2 ml  LV EF% MOD BP:     55 %     MV E Vmax:    0.81 m/s  MV A Vmax:    0.78 m/s  MV E/A:       1.04  e' lateral:   7.29 cm/s  e' medial:    8.27 cm/s  E/e' lateral: 11.07  E/e' medial:  9.76  E/e' Average: 10.37  MV DT:        230 msec    Aorta Measurements: (normal range) (Indexed to BSA)     Sinuses of Valsalva: 2.70 cm (2.7 - 3.3 cm)       Left Atrium Measurements: (Indexed to BSA)  LA Diam 2D: 3.30 cm    Right Ventricle Measurements:     TAPSE:        1.3 cm  TV Shanita. S':      9.36 cm/s  RV Base (RVID1): 3.6 cm       LVOT / RVOT/ Qp/Qs Data: (Indexed to BSA)  LVOT Diameter: 1.40 cm  LVOT Vmax:     1.61 m/s  LVOT VTI:      25.90 cm  LVOT SV:       39.9 ml  24.81 ml/m²    Aortic Valve Measurements:  AV Vmax:           2.3 m/s  AV Peak Gradient:  21.7 mmHg  AV Mean Gradient:  12.0 mmHg  AV VTI:            43.8 cm  AV VTI Ratio:      0.59  AoV EOA, Contin:   0.91 cm²  AoV EOA, Contin i: 0.57 cm²/m²    Mitral Valve Measurements:     MV Vmax:     0.95 m/s  MV Mean Grad: 1.00 mmHg  MV Peak Grad: 3.6 mmHg  MV E Vmax:    0.8 m/s  MV A Vmax:    0.8 m/s  MV E/A:       1.0       Tricuspid Valve Measurements:     TR Vmax:          2.3 m/s  TR Peak Gradient: 21.0 mmHg       Interval History:  Patient resting comfortably in bed   Denies CP/SOB/palpitations/dizziness  No acute events overnight    Medications:  atorvastatin 40 milliGRAM(s) Oral at bedtime  chlorhexidine 2% Cloths 1 Application(s) Topical <User Schedule>  dextrose 5%. 1000 milliLiter(s) IV Continuous <Continuous>  dextrose 5%. 1000 milliLiter(s) IV Continuous <Continuous>  dextrose 50% Injectable 12.5 Gram(s) IV Push once  dextrose 50% Injectable 25 Gram(s) IV Push once  dextrose 50% Injectable 25 Gram(s) IV Push once  dextrose Oral Gel 15 Gram(s) Oral once PRN  glucagon  Injectable 1 milliGRAM(s) IntraMuscular once  heparin   Injectable 5000 Unit(s) SubCutaneous every 8 hours  hydrocortisone sodium succinate Injectable 50 milliGRAM(s) IV Push every 6 hours  insulin lispro (ADMELOG) corrective regimen sliding scale   SubCutaneous three times a day before meals  insulin lispro (ADMELOG) corrective regimen sliding scale   SubCutaneous at bedtime  levothyroxine 125 MICROGram(s) Oral daily  melatonin 3 milliGRAM(s) Oral at bedtime PRN  norepinephrine Infusion 0.05 MICROgram(s)/kG/Min IV Continuous <Continuous>  pantoprazole    Tablet 40 milliGRAM(s) Oral before breakfast  piperacillin/tazobactam IVPB.. 3.375 Gram(s) IV Intermittent every 12 hours  polyethylene glycol 3350 17 Gram(s) Oral daily  senna 2 Tablet(s) Oral at bedtime  sodium bicarbonate 650 milliGRAM(s) Oral three times a day  vasopressin Infusion 0.04 Unit(s)/Min IV Continuous <Continuous>      Vitals:  T(C): 35 (09-25-23 @ 08:00), Max: 36.1 (09-24-23 @ 16:00)  HR: 65 (09-25-23 @ 09:00) (52 - 135)  BP: --  BP(mean): --  RR: 21 (09-25-23 @ 09:00) (12 - 29)  SpO2: 95% (09-25-23 @ 09:00) (89% - 100%)    Daily     Daily         I&O's Summary    24 Sep 2023 07:01  -  25 Sep 2023 07:00  --------------------------------------------------------  IN: 2940.2 mL / OUT: 505 mL / NET: 2435.2 mL    25 Sep 2023 07:01  -  25 Sep 2023 10:00  --------------------------------------------------------  IN: 5.6 mL / OUT: 20 mL / NET: -14.4 mL        Physical Exam:  Appearance: No Acute Distress  Neck: JVP 8-10  Cardiovascular: Normal S1 S2  Respiratory: Clear to auscultation bilaterally  Gastrointestinal: Soft, Non-tender	  Skin: No cyanosis	  Neurologic: Non-focal  Extremities:  LE edema    Labs:                        6.3    18.49 )-----------( 141      ( 25 Sep 2023 06:30 )             19.0     09-25    135  |  91<L>  |  58<H>  ----------------------------<  385<H>  4.0   |  14<L>  |  2.91<H>    Ca    7.7<L>      25 Sep 2023 00:25  Phos  5.8     09-25  Mg     2.20     09-25    TPro  5.6<L>  /  Alb  3.1<L>  /  TBili  1.3<H>  /  DBili  x   /  AST  1074<H>  /  ALT  589<H>  /  AlkPhos  942<H>  09-25    PT/INR - ( 25 Sep 2023 00:25 )   PT: 29.3 sec;   INR: 2.69 ratio         PTT - ( 25 Sep 2023 00:25 )  PTT:102.0 sec  CARDIAC MARKERS ( 24 Sep 2023 02:28 )  x     / x     / 52 U/L / x     / 8.5 ng/mL        TELEMETRY:    Echocardiogram:  TTE W or WO Ultrasound Enhancing Agent (09.23.23 @ 07:51)      CONCLUSIONS:      1. Left ventricular systolic function is normal with an ejection fraction of 55 % by Macedo's method of disks.   2. Based on visual assessment, the right ventricle appears mildly enlarged. reduced systolic function.   3. Borderline left ventricular hypertrophy.   4. Large pericardial effusion.   5. There is a large circumferential pericardial effusion measuring 1.8 cm posterior to the left ventricle in the parasternal view and appearsto measure ~1.5 cm anterior to the right ventricle in the subcostal view. The right ventricle does not appear to completely expand during diastole however there is no overt diastolic collapse. There is 23% respirophasic variation across the mitral valve. These findings are consistent with increased intrapericardial pressure however there is no evidence of overt tamponade physiology.    ________________________________________________________________________________________  FINDINGS:     Left Ventricle:  Left ventricular wall thickness is mildly increased. Left ventricular systolic function is normal with a calculated ejection fraction of 55 % by the Macedo's biplane method of disks. Borderline left ventricular hypertrophy.     Right Ventricle:  Based on visual assessment, the right ventricle appears mildly enlarged. Reduced systolic function. Tricuspid annular plane systolic excursion (TAPSE) is 1.3 cm (normal >=1.7 cm).     Aortic Valve:  The aortic valve appears trileaflet. There is calcification of the aortic valve leaflets. There is no aortic stenosis.     Tricuspid Valve:  There is trace tricuspid regurgitation.     Pulmonic Valve:  There is trace pulmonic regurgitation.     Pericardium:  There is a large circumferential pericardial effusion measuring 1.8 cm posterior to the left ventricle in the parasternal view and appears to measure ~1.5 cm anterior to the right ventricle in the subcostal view. The right ventricle does not appear to completely expand during diastole however there is no overt diastolic collapse. There is 23% respirophasic variation across the mitral valve. These findings are consistent with increased intrapericardial pressure however there is no evidence of overt tamponade physiology.    Pleura:  Large left pleural effusion noted.  ____________________________________________________________________  Quantitative Data:  Left Ventricle Measurements: (Indexed to BSA)     IVSd (2D):   1.3 cm  LVPWd (2D):  1.2 cm  LVIDd (2D):  3.6 cm  LVIDs (2D):  2.5cm  LV Mass:     153 g  95.1 g/m²  LV Vol d, MOD A2C: 36.9 ml 22.96 ml/m²  LV Vol d, MOD A4C: 44.2 ml 27.50 ml/m²  LV Vol d, MOD BP:  40.3 ml 25.07 ml/m²  LV Vol s, MOD A2C: 16.6 ml 10.33 ml/m²  LV Vol s, MOD A4C: 19.9 ml 12.38 ml/m²  LV Vol s, MOD BP:  18.1 ml 11.27 ml/m²  LVOT SV MOD BP:    22.2 ml  LV EF% MOD BP:     55 %     MV E Vmax:    0.81 m/s  MV A Vmax:    0.78 m/s  MV E/A:       1.04  e' lateral:   7.29 cm/s  e' medial:    8.27 cm/s  E/e' lateral: 11.07  E/e' medial:  9.76  E/e' Average: 10.37  MV DT:        230 msec    Aorta Measurements: (normal range) (Indexed to BSA)     Sinuses of Valsalva: 2.70 cm (2.7 - 3.3 cm)       Left Atrium Measurements: (Indexed to BSA)  LA Diam 2D: 3.30 cm    Right Ventricle Measurements:     TAPSE:        1.3 cm  TV Shanita. S':      9.36 cm/s  RV Base (RVID1): 3.6 cm       LVOT / RVOT/ Qp/Qs Data: (Indexed to BSA)  LVOT Diameter: 1.40 cm  LVOT Vmax:     1.61 m/s  LVOT VTI:      25.90 cm  LVOT SV:       39.9 ml  24.81 ml/m²    Aortic Valve Measurements:  AV Vmax:           2.3 m/s  AV Peak Gradient:  21.7 mmHg  AV Mean Gradient:  12.0 mmHg  AV VTI:            43.8 cm  AV VTI Ratio:      0.59  AoV EOA, Contin:   0.91 cm²  AoV EOA, Contin i: 0.57 cm²/m²    Mitral Valve Measurements:     MV Vmax:     0.95 m/s  MV Mean Grad: 1.00 mmHg  MV Peak Grad: 3.6 mmHg  MV E Vmax:    0.8 m/s  MV A Vmax:    0.8 m/s  MV E/A:       1.0       Tricuspid Valve Measurements:     TR Vmax:          2.3 m/s  TR Peak Gradient: 21.0 mmHg

## 2023-09-25 NOTE — PROGRESS NOTE ADULT - ATTENDING COMMENTS
58F with CKD, sev.pHTN, HFpEF presented with volume overload admitted to MICU for iHD with course c/b obstructive shock in the setting of pericardial tamponade now s/p pericardial drain with hemodynamic and clinical resolution     Neuro: Delirium precautions  CV: Obstructive shock secondary to pericardial effusion w/ tamponade physiology s/p pericardial drain with clinical improvement  - Repeat TTE pending   - Appreciate cardiolgy input  - Follow fluid studies, suspect from uremia  Pulm: Remains on room air, iHD to optimize volume status  GI: Renal diet  Renal, electrolytes; ARF on CKD c/b volume overload, metabolic disarray  - Cont trend lytes, HD, appreciate renal input  DVT ppx - hsq  GOC - full code

## 2023-09-25 NOTE — PROGRESS NOTE ADULT - PROBLEM SELECTOR PLAN 2
Pt. with metabolic acidosis in the setting of advanced kidney failure and lactic acidosis. SCO2 remains low at 14, and serum lactate remains elevated but decreased to 3.1 today. Plan for HD treatment today. Monitor SCO2.    If you have any questions, please feel free to contact me  Cody Mcguire  Nephrology Fellow  [phone] / [employer] Teams(Preferred)  (After 5pm or on weekends please page the on-call fellow).

## 2023-09-25 NOTE — PROGRESS NOTE ADULT - SUBJECTIVE AND OBJECTIVE BOX
INTERVAL HPI/OVERNIGHT EVENTS:    SUBJECTIVE: Patient seen and examined at bedside.       VITAL SIGNS:  ICU Vital Signs Last 24 Hrs  T(C): 34.7 (25 Sep 2023 04:00), Max: 36.1 (24 Sep 2023 16:00)  T(F): 94.5 (25 Sep 2023 04:00), Max: 97 (24 Sep 2023 16:00)  HR: 71 (25 Sep 2023 07:28) (52 - 135)  BP: 123/67 (24 Sep 2023 10:00) (60/46 - 123/67)  BP(mean): 84 (24 Sep 2023 10:00) (52 - 86)  ABP: 124/56 (25 Sep 2023 07:00) (11/7 - 151/66)  ABP(mean): 79 (25 Sep 2023 07:00) (-3 - 95)  RR: 16 (25 Sep 2023 07:00) (12 - 30)  SpO2: 99% (25 Sep 2023 07:28) (89% - 100%)    O2 Parameters below as of 24 Sep 2023 20:00  Patient On (Oxygen Delivery Method): nasal cannula w/ humidification  O2 Flow (L/min): 4          Plateau pressure:   P/F ratio:     09-24 @ 07:01  -  09-25 @ 07:00  --------------------------------------------------------  IN: 2940.2 mL / OUT: 505 mL / NET: 2435.2 mL      CAPILLARY BLOOD GLUCOSE      POCT Blood Glucose.: 380 mg/dL (24 Sep 2023 23:20)    ECG:    PHYSICAL EXAM:  VITALS:   T(C): 34.7 (09-25-23 @ 04:00), Max: 36.1 (09-24-23 @ 16:00)  HR: 71 (09-25-23 @ 07:28) (52 - 135)  BP: 123/67 (09-24-23 @ 10:00) (60/46 - 123/67)  RR: 16 (09-25-23 @ 07:00) (12 - 30)  SpO2: 99% (09-25-23 @ 07:28) (89% - 100%)    GENERAL: NAD, lying in bed comfortably  HEAD:  Atraumatic, Normocephalic  EYES: EOMI, PERRLA, conjunctiva and sclera clear  ENT: Moist mucous membranes  NECK: Supple, No JVD  CHEST/LUNG: CTABL; No rales, rhonchi, wheezing, or rubs. Unlabored respirations  HEART: RRR. No M/R/G  ABDOMEN: Soft, nontender, non-distended, normoactive BS. No hepatomegaly  EXTREMITIES:  2+ Peripheral Pulses, brisk capillary refill. No clubbing, cyanosis, or edema  NERVOUS SYSTEM:  Alert & Oriented X3, speech clear. No deficits, CN II-XII intact. Normal sensation   MSK: FROM all 4 extremities, full and equal strength  PSYCH: Normal affect, normal speech, normal behavior  SKIN: No rashes or lesions      MEDICATIONS:  MEDICATIONS  (STANDING):  atorvastatin 40 milliGRAM(s) Oral at bedtime  chlorhexidine 2% Cloths 1 Application(s) Topical <User Schedule>  dextrose 5%. 1000 milliLiter(s) (100 mL/Hr) IV Continuous <Continuous>  dextrose 5%. 1000 milliLiter(s) (50 mL/Hr) IV Continuous <Continuous>  dextrose 50% Injectable 25 Gram(s) IV Push once  dextrose 50% Injectable 12.5 Gram(s) IV Push once  dextrose 50% Injectable 25 Gram(s) IV Push once  glucagon  Injectable 1 milliGRAM(s) IntraMuscular once  heparin   Injectable 5000 Unit(s) SubCutaneous every 8 hours  hydrocortisone sodium succinate Injectable 50 milliGRAM(s) IV Push every 6 hours  insulin lispro (ADMELOG) corrective regimen sliding scale   SubCutaneous three times a day before meals  insulin lispro (ADMELOG) corrective regimen sliding scale   SubCutaneous at bedtime  levothyroxine 125 MICROGram(s) Oral daily  norepinephrine Infusion 0.05 MICROgram(s)/kG/Min (2.85 mL/Hr) IV Continuous <Continuous>  pantoprazole    Tablet 40 milliGRAM(s) Oral before breakfast  piperacillin/tazobactam IVPB.. 3.375 Gram(s) IV Intermittent every 12 hours  polyethylene glycol 3350 17 Gram(s) Oral daily  senna 2 Tablet(s) Oral at bedtime  sodium bicarbonate 650 milliGRAM(s) Oral three times a day  vasopressin Infusion 0.04 Unit(s)/Min (6 mL/Hr) IV Continuous <Continuous>    MEDICATIONS  (PRN):  dextrose Oral Gel 15 Gram(s) Oral once PRN Blood Glucose LESS THAN 70 milliGRAM(s)/deciliter  melatonin 3 milliGRAM(s) Oral at bedtime PRN Insomnia      ALLERGIES:  Allergies    No Known Allergies    Intolerances        LABS:                        6.9    26.84 )-----------( 219      ( 25 Sep 2023 00:25 )             21.8     09-25    135  |  91<L>  |  58<H>  ----------------------------<  385<H>  4.0   |  14<L>  |  2.91<H>    Ca    7.7<L>      25 Sep 2023 00:25  Phos  5.8     09-25  Mg     2.20     09-25    TPro  5.6<L>  /  Alb  3.1<L>  /  TBili  1.3<H>  /  DBili  x   /  AST  1074<H>  /  ALT  589<H>  /  AlkPhos  942<H>  09-25    PT/INR - ( 25 Sep 2023 00:25 )   PT: 29.3 sec;   INR: 2.69 ratio         PTT - ( 25 Sep 2023 00:25 )  PTT:102.0 sec  Urinalysis Basic - ( 25 Sep 2023 00:25 )    Color: x / Appearance: x / SG: x / pH: x  Gluc: 385 mg/dL / Ketone: x  / Bili: x / Urobili: x   Blood: x / Protein: x / Nitrite: x   Leuk Esterase: x / RBC: x / WBC x   Sq Epi: x / Non Sq Epi: x / Bacteria: x        RADIOLOGY & ADDITIONAL TESTS: Reviewed. INTERVAL HPI/OVERNIGHT EVENTS:  Over the weekend, patient had hypotension to 60/40s i/s/o TTE remarkable for large pericardial effusion. Started on Levophed, s/p 1L. Cardiology consulted, drain inserted on 09/24.     SUBJECTIVE: Patient seen and examined at bedside. No acute events overnight. Patient with decreased pressor requirement, on 0.05 at 2.8 cc/h at time of exam. No acute complaints. Going for hemodialysis today.       VITAL SIGNS:  ICU Vital Signs Last 24 Hrs  T(C): 34.7 (25 Sep 2023 04:00), Max: 36.1 (24 Sep 2023 16:00)  T(F): 94.5 (25 Sep 2023 04:00), Max: 97 (24 Sep 2023 16:00)  HR: 71 (25 Sep 2023 07:28) (52 - 135)  BP: 123/67 (24 Sep 2023 10:00) (60/46 - 123/67)  BP(mean): 84 (24 Sep 2023 10:00) (52 - 86)  ABP: 124/56 (25 Sep 2023 07:00) (11/7 - 151/66)  ABP(mean): 79 (25 Sep 2023 07:00) (-3 - 95)  RR: 16 (25 Sep 2023 07:00) (12 - 30)  SpO2: 99% (25 Sep 2023 07:28) (89% - 100%)    O2 Parameters below as of 24 Sep 2023 20:00  Patient On (Oxygen Delivery Method): nasal cannula w/ humidification  O2 Flow (L/min): 4          Plateau pressure:   P/F ratio:     09-24 @ 07:01  -  09-25 @ 07:00  --------------------------------------------------------  IN: 2940.2 mL / OUT: 505 mL / NET: 2435.2 mL      CAPILLARY BLOOD GLUCOSE      POCT Blood Glucose.: 380 mg/dL (24 Sep 2023 23:20)    ECG:    PHYSICAL EXAM:  VITALS:   T(C): 34.7 (09-25-23 @ 04:00), Max: 36.1 (09-24-23 @ 16:00)  HR: 71 (09-25-23 @ 07:28) (52 - 135)  BP: 123/67 (09-24-23 @ 10:00) (60/46 - 123/67)  RR: 16 (09-25-23 @ 07:00) (12 - 30)  SpO2: 99% (09-25-23 @ 07:28) (89% - 100%)    GENERAL: NAD, lying in bed comfortably  HEAD:  Atraumatic, Normocephalic  EYES: EOMI, PERRLA, conjunctiva and sclera clear  ENT: Moist mucous membranes  NECK: Supple, No JVD  CHEST/LUNG: CTABL; No rales, rhonchi, wheezing, or rubs. Unlabored respirations  HEART: RRR. No M/R/G  ABDOMEN: Soft, nontender, non-distended, normoactive BS. No hepatomegaly  EXTREMITIES:  2+ Peripheral Pulses, brisk capillary refill. No clubbing, cyanosis, or edema  NERVOUS SYSTEM:  Alert & Oriented X3, speech clear. No deficits, CN II-XII intact. Normal sensation   MSK: FROM all 4 extremities, full and equal strength  PSYCH: Normal affect, normal speech, normal behavior  SKIN: No rashes or lesions      MEDICATIONS:  MEDICATIONS  (STANDING):  atorvastatin 40 milliGRAM(s) Oral at bedtime  chlorhexidine 2% Cloths 1 Application(s) Topical <User Schedule>  dextrose 5%. 1000 milliLiter(s) (100 mL/Hr) IV Continuous <Continuous>  dextrose 5%. 1000 milliLiter(s) (50 mL/Hr) IV Continuous <Continuous>  dextrose 50% Injectable 25 Gram(s) IV Push once  dextrose 50% Injectable 12.5 Gram(s) IV Push once  dextrose 50% Injectable 25 Gram(s) IV Push once  glucagon  Injectable 1 milliGRAM(s) IntraMuscular once  heparin   Injectable 5000 Unit(s) SubCutaneous every 8 hours  hydrocortisone sodium succinate Injectable 50 milliGRAM(s) IV Push every 6 hours  insulin lispro (ADMELOG) corrective regimen sliding scale   SubCutaneous three times a day before meals  insulin lispro (ADMELOG) corrective regimen sliding scale   SubCutaneous at bedtime  levothyroxine 125 MICROGram(s) Oral daily  norepinephrine Infusion 0.05 MICROgram(s)/kG/Min (2.85 mL/Hr) IV Continuous <Continuous>  pantoprazole    Tablet 40 milliGRAM(s) Oral before breakfast  piperacillin/tazobactam IVPB.. 3.375 Gram(s) IV Intermittent every 12 hours  polyethylene glycol 3350 17 Gram(s) Oral daily  senna 2 Tablet(s) Oral at bedtime  sodium bicarbonate 650 milliGRAM(s) Oral three times a day  vasopressin Infusion 0.04 Unit(s)/Min (6 mL/Hr) IV Continuous <Continuous>    MEDICATIONS  (PRN):  dextrose Oral Gel 15 Gram(s) Oral once PRN Blood Glucose LESS THAN 70 milliGRAM(s)/deciliter  melatonin 3 milliGRAM(s) Oral at bedtime PRN Insomnia      ALLERGIES:  Allergies    No Known Allergies    Intolerances        LABS:                        6.9    26.84 )-----------( 219      ( 25 Sep 2023 00:25 )             21.8     09-25    135  |  91<L>  |  58<H>  ----------------------------<  385<H>  4.0   |  14<L>  |  2.91<H>    Ca    7.7<L>      25 Sep 2023 00:25  Phos  5.8     09-25  Mg     2.20     09-25    TPro  5.6<L>  /  Alb  3.1<L>  /  TBili  1.3<H>  /  DBili  x   /  AST  1074<H>  /  ALT  589<H>  /  AlkPhos  942<H>  09-25    PT/INR - ( 25 Sep 2023 00:25 )   PT: 29.3 sec;   INR: 2.69 ratio         PTT - ( 25 Sep 2023 00:25 )  PTT:102.0 sec  Urinalysis Basic - ( 25 Sep 2023 00:25 )    Color: x / Appearance: x / SG: x / pH: x  Gluc: 385 mg/dL / Ketone: x  / Bili: x / Urobili: x   Blood: x / Protein: x / Nitrite: x   Leuk Esterase: x / RBC: x / WBC x   Sq Epi: x / Non Sq Epi: x / Bacteria: x        RADIOLOGY & ADDITIONAL TESTS:     < from: TTE Limited W or WO Ultrasound Enhancing Agent (09.24.23 @ 10:14) >  TRANSTHORACIC ECHOCARDIOGRAM REPORT  ________________________________________________________________________________                                      _______       Pt. Name:       LAUREN CARRILLO Study Date:    9/24/2023  MRN:  [id number]               YOB: 1965  Accession #:    [id number]               Age:           58 years  Account#:       [id number]                Gender:        F  Heart Rate:                             Height:        62.00 in (157.48 cm)  Rhythm:                                 Weight:        140.00 lb (63.50 kg)  Blood Pressure: 106/67 mmHg             BSA/BMI:       1.64 m² / 25.61 kg/m²  ________________________________________________________________________________________  Referring Physician:    [id number] Sofía Pearce  Interpreting Physician: Chris Rivas M.D.  Primary Sonographer:    Asuncion Welch RDCS    CPT:               ECHO TTE W/O CON F/U LTD - 99179.m;[facility] SPECTRAL - 61055.  Indication(s):     Cardiac tamponade - I31.4  Procedure:         Limited transthoracic echocardiogram.  Ordering Location: [facility]  Study Information: Image quality for this study is good.    _______________________________________________________________________________________     CONCLUSIONS:      1. Large pericardial effusion, measuring ~ 2.2 cm posterior to the left ventricle. Moderate pericardial effusion, measuring ~ 1.1 cm anterior to the right ventricle, ~ 1.7 cm adjacent to the RV free wall, ~ 1.4 cm lateral to the left ventricle, and ~ 1.3 cm around the LV apex. The right atrium and right ventricle appear partially compressed in the subcostal view. This may be consistent with early tamponade physiology. Unable to interpret respirophasic variability in the transmitral and transtricuspid spectral Doppler signals due to the presence of frequent premature supraventricular complexes.   2. Left pleural effusion noted.   3. Compared to the transthoracic echocardiogram performed on 9/23/2023 the pericardial effusion appears slightly larger on the current study.    ________________________________________________________________________________________  FINDINGS:     Pericardium:  Large pericardial effusion, measuring ~ 2.2 cm posterior to the left ventricle. Moderate pericardial effusion, measuring ~ 1.1 cm anterior to the right ventricle, ~ 1.7 cm adjacent to the RV free wall, ~ 1.4 cm lateral to the left ventricle, and ~ 1.3 cm around the LV apex. The right atrium and right ventricle appear partially compressed in the subcostal view. This may be consistent with early tamponade physiology. Unable to interpret respirophasic variability in the transmitral and transtricuspid spectral Doppler signals due to the presence of frequent premature supraventricular complexes.     Pleura:  Left pleural effusion noted.  ________________________________________________________________________________________  Electronically signed on 9/24/2023 at 11:09:28 AM by Chris Rivas M.D.         *** Final ***    < end of copied text >  < from: CT Angio Abdomen and Pelvis w/ IV Cont (09.24.23 @ 20:54) >  ACC: [id number] EXAM:  CT CHEST IC   ORDERED BY: SANDRA MENENDEZ     ACC: [id number] EXAM:  CT ANGIO ABD PELV (W)AW IC   ORDERED BY: UMM JACKSON     PROCEDURE DATE:  09/24/2023          INTERPRETATION:  CLINICAL INFORMATION: Rising lactate. Assess for bleed   and ischemia.    COMPARISON: CT abdomen pelvis 9/21/2023    CONTRAST/COMPLICATIONS:  IV Contrast: Omnipaque 350 90 cc administered   10 cc discarded  Oral Contrast: NONE  Complications: None reported at time of study completion    PROCEDURE:  CTAngiography of the Abdomen and Pelvis was performed.  Arterial and venous phases were acquired.  Sagittal and coronal reformats were performed as well as 3D (MIP)   reconstructions.    FINDINGS:    FINDINGS:  CHEST:  LUNGS AND LARGE AIRWAYS: Patent central airways. Left lower lobe collapse   and subsegmental atelectasis of lingula and right lower lobe. Aerated   portion of the bilateral lungs demonstrating groundglass opacities  PLEURA: Moderate bilateral pleural effusions, right worse than left.  VESSELS: Bilateral central lines terminating in the SVC/cavoatrial   junction.  HEART: Heart size is normal. Subxiphoid pericardial drain and interval   decrease of pericardial effusion compared to prior.  MEDIASTINUM AND ANJELICA: No lymphadenopathy.  CHEST WALL AND LOWER NECK: Within normal limits.    ABDOMEN/PELVIS:  LIVER: Within normal limits.  BILE DUCTS: Normal caliber.  GALLBLADDER: Cholelithiasis.  SPLEEN: Within normal limits.  PANCREAS: Within normal limits.  ADRENALS: Within normal limits.  KIDNEYS/URETERS: Within normal limits.    BLADDER: Mild distention with Singh catheter.  REPRODUCTIVE ORGANS: Uterus and adnexa within normal limits.    BOWEL: Mural thickening of the stomach, small bowel, and colon. No   abnormal hypo-/nonenhancement of wall of the GI tract. No pneumatosis or   pneumoperitoneum. Normal appendix No bowel obstruction.  PERITONEUM: Interval increase in small ascites.  VESSELS: Patent mesenteric vasculature, though mild stenosis of the FREDA.   Atherosclerotic changes. Similar dilatation of hepatic veins and IVC.  RETROPERITONEUM/LYMPH NODES: No lymphadenopathy.  ABDOMINAL WALL: Subcutaneous soft tissue edema. Soft tissue nodules in   the anterior abdomen likely injection sites.  BONES: Degenerative changes.    IMPRESSION:  1.  Mural thickening of the stomach, small bowel, and colon. Findings may   be secondary to fluid overload state versus enterocolitis.  2.  No evidence of mesenteric ischemia or acute GI bleed.  3.  Moderate bilateral pleural effusions, interval increase of small   ascites and new anasarca, suggestive of fluid overload state.  4.  Subxiphoid pericardial drain with interval decrease of pericardial   effusion.    --- End of Report ---          EFFIE STEWART MD; Resident Radiologist  Thisdocument has been electronically signed.  ELDA DALEY MD; Attending Radiologist  This document has been electronically signed. Sep 25 2023  9:38AM    < end of copied text >   INTERVAL HPI/OVERNIGHT EVENTS:  Over the weekend, patient had hypotension to 60/40s i/s/o TTE remarkable for large pericardial effusion. Started on Levophed, s/p 1L. Cardiology consulted, drain inserted on 09/24.     SUBJECTIVE: Patient seen and examined at bedside. No acute events overnight. Patient with decreased pressor requirement, on levophed 0.05 at 2.8 cc/h at time of exam. No acute complaints. Going for hemodialysis today.       VITAL SIGNS:  ICU Vital Signs Last 24 Hrs  T(C): 34.7 (25 Sep 2023 04:00), Max: 36.1 (24 Sep 2023 16:00)  T(F): 94.5 (25 Sep 2023 04:00), Max: 97 (24 Sep 2023 16:00)  HR: 71 (25 Sep 2023 07:28) (52 - 135)  BP: 123/67 (24 Sep 2023 10:00) (60/46 - 123/67)  BP(mean): 84 (24 Sep 2023 10:00) (52 - 86)  ABP: 124/56 (25 Sep 2023 07:00) (11/7 - 151/66)  ABP(mean): 79 (25 Sep 2023 07:00) (-3 - 95)  RR: 16 (25 Sep 2023 07:00) (12 - 30)  SpO2: 99% (25 Sep 2023 07:28) (89% - 100%)    O2 Parameters below as of 24 Sep 2023 20:00  Patient On (Oxygen Delivery Method): nasal cannula w/ humidification  O2 Flow (L/min): 4          Plateau pressure:   P/F ratio:     09-24 @ 07:01  -  09-25 @ 07:00  --------------------------------------------------------  IN: 2940.2 mL / OUT: 505 mL / NET: 2435.2 mL      CAPILLARY BLOOD GLUCOSE      POCT Blood Glucose.: 380 mg/dL (24 Sep 2023 23:20)    ECG:    PHYSICAL EXAM:  VITALS:   T(C): 34.7 (09-25-23 @ 04:00), Max: 36.1 (09-24-23 @ 16:00)  HR: 71 (09-25-23 @ 07:28) (52 - 135)  BP: 123/67 (09-24-23 @ 10:00) (60/46 - 123/67)  RR: 16 (09-25-23 @ 07:00) (12 - 30)  SpO2: 99% (09-25-23 @ 07:28) (89% - 100%)    GENERAL: NAD, lying in bed comfortably  HEAD:  Atraumatic, Normocephalic  EYES: EOMI, PERRLA, conjunctiva and sclera clear  ENT: Moist mucous membranes  NECK: Supple, No JVD  CHEST/LUNG: CTABL; No rales, rhonchi, wheezing, or rubs. Unlabored respirations  HEART: RRR. No M/R/G  ABDOMEN: Soft, nontender, non-distended, normoactive BS. No hepatomegaly  EXTREMITIES:  2+ Peripheral Pulses, brisk capillary refill. No clubbing, cyanosis, or edema  NERVOUS SYSTEM:  Alert & Oriented X3, speech clear. No deficits, CN II-XII intact. Normal sensation   MSK: FROM all 4 extremities, full and equal strength  PSYCH: Normal affect, normal speech, normal behavior  SKIN: No rashes or lesions      MEDICATIONS:  MEDICATIONS  (STANDING):  atorvastatin 40 milliGRAM(s) Oral at bedtime  chlorhexidine 2% Cloths 1 Application(s) Topical <User Schedule>  dextrose 5%. 1000 milliLiter(s) (100 mL/Hr) IV Continuous <Continuous>  dextrose 5%. 1000 milliLiter(s) (50 mL/Hr) IV Continuous <Continuous>  dextrose 50% Injectable 25 Gram(s) IV Push once  dextrose 50% Injectable 12.5 Gram(s) IV Push once  dextrose 50% Injectable 25 Gram(s) IV Push once  glucagon  Injectable 1 milliGRAM(s) IntraMuscular once  heparin   Injectable 5000 Unit(s) SubCutaneous every 8 hours  hydrocortisone sodium succinate Injectable 50 milliGRAM(s) IV Push every 6 hours  insulin lispro (ADMELOG) corrective regimen sliding scale   SubCutaneous three times a day before meals  insulin lispro (ADMELOG) corrective regimen sliding scale   SubCutaneous at bedtime  levothyroxine 125 MICROGram(s) Oral daily  norepinephrine Infusion 0.05 MICROgram(s)/kG/Min (2.85 mL/Hr) IV Continuous <Continuous>  pantoprazole    Tablet 40 milliGRAM(s) Oral before breakfast  piperacillin/tazobactam IVPB.. 3.375 Gram(s) IV Intermittent every 12 hours  polyethylene glycol 3350 17 Gram(s) Oral daily  senna 2 Tablet(s) Oral at bedtime  sodium bicarbonate 650 milliGRAM(s) Oral three times a day  vasopressin Infusion 0.04 Unit(s)/Min (6 mL/Hr) IV Continuous <Continuous>    MEDICATIONS  (PRN):  dextrose Oral Gel 15 Gram(s) Oral once PRN Blood Glucose LESS THAN 70 milliGRAM(s)/deciliter  melatonin 3 milliGRAM(s) Oral at bedtime PRN Insomnia      ALLERGIES:  Allergies    No Known Allergies    Intolerances        LABS:                        6.9    26.84 )-----------( 219      ( 25 Sep 2023 00:25 )             21.8     09-25    135  |  91<L>  |  58<H>  ----------------------------<  385<H>  4.0   |  14<L>  |  2.91<H>    Ca    7.7<L>      25 Sep 2023 00:25  Phos  5.8     09-25  Mg     2.20     09-25    TPro  5.6<L>  /  Alb  3.1<L>  /  TBili  1.3<H>  /  DBili  x   /  AST  1074<H>  /  ALT  589<H>  /  AlkPhos  942<H>  09-25    PT/INR - ( 25 Sep 2023 00:25 )   PT: 29.3 sec;   INR: 2.69 ratio         PTT - ( 25 Sep 2023 00:25 )  PTT:102.0 sec  Urinalysis Basic - ( 25 Sep 2023 00:25 )    Color: x / Appearance: x / SG: x / pH: x  Gluc: 385 mg/dL / Ketone: x  / Bili: x / Urobili: x   Blood: x / Protein: x / Nitrite: x   Leuk Esterase: x / RBC: x / WBC x   Sq Epi: x / Non Sq Epi: x / Bacteria: x        RADIOLOGY & ADDITIONAL TESTS:     < from: TTE Limited W or WO Ultrasound Enhancing Agent (09.24.23 @ 10:14) >  TRANSTHORACIC ECHOCARDIOGRAM REPORT  ________________________________________________________________________________                                      _______       Pt. Name:       LAUREN CARRILLO Study Date:    9/24/2023  MRN:  [id number]               YOB: 1965  Accession #:    [id number]               Age:           58 years  Account#:       [id number]                Gender:        F  Heart Rate:                             Height:        62.00 in (157.48 cm)  Rhythm:                                 Weight:        140.00 lb (63.50 kg)  Blood Pressure: 106/67 mmHg             BSA/BMI:       1.64 m² / 25.61 kg/m²  ________________________________________________________________________________________  Referring Physician:    [id number] Sofía Pearce  Interpreting Physician: Chris Rivas M.D.  Primary Sonographer:    Asuncion Welch TAHIRA    CPT:               ECHO TTE W/O CON F/U LTD - 83675.m;[facility] SPECTRAL - 25939.m  Indication(s):     Cardiac tamponade - I31.4  Procedure:         Limited transthoracic echocardiogram.  Ordering Location: [facility]  Study Information: Image quality for this study is good.    _______________________________________________________________________________________     CONCLUSIONS:      1. Large pericardial effusion, measuring ~ 2.2 cm posterior to the left ventricle. Moderate pericardial effusion, measuring ~ 1.1 cm anterior to the right ventricle, ~ 1.7 cm adjacent to the RV free wall, ~ 1.4 cm lateral to the left ventricle, and ~ 1.3 cm around the LV apex. The right atrium and right ventricle appear partially compressed in the subcostal view. This may be consistent with early tamponade physiology. Unable to interpret respirophasic variability in the transmitral and transtricuspid spectral Doppler signals due to the presence of frequent premature supraventricular complexes.   2. Left pleural effusion noted.   3. Compared to the transthoracic echocardiogram performed on 9/23/2023 the pericardial effusion appears slightly larger on the current study.    ________________________________________________________________________________________  FINDINGS:     Pericardium:  Large pericardial effusion, measuring ~ 2.2 cm posterior to the left ventricle. Moderate pericardial effusion, measuring ~ 1.1 cm anterior to the right ventricle, ~ 1.7 cm adjacent to the RV free wall, ~ 1.4 cm lateral to the left ventricle, and ~ 1.3 cm around the LV apex. The right atrium and right ventricle appear partially compressed in the subcostal view. This may be consistent with early tamponade physiology. Unable to interpret respirophasic variability in the transmitral and transtricuspid spectral Doppler signals due to the presence of frequent premature supraventricular complexes.     Pleura:  Left pleural effusion noted.  ________________________________________________________________________________________  Electronically signed on 9/24/2023 at 11:09:28 AM by Chris Rivas M.D.         *** Final ***    < end of copied text >  < from: CT Angio Abdomen and Pelvis w/ IV Cont (09.24.23 @ 20:54) >  ACC: [id number] EXAM:  CT CHEST IC   ORDERED BY: SANDRA MENENDEZ     ACC: [id number] EXAM:  CT ANGIO ABD PELV (W)AW IC   ORDERED BY: UMM JACKSON     PROCEDURE DATE:  09/24/2023          INTERPRETATION:  CLINICAL INFORMATION: Rising lactate. Assess for bleed   and ischemia.    COMPARISON: CT abdomen pelvis 9/21/2023    CONTRAST/COMPLICATIONS:  IV Contrast: Omnipaque 350 90 cc administered   10 cc discarded  Oral Contrast: NONE  Complications: None reported at time of study completion    PROCEDURE:  CTAngiography of the Abdomen and Pelvis was performed.  Arterial and venous phases were acquired.  Sagittal and coronal reformats were performed as well as 3D (MIP)   reconstructions.    FINDINGS:    FINDINGS:  CHEST:  LUNGS AND LARGE AIRWAYS: Patent central airways. Left lower lobe collapse   and subsegmental atelectasis of lingula and right lower lobe. Aerated   portion of the bilateral lungs demonstrating groundglass opacities  PLEURA: Moderate bilateral pleural effusions, right worse than left.  VESSELS: Bilateral central lines terminating in the SVC/cavoatrial   junction.  HEART: Heart size is normal. Subxiphoid pericardial drain and interval   decrease of pericardial effusion compared to prior.  MEDIASTINUM AND ANJELICA: No lymphadenopathy.  CHEST WALL AND LOWER NECK: Within normal limits.    ABDOMEN/PELVIS:  LIVER: Within normal limits.  BILE DUCTS: Normal caliber.  GALLBLADDER: Cholelithiasis.  SPLEEN: Within normal limits.  PANCREAS: Within normal limits.  ADRENALS: Within normal limits.  KIDNEYS/URETERS: Within normal limits.    BLADDER: Mild distention with Singh catheter.  REPRODUCTIVE ORGANS: Uterus and adnexa within normal limits.    BOWEL: Mural thickening of the stomach, small bowel, and colon. No   abnormal hypo-/nonenhancement of wall of the GI tract. No pneumatosis or   pneumoperitoneum. Normal appendix No bowel obstruction.  PERITONEUM: Interval increase in small ascites.  VESSELS: Patent mesenteric vasculature, though mild stenosis of the FREDA.   Atherosclerotic changes. Similar dilatation of hepatic veins and IVC.  RETROPERITONEUM/LYMPH NODES: No lymphadenopathy.  ABDOMINAL WALL: Subcutaneous soft tissue edema. Soft tissue nodules in   the anterior abdomen likely injection sites.  BONES: Degenerative changes.    IMPRESSION:  1.  Mural thickening of the stomach, small bowel, and colon. Findings may   be secondary to fluid overload state versus enterocolitis.  2.  No evidence of mesenteric ischemia or acute GI bleed.  3.  Moderate bilateral pleural effusions, interval increase of small   ascites and new anasarca, suggestive of fluid overload state.  4.  Subxiphoid pericardial drain with interval decrease of pericardial   effusion.    --- End of Report ---          EFFIE STEWART MD; Resident Radiologist  Thisdocument has been electronically signed.  ELDA DALEY MD; Attending Radiologist  This document has been electronically signed. Sep 25 2023  9:38AM    < end of copied text >   INTERVAL HPI/OVERNIGHT EVENTS:  Over the weekend, patient had hypotension to 60/40s i/s/o TTE remarkable for large pericardial effusion. Started on Levophed, s/p 1L. Cardiology consulted, drain inserted on 09/24.     SUBJECTIVE: Patient seen and examined at bedside. No acute events overnight. Patient with decreased pressor requirement, on levophed 0.05 at 2.8 cc/h at time of exam. No acute complaints. Going for hemodialysis today, removed 1.4 L. Off pressors during & after HD.       VITAL SIGNS:  ICU Vital Signs Last 24 Hrs  T(C): 34.7 (25 Sep 2023 04:00), Max: 36.1 (24 Sep 2023 16:00)  T(F): 94.5 (25 Sep 2023 04:00), Max: 97 (24 Sep 2023 16:00)  HR: 71 (25 Sep 2023 07:28) (52 - 135)  BP: 123/67 (24 Sep 2023 10:00) (60/46 - 123/67)  BP(mean): 84 (24 Sep 2023 10:00) (52 - 86)  ABP: 124/56 (25 Sep 2023 07:00) (11/7 - 151/66)  ABP(mean): 79 (25 Sep 2023 07:00) (-3 - 95)  RR: 16 (25 Sep 2023 07:00) (12 - 30)  SpO2: 99% (25 Sep 2023 07:28) (89% - 100%)    O2 Parameters below as of 24 Sep 2023 20:00  Patient On (Oxygen Delivery Method): nasal cannula w/ humidification  O2 Flow (L/min): 4          Plateau pressure:   P/F ratio:     09-24 @ 07:01  -  09-25 @ 07:00  --------------------------------------------------------  IN: 2940.2 mL / OUT: 505 mL / NET: 2435.2 mL      CAPILLARY BLOOD GLUCOSE      POCT Blood Glucose.: 380 mg/dL (24 Sep 2023 23:20)    ECG:    PHYSICAL EXAM:  VITALS:   T(C): 34.7 (09-25-23 @ 04:00), Max: 36.1 (09-24-23 @ 16:00)  HR: 71 (09-25-23 @ 07:28) (52 - 135)  BP: 123/67 (09-24-23 @ 10:00) (60/46 - 123/67)  RR: 16 (09-25-23 @ 07:00) (12 - 30)  SpO2: 99% (09-25-23 @ 07:28) (89% - 100%)    GENERAL: Laying comfortably, NAD  EYES: EOMI, PERRL, no scleral icterus  NECK: No JVD  LUNG: Clear to auscultation bilaterally; No wheeze, crackles or rhonci  HEART: Regular rate and rhythm; No murmurs, rubs, or gallops  ABDOMEN: Soft, Nontender, Nondistended  EXTREMITIES:  No LE edema, 2+ Peripheral Pulses, No clubbing, cyanosis, or edema  PSYCH: AAOx3  NEUROLOGY: non-focal, strength 5/5 in all extremities, sensation intact  SKIN: No rashes or lesions      MEDICATIONS:  MEDICATIONS  (STANDING):  atorvastatin 40 milliGRAM(s) Oral at bedtime  chlorhexidine 2% Cloths 1 Application(s) Topical <User Schedule>  dextrose 5%. 1000 milliLiter(s) (100 mL/Hr) IV Continuous <Continuous>  dextrose 5%. 1000 milliLiter(s) (50 mL/Hr) IV Continuous <Continuous>  dextrose 50% Injectable 25 Gram(s) IV Push once  dextrose 50% Injectable 12.5 Gram(s) IV Push once  dextrose 50% Injectable 25 Gram(s) IV Push once  glucagon  Injectable 1 milliGRAM(s) IntraMuscular once  heparin   Injectable 5000 Unit(s) SubCutaneous every 8 hours  hydrocortisone sodium succinate Injectable 50 milliGRAM(s) IV Push every 6 hours  insulin lispro (ADMELOG) corrective regimen sliding scale   SubCutaneous three times a day before meals  insulin lispro (ADMELOG) corrective regimen sliding scale   SubCutaneous at bedtime  levothyroxine 125 MICROGram(s) Oral daily  norepinephrine Infusion 0.05 MICROgram(s)/kG/Min (2.85 mL/Hr) IV Continuous <Continuous>  pantoprazole    Tablet 40 milliGRAM(s) Oral before breakfast  piperacillin/tazobactam IVPB.. 3.375 Gram(s) IV Intermittent every 12 hours  polyethylene glycol 3350 17 Gram(s) Oral daily  senna 2 Tablet(s) Oral at bedtime  sodium bicarbonate 650 milliGRAM(s) Oral three times a day  vasopressin Infusion 0.04 Unit(s)/Min (6 mL/Hr) IV Continuous <Continuous>    MEDICATIONS  (PRN):  dextrose Oral Gel 15 Gram(s) Oral once PRN Blood Glucose LESS THAN 70 milliGRAM(s)/deciliter  melatonin 3 milliGRAM(s) Oral at bedtime PRN Insomnia      ALLERGIES:  Allergies    No Known Allergies    Intolerances        LABS:                        6.9    26.84 )-----------( 219      ( 25 Sep 2023 00:25 )             21.8     09-25    135  |  91<L>  |  58<H>  ----------------------------<  385<H>  4.0   |  14<L>  |  2.91<H>    Ca    7.7<L>      25 Sep 2023 00:25  Phos  5.8     09-25  Mg     2.20     09-25    TPro  5.6<L>  /  Alb  3.1<L>  /  TBili  1.3<H>  /  DBili  x   /  AST  1074<H>  /  ALT  589<H>  /  AlkPhos  942<H>  09-25    PT/INR - ( 25 Sep 2023 00:25 )   PT: 29.3 sec;   INR: 2.69 ratio         PTT - ( 25 Sep 2023 00:25 )  PTT:102.0 sec  Urinalysis Basic - ( 25 Sep 2023 00:25 )    Color: x / Appearance: x / SG: x / pH: x  Gluc: 385 mg/dL / Ketone: x  / Bili: x / Urobili: x   Blood: x / Protein: x / Nitrite: x   Leuk Esterase: x / RBC: x / WBC x   Sq Epi: x / Non Sq Epi: x / Bacteria: x        RADIOLOGY & ADDITIONAL TESTS:     < from: TTE Limited W or WO Ultrasound Enhancing Agent (09.24.23 @ 10:14) >  TRANSTHORACIC ECHOCARDIOGRAM REPORT  ________________________________________________________________________________                                      _______       Pt. Name:       LAUREN CARRILLO Study Date:    9/24/2023  MRN:  [id number]               YOB: 1965  Accession #:    [id number]               Age:           58 years  Account#:       [id number]                Gender:        F  Heart Rate:                             Height:        62.00 in (157.48 cm)  Rhythm:                                 Weight:        140.00 lb (63.50 kg)  Blood Pressure: 106/67 mmHg             BSA/BMI:       1.64 m² / 25.61 kg/m²  ________________________________________________________________________________________  Referring Physician:    [id number] Sofía Pearce  Interpreting Physician: Chris Rivas M.D.  Primary Sonographer:    Asuncion Welch TAHIRA    CPT:               ECHO TTE W/O CON F/U LTD - 75015.m;[facility] SPECTRAL - 93225.  Indication(s):     Cardiac tamponade - I31.4  Procedure:         Limited transthoracic echocardiogram.  Ordering Location: [facility]  Study Information: Image quality for this study is good.    _______________________________________________________________________________________     CONCLUSIONS:      1. Large pericardial effusion, measuring ~ 2.2 cm posterior to the left ventricle. Moderate pericardial effusion, measuring ~ 1.1 cm anterior to the right ventricle, ~ 1.7 cm adjacent to the RV free wall, ~ 1.4 cm lateral to the left ventricle, and ~ 1.3 cm around the LV apex. The right atrium and right ventricle appear partially compressed in the subcostal view. This may be consistent with early tamponade physiology. Unable to interpret respirophasic variability in the transmitral and transtricuspid spectral Doppler signals due to the presence of frequent premature supraventricular complexes.   2. Left pleural effusion noted.   3. Compared to the transthoracic echocardiogram performed on 9/23/2023 the pericardial effusion appears slightly larger on the current study.    ________________________________________________________________________________________  FINDINGS:     Pericardium:  Large pericardial effusion, measuring ~ 2.2 cm posterior to the left ventricle. Moderate pericardial effusion, measuring ~ 1.1 cm anterior to the right ventricle, ~ 1.7 cm adjacent to the RV free wall, ~ 1.4 cm lateral to the left ventricle, and ~ 1.3 cm around the LV apex. The right atrium and right ventricle appear partially compressed in the subcostal view. This may be consistent with early tamponade physiology. Unable to interpret respirophasic variability in the transmitral and transtricuspid spectral Doppler signals due to the presence of frequent premature supraventricular complexes.     Pleura:  Left pleural effusion noted.  ________________________________________________________________________________________  Electronically signed on 9/24/2023 at 11:09:28 AM by Chris Rivas M.D.         *** Final ***    < end of copied text >  < from: CT Angio Abdomen and Pelvis w/ IV Cont (09.24.23 @ 20:54) >  ACC: [id number] EXAM:  CT CHEST IC   ORDERED BY: SANDRA MENENDEZ     ACC: [id number] EXAM:  CT ANGIO ABD PELV (W)AW IC   ORDERED BY: UMM JACKSON     PROCEDURE DATE:  09/24/2023          INTERPRETATION:  CLINICAL INFORMATION: Rising lactate. Assess for bleed   and ischemia.    COMPARISON: CT abdomen pelvis 9/21/2023    CONTRAST/COMPLICATIONS:  IV Contrast: Omnipaque 350 90 cc administered   10 cc discarded  Oral Contrast: NONE  Complications: None reported at time of study completion    PROCEDURE:  CTAngiography of the Abdomen and Pelvis was performed.  Arterial and venous phases were acquired.  Sagittal and coronal reformats were performed as well as 3D (MIP)   reconstructions.    FINDINGS:    FINDINGS:  CHEST:  LUNGS AND LARGE AIRWAYS: Patent central airways. Left lower lobe collapse   and subsegmental atelectasis of lingula and right lower lobe. Aerated   portion of the bilateral lungs demonstrating groundglass opacities  PLEURA: Moderate bilateral pleural effusions, right worse than left.  VESSELS: Bilateral central lines terminating in the SVC/cavoatrial   junction.  HEART: Heart size is normal. Subxiphoid pericardial drain and interval   decrease of pericardial effusion compared to prior.  MEDIASTINUM AND ANJELICA: No lymphadenopathy.  CHEST WALL AND LOWER NECK: Within normal limits.    ABDOMEN/PELVIS:  LIVER: Within normal limits.  BILE DUCTS: Normal caliber.  GALLBLADDER: Cholelithiasis.  SPLEEN: Within normal limits.  PANCREAS: Within normal limits.  ADRENALS: Within normal limits.  KIDNEYS/URETERS: Within normal limits.    BLADDER: Mild distention with Singh catheter.  REPRODUCTIVE ORGANS: Uterus and adnexa within normal limits.    BOWEL: Mural thickening of the stomach, small bowel, and colon. No   abnormal hypo-/nonenhancement of wall of the GI tract. No pneumatosis or   pneumoperitoneum. Normal appendix No bowel obstruction.  PERITONEUM: Interval increase in small ascites.  VESSELS: Patent mesenteric vasculature, though mild stenosis of the FREDA.   Atherosclerotic changes. Similar dilatation of hepatic veins and IVC.  RETROPERITONEUM/LYMPH NODES: No lymphadenopathy.  ABDOMINAL WALL: Subcutaneous soft tissue edema. Soft tissue nodules in   the anterior abdomen likely injection sites.  BONES: Degenerative changes.    IMPRESSION:  1.  Mural thickening of the stomach, small bowel, and colon. Findings may   be secondary to fluid overload state versus enterocolitis.  2.  No evidence of mesenteric ischemia or acute GI bleed.  3.  Moderate bilateral pleural effusions, interval increase of small   ascites and new anasarca, suggestive of fluid overload state.  4.  Subxiphoid pericardial drain with interval decrease of pericardial   effusion.    --- End of Report ---          EFFIE STEWART MD; Resident Radiologist  Thisdocument has been electronically signed.  ELDA DALEY MD; Attending Radiologist  This document has been electronically signed. Sep 25 2023  9:38AM    < end of copied text >

## 2023-09-25 NOTE — PROGRESS NOTE ADULT - SUBJECTIVE AND OBJECTIVE BOX
[facility] Division of Kidney Diseases & Hypertension  FOLLOW UP NOTE  [phone]--------------------------------------------------------------------------------    Chief Complaint: ISAMAR on CKD, Fluid Overload    24 hour events/subjective: Pt. was seen and evaluated in the MICU this morning. Reports improvement in BL LE edema and dyspnea. Denies any headaches, fevers/chills, chest pain, and abdominal pain.    PAST HISTORY  --------------------------------------------------------------------------------  No significant changes to PMH, PSH, FHx, SHx, unless otherwise noted    ALLERGIES & MEDICATIONS  --------------------------------------------------------------------------------  Allergies    No Known Allergies    Intolerances    Standing Inpatient Medications  atorvastatin 40 milliGRAM(s) Oral at bedtime  chlorhexidine 2% Cloths 1 Application(s) Topical <User Schedule>  dextrose 5%. 1000 milliLiter(s) IV Continuous <Continuous>  dextrose 5%. 1000 milliLiter(s) IV Continuous <Continuous>  dextrose 50% Injectable 25 Gram(s) IV Push once  dextrose 50% Injectable 12.5 Gram(s) IV Push once  dextrose 50% Injectable 25 Gram(s) IV Push once  glucagon  Injectable 1 milliGRAM(s) IntraMuscular once  heparin   Injectable 5000 Unit(s) SubCutaneous every 8 hours  hydrocortisone sodium succinate Injectable 50 milliGRAM(s) IV Push every 6 hours  insulin lispro (ADMELOG) corrective regimen sliding scale   SubCutaneous at bedtime  insulin lispro (ADMELOG) corrective regimen sliding scale   SubCutaneous three times a day before meals  levothyroxine 125 MICROGram(s) Oral daily  norepinephrine Infusion 0.05 MICROgram(s)/kG/Min IV Continuous <Continuous>  pantoprazole    Tablet 40 milliGRAM(s) Oral before breakfast  piperacillin/tazobactam IVPB.. 3.375 Gram(s) IV Intermittent every 12 hours  polyethylene glycol 3350 17 Gram(s) Oral daily  senna 2 Tablet(s) Oral at bedtime  sodium bicarbonate 650 milliGRAM(s) Oral three times a day  vasopressin Infusion 0.04 Unit(s)/Min IV Continuous <Continuous>    PRN Inpatient Medications  dextrose Oral Gel 15 Gram(s) Oral once PRN  melatonin 3 milliGRAM(s) Oral at bedtime PRN    REVIEW OF SYSTEMS  --------------------------------------------------------------------------------  Gen: No fevers/chills,  Skin: No rash  Head/Eyes/Ears: No HA   Respiratory: +Dyspnea (improved)  CV: No chest pain  GI: No abdominal pain, diarrhea  : +Decreased UOP  MSK: +B/L LE edema (improving)  Heme: No easy bruising or bleeding  Psych: No significant depression    All other systems were reviewed and are negative, except as noted.    VITALS/PHYSICAL EXAM  --------------------------------------------------------------------------------  T(C): 35 (09-25-23 @ 08:00), Max: 36.1 (09-24-23 @ 16:00)  HR: 65 (09-25-23 @ 09:00) (52 - 130)  BP: --  RR: 21 (09-25-23 @ 09:00) (12 - 29)  SpO2: 95% (09-25-23 @ 09:00) (89% - 100%)  Wt(kg): --    09-24-23 @ 07:01  -  09-25-23 @ 07:00  --------------------------------------------------------  IN: 2940.2 mL / OUT: 505 mL / NET: 2435.2 mL    09-25-23 @ 07:01  -  09-25-23 @ 10:16  --------------------------------------------------------  IN: 5.6 mL / OUT: 20 mL / NET: -14.4 mL    Physical Exam:  Gen: resting, NAD   HEENT: Receiving supplemental oxygen via nasal cannula  Pulm: CTABL  CV: S1S2+, +Pericardial drain with bloody fluid noted  Abd: Soft, +BS   Ext: +B/L LE pitting edema (resolved)  Neuro: Awake and alert  Skin: Warm and dry  Vascular access: +Right IJ non-tunneled HD catheter    LABS/STUDIES  --------------------------------------------------------------------------------              6.3    18.49 >-----------<  141      [09-25-23 @ 06:30]              19.0     135  |  91  |  58  ----------------------------<  385      [09-25-23 @ 00:25]  4.0   |  14  |  2.91        Ca     7.7     [09-25-23 @ 00:25]      iCa    0.97     [09-25 @ 00:25]      Mg     2.20     [09-25-23 @ 00:25]      Phos  5.8     [09-25-23 @ 00:25]    TPro  5.6  /  Alb  3.1  /  TBili  1.3  /  DBili  x   /  AST  1074  /  ALT  589  /  AlkPhos  942  [09-25-23 @ 00:25]    PT/INR: PT 29.3 , INR 2.69       [09-25-23 @ 00:25]  PTT: 102.0      [09-25-23 @ 00:25]    CK 52      [09-24-23 @ 02:28]    Creatinine Trend:  SCr 2.91 [09-25 @ 00:25]  SCr 2.89 [09-24 @ 16:00]  SCr 2.58 [09-24 @ 02:28]  SCr 3.37 [09-23 @ 12:05]  SCr 3.31 [09-23 @ 01:10]    TSH 4.58      [09-22-23 @ 08:04]    HBsAb <3.0      [09-22-23 @ 17:55]  HBsAg Nonreact      [09-22-23 @ 17:55]  HCV 0.10, Nonreact      [09-22-23 @ 17:55] [facility] Division of Kidney Diseases & Hypertension  FOLLOW UP NOTE  [phone]--------------------------------------------------------------------------------    Chief Complaint: ISAMAR on CKD, Fluid Overload    24 hour events/subjective: Pt. with ISAMAR on CKD, initiated on HD on 9/22/23. Pt. was seen and evaluated in the MICU this morning. Reports improvement in B/L LE edema and dyspnea. Denies any headaches, fevers/chills, chest pain, and abdominal pain.    PAST HISTORY  --------------------------------------------------------------------------------  No significant changes to PMH, PSH, FHx, SHx, unless otherwise noted    ALLERGIES & MEDICATIONS  --------------------------------------------------------------------------------  Allergies    No Known Allergies    Intolerances    Standing Inpatient Medications  atorvastatin 40 milliGRAM(s) Oral at bedtime  chlorhexidine 2% Cloths 1 Application(s) Topical <User Schedule>  dextrose 5%. 1000 milliLiter(s) IV Continuous <Continuous>  dextrose 5%. 1000 milliLiter(s) IV Continuous <Continuous>  dextrose 50% Injectable 25 Gram(s) IV Push once  dextrose 50% Injectable 12.5 Gram(s) IV Push once  dextrose 50% Injectable 25 Gram(s) IV Push once  glucagon  Injectable 1 milliGRAM(s) IntraMuscular once  heparin   Injectable 5000 Unit(s) SubCutaneous every 8 hours  hydrocortisone sodium succinate Injectable 50 milliGRAM(s) IV Push every 6 hours  insulin lispro (ADMELOG) corrective regimen sliding scale   SubCutaneous at bedtime  insulin lispro (ADMELOG) corrective regimen sliding scale   SubCutaneous three times a day before meals  levothyroxine 125 MICROGram(s) Oral daily  norepinephrine Infusion 0.05 MICROgram(s)/kG/Min IV Continuous <Continuous>  pantoprazole    Tablet 40 milliGRAM(s) Oral before breakfast  piperacillin/tazobactam IVPB.. 3.375 Gram(s) IV Intermittent every 12 hours  polyethylene glycol 3350 17 Gram(s) Oral daily  senna 2 Tablet(s) Oral at bedtime  sodium bicarbonate 650 milliGRAM(s) Oral three times a day  vasopressin Infusion 0.04 Unit(s)/Min IV Continuous <Continuous>    PRN Inpatient Medications  dextrose Oral Gel 15 Gram(s) Oral once PRN  melatonin 3 milliGRAM(s) Oral at bedtime PRN    REVIEW OF SYSTEMS  --------------------------------------------------------------------------------  Gen: No fever  Skin: No rash  Head/Eyes/Ears: No HA   Respiratory: +Decreased dyspnea  CV: No chest pain  GI: No abdominal pain, diarrhea  : +Decreased UOP  MSK: +Decreased B/L LE edema  Heme: No easy bruising or bleeding  Psych: No significant depression    All other systems were reviewed and are negative, except as noted.    VITALS/PHYSICAL EXAM  --------------------------------------------------------------------------------  T(C): 35 (09-25-23 @ 08:00), Max: 36.1 (09-24-23 @ 16:00)  HR: 65 (09-25-23 @ 09:00) (52 - 130)  BP: --  RR: 21 (09-25-23 @ 09:00) (12 - 29)  SpO2: 95% (09-25-23 @ 09:00) (89% - 100%)  Wt(kg): --    09-24-23 @ 07:01  -  09-25-23 @ 07:00  --------------------------------------------------------  IN: 2940.2 mL / OUT: 505 mL / NET: 2435.2 mL    09-25-23 @ 07:01  -  09-25-23 @ 10:16  --------------------------------------------------------  IN: 5.6 mL / OUT: 20 mL / NET: -14.4 mL    Physical Exam:  Gen: resting, NAD   HEENT: Receiving supplemental oxygen via nasal cannula  Pulm: CTA B/L  CV: S1S2+, +pericardial drain with bloody fluid noted  Abd: Soft, +BS   Ext: No LE edema   Neuro: Awake and alert  Skin: Warm and dry  Vascular access: +Right IJ non-tunneled HD catheter    LABS/STUDIES  --------------------------------------------------------------------------------              6.3    18.49 >-----------<  141      [09-25-23 @ 06:30]              19.0     135  |  91  |  58  ----------------------------<  385      [09-25-23 @ 00:25]  4.0   |  14  |  2.91        Ca     7.7     [09-25-23 @ 00:25]      iCa    0.97     [09-25 @ 00:25]      Mg     2.20     [09-25-23 @ 00:25]      Phos  5.8     [09-25-23 @ 00:25]    TPro  5.6  /  Alb  3.1  /  TBili  1.3  /  DBili  x   /  AST  1074  /  ALT  589  /  AlkPhos  942  [09-25-23 @ 00:25]    CK 52      [09-24-23 @ 02:28]    Creatinine Trend:  SCr 2.91 [09-25 @ 00:25]  SCr 2.89 [09-24 @ 16:00]  SCr 2.58 [09-24 @ 02:28]  SCr 3.37 [09-23 @ 12:05]  SCr 3.31 [09-23 @ 01:10]    TSH 4.58      [09-22-23 @ 08:04]    HBsAb <3.0      [09-22-23 @ 17:55]  HBsAg Nonreact      [09-22-23 @ 17:55]  HCV 0.10, Nonreact      [09-22-23 @ 17:55]

## 2023-09-25 NOTE — PROGRESS NOTE ADULT - PROBLEM SELECTOR PLAN 1
Levo 0.05mcg/kg/min  Vaso 0.04u/min   Strict I/O  Daily standing weights  Monitor lytes replete K>4.0 and Mg>2.0   Trend SCr  Please order thyroid function in AM  Management per primary team  Appreciate Nephrology recommendations  Pending final recommendations from HF attending. Levo 0.05 mcg/kg/min  Vaso 0.04 units/min   Strict I/O  Daily standing weights  Monitor lytes replete K>4.0 and Mg>2.0   Trend SCr  Management per primary team  Appreciate Nephrology recommendations (HD -1L)  Pending final recommendations from HF attending.

## 2023-09-25 NOTE — PROGRESS NOTE ADULT - ATTENDING COMMENTS
Pt. with oliguric ISAMAR on CKD, initiated on HD on 9/22/23. Assessment and plan for ISAMAR on CKD and metabolic acidosis as outlined above. Plan for HD treatment today. Monitor labs and urine output. Avoid any potential nephrotoxins. Dose medications as per eGFR.

## 2023-09-25 NOTE — CHART NOTE - NSCHARTNOTEFT_GEN_A_CORE
: Junior Infante, PGY-1    INDICATION: Critical MS    PROCEDURE:  [X] LIMITED ECHO  [X] LIMITED CHEST  [ ] LIMITED RETROPERITONEAL  [ ] LIMITED ABDOMINAL  [ ] LIMITED DVT  [ ] NEEDLE GUIDANCE VASCULAR  [ ] NEEDLE GUIDANCE THORACENTESIS  [ ] NEEDLE GUIDANCE PARACENTESIS  [ ] NEEDLE GUIDANCE PERICARDIOCENTESIS  [ ] OTHER    FINDINGS: There A-lines predominant at the lung apex and bilateral simple pleural effusions at the lung bases. The heart is off axis and displaced to the right apex. Bilateral femoral and popliteal venous compression noted bilaterally.     INTERPRETATION: Bilateral pleural effusions at the lung bases with an off-axis heart and low suspicion of DVT. : Junior Infante, PGY-1    INDICATION: Critical MS    PROCEDURE:  [X] LIMITED ECHO  [X] LIMITED CHEST  [ ] LIMITED RETROPERITONEAL  [ ] LIMITED ABDOMINAL  [ ] LIMITED DVT  [ ] NEEDLE GUIDANCE VASCULAR  [ ] NEEDLE GUIDANCE THORACENTESIS  [ ] NEEDLE GUIDANCE PARACENTESIS  [ ] NEEDLE GUIDANCE PERICARDIOCENTESIS  [ ] OTHER    FINDINGS: There A-lines predominant at the lung apex and bilateral simple pleural effusions at the lung bases left>right. The heart is off axis and displaced to the right apex with a trace circumferential pericardial effusion. Bilateral femoral and popliteal venous compression noted bilaterally.     INTERPRETATION: Bilateral pleural effusions at the lung bases left>right with an off-axis heart, trace pericardial effusion and low suspicion of DVT.

## 2023-09-25 NOTE — PROGRESS NOTE ADULT - ASSESSMENT
ASSESSMENT  Ms. Hernandez is a 57 yo F w/ HTN, HLD, DM2, CKD (stage 3-4), hypothyroidism (c/b myxedema coma in past), severe pulmonary hypertension, mod-severe TR, asthma, HFpEF, presents for progressive dyspnea at rest with left sided chest tightness, found to be in ADHF, admitted to MICU for urgent HD due to acidosis and volume OL, anuric on bumex drip    PLAN  =====Neurologic=====  No active issues    =====Pulmonary=====  #Dyspnea   Plan: Acute on chronic right sided CHF vs progressive renal failure vs PE (will need to r/o with PE, but given kidney dysfunction will do dopplers of LE first to r/o DVT given high risk of ESRD with contrast).   -STAT duplex of lower extremity reordered to r/o DVT  -troponin 65->64  -pro-BNP 2092   -RVP negative   Most recent TTE (8/6) showing HFpEF with elevated RVSP and RV dilation.   -TTE ordered   - escalated to bumex drip, poor UOP  - Currently on 3L NC, kai    =====Cardiovascular=====  #Cardiogenic shock  - i/s/o pericardial effusion   - started levo, kai  - gave 1L IVF  - consulted cards, urgent TTE performed, f/u read + recs    #Acute on chronic diastolic congestive heart failure  - Last EF 8/2023 69%  - Bumex gtt 2mg/hr->Urgent HD for volume removal due to anuria  - Strict I/O  - Daily standing weights  - Monitor lytes replete K>4.0 and Mg>2.0   - Trend SCr  - HF following, appreciate recommendations    #HTN  - Hydralazine. Isosorbide dinitrate, Nifedipine    =====GI=====  #GERD  - Protonix 40mg IV QD    #Nausea   - i/s/o kidney injury, rising lactate  - zofran PRN    #Diet  - CC diet w/ volume restriction    =====Renal/=====  #Electrolytes  - Maintain K>4, Phos>3, Mag>2, iCal>1    #Hyponatremia  - Pt. with hypervolemia hyponatremia.   - Last SNa low at 121.   - Pt. initiated on IV Bumex infusion for hypervolemia management->Urgent HD  - fluid restriction (<1 L/day).   - Monitor SNa q8 hrs.   - Avoid overcorrection of Na. (4-6/ 24hr)    #Acute renal failure superimposed on chronic kidney disease.   - Pt with CKD 3-4, baseline cr approximately 2, now with ISAMAR on CKD  - Monitor renal function/UOP, avoid nephrotoxins  - Sodium Bicarb Tabs  - urgent HD today per nephro for volume overload and anuria,     #Metabolic acidosis  Pt. with metabolic acidosis in the setting of volume overload and renal failure. pH is low at 7.19, and SCO2 decreased to 14 today.   - iHD per nephro, as outlined above.   - Monitor SCO2    =====Endocrine=====  #DM2  - FSBG and ADRIAN     #Hypothyroidism  - Synthroid 125 mcg daily    =====Infectious Disease=====  Pt hypothermic on oral and rectal temp (unable to obtain temp on thermometer)  - f/u blood cultures, UA  - NGTD    =====Heme/Onc=====  #DVT Ppx  - heparin 5000 subQ    #Anemia  - Hgb 7-8, not clinically bleeding. Likely in s/o renal disease.   - CTM, maintain Hgb >7    =====Ethics=====  FULL CODE   ASSESSMENT  Ms. Hernandez is a 57 yo F w/ HTN, HLD, DM2, CKD (stage 3-4), hypothyroidism (c/b myxedema coma in past), severe pulmonary hypertension, mod-severe TR, asthma, HFpEF, presents for progressive dyspnea at rest with left sided chest tightness, found to be in ADHF, admitted to MICU for urgent HD due to acidosis and volume OL, anuric on bumex drip    PLAN  =====Neurologic=====  No active issues    =====Pulmonary=====  #Dyspnea   Plan: likely d/t Acute on chronic right sided CHF vs progressive renal failure. Less likely d/t PE. However, will need to r/o with PE, but given kidney dysfunction will do dopplers of LE first to r/o DVT given high risk of ESRD with contrast    -f/u duplex of lower extremity ordered to r/o DVT  -troponin 65->64  -pro-BNP 2092   -RVP negative   -TTE showing HFpEF with elevated RVSP and RV dilation.   - escalated to bumex drip, poor UOP  - Currently on 4L NC    =====Cardiovascular=====  #Cardiogenic shock  - i/s/o pericardial effusion   - lactate improved today 3.7 <-- 11.6  - c/w levo with hold parameters  - CT a/p 09/24 showing no evidence of bleed  - s/p 1L IVF  - TTE 09/24 showing evidence of RA and RV partial compression consistent with possible tamponade physiology  - f/u cardiology recommendations. repeat TTE 09/25 ordered     #Acute on chronic diastolic congestive heart failure  - Last EF 8/2023 69%  - Bumex gtt 2mg/hr->Urgent HD for volume removal due to anuria  - Strict I/O  - Daily standing weights  - Monitor lytes replete K>4.0 and Mg>2.0   - Trend SCr  - HF following, appreciate recommendations    #HTN  - Hydralazine. Isosorbide dinitrate, Nifedipine    =====GI=====  #GERD  - Protonix 40mg IV QD    #Nausea   - i/s/o kidney injury, rising lactate  - zofran PRN    #Diet  - CC diet w/ volume restriction    =====Renal/=====  #Electrolytes  - Maintain K>4, Phos>3, Mag>2, iCal>1    #Hyponatremia  - Pt. with hypervolemia hyponatremia, today improved to 135  - Pt. initiated on IV Bumex infusion for hypervolemia management->Urgent HD  - fluid restriction (<1 L/day).   - Monitor SNa q8 hrs.   - Avoid overcorrection of Na. (4-6/ 24hr)    #Acute renal failure superimposed on chronic kidney disease.   - Pt with CKD 3-4, baseline cr approximately 2, now with ISAMAR on CKD  - Monitor renal function/UOP, avoid nephrotoxins  - Sodium Bicarb Tabs  - plan for HD today for volume overload and anuria,     #Metabolic acidosis  Pt. with metabolic acidosis in the setting of volume overload and renal failure. improving pH 7.34 <-- 7.18 <-- 7.19   - iHD per nephro, as outlined above.   - Monitor SCO2    =====Endocrine=====  #DM2  - FSBG   - MISS <-- ADRIAN given elevated blood glucose.     #Hypothyroidism  - Synthroid 125 mcg daily    =====Infectious Disease=====  Pt hypothermic on oral and rectal temp (unable to obtain temp on thermometer)  - f/u blood cultures, UA  - NGTD    =====Heme/Onc=====  #DVT Ppx  - heparin 5000 subQ    #Anemia  - Hgb 6.9 this AM, transfuse to goal during HD today  - Hgb 7-8, not clinically bleeding. Likely in s/o renal disease.   - CTM, maintain Hgb >7    =====Ethics=====  FULL CODE   ASSESSMENT  Ms. Hernandez is a 57 yo F w/ HTN, HLD, DM2, CKD (stage 3-4), hypothyroidism (c/b myxedema coma in past), severe pulmonary hypertension, mod-severe TR, asthma, HFpEF, presents for progressive dyspnea at rest with left sided chest tightness, found to be in ADHF, admitted to MICU for urgent HD due to acidosis and volume OL, anuric on MWF HD.   PLAN  =====Neurologic=====  No active issues    =====Pulmonary=====  #Dyspnea   Plan: likely d/t Acute on chronic right sided CHF vs progressive renal failure. Less likely d/t PE. However, will need to r/o with PE, but given kidney dysfunction will do dopplers of LE first to r/o DVT given high risk of ESRD with contrast    - Neg duplex US  of lower extremity on 9/22  -troponin 65->64  - pro-BNP 2092 --> 1819r  - RVP negative   -TTE showing HFpEF with elevated RVSP and RV dilation.   - Currently on 4L NC    =====Cardiovascular=====  #Cardiogenic shock  - i/s/o pericardial effusion s/p drain  - 9/25 POCUS f/u  --> Bilateral femoral and popliteal venous compression noted bilaterally. INTERPRETATION: Bilateral pleural effusions at the lung bases left>right with an off-axis heart, trace pericardial effusion and low suspicion of DVT  - lactate improved today 1.4<--3.7 <-- 11.6   - Off levo during HD, will continue if needed with hold parameters  - CT a/p 09/24 showing no evidence of bleed  - TTE 09/24 showing evidence of RA and RV partial compression consistent with possible tamponade physiology  - F/u repeat TTE 09/25 ordered; appreciate cardiology recs    #Acute on chronic diastolic congestive heart failure  - Last EF 8/2023 69%  - C/w HD as per nephro; last session Monday, 9/25   - Strict I/O & Daily standing weights  - Monitor lytes replete K>4.0 and Mg>2.0  - Trend SCr 2.91 on 9/25  - Remains oliguric   - HF following, appreciate recommendations    #HTN  - Dc'd Hydralazine. Isosorbide dinitrate, Nifedipine on 9/24  - BPs WNL over last 24h    =====GI=====  #GERD  - Protonix 40mg IV QD  #DIET CC w/ evening snack    #Nausea   - i/s/o kidney injury, rising lactate  - zofran PRN    #Diet  - CC diet w/ volume restriction    =====Renal/=====  #Electrolytes  - Maintain K>4, Phos>3, Mag>2, iCal>1    #Hyponatremia  - Pt. with hypervolemia hyponatremia, today improved to 135  - Pt. initiated on IV Bumex infusion for hypervolemia management->Urgent HD  - fluid restriction (<1 L/day).   - Monitor SNa q8 hrs.   - Avoid overcorrection of Na. (4-6/ 24hr)     #Acute renal failure superimposed on chronic kidney disease.   - Pt with CKD 3-4, baseline cr approximately 2, now with ISAMAR on CKD  - Monitor renal function/UOP, avoid nephrotoxins  - Sodium Bicarb Tabs  - S/p HD 9/25 for volume overload and oliguria,     #Metabolic acidosis  Pt. with metabolic acidosis in the setting of volume overload and renal failure. improving pH 7.34 <-- 7.18 <-- 7.19   - iHD per nephro, as outlined above.   - Monitor SCO2    =====Endocrine=====  #DM2  - FSBG   - MISS <-- ADRIAN given elevated blood glucose.   - received 6 lantus 9/25 AM and will reevaluated basal/sliding scale needs 9/26 AM    #Hypothyroidism  - TSH 4.58; C/w 125 synthroid daily    =====Infectious Disease=====  Pt hypothermic on oral and rectal temp (unable to obtain temp on thermometer)  - Temps 95-97 range  - Urine and Blood Cx NGTD    =====Heme/Onc=====  #DVT Ppx  - heparin 5000 subQ    #Anemia  - Hgb 7-8, not clinically bleeding. Likely in s/o renal disease.   - CTM, maintain Hgb >7 (6.9 -->8.2 on 9/25 s/p HD and 1 unit pRBC)    =====Ethics=====  FULL CODE

## 2023-09-25 NOTE — PROGRESS NOTE ADULT - PROBLEM SELECTOR PLAN 1
Pt. with oliguric ISAMAR on CKD in setting of HF and fluid overload. On review of labs on [place], Scr elevated at 3.31 on 8/30/23, increased to 3.92 on admission (9/20). Pt. initiated on IV Bumex infusion on 9/21. However, pt. remained anuric despite increase in IV Bumex infusion rate. Scr increased to 3.89 on 9/22. Pt. underwent Right IJ non-tunneled HD catheter placement and received 1st HD treatment on 9/22. Last HD treatment was on 9/23. Pt. clinically stable. Labs reviewed. Pt. remains anuric. Plan for HD treatment today. Monitor labs and urine output. Avoid any potential nephrotoxins. Dose medications as per eGFR. Pt. with oliguric ISAMAR on CKD in setting of HF and fluid overload. On review of labs on [place], Scr elevated at 3.31 on 8/30/23, increased to 3.92 on admission (9/20). Pt. initiated on IV Bumex infusion on 9/21. However, pt. remained anuric despite increase in IV Bumex infusion rate. Scr increased to 3.89 on 9/22. Pt. underwent right IJ non-tunneled HD catheter placement and received 1st HD treatment on 9/22. Last HD treatment was on 9/23. Pt. clinically stable. Labs reviewed. Pt. remains oliguric. Plan for HD treatment today. Monitor labs and urine output. Avoid any potential nephrotoxins. Dose medications as per eGFR.

## 2023-09-25 NOTE — PROGRESS NOTE ADULT - ASSESSMENT
58 year old Female with PMH of  HTN, HLD, DM2, asthma, CKD (baseline SCr ~2.0), hypothyroidism c/b myxedema coma in past), anemia requiring recent transfusions (followed by GI with plan for scope), and HFpEF with severe pulmonary hypertension (EF 64%;LVIDd 4.6 RVE with normal RV function, mod-severe TR and severe PH RVSP~65). Patient presented with c/o progressive worsening SOB/MARKS/PND and 3 pillow orthopnea X 5days; treated in the ED with IV Lasix 40mg followed by IV Bumex 2mg BID.      9/22- Patient with worsening SCr and signs of uremia requiring emergency HD and transfer to MICU. Subsequently, she became hypotensive requiring pressor support.     Pertinent Labs: BNP  1819     Lactate 1.0    Troponin levels    64/65    BUN/Cr  110/3.92   Na 122    TSH: T3/T4  Pending             K/L  (1/2023)  8.9/3.75 ratio 2.35  CXR: Pulmonary edema with small bilateral pleural effusions  EKG:  Normal sinus rhythm, possible Left atrial enlargement  TTE: Normal LV systolic function, Right ventricular enlargement with normal RV systolic function, Moderate-severe TR and PASP equals 65 consistent with PH.  RHC (8/23):  RA 17 PA 51/22/36  PCWP  25  CO/CI 6.7/4.2     and  PVR 2.6 POWERS

## 2023-09-26 ENCOUNTER — APPOINTMENT (OUTPATIENT)
Dept: ENDOCRINOLOGY | Facility: CLINIC | Age: 58
End: 2023-09-26

## 2023-09-26 LAB
-  BLOOD PCR PANEL: SIGNIFICANT CHANGE UP
ALBUMIN SERPL ELPH-MCNC: 3.2 G/DL — LOW (ref 3.3–5)
ALP SERPL-CCNC: 799 U/L — HIGH (ref 40–120)
ALT FLD-CCNC: 585 U/L — HIGH (ref 4–33)
ANION GAP SERPL CALC-SCNC: 12 MMOL/L — SIGNIFICANT CHANGE UP (ref 7–14)
APTT BLD: 159.7 SEC — CRITICAL HIGH (ref 24.5–35.6)
APTT BLD: 70.6 SEC — HIGH (ref 24.5–35.6)
AST SERPL-CCNC: 922 U/L — HIGH (ref 4–32)
BASOPHILS # BLD AUTO: 0.02 K/UL — SIGNIFICANT CHANGE UP (ref 0–0.2)
BASOPHILS # BLD AUTO: 0.02 K/UL — SIGNIFICANT CHANGE UP (ref 0–0.2)
BASOPHILS NFR BLD AUTO: 0.1 % — SIGNIFICANT CHANGE UP (ref 0–2)
BASOPHILS NFR BLD AUTO: 0.2 % — SIGNIFICANT CHANGE UP (ref 0–2)
BILIRUB SERPL-MCNC: 1.1 MG/DL — SIGNIFICANT CHANGE UP (ref 0.2–1.2)
BLD GP AB SCN SERPL QL: NEGATIVE — SIGNIFICANT CHANGE UP
BLOOD GAS VENOUS COMPREHENSIVE RESULT: SIGNIFICANT CHANGE UP
BUN SERPL-MCNC: 33 MG/DL — HIGH (ref 7–23)
CA-I BLD-SCNC: 1.06 MMOL/L — LOW (ref 1.15–1.29)
CALCIUM SERPL-MCNC: 8.1 MG/DL — LOW (ref 8.4–10.5)
CHLORIDE SERPL-SCNC: 97 MMOL/L — LOW (ref 98–107)
CO2 SERPL-SCNC: 31 MMOL/L — SIGNIFICANT CHANGE UP (ref 22–31)
CREAT SERPL-MCNC: 2.1 MG/DL — HIGH (ref 0.5–1.3)
EGFR: 27 ML/MIN/1.73M2 — LOW
EOSINOPHIL # BLD AUTO: 0.02 K/UL — SIGNIFICANT CHANGE UP (ref 0–0.5)
EOSINOPHIL # BLD AUTO: 0.08 K/UL — SIGNIFICANT CHANGE UP (ref 0–0.5)
EOSINOPHIL NFR BLD AUTO: 0.1 % — SIGNIFICANT CHANGE UP (ref 0–6)
EOSINOPHIL NFR BLD AUTO: 0.6 % — SIGNIFICANT CHANGE UP (ref 0–6)
FIBRINOGEN PPP-MCNC: 277 MG/DL — SIGNIFICANT CHANGE UP (ref 200–465)
GLUCOSE BLDC GLUCOMTR-MCNC: 127 MG/DL — HIGH (ref 70–99)
GLUCOSE BLDC GLUCOMTR-MCNC: 150 MG/DL — HIGH (ref 70–99)
GLUCOSE BLDC GLUCOMTR-MCNC: 56 MG/DL — LOW (ref 70–99)
GLUCOSE BLDC GLUCOMTR-MCNC: 58 MG/DL — LOW (ref 70–99)
GLUCOSE BLDC GLUCOMTR-MCNC: 81 MG/DL — SIGNIFICANT CHANGE UP (ref 70–99)
GLUCOSE BLDC GLUCOMTR-MCNC: 88 MG/DL — SIGNIFICANT CHANGE UP (ref 70–99)
GLUCOSE BLDC GLUCOMTR-MCNC: 97 MG/DL — SIGNIFICANT CHANGE UP (ref 70–99)
GLUCOSE SERPL-MCNC: 85 MG/DL — SIGNIFICANT CHANGE UP (ref 70–99)
GRAM STN FLD: SIGNIFICANT CHANGE UP
HCT VFR BLD CALC: 22.6 % — LOW (ref 34.5–45)
HCT VFR BLD CALC: 23 % — LOW (ref 34.5–45)
HGB BLD-MCNC: 7.5 G/DL — LOW (ref 11.5–15.5)
HGB BLD-MCNC: 7.7 G/DL — LOW (ref 11.5–15.5)
IANC: 11.53 K/UL — HIGH (ref 1.8–7.4)
IANC: 15.51 K/UL — HIGH (ref 1.8–7.4)
IMM GRANULOCYTES NFR BLD AUTO: 0.9 % — SIGNIFICANT CHANGE UP (ref 0–0.9)
IMM GRANULOCYTES NFR BLD AUTO: 1.3 % — HIGH (ref 0–0.9)
INR BLD: 1.56 RATIO — HIGH (ref 0.85–1.18)
INR BLD: 2.43 RATIO — HIGH (ref 0.85–1.18)
LYMPHOCYTES # BLD AUTO: 0.53 K/UL — LOW (ref 1–3.3)
LYMPHOCYTES # BLD AUTO: 0.77 K/UL — LOW (ref 1–3.3)
LYMPHOCYTES # BLD AUTO: 3.1 % — LOW (ref 13–44)
LYMPHOCYTES # BLD AUTO: 5.9 % — LOW (ref 13–44)
MAGNESIUM SERPL-MCNC: 2.1 MG/DL — SIGNIFICANT CHANGE UP (ref 1.6–2.6)
MCHC RBC-ENTMCNC: 26.1 PG — LOW (ref 27–34)
MCHC RBC-ENTMCNC: 26.5 PG — LOW (ref 27–34)
MCHC RBC-ENTMCNC: 32.6 GM/DL — SIGNIFICANT CHANGE UP (ref 32–36)
MCHC RBC-ENTMCNC: 34.1 GM/DL — SIGNIFICANT CHANGE UP (ref 32–36)
MCV RBC AUTO: 77.7 FL — LOW (ref 80–100)
MCV RBC AUTO: 80.1 FL — SIGNIFICANT CHANGE UP (ref 80–100)
METHOD TYPE: SIGNIFICANT CHANGE UP
MONOCYTES # BLD AUTO: 0.52 K/UL — SIGNIFICANT CHANGE UP (ref 0–0.9)
MONOCYTES # BLD AUTO: 0.6 K/UL — SIGNIFICANT CHANGE UP (ref 0–0.9)
MONOCYTES NFR BLD AUTO: 3.6 % — SIGNIFICANT CHANGE UP (ref 2–14)
MONOCYTES NFR BLD AUTO: 4 % — SIGNIFICANT CHANGE UP (ref 2–14)
NEUTROPHILS # BLD AUTO: 11.53 K/UL — HIGH (ref 1.8–7.4)
NEUTROPHILS # BLD AUTO: 15.51 K/UL — HIGH (ref 1.8–7.4)
NEUTROPHILS NFR BLD AUTO: 88 % — HIGH (ref 43–77)
NEUTROPHILS NFR BLD AUTO: 92.2 % — HIGH (ref 43–77)
NRBC # BLD: 2 /100 WBCS — HIGH (ref 0–0)
NRBC # BLD: 2 /100 WBCS — HIGH (ref 0–0)
NRBC # FLD: 0.26 K/UL — HIGH (ref 0–0)
NRBC # FLD: 0.27 K/UL — HIGH (ref 0–0)
PHOSPHATE SERPL-MCNC: 3.1 MG/DL — SIGNIFICANT CHANGE UP (ref 2.5–4.5)
PLATELET # BLD AUTO: 106 K/UL — LOW (ref 150–400)
PLATELET # BLD AUTO: 89 K/UL — LOW (ref 150–400)
POTASSIUM SERPL-MCNC: 4 MMOL/L — SIGNIFICANT CHANGE UP (ref 3.5–5.3)
POTASSIUM SERPL-SCNC: 4 MMOL/L — SIGNIFICANT CHANGE UP (ref 3.5–5.3)
PROT SERPL-MCNC: 5.9 G/DL — LOW (ref 6–8.3)
PROTHROM AB SERPL-ACNC: 17.2 SEC — HIGH (ref 9.5–13)
PROTHROM AB SERPL-ACNC: 26.8 SEC — HIGH (ref 9.5–13)
RBC # BLD: 2.87 M/UL — LOW (ref 3.8–5.2)
RBC # BLD: 2.91 M/UL — LOW (ref 3.8–5.2)
RBC # FLD: 14.7 % — HIGH (ref 10.3–14.5)
RBC # FLD: 15.2 % — HIGH (ref 10.3–14.5)
RH IG SCN BLD-IMP: POSITIVE — SIGNIFICANT CHANGE UP
SODIUM SERPL-SCNC: 140 MMOL/L — SIGNIFICANT CHANGE UP (ref 135–145)
WBC # BLD: 13.09 K/UL — HIGH (ref 3.8–10.5)
WBC # BLD: 16.83 K/UL — HIGH (ref 3.8–10.5)
WBC # FLD AUTO: 13.09 K/UL — HIGH (ref 3.8–10.5)
WBC # FLD AUTO: 16.83 K/UL — HIGH (ref 3.8–10.5)

## 2023-09-26 PROCEDURE — 99291 CRITICAL CARE FIRST HOUR: CPT | Mod: GC

## 2023-09-26 PROCEDURE — 99233 SBSQ HOSP IP/OBS HIGH 50: CPT | Mod: GC

## 2023-09-26 RX ORDER — PHYTONADIONE (VIT K1) 5 MG
5 TABLET ORAL ONCE
Refills: 0 | Status: DISCONTINUED | OUTPATIENT
Start: 2023-09-26 | End: 2023-09-26

## 2023-09-26 RX ORDER — HYDROCORTISONE 20 MG
50 TABLET ORAL EVERY 12 HOURS
Refills: 0 | Status: COMPLETED | OUTPATIENT
Start: 2023-09-26 | End: 2023-09-26

## 2023-09-26 RX ORDER — DEXTROSE 50 % IN WATER 50 %
25 SYRINGE (ML) INTRAVENOUS ONCE
Refills: 0 | Status: COMPLETED | OUTPATIENT
Start: 2023-09-26 | End: 2023-09-26

## 2023-09-26 RX ORDER — HYDROCORTISONE 20 MG
50 TABLET ORAL ONCE
Refills: 0 | Status: DISCONTINUED | OUTPATIENT
Start: 2023-09-27 | End: 2023-09-27

## 2023-09-26 RX ADMIN — Medication 125 MICROGRAM(S): at 05:00

## 2023-09-26 RX ADMIN — Medication 25 MILLILITER(S): at 03:15

## 2023-09-26 RX ADMIN — PANTOPRAZOLE SODIUM 40 MILLIGRAM(S): 20 TABLET, DELAYED RELEASE ORAL at 06:53

## 2023-09-26 RX ADMIN — Medication 650 MILLIGRAM(S): at 21:14

## 2023-09-26 RX ADMIN — Medication 50 MILLIGRAM(S): at 17:31

## 2023-09-26 RX ADMIN — Medication 0: at 21:15

## 2023-09-26 RX ADMIN — CHLORHEXIDINE GLUCONATE 1 APPLICATION(S): 213 SOLUTION TOPICAL at 05:55

## 2023-09-26 RX ADMIN — PIPERACILLIN AND TAZOBACTAM 25 GRAM(S): 4; .5 INJECTION, POWDER, LYOPHILIZED, FOR SOLUTION INTRAVENOUS at 17:31

## 2023-09-26 RX ADMIN — Medication 650 MILLIGRAM(S): at 06:53

## 2023-09-26 RX ADMIN — Medication 50 MILLIGRAM(S): at 05:55

## 2023-09-26 RX ADMIN — PIPERACILLIN AND TAZOBACTAM 25 GRAM(S): 4; .5 INJECTION, POWDER, LYOPHILIZED, FOR SOLUTION INTRAVENOUS at 05:55

## 2023-09-26 NOTE — PROGRESS NOTE ADULT - SUBJECTIVE AND OBJECTIVE BOX
INTERVAL HPI/OVERNIGHT EVENTS:    SUBJECTIVE: Patient seen and examined at bedside.       VITAL SIGNS:  ICU Vital Signs Last 24 Hrs  T(C): 36.6 (26 Sep 2023 04:00), Max: 36.8 (26 Sep 2023 00:00)  T(F): 97.8 (26 Sep 2023 04:00), Max: 98.3 (26 Sep 2023 00:00)  HR: 66 (26 Sep 2023 06:26) (63 - 71)  BP: 156/72 (26 Sep 2023 06:00) (102/71 - 156/72)  BP(mean): 99 (26 Sep 2023 06:00) (71 - 99)  ABP: 135/66 (25 Sep 2023 23:00) (106/52 - 145/69)  ABP(mean): 93 (25 Sep 2023 23:00) (71 - 101)  RR: 13 (26 Sep 2023 06:00) (9 - 29)  SpO2: 96% (26 Sep 2023 06:26) (93% - 100%)    O2 Parameters below as of 26 Sep 2023 06:00  Patient On (Oxygen Delivery Method): nasal cannula w/ humidification  O2 Flow (L/min): 2          Plateau pressure:   P/F ratio:     09-25 @ 07:01  -  09-26 @ 07:00  --------------------------------------------------------  IN: 1104.6 mL / OUT: 1650 mL / NET: -545.4 mL      CAPILLARY BLOOD GLUCOSE      POCT Blood Glucose.: 127 mg/dL (26 Sep 2023 03:40)    ECG:    PHYSICAL EXAM:  VITALS:   T(C): 36.6 (09-26-23 @ 04:00), Max: 36.8 (09-26-23 @ 00:00)  HR: 66 (09-26-23 @ 06:26) (63 - 71)  BP: 156/72 (09-26-23 @ 06:00) (102/71 - 156/72)  RR: 13 (09-26-23 @ 06:00) (9 - 29)  SpO2: 96% (09-26-23 @ 06:26) (93% - 100%)    GENERAL: Laying comfortably, NAD  EYES: EOMI, PERRL, no scleral icterus  NECK: No JVD  LUNG: Clear to auscultation bilaterally; No wheeze, crackles or rhonci  HEART: Regular rate and rhythm; No murmurs, rubs, or gallops  ABDOMEN: Soft, Nontender, Nondistended  EXTREMITIES:  No LE edema, 2+ Peripheral Pulses, No clubbing, cyanosis, or edema  PSYCH: AAOx3  NEUROLOGY: non-focal, strength 5/5 in all extremities, sensation intact  SKIN: No rashes or lesions      MEDICATIONS:  MEDICATIONS  (STANDING):  chlorhexidine 2% Cloths 1 Application(s) Topical <User Schedule>  dextrose 5%. 1000 milliLiter(s) (100 mL/Hr) IV Continuous <Continuous>  dextrose 5%. 1000 milliLiter(s) (50 mL/Hr) IV Continuous <Continuous>  dextrose 50% Injectable 25 Gram(s) IV Push once  dextrose 50% Injectable 25 Gram(s) IV Push once  dextrose 50% Injectable 12.5 Gram(s) IV Push once  glucagon  Injectable 1 milliGRAM(s) IntraMuscular once  heparin   Injectable 5000 Unit(s) SubCutaneous every 8 hours  hydrocortisone sodium succinate Injectable 50 milliGRAM(s) IV Push every 6 hours  insulin lispro (ADMELOG) corrective regimen sliding scale   SubCutaneous at bedtime  insulin lispro (ADMELOG) corrective regimen sliding scale   SubCutaneous three times a day before meals  insulin lispro Injectable (ADMELOG) 2 Unit(s) SubCutaneous three times a day before meals  levothyroxine 125 MICROGram(s) Oral daily  pantoprazole    Tablet 40 milliGRAM(s) Oral before breakfast  piperacillin/tazobactam IVPB.. 3.375 Gram(s) IV Intermittent every 12 hours  polyethylene glycol 3350 17 Gram(s) Oral daily  senna 2 Tablet(s) Oral at bedtime  sodium bicarbonate 650 milliGRAM(s) Oral three times a day    MEDICATIONS  (PRN):  dextrose Oral Gel 15 Gram(s) Oral once PRN Blood Glucose LESS THAN 70 milliGRAM(s)/deciliter  melatonin 3 milliGRAM(s) Oral at bedtime PRN Insomnia      ALLERGIES:  Allergies    No Known Allergies    Intolerances        LABS:                        7.7    16.83 )-----------( 106      ( 26 Sep 2023 00:05 )             22.6     09-26    140  |  97<L>  |  33<H>  ----------------------------<  85  4.0   |  31  |  2.10<H>    Ca    8.1<L>      26 Sep 2023 00:05  Phos  3.1     09-26  Mg     2.10     09-26    TPro  5.9<L>  /  Alb  3.2<L>  /  TBili  1.1  /  DBili  x   /  AST  922<H>  /  ALT  585<H>  /  AlkPhos  799<H>  09-26    PT/INR - ( 26 Sep 2023 00:05 )   PT: 26.8 sec;   INR: 2.43 ratio         PTT - ( 26 Sep 2023 00:05 )  PTT:159.7 sec  Urinalysis Basic - ( 26 Sep 2023 00:05 )    Color: x / Appearance: x / SG: x / pH: x  Gluc: 85 mg/dL / Ketone: x  / Bili: x / Urobili: x   Blood: x / Protein: x / Nitrite: x   Leuk Esterase: x / RBC: x / WBC x   Sq Epi: x / Non Sq Epi: x / Bacteria: x        RADIOLOGY & ADDITIONAL TESTS: Reviewed. INTERVAL HPI/OVERNIGHT EVENTS:  Patient started bleeding from her central line, at which time 0.5 amp of Vitamin K12 and FFP were initiated. She had 1 BM, denies any diarrhea. She had a Singh output of 1650 mL.   SUBJECTIVE: Patient seen and examined at bedside.       VITAL SIGNS:  ICU Vital Signs Last 24 Hrs  T(C): 36.6 (26 Sep 2023 04:00), Max: 36.8 (26 Sep 2023 00:00)  T(F): 97.8 (26 Sep 2023 04:00), Max: 98.3 (26 Sep 2023 00:00)  HR: 66 (26 Sep 2023 06:26) (63 - 71)  BP: 156/72 (26 Sep 2023 06:00) (102/71 - 156/72)  BP(mean): 99 (26 Sep 2023 06:00) (71 - 99)  ABP: 135/66 (25 Sep 2023 23:00) (106/52 - 145/69)  ABP(mean): 93 (25 Sep 2023 23:00) (71 - 101)  RR: 13 (26 Sep 2023 06:00) (9 - 29)  SpO2: 96% (26 Sep 2023 06:26) (93% - 100%)    O2 Parameters below as of 26 Sep 2023 06:00  Patient On (Oxygen Delivery Method): nasal cannula w/ humidification  O2 Flow (L/min): 2          Plateau pressure:   P/F ratio:     09-25 @ 07:01  -  09-26 @ 07:00  --------------------------------------------------------  IN: 1104.6 mL / OUT: 1650 mL / NET: -545.4 mL      CAPILLARY BLOOD GLUCOSE      POCT Blood Glucose.: 127 mg/dL (26 Sep 2023 03:40)    ECG:    PHYSICAL EXAM:  VITALS:   T(C): 36.6 (09-26-23 @ 04:00), Max: 36.8 (09-26-23 @ 00:00)  HR: 66 (09-26-23 @ 06:26) (63 - 71)  BP: 156/72 (09-26-23 @ 06:00) (102/71 - 156/72)  RR: 13 (09-26-23 @ 06:00) (9 - 29)  SpO2: 96% (09-26-23 @ 06:26) (93% - 100%)    GENERAL: Laying comfortably, NAD  EYES: EOMI, PERRL, no scleral icterus  NECK: No JVD  LUNG: Clear to auscultation bilaterally; No wheeze, crackles or rhonchi, no accessory muscle use  HEART: Regular rate and rhythm; No murmurs, rubs, or gallops  ABDOMEN: Soft, Nontender, Nondistended  EXTREMITIES:  No LE edema, 2+ Peripheral Pulses, No clubbing, cyanosis, or edema  PSYCH: AAOx3  NEUROLOGY: non-focal, strength 5/5 in all extremities, sensation intact  SKIN: No rashes or lesions      MEDICATIONS:  MEDICATIONS  (STANDING):  chlorhexidine 2% Cloths 1 Application(s) Topical <User Schedule>  dextrose 5%. 1000 milliLiter(s) (100 mL/Hr) IV Continuous <Continuous>  dextrose 5%. 1000 milliLiter(s) (50 mL/Hr) IV Continuous <Continuous>  dextrose 50% Injectable 25 Gram(s) IV Push once  dextrose 50% Injectable 25 Gram(s) IV Push once  dextrose 50% Injectable 12.5 Gram(s) IV Push once  glucagon  Injectable 1 milliGRAM(s) IntraMuscular once  heparin   Injectable 5000 Unit(s) SubCutaneous every 8 hours  hydrocortisone sodium succinate Injectable 50 milliGRAM(s) IV Push every 6 hours  insulin lispro (ADMELOG) corrective regimen sliding scale   SubCutaneous at bedtime  insulin lispro (ADMELOG) corrective regimen sliding scale   SubCutaneous three times a day before meals  insulin lispro Injectable (ADMELOG) 2 Unit(s) SubCutaneous three times a day before meals  levothyroxine 125 MICROGram(s) Oral daily  pantoprazole    Tablet 40 milliGRAM(s) Oral before breakfast  piperacillin/tazobactam IVPB.. 3.375 Gram(s) IV Intermittent every 12 hours  polyethylene glycol 3350 17 Gram(s) Oral daily  senna 2 Tablet(s) Oral at bedtime  sodium bicarbonate 650 milliGRAM(s) Oral three times a day    MEDICATIONS  (PRN):  dextrose Oral Gel 15 Gram(s) Oral once PRN Blood Glucose LESS THAN 70 milliGRAM(s)/deciliter  melatonin 3 milliGRAM(s) Oral at bedtime PRN Insomnia      ALLERGIES:  Allergies    No Known Allergies    Intolerances        LABS:                        7.7    16.83 )-----------( 106      ( 26 Sep 2023 00:05 )             22.6     09-26    140  |  97<L>  |  33<H>  ----------------------------<  85  4.0   |  31  |  2.10<H>    Ca    8.1<L>      26 Sep 2023 00:05  Phos  3.1     09-26  Mg     2.10     09-26    TPro  5.9<L>  /  Alb  3.2<L>  /  TBili  1.1  /  DBili  x   /  AST  922<H>  /  ALT  585<H>  /  AlkPhos  799<H>  09-26    PT/INR - ( 26 Sep 2023 00:05 )   PT: 26.8 sec;   INR: 2.43 ratio         PTT - ( 26 Sep 2023 00:05 )  PTT:159.7 sec  Urinalysis Basic - ( 26 Sep 2023 00:05 )    Color: x / Appearance: x / SG: x / pH: x  Gluc: 85 mg/dL / Ketone: x  / Bili: x / Urobili: x   Blood: x / Protein: x / Nitrite: x   Leuk Esterase: x / RBC: x / WBC x   Sq Epi: x / Non Sq Epi: x / Bacteria: x        RADIOLOGY & ADDITIONAL TESTS: Reviewed.

## 2023-09-26 NOTE — PROGRESS NOTE ADULT - ASSESSMENT
ASSESSMENT  Ms. 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   PLAN  =====Neurologic=====  No active issues    =====Pulmonary=====  #Dyspnea   Plan: likely d/t Acute on chronic right sided CHF vs progressive renal failure. Less likely d/t PE. However, will need to r/o with PE, but given kidney dysfunction will do dopplers of LE first to r/o DVT given high risk of ESRD with contrast    - Neg duplex US  of lower extremity on 9/22  -troponin 65->64  - pro-BNP 2092 --> 1819r  - RVP negative   -TTE showing HFpEF with elevated RVSP and RV dilation.   - Currently on 4L NC    =====Cardiovascular=====  #Cardiogenic shock  - i/s/o pericardial effusion s/p drain  - 9/25 POCUS f/u  --> Bilateral femoral and popliteal venous compression noted bilaterally. INTERPRETATION: Bilateral pleural effusions at the lung bases left>right with an off-axis heart, trace pericardial effusion and low suspicion of DVT  - lactate improved today 1.4<--3.7 <-- 11.6   - Off levo during HD, will continue if needed with hold parameters  - CT a/p 09/24 showing no evidence of bleed  - TTE 09/24 showing evidence of RA and RV partial compression consistent with possible tamponade physiology  - F/u repeat TTE 09/25 ordered; appreciate cardiology recs    #Acute on chronic diastolic congestive heart failure  - Last EF 8/2023 69%  - C/w HD as per nephro; last session Monday, 9/25   - Strict I/O & Daily standing weights  - Monitor lytes replete K>4.0 and Mg>2.0  - Trend SCr 2.91 on 9/25  - Remains oliguric   - HF following, appreciate recommendations    #HTN  - Dc'd Hydralazine. Isosorbide dinitrate, Nifedipine on 9/24  - BPs WNL over last 24h    =====GI=====  #GERD  - Protonix 40mg IV QD  #DIET CC w/ evening snack    #Nausea   - i/s/o kidney injury, rising lactate  - zofran PRN    #Diet  - CC diet w/ volume restriction    =====Renal/=====  #Electrolytes  - Maintain K>4, Phos>3, Mag>2, iCal>1    #Hyponatremia  - Pt. with hypervolemia hyponatremia, today improved to 135  - Pt. initiated on IV Bumex infusion for hypervolemia management->Urgent HD  - fluid restriction (<1 L/day).   - Monitor SNa q8 hrs.   - Avoid overcorrection of Na. (4-6/ 24hr)     #Acute renal failure superimposed on chronic kidney disease.   - Pt with CKD 3-4, baseline cr approximately 2, now with ISAMAR on CKD  - Monitor renal function/UOP, avoid nephrotoxins  - Sodium Bicarb Tabs  - S/p HD 9/25 for volume overload and oliguria,     #Metabolic acidosis  Pt. with metabolic acidosis in the setting of volume overload and renal failure. improving pH 7.34 <-- 7.18 <-- 7.19   - iHD per nephro, as outlined above.   - Monitor SCO2    =====Endocrine=====  #DM2  - FSBG   - MISS <-- ADRIAN given elevated blood glucose.   - received 6 lantus 9/25 AM and will reevaluated basal/sliding scale needs 9/26 AM    #Hypothyroidism  - TSH 4.58; C/w 125 synthroid daily    =====Infectious Disease=====  Pt hypothermic on oral and rectal temp (unable to obtain temp on thermometer)  - Temps 95-97 range  - Urine and Blood Cx NGTD    =====Heme/Onc=====  #DVT Ppx  - heparin 5000 subQ    #Anemia  - Hgb 7-8, not clinically bleeding. Likely in s/o renal disease.   - CTM, maintain Hgb >7 (6.9 -->8.2 on 9/25 s/p HD and 1 unit pRBC)    =====Ethics=====  FULL CODE   ASSESSMENT  Ms. 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   PLAN  =====Neurologic=====  No active issues    =====Pulmonary=====  #Dyspnea   Plan: likely d/t Acute on chronic right sided CHF vs progressive renal failure. Less likely d/t PE. However, will need to r/o with PE, but given kidney dysfunction will do dopplers of LE first to r/o DVT given high risk of ESRD with contrast    - Neg duplex US  of lower extremity on 9/22  -troponin 65->64  - pro-BNP 2092 --> 1819r  - RVP negative   -TTE showing HFpEF with elevated RVSP and RV dilation.   - Currently on 2L NC    =====Cardiovascular=====  #Cardiogenic shock  - i/s/o pericardial effusion s/p drain  - 9/25 POCUS f/u  --> Bilateral femoral and popliteal venous compression noted bilaterally. INTERPRETATION: Bilateral pleural effusions at the lung bases left>right with an off-axis heart, trace pericardial effusion and low suspicion of DVT  - lactate improved 1.4<--3.7 <-- 11.6   - Off levo during HD, will continue if needed with hold parameters  - CT a/p 09/24 showing no evidence of bleed  - TTE 09/24 showing evidence of RA and RV partial compression consistent with possible tamponade physiology  - Repeat TTE 09/25 ordered; Cardiology signed off on her downgrade to telemetry    #Acute on chronic diastolic congestive heart failure  - Last EF 8/2023 69%  - C/w HD as per nephro; last session Tuesday 9/26  - Strict I/O & Daily standing weights  - Monitor lytes replete K>4.0 and Mg>2.0  - SCr increased slightly to 2.1 from 1.57 on 9/25, getting HD today  - Remains oliguric   - HF following, appreciate recommendations    #HTN  - Dc'd Hydralazine. Isosorbide dinitrate, Nifedipine on 9/24  - BPs WNL over last 24h    =====GI=====  #GERD  - Protonix 40mg IV QD  #DIET CC w/ evening snack    #Nausea   - i/s/o kidney injury, rising lactate  - zofran PRN    #Diet  - CC diet w/ volume restriction    =====Renal/=====  #Electrolytes  - Maintain K>4, Phos>3, Mag>2, iCal>1    #Hyponatremia  - Pt. with hypervolemia hyponatremia, today improved to 140  - Pt. initiated on IV Bumex infusion for hypervolemia management->Urgent HD  - fluid restriction (<1 L/day).   - Monitor SNa q12 hrs.   - Avoid overcorrection of Na. (4-6/ 24hr)     #Acute renal failure superimposed on chronic kidney disease.   - Pt with CKD 3-4, baseline cr approximately 2, now with ISAMAR on CKD  - Monitor renal function/UOP, avoid nephrotoxins  - Sodium Bicarb Tabs  - S/p HD 9/25 for volume overload and oliguria,     #Metabolic acidosis  Pt. with metabolic acidosis in the setting of volume overload and renal failure. improving pH 7.34 <-- 7.18 <-- 7.19   - iHD per nephro, as outlined above.   - Monitor SCO2    =====Endocrine=====  #DM2  - FSBG   - MISS <-- ADRIAN given elevated blood glucose.   - received 6 lantus 9/25 AM and will reevaluated basal/sliding scale needs 9/26 AM    #Hypothyroidism  - TSH 4.58; C/w 125 synthroid daily    =====Infectious Disease=====  Pt hypothermic on oral and rectal temp (unable to obtain temp on thermometer)  - Temps 95-97 range  - Urine and Blood Cx NGTD    =====Heme/Onc=====  #DVT Ppx  - heparin 5000 subQ    #Anemia  - Hgb 7-8, not clinically bleeding. Likely in s/o renal disease.   - CTM, maintain Hgb >7 (6.9 -->8.2 on 9/25 s/p HD and 1 unit pRBC)    =====Ethics=====  FULL CODE   ASSESSMENT  Ms. Hernandez is a 57 yo F w/ HTN, HLD, DM2, CKD (stage 3-4), hypothyroidism (c/b myxedema coma in past), severe pulmonary hypertension, mod-severe TR, asthma, HFpEF, presents for progressive dyspnea at rest with left sided chest tightness, found to be in ADHF, admitted to MICU for urgent HD due to acidosis and volume OL, anuric on MWF HD.   PLAN  =====Neurologic=====  No active issues    =====Pulmonary=====  #Dyspnea   Plan: likely d/t Acute on chronic right sided CHF vs progressive renal failure. Less likely d/t PE. However, will need to r/o with PE, but given kidney dysfunction will do dopplers of LE first to r/o DVT given high risk of ESRD with contrast    - Neg duplex US  of lower extremity on 9/22  -troponin 65->64  - pro-BNP 2092 --> 1819r  - RVP negative   -TTE showing HFpEF with elevated RVSP and RV dilation.   - Currently on 2L NC    =====Cardiovascular=====  #Cardiogenic shock  - i/s/o pericardial effusion s/p drain  - 9/25 POCUS f/u  --> Bilateral femoral and popliteal venous compression noted bilaterally. INTERPRETATION: Bilateral pleural effusions at the lung bases left>right with an off-axis heart, trace pericardial effusion and low suspicion of DVT  - lactate improved 1.4<--3.7 <-- 11.6   - Off levo during HD, will continue if needed with hold parameters  - CT a/p 09/24 showing no evidence of bleed  - TTE 09/24 showing evidence of RA and RV partial compression consistent with possible tamponade physiology  - Repeat TTE 09/25 ordered; Cardiology signed off on her downgrade to telemetry    #Acute on chronic diastolic congestive heart failure  - Last EF 8/2023 69%  - C/w HD as per nephro; last session Tuesday 9/26  - Strict I/O & Daily standing weights  - Monitor lytes replete K>4.0 and Mg>2.0  - SCr increased slightly to 2.1 from 1.57 on 9/25, getting HD today  - Remains oliguric   - HF following, appreciate recommendations    #HTN  - Dc'd Hydralazine. Isosorbide dinitrate, Nifedipine on 9/24  - BPs WNL over last 24h    =====GI=====  #GERD  - Protonix 40mg IV QD  #DIET CC w/ evening snack    #Nausea   - i/s/o kidney injury, rising lactate  - zofran PRN    #Diet  - CC diet w/ volume restriction    =====Renal/=====  #Electrolytes  - Maintain K>4, Phos>3, Mag>2, iCal>1    #Hyponatremia  - Pt. with hypervolemia hyponatremia, today improved to 140  - Pt. initiated on IV Bumex infusion for hypervolemia management->Urgent HD  - fluid restriction (<1 L/day).   - Monitor SNa q12 hrs.   - Avoid overcorrection of Na. (4-6/ 24hr)     #Acute renal failure superimposed on chronic kidney disease.   - Pt with CKD 3-4, baseline cr approximately 2, now with ISAMAR on CKD  - Monitor renal function/UOP, avoid nephrotoxins  - Sodium Bicarb Tabs  - S/p HD 9/25 and 9/26 for volume overload and oliguria,     #Metabolic acidosis  Pt. with metabolic acidosis in the setting of volume overload and renal failure. improving pH 7.44 <-- 7.18 <-- 7.19   - iHD per nephro, as outlined above.   - Monitor SCO2    =====Endocrine=====  #DM2  - FSBG   - MISS <-- ADRIAN given elevated blood glucose.   - Basal insulin held 9/26 AM d/t  low POCT 80    #Hypothyroidism  - TSH 4.58; C/w 125 synthroid daily    =====Infectious Disease=====  Pt hypothermic on oral and rectal temp (unable to obtain temp on thermometer)  - Temps 95-97 range  - Urine and Blood Cx NGTD    =====Heme/Onc=====  #DVT Ppx  - DC heparin 5000 subQ  - SCD initiated    #Anemia  - Hgb 7-8, not clinically bleeding. Likely in s/o renal disease.   - CTM, maintain Hgb >7 (6.9 -->8.2 on 9/25 s/p HD and 1 unit pRBC)    =====Ethics=====  FULL CODE   ASSESSMENT  Ms. Hernandez is a 57 yo F w/ HTN, HLD, DM2, CKD (stage 3-4), hypothyroidism (c/b myxedema coma in past), severe pulmonary hypertension, mod-severe TR, asthma, HFpEF, presents for progressive dyspnea at rest with left sided chest tightness, found to be in ADHF, admitted to MICU for urgent HD due to acidosis and volume OL, anuric on MWF HD.   PLAN  =====Neurologic=====  No active issues    =====Pulmonary=====  #Dyspnea   Plan: likely d/t Acute on chronic right sided CHF vs progressive renal failure. Less likely d/t PE. However, will need to r/o with PE, but given kidney dysfunction will do dopplers of LE first to r/o DVT given high risk of ESRD with contrast    - Neg duplex US  of lower extremity on 9/22  -troponin 65->64  - pro-BNP 2092 --> 1819r  - RVP negative   -TTE showing HFpEF with elevated RVSP and RV dilation.   - Currently on 2L NC    =====Cardiovascular=====  #Cardiogenic shock  - i/s/o pericardial effusion s/p drain  - 9/25 POCUS f/u  --> Bilateral femoral and popliteal venous compression noted bilaterally. INTERPRETATION: Bilateral pleural effusions at the lung bases left>right with an off-axis heart, trace pericardial effusion and low suspicion of DVT  - lactate improved 1.4<--3.7 <-- 11.6   - Off levo during HD, will continue if needed with hold parameters  - CT a/p 09/24 showing no evidence of bleed  - TTE 09/24 showing evidence of RA and RV partial compression consistent with possible tamponade physiology  - Repeat TTE 09/25 ordered; Cardiology signed off on her downgrade to telemetry    #Acute on chronic diastolic congestive heart failure  - Last EF 8/2023 69%  - C/w HD as per nephro; last session Tuesday 9/26  - Strict I/O & Daily standing weights  - Monitor lytes replete K>4.0 and Mg>2.0  - SCr increased slightly to 2.1 from 1.57 on 9/25, getting HD today  - Remains oliguric   - HF following, appreciate recommendations    #HTN  - Dc'd Hydralazine. Isosorbide dinitrate, Nifedipine on 9/24  - BPs WNL over last 24h    =====GI=====  #GERD  - Protonix 40mg IV QD  #DIET CC w/ evening snack    #Nausea   - i/s/o kidney injury, rising lactate  - zofran PRN    #Diet  - CC diet w/ volume restriction    =====Renal/=====  #Electrolytes  - Maintain K>4, Phos>3, Mag>2, iCal>1    #Hyponatremia  - Pt. with hypervolemia hyponatremia, today improved to 140  - Pt. initiated on IV Bumex infusion for hypervolemia management->Urgent HD  - fluid restriction (<1 L/day).   - Monitor SNa q12 hrs.   - Avoid overcorrection of Na. (4-6/ 24hr)     #Acute renal failure superimposed on chronic kidney disease.   - Pt with CKD 3-4, baseline cr approximately 2, now with ISAMAR on CKD  - Monitor renal function/UOP, avoid nephrotoxins  - Sodium Bicarb Tabs  - S/p HD 9/25 and 9/26 for volume overload and oliguria, removed 3L on 9/26.    #Metabolic acidosis  Pt. with metabolic acidosis in the setting of volume overload and renal failure. improving pH 7.44 <-- 7.18 <-- 7.19   - iHD per nephro, as outlined above.   - Monitor SCO2    =====Endocrine=====  #DM2  - FSBG   - MISS <-- ADRIAN given elevated blood glucose.   - Basal insulin held 9/26 AM d/t  low POCT 80    #Hypothyroidism  - TSH 4.58; C/w 125 synthroid daily    =====Infectious Disease=====  Pt hypothermic on oral and rectal temp (unable to obtain temp on thermometer)  - Temps 95-97 range  - Urine and Blood Cx NGTD    =====Heme/Onc=====  #DVT Ppx  - DC heparin 5000 subQ  - SCD initiated    #Anemia  - Hgb 7-8, not clinically bleeding. Likely in s/o renal disease.   - CTM, maintain Hgb >7 (6.9 -->8.2 on 9/25 s/p HD and 1 unit pRBC)    =====Ethics=====  FULL CODE

## 2023-09-26 NOTE — PROCEDURE NOTE - ADDITIONAL PROCEDURE DETAILS
Critically ill pt requiring PIV access for medical management and/or pressor support. Under US guidance identified Left basilic vein, and successfully placed 20g x 10cm powerglide extend dwell angiocath into vessel. Placement confirmed s/p with ultrasound and catheter determined to be in patent lumen of vein. Pt tolerated well w/o complication.

## 2023-09-26 NOTE — PROCEDURE NOTE - NSSITEPREP_SKIN_A_CORE
chlorhexidine/Adherence to aseptic technique: hand hygiene prior to donning barriers (gown, gloves), don cap and mask, sterile drape over patient

## 2023-09-26 NOTE — PROGRESS NOTE ADULT - ATTENDING COMMENTS
58F with CKD, sev.pHTN, HFpEF presented with volume overload admitted to MICU for iHD with course c/b obstructive shock in the setting of pericardial tamponade now s/p pericardial drain with hemodynamic and clinical resolution     Neuro: Delirium precautions  CV: Obstructive shock iso uremic pericardial effusion w/ tamponade physiology s/p pericardial drain  Pulm: Remains on room air, iHD to optimize volume status  GI: Renal diet  Renal, electrolytes; ARF on CKD c/b volume overload, metabolic disarray  - Cont trend lytes, HD, appreciate renal input  DVT ppx - hsq  GOC - full code

## 2023-09-26 NOTE — PROGRESS NOTE ADULT - PROBLEM SELECTOR PLAN 1
Pt. with oliguric ISAMAR on CKD in setting of HF and fluid overload. On review of labs on [place], Scr elevated at 3.31 on 8/30/23, increased to 3.92 on admission (9/20). Pt. initiated on IV Bumex infusion on 9/21. However, pt. remained anuric despite increase in IV Bumex infusion rate. Scr increased to 3.89 on 9/22. Pt. underwent right IJ non-tunneled HD catheter placement and received 1st HD treatment on 9/22. Last HD treatment was on 9/25. Pt. clinically stable. Labs reviewed. Pt. remains oliguric. Plan for HD treatment today. Monitor labs and urine output. Avoid any potential nephrotoxins. Dose medications as per eGFR.    If you have any questions, please feel free to contact me  Cody Mcguire  Nephrology Fellow  [phone] / [employer] Teams(Preferred)  (After 5pm or on weekends please page the on-call fellow) Pt. with oliguric ISAMAR on CKD in setting of HF and fluid overload. On review of labs on [place], Scr elevated at 3.31 on 8/30/23, increased to 3.92 on admission (9/20). Pt. initiated on IV Bumex infusion on 9/21. However, pt. remained anuric despite increase in IV Bumex infusion rate. Scr increased to 3.89 on 9/22. Pt. underwent right IJ non-tunneled HD catheter placement and received 1st HD treatment on 9/22. Pt. received HD treatment yesterday in [facility]. Pt. clinically stable. Labs reviewed. Pt. remains oliguric. Plan for HD/UF treatment today. Monitor labs and urine output. Avoid any potential nephrotoxins. Dose medications as per eGFR.    If you have any questions, please feel free to contact me  Cody Mcguire  Nephrology Fellow  [phone] / [employer] Teams(Preferred)  (After 5pm or on weekends please page the on-call fellow)

## 2023-09-26 NOTE — PROCEDURE NOTE - NSPROCDETAILS_GEN_ALL_CORE
location identified, draped/prepped, sterile technique used/sterile dressing applied/sterile technique, catheter placed/ultrasound guidance
guidewire recovered/lumen(s) aspirated and flushed/sterile dressing applied/sterile technique, catheter placed/ultrasound guidance with use of sterile gel and probe cove
guidewire recovered/lumen(s) aspirated and flushed/sterile dressing applied/sterile technique, catheter placed/ultrasound guidance with use of sterile gel and probe cove

## 2023-09-26 NOTE — PROGRESS NOTE ADULT - SUBJECTIVE AND OBJECTIVE BOX
[facility] Division of Kidney Diseases & Hypertension  FOLLOW UP NOTE  [phone]--------------------------------------------------------------------------------    Chief Complaint: ISAMAR on CKD, Fluid Overload    24 hour events/subjective: Pt. with ISAMAR on CKD, initiated on HD on 9/22/23. Pt. was seen and evaluated in the MICU this morning. Reports improvement in B/L LE edema and dyspnea. Denies any headaches, fevers/chills, chest pain, and abdominal pain.      PAST HISTORY  --------------------------------------------------------------------------------  No significant changes to PMH, PSH, FHx, SHx, unless otherwise noted    ALLERGIES & MEDICATIONS  --------------------------------------------------------------------------------  Allergies    No Known Allergies    Intolerances      Standing Inpatient Medications  chlorhexidine 2% Cloths 1 Application(s) Topical <User Schedule>  dextrose 5%. 1000 milliLiter(s) IV Continuous <Continuous>  dextrose 5%. 1000 milliLiter(s) IV Continuous <Continuous>  dextrose 50% Injectable 12.5 Gram(s) IV Push once  dextrose 50% Injectable 25 Gram(s) IV Push once  dextrose 50% Injectable 25 Gram(s) IV Push once  glucagon  Injectable 1 milliGRAM(s) IntraMuscular once  heparin   Injectable 5000 Unit(s) SubCutaneous every 8 hours  hydrocortisone sodium succinate Injectable 50 milliGRAM(s) IV Push every 12 hours  insulin lispro (ADMELOG) corrective regimen sliding scale   SubCutaneous at bedtime  insulin lispro (ADMELOG) corrective regimen sliding scale   SubCutaneous three times a day before meals  levothyroxine 125 MICROGram(s) Oral daily  pantoprazole    Tablet 40 milliGRAM(s) Oral before breakfast  piperacillin/tazobactam IVPB.. 3.375 Gram(s) IV Intermittent every 12 hours  polyethylene glycol 3350 17 Gram(s) Oral daily  senna 2 Tablet(s) Oral at bedtime  sodium bicarbonate 650 milliGRAM(s) Oral three times a day    PRN Inpatient Medications  dextrose Oral Gel 15 Gram(s) Oral once PRN  melatonin 3 milliGRAM(s) Oral at bedtime PRN      REVIEW OF SYSTEMS  --------------------------------------------------------------------------------  Gen: No fever  Skin: No rash  Head/Eyes/Ears: No HA   Respiratory: +Decreased dyspnea  CV: No chest pain  GI: No abdominal pain, diarrhea  : +Decreased UOP  MSK: +Decreased B/L LE edema  Heme: No easy bruising or bleeding  Psych: No significant depression    All other systems were reviewed and are negative, except as noted.    VITALS/PHYSICAL EXAM  --------------------------------------------------------------------------------  T(C): 35.6 (09-26-23 @ 08:00), Max: 36.8 (09-26-23 @ 00:00)  HR: 64 (09-26-23 @ 09:00) (64 - 71)  BP: 148/78 (09-26-23 @ 09:00) (102/71 - 166/86)  RR: 17 (09-26-23 @ 09:00) (9 - 29)  SpO2: 98% (09-26-23 @ 09:00) (93% - 100%)  Wt(kg): --    09-25-23 @ 07:01  -  09-26-23 @ 07:00  --------------------------------------------------------  IN: 1104.6 mL / OUT: 1650 mL / NET: -545.4 mL    09-26-23 @ 07:01  -  09-26-23 @ 10:13  --------------------------------------------------------  IN: 100 mL / OUT: 5 mL / NET: 95 mL      Physical Exam:  Gen: resting, NAD   HEENT: Receiving supplemental oxygen via nasal cannula  Pulm: CTA B/L  CV: S1S2+, +pericardial drain with bloody fluid noted  Abd: Soft, +BS   Ext: No LE edema   Neuro: Awake and alert  Skin: Warm and dry  Vascular access: +Right IJ non-tunneled HD catheter    LABS/STUDIES  --------------------------------------------------------------------------------              7.7    16.83 >-----------<  106      [09-26-23 @ 00:05]              22.6     140  |  97  |  33  ----------------------------<  85      [09-26-23 @ 00:05]  4.0   |  31  |  2.10        Ca     8.1     [09-26-23 @ 00:05]      iCa    1.06     [09-26 @ 00:05]      Mg     2.10     [09-26-23 @ 00:05]      Phos  3.1     [09-26-23 @ 00:05]    TPro  5.9  /  Alb  3.2  /  TBili  1.1  /  DBili  x   /  AST  922  /  ALT  585  /  AlkPhos  799  [09-26-23 @ 00:05]    PT/INR: PT 26.8 , INR 2.43       [09-26-23 @ 00:05]  PTT: 159.7      [09-26-23 @ 00:05]    Creatinine Trend:  SCr 2.10 [09-26 @ 00:05]  SCr 1.57 [09-25 @ 15:20]  SCr 2.91 [09-25 @ 00:25]  SCr 2.89 [09-24 @ 16:00]  SCr 2.58 [09-24 @ 02:28]    TSH 4.58      [09-22-23 @ 08:04]    HBsAb <3.0      [09-22-23 @ 17:55]  HBsAg Nonreact      [09-22-23 @ 17:55]  HCV 0.10, Nonreact      [09-22-23 @ 17:55] [facility] Division of Kidney Diseases & Hypertension  FOLLOW UP NOTE  [phone]--------------------------------------------------------------------------------    Chief Complaint: ISAMAR on CKD, Fluid Overload    24 hour events/subjective: Pt. with ISAMAR on CKD, initiated on HD on 9/22/23. Pt. received HD yesterday in MICU. Pt. seen and evaluated in the MICU this morning. Reports improvement in B/L LE edema and dyspnea. Denies any headaches, fevers/chills, chest pain, and abdominal pain.    PAST HISTORY  --------------------------------------------------------------------------------  No significant changes to PMH, PSH, FHx, SHx, unless otherwise noted    ALLERGIES & MEDICATIONS  --------------------------------------------------------------------------------  Allergies    No Known Allergies    Intolerances    Standing Inpatient Medications  chlorhexidine 2% Cloths 1 Application(s) Topical <User Schedule>  dextrose 5%. 1000 milliLiter(s) IV Continuous <Continuous>  dextrose 5%. 1000 milliLiter(s) IV Continuous <Continuous>  dextrose 50% Injectable 12.5 Gram(s) IV Push once  dextrose 50% Injectable 25 Gram(s) IV Push once  dextrose 50% Injectable 25 Gram(s) IV Push once  glucagon  Injectable 1 milliGRAM(s) IntraMuscular once  heparin   Injectable 5000 Unit(s) SubCutaneous every 8 hours  hydrocortisone sodium succinate Injectable 50 milliGRAM(s) IV Push every 12 hours  insulin lispro (ADMELOG) corrective regimen sliding scale   SubCutaneous at bedtime  insulin lispro (ADMELOG) corrective regimen sliding scale   SubCutaneous three times a day before meals  levothyroxine 125 MICROGram(s) Oral daily  pantoprazole    Tablet 40 milliGRAM(s) Oral before breakfast  piperacillin/tazobactam IVPB.. 3.375 Gram(s) IV Intermittent every 12 hours  polyethylene glycol 3350 17 Gram(s) Oral daily  senna 2 Tablet(s) Oral at bedtime  sodium bicarbonate 650 milliGRAM(s) Oral three times a day    PRN Inpatient Medications  dextrose Oral Gel 15 Gram(s) Oral once PRN  melatonin 3 milliGRAM(s) Oral at bedtime PRN    REVIEW OF SYSTEMS  --------------------------------------------------------------------------------  Gen: No fever  Skin: No rash  Head/Eyes/Ears: No HA   Respiratory: +Decreased dyspnea  CV: No chest pain  GI: No abdominal pain, diarrhea  : +Decreased UOP  MSK: +Decreased B/L LE edema  Heme: No easy bruising or bleeding  Psych: No significant depression    All other systems were reviewed and are negative, except as noted.    VITALS/PHYSICAL EXAM  --------------------------------------------------------------------------------  T(C): 35.6 (09-26-23 @ 08:00), Max: 36.8 (09-26-23 @ 00:00)  HR: 64 (09-26-23 @ 09:00) (64 - 71)  BP: 148/78 (09-26-23 @ 09:00) (102/71 - 166/86)  RR: 17 (09-26-23 @ 09:00) (9 - 29)  SpO2: 98% (09-26-23 @ 09:00) (93% - 100%)  Wt(kg): --    09-25-23 @ 07:01  -  09-26-23 @ 07:00  --------------------------------------------------------  IN: 1104.6 mL / OUT: 1650 mL / NET: -545.4 mL    09-26-23 @ 07:01  -  09-26-23 @ 10:13  --------------------------------------------------------  IN: 100 mL / OUT: 5 mL / NET: 95 mL    Physical Exam:  Gen: resting, NAD   HEENT: Receiving supplemental oxygen via nasal cannula  Pulm: CTA B/L  CV: S1S2+, +pericardial drain with bloody fluid noted  Abd: Soft, +BS   Ext: No LE edema   Neuro: Awake and alert  Skin: Warm and dry  Vascular access: +Right IJ non-tunneled HD catheter    LABS/STUDIES  --------------------------------------------------------------------------------              7.7    16.83 >-----------<  106      [09-26-23 @ 00:05]              22.6     140  |  97  |  33  ----------------------------<  85      [09-26-23 @ 00:05]  4.0   |  31  |  2.10        Ca     8.1     [09-26-23 @ 00:05]      iCa    1.06     [09-26 @ 00:05]      Mg     2.10     [09-26-23 @ 00:05]      Phos  3.1     [09-26-23 @ 00:05]    TPro  5.9  /  Alb  3.2  /  TBili  1.1  /  DBili  x   /  AST  922  /  ALT  585  /  AlkPhos  799  [09-26-23 @ 00:05]    PT/INR: PT 26.8 , INR 2.43       [09-26-23 @ 00:05]  PTT: 159.7      [09-26-23 @ 00:05]    Creatinine Trend:  SCr 2.10 [09-26 @ 00:05]  SCr 1.57 [09-25 @ 15:20]  SCr 2.91 [09-25 @ 00:25]  SCr 2.89 [09-24 @ 16:00]  SCr 2.58 [09-24 @ 02:28]    TSH 4.58      [09-22-23 @ 08:04]    HBsAb <3.0      [09-22-23 @ 17:55]  HBsAg Nonreact      [09-22-23 @ 17:55]  HCV 0.10, Nonreact      [09-22-23 @ 17:55]

## 2023-09-26 NOTE — PROCEDURE NOTE - NSINDICATIONS_GEN_A_CORE
critical illness/emergency venous access
critical illness/dialysis/CRRT
critical patient/monitoring purposes
venous access

## 2023-09-26 NOTE — CHART NOTE - NSCHARTNOTEFT_GEN_A_CORE
Drain output is 125cc in the last 24 hours. Will keep drain in and monitor. Once output decreases to less than 30cc in 24 hours, will remove drain. ECHO done on 9/25 still with a moderate effusion.

## 2023-09-26 NOTE — CHART NOTE - NSCHARTNOTEFT_GEN_A_CORE
[facility] MICU TRANSFER NOTE    Please refer to MICU transfer note for full details.    Briefly, this is a 58F with CKD, sev.pHTN, HFpEF presented with volume overload admitted to MICU for iHD with course c/b obstructive shock in the setting of pericardial tamponade now s/p pericardial drain with hemodynamic and clinical resolution     Vital Signs Last 24 Hrs  T(C): 36.7 (26 Sep 2023 16:00), Max: 36.8 (26 Sep 2023 00:00)  T(F): 98 (26 Sep 2023 16:00), Max: 98.3 (26 Sep 2023 00:00)  HR: 68 (26 Sep 2023 19:00) (64 - 70)  BP: 147/73 (26 Sep 2023 19:00) (102/71 - 166/86)  BP(mean): 95 (26 Sep 2023 19:00) (71 - 117)  RR: 17 (26 Sep 2023 19:00) (9 - 29)  SpO2: 98% (26 Sep 2023 19:00) (93% - 100%)    Parameters below as of 26 Sep 2023 19:00  Patient On (Oxygen Delivery Method): nasal cannula w/ humidification  O2 Flow (L/min): 2    I&O's Summary    25 Sep 2023 07:01  -  26 Sep 2023 07:00  --------------------------------------------------------  IN: 1104.6 mL / OUT: 1650 mL / NET: -545.4 mL    26 Sep 2023 07:01  -  26 Sep 2023 19:51  --------------------------------------------------------  IN: 700 mL / OUT: 3405 mL / NET: -2705 mL      Allergies    No Known Allergies    Intolerances      MEDICATIONS  (STANDING):  chlorhexidine 2% Cloths 1 Application(s) Topical <User Schedule>  dextrose 5%. 1000 milliLiter(s) (100 mL/Hr) IV Continuous <Continuous>  dextrose 5%. 1000 milliLiter(s) (50 mL/Hr) IV Continuous <Continuous>  dextrose 50% Injectable 25 Gram(s) IV Push once  dextrose 50% Injectable 12.5 Gram(s) IV Push once  dextrose 50% Injectable 25 Gram(s) IV Push once  glucagon  Injectable 1 milliGRAM(s) IntraMuscular once  insulin lispro (ADMELOG) corrective regimen sliding scale   SubCutaneous three times a day before meals  insulin lispro (ADMELOG) corrective regimen sliding scale   SubCutaneous at bedtime  levothyroxine 125 MICROGram(s) Oral daily  pantoprazole    Tablet 40 milliGRAM(s) Oral before breakfast  piperacillin/tazobactam IVPB.. 3.375 Gram(s) IV Intermittent every 12 hours  polyethylene glycol 3350 17 Gram(s) Oral daily  senna 2 Tablet(s) Oral at bedtime  sodium bicarbonate 650 milliGRAM(s) Oral three times a day    MEDICATIONS  (PRN):  dextrose Oral Gel 15 Gram(s) Oral once PRN Blood Glucose LESS THAN 70 milliGRAM(s)/deciliter  melatonin 3 milliGRAM(s) Oral at bedtime PRN Insomnia                                  7.5    13.09 )-----------( 89       ( 26 Sep 2023 17:00 )             23.0     09-26    140  |  97<L>  |  33<H>  ----------------------------<  85  4.0   |  31  |  2.10<H>    Ca    8.1<L>      26 Sep 2023 00:05  Phos  3.1     09-26  Mg     2.10     09-26    TPro  5.9<L>  /  Alb  3.2<L>  /  TBili  1.1  /  DBili  x   /  AST  922<H>  /  ALT  585<H>  /  AlkPhos  799<H>  09-26    PT/INR - ( 26 Sep 2023 17:00 )   PT: 17.2 sec;   INR: 1.56 ratio         PTT - ( 26 Sep 2023 17:00 )  PTT:70.6 sec        FOR FOLLOW UP:    [ ] F/u Nephrology recs re: more HD or permanent HD, need for access  [ ] F/u HF recs  [ ] F/u pericardial drain removal  [ ] Once output decreases to less than 30cc in 24 hours, interventional NP will remove drain - contact once   [ ] F/u Lactate (decreasing)   [ ] F/u Pericardial fluid Cx and need for Abx

## 2023-09-26 NOTE — PROGRESS NOTE ADULT - ATTENDING COMMENTS
Pt. with oliguric ISAMAR on CKD, initiated on HD on 9/22/23. Pt. received HD in MICU yesterday. Pt. feels better. Assessment and plan for ISAMAR on CKD as outlined above. Plan for HD/UF treatment today. Monitor labs and urine output. Avoid any potential nephrotoxins. Dose medications as per eGFR.

## 2023-09-26 NOTE — PROCEDURE NOTE - NSPOSTCAREGUIDE_GEN_A_CORE
Care for catheter as per unit/ICU protocols
Care for catheter as per unit/ICU protocols
Verbal/written post procedure instructions were given to patient/caregiver
Care for catheter as per unit/ICU protocols

## 2023-09-26 NOTE — PROVIDER CONTACT NOTE (HYPOGLYCEMIA EVENT) - NS PROVIDER CONTACT BACKGROUND-HYPO
Age: 58y    Gender: Female    POCT Blood Glucose:  56 mg/dL (09-26-23 @ 17:26)  58 mg/dL (09-26-23 @ 17:25)  88 mg/dL (09-26-23 @ 11:55)  81 mg/dL (09-26-23 @ 08:23)  127 mg/dL (09-26-23 @ 03:40)  113 mg/dL (09-25-23 @ 21:47)      eMAR:  dextrose 50% Injectable   25 milliLiter(s) IV Push (09-26-23 @ 03:15)    hydrocortisone sodium succinate Injectable   50 milliGRAM(s) IV Push (09-26-23 @ 17:31)    hydrocortisone sodium succinate Injectable   50 milliGRAM(s) IV Push (09-26-23 @ 05:55)   50 milliGRAM(s) IV Push (09-25-23 @ 23:17)    insulin lispro (ADMELOG) corrective regimen sliding scale   0 Unit(s) SubCutaneous (09-25-23 @ 21:49)    levothyroxine   125 MICROGram(s) Oral (09-26-23 @ 05:00)    
Age: 58y    Gender: Female    POCT Blood Glucose:  179 mg/dL (09-24-23 @ 17:04)  159 mg/dL (09-24-23 @ 11:33)  194 mg/dL (09-24-23 @ 10:35)  197 mg/dL (09-24-23 @ 10:22)  59 mg/dL (09-24-23 @ 09:58)  178 mg/dL (09-24-23 @ 00:07)      eMAR:atorvastatin   40 milliGRAM(s) Oral (09-23-23 @ 23:54)    dextrose 50% Injectable   50 milliLiter(s) IV Push (09-24-23 @ 11:39)    hydrocortisone sodium succinate Injectable   50 milliGRAM(s) IV Push (09-24-23 @ 16:29)    insulin lispro (ADMELOG) corrective regimen sliding scale   1 Unit(s) SubCutaneous (09-24-23 @ 18:28)   1 Unit(s) SubCutaneous (09-24-23 @ 11:38)    insulin lispro (ADMELOG) corrective regimen sliding scale   1  SubCutaneous (09-24-23 @ 16:46)    levothyroxine   125 MICROGram(s) Oral (09-24-23 @ 05:19)

## 2023-09-27 DIAGNOSIS — I31.39 OTHER PERICARDIAL EFFUSION (NONINFLAMMATORY): ICD-10-CM

## 2023-09-27 DIAGNOSIS — K29.70 GASTRITIS, UNSPECIFIED, WITHOUT BLEEDING: ICD-10-CM

## 2023-09-27 DIAGNOSIS — J96.01 ACUTE RESPIRATORY FAILURE WITH HYPOXIA: ICD-10-CM

## 2023-09-27 DIAGNOSIS — R74.01 ELEVATION OF LEVELS OF LIVER TRANSAMINASE LEVELS: ICD-10-CM

## 2023-09-27 DIAGNOSIS — D64.9 ANEMIA, UNSPECIFIED: ICD-10-CM

## 2023-09-27 DIAGNOSIS — J18.9 PNEUMONIA, UNSPECIFIED ORGANISM: ICD-10-CM

## 2023-09-27 DIAGNOSIS — I30.9 ACUTE PERICARDITIS, UNSPECIFIED: ICD-10-CM

## 2023-09-27 LAB
ALBUMIN SERPL ELPH-MCNC: 3.2 G/DL — LOW (ref 3.3–5)
ALP SERPL-CCNC: 879 U/L — HIGH (ref 40–120)
ALT FLD-CCNC: 476 U/L — HIGH (ref 4–33)
ANION GAP SERPL CALC-SCNC: 12 MMOL/L — SIGNIFICANT CHANGE UP (ref 7–14)
AST SERPL-CCNC: 620 U/L — HIGH (ref 4–32)
BASOPHILS # BLD AUTO: 0.01 K/UL — SIGNIFICANT CHANGE UP (ref 0–0.2)
BASOPHILS NFR BLD AUTO: 0.1 % — SIGNIFICANT CHANGE UP (ref 0–2)
BILIRUB SERPL-MCNC: 0.9 MG/DL — SIGNIFICANT CHANGE UP (ref 0.2–1.2)
BUN SERPL-MCNC: 25 MG/DL — HIGH (ref 7–23)
CALCIUM SERPL-MCNC: 8.5 MG/DL — SIGNIFICANT CHANGE UP (ref 8.4–10.5)
CHLORIDE SERPL-SCNC: 97 MMOL/L — LOW (ref 98–107)
CO2 SERPL-SCNC: 27 MMOL/L — SIGNIFICANT CHANGE UP (ref 22–31)
CREAT SERPL-MCNC: 2.01 MG/DL — HIGH (ref 0.5–1.3)
CULTURE RESULTS: SIGNIFICANT CHANGE UP
EGFR: 28 ML/MIN/1.73M2 — LOW
EOSINOPHIL # BLD AUTO: 0.04 K/UL — SIGNIFICANT CHANGE UP (ref 0–0.5)
EOSINOPHIL NFR BLD AUTO: 0.3 % — SIGNIFICANT CHANGE UP (ref 0–6)
GLUCOSE BLDC GLUCOMTR-MCNC: 105 MG/DL — HIGH (ref 70–99)
GLUCOSE BLDC GLUCOMTR-MCNC: 133 MG/DL — HIGH (ref 70–99)
GLUCOSE BLDC GLUCOMTR-MCNC: 134 MG/DL — HIGH (ref 70–99)
GLUCOSE BLDC GLUCOMTR-MCNC: 180 MG/DL — HIGH (ref 70–99)
GLUCOSE SERPL-MCNC: 138 MG/DL — HIGH (ref 70–99)
HCT VFR BLD CALC: 23.7 % — LOW (ref 34.5–45)
HGB BLD-MCNC: 7.7 G/DL — LOW (ref 11.5–15.5)
IANC: 10.74 K/UL — HIGH (ref 1.8–7.4)
IMM GRANULOCYTES NFR BLD AUTO: 1.4 % — HIGH (ref 0–0.9)
INR BLD: 1.25 RATIO — HIGH (ref 0.85–1.18)
LACTATE SERPL-SCNC: 1.3 MMOL/L — SIGNIFICANT CHANGE UP (ref 0.5–2)
LYMPHOCYTES # BLD AUTO: 0.61 K/UL — LOW (ref 1–3.3)
LYMPHOCYTES # BLD AUTO: 5.1 % — LOW (ref 13–44)
MAGNESIUM SERPL-MCNC: 1.9 MG/DL — SIGNIFICANT CHANGE UP (ref 1.6–2.6)
MCHC RBC-ENTMCNC: 26.1 PG — LOW (ref 27–34)
MCHC RBC-ENTMCNC: 32.5 GM/DL — SIGNIFICANT CHANGE UP (ref 32–36)
MCV RBC AUTO: 80.3 FL — SIGNIFICANT CHANGE UP (ref 80–100)
MONOCYTES # BLD AUTO: 0.38 K/UL — SIGNIFICANT CHANGE UP (ref 0–0.9)
MONOCYTES NFR BLD AUTO: 3.2 % — SIGNIFICANT CHANGE UP (ref 2–14)
NEUTROPHILS # BLD AUTO: 10.74 K/UL — HIGH (ref 1.8–7.4)
NEUTROPHILS NFR BLD AUTO: 89.9 % — HIGH (ref 43–77)
NON-GYNECOLOGICAL CYTOLOGY STUDY: SIGNIFICANT CHANGE UP
NRBC # BLD: 3 /100 WBCS — HIGH (ref 0–0)
NRBC # FLD: 0.31 K/UL — HIGH (ref 0–0)
ORGANISM # SPEC MICROSCOPIC CNT: SIGNIFICANT CHANGE UP
ORGANISM # SPEC MICROSCOPIC CNT: SIGNIFICANT CHANGE UP
PHOSPHATE SERPL-MCNC: 3.1 MG/DL — SIGNIFICANT CHANGE UP (ref 2.5–4.5)
PLATELET # BLD AUTO: 73 K/UL — LOW (ref 150–400)
POTASSIUM SERPL-MCNC: 3.7 MMOL/L — SIGNIFICANT CHANGE UP (ref 3.5–5.3)
POTASSIUM SERPL-SCNC: 3.7 MMOL/L — SIGNIFICANT CHANGE UP (ref 3.5–5.3)
PROCALCITONIN SERPL-MCNC: 3.11 NG/ML — HIGH (ref 0.02–0.1)
PROT SERPL-MCNC: 6.5 G/DL — SIGNIFICANT CHANGE UP (ref 6–8.3)
PROTHROM AB SERPL-ACNC: 14 SEC — HIGH (ref 9.5–13)
RBC # BLD: 2.95 M/UL — LOW (ref 3.8–5.2)
RBC # FLD: 15.7 % — HIGH (ref 10.3–14.5)
SODIUM SERPL-SCNC: 136 MMOL/L — SIGNIFICANT CHANGE UP (ref 135–145)
SPECIMEN SOURCE: SIGNIFICANT CHANGE UP
WBC # BLD: 11.95 K/UL — HIGH (ref 3.8–10.5)
WBC # FLD AUTO: 11.95 K/UL — HIGH (ref 3.8–10.5)

## 2023-09-27 PROCEDURE — 99232 SBSQ HOSP IP/OBS MODERATE 35: CPT | Mod: GC

## 2023-09-27 PROCEDURE — 99233 SBSQ HOSP IP/OBS HIGH 50: CPT

## 2023-09-27 RX ORDER — HEPARIN SODIUM 5000 [USP'U]/ML
5000 INJECTION INTRAVENOUS; SUBCUTANEOUS EVERY 8 HOURS
Refills: 0 | Status: DISCONTINUED | OUTPATIENT
Start: 2023-09-27 | End: 2023-09-29

## 2023-09-27 RX ADMIN — CHLORHEXIDINE GLUCONATE 1 APPLICATION(S): 213 SOLUTION TOPICAL at 05:22

## 2023-09-27 RX ADMIN — PANTOPRAZOLE SODIUM 40 MILLIGRAM(S): 20 TABLET, DELAYED RELEASE ORAL at 05:14

## 2023-09-27 RX ADMIN — Medication 650 MILLIGRAM(S): at 05:14

## 2023-09-27 RX ADMIN — PIPERACILLIN AND TAZOBACTAM 25 GRAM(S): 4; .5 INJECTION, POWDER, LYOPHILIZED, FOR SOLUTION INTRAVENOUS at 05:52

## 2023-09-27 RX ADMIN — Medication 650 MILLIGRAM(S): at 22:27

## 2023-09-27 RX ADMIN — HEPARIN SODIUM 5000 UNIT(S): 5000 INJECTION INTRAVENOUS; SUBCUTANEOUS at 22:28

## 2023-09-27 RX ADMIN — Medication 650 MILLIGRAM(S): at 18:02

## 2023-09-27 RX ADMIN — Medication 125 MICROGRAM(S): at 05:13

## 2023-09-27 RX ADMIN — PIPERACILLIN AND TAZOBACTAM 25 GRAM(S): 4; .5 INJECTION, POWDER, LYOPHILIZED, FOR SOLUTION INTRAVENOUS at 18:37

## 2023-09-27 NOTE — PROGRESS NOTE ADULT - ATTENDING COMMENTS
Pt. with oliguric ISAMAR on CKD, initiated on HD on 9/22/23. Pt. received HD in MICU yesterday. Pt. feels better. Assessment and plan for ISAMAR on CKD as outlined above. Plan for HD tomorrow. Monitor labs and urine output. Avoid any potential nephrotoxins. Dose medications as per eGFR.

## 2023-09-27 NOTE — PROGRESS NOTE ADULT - PROBLEM SELECTOR PLAN 3
--CT C/A/P w/ B/L GGO   --Started empirically on IV zosyn (9/24- likely 2/2 c/f possible PNA  --Will continue for 5-7 days  --Obtain procal  --Trend leukocytosis- suspect some component likely reactive

## 2023-09-27 NOTE — PROGRESS NOTE ADULT - PROBLEM SELECTOR PLAN 5
--Noted. Suspect 2/2 ischemia i/s/o shock  --Liver unremarkable on CTA A/P  --Avoid hepatotoxic agents  --Trend LFTs

## 2023-09-27 NOTE — PROGRESS NOTE ADULT - PROBLEM SELECTOR PLAN 1
Pt. with oliguric ISAMAR on CKD in setting of HF and fluid overload. On review of labs on [facility], Scr elevated at 3.31 on 8/30/23, increased to 3.92 on admission (9/20). Pt. initiated on IV Bumex infusion on 9/21. However, pt. remained anuric despite increase in IV Bumex infusion rate. Scr increased to 3.89 on 9/22. Pt. underwent right IJ non-tunneled HD catheter placement and received 1st HD treatment on 9/22. Pt. last received HD treatment yesterday (9/26) in [facility]. Pt. clinically stable. Labs reviewed. Pt. remains oliguric. No plan for HD treatment today. Monitor labs and urine output. Avoid any potential nephrotoxins. Dose medications as per eGFR.    If you have any questions, please feel free to contact me  Cody Mcguire  Nephrology Fellow  [phone] / [employer] Teams(Preferred)  (After 5pm or on weekends please page the on-call fellow) Pt. with oliguric ISAMAR on CKD in setting of HF and fluid overload. On review of labs on [facility], Scr elevated at 3.31 on 8/30/23, increased to 3.92 on admission (9/20). Pt. initiated on IV Bumex infusion on 9/21. However, pt. remained anuric despite increase in IV Bumex infusion rate. Scr increased to 3.89 on 9/22. Pt. underwent right IJ non-tunneled HD catheter placement, initiated on HD on 9/22/23. Pt. received HD treatment yesterday (9/26) in MICU. Pt. clinically stable. Labs reviewed. Pt. remains oliguric. No plan for HD treatment today. Plan for HD tomorrow. Monitor labs and urine output. Avoid any potential nephrotoxins. Dose medications as per eGFR.    If you have any questions, please feel free to contact me  Cody Mcguire  Nephrology Fellow  [phone] / [employer] Teams(Preferred)  (After 5pm or on weekends please page the on-call fellow)

## 2023-09-27 NOTE — PROGRESS NOTE ADULT - SUBJECTIVE AND OBJECTIVE BOX
Leah Cadena        Patient is a 58y old  Female who presents with a chief complaint of progressive dyspnea (27 Sep 2023 11:34)      SUBJECTIVE / OVERNIGHT EVENTS: No acute overnight events. This morning pt doing well. States SOB is much improved. No complaints, inquiring about dc. Denies fevers, chills, nausea, vomiting, chest pain, SOB.      MEDICATIONS  (STANDING):  chlorhexidine 2% Cloths 1 Application(s) Topical <User Schedule>  dextrose 5%. 1000 milliLiter(s) (50 mL/Hr) IV Continuous <Continuous>  dextrose 5%. 1000 milliLiter(s) (100 mL/Hr) IV Continuous <Continuous>  dextrose 50% Injectable 12.5 Gram(s) IV Push once  dextrose 50% Injectable 25 Gram(s) IV Push once  dextrose 50% Injectable 25 Gram(s) IV Push once  glucagon  Injectable 1 milliGRAM(s) IntraMuscular once  hydrocortisone sodium succinate Injectable 50 milliGRAM(s) IV Push once  insulin lispro (ADMELOG) corrective regimen sliding scale   SubCutaneous three times a day before meals  insulin lispro (ADMELOG) corrective regimen sliding scale   SubCutaneous at bedtime  levothyroxine 125 MICROGram(s) Oral daily  pantoprazole    Tablet 40 milliGRAM(s) Oral before breakfast  piperacillin/tazobactam IVPB.. 3.375 Gram(s) IV Intermittent every 12 hours  polyethylene glycol 3350 17 Gram(s) Oral daily  senna 2 Tablet(s) Oral at bedtime  sodium bicarbonate 650 milliGRAM(s) Oral three times a day    MEDICATIONS  (PRN):  dextrose Oral Gel 15 Gram(s) Oral once PRN Blood Glucose LESS THAN 70 milliGRAM(s)/deciliter  melatonin 3 milliGRAM(s) Oral at bedtime PRN Insomnia    Allergies    No Known Allergies    Intolerances        Vital Signs Last 24 Hrs  T(C): 36.4 (27 Sep 2023 11:43), Max: 36.8 (27 Sep 2023 08:00)  T(F): 97.6 (27 Sep 2023 11:43), Max: 98.2 (27 Sep 2023 08:00)  HR: 75 (27 Sep 2023 11:43) (66 - 76)  BP: 146/76 (27 Sep 2023 11:43) (128/71 - 150/71)  BP(mean): 97 (26 Sep 2023 21:00) (86 - 99)  RR: 17 (27 Sep 2023 11:43) (12 - 23)  SpO2: 100% (27 Sep 2023 11:43) (92% - 100%)    Parameters below as of 27 Sep 2023 11:43  Patient On (Oxygen Delivery Method): nasal cannula  O2 Flow (L/min): 1    Daily     Daily   CAPILLARY BLOOD GLUCOSE      POCT Blood Glucose.: 134 mg/dL (27 Sep 2023 11:56)  POCT Blood Glucose.: 133 mg/dL (27 Sep 2023 08:04)  POCT Blood Glucose.: 150 mg/dL (26 Sep 2023 20:46)  POCT Blood Glucose.: 97 mg/dL (26 Sep 2023 18:00)  POCT Blood Glucose.: 56 mg/dL (26 Sep 2023 17:26)  POCT Blood Glucose.: 58 mg/dL (26 Sep 2023 17:25)    I&O's Summary    26 Sep 2023 07:01  -  27 Sep 2023 07:00  --------------------------------------------------------  IN: 900 mL / OUT: 3635 mL / NET: -2735 mL        PHYSICAL EXAM:  GENERAL: NAD  HEENT:  Atraumatic, Normocephalic, EOMI, conjunctiva and sclera clear  NECK: Supple, R IJ Non tunneled cath (dressing c/d/i)  CHEST/LUNG: Clear to auscultation bilaterally; No wheeze, crackles or rhonchi   HEART: Regular rate and rhythm; Normal S1 S2, No murmurs, rubs, or gallops. +pericardial drain w/ SS fluid  ABDOMEN: Soft, Nontender, Nondistended; Bowel sounds present  EXTREMITIES:  2+ Peripheral Pulses, No edema  PSYCH: AAOx3  NEUROLOGY: non-focal  SKIN: Warm and dry    LABS:                        7.7    11.95 )-----------( 73       ( 27 Sep 2023 06:05 )             23.7     Hgb Trend: 7.7<--, 7.5<--, 7.7<--, 8.2<--, 6.3<--  09-27    136  |  97<L>  |  25<H>  ----------------------------<  138<H>  3.7   |  27  |  2.01<H>    Ca    8.5      27 Sep 2023 06:05  Phos  3.1     09-27  Mg     1.90     09-27    TPro  6.5  /  Alb  3.2<L>  /  TBili  0.9  /  DBili  x   /  AST  620<H>  /  ALT  476<H>  /  AlkPhos  879<H>  09-27    Creatinine Trend: 2.01<--, 2.10<--, 1.57<--, 2.91<--, 2.89<--, 2.58<--  LIVER FUNCTIONS - ( 27 Sep 2023 06:05 )  Alb: 3.2 g/dL / Pro: 6.5 g/dL / ALK PHOS: 879 U/L / ALT: 476 U/L / AST: 620 U/L / GGT: x           PT/INR - ( 27 Sep 2023 06:05 )   PT: 14.0 sec;   INR: 1.25 ratio         PTT - ( 26 Sep 2023 17:00 )  PTT:70.6 sec      Urinalysis Basic - ( 27 Sep 2023 06:05 )    Color: x / Appearance: x / SG: x / pH: x  Gluc: 138 mg/dL / Ketone: x  / Bili: x / Urobili: x   Blood: x / Protein: x / Nitrite: x   Leuk Esterase: x / RBC: x / WBC x   Sq Epi: x / Non Sq Epi: x / Bacteria: x        RADIOLOGY & ADDITIONAL TESTS:    Imaging Personally Reviewed.    Consultant(s) Notes Reviewed.    Care Discussed with Consultants/Other Providers.

## 2023-09-27 NOTE — PROGRESS NOTE ADULT - PROBLEM SELECTOR PLAN 2
--Pt previously w/ obstructive shock i/s/o pericardial effusion w/ tamponade.  --s/p pericardial drain placement  --Now resolved  --Monitor output,  can likely dc when output < 30cc /24hours  --Continue tele monitoring  --F/u cards recs

## 2023-09-27 NOTE — PROGRESS NOTE ADULT - PROBLEM SELECTOR PLAN 4
--Likely 2/2 anemia of chronic disease. w/o evidence of overt bleeding at this time.   --Anemia w/o c/w ACD  --Monitor Hgb  --Transfuse Hgb < 7

## 2023-09-27 NOTE — PROGRESS NOTE ADULT - PROBLEM SELECTOR PLAN 6
--Pressures stable off PO anti-Hypertensives   --Continue to hold for now, resume as tolerated  --Monitor pressures

## 2023-09-27 NOTE — PROGRESS NOTE ADULT - PROBLEM SELECTOR PLAN 1
--Likely multifactorial i/s/o acute on chronic HF +/- progressive renal failure +/- possible PNA. Lower suspicion for PE at this time  --TTE 08/23 w/ EF 55%, Mildly enlarged RV and Large pericardial effusion  --CT C/A/P w/ B/L GGO and mod B/L pleural effusions  --Pt oliguric. W/o sig improvement w/ Bumex gtt  --W/ Singh  --C/w HD for volume removal  --Unclear if pt possibly w/ ESRD, will discuss w/ renal  --Appreciate HF/Renal input  --Rest of mgt as below --Likely multifactorial i/s/o acute on chronic HF +/- progressive renal failure +/- possible PNA. Lower suspicion for PE at this time  --TTE 08/23 w/ EF 55%, Mildly enlarged RV and Large pericardial effusion  --CT C/A/P w/ B/L GGO and mod B/L pleural effusions  --Pt oliguric. W/o sig improvement w/ Bumex gtt  --W/ Singh- d/c for TOV. Bladder scan Q8H  --C/w HD for volume removal  --Unclear if pt possibly w/ ESRD, will discuss w/ renal  --Appreciate HF/Renal input  --Rest of mgt as below

## 2023-09-27 NOTE — PROGRESS NOTE ADULT - SUBJECTIVE AND OBJECTIVE BOX
[facility] Division of Kidney Diseases & Hypertension  FOLLOW UP NOTE  [phone]--------------------------------------------------------------------------------    Chief Complaint: ISAMAR on CKD, Fluid Overload    24 hour events/subjective: Pt. with ISAMAR on CKD, initiated on HD on 9/22/23. Pt. last received HD yesterday (9/26) in MICU. Pt. now transferred to medical floor. Pt. seen and evaluated at bedside this morning. Reports improvement in B/L LE edema and dyspnea. Denies any headaches, fevers/chills, chest pain, and abdominal pain.      PAST HISTORY  --------------------------------------------------------------------------------  No significant changes to PMH, PSH, FHx, SHx, unless otherwise noted    ALLERGIES & MEDICATIONS  --------------------------------------------------------------------------------  Allergies    No Known Allergies    Intolerances      Standing Inpatient Medications  chlorhexidine 2% Cloths 1 Application(s) Topical <User Schedule>  dextrose 5%. 1000 milliLiter(s) IV Continuous <Continuous>  dextrose 5%. 1000 milliLiter(s) IV Continuous <Continuous>  dextrose 50% Injectable 25 Gram(s) IV Push once  dextrose 50% Injectable 12.5 Gram(s) IV Push once  dextrose 50% Injectable 25 Gram(s) IV Push once  glucagon  Injectable 1 milliGRAM(s) IntraMuscular once  hydrocortisone sodium succinate Injectable 50 milliGRAM(s) IV Push once  insulin lispro (ADMELOG) corrective regimen sliding scale   SubCutaneous three times a day before meals  insulin lispro (ADMELOG) corrective regimen sliding scale   SubCutaneous at bedtime  levothyroxine 125 MICROGram(s) Oral daily  pantoprazole    Tablet 40 milliGRAM(s) Oral before breakfast  piperacillin/tazobactam IVPB.. 3.375 Gram(s) IV Intermittent every 12 hours  polyethylene glycol 3350 17 Gram(s) Oral daily  senna 2 Tablet(s) Oral at bedtime  sodium bicarbonate 650 milliGRAM(s) Oral three times a day    PRN Inpatient Medications  dextrose Oral Gel 15 Gram(s) Oral once PRN  melatonin 3 milliGRAM(s) Oral at bedtime PRN    REVIEW OF SYSTEMS  --------------------------------------------------------------------------------  Gen: No fever  Skin: No rash  Head/Eyes/Ears: No HA   Respiratory: +Decreased dyspnea  CV: No chest pain  GI: No abdominal pain, diarrhea  : +Decreased UOP  MSK: +Decreased B/L LE edema  Heme: No easy bruising or bleeding  Psych: No significant depression    All other systems were reviewed and are negative, except as noted.    VITALS/PHYSICAL EXAM  --------------------------------------------------------------------------------  T(C): 36.8 (09-27-23 @ 08:00), Max: 36.8 (09-27-23 @ 08:00)  HR: 76 (09-27-23 @ 08:00) (66 - 76)  BP: 142/67 (09-27-23 @ 08:00) (128/71 - 154/85)  RR: 17 (09-27-23 @ 08:00) (10 - 23)  SpO2: 98% (09-27-23 @ 08:00) (92% - 100%)  Wt(kg): --    09-26-23 @ 07:01  -  09-27-23 @ 07:00  --------------------------------------------------------  IN: 900 mL / OUT: 3635 mL / NET: -2735 mL    Physical Exam:  Gen: resting, NAD   HEENT: Receiving supplemental oxygen via nasal cannula  Pulm: CTA B/L  CV: S1S2+  Abd: Soft, +BS   Ext: No LE edema   Neuro: Awake and alert  Skin: Warm and dry  Vascular access: +Right IJ non-tunneled HD catheter    LABS/STUDIES  --------------------------------------------------------------------------------              7.7    11.95 >-----------<  73       [09-27-23 @ 06:05]              23.7     136  |  97  |  25  ----------------------------<  138      [09-27-23 @ 06:05]  3.7   |  27  |  2.01        Ca     8.5     [09-27-23 @ 06:05]      iCa    1.06     [09-26 @ 00:05]      Mg     1.90     [09-27-23 @ 06:05]      Phos  3.1     [09-27-23 @ 06:05]    TPro  6.5  /  Alb  3.2  /  TBili  0.9  /  DBili  x   /  AST  620  /  ALT  476  /  AlkPhos  879  [09-27-23 @ 06:05]    PT/INR: PT 14.0 , INR 1.25       [09-27-23 @ 06:05]  PTT: 70.6       [09-26-23 @ 17:00]    Creatinine Trend:  SCr 2.01 [09-27 @ 06:05]  SCr 2.10 [09-26 @ 00:05]  SCr 1.57 [09-25 @ 15:20]  SCr 2.91 [09-25 @ 00:25]  SCr 2.89 [09-24 @ 16:00]    TSH 4.58      [09-22-23 @ 08:04]    HBsAb <3.0      [09-22-23 @ 17:55]  HBsAg Nonreact      [09-22-23 @ 17:55]  HCV 0.10, Nonreact      [09-22-23 @ 17:55] [facility] Division of Kidney Diseases & Hypertension  FOLLOW UP NOTE  [phone]--------------------------------------------------------------------------------    Chief Complaint: ISAMAR on CKD, Fluid Overload    24 hour events/subjective: Pt. with ISAMAR on CKD, initiated on HD on 9/22/23. Pt. received HD yesterday (9/26) in MICU. Pt. now transferred to medical floor. Pt. seen and evaluated at bedside this morning. Reports improvement in B/L LE edema and dyspnea. Denies any headaches, fevers/chills, chest pain, and abdominal pain.    PAST HISTORY  --------------------------------------------------------------------------------  No significant changes to PMH, PSH, FHx, SHx, unless otherwise noted    ALLERGIES & MEDICATIONS  --------------------------------------------------------------------------------  Allergies    No Known Allergies    Intolerances    Standing Inpatient Medications  chlorhexidine 2% Cloths 1 Application(s) Topical <User Schedule>  dextrose 5%. 1000 milliLiter(s) IV Continuous <Continuous>  dextrose 5%. 1000 milliLiter(s) IV Continuous <Continuous>  dextrose 50% Injectable 25 Gram(s) IV Push once  dextrose 50% Injectable 12.5 Gram(s) IV Push once  dextrose 50% Injectable 25 Gram(s) IV Push once  glucagon  Injectable 1 milliGRAM(s) IntraMuscular once  hydrocortisone sodium succinate Injectable 50 milliGRAM(s) IV Push once  insulin lispro (ADMELOG) corrective regimen sliding scale   SubCutaneous three times a day before meals  insulin lispro (ADMELOG) corrective regimen sliding scale   SubCutaneous at bedtime  levothyroxine 125 MICROGram(s) Oral daily  pantoprazole    Tablet 40 milliGRAM(s) Oral before breakfast  piperacillin/tazobactam IVPB.. 3.375 Gram(s) IV Intermittent every 12 hours  polyethylene glycol 3350 17 Gram(s) Oral daily  senna 2 Tablet(s) Oral at bedtime  sodium bicarbonate 650 milliGRAM(s) Oral three times a day    PRN Inpatient Medications  dextrose Oral Gel 15 Gram(s) Oral once PRN  melatonin 3 milliGRAM(s) Oral at bedtime PRN    REVIEW OF SYSTEMS  --------------------------------------------------------------------------------  Gen: No fever  Skin: No rash  Head/Eyes/Ears: No HA   Respiratory: +Decreased dyspnea  CV: No chest pain  GI: No abdominal pain, diarrhea  : +Decreased UOP  MSK: +Decreased B/L LE edema  Heme: No easy bruising or bleeding  Psych: No significant depression    All other systems were reviewed and are negative, except as noted.    VITALS/PHYSICAL EXAM  --------------------------------------------------------------------------------  T(C): 36.8 (09-27-23 @ 08:00), Max: 36.8 (09-27-23 @ 08:00)  HR: 76 (09-27-23 @ 08:00) (66 - 76)  BP: 142/67 (09-27-23 @ 08:00) (128/71 - 154/85)  RR: 17 (09-27-23 @ 08:00) (10 - 23)  SpO2: 98% (09-27-23 @ 08:00) (92% - 100%)  Wt(kg): --    09-26-23 @ 07:01  -  09-27-23 @ 07:00  --------------------------------------------------------  IN: 900 mL / OUT: 3635 mL / NET: -2735 mL    Physical Exam:  Gen: resting, NAD   HEENT: Right IJ non-tunneled HD catheter: dressing seen  Pulm: CTA B/L  CV: S1S2+  Abd: Soft, +BS   Ext: No LE edema   Neuro: Awake and alert  Skin: Warm and dry  Vascular access: Right IJ non-tunneled HD catheter: dressing seen    LABS/STUDIES  --------------------------------------------------------------------------------              7.7    11.95 >-----------<  73       [09-27-23 @ 06:05]              23.7     136  |  97  |  25  ----------------------------<  138      [09-27-23 @ 06:05]  3.7   |  27  |  2.01        Ca     8.5     [09-27-23 @ 06:05]      iCa    1.06     [09-26 @ 00:05]      Mg     1.90     [09-27-23 @ 06:05]      Phos  3.1     [09-27-23 @ 06:05]    TPro  6.5  /  Alb  3.2  /  TBili  0.9  /  DBili  x   /  AST  620  /  ALT  476  /  AlkPhos  879  [09-27-23 @ 06:05]    Creatinine Trend:  SCr 2.01 [09-27 @ 06:05]  SCr 2.10 [09-26 @ 00:05]  SCr 1.57 [09-25 @ 15:20]  SCr 2.91 [09-25 @ 00:25]  SCr 2.89 [09-24 @ 16:00]    TSH 4.58      [09-22-23 @ 08:04]    HBsAb <3.0      [09-22-23 @ 17:55]  HBsAg Nonreact      [09-22-23 @ 17:55]  HCV 0.10, Nonreact      [09-22-23 @ 17:55]

## 2023-09-27 NOTE — PROGRESS NOTE ADULT - ASSESSMENT
58F with CKD, severe pHTN, HFpEF presented w/ SOB , admitted for AHRF likely 2/2  volume overload admitted to MICU for urgent HD w/ course c/b obstructive shock in the setting of pericardial tamponade now s/p pericardial drain placement, transferred to medicine for further management.

## 2023-09-28 LAB
ALBUMIN SERPL ELPH-MCNC: 3.2 G/DL — LOW (ref 3.3–5)
ALP SERPL-CCNC: 967 U/L — HIGH (ref 40–120)
ALT FLD-CCNC: 388 U/L — HIGH (ref 4–33)
ANION GAP SERPL CALC-SCNC: 16 MMOL/L — HIGH (ref 7–14)
AST SERPL-CCNC: 388 U/L — HIGH (ref 4–32)
BASOPHILS # BLD AUTO: 0.03 K/UL — SIGNIFICANT CHANGE UP (ref 0–0.2)
BASOPHILS NFR BLD AUTO: 0.3 % — SIGNIFICANT CHANGE UP (ref 0–2)
BILIRUB SERPL-MCNC: 1 MG/DL — SIGNIFICANT CHANGE UP (ref 0.2–1.2)
BUN SERPL-MCNC: 33 MG/DL — HIGH (ref 7–23)
CALCIUM SERPL-MCNC: 8.8 MG/DL — SIGNIFICANT CHANGE UP (ref 8.4–10.5)
CHLORIDE SERPL-SCNC: 92 MMOL/L — LOW (ref 98–107)
CO2 SERPL-SCNC: 27 MMOL/L — SIGNIFICANT CHANGE UP (ref 22–31)
CREAT SERPL-MCNC: 2.8 MG/DL — HIGH (ref 0.5–1.3)
EGFR: 19 ML/MIN/1.73M2 — LOW
EOSINOPHIL # BLD AUTO: 0.37 K/UL — SIGNIFICANT CHANGE UP (ref 0–0.5)
EOSINOPHIL NFR BLD AUTO: 3.3 % — SIGNIFICANT CHANGE UP (ref 0–6)
GLUCOSE BLDC GLUCOMTR-MCNC: 143 MG/DL — HIGH (ref 70–99)
GLUCOSE BLDC GLUCOMTR-MCNC: 144 MG/DL — HIGH (ref 70–99)
GLUCOSE BLDC GLUCOMTR-MCNC: 150 MG/DL — HIGH (ref 70–99)
GLUCOSE BLDC GLUCOMTR-MCNC: 238 MG/DL — HIGH (ref 70–99)
GLUCOSE BLDC GLUCOMTR-MCNC: 86 MG/DL — SIGNIFICANT CHANGE UP (ref 70–99)
GLUCOSE BLDC GLUCOMTR-MCNC: 95 MG/DL — SIGNIFICANT CHANGE UP (ref 70–99)
GLUCOSE SERPL-MCNC: 112 MG/DL — HIGH (ref 70–99)
HCT VFR BLD CALC: 27.4 % — LOW (ref 34.5–45)
HGB BLD-MCNC: 8.7 G/DL — LOW (ref 11.5–15.5)
IANC: 9.38 K/UL — HIGH (ref 1.8–7.4)
IMM GRANULOCYTES NFR BLD AUTO: 2.1 % — HIGH (ref 0–0.9)
LYMPHOCYTES # BLD AUTO: 0.82 K/UL — LOW (ref 1–3.3)
LYMPHOCYTES # BLD AUTO: 7.2 % — LOW (ref 13–44)
MAGNESIUM SERPL-MCNC: 1.9 MG/DL — SIGNIFICANT CHANGE UP (ref 1.6–2.6)
MCHC RBC-ENTMCNC: 26 PG — LOW (ref 27–34)
MCHC RBC-ENTMCNC: 31.8 GM/DL — LOW (ref 32–36)
MCV RBC AUTO: 82 FL — SIGNIFICANT CHANGE UP (ref 80–100)
MONOCYTES # BLD AUTO: 0.54 K/UL — SIGNIFICANT CHANGE UP (ref 0–0.9)
MONOCYTES NFR BLD AUTO: 4.7 % — SIGNIFICANT CHANGE UP (ref 2–14)
NEUTROPHILS # BLD AUTO: 9.38 K/UL — HIGH (ref 1.8–7.4)
NEUTROPHILS NFR BLD AUTO: 82.4 % — HIGH (ref 43–77)
NRBC # BLD: 3 /100 WBCS — HIGH (ref 0–0)
NRBC # FLD: 0.32 K/UL — HIGH (ref 0–0)
PHOSPHATE SERPL-MCNC: 3.1 MG/DL — SIGNIFICANT CHANGE UP (ref 2.5–4.5)
PLATELET # BLD AUTO: 80 K/UL — LOW (ref 150–400)
POTASSIUM SERPL-MCNC: 4.3 MMOL/L — SIGNIFICANT CHANGE UP (ref 3.5–5.3)
POTASSIUM SERPL-SCNC: 4.3 MMOL/L — SIGNIFICANT CHANGE UP (ref 3.5–5.3)
PROT SERPL-MCNC: 6.4 G/DL — SIGNIFICANT CHANGE UP (ref 6–8.3)
RBC # BLD: 3.34 M/UL — LOW (ref 3.8–5.2)
RBC # FLD: 15.9 % — HIGH (ref 10.3–14.5)
SODIUM SERPL-SCNC: 135 MMOL/L — SIGNIFICANT CHANGE UP (ref 135–145)
T3 SERPL-MCNC: 51 NG/DL — LOW (ref 80–200)
T4 FREE SERPL-MCNC: 1.4 NG/DL — SIGNIFICANT CHANGE UP (ref 0.9–1.8)
WBC # BLD: 11.38 K/UL — HIGH (ref 3.8–10.5)
WBC # FLD AUTO: 11.38 K/UL — HIGH (ref 3.8–10.5)

## 2023-09-28 PROCEDURE — 99233 SBSQ HOSP IP/OBS HIGH 50: CPT

## 2023-09-28 PROCEDURE — 90935 HEMODIALYSIS ONE EVALUATION: CPT

## 2023-09-28 RX ORDER — NIFEDIPINE 30 MG
30 TABLET, EXTENDED RELEASE 24 HR ORAL DAILY
Refills: 0 | Status: DISCONTINUED | OUTPATIENT
Start: 2023-09-28 | End: 2023-10-02

## 2023-09-28 RX ADMIN — PANTOPRAZOLE SODIUM 40 MILLIGRAM(S): 20 TABLET, DELAYED RELEASE ORAL at 05:58

## 2023-09-28 RX ADMIN — PIPERACILLIN AND TAZOBACTAM 25 GRAM(S): 4; .5 INJECTION, POWDER, LYOPHILIZED, FOR SOLUTION INTRAVENOUS at 17:31

## 2023-09-28 RX ADMIN — CHLORHEXIDINE GLUCONATE 1 APPLICATION(S): 213 SOLUTION TOPICAL at 05:58

## 2023-09-28 RX ADMIN — Medication 650 MILLIGRAM(S): at 05:57

## 2023-09-28 RX ADMIN — HEPARIN SODIUM 5000 UNIT(S): 5000 INJECTION INTRAVENOUS; SUBCUTANEOUS at 05:58

## 2023-09-28 RX ADMIN — Medication 30 MILLIGRAM(S): at 17:30

## 2023-09-28 RX ADMIN — PIPERACILLIN AND TAZOBACTAM 25 GRAM(S): 4; .5 INJECTION, POWDER, LYOPHILIZED, FOR SOLUTION INTRAVENOUS at 05:01

## 2023-09-28 RX ADMIN — Medication 125 MICROGRAM(S): at 05:00

## 2023-09-28 RX ADMIN — HEPARIN SODIUM 5000 UNIT(S): 5000 INJECTION INTRAVENOUS; SUBCUTANEOUS at 21:42

## 2023-09-28 RX ADMIN — HEPARIN SODIUM 5000 UNIT(S): 5000 INJECTION INTRAVENOUS; SUBCUTANEOUS at 14:59

## 2023-09-28 NOTE — PROGRESS NOTE ADULT - PROBLEM SELECTOR PLAN 1
--Likely multifactorial i/s/o acute on chronic HF +/- progressive renal failure +/- possible PNA. Lower suspicion for PE at this time  --TTE 08/23 w/ EF 55%, Mildly enlarged RV and Large pericardial effusion  --CT C/A/P w/ B/L GGO and mod B/L pleural effusions  --Pt oliguric. W/o sig improvement w/ Bumex gtt  --C/w HD for volume removal  --Dialysis dependent- will plan for tunneled cath placement per renal recs.   --Appreciate HF/Renal input  --Rest of mgt as below --Likely multifactorial i/s/o acute on chronic HF +/- progressive renal failure +/- possible PNA. Lower suspicion for PE at this time  --TTE 08/23 w/ EF 55%, Mildly enlarged RV and Large pericardial effusion  --CT C/A/P w/ B/L GGO and mod B/L pleural effusions  --Pt oliguric. W/o sig improvement w/ Bumex gtt  --C/w HD for volume removal  --Dialysis dependent- will plan for tunneled cath placement per renal recs. IR Consulted  --Appreciate HF/Renal input  --Rest of mgt as below

## 2023-09-28 NOTE — PROGRESS NOTE ADULT - SUBJECTIVE AND OBJECTIVE BOX
"Patient A&Ox4, walks with steady gait and appropriate to situation. Patient denies SI. Patient states he is \" anxious\" due to having his daughter taken away from him and that his ex girlfriend placed a restraining order against him. Pt reports he is unable to go back to his apartment because his ex girlfriend lives there. Patient states \" when I leave I will take an uber to my parents house\" ER MD notified.   " Leah Cadena        Patient is a 58y old  Female who presents with a chief complaint of progressive dyspnea (28 Sep 2023 11:31)      SUBJECTIVE / OVERNIGHT EVENTS: No acute overnight events. This morning pt doing well. No complaints this morning. Denies fevers, chills, nausea, vomiting, chest pain, SOB.     MEDICATIONS  (STANDING):  chlorhexidine 2% Cloths 1 Application(s) Topical <User Schedule>  dextrose 5%. 1000 milliLiter(s) (100 mL/Hr) IV Continuous <Continuous>  dextrose 5%. 1000 milliLiter(s) (50 mL/Hr) IV Continuous <Continuous>  dextrose 50% Injectable 25 Gram(s) IV Push once  dextrose 50% Injectable 25 Gram(s) IV Push once  dextrose 50% Injectable 12.5 Gram(s) IV Push once  glucagon  Injectable 1 milliGRAM(s) IntraMuscular once  heparin   Injectable 5000 Unit(s) SubCutaneous every 8 hours  insulin lispro (ADMELOG) corrective regimen sliding scale   SubCutaneous at bedtime  insulin lispro (ADMELOG) corrective regimen sliding scale   SubCutaneous three times a day before meals  levothyroxine 125 MICROGram(s) Oral daily  pantoprazole    Tablet 40 milliGRAM(s) Oral before breakfast  piperacillin/tazobactam IVPB.. 3.375 Gram(s) IV Intermittent every 12 hours  polyethylene glycol 3350 17 Gram(s) Oral daily  senna 2 Tablet(s) Oral at bedtime  sodium bicarbonate 650 milliGRAM(s) Oral three times a day    MEDICATIONS  (PRN):  dextrose Oral Gel 15 Gram(s) Oral once PRN Blood Glucose LESS THAN 70 milliGRAM(s)/deciliter  melatonin 3 milliGRAM(s) Oral at bedtime PRN Insomnia    Allergies    No Known Allergies    Intolerances        Vital Signs Last 24 Hrs  T(C): 36.6 (28 Sep 2023 10:15), Max: 36.6 (28 Sep 2023 06:45)  T(F): 97.9 (28 Sep 2023 10:15), Max: 97.9 (28 Sep 2023 06:45)  HR: 82 (28 Sep 2023 10:15) (75 - 86)  BP: 157/82 (28 Sep 2023 10:15) (136/70 - 164/86)  BP(mean): --  RR: 16 (28 Sep 2023 10:15) (14 - 17)  SpO2: 100% (28 Sep 2023 10:15) (99% - 100%)    Parameters below as of 28 Sep 2023 10:15  Patient On (Oxygen Delivery Method): nasal cannula  O2 Flow (L/min): 2    Daily     Daily Weight in k.6 (28 Sep 2023 10:00)  CAPILLARY BLOOD GLUCOSE      POCT Blood Glucose.: 150 mg/dL (28 Sep 2023 12:20)  POCT Blood Glucose.: 86 mg/dL (28 Sep 2023 10:30)  POCT Blood Glucose.: 95 mg/dL (28 Sep 2023 08:59)  POCT Blood Glucose.: 143 mg/dL (28 Sep 2023 05:52)  POCT Blood Glucose.: 180 mg/dL (27 Sep 2023 21:12)  POCT Blood Glucose.: 105 mg/dL (27 Sep 2023 17:34)    I&O's Summary    27 Sep 2023 07:01  -  28 Sep 2023 07:00  --------------------------------------------------------  IN: 1250 mL / OUT: 270 mL / NET: 980 mL    28 Sep 2023 07:01  -  28 Sep 2023 12:42  --------------------------------------------------------  IN: 500 mL / OUT: 2500 mL / NET: -2000 mL        PHYSICAL EXAM:    GENERAL: NAD  HEENT:  Atraumatic, Normocephalic, EOMI, conjunctiva and sclera clear  NECK: Supple, R IJ Non tunneled cath (dressing c/d/i)  CHEST/LUNG: Clear to auscultation bilaterally; No wheeze, crackles or rhonchi   HEART: Regular rate and rhythm; Normal S1 S2, No murmurs, rubs, or gallops. +pericardial drain w/ SS fluid  ABDOMEN: Soft, Nontender, Nondistended; Bowel sounds present  EXTREMITIES:  2+ Peripheral Pulses, No edema  PSYCH: AAOx3  NEUROLOGY: non-focal  SKIN: Warm and dry    LABS:                        8.7    11.38 )-----------( 80       ( 28 Sep 2023 04:45 )             27.4     Hgb Trend: 8.7<--, 7.7<--, 7.5<--, 7.7<--, 8.2<--      135  |  92<L>  |  33<H>  ----------------------------<  112<H>  4.3   |  27  |  2.80<H>    Ca    8.8      28 Sep 2023 04:45  Phos  3.1       Mg     1.90         TPro  6.4  /  Alb  3.2<L>  /  TBili  1.0  /  DBili  x   /  AST  388<H>  /  ALT  388<H>  /  AlkPhos  967<H>      Creatinine Trend: 2.80<--, 2.01<--, 2.10<--, 1.57<--, 2.91<--, 2.89<--  LIVER FUNCTIONS - ( 28 Sep 2023 04:45 )  Alb: 3.2 g/dL / Pro: 6.4 g/dL / ALK PHOS: 967 U/L / ALT: 388 U/L / AST: 388 U/L / GGT: x           PT/INR - ( 27 Sep 2023 06:05 )   PT: 14.0 sec;   INR: 1.25 ratio         PTT - ( 26 Sep 2023 17:00 )  PTT:70.6 sec      Urinalysis Basic - ( 28 Sep 2023 04:45 )    Color: x / Appearance: x / SG: x / pH: x  Gluc: 112 mg/dL / Ketone: x  / Bili: x / Urobili: x   Blood: x / Protein: x / Nitrite: x   Leuk Esterase: x / RBC: x / WBC x   Sq Epi: x / Non Sq Epi: x / Bacteria: x        RADIOLOGY & ADDITIONAL TESTS:    Imaging Personally Reviewed.    Consultant(s) Notes Reviewed.    Care Discussed with Consultants/Other Providers.

## 2023-09-28 NOTE — PROGRESS NOTE ADULT - ASSESSMENT
Pt. with ISAMAR on CKD, initiated on HD during current hospital stay. Pt. remains dialysis dependent. Pt. seen during HD today. /88 at time of HD rounds.

## 2023-09-28 NOTE — PROGRESS NOTE ADULT - PROBLEM SELECTOR PLAN 1
HD M-W-F  Strict I/O  Daily standing weights  Monitor lytes replete K>4.0 and Mg>2.0   Trend SCr  Management per primary team  Appreciate Nephrology recommendations   Pending final recommendations from HF attending HD M-W-F  Please restart Procardia XL 30mg daily (hold SBP<90)   Strict I/O  Daily standing weights  Monitor lytes replete K>4.0 and Mg>2.0   Trend SCr  Please order TTE to evaluate pericardial effusion s/p drain removal  Please order CXR follow up on worsening pleural effusion      Management per primary team  Appreciate Nephrology recommendations   Pending final recommendations from HF attending

## 2023-09-28 NOTE — PROGRESS NOTE ADULT - ASSESSMENT
58 year old Female with PMH of  HTN, HLD, DM2, asthma, CKD (baseline SCr ~2.0), hypothyroidism c/b myxedema coma in past), anemia requiring recent transfusions (followed by GI with plan for scope), and HFpEF with severe pulmonary hypertension (EF 64%;LVIDd 4.6 RVE with normal RV function, mod-severe TR and severe PH RVSP~65). Patient presented with c/o progressive worsening SOB/MARKS/PND and 3 pillow orthopnea X 5days; treated in the ED with IV Lasix 40mg followed by IV Bumex 2mg BID.      9/22- Patient with worsening SCr and signs of uremia requiring emergency HD and transfer to MICU. Subsequently, she became hypotensive requiring pressor support.     Pertinent Labs: BNP  1819     Lactate 1.0    Troponin levels    64/65    BUN/Cr  110/3.92   Na 122    TSH: T3/T4  Pending             K/L  (1/2023)  8.9/3.75 ratio 2.35  CXR: Pulmonary edema with small bilateral pleural effusions  EKG:  Normal sinus rhythm, possible Left atrial enlargement  TTE: Normal LV systolic function, Right ventricular enlargement with normal RV systolic function, Moderate-severe TR and PASP equals 65 consistent with PH.  RHC (8/23):  RA 17 PA 51/22/36  PCWP  25  CO/CI 6.7/4.2     and  PVR 2.6 POWERS        58 year old Female with PMH of  HTN, HLD, DM2, asthma, CKD (baseline SCr ~2.0), hypothyroidism c/b myxedema coma in past), anemia requiring recent transfusions (followed by GI with plan for scope), and HFpEF with severe pulmonary hypertension (EF 64%;LVIDd 4.6 RVE with normal RV function, mod-severe TR and severe PH RVSP~65). Patient presented with c/o progressive worsening SOB/MARKS/PND and 3 pillow orthopnea X 5days; treated in the ED with IV Lasix 40mg followed by IV Bumex 2mg BID.      9/22- Patient with worsening SCr and signs of uremia requiring emergency HD and transfer to MICU. Subsequently, she became hypotensive requiring pressor support.     Pertinent Labs: BNP  1819     Lactate 1.0    Troponin levels    64/65    BUN/Cr  110/3.92   Na 122    TSH: T3/T4  Pending             K/L  (1/2023)  8.9/3.75 ratio 2.35  CXR: Pulmonary edema with small bilateral pleural effusions  EKG:  Normal sinus rhythm, possible Left atrial enlargement  TTE: Normal LV systolic function, Right ventricular enlargement with normal RV systolic function, Moderate-severe TR and PASP equals 65 consistent with PH.  RHC (8/23):  RA 17 PA 51/22/36  PCWP  25  CO/CI 6.7/4.2     and  PVR 2.6 POWERS       9/25 TTE revealed large pericardial effusion, underwent pericardiocentesis with sanguinous output; drain removed on .   9/25 TTE revealed trace pericardial effusion,

## 2023-09-28 NOTE — PROGRESS NOTE ADULT - SUBJECTIVE AND OBJECTIVE BOX
[facility] DIVISION OF KIDNEY DISEASES AND HYPERTENSION --   --------------------------------------------------------------------------------  Chief Complaint: ISAMAR on CKD/Ongoing hemodialysis requirement    24 hour events/subjective: Pt. with oliguric ISAMAR on CKD, initiated on HD on 9/22/23. Pt. seen and examined during HD today. Pt. feels better. No fever, CP, SOB, HA or dizziness during HD rounds.      PAST HISTORY  --------------------------------------------------------------------------------  No significant changes to PMH, PSH, FHx, SHx, unless otherwise noted    ALLERGIES & MEDICATIONS  --------------------------------------------------------------------------------  Allergies    No Known Allergies    Intolerances    Standing Inpatient Medications  chlorhexidine 2% Cloths 1 Application(s) Topical <User Schedule>  dextrose 5%. 1000 milliLiter(s) IV Continuous <Continuous>  dextrose 5%. 1000 milliLiter(s) IV Continuous <Continuous>  dextrose 50% Injectable 25 Gram(s) IV Push once  dextrose 50% Injectable 12.5 Gram(s) IV Push once  dextrose 50% Injectable 25 Gram(s) IV Push once  glucagon  Injectable 1 milliGRAM(s) IntraMuscular once  heparin   Injectable 5000 Unit(s) SubCutaneous every 8 hours  insulin lispro (ADMELOG) corrective regimen sliding scale   SubCutaneous three times a day before meals  insulin lispro (ADMELOG) corrective regimen sliding scale   SubCutaneous at bedtime  levothyroxine 125 MICROGram(s) Oral daily  pantoprazole    Tablet 40 milliGRAM(s) Oral before breakfast  piperacillin/tazobactam IVPB.. 3.375 Gram(s) IV Intermittent every 12 hours  polyethylene glycol 3350 17 Gram(s) Oral daily  senna 2 Tablet(s) Oral at bedtime  sodium bicarbonate 650 milliGRAM(s) Oral three times a day    PRN Inpatient Medications  dextrose Oral Gel 15 Gram(s) Oral once PRN  melatonin 3 milliGRAM(s) Oral at bedtime PRN    REVIEW OF SYSTEMS  --------------------------------------------------------------------------------  Gen: No fever  Respiratory: No dyspnea  CV: No chest pain  GI: No abdominal pain  MSK: No edema  Neuro: No dizziness    VITALS/PHYSICAL EXAM  --------------------------------------------------------------------------------  T(C): 36 (09-28-23 @ 10:00), Max: 36.6 (09-28-23 @ 06:45)  HR: 80 (09-28-23 @ 10:00) (75 - 86)  BP: 136/70 (09-28-23 @ 10:00) (136/70 - 164/86)  RR: 16 (09-28-23 @ 10:00) (14 - 17)  SpO2: 100% (09-28-23 @ 04:00) (99% - 100%)  Wt(kg): --    09-27-23 @ 07:01  -  09-28-23 @ 07:00  --------------------------------------------------------  IN: 1250 mL / OUT: 270 mL / NET: 980 mL    09-28-23 @ 07:01  -  09-28-23 @ 10:54  --------------------------------------------------------  IN: 500 mL / OUT: 2500 mL / NET: -2000 mL    Physical Exam:    Gen: resting, NAD   HEENT: Right IJ non-tunneled HD catheter+  Pulm: CTA B/L  CV: S1S2+, +pericardial drain with minimal bloody fluid seen  Abd: Soft, +BS   Ext: No LE edema   Neuro: Awake and alert  Skin: Warm and dry  Vascular access: Right IJ non-tunneled HD catheter being used for HD     LABS/STUDIES  --------------------------------------------------------------------------------              8.7    11.38 >-----------<  80       [09-28-23 @ 04:45]              27.4     135  |  92  |  33  ----------------------------<  112      [09-28-23 @ 04:45]  4.3   |  27  |  2.80        Ca     8.8     [09-28-23 @ 04:45]      Mg     1.90     [09-28-23 @ 04:45]      Phos  3.1     [09-28-23 @ 04:45]    TPro  6.4  /  Alb  3.2  /  TBili  1.0  /  DBili  x   /  AST  388  /  ALT  388  /  AlkPhos  967  [09-28-23 @ 04:45]    HBsAb <3.0      [09-22-23 @ 17:55]  HBsAg Nonreact      [09-22-23 @ 17:55]  HCV 0.10, Nonreact      [09-22-23 @ 17:55]

## 2023-09-28 NOTE — CHART NOTE - NSCHARTNOTEFT_GEN_A_CORE
Pericardial drain removed at bedside today  Suture cut and removed, drain pulled without issue, pressure held over insertion site for 1 min, no oozing noted afterwards  Gauze and transparent dressing secured in place  Pt advised to notify primary team if any bleeding/pain around prior drain site    Patti PGY5  Cardiology Fellow    NETTA Benitez

## 2023-09-28 NOTE — CONSULT NOTE ADULT - ASSESSMENT
Interventional Radiology    Evaluate for Procedure: Tunneled line placement     HPI: 58F with CKD, severe pHTN, HFpEF presented w/ SOB , admitted for AHRF likely 2/2  volume overload admitted to MICU for urgent HD w/ course c/b obstructive shock in the setting of pericardial tamponade now s/p pericardial drain placement, transferred to medicine for further management.    Allergies: No Known Allergies    Medications (Abx/Cardiac/Anticoagulation/Blood Products)    heparin   Injectable: 5000 Unit(s) SubCutaneous (09-28 @ 14:59)  NIFEdipine XL: 30 milliGRAM(s) Oral (09-28 @ 17:30)  piperacillin/tazobactam IVPB..: 25 mL/Hr IV Intermittent (09-28 @ 17:31)    Data:    T(C): 36.4  HR: 80  BP: 176/84  RR: 18  SpO2: 100%    -WBC 11.38 / HgB 8.7 / Hct 27.4 / Plt 80  -Na 135 / Cl 92 / BUN 33 / Glucose 112  -K 4.3 / CO2 27 / Cr 2.80  - / Alk Phos 967 / T.Bili 1.0  -INR 1.25 / PTT --      Radiology:   - relevant imaging reviewed     Assessment/Plan:   58F with ISAMAR on CKD presenting with sob admit with AHRF iso volume overload and PNA, currently on Zosyn. Requiring long term HD, currently receiving via nontunneled HD cath placed on floor.     - Pt currently being treated for PNA with Zosyn, positive Bcx 9/24 GPR in anaerobic bottle.   - Plan for placement of tunneled line early next week pending resolution of infection. Please reconsult IR for placement once pt's PNA resolves and have negative blood cultures.      --  Shar Carter MD, PGY-3  Vascular and Interventional Radiology   Available on Microsoft Teams    - Non-emergent consults: Place IR consult order in [place]  - Emergent issues (pager): [facility] [phone]; [facility] [phone]; [phone]  - Scheduling questions: [facility] [phone]; [facility] [phone]  - Clinic/outpatient booking: [facility] [phone]; [facility] [phone] Interventional Radiology    Evaluate for Procedure: Tunneled HD catheter placement     HPI: 58F with CKD, severe pHTN, HFpEF presented w/ SOB , admitted for AHRF likely 2/2  volume overload admitted to MICU for urgent HD w/ course c/b obstructive shock in the setting of pericardial tamponade now s/p pericardial drain placement, transferred to medicine for further management.    Allergies: No Known Allergies    Medications (Abx/Cardiac/Anticoagulation/Blood Products)    heparin   Injectable: 5000 Unit(s) SubCutaneous (09-28 @ 14:59)  NIFEdipine XL: 30 milliGRAM(s) Oral (09-28 @ 17:30)  piperacillin/tazobactam IVPB..: 25 mL/Hr IV Intermittent (09-28 @ 17:31)    Data:    T(C): 36.4  HR: 80  BP: 176/84  RR: 18  SpO2: 100%    -WBC 11.38 / HgB 8.7 / Hct 27.4 / Plt 80  -Na 135 / Cl 92 / BUN 33 / Glucose 112  -K 4.3 / CO2 27 / Cr 2.80  - / Alk Phos 967 / T.Bili 1.0  -INR 1.25 / PTT --      Radiology:   - relevant imaging reviewed     Assessment/Plan:   58F with ISAMAR on CKD presenting with sob admit with AHRF iso volume overload and PNA, currently on Zosyn. Requiring long term HD, currently receiving via nontunneled HD cath placed on floor.     - Pt currently being treated for PNA with Zosyn, positive Bcx 9/24 GPR in anaerobic bottle.   - Plan for placement of tunneled HD catheter early next week pending resolution of infection. Please reconsult IR for placement once have negative blood cultures.      --  Shar Carter MD, PGY-3  Vascular and Interventional Radiology   Available on Microsoft Teams    - Non-emergent consults: Place IR consult order in [place]  - Emergent issues (pager): [facility] [phone]; [facility] [phone]; [phone]  - Scheduling questions: [facility] [phone]; [facility] [phone]  - Clinic/outpatient booking: [facility] [phone]; [facility] [phone]

## 2023-09-28 NOTE — PROGRESS NOTE ADULT - PROBLEM SELECTOR PLAN 1
Pt. tolerating HD. BP elevated during HD rounds. Continue with current UF goal as tolerated. HD catheter functioning well. Pt. remains dialysis dependent, recommend tunneled HD catheter placement by IR. Discontinue oral sodium bicarbonate. Monitor BP and labs.

## 2023-09-28 NOTE — PROGRESS NOTE ADULT - SUBJECTIVE AND OBJECTIVE BOX
Interval History:  Patient resting comfortably in bed   Denies CP/SOB/palpitations/dizziness  No acute events overnight    Medications:  chlorhexidine 2% Cloths 1 Application(s) Topical <User Schedule>  dextrose 5%. 1000 milliLiter(s) IV Continuous <Continuous>  dextrose 5%. 1000 milliLiter(s) IV Continuous <Continuous>  dextrose 50% Injectable 25 Gram(s) IV Push once  dextrose 50% Injectable 25 Gram(s) IV Push once  dextrose 50% Injectable 12.5 Gram(s) IV Push once  dextrose Oral Gel 15 Gram(s) Oral once PRN  glucagon  Injectable 1 milliGRAM(s) IntraMuscular once  heparin   Injectable 5000 Unit(s) SubCutaneous every 8 hours  insulin lispro (ADMELOG) corrective regimen sliding scale   SubCutaneous three times a day before meals  insulin lispro (ADMELOG) corrective regimen sliding scale   SubCutaneous at bedtime  levothyroxine 125 MICROGram(s) Oral daily  melatonin 3 milliGRAM(s) Oral at bedtime PRN  pantoprazole    Tablet 40 milliGRAM(s) Oral before breakfast  piperacillin/tazobactam IVPB.. 3.375 Gram(s) IV Intermittent every 12 hours  polyethylene glycol 3350 17 Gram(s) Oral daily  senna 2 Tablet(s) Oral at bedtime  sodium bicarbonate 650 milliGRAM(s) Oral three times a day      Vitals:  T(C): 36.6 (23 @ 10:15), Max: 36.6 (23 @ 06:45)  HR: 82 (23 @ 10:15) (75 - 86)  BP: 157/82 (23 @ 10:15) (136/70 - 164/86)  BP(mean): --  RR: 16 (23 @ 10:15) (14 - 17)  SpO2: 100% (23 @ 10:15) (99% - 100%)    Daily     Daily Weight in k.6 (28 Sep 2023 10:00)        I&O's Summary    27 Sep 2023 07:01  -  28 Sep 2023 07:00  --------------------------------------------------------  IN: 1250 mL / OUT: 270 mL / NET: 980 mL    28 Sep 2023 07:01  -  28 Sep 2023 11:31  --------------------------------------------------------  IN: 500 mL / OUT: 2500 mL / NET: -2000 mL        Physical Exam:  Appearance: No Acute Distress  Neck: JVP  Cardiovascular: Normal S1 S2  Respiratory: Clear to auscultation bilaterally  Gastrointestinal: Soft, Non-tender	  Skin: No cyanosis	  Neurologic: Non-focal  Extremities: No LE edema  Psychiatry: A & O x 3    Labs:                        8.7    11.38 )-----------( 80       ( 28 Sep 2023 04:45 )             27.4         135  |  92<L>  |  33<H>  ----------------------------<  112<H>  4.3   |  27  |  2.80<H>    Ca    8.8      28 Sep 2023 04:45  Phos  3.1       Mg     1.90         TPro  6.4  /  Alb  3.2<L>  /  TBili  1.0  /  DBili  x   /  AST  388<H>  /  ALT  388<H>  /  AlkPhos  967<H>      PT/INR - ( 27 Sep 2023 06:05 )   PT: 14.0 sec;   INR: 1.25 ratio         PTT - ( 26 Sep 2023 17:00 )  PTT:70.6 sec        TELEMETRY:    Echocardiogram:  TTE W or WO Ultrasound Enhancing Agent (23 @ 07:51)      CONCLUSIONS:      1. Left ventricular systolic function is normal with an ejection fraction of 55 % by Macedo's method of disks.   2. Based on visual assessment, the right ventricle appears mildly enlarged. reduced systolic function.   3. Borderline left ventricular hypertrophy.   4. Large pericardial effusion.   5. There is a large circumferential pericardial effusion measuring 1.8 cm posterior to the left ventricle in the parasternal view and appearsto measure ~1.5 cm anterior to the right ventricle in the subcostal view. The right ventricle does not appear to completely expand during diastole however there is no overt diastolic collapse. There is 23% respirophasic variation across the mitral valve. These findings are consistent with increased intrapericardial pressure however there is no evidence of overt tamponade physiology.    ________________________________________________________________________________________  FINDINGS:     Left Ventricle:  Left ventricular wall thickness is mildly increased. Left ventricular systolic function is normal with a calculated ejection fraction of 55 % by the Macedo's biplane method of disks. Borderline left ventricular hypertrophy.     Right Ventricle:  Based on visual assessment, the right ventricle appears mildly enlarged. Reduced systolic function. Tricuspid annular plane systolic excursion (TAPSE) is 1.3 cm (normal >=1.7 cm).     Aortic Valve:  The aortic valve appears trileaflet. There is calcification of the aortic valve leaflets. There is no aortic stenosis.     Tricuspid Valve:  There is trace tricuspid regurgitation.     Pulmonic Valve:  There is trace pulmonic regurgitation.     Pericardium:  There is a large circumferential pericardial effusion measuring 1.8 cm posterior to the left ventricle in the parasternal view and appears to measure ~1.5 cm anterior to the right ventricle in the subcostal view. The right ventricle does not appear to completely expand during diastole however there is no overt diastolic collapse. There is 23% respirophasic variation across the mitral valve. These findings are consistent with increased intrapericardial pressure however there is no evidence of overt tamponade physiology.    Pleura:  Large left pleural effusion noted.  ____________________________________________________________________  Quantitative Data:  Left Ventricle Measurements: (Indexed to BSA)     IVSd (2D):   1.3 cm  LVPWd (2D):  1.2 cm  LVIDd (2D):  3.6 cm  LVIDs (2D):  2.5cm  LV Mass:     153 g  95.1 g/m²  LV Vol d, MOD A2C: 36.9 ml 22.96 ml/m²  LV Vol d, MOD A4C: 44.2 ml 27.50 ml/m²  LV Vol d, MOD BP:  40.3 ml 25.07 ml/m²  LV Vol s, MOD A2C: 16.6 ml 10.33 ml/m²  LV Vol s, MOD A4C: 19.9 ml 12.38 ml/m²  LV Vol s, MOD BP:  18.1 ml 11.27 ml/m²  LVOT SV MOD BP:    22.2 ml  LV EF% MOD BP:     55 %     MV E Vmax:    0.81 m/s  MV A Vmax:    0.78 m/s  MV E/A:       1.04  e' lateral:   7.29 cm/s  e' medial:    8.27 cm/s  E/e' lateral: 11.07  E/e' medial:  9.76  E/e' Average: 10.37  MV DT:        230 msec    Aorta Measurements: (normal range) (Indexed to BSA)     Sinuses of Valsalva: 2.70 cm (2.7 - 3.3 cm)       Left Atrium Measurements: (Indexed to BSA)  LA Diam 2D: 3.30 cm    Right Ventricle Measurements:     TAPSE:        1.3 cm  TV Shanita. S':      9.36 cm/s  RV Base (RVID1): 3.6 cm       LVOT / RVOT/ Qp/Qs Data: (Indexed to BSA)  LVOT Diameter: 1.40 cm  LVOT Vmax:     1.61 m/s  LVOT VTI:      25.90 cm  LVOT SV:       39.9 ml  24.81 ml/m²    Aortic Valve Measurements:  AV Vmax:           2.3 m/s  AV Peak Gradient:  21.7 mmHg  AV Mean Gradient:  12.0 mmHg  AV VTI:            43.8 cm  AV VTI Ratio:      0.59  AoV EOA, Contin:   0.91 cm²  AoV EOA, Contin i: 0.57 cm²/m²    Mitral Valve Measurements:     MV Vmax:     0.95 m/s  MV Mean Grad: 1.00 mmHg  MV Peak Grad: 3.6 mmHg  MV E Vmax:    0.8 m/s  MV A Vmax:    0.8 m/s  MV E/A:       1.0       Tricuspid Valve Measurements:     TR Vmax:          2.3 m/s  TR Peak Gradient: 21.0 mmHg        TTE Limited W or WO Ultrasound Enhancing Agent (23 @ 13:39)      CONCLUSIONS:      1. Left ventricular systolic function is normal with an ejection fraction visually estimated at 60 to 65 %.   2. Left ventricle was not well visualized.   3. Probably normal systolic function.   4. There is no echocardiographic evidence of tamponade on this study.  5. Trace pericardial effusion.    ________________________________________________________________________________________  FINDINGS:     Left Ventricle:  The left ventricle was not well visualized. Left ventricular systolic function is normal with an ejection fraction visually estimated at 60 to 65%.     Right Ventricle:  The right ventricular cavity is normal in size and probably normal systolic function.     Pericardium:  There is a trace pericardial effusion.     Pleura:  Moderate left pleural effusion noted.  ____________________________________________________________________  Quantitative Data:  Left Ventricle Measurements: (Indexed to BSA)     Visualized LV EF%: 60 to 65%       LVOT / RVOT/ Qp/Qs Data: (Indexed to BSA)       Interval History:  Patient resting comfortably in bed   Denies CP/SOB/palpitations/dizziness  No acute events overnight    Medications:  chlorhexidine 2% Cloths 1 Application(s) Topical <User Schedule>  dextrose 5%. 1000 milliLiter(s) IV Continuous <Continuous>  dextrose 5%. 1000 milliLiter(s) IV Continuous <Continuous>  dextrose 50% Injectable 25 Gram(s) IV Push once  dextrose 50% Injectable 25 Gram(s) IV Push once  dextrose 50% Injectable 12.5 Gram(s) IV Push once  dextrose Oral Gel 15 Gram(s) Oral once PRN  glucagon  Injectable 1 milliGRAM(s) IntraMuscular once  heparin   Injectable 5000 Unit(s) SubCutaneous every 8 hours  insulin lispro (ADMELOG) corrective regimen sliding scale   SubCutaneous three times a day before meals  insulin lispro (ADMELOG) corrective regimen sliding scale   SubCutaneous at bedtime  levothyroxine 125 MICROGram(s) Oral daily  melatonin 3 milliGRAM(s) Oral at bedtime PRN  pantoprazole    Tablet 40 milliGRAM(s) Oral before breakfast  piperacillin/tazobactam IVPB.. 3.375 Gram(s) IV Intermittent every 12 hours  polyethylene glycol 3350 17 Gram(s) Oral daily  senna 2 Tablet(s) Oral at bedtime  sodium bicarbonate 650 milliGRAM(s) Oral three times a day      Vitals:  T(C): 36.6 (23 @ 10:15), Max: 36.6 (23 @ 06:45)  HR: 82 (23 @ 10:15) (75 - 86)  BP: 157/82 (23 @ 10:15) (136/70 - 164/86)  BP(mean): --  RR: 16 (23 @ 10:15) (14 - 17)  SpO2: 100% (23 @ 10:15) (99% - 100%)    Daily     Daily Weight in k.6 (28 Sep 2023 10:00)        I&O's Summary    27 Sep 2023 07:01  -  28 Sep 2023 07:00  --------------------------------------------------------  IN: 1250 mL / OUT: 270 mL / NET: 980 mL    28 Sep 2023 07:01  -  28 Sep 2023 11:31  --------------------------------------------------------  IN: 500 mL / OUT: 2500 mL / NET: -2000 mL        Physical Exam:  Appearance: No Acute Distress  Neck: JVP~8  Cardiovascular: Normal S1 S2  Respiratory: Clear to auscultation fine bibasilar rales with diminished breath sounds LLL  Gastrointestinal: Soft, Non-tender	  Skin: No cyanosis	  Neurologic: Non-focal  Extremities: No LE edema  Psychiatry: A & O x 3    Labs:                        8.7    11.38 )-----------( 80       ( 28 Sep 2023 04:45 )             27.4         135  |  92<L>  |  33<H>  ----------------------------<  112<H>  4.3   |  27  |  2.80<H>    Ca    8.8      28 Sep 2023 04:45  Phos  3.1       Mg     1.90         TPro  6.4  /  Alb  3.2<L>  /  TBili  1.0  /  DBili  x   /  AST  388<H>  /  ALT  388<H>  /  AlkPhos  967<H>      PT/INR - ( 27 Sep 2023 06:05 )   PT: 14.0 sec;   INR: 1.25 ratio         PTT - ( 26 Sep 2023 17:00 )  PTT:70.6 sec        TELEMETRY: Sinus Rhythm     Echocardiogram:  TTE W or WO Ultrasound Enhancing Agent (23 @ 07:51)      CONCLUSIONS:      1. Left ventricular systolic function is normal with an ejection fraction of 55 % by Macedo's method of disks.   2. Based on visual assessment, the right ventricle appears mildly enlarged. reduced systolic function.   3. Borderline left ventricular hypertrophy.   4. Large pericardial effusion.   5. There is a large circumferential pericardial effusion measuring 1.8 cm posterior to the left ventricle in the parasternal view and appearsto measure ~1.5 cm anterior to the right ventricle in the subcostal view. The right ventricle does not appear to completely expand during diastole however there is no overt diastolic collapse. There is 23% respirophasic variation across the mitral valve. These findings are consistent with increased intrapericardial pressure however there is no evidence of overt tamponade physiology.    ________________________________________________________________________________________  FINDINGS:     Left Ventricle:  Left ventricular wall thickness is mildly increased. Left ventricular systolic function is normal with a calculated ejection fraction of 55 % by the Macedo's biplane method of disks. Borderline left ventricular hypertrophy.     Right Ventricle:  Based on visual assessment, the right ventricle appears mildly enlarged. Reduced systolic function. Tricuspid annular plane systolic excursion (TAPSE) is 1.3 cm (normal >=1.7 cm).     Aortic Valve:  The aortic valve appears trileaflet. There is calcification of the aortic valve leaflets. There is no aortic stenosis.     Tricuspid Valve:  There is trace tricuspid regurgitation.     Pulmonic Valve:  There is trace pulmonic regurgitation.     Pericardium:  There is a large circumferential pericardial effusion measuring 1.8 cm posterior to the left ventricle in the parasternal view and appears to measure ~1.5 cm anterior to the right ventricle in the subcostal view. The right ventricle does not appear to completely expand during diastole however there is no overt diastolic collapse. There is 23% respirophasic variation across the mitral valve. These findings are consistent with increased intrapericardial pressure however there is no evidence of overt tamponade physiology.    Pleura:  Large left pleural effusion noted.  ____________________________________________________________________  Quantitative Data:  Left Ventricle Measurements: (Indexed to BSA)     IVSd (2D):   1.3 cm  LVPWd (2D):  1.2 cm  LVIDd (2D):  3.6 cm  LVIDs (2D):  2.5cm  LV Mass:     153 g  95.1 g/m²  LV Vol d, MOD A2C: 36.9 ml 22.96 ml/m²  LV Vol d, MOD A4C: 44.2 ml 27.50 ml/m²  LV Vol d, MOD BP:  40.3 ml 25.07 ml/m²  LV Vol s, MOD A2C: 16.6 ml 10.33 ml/m²  LV Vol s, MOD A4C: 19.9 ml 12.38 ml/m²  LV Vol s, MOD BP:  18.1 ml 11.27 ml/m²  LVOT SV MOD BP:    22.2 ml  LV EF% MOD BP:     55 %     MV E Vmax:    0.81 m/s  MV A Vmax:    0.78 m/s  MV E/A:       1.04  e' lateral:   7.29 cm/s  e' medial:    8.27 cm/s  E/e' lateral: 11.07  E/e' medial:  9.76  E/e' Average: 10.37  MV DT:        230 msec    Aorta Measurements: (normal range) (Indexed to BSA)     Sinuses of Valsalva: 2.70 cm (2.7 - 3.3 cm)       Left Atrium Measurements: (Indexed to BSA)  LA Diam 2D: 3.30 cm    Right Ventricle Measurements:     TAPSE:        1.3 cm  TV Shanita. S':      9.36 cm/s  RV Base (RVID1): 3.6 cm       LVOT / RVOT/ Qp/Qs Data: (Indexed to BSA)  LVOT Diameter: 1.40 cm  LVOT Vmax:     1.61 m/s  LVOT VTI:      25.90 cm  LVOT SV:       39.9 ml  24.81 ml/m²    Aortic Valve Measurements:  AV Vmax:           2.3 m/s  AV Peak Gradient:  21.7 mmHg  AV Mean Gradient:  12.0 mmHg  AV VTI:            43.8 cm  AV VTI Ratio:      0.59  AoV EOA, Contin:   0.91 cm²  AoV EOA, Contin i: 0.57 cm²/m²    Mitral Valve Measurements:     MV Vmax:     0.95 m/s  MV Mean Grad: 1.00 mmHg  MV Peak Grad: 3.6 mmHg  MV E Vmax:    0.8 m/s  MV A Vmax:    0.8 m/s  MV E/A:       1.0       Tricuspid Valve Measurements:     TR Vmax:          2.3 m/s  TR Peak Gradient: 21.0 mmHg        TTE Limited W or WO Ultrasound Enhancing Agent (23 @ 13:39)      CONCLUSIONS:      1. Left ventricular systolic function is normal with an ejection fraction visually estimated at 60 to 65 %.   2. Left ventricle was not well visualized.   3. Probably normal systolic function.   4. There is no echocardiographic evidence of tamponade on this study.  5. Trace pericardial effusion.    ________________________________________________________________________________________  FINDINGS:     Left Ventricle:  The left ventricle was not well visualized. Left ventricular systolic function is normal with an ejection fraction visually estimated at 60 to 65%.     Right Ventricle:  The right ventricular cavity is normal in size and probably normal systolic function.     Pericardium:  There is a trace pericardial effusion.     Pleura:  Moderate left pleural effusion noted.  ____________________________________________________________________  Quantitative Data:  Left Ventricle Measurements: (Indexed to BSA)     Visualized LV EF%: 60 to 65%       LVOT / RVOT/ Qp/Qs Data: (Indexed to BSA)

## 2023-09-28 NOTE — PROGRESS NOTE ADULT - PROBLEM SELECTOR PLAN 3
--CT C/A/P w/ B/L GGO   --Started empirically on IV zosyn (9/24- likely 2/2 c/f possible PNA  --Procal elevated  --Will treat for 5-7 days  --Trend leukocytosis- suspect some component likely reactive

## 2023-09-29 DIAGNOSIS — R19.7 DIARRHEA, UNSPECIFIED: ICD-10-CM

## 2023-09-29 LAB
ALBUMIN SERPL ELPH-MCNC: 3.1 G/DL — LOW (ref 3.3–5)
ALP SERPL-CCNC: 820 U/L — HIGH (ref 40–120)
ALT FLD-CCNC: 266 U/L — HIGH (ref 4–33)
ANION GAP SERPL CALC-SCNC: 14 MMOL/L — SIGNIFICANT CHANGE UP (ref 7–14)
AST SERPL-CCNC: 183 U/L — HIGH (ref 4–32)
BASOPHILS # BLD AUTO: 0 K/UL — SIGNIFICANT CHANGE UP (ref 0–0.2)
BASOPHILS NFR BLD AUTO: 0 % — SIGNIFICANT CHANGE UP (ref 0–2)
BILIRUB SERPL-MCNC: 0.9 MG/DL — SIGNIFICANT CHANGE UP (ref 0.2–1.2)
BUN SERPL-MCNC: 20 MG/DL — SIGNIFICANT CHANGE UP (ref 7–23)
BURR CELLS BLD QL SMEAR: PRESENT — SIGNIFICANT CHANGE UP
CALCIUM SERPL-MCNC: 8.4 MG/DL — SIGNIFICANT CHANGE UP (ref 8.4–10.5)
CHLORIDE SERPL-SCNC: 93 MMOL/L — LOW (ref 98–107)
CO2 SERPL-SCNC: 28 MMOL/L — SIGNIFICANT CHANGE UP (ref 22–31)
CREAT SERPL-MCNC: 2.23 MG/DL — HIGH (ref 0.5–1.3)
CULTURE RESULTS: SIGNIFICANT CHANGE UP
CULTURE RESULTS: SIGNIFICANT CHANGE UP
EGFR: 25 ML/MIN/1.73M2 — LOW
EOSINOPHIL # BLD AUTO: 0.35 K/UL — SIGNIFICANT CHANGE UP (ref 0–0.5)
EOSINOPHIL NFR BLD AUTO: 3.5 % — SIGNIFICANT CHANGE UP (ref 0–6)
GIANT PLATELETS BLD QL SMEAR: PRESENT — SIGNIFICANT CHANGE UP
GLUCOSE BLDC GLUCOMTR-MCNC: 187 MG/DL — HIGH (ref 70–99)
GLUCOSE BLDC GLUCOMTR-MCNC: 187 MG/DL — HIGH (ref 70–99)
GLUCOSE BLDC GLUCOMTR-MCNC: 209 MG/DL — HIGH (ref 70–99)
GLUCOSE BLDC GLUCOMTR-MCNC: 215 MG/DL — HIGH (ref 70–99)
GLUCOSE BLDC GLUCOMTR-MCNC: 225 MG/DL — HIGH (ref 70–99)
GLUCOSE SERPL-MCNC: 199 MG/DL — HIGH (ref 70–99)
HCT VFR BLD CALC: 26.2 % — LOW (ref 34.5–45)
HGB BLD-MCNC: 8.1 G/DL — LOW (ref 11.5–15.5)
IANC: 8.11 K/UL — HIGH (ref 1.8–7.4)
LYMPHOCYTES # BLD AUTO: 0.62 K/UL — LOW (ref 1–3.3)
LYMPHOCYTES # BLD AUTO: 6.2 % — LOW (ref 13–44)
MAGNESIUM SERPL-MCNC: 1.9 MG/DL — SIGNIFICANT CHANGE UP (ref 1.6–2.6)
MANUAL SMEAR VERIFICATION: SIGNIFICANT CHANGE UP
MCHC RBC-ENTMCNC: 25.7 PG — LOW (ref 27–34)
MCHC RBC-ENTMCNC: 30.9 GM/DL — LOW (ref 32–36)
MCV RBC AUTO: 83.2 FL — SIGNIFICANT CHANGE UP (ref 80–100)
MONOCYTES # BLD AUTO: 0.35 K/UL — SIGNIFICANT CHANGE UP (ref 0–0.9)
MONOCYTES NFR BLD AUTO: 3.5 % — SIGNIFICANT CHANGE UP (ref 2–14)
MYELOCYTES NFR BLD: 0.9 % — HIGH (ref 0–0)
NEUTROPHILS # BLD AUTO: 8.63 K/UL — HIGH (ref 1.8–7.4)
NEUTROPHILS NFR BLD AUTO: 84.1 % — HIGH (ref 43–77)
NEUTS BAND # BLD: 1.8 % — SIGNIFICANT CHANGE UP (ref 0–6)
NRBC # BLD: 1 /100 — HIGH (ref 0–0)
PHOSPHATE SERPL-MCNC: 2.2 MG/DL — LOW (ref 2.5–4.5)
PLAT MORPH BLD: NORMAL — SIGNIFICANT CHANGE UP
PLATELET # BLD AUTO: 71 K/UL — LOW (ref 150–400)
PLATELET COUNT - ESTIMATE: ABNORMAL
POIKILOCYTOSIS BLD QL AUTO: SLIGHT — SIGNIFICANT CHANGE UP
POLYCHROMASIA BLD QL SMEAR: SLIGHT — SIGNIFICANT CHANGE UP
POTASSIUM SERPL-MCNC: 3.6 MMOL/L — SIGNIFICANT CHANGE UP (ref 3.5–5.3)
POTASSIUM SERPL-SCNC: 3.6 MMOL/L — SIGNIFICANT CHANGE UP (ref 3.5–5.3)
PROT SERPL-MCNC: 5.8 G/DL — LOW (ref 6–8.3)
RBC # BLD: 3.15 M/UL — LOW (ref 3.8–5.2)
RBC # FLD: 15.9 % — HIGH (ref 10.3–14.5)
RBC BLD AUTO: ABNORMAL
SCHISTOCYTES BLD QL AUTO: SLIGHT — SIGNIFICANT CHANGE UP
SODIUM SERPL-SCNC: 135 MMOL/L — SIGNIFICANT CHANGE UP (ref 135–145)
SPECIMEN SOURCE: SIGNIFICANT CHANGE UP
SPECIMEN SOURCE: SIGNIFICANT CHANGE UP
WBC # BLD: 10.05 K/UL — SIGNIFICANT CHANGE UP (ref 3.8–10.5)
WBC # FLD AUTO: 10.05 K/UL — SIGNIFICANT CHANGE UP (ref 3.8–10.5)

## 2023-09-29 PROCEDURE — 93321 DOPPLER ECHO F-UP/LMTD STD: CPT | Mod: 26

## 2023-09-29 PROCEDURE — 99233 SBSQ HOSP IP/OBS HIGH 50: CPT

## 2023-09-29 PROCEDURE — 93308 TTE F-UP OR LMTD: CPT | Mod: 26,GC

## 2023-09-29 PROCEDURE — 71046 X-RAY EXAM CHEST 2 VIEWS: CPT | Mod: 26

## 2023-09-29 RX ORDER — CHLORHEXIDINE GLUCONATE 213 G/1000ML
1 SOLUTION TOPICAL
Refills: 0 | Status: DISCONTINUED | OUTPATIENT
Start: 2023-09-29 | End: 2023-10-06

## 2023-09-29 RX ADMIN — PIPERACILLIN AND TAZOBACTAM 25 GRAM(S): 4; .5 INJECTION, POWDER, LYOPHILIZED, FOR SOLUTION INTRAVENOUS at 16:59

## 2023-09-29 RX ADMIN — PANTOPRAZOLE SODIUM 40 MILLIGRAM(S): 20 TABLET, DELAYED RELEASE ORAL at 06:38

## 2023-09-29 RX ADMIN — Medication 2: at 08:56

## 2023-09-29 RX ADMIN — Medication 125 MICROGRAM(S): at 04:25

## 2023-09-29 RX ADMIN — CHLORHEXIDINE GLUCONATE 1 APPLICATION(S): 213 SOLUTION TOPICAL at 06:38

## 2023-09-29 RX ADMIN — CHLORHEXIDINE GLUCONATE 1 APPLICATION(S): 213 SOLUTION TOPICAL at 16:59

## 2023-09-29 RX ADMIN — PIPERACILLIN AND TAZOBACTAM 25 GRAM(S): 4; .5 INJECTION, POWDER, LYOPHILIZED, FOR SOLUTION INTRAVENOUS at 06:38

## 2023-09-29 RX ADMIN — Medication 4: at 18:18

## 2023-09-29 RX ADMIN — Medication 30 MILLIGRAM(S): at 06:37

## 2023-09-29 NOTE — PROGRESS NOTE ADULT - SUBJECTIVE AND OBJECTIVE BOX
Interval History:  Patient resting comfortably in bed   Denies CP/SOB/palpitations/dizziness  No acute events overnight      Medications:  chlorhexidine 2% Cloths 1 Application(s) Topical <User Schedule>  dextrose 5%. 1000 milliLiter(s) IV Continuous <Continuous>  dextrose 5%. 1000 milliLiter(s) IV Continuous <Continuous>  dextrose 50% Injectable 25 Gram(s) IV Push once  dextrose 50% Injectable 12.5 Gram(s) IV Push once  dextrose 50% Injectable 25 Gram(s) IV Push once  dextrose Oral Gel 15 Gram(s) Oral once PRN  glucagon  Injectable 1 milliGRAM(s) IntraMuscular once  insulin lispro (ADMELOG) corrective regimen sliding scale   SubCutaneous at bedtime  insulin lispro (ADMELOG) corrective regimen sliding scale   SubCutaneous three times a day before meals  levothyroxine 125 MICROGram(s) Oral daily  melatonin 3 milliGRAM(s) Oral at bedtime PRN  NIFEdipine XL 30 milliGRAM(s) Oral daily  pantoprazole    Tablet 40 milliGRAM(s) Oral before breakfast  piperacillin/tazobactam IVPB.. 3.375 Gram(s) IV Intermittent every 12 hours  polyethylene glycol 3350 17 Gram(s) Oral daily  senna 2 Tablet(s) Oral at bedtime      Vitals:  T(C): 36.4 (09-29-23 @ 07:49), Max: 36.4 (09-28-23 @ 12:59)  HR: 77 (09-29-23 @ 07:49) (74 - 83)  BP: 146/68 (09-29-23 @ 07:49) (134/63 - 176/84)  BP(mean): --  RR: 18 (09-29-23 @ 07:49) (18 - 18)  SpO2: 95% (09-29-23 @ 07:49) (95% - 100%)    Daily     Daily         I&O's Summary    28 Sep 2023 07:01  -  29 Sep 2023 07:00  --------------------------------------------------------  IN: 1200 mL / OUT: 2600 mL / NET: -1400 mL        Physical Exam:  Appearance: No Acute Distress  Neck: JVP  Cardiovascular: Normal S1 S2  Respiratory: Clear to auscultation bilaterally  Gastrointestinal: Soft, Non-tender	  Skin: No cyanosis	  Neurologic: Non-focal  Extremities: No LE edema  Psychiatry: A & O x 3, Mood & affect appropriate    Labs:                        8.1    10.05 )-----------( 71       ( 29 Sep 2023 05:39 )             26.2     09-29    135  |  93<L>  |  20  ----------------------------<  199<H>  3.6   |  28  |  2.23<H>    Ca    8.4      29 Sep 2023 05:39  Phos  2.2     09-29  Mg     1.90     09-29    TPro  5.8<L>  /  Alb  3.1<L>  /  TBili  0.9  /  DBili  x   /  AST  183<H>  /  ALT  266<H>  /  AlkPhos  820<H>  09-29      TELEMETRY:         Echocardiogram:    TTE Limited W or WO Ultrasound Enhancing Agent (09.25.23 @ 13:39)     FINDINGS:     Left Ventricle:  The left ventricle was not well visualized. Left ventricular systolic function is normal with an ejection fraction visually estimated at 60 to 65%.     Right Ventricle:  The right ventricular cavity is normal in size and probably normal systolic function.     Pericardium:  There is a trace pericardial effusion.     Pleura:  Moderate left pleural effusion noted.  ____________________________________________________________________  Quantitative Data:  Left Ventricle Measurements: (Indexed to BSA)     Visualized LV EF%: 60 to 65%

## 2023-09-29 NOTE — CHART NOTE - NSCHARTNOTEFT_GEN_A_CORE
NIGHT HOSPITAL MEDICINE COVERAGE    RN notified that pt's dressing at the site of prior pericardial drain is saturated w/ blood. Upon removal of dressing, RN noted active oozing and despite application of pressure for 10 minutes, bleeding continued.    Writer reviewed chart and assessed pt at bedside. Pt denied any complaints of pain, dizziness, weakness, SOB/dyspnea, or chest pain. VSS.  Redressed site w/ Surgicel hemostat, gauze, and tegaderm. A 1L IV bag was also applied to chest wall.   Informed day ACP to monitor dressing for recurrent bleeding.      Zakia Cannon PA-C  Department of Medicine - Night Coverage  [facility]  In House Pager [phone]

## 2023-09-29 NOTE — PROGRESS NOTE ADULT - PROBLEM SELECTOR PLAN 2
--Pt previously w/ obstructive shock i/s/o pericardial effusion w/ tamponade.  --s/p pericardial drain placement and removal  --Now resolved  --Continue tele monitoring  --F/u cards recs

## 2023-09-29 NOTE — PROGRESS NOTE ADULT - SUBJECTIVE AND OBJECTIVE BOX
Leah Cadena        Patient is a 58y old  Female who presents with a chief complaint of progressive dyspnea (29 Sep 2023 10:44)      SUBJECTIVE / OVERNIGHT EVENTS: Noted bleeding at pericardial drain site overnight, now resolved.  Also w/ diarrhea w/ pt states started two nights ago , 2 episodes 2 nights ago and 2 episodes last night. Otherwise w/o complaints. Denies fevers, chills, nausea, vomiting, chest pain, SOB, abdominal pain.    MEDICATIONS  (STANDING):  chlorhexidine 2% Cloths 1 Application(s) Topical <User Schedule>  dextrose 5%. 1000 milliLiter(s) (50 mL/Hr) IV Continuous <Continuous>  dextrose 5%. 1000 milliLiter(s) (100 mL/Hr) IV Continuous <Continuous>  dextrose 50% Injectable 25 Gram(s) IV Push once  dextrose 50% Injectable 12.5 Gram(s) IV Push once  dextrose 50% Injectable 25 Gram(s) IV Push once  glucagon  Injectable 1 milliGRAM(s) IntraMuscular once  insulin lispro (ADMELOG) corrective regimen sliding scale   SubCutaneous at bedtime  insulin lispro (ADMELOG) corrective regimen sliding scale   SubCutaneous three times a day before meals  levothyroxine 125 MICROGram(s) Oral daily  NIFEdipine XL 30 milliGRAM(s) Oral daily  pantoprazole    Tablet 40 milliGRAM(s) Oral before breakfast  piperacillin/tazobactam IVPB.. 3.375 Gram(s) IV Intermittent every 12 hours  polyethylene glycol 3350 17 Gram(s) Oral daily  senna 2 Tablet(s) Oral at bedtime    MEDICATIONS  (PRN):  dextrose Oral Gel 15 Gram(s) Oral once PRN Blood Glucose LESS THAN 70 milliGRAM(s)/deciliter  melatonin 3 milliGRAM(s) Oral at bedtime PRN Insomnia    Allergies    No Known Allergies    Intolerances        Vital Signs Last 24 Hrs  T(C): 36.3 (29 Sep 2023 12:00), Max: 36.4 (28 Sep 2023 16:20)  T(F): 97.4 (29 Sep 2023 12:00), Max: 97.6 (28 Sep 2023 16:20)  HR: 69 (29 Sep 2023 12:00) (69 - 80)  BP: 120/53 (29 Sep 2023 12:00) (120/53 - 176/84)  BP(mean): --  RR: 18 (29 Sep 2023 12:00) (18 - 18)  SpO2: 100% (29 Sep 2023 12:00) (95% - 100%)    Parameters below as of 29 Sep 2023 12:00  Patient On (Oxygen Delivery Method): room air      Daily     Daily   CAPILLARY BLOOD GLUCOSE      POCT Blood Glucose.: 187 mg/dL (29 Sep 2023 11:58)  POCT Blood Glucose.: 187 mg/dL (29 Sep 2023 08:50)  POCT Blood Glucose.: 238 mg/dL (28 Sep 2023 22:07)  POCT Blood Glucose.: 144 mg/dL (28 Sep 2023 17:05)    I&O's Summary    28 Sep 2023 07:01  -  29 Sep 2023 07:00  --------------------------------------------------------  IN: 1200 mL / OUT: 2600 mL / NET: -1400 mL    29 Sep 2023 07:01  -  29 Sep 2023 15:32  --------------------------------------------------------  IN: 0 mL / OUT: 100 mL / NET: -100 mL        PHYSICAL EXAM:    GENERAL: NAD  HEENT:  Atraumatic, Normocephalic, EOMI, conjunctiva and sclera clear  NECK: Supple, R IJ Non tunneled cath (dressing c/d/i)  CHEST/LUNG: Clear to auscultation bilaterally; No wheeze, crackles or rhonchi   HEART: Regular rate and rhythm; Normal S1 S2, No murmurs, rubs, or gallops. s/p pericardial drain removal , site c/d/i w/o bleed  ABDOMEN: Soft, Nontender, Nondistended; Bowel sounds present  EXTREMITIES:  2+ Peripheral Pulses, No edema  PSYCH: AAOx3  NEUROLOGY: non-focal  SKIN: Warm and dry    LABS:                        8.1    10.05 )-----------( 71       ( 29 Sep 2023 05:39 )             26.2     Hgb Trend: 8.1<--, 8.7<--, 7.7<--, 7.5<--, 7.7<--  09-29    135  |  93<L>  |  20  ----------------------------<  199<H>  3.6   |  28  |  2.23<H>    Ca    8.4      29 Sep 2023 05:39  Phos  2.2     09-29  Mg     1.90     09-29    TPro  5.8<L>  /  Alb  3.1<L>  /  TBili  0.9  /  DBili  x   /  AST  183<H>  /  ALT  266<H>  /  AlkPhos  820<H>  09-29    Creatinine Trend: 2.23<--, 2.80<--, 2.01<--, 2.10<--, 1.57<--, 2.91<--  LIVER FUNCTIONS - ( 29 Sep 2023 05:39 )  Alb: 3.1 g/dL / Pro: 5.8 g/dL / ALK PHOS: 820 U/L / ALT: 266 U/L / AST: 183 U/L / GGT: x                 Urinalysis Basic - ( 29 Sep 2023 05:39 )    Color: x / Appearance: x / SG: x / pH: x  Gluc: 199 mg/dL / Ketone: x  / Bili: x / Urobili: x   Blood: x / Protein: x / Nitrite: x   Leuk Esterase: x / RBC: x / WBC x   Sq Epi: x / Non Sq Epi: x / Bacteria: x        RADIOLOGY & ADDITIONAL TESTS:    Imaging Personally Reviewed.    Consultant(s) Notes Reviewed.    Care Discussed with Consultants/Other Providers.

## 2023-09-29 NOTE — PROGRESS NOTE ADULT - PROBLEM SELECTOR PLAN 1
--Likely multifactorial i/s/o acute on chronic HF +/- progressive renal failure +/- possible PNA. Lower suspicion for PE at this time  --TTE 08/23 w/ EF 55%, Mildly enlarged RV and Large pericardial effusion  --CT C/A/P w/ B/L GGO and mod B/L pleural effusions  --Pt oliguric. W/o sig improvement w/ Bumex gtt  --C/w HD for volume removal  --Dialysis dependent- will plan for tunneled cath placement per renal recs. IR Consulted, recs reconsult next week once blood cx negative. Blood cx negative 09/24 (1 bottle), will repeat  --Appreciate HF/Renal input  --Rest of mgt as below

## 2023-09-29 NOTE — PROVIDER CONTACT NOTE (OTHER) - ACTION/TREATMENT ORDERED:
Will assess at bedside and re-dress drain site. Will assess at bedside and re-dress drain site. Hold 6am dose heparin.

## 2023-09-29 NOTE — PROGRESS NOTE ADULT - CRITICAL CARE ATTENDING COMMENT
Bryan Chaudhari MD  Interventional Pulmonology & Critical Care Medicine
Since pericardiocentesis has felt well. Drain removed today. Reports some SOB. On exam, JVP approx 8-10, RRR, no m/r/g, dec BS b/l, nontender abdomen, no pedal edema. Labs reviewed. TTE reviewed.   - agree with fluid removal   - if BP>130, can start procardia 30 mg daily   - repeat CXR; may require thoracentesis   - repeat TTE tomorrow evaluate for reaccumulation
Weekend events noted. Became hypotensive with dialysis with pressor requirement. TTE noted large pericardial effusion with labs concerning for obstructive shock. Underwent pericardiocentesis with sanguinous output; drain still in place. Sicne pressor requirement improved. On exam, NAD, mild tachypnea, JVP elevated, RRR, no m/r/g, CTAB, sacral edema. Labs reviewed. TTE reviewed.   - continue weaning pressors as tolerated  - agree with fluid removal   - repeat TTE to evaluate for residual pericardial effusion  - likely can remove drain if no effusion  - prognosis guarded
Since pericardiocentesis has felt well. Drain removed yesterday; awaiting TTE. Sitting up in chair. On exam, JVP approx 10-12, RRR, no m/r/g, dec BS b/l, nontender abdomen, no pedal edema. Labs reviewed. TTE reviewed.   - recommend continued fluid removal; daily HD/UF  - c/w procardia 30 mg daily   - repeat CXR with small pleural effusion  - repeat TTE evaluate for reaccumulation

## 2023-09-29 NOTE — PROGRESS NOTE ADULT - ASSESSMENT
58 year old Female with PMH of  HTN, HLD, DM2, asthma, CKD (baseline SCr ~2.0), hypothyroidism c/b myxedema coma in past), anemia requiring recent transfusions (followed by GI with plan for scope), and HFpEF with severe pulmonary hypertension (EF 64%;LVIDd 4.6 RVE with normal RV function, mod-severe TR and severe PH RVSP~65). Patient presented with c/o progressive worsening SOB/MARKS/PND and 3 pillow orthopnea X 5days; treated in the ED with IV Lasix 40mg followed by IV Bumex 2mg BID.      9/22- Patient with worsening SCr and signs of uremia requiring emergency HD and transfer to MICU. Subsequently, she became hypotensive requiring pressor support.     Pertinent Labs: BNP  1819     Lactate 1.0    Troponin levels    64/65    BUN/Cr  110/3.92   Na 122    TSH: T3/T4  Pending             K/L  (1/2023)  8.9/3.75 ratio 2.35  CXR: Pulmonary edema with small bilateral pleural effusions  EKG:  Normal sinus rhythm, possible Left atrial enlargement  TTE: Normal LV systolic function, Right ventricular enlargement with normal RV systolic function, Moderate-severe TR and PASP equals 65 consistent with PH.  RHC (8/23):  RA 17 PA 51/22/36  PCWP  25  CO/CI 6.7/4.2     and  PVR 2.6 POWERS       9/24 TTE revealed large pericardial effusion, underwent pericardiocentesis with sanguinous output; drain removed on 9/28 9/25 TTE revealed trace pericardial effusion

## 2023-09-29 NOTE — PROVIDER CONTACT NOTE (OTHER) - ACTION/TREATMENT ORDERED:
Reassess BP in 10-15 minutes, notify if patient becomes symptomatic Reassess BP in 10-15 minutes, notify if patient becomes symptomatic. Notify provider if patient experiences further loose BMs.

## 2023-09-29 NOTE — PROGRESS NOTE ADULT - PROBLEM SELECTOR PLAN 1
HD M-W-F  Procardia XL 30mg daily (hold SBP<90)   Strict I/O  Daily standing weights  Monitor lytes replete K>4.0 and Mg>2.0   Trend SCr  Please order TTE to evaluate pericardial effusion s/p drain removal  Please order CXR follow up on worsening pleural effusion      Management per primary team  Appreciate Nephrology recommendations   Pending final recommendations from HF attending HD M-W-F  Procardia XL 30mg daily (hold SBP<90)   Strict I/O  Daily standing weights  Monitor lytes replete K>4.0 and Mg>2.0   Trend SCr  TTE evaluate pericardial effusion s/p drain removal; ordered/pending    CXR follow up on worsening pleural effusion; ordered/pending       Management per primary team  Appreciate Nephrology recommendations   Pending final recommendations from HF attending

## 2023-09-29 NOTE — PROVIDER CONTACT NOTE (OTHER) - ASSESSMENT
Patient AOx4. Hypertensive, 175/87. Asymptomatic, denies headache, vision changes, shortness of breath, and chest pain. HR 80. Patient experiencing diarrhea. Patient has had 2 loose bowel movements within two hours. BP assessed following BM. Patient resting in chair currently.
Patient AOx4. VSS. Denies chest pain, shortness of breath, and dizziness. Drain dressing completely saturated with blood, continues to bleed despite pressure. Blood clots present on soiled gauze dressing.
Patient A&Ox4, VS as documented, NSR on tele. Patient denies chest pain. Patient just found out she is going to start HD. Patient on 3L NC. Bumex running at 10 mL/hr.

## 2023-09-29 NOTE — PROVIDER CONTACT NOTE (OTHER) - REASON
/105
Patient bleeding from pericardial drain site
Patient hypertensive and experiencing loose bowel movements

## 2023-09-29 NOTE — PROVIDER CONTACT NOTE (OTHER) - BACKGROUND
Patient admitted for heart failure. PMH of HTN, HLD, DM2, CKD.
Patient admitted for shortness of breath and chest pain. Found to have pericardial effusion, pericardial drain placed. Drain removed 9/28.
Patient admitted for shortness of breath and left sided chest tightness. Pericardial drain placed for pericardial effusion, removed 9/28. Patient presents with CKD, last dialysis 9/28.

## 2023-09-29 NOTE — PROVIDER CONTACT NOTE (OTHER) - SITUATION
Patient bleeding from pericardial drain site, dressing fully saturated with blood. Site continues to bleed despite holding pressure with gauze pads.
Patient hypertensive 175/87. Patient also experiencing loose bowel movements. BP assessed after episode of diarrhea.
/105

## 2023-09-29 NOTE — PROGRESS NOTE ADULT - PROBLEM SELECTOR PLAN 3
--CT C/A/P w/ B/L GGO . Procal elevated  --c/w IV zosyn (9/24- likely 2/2 c/f possible PNA  --Anticipate 7 day course of antibiotics  --Blood Cx 9/24 w/ GPR (1 Bottle), 2nd bottle negative  --Repeat blood cx x 2 in the AM   --Trend leukocytosis- Now resolved

## 2023-09-30 LAB
ALBUMIN SERPL ELPH-MCNC: 3.2 G/DL — LOW (ref 3.3–5)
ALP SERPL-CCNC: 641 U/L — HIGH (ref 40–120)
ALT FLD-CCNC: 195 U/L — HIGH (ref 4–33)
ANION GAP SERPL CALC-SCNC: 10 MMOL/L — SIGNIFICANT CHANGE UP (ref 7–14)
AST SERPL-CCNC: 93 U/L — HIGH (ref 4–32)
BASOPHILS # BLD AUTO: 0.02 K/UL — SIGNIFICANT CHANGE UP (ref 0–0.2)
BASOPHILS NFR BLD AUTO: 0.2 % — SIGNIFICANT CHANGE UP (ref 0–2)
BILIRUB SERPL-MCNC: 0.7 MG/DL — SIGNIFICANT CHANGE UP (ref 0.2–1.2)
BUN SERPL-MCNC: 30 MG/DL — HIGH (ref 7–23)
CALCIUM SERPL-MCNC: 7.9 MG/DL — LOW (ref 8.4–10.5)
CHLORIDE SERPL-SCNC: 97 MMOL/L — LOW (ref 98–107)
CO2 SERPL-SCNC: 29 MMOL/L — SIGNIFICANT CHANGE UP (ref 22–31)
CREAT SERPL-MCNC: 3.05 MG/DL — HIGH (ref 0.5–1.3)
EGFR: 17 ML/MIN/1.73M2 — LOW
EOSINOPHIL # BLD AUTO: 0.34 K/UL — SIGNIFICANT CHANGE UP (ref 0–0.5)
EOSINOPHIL NFR BLD AUTO: 3.5 % — SIGNIFICANT CHANGE UP (ref 0–6)
GI PCR PANEL: SIGNIFICANT CHANGE UP
GLUCOSE BLDC GLUCOMTR-MCNC: 131 MG/DL — HIGH (ref 70–99)
GLUCOSE BLDC GLUCOMTR-MCNC: 138 MG/DL — HIGH (ref 70–99)
GLUCOSE BLDC GLUCOMTR-MCNC: 141 MG/DL — HIGH (ref 70–99)
GLUCOSE BLDC GLUCOMTR-MCNC: 176 MG/DL — HIGH (ref 70–99)
GLUCOSE BLDC GLUCOMTR-MCNC: 245 MG/DL — HIGH (ref 70–99)
GLUCOSE BLDC GLUCOMTR-MCNC: 280 MG/DL — HIGH (ref 70–99)
GLUCOSE SERPL-MCNC: 209 MG/DL — HIGH (ref 70–99)
HCT VFR BLD CALC: 23.4 % — LOW (ref 34.5–45)
HGB BLD-MCNC: 7.5 G/DL — LOW (ref 11.5–15.5)
IANC: 7.49 K/UL — HIGH (ref 1.8–7.4)
IMM GRANULOCYTES NFR BLD AUTO: 2.3 % — HIGH (ref 0–0.9)
LYMPHOCYTES # BLD AUTO: 0.81 K/UL — LOW (ref 1–3.3)
LYMPHOCYTES # BLD AUTO: 8.4 % — LOW (ref 13–44)
MAGNESIUM SERPL-MCNC: 1.8 MG/DL — SIGNIFICANT CHANGE UP (ref 1.6–2.6)
MCHC RBC-ENTMCNC: 26.6 PG — LOW (ref 27–34)
MCHC RBC-ENTMCNC: 32.1 GM/DL — SIGNIFICANT CHANGE UP (ref 32–36)
MCV RBC AUTO: 83 FL — SIGNIFICANT CHANGE UP (ref 80–100)
MONOCYTES # BLD AUTO: 0.72 K/UL — SIGNIFICANT CHANGE UP (ref 0–0.9)
MONOCYTES NFR BLD AUTO: 7.5 % — SIGNIFICANT CHANGE UP (ref 2–14)
NEUTROPHILS # BLD AUTO: 7.49 K/UL — HIGH (ref 1.8–7.4)
NEUTROPHILS NFR BLD AUTO: 78.1 % — HIGH (ref 43–77)
NRBC # BLD: 0 /100 WBCS — SIGNIFICANT CHANGE UP (ref 0–0)
NRBC # FLD: 0.06 K/UL — HIGH (ref 0–0)
PHOSPHATE SERPL-MCNC: 2.5 MG/DL — SIGNIFICANT CHANGE UP (ref 2.5–4.5)
PLATELET # BLD AUTO: 49 K/UL — LOW (ref 150–400)
POTASSIUM SERPL-MCNC: 3.7 MMOL/L — SIGNIFICANT CHANGE UP (ref 3.5–5.3)
POTASSIUM SERPL-SCNC: 3.7 MMOL/L — SIGNIFICANT CHANGE UP (ref 3.5–5.3)
PROT SERPL-MCNC: 6 G/DL — SIGNIFICANT CHANGE UP (ref 6–8.3)
RBC # BLD: 2.82 M/UL — LOW (ref 3.8–5.2)
RBC # FLD: 16.3 % — HIGH (ref 10.3–14.5)
SODIUM SERPL-SCNC: 136 MMOL/L — SIGNIFICANT CHANGE UP (ref 135–145)
WBC # BLD: 9.6 K/UL — SIGNIFICANT CHANGE UP (ref 3.8–10.5)
WBC # FLD AUTO: 9.6 K/UL — SIGNIFICANT CHANGE UP (ref 3.8–10.5)

## 2023-09-30 PROCEDURE — 99232 SBSQ HOSP IP/OBS MODERATE 35: CPT

## 2023-09-30 PROCEDURE — 90935 HEMODIALYSIS ONE EVALUATION: CPT | Mod: GC

## 2023-09-30 RX ADMIN — Medication 30 MILLIGRAM(S): at 05:20

## 2023-09-30 RX ADMIN — PIPERACILLIN AND TAZOBACTAM 25 GRAM(S): 4; .5 INJECTION, POWDER, LYOPHILIZED, FOR SOLUTION INTRAVENOUS at 05:19

## 2023-09-30 RX ADMIN — PIPERACILLIN AND TAZOBACTAM 25 GRAM(S): 4; .5 INJECTION, POWDER, LYOPHILIZED, FOR SOLUTION INTRAVENOUS at 17:18

## 2023-09-30 RX ADMIN — PANTOPRAZOLE SODIUM 40 MILLIGRAM(S): 20 TABLET, DELAYED RELEASE ORAL at 05:20

## 2023-09-30 RX ADMIN — CHLORHEXIDINE GLUCONATE 1 APPLICATION(S): 213 SOLUTION TOPICAL at 05:18

## 2023-09-30 RX ADMIN — Medication 6: at 17:18

## 2023-09-30 RX ADMIN — Medication 125 MICROGRAM(S): at 05:19

## 2023-09-30 NOTE — PROGRESS NOTE ADULT - PROBLEM SELECTOR PLAN 5
--Likely 2/2 anemia of chronic disease + Acute blood loss anemia i/s/o recent bleed from pericardial drain site  --Hgb downtrending  --Anemia w/o c/w ACD  --Monitor Hgb  --Transfuse Hgb < 7

## 2023-09-30 NOTE — PROGRESS NOTE ADULT - SUBJECTIVE AND OBJECTIVE BOX
[facility] Division of Kidney Diseases & Hypertension  FOLLOW UP NOTE  [phone]--------------------------------------------------------------------------------  Chief Complaint: ISAMAR on CKD/Ongoing hemodialysis requirement    24 hour events/subjective: Pt. with oliguric ISAMAR on CKD, initiated on HD on 9/22/23. Pt. seen and examined during HD today. Reports doing well and no acute complaints. No fever, CP, SOB, HA or dizziness during HD rounds.      PAST HISTORY  --------------------------------------------------------------------------------  No significant changes to PMH, PSH, FHx, SHx, unless otherwise noted    ALLERGIES & MEDICATIONS  --------------------------------------------------------------------------------  Allergies  No Known Allergies  Intolerances    Standing Inpatient Medications  chlorhexidine 2% Cloths 1 Application(s) Topical <User Schedule>  dextrose 5%. 1000 milliLiter(s) IV Continuous <Continuous>  dextrose 5%. 1000 milliLiter(s) IV Continuous <Continuous>  dextrose 50% Injectable 25 Gram(s) IV Push once  dextrose 50% Injectable 12.5 Gram(s) IV Push once  dextrose 50% Injectable 25 Gram(s) IV Push once  glucagon  Injectable 1 milliGRAM(s) IntraMuscular once  insulin lispro (ADMELOG) corrective regimen sliding scale   SubCutaneous three times a day before meals  insulin lispro (ADMELOG) corrective regimen sliding scale   SubCutaneous at bedtime  levothyroxine 125 MICROGram(s) Oral daily  NIFEdipine XL 30 milliGRAM(s) Oral daily  pantoprazole    Tablet 40 milliGRAM(s) Oral before breakfast  piperacillin/tazobactam IVPB.. 3.375 Gram(s) IV Intermittent every 12 hours  polyethylene glycol 3350 17 Gram(s) Oral daily  senna 2 Tablet(s) Oral at bedtime    PRN Inpatient Medications  dextrose Oral Gel 15 Gram(s) Oral once PRN  melatonin 3 milliGRAM(s) Oral at bedtime PRN      REVIEW OF SYSTEMS  --------------------------------------------------------------------------------  as above    VITALS/PHYSICAL EXAM  --------------------------------------------------------------------------------  T(C): 36.8 (09-30-23 @ 10:38), Max: 36.8 (09-30-23 @ 10:38)  HR: 72 (09-30-23 @ 10:38) (66 - 74)  BP: 130/65 (09-30-23 @ 10:38) (111/52 - 130/65)  RR: 18 (09-30-23 @ 10:38) (16 - 18)  SpO2: 98% (09-30-23 @ 10:38) (92% - 100%)  Wt(kg): --    09-29-23 @ 07:01  -  09-30-23 @ 07:00  --------------------------------------------------------  IN: 0 mL / OUT: 180 mL / NET: -180 mL    09-30-23 @ 07:01  -  09-30-23 @ 12:10  --------------------------------------------------------  IN: 400 mL / OUT: 2400 mL / NET: -2000 mL      Physical Exam:  Gen: resting, NAD   HEENT: Right IJ non-tunneled HD catheter+  Pulm: CTA B/L  CV: S1S2+  Abd: Soft, +BS   Ext: No LE edema   Neuro: Awake and alert  Skin: Warm and dry  Vascular access: Right IJ non-tunneled HD catheter being used for HD     LABS/STUDIES  --------------------------------------------------------------------------------              7.5    9.60  >-----------<  49       [09-30-23 @ 07:25]              23.4     136  |  97  |  30  ----------------------------<  209      [09-30-23 @ 07:25]  3.7   |  29  |  3.05        Ca     7.9     [09-30-23 @ 07:25]      Mg     1.80     [09-30-23 @ 07:25]      Phos  2.5     [09-30-23 @ 07:25]    TPro  6.0  /  Alb  3.2  /  TBili  0.7  /  DBili  x   /  AST  93  /  ALT  195  /  AlkPhos  641  [09-30-23 @ 07:25]    Creatinine Trend:  SCr 3.05 [09-30 @ 07:25]  SCr 2.23 [09-29 @ 05:39]  SCr 2.80 [09-28 @ 04:45]  SCr 2.01 [09-27 @ 06:05]  SCr 2.10 [09-26 @ 00:05]

## 2023-09-30 NOTE — PROGRESS NOTE ADULT - ATTENDING COMMENTS
1. ISAMAR on CKD - patient seen on dialysis, tolerating treatment at time of visit.  See above recommendations.   2. Anemia - repeat CBC 10/2 1. ISAMAR on CKD - patient seen on dialysis, tolerating treatment at time of visit.  See above recommendations.   2. Anemia - repeat CBC 10/1

## 2023-09-30 NOTE — PROGRESS NOTE ADULT - PROBLEM SELECTOR PLAN 4
--Pt w/ new diarrhea suspect 2/2 antibiotics  --F/u GI PCR/ O/P  --Obtain stool cx   --Monitor stool count

## 2023-09-30 NOTE — PROGRESS NOTE ADULT - PROBLEM SELECTOR PLAN 1
Pt. with oliguric ISAMAR on CKD in setting of HF and fluid overload. On review of labs on [place], Scr elevated at 3.31 on 8/30/23, increased to 3.92 on admission (9/20). Pt. initiated on IV Bumex infusion on 9/21. However, pt. remained anuric despite increase in IV Bumex infusion rate. Scr increased to 3.89 on 9/22. Pt. underwent right IJ non-tunneled HD catheter placement, initiated on HD on 9/22/23. Pt. tolerating HD today. HD catheter functioning well. Pt. remains dialysis dependent, recommend tunneled HD catheter placement by IR. Monitor labs, VS and urine output. Avoid any potential nephrotoxins. Dose medications for HD.     Final recommendations pending attending signature.    If you have any questions, please feel free to contact me  Chay Koehler  Nephrology Fellow  [employer]/Page [phone]  (After 5pm or on weekends please page the on-call fellow)

## 2023-09-30 NOTE — PROGRESS NOTE ADULT - ASSESSMENT
Pt. with ISAMAR on CKD, initiated on HD during current hospital stay. Pt. remains dialysis dependent.

## 2023-09-30 NOTE — PROGRESS NOTE ADULT - SUBJECTIVE AND OBJECTIVE BOX
Leah Cadena        Patient is a 58y old  Female who presents with a chief complaint of progressive dyspnea (30 Sep 2023 12:10)      SUBJECTIVE / OVERNIGHT EVENTS: No acute overnight events. This morning pt doing well. States diarrhea is improving but still present. Otherwise w/o complaints. No further bleeding from percardial drain site. No blood in stool or urine         MEDICATIONS  (STANDING):  chlorhexidine 2% Cloths 1 Application(s) Topical <User Schedule>  dextrose 5%. 1000 milliLiter(s) (50 mL/Hr) IV Continuous <Continuous>  dextrose 5%. 1000 milliLiter(s) (100 mL/Hr) IV Continuous <Continuous>  dextrose 50% Injectable 12.5 Gram(s) IV Push once  dextrose 50% Injectable 25 Gram(s) IV Push once  dextrose 50% Injectable 25 Gram(s) IV Push once  glucagon  Injectable 1 milliGRAM(s) IntraMuscular once  insulin lispro (ADMELOG) corrective regimen sliding scale   SubCutaneous three times a day before meals  insulin lispro (ADMELOG) corrective regimen sliding scale   SubCutaneous at bedtime  levothyroxine 125 MICROGram(s) Oral daily  NIFEdipine XL 30 milliGRAM(s) Oral daily  pantoprazole    Tablet 40 milliGRAM(s) Oral before breakfast  piperacillin/tazobactam IVPB.. 3.375 Gram(s) IV Intermittent every 12 hours  polyethylene glycol 3350 17 Gram(s) Oral daily  senna 2 Tablet(s) Oral at bedtime    MEDICATIONS  (PRN):  dextrose Oral Gel 15 Gram(s) Oral once PRN Blood Glucose LESS THAN 70 milliGRAM(s)/deciliter  melatonin 3 milliGRAM(s) Oral at bedtime PRN Insomnia    Allergies    No Known Allergies    Intolerances        Vital Signs Last 24 Hrs  T(C): 36.3 (30 Sep 2023 12:00), Max: 36.8 (30 Sep 2023 10:38)  T(F): 97.3 (30 Sep 2023 12:00), Max: 98.2 (30 Sep 2023 10:38)  HR: 71 (30 Sep 2023 12:00) (66 - 74)  BP: 116/53 (30 Sep 2023 12:00) (111/52 - 130/65)  BP(mean): --  RR: 17 (30 Sep 2023 12:00) (16 - 18)  SpO2: 100% (30 Sep 2023 12:00) (92% - 100%)    Parameters below as of 30 Sep 2023 12:00  Patient On (Oxygen Delivery Method): room air      Daily     Daily Weight in k.8 (30 Sep 2023 10:27)  CAPILLARY BLOOD GLUCOSE      POCT Blood Glucose.: 176 mg/dL (30 Sep 2023 12:11)  POCT Blood Glucose.: 138 mg/dL (30 Sep 2023 10:38)  POCT Blood Glucose.: 131 mg/dL (30 Sep 2023 09:22)  POCT Blood Glucose.: 245 mg/dL (30 Sep 2023 05:57)  POCT Blood Glucose.: 209 mg/dL (29 Sep 2023 21:35)  POCT Blood Glucose.: 225 mg/dL (29 Sep 2023 18:14)  POCT Blood Glucose.: 215 mg/dL (29 Sep 2023 16:52)    I&O's Summary    29 Sep 2023 07:01  -  30 Sep 2023 07:00  --------------------------------------------------------  IN: 0 mL / OUT: 180 mL / NET: -180 mL    30 Sep 2023 07:01  -  30 Sep 2023 15:20  --------------------------------------------------------  IN: 400 mL / OUT: 2400 mL / NET: -2000 mL        PHYSICAL EXAM:    GENERAL: NAD  HEENT:  Atraumatic, Normocephalic, EOMI, conjunctiva and sclera clear  NECK: Supple, R IJ Non tunneled cath (dressing c/d/i)  CHEST/LUNG: Clear to auscultation bilaterally; No wheeze, crackles or rhonchi   HEART: Regular rate and rhythm; Normal S1 S2, No murmurs, rubs, or gallops. s/p pericardial drain removal , site c/d/i w/o bleed  ABDOMEN: Soft, Nontender, Nondistended; Bowel sounds present  EXTREMITIES:  2+ Peripheral Pulses, No edema  PSYCH: AAOx3  NEUROLOGY: non-focal  SKIN: Warm and dry    LABS:                        7.5    9.60  )-----------( 49       ( 30 Sep 2023 07:25 )             23.4     Hgb Trend: 7.5<--, 8.1<--, 8.7<--, 7.7<--, 7.5<--      136  |  97<L>  |  30<H>  ----------------------------<  209<H>  3.7   |  29  |  3.05<H>    Ca    7.9<L>      30 Sep 2023 07:25  Phos  2.5       Mg     1.80         TPro  6.0  /  Alb  3.2<L>  /  TBili  0.7  /  DBili  x   /  AST  93<H>  /  ALT  195<H>  /  AlkPhos  641<H>      Creatinine Trend: 3.05<--, 2.23<--, 2.80<--, 2.01<--, 2.10<--, 1.57<--  LIVER FUNCTIONS - ( 30 Sep 2023 07:25 )  Alb: 3.2 g/dL / Pro: 6.0 g/dL / ALK PHOS: 641 U/L / ALT: 195 U/L / AST: 93 U/L / GGT: x                 Urinalysis Basic - ( 30 Sep 2023 07:25 )    Color: x / Appearance: x / SG: x / pH: x  Gluc: 209 mg/dL / Ketone: x  / Bili: x / Urobili: x   Blood: x / Protein: x / Nitrite: x   Leuk Esterase: x / RBC: x / WBC x   Sq Epi: x / Non Sq Epi: x / Bacteria: x        RADIOLOGY & ADDITIONAL TESTS:    Imaging Personally Reviewed.    Consultant(s) Notes Reviewed.    Care Discussed with Consultants/Other Providers.

## 2023-09-30 NOTE — PROGRESS NOTE ADULT - PROBLEM SELECTOR PLAN 1
--Likely multifactorial i/s/o acute on chronic HF +/- progressive renal failure +/- possible PNA. Lower suspicion for PE at this time  --TTE 08/23 w/ EF 55%, Mildly enlarged RV and Large pericardial effusion  --CT C/A/P w/ B/L GGO and mod B/L pleural effusions  --Pt oliguric. W/o sig improvement w/ Bumex gtt  --C/w HD for volume removal  --Dialysis dependent- will plan for tunneled cath placement per renal recs. IR Consulted, recs reconsult next week once blood cx negative. Blood cx negative 09/24 (1 bottle), F/u repeat blood cx  --Appreciate HF/Renal input  --Rest of mgt as below

## 2023-10-01 ENCOUNTER — TRANSCRIPTION ENCOUNTER (OUTPATIENT)
Age: 58
End: 2023-10-01

## 2023-10-01 DIAGNOSIS — D69.6 THROMBOCYTOPENIA, UNSPECIFIED: ICD-10-CM

## 2023-10-01 LAB
ALBUMIN SERPL ELPH-MCNC: 3.2 G/DL — LOW (ref 3.3–5)
ALP SERPL-CCNC: 637 U/L — HIGH (ref 40–120)
ALT FLD-CCNC: 165 U/L — HIGH (ref 4–33)
ANION GAP SERPL CALC-SCNC: 14 MMOL/L — SIGNIFICANT CHANGE UP (ref 7–14)
AST SERPL-CCNC: 65 U/L — HIGH (ref 4–32)
BASOPHILS # BLD AUTO: 0.04 K/UL — SIGNIFICANT CHANGE UP (ref 0–0.2)
BASOPHILS NFR BLD AUTO: 0.5 % — SIGNIFICANT CHANGE UP (ref 0–2)
BILIRUB SERPL-MCNC: 0.6 MG/DL — SIGNIFICANT CHANGE UP (ref 0.2–1.2)
BUN SERPL-MCNC: 18 MG/DL — SIGNIFICANT CHANGE UP (ref 7–23)
CALCIUM SERPL-MCNC: 8.3 MG/DL — LOW (ref 8.4–10.5)
CHLORIDE SERPL-SCNC: 98 MMOL/L — SIGNIFICANT CHANGE UP (ref 98–107)
CO2 SERPL-SCNC: 27 MMOL/L — SIGNIFICANT CHANGE UP (ref 22–31)
CREAT SERPL-MCNC: 2.2 MG/DL — HIGH (ref 0.5–1.3)
EGFR: 26 ML/MIN/1.73M2 — LOW
EOSINOPHIL # BLD AUTO: 0.3 K/UL — SIGNIFICANT CHANGE UP (ref 0–0.5)
EOSINOPHIL NFR BLD AUTO: 3.6 % — SIGNIFICANT CHANGE UP (ref 0–6)
GLUCOSE BLDC GLUCOMTR-MCNC: 119 MG/DL — HIGH (ref 70–99)
GLUCOSE BLDC GLUCOMTR-MCNC: 123 MG/DL — HIGH (ref 70–99)
GLUCOSE BLDC GLUCOMTR-MCNC: 152 MG/DL — HIGH (ref 70–99)
GLUCOSE BLDC GLUCOMTR-MCNC: 211 MG/DL — HIGH (ref 70–99)
GLUCOSE SERPL-MCNC: 108 MG/DL — HIGH (ref 70–99)
HCT VFR BLD CALC: 24.7 % — LOW (ref 34.5–45)
HGB BLD-MCNC: 7.6 G/DL — LOW (ref 11.5–15.5)
IANC: 6.44 K/UL — SIGNIFICANT CHANGE UP (ref 1.8–7.4)
IMM GRANULOCYTES NFR BLD AUTO: 1.9 % — HIGH (ref 0–0.9)
LYMPHOCYTES # BLD AUTO: 0.85 K/UL — LOW (ref 1–3.3)
LYMPHOCYTES # BLD AUTO: 10.1 % — LOW (ref 13–44)
MAGNESIUM SERPL-MCNC: 1.8 MG/DL — SIGNIFICANT CHANGE UP (ref 1.6–2.6)
MCHC RBC-ENTMCNC: 25.7 PG — LOW (ref 27–34)
MCHC RBC-ENTMCNC: 30.8 GM/DL — LOW (ref 32–36)
MCV RBC AUTO: 83.4 FL — SIGNIFICANT CHANGE UP (ref 80–100)
MONOCYTES # BLD AUTO: 0.65 K/UL — SIGNIFICANT CHANGE UP (ref 0–0.9)
MONOCYTES NFR BLD AUTO: 7.7 % — SIGNIFICANT CHANGE UP (ref 2–14)
NEUTROPHILS # BLD AUTO: 6.44 K/UL — SIGNIFICANT CHANGE UP (ref 1.8–7.4)
NEUTROPHILS NFR BLD AUTO: 76.2 % — SIGNIFICANT CHANGE UP (ref 43–77)
NRBC # BLD: 0 /100 WBCS — SIGNIFICANT CHANGE UP (ref 0–0)
NRBC # FLD: 0.02 K/UL — HIGH (ref 0–0)
PHOSPHATE SERPL-MCNC: 2 MG/DL — LOW (ref 2.5–4.5)
PLATELET # BLD AUTO: 50 K/UL — LOW (ref 150–400)
POTASSIUM SERPL-MCNC: 4 MMOL/L — SIGNIFICANT CHANGE UP (ref 3.5–5.3)
POTASSIUM SERPL-SCNC: 4 MMOL/L — SIGNIFICANT CHANGE UP (ref 3.5–5.3)
PROT SERPL-MCNC: 6.4 G/DL — SIGNIFICANT CHANGE UP (ref 6–8.3)
RBC # BLD: 2.96 M/UL — LOW (ref 3.8–5.2)
RBC # FLD: 17.1 % — HIGH (ref 10.3–14.5)
SODIUM SERPL-SCNC: 139 MMOL/L — SIGNIFICANT CHANGE UP (ref 135–145)
WBC # BLD: 8.44 K/UL — SIGNIFICANT CHANGE UP (ref 3.8–10.5)
WBC # FLD AUTO: 8.44 K/UL — SIGNIFICANT CHANGE UP (ref 3.8–10.5)

## 2023-10-01 PROCEDURE — 99223 1ST HOSP IP/OBS HIGH 75: CPT

## 2023-10-01 PROCEDURE — 99233 SBSQ HOSP IP/OBS HIGH 50: CPT

## 2023-10-01 RX ORDER — POTASSIUM PHOSPHATE, MONOBASIC POTASSIUM PHOSPHATE, DIBASIC 236; 224 MG/ML; MG/ML
15 INJECTION, SOLUTION INTRAVENOUS ONCE
Refills: 0 | Status: COMPLETED | OUTPATIENT
Start: 2023-10-01 | End: 2023-10-01

## 2023-10-01 RX ADMIN — CHLORHEXIDINE GLUCONATE 1 APPLICATION(S): 213 SOLUTION TOPICAL at 05:58

## 2023-10-01 RX ADMIN — Medication 2: at 12:08

## 2023-10-01 RX ADMIN — Medication 125 MICROGRAM(S): at 04:50

## 2023-10-01 RX ADMIN — POTASSIUM PHOSPHATE, MONOBASIC POTASSIUM PHOSPHATE, DIBASIC 62.5 MILLIMOLE(S): 236; 224 INJECTION, SOLUTION INTRAVENOUS at 14:05

## 2023-10-01 RX ADMIN — Medication 4: at 17:01

## 2023-10-01 RX ADMIN — PANTOPRAZOLE SODIUM 40 MILLIGRAM(S): 20 TABLET, DELAYED RELEASE ORAL at 05:57

## 2023-10-01 RX ADMIN — PIPERACILLIN AND TAZOBACTAM 25 GRAM(S): 4; .5 INJECTION, POWDER, LYOPHILIZED, FOR SOLUTION INTRAVENOUS at 05:57

## 2023-10-01 RX ADMIN — Medication 30 MILLIGRAM(S): at 05:57

## 2023-10-01 NOTE — DISCHARGE NOTE PROVIDER - NSDCMRMEDTOKEN_GEN_ALL_CORE_FT
atorvastatin 40 mg oral tablet: 1 tab(s) orally once a day (at bedtime)  bumetanide 2 mg oral tablet: 1 tab(s) orally 2 times a day  hydrALAZINE 100 mg oral tablet: 1 tab(s) orally every 8 hours  isosorbide dinitrate 10 mg oral tablet: 1 tab(s) orally 3 times a day  levoFLOXacin 500 mg oral tablet: 1 tab(s) orally once a day Please take on 9/2/2023.  levothyroxine 125 mcg (0.125 mg) oral tablet: 1 tab(s) orally once a day  NIFEdipine 60 mg oral tablet, extended release: 1 tab(s) orally once a day  pantoprazole 40 mg oral delayed release tablet: 1 tab(s) orally once a day (before a meal)  repaglinide 0.5 mg oral tablet: 1 tab(s) orally 3 times a day   levothyroxine 125 mcg (0.125 mg) oral tablet: 1 tab(s) orally once a day  NIFEdipine 60 mg oral tablet, extended release: 1 tab(s) orally once a day  pantoprazole 40 mg oral delayed release tablet: 1 tab(s) orally once a day (before a meal)  polyethylene glycol 3350 oral powder for reconstitution: 17 gram(s) orally once a day  repaglinide 0.5 mg oral tablet: 1 tab(s) orally 3 times a day  senna leaf extract oral tablet: 2 tab(s) orally once a day (at bedtime)  torsemide 60 mg oral tablet: 1 tab(s) orally once a day

## 2023-10-01 NOTE — DISCHARGE NOTE PROVIDER - PROVIDER TOKENS
PROVIDER:[TOKEN:[[id number]]],PROVIDER:[TOKEN:[[id number]]],PROVIDER:[TOKEN:[[id number]]]

## 2023-10-01 NOTE — PROGRESS NOTE ADULT - PROBLEM SELECTOR PLAN 3
--CT C/A/P w/ B/L GGO . Procal elevated  --S/P IV zosyn (9/24- 10/1)  --Blood Cx 9/24 w/ GPR (1 Bottle), 2nd bottle negative  --Repeat blood cx NGTD  --Trend leukocytosis- Now resolved

## 2023-10-01 NOTE — DISCHARGE NOTE PROVIDER - NSDCCPCAREPLAN_GEN_ALL_CORE_FT
PRINCIPAL DISCHARGE DIAGNOSIS  Diagnosis: Acute respiratory failure with hypoxemia  Assessment and Plan of Treatment: You presented to the hospital with shortness of breath. This was thought to be due to fluid build up in your body as a result of worsening kidney function. You were started on dialysis for fluid removal and experienced an improvement in shortness of breath. Please continue dialysis following your discharge. Please follow up with your primary care doctor and with nephrology (kidney doctors ) following your discharge.        SECONDARY DISCHARGE DIAGNOSES  Diagnosis: Pericardial effusion  Assessment and Plan of Treatment: You were found to have some fluid around your heart during your hospitalization. A tube was placed to remove or drain this fluid. You tolerated the procedure well. This tube or drain has since been removed. Please follow up with the cardiologists following your discharge for further management.      Diagnosis: Bacteremia  Assessment and Plan of Treatment: You were found to have an infection in your blood during your hospital course. You received a course of antibiotics for this infection. Please follow up with your primary care doctor for further management. Please return to the hospital should you experience any new or worsening symptoms      Diagnosis: Transaminitis  Assessment and Plan of Treatment: You were found to have elevated liver numbers likely due to a liver injury. Your kidney numbers are improving. Please follow up with a hepatologist following your discharge for further management     PRINCIPAL DISCHARGE DIAGNOSIS  Diagnosis: Acute respiratory failure with hypoxemia  Assessment and Plan of Treatment: You presented to the hospital with shortness of breath. This was thought to be due to fluid build up in your body as a result of worsening kidney function. You were started on dialysis for fluid removal and experienced an improvement in shortness of breath. Please continue dialysis following your discharge. Please follow up with your primary care doctor and with nephrology (kidney doctors ) following your discharge.        SECONDARY DISCHARGE DIAGNOSES  Diagnosis: Pericardial effusion  Assessment and Plan of Treatment: You were found to have some fluid around your heart during your hospitalization. A tube was placed to remove or drain this fluid. You tolerated the procedure well. This tube or drain has since been removed. Please follow up with the cardiologists following your discharge for further management.      Diagnosis: Transaminitis  Assessment and Plan of Treatment: You were found to have elevated liver numbers likely due to a liver injury. Your kidney numbers are improving. Please follow up with a hepatologist following your discharge for further management    Diagnosis: Bacteremia  Assessment and Plan of Treatment: You were found to have an infection in your blood during your hospital course. You received a course of antibiotics for this infection. Please follow up with your primary care doctor for further management. Please return to the hospital should you experience any new or worsening symptoms      Diagnosis: ESRD on hemodialysis  Assessment and Plan of Treatment: To improve quality of life and reduce mortality by preventing fluid overload and electrolytes abnormalities, and blood pressure control.   Please follow up with your nephrologist for management, and continue you schedule hemodialysis.

## 2023-10-01 NOTE — PROGRESS NOTE ADULT - PROBLEM SELECTOR PLAN 1
Problem: GI Endoscopy (Adult)  Goal: Signs and Symptoms of Listed Potential Problems Will be Absent or Manageable (GI Endoscopy)    02/22/17 0914   GI Endoscopy   Problems Present (GI Endoscopy) none            --Likely multifactorial i/s/o acute on chronic HF +/- progressive renal failure +/- possible PNA. Lower suspicion for PE at this time  --TTE 08/23 w/ EF 55%, Mildly enlarged RV and Large pericardial effusion  --CT C/A/P w/ B/L GGO and mod B/L pleural effusions  --Pt oliguric. W/o sig improvement w/ Bumex gtt  --C/w HD for volume removal  --Dialysis dependent- will plan for tunneled cath placement per renal recs. IR Consulted, recs reconsult next week once blood cx negative. Blood cx negative 09/24 (1 bottle),  repeat blood cx also NGTD  --Appreciate HF/Renal input  --Rest of mgt as below

## 2023-10-01 NOTE — DISCHARGE NOTE PROVIDER - CARE PROVIDERS DIRECT ADDRESSES
,[email or web address]

## 2023-10-01 NOTE — PROGRESS NOTE ADULT - PROBLEM SELECTOR PLAN 4
Noted. Suspect i/s/o abx +/- infection. Diff incl HIT  --d/c abx  --Heparin discontinued  --Obtain BETZY, Hep Ab  --Obatin Blue top in the AM  --Obtain Serum fibrinogen /coags  --Heme consulted given intermediate probability of HIT

## 2023-10-01 NOTE — DISCHARGE NOTE PROVIDER - CARE PROVIDER_API CALL
Jonel Coyle  Nephrology  [address] [place] [address]  Phone: [phone]  Fax: [phone]  Follow Up Time:     La Nena Gay  Cardiovascular Disease  [address] [place] [address]  Phone: [phone]  Fax: [phone]  Follow Up Time:     Courtney Dao  Pulmonary Disease  [address] [place] [address]  Phone: [phone]  Fax: [phone]  Follow Up Time:

## 2023-10-01 NOTE — PROGRESS NOTE ADULT - PROBLEM SELECTOR PLAN 6
--Likely 2/2 anemia of chronic disease + Acute blood loss anemia i/s/o recent bleed from pericardial drain site  --Anemia w/o c/w ACD  --Monitor Hgb  --Transfuse Hgb < 7

## 2023-10-01 NOTE — PROGRESS NOTE ADULT - SUBJECTIVE AND OBJECTIVE BOX
Leah Cadena        Patient is a 58y old  Female who presents with a chief complaint of progressive dyspnea (01 Oct 2023 09:48)      SUBJECTIVE / OVERNIGHT EVENTS: No acute overnight events. This morning pt doing well. States diarrhea improving. Denies fevers, chills, chest pain, SOB, bleeding.     MEDICATIONS  (STANDING):  chlorhexidine 2% Cloths 1 Application(s) Topical <User Schedule>  dextrose 5%. 1000 milliLiter(s) (50 mL/Hr) IV Continuous <Continuous>  dextrose 5%. 1000 milliLiter(s) (100 mL/Hr) IV Continuous <Continuous>  dextrose 50% Injectable 12.5 Gram(s) IV Push once  dextrose 50% Injectable 25 Gram(s) IV Push once  dextrose 50% Injectable 25 Gram(s) IV Push once  glucagon  Injectable 1 milliGRAM(s) IntraMuscular once  insulin lispro (ADMELOG) corrective regimen sliding scale   SubCutaneous three times a day before meals  insulin lispro (ADMELOG) corrective regimen sliding scale   SubCutaneous at bedtime  levothyroxine 125 MICROGram(s) Oral daily  NIFEdipine XL 30 milliGRAM(s) Oral daily  pantoprazole    Tablet 40 milliGRAM(s) Oral before breakfast  polyethylene glycol 3350 17 Gram(s) Oral daily  senna 2 Tablet(s) Oral at bedtime    MEDICATIONS  (PRN):  dextrose Oral Gel 15 Gram(s) Oral once PRN Blood Glucose LESS THAN 70 milliGRAM(s)/deciliter  melatonin 3 milliGRAM(s) Oral at bedtime PRN Insomnia    Allergies    No Known Allergies    Intolerances        Vital Signs Last 24 Hrs  T(C): 36.6 (01 Oct 2023 11:53), Max: 36.8 (01 Oct 2023 08:38)  T(F): 97.9 (01 Oct 2023 11:53), Max: 98.2 (01 Oct 2023 08:38)  HR: 75 (01 Oct 2023 11:53) (71 - 88)  BP: 158/63 (01 Oct 2023 11:53) (118/65 - 158/63)  BP(mean): --  RR: 18 (01 Oct 2023 11:53) (18 - 18)  SpO2: 97% (01 Oct 2023 11:53) (90% - 100%)    Parameters below as of 01 Oct 2023 11:53  Patient On (Oxygen Delivery Method): nasal cannula      Daily     Daily Weight in k.9 (01 Oct 2023 06:47)  CAPILLARY BLOOD GLUCOSE      POCT Blood Glucose.: 152 mg/dL (01 Oct 2023 12:02)  POCT Blood Glucose.: 119 mg/dL (01 Oct 2023 08:04)  POCT Blood Glucose.: 141 mg/dL (30 Sep 2023 22:11)  POCT Blood Glucose.: 280 mg/dL (30 Sep 2023 16:59)    I&O's Summary    30 Sep 2023 07:01  -  01 Oct 2023 07:00  --------------------------------------------------------  IN: 760 mL / OUT: 2400 mL / NET: -1640 mL        PHYSICAL EXAM:  GENERAL: NAD  HEENT:  Atraumatic, Normocephalic, EOMI, conjunctiva and sclera clear  NECK: Supple, R IJ Non tunneled cath (dressing c/d/i)  CHEST/LUNG: Clear to auscultation bilaterally; No wheeze, crackles or rhonchi   HEART: Regular rate and rhythm; Normal S1 S2, No murmurs, rubs, or gallops. s/p pericardial drain removal , site c/d/i w/o bleed  ABDOMEN: Soft, Nontender, Nondistended; Bowel sounds present  EXTREMITIES:  2+ Peripheral Pulses, No edema  PSYCH: AAOx3  NEUROLOGY: non-focal  SKIN: Warm and dry      LABS:                        7.6    8.44  )-----------( 50       ( 01 Oct 2023 06:38 )             24.7     Hgb Trend: 7.6<--, 7.5<--, 8.1<--, 8.7<--, 7.7<--  10-01    139  |  98  |  18  ----------------------------<  108<H>  4.0   |  27  |  2.20<H>    Ca    8.3<L>      01 Oct 2023 06:38  Phos  2.0     10-  Mg     1.80     10-    TPro  6.4  /  Alb  3.2<L>  /  TBili  0.6  /  DBili  x   /  AST  65<H>  /  ALT  165<H>  /  AlkPhos  637<H>  10-    Creatinine Trend: 2.20<--, 3.05<--, 2.23<--, 2.80<--, 2.01<--, 2.10<--  LIVER FUNCTIONS - ( 01 Oct 2023 06:38 )  Alb: 3.2 g/dL / Pro: 6.4 g/dL / ALK PHOS: 637 U/L / ALT: 165 U/L / AST: 65 U/L / GGT: x                 Urinalysis Basic - ( 01 Oct 2023 06:38 )    Color: x / Appearance: x / SG: x / pH: x  Gluc: 108 mg/dL / Ketone: x  / Bili: x / Urobili: x   Blood: x / Protein: x / Nitrite: x   Leuk Esterase: x / RBC: x / WBC x   Sq Epi: x / Non Sq Epi: x / Bacteria: x        RADIOLOGY & ADDITIONAL TESTS:    Imaging Personally Reviewed.    Consultant(s) Notes Reviewed.    Care Discussed with Consultants/Other Providers.

## 2023-10-01 NOTE — DISCHARGE NOTE PROVIDER - NSDCFUADDAPPT_GEN_ALL_CORE_FT
Follow up with your primary care doctor, cardiologist and nephrologist.    Continue to new dialysis as prescribed.

## 2023-10-01 NOTE — DISCHARGE NOTE PROVIDER - HOSPITAL COURSE
58F with CKD, severe pHTN, HFpEF presented w/ SOB , admitted for AHRF likely 2/2  volume overload admitted to MICU for urgent HD w/ course c/b obstructive shock in the setting of pericardial tamponade now s/p pericardial drain placement, transferred to medicine for further management. Pericardial drain output monitored, now s/p drain removal. Course c/b transaminitis likely ischemic i/s/o shock , now improving. Further c/b GPR bacteremia -(unclear if contaminant) and possible PNA now s/p abx. Course further c/b thrombocytopenia likely 2/2 infx vs. abx. vs HIT. Heme consulted given c/f HIT-recs pending. Renal recs tunneled cath placement as pt now deemed to be dialysis dependent , IR consulted, recs reconsult next week when cx negative. Cx obtained 9/28 NGTD. 59 y/o Female, with a PmHx of CKD, severe pHTN, HFpEF, presented to the [facility] ED w/ SOB. Admitted for AHRF likely 2/2 volume overload to MICU for urgent HD w/ course c/b obstructive shock in the setting of pericardial tamponade now s/p pericardial drain placement, transferred to medicine for further management. Pericardial drain output monitored, now s/p drain removal. Course c/b transaminitis likely ischemic i/s/o shock , now improving. Further c/b GPR bacteremia - (unclear if contaminant) and possible PNA now s/p abx. Course further c/b thrombocytopenia likely 2/2 infx vs. abx. vs HIT. Heme consulted given c/f HIT-recs pending. Renal recs tunneled cath placement as pt now deemed to be dialysis dependent , IR consulted. Cx obtained 9/28 NGTD. PermCath tunneled catheter placed 10/5 and completed a session of HD on 10/6 without complications. Pt is now medically cleared for discharge home and is setup to start HD at [facility] [address] [place] [address] Phone: [phone] Fax: [phone]. Dialysis scheduled for Mon/wed/Fri at 7pm. Admission set for Monday 10/9 at 2:30pm.

## 2023-10-01 NOTE — DISCHARGE NOTE PROVIDER - NSDCFUSCHEDAPPT_GEN_ALL_CORE_FT
Jonel Coyle  [facility] Physician [facility]  NEPHRO [address]   Scheduled Appointment: 10/20/2023    La Nena Gay  [facility]  CARDIOLOGY [address]  Scheduled Appointment: 11/21/2023    Courtney Dao  [facility] Physician [facility]  PULMMED [address] [facility]  Scheduled Appointment: 11/28/2023     Jonel Coyle  [facility] Physician [facility]  NEPHRO [address]   Scheduled Appointment: 10/20/2023    La Nena Gay  [facility]  CARDIOLOGY [address]  Scheduled Appointment: 11/21/2023    Courtney Dao  [facility] Physician [facility]  PULMMED [address]  Scheduled Appointment: 11/28/2023    [facility]  ENDOCRIN [address] [facility]  Scheduled Appointment: 01/02/2024

## 2023-10-01 NOTE — CONSULT NOTE ADULT - SUBJECTIVE AND OBJECTIVE BOX
HEMATOLOGY ONCOLOGY CONSULT     Patient is a 58y old  Female who presents with a chief complaint of progressive dyspnea (30 Sep 2023 15:18)      HPI:  Ms. 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 She reports this morning she had acute shortness of breath at rest with associated weakness. She reports having left sided chest tightness that worsened when laying flat and in the decubitus position. She reports SOB on exertion as well. She reports over last few days she has had non-productive cough, decrease PO intake, nausea this morning, and diarrhea (without blood) this morning. She reports not eating much over the last few days with decrease PO intake. She reports taking her bumex today (both AM and PM). She was placed on BIPAP in the ED. CXR was sig for the following: small bilateral pleural effusions with prominent interstitial markings bilaterally. Labs were sig for the following: BUN/Cr 110/3.92, Na 122 (last 133 in August), CO2 15, AG 20, -290, d-dimer 3509, WBC 9.19, Hb 8.9/26.7, (8.2/1245), MCV 75.4, RDW 14.6, neutrophil count 87.1%, alk phos 1093, pro-BNP 2092, LA 0.9, pCO2 36. She denies any abdominal pain, dysuria, urinary frequency, fever or chills.  (21 Sep 2023 05:13)       ROS:  Negative except for:    PAST MEDICAL & SURGICAL HISTORY:  HTN (hypertension)      HLD (hyperlipidemia)      DM (diabetes mellitus)      Hypothyroid      H/O pulmonary hypertension      CKD (chronic kidney disease)      (HFpEF) heart failure with preserved ejection fraction      S/P cataract surgery          SOCIAL HISTORY:    FAMILY HISTORY:  FH: stroke (Father)        MEDICATIONS  (STANDING):  chlorhexidine 2% Cloths 1 Application(s) Topical <User Schedule>  dextrose 5%. 1000 milliLiter(s) (50 mL/Hr) IV Continuous <Continuous>  dextrose 5%. 1000 milliLiter(s) (100 mL/Hr) IV Continuous <Continuous>  dextrose 50% Injectable 25 Gram(s) IV Push once  dextrose 50% Injectable 12.5 Gram(s) IV Push once  dextrose 50% Injectable 25 Gram(s) IV Push once  glucagon  Injectable 1 milliGRAM(s) IntraMuscular once  insulin lispro (ADMELOG) corrective regimen sliding scale   SubCutaneous at bedtime  insulin lispro (ADMELOG) corrective regimen sliding scale   SubCutaneous three times a day before meals  levothyroxine 125 MICROGram(s) Oral daily  NIFEdipine XL 30 milliGRAM(s) Oral daily  pantoprazole    Tablet 40 milliGRAM(s) Oral before breakfast  piperacillin/tazobactam IVPB.. 3.375 Gram(s) IV Intermittent every 12 hours  polyethylene glycol 3350 17 Gram(s) Oral daily  senna 2 Tablet(s) Oral at bedtime    MEDICATIONS  (PRN):  dextrose Oral Gel 15 Gram(s) Oral once PRN Blood Glucose LESS THAN 70 milliGRAM(s)/deciliter  melatonin 3 milliGRAM(s) Oral at bedtime PRN Insomnia      Allergies    No Known Allergies    Intolerances        Vital Signs Last 24 Hrs  T(C): 36.4 (01 Oct 2023 04:46), Max: 36.8 (30 Sep 2023 10:38)  T(F): 97.5 (01 Oct 2023 04:46), Max: 98.2 (30 Sep 2023 10:38)  HR: 78 (01 Oct 2023 04:46) (71 - 78)  BP: 127/59 (01 Oct 2023 04:46) (116/53 - 148/61)  BP(mean): --  RR: 18 (01 Oct 2023 04:46) (16 - 18)  SpO2: 90% (01 Oct 2023 04:46) (90% - 100%)    Parameters below as of 01 Oct 2023 04:46  Patient On (Oxygen Delivery Method): room air        PHYSICAL EXAM  General: adult in NAD  HEENT: clear oropharynx, anicteric sclera, pink conjunctiva  Neck: supple  CV: normal S1/S2 with no murmur rubs or gallops  Lungs: positive air movement b/l ant lungs,clear to auscultation, no wheezes, no rales  Abdomen: soft non-tender non-distended, no hepatosplenomegaly  Ext: no clubbing cyanosis or edema  Skin: no rashes and no petechiae  Neuro: alert and oriented X 4, no focal deficits      09-30-23 @ 07:01  -  10-01-23 @ 07:00  --------------------------------------------------------  IN: 760 mL / OUT: 2400 mL / NET: -1640 mL      LABS:                          7.6    8.44  )-----------( 50       ( 01 Oct 2023 06:38 )             24.7         Mean Cell Volume : 83.4 fL  Mean Cell Hemoglobin : 25.7 pg  Mean Cell Hemoglobin Concentration : 30.8 gm/dL  Auto Neutrophil # : 6.44 K/uL  Auto Lymphocyte # : 0.85 K/uL  Auto Monocyte # : 0.65 K/uL  Auto Eosinophil # : 0.30 K/uL  Auto Basophil # : 0.04 K/uL  Auto Neutrophil % : 76.2 %  Auto Lymphocyte % : 10.1 %  Auto Monocyte % : 7.7 %  Auto Eosinophil % : 3.6 %  Auto Basophil % : 0.5 %      10-01    139  |  98  |  18  ----------------------------<  108<H>  4.0   |  27  |  2.20<H>    Ca    8.3<L>      01 Oct 2023 06:38  Phos  2.0     10-01  Mg     1.80     10-01    TPro  6.4  /  Alb  3.2<L>  /  TBili  0.6  /  DBili  x   /  AST  65<H>  /  ALT  165<H>  /  AlkPhos  637<H>  10-01                      BLOOD SMEAR INTERPRETATION:       RADIOLOGY & ADDITIONAL STUDIES:       HEMATOLOGY ONCOLOGY CONSULT     Patient is a 58y old  Female who presents with a chief complaint of progressive dyspnea (30 Sep 2023 15:18)      HPI:  Ms. Hernandez is a 57 yo F w/ HTN, HLD, DM2, CKD (stage 3-4), hypothyroidism (c/b myxedema coma in past), severe pulmonary hypertension, mod-severe TR, asthma, HFpEF, presents for progressive dyspnea at rest with left sided chest tightness that started this morning. She reports this morning she had acute shortness of breath at rest with associated weakness. She reports having left sided chest tightness that worsened when laying flat and in the decubitus position. She reports SOB on exertion as well. She reports over last few days she has had non-productive cough, decrease PO intake, nausea this morning, and diarrhea (without blood) this morning. She reports not eating much over the last few days with decrease PO intake. She reports taking her bumex today (both AM and PM). She was placed on BIPAP in the ED. CXR was sig for the following: small bilateral pleural effusions with prominent interstitial markings bilaterally. Labs were sig for the following: BUN/Cr 110/3.92, Na 122 (last 133 in August), CO2 15, AG 20, -290, d-dimer 3509, WBC 9.19, Hb 8.9/26.7, (8.2/1245), MCV 75.4, RDW 14.6, neutrophil count 87.1%, alk phos 1093, pro-BNP 2092, LA 0.9, pCO2 36. She denies any abdominal pain, dysuria, urinary frequency, fever or chills.  (21 Sep 2023 05:13)    Hematology History:    Patient does not follow with a hematologist as an outpatient. States that she is unaware of any problems with her platelets before. Denies any easy bleeding or bruising or any gingival bleeding. Denies any history of easy bleeding during her pregnancies or childhood. Was previously in the hospital in Aug 2023 and was on heparin during that time and did not have thrombocytopenia. Baseline appears to be from 150K-200K however in Jan-Feb 2023 patient did have a period where she was thrombocytopenic to 50-80K and this was post ICU stay for myxedema coma in Dec 2022.        ROS:  Negative except for:    PAST MEDICAL & SURGICAL HISTORY:  HTN (hypertension)      HLD (hyperlipidemia)      DM (diabetes mellitus)      Hypothyroid      H/O pulmonary hypertension      CKD (chronic kidney disease)      (HFpEF) heart failure with preserved ejection fraction      S/P cataract surgery          SOCIAL HISTORY:    FAMILY HISTORY:  FH: stroke (Father)        MEDICATIONS  (STANDING):  chlorhexidine 2% Cloths 1 Application(s) Topical <User Schedule>  dextrose 5%. 1000 milliLiter(s) (50 mL/Hr) IV Continuous <Continuous>  dextrose 5%. 1000 milliLiter(s) (100 mL/Hr) IV Continuous <Continuous>  dextrose 50% Injectable 25 Gram(s) IV Push once  dextrose 50% Injectable 12.5 Gram(s) IV Push once  dextrose 50% Injectable 25 Gram(s) IV Push once  glucagon  Injectable 1 milliGRAM(s) IntraMuscular once  insulin lispro (ADMELOG) corrective regimen sliding scale   SubCutaneous at bedtime  insulin lispro (ADMELOG) corrective regimen sliding scale   SubCutaneous three times a day before meals  levothyroxine 125 MICROGram(s) Oral daily  NIFEdipine XL 30 milliGRAM(s) Oral daily  pantoprazole    Tablet 40 milliGRAM(s) Oral before breakfast  piperacillin/tazobactam IVPB.. 3.375 Gram(s) IV Intermittent every 12 hours  polyethylene glycol 3350 17 Gram(s) Oral daily  senna 2 Tablet(s) Oral at bedtime    MEDICATIONS  (PRN):  dextrose Oral Gel 15 Gram(s) Oral once PRN Blood Glucose LESS THAN 70 milliGRAM(s)/deciliter  melatonin 3 milliGRAM(s) Oral at bedtime PRN Insomnia      Allergies    No Known Allergies    Intolerances        Vital Signs Last 24 Hrs  T(C): 36.4 (01 Oct 2023 04:46), Max: 36.8 (30 Sep 2023 10:38)  T(F): 97.5 (01 Oct 2023 04:46), Max: 98.2 (30 Sep 2023 10:38)  HR: 78 (01 Oct 2023 04:46) (71 - 78)  BP: 127/59 (01 Oct 2023 04:46) (116/53 - 148/61)  BP(mean): --  RR: 18 (01 Oct 2023 04:46) (16 - 18)  SpO2: 90% (01 Oct 2023 04:46) (90% - 100%)    Parameters below as of 01 Oct 2023 04:46  Patient On (Oxygen Delivery Method): room air        PHYSICAL EXAM  HEENT:  Atraumatic, Normocephalic, EOMI, conjunctiva and sclera clear  NECK: Supple, R IJ Non tunneled cath (dressing c/d/i)  CHEST/LUNG: Clear to auscultation bilaterally; No wheeze, crackles or rhonchi   HEART: Regular rate and rhythm; Normal S1 S2, No murmurs, rubs, or gallops. s/p pericardial drain removal , site c/d/i w/o bleed  ABDOMEN: Soft, Nontender, Nondistended; Bowel sounds present  EXTREMITIES:  2+ Peripheral Pulses, No edema  PSYCH: AAOx3  NEUROLOGY: non-focal  SKIN: Warm and dry    09-30-23 @ 07:01  -  10-01-23 @ 07:00  --------------------------------------------------------  IN: 760 mL / OUT: 2400 mL / NET: -1640 mL      LABS:                          7.6    8.44  )-----------( 50       ( 01 Oct 2023 06:38 )             24.7         Mean Cell Volume : 83.4 fL  Mean Cell Hemoglobin : 25.7 pg  Mean Cell Hemoglobin Concentration : 30.8 gm/dL  Auto Neutrophil # : 6.44 K/uL  Auto Lymphocyte # : 0.85 K/uL  Auto Monocyte # : 0.65 K/uL  Auto Eosinophil # : 0.30 K/uL  Auto Basophil # : 0.04 K/uL  Auto Neutrophil % : 76.2 %  Auto Lymphocyte % : 10.1 %  Auto Monocyte % : 7.7 %  Auto Eosinophil % : 3.6 %  Auto Basophil % : 0.5 %      10-01    139  |  98  |  18  ----------------------------<  108<H>  4.0   |  27  |  2.20<H>    Ca    8.3<L>      01 Oct 2023 06:38  Phos  2.0     10-01  Mg     1.80     10-01    TPro  6.4  /  Alb  3.2<L>  /  TBili  0.6  /  DBili  x   /  AST  65<H>  /  ALT  165<H>  /  AlkPhos  637<H>  10-01                          RADIOLOGY & ADDITIONAL STUDIES:  FINDINGS:  CHEST:  LUNGS AND LARGE AIRWAYS: Patent central airways. Left lower lobe collapse   and subsegmental atelectasis of lingula and right lower lobe. Aerated   portion of the bilateral lungs demonstrating groundglass opacities  PLEURA: Moderate bilateral pleural effusions, right worse than left.  VESSELS: Bilateral central lines terminating in the SVC/cavoatrial   junction.  HEART: Heart size is normal. Subxiphoid pericardial drain and interval   decrease of pericardial effusion compared to prior.  MEDIASTINUM AND ANJELICA: No lymphadenopathy.  CHEST WALL AND LOWER NECK: Within normal limits.    ABDOMEN/PELVIS:  LIVER: Within normal limits.  BILE DUCTS: Normal caliber.  GALLBLADDER: Cholelithiasis.  SPLEEN: Within normal limits.  PANCREAS: Within normal limits.  ADRENALS: Within normal limits.  KIDNEYS/URETERS: Within normal limits.    BLADDER: Mild distention with Singh catheter.  REPRODUCTIVE ORGANS: Uterus and adnexa within normal limits.    BOWEL: Mural thickening of the stomach, small bowel, and colon. No   abnormal hypo-/nonenhancement of wall of the GI tract. No pneumatosis or   pneumoperitoneum. Normal appendix No bowel obstruction.  PERITONEUM: Interval increase in small ascites.  VESSELS: Patent mesenteric vasculature, though mild stenosis of the FREDA.   Atherosclerotic changes. Similar dilatation of hepatic veins and IVC.  RETROPERITONEUM/LYMPH NODES: No lymphadenopathy.  ABDOMINAL WALL: Subcutaneous soft tissue edema. Soft tissue nodules in   the anterior abdomen likely injection sites.  BONES: Degenerative changes.    IMPRESSION:  1.  Mural thickening of the stomach, small bowel, and colon. Findings may   be secondary to fluid overload state versus enterocolitis.  2.  No evidence of mesenteric ischemia or acute GI bleed.  3.  Moderate bilateral pleural effusions, interval increase of small   ascites and new anasarca, suggestive of fluid overload state.  4.  Subxiphoid pericardial drain with interval decrease of pericardial   effusion.

## 2023-10-01 NOTE — CONSULT NOTE ADULT - ASSESSMENT
#Thrombocytopenia:      58F with CKD, severe pHTN, HFpEF presented w/ SOB , admitted for AHRF likely 2/2  volume overload admitted to MICU for urgent HD w/ course c/b obstructive shock in the setting of uremic pericardial tamponade now s/p pericardial drain placement and removal and transferred to medicine floors for further management. Hematology consulted for thrombocytopenia and c/f HIT.       #Thrombocytopenia: 57 y/o with chronic comorbidities initially presented for worsening dyspnea on exertion found to be in a volume overload state and needed transfer to ICU i/s/o urgent HD for volume removal. In the ICU her course was complicated by obstructive shock and she was found to have a pericardial effusion that required a pericardial drain placement. The etiology of the pericardial effusion was thought to be 2/2 uremic pericarditis. She is now hemodynamically stable and transferred to the floor for further evaluation. When she initially presented to the hospital on 09/20/23 her platelet count at that time was 307K, her first couple of days in the hospital her platelet count remained steady from 200-250K during the dates of 09/21-09/24. On 09/25 patient's platelets dropped to 141K and since then they have steadily declined to a viraj of 49K on 09/30. She has no bleeding stigmata on clinical exam or subjective history.  Her coags last checked on 09/27 showed a PT of 14.0, INR of 1.25, and a fibrinogen of 277 on 09/26. Her WBC count is normal. She has a normocytic anemia most likely in the setting of her frequent phlebotomy, critical illness/infx, and CKD. The team is asking if she has HIT. Her 4T score is 5 (2 points for platelet fall, 2 points for timing of fall, 1 point for possible other causes of thrombosis) placing her at intermediate risk (~14%). Of note she was challenged with heparin in Aug 2023 ~30 days prior this current viraj and her fall in platelets during this current timing correlated between days 5-10 when usually with re-exposure platelet falls generally happen a bit quicker. While the patient will be worked up for HIT other etiologies on the differential include BM suppression in the setting of critical illness and infx (patient was being treated with Pip-Tazo for presumed PNA), possible drug induced thrombocytopenia from the Pip-Tazo as there are case reports of Pip-Tazo causing thrombocytopenia independent of infx induced suppression of BM through unclear mechanisms.     Recommendations:  -Agree with HIT AB and BETZY w/u. Would hold all heparin products at this point. Will also hold treating with an alternative anticoagulant currently.   -Recommend repeat LE duplex and would also recommend UE duplex given patient with indwelling dialysis catheter placed to r/o any current clots. If patient found to have acute clot will have to use argatroban as the anticoagulation of choice at this time.   -Can check PT-INR, aPTT, Fibrinogen, HIV, acute hepatitis panel (hepatitis B core ab total, and Hep B surface ab) w/ am labs as well.   -Hematology to follow    Plan d/w Dr. Figueroa and patient at the bedside.  57 yo F with CKD, severe pHTN, HFpEF presented w/ SOB , admitted for AHRF likely 2/2  volume overload admitted to MICU for urgent HD w/ course c/b obstructive shock in the setting of uremic pericardial tamponade now s/p pericardial drain placement and removal and transferred to medicine floors for further management. Hematology consulted for thrombocytopenia and c/f HIT.       #Thrombocytopenia: 59 y/o with chronic comorbidities initially presented for worsening dyspnea on exertion found to be in a volume overload state and needed transfer to ICU i/s/o urgent HD for volume removal. In the ICU her course was complicated by obstructive shock and she was found to have a pericardial effusion that required a pericardial drain placement. The etiology of the pericardial effusion was thought to be 2/2 uremic pericarditis. She is now hemodynamically stable and transferred to the floor for further evaluation. When she initially presented to the hospital on 09/20/23 her platelet count at that time was 307K, her first couple of days in the hospital her platelet count remained steady from 200-250K during the dates of 09/21-09/24. On 09/25 patient's platelets dropped to 141K and since then they have steadily declined to a viraj of 49K on 09/30. She has no bleeding stigmata on clinical exam or subjective history.  Her coags last checked on 09/27 showed a PT of 14.0, INR of 1.25, and a fibrinogen of 277 on 09/26. Her WBC count is normal. She has a normocytic anemia most likely in the setting of her frequent phlebotomy, critical illness/infx, and CKD. The team is asking if she has HIT. Her 4T score is 5 (2 points for platelet fall, 2 points for timing of fall, 1 point for possible other causes of thrombosis) placing her at intermediate risk (~14%). Of note she was challenged with heparin in Aug 2023 ~30 days prior this current viraj and her fall in platelets during this current timing correlated between days 5-10 when usually with re-exposure platelet falls generally happen a bit quicker. While the patient will be worked up for HIT other etiologies on the differential include BM suppression in the setting of critical illness and infx (patient was being treated with Pip-Tazo for presumed PNA), possible drug induced thrombocytopenia from the Pip-Tazo as there are case reports of Pip-Tazo causing thrombocytopenia independent of infx induced suppression of BM through unclear mechanisms.     Recommendations:  -Agree with HIT AB and BETZY w/u. Would hold all heparin products at this point. Will also hold treating with an alternative anticoagulant currently.   -Recommend repeat LE duplex and would also recommend UE duplex given patient with indwelling dialysis catheter placed to r/o any current clots. If patient found to have acute clot will have to use argatroban as the anticoagulation of choice at this time.   -Can check PT-INR, aPTT, Fibrinogen, HIV, acute hepatitis panel (hepatitis B core ab total, and Hep B surface ab) w/ am labs as well.   -Hematology to follow    Plan d/w Dr. Figueroa and patient at the bedside.

## 2023-10-01 NOTE — CONSULT NOTE ADULT - ATTENDING COMMENTS
57 yo F with CKD, severe pHTN, HFpEF presented w/ SOB, admitted for AHRF likely 2/2  volume overload admitted to MICU for urgent HD w/ course c/b obstructive shock in the setting of uremic pericardial tamponade now s/p pericardial drain placement and removal and transferred to medicine floors for further management. Hematology consulted for thrombocytopenia. Would evaluate for HIT and check UE and LE dopplers to rule out DVT. She had similar course of thrombocytopenia following ICU stay in past, so would continue to trend CBC as thrombocytopenia may resolve as she continues to improve clinically.
Pt. with ISAMAR on CKD and hyponatremia. Scr elevated at 3.76 and SNa low at 121 today. Assessment and plan for ISAMAR and hyponatremia as outlined above. Monitor labs and urine output. Avoid any potential nephrotoxins. Dose medications as per eGFR.

## 2023-10-01 NOTE — PROGRESS NOTE ADULT - PROBLEM SELECTOR PLAN 5
--Pt w/ new diarrhea suspect 2/2 antibiotics  --Now improving  --s/p Abx  --F/u GI PCR/ O/P  --F/u stool cx   --Monitor stool count

## 2023-10-02 LAB
ALBUMIN SERPL ELPH-MCNC: 3.4 G/DL — SIGNIFICANT CHANGE UP (ref 3.3–5)
ALP SERPL-CCNC: 609 U/L — HIGH (ref 40–120)
ALT FLD-CCNC: 138 U/L — HIGH (ref 4–33)
ANION GAP SERPL CALC-SCNC: 12 MMOL/L — SIGNIFICANT CHANGE UP (ref 7–14)
APTT BLD: 37.9 SEC — HIGH (ref 24.5–35.6)
AST SERPL-CCNC: 52 U/L — HIGH (ref 4–32)
BASOPHILS # BLD AUTO: 0.03 K/UL — SIGNIFICANT CHANGE UP (ref 0–0.2)
BASOPHILS NFR BLD AUTO: 0.3 % — SIGNIFICANT CHANGE UP (ref 0–2)
BILIRUB SERPL-MCNC: 0.6 MG/DL — SIGNIFICANT CHANGE UP (ref 0.2–1.2)
BUN SERPL-MCNC: 21 MG/DL — SIGNIFICANT CHANGE UP (ref 7–23)
CALCIUM SERPL-MCNC: 8.6 MG/DL — SIGNIFICANT CHANGE UP (ref 8.4–10.5)
CHLORIDE SERPL-SCNC: 98 MMOL/L — SIGNIFICANT CHANGE UP (ref 98–107)
CLOSURE TME COLL+EPINEP BLD: 52 K/UL — LOW (ref 150–400)
CMV DNA CSF QL NAA+PROBE: 46 IU/ML — HIGH
CMV DNA SPEC NAA+PROBE-LOG#: 1.67 LOG10IU/ML — HIGH
CO2 SERPL-SCNC: 24 MMOL/L — SIGNIFICANT CHANGE UP (ref 22–31)
CREAT SERPL-MCNC: 2.51 MG/DL — HIGH (ref 0.5–1.3)
CULTURE RESULTS: SIGNIFICANT CHANGE UP
CULTURE RESULTS: SIGNIFICANT CHANGE UP
EBV EA AB SER IA-ACNC: >150 U/ML — HIGH
EBV EA AB TITR SER IF: POSITIVE
EBV EA IGG SER-ACNC: POSITIVE
EBV NA IGG SER IA-ACNC: >600 U/ML — HIGH
EBV PATRN SPEC IB-IMP: SIGNIFICANT CHANGE UP
EBV VCA IGG AVIDITY SER QL IA: POSITIVE
EBV VCA IGM SER IA-ACNC: 126 U/ML — HIGH
EBV VCA IGM SER IA-ACNC: <10 U/ML — SIGNIFICANT CHANGE UP
EBV VCA IGM TITR FLD: NEGATIVE — SIGNIFICANT CHANGE UP
EGFR: 22 ML/MIN/1.73M2 — LOW
EOSINOPHIL # BLD AUTO: 0.28 K/UL — SIGNIFICANT CHANGE UP (ref 0–0.5)
EOSINOPHIL NFR BLD AUTO: 3.1 % — SIGNIFICANT CHANGE UP (ref 0–6)
GLUCOSE BLDC GLUCOMTR-MCNC: 132 MG/DL — HIGH (ref 70–99)
GLUCOSE BLDC GLUCOMTR-MCNC: 143 MG/DL — HIGH (ref 70–99)
GLUCOSE BLDC GLUCOMTR-MCNC: 146 MG/DL — HIGH (ref 70–99)
GLUCOSE BLDC GLUCOMTR-MCNC: 240 MG/DL — HIGH (ref 70–99)
GLUCOSE SERPL-MCNC: 107 MG/DL — HIGH (ref 70–99)
HAV IGM SER-ACNC: SIGNIFICANT CHANGE UP
HBV CORE IGM SER-ACNC: SIGNIFICANT CHANGE UP
HBV SURFACE AG SER-ACNC: SIGNIFICANT CHANGE UP
HCT VFR BLD CALC: 26.3 % — LOW (ref 34.5–45)
HCV AB S/CO SERPL IA: 0.08 S/CO — SIGNIFICANT CHANGE UP (ref 0–0.99)
HCV AB SERPL-IMP: SIGNIFICANT CHANGE UP
HEPARIN-PF4 AB RESULT: <0.6 U/ML — SIGNIFICANT CHANGE UP (ref 0–0.9)
HGB BLD-MCNC: 8.1 G/DL — LOW (ref 11.5–15.5)
HIV 1+2 AB+HIV1 P24 AG SERPL QL IA: SIGNIFICANT CHANGE UP
IANC: 6.81 K/UL — SIGNIFICANT CHANGE UP (ref 1.8–7.4)
IMM GRANULOCYTES NFR BLD AUTO: 2.5 % — HIGH (ref 0–0.9)
INR BLD: 0.99 RATIO — SIGNIFICANT CHANGE UP (ref 0.85–1.18)
LYMPHOCYTES # BLD AUTO: 0.9 K/UL — LOW (ref 1–3.3)
LYMPHOCYTES # BLD AUTO: 10.1 % — LOW (ref 13–44)
MAGNESIUM SERPL-MCNC: 1.6 MG/DL — SIGNIFICANT CHANGE UP (ref 1.6–2.6)
MCHC RBC-ENTMCNC: 25.8 PG — LOW (ref 27–34)
MCHC RBC-ENTMCNC: 30.8 GM/DL — LOW (ref 32–36)
MCV RBC AUTO: 83.8 FL — SIGNIFICANT CHANGE UP (ref 80–100)
MONOCYTES # BLD AUTO: 0.7 K/UL — SIGNIFICANT CHANGE UP (ref 0–0.9)
MONOCYTES NFR BLD AUTO: 7.8 % — SIGNIFICANT CHANGE UP (ref 2–14)
NEUTROPHILS # BLD AUTO: 6.81 K/UL — SIGNIFICANT CHANGE UP (ref 1.8–7.4)
NEUTROPHILS NFR BLD AUTO: 76.2 % — SIGNIFICANT CHANGE UP (ref 43–77)
NRBC # BLD: 0 /100 WBCS — SIGNIFICANT CHANGE UP (ref 0–0)
NRBC # FLD: 0 K/UL — SIGNIFICANT CHANGE UP (ref 0–0)
PF4 HEPARIN CMPLX AB SER-ACNC: NEGATIVE — SIGNIFICANT CHANGE UP
PHOSPHATE SERPL-MCNC: 3.1 MG/DL — SIGNIFICANT CHANGE UP (ref 2.5–4.5)
PLATELET # BLD AUTO: 64 K/UL — LOW (ref 150–400)
POTASSIUM SERPL-MCNC: 4 MMOL/L — SIGNIFICANT CHANGE UP (ref 3.5–5.3)
POTASSIUM SERPL-SCNC: 4 MMOL/L — SIGNIFICANT CHANGE UP (ref 3.5–5.3)
PROT SERPL-MCNC: 6.7 G/DL — SIGNIFICANT CHANGE UP (ref 6–8.3)
PROTHROM AB SERPL-ACNC: 11.1 SEC — SIGNIFICANT CHANGE UP (ref 9.5–13)
RBC # BLD: 3.14 M/UL — LOW (ref 3.8–5.2)
RBC # FLD: 17.3 % — HIGH (ref 10.3–14.5)
SODIUM SERPL-SCNC: 134 MMOL/L — LOW (ref 135–145)
SPECIMEN SOURCE: SIGNIFICANT CHANGE UP
SPECIMEN SOURCE: SIGNIFICANT CHANGE UP
WBC # BLD: 8.94 K/UL — SIGNIFICANT CHANGE UP (ref 3.8–10.5)
WBC # FLD AUTO: 8.94 K/UL — SIGNIFICANT CHANGE UP (ref 3.8–10.5)

## 2023-10-02 PROCEDURE — 93308 TTE F-UP OR LMTD: CPT

## 2023-10-02 PROCEDURE — 99232 SBSQ HOSP IP/OBS MODERATE 35: CPT

## 2023-10-02 PROCEDURE — 93970 EXTREMITY STUDY: CPT | Mod: 26,59

## 2023-10-02 RX ORDER — NIFEDIPINE 30 MG
30 TABLET, EXTENDED RELEASE 24 HR ORAL ONCE
Refills: 0 | Status: DISCONTINUED | OUTPATIENT
Start: 2023-10-02 | End: 2023-10-02

## 2023-10-02 RX ORDER — HYDRALAZINE HCL 50 MG
100 TABLET ORAL THREE TIMES A DAY
Refills: 0 | Status: DISCONTINUED | OUTPATIENT
Start: 2023-10-02 | End: 2023-10-02

## 2023-10-02 RX ORDER — NIFEDIPINE 30 MG
60 TABLET, EXTENDED RELEASE 24 HR ORAL DAILY
Refills: 0 | Status: DISCONTINUED | OUTPATIENT
Start: 2023-10-03 | End: 2023-10-06

## 2023-10-02 RX ADMIN — Medication 30 MILLIGRAM(S): at 11:03

## 2023-10-02 RX ADMIN — PANTOPRAZOLE SODIUM 40 MILLIGRAM(S): 20 TABLET, DELAYED RELEASE ORAL at 06:03

## 2023-10-02 RX ADMIN — CHLORHEXIDINE GLUCONATE 1 APPLICATION(S): 213 SOLUTION TOPICAL at 06:03

## 2023-10-02 RX ADMIN — Medication 100 MILLIGRAM(S): at 17:48

## 2023-10-02 RX ADMIN — Medication 4: at 17:12

## 2023-10-02 RX ADMIN — Medication 125 MICROGRAM(S): at 04:51

## 2023-10-02 NOTE — CONSULT NOTE ADULT - CONSULT REQUESTED BY NAME
Not able to speak with pt in regards of  below message. VM was left for pt to call our office back.  
Aida Joshi
Dr. Humel
MICU
Primary team
Dr. Pearce
Leah Cadena
Primary team

## 2023-10-02 NOTE — PROGRESS NOTE ADULT - PROBLEM SELECTOR PLAN 4
Noted. Suspect i/s/o abx +/- infection. Diff incl HIT  --d/c abx  --Heparin discontinued  --Obtain BETZY, Hep Ab  --Obatin Blue top in the AM  --Obtain Serum fibrinogen /coags  --Heme consulted given intermediate probability of HIT Noted. Suspect i/s/o abx +/- infection. Diff incl HIT  --d/c abx  --Heparin discontinued  --Obtain BETZY, Hep Ab  --Blue top platelet 52  --Obtain Serum fibrinogen /coags  --Heme consulted given intermediate probability of HIT

## 2023-10-02 NOTE — PROGRESS NOTE ADULT - PROBLEM SELECTOR PLAN 1
--Likely multifactorial i/s/o acute on chronic HF +/- progressive renal failure +/- possible PNA. Lower suspicion for PE at this time  --TTE 08/23 w/ EF 55%, Mildly enlarged RV and Large pericardial effusion  --CT C/A/P w/ B/L GGO and mod B/L pleural effusions  --Pt oliguric. W/o sig improvement w/ Bumex gtt  --C/w HD for volume removal  --Dialysis dependent- will plan for tunneled cath placement per renal recs. IR Consulted, recs reconsult next week once blood cx negative. Blood cx negative 09/24 (1 bottle),  repeat blood cx also NGTD  --Appreciate HF/Renal input  --Rest of mgt as below --Likely multifactorial i/s/o acute on chronic HF +/- progressive renal failure +/- possible PNA. Lower suspicion for PE at this time  --TTE 08/23 w/ EF 55%, Mildly enlarged RV and Large pericardial effusion  --CT C/A/P w/ B/L GGO and mod B/L pleural effusions  --Pt oliguric. W/o sig improvement w/ Bumex gtt  --C/w HD for volume removal  --Dialysis dependent- will plan for tunneled cath placement per renal recs. IR Consulted, had recs reconsult next week once blood cx negative. Blood cx negative 09/24 (1 bottle),  repeat blood cx also NGTD  --Appreciate HF/Renal input  --Rest of mgt as below

## 2023-10-02 NOTE — CHART NOTE - NSCHARTNOTEFT_GEN_A_CORE
PRE-INTERVENTIONAL RADIOLOGY PROCEDURE NOTE    Patient Age: 58    Patient Gender: Female    Procedure: Permacath placement for HD    Diagnosis/Indication: ISAMAR on CKD, needs long term HD    Interventional Radiology Attending Physician: MD Beltran    Ordering Attending Physician: MD Shannan Kaur    Pertinent Medical History: 59 y/o Female, with a PmHx of HTN, HLD, DM2, CKD (stage 3-4), Hypothyroidism (c/b myxedema coma in past), severe pulmonary hypertension, mod-severe TR, asthma, HFpEF, presents for progressive dyspnea at rest with left sided chest tightness, found to be in ADHF, admitted to MICU for urgent HD due to acidosis and volume OL, anuric on bumex drip.    Pertinent labs:                      8.1    8.94  )-----------( 64       ( 02 Oct 2023 06:27 )             26.3       10-02    134<L>  |  98  |  21  ----------------------------<  107<H>  4.0   |  24  |  2.51<H>    Ca    8.6      02 Oct 2023 06:27  Phos  3.1     10-02  Mg     1.60     10-02    TPro  6.7  /  Alb  3.4  /  TBili  0.6  /  DBili  x   /  AST  52<H>  /  ALT  138<H>  /  AlkPhos  609<H>  10-02      PT/INR - ( 02 Oct 2023 06:27 )   PT: 11.1 sec;   INR: 0.99 ratio         PTT - ( 02 Oct 2023 06:27 )  PTT:37.9 sec      Patient and Family Aware ? Yes

## 2023-10-02 NOTE — CHART NOTE - NSCHARTNOTEFT_GEN_A_CORE
Discussed with heme, likely low suspicion of HIT pending serotonin releasing assay. Recommendation is to start dvt ppx with SCD's and hold off on heparin subq.    Aida Joshi NP  [id number]

## 2023-10-02 NOTE — CONSULT NOTE ADULT - REASON FOR ADMISSION
progressive dyspnea

## 2023-10-02 NOTE — PROGRESS NOTE ADULT - SUBJECTIVE AND OBJECTIVE BOX
Patient is a 58y old  Female who presents with a chief complaint of progressive dyspnea (01 Oct 2023 13:16)      SUBJECTIVE / OVERNIGHT EVENTS:    MEDICATIONS  (STANDING):  chlorhexidine 2% Cloths 1 Application(s) Topical <User Schedule>  dextrose 5%. 1000 milliLiter(s) (100 mL/Hr) IV Continuous <Continuous>  dextrose 5%. 1000 milliLiter(s) (50 mL/Hr) IV Continuous <Continuous>  dextrose 50% Injectable 25 Gram(s) IV Push once  dextrose 50% Injectable 12.5 Gram(s) IV Push once  dextrose 50% Injectable 25 Gram(s) IV Push once  glucagon  Injectable 1 milliGRAM(s) IntraMuscular once  insulin lispro (ADMELOG) corrective regimen sliding scale   SubCutaneous three times a day before meals  insulin lispro (ADMELOG) corrective regimen sliding scale   SubCutaneous at bedtime  levothyroxine 125 MICROGram(s) Oral daily  NIFEdipine XL 30 milliGRAM(s) Oral daily  pantoprazole    Tablet 40 milliGRAM(s) Oral before breakfast  polyethylene glycol 3350 17 Gram(s) Oral daily  senna 2 Tablet(s) Oral at bedtime    MEDICATIONS  (PRN):  dextrose Oral Gel 15 Gram(s) Oral once PRN Blood Glucose LESS THAN 70 milliGRAM(s)/deciliter  melatonin 3 milliGRAM(s) Oral at bedtime PRN Insomnia      Vital Signs Last 24 Hrs  T(C): 36.3 (02 Oct 2023 04:45), Max: 36.9 (01 Oct 2023 19:33)  T(F): 97.4 (02 Oct 2023 04:45), Max: 98.4 (01 Oct 2023 19:33)  HR: 73 (02 Oct 2023 04:45) (71 - 75)  BP: 145/75 (02 Oct 2023 04:45) (145/75 - 158/63)  BP(mean): --  RR: 18 (02 Oct 2023 04:45) (18 - 18)  SpO2: 100% (02 Oct 2023 04:45) (97% - 100%)    Parameters below as of 02 Oct 2023 04:45  Patient On (Oxygen Delivery Method): room air      CAPILLARY BLOOD GLUCOSE      POCT Blood Glucose.: 143 mg/dL (02 Oct 2023 08:15)  POCT Blood Glucose.: 123 mg/dL (01 Oct 2023 22:05)  POCT Blood Glucose.: 211 mg/dL (01 Oct 2023 16:48)  POCT Blood Glucose.: 152 mg/dL (01 Oct 2023 12:02)    I&O's Summary    01 Oct 2023 07:01  -  02 Oct 2023 07:00  --------------------------------------------------------  IN: 560 mL / OUT: 450 mL / NET: 110 mL        PHYSICAL EXAM:  GENERAL: NAD, well-developed  HEAD:  Atraumatic, Normocephalic  EYES: EOMI, PERRLA, conjunctiva and sclera clear  NECK: Supple, No JVD  CHEST/LUNG: Clear to auscultation bilaterally; No wheeze  HEART: Regular rate and rhythm; No murmurs, rubs, or gallops  ABDOMEN: Soft, Nontender, Nondistended; Bowel sounds present  EXTREMITIES:  2+ Peripheral Pulses, No clubbing, cyanosis, or edema  PSYCH: AAOx3  NEUROLOGY: non-focal  SKIN: No rashes or lesions    LABS:                        8.1    8.94  )-----------( 64       ( 02 Oct 2023 06:27 )             26.3     10-02    134<L>  |  98  |  21  ----------------------------<  107<H>  4.0   |  24  |  2.51<H>    Ca    8.6      02 Oct 2023 06:27  Phos  3.1     10-02  Mg     1.60     10-02    TPro  6.7  /  Alb  3.4  /  TBili  0.6  /  DBili  x   /  AST  52<H>  /  ALT  138<H>  /  AlkPhos  609<H>  10-02    PT/INR - ( 02 Oct 2023 06:27 )   PT: 11.1 sec;   INR: 0.99 ratio         PTT - ( 02 Oct 2023 06:27 )  PTT:37.9 sec      Urinalysis Basic - ( 02 Oct 2023 06:27 )    Color: x / Appearance: x / SG: x / pH: x  Gluc: 107 mg/dL / Ketone: x  / Bili: x / Urobili: x   Blood: x / Protein: x / Nitrite: x   Leuk Esterase: x / RBC: x / WBC x   Sq Epi: x / Non Sq Epi: x / Bacteria: x        RADIOLOGY & ADDITIONAL TESTS:    Imaging Personally Reviewed:    Consultant(s) Notes Reviewed:      Care Discussed with Consultants/Other Providers:   Patient is a 58y old  Female who presents with a chief complaint of progressive dyspnea (01 Oct 2023 13:16)      SUBJECTIVE / OVERNIGHT EVENTS: patient seen and examined by bedside, pt awake, alert feeling better, denies headache, dizziness, SOB, CP, Palpitations , N/V/D, abdominal pain  tele :NSR in 70s     MEDICATIONS  (STANDING):  chlorhexidine 2% Cloths 1 Application(s) Topical <User Schedule>  dextrose 5%. 1000 milliLiter(s) (100 mL/Hr) IV Continuous <Continuous>  dextrose 5%. 1000 milliLiter(s) (50 mL/Hr) IV Continuous <Continuous>  dextrose 50% Injectable 25 Gram(s) IV Push once  dextrose 50% Injectable 12.5 Gram(s) IV Push once  dextrose 50% Injectable 25 Gram(s) IV Push once  glucagon  Injectable 1 milliGRAM(s) IntraMuscular once  insulin lispro (ADMELOG) corrective regimen sliding scale   SubCutaneous three times a day before meals  insulin lispro (ADMELOG) corrective regimen sliding scale   SubCutaneous at bedtime  levothyroxine 125 MICROGram(s) Oral daily  NIFEdipine XL 30 milliGRAM(s) Oral daily  pantoprazole    Tablet 40 milliGRAM(s) Oral before breakfast  polyethylene glycol 3350 17 Gram(s) Oral daily  senna 2 Tablet(s) Oral at bedtime    MEDICATIONS  (PRN):  dextrose Oral Gel 15 Gram(s) Oral once PRN Blood Glucose LESS THAN 70 milliGRAM(s)/deciliter  melatonin 3 milliGRAM(s) Oral at bedtime PRN Insomnia      Vital Signs Last 24 Hrs  T(C): 36.3 (02 Oct 2023 04:45), Max: 36.9 (01 Oct 2023 19:33)  T(F): 97.4 (02 Oct 2023 04:45), Max: 98.4 (01 Oct 2023 19:33)  HR: 73 (02 Oct 2023 04:45) (71 - 75)  BP: 145/75 (02 Oct 2023 04:45) (145/75 - 158/63)  BP(mean): --  RR: 18 (02 Oct 2023 04:45) (18 - 18)  SpO2: 100% (02 Oct 2023 04:45) (97% - 100%)    Parameters below as of 02 Oct 2023 04:45  Patient On (Oxygen Delivery Method): room air      CAPILLARY BLOOD GLUCOSE      POCT Blood Glucose.: 143 mg/dL (02 Oct 2023 08:15)  POCT Blood Glucose.: 123 mg/dL (01 Oct 2023 22:05)  POCT Blood Glucose.: 211 mg/dL (01 Oct 2023 16:48)  POCT Blood Glucose.: 152 mg/dL (01 Oct 2023 12:02)    I&O's Summary    01 Oct 2023 07:01  -  02 Oct 2023 07:00  --------------------------------------------------------  IN: 560 mL / OUT: 450 mL / NET: 110 mL        PHYSICAL EXAM:  GENERAL: NAD  HEENT:  Atraumatic, Normocephalic, EOMI, conjunctiva and sclera clear  NECK: Supple, R IJ Non tunneled cath (dressing c/d/i)  CHEST/LUNG: Clear to auscultation bilaterally; No wheeze, crackles or rhonchi   HEART: Regular rate and rhythm; Normal S1 S2, No murmurs, rubs, or gallops. s/p pericardial drain removal , site c/d/i w/o bleed  ABDOMEN: Soft, Nontender, Nondistended; Bowel sounds present  EXTREMITIES:  2+ Peripheral Pulses, No edema  PSYCH: AAOx3  NEUROLOGY: non-focal  SKIN: Warm and dry      LABS:                        8.1    8.94  )-----------( 64       ( 02 Oct 2023 06:27 )             26.3     10-02    134<L>  |  98  |  21  ----------------------------<  107<H>  4.0   |  24  |  2.51<H>    Ca    8.6      02 Oct 2023 06:27  Phos  3.1     10-02  Mg     1.60     10-02    TPro  6.7  /  Alb  3.4  /  TBili  0.6  /  DBili  x   /  AST  52<H>  /  ALT  138<H>  /  AlkPhos  609<H>  10-02    PT/INR - ( 02 Oct 2023 06:27 )   PT: 11.1 sec;   INR: 0.99 ratio         PTT - ( 02 Oct 2023 06:27 )  PTT:37.9 sec      Urinalysis Basic - ( 02 Oct 2023 06:27 )    Color: x / Appearance: x / SG: x / pH: x  Gluc: 107 mg/dL / Ketone: x  / Bili: x / Urobili: x   Blood: x / Protein: x / Nitrite: x   Leuk Esterase: x / RBC: x / WBC x   Sq Epi: x / Non Sq Epi: x / Bacteria: x        RADIOLOGY & ADDITIONAL TESTS:    Imaging Personally Reviewed:    Consultant(s) Notes Reviewed:      Care Discussed with Consultants/Other Providers:

## 2023-10-02 NOTE — PROGRESS NOTE ADULT - PROBLEM SELECTOR PLAN 6
--Likely 2/2 anemia of chronic disease + Acute blood loss anemia i/s/o recent bleed from pericardial drain site  --Anemia w/o c/w ACD  --Monitor Hgb  --Transfuse Hgb < 7 --Likely 2/2 anemia of chronic disease + Acute blood loss anemia i/s/o recent bleed from pericardial drain site  --Anemia w/o c/w ACD  --Monitor Hgb  --Transfuse Hgb < 7, H/H currently stable

## 2023-10-02 NOTE — CONSULT NOTE ADULT - CONSULT REASON
ADHF
ISAMAR on CKD with hyponatremia
R/o Tamponade
Tunneled cath
Hypoxia
Thrombocytopenia
tunneled HD catheter

## 2023-10-02 NOTE — PROGRESS NOTE ADULT - PROBLEM SELECTOR PLAN 12
DVT: HSQ  DIET: cc  DISPO: Pending hospital course, PT consulted recs rehab DVT: HSQ  DIET: cc  DISPO: Pending hospital course,   -PT consulted recs rehab  plan of care d/w pt and ACP

## 2023-10-02 NOTE — CONSULT NOTE ADULT - CONSULT REQUESTED DATE/TIME
01-Oct-2023 09:49
24-Sep-2023 10:53
28-Sep-2023 18:02
02-Oct-2023 15:41
22-Sep-2023 11:41
21-Sep-2023
21-Sep-2023 11:15

## 2023-10-02 NOTE — CONSULT NOTE ADULT - ASSESSMENT
Interventional Radiology    Evaluate for Procedure: tunneled HD catheter    HPI: 58y Female with CKD, severe pHTN, HFpEF presented w/ SOB , admitted for AHRF likely 2/2  volume overload admitted to MICU for urgent HD w/ course c/b obstructive shock in the setting of pericardial tamponade now s/p pericardial drain placement. Patient with positive blood cultures. BCxs negative 9/24 and 9/30. IR consulted for nontunneled HD catheter.      Allergies: No Known Allergies    Medications (Abx/Cardiac/Anticoagulation/Blood Products)    NIFEdipine XL: 30 milliGRAM(s) Oral (10-02 @ 11:03)  piperacillin/tazobactam IVPB..: 25 mL/Hr IV Intermittent (10-01 @ 05:57)    Data:    T(C): 36.3  HR: 74  BP: 173/74  RR: 18  SpO2: 94%    -WBC 8.94 / HgB 8.1 / Hct 26.3 / Plt 64  -Na 134 / Cl 98 / BUN 21 / Glucose 107  -K 4.0 / CO2 24 / Cr 2.51  - / Alk Phos 609 / T.Bili 0.6  -INR 0.99 / PTT 37.9      Radiology:     Assessment/Plan:  58y Female with CKD, severe pHTN, HFpEF, AHRF likely 2/2  volume overload admitted to MICU for urgent HD w/ course c/b obstructive shock in the setting of pericardial tamponade now s/p pericardial drain placement. Patient with positive blood cultures. BCxs negative 9/24 and 9/30. IR consulted for nontunneled HD catheter.    -- IR will plan to perform tunneled HD catheter placement on Wed 10/4  -- NPO at midnight tomorrow 10/3  -- Routine AM labs plus coags (PT/PTT/INR) on 10/4  -- hold a.m. anticoagulation on 10/4  -- please place IR procedure request order under Dr. Beltran  -- Please write IR pre procedure note      - Non-emergent consults: Place IR consult order in [place]  - Emergent issues (pager): [facility] [phone]; [facility] [phone]; [id number]  - Scheduling questions: [facility] [phone]; [facility] [phone]  - Clinic/outpatient booking: [facility] [phone]; [facility] [phone]

## 2023-10-02 NOTE — PROGRESS NOTE ADULT - PROBLEM SELECTOR PLAN 5
--Pt w/ new diarrhea suspect 2/2 antibiotics  --Now improving  --s/p Abx  --F/u GI PCR/ O/P  --F/u stool cx   --Monitor stool count --Pt w/ new diarrhea suspect 2/2 antibiotics  --Now improving  --s/p Abx  --GI PCR/ O/P negative,  --stool cx with yeast like cells   --Monitor stool count

## 2023-10-03 DIAGNOSIS — N18.6 END STAGE RENAL DISEASE: ICD-10-CM

## 2023-10-03 LAB
ALBUMIN SERPL ELPH-MCNC: 3.3 G/DL — SIGNIFICANT CHANGE UP (ref 3.3–5)
ALP SERPL-CCNC: 547 U/L — HIGH (ref 40–120)
ALT FLD-CCNC: 105 U/L — HIGH (ref 4–33)
ANION GAP SERPL CALC-SCNC: 14 MMOL/L — SIGNIFICANT CHANGE UP (ref 7–14)
AST SERPL-CCNC: 35 U/L — HIGH (ref 4–32)
BASOPHILS # BLD AUTO: 0.02 K/UL — SIGNIFICANT CHANGE UP (ref 0–0.2)
BASOPHILS NFR BLD AUTO: 0.2 % — SIGNIFICANT CHANGE UP (ref 0–2)
BILIRUB SERPL-MCNC: 0.5 MG/DL — SIGNIFICANT CHANGE UP (ref 0.2–1.2)
BUN SERPL-MCNC: 30 MG/DL — HIGH (ref 7–23)
CALCIUM SERPL-MCNC: 8.6 MG/DL — SIGNIFICANT CHANGE UP (ref 8.4–10.5)
CHLORIDE SERPL-SCNC: 99 MMOL/L — SIGNIFICANT CHANGE UP (ref 98–107)
CO2 SERPL-SCNC: 22 MMOL/L — SIGNIFICANT CHANGE UP (ref 22–31)
CREAT SERPL-MCNC: 3.88 MG/DL — HIGH (ref 0.5–1.3)
CULTURE RESULTS: SIGNIFICANT CHANGE UP
CULTURE RESULTS: SIGNIFICANT CHANGE UP
EGFR: 13 ML/MIN/1.73M2 — LOW
EOSINOPHIL # BLD AUTO: 0.24 K/UL — SIGNIFICANT CHANGE UP (ref 0–0.5)
EOSINOPHIL NFR BLD AUTO: 2.9 % — SIGNIFICANT CHANGE UP (ref 0–6)
GLUCOSE BLDC GLUCOMTR-MCNC: 163 MG/DL — HIGH (ref 70–99)
GLUCOSE BLDC GLUCOMTR-MCNC: 210 MG/DL — HIGH (ref 70–99)
GLUCOSE BLDC GLUCOMTR-MCNC: 245 MG/DL — HIGH (ref 70–99)
GLUCOSE BLDC GLUCOMTR-MCNC: 246 MG/DL — HIGH (ref 70–99)
GLUCOSE BLDC GLUCOMTR-MCNC: 301 MG/DL — HIGH (ref 70–99)
GLUCOSE SERPL-MCNC: 222 MG/DL — HIGH (ref 70–99)
HCT VFR BLD CALC: 26.3 % — LOW (ref 34.5–45)
HGB BLD-MCNC: 8.6 G/DL — LOW (ref 11.5–15.5)
IANC: 6.61 K/UL — SIGNIFICANT CHANGE UP (ref 1.8–7.4)
IMM GRANULOCYTES NFR BLD AUTO: 2.2 % — HIGH (ref 0–0.9)
LYMPHOCYTES # BLD AUTO: 0.62 K/UL — LOW (ref 1–3.3)
LYMPHOCYTES # BLD AUTO: 7.5 % — LOW (ref 13–44)
MAGNESIUM SERPL-MCNC: 1.4 MG/DL — LOW (ref 1.6–2.6)
MCHC RBC-ENTMCNC: 26.8 PG — LOW (ref 27–34)
MCHC RBC-ENTMCNC: 32.7 GM/DL — SIGNIFICANT CHANGE UP (ref 32–36)
MCV RBC AUTO: 81.9 FL — SIGNIFICANT CHANGE UP (ref 80–100)
MONOCYTES # BLD AUTO: 0.56 K/UL — SIGNIFICANT CHANGE UP (ref 0–0.9)
MONOCYTES NFR BLD AUTO: 6.8 % — SIGNIFICANT CHANGE UP (ref 2–14)
NEUTROPHILS # BLD AUTO: 6.61 K/UL — SIGNIFICANT CHANGE UP (ref 1.8–7.4)
NEUTROPHILS NFR BLD AUTO: 80.4 % — HIGH (ref 43–77)
NRBC # BLD: 0 /100 WBCS — SIGNIFICANT CHANGE UP (ref 0–0)
NRBC # FLD: 0 K/UL — SIGNIFICANT CHANGE UP (ref 0–0)
PHOSPHATE SERPL-MCNC: 3.1 MG/DL — SIGNIFICANT CHANGE UP (ref 2.5–4.5)
PLATELET # BLD AUTO: 77 K/UL — LOW (ref 150–400)
POTASSIUM SERPL-MCNC: 4.3 MMOL/L — SIGNIFICANT CHANGE UP (ref 3.5–5.3)
POTASSIUM SERPL-SCNC: 4.3 MMOL/L — SIGNIFICANT CHANGE UP (ref 3.5–5.3)
PROT SERPL-MCNC: 6.4 G/DL — SIGNIFICANT CHANGE UP (ref 6–8.3)
RBC # BLD: 3.21 M/UL — LOW (ref 3.8–5.2)
RBC # FLD: 17.2 % — HIGH (ref 10.3–14.5)
SODIUM SERPL-SCNC: 135 MMOL/L — SIGNIFICANT CHANGE UP (ref 135–145)
SPECIMEN SOURCE: SIGNIFICANT CHANGE UP
SPECIMEN SOURCE: SIGNIFICANT CHANGE UP
WBC # BLD: 8.23 K/UL — SIGNIFICANT CHANGE UP (ref 3.8–10.5)
WBC # FLD AUTO: 8.23 K/UL — SIGNIFICANT CHANGE UP (ref 3.8–10.5)

## 2023-10-03 PROCEDURE — 99233 SBSQ HOSP IP/OBS HIGH 50: CPT

## 2023-10-03 PROCEDURE — 99232 SBSQ HOSP IP/OBS MODERATE 35: CPT | Mod: GC

## 2023-10-03 RX ORDER — SODIUM CHLORIDE 9 MG/ML
100 INJECTION INTRAMUSCULAR; INTRAVENOUS; SUBCUTANEOUS
Refills: 0 | Status: DISCONTINUED | OUTPATIENT
Start: 2023-10-03 | End: 2023-10-06

## 2023-10-03 RX ORDER — CHLORHEXIDINE GLUCONATE 213 G/1000ML
1 SOLUTION TOPICAL DAILY
Refills: 0 | Status: DISCONTINUED | OUTPATIENT
Start: 2023-10-03 | End: 2023-10-06

## 2023-10-03 RX ADMIN — Medication 8: at 17:48

## 2023-10-03 RX ADMIN — PANTOPRAZOLE SODIUM 40 MILLIGRAM(S): 20 TABLET, DELAYED RELEASE ORAL at 06:12

## 2023-10-03 RX ADMIN — Medication 125 MICROGRAM(S): at 04:45

## 2023-10-03 RX ADMIN — Medication 60 MILLIGRAM(S): at 06:12

## 2023-10-03 RX ADMIN — Medication 60 MILLIGRAM(S): at 17:51

## 2023-10-03 RX ADMIN — CHLORHEXIDINE GLUCONATE 1 APPLICATION(S): 213 SOLUTION TOPICAL at 06:15

## 2023-10-03 RX ADMIN — Medication 4: at 08:38

## 2023-10-03 NOTE — PROGRESS NOTE ADULT - PROBLEM SELECTOR PLAN 4
Noted. Suspect i/s/o abx +/- infection. Diff incl HIT  --d/c abx  --Heparin discontinued  --Obtain BETZY, Hep Ab  --Blue top platelet 52  --Obtain Serum fibrinogen /coags  --Heme consulted given intermediate probability of HIT

## 2023-10-03 NOTE — PROGRESS NOTE ADULT - SUBJECTIVE AND OBJECTIVE BOX
[facility] Division of Kidney Diseases & Hypertension  FOLLOW UP NOTE  [phone]--------------------------------------------------------------------------------    Chief Complaint: ISAMAR on CKD/Ongoing hemodialysis requirement    24 hour events/subjective: Pt. with oliguric ISAMAR on CKD, initiated on HD on 9/22/23. Pt. seen and examined at bedside corbin. Reports doing well and no acute complaints. No fever, CP, SOB, HA or dizziness. Plan for HD today.    PAST HISTORY  --------------------------------------------------------------------------------  No significant changes to PMH, PSH, FHx, SHx, unless otherwise noted    ALLERGIES & MEDICATIONS  --------------------------------------------------------------------------------  Allergies  No Known Allergies    Intolerances    Standing Inpatient Medications  chlorhexidine 2% Cloths 1 Application(s) Topical <User Schedule>  chlorhexidine 2% Cloths 1 Application(s) Topical daily  dextrose 5%. 1000 milliLiter(s) IV Continuous <Continuous>  dextrose 5%. 1000 milliLiter(s) IV Continuous <Continuous>  dextrose 50% Injectable 25 Gram(s) IV Push once  dextrose 50% Injectable 12.5 Gram(s) IV Push once  dextrose 50% Injectable 25 Gram(s) IV Push once  glucagon  Injectable 1 milliGRAM(s) IntraMuscular once  insulin lispro (ADMELOG) corrective regimen sliding scale   SubCutaneous at bedtime  insulin lispro (ADMELOG) corrective regimen sliding scale   SubCutaneous three times a day before meals  levothyroxine 125 MICROGram(s) Oral daily  NIFEdipine XL 60 milliGRAM(s) Oral daily  pantoprazole    Tablet 40 milliGRAM(s) Oral before breakfast  polyethylene glycol 3350 17 Gram(s) Oral daily  senna 2 Tablet(s) Oral at bedtime  torsemide 60 milliGRAM(s) Oral daily    PRN Inpatient Medications  dextrose Oral Gel 15 Gram(s) Oral once PRN  melatonin 3 milliGRAM(s) Oral at bedtime PRN  sodium chloride 0.9% Bolus. 100 milliLiter(s) IV Bolus every 5 minutes PRN    REVIEW OF SYSTEMS  --------------------------------------------------------------------------------  As per HPI    VITALS/PHYSICAL EXAM  --------------------------------------------------------------------------------  T(C): 35.7 (10-03-23 @ 11:45), Max: 36.3 (10-02-23 @ 16:19)  HR: 76 (10-03-23 @ 11:45) (74 - 85)  BP: 145/71 (10-03-23 @ 11:45) (128/56 - 185/83)  RR: 16 (10-03-23 @ 11:45) (16 - 18)  SpO2: 100% (10-03-23 @ 11:45) (94% - 100%)  Wt(kg): --    10-02-23 @ 07:01  -  10-03-23 @ 07:00  --------------------------------------------------------  IN: 560 mL / OUT: 600 mL / NET: -40 mL    Physical Exam:  Gen: resting, NAD   HEENT: MMM  Pulm: CTA B/L  CV: S1S2+  Abd: Soft, +BS   Ext: b/l LE edema   Neuro: Awake and alert  Skin: Warm and dry  Vascular access: Right IJ non-tunneled HD catheter being used for HD     LABS/STUDIES  --------------------------------------------------------------------------------              8.6    8.23  >-----------<  77       [10-03-23 @ 06:23]              26.3     135  |  99  |  30  ----------------------------<  222      [10-03-23 @ 06:23]  4.3   |  22  |  3.88        Ca     8.6     [10-03-23 @ 06:23]      Mg     1.40     [10-03-23 @ 06:23]      Phos  3.1     [10-03-23 @ 06:23]    TPro  6.4  /  Alb  3.3  /  TBili  0.5  /  DBili  x   /  AST  35  /  ALT  105  /  AlkPhos  547  [10-03-23 @ 06:23]    PT/INR: PT 11.1 , INR 0.99       [10-02-23 @ 06:27]  PTT: 37.9       [10-02-23 @ 06:27]    Creatinine Trend:  SCr 3.88 [10-03 @ 06:23]  SCr 2.51 [10-02 @ 06:27]  SCr 2.20 [10-01 @ 06:38]  SCr 3.05 [09-30 @ 07:25]  SCr 2.23 [09-29 @ 05:39]    Urinalysis - [10-03-23 @ 06:23]      Color  / Appearance  / SG  / pH       Gluc 222 / Ketone   / Bili  / Urobili        Blood  / Protein  / Leuk Est  / Nitrite       RBC  / WBC  / Hyaline  / Gran  / Sq Epi  / Non Sq Epi  / Bacteria     HBsAg Nonreact      [10-02-23 @ 06:27]  HCV 0.08, Nonreact      [10-02-23 @ 06:27]  HIV Nonreact      [10-02-23 @ 06:27] [facility] Division of Kidney Diseases & Hypertension  FOLLOW UP NOTE  [phone]--------------------------------------------------------------------------------    Chief Complaint: ISAMAR on CKD/Ongoing hemodialysis requirement    24 hour events/subjective: Pt. with oliguric ISAMAR on CKD, initiated on HD on 9/22/23. Pt. seen and examined at bedside today. Reports doing well and no acute complaints. No fever, CP, SOB, HA or dizziness. Plan for HD today.    PAST HISTORY  --------------------------------------------------------------------------------  No significant changes to PMH, PSH, FHx, SHx, unless otherwise noted    ALLERGIES & MEDICATIONS  --------------------------------------------------------------------------------  Allergies  No Known Allergies    Intolerances    Standing Inpatient Medications  chlorhexidine 2% Cloths 1 Application(s) Topical <User Schedule>  chlorhexidine 2% Cloths 1 Application(s) Topical daily  dextrose 5%. 1000 milliLiter(s) IV Continuous <Continuous>  dextrose 5%. 1000 milliLiter(s) IV Continuous <Continuous>  dextrose 50% Injectable 25 Gram(s) IV Push once  dextrose 50% Injectable 12.5 Gram(s) IV Push once  dextrose 50% Injectable 25 Gram(s) IV Push once  glucagon  Injectable 1 milliGRAM(s) IntraMuscular once  insulin lispro (ADMELOG) corrective regimen sliding scale   SubCutaneous at bedtime  insulin lispro (ADMELOG) corrective regimen sliding scale   SubCutaneous three times a day before meals  levothyroxine 125 MICROGram(s) Oral daily  NIFEdipine XL 60 milliGRAM(s) Oral daily  pantoprazole    Tablet 40 milliGRAM(s) Oral before breakfast  polyethylene glycol 3350 17 Gram(s) Oral daily  senna 2 Tablet(s) Oral at bedtime  torsemide 60 milliGRAM(s) Oral daily    PRN Inpatient Medications  dextrose Oral Gel 15 Gram(s) Oral once PRN  melatonin 3 milliGRAM(s) Oral at bedtime PRN  sodium chloride 0.9% Bolus. 100 milliLiter(s) IV Bolus every 5 minutes PRN    REVIEW OF SYSTEMS  --------------------------------------------------------------------------------  As per HPI    VITALS/PHYSICAL EXAM  --------------------------------------------------------------------------------  T(C): 35.7 (10-03-23 @ 11:45), Max: 36.3 (10-02-23 @ 16:19)  HR: 76 (10-03-23 @ 11:45) (74 - 85)  BP: 145/71 (10-03-23 @ 11:45) (128/56 - 185/83)  RR: 16 (10-03-23 @ 11:45) (16 - 18)  SpO2: 100% (10-03-23 @ 11:45) (94% - 100%)  Wt(kg): --    10-02-23 @ 07:01  -  10-03-23 @ 07:00  --------------------------------------------------------  IN: 560 mL / OUT: 600 mL / NET: -40 mL    Physical Exam:  Gen: resting, NAD   HEENT: MMM  Pulm: CTA B/L  CV: S1S2+  Abd: Soft, +BS   Ext: b/l LE edema   Neuro: Awake and alert  Skin: Warm and dry  Vascular access: Right IJ non-tunneled HD catheter being used for HD     LABS/STUDIES  --------------------------------------------------------------------------------              8.6    8.23  >-----------<  77       [10-03-23 @ 06:23]              26.3     135  |  99  |  30  ----------------------------<  222      [10-03-23 @ 06:23]  4.3   |  22  |  3.88        Ca     8.6     [10-03-23 @ 06:23]      Mg     1.40     [10-03-23 @ 06:23]      Phos  3.1     [10-03-23 @ 06:23]    TPro  6.4  /  Alb  3.3  /  TBili  0.5  /  DBili  x   /  AST  35  /  ALT  105  /  AlkPhos  547  [10-03-23 @ 06:23]    PT/INR: PT 11.1 , INR 0.99       [10-02-23 @ 06:27]  PTT: 37.9       [10-02-23 @ 06:27]    Creatinine Trend:  SCr 3.88 [10-03 @ 06:23]  SCr 2.51 [10-02 @ 06:27]  SCr 2.20 [10-01 @ 06:38]  SCr 3.05 [09-30 @ 07:25]  SCr 2.23 [09-29 @ 05:39]    Urinalysis - [10-03-23 @ 06:23]      Color  / Appearance  / SG  / pH       Gluc 222 / Ketone   / Bili  / Urobili        Blood  / Protein  / Leuk Est  / Nitrite       RBC  / WBC  / Hyaline  / Gran  / Sq Epi  / Non Sq Epi  / Bacteria     HBsAg Nonreact      [10-02-23 @ 06:27]  HCV 0.08, Nonreact      [10-02-23 @ 06:27]  HIV Nonreact      [10-02-23 @ 06:27]

## 2023-10-03 NOTE — PROGRESS NOTE ADULT - ATTENDING COMMENTS
Pt. with ISAMAR on CKD, initiated on HD during current hospital stay. Pt. remains dialysis dependent.  H/O CHF  1.  ARF on CKD--ESRD if 2 address does not improve function by decreasing venous congestion.  HD today and orders reviewed with HD RN and fellow.  Increased volume removal  2.  Volume overload--aggressive UF with goal cardiac performance and relief of pulmonary symptoms.  Continue bumex  3.  Hypertension--on CCB and optimize dry weight.  discussed with med, CHF team  Julian Welch MD  contact me on TEAMS

## 2023-10-03 NOTE — PROGRESS NOTE ADULT - NS ATTEND AMEND GEN_ALL_CORE FT
Feels well. Denies complaints. Sitting up in chair. On exam, JVP approx 10-12 with v waves, RRR, no m/r/g, dec BS b/l, nontender abdomen, b/l pedal edema. Labs reviewed. TTE reviewed.   - recommend continued fluid removal; daily HD/UF  - on procardia 60 mg daily; may require twice/day  - start torsemide 60 mg daily   - once permacath in, consider d/c planning

## 2023-10-03 NOTE — PROGRESS NOTE ADULT - SUBJECTIVE AND OBJECTIVE BOX
Medications:  chlorhexidine 2% Cloths 1 Application(s) Topical daily  chlorhexidine 2% Cloths 1 Application(s) Topical <User Schedule>  dextrose 5%. 1000 milliLiter(s) IV Continuous <Continuous>  dextrose 5%. 1000 milliLiter(s) IV Continuous <Continuous>  dextrose 50% Injectable 12.5 Gram(s) IV Push once  dextrose 50% Injectable 25 Gram(s) IV Push once  dextrose 50% Injectable 25 Gram(s) IV Push once  dextrose Oral Gel 15 Gram(s) Oral once PRN  glucagon  Injectable 1 milliGRAM(s) IntraMuscular once  insulin lispro (ADMELOG) corrective regimen sliding scale   SubCutaneous three times a day before meals  insulin lispro (ADMELOG) corrective regimen sliding scale   SubCutaneous at bedtime  levothyroxine 125 MICROGram(s) Oral daily  melatonin 3 milliGRAM(s) Oral at bedtime PRN  NIFEdipine XL 60 milliGRAM(s) Oral daily  pantoprazole    Tablet 40 milliGRAM(s) Oral before breakfast  polyethylene glycol 3350 17 Gram(s) Oral daily  senna 2 Tablet(s) Oral at bedtime  sodium chloride 0.9% Bolus. 100 milliLiter(s) IV Bolus every 5 minutes PRN  torsemide 60 milliGRAM(s) Oral daily      Vitals:  Vital Signs Last 24 Hrs  T(C): 36.3 (03 Oct 2023 08:27), Max: 36.3 (02 Oct 2023 12:00)  T(F): 97.3 (03 Oct 2023 08:27), Max: 97.4 (02 Oct 2023 12:00)  HR: 82 (03 Oct 2023 08:27) (73 - 85)  BP: 148/71 (03 Oct 2023 08:27) (128/56 - 185/83)  BP(mean): --  RR: 18 (03 Oct 2023 08:27) (18 - 18)  SpO2: 100% (03 Oct 2023 08:27) (94% - 100%)    Parameters below as of 03 Oct 2023 08:27  Patient On (Oxygen Delivery Method): room air        Daily     Daily Weight in k.1 (03 Oct 2023 05:06)    I&O's Detail    02 Oct 2023 07:01  -  03 Oct 2023 07:00  --------------------------------------------------------  IN:    Oral Fluid: 560 mL  Total IN: 560 mL    OUT:    Voided (mL): 600 mL  Total OUT: 600 mL    Total NET: -40 mL          Physical Exam:     General: No distress. Comfortable.  HEENT: EOM intact.  Neck: Neck supple. JVP not elevated. No masses  Chest: Clear to auscultation bilaterally  CV: Normal S1 and S2. No murmurs, rub, or gallops. Radial pulses normal.  Abdomen: Soft, non-distended, non-tender  Skin: No rashes or skin breakdown  Neurology: Alert and oriented times three. Sensation intact  Psych: Affect normal    Labs:                        8.6    8.23  )-----------( 77       ( 03 Oct 2023 06:23 )             26.3     10-03    135  |  99  |  30<H>  ----------------------------<  222<H>  4.3   |  22  |  3.88<H>    Ca    8.6      03 Oct 2023 06:23  Phos  3.1     10-03  Mg     1.40     10-03    TPro  6.4  /  Alb  3.3  /  TBili  0.5  /  DBili  x   /  AST  35<H>  /  ALT  105<H>  /  AlkPhos  547<H>  10-03    PT/INR - ( 02 Oct 2023 06:27 )   PT: 11.1 sec;   INR: 0.99 ratio         PTT - ( 02 Oct 2023 06:27 )  PTT:37.9 sec       Patient seen and examined. She states she is feeling great, no SOB at rest, CP, palpitations.       Medications:  chlorhexidine 2% Cloths 1 Application(s) Topical daily  chlorhexidine 2% Cloths 1 Application(s) Topical <User Schedule>  dextrose 5%. 1000 milliLiter(s) IV Continuous <Continuous>  dextrose 5%. 1000 milliLiter(s) IV Continuous <Continuous>  dextrose 50% Injectable 12.5 Gram(s) IV Push once  dextrose 50% Injectable 25 Gram(s) IV Push once  dextrose 50% Injectable 25 Gram(s) IV Push once  dextrose Oral Gel 15 Gram(s) Oral once PRN  glucagon  Injectable 1 milliGRAM(s) IntraMuscular once  insulin lispro (ADMELOG) corrective regimen sliding scale   SubCutaneous three times a day before meals  insulin lispro (ADMELOG) corrective regimen sliding scale   SubCutaneous at bedtime  levothyroxine 125 MICROGram(s) Oral daily  melatonin 3 milliGRAM(s) Oral at bedtime PRN  NIFEdipine XL 60 milliGRAM(s) Oral daily  pantoprazole    Tablet 40 milliGRAM(s) Oral before breakfast  polyethylene glycol 3350 17 Gram(s) Oral daily  senna 2 Tablet(s) Oral at bedtime  sodium chloride 0.9% Bolus. 100 milliLiter(s) IV Bolus every 5 minutes PRN  torsemide 60 milliGRAM(s) Oral daily      Vitals:  Vital Signs Last 24 Hrs  T(C): 36.3 (03 Oct 2023 08:27), Max: 36.3 (02 Oct 2023 12:00)  T(F): 97.3 (03 Oct 2023 08:27), Max: 97.4 (02 Oct 2023 12:00)  HR: 82 (03 Oct 2023 08:27) (73 - 85)  BP: 148/71 (03 Oct 2023 08:27) (128/56 - 185/83)  BP(mean): --  RR: 18 (03 Oct 2023 08:27) (18 - 18)  SpO2: 100% (03 Oct 2023 08:27) (94% - 100%)    Parameters below as of 03 Oct 2023 08:27  Patient On (Oxygen Delivery Method): room air        Daily     Daily Weight in k.1 (03 Oct 2023 05:06)    I&O's Detail    02 Oct 2023 07:01  -  03 Oct 2023 07:00  --------------------------------------------------------  IN:    Oral Fluid: 560 mL  Total IN: 560 mL    OUT:    Voided (mL): 600 mL  Total OUT: 600 mL    Total NET: -40 mL          Physical Exam:     General: No distress. Comfortable.  HEENT: EOM intact.  Neck: Neck supple. JVP modrately elevated. No masses  Chest: Clear to auscultation bilaterally  CV: Normal S1 and S2. No murmurs, rub, or gallops. Radial pulses normal. 2+ b/l LE edema, warm b/l.   Abdomen: Soft, non-distended, non-tender  Skin: No rashes or skin breakdown  Neurology: Alert and oriented times three. Sensation intact  Psych: Affect normal    Labs:                        8.6    8.23  )-----------( 77       ( 03 Oct 2023 06:23 )             26.3     10-    135  |  99  |  30<H>  ----------------------------<  222<H>  4.3   |  22  |  3.88<H>    Ca    8.6      03 Oct 2023 06:23  Phos  3.1     10-  Mg     1.40     10    TPro  6.4  /  Alb  3.3  /  TBili  0.5  /  DBili  x   /  AST  35<H>  /  ALT  105<H>  /  AlkPhos  547<H>  10-03    PT/INR - ( 02 Oct 2023 06:27 )   PT: 11.1 sec;   INR: 0.99 ratio         PTT - ( 02 Oct 2023 06:27 )  PTT:37.9 sec      < from: TTE Limited W or WO Ultrasound Enhancing Agent (23 @ 17:23) >  ________________________________________________________________________________________  FINDINGS:     Pericardium:  No pericardial effusion seen.     Pleura:  Left pleural effusion noted.    < end of copied text >    < from: TTE Limited W or WO Ultrasound Enhancing Agent (23 @ 13:39) >  CONCLUSIONS:      1. Left ventricular systolic function is normal with an ejection fraction visually estimated at 60 to 65 %.   2. Left ventricle was not well visualized.   3. Probably normal systolic function.   4. There is no echocardiographic evidence of tamponade on this study.  5. Trace pericardial effusion.    < end of copied text >      < from: TTE Limited W or WO Ultrasound Enhancing Agent (23 @ 10:14) >  CONCLUSIONS:      1. Large pericardial effusion, measuring ~ 2.2 cm posterior to the left ventricle. Moderate pericardial effusion, measuring ~ 1.1 cm anterior to the right ventricle, ~ 1.7 cm adjacent to the RV free wall, ~ 1.4 cm lateral to the left ventricle, and ~ 1.3 cm around the LV apex. The right atrium and right ventricle appear partially compressed in the subcostal view. This may be consistent with early tamponade physiology. Unable to interpret respirophasic variability in the transmitral and transtricuspid spectral Doppler signals due to the presence of frequent premature supraventricular complexes.   2. Left pleural effusion noted.   3. Compared to the transthoracic echocardiogram performed on 2023 the pericardial effusion appears slightly larger on the current study.    < end of copied text >             Patient seen and examined. She states she is feeling great, no SOB at rest, CP, palpitations.       Medications:  chlorhexidine 2% Cloths 1 Application(s) Topical daily  chlorhexidine 2% Cloths 1 Application(s) Topical <User Schedule>  dextrose 5%. 1000 milliLiter(s) IV Continuous <Continuous>  dextrose 5%. 1000 milliLiter(s) IV Continuous <Continuous>  dextrose 50% Injectable 12.5 Gram(s) IV Push once  dextrose 50% Injectable 25 Gram(s) IV Push once  dextrose 50% Injectable 25 Gram(s) IV Push once  dextrose Oral Gel 15 Gram(s) Oral once PRN  glucagon  Injectable 1 milliGRAM(s) IntraMuscular once  insulin lispro (ADMELOG) corrective regimen sliding scale   SubCutaneous three times a day before meals  insulin lispro (ADMELOG) corrective regimen sliding scale   SubCutaneous at bedtime  levothyroxine 125 MICROGram(s) Oral daily  melatonin 3 milliGRAM(s) Oral at bedtime PRN  NIFEdipine XL 60 milliGRAM(s) Oral daily  pantoprazole    Tablet 40 milliGRAM(s) Oral before breakfast  polyethylene glycol 3350 17 Gram(s) Oral daily  senna 2 Tablet(s) Oral at bedtime  sodium chloride 0.9% Bolus. 100 milliLiter(s) IV Bolus every 5 minutes PRN  torsemide 60 milliGRAM(s) Oral daily      Vitals:  Vital Signs Last 24 Hrs  T(C): 36.3 (03 Oct 2023 08:27), Max: 36.3 (02 Oct 2023 12:00)  T(F): 97.3 (03 Oct 2023 08:27), Max: 97.4 (02 Oct 2023 12:00)  HR: 82 (03 Oct 2023 08:27) (73 - 85)  BP: 148/71 (03 Oct 2023 08:27) (128/56 - 185/83)  BP(mean): --  RR: 18 (03 Oct 2023 08:27) (18 - 18)  SpO2: 100% (03 Oct 2023 08:27) (94% - 100%)    Parameters below as of 03 Oct 2023 08:27  Patient On (Oxygen Delivery Method): room air        Daily     Daily Weight in k.1 (03 Oct 2023 05:06)    I&O's Detail    02 Oct 2023 07:01  -  03 Oct 2023 07:00  --------------------------------------------------------  IN:    Oral Fluid: 560 mL  Total IN: 560 mL    OUT:    Voided (mL): 600 mL  Total OUT: 600 mL    Total NET: -40 mL          Physical Exam:     General: No distress. Comfortable.  HEENT: EOM intact.  Neck: Neck supple. JVP modrately elevated. No masses  Chest: Clear to auscultation bilaterally  CV: Normal S1 and S2. No murmurs, rub, or gallops. Radial pulses normal. 2+ b/l LE edema, warm b/l.   Abdomen: Soft, non-distended, non-tender  Skin: No rashes or skin breakdown  Neurology: Alert and oriented times three. Sensation intact  Psych: Affect normal    Labs:                        8.6    8.23  )-----------( 77       ( 03 Oct 2023 06:23 )             26.3     10-    135  |  99  |  30<H>  ----------------------------<  222<H>  4.3   |  22  |  3.88<H>    Ca    8.6      03 Oct 2023 06:23  Phos  3.1     10-  Mg     1.40     10    TPro  6.4  /  Alb  3.3  /  TBili  0.5  /  DBili  x   /  AST  35<H>  /  ALT  105<H>  /  AlkPhos  547<H>  10-03    PT/INR - ( 02 Oct 2023 06:27 )   PT: 11.1 sec;   INR: 0.99 ratio        PTT - ( 02 Oct 2023 06:27 )  PTT:37.9 sec      < from: TTE Limited W or WO Ultrasound Enhancing Agent (23 @ 17:23) >  ________________________________________________________________________________________  FINDINGS:    Pericardium:  No pericardial effusion seen.    Pleura:  Left pleural effusion noted.    < end of copied text >    < from: TTE Limited W or WO Ultrasound Enhancing Agent (23 @ 13:39) >  CONCLUSIONS:     1. Left ventricular systolic function is normal with an ejection fraction visually estimated at 60 to 65 %.  2. Left ventricle was not well visualized.  3. Probably normal systolic function.  4. There is no echocardiographic evidence of tamponade on this study.  5. Trace pericardial effusion.    < end of copied text >      < from: TTE Limited W or WO Ultrasound Enhancing Agent (23 @ 10:14) >  CONCLUSIONS:     1. Large pericardial effusion, measuring ~ 2.2 cm posterior to the left ventricle. Moderate pericardial effusion, measuring ~ 1.1 cm anterior to the right ventricle, ~ 1.7 cm adjacent to the RV free wall, ~ 1.4 cm lateral to the left ventricle, and ~ 1.3 cm around the LV apex. The right atrium and right ventricle appear partially compressed in the subcostal view. This may be consistent with early tamponade physiology. Unable to interpret respirophasic variability in the transmitral and transtricuspid spectral Doppler signals due to the presence of frequent premature supraventricular complexes.  2. Left pleural effusion noted.  3. Compared to the transthoracic echocardiogram performed on 2023 the pericardial effusion appears slightly larger on the current study.    < end of copied text >

## 2023-10-03 NOTE — PROGRESS NOTE ADULT - PROBLEM SELECTOR PLAN 1
--Likely multifactorial i/s/o acute on chronic HF +/- progressive renal failure +/- possible PNA. Lower suspicion for PE at this time  --TTE 08/23 w/ EF 55%, Mildly enlarged RV and Large pericardial effusion  --CT C/A/P w/ B/L GGO and mod B/L pleural effusions  --Pt oliguric. W/o sig improvement w/ Bumex gtt  --C/w HD for volume removal  --Dialysis dependent- will plan for tunneled cath placement per renal recs. IR Consulted, had recs reconsult next week once blood cx negative. Blood cx negative 09/24 (1 bottle),  repeat blood cx also NGTD  --Appreciate HF/Renal input  --Rest of mgt as below --Likely multifactorial i/s/o acute on chronic HF +/- progressive renal failure +/- possible PNA. Lower suspicion for PE at this time  --TTE 08/23 w/ EF 55%, Mildly enlarged RV and Large pericardial effusion  --CT C/A/P w/ B/L GGO and mod B/L pleural effusions  --Pt oliguric. W/o sig improvement w/ Bumex gtt  --C/w HD for volume removal  --Dialysis dependent- will plan for tunneled cath placement per renal recs. IR Consulted, had recs reconsult next week once blood cx negative. Blood cx negative 09/24 (1 bottle),  repeat blood cx also NGTD  -IR f/u appreciated, plan for tunneled cath placement in am   --Appreciate HF/Renal input  --Rest of mgt as below

## 2023-10-03 NOTE — PROGRESS NOTE ADULT - PROBLEM SELECTOR PLAN 1
Pt. with oliguric ISAMAR on CKD in setting of HF and fluid overload. On review of labs on [place], Scr elevated at 3.31 on 8/30/23, increased to 3.92 on admission (9/20). Pt. initiated on IV Bumex infusion on 9/21. However, pt. remained anuric despite increase in IV Bumex infusion rate. Scr increased to 3.89 on 9/22. Pt. underwent right IJ non-tunneled HD catheter placement, initiated on HD on 9/22/23. HD catheter functioning well. Monitor labs, VS and urine output. Avoid any potential nephrotoxins. Dose medications for HD. Plan for maintenance HD today.      If you have any questions, please feel free to contact me.  Ezequiel Sierra  Nephrology Fellow  [id number] / [employer] Teams (Preferred)  (After 4pm or on weekends, please call the on-call Fellow)

## 2023-10-03 NOTE — CHART NOTE - NSCHARTNOTEFT_GEN_A_CORE
Pt seen for nutrition follow up     Medical Course: Per chart 58 year old Female with CKD, severe pHTN, HFpEF presented w/ SOB , admitted for AHRF likely 2/2  volume overload admitted to MICU for urgent HD w/ course c/b obstructive shock in the setting of pericardial tamponade now s/p pericardial drain placement, transferred to medicine for further management.    Nutrition Course: Pt A&Ox4. Per RN flowsheets 0-50% intake of meals, Pt reports same. Prefers food from home and better appetite with food brought by family. Last BM 10/3 per RN flowsheets, has been having loose stool. Antibiotics discontinued 10/1. Offered Pt psyllium powder or probiotic to support gut health in attempt to reduce loose stool, Pt prefers to eat whole foods instead of taking medicine/supplements. Pt also is refusing to drink orgain supplement because she is afraid it will give more loose stool, will d/c per Pt preference. Senna and miralax ordered, Pt reports she has not taken these medications will recommend to d/c.  Pt new HD, plan for NPO at midnight with plan for permacath on 10/4. HD diet education provided to Pt, discussed sodium, potassium and phosphorous. Educated Pt that renal electrolytes currently within normal limits therefore diet to remain liberalized 2/2 poor PO intake. Discussed increased needs (kcal and protein) while on HD and importance of adequate PO intake. Discussed protein food sources and supplements, Pt delcined supplements at this time.     Diet Prescription: Diet, NPO:   NPO for Procedure/Test     NPO Start Date: 03-Oct-2023,   NPO Start Time: 23:59  Except Medications (10-02-23 @ 17:19)  Diet, Consistent Carbohydrate w/Evening Snack (09-25-23 @ 15:55)    Pertinent Medications: MEDICATIONS  (STANDING):  chlorhexidine 2% Cloths 1 Application(s) Topical <User Schedule>  chlorhexidine 2% Cloths 1 Application(s) Topical daily  dextrose 5%. 1000 milliLiter(s) (100 mL/Hr) IV Continuous <Continuous>  dextrose 5%. 1000 milliLiter(s) (50 mL/Hr) IV Continuous <Continuous>  dextrose 50% Injectable 25 Gram(s) IV Push once  dextrose 50% Injectable 12.5 Gram(s) IV Push once  dextrose 50% Injectable 25 Gram(s) IV Push once  glucagon  Injectable 1 milliGRAM(s) IntraMuscular once  insulin lispro (ADMELOG) corrective regimen sliding scale   SubCutaneous at bedtime  insulin lispro (ADMELOG) corrective regimen sliding scale   SubCutaneous three times a day before meals  levothyroxine 125 MICROGram(s) Oral daily  NIFEdipine XL 60 milliGRAM(s) Oral daily  pantoprazole    Tablet 40 milliGRAM(s) Oral before breakfast  polyethylene glycol 3350 17 Gram(s) Oral daily  senna 2 Tablet(s) Oral at bedtime  torsemide 60 milliGRAM(s) Oral daily    MEDICATIONS  (PRN):  dextrose Oral Gel 15 Gram(s) Oral once PRN Blood Glucose LESS THAN 70 milliGRAM(s)/deciliter  melatonin 3 milliGRAM(s) Oral at bedtime PRN Insomnia  sodium chloride 0.9% Bolus. 100 milliLiter(s) IV Bolus every 5 minutes PRN SBP LESS THAN or EQUAL to 90 mmHg    Pertinent Labs: 10-03 Na135 mmol/L Glu 222 mg/dL<H> K+ 4.3 mmol/L Cr  3.88 mg/dL<H> BUN 30 mg/dL<H> 10-03 Phos 3.1 mg/dL 10-03 Alb 3.3 g/dL     CAPILLARY BLOOD GLUCOSE  POCT Blood Glucose.: 163 mg/dL (03 Oct 2023 13:54)  POCT Blood Glucose.: 210 mg/dL (03 Oct 2023 10:35)  POCT Blood Glucose.: 246 mg/dL (03 Oct 2023 08:31)  POCT Blood Glucose.: 146 mg/dL (02 Oct 2023 22:10)  POCT Blood Glucose.: 240 mg/dL (02 Oct 2023 17:01)      Weight: Height (cm): 157.5 (09-20 @ 15:40), 157.5 (08-11 @ 17:17), 157.5 (08-05 @ 04:40)  Weight (kg): 51.5 (10-03 @ 15:58), 55.8 (08-12 @ 21:10), 62.8 (08-07 @ 13:38)  BMI (kg/m2): 20.8 (10-03 @ 15:58), 22.5 (09-20 @ 15:40), 22.5 (08-12 @ 21:10), 25.3 (08-11 @ 17:17), 25.3 (08-07 @ 13:38)  Weight Assessment: 9/22: 60.7kg, 9/26: 63kg, 10/3: 54kg (Weight trend likely reflective of fluid status, Pt admitted with volume overload now on HD)      Physical Assessment, per flowsheets:  Edema: +4 edema left and right legs  Skin: Intact w/ no pressure injuries noted per RN flowsheets       Estimated Needs:   [X] No change since previous assessment, based on dosing weight 133 lbs / 60.7 kg  28-32 kcal/kg (1278-8326 kcal)  1.2-1.4 g/kg (72-84 g pro)    Previous Nutrition Diagnosis: [ x] Inadequate Energy Intake, altered nutrition related labs  Nutrition Diagnosis is [x ] ongoing    New Nutrition Dx: Increased nutrient needs (kcal and protien) related to increased demand as evidence by new HD    Education:  [  x] Given today             Type of Education Provided: New HD nutrition education provided to Pt.     Interventions:   1) Continue CSTCHO diet   2) D/c Orgain supplement per Pt preference   3) Replete electrolytes prn   4) Recommend d/c miralax and senna 2/2 loose stool.  5) Obtain pre/post HD weights   6) RD available prn    Monitor & Evaluate:  PO intake, tolerance to diet/supplement, nutrition related lab values, weight trends, BMs/GI distress, hydration status, skin integrity.    [place]| [id number] (Also available on TEAMS) Pt seen for nutrition follow up     Medical Course: Per chart 58 year old Female with CKD, severe pHTN, HFpEF presented w/ SOB , admitted for AHRF likely 2/2  volume overload admitted to MICU for urgent HD w/ course c/b obstructive shock in the setting of pericardial tamponade now s/p pericardial drain placement, transferred to medicine for further management.    Nutrition Course: Pt A&Ox4. Per RN flowsheets 0-50% intake of meals, Pt reports same. Prefers food from home and better appetite. Last BM 10/3 per RN flowsheets, has been having loose stool. Antibiotics discontinued 10/1. Offered Pt psyllium powder or probiotic to support gut health in attempt to reduce loose stool, Pt prefers to eat whole foods instead of taking medicine/supplements. Pt also is refusing to drink orgain supplement because she is afraid it will give more loose stool, will d/c per Pt preference. Senna and miralax ordered, Pt reports she has not taken these medications will recommend to d/c.  Pt new HD, plan for NPO at midnight with plan for permacath on 10/4. HD diet education provided to Pt, discussed sodium, potassium and phosphorous. Educated Pt that renal electrolytes currently within normal limits therefore diet to remain liberalized 2/2 poor PO intake. Discussed increased needs (kcal and protein) while on HD and importance of adequate PO intake. Discussed protein food sources and supplements, Pt declined supplements at this time.     Now meets criteria for Moderate malnutrition in setting of acute illness as evidence by <75% estimated needs >7 days, 15% weight loss x1 week.    Diet Prescription: Diet, NPO:   NPO for Procedure/Test     NPO Start Date: 03-Oct-2023,   NPO Start Time: 23:59  Except Medications (10-02-23 @ 17:19)  Diet, Consistent Carbohydrate w/Evening Snack (09-25-23 @ 15:55)    Pertinent Medications: MEDICATIONS  (STANDING):  chlorhexidine 2% Cloths 1 Application(s) Topical <User Schedule>  chlorhexidine 2% Cloths 1 Application(s) Topical daily  dextrose 5%. 1000 milliLiter(s) (100 mL/Hr) IV Continuous <Continuous>  dextrose 5%. 1000 milliLiter(s) (50 mL/Hr) IV Continuous <Continuous>  dextrose 50% Injectable 25 Gram(s) IV Push once  dextrose 50% Injectable 12.5 Gram(s) IV Push once  dextrose 50% Injectable 25 Gram(s) IV Push once  glucagon  Injectable 1 milliGRAM(s) IntraMuscular once  insulin lispro (ADMELOG) corrective regimen sliding scale   SubCutaneous at bedtime  insulin lispro (ADMELOG) corrective regimen sliding scale   SubCutaneous three times a day before meals  levothyroxine 125 MICROGram(s) Oral daily  NIFEdipine XL 60 milliGRAM(s) Oral daily  pantoprazole    Tablet 40 milliGRAM(s) Oral before breakfast  polyethylene glycol 3350 17 Gram(s) Oral daily  senna 2 Tablet(s) Oral at bedtime  torsemide 60 milliGRAM(s) Oral daily    MEDICATIONS  (PRN):  dextrose Oral Gel 15 Gram(s) Oral once PRN Blood Glucose LESS THAN 70 milliGRAM(s)/deciliter  melatonin 3 milliGRAM(s) Oral at bedtime PRN Insomnia  sodium chloride 0.9% Bolus. 100 milliLiter(s) IV Bolus every 5 minutes PRN SBP LESS THAN or EQUAL to 90 mmHg    Pertinent Labs: 10-03 Na135 mmol/L Glu 222 mg/dL<H> K+ 4.3 mmol/L Cr  3.88 mg/dL<H> BUN 30 mg/dL<H> 10-03 Phos 3.1 mg/dL 10-03 Alb 3.3 g/dL     CAPILLARY BLOOD GLUCOSE  POCT Blood Glucose.: 163 mg/dL (03 Oct 2023 13:54)  POCT Blood Glucose.: 210 mg/dL (03 Oct 2023 10:35)  POCT Blood Glucose.: 246 mg/dL (03 Oct 2023 08:31)  POCT Blood Glucose.: 146 mg/dL (02 Oct 2023 22:10)  POCT Blood Glucose.: 240 mg/dL (02 Oct 2023 17:01)      Weight: Height (cm): 157.5 (09-20 @ 15:40), 157.5 (08-11 @ 17:17), 157.5 (08-05 @ 04:40)  Weight (kg): 51.5 (10-03 @ 15:58), 55.8 (08-12 @ 21:10), 62.8 (08-07 @ 13:38)  BMI (kg/m2): 20.8 (10-03 @ 15:58), 22.5 (09-20 @ 15:40), 22.5 (08-12 @ 21:10), 25.3 (08-11 @ 17:17), 25.3 (08-07 @ 13:38)  Weight Assessment: 9/22: 60.7kg, 9/26: 63kg, 10/3: 54kg (Weight trend likely reflective of fluid status, HD, and poor PO intake)      Physical Assessment, per flowsheets:  Edema: +4 edema left and right legs  Skin: Intact w/ no pressure injuries noted per RN flowsheets       Estimated Needs:   [X] No change since previous assessment, based on dosing weight 133 lbs / 60.7 kg  28-32 kcal/kg (0547-6648 kcal)  1.2-1.4 g/kg (72-84 g pro)    Previous Nutrition Diagnosis: [ x] Inadequate Energy Intake, altered nutrition related labs  Nutrition Diagnosis is [x ] ongoing    New Nutrition Dx: Moderate protein calorie malnutrition in setting of acute illness as evidence by <75% estimated needs >7 days, 15% weight loss x1 week.    Education:  [  x] Given today             Type of Education Provided: New HD nutrition education provided to Pt in setting of acute illness as evidence by <75% estimated needs >7 days, 15% weight loss x1 week.    Interventions:   1) Continue CSTCHO diet   2) D/c Orgain supplement per Pt preference   3) Replete electrolytes prn   4) Recommend d/c miralax and senna 2/2 loose stool.  5) Obtain pre/post HD weights   6) RD available prn    Monitor & Evaluate:  PO intake, tolerance to diet/supplement, nutrition related lab values, weight trends, BMs/GI distress, hydration status, skin integrity.    [place]affe [place], RD| [id number] (Also available on TEAMS)

## 2023-10-03 NOTE — PROGRESS NOTE ADULT - PROBLEM SELECTOR PLAN 7
--Noted. Suspect 2/2 ischemia i/s/o shock  --Liver unremarkable on CTA A/P  --Avoid hepatotoxic agents  --Trend LFTs --Noted. Suspect 2/2 ischemia i/s/o shock  --Liver unremarkable on CTA A/P  --Avoid hepatotoxic agents  --Trend LFTs, trending down

## 2023-10-03 NOTE — PROGRESS NOTE ADULT - PROBLEM SELECTOR PLAN 1
HD M-W-F  Procardia XL 30mg daily (hold SBP<90)   Strict I/O  Daily standing weights  Monitor lytes replete K>4.0 and Mg>2.0   Trend SCr  TTE evaluate pericardial effusion s/p drain removal; ordered/pending    CXR follow up on worsening pleural effusion; ordered/pending       Management per primary team  Appreciate Nephrology recommendations   Pending final recommendations from HF attending Currently still moderately hypervolemic and remains hypertensive.   Suggest more fluid removal in HD today and tomorrow.   As d/w Dr. Coyle, also add torsemide 60 mg po daily as she is non oliguric.  Increase Procardia to 60 mg po BID.   Monitor lytes replete K to keep 4.0-5.0 and Mg>2.0   Daily standing weights  Strict I/O.

## 2023-10-03 NOTE — PROGRESS NOTE ADULT - PROBLEM SELECTOR PLAN 12
DVT: HSQ  DIET: cc  DISPO: Pending hospital course,   -PT consulted recs rehab  plan of care d/w pt and ACP DVT: HSQ  DIET: cc  DISPO:  home Pending hospital course,   -PT now rec outpt PT   plan of care d/w pt and ACP

## 2023-10-03 NOTE — PROGRESS NOTE ADULT - TIME BILLING
reviewing laboratory data, consultants' recommendations, documentation in [place], performing medically appropriate examinations/evaluations, discussion with patient/family/RN/ACP/Residents and interdisciplinary staff (such as , social workers, etc), counseling patient/family/care giver, ordering medically appropriate medication, tests, or procedures
reviewing laboratory data, consultants' recommendations, documentation in [place], performing medically appropriate examinations/evaluations, discussion with patient/family/RN/ACP/Residents and interdisciplinary staff (such as , social workers, etc), counseling patient/family/care giver, ordering medically appropriate medication, tests, or procedures
reviewing laboratory data, consultants' recommendations, documentation in [place], performing medically appropriate examinations/evaluations, discussion with patient/family/RN/ACP/Residents and interdisciplinary staff (such as , social workers, etc), counseling patient/family/care giver, ordering medically appropriate medication, tests, or procedures
reviewing laboratory data, consultants' recommendations, documentation in [place], performing medically appropriate examinations/evaluations, discussion with patient/family/RN/ACP/Residents and interdisciplinary staff (such as , social workers, etc), counseling patient/family/care giver, ordering medically appropriate medication, tests, or procedures
Time spent includes direct patient care  (interview and examination of patient), discussion with other providers, support staff and/or patient's family members, review of medical records, ordering diagnostic tests and analyzing results, and documentation.

## 2023-10-03 NOTE — PROGRESS NOTE ADULT - PROBLEM SELECTOR PLAN 8
--Pressures stable off PO anti-Hypertensives   --Continue to hold for now, resume as tolerated  --Monitor pressures pt noted to have elevated BP as was off her antihypertensives   resumed procardia xl 60 mg , also added Torsemide  --Monitor pressures, will adjust BP meds as needed

## 2023-10-03 NOTE — PROGRESS NOTE ADULT - PROBLEM SELECTOR PLAN 2
--Pt previously w/ obstructive shock i/s/o pericardial effusion w/ tamponade.  --s/p pericardial drain placement and removal  --Now resolved  --Continue tele monitoring  --F/u cards recs --Pt previously w/ obstructive shock i/s/o pericardial effusion w/ tamponade.  --s/p pericardial drain placement and removal  --Now resolved  --Continue tele monitoring  --HF f/u appreciated, agree with increasing procardia to 60 mg daily, may need to be increased to BID if BP not controlled, HF also rec adding Torsemide 60 mg as pt still making urine   - will CTM

## 2023-10-03 NOTE — PROGRESS NOTE ADULT - ASSESSMENT
58 year old Female with PMH of  HFpEF ( TTE: Normal LV systolic function, Right ventricular enlargement with normal RV systolic function, Moderate-severe TR and PASP equals 65 consistent with PH.      HTN, HLD, DM2, asthma, CKD (baseline SCr ~2.0), hypothyroidism c/b myxedema coma in past), anemia requiring recent transfusions (followed by GI with plan for scope), and HFpEF with severe pulmonary hypertension (EF 64%;LVIDd 4.6 RVE with normal RV function, mod-severe TR and severe PH RVSP~65). Patient presented with c/o progressive worsening SOB/MARKS/PND and 3 pillow orthopnea X 5days; treated in the ED with IV Lasix 40mg followed by IV Bumex 2mg BID.    9/22- Patient with worsening SCr and signs of uremia requiring emergency HD and transfer to MICU. Subsequently, she became hypotensive requiring pressor support.       RHC (8/23):  RA 17 PA 51/22/36  PCWP  25  CO/CI 6.7/4.2     and  PVR 2.6 POWERS       9/24 TTE revealed large pericardial effusion, underwent pericardiocentesis with sanguinous output; drain removed on 9/28 9/25 TTE revealed trace pericardial effusion 58 year old Female with PMH of  HFpEF, moderate-severe TR and pulm HTN, HTN, HLD, DM2, asthma, CKD (baseline SCr ~2.0), hypothyroidism c/b myxedema coma in past), anemia requiring recent transfusions, presented with c/o progressive worsening SOB/MARKS/PND and 3 pillow orthopnea X 5days; treated in the ED with IV Lasix 40mg followed by IV Bumex 2mg BID. 9/22- Patient with worsening SCr and signs of uremia requiring emergency HD and transfer to MICU. Subsequently, she became hypotensive requiring pressor support. On 9/24 TTE revealed large pericardial effusion, underwent pericardiocentesis with sanguinous output; drain removed on 9/28. 9/25 TTE revealed trace pericardial effusion. Overall, she is stage C, NYHA class III ESRD now on HD- currently still moderately hypervolemic and remains hypertensive.

## 2023-10-03 NOTE — PROGRESS NOTE ADULT - PROBLEM SELECTOR PLAN 5
--Pt w/ new diarrhea suspect 2/2 antibiotics  --Now improving  --s/p Abx  --GI PCR/ O/P negative,  --stool cx with yeast like cells   --Monitor stool count

## 2023-10-03 NOTE — PROGRESS NOTE ADULT - PROBLEM SELECTOR PLAN 6
--Likely 2/2 anemia of chronic disease + Acute blood loss anemia i/s/o recent bleed from pericardial drain site  --Anemia w/o c/w ACD  --Monitor Hgb  --Transfuse Hgb < 7, H/H currently stable

## 2023-10-03 NOTE — PROGRESS NOTE ADULT - SUBJECTIVE AND OBJECTIVE BOX
Patient is a 58y old  Female who presents with a chief complaint of progressive dyspnea (02 Oct 2023 17:20)      SUBJECTIVE / OVERNIGHT EVENTS:    MEDICATIONS  (STANDING):  chlorhexidine 2% Cloths 1 Application(s) Topical <User Schedule>  dextrose 5%. 1000 milliLiter(s) (100 mL/Hr) IV Continuous <Continuous>  dextrose 5%. 1000 milliLiter(s) (50 mL/Hr) IV Continuous <Continuous>  dextrose 50% Injectable 25 Gram(s) IV Push once  dextrose 50% Injectable 12.5 Gram(s) IV Push once  dextrose 50% Injectable 25 Gram(s) IV Push once  glucagon  Injectable 1 milliGRAM(s) IntraMuscular once  insulin lispro (ADMELOG) corrective regimen sliding scale   SubCutaneous at bedtime  insulin lispro (ADMELOG) corrective regimen sliding scale   SubCutaneous three times a day before meals  levothyroxine 125 MICROGram(s) Oral daily  NIFEdipine XL 60 milliGRAM(s) Oral daily  pantoprazole    Tablet 40 milliGRAM(s) Oral before breakfast  polyethylene glycol 3350 17 Gram(s) Oral daily  senna 2 Tablet(s) Oral at bedtime    MEDICATIONS  (PRN):  dextrose Oral Gel 15 Gram(s) Oral once PRN Blood Glucose LESS THAN 70 milliGRAM(s)/deciliter  melatonin 3 milliGRAM(s) Oral at bedtime PRN Insomnia      Vital Signs Last 24 Hrs  T(C): 36.3 (03 Oct 2023 08:27), Max: 36.3 (02 Oct 2023 12:00)  T(F): 97.3 (03 Oct 2023 08:27), Max: 97.4 (02 Oct 2023 12:00)  HR: 82 (03 Oct 2023 08:27) (73 - 85)  BP: 148/71 (03 Oct 2023 08:27) (128/56 - 185/83)  BP(mean): --  RR: 18 (03 Oct 2023 08:27) (18 - 18)  SpO2: 100% (03 Oct 2023 08:27) (94% - 100%)    Parameters below as of 03 Oct 2023 08:27  Patient On (Oxygen Delivery Method): room air      CAPILLARY BLOOD GLUCOSE      POCT Blood Glucose.: 246 mg/dL (03 Oct 2023 08:31)  POCT Blood Glucose.: 146 mg/dL (02 Oct 2023 22:10)  POCT Blood Glucose.: 240 mg/dL (02 Oct 2023 17:01)  POCT Blood Glucose.: 132 mg/dL (02 Oct 2023 11:02)    I&O's Summary    02 Oct 2023 07:01  -  03 Oct 2023 07:00  --------------------------------------------------------  IN: 560 mL / OUT: 600 mL / NET: -40 mL        PHYSICAL EXAM:  GENERAL: NAD, well-developed  HEAD:  Atraumatic, Normocephalic  EYES: EOMI, PERRLA, conjunctiva and sclera clear  NECK: Supple, No JVD  CHEST/LUNG: Clear to auscultation bilaterally; No wheeze  HEART: Regular rate and rhythm; No murmurs, rubs, or gallops  ABDOMEN: Soft, Nontender, Nondistended; Bowel sounds present  EXTREMITIES:  2+ Peripheral Pulses, No clubbing, cyanosis, or edema  PSYCH: AAOx3  NEUROLOGY: non-focal  SKIN: No rashes or lesions    LABS:                        8.6    8.23  )-----------( 77       ( 03 Oct 2023 06:23 )             26.3     10-03    135  |  99  |  30<H>  ----------------------------<  222<H>  4.3   |  22  |  3.88<H>    Ca    8.6      03 Oct 2023 06:23  Phos  3.1     10-03  Mg     1.40     10-03    TPro  6.4  /  Alb  3.3  /  TBili  0.5  /  DBili  x   /  AST  35<H>  /  ALT  105<H>  /  AlkPhos  547<H>  10-03    PT/INR - ( 02 Oct 2023 06:27 )   PT: 11.1 sec;   INR: 0.99 ratio         PTT - ( 02 Oct 2023 06:27 )  PTT:37.9 sec      Urinalysis Basic - ( 03 Oct 2023 06:23 )    Color: x / Appearance: x / SG: x / pH: x  Gluc: 222 mg/dL / Ketone: x  / Bili: x / Urobili: x   Blood: x / Protein: x / Nitrite: x   Leuk Esterase: x / RBC: x / WBC x   Sq Epi: x / Non Sq Epi: x / Bacteria: x        RADIOLOGY & ADDITIONAL TESTS:    Imaging Personally Reviewed:    Consultant(s) Notes Reviewed:      Care Discussed with Consultants/Other Providers:   Patient is a 58y old  Female who presents with a chief complaint of progressive dyspnea (02 Oct 2023 17:20)      SUBJECTIVE / OVERNIGHT EVENTS: patient seen and examined by bedside, pt reports feeling better, denies headache, dizziness, SOB, CP, Palpitations , N/V/D, abdominal pain, pt afebrile  Tele : NSR, no acute events       MEDICATIONS  (STANDING):  chlorhexidine 2% Cloths 1 Application(s) Topical <User Schedule>  dextrose 5%. 1000 milliLiter(s) (100 mL/Hr) IV Continuous <Continuous>  dextrose 5%. 1000 milliLiter(s) (50 mL/Hr) IV Continuous <Continuous>  dextrose 50% Injectable 25 Gram(s) IV Push once  dextrose 50% Injectable 12.5 Gram(s) IV Push once  dextrose 50% Injectable 25 Gram(s) IV Push once  glucagon  Injectable 1 milliGRAM(s) IntraMuscular once  insulin lispro (ADMELOG) corrective regimen sliding scale   SubCutaneous at bedtime  insulin lispro (ADMELOG) corrective regimen sliding scale   SubCutaneous three times a day before meals  levothyroxine 125 MICROGram(s) Oral daily  NIFEdipine XL 60 milliGRAM(s) Oral daily  pantoprazole    Tablet 40 milliGRAM(s) Oral before breakfast  polyethylene glycol 3350 17 Gram(s) Oral daily  senna 2 Tablet(s) Oral at bedtime    MEDICATIONS  (PRN):  dextrose Oral Gel 15 Gram(s) Oral once PRN Blood Glucose LESS THAN 70 milliGRAM(s)/deciliter  melatonin 3 milliGRAM(s) Oral at bedtime PRN Insomnia      Vital Signs Last 24 Hrs  T(C): 36.3 (03 Oct 2023 08:27), Max: 36.3 (02 Oct 2023 12:00)  T(F): 97.3 (03 Oct 2023 08:27), Max: 97.4 (02 Oct 2023 12:00)  HR: 82 (03 Oct 2023 08:27) (73 - 85)  BP: 148/71 (03 Oct 2023 08:27) (128/56 - 185/83)  BP(mean): --  RR: 18 (03 Oct 2023 08:27) (18 - 18)  SpO2: 100% (03 Oct 2023 08:27) (94% - 100%)    Parameters below as of 03 Oct 2023 08:27  Patient On (Oxygen Delivery Method): room air      CAPILLARY BLOOD GLUCOSE      POCT Blood Glucose.: 246 mg/dL (03 Oct 2023 08:31)  POCT Blood Glucose.: 146 mg/dL (02 Oct 2023 22:10)  POCT Blood Glucose.: 240 mg/dL (02 Oct 2023 17:01)  POCT Blood Glucose.: 132 mg/dL (02 Oct 2023 11:02)    I&O's Summary    02 Oct 2023 07:01  -  03 Oct 2023 07:00  --------------------------------------------------------  IN: 560 mL / OUT: 600 mL / NET: -40 mL      PHYSICAL EXAM:  GENERAL: NAD  HEENT:  Atraumatic, Normocephalic, EOMI, conjunctiva and sclera clear  NECK: Supple, R IJ Non tunneled cath (dressing c/d/i)  CHEST/LUNG: Clear to auscultation bilaterally; No wheeze, crackles or rhonchi   HEART: Regular rate and rhythm; Normal S1 S2, No murmurs, rubs, or gallops. s/p pericardial drain removal , site c/d/i w/o bleed  ABDOMEN: Soft, Nontender, Nondistended; Bowel sounds present  EXTREMITIES:  2+ Peripheral Pulses, No edema  PSYCH: AAOx3  NEUROLOGY: non-focal  SKIN: Warm and dry        LABS:                        8.6    8.23  )-----------( 77       ( 03 Oct 2023 06:23 )             26.3     10-03    135  |  99  |  30<H>  ----------------------------<  222<H>  4.3   |  22  |  3.88<H>    Ca    8.6      03 Oct 2023 06:23  Phos  3.1     10-03  Mg     1.40     10-03    TPro  6.4  /  Alb  3.3  /  TBili  0.5  /  DBili  x   /  AST  35<H>  /  ALT  105<H>  /  AlkPhos  547<H>  10-03    PT/INR - ( 02 Oct 2023 06:27 )   PT: 11.1 sec;   INR: 0.99 ratio         PTT - ( 02 Oct 2023 06:27 )  PTT:37.9 sec      Urinalysis Basic - ( 03 Oct 2023 06:23 )    Color: x / Appearance: x / SG: x / pH: x  Gluc: 222 mg/dL / Ketone: x  / Bili: x / Urobili: x   Blood: x / Protein: x / Nitrite: x   Leuk Esterase: x / RBC: x / WBC x   Sq Epi: x / Non Sq Epi: x / Bacteria: x        RADIOLOGY & ADDITIONAL TESTS:    Imaging Personally Reviewed:    Consultant(s) Notes Reviewed:  nephrology, Heart failure, IR     Care Discussed with Consultants/Other Providers: Heart failure attending, Dr Coyle

## 2023-10-03 NOTE — PROGRESS NOTE ADULT - PROBLEM SELECTOR PLAN 2
Patient to get tunneled catheter for HD.   Suggest more fluid removal today and tomorrow.   As d/w Dr. Coyle, also add torsemide 60 mg po daily as she is non oliguric.

## 2023-10-04 ENCOUNTER — OUTPATIENT (OUTPATIENT)
Dept: OUTPATIENT SERVICES | Facility: HOSPITAL | Age: 58
LOS: 1 days | Discharge: HOME | End: 2023-10-04

## 2023-10-04 DIAGNOSIS — Z98.49 CATARACT EXTRACTION STATUS, UNSPECIFIED EYE: Chronic | ICD-10-CM

## 2023-10-04 LAB
ALBUMIN SERPL ELPH-MCNC: 3.3 G/DL — SIGNIFICANT CHANGE UP (ref 3.3–5)
ALP SERPL-CCNC: 465 U/L — HIGH (ref 40–120)
ALT FLD-CCNC: 78 U/L — HIGH (ref 4–33)
ANION GAP SERPL CALC-SCNC: 13 MMOL/L — SIGNIFICANT CHANGE UP (ref 7–14)
APTT BLD: 37 SEC — HIGH (ref 24.5–35.6)
AST SERPL-CCNC: 31 U/L — SIGNIFICANT CHANGE UP (ref 4–32)
BILIRUB DIRECT SERPL-MCNC: <0.2 MG/DL — SIGNIFICANT CHANGE UP (ref 0–0.3)
BILIRUB INDIRECT FLD-MCNC: >0.2 MG/DL — SIGNIFICANT CHANGE UP (ref 0–1)
BILIRUB SERPL-MCNC: 0.4 MG/DL — SIGNIFICANT CHANGE UP (ref 0.2–1.2)
BLD GP AB SCN SERPL QL: NEGATIVE — SIGNIFICANT CHANGE UP
BUN SERPL-MCNC: 20 MG/DL — SIGNIFICANT CHANGE UP (ref 7–23)
CALCIUM SERPL-MCNC: 8.1 MG/DL — LOW (ref 8.4–10.5)
CHLORIDE SERPL-SCNC: 97 MMOL/L — LOW (ref 98–107)
CO2 SERPL-SCNC: 27 MMOL/L — SIGNIFICANT CHANGE UP (ref 22–31)
CREAT SERPL-MCNC: 2.87 MG/DL — HIGH (ref 0.5–1.3)
EGFR: 19 ML/MIN/1.73M2 — LOW
GLUCOSE BLDC GLUCOMTR-MCNC: 104 MG/DL — HIGH (ref 70–99)
GLUCOSE BLDC GLUCOMTR-MCNC: 113 MG/DL — HIGH (ref 70–99)
GLUCOSE BLDC GLUCOMTR-MCNC: 126 MG/DL — HIGH (ref 70–99)
GLUCOSE BLDC GLUCOMTR-MCNC: 244 MG/DL — HIGH (ref 70–99)
GLUCOSE BLDC GLUCOMTR-MCNC: 380 MG/DL — HIGH (ref 70–99)
GLUCOSE SERPL-MCNC: 220 MG/DL — HIGH (ref 70–99)
HCT VFR BLD CALC: 23.6 % — LOW (ref 34.5–45)
HGB BLD-MCNC: 7.5 G/DL — LOW (ref 11.5–15.5)
INR BLD: 1.04 RATIO — SIGNIFICANT CHANGE UP (ref 0.85–1.18)
MAGNESIUM SERPL-MCNC: 1.4 MG/DL — LOW (ref 1.6–2.6)
MCHC RBC-ENTMCNC: 26.5 PG — LOW (ref 27–34)
MCHC RBC-ENTMCNC: 31.8 GM/DL — LOW (ref 32–36)
MCV RBC AUTO: 83.4 FL — SIGNIFICANT CHANGE UP (ref 80–100)
NRBC # BLD: 0 /100 WBCS — SIGNIFICANT CHANGE UP (ref 0–0)
NRBC # FLD: 0 K/UL — SIGNIFICANT CHANGE UP (ref 0–0)
PHOSPHATE SERPL-MCNC: 2 MG/DL — LOW (ref 2.5–4.5)
PLATELET # BLD AUTO: 82 K/UL — LOW (ref 150–400)
POTASSIUM SERPL-MCNC: 3.8 MMOL/L — SIGNIFICANT CHANGE UP (ref 3.5–5.3)
POTASSIUM SERPL-SCNC: 3.8 MMOL/L — SIGNIFICANT CHANGE UP (ref 3.5–5.3)
PROT SERPL-MCNC: 6.2 G/DL — SIGNIFICANT CHANGE UP (ref 6–8.3)
PROTHROM AB SERPL-ACNC: 11.6 SEC — SIGNIFICANT CHANGE UP (ref 9.5–13)
RBC # BLD: 2.83 M/UL — LOW (ref 3.8–5.2)
RBC # FLD: 17.4 % — HIGH (ref 10.3–14.5)
RH IG SCN BLD-IMP: POSITIVE — SIGNIFICANT CHANGE UP
SODIUM SERPL-SCNC: 137 MMOL/L — SIGNIFICANT CHANGE UP (ref 135–145)
UNFRACTIONATED HEPARIN INTERPRETATION: SIGNIFICANT CHANGE UP
UNFRACTIONATED HEPARIN RESULT: NEGATIVE — SIGNIFICANT CHANGE UP
UNFRACTIONATED HEPARIN-HIGH DOSE: 0 % — SIGNIFICANT CHANGE UP
UNFRACTIONATED HEPARIN-LOW DOSE: 0 % — SIGNIFICANT CHANGE UP
WBC # BLD: 6.72 K/UL — SIGNIFICANT CHANGE UP (ref 3.8–10.5)
WBC # FLD AUTO: 6.72 K/UL — SIGNIFICANT CHANGE UP (ref 3.8–10.5)

## 2023-10-04 PROCEDURE — 99232 SBSQ HOSP IP/OBS MODERATE 35: CPT

## 2023-10-04 RX ORDER — INSULIN LISPRO 100/ML
VIAL (ML) SUBCUTANEOUS EVERY 6 HOURS
Refills: 0 | Status: DISCONTINUED | OUTPATIENT
Start: 2023-10-04 | End: 2023-10-05

## 2023-10-04 RX ORDER — MAGNESIUM SULFATE 500 MG/ML
1 VIAL (ML) INJECTION ONCE
Refills: 0 | Status: COMPLETED | OUTPATIENT
Start: 2023-10-04 | End: 2023-10-04

## 2023-10-04 RX ADMIN — Medication 100 GRAM(S): at 12:53

## 2023-10-04 RX ADMIN — Medication 60 MILLIGRAM(S): at 11:18

## 2023-10-04 RX ADMIN — Medication 2: at 05:55

## 2023-10-04 RX ADMIN — Medication 125 MICROGRAM(S): at 04:54

## 2023-10-04 RX ADMIN — PANTOPRAZOLE SODIUM 40 MILLIGRAM(S): 20 TABLET, DELAYED RELEASE ORAL at 05:52

## 2023-10-04 RX ADMIN — Medication 60 MILLIGRAM(S): at 11:19

## 2023-10-04 RX ADMIN — Medication 5: at 23:37

## 2023-10-04 RX ADMIN — CHLORHEXIDINE GLUCONATE 1 APPLICATION(S): 213 SOLUTION TOPICAL at 05:52

## 2023-10-04 RX ADMIN — CHLORHEXIDINE GLUCONATE 1 APPLICATION(S): 213 SOLUTION TOPICAL at 12:51

## 2023-10-04 NOTE — PROGRESS NOTE ADULT - PROBLEM SELECTOR PLAN 1
--Likely multifactorial i/s/o acute on chronic HF +/- progressive renal failure +/- possible PNA. Lower suspicion for PE at this time  --TTE 08/23 w/ EF 55%, Mildly enlarged RV and Large pericardial effusion  --CT C/A/P w/ B/L GGO and mod B/L pleural effusions  --Pt oliguric. W/o sig improvement w/ Bumex gtt  --C/w HD for volume removal  --Dialysis dependent- will plan for tunneled cath placement per renal recs. IR Consulted, had recs reconsult next week once blood cx negative. Blood cx negative 09/24 (1 bottle),  repeat blood cx also NGTD  -IR f/u appreciated, plan for tunneled cath placement in am   --Appreciate HF/Renal input  --Rest of mgt as below --Likely multifactorial i/s/o acute on chronic HF +/- progressive renal failure +/- possible PNA. Lower suspicion for PE at this time  --TTE 08/23 w/ EF 55%, Mildly enlarged RV and Large pericardial effusion  --CT C/A/P w/ B/L GGO and mod B/L pleural effusions  --Pt oliguric. W/o sig improvement w/ Bumex gtt  --C/w HD for volume removal  --Dialysis dependent-  plan for tunneled cath placement by  IR today  - Blood cx negative 09/24 (1 bottle),  repeat blood cx also NGTD  --Appreciate HF/Renal input  --Rest of mgt as below

## 2023-10-04 NOTE — PROGRESS NOTE ADULT - PROBLEM SELECTOR PLAN 12
DVT: HSQ  DIET: cc  DISPO:  home Pending hospital course,   -PT now rec outpt PT   plan of care d/w pt and ACP

## 2023-10-04 NOTE — CHART NOTE - NSCHARTNOTEFT_GEN_A_CORE
59 yo F with CKD, severe pHTN, HFpEF presented w/ SOB , admitted for AHRF likely 2/2  volume overload admitted to MICU for urgent HD w/ course c/b obstructive shock in the setting of uremic pericardial tamponade now s/p pericardial drain placement and removal and transferred to medicine floors for further management. Hematology consulted for thrombocytopenia and c/f HIT.     # Thrombocytopenia  - PF4 Ab negative-> less likely HIT  - thrombocytopenia likely in the setting of acute infection and drug induced (zosyn can cause cytopenia)   - TCP now improved.   - Heme to sign off. Pls call back if there is any questions.

## 2023-10-04 NOTE — PROGRESS NOTE ADULT - PROBLEM SELECTOR PLAN 8
pt noted to have elevated BP as was off her antihypertensives   resumed procardia xl 60 mg , also added Torsemide  --Monitor pressures, will adjust BP meds as needed

## 2023-10-04 NOTE — PROGRESS NOTE ADULT - PROBLEM SELECTOR PLAN 2
--Pt previously w/ obstructive shock i/s/o pericardial effusion w/ tamponade.  --s/p pericardial drain placement and removal  --Now resolved  --Continue tele monitoring  --HF f/u appreciated, agree with increasing procardia to 60 mg daily, may need to be increased to BID if BP not controlled, HF also rec adding Torsemide 60 mg as pt still making urine   - will CTM

## 2023-10-04 NOTE — PROGRESS NOTE ADULT - PROBLEM SELECTOR PLAN 7
--Noted. Suspect 2/2 ischemia i/s/o shock  --Liver unremarkable on CTA A/P  --Avoid hepatotoxic agents  --Trend LFTs, trending down

## 2023-10-04 NOTE — CHART NOTE - NSCHARTNOTEFT_GEN_A_CORE
IR Follow Up    patient was tentatively scheduled for tunneled HD catheter placement. patient was dialyzed today and is yet to have post dialysis repeat labs. case to be cancelled today and rescheduled for tomorrow.    -discussed with primary team    Jamshid Somers MD  PGY-VI, Interventional Radiology Integrated Resident    -Available on [employer] TEAMS  -Emergent issues: [facility]-p.[phone]; [facility]-p.[phone] ([phone])  -Non-emergent consults: Please place a [facility] order "IR Consult" with an appropriate callback number  -Scheduling questions: [facility]: [phone]; [facility]: [phone]  -Clinic/Outpatient booking: [facility]: [phone]; [facility]: [phone]

## 2023-10-04 NOTE — PROGRESS NOTE ADULT - PROBLEM SELECTOR PLAN 4
Noted. Suspect i/s/o abx +/- infection. Diff incl HIT  --d/c abx  --Heparin discontinued  --Obtain BETZY, Hep Ab  --Blue top platelet 52  --Obtain Serum fibrinogen /coags  --Heme consulted given intermediate probability of HIT Noted. Suspect i/s/o abx +/- infection. Diff incl HIT  --d/c abx  --Heparin discontinued  --Heme consulted given intermediate probability of HIT,  PF4 Ab negative-> less likely HIT  - thrombocytopenia likely in the setting of acute infection and drug induced (zosyn can cause cytopenia)   - will CTM

## 2023-10-05 ENCOUNTER — TRANSCRIPTION ENCOUNTER (OUTPATIENT)
Age: 58
End: 2023-10-05

## 2023-10-05 PROBLEM — I50.30 UNSPECIFIED DIASTOLIC (CONGESTIVE) HEART FAILURE: Chronic | Status: ACTIVE | Noted: 2023-09-21

## 2023-10-05 LAB
ANION GAP SERPL CALC-SCNC: 13 MMOL/L — SIGNIFICANT CHANGE UP (ref 7–14)
APTT BLD: 35.9 SEC — HIGH (ref 24.5–35.6)
BUN SERPL-MCNC: 14 MG/DL — SIGNIFICANT CHANGE UP (ref 7–23)
CALCIUM SERPL-MCNC: 8.1 MG/DL — LOW (ref 8.4–10.5)
CHLORIDE SERPL-SCNC: 98 MMOL/L — SIGNIFICANT CHANGE UP (ref 98–107)
CO2 SERPL-SCNC: 25 MMOL/L — SIGNIFICANT CHANGE UP (ref 22–31)
CREAT SERPL-MCNC: 2.6 MG/DL — HIGH (ref 0.5–1.3)
CULTURE RESULTS: SIGNIFICANT CHANGE UP
EGFR: 21 ML/MIN/1.73M2 — LOW
GLUCOSE BLDC GLUCOMTR-MCNC: 129 MG/DL — HIGH (ref 70–99)
GLUCOSE BLDC GLUCOMTR-MCNC: 162 MG/DL — HIGH (ref 70–99)
GLUCOSE BLDC GLUCOMTR-MCNC: 176 MG/DL — HIGH (ref 70–99)
GLUCOSE BLDC GLUCOMTR-MCNC: 211 MG/DL — HIGH (ref 70–99)
GLUCOSE BLDC GLUCOMTR-MCNC: 283 MG/DL — HIGH (ref 70–99)
GLUCOSE BLDC GLUCOMTR-MCNC: 328 MG/DL — HIGH (ref 70–99)
GLUCOSE SERPL-MCNC: 302 MG/DL — HIGH (ref 70–99)
HCT VFR BLD CALC: 23.3 % — LOW (ref 34.5–45)
HGB BLD-MCNC: 7.4 G/DL — LOW (ref 11.5–15.5)
MAGNESIUM SERPL-MCNC: 1.8 MG/DL — SIGNIFICANT CHANGE UP (ref 1.6–2.6)
MCHC RBC-ENTMCNC: 26.1 PG — LOW (ref 27–34)
MCHC RBC-ENTMCNC: 31.8 GM/DL — LOW (ref 32–36)
MCV RBC AUTO: 82.3 FL — SIGNIFICANT CHANGE UP (ref 80–100)
NRBC # BLD: 0 /100 WBCS — SIGNIFICANT CHANGE UP (ref 0–0)
NRBC # FLD: 0 K/UL — SIGNIFICANT CHANGE UP (ref 0–0)
PHOSPHATE SERPL-MCNC: 1.8 MG/DL — LOW (ref 2.5–4.5)
PLATELET # BLD AUTO: 94 K/UL — LOW (ref 150–400)
POTASSIUM SERPL-MCNC: 3.8 MMOL/L — SIGNIFICANT CHANGE UP (ref 3.5–5.3)
POTASSIUM SERPL-SCNC: 3.8 MMOL/L — SIGNIFICANT CHANGE UP (ref 3.5–5.3)
RBC # BLD: 2.83 M/UL — LOW (ref 3.8–5.2)
RBC # FLD: 17.2 % — HIGH (ref 10.3–14.5)
SODIUM SERPL-SCNC: 136 MMOL/L — SIGNIFICANT CHANGE UP (ref 135–145)
SPECIMEN SOURCE: SIGNIFICANT CHANGE UP
WBC # BLD: 6.89 K/UL — SIGNIFICANT CHANGE UP (ref 3.8–10.5)
WBC # FLD AUTO: 6.89 K/UL — SIGNIFICANT CHANGE UP (ref 3.8–10.5)

## 2023-10-05 PROCEDURE — 76937 US GUIDE VASCULAR ACCESS: CPT | Mod: 26

## 2023-10-05 PROCEDURE — 36558 INSERT TUNNELED CV CATH: CPT

## 2023-10-05 PROCEDURE — 99232 SBSQ HOSP IP/OBS MODERATE 35: CPT

## 2023-10-05 PROCEDURE — 77001 FLUOROGUIDE FOR VEIN DEVICE: CPT | Mod: 26,GC

## 2023-10-05 RX ORDER — INSULIN LISPRO 100/ML
VIAL (ML) SUBCUTANEOUS
Refills: 0 | Status: DISCONTINUED | OUTPATIENT
Start: 2023-10-05 | End: 2023-10-06

## 2023-10-05 RX ORDER — INSULIN LISPRO 100/ML
VIAL (ML) SUBCUTANEOUS AT BEDTIME
Refills: 0 | Status: DISCONTINUED | OUTPATIENT
Start: 2023-10-05 | End: 2023-10-06

## 2023-10-05 RX ADMIN — Medication 4: at 17:14

## 2023-10-05 RX ADMIN — CHLORHEXIDINE GLUCONATE 1 APPLICATION(S): 213 SOLUTION TOPICAL at 13:32

## 2023-10-05 RX ADMIN — Medication 3: at 06:40

## 2023-10-05 RX ADMIN — Medication 60 MILLIGRAM(S): at 05:28

## 2023-10-05 RX ADMIN — Medication 125 MICROGRAM(S): at 04:23

## 2023-10-05 RX ADMIN — PANTOPRAZOLE SODIUM 40 MILLIGRAM(S): 20 TABLET, DELAYED RELEASE ORAL at 05:28

## 2023-10-05 RX ADMIN — Medication 1: at 13:31

## 2023-10-05 RX ADMIN — CHLORHEXIDINE GLUCONATE 1 APPLICATION(S): 213 SOLUTION TOPICAL at 05:28

## 2023-10-05 NOTE — PROGRESS NOTE ADULT - PROBLEM SELECTOR PLAN 4
Noted. Suspect i/s/o abx +/- infection. Diff incl HIT  --d/c abx  --Heparin discontinued  --Heme consulted given intermediate probability of HIT,  PF4 Ab negative-> less likely HIT  - thrombocytopenia likely in the setting of acute infection and drug induced (zosyn can cause cytopenia)   - will CTM

## 2023-10-05 NOTE — DISCHARGE NOTE NURSING/CASE MANAGEMENT/SOCIAL WORK - PATIENT PORTAL LINK FT
You can access the FollowMyHealth Patient Portal offered by [facility] by registering at the following website: [email or web address]. By joining [facility]’s FollowMyHealth portal, you will also be able to view your health information using other applications (apps) compatible with our system.

## 2023-10-05 NOTE — PROCEDURE NOTE - NSPROCNAME_GEN_A_CORE
Central Line Insertion
Midline Insertion
Interventional Radiology
Arterial Puncture/Cannulation
Central Line Insertion

## 2023-10-05 NOTE — PROGRESS NOTE ADULT - PROBLEM SELECTOR PLAN 1
--Likely multifactorial i/s/o acute on chronic HF +/- progressive renal failure +/- possible PNA. Lower suspicion for PE at this time  --TTE 08/23 w/ EF 55%, Mildly enlarged RV and Large pericardial effusion  --CT C/A/P w/ B/L GGO and mod B/L pleural effusions  --Pt oliguric. W/o sig improvement w/ Bumex gtt  --C/w HD for volume removal  --Dialysis dependent-  plan for tunneled cath placement by  IR today  - Blood cx negative 09/24 (1 bottle),  repeat blood cx also NGTD  --Appreciate HF/Renal input  --Rest of mgt as below

## 2023-10-05 NOTE — DISCHARGE NOTE NURSING/CASE MANAGEMENT/SOCIAL WORK - NSDCFUADDAPPT_GEN_ALL_CORE_FT
Dialysis appointment with [facility] [address] [place] [address] Phone: [phone] Fax: [phone]. Dialysis scheduled for Mon/wed/Fri at 7pm. Admission set for Monday 10/9 at 2:30pm.

## 2023-10-05 NOTE — DISCHARGE NOTE NURSING/CASE MANAGEMENT/SOCIAL WORK - NSDCCRNAME_GEN_ALL_CORE_FT
[facility] [address] [place] [address] Phone: [phone] Fax: [phone].  Dialysis scheduled for Mon/wed/Fri at 7pm. Admission set for Monday 10/9 at 2:30pm.

## 2023-10-05 NOTE — PROGRESS NOTE ADULT - SUBJECTIVE AND OBJECTIVE BOX
Patient is a 58y old  Female who presents with a chief complaint of progressive dyspnea (04 Oct 2023 10:15)      SUBJECTIVE / OVERNIGHT EVENTS:    MEDICATIONS  (STANDING):  chlorhexidine 2% Cloths 1 Application(s) Topical daily  chlorhexidine 2% Cloths 1 Application(s) Topical <User Schedule>  dextrose 5%. 1000 milliLiter(s) (100 mL/Hr) IV Continuous <Continuous>  dextrose 5%. 1000 milliLiter(s) (50 mL/Hr) IV Continuous <Continuous>  dextrose 50% Injectable 25 Gram(s) IV Push once  dextrose 50% Injectable 12.5 Gram(s) IV Push once  dextrose 50% Injectable 25 Gram(s) IV Push once  glucagon  Injectable 1 milliGRAM(s) IntraMuscular once  insulin lispro (ADMELOG) corrective regimen sliding scale   SubCutaneous every 6 hours  levothyroxine 125 MICROGram(s) Oral daily  NIFEdipine XL 60 milliGRAM(s) Oral daily  pantoprazole    Tablet 40 milliGRAM(s) Oral before breakfast  polyethylene glycol 3350 17 Gram(s) Oral daily  senna 2 Tablet(s) Oral at bedtime  torsemide 60 milliGRAM(s) Oral daily    MEDICATIONS  (PRN):  dextrose Oral Gel 15 Gram(s) Oral once PRN Blood Glucose LESS THAN 70 milliGRAM(s)/deciliter  melatonin 3 milliGRAM(s) Oral at bedtime PRN Insomnia  sodium chloride 0.9% Bolus. 100 milliLiter(s) IV Bolus every 5 minutes PRN SBP LESS THAN or EQUAL to 90 mmHg      Vital Signs Last 24 Hrs  T(C): 36.9 (05 Oct 2023 08:41), Max: 36.9 (04 Oct 2023 16:30)  T(F): 98.4 (05 Oct 2023 08:41), Max: 98.5 (04 Oct 2023 16:30)  HR: 79 (05 Oct 2023 08:41) (72 - 79)  BP: 131/58 (05 Oct 2023 08:41) (120/67 - 151/60)  BP(mean): --  RR: 16 (05 Oct 2023 08:41) (16 - 18)  SpO2: 97% (05 Oct 2023 08:41) (94% - 100%)    Parameters below as of 05 Oct 2023 08:41  Patient On (Oxygen Delivery Method): room air      CAPILLARY BLOOD GLUCOSE      POCT Blood Glucose.: 283 mg/dL (05 Oct 2023 06:27)  POCT Blood Glucose.: 380 mg/dL (04 Oct 2023 23:33)  POCT Blood Glucose.: 113 mg/dL (04 Oct 2023 17:10)  POCT Blood Glucose.: 126 mg/dL (04 Oct 2023 11:39)    I&O's Summary    04 Oct 2023 07:01  -  05 Oct 2023 07:00  --------------------------------------------------------  IN: 350 mL / OUT: 175 mL / NET: 175 mL        PHYSICAL EXAM:  GENERAL: NAD, well-developed  HEAD:  Atraumatic, Normocephalic  EYES: EOMI, PERRLA, conjunctiva and sclera clear  NECK: Supple, No JVD  CHEST/LUNG: Clear to auscultation bilaterally; No wheeze  HEART: Regular rate and rhythm; No murmurs, rubs, or gallops  ABDOMEN: Soft, Nontender, Nondistended; Bowel sounds present  EXTREMITIES:  2+ Peripheral Pulses, No clubbing, cyanosis, or edema  PSYCH: AAOx3  NEUROLOGY: non-focal  SKIN: No rashes or lesions    LABS:                        7.4    6.89  )-----------( 94       ( 05 Oct 2023 04:29 )             23.3     10-05    136  |  98  |  14  ----------------------------<  302<H>  3.8   |  25  |  2.60<H>    Ca    8.1<L>      05 Oct 2023 04:29  Phos  1.8     10-05  Mg     1.80     10-05    TPro  6.2  /  Alb  3.3  /  TBili  0.4  /  DBili  <0.2  /  AST  31  /  ALT  78<H>  /  AlkPhos  465<H>  10-04    PT/INR - ( 04 Oct 2023 06:30 )   PT: 11.6 sec;   INR: 1.04 ratio         PTT - ( 05 Oct 2023 04:29 )  PTT:35.9 sec      Urinalysis Basic - ( 05 Oct 2023 04:29 )    Color: x / Appearance: x / SG: x / pH: x  Gluc: 302 mg/dL / Ketone: x  / Bili: x / Urobili: x   Blood: x / Protein: x / Nitrite: x   Leuk Esterase: x / RBC: x / WBC x   Sq Epi: x / Non Sq Epi: x / Bacteria: x        RADIOLOGY & ADDITIONAL TESTS:    Imaging Personally Reviewed:    Consultant(s) Notes Reviewed:      Care Discussed with Consultants/Other Providers:   Patient is a 58y old  Female who presents with a chief complaint of progressive dyspnea (04 Oct 2023 10:15)      SUBJECTIVE / OVERNIGHT EVENTS: patient seen and examined by bedside, pt scheduled  for PermCath  today as procedure was postponed yesterday , pt denies headache, dizziness, SOB, CP, Palpitations , N/V/D, abdominal pain        MEDICATIONS  (STANDING):  chlorhexidine 2% Cloths 1 Application(s) Topical daily  chlorhexidine 2% Cloths 1 Application(s) Topical <User Schedule>  dextrose 5%. 1000 milliLiter(s) (100 mL/Hr) IV Continuous <Continuous>  dextrose 5%. 1000 milliLiter(s) (50 mL/Hr) IV Continuous <Continuous>  dextrose 50% Injectable 25 Gram(s) IV Push once  dextrose 50% Injectable 12.5 Gram(s) IV Push once  dextrose 50% Injectable 25 Gram(s) IV Push once  glucagon  Injectable 1 milliGRAM(s) IntraMuscular once  insulin lispro (ADMELOG) corrective regimen sliding scale   SubCutaneous every 6 hours  levothyroxine 125 MICROGram(s) Oral daily  NIFEdipine XL 60 milliGRAM(s) Oral daily  pantoprazole    Tablet 40 milliGRAM(s) Oral before breakfast  polyethylene glycol 3350 17 Gram(s) Oral daily  senna 2 Tablet(s) Oral at bedtime  torsemide 60 milliGRAM(s) Oral daily    MEDICATIONS  (PRN):  dextrose Oral Gel 15 Gram(s) Oral once PRN Blood Glucose LESS THAN 70 milliGRAM(s)/deciliter  melatonin 3 milliGRAM(s) Oral at bedtime PRN Insomnia  sodium chloride 0.9% Bolus. 100 milliLiter(s) IV Bolus every 5 minutes PRN SBP LESS THAN or EQUAL to 90 mmHg      Vital Signs Last 24 Hrs  T(C): 36.9 (05 Oct 2023 08:41), Max: 36.9 (04 Oct 2023 16:30)  T(F): 98.4 (05 Oct 2023 08:41), Max: 98.5 (04 Oct 2023 16:30)  HR: 79 (05 Oct 2023 08:41) (72 - 79)  BP: 131/58 (05 Oct 2023 08:41) (120/67 - 151/60)  BP(mean): --  RR: 16 (05 Oct 2023 08:41) (16 - 18)  SpO2: 97% (05 Oct 2023 08:41) (94% - 100%)    Parameters below as of 05 Oct 2023 08:41  Patient On (Oxygen Delivery Method): room air      CAPILLARY BLOOD GLUCOSE      POCT Blood Glucose.: 283 mg/dL (05 Oct 2023 06:27)  POCT Blood Glucose.: 380 mg/dL (04 Oct 2023 23:33)  POCT Blood Glucose.: 113 mg/dL (04 Oct 2023 17:10)  POCT Blood Glucose.: 126 mg/dL (04 Oct 2023 11:39)    I&O's Summary    04 Oct 2023 07:01  -  05 Oct 2023 07:00  --------------------------------------------------------  IN: 350 mL / OUT: 175 mL / NET: 175 mL        PHYSICAL EXAM:  GENERAL: NAD  HEENT:  Atraumatic, Normocephalic, EOMI, conjunctiva and sclera clear  NECK: Supple, R IJ Non tunneled cath (dressing c/d/i)  CHEST/LUNG: Clear to auscultation bilaterally; No wheeze, crackles or rhonchi   HEART: Regular rate and rhythm; Normal S1 S2, No murmurs, rubs, or gallops. s/p pericardial drain removal , site c/d/i w/o bleed  ABDOMEN: Soft, Nontender, Nondistended; Bowel sounds present  EXTREMITIES:  2+ Peripheral Pulses, No edema  PSYCH: AAOx3  NEUROLOGY: non-focal  SKIN: Warm and dry      LABS:                        7.4    6.89  )-----------( 94       ( 05 Oct 2023 04:29 )             23.3     10-05    136  |  98  |  14  ----------------------------<  302<H>  3.8   |  25  |  2.60<H>    Ca    8.1<L>      05 Oct 2023 04:29  Phos  1.8     10-05  Mg     1.80     10-05    TPro  6.2  /  Alb  3.3  /  TBili  0.4  /  DBili  <0.2  /  AST  31  /  ALT  78<H>  /  AlkPhos  465<H>  10-04    PT/INR - ( 04 Oct 2023 06:30 )   PT: 11.6 sec;   INR: 1.04 ratio         PTT - ( 05 Oct 2023 04:29 )  PTT:35.9 sec      Urinalysis Basic - ( 05 Oct 2023 04:29 )    Color: x / Appearance: x / SG: x / pH: x  Gluc: 302 mg/dL / Ketone: x  / Bili: x / Urobili: x   Blood: x / Protein: x / Nitrite: x   Leuk Esterase: x / RBC: x / WBC x   Sq Epi: x / Non Sq Epi: x / Bacteria: x        RADIOLOGY & ADDITIONAL TESTS:    Imaging Personally Reviewed:    Consultant(s) Notes Reviewed:      Care Discussed with Consultants/Other Providers:

## 2023-10-05 NOTE — PRE PROCEDURE NOTE - HISTORY OF PRESENT ILLNESS
Interventional Radiology  Pre-Procedure Note    This is a 58y  Female  presenting for tunneled HD catheter insertion    HPI:  Ms. 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 She reports this morning she had acute shortness of breath at rest with associated weakness. She reports having left sided chest tightness that worsened when laying flat and in the decubitus position. She reports SOB on exertion as well. She reports over last few days she has had non-productive cough, decrease PO intake, nausea this morning, and diarrhea (without blood) this morning. She reports not eating much over the last few days with decrease PO intake. She reports taking her bumex today (both AM and PM). She was placed on BIPAP in the ED. CXR was sig for the following: small bilateral pleural effusions with prominent interstitial markings bilaterally. Labs were sig for the following: BUN/Cr 110/3.92, Na 122 (last 133 in August), CO2 15, AG 20, -290, d-dimer 3509, WBC 9.19, Hb 8.9/26.7, (8.2/1245), MCV 75.4, RDW 14.6, neutrophil count 87.1%, alk phos 1093, pro-BNP 2092, LA 0.9, pCO2 36. She denies any abdominal pain, dysuria, urinary frequency, fever or chills.  (21 Sep 2023 05:13)      PAST MEDICAL & SURGICAL HISTORY:  HTN (hypertension)      HLD (hyperlipidemia)      DM (diabetes mellitus)      Hypothyroid      H/O pulmonary hypertension      CKD (chronic kidney disease)      (HFpEF) heart failure with preserved ejection fraction      S/P cataract surgery          Social History:     FAMILY HISTORY:  FH: stroke (Father)        Allergies: No Known Allergies      Current Medications: chlorhexidine 2% Cloths 1 Application(s) Topical daily  chlorhexidine 2% Cloths 1 Application(s) Topical <User Schedule>  dextrose 5%. 1000 milliLiter(s) IV Continuous <Continuous>  dextrose 5%. 1000 milliLiter(s) IV Continuous <Continuous>  dextrose 50% Injectable 12.5 Gram(s) IV Push once  dextrose 50% Injectable 25 Gram(s) IV Push once  dextrose 50% Injectable 25 Gram(s) IV Push once  dextrose Oral Gel 15 Gram(s) Oral once PRN  glucagon  Injectable 1 milliGRAM(s) IntraMuscular once  insulin lispro (ADMELOG) corrective regimen sliding scale   SubCutaneous every 6 hours  levothyroxine 125 MICROGram(s) Oral daily  melatonin 3 milliGRAM(s) Oral at bedtime PRN  NIFEdipine XL 60 milliGRAM(s) Oral daily  pantoprazole    Tablet 40 milliGRAM(s) Oral before breakfast  polyethylene glycol 3350 17 Gram(s) Oral daily  senna 2 Tablet(s) Oral at bedtime  sodium chloride 0.9% Bolus. 100 milliLiter(s) IV Bolus every 5 minutes PRN  torsemide 60 milliGRAM(s) Oral daily      Labs:                         7.4    6.89  )-----------( 94       ( 05 Oct 2023 04:29 )             23.3       10-05    136  |  98  |  14  ----------------------------<  302<H>  3.8   |  25  |  2.60<H>    Ca    8.1<L>      05 Oct 2023 04:29  Phos  1.8     10-05  Mg     1.80     10-05    TPro  6.2  /  Alb  3.3  /  TBili  0.4  /  DBili  <0.2  /  AST  31  /  ALT  78<H>  /  AlkPhos  465<H>  10-04      HCG:       Assessment/Plan:   This is a 58y Female  presents with ESRD on HD via right IJ non tunneled catheter  Patient presents to IR for tunneled HD catheter insertion.  Procedure/ risks/ benefits/ goals/ alternatives were explained. All questions answered. Informed content obtained from patient. Consent placed in chart.

## 2023-10-06 VITALS
OXYGEN SATURATION: 97 % | TEMPERATURE: 98 F | SYSTOLIC BLOOD PRESSURE: 133 MMHG | DIASTOLIC BLOOD PRESSURE: 52 MMHG | RESPIRATION RATE: 17 BRPM | HEART RATE: 74 BPM

## 2023-10-06 LAB
ANION GAP SERPL CALC-SCNC: 14 MMOL/L — SIGNIFICANT CHANGE UP (ref 7–14)
BASOPHILS # BLD AUTO: 0.02 K/UL — SIGNIFICANT CHANGE UP (ref 0–0.2)
BASOPHILS NFR BLD AUTO: 0.3 % — SIGNIFICANT CHANGE UP (ref 0–2)
BLD GP AB SCN SERPL QL: NEGATIVE — SIGNIFICANT CHANGE UP
BUN SERPL-MCNC: 25 MG/DL — HIGH (ref 7–23)
CALCIUM SERPL-MCNC: 8.7 MG/DL — SIGNIFICANT CHANGE UP (ref 8.4–10.5)
CHLORIDE SERPL-SCNC: 101 MMOL/L — SIGNIFICANT CHANGE UP (ref 98–107)
CO2 SERPL-SCNC: 24 MMOL/L — SIGNIFICANT CHANGE UP (ref 22–31)
CREAT SERPL-MCNC: 3 MG/DL — HIGH (ref 0.5–1.3)
CULTURE RESULTS: SIGNIFICANT CHANGE UP
EGFR: 18 ML/MIN/1.73M2 — LOW
EOSINOPHIL # BLD AUTO: 0.21 K/UL — SIGNIFICANT CHANGE UP (ref 0–0.5)
EOSINOPHIL NFR BLD AUTO: 3 % — SIGNIFICANT CHANGE UP (ref 0–6)
GLUCOSE BLDC GLUCOMTR-MCNC: 117 MG/DL — HIGH (ref 70–99)
GLUCOSE BLDC GLUCOMTR-MCNC: 226 MG/DL — HIGH (ref 70–99)
GLUCOSE BLDC GLUCOMTR-MCNC: 258 MG/DL — HIGH (ref 70–99)
GLUCOSE SERPL-MCNC: 213 MG/DL — HIGH (ref 70–99)
HCT VFR BLD CALC: 23 % — LOW (ref 34.5–45)
HGB BLD-MCNC: 7.2 G/DL — LOW (ref 11.5–15.5)
IANC: 5.28 K/UL — SIGNIFICANT CHANGE UP (ref 1.8–7.4)
IMM GRANULOCYTES NFR BLD AUTO: 0.4 % — SIGNIFICANT CHANGE UP (ref 0–0.9)
LYMPHOCYTES # BLD AUTO: 0.8 K/UL — LOW (ref 1–3.3)
LYMPHOCYTES # BLD AUTO: 11.6 % — LOW (ref 13–44)
MAGNESIUM SERPL-MCNC: 1.6 MG/DL — SIGNIFICANT CHANGE UP (ref 1.6–2.6)
MCHC RBC-ENTMCNC: 25.8 PG — LOW (ref 27–34)
MCHC RBC-ENTMCNC: 31.3 GM/DL — LOW (ref 32–36)
MCV RBC AUTO: 82.4 FL — SIGNIFICANT CHANGE UP (ref 80–100)
MONOCYTES # BLD AUTO: 0.55 K/UL — SIGNIFICANT CHANGE UP (ref 0–0.9)
MONOCYTES NFR BLD AUTO: 8 % — SIGNIFICANT CHANGE UP (ref 2–14)
NEUTROPHILS # BLD AUTO: 5.28 K/UL — SIGNIFICANT CHANGE UP (ref 1.8–7.4)
NEUTROPHILS NFR BLD AUTO: 76.7 % — SIGNIFICANT CHANGE UP (ref 43–77)
NRBC # BLD: 0 /100 WBCS — SIGNIFICANT CHANGE UP (ref 0–0)
NRBC # FLD: 0 K/UL — SIGNIFICANT CHANGE UP (ref 0–0)
PHOSPHATE SERPL-MCNC: 3 MG/DL — SIGNIFICANT CHANGE UP (ref 2.5–4.5)
PLATELET # BLD AUTO: 103 K/UL — LOW (ref 150–400)
POTASSIUM SERPL-MCNC: 4 MMOL/L — SIGNIFICANT CHANGE UP (ref 3.5–5.3)
POTASSIUM SERPL-SCNC: 4 MMOL/L — SIGNIFICANT CHANGE UP (ref 3.5–5.3)
RBC # BLD: 2.79 M/UL — LOW (ref 3.8–5.2)
RBC # FLD: 17.4 % — HIGH (ref 10.3–14.5)
RH IG SCN BLD-IMP: POSITIVE — SIGNIFICANT CHANGE UP
SODIUM SERPL-SCNC: 139 MMOL/L — SIGNIFICANT CHANGE UP (ref 135–145)
SPECIMEN SOURCE: SIGNIFICANT CHANGE UP
WBC # BLD: 6.89 K/UL — SIGNIFICANT CHANGE UP (ref 3.8–10.5)
WBC # FLD AUTO: 6.89 K/UL — SIGNIFICANT CHANGE UP (ref 3.8–10.5)

## 2023-10-06 PROCEDURE — 99239 HOSP IP/OBS DSCHRG MGMT >30: CPT

## 2023-10-06 RX ORDER — SENNA PLUS 8.6 MG/1
2 TABLET ORAL
Qty: 0 | Refills: 0 | DISCHARGE
Start: 2023-10-06

## 2023-10-06 RX ORDER — POLYETHYLENE GLYCOL 3350 17 G/17G
17 POWDER, FOR SOLUTION ORAL
Qty: 0 | Refills: 0 | DISCHARGE
Start: 2023-10-06

## 2023-10-06 RX ORDER — POLYETHYLENE GLYCOL 3350 17 G/17G
17 POWDER, FOR SOLUTION ORAL
Qty: 510 | Refills: 0
Start: 2023-10-06 | End: 2023-11-04

## 2023-10-06 RX ORDER — SENNA PLUS 8.6 MG/1
2 TABLET ORAL
Qty: 60 | Refills: 0
Start: 2023-10-06 | End: 2023-11-04

## 2023-10-06 RX ADMIN — CHLORHEXIDINE GLUCONATE 1 APPLICATION(S): 213 SOLUTION TOPICAL at 06:36

## 2023-10-06 RX ADMIN — Medication 125 MICROGRAM(S): at 05:15

## 2023-10-06 RX ADMIN — Medication 60 MILLIGRAM(S): at 12:25

## 2023-10-06 RX ADMIN — CHLORHEXIDINE GLUCONATE 1 APPLICATION(S): 213 SOLUTION TOPICAL at 12:46

## 2023-10-06 RX ADMIN — Medication 2: at 12:26

## 2023-10-06 NOTE — PROGRESS NOTE ADULT - PROBLEM SELECTOR PROBLEM 8
Hypothyroidism
Gastritis
Gastritis
HTN (hypertension)
Hypothyroidism
HTN (hypertension)
HTN (hypertension)

## 2023-10-06 NOTE — PROGRESS NOTE ADULT - SUBJECTIVE AND OBJECTIVE BOX
Patient is a 58y old  Female who presents with a chief complaint of progressive dyspnea (05 Oct 2023 10:01)      SUBJECTIVE / OVERNIGHT EVENTS:    MEDICATIONS  (STANDING):  chlorhexidine 2% Cloths 1 Application(s) Topical daily  chlorhexidine 2% Cloths 1 Application(s) Topical <User Schedule>  dextrose 5%. 1000 milliLiter(s) (50 mL/Hr) IV Continuous <Continuous>  dextrose 5%. 1000 milliLiter(s) (100 mL/Hr) IV Continuous <Continuous>  dextrose 50% Injectable 12.5 Gram(s) IV Push once  dextrose 50% Injectable 25 Gram(s) IV Push once  dextrose 50% Injectable 25 Gram(s) IV Push once  glucagon  Injectable 1 milliGRAM(s) IntraMuscular once  insulin lispro (ADMELOG) corrective regimen sliding scale   SubCutaneous three times a day before meals  insulin lispro (ADMELOG) corrective regimen sliding scale   SubCutaneous at bedtime  levothyroxine 125 MICROGram(s) Oral daily  NIFEdipine XL 60 milliGRAM(s) Oral daily  pantoprazole    Tablet 40 milliGRAM(s) Oral before breakfast  polyethylene glycol 3350 17 Gram(s) Oral daily  senna 2 Tablet(s) Oral at bedtime  torsemide 60 milliGRAM(s) Oral daily    MEDICATIONS  (PRN):  dextrose Oral Gel 15 Gram(s) Oral once PRN Blood Glucose LESS THAN 70 milliGRAM(s)/deciliter  melatonin 3 milliGRAM(s) Oral at bedtime PRN Insomnia  sodium chloride 0.9% Bolus. 100 milliLiter(s) IV Bolus every 5 minutes PRN SBP LESS THAN or EQUAL to 90 mmHg      Vital Signs Last 24 Hrs  T(C): 36.9 (06 Oct 2023 09:50), Max: 37 (06 Oct 2023 06:40)  T(F): 98.5 (06 Oct 2023 09:50), Max: 98.6 (06 Oct 2023 06:40)  HR: 72 (06 Oct 2023 09:50) (60 - 79)  BP: 138/70 (06 Oct 2023 09:50) (113/54 - 165/66)  BP(mean): --  RR: 16 (06 Oct 2023 09:50) (16 - 18)  SpO2: 100% (06 Oct 2023 09:50) (95% - 100%)    Parameters below as of 06 Oct 2023 09:50  Patient On (Oxygen Delivery Method): room air      CAPILLARY BLOOD GLUCOSE      POCT Blood Glucose.: 117 mg/dL (06 Oct 2023 09:16)  POCT Blood Glucose.: 258 mg/dL (06 Oct 2023 06:07)  POCT Blood Glucose.: 211 mg/dL (05 Oct 2023 21:32)  POCT Blood Glucose.: 328 mg/dL (05 Oct 2023 17:02)  POCT Blood Glucose.: 176 mg/dL (05 Oct 2023 13:30)  POCT Blood Glucose.: 162 mg/dL (05 Oct 2023 12:33)    I&O's Summary    05 Oct 2023 07:01  -  06 Oct 2023 07:00  --------------------------------------------------------  IN: 440 mL / OUT: 650 mL / NET: -210 mL    06 Oct 2023 07:01  -  06 Oct 2023 10:22  --------------------------------------------------------  IN: 400 mL / OUT: 3400 mL / NET: -3000 mL        PHYSICAL EXAM:  GENERAL: NAD, well-developed  HEAD:  Atraumatic, Normocephalic  EYES: EOMI, PERRLA, conjunctiva and sclera clear  NECK: Supple, No JVD  CHEST/LUNG: Clear to auscultation bilaterally; No wheeze  HEART: Regular rate and rhythm; No murmurs, rubs, or gallops  ABDOMEN: Soft, Nontender, Nondistended; Bowel sounds present  EXTREMITIES:  2+ Peripheral Pulses, No clubbing, cyanosis, or edema  PSYCH: AAOx3  NEUROLOGY: non-focal  SKIN: No rashes or lesions    LABS:                        7.2    6.89  )-----------( 103      ( 06 Oct 2023 06:45 )             23.0     10-06    139  |  101  |  25<H>  ----------------------------<  213<H>  4.0   |  24  |  3.00<H>    Ca    8.7      06 Oct 2023 06:45  Phos  3.0     10-06  Mg     1.60     10-06      PTT - ( 05 Oct 2023 04:29 )  PTT:35.9 sec      Urinalysis Basic - ( 06 Oct 2023 06:45 )    Color: x / Appearance: x / SG: x / pH: x  Gluc: 213 mg/dL / Ketone: x  / Bili: x / Urobili: x   Blood: x / Protein: x / Nitrite: x   Leuk Esterase: x / RBC: x / WBC x   Sq Epi: x / Non Sq Epi: x / Bacteria: x        RADIOLOGY & ADDITIONAL TESTS:    Imaging Personally Reviewed:    Consultant(s) Notes Reviewed:      Care Discussed with Consultants/Other Providers:   Patient is a 58y old  Female who presents with a chief complaint of progressive dyspnea (05 Oct 2023 10:01)      SUBJECTIVE / OVERNIGHT EVENTS: patient seen and examined by bedside pt s/p tunneled cath placement on 10/5/23, tolerated HD well today , , pt denies headache, dizziness, SOB, CP, Palpitations , N/V/D, abdominal pain at this time   Tele : NSR     MEDICATIONS  (STANDING):  chlorhexidine 2% Cloths 1 Application(s) Topical daily  chlorhexidine 2% Cloths 1 Application(s) Topical <User Schedule>  dextrose 5%. 1000 milliLiter(s) (50 mL/Hr) IV Continuous <Continuous>  dextrose 5%. 1000 milliLiter(s) (100 mL/Hr) IV Continuous <Continuous>  dextrose 50% Injectable 12.5 Gram(s) IV Push once  dextrose 50% Injectable 25 Gram(s) IV Push once  dextrose 50% Injectable 25 Gram(s) IV Push once  glucagon  Injectable 1 milliGRAM(s) IntraMuscular once  insulin lispro (ADMELOG) corrective regimen sliding scale   SubCutaneous three times a day before meals  insulin lispro (ADMELOG) corrective regimen sliding scale   SubCutaneous at bedtime  levothyroxine 125 MICROGram(s) Oral daily  NIFEdipine XL 60 milliGRAM(s) Oral daily  pantoprazole    Tablet 40 milliGRAM(s) Oral before breakfast  polyethylene glycol 3350 17 Gram(s) Oral daily  senna 2 Tablet(s) Oral at bedtime  torsemide 60 milliGRAM(s) Oral daily    MEDICATIONS  (PRN):  dextrose Oral Gel 15 Gram(s) Oral once PRN Blood Glucose LESS THAN 70 milliGRAM(s)/deciliter  melatonin 3 milliGRAM(s) Oral at bedtime PRN Insomnia  sodium chloride 0.9% Bolus. 100 milliLiter(s) IV Bolus every 5 minutes PRN SBP LESS THAN or EQUAL to 90 mmHg      Vital Signs Last 24 Hrs  T(C): 36.9 (06 Oct 2023 09:50), Max: 37 (06 Oct 2023 06:40)  T(F): 98.5 (06 Oct 2023 09:50), Max: 98.6 (06 Oct 2023 06:40)  HR: 72 (06 Oct 2023 09:50) (60 - 79)  BP: 138/70 (06 Oct 2023 09:50) (113/54 - 165/66)  BP(mean): --  RR: 16 (06 Oct 2023 09:50) (16 - 18)  SpO2: 100% (06 Oct 2023 09:50) (95% - 100%)    Parameters below as of 06 Oct 2023 09:50  Patient On (Oxygen Delivery Method): room air      CAPILLARY BLOOD GLUCOSE      POCT Blood Glucose.: 117 mg/dL (06 Oct 2023 09:16)  POCT Blood Glucose.: 258 mg/dL (06 Oct 2023 06:07)  POCT Blood Glucose.: 211 mg/dL (05 Oct 2023 21:32)  POCT Blood Glucose.: 328 mg/dL (05 Oct 2023 17:02)  POCT Blood Glucose.: 176 mg/dL (05 Oct 2023 13:30)  POCT Blood Glucose.: 162 mg/dL (05 Oct 2023 12:33)    I&O's Summary    05 Oct 2023 07:01  -  06 Oct 2023 07:00  --------------------------------------------------------  IN: 440 mL / OUT: 650 mL / NET: -210 mL    06 Oct 2023 07:01  -  06 Oct 2023 10:22  --------------------------------------------------------  IN: 400 mL / OUT: 3400 mL / NET: -3000 mL        PHYSICAL EXAM:  GENERAL: NAD  HEENT:  Atraumatic, Normocephalic, EOMI, conjunctiva and sclera clear  NECK: Supple, Rt  tunneled cath +  CHEST/LUNG: Clear to auscultation bilaterally; No wheeze, crackles or rhonchi   HEART: Regular rate and rhythm; Normal S1 S2, No murmurs, rubs, or gallops.   ABDOMEN: Soft, Nontender, Nondistended; Bowel sounds present  EXTREMITIES:  2+ Peripheral Pulses, No edema  PSYCH: AAOx3  NEUROLOGY: non-focal  SKIN: Warm and dry        LABS:                        7.2    6.89  )-----------( 103      ( 06 Oct 2023 06:45 )             23.0     10-06    139  |  101  |  25<H>  ----------------------------<  213<H>  4.0   |  24  |  3.00<H>    Ca    8.7      06 Oct 2023 06:45  Phos  3.0     10-06  Mg     1.60     10-06      PTT - ( 05 Oct 2023 04:29 )  PTT:35.9 sec      Urinalysis Basic - ( 06 Oct 2023 06:45 )    Color: x / Appearance: x / SG: x / pH: x  Gluc: 213 mg/dL / Ketone: x  / Bili: x / Urobili: x   Blood: x / Protein: x / Nitrite: x   Leuk Esterase: x / RBC: x / WBC x   Sq Epi: x / Non Sq Epi: x / Bacteria: x        RADIOLOGY & ADDITIONAL TESTS:    Imaging Personally Reviewed:    Consultant(s) Notes Reviewed:      Care Discussed with Consultants/Other Providers: Nephrology, Heart failure

## 2023-10-06 NOTE — PROGRESS NOTE ADULT - PROBLEM SELECTOR PROBLEM 7
Transaminitis
Normocytic anemia
Hypothyroidism
HTN (hypertension)
Transaminitis
Normocytic anemia
Transaminitis
Hypothyroidism
Transaminitis
Transaminitis
HTN (hypertension)
Transaminitis

## 2023-10-06 NOTE — PROGRESS NOTE ADULT - PROBLEM SELECTOR PROBLEM 3
Metabolic acidosis
Pneumonia
Acute renal failure superimposed on chronic kidney disease
Pneumonia
Metabolic acidosis
Metabolic acidosis
Pneumonia
Acute renal failure superimposed on chronic kidney disease
Pneumonia
Pneumonia

## 2023-10-06 NOTE — PROGRESS NOTE ADULT - PROBLEM SELECTOR PROBLEM 10
Gastritis
Goals of care, counseling/discussion
Gastritis
Elevated alkaline phosphatase level
Gastritis
Elevated alkaline phosphatase level
Gastritis
Diabetes mellitus type II, non insulin dependent
Diabetes mellitus type II, non insulin dependent
Gastritis
Goals of care, counseling/discussion
Gastritis

## 2023-10-06 NOTE — PROGRESS NOTE ADULT - NUTRITIONAL ASSESSMENT
This patient has been assessed with a concern for Malnutrition and has been determined to have a diagnosis/diagnoses of Moderate protein-calorie malnutrition.    This patient is being managed with:   Diet NPO after Midnight-     NPO Start Date: 04-Oct-2023   NPO Start Time: 23:59  Except Medications  Entered: Oct  4 2023 11:11PM    Diet Consistent Carbohydrate w/Evening Snack-  Entered: Sep 25 2023  3:55PM  
This patient has been assessed with a concern for Malnutrition and has been determined to have a diagnosis/diagnoses of Moderate protein-calorie malnutrition.    This patient is being managed with:   Diet Consistent Carbohydrate w/Evening Snack-  Entered: Sep 25 2023  3:55PM  
This patient has been assessed with a concern for Malnutrition and has been determined to have a diagnosis/diagnoses of Moderate protein-calorie malnutrition.    This patient is being managed with:   Diet NPO-  NPO for Procedure/Test     NPO Start Date: 03-Oct-2023   NPO Start Time: 23:59  Except Medications  Entered: Oct  2 2023  5:19PM    Diet Consistent Carbohydrate w/Evening Snack-  Entered: Sep 25 2023  3:55PM

## 2023-10-06 NOTE — PROGRESS NOTE ADULT - PROBLEM SELECTOR PLAN 8
pt noted to have elevated BP as was off her antihypertensives   resumed procardia xl 60 mg , also added Torsemide  --Monitor pressures, will adjust BP meds as needed pt noted to have elevated BP as was off her antihypertensives   resumed procardia xl 60 mg , also added Torsemide  - case d/w HF attending, agree with DC on current regimen, , outpt cardiology f/u

## 2023-10-06 NOTE — PROGRESS NOTE ADULT - PROBLEM SELECTOR PROBLEM 11
Need for prophylactic measure
Goals of care, counseling/discussion
Goals of care, counseling/discussion
Diabetes mellitus type II, non insulin dependent
Need for prophylactic measure
Diabetes mellitus type II, non insulin dependent

## 2023-10-06 NOTE — PROGRESS NOTE ADULT - PROBLEM SELECTOR PLAN 11
DVT prophylaxis: heparin 5000 units every 8 hours  Diet: Diabetes cardiac diet with restriction
--A1C 6  --C/w ISS  --Monitor fingersticks
--A1C 6  --C/w ISS  --Monitor fingersticks
DVT prophylaxis: heparin 5000 units every 8 hours  Diet: Diabetes cardiac diet with restriction  Dispo: home
DVT: HSQ  DIET: cc  DISPO: Pending hospital course, PT consulted
--A1C 6  --C/w ISS  --Monitor fingersticks
DVT: HSQ  DIET: cc  DISPO: Pending hospital course, PT consulted

## 2023-10-06 NOTE — PROGRESS NOTE ADULT - PROBLEM SELECTOR PLAN 2
--Pt previously w/ obstructive shock i/s/o pericardial effusion w/ tamponade.  --s/p pericardial drain placement and removal  --Now resolved  --Continue tele monitoring  --HF f/u appreciated, agree with increasing procardia to 60 mg daily, may need to be increased to BID if BP not controlled, HF also rec adding Torsemide 60 mg as pt still making urine   - will CTM --Pt previously w/ obstructive shock i/s/o pericardial effusion w/ tamponade.  --s/p pericardial drain placement and removal  --Now resolved  --Continue tele monitoring  --HF f/u appreciated, agree with increasing procardia to 60 mg daily, Torsemide 60 mg as pt still making urine   - d/w HF team, ok with DC home ,outpt f/u

## 2023-10-06 NOTE — PROGRESS NOTE ADULT - PROBLEM SELECTOR PLAN 9
--c/w synthroid  --Repeat TFTs in 4-6 weeks
-A1C in AM 6.3  in August   - Home regimen: repaglinide at home  - ISS here monitor FS ACHS
--c/w synthroid  --Repeat TFTs in 4-6 weeks
--c/w synthroid  --Repeat TFTs in 4-6 weeks
-A1C in AM 6.3  in August   - HOme regimen: repaglinide at home  - ISS here monitor FS ACHS
--c/w synthroid  --Repeat TFTs in 4-6 weeks
--c/w synthroid  --Repeat TFTs in 4-6 weeks
--c/w PPI
--c/w PPI
--A1C 6  --C/w ISS  --Monitor fingersticks
--A1C 6  --C/w ISS  --Monitor fingersticks
--c/w synthroid  --Repeat TFTs in 4-6 weeks

## 2023-10-06 NOTE — CHART NOTE - NSCHARTNOTESELECT_GEN_ALL_CORE
Event Note
IR preprocedure/Event Note
IR/Event Note
MAR accept/Event Note
MICU DOWNGRADE/Event Note
Nutrition Services
Nutrition Services
POCUS/Event Note
Event Note
Heme/onc
MICU ACCEPT/Event Note
POCUS/Event Note
POCUS/Event Note
pericardial drain/Event Note

## 2023-10-06 NOTE — PROGRESS NOTE ADULT - PROBLEM SELECTOR PROBLEM 9
Gastritis
Gastritis
Diabetes mellitus type II, non insulin dependent
Diabetes mellitus type II, non insulin dependent
Hypothyroidism
Diabetes mellitus type II, non insulin dependent
Diabetes mellitus type II, non insulin dependent
Hypothyroidism

## 2023-10-06 NOTE — PROGRESS NOTE ADULT - PROBLEM SELECTOR PLAN 6
--Likely 2/2 anemia of chronic disease + Acute blood loss anemia i/s/o recent bleed from pericardial drain site  --Anemia w/o c/w ACD  --Monitor Hgb  --Transfuse Hgb < 7, H/H currently stable --Likely 2/2 anemia of chronic disease + Acute blood loss anemia i/s/o recent bleed from pericardial drain site  --Anemia w/o c/w ACD  --Monitor Hgb  --Transfuse Hgb < 7,

## 2023-10-06 NOTE — PROGRESS NOTE ADULT - PROBLEM SELECTOR PROBLEM 4
Thrombocytopenia
Anemia
Diarrhea
Thrombocytopenia
Anemia
Thrombocytopenia
Pulmonary hypertension
Thrombocytopenia
Pulmonary hypertension
Diarrhea

## 2023-10-06 NOTE — PROGRESS NOTE ADULT - PROBLEM SELECTOR PROBLEM 5
Transaminitis
Transaminitis
Diarrhea
Diarrhea
Anemia
Diarrhea
Hypothyroidism
Diarrhea
Anemia
Hypothyroidism

## 2023-10-06 NOTE — PROGRESS NOTE ADULT - PROBLEM SELECTOR PLAN 1
--Likely multifactorial i/s/o acute on chronic HF +/- progressive renal failure +/- possible PNA. Lower suspicion for PE at this time  --TTE 08/23 w/ EF 55%, Mildly enlarged RV and Large pericardial effusion  --CT C/A/P w/ B/L GGO and mod B/L pleural effusions  --Pt oliguric. W/o sig improvement w/ Bumex gtt  --C/w HD for volume removal  --Dialysis dependent-  plan for tunneled cath placement by  IR today  - Blood cx negative 09/24 (1 bottle),  repeat blood cx also NGTD  --Appreciate HF/Renal input  --Rest of mgt as below --Likely multifactorial i/s/o acute on chronic HF +/- progressive renal failure +/- possible PNA. Lower suspicion for PE at this time  --TTE 08/23 w/ EF 55%, Mildly enlarged RV and Large pericardial effusion  --CT C/A/P w/ B/L GGO and mod B/L pleural effusions  --Pt oliguric. W/o sig improvement w/ Bumex gtt  --C/w HD for volume removal  --Dialysis dependent-  s/p tunneled cath placement by  IR on 10/5, tolerated HD well this morning   - Blood cx negative 09/24 (1 bottle),  repeat blood cx also NGTD  --Appreciate HF/Renal input

## 2023-10-06 NOTE — PROGRESS NOTE ADULT - PROBLEM SELECTOR PLAN 12
DVT: HSQ  DIET: cc  DISPO:  home Pending hospital course,   -PT now rec outpt PT   plan of care d/w pt and ACP DVT: HSQ  DIET: cc  DISPO:  DC home today   Patient hemodynamically stable for discharge home  Time spent in discharge process is 38 min    -PT now rec outpt PT   plan of care d/w pt and ACP

## 2023-10-06 NOTE — PROGRESS NOTE ADULT - PROBLEM SELECTOR PLAN 10
--c/w PPI
--c/w PPI
DVT: HSQ  DIET: cc  DISPO: Pending hospital course, PT consulted
--c/w PPI
CT abdomen suggestive of congestive hepatopathy   - continue with diuresis as above  - monitor LFTs
--c/w PPI
CT abdomen suggestive of congestive hepatopathy   - continue with diuresis as above  - monitor LFTs
--A1C 6  --C/w ISS  --Monitor fingersticks
--c/w PPI
--A1C 6  --C/w ISS  --Monitor fingersticks
--c/w PPI
DVT: HSQ  DIET: cc  DISPO: Pending hospital course, PT consulted

## 2023-10-06 NOTE — PROGRESS NOTE ADULT - REASON FOR ADMISSION
progressive dyspnea

## 2023-10-06 NOTE — PROGRESS NOTE ADULT - PROBLEM SELECTOR PLAN 4
Noted. Suspect i/s/o abx +/- infection. Diff incl HIT  --d/c abx  --Heparin discontinued  --Heme consulted given intermediate probability of HIT,  PF4 Ab negative-> less likely HIT  - thrombocytopenia likely in the setting of acute infection and drug induced (zosyn can cause cytopenia)   - will CTM Noted. Suspect i/s/o abx +/- infection. Diff incl HIT  --d/c abx  --Heparin discontinued  --Heme consulted given intermediate probability of HIT,  PF4 Ab negative-> less likely HIT  - thrombocytopenia likely in the setting of acute infection and drug induced (zosyn can cause cytopenia)   - platelets improved

## 2023-10-06 NOTE — PROGRESS NOTE ADULT - PROBLEM SELECTOR PROBLEM 6
Transaminitis
HTN (hypertension)
Anemia
Anemia
Transaminitis
Hyponatremia
Anemia
Anemia
HTN (hypertension)
Anemia
Hyponatremia
Anemia

## 2023-10-12 LAB — FIBRINOGEN AG PPP IA-MCNC: 532 MG/DL — HIGH (ref 233–496)

## 2023-10-13 ENCOUNTER — APPOINTMENT (OUTPATIENT)
Dept: HEART FAILURE | Facility: CLINIC | Age: 58
End: 2023-10-13

## 2023-10-20 ENCOUNTER — APPOINTMENT (OUTPATIENT)
Dept: NEPHROLOGY | Facility: CLINIC | Age: 58
End: 2023-10-20

## 2023-11-17 NOTE — PROGRESS NOTE ADULT - PROBLEM SELECTOR PLAN 7
11/17/2023        RE: Linda Loredo  [address]  [address]  [facility] [id number]        [facility] GERIATRICS    PRIMARY CARE PROVIDER AND CLINIC:  Ish Brown MD, [facility] [address] [facility] / [facility] [address]  Chief Complaint   Patient presents with     Hospital F/U      [facility] Medical Record Number:  [id number]  Place of Service where encounter took place:  [facility] (TCU)([facility]) [[id number]]    Linda Loredo  is a 79 year old  (1944), admitted to the above facility from  [facility] . Hospital stay 10/24/23 through 11/15/23..   HPI:    Linda Loredo is a 79 year old female admitted on 9/29/2023. She was transferred from [facility] for L heel osteomyelitis.  She underwent BKA on 10/7/23 and further debridement on 10/14/23. Long extensive rehab at [facility] until 11/15/23. Admitted to [facility] TCU for ongoing therapy and nursing needs at this time.     The primary encounter diagnosis was Physical deconditioning. Diagnoses of Generalized muscle weakness, Diabetic ulcer of left heel associated with type 2 diabetes mellitus, with muscle involvement without evidence of necrosis (H), Status post below-knee amputation of left lower extremity (H), Status post debridement, Osteomyelitis of left foot, unspecified type (H), Opioid dependence, uncomplicated (H), Chronic pain syndrome, Venous stasis ulcers of both lower extremities (H), Lymphedema, Type 2 diabetes mellitus with foot ulcer, with long-term current use of insulin (H), Obesity, Class III, BMI 40-49.9 (morbid obesity) (H), Slow transit constipation, Paroxysmal atrial fibrillation (H), Long term (current) use of anticoagulants, Warfarin-induced coagulopathy , Essential hypertension with goal blood pressure less than 140/90, Mixed hyperlipidemia, Stage 3 chronic kidney disease, unspecified whether stage 3a or 3b CKD (H), Chronic  --Noted. Suspect 2/2 ischemia i/s/o shock  --Liver unremarkable on CTA A/P  --Avoid hepatotoxic agents  --Trend LFTs right-sided heart failure (H), Failure to thrive in adult, Anxiety, Major depressive disorder, recurrent episode, moderate (H), Chronic obstructive pulmonary disease, unspecified COPD type (H), BERLIN (obstructive sleep apnea), Dermatitis, Skin lesion, Primary hyperparathyroidism (H24), and PICC (peripherally inserted central catheter) flush were also pertinent to this visit.    Met with patient who was found sitting upright in wheelchair today. She denies any chest pain, palpitations, shortness of breath, RASHID, lightheadedness, dizziness, or cough. Denies any abdominal discomfort. Denies N&V. Denies B&B concerns. Denies dysuria or frequency. Denies loose or constipation. Appetite good. Sleeping well. Reports pain stable at this time with current regimen. Reports not needing PRN dilaudid for over a week and wants this discontinued. PICC line to RUE in place trip-le lumen which all lumens there is no blood return. Staff to contact PICC team to come out to assess for blood return. Will start flush orders and dressing changes per need at this time. Open to recommendations below. Mood stable. No recent falls.     BP Readings from Last 3 Encounters:   11/17/23 128/85   11/15/23 (!) 149/80   10/24/23 130/70     Wt Readings from Last 5 Encounters:   11/17/23 104.3 kg (230 lb)   11/12/23 104.4 kg (230 lb 3.2 oz)   10/24/23 111.2 kg (245 lb 2.4 oz)   09/26/23 120.2 kg (265 lb)   06/22/23 111.4 kg (245 lb 8 oz)     CODE STATUS/ADVANCE DIRECTIVES DISCUSSION:  Full Code  CPR/Full code   ALLERGIES:   Allergies   Allergen Reactions     Prednisone Nausea and Vomiting     Morphine Nausea and Vomiting     Morphine [Fumaric Acid] Nausea and Vomiting      PAST MEDICAL HISTORY:   Past Medical History:   Diagnosis Date     Depressive disorder, not elsewhere classified      Herpes zoster without mention of complication      Hypercalcemia 2/24/2007     Mild intermittent asthma      Other urinary incontinence      Type II or unspecified type  diabetes mellitus with renal manifestations, uncontrolled(250.42) (H)      Type II or unspecified type diabetes mellitus without mention of complication, not stated as uncontrolled      Unspecified essential hypertension       PAST SURGICAL HISTORY:   has a past surgical history that includes Cholecystectomy; ligation of fallopian tube; Open reduction internal fixation ankle (10/13/11); pinning of left 2nd toe; Incision and drainage foot, combined (Left, 9/26/2023); IR Lower Extremity Angiogram Left (10/3/2023); Amputate leg below knee (Left, 10/7/2023); and Amputate leg below knee (Left, 10/14/2023).  FAMILY HISTORY: family history includes Cancer in her maternal grandmother and paternal grandmother; Diabetes in her sister; Heart Disease in her father; Hypertension in her mother and sister; Psychotic Disorder in her sister; Respiratory in her sister.  SOCIAL HISTORY:   reports that she has never smoked. She has never used smokeless tobacco. She reports that she does not drink alcohol and does not use drugs.  Patient's living condition: lives alone    Post Discharge Medication Reconciliation Status:   MED REC REQUIRED  Post Medication Reconciliation Status:  Discharge medications reconciled and changed, see notes/orders       Current Outpatient Medications   Medication Sig     methadone (DOLOPHINE) 10 MG tablet Take 1 tablet (10 mg) by mouth daily Daily at 1500     methadone (DOLOPHINE) 5 MG tablet Take 1 tablet (5 mg) by mouth 3 times daily Give 0800/1200 and 2100     mupirocin (BACTROBAN) 2 % external ointment Apply topically 2 times daily Apply to RLE lesion BID     sodium chloride, PF, (SODIUM CHLORIDE FLUSH) 0.9% PF flush Inject 10 mLs into the vein every 12 hours Flush each PICC lumen with 10mL 0.9% sodium chloride daily and PRN before and after use.     torsemide (DEMADEX) 5 MG tablet Take 3 tablets (15 mg) by mouth daily HOLD if SBP<100     Warfarin Therapy Reminder Per INR     acetaminophen (TYLENOL) 325 MG  "tablet Take 2 tablets (650 mg) by mouth every 6 hours as needed for mild pain or other (and adjunct with moderate or severe pain or per patient request)     aspirin 81 MG EC tablet Take 1 tablet (81 mg) by mouth daily     atorvastatin (LIPITOR) 40 MG tablet Take 1 tablet (40 mg) by mouth every evening     bisacodyl (DULCOLAX) 10 MG suppository Place 1 suppository (10 mg) rectally daily as needed for constipation     hydrocortisone 2.5 % cream Apply topically 2 times daily     insulin aspart (NOVOLOG PEN) 100 UNIT/ML pen Inject 1-7 Units Subcutaneous 3 times daily (before meals)     insulin aspart (NOVOLOG PEN) 100 UNIT/ML pen Inject 1-5 Units Subcutaneous at bedtime     insulin degludec (TRESIBA) 200 UNIT/ML pen Inject 12 Units Subcutaneous at bedtime Around 11-11:30 pm     miconazole (MICATIN) 2 % external powder Apply topically 2 times daily     multivitamin w/minerals (THERA-VIT-M) tablet Take 1 tablet by mouth daily     ondansetron (ZOFRAN ODT) 4 MG ODT tab Take 1 tablet (4 mg) by mouth every 6 hours as needed for nausea or vomiting     polyethylene glycol (MIRALAX) 17 g packet Take 17 g by mouth 2 times daily     senna-docusate (SENOKOT-S/PERICOLACE) 8.6-50 MG tablet Take 4 tablets by mouth 2 times daily for 30 days     vitamin C (ASCORBIC ACID) 500 MG tablet Take 500 mg by mouth daily     No current facility-administered medications for this visit.       ROS:  10 point ROS of systems including Constitutional, Eyes, Respiratory, Cardiovascular, Gastroenterology, Genitourinary, Integumentary, Musculoskeletal, Psychiatric were all negative except for pertinent positives noted in my HPI.    Vitals:  /85   Pulse 87   Temp 98.5  F (36.9  C)   Resp 20   Ht 1.676 m (5' 6\")   Wt 104.3 kg (230 lb)   SpO2 98%   BMI 37.12 kg/m    Exam:  GENERAL APPEARANCE:  Alert, in no distress, oriented, morbidly obese, cooperative  ENT:  Mouth and posterior oropharynx normal, moist mucous membranes, normal hearing " acuity  EYES:  EOM, conjunctivae, lids, pupils and irises normal  RESP:  respiratory effort and palpation of chest normal, lungs clear to auscultation , no respiratory distress  CV:  Palpation and auscultation of heart done , regular rate and rhythm, no murmur, rub, or gallop  ABDOMEN:  normal bowel sounds, soft, nontender, no hepatosplenomegaly or other masses, no guarding or rebound  M/S:   Slide board transfers. Wheelchair bound. Staff to assist for all ADLs, transfers and cares  SKIN:  Inspection of skin and subcutaneous tissue baseline, wound healing well, no signs of infection did not observe wound to left stump today as dressing is C/D/i  NEURO:   Cranial nerves 2-12 are normal tested and grossly at patient's baseline, no purposeful movement in upper and lower extremities  PSYCH:  oriented X 3, normal insight, judgement and memory, affect and mood normal    Lab/Diagnostic data:  Most Recent 3 CBC's:  Recent Labs   Lab Test 11/17/23  1033 11/01/23  0613 10/21/23  0637   WBC 7.8 6.4 7.3   HGB 10.5* 9.0* 8.3*   MCV 95 96 98    268 234     Most Recent 3 BMP's:  Recent Labs   Lab Test 11/17/23  1033 11/15/23  0800 11/14/23  2046 11/01/23  0814 11/01/23  0613 10/21/23  0805 10/21/23  0637     --   --   --  140  --  142   POTASSIUM 4.3  --   --   --  4.4  --  4.2   CHLORIDE 101  --   --   --  100  --  102   CO2 29  --   --   --  29  --  32*   BUN 27.9*  --   --   --  18.0  --  20.3   CR 0.93  --   --   --  0.96*  --  1.02*   ANIONGAP 11  --   --   --  11  --  8   ZAKIA 10.9*  --   --   --  9.8  --  9.9   * 141* 224*   < > 149*   < > 158*    < > = values in this interval not displayed.     Most Recent 2 LFT's:  Recent Labs   Lab Test 11/17/23  1033 11/01/23  0613   AST 20 19   ALT 11 12   ALKPHOS 90 97   BILITOTAL 0.5 0.2     Most Recent 3 INR's:  Recent Labs   Lab Test 11/17/23  1033 11/15/23  0644 11/14/23  0606   INR 1.69* 2.45* 2.26*     Most Recent INR's and Anticoagulation Dosing  History:  Anticoagulation Dose History  More data exists         Latest Ref Rng & Units 11/9/2023 11/10/2023 11/11/2023 11/12/2023 11/13/2023 11/14/2023 11/15/2023   Recent Dosing and Labs   warfarin ANTICOAGULANT (COUMADIN) tablet 2 mg - 2 mg, $Given 2 mg, $Given - - - 2 mg, $Given -   warfarin ANTICOAGULANT (COUMADIN) tablet 3 mg - - - 3 mg, $Given - - - -   warfarin ANTICOAGULANT (COUMADIN) tablet 4 mg - - - - 4 mg, $Given 4 mg, $Given - -   INR 0.85 - 1.15 2.01  1.84  1.80  1.79  1.93  2.26  2.45      Most Recent Cholesterol Panel:  Recent Labs   Lab Test 09/30/23  1620   CHOL 82   LDL 37   HDL 35*   TRIG 49     Most Recent Hemoglobin A1c:  Recent Labs   Lab Test 09/23/23  0533   A1C 7.0*     Most Recent Urinalysis:  Recent Labs   Lab Test 09/23/23  1302   COLOR Yellow   APPEARANCE Cloudy*   URINEGLC Negative   URINEBILI Negative   URINEKETONE Negative   SG 1.015   UBLD Large*   URINEPH 5.0   PROTEIN 30*   NITRITE Negative   LEUKEST Large*   RBCU 146*   WBCU >182*     Most Recent Anemia Panel:  Recent Labs   Lab Test 11/17/23  1033   WBC 7.8   HGB 10.5*   HCT 34.4*   MCV 95          ASSESSMENT/PLAN:    (R53.81) Physical deconditioning  (primary encounter diagnosis)  (M62.81) Generalized muscle weakness  Comment: Acute on chronic. S/T below diagnosis  Plan:   -Continue Physical therapy and Occupational therapy as directed  -SW to remain involved for safe discharge planning needs    (E11.621,  L97.425) Diabetic ulcer of left heel associated with type 2 diabetes mellitus, with muscle involvement without evidence of necrosis (H)  (Z89.512) Status post below-knee amputation of left lower extremity (H)  (Z98.890) Status post debridement  (M86.9) Osteomyelitis of left foot, unspecified type (H)  (F11.20) Opioid dependence, uncomplicated (H)  (G89.4) Chronic pain syndrome  (I83.019,  I83.029,  L97.919,  L97.929) Venous stasis ulcers of both lower extremities (H)  (I89.0) Lymphedema  Comment: Chronic. S/p  debridement 9/26/23. S/p Left saritha BKA on 10/7/23. S/p tibial/fibular resection and debridement of stump 10/17/23. Followed by vascular. Completed course of meropenem/vancomycin 10/8 for osteomyelitis. Vascular surgery planning to close in the future, monthly clinic visits for wound checks with closure at unspecified time in the future  Plan:   -Monitor pain complaints  -In house wound provider to follow for ongoing needs  -Discontinue PRN dilaudid due to non use.   -Continue methadone 10mg daily at 1500 and 5mg TID scheduled.   -Tylenol PRN  -Continue triple lumen PICC to RUE as directed. Added daily flush and PRN along with dressing weekly changes. Staff to contact PICC team today to come out to assess PICC and to assist with PICC de-clogging needs  -CMP, CBC, and INR due today 11/17/23  -BMP, CBC, INR, TSH, and free T4 due 11/24/23  -Follow up with vascular as directed. Scheduled for 12/11/23 and 1/15/24  -Continue current wound cares as directed;  *Wound Care - Left BKA : *Change daily* 1. Cleanse w/Vashe soaked gauze, including sreekanth wound skin, gentle pat dry. 2. Apply skin prep to sreekanth wound skin. 3. Soak Hydrofera Blue with saline and place over wound bed, cover with abd, and wrap with kerlix to secure.    (E11.621,  L97.509,  Z79.4) Type 2 diabetes mellitus with foot ulcer, with long-term current use of insulin (H)  (E66.01) Obesity, Class III, BMI 40-49.9 (morbid obesity) (H)  Comment: Chronic. Last A1c 7% in sept 2023. Goal <9%.   Plan:   -Monitor Blood Glucose QID as directed  -Continue sliding scale insulin as directed TID with meals and HS  -Continue tresiba 12 units at HS  -Recommend hgb A1c due Dec 2023.   -CMP, CBC, and INR due today 11/17/23  -BMP, CBC, INR, TSH, and free T4 due 11/24/23    (K59.01) Slow transit constipation  Comment: Acute on chronic. Improving with regimen below.   Plan:   -Monitor BM patterns  -Miralax BID  -Senna S 4 tabs BID  -Zofran PRN  -Bisacodyl daily PRN  -CMP, CBC,  and INR due today 11/17/23  -BMP, CBC, INR, TSH, and free T4 due 11/24/23    (I48.0) Paroxysmal atrial fibrillation (H)  (Z79.01) Long term (current) use of anticoagulants  (D68.32,  T45.515A) Warfarin-induced coagulopathy   (I10) Essential hypertension with goal blood pressure less than 140/90  (E78.2) Mixed hyperlipidemia  (N18.30) Stage 3 chronic kidney disease, unspecified whether stage 3a or 3b CKD (H)  (I50.812) Chronic right-sided heart failure (H)  Comment: Chronic. Baseline Creatinine~ 1-1.2. Based on JNC-8 goals,  patients age of 79 year old, presence of diabetes or CKD, and goals of care goal BP is  <140/90 mm Hg. Patient is stable with current plan of care and routine assessment..Holding lisinopril per chart review per patient wishes. INR goal 2-3. Last INR 2.45 on 11/15/23. INR today 11/17/23-1.69  Plan:   -Monitor for worsening s/symptoms of concerns  -Monitor BP and HR as directed  -Continue asa 81mg daily  -Continue atorvastatin 40mg daily  -Change coumadin to 2.5mg on mon/thurs and 3mg AOD  -Continue Torsemide 15mg daily. HOLD if SBP<100  -CMP, CBC, and INR due today 11/17/23  -BMP, CBC, INR, TSH, and free T4 due 11/24/23    (R62.7) Failure to thrive in adult  (F41.9) Anxiety  (F33.1) Major depressive disorder, recurrent episode, moderate (H)  Comment: Chronic. S/T current health condition  Plan:   -Monitor mood and behaviors  -Monitor for worsening s/symptoms of concerns  -Dietician to remain involved  -ACP offered while on TCU  -Monitor for changes in mobility, eating and sleeping patterns  -CMP, CBC, and INR due today 11/17/23  -BMP, CBC, INR, TSH, and free T4 due 11/24/23    (J44.9) Chronic obstructive pulmonary disease, unspecified COPD type (H)  (G47.33) BERLIN (obstructive sleep apnea)  Comment: Chronic. Non compliant with CPAP use.   Plan:   -Monitor respiratory status  -Would recommend CPAP restart if compliant  -CMP, CBC, and INR due today 11/17/23  -BMP, CBC, INR, TSH, and free T4 due  11/24/23    (L30.9) Dermatitis  Comment: Acute on chronic. History of allergic reaction to bilateral hands per chart review.   Plan:   -Monitor for worsening s/symptoms of concerns  -Hydrocortisone 2.5% topically BID  -CMP, CBC, and INR due today 11/17/23  -BMP, CBC, INR, TSH, and free T4 due 11/24/23    (L98.9) Skin lesion  Comment: Acute on chronic. Noted to RLE per chart review.   Plan:   -In house provider to continue to follow  -Monitor for worsening s/symptoms of concerns  -Bactroban cream to areas BID per orders  -CMP, CBC, and INR due today 11/17/23  -BMP, CBC, INR, TSH, and free T4 due 11/24/23    (E21.0) Primary hyperparathyroidism (H24)  Comment: Acute. Noted on chart review. Last TSH level in 09/2022 was 1.73.   Plan:   -Not currently on any supplementation.   -Monitor for worsening s/symptoms of concerns  --CMP, CBC, and INR due today 11/17/23  -BMP, CBC, INR, TSH, and free T4 due 11/24/23      Electronically signed by:  Dr. Haylie Rowe DNP, APRN, FNP-C, WCS-C, EDS-C                     Sincerely,        Haylie Rowe, MOE CNP

## 2023-11-21 ENCOUNTER — APPOINTMENT (OUTPATIENT)
Dept: CARDIOLOGY | Facility: CLINIC | Age: 58
End: 2023-11-21

## 2023-11-28 ENCOUNTER — APPOINTMENT (OUTPATIENT)
Dept: PULMONOLOGY | Facility: CLINIC | Age: 58
End: 2023-11-28

## 2024-01-02 ENCOUNTER — APPOINTMENT (OUTPATIENT)
Dept: ENDOCRINOLOGY | Facility: CLINIC | Age: 59
End: 2024-01-02

## 2024-01-11 DIAGNOSIS — E83.39 OTHER DISORDERS OF PHOSPHORUS METABOLISM: ICD-10-CM

## 2024-01-11 RX ORDER — CALCIUM ACETATE 667 MG/1
667 CAPSULE ORAL 3 TIMES DAILY
Qty: 270 | Refills: 2 | Status: ACTIVE | COMMUNITY
Start: 2024-01-11 | End: 1900-01-01

## 2024-02-28 NOTE — ED ADULT NURSE NOTE - IN THE PAST 12 MONTHS HAVE YOU USED DRUGS OTHER THAN THOSE REQUIRED FOR MEDICAL REASON?
CC:  Lius Mack is here today for Office Visit and Pre-Op Exam (Dr. Blount 3/12/24 prostatectomy with pelvic lymph node dissection )    Medications: medications verified, no change  Added preferred pharmacy  Refills needed today?  NO    There are no preventive care reminders to display for this patient.  Patient is up to date, no discussion needed.  Depression screening done                          No

## 2024-03-13 ENCOUNTER — APPOINTMENT (OUTPATIENT)
Dept: VASCULAR SURGERY | Facility: CLINIC | Age: 59
End: 2024-03-13
Payer: COMMERCIAL

## 2024-03-13 VITALS
WEIGHT: 126 LBS | HEIGHT: 62 IN | DIASTOLIC BLOOD PRESSURE: 51 MMHG | BODY MASS INDEX: 23.19 KG/M2 | HEART RATE: 48 BPM | SYSTOLIC BLOOD PRESSURE: 120 MMHG

## 2024-03-13 PROCEDURE — 93990 DOPPLER FLOW TESTING: CPT

## 2024-03-13 PROCEDURE — 99204 OFFICE O/P NEW MOD 45 MIN: CPT

## 2024-03-15 NOTE — HISTORY OF PRESENT ILLNESS
[FreeTextEntry1] : Patient is a 58 year old female presenting for long term HD access creation evaluation. Patient has ESRD requiring HD. Current access is a tunneled catheter which has been in place for the past 5 months. She is right-handed. Denies having pacemakers or ICDs. Denies trauma or procedures to bilateral upper extremities.

## 2024-03-15 NOTE — DATA REVIEWED
[FreeTextEntry1] : Bilateral upper extremity vein mapping obtained in the office today demonstrating marginal cephalic vein which could possibly be used for fistula creation.

## 2024-03-15 NOTE — ASSESSMENT
[FreeTextEntry1] : 58-year-old female with ESRD requiring HD. Current access is a tunneled catheter which has been in place for 5 months. Presents for evaluation for long term HD access creation.  Bilateral upper extremity vein mapping obtained in the office today demonstrating marginal cephalic vein which could possibly be used for fistula creation.  Plan: - Protect LUE - no IVs, blood draws, etc - Will schedule for LUE AVF creation (radiocephalic vs brachiocephalic). Risks (bleeding, infection, damage to surrounding structures, failure to mature, need for additional procedures), benefits (catheter-free dialysis), and alternative (continued HD via catheter) discussed with patient and son. They expressed understanding and are agreeable to proceeding with the procedure. - Obtain cardiac risk stratification for surgery

## 2024-03-15 NOTE — PHYSICAL EXAM
[2+] : right 2+ [de-identified] : NAD [de-identified] : Nonlabored breathing on room air [de-identified] : Regular rate upon evaluation in office room [FreeTextEntry1] : Bilateral radial pulses are palpable

## 2024-03-21 ENCOUNTER — OUTPATIENT (OUTPATIENT)
Dept: OUTPATIENT SERVICES | Facility: HOSPITAL | Age: 59
LOS: 1 days | End: 2024-03-21

## 2024-03-21 VITALS
SYSTOLIC BLOOD PRESSURE: 144 MMHG | RESPIRATION RATE: 16 BRPM | HEART RATE: 56 BPM | HEIGHT: 60 IN | TEMPERATURE: 97 F | WEIGHT: 121.92 LBS | DIASTOLIC BLOOD PRESSURE: 70 MMHG | OXYGEN SATURATION: 98 %

## 2024-03-21 DIAGNOSIS — Z98.49 CATARACT EXTRACTION STATUS, UNSPECIFIED EYE: Chronic | ICD-10-CM

## 2024-03-21 DIAGNOSIS — N18.6 END STAGE RENAL DISEASE: ICD-10-CM

## 2024-03-21 DIAGNOSIS — E11.9 TYPE 2 DIABETES MELLITUS WITHOUT COMPLICATIONS: ICD-10-CM

## 2024-03-21 DIAGNOSIS — Z86.79 PERSONAL HISTORY OF OTHER DISEASES OF THE CIRCULATORY SYSTEM: ICD-10-CM

## 2024-03-21 DIAGNOSIS — I10 ESSENTIAL (PRIMARY) HYPERTENSION: ICD-10-CM

## 2024-03-21 LAB
A1C WITH ESTIMATED AVERAGE GLUCOSE RESULT: 6.4 % — HIGH (ref 4–5.6)
ALBUMIN SERPL ELPH-MCNC: 4.2 G/DL — SIGNIFICANT CHANGE UP (ref 3.3–5)
ALP SERPL-CCNC: 413 U/L — HIGH (ref 40–120)
ALT FLD-CCNC: 23 U/L — SIGNIFICANT CHANGE UP (ref 4–33)
ANION GAP SERPL CALC-SCNC: 15 MMOL/L — HIGH (ref 7–14)
AST SERPL-CCNC: 27 U/L — SIGNIFICANT CHANGE UP (ref 4–32)
BILIRUB SERPL-MCNC: 0.5 MG/DL — SIGNIFICANT CHANGE UP (ref 0.2–1.2)
BUN SERPL-MCNC: 28 MG/DL — HIGH (ref 7–23)
CALCIUM SERPL-MCNC: 8.9 MG/DL — SIGNIFICANT CHANGE UP (ref 8.4–10.5)
CHLORIDE SERPL-SCNC: 99 MMOL/L — SIGNIFICANT CHANGE UP (ref 98–107)
CO2 SERPL-SCNC: 24 MMOL/L — SIGNIFICANT CHANGE UP (ref 22–31)
CREAT SERPL-MCNC: 2.15 MG/DL — HIGH (ref 0.5–1.3)
EGFR: 26 ML/MIN/1.73M2 — LOW
ESTIMATED AVERAGE GLUCOSE: 137 — SIGNIFICANT CHANGE UP
GLUCOSE SERPL-MCNC: 429 MG/DL — HIGH (ref 70–99)
HCT VFR BLD CALC: 34.7 % — SIGNIFICANT CHANGE UP (ref 34.5–45)
HGB BLD-MCNC: 10 G/DL — LOW (ref 11.5–15.5)
MCHC RBC-ENTMCNC: 23.5 PG — LOW (ref 27–34)
MCHC RBC-ENTMCNC: 28.8 GM/DL — LOW (ref 32–36)
MCV RBC AUTO: 81.6 FL — SIGNIFICANT CHANGE UP (ref 80–100)
NRBC # BLD: 0 /100 WBCS — SIGNIFICANT CHANGE UP (ref 0–0)
NRBC # FLD: 0 K/UL — SIGNIFICANT CHANGE UP (ref 0–0)
PLATELET # BLD AUTO: 121 K/UL — LOW (ref 150–400)
POTASSIUM SERPL-MCNC: 5 MMOL/L — SIGNIFICANT CHANGE UP (ref 3.5–5.3)
POTASSIUM SERPL-SCNC: 5 MMOL/L — SIGNIFICANT CHANGE UP (ref 3.5–5.3)
PROT SERPL-MCNC: 7.8 G/DL — SIGNIFICANT CHANGE UP (ref 6–8.3)
RBC # BLD: 4.25 M/UL — SIGNIFICANT CHANGE UP (ref 3.8–5.2)
RBC # FLD: 20.2 % — HIGH (ref 10.3–14.5)
SODIUM SERPL-SCNC: 138 MMOL/L — SIGNIFICANT CHANGE UP (ref 135–145)
WBC # BLD: 5.7 K/UL — SIGNIFICANT CHANGE UP (ref 3.8–10.5)
WBC # FLD AUTO: 5.7 K/UL — SIGNIFICANT CHANGE UP (ref 3.8–10.5)

## 2024-03-21 RX ORDER — SODIUM CHLORIDE 9 MG/ML
1000 INJECTION INTRAMUSCULAR; INTRAVENOUS; SUBCUTANEOUS
Refills: 0 | Status: DISCONTINUED | OUTPATIENT
Start: 2024-04-02 | End: 2024-04-16

## 2024-03-21 NOTE — H&P PST ADULT - PROBLEM SELECTOR PLAN 3
Patient instructed not to take repaglinide  on the morning of procedure. Patient instructed not to take repaglinide  on the morning of procedure.  Check fingerstick DOS Burow's Graft Text: The defect edges were debeveled with a #15 scalpel blade.  Given the location of the defect, shape of the defect, the proximity to free margins and the presence of a standing cone deformity a Burow's skin graft was deemed most appropriate. The standing cone was removed and this tissue was then trimmed to the shape of the primary defect. The adipose tissue was also removed until only dermis and epidermis were left.  The skin margins of the secondary defect were undermined to an appropriate distance in all directions utilizing iris scissors.  The secondary defect was closed with interrupted buried subcutaneous sutures.  The skin edges were then re-apposed with running  sutures.  The skin graft was then placed in the primary defect and oriented appropriately.

## 2024-03-21 NOTE — H&P PST ADULT - LAST ECHOCARDIOGRAM
08/25/23  Left ventricular systolic function is normal with an ejection fraction visually estimated at 60 to 65 %. 09/25/23  Left ventricular systolic function is normal with an ejection fraction visually estimated at 60 to 65 %.trace pericardial effusion ,  9/24/23 TTE revealed large pericardial effusion, underwent pericardiocentesis with sanguinous output; drain removed on 9/28 .

## 2024-03-21 NOTE — H&P PST ADULT - LAST CARDIAC ANGIOGRAM/IMAGING
- s/p RHC 8/28, elevated PCWP improved with diuresis s/p RHC 8/28/23, elevated PCWP improved with diuresis

## 2024-03-21 NOTE — H&P PST ADULT - PROBLEM SELECTOR PLAN 1
Patient tentatively scheduled for left arm fistula creation on 03/28/2024  Pre-op instructions provided. Pt given verbal and written instructions with teach back on chlorhexidine wash. Pt verbalized understanding with return demonstration.  Labs done

## 2024-03-21 NOTE — H&P PST ADULT - PROBLEM SELECTOR PLAN 2
Patient instructed to take torsemide with a sip of water on the morning of procedure. Patient instructed to take Soaanz (torsemide) with a sip of water on the morning of procedure.  Requesting copy of cardiology evaluation , recent EKG as per surgeon's request   Pt scheduled for cardiology evaluation on 03/25/2024.

## 2024-03-21 NOTE — H&P PST ADULT - CARDIOVASCULAR
details… regular rate and rhythm/S1 S2 present/no JVD/vascular regular rate and rhythm/S1 S2 present/no JVD/pedal edema/vascular

## 2024-03-21 NOTE — H&P PST ADULT - OTHER CARE PROVIDERS
Dr. Naren Valencia -[phone] -Cardiologist Dr.Radha Gay  [phone] previous Cardiologist , Dr. Jonel Coyle [phone] Nephrologist Dr. Naren Valencia -[phone] -Cardiologist , Dr.Hitesh Coyle [phone] Nephrologist

## 2024-03-21 NOTE — H&P PST ADULT - HISTORY OF PRESENT ILLNESS
Ms. Hernandez is a 57 yo F w/ HTN, HLD, DM2, CKD (stage 3-4), hypothyroidism (c/b myxedema coma in past), severe pulmonary hypertension, mod-severe TR, asthma, HFpEF, 58 year old female with hx of HTN, HLD, HFpEF -EF 60-65% 09/25/23 ( no recent exacerbation ) , mod-severeTR , pulmonary HTN,Type 2 DM , hypothyroidism w/ recent myxedema coma  (Hospitalized 12/16/22 to 1/4/23 requiring intubation at [facility]), asthma , CKD (stage 3-4 -HD -MWF ), presents to PST with pre op dx of end stage renal disease is scheduled for left arm fistula creation .    ***Pt was hospitalized 09/20/23 for AHRF likely 2/2 volume overload for urgent HD w/ course c/b obstructive shock in the setting of uremic pericardial tamponade - s/p pericardial drain placement and removal . Last echo 9/25/23 showed left ventricular systolic function is normal with an ejection fraction visually estimated at 60 to 65 %.trace pericardial effusion .

## 2024-03-21 NOTE — H&P PST ADULT - FUNCTIONAL STATUS
Patient will need to see me or be evaluated in UC/ER for this as he continues to have pain not controlled by this medication. Thank You   mets dasi score 4.06 Walks 1 to 2 blocks ,carries groceries, ADLs/4-10 METS mets dasi score 4.06 , Walks 1 to 2 blocks ,carries groceries, ADLs/4-10 METS

## 2024-03-21 NOTE — H&P PST ADULT - GENITOURINARY COMMENTS
Pre op dx- End stage renal disease hx of ESRD- on HD -MWF via right chest wall tunnelled dialysis catheter

## 2024-03-22 NOTE — PROVIDER CONTACT NOTE (CRITICAL VALUE NOTIFICATION) - RECOMMENDATIONS
Patient requesting refill(s) of: buspar     Last filled: 9/18/23  Last appt: 3/20/24  Next appt: none   Pharmacy: [facility]    To transfuse one unit of PRBC

## 2024-03-25 ENCOUNTER — INPATIENT (INPATIENT)
Facility: HOSPITAL | Age: 59
LOS: 3 days | Discharge: HOME | End: 2024-03-29
Attending: INTERNAL MEDICINE | Admitting: INTERNAL MEDICINE
Payer: COMMERCIAL

## 2024-03-25 VITALS
HEART RATE: 48 BPM | HEIGHT: 60 IN | TEMPERATURE: 98 F | RESPIRATION RATE: 18 BRPM | DIASTOLIC BLOOD PRESSURE: 56 MMHG | SYSTOLIC BLOOD PRESSURE: 125 MMHG | OXYGEN SATURATION: 99 %

## 2024-03-25 DIAGNOSIS — R00.1 BRADYCARDIA, UNSPECIFIED: ICD-10-CM

## 2024-03-25 DIAGNOSIS — Z98.49 CATARACT EXTRACTION STATUS, UNSPECIFIED EYE: Chronic | ICD-10-CM

## 2024-03-25 DIAGNOSIS — Z29.9 ENCOUNTER FOR PROPHYLACTIC MEASURES, UNSPECIFIED: ICD-10-CM

## 2024-03-25 DIAGNOSIS — Z79.899 OTHER LONG TERM (CURRENT) DRUG THERAPY: ICD-10-CM

## 2024-03-25 DIAGNOSIS — E03.9 HYPOTHYROIDISM, UNSPECIFIED: ICD-10-CM

## 2024-03-25 DIAGNOSIS — E11.9 TYPE 2 DIABETES MELLITUS WITHOUT COMPLICATIONS: ICD-10-CM

## 2024-03-25 DIAGNOSIS — N18.6 END STAGE RENAL DISEASE: ICD-10-CM

## 2024-03-25 LAB
ALBUMIN SERPL ELPH-MCNC: 4.4 G/DL — SIGNIFICANT CHANGE UP (ref 3.3–5)
ALP SERPL-CCNC: 395 U/L — HIGH (ref 40–120)
ALT FLD-CCNC: 20 U/L — SIGNIFICANT CHANGE UP (ref 4–33)
ANION GAP SERPL CALC-SCNC: 13 MMOL/L — SIGNIFICANT CHANGE UP (ref 7–14)
AST SERPL-CCNC: 24 U/L — SIGNIFICANT CHANGE UP (ref 4–32)
BASOPHILS # BLD AUTO: 0.04 K/UL — SIGNIFICANT CHANGE UP (ref 0–0.2)
BASOPHILS NFR BLD AUTO: 0.6 % — SIGNIFICANT CHANGE UP (ref 0–2)
BILIRUB SERPL-MCNC: 0.4 MG/DL — SIGNIFICANT CHANGE UP (ref 0.2–1.2)
BUN SERPL-MCNC: 47 MG/DL — HIGH (ref 7–23)
BUN SERPL-MCNC: 47 MG/DL — HIGH (ref 7–23)
CALCIUM SERPL-MCNC: 9.3 MG/DL — SIGNIFICANT CHANGE UP (ref 8.4–10.5)
CALCIUM SERPL-MCNC: 9.4 MG/DL — SIGNIFICANT CHANGE UP (ref 8.4–10.5)
CHLORIDE SERPL-SCNC: 101 MMOL/L — SIGNIFICANT CHANGE UP (ref 98–107)
CO2 SERPL-SCNC: 22 MMOL/L — SIGNIFICANT CHANGE UP (ref 22–31)
CO2 SERPL-SCNC: 23 MMOL/L — SIGNIFICANT CHANGE UP (ref 22–31)
CREAT SERPL-MCNC: 3.27 MG/DL — HIGH (ref 0.5–1.3)
CREAT SERPL-MCNC: 3.36 MG/DL — HIGH (ref 0.5–1.3)
EGFR: 15 ML/MIN/1.73M2 — LOW
EGFR: 16 ML/MIN/1.73M2 — LOW
EOSINOPHIL # BLD AUTO: 0.4 K/UL — SIGNIFICANT CHANGE UP (ref 0–0.5)
EOSINOPHIL NFR BLD AUTO: 5.6 % — SIGNIFICANT CHANGE UP (ref 0–6)
GLUCOSE BLDC GLUCOMTR-MCNC: 115 MG/DL — HIGH (ref 70–99)
GLUCOSE BLDC GLUCOMTR-MCNC: 146 MG/DL — HIGH (ref 70–99)
GLUCOSE BLDC GLUCOMTR-MCNC: 386 MG/DL — HIGH (ref 70–99)
GLUCOSE BLDC GLUCOMTR-MCNC: 57 MG/DL — LOW (ref 70–99)
GLUCOSE BLDC GLUCOMTR-MCNC: 61 MG/DL — LOW (ref 70–99)
GLUCOSE SERPL-MCNC: 123 MG/DL — HIGH (ref 70–99)
GLUCOSE SERPL-MCNC: 318 MG/DL — HIGH (ref 70–99)
HCT VFR BLD CALC: 32.5 % — LOW (ref 34.5–45)
HGB BLD-MCNC: 9.8 G/DL — LOW (ref 11.5–15.5)
IANC: 5.34 K/UL — SIGNIFICANT CHANGE UP (ref 1.8–7.4)
IMM GRANULOCYTES NFR BLD AUTO: 0.4 % — SIGNIFICANT CHANGE UP (ref 0–0.9)
LYMPHOCYTES # BLD AUTO: 0.94 K/UL — LOW (ref 1–3.3)
LYMPHOCYTES # BLD AUTO: 13.1 % — SIGNIFICANT CHANGE UP (ref 13–44)
MAGNESIUM SERPL-MCNC: 2.2 MG/DL — SIGNIFICANT CHANGE UP (ref 1.6–2.6)
MAGNESIUM SERPL-MCNC: 2.3 MG/DL — SIGNIFICANT CHANGE UP (ref 1.6–2.6)
MCHC RBC-ENTMCNC: 23.8 PG — LOW (ref 27–34)
MCHC RBC-ENTMCNC: 30.2 GM/DL — LOW (ref 32–36)
MCV RBC AUTO: 79.1 FL — LOW (ref 80–100)
MONOCYTES # BLD AUTO: 0.44 K/UL — SIGNIFICANT CHANGE UP (ref 0–0.9)
MONOCYTES NFR BLD AUTO: 6.1 % — SIGNIFICANT CHANGE UP (ref 2–14)
NEUTROPHILS # BLD AUTO: 5.34 K/UL — SIGNIFICANT CHANGE UP (ref 1.8–7.4)
NEUTROPHILS NFR BLD AUTO: 74.2 % — SIGNIFICANT CHANGE UP (ref 43–77)
NRBC # BLD: 0 /100 WBCS — SIGNIFICANT CHANGE UP (ref 0–0)
NRBC # FLD: 0 K/UL — SIGNIFICANT CHANGE UP (ref 0–0)
PHOSPHATE SERPL-MCNC: 4.9 MG/DL — HIGH (ref 2.5–4.5)
PLATELET # BLD AUTO: 130 K/UL — LOW (ref 150–400)
POTASSIUM SERPL-MCNC: 5.8 MMOL/L — HIGH (ref 3.5–5.3)
POTASSIUM SERPL-SCNC: 5.8 MMOL/L — HIGH (ref 3.5–5.3)
PROT SERPL-MCNC: 8.1 G/DL — SIGNIFICANT CHANGE UP (ref 6–8.3)
RBC # BLD: 4.11 M/UL — SIGNIFICANT CHANGE UP (ref 3.8–5.2)
RBC # FLD: 19.9 % — HIGH (ref 10.3–14.5)
SODIUM SERPL-SCNC: 137 MMOL/L — SIGNIFICANT CHANGE UP (ref 135–145)
TSH SERPL-MCNC: 6.24 UIU/ML — HIGH (ref 0.27–4.2)
WBC # BLD: 7.19 K/UL — SIGNIFICANT CHANGE UP (ref 3.8–10.5)
WBC # FLD AUTO: 7.19 K/UL — SIGNIFICANT CHANGE UP (ref 3.8–10.5)

## 2024-03-25 PROCEDURE — 99223 1ST HOSP IP/OBS HIGH 75: CPT

## 2024-03-25 PROCEDURE — 99285 EMERGENCY DEPT VISIT HI MDM: CPT

## 2024-03-25 RX ORDER — CALCIUM GLUCONATE 100 MG/ML
1 VIAL (ML) INTRAVENOUS ONCE
Refills: 0 | Status: COMPLETED | OUTPATIENT
Start: 2024-03-25 | End: 2024-03-25

## 2024-03-25 RX ORDER — DEXTROSE 50 % IN WATER 50 %
25 SYRINGE (ML) INTRAVENOUS ONCE
Refills: 0 | Status: DISCONTINUED | OUTPATIENT
Start: 2024-03-25 | End: 2024-03-29

## 2024-03-25 RX ORDER — REPAGLINIDE 1 MG/1
2 TABLET ORAL
Refills: 0 | DISCHARGE

## 2024-03-25 RX ORDER — SODIUM CHLORIDE 9 MG/ML
1000 INJECTION, SOLUTION INTRAVENOUS
Refills: 0 | Status: DISCONTINUED | OUTPATIENT
Start: 2024-03-25 | End: 2024-03-29

## 2024-03-25 RX ORDER — INSULIN HUMAN 100 [IU]/ML
5 INJECTION, SOLUTION SUBCUTANEOUS ONCE
Refills: 0 | Status: COMPLETED | OUTPATIENT
Start: 2024-03-25 | End: 2024-03-25

## 2024-03-25 RX ORDER — LEVOTHYROXINE SODIUM 125 MCG
125 TABLET ORAL DAILY
Refills: 0 | Status: DISCONTINUED | OUTPATIENT
Start: 2024-03-25 | End: 2024-03-29

## 2024-03-25 RX ORDER — DEXTROSE 50 % IN WATER 50 %
15 SYRINGE (ML) INTRAVENOUS ONCE
Refills: 0 | Status: DISCONTINUED | OUTPATIENT
Start: 2024-03-25 | End: 2024-03-29

## 2024-03-25 RX ORDER — NIFEDIPINE 30 MG
1 TABLET, EXTENDED RELEASE 24 HR ORAL
Refills: 0 | DISCHARGE

## 2024-03-25 RX ORDER — HYDRALAZINE HCL 50 MG
1 TABLET ORAL
Refills: 0 | DISCHARGE

## 2024-03-25 RX ORDER — FERROUS SULFATE 325(65) MG
325 TABLET ORAL DAILY
Refills: 0 | Status: DISCONTINUED | OUTPATIENT
Start: 2024-03-25 | End: 2024-03-29

## 2024-03-25 RX ORDER — GLUCAGON INJECTION, SOLUTION 0.5 MG/.1ML
1 INJECTION, SOLUTION SUBCUTANEOUS ONCE
Refills: 0 | Status: DISCONTINUED | OUTPATIENT
Start: 2024-03-25 | End: 2024-03-29

## 2024-03-25 RX ORDER — NIFEDIPINE 30 MG
30 TABLET, EXTENDED RELEASE 24 HR ORAL DAILY
Refills: 0 | Status: DISCONTINUED | OUTPATIENT
Start: 2024-03-25 | End: 2024-03-29

## 2024-03-25 RX ORDER — INSULIN LISPRO 100/ML
VIAL (ML) SUBCUTANEOUS AT BEDTIME
Refills: 0 | Status: DISCONTINUED | OUTPATIENT
Start: 2024-03-25 | End: 2024-03-29

## 2024-03-25 RX ORDER — DEXTROSE 50 % IN WATER 50 %
50 SYRINGE (ML) INTRAVENOUS ONCE
Refills: 0 | Status: COMPLETED | OUTPATIENT
Start: 2024-03-25 | End: 2024-03-25

## 2024-03-25 RX ORDER — HEPARIN SODIUM 5000 [USP'U]/ML
5000 INJECTION INTRAVENOUS; SUBCUTANEOUS EVERY 12 HOURS
Refills: 0 | Status: DISCONTINUED | OUTPATIENT
Start: 2024-03-25 | End: 2024-03-29

## 2024-03-25 RX ORDER — SODIUM ZIRCONIUM CYCLOSILICATE 10 G/10G
10 POWDER, FOR SUSPENSION ORAL ONCE
Refills: 0 | Status: COMPLETED | OUTPATIENT
Start: 2024-03-25 | End: 2024-03-25

## 2024-03-25 RX ORDER — DEXTROSE 50 % IN WATER 50 %
12.5 SYRINGE (ML) INTRAVENOUS ONCE
Refills: 0 | Status: DISCONTINUED | OUTPATIENT
Start: 2024-03-25 | End: 2024-03-29

## 2024-03-25 RX ORDER — HYDRALAZINE HCL 50 MG
75 TABLET ORAL AT BEDTIME
Refills: 0 | Status: DISCONTINUED | OUTPATIENT
Start: 2024-03-25 | End: 2024-03-26

## 2024-03-25 RX ORDER — INSULIN LISPRO 100/ML
VIAL (ML) SUBCUTANEOUS
Refills: 0 | Status: DISCONTINUED | OUTPATIENT
Start: 2024-03-25 | End: 2024-03-29

## 2024-03-25 RX ADMIN — Medication 100 GRAM(S): at 18:41

## 2024-03-25 RX ADMIN — SODIUM ZIRCONIUM CYCLOSILICATE 10 GRAM(S): 10 POWDER, FOR SUSPENSION ORAL at 18:41

## 2024-03-25 RX ADMIN — Medication 1 TABLET(S): at 22:25

## 2024-03-25 RX ADMIN — Medication 75 MILLIGRAM(S): at 22:24

## 2024-03-25 RX ADMIN — INSULIN HUMAN 5 UNIT(S): 100 INJECTION, SOLUTION SUBCUTANEOUS at 18:42

## 2024-03-25 RX ADMIN — Medication 50 MILLILITER(S): at 18:42

## 2024-03-25 NOTE — H&P ADULT - NSHPPHYSICALEXAM_GEN_ALL_CORE
PHYSICAL EXAM:      Constitutional: NAD, well-groomed, well-developed  HEENT:  EOMI, Normal Hearing  Neck: No LAD, No JVD  Back: Normal spine flexure, No CVA tenderness  Respiratory: CTAB  Cardiovascular: S1 and S2, RRR,2/ systolic murmur   Gastrointestinal: BS+, soft, NT/ND  Extremities: No peripheral edema, left leg appears > right   Vascular: 2+ peripheral pulses  Neurological: A/O x 3, no focal deficits  Psychiatric: Normal mood, normal affect  Musculoskeletal: 5/5 strength b/l upper and lower extremities  Skin: No rashes

## 2024-03-25 NOTE — H&P ADULT - NSHPOUTPATIENTPROVIDERS_GEN_ALL_CORE
Dr. Bryan Fernandez [phone]  Dr. Naren Valencia [phone] -Cardiologist , Dr.Hitesh Coyle [phone] Nephrologist

## 2024-03-25 NOTE — H&P ADULT - NSHPLABSRESULTS_GEN_ALL_CORE
9.8    7.19  )-----------( 130      ( 25 Mar 2024 17:19 )             32.5     03-25    137  |  101  |  47<H>  ----------------------------<  318<H>  5.8<H>   |  23  |  3.27<H>    Ca    9.4      25 Mar 2024 17:19  Phos  4.9     03-25  Mg     2.30     03-25    TPro  8.1  /  Alb  4.4  /  TBili  0.4  /  DBili  x   /  AST  24  /  ALT  20  /  AlkPhos  395<H>  03-25    CAPILLARY BLOOD GLUCOSE      POCT Blood Glucose.: 386 mg/dL (25 Mar 2024 18:55)  POCT Blood Glucose.: 305 mg/dL (25 Mar 2024 15:25)      Urinalysis Basic - ( 25 Mar 2024 17:19 )    Color: x / Appearance: x / SG: x / pH: x  Gluc: 318 mg/dL / Ketone: x  / Bili: x / Urobili: x   Blood: x / Protein: x / Nitrite: x   Leuk Esterase: x / RBC: x / WBC x   Sq Epi: x / Non Sq Epi: x / Bacteria: x      Vital Signs Last 24 Hrs  T(C): 36.4 (25 Mar 2024 20:40), Max: 36.9 (25 Mar 2024 18:58)  T(F): 97.6 (25 Mar 2024 20:40), Max: 98.4 (25 Mar 2024 18:58)  HR: 59 (25 Mar 2024 20:40) (48 - 59)  BP: 128/65 (25 Mar 2024 20:40) (124/62 - 128/65)  BP(mean): --  RR: 17 (25 Mar 2024 20:40) (17 - 18)  SpO2: 100% (25 Mar 2024 20:40) (99% - 100%)    Parameters below as of 25 Mar 2024 20:40  Patient On (Oxygen Delivery Method): room air

## 2024-03-25 NOTE — H&P ADULT - HISTORY OF PRESENT ILLNESS
59 yo f  HFpEF -EF 60-65% 09/25/23 ( no recent exacerbation ) , mod-severeTR , pulmonary htn, dm2, htn, hypothyroid on synthroid ( myxedema coma 1/2023 )  esrd on dialysis via [facility]. Was at cardiology today for preop optimization prior to left arm fistula creation planned for 3/28/24. Noted to be bradycardic as low as 39, and sent to ed. Pt denies recent lightheadedness,  dizziness cp, or sob. Denies recent low FS. Currently well-appearing with no clinical signs of infection. TSH 6.24. HR at time of my exam 58. earlier ekg  sinus ravinder at 48      Of note, pt was hospitalized 09/20/23 for hypercapnic resp failure likely 2/2 volume overload for urgent HD w/ course c/b obstructive shock in the setting of uremic pericardial tamponade - s/p pericardial drain placement and removal . Last echo 9/25/23 showed left ventricular systolic function is normal with an ejection fraction visually estimated at 60 to 65 %.trace pericardial effusion

## 2024-03-25 NOTE — H&P ADULT - PROBLEM SELECTOR PLAN 3
-c/w synthroid 125  -will ordered complete tft in am  -will require endocrine input  in am  -bradycardia unlikely explained by mild degree of hypothyroidism but await complete tft panel

## 2024-03-25 NOTE — ED ADULT TRIAGE NOTE - CHIEF COMPLAINT QUOTE
Pt was sent   in by her doctor for bradycardia . Pts HR in the 40s. Pt with co weakness , denies dizziness . Pt with no co chest pain, pt is a dialysis pt last treatment was saturday. Pt with left av fistula and right cw larry

## 2024-03-25 NOTE — ED PROVIDER NOTE - PROGRESS NOTE DETAILS
Jose M Cormier MD PGY2: Labs nonactionable. Discussed with Dr. daugherty, to admit for further workup. discussed with pt, leo Daugherty instructed to admit to [facility].

## 2024-03-25 NOTE — PHARMACOTHERAPY INTERVENTION NOTE - COMMENTS
Medication history is incomplete. Medication list updated in Outpatient Medication Record (OMR) per patient, family at bedside and [facility].     Please call spectra [phone] if you have any questions.  Medication history is incomplete. Medication list updated in Outpatient Medication Record (OMR) per patient, family at bedside and [facility].   Unable to reach [facility] in [place] (disconnected after being on hold for 55 minutes); verified medications and dates of dispensing with [facility] [place] (via central search) as well as [employer].   Medications listed on OMR per patient/family directions. Medication prescribed as follows:  ·	Nifedipine ER 30mg tablets - 1 tablet orally 2 times a day (patient/family state patient can only take 60mg ER tablet cut in half, once a day in the morning; CANNOT take 30mg ER table as it drops patient's pressure/pulse too low)  ·	Hydralazine 50mg tablet - 1 tablet orally 3 times a day (patient/family state patient only takes 1.5 tablets (75mg) orally at bedtime)  ·	Patient's family states patient is no longer taking pantoprazole, calcium acetate, atorvastatin  Please call spectra [phone] if you have any questions.

## 2024-03-25 NOTE — ED ADULT NURSE NOTE - OBJECTIVE STATEMENT
Pt presented to ER sts she was sent from Cardiologist for bradycardia and hypotension, denies pain/distress, arrived with hemodialysis shunt to R subclavian, sts its scheduled to be changed, MWKERI, sts she was recalled and had more fluid removed on sat, denies sob. In ER eval by Provider, labs obtained, family is at bedside, awaiting results. RICKY Chen

## 2024-03-25 NOTE — ED PROVIDER NOTE - CLINICAL SUMMARY MEDICAL DECISION MAKING FREE TEXT BOX
58 yr old female with hx of HTN, HLD, HFpEF -EF 60-65% 09/25/23 ( no recent exacerbation ) , mod-severeTR , pulmonary HTN,Type 2 DM , hypothyroidism w/ recent myxedema coma  (Hospitalized 12/16/22 to 1/4/23 requiring intubation at [facility]), asthma , CKD (stage 3-4 -HD -MWF ), presents to ed from cardio office dr daugherty for asx bradycardia. pt has been in the 40's to 50's for over 1 yr. no changes in meds, no cp, no syncope, had dialysis 2 days ago.    asx ravinder- r/o acs vs lytes imbalance vs heart block vs baseline- labs, ekg, cxr, admit. pt not on meds that would cause this, no infectious cause.

## 2024-03-25 NOTE — H&P ADULT - PROBLEM SELECTOR PLAN 4
Ade is calling from the Cardiac rehab, [facility].  Patient is there right now, she has numbness right temple area also this am she was coughing up dry blood.  Appointment was scheduled for today at 11:00, but Ade would like a call back to discuss.     ISS

## 2024-03-25 NOTE — H&P ADULT - PROBLEM SELECTOR PLAN 1
-bradycardia unlikely explained by mild degree of hypothyroidism but await complete tft panel. No evidence of recent hypoglycemia or current infection  -c/w tele; tte ordered. will need EP input in am

## 2024-03-25 NOTE — PHARMACOTHERAPY INTERVENTION NOTE - INTERVENTION TYPE MED REC
[facility]    Chief Complaint   Patient presents with     Rectal/perineal Pain       [facility] Medical Record Number:  [id number]  Place of Service where encounter took place:  [facility] ([facility]) [[id number]]    HPI:    Earl Sanchez is a 89 year old  (9/17/1925), who is being seen today for an episodic care visit.  HPI information obtained from: facility chart records, facility staff and patient report.    Today's concern is:  1. Rectal pain  2. External hemorrhoids  Complaining of rectal pain. Reports he has long history of hemorrhoids. Staff states there is bruising left and right of rectum. Resident was getting out of bed upon today's exam and complained of right hip pain. Reviewing HPI with resident 5 minutes later, resident did not remember reporting any pain and is denying statements.     3. Hip pain, right  Reported right hip pain. Denies 5 minutes later. No injury noted.     4. Slow transit constipation  Does have difficulty defecating. Reports he can go several days without BM.  Has Senna available PRN but forgets to ask.      BP Reading  136/76  139/79  140/75    HR:  62-80  Wt Readings from Last 4 Encounters:   01/21/19 76.2 kg (168 lb)   01/17/19 76.2 kg (168 lb)   01/15/19 76.2 kg (168 lb)   01/03/19 75.8 kg (167 lb)     ALLERGIES: No known allergies  Past Medical, Surgical, Family and Social History reviewed and updated in [employer].    Current Outpatient Medications   Medication Sig Dispense Refill     acetaminophen (TYLENOL) 500 MG tablet Take 1,000 mg by mouth every 6 hours as needed for mild pain        allopurinol (ZYLOPRIM) 100 MG tablet Take 100 mg by mouth 2 times daily       aspirin (ASPIRIN LOW DOSE) 81 MG tablet Take 81 mg by mouth daily        donepezil (ARICEPT) 5 MG tablet Take 5 mg by mouth At Bedtime       doxazosin (CARDURA) 4 MG tablet Take 1 tablet (4 mg) by mouth At Bedtime 90 tablet 3     ERGOCALCIFEROL PO Take 50,000 Units by mouth every morning  "Wednesday        LEVOTHYROXINE SODIUM PO Take 75 mcg by mouth daily        nitroglycerin (NITROSTAT) 0.4 MG SL tablet Place 1 tablet (0.4 mg) under the tongue every 5 minutes as needed for chest pain 25 tablet 0     polyethylene glycol 3350 POWD Take 17 g by mouth daily       pravastatin (PRAVACHOL) 40 MG tablet TAKE 1 TABLET DAILY 90 tablet 1     Sennosides-Docusate Sodium (SENNA-DOCUSATE SODIUM) 8.6-50 MG TABS Take 50 mg by mouth as needed       triamcinolone (KENALOG) 0.025 % cream Apply topically every 12 hours as needed to rash to the affected area prn, apply tin layer to the rash at the LE.       Warfarin Sodium (COUMADIN PO) As directed, per INR       Medications reviewed:  Medications reconciled to facility chart and changes were made to reflect current medications as identified as above med list. Below are the changes that were made:   Medications stopped since last EPIC medication reconciliation:   There are no discontinued medications.    Medications started since last [facility] medication reconciliation:  No orders of the defined types were placed in this encounter.        REVIEW OF SYSTEMS:  Unobtainable secondary to cognitive impairment.     Physical Exam:  /76   Pulse 70   Temp 96.8  F (36  C)   Resp 18   Ht 1.753 m (5' 9\")   Wt 76.2 kg (168 lb)   SpO2 96%   BMI 24.81 kg/m    GENERAL APPEARANCE:  Alert, in no distress  ENT:  Mouth and posterior oropharynx normal, moist mucous membranes, normal hearing acuity  RESP:  respiratory effort and palpation of chest normal, lungs clear to auscultation , no respiratory distress  CV:  Palpation and auscultation of heart done , regular rate and rhythm, no murmur, rub, or gallop  ABDOMEN:  normal bowel sounds, soft, nontender, no hepatosplenomegaly or other masses  M/S:   Gait and station normal  Digits and nails normal  EZ stand  SKIN:  contusion noted left of rectum 6 cm/3 cm and right rectum 1.5 cm x 0.5 cm   NEURO:   Cranial nerves 2-12 are normal " tested and grossly at patient's baseline  PSYCH:  memory impaired , affect and mood normal    Recent Labs:   CBC RESULTS:   Recent Labs   Lab Test 11/15/18 05/24/18   WBC 7.2 6.7   RBC 3.81* 4.16*   HGB 11.8* 13.0*   HCT 38.2* 40.1    96   MCH 31.0 31.3   MCHC 30.9* 32.4   RDW 14.1 15.9*    210       Last Basic Metabolic Panel:  Recent Labs   Lab Test 11/15/18 05/24/18    140   POTASSIUM 3.9 3.8   CHLORIDE 110* 106   JAYME 9.2 9.4   CO2 25 28   BUN 26 19   CR 0.99 1.16   GLC 70 91       Liver Function Studies -   Recent Labs   Lab Test 02/05/18  1005 01/17/18  2212   PROTTOTAL 7.3 7.5   ALBUMIN 3.3* 3.3*   BILITOTAL 0.7 0.5   ALKPHOS 97 118   AST 21 17   ALT 15 18       TSH   Date Value Ref Range Status   06/26/2018 2.06 0.30 - 5.00 uIU/mL Final   05/24/2018 5.92 (H) 0.30 - 5.00 uIU/mL Final       Lab Results   Component Value Date    A1C 5.9 02/05/2015    A1C 6.2 04/08/2014         Assessment/Plan:  (K62.89) Rectal pain  (primary encounter diagnosis)  (K64.4) External hemorrhoids  Visual hemorrhoids noted. Perianal hematoma without rectal bleeding.   -Scheduled senna 1 tab bid  -Preparation H PRN  -STAT INR- resident currently taking warfarin. Last INR therapeutic.     (M25.551) Right hip pain  Intermittent. Difficult to obtain HPI 2/2 cognitive impairment.   -Continue PRN acetaminophen. Resident appears comfortable.     (K59.01) Slow transit constipation  Longstanding history.   -Schedule senna 1 tab BID        Electronically signed by  ANGEL Corrigan CNP                 Med Rec - Admission

## 2024-03-25 NOTE — H&P ADULT - PROBLEM SELECTOR PLAN 2
-typically dialyzed  m/w/f but last  dialyzed  Sat for  "extra fluid removal" . pt did not get dialyzed yet today, but appears euvolemic; Mentating well, vitals stable  -c/w bp meds and torsemide (pt still voids)   -renal to be called for dialysis tomorrow

## 2024-03-25 NOTE — ED PROVIDER NOTE - OBJECTIVE STATEMENT
58 yr old female with hx of HTN, HLD, HFpEF -EF 60-65% 09/25/23 ( no recent exacerbation ) , mod-severeTR , pulmonary HTN,Type 2 DM , hypothyroidism w/ recent myxedema coma  (Hospitalized 12/16/22 to 1/4/23 requiring intubation at [facility]), asthma , CKD (stage 3-4 -HD -MWF ), presents to ed from cardio office dr duagherty for asx bradycardia. pt has been in the 40's to 50's for over 1 yr. no changes in meds, no cp, no syncope, had dialysis 2 days ago.

## 2024-03-25 NOTE — H&P ADULT - NSHPREVIEWOFSYSTEMS_GEN_ALL_CORE
Review of Systems:   CONSTITUTIONAL: No fever  EYES: No eye pain, visual disturbances, or discharge  ENMT:  No difficulty hearing, tinnitus, vertigo; No sinus or throat pain  NECK: No pain or stiffness  RESPIRATORY: No cough, wheezing, chills or hemoptysis; No shortness of breath  CARDIOVASCULAR: No chest pain, palpitations, dizziness, or leg swelling  GASTROINTESTINAL: No abdominal or epigastric pain. No nausea, vomiting, or hematemesis; No diarrhea or constipation. No melena or hematochezia.  GENITOURINARY: No dysuria  NEUROLOGICAL: No headaches  MUSCULOSKELETAL: No joint pain or swelling; No muscle, back, or extremity pain

## 2024-03-26 ENCOUNTER — RESULT REVIEW (OUTPATIENT)
Age: 59
End: 2024-03-26

## 2024-03-26 DIAGNOSIS — R00.1 BRADYCARDIA, UNSPECIFIED: ICD-10-CM

## 2024-03-26 DIAGNOSIS — D64.9 ANEMIA, UNSPECIFIED: ICD-10-CM

## 2024-03-26 LAB
ALBUMIN SERPL ELPH-MCNC: 4.2 G/DL — SIGNIFICANT CHANGE UP (ref 3.3–5)
ALP SERPL-CCNC: 377 U/L — HIGH (ref 40–120)
ALT FLD-CCNC: 22 U/L — SIGNIFICANT CHANGE UP (ref 4–33)
ANION GAP SERPL CALC-SCNC: 13 MMOL/L — SIGNIFICANT CHANGE UP (ref 7–14)
ANION GAP SERPL CALC-SCNC: 15 MMOL/L — HIGH (ref 7–14)
APTT BLD: 39 SEC — HIGH (ref 24.5–35.6)
AST SERPL-CCNC: 23 U/L — SIGNIFICANT CHANGE UP (ref 4–32)
BASOPHILS # BLD AUTO: 0.03 K/UL — SIGNIFICANT CHANGE UP (ref 0–0.2)
BASOPHILS NFR BLD AUTO: 0.5 % — SIGNIFICANT CHANGE UP (ref 0–2)
BILIRUB SERPL-MCNC: 0.5 MG/DL — SIGNIFICANT CHANGE UP (ref 0.2–1.2)
BUN SERPL-MCNC: 48 MG/DL — HIGH (ref 7–23)
CALCIUM SERPL-MCNC: 9.2 MG/DL — SIGNIFICANT CHANGE UP (ref 8.4–10.5)
CHLORIDE SERPL-SCNC: 103 MMOL/L — SIGNIFICANT CHANGE UP (ref 98–107)
CHLORIDE SERPL-SCNC: 105 MMOL/L — SIGNIFICANT CHANGE UP (ref 98–107)
CO2 SERPL-SCNC: 21 MMOL/L — LOW (ref 22–31)
CREAT SERPL-MCNC: 3.38 MG/DL — HIGH (ref 0.5–1.3)
DIALYSIS INSTRUMENT RESULT - HEPATITIS B SURFACE ANTIGEN: NEGATIVE — SIGNIFICANT CHANGE UP
EGFR: 15 ML/MIN/1.73M2 — LOW
EOSINOPHIL # BLD AUTO: 0.36 K/UL — SIGNIFICANT CHANGE UP (ref 0–0.5)
EOSINOPHIL NFR BLD AUTO: 6.4 % — HIGH (ref 0–6)
GLUCOSE BLDC GLUCOMTR-MCNC: 112 MG/DL — HIGH (ref 70–99)
GLUCOSE BLDC GLUCOMTR-MCNC: 114 MG/DL — HIGH (ref 70–99)
GLUCOSE BLDC GLUCOMTR-MCNC: 196 MG/DL — HIGH (ref 70–99)
GLUCOSE BLDC GLUCOMTR-MCNC: 223 MG/DL — HIGH (ref 70–99)
GLUCOSE BLDC GLUCOMTR-MCNC: 267 MG/DL — HIGH (ref 70–99)
GLUCOSE SERPL-MCNC: 109 MG/DL — HIGH (ref 70–99)
HCT VFR BLD CALC: 32.3 % — LOW (ref 34.5–45)
HGB BLD-MCNC: 9.8 G/DL — LOW (ref 11.5–15.5)
IANC: 3.88 K/UL — SIGNIFICANT CHANGE UP (ref 1.8–7.4)
IMM GRANULOCYTES NFR BLD AUTO: 0.4 % — SIGNIFICANT CHANGE UP (ref 0–0.9)
INR BLD: 0.99 RATIO — SIGNIFICANT CHANGE UP (ref 0.85–1.18)
LYMPHOCYTES # BLD AUTO: 0.97 K/UL — LOW (ref 1–3.3)
LYMPHOCYTES # BLD AUTO: 17.2 % — SIGNIFICANT CHANGE UP (ref 13–44)
MCHC RBC-ENTMCNC: 23.7 PG — LOW (ref 27–34)
MCHC RBC-ENTMCNC: 30.3 GM/DL — LOW (ref 32–36)
MCV RBC AUTO: 78 FL — LOW (ref 80–100)
MONOCYTES # BLD AUTO: 0.37 K/UL — SIGNIFICANT CHANGE UP (ref 0–0.9)
MONOCYTES NFR BLD AUTO: 6.6 % — SIGNIFICANT CHANGE UP (ref 2–14)
NEUTROPHILS # BLD AUTO: 3.88 K/UL — SIGNIFICANT CHANGE UP (ref 1.8–7.4)
NEUTROPHILS NFR BLD AUTO: 68.9 % — SIGNIFICANT CHANGE UP (ref 43–77)
NRBC # BLD: 0 /100 WBCS — SIGNIFICANT CHANGE UP (ref 0–0)
NRBC # FLD: 0 K/UL — SIGNIFICANT CHANGE UP (ref 0–0)
PLATELET # BLD AUTO: 125 K/UL — LOW (ref 150–400)
POTASSIUM SERPL-MCNC: 4.5 MMOL/L — SIGNIFICANT CHANGE UP (ref 3.5–5.3)
POTASSIUM SERPL-MCNC: 5 MMOL/L — SIGNIFICANT CHANGE UP (ref 3.5–5.3)
POTASSIUM SERPL-SCNC: 4.5 MMOL/L — SIGNIFICANT CHANGE UP (ref 3.5–5.3)
POTASSIUM SERPL-SCNC: 5 MMOL/L — SIGNIFICANT CHANGE UP (ref 3.5–5.3)
PROT SERPL-MCNC: 8.1 G/DL — SIGNIFICANT CHANGE UP (ref 6–8.3)
PROTHROM AB SERPL-ACNC: 11.1 SEC — SIGNIFICANT CHANGE UP (ref 9.5–13)
RBC # BLD: 4.14 M/UL — SIGNIFICANT CHANGE UP (ref 3.8–5.2)
RBC # FLD: 19.7 % — HIGH (ref 10.3–14.5)
SODIUM SERPL-SCNC: 139 MMOL/L — SIGNIFICANT CHANGE UP (ref 135–145)
SODIUM SERPL-SCNC: 140 MMOL/L — SIGNIFICANT CHANGE UP (ref 135–145)
T3 SERPL-MCNC: 64 NG/DL — LOW (ref 80–200)
T4 AB SER-ACNC: 6.57 UG/DL — SIGNIFICANT CHANGE UP (ref 5.1–13)
WBC # BLD: 5.63 K/UL — SIGNIFICANT CHANGE UP (ref 3.8–10.5)
WBC # FLD AUTO: 5.63 K/UL — SIGNIFICANT CHANGE UP (ref 3.8–10.5)

## 2024-03-26 PROCEDURE — 93970 EXTREMITY STUDY: CPT | Mod: 26

## 2024-03-26 PROCEDURE — 93306 TTE W/DOPPLER COMPLETE: CPT | Mod: 26

## 2024-03-26 PROCEDURE — 99222 1ST HOSP IP/OBS MODERATE 55: CPT

## 2024-03-26 RX ORDER — HYDRALAZINE HCL 50 MG
50 TABLET ORAL THREE TIMES A DAY
Refills: 0 | Status: DISCONTINUED | OUTPATIENT
Start: 2024-03-26 | End: 2024-03-29

## 2024-03-26 RX ADMIN — Medication 1 DROP(S): at 06:53

## 2024-03-26 RX ADMIN — Medication 1 DROP(S): at 17:27

## 2024-03-26 RX ADMIN — Medication 3: at 17:27

## 2024-03-26 RX ADMIN — Medication 125 MICROGRAM(S): at 06:54

## 2024-03-26 RX ADMIN — Medication 30 MILLIGRAM(S): at 05:25

## 2024-03-26 RX ADMIN — Medication 60 MILLIGRAM(S): at 06:54

## 2024-03-26 RX ADMIN — Medication 2: at 12:39

## 2024-03-26 RX ADMIN — HEPARIN SODIUM 5000 UNIT(S): 5000 INJECTION INTRAVENOUS; SUBCUTANEOUS at 06:53

## 2024-03-26 RX ADMIN — Medication 50 MILLIGRAM(S): at 14:27

## 2024-03-26 RX ADMIN — Medication 1 TABLET(S): at 12:40

## 2024-03-26 RX ADMIN — HEPARIN SODIUM 5000 UNIT(S): 5000 INJECTION INTRAVENOUS; SUBCUTANEOUS at 17:26

## 2024-03-26 RX ADMIN — Medication 325 MILLIGRAM(S): at 12:40

## 2024-03-26 NOTE — CONSULT NOTE ADULT - NS ATTEND AMEND GEN_ALL_CORE FT
58 year old female  HFpEF -EF 60-65% 09/25/23 ( no recent exacerbation ),  mod-severe TR , pulmonary HTN, DM2, HTN, hypothyroid on synthroid  (myxedema coma 1/2023 ), ESRD on dialysis via [facility] sent from cardiologist's office for asymptomatic sinus bradycardia. Patient's HR was in 40s on admission. Currently telemetry shows sinus rhythm with HR 50s-60s and up to 70s while walking in the hallway. Last echocardiogram from 9/25/23 showed normal LV function. She is currently not on any AV biju blockers. TSH is 6.24. Check T3 and Free T4(elevated TSH). Continue management as per primary team. No pacing indications at this time

## 2024-03-26 NOTE — PATIENT PROFILE ADULT - FALL HARM RISK - UNIVERSAL INTERVENTIONS
Bed in lowest position, wheels locked, appropriate side rails in place/Call bell, personal items and telephone in reach/Instruct patient to call for assistance before getting out of bed or chair/Non-slip footwear when patient is out of bed/[place] to call system/Physically safe environment - no spills, clutter or unnecessary equipment/Purposeful Proactive Rounding/Room/bathroom lighting operational, light cord in reach

## 2024-03-26 NOTE — CONSULT NOTE ADULT - PROBLEM SELECTOR RECOMMENDATION 3
Admitted for asymptomatic bradycardia noted at cardiologist office now with HR in the 60s. Undergoing eval with EP and cardiology    If you have any questions, please feel free to contact me  Frederic Villareal  Nephrology Fellow  [phone]; Prefer [employer] TEAMS  (After 5pm or on weekends please page the on-call fellow).

## 2024-03-26 NOTE — CONSULT NOTE ADULT - ASSESSMENT
58 year old female  HFpEF -EF 60-65% 09/25/23 ( no recent exacerbation ),  mod-severe TR , pulmonary HTN, DM2, HTN, hypothyroid on synthroid  (myxedema coma 1/2023 ), ESRD on dialysis via [facility] sent from cardiologist's office for asymptomatic sinus bradycardia. Patient's HR was in 40s on admission. Currently telemetry shows sinus rhythm with HR 50s-60s and up to 70s while walking in the hallway. Last echocardiogram from 9/25/23 showed normal LV function. She is currently not on any AV biju blockers. TSH is 6.24  - Continue to monitor on telemetry while admitted  - Maintain K>4 and Mg>2  - Check T3 and Free T4(elevated TSH)  - Continue management as per primary team  - No pacing indications at this time

## 2024-03-26 NOTE — CONSULT NOTE ADULT - SUBJECTIVE AND OBJECTIVE BOX
CHIEF COMPLAINT:Patient is a 58y old  Female who presents with a chief complaint of bradycardia (25 Mar 2024 19:49)      HISTORY OF PRESENT ILLNESS:    58 female with history as below EF 60-65% 09/25/23 ( no recent exacerbation ) , mod-severeTR , pulmonary htn, dm2, htn, hypothyroid on synthroid ( myxedema coma 1/2023 )  esrd on dialysis  cardiac tamponade s/p pericardiocentesis   came to my office for pre op eval prior to AVG placement  pt denies any chest pain, sob, palpitation, dizziness or syncope.  however noted to have HR 40 on EKG   admitted for further work up     PAST MEDICAL & SURGICAL HISTORY:  HTN (hypertension)      HLD (hyperlipidemia)      DM (diabetes mellitus)      Hypothyroid      H/O pulmonary hypertension      CKD (chronic kidney disease)      (HFpEF) heart failure with preserved ejection fraction      S/P cataract surgery              MEDICATIONS:  heparin   Injectable 5000 Unit(s) SubCutaneous every 12 hours  hydrALAZINE 75 milliGRAM(s) Oral at bedtime  NIFEdipine XL 30 milliGRAM(s) Oral daily  torsemide 60 milliGRAM(s) Oral daily            dextrose 50% Injectable 25 Gram(s) IV Push once  dextrose 50% Injectable 12.5 Gram(s) IV Push once  dextrose 50% Injectable 25 Gram(s) IV Push once  dextrose Oral Gel 15 Gram(s) Oral once PRN  glucagon  Injectable 1 milliGRAM(s) IntraMuscular once  insulin lispro (ADMELOG) corrective regimen sliding scale   SubCutaneous three times a day before meals  insulin lispro (ADMELOG) corrective regimen sliding scale   SubCutaneous at bedtime  levothyroxine 125 MICROGram(s) Oral daily    artificial  tears Solution 1 Drop(s) Both EYES two times a day  dextrose 5%. 1000 milliLiter(s) IV Continuous <Continuous>  dextrose 5%. 1000 milliLiter(s) IV Continuous <Continuous>  ferrous    sulfate 325 milliGRAM(s) Oral daily  multivitamin 1 Tablet(s) Oral daily      FAMILY HISTORY:  FH: stroke (Father)        Non-contributory    SOCIAL HISTORY:    No tobacco, drugs or etoh    Allergies    No Known Allergies    Intolerances    	    REVIEW OF SYSTEMS:  as above  The rest of the 14 points ROS reviewed and except above they are unremarkable.        PHYSICAL EXAM:  T(C): 36.2 (03-26-24 @ 05:57), Max: 36.9 (03-25-24 @ 18:58)  HR: 58 (03-26-24 @ 05:57) (48 - 67)  BP: 181/83 (03-26-24 @ 05:57) (124/62 - 184/69)  RR: 18 (03-26-24 @ 05:57) (16 - 19)  SpO2: 98% (03-26-24 @ 05:57) (98% - 100%)  Wt(kg): --  I&O's Summary    JVP: Normal  Neck: supple  Lung: clear   CV: S1 S2 , Murmur: pos taurus   Abd: soft  Ext: No edema  neuro: Awake / alert  Psych: flat affect  Skin: normal      LABS/DATA:    TELEMETRY: 	    ECG:  	   	  CARDIAC MARKERS:                                      9.8    7.19  )-----------( 130      ( 25 Mar 2024 17:19 )             32.5     03-25    140  |  103  |  47<H>  ----------------------------<  123<H>  4.5   |  22  |  3.36<H>    Ca    9.3      25 Mar 2024 23:19  Phos  4.9     03-25  Mg     2.20     03-25    TPro  8.1  /  Alb  4.4  /  TBili  0.4  /  DBili  x   /  AST  24  /  ALT  20  /  AlkPhos  395<H>  03-25    proBNP:   Lipid Profile:   HgA1c:   TSH: Thyroid Stimulating Hormone, Serum: 6.24 uIU/mL (03-25 @ 17:19)

## 2024-03-26 NOTE — CONSULT NOTE ADULT - SUBJECTIVE AND OBJECTIVE BOX
XRAY to come get patient for scans.   [facility] DIVISION OF KIDNEY DISEASES AND HYPERTENSION -- [phone]  -- INITIAL CONSULT NOTE  --------------------------------------------------------------------------------  HPI:  58 y.o F w/ PMHx of HF      PAST HISTORY  --------------------------------------------------------------------------------  PAST MEDICAL & SURGICAL HISTORY:  HTN (hypertension)      HLD (hyperlipidemia)      DM (diabetes mellitus)      Hypothyroid      H/O pulmonary hypertension      CKD (chronic kidney disease)      (HFpEF) heart failure with preserved ejection fraction      S/P cataract surgery        FAMILY HISTORY:  FH: stroke (Father)      PAST SOCIAL HISTORY:    ALLERGIES & MEDICATIONS  --------------------------------------------------------------------------------  Allergies    No Known Allergies    Intolerances      Standing Inpatient Medications  artificial  tears Solution 1 Drop(s) Both EYES two times a day  dextrose 5%. 1000 milliLiter(s) IV Continuous <Continuous>  dextrose 5%. 1000 milliLiter(s) IV Continuous <Continuous>  dextrose 50% Injectable 25 Gram(s) IV Push once  dextrose 50% Injectable 12.5 Gram(s) IV Push once  dextrose 50% Injectable 25 Gram(s) IV Push once  ferrous    sulfate 325 milliGRAM(s) Oral daily  glucagon  Injectable 1 milliGRAM(s) IntraMuscular once  heparin   Injectable 5000 Unit(s) SubCutaneous every 12 hours  hydrALAZINE 50 milliGRAM(s) Oral three times a day  insulin lispro (ADMELOG) corrective regimen sliding scale   SubCutaneous three times a day before meals  insulin lispro (ADMELOG) corrective regimen sliding scale   SubCutaneous at bedtime  levothyroxine 125 MICROGram(s) Oral daily  multivitamin 1 Tablet(s) Oral daily  NIFEdipine XL 30 milliGRAM(s) Oral daily  torsemide 60 milliGRAM(s) Oral daily    PRN Inpatient Medications  dextrose Oral Gel 15 Gram(s) Oral once PRN      REVIEW OF SYSTEMS  --------------------------------------------------------------------------------  Gen: No fevers/chills  Head/Eyes/Ears: No HA  Respiratory: No dyspnea, cough  CV: No chest pain  GI: No abdominal pain, diarrhea  : No dysuria, hematuria  MSK: No  edema  Skin: No rashes  Heme: No easy bruising or bleeding    All other systems were reviewed and are negative, except as noted.    VITALS/PHYSICAL EXAM  --------------------------------------------------------------------------------  T(C): 36.3 (03-26-24 @ 10:00), Max: 36.9 (03-25-24 @ 18:58)  HR: 63 (03-26-24 @ 10:00) (48 - 67)  BP: 164/67 (03-26-24 @ 10:00) (124/62 - 184/69)  RR: 18 (03-26-24 @ 10:00) (16 - 19)  SpO2: 92% (03-26-24 @ 10:00) (92% - 100%)  Wt(kg): --  Height (cm): 152.4 (03-25-24 @ 15:20)        Physical Exam:  	Gen: NAD  	HEENT: Anicteric  	Pulm: CTA B/L  	CV: S1S2+  	Abd: Soft, +BS            Transplant site: RLQ non tender, well healed surgical scar.  	Ext: No LE edema B/L  	Neuro: Awake          : Singh+ with clear urine in the bag  	Skin: Warm and dry  	Dialysis access:     LABS/STUDIES  --------------------------------------------------------------------------------              9.8    5.63  >-----------<  125      [03-26-24 @ 10:00]              32.3     139  |  105  |  48  ----------------------------<  109      [03-26-24 @ 10:00]  5.0   |  21  |  3.38        Ca     9.2     [03-26-24 @ 10:00]      Mg     2.20     [03-25-24 @ 23:19]      Phos  4.9     [03-25-24 @ 17:19]    TPro  8.1  /  Alb  4.2  /  TBili  0.5  /  DBili  x   /  AST  23  /  ALT  22  /  AlkPhos  377  [03-26-24 @ 10:00]    PT/INR: PT 11.1 , INR 0.99       [03-26-24 @ 10:00]  PTT: 39.0       [03-26-24 @ 10:00]      Creatinine Trend:  SCr 3.38 [03-26 @ 10:00]  SCr 3.36 [03-25 @ 23:19]  SCr 3.27 [03-25 @ 17:19]  SCr 2.15 [03-21 @ 15:10]    Urinalysis - [03-26-24 @ 10:00]      Color  / Appearance  / SG  / pH       Gluc 109 / Ketone   / Bili  / Urobili        Blood  / Protein  / Leuk Est  / Nitrite       RBC  / WBC  / Hyaline  / Gran  / Sq Epi  / Non Sq Epi  / Bacteria       HBsAb <3.0      [09-22-23 @ 17:55]  HBsAg Nonreact      [10-02-23 @ 06:27]  HCV 0.08, Nonreact      [10-02-23 @ 06:27]  HIV Nonreact      [10-02-23 @ 06:27]    STEVO: titer 1:160, pattern Speckled      [08-21-23 @ 03:30]  dsDNA <12      [01-19-23 @ 05:02]  C3 Complement 128      [01-19-23 @ 05:02]  C4 Complement 46      [01-19-23 @ 05:02]  Rheumatoid Factor 10      [01-19-23 @ 05:02]  ANCA: cANCA Negative, pANCA Negative, atypical ANCA Indeterminate Method interference due to STEVO Fluorescence      [01-19-23 @ 05:02]  anti-GBM <0.2      [01-19-23 @ 05:02]  Free Light Chains: kappa 10.90, lambda 6.34, ratio = 1.72      [08-24 @ 06:20]  Immunofixation Serum:   No Monoclonal Band Identified. HUMPHREY Mccrary MD    Reference Range: None Detected      [01-19-23 @ 05:02]  SPEP Interpretation: Increased Beta fraction, possibly transferrin increase. HUMPHREY Mccrary MD      [01-19-23 @ 05:02]  Immunofixation Urine:   Reference Range: None Detected      [01-19-23 @ 08:10]    Tacrolimus  Cyclosporine  Sirolimus  Mycophenolate  BK PCR  CMV PCR  Parvo PCR  EBV PCR [facility] DIVISION OF KIDNEY DISEASES AND HYPERTENSION -- [phone]  -- INITIAL CONSULT NOTE  --------------------------------------------------------------------------------  HPI:  58 y.o F w/ PMHx of ESRD on HD, HF, pulmonary HTN, DM2, HTN here from cardiology office for bradycardia. She is undergoing clearance for an AVG placement on 3/28/24 for which she went to the cardiologist but then brought to hospital for bradycardia. Nephrology was consulted for ESRD management. Patient follows with Dr. Coyle at [facility]. Last outpatient HD (extra session) was on Sat but her usual days are MWF. She reports no problems with iHD at the HD center, no hypotension. Denies any headaches, fevers/chills, chest pain, palpitations, SOB, and leg swelling.    PAST HISTORY  --------------------------------------------------------------------------------  PAST MEDICAL & SURGICAL HISTORY:  HTN (hypertension)      HLD (hyperlipidemia)      DM (diabetes mellitus)      Hypothyroid      H/O pulmonary hypertension      CKD (chronic kidney disease)      (HFpEF) heart failure with preserved ejection fraction      S/P cataract surgery        FAMILY HISTORY:  FH: stroke (Father)      PAST SOCIAL HISTORY:    ALLERGIES & MEDICATIONS  --------------------------------------------------------------------------------  Allergies    No Known Allergies    Intolerances      Standing Inpatient Medications  artificial  tears Solution 1 Drop(s) Both EYES two times a day  dextrose 5%. 1000 milliLiter(s) IV Continuous <Continuous>  dextrose 5%. 1000 milliLiter(s) IV Continuous <Continuous>  dextrose 50% Injectable 25 Gram(s) IV Push once  dextrose 50% Injectable 12.5 Gram(s) IV Push once  dextrose 50% Injectable 25 Gram(s) IV Push once  ferrous    sulfate 325 milliGRAM(s) Oral daily  glucagon  Injectable 1 milliGRAM(s) IntraMuscular once  heparin   Injectable 5000 Unit(s) SubCutaneous every 12 hours  hydrALAZINE 50 milliGRAM(s) Oral three times a day  insulin lispro (ADMELOG) corrective regimen sliding scale   SubCutaneous three times a day before meals  insulin lispro (ADMELOG) corrective regimen sliding scale   SubCutaneous at bedtime  levothyroxine 125 MICROGram(s) Oral daily  multivitamin 1 Tablet(s) Oral daily  NIFEdipine XL 30 milliGRAM(s) Oral daily  torsemide 60 milliGRAM(s) Oral daily    PRN Inpatient Medications  dextrose Oral Gel 15 Gram(s) Oral once PRN      REVIEW OF SYSTEMS  --------------------------------------------------------------------------------  per above    VITALS/PHYSICAL EXAM  --------------------------------------------------------------------------------  T(C): 36.3 (03-26-24 @ 10:00), Max: 36.9 (03-25-24 @ 18:58)  HR: 63 (03-26-24 @ 10:00) (48 - 67)  BP: 164/67 (03-26-24 @ 10:00) (124/62 - 184/69)  RR: 18 (03-26-24 @ 10:00) (16 - 19)  SpO2: 92% (03-26-24 @ 10:00) (92% - 100%)  Wt(kg): --  Height (cm): 152.4 (03-25-24 @ 15:20)        Physical Exam:  	Gen: NAD  	HEENT: Anicteric  	Pulm: CTA B/L  	CV: S1S2+  	Abd: Soft, +BS           	Ext: No LE edema B/L  	Neuro: Awake  	Skin: Warm and dry  	Dialysis access: right tunneled IJ catheter    LABS/STUDIES  --------------------------------------------------------------------------------              9.8    5.63  >-----------<  125      [03-26-24 @ 10:00]              32.3     139  |  105  |  48  ----------------------------<  109      [03-26-24 @ 10:00]  5.0   |  21  |  3.38        Ca     9.2     [03-26-24 @ 10:00]      Mg     2.20     [03-25-24 @ 23:19]      Phos  4.9     [03-25-24 @ 17:19]    TPro  8.1  /  Alb  4.2  /  TBili  0.5  /  DBili  x   /  AST  23  /  ALT  22  /  AlkPhos  377  [03-26-24 @ 10:00]    PT/INR: PT 11.1 , INR 0.99       [03-26-24 @ 10:00]  PTT: 39.0       [03-26-24 @ 10:00]      Creatinine Trend:  SCr 3.38 [03-26 @ 10:00]  SCr 3.36 [03-25 @ 23:19]  SCr 3.27 [03-25 @ 17:19]  SCr 2.15 [03-21 @ 15:10]    Urinalysis - [03-26-24 @ 10:00]      Color  / Appearance  / SG  / pH       Gluc 109 / Ketone   / Bili  / Urobili        Blood  / Protein  / Leuk Est  / Nitrite       RBC  / WBC  / Hyaline  / Gran  / Sq Epi  / Non Sq Epi  / Bacteria       HBsAb <3.0      [09-22-23 @ 17:55]  HBsAg Nonreact      [10-02-23 @ 06:27]  HCV 0.08, Nonreact      [10-02-23 @ 06:27]  HIV Nonreact      [10-02-23 @ 06:27]    STEVO: titer 1:160, pattern Speckled      [08-21-23 @ 03:30]  dsDNA <12      [01-19-23 @ 05:02]  C3 Complement 128      [01-19-23 @ 05:02]  C4 Complement 46      [01-19-23 @ 05:02]  Rheumatoid Factor 10      [01-19-23 @ 05:02]  ANCA: cANCA Negative, pANCA Negative, atypical ANCA Indeterminate Method interference due to STEVO Fluorescence      [01-19-23 @ 05:02]  anti-GBM <0.2      [01-19-23 @ 05:02]  Free Light Chains: kappa 10.90, lambda 6.34, ratio = 1.72      [08-24 @ 06:20]  Immunofixation Serum:   No Monoclonal Band Identified. HUMPHREY Mccrary MD    Reference Range: None Detected      [01-19-23 @ 05:02]  SPEP Interpretation: Increased Beta fraction, possibly transferrin increase. HUMPHREY Mccrary MD      [01-19-23 @ 05:02]  Immunofixation Urine:   Reference Range: None Detected      [01-19-23 @ 08:10]    Tacrolimus  Cyclosporine  Sirolimus  Mycophenolate  BK PCR  CMV PCR  Parvo PCR  EBV PCR

## 2024-03-26 NOTE — CONSULT NOTE ADULT - SUBJECTIVE AND OBJECTIVE BOX
CHIEF COMPLAINT:  Called to evaluate patient with asymptomatic bradycardia    HISTORY OF PRESENT ILLNESS:  58 year old female  HFpEF -EF 60-65% 09/25/23 ( no recent exacerbation ),  mod-severe TR , pulmonary HTN, DM2, HTN, hypothyroid on synthroid  (myxedema coma 1/2023 ). ESRD on dialysis via shiley sent from cardiologist's office for bradycardia. Patient was at the cardiologist's office yesterday for preop optimization prior to left arm fistula creation planned for 3/28/24. She was noted to be bradycardic with HR as low as 39, and was sent to ED. EKG in ED showed sinus rhythm with HR 48 bpm. Patient denies any associated dizziness, chest pain, or SOB. She has no prior history of syncope or near syncope.  Currently well-appearing with no clinical signs of infection. TSH 6.24. Telemetry currently shows sinus rhythm with HR 50s-60s. Of note, patient was hospitalized 09/20/23 for hypercapnic respiratory failure likely 2/2 volume overload for urgent HD w/ course c/b obstructive shock in the setting of uremic pericardial tamponade - s/p pericardial drain placement and removal. Last echocardiogram on 9/25/23 showed left ventricular systolic function is normal with an ejection fraction visually estimated at 60 to 65 % with trace pericardial effusion.     PAST MEDICAL & SURGICAL HISTORY:  HTN (hypertension)  HLD (hyperlipidemia)  DM (diabetes mellitus)  Hypothyroid  H/O pulmonary hypertension  CKD (chronic kidney disease)  (HFpEF) heart failure with preserved ejection fraction  S/P cataract surgery    PREVIOUS DIAGNOSTIC TESTING:    Echocardiogram:   from: TTE Limited W or WO Ultrasound Enhancing Agent (09.25.23 @ 13:39)   CONCLUSIONS:      1. Left ventricular systolic function is normal with an ejection fraction visually estimated at 60 to 65 %.   2. Left ventricle was not well visualized.   3. Probably normal systolic function.   4. There is no echocardiographic evidence of tamponade on this study.  5. Trace pericardial effusion.    ________________________________________________________________________________________  FINDINGS:     Left Ventricle:  The left ventricle was not well visualized. Left ventricular systolic function is normal with an ejection fraction visually estimated at 60 to 65%.     Right Ventricle:  The right ventricular cavity is normal in size and probably normal systolic function.     Pericardium:  There is a trace pericardial effusion.     Pleura:  Moderate left pleural effusion noted.  ____________________________________________________________________  Quantitative Data:  Left Ventricle Measurements: (Indexed to BSA)     Visualized LV EF%: 60 to 65%       LVOT / RVOT/ Qp/Qs Data: (Indexed to BSA)  Catheterization:   from: Cardiac Catheterization (09.24.23 @ 13:56)  Pericardiocentesis   Pericardiocentesis was performed using a Pericentisis Tray from the  subxiphoid approach with fluoroscopic, echo and ECG  guidance. A long thin-walled needle was advanced into the pericardial  space until fluid was aspirated. Over a floppy wire, the  needle was replaced with a catheter. Intrapericardial pressure was  measured. 800.0 ml of bloody pericardial fluid was obtained.  All samples were sent to the lab for analysis. At the conclusion of  the procedure, the pericardial catheter was sutured in place.    Diagnostic Findings:     Exam Start Time:   01:56 PM   Exam End Time:     02:32 PM   Exam Duration:     36 min     Contrast:   Description                     Dose         Unit        Serial No.     Medication:   Description                   Dose, Unit, Route, Time   lidocaine 2 % Injectable      3.0, ml, Subcut, 14:03:44   (Xylocaine)     Inventory:   Description                   Quantity     Peoplesoft#  Reference No.    Serial No.      Lot No.     CDM  Angio Pack                    1.000         [id number]  [id number]                                     [id number]*    Patient: LAUREN CARRILLO             MRN: [id number]  Study Date: 09/24/2023   01:56 PM      Page 1 of 2          720   Sodium Heparin                1.000         [id number]  [id number]                                    [id number]*    490   Pericentisis Tray             1.000         [id number]  [id number]                                   [id number]*    956       S0   Flow Calculations, Phase: Baseline   CO                                HR  O2Insp  CI                                PO2  O2Exp  SV                                VO2                  210.52 ml/min  O2(ExAir)  SVI     A-VO2  Hb  Shunts:   Qs                                Qs Index   Qp                                Qp Index  Qp/Qs  EPBF                              EPBF Index   L-R                               L-R Index   R-L                               R-L Index     Conscious sedation was administered and monitored by Cardiology  nursing staff,  under my direct supervision when applicable.       MEDICATIONS:  heparin   Injectable 5000 Unit(s) SubCutaneous every 12 hours  hydrALAZINE 50 milliGRAM(s) Oral three times a day  NIFEdipine XL 30 milliGRAM(s) Oral daily  torsemide 60 milliGRAM(s) Oral daily  dextrose 50% Injectable 25 Gram(s) IV Push once  dextrose 50% Injectable 12.5 Gram(s) IV Push once  dextrose 50% Injectable 25 Gram(s) IV Push once  dextrose Oral Gel 15 Gram(s) Oral once PRN  glucagon  Injectable 1 milliGRAM(s) IntraMuscular once  insulin lispro (ADMELOG) corrective regimen sliding scale   SubCutaneous three times a day before meals  insulin lispro (ADMELOG) corrective regimen sliding scale   SubCutaneous at bedtime  levothyroxine 125 MICROGram(s) Oral daily    artificial  tears Solution 1 Drop(s) Both EYES two times a day  dextrose 5%. 1000 milliLiter(s) IV Continuous <Continuous>  dextrose 5%. 1000 milliLiter(s) IV Continuous <Continuous>  ferrous    sulfate 325 milliGRAM(s) Oral daily  multivitamin 1 Tablet(s) Oral daily      FAMILY HISTORY:  FH: stroke (Father)        SOCIAL HISTORY:      [-] Smoker  [-] Alcohol  [-] Drugs    Allergies    No Known Allergies    Intolerances    	    REVIEW OF SYSTEMS:  CONSTITUTIONAL: No fever, weight loss, or fatigue  EYES: No eye pain, visual disturbances, or discharge  ENMT:  No difficulty hearing, tinnitus, vertigo; No sinus or throat pain  NECK: No pain or stiffness  RESPIRATORY: No cough, wheezing, chills or hemoptysis; No Shortness of Breath  CARDIOVASCULAR: No chest pain, palpitations, passing out, dizziness, or leg swelling  GASTROINTESTINAL: No abdominal or epigastric pain. No nausea, vomiting, or hematemesis; No diarrhea or constipation. No melena or hematochezia.  GENITOURINARY: No dysuria, frequency, hematuria, or incontinence  NEUROLOGICAL: No headaches, memory loss, loss of strength, numbness, or tremors  SKIN: No itching, burning, rashes, or lesions   LYMPH Nodes: No enlarged glands  ENDOCRINE: No heat or cold intolerance; No hair loss  MUSCULOSKELETAL: No joint pain or swelling; No muscle, back, or extremity pain  PSYCHIATRIC: No depression, anxiety, mood swings, or difficulty sleeping  HEME/LYMPH: No easy bruising, or bleeding gums  ALLERY AND IMMUNOLOGIC: No hives or eczema	    [x] All others negative	  [ ] Unable to obtain    PHYSICAL EXAM:  T(C): 36.2 (03-26-24 @ 05:57), Max: 36.9 (03-25-24 @ 18:58)  HR: 58 (03-26-24 @ 05:57) (48 - 67)  BP: 181/83 (03-26-24 @ 05:57) (124/62 - 184/69)  RR: 18 (03-26-24 @ 05:57) (16 - 19)  SpO2: 98% (03-26-24 @ 05:57) (98% - 100%)  Wt(kg): --  I&O's Summary      Appearance: Normal	  Cardiovascular: Normal S1 S2, No JVD, No murmurs, No edema  Respiratory: Lungs clear to auscultation	  Psychiatry: A & O x 3, Mood & affect appropriate  Gastrointestinal:  Soft, Non-tender, + BS	  Extremities: Normal range of motion, No clubbing, cyanosis or edema      TELEMETRY: Sinus rhythm with HR 50s-70s	    ECG:  	Sinus rhythm with HR 48 bpm; Josefina 172 ms; qrs 78 ms; Qtc 439 ms  RADIOLOGY:  OTHER: 	  	  LABS:	 	    CARDIAC MARKERS:                        9.8    7.19  )-----------( 130      ( 25 Mar 2024 17:19 )             32.5     03-25    140  |  103  |  47<H>  ----------------------------<  123<H>  4.5   |  22  |  3.36<H>    Ca    9.3      25 Mar 2024 23:19  Phos  4.9     03-25  Mg     2.20     03-25    TPro  8.1  /  Alb  4.4  /  TBili  0.4  /  DBili  x   /  AST  24  /  ALT  20  /  AlkPhos  395<H>  03-25    proBNP:   Lipid Profile:   HgA1c:   TSH: Thyroid Stimulating Hormone, Serum: 6.24 uIU/mL (03-25 @ 17:19)           CHIEF COMPLAINT:  Called to evaluate patient with asymptomatic bradycardia    HISTORY OF PRESENT ILLNESS:  58 year old female  HFpEF -EF 60-65% 09/25/23 ( no recent exacerbation ),  mod-severe TR , pulmonary HTN, DM2, HTN, hypothyroid on synthroid  (myxedema coma 1/2023 ), ESRD on dialysis via shiley sent from cardiologist's office for bradycardia. Patient was at the cardiologist's office yesterday for preop optimization prior to left arm fistula creation planned for 3/28/24. She was noted to be bradycardic with HR as low as 39, and was sent to ED. EKG in ED showed sinus rhythm with HR 48 bpm. Patient denies any associated dizziness, chest pain, or SOB. She has no prior history of syncope or near syncope.  Currently well-appearing with no clinical signs of infection. TSH 6.24. Telemetry currently shows sinus rhythm with HR 50s-60s. Of note, patient was hospitalized 09/20/23 for hypercapnic respiratory failure likely 2/2 volume overload for urgent HD w/ course c/b obstructive shock in the setting of uremic pericardial tamponade - s/p pericardial drain placement and removal. Last echocardiogram on 9/25/23 showed left ventricular systolic function is normal with an ejection fraction visually estimated at 60 to 65 % with trace pericardial effusion.     PAST MEDICAL & SURGICAL HISTORY:  HTN (hypertension)  HLD (hyperlipidemia)  DM (diabetes mellitus)  Hypothyroid  H/O pulmonary hypertension  CKD (chronic kidney disease)  (HFpEF) heart failure with preserved ejection fraction  S/P cataract surgery    PREVIOUS DIAGNOSTIC TESTING:    Echocardiogram:   from: TTE Limited W or WO Ultrasound Enhancing Agent (09.25.23 @ 13:39)   CONCLUSIONS:      1. Left ventricular systolic function is normal with an ejection fraction visually estimated at 60 to 65 %.   2. Left ventricle was not well visualized.   3. Probably normal systolic function.   4. There is no echocardiographic evidence of tamponade on this study.  5. Trace pericardial effusion.    ________________________________________________________________________________________  FINDINGS:     Left Ventricle:  The left ventricle was not well visualized. Left ventricular systolic function is normal with an ejection fraction visually estimated at 60 to 65%.     Right Ventricle:  The right ventricular cavity is normal in size and probably normal systolic function.     Pericardium:  There is a trace pericardial effusion.     Pleura:  Moderate left pleural effusion noted.  ____________________________________________________________________  Quantitative Data:  Left Ventricle Measurements: (Indexed to BSA)     Visualized LV EF%: 60 to 65%       LVOT / RVOT/ Qp/Qs Data: (Indexed to BSA)  Catheterization:   from: Cardiac Catheterization (09.24.23 @ 13:56)  Pericardiocentesis   Pericardiocentesis was performed using a Pericentisis Tray from the  subxiphoid approach with fluoroscopic, echo and ECG  guidance. A long thin-walled needle was advanced into the pericardial  space until fluid was aspirated. Over a floppy wire, the  needle was replaced with a catheter. Intrapericardial pressure was  measured. 800.0 ml of bloody pericardial fluid was obtained.  All samples were sent to the lab for analysis. At the conclusion of  the procedure, the pericardial catheter was sutured in place.    Diagnostic Findings:     Exam Start Time:   01:56 PM   Exam End Time:     02:32 PM   Exam Duration:     36 min     Contrast:   Description                     Dose         Unit        Serial No.     Medication:   Description                   Dose, Unit, Route, Time   lidocaine 2 % Injectable      3.0, ml, Subcut, 14:03:44   (Xylocaine)     Inventory:   Description                   Quantity     [employer]#  Reference No.    Serial No.      Lot No.     CDM  Angio Pack                    1.000         [id number]  [id number]                                     [id number]*    Patient: LAUREN CARRILLO             MRN: [id number]  Study Date: 09/24/2023   01:56 PM      Page 1 of 2          720   Sodium Heparin                1.000         [id number]  [id number]                                    [id number]*    490   Pericentisis Tray             1.000         [id number]  [id number]                                   [id number]*    956       S0   Flow Calculations, Phase: Baseline   CO                                HR  O2Insp  CI                                PO2  O2Exp  SV                                VO2                  210.52 ml/min  O2(ExAir)  SVI     A-VO2  Hb  Shunts:   Qs                                Qs Index   Qp                                Qp Index  Qp/Qs  EPBF                              EPBF Index   L-R                               L-R Index   R-L                               R-L Index     Conscious sedation was administered and monitored by Cardiology  nursing staff,  under my direct supervision when applicable.       MEDICATIONS:  heparin   Injectable 5000 Unit(s) SubCutaneous every 12 hours  hydrALAZINE 50 milliGRAM(s) Oral three times a day  NIFEdipine XL 30 milliGRAM(s) Oral daily  torsemide 60 milliGRAM(s) Oral daily  dextrose 50% Injectable 25 Gram(s) IV Push once  dextrose 50% Injectable 12.5 Gram(s) IV Push once  dextrose 50% Injectable 25 Gram(s) IV Push once  dextrose Oral Gel 15 Gram(s) Oral once PRN  glucagon  Injectable 1 milliGRAM(s) IntraMuscular once  insulin lispro (ADMELOG) corrective regimen sliding scale   SubCutaneous three times a day before meals  insulin lispro (ADMELOG) corrective regimen sliding scale   SubCutaneous at bedtime  levothyroxine 125 MICROGram(s) Oral daily    artificial  tears Solution 1 Drop(s) Both EYES two times a day  dextrose 5%. 1000 milliLiter(s) IV Continuous <Continuous>  dextrose 5%. 1000 milliLiter(s) IV Continuous <Continuous>  ferrous    sulfate 325 milliGRAM(s) Oral daily  multivitamin 1 Tablet(s) Oral daily      FAMILY HISTORY:  FH: stroke (Father)        SOCIAL HISTORY:      [-] Smoker  [-] Alcohol  [-] Drugs    Allergies    No Known Allergies    Intolerances    	    REVIEW OF SYSTEMS:  CONSTITUTIONAL: No fever, weight loss, or fatigue  EYES: No eye pain, visual disturbances, or discharge  ENMT:  No difficulty hearing, tinnitus, vertigo; No sinus or throat pain  NECK: No pain or stiffness  RESPIRATORY: No cough, wheezing, chills or hemoptysis; No Shortness of Breath  CARDIOVASCULAR: No chest pain, palpitations, passing out, dizziness, or leg swelling  GASTROINTESTINAL: No abdominal or epigastric pain. No nausea, vomiting, or hematemesis; No diarrhea or constipation. No melena or hematochezia.  GENITOURINARY: No dysuria, frequency, hematuria, or incontinence  NEUROLOGICAL: No headaches, memory loss, loss of strength, numbness, or tremors  SKIN: No itching, burning, rashes, or lesions   LYMPH Nodes: No enlarged glands  ENDOCRINE: No heat or cold intolerance; No hair loss  MUSCULOSKELETAL: No joint pain or swelling; No muscle, back, or extremity pain  PSYCHIATRIC: No depression, anxiety, mood swings, or difficulty sleeping  HEME/LYMPH: No easy bruising, or bleeding gums  ALLERY AND IMMUNOLOGIC: No hives or eczema	    [x] All others negative	  [ ] Unable to obtain    PHYSICAL EXAM:  T(C): 36.2 (03-26-24 @ 05:57), Max: 36.9 (03-25-24 @ 18:58)  HR: 58 (03-26-24 @ 05:57) (48 - 67)  BP: 181/83 (03-26-24 @ 05:57) (124/62 - 184/69)  RR: 18 (03-26-24 @ 05:57) (16 - 19)  SpO2: 98% (03-26-24 @ 05:57) (98% - 100%)  Wt(kg): --  I&O's Summary      Appearance: Normal	  Cardiovascular: Normal S1 S2, No JVD, No murmurs, No edema  Respiratory: Lungs clear to auscultation	  Psychiatry: A & O x 3, Mood & affect appropriate  Gastrointestinal:  Soft, Non-tender, + BS	  Extremities: Normal range of motion, No clubbing, cyanosis or edema      TELEMETRY: Sinus rhythm with HR 50s-70s	    ECG:  	Sinus rhythm with HR 48 bpm; Josefina 172 ms; qrs 78 ms; Qtc 439 ms  RADIOLOGY:  OTHER: 	  	  LABS:	 	    CARDIAC MARKERS:                        9.8    7.19  )-----------( 130      ( 25 Mar 2024 17:19 )             32.5     03-25    140  |  103  |  47<H>  ----------------------------<  123<H>  4.5   |  22  |  3.36<H>    Ca    9.3      25 Mar 2024 23:19  Phos  4.9     03-25  Mg     2.20     03-25    TPro  8.1  /  Alb  4.4  /  TBili  0.4  /  DBili  x   /  AST  24  /  ALT  20  /  AlkPhos  395<H>  03-25    proBNP:   Lipid Profile:   HgA1c:   TSH: Thyroid Stimulating Hormone, Serum: 6.24 uIU/mL (03-25 @ 17:19)

## 2024-03-26 NOTE — CONSULT NOTE ADULT - PROBLEM SELECTOR RECOMMENDATION 9
Pt is ESRD on HD MWF. Last outpatient iHD was on Sat, 3/23 (extra session) via permacath for volume overload. Patient now here from cardiologist office for bradycardia. HD consent obtained and placed in chart. Will plan for iHD today with 3L fluid removal. Phos was 4.9 on 3/25, c/w home phos binder.

## 2024-03-26 NOTE — ED ADULT NURSE REASSESSMENT NOTE - NS ED NURSE REASSESS COMMENT FT1
Received report from day RN. Pt resting comfortably in bed, NSR on cardiac monitor. Pt endorsing no complaints at this time. Awaiting bed assignment
Report given to CDU DANIEL Bernardo. Pt resting comfortably in bed, respirations are even and unlabored. Pt to be transported after FS
Upon checking pt FS, pt is noted to be hypoglycemic, pt is mentating well A&Ox4. Pt refusing to received IV dextrose. Pt provided with apple juice and plate of food. Respirations are even and unlabored.
Strong peripheral pulses

## 2024-03-26 NOTE — CONSULT NOTE ADULT - ASSESSMENT
Sinus bradycardia  asymptomatic  unclear cause   monitor on tele   EP consult called  obtain echo     HTN   not well controlled   change hydralazine from qd to 50 mg tid     DM II  Monitor finger stick. Insulin coverage. Diabetic education and Diabetic diet. Consider nutrition consultation.    history of cardiac tamponade   obtain echo     Pre-Operative Cardiac Risk Stratification and Optimization prior to avg  will get echo and stress test   please call vascular surgery Dr SMITH service for consideration of AVG placement while inpt      Advanced care planning was discussed with patient and family.  Risks, benefits and alternatives of the cardiac treatments and medical therapy including procedures were discussed in detail and all questions were answered. Importance of compliance with medical therapy and lifestyle modification to improve cardiovascular health were addressed. Appropriate forms and patient educational materials were reviewed. 30 minutes face to face spent.   Sinus bradycardia  asymptomatic  unclear cause   recheck TFT  monitor on tele   EP consult called  obtain echo     HTN   not well controlled   change hydralazine from qd to 50 mg tid     DM II  Monitor finger stick. Insulin coverage. Diabetic education and Diabetic diet. Consider nutrition consultation.    history of cardiac tamponade   obtain echo     Pre-Operative Cardiac Risk Stratification and Optimization prior to avg  will get echo and stress test   please call vascular surgery Dr SMITH service for consideration of AVG placement while inpt      Advanced care planning was discussed with patient and family.  Risks, benefits and alternatives of the cardiac treatments and medical therapy including procedures were discussed in detail and all questions were answered. Importance of compliance with medical therapy and lifestyle modification to improve cardiovascular health were addressed. Appropriate forms and patient educational materials were reviewed. 30 minutes face to face spent.

## 2024-03-27 LAB
ALBUMIN SERPL ELPH-MCNC: 3.9 G/DL — SIGNIFICANT CHANGE UP (ref 3.3–5)
ALP SERPL-CCNC: 375 U/L — HIGH (ref 40–120)
ALT FLD-CCNC: 21 U/L — SIGNIFICANT CHANGE UP (ref 4–33)
ANION GAP SERPL CALC-SCNC: 13 MMOL/L — SIGNIFICANT CHANGE UP (ref 7–14)
APPEARANCE UR: CLEAR — SIGNIFICANT CHANGE UP
APTT BLD: 37.9 SEC — HIGH (ref 24.5–35.6)
AST SERPL-CCNC: 23 U/L — SIGNIFICANT CHANGE UP (ref 4–32)
BACTERIA # UR AUTO: NEGATIVE /HPF — SIGNIFICANT CHANGE UP
BASOPHILS # BLD AUTO: 0.02 K/UL — SIGNIFICANT CHANGE UP (ref 0–0.2)
BASOPHILS NFR BLD AUTO: 0.4 % — SIGNIFICANT CHANGE UP (ref 0–2)
BILIRUB SERPL-MCNC: 0.5 MG/DL — SIGNIFICANT CHANGE UP (ref 0.2–1.2)
BILIRUB UR-MCNC: NEGATIVE — SIGNIFICANT CHANGE UP
BUN SERPL-MCNC: 20 MG/DL — SIGNIFICANT CHANGE UP (ref 7–23)
CALCIUM SERPL-MCNC: 9.1 MG/DL — SIGNIFICANT CHANGE UP (ref 8.4–10.5)
CAST: 10 /LPF — HIGH (ref 0–4)
CHLORIDE SERPL-SCNC: 99 MMOL/L — SIGNIFICANT CHANGE UP (ref 98–107)
CO2 SERPL-SCNC: 25 MMOL/L — SIGNIFICANT CHANGE UP (ref 22–31)
COLOR SPEC: YELLOW — SIGNIFICANT CHANGE UP
CREAT SERPL-MCNC: 1.81 MG/DL — HIGH (ref 0.5–1.3)
DIFF PNL FLD: NEGATIVE — SIGNIFICANT CHANGE UP
EGFR: 32 ML/MIN/1.73M2 — LOW
EOSINOPHIL # BLD AUTO: 0.35 K/UL — SIGNIFICANT CHANGE UP (ref 0–0.5)
EOSINOPHIL NFR BLD AUTO: 6.6 % — HIGH (ref 0–6)
FLUAV AG NPH QL: SIGNIFICANT CHANGE UP
FLUBV AG NPH QL: SIGNIFICANT CHANGE UP
GLUCOSE BLDC GLUCOMTR-MCNC: 157 MG/DL — HIGH (ref 70–99)
GLUCOSE BLDC GLUCOMTR-MCNC: 205 MG/DL — HIGH (ref 70–99)
GLUCOSE BLDC GLUCOMTR-MCNC: 247 MG/DL — HIGH (ref 70–99)
GLUCOSE BLDC GLUCOMTR-MCNC: 320 MG/DL — HIGH (ref 70–99)
GLUCOSE BLDC GLUCOMTR-MCNC: 97 MG/DL — SIGNIFICANT CHANGE UP (ref 70–99)
GLUCOSE SERPL-MCNC: 84 MG/DL — SIGNIFICANT CHANGE UP (ref 70–99)
GLUCOSE UR QL: 250 MG/DL
HCT VFR BLD CALC: 32.2 % — LOW (ref 34.5–45)
HGB BLD-MCNC: 9.9 G/DL — LOW (ref 11.5–15.5)
IANC: 3.63 K/UL — SIGNIFICANT CHANGE UP (ref 1.8–7.4)
IMM GRANULOCYTES NFR BLD AUTO: 0.2 % — SIGNIFICANT CHANGE UP (ref 0–0.9)
KETONES UR-MCNC: NEGATIVE MG/DL — SIGNIFICANT CHANGE UP
LEUKOCYTE ESTERASE UR-ACNC: NEGATIVE — SIGNIFICANT CHANGE UP
LYMPHOCYTES # BLD AUTO: 0.87 K/UL — LOW (ref 1–3.3)
LYMPHOCYTES # BLD AUTO: 16.5 % — SIGNIFICANT CHANGE UP (ref 13–44)
MCHC RBC-ENTMCNC: 23.7 PG — LOW (ref 27–34)
MCHC RBC-ENTMCNC: 30.7 GM/DL — LOW (ref 32–36)
MCV RBC AUTO: 77 FL — LOW (ref 80–100)
MONOCYTES # BLD AUTO: 0.4 K/UL — SIGNIFICANT CHANGE UP (ref 0–0.9)
MONOCYTES NFR BLD AUTO: 7.6 % — SIGNIFICANT CHANGE UP (ref 2–14)
MRSA PCR RESULT.: SIGNIFICANT CHANGE UP
NEUTROPHILS # BLD AUTO: 3.63 K/UL — SIGNIFICANT CHANGE UP (ref 1.8–7.4)
NEUTROPHILS NFR BLD AUTO: 68.7 % — SIGNIFICANT CHANGE UP (ref 43–77)
NITRITE UR-MCNC: NEGATIVE — SIGNIFICANT CHANGE UP
NRBC # BLD: 0 /100 WBCS — SIGNIFICANT CHANGE UP (ref 0–0)
NRBC # FLD: 0 K/UL — SIGNIFICANT CHANGE UP (ref 0–0)
PH UR: 7 — SIGNIFICANT CHANGE UP (ref 5–8)
PLATELET # BLD AUTO: 127 K/UL — LOW (ref 150–400)
POTASSIUM SERPL-MCNC: 4.1 MMOL/L — SIGNIFICANT CHANGE UP (ref 3.5–5.3)
POTASSIUM SERPL-SCNC: 4.1 MMOL/L — SIGNIFICANT CHANGE UP (ref 3.5–5.3)
PROT SERPL-MCNC: 7.8 G/DL — SIGNIFICANT CHANGE UP (ref 6–8.3)
PROT UR-MCNC: 300 MG/DL
RBC # BLD: 4.18 M/UL — SIGNIFICANT CHANGE UP (ref 3.8–5.2)
RBC # FLD: 19.1 % — HIGH (ref 10.3–14.5)
RBC CASTS # UR COMP ASSIST: 8 /HPF — HIGH (ref 0–4)
REVIEW: SIGNIFICANT CHANGE UP
RSV RNA NPH QL NAA+NON-PROBE: SIGNIFICANT CHANGE UP
S AUREUS DNA NOSE QL NAA+PROBE: DETECTED
SARS-COV-2 RNA SPEC QL NAA+PROBE: SIGNIFICANT CHANGE UP
SODIUM SERPL-SCNC: 137 MMOL/L — SIGNIFICANT CHANGE UP (ref 135–145)
SP GR SPEC: 1.02 — SIGNIFICANT CHANGE UP (ref 1–1.03)
SQUAMOUS # UR AUTO: 1 /HPF — SIGNIFICANT CHANGE UP (ref 0–5)
TSH SERPL-MCNC: 4.62 UIU/ML — HIGH (ref 0.27–4.2)
TSH SERPL-MCNC: 4.64 UIU/ML — HIGH (ref 0.27–4.2)
TSH SERPL-MCNC: 4.66 UIU/ML — HIGH (ref 0.27–4.2)
UROBILINOGEN FLD QL: 0.2 MG/DL — SIGNIFICANT CHANGE UP (ref 0.2–1)
WBC # BLD: 5.28 K/UL — SIGNIFICANT CHANGE UP (ref 3.8–10.5)
WBC # FLD AUTO: 5.28 K/UL — SIGNIFICANT CHANGE UP (ref 3.8–10.5)
WBC UR QL: 3 /HPF — SIGNIFICANT CHANGE UP (ref 0–5)

## 2024-03-27 PROCEDURE — 99231 SBSQ HOSP IP/OBS SF/LOW 25: CPT

## 2024-03-27 PROCEDURE — 99223 1ST HOSP IP/OBS HIGH 75: CPT

## 2024-03-27 PROCEDURE — 99232 SBSQ HOSP IP/OBS MODERATE 35: CPT | Mod: GC

## 2024-03-27 RX ORDER — ERYTHROPOIETIN 10000 [IU]/ML
4000 INJECTION, SOLUTION INTRAVENOUS; SUBCUTANEOUS
Refills: 0 | Status: DISCONTINUED | OUTPATIENT
Start: 2024-03-27 | End: 2024-03-29

## 2024-03-27 RX ADMIN — Medication 50 MILLIGRAM(S): at 21:23

## 2024-03-27 RX ADMIN — HEPARIN SODIUM 5000 UNIT(S): 5000 INJECTION INTRAVENOUS; SUBCUTANEOUS at 05:36

## 2024-03-27 RX ADMIN — Medication 1 DROP(S): at 05:36

## 2024-03-27 RX ADMIN — Medication 30 MILLIGRAM(S): at 05:36

## 2024-03-27 RX ADMIN — Medication 60 MILLIGRAM(S): at 05:35

## 2024-03-27 RX ADMIN — Medication 1: at 09:27

## 2024-03-27 RX ADMIN — Medication 2: at 18:18

## 2024-03-27 RX ADMIN — Medication 2: at 12:38

## 2024-03-27 RX ADMIN — Medication 50 MILLIGRAM(S): at 00:25

## 2024-03-27 RX ADMIN — Medication 325 MILLIGRAM(S): at 12:40

## 2024-03-27 RX ADMIN — Medication 1 TABLET(S): at 12:40

## 2024-03-27 RX ADMIN — Medication 50 MILLIGRAM(S): at 05:35

## 2024-03-27 RX ADMIN — Medication 125 MICROGRAM(S): at 05:36

## 2024-03-27 RX ADMIN — Medication 50 MILLIGRAM(S): at 12:39

## 2024-03-27 NOTE — PROGRESS NOTE ADULT - NS ATTEND AMEND GEN_ALL_CORE FT
58 year old female  HFpEF -EF 60-65% 09/25/23 ( no recent exacerbation ),  mod-severe TR , pulmonary HTN, DM2, HTN, hypothyroid on synthroid  (myxedema coma 1/2023 ), ESRD on dialysis via Sh[facility] sent from cardiologist's office for asymptomatic sinus bradycardia. Patient's HR was in 40s on admission. Currently telemetry shows sinus rhythm with HR 50s when sleeping and 60s-80s at other times. Last echocardiogram from 9/25/23 showed normal LV function. She is currently not on any AV biju blockers. TSH is 4.66

## 2024-03-27 NOTE — PROGRESS NOTE ADULT - NSPROGADDITIONALINFOA_GEN_ALL_CORE
HTN not well controlled, increase Nifedipine to 60mg daily  ESRD through perm Cath. admitted with bradycardia  plan for HD tomorrow.  anemia and HTN   continue EPo  increase Nifedipine  f/u EP regarding bradycardia  AVF creation consult inpatient

## 2024-03-27 NOTE — CONSULT NOTE ADULT - ATTENDING COMMENTS
pt. needs HD access  will plan for AVF creation on this admission once medically optimized  protect L arm
ESRD through perm Cath. admitted with bradycardia  plan for HD today  anemia and HTN   continue with BP meds  start EPo  f/u EP regarding bradycardia
Normal for race

## 2024-03-27 NOTE — CONSULT NOTE ADULT - SUBJECTIVE AND OBJECTIVE BOX
Patient is a 58y old  Female who presents with a chief complaint of bradycardia (27 Mar 2024 12:28)      HPI:  59 yo f  HFpEF -EF 60-65% 09/25/23 ( no recent exacerbation ) , mod-severeTR , pulmonary htn, dm2, htn, hypothyroid on synthroid ( myxedema coma 1/2023 )  esrd on dialysis via shiley. Was at cardiology today for preop optimization prior to left arm fistula creation planned for 3/28/24. Noted to be bradycardic as low as 39, and sent to ed. Pt denies recent lightheadedness,  dizziness cp, or sob. Denies recent low FS. Currently well-appearing with no clinical signs of infection. TSH 6.24. HR at time of my exam 58. earlier ekg  sinus ravinder at 48      Of note, pt was hospitalized 09/20/23 for hypercapnic resp failure likely 2/2 volume overload for urgent HD w/ course c/b obstructive shock in the setting of uremic pericardial tamponade - s/p pericardial drain placement and removal . Last echo 9/25/23 showed left ventricular systolic function is normal with an ejection fraction visually estimated at 60 to 65 %.trace pericardial effusion (25 Mar 2024 19:49)      Vascular surgery consulted for AVF creation      PAST MEDICAL & SURGICAL HISTORY:  HTN (hypertension)      HLD (hyperlipidemia)      DM (diabetes mellitus)      Hypothyroid      H/O pulmonary hypertension      CKD (chronic kidney disease)      (HFpEF) heart failure with preserved ejection fraction      S/P cataract surgery      FAMILY HISTORY:  FH: stroke (Father)        Vital Signs Last 24 Hrs  T(C): 35.1 (27 Mar 2024 10:15), Max: 36.8 (26 Mar 2024 14:00)  T(F): 95.1 (27 Mar 2024 10:15), Max: 98.2 (26 Mar 2024 14:00)  HR: 58 (27 Mar 2024 10:10) (57 - 69)  BP: 150/56 (27 Mar 2024 10:10) (150/56 - 186/70)  BP(mean): --  RR: 18 (27 Mar 2024 10:10) (17 - 18)  SpO2: 98% (27 Mar 2024 10:10) (95% - 100%)    Parameters below as of 27 Mar 2024 10:10  Patient On (Oxygen Delivery Method): room air        PHYSICAL EXAM:  Constitutional: well developed, well nourished, NAD  Eyes: anicteric  ENMT: normal facies, symmetric  Neck: supple  Respiratory: CTA bilat  Cardiovascular: RRR  Gastrointestinal: abdomen soft, nontender, nondistended. No obvious masses. No peritonitis  Extremities: FROM, warm  Neurological: intact, non-focal  Skin: no gross lesions  Lymph Nodes: no gross adenopathy  Musculoskeletal: equal strength bilateral upper and lower extremities  Psychiatric: oriented x 3; appropriate  Rectal:        LABS:                        9.9    5.28  )-----------( 127      ( 27 Mar 2024 06:08 )             32.2     03-27    137  |  99  |  20  ----------------------------<  84  4.1   |  25  |  1.81<H>    Ca    9.1      27 Mar 2024 06:08  Phos  4.9     03-25  Mg     2.20     03-25    TPro  7.8  /  Alb  3.9  /  TBili  0.5  /  DBili  x   /  AST  23  /  ALT  21  /  AlkPhos  375<H>  03-27    PT/INR - ( 26 Mar 2024 10:00 )   PT: 11.1 sec;   INR: 0.99 ratio         PTT - ( 27 Mar 2024 06:08 )  PTT:37.9 sec  Urinalysis Basic - ( 27 Mar 2024 06:08 )    Color: x / Appearance: x / SG: x / pH: x  Gluc: 84 mg/dL / Ketone: x  / Bili: x / Urobili: x   Blood: x / Protein: x / Nitrite: x   Leuk Esterase: x / RBC: x / WBC x   Sq Epi: x / Non Sq Epi: x / Bacteria: x        RADIOLOGY & ADDITIONAL STUDIES:

## 2024-03-27 NOTE — CHART NOTE - NSCHARTNOTEFT_GEN_A_CORE
Patient noted hypothermic. A&Ox4 at baseline. Patient c/o feeling tired without any acute symptoms.   BCx, RVP ordered. Cortisol level ordered and sent. Discussed with Dr. Escalera. Will continue to monitor.  Temperature improved with warming blanket.
Spoke with nurse, patient currently hypothermic with complaints of feeling unwell, placing patient on hugger  Will hold off on stress test today 3/27    Marixa Cohn PA-C  In House ext [phone]
Vascular consult called to Dr. Treviño

## 2024-03-27 NOTE — CONSULT NOTE ADULT - ASSESSMENT
PLAN:  - Will plan for AVF creation  - Please doccument      Dorita Skaggs PGY2  C Team surgery  [id number]   57 yo f  HFpEF -EF 60-65% 09/25/23, mod-severeTR , pulmonary htn, dm2, htn, hypothyroid on synthroid ( myxedema coma 1/2023 )  ESRH on dialysis via Right IJ permacath.     Vascular surgery consulted for AVF creation, Right handed, no Pacemaker. Patient has seeing Dr Treviño as outpatient and was scheduled to have AVF creation on 03/28    PLAN:  - Will plan for AVF creation  - Protec the LUE: no IV, no Blood drawns.   - Please document medical and cardiac optimization / risk stratification      Dorita Skaggs PGY2  C Team surgery  [id number]

## 2024-03-28 ENCOUNTER — RESULT REVIEW (OUTPATIENT)
Age: 59
End: 2024-03-28

## 2024-03-28 ENCOUNTER — APPOINTMENT (OUTPATIENT)
Dept: VASCULAR SURGERY | Facility: HOSPITAL | Age: 59
End: 2024-03-28

## 2024-03-28 LAB
ALBUMIN SERPL ELPH-MCNC: 3.5 G/DL — SIGNIFICANT CHANGE UP (ref 3.3–5)
ALP SERPL-CCNC: 317 U/L — HIGH (ref 40–120)
ALT FLD-CCNC: 16 U/L — SIGNIFICANT CHANGE UP (ref 4–33)
ANION GAP SERPL CALC-SCNC: 15 MMOL/L — HIGH (ref 7–14)
AST SERPL-CCNC: 17 U/L — SIGNIFICANT CHANGE UP (ref 4–32)
BASOPHILS # BLD AUTO: 0.03 K/UL — SIGNIFICANT CHANGE UP (ref 0–0.2)
BASOPHILS NFR BLD AUTO: 0.5 % — SIGNIFICANT CHANGE UP (ref 0–2)
BILIRUB SERPL-MCNC: 0.3 MG/DL — SIGNIFICANT CHANGE UP (ref 0.2–1.2)
BUN SERPL-MCNC: 38 MG/DL — HIGH (ref 7–23)
CALCIUM SERPL-MCNC: 8.7 MG/DL — SIGNIFICANT CHANGE UP (ref 8.4–10.5)
CHLORIDE SERPL-SCNC: 99 MMOL/L — SIGNIFICANT CHANGE UP (ref 98–107)
CO2 SERPL-SCNC: 22 MMOL/L — SIGNIFICANT CHANGE UP (ref 22–31)
CREAT SERPL-MCNC: 2.6 MG/DL — HIGH (ref 0.5–1.3)
EGFR: 21 ML/MIN/1.73M2 — LOW
EOSINOPHIL # BLD AUTO: 0.37 K/UL — SIGNIFICANT CHANGE UP (ref 0–0.5)
EOSINOPHIL NFR BLD AUTO: 6.3 % — HIGH (ref 0–6)
GLUCOSE BLDC GLUCOMTR-MCNC: 164 MG/DL — HIGH (ref 70–99)
GLUCOSE BLDC GLUCOMTR-MCNC: 167 MG/DL — HIGH (ref 70–99)
GLUCOSE BLDC GLUCOMTR-MCNC: 167 MG/DL — HIGH (ref 70–99)
GLUCOSE BLDC GLUCOMTR-MCNC: 266 MG/DL — HIGH (ref 70–99)
GLUCOSE BLDC GLUCOMTR-MCNC: 267 MG/DL — HIGH (ref 70–99)
GLUCOSE BLDC GLUCOMTR-MCNC: 305 MG/DL — HIGH (ref 70–99)
GLUCOSE SERPL-MCNC: 163 MG/DL — HIGH (ref 70–99)
HCT VFR BLD CALC: 29.5 % — LOW (ref 34.5–45)
HGB BLD-MCNC: 9.2 G/DL — LOW (ref 11.5–15.5)
IANC: 3.91 K/UL — SIGNIFICANT CHANGE UP (ref 1.8–7.4)
IMM GRANULOCYTES NFR BLD AUTO: 0.3 % — SIGNIFICANT CHANGE UP (ref 0–0.9)
LYMPHOCYTES # BLD AUTO: 1.11 K/UL — SIGNIFICANT CHANGE UP (ref 1–3.3)
LYMPHOCYTES # BLD AUTO: 18.8 % — SIGNIFICANT CHANGE UP (ref 13–44)
MCHC RBC-ENTMCNC: 23.9 PG — LOW (ref 27–34)
MCHC RBC-ENTMCNC: 31.2 GM/DL — LOW (ref 32–36)
MCV RBC AUTO: 76.6 FL — LOW (ref 80–100)
MONOCYTES # BLD AUTO: 0.46 K/UL — SIGNIFICANT CHANGE UP (ref 0–0.9)
MONOCYTES NFR BLD AUTO: 7.8 % — SIGNIFICANT CHANGE UP (ref 2–14)
NEUTROPHILS # BLD AUTO: 3.91 K/UL — SIGNIFICANT CHANGE UP (ref 1.8–7.4)
NEUTROPHILS NFR BLD AUTO: 66.3 % — SIGNIFICANT CHANGE UP (ref 43–77)
NRBC # BLD: 0 /100 WBCS — SIGNIFICANT CHANGE UP (ref 0–0)
NRBC # FLD: 0 K/UL — SIGNIFICANT CHANGE UP (ref 0–0)
PLATELET # BLD AUTO: 128 K/UL — LOW (ref 150–400)
POTASSIUM SERPL-MCNC: 4.3 MMOL/L — SIGNIFICANT CHANGE UP (ref 3.5–5.3)
POTASSIUM SERPL-SCNC: 4.3 MMOL/L — SIGNIFICANT CHANGE UP (ref 3.5–5.3)
PROT SERPL-MCNC: 6.9 G/DL — SIGNIFICANT CHANGE UP (ref 6–8.3)
RBC # BLD: 3.85 M/UL — SIGNIFICANT CHANGE UP (ref 3.8–5.2)
RBC # FLD: 18.8 % — HIGH (ref 10.3–14.5)
SODIUM SERPL-SCNC: 136 MMOL/L — SIGNIFICANT CHANGE UP (ref 135–145)
WBC # BLD: 5.9 K/UL — SIGNIFICANT CHANGE UP (ref 3.8–10.5)
WBC # FLD AUTO: 5.9 K/UL — SIGNIFICANT CHANGE UP (ref 3.8–10.5)

## 2024-03-28 PROCEDURE — 93016 CV STRESS TEST SUPVJ ONLY: CPT | Mod: GC

## 2024-03-28 PROCEDURE — 99232 SBSQ HOSP IP/OBS MODERATE 35: CPT | Mod: GC

## 2024-03-28 PROCEDURE — 78451 HT MUSCLE IMAGE SPECT SING: CPT | Mod: 26

## 2024-03-28 PROCEDURE — 93018 CV STRESS TEST I&R ONLY: CPT | Mod: GC

## 2024-03-28 RX ORDER — CHLORHEXIDINE GLUCONATE 213 G/1000ML
1 SOLUTION TOPICAL DAILY
Refills: 0 | Status: DISCONTINUED | OUTPATIENT
Start: 2024-03-28 | End: 2024-03-29

## 2024-03-28 RX ADMIN — HEPARIN SODIUM 5000 UNIT(S): 5000 INJECTION INTRAVENOUS; SUBCUTANEOUS at 05:43

## 2024-03-28 RX ADMIN — Medication 30 MILLIGRAM(S): at 10:48

## 2024-03-28 RX ADMIN — Medication 1 TABLET(S): at 15:18

## 2024-03-28 RX ADMIN — Medication 125 MICROGRAM(S): at 05:43

## 2024-03-28 RX ADMIN — Medication 1: at 10:47

## 2024-03-28 RX ADMIN — Medication 1 DROP(S): at 05:43

## 2024-03-28 RX ADMIN — Medication 1 DROP(S): at 17:54

## 2024-03-28 RX ADMIN — CHLORHEXIDINE GLUCONATE 1 APPLICATION(S): 213 SOLUTION TOPICAL at 17:55

## 2024-03-28 RX ADMIN — ERYTHROPOIETIN 4000 UNIT(S): 10000 INJECTION, SOLUTION INTRAVENOUS; SUBCUTANEOUS at 08:41

## 2024-03-28 RX ADMIN — Medication 3: at 17:56

## 2024-03-28 RX ADMIN — Medication 50 MILLIGRAM(S): at 15:18

## 2024-03-28 RX ADMIN — Medication 3: at 13:12

## 2024-03-28 RX ADMIN — Medication 60 MILLIGRAM(S): at 10:48

## 2024-03-28 RX ADMIN — Medication 325 MILLIGRAM(S): at 15:18

## 2024-03-28 RX ADMIN — Medication 50 MILLIGRAM(S): at 22:30

## 2024-03-28 NOTE — PROGRESS NOTE ADULT - PROBLEM SELECTOR PLAN 2
Hgb 9.2, below goal for ESRD patient. C/w epo with HD    If you have any questions, please feel free to contact me  Frederic Villareal  Nephrology Fellow  [phone]; Prefer [employer] TEAMS  (After 5pm or on weekends please page the on-call fellow).

## 2024-03-28 NOTE — PROGRESS NOTE ADULT - PROBLEM SELECTOR PLAN 1
Pt is ESRD on HD MWF. Last outpatient iHD was on Sat, 3/23 (extra session) via permacath for volume overload. Patient now here from cardiologist office for bradycardia. Last inpatient iHD was on Tues 3/26. Will plan for HD today. Phos was 4.9 on 3/25, c/w home phos binder. Please check phos with every bmp.    Patient planned for AV access creation this admission with vascular. Awaiting to undergo a stress test.

## 2024-03-29 ENCOUNTER — TRANSCRIPTION ENCOUNTER (OUTPATIENT)
Age: 59
End: 2024-03-29

## 2024-03-29 VITALS
OXYGEN SATURATION: 99 % | RESPIRATION RATE: 16 BRPM | SYSTOLIC BLOOD PRESSURE: 152 MMHG | HEART RATE: 60 BPM | TEMPERATURE: 98 F | DIASTOLIC BLOOD PRESSURE: 69 MMHG

## 2024-03-29 LAB
ANION GAP SERPL CALC-SCNC: 13 MMOL/L — SIGNIFICANT CHANGE UP (ref 7–14)
BUN SERPL-MCNC: 22 MG/DL — SIGNIFICANT CHANGE UP (ref 7–23)
CALCIUM SERPL-MCNC: 8.9 MG/DL — SIGNIFICANT CHANGE UP (ref 8.4–10.5)
CHLORIDE SERPL-SCNC: 100 MMOL/L — SIGNIFICANT CHANGE UP (ref 98–107)
CO2 SERPL-SCNC: 24 MMOL/L — SIGNIFICANT CHANGE UP (ref 22–31)
CREAT SERPL-MCNC: 1.95 MG/DL — HIGH (ref 0.5–1.3)
EGFR: 29 ML/MIN/1.73M2 — LOW
GLUCOSE BLDC GLUCOMTR-MCNC: 110 MG/DL — HIGH (ref 70–99)
GLUCOSE BLDC GLUCOMTR-MCNC: 209 MG/DL — HIGH (ref 70–99)
GLUCOSE SERPL-MCNC: 116 MG/DL — HIGH (ref 70–99)
HBV SURFACE AG SER-ACNC: SIGNIFICANT CHANGE UP
HCT VFR BLD CALC: 31 % — LOW (ref 34.5–45)
HGB BLD-MCNC: 9.7 G/DL — LOW (ref 11.5–15.5)
MAGNESIUM SERPL-MCNC: 1.9 MG/DL — SIGNIFICANT CHANGE UP (ref 1.6–2.6)
MCHC RBC-ENTMCNC: 24.4 PG — LOW (ref 27–34)
MCHC RBC-ENTMCNC: 31.3 GM/DL — LOW (ref 32–36)
MCV RBC AUTO: 78.1 FL — LOW (ref 80–100)
NRBC # BLD: 0 /100 WBCS — SIGNIFICANT CHANGE UP (ref 0–0)
NRBC # FLD: 0 K/UL — SIGNIFICANT CHANGE UP (ref 0–0)
PHOSPHATE SERPL-MCNC: 3.9 MG/DL — SIGNIFICANT CHANGE UP (ref 2.5–4.5)
PLATELET # BLD AUTO: 125 K/UL — LOW (ref 150–400)
POTASSIUM SERPL-MCNC: 4.3 MMOL/L — SIGNIFICANT CHANGE UP (ref 3.5–5.3)
POTASSIUM SERPL-SCNC: 4.3 MMOL/L — SIGNIFICANT CHANGE UP (ref 3.5–5.3)
RBC # BLD: 3.97 M/UL — SIGNIFICANT CHANGE UP (ref 3.8–5.2)
RBC # FLD: 18.8 % — HIGH (ref 10.3–14.5)
SODIUM SERPL-SCNC: 137 MMOL/L — SIGNIFICANT CHANGE UP (ref 135–145)
WBC # BLD: 5.74 K/UL — SIGNIFICANT CHANGE UP (ref 3.8–10.5)
WBC # FLD AUTO: 5.74 K/UL — SIGNIFICANT CHANGE UP (ref 3.8–10.5)

## 2024-03-29 PROCEDURE — 99232 SBSQ HOSP IP/OBS MODERATE 35: CPT | Mod: GC

## 2024-03-29 RX ORDER — HYDRALAZINE HCL 50 MG
0 TABLET ORAL
Refills: 0 | DISCHARGE

## 2024-03-29 RX ORDER — HYDRALAZINE HCL 50 MG
1 TABLET ORAL
Qty: 90 | Refills: 0
Start: 2024-03-29 | End: 2024-04-27

## 2024-03-29 RX ADMIN — CHLORHEXIDINE GLUCONATE 1 APPLICATION(S): 213 SOLUTION TOPICAL at 12:39

## 2024-03-29 RX ADMIN — Medication 1 TABLET(S): at 12:38

## 2024-03-29 RX ADMIN — Medication 125 MICROGRAM(S): at 05:37

## 2024-03-29 RX ADMIN — Medication 30 MILLIGRAM(S): at 05:37

## 2024-03-29 RX ADMIN — Medication 325 MILLIGRAM(S): at 12:38

## 2024-03-29 RX ADMIN — Medication 2: at 12:38

## 2024-03-29 RX ADMIN — Medication 60 MILLIGRAM(S): at 05:36

## 2024-03-29 RX ADMIN — Medication 50 MILLIGRAM(S): at 05:36

## 2024-03-29 RX ADMIN — Medication 50 MILLIGRAM(S): at 12:38

## 2024-03-29 RX ADMIN — Medication 1 DROP(S): at 05:35

## 2024-03-29 NOTE — DISCHARGE NOTE PROVIDER - NSDCCPCAREPLAN_GEN_ALL_CORE_FT
PRINCIPAL DISCHARGE DIAGNOSIS  Diagnosis: Asymptomatic bradycardia  Assessment and Plan of Treatment: You were evaluated by cardiology and underwent testing   You are cleared for discharge per your cardiologist  You need to follow up with Dr. Valencia within 2 weeks      SECONDARY DISCHARGE DIAGNOSES  Diagnosis: ESRD on dialysis  Assessment and Plan of Treatment: Please continue to follow your dialysis schedule and refer to your primary provider/nephrologist for further care and monitoring of kidney function and electrolytes. Continue a renal restricted diet (Avoiding foods high in potassium and phosphorus), your prescribed medications, and supplementations as directed.  YOUR ARE SCHEDULED FOR HAVE A AV FISTULA PLACED WITH THE VASCULAR TEAM AT [facility] ON TUESDAY 4/2/24      Diagnosis: Hypothyroid  Assessment and Plan of Treatment: Continue synthroid regimen as ordered.  Monitor for any symptoms of decreased or elevated thyroid function.      Diagnosis: DM (diabetes mellitus)  Assessment and Plan of Treatment: Continue consistent carbohydrate diet.  Monitor blood glucose levels throughout the day before meals and at bedtime.  Record blood sugars and bring to outpatient providers appointment in order to be reviewed by your doctor for management modifications.  Be aware of diabetes management symptoms including feeling cool and clammy may be related to low glucose levels.  Feeling hot and dry may indicate high glucose levels.  If  you feel these symptoms, check your blood sugar.  Make regular podiatry appointments in order to have feet checked for wounds and toe nails cut by a doctor to prevent infections.

## 2024-03-29 NOTE — DISCHARGE NOTE PROVIDER - HOSPITAL COURSE
57 yo f with asymptomatic bradycardia        Bradycardia.  -bradycardia unlikely explained by mild degree of hypothyroidism but await complete tft panel. No evidence of recent hypoglycemia or current infection  -c/w tele; tte ordered  - EP consult appreciated   - no further interventions needed     ESRD on dialysis. -typically dialyzed  m/w/f but last  dialyzed  Sat for  "extra fluid removal" . pt did not get dialyzed yet today, but appears euvolemic; Mentating well, vitals stable  -c/w bp meds and torsemide (pt still voids)   - renal consult for HD   - AVF pending for tuesday     Hypothyroid  -c/w synthroid 125     Problem/Plan - 4:  ·  Problem: DM (diabetes mellitus).   ·  Plan: ISS.    dc planning for home with outpatient HD - with planning for outpatient AVF

## 2024-03-29 NOTE — PROGRESS NOTE ADULT - PROVIDER SPECIALTY LIST ADULT
Vascular Surgery
Electrophysiology
Hospitalist
Cardiology
Hospitalist
Hospitalist
Nephrology
Vascular Surgery
Cardiology
Cardiology
Hospitalist
Nephrology
Nephrology

## 2024-03-29 NOTE — DISCHARGE NOTE PROVIDER - NSDCMRMEDTOKEN_GEN_ALL_CORE_FT
ferrous sulfate 325 mg (65 mg elemental iron) oral tablet: 1 tab(s) orally once a day  hydrALAZINE 50 mg oral tablet: 1 tab(s) orally 3 times a day  levothyroxine 125 mcg (0.125 mg) oral tablet: 1 tab(s) orally once a day in the morning on an empty stomach  Multiple Vitamins oral tablet: 1 tab(s) orally once a day  NIFEdipine 60 mg oral tablet, extended release: 0.5 tab(s) orally once a day in the morning (30mg orally in the morning)  Refresh ophthalmic solution: 1 drop(s) in each eye 2 times a day  repaglinide 1 mg oral tablet: 2 tab(s) orally 3 times a day (with meals)  Soaanz 60 mg oral tablet: 1 tab(s) orally once a day in the morning  Vitamin D3 50 mcg (2000 intl units) oral tablet: 1 tab(s) orally once a day

## 2024-03-29 NOTE — DISCHARGE NOTE PROVIDER - CARE PROVIDER_API CALL
[facility]  [address] [place] [address]  Phone: [phone]  Fax: [phone]  Follow Up Time:

## 2024-03-29 NOTE — PROGRESS NOTE ADULT - PROBLEM SELECTOR PLAN 2
Hgb 9.2, below goal for ESRD patient. C/w epo with HD    If you have any questions, please feel free to contact me  Frederic Villareal  Nephrology Fellow  [phone]; Prefer [employer] TEAMS  (After 5pm or on weekends please page the on-call fellow). Hgb 9.7, below goal for ESRD patient. C/w epo with HD

## 2024-03-29 NOTE — DISCHARGE NOTE NURSING/CASE MANAGEMENT/SOCIAL WORK - PATIENT PORTAL LINK FT
You can access the FollowMyHealth Patient Portal offered by [facility] by registering at the following website: [email or web address]. By joining [facility]’s FollowMyHealth portal, you will also be able to view your health information using other applications (apps) compatible with our system.

## 2024-03-29 NOTE — DISCHARGE NOTE NURSING/CASE MANAGEMENT/SOCIAL WORK - NSCORESITESY/N_GEN_A_CORE_RD
No
02-13    136  |  98  |  10.1  ----------------------------<  108<H>  5.9<H>   |  26.0  |  0.70    Ca    9.2      13 Feb 2024 08:47  Phos  3.6     02-13  Mg     2.0     02-13    TPro  6.3<L>  /  Alb  3.8  /  TBili  0.7  /  DBili  0.2  /  AST  13  /  ALT  35  /  AlkPhos  87  02-12  POCT Blood Glucose.: 109 mg/dL (02-13-24 @ 08:42)  A1C with Estimated Average Glucose Result: 5.8 % (12-23-23 @ 02:45)

## 2024-03-29 NOTE — PROGRESS NOTE ADULT - REASON FOR ADMISSION
bradycardia

## 2024-03-29 NOTE — DISCHARGE NOTE NURSING/CASE MANAGEMENT/SOCIAL WORK - NSDCCRNAME_GEN_ALL_CORE_FT
[facility]   [address] [address]   Phone: [phone]  Fax: [phone]

## 2024-03-29 NOTE — PROGRESS NOTE ADULT - ATTENDING COMMENTS
sinus bradycardia   s/p HD today   stress test ongoing. she finished the first  part   pending fistula placement  plan for HD on Sat.
sinus bradycardia   s/p HD today   stress test ongoing. she finished the first  part   pending fistula placement  plan for HD on Sat.

## 2024-03-29 NOTE — PROGRESS NOTE ADULT - ASSESSMENT
57 yo f with asymptomatic bradycardia         Problem/Plan - 1:  ·  Problem: Bradycardia.   ·  Plan: -bradycardia unlikely explained by mild degree of hypothyroidism but await complete tft panel. No evidence of recent hypoglycemia or current infection  -c/w tele; tte ordered  - EP consult appreciated   - no further interventions needed     Problem/Plan - 2:  ·  Problem: ESRD on dialysis.   ·  Plan: -typically dialyzed  m/w/f but last  dialyzed  Sat for  "extra fluid removal" . pt did not get dialyzed yet today, but appears euvolemic; Mentating well, vitals stable  -c/w bp meds and torsemide (pt still voids)   - renal consult for HD   - AVF pending     Problem/Plan - 3:  ·  Problem: Hypothyroid.   ·  Plan: -c/w synthroid 125    Problem/Plan - 4:  ·  Problem: DM (diabetes mellitus).   ·  Plan: ISS.        
59 yo f with asymptomatic bradycardia         Problem/Plan - 1:  ·  Problem: Bradycardia.   ·  Plan: -bradycardia unlikely explained by mild degree of hypothyroidism but await complete tft panel. No evidence of recent hypoglycemia or current infection  -c/w tele; tte ordered  - EP consult appreciated   - no further interventions needed     Problem/Plan - 2:  ·  Problem: ESRD on dialysis.   ·  Plan: -typically dialyzed  m/w/f but last  dialyzed  Sat for  "extra fluid removal" . pt did not get dialyzed yet today, but appears euvolemic; Mentating well, vitals stable  -c/w bp meds and torsemide (pt still voids)   - renal consult for HD   - AVF pending     Problem/Plan - 3:  ·  Problem: Hypothyroid.   ·  Plan: -c/w synthroid 125    Problem/Plan - 4:  ·  Problem: DM (diabetes mellitus).   ·  Plan: ISS.
59 yo f  HFpEF -EF 60-65% 09/25/23, mod-severeTR , pulmonary htn, dm2, htn, hypothyroid on synthroid ( myxedema coma 1/2023 )  ESRH on dialysis via Right IJ permacath.     Vascular surgery consulted for AVF creation, Right handed, no Pacemaker. Patient has seeing Dr Treviño as outpatient and was scheduled to have AVF creation on 03/28    PLAN:  - Will plan for AVF creation  - Protec the LUE: no IV, no Blood drawns.   - Please document medical and cardiac optimization / risk stratification  - rest of car per surgery      C Team surgery  [id number]
Pt on ESRD on HD TIW
Pt on ESRD on HD TIW
Sinus bradycardia  asymptomatic  normal T4  Ep input appreciated   monitor on tele     HTN   cont current meds      DM II  Monitor finger stick. Insulin coverage. Diabetic education and Diabetic diet. Consider nutrition consultation.    history of cardiac tamponade   s/p drain and resolved     Pre-Operative Cardiac Risk Stratification and Optimization prior to avg  awaiting stress test   fu with vascular surgery Dr SMITH service for consideration of AVG placement while inpt     
Sinus bradycardia  asymptomatic  normal T4  Ep input appreciated   monitor on tele     HTN   cont current meds      DM II  Monitor finger stick. Insulin coverage. Diabetic education and Diabetic diet. Consider nutrition consultation.    history of cardiac tamponade   s/p drain and resolved     Pre-Operative Cardiac Risk Stratification and Optimization prior to avg  awaiting stress test   please call vascular surgery Dr SMITH service for consideration of AVG placement while inpt      Advanced care planning was discussed with patient and family.  Risks, benefits and alternatives of the cardiac treatments and medical therapy including procedures were discussed in detail and all questions were answered. Importance of compliance with medical therapy and lifestyle modification to improve cardiovascular health were addressed. Appropriate forms and patient educational materials were reviewed. 30 minutes face to face spent.  
57 yo f with asymptomatic bradycardia         Problem/Plan - 1:  ·  Problem: Bradycardia.   ·  Plan: -bradycardia unlikely explained by mild degree of hypothyroidism but await complete tft panel. No evidence of recent hypoglycemia or current infection  -c/w tele; tte ordered  - EP consult    Problem/Plan - 2:  ·  Problem: ESRD on dialysis.   ·  Plan: -typically dialyzed  m/w/f but last  dialyzed  Sat for  "extra fluid removal" . pt did not get dialyzed yet today, but appears euvolemic; Mentating well, vitals stable  -c/w bp meds and torsemide (pt still voids)   - renal consult for HD     Problem/Plan - 3:  ·  Problem: Hypothyroid.   ·  Plan: -c/w synthroid 125    Problem/Plan - 4:  ·  Problem: DM (diabetes mellitus).   ·  Plan: ISS.    Problem/Plan - 5:  ·  Problem: Encounter for deep vein thrombosis (DVT) prophylaxis.   ·  Plan: -duplex ordered  as left leg appears >right  -sc heparin for now.    
57 yo f with asymptomatic bradycardia         Problem/Plan - 1:  ·  Problem: Bradycardia.   ·  Plan: -bradycardia unlikely explained by mild degree of hypothyroidism but await complete tft panel. No evidence of recent hypoglycemia or current infection  -c/w tele; tte ordered  - EP consult appreciated   - no further interventions needed     Problem/Plan - 2:  ·  Problem: ESRD on dialysis.   ·  Plan: -typically dialyzed  m/w/f but last  dialyzed  Sat for  "extra fluid removal" . pt did not get dialyzed yet today, but appears euvolemic; Mentating well, vitals stable  -c/w bp meds and torsemide (pt still voids)   - renal consult for HD   - AVF pending for tuesday     Problem/Plan - 3:  ·  Problem: Hypothyroid.   ·  Plan: -c/w synthroid 125    Problem/Plan - 4:  ·  Problem: DM (diabetes mellitus).   ·  Plan: ISS.    dc planning 
58 year old female  HFpEF -EF 60-65% 09/25/23 ( no recent exacerbation ),  mod-severe TR , pulmonary HTN, DM2, HTN, hypothyroid on synthroid  (myxedema coma 1/2023 ), ESRD on dialysis via shiley sent from cardiologist's office for asymptomatic sinus bradycardia. Patient's HR was in 40s on admission. Currently telemetry shows sinus rhythm with HR 50s when sleeping and 60s-80s at other times. Last echocardiogram from 9/25/23 showed normal LV function. She is currently not on any AV biju blockers. TSH is 4.66  - Continue to monitor on telemetry while admitted  - Maintain K>4 and Mg>2  - Continue management as per primary team  - No pacing indications at this time  - EP will sign off
57 yo f  HFpEF -EF 60-65% 09/25/23, mod-severeTR , pulmonary htn, dm2, htn, hypothyroid on synthroid ( myxedema coma 1/2023 )  ESRH on dialysis via Right IJ permacath.     Vascular surgery consulted for AVF creation, Right handed, no Pacemaker. Patient has seeing Dr Treviño as outpatient and was scheduled to have AVF creation on 03/28    PLAN:  - AVF creation planned for TUESDAY (4/2/2024)  - Protec the LUE: no IV, no Blood drawns.   - cardiac clearance documented  - Please document medical clearance  - As long as patient is medically cleared, patient can either be discharged and come in on Tuesday for AVFistula creation (going straight to pre-op area) or patient can stay inpatient until procedure (Dispo per primary team)  - rest of car per surgery      C Team surgery  [id number]
Sinus bradycardia  asymptomatic  normal T4  Ep input appreciated   monitor on tele     HTN   cont current meds      DM II  Monitor finger stick. Insulin coverage. Diabetic education and Diabetic diet. Consider nutrition consultation.    history of cardiac tamponade   s/p drain and resolved     Pre-Operative Cardiac Risk Stratification and Optimization prior to avg  normal stress test   CV stable to proceed     
Pt on ESRD on HD TIW
no

## 2024-03-29 NOTE — DISCHARGE NOTE PROVIDER - NSDCFUSCHEDAPPT_GEN_ALL_CORE_FT
[facility]  [address]   Scheduled Appointment: 04/30/2024    Sudarshan Rick  [facility]  [address]   Scheduled Appointment: 04/30/2024    Kamryn Mccarty  [facility]  [address] [facility] [address]  Scheduled Appointment: 04/30/2024    [facility]  [address] [facility]   Scheduled Appointment: 04/30/2024

## 2024-03-30 LAB
CULTURE RESULTS: ABNORMAL
SPECIMEN SOURCE: SIGNIFICANT CHANGE UP

## 2024-04-01 ENCOUNTER — TRANSCRIPTION ENCOUNTER (OUTPATIENT)
Age: 59
End: 2024-04-01

## 2024-04-01 LAB
CULTURE RESULTS: SIGNIFICANT CHANGE UP
SPECIMEN SOURCE: SIGNIFICANT CHANGE UP

## 2024-04-01 NOTE — ASU PATIENT PROFILE, ADULT - FALL HARM RISK - UNIVERSAL INTERVENTIONS
Bed in lowest position, wheels locked, appropriate side rails in place/Call bell, personal items and telephone in reach/Instruct patient to call for assistance before getting out of bed or chair/Non-slip footwear when patient is out of bed/[place] to call system/Physically safe environment - no spills, clutter or unnecessary equipment/Purposeful Proactive Rounding/Room/bathroom lighting operational, light cord in reach

## 2024-04-02 ENCOUNTER — OUTPATIENT (OUTPATIENT)
Dept: OUTPATIENT SERVICES | Facility: HOSPITAL | Age: 59
LOS: 1 days | Discharge: HOME | End: 2024-04-02
Payer: COMMERCIAL

## 2024-04-02 ENCOUNTER — TRANSCRIPTION ENCOUNTER (OUTPATIENT)
Age: 59
End: 2024-04-02

## 2024-04-02 VITALS
DIASTOLIC BLOOD PRESSURE: 59 MMHG | TEMPERATURE: 98 F | SYSTOLIC BLOOD PRESSURE: 136 MMHG | OXYGEN SATURATION: 98 % | RESPIRATION RATE: 18 BRPM | HEIGHT: 62 IN | WEIGHT: 125 LBS | HEART RATE: 61 BPM

## 2024-04-02 VITALS
OXYGEN SATURATION: 98 % | HEART RATE: 58 BPM | DIASTOLIC BLOOD PRESSURE: 56 MMHG | RESPIRATION RATE: 14 BRPM | SYSTOLIC BLOOD PRESSURE: 141 MMHG

## 2024-04-02 DIAGNOSIS — Z98.49 CATARACT EXTRACTION STATUS, UNSPECIFIED EYE: Chronic | ICD-10-CM

## 2024-04-02 DIAGNOSIS — N18.6 END STAGE RENAL DISEASE: ICD-10-CM

## 2024-04-02 PROCEDURE — 36821 AV FUSION DIRECT ANY SITE: CPT | Mod: LT

## 2024-04-02 DEVICE — LIGATING CLIPS WECK HORIZON SMALL-WIDE (RED) 24: Type: IMPLANTABLE DEVICE | Site: LEFT | Status: FUNCTIONAL

## 2024-04-02 DEVICE — LIGATING CLIPS WECK HORIZON MEDIUM (BLUE) 24: Type: IMPLANTABLE DEVICE | Site: LEFT | Status: FUNCTIONAL

## 2024-04-02 RX ORDER — HYDROMORPHONE HYDROCHLORIDE 2 MG/ML
0.5 INJECTION INTRAMUSCULAR; INTRAVENOUS; SUBCUTANEOUS
Refills: 0 | Status: DISCONTINUED | OUTPATIENT
Start: 2024-04-02 | End: 2024-04-02

## 2024-04-02 RX ORDER — FENTANYL CITRATE 50 UG/ML
25 INJECTION INTRAVENOUS
Refills: 0 | Status: DISCONTINUED | OUTPATIENT
Start: 2024-04-02 | End: 2024-04-02

## 2024-04-02 RX ORDER — OXYCODONE HYDROCHLORIDE 5 MG/1
1 TABLET ORAL
Qty: 5 | Refills: 0
Start: 2024-04-02

## 2024-04-02 RX ADMIN — SODIUM CHLORIDE 30 MILLILITER(S): 9 INJECTION INTRAMUSCULAR; INTRAVENOUS; SUBCUTANEOUS at 10:33

## 2024-04-02 NOTE — ASU PREOP CHECKLIST - COMMENTS
Grace Wheeler said that they are scheduling for screenings now. Just call and set him up an appt. If I need to do an order let me know. meds this am 0630 with a sip of water

## 2024-04-02 NOTE — ASU PREOP CHECKLIST - WEIGHT IN KG
VS WDL on 3/4L. FiO2 23-30%, One a/b spell requiring tactile stim/increased oxygen to resolve. N-pass score less than 3. Tolerating gavage feedings over 45 min. Mom updated over phone. Continue to monitor    56.7

## 2024-04-02 NOTE — ASU DISCHARGE PLAN (ADULT/PEDIATRIC) - NURSING INSTRUCTIONS
Please report any signs and symptoms of infection including Fever (Temp >101 or >100.4 if GYN procedure), uncontrollable nausea, vomiting, diarrhea, chills & inability to urinate. Shower with soap & water when appropriate, pat dry with clean towel & no ointments, creams, powders or lotions on incisions unless okayed by MD. Please report any puss or increased drainage from incision sites, or if redness develops and spreads around sites. Please practice good hand hygiene especially after using the bathroom. Follow up with all MD appointments and take medication(s) as prescribed   DO NOT take any Tylenol (Acetaminophen) or narcotics containing Tylenol until after 8:30 pm on 4/2/24 . You received Tylenol during your operation and it can cause damage to your liver if too much is taken within a 24 hour time period.

## 2024-04-02 NOTE — ASU DISCHARGE PLAN (ADULT/PEDIATRIC) - CARE PROVIDER_API CALL
Xenia Treviño  Vascular Surgery  [address], [address]  [place] [address]  Phone: [phone]  Fax: [phone]  Follow Up Time: 2 weeks

## 2024-04-02 NOTE — ASU DISCHARGE PLAN (ADULT/PEDIATRIC) - FOLLOW UP APPOINTMENTS
may also call Recovery Room (PACU) 24/7 @ [phone]/[facility]

## 2024-04-04 LAB — CORTIS SERPL-MCNC: 8.1 UG/DL — SIGNIFICANT CHANGE UP

## 2024-04-17 NOTE — PROVIDER CONTACT NOTE (CRITICAL VALUE NOTIFICATION) - ASSESSMENT
LOV 5/9/23  RTO 6 months  LRF 5/9/23    Health Maintenance   Topic Date Due    Hepatitis B vaccine (1 of 3 - 3-dose series) Never done    COVID-19 Vaccine (1) Never done    Cervical cancer screen  Never done    Shingles vaccine (1 of 2) Never done    Colorectal Cancer Screen  07/30/2019    Breast cancer screen  02/04/2023    DTaP/Tdap/Td vaccine (2 - Td or Tdap) 10/31/2023    Depression Screen  05/09/2024    Flu vaccine (Season Ended) 08/01/2024    Lipids  06/13/2028    Hepatitis C screen  Completed    HIV screen  Completed    Hepatitis A vaccine  Aged Out    Hib vaccine  Aged Out    Polio vaccine  Aged Out    Meningococcal (ACWY) vaccine  Aged Out    Pneumococcal 0-64 years Vaccine  Aged Out    Diabetes screen  Discontinued             (applicable per patient's age: Cancer Screenings, Depression Screening, Fall Risk Screening, Immunizations)    Hemoglobin A1C (%)   Date Value   04/26/2022 5.2     LDL Cholesterol (mg/dL)   Date Value   06/13/2023 149 (H)     AST (U/L)   Date Value   12/05/2023 18     ALT (U/L)   Date Value   12/05/2023 13     BUN (mg/dL)   Date Value   12/05/2023 15      (goal A1C is < 7)   (goal LDL is <100) need 30-50% reduction from baseline     BP Readings from Last 3 Encounters:   12/05/23 (!) 140/92   05/09/23 130/80   03/14/23 (!) 148/87    (goal /80)      All Future Testing planned in [facility]:  Lab Frequency Next Occurrence   H[facility]RIQUE DIGITAL DIAGNOSTIC W OR WO CAD BILATERAL Once 05/09/2023   CBC with Auto Differential     Comprehensive Metabolic Panel         Next Visit Date:  Future Appointments   Date Time Provider Department Center   6/4/2024 10:00 AM Karen Anthony MD [facility]            Patient Active Problem List:     Chronic non-seasonal allergic rhinitis     Wears dentures     Family history of breast cancer in sister     Abnormal bruising     Acute ITP (HCC)     Thyroid enlargement     History of ITP     Mixed hyperlipidemia     Essential hypertension     
LVM for patient to call back to schedule appt.   
LVM for patient to schedule.   
Not seen since 5/2023  Needs OV scheduled   
no signs of bleeding noted

## 2024-04-24 NOTE — H&P ADULT - NSHPPOADEEPVENOUSTHROMB_GEN_A_CORE
Neurosurgery Consult     April 24, 2024    Rima Daily, DO  [address] [address]        HPI:   Sidra Longoria is a pleasant 24 year old female here today at the referral of Dary Zee for initial evaluation of back pain. She reports chronic intermittent low back pain that is bilateral across the low back. Pain is present with activity. She is unable to run or walk/stand for too long. She denies radicular pain into her lower extremities. She denies numbness in her lower extremities. She denies weakness in her lower extremities. She was in PT in 2022. She is taking flexeril 3x/day everyday. She also takes tramadol 2x/week as needed. She had L4, L5, SA MBB in 2022 which she reports was not helpful. She also reports she has a diagnosis of scoliosis      Past Medical History:   Diagnosis Date    Anemia          Past Surgical History:   Procedure Laterality Date    Oral surgery procedure             Current Outpatient Medications   Medication Sig Dispense Refill    escitalopram (LEXAPRO) 5 MG tablet Take 1 tablet by mouth daily. 30 tablet 1    valACYclovir HCl (VALTREX PO)       cloNIDine (CATAPRES) 0.2 MG tablet Take 0.2 mg by mouth.      traMADol HCl 100 MG Tab Take 100 mg by mouth as needed.      cyclobenzaprine (FLEXERIL) 5 MG tablet Take 1 tablet by mouth 3 times daily as needed for Muscle spasms. 60 tablet 0     No current facility-administered medications for this visit.       ALLERGIES:  No Known Allergies    Social History     Socioeconomic History    Marital status: Single     Spouse name: Not on file    Number of children: Not on file    Years of education: Not on file    Highest education level: Not on file   Occupational History    Not on file   Tobacco Use    Smoking status: Former     Types: Cigarettes    Smokeless tobacco: Never   Vaping Use    Vaping status: Former   Substance and Sexual Activity    Alcohol use: No    Drug use: Not Currently     Types: Cannabinols,  Marijuana    Sexual activity: Not on file   Other Topics Concern    Not on file   Social History Narrative    Not on file     Social Determinants of Health     Financial Resource Strain: Not on file   Food Insecurity: Not on file   Transportation Needs: Not on file   Physical Activity: Not on file   Stress: Not on file   Social Connections: Not on file   Interpersonal Safety: Not on file         Family History   Problem Relation Age of Onset    Hypertension Mother     Kidney disease Father     Hypertension Father     Patient is unaware of any medical problems Sister          Family Status   Relation Name Status    Mo  Alive    Fa  Alive    Sis  Alive    Bro  Alive         Review of Systems   Constitutional:  Negative for chills and fever.   Respiratory:  Negative for shortness of breath.    Cardiovascular:  Negative for chest pain.   Gastrointestinal: Negative.    Genitourinary: Negative.    Musculoskeletal:  Positive for back pain. Negative for gait problem.   Neurological:  Negative for weakness and numbness.   Psychiatric/Behavioral:  The patient is not nervous/anxious.    All other systems reviewed and are negative.      Visit Vitals  /66 (BP Location: RUE - Right upper extremity, Patient Position: Sitting, Cuff Size: Regular)   Pulse 77   Ht 5' 5.5\" (1.664 m)   Wt 74.8 kg (165 lb)   LMP 03/31/2024 (Exact Date)   SpO2 100%   BMI 27.04 kg/m²         NAD, pleasant and cooperative.  H NCAT       Neurologic Exam:    MENTAL STATUS  She is awake, alert, and oriented to person, place and time.  She follows simple and complex commands.  Language intact to naming, repetition, fluency and comprehension.  Speech intact.  Normal attention.       CRANIAL NERVES  Visual Fields full.   EOM full without dyscongugate gaze, No nystagmus.  No Ptosis   Sternocleidomastoid  and Trapezius Muscles are symmetrical and normal in power.      MOTOR EXAM    LOWER EXTREMITIES:  MUSCLE Power  0-5/5    Right Left   Hip Flex 5 5   Hip Ex  5 5   ABD Hip 5 5   ADD Hip 5 5   Knee Flexion 5 5   Knee Ex 5 5   DF -Foot  5 5   PF - Foot 5 5   Inversion 5 5   Eversion 5 5     REFLEXES      Deep Tendon Reflexes      REFLEX Right Left   Patellar 2+ 2+   Achilles 2+ 2+     Reflexes   Right Left   Thomas Absent Absent   Ankle Clonus  Absent Absent       SENSATION  Intact sensation to light touch    COORDINATION  No tremor at rest, posture, or intention.      GAIT and STANCE  Normal gait and stance.      IMPRESSION:  (M54.50,  G89.29) Chronic bilateral low back pain without sciatica  (primary encounter diagnosis)  Plan: SERVICE TO PHYSICAL THERAPY      RECOMMENDATIONS:     The patient and I discussed her history, exam and prior work up.  We discussed the diagnosis and treatment options.    Sidra Longoria is a pleasant 24 year old female presenting with chronic low back pain. She has a reassuring neurologic exam wihtout findings of weakness or myelopathy however pain limits her desired activity. XR of the lumbar spine is normal also no evidence of scoliosis. Recommend formal physical therapy. If no improvement will return in 6 weeks for re-evaluation. She can continue tramadol and flexeril prescribed by her PCP. She is aware to contact our office with any questions or concerns.    All questions were answered.    Thank you for referring this pleasant patient to the neurosurgical clinic.  If I can be any further assistance please do not hesitate to contact me.    Sincerely,    MITUL Evans  Neurosurgery   [facility]      Greater than 50% of the visit was spent counseling regarding above issues; total time spent 16 minutes.    no

## 2024-04-29 ENCOUNTER — APPOINTMENT (OUTPATIENT)
Dept: VASCULAR SURGERY | Facility: CLINIC | Age: 59
End: 2024-04-29
Payer: COMMERCIAL

## 2024-04-29 VITALS
DIASTOLIC BLOOD PRESSURE: 64 MMHG | BODY MASS INDEX: 23 KG/M2 | WEIGHT: 125 LBS | HEIGHT: 62 IN | HEART RATE: 57 BPM | SYSTOLIC BLOOD PRESSURE: 151 MMHG

## 2024-04-29 PROCEDURE — 99024 POSTOP FOLLOW-UP VISIT: CPT

## 2024-04-29 NOTE — HISTORY OF PRESENT ILLNESS
[FreeTextEntry1] : Pt presents for post op visit. She is s/p left radiocephalic avf on 4/2/2024. Incision c/d/i, healing well. Denies LUE pain, swelling, numbness or tingling. Denies sensory or motor deficits. She is on HD via R IJ permacath M/W/F. No problems with dialysis.

## 2024-04-29 NOTE — ASSESSMENT
[FreeTextEntry1] : Impression - ESRD on HD via R IJ permacath s/p left radiocephalic avf, functioning left avf   Plan Continue HD 3x/week via R IJ permacath  MÓNICA precautions - no BP, IV or blood draw Return to office in 2-3 weeks to evaluate left avf maturity with dialysis ultrasound

## 2024-04-29 NOTE — PHYSICAL EXAM
[2+] : left 2+ [No Rash or Lesion] : No rash or lesion [Alert] : alert [Oriented to Person] : oriented to person [Oriented to Place] : oriented to place [Oriented to Time] : oriented to time [Calm] : calm [de-identified] : NAD [de-identified] : NCAT [de-identified] : unlabored breathing [FreeTextEntry1] : LUE incision c/d/i, healing well no bleeding or drainage good palpable thrill over left avf brisk capillary refill < 2 sec intact sensory or motor function [de-identified] : cooperative

## 2024-04-30 ENCOUNTER — APPOINTMENT (OUTPATIENT)
Dept: NEPHROLOGY | Facility: CLINIC | Age: 59
End: 2024-04-30
Payer: COMMERCIAL

## 2024-04-30 ENCOUNTER — APPOINTMENT (OUTPATIENT)
Dept: TRANSPLANT | Facility: CLINIC | Age: 59
End: 2024-04-30
Payer: COMMERCIAL

## 2024-04-30 ENCOUNTER — NON-APPOINTMENT (OUTPATIENT)
Age: 59
End: 2024-04-30

## 2024-04-30 ENCOUNTER — LABORATORY RESULT (OUTPATIENT)
Age: 59
End: 2024-04-30

## 2024-04-30 VITALS
HEIGHT: 62 IN | WEIGHT: 124 LBS | OXYGEN SATURATION: 95 % | DIASTOLIC BLOOD PRESSURE: 63 MMHG | HEART RATE: 59 BPM | BODY MASS INDEX: 22.82 KG/M2 | TEMPERATURE: 97.5 F | SYSTOLIC BLOOD PRESSURE: 167 MMHG

## 2024-04-30 DIAGNOSIS — E11.9 TYPE 2 DIABETES MELLITUS W/OUT COMPLICATIONS: ICD-10-CM

## 2024-04-30 PROCEDURE — 99205 OFFICE O/P NEW HI 60 MIN: CPT

## 2024-04-30 PROCEDURE — 99204 OFFICE O/P NEW MOD 45 MIN: CPT

## 2024-04-30 RX ORDER — AMLODIPINE BESYLATE 5 MG/1
5 TABLET ORAL DAILY
Qty: 30 | Refills: 5 | Status: DISCONTINUED | COMMUNITY
Start: 2023-07-21 | End: 2024-04-30

## 2024-04-30 NOTE — PHYSICAL EXAM
[General Appearance - Alert] : alert [General Appearance - In No Acute Distress] : in no acute distress [General Appearance - Well Nourished] : well nourished [General Appearance - Well Developed] : well developed [Sclera] : the sclera and conjunctiva were normal [Extraocular Movements] : extraocular movements were intact [Strabismus] : no strabismus was seen [Neck Appearance] : the appearance of the neck was normal [Neck Cervical Mass (___cm)] : no neck mass was observed [Respiration, Rhythm And Depth] : normal respiratory rhythm and effort [Auscultation Breath Sounds / Voice Sounds] : lungs were clear to auscultation bilaterally [Heart Rate And Rhythm] : heart rate was normal and rhythm regular [Heart Sounds] : normal S1 and S2 [Heart Sounds Gallop] : no gallops [Full Pulse] : the pedal pulses are present [Edema] : there was no peripheral edema [Bowel Sounds] : normal bowel sounds [Abdomen Soft] : soft [Abdomen Tenderness] : non-tender [Cervical Lymph Nodes Enlarged Posterior Bilaterally] : posterior cervical [Cervical Lymph Nodes Enlarged Anterior Bilaterally] : anterior cervical [Supraclavicular Lymph Nodes Enlarged Bilaterally] : supraclavicular [Abnormal Walk] : normal gait [Nail Clubbing] : no clubbing  or cyanosis of the fingernails [Musculoskeletal - Swelling] : no joint swelling seen [Skin Color & Pigmentation] : normal skin color and pigmentation [Skin Turgor] : normal skin turgor [] : no rash [Cranial Nerves] : cranial nerves 2-12 were intact [Sensation] : the sensory exam was normal to light touch and pinprick [Motor Exam] : the motor exam was normal [Oriented To Time, Place, And Person] : oriented to person, place, and time [Impaired Insight] : insight and judgment were intact [Affect] : the affect was normal

## 2024-04-30 NOTE — PLAN
[FreeTextEntry1] : 1.  ESRD - Ms. Hernandez is a reasonable candidate for kidney transplantation.  She has no living donors.   2.  DM2 - will check A1c, no longer on insulin. currently on Prandin.  3.  HTN - being managed at dialysis. 4.  CV - Pt had a recent stress and echo.  Both ok.  Has history of pericardial effusion but not on last echo.  5.  Cancer screening - will need colonoscopy, pap smear and mammogram.   I have personally discussed the risks and benefits of transplantation and patient attended transplant education class where the following was disclosed:   Reviewed factors affecting survival and morbidity while on dialysis, the transplant wait list and reviewed sourav-operative and long-term risk factors affecting outcome in kidney transplantation.     One year SRTR outcomes for national and [facility] were discussed in regards to patient survival and graft survival after transplantation.     Details of transplant surgery, including complications were discussed. Immunosuppression and complications including infection including life threatening sepsis and opportunistic infections, malignancy and new onset diabetes were discussed.     Benefits of live donor transplantation as well as variability in wait times across regions and multiple listing were discussed.  KDPI >85% and [facility] high risk criteria donors were discussed.  HCV kidney transplantation was discussed.   Will proceed with completing/ updating work up and listing for transplant/ live donor transplant once work up is reviewed and found to be acceptable by multidisciplinary listing committee.

## 2024-04-30 NOTE — HISTORY OF PRESENT ILLNESS
[Diabetes Mellitus] : Diabetes Mellitus [TextBox_42] : Ms. Hernandez is a 58 y.o female who presents for kidney transplant evaluation.  Pt started dialysis 6 months ago, she has a permacath and AVF.  Her nephrologist is Dr. Coyle.   She has a long standing history of DM2 for 25 years.  She had been on insulin now only on prandin.  She has retinopathy.  She has LE neuropathy, no ulcers or amputations.  She denies CAD.   [facility]   In Sept 2023 she had a pericardiocentesis. March 2024 she had an echo which did not reveal a pericardial effusion and a stress test which was without ischemia.  No CVA.  She can walk a few blocks.  She can walk up stairs but has trouble.   She has hypothyroid.  Urinates a little.    Social history:  No smoking or alcohol.  She is , has 2 children.  surgical history:  2 cataract surgeries and csection.

## 2024-05-02 NOTE — HISTORY OF PRESENT ILLNESS
[Diabetes Mellitus] : Diabetes Mellitus [TextBox_42] : 58-year-old female with ESRD on HD since November 2023 she is a diabetic for over 25 years, she was on insulin now on Prandin diagnosed with high blood pressure about three years ago no CAD she was admitted with volume overload  and in 9-23 she was admitted with pericardial effusion requiring pericardiocentesis cataract surgery retinopathy hypothyroidism on thyroid replacement had a C section once she can walk 2-3 blocks; describes claudication pains after walking; she has to rest and can start walking again.  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

## 2024-05-02 NOTE — PLAN
No [We should proceed with our protocol for kidney transplantation evaluation.] : We should proceed with our protocol for kidney transplantation evaluation.

## 2024-05-06 ENCOUNTER — NON-APPOINTMENT (OUTPATIENT)
Age: 59
End: 2024-05-06

## 2024-05-06 LAB
ABO + RH PNL BLD: NORMAL
ABO + RH PNL BLD: NORMAL
ALBUMIN SERPL ELPH-MCNC: 4.3 G/DL
ALP BLD-CCNC: 576 U/L
ALT SERPL-CCNC: 30 U/L
ANION GAP SERPL CALC-SCNC: 16 MMOL/L
APPEARANCE: CLEAR
AST SERPL-CCNC: 28 U/L
BACTERIA: NEGATIVE /HPF
BASOPHILS # BLD AUTO: 0.03 K/UL
BASOPHILS NFR BLD AUTO: 0.6 %
BILIRUB SERPL-MCNC: 0.6 MG/DL
BILIRUBIN URINE: NEGATIVE
BLOOD URINE: ABNORMAL
BUN SERPL-MCNC: 28 MG/DL
C PEPTIDE SERPL-MCNC: 10.3 NG/ML
CALCIUM SERPL-MCNC: 9.2 MG/DL
CAST: 33 /LPF
CHLORIDE SERPL-SCNC: 99 MMOL/L
CHOLEST SERPL-MCNC: 124 MG/DL
CMV IGG SERPL QL: >10 U/ML
CMV IGG SERPL-IMP: POSITIVE
CO2 SERPL-SCNC: 24 MMOL/L
COLOR: NORMAL
COVID-19 SPIKE DOMAIN ANTIBODY INTERPRETATION: POSITIVE
CREAT SERPL-MCNC: 2.55 MG/DL
CREAT SPEC-SCNC: 84 MG/DL
CREAT/PROT UR: 9.8 RATIO
EBV DNA SERPL NAA+PROBE-ACNC: NOT DETECTED IU/ML
EBV EA AB SER IA-ACNC: 70.5 U/ML
EBV EA AB TITR SER IF: POSITIVE
EBV EA IGG SER QL IA: >600 U/ML
EBV EA IGG SER-ACNC: POSITIVE
EBV EA IGM SER IA-ACNC: NEGATIVE
EBV PATRN SPEC IB-IMP: NORMAL
EBV VCA IGG SER IA-ACNC: 240 U/ML
EBV VCA IGM SER QL IA: <10 U/ML
EBVPCR LOG: NOT DETECTED LOG10IU/ML
EGFR: 21 ML/MIN/1.73M2
EOSINOPHIL # BLD AUTO: 0.3 K/UL
EOSINOPHIL NFR BLD AUTO: 5.7 %
EPITHELIAL CELLS: 3 /HPF
EPSTEIN-BARR VIRUS CAPSID ANTIGEN IGG: POSITIVE
ESTIMATED AVERAGE GLUCOSE: 154 MG/DL
GLUCOSE QUALITATIVE U: 250 MG/DL
GLUCOSE SERPL-MCNC: 253 MG/DL
HAV IGM SER QL: NONREACTIVE
HBA1C MFR BLD HPLC: 7 %
HBV CORE IGG+IGM SER QL: NONREACTIVE
HBV SURFACE AB SER QL: REACTIVE
HBV SURFACE AB SERPL IA-ACNC: 487.1 MIU/ML
HBV SURFACE AG SER QL: NONREACTIVE
HCG SERPL-MCNC: 2 MIU/ML
HCT VFR BLD CALC: 34.4 %
HCV AB SER QL: NONREACTIVE
HCV S/CO RATIO: 0.14 S/CO
HDLC SERPL-MCNC: 41 MG/DL
HGB BLD-MCNC: 10.1 G/DL
HIV1+2 AB SPEC QL IA.RAPID: NONREACTIVE
HSV 1+2 IGG SER IA-IMP: NEGATIVE
HSV 1+2 IGG SER IA-IMP: POSITIVE
HSV1 IGG SER QL: 33.5 INDEX
HSV2 IGG SER QL: 0.3 INDEX
HYALINE CASTS: PRESENT
IMM GRANULOCYTES NFR BLD AUTO: 0.4 %
KETONES URINE: NEGATIVE MG/DL
LDLC SERPL CALC-MCNC: 65 MG/DL
LEUKOCYTE ESTERASE URINE: ABNORMAL
LYMPHOCYTES # BLD AUTO: 0.85 K/UL
LYMPHOCYTES NFR BLD AUTO: 16.2 %
M TB IFN-G BLD-IMP: NEGATIVE
MAGNESIUM SERPL-MCNC: 2.2 MG/DL
MAN DIFF?: NORMAL
MCHC RBC-ENTMCNC: 24.2 PG
MCHC RBC-ENTMCNC: 29.4 GM/DL
MCV RBC AUTO: 82.3 FL
MICROSCOPIC-UA: NORMAL
MONOCYTES # BLD AUTO: 0.37 K/UL
MONOCYTES NFR BLD AUTO: 7 %
NEUTROPHILS # BLD AUTO: 3.68 K/UL
NEUTROPHILS NFR BLD AUTO: 70.1 %
NITRITE URINE: NEGATIVE
NONHDLC SERPL-MCNC: 83 MG/DL
PH URINE: 7.5
PHOSPHATE SERPL-MCNC: 4.6 MG/DL
PLATELET # BLD AUTO: 124 K/UL
POTASSIUM SERPL-SCNC: 4.6 MMOL/L
PROT SERPL-MCNC: 7.3 G/DL
PROT UR-MCNC: 827 MG/DL
PROTEIN URINE: >=1000 MG/DL
QUANTIFERON TB PLUS MITOGEN MINUS NIL: 0.76 IU/ML
QUANTIFERON TB PLUS NIL: 0.01 IU/ML
QUANTIFERON TB PLUS TB1 MINUS NIL: 0 IU/ML
QUANTIFERON TB PLUS TB2 MINUS NIL: 0 IU/ML
RBC # BLD: 4.18 M/UL
RBC # FLD: 20.8 %
RED BLOOD CELLS URINE: 15 /HPF
REVIEW: NORMAL
RUBV IGG FLD-ACNC: 8.6 INDEX
RUBV IGG SER-IMP: POSITIVE
SARS-COV-2 AB SERPL IA-ACNC: >250 U/ML
SODIUM SERPL-SCNC: 139 MMOL/L
SPECIFIC GRAVITY URINE: 1.02
T GONDII AB SER-IMP: POSITIVE
T GONDII IGG SER QL: 190 IU/ML
T PALLIDUM AB SER QL IA: NEGATIVE
TRIGL SERPL-MCNC: 99 MG/DL
URATE SERPL-MCNC: 4.4 MG/DL
UROBILINOGEN URINE: 1 MG/DL
VZV AB TITR SER: POSITIVE
VZV IGG SER IF-ACNC: 1952 INDEX
WBC # FLD AUTO: 5.25 K/UL
WHITE BLOOD CELLS URINE: 1 /HPF

## 2024-05-13 ENCOUNTER — APPOINTMENT (OUTPATIENT)
Dept: VASCULAR SURGERY | Facility: CLINIC | Age: 59
End: 2024-05-13
Payer: COMMERCIAL

## 2024-05-13 VITALS
HEIGHT: 62 IN | HEART RATE: 51 BPM | TEMPERATURE: 97.8 F | WEIGHT: 125 LBS | SYSTOLIC BLOOD PRESSURE: 140 MMHG | DIASTOLIC BLOOD PRESSURE: 53 MMHG | BODY MASS INDEX: 23 KG/M2

## 2024-05-13 PROCEDURE — 93990 DOPPLER FLOW TESTING: CPT

## 2024-05-13 PROCEDURE — 99213 OFFICE O/P EST LOW 20 MIN: CPT

## 2024-05-13 NOTE — ASSESSMENT
[FreeTextEntry1] : Impression - ESRD on HD via R IJ permacath s/p left radiocephalic avf, left avf not mature yet for HD   Plan Continue HD 3x/week via R IJ permacath  MÓNICA precautions - no BP, IV or blood draw d/w pt and son indication, risks, benefits and alternatives to left avf BAM both verbalized understanding, agreement and wish to proceed left avf fistulogram/BAM to be scheduled [Arterial/Venous Disease] : arterial/venous disease [Other: _____] : [unfilled]

## 2024-05-13 NOTE — PHYSICAL EXAM
[2+] : left 2+ [No Rash or Lesion] : No rash or lesion [Alert] : alert [Oriented to Person] : oriented to person [Oriented to Place] : oriented to place [Oriented to Time] : oriented to time [Calm] : calm [de-identified] : NAD [de-identified] : NCAT [de-identified] : unlabored breathing [FreeTextEntry1] : LUE incision well healed good palpable thrill over left avf brisk capillary refill < 2 sec intact sensory or motor function [de-identified] : cooperative

## 2024-05-13 NOTE — DATA REVIEWED
[FreeTextEntry1] : 5/13/2024 LUE dialysis ultrasound patent left avf volume flow 321 ml/min diam prox 4.3 mm, diam mid 3.7 mm, diam distal 4.1 mm

## 2024-05-13 NOTE — HISTORY OF PRESENT ILLNESS
[FreeTextEntry1] : Pt presents for post op visit. She is s/p left radiocephalic avf on 4/2/2024. Incision c/d/i, healing well. Denies LUE pain, swelling, numbness or tingling. Denies sensory or motor deficits. She is on HD via R IJ permacath M/W/F. No problems with dialysis. [de-identified] : 5/13/2024 Pt is here to evaluate left avf maturity. She is s/p left radiocephalic avf on 4/2/24. LUE incision well healed. Denies problems with LUE. On HD via right IJ permacath without issues. Goes to HD M/W/F.

## 2024-05-22 NOTE — PHYSICAL THERAPY INITIAL EVALUATION ADULT - DID THE PATIENT HAVE SURGERY?
Discussed risks and benefits of treatments that can be applied to the skin, topical steroids, TCis. For more severe eczema, treatments include phototheraphy, oral steroids, oral immnosuppressant drugs, NIKOLAI inhibitor, and biologic drug. Patient experiences flares every once in awhile, on every once in a while on other parts of the body. Patient will continue topicals and fill out paperwork for dupixent to see what she would be responsible for copay in regard to dupixent. n/a Detail Level: Zone

## 2024-05-23 ENCOUNTER — TRANSCRIPTION ENCOUNTER (OUTPATIENT)
Age: 59
End: 2024-05-23

## 2024-05-23 ENCOUNTER — APPOINTMENT (OUTPATIENT)
Dept: VASCULAR SURGERY | Facility: HOSPITAL | Age: 59
End: 2024-05-23

## 2024-05-23 ENCOUNTER — OUTPATIENT (OUTPATIENT)
Dept: OUTPATIENT SERVICES | Facility: HOSPITAL | Age: 59
LOS: 1 days | Discharge: HOME | End: 2024-05-23
Payer: COMMERCIAL

## 2024-05-23 VITALS
DIASTOLIC BLOOD PRESSURE: 44 MMHG | OXYGEN SATURATION: 96 % | WEIGHT: 125 LBS | SYSTOLIC BLOOD PRESSURE: 119 MMHG | RESPIRATION RATE: 14 BRPM | HEIGHT: 62 IN | HEART RATE: 57 BPM

## 2024-05-23 VITALS
SYSTOLIC BLOOD PRESSURE: 116 MMHG | RESPIRATION RATE: 16 BRPM | OXYGEN SATURATION: 96 % | HEART RATE: 48 BPM | DIASTOLIC BLOOD PRESSURE: 45 MMHG

## 2024-05-23 DIAGNOSIS — Z98.891 HISTORY OF UTERINE SCAR FROM PREVIOUS SURGERY: Chronic | ICD-10-CM

## 2024-05-23 DIAGNOSIS — N18.6 END STAGE RENAL DISEASE: ICD-10-CM

## 2024-05-23 DIAGNOSIS — I77.0 ARTERIOVENOUS FISTULA, ACQUIRED: Chronic | ICD-10-CM

## 2024-05-23 DIAGNOSIS — Z98.49 CATARACT EXTRACTION STATUS, UNSPECIFIED EYE: Chronic | ICD-10-CM

## 2024-05-23 LAB
GLUCOSE BLDC GLUCOMTR-MCNC: 118 MG/DL — HIGH (ref 70–99)
GLUCOSE BLDC GLUCOMTR-MCNC: 133 MG/DL — HIGH (ref 70–99)

## 2024-05-23 PROCEDURE — 36902 INTRO CATH DIALYSIS CIRCUIT: CPT | Mod: 58

## 2024-05-23 PROCEDURE — 99152 MOD SED SAME PHYS/QHP 5/>YRS: CPT

## 2024-05-23 PROCEDURE — 76937 US GUIDE VASCULAR ACCESS: CPT | Mod: 26

## 2024-05-23 RX ORDER — LEVOTHYROXINE SODIUM 125 MCG
1 TABLET ORAL
Refills: 0 | DISCHARGE

## 2024-05-23 RX ORDER — REPAGLINIDE 1 MG/1
2 TABLET ORAL
Refills: 0 | DISCHARGE

## 2024-05-23 RX ORDER — REPAGLINIDE 1 MG/1
1 TABLET ORAL
Refills: 0 | DISCHARGE

## 2024-05-23 RX ORDER — HYDRALAZINE HCL 50 MG
1.5 TABLET ORAL
Refills: 0 | DISCHARGE

## 2024-05-23 RX ORDER — FERROUS SULFATE 325(65) MG
1 TABLET ORAL
Refills: 0 | DISCHARGE

## 2024-05-23 RX ORDER — CHOLECALCIFEROL (VITAMIN D3) 125 MCG
1 CAPSULE ORAL
Refills: 0 | DISCHARGE

## 2024-05-23 RX ORDER — SODIUM CHLORIDE 9 MG/ML
3 INJECTION INTRAMUSCULAR; INTRAVENOUS; SUBCUTANEOUS EVERY 8 HOURS
Refills: 0 | Status: DISCONTINUED | OUTPATIENT
Start: 2024-05-23 | End: 2024-06-06

## 2024-05-23 RX ORDER — NIFEDIPINE 30 MG
0.5 TABLET, EXTENDED RELEASE 24 HR ORAL
Refills: 0 | DISCHARGE

## 2024-05-23 NOTE — H&P CARDIOLOGY - NSICDXPASTMEDICALHX_GEN_ALL_CORE_FT
PAST MEDICAL HISTORY:  (HFpEF) heart failure with preserved ejection fraction     Anemia of chronic disease     DM (diabetes mellitus)     ESRD (end stage renal disease) on dialysis     HLD (hyperlipidemia)     HTN (hypertension)     Hypothyroidism

## 2024-05-23 NOTE — ASU DISCHARGE PLAN (ADULT/PEDIATRIC) - CARE PROVIDER_API CALL
Xenia Treviño  Vascular Surgery  [address], [address]  [place] [address]  Phone: [phone]  Fax: [phone]  Follow Up Time:

## 2024-05-23 NOTE — ASU DISCHARGE PLAN (ADULT/PEDIATRIC) - ASU DC SPECIAL INSTRUCTIONSFT
WOUND CARE:  The day after your procedure:  - Remove the bandage at the site gently, clean with a small amount of soap and water, and pat try. Leave open to air.  - You may shower.  - Do not apply lotions, creams, ointments, powder, or perfumes to your incision site.  - Check your wrist or groin daily. A small amount of bruising or soreness is normal.  - Do not soak the procedural site for 1 week (no baths, pools, tubs, etc).    ACTIVITY:  For the next 24 hours:  - Do not drive or operate heavy machinery.  - Do not drink any alcoholic beverages.  - Do not make any important decisions or sign legal documents.    If your procedure was performed through the wrist,  For the next 3 days:  - Avoid pushing and pulling with the affected wrist.   - Avoid repeated movement of the affected hand and wrist (typing, hammering).  - Do not lift anything more than 5 pounds.    If your procedure was performed through the groin,  For the next 5 days:  - Limit climbing stairs.  - Do not soak in a bathtub or pool.  - Avoid strenuous activities (pushing, pulling, straining).  - Do not lift anything more than 10 pounds.    MEDICATION:   - Take your medications as explained (see attached medication reconciliation on discharge paperwork).    ADDITIONAL INSTRUCTIONS:  - Follow a heart healthy diet recommended by your doctor.   - If you smoke, we encourage smoking cessation. You may call [phone] for center of tobacco control if you need assistance.  - Call your doctor to make appointment within 2 weeks.    ***CALL YOUR DOCTOR***  - If you experience fever, chills, body aches, or severe pain, swelling, redness, heat, or yellow discharge from your incision site.  - If you notice bleeding or significant swelling at the procedural site.  - If you experience chest pain.  - If you experience extremity numbness, tingling, or temperature change.    If you are unable to get in contact with your doctor, you may contact the [facility] at [phone].  Please reference your discharge instructions for any questions or concerns.

## 2024-05-23 NOTE — H&P CARDIOLOGY - COMMENTS
Urine pregnancy: N/A  Dentures: Uppers/lowers removed  Last PO intake: 5/22/2024 at 23:00  Obstructive sleep apnea: No  Aspiration risk: No  Mallampati score: 2  ASA Classification: 2  Prior Sedative or Anesthesia Experience: No complications  Informed consent by responsible adult: Yes  Responsible adult escort: Yes  Based on today's assessment, anesthesia consult requested: No

## 2024-05-23 NOTE — ASU PATIENT PROFILE, ADULT - FALL HARM RISK - UNIVERSAL INTERVENTIONS
Bed in lowest position, wheels locked, appropriate side rails in place/Call bell, personal items and telephone in reach/Instruct patient to call for assistance before getting out of bed or chair/Non-slip footwear when patient is out of bed/Balm to call system/Physically safe environment - no spills, clutter or unnecessary equipment/Purposeful Proactive Rounding/Room/bathroom lighting operational, light cord in reach

## 2024-05-23 NOTE — ASU DISCHARGE PLAN (ADULT/PEDIATRIC) - NS MD DC FALL RISK RISK
For information on Fall & Injury Prevention, visit: [email or web address] OR  [email or web address] OR  https://www.cdc.gov/steadi/patient.html

## 2024-05-24 ENCOUNTER — TRANSCRIPTION ENCOUNTER (OUTPATIENT)
Age: 59
End: 2024-05-24

## 2024-05-29 PROBLEM — D63.8 ANEMIA IN OTHER CHRONIC DISEASES CLASSIFIED ELSEWHERE: Chronic | Status: ACTIVE | Noted: 2024-05-23

## 2024-05-29 PROBLEM — N18.6 END STAGE RENAL DISEASE: Chronic | Status: ACTIVE | Noted: 2024-05-23

## 2024-05-29 PROBLEM — E03.9 HYPOTHYROIDISM, UNSPECIFIED: Chronic | Status: ACTIVE | Noted: 2024-05-23

## 2024-05-30 ENCOUNTER — APPOINTMENT (OUTPATIENT)
Dept: CARDIOLOGY | Facility: CLINIC | Age: 59
End: 2024-05-30
Payer: COMMERCIAL

## 2024-05-30 ENCOUNTER — NON-APPOINTMENT (OUTPATIENT)
Age: 59
End: 2024-05-30

## 2024-05-30 VITALS
DIASTOLIC BLOOD PRESSURE: 70 MMHG | BODY MASS INDEX: 22.31 KG/M2 | SYSTOLIC BLOOD PRESSURE: 150 MMHG | HEART RATE: 59 BPM | OXYGEN SATURATION: 98 % | WEIGHT: 122 LBS

## 2024-05-30 DIAGNOSIS — N18.6 END STAGE RENAL DISEASE: ICD-10-CM

## 2024-05-30 DIAGNOSIS — I10 ESSENTIAL (PRIMARY) HYPERTENSION: ICD-10-CM

## 2024-05-30 DIAGNOSIS — Z99.2 END STAGE RENAL DISEASE: ICD-10-CM

## 2024-05-30 DIAGNOSIS — E78.5 HYPERLIPIDEMIA, UNSPECIFIED: ICD-10-CM

## 2024-05-30 PROCEDURE — G2211 COMPLEX E/M VISIT ADD ON: CPT

## 2024-05-30 PROCEDURE — 93000 ELECTROCARDIOGRAM COMPLETE: CPT | Mod: NC

## 2024-05-30 PROCEDURE — 99204 OFFICE O/P NEW MOD 45 MIN: CPT

## 2024-05-30 RX ORDER — SODIUM BICARBONATE 650 MG/1
650 TABLET ORAL 3 TIMES DAILY
Refills: 0 | Status: DISCONTINUED | COMMUNITY
Start: 2023-07-21 | End: 2024-05-30

## 2024-05-30 RX ORDER — NIFEDIPINE 60 MG/1
60 TABLET, EXTENDED RELEASE ORAL
Refills: 0 | Status: ACTIVE | COMMUNITY
Start: 2024-05-30

## 2024-06-02 NOTE — CARDIOLOGY SUMMARY
[de-identified] : 5/30/2024 - sinus bradycardia at 54 bpm, left atrial enlargement, nonspecific ST and T abnormality [de-identified] : 3/28/2024. Pharmacologic Nuclear Stress Test.  1. Normal myocardial perfusion scan, with no evidence of infarction or inducible ischemia.  2. Normal left ventricular regional wall motion.  3. Qualitative Perfusion:     - small-sized, mild to moderate defect(s) in the basal inferolateral wall, totally normalizes with prone imaging consistent with diaphragmatic attenuation artifact.  4. The left ventricle is normal in function and normal in size. Normal left ventricular diastolic function. The post stress left ventricular EF is 72 %. The stress end diastolic volume is 77 ml and systolic volume is 22 ml.  5. Normal right ventricular function. There is no right ventricular dilation. RVEF 67%; RVEDV 84 mL; RVESV 24 mL. [de-identified] : 3/28/2024. TTE.  1. Left ventricular cavity is normal in size. Left ventricular systolic function is normal with an ejection fraction of 69 % by Macedo's method of disks. There are no regional wall motion abnormalities seen.  2. There is moderate (grade 2) left ventricular diastolic dysfunction, with elevated filling pressure.  3. Mild left ventricular hypertrophy.  4. There is increased LV mass and concentric hypertrophy.  5. Normal right ventricular cavity size and normal systolic function.  6. The left atrium is moderately dilated.  7. No significant valvular disease.  8. Estimated pulmonary artery systolic pressure is 47 mmHg, consistent with moderate pulmonary hypertension.  9. The inferior vena cava is normal in size measuring 1.50 cm in diameter, (normal <2.1cm) with normal inspiratory collapse (normal >50%) consistent with normal right atrial pressure (\R\3, range 0-5mmHg). 10. No pericardial effusion seen.

## 2024-06-02 NOTE — DISCUSSION/SUMMARY
[EKG obtained to assist in diagnosis and management of assessed problem(s)] : EKG obtained to assist in diagnosis and management of assessed problem(s) [FreeTextEntry1] : She is a 59 year old who presents today for pretransplant cardiac evaluation prior to potential renal transplant with history as above.   Her blood pressure today is 150/70 mm Hg. Continue nifedipine, hydralazine, bumetanide.  Recent echocardiogram and nuclear stress test as above.   No further cardiac testing is required prior to transplant consideration.

## 2024-06-02 NOTE — HISTORY OF PRESENT ILLNESS
[FreeTextEntry1] : Shila Hernandez presents today for pretransplant cardiac evaluation prior to potential renal transplant.   She is a 59-year-old woman, born in [facility] and living in the [place] 27 years, with known cardiac risk factors of hypertension, diabetes mellitus (diagnosed 25 years ago, previously on insulin, retinopathy and neuropathy) and end stage renal disease on hemodialysis (on HD x 6 months, [facility], has a permacath, left radial AVF is not ready for use). She was admitted with volume overload and in 2023 and was found to have a pericardial effusion requiring pericardiocentesis Surgical history includes cataract surgery, arteriovenous fistula creation.  Today she reports feeling well and has no specific complaints.   Her father is . He had a stroke, HTN, DM2 and ESRD on HD. Her mother is alive. She has DM2. She has one sister who passed in March, otherwise 6 sisters and 3 brothers.  . She has one son and one daughter.  She is not working, previously she was working as a home attendant.   For exercise she walks and remains active in the home. She denies any chest discomfort, shortness of breath, palpitations, lightheadedness or syncope.

## 2024-06-02 NOTE — END OF VISIT
[FreeTextEntry3] : I, Chris Ospina MD, personally performed the evaluation and management (E/M) services for this new patient. That E/M includes conducting the clinically appropriate initial history &/or exam, assessing all conditions, and establishing the plan of care. Today, Rissa Trevizo NP was here to observe my evaluation and management service for this patient & follow plan of care established by me going forward. [Time Spent: ___ minutes] : I have spent [unfilled] minutes of time on the encounter.

## 2024-06-05 NOTE — H&P ADULT - NSGCTIMESPENTATTENDAPP_GEN_A_CORE
What Type Of Note Output Would You Prefer (Optional)?: Standard Output
Has Your Skin Lesion Been Treated?: not been treated
Is This A New Presentation, Or A Follow-Up?: Skin Lesion
Additional History: Used the ketoconzole cream to area (no improvements)
Is This A New Presentation, Or A Follow-Up?: Mole
20

## 2024-06-05 NOTE — DISCHARGE NOTE NURSING/CASE MANAGEMENT/SOCIAL WORK - NSPROEXTENSIONSOFSELF_GEN_A_NUR
Emergency Department Attending Supervision Note        I evaluated this patient with [facility].       Briefly, the patient has a history of recurrent aspiration pneumonia, and presented with recurrent shortness of breath and tachycardia.  She has a normal cardiopulmonary exam but is requiring additional oxygen from her baseline.  Chest x-ray shows left lower lobe infiltrate similar to previous.  Given increasing hypoxia, suspect recurrent aspiration episode.  Will cover with broad-spectrum antibiotics.  Lactic is reassuringly normal.  Vital signs are stabilized after application of additional O2.  She is safe for admission at this time.  BNP is elevated although there is no overt evidence of CHF on exam, plan echo during admission        Independent interpretation: Left lower lobe infiltrate    Review of external records: Reviewed 5/30/2024 hospitalization        Visit Diagnosis, Associated Orders, and Comments     ICD-10-CM    1. Acute respiratory failure with hypoxia (H)  J96.01       2. Elevated brain natriuretic peptide (BNP) level  R79.89       3. Recurrent aspiration pneumonia (H)  J69.0           Bhavesh Sanchez MD  [facility] Emergency Department           Bhavesh Sanchez MD  06/05/24 6523     none

## 2024-06-07 NOTE — ED ADULT TRIAGE NOTE - NSTRIAGECARE_GEN_A_ER
ER Provider Note    Primary Care Provider: Bethel Yin M.D.    CHIEF COMPLAINT  Chief Complaint   Patient presents with    Fever     Since yesterday.  Tmax: 104F  Vomited medication approx 1230 today per mother  Pulling ears.  Decreased PO intake. Decreased wet diapers.  Loose stools yesterday    Rash     Red spots face, legs, belly  Mother reports cough, nasal congestion.     HPI/ROS  OUTSIDE HISTORIAN(S):  Parent at bedside who provided history as seen below.     Mitesh Paige is a 14 m.o. male who presents to the ED for diffuse rash to face, legs and trunk onset last night or this morning. Mother notes that the patient has associated symptoms of fever with a maximum temperature of 104 °F, cough, congestion and runny nose. He has also been touching his ears and throat more than usual. Per nursing note, the patient had one episode of vomiting at 12:30 after medication was given and had decrease PO intake with decreased wet diapers. Mother notes throughout the day, her face has started to get red as well. The patient has no major past medical history, and takes no daily medications. Vaccinations are up to date.     PAST MEDICAL HISTORY  History reviewed. No pertinent past medical history.  Vaccinations are UTD.     SURGICAL HISTORY  History reviewed. No pertinent surgical history.    FAMILY HISTORY  History reviewed. No pertinent family history.    SOCIAL HISTORY   reports that he has never smoked. He has never used smokeless tobacco. He reports that he does not drink alcohol.  Patient is accompanied by his mother, whom he lives with.     CURRENT MEDICATIONS  Current Outpatient Medications   Medication Instructions    acetaminophen (TYLENOL) 160 MG/5ML Suspension 15 mg/kg, Oral, EVERY 4 HOURS PRN       ALLERGIES  Penicillins    PHYSICAL EXAM  BP (!) 91/66 Comment: moving leg  Pulse 138   Temp 37.2 °C (98.9 °F) (Temporal)   Resp 32   Wt 10.6 kg (23 lb 5.9 oz)   SpO2 96%   Constitutional: Well  developed, Well nourished, No acute distress, Non-toxic appearance.   HENT: Normocephalic, Atraumatic, Bilateral external ears normal, Normal TMs, Oropharynx moist, No oral exudates, Nose normal.   Eyes: PERRL, EOMI, Conjunctiva normal, No discharge.  Neck: Neck has normal range of motion, no tenderness, and is supple.   Lymphatic: No cervical lymphadenopathy noted.   Cardiovascular: Normal heart rate, Normal rhythm, No murmurs, No rubs, No gallops.   Thorax & Lungs: Normal breath sounds, No respiratory distress, No wheezing, No chest tenderness, No accessory muscle use, No stridor.  Skin: Warm, Dry, Few erythematous lesions, some of which appear to have some vesicle present to the hands, trunk, perioral area and the feet.  Abdomen: Soft, No tenderness, No masses.  Neurologic: Alert, Moves all extremities equally.    DIAGNOSTIC STUDIES & PROCEDURES    Labs:   Rubeola testing pending  All labs reviewed by me.     COURSE & MEDICAL DECISION MAKING    ED Observation Status? No; Patient does not meet criteria for ED Observation.     INITIAL ASSESSMENT AND PLAN  Care Narrative:     2:15 PM - Patient was evaluated; Patient presents for evaluation of  diffuse rash to face, legs and trunk onset last night or this morning. Mother notes that the patient has associated symptoms of fever with a maximum temperature of 104 °F, cough, congestion and runny nose. He has also been touching his ears and throat more than usual. Per nursing note, the patient had one episode of vomiting at 12:30 after medication was given and had decrease PO intake with decreased wet diapers. Mother notes throughout the day, her face has started to get red as well. The patient is well appearing here with reassuring vitals and exam. Exam reveals normal TMs, and few erythematous lesions, some of which appear to have some vesicle present to the hands, trunk, perioral area and the feet.. Discussed plan of care, including that the patient's symptoms appear  consistent with the onset of Hand, Foot, and Mouth disease. Mother is concerned about the possibility of Measles and I think it Is reasonable to test for that as well as Hand, Foot and Mouth disease. Mom agrees to plan of care. Rubeola IGM AB, Rubeola IGG AB, and Measles by PCR ordered.     DISPOSITION:  Patient will be discharged home with parent in stable condition.    FOLLOW UP:  Bethel Yin M.D.  [address]  [facility] [id number] [phone]      As needed, If symptoms worsen      OUTPATIENT MEDICATIONS:  Discharge Medication List as of 6/7/2024  2:30 PM        Guardian was given return precautions and verbalizes understanding. They will return for new or worsening symptoms.      FINAL IMPRESSION  1. Fever, unspecified fever cause    2. Rash       Shraddha DONNELLY (Scribe), am scribing for, and in the presence of, Isai Adhikari M.D..    Electronically signed by: Shraddha Carmona (Scribe), 6/7/2024    Isai DONNELLY M.D. personally performed the services described in this documentation, as scribed by Shraddha Carmona in my presence, and it is both accurate and complete.     The note accurately reflects work and decisions made by me.  Isai Adhikari M.D.  6/7/2024  3:13 PM   EKG

## 2024-06-11 RX ORDER — LIDOCAINE AND PRILOCAINE 25; 25 MG/G; MG/G
2.5-2.5 CREAM TOPICAL
Qty: 1 | Refills: 5 | Status: ACTIVE | COMMUNITY
Start: 2024-06-11 | End: 1900-01-01

## 2024-06-18 ENCOUNTER — APPOINTMENT (OUTPATIENT)
Dept: GASTROENTEROLOGY | Facility: CLINIC | Age: 59
End: 2024-06-18
Payer: COMMERCIAL

## 2024-06-18 VITALS
WEIGHT: 120 LBS | SYSTOLIC BLOOD PRESSURE: 140 MMHG | HEIGHT: 62 IN | RESPIRATION RATE: 14 BRPM | DIASTOLIC BLOOD PRESSURE: 60 MMHG | HEART RATE: 70 BPM | OXYGEN SATURATION: 92 % | BODY MASS INDEX: 22.08 KG/M2

## 2024-06-18 DIAGNOSIS — Z12.11 ENCOUNTER FOR SCREENING FOR MALIGNANT NEOPLASM OF COLON: ICD-10-CM

## 2024-06-18 DIAGNOSIS — Z01.818 ENCOUNTER FOR OTHER PREPROCEDURAL EXAMINATION: ICD-10-CM

## 2024-06-18 DIAGNOSIS — E11.9 TYPE 2 DIABETES MELLITUS W/OUT COMPLICATIONS: ICD-10-CM

## 2024-06-18 DIAGNOSIS — N18.9 CHRONIC KIDNEY DISEASE, UNSPECIFIED: ICD-10-CM

## 2024-06-18 DIAGNOSIS — Z86.79 PERSONAL HISTORY OF OTHER DISEASES OF THE CIRCULATORY SYSTEM: ICD-10-CM

## 2024-06-18 PROCEDURE — 99214 OFFICE O/P EST MOD 30 MIN: CPT

## 2024-06-19 PROBLEM — Z86.79 HISTORY OF ESSENTIAL HYPERTENSION: Status: RESOLVED | Noted: 2024-06-19 | Resolved: 2024-06-19

## 2024-06-19 PROBLEM — E11.9 TYPE 2 DIABETES MELLITUS WITHOUT COMPLICATION, WITHOUT LONG-TERM CURRENT USE OF INSULIN: Status: RESOLVED | Noted: 2024-06-19 | Resolved: 2024-06-19

## 2024-06-19 PROBLEM — N18.9 CRF (CHRONIC RENAL FAILURE): Status: RESOLVED | Noted: 2024-06-19 | Resolved: 2024-06-19

## 2024-06-19 RX ORDER — POLYETHYLENE GLYCOL 3350 AND ELECTROLYTES WITH LEMON FLAVOR 236; 22.74; 6.74; 5.86; 2.97 G/4L; G/4L; G/4L; G/4L; G/4L
236 POWDER, FOR SOLUTION ORAL
Qty: 1 | Refills: 0 | Status: ACTIVE | COMMUNITY
Start: 2024-06-19 | End: 1900-01-01

## 2024-06-19 NOTE — ASSESSMENT
[FreeTextEntry1] : LAUREN CARRILLO was advised to undergo colonoscopy to which she agreed. The procedure will be performed in [facility]  [facility] with the assistance of an anesthesiologist. The patient was given a Golytely  preparation prescription and understood the  procedure as it was explained to her. She was given a booklet distributed by the [employer]  [employer] explaining the procedure in detail and she understood the risks of the procedure not limited to infection, bleeding, perforation or non- diagnosis of colorectal cancer. She was advised that she could not drive home, if she chooses to  receive sedation.  Further diagnostic and treatment recommendations will be based upon the procedure and any biopsies, if they are taken.  Thank you for allowing me to participate in this [facility] care.  , Best personal regards -- Don   I spent 47 minutes with the patient and her son and answered all of their questions

## 2024-06-19 NOTE — HISTORY OF PRESENT ILLNESS
[FreeTextEntry1] : She is an  asymptomatic 59-year-old female referred for a pre kidney transplant evaluation of her colon.  She has never had a colonoscopy before.  She denies a family history of colon cancer.  She is on dialysis Monday Wednesday and Friday.  She is not on any blood thinners.  She was seen by cardiology and has received cardiology clearance.  Her son was in the room

## 2024-06-19 NOTE — CONSULT LETTER
[Dear  ___] : Dear  [unfilled], [Consult Letter:] : I had the pleasure of evaluating your patient, [unfilled]. [( Thank you for referring [unfilled] for consultation for _____ )] : Thank you for referring [unfilled] for consultation for [unfilled] [Please see my note below.] : Please see my note below. [Consult Closing:] : Thank you very much for allowing me to participate in the care of this patient.  If you have any questions, please do not hesitate to contact me. [Sincerely,] : Sincerely, [FreeTextEntry3] : Broderick Suarez MD  Gastroenterology [facility]

## 2024-06-27 ENCOUNTER — APPOINTMENT (OUTPATIENT)
Dept: ENDOVASCULAR SURGERY | Facility: CLINIC | Age: 59
End: 2024-06-27
Payer: COMMERCIAL

## 2024-06-27 PROCEDURE — 36589 REMOVAL TUNNELED CV CATH: CPT | Mod: 58

## 2024-07-03 ENCOUNTER — APPOINTMENT (OUTPATIENT)
Dept: VASCULAR SURGERY | Facility: CLINIC | Age: 59
End: 2024-07-03

## 2024-07-03 NOTE — PATIENT PROFILE ADULT - HISTORY OF COVID-19 VACCINATION
Luis Carlos Pop, CMA        
Vaccine status unknown

## 2024-07-03 NOTE — H&P ADULT - NS ATTEST RISK PROBLEM GEN_ALL_CORE FT
----- Message from Cece Ribeiro sent at 7/3/2024 10:53 AM CDT -----  Regarding: Following up on refill request  Type: Needs Medical Advice  Who Called:  [facility]    Pharmacy name and phone #:        [facility] - [place] - [address]  [address]  [place] [address]  Phone: [phone] Fax: [phone]      Best Call Back Number: [phone]  Additional Information: Following up on her refill request for amplodepine   Exacerbation of HFpEF requiring IV diuresis, Acute respiratory failure requiring bilevel support

## 2024-07-25 NOTE — PROGRESS NOTE ADULT - PROBLEM/PLAN-11
[facility]-Med/Surg  Discharge Note  Short Stay    Procedure(s) (LRB):  APPENDECTOMY, LAPAROSCOPIC (N/A)      OUTCOME: Patient tolerated treatment/procedure well without complication and is now ready for discharge.    DISPOSITION: Home or Self Care      FINAL DIAGNOSIS:    Acute non perforated suppurative appendicitis  FOLLOWUP: In clinic 1 week    DISCHARGE INSTRUCTIONS:  No discharge procedures on file.     TIME SPENT ON DISCHARGE:  5 minutes  
DISPLAY PLAN FREE TEXT

## 2024-08-01 ENCOUNTER — APPOINTMENT (OUTPATIENT)
Dept: GASTROENTEROLOGY | Facility: AMBULATORY SURGERY CENTER | Age: 59
End: 2024-08-01
Payer: COMMERCIAL

## 2024-08-01 PROCEDURE — 45378 DIAGNOSTIC COLONOSCOPY: CPT

## 2024-08-12 NOTE — PATIENT PROFILE ADULT - WILL THE PATIENT ACCEPT THE PFIZER COVID-19 VACCINE IF ELIGIBLE AND IT IS AVAILABLE?
No Continue Regimen: Spironolactone 100mg 1 PO QD Plan: No history of PCOS. Patient with history of depression and anxiety which is controlled with medications and stable. No history of crohns or UC. No contacts. Initiate Treatment: Absorica 40 mg capsule Take one capsule once a day with food Detail Level: Zone Plan: Course of mupirocin 2 % topical ointment and griseofulvin microsize 500 mg tablet completed. Now clear

## 2024-08-13 NOTE — ED PROVIDER NOTE - HIV OFFER
-- DO NOT REPLY / DO NOT REPLY ALL --  -- This inbox is not monitored. If this was sent to the wrong provider or department, reroute message to P ECO Reroute pool. --  -- Message is from Engagement Center Operations (ECO) --    Offered Waitlist if Available for the Visit Type? No    Caller is requesting an appointment.    Reason for Appointment Message:  Priority rescheduling request  If Chon Simon MD doesn't have an opening, can he see another provider    Reason for Visit: MWV was bumped from 9/16/24 next opening is Jan 2025, Patient gets points and other goodies if completed in 2024-[employer]    Is the patient currently scheduled? No    Preferred time to be seen: Later afternoon if possible      Can a detailed message be left?  Yes - Voicemail    Patient has been advised the message will be addressed within 2-3 business days        Opt out

## 2024-08-28 NOTE — PROGRESS NOTE ADULT - PROBLEM SELECTOR PLAN 1
Would like order for portable oxygen concentrator to be sent to [facility]     initially presented with bradycardia and hypothermia, likely in setting of profound hypothyroidism/myxedema  - history of hypothyroidism, and pt reported missing doses  - endocrinology following, recs appreciated  - now s/p hydrocortisone therapy, per endo last dose to be given today  - c/w levothyroxine 112 mcg daily Patient w/ hypothermia POA and tachypnea POA meeting sepsis criteria   - Worsening respiratory distress, placed on BIPAP this afternoon from nasal cannula. Patient recently required intubation at OSH. Low threshold for MICU re-eval   - Pulm consulted, discussed w/ fellow Dr. Grace - will consider therapeutic vs. diagnostic thoracentesis    - CTA chest with bilateral pleural effusions and GGO, no PE. CT A/P commenting on increasing basilar airspace opacity compared to prior examination with dense consolidation in both lower lobes and right greater than left pleural effusions, not significantly changed.  - Given consolidations on CT, will treat empirically with Zosyn + Vanco (by level) given recent hospitalizations. Check sputum cx and MRSA swab. Stop Vanco if able   - TTE pending   - Diuresis w/ bumex per renal recs   - Unclear the cause of her persistent hospitalizations for multiorgan failure, she does not appear to be in myxedema coma - is this rheumatologic vs. hematologic? Will send ANCAs, complement, STEVO, dsDNA + SPEP/UPEP, immunofixation in AM

## 2024-08-30 ENCOUNTER — NON-APPOINTMENT (OUTPATIENT)
Age: 59
End: 2024-08-30

## 2024-09-11 ENCOUNTER — NON-APPOINTMENT (OUTPATIENT)
Age: 59
End: 2024-09-11

## 2024-09-19 ENCOUNTER — APPOINTMENT (OUTPATIENT)
Dept: VASCULAR SURGERY | Facility: CLINIC | Age: 59
End: 2024-09-19

## 2024-10-18 ENCOUNTER — NON-APPOINTMENT (OUTPATIENT)
Age: 59
End: 2024-10-18

## 2024-10-21 NOTE — ED ADULT NURSE NOTE - NSINTERVENTIONOPT_GEN_ALL_ED
[facility] Internal Medicine   Progress Note              Subjective:  Patient seen and examined by me.   Doing well and sitting up in chair. No concerns reported per pt.    Dispo: pending completion of cycle 1 of chemo                 14 point Review of systems is negative except for as noted above.     I/O's    Intake/Output Summary (Last 24 hours) at 10/21/2024 1116  Last data filed at 10/21/2024 0709  Gross per 24 hour   Intake --   Output 2200 ml   Net -2200 ml         ALLERGIES:  Patient has no known allergies.     No data recorded  MAP (mmHg)  Av.8  Min: 92  Max: 100          HOSPITAL MEDS  Current Facility-Administered Medications   Medication Dose Route Frequency Provider Last Rate Last Admin    azaCITIDine (VIDAZA) chemo SubQ injection 150 mg  75 mg/m2 (Treatment Plan Recorded) Subcutaneous Once Keya Sloan MD        levothyroxine (SYNTHROID, LEVOTHROID) tablet 50 mcg  50 mcg Oral Daily Hanh Rob DO   50 mcg at 10/21/24 0528    venetoclax (VENCLEXTA) tablet 100 mg  100 mg Oral Daily Keya Sloan MD   100 mg at 10/21/24 0831    posaconazole (NOXAFIL) tablet 300 mg  300 mg Oral Daily Keya Sloan MD   300 mg at 10/21/24 0828    levoFLOXacin (LEVAQUIN) tablet 500 mg  500 mg Oral QAM AC Keya Sloan MD   500 mg at 10/21/24 0528    acyclovir (ZOVIRAX) capsule 400 mg  400 mg Oral 2 times per day Keya Sloan MD   400 mg at 10/21/24 0828    pantoprazole (PROTONIX) EC tablet 40 mg  40 mg Oral Daily Paula Singh MD   40 mg at 10/21/24 0828    [Held by provider] lisinopril (ZESTRIL) tablet 40 mg  40 mg Oral Daily Paula Singh MD   40 mg at 10/18/24 0825    sodium chloride 0.9 % injection 2 mL  2 mL Intracatheter 2 times per day Paula Singh MD   2 mL at 10/21/24 0827    Potassium Standard Replacement Protocol (Levels 3.5 and lower)   Does not apply See Admin Instructions Paula Singh MD        Potassium Replacement (Levels 3.6  - 4)   Does not apply See Admin Instructions Paula Singh MD        Magnesium Standard Replacement Protocol   Does not apply See Admin Instructions Paula Singh MD        insulin lispro (ADMELOG,HumaLOG) - Correction Dose   Subcutaneous Nightly Paula Singh MD        insulin lispro (ADMELOG,HumaLOG) - Correction Dose   Subcutaneous TID WC Paula Singh MD   1 Units at 10/18/24 1139    heparin (porcine) injection 5,000 Units  5,000 Units Subcutaneous 3 times per day Paula Singh MD   5,000 Units at 10/20/24 1450       Current Facility-Administered Medications   Medication Dose Route Frequency Provider Last Rate Last Admin    sodium chloride 0.9% infusion   Intravenous Continuous Jennifer Rob  mL/hr at 10/21/24 0709 New Bag at 10/21/24 0709       Current Facility-Administered Medications   Medication Dose Route Frequency Provider Last Rate Last Admin    sodium chloride (NORMAL SALINE) 0.9 % bolus 1,000 mL  1,000 mL Intravenous Once PRN Keya Sloan MD        sodium chloride (NORMAL SALINE) 0.9 % bolus 1,000 mL  1,000 mL Intravenous Once PRN Keya Sloan MD        EPINEPHrine (ADRENALIN) injection 0.3 mg  0.3 mg Intramuscular Q5 Min PRN Keya Sloan MD        diphenhydrAMINE (BENADRYL) injection 50 mg  50 mg Intravenous Once PRN Keya Sloan MD        famotidine (PEPCID) injection 20 mg  20 mg Intravenous Once PRN Keya Sloan MD        methylPREDNISolone (SOLU-Medrol) injection 125 mg  125 mg Intravenous Once PRN Keya Sloan MD        albuterol inhaler 2 puff  2 puff Inhalation Q20 Min PRN Keya Sloan MD        albuterol (VENTOLIN) nebulizer 5 mg  5 mg Nebulization Q20 Min PRN Keya Sloan MD        morphine injection 2 mg  2 mg Intravenous PRN Kyea Sloan MD        sodium chloride (NORMAL SALINE) 0.9 % bolus 1,000 mL  1,000 mL Intravenous Once PRN Keya Sloan MD        sodium chloride  (NORMAL SALINE) 0.9 % bolus 1,000 mL  1,000 mL Intravenous Once PRN Keya Sloan MD        prochlorperazine (COMPAZINE) tablet 10 mg  10 mg Oral Q6H PRN Keya Sloan MD        sodium chloride (NORMAL SALINE) 0.9 % bolus 1,000 mL  1,000 mL Intravenous Once PRN Keya Sloan MD        sodium chloride (NORMAL SALINE) 0.9 % bolus 1,000 mL  1,000 mL Intravenous Once PRN Keya Sloan MD        sodium chloride 0.9 % injection 10 mL  10 mL Intravenous PRN Paula Singh MD        acetaminophen (TYLENOL) tablet 650 mg  650 mg Oral Q6H PRN Paula Singh MD        Or    acetaminophen (TYLENOL) suppository 650 mg  650 mg Rectal Q4H PRN Paula Singh MD        ondansetron (ZOFRAN ODT) disintegrating tablet 4 mg  4 mg Oral Q6H PRN Paula Singh MD        Or    ondansetron (ZOFRAN) injection 4 mg  4 mg Intravenous Q12H PRN Paula Singh MD        polyethylene glycol (MIRALAX) packet 17 g  17 g Oral Daily PRN Paula Singh MD        docusate sodium-sennosides (SENOKOT S) 50-8.6 MG 2 tablet  2 tablet Oral BID PRN Paula Singh MD        bisacodyl (DULCOLAX) suppository 10 mg  10 mg Rectal Daily PRN Paula Singh MD        magnesium hydroxide (MILK OF MAGNESIA) 400 MG/5ML suspension 30 mL  30 mL Oral Daily PRN Paula Singh MD        melatonin tablet 3 mg  3 mg Oral Nightly PRN Paula Singh MD        dextrose 50 % injection 25 g  25 g Intravenous PRN Paula Singh MD        dextrose 50 % injection 12.5 g  12.5 g Intravenous PRN Paula Singh MD        glucagon (GLUCAGEN) injection 1 mg  1 mg Intramuscular Paula Juarez MD        dextrose (GLUTOSE) 40 % gel 15 g  15 g Oral PRN Paula Singh MD        dextrose (GLUTOSE) 40 % gel 30 g  30 g Oral PRN Paula Singh MD              Last Recorded Vitals      SpO2 Readings from Last 3 Encounters:   10/21/24 96%   10/15/24 100%    10/10/24 97%        VITAL SIGNS:     Vital Last Value 24 Hour Range   Temperature 98.2 °F (36.8 °C) (10/21/24 0827) Temp  Min: 98.2 °F (36.8 °C)  Max: 98.2 °F (36.8 °C)   Pulse 85 (10/21/24 0827) Pulse  Min: 67  Max: 85   Respiratory 16 (10/21/24 0827) Resp  Min: 16  Max: 20   Non-Invasive  Blood Pressure (!) 167/66 (10/21/24 0827) BP  Min: 138/69  Max: 167/66   Pulse Oximetry 96 % (10/21/24 0827) SpO2  Min: 96 %  Max: 98 %   Arterial   Blood Pressure   No data recorded      Vital Today Admitted   Weight 82.9 kg (182 lb 12.2 oz) (10/21/24 0827) Weight: 82.6 kg (182 lb 1.6 oz) (10/16/24 1000)   Height N/A Height: 5' 7.5\" (171.5 cm) (10/16/24 1000)   BMI N/A BMI (Calculated): 28.1 (10/16/24 1000)            Physical Exam:  General: Alert and oriented, no acute distress  Eyes: no scleral icterus, no conjunctival erythema   Cardio: S1, S2, RRR  Pulm: Lungs clear to auscultation bilaterally  GI: Soft, non-tender, nondistended.  Ext: No upper or lower extremity edema noted.   Musculoskeletal: 5/5 strength both upper and lower extremities. No joint tenderness or erythema.  Skin: No abnormal bruising or discoloration noted. No jaundice.   Psych: Appropriate mood and affect. Good Insight and Judgment  Neuro: No focal motor or sensory deficits noted. Pt appropriately follows commands.    Labs   Recent Labs     10/19/24  0412 10/20/24  0500 10/21/24  0414   WBC 2.7* 2.4* 2.3*   RBC 2.01* 2.46* 2.38*   HGB 6.4* 7.6* 7.3*   HCT 18.6* 22.8* 22.0*   PLT 58* 50* 46*   MCV 92.5 92.7 92.4   MCH 31.8 30.9 30.7   MCHC 34.4 33.3 33.2   NRBCRE 0 0 0   ASEG 1.1* 1.1* 1.4*   ALYMP 1.5 1.3 0.9*   AMONO 0.1* 0.1*  --    PMOR Normal Normal Normal         Recent Labs     10/19/24  0412 10/20/24  0500 10/21/24  0414   SODIUM 137 137 140   POTASSIUM 4.1 4.5 4.3   MG  --  2.5* 2.3   PHOS 3.5 2.6 2.1*   CO2 25 25 24   ANIONGAP 8 7 8   GLUCOSE 128* 125* 128*   BUN 29* 23* 24*   CREATININE 1.94* 1.72* 1.43*   CALCIUM 8.9 8.9 8.8   BILIRUBIN 1.3*  1.4* 1.3*   AST 21 21 28   GPT 25 29 31   ALKPT 52 58 60   GLOB 3.1 3.1 3.0   AGR 0.9* 0.9* 0.9*        Recent Labs   Lab 10/21/24  0414 10/20/24  0500 10/19/24  0412   SODIUM 140 137 137   POTASSIUM 4.3 4.5 4.1   CHLORIDE 112* 110 108   CO2 24 25 25   BUN 24* 23* 29*   CREATININE 1.43* 1.72* 1.94*   GLUCOSE 128* 125* 128*   ALBUMIN 2.7* 2.7* 2.7*   PHOS 2.1* 2.6 3.5   AST 28 21 21   BILIRUBIN 1.3* 1.4* 1.3*       No results for input(s): \"PCT\" in the last 72 hours.     No results found     No results for input(s): \"INR\", \"PT\", \"PTT\" in the last 72 hours.    Recent Labs   Lab 10/20/24  1117 10/20/24  1657 10/20/24  2043 10/21/24  0738   GLUCOSE BEDSIDE 148* 127* 131* 105*       Imaging    US KIDNEY BILATERAL   Final Result   No obstructive uropathy.      Questionable 1.9 x 2.2 cm partly exophytic hypodense mass lower pole of the   left kidney versus more likely slight cortical lobulation. No corresponding   mass is seen in the lower pole of the left kidney on recent abdominal CT.   Recommend follow-up evaluation with renal ultrasound within 1 month for   reassessment.               Electronically Signed by: GINGER THURSTON    Signed on: 10/20/2024 7:31 AM    Workstation ID: [id number]          Cultures  Microbiology Results       None            All imaging, labs, vitals and related tests have been reviewed and interpreted by me.     Assessment/Plan:    Yovani Connelly is a 85 year old male with history of  type 2 diabetes, CKD 3A, hypertension, hypothyroidism, prostate cancer s/p prostatectomy, pancytopenia, recent diagnosis of myelodysplastic syndrome is admitted for chemotherapy.     Myelodysplastic syndrome recent diagnosis  Admitted to receive chemotherapy  -Oncology on consult  -Started on chemo Vidaza and venetoclax  -On prophylactic antivirals acyclovir and Posaconazole and antibacterial levoquin  -On Zofran as needed     Antineoplastic chemotherapy induced pancytopenia   Pancytopenia secondary to  above  -s/p Rbc trxs, last transfusion on 10/15 as outpt  -Hb 7.6, wbc 2.8 and Plt 83 on admission  -10/21: Hgb 7.3   -CTM cbc      YONAS on CKD stage 3  -on admission creatinine 1.14, GFR 63  -creatinine 1.94, GFR 34  -likely 2/2 chemotherapy  -encouraged PO fluid intake  -U/A showing no casts  -Nephro consulted; started pt on mIVF  -CTM; currently stable     G1DD, Chronic HFpEF  - Recently admitted with LE edema. BNP elevated. TTE with EF 60% and G1DD   - Not in acute exacerbation; currently stable with no sign of fluid overload      DM type 2  -hold home med metformin   -LDSSi     HTN, HLD  -hold lisinopril  -continue statin statin         Hypothyroidism  -Continue levothyroxine       DVT Prophylaxis:   Current Active Medications for DVT Prophylaxis (From admission, onward)           Stop     heparin (porcine) injection 5,000 Units  5,000 Units,   Subcutaneous,   3 times per day         --                   Diet: Regular Diet  Baseline Activity: Ambulates Independently    CODE STATUS:   Code Status: Full Resuscitation    Physician Notification:  Consultants notified of patient via [employer].  Communication: with patient, nurse     MORE than 50 MINS WERE SPENT ON THIS PATIENTS CARE TODAY. This includes the following: Reviewed all vitals, medications, new orders, I/O, labs, micro, radiology, nurses notes, pertinent consultant notes which are reflected in assessment and plan.This does not include time spent on other items of care such as smoking cessation counseling, prolonged care time, and or advanced care planning if applicable.   (Level 1 PN: 25 min, Level 2 PN: 35 min, Level 3 PN: 50 min)     Primary Care Physician  Gilles Singh MD        [facility]  10/21/2024 11:16 AM           [place]

## 2024-11-01 NOTE — PROGRESS NOTE ADULT - SUBJECTIVE AND OBJECTIVE BOX
[facility] DIVISION OF KIDNEY DISEASES AND HYPERTENSION   FOLLOW UP NOTE  --------------------------------------------------------------------------------  HPI: 58 yo F with history of CKD-3b in the setting of longstanding DM and HTN, HLD, and asthma initially presented with generalized weakness and left sided chest/breast pain. Pt. was found to be bradycardic to 20s and hypothermic to 90.5 F. MICU was consulted. Pt. received IV epinephrine infusion with improvement in vitals. Nephrology was consulted for CKD, and hyperkalemia. Upon review of [facility], Scr was 2.27 on 1/31/22. Scr initially during this admission was 2.49 on 2/25/23. Pt. received IV Bumex and now on home oral bumex. Pt being seen for ISAMAR and hyperkalemia on CKD.     Pt. was seen and evaluated this morning. She is "feeling better". She is urinating without difficulty. She denied shortness of breath, nausea, vomiting, fevers, chills, LE swelling.     PAST HISTORY  --------------------------------------------------------------------------------  No significant changes to PMH, PSH, FHx, SHx, unless otherwise noted    ALLERGIES & MEDICATIONS  --------------------------------------------------------------------------------  Allergies  No Known Allergies    Standing Inpatient Medications  buMETAnide 1 milliGRAM(s) Oral two times a day  dextrose 50% Injectable 25 Gram(s) IV Push once  dextrose 50% Injectable 12.5 Gram(s) IV Push once  dextrose 50% Injectable 25 Gram(s) IV Push once  dextrose Oral Gel 15 Gram(s) Oral once  glucagon  Injectable 1 milliGRAM(s) IntraMuscular once  heparin   Injectable 5000 Unit(s) SubCutaneous every 8 hours  hydrALAZINE 50 milliGRAM(s) Oral every 8 hours  influenza   Vaccine 0.5 milliLiter(s) IntraMuscular once  insulin lispro (ADMELOG) corrective regimen sliding scale   SubCutaneous three times a day before meals  insulin lispro (ADMELOG) corrective regimen sliding scale   SubCutaneous at bedtime  levothyroxine 125 MICROGram(s) Oral daily  linagliptin 5 milliGRAM(s) Oral daily  pantoprazole    Tablet 40 milliGRAM(s) Oral before breakfast    PRN Inpatient Medications  acetaminophen     Tablet .. 650 milliGRAM(s) Oral every 6 hours PRN    REVIEW OF SYSTEMS  --------------------------------------------------------------------------------  Gen: +Generalized weakness  Skin: No rashes  Respiratory: No dyspnea  CV: See HPI, +L breast/chest pain  GI: No abdominal pain  : No dysuria, hematuria  MSK: No LE edema,   Heme: No easy bruising or bleeding  Psych: No significant depression    All other systems were reviewed and are negative, except as noted.    VITALS/PHYSICAL EXAM  --------------------------------------------------------------------------------  T(C): 36.3 (02-28-23 @ 12:00), Max: 37 (02-28-23 @ 05:00)  HR: 60 (02-28-23 @ 12:00) (60 - 69)  BP: 161/72 (02-28-23 @ 12:00) (141/62 - 169/64)  RR: 17 (02-28-23 @ 12:00) (17 - 19)  SpO2: 100% (02-28-23 @ 12:00) (99% - 100%)  Wt(kg): --  Height (cm): 157.5 (02-26-23 @ 22:00)  Weight (kg): 60 (02-26-23 @ 22:00)  BMI (kg/m2): 24.2 (02-26-23 @ 22:00)  BSA (m2): 1.6 (02-26-23 @ 22:00)    02-27-23 @ 07:01  -  02-28-23 @ 07:00  --------------------------------------------------------  IN: 380 mL / OUT: 980 mL / NET: -600 mL    02-28-23 @ 07:01  -  02-28-23 @ 13:29  --------------------------------------------------------  IN: 350 mL / OUT: 400 mL / NET: -50 mL    Physical Exam:  Gen: resting, NAD  HEENT: MMM  Pulm: CTA B/L  CV: S1S2+  Abd: Soft, +BS   Ext: Trace B/L LE edema   Neuro: Awake and alert  Skin: Warm and dry  Vascular access: Peripheral IV    LABS/STUDIES  --------------------------------------------------------------------------------              8.2    7.31  >-----------<  113      [02-28-23 @ 06:27]              25.4     134  |  100  |  86  ----------------------------<  85      [02-28-23 @ 06:27]  4.0   |  20  |  1.88        Ca     9.6     [02-28-23 @ 06:27]      Mg     1.70     [02-28-23 @ 06:27]      Phos  3.7     [02-28-23 @ 06:27]    TPro  6.9  /  Alb  3.6  /  TBili  0.4  /  DBili  x   /  AST  40  /  ALT  87  /  AlkPhos  966  [02-28-23 @ 06:27]    PT/INR: PT 13.5 , INR 1.16       [02-27-23 @ 05:42]  PTT: 49.5       [02-27-23 @ 05:42]    Creatinine Trend:  SCr 1.88 [02-28 @ 06:27]  SCr 2.10 [02-27 @ 05:42]  SCr 2.15 [02-26 @ 23:50]  SCr 2.29 [02-26 @ 14:10]  SCr 2.31 [02-26 @ 05:32] 182.7

## 2024-11-14 ENCOUNTER — NON-APPOINTMENT (OUTPATIENT)
Age: 59
End: 2024-11-14

## 2024-12-17 NOTE — PROGRESS NOTE ADULT - PROBLEM SELECTOR PLAN 2
Elevated troponin B/l Cr unclear but appears to range from 1.5 to 2.0 though in January was 2.0 to 2.3  Cr 2.4 at admission but BUN overtly elevated suggestive of prerenal ISAMAR, perhaps cardiorenal in etiology given CHF and per daughter, increased fluid intake since last admission but equivocal if fluid overloaded; patient herself noting she has not been drinking as much  Improving  -bumex x 1; f/u nephro recs   -urine lytes likely to be spurious given recent fluids but will check (FeUrea > 35% suggestive of intrinsic process)  -resume home bumex; strict I/Os; fluid restriction Elevated troponin

## 2024-12-24 NOTE — PHYSICAL THERAPY INITIAL EVALUATION ADULT - MD/RN NOTIFIED
Patient: Lucero Tam Date of Service: 2024   : 1938 MRN: [id number]     SUBJECTIVE:     HISTORY OF PRESENT ILLNESS:  Lucero Tam is a 86 year old female who presents today for leg edema    Sores, weeping   Had been good     Started about 2 weeks ago     Legs are somewhat painful     Uses a soft cloth to wash them     Burning sensation with urination     She is unsure if it has anything to do with the urine     No fevers or chills     Has been a few days of the burning  Today is day 3   No blood in urine   No kidney pain     No diarrhea         PAST MEDICAL HISTORY:  Past Medical History:   Diagnosis Date    Atrial fibrillation  (CMD)     COVID-19 virus infection 2022    Hyperlipidemia     Hypertension     Macular degeneration     PAD (peripheral artery disease) (CMD)     Pilonidal cyst     Sleep apnea     on IPAP mask    Type 2 diabetes mellitus (CMD)     Venous stasis ulcers  (CMD)        MEDICATIONS:  Current Outpatient Medications   Medication Sig    valACYclovir (VALTREX) 500 MG tablet TAKE 1 TABLET BY MOUTH TWICE DAILY AS DIRECTED    cephalexin (KEFLEX) 500 MG capsule Take 1 capsule by mouth in the morning and 1 capsule at noon and 1 capsule in the evening. Do all this for 10 days.    simvastatin (ZOCOR) 20 MG tablet TAKE 1 TABLET BY MOUTH DAILY    metoPROLOL tartrate (LOPRESSOR) 25 MG tablet TAKE 1 TABLET BY MOUTH TWICE DAILY AS DIRECTED BY PRESCRIBER    hydroCHLOROthiazide 25 MG tablet TAKE 1 TABLET BY MOUTH DAILY    lisinopril (ZESTRIL) 30 MG tablet TAKE 1 TABLET BY MOUTH DAILY    estradiol (ESTRACE) 0.1 MG/GM vaginal cream 2 days a week.    Cranberry (Ellura) 200 MG Cap Take 200 mg by mouth daily.    omeprazole (PriLOSEC) 40 MG capsule TAKE 1 CAPSULE BY MOUTH DAILY    furosemide (LASIX) 20 MG tablet TAKE 1 TABLET BY MOUTH DAILY (Patient taking differently: Take 20 mg by mouth as needed. Indications: as needed only)    OneTouch Verio test strip TEST THREE TIMES DAILY    apixaBAN  (Eliquis) 5 MG Tab Take 1 tablet by mouth every 12 hours.    Insulin Lispro, 1 Unit Dial, (HumaLOG KwikPen) 100 UNIT/ML pen-injector Indications: Type 2 Diabetes Inject 3 to 10 units per sliding scale before each meal. Prime 2 units before each dose. (Patient taking differently: 3-10 Units  in the morning and 3-10 Units in the evening. Take before meals. Indications: Type 2 Diabetes. Inject 3 to 10 units per sliding scale before each meal. Prime 2 units before each dose.)    Cholecalciferol (Vitamin D3) 50 mcg (2,000 units) tablet Take 50 mcg by mouth daily.    Multiple Vitamins-Minerals (PreserVision AREDS 2+Multi Vit) Cap Take 1 capsule by mouth daily.    insulin glargine 100 UNIT/ML pen-injector Inject 30 Units into the skin nightly. Indications: Type 2 Diabetes Do not start before December 23, 2023. Prime 2 units before each dose.    Lancets 30G Misc Use tid    Insulin Pen Needle 32G X 4 MM Misc Use to inject insulin a time daily. Remove needle cover(s) to expose needle before injecting.    Insulin Syringe-Needle U-100 (Sure Comfort Insulin Syringe) 31G X 5/16\" 1 ML Misc USE AS DIRECTED BY YOUR PHYSICIAN FOR DAILY INSULIN ADMINISTRATION    Coenzyme Q10 200 MG Cap Take 200 mg by mouth daily.     No current facility-administered medications for this visit.       ALLERGIES:  ALLERGIES:   Allergen Reactions    Cleocin GI UPSET     clindamycin    Ativan Hallucinations     Hallucinations, agitation    Baclofen Hallucinations    Lorazepam Other (See Comments)       PAST SURGICAL HISTORY:  Past Surgical History:   Procedure Laterality Date    Angioplasty      Ankle debridement      Carpal tunnel release      Cataract extraction, bilateral      Cholecystectomy      Foot/toes surgery proc unlisted         FAMILY HISTORY:  Family History   Problem Relation Age of Onset    Heart disease Mother     Other Mother         AVR    Diabetes Mother     Kidney disease Mother     Patient is unaware of any medical problems Father          no known CAD    Heart disease Sister     Heart disease Brother     Cancer Brother        SOCIAL HISTORY:  Social History     Tobacco Use    Smoking status: Never    Smokeless tobacco: Never   Vaping Use    Vaping status: never used   Substance Use Topics    Alcohol use: Never    Drug use: Never       Review of Systems      OBJECTIVE:     Physical Exam  Vitals and nursing note reviewed.   HENT:      Head: Normocephalic and atraumatic.   Cardiovascular:      Rate and Rhythm: Normal rate. Rhythm irregular.   Pulmonary:      Effort: Pulmonary effort is normal.      Breath sounds: No wheezing.   Musculoskeletal:      Right lower leg: Edema present.      Left lower leg: Edema present.   Skin:     Comments: Erythema and scabs bilateral lower ext - right > left, warmth on the right    Neurological:      General: No focal deficit present.      Mental Status: She is alert. Mental status is at baseline.   Psychiatric:         Mood and Affect: Mood normal.         Behavior: Behavior normal.         Thought Content: Thought content normal.         Visit Vitals  /80   Pulse 91   Temp 98 °F (36.7 °C) (Temporal)   Resp 16   Ht 5' 6\" (1.676 m)   LMP  (LMP Unknown)   SpO2 97%   BMI 42.45 kg/m²         Wt Readings from Last 1 Encounters:   12/10/24 119.3 kg (263 lb)          Assessment AND PLAN:     This is a 86 year old year-old female who presents with     Diagnoses and all orders for this visit:  UTI symptoms  -     POCT Urine Dip Auto  -     Urine, Bacterial Culture  Bilateral leg edema  Cellulitis of right lower extremity  -     cephalexin (KEFLEX) 500 MG capsule; Take 1 capsule by mouth in the morning and 1 capsule at noon and 1 capsule in the evening. Do all this for 10 days.  Rec take antibiotics  Take furosemide x 3 days   Use topical moisturizer   Get urine culture     No follow-ups on file.    The patient and  indicated understanding of the diagnosis and agreed with the plan of care.      Chelsea Singh,  DO  12/24/2024   yes

## 2025-01-02 ENCOUNTER — NON-APPOINTMENT (OUTPATIENT)
Age: 60
End: 2025-01-02

## 2025-01-02 DIAGNOSIS — Z01.818 ENCOUNTER FOR OTHER PREPROCEDURAL EXAMINATION: ICD-10-CM

## 2025-01-27 RX ORDER — PATIROMER 8.4 G/1
8.4 POWDER, FOR SUSPENSION ORAL
Qty: 52 | Refills: 3 | Status: ACTIVE | COMMUNITY
Start: 2025-01-27 | End: 1900-01-01

## 2025-01-28 RX ORDER — SODIUM ZIRCONIUM CYCLOSILICATE 10 G/10G
10 POWDER, FOR SUSPENSION ORAL
Qty: 30 | Refills: 3 | Status: ACTIVE | COMMUNITY
Start: 2025-01-25 | End: 1900-01-01

## 2025-02-07 NOTE — PROGRESS NOTE ADULT - SUBJECTIVE AND OBJECTIVE BOX
Patient is a 58y old  Female who presents with a chief complaint of bradycardia (26 Mar 2024 14:04)    Date of servie : 03-26-24 @ 15:13  INTERVAL HPI/OVERNIGHT EVENTS:  T(C): 36.8 (03-26-24 @ 14:00), Max: 36.9 (03-25-24 @ 18:58)  HR: 57 (03-26-24 @ 14:00) (48 - 67)  BP: 150/57 (03-26-24 @ 14:00) (124/62 - 184/69)  RR: 17 (03-26-24 @ 14:00) (16 - 19)  SpO2: 95% (03-26-24 @ 14:00) (92% - 100%)  Wt(kg): --  I&O's Summary      LABS:                        9.8    5.63  )-----------( 125      ( 26 Mar 2024 10:00 )             32.3     03-26    139  |  105  |  48<H>  ----------------------------<  109<H>  5.0   |  21<L>  |  3.38<H>    Ca    9.2      26 Mar 2024 10:00  Phos  4.9     03-25  Mg     2.20     03-25    TPro  8.1  /  Alb  4.2  /  TBili  0.5  /  DBili  x   /  AST  23  /  ALT  22  /  AlkPhos  377<H>  03-26    PT/INR - ( 26 Mar 2024 10:00 )   PT: 11.1 sec;   INR: 0.99 ratio         PTT - ( 26 Mar 2024 10:00 )  PTT:39.0 sec  Urinalysis Basic - ( 26 Mar 2024 10:00 )    Color: x / Appearance: x / SG: x / pH: x  Gluc: 109 mg/dL / Ketone: x  / Bili: x / Urobili: x   Blood: x / Protein: x / Nitrite: x   Leuk Esterase: x / RBC: x / WBC x   Sq Epi: x / Non Sq Epi: x / Bacteria: x      CAPILLARY BLOOD GLUCOSE      POCT Blood Glucose.: 223 mg/dL (26 Mar 2024 12:28)  POCT Blood Glucose.: 112 mg/dL (26 Mar 2024 08:41)  POCT Blood Glucose.: 196 mg/dL (26 Mar 2024 03:28)  POCT Blood Glucose.: 146 mg/dL (25 Mar 2024 23:45)  POCT Blood Glucose.: 115 mg/dL (25 Mar 2024 23:17)  POCT Blood Glucose.: 61 mg/dL (25 Mar 2024 22:13)  POCT Blood Glucose.: 57 mg/dL (25 Mar 2024 22:11)  POCT Blood Glucose.: 386 mg/dL (25 Mar 2024 18:55)  POCT Blood Glucose.: 305 mg/dL (25 Mar 2024 15:25)        Urinalysis Basic - ( 26 Mar 2024 10:00 )    Color: x / Appearance: x / SG: x / pH: x  Gluc: 109 mg/dL / Ketone: x  / Bili: x / Urobili: x   Blood: x / Protein: x / Nitrite: x   Leuk Esterase: x / RBC: x / WBC x   Sq Epi: x / Non Sq Epi: x / Bacteria: x        MEDICATIONS  (STANDING):  artificial  tears Solution 1 Drop(s) Both EYES two times a day  dextrose 5%. 1000 milliLiter(s) (50 mL/Hr) IV Continuous <Continuous>  dextrose 5%. 1000 milliLiter(s) (100 mL/Hr) IV Continuous <Continuous>  dextrose 50% Injectable 25 Gram(s) IV Push once  dextrose 50% Injectable 12.5 Gram(s) IV Push once  dextrose 50% Injectable 25 Gram(s) IV Push once  ferrous    sulfate 325 milliGRAM(s) Oral daily  glucagon  Injectable 1 milliGRAM(s) IntraMuscular once  heparin   Injectable 5000 Unit(s) SubCutaneous every 12 hours  hydrALAZINE 50 milliGRAM(s) Oral three times a day  insulin lispro (ADMELOG) corrective regimen sliding scale   SubCutaneous three times a day before meals  insulin lispro (ADMELOG) corrective regimen sliding scale   SubCutaneous at bedtime  levothyroxine 125 MICROGram(s) Oral daily  multivitamin 1 Tablet(s) Oral daily  NIFEdipine XL 30 milliGRAM(s) Oral daily  torsemide 60 milliGRAM(s) Oral daily    MEDICATIONS  (PRN):  dextrose Oral Gel 15 Gram(s) Oral once PRN Blood Glucose LESS THAN 70 milliGRAM(s)/deciliter          PHYSICAL EXAM:  GENERAL: NAD, well-groomed, well-developed  HEAD:  Atraumatic, Normocephalic  CHEST/LUNG: Clear to percussion bilaterally; No rales, rhonchi, wheezing, or rubs  HEART: Regular rate and rhythm; No murmurs, rubs, or gallops  ABDOMEN: Soft, Nontender, Nondistended; Bowel sounds present  EXTREMITIES:  2+ Peripheral Pulses, No clubbing, cyanosis, or edema  LYMPH: No lymphadenopathy noted  SKIN: No rashes or lesions    Care Discussed with Consultants/Other Providers [ ] YES  [ ] NO
Patient is a 58y old  Female who presents with a chief complaint of bradycardia (29 Mar 2024 10:47)    Date of servie : 03-29-24 @ 14:01  INTERVAL HPI/OVERNIGHT EVENTS:  T(C): 36.9 (03-29-24 @ 11:36), Max: 36.9 (03-28-24 @ 15:15)  HR: 60 (03-29-24 @ 11:36) (60 - 69)  BP: 152/69 (03-29-24 @ 11:36) (150/71 - 174/85)  RR: 16 (03-29-24 @ 11:36) (16 - 18)  SpO2: 99% (03-29-24 @ 11:36) (94% - 100%)  Wt(kg): --  I&O's Summary    28 Mar 2024 07:01  -  29 Mar 2024 07:00  --------------------------------------------------------  IN: 400 mL / OUT: 3400 mL / NET: -3000 mL        LABS:                        9.7    5.74  )-----------( 125      ( 29 Mar 2024 05:05 )             31.0     03-29    137  |  100  |  22  ----------------------------<  116<H>  4.3   |  24  |  1.95<H>    Ca    8.9      29 Mar 2024 05:05  Phos  3.9     03-29  Mg     1.90     03-29    TPro  6.9  /  Alb  3.5  /  TBili  0.3  /  DBili  x   /  AST  17  /  ALT  16  /  AlkPhos  317<H>  03-28      Urinalysis Basic - ( 29 Mar 2024 05:05 )    Color: x / Appearance: x / SG: x / pH: x  Gluc: 116 mg/dL / Ketone: x  / Bili: x / Urobili: x   Blood: x / Protein: x / Nitrite: x   Leuk Esterase: x / RBC: x / WBC x   Sq Epi: x / Non Sq Epi: x / Bacteria: x      CAPILLARY BLOOD GLUCOSE      POCT Blood Glucose.: 209 mg/dL (29 Mar 2024 12:05)  POCT Blood Glucose.: 110 mg/dL (29 Mar 2024 08:52)  POCT Blood Glucose.: 305 mg/dL (28 Mar 2024 22:42)  POCT Blood Glucose.: 267 mg/dL (28 Mar 2024 17:55)        Urinalysis Basic - ( 29 Mar 2024 05:05 )    Color: x / Appearance: x / SG: x / pH: x  Gluc: 116 mg/dL / Ketone: x  / Bili: x / Urobili: x   Blood: x / Protein: x / Nitrite: x   Leuk Esterase: x / RBC: x / WBC x   Sq Epi: x / Non Sq Epi: x / Bacteria: x        MEDICATIONS  (STANDING):  artificial  tears Solution 1 Drop(s) Both EYES two times a day  chlorhexidine 2% Cloths 1 Application(s) Topical daily  dextrose 5%. 1000 milliLiter(s) (50 mL/Hr) IV Continuous <Continuous>  dextrose 5%. 1000 milliLiter(s) (100 mL/Hr) IV Continuous <Continuous>  dextrose 50% Injectable 12.5 Gram(s) IV Push once  dextrose 50% Injectable 25 Gram(s) IV Push once  dextrose 50% Injectable 25 Gram(s) IV Push once  epoetin sarah (EPOGEN) Injectable 4000 Unit(s) IV Push <User Schedule>  ferrous    sulfate 325 milliGRAM(s) Oral daily  glucagon  Injectable 1 milliGRAM(s) IntraMuscular once  heparin   Injectable 5000 Unit(s) SubCutaneous every 12 hours  hydrALAZINE 50 milliGRAM(s) Oral three times a day  insulin lispro (ADMELOG) corrective regimen sliding scale   SubCutaneous at bedtime  insulin lispro (ADMELOG) corrective regimen sliding scale   SubCutaneous three times a day before meals  levothyroxine 125 MICROGram(s) Oral daily  multivitamin 1 Tablet(s) Oral daily  NIFEdipine XL 30 milliGRAM(s) Oral daily  torsemide 60 milliGRAM(s) Oral daily    MEDICATIONS  (PRN):  dextrose Oral Gel 15 Gram(s) Oral once PRN Blood Glucose LESS THAN 70 milliGRAM(s)/deciliter          PHYSICAL EXAM:  GENERAL: NAD, well-groomed, well-developed  HEAD:  Atraumatic, Normocephalic  CHEST/LUNG: Clear to percussion bilaterally; No rales, rhonchi, wheezing, or rubs  HEART: Regular rate and rhythm; No murmurs, rubs, or gallops  ABDOMEN: Soft, Nontender, Nondistended; Bowel sounds present  EXTREMITIES:  2+ Peripheral Pulses, No clubbing, cyanosis, or edema  LYMPH: No lymphadenopathy noted  SKIN: No rashes or lesions    Care Discussed with Consultants/Other Providers [ ] YES  [ ] NO
    Subjective: Patient seen and examined. No new events except as noted.     SUBJECTIVE/ROS:  No chest pain, dyspnea, palpitation, or dizziness.       MEDICATIONS:  MEDICATIONS  (STANDING):  artificial  tears Solution 1 Drop(s) Both EYES two times a day  chlorhexidine 2% Cloths 1 Application(s) Topical daily  dextrose 5%. 1000 milliLiter(s) (100 mL/Hr) IV Continuous <Continuous>  dextrose 5%. 1000 milliLiter(s) (50 mL/Hr) IV Continuous <Continuous>  dextrose 50% Injectable 12.5 Gram(s) IV Push once  dextrose 50% Injectable 25 Gram(s) IV Push once  dextrose 50% Injectable 25 Gram(s) IV Push once  epoetin sarah (EPOGEN) Injectable 4000 Unit(s) IV Push <User Schedule>  ferrous    sulfate 325 milliGRAM(s) Oral daily  glucagon  Injectable 1 milliGRAM(s) IntraMuscular once  heparin   Injectable 5000 Unit(s) SubCutaneous every 12 hours  hydrALAZINE 50 milliGRAM(s) Oral three times a day  insulin lispro (ADMELOG) corrective regimen sliding scale   SubCutaneous three times a day before meals  insulin lispro (ADMELOG) corrective regimen sliding scale   SubCutaneous at bedtime  levothyroxine 125 MICROGram(s) Oral daily  multivitamin 1 Tablet(s) Oral daily  NIFEdipine XL 30 milliGRAM(s) Oral daily  torsemide 60 milliGRAM(s) Oral daily      PHYSICAL EXAM:  T(C): 36.9 (03-29-24 @ 06:00), Max: 36.9 (03-28-24 @ 15:15)  HR: 62 (03-29-24 @ 06:00) (61 - 69)  BP: 150/71 (03-29-24 @ 06:00) (150/71 - 174/85)  RR: 16 (03-29-24 @ 06:00) (16 - 18)  SpO2: 100% (03-29-24 @ 06:00) (94% - 100%)  Wt(kg): --  I&O's Summary    28 Mar 2024 07:01  -  29 Mar 2024 07:00  --------------------------------------------------------  IN: 400 mL / OUT: 3400 mL / NET: -3000 mL            JVP: Normal  Neck: supple  Lung: clear   CV: S1 S2 , Murmur:  Abd: soft  Ext: No edema  neuro: Awake / alert  Psych: flat affect  Skin: normal``    LABS/DATA:    CARDIAC MARKERS:                                9.7    5.74  )-----------( 125      ( 29 Mar 2024 05:05 )             31.0     03-28    136  |  99  |  38<H>  ----------------------------<  163<H>  4.3   |  22  |  2.60<H>    Ca    8.7      28 Mar 2024 04:56    TPro  6.9  /  Alb  3.5  /  TBili  0.3  /  DBili  x   /  AST  17  /  ALT  16  /  AlkPhos  317<H>  03-28    proBNP:   Lipid Profile:   HgA1c:   TSH:     TELE:  EKG:        
    Subjective: Patient seen and examined. No new events except as noted.     SUBJECTIVE/ROS:  No chest pain, dyspnea, palpitation, or dizziness.       MEDICATIONS:  MEDICATIONS  (STANDING):  artificial  tears Solution 1 Drop(s) Both EYES two times a day  dextrose 5%. 1000 milliLiter(s) (50 mL/Hr) IV Continuous <Continuous>  dextrose 5%. 1000 milliLiter(s) (100 mL/Hr) IV Continuous <Continuous>  dextrose 50% Injectable 25 Gram(s) IV Push once  dextrose 50% Injectable 25 Gram(s) IV Push once  dextrose 50% Injectable 12.5 Gram(s) IV Push once  epoetin sarah (EPOGEN) Injectable 4000 Unit(s) IV Push <User Schedule>  ferrous    sulfate 325 milliGRAM(s) Oral daily  glucagon  Injectable 1 milliGRAM(s) IntraMuscular once  heparin   Injectable 5000 Unit(s) SubCutaneous every 12 hours  hydrALAZINE 50 milliGRAM(s) Oral three times a day  insulin lispro (ADMELOG) corrective regimen sliding scale   SubCutaneous three times a day before meals  insulin lispro (ADMELOG) corrective regimen sliding scale   SubCutaneous at bedtime  levothyroxine 125 MICROGram(s) Oral daily  multivitamin 1 Tablet(s) Oral daily  NIFEdipine XL 30 milliGRAM(s) Oral daily  torsemide 60 milliGRAM(s) Oral daily      PHYSICAL EXAM:  T(C): 36.2 (03-27-24 @ 05:36), Max: 36.8 (03-26-24 @ 14:00)  HR: 57 (03-27-24 @ 06:36) (57 - 69)  BP: 167/61 (03-27-24 @ 06:36) (150/57 - 186/70)  RR: 18 (03-27-24 @ 05:36) (17 - 18)  SpO2: 97% (03-27-24 @ 05:36) (92% - 98%)  Wt(kg): --  I&O's Summary    26 Mar 2024 07:01  -  27 Mar 2024 07:00  --------------------------------------------------------  IN: 640 mL / OUT: 3400 mL / NET: -2760 mL        Weight (kg): 80.1 (03-27 @ 06:39)      JVP: Normal  Neck: supple  Lung: clear   CV: S1 S2 , Murmur:  Abd: soft  Ext: No edema  neuro: Awake / alert  Psych: flat affect  Skin: normal``    LABS/DATA:    CARDIAC MARKERS:      < from: TTE W or WO Ultrasound Enhancing Agent (03.26.24 @ 11:46) >  CONCLUSIONS:      1. Left ventricular cavity is normal in size. Left ventricular systolic function is normal with an ejection fraction of 69 % by Macedo's method of disks. There are no regional wall motion abnormalities seen.   2. There is moderate (grade 2) left ventricular diastolic dysfunction, with elevated filling pressure.   3. Mild left ventricular hypertrophy.   4. There is increased LV mass and concentric hypertrophy.   5. Normal right ventricular cavity size and normal systolic function.   6. The left atrium is moderately dilated.   7. No significant valvular disease.   8. Estimated pulmonary artery systolic pressure is 47 mmHg, consistent with moderate pulmonary hypertension.   9. The inferior vena cava is normal in size measuring 1.50 cm in diameter, (normal <2.1cm) with normal inspiratory collapse (normal >50%) consistent with normal right atrial pressure (~3, range 0-5mmHg).  10. No pericardial effusion seen.    ________________________________________________________________________________________    < end of copied text >                            9.8    5.63  )-----------( 125      ( 26 Mar 2024 10:00 )             32.3     03-26    139  |  105  |  48<H>  ----------------------------<  109<H>  5.0   |  21<L>  |  3.38<H>    Ca    9.2      26 Mar 2024 10:00  Phos  4.9     03-25  Mg     2.20     03-25    TPro  8.1  /  Alb  4.2  /  TBili  0.5  /  DBili  x   /  AST  23  /  ALT  22  /  AlkPhos  377<H>  03-26    proBNP:   Lipid Profile:   HgA1c:   TSH:     TELE:  EKG:        
    Subjective: Patient seen and examined. No new events except as noted.     SUBJECTIVE/ROS:  nad      MEDICATIONS:  MEDICATIONS  (STANDING):  artificial  tears Solution 1 Drop(s) Both EYES two times a day  dextrose 5%. 1000 milliLiter(s) (100 mL/Hr) IV Continuous <Continuous>  dextrose 5%. 1000 milliLiter(s) (50 mL/Hr) IV Continuous <Continuous>  dextrose 50% Injectable 12.5 Gram(s) IV Push once  dextrose 50% Injectable 25 Gram(s) IV Push once  dextrose 50% Injectable 25 Gram(s) IV Push once  epoetin sarah (EPOGEN) Injectable 4000 Unit(s) IV Push <User Schedule>  ferrous    sulfate 325 milliGRAM(s) Oral daily  glucagon  Injectable 1 milliGRAM(s) IntraMuscular once  heparin   Injectable 5000 Unit(s) SubCutaneous every 12 hours  hydrALAZINE 50 milliGRAM(s) Oral three times a day  insulin lispro (ADMELOG) corrective regimen sliding scale   SubCutaneous three times a day before meals  insulin lispro (ADMELOG) corrective regimen sliding scale   SubCutaneous at bedtime  levothyroxine 125 MICROGram(s) Oral daily  multivitamin 1 Tablet(s) Oral daily  NIFEdipine XL 30 milliGRAM(s) Oral daily  torsemide 60 milliGRAM(s) Oral daily      PHYSICAL EXAM:  T(C): 36.8 (03-28-24 @ 06:30), Max: 36.8 (03-28-24 @ 06:30)  HR: 67 (03-28-24 @ 06:30) (58 - 68)  BP: 164/67 (03-28-24 @ 06:30) (150/56 - 176/74)  RR: 17 (03-28-24 @ 06:30) (17 - 19)  SpO2: 100% (03-28-24 @ 06:30) (97% - 100%)  Wt(kg): --  I&O's Summary          JVP: Normal  Neck: supple  Lung: clear   CV: S1 S2 , Murmur:  Abd: soft  Ext: No edema  neuro: Awake / alert  Psych: flat affect  Skin: normal``    LABS/DATA:    CARDIAC MARKERS:                                9.2    5.90  )-----------( 128      ( 28 Mar 2024 04:56 )             29.5     03-28    136  |  99  |  38<H>  ----------------------------<  163<H>  4.3   |  22  |  2.60<H>    Ca    8.7      28 Mar 2024 04:56    TPro  6.9  /  Alb  3.5  /  TBili  0.3  /  DBili  x   /  AST  17  /  ALT  16  /  AlkPhos  317<H>  03-28    proBNP:   Lipid Profile:   HgA1c:   TSH:     TELE:  EKG:        
Overnight events:   - No acute events    SUBJECTIVE:  Patient was seen and examined on AM rounds. Denies new complaints.    OBJECTIVE:  Vital Signs Last 24 Hrs  T(C): 36.9 (29 Mar 2024 06:00), Max: 36.9 (28 Mar 2024 15:15)  T(F): 98.5 (29 Mar 2024 06:00), Max: 98.5 (28 Mar 2024 15:15)  HR: 62 (29 Mar 2024 06:00) (61 - 69)  BP: 150/71 (29 Mar 2024 06:00) (150/71 - 174/85)  RR: 16 (29 Mar 2024 06:00) (16 - 18)  SpO2: 100% (29 Mar 2024 06:00) (94% - 100%)    Parameters below as of 29 Mar 2024 06:00  Patient On (Oxygen Delivery Method): room air      03-28-24 @ 07:01  -  03-29-24 @ 07:00  --------------------------------------------------------  IN: 400 mL / OUT: 3400 mL / NET: -3000 mL      Physical Examination:  Constitutional: well developed, well nourished, NAD, AOx3  Respiratory: unlabored respirations  Cardiovascular: RRR  Gastrointestinal: abdomen soft, nontender, nondistended  Extremities: FROM, warm  Neurological: intact, non-focal  Skin: no gross lesions  Musculoskeletal: equal strength bilateral upper and lower extremities        LABS:                        9.7    5.74  )-----------( 125      ( 29 Mar 2024 05:05 )             31.0       03-29    137  |  100  |  22  ----------------------------<  116<H>  4.3   |  24  |  1.95<H>    Ca    8.9      29 Mar 2024 05:05  Phos  3.9     03-29  Mg     1.90     03-29    TPro  6.9  /  Alb  3.5  /  TBili  0.3  /  DBili  x   /  AST  17  /  ALT  16  /  AlkPhos  317<H>  03-28      
[At ___ Weeks Gestation] : at [unfilled] weeks gestation
Overnight events:   - No acute events    SUBJECTIVE:  Patient was seen and examined on AM rounds. Denies new complaints.     OBJECTIVE:  Vital Signs Last 24 Hrs  T(C): 36.8 (28 Mar 2024 06:30), Max: 36.8 (28 Mar 2024 06:30)  T(F): 98.3 (28 Mar 2024 06:30), Max: 98.3 (28 Mar 2024 06:30)  HR: 67 (28 Mar 2024 06:30) (58 - 68)  BP: 164/67 (28 Mar 2024 06:30) (150/56 - 176/74)  BP(mean): --  RR: 17 (28 Mar 2024 06:30) (17 - 19)  SpO2: 100% (28 Mar 2024 06:30) (97% - 100%)    Parameters below as of 28 Mar 2024 06:30  Patient On (Oxygen Delivery Method): room air      Physical Examination:  Constitutional: well developed, well nourished, NAD, AOx3  Respiratory: unlabored respirations  Cardiovascular: RRR  Gastrointestinal: abdomen soft, nontender, nondistended  Extremities: FROM, warm  Neurological: intact, non-focal  Skin: no gross lesions  Musculoskeletal: equal strength bilateral upper and lower extremities        LABS:                        9.2    5.90  )-----------( 128      ( 28 Mar 2024 04:56 )             29.5       03-28    136  |  99  |  38<H>  ----------------------------<  163<H>  4.3   |  22  |  2.60<H>    Ca    8.7      28 Mar 2024 04:56    TPro  6.9  /  Alb  3.5  /  TBili  0.3  /  DBili  x   /  AST  17  /  ALT  16  /  AlkPhos  317<H>  03-28      
Patient is a 58y old  Female who presents with a chief complaint of bradycardia (27 Mar 2024 13:28)    Date of servie : 03-27-24 @ 16:00  INTERVAL HPI/OVERNIGHT EVENTS:  T(C): 36.5 (03-27-24 @ 12:45), Max: 36.6 (03-26-24 @ 23:50)  HR: 66 (03-27-24 @ 12:45) (57 - 69)  BP: 174/73 (03-27-24 @ 12:45) (150/56 - 186/70)  RR: 18 (03-27-24 @ 12:45) (18 - 18)  SpO2: 98% (03-27-24 @ 12:45) (97% - 100%)  Wt(kg): --  I&O's Summary    26 Mar 2024 07:01  -  27 Mar 2024 07:00  --------------------------------------------------------  IN: 640 mL / OUT: 3400 mL / NET: -2760 mL        LABS:                        9.9    5.28  )-----------( 127      ( 27 Mar 2024 06:08 )             32.2     03-27    137  |  99  |  20  ----------------------------<  84  4.1   |  25  |  1.81<H>    Ca    9.1      27 Mar 2024 06:08  Phos  4.9     03-25  Mg     2.20     03-25    TPro  7.8  /  Alb  3.9  /  TBili  0.5  /  DBili  x   /  AST  23  /  ALT  21  /  AlkPhos  375<H>  03-27    PT/INR - ( 26 Mar 2024 10:00 )   PT: 11.1 sec;   INR: 0.99 ratio         PTT - ( 27 Mar 2024 06:08 )  PTT:37.9 sec  Urinalysis Basic - ( 27 Mar 2024 06:08 )    Color: x / Appearance: x / SG: x / pH: x  Gluc: 84 mg/dL / Ketone: x  / Bili: x / Urobili: x   Blood: x / Protein: x / Nitrite: x   Leuk Esterase: x / RBC: x / WBC x   Sq Epi: x / Non Sq Epi: x / Bacteria: x      CAPILLARY BLOOD GLUCOSE      POCT Blood Glucose.: 205 mg/dL (27 Mar 2024 12:24)  POCT Blood Glucose.: 157 mg/dL (27 Mar 2024 08:44)  POCT Blood Glucose.: 97 mg/dL (27 Mar 2024 07:38)  POCT Blood Glucose.: 114 mg/dL (26 Mar 2024 23:25)  POCT Blood Glucose.: 267 mg/dL (26 Mar 2024 17:20)        Urinalysis Basic - ( 27 Mar 2024 06:08 )    Color: x / Appearance: x / SG: x / pH: x  Gluc: 84 mg/dL / Ketone: x  / Bili: x / Urobili: x   Blood: x / Protein: x / Nitrite: x   Leuk Esterase: x / RBC: x / WBC x   Sq Epi: x / Non Sq Epi: x / Bacteria: x        MEDICATIONS  (STANDING):  artificial  tears Solution 1 Drop(s) Both EYES two times a day  dextrose 5%. 1000 milliLiter(s) (100 mL/Hr) IV Continuous <Continuous>  dextrose 5%. 1000 milliLiter(s) (50 mL/Hr) IV Continuous <Continuous>  dextrose 50% Injectable 12.5 Gram(s) IV Push once  dextrose 50% Injectable 25 Gram(s) IV Push once  dextrose 50% Injectable 25 Gram(s) IV Push once  epoetin sarah (EPOGEN) Injectable 4000 Unit(s) IV Push <User Schedule>  ferrous    sulfate 325 milliGRAM(s) Oral daily  glucagon  Injectable 1 milliGRAM(s) IntraMuscular once  heparin   Injectable 5000 Unit(s) SubCutaneous every 12 hours  hydrALAZINE 50 milliGRAM(s) Oral three times a day  insulin lispro (ADMELOG) corrective regimen sliding scale   SubCutaneous three times a day before meals  insulin lispro (ADMELOG) corrective regimen sliding scale   SubCutaneous at bedtime  levothyroxine 125 MICROGram(s) Oral daily  multivitamin 1 Tablet(s) Oral daily  NIFEdipine XL 30 milliGRAM(s) Oral daily  torsemide 60 milliGRAM(s) Oral daily    MEDICATIONS  (PRN):  dextrose Oral Gel 15 Gram(s) Oral once PRN Blood Glucose LESS THAN 70 milliGRAM(s)/deciliter          PHYSICAL EXAM:  GENERAL: NAD, well-groomed, well-developed  HEAD:  Atraumatic, Normocephalic  CHEST/LUNG: Clear to percussion bilaterally; No rales, rhonchi, wheezing, or rubs  HEART: Regular rate and rhythm; No murmurs, rubs, or gallops  ABDOMEN: Soft, Nontender, Nondistended; Bowel sounds present  EXTREMITIES:  2+ Peripheral Pulses, No clubbing, cyanosis, or edema  LYMPH: No lymphadenopathy noted  SKIN: No rashes or lesions    Care Discussed with Consultants/Other Providers [ ] YES  [ ] NO
Patient is a 58y old  Female who presents with a chief complaint of bradycardia (28 Mar 2024 11:17)    Date of servie : 03-28-24 @ 16:33  INTERVAL HPI/OVERNIGHT EVENTS:  T(C): 36.4 (03-28-24 @ 10:48), Max: 36.8 (03-28-24 @ 06:30)  HR: 65 (03-28-24 @ 10:48) (61 - 68)  BP: 174/70 (03-28-24 @ 10:48) (150/95 - 176/74)  RR: 18 (03-28-24 @ 10:48) (17 - 19)  SpO2: 98% (03-28-24 @ 10:48) (97% - 100%)  Wt(kg): --  I&O's Summary    28 Mar 2024 07:01  -  28 Mar 2024 16:33  --------------------------------------------------------  IN: 400 mL / OUT: 3400 mL / NET: -3000 mL        LABS:                        9.2    5.90  )-----------( 128      ( 28 Mar 2024 04:56 )             29.5     03-28    136  |  99  |  38<H>  ----------------------------<  163<H>  4.3   |  22  |  2.60<H>    Ca    8.7      28 Mar 2024 04:56    TPro  6.9  /  Alb  3.5  /  TBili  0.3  /  DBili  x   /  AST  17  /  ALT  16  /  AlkPhos  317<H>  03-28    PTT - ( 27 Mar 2024 06:08 )  PTT:37.9 sec  Urinalysis Basic - ( 28 Mar 2024 04:56 )    Color: x / Appearance: x / SG: x / pH: x  Gluc: 163 mg/dL / Ketone: x  / Bili: x / Urobili: x   Blood: x / Protein: x / Nitrite: x   Leuk Esterase: x / RBC: x / WBC x   Sq Epi: x / Non Sq Epi: x / Bacteria: x      CAPILLARY BLOOD GLUCOSE      POCT Blood Glucose.: 266 mg/dL (28 Mar 2024 13:11)  POCT Blood Glucose.: 167 mg/dL (28 Mar 2024 10:46)  POCT Blood Glucose.: 164 mg/dL (28 Mar 2024 09:14)  POCT Blood Glucose.: 167 mg/dL (28 Mar 2024 05:44)  POCT Blood Glucose.: 320 mg/dL (27 Mar 2024 23:00)  POCT Blood Glucose.: 247 mg/dL (27 Mar 2024 17:40)        Urinalysis Basic - ( 28 Mar 2024 04:56 )    Color: x / Appearance: x / SG: x / pH: x  Gluc: 163 mg/dL / Ketone: x  / Bili: x / Urobili: x   Blood: x / Protein: x / Nitrite: x   Leuk Esterase: x / RBC: x / WBC x   Sq Epi: x / Non Sq Epi: x / Bacteria: x        MEDICATIONS  (STANDING):  artificial  tears Solution 1 Drop(s) Both EYES two times a day  chlorhexidine 2% Cloths 1 Application(s) Topical daily  dextrose 5%. 1000 milliLiter(s) (50 mL/Hr) IV Continuous <Continuous>  dextrose 5%. 1000 milliLiter(s) (100 mL/Hr) IV Continuous <Continuous>  dextrose 50% Injectable 25 Gram(s) IV Push once  dextrose 50% Injectable 25 Gram(s) IV Push once  dextrose 50% Injectable 12.5 Gram(s) IV Push once  epoetin sarah (EPOGEN) Injectable 4000 Unit(s) IV Push <User Schedule>  ferrous    sulfate 325 milliGRAM(s) Oral daily  glucagon  Injectable 1 milliGRAM(s) IntraMuscular once  heparin   Injectable 5000 Unit(s) SubCutaneous every 12 hours  hydrALAZINE 50 milliGRAM(s) Oral three times a day  insulin lispro (ADMELOG) corrective regimen sliding scale   SubCutaneous at bedtime  insulin lispro (ADMELOG) corrective regimen sliding scale   SubCutaneous three times a day before meals  levothyroxine 125 MICROGram(s) Oral daily  multivitamin 1 Tablet(s) Oral daily  NIFEdipine XL 30 milliGRAM(s) Oral daily  torsemide 60 milliGRAM(s) Oral daily    MEDICATIONS  (PRN):  dextrose Oral Gel 15 Gram(s) Oral once PRN Blood Glucose LESS THAN 70 milliGRAM(s)/deciliter          PHYSICAL EXAM:  GENERAL: NAD, well-groomed, well-developed  HEAD:  Atraumatic, Normocephalic  CHEST/LUNG: Clear to percussion bilaterally; No rales, rhonchi, wheezing, or rubs  HEART: Regular rate and rhythm; No murmurs, rubs, or gallops  ABDOMEN: Soft, Nontender, Nondistended; Bowel sounds present  EXTREMITIES:  2+ Peripheral Pulses, No clubbing, cyanosis, or edema  LYMPH: No lymphadenopathy noted  SKIN: No rashes or lesions    Care Discussed with Consultants/Other Providers [ ] YES  [ ] NO
Patient is seen and examined. Patient denies any chest pain, SOB, palpitations or dizziness. C/O feeling tired    PAST MEDICAL & SURGICAL HISTORY:  Benign essential HTN    HTN (hypertension)    HLD (hyperlipidemia)    DM (diabetes mellitus)    Hypothyroid    H/O pulmonary hypertension    CKD (chronic kidney disease)    (HFpEF) heart failure with preserved ejection fraction    No significant past surgical history    S/P cataract surgery        MEDICATIONS  (STANDING):  artificial  tears Solution 1 Drop(s) Both EYES two times a day  dextrose 5%. 1000 milliLiter(s) (50 mL/Hr) IV Continuous <Continuous>  dextrose 5%. 1000 milliLiter(s) (100 mL/Hr) IV Continuous <Continuous>  dextrose 50% Injectable 12.5 Gram(s) IV Push once  dextrose 50% Injectable 25 Gram(s) IV Push once  dextrose 50% Injectable 25 Gram(s) IV Push once  epoetin sarah (EPOGEN) Injectable 4000 Unit(s) IV Push <User Schedule>  ferrous    sulfate 325 milliGRAM(s) Oral daily  glucagon  Injectable 1 milliGRAM(s) IntraMuscular once  heparin   Injectable 5000 Unit(s) SubCutaneous every 12 hours  hydrALAZINE 50 milliGRAM(s) Oral three times a day  insulin lispro (ADMELOG) corrective regimen sliding scale   SubCutaneous at bedtime  insulin lispro (ADMELOG) corrective regimen sliding scale   SubCutaneous three times a day before meals  levothyroxine 125 MICROGram(s) Oral daily  multivitamin 1 Tablet(s) Oral daily  NIFEdipine XL 30 milliGRAM(s) Oral daily  torsemide 60 milliGRAM(s) Oral daily    MEDICATIONS  (PRN):  dextrose Oral Gel 15 Gram(s) Oral once PRN Blood Glucose LESS THAN 70 milliGRAM(s)/deciliter            Vital Signs Last 24 Hrs  T(C): 34.9 (27 Mar 2024 10:22), Max: 36.8 (26 Mar 2024 14:00)  T(F): 94.8 (27 Mar 2024 10:22), Max: 98.2 (26 Mar 2024 14:00)  HR: 58 (27 Mar 2024 10:22) (57 - 69)  BP: 150/56 (27 Mar 2024 10:22) (150/56 - 186/70)  BP(mean): --  RR: 18 (27 Mar 2024 10:22) (17 - 18)  SpO2: 98% (27 Mar 2024 10:22) (95% - 100%)    Parameters below as of 27 Mar 2024 10:22  Patient On (Oxygen Delivery Method): room air    INTERPRETATION OF TELEMETRY: Sinus rhythm with HR 50s- 80s    LABS:                        9.9    5.28  )-----------( 127      ( 27 Mar 2024 06:08 )             32.2     03-27    137  |  99  |  20  ----------------------------<  84  4.1   |  25  |  1.81<H>    Ca    9.1      27 Mar 2024 06:08  Phos  4.9     03-25  Mg     2.20     03-25    TPro  7.8  /  Alb  3.9  /  TBili  0.5  /  DBili  x   /  AST  23  /  ALT  21  /  AlkPhos  375<H>  03-27        PT/INR - ( 26 Mar 2024 10:00 )   PT: 11.1 sec;   INR: 0.99 ratio         PTT - ( 27 Mar 2024 06:08 )  PTT:37.9 sec  Urinalysis Basic - ( 27 Mar 2024 06:08 )    Color: x / Appearance: x / SG: x / pH: x  Gluc: 84 mg/dL / Ketone: x  / Bili: x / Urobili: x   Blood: x / Protein: x / Nitrite: x   Leuk Esterase: x / RBC: x / WBC x   Sq Epi: x / Non Sq Epi: x / Bacteria: x      I&O's Summary    26 Mar 2024 07:01  -  27 Mar 2024 07:00  --------------------------------------------------------  IN: 640 mL / OUT: 3400 mL / NET: -2760 mL      PHYSICAL EXAM:    GENERAL: In no apparent distress, well nourished, and hydrated.  HEART: Regular rate and rhythm; No murmurs, rubs, or gallops.  PULMONARY: Clear to auscultation and percussion.  No rales, wheezing, or rhonchi bilaterally.  ABDOMEN: Soft, Nontender, Nondistended; Bowel sounds present  EXTREMITIES:  2+ Peripheral Pulses, No clubbing, cyanosis, or edema          
[ Section] : by  section
[facility] Division of Kidney Diseases & Hypertension  FOLLOW UP NOTE  [phone]--------------------------------------------------------------------------------    Chief Complaint: ESRD on HD    24 hour events/subjective:  Patient seen in the HD unit this am, tolerating HD well. Planned for AVF creation inpatient. Denies any headaches, fevers/chills, chest pain, palpitations, SOB, and leg swelling.    PAST HISTORY  --------------------------------------------------------------------------------  No significant changes to PMH, PSH, FHx, SHx, unless otherwise noted    ALLERGIES & MEDICATIONS  --------------------------------------------------------------------------------  Allergies    No Known Allergies    Intolerances      Standing Inpatient Medications  artificial  tears Solution 1 Drop(s) Both EYES two times a day  dextrose 5%. 1000 milliLiter(s) IV Continuous <Continuous>  dextrose 5%. 1000 milliLiter(s) IV Continuous <Continuous>  dextrose 50% Injectable 25 Gram(s) IV Push once  dextrose 50% Injectable 25 Gram(s) IV Push once  dextrose 50% Injectable 12.5 Gram(s) IV Push once  epoetin sarah (EPOGEN) Injectable 4000 Unit(s) IV Push <User Schedule>  ferrous    sulfate 325 milliGRAM(s) Oral daily  glucagon  Injectable 1 milliGRAM(s) IntraMuscular once  heparin   Injectable 5000 Unit(s) SubCutaneous every 12 hours  hydrALAZINE 50 milliGRAM(s) Oral three times a day  insulin lispro (ADMELOG) corrective regimen sliding scale   SubCutaneous at bedtime  insulin lispro (ADMELOG) corrective regimen sliding scale   SubCutaneous three times a day before meals  levothyroxine 125 MICROGram(s) Oral daily  multivitamin 1 Tablet(s) Oral daily  NIFEdipine XL 30 milliGRAM(s) Oral daily  torsemide 60 milliGRAM(s) Oral daily    PRN Inpatient Medications  dextrose Oral Gel 15 Gram(s) Oral once PRN      REVIEW OF SYSTEMS  --------------------------------------------------------------------------------  per above    VITALS/PHYSICAL EXAM  --------------------------------------------------------------------------------  T(C): 36.4 (03-28-24 @ 10:00), Max: 36.8 (03-28-24 @ 06:30)  HR: 61 (03-28-24 @ 10:00) (61 - 68)  BP: 150/95 (03-28-24 @ 10:00) (150/95 - 176/74)  RR: 17 (03-28-24 @ 10:00) (17 - 19)  SpO2: 100% (03-28-24 @ 10:00) (97% - 100%)  Wt(kg): --    Weight (kg): 59 (03-27-24 @ 07:32)      03-28-24 @ 07:01  -  03-28-24 @ 11:18  --------------------------------------------------------  IN: 400 mL / OUT: 3400 mL / NET: -3000 mL      Physical Exam:  	Gen: NAD  	HEENT: Anicteric  	Pulm: CTA B/L  	CV: S1S2+  	Abd: Soft, +BS           	Ext: No LE edema B/L  	Neuro: Awake  	Skin: Warm and dry  	Dialysis access: right tunneled IJ catheter    LABS/STUDIES  --------------------------------------------------------------------------------              9.2    5.90  >-----------<  128      [03-28-24 @ 04:56]              29.5     136  |  99  |  38  ----------------------------<  163      [03-28-24 @ 04:56]  4.3   |  22  |  2.60        Ca     8.7     [03-28-24 @ 04:56]    TPro  6.9  /  Alb  3.5  /  TBili  0.3  /  DBili  x   /  AST  17  /  ALT  16  /  AlkPhos  317  [03-28-24 @ 04:56]      PTT: 37.9       [03-27-24 @ 06:08]      Creatinine Trend:  SCr 2.60 [03-28 @ 04:56]  SCr 1.81 [03-27 @ 06:08]  SCr 3.38 [03-26 @ 10:00]  SCr 3.36 [03-25 @ 23:19]  SCr 3.27 [03-25 @ 17:19]    Urinalysis - [03-28-24 @ 04:56]      Color  / Appearance  / SG  / pH       Gluc 163 / Ketone   / Bili  / Urobili        Blood  / Protein  / Leuk Est  / Nitrite       RBC  / WBC  / Hyaline  / Gran  / Sq Epi  / Non Sq Epi  / Bacteria       TSH 4.66      [03-27-24 @ 06:08]      
[None] : No maternal complications
[facility] DIVISION OF KIDNEY DISEASES AND HYPERTENSION -- HEMODIALYSIS NOTE   Nehrology Office [phone]  available on [employer] teams--> Robbie Ovalle   --------------------------------------------------------------------------------  Chief Complaint: ESRD/Ongoing hemodialysis requirement  s/p HD yesterday. HR stable   24 hour events/subjective:      ALLERGIES & MEDICATIONS  --------------------------------------------------------------------------------  Allergies    No Known Allergies    Intolerances      Standing Inpatient Medications  artificial  tears Solution 1 Drop(s) Both EYES two times a day  dextrose 5%. 1000 milliLiter(s) IV Continuous <Continuous>  dextrose 5%. 1000 milliLiter(s) IV Continuous <Continuous>  dextrose 50% Injectable 12.5 Gram(s) IV Push once  dextrose 50% Injectable 25 Gram(s) IV Push once  dextrose 50% Injectable 25 Gram(s) IV Push once  epoetin sarah (EPOGEN) Injectable 4000 Unit(s) IV Push <User Schedule>  ferrous    sulfate 325 milliGRAM(s) Oral daily  glucagon  Injectable 1 milliGRAM(s) IntraMuscular once  heparin   Injectable 5000 Unit(s) SubCutaneous every 12 hours  hydrALAZINE 50 milliGRAM(s) Oral three times a day  insulin lispro (ADMELOG) corrective regimen sliding scale   SubCutaneous three times a day before meals  insulin lispro (ADMELOG) corrective regimen sliding scale   SubCutaneous at bedtime  levothyroxine 125 MICROGram(s) Oral daily  multivitamin 1 Tablet(s) Oral daily  NIFEdipine XL 30 milliGRAM(s) Oral daily  torsemide 60 milliGRAM(s) Oral daily    PRN Inpatient Medications  dextrose Oral Gel 15 Gram(s) Oral once PRN        VITALS/PHYSICAL EXAM  --------------------------------------------------------------------------------  T(C): 36.5 (03-27-24 @ 12:45), Max: 36.8 (03-26-24 @ 14:00)  HR: 66 (03-27-24 @ 12:45) (57 - 69)  BP: 174/73 (03-27-24 @ 12:45) (150/56 - 186/70)  RR: 18 (03-27-24 @ 12:45) (17 - 18)  SpO2: 98% (03-27-24 @ 12:45) (95% - 100%)  Wt(kg): --  Height (cm): 152.4 (03-25-24 @ 15:20)  Weight (kg): 59 (03-27-24 @ 07:32)  BMI (kg/m2): 25.4 (03-27-24 @ 07:32)  BSA (m2): 1.55 (03-27-24 @ 07:32)      03-26-24 @ 07:01  -  03-27-24 @ 07:00  --------------------------------------------------------  IN: 640 mL / OUT: 3400 mL / NET: -2760 mL      Physical Exam:  	Gen NAD  	HEENT: no JVD  	Pulm: CTABL  	CV: S1S2,  	Abd: Soft,   	Ext:  - edema B/L LE   	Neuro: Awake and alert  	Skin: Warm and dry          Vascular:  IJ tunneled HD catheter    LABS/STUDIES  --------------------------------------------------------------------------------              9.9    5.28  >-----------<  127      [03-27-24 @ 06:08]              32.2     137  |  99  |  20  ----------------------------<  84      [03-27-24 @ 06:08]  4.1   |  25  |  1.81        Ca     9.1     [03-27-24 @ 06:08]      Mg     2.20     [03-25-24 @ 23:19]      Phos  4.9     [03-25-24 @ 17:19]    TPro  7.8  /  Alb  3.9  /  TBili  0.5  /  DBili  x   /  AST  23  /  ALT  21  /  AlkPhos  375  [03-27-24 @ 06:08]    PT/INR: PT 11.1 , INR 0.99       [03-26-24 @ 10:00]  PTT: 37.9       [03-27-24 @ 06:08]      Iron 86, TIBC 269, %sat 32      [08-17-23 @ 04:54]  Ferritin 984      [08-17-23 @ 04:54]  TSH 4.66      [03-27-24 @ 06:08]    
[Passed] : passed
[facility] DIVISION OF KIDNEY DISEASES AND HYPERTENSION -- HEMODIALYSIS NOTE   Nehrology Office [phone]  available on [employer] teams--> Robbie Ovalle   --------------------------------------------------------------------------------  Chief Complaint: ESRD/Ongoing hemodialysis requirement  no sob. stress test ok   24 hour events/subjective:      ALLERGIES & MEDICATIONS  --------------------------------------------------------------------------------  Allergies    No Known Allergies    Intolerances      Standing Inpatient Medications  artificial  tears Solution 1 Drop(s) Both EYES two times a day  chlorhexidine 2% Cloths 1 Application(s) Topical daily  dextrose 5%. 1000 milliLiter(s) IV Continuous <Continuous>  dextrose 5%. 1000 milliLiter(s) IV Continuous <Continuous>  dextrose 50% Injectable 12.5 Gram(s) IV Push once  dextrose 50% Injectable 25 Gram(s) IV Push once  dextrose 50% Injectable 25 Gram(s) IV Push once  epoetin sarah (EPOGEN) Injectable 4000 Unit(s) IV Push <User Schedule>  ferrous    sulfate 325 milliGRAM(s) Oral daily  glucagon  Injectable 1 milliGRAM(s) IntraMuscular once  heparin   Injectable 5000 Unit(s) SubCutaneous every 12 hours  hydrALAZINE 50 milliGRAM(s) Oral three times a day  insulin lispro (ADMELOG) corrective regimen sliding scale   SubCutaneous at bedtime  insulin lispro (ADMELOG) corrective regimen sliding scale   SubCutaneous three times a day before meals  levothyroxine 125 MICROGram(s) Oral daily  multivitamin 1 Tablet(s) Oral daily  NIFEdipine XL 30 milliGRAM(s) Oral daily  torsemide 60 milliGRAM(s) Oral daily    PRN Inpatient Medications  dextrose Oral Gel 15 Gram(s) Oral once PRN      VITALS/PHYSICAL EXAM  --------------------------------------------------------------------------------  T(C): 36.9 (03-29-24 @ 06:00), Max: 36.9 (03-28-24 @ 15:15)  HR: 62 (03-29-24 @ 06:00) (62 - 69)  BP: 150/71 (03-29-24 @ 06:00) (150/71 - 174/85)  RR: 16 (03-29-24 @ 06:00) (16 - 18)  SpO2: 100% (03-29-24 @ 06:00) (94% - 100%)  Wt(kg): --        03-28-24 @ 07:01  -  03-29-24 @ 07:00  --------------------------------------------------------  IN: 400 mL / OUT: 3400 mL / NET: -3000 mL      Physical Exam:  	Gen NAD  	HEENT: no JVD  	Pulm: CTABL  	CV: S1S2,  	Abd: Soft,   	Ext:   - edema B/L LE   	Neuro: Awake and alert  	Skin: Warm and dry          Vascular:  IJ tunneled HD catheter, LABS/STUDIES  --------------------------------------------------------------------------------              9.7    5.74  >-----------<  125      [03-29-24 @ 05:05]              31.0     137  |  100  |  22  ----------------------------<  116      [03-29-24 @ 05:05]  4.3   |  24  |  1.95        Ca     8.9     [03-29-24 @ 05:05]      Mg     1.90     [03-29-24 @ 05:05]      Phos  3.9     [03-29-24 @ 05:05]    TPro  6.9  /  Alb  3.5  /  TBili  0.3  /  DBili  x   /  AST  17  /  ALT  16  /  AlkPhos  317  [03-28-24 @ 04:56]          Iron 86, TIBC 269, %sat 32      [08-17-23 @ 04:54]  Ferritin 984      [08-17-23 @ 04:54]  TSH 4.66      [03-27-24 @ 06:08]    HBsAg Nonreact      [03-28-24 @ 06:45]  
[de-identified] : Twin delivery and heart deceleration 
[de-identified] : NO NICU

## 2025-02-11 ENCOUNTER — OUTPATIENT (OUTPATIENT)
Dept: OUTPATIENT SERVICES | Facility: HOSPITAL | Age: 60
LOS: 1 days | End: 2025-02-11
Payer: COMMERCIAL

## 2025-02-11 ENCOUNTER — APPOINTMENT (OUTPATIENT)
Dept: CT IMAGING | Facility: IMAGING CENTER | Age: 60
End: 2025-02-11
Payer: COMMERCIAL

## 2025-02-11 DIAGNOSIS — Z98.49 CATARACT EXTRACTION STATUS, UNSPECIFIED EYE: Chronic | ICD-10-CM

## 2025-02-11 DIAGNOSIS — I77.0 ARTERIOVENOUS FISTULA, ACQUIRED: Chronic | ICD-10-CM

## 2025-02-11 DIAGNOSIS — Z01.818 ENCOUNTER FOR OTHER PREPROCEDURAL EXAMINATION: ICD-10-CM

## 2025-02-11 DIAGNOSIS — Z98.891 HISTORY OF UTERINE SCAR FROM PREVIOUS SURGERY: Chronic | ICD-10-CM

## 2025-02-11 PROCEDURE — 74176 CT ABD & PELVIS W/O CONTRAST: CPT

## 2025-02-11 PROCEDURE — 74176 CT ABD & PELVIS W/O CONTRAST: CPT | Mod: 26

## 2025-02-13 ENCOUNTER — NON-APPOINTMENT (OUTPATIENT)
Age: 60
End: 2025-02-13

## 2025-02-13 ENCOUNTER — TRANSCRIPTION ENCOUNTER (OUTPATIENT)
Age: 60
End: 2025-02-13

## 2025-02-13 ENCOUNTER — APPOINTMENT (OUTPATIENT)
Dept: VASCULAR SURGERY | Facility: HOSPITAL | Age: 60
End: 2025-02-13

## 2025-02-13 ENCOUNTER — OUTPATIENT (OUTPATIENT)
Dept: OUTPATIENT SERVICES | Facility: HOSPITAL | Age: 60
LOS: 1 days | End: 2025-02-13
Payer: COMMERCIAL

## 2025-02-13 VITALS
SYSTOLIC BLOOD PRESSURE: 227 MMHG | RESPIRATION RATE: 15 BRPM | OXYGEN SATURATION: 99 % | DIASTOLIC BLOOD PRESSURE: 97 MMHG | HEIGHT: 62 IN | HEART RATE: 70 BPM | TEMPERATURE: 98 F | WEIGHT: 130.07 LBS

## 2025-02-13 VITALS
DIASTOLIC BLOOD PRESSURE: 70 MMHG | SYSTOLIC BLOOD PRESSURE: 133 MMHG | HEART RATE: 63 BPM | RESPIRATION RATE: 20 BRPM | OXYGEN SATURATION: 98 %

## 2025-02-13 DIAGNOSIS — Z98.49 CATARACT EXTRACTION STATUS, UNSPECIFIED EYE: Chronic | ICD-10-CM

## 2025-02-13 DIAGNOSIS — Z98.891 HISTORY OF UTERINE SCAR FROM PREVIOUS SURGERY: Chronic | ICD-10-CM

## 2025-02-13 DIAGNOSIS — N18.4 CHRONIC KIDNEY DISEASE, STAGE 4 (SEVERE): ICD-10-CM

## 2025-02-13 DIAGNOSIS — I77.0 ARTERIOVENOUS FISTULA, ACQUIRED: Chronic | ICD-10-CM

## 2025-02-13 LAB
ANION GAP SERPL CALC-SCNC: 15 MMOL/L — SIGNIFICANT CHANGE UP (ref 5–17)
BUN SERPL-MCNC: 34 MG/DL — HIGH (ref 7–23)
CALCIUM SERPL-MCNC: 10 MG/DL — SIGNIFICANT CHANGE UP (ref 8.4–10.5)
CHLORIDE SERPL-SCNC: 96 MMOL/L — SIGNIFICANT CHANGE UP (ref 96–108)
CO2 SERPL-SCNC: 26 MMOL/L — SIGNIFICANT CHANGE UP (ref 22–31)
CREAT SERPL-MCNC: 2.91 MG/DL — HIGH (ref 0.5–1.3)
EGFR: 18 ML/MIN/1.73M2 — LOW
GLUCOSE BLDC GLUCOMTR-MCNC: 134 MG/DL — HIGH (ref 70–99)
GLUCOSE BLDC GLUCOMTR-MCNC: 197 MG/DL — HIGH (ref 70–99)
GLUCOSE SERPL-MCNC: 197 MG/DL — HIGH (ref 70–99)
HCT VFR BLD CALC: 36.6 % — SIGNIFICANT CHANGE UP (ref 34.5–45)
HGB BLD-MCNC: 11.3 G/DL — LOW (ref 11.5–15.5)
MCHC RBC-ENTMCNC: 23.6 PG — LOW (ref 27–34)
MCHC RBC-ENTMCNC: 30.9 G/DL — LOW (ref 32–36)
MCV RBC AUTO: 76.6 FL — LOW (ref 80–100)
NRBC BLD AUTO-RTO: 0 /100 WBCS — SIGNIFICANT CHANGE UP (ref 0–0)
PLATELET # BLD AUTO: 133 K/UL — LOW (ref 150–400)
POTASSIUM SERPL-MCNC: 3.9 MMOL/L — SIGNIFICANT CHANGE UP (ref 3.5–5.3)
POTASSIUM SERPL-SCNC: 3.9 MMOL/L — SIGNIFICANT CHANGE UP (ref 3.5–5.3)
RBC # BLD: 4.78 M/UL — SIGNIFICANT CHANGE UP (ref 3.8–5.2)
RBC # FLD: 16 % — HIGH (ref 10.3–14.5)
SODIUM SERPL-SCNC: 137 MMOL/L — SIGNIFICANT CHANGE UP (ref 135–145)
WBC # BLD: 7.65 K/UL — SIGNIFICANT CHANGE UP (ref 3.8–10.5)
WBC # FLD AUTO: 7.65 K/UL — SIGNIFICANT CHANGE UP (ref 3.8–10.5)

## 2025-02-13 PROCEDURE — 36901 INTRO CATH DIALYSIS CIRCUIT: CPT | Mod: LT

## 2025-02-13 PROCEDURE — 99152 MOD SED SAME PHYS/QHP 5/>YRS: CPT

## 2025-02-13 PROCEDURE — 36901 INTRO CATH DIALYSIS CIRCUIT: CPT

## 2025-02-13 PROCEDURE — 93005 ELECTROCARDIOGRAM TRACING: CPT

## 2025-02-13 PROCEDURE — 82962 GLUCOSE BLOOD TEST: CPT

## 2025-02-13 PROCEDURE — 85027 COMPLETE CBC AUTOMATED: CPT

## 2025-02-13 PROCEDURE — 80048 BASIC METABOLIC PNL TOTAL CA: CPT

## 2025-02-13 PROCEDURE — C1769: CPT

## 2025-02-13 PROCEDURE — C1894: CPT

## 2025-02-13 PROCEDURE — 76937 US GUIDE VASCULAR ACCESS: CPT | Mod: 26

## 2025-02-13 PROCEDURE — 93010 ELECTROCARDIOGRAM REPORT: CPT

## 2025-02-13 RX ORDER — CALCIUM ACETATE 667 MG/1
1 CAPSULE ORAL
Refills: 0 | DISCHARGE

## 2025-02-13 NOTE — ASU DISCHARGE PLAN (ADULT/PEDIATRIC) - CARE PROVIDER_API CALL
Xenia Treviño  Vascular Surgery  [address], [address]  [place] [address]  Phone: [phone]  Fax: [phone]  Follow Up Time: 1 week

## 2025-02-13 NOTE — H&P CARDIOLOGY - HISTORY OF PRESENT ILLNESS
59 year old [place] woman with significant PMHx of HTN, T2DM (diagnosed 25 years ago, previously on insulin, retinopathy and neuropathy), pericardial effusion 2023 s/p pericardiocentesis and end stage renal disease on hemodialysis ([facility], has a permacath, left radial AVF). Surgical history includes cataract surgery, arteriovenous fistula creation. Now presents for a fistulogram.      Echo: 3/28/2024. TTE. 1. Left ventricular cavity is normal in size. Left ventricular systolic function is normal with an ejection fraction of 69 % by Macedo's method of disks. There are no regional wall motion abnormalities seen. 2. There is moderate (grade 2) left ventricular diastolic dysfunction, with elevated filling pressure. 3. Mild left ventricular hypertrophy. 4. There is increased LV mass and concentric hypertrophy. 5. Normal right ventricular cavity size and normal systolic function. 6. The left atrium is moderately dilated. 7. No significant valvular disease.   8. Estimated pulmonary artery systolic pressure is 47 mmHg, consistent with moderate pulmonary hypertension. 9. The inferior vena cava is normal in size measuring 1.50 cm in diameter, (normal <2.1cm) with normal inspiratory collapse (normal >50%) consistent with normal right atrial pressure (, range 0-5mmHg).10. No pericardial effusion seen.  Nephrologist Dr. Coyle  59 year old [place] woman with significant PMHx of HTN, T2DM (diagnosed 25 years ago, previously on insulin, retinopathy and neuropathy), pericardial effusion 2023 s/p pericardiocentesis and end stage renal disease on hemodialysis ([facility], left radial AVF, last dialysis 2/12). Surgical history includes cataract surgery, arteriovenous fistula creation. Now presents for a fistulogram.      Echo: 3/28/2024. TTE. 1. Left ventricular cavity is normal in size. Left ventricular systolic function is normal with an ejection fraction of 69 % by Macedo's method of disks. There are no regional wall motion abnormalities seen. 2. There is moderate (grade 2) left ventricular diastolic dysfunction, with elevated filling pressure. 3. Mild left ventricular hypertrophy. 4. There is increased LV mass and concentric hypertrophy. 5. Normal right ventricular cavity size and normal systolic function. 6. The left atrium is moderately dilated. 7. No significant valvular disease.   8. Estimated pulmonary artery systolic pressure is 47 mmHg, consistent with moderate pulmonary hypertension. 9. The inferior vena cava is normal in size measuring 1.50 cm in diameter, (normal <2.1cm) with normal inspiratory collapse (normal >50%) consistent with normal right atrial pressure (, range 0-5mmHg).10. No pericardial effusion seen.

## 2025-02-13 NOTE — ASU PATIENT PROFILE, ADULT - FALL HARM RISK - FALL HARM RISK
[address]  [facility] EMERGENCY DEPT  [address]  [place] [id number] [phone]    Work/School Note    Date: 3/1/2022    To Whom It May concern:    Christoph Hence was seen and treated today in the emergency room by the following provider(s):  No providers found. Dr Lenward Curling may return to work on 3/4/2022.     Sincerely,          Tracey Nina RN No indicators present

## 2025-02-13 NOTE — ASU PATIENT PROFILE, ADULT - NS PRO ABUSE SCREEN SUSPICION NEGLECT YN
Assessment/Plan:    No problem-specific Assessment & Plan notes found for this encounter  Diagnoses and all orders for this visit:    Muscle spasm  -     cyclobenzaprine (FLEXERIL) 10 mg tablet; Take 1 tablet (10 mg total) by mouth 3 (three) times a day  -     Ambulatory referral to Physical Therapy; Future    Pain in both feet    Varicose veins of both lower extremities with pain  -     Ambulatory referral to Vascular Surgery; Future          Subjective:      Patient ID: Darrell Guy is a 47 y o  female  Back Pain   This is a recurrent problem  The current episode started in the past 7 days  The problem occurs constantly  The problem is unchanged  The pain is present in the thoracic spine  The quality of the pain is described as aching  The pain does not radiate  The pain is at a severity of 6/10  The pain is the same all the time  The symptoms are aggravated by bending and twisting  Pertinent negatives include no abdominal pain, bladder incontinence, bowel incontinence, chest pain, dysuria, fever, headaches, leg pain, numbness, paresis, paresthesias, pelvic pain, perianal numbness, tingling, weakness or weight loss  She has tried nothing for the symptoms  The following portions of the patient's history were reviewed and updated as appropriate: She  has a past medical history of Hyperlipidemia, Hypertension, JOSUÉ (obstructive sleep apnea) (7/16/2020), and Sinus pressure    She   Patient Active Problem List    Diagnosis Date Noted    JOSUÉ (obstructive sleep apnea) 07/16/2020    Foot pain 02/13/2020    Snoring 01/14/2020    Pain in both feet 01/14/2020    Obesity (BMI 35 0-39 9 without comorbidity) 11/17/2019    H  pylori infection 06/17/2019    Low ferritin 01/08/2019    Slow transit constipation 01/08/2019    Fatigue 10/08/2018    History of colonoscopy 10/08/2018    Overactive bladder 10/04/2018    Anxiety 08/30/2017    Chronic neck pain 08/30/2017    Chronic upper back pain 2017    Early satiety 2016    First degree hemorrhoids 2016    Constipation 10/14/2016    Epigastric pain 10/14/2016    Nocturnal leg cramps 2016    Heart palpitations 2016    Mild vitamin D deficiency 2016    Ovarian cyst, complex 2015    Headache 2015    Hyperlipidemia 2015    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    Osteoarthritis 2014    Essential hypertension 10/12/2012    Herpes simplex infection 2012     She  has a past surgical history that includes  section, low transverse; Laparoscopic total hysterectomy (2017); Larynx surgery; pr esophagogastroduodenoscopy transoral diagnostic (N/A, 3/26/2019); pr colonoscopy flx dx w/collj spec when pfrmd (N/A, 3/26/2019); and Tubal ligation  Her family history includes Hypertension in her mother  She  reports that she has never smoked  She has never used smokeless tobacco  She reports that she does not drink alcohol or use drugs  Current Outpatient Medications   Medication Sig Dispense Refill    meloxicam (MOBIC) 7 5 mg tablet Take 1 tablet (7 5 mg total) by mouth daily 30 tablet 5    phentermine (ADIPEX-P) 37 5 MG tablet Take 1/2 tab daily before breakfast for 2 weeks then if tolerated take 1/2 tab bid 30 tablet 2    Probiotic Product (PROBIOTIC DAILY PO) Take by mouth daily      RA VITAMIN D-3 125 MCG (5000 UT) capsule Take 1 capsule (5,000 Units total) by mouth daily 90 capsule 1    citalopram (CeleXA) 20 mg tablet Take by mouth      cyclobenzaprine (FLEXERIL) 10 mg tablet Take 1 tablet (10 mg total) by mouth 3 (three) times a day 30 tablet 0     No current facility-administered medications for this visit        Current Outpatient Medications on File Prior to Visit   Medication Sig    meloxicam (MOBIC) 7 5 mg tablet Take 1 tablet (7 5 mg total) by mouth daily    phentermine (ADIPEX-P) 37 5 MG tablet Take 1/2 tab daily before breakfast for 2 weeks then if tolerated take 1/2 tab bid    Probiotic Product (PROBIOTIC DAILY PO) Take by mouth daily    RA VITAMIN D-3 125 MCG (5000 UT) capsule Take 1 capsule (5,000 Units total) by mouth daily    citalopram (CeleXA) 20 mg tablet Take by mouth     No current facility-administered medications on file prior to visit       Review of Systems   Constitutional: Negative for fever and weight loss  Cardiovascular: Negative for chest pain  Gastrointestinal: Negative for abdominal pain and bowel incontinence  Genitourinary: Negative for bladder incontinence, dysuria and pelvic pain  Musculoskeletal: Positive for back pain  Neurological: Negative for tingling, weakness, numbness, headaches and paresthesias  All other systems reviewed and are negative  Objective:      /80 (BP Location: Left arm, Patient Position: Sitting, Cuff Size: Standard)   Pulse 73   Temp 98 3 °F (36 8 °C) (Temporal)   Resp 15   Wt 82 1 kg (181 lb)   LMP 07/02/2016   SpO2 98%   BMI 33 11 kg/m²          Physical Exam   Constitutional: She is oriented to person, place, and time  She appears well-developed  HENT:   Head: Normocephalic  Right Ear: External ear normal    Left Ear: External ear normal    Nose: Nose normal    Mouth/Throat: Oropharynx is clear and moist    Eyes: Pupils are equal, round, and reactive to light  Conjunctivae and EOM are normal    Neck: Normal range of motion  Neck supple  No thyromegaly present  Cardiovascular: Normal rate, regular rhythm and normal heart sounds  Pulmonary/Chest: Effort normal and breath sounds normal    Abdominal: Soft  There is no tenderness  There is no rebound and no guarding  Musculoskeletal: Normal range of motion  She exhibits tenderness  Thoracic back: She exhibits tenderness and spasm  Neurological: She is alert and oriented to person, place, and time  She has normal reflexes  Skin: Skin is dry  Psychiatric: She has a normal mood and affect     Nursing note and vitals reviewed  no

## 2025-02-13 NOTE — ASU DISCHARGE PLAN (ADULT/PEDIATRIC) - FINANCIAL ASSISTANCE
[facility] provides services at a reduced cost to those who are determined to be eligible through [facility]’s financial assistance program. Information regarding [facility]’s financial assistance program can be found by going to [email or web address] or by calling [phone].

## 2025-02-19 ENCOUNTER — APPOINTMENT (OUTPATIENT)
Dept: VASCULAR SURGERY | Facility: CLINIC | Age: 60
End: 2025-02-19
Payer: COMMERCIAL

## 2025-02-19 VITALS
HEIGHT: 62 IN | TEMPERATURE: 97.8 F | WEIGHT: 130 LBS | SYSTOLIC BLOOD PRESSURE: 214 MMHG | HEART RATE: 71 BPM | BODY MASS INDEX: 23.92 KG/M2 | DIASTOLIC BLOOD PRESSURE: 90 MMHG

## 2025-02-19 DIAGNOSIS — N18.6 END STAGE RENAL DISEASE: ICD-10-CM

## 2025-02-19 DIAGNOSIS — Z99.2 END STAGE RENAL DISEASE: ICD-10-CM

## 2025-02-19 PROCEDURE — 93985 DUP-SCAN HEMO COMPL BI STD: CPT

## 2025-02-19 PROCEDURE — 99214 OFFICE O/P EST MOD 30 MIN: CPT

## 2025-03-11 ENCOUNTER — OUTPATIENT (OUTPATIENT)
Dept: OUTPATIENT SERVICES | Facility: HOSPITAL | Age: 60
LOS: 1 days | End: 2025-03-11

## 2025-03-11 VITALS
RESPIRATION RATE: 16 BRPM | SYSTOLIC BLOOD PRESSURE: 116 MMHG | TEMPERATURE: 97 F | HEART RATE: 72 BPM | HEIGHT: 62 IN | DIASTOLIC BLOOD PRESSURE: 68 MMHG | WEIGHT: 130.07 LBS | OXYGEN SATURATION: 97 %

## 2025-03-11 DIAGNOSIS — I77.0 ARTERIOVENOUS FISTULA, ACQUIRED: Chronic | ICD-10-CM

## 2025-03-11 DIAGNOSIS — I10 ESSENTIAL (PRIMARY) HYPERTENSION: ICD-10-CM

## 2025-03-11 DIAGNOSIS — Z98.890 OTHER SPECIFIED POSTPROCEDURAL STATES: Chronic | ICD-10-CM

## 2025-03-11 DIAGNOSIS — Z86.79 PERSONAL HISTORY OF OTHER DISEASES OF THE CIRCULATORY SYSTEM: ICD-10-CM

## 2025-03-11 DIAGNOSIS — N18.4 CHRONIC KIDNEY DISEASE, STAGE 4 (SEVERE): ICD-10-CM

## 2025-03-11 DIAGNOSIS — E03.9 HYPOTHYROIDISM, UNSPECIFIED: ICD-10-CM

## 2025-03-11 DIAGNOSIS — E11.9 TYPE 2 DIABETES MELLITUS WITHOUT COMPLICATIONS: ICD-10-CM

## 2025-03-11 DIAGNOSIS — Z98.891 HISTORY OF UTERINE SCAR FROM PREVIOUS SURGERY: Chronic | ICD-10-CM

## 2025-03-11 DIAGNOSIS — Z98.49 CATARACT EXTRACTION STATUS, UNSPECIFIED EYE: Chronic | ICD-10-CM

## 2025-03-11 NOTE — H&P PST ADULT - NSICDXPASTMEDICALHX_GEN_ALL_CORE_FT
PAST MEDICAL HISTORY:  (HFpEF) heart failure with preserved ejection fraction     Anemia of chronic disease     DM (diabetes mellitus)     ESRD (end stage renal disease) on dialysis     HLD (hyperlipidemia)     HTN (hypertension)     Hypothyroidism     Moderate tricuspid regurgitation

## 2025-03-11 NOTE — H&P PST ADULT - HISTORY OF PRESENT ILLNESS
59 year old female with hx of HTN, HLD, HFpEF EF 69% (echo 3/2024 (no recent exacerbations) , moderate tricuspid regurgitation, moderate pulmonary HTN, Type 2 DM , hospitalized for myxedema coma 12/16/22 to 1/4/23 requiring intubation at [facility] - patient did not know she was hypothyroid at the time, h/o uremic pericardial tamponade (9/2023) s/p pericardiocentesis, CKD (stage 4 -HD - MWF), presents to PST with pre op dx of chronic kidney disease stage 4. Pt with L radiocephalic AVF with persistent hyperkalemia, requiring an extra HD session on Saturdays at times. Pt is scheduled for right AV fistula creation.

## 2025-03-11 NOTE — H&P PST ADULT - NSICDXPASTSURGICALHX_GEN_ALL_CORE_FT
PAST SURGICAL HISTORY:  AVF (arteriovenous fistula)     S/P      S/P cataract surgery     S/P pericardiocentesis

## 2025-03-11 NOTE — H&P PST ADULT - CLICK TO LAUNCH ORM
. Odomzo Counseling- I discussed with the patient the risks of Odomzo including but not limited to nausea, vomiting, diarrhea, constipation, weight loss, changes in the sense of taste, decreased appetite, muscle spasms, and hair loss.  The patient verbalized understanding of the proper use and possible adverse effects of Odomzo.  All of the patient's questions and concerns were addressed.

## 2025-03-11 NOTE — H&P PST ADULT - LAST STRESS TEST
3/2024 (HIE) Normal myocardial perfusion scan, with no evidence of infarction or inducible ischemia ---cardiac testing done as part of kidney transplant work up

## 2025-03-11 NOTE — H&P PST ADULT - PROBLEM SELECTOR PLAN 1
Patient tentatively scheduled for scheduled for right AV fistula creation for 3/18/25. Pre-op instructions provided. Pt given verbal and written instructions with teach back on chlorhexidine shampoo. Pt verbalized understanding with return demonstration.      Labs done in [facility] on 2/13/25.   K+ ordered on DOS.

## 2025-03-11 NOTE — H&P PST ADULT - LAST ECHOCARDIOGRAM
COPD exacerbation 3/2024 (HIE) Left ventricular systolic function is normal with an ejection 69%, Mild left ventricular hypertrophy.  moderate tricuspid regurgitation. Moderate pulmonary hypertension. There is increased LV mass and concentric hypertrophy.  The left atrium is moderately dilated.

## 2025-03-11 NOTE — H&P PST ADULT - NSICDXFAMILYHX_GEN_ALL_CORE_FT
FAMILY HISTORY:  Father  Still living? Unknown  CVA (cerebral vascular accident), Age at diagnosis: Age Unknown  Diabetes mellitus, Age at diagnosis: Age Unknown  ESRD (end stage renal disease), Age at diagnosis: Age Unknown  FH: stroke, Age at diagnosis: Age Unknown  HTN (hypertension), Age at diagnosis: Age Unknown

## 2025-03-11 NOTE — H&P PST ADULT - CARDIOVASCULAR COMMENTS
hx of CHF - on torsemide daily, no exacerbations, hx uremic pericardial tamponade (9/2023) s/p pericardiocentesis , echo 3/2024 (HIE) No pericardial effusion seen.

## 2025-03-11 NOTE — H&P PST ADULT - VASCULAR DETAILS
clean/healed
left arm AV fistula positive thrill and bruit
[facility] for [facility] month/Assisted living facility

## 2025-03-12 PROBLEM — I07.1 RHEUMATIC TRICUSPID INSUFFICIENCY: Chronic | Status: ACTIVE | Noted: 2025-03-11

## 2025-03-17 NOTE — ASU PATIENT PROFILE, ADULT - MEDICATIONS BROUGHT TO HOSPITAL, PROFILE
MRN:[id number]                      After Visit Summary   6/8/2017    Codi Headley    MRN: [id number]           Visit Information        Provider Department      6/8/2017 8:30 AM Jayesh Cooper, CELESTE [facility] DISCHARGE      Your next 10 appointments already scheduled     Jul 06, 2017  9:30 AM CDT   Return Visit with CELESTE Vega   [facility] ([facility])    [address] [place] [address] [id number] [phone]              MyChart Information     [facility] gives you secure access to your electronic health record. If you see a primary care provider, you can also send messages to your care team and make appointments. If you have questions, please call your primary care clinic.  If you do not have a primary care provider, please call [phone] and they will assist you.        Care EveryWhere ID     This is your Care EveryWhere ID. This could be used by other organizations to access your [facility] medical records  [id number]         no

## 2025-03-17 NOTE — ASU PATIENT PROFILE, ADULT - FALL HARM RISK - UNIVERSAL INTERVENTIONS
Bed in lowest position, wheels locked, appropriate side rails in place/Call bell, personal items and telephone in reach/Instruct patient to call for assistance before getting out of bed or chair/Non-slip footwear when patient is out of bed/[employer] to call system/Physically safe environment - no spills, clutter or unnecessary equipment/Purposeful Proactive Rounding/Room/bathroom lighting operational, light cord in reach

## 2025-03-18 ENCOUNTER — OUTPATIENT (OUTPATIENT)
Dept: OUTPATIENT SERVICES | Facility: HOSPITAL | Age: 60
LOS: 1 days | Discharge: HOME | End: 2025-03-18
Payer: MEDICARE

## 2025-03-18 ENCOUNTER — TRANSCRIPTION ENCOUNTER (OUTPATIENT)
Age: 60
End: 2025-03-18

## 2025-03-18 ENCOUNTER — APPOINTMENT (OUTPATIENT)
Dept: VASCULAR SURGERY | Facility: AMBULATORY SURGERY CENTER | Age: 60
End: 2025-03-18

## 2025-03-18 VITALS
HEIGHT: 62 IN | TEMPERATURE: 99 F | HEART RATE: 71 BPM | WEIGHT: 130.07 LBS | RESPIRATION RATE: 18 BRPM | SYSTOLIC BLOOD PRESSURE: 190 MMHG | OXYGEN SATURATION: 99 % | DIASTOLIC BLOOD PRESSURE: 70 MMHG

## 2025-03-18 VITALS
SYSTOLIC BLOOD PRESSURE: 154 MMHG | HEART RATE: 61 BPM | DIASTOLIC BLOOD PRESSURE: 64 MMHG | RESPIRATION RATE: 15 BRPM | OXYGEN SATURATION: 96 %

## 2025-03-18 DIAGNOSIS — Z98.890 OTHER SPECIFIED POSTPROCEDURAL STATES: Chronic | ICD-10-CM

## 2025-03-18 DIAGNOSIS — I77.0 ARTERIOVENOUS FISTULA, ACQUIRED: Chronic | ICD-10-CM

## 2025-03-18 DIAGNOSIS — N18.4 CHRONIC KIDNEY DISEASE, STAGE 4 (SEVERE): ICD-10-CM

## 2025-03-18 DIAGNOSIS — Z98.891 HISTORY OF UTERINE SCAR FROM PREVIOUS SURGERY: Chronic | ICD-10-CM

## 2025-03-18 DIAGNOSIS — Z98.49 CATARACT EXTRACTION STATUS, UNSPECIFIED EYE: Chronic | ICD-10-CM

## 2025-03-18 LAB
GLUCOSE BLDC GLUCOMTR-MCNC: 204 MG/DL — HIGH (ref 70–99)
POTASSIUM BLDV-SCNC: 4.4 MMOL/L — SIGNIFICANT CHANGE UP (ref 3.5–5.1)

## 2025-03-18 PROCEDURE — 36821 AV FUSION DIRECT ANY SITE: CPT | Mod: RT

## 2025-03-18 DEVICE — SURGICEL FIBRILLAR 2 X 4": Type: IMPLANTABLE DEVICE | Site: RIGHT | Status: FUNCTIONAL

## 2025-03-18 DEVICE — LIGATING CLIPS WECK HORIZON SMALL-WIDE (RED) 24: Type: IMPLANTABLE DEVICE | Site: RIGHT | Status: FUNCTIONAL

## 2025-03-18 NOTE — ASU DISCHARGE PLAN (ADULT/PEDIATRIC) - CARE PROVIDER_API CALL
Xenia Treviño  Vascular Surgery  [address], [address]  [place] [address]  Phone: [phone]  Fax: [phone]  Established Patient  Follow Up Time: 2 weeks

## 2025-03-18 NOTE — BRIEF OPERATIVE NOTE - OPERATION/FINDINGS
Creation of right radiocephalic arteriovenous fistula with placement of VenoStat sheath. Layered closure with vicryl and monocryl.

## 2025-03-18 NOTE — ASU DISCHARGE PLAN (ADULT/PEDIATRIC) - NS MD DC FALL RISK RISK
For information on Fall & Injury Prevention, visit: [email or web address] OR  [email or web address] OR  https://www.cdc.gov/steadi/patient.html

## 2025-03-18 NOTE — ASU DISCHARGE PLAN (ADULT/PEDIATRIC) - FINANCIAL ASSISTANCE
[facility] provides services at a reduced cost to those who are determined to be eligible through [facility]’s financial assistance program. Information regarding [facility]’s financial assistance program can be found by going to [email or web address] or by calling [phone].

## 2025-03-18 NOTE — ASU DISCHARGE PLAN (ADULT/PEDIATRIC) - NURSING INSTRUCTIONS
You have received Tyelneol/Acetaminophen during your hospital stay. The max amount of Tylenol daily is 4000MG. Please keep that in mind if you are taking other prescription or over the Counter pain medications or cold Medicine. The next time that you can take tylenol is at _5:15 PM_. You can then start taking it every 6 hours as  needed for pain.

## 2025-03-18 NOTE — ASU DISCHARGE PLAN (ADULT/PEDIATRIC) - FOLLOW UP APPOINTMENTS
Interval Hx:  Patient seen during rounds  Patient reports pain to be controlled on current medications  Patient denies sedation with medications     Analgesic Dosing for past 24 hours reviewed as below:  acetaminophen     Tablet ..   975 milliGRAM(s) Oral (22 @ 11:54)   975 milliGRAM(s) Oral (22 @ 05:33)   975 milliGRAM(s) Oral (22 @ 21:45)   975 milliGRAM(s) Oral (22 @ 17:46)    donepezil   5 milliGRAM(s) Oral (22 @ 21:44)    gabapentin   100 milliGRAM(s) Oral (22 @ 11:55)   100 milliGRAM(s) Oral (22 @ 05:33)   100 milliGRAM(s) Oral (22 @ 21:44)    ketorolac   Injectable   15 milliGRAM(s) IV Push (22 @ 15:37)    oxyCODONE    IR   5 milliGRAM(s) Oral (22 @ 08:38)   5 milliGRAM(s) Oral (22 @ 21:49)    sertraline   50 milliGRAM(s) Oral (22 @ 11:55)   50 milliGRAM(s) Oral (22 @ 15:37)          T(C): 36.4 (22 @ 07:30), Max: 36.5 (22 @ 16:01)  HR: 53 (22 @ 07:30) (52 - 67)  BP: 147/83 (22 @ 07:30) (114/68 - 163/84)  RR: 18 (22 @ 07:30) (12 - 18)  SpO2: 98% (22 @ 07:30) (94% - 100%)      22 @ 07:01  -  22 @ 07:00  --------------------------------------------------------  IN: 480 mL / OUT: 700 mL / NET: -220 mL        aMIOdarone    Tablet 200 milliGRAM(s) Oral daily  aspirin enteric coated 81 milliGRAM(s) Oral daily  BACItracin   Ointment 1 Application(s) Topical every 12 hours  BUpivacaine 0.25% Injectable 30 milliLiter(s) Local Injection once  donepezil 5 milliGRAM(s) Oral at bedtime  gabapentin 100 milliGRAM(s) Oral every 8 hours  heparin   Injectable 5000 Unit(s) SubCutaneous every 8 hours  ketorolac   Injectable 15 milliGRAM(s) IV Push every 6 hours PRN  lidocaine   4% Patch 1 Patch Transdermal daily  metoprolol tartrate 25 milliGRAM(s) Oral every 12 hours  oxyCODONE    IR 2.5 milliGRAM(s) Oral every 6 hours PRN  oxyCODONE    IR 5 milliGRAM(s) Oral every 6 hours PRN  polyethylene glycol 3350 17 Gram(s) Oral daily  senna 2 Tablet(s) Oral at bedtime  sertraline 50 milliGRAM(s) Oral daily  tamsulosin 0.4 milliGRAM(s) Oral at bedtime                          13.8   7.28  )-----------( 169      ( 2022 05:26 )             42.3     06-07    137  |  101  |  21.9<H>  ----------------------------<  95  4.2   |  25.0  |  0.83    Ca    8.4<L>      2022 05:26  Phos  2.8     06-07  Mg     2.3     06-07        Urinalysis Basic - ( 2022 07:06 )    Color: Yellow / Appearance: Clear / S.010 / pH: x  Gluc: x / Ketone: Negative  / Bili: Negative / Urobili: Negative mg/dL   Blood: x / Protein: 15 mg/dL / Nitrite: Negative   Leuk Esterase: Negative / RBC: Negative /HPF / WBC 0-2 /HPF   Sq Epi: x / Non Sq Epi: Occasional / Bacteria: Occasional        Pain Service   [phone] [facility] 1-2 drinks

## 2025-03-25 ENCOUNTER — NON-APPOINTMENT (OUTPATIENT)
Age: 60
End: 2025-03-25

## 2025-03-25 ENCOUNTER — APPOINTMENT (OUTPATIENT)
Dept: DERMATOLOGY | Facility: CLINIC | Age: 60
End: 2025-03-25
Payer: COMMERCIAL

## 2025-03-25 DIAGNOSIS — L81.0 POSTINFLAMMATORY HYPERPIGMENTATION: ICD-10-CM

## 2025-03-25 DIAGNOSIS — L30.0 NUMMULAR DERMATITIS: ICD-10-CM

## 2025-03-25 DIAGNOSIS — L85.3 XEROSIS CUTIS: ICD-10-CM

## 2025-03-25 PROCEDURE — 99203 OFFICE O/P NEW LOW 30 MIN: CPT

## 2025-03-25 RX ORDER — BETAMETHASONE DIPROPIONATE 0.5 MG/G
0.05 OINTMENT TOPICAL
Qty: 1 | Refills: 1 | Status: ACTIVE | COMMUNITY
Start: 2025-03-25 | End: 1900-01-01

## 2025-03-27 ENCOUNTER — NON-APPOINTMENT (OUTPATIENT)
Age: 60
End: 2025-03-27

## 2025-04-02 ENCOUNTER — APPOINTMENT (OUTPATIENT)
Dept: VASCULAR SURGERY | Facility: CLINIC | Age: 60
End: 2025-04-02
Payer: COMMERCIAL

## 2025-04-02 VITALS
HEIGHT: 62 IN | HEART RATE: 67 BPM | TEMPERATURE: 97.4 F | BODY MASS INDEX: 23.92 KG/M2 | DIASTOLIC BLOOD PRESSURE: 89 MMHG | WEIGHT: 130 LBS | SYSTOLIC BLOOD PRESSURE: 163 MMHG

## 2025-04-02 DIAGNOSIS — N18.6 END STAGE RENAL DISEASE: ICD-10-CM

## 2025-04-02 DIAGNOSIS — Z99.2 END STAGE RENAL DISEASE: ICD-10-CM

## 2025-04-02 PROCEDURE — 99024 POSTOP FOLLOW-UP VISIT: CPT

## 2025-04-03 ENCOUNTER — INPATIENT (INPATIENT)
Facility: HOSPITAL | Age: 60
LOS: 1 days | Discharge: HOME | End: 2025-04-05
Attending: INTERNAL MEDICINE | Admitting: INTERNAL MEDICINE
Payer: MEDICARE

## 2025-04-03 VITALS
OXYGEN SATURATION: 100 % | HEART RATE: 79 BPM | SYSTOLIC BLOOD PRESSURE: 221 MMHG | TEMPERATURE: 98 F | RESPIRATION RATE: 16 BRPM | HEIGHT: 62 IN | WEIGHT: 130.07 LBS | DIASTOLIC BLOOD PRESSURE: 85 MMHG

## 2025-04-03 DIAGNOSIS — Z98.891 HISTORY OF UTERINE SCAR FROM PREVIOUS SURGERY: Chronic | ICD-10-CM

## 2025-04-03 DIAGNOSIS — I77.0 ARTERIOVENOUS FISTULA, ACQUIRED: Chronic | ICD-10-CM

## 2025-04-03 DIAGNOSIS — Z98.890 OTHER SPECIFIED POSTPROCEDURAL STATES: Chronic | ICD-10-CM

## 2025-04-03 DIAGNOSIS — Z98.49 CATARACT EXTRACTION STATUS, UNSPECIFIED EYE: Chronic | ICD-10-CM

## 2025-04-03 PROCEDURE — 99285 EMERGENCY DEPT VISIT HI MDM: CPT

## 2025-04-03 PROCEDURE — 36410 VNPNXR 3YR/> PHY/QHP DX/THER: CPT

## 2025-04-04 DIAGNOSIS — R10.13 EPIGASTRIC PAIN: ICD-10-CM

## 2025-04-04 DIAGNOSIS — I50.32 CHRONIC DIASTOLIC (CONGESTIVE) HEART FAILURE: ICD-10-CM

## 2025-04-04 DIAGNOSIS — E83.39 OTHER DISORDERS OF PHOSPHORUS METABOLISM: ICD-10-CM

## 2025-04-04 DIAGNOSIS — E11.9 TYPE 2 DIABETES MELLITUS WITHOUT COMPLICATIONS: ICD-10-CM

## 2025-04-04 DIAGNOSIS — I10 ESSENTIAL (PRIMARY) HYPERTENSION: ICD-10-CM

## 2025-04-04 DIAGNOSIS — Z29.9 ENCOUNTER FOR PROPHYLACTIC MEASURES, UNSPECIFIED: ICD-10-CM

## 2025-04-04 DIAGNOSIS — R07.89 OTHER CHEST PAIN: ICD-10-CM

## 2025-04-04 DIAGNOSIS — N18.6 END STAGE RENAL DISEASE: ICD-10-CM

## 2025-04-04 DIAGNOSIS — D64.9 ANEMIA, UNSPECIFIED: ICD-10-CM

## 2025-04-04 LAB
A1C WITH ESTIMATED AVERAGE GLUCOSE RESULT: 8.1 % — HIGH (ref 4–5.6)
ALBUMIN SERPL ELPH-MCNC: 4.4 G/DL — SIGNIFICANT CHANGE UP (ref 3.3–5)
ALP SERPL-CCNC: 217 U/L — HIGH (ref 40–120)
ALT FLD-CCNC: 11 U/L — SIGNIFICANT CHANGE UP (ref 4–33)
ALT FLD-CCNC: 9 U/L — SIGNIFICANT CHANGE UP (ref 4–33)
ANION GAP SERPL CALC-SCNC: 17 MMOL/L — HIGH (ref 7–14)
ANION GAP SERPL CALC-SCNC: 22 MMOL/L — HIGH (ref 7–14)
APTT BLD: 40.1 SEC — HIGH (ref 24.5–35.6)
AST SERPL-CCNC: 18 U/L — SIGNIFICANT CHANGE UP (ref 4–32)
BASOPHILS # BLD AUTO: 0.04 K/UL — SIGNIFICANT CHANGE UP (ref 0–0.2)
BASOPHILS NFR BLD AUTO: 0.6 % — SIGNIFICANT CHANGE UP (ref 0–2)
BILIRUB SERPL-MCNC: 0.3 MG/DL — SIGNIFICANT CHANGE UP (ref 0.2–1.2)
BLOOD GAS VENOUS COMPREHENSIVE RESULT: SIGNIFICANT CHANGE UP
BUN SERPL-MCNC: 40 MG/DL — HIGH (ref 7–23)
BUN SERPL-MCNC: 47 MG/DL — HIGH (ref 7–23)
CALCIUM SERPL-MCNC: 9.2 MG/DL — SIGNIFICANT CHANGE UP (ref 8.4–10.5)
CALCIUM SERPL-MCNC: 9.3 MG/DL — SIGNIFICANT CHANGE UP (ref 8.4–10.5)
CHLORIDE SERPL-SCNC: 95 MMOL/L — LOW (ref 98–107)
CHLORIDE SERPL-SCNC: 96 MMOL/L — LOW (ref 98–107)
CO2 SERPL-SCNC: 20 MMOL/L — LOW (ref 22–31)
CO2 SERPL-SCNC: 22 MMOL/L — SIGNIFICANT CHANGE UP (ref 22–31)
CREAT SERPL-MCNC: 3.31 MG/DL — HIGH (ref 0.5–1.3)
CREAT SERPL-MCNC: 4.1 MG/DL — HIGH (ref 0.5–1.3)
DIALYSIS INSTRUMENT RESULT - HEPATITIS B SURFACE ANTIGEN: NEGATIVE — SIGNIFICANT CHANGE UP
EGFR: 12 ML/MIN/1.73M2 — LOW
EGFR: 12 ML/MIN/1.73M2 — LOW
EGFR: 16 ML/MIN/1.73M2 — LOW
EGFR: 16 ML/MIN/1.73M2 — LOW
EOSINOPHIL # BLD AUTO: 0.29 K/UL — SIGNIFICANT CHANGE UP (ref 0–0.5)
EOSINOPHIL NFR BLD AUTO: 4.3 % — SIGNIFICANT CHANGE UP (ref 0–6)
ESTIMATED AVERAGE GLUCOSE: 186 — SIGNIFICANT CHANGE UP
GLUCOSE BLDC GLUCOMTR-MCNC: 116 MG/DL — HIGH (ref 70–99)
GLUCOSE BLDC GLUCOMTR-MCNC: 144 MG/DL — HIGH (ref 70–99)
GLUCOSE BLDC GLUCOMTR-MCNC: 168 MG/DL — HIGH (ref 70–99)
GLUCOSE BLDC GLUCOMTR-MCNC: 173 MG/DL — HIGH (ref 70–99)
GLUCOSE BLDC GLUCOMTR-MCNC: 281 MG/DL — HIGH (ref 70–99)
GLUCOSE BLDC GLUCOMTR-MCNC: 329 MG/DL — HIGH (ref 70–99)
GLUCOSE SERPL-MCNC: 170 MG/DL — HIGH (ref 70–99)
GLUCOSE SERPL-MCNC: 193 MG/DL — HIGH (ref 70–99)
HCT VFR BLD CALC: 30.1 % — LOW (ref 34.5–45)
HGB BLD-MCNC: 9.4 G/DL — LOW (ref 11.5–15.5)
IANC: 4.15 K/UL — SIGNIFICANT CHANGE UP (ref 1.8–7.4)
IMM GRANULOCYTES NFR BLD AUTO: 0.4 % — SIGNIFICANT CHANGE UP (ref 0–0.9)
INR BLD: 1 RATIO — SIGNIFICANT CHANGE UP (ref 0.85–1.16)
LYMPHOCYTES # BLD AUTO: 1.67 K/UL — SIGNIFICANT CHANGE UP (ref 1–3.3)
LYMPHOCYTES # BLD AUTO: 24.8 % — SIGNIFICANT CHANGE UP (ref 13–44)
MAGNESIUM SERPL-MCNC: 2.3 MG/DL — SIGNIFICANT CHANGE UP (ref 1.6–2.6)
MCHC RBC-ENTMCNC: 24.4 PG — LOW (ref 27–34)
MCHC RBC-ENTMCNC: 31.2 G/DL — LOW (ref 32–36)
MCV RBC AUTO: 78 FL — LOW (ref 80–100)
MONOCYTES # BLD AUTO: 0.55 K/UL — SIGNIFICANT CHANGE UP (ref 0–0.9)
MONOCYTES NFR BLD AUTO: 8.2 % — SIGNIFICANT CHANGE UP (ref 2–14)
NEUTROPHILS # BLD AUTO: 4.15 K/UL — SIGNIFICANT CHANGE UP (ref 1.8–7.4)
NEUTROPHILS NFR BLD AUTO: 61.7 % — SIGNIFICANT CHANGE UP (ref 43–77)
NRBC # BLD AUTO: 0 K/UL — SIGNIFICANT CHANGE UP (ref 0–0)
NRBC # FLD: 0 K/UL — SIGNIFICANT CHANGE UP (ref 0–0)
NRBC BLD AUTO-RTO: 0 /100 WBCS — SIGNIFICANT CHANGE UP (ref 0–0)
NT-PROBNP SERPL-SCNC: 5932 PG/ML — HIGH
PHOSPHATE SERPL-MCNC: 3.7 MG/DL — SIGNIFICANT CHANGE UP (ref 2.5–4.5)
PLATELET # BLD AUTO: 159 K/UL — SIGNIFICANT CHANGE UP (ref 150–400)
POTASSIUM SERPL-MCNC: 3.8 MMOL/L — SIGNIFICANT CHANGE UP (ref 3.5–5.3)
POTASSIUM SERPL-MCNC: 4.6 MMOL/L — SIGNIFICANT CHANGE UP (ref 3.5–5.3)
POTASSIUM SERPL-SCNC: 3.8 MMOL/L — SIGNIFICANT CHANGE UP (ref 3.5–5.3)
POTASSIUM SERPL-SCNC: 4.6 MMOL/L — SIGNIFICANT CHANGE UP (ref 3.5–5.3)
PROT SERPL-MCNC: 7.9 G/DL — SIGNIFICANT CHANGE UP (ref 6–8.3)
PROTHROM AB SERPL-ACNC: 11.6 SEC — SIGNIFICANT CHANGE UP (ref 9.9–13.4)
RBC # BLD: 3.86 M/UL — SIGNIFICANT CHANGE UP (ref 3.8–5.2)
RBC # FLD: 18.6 % — HIGH (ref 10.3–14.5)
SODIUM SERPL-SCNC: 135 MMOL/L — SIGNIFICANT CHANGE UP (ref 135–145)
SODIUM SERPL-SCNC: 137 MMOL/L — SIGNIFICANT CHANGE UP (ref 135–145)
TROPONIN T, HIGH SENSITIVITY RESULT: 46 NG/L — SIGNIFICANT CHANGE UP
TROPONIN T, HIGH SENSITIVITY RESULT: 46 NG/L — SIGNIFICANT CHANGE UP
WBC # BLD: 6.73 K/UL — SIGNIFICANT CHANGE UP (ref 3.8–10.5)
WBC # FLD AUTO: 6.73 K/UL — SIGNIFICANT CHANGE UP (ref 3.8–10.5)

## 2025-04-04 PROCEDURE — 99222 1ST HOSP IP/OBS MODERATE 55: CPT | Mod: GC

## 2025-04-04 PROCEDURE — 74176 CT ABD & PELVIS W/O CONTRAST: CPT | Mod: 26

## 2025-04-04 PROCEDURE — 71045 X-RAY EXAM CHEST 1 VIEW: CPT | Mod: 26

## 2025-04-04 PROCEDURE — 99223 1ST HOSP IP/OBS HIGH 75: CPT

## 2025-04-04 RX ORDER — INSULIN LISPRO 100 U/ML
INJECTION, SOLUTION INTRAVENOUS; SUBCUTANEOUS AT BEDTIME
Refills: 0 | Status: DISCONTINUED | OUTPATIENT
Start: 2025-04-04 | End: 2025-04-05

## 2025-04-04 RX ORDER — TORSEMIDE 20 MG/1
60 TABLET ORAL DAILY
Refills: 0 | Status: DISCONTINUED | OUTPATIENT
Start: 2025-04-04 | End: 2025-04-05

## 2025-04-04 RX ORDER — ACETAMINOPHEN 500 MG/5ML
650 LIQUID (ML) ORAL EVERY 6 HOURS
Refills: 0 | Status: DISCONTINUED | OUTPATIENT
Start: 2025-04-04 | End: 2025-04-05

## 2025-04-04 RX ORDER — DEXTROSE 50 % IN WATER 50 %
15 SYRINGE (ML) INTRAVENOUS ONCE
Refills: 0 | Status: DISCONTINUED | OUTPATIENT
Start: 2025-04-04 | End: 2025-04-05

## 2025-04-04 RX ORDER — NIFEDIPINE 30 MG
1 TABLET, EXTENDED RELEASE 24 HR ORAL
Refills: 0 | DISCHARGE

## 2025-04-04 RX ORDER — HEPARIN SODIUM 1000 [USP'U]/ML
5000 INJECTION INTRAVENOUS; SUBCUTANEOUS EVERY 8 HOURS
Refills: 0 | Status: DISCONTINUED | OUTPATIENT
Start: 2025-04-04 | End: 2025-04-05

## 2025-04-04 RX ORDER — DEXTROSE 50 % IN WATER 50 %
25 SYRINGE (ML) INTRAVENOUS ONCE
Refills: 0 | Status: DISCONTINUED | OUTPATIENT
Start: 2025-04-04 | End: 2025-04-05

## 2025-04-04 RX ORDER — INSULIN LISPRO 100 U/ML
INJECTION, SOLUTION INTRAVENOUS; SUBCUTANEOUS
Refills: 0 | Status: DISCONTINUED | OUTPATIENT
Start: 2025-04-04 | End: 2025-04-05

## 2025-04-04 RX ORDER — MAGNESIUM, ALUMINUM HYDROXIDE 200-200 MG
30 TABLET,CHEWABLE ORAL EVERY 4 HOURS
Refills: 0 | Status: DISCONTINUED | OUTPATIENT
Start: 2025-04-04 | End: 2025-04-05

## 2025-04-04 RX ORDER — TORSEMIDE 10 MG
3 TABLET ORAL
Refills: 0 | DISCHARGE

## 2025-04-04 RX ORDER — SODIUM ZIRCONIUM CYCLOSILICATE 5 G/5G
1 POWDER, FOR SUSPENSION ORAL
Refills: 0 | DISCHARGE

## 2025-04-04 RX ORDER — MAGNESIUM, ALUMINUM HYDROXIDE 200-200 MG
30 TABLET,CHEWABLE ORAL ONCE
Refills: 0 | Status: COMPLETED | OUTPATIENT
Start: 2025-04-04 | End: 2025-04-04

## 2025-04-04 RX ORDER — SODIUM CHLORIDE 9 G/1000ML
1000 INJECTION, SOLUTION INTRAVENOUS
Refills: 0 | Status: DISCONTINUED | OUTPATIENT
Start: 2025-04-04 | End: 2025-04-05

## 2025-04-04 RX ORDER — DEXTROSE 50 % IN WATER 50 %
12.5 SYRINGE (ML) INTRAVENOUS ONCE
Refills: 0 | Status: DISCONTINUED | OUTPATIENT
Start: 2025-04-04 | End: 2025-04-05

## 2025-04-04 RX ORDER — GLUCAGON 3 MG/1
1 POWDER NASAL ONCE
Refills: 0 | Status: DISCONTINUED | OUTPATIENT
Start: 2025-04-04 | End: 2025-04-05

## 2025-04-04 RX ORDER — LEVOTHYROXINE SODIUM 300 MCG
125 TABLET ORAL DAILY
Refills: 0 | Status: DISCONTINUED | OUTPATIENT
Start: 2025-04-04 | End: 2025-04-05

## 2025-04-04 RX ORDER — NIFEDIPINE 30 MG
30 TABLET, EXTENDED RELEASE 24 HR ORAL DAILY
Refills: 0 | Status: DISCONTINUED | OUTPATIENT
Start: 2025-04-04 | End: 2025-04-05

## 2025-04-04 RX ADMIN — HEPARIN SODIUM 5000 UNIT(S): 1000 INJECTION INTRAVENOUS; SUBCUTANEOUS at 13:34

## 2025-04-04 RX ADMIN — Medication 125 MICROGRAM(S): at 06:40

## 2025-04-04 RX ADMIN — INSULIN LISPRO 4: 100 INJECTION, SOLUTION INTRAVENOUS; SUBCUTANEOUS at 11:56

## 2025-04-04 RX ADMIN — Medication 30 MILLILITER(S): at 03:57

## 2025-04-04 RX ADMIN — INSULIN LISPRO 1: 100 INJECTION, SOLUTION INTRAVENOUS; SUBCUTANEOUS at 22:34

## 2025-04-04 RX ADMIN — Medication 667 MILLIGRAM(S): at 11:56

## 2025-04-04 RX ADMIN — Medication 50 MILLIGRAM(S): at 09:17

## 2025-04-04 RX ADMIN — Medication 30 MILLIGRAM(S): at 08:01

## 2025-04-04 RX ADMIN — Medication 50 MILLIGRAM(S): at 19:52

## 2025-04-04 RX ADMIN — HEPARIN SODIUM 5000 UNIT(S): 1000 INJECTION INTRAVENOUS; SUBCUTANEOUS at 06:42

## 2025-04-04 RX ADMIN — HEPARIN SODIUM 5000 UNIT(S): 1000 INJECTION INTRAVENOUS; SUBCUTANEOUS at 22:35

## 2025-04-04 RX ADMIN — Medication 667 MILLIGRAM(S): at 19:43

## 2025-04-04 RX ADMIN — TORSEMIDE 60 MILLIGRAM(S): 20 TABLET ORAL at 08:01

## 2025-04-04 RX ADMIN — Medication 667 MILLIGRAM(S): at 09:17

## 2025-04-05 VITALS
DIASTOLIC BLOOD PRESSURE: 61 MMHG | SYSTOLIC BLOOD PRESSURE: 143 MMHG | HEART RATE: 68 BPM | OXYGEN SATURATION: 98 % | RESPIRATION RATE: 18 BRPM | TEMPERATURE: 98 F

## 2025-04-05 LAB
GLUCOSE BLDC GLUCOMTR-MCNC: 151 MG/DL — HIGH (ref 70–99)
GLUCOSE BLDC GLUCOMTR-MCNC: 246 MG/DL — HIGH (ref 70–99)
HAV IGM SER-ACNC: SIGNIFICANT CHANGE UP
HBV CORE AB SER-ACNC: SIGNIFICANT CHANGE UP
HBV CORE IGM SER-ACNC: SIGNIFICANT CHANGE UP
HBV SURFACE AB SER-ACNC: >1000 MIU/ML — SIGNIFICANT CHANGE UP
HBV SURFACE AG SER-ACNC: SIGNIFICANT CHANGE UP
HCV AB S/CO SERPL IA: 0.16 S/CO — SIGNIFICANT CHANGE UP (ref 0–0.79)
HCV AB SERPL-IMP: SIGNIFICANT CHANGE UP
MRSA PCR RESULT.: SIGNIFICANT CHANGE UP
MRSA PCR RESULT.: SIGNIFICANT CHANGE UP
S AUREUS DNA NOSE QL NAA+PROBE: DETECTED
S AUREUS DNA NOSE QL NAA+PROBE: DETECTED

## 2025-04-05 RX ORDER — MUPIROCIN CALCIUM 20 MG/G
1 CREAM TOPICAL
Refills: 0 | Status: DISCONTINUED | OUTPATIENT
Start: 2025-04-05 | End: 2025-04-05

## 2025-04-05 RX ADMIN — Medication 40 MILLIGRAM(S): at 05:40

## 2025-04-05 RX ADMIN — Medication 1 APPLICATION(S): at 12:18

## 2025-04-05 RX ADMIN — Medication 30 MILLIGRAM(S): at 05:39

## 2025-04-05 RX ADMIN — Medication 50 MILLIGRAM(S): at 05:39

## 2025-04-05 RX ADMIN — Medication 667 MILLIGRAM(S): at 12:17

## 2025-04-05 RX ADMIN — TORSEMIDE 60 MILLIGRAM(S): 20 TABLET ORAL at 05:39

## 2025-04-05 RX ADMIN — Medication 125 MICROGRAM(S): at 05:39

## 2025-04-05 RX ADMIN — INSULIN LISPRO 1: 100 INJECTION, SOLUTION INTRAVENOUS; SUBCUTANEOUS at 08:01

## 2025-04-05 RX ADMIN — HEPARIN SODIUM 5000 UNIT(S): 1000 INJECTION INTRAVENOUS; SUBCUTANEOUS at 05:39

## 2025-04-05 RX ADMIN — INSULIN LISPRO 2: 100 INJECTION, SOLUTION INTRAVENOUS; SUBCUTANEOUS at 12:18

## 2025-04-16 ENCOUNTER — APPOINTMENT (OUTPATIENT)
Dept: VASCULAR SURGERY | Facility: CLINIC | Age: 60
End: 2025-04-16
Payer: COMMERCIAL

## 2025-04-16 VITALS — TEMPERATURE: 97.3 F | BODY MASS INDEX: 23.92 KG/M2 | WEIGHT: 130 LBS | HEIGHT: 62 IN

## 2025-04-16 PROCEDURE — 99024 POSTOP FOLLOW-UP VISIT: CPT

## 2025-05-01 ENCOUNTER — APPOINTMENT (OUTPATIENT)
Dept: CARDIOLOGY | Facility: CLINIC | Age: 60
End: 2025-05-01

## 2025-05-08 ENCOUNTER — APPOINTMENT (OUTPATIENT)
Dept: OTOLARYNGOLOGY | Facility: CLINIC | Age: 60
End: 2025-05-08
Payer: MEDICARE

## 2025-05-08 VITALS
BODY MASS INDEX: 23.92 KG/M2 | DIASTOLIC BLOOD PRESSURE: 89 MMHG | SYSTOLIC BLOOD PRESSURE: 131 MMHG | WEIGHT: 130 LBS | OXYGEN SATURATION: 90 % | HEIGHT: 62 IN | HEART RATE: 63 BPM

## 2025-05-08 PROCEDURE — 99203 OFFICE O/P NEW LOW 30 MIN: CPT

## 2025-05-08 PROCEDURE — 92567 TYMPANOMETRY: CPT

## 2025-05-08 PROCEDURE — 92557 COMPREHENSIVE HEARING TEST: CPT

## 2025-05-09 NOTE — ED ADULT NURSE NOTE - HIV OFFER
Previously Negative (within the last year)

## 2025-05-13 ENCOUNTER — NON-APPOINTMENT (OUTPATIENT)
Age: 60
End: 2025-05-13

## 2025-05-13 ENCOUNTER — APPOINTMENT (OUTPATIENT)
Dept: NEPHROLOGY | Facility: CLINIC | Age: 60
End: 2025-05-13

## 2025-05-13 ENCOUNTER — APPOINTMENT (OUTPATIENT)
Dept: CARDIOLOGY | Facility: CLINIC | Age: 60
End: 2025-05-13
Payer: MEDICARE

## 2025-05-13 VITALS — SYSTOLIC BLOOD PRESSURE: 120 MMHG | DIASTOLIC BLOOD PRESSURE: 82 MMHG | HEART RATE: 68 BPM | OXYGEN SATURATION: 94 %

## 2025-05-13 DIAGNOSIS — E78.5 HYPERLIPIDEMIA, UNSPECIFIED: ICD-10-CM

## 2025-05-13 DIAGNOSIS — I27.20 PULMONARY HYPERTENSION, UNSPECIFIED: ICD-10-CM

## 2025-05-13 PROCEDURE — 93000 ELECTROCARDIOGRAM COMPLETE: CPT | Mod: NC

## 2025-05-13 PROCEDURE — 99204 OFFICE O/P NEW MOD 45 MIN: CPT

## 2025-05-13 PROCEDURE — G2211 COMPLEX E/M VISIT ADD ON: CPT

## 2025-05-13 RX ORDER — SODIUM ZIRCONIUM CYCLOSILICATE 10 G/10G
10 POWDER, FOR SUSPENSION ORAL
Refills: 0 | Status: ACTIVE | COMMUNITY
Start: 2025-05-13

## 2025-05-13 RX ORDER — TORSEMIDE 20 MG/1
20 TABLET ORAL
Refills: 0 | Status: ACTIVE | COMMUNITY
Start: 2025-05-13

## 2025-05-14 ENCOUNTER — APPOINTMENT (OUTPATIENT)
Dept: VASCULAR SURGERY | Facility: CLINIC | Age: 60
End: 2025-05-14

## 2025-05-15 ENCOUNTER — APPOINTMENT (OUTPATIENT)
Age: 60
End: 2025-05-15
Payer: MEDICARE

## 2025-05-15 PROCEDURE — 76937 US GUIDE VASCULAR ACCESS: CPT

## 2025-05-15 PROCEDURE — 36902Z: CUSTOM | Mod: 58

## 2025-05-15 PROCEDURE — 99152Z: CUSTOM

## 2025-05-27 ENCOUNTER — APPOINTMENT (OUTPATIENT)
Dept: TRANSPLANT | Facility: CLINIC | Age: 60
End: 2025-05-27
Payer: COMMERCIAL

## 2025-05-27 ENCOUNTER — LABORATORY RESULT (OUTPATIENT)
Age: 60
End: 2025-05-27

## 2025-05-27 VITALS
SYSTOLIC BLOOD PRESSURE: 160 MMHG | WEIGHT: 124 LBS | HEART RATE: 69 BPM | DIASTOLIC BLOOD PRESSURE: 57 MMHG | OXYGEN SATURATION: 94 % | TEMPERATURE: 98 F | BODY MASS INDEX: 22.82 KG/M2 | RESPIRATION RATE: 14 BRPM | HEIGHT: 62 IN

## 2025-05-27 DIAGNOSIS — Z01.818 ENCOUNTER FOR OTHER PREPROCEDURAL EXAMINATION: ICD-10-CM

## 2025-05-27 DIAGNOSIS — Z99.2 END STAGE RENAL DISEASE: ICD-10-CM

## 2025-05-27 DIAGNOSIS — N18.6 END STAGE RENAL DISEASE: ICD-10-CM

## 2025-05-27 PROCEDURE — 99215 OFFICE O/P EST HI 40 MIN: CPT

## 2025-05-29 ENCOUNTER — NON-APPOINTMENT (OUTPATIENT)
Age: 60
End: 2025-05-29

## 2025-05-29 LAB
ABORH: NORMAL
ALBUMIN SERPL ELPH-MCNC: 4.7 G/DL
ALP BLD-CCNC: 190 U/L
ALT SERPL-CCNC: 16 U/L
ANION GAP SERPL CALC-SCNC: 21 MMOL/L
APPEARANCE: CLEAR
AST SERPL-CCNC: 18 U/L
BILIRUB SERPL-MCNC: 0.3 MG/DL
BILIRUBIN URINE: NEGATIVE
BLOOD URINE: NEGATIVE
BUN SERPL-MCNC: 39 MG/DL
C PEPTIDE SERPL-MCNC: 16.1 NG/ML
CALCIUM SERPL-MCNC: 9.3 MG/DL
CHLORIDE SERPL-SCNC: 93 MMOL/L
CHOLEST SERPL-MCNC: 202 MG/DL
CK SERPL-CCNC: 47 U/L
CMV IGG SERPL QL: >10 U/ML
CMV IGG SERPL-IMP: POSITIVE
CO2 SERPL-SCNC: 20 MMOL/L
COLOR: YELLOW
COVID-19 SPIKE DOMAIN ANTIBODY INTERPRETATION: POSITIVE
CREAT SERPL-MCNC: 4.04 MG/DL
CREAT SPEC-SCNC: 142 MG/DL
CREAT/PROT UR: 5.7 RATIO
CRP SERPL-MCNC: 8 MG/L
EBV DNA SERPL NAA+PROBE-ACNC: NOT DETECTED IU/ML
EBV EA AB SER IA-ACNC: >150 U/ML
EBV EA AB TITR SER IF: POSITIVE
EBV EA IGG SER QL IA: >600 U/ML
EBV EA IGG SER-ACNC: POSITIVE
EBV EA IGM SER IA-ACNC: NEGATIVE
EBV PATRN SPEC IB-IMP: NORMAL
EBV VCA IGG SER IA-ACNC: 313 U/ML
EBV VCA IGM SER QL IA: <10 U/ML
EBVPCR LOG: NOT DETECTED LOG10IU/ML
EGFRCR SERPLBLD CKD-EPI 2021: 12 ML/MIN/1.73M2
EPSTEIN-BARR VIRUS CAPSID ANTIGEN IGG: POSITIVE
ERYTHROCYTE [SEDIMENTATION RATE] IN BLOOD BY WESTERGREN METHOD: 71 MM/HR
ESTIMATED AVERAGE GLUCOSE: 171 MG/DL
GLUCOSE QUALITATIVE U: NEGATIVE
GLUCOSE SERPL-MCNC: 284 MG/DL
HBA1C MFR BLD HPLC: 7.6 %
HBV CORE IGG+IGM SER QL: NONREACTIVE
HBV SURFACE AB SER QL: REACTIVE
HBV SURFACE AB SERPL IA-ACNC: >1000 MIU/ML
HBV SURFACE AG SER QL: NONREACTIVE
HCG SERPL-MCNC: 2 MIU/ML
HCT VFR BLD CALC: 41.2 %
HCV AB SER QL: NONREACTIVE
HCV S/CO RATIO: 0.15 S/CO
HDLC SERPL-MCNC: 31 MG/DL
HEPATITIS A IGG ANTIBODY: REACTIVE
HGB BLD-MCNC: 12.8 G/DL
HIV1+2 AB SPEC QL IA.RAPID: NONREACTIVE
HSV 1+2 IGG SER IA-IMP: NEGATIVE
HSV 1+2 IGG SER IA-IMP: POSITIVE
HSV 1+2 IGG SER IA-IMP: POSITIVE
HSV1 IGG SER QL: 46.6 INDEX
HSV1 IGG SER QL: 46.6 INDEX
HSV2 IGG SER QL: 0.11 INDEX
ISOAGGLUTININ TITER, ANTI-A, IGG: 32
ISOAGGLUTININ TITER, ANTI-A, IGM: 32
KETONES URINE: NEGATIVE
LDLC SERPL-MCNC: 111 MG/DL
LEUKOCYTE ESTERASE URINE: NEGATIVE
M TB IFN-G BLD-IMP: NEGATIVE
MAGNESIUM SERPL-MCNC: 2.3 MG/DL
MCHC RBC-ENTMCNC: 25.3 PG
MCHC RBC-ENTMCNC: 31.1 G/DL
MCV RBC AUTO: 81.4 FL
MEV IGG FLD QL IA: >300 AU/ML
MEV IGG FLD QL IA: >300 AU/ML
MEV IGG+IGM SER-IMP: POSITIVE
MEV IGG+IGM SER-IMP: POSITIVE
MUV AB SER-ACNC: POSITIVE
MUV IGG SER QL IA: >300 AU/ML
NITRITE URINE: NEGATIVE
NONHDLC SERPL-MCNC: 171 MG/DL
PARATHYROID HORMONE INTACT: 194 PG/ML
PH URINE: 6
PHOSPHATE SERPL-MCNC: 5.5 MG/DL
PLATELET # BLD AUTO: 133 K/UL
POTASSIUM SERPL-SCNC: 4.9 MMOL/L
PROT SERPL-MCNC: 7.8 G/DL
PROT UR-MCNC: 807 MG/DL
PROTEIN URINE: ABNORMAL
QUANTIFERON TB PLUS MITOGEN MINUS NIL: >10 IU/ML
QUANTIFERON TB PLUS NIL: 0.03 IU/ML
QUANTIFERON TB PLUS TB1 MINUS NIL: 0 IU/ML
QUANTIFERON TB PLUS TB2 MINUS NIL: 0 IU/ML
RBC # BLD: 5.06 M/UL
RBC # FLD: 17 %
RUBV IGG FLD-ACNC: 13.7 INDEX
RUBV IGG FLD-ACNC: 13.7 INDEX
RUBV IGG SER-IMP: POSITIVE
RUBV IGG SER-IMP: POSITIVE
SARS-COV-2 AB SERPL IA-ACNC: >250 U/ML
SODIUM SERPL-SCNC: 134 MMOL/L
SPECIFIC GRAVITY URINE: >=1.03
STRONGYLOIDES AB SER IA-ACNC: NEGATIVE
T GONDII AB SER-IMP: POSITIVE
T GONDII IGG SER QL: >400 IU/ML
T PALLIDUM AB SER QL IA: NEGATIVE
T3 SERPL-MCNC: 57 NG/DL
T4 FREE SERPL-MCNC: 1.4 NG/DL
TRIGL SERPL-MCNC: 351 MG/DL
TSH SERPL-ACNC: 2.62 UIU/ML
URATE SERPL-MCNC: 4.1 MG/DL
UROBILINOGEN URINE: NORMAL
VZV AB TITR SER: POSITIVE
VZV AB TITR SER: POSITIVE
VZV IGG SER IF-ACNC: 9.78 S/CO
VZV IGG SER IF-ACNC: 9.78 S/CO
WBC # FLD AUTO: 7.15 K/UL

## 2025-06-02 LAB — T CRUZI AB SER-ACNC: NONREACTIVE

## 2025-06-18 ENCOUNTER — APPOINTMENT (OUTPATIENT)
Dept: VASCULAR SURGERY | Facility: CLINIC | Age: 60
End: 2025-06-18
Payer: MEDICARE

## 2025-06-18 VITALS
TEMPERATURE: 98.2 F | WEIGHT: 124 LBS | HEART RATE: 71 BPM | DIASTOLIC BLOOD PRESSURE: 71 MMHG | BODY MASS INDEX: 22.82 KG/M2 | HEIGHT: 62 IN | SYSTOLIC BLOOD PRESSURE: 198 MMHG

## 2025-06-18 PROCEDURE — 99212 OFFICE O/P EST SF 10 MIN: CPT

## 2025-06-24 ENCOUNTER — APPOINTMENT (OUTPATIENT)
Dept: CARDIOLOGY | Facility: CLINIC | Age: 60
End: 2025-06-24
Payer: MEDICARE

## 2025-06-24 PROCEDURE — 93306 TTE W/DOPPLER COMPLETE: CPT

## 2025-06-24 PROCEDURE — A9500: CPT

## 2025-06-24 PROCEDURE — 93015 CV STRESS TEST SUPVJ I&R: CPT

## 2025-06-24 PROCEDURE — 78452 HT MUSCLE IMAGE SPECT MULT: CPT

## 2025-06-24 NOTE — PHYSICAL THERAPY INITIAL EVALUATION ADULT - LEVEL OF INDEPENDENCE: SIT/STAND, REHAB EVAL
Left message for patient to call [phone] to schedule a new patient appointment with labs from referral.    Thank you    
contact guard

## 2025-08-21 ENCOUNTER — APPOINTMENT (OUTPATIENT)
Dept: INFECTIOUS DISEASE | Facility: CLINIC | Age: 60
End: 2025-08-21
Payer: COMMERCIAL

## 2025-08-21 VITALS
SYSTOLIC BLOOD PRESSURE: 186 MMHG | DIASTOLIC BLOOD PRESSURE: 48 MMHG | TEMPERATURE: 97.1 F | BODY MASS INDEX: 23.41 KG/M2 | HEART RATE: 83 BPM | WEIGHT: 128 LBS | OXYGEN SATURATION: 98 %

## 2025-08-21 DIAGNOSIS — R59.0 LOCALIZED ENLARGED LYMPH NODES: ICD-10-CM

## 2025-08-21 PROCEDURE — 99215 OFFICE O/P EST HI 40 MIN: CPT

## 2025-09-17 ENCOUNTER — APPOINTMENT (OUTPATIENT)
Dept: VASCULAR SURGERY | Facility: CLINIC | Age: 60
End: 2025-09-17
Payer: MEDICARE

## 2025-09-17 VITALS
SYSTOLIC BLOOD PRESSURE: 187 MMHG | HEART RATE: 74 BPM | WEIGHT: 125 LBS | BODY MASS INDEX: 24.54 KG/M2 | HEIGHT: 60 IN | TEMPERATURE: 97.7 F | DIASTOLIC BLOOD PRESSURE: 75 MMHG

## 2025-09-17 PROCEDURE — 99212 OFFICE O/P EST SF 10 MIN: CPT

## (undated) DEVICE — SUT MONOCRYL 4-0 27" PS-2 UNDYED

## (undated) DEVICE — DRAPE TOWEL BLUE 17" X 24"

## (undated) DEVICE — SUT SILK 3-0 18" TIES

## (undated) DEVICE — SUT SILK 2-0 24" TIES

## (undated) DEVICE — NDL HYPO SAFE 25G X 5/8" (ORANGE)

## (undated) DEVICE — DRAPE 3/4 SHEET 52X76"

## (undated) DEVICE — DRAPE HAND 77" X 146"

## (undated) DEVICE — SUT PROLENE 7-0 24" BV-1

## (undated) DEVICE — WARMING BLANKET LOWER ADULT

## (undated) DEVICE — SOL IRR BAG NS 0.9% 1000ML

## (undated) DEVICE — BAG DECANTER DISP

## (undated) DEVICE — PACK AV FISTULA

## (undated) DEVICE — SYR LUER LOK 10CC

## (undated) DEVICE — SUT SILK 4-0 17-18"

## (undated) DEVICE — DURABLE MEDICAL EQUIPMENT: Type: DURABLE MEDICAL EQUIPMENT

## (undated) DEVICE — ELCTR GROUNDING PAD ADULT COVIDIEN

## (undated) DEVICE — DRSG STERISTRIPS 0.5 X 4"

## (undated) DEVICE — VENODYNE/SCD SLEEVE CALF MEDIUM

## (undated) DEVICE — POSITIONER STRAP ARMBOARD VELCRO TS-30

## (undated) DEVICE — DRSG TEGADERM 2.5X3"

## (undated) DEVICE — SYNOVIS VASCULAR PROBE 1.5MM 15CM

## (undated) DEVICE — SUT VICRYL 3-0 27" SH UNDYED

## (undated) DEVICE — SUT SILK 2-0 18" TIES

## (undated) DEVICE — DRSG TEGADERM 2.5 X 3"

## (undated) DEVICE — VESSEL LOOP MINI-BLUE 0.075" X 16"

## (undated) DEVICE — CLAMP BULLDOG MIDI 45 DEGREE (GREEN) DISP

## (undated) DEVICE — DRSG CURITY GAUZE SPONGE 4 X 4" 12-PLY

## (undated) DEVICE — DRSG BENZOIN 0.6CC

## (undated) DEVICE — PREP CHLORAPREP HI-LITE ORANGE 26ML

## (undated) DEVICE — GEL AQUSNC PACKET 20GR

## (undated) DEVICE — DRSG KERLIX ROLL LG 4.5"

## (undated) DEVICE — SUT PROLENE 6-0 24" BV-1

## (undated) DEVICE — SOL BAG NS 0.9% 1000ML